# Patient Record
Sex: FEMALE | Race: BLACK OR AFRICAN AMERICAN | NOT HISPANIC OR LATINO | Employment: OTHER | URBAN - METROPOLITAN AREA
[De-identification: names, ages, dates, MRNs, and addresses within clinical notes are randomized per-mention and may not be internally consistent; named-entity substitution may affect disease eponyms.]

---

## 2017-01-03 ENCOUNTER — LAB VISIT (OUTPATIENT)
Dept: LAB | Facility: HOSPITAL | Age: 52
End: 2017-01-03
Attending: INTERNAL MEDICINE
Payer: COMMERCIAL

## 2017-01-03 ENCOUNTER — TELEPHONE (OUTPATIENT)
Dept: VASCULAR SURGERY | Facility: CLINIC | Age: 52
End: 2017-01-03

## 2017-01-03 DIAGNOSIS — N18.6 ESRD (END STAGE RENAL DISEASE): ICD-10-CM

## 2017-01-03 LAB
ALBUMIN SERPL BCP-MCNC: 3 G/DL
ANION GAP SERPL CALC-SCNC: 8 MMOL/L
ANISOCYTOSIS BLD QL SMEAR: SLIGHT
BASOPHILS # BLD AUTO: 0.04 K/UL
BASOPHILS NFR BLD: 0.8 %
BUN SERPL-MCNC: 66 MG/DL
CALCIUM SERPL-MCNC: 8.9 MG/DL
CHLORIDE SERPL-SCNC: 114 MMOL/L
CO2 SERPL-SCNC: 16 MMOL/L
CREAT SERPL-MCNC: 6.9 MG/DL
DIFFERENTIAL METHOD: ABNORMAL
EOSINOPHIL # BLD AUTO: 0.4 K/UL
EOSINOPHIL NFR BLD: 7.9 %
ERYTHROCYTE [DISTWIDTH] IN BLOOD BY AUTOMATED COUNT: 15.4 %
EST. GFR  (AFRICAN AMERICAN): 7.2 ML/MIN/1.73 M^2
EST. GFR  (NON AFRICAN AMERICAN): 6.3 ML/MIN/1.73 M^2
FERRITIN SERPL-MCNC: 1831 NG/ML
GLUCOSE SERPL-MCNC: 90 MG/DL
HCT VFR BLD AUTO: 32.6 %
HGB BLD-MCNC: 10.9 G/DL
IRON SERPL-MCNC: 141 UG/DL
LYMPHOCYTES # BLD AUTO: 2.8 K/UL
LYMPHOCYTES NFR BLD: 52 %
MCH RBC QN AUTO: 27.8 PG
MCHC RBC AUTO-ENTMCNC: 33.4 %
MCV RBC AUTO: 83 FL
MONOCYTES # BLD AUTO: 0.3 K/UL
MONOCYTES NFR BLD: 4.7 %
NEUTROPHILS # BLD AUTO: 1.8 K/UL
NEUTROPHILS NFR BLD: 34.6 %
PHOSPHATE SERPL-MCNC: 3.6 MG/DL
PLATELET # BLD AUTO: 182 K/UL
PLATELET BLD QL SMEAR: ABNORMAL
PMV BLD AUTO: 9 FL
POTASSIUM SERPL-SCNC: 5.6 MMOL/L
RBC # BLD AUTO: 3.92 M/UL
SATURATED IRON: 73 %
SODIUM SERPL-SCNC: 138 MMOL/L
TOTAL IRON BINDING CAPACITY: 192 UG/DL
TRANSFERRIN SERPL-MCNC: 130 MG/DL
WBC # BLD AUTO: 5.29 K/UL

## 2017-01-03 PROCEDURE — 80069 RENAL FUNCTION PANEL: CPT

## 2017-01-03 PROCEDURE — 85025 COMPLETE CBC W/AUTO DIFF WBC: CPT

## 2017-01-03 PROCEDURE — 83540 ASSAY OF IRON: CPT

## 2017-01-03 PROCEDURE — 36415 COLL VENOUS BLD VENIPUNCTURE: CPT | Mod: PO

## 2017-01-03 PROCEDURE — 82728 ASSAY OF FERRITIN: CPT

## 2017-01-03 NOTE — TELEPHONE ENCOUNTER
----- Message from Gonzalez Boone sent at 1/3/2017  1:42 PM CST -----  Contact: Samreen with [facility]  Caller said pt needs appt for port placement. Please call Samreen to schedule at  [phone]

## 2017-01-05 ENCOUNTER — OFFICE VISIT (OUTPATIENT)
Dept: SURGERY | Facility: CLINIC | Age: 52
End: 2017-01-05
Payer: COMMERCIAL

## 2017-01-05 VITALS
SYSTOLIC BLOOD PRESSURE: 179 MMHG | DIASTOLIC BLOOD PRESSURE: 103 MMHG | HEART RATE: 82 BPM | BODY MASS INDEX: 25.16 KG/M2 | HEIGHT: 63 IN | WEIGHT: 142 LBS

## 2017-01-05 DIAGNOSIS — Z85.528 HISTORY OF KIDNEY CANCER: ICD-10-CM

## 2017-01-05 DIAGNOSIS — N18.6 ESRD ON PERITONEAL DIALYSIS: Primary | ICD-10-CM

## 2017-01-05 DIAGNOSIS — Z99.2 ESRD ON PERITONEAL DIALYSIS: Primary | ICD-10-CM

## 2017-01-05 PROCEDURE — 1159F MED LIST DOCD IN RCRD: CPT | Mod: S$GLB,,, | Performed by: SURGERY

## 2017-01-05 PROCEDURE — 99999 PR PBB SHADOW E&M-EST. PATIENT-LVL III: CPT | Mod: PBBFAC,,, | Performed by: SURGERY

## 2017-01-05 PROCEDURE — 99213 OFFICE O/P EST LOW 20 MIN: CPT | Mod: S$GLB,,, | Performed by: SURGERY

## 2017-01-05 NOTE — LETTER
[facility] - General Surgery  [address]  [place] [address]  Phone: [phone] January 5, 2017      Williams Hart MD  [address]  [place] [address]    Patient: Eva Bloom   MR Number: [id number]   YOB: 1965   Date of Visit: 1/5/2017     Dear Dr. Hart:    Thank you for referring Eva Bloom to me for evaluation. Below are the relevant portions of my assessment and plan of care.    Patient presents ESRD with abdominal cancer.     PLAN:  Removal of PD and placement of left neck portacath.    If you have questions, please do not hesitate to call me. I look forward to following Eva along with you.    Sincerely,      Vaughn Baron MD   Section Head - General, Laparoscopic, Bariatric  Acute Care and Oncologic Surgery   - Surgical Weight Loss Program  [facility]    WSR/anamariak    CC  MD Miguel Lockett Jr., MD

## 2017-01-05 NOTE — Clinical Note
January 5, 2017      Williams Hart MD  [address]  [place] [address]           [facility] General Surgery  [address]  [place] [address]  Phone: [phone]          Patient: Eva Bloom   MR Number: [id number]   YOB: 1965   Date of Visit: 1/5/2017       Dear Dr. Williams Hart:    Thank you for referring Eva Bloom to me for evaluation. Attached you will find relevant portions of my assessment and plan of care.    If you have questions, please do not hesitate to call me. I look forward to following Eva Bloom along with you.    Sincerely,    Vaughn Baron MD    Enclosure  CC:  No Recipients    If you would like to receive this communication electronically, please contact [email or web address] or [phone] to request more information on [employer] Link access.    For providers and/or their staff who would like to refer a patient to [facility], please contact us through our one-stop-shop provider referral line, [facility], at [phone].    If you feel you have received this communication in error or would no longer like to receive these types of communications, please e-mail [email or web address]

## 2017-01-05 NOTE — PROGRESS NOTES
History & Physical    SUBJECTIVE:     History of Present Illness:  Patient is a 51 y.o. female presents with esrd on pd and abdominal cancer.  She is s/p pd catheter last year.  She has had quiinton through a right neck port in the past.  She now has recurrent abdominal cancer.       Chief Complaint   Patient presents with    Consult       Review of patient's allergies indicates:   Allergen Reactions    Allopurinol      Other reaction(s): abnormal transaminases       Current Outpatient Prescriptions   Medication Sig Dispense Refill    acetaminophen (TYLENOL) 500 MG tablet Take 500 mg by mouth every 6 (six) hours as needed for Pain.      aluminum-magnesium hydroxide-simethicone (MAALOX) 200-200-20 mg/5 mL Susp Take 30 mLs by mouth every 6 (six) hours as needed.       cabozantinib 60 mg Tab Take 60 mg by mouth once daily. 30 tablet 5    calcitRIOL (ROCALTROL) 0.25 MCG Cap TAKE ONE CAPSULE BY MOUTH ONCE DAILY 30 capsule 11    febuxostat (ULORIC) 40 mg Tab Take 1 tablet (40 mg total) by mouth once daily. 30 tablet 5    lisinopril (PRINIVIL,ZESTRIL) 40 MG tablet TAKE ONE TABLET BY MOUTH ONCE DAILY 30 tablet 11    multivitamin-Ca-iron-minerals (ONE-A-DAY WOMENS FORMULA) 27-0.4 mg Tab Take by mouth. 1 Tablet Oral Every day      nifedipine (PROCARDIA-XL) 60 MG (OSM) 24 hr tablet Take 1 tablet (60 mg total) by mouth once daily. 30 tablet 11    ondansetron (ZOFRAN-ODT) 4 MG TbDL Take 1 tablet (4 mg total) by mouth every 8 (eight) hours as needed. 12 tablet 0    polyethylene glycol (GLYCOLAX) 17 gram PwPk Take 17 g by mouth 2 (two) times daily. (Patient taking differently: Take 17 g by mouth once daily. )  0    psyllium husk-calcium (METAMUCIL PLUS CALCIUM) 1-60 gram-mg Cap Take by mouth. 2 Capsule Oral Every morning       No current facility-administered medications for this visit.        Past Medical History   Diagnosis Date    Anemia     CMV (cytomegalovirus) antibody positive     Essential hypertension  "9/23/2015    H/O herpes simplex type 2 infection     Herpes simplex type 1 antibody positive     History of kidney cancer      s/p left nephrectomy 1/2016    Hyperparathyroidism, unspecified     Hyperuricemia without signs of inflammatory arthritis and tophaceous disease     Kidney stones     LGSIL (low grade squamous intraepithelial dysplasia)     Prediabetes     Proteinuria     Thyroid nodule     Urate nephropathy      Past Surgical History   Procedure Laterality Date    Tonsillectomy      Breast surgery       LUMPECTOMY    Tubal ligation      Colonoscopy N/A 11/12/2015    Nephrectomy-laparoscopic Left 01/12/2016    Peritoneal catheter insertion      Salpingoophorectomy Right 2016     KJB---DAVINCI     Family History   Problem Relation Age of Onset    Hypertension Mother     Diabetes Father     Kidney disease Father     Diabetes Maternal Grandfather     No Known Problems Sister     No Known Problems Brother     Heart disease Sister     No Known Problems Sister     No Known Problems Brother     Breast cancer Neg Hx     Colon cancer Neg Hx     Cancer Neg Hx     Stroke Neg Hx      Social History   Substance Use Topics    Smoking status: Never Smoker    Smokeless tobacco: Never Used    Alcohol use No      Comment: . 2 children. works at [employer].        Review of Systems:  Review of Systems   Constitutional: Negative for fever.   Respiratory: Negative for chest tightness and shortness of breath.    Cardiovascular: Negative for chest pain.   Gastrointestinal: Positive for abdominal pain.   Genitourinary: Positive for difficulty urinating.   Hematological:        Has easy bruising but no easy bleeding       OBJECTIVE:     Vital Signs (Most Recent)  Pulse: 82 (01/05/17 1338)  BP: (!) 179/103 (01/05/17 1338)  5' 3" (1.6 m)  64.4 kg (142 lb)     Physical Exam:  Physical Exam   Constitutional: She appears well-developed and well-nourished.   Cardiovascular: Normal rate, regular rhythm " and normal heart sounds.    Pulmonary/Chest: Effort normal and breath sounds normal.   Abdominal: Soft. Bowel sounds are normal.       Vitals reviewed.      Laboratory  CBC: Reviewed  BMP: Reviewed    Diagnostic Results:  CT: Reviewed    ASSESSMENT/PLAN:     ESRD with abdominal cancer.    PLAN:Plan     Removal of pd and fistula by vascular.

## 2017-01-06 ENCOUNTER — HOSPITAL ENCOUNTER (OUTPATIENT)
Dept: VASCULAR SURGERY | Facility: CLINIC | Age: 52
Discharge: HOME | End: 2017-01-06
Payer: COMMERCIAL

## 2017-01-06 ENCOUNTER — INITIAL CONSULT (OUTPATIENT)
Dept: VASCULAR SURGERY | Facility: CLINIC | Age: 52
End: 2017-01-06
Payer: COMMERCIAL

## 2017-01-06 VITALS
HEIGHT: 64 IN | HEART RATE: 87 BPM | BODY MASS INDEX: 24.07 KG/M2 | SYSTOLIC BLOOD PRESSURE: 167 MMHG | TEMPERATURE: 98 F | DIASTOLIC BLOOD PRESSURE: 97 MMHG | WEIGHT: 141 LBS

## 2017-01-06 DIAGNOSIS — Z99.2 ESRD ON PERITONEAL DIALYSIS: ICD-10-CM

## 2017-01-06 DIAGNOSIS — Z99.2 ESRD ON PERITONEAL DIALYSIS: Primary | ICD-10-CM

## 2017-01-06 DIAGNOSIS — Z01.818 OTHER SPECIFIED PRE-OPERATIVE EXAMINATION: ICD-10-CM

## 2017-01-06 DIAGNOSIS — N18.6 END-STAGE RENAL DISEASE ON PERITONEAL DIALYSIS: Primary | ICD-10-CM

## 2017-01-06 DIAGNOSIS — Z99.2 END-STAGE RENAL DISEASE ON PERITONEAL DIALYSIS: Primary | ICD-10-CM

## 2017-01-06 DIAGNOSIS — N18.6 ESRD ON PERITONEAL DIALYSIS: Primary | ICD-10-CM

## 2017-01-06 DIAGNOSIS — N18.6 ESRD ON PERITONEAL DIALYSIS: ICD-10-CM

## 2017-01-06 PROCEDURE — 99204 OFFICE O/P NEW MOD 45 MIN: CPT | Mod: S$GLB,,, | Performed by: SURGERY

## 2017-01-06 PROCEDURE — 99999 PR PBB SHADOW E&M-EST. PATIENT-LVL III: CPT | Mod: PBBFAC,,, | Performed by: SURGERY

## 2017-01-06 PROCEDURE — 1159F MED LIST DOCD IN RCRD: CPT | Mod: S$GLB,,, | Performed by: SURGERY

## 2017-01-06 PROCEDURE — G0365 VESSEL MAPPING HEMO ACCESS: HCPCS | Mod: S$GLB,,, | Performed by: SURGERY

## 2017-01-06 NOTE — PROGRESS NOTES
REFERRING PHYSICIAN:  Williams Hart M.D.    HISTORY OF PRESENT ILLNESS:  A 51-year-old female with end-stage renal disease   who has been undergoing peritoneal dialysis since February, who now is not using   any peritoneal dialysis or hemodialysis, who is referred for AV access   placement.  She is right-handed.  She has no history of central venous   cannulation other than a right IJ PermCath when she initiated dialysis in early   2016.    PAST MEDICAL HISTORY:  1.  Hypertension.  2.  Hyperparathyroidism.  3.  Prediabetes.    PAST SURGICAL HISTORY:  1.  Tonsillectomy.  2.  Breast lumpectomy.  3.  Laparoscopic nephrectomy.  4.  PD placement.  5.  Oophorectomy.    FAMILY HISTORY:  Positive for diabetes and hypertension.    SOCIAL HISTORY:  Nonsmoker.    MEDICATIONS:  No anticoagulants or antiplatelet agents.  See EPIC for full list.    ALLERGIES:  Allopurinol.    REVIEW OF SYSTEMS:  CARDIOVASCULAR:  Denies postprandial pain or DVT.  All other systems including eyes, ENT, , musculoskeletal, breast, psychiatric,   lymph, allergy and immune are negative.    PHYSICAL EXAMINATION:  VITAL SIGNS:  See nursing note.  GENERAL:  She is in no acute distress.  RESPIRATORY:  Normal effort.  Clear to auscultation.  CARDIOVASCULAR:  Regular rate and rhythm, nondisplaced PMI, no murmur.  VASCULAR:  2+ radial, brachial and femoral pulses.  EXTREMITIES:  Arms do not show ballottable veins.  NEUROLOGIC:  Cranial nerves II-XII intact, 5/5 muscle strength in all   extremities.    IMAGING:  Vein mapping reveals very small cephalic and basilic veins.  The   largest of these is a left cephalic which is 2.3 at the antecubital fossa, but   only 1.8 in the proximal arm.    ASSESSMENT:  Poor candidate for AV fistula due to diminutive vein size.  It is   possible that the left cephalic vein may look better after a block is placed,   but likely she will need an AV graft.    PLAN:  1.  Left upper arm loop AV graft placement, 01/12/2017.  2.   Regional block.    We will interrogate the left cephalic vein with ultrasound immediately prior to   beginning of this procedure, and if the left cephalic vein appeared better than   this preoperative vein mapping then we would proceed with brachiocephalic   fistula instead.    The patient understands the risks and rationale of the procedure and wishes to   Proceed.    ADDENDUM: Dr Baron wishes to remove her PD catheter with same anesthetic.    BEAR  dd: 01/06/2017 12:11:18 (CST)  td: 01/06/2017 12:52:13 (CST)  Doc ID   #[id number]  Job ID #[id number]    CC:

## 2017-01-06 NOTE — MR AVS SNAPSHOT
[facility] Vascular Surgery  [address]  [place] [address]  Phone: [phone]  Fax: [phone]                  Eva Bloom   2017 11:30 AM   Initial consult    Description:  Female : 1965   Provider:  FARIDEH Muñoz III, MD   Department:  [facility] - Vascular Surgery           Reason for Visit     Consult           Diagnoses this Visit        Comments    ESRD on peritoneal dialysis    -  Primary            To Do List           Future Appointments        Provider Department Dept Phone    2017 7:45 AM 3PREP, [address] [facility] [phone]    2017 9:30 AM LAB, HEMONC CANCER BLDG [facility] [phone]    2017 10:30 AM TED [place] - Hematology Oncology [phone]    2017 3:40 PM Williams Hart MD [place]  Nephrology [phone]    2017 4:00 PM Williams Hart MD [facility] [place] [facility]  Nephrology [phone]      Your Future Surgeries/Procedures     2017   Surgery with Figueroa Dsouza MD   [facility] ([facility])    [address] [facility] [id number] [phone]              Goals (5 Years of Data)              9/8/16    10/9/15    HEMOGLOBIN A1C < 7.0   5.1  6.0    Related Problems    Prediabetes      [facility]     [facility] Nurse Care Line -  Assistance  Registered nurses in the [facility] provide clinical advisement, health education, appointment booking, and other advisory services.  Call for this free service at [phone].             Medications           Message regarding Medications     Verify the changes and/or additions to your medication regime listed below are the same as discussed with your clinician today.  If any of these changes or additions are incorrect, please notify your healthcare provider.             Verify that the below list of medications is an accurate representation of the medications  "you are currently taking.  If none reported, the list may be blank. If incorrect, please contact your healthcare provider. Carry this list with you in case of emergency.           Current Medications     acetaminophen (TYLENOL) 500 MG tablet Take 500 mg by mouth every 6 (six) hours as needed for Pain.    aluminum-magnesium hydroxide-simethicone (MAALOX) 200-200-20 mg/5 mL Susp Take 30 mLs by mouth every 6 (six) hours as needed.     cabozantinib 60 mg Tab Take 60 mg by mouth once daily.    calcitRIOL (ROCALTROL) 0.25 MCG Cap TAKE ONE CAPSULE BY MOUTH ONCE DAILY    febuxostat (ULORIC) 40 mg Tab Take 1 tablet (40 mg total) by mouth once daily.    lisinopril (PRINIVIL,ZESTRIL) 40 MG tablet TAKE ONE TABLET BY MOUTH ONCE DAILY    multivitamin-Ca-iron-minerals (ONE-A-DAY WOMENS FORMULA) 27-0.4 mg Tab Take by mouth. 1 Tablet Oral Every day    nifedipine (PROCARDIA-XL) 60 MG (OSM) 24 hr tablet Take 1 tablet (60 mg total) by mouth once daily.    ondansetron (ZOFRAN-ODT) 4 MG TbDL Take 1 tablet (4 mg total) by mouth every 8 (eight) hours as needed.    polyethylene glycol (GLYCOLAX) 17 gram PwPk Take 17 g by mouth 2 (two) times daily.    psyllium husk-calcium (METAMUCIL PLUS CALCIUM) 1-60 gram-mg Cap Take by mouth. 2 Capsule Oral Every morning           Clinical Reference Information           Vital Signs - Last Recorded  Most recent update: 1/6/2017 11:51 AM by Morelia Esteban MA    BP Pulse Temp Ht Wt BMI    (!) 167/97 (BP Location: Right arm, Patient Position: Sitting, BP Method: Automatic) 87 98 °F (36.7 °C) (Oral) 5' 4" (1.626 m) 64 kg (141 lb) 24.2 kg/m2      Blood Pressure          Most Recent Value    Right Arm BP - Sitting  167/97    Left Arm BP - Sitting  179/101    BP  (!)  167/97      Allergies as of 1/6/2017     Allopurinol      Immunizations Administered on Date of Encounter - 1/6/2017     None      Maintenance Dialysis History     Start End Type Comments Center    1/15/2016  Peritoneal  [facility] [place]         "    Current Dialysis Center Information     [facility] [address] [place] Phone #:  [phone]    Contact:  N/A LOUISA [place]ARTE  [id number] Fax #:  [phone]            Transplant Information        Txp Date Organ Coordinator Care Team     Kidney Kinsey Carballo, RN Referring Physician:  Williams Hart MD   Current Nephrologist:  Williams Hart MD

## 2017-01-09 ENCOUNTER — HOSPITAL ENCOUNTER (OUTPATIENT)
Facility: HOSPITAL | Age: 52
Discharge: HOME | End: 2017-01-09
Attending: INTERNAL MEDICINE | Admitting: INTERNAL MEDICINE
Payer: COMMERCIAL

## 2017-01-09 VITALS
WEIGHT: 139 LBS | HEIGHT: 64 IN | TEMPERATURE: 98 F | HEART RATE: 96 BPM | DIASTOLIC BLOOD PRESSURE: 78 MMHG | BODY MASS INDEX: 23.73 KG/M2 | OXYGEN SATURATION: 100 % | RESPIRATION RATE: 18 BRPM | SYSTOLIC BLOOD PRESSURE: 136 MMHG

## 2017-01-09 DIAGNOSIS — N18.5 STAGE 5 CHRONIC KIDNEY DISEASE: ICD-10-CM

## 2017-01-09 DIAGNOSIS — Z99.2 ESRD ON PERITONEAL DIALYSIS: Primary | ICD-10-CM

## 2017-01-09 DIAGNOSIS — N18.6 ESRD ON PERITONEAL DIALYSIS: Primary | ICD-10-CM

## 2017-01-09 PROCEDURE — 76937 US GUIDE VASCULAR ACCESS: CPT | Mod: 26,,, | Performed by: INTERNAL MEDICINE

## 2017-01-09 PROCEDURE — 63600175 PHARM REV CODE 636 W HCPCS

## 2017-01-09 PROCEDURE — 77001 FLUOROGUIDE FOR VEIN DEVICE: CPT | Mod: 26,,, | Performed by: INTERNAL MEDICINE

## 2017-01-09 PROCEDURE — 36558 INSERT TUNNELED CV CATH: CPT | Mod: ,,, | Performed by: INTERNAL MEDICINE

## 2017-01-09 PROCEDURE — 77001 FLUOROGUIDE FOR VEIN DEVICE: CPT

## 2017-01-09 PROCEDURE — 25000003 PHARM REV CODE 250

## 2017-01-09 NOTE — DISCHARGE SUMMARY
[facility]  Discharge Note  Short Stay    Admit Date: 1/9/2017    Discharge Date and Time: 1/9/17    Attending Physician: Figueroa Dsouza MD     Discharge Provider: Flavio Zhao    Diagnoses:  Active Hospital Problems    Diagnosis  POA    ESRD on peritoneal dialysis [N18.6, Z99.2]  Not Applicable     uric acid nephropathy  - followed by nephrology, Dr. Hart  - followed by [facility]        Resolved Hospital Problems    Diagnosis Date Resolved POA   No resolved problems to display.       Discharged Condition: stable    Hospital Course: Patient was admitted for an outpatient procedure and tolerated the procedure well with no complications.    Final Diagnoses: Same as principal problem.    Disposition: Home or Self Care    Follow up/Patient Instructions:  Instruction given. F/u with Dr. Hart.     Medications:  Reconciled Home Medications:   Current Discharge Medication List      CONTINUE these medications which have NOT CHANGED    Details   acetaminophen (TYLENOL) 500 MG tablet Take 500 mg by mouth every 6 (six) hours as needed for Pain.      aluminum-magnesium hydroxide-simethicone (MAALOX) 200-200-20 mg/5 mL Susp Take 30 mLs by mouth every 6 (six) hours as needed.       cabozantinib 60 mg Tab Take 60 mg by mouth once daily.  Qty: 30 tablet, Refills: 5    Associated Diagnoses: Renal cell carcinoma, unspecified laterality      calcitRIOL (ROCALTROL) 0.25 MCG Cap TAKE ONE CAPSULE BY MOUTH ONCE DAILY  Qty: 30 capsule, Refills: 11    Associated Diagnoses: Hyperparathyroidism due to renal insufficiency      febuxostat (ULORIC) 40 mg Tab Take 1 tablet (40 mg total) by mouth once daily.  Qty: 30 tablet, Refills: 5      lisinopril (PRINIVIL,ZESTRIL) 40 MG tablet TAKE ONE TABLET BY MOUTH ONCE DAILY  Qty: 30 tablet, Refills: 11    Associated Diagnoses: Hypertensive kidney disease with chronic kidney disease, stage 1-4 or unspecified chronic kidney disease      multivitamin-Ca-iron-minerals (ONE-A-DAY  WOMENS FORMULA) 27-0.4 mg Tab Take by mouth. 1 Tablet Oral Every day      nifedipine (PROCARDIA-XL) 60 MG (OSM) 24 hr tablet Take 1 tablet (60 mg total) by mouth once daily.  Qty: 30 tablet, Refills: 11      ondansetron (ZOFRAN-ODT) 4 MG TbDL Take 1 tablet (4 mg total) by mouth every 8 (eight) hours as needed.  Qty: 12 tablet, Refills: 0      polyethylene glycol (GLYCOLAX) 17 gram PwPk Take 17 g by mouth 2 (two) times daily.  Refills: 0      psyllium husk-calcium (METAMUCIL PLUS CALCIUM) 1-60 gram-mg Cap Take by mouth. 2 Capsule Oral Every morning           No discharge procedures on file.      Discharge Procedure Orders: Resume previous diet and activity.

## 2017-01-09 NOTE — IP AVS SNAPSHOT
[facility]  [address]  [facility] [address]  Phone: [phone]           Patient Discharge Instructions     Our goal is to set you up for success. This packet includes information on your condition, medications, and your home care. It will help you to care for yourself so you don't get sicker and need to go back to the hospital.     Please ask your nurse if you have any questions.        There are many details to remember when preparing to leave the hospital. Here is what you will need to do:    1. Take your medicine. If you are prescribed medications, review your Medication List in the following pages. You may have new medications to  at the pharmacy and others that you'll need to stop taking. Review the instructions for how and when to take your medications. Talk with your doctor or nurses if you are unsure of what to do.     2. Go to your follow-up appointments. Specific follow-up information is listed in the following pages. Your may be contacted by a transition nurse or clinical provider about future appointments. Be sure we have all of the phone numbers to reach you, if needed. Please contact your provider's office if you are unable to make an appointment.     3. Watch for warning signs. Your doctor or nurse will give you detailed warning signs to watch for and when to call for assistance. These instructions may also include educational information about your condition. If you experience any of warning signs to your health, call your doctor.               [facility]  Unless otherwise directed by your provider, please contact [facility], our nurse care line that is available for 24/7 assistance.     [phone] (toll-free)    Registered nurses in the [facility] provide clinical advisement, health education, appointment booking, and other advisory services.                    ** Verify the list of medication(s) below is accurate and up  to date. Carry this with you in case of emergency. If your medications have changed, please notify your healthcare provider.             Medication List      ASK your doctor about these medications        Additional Info                      acetaminophen 500 MG tablet   Commonly known as:  TYLENOL   Refills:  0   Dose:  500 mg    Instructions:  Take 500 mg by mouth every 6 (six) hours as needed for Pain.     Begin Date    AM    Noon    PM    Bedtime       aluminum-magnesium hydroxide-simethicone 200-200-20 mg/5 mL Susp   Commonly known as:  MAALOX   Refills:  0   Dose:  30 mL   Indications:  prn    Instructions:  Take 30 mLs by mouth every 6 (six) hours as needed.     Begin Date    AM    Noon    PM    Bedtime       cabozantinib 60 mg Tab   Quantity:  30 tablet   Refills:  5   Dose:  60 mg    Instructions:  Take 60 mg by mouth once daily.     Begin Date    AM    Noon    PM    Bedtime       calcitRIOL 0.25 MCG Cap   Commonly known as:  ROCALTROL   Quantity:  30 capsule   Refills:  11    Instructions:  TAKE ONE CAPSULE BY MOUTH ONCE DAILY     Begin Date    AM    Noon    PM    Bedtime       febuxostat 40 mg Tab   Commonly known as:  ULORIC   Quantity:  30 tablet   Refills:  5   Dose:  40 mg    Instructions:  Take 1 tablet (40 mg total) by mouth once daily.     Begin Date    AM    Noon    PM    Bedtime       lisinopril 40 MG tablet   Commonly known as:  PRINIVIL,ZESTRIL   Quantity:  30 tablet   Refills:  11    Instructions:  TAKE ONE TABLET BY MOUTH ONCE DAILY     Begin Date    AM    Noon    PM    Bedtime       METAMUCIL PLUS CALCIUM 1-60 gram-mg Cap   Refills:  0   Generic drug:  psyllium husk-calcium    Instructions:  Take by mouth. 2 Capsule Oral Every morning     Begin Date    AM    Noon    PM    Bedtime       nifedipine 60 MG (OSM) 24 hr tablet   Commonly known as:  PROCARDIA-XL   Quantity:  30 tablet   Refills:  11   Dose:  60 mg    Instructions:  Take 1 tablet (60 mg total) by mouth once daily.     Begin Date     AM    Noon    PM    Bedtime       ondansetron 4 MG Tbdl   Commonly known as:  ZOFRAN-ODT   Quantity:  12 tablet   Refills:  0   Dose:  4 mg    Instructions:  Take 1 tablet (4 mg total) by mouth every 8 (eight) hours as needed.     Begin Date    AM    Noon    PM    Bedtime       ONE-A-DAY WOMENS FORMULA 27-0.4 mg Tab   Refills:  0   Generic drug:  multivitamin-Ca-iron-minerals    Instructions:  Take by mouth. 1 Tablet Oral Every day     Begin Date    AM    Noon    PM    Bedtime       polyethylene glycol 17 gram Pwpk   Commonly known as:  GLYCOLAX   Refills:  0   Dose:  17 g    Instructions:  Take 17 g by mouth 2 (two) times daily.     Begin Date    AM    Noon    PM    Bedtime                  Please bring to all follow up appointments:    1. A copy of your discharge instructions.  2. All medicines you are currently taking in their original bottles.  3. Identification and insurance card.    Please arrive 15 minutes ahead of scheduled appointment time.    Please call 24 hours in advance if you must reschedule your appointment and/or time.        Your Scheduled Appointments     Jan 11, 2017  9:30 AM CST   Non-Fasting Lab with LAB, HEMONC CANCER BLDG [facility] ([facility])    [address]  [place] [address]   [phone]            Jan 11, 2017 10:30 AM CST   Established Patient Visit with Liset Ngo NP   [place] [facility] ([facility])    [address]  [place] [address]   [phone]            Jan 18, 2017  3:40 PM CST   Established Patient Visit with Williams Hart MD   [facility] ([place])    [address] [facility] [place] [address]   [phone]            Feb 22, 2017  4:00 PM CST   Established Patient Visit with Williams Hart MD   [facility] ([place])    [address] [facility] [place] [address]   [phone]            Mar 29, 2017  4:00 PM CDT   Established Patient Visit with Williams Hart MD  "  [facility] ([place])    [address]  [place] [address] [phone]              Your Future Surgeries/Procedures     Jan 12, 2017   Surgery with FARIDEH Muñoz III, MD   [facility] ([facility])    [address] [facility] [address] [phone]                  Admission Information     Date & Time Provider Department CSN    1/9/2017  7:16 AM Figueroa Dsouza MD [facility] [id number]      Care Providers     Provider Role Specialty Primary office phone    Figueroa Dsouza MD Attending Provider Nephrology [phone]      Your Vitals Were     BP Pulse Temp Resp Height Weight    148/80 (BP Location: Right arm, Patient Position: Sitting, BP Method: Automatic) 94 97.7 °F (36.5 °C) (Oral) 18 5' 4" (1.626 m) 63 kg (139 lb)    SpO2 BMI             100% 23.86 kg/m2         Recent Lab Values        10/9/2015 9/8/2016                       11:25 AM  8:10 AM          A1C 6.0 5.1          Comment for A1C at  8:10 AM on 9/8/2016:  According to ADA guidelines, hemoglobin A1C <7.0% represents  optimal control in non-pregnant diabetic patients.  Different  metrics may apply to specific populations.   Standards of Medical Care in Diabetes - 2016.  For the purpose of screening for the presence of diabetes:  <5.7%     Consistent with the absence of diabetes  5.7-6.4%  Consistent with increasing risk for diabetes   (prediabetes)  >or=6.5%  Consistent with diabetes  Currently no consensus exists for use of hemoglobin A1C  for diagnosis of diabetes for children.        Allergies as of 1/9/2017        Reactions    Allopurinol     Other reaction(s): abnormal transaminases      Advance Directives     An advance directive is a document which, in the event you are no longer able to make decisions for yourself, tells your healthcare team what kind of treatment you do or do not want to receive, or who you would like to make those decisions for you.  " If you do not currently have an advance directive, [facility] encourages you to create one.  For more information call:  [phone]), [phone] ([phone]),  or log on to [email or web address].        Language Assistance Services     ATTENTION: Language assistance services are available, free of charge. Please call [phone].      ATENCIÓN: Si habla español, tiene a bonilla disposición servicios gratuitos de asistencia lingüística. Llame al [phone].     CHÚ Ý: N?u b?n nói Ti?ng Vi?t, có các d?ch v? h? tr? ngôn ng? mi?n phí dành cho b?n. G?i s? [phone].        Chronic Kindey Disease Education              [facility] complies with applicable Federal civil rights laws and does not discriminate on the basis of race, color, national origin, age, disability, or sex.

## 2017-01-09 NOTE — PLAN OF CARE
Problem: Patient Care Overview  Goal: Plan of Care Review  Outcome: Outcome(s) achieved Date Met:  01/09/17  Patient discharged per MD orders. Instructions given on medications, wound care, activity, signs of infection, when to call MD, and follow up appointments. Pt verbalized understanding. Patient AAOx3, VSS, no c/o pain or discomfort at this time. Central line dressing c/d/i to right chest wall. No active bleeding. No hematoma noted. Ports are clamped. Telemetry and PIV removed. Patient and family refused transport, ambulated off unit.

## 2017-01-09 NOTE — PROGRESS NOTES
[facility] Procedure Note:  A right IJ Bard, Glidepath 14.5 Fr. x 19cm tunneled catheter was inserted by Dr. Zhao.  Lot # [id number]. The catheter was inserted and verified under flouroscopy.  OK to use the catheter per Dr. Zhao. Heparin 1000 units/ml was instilled to fill each catheter lumen.  Arterial lumen = 1.6 ml; Venous lumen = 1.6 ml; Total Heparin = 3200 untis.

## 2017-01-09 NOTE — IP AVS SNAPSHOT
[address]  [place] [address]  Phone: [phone]           I have received a copy of my After Visit Summary and discharge instructions from [facility].    INSTRUCTIONS RECEIVED AND UNDERSTOOD BY:                     Patient/Patient Representative: ________________________________________________________________     Date/Time: ________________________________________________________________                     Instructions Given By: ________________________________________________________________     Date/Time: ________________________________________________________________

## 2017-01-09 NOTE — PROCEDURES
Procedure Date: 1/9/17    (s) and Role:   Flavio Zhao MD    Pre-op Diagnosis:    ESRD  PD failure    Post-op Diagnosis;  ESRD  PD failure    Procedure: Insertion Tunneled HD Catheter with Fluoro     Anesthesia: IV Sedation + Local     Findings/Key Components:   Catheter placed in RA. Good flush and draw through each port.  Estimated Blood Loss: 5mL     Specimens     None         PROCEDURE: After obtaining consent, the patient was taken to the [facility] suite. Sedation was administered. The neck and chest were prepped and draped in usual sterile fashion. We began using ultrasound guidance to examine the right internal jugular vein. It appeared to be patent and was free of thrombus that appeared suitable for access for HD catheter. We accessed the internal jugular vein using a Seldinger technique with an micropunture needle without difficulty, then the thin wire was passed into the superior vena cava through the heart into the inferior vena cava. The wire position was confirmed with intraoperative fluoroscopy, then a 5Fr sheath was introduced over the wire. The wire was removed and the the guidewire was passed into the IVC under fluoroscopic guidance. Counterincision was made at the venotomy and on the chest wall just below the clavicle. Local anesthesia was used to anesthetize the track and a tunneler was used to pass the 19cm GlidePath catheter underneath the chest wall until the felt cuff was just underneath the counter incision. The 5fr sheath was removed and the vein was dilated using serial dilators. Then the large peel-away sheath was then inserted over the guidewire under fluoroscopic guidance. The dilator and wires were removed. The catheter was placed through the peel-away sheath and positioned appropriately at center of RA. Fluoroscopy was used to confirm that the catheter tip was in appropriate position and that there was no kinking at the apex of the catheter. A permanent recording of the  catheter position was also taken with fluoroscopy. Both lumens had excellent draw and flush. The catheter was then secured in place with 3-0 Prolene. The counterincision in the neck was closed with a 3-0 Vicryl. There were no immediate complications. She was discharged in stable condition. Tolerated well.

## 2017-01-09 NOTE — PLAN OF CARE
Problem: Patient Care Overview  Goal: Plan of Care Review  Outcome: Ongoing (interventions implemented as appropriate)  Received report from FELIPE Momin. Patient s/p permacath placement, AAOx3. VSS, no c/o pain or discomfort at this time, resp even and unlabored.  Right chest wall permacath clamped withgauze/tegaderm dressing is c/d/i.   No active bleeding. No hematoma noted. Post procedure protocol reviewed with patient. Understanding verbalized.  Nurse call bell within reach. Will continue to monitor per post procedure protocol.

## 2017-01-09 NOTE — PLAN OF CARE
Problem: Patient Care Overview  Goal: Plan of Care Review  Outcome: Ongoing (interventions implemented as appropriate)  Patient arrived to room. PIV placed. Admit assessment completed. Plan of care discussed with patient. Will monitor

## 2017-01-09 NOTE — H&P
Nephrology H&P      Consult Requested By: Figueroa Dsouza MD  Reason for Consult: permacath placement     SUBJECTIVE:     History of Present Illness:  Patient is a 51 y.o. female presents for permacath placement. Patient is ESRD on PD, now with PD failure. Patient is doing well and has no complaints.     PTA Medications   Medication Sig    acetaminophen (TYLENOL) 500 MG tablet Take 500 mg by mouth every 6 (six) hours as needed for Pain.    aluminum-magnesium hydroxide-simethicone (MAALOX) 200-200-20 mg/5 mL Susp Take 30 mLs by mouth every 6 (six) hours as needed.     cabozantinib 60 mg Tab Take 60 mg by mouth once daily.    calcitRIOL (ROCALTROL) 0.25 MCG Cap TAKE ONE CAPSULE BY MOUTH ONCE DAILY    febuxostat (ULORIC) 40 mg Tab Take 1 tablet (40 mg total) by mouth once daily.    lisinopril (PRINIVIL,ZESTRIL) 40 MG tablet TAKE ONE TABLET BY MOUTH ONCE DAILY    multivitamin-Ca-iron-minerals (ONE-A-DAY WOMENS FORMULA) 27-0.4 mg Tab Take by mouth. 1 Tablet Oral Every day    nifedipine (PROCARDIA-XL) 60 MG (OSM) 24 hr tablet Take 1 tablet (60 mg total) by mouth once daily.    ondansetron (ZOFRAN-ODT) 4 MG TbDL Take 1 tablet (4 mg total) by mouth every 8 (eight) hours as needed.    polyethylene glycol (GLYCOLAX) 17 gram PwPk Take 17 g by mouth 2 (two) times daily. (Patient taking differently: Take 17 g by mouth once daily. )    psyllium husk-calcium (METAMUCIL PLUS CALCIUM) 1-60 gram-mg Cap Take by mouth. 2 Capsule Oral Every morning       Review of patient's allergies indicates:   Allergen Reactions    Allopurinol      Other reaction(s): abnormal transaminases        Past Medical History   Diagnosis Date    Anemia     Cancer     CMV (cytomegalovirus) antibody positive     Essential hypertension 9/23/2015    H/O herpes simplex type 2 infection     Herpes simplex type 1 antibody positive     History of kidney cancer      s/p left nephrectomy 1/2016    Hyperparathyroidism, unspecified      Hyperuricemia without signs of inflammatory arthritis and tophaceous disease     Kidney stones     LGSIL (low grade squamous intraepithelial dysplasia)     Prediabetes     Proteinuria     Thyroid nodule     Urate nephropathy      Past Surgical History   Procedure Laterality Date    Tonsillectomy      Breast surgery       LUMPECTOMY    Tubal ligation      Colonoscopy N/A 11/12/2015    Nephrectomy-laparoscopic Left 01/12/2016    Peritoneal catheter insertion      Salpingoophorectomy Right 2016     KJB---DAVINCI     Family History   Problem Relation Age of Onset    Hypertension Mother     Diabetes Father     Kidney disease Father     Diabetes Maternal Grandfather     No Known Problems Sister     No Known Problems Brother     Heart disease Sister     No Known Problems Sister     No Known Problems Brother     Breast cancer Neg Hx     Colon cancer Neg Hx     Cancer Neg Hx     Stroke Neg Hx      Social History   Substance Use Topics    Smoking status: Never Smoker    Smokeless tobacco: Never Used    Alcohol use No      Comment: . 2 children. works at [employer].       Review of Systems:  Constitutional: Negative for fatigue.   Eyes: Negative for discharge.   Respiratory: Negative for cough, shortness of breath and wheezing.   Cardiovascular: Negative for chest pain and palpitations.   Gastrointestinal: Negative for nausea, vomiting, abdominal pain and diarrhea.   Genitourinary: Negative for dysuria, urgency, frequency and hematuria.   Skin: Negative for color change and rash.   Psychiatric/Behavioral: Negative for confusion.      OBJECTIVE:     Vital Signs (Most Recent)  Temp: 97.5 °F (36.4 °C) (01/09/17 0739)  Pulse: 98 (01/09/17 0739)  Resp: 18 (01/09/17 0739)  BP: (!) 169/102 (01/09/17 0740)  SpO2: 100 % (01/09/17 0739)       No intake or output data in the 24 hours ending 01/09/17 0841    Physical Exam:  Gen: AAOx3, NAD  HEENT: mmm  Neck: no bruit, no JVD  CV: RRR, no m/r  Resp: CTAx2,  normal effort  GI: soft, ND, NTTP, +BS, PD cath in place/dressing  Extr: no LE edema  Neuro: normal reflexes, no focal deficits      Laboratory:  CBC with Diff:     Recent Labs  Lab 01/03/17 0912   WBC 5.29   HGB 10.9*   HCT 32.6*      LYMPH 52.0*  2.8   MONO 4.7  0.3   EOSINOPHIL 7.9     COAG:  No results for input(s): APTT, INR, PTT in the last 168 hours.    CMP:   Recent Labs  Lab 01/03/17 0912   GLU 90   CALCIUM 8.9   ALBUMIN 3.0*      K 5.6*   CO2 16*   *   BUN 66*   CREATININE 6.9*   PHOS 3.6         Lab Results   Component Value Date    LABPROT 9.7 09/23/2015     Lab Results   Component Value Date    CALCIUM 8.9 01/03/2017    PHOS 3.6 01/03/2017     Lab Results   Component Value Date    IRON 141 01/03/2017    TIBC 192 (L) 01/03/2017    FERRITIN 1831 (H) 01/03/2017         Diagnostic Results:  Labs: Reviewed      Scheduled Meds:  Continuous Infusions:          ASSESSMENT/PLAN:     Patient Active Problem List   Diagnosis    ESRD on peritoneal dialysis    Kidney stones    Hyperparathyroidism, secondary renal    Hyperuricemia without signs of inflammatory arthritis and tophaceous disease    Proteinuria    Urate nephropathy    Essential hypertension    Pap smear abnormality of cervix with ASCUS favoring benign    CMV (cytomegalovirus) antibody positive    Herpes simplex type 1 antibody positive    H/O herpes simplex type 2 infection    Overweight (BMI 25.0-29.9)    Prediabetes    Multinodular goiter    History of kidney cancer    Patient on waiting list for kidney transplant    S/p nephrectomy left    History of abnormal cervical Pap smear    Other specified pre-operative examination       Plan:     Will place tunneled HD catheter for hemodialysis initiation.

## 2017-01-11 ENCOUNTER — LAB VISIT (OUTPATIENT)
Dept: LAB | Facility: HOSPITAL | Age: 52
End: 2017-01-11
Attending: INTERNAL MEDICINE
Payer: COMMERCIAL

## 2017-01-11 ENCOUNTER — TELEPHONE (OUTPATIENT)
Dept: VASCULAR SURGERY | Facility: CLINIC | Age: 52
End: 2017-01-11

## 2017-01-11 ENCOUNTER — TELEPHONE (OUTPATIENT)
Dept: SURGERY | Facility: CLINIC | Age: 52
End: 2017-01-11

## 2017-01-11 ENCOUNTER — OFFICE VISIT (OUTPATIENT)
Dept: HEMATOLOGY/ONCOLOGY | Facility: CLINIC | Age: 52
End: 2017-01-11
Payer: COMMERCIAL

## 2017-01-11 VITALS
TEMPERATURE: 100 F | BODY MASS INDEX: 24.41 KG/M2 | HEART RATE: 83 BPM | SYSTOLIC BLOOD PRESSURE: 176 MMHG | WEIGHT: 142.19 LBS | DIASTOLIC BLOOD PRESSURE: 98 MMHG | RESPIRATION RATE: 16 BRPM

## 2017-01-11 DIAGNOSIS — T45.1X5A CHEMOTHERAPY-INDUCED THROMBOCYTOPENIA: ICD-10-CM

## 2017-01-11 DIAGNOSIS — C64.2 MALIGNANT NEOPLASM OF LEFT KIDNEY: ICD-10-CM

## 2017-01-11 DIAGNOSIS — Z51.11 ENCOUNTER FOR CHEMOTHERAPY MANAGEMENT: ICD-10-CM

## 2017-01-11 DIAGNOSIS — T45.1X5A CHEMOTHERAPY INDUCED NAUSEA AND VOMITING: ICD-10-CM

## 2017-01-11 DIAGNOSIS — N18.6 ANEMIA IN ESRD (END-STAGE RENAL DISEASE): ICD-10-CM

## 2017-01-11 DIAGNOSIS — D69.59 CHEMOTHERAPY-INDUCED THROMBOCYTOPENIA: ICD-10-CM

## 2017-01-11 DIAGNOSIS — C78.6 MALIGNANT NEOPLASM METASTATIC TO PERITONEUM: ICD-10-CM

## 2017-01-11 DIAGNOSIS — N18.6 ESRD ON HEMODIALYSIS: ICD-10-CM

## 2017-01-11 DIAGNOSIS — E04.1 THYROID NODULE: ICD-10-CM

## 2017-01-11 DIAGNOSIS — R11.2 CHEMOTHERAPY INDUCED NAUSEA AND VOMITING: ICD-10-CM

## 2017-01-11 DIAGNOSIS — Z90.721 HISTORY OF RIGHT OOPHORECTOMY: ICD-10-CM

## 2017-01-11 DIAGNOSIS — N18.9 ACUTE ON CHRONIC KIDNEY FAILURE: ICD-10-CM

## 2017-01-11 DIAGNOSIS — N18.4 CKD (CHRONIC KIDNEY DISEASE) STAGE 4, GFR 15-29 ML/MIN: ICD-10-CM

## 2017-01-11 DIAGNOSIS — N17.9 ACUTE ON CHRONIC KIDNEY FAILURE: ICD-10-CM

## 2017-01-11 DIAGNOSIS — G89.3 CANCER ASSOCIATED PAIN: ICD-10-CM

## 2017-01-11 DIAGNOSIS — Z99.2 ESRD ON HEMODIALYSIS: ICD-10-CM

## 2017-01-11 DIAGNOSIS — Z90.5 S/P NEPHRECTOMY: Chronic | ICD-10-CM

## 2017-01-11 DIAGNOSIS — D63.1 ANEMIA IN ESRD (END-STAGE RENAL DISEASE): ICD-10-CM

## 2017-01-11 DIAGNOSIS — C64.2 METASTATIC RENAL CELL CARCINOMA, LEFT: Primary | ICD-10-CM

## 2017-01-11 LAB
ALBUMIN SERPL BCP-MCNC: 2.7 G/DL
ALP SERPL-CCNC: 123 U/L
ALT SERPL W/O P-5'-P-CCNC: 50 U/L
ANION GAP SERPL CALC-SCNC: 7 MMOL/L
AST SERPL-CCNC: 43 U/L
BASOPHILS # BLD AUTO: 0.05 K/UL
BASOPHILS NFR BLD: 0.8 %
BILIRUB SERPL-MCNC: 0.7 MG/DL
BUN SERPL-MCNC: 48 MG/DL
CALCIUM SERPL-MCNC: 8.2 MG/DL
CHLORIDE SERPL-SCNC: 103 MMOL/L
CO2 SERPL-SCNC: 29 MMOL/L
CREAT SERPL-MCNC: 7.1 MG/DL
DIFFERENTIAL METHOD: ABNORMAL
EOSINOPHIL # BLD AUTO: 0.4 K/UL
EOSINOPHIL NFR BLD: 6.9 %
ERYTHROCYTE [DISTWIDTH] IN BLOOD BY AUTOMATED COUNT: 15.7 %
EST. GFR  (AFRICAN AMERICAN): 7 ML/MIN/1.73 M^2
EST. GFR  (NON AFRICAN AMERICAN): 6.1 ML/MIN/1.73 M^2
GLUCOSE SERPL-MCNC: 91 MG/DL
HCT VFR BLD AUTO: 30 %
HGB BLD-MCNC: 10.2 G/DL
LYMPHOCYTES # BLD AUTO: 2.1 K/UL
LYMPHOCYTES NFR BLD: 33.4 %
MCH RBC QN AUTO: 27.9 PG
MCHC RBC AUTO-ENTMCNC: 34 %
MCV RBC AUTO: 82 FL
MONOCYTES # BLD AUTO: 0.3 K/UL
MONOCYTES NFR BLD: 5.2 %
NEUTROPHILS # BLD AUTO: 3.3 K/UL
NEUTROPHILS NFR BLD: 53.7 %
PLATELET # BLD AUTO: 67 K/UL
PMV BLD AUTO: 9.2 FL
POTASSIUM SERPL-SCNC: 4.4 MMOL/L
PROT SERPL-MCNC: 7.3 G/DL
RBC # BLD AUTO: 3.66 M/UL
SODIUM SERPL-SCNC: 139 MMOL/L
WBC # BLD AUTO: 6.13 K/UL

## 2017-01-11 PROCEDURE — 36415 COLL VENOUS BLD VENIPUNCTURE: CPT

## 2017-01-11 PROCEDURE — 99214 OFFICE O/P EST MOD 30 MIN: CPT | Mod: S$GLB,,, | Performed by: NURSE PRACTITIONER

## 2017-01-11 PROCEDURE — 80053 COMPREHEN METABOLIC PANEL: CPT

## 2017-01-11 PROCEDURE — 85025 COMPLETE CBC W/AUTO DIFF WBC: CPT

## 2017-01-11 PROCEDURE — 99999 PR PBB SHADOW E&M-EST. PATIENT-LVL IV: CPT | Mod: PBBFAC,,, | Performed by: NURSE PRACTITIONER

## 2017-01-11 PROCEDURE — 1159F MED LIST DOCD IN RCRD: CPT | Mod: S$GLB,,, | Performed by: NURSE PRACTITIONER

## 2017-01-11 RX ORDER — TRAMADOL HYDROCHLORIDE 50 MG/1
25 TABLET ORAL EVERY 12 HOURS PRN
Qty: 30 TABLET | Refills: 0 | Status: SHIPPED | OUTPATIENT
Start: 2017-01-11 | End: 2017-01-21

## 2017-01-11 RX ORDER — ONDANSETRON 8 MG/1
8 TABLET, ORALLY DISINTEGRATING ORAL EVERY 12 HOURS PRN
Qty: 30 TABLET | Refills: 1 | Status: SHIPPED | OUTPATIENT
Start: 2017-01-11 | End: 2017-01-27 | Stop reason: SDUPTHER

## 2017-01-11 NOTE — Clinical Note
Schedule CT chest and MRI abdomen ASAP. Then schedule pt to come back with labs (CBC,CMP) and to see Dr. Rivera to review imaging.

## 2017-01-11 NOTE — PROGRESS NOTES
"Subjective:       Patient ID: Eva Bloom is a 51 y.o. female.    Chief Complaint: renal cell carcinoma; lower abdominl pain; and Urinary Retention (has difficulty starting her stream when she goes to void)    HPI   51 year old female, pt of Dr. Shahid, to clinic for 4 week follow up and to review labs for recurrent metastatic renal cell carcinoma. At her last clinic visit, the patient was started on cabozantinib.  Pt reports compliance with the medication, which she started around December 17. Since starting medication, pt reports an increase in fatigue, intermittent nausea, decreased appetite and altered taste sensation. Additionally, pt has began was increase in lower abdominal pain and difficulty with starting urination.She is scheduled for removal of her PD catheter tomorrow and is having an AV fistula placed.    Today, she denies any mouth sores, nausea, vomiting, diarrhea, constipation,weight loss, chest pain, shortness of breath, leg swelling, headache, dizziness, or mood changes.    She is accompanied alone to clinic.    Oncologic History (From Chart and Patient):  Eva Bloom is a 51 y.o. female with ESRD on HD who was found to have two L renal masses. She underwent L lap nephrectomy on 1/12/16. 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 Margins were negative.  Shehealed from surgery well, but then developed a large ovarian mass.  This was removed by gyn along with multiple sites of metastatic disease in the pelvis.  Path was c/w recurrence of RCC.     11/20/16 Pelvic ultrasound reveals "Large heterogeneous cystic and solid mass which appears to arise from the right ovary with worrisome imaging features for malignancy.  Differential diagnosis includes malignant tumors such as serous or mucinous cystadenocarcinoma.  It is important to note that the patient is at risk for " "ovarian torsion due to the size of the mass.Multiple uterine fibroids are identified with the largest in the uterine fundus."    11/22/16 pathology reveals "FINAL PATHOLOGIC DIAGNOSIS-RIGHT OVARY AND FALLOPIAN TUBE, RIGHT SALPINGO-OOPHORECTOMY:  -Positive for malignancy, high grade carcinoma morphologically and immunohistochemically consistent with metastasis from the patient's known renal cell carcinoma"    Review of Systems   Constitutional: Positive for appetite change and fatigue. Negative for activity change, chills and fever.   HENT: Negative for congestion, hearing loss, mouth sores, sore throat, tinnitus and voice change.    Eyes: Negative for pain and visual disturbance.   Respiratory: Negative for cough, shortness of breath and wheezing.    Cardiovascular: Negative for chest pain, palpitations and leg swelling.   Gastrointestinal: Positive for abdominal pain. Negative for constipation, diarrhea, nausea and vomiting.   Endocrine: Negative for cold intolerance and heat intolerance.   Genitourinary: Positive for difficulty urinating. Negative for dyspareunia, dysuria, frequency, menstrual problem, urgency, vaginal bleeding, vaginal discharge and vaginal pain.   Musculoskeletal: Negative for arthralgias and myalgias.   Skin: Negative for color change, rash and wound.   Allergic/Immunologic: Negative for environmental allergies and food allergies.   Neurological: Negative for weakness, numbness and headaches.   Hematological: Negative for adenopathy. Does not bruise/bleed easily.   Psychiatric/Behavioral: Negative for agitation, confusion, hallucinations and sleep disturbance. The patient is not nervous/anxious.    All other systems reviewed and are negative.      Allergies:  Review of patient's allergies indicates:   Allergen Reactions    Allopurinol      Other reaction(s): abnormal transaminases       Medications:  Current Outpatient Prescriptions   Medication Sig Dispense Refill    acetaminophen (TYLENOL) " 500 MG tablet Take 500 mg by mouth every 6 (six) hours as needed for Pain.      aluminum-magnesium hydroxide-simethicone (MAALOX) 200-200-20 mg/5 mL Susp Take 30 mLs by mouth every 6 (six) hours as needed.       cabozantinib 60 mg Tab Take 60 mg by mouth once daily. 30 tablet 5    calcitRIOL (ROCALTROL) 0.25 MCG Cap TAKE ONE CAPSULE BY MOUTH ONCE DAILY 30 capsule 11    febuxostat (ULORIC) 40 mg Tab Take 1 tablet (40 mg total) by mouth once daily. 30 tablet 5    lisinopril (PRINIVIL,ZESTRIL) 40 MG tablet TAKE ONE TABLET BY MOUTH ONCE DAILY 30 tablet 11    multivitamin-Ca-iron-minerals (ONE-A-DAY WOMENS FORMULA) 27-0.4 mg Tab Take by mouth. 1 Tablet Oral Every day      nifedipine (PROCARDIA-XL) 60 MG (OSM) 24 hr tablet Take 1 tablet (60 mg total) by mouth once daily. 30 tablet 11    ondansetron (ZOFRAN-ODT) 4 MG TbDL Take 1 tablet (4 mg total) by mouth every 8 (eight) hours as needed. 12 tablet 0    polyethylene glycol (GLYCOLAX) 17 gram PwPk Take 17 g by mouth 2 (two) times daily. (Patient taking differently: Take 17 g by mouth once daily. )  0    psyllium husk-calcium (METAMUCIL PLUS CALCIUM) 1-60 gram-mg Cap Take by mouth. 2 Capsule Oral Every morning       No current facility-administered medications for this visit.        PMH:  Past Medical History   Diagnosis Date    Anemia     Cancer     CMV (cytomegalovirus) antibody positive     Essential hypertension 9/23/2015    H/O herpes simplex type 2 infection     Herpes simplex type 1 antibody positive     History of kidney cancer      s/p left nephrectomy 1/2016    Hyperparathyroidism, unspecified     Hyperuricemia without signs of inflammatory arthritis and tophaceous disease     Kidney stones     LGSIL (low grade squamous intraepithelial dysplasia)     Prediabetes     Proteinuria     Thyroid nodule     Urate nephropathy        PSH:  Past Surgical History   Procedure Laterality Date    Tonsillectomy      Breast surgery       LUMPECTOMY     Tubal ligation      Colonoscopy N/A 11/12/2015    Nephrectomy-laparoscopic Left 01/12/2016    Peritoneal catheter insertion      Salpingoophorectomy Right 2016     KJB---GENE CamachoHx:  Family History   Problem Relation Age of Onset    Hypertension Mother     Diabetes Father     Kidney disease Father     Diabetes Maternal Grandfather     No Known Problems Sister     No Known Problems Brother     Heart disease Sister     No Known Problems Sister     No Known Problems Brother     Breast cancer Neg Hx     Colon cancer Neg Hx     Cancer Neg Hx     Stroke Neg Hx        SocHx:  Social History     Social History    Marital status:      Spouse name: N/A    Number of children: 2    Years of education: N/A     Occupational History    [email or web address]     Social History Main Topics    Smoking status: Never Smoker    Smokeless tobacco: Never Used    Alcohol use No      Comment: . 2 children. works at [employer].    Drug use: No    Sexual activity: Yes     Partners: Male     Other Topics Concern    Not on file     Social History Narrative         Objective:      Physical Exam   Constitutional: She is oriented to person, place, and time. She appears well-developed and well-nourished. No distress.   HENT:   Head: Normocephalic and atraumatic.   Nose: Nose normal.   Mouth/Throat: Oropharynx is clear and moist. No oropharyngeal exudate.   Eyes: Conjunctivae and EOM are normal. Pupils are equal, round, and reactive to light. Right eye exhibits no discharge. Left eye exhibits no discharge.   Neck: Normal range of motion. Neck supple. No tracheal deviation present.   Cardiovascular: Normal rate, regular rhythm and normal heart sounds.  Exam reveals no gallop and no friction rub.    No murmur heard.  Pulmonary/Chest: Effort normal and breath sounds normal. No respiratory distress. She has no wheezes. She has no rales.   Abdominal: Soft. Bowel sounds are normal. She exhibits no  distension. There is no tenderness. There is no rebound and no guarding.   Genitourinary: No breast tenderness, discharge or bleeding.   Musculoskeletal: Normal range of motion. She exhibits no edema or tenderness.   Neurological: She is alert and oriented to person, place, and time. She has normal reflexes. No cranial nerve deficit. Coordination normal.   Skin: Skin is warm and dry. No rash noted. She is not diaphoretic. No erythema. No pallor.   Psychiatric: She has a normal mood and affect. Her behavior is normal. Thought content normal.   Nursing note and vitals reviewed.      LABS:  WBC   Date Value Ref Range Status   01/11/2017 6.13 3.90 - 12.70 K/uL Final     Hemoglobin   Date Value Ref Range Status   01/11/2017 10.2 (L) 12.0 - 16.0 g/dL Final     Hematocrit   Date Value Ref Range Status   01/11/2017 30.0 (L) 37.0 - 48.5 % Final     Platelets   Date Value Ref Range Status   01/11/2017 67 (L) 150 - 350 K/uL Final       Chemistry        Component Value Date/Time     01/11/2017 0941    K 4.4 01/11/2017 0941     01/11/2017 0941    CO2 29 01/11/2017 0941    BUN 48 (H) 01/11/2017 0941    CREATININE 7.1 (H) 01/11/2017 0941    GLU 91 01/11/2017 0941        Component Value Date/Time    CALCIUM 8.2 (L) 01/11/2017 0941    ALKPHOS 123 01/11/2017 0941    AST 43 (H) 01/11/2017 0941    ALT 50 (H) 01/11/2017 0941    BILITOT 0.7 01/11/2017 0941          Assessment:       1. Metastatic renal cell carcinoma, left    2. Malignant neoplasm metastatic to peritoneum    3. History of right oophorectomy    4. S/p nephrectomy left    5. Encounter for chemotherapy management    6. Chemotherapy-induced thrombocytopenia    7. Anemia in ESRD (end-stage renal disease)    8. ESRD on hemodialysis    9. Chemotherapy induced nausea and vomiting    10. Cancer associated pain        Plan:     1,2,3,4,5,6- Metastatic Renal Cell Carcinoma:    - It is unlikely that surgery removed all sites of metastatic disease.   Given that this is  c/w her previous papillary disease, pt started on oral cabozantinib 60mg daily (for dual VEGF and MET inhibition), especially given recent data that shows cabo is far superior to student in the first line setting.  If pt progresses on this regimen, will switch to nivolumab.     Platelet count 67,000. Pt instructed to hold cabozantinib. Due to patient's increase in abdominal pain, we will schedule imaging and then have pt to RTC to see me and Dr. Rivera.    7- Mild, will monitor.    8- Will send a message to Dr. Muñoz's staff to inform them of the patient's platelet count.    9- Zofran e-scribed.    10- Will start reduced dose Tramadol. Prescription e-scribed to pharmacy.        Case reviewed with Dr. Rivera and he agrees this is a reasonable plan of care. Patient is in agreement with the proposed treatment plan. All questions were answered to the patient's satisfaction. Pt knows to call clinic if anything is needed before the next clinic visit.    TONA Telles-RODRÍGUEZ  Hematology and Medical Oncology

## 2017-01-11 NOTE — TELEPHONE ENCOUNTER
Notified pt that Dr. Baron would be doing the pd cath removal tomorrow as well, after Dr. Todd.    Pt verbalizes understanding and will await a phone call from Dr. Todd's office in regards to her arrival time.

## 2017-01-12 ENCOUNTER — HOSPITAL ENCOUNTER (OUTPATIENT)
Facility: HOSPITAL | Age: 52
Discharge: HOME | End: 2017-01-12
Attending: SURGERY | Admitting: SURGERY
Payer: COMMERCIAL

## 2017-01-12 ENCOUNTER — ANESTHESIA (OUTPATIENT)
Dept: SURGERY | Facility: HOSPITAL | Age: 52
End: 2017-01-12
Payer: COMMERCIAL

## 2017-01-12 ENCOUNTER — ANESTHESIA EVENT (OUTPATIENT)
Dept: SURGERY | Facility: HOSPITAL | Age: 52
End: 2017-01-12
Payer: COMMERCIAL

## 2017-01-12 DIAGNOSIS — N18.6 ESRD (END STAGE RENAL DISEASE): ICD-10-CM

## 2017-01-12 PROCEDURE — D9220A PRA ANESTHESIA: Mod: ,,, | Performed by: ANESTHESIOLOGY

## 2017-01-12 PROCEDURE — 37000009 HC ANESTHESIA EA ADD 15 MINS: Performed by: SURGERY

## 2017-01-12 PROCEDURE — 88300 SURGICAL PATH GROSS: CPT | Mod: 26,,, | Performed by: PATHOLOGY

## 2017-01-12 PROCEDURE — 25000003 PHARM REV CODE 250: Performed by: NURSE ANESTHETIST, CERTIFIED REGISTERED

## 2017-01-12 PROCEDURE — 27200750 HC INSULATED NEEDLE/ STIMUPLEX: Performed by: STUDENT IN AN ORGANIZED HEALTH CARE EDUCATION/TRAINING PROGRAM

## 2017-01-12 PROCEDURE — 71000015 HC POSTOP RECOV 1ST HR: Performed by: SURGERY

## 2017-01-12 PROCEDURE — 63600175 PHARM REV CODE 636 W HCPCS: Performed by: SURGERY

## 2017-01-12 PROCEDURE — 63600175 PHARM REV CODE 636 W HCPCS: Performed by: NURSE ANESTHETIST, CERTIFIED REGISTERED

## 2017-01-12 PROCEDURE — 36000707: Performed by: SURGERY

## 2017-01-12 PROCEDURE — 25000003 PHARM REV CODE 250: Performed by: SURGERY

## 2017-01-12 PROCEDURE — 88300 SURGICAL PATH GROSS: CPT | Performed by: PATHOLOGY

## 2017-01-12 PROCEDURE — 37000008 HC ANESTHESIA 1ST 15 MINUTES: Performed by: SURGERY

## 2017-01-12 PROCEDURE — 71000016 HC POSTOP RECOV ADDL HR: Performed by: SURGERY

## 2017-01-12 PROCEDURE — C1768 GRAFT, VASCULAR: HCPCS | Performed by: SURGERY

## 2017-01-12 PROCEDURE — 27201423 OPTIME MED/SURG SUP & DEVICES STERILE SUPPLY: Performed by: SURGERY

## 2017-01-12 PROCEDURE — 36000706: Performed by: SURGERY

## 2017-01-12 PROCEDURE — 36830 ARTERY-VEIN NONAUTOGRAFT: CPT | Mod: ,,, | Performed by: SURGERY

## 2017-01-12 PROCEDURE — 63600175 PHARM REV CODE 636 W HCPCS

## 2017-01-12 PROCEDURE — 49422 REMOVE TUNNELED IP CATH: CPT | Mod: ,,, | Performed by: SURGERY

## 2017-01-12 DEVICE — GRAFT STRETCH TW RING 4-7 45C: Type: IMPLANTABLE DEVICE | Site: ARM | Status: FUNCTIONAL

## 2017-01-12 RX ORDER — OXYCODONE AND ACETAMINOPHEN 5; 325 MG/1; MG/1
1 TABLET ORAL EVERY 4 HOURS PRN
Qty: 60 TABLET | Refills: 0 | Status: SHIPPED | OUTPATIENT
Start: 2017-01-12 | End: 2017-03-01

## 2017-01-12 RX ORDER — DIPHENHYDRAMINE HYDROCHLORIDE 50 MG/ML
INJECTION INTRAMUSCULAR; INTRAVENOUS
Status: DISCONTINUED | OUTPATIENT
Start: 2017-01-12 | End: 2017-01-12

## 2017-01-12 RX ORDER — PROPOFOL 10 MG/ML
VIAL (ML) INTRAVENOUS CONTINUOUS PRN
Status: DISCONTINUED | OUTPATIENT
Start: 2017-01-12 | End: 2017-01-12

## 2017-01-12 RX ORDER — LIDOCAINE HYDROCHLORIDE 10 MG/ML
INJECTION INFILTRATION; PERINEURAL
Status: DISCONTINUED | OUTPATIENT
Start: 2017-01-12 | End: 2017-01-12 | Stop reason: HOSPADM

## 2017-01-12 RX ORDER — HEPARIN SODIUM 1000 [USP'U]/ML
INJECTION, SOLUTION INTRAVENOUS; SUBCUTANEOUS
Status: DISCONTINUED | OUTPATIENT
Start: 2017-01-12 | End: 2017-01-12

## 2017-01-12 RX ORDER — MIDAZOLAM HYDROCHLORIDE 1 MG/ML
INJECTION, SOLUTION INTRAMUSCULAR; INTRAVENOUS
Status: DISCONTINUED | OUTPATIENT
Start: 2017-01-12 | End: 2017-01-12

## 2017-01-12 RX ORDER — HEPARIN SODIUM 1000 [USP'U]/ML
INJECTION, SOLUTION INTRAVENOUS; SUBCUTANEOUS
Status: DISCONTINUED | OUTPATIENT
Start: 2017-01-12 | End: 2017-01-12 | Stop reason: HOSPADM

## 2017-01-12 RX ORDER — PROTAMINE SULFATE 10 MG/ML
INJECTION, SOLUTION INTRAVENOUS
Status: DISCONTINUED | OUTPATIENT
Start: 2017-01-12 | End: 2017-01-12

## 2017-01-12 RX ORDER — LIDOCAINE HCL/PF 100 MG/5ML
SYRINGE (ML) INTRAVENOUS
Status: DISCONTINUED | OUTPATIENT
Start: 2017-01-12 | End: 2017-01-12

## 2017-01-12 RX ORDER — DESMOPRESSIN ACETATE 4 UG/ML
20 INJECTION, SOLUTION INTRAVENOUS; SUBCUTANEOUS ONCE
Status: DISCONTINUED | OUTPATIENT
Start: 2017-01-12 | End: 2017-01-12

## 2017-01-12 RX ORDER — DEXAMETHASONE SODIUM PHOSPHATE 4 MG/ML
INJECTION, SOLUTION INTRA-ARTICULAR; INTRALESIONAL; INTRAMUSCULAR; INTRAVENOUS; SOFT TISSUE
Status: DISCONTINUED | OUTPATIENT
Start: 2017-01-12 | End: 2017-01-12

## 2017-01-12 RX ORDER — PROPOFOL 10 MG/ML
VIAL (ML) INTRAVENOUS
Status: DISCONTINUED | OUTPATIENT
Start: 2017-01-12 | End: 2017-01-12

## 2017-01-12 RX ORDER — METOPROLOL TARTRATE 1 MG/ML
INJECTION, SOLUTION INTRAVENOUS
Status: DISCONTINUED | OUTPATIENT
Start: 2017-01-12 | End: 2017-01-12

## 2017-01-12 RX ORDER — SODIUM CHLORIDE 9 MG/ML
INJECTION, SOLUTION INTRAVENOUS CONTINUOUS
Status: DISCONTINUED | OUTPATIENT
Start: 2017-01-12 | End: 2017-01-12 | Stop reason: HOSPADM

## 2017-01-12 RX ORDER — FENTANYL CITRATE 50 UG/ML
INJECTION, SOLUTION INTRAMUSCULAR; INTRAVENOUS
Status: DISCONTINUED | OUTPATIENT
Start: 2017-01-12 | End: 2017-01-12

## 2017-01-12 RX ORDER — OXYCODONE AND ACETAMINOPHEN 5; 325 MG/1; MG/1
1 TABLET ORAL EVERY 4 HOURS PRN
Status: DISCONTINUED | OUTPATIENT
Start: 2017-01-12 | End: 2017-01-12 | Stop reason: HOSPADM

## 2017-01-12 RX ORDER — LIDOCAINE HYDROCHLORIDE 10 MG/ML
1 INJECTION, SOLUTION EPIDURAL; INFILTRATION; INTRACAUDAL; PERINEURAL ONCE
Status: COMPLETED | OUTPATIENT
Start: 2017-01-12 | End: 2017-01-12

## 2017-01-12 RX ORDER — LIDOCAINE HYDROCHLORIDE 10 MG/ML
1 INJECTION, SOLUTION EPIDURAL; INFILTRATION; INTRACAUDAL; PERINEURAL ONCE
Status: DISCONTINUED | OUTPATIENT
Start: 2017-01-12 | End: 2017-01-12 | Stop reason: HOSPADM

## 2017-01-12 RX ADMIN — Medication 1 G: at 12:01

## 2017-01-12 RX ADMIN — FENTANYL CITRATE 25 MCG: 50 INJECTION, SOLUTION INTRAMUSCULAR; INTRAVENOUS at 03:01

## 2017-01-12 RX ADMIN — DESMOPRESSIN ACETATE 19.98 MCG: 4 INJECTION INTRAVENOUS at 01:01

## 2017-01-12 RX ADMIN — METOPROLOL TARTRATE 2 MG: 5 INJECTION, SOLUTION INTRAVENOUS at 01:01

## 2017-01-12 RX ADMIN — OXYCODONE HYDROCHLORIDE AND ACETAMINOPHEN 1 TABLET: 5; 325 TABLET ORAL at 04:01

## 2017-01-12 RX ADMIN — LIDOCAINE HYDROCHLORIDE 40 MG: 20 INJECTION, SOLUTION INTRAVENOUS at 12:01

## 2017-01-12 RX ADMIN — MEPIVACAINE HYDROCHLORIDE 30 ML: 15 INJECTION, SOLUTION EPIDURAL; INFILTRATION at 12:01

## 2017-01-12 RX ADMIN — SODIUM CHLORIDE: 0.9 INJECTION, SOLUTION INTRAVENOUS at 10:01

## 2017-01-12 RX ADMIN — DIPHENHYDRAMINE HYDROCHLORIDE 12.5 MG: 50 INJECTION, SOLUTION INTRAMUSCULAR; INTRAVENOUS at 01:01

## 2017-01-12 RX ADMIN — MIDAZOLAM HYDROCHLORIDE 1 MG: 1 INJECTION, SOLUTION INTRAMUSCULAR; INTRAVENOUS at 12:01

## 2017-01-12 RX ADMIN — FENTANYL CITRATE 25 MCG: 50 INJECTION, SOLUTION INTRAMUSCULAR; INTRAVENOUS at 12:01

## 2017-01-12 RX ADMIN — PROPOFOL 150 MCG/KG/MIN: 10 INJECTION, EMULSION INTRAVENOUS at 12:01

## 2017-01-12 RX ADMIN — METOPROLOL TARTRATE 1 MG: 5 INJECTION, SOLUTION INTRAVENOUS at 01:01

## 2017-01-12 RX ADMIN — LIDOCAINE HYDROCHLORIDE 0.2 MG: 10 INJECTION, SOLUTION EPIDURAL; INFILTRATION; INTRACAUDAL; PERINEURAL at 10:01

## 2017-01-12 RX ADMIN — DEXAMETHASONE SODIUM PHOSPHATE 8 MG: 4 INJECTION, SOLUTION INTRAMUSCULAR; INTRAVENOUS at 01:01

## 2017-01-12 RX ADMIN — FENTANYL CITRATE 25 MCG: 50 INJECTION, SOLUTION INTRAMUSCULAR; INTRAVENOUS at 02:01

## 2017-01-12 RX ADMIN — METOPROLOL TARTRATE 2 MG: 5 INJECTION, SOLUTION INTRAVENOUS at 12:01

## 2017-01-12 RX ADMIN — PROPOFOL 30 MG: 10 INJECTION, EMULSION INTRAVENOUS at 12:01

## 2017-01-12 RX ADMIN — HEPARIN SODIUM 3000 UNITS: 1000 INJECTION, SOLUTION INTRAVENOUS; SUBCUTANEOUS at 01:01

## 2017-01-12 RX ADMIN — PROTAMINE SULFATE 20 MG: 10 INJECTION, SOLUTION INTRAVENOUS at 02:01

## 2017-01-12 RX ADMIN — SODIUM CHLORIDE: 0.9 INJECTION, SOLUTION INTRAVENOUS at 01:01

## 2017-01-12 NOTE — IP AVS SNAPSHOT
[facility]  [address]  [facility] [address]  Phone: [phone]           Patient Discharge Instructions     Our goal is to set you up for success. This packet includes information on your condition, medications, and your home care. It will help you to care for yourself so you don't get sicker and need to go back to the hospital.     Please ask your nurse if you have any questions.        There are many details to remember when preparing to leave the hospital. Here is what you will need to do:    1. Take your medicine. If you are prescribed medications, review your Medication List in the following pages. You may have new medications to  at the pharmacy and others that you'll need to stop taking. Review the instructions for how and when to take your medications. Talk with your doctor or nurses if you are unsure of what to do.     2. Go to your follow-up appointments. Specific follow-up information is listed in the following pages. Your may be contacted by a transition nurse or clinical provider about future appointments. Be sure we have all of the phone numbers to reach you, if needed. Please contact your provider's office if you are unable to make an appointment.     3. Watch for warning signs. Your doctor or nurse will give you detailed warning signs to watch for and when to call for assistance. These instructions may also include educational information about your condition. If you experience any of warning signs to your health, call your doctor.               [facility]  Unless otherwise directed by your provider, please contact [facility], our nurse care line that is available for 24/7 assistance.     [phone] (toll-free)    Registered nurses in the [facility] provide clinical advisement, health education, appointment booking, and other advisory services.                    ** Verify the list of medication(s) below is accurate and up  to date. Carry this with you in case of emergency. If your medications have changed, please notify your healthcare provider.             Medication List      START taking these medications        Additional Info                      oxycodone-acetaminophen 5-325 mg per tablet   Commonly known as:  PERCOCET   Quantity:  60 tablet   Refills:  0   Dose:  1 tablet    Last time this was given:  1 tablet on 1/12/2017  4:28 PM   Instructions:  Take 1 tablet by mouth every 4 (four) hours as needed.     Begin Date    AM    Noon    PM    Bedtime         CHANGE how you take these medications        Additional Info                      polyethylene glycol 17 gram Pwpk   Commonly known as:  GLYCOLAX   Refills:  0   Dose:  17 g   What changed:  when to take this    Instructions:  Take 17 g by mouth 2 (two) times daily.     Begin Date    AM    Noon    PM    Bedtime         CONTINUE taking these medications        Additional Info                      acetaminophen 500 MG tablet   Commonly known as:  TYLENOL   Refills:  0   Dose:  500 mg    Instructions:  Take 500 mg by mouth every 6 (six) hours as needed for Pain.     Begin Date    AM    Noon    PM    Bedtime       aluminum-magnesium hydroxide-simethicone 200-200-20 mg/5 mL Susp   Commonly known as:  MAALOX   Refills:  0   Dose:  30 mL   Indications:  prn    Instructions:  Take 30 mLs by mouth every 6 (six) hours as needed.     Begin Date    AM    Noon    PM    Bedtime       cabozantinib 60 mg Tab   Quantity:  30 tablet   Refills:  5   Dose:  60 mg    Instructions:  Take 60 mg by mouth once daily.     Begin Date    AM    Noon    PM    Bedtime       calcitRIOL 0.25 MCG Cap   Commonly known as:  ROCALTROL   Quantity:  30 capsule   Refills:  11    Instructions:  TAKE ONE CAPSULE BY MOUTH ONCE DAILY     Begin Date    AM    Noon    PM    Bedtime       febuxostat 40 mg Tab   Commonly known as:  ULORIC   Quantity:  30 tablet   Refills:  5   Dose:  40 mg    Instructions:  Take 1 tablet (40  mg total) by mouth once daily.     Begin Date    AM    Noon    PM    Bedtime       lisinopril 40 MG tablet   Commonly known as:  PRINIVIL,ZESTRIL   Quantity:  30 tablet   Refills:  11    Instructions:  TAKE ONE TABLET BY MOUTH ONCE DAILY     Begin Date    AM    Noon    PM    Bedtime       METAMUCIL PLUS CALCIUM 1-60 gram-mg Cap   Refills:  0   Generic drug:  psyllium husk-calcium    Instructions:  Take by mouth. 2 Capsule Oral Every morning     Begin Date    AM    Noon    PM    Bedtime       nifedipine 60 MG (OSM) 24 hr tablet   Commonly known as:  PROCARDIA-XL   Quantity:  30 tablet   Refills:  11   Dose:  60 mg    Instructions:  Take 1 tablet (60 mg total) by mouth once daily.     Begin Date    AM    Noon    PM    Bedtime       ondansetron 8 MG Tbdl   Commonly known as:  ZOFRAN-ODT   Quantity:  30 tablet   Refills:  1   Dose:  8 mg    Instructions:  Take 1 tablet (8 mg total) by mouth every 12 (twelve) hours as needed.     Begin Date    AM    Noon    PM    Bedtime       ONE-A-DAY WOMENS FORMULA 27-0.4 mg Tab   Refills:  0   Generic drug:  multivitamin-Ca-iron-minerals    Instructions:  Take by mouth. 1 Tablet Oral Every day     Begin Date    AM    Noon    PM    Bedtime       tramadol 50 mg tablet   Commonly known as:  ULTRAM   Quantity:  30 tablet   Refills:  0   Dose:  25 mg    Instructions:  Take 0.5 tablets (25 mg total) by mouth every 12 (twelve) hours as needed for Pain.     Begin Date    AM    Noon    PM    Bedtime            Where to Get Your Medications      You can get these medications from any pharmacy     Bring a paper prescription for each of these medications     oxycodone-acetaminophen 5-325 mg per tablet                  Please bring to all follow up appointments:    1. A copy of your discharge instructions.  2. All medicines you are currently taking in their original bottles.  3. Identification and insurance card.    Please arrive 15 minutes ahead of scheduled appointment time.    Please call 24  hours in advance if you must reschedule your appointment and/or time.        Your Scheduled Appointments     Jan 18, 2017  3:40 PM CST   Established Patient Visit with Williams Hart MD   [facility]    [address]  [place] [address]   [phone]            Feb 22, 2017  4:00 PM CST   Established Patient Visit with Williams Hart MD   [facility]    [address]  [place] [address]   [phone]            Mar 29, 2017  4:00 PM CDT   Established Patient Visit with Williams Hart MD   [facility]    [address]  [place] [address]   [phone]            Jeff 15, 2017  9:40 AM CDT   Established Patient Visit with Alison Rivera MD   [facility] [address] [id number] [phone]              Follow-up Information     Follow up with FARIDEH Muñoz Iii, MD. Schedule an appointment as soon as possible for a visit in 2 weeks.    Specialty:  Vascular Surgery    Why:  post-op AV graft    Contact information:    [address]  [facility] [address] [phone]          Discharge Instructions     Future Orders    Activity as tolerated     Call MD for:  persistent nausea and vomiting or diarrhea     Call MD for:  redness, tenderness, or signs of infection (pain, swelling, redness, odor or green/yellow discharge around incision site)     Call MD for:  severe uncontrolled pain     Call MD for:  temperature >100.4     Diet general     Questions:    Total calories:      Fat restriction, if any:      Protein restriction, if any:      Na restriction, if any:      Fluid restriction:      Additional restrictions:      Shower on day dressing removed (No bath)     Comments:    Keep incisions clean and dry for 72 hours.  After 48 hours you may shower over the incisions; however, do not submerge incisions under water (for example pool, bath, lake, etc) for a total of 2  weeks after surgery.        Discharge Instructions         Arteriovenous (AV) Fistula for Dialysis  An AV fistula is a connection between an artery and a vein. For this procedure, an AV fistula is surgically created using an artery and a vein in your arm. (Your doctor will let you know if another site is to be used.) When the artery and vein are joined, blood flow increases from the artery into the vein. As a result, the vein enlarges over time. The enlarged vein provides easier access to the blood for dialysis (a treatment for kidney failure). This sheet explains the procedure and what to expect.     An AV fistula increases blood flow from the artery into the vein. Over time, the vein becomes stronger and enlarged.   Preparing for the Procedure  Prepare as you have been told. In addition:  · Tell your doctor about all medications you take. This includes over-the-counter drugs. It also includes herbs and other supplements. You may need to stop taking some or all of them before the procedure.  · Follow any directions youre given for not eating or drinking before the procedure.  · Do not allow anyone to draw blood from or take blood pressure on the arm that will have the fistula prior to the procedure.  The Day of the Procedure  The procedure takes about 1-2 hours. Youll likely go home the same day.  Before the procedure begins:  · An IV line is put into a vein in the arm or hand not being used for the procedure. This line supplies fluids and medications.  · To keep you free of pain during the procedure, youre given general anesthesia. This medication puts you into a state like deep sleep through the procedure. Or a nerve block may be used. This medication numbs the arm. With it, you may also be given medication that makes you relaxed and drowsy through the procedure.  During the procedure:  · The skin over your arm may be injected with numbing medication.  · One or more small incisions are then made through the  numbed skin. This depends on the size of your arm and the depth of the vein in your arm.  · The vein is attached to the selected artery.  · Any incisions made are then closed with sutures (stitches), staples, surgical glue, or strips of surgical tape.  After the procedure:  · Youll be asked to keep your arm raised (elevated) as often as possible for at least a week after the procedure.  · Youll be given medications to manage pain as needed.  · Your arm and hand will be checked to make sure blood is flowing through the fistula properly. The feeling of blood rushing through the fistula is called a thrill. It is somewhat similar to the purring of a cat. Youll be taught how to check for this feeling each day to make sure there are no problems with your fistula. Youll also be taught how to care for your fistula at home.  · When its time for you to leave the hospital, have an adult family member or friend ready to drive you home.  Recovering at Home  Once at home, follow all of the instructions youve been given. Be sure to:  · Take all medications as directed.  · Care for your incision as instructed.  · Check for signs of infection at the incision site (see below).  · Avoid heavy lifting and strenuous activities as directed.  · Monitor and care for your fistula as instructed.  · Do your hand and arm exercises as instructed. This usually involves squeezing a ball in your hand for a few minutes each hour.  Call Your Health Care Provider If You Have Any of the Following:  · Fever of 100.4°F or higher  · Signs of infection at the incision site, such as increased redness or swelling, warmth, worsening pain, bleeding, or foul-smelling drainage  · Lack of a thrill (you cant feel it)  · Pain or numbness in your fingers, hand, or arm  · Bleeding, redness, or warmth around your fistula  · Sudden bulging of the fistula (more than usual; a slight bulge is normal)   Follow-Up  Your health care provider will check your fistula  within 1 to 2 weeks after the procedure. It will likely take about 6 to 8 weeks for the fistula to enlarge enough to start dialysis. After that, make sure the fistula is checked each time you have dialysis. Your health care provider may also suggest checkups every 6 months.  Risks and Possible Complications Include:  · Failure of the fistula to work properly  · Long wait before the fistula is ready (up to 6 months)  · Coldness or numbness in the hand (due to blood flowing away from the hand and into the fistula)  · An unsightly bump under the skin (due to enlargement of the fistula)  · Prolonged bleeding from the fistula after dialysis  · Narrowing or weakening of the blood vessels used for the fistula  · Formation of blood clots in the blood vessels used for the fistula  · Risks of anesthesia or any other medications used during the procedure   Living with an AV Fistula  A problem, such as narrowing of the vein (stricture) or infection, can make the fistula unusable. If this happens, you may need other treatments to repair or make a new fistula. To protect your fistula, follow these and any other guidelines youre given:  · Check your fistula as often as your health care provider says. If you cant feel your thrill, let he or she know right away.  · Make sure your fistula is checked before each dialysis treatment.  · Dont let anyone draw blood from or take blood pressure on the arm that has the fistula.  · Wash your hands often and keep the area around your fistula clean.  · Dont sleep on the arm that has the fistula.  · Dont wear tight jewelry or a watch on the arm with your fistula.  · Protect your fistula from cuts, scrapes, or blows.  © 2480-0785 [employer] [address] [place] [address], [place] [address]. All rights reserved. This information is not intended as a substitute for professional medical care. Always follow your healthcare professional's instructions.            Primary Diagnosis      "Your primary diagnosis was:  End Stage Kidney Disease      Admission Information     Date & Time Provider Department CSN    1/12/2017 10:16 AM FARIDEH Muñoz III, MD [facility] [id number]      Care Providers     Provider Role Specialty Primary office phone    FARIDEH Muñoz III, MD Attending Provider Vascular Surgery [phone]    FARIDEH Muñoz III, MD Surgeon  Vascular Surgery [phone]    Vaughn Baron MD Surgeon  General Surgery [phone]      Your Vitals Were     BP Pulse Temp Resp Height Weight    168/96 86 98.1 °F (36.7 °C) 16 5' 4" (1.626 m) 64 kg (141 lb)    SpO2 BMI             98% 24.2 kg/m2         Recent Lab Values        10/9/2015 9/8/2016                       11:25 AM  8:10 AM          A1C 6.0 5.1          Comment for A1C at  8:10 AM on 9/8/2016:  According to ADA guidelines, hemoglobin A1C <7.0% represents  optimal control in non-pregnant diabetic patients.  Different  metrics may apply to specific populations.   Standards of Medical Care in Diabetes - 2016.  For the purpose of screening for the presence of diabetes:  <5.7%     Consistent with the absence of diabetes  5.7-6.4%  Consistent with increasing risk for diabetes   (prediabetes)  >or=6.5%  Consistent with diabetes  Currently no consensus exists for use of hemoglobin A1C  for diagnosis of diabetes for children.        Pending Labs     Order Current Status    Specimen to Pathology - Surgery Collected (01/12/17 5617)      Allergies as of 1/12/2017        Reactions    Allopurinol     Other reaction(s): abnormal transaminases      Advance Directives     An advance directive is a document which, in the event you are no longer able to make decisions for yourself, tells your healthcare team what kind of treatment you do or do not want to receive, or who you would like to make those decisions for you.  If you do not currently have an advance directive, [facility] encourages you to create one.  For more information " call:  [phone] ([phone]), [phone] ([phone]),  or log on to [email or web address].        Language Assistance Services     ATTENTION: Language assistance services are available, free of charge. Please call [phone].      ATENCIÓN: Si habla español, tiene a bonilla disposición servicios gratuitos de asistencia lingüística. Llame al [phone].     CHÚ Ý: N?u b?n nói Ti?ng Vi?t, có các d?ch v? h? tr? ngôn ng? mi?n phí dành cho b?n. G?i s? [phone].        Chronic Kindey Disease Education              [facility] complies with applicable Federal civil rights laws and does not discriminate on the basis of race, color, national origin, age, disability, or sex.

## 2017-01-12 NOTE — ANESTHESIA PROCEDURE NOTES
Supraclavicular Nerve Single Shot Block    Patient location during procedure: OR    Reason for block: primary anesthetic   Diagnosis: ESRD   Start time: 1/12/2017 12:17 PM  Timeout: 1/12/2017 12:17 PM   End time: 1/12/2017 12:25 PM  Staffing  Anesthesiologist: JASVIR VASQUEZ  Resident/CRNA: EMILE FAUSTIN  Performed by: resident/CRNA   Preanesthetic Checklist  Completed: patient identified, site marked, surgical consent, pre-op evaluation, timeout performed, IV checked, risks and benefits discussed and monitors and equipment checked  Peripheral Block  Patient position: supine  Prep: ChloraPrep  Patient monitoring: heart rate, cardiac monitor, continuous pulse ox, continuous capnometry and frequent blood pressure checks  Block type: supraclavicular  Laterality: left  Injection technique: single shot  Needle  Needle type: Stimuplex   Needle gauge: 22 G  Needle length: 2 in  Needle localization: anatomical landmarks and ultrasound guidance   -ultrasound image captured on disc.  Assessment  Injection assessment: negative aspiration, negative parasthesia and local visualized surrounding nerve  Paresthesia pain: none  Heart rate change: no  Slow fractionated injection: yes  Medications:  Bolus administered: 30 mL of 1.5 mepivacaine  Epinephrine added: 3.75 mcg/mL (1/300,000)  Additional Notes  Intercostal brachial field block performed for additional coverage.  VSS, see anesthesia record.  Patient tolerated procedure well.

## 2017-01-12 NOTE — PLAN OF CARE
Discharge instructions reviewed w/ pt and , verbalized understanding. Pt in NADN. States pain tolerable at this time. Tolerated liquids w/ no issues. Rx given. Dr. Esteves came and assessed pt prior to her leaving, he saw pts small hematoma on R arm, ok to d/c home. To be d/c'd home w/ .

## 2017-01-12 NOTE — TRANSFER OF CARE
"Anesthesia Transfer of Care Note    Patient: Eva Bloom    Procedure(s) Performed: Procedure(s) (LRB):  [id number] LEFT UPPER EXTREMITY (Left)  REMOVAL-CATHETER-PERITONEAL DIALYSIS (N/A)    Patient location: [facility]    Anesthesia Type: regional    Transport from OR: Transported from OR on room air with adequate spontaneous ventilation    Post pain: adequate analgesia    Post assessment: no apparent anesthetic complications and tolerated procedure well    Post vital signs: stable    Level of consciousness: sedated and responds to stimulation    Nausea/Vomiting: no nausea/vomiting    Complications: none    Comments: Transported to [facility] phase 2      Last vitals:   Visit Vitals    BP (!) 159/87    Pulse 86    Temp 36.6 °C (97.9 °F)    Resp 16    Ht 5' 4" (1.626 m)    Wt 64 kg (141 lb)    SpO2 97%    Breastfeeding No    BMI 24.2 kg/m2     "

## 2017-01-12 NOTE — BRIEF OP NOTE
[facility]  Brief Operative Note     SUMMARY     Surgery Date: 1/12/2017     Surgeon(s) and Role:     * Vaughn Baron MD - Primary     * Yue Anderson MD - Resident - Assisting        Pre-op Diagnosis:  End-stage renal disease on peritoneal dialysis [N18.6, Z99.2]    Post-op Diagnosis:  Post-Op Diagnosis Codes:     * End-stage renal disease on peritoneal dialysis [N18.6, Z99.2]    Procedure(s) (LRB):  [id number] LEFT UPPER EXTREMITY (Left)  REMOVAL-CATHETER-PERITONEAL DIALYSIS (N/A)    Anesthesia: Regional    Description of the findings of the procedure: peritoneal dialysis catheter removed, tip intact    Findings/Key Components: see op note    Estimated Blood Loss: 5 mL         Specimens:   Specimen (12h ago through future)    Start     Ordered    01/12/17 1551  Specimen to Pathology - Surgery  Once     Comments:  1) Peritoneal Dialysis Catheter - GROSS, in specimen cup, no preservatives, placed in specimen refrigerator.    01/12/17 1551

## 2017-01-12 NOTE — BRIEF OP NOTE
[facility]  Brief Operative Note     SUMMARY     Surgery Date: 1/12/2017     Surgeon(s) and Role:  Panel 1:     * FARIDEH Muñoz III, MD - Primary     * Augustin Esteves Jr., MD - Resident - Assisting        Pre-op Diagnosis:  End-stage renal disease on peritoneal dialysis [N18.6, Z99.2]    Post-op Diagnosis:  Post-Op Diagnosis Codes:     * End-stage renal disease on peritoneal dialysis [N18.6, Z99.2]    PROCEDURES:    1.  L upper arm 4-7 taper AVG placment    Anesthesia: Regional    Description of the findings of the procedure: 3.5 mm brachial artery; exceptionally thin ~4 mm brachial vein at Axilla    Findings/Key Components: soft thrill, good biphasic radial signal with no augmentation with graft occlusion    Estimated Blood Loss: 30cc    CODING note: -22 modifier appropriate b/c of increased time and complexity of this case due to fragility of the vein, and prolonged oozing b/c of thrombocytomenia         Specimens:   Specimen     None          Discharge Note    SUMMARY     Admit Date: 1/12/2017    Discharge Date and Time: 1/12/17    Hospital Course (synopsis of major diagnoses, care, treatment, and services provided during the course of the hospital stay): successful outpatient procedure     Final Diagnosis: Post-Op Diagnosis Codes:     * End-stage renal disease on peritoneal dialysis [N18.6, Z99.2]    Disposition: home    Follow Up/Patient Instructions: Diet: renal  Act: ad humberto  FU: 2 week with AVF duplex     Medications: pre-op + analgesic

## 2017-01-12 NOTE — OP NOTE
DATE OF PROCEDURE: 1/12/2017    PREOPERATIVE DIAGNOSIS:   1. End stage renal disease on dialysis  2. Peritoneal dialysis catheter in place    POSTOPERATIVE DIAGNOSIS:   1. End stage renal disease on dialysis  2. Peritoneal dialysis catheter in place    PROCEDURES PERFORMED:  1. Removal peritoneal dialysis catheter    ATTENDING SURGEON: Dr Vaughn Baron    RESIDENT: Dr Yue Anderson    ANESTHESIA: Local/MAC    KEY FINDINGS: peritoneal dialysis catheter removed with tip intact    ESTIMATED BLOOD LOSS: 5 ml    DRAINS: None    COMPLICATIONS: None    INDICATIONS FOR PROCEDURE: The patient is a 51 y.o. female with end stage renal disease who had previously had a peritoneal dialysis catheter placed which she was no longer using. Based on this surgery was indicated.    PROCEDURE IN DETAIL: After informed consent was obtained, the patient was brought to the Operative Theater and placed in in the supine position. After vascular surgery had completed their portion of the procedure we were called in the room. The patient was re-prepped and draped in the usual sterile fashion. A timeout procedure was performed. We placed a hemostat on the externalized catheter which had been prepped into the field at the skin level and cut the excess catheter. The skin overlying the catheter cuff was injected with local anesthesia. A 2 cm incision was then made overlying the catheter through the previous incision site. We dissected through the subcutaneous tissue until we could get around the catheter. The catheter was then cut so that the distal catheter was removed through its skin incision. The proximal and distal cuff were then dissected free and the intra-abdominal portion of the catheter was removed intact. The defect was closed with a 0 Vicryl figure of eight stitch. There was good hemostasis. The skin was closed in layers with 3-0 Vicryl and 4-0 Vicryl. The catheter skin exit site was left open. Sterile dressings were applied.  The patient tolerated the procedure well and was transported to the recovery room in stable condition.

## 2017-01-12 NOTE — H&P (VIEW-ONLY)
REFERRING PHYSICIAN:  Williams Hart M.D.    HISTORY OF PRESENT ILLNESS:  A 51-year-old female with end-stage renal disease   who has been undergoing peritoneal dialysis since February, who now is not using   any peritoneal dialysis or hemodialysis, who is referred for AV access   placement.  She is right-handed.  She has no history of central venous   cannulation other than a right IJ PermCath when she initiated dialysis in early   2016.    PAST MEDICAL HISTORY:  1.  Hypertension.  2.  Hyperparathyroidism.  3.  Prediabetes.    PAST SURGICAL HISTORY:  1.  Tonsillectomy.  2.  Breast lumpectomy.  3.  Laparoscopic nephrectomy.  4.  PD placement.  5.  Oophorectomy.    FAMILY HISTORY:  Positive for diabetes and hypertension.    SOCIAL HISTORY:  Nonsmoker.    MEDICATIONS:  No anticoagulants or antiplatelet agents.  See EPIC for full list.    ALLERGIES:  Allopurinol.    REVIEW OF SYSTEMS:  CARDIOVASCULAR:  Denies postprandial pain or DVT.  All other systems including eyes, ENT, , musculoskeletal, breast, psychiatric,   lymph, allergy and immune are negative.    PHYSICAL EXAMINATION:  VITAL SIGNS:  See nursing note.  GENERAL:  She is in no acute distress.  RESPIRATORY:  Normal effort.  Clear to auscultation.  CARDIOVASCULAR:  Regular rate and rhythm, nondisplaced PMI, no murmur.  VASCULAR:  2+ radial, brachial and femoral pulses.  EXTREMITIES:  Arms do not show ballottable veins.  NEUROLOGIC:  Cranial nerves II-XII intact, 5/5 muscle strength in all   extremities.    IMAGING:  Vein mapping reveals very small cephalic and basilic veins.  The   largest of these is a left cephalic which is 2.3 at the antecubital fossa, but   only 1.8 in the proximal arm.    ASSESSMENT:  Poor candidate for AV fistula due to diminutive vein size.  It is   possible that the left cephalic vein may look better after a block is placed,   but likely she will need an AV graft.    PLAN:  1.  Left upper arm loop AV graft placement, 01/12/2017.  2.   Regional block.    We will interrogate the left cephalic vein with ultrasound immediately prior to   beginning of this procedure, and if the left cephalic vein appeared better than   this preoperative vein mapping then we would proceed with brachiocephalic   fistula instead.    The patient understands the risks and rationale of the procedure and wishes to   Proceed.    ADDENDUM: Dr Baron wishes to remove her PD catheter with same anesthetic.    BEAR  dd: 01/06/2017 12:11:18 (CST)  td: 01/06/2017 12:52:13 (CST)  Doc ID   #[id number]  Job ID #[id number]    CC:

## 2017-01-12 NOTE — ANESTHESIA PREPROCEDURE EVALUATION
01/12/2017  vEa Bloom is a 51 y.o., female with PMH significant for ESRD on HD who was found to have two L renal masses. She underwent L lap nephrectomy on 1/12/16. 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 Margins were negative. Shehealed from surgery well, but then developed a large ovarian mass. This was removed by gyn along with multiple sites of metastatic disease in the pelvis. Path was c/w recurrence of RCC. Patient is now scheduled for the below procedure:     Pre-operative evaluation for Procedure(s) (LRB):  [id number] LEFT UPPER EXTREMITY (Left)  REMOVAL-CATHETER-PERITONEAL DIALYSIS (N/A)    Previous intubation: Present Prior to Hospital Arrival?: No; Placement Date: 11/22/16; Placement Time: 0941; Method of Intubation: Pandya; Inserted by: CRNA; Airway Device: Endotracheal Tube; Mask Ventilation: Easy; Intubated: Postinduction; Blade: Tavo #3; Airway Device Size: 7.0; Style: Cuffed; Cuff Inflation: Minimal occlusive pressure; Placement Verified By: Auscultation, Capnometry; Grade: Grade I; Complicating Factors: None; Intubation Findings: Positive EtCO2, Bilateral breath sounds, Atraumatic/Condition of teeth unchanged;  Depth of Insertion: 21; Securment: Lips; Complications: None; Breath Sounds: Equal Bilateral; Insertion Attempts: 1      Eva Bloom is a 51 y.o. female     Patient Active Problem List   Diagnosis    ESRD on peritoneal dialysis    Kidney stones    Hyperparathyroidism, secondary renal    Hyperuricemia without signs of inflammatory arthritis and tophaceous disease    Proteinuria    Urate nephropathy    Essential hypertension    Pap smear abnormality of cervix with ASCUS favoring benign    CMV (cytomegalovirus) antibody  positive    Herpes simplex type 1 antibody positive    H/O herpes simplex type 2 infection    Overweight (BMI 25.0-29.9)    Prediabetes    Multinodular goiter    History of kidney cancer    Patient on waiting list for kidney transplant    S/p nephrectomy left    History of abnormal cervical Pap smear       Review of patient's allergies indicates:   Allergen Reactions    Allopurinol      Other reaction(s): abnormal transaminases       No current facility-administered medications on file prior to encounter.      Current Outpatient Prescriptions on File Prior to Encounter   Medication Sig Dispense Refill    lisinopril (PRINIVIL,ZESTRIL) 40 MG tablet TAKE ONE TABLET BY MOUTH ONCE DAILY 30 tablet 11    nifedipine (PROCARDIA-XL) 60 MG (OSM) 24 hr tablet Take 1 tablet (60 mg total) by mouth once daily. 30 tablet 11    polyethylene glycol (GLYCOLAX) 17 gram PwPk Take 17 g by mouth 2 (two) times daily. (Patient taking differently: Take 17 g by mouth once daily. )  0    psyllium husk-calcium (METAMUCIL PLUS CALCIUM) 1-60 gram-mg Cap Take by mouth. 2 Capsule Oral Every morning      acetaminophen (TYLENOL) 500 MG tablet Take 500 mg by mouth every 6 (six) hours as needed for Pain.      aluminum-magnesium hydroxide-simethicone (MAALOX) 200-200-20 mg/5 mL Susp Take 30 mLs by mouth every 6 (six) hours as needed.       cabozantinib 60 mg Tab Take 60 mg by mouth once daily. 30 tablet 5    calcitRIOL (ROCALTROL) 0.25 MCG Cap TAKE ONE CAPSULE BY MOUTH ONCE DAILY 30 capsule 11    febuxostat (ULORIC) 40 mg Tab Take 1 tablet (40 mg total) by mouth once daily. 30 tablet 5    multivitamin-Ca-iron-minerals (ONE-A-DAY WOMENS FORMULA) 27-0.4 mg Tab Take by mouth. 1 Tablet Oral Every day         Past Surgical History   Procedure Laterality Date    Tonsillectomy      Breast surgery       LUMPECTOMY    Tubal ligation      Colonoscopy N/A 11/12/2015    Nephrectomy-laparoscopic Left 01/12/2016    Peritoneal catheter  insertion      Salpingoophorectomy Right 2016     KJB---GENE       Social History     Social History    Marital status:      Spouse name: N/A    Number of children: 2    Years of education: N/A     Occupational History    [id number]     Social History Main Topics    Smoking status: Never Smoker    Smokeless tobacco: Never Used    Alcohol use No      Comment: . 2 children. works at [employer].    Drug use: No    Sexual activity: Yes     Partners: Male     Other Topics Concern    Not on file     Social History Narrative         Vital Signs Range (Last 24H):  Temp:  [36.6 °C (97.9 °F)]   Pulse:  [86]   Resp:  [16]   BP: (159)/(94)   SpO2:  [100 %]       CBC:   Recent Labs      01/11/17   0941   WBC  6.13   RBC  3.66*   HGB  10.2*   HCT  30.0*   PLT  67*   MCV  82   MCH  27.9   MCHC  34.0       CMP:   Recent Labs      01/11/17   0941   NA  139   K  4.4   CL  103   CO2  29   BUN  48*   CREATININE  7.1*   GLU  91   CALCIUM  8.2*   ALBUMIN  2.7*   PROT  7.3   ALKPHOS  123   ALT  50*   AST  43*   BILITOT  0.7       INR  No results for input(s): INR, PROTIME, APTT in the last 72 hours.    Invalid input(s): PT        Diagnostic Studies:  EKG (6/4/2016):  Normal sinus rhythm  Normal ECG  When compared with ECG of 20-JAN-2016 14:58,  Significant changes have occurred  Confirmed by Salvatore Austin MD ([id number]) on 6/10/2016 11:50:15 PM    2D Echo (10/7/2016):  CONCLUSIONS     1 - Normal left ventricular systolic function (EF 55-60%).     2 - Normal right ventricular systolic function .     3 - Normal left ventricular diastolic function.     4 - The estimated PA systolic pressure is 22 mmHg.     [facility] Anesthesia Evaluation    I have reviewed the Patient Summary Reports.    I have reviewed the Nursing Notes.   I have reviewed the Medications.     Review of Systems  Anesthesia Hx:  No problems with previous Anesthesia  History of prior surgery of interest to airway management or planning:  Denies Family Hx of Anesthesia complications.   Denies Personal Hx of Anesthesia complications.   Hematology/Oncology:         -- Anemia: --  Cancer in past history (RCC):  surgery    Cardiovascular:   Hypertension CAD   Positive stress echo test 10/15  Repeat Cardiac PET stress 12/16 negative for ischemia   Pulmonary:  Pulmonary Normal    Renal/:   Chronic Renal Disease, ESRD, Dialysis renal calculi    Neurological:  Neurology Normal    Endocrine:  Endocrine Normal        Physical Exam  General:  Well nourished    Airway/Jaw/Neck:  Airway Findings: Mouth Opening: Normal Tongue: Normal  General Airway Assessment: Adult  Mallampati: II  TM Distance: Normal, at least 6 cm      Dental:  Dental Findings: In tact   Chest/Lungs:  Chest/Lungs Findings: Clear to auscultation, Normal Respiratory Rate     Heart/Vascular:  Heart Findings: Rate: Normal  Rhythm: Regular Rhythm  Sounds: Normal  Heart murmur: negative       Mental Status:  Mental Status Findings:  Alert and Oriented, Cooperative         Anesthesia Plan  Type of Anesthesia, risks & benefits discussed:  Anesthesia Type:  regional  Patient's Preference:   Intra-op Monitoring Plan: standard ASA monitors  Intra-op Monitoring Plan Comments:   Post Op Pain Control Plan: single-shot nerve block  Post Op Pain Control Plan Comments:   Induction:   IV  Beta Blocker:  Patient is not currently on a Beta-Blocker (No further documentation required).       Informed Consent: Patient understands risks and agrees with Anesthesia plan.  Questions answered. Anesthesia consent signed with patient.  ASA Score: 3     Day of Surgery Review of History & Physical:            Ready For Surgery From Anesthesia Perspective.

## 2017-01-12 NOTE — IP AVS SNAPSHOT
[address]  [address] [place] [address]  Phone: [phone]           I have received a copy of my After Visit Summary and discharge instructions from [facility].    INSTRUCTIONS RECEIVED AND UNDERSTOOD BY:                     Patient/Patient Representative: ________________________________________________________________     Date/Time: ________________________________________________________________                     Instructions Given By: ________________________________________________________________     Date/Time: ________________________________________________________________

## 2017-01-12 NOTE — DISCHARGE INSTRUCTIONS
Arteriovenous (AV) Fistula for Dialysis  An AV fistula is a connection between an artery and a vein. For this procedure, an AV fistula is surgically created using an artery and a vein in your arm. (Your doctor will let you know if another site is to be used.) When the artery and vein are joined, blood flow increases from the artery into the vein. As a result, the vein enlarges over time. The enlarged vein provides easier access to the blood for dialysis (a treatment for kidney failure). This sheet explains the procedure and what to expect.     An AV fistula increases blood flow from the artery into the vein. Over time, the vein becomes stronger and enlarged.   Preparing for the Procedure  Prepare as you have been told. In addition:  · Tell your doctor about all medications you take. This includes over-the-counter drugs. It also includes herbs and other supplements. You may need to stop taking some or all of them before the procedure.  · Follow any directions youre given for not eating or drinking before the procedure.  · Do not allow anyone to draw blood from or take blood pressure on the arm that will have the fistula prior to the procedure.  The Day of the Procedure  The procedure takes about 1-2 hours. Youll likely go home the same day.  Before the procedure begins:  · An IV line is put into a vein in the arm or hand not being used for the procedure. This line supplies fluids and medications.  · To keep you free of pain during the procedure, youre given general anesthesia. This medication puts you into a state like deep sleep through the procedure. Or a nerve block may be used. This medication numbs the arm. With it, you may also be given medication that makes you relaxed and drowsy through the procedure.  During the procedure:  · The skin over your arm may be injected with numbing medication.  · One or more small incisions are then made through the numbed skin. This depends on the size of your arm and the  depth of the vein in your arm.  · The vein is attached to the selected artery.  · Any incisions made are then closed with sutures (stitches), staples, surgical glue, or strips of surgical tape.  After the procedure:  · Youll be asked to keep your arm raised (elevated) as often as possible for at least a week after the procedure.  · Youll be given medications to manage pain as needed.  · Your arm and hand will be checked to make sure blood is flowing through the fistula properly. The feeling of blood rushing through the fistula is called a thrill. It is somewhat similar to the purring of a cat. Youll be taught how to check for this feeling each day to make sure there are no problems with your fistula. Youll also be taught how to care for your fistula at home.  · When its time for you to leave the hospital, have an adult family member or friend ready to drive you home.  Recovering at Home  Once at home, follow all of the instructions youve been given. Be sure to:  · Take all medications as directed.  · Care for your incision as instructed.  · Check for signs of infection at the incision site (see below).  · Avoid heavy lifting and strenuous activities as directed.  · Monitor and care for your fistula as instructed.  · Do your hand and arm exercises as instructed. This usually involves squeezing a ball in your hand for a few minutes each hour.  Call Your Health Care Provider If You Have Any of the Following:  · Fever of 100.4°F or higher  · Signs of infection at the incision site, such as increased redness or swelling, warmth, worsening pain, bleeding, or foul-smelling drainage  · Lack of a thrill (you cant feel it)  · Pain or numbness in your fingers, hand, or arm  · Bleeding, redness, or warmth around your fistula  · Sudden bulging of the fistula (more than usual; a slight bulge is normal)   Follow-Up  Your health care provider will check your fistula within 1 to 2 weeks after the procedure. It will likely  take about 6 to 8 weeks for the fistula to enlarge enough to start dialysis. After that, make sure the fistula is checked each time you have dialysis. Your health care provider may also suggest checkups every 6 months.  Risks and Possible Complications Include:  · Failure of the fistula to work properly  · Long wait before the fistula is ready (up to 6 months)  · Coldness or numbness in the hand (due to blood flowing away from the hand and into the fistula)  · An unsightly bump under the skin (due to enlargement of the fistula)  · Prolonged bleeding from the fistula after dialysis  · Narrowing or weakening of the blood vessels used for the fistula  · Formation of blood clots in the blood vessels used for the fistula  · Risks of anesthesia or any other medications used during the procedure   Living with an AV Fistula  A problem, such as narrowing of the vein (stricture) or infection, can make the fistula unusable. If this happens, you may need other treatments to repair or make a new fistula. To protect your fistula, follow these and any other guidelines youre given:  · Check your fistula as often as your health care provider says. If you cant feel your thrill, let he or she know right away.  · Make sure your fistula is checked before each dialysis treatment.  · Dont let anyone draw blood from or take blood pressure on the arm that has the fistula.  · Wash your hands often and keep the area around your fistula clean.  · Dont sleep on the arm that has the fistula.  · Dont wear tight jewelry or a watch on the arm with your fistula.  · Protect your fistula from cuts, scrapes, or blows.  © 3006-5865 [employer]. [address] [place] [address], [place] [address]. All rights reserved. This information is not intended as a substitute for professional medical care. Always follow your healthcare professional's instructions.

## 2017-01-13 VITALS
OXYGEN SATURATION: 98 % | HEIGHT: 64 IN | WEIGHT: 141 LBS | RESPIRATION RATE: 16 BRPM | BODY MASS INDEX: 24.07 KG/M2 | HEART RATE: 83 BPM | TEMPERATURE: 98 F | DIASTOLIC BLOOD PRESSURE: 94 MMHG | SYSTOLIC BLOOD PRESSURE: 173 MMHG

## 2017-01-13 NOTE — ANESTHESIA POSTPROCEDURE EVALUATION
"Anesthesia Post Evaluation    Patient: Eva Bloom    Procedure(s) Performed: Procedure(s) (LRB):  [id number] LEFT UPPER EXTREMITY (Left)  REMOVAL-CATHETER-PERITONEAL DIALYSIS (N/A)    Final Anesthesia Type: regional  Patient location during evaluation: PACU  Patient participation: Yes- Able to Participate  Level of consciousness: awake and alert and oriented  Post-procedure vital signs: reviewed and stable  Pain management: adequate  Airway patency: patent  PONV status at discharge: No PONV  Anesthetic complications: no      Cardiovascular status: blood pressure returned to baseline and hemodynamically stable  Respiratory status: unassisted and spontaneous ventilation  Hydration status: euvolemic  Follow-up not needed.        Visit Vitals    BP (!) 173/94    Pulse 83    Temp 36.7 °C (98.1 °F)    Resp 16    Ht 5' 4" (1.626 m)    Wt 64 kg (141 lb)    SpO2 98%    Breastfeeding No    BMI 24.2 kg/m2       Pain/Mimi Score: Pain Assessment Performed: Yes (1/12/2017  4:13 PM)  Presence of Pain: non-verbal indicators absent (1/12/2017  4:13 PM)  Pain Rating Prior to Med Admin: 7 (1/12/2017  4:28 PM)  Mimi Score: 9 (1/12/2017  4:13 PM)      "

## 2017-01-13 NOTE — OP NOTE
DATE OF PROCEDURE: 01/12/2017    PRIMARY SURGEON: FARIDEH Muñoz M.D.    RESIDENT ASSISTING: GUSTAVO Connor M.D. (RES)    PREOPERATIVE DIAGNOSIS: End-stage renal disease on peritoneal dialysis [N18.6, Z99.2]    POSTOPERATIVE DIAGNOSIS: End-stage renal disease on peritoneal dialysis [N18.6, Z99.2]    PROCEDURE PERFORMED: L upper arm 4-7 taper AVG placment    INDICATIONS FOR PROCEDURE: Patient is a 51 y.o. female with end stage renal disease requiring dialysis.  She previously was dialyzing through a PD catheter, but this is no longer functional.  We recommended LUE AVG placement, and she agreed to proceed.    PROCEDURE IN DETAIL:   After an informed consent was obtained the patient was taken to the operating room and placed in the supine position. The Left upper extremity was prepped and draped in standard sterile fashion. A longitudinal skin incision was made just above the AC fossa and dissection carried down to the brachial artery using electrocautery and sharp dissection. The brachial artery was isolated with vessel loops. We then made a second incision in the proximal arm in the axilla area. Dissection was carried down through the subcutaneous tissues using electrocautery and sharp dissection. The left proximal basilic vein was dissected free from the surrounding tissue and isolated with vessel loops. A counter incision was made in the mid upper left arm for tunneling. The tract was then created with a Fairview tunneler and the 4-7 tapered PTFE graft was pulled through the tunnel. Attention was turned to the brachial artery. The artery was controlled with vessel loops and a longitudinal arteriotomy was performed with an 11 blade scalpel. The artery was forward and backward bled and flushed with heparinized saline. The patient then received systemic IV heparin for anticoagulation. A  4mm end of PTFE graft was then sewn in an end-to-side fashion to the brachial artery with 6-0 Gortex. The vessel loops were released  and the graft was clamped at the site of the counter incision and then flushed with heparinized saline and the suture line was checked for appropriate hemostasis. Attention was then turned toward the venous anastomosis. The distal, 7mm, end of the graft was sewn end-to-side to the left brachial vein with 6-0 Gortex in continuous fashion. Prior to completion of the anastomosis, the vessel loops were loosened and the vein/graft were allowed to fill with blood to remove all air and debris and then tied down. The anastomosis was inspected for hemostasis and a repair stitch was required at the toe of the venous anastomosis. The arterial and venous outflow were evaluated with an intraoperative doppler. A thrill was noted in the graft and a dopplerable distal radial pulse. Hemostasis of the surgical wounds was achieved with a combination of thrombin soaked gel foam and Surgicel and the incisions were then closed in two layers, subcutaneous tissue with 3-0 Vicryl interrupted and skin with 4-0 Monocryl.  The incisions were then covered with steri strips, gauze, and tegaderm.    ESTIMATED BLOOD LOSS: 30 cc    COMPLICATIONS: None    SPECIMEN(s): none     IMPLANT(s): 4-7 taper AVG    DISPOSITION: PACU, hemodynamically stable.    CONDITION: Good.    ATTESTATION: Dr. Muñoz was present and scrubbed for the entire procedure.

## 2017-01-13 NOTE — PLAN OF CARE
Problem: Patient Care Overview  Goal: Plan of Care Review  Outcome: Outcome(s) achieved Date Met:  01/12/17  Discharge instructions given and explained to patient and family with verbalization of understanding all instructions. Prescription given and explained next time and doses of each medication. Patients v/s stable, denies n/v and tolerating po, rates pain level tolerable, IV removed, and family at bedside for patient discharge home. Anesthesia and surgical consent in pt's chart at time of discharge.

## 2017-01-13 NOTE — ANESTHESIA RELEASE NOTE
"Anesthesia Release from PACU Note    Patient: Eva Bloom    Procedure(s) Performed: Procedure(s) (LRB):  [id number] LEFT UPPER EXTREMITY (Left)  REMOVAL-CATHETER-PERITONEAL DIALYSIS (N/A)    Anesthesia type: regional    Post pain: Adequate analgesia    Post assessment: no apparent anesthetic complications and tolerated procedure well    Last Vitals:   Visit Vitals    BP (!) 173/94    Pulse 83    Temp 36.7 °C (98.1 °F)    Resp 16    Ht 5' 4" (1.626 m)    Wt 64 kg (141 lb)    SpO2 98%    Breastfeeding No    BMI 24.2 kg/m2       Post vital signs: stable    Level of consciousness: awake and alert     Nausea/Vomiting: no nausea/no vomiting    Complications: none    Airway Patency: patent    Respiratory: unassisted, spontaneous ventilation    Cardiovascular: stable and blood pressure at baseline    Hydration: euvolemic  "

## 2017-01-16 ENCOUNTER — TELEPHONE (OUTPATIENT)
Dept: PHARMACY | Facility: CLINIC | Age: 52
End: 2017-01-16

## 2017-01-16 RX ORDER — FEBUXOSTAT 40 MG/1
TABLET ORAL
Qty: 30 TABLET | Refills: 4 | Status: SHIPPED | OUTPATIENT
Start: 2017-01-16 | End: 2017-01-27 | Stop reason: SDUPTHER

## 2017-01-16 NOTE — TELEPHONE ENCOUNTER
Patient notified that [employer] will need a copy of her out of pocket medical expenses in order to move forward with her application. As of right now, patient has a $0 co-pay for the medication, but wants to move forward with the assistance. She will try and find her medical expenses, and call me back.     [employer] co-pay card info:  BIN: [id number]  PCN: [id number]  GRP: [id number]  ID: [id number]

## 2017-01-18 ENCOUNTER — TELEPHONE (OUTPATIENT)
Dept: ADMINISTRATIVE | Facility: OTHER | Age: 52
End: 2017-01-18

## 2017-01-18 NOTE — TELEPHONE ENCOUNTER
spoke with patient.  MRI and Ct scan has been scheduled on 1/23  Labs and RTC appointments scheduled on 1/27  Patient confirmed and reminder mailed.

## 2017-01-18 NOTE — TELEPHONE ENCOUNTER
----- Message from Liset Ngo NP sent at 1/11/2017 12:59 PM CST -----  Schedule CT chest and MRI abdomen ASAP. Then schedule pt to come back with labs (CBC,CMP) and to see Dr. Rivera to review imaging.

## 2017-01-20 ENCOUNTER — TELEPHONE (OUTPATIENT)
Dept: PHARMACY | Facility: CLINIC | Age: 52
End: 2017-01-20

## 2017-01-20 DIAGNOSIS — N18.6 ESRD (END STAGE RENAL DISEASE) ON DIALYSIS: Primary | ICD-10-CM

## 2017-01-20 DIAGNOSIS — Z99.2 ESRD (END STAGE RENAL DISEASE) ON DIALYSIS: Primary | ICD-10-CM

## 2017-01-23 ENCOUNTER — HOSPITAL ENCOUNTER (OUTPATIENT)
Dept: RADIOLOGY | Facility: HOSPITAL | Age: 52
Discharge: HOME | End: 2017-01-23
Attending: NURSE PRACTITIONER
Payer: COMMERCIAL

## 2017-01-23 DIAGNOSIS — T45.1X5A CHEMOTHERAPY INDUCED NAUSEA AND VOMITING: ICD-10-CM

## 2017-01-23 DIAGNOSIS — Z90.5 S/P NEPHRECTOMY: Chronic | ICD-10-CM

## 2017-01-23 DIAGNOSIS — C64.2 METASTATIC RENAL CELL CARCINOMA, LEFT: ICD-10-CM

## 2017-01-23 DIAGNOSIS — T45.1X5A CHEMOTHERAPY-INDUCED THROMBOCYTOPENIA: ICD-10-CM

## 2017-01-23 DIAGNOSIS — Z51.11 ENCOUNTER FOR CHEMOTHERAPY MANAGEMENT: ICD-10-CM

## 2017-01-23 DIAGNOSIS — N18.6 ESRD ON HEMODIALYSIS: ICD-10-CM

## 2017-01-23 DIAGNOSIS — Z90.721 HISTORY OF RIGHT OOPHORECTOMY: ICD-10-CM

## 2017-01-23 DIAGNOSIS — N18.6 ANEMIA IN ESRD (END-STAGE RENAL DISEASE): ICD-10-CM

## 2017-01-23 DIAGNOSIS — R11.2 CHEMOTHERAPY INDUCED NAUSEA AND VOMITING: ICD-10-CM

## 2017-01-23 DIAGNOSIS — C78.6 MALIGNANT NEOPLASM METASTATIC TO PERITONEUM: ICD-10-CM

## 2017-01-23 DIAGNOSIS — Z99.2 ESRD ON HEMODIALYSIS: ICD-10-CM

## 2017-01-23 DIAGNOSIS — D69.59 CHEMOTHERAPY-INDUCED THROMBOCYTOPENIA: ICD-10-CM

## 2017-01-23 DIAGNOSIS — G89.3 CANCER ASSOCIATED PAIN: ICD-10-CM

## 2017-01-23 DIAGNOSIS — D63.1 ANEMIA IN ESRD (END-STAGE RENAL DISEASE): ICD-10-CM

## 2017-01-23 PROCEDURE — 74181 MRI ABDOMEN W/O CONTRAST: CPT | Mod: 26,,, | Performed by: RADIOLOGY

## 2017-01-23 PROCEDURE — 71250 CT THORAX DX C-: CPT | Mod: TC

## 2017-01-23 PROCEDURE — 74181 MRI ABDOMEN W/O CONTRAST: CPT | Mod: TC

## 2017-01-23 PROCEDURE — 71250 CT THORAX DX C-: CPT | Mod: 26,,, | Performed by: RADIOLOGY

## 2017-01-24 DIAGNOSIS — Z76.82 ORGAN TRANSPLANT CANDIDATE: Primary | ICD-10-CM

## 2017-01-25 DIAGNOSIS — I12.9 HYPERTENSIVE KIDNEY DISEASE WITH CHRONIC KIDNEY DISEASE, STAGE 1-4 OR UNSPECIFIED CHRONIC KIDNEY DISEASE: ICD-10-CM

## 2017-01-25 RX ORDER — LISINOPRIL 40 MG/1
40 TABLET ORAL DAILY
Qty: 30 TABLET | Refills: 1 | Status: SHIPPED | OUTPATIENT
Start: 2017-01-25 | End: 2017-01-27 | Stop reason: SDUPTHER

## 2017-01-25 RX ORDER — NIFEDIPINE 60 MG/1
60 TABLET, EXTENDED RELEASE ORAL DAILY
Qty: 30 TABLET | Refills: 1 | Status: SHIPPED | OUTPATIENT
Start: 2017-01-25 | End: 2017-01-27 | Stop reason: SDUPTHER

## 2017-01-27 ENCOUNTER — LAB VISIT (OUTPATIENT)
Dept: LAB | Facility: HOSPITAL | Age: 52
End: 2017-01-27
Payer: COMMERCIAL

## 2017-01-27 ENCOUNTER — OFFICE VISIT (OUTPATIENT)
Dept: HEMATOLOGY/ONCOLOGY | Facility: CLINIC | Age: 52
End: 2017-01-27
Payer: COMMERCIAL

## 2017-01-27 ENCOUNTER — HOSPITAL ENCOUNTER (OUTPATIENT)
Dept: VASCULAR SURGERY | Facility: CLINIC | Age: 52
Discharge: HOME | End: 2017-01-27
Attending: SURGERY
Payer: COMMERCIAL

## 2017-01-27 ENCOUNTER — OFFICE VISIT (OUTPATIENT)
Dept: VASCULAR SURGERY | Facility: CLINIC | Age: 52
End: 2017-01-27
Payer: COMMERCIAL

## 2017-01-27 VITALS
HEIGHT: 64 IN | TEMPERATURE: 98 F | DIASTOLIC BLOOD PRESSURE: 81 MMHG | SYSTOLIC BLOOD PRESSURE: 149 MMHG | BODY MASS INDEX: 23.9 KG/M2 | WEIGHT: 140 LBS | HEART RATE: 100 BPM

## 2017-01-27 VITALS
HEIGHT: 64 IN | WEIGHT: 140.88 LBS | BODY MASS INDEX: 24.05 KG/M2 | HEART RATE: 104 BPM | DIASTOLIC BLOOD PRESSURE: 84 MMHG | SYSTOLIC BLOOD PRESSURE: 142 MMHG | RESPIRATION RATE: 16 BRPM | TEMPERATURE: 98 F

## 2017-01-27 DIAGNOSIS — Z90.721 HISTORY OF RIGHT OOPHORECTOMY: ICD-10-CM

## 2017-01-27 DIAGNOSIS — N18.6 ANEMIA IN ESRD (END-STAGE RENAL DISEASE): ICD-10-CM

## 2017-01-27 DIAGNOSIS — C64.2 METASTATIC RENAL CELL CARCINOMA, LEFT: Primary | ICD-10-CM

## 2017-01-27 DIAGNOSIS — C64.2 METASTATIC RENAL CELL CARCINOMA, LEFT: ICD-10-CM

## 2017-01-27 DIAGNOSIS — T45.1X5A CHEMOTHERAPY-INDUCED THROMBOCYTOPENIA: ICD-10-CM

## 2017-01-27 DIAGNOSIS — Z51.11 ENCOUNTER FOR CHEMOTHERAPY MANAGEMENT: ICD-10-CM

## 2017-01-27 DIAGNOSIS — C78.6 MALIGNANT NEOPLASM METASTATIC TO PERITONEUM: ICD-10-CM

## 2017-01-27 DIAGNOSIS — R11.2 CHEMOTHERAPY INDUCED NAUSEA AND VOMITING: ICD-10-CM

## 2017-01-27 DIAGNOSIS — Z99.2 ESRD ON HEMODIALYSIS: ICD-10-CM

## 2017-01-27 DIAGNOSIS — Z99.2 ESRD (END STAGE RENAL DISEASE) ON DIALYSIS: ICD-10-CM

## 2017-01-27 DIAGNOSIS — Z90.5 S/P NEPHRECTOMY: Chronic | ICD-10-CM

## 2017-01-27 DIAGNOSIS — Z76.82 ORGAN TRANSPLANT CANDIDATE: ICD-10-CM

## 2017-01-27 DIAGNOSIS — D69.59 CHEMOTHERAPY-INDUCED THROMBOCYTOPENIA: ICD-10-CM

## 2017-01-27 DIAGNOSIS — E04.1 THYROID NODULE: ICD-10-CM

## 2017-01-27 DIAGNOSIS — Z99.2 ESRD (END STAGE RENAL DISEASE) ON DIALYSIS: Primary | ICD-10-CM

## 2017-01-27 DIAGNOSIS — N18.6 ESRD (END STAGE RENAL DISEASE) ON DIALYSIS: ICD-10-CM

## 2017-01-27 DIAGNOSIS — N18.6 ESRD ON HEMODIALYSIS: ICD-10-CM

## 2017-01-27 DIAGNOSIS — N18.6 ESRD (END STAGE RENAL DISEASE) ON DIALYSIS: Primary | ICD-10-CM

## 2017-01-27 DIAGNOSIS — D63.1 ANEMIA IN ESRD (END-STAGE RENAL DISEASE): ICD-10-CM

## 2017-01-27 DIAGNOSIS — I12.9 HYPERTENSIVE KIDNEY DISEASE WITH CHRONIC KIDNEY DISEASE, STAGE 1-4 OR UNSPECIFIED CHRONIC KIDNEY DISEASE: ICD-10-CM

## 2017-01-27 DIAGNOSIS — T45.1X5A CHEMOTHERAPY INDUCED NAUSEA AND VOMITING: ICD-10-CM

## 2017-01-27 DIAGNOSIS — Z90.5 S/P NEPHRECTOMY: ICD-10-CM

## 2017-01-27 LAB
ALBUMIN SERPL BCP-MCNC: 2.8 G/DL
ALP SERPL-CCNC: 114 U/L
ALT SERPL W/O P-5'-P-CCNC: 14 U/L
ANION GAP SERPL CALC-SCNC: 9 MMOL/L
AST SERPL-CCNC: 20 U/L
BILIRUB SERPL-MCNC: 0.5 MG/DL
BUN SERPL-MCNC: 32 MG/DL
CALCIUM SERPL-MCNC: 9.5 MG/DL
CHLORIDE SERPL-SCNC: 102 MMOL/L
CO2 SERPL-SCNC: 29 MMOL/L
CREAT SERPL-MCNC: 7.2 MG/DL
ERYTHROCYTE [DISTWIDTH] IN BLOOD BY AUTOMATED COUNT: 18.9 %
EST. GFR  (AFRICAN AMERICAN): 6.9 ML/MIN/1.73 M^2
EST. GFR  (NON AFRICAN AMERICAN): 6 ML/MIN/1.73 M^2
GLUCOSE SERPL-MCNC: 135 MG/DL
HCT VFR BLD AUTO: 25.3 %
HGB BLD-MCNC: 7.9 G/DL
MCH RBC QN AUTO: 28.4 PG
MCHC RBC AUTO-ENTMCNC: 31.2 %
MCV RBC AUTO: 91 FL
NEUTROPHILS # BLD AUTO: 3.5 K/UL
PLATELET # BLD AUTO: 316 K/UL
PMV BLD AUTO: 9 FL
POTASSIUM SERPL-SCNC: 4.2 MMOL/L
PROT SERPL-MCNC: 7.6 G/DL
RBC # BLD AUTO: 2.78 M/UL
SODIUM SERPL-SCNC: 140 MMOL/L
WBC # BLD AUTO: 7.1 K/UL

## 2017-01-27 PROCEDURE — 86825 HLA X-MATH NON-CYTOTOXIC: CPT | Mod: 91,PO

## 2017-01-27 PROCEDURE — 99999 PR PBB SHADOW E&M-EST. PATIENT-LVL III: CPT | Mod: PBBFAC,,, | Performed by: INTERNAL MEDICINE

## 2017-01-27 PROCEDURE — 99999 PR PBB SHADOW E&M-EST. PATIENT-LVL III: CPT | Mod: PBBFAC,,, | Performed by: SURGERY

## 2017-01-27 PROCEDURE — 93990 DOPPLER FLOW TESTING: CPT | Mod: S$GLB,,, | Performed by: SURGERY

## 2017-01-27 PROCEDURE — 85027 COMPLETE CBC AUTOMATED: CPT

## 2017-01-27 PROCEDURE — 86826 HLA X-MATCH NONCYTOTOXC ADDL: CPT | Mod: 91,PO

## 2017-01-27 PROCEDURE — 99024 POSTOP FOLLOW-UP VISIT: CPT | Mod: S$GLB,,, | Performed by: SURGERY

## 2017-01-27 PROCEDURE — 80053 COMPREHEN METABOLIC PANEL: CPT

## 2017-01-27 PROCEDURE — 1159F MED LIST DOCD IN RCRD: CPT | Mod: S$GLB,,, | Performed by: INTERNAL MEDICINE

## 2017-01-27 PROCEDURE — 99214 OFFICE O/P EST MOD 30 MIN: CPT | Mod: S$GLB,,, | Performed by: INTERNAL MEDICINE

## 2017-01-27 PROCEDURE — 36415 COLL VENOUS BLD VENIPUNCTURE: CPT

## 2017-01-27 PROCEDURE — 86825 HLA X-MATH NON-CYTOTOXIC: CPT | Mod: PO

## 2017-01-27 PROCEDURE — 86826 HLA X-MATCH NONCYTOTOXC ADDL: CPT | Mod: PO

## 2017-01-27 RX ORDER — ONDANSETRON 8 MG/1
8 TABLET, ORALLY DISINTEGRATING ORAL EVERY 12 HOURS PRN
Qty: 30 TABLET | Refills: 1 | Status: SHIPPED | OUTPATIENT
Start: 2017-01-27 | End: 2017-06-14 | Stop reason: SDUPTHER

## 2017-01-27 RX ORDER — NIFEDIPINE 60 MG/1
60 TABLET, EXTENDED RELEASE ORAL DAILY
Qty: 30 TABLET | Refills: 1 | Status: SHIPPED | OUTPATIENT
Start: 2017-01-27 | End: 2017-03-16 | Stop reason: SDUPTHER

## 2017-01-27 RX ORDER — LISINOPRIL 40 MG/1
40 TABLET ORAL DAILY
Qty: 30 TABLET | Refills: 1 | Status: SHIPPED | OUTPATIENT
Start: 2017-01-27 | End: 2017-03-31 | Stop reason: ALTCHOICE

## 2017-01-27 RX ORDER — FEBUXOSTAT 40 MG/1
40 TABLET, FILM COATED ORAL DAILY
Qty: 30 TABLET | Refills: 4 | Status: SHIPPED | OUTPATIENT
Start: 2017-01-27 | End: 2021-01-27

## 2017-01-27 NOTE — PROGRESS NOTES
"Subjective:       Patient ID: Eva Bloom is a 51 y.o. female.    Chief Complaint: renal cell carcinoma and Results (labs/scans)    HPI   51 year old female to clinic for follow up and to review labs/restaging scans for recurrent metastatic renal cell carcinoma. Pt started on cabozantinib since December 17 but holding due to thrombocytopenia.  Since starting medication, pt reports an increase in fatigue, intermittent nausea, decreased appetite and altered taste sensation. Nausea resolved with stopping medication. Since her last visit, she has had her PD catheter removed and is having an AV fistula placed. Pt doing dialysis three times weekly.    Today, she denies any mouth sores, nausea, vomiting, diarrhea, constipation,weight loss, chest pain, shortness of breath, leg swelling, headache, dizziness, or mood changes.    She is accompanied alone to clinic.    Oncologic History (From Chart and Patient):  Eva Bloom is a 51 y.o. female with ESRD on HD who was found to have two L renal masses. She underwent L lap nephrectomy on 1/12/16. 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 Margins were negative.  Shehealed from surgery well, but then developed a large ovarian mass.  This was removed by gyn along with multiple sites of metastatic disease in the pelvis.  Path was c/w recurrence of RCC.     11/20/16 Pelvic ultrasound reveals "Large heterogeneous cystic and solid mass which appears to arise from the right ovary with worrisome imaging features for malignancy.  Differential diagnosis includes malignant tumors such as serous or mucinous cystadenocarcinoma.  It is important to note that the patient is at risk for ovarian torsion due to the size of the mass.Multiple uterine fibroids are identified with the largest in the uterine fundus."    11/22/16 pathology reveals "FINAL PATHOLOGIC " "DIAGNOSIS-RIGHT OVARY AND FALLOPIAN TUBE, RIGHT SALPINGO-OOPHORECTOMY:  -Positive for malignancy, high grade carcinoma morphologically and immunohistochemically consistent with metastasis from the patient's known renal cell carcinoma"    1/23/17 MRI abdomen reveals "Status post left nephrectomy, the left nephrectomy bed appears normal.There is a small liver lesion and a small splenic lesion, they cannot be fully characterized but they are unchanged from 5/27/16 suggestive of benign lesions.Multiple right kidney cysts.Cholelithiasis.Soft tissue oval density noted within the mesentery adjacent to the aorta right iliac bifurcation, nonspecific, could represent an enlarged lymph node, it is unchanged from prior studies"    1/23/17- CT Chest reveals "Stable pulmonary nodules, likely benign. No new pulmonary parenchymal abnormalities are identified. Enlarging nodular foci in partially visualized left upper abdomen. Peritoneal implants not excluded. Multiple thyroid nodules, measuring up to 1.6 cm."    Review of Systems   Constitutional: Positive for appetite change and fatigue. Negative for activity change, chills and fever.   HENT: Negative for congestion, hearing loss, mouth sores, sore throat, tinnitus and voice change.    Eyes: Negative for pain and visual disturbance.   Respiratory: Negative for cough, shortness of breath and wheezing.    Cardiovascular: Negative for chest pain, palpitations and leg swelling.   Gastrointestinal: Negative for abdominal pain, constipation, diarrhea, nausea and vomiting.   Endocrine: Negative for cold intolerance and heat intolerance.   Genitourinary: Negative for difficulty urinating, dyspareunia, dysuria, frequency, menstrual problem, urgency, vaginal bleeding, vaginal discharge and vaginal pain.   Musculoskeletal: Negative for arthralgias and myalgias.   Skin: Negative for color change, rash and wound.   Allergic/Immunologic: Negative for environmental allergies and food allergies. "   Neurological: Negative for weakness, numbness and headaches.   Hematological: Negative for adenopathy. Does not bruise/bleed easily.   Psychiatric/Behavioral: Negative for agitation, confusion, hallucinations and sleep disturbance. The patient is not nervous/anxious.    All other systems reviewed and are negative.      Allergies:  Review of patient's allergies indicates:   Allergen Reactions    Allopurinol      Other reaction(s): abnormal transaminases       Medications:  Current Outpatient Prescriptions   Medication Sig Dispense Refill    acetaminophen (TYLENOL) 500 MG tablet Take 500 mg by mouth every 6 (six) hours as needed for Pain.      aluminum-magnesium hydroxide-simethicone (MAALOX) 200-200-20 mg/5 mL Susp Take 30 mLs by mouth every 6 (six) hours as needed.       cabozantinib 60 mg Tab Take 60 mg by mouth once daily. 30 tablet 5    calcitRIOL (ROCALTROL) 0.25 MCG Cap TAKE ONE CAPSULE BY MOUTH ONCE DAILY 30 capsule 11    lisinopril (PRINIVIL,ZESTRIL) 40 MG tablet Take 1 tablet (40 mg total) by mouth once daily. 30 tablet 1    multivitamin-Ca-iron-minerals (ONE-A-DAY WOMENS FORMULA) 27-0.4 mg Tab Take by mouth. 1 Tablet Oral Every day      nifedipine (PROCARDIA-XL) 60 MG (OSM) 24 hr tablet Take 1 tablet (60 mg total) by mouth once daily. 30 tablet 1    ondansetron (ZOFRAN-ODT) 8 MG TbDL Take 1 tablet (8 mg total) by mouth every 12 (twelve) hours as needed. 30 tablet 1    oxycodone-acetaminophen (PERCOCET) 5-325 mg per tablet Take 1 tablet by mouth every 4 (four) hours as needed. 60 tablet 0    polyethylene glycol (GLYCOLAX) 17 gram PwPk Take 17 g by mouth 2 (two) times daily. (Patient taking differently: Take 17 g by mouth once daily. )  0    psyllium husk-calcium (METAMUCIL PLUS CALCIUM) 1-60 gram-mg Cap Take by mouth. 2 Capsule Oral Every morning      ULORIC 40 mg Tab TAKE ONE TABLET BY MOUTH ONCE DAILY 30 tablet 4     No current facility-administered medications for this visit.         PMH:  Past Medical History   Diagnosis Date    Anemia     Cancer     CMV (cytomegalovirus) antibody positive     Essential hypertension 9/23/2015    H/O herpes simplex type 2 infection     Herpes simplex type 1 antibody positive     History of kidney cancer      s/p left nephrectomy 1/2016    Hyperparathyroidism, unspecified     Hyperuricemia without signs of inflammatory arthritis and tophaceous disease     Kidney stones     LGSIL (low grade squamous intraepithelial dysplasia)     Prediabetes     Proteinuria     Thyroid nodule     Urate nephropathy        PSH:  Past Surgical History   Procedure Laterality Date    Tonsillectomy      Breast surgery       LUMPECTOMY    Tubal ligation      Colonoscopy N/A 11/12/2015    Nephrectomy-laparoscopic Left 01/12/2016    Peritoneal catheter insertion      Salpingoophorectomy Right 2016     KJB---REJIINCI       FamHx:  Family History   Problem Relation Age of Onset    Hypertension Mother     Diabetes Father     Kidney disease Father     Diabetes Maternal Grandfather     No Known Problems Sister     No Known Problems Brother     Heart disease Sister     No Known Problems Sister     No Known Problems Brother     Breast cancer Neg Hx     Colon cancer Neg Hx     Cancer Neg Hx     Stroke Neg Hx        SocHx:  Social History     Social History    Marital status:      Spouse name: N/A    Number of children: 2    Years of education: N/A     Occupational History    [employer]     Social History Main Topics    Smoking status: Never Smoker    Smokeless tobacco: Never Used    Alcohol use No      Comment: . 2 children. works at [employer].    Drug use: No    Sexual activity: Yes     Partners: Male     Other Topics Concern    Not on file     Social History Narrative         Objective:      Physical Exam   Constitutional: She is oriented to person, place, and time. She appears well-developed and well-nourished. No  distress.   HENT:   Head: Normocephalic and atraumatic.   Nose: Nose normal.   Mouth/Throat: Oropharynx is clear and moist. No oropharyngeal exudate.   Eyes: Conjunctivae and EOM are normal. Pupils are equal, round, and reactive to light. Right eye exhibits no discharge. Left eye exhibits no discharge.   Neck: Normal range of motion. Neck supple. No tracheal deviation present.   Cardiovascular: Normal rate, regular rhythm and normal heart sounds.  Exam reveals no gallop and no friction rub.    No murmur heard.  Pulmonary/Chest: Effort normal and breath sounds normal. No respiratory distress. She has no wheezes. She has no rales.   Abdominal: Soft. Bowel sounds are normal. She exhibits no distension. There is no tenderness. There is no rebound and no guarding.   Genitourinary: No breast tenderness, discharge or bleeding.   Musculoskeletal: Normal range of motion. She exhibits no edema or tenderness.   Neurological: She is alert and oriented to person, place, and time. She has normal reflexes. No cranial nerve deficit. Coordination normal.   Skin: Skin is warm and dry. No rash noted. She is not diaphoretic. No erythema. No pallor.   Psychiatric: She has a normal mood and affect. Her behavior is normal. Thought content normal.   Nursing note and vitals reviewed.      LABS:  WBC   Date Value Ref Range Status   01/27/2017 7.10 3.90 - 12.70 K/uL Final     Hemoglobin   Date Value Ref Range Status   01/27/2017 7.9 (L) 12.0 - 16.0 g/dL Final     Hematocrit   Date Value Ref Range Status   01/27/2017 25.3 (L) 37.0 - 48.5 % Final     Platelets   Date Value Ref Range Status   01/27/2017 316 150 - 350 K/uL Final       Chemistry        Component Value Date/Time     01/27/2017 0832    K 4.2 01/27/2017 0832     01/27/2017 0832    CO2 29 01/27/2017 0832    BUN 32 (H) 01/27/2017 0832    CREATININE 7.2 (H) 01/27/2017 0832     (H) 01/27/2017 0832        Component Value Date/Time    CALCIUM 9.5 01/27/2017 0832     "ALKPHOS 114 01/27/2017 0832    AST 20 01/27/2017 0832    ALT 14 01/27/2017 0832    BILITOT 0.5 01/27/2017 0832        1/23/17 MRI abdomen reveals "Status post left nephrectomy, the left nephrectomy bed appears normal.There is a small liver lesion and a small splenic lesion, they cannot be fully characterized but they are unchanged from 5/27/16 suggestive of benign lesions.Multiple right kidney cysts.Cholelithiasis.Soft tissue oval density noted within the mesentery adjacent to the aorta right iliac bifurcation, nonspecific, could represent an enlarged lymph node, it is unchanged from prior studies"    1/23/17- CT Chest reveals "Stable pulmonary nodules, likely benign. No new pulmonary parenchymal abnormalities are identified. Enlarging nodular foci in partially visualized left upper abdomen. Peritoneal implants not excluded. Multiple thyroid nodules, measuring up to 1.6 cm."    Assessment:       1. Metastatic renal cell carcinoma, left    2. Malignant neoplasm metastatic to peritoneum    3. History of right oophorectomy    4. S/p nephrectomy left    5. Encounter for chemotherapy management    6. Chemotherapy-induced thrombocytopenia    7. Anemia in ESRD (end-stage renal disease)    8. ESRD on hemodialysis    9. Hypertensive kidney disease with chronic kidney disease, stage 1-4 or unspecified chronic kidney disease    10. Chemotherapy induced nausea and vomiting    11. Thyroid nodule        Plan:     1,2,3,4,5,6- Metastatic Renal Cell Carcinoma:    - It is unlikely that surgery removed all sites of metastatic disease. Recent restaging scans show no reoccurrence.  Given that this is c/w her previous papillary disease, pt started on oral cabozantinib 60mg daily (for dual VEGF and MET inhibition), especially given recent data that shows cabo is far superior to student in the first line setting.  If pt progresses on this regimen, will switch to nivolumab.     Platelet count normal today. Restart cabozantinib. Will get " weekly CBC either at dialysis or at [facility] to monitor platelet count. Have pt RTC in 3 weeks with labs (CBC,CMP) and to see me.    7,8- Mild, will monitor.    9-Stable on meds.    10- Zofran refill e-scribed. If zofran not working, encouraged to call office.    11- Had thyroid US and biopsy last year, which was negative. Will monitor for now.      Patient was also seen and examined by Dr. Rivera. Patient is in agreement with the proposed treatment plan. All questions were answered to the patient's satisfaction. Pt knows to call clinic if anything is needed before the next clinic visit.    TONA Telles-RODRÍGUEZ  Hematology and Medical Oncology        I have reviewed the notes, assessments, and/or procedures performed by the nurse practitioner, as above.  I have personally interviewed the patient at the beside, and rounded with the nurse practitioner. I formulated the plan of care.  I concur with her documentation of Eva Bloom.  More than 25 mins were spent during this encounter, greater than 50% was spent in direct counseling and/or coordination of care.     Ben Rivera M.D., M.S.  Hematology/Oncology Attending  [facility]

## 2017-01-27 NOTE — Clinical Note
Needs weekly CBC either at dialysis ([facility] or at [facility]).  Can we see if dialysis will draw it and fax it to us?  RTC in 3 weeks with labs (CBC,CMP) and to see me.

## 2017-01-27 NOTE — PROGRESS NOTES
See my prior note; review of systems, family history and social history are   unchanged.    A 51-year-old female status post,  1. Left upper arm 4-7 tapered AV graft placement 01/12/2017.    Of note, she was found to have an exceptionally thin and friable 4 mm brachial   vein, which was used as the outflow.    She now returns.  She is without complaint.    PHYSICAL EXAMINATION:  VITAL SIGNS:  See nursing note.  EXTREMITIES:  Left arm shows 2+ radial pulse.  The graft has a reasonably good   thrill but with some underlying pulsatility.  All incisions are well healing.    IMAGING:  Duplex of the graft shows a significant venous outflow stenosis with   velocity of 699.  Notably, this is distal to the venous anastomosis.  Flow   volume is robust; however, at 1.7 liters.    ASSESSMENT:  Two weeks status post left upper arm loop 4-7 mm graft.  There   appears to be high-grade stenosis distal to the venous anastomosis.  This will   ultimately need intervention.  However, because of the postoperative status only   being two weeks out, and the exceptionally fragility of her brachial vein, I   would like to wait at least four more weeks.  However, I do believe the graft   can begin to be used.    RECOMMENDATIONS:  May begin to access the graft in one week's time.  Follow up   with me in four weeks with repeat duplex with plans on intervention after.      TRAVIS/SIMONE  dd: 01/27/2017 11:03:37 (CST)  td: 01/28/2017 05:56:16 (CST)  Doc ID   #[id number]  Job ID #[id number]    CC:

## 2017-01-27 NOTE — MR AVS SNAPSHOT
[facility]  [address]  [facility] [address]  Phone: [phone]  Fax: [phone]                  Eva Bloom   2017 10:15 AM   Office Visit    Description:  Female : 1965   Provider:  FARIDEH Muñoz III, MD   [facility]           Reason for Visit     Post-op Evaluation           Diagnoses this Visit        Comments    ESRD (end stage renal disease) on dialysis    -  Primary            To Do List           Future Appointments        Provider Department Dept Phone    2017 4:00 PM Williams Hart MD [facility] [phone]    3/29/2017 4:00 PM Williams Hart MD [facility] [phone]    6/15/2017 9:40 AM Alison Rivera MD [facility] [phone]      Goals (5 Years of Data)              9/8/16    10/9/15    HEMOGLOBIN A1C < 7.0   5.1  6.0    Related Problems    Prediabetes      Follow-Up and Disposition     Return in about 4 weeks (around 2017).      [facility]     [facility] On Call Nurse Care Line -  Assistance  Registered nurses in the [facility] On Call Center provide clinical advisement, health education, appointment booking, and other advisory services.  Call for this free service at [phone].             Medications           Message regarding Medications     Verify the changes and/or additions to your medication regime listed below are the same as discussed with your clinician today.  If any of these changes or additions are incorrect, please notify your healthcare provider.             Verify that the below list of medications is an accurate representation of the medications you are currently taking.  If none reported, the list may be blank. If incorrect, please contact your healthcare provider. Carry this list with you in case of emergency.           Current Medications     acetaminophen (TYLENOL) 500 MG tablet Take 500 mg by mouth every 6 (six) hours as needed  "for Pain.    aluminum-magnesium hydroxide-simethicone (MAALOX) 200-200-20 mg/5 mL Susp Take 30 mLs by mouth every 6 (six) hours as needed.     cabozantinib 60 mg Tab Take 60 mg by mouth once daily.    calcitRIOL (ROCALTROL) 0.25 MCG Cap TAKE ONE CAPSULE BY MOUTH ONCE DAILY    febuxostat (ULORIC) 40 mg Tab Take 1 tablet (40 mg total) by mouth once daily.    lisinopril (PRINIVIL,ZESTRIL) 40 MG tablet Take 1 tablet (40 mg total) by mouth once daily.    multivitamin-Ca-iron-minerals (ONE-A-DAY WOMENS FORMULA) 27-0.4 mg Tab Take by mouth. 1 Tablet Oral Every day    nifedipine (PROCARDIA-XL) 60 MG (OSM) 24 hr tablet Take 1 tablet (60 mg total) by mouth once daily.    ondansetron (ZOFRAN-ODT) 8 MG TbDL Take 1 tablet (8 mg total) by mouth every 12 (twelve) hours as needed.    oxycodone-acetaminophen (PERCOCET) 5-325 mg per tablet Take 1 tablet by mouth every 4 (four) hours as needed.    polyethylene glycol (GLYCOLAX) 17 gram PwPk Take 17 g by mouth 2 (two) times daily.    psyllium husk-calcium (METAMUCIL PLUS CALCIUM) 1-60 gram-mg Cap Take by mouth. 2 Capsule Oral Every morning           Clinical Reference Information           Vital Signs - Last Recorded  Most recent update: 1/27/2017 10:23 AM by Morelia Esteban MA    BP Pulse Temp Ht Wt BMI    (!) 149/81 (BP Location: Right arm, Patient Position: Sitting, BP Method: Automatic) 100 97.6 °F (36.4 °C) (Oral) 5' 4" (1.626 m) 63.5 kg (140 lb) 24.03 kg/m2      Blood Pressure          Most Recent Value    BP  (!)  149/81      Allergies as of 1/27/2017     Allopurinol      Immunizations Administered on Date of Encounter - 1/27/2017     None      Maintenance Dialysis History     Start End Type Comments Center    1/15/2016  [facility]            Current D[facility] Center Information     [facility] [address] Phone #:  [phone]    Contact:  N/A [place]ISA [place]ARTE  [address] Fax #:  [phone]            Transplant Information        Txp Date Organ " Coordinator Care Team     Kidney Kinsey Carballo, RN Referring Physician:  Williams Hart MD   Current Nephrologist:  Williams Hart MD

## 2017-02-01 ENCOUNTER — TELEPHONE (OUTPATIENT)
Dept: HEMATOLOGY/ONCOLOGY | Facility: CLINIC | Age: 52
End: 2017-02-01

## 2017-02-01 LAB
B CELL RESULTS - XM ALLO: NEGATIVE
B CELL RESULTS - XM ALLO: NEGATIVE
B MCS AVERAGE - XM ALLO: -37
B MCS AVERAGE - XM ALLO: 2
DONOR MRN: NORMAL
DONOR MRN: NORMAL
FXMAL TESTING DATE: NORMAL
FXMAL TESTING DATE: NORMAL
HLA FLOW CROSSMATCH (ALLO) INTERPRETATION: NORMAL
SERUM COLLECTION DT - XM ALLO: NORMAL
SERUM COLLECTION DT - XM ALLO: NORMAL
T CELL RESULTS - XM ALLO: NEGATIVE
T CELL RESULTS - XM ALLO: NEGATIVE
T MCS AVERAGE - XM ALLO: -5.5
T MCS AVERAGE - XM ALLO: 9

## 2017-02-01 NOTE — TELEPHONE ENCOUNTER
----- Message from Aakash Sorto sent at 2/1/2017 11:59 AM CST -----  Contact: self  Pt would like to know if medical forms were received from job.  Contact number [phone]

## 2017-02-01 NOTE — TELEPHONE ENCOUNTER
Called and informed pt we had not received the forms as of yet, asked her to have them resent to [phone] and to my attention.  Will call her when received.

## 2017-02-02 ENCOUNTER — TELEPHONE (OUTPATIENT)
Dept: HEMATOLOGY/ONCOLOGY | Facility: CLINIC | Age: 52
End: 2017-02-02

## 2017-02-02 NOTE — TELEPHONE ENCOUNTER
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    Contact number [phone]

## 2017-02-02 NOTE — TELEPHONE ENCOUNTER
Called patient and informed her that I received her paper work and have sent it to medical records to be completed.

## 2017-02-03 ENCOUNTER — LAB VISIT (OUTPATIENT)
Dept: LAB | Facility: HOSPITAL | Age: 52
End: 2017-02-03
Attending: INTERNAL MEDICINE
Payer: COMMERCIAL

## 2017-02-03 DIAGNOSIS — I12.9 HYPERTENSIVE KIDNEY DISEASE WITH CHRONIC KIDNEY DISEASE, STAGE 1-4 OR UNSPECIFIED CHRONIC KIDNEY DISEASE: ICD-10-CM

## 2017-02-03 DIAGNOSIS — C64.2 METASTATIC RENAL CELL CARCINOMA, LEFT: ICD-10-CM

## 2017-02-03 DIAGNOSIS — Z51.11 ENCOUNTER FOR CHEMOTHERAPY MANAGEMENT: ICD-10-CM

## 2017-02-03 DIAGNOSIS — Z99.2 ESRD (END STAGE RENAL DISEASE) ON DIALYSIS: Primary | ICD-10-CM

## 2017-02-03 DIAGNOSIS — T45.1X5A CHEMOTHERAPY-INDUCED THROMBOCYTOPENIA: ICD-10-CM

## 2017-02-03 DIAGNOSIS — Z90.721 HISTORY OF RIGHT OOPHORECTOMY: ICD-10-CM

## 2017-02-03 DIAGNOSIS — N18.6 ESRD (END STAGE RENAL DISEASE) ON DIALYSIS: Primary | ICD-10-CM

## 2017-02-03 DIAGNOSIS — T45.1X5A CHEMOTHERAPY INDUCED NAUSEA AND VOMITING: ICD-10-CM

## 2017-02-03 DIAGNOSIS — D63.1 ANEMIA IN ESRD (END-STAGE RENAL DISEASE): ICD-10-CM

## 2017-02-03 DIAGNOSIS — N18.6 ESRD ON HEMODIALYSIS: ICD-10-CM

## 2017-02-03 DIAGNOSIS — C78.6 MALIGNANT NEOPLASM METASTATIC TO PERITONEUM: ICD-10-CM

## 2017-02-03 DIAGNOSIS — N18.6 ANEMIA IN ESRD (END-STAGE RENAL DISEASE): ICD-10-CM

## 2017-02-03 DIAGNOSIS — Z90.5 S/P NEPHRECTOMY: ICD-10-CM

## 2017-02-03 DIAGNOSIS — Z99.2 ESRD ON HEMODIALYSIS: ICD-10-CM

## 2017-02-03 DIAGNOSIS — D69.59 CHEMOTHERAPY-INDUCED THROMBOCYTOPENIA: ICD-10-CM

## 2017-02-03 DIAGNOSIS — R11.2 CHEMOTHERAPY INDUCED NAUSEA AND VOMITING: ICD-10-CM

## 2017-02-03 LAB
ERYTHROCYTE [DISTWIDTH] IN BLOOD BY AUTOMATED COUNT: 16.4 %
HCT VFR BLD AUTO: 27.8 %
HGB BLD-MCNC: 8.5 G/DL
MCH RBC QN AUTO: 27.6 PG
MCHC RBC AUTO-ENTMCNC: 30.6 %
MCV RBC AUTO: 90 FL
NEUTROPHILS # BLD AUTO: 2.3 K/UL
PLATELET # BLD AUTO: 368 K/UL
PMV BLD AUTO: 8.8 FL
RBC # BLD AUTO: 3.08 M/UL
WBC # BLD AUTO: 5.51 K/UL

## 2017-02-03 PROCEDURE — 36415 COLL VENOUS BLD VENIPUNCTURE: CPT | Mod: PO

## 2017-02-03 PROCEDURE — 85027 COMPLETE CBC AUTOMATED: CPT | Mod: PO

## 2017-02-06 ENCOUNTER — TELEPHONE (OUTPATIENT)
Dept: HEMATOLOGY/ONCOLOGY | Facility: CLINIC | Age: 52
End: 2017-02-06

## 2017-02-06 NOTE — TELEPHONE ENCOUNTER
Returned call, no answer and unable to leave voicemail was as if a fax line was picking up. Will continue to follow up.

## 2017-02-06 NOTE — TELEPHONE ENCOUNTER
----- Message from Shamika Bhatti sent at 2/6/2017  9:19 AM CST -----  Patient would like the nurse to give her a call back about her paperwork. Says she have a few questions. She can be reached at [phone].

## 2017-02-08 ENCOUNTER — TELEPHONE (OUTPATIENT)
Dept: HEMATOLOGY/ONCOLOGY | Facility: CLINIC | Age: 52
End: 2017-02-08

## 2017-02-08 NOTE — TELEPHONE ENCOUNTER
Informed patient paperwork was completed am just waiting for physician to sign and complete one small portion so we can send them in.

## 2017-02-08 NOTE — TELEPHONE ENCOUNTER
----- Message from Aakash Sorto sent at 2/8/2017  8:56 AM CST -----  Contact: self  Pt is calling to check the status of short term disability papers sent back to employer.  Contact number [phone]

## 2017-02-10 ENCOUNTER — LAB VISIT (OUTPATIENT)
Dept: LAB | Facility: HOSPITAL | Age: 52
End: 2017-02-10
Attending: NURSE PRACTITIONER
Payer: COMMERCIAL

## 2017-02-10 DIAGNOSIS — C64.2 MALIGNANT NEOPLASM OF LEFT KIDNEY: ICD-10-CM

## 2017-02-10 LAB
BASOPHILS # BLD AUTO: 0.07 K/UL
BASOPHILS NFR BLD: 1.5 %
DIFFERENTIAL METHOD: ABNORMAL
EOSINOPHIL # BLD AUTO: 0.3 K/UL
EOSINOPHIL NFR BLD: 6.9 %
ERYTHROCYTE [DISTWIDTH] IN BLOOD BY AUTOMATED COUNT: 16.4 %
HCT VFR BLD AUTO: 26.4 %
HGB BLD-MCNC: 8.2 G/DL
HYPOCHROMIA BLD QL SMEAR: ABNORMAL
LYMPHOCYTES # BLD AUTO: 2.4 K/UL
LYMPHOCYTES NFR BLD: 49.1 %
MCH RBC QN AUTO: 28.2 PG
MCHC RBC AUTO-ENTMCNC: 31.1 %
MCV RBC AUTO: 91 FL
MONOCYTES # BLD AUTO: 0.3 K/UL
MONOCYTES NFR BLD: 6.4 %
NEUTROPHILS # BLD AUTO: 1.7 K/UL
NEUTROPHILS NFR BLD: 36.1 %
PLATELET # BLD AUTO: 226 K/UL
PLATELET BLD QL SMEAR: ABNORMAL
PMV BLD AUTO: 9.2 FL
RBC # BLD AUTO: 2.91 M/UL
WBC # BLD AUTO: 4.81 K/UL

## 2017-02-10 PROCEDURE — 85025 COMPLETE CBC W/AUTO DIFF WBC: CPT | Mod: PO

## 2017-02-10 PROCEDURE — 36415 COLL VENOUS BLD VENIPUNCTURE: CPT | Mod: PO

## 2017-02-17 ENCOUNTER — OFFICE VISIT (OUTPATIENT)
Dept: VASCULAR SURGERY | Facility: CLINIC | Age: 52
End: 2017-02-17
Payer: COMMERCIAL

## 2017-02-17 ENCOUNTER — LAB VISIT (OUTPATIENT)
Dept: LAB | Facility: HOSPITAL | Age: 52
End: 2017-02-17
Attending: NURSE PRACTITIONER
Payer: COMMERCIAL

## 2017-02-17 ENCOUNTER — OFFICE VISIT (OUTPATIENT)
Dept: HEMATOLOGY/ONCOLOGY | Facility: CLINIC | Age: 52
End: 2017-02-17
Payer: COMMERCIAL

## 2017-02-17 ENCOUNTER — HOSPITAL ENCOUNTER (OUTPATIENT)
Dept: VASCULAR SURGERY | Facility: CLINIC | Age: 52
Discharge: HOME | End: 2017-02-17
Attending: SURGERY
Payer: COMMERCIAL

## 2017-02-17 VITALS
TEMPERATURE: 96 F | WEIGHT: 144 LBS | DIASTOLIC BLOOD PRESSURE: 93 MMHG | SYSTOLIC BLOOD PRESSURE: 169 MMHG | HEART RATE: 109 BPM | HEIGHT: 64 IN | BODY MASS INDEX: 24.59 KG/M2

## 2017-02-17 VITALS
SYSTOLIC BLOOD PRESSURE: 181 MMHG | RESPIRATION RATE: 17 BRPM | DIASTOLIC BLOOD PRESSURE: 97 MMHG | OXYGEN SATURATION: 99 % | WEIGHT: 143.5 LBS | HEART RATE: 96 BPM | HEIGHT: 64 IN | TEMPERATURE: 98 F | BODY MASS INDEX: 24.5 KG/M2

## 2017-02-17 DIAGNOSIS — N18.6 ANEMIA IN ESRD (END-STAGE RENAL DISEASE): ICD-10-CM

## 2017-02-17 DIAGNOSIS — Z99.2 ESRD (END STAGE RENAL DISEASE) ON DIALYSIS: ICD-10-CM

## 2017-02-17 DIAGNOSIS — I12.9 HYPERTENSIVE KIDNEY DISEASE WITH CHRONIC KIDNEY DISEASE, STAGE 1-4 OR UNSPECIFIED CHRONIC KIDNEY DISEASE: ICD-10-CM

## 2017-02-17 DIAGNOSIS — T82.858D STENOSIS OF ARTERIOVENOUS DIALYSIS FISTULA, SUBSEQUENT ENCOUNTER: ICD-10-CM

## 2017-02-17 DIAGNOSIS — Z90.5 S/P NEPHRECTOMY: ICD-10-CM

## 2017-02-17 DIAGNOSIS — N18.9 ACUTE ON CHRONIC KIDNEY FAILURE: ICD-10-CM

## 2017-02-17 DIAGNOSIS — Z99.2 ESRD ON HEMODIALYSIS: ICD-10-CM

## 2017-02-17 DIAGNOSIS — R11.2 CHEMOTHERAPY INDUCED NAUSEA AND VOMITING: ICD-10-CM

## 2017-02-17 DIAGNOSIS — N18.6 ESRD ON HEMODIALYSIS: ICD-10-CM

## 2017-02-17 DIAGNOSIS — Z51.11 ENCOUNTER FOR CHEMOTHERAPY MANAGEMENT: ICD-10-CM

## 2017-02-17 DIAGNOSIS — T45.1X5A CHEMOTHERAPY-INDUCED THROMBOCYTOPENIA: ICD-10-CM

## 2017-02-17 DIAGNOSIS — D63.1 ANEMIA IN ESRD (END-STAGE RENAL DISEASE): ICD-10-CM

## 2017-02-17 DIAGNOSIS — N18.6 ESRD (END STAGE RENAL DISEASE) ON DIALYSIS: ICD-10-CM

## 2017-02-17 DIAGNOSIS — T85.858A STENOSIS DUE TO ANY DEVICE, IMPLANT OR GRAFT: ICD-10-CM

## 2017-02-17 DIAGNOSIS — E04.1 THYROID NODULE: ICD-10-CM

## 2017-02-17 DIAGNOSIS — T45.1X5A CHEMOTHERAPY INDUCED NAUSEA AND VOMITING: ICD-10-CM

## 2017-02-17 DIAGNOSIS — C78.6 MALIGNANT NEOPLASM METASTATIC TO PERITONEUM: ICD-10-CM

## 2017-02-17 DIAGNOSIS — D69.59 CHEMOTHERAPY-INDUCED THROMBOCYTOPENIA: ICD-10-CM

## 2017-02-17 DIAGNOSIS — C64.2 METASTATIC RENAL CELL CARCINOMA, LEFT: Primary | ICD-10-CM

## 2017-02-17 DIAGNOSIS — N18.4 CKD (CHRONIC KIDNEY DISEASE) STAGE 4, GFR 15-29 ML/MIN: ICD-10-CM

## 2017-02-17 DIAGNOSIS — C64.2 MALIGNANT NEOPLASM OF LEFT KIDNEY: ICD-10-CM

## 2017-02-17 DIAGNOSIS — Z90.721 HISTORY OF RIGHT OOPHORECTOMY: ICD-10-CM

## 2017-02-17 DIAGNOSIS — N17.9 ACUTE ON CHRONIC KIDNEY FAILURE: ICD-10-CM

## 2017-02-17 PROBLEM — T82.858A STENOSIS OF ARTERIOVENOUS DIALYSIS FISTULA: Status: ACTIVE | Noted: 2017-02-17

## 2017-02-17 LAB
ALBUMIN SERPL BCP-MCNC: 2.7 G/DL
ALP SERPL-CCNC: 73 U/L
ALT SERPL W/O P-5'-P-CCNC: 33 U/L
ANION GAP SERPL CALC-SCNC: 9 MMOL/L
AST SERPL-CCNC: 34 U/L
BASOPHILS # BLD AUTO: 0.04 K/UL
BASOPHILS NFR BLD: 0.6 %
BILIRUB SERPL-MCNC: 0.6 MG/DL
BUN SERPL-MCNC: 32 MG/DL
CALCIUM SERPL-MCNC: 8.8 MG/DL
CHLORIDE SERPL-SCNC: 98 MMOL/L
CO2 SERPL-SCNC: 32 MMOL/L
CREAT SERPL-MCNC: 7.9 MG/DL
DIFFERENTIAL METHOD: ABNORMAL
EOSINOPHIL # BLD AUTO: 0.5 K/UL
EOSINOPHIL NFR BLD: 8.2 %
ERYTHROCYTE [DISTWIDTH] IN BLOOD BY AUTOMATED COUNT: 17.1 %
EST. GFR  (AFRICAN AMERICAN): 6.2 ML/MIN/1.73 M^2
EST. GFR  (NON AFRICAN AMERICAN): 5.3 ML/MIN/1.73 M^2
GLUCOSE SERPL-MCNC: 100 MG/DL
HCT VFR BLD AUTO: 24.9 %
HGB BLD-MCNC: 7.7 G/DL
LYMPHOCYTES # BLD AUTO: 3.1 K/UL
LYMPHOCYTES NFR BLD: 50.2 %
MCH RBC QN AUTO: 28.1 PG
MCHC RBC AUTO-ENTMCNC: 30.9 %
MCV RBC AUTO: 91 FL
MONOCYTES # BLD AUTO: 0.2 K/UL
MONOCYTES NFR BLD: 3.2 %
NEUTROPHILS # BLD AUTO: 2.4 K/UL
NEUTROPHILS NFR BLD: 37.6 %
PLATELET # BLD AUTO: 136 K/UL
PMV BLD AUTO: 9.5 FL
POTASSIUM SERPL-SCNC: 4.4 MMOL/L
PROT SERPL-MCNC: 6.7 G/DL
RBC # BLD AUTO: 2.74 M/UL
SODIUM SERPL-SCNC: 139 MMOL/L
WBC # BLD AUTO: 6.25 K/UL

## 2017-02-17 PROCEDURE — 80053 COMPREHEN METABOLIC PANEL: CPT

## 2017-02-17 PROCEDURE — 99214 OFFICE O/P EST MOD 30 MIN: CPT | Mod: S$GLB,,, | Performed by: INTERNAL MEDICINE

## 2017-02-17 PROCEDURE — 93990 DOPPLER FLOW TESTING: CPT | Mod: S$GLB,,, | Performed by: SURGERY

## 2017-02-17 PROCEDURE — 36415 COLL VENOUS BLD VENIPUNCTURE: CPT

## 2017-02-17 PROCEDURE — 99024 POSTOP FOLLOW-UP VISIT: CPT | Mod: S$GLB,,, | Performed by: SURGERY

## 2017-02-17 PROCEDURE — 99999 PR PBB SHADOW E&M-EST. PATIENT-LVL III: CPT | Mod: PBBFAC,,, | Performed by: INTERNAL MEDICINE

## 2017-02-17 PROCEDURE — 99999 PR PBB SHADOW E&M-EST. PATIENT-LVL III: CPT | Mod: PBBFAC,,, | Performed by: SURGERY

## 2017-02-17 PROCEDURE — 85025 COMPLETE CBC W/AUTO DIFF WBC: CPT

## 2017-02-17 RX ORDER — ACETAMINOPHEN 325 MG/1
650 TABLET ORAL
Status: CANCELLED | OUTPATIENT
Start: 2017-02-17

## 2017-02-17 RX ORDER — HYDROCODONE BITARTRATE AND ACETAMINOPHEN 500; 5 MG/1; MG/1
TABLET ORAL ONCE
Status: CANCELLED | OUTPATIENT
Start: 2017-02-17 | End: 2017-02-17

## 2017-02-17 RX ORDER — DIPHENHYDRAMINE HCL 25 MG
25 CAPSULE ORAL
Status: CANCELLED | OUTPATIENT
Start: 2017-02-17

## 2017-02-17 RX ORDER — SODIUM CHLORIDE 9 MG/ML
INJECTION, SOLUTION INTRAVENOUS CONTINUOUS
Status: CANCELLED | OUTPATIENT
Start: 2017-02-17

## 2017-02-17 NOTE — PROGRESS NOTES
See my prior note; review of systems, family history and social history are   unchanged.    HISTORY OF PRESENT ILLNESS:  A 51-year-old female status post:  1. Left upper arm 4-7 tapered AV graft placement 01/12/2017.  2. Known venous anastomotic stenosis.    Of note, at the time of surgery, she was found to have exceptionally thin and   friable 4 mm brachial vein, which was used as the outflow.  She is now starting   to use the graft and used both needles for the first time yesterday.  She said   this all went well.    PAST MEDICAL HISTORY:  1. End-stage renal disease, dialyzing Tuesday, Thursday, Saturday.  2. History of herpes simplex 1.  3. Hyperparathyroidism.  4. Prediabetes.    PAST SURGICAL HISTORY:  As above.  1. Tonsillectomy.  2. Breast surgery.  3. Salpingo-oophorectomy.    FAMILY HISTORY:  Positive for diabetes.    SOCIAL HISTORY:  Nonsmoker.    MEDICATIONS:  No anticoagulants or antiplatelet agents.  See EPIC for full list.    ALLERGIES:  Allopurinol.    REVIEW OF SYSTEMS:  Denies postprandial pain or DVT.  All other systems including eyes, ENT, respiratory, , musculoskeletal, breast,   psychiatric, lymph, allergy and immune are negative.    PHYSICAL EXAMINATION:  VITAL SIGNS:  See nursing notes.  She is in no acute distress.  RESPIRATORY:  Normal effort.  Clear to auscultation.  CARDIAC:  Regular rate and rhythm, nondisplaced PMI, no murmur.  EXTREMITIES:  Left arm shows a soft thrill with really no significant   pulsatility in the AV graft.    IMAGING:  Venous outflow shows a worsening venous outflow stenosis with velocity   of 791 compared to 699 prior.  This appears to be beyond the venous   anastomosis.  Flow volume is decreased to 1.5 liters, previously 1.7.    ASSESSMENT:  Very high-grade outflow stenosis, now with falling flow rates.  I   am surprised that her graft is not more pulsatile.  This suggests to me that the   predominance of her outflow may be retrograde rather than  antegrade.    Particularly, this requires intervention given the decreased flow rates.  She is   at increased risk for vein disruption given the frailty of these veins.  We   will wait another two weeks before proceeding.    PLAN:  1. Left fistulogram and possible venous outflow intervention 03/06/2017.  2. Cath lab case.  3. May require a covered stent.    The patient understands the risks and rationale of procedure and wishes to   proceed.      TRAVIS/SIMONE  dd: 02/17/2017 12:12:19 (CST)  td: 02/18/2017 05:49:01 (CST)  Doc ID   #[id number]  Job ID #[id number]    CC:

## 2017-02-17 NOTE — MR AVS SNAPSHOT
[facility] - Vascular Surgery  [address]  [facility] [address]  Phone: [phone]  Fax: [phone]                  Eva Bloom   2017 11:15 AM   Office Visit    Description:  Female : 1965   Provider:  FARIDEH Muñoz III, MD   Department:  [facility] - Vascular Surgery           Reason for Visit     Follow-up           Diagnoses this Visit        Comments    Stenosis due to any device, implant or graft, initial encounter    -  Primary     Stenosis due to any device, implant or graft         Stenosis of arteriovenous dialysis fistula, subsequent encounter                To Do List           Future Appointments        Provider Department Dept Phone    2017 4:00 PM Williams Hart MD [facility] - Nephrology [phone]    2017 9[facility]10 [facility] [phone]    2017 10:30 AM [facility] CHEMO [facility] [phone]    3/6/2017 6:15 AM 3PREP, [address] [facility] [phone]    3/17/2017 9:00 AM LAB, HEMONC CANCER BLDG [facility] [phone]      Goals (5 Years of Data)              9/8/16    10/9/15    HEMOGLOBIN A1C < 7.0   5.1  6.0    Related Problems    Prediabetes      [facility]     [facility] Care [facility] -  Assistance  Registered nurses in the [facility] provide clinical advisement, health education, appointment booking, and other advisory services.  Call for this free service at [phone].             Medications           Message regarding Medications     Verify the changes and/or additions to your medication regime listed below are the same as discussed with your clinician today.  If any of these changes or additions are incorrect, please notify your healthcare provider.             Verify that the below list of medications is an accurate representation of the medications you are currently taking.  If none reported, the list  "may be blank. If incorrect, please contact your healthcare provider. Carry this list with you in case of emergency.           Current Medications     acetaminophen (TYLENOL) 500 MG tablet Take 500 mg by mouth every 6 (six) hours as needed for Pain.    aluminum-magnesium hydroxide-simethicone (MAALOX) 200-200-20 mg/5 mL Susp Take 30 mLs by mouth every 6 (six) hours as needed.     cabozantinib 60 mg Tab Take 60 mg by mouth once daily.    calcitRIOL (ROCALTROL) 0.25 MCG Cap TAKE ONE CAPSULE BY MOUTH ONCE DAILY    febuxostat (ULORIC) 40 mg Tab Take 1 tablet (40 mg total) by mouth once daily.    lisinopril (PRINIVIL,ZESTRIL) 40 MG tablet Take 1 tablet (40 mg total) by mouth once daily.    multivitamin-Ca-iron-minerals (ONE-A-DAY WOMENS FORMULA) 27-0.4 mg Tab Take by mouth. 1 Tablet Oral Every day    nifedipine (PROCARDIA-XL) 60 MG (OSM) 24 hr tablet Take 1 tablet (60 mg total) by mouth once daily.    ondansetron (ZOFRAN-ODT) 8 MG TbDL Take 1 tablet (8 mg total) by mouth every 12 (twelve) hours as needed.    oxycodone-acetaminophen (PERCOCET) 5-325 mg per tablet Take 1 tablet by mouth every 4 (four) hours as needed.    polyethylene glycol (GLYCOLAX) 17 gram PwPk Take 17 g by mouth 2 (two) times daily.    psyllium husk-calcium (METAMUCIL PLUS CALCIUM) 1-60 gram-mg Cap Take by mouth. 2 Capsule Oral Every morning           Clinical Reference Information           Your Vitals Were     BP Pulse Temp Height Weight BMI    169/93 (BP Location: Right arm, Patient Position: Sitting, BP Method: Automatic) 109 96.1 °F (35.6 °C) (Oral) 5' 4" (1.626 m) 65.3 kg (144 lb) 24.72 kg/m2      Blood Pressure          Most Recent Value    BP  (!)  169/93      Allergies as of 2/17/2017     Allopurinol      Immunizations Administered on Date of Encounter - 2/17/2017     None      Orders Placed During Today's Visit      Normal Orders This Visit    Case Request-Cath Lab: FISTULOGRAM possible PTA possible stent       Maintenance Dialysis History     " Start End Type Comments Center    1/15/2016  Peritoneal  [facility]            Current Dialysis Center Information     [facility] [address] Phone #:  [phone]    Contact:  N/A [place]  [address] Fax #:  [phone]            Transplant Information        Txp Date Organ Coordinator Care Team     Kidney Kinsey Carballo, RN Referring Physician:  Williams Hart MD   Current Nephrologist:  Williams Hart MD         Language Assistance Services     ATTENTION: Language assistance services are available, free of charge. Please call [phone].      ATENCIÓN: Si habla español, tiene a bonilla disposición servicios gratuitos de asistencia lingüística. Llame al [phone].     CHÚ Ý: N?u b?n nói Ti?ng Vi?t, có các d?ch v? h? tr? ngôn ng? mi?n phí dành cho b?n. G?i s? [phone].         [facility] complies with applicable Federal civil rights laws and does not discriminate on the basis of race, color, national origin, age, disability, or sex.

## 2017-02-17 NOTE — PROGRESS NOTES
"Subjective:       Patient ID: Eva Bloom is a 51 y.o. female.    Chief Complaint: Metastatic renal cell carcinoma, left    HPI   51 year old female to clinic for 4 week follow up and to review labs for recurrent metastatic renal cell carcinoma.Pt is currently taking cabozantinib daily.  Since starting medication, pt reports an increase in fatigue, SOB with exertion, intermittent nausea, decreased appetite and altered taste sensation. Nausea controlled with antiemetic. Pt doing dialysis three times weekly.    Today, she denies any mouth sores, nausea, vomiting, diarrhea, constipation,weight loss, chest pain, shortness of breath, leg swelling, headache, dizziness, or mood changes.    ECOG 2. She is accompanied alone to clinic.    Oncologic History (From Chart and Patient):  Eva Bloom is a 51 y.o. female with ESRD on HD who was found to have two L renal masses. She underwent L lap nephrectomy on 1/12/16. 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 Margins were negative.  Shehealed from surgery well, but then developed a large ovarian mass.  This was removed by gyn along with multiple sites of metastatic disease in the pelvis.  Path was c/w recurrence of RCC.     11/20/16 Pelvic ultrasound reveals "Large heterogeneous cystic and solid mass which appears to arise from the right ovary with worrisome imaging features for malignancy.  Differential diagnosis includes malignant tumors such as serous or mucinous cystadenocarcinoma.  It is important to note that the patient is at risk for ovarian torsion due to the size of the mass.Multiple uterine fibroids are identified with the largest in the uterine fundus."    11/22/16 pathology reveals "FINAL PATHOLOGIC DIAGNOSIS-RIGHT OVARY AND FALLOPIAN TUBE, RIGHT SALPINGO-OOPHORECTOMY:  -Positive for malignancy, high grade carcinoma morphologically and " "immunohistochemically consistent with metastasis from the patient's known renal cell carcinoma"    1/23/17 MRI abdomen reveals "Status post left nephrectomy, the left nephrectomy bed appears normal.There is a small liver lesion and a small splenic lesion, they cannot be fully characterized but they are unchanged from 5/27/16 suggestive of benign lesions.Multiple right kidney cysts.Cholelithiasis.Soft tissue oval density noted within the mesentery adjacent to the aorta right iliac bifurcation, nonspecific, could represent an enlarged lymph node, it is unchanged from prior studies"    1/23/17- CT Chest reveals "Stable pulmonary nodules, likely benign. No new pulmonary parenchymal abnormalities are identified. Enlarging nodular foci in partially visualized left upper abdomen. Peritoneal implants not excluded. Multiple thyroid nodules, measuring up to 1.6 cm."    Review of Systems   Constitutional: Positive for appetite change and fatigue. Negative for activity change, chills and fever.   HENT: Negative for congestion, hearing loss, mouth sores, sore throat, tinnitus and voice change.    Eyes: Negative for pain and visual disturbance.   Respiratory: Positive for shortness of breath (with exertion). Negative for cough and wheezing.    Cardiovascular: Negative for chest pain, palpitations and leg swelling.   Gastrointestinal: Positive for nausea. Negative for abdominal pain, constipation, diarrhea and vomiting.   Endocrine: Negative for cold intolerance and heat intolerance.   Genitourinary: Negative for difficulty urinating, dyspareunia, dysuria, frequency, menstrual problem, urgency, vaginal bleeding, vaginal discharge and vaginal pain.   Musculoskeletal: Negative for arthralgias and myalgias.   Skin: Negative for color change, rash and wound.   Allergic/Immunologic: Negative for environmental allergies and food allergies.   Neurological: Negative for weakness, numbness and headaches.   Hematological: Negative for " adenopathy. Does not bruise/bleed easily.   Psychiatric/Behavioral: Negative for agitation, confusion, hallucinations and sleep disturbance. The patient is not nervous/anxious.    All other systems reviewed and are negative.      Allergies:  Review of patient's allergies indicates:   Allergen Reactions    Allopurinol      Other reaction(s): abnormal transaminases       Medications:  Current Outpatient Prescriptions   Medication Sig Dispense Refill    acetaminophen (TYLENOL) 500 MG tablet Take 500 mg by mouth every 6 (six) hours as needed for Pain.      aluminum-magnesium hydroxide-simethicone (MAALOX) 200-200-20 mg/5 mL Susp Take 30 mLs by mouth every 6 (six) hours as needed.       cabozantinib 60 mg Tab Take 60 mg by mouth once daily. 30 tablet 5    calcitRIOL (ROCALTROL) 0.25 MCG Cap TAKE ONE CAPSULE BY MOUTH ONCE DAILY 30 capsule 11    febuxostat (ULORIC) 40 mg Tab Take 1 tablet (40 mg total) by mouth once daily. 30 tablet 4    lisinopril (PRINIVIL,ZESTRIL) 40 MG tablet Take 1 tablet (40 mg total) by mouth once daily. 30 tablet 1    multivitamin-Ca-iron-minerals (ONE-A-DAY WOMENS FORMULA) 27-0.4 mg Tab Take by mouth. 1 Tablet Oral Every day      nifedipine (PROCARDIA-XL) 60 MG (OSM) 24 hr tablet Take 1 tablet (60 mg total) by mouth once daily. 30 tablet 1    ondansetron (ZOFRAN-ODT) 8 MG TbDL Take 1 tablet (8 mg total) by mouth every 12 (twelve) hours as needed. 30 tablet 1    oxycodone-acetaminophen (PERCOCET) 5-325 mg per tablet Take 1 tablet by mouth every 4 (four) hours as needed. 60 tablet 0    polyethylene glycol (GLYCOLAX) 17 gram PwPk Take 17 g by mouth 2 (two) times daily. (Patient taking differently: Take 17 g by mouth once daily. )  0    psyllium husk-calcium (METAMUCIL PLUS CALCIUM) 1-60 gram-mg Cap Take by mouth. 2 Capsule Oral Every morning       No current facility-administered medications for this visit.        PMH:  Past Medical History   Diagnosis Date    Anemia     Cancer      CMV (cytomegalovirus) antibody positive     Essential hypertension 9/23/2015    H/O herpes simplex type 2 infection     Herpes simplex type 1 antibody positive     History of kidney cancer      s/p left nephrectomy 1/2016    Hyperparathyroidism, unspecified     Hyperuricemia without signs of inflammatory arthritis and tophaceous disease     Kidney stones     LGSIL (low grade squamous intraepithelial dysplasia)     Prediabetes     Proteinuria     Thyroid nodule     Urate nephropathy        PSH:  Past Surgical History   Procedure Laterality Date    Tonsillectomy      Breast surgery       LUMPECTOMY    Tubal ligation      Colonoscopy N/A 11/12/2015    Nephrectomy-laparoscopic Left 01/12/2016    Peritoneal catheter insertion      Salpingoophorectomy Right 2016     KJB---GENE       FamHx:  Family History   Problem Relation Age of Onset    Hypertension Mother     Diabetes Father     Kidney disease Father     Diabetes Maternal Grandfather     No Known Problems Sister     No Known Problems Brother     Heart disease Sister     No Known Problems Sister     No Known Problems Brother     Breast cancer Neg Hx     Colon cancer Neg Hx     Cancer Neg Hx     Stroke Neg Hx        SocHx:  Social History     Social History    Marital status:      Spouse name: N/A    Number of children: 2    Years of education: N/A     Occupational History    [employer]     Social History Main Topics    Smoking status: Never Smoker    Smokeless tobacco: Never Used    Alcohol use No      Comment: . 2 children. works at [employer].    Drug use: No    Sexual activity: Yes     Partners: Male     Other Topics Concern    Not on file     Social History Narrative         Objective:      Physical Exam   Constitutional: She is oriented to person, place, and time. She appears well-developed and well-nourished. No distress.   HENT:   Head: Normocephalic and atraumatic.   Nose: Nose normal.    Mouth/Throat: Oropharynx is clear and moist. No oropharyngeal exudate.   Eyes: Conjunctivae and EOM are normal. Pupils are equal, round, and reactive to light. Right eye exhibits no discharge. Left eye exhibits no discharge.   Neck: Normal range of motion. Neck supple. No tracheal deviation present.   Cardiovascular: Normal rate, regular rhythm and normal heart sounds.  Exam reveals no gallop and no friction rub.    No murmur heard.  Pulmonary/Chest: Effort normal and breath sounds normal. No respiratory distress. She has no wheezes. She has no rales.   Abdominal: Soft. Bowel sounds are normal. She exhibits no distension. There is no tenderness. There is no rebound and no guarding.   Genitourinary: No breast tenderness, discharge or bleeding.   Musculoskeletal: Normal range of motion. She exhibits no edema or tenderness.   Neurological: She is alert and oriented to person, place, and time. She has normal reflexes. No cranial nerve deficit. Coordination normal.   Skin: Skin is warm and dry. No rash noted. She is not diaphoretic. No erythema. No pallor.   Psychiatric: She has a normal mood and affect. Her behavior is normal. Thought content normal.   Nursing note and vitals reviewed.      LABS:  WBC   Date Value Ref Range Status   02/17/2017 6.25 3.90 - 12.70 K/uL Final     Hemoglobin   Date Value Ref Range Status   02/17/2017 7.7 (L) 12.0 - 16.0 g/dL Final     Hematocrit   Date Value Ref Range Status   02/17/2017 24.9 (L) 37.0 - 48.5 % Final     Platelets   Date Value Ref Range Status   02/17/2017 136 (L) 150 - 350 K/uL Final       Chemistry        Component Value Date/Time     02/17/2017 0810    K 4.4 02/17/2017 0810    CL 98 02/17/2017 0810    CO2 32 (H) 02/17/2017 0810    BUN 32 (H) 02/17/2017 0810    CREATININE 7.9 (H) 02/17/2017 0810     02/17/2017 0810        Component Value Date/Time    CALCIUM 8.8 02/17/2017 0810    ALKPHOS 73 02/17/2017 0810    AST 34 02/17/2017 0810    ALT 33 02/17/2017 0810  "   BILITOT 0.6 02/17/2017 0810        1/23/17 MRI abdomen reveals "Status post left nephrectomy, the left nephrectomy bed appears normal.There is a small liver lesion and a small splenic lesion, they cannot be fully characterized but they are unchanged from 5/27/16 suggestive of benign lesions.Multiple right kidney cysts.Cholelithiasis.Soft tissue oval density noted within the mesentery adjacent to the aorta right iliac bifurcation, nonspecific, could represent an enlarged lymph node, it is unchanged from prior studies"    1/23/17- CT Chest reveals "Stable pulmonary nodules, likely benign. No new pulmonary parenchymal abnormalities are identified. Enlarging nodular foci in partially visualized left upper abdomen. Peritoneal implants not excluded. Multiple thyroid nodules, measuring up to 1.6 cm."    Assessment:       1. Metastatic renal cell carcinoma, left    2. Malignant neoplasm metastatic to peritoneum    3. History of right oophorectomy    4. S/p nephrectomy left    5. Encounter for chemotherapy management    6. Chemotherapy-induced thrombocytopenia    7. Anemia in ESRD (end-stage renal disease)    8. ESRD on hemodialysis    9. Hypertensive kidney disease with chronic kidney disease, stage 1-4 or unspecified chronic kidney disease    10. Chemotherapy induced nausea and vomiting    11. Thyroid nodule        Plan:     1,2,3,4,5- Metastatic Renal Cell Carcinoma:    - It is unlikely that surgery removed all sites of metastatic disease. Recent restaging scans show no reoccurrence.  Given that this is c/w her previous papillary disease, pt started on oral cabozantinib 60mg daily (for dual VEGF and MET inhibition), especially given recent data that shows cabo is far superior to student in the first line setting.  If pt progresses on this regimen, will switch to nivolumab.     Pt tolerating cabozantinib well. RTC in 4 weeks with labs (CBC,CMP) and to see me.    7,8- Pt symptomatic. Will transfuse 1 unit " PRBCs.    9-Elevated today. Will monitor. Pt reports compliance with meds.    10- Stable with meds.    11- Had thyroid US and biopsy last year, which was negative. Will monitor for now.      Patient was also seen and examined by Dr. Rivera. Patient is in agreement with the proposed treatment plan. All questions were answered to the patient's satisfaction. Pt knows to call clinic if anything is needed before the next clinic visit.    TONA Telles-C  Hematology and Medical Oncology      I have reviewed the notes, assessments, and/or procedures performed by the nurse practitioner, as above.  I have personally interviewed the patient at the beside, and rounded with the nurse practitioner. I formulated the plan of care.  I concur with her documentation of Eva Bloom.  More than 25 mins were spent during this encounter, greater than 50% was spent in direct counseling and/or coordination of care.     Ben Rivera M.D., M.S.  Hematology/Oncology Attending  [facility]

## 2017-02-17 NOTE — Clinical Note
Schedule type and screen and transfuse 1 unit PRBCs on 2/24/17.  RTC in 4 weeks with labs (CBC,CMP) and to see me.

## 2017-03-01 ENCOUNTER — OFFICE VISIT (OUTPATIENT)
Dept: FAMILY MEDICINE | Facility: CLINIC | Age: 52
End: 2017-03-01
Payer: COMMERCIAL

## 2017-03-01 VITALS
SYSTOLIC BLOOD PRESSURE: 166 MMHG | HEIGHT: 64 IN | OXYGEN SATURATION: 95 % | WEIGHT: 146.69 LBS | TEMPERATURE: 98 F | BODY MASS INDEX: 25.04 KG/M2 | HEART RATE: 108 BPM | DIASTOLIC BLOOD PRESSURE: 90 MMHG

## 2017-03-01 DIAGNOSIS — J20.8 VIRAL BRONCHITIS: Primary | ICD-10-CM

## 2017-03-01 DIAGNOSIS — R05.9 COUGHING: ICD-10-CM

## 2017-03-01 DIAGNOSIS — R06.2 WHEEZING: ICD-10-CM

## 2017-03-01 DIAGNOSIS — I10 ESSENTIAL HYPERTENSION: ICD-10-CM

## 2017-03-01 DIAGNOSIS — N18.6 ESRD (END STAGE RENAL DISEASE) ON DIALYSIS: ICD-10-CM

## 2017-03-01 DIAGNOSIS — Z99.2 ESRD (END STAGE RENAL DISEASE) ON DIALYSIS: ICD-10-CM

## 2017-03-01 PROCEDURE — 1160F RVW MEDS BY RX/DR IN RCRD: CPT | Mod: S$GLB,,, | Performed by: NURSE PRACTITIONER

## 2017-03-01 PROCEDURE — 99999 PR PBB SHADOW E&M-EST. PATIENT-LVL IV: CPT | Mod: PBBFAC,,, | Performed by: NURSE PRACTITIONER

## 2017-03-01 PROCEDURE — 99214 OFFICE O/P EST MOD 30 MIN: CPT | Mod: S$GLB,,, | Performed by: NURSE PRACTITIONER

## 2017-03-01 RX ORDER — AMLODIPINE BESYLATE 5 MG/1
5 TABLET ORAL DAILY
Qty: 30 TABLET | Refills: 11 | Status: SHIPPED | OUTPATIENT
Start: 2017-03-01 | End: 2017-03-16 | Stop reason: ALTCHOICE

## 2017-03-01 RX ORDER — CODEINE PHOSPHATE AND GUAIFENESIN 10; 100 MG/5ML; MG/5ML
5 SOLUTION ORAL 3 TIMES DAILY PRN
Qty: 180 ML | Refills: 0 | Status: SHIPPED | OUTPATIENT
Start: 2017-03-01 | End: 2017-03-11

## 2017-03-01 RX ORDER — ALBUTEROL SULFATE 90 UG/1
2 AEROSOL, METERED RESPIRATORY (INHALATION) EVERY 6 HOURS PRN
Qty: 1 INHALER | Refills: 0 | Status: SHIPPED | OUTPATIENT
Start: 2017-03-01 | End: 2017-03-28 | Stop reason: SDUPTHER

## 2017-03-01 NOTE — MR AVS SNAPSHOT
[facility]  [address] [facility]  [place] [address]  Phone: [phone]  Fax: [phone]                  Eva Bloom   3/1/2017 11:20 AM   Office Visit    Description:  Female : 1965   Provider:  NAS Mckenzie   Department:  [facility]           Reason for Visit     Shortness of Breath     Cough     Chest Pain           Diagnoses this Visit        Comments    Viral bronchitis    -  Primary     ESRD (end stage renal disease) on dialysis         Essential hypertension         Coughing         Wheezing                To Do List           Future Appointments        Provider Department Dept Phone    3/6/2017 6:15 AM [address]PREP, [address] [facility] [phone]    3/17/2017 9:00 AM LAB, HEMONC CANCER BLDG [facility] [phone]    3/17/2017 10:00 AM MD Wes Agudeloson - Hematology Oncology [phone]    6/15/2017 9:40 AM Alison Rivera MD [facility] - Urology [phone]      Your Future Surgeries/Procedures     Mar 06, 2017   Surgery with FARIDEH Muñoz III, MD   [facility]    [address] [address]   [phone]              Goals (5 Years of Data)              9/8/16    10/9/15    HEMOGLOBIN A1C < 7.0   5.1  6.0    Related Problems    Prediabetes       These Medications        Disp Refills Start End    amlodipine (NORVASC) 5 MG tablet 30 tablet 11 3/1/2017 3/1/2018    Take 1 tablet (5 mg total) by mouth once daily. - Oral    Pharmacy: [facility] [place], [place] - [address] [facility] Ph #: [phone]       albuterol 90 mcg/actuation inhaler 1 Inhaler 0 3/1/2017     Inhale 2 puffs into the lungs every 6 (six) hours as needed. - Inhalation    Pharmacy: [facility] - [place] - [address] [facility] Ph #: [phone]       guaifenesin-codeine 100-10 mg/5 ml (TUSSI-ORGANIDIN NR)  mg/5 mL  syrup 180 mL 0 3/1/2017 3/11/2017    Take 5 mLs by mouth 3 (three) times daily as needed. - Oral    Pharmacy: [facility] [address] [place], [place] [address], [place] [address] [place] [facility] #: [phone]         [facility]     [facility] On Call Nurse Care Line - 24/7 Assistance  Registered nurses in the [facility] On Call Center provide clinical advisement, health education, appointment booking, and other advisory services.  Call for this free service at [phone].             Medications           Message regarding Medications     Verify the changes and/or additions to your medication regime listed below are the same as discussed with your clinician today.  If any of these changes or additions are incorrect, please notify your healthcare provider.        START taking these NEW medications        Refills    amlodipine (NORVASC) 5 MG tablet 11    Sig: Take 1 tablet (5 mg total) by mouth once daily.    Class: Normal    Route: Oral    albuterol 90 mcg/actuation inhaler 0    Sig: Inhale 2 puffs into the lungs every 6 (six) hours as needed.    Class: Normal    Route: Inhalation    guaifenesin-codeine 100-10 mg/5 ml (TUSSI-ORGANIDIN NR)  mg/5 mL syrup 0    Sig: Take 5 mLs by mouth 3 (three) times daily as needed.    Class: Print    Route: Oral      STOP taking these medications     calcitRIOL (ROCALTROL) 0.25 MCG Cap TAKE ONE CAPSULE BY MOUTH ONCE DAILY    oxycodone-acetaminophen (PERCOCET) 5-325 mg per tablet Take 1 tablet by mouth every 4 (four) hours as needed.    polyethylene glycol (GLYCOLAX) 17 gram PwPk Take 17 g by mouth 2 (two) times daily.           Verify that the below list of medications is an accurate representation of the medications you are currently taking.  If none reported, the list may be blank. If incorrect, please contact your healthcare provider. Carry this list with you in case of emergency.           Current Medications     acetaminophen (TYLENOL) 500 MG tablet Take 500 mg by mouth every 6  (six) hours as needed for Pain.    aluminum-magnesium hydroxide-simethicone (MAALOX) 200-200-20 mg/5 mL Susp Take 30 mLs by mouth every 6 (six) hours as needed.     cabozantinib 60 mg Tab Take 60 mg by mouth once daily.    febuxostat (ULORIC) 40 mg Tab Take 1 tablet (40 mg total) by mouth once daily.    lisinopril (PRINIVIL,ZESTRIL) 40 MG tablet Take 1 tablet (40 mg total) by mouth once daily.    multivitamin-Ca-iron-minerals (ONE-A-DAY WOMENS FORMULA) 27-0.4 mg Tab Take by mouth. 1 Tablet Oral Every day    nifedipine (PROCARDIA-XL) 60 MG (OSM) 24 hr tablet Take 1 tablet (60 mg total) by mouth once daily.    ondansetron (ZOFRAN-ODT) 8 MG TbDL Take 1 tablet (8 mg total) by mouth every 12 (twelve) hours as needed.    psyllium husk-calcium (METAMUCIL PLUS CALCIUM) 1-60 gram-mg Cap Take by mouth. 2 Capsule Oral Every morning    albuterol 90 mcg/actuation inhaler Inhale 2 puffs into the lungs every 6 (six) hours as needed.    amlodipine (NORVASC) 5 MG tablet Take 1 tablet (5 mg total) by mouth once daily.    guaifenesin-codeine 100-10 mg/5 ml (TUSSI-ORGANIDIN NR)  mg/5 mL syrup Take 5 mLs by mouth 3 (three) times daily as needed.           Clinical Reference Information           Your Vitals Were     BP                   166/90 (BP Location: Right arm, Patient Position: Sitting, BP Method: Manual)           Blood Pressure          Most Recent Value    BP  (!)  166/90      Allergies as of 3/1/2017     Allopurinol      Immunizations Administered on Date of Encounter - 3/1/2017     None      Maintenance Dialysis History     Start End Type Comments Center    1/15/2016  Peritoneal  [facility]            Current Dialysis Center Information     [facility] [address] Phone #:  [phone]    Contact:  [place]  [address] Fax #:  [phone]            Transplant Information        Txp Date Organ Coordinator Care Team     Kidney Kinsey Carballo RN Referring Physician:  Williams Hart  MD   Current Nephrologist:  Williams Hart MD         Language Assistance Services     ATTENTION: Language assistance services are available, free of charge. Please call [phone].      ATENCIÓN: Si habluc nunez, tiene a bonilla disposición servicios gratuitos de asistencia lingüística. Llame al [phone].     CHÚ Ý: N?u b?n nói Ti?ng Vi?t, có các d?ch v? h? tr? ngôn ng? mi?n phí dành cho b?n. G?i s? [phone].         [employer] complies with applicable Federal civil rights laws and does not discriminate on the basis of race, color, national origin, age, disability, or sex.

## 2017-03-01 NOTE — PROGRESS NOTES
Subjective:       Patient ID: Eva Bloom is a 51 y.o. female.    Chief Complaint: Shortness of Breath; Cough (Dry); and Chest Pain (Due to cough and when breathing deep)    HPI Comments: 51-year-old female presents to the clinic today with complaint of coughing, wheezing, shortness of breath for the past 3 days.  She denies any fever, chills, sore throat, sinus congestion, ear pain, abdominal pain, nausea, vomiting, or diarrhea.  She denies any cardiac chest pain, heart palpitations, shortness breath, or swelling to lower extremities.  She is and end-stage renal disease and receives hemodialysis 3 times a week.  She has not tried anything yet for the cough.  She says her blood pressure has been running high and dialysis about 160-170/80-90.  She is compliant with all of her current blood pressure medications.    Past Medical History:   Diagnosis Date    Anemia     Cancer     CMV (cytomegalovirus) antibody positive     Essential hypertension 9/23/2015    H/O herpes simplex type 2 infection     Herpes simplex type 1 antibody positive     History of kidney cancer     s/p left nephrectomy 1/2016    Hyperparathyroidism, unspecified     Hyperuricemia without signs of inflammatory arthritis and tophaceous disease     Kidney stones     LGSIL (low grade squamous intraepithelial dysplasia)     Prediabetes     Proteinuria     Thyroid nodule     Urate nephropathy      Past Surgical History:   Procedure Laterality Date    BREAST SURGERY      LUMPECTOMY    COLONOSCOPY N/A 11/12/2015    NEPHRECTOMY-LAPAROSCOPIC Left 01/12/2016    PERITONEAL CATHETER INSERTION      SALPINGOOPHORECTOMY Right 2016    KJB---DAVINCI    TONSILLECTOMY      TUBAL LIGATION        reports that she has never smoked. She has never used smokeless tobacco. She reports that she does not drink alcohol or use illicit drugs.  Review of Systems   Constitutional: Negative for chills and fever.   HENT: Negative for congestion, ear  discharge, ear pain, postnasal drip, rhinorrhea, sinus pressure, sneezing and sore throat.    Eyes: Negative for pain, discharge and itching.   Respiratory: Positive for cough, shortness of breath and wheezing.    Cardiovascular: Negative for chest pain, palpitations and leg swelling.   Gastrointestinal: Negative for abdominal pain, diarrhea, nausea and vomiting.   Musculoskeletal: Negative for gait problem, neck pain and neck stiffness.   Skin: Negative for rash.   Neurological: Negative for dizziness, light-headedness and headaches.       Objective:      Physical Exam   Constitutional: She is oriented to person, place, and time. She appears well-developed and well-nourished. No distress.   HENT:   Head: Normocephalic and atraumatic.   Right Ear: External ear normal.   Left Ear: External ear normal.   Nose: Nose normal.   Mouth/Throat: Oropharynx is clear and moist. No oropharyngeal exudate.   Eyes: Conjunctivae and EOM are normal. Pupils are equal, round, and reactive to light. Right eye exhibits no discharge. Left eye exhibits no discharge. No scleral icterus.   Neck: Normal range of motion. Neck supple. No JVD present.   Cardiovascular: Normal rate, regular rhythm and normal heart sounds.  Exam reveals no gallop and no friction rub.    No murmur heard.  Pulmonary/Chest: Effort normal and breath sounds normal. No respiratory distress. She has no wheezes. She has no rales.   Congested cough no wheezing noted   Abdominal: Soft. Bowel sounds are normal. There is no tenderness. There is no rebound and no guarding.   Musculoskeletal: Normal range of motion. She exhibits no edema.   AV shunt left upper arm strong thrill    Lymphadenopathy:     She has no cervical adenopathy.   Neurological: She is alert and oriented to person, place, and time.   Skin: Skin is warm and dry. She is not diaphoretic.   Psychiatric: She has a normal mood and affect.       Assessment:       1. Viral bronchitis    2. ESRD (end stage renal  disease) on dialysis    3. Essential hypertension    4. Coughing    5. Wheezing        Plan:         Viral bronchitis    ESRD (end stage renal disease) on dialysis  - dialysis three days a week     Essential hypertension  -     amlodipine (NORVASC) 5 MG tablet; Take 1 tablet (5 mg total) by mouth once daily.  Dispense: 30 tablet; Refill: 11  - Start Norvasc 5 mg po daily  - continue all other blood pressure medications   Coughing  -     guaifenesin-codeine 100-10 mg/5 ml (TUSSI-ORGANIDIN NR)  mg/5 mL syrup; Take 5 mLs by mouth 3 (three) times daily as needed.  Dispense: 180 mL; Refill: 0    Wheezing  -     albuterol 90 mcg/actuation inhaler; Inhale 2 puffs into the lungs every 6 (six) hours as needed.  Dispense: 1 Inhaler; Refill: 0

## 2017-03-03 ENCOUNTER — TELEPHONE (OUTPATIENT)
Dept: VASCULAR SURGERY | Facility: CLINIC | Age: 52
End: 2017-03-03

## 2017-03-06 ENCOUNTER — HOSPITAL ENCOUNTER (OUTPATIENT)
Facility: HOSPITAL | Age: 52
Discharge: HOME | End: 2017-03-06
Attending: SURGERY | Admitting: SURGERY
Payer: COMMERCIAL

## 2017-03-06 VITALS
DIASTOLIC BLOOD PRESSURE: 77 MMHG | HEART RATE: 120 BPM | RESPIRATION RATE: 16 BRPM | WEIGHT: 143 LBS | HEIGHT: 64 IN | TEMPERATURE: 99 F | OXYGEN SATURATION: 92 % | SYSTOLIC BLOOD PRESSURE: 139 MMHG | BODY MASS INDEX: 24.41 KG/M2

## 2017-03-06 DIAGNOSIS — T85.858A STENOSIS DUE TO ANY DEVICE, IMPLANT OR GRAFT: ICD-10-CM

## 2017-03-06 LAB
ANION GAP SERPL CALC-SCNC: 15 MMOL/L
BUN SERPL-MCNC: 40 MG/DL
CALCIUM SERPL-MCNC: 8.7 MG/DL
CHLORIDE SERPL-SCNC: 98 MMOL/L
CO2 SERPL-SCNC: 27 MMOL/L
CREAT SERPL-MCNC: 10.6 MG/DL
EST. GFR  (AFRICAN AMERICAN): 4.3 ML/MIN/1.73 M^2
EST. GFR  (NON AFRICAN AMERICAN): 3.7 ML/MIN/1.73 M^2
GLUCOSE SERPL-MCNC: 83 MG/DL
HCG INTACT+B SERPL-ACNC: 5.7 MIU/ML
PERIPHERAL STENT: YES
POTASSIUM SERPL-SCNC: 4.6 MMOL/L
SODIUM SERPL-SCNC: 140 MMOL/L

## 2017-03-06 PROCEDURE — 36903 INTRO CATH DIALYSIS CIRCUIT: CPT | Mod: 78,,, | Performed by: SURGERY

## 2017-03-06 PROCEDURE — 25000003 PHARM REV CODE 250

## 2017-03-06 PROCEDURE — 84702 CHORIONIC GONADOTROPIN TEST: CPT

## 2017-03-06 PROCEDURE — 99152 MOD SED SAME PHYS/QHP 5/>YRS: CPT | Mod: ,,, | Performed by: SURGERY

## 2017-03-06 PROCEDURE — C1769 GUIDE WIRE: HCPCS

## 2017-03-06 PROCEDURE — C1894 INTRO/SHEATH, NON-LASER: HCPCS

## 2017-03-06 PROCEDURE — 80048 BASIC METABOLIC PNL TOTAL CA: CPT

## 2017-03-06 PROCEDURE — 63600175 PHARM REV CODE 636 W HCPCS

## 2017-03-06 RX ORDER — SODIUM CHLORIDE 9 MG/ML
INJECTION, SOLUTION INTRAVENOUS CONTINUOUS
Status: DISCONTINUED | OUTPATIENT
Start: 2017-03-06 | End: 2017-03-06 | Stop reason: HOSPADM

## 2017-03-06 RX ORDER — HYDROCODONE BITARTRATE AND ACETAMINOPHEN 5; 325 MG/1; MG/1
1 TABLET ORAL EVERY 4 HOURS PRN
Status: DISCONTINUED | OUTPATIENT
Start: 2017-03-06 | End: 2017-03-06 | Stop reason: HOSPADM

## 2017-03-06 NOTE — DISCHARGE SUMMARY
[facility]  Discharge Note  Short Stay    Admit Date: 3/6/2017    Discharge Date and Time: 3/6/2017 10:40 AM     Attending Physician: MANPREET Muñoz    Discharge Provider: Tyler Zuluaga    Diagnoses:  Active Hospital Problems    Diagnosis  POA    *Stenosis due to any device, implant or graft [T85.858A]  Yes      Resolved Hospital Problems    Diagnosis Date Resolved POA   No resolved problems to display.       Discharged Condition: good    Hospital Course: Patient was admitted for an outpatient procedure and tolerated the procedure well with no complications.    Final Diagnoses: Same as principal problem.    Disposition: Home or Self Care    Follow up/Patient Instructions:    Medications:  Reconciled Home Medications:   Discharge Medication List as of 3/6/2017 10:28 AM      CONTINUE these medications which have NOT CHANGED    Details   albuterol 90 mcg/actuation inhaler Inhale 2 puffs into the lungs every 6 (six) hours as needed., Starting 3/1/2017, Until Discontinued, Normal      aluminum-magnesium hydroxide-simethicone (MAALOX) 200-200-20 mg/5 mL Susp Take 30 mLs by mouth every 6 (six) hours as needed. , Until Discontinued, Historical Med      amlodipine (NORVASC) 5 MG tablet Take 1 tablet (5 mg total) by mouth once daily., Starting 3/1/2017, Until Thu 3/1/18, Normal      guaifenesin-codeine 100-10 mg/5 ml (TUSSI-ORGANIDIN NR)  mg/5 mL syrup Take 5 mLs by mouth 3 (three) times daily as needed., Starting 3/1/2017, Until Sat 3/11/17, Print      lisinopril (PRINIVIL,ZESTRIL) 40 MG tablet Take 1 tablet (40 mg total) by mouth once daily., Starting 1/27/2017, Until Discontinued, Normal      multivitamin-Ca-iron-minerals (ONE-A-DAY WOMENS FORMULA) 27-0.4 mg Tab Take by mouth. 1 Tablet Oral Every day, Until Discontinued, Historical Med      nifedipine (PROCARDIA-XL) 60 MG (OSM) 24 hr tablet Take 1 tablet (60 mg total) by mouth once daily., Starting 1/27/2017, Until Sat 1/27/18, Normal      ondansetron  (ZOFRAN-ODT) 8 MG TbDL Take 1 tablet (8 mg total) by mouth every 12 (twelve) hours as needed., Starting 1/27/2017, Until Sat 1/27/18, Normal      psyllium husk-calcium (METAMUCIL PLUS CALCIUM) 1-60 gram-mg Cap Take by mouth. 2 Capsule Oral Every morning, Until Discontinued, Historical Med      acetaminophen (TYLENOL) 500 MG tablet Take 500 mg by mouth every 6 (six) hours as needed for Pain., Until Discontinued, Historical Med      cabozantinib 60 mg Tab Take 60 mg by mouth once daily., Starting 12/14/2016, Until Discontinued, Normal      febuxostat (ULORIC) 40 mg Tab Take 1 tablet (40 mg total) by mouth once daily., Starting 1/27/2017, Until Discontinued, Normal             Discharge Procedure Orders  VAS US Hemodialysis Access   Standing Status: Future  Standing Exp. Date: 03/06/18     Diet general     Call MD for:  persistent dizziness or light-headedness     Call MD for:  hives     Call MD for:  redness, tenderness, or signs of infection (pain, swelling, redness, odor or green/yellow discharge around incision site)     Call MD for:  difficulty breathing, headache or visual disturbances     Call MD for:  severe uncontrolled pain     Call MD for:  persistent nausea and vomiting     Call MD for:  temperature >100.4     Remove dressing in 24 hours       Follow-up Information     Follow up with FARIDEH Muñoz Iii, MD In 1 week.    Specialty:  Vascular Surgery    Contact information:    [address] [facility] [address]  [phone]            Discharge Procedure Orders (must include Diet, Follow-up, Activity):    Discharge Procedure Orders (must include Diet, Follow-up, Activity)  VAS US Hemodialysis Access   Standing Status: Future  Standing Exp. Date: 03/06/18     Diet general     Call MD for:  persistent dizziness or light-headedness     Call MD for:  hives     Call MD for:  redness, tenderness, or signs of infection (pain, swelling, redness, odor or green/yellow discharge around incision site)      Call MD for:  difficulty breathing, headache or visual disturbances     Call MD for:  severe uncontrolled pain     Call MD for:  persistent nausea and vomiting     Call MD for:  temperature >100.4     Remove dressing in 24 hours

## 2017-03-06 NOTE — NURSING
Pt is AAOx3 and in no apparent distress.  L upper arm fistula site is c/d/i.  Provided a copy of discharge instructions.  Teaching performed.  Pt verbalized understanding and denied any questions.   PIV d/c catheter tip intact.  2x2 applied and no active bleeding noted.  Pt refused wheelchair and will walk out with  for transport home.

## 2017-03-06 NOTE — IP AVS SNAPSHOT
[facility]  [address]  [facility] [address]  Phone: [phone]           Patient Discharge Instructions     Our goal is to set you up for success. This packet includes information on your condition, medications, and your home care. It will help you to care for yourself so you don't get sicker and need to go back to the hospital.     Please ask your nurse if you have any questions.        There are many details to remember when preparing to leave the hospital. Here is what you will need to do:    1. Take your medicine. If you are prescribed medications, review your Medication List in the following pages. You may have new medications to  at the pharmacy and others that you'll need to stop taking. Review the instructions for how and when to take your medications. Talk with your doctor or nurses if you are unsure of what to do.     2. Go to your follow-up appointments. Specific follow-up information is listed in the following pages. Your may be contacted by a transition nurse or clinical provider about future appointments. Be sure we have all of the phone numbers to reach you, if needed. Please contact your provider's office if you are unable to make an appointment.     3. Watch for warning signs. Your doctor or nurse will give you detailed warning signs to watch for and when to call for assistance. These instructions may also include educational information about your condition. If you experience any of warning signs to your health, call your doctor.               [facility]  Unless otherwise directed by your provider, please contact [facility], our nurse care line that is available for 24/7 assistance.     [phone] (toll-free)    Registered nurses in the [facility] provide clinical advisement, health education, appointment booking, and other advisory services.                    ** Verify the list of medication(s) below is accurate and up  to date. Carry this with you in case of emergency. If your medications have changed, please notify your healthcare provider.             Medication List      CONTINUE taking these medications        Additional Info                      acetaminophen 500 MG tablet   Commonly known as:  TYLENOL   Refills:  0   Dose:  500 mg    Instructions:  Take 500 mg by mouth every 6 (six) hours as needed for Pain.     Begin Date    AM    Noon    PM    Bedtime       albuterol 90 mcg/actuation inhaler   Quantity:  1 Inhaler   Refills:  0   Dose:  2 puff    Instructions:  Inhale 2 puffs into the lungs every 6 (six) hours as needed.     Begin Date    AM    Noon    PM    Bedtime       aluminum-magnesium hydroxide-simethicone 200-200-20 mg/5 mL Susp   Commonly known as:  MAALOX   Refills:  0   Dose:  30 mL   Indications:  prn    Instructions:  Take 30 mLs by mouth every 6 (six) hours as needed.     Begin Date    AM    Noon    PM    Bedtime       amlodipine 5 MG tablet   Commonly known as:  NORVASC   Quantity:  30 tablet   Refills:  11   Dose:  5 mg    Instructions:  Take 1 tablet (5 mg total) by mouth once daily.     Begin Date    AM    Noon    PM    Bedtime       cabozantinib 60 mg Tab   Quantity:  30 tablet   Refills:  5   Dose:  60 mg    Instructions:  Take 60 mg by mouth once daily.     Begin Date    AM    Noon    PM    Bedtime       febuxostat 40 mg Tab   Commonly known as:  ULORIC   Quantity:  30 tablet   Refills:  4   Dose:  40 mg    Instructions:  Take 1 tablet (40 mg total) by mouth once daily.     Begin Date    AM    Noon    PM    Bedtime       guaifenesin-codeine 100-10 mg/5 ml  mg/5 mL syrup   Commonly known as:  TUSSI-ORGANIDIN NR   Quantity:  180 mL   Refills:  0   Dose:  5 mL    Instructions:  Take 5 mLs by mouth 3 (three) times daily as needed.     Begin Date    AM    Noon    PM    Bedtime       lisinopril 40 MG tablet   Commonly known as:  PRINIVIL,ZESTRIL   Quantity:  30 tablet   Refills:  1   Dose:  40 mg     Instructions:  Take 1 tablet (40 mg total) by mouth once daily.     Begin Date    AM    Noon    PM    Bedtime       METAMUCIL PLUS CALCIUM 1-60 gram-mg Cap   Refills:  0   Generic drug:  psyllium husk-calcium    Instructions:  Take by mouth. 2 Capsule Oral Every morning     Begin Date    AM    Noon    PM    Bedtime       nifedipine 60 MG (OSM) 24 hr tablet   Commonly known as:  PROCARDIA-XL   Quantity:  30 tablet   Refills:  1   Dose:  60 mg    Instructions:  Take 1 tablet (60 mg total) by mouth once daily.     Begin Date    AM    Noon    PM    Bedtime       ondansetron 8 MG Tbdl   Commonly known as:  ZOFRAN-ODT   Quantity:  30 tablet   Refills:  1   Dose:  8 mg    Instructions:  Take 1 tablet (8 mg total) by mouth every 12 (twelve) hours as needed.     Begin Date    AM    Noon    PM    Bedtime       ONE-A-DAY WOMENS FORMULA 27-0.4 mg Tab   Refills:  0   Generic drug:  multivitamin-Ca-iron-minerals    Instructions:  Take by mouth. 1 Tablet Oral Every day     Begin Date    AM    Noon    PM    Bedtime                  Please bring to all follow up appointments:    1. A copy of your discharge instructions.  2. All medicines you are currently taking in their original bottles.  3. Identification and insurance card.    Please arrive 15 minutes ahead of scheduled appointment time.    Please call 24 hours in advance if you must reschedule your appointment and/or time.        Your Scheduled Appointments     Mar 17, 2017  9:00 AM CDT   Non-Fasting Lab with LAB, HEMONC CANCER BLDG [facility] ([facility])    [address]  [place] [address]   [phone]            Mar 17, 2017 10:00 AM CDT   Established Patient Visit with MD Hubert Agudelo - Hematology Oncology ([facility])    [address]  [place] [address]   [phone]            Mar 31, 2017  8:20 AM CDT   Established Patient Visit with Jackie Ruelas, BARBP-C   [facility]  "([place])    [address]  [place] [address] [phone]            Jeff 15, 2017  9:40 AM CDT   Established Patient Visit with Alison Rivera MD   [facility] - Urology ([facility])    [address] [facility] [address]  [address]  [place] [address] [phone]              Follow-up Information     Follow up with FARIDEH Muñoz Iii, MD In 1 week.    Specialty:  Vascular Surgery    Contact information:    [address]  [facility] [address] [phone]          Discharge Instructions     Future Orders    Call MD for:  difficulty breathing, headache or visual disturbances     Call MD for:  hives     Call MD for:  persistent dizziness or light-headedness     Call MD for:  persistent nausea and vomiting     Call MD for:  redness, tenderness, or signs of infection (pain, swelling, redness, odor or green/yellow discharge around incision site)     Call MD for:  severe uncontrolled pain     Call MD for:  temperature >100.4     Diet general     Questions:    Total calories:      Fat restriction, if any:      Protein restriction, if any:      Na restriction, if any:      Fluid restriction:      Additional restrictions:      Remove dressing in 24 hours     VAS US Hemodialysis Access         Primary Diagnosis     Your primary diagnosis was:  Complication Of Surgical Procedure      Admission Information     Date & Time Provider Department CSN    3/6/2017  6:05 AM FARIDEH Muñoz III, MD [facility] [id number]      Care Providers     Provider Role Specialty Primary office phone    FARIDEH Muñoz III, MD Attending Provider Vascular Surgery [phone]      Your Vitals Were     BP Pulse Temp Resp Height Weight    139/77 (BP Location: Right arm, Patient Position: Lying, BP Method: Automatic) 120 98.8 °F (37.1 °C) (Oral) 16 5' 4" (1.626 m) 64.9 kg (143 lb)    SpO2 BMI             92% 24.55 kg/m2         Recent Lab Values        10/9/2015 9/8/2016                       11:25 AM  8:10 AM       "    A1C 6.0 5.1          Comment for A1C at  8:10 AM on 9/8/2016:  According to ADA guidelines, hemoglobin A1C <7.0% represents  optimal control in non-pregnant diabetic patients.  Different  metrics may apply to specific populations.   Standards of Medical Care in Diabetes - 2016.  For the purpose of screening for the presence of diabetes:  <5.7%     Consistent with the absence of diabetes  5.7-6.4%  Consistent with increasing risk for diabetes   (prediabetes)  >or=6.5%  Consistent with diabetes  Currently no consensus exists for use of hemoglobin A1C  for diagnosis of diabetes for children.        Allergies as of 3/6/2017        Reactions    Allopurinol     Other reaction(s): abnormal transaminases      Advance Directives     An advance directive is a document which, in the event you are no longer able to make decisions for yourself, tells your healthcare team what kind of treatment you do or do not want to receive, or who you would like to make those decisions for you.  If you do not currently have an advance directive, [facility] encourages you to create one.  For more information call:  [phone]), [phone] ([phone]),  or log on to [email or web address].        Language Assistance Services     ATTENTION: Language assistance services are available, free of charge. Please call [phone].      ATENCIÓN: Si habla español, tiene a bonilla disposición servicios gratuitos de asistencia lingüística. Llame al [phone].     CHÚ Ý: N?u b?n nói Ti?ng Vi?t, có các d?ch v? h? tr? ngôn ng? mi?n phí dành cho b?n. G?i s? [phone].        Chronic Kindey Disease Education              [facility] complies with applicable Federal civil rights laws and does not discriminate on the basis of race, color, national origin, age, disability, or sex.

## 2017-03-06 NOTE — H&P (VIEW-ONLY)
See my prior note; review of systems, family history and social history are   unchanged.    HISTORY OF PRESENT ILLNESS:  A 51-year-old female status post:  1. Left upper arm 4-7 tapered AV graft placement 01/12/2017.  2. Known venous anastomotic stenosis.    Of note, at the time of surgery, she was found to have exceptionally thin and   friable 4 mm brachial vein, which was used as the outflow.  She is now starting   to use the graft and used both needles for the first time yesterday.  She said   this all went well.    PAST MEDICAL HISTORY:  1. End-stage renal disease, dialyzing Tuesday, Thursday, Saturday.  2. History of herpes simplex 1.  3. Hyperparathyroidism.  4. Prediabetes.    PAST SURGICAL HISTORY:  As above.  1. Tonsillectomy.  2. Breast surgery.  3. Salpingo-oophorectomy.    FAMILY HISTORY:  Positive for diabetes.    SOCIAL HISTORY:  Nonsmoker.    MEDICATIONS:  No anticoagulants or antiplatelet agents.  See EPIC for full list.    ALLERGIES:  Allopurinol.    REVIEW OF SYSTEMS:  Denies postprandial pain or DVT.  All other systems including eyes, ENT, respiratory, , musculoskeletal, breast,   psychiatric, lymph, allergy and immune are negative.    PHYSICAL EXAMINATION:  VITAL SIGNS:  See nursing notes.  She is in no acute distress.  RESPIRATORY:  Normal effort.  Clear to auscultation.  CARDIAC:  Regular rate and rhythm, nondisplaced PMI, no murmur.  EXTREMITIES:  Left arm shows a soft thrill with really no significant   pulsatility in the AV graft.    IMAGING:  Venous outflow shows a worsening venous outflow stenosis with velocity   of 791 compared to 699 prior.  This appears to be beyond the venous   anastomosis.  Flow volume is decreased to 1.5 liters, previously 1.7.    ASSESSMENT:  Very high-grade outflow stenosis, now with falling flow rates.  I   am surprised that her graft is not more pulsatile.  This suggests to me that the   predominance of her outflow may be retrograde rather than  antegrade.    Particularly, this requires intervention given the decreased flow rates.  She is   at increased risk for vein disruption given the frailty of these veins.  We   will wait another two weeks before proceeding.    PLAN:  1. Left fistulogram and possible venous outflow intervention 03/06/2017.  2. Cath lab case.  3. May require a covered stent.    The patient understands the risks and rationale of procedure and wishes to   proceed.      TRAVIS/SIMONE  dd: 02/17/2017 12:12:19 (CST)  td: 02/18/2017 05:49:01 (CST)  Doc ID   #[id number]  Job ID #[id number]    CC:

## 2017-03-06 NOTE — PROGRESS NOTES
Pt returned to room AAOx4 and denies pain.  VSS.  L upper arm fistula is c/d/i without redness or swelling.   called to bedside.  Post procedure protocol reviewed.  Will continue to monitor.

## 2017-03-06 NOTE — OP NOTE
DATE OF PROCEDURE:  03/06/2017.    PREOPERATIVE DIAGNOSIS:  Stenosis, left AV graft.    POSTOPERATIVE DIAGNOSIS:  Stenosis, left AV graft.    PROCEDURES PERFORMED:  1.  Covered stent placement, left AV graft outflow (7 x 100 Viabahn).  2.  Angioplasty, left AV graft (7 x 80 Prentiss).  3.  Left fistulogram.  4.  Conscious sedation monitoring.    SURGEON:  MANPREET Muñoz III, M.D.    ANESTHESIA:  Monitored sedation and IV infiltration.    INDICATIONS:  A 52-year-old female with end-stage renal disease and a left AV   graft placed prior with a known very, very fragile outflow vein, who has a very   high-grade stenosis with velocities in the 690 range.    PROCEDURE IN DETAIL:  The patient was brought in the Cath Lab and placed in   supine position.  After sterile prep and drape and infiltration with 1%   lidocaine, micropuncture set was used to access the left upper arm prosthetic   graft.  Fistulogram was then performed demonstrating approximately 80% stenosis   over the first 4 to 5 cm in the outflow brachial vein.  Also, noted were   retrograde collaterals.  There was no central venous stenosis.    An 8-Italian sheath was placed over a stiff angled Glidewire.  Because of the   very fragile nature of this vessel, we thought that predilation had a   significant risk of rupture.  Therefore, a 7 x 100 Viabahn covered stent was   brought in the field, prepped, placed over this area and deployed without   difficulty.  There was marked wasting in the area of stenosis.  This was then   angioplastied with a 7 x 80 Prentiss balloon.  There was severe wasting then   finally fully effaced at 14 atmospheres.    Final completion fistulogram showed complete resolution of the stenosis and no   retrograde filling of collateral vessels.    All catheters and guidewires were removed and hemostasis was achieved with a   Monocryl suture.  I personally monitored her cardiorespiratory functions   throughout the procedure.  See nursing notes  for dosing of fentanyl and Versed.    Total conscious sedation time was 33 minutes.      TRAVIS/SIMONE  dd: 03/06/2017 08:48:17 (CST)  td: 03/06/2017 11:21:10 (CST)  Doc ID   #[id number]  Job ID #[id number]    CC:

## 2017-03-06 NOTE — INTERVAL H&P NOTE
The patient has been examined and the H&P has been reviewed:    I concur with the findings and no changes have occurred since H&P was written.    Anesthesia/Surgery risks, benefits and alternative options discussed and understood by patient/family.          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

## 2017-03-06 NOTE — PROGRESS NOTES
Pt is AAOx4 and denies pain.  VSS.  Family at bedside.  Admit questions completed.  Will continue to monitor.

## 2017-03-06 NOTE — BRIEF OP NOTE
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 Covered stent placement, L AVG (7x100 Viabaun)  2. PTA, L AVG (7x80 Reynolds)  3. L fistulagram  3. Monitored conscious sedation    Anesthesia: RN IV Sedation    Description of the findings of the procedure: high grade long outflow stenosis ~80%; no residual after stent    Findings/Key Components: as above    Estimated Blood Loss: <3cc         Specimens:   Specimen     None          Discharge Note    SUMMARY     Admit Date: 3/6/2017    Discharge Date and Time: 3/6/17    Hospital Course (synopsis of major diagnoses, care, treatment, and services provided during the course of the hospital stay): successful outpatient procedure    Final Diagnosis: AVG stenosis    Disposition: home    Follow Up/Patient Instructions: Diet: renal  Act: ad humberto  FU: 1 week with AVF duplex     Medications: pre-op

## 2017-03-14 ENCOUNTER — OFFICE VISIT (OUTPATIENT)
Dept: FAMILY MEDICINE | Facility: CLINIC | Age: 52
End: 2017-03-14
Payer: COMMERCIAL

## 2017-03-14 VITALS
HEART RATE: 106 BPM | BODY MASS INDEX: 24.49 KG/M2 | DIASTOLIC BLOOD PRESSURE: 74 MMHG | WEIGHT: 143.44 LBS | SYSTOLIC BLOOD PRESSURE: 140 MMHG | TEMPERATURE: 98 F | HEIGHT: 64 IN | OXYGEN SATURATION: 95 %

## 2017-03-14 DIAGNOSIS — I10 ESSENTIAL HYPERTENSION: ICD-10-CM

## 2017-03-14 DIAGNOSIS — N25.81 HYPERPARATHYROIDISM, SECONDARY RENAL: ICD-10-CM

## 2017-03-14 DIAGNOSIS — Z99.2 ESRD (END STAGE RENAL DISEASE) ON DIALYSIS: ICD-10-CM

## 2017-03-14 DIAGNOSIS — N18.6 ESRD (END STAGE RENAL DISEASE) ON DIALYSIS: ICD-10-CM

## 2017-03-14 DIAGNOSIS — B96.89 ACUTE BACTERIAL BRONCHITIS: Primary | ICD-10-CM

## 2017-03-14 DIAGNOSIS — J20.8 ACUTE BACTERIAL BRONCHITIS: Primary | ICD-10-CM

## 2017-03-14 PROCEDURE — 1160F RVW MEDS BY RX/DR IN RCRD: CPT | Mod: S$GLB,,, | Performed by: NURSE PRACTITIONER

## 2017-03-14 PROCEDURE — 99999 PR PBB SHADOW E&M-EST. PATIENT-LVL IV: CPT | Mod: PBBFAC,,, | Performed by: NURSE PRACTITIONER

## 2017-03-14 PROCEDURE — 99214 OFFICE O/P EST MOD 30 MIN: CPT | Mod: S$GLB,,, | Performed by: NURSE PRACTITIONER

## 2017-03-14 RX ORDER — AZITHROMYCIN 250 MG/1
TABLET, FILM COATED ORAL
Qty: 6 TABLET | Refills: 0 | Status: SHIPPED | OUTPATIENT
Start: 2017-03-14 | End: 2017-03-28 | Stop reason: ALTCHOICE

## 2017-03-14 RX ORDER — BENZONATATE 200 MG/1
200 CAPSULE ORAL 3 TIMES DAILY PRN
Qty: 30 CAPSULE | Refills: 0 | Status: SHIPPED | OUTPATIENT
Start: 2017-03-14 | End: 2017-03-24

## 2017-03-14 RX ORDER — PREDNISONE 20 MG/1
20 TABLET ORAL 2 TIMES DAILY
Qty: 10 TABLET | Refills: 0 | Status: SHIPPED | OUTPATIENT
Start: 2017-03-14 | End: 2017-03-19

## 2017-03-14 NOTE — MR AVS SNAPSHOT
[facility]  [address]  [place] [address]  Phone: [phone]  Fax: [phone]                  Eva Bloom   3/14/2017 2:40 PM   Office Visit    Description:  Female : 1965   Provider:  NAS Mckenzie   Department:  [place] - [facility]           Reason for Visit     Bronchitis           Diagnoses this Visit        Comments    Acute bacterial bronchitis    -  Primary     ESRD (end stage renal disease) on dialysis         Essential hypertension         Hyperparathyroidism, secondary renal                To Do List           Future Appointments        Provider Department Dept Phone    3/17/2017 9:00 AM LAB, HEMONC CANCER BLDG [facility] [phone]    3/17/2017 10:00 AM Ben Rivera MD [place] - Hematology Oncology [phone]    3/31/2017 8:20 AM NAS Mckenzie [facility] [phone]    6/15/2017 9:40 AM Alison Rivera MD [facility] - Urology [phone]      Goals (5 Years of Data)              9/8/16    10/9/15    HEMOGLOBIN A1C < 7.0   5.1  6.0    Related Problems    Prediabetes       These Medications        Disp Refills Start End    azithromycin (ZITHROMAX Z-STELLA) 250 MG tablet 6 tablet 0 3/14/2017     Take 2 pills day 1, then 1 pill day 2-5.    Pharmacy: [facility] [address] [place] [address] [address] [place] [address] Ph #: [phone]       benzonatate (TESSALON) 200 MG capsule 30 capsule 0 3/14/2017 3/24/2017    Take 1 capsule (200 mg total) by mouth 3 (three) times daily as needed. - Oral    Pharmacy: [facility] [address] [place] [address] [facility] Ph #: [phone]       predniSONE (DELTASONE) 20 MG tablet 10 tablet 0 3/14/2017 3/19/2017    Take 1 tablet (20 mg total) by mouth 2 (two) times daily. - Oral    Pharmacy: [facility] [address] [place] [address] [facility] Ph #: [phone]         [facility]     [facility] Nurse Care Line  - 24/7 Assistance  Registered nurses in the [facility] provide clinical advisement, health education, appointment booking, and other advisory services.  Call for this free service at [phone].             Medications           Message regarding Medications     Verify the changes and/or additions to your medication regime listed below are the same as discussed with your clinician today.  If any of these changes or additions are incorrect, please notify your healthcare provider.        START taking these NEW medications        Refills    azithromycin (ZITHROMAX Z-STELLA) 250 MG tablet 0    Sig: Take 2 pills day 1, then 1 pill day 2-5.    Class: Normal    benzonatate (TESSALON) 200 MG capsule 0    Sig: Take 1 capsule (200 mg total) by mouth 3 (three) times daily as needed.    Class: Normal    Route: Oral    predniSONE (DELTASONE) 20 MG tablet 0    Sig: Take 1 tablet (20 mg total) by mouth 2 (two) times daily.    Class: Normal    Route: Oral           Verify that the below list of medications is an accurate representation of the medications you are currently taking.  If none reported, the list may be blank. If incorrect, please contact your healthcare provider. Carry this list with you in case of emergency.           Current Medications     acetaminophen (TYLENOL) 500 MG tablet Take 500 mg by mouth every 6 (six) hours as needed for Pain.    albuterol 90 mcg/actuation inhaler Inhale 2 puffs into the lungs every 6 (six) hours as needed.    aluminum-magnesium hydroxide-simethicone (MAALOX) 200-200-20 mg/5 mL Susp Take 30 mLs by mouth every 6 (six) hours as needed.     amlodipine (NORVASC) 5 MG tablet Take 1 tablet (5 mg total) by mouth once daily.    cabozantinib 60 mg Tab Take 60 mg by mouth once daily.    febuxostat (ULORIC) 40 mg Tab Take 1 tablet (40 mg total) by mouth once daily.    lisinopril (PRINIVIL,ZESTRIL) 40 MG tablet Take 1 tablet (40 mg total) by mouth once daily.     "multivitamin-Ca-iron-minerals (ONE-A-DAY WOMENS FORMULA) 27-0.4 mg Tab Take by mouth. 1 Tablet Oral Every day    nifedipine (PROCARDIA-XL) 60 MG (OSM) 24 hr tablet Take 1 tablet (60 mg total) by mouth once daily.    ondansetron (ZOFRAN-ODT) 8 MG TbDL Take 1 tablet (8 mg total) by mouth every 12 (twelve) hours as needed.    psyllium husk-calcium (METAMUCIL PLUS CALCIUM) 1-60 gram-mg Cap Take by mouth. 2 Capsule Oral Every morning    azithromycin (ZITHROMAX Z-STELLA) 250 MG tablet Take 2 pills day 1, then 1 pill day 2-5.    benzonatate (TESSALON) 200 MG capsule Take 1 capsule (200 mg total) by mouth 3 (three) times daily as needed.    predniSONE (DELTASONE) 20 MG tablet Take 1 tablet (20 mg total) by mouth 2 (two) times daily.           Clinical Reference Information           Your Vitals Were     BP Pulse Temp Height Weight SpO2    140/74 (BP Location: Left arm, Patient Position: Sitting, BP Method: Manual) 106 98.2 °F (36.8 °C) (Oral) 5' 4" (1.626 m) 65.1 kg (143 lb 6.6 oz) 95%    BMI                24.62 kg/m2          Blood Pressure          Most Recent Value    BP  (!)  140/74      Allergies as of 3/14/2017     Allopurinol      Immunizations Administered on Date of Encounter - 3/14/2017     None      Maintenance Dialysis History     Start End Type Comments Center    1/15/2016  Peritoneal  [facility] [place]            Current Dialysis Center Information     [facility] [place] [address] [facility] [address] Phone #:  [phone]    Contact:  [place]  [address] Fax #:  [phone]            Transplant Information        Txp Date Organ Coordinator Care Team     Kidney Kinsey Carballo RN Referring Physician:  Williams Hart MD   Current Nephrologist:  Williams Hart MD         Language Assistance Services     ATTENTION: Language assistance services are available, free of charge. Please call [phone].      ATENCIÓN: Si habla español, tiene a bonilla disposición servicios gratuitos de asistencia lingüística. " Marisela murphy [phone].     KEYONNA Ý: N?u b?n nói Ti?ng Vi?t, có các d?ch v? h? tr? ngôn ng? mi?n phí dành cho b?n. G?i s? [phone].         [facility] complies with applicable Federal civil rights laws and does not discriminate on the basis of race, color, national origin, age, disability, or sex.

## 2017-03-14 NOTE — PROGRESS NOTES
Subjective:       Patient ID: Eva Bloom is a 51 y.o. female.    Chief Complaint: Bronchitis (F/U)    HPI Comments: 51-year-old female presents to the clinic today with complaint of continued coughing which is mostly dry and a slight wheeze for the past 3 weeks.  She was seen on 3/1/2017 was treated for bronchitis with Robitussin with codeine and albuterol inhaler.  She states the inhaler does help.  She states she just cannot get completely well.  She denies any fever, chills, sore throat, sinus congestion, ear pain, abdominal pain, nausea, vomiting, or diarrhea.  She denies any headaches, dizziness, or blurred vision.  She denies any cardiac chest pain, heart palpitations, or swelling to lower extremities.    Past Medical History:   Diagnosis Date    Anemia     Bronchitis 03/01/2017    Cancer 2016    kidney cancer    CMV (cytomegalovirus) antibody positive     Essential hypertension 9/23/2015    H/O herpes simplex type 2 infection     Herpes simplex type 1 antibody positive     History of kidney cancer     s/p left nephrectomy 1/2016    Hyperparathyroidism, unspecified     Hyperuricemia without signs of inflammatory arthritis and tophaceous disease     Kidney stones     LGSIL (low grade squamous intraepithelial dysplasia)     Prediabetes     Proteinuria     Thyroid nodule     Urate nephropathy      Past Surgical History:   Procedure Laterality Date    BREAST SURGERY      LUMPECTOMY    COLONOSCOPY N/A 11/12/2015    NEPHRECTOMY-LAPAROSCOPIC Left 01/12/2016    PERITONEAL CATHETER INSERTION      Permacath insertion  01/12/2017    SALPINGOOPHORECTOMY Right 2016    [facility]---DAVINCI    TONSILLECTOMY      TUBAL LIGATION        reports that she has never smoked. She has never used smokeless tobacco. She reports that she does not drink alcohol or use illicit drugs.  Review of Systems   Constitutional: Negative for chills and fever.   HENT: Negative for congestion, ear discharge, ear pain,  postnasal drip, rhinorrhea, sinus pressure, sneezing and sore throat.    Eyes: Negative for pain, discharge and itching.   Respiratory: Positive for cough and wheezing. Negative for shortness of breath.    Cardiovascular: Negative for chest pain, palpitations and leg swelling.   Gastrointestinal: Negative for abdominal pain, diarrhea, nausea and vomiting.   Musculoskeletal: Negative for gait problem.   Neurological: Negative for dizziness, light-headedness and headaches.       Objective:      Physical Exam   Constitutional: She is oriented to person, place, and time. She appears well-developed and well-nourished. No distress.   HENT:   Head: Normocephalic and atraumatic.   Right Ear: External ear normal.   Left Ear: External ear normal.   Nose: Nose normal.   Mouth/Throat: Oropharynx is clear and moist. No oropharyngeal exudate.   Eyes: Conjunctivae and EOM are normal. Pupils are equal, round, and reactive to light. Right eye exhibits no discharge. Left eye exhibits no discharge. No scleral icterus.   Neck: Normal range of motion. Neck supple. No JVD present.   Cardiovascular: Normal rate, regular rhythm and normal heart sounds.  Exam reveals no gallop and no friction rub.    No murmur heard.  Pulmonary/Chest: Effort normal and breath sounds normal. No respiratory distress. She has no wheezes. She has no rales.   Congested cough    Abdominal: Soft. Bowel sounds are normal. There is no tenderness. There is no rebound and no guarding.   Musculoskeletal: Normal range of motion. She exhibits no edema.   Lymphadenopathy:     She has no cervical adenopathy.   Neurological: She is alert and oriented to person, place, and time.   Skin: Skin is warm and dry. She is not diaphoretic.   Psychiatric: She has a normal mood and affect.       Assessment:       1. Acute bacterial bronchitis    2. ESRD (end stage renal disease) on dialysis    3. Essential hypertension    4. Hyperparathyroidism, secondary renal        Plan:          Acute bacterial bronchitis  - continue Albuterol inhaler as needed    ESRD (end stage renal disease) on dialysis  - on dialysis    Essential hypertension  - The current medical regimen is effective;  continue present plan and medications.  - much improved since Amlodipine was added    Hyperparathyroidism, secondary renal  - stable observe followed by nephrology     Other orders  -     azithromycin (ZITHROMAX Z-STELLA) 250 MG tablet; Take 2 pills day 1, then 1 pill day 2-5.  Dispense: 6 tablet; Refill: 0  -     benzonatate (TESSALON) 200 MG capsule; Take 1 capsule (200 mg total) by mouth 3 (three) times daily as needed.  Dispense: 30 capsule; Refill: 0  -     predniSONE (DELTASONE) 20 MG tablet; Take 1 tablet (20 mg total) by mouth 2 (two) times daily.  Dispense: 10 tablet; Refill: 0

## 2017-03-16 DIAGNOSIS — I12.9 HYPERTENSIVE KIDNEY DISEASE WITH CHRONIC KIDNEY DISEASE, STAGE 1-4 OR UNSPECIFIED CHRONIC KIDNEY DISEASE: ICD-10-CM

## 2017-03-16 RX ORDER — NIFEDIPINE 90 MG/1
90 TABLET, EXTENDED RELEASE ORAL DAILY
Qty: 30 TABLET | Refills: 5 | Status: SHIPPED | OUTPATIENT
Start: 2017-03-16 | End: 2017-05-10 | Stop reason: ALTCHOICE

## 2017-03-17 ENCOUNTER — TELEPHONE (OUTPATIENT)
Dept: VASCULAR SURGERY | Facility: CLINIC | Age: 52
End: 2017-03-17

## 2017-03-17 ENCOUNTER — LAB VISIT (OUTPATIENT)
Dept: LAB | Facility: HOSPITAL | Age: 52
End: 2017-03-17
Attending: INTERNAL MEDICINE
Payer: COMMERCIAL

## 2017-03-17 ENCOUNTER — OFFICE VISIT (OUTPATIENT)
Dept: HEMATOLOGY/ONCOLOGY | Facility: CLINIC | Age: 52
End: 2017-03-17
Payer: COMMERCIAL

## 2017-03-17 VITALS
RESPIRATION RATE: 18 BRPM | BODY MASS INDEX: 24.6 KG/M2 | SYSTOLIC BLOOD PRESSURE: 167 MMHG | DIASTOLIC BLOOD PRESSURE: 94 MMHG | TEMPERATURE: 98 F | HEART RATE: 113 BPM | WEIGHT: 143.31 LBS

## 2017-03-17 DIAGNOSIS — Z51.11 ENCOUNTER FOR CHEMOTHERAPY MANAGEMENT: ICD-10-CM

## 2017-03-17 DIAGNOSIS — Z99.2 ESRD ON HEMODIALYSIS: ICD-10-CM

## 2017-03-17 DIAGNOSIS — Z90.5 S/P NEPHRECTOMY: ICD-10-CM

## 2017-03-17 DIAGNOSIS — D69.59 CHEMOTHERAPY-INDUCED THROMBOCYTOPENIA: ICD-10-CM

## 2017-03-17 DIAGNOSIS — N18.6 ESRD ON HEMODIALYSIS: ICD-10-CM

## 2017-03-17 DIAGNOSIS — Z90.721 HISTORY OF RIGHT OOPHORECTOMY: ICD-10-CM

## 2017-03-17 DIAGNOSIS — C78.6 MALIGNANT NEOPLASM METASTATIC TO PERITONEUM: ICD-10-CM

## 2017-03-17 DIAGNOSIS — N18.6 ANEMIA IN ESRD (END-STAGE RENAL DISEASE): ICD-10-CM

## 2017-03-17 DIAGNOSIS — E04.1 THYROID NODULE: ICD-10-CM

## 2017-03-17 DIAGNOSIS — T45.1X5A CHEMOTHERAPY INDUCED NAUSEA AND VOMITING: ICD-10-CM

## 2017-03-17 DIAGNOSIS — R11.2 CHEMOTHERAPY INDUCED NAUSEA AND VOMITING: ICD-10-CM

## 2017-03-17 DIAGNOSIS — T45.1X5A CHEMOTHERAPY-INDUCED THROMBOCYTOPENIA: ICD-10-CM

## 2017-03-17 DIAGNOSIS — D63.1 ANEMIA IN ESRD (END-STAGE RENAL DISEASE): ICD-10-CM

## 2017-03-17 DIAGNOSIS — I12.9 HYPERTENSIVE KIDNEY DISEASE WITH CHRONIC KIDNEY DISEASE, STAGE 1-4 OR UNSPECIFIED CHRONIC KIDNEY DISEASE: ICD-10-CM

## 2017-03-17 DIAGNOSIS — C64.2 METASTATIC RENAL CELL CARCINOMA, LEFT: ICD-10-CM

## 2017-03-17 DIAGNOSIS — C64.2 METASTATIC RENAL CELL CARCINOMA, LEFT: Primary | ICD-10-CM

## 2017-03-17 LAB
ALBUMIN SERPL BCP-MCNC: 3.1 G/DL
ALP SERPL-CCNC: 61 U/L
ALT SERPL W/O P-5'-P-CCNC: 19 U/L
ANION GAP SERPL CALC-SCNC: 14 MMOL/L
AST SERPL-CCNC: 23 U/L
BILIRUB SERPL-MCNC: 0.7 MG/DL
BUN SERPL-MCNC: 31 MG/DL
CALCIUM SERPL-MCNC: 9.9 MG/DL
CHLORIDE SERPL-SCNC: 100 MMOL/L
CO2 SERPL-SCNC: 27 MMOL/L
CREAT SERPL-MCNC: 8.3 MG/DL
ERYTHROCYTE [DISTWIDTH] IN BLOOD BY AUTOMATED COUNT: 18.5 %
EST. GFR  (AFRICAN AMERICAN): 5.8 ML/MIN/1.73 M^2
EST. GFR  (NON AFRICAN AMERICAN): 5 ML/MIN/1.73 M^2
GLUCOSE SERPL-MCNC: 115 MG/DL
HCT VFR BLD AUTO: 36.9 %
HGB BLD-MCNC: 11.2 G/DL
MCH RBC QN AUTO: 28.4 PG
MCHC RBC AUTO-ENTMCNC: 30.4 %
MCV RBC AUTO: 93 FL
NEUTROPHILS # BLD AUTO: 4.3 K/UL
PLATELET # BLD AUTO: 291 K/UL
PMV BLD AUTO: 10 FL
POTASSIUM SERPL-SCNC: 4.1 MMOL/L
PROT SERPL-MCNC: 7.2 G/DL
RBC # BLD AUTO: 3.95 M/UL
SODIUM SERPL-SCNC: 141 MMOL/L
WBC # BLD AUTO: 6.63 K/UL

## 2017-03-17 PROCEDURE — 99999 PR PBB SHADOW E&M-EST. PATIENT-LVL III: CPT | Mod: PBBFAC,,, | Performed by: INTERNAL MEDICINE

## 2017-03-17 PROCEDURE — 1160F RVW MEDS BY RX/DR IN RCRD: CPT | Mod: S$GLB,,, | Performed by: INTERNAL MEDICINE

## 2017-03-17 PROCEDURE — 99214 OFFICE O/P EST MOD 30 MIN: CPT | Mod: S$GLB,,, | Performed by: INTERNAL MEDICINE

## 2017-03-17 PROCEDURE — 36415 COLL VENOUS BLD VENIPUNCTURE: CPT

## 2017-03-17 PROCEDURE — 80053 COMPREHEN METABOLIC PANEL: CPT

## 2017-03-17 PROCEDURE — 85027 COMPLETE CBC AUTOMATED: CPT

## 2017-03-17 NOTE — TELEPHONE ENCOUNTER
----- Message from Evelia Jackson sent at 3/17/2017  4:08 PM CDT -----  [phone]//pt states that she needs to schedule an appt to have her catherer removed//please call//thank you

## 2017-03-17 NOTE — PROGRESS NOTES
"Subjective:       Patient ID: Eva Bloom is a 51 y.o. female.    Chief Complaint: Metastatic renal cell carcinoma and Results (labs)    HPI   51 year old female to clinic for 4 week follow up and to review labs for recurrent metastatic renal cell carcinoma.Pt is currently taking cabozantinib daily.  Pt reports improvement in fatigue and shortness of breath. She denies any nausea or vomiting. She continues to note some decreased appetite and altered taste sensation. Pt doing dialysis three times weekly.    Today, she denies any mouth sores, nausea, vomiting, diarrhea, constipation,weight loss, chest pain, shortness of breath, leg swelling, headache, dizziness, or mood changes.    ECOG 2. She is accompanied alone to clinic.    Oncologic History (From Chart and Patient):  Eva Bloom is a 51 y.o. female with ESRD on HD who was found to have two L renal masses. She underwent L lap nephrectomy on 1/12/16. 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 Margins were negative.  Shehealed from surgery well, but then developed a large ovarian mass.  This was removed by gyn along with multiple sites of metastatic disease in the pelvis.  Path was c/w recurrence of RCC.     11/20/16 Pelvic ultrasound reveals "Large heterogeneous cystic and solid mass which appears to arise from the right ovary with worrisome imaging features for malignancy.  Differential diagnosis includes malignant tumors such as serous or mucinous cystadenocarcinoma.  It is important to note that the patient is at risk for ovarian torsion due to the size of the mass.Multiple uterine fibroids are identified with the largest in the uterine fundus."    11/22/16 pathology reveals "FINAL PATHOLOGIC DIAGNOSIS-RIGHT OVARY AND FALLOPIAN TUBE, RIGHT SALPINGO-OOPHORECTOMY:  -Positive for malignancy, high grade carcinoma morphologically and " "immunohistochemically consistent with metastasis from the patient's known renal cell carcinoma"    1/23/17 MRI abdomen reveals "Status post left nephrectomy, the left nephrectomy bed appears normal.There is a small liver lesion and a small splenic lesion, they cannot be fully characterized but they are unchanged from 5/27/16 suggestive of benign lesions.Multiple right kidney cysts.Cholelithiasis.Soft tissue oval density noted within the mesentery adjacent to the aorta right iliac bifurcation, nonspecific, could represent an enlarged lymph node, it is unchanged from prior studies"    1/23/17- CT Chest reveals "Stable pulmonary nodules, likely benign. No new pulmonary parenchymal abnormalities are identified. Enlarging nodular foci in partially visualized left upper abdomen. Peritoneal implants not excluded. Multiple thyroid nodules, measuring up to 1.6 cm."    Review of Systems   Constitutional: Positive for appetite change and fatigue. Negative for activity change, chills and fever.   HENT: Negative for congestion, hearing loss, mouth sores, sore throat, tinnitus and voice change.    Eyes: Negative for pain and visual disturbance.   Respiratory: Positive for shortness of breath (with exertion). Negative for cough and wheezing.    Cardiovascular: Negative for chest pain, palpitations and leg swelling.   Gastrointestinal: Negative for abdominal pain, constipation, diarrhea, nausea and vomiting.   Endocrine: Negative for cold intolerance and heat intolerance.   Genitourinary: Negative for difficulty urinating, dyspareunia, dysuria, frequency, menstrual problem, urgency, vaginal bleeding, vaginal discharge and vaginal pain.   Musculoskeletal: Negative for arthralgias and myalgias.   Skin: Negative for color change, rash and wound.   Allergic/Immunologic: Negative for environmental allergies and food allergies.   Neurological: Negative for weakness, numbness and headaches.   Hematological: Negative for adenopathy. Does " not bruise/bleed easily.   Psychiatric/Behavioral: Negative for agitation, confusion, hallucinations and sleep disturbance. The patient is not nervous/anxious.    All other systems reviewed and are negative.      Allergies:  Review of patient's allergies indicates:   Allergen Reactions    Allopurinol      Other reaction(s): abnormal transaminases       Medications:  Current Outpatient Prescriptions   Medication Sig Dispense Refill    acetaminophen (TYLENOL) 500 MG tablet Take 500 mg by mouth every 6 (six) hours as needed for Pain.      albuterol 90 mcg/actuation inhaler Inhale 2 puffs into the lungs every 6 (six) hours as needed. 1 Inhaler 0    aluminum-magnesium hydroxide-simethicone (MAALOX) 200-200-20 mg/5 mL Susp Take 30 mLs by mouth every 6 (six) hours as needed.       azithromycin (ZITHROMAX Z-STELLA) 250 MG tablet Take 2 pills day 1, then 1 pill day 2-5. 6 tablet 0    benzonatate (TESSALON) 200 MG capsule Take 1 capsule (200 mg total) by mouth 3 (three) times daily as needed. 30 capsule 0    cabozantinib 60 mg Tab Take 60 mg by mouth once daily. 30 tablet 5    febuxostat (ULORIC) 40 mg Tab Take 1 tablet (40 mg total) by mouth once daily. 30 tablet 4    lisinopril (PRINIVIL,ZESTRIL) 40 MG tablet Take 1 tablet (40 mg total) by mouth once daily. 30 tablet 1    multivitamin-Ca-iron-minerals (ONE-A-DAY WOMENS FORMULA) 27-0.4 mg Tab Take by mouth. 1 Tablet Oral Every day      nifedipine (PROCARDIA-XL) 90 MG (OSM) TR24 Take 1 tablet (90 mg total) by mouth once daily. 30 tablet 5    ondansetron (ZOFRAN-ODT) 8 MG TbDL Take 1 tablet (8 mg total) by mouth every 12 (twelve) hours as needed. 30 tablet 1    predniSONE (DELTASONE) 20 MG tablet Take 1 tablet (20 mg total) by mouth 2 (two) times daily. 10 tablet 0    psyllium husk-calcium (METAMUCIL PLUS CALCIUM) 1-60 gram-mg Cap Take by mouth. 2 Capsule Oral Every morning       No current facility-administered medications for this visit.        PMH:  Past Medical  History:   Diagnosis Date    Anemia     Bronchitis 03/01/2017    Cancer 2016    kidney cancer    CMV (cytomegalovirus) antibody positive     Essential hypertension 9/23/2015    H/O herpes simplex type 2 infection     Herpes simplex type 1 antibody positive     History of kidney cancer     s/p left nephrectomy 1/2016    Hyperparathyroidism, unspecified     Hyperuricemia without signs of inflammatory arthritis and tophaceous disease     Kidney stones     LGSIL (low grade squamous intraepithelial dysplasia)     Prediabetes     Proteinuria     Thyroid nodule     Urate nephropathy        PSH:  Past Surgical History:   Procedure Laterality Date    BREAST SURGERY      LUMPECTOMY    COLONOSCOPY N/A 11/12/2015    NEPHRECTOMY-LAPAROSCOPIC Left 01/12/2016    PERITONEAL CATHETER INSERTION      Permacath insertion  01/12/2017    SALPINGOOPHORECTOMY Right 2016    [facility]---DAVINCI    TONSILLECTOMY      TUBAL LIGATION         FamHx:  Family History   Problem Relation Age of Onset    Hypertension Mother     Diabetes Father     Kidney disease Father     Diabetes Maternal Grandfather     No Known Problems Sister     No Known Problems Brother     Heart disease Sister     No Known Problems Sister     No Known Problems Brother     Breast cancer Neg Hx     Colon cancer Neg Hx     Cancer Neg Hx     Stroke Neg Hx        SocHx:  Social History     Social History    Marital status:      Spouse name: N/A    Number of children: 2    Years of education: N/A     Occupational History    Yillio #[id number]     Social History Main Topics    Smoking status: Never Smoker    Smokeless tobacco: Never Used    Alcohol use No      Comment: . 2 children. works at [employer].    Drug use: No    Sexual activity: Yes     Partners: Male     Other Topics Concern    Not on file     Social History Narrative         Objective:      Physical Exam   Constitutional: She is oriented to person, place, and  time. She appears well-developed and well-nourished. No distress.   HENT:   Head: Normocephalic and atraumatic.   Nose: Nose normal.   Mouth/Throat: Oropharynx is clear and moist. No oropharyngeal exudate.   Eyes: Conjunctivae and EOM are normal. Pupils are equal, round, and reactive to light. Right eye exhibits no discharge. Left eye exhibits no discharge.   Neck: Normal range of motion. Neck supple. No tracheal deviation present.   Cardiovascular: Normal rate, regular rhythm and normal heart sounds.  Exam reveals no gallop and no friction rub.    No murmur heard.  Pulmonary/Chest: Effort normal and breath sounds normal. No respiratory distress. She has no wheezes. She has no rales.   Abdominal: Soft. Bowel sounds are normal. She exhibits no distension. There is no tenderness. There is no rebound and no guarding.   Genitourinary: No breast tenderness, discharge or bleeding.   Musculoskeletal: Normal range of motion. She exhibits no edema or tenderness.   Neurological: She is alert and oriented to person, place, and time. She has normal reflexes. No cranial nerve deficit. Coordination normal.   Skin: Skin is warm and dry. No rash noted. She is not diaphoretic. No erythema. No pallor.   Psychiatric: She has a normal mood and affect. Her behavior is normal. Thought content normal.   Nursing note and vitals reviewed.      LABS:  WBC   Date Value Ref Range Status   03/17/2017 6.63 3.90 - 12.70 K/uL Final     Hemoglobin   Date Value Ref Range Status   03/17/2017 11.2 (L) 12.0 - 16.0 g/dL Final     Hematocrit   Date Value Ref Range Status   03/17/2017 36.9 (L) 37.0 - 48.5 % Final     Platelets   Date Value Ref Range Status   03/17/2017 291 150 - 350 K/uL Final       Chemistry        Component Value Date/Time     03/17/2017 0840    K 4.1 03/17/2017 0840     03/17/2017 0840    CO2 27 03/17/2017 0840    BUN 31 (H) 03/17/2017 0840    CREATININE 8.3 (H) 03/17/2017 0840     (H) 03/17/2017 0840       "  Component Value Date/Time    CALCIUM 9.9 03/17/2017 0840    ALKPHOS 61 03/17/2017 0840    AST 23 03/17/2017 0840    ALT 19 03/17/2017 0840    BILITOT 0.7 03/17/2017 0840        1/23/17 MRI abdomen reveals "Status post left nephrectomy, the left nephrectomy bed appears normal.There is a small liver lesion and a small splenic lesion, they cannot be fully characterized but they are unchanged from 5/27/16 suggestive of benign lesions.Multiple right kidney cysts.Cholelithiasis.Soft tissue oval density noted within the mesentery adjacent to the aorta right iliac bifurcation, nonspecific, could represent an enlarged lymph node, it is unchanged from prior studies"    1/23/17- CT Chest reveals "Stable pulmonary nodules, likely benign. No new pulmonary parenchymal abnormalities are identified. Enlarging nodular foci in partially visualized left upper abdomen. Peritoneal implants not excluded. Multiple thyroid nodules, measuring up to 1.6 cm."    Assessment:       1. Metastatic renal cell carcinoma, left    2. Malignant neoplasm metastatic to peritoneum    3. History of right oophorectomy    4. S/p nephrectomy left    5. Encounter for chemotherapy management    6. Chemotherapy-induced thrombocytopenia    7. Anemia in ESRD (end-stage renal disease)    8. ESRD on hemodialysis    9. Hypertensive kidney disease with chronic kidney disease, stage 1-4 or unspecified chronic kidney disease    10. Chemotherapy induced nausea and vomiting    11. Thyroid nodule        Plan:     1,2,3,4,5- Metastatic Renal Cell Carcinoma:    - It is unlikely that surgery removed all sites of metastatic disease. Recent restaging scans show no reoccurrence.  Given that this is c/w her previous papillary disease, pt started on oral cabozantinib 60mg daily (for dual VEGF and MET inhibition), especially given recent data that shows cabo is far superior to student in the first line setting.  If pt progresses on this regimen, will switch to nivolumab.     Pt " tolerating cabozantinib relatively well. RTC in 4 weeks with scans, labs (CBC,CMP) and to see Dr. Rivera.     7,8- Improved with Procit. Pt informed she should not receive Procrit if her h&h is greater than 10 an 30.    9-Elevated today. Will monitor. Pt reports compliance with meds.    10- Stable with meds.    11- Had thyroid US and biopsy last year, which was negative. Will monitor for now.      Patient was also seen and examined by Dr. Rivera. Patient is in agreement with the proposed treatment plan. All questions were answered to the patient's satisfaction. Pt knows to call clinic if anything is needed before the next clinic visit.    NAS [facility]  Hematology and Medical Oncology        I have reviewed the notes, assessments, and/or procedures performed by the housestaff, as above.  I have personally interviewed and examined the patient at the beside, and rounded with the housestaff. I concur with her/his assessment and plan and the documentation of Eva Bloom.  More than 25 mins were spent during this encounter, greater than 50% was spent in direct counseling and/or coordination of care.     Ben Rivera M.D., M.S., F.A.C.P.  Hematology/Oncology Attending  [facility]

## 2017-03-17 NOTE — Clinical Note
RTC in 4 weeks to see Dr. Rivera with CT chest/MRI abdomen and labs (CBC,CMP) done prior on the [facility] [place], [place].

## 2017-03-22 ENCOUNTER — TELEPHONE (OUTPATIENT)
Dept: VASCULAR SURGERY | Facility: CLINIC | Age: 52
End: 2017-03-22

## 2017-03-22 NOTE — TELEPHONE ENCOUNTER
----- Message from Evelia Jackson sent at 3/21/2017  9:39 AM CDT -----  Contact: sharon/[facility] [place]  [phone]//caller states that pt needs to schedule an appt to remove catheter//please call//thank you

## 2017-03-28 ENCOUNTER — OFFICE VISIT (OUTPATIENT)
Dept: FAMILY MEDICINE | Facility: CLINIC | Age: 52
End: 2017-03-28
Payer: COMMERCIAL

## 2017-03-28 VITALS
OXYGEN SATURATION: 93 % | TEMPERATURE: 99 F | HEIGHT: 64 IN | DIASTOLIC BLOOD PRESSURE: 80 MMHG | SYSTOLIC BLOOD PRESSURE: 150 MMHG | WEIGHT: 141.88 LBS | BODY MASS INDEX: 24.22 KG/M2 | HEART RATE: 103 BPM

## 2017-03-28 DIAGNOSIS — N18.6 ESRD (END STAGE RENAL DISEASE) ON DIALYSIS: ICD-10-CM

## 2017-03-28 DIAGNOSIS — Z99.2 ESRD (END STAGE RENAL DISEASE) ON DIALYSIS: ICD-10-CM

## 2017-03-28 DIAGNOSIS — R06.02 SOB (SHORTNESS OF BREATH): Primary | ICD-10-CM

## 2017-03-28 DIAGNOSIS — I10 ESSENTIAL HYPERTENSION: ICD-10-CM

## 2017-03-28 DIAGNOSIS — R06.2 WHEEZING: ICD-10-CM

## 2017-03-28 DIAGNOSIS — N25.81 HYPERPARATHYROIDISM, SECONDARY RENAL: ICD-10-CM

## 2017-03-28 PROCEDURE — 99214 OFFICE O/P EST MOD 30 MIN: CPT | Mod: S$GLB,,, | Performed by: NURSE PRACTITIONER

## 2017-03-28 PROCEDURE — 1160F RVW MEDS BY RX/DR IN RCRD: CPT | Mod: S$GLB,,, | Performed by: NURSE PRACTITIONER

## 2017-03-28 PROCEDURE — 99999 PR PBB SHADOW E&M-EST. PATIENT-LVL III: CPT | Mod: PBBFAC,,, | Performed by: NURSE PRACTITIONER

## 2017-03-28 RX ORDER — FLUTICASONE PROPIONATE AND SALMETEROL 100; 50 UG/1; UG/1
1 POWDER RESPIRATORY (INHALATION) 2 TIMES DAILY
Qty: 1 EACH | Refills: 0 | Status: SHIPPED | OUTPATIENT
Start: 2017-03-28 | End: 2017-04-07

## 2017-03-28 RX ORDER — ALBUTEROL SULFATE 90 UG/1
2 AEROSOL, METERED RESPIRATORY (INHALATION) EVERY 6 HOURS PRN
Qty: 1 INHALER | Refills: 0 | Status: SHIPPED | OUTPATIENT
Start: 2017-03-28 | End: 2017-04-07

## 2017-03-28 NOTE — MR AVS SNAPSHOT
[facility]  [address]  [place] [address]  Phone: [phone]  Fax: [phone]                  Eva Bloom   3/28/2017 11:00 AM   Office Visit    Description:  Female : 1965   Provider:  NAS Mckenzie   Department:  [facility]           Reason for Visit     Shortness of Breath           Diagnoses this Visit        Comments    SOB (shortness of breath)    -  Primary     Wheezing         ESRD (end stage renal disease) on dialysis         Essential hypertension         Hyperparathyroidism, secondary renal                To Do List           Future Appointments        Provider Department Dept Phone    3/31/2017 7:30 AM VASCULAR, LAB [facility] - Vascular Laboratory [phone]    3/31/2017 8:45 AM MD Tremayne Stearns III [facility] - Vascular Surgery [phone]    2017 8:30 AM [facility] MRI1 [facility] [phone]    2017 10:30 AM [facility] CT2 LIMIT 500 LBS [facility] [phone]    2017 9:00 AM LAB, HEMONC CANCER BLDG [facility] [phone]      Goals (5 Years of Data)              9/8/16    10/9/15    HEMOGLOBIN A1C < 7.0   5.1  6.0    Related Problems    Prediabetes       These Medications        Disp Refills Start End    albuterol 90 mcg/actuation inhaler 1 Inhaler 0 3/28/2017     Inhale 2 puffs into the lungs every 6 (six) hours as needed. - Inhalation    Pharmacy: [email or web address]  - [place] ([place], [place] - [address] Ph #: [phone]       fluticasone-salmeterol 100-50 mcg/dose (ADVAIR) 100-50 mcg/dose diskus inhaler 1 each 0 3/28/2017 3/28/2018    Inhale 1 puff into the lungs 2 (two) times daily. Controller - Inhalation    Pharmacy: [facility] - [place] ([place], [place] - [address] Ph #: [phone]         [facility] On Call     [facility] On Call Nurse Care Line -  Assistance  Registered nurses in the [facility] provide  clinical advisement, health education, appointment booking, and other advisory services.  Call for this free service at [phone].             Medications           Message regarding Medications     Verify the changes and/or additions to your medication regime listed below are the same as discussed with your clinician today.  If any of these changes or additions are incorrect, please notify your healthcare provider.        START taking these NEW medications        Refills    fluticasone-salmeterol 100-50 mcg/dose (ADVAIR) 100-50 mcg/dose diskus inhaler 0    Sig: Inhale 1 puff into the lungs 2 (two) times daily. Controller    Class: Normal    Route: Inhalation      STOP taking these medications     azithromycin (ZITHROMAX Z-STELLA) 250 MG tablet Take 2 pills day 1, then 1 pill day 2-5.           Verify that the below list of medications is an accurate representation of the medications you are currently taking.  If none reported, the list may be blank. If incorrect, please contact your healthcare provider. Carry this list with you in case of emergency.           Current Medications     acetaminophen (TYLENOL) 500 MG tablet Take 500 mg by mouth every 6 (six) hours as needed for Pain.    albuterol 90 mcg/actuation inhaler Inhale 2 puffs into the lungs every 6 (six) hours as needed.    aluminum-magnesium hydroxide-simethicone (MAALOX) 200-200-20 mg/5 mL Susp Take 30 mLs by mouth every 6 (six) hours as needed.     cabozantinib 60 mg Tab Take 60 mg by mouth once daily.    febuxostat (ULORIC) 40 mg Tab Take 1 tablet (40 mg total) by mouth once daily.    lisinopril (PRINIVIL,ZESTRIL) 40 MG tablet Take 1 tablet (40 mg total) by mouth once daily.    multivitamin-Ca-iron-minerals (ONE-A-DAY WOMENS FORMULA) 27-0.4 mg Tab Take by mouth. 1 Tablet Oral Every day    nifedipine (PROCARDIA-XL) 90 MG (OSM) TR24 Take 1 tablet (90 mg total) by mouth once daily.    ondansetron (ZOFRAN-ODT) 8 MG TbDL Take 1 tablet (8 mg total) by mouth  "every 12 (twelve) hours as needed.    psyllium husk-calcium (METAMUCIL PLUS CALCIUM) 1-60 gram-mg Cap Take by mouth. 2 Capsule Oral Every morning    fluticasone-salmeterol 100-50 mcg/dose (ADVAIR) 100-50 mcg/dose diskus inhaler Inhale 1 puff into the lungs 2 (two) times daily. Controller           Clinical Reference Information           Your Vitals Were     BP Pulse Temp Height Weight SpO2    150/80 (BP Location: Right arm, Patient Position: Sitting, BP Method: Manual) 103 98.6 °F (37 °C) (Oral) 5' 4" (1.626 m) 64.4 kg (141 lb 13.9 oz) 93%    BMI                24.35 kg/m2          Blood Pressure          Most Recent Value    BP  (!)  150/80      Allergies as of 3/28/2017     Allopurinol      Immunizations Administered on Date of Encounter - 3/28/2017     None      Maintenance Dialysis History     Start End Type Comments Center    1/15/2016  Peritoneal  [facility]            Current Dialysis Center Information     [facility] [place] [address] [facility] [address] Phone #:  [phone]    Contact:  N/A [place]  [address] Fax #:  [phone]            Transplant Information        Txp Date Organ Coordinator Care Team     Kidney Kinsey Carballo RN Referring Physician:  Williams Hart MD   Current Nephrologist:  Williams Hart MD         Language Assistance Services     ATTENTION: Language assistance services are available, free of charge. Please call [phone].      ATENCIÓN: Si habla español, tiene a bonilla disposición servicios gratuitos de asistencia lingüística. Llame al [phone].     CHÚ Ý: N?u b?n nói Ti?ng Vi?t, có các d?ch v? h? tr? ngôn ng? mi?n phí dành cho b?n. G?i s? [phone].         [facility] complies with applicable Federal civil rights laws and does not discriminate on the basis of race, color, national origin, age, disability, or sex.        "

## 2017-03-28 NOTE — PROGRESS NOTES
Subjective:       Patient ID: Eva Bloom is a 51 y.o. female.    Chief Complaint: Shortness of Breath    HPI Comments: 51-year-old female presents to the clinic today for shortness of breath still since she was diagnosed with bronchitis but not as bad and thinks the Albuterol inhaler helps. She says she has a slight cough.  She says her albuterol inhaler helps but she is out of it.  She was seen on 3/14/2017 and treated with albuterol, Z-Lon, Tessalon, and prednisone.  She had dialysis this morning.  She denies any fever, chills, sore throat, sinus congestion, ear pain, abdominal pain, nausea, vomiting, or diarrhea.  She denies any cardiac chest pain, heart palpitations, or swelling to lower extremities.    Past Medical History:   Diagnosis Date    Anemia     Bronchitis 03/01/2017    Cancer 2016    kidney cancer    CMV (cytomegalovirus) antibody positive     Essential hypertension 9/23/2015    H/O herpes simplex type 2 infection     Herpes simplex type 1 antibody positive     History of kidney cancer     s/p left nephrectomy 1/2016    Hyperparathyroidism, unspecified     Hyperuricemia without signs of inflammatory arthritis and tophaceous disease     Kidney stones     LGSIL (low grade squamous intraepithelial dysplasia)     Prediabetes     Proteinuria     Thyroid nodule     Urate nephropathy      Past Surgical History:   Procedure Laterality Date    BREAST SURGERY      LUMPECTOMY    COLONOSCOPY N/A 11/12/2015    NEPHRECTOMY-LAPAROSCOPIC Left 01/12/2016    PERITONEAL CATHETER INSERTION      Permacath insertion  01/12/2017    SALPINGOOPHORECTOMY Right 2016    KJB---DAVINCI    TONSILLECTOMY      TUBAL LIGATION        reports that she has never smoked. She has never used smokeless tobacco. She reports that she does not drink alcohol or use illicit drugs.  Review of Systems   Constitutional: Negative for chills and fever.   HENT: Negative for congestion, ear discharge, ear pain, postnasal  drip, rhinorrhea, sinus pressure and sore throat.    Eyes: Negative for pain, discharge and itching.   Respiratory: Positive for shortness of breath. Negative for cough and wheezing.    Cardiovascular: Negative for chest pain, palpitations and leg swelling.   Gastrointestinal: Negative for abdominal pain, diarrhea, nausea and vomiting.   Musculoskeletal: Negative for gait problem.   Neurological: Negative for dizziness, light-headedness and headaches.       Objective:      Physical Exam   Constitutional: She is oriented to person, place, and time. She appears well-developed and well-nourished. No distress.   HENT:   Head: Normocephalic and atraumatic.   Right Ear: External ear normal.   Left Ear: External ear normal.   Nose: Nose normal.   Mouth/Throat: Oropharynx is clear and moist. No oropharyngeal exudate.   Eyes: Conjunctivae and EOM are normal. Pupils are equal, round, and reactive to light. Right eye exhibits no discharge. Left eye exhibits no discharge. No scleral icterus.   Neck: Normal range of motion. Neck supple.   Cardiovascular: Normal rate, regular rhythm and normal heart sounds.  Exam reveals no gallop and no friction rub.    No murmur heard.  Pulmonary/Chest: Effort normal and breath sounds normal.   Congested cough noted   Abdominal: Soft. Bowel sounds are normal. There is no tenderness. There is no rebound and no guarding.   Musculoskeletal: Normal range of motion. She exhibits no edema.   Lymphadenopathy:     She has no cervical adenopathy.   Neurological: She is alert and oriented to person, place, and time.   Skin: Skin is warm and dry. She is not diaphoretic.   Psychiatric: She has a normal mood and affect.       Assessment:       1. SOB (shortness of breath)    2. Wheezing    3. ESRD (end stage renal disease) on dialysis    4. Essential hypertension    5. Hyperparathyroidism, secondary renal        Plan:         SOB (shortness of breath)  -     albuterol 90 mcg/actuation inhaler; Inhale 2  puffs into the lungs every 6 (six) hours as needed.  Dispense: 1 Inhaler; Refill: 0  -     fluticasone-salmeterol 100-50 mcg/dose (ADVAIR) 100-50 mcg/dose diskus inhaler; Inhale 1 puff into the lungs 2 (two) times daily. Controller  Dispense: 1 each; Refill: 0    Wheezing  -     albuterol 90 mcg/actuation inhaler; Inhale 2 puffs into the lungs every 6 (six) hours as needed.  Dispense: 1 Inhaler; Refill: 0  -     fluticasone-salmeterol 100-50 mcg/dose (ADVAIR) 100-50 mcg/dose diskus inhaler; Inhale 1 puff into the lungs 2 (two) times daily. Controller  Dispense: 1 each; Refill: 0    ESRD (end stage renal disease) on dialysis  - The current medical regimen is effective;  continue present plan and medications.    Essential hypertension  - The current medical regimen is effective;  continue present plan and medications.    Hyperparathyroidism, secondary renal  - stable observe

## 2017-03-31 ENCOUNTER — HOSPITAL ENCOUNTER (OUTPATIENT)
Dept: VASCULAR SURGERY | Facility: CLINIC | Age: 52
Discharge: HOME | End: 2017-03-31
Attending: STUDENT IN AN ORGANIZED HEALTH CARE EDUCATION/TRAINING PROGRAM
Payer: COMMERCIAL

## 2017-03-31 ENCOUNTER — OFFICE VISIT (OUTPATIENT)
Dept: VASCULAR SURGERY | Facility: CLINIC | Age: 52
End: 2017-03-31
Payer: COMMERCIAL

## 2017-03-31 ENCOUNTER — TELEPHONE (OUTPATIENT)
Dept: NEPHROLOGY | Facility: CLINIC | Age: 52
End: 2017-03-31

## 2017-03-31 VITALS
TEMPERATURE: 98 F | SYSTOLIC BLOOD PRESSURE: 169 MMHG | HEART RATE: 105 BPM | BODY MASS INDEX: 23.9 KG/M2 | WEIGHT: 140 LBS | DIASTOLIC BLOOD PRESSURE: 94 MMHG | HEIGHT: 64 IN

## 2017-03-31 DIAGNOSIS — T82.858D STENOSIS OF ARTERIOVENOUS DIALYSIS FISTULA, SUBSEQUENT ENCOUNTER: Primary | ICD-10-CM

## 2017-03-31 DIAGNOSIS — N18.6 ESRD (END STAGE RENAL DISEASE) ON DIALYSIS: Primary | ICD-10-CM

## 2017-03-31 DIAGNOSIS — Z99.2 ESRD (END STAGE RENAL DISEASE) ON DIALYSIS: Primary | ICD-10-CM

## 2017-03-31 DIAGNOSIS — T85.858A STENOSIS DUE TO ANY DEVICE, IMPLANT OR GRAFT: ICD-10-CM

## 2017-03-31 DIAGNOSIS — Z99.2 ESRD ON DIALYSIS: ICD-10-CM

## 2017-03-31 DIAGNOSIS — N18.6 ESRD ON DIALYSIS: ICD-10-CM

## 2017-03-31 PROCEDURE — 99999 PR PBB SHADOW E&M-EST. PATIENT-LVL III: CPT | Mod: PBBFAC,,, | Performed by: SURGERY

## 2017-03-31 PROCEDURE — 99024 POSTOP FOLLOW-UP VISIT: CPT | Mod: S$GLB,,, | Performed by: SURGERY

## 2017-03-31 PROCEDURE — 36589 REMOVAL TUNNELED CV CATH: CPT | Mod: S$GLB,,, | Performed by: SURGERY

## 2017-03-31 PROCEDURE — 93990 DOPPLER FLOW TESTING: CPT | Mod: S$GLB,,, | Performed by: SURGERY

## 2017-03-31 RX ORDER — NEBIVOLOL 5 MG/1
5 TABLET ORAL DAILY
Qty: 30 TABLET | Refills: 11 | Status: SHIPPED | OUTPATIENT
Start: 2017-03-31 | End: 2017-05-10 | Stop reason: ALTCHOICE

## 2017-03-31 RX ORDER — LOSARTAN POTASSIUM 100 MG/1
100 TABLET ORAL DAILY
Qty: 30 TABLET | Refills: 5 | Status: SHIPPED | OUTPATIENT
Start: 2017-03-31 | End: 2017-08-18 | Stop reason: SDUPTHER

## 2017-03-31 NOTE — PROGRESS NOTES
See my prior note; review of systems, family history and social history are   unchanged.    A 51-year-old female status post:  1.  Covered stent placement, left AV fistula outflow tract, 03/06/2017.  2.  Left upper arm 4 to 7 tapered AV graft placement, 01/01/2017.    She now returns.  Of note, at the time of surgery, she was found to have an   exceptionally thin and friable 4 mm brachial vein, which was used as the   outflow.  When she returned with a stenosis above this anastomosis, this was   treated empirically with a covered stent because of risk of rupture.    She now returns.    She says she is not having any trouble at all using the graft and is anxious to   have the PermCath removed.    PHYSICAL EXAMINATION:  VITAL SIGNS:  See nursing note.  EXTREMITIES:  Left arm shows a nice thrill with no pulsatility whatsoever in the   upper arm prosthetic graft.    IMAGING:  Duplex of the graft shows no stenosis whatsoever and a flow volume of   2.3 L compared to 1.5 before.  Venous anastomotic and outflow velocity 318   compared to 791 prior.    MEDICATIONS:  No anticoagulants or antiplatelet agents.    ASSESSMENT:  Successful percutaneous intervention of outflow stenosis.    PLAN:  1.  Discontinue PermCath today.  2.  Follow up in four months with a surveillance duplex, sooner if clinically   indicated.    PROCEDURE NOTE:  After sterile prep and drape and infiltration with 1%   lidocaine, the PermCath sheath was freed up using blunt and sharp dissection.    PermCath was removed in its entirety and pressure was used for hemostasis.    There were no complications.      BEAR  dd: 03/31/2017 08:38:29 (CDT)  td: 04/01/2017 07:48:22 (CDT)  Doc ID   #[id number]  Job ID #[id number]    CC:

## 2017-04-05 ENCOUNTER — HOSPITAL ENCOUNTER (OUTPATIENT)
Dept: RADIOLOGY | Facility: HOSPITAL | Age: 52
Discharge: HOME | End: 2017-04-05
Attending: NURSE PRACTITIONER
Payer: COMMERCIAL

## 2017-04-05 ENCOUNTER — HOSPITAL ENCOUNTER (OUTPATIENT)
Facility: HOSPITAL | Age: 52
Discharge: HOME | End: 2017-04-06
Attending: EMERGENCY MEDICINE | Admitting: INTERNAL MEDICINE
Payer: COMMERCIAL

## 2017-04-05 DIAGNOSIS — Z51.11 ENCOUNTER FOR CHEMOTHERAPY MANAGEMENT: ICD-10-CM

## 2017-04-05 DIAGNOSIS — I12.9 HYPERTENSIVE KIDNEY DISEASE WITH CHRONIC KIDNEY DISEASE, STAGE 1-4 OR UNSPECIFIED CHRONIC KIDNEY DISEASE: ICD-10-CM

## 2017-04-05 DIAGNOSIS — D63.1 ANEMIA IN ESRD (END-STAGE RENAL DISEASE): ICD-10-CM

## 2017-04-05 DIAGNOSIS — N18.6 ESRD ON HEMODIALYSIS: ICD-10-CM

## 2017-04-05 DIAGNOSIS — Z90.721 HISTORY OF RIGHT OOPHORECTOMY: ICD-10-CM

## 2017-04-05 DIAGNOSIS — T45.1X5A CHEMOTHERAPY INDUCED NAUSEA AND VOMITING: ICD-10-CM

## 2017-04-05 DIAGNOSIS — J90 PLEURAL EFFUSION, BILATERAL: Primary | ICD-10-CM

## 2017-04-05 DIAGNOSIS — Z99.2 ESRD ON HEMODIALYSIS: ICD-10-CM

## 2017-04-05 DIAGNOSIS — Z90.5 S/P NEPHRECTOMY: ICD-10-CM

## 2017-04-05 DIAGNOSIS — T45.1X5A CHEMOTHERAPY-INDUCED THROMBOCYTOPENIA: ICD-10-CM

## 2017-04-05 DIAGNOSIS — E04.1 THYROID NODULE: ICD-10-CM

## 2017-04-05 DIAGNOSIS — D69.59 CHEMOTHERAPY-INDUCED THROMBOCYTOPENIA: ICD-10-CM

## 2017-04-05 DIAGNOSIS — C64.2 METASTATIC RENAL CELL CARCINOMA, LEFT: ICD-10-CM

## 2017-04-05 DIAGNOSIS — J96.01 ACUTE HYPOXEMIC RESPIRATORY FAILURE: ICD-10-CM

## 2017-04-05 DIAGNOSIS — R09.02 HYPOXIA: ICD-10-CM

## 2017-04-05 DIAGNOSIS — C64.9 RENAL CELL CARCINOMA, UNSPECIFIED LATERALITY: ICD-10-CM

## 2017-04-05 DIAGNOSIS — C78.6 MALIGNANT NEOPLASM METASTATIC TO PERITONEUM: ICD-10-CM

## 2017-04-05 DIAGNOSIS — R11.2 CHEMOTHERAPY INDUCED NAUSEA AND VOMITING: ICD-10-CM

## 2017-04-05 DIAGNOSIS — N18.6 ANEMIA IN ESRD (END-STAGE RENAL DISEASE): ICD-10-CM

## 2017-04-05 LAB
ALBUMIN SERPL BCP-MCNC: 3.1 G/DL
ALP SERPL-CCNC: 53 U/L
ALT SERPL W/O P-5'-P-CCNC: 47 U/L
ANION GAP SERPL CALC-SCNC: 14 MMOL/L
APPEARANCE FLD: NORMAL
AST SERPL-CCNC: 39 U/L
BASOPHILS # BLD AUTO: 0.07 K/UL
BASOPHILS NFR BLD: 1.5 %
BILIRUB SERPL-MCNC: 0.8 MG/DL
BNP SERPL-MCNC: >4900 PG/ML
BODY FLD TYPE: NORMAL
BUN SERPL-MCNC: 37 MG/DL
CALCIUM SERPL-MCNC: 9.5 MG/DL
CHLORIDE SERPL-SCNC: 99 MMOL/L
CO2 SERPL-SCNC: 29 MMOL/L
COLOR FLD: YELLOW
CREAT SERPL-MCNC: 9.3 MG/DL
DIFFERENTIAL METHOD: ABNORMAL
EOSINOPHIL # BLD AUTO: 0.2 K/UL
EOSINOPHIL NFR BLD: 3.6 %
ERYTHROCYTE [DISTWIDTH] IN BLOOD BY AUTOMATED COUNT: 18.2 %
EST. GFR  (AFRICAN AMERICAN): 5 ML/MIN/1.73 M^2
EST. GFR  (NON AFRICAN AMERICAN): 4 ML/MIN/1.73 M^2
GLUCOSE SERPL-MCNC: 97 MG/DL
HCT VFR BLD AUTO: 41.9 %
HGB BLD-MCNC: 12.8 G/DL
INR PPP: 1.1
LYMPHOCYTES # BLD AUTO: 2.1 K/UL
LYMPHOCYTES NFR BLD: 43.1 %
LYMPHOCYTES NFR FLD MANUAL: 48 %
MCH RBC QN AUTO: 27.8 PG
MCHC RBC AUTO-ENTMCNC: 30.5 %
MCV RBC AUTO: 91 FL
MESOTHL CELL NFR FLD MANUAL: 2 %
MONOCYTES # BLD AUTO: 0.5 K/UL
MONOCYTES NFR BLD: 10.9 %
MONOS+MACROS NFR FLD MANUAL: 48 %
NEUTROPHILS # BLD AUTO: 2 K/UL
NEUTROPHILS NFR BLD: 40.9 %
NEUTROPHILS NFR FLD MANUAL: 2 %
PLATELET # BLD AUTO: 214 K/UL
PMV BLD AUTO: 11.2 FL
POTASSIUM SERPL-SCNC: 3.6 MMOL/L
PROT SERPL-MCNC: 6.6 G/DL
PROTHROMBIN TIME: 11.3 SEC
RBC # BLD AUTO: 4.61 M/UL
SODIUM SERPL-SCNC: 142 MMOL/L
TROPONIN I SERPL DL<=0.01 NG/ML-MCNC: 0.07 NG/ML
WBC # BLD AUTO: 4.76 K/UL
WBC # FLD: 169 /CU MM

## 2017-04-05 PROCEDURE — 93005 ELECTROCARDIOGRAM TRACING: CPT

## 2017-04-05 PROCEDURE — 99285 EMERGENCY DEPT VISIT HI MDM: CPT | Mod: NTX

## 2017-04-05 PROCEDURE — 83615 LACTATE (LD) (LDH) ENZYME: CPT | Mod: NTX

## 2017-04-05 PROCEDURE — 87205 SMEAR GRAM STAIN: CPT | Mod: NTX

## 2017-04-05 PROCEDURE — G0378 HOSPITAL OBSERVATION PER HR: HCPCS | Mod: NTX

## 2017-04-05 PROCEDURE — 80053 COMPREHEN METABOLIC PANEL: CPT | Mod: NTX

## 2017-04-05 PROCEDURE — 85610 PROTHROMBIN TIME: CPT | Mod: NTX

## 2017-04-05 PROCEDURE — 74181 MRI ABDOMEN W/O CONTRAST: CPT | Mod: TC

## 2017-04-05 PROCEDURE — 87070 CULTURE OTHR SPECIMN AEROBIC: CPT | Mod: NTX

## 2017-04-05 PROCEDURE — 99000 SPECIMEN HANDLING OFFICE-LAB: CPT | Mod: NTX

## 2017-04-05 PROCEDURE — 25000242 PHARM REV CODE 250 ALT 637 W/ HCPCS: Mod: NTX | Performed by: HOSPITALIST

## 2017-04-05 PROCEDURE — 71250 CT THORAX DX C-: CPT | Mod: TC

## 2017-04-05 PROCEDURE — 74181 MRI ABDOMEN W/O CONTRAST: CPT | Mod: 26,,, | Performed by: RADIOLOGY

## 2017-04-05 PROCEDURE — 85025 COMPLETE CBC W/AUTO DIFF WBC: CPT | Mod: NTX

## 2017-04-05 PROCEDURE — 93005 ELECTROCARDIOGRAM TRACING: CPT | Mod: NTX,59

## 2017-04-05 PROCEDURE — 82042 OTHER SOURCE ALBUMIN QUAN EA: CPT | Mod: NTX

## 2017-04-05 PROCEDURE — 83880 ASSAY OF NATRIURETIC PEPTIDE: CPT | Mod: NTX

## 2017-04-05 PROCEDURE — 82945 GLUCOSE OTHER FLUID: CPT | Mod: NTX

## 2017-04-05 PROCEDURE — 94640 AIRWAY INHALATION TREATMENT: CPT | Mod: NTX

## 2017-04-05 PROCEDURE — 84484 ASSAY OF TROPONIN QUANT: CPT | Mod: NTX

## 2017-04-05 PROCEDURE — 89051 BODY FLUID CELL COUNT: CPT | Mod: NTX

## 2017-04-05 PROCEDURE — 88108 CYTOPATH CONCENTRATE TECH: CPT | Mod: 26,NTX,, | Performed by: PATHOLOGY

## 2017-04-05 PROCEDURE — 88108 CYTOPATH CONCENTRATE TECH: CPT | Mod: NTX | Performed by: PATHOLOGY

## 2017-04-05 PROCEDURE — 84157 ASSAY OF PROTEIN OTHER: CPT | Mod: NTX

## 2017-04-05 PROCEDURE — 71250 CT THORAX DX C-: CPT | Mod: 26,,, | Performed by: RADIOLOGY

## 2017-04-05 RX ORDER — IPRATROPIUM BROMIDE AND ALBUTEROL SULFATE 2.5; .5 MG/3ML; MG/3ML
3 SOLUTION RESPIRATORY (INHALATION) EVERY 6 HOURS
Status: DISCONTINUED | OUTPATIENT
Start: 2017-04-06 | End: 2017-04-05

## 2017-04-05 RX ORDER — IPRATROPIUM BROMIDE AND ALBUTEROL SULFATE 2.5; .5 MG/3ML; MG/3ML
3 SOLUTION RESPIRATORY (INHALATION) EVERY 6 HOURS
Status: DISCONTINUED | OUTPATIENT
Start: 2017-04-05 | End: 2017-04-06 | Stop reason: HOSPADM

## 2017-04-05 RX ORDER — ACETAMINOPHEN 325 MG/1
650 TABLET ORAL EVERY 8 HOURS PRN
Status: DISCONTINUED | OUTPATIENT
Start: 2017-04-05 | End: 2017-04-06 | Stop reason: HOSPADM

## 2017-04-05 RX ORDER — NEBIVOLOL 5 MG/1
5 TABLET ORAL DAILY
Status: DISCONTINUED | OUTPATIENT
Start: 2017-04-05 | End: 2017-04-06 | Stop reason: HOSPADM

## 2017-04-05 RX ORDER — LOSARTAN POTASSIUM 25 MG/1
100 TABLET ORAL DAILY
Status: DISCONTINUED | OUTPATIENT
Start: 2017-04-05 | End: 2017-04-06 | Stop reason: HOSPADM

## 2017-04-05 RX ORDER — NIFEDIPINE 30 MG/1
90 TABLET, EXTENDED RELEASE ORAL DAILY
Status: DISCONTINUED | OUTPATIENT
Start: 2017-04-05 | End: 2017-04-06 | Stop reason: HOSPADM

## 2017-04-05 RX ORDER — FEBUXOSTAT 40 MG/1
40 TABLET, FILM COATED ORAL DAILY
Status: DISCONTINUED | OUTPATIENT
Start: 2017-04-05 | End: 2017-04-06 | Stop reason: HOSPADM

## 2017-04-05 RX ADMIN — IPRATROPIUM BROMIDE AND ALBUTEROL SULFATE 3 ML: .5; 3 SOLUTION RESPIRATORY (INHALATION) at 09:04

## 2017-04-05 NOTE — ED TRIAGE NOTES
Pt arrived to Ed due to SOB that started last week. Pt states history of Bronchitis, states use of ventolin inhaler, but states not working. Pt states being a dialysis pt with last dialysis yesterday. Pt noted to have cough, which is states to be at time productive with white sputum. Pt noted to be 90% on RA, placed on 2 liters o2 via NC

## 2017-04-05 NOTE — ED NOTES
Pt in room sitting up in bed with 2 liters o2 via NC in use side rails up x2, bed in lowest position. NAD noted will continue to monitor .

## 2017-04-05 NOTE — CONSULTS
Radiology Post-Procedure Note    Pre Op Diagnosis: Pleural effusion  Post Op Diagnosis: Same    Procedure: US guided right thoracentesis    Procedure performed by: Dr Cuong Espinoza    Written Informed Consent Obtained: Yes  Specimen Removed: YES 1.3L  Estimated Blood Loss: Minimal    Findings:   Successful US guided right thoracentesis    Patient tolerated procedure well.    Cuong Espinoza MD  Staff Radiologist  Department of Radiology

## 2017-04-05 NOTE — ED PROVIDER NOTES
Encounter Date: 4/5/2017    SCRIBE #1 NOTE: I, Salvatore Rudolph, am scribing for, and in the presence of, Leeroy Heath III, MD. Other sections scribed: HPI, ROS.       History     Chief Complaint   Patient presents with    Shortness of Breath     States she is having sob.  Says it has been going on for a monht.  Initially dx with bronchitis.      Review of patient's allergies indicates:   Allergen Reactions    Allopurinol      Other reaction(s): abnormal transaminases     HPI Comments: CC: Shortness of Breath  HPI: This 51 y.o. female with renal cell carcinoma s/p L nephrectomy, HTN, hyperparathyroidism, prediabetes, permacath in place presents to the ED c/o gradually worsening SOB and dry cough for the past few weeks. Pt states her SOB is worse when she attempts to lying down flat or exert herself. She states that her nephrologist Dr. Hart, recently increased the dose of her BP medication. She denies fever, chills, leg swelling, chest pain.      The history is provided by the patient.     Past Medical History:   Diagnosis Date    Anemia     Bronchitis 03/01/2017    Cancer 2016    kidney cancer    CMV (cytomegalovirus) antibody positive     Essential hypertension 9/23/2015    H/O herpes simplex type 2 infection     Herpes simplex type 1 antibody positive     History of kidney cancer     s/p left nephrectomy 1/2016    Hyperparathyroidism, unspecified     Hyperuricemia without signs of inflammatory arthritis and tophaceous disease     Kidney stones     LGSIL (low grade squamous intraepithelial dysplasia)     Prediabetes     Proteinuria     Renal disorder     Thyroid nodule     Urate nephropathy      Past Surgical History:   Procedure Laterality Date    BREAST SURGERY      LUMPECTOMY    COLONOSCOPY N/A 11/12/2015    NEPHRECTOMY-LAPAROSCOPIC Left 01/12/2016    PERITONEAL CATHETER INSERTION      Permacath insertion  01/12/2017    SALPINGOOPHORECTOMY Right 2016    KJB---DAVINCI    TONSILLECTOMY       TUBAL LIGATION       Family History   Problem Relation Age of Onset    Hypertension Mother     Diabetes Father     Kidney disease Father     Diabetes Maternal Grandfather     No Known Problems Sister     No Known Problems Brother     Heart disease Sister     No Known Problems Sister     No Known Problems Brother     Breast cancer Neg Hx     Colon cancer Neg Hx     Cancer Neg Hx     Stroke Neg Hx      Social History   Substance Use Topics    Smoking status: Never Smoker    Smokeless tobacco: Never Used    Alcohol use No      Comment: . 2 children. works at [employer].     Review of Systems   Constitutional: Negative for chills and fever.   HENT: Negative for rhinorrhea and sore throat.    Eyes: Negative for pain and visual disturbance.   Respiratory: Positive for cough and shortness of breath.    Cardiovascular: Negative for chest pain and leg swelling.   Gastrointestinal: Negative for abdominal pain, diarrhea, nausea and vomiting.   Genitourinary: Negative for difficulty urinating and dysuria.   Musculoskeletal: Negative for arthralgias and myalgias.   Skin: Negative for rash and wound.   Neurological: Negative for headaches.       Physical Exam   Initial Vitals   BP Pulse Resp Temp SpO2   04/05/17 0931 04/05/17 0931 04/05/17 0931 04/05/17 0931 04/05/17 0931   193/107 99 18 99 °F (37.2 °C) 88 %     Physical Exam    Nursing note and vitals reviewed.  Constitutional: She appears well-developed and well-nourished. She is not diaphoretic. No distress.   Sitting upright in bed with mild tachypnea and slightly increased work of breathing   HENT:   Head: Normocephalic and atraumatic.   Nose: Nose normal.   Mouth/Throat: Oropharynx is clear and moist. No oropharyngeal exudate.   Eyes: Conjunctivae and EOM are normal. Pupils are equal, round, and reactive to light. No scleral icterus.   Neck: Normal range of motion. Neck supple. No thyromegaly present. No tracheal deviation present.   Cardiovascular:  Normal rate, regular rhythm and normal heart sounds. Exam reveals no gallop and no friction rub.    No murmur heard.  Pulmonary/Chest: Breath sounds normal. No respiratory distress. She has no wheezes. She has no rhonchi. She has no rales.   Absent breath sounds at the right base, decreased breath sounds at the left base with rales   Abdominal: Soft. Bowel sounds are normal. She exhibits no distension and no mass. There is no tenderness. There is no rebound and no guarding.   Musculoskeletal: Normal range of motion. She exhibits no edema or tenderness.   Lymphadenopathy:     She has no cervical adenopathy.   Neurological: She is alert and oriented to person, place, and time. She has normal strength. No cranial nerve deficit or sensory deficit.   Skin: Skin is warm and dry. No rash noted. No erythema. No pallor.   Psychiatric: She has a normal mood and affect. Her behavior is normal. Thought content normal.         ED Course   Critical Care  Date/Time: 4/5/2017 7:28 PM  Performed by: DARRICK ASTUDILLO III  Authorized by: DARRICK ASTUDILLO III   Direct patient critical care time: 25 minutes  Additional history critical care time: 7 minutes  Ordering / reviewing critical care time: 8 minutes  Documentation critical care time: 9 minutes  Consulting other physicians critical care time: 10 minutes  Total critical care time (exclusive of procedural time) : 59 minutes  Critical care time was exclusive of teaching time and separately billable procedures and treating other patients.  Critical care was necessary to treat or prevent imminent or life-threatening deterioration of the following conditions: respiratory failure.        Labs Reviewed   CBC W/ AUTO DIFFERENTIAL   COMPREHENSIVE METABOLIC PANEL   TROPONIN I   PROTIME-INR   B-TYPE NATRIURETIC PEPTIDE             Medical Decision Making:   History:   Old Medical Records: I decided to obtain old medical records.  Old Records Summarized: records from clinic visits.       <>  Summary of Records: I reviewed the patient's CT from earlier today that was performed as an outpatient, and the patient has large right-sided pleural effusion, moderate left-sided pleural effusion.  Initial Assessment:   51-year-old female with a history of metastatic renal cell carcinoma and end-stage renal disease presents with progressive shortness of breath that rest, dyspnea on exertion, and orthopnea.  She states that she was unable to sleep last night and had to sit straight up in bed to try to sleep.  Exam reveals decreased breath sounds at the bases, no evidence of volume overload, no significant pallor, hypoxia on room air of 88% in triage, 90% currently.   Differential Diagnosis:   Volume overload, CHF, noncardiogenic pulmonary edema, pleural effusion, pneumothorax, anemia  Independently Interpreted Test(s):   I have ordered and independently interpreted X-rays - see summary below.       <> Summary of X-Ray Reading(s): Chest x-ray: Pulmonary vascular congestion with bilateral pleural effusions  I have ordered and independently interpreted EKG Reading(s) - see summary below       <> Summary of EKG Reading(s): Normal sinus rhythm at 87 bpm, normal axis, normal intervals, no definite acute ischemia  ED Management:  Large right-sided pleural effusion and moderate left-sided pleural effusion with no other abnormalities on workup.  Given the patient's pronounced respiratory difficulty, with inability to lie down or even sit in a reclined position, and also with hypoxia, will admit for therapeutic and diagnostic thoracentesis.    I have spoken with Dr. Cifuentes and also with interventional radiology and will place admission orders, with plan for thoracentesis today.    Have spoken with Dr. Espinoza after thoracentesis, and he reports possible very small pneumothorax on postprocedure x-ray.  He will order a second x-ray to be done in 2 hours.  I will place the patient on nonrebreather when she returns to the emergency  department.  I have informed Dr. Cifuentes.    Repeat x-ray was performed to early for unknown reason.  I have reordered patient's second x-ray.  She remains stable, with normal work of breathing.            Scribe Attestation:   Scribe #1: I performed the above scribed service and the documentation accurately describes the services I performed. I attest to the accuracy of the note.    Attending Attestation:           Physician Attestation for Scribe:  Physician Attestation Statement for Scribe #1: I, Armin Haines MD, reviewed documentation, as scribed by Salvatore Rudolph in my presence, and it is both accurate and complete.                 ED Course     Clinical Impression:   The primary encounter diagnosis was Pleural effusion, bilateral. Diagnoses of Hypoxia and Renal cell carcinoma, unspecified laterality were also pertinent to this visit.          Leeroy Heath III, MD  04/05/17 1929

## 2017-04-05 NOTE — ED NOTES
Spoke with Dr. Cifuentes states for pt to have renal diet, and if pt doesn't want to take PO medication, to give one time dose 10 mg of Hydralazine IV.

## 2017-04-05 NOTE — IP AVS SNAPSHOT
[place] [address] [place] [address]  Phone: [phone]           Patient Discharge Instructions   Our goal is to set you up for success. This packet includes information on your condition, medications, and your home care.  It will help you care for yourself to prevent having to return to the hospital.     Please ask your nurse if you have any questions.      There are many details to remember when preparing to leave the hospital. Here is what you will need to do:    1. Take your medicine. If you are prescribed medications, review your Medication List on the following pages. You may have new medications to  at the pharmacy and others that you'll need to stop taking. Review the instructions for how and when to take your medications. Talk with your doctor or nurses if you are unsure of what to do.     2. Go to your follow-up appointments. Specific follow-up information is listed in the following pages. Your may be contacted by a nurse or clinical provider about future appointments. Be sure we have all of the phone numbers to reach you. Please contact your provider's office if you are unable to make an appointment.     3. Watch for warning signs. Your doctor or nurse will give you detailed warning signs to watch for and when to call for assistance. These instructions may also include educational information about your condition. If you experience any of warning signs to your health, call your doctor.           [facility]  Unless otherwise directed by your provider, please   contact [facility], our nurse care line   that is available for 24/7 assistance.     [phone] (toll-free)     Registered nurses in the [facility]   provide: appointment scheduling, clinical advisement, health education, and other advisory services.                  ** Verify the list of medication(s) below is accurate and up to date. Carry this with you in case of emergency.  If your medications have changed, please notify your healthcare provider.             Medication List      START taking these medications        Additional Info                      promethazine-codeine 6.25-10 mg/5 ml 6.25-10 mg/5 mL syrup   Commonly known as:  PHENERGAN with CODEINE   Quantity:  118 mL   Refills:  0   Dose:  2.5 mL    Instructions:  Take 2.5 mLs by mouth every 4 (four) hours as needed for Cough.     Begin Date    AM    Noon    PM    Bedtime       sevelamer HCl 800 MG Tab   Commonly known as:  RENAGEL   Quantity:  180 tablet   Refills:  11   Dose:  1600 mg    Instructions:  Take 2 tablets (1,600 mg total) by mouth 3 (three) times daily with meals.     Begin Date    AM    Noon    PM    Bedtime         CONTINUE taking these medications        Additional Info                      acetaminophen 500 MG tablet   Commonly known as:  TYLENOL   Refills:  0   Dose:  500 mg    Instructions:  Take 500 mg by mouth every 6 (six) hours as needed for Pain.     Begin Date    AM    Noon    PM    Bedtime       albuterol 90 mcg/actuation inhaler   Quantity:  1 Inhaler   Refills:  0   Dose:  2 puff    Instructions:  Inhale 2 puffs into the lungs every 6 (six) hours as needed.     Begin Date    AM    Noon    PM    Bedtime       aluminum-magnesium hydroxide-simethicone 200-200-20 mg/5 mL Susp   Commonly known as:  MAALOX   Refills:  0   Dose:  30 mL   Indications:  prn    Instructions:  Take 30 mLs by mouth every 6 (six) hours as needed.     Begin Date    AM    Noon    PM    Bedtime       cabozantinib 60 mg Tab   Quantity:  30 tablet   Refills:  5   Dose:  60 mg    Instructions:  Take 60 mg by mouth once daily.     Begin Date    AM    Noon    PM    Bedtime       febuxostat 40 mg Tab   Commonly known as:  ULORIC   Quantity:  30 tablet   Refills:  4   Dose:  40 mg    Last time this was given:  40 mg on 4/6/2017  9:53 AM   Instructions:  Take 1 tablet (40 mg total) by mouth once daily.     Begin Date    AM    Noon    PM     Bedtime       fluticasone-salmeterol 100-50 mcg/dose 100-50 mcg/dose diskus inhaler   Commonly known as:  ADVAIR   Quantity:  1 each   Refills:  0   Dose:  1 puff    Instructions:  Inhale 1 puff into the lungs 2 (two) times daily. Controller     Begin Date    AM    Noon    PM    Bedtime       losartan 100 MG tablet   Commonly known as:  COZAAR   Quantity:  30 tablet   Refills:  5   Dose:  100 mg    Last time this was given:  100 mg on 4/6/2017  9:54 AM   Instructions:  Take 1 tablet (100 mg total) by mouth once daily.     Begin Date    AM    Noon    PM    Bedtime       METAMUCIL PLUS CALCIUM 1-60 gram-mg Cap   Refills:  0   Generic drug:  psyllium husk-calcium    Instructions:  Take by mouth. 2 Capsule Oral Every morning     Begin Date    AM    Noon    PM    Bedtime       nebivolol 5 MG Tab   Commonly known as:  BYSTOLIC   Quantity:  30 tablet   Refills:  11   Dose:  5 mg    Last time this was given:  5 mg on 4/6/2017  9:53 AM   Instructions:  Take 1 tablet (5 mg total) by mouth once daily.     Begin Date    AM    Noon    PM    Bedtime       nifedipine 90 MG (OSM) Tr24   Commonly known as:  PROCARDIA-XL   Quantity:  30 tablet   Refills:  5   Dose:  90 mg    Last time this was given:  90 mg on 4/6/2017  9:52 AM   Instructions:  Take 1 tablet (90 mg total) by mouth once daily.     Begin Date    AM    Noon    PM    Bedtime       ondansetron 8 MG Tbdl   Commonly known as:  ZOFRAN-ODT   Quantity:  30 tablet   Refills:  1   Dose:  8 mg    Instructions:  Take 1 tablet (8 mg total) by mouth every 12 (twelve) hours as needed.     Begin Date    AM    Noon    PM    Bedtime       ONE-A-DAY WOMENS FORMULA 27-0.4 mg Tab   Refills:  0   Generic drug:  multivitamin-Ca-iron-minerals    Instructions:  Take by mouth. 1 Tablet Oral Every day     Begin Date    AM    Noon    PM    Bedtime            Where to Get Your Medications      These medications were sent to [facility] [address] [place] [address] ([place], [place] - [address] [address]  [facility] ([facility] [place] [address]     Phone:  [phone]     sevelamer HCl 800 MG Tab         You can get these medications from any pharmacy     Bring a paper prescription for each of these medications     promethazine-codeine 6.25-10 mg/5 ml 6.25-10 mg/5 mL syrup                  Please bring to all follow up appointments:    1. A copy of your discharge instructions.  2. All medicines you are currently taking in their original bottles.  3. Identification and insurance card.    Please arrive 15 minutes ahead of scheduled appointment time.    Please call 24 hours in advance if you must reschedule your appointment and/or time.        Your Scheduled Appointments     Apr 07, 2017  9:00 AM CDT   Non-Fasting Lab with LAB, HEMONC CANCER BLDG [facility] ([facility])    [address]  [place] [address]   [phone]            Apr 07, 2017 10:00 AM CDT   Established Patient Visit with Ben Rivera MD   [place] - Hematology Oncology ([facility])    [facility]  [place] [address]   [phone]            Apr 07, 2017  1:00 PM CDT   Hospital Follow Up with Priyank Robledo NP   [facility] ([facility])    [address] [facility] [address]   [phone]            May 17, 2017  8:00 AM CDT   Established Patient Visit with Sowmya Mccain MD   [facility] ([facility])    [address] [facility] [address] [phone]            Jeff 15, 2017  9:40 AM CDT   Established Patient Visit with Alison Rivera MD   [facility] - Urology ([facility])    [address] [facility] [address]  [place] [id number] [phone]              Follow-up Information     Follow up with Sowmya Mccain MD On 4/7/2017.    Specialties:  Internal Medicine, Pediatrics    Why:  @1:00pm for hospital follow up with Geovanny Robledo NP, Outpatient services    Contact information:    [address] [facility]  "[place] [address]  [phone]            Primary Diagnosis     Your primary diagnosis was:  Acute Hypoxemic Respiratory Failure      Admission Information     Date & Time Provider Department CSN    4/5/2017  9:49 AM Martha Romero MD [facility] [id number]      Care Providers     Provider Role Specialty Primary office phone    Martha Romero MD Attending Provider Hospitalist [phone]    Williams Hart MD Consulting Physician  Nephrology [phone]      Your Vitals Were     BP Pulse Temp Resp Height Weight    126/79 (BP Location: Right arm, Patient Position: Lying) 76 97.6 °F (36.4 °C) 18 5' 4" (1.626 m) 64 kg (141 lb)    SpO2 BMI             96% 24.2 kg/m2         Recent Lab Values        10/9/2015 9/8/2016                       11:25 AM  8:10 AM          A1C 6.0 5.1          Comment for A1C at  8:10 AM on 9/8/2016:  According to ADA guidelines, hemoglobin A1C <7.0% represents  optimal control in non-pregnant diabetic patients.  Different  metrics may apply to specific populations.   Standards of Medical Care in Diabetes - 2016.  For the purpose of screening for the presence of diabetes:  <5.7%     Consistent with the absence of diabetes  5.7-6.4%  Consistent with increasing risk for diabetes   (prediabetes)  >or=6.5%  Consistent with diabetes  Currently no consensus exists for use of hemoglobin A1C  for diagnosis of diabetes for children.        Pending Labs     Order Current Status    Cytology Specimen-Medical Cytology (Fluid/Wash/Brush) Collected (04/05/17 0500)    Freeze and Hold,  Collected (04/05/17 0500)    Culture, Fluid with Gram Preliminary result      Allergies as of 4/6/2017        Reactions    Allopurinol     Other reaction(s): abnormal transaminases      Advance Directives     An advance directive is a document which, in the event you are no longer able to make decisions for yourself, tells your healthcare team what kind of treatment you do or do not want to " receive, or who you would like to make those decisions for you.  If you do not currently have an advance directive, [facility] encourages you to create one.  For more information call:  [phone]), [phone] ([phone]),  or log on to [email or web address].        Language Assistance Services     ATTENTION: Language assistance services are available, free of charge. Please call [phone].      ATENCIÓN: Si habla español, tiene a bonilla disposición servicios gratuitos de asistencia lingüística. Llame al [phone].     CHÚ Ý: N?u b?n nói Ti?ng Vi?t, có các d?ch v? h? tr? ngôn ng? mi?n phí dành cho b?n. G?i s? [phone].        Chronic Kindey Disease Education              [facility] complies with applicable Federal civil rights laws and does not discriminate on the basis of race, color, national origin, age, disability, or sex.

## 2017-04-05 NOTE — PROGRESS NOTES
Report given to FELIPE Angulo.  Patient had a right thoracentesis.  1350mL light yellow fluid was removed.  Fluid was sent to the lab.  Vaseline gauze and tegaderm applied to site. Chest Xray performed and another to be performed at 1915.  There is a small pneumothorax in which Dr. Espinoza notified Dr Heath.

## 2017-04-05 NOTE — ED NOTES
Pt in room side rails up x2, bed in ,lowest position, pt has 2 liters o2 via NC in place.  at bedside with pt. Pt is AAOx3 no complaints at this time. VS stable will continue to monitor

## 2017-04-06 VITALS
TEMPERATURE: 98 F | RESPIRATION RATE: 18 BRPM | SYSTOLIC BLOOD PRESSURE: 126 MMHG | OXYGEN SATURATION: 96 % | BODY MASS INDEX: 24.07 KG/M2 | DIASTOLIC BLOOD PRESSURE: 79 MMHG | WEIGHT: 141 LBS | HEIGHT: 64 IN | HEART RATE: 76 BPM

## 2017-04-06 PROBLEM — C64.9 METASTATIC RENAL CELL CARCINOMA TO INTRA-ABDOMINAL SITE: Status: ACTIVE | Noted: 2017-04-06

## 2017-04-06 PROBLEM — C79.89 METASTATIC RENAL CELL CARCINOMA TO INTRA-ABDOMINAL SITE: Status: ACTIVE | Noted: 2017-04-06

## 2017-04-06 PROBLEM — J96.01 ACUTE HYPOXEMIC RESPIRATORY FAILURE: Status: ACTIVE | Noted: 2017-04-06

## 2017-04-06 LAB
ALBUMIN FLD-MCNC: 0.7 G/DL
ANION GAP SERPL CALC-SCNC: 16 MMOL/L
BASOPHILS # BLD AUTO: 0.07 K/UL
BASOPHILS NFR BLD: 1.1 %
BODY FLUID SOURCE, LDH: NORMAL
BUN SERPL-MCNC: 47 MG/DL
CALCIUM SERPL-MCNC: 9.6 MG/DL
CHLORIDE SERPL-SCNC: 97 MMOL/L
CO2 SERPL-SCNC: 29 MMOL/L
CREAT SERPL-MCNC: 11 MG/DL
DIFFERENTIAL METHOD: ABNORMAL
EOSINOPHIL # BLD AUTO: 0.2 K/UL
EOSINOPHIL NFR BLD: 3.5 %
ERYTHROCYTE [DISTWIDTH] IN BLOOD BY AUTOMATED COUNT: 18.3 %
EST. GFR  (AFRICAN AMERICAN): 4 ML/MIN/1.73 M^2
EST. GFR  (NON AFRICAN AMERICAN): 4 ML/MIN/1.73 M^2
GLUCOSE FLD-MCNC: 102 MG/DL
GLUCOSE SERPL-MCNC: 85 MG/DL
HCT VFR BLD AUTO: 41.2 %
HGB BLD-MCNC: 12.4 G/DL
LDH FLD L TO P-CCNC: 72 U/L
LYMPHOCYTES # BLD AUTO: 1.7 K/UL
LYMPHOCYTES NFR BLD: 26 %
MAGNESIUM SERPL-MCNC: 2.3 MG/DL
MCH RBC QN AUTO: 27.1 PG
MCHC RBC AUTO-ENTMCNC: 30.1 %
MCV RBC AUTO: 90 FL
MONOCYTES # BLD AUTO: 0.5 K/UL
MONOCYTES NFR BLD: 7.7 %
NEUTROPHILS # BLD AUTO: 4 K/UL
NEUTROPHILS NFR BLD: 61.5 %
PHOSPHATE SERPL-MCNC: 7.8 MG/DL
PLATELET # BLD AUTO: 224 K/UL
PMV BLD AUTO: 10.9 FL
POTASSIUM SERPL-SCNC: 3.6 MMOL/L
PROT FLD-MCNC: 1 G/DL
RBC # BLD AUTO: 4.58 M/UL
SODIUM SERPL-SCNC: 142 MMOL/L
SPECIMEN SOURCE: NORMAL
TSH SERPL DL<=0.005 MIU/L-ACNC: 1.4 UIU/ML
WBC # BLD AUTO: 6.53 K/UL

## 2017-04-06 PROCEDURE — 94640 AIRWAY INHALATION TREATMENT: CPT | Mod: NTX

## 2017-04-06 PROCEDURE — 36415 COLL VENOUS BLD VENIPUNCTURE: CPT | Mod: NTX

## 2017-04-06 PROCEDURE — 84443 ASSAY THYROID STIM HORMONE: CPT | Mod: NTX

## 2017-04-06 PROCEDURE — 80100016 HC MAINTENANCE HEMODIALYSIS: Mod: NTX

## 2017-04-06 PROCEDURE — 84100 ASSAY OF PHOSPHORUS: CPT | Mod: NTX

## 2017-04-06 PROCEDURE — 80048 BASIC METABOLIC PNL TOTAL CA: CPT | Mod: NTX

## 2017-04-06 PROCEDURE — G0257 UNSCHED DIALYSIS ESRD PT HOS: HCPCS

## 2017-04-06 PROCEDURE — 63600175 PHARM REV CODE 636 W HCPCS: Mod: NTX | Performed by: INTERNAL MEDICINE

## 2017-04-06 PROCEDURE — 85025 COMPLETE CBC W/AUTO DIFF WBC: CPT | Mod: NTX

## 2017-04-06 PROCEDURE — 83735 ASSAY OF MAGNESIUM: CPT | Mod: NTX

## 2017-04-06 PROCEDURE — 27000221 HC OXYGEN, UP TO 24 HOURS: Mod: NTX

## 2017-04-06 PROCEDURE — 25000003 PHARM REV CODE 250: Mod: NTX | Performed by: EMERGENCY MEDICINE

## 2017-04-06 PROCEDURE — G0378 HOSPITAL OBSERVATION PER HR: HCPCS | Mod: NTX

## 2017-04-06 PROCEDURE — 25000242 PHARM REV CODE 250 ALT 637 W/ HCPCS: Mod: NTX | Performed by: HOSPITALIST

## 2017-04-06 RX ORDER — PROMETHAZINE HYDROCHLORIDE AND CODEINE PHOSPHATE 6.25; 1 MG/5ML; MG/5ML
2.5 SOLUTION ORAL EVERY 4 HOURS PRN
Qty: 118 ML | Refills: 0 | Status: SHIPPED | OUTPATIENT
Start: 2017-04-06 | End: 2017-05-17

## 2017-04-06 RX ORDER — SEVELAMER HYDROCHLORIDE 800 MG/1
1600 TABLET, FILM COATED ORAL
Qty: 180 TABLET | Refills: 11 | Status: SHIPPED | OUTPATIENT
Start: 2017-04-06 | End: 2017-04-07 | Stop reason: SDUPTHER

## 2017-04-06 RX ORDER — ALBUTEROL SULFATE 90 UG/1
2 AEROSOL, METERED RESPIRATORY (INHALATION) EVERY 6 HOURS PRN
Status: DISCONTINUED | OUTPATIENT
Start: 2017-04-06 | End: 2017-04-06

## 2017-04-06 RX ORDER — FLUTICASONE FUROATE AND VILANTEROL 100; 25 UG/1; UG/1
1 POWDER RESPIRATORY (INHALATION) DAILY
Status: DISCONTINUED | OUTPATIENT
Start: 2017-04-06 | End: 2017-04-06 | Stop reason: HOSPADM

## 2017-04-06 RX ADMIN — IPRATROPIUM BROMIDE AND ALBUTEROL SULFATE 3 ML: .5; 3 SOLUTION RESPIRATORY (INHALATION) at 01:04

## 2017-04-06 RX ADMIN — NIFEDIPINE 90 MG: 30 TABLET, FILM COATED, EXTENDED RELEASE ORAL at 09:04

## 2017-04-06 RX ADMIN — IPRATROPIUM BROMIDE AND ALBUTEROL SULFATE 3 ML: .5; 3 SOLUTION RESPIRATORY (INHALATION) at 08:04

## 2017-04-06 RX ADMIN — FEBUXOSTAT 40 MG: 40 TABLET ORAL at 09:04

## 2017-04-06 RX ADMIN — NEBIVOLOL HYDROCHLORIDE 5 MG: 5 TABLET ORAL at 09:04

## 2017-04-06 RX ADMIN — LOSARTAN POTASSIUM 100 MG: 25 TABLET, FILM COATED ORAL at 09:04

## 2017-04-06 RX ADMIN — FLUTICASONE FUROATE AND VILANTEROL TRIFENATATE 1 PUFF: 100; 25 POWDER RESPIRATORY (INHALATION) at 01:04

## 2017-04-06 NOTE — ASSESSMENT & PLAN NOTE
Progressive in nature, most likely due to developing bilateral pleural effusions (R>>L) despite routine dialysis. Is s/p diagnostic and therapeutic thoracentesis. Fluid was light-yellow in color. Labwork does not suggest acute infection and not quite exudative in nature. Will re-assess breathing and supp O2 requirements status post HD today. Consider repeat 2D Echo as outpatient (10/2016 LVEF 55%, otherwise normal Echo). EKG does meet LVH criteria.

## 2017-04-06 NOTE — PROGRESS NOTES
"Hospital Medicine  Cross-Cover Note        Diagnosis leading to hospitalization: Pleural effusion, bilateral    Length of Stay since admission: 0     HPI - Interval History:       Case discussed with Dr. Cifuentes at shift change.  Recommended follow-up on chest x-ray for report of pneumothorax status post thoracentesis for pleural effusion.    Vitals:  Vitals:    04/05/17 1817 04/05/17 1832 04/05/17 1902 04/05/17 2017   BP:  (!) 182/85 (!) 166/87 (!) 166/89   BP Location:       BP Method:       Pulse:  88 90 98   Resp:       Temp: 99.5 °F (37.5 °C)   98.1 °F (36.7 °C)   TempSrc: Oral      SpO2:  100% 98% 97%   Weight:    64 kg (141 lb)   Height:    5' 4" (1.626 m)       Current Medications:  Scheduled Meds:   albuterol-ipratropium 2.5mg-0.5mg/3mL  3 mL Nebulization Q6H    febuxostat  40 mg Oral Daily    losartan  100 mg Oral Daily    nebivolol  5 mg Oral Daily    nifedipine  90 mg Oral Daily     Continuous Infusions:   PRN Meds:.acetaminophen    Lab Results:     CBC:    Recent Labs  Lab 04/05/17  1020   WBC 4.76   HGB 12.8      MCV 91   MCH 27.8   MCHC 30.5*   RBC 4.61       CMP:    Recent Labs  Lab 04/05/17  1020      K 3.6   CL 99   CO2 29   BUN 37*   CREATININE 9.3*   CALCIUM 9.5   PROT 6.6   BILITOT 0.8   ALKPHOS 53*   ALT 47*   AST 39           Recent Labs  Lab 04/05/17  1020      K 3.6   CL 99   CO2 29   BUN 37*   CREATININE 9.3*   CALCIUM 9.5   PROT 6.6   BILITOT 0.8   ALKPHOS 53*   ALT 47*   AST 39     Coagulantion studies    Recent Labs  Lab 04/05/17  1020   INR 1.1       Imaging Results         X-Ray Chest 1 View (Final result) Result time:  04/05/17 19:32:59    Final result by Javid Sapp MD (04/05/17 19:32:59)    Impression:     As above.      Electronically signed by: JAVID SAPP MD  Date:     04/05/17  Time:    19:32     Narrative:    Chest 1 view.    Findings: There is no pneumothorax.  There has been no significant change compared to previous performed approximately 2 hours " earlier.            X-Ray Chest 1 View (Final result) Result time:  04/05/17 17:53:38    Final result by Javid Sapp MD (04/05/17 17:53:38)    Impression:     As above.      Electronically signed by: JAVID SAPP MD  Date:     04/05/17  Time:    17:53     Narrative:    Chest 1 view.    Findings: There are bilateral perihilar and basilar opacities left greater than right with interval increased aeration of the left lung compared to previous performed on same date.  There is no pneumothorax.  The remainder of the examination is unchanged.            X-Ray Chest 1 View (Final result) Result time:  04/05/17 17:38:10    Final result by To Paul MD (04/05/17 17:38:10)    Impression:        No evidence of complication status post right-sided thoracentesis. Unchanged appearance of the left lung.      Electronically signed by: TO PAUL MD  Date:     04/05/17  Time:    17:38     Narrative:    PORTABLE AP CHEST:      Comparison: None.    Findings:      No evidence of complication status post right-sided thoracentesis. Unchanged appearance of the left lung. The cardiac silhouette isunchanged in size.  There are no acute bony abnormalities.            IR Thoracentesis with Catheter (In process)         X-Ray Chest 1 View (Final result) Result time:  04/05/17 10:58:46    Final result by Javid Panchal MD (04/05/17 10:58:46)    Impression:      Development of pulmonary vascular congestion with bilateral parahilar and basilar consolidation in a pattern most suggestive of moderate pulmonary edema.  Moderate layering bilateral pleural effusions are suspected.  Vascular stent projecting over the left axilla.      Electronically signed by: JAVID PANCHAL MD  Date:     04/05/17  Time:    10:58     Narrative:    Comparison is made to prior examination dated 10/7/16.    A single AP radiograph of the chest was obtained.  The cardiac silhouette is prominent in size although this is partially related to magnification on  this AP projection.  Superior mediastinal structures are unremarkable.  There is pulmonary vascular congestion present with bilateral parahilar and basilar opacities suggestive of moderate pulmonary edema.  The costophrenic angles are poorly delineated and layering bilateral pleural effusions are suspected.  There is no evidence for pneumothorax.  There is a vascular stent projecting over the left axilla.  Bony structures are grossly intact.                Consults:  IP CONSULT TO NEPHROLOGY  IP CONSULT TO INTERVENTIONAL RADIOLOGY     Assessment and Plan:    Active Hospital Problems    Diagnosis  POA    *Pleural effusion, bilateral [J90]  Yes      Resolved Hospital Problems    Diagnosis Date Resolved POA   No resolved problems to display.       Labs were reviewed.  Imaging was reviewed.  Problem listed reviewed and updated where needed.      The 3 post procedure chest x-rays (17:19, 17:42, 19:27) do not demonstrate evidence of pneumothorax.        Additional orders placed:    1. Discontinue chest x-ray scheduled for 20:13   2. We will obtain STAT chest x-ray if her her clinical course were to worsen overnight     Melchor Bernard MD, MPH  Hospital Medicine   Pager: [phone]

## 2017-04-06 NOTE — PROGRESS NOTES
Patient is complaining of cough. MD updated. New orders received. Will reassess patient after she receives her breathing treatment.

## 2017-04-06 NOTE — SUBJECTIVE & OBJECTIVE
Past Medical History:   Diagnosis Date    Anemia     Bronchitis 03/01/2017    Cancer 2016    kidney cancer    CMV (cytomegalovirus) antibody positive     Essential hypertension 9/23/2015    H/O herpes simplex type 2 infection     Herpes simplex type 1 antibody positive     History of kidney cancer     s/p left nephrectomy 1/2016    Hyperparathyroidism, unspecified     Hyperuricemia without signs of inflammatory arthritis and tophaceous disease     Kidney stones     LGSIL (low grade squamous intraepithelial dysplasia)     Prediabetes     Proteinuria     Renal disorder     Thyroid nodule     Urate nephropathy        Past Surgical History:   Procedure Laterality Date    BREAST SURGERY      LUMPECTOMY    COLONOSCOPY N/A 11/12/2015    NEPHRECTOMY-LAPAROSCOPIC Left 01/12/2016    PERITONEAL CATHETER INSERTION      Permacath insertion  01/12/2017    SALPINGOOPHORECTOMY Right 2016    KJB---DAVINCI    TONSILLECTOMY      TUBAL LIGATION         Review of patient's allergies indicates:   Allergen Reactions    Allopurinol      Other reaction(s): abnormal transaminases       No current facility-administered medications on file prior to encounter.      Current Outpatient Prescriptions on File Prior to Encounter   Medication Sig    albuterol 90 mcg/actuation inhaler Inhale 2 puffs into the lungs every 6 (six) hours as needed.    cabozantinib 60 mg Tab Take 60 mg by mouth once daily.    febuxostat (ULORIC) 40 mg Tab Take 1 tablet (40 mg total) by mouth once daily.    losartan (COZAAR) 100 MG tablet Take 1 tablet (100 mg total) by mouth once daily.    multivitamin-Ca-iron-minerals (ONE-A-DAY WOMENS FORMULA) 27-0.4 mg Tab Take by mouth. 1 Tablet Oral Every day    nebivolol (BYSTOLIC) 5 MG Tab Take 1 tablet (5 mg total) by mouth once daily.    nifedipine (PROCARDIA-XL) 90 MG (OSM) TR24 Take 1 tablet (90 mg total) by mouth once daily.    psyllium husk-calcium (METAMUCIL PLUS CALCIUM) 1-60 gram-mg Cap  Take by mouth. 2 Capsule Oral Every morning    acetaminophen (TYLENOL) 500 MG tablet Take 500 mg by mouth every 6 (six) hours as needed for Pain.    aluminum-magnesium hydroxide-simethicone (MAALOX) 200-200-20 mg/5 mL Susp Take 30 mLs by mouth every 6 (six) hours as needed.     fluticasone-salmeterol 100-50 mcg/dose (ADVAIR) 100-50 mcg/dose diskus inhaler Inhale 1 puff into the lungs 2 (two) times daily. Controller    ondansetron (ZOFRAN-ODT) 8 MG TbDL Take 1 tablet (8 mg total) by mouth every 12 (twelve) hours as needed.     Family History     Problem Relation (Age of Onset)    Diabetes Father, Maternal Grandfather    Heart disease Sister    Hypertension Mother    Kidney disease Father    No Known Problems Sister, Brother, Sister, Brother        Social History Main Topics    Smoking status: Never Smoker    Smokeless tobacco: Never Used    Alcohol use No      Comment: . 2 children. works at [employer].    Drug use: No    Sexual activity: Yes     Partners: Male     Review of Systems   Constitutional: Positive for fatigue.   HENT: Negative.    Eyes: Negative.    Respiratory: Positive for cough, shortness of breath and wheezing.    Cardiovascular: Negative.    Gastrointestinal: Negative.    Endocrine: Negative.    Genitourinary: Negative.    Musculoskeletal: Negative.    Skin: Negative.    Allergic/Immunologic: Positive for immunocompromised state.   Neurological: Negative.    Hematological: Negative.    Psychiatric/Behavioral: Negative.      Objective:     Vital Signs (Most Recent):  Temp: 98 °F (36.7 °C) (04/06/17 0400)  Pulse: 86 (04/06/17 0812)  Resp: 18 (04/06/17 0812)  BP: (!) 164/94 (04/06/17 0400)  SpO2: 97 % (04/06/17 0812) Vital Signs (24h Range):  Temp:  [98 °F (36.7 °C)-99.5 °F (37.5 °C)] 98 °F (36.7 °C)  Pulse:  [82-99] 86  Resp:  [18-22] 18  SpO2:  [88 %-100 %] 97 %  BP: (148-193)/() 164/94     Weight: 64 kg (141 lb)  Body mass index is 24.2 kg/(m^2).    Physical Exam   Constitutional:  She is oriented to person, place, and time. She appears well-developed. No distress.   HENT:   Head: Normocephalic and atraumatic.   Mouth/Throat: Oropharynx is clear and moist.   Eyes: Conjunctivae and EOM are normal. No scleral icterus.   Neck: Normal range of motion.   Cardiovascular: Normal rate and regular rhythm.    Pulmonary/Chest: Effort normal. No respiratory distress. She has no wheezes. She has no rales. She exhibits no tenderness.   On NC  Absent breath sound on left lower lobe  Dry crackles on right lower lobe   Abdominal: Soft. Bowel sounds are normal.   Musculoskeletal: Normal range of motion. She exhibits no edema.   Neurological: She is alert and oriented to person, place, and time.   Skin: Skin is warm and dry. She is not diaphoretic.   Psychiatric: She has a normal mood and affect. Her behavior is normal. Judgment and thought content normal.   Vitals reviewed.       Significant Labs: All pertinent labs within the past 24 hours have been reviewed.    Significant Imaging: I have reviewed all pertinent imaging results/findings within the past 24 hours.  I have reviewed and interpreted all pertinent imaging results/findings within the past 24 hours.

## 2017-04-06 NOTE — DISCHARGE SUMMARY
[facility] Medicine  Discharge Summary      Patient Name: Eva Bloom  MRN: [id number]  Admission Date: 4/5/2017  Hospital Length of Stay: 0 days  Discharge Date and Time:  04/06/2017 6:48 PM  Attending Physician: Martha Romero MD   Discharging Provider: Martha Cifuentes MD  Primary Care Provider: Sowmya Mccain MD      HPI:   51 year old female with metastatic renal cell carcinoma (s/p left nephrectomy), ESRD on HD every TTS and malignant hypertension who presented with worsening of shortness of breath of > 2 weeks in evolution. Noticed that on 3/28 she was seen by her PCP for shortness of breath, wheezing and cough. Prescribed albuterol PRN and ADVAIR. Not on home supp O2. Shortness of breath progressed despite this treatment.  Is worse when she attempts to lying down flat or exert herself. She states that her nephrologist Dr. Hart, recently increased the dose of her BP medication. She denies fever, chills, leg swelling, chest pain. Had completed full session of dialysis day prior.     In the ED, a large right sided pleural effusion and moderate left sided pleural effusion noted via CT, which was actually done in the ambulatory setting prior to arrival to the ED. Was in distress at the time of evaluation. SaO2 at RA 88%, responding well to low flow supp oxygen. Hypertensive, slightly tachycardic and with a temp of 99F. No leukocytosis nor neutrophilia. An US-guided thoracentesis was done by IR, where 1350mL light yellow fluid were removed. Developed a small pneumothorax afterwards, that resolved (per serial XRays done throughout that night). Patient's shortness of breath much improved, but remained on low flow supp O2.         * No surgery found *      Indwelling Lines/Drains at time of discharge:   Lines/Drains/Airways     Central Venous Catheter Line                 Hemodialysis Catheter 01/12/17 0627 84 days          Drain                 Hemodialysis AV Fistula 01/12/17  0629 Left upper arm 84 days              Hospital Course:   Mr Bloom presented with worsening hypoxemic respiratory failure secondary to gradual development of bilateral pleural effusions (R>>L) despite routine UF via HD every TTS. Was admitted to Obs.Is s/p diagnostic and therapeutic thoracentesis (4/6/2017). Fluid was light-yellow in color. Labwork does not suggest acute infection and not quite exudative in nature. Hypoxemia and respiratory status much improved after thoracentesis and ~2.5L removed via routine HD. Able to maintain adequate SaO2 at room air. Recommend repeat 2D Echo as outpatient (10/2016 LVEF 55%, otherwise normal Echo) as this may be cardiogenic in nature. EKG does meet LVH criteria. A PE less likely at this time as she quickly improved with removal of fluid. Recommended continuing current bronchodilators and added low dose prometh-codeine for persistent/hacking cough. Also added sevelamer 1600 mg with meals for hyperphosphatemia. Nephrology on board for co-management. Is to follow up with Dr Rivera (oncologist) and PCP on 4/7, which is tomorrow. Activity as tolerated. Renal diet.      Consults:   Consults         Status Ordering Provider     Inpatient consult to Interventional Radiology  Once     Provider:  (Not yet assigned)    Completed EDNA CURTIS     Inpatient consult to Nephrology  Once     Provider:  Williams Hart MD    Completed DARRICK ASTUDILLO III            Pending Diagnostic Studies:     Procedure Component Value Units Date/Time    Cytology Specimen-Medical Cytology (Fluid/Wash/Brush) [[id number]] Collected:  04/05/17 0500    Order Status:  Sent Lab Status:  No result     Specimen:  Other specimen location (comment)     Freeze and Hold, [facility] [[id number]] Collected:  04/05/17 0500    Order Status:  Sent Lab Status:  No result     Specimen:  Body Fluid from Thoracentesis Fluid         Final Active Diagnoses:    Diagnosis Date Noted POA    PRINCIPAL PROBLEM:  Acute hypoxemic  respiratory failure [J96.01] 04/06/2017 Yes    Pleural effusion, bilateral [J90] 04/05/2017 Yes    Metastatic renal cell carcinoma to intra-abdominal site [C79.89, C64.9] 04/06/2017 Yes    ESRD on dialysis [N18.6, Z99.2] 01/12/2017 Not Applicable    S/p nephrectomy left [Z90.5] 10/07/2016 Not Applicable     Chronic    Essential hypertension [I10] 09/23/2015 Yes    Hyperparathyroidism, secondary renal [N25.81]  Yes    Hyperuricemia without signs of inflammatory arthritis and tophaceous disease [E79.0]  Yes      Problems Resolved During this Admission:    Diagnosis Date Noted Date Resolved POA      Discharged Condition: good    Disposition: Home or Self Care    Follow Up:  Follow-up Information     Follow up with Sowmya Mccain MD On 4/7/2017.    Specialties:  Internal Medicine, Pediatrics    Why:  @1:00pm for hospital follow up with Geovanny Robledo NP, Outpatient services    Contact information:    [id number] [facility]  [place] [id number] [phone]          Medications:  Reconciled Home Medications:   Current Discharge Medication List      START taking these medications    Details   promethazine-codeine 6.25-10 mg/5 ml (PHENERGAN WITH CODEINE) 6.25-10 mg/5 mL syrup Take 2.5 mLs by mouth every 4 (four) hours as needed for Cough.  Qty: 118 mL, Refills: 0      sevelamer HCl (RENAGEL) 800 MG Tab Take 2 tablets (1,600 mg total) by mouth 3 (three) times daily with meals.  Qty: 180 tablet, Refills: 11         CONTINUE these medications which have NOT CHANGED    Details   albuterol 90 mcg/actuation inhaler Inhale 2 puffs into the lungs every 6 (six) hours as needed.  Qty: 1 Inhaler, Refills: 0    Associated Diagnoses: Wheezing; SOB (shortness of breath)      cabozantinib 60 mg Tab Take 60 mg by mouth once daily.  Qty: 30 tablet, Refills: 5    Associated Diagnoses: Renal cell carcinoma, unspecified laterality      febuxostat (ULORIC) 40 mg Tab Take 1 tablet (40 mg total) by mouth once daily.  Qty: 30 tablet, Refills:  4    Associated Diagnoses: Metastatic renal cell carcinoma, left; Malignant neoplasm metastatic to peritoneum; History of right oophorectomy; S/p nephrectomy; Encounter for chemotherapy management; Chemotherapy-induced thrombocytopenia; Anemia in ESRD (end-stage renal disease); ESRD on hemodialysis; Hypertensive kidney disease with chronic kidney disease, stage 1-4 or unspecified chronic kidney disease; Chemotherapy induced nausea and vomiting      losartan (COZAAR) 100 MG tablet Take 1 tablet (100 mg total) by mouth once daily.  Qty: 30 tablet, Refills: 5      multivitamin-Ca-iron-minerals (ONE-A-DAY WOMENS FORMULA) 27-0.4 mg Tab Take by mouth. 1 Tablet Oral Every day      nebivolol (BYSTOLIC) 5 MG Tab Take 1 tablet (5 mg total) by mouth once daily.  Qty: 30 tablet, Refills: 11      nifedipine (PROCARDIA-XL) 90 MG (OSM) TR24 Take 1 tablet (90 mg total) by mouth once daily.  Qty: 30 tablet, Refills: 5    Associated Diagnoses: Hypertensive kidney disease with chronic kidney disease, stage 1-4 or unspecified chronic kidney disease      psyllium husk-calcium (METAMUCIL PLUS CALCIUM) 1-60 gram-mg Cap Take by mouth. 2 Capsule Oral Every morning      acetaminophen (TYLENOL) 500 MG tablet Take 500 mg by mouth every 6 (six) hours as needed for Pain.      aluminum-magnesium hydroxide-simethicone (MAALOX) 200-200-20 mg/5 mL Susp Take 30 mLs by mouth every 6 (six) hours as needed.       fluticasone-salmeterol 100-50 mcg/dose (ADVAIR) 100-50 mcg/dose diskus inhaler Inhale 1 puff into the lungs 2 (two) times daily. Controller  Qty: 1 each, Refills: 0    Associated Diagnoses: Wheezing; SOB (shortness of breath)      ondansetron (ZOFRAN-ODT) 8 MG TbDL Take 1 tablet (8 mg total) by mouth every 12 (twelve) hours as needed.  Qty: 30 tablet, Refills: 1    Associated Diagnoses: Chemotherapy induced nausea and vomiting           Time spent on the discharge of patient: > 45 minutes    Martha Cifuentes MD  Department of Hospital  Medicine  [facility]

## 2017-04-06 NOTE — PLAN OF CARE
04/06/17 1140   Discharge Assessment   Assessment Type Discharge Planning Assessment   Confirmed/corrected address and phone number on facesheet? Yes   Assessment information obtained from? Patient   Expected Length of Stay (days) 1   Communicated expected length of stay with patient/caregiver yes   If Healthcare Directive is received, is it scanned into Epic? no (comment)   Prior to hospitilization cognitive status: Alert/Oriented   Prior to hospitalization functional status: Independent   Current cognitive status: Alert/Oriented   Current Functional Status: Independent   Arrived From home or self-care   Lives With spouse   Able to Return to Prior Arrangements yes   Is patient able to care for self after discharge? Yes   Does the patient have family/friends to help with healtcare needs after discharge? yes   How many people do you have in your home that can help with your care after discharge? 1   Who are your caregiver(s) and their phone number(s)?    Patient's perception of discharge disposition home or selfcare   Readmission Within The Last 30 Days no previous admission in last 30 days   Patient currently being followed by outpatient case management? No   Equipment Currently Used at Home none   Do you have any problems affording any of your prescribed medications? TBD   Does the patient have transportation to healthcare appointments? Yes   Transportation Available car   Discharge Plan A Home   Patient/Family In Agreement With Plan yes   Patient unable to complete assessment as she was entering bathroom upon TN's entrance.  TN to follow up as able.

## 2017-04-06 NOTE — PROGRESS NOTES
12 hour chart check complete. Report given to oncoming nurse. Rounding complete. Oxygen level 94% on venti mask at this time. Patient is requesting breathing treatment. Will notify respiratory.  Patient appears to be in no distress at this time. Call light is within reach. Tele monitoring in progress.

## 2017-04-06 NOTE — PLAN OF CARE
Problem: Hemodialysis (Adult)  Goal: Signs and Symptoms of Listed Potential Problems Will be Absent, Minimized or Managed (Hemodialysis)  Signs and symptoms of listed potential problems will be absent, minimized or managed by discharge/transition of care (reference Hemodialysis (Adult) CPG).  Outcome: Ongoing (interventions implemented as appropriate)    04/06/17 1609   Hemodialysis   Problems Assessed (Hemodialysis) cardiovascular complications;electrolyte imbalance;fluid imbalance   Problems Present (Hemodialysis) cardiovascular complications;electrolyte imbalance;fluid imbalance   Shanice Orellana RN

## 2017-04-06 NOTE — H&P
[facility] Medicine  History & Physical    Patient Name: Eva Bloom  MRN: [id number]  Admission Date: 4/5/2017  Attending Physician: Martha Romero MD   Primary Care Provider: Sowmya Mccain MD         Patient information was obtained from patient, spouse/SO and ER records.     Subjective:     Principal Problem:Acute hypoxemic respiratory failure    Chief Complaint:   Chief Complaint   Patient presents with    Shortness of Breath     States she is having sob.  Says it has been going on for a monht.  Initially dx with bronchitis.         HPI: 51 year old female with metastatic renal cell carcinoma (s/p left nephrectomy), ESRD on HD every TTS and malignant hypertension who presented with worsening of shortness of breath of > 2 weeks in evolution. Noticed that on 3/28 she was seen by her PCP for shortness of breath, wheezing and cough. Prescribed albuterol PRN and ADVAIR. Not on home supp O2. Shortness of breath progressed despite this treatment.  Is worse when she attempts to lying down flat or exert herself. She states that her nephrologist Dr. Hart, recently increased the dose of her BP medication. She denies fever, chills, leg swelling, chest pain. Had completed full session of dialysis day prior.     In the ED, a large right sided pleural effusion and moderate left sided pleural effusion noted via CT, which was actually done in the ambulatory setting prior to arrival to the ED. Was in distress at the time of evaluation. SaO2 at RA 88%, responding well to low flow supp oxygen. Hypertensive, slightly tachycardic and with a temp of 99F. No leukocytosis nor neutrophilia. An US-guided thoracentesis was done by IR, where 1350mL light yellow fluid were removed. Developed a small pneumothorax afterwards, that resolved (per serial XRays done throughout that night). Patient's shortness of breath much improved, but remained on low flow supp O2.         Past Medical History:    Diagnosis Date    Anemia     Bronchitis 03/01/2017    Cancer 2016    kidney cancer    CMV (cytomegalovirus) antibody positive     Essential hypertension 9/23/2015    H/O herpes simplex type 2 infection     Herpes simplex type 1 antibody positive     History of kidney cancer     s/p left nephrectomy 1/2016    Hyperparathyroidism, unspecified     Hyperuricemia without signs of inflammatory arthritis and tophaceous disease     Kidney stones     LGSIL (low grade squamous intraepithelial dysplasia)     Prediabetes     Proteinuria     Renal disorder     Thyroid nodule     Urate nephropathy        Past Surgical History:   Procedure Laterality Date    BREAST SURGERY      LUMPECTOMY    COLONOSCOPY N/A 11/12/2015    NEPHRECTOMY-LAPAROSCOPIC Left 01/12/2016    PERITONEAL CATHETER INSERTION      Permacath insertion  01/12/2017    SALPINGOOPHORECTOMY Right 2016    KJB---DAVINCI    TONSILLECTOMY      TUBAL LIGATION         Review of patient's allergies indicates:   Allergen Reactions    Allopurinol      Other reaction(s): abnormal transaminases       No current facility-administered medications on file prior to encounter.      Current Outpatient Prescriptions on File Prior to Encounter   Medication Sig    albuterol 90 mcg/actuation inhaler Inhale 2 puffs into the lungs every 6 (six) hours as needed.    cabozantinib 60 mg Tab Take 60 mg by mouth once daily.    febuxostat (ULORIC) 40 mg Tab Take 1 tablet (40 mg total) by mouth once daily.    losartan (COZAAR) 100 MG tablet Take 1 tablet (100 mg total) by mouth once daily.    multivitamin-Ca-iron-minerals (ONE-A-DAY WOMENS FORMULA) 27-0.4 mg Tab Take by mouth. 1 Tablet Oral Every day    nebivolol (BYSTOLIC) 5 MG Tab Take 1 tablet (5 mg total) by mouth once daily.    nifedipine (PROCARDIA-XL) 90 MG (OSM) TR24 Take 1 tablet (90 mg total) by mouth once daily.    psyllium husk-calcium (METAMUCIL PLUS CALCIUM) 1-60 gram-mg Cap Take by mouth. 2 Capsule  Oral Every morning    acetaminophen (TYLENOL) 500 MG tablet Take 500 mg by mouth every 6 (six) hours as needed for Pain.    aluminum-magnesium hydroxide-simethicone (MAALOX) 200-200-20 mg/5 mL Susp Take 30 mLs by mouth every 6 (six) hours as needed.     fluticasone-salmeterol 100-50 mcg/dose (ADVAIR) 100-50 mcg/dose diskus inhaler Inhale 1 puff into the lungs 2 (two) times daily. Controller    ondansetron (ZOFRAN-ODT) 8 MG TbDL Take 1 tablet (8 mg total) by mouth every 12 (twelve) hours as needed.     Family History     Problem Relation (Age of Onset)    Diabetes Father, Maternal Grandfather    Heart disease Sister    Hypertension Mother    Kidney disease Father    No Known Problems Sister, Brother, Sister, Brother        Social History Main Topics    Smoking status: Never Smoker    Smokeless tobacco: Never Used    Alcohol use No      Comment: . 2 children. works at [employer].    Drug use: No    Sexual activity: Yes     Partners: Male     Review of Systems   Constitutional: Positive for fatigue.   HENT: Negative.    Eyes: Negative.    Respiratory: Positive for cough, shortness of breath and wheezing.    Cardiovascular: Negative.    Gastrointestinal: Negative.    Endocrine: Negative.    Genitourinary: Negative.    Musculoskeletal: Negative.    Skin: Negative.    Allergic/Immunologic: Positive for immunocompromised state.   Neurological: Negative.    Hematological: Negative.    Psychiatric/Behavioral: Negative.      Objective:     Vital Signs (Most Recent):  Temp: 98 °F (36.7 °C) (04/06/17 0400)  Pulse: 86 (04/06/17 0812)  Resp: 18 (04/06/17 0812)  BP: (!) 164/94 (04/06/17 0400)  SpO2: 97 % (04/06/17 0812) Vital Signs (24h Range):  Temp:  [98 °F (36.7 °C)-99.5 °F (37.5 °C)] 98 °F (36.7 °C)  Pulse:  [82-99] 86  Resp:  [18-22] 18  SpO2:  [88 %-100 %] 97 %  BP: (148-193)/() 164/94     Weight: 64 kg (141 lb)  Body mass index is 24.2 kg/(m^2).    Physical Exam   Constitutional: She is oriented to  person, place, and time. She appears well-developed. No distress.   HENT:   Head: Normocephalic and atraumatic.   Mouth/Throat: Oropharynx is clear and moist.   Eyes: Conjunctivae and EOM are normal. No scleral icterus.   Neck: Normal range of motion.   Cardiovascular: Normal rate and regular rhythm.    Pulmonary/Chest: Effort normal. No respiratory distress. She has no wheezes. She has no rales. She exhibits no tenderness.   On NC  Absent breath sound on left lower lobe  Dry crackles on right lower lobe   Abdominal: Soft. Bowel sounds are normal.   Musculoskeletal: Normal range of motion. She exhibits no edema.   Neurological: She is alert and oriented to person, place, and time.   Skin: Skin is warm and dry. She is not diaphoretic.   Psychiatric: She has a normal mood and affect. Her behavior is normal. Judgment and thought content normal.   Vitals reviewed.       Significant Labs: All pertinent labs within the past 24 hours have been reviewed.    Significant Imaging: I have reviewed all pertinent imaging results/findings within the past 24 hours.  I have reviewed and interpreted all pertinent imaging results/findings within the past 24 hours.    Assessment/Plan:     * Acute hypoxemic respiratory failure  Progressive in nature, most likely due to developing bilateral pleural effusions (R>>L) despite routine dialysis. Is s/p diagnostic and therapeutic thoracentesis. Fluid was light-yellow in color. Labwork does not suggest acute infection and not quite exudative in nature. Will re-assess breathing and supp O2 requirements status post HD today. Consider repeat 2D Echo as outpatient (10/2016 LVEF 55%, otherwise normal Echo). EKG does meet LVH criteria.      Pleural effusion, bilateral  As above      Hyperparathyroidism, secondary renal  No acute issues      Hyperuricemia without signs of inflammatory arthritis and tophaceous disease  No acute issues. Continue febuxostat      Essential hypertension  BP not well  controlled. Restart home meds and reassess.       S/p nephrectomy left        ESRD on dialysis  Routine HD due today. Nephro consulted. Appreciate further recs      Metastatic renal cell carcinoma to intra-abdominal site  Continue cabozantinib (we do not have in-house. Ok for patient to bring her own).       VTE Risk Mitigation         Ordered     Medium Risk of VTE  Once      04/05/17 2012     Place sequential compression device  Until discontinued      04/05/17 2012     Place PING hose  Until discontinued      04/05/17 2012        Martha Cifuentes MD  Department of Hospital Medicine   [facility]

## 2017-04-06 NOTE — CONSULTS
"Unfortunate 51  y AAF with hx of RCC removed Jan 2016 and since then she has been on HD. She receives HD TTS at [facility] under Dr Hart's services. Last HD 2 days ago. Pt admitted with 3 weeks hx of cough and dyspnea worst in the last few days.     Admission chest xray showed R pleural effusion. This effusion was tapped yesterday. Fluid had 169 WBC/2 segs/Glucose 102/LD 72. Gram stain failed to show any bacteria.    Renal input requested for hd Rx    Usual Rx     180, 3 hours, 15 g needles, 450/1.5 autoflow/ On epo 10 k on hold  140/35/2K/2.5 ca. Does not tolerate more than 2 liters of fluid to be removed    Last echo 10/16 ef 55 PAP 22    Pt is awake alert oriented NAD    Denies CNS ENT GI  RHEUM OR DERM SX    CO "bronchitis" for 2-3 weeks resistant to treatment    Past Medical History:   Diagnosis Date    Anemia     Bronchitis 03/01/2017    Cancer 2016    kidney cancer    CMV (cytomegalovirus) antibody positive     Essential hypertension 9/23/2015    H/O herpes simplex type 2 infection     Herpes simplex type 1 antibody positive     History of kidney cancer     s/p left nephrectomy 1/2016    Hyperparathyroidism, unspecified     Hyperuricemia without signs of inflammatory arthritis and tophaceous disease     Kidney stones     LGSIL (low grade squamous intraepithelial dysplasia)     Prediabetes     Proteinuria     Renal disorder     Thyroid nodule     Urate nephropathy      Review of patient's allergies indicates:   Allergen Reactions    Allopurinol      Other reaction(s): abnormal transaminases       Current Facility-Administered Medications   Medication    acetaminophen tablet 650 mg    albuterol-ipratropium 2.5mg-0.5mg/3mL nebulizer solution 3 mL    cabozantinib Tab 60 mg    febuxostat tablet 40 mg    fluticasone-vilanterol 100-25 mcg/dose diskus inhaler 1 puff    losartan tablet 100 mg    nebivolol tablet 5 mg    nifedipine 30 MG ORAL TR24 24 hr tablet 90 mg "     Family History   Problem Relation Age of Onset    Hypertension Mother     Diabetes Father     Kidney disease Father     Diabetes Maternal Grandfather     No Known Problems Sister     No Known Problems Brother     Heart disease Sister     No Known Problems Sister     No Known Problems Brother     Breast cancer Neg Hx     Colon cancer Neg Hx     Cancer Neg Hx     Stroke Neg Hx      Social History     Social History    Marital status:      Spouse name: N/A    Number of children: 2    Years of education: N/A     Occupational History    [employer]     Social History Main Topics    Smoking status: Never Smoker    Smokeless tobacco: Never Used    Alcohol use No      Comment: . 2 children. works at [employer].    Drug use: No    Sexual activity: Yes     Partners: Male     Other Topics Concern    Not on file     Social History Narrative       LABS    Recent Results (from the past 24 hour(s))   CBC auto differential    Collection Time: 04/06/17  5:15 AM   Result Value Ref Range    WBC 6.53 3.90 - 12.70 K/uL    RBC 4.58 4.00 - 5.40 M/uL    Hemoglobin 12.4 12.0 - 16.0 g/dL    Hematocrit 41.2 37.0 - 48.5 %    MCV 90 82 - 98 fL    MCH 27.1 27.0 - 31.0 pg    MCHC 30.1 (L) 32.0 - 36.0 %    RDW 18.3 (H) 11.5 - 14.5 %    Platelets 224 150 - 350 K/uL    MPV 10.9 9.2 - 12.9 fL    Gran # 4.0 1.8 - 7.7 K/uL    Lymph # 1.7 1.0 - 4.8 K/uL    Mono # 0.5 0.3 - 1.0 K/uL    Eos # 0.2 0.0 - 0.5 K/uL    Baso # 0.07 0.00 - 0.20 K/uL    Gran% 61.5 38.0 - 73.0 %    Lymph% 26.0 18.0 - 48.0 %    Mono% 7.7 4.0 - 15.0 %    Eosinophil% 3.5 0.0 - 8.0 %    Basophil% 1.1 0.0 - 1.9 %    Differential Method Automated    Basic metabolic panel    Collection Time: 04/06/17  5:15 AM   Result Value Ref Range    Sodium 142 136 - 145 mmol/L    Potassium 3.6 3.5 - 5.1 mmol/L    Chloride 97 95 - 110 mmol/L    CO2 29 23 - 29 mmol/L    Glucose 85 70 - 110 mg/dL    BUN, Bld 47 (H) 6 - 20 mg/dL    Creatinine 11.0 (H)  0.5 - 1.4 mg/dL    Calcium 9.6 8.7 - 10.5 mg/dL    Anion Gap 16 8 - 16 mmol/L    eGFR if African American 4 (A) >60 mL/min/1.73 m^2    eGFR if non African American 4 (A) >60 mL/min/1.73 m^2     Past Surgical History:   Procedure Laterality Date    BREAST SURGERY      LUMPECTOMY    COLONOSCOPY N/A 11/12/2015    NEPHRECTOMY-LAPAROSCOPIC Left 01/12/2016    PERITONEAL CATHETER INSERTION      Permacath insertion  01/12/2017    SALPINGOOPHORECTOMY Right 2016    KJB---DAVINCI    TONSILLECTOMY      TUBAL LIGATION                Vitals:    04/06/17 0812 04/06/17 1135 04/06/17 1328 04/06/17 1354   BP:  (!) 155/82     Pulse: 86 94 85 80   Resp: 18 18 18 20   Temp:  98.7 °F (37.1 °C)     TempSrc:  Oral     SpO2: 97% (!) 94% 96%    Weight:       Height:           No Jvd, Thyromegaly or Lymphadenopathy  Lungs: Fairly clear anteriorly and laterally post ronchi bilaterally  Cor: RRR no G or rubs  Abd: Soft benign good bowel sounds non tender  Ext: No E C C    A)  Esrd  HTN  Anemia of ESRD well controlled  2nd hyperpth  Hx of RCC  R pleural effusion  Elevated BNP  Mild wide Agap Met acidosis  Mild protein malnutrition  Mild elevation ALT      P)    HD  Renal diet  Check po4 add binders as needed  Why is her BNP so high is unclear would check an echo and TSH  Check mag level   Check Lactic acid level  Doubt PE but is always a possibility

## 2017-04-06 NOTE — PROGRESS NOTES
Consult to Nephrology group re called this morning @approx 7:30am. Nursing has not seen MD for consult yet. I have placed several calls to dialysis dept @ [phone] no answer from dialysis staff.

## 2017-04-07 ENCOUNTER — OFFICE VISIT (OUTPATIENT)
Dept: FAMILY MEDICINE | Facility: CLINIC | Age: 52
End: 2017-04-07
Payer: COMMERCIAL

## 2017-04-07 ENCOUNTER — OFFICE VISIT (OUTPATIENT)
Dept: HEMATOLOGY/ONCOLOGY | Facility: CLINIC | Age: 52
End: 2017-04-07
Payer: COMMERCIAL

## 2017-04-07 ENCOUNTER — LAB VISIT (OUTPATIENT)
Dept: LAB | Facility: HOSPITAL | Age: 52
End: 2017-04-07
Payer: COMMERCIAL

## 2017-04-07 VITALS
WEIGHT: 134.94 LBS | BODY MASS INDEX: 23.04 KG/M2 | HEART RATE: 92 BPM | SYSTOLIC BLOOD PRESSURE: 164 MMHG | DIASTOLIC BLOOD PRESSURE: 89 MMHG | TEMPERATURE: 99 F | HEIGHT: 64 IN | RESPIRATION RATE: 16 BRPM

## 2017-04-07 VITALS
BODY MASS INDEX: 23.29 KG/M2 | SYSTOLIC BLOOD PRESSURE: 142 MMHG | DIASTOLIC BLOOD PRESSURE: 84 MMHG | HEIGHT: 64 IN | TEMPERATURE: 98 F | WEIGHT: 136.44 LBS | HEART RATE: 88 BPM | OXYGEN SATURATION: 95 %

## 2017-04-07 DIAGNOSIS — Z51.11 ENCOUNTER FOR CHEMOTHERAPY MANAGEMENT: ICD-10-CM

## 2017-04-07 DIAGNOSIS — R11.2 CHEMOTHERAPY INDUCED NAUSEA AND VOMITING: ICD-10-CM

## 2017-04-07 DIAGNOSIS — Z90.721 HISTORY OF RIGHT OOPHORECTOMY: ICD-10-CM

## 2017-04-07 DIAGNOSIS — N18.6 ANEMIA IN ESRD (END-STAGE RENAL DISEASE): ICD-10-CM

## 2017-04-07 DIAGNOSIS — D63.1 ANEMIA IN ESRD (END-STAGE RENAL DISEASE): ICD-10-CM

## 2017-04-07 DIAGNOSIS — D69.59 CHEMOTHERAPY-INDUCED THROMBOCYTOPENIA: ICD-10-CM

## 2017-04-07 DIAGNOSIS — C79.89 METASTATIC RENAL CELL CARCINOMA TO INTRA-ABDOMINAL SITE: ICD-10-CM

## 2017-04-07 DIAGNOSIS — J96.01 ACUTE HYPOXEMIC RESPIRATORY FAILURE: ICD-10-CM

## 2017-04-07 DIAGNOSIS — C64.2 METASTATIC RENAL CELL CARCINOMA, LEFT: Primary | ICD-10-CM

## 2017-04-07 DIAGNOSIS — E04.1 THYROID NODULE: ICD-10-CM

## 2017-04-07 DIAGNOSIS — N18.6 ESRD ON HEMODIALYSIS: ICD-10-CM

## 2017-04-07 DIAGNOSIS — C78.6 MALIGNANT NEOPLASM METASTATIC TO PERITONEUM: ICD-10-CM

## 2017-04-07 DIAGNOSIS — Z99.2 ESRD ON HEMODIALYSIS: ICD-10-CM

## 2017-04-07 DIAGNOSIS — I12.9 HYPERTENSIVE KIDNEY DISEASE WITH CHRONIC KIDNEY DISEASE, STAGE 1-4 OR UNSPECIFIED CHRONIC KIDNEY DISEASE: ICD-10-CM

## 2017-04-07 DIAGNOSIS — T45.1X5A CHEMOTHERAPY-INDUCED THROMBOCYTOPENIA: ICD-10-CM

## 2017-04-07 DIAGNOSIS — J90 PLEURAL EFFUSION: ICD-10-CM

## 2017-04-07 DIAGNOSIS — T45.1X5A CHEMOTHERAPY INDUCED NAUSEA AND VOMITING: ICD-10-CM

## 2017-04-07 DIAGNOSIS — C64.2 METASTATIC RENAL CELL CARCINOMA, LEFT: ICD-10-CM

## 2017-04-07 DIAGNOSIS — J90 PLEURAL EFFUSION, BILATERAL: Primary | ICD-10-CM

## 2017-04-07 DIAGNOSIS — C64.9 METASTATIC RENAL CELL CARCINOMA TO INTRA-ABDOMINAL SITE: ICD-10-CM

## 2017-04-07 DIAGNOSIS — Z90.5 S/P NEPHRECTOMY: ICD-10-CM

## 2017-04-07 LAB
ALBUMIN SERPL BCP-MCNC: 3 G/DL
ALP SERPL-CCNC: 53 U/L
ALT SERPL W/O P-5'-P-CCNC: 42 U/L
ANION GAP SERPL CALC-SCNC: 12 MMOL/L
AST SERPL-CCNC: 26 U/L
BILIRUB SERPL-MCNC: 0.9 MG/DL
BUN SERPL-MCNC: 40 MG/DL
CALCIUM SERPL-MCNC: 8.7 MG/DL
CHLORIDE SERPL-SCNC: 103 MMOL/L
CO2 SERPL-SCNC: 25 MMOL/L
CREAT SERPL-MCNC: 10 MG/DL
ERYTHROCYTE [DISTWIDTH] IN BLOOD BY AUTOMATED COUNT: 18 %
EST. GFR  (AFRICAN AMERICAN): 4.6 ML/MIN/1.73 M^2
EST. GFR  (NON AFRICAN AMERICAN): 4 ML/MIN/1.73 M^2
GLUCOSE SERPL-MCNC: 93 MG/DL
HCT VFR BLD AUTO: 42 %
HGB BLD-MCNC: 12.7 G/DL
MCH RBC QN AUTO: 27.5 PG
MCHC RBC AUTO-ENTMCNC: 30.2 %
MCV RBC AUTO: 91 FL
NEUTROPHILS # BLD AUTO: 3.5 K/UL
PLATELET # BLD AUTO: 198 K/UL
PMV BLD AUTO: 10.7 FL
POTASSIUM SERPL-SCNC: 3.8 MMOL/L
PROT SERPL-MCNC: 6.8 G/DL
RBC # BLD AUTO: 4.62 M/UL
SODIUM SERPL-SCNC: 140 MMOL/L
WBC # BLD AUTO: 5.89 K/UL

## 2017-04-07 PROCEDURE — 80053 COMPREHEN METABOLIC PANEL: CPT

## 2017-04-07 PROCEDURE — 85027 COMPLETE CBC AUTOMATED: CPT

## 2017-04-07 PROCEDURE — 36415 COLL VENOUS BLD VENIPUNCTURE: CPT

## 2017-04-07 PROCEDURE — 99999 PR PBB SHADOW E&M-EST. PATIENT-LVL III: CPT | Mod: PBBFAC,,, | Performed by: INTERNAL MEDICINE

## 2017-04-07 PROCEDURE — 99213 OFFICE O/P EST LOW 20 MIN: CPT | Mod: S$GLB,,, | Performed by: NURSE PRACTITIONER

## 2017-04-07 PROCEDURE — 99214 OFFICE O/P EST MOD 30 MIN: CPT | Mod: S$GLB,,, | Performed by: INTERNAL MEDICINE

## 2017-04-07 PROCEDURE — 1160F RVW MEDS BY RX/DR IN RCRD: CPT | Mod: S$GLB,,, | Performed by: NURSE PRACTITIONER

## 2017-04-07 PROCEDURE — 99999 PR PBB SHADOW E&M-EST. PATIENT-LVL IV: CPT | Mod: PBBFAC,,, | Performed by: NURSE PRACTITIONER

## 2017-04-07 PROCEDURE — 1160F RVW MEDS BY RX/DR IN RCRD: CPT | Mod: S$GLB,,, | Performed by: INTERNAL MEDICINE

## 2017-04-07 RX ORDER — SEVELAMER HYDROCHLORIDE 800 MG/1
1600 TABLET, FILM COATED ORAL
Qty: 180 TABLET | Refills: 11 | Status: SHIPPED | OUTPATIENT
Start: 2017-04-07 | End: 2018-09-11 | Stop reason: SDUPTHER

## 2017-04-07 NOTE — PLAN OF CARE
04/07/17 0830   Final Note   Assessment Type Final Discharge Note   Discharge Disposition Home   Discharge planning education complete? Yes   What phone number can be called within the next 1-3 days to see how you are doing after discharge? ([phone])   Hospital Follow Up  Appt(s) scheduled? Yes   Discharge plans and expectations educations in teach back method with documentation complete? Yes   Offered [facility] -- Bedside Delivery? n/a   Discharge/Hospital Encounter Summary to (non-[facility]) PCP n/a   Referral to Outpatient Case Management complete? n/a   Referral to / orders for Home Health Complete? n/a   30 day supply of medicines given at discharge, if documented non-compliance / non-adherence? n/a   Any social issues identified prior to discharge? n/a   Did you assess the readiness or willingness of the family or caregiver to support self management of care? n/a   Right Care Referral Info   Post Acute Recommendation No Care

## 2017-04-07 NOTE — PROGRESS NOTES
"Subjective:       Patient ID: Eva Bloom is a 51 y.o. female.    Chief Complaint: metastatic renal cell carcinoma    HPI   51 year old female to clinic for 4 week follow up, to review labs, and imaging for recurrent metastatic renal cell carcinoma. Pt is currently taking cabozantinib daily. Pt is on ESRD on HD every TTS. Of note, she was admitted for worsening of shortness of breath of > 2 weeks on 4/5/17. Noticed that on 3/28 she was seen by her PCP for shortness of breath, wheezing and cough. Prescribed albuterol PRN and ADVAIR. Not on home supp O2. Shortness of breath progressed despite this treatment and worse when she attempted to lay flat or exert herself. Imaging revealed a large right sided pleural effusion and moderate left sided pleural effusion noted via CT. An US-guided thoracentesis was done by IR, where 1350mL light yellow fluid were removed. Developed a small pneumothorax afterwards, that resolved (per serial XRays done throughout that night). Pt reports immediate improvement in SOB after thoracentesis. She reports she feels well today.    Today, she denies any mouth sores, nausea, vomiting, diarrhea, constipation,weight loss, chest pain, leg swelling, headache, dizziness, or mood changes.    ECOG 2. She is accompanied to clinic with her .    Oncologic History (From Chart and Patient):  Eva Bloom is a 51 y.o. female with ESRD on HD who was found to have two L renal masses. She underwent L lap nephrectomy on 1/12/16. 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 Margins were negative.  Shehealed from surgery well, but then developed a large ovarian mass.  This was removed by gyn along with multiple sites of metastatic disease in the pelvis.  Path was c/w recurrence of RCC.     11/20/16 Pelvic ultrasound reveals "Large heterogeneous cystic and solid mass which appears " "to arise from the right ovary with worrisome imaging features for malignancy.  Differential diagnosis includes malignant tumors such as serous or mucinous cystadenocarcinoma.  It is important to note that the patient is at risk for ovarian torsion due to the size of the mass.Multiple uterine fibroids are identified with the largest in the uterine fundus."    11/22/16 pathology reveals "FINAL PATHOLOGIC DIAGNOSIS-RIGHT OVARY AND FALLOPIAN TUBE, RIGHT SALPINGO-OOPHORECTOMY:  -Positive for malignancy, high grade carcinoma morphologically and immunohistochemically consistent with metastasis from the patient's known renal cell carcinoma"    1/23/17 MRI abdomen reveals "Status post left nephrectomy, the left nephrectomy bed appears normal.There is a small liver lesion and a small splenic lesion, they cannot be fully characterized but they are unchanged from 5/27/16 suggestive of benign lesions.Multiple right kidney cysts.Cholelithiasis.Soft tissue oval density noted within the mesentery adjacent to the aorta right iliac bifurcation, nonspecific, could represent an enlarged lymph node, it is unchanged from prior studies"    1/23/17- CT Chest reveals "Stable pulmonary nodules, likely benign. No new pulmonary parenchymal abnormalities are identified. Enlarging nodular foci in partially visualized left upper abdomen. Peritoneal implants not excluded. Multiple thyroid nodules, measuring up to 1.6 cm."    4/5/17-MRI abdomen reveals "Again seen are the T2 hyperintense hepatic and splenic lesions that appear similar to prior exam and possibly represents hemangiomas. There are right renal cysts and the patient is status post left nephrectomy."    Review of Systems   Constitutional: Positive for appetite change and fatigue. Negative for activity change, chills and fever.   HENT: Negative for congestion, hearing loss, mouth sores, sore throat, tinnitus and voice change.    Eyes: Negative for pain and visual disturbance. "   Respiratory: Positive for cough and shortness of breath (improving). Negative for wheezing.    Cardiovascular: Negative for chest pain, palpitations and leg swelling.   Gastrointestinal: Negative for abdominal pain, constipation, diarrhea, nausea and vomiting.   Endocrine: Negative for cold intolerance and heat intolerance.   Genitourinary: Negative for difficulty urinating, dyspareunia, dysuria, frequency, menstrual problem, urgency, vaginal bleeding, vaginal discharge and vaginal pain.   Musculoskeletal: Negative for arthralgias and myalgias.   Skin: Negative for color change, rash and wound.   Allergic/Immunologic: Negative for environmental allergies and food allergies.   Neurological: Negative for weakness, numbness and headaches.   Hematological: Negative for adenopathy. Does not bruise/bleed easily.   Psychiatric/Behavioral: Negative for agitation, confusion, hallucinations and sleep disturbance. The patient is not nervous/anxious.    All other systems reviewed and are negative.      Allergies:  Review of patient's allergies indicates:   Allergen Reactions    Allopurinol      Other reaction(s): abnormal transaminases       Medications:  Current Outpatient Prescriptions   Medication Sig Dispense Refill    acetaminophen (TYLENOL) 500 MG tablet Take 500 mg by mouth every 6 (six) hours as needed for Pain.      albuterol 90 mcg/actuation inhaler Inhale 2 puffs into the lungs every 6 (six) hours as needed. 1 Inhaler 0    aluminum-magnesium hydroxide-simethicone (MAALOX) 200-200-20 mg/5 mL Susp Take 30 mLs by mouth every 6 (six) hours as needed.       cabozantinib 60 mg Tab Take 60 mg by mouth once daily. 30 tablet 5    febuxostat (ULORIC) 40 mg Tab Take 1 tablet (40 mg total) by mouth once daily. 30 tablet 4    fluticasone-salmeterol 100-50 mcg/dose (ADVAIR) 100-50 mcg/dose diskus inhaler Inhale 1 puff into the lungs 2 (two) times daily. Controller 1 each 0    losartan (COZAAR) 100 MG tablet Take 1 tablet  (100 mg total) by mouth once daily. 30 tablet 5    multivitamin-Ca-iron-minerals (ONE-A-DAY WOMENS FORMULA) 27-0.4 mg Tab Take by mouth. 1 Tablet Oral Every day      nebivolol (BYSTOLIC) 5 MG Tab Take 1 tablet (5 mg total) by mouth once daily. 30 tablet 11    nifedipine (PROCARDIA-XL) 90 MG (OSM) TR24 Take 1 tablet (90 mg total) by mouth once daily. 30 tablet 5    ondansetron (ZOFRAN-ODT) 8 MG TbDL Take 1 tablet (8 mg total) by mouth every 12 (twelve) hours as needed. 30 tablet 1    promethazine-codeine 6.25-10 mg/5 ml (PHENERGAN WITH CODEINE) 6.25-10 mg/5 mL syrup Take 2.5 mLs by mouth every 4 (four) hours as needed for Cough. 118 mL 0    psyllium husk-calcium (METAMUCIL PLUS CALCIUM) 1-60 gram-mg Cap Take by mouth. 2 Capsule Oral Every morning      sevelamer HCl (RENAGEL) 800 MG Tab Take 2 tablets (1,600 mg total) by mouth 3 (three) times daily with meals. 180 tablet 11     No current facility-administered medications for this visit.        PMH:  Past Medical History:   Diagnosis Date    Anemia     Bronchitis 03/01/2017    Cancer 2016    kidney cancer    CMV (cytomegalovirus) antibody positive     Essential hypertension 9/23/2015    H/O herpes simplex type 2 infection     Herpes simplex type 1 antibody positive     History of kidney cancer     s/p left nephrectomy 1/2016    Hyperparathyroidism, unspecified     Hyperuricemia without signs of inflammatory arthritis and tophaceous disease     Kidney stones     LGSIL (low grade squamous intraepithelial dysplasia)     Prediabetes     Proteinuria     Renal disorder     Thyroid nodule     Urate nephropathy        PSH:  Past Surgical History:   Procedure Laterality Date    BREAST SURGERY      LUMPECTOMY    COLONOSCOPY N/A 11/12/2015    NEPHRECTOMY-LAPAROSCOPIC Left 01/12/2016    PERITONEAL CATHETER INSERTION      Permacath insertion  01/12/2017    SALPINGOOPHORECTOMY Right 2016    KJB---DAVINCI    TONSILLECTOMY      TUBAL LIGATION          FamHx:  Family History   Problem Relation Age of Onset    Hypertension Mother     Diabetes Father     Kidney disease Father     Diabetes Maternal Grandfather     No Known Problems Sister     No Known Problems Brother     Heart disease Sister     No Known Problems Sister     No Known Problems Brother     Breast cancer Neg Hx     Colon cancer Neg Hx     Cancer Neg Hx     Stroke Neg Hx        SocHx:  Social History     Social History    Marital status:      Spouse name: N/A    Number of children: 2    Years of education: N/A     Occupational History    [employer] #911     Social History Main Topics    Smoking status: Never Smoker    Smokeless tobacco: Never Used    Alcohol use No      Comment: . 2 children. works at [employer].    Drug use: No    Sexual activity: Yes     Partners: Male     Other Topics Concern    Not on file     Social History Narrative         Objective:      Physical Exam   Constitutional: She is oriented to person, place, and time. She appears well-developed and well-nourished. No distress.   HENT:   Head: Normocephalic and atraumatic.   Nose: Nose normal.   Mouth/Throat: Oropharynx is clear and moist. No oropharyngeal exudate.   Eyes: Conjunctivae and EOM are normal. Pupils are equal, round, and reactive to light. Right eye exhibits no discharge. Left eye exhibits no discharge.   Neck: Normal range of motion. Neck supple. No tracheal deviation present.   Cardiovascular: Normal rate, regular rhythm and normal heart sounds.  Exam reveals no gallop and no friction rub.    No murmur heard.  Pulmonary/Chest: Effort normal and breath sounds normal. No respiratory distress. She has no wheezes. She has no rales.   Abdominal: Soft. Bowel sounds are normal. She exhibits no distension. There is no tenderness. There is no rebound and no guarding.   Genitourinary: No breast tenderness, discharge or bleeding.   Musculoskeletal: Normal range of motion. She  exhibits no edema or tenderness.   Neurological: She is alert and oriented to person, place, and time. She has normal reflexes. No cranial nerve deficit. Coordination normal.   Skin: Skin is warm and dry. No rash noted. She is not diaphoretic. No erythema. No pallor.   Psychiatric: She has a normal mood and affect. Her behavior is normal. Thought content normal.   Nursing note and vitals reviewed.      LABS:  WBC   Date Value Ref Range Status   04/07/2017 5.89 3.90 - 12.70 K/uL Final     Hemoglobin   Date Value Ref Range Status   04/07/2017 12.7 12.0 - 16.0 g/dL Final     Hematocrit   Date Value Ref Range Status   04/07/2017 42.0 37.0 - 48.5 % Final     Platelets   Date Value Ref Range Status   04/07/2017 198 150 - 350 K/uL Final       Chemistry        Component Value Date/Time     04/07/2017 0837    K 3.8 04/07/2017 0837     04/07/2017 0837    CO2 25 04/07/2017 0837    BUN 40 (H) 04/07/2017 0837    CREATININE 10.0 (H) 04/07/2017 0837    GLU 93 04/07/2017 0837        Component Value Date/Time    CALCIUM 8.7 04/07/2017 0837    ALKPHOS 53 (L) 04/07/2017 0837    AST 26 04/07/2017 0837    ALT 42 04/07/2017 0837    BILITOT 0.9 04/07/2017 0837            Assessment:       1. Metastatic renal cell carcinoma, left    2. Malignant neoplasm metastatic to peritoneum    3. History of right oophorectomy    4. S/p nephrectomy left    5. Encounter for chemotherapy management    6. Anemia in ESRD (end-stage renal disease)    7. ESRD on hemodialysis    8. Hypertensive kidney disease with chronic kidney disease, stage 1-4 or unspecified chronic kidney disease    9. Thyroid nodule    10. Pleural effusion        Plan:     1,2,3,4,5- Metastatic Renal Cell Carcinoma:    - It is unlikely that surgery removed all sites of metastatic disease. Given that this is c/w her previous papillary disease, pt started on oral cabozantinib 60mg daily (for dual VEGF and MET inhibition), especially given recent data that shows cabo is far  superior to student in the first line setting.  If pt progresses on this regimen, will switch to nivolumab.     Restaging scans show stable disease and pt tolerating cabozantinib relatively well. Continue cabozantinib for now. Will await cytology from thoracentesis to determine if cancer related. RTC in 4 weeks with labs (CBC,CMP) and to see me.     6,7- Improved with Procit. Pt informed she should not receive Procrit if her h&h is greater than 10 an 30.    7,8,-Elevated today. Will monitor. Pt reports compliance with meds.     9- Had thyroid US and biopsy last year, which was negative. Will monitor for now.    10- Drained 1350ml with thoracentesis with immediate improvement of symptoms. WIll order 2d echo to rule out cardiac involvement.       Patient was also seen and examined by Dr. Rivera. Patient is in agreement with the proposed treatment plan. All questions were answered to the patient's satisfaction. Pt knows to call clinic if anything is needed before the next clinic visit.    NAS [facility]  Hematology and Medical Oncology      I have reviewed the notes, assessments, and/or procedures performed by the nurse practitioner, as above.  I have personally interviewed the patient at the beside, and rounded with the nurse practitioner. I formulated the plan of care.  I concur with her documentation of Eva Bloom.  More than 25 mins were spent during this encounter, greater than 50% was spent in direct counseling and/or coordination of care.     Ben Rivera M.D., M.S., F.A.C.P.  Hematology/Oncology Attending  [facility]

## 2017-04-07 NOTE — Clinical Note
FYI: Hospital F/U. Pleural effusion. I can't see cytology. Is there a preliminary marker I am missing that would indicate no need for cytology?

## 2017-04-07 NOTE — PROGRESS NOTES
This dictation has been generated using Dragon Dictation some phonetic errors may occur.     Eva was seen today for hospital follow up.    Diagnoses and all orders for this visit:    Pleural effusion, bilateral    Acute hypoxemic respiratory failure    Metastatic renal cell carcinoma to intra-abdominal site      History of pleural effusion bilateral s/p thoracentesis.  S/S resolved after tap.  No SOB or WARREN currently. ESD pending as requested by hospitalist and ordered by Heme/Onc. Discussed appropriate self referral to ED for SOB, WARREN, Fever, Chills, or chest pain.     Return if symptoms worsen or fail to improve.      ________________________________________________________________  ________________________________________________________________        Chief Complaint   Patient presents with    Hospital Follow Up     History of present illness  This 51 y.o. presents today for complaint of follow-up from hospital.  Patient had shortness of breath.  Saw Lyubov in primary care.  Outpatient therapies for shortness of breath were ineffective.  Patient presented to the emergency room and on CT bilateral pleural effusion noted.  Patient had thoracentesis by interventional radiology.  No evidence of infection.  Heart failure was considered and outpatient ESD advised.    Reviewed DC summary, CT, fluid studies.   ROS  NO FC  No CP. No SOB. No WARREN.  Denies peripheral edema.     Past medical social surgical histories reviewed.     Past Medical History:   Diagnosis Date    Anemia     Bronchitis 03/01/2017    Cancer 2016    kidney cancer    CMV (cytomegalovirus) antibody positive     Essential hypertension 9/23/2015    H/O herpes simplex type 2 infection     Herpes simplex type 1 antibody positive     History of kidney cancer     s/p left nephrectomy 1/2016    Hyperparathyroidism, unspecified     Hyperuricemia without signs of inflammatory arthritis and tophaceous disease     Kidney stones     LGSIL (low grade  squamous intraepithelial dysplasia)     Prediabetes     Proteinuria     Renal disorder     Thyroid nodule     Urate nephropathy        Past Surgical History:   Procedure Laterality Date    BREAST SURGERY      LUMPECTOMY    COLONOSCOPY N/A 11/12/2015    NEPHRECTOMY-LAPAROSCOPIC Left 01/12/2016    PERITONEAL CATHETER INSERTION      Permacath insertion  01/12/2017    SALPINGOOPHORECTOMY Right 2016    [facility]---DAVINCI    TONSILLECTOMY      TUBAL LIGATION         Family History   Problem Relation Age of Onset    Hypertension Mother     Diabetes Father     Kidney disease Father     Diabetes Maternal Grandfather     No Known Problems Sister     No Known Problems Brother     Heart disease Sister     No Known Problems Sister     No Known Problems Brother     Breast cancer Neg Hx     Colon cancer Neg Hx     Cancer Neg Hx     Stroke Neg Hx        Social History     Social History    Marital status:      Spouse name: N/A    Number of children: 2    Years of education: N/A     Occupational History    [employer]     Social History Main Topics    Smoking status: Never Smoker    Smokeless tobacco: Never Used    Alcohol use No      Comment: . 2 children. works at [employer].    Drug use: No    Sexual activity: Yes     Partners: Male     Other Topics Concern    None     Social History Narrative       Current Outpatient Prescriptions   Medication Sig Dispense Refill    acetaminophen (TYLENOL) 500 MG tablet Take 500 mg by mouth every 6 (six) hours as needed for Pain.      aluminum-magnesium hydroxide-simethicone (MAALOX) 200-200-20 mg/5 mL Susp Take 30 mLs by mouth every 6 (six) hours as needed.       cabozantinib 60 mg Tab Take 60 mg by mouth once daily. 30 tablet 5    febuxostat (ULORIC) 40 mg Tab Take 1 tablet (40 mg total) by mouth once daily. 30 tablet 4    losartan (COZAAR) 100 MG tablet Take 1 tablet (100 mg total) by mouth once daily. 30 tablet 5     multivitamin-Ca-iron-minerals (ONE-A-DAY WOMENS FORMULA) 27-0.4 mg Tab Take by mouth. 1 Tablet Oral Every day      nebivolol (BYSTOLIC) 5 MG Tab Take 1 tablet (5 mg total) by mouth once daily. 30 tablet 11    nifedipine (PROCARDIA-XL) 90 MG (OSM) TR24 Take 1 tablet (90 mg total) by mouth once daily. 30 tablet 5    ondansetron (ZOFRAN-ODT) 8 MG TbDL Take 1 tablet (8 mg total) by mouth every 12 (twelve) hours as needed. 30 tablet 1    promethazine-codeine 6.25-10 mg/5 ml (PHENERGAN WITH CODEINE) 6.25-10 mg/5 mL syrup Take 2.5 mLs by mouth every 4 (four) hours as needed for Cough. 118 mL 0    psyllium husk-calcium (METAMUCIL PLUS CALCIUM) 1-60 gram-mg Cap Take by mouth. 2 Capsule Oral Every morning      sevelamer HCl (RENAGEL) 800 MG Tab Take 2 tablets (1,600 mg total) by mouth 3 (three) times daily with meals. 180 tablet 11     No current facility-administered medications for this visit.        Review of patient's allergies indicates:   Allergen Reactions    Allopurinol      Other reaction(s): abnormal transaminases       Physical examination  Vitals Reviewed  Gen. Well-dressed well-nourished no apparent distress  Skin warm dry and intact.  No rashes noted.  Chest.  Respirations are even unlabored.  Lungs are clear to auscultation.  Cardiac regular rate and rhythm.  No chest wall adenopathy noted.  Neuro. Awake alert oriented x4.  Normal judgment and cognition noted.  Extremities no clubbing cyanosis or edema noted.     Call or return to clinic prn if these symptoms worsen or fail to improve as anticipated.

## 2017-04-09 LAB
BACTERIA FLD AEROBE CULT: NO GROWTH
GRAM STN SPEC: NORMAL

## 2017-04-19 ENCOUNTER — PATIENT MESSAGE (OUTPATIENT)
Dept: FAMILY MEDICINE | Facility: CLINIC | Age: 52
End: 2017-04-19

## 2017-04-24 ENCOUNTER — PATIENT MESSAGE (OUTPATIENT)
Dept: HEMATOLOGY/ONCOLOGY | Facility: CLINIC | Age: 52
End: 2017-04-24

## 2017-05-03 ENCOUNTER — HOSPITAL ENCOUNTER (OUTPATIENT)
Dept: CARDIOLOGY | Facility: CLINIC | Age: 52
Discharge: HOME | End: 2017-05-03
Payer: COMMERCIAL

## 2017-05-03 ENCOUNTER — OFFICE VISIT (OUTPATIENT)
Dept: HEMATOLOGY/ONCOLOGY | Facility: CLINIC | Age: 52
End: 2017-05-03
Payer: COMMERCIAL

## 2017-05-03 ENCOUNTER — LAB VISIT (OUTPATIENT)
Dept: LAB | Facility: HOSPITAL | Age: 52
End: 2017-05-03
Payer: COMMERCIAL

## 2017-05-03 VITALS
TEMPERATURE: 98 F | HEART RATE: 82 BPM | WEIGHT: 138.25 LBS | DIASTOLIC BLOOD PRESSURE: 90 MMHG | OXYGEN SATURATION: 100 % | HEIGHT: 64 IN | RESPIRATION RATE: 18 BRPM | BODY MASS INDEX: 23.6 KG/M2 | SYSTOLIC BLOOD PRESSURE: 165 MMHG

## 2017-05-03 DIAGNOSIS — Z90.5 S/P NEPHRECTOMY: ICD-10-CM

## 2017-05-03 DIAGNOSIS — C64.2 METASTATIC RENAL CELL CARCINOMA, LEFT: Primary | ICD-10-CM

## 2017-05-03 DIAGNOSIS — C78.6 MALIGNANT NEOPLASM METASTATIC TO PERITONEUM: ICD-10-CM

## 2017-05-03 DIAGNOSIS — R79.89 ELEVATED LFTS: ICD-10-CM

## 2017-05-03 DIAGNOSIS — N18.6 ESRD ON HEMODIALYSIS: ICD-10-CM

## 2017-05-03 DIAGNOSIS — Z51.11 ENCOUNTER FOR CHEMOTHERAPY MANAGEMENT: ICD-10-CM

## 2017-05-03 DIAGNOSIS — C64.2 METASTATIC RENAL CELL CARCINOMA, LEFT: ICD-10-CM

## 2017-05-03 DIAGNOSIS — Z99.2 ESRD ON HEMODIALYSIS: ICD-10-CM

## 2017-05-03 DIAGNOSIS — I12.9 HYPERTENSIVE KIDNEY DISEASE WITH CHRONIC KIDNEY DISEASE, STAGE 1-4 OR UNSPECIFIED CHRONIC KIDNEY DISEASE: ICD-10-CM

## 2017-05-03 DIAGNOSIS — N18.6 ANEMIA IN ESRD (END-STAGE RENAL DISEASE): ICD-10-CM

## 2017-05-03 DIAGNOSIS — Z90.721 HISTORY OF RIGHT OOPHORECTOMY: ICD-10-CM

## 2017-05-03 DIAGNOSIS — E04.1 THYROID NODULE: ICD-10-CM

## 2017-05-03 DIAGNOSIS — D63.1 ANEMIA IN ESRD (END-STAGE RENAL DISEASE): ICD-10-CM

## 2017-05-03 DIAGNOSIS — I51.7 CARDIOMEGALY: ICD-10-CM

## 2017-05-03 DIAGNOSIS — R93.1 ABNORMAL ECHOCARDIOGRAM: ICD-10-CM

## 2017-05-03 LAB
ALBUMIN SERPL BCP-MCNC: 2.9 G/DL
ALP SERPL-CCNC: 58 U/L
ALT SERPL W/O P-5'-P-CCNC: 75 U/L
ANION GAP SERPL CALC-SCNC: 14 MMOL/L
AORTIC VALVE REGURGITATION: ABNORMAL
AST SERPL-CCNC: 48 U/L
BILIRUB SERPL-MCNC: 0.7 MG/DL
BUN SERPL-MCNC: 40 MG/DL
CALCIUM SERPL-MCNC: 9.2 MG/DL
CHLORIDE SERPL-SCNC: 98 MMOL/L
CO2 SERPL-SCNC: 30 MMOL/L
CREAT SERPL-MCNC: 8.4 MG/DL
ERYTHROCYTE [DISTWIDTH] IN BLOOD BY AUTOMATED COUNT: 18.7 %
EST. GFR  (AFRICAN AMERICAN): 5.7 ML/MIN/1.73 M^2
EST. GFR  (NON AFRICAN AMERICAN): 5 ML/MIN/1.73 M^2
ESTIMATED PA SYSTOLIC PRESSURE: 53.97
GLOBAL PERICARDIAL EFFUSION: ABNORMAL
GLUCOSE SERPL-MCNC: 104 MG/DL
HCT VFR BLD AUTO: 41.7 %
HGB BLD-MCNC: 13 G/DL
MCH RBC QN AUTO: 27.9 PG
MCHC RBC AUTO-ENTMCNC: 31.2 %
MCV RBC AUTO: 90 FL
MITRAL VALVE REGURGITATION: ABNORMAL
NEUTROPHILS # BLD AUTO: 1.4 K/UL
PLATELET # BLD AUTO: 151 K/UL
PMV BLD AUTO: 10.7 FL
POTASSIUM SERPL-SCNC: 4.1 MMOL/L
PROT SERPL-MCNC: 6.1 G/DL
RBC # BLD AUTO: 4.66 M/UL
RETIRED EF AND QEF - SEE NOTES: 35 (ref 55–65)
SODIUM SERPL-SCNC: 142 MMOL/L
TRICUSPID VALVE REGURGITATION: ABNORMAL
WBC # BLD AUTO: 4.61 K/UL

## 2017-05-03 PROCEDURE — 99999 PR PBB SHADOW E&M-EST. PATIENT-LVL IV: CPT | Mod: PBBFAC,,, | Performed by: NURSE PRACTITIONER

## 2017-05-03 PROCEDURE — 0399T 2D ECHO WITH COLOR FLOW DOPPLER: CPT | Mod: NTX,S$GLB,, | Performed by: INTERNAL MEDICINE

## 2017-05-03 PROCEDURE — 85027 COMPLETE CBC AUTOMATED: CPT | Mod: TXP

## 2017-05-03 PROCEDURE — 99214 OFFICE O/P EST MOD 30 MIN: CPT | Mod: S$GLB,,, | Performed by: NURSE PRACTITIONER

## 2017-05-03 PROCEDURE — 36415 COLL VENOUS BLD VENIPUNCTURE: CPT | Mod: NTX

## 2017-05-03 PROCEDURE — 93306 TTE W/DOPPLER COMPLETE: CPT | Mod: NTX,S$GLB,, | Performed by: INTERNAL MEDICINE

## 2017-05-03 PROCEDURE — 80053 COMPREHEN METABOLIC PANEL: CPT | Mod: TXP

## 2017-05-03 PROCEDURE — 1160F RVW MEDS BY RX/DR IN RCRD: CPT | Mod: S$GLB,,, | Performed by: NURSE PRACTITIONER

## 2017-05-03 NOTE — PROGRESS NOTES
"Subjective:       Patient ID: Eva Bloom is a 51 y.o. female.    Chief Complaint: renal cell cancer and Results (labs/echo)    HPI   51 year old female to clinic for 4 week follow up and to review labs for recurrent metastatic renal cell carcinoma. Pt is currently taking cabozantinib daily. Pt is on ESRD on HD every TTS.  She reports she feels well today and has no new issues to report.    Today, she denies any mouth sores, nausea, vomiting, diarrhea, constipation,weight loss, chest pain, leg swelling, headache, dizziness, or mood changes.    ECOG 2. She is accompanied to clinic with her .    Oncologic History (From Chart and Patient):  Eva Bloom is a 51 y.o. female with ESRD on HD who was found to have two L renal masses. She underwent L lap nephrectomy on 1/12/16. 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 Margins were negative.  Shehealed from surgery well, but then developed a large ovarian mass.  This was removed by gyn along with multiple sites of metastatic disease in the pelvis.  Path was c/w recurrence of RCC.     11/20/16 Pelvic ultrasound reveals "Large heterogeneous cystic and solid mass which appears to arise from the right ovary with worrisome imaging features for malignancy.  Differential diagnosis includes malignant tumors such as serous or mucinous cystadenocarcinoma.  It is important to note that the patient is at risk for ovarian torsion due to the size of the mass.Multiple uterine fibroids are identified with the largest in the uterine fundus."    11/22/16 pathology reveals "FINAL PATHOLOGIC DIAGNOSIS-RIGHT OVARY AND FALLOPIAN TUBE, RIGHT SALPINGO-OOPHORECTOMY:  -Positive for malignancy, high grade carcinoma morphologically and immunohistochemically consistent with metastasis from the patient's known renal cell carcinoma"    1/23/17 MRI abdomen reveals " ""Status post left nephrectomy, the left nephrectomy bed appears normal.There is a small liver lesion and a small splenic lesion, they cannot be fully characterized but they are unchanged from 5/27/16 suggestive of benign lesions.Multiple right kidney cysts.Cholelithiasis.Soft tissue oval density noted within the mesentery adjacent to the aorta right iliac bifurcation, nonspecific, could represent an enlarged lymph node, it is unchanged from prior studies"    1/23/17- CT Chest reveals "Stable pulmonary nodules, likely benign. No new pulmonary parenchymal abnormalities are identified. Enlarging nodular foci in partially visualized left upper abdomen. Peritoneal implants not excluded. Multiple thyroid nodules, measuring up to 1.6 cm."    4/5/17-MRI abdomen reveals "Again seen are the T2 hyperintense hepatic and splenic lesions that appear similar to prior exam and possibly represents hemangiomas. There are right renal cysts and the patient is status post left nephrectomy."    Review of Systems   Constitutional: Positive for appetite change and fatigue. Negative for activity change, chills and fever.   HENT: Negative for congestion, hearing loss, mouth sores, sore throat, tinnitus and voice change.    Eyes: Negative for pain and visual disturbance.   Respiratory: Positive for cough and shortness of breath (improving). Negative for wheezing.    Cardiovascular: Negative for chest pain, palpitations and leg swelling.   Gastrointestinal: Negative for abdominal pain, constipation, diarrhea, nausea and vomiting.   Endocrine: Negative for cold intolerance and heat intolerance.   Genitourinary: Negative for difficulty urinating, dyspareunia, dysuria, frequency, menstrual problem, urgency, vaginal bleeding, vaginal discharge and vaginal pain.   Musculoskeletal: Negative for arthralgias and myalgias.   Skin: Negative for color change, rash and wound.   Allergic/Immunologic: Negative for environmental allergies and food allergies. "   Neurological: Negative for weakness, numbness and headaches.   Hematological: Negative for adenopathy. Does not bruise/bleed easily.   Psychiatric/Behavioral: Negative for agitation, confusion, hallucinations and sleep disturbance. The patient is not nervous/anxious.    All other systems reviewed and are negative.      Allergies:  Review of patient's allergies indicates:   Allergen Reactions    Allopurinol      Other reaction(s): abnormal transaminases       Medications:  Current Outpatient Prescriptions   Medication Sig Dispense Refill    acetaminophen (TYLENOL) 500 MG tablet Take 500 mg by mouth every 6 (six) hours as needed for Pain.      aluminum-magnesium hydroxide-simethicone (MAALOX) 200-200-20 mg/5 mL Susp Take 30 mLs by mouth every 6 (six) hours as needed.       cabozantinib 60 mg Tab Take 60 mg by mouth once daily. 30 tablet 5    febuxostat (ULORIC) 40 mg Tab Take 1 tablet (40 mg total) by mouth once daily. 30 tablet 4    losartan (COZAAR) 100 MG tablet Take 1 tablet (100 mg total) by mouth once daily. 30 tablet 5    multivitamin-Ca-iron-minerals (ONE-A-DAY WOMENS FORMULA) 27-0.4 mg Tab Take by mouth. 1 Tablet Oral Every day      nebivolol (BYSTOLIC) 5 MG Tab Take 1 tablet (5 mg total) by mouth once daily. 30 tablet 11    nifedipine (PROCARDIA-XL) 90 MG (OSM) TR24 Take 1 tablet (90 mg total) by mouth once daily. 30 tablet 5    ondansetron (ZOFRAN-ODT) 8 MG TbDL Take 1 tablet (8 mg total) by mouth every 12 (twelve) hours as needed. 30 tablet 1    psyllium husk-calcium (METAMUCIL PLUS CALCIUM) 1-60 gram-mg Cap Take by mouth. 2 Capsule Oral Every morning      sevelamer HCl (RENAGEL) 800 MG Tab Take 2 tablets (1,600 mg total) by mouth 3 (three) times daily with meals. 180 tablet 11     No current facility-administered medications for this visit.        PMH:  Past Medical History:   Diagnosis Date    Anemia     Bronchitis 03/01/2017    Cancer 2016    kidney cancer    CMV (cytomegalovirus)  antibody positive     Essential hypertension 9/23/2015    H/O herpes simplex type 2 infection     Herpes simplex type 1 antibody positive     History of kidney cancer     s/p left nephrectomy 1/2016    Hyperparathyroidism, unspecified     Hyperuricemia without signs of inflammatory arthritis and tophaceous disease     Kidney stones     LGSIL (low grade squamous intraepithelial dysplasia)     Prediabetes     Proteinuria     Renal disorder     Thyroid nodule     Urate nephropathy        PSH:  Past Surgical History:   Procedure Laterality Date    BREAST SURGERY      LUMPECTOMY    COLONOSCOPY N/A 11/12/2015    NEPHRECTOMY-LAPAROSCOPIC Left 01/12/2016    PERITONEAL CATHETER INSERTION      Permacath insertion  01/12/2017    SALPINGOOPHORECTOMY Right 2016    [facility]---DAVINCI    TONSILLECTOMY      TUBAL LIGATION         FamHx:  Family History   Problem Relation Age of Onset    Hypertension Mother     Diabetes Father     Kidney disease Father     Diabetes Maternal Grandfather     No Known Problems Sister     No Known Problems Brother     Heart disease Sister     No Known Problems Sister     No Known Problems Brother     Breast cancer Neg Hx     Colon cancer Neg Hx     Cancer Neg Hx     Stroke Neg Hx        SocHx:  Social History     Social History    Marital status:      Spouse name: N/A    Number of children: 2    Years of education: N/A     Occupational History    [employer]     Social History Main Topics    Smoking status: Never Smoker    Smokeless tobacco: Never Used    Alcohol use No      Comment: . 2 children. works at [employer].    Drug use: No    Sexual activity: Yes     Partners: Male     Other Topics Concern    Not on file     Social History Narrative         Objective:      Physical Exam   Constitutional: She is oriented to person, place, and time. She appears well-developed and well-nourished. No distress.   HENT:   Head: Normocephalic and  atraumatic.   Nose: Nose normal.   Mouth/Throat: Oropharynx is clear and moist. No oropharyngeal exudate.   Eyes: Conjunctivae and EOM are normal. Pupils are equal, round, and reactive to light. Right eye exhibits no discharge. Left eye exhibits no discharge.   Neck: Normal range of motion. Neck supple. No tracheal deviation present.   Cardiovascular: Normal rate, regular rhythm and normal heart sounds.  Exam reveals no gallop and no friction rub.    No murmur heard.  Pulmonary/Chest: Effort normal and breath sounds normal. No respiratory distress. She has no wheezes. She has no rales.   Abdominal: Soft. Bowel sounds are normal. She exhibits no distension. There is no tenderness. There is no rebound and no guarding.   Genitourinary: No breast tenderness, discharge or bleeding.   Musculoskeletal: Normal range of motion. She exhibits no edema or tenderness.   Neurological: She is alert and oriented to person, place, and time. She has normal reflexes. No cranial nerve deficit. Coordination normal.   Skin: Skin is warm and dry. No rash noted. She is not diaphoretic. No erythema. No pallor.   Psychiatric: She has a normal mood and affect. Her behavior is normal. Thought content normal.   Nursing note and vitals reviewed.      LABS:  WBC   Date Value Ref Range Status   05/03/2017 4.61 3.90 - 12.70 K/uL Final     Hemoglobin   Date Value Ref Range Status   05/03/2017 13.0 12.0 - 16.0 g/dL Final     Hematocrit   Date Value Ref Range Status   05/03/2017 41.7 37.0 - 48.5 % Final     Platelets   Date Value Ref Range Status   05/03/2017 151 150 - 350 K/uL Final       Chemistry        Component Value Date/Time     05/03/2017 0908    K 4.1 05/03/2017 0908    CL 98 05/03/2017 0908    CO2 30 (H) 05/03/2017 0908    BUN 40 (H) 05/03/2017 0908    CREATININE 8.4 (H) 05/03/2017 0908     05/03/2017 0908        Component Value Date/Time    CALCIUM 9.2 05/03/2017 0908    ALKPHOS 58 05/03/2017 0908    AST 48 (H) 05/03/2017 0908     ALT 75 (H) 05/03/2017 0908    BILITOT 0.7 05/03/2017 0908            Assessment:       1. Metastatic renal cell carcinoma, left    2. Malignant neoplasm metastatic to peritoneum    3. History of right oophorectomy    4. S/p nephrectomy left    5. Encounter for chemotherapy management    6. Anemia in ESRD (end-stage renal disease)    7. ESRD on hemodialysis    8. Hypertensive kidney disease with chronic kidney disease, stage 1-4 or unspecified chronic kidney disease    9. Thyroid nodule    10. Abnormal echocardiogram    11. Elevated LFTs        Plan:     1,2,3,4,5- Metastatic Renal Cell Carcinoma:    - It is unlikely that surgery removed all sites of metastatic disease. Given that this is c/w her previous papillary disease, pt started on oral cabozantinib 60mg daily (for dual VEGF and MET inhibition), especially given recent data that shows cabo is far superior to student in the first line setting.  If pt progresses on this regimen, will switch to nivolumab.     Restaging scans show stable disease and pt tolerating cabozantinib relatively well. Continue cabozantinib for now. Cytology from thoracentesis did not show malignant cells. RTC in 4 weeks with labs (CBC,CMP) and to see me.     6,- Improved after procrit.    7,8,-Elevated today. Will monitor. Pt reports compliance with meds.     9- Had thyroid US and biopsy last year, which was negative. Will monitor for now.    10- Refer to cardiology.    11- Mild. Will monitor.    Patient is in agreement with the proposed treatment plan. All questions were answered to the patient's satisfaction. Pt knows to call clinic if anything is needed before the next clinic visit.    NAS Telles  Hematology and Medical Oncology

## 2017-05-10 ENCOUNTER — OFFICE VISIT (OUTPATIENT)
Dept: CARDIOLOGY | Facility: CLINIC | Age: 52
End: 2017-05-10
Payer: COMMERCIAL

## 2017-05-10 ENCOUNTER — TELEPHONE (OUTPATIENT)
Dept: TRANSPLANT | Facility: CLINIC | Age: 52
End: 2017-05-10

## 2017-05-10 VITALS
DIASTOLIC BLOOD PRESSURE: 86 MMHG | HEART RATE: 88 BPM | SYSTOLIC BLOOD PRESSURE: 166 MMHG | BODY MASS INDEX: 23.37 KG/M2 | WEIGHT: 136.88 LBS | HEIGHT: 64 IN

## 2017-05-10 DIAGNOSIS — I42.9 CARDIOMYOPATHY, UNSPECIFIED TYPE: Primary | ICD-10-CM

## 2017-05-10 PROCEDURE — 1160F RVW MEDS BY RX/DR IN RCRD: CPT | Mod: NTX,S$GLB,, | Performed by: INTERNAL MEDICINE

## 2017-05-10 PROCEDURE — 99999 PR PBB SHADOW E&M-EST. PATIENT-LVL III: CPT | Mod: PBBFAC,TXP,,

## 2017-05-10 PROCEDURE — 99214 OFFICE O/P EST MOD 30 MIN: CPT | Mod: NTX,S$GLB,, | Performed by: INTERNAL MEDICINE

## 2017-05-10 RX ORDER — CARVEDILOL 12.5 MG/1
12.5 TABLET ORAL 2 TIMES DAILY WITH MEALS
Qty: 60 TABLET | Refills: 11 | Status: SHIPPED | OUTPATIENT
Start: 2017-05-10 | End: 2017-05-17

## 2017-05-10 RX ORDER — ISOSORBIDE DINITRATE AND HYDRALAZINE HYDROCHLORIDE 37.5; 2 MG/1; MG/1
1 TABLET ORAL 3 TIMES DAILY
Qty: 90 TABLET | Refills: 11 | Status: SHIPPED | OUTPATIENT
Start: 2017-05-10 | End: 2017-05-18

## 2017-05-10 RX ORDER — FUROSEMIDE 40 MG/1
40 TABLET ORAL DAILY PRN
Qty: 30 TABLET | Refills: 11 | Status: SHIPPED | OUTPATIENT
Start: 2017-05-10 | End: 2018-09-28

## 2017-05-10 NOTE — PROGRESS NOTES
Cardiology Clinic Note  Reason for Visit: depressed EF    HPI:   Ms. Eva Bloom is a 51 y.o. woman with history of recurrent metastatic renal cell carcinoma s/p L nephrectomy and R oophorectomy, ESRD on HD qTTS, and HTN who was referred to the cardiology clinic for evaluation of a newly depressed EF. She was recently hospitalized in 4/2017 with SOB and bilateral pleural effusions, likely 2/2 ADHF. She received thoracentesis and diuresis and has since improved. Since her hospitalization, she notes that she has had less SOB, but still gets SOB with ADLs such as washing dishes or showering. Her symptoms are c/w NYHA III. She has stable ~ 1 block exertional dyspnea. Denies any PND, syncope, palpitations, dizziness/lightheadedness, or LE edema. She first noted symptoms c/w CHF a few weeks prior to her hospitalization.    Of note, she began cabozantinib for chemotherapy late last fall.     ROS:    Constitution: negative for - fever, chills, weight loss or weight gain  HENT: negative for - sore throat, rhinorrhea, or headache  Eyes: negative for - blurred or double vision  Cardiovascular: no chest pain or dyspnea on exertion  Pulmonary: no cough, shortness of breath, or wheezing  Gastrointestinal: negative for - abdominal pain, nausea, vomiting, or diarrhea  : negative for - dysuria  Neurological: negative for - focal weakness or sensory changes  PMH:     Past Medical History:   Diagnosis Date    Anemia     Bronchitis 03/01/2017    Cancer 2016    kidney cancer    CMV (cytomegalovirus) antibody positive     Essential hypertension 9/23/2015    H/O herpes simplex type 2 infection     Herpes simplex type 1 antibody positive     History of kidney cancer     s/p left nephrectomy 1/2016    Hyperparathyroidism, unspecified     Hyperuricemia without signs of inflammatory arthritis and tophaceous disease     Kidney stones     LGSIL (low grade squamous intraepithelial dysplasia)     Prediabetes      Proteinuria     Renal disorder     Thyroid nodule     Urate nephropathy      Past Surgical History:   Procedure Laterality Date    BREAST SURGERY      LUMPECTOMY    COLONOSCOPY N/A 11/12/2015    NEPHRECTOMY-LAPAROSCOPIC Left 01/12/2016    PERITONEAL CATHETER INSERTION      Permacath insertion  01/12/2017    SALPINGOOPHORECTOMY Right 2016    KJB---DAVINCI    TONSILLECTOMY      TUBAL LIGATION       Allergies:     Review of patient's allergies indicates:   Allergen Reactions    Allopurinol      Other reaction(s): abnormal transaminases     Medications:     Current Outpatient Prescriptions on File Prior to Visit   Medication Sig Dispense Refill    acetaminophen (TYLENOL) 500 MG tablet Take 500 mg by mouth every 6 (six) hours as needed for Pain.      aluminum-magnesium hydroxide-simethicone (MAALOX) 200-200-20 mg/5 mL Susp Take 30 mLs by mouth every 6 (six) hours as needed.       cabozantinib 60 mg Tab Take 60 mg by mouth once daily. 30 tablet 5    febuxostat (ULORIC) 40 mg Tab Take 1 tablet (40 mg total) by mouth once daily. 30 tablet 4    losartan (COZAAR) 100 MG tablet Take 1 tablet (100 mg total) by mouth once daily. 30 tablet 5    multivitamin-Ca-iron-minerals (ONE-A-DAY WOMENS FORMULA) 27-0.4 mg Tab Take by mouth. 1 Tablet Oral Every day      ondansetron (ZOFRAN-ODT) 8 MG TbDL Take 1 tablet (8 mg total) by mouth every 12 (twelve) hours as needed. 30 tablet 1    psyllium husk-calcium (METAMUCIL PLUS CALCIUM) 1-60 gram-mg Cap Take by mouth. 2 Capsule Oral Every morning      sevelamer HCl (RENAGEL) 800 MG Tab Take 2 tablets (1,600 mg total) by mouth 3 (three) times daily with meals. 180 tablet 11    [DISCONTINUED] nebivolol (BYSTOLIC) 5 MG Tab Take 1 tablet (5 mg total) by mouth once daily. 30 tablet 11    [DISCONTINUED] nifedipine (PROCARDIA-XL) 90 MG (OSM) TR24 Take 1 tablet (90 mg total) by mouth once daily. 30 tablet 5     No current facility-administered medications on file prior to visit.   "    Social History:     Social History   Substance Use Topics    Smoking status: Never Smoker    Smokeless tobacco: Never Used    Alcohol use No      Comment: . 2 children. works at [employer].     Family History:     Family History   Problem Relation Age of Onset    Hypertension Mother     Diabetes Father     Kidney disease Father     Diabetes Maternal Grandfather     No Known Problems Sister     No Known Problems Brother     Heart disease Sister     No Known Problems Sister     No Known Problems Brother     Breast cancer Neg Hx     Colon cancer Neg Hx     Cancer Neg Hx     Stroke Neg Hx      Physical Exam:   BP (!) 166/86 (BP Location: Right arm, Patient Position: Sitting, BP Method: Automatic)  Pulse 88  Ht 5' 4" (1.626 m)  Wt 62.1 kg (136 lb 14.5 oz)  LMP 10/24/2016  BMI 23.5 kg/m2     Constitutional: NAD, conversant  HEENT: Sclera anicteric, PERRLA, EOMI  Neck: No JVD, no carotid bruits  CV: RRR, 2/6 WILLIAN at base, normal S1/S2  Pulm: CTAB, no wheezes, rales, or ronchi  GI: Abdomen soft, NTND, +BS  Extremities: No LE edema, warm and well perfused  Skin: No ecchymosis, erythema, or ulcers  Psych: AOx3, appropriate affect  Neuro: CNII-XII intact, no focal deficits    Labs:     Lab Results   Component Value Date     05/03/2017    K 4.1 05/03/2017    CL 98 05/03/2017    CO2 30 (H) 05/03/2017    BUN 40 (H) 05/03/2017    CREATININE 8.4 (H) 05/03/2017    ANIONGAP 14 05/03/2017     Lab Results   Component Value Date    HGBA1C 5.1 09/08/2016     Lab Results   Component Value Date    BNP >4900 (H) 04/05/2017    BNP <10 06/04/2016    Lab Results   Component Value Date    WBC 4.61 05/03/2017    HGB 13.0 05/03/2017    HCT 41.7 05/03/2017     05/03/2017    GRAN 1.4 (L) 05/03/2017     Lab Results   Component Value Date    CHOL 151 09/08/2016    HDL 43 09/08/2016    LDLCALC 90.4 09/08/2016    TRIG 88 09/08/2016          Imaging:   TTE: 5/3/17  CONCLUSIONS     1 - Eccentric LVH with " Quantitatively measured LV function is 37%.     2 - Severe left atrial enlargement.     3 - Increased LAP.     4 - Moderate to severe mitral regurgitation.     5 - Normal right ventricular systolic function .     6 - Moderate tricuspid regurgitation.     7 - Pulmonary hypertension. The estimated PA systolic pressure is 54 mmHg.     8 - Small pericardial effusion.   EF   Date Value Ref Range Status   05/03/2017 35 (A) 55 - 65    10/07/2016 55 55 - 65    10/05/2015 55 55 - 65        EKG: NSR, 93 bpm, NSST, prolonged QT  Assessment:   Ms. Eva Bloom is a 51 y.o. woman with history of recurrent metastatic renal cell carcinoma s/p L nephrectomy and R oophorectomy, ESRD on HD qTTS, and HTN who was referred to the cardiology clinic for evaluation of a newly depressed EF.    Plan:   Cardiomyopathy, unspecified type  - possibly related to chemotherapy; will r/o ischemic cardiomyopathy with PET Stress  - given depressed EF, will discontinue nifedipine and nebivolol  - start coreg 12.5 mg BID and bidil 20/37.5 mg TID  - continue losartan 100 mg daily  - avoid aldactone given ESRD  - she still makes some urine; lasix 40 mg prn for fluid retention and weight gain of 3 lbs within 24 hrs or 5 lbs within 1 week; advised that should she gain weight she should also speak with her nephrologist to discuss removing more fluid during HD sessions.    Discussed with Dr. Finley. RTC 2 months with Dr JUAN DANIEL Louis..     Signed:  Easton Cole MD  Cardiology Fellow, PGY-6  Pager: [phone]  5/10/2017 1:02 PM

## 2017-05-10 NOTE — MR AVS SNAPSHOT
[facility]  [address]  [facility] [address]  Phone: [phone]                  Eva Bloom   5/10/2017 8:40 AM   Office Visit    Description:  Female : 1965   Provider:  Easton Cole MD   Department:  [facility]           Reason for Visit     Metastatic renal cell carcinoma, left     Malignant neoplasm metastatic to peritoneum           Diagnoses this Visit        Comments    Cardiomyopathy, unspecified type    -  Primary            To Do List           Future Appointments        Provider Department Dept Phone    2017 8:00 AM Sowmya Mccain MD [facility] Family Medicine [phone]    2017 9:00 AM LAB, HEMONC CANCER BLDG [facility] [phone]    2017 10:00 AM [facility] - Hematology Oncology [phone]    6/15/2017 9:40 AM Alison Rivera MD [facility] - Urology [phone]    2017 8:30 AM VASCULAR, LAB [facility] [phone]      Goals (5 Years of Data)              9/8/16    10/9/15    HEMOGLOBIN A1C < 7.0   5.1  6.0    Related Problems    Prediabetes      Follow-Up and Disposition     Return in about 2 months (around 7/10/2017), or if symptoms worsen or fail to improve, for with Dr JUAN DANIEL Louis; 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       These Medications        Disp Refills Start End    carvedilol (COREG) 12.5 MG tablet 60 tablet 11 5/10/2017 5/10/2018    Take 1 tablet (12.5 mg total) by mouth 2 (two) times daily with meals. - Oral    Pharmacy: [facility] [place] ([place], [place] - [address] Ph #: [phone]       isosorbide-hydrALAZINE 20-37.5 mg (BIDIL) 20-37.5 mg Tab 90 tablet 11 5/10/2017 5/10/2018    Take 1 tablet by mouth 3 (three) times daily. - Oral    Pharmacy: [facility] - [place] ([place], [place] - [address] Ph #: [phone]       furosemide (LASIX) 40 MG tablet 30 tablet 11 5/10/2017 5/10/2018    Take 1  tablet (40 mg total) by mouth daily as needed (weight gain of 3 lbs in 24 hrs or 5 lbs in 1 week). - Oral    Pharmacy: [facility] [address] [place], [place] [address], [place] [address] [address] [facility] #: [phone]         [facility] On Call     [facility] On Call Nurse Care Line - 24/7 Assistance  Unless otherwise directed by your provider, please contact [facility], our nurse care line that is available for 24/7 assistance.     Registered nurses in the [facility] On Call Center provide: appointment scheduling, clinical advisement, health education, and other advisory services.  Call: [phone] (toll free)               Medications           Message regarding Medications     Verify the changes and/or additions to your medication regime listed below are the same as discussed with your clinician today.  If any of these changes or additions are incorrect, please notify your healthcare provider.        START taking these NEW medications        Refills    carvedilol (COREG) 12.5 MG tablet 11    Sig: Take 1 tablet (12.5 mg total) by mouth 2 (two) times daily with meals.    Class: Normal    Route: Oral    isosorbide-hydrALAZINE 20-37.5 mg (BIDIL) 20-37.5 mg Tab 11    Sig: Take 1 tablet by mouth 3 (three) times daily.    Class: Normal    Route: Oral    furosemide (LASIX) 40 MG tablet 11    Sig: Take 1 tablet (40 mg total) by mouth daily as needed (weight gain of 3 lbs in 24 hrs or 5 lbs in 1 week).    Class: Normal    Route: Oral      STOP taking these medications     nebivolol (BYSTOLIC) 5 MG Tab Take 1 tablet (5 mg total) by mouth once daily.    nifedipine (PROCARDIA-XL) 90 MG (OSM) TR24 Take 1 tablet (90 mg total) by mouth once daily.           Verify that the below list of medications is an accurate representation of the medications you are currently taking.  If none reported, the list may be blank. If incorrect, please contact your healthcare provider. Carry this list with you in case of emergency.           Current  "Medications     acetaminophen (TYLENOL) 500 MG tablet Take 500 mg by mouth every 6 (six) hours as needed for Pain.    aluminum-magnesium hydroxide-simethicone (MAALOX) 200-200-20 mg/5 mL Susp Take 30 mLs by mouth every 6 (six) hours as needed.     cabozantinib 60 mg Tab Take 60 mg by mouth once daily.    febuxostat (ULORIC) 40 mg Tab Take 1 tablet (40 mg total) by mouth once daily.    losartan (COZAAR) 100 MG tablet Take 1 tablet (100 mg total) by mouth once daily.    multivitamin-Ca-iron-minerals (ONE-A-DAY WOMENS FORMULA) 27-0.4 mg Tab Take by mouth. 1 Tablet Oral Every day    ondansetron (ZOFRAN-ODT) 8 MG TbDL Take 1 tablet (8 mg total) by mouth every 12 (twelve) hours as needed.    psyllium husk-calcium (METAMUCIL PLUS CALCIUM) 1-60 gram-mg Cap Take by mouth. 2 Capsule Oral Every morning    sevelamer HCl (RENAGEL) 800 MG Tab Take 2 tablets (1,600 mg total) by mouth 3 (three) times daily with meals.    carvedilol (COREG) 12.5 MG tablet Take 1 tablet (12.5 mg total) by mouth 2 (two) times daily with meals.    furosemide (LASIX) 40 MG tablet Take 1 tablet (40 mg total) by mouth daily as needed (weight gain of 3 lbs in 24 hrs or 5 lbs in 1 week).    isosorbide-hydrALAZINE 20-37.5 mg (BIDIL) 20-37.5 mg Tab Take 1 tablet by mouth 3 (three) times daily.           Clinical Reference Information           Your Vitals Were     BP Pulse Height Weight Last Period BMI    166/86 (BP Location: Right arm, Patient Position: Sitting, BP Method: Automatic) 88 5' 4" (1.626 m) 62.1 kg (136 lb 14.5 oz) 10/24/2016 23.5 kg/m2      Blood Pressure          Most Recent Value    Right Arm BP - Sitting  166/86    Reason for not completing BP on both arms  AV shunt    BP  (!)  166/86      Allergies as of 5/10/2017     Allopurinol      Immunizations Administered on Date of Encounter - 5/10/2017     None      Orders Placed During Today's Visit     Future Labs/Procedures Expected by Expires    Cardiac PET Scan Stress  As directed 5/10/2018    "   Maintenance Dialysis History     Start End Type Comments Center    1/15/2016  Peritoneal  [facility] [place]            Current Dialysis Center Information     [facility] [place] [address] Phone #:  [phone]    Contact:  N/A LOUISA DUARTE  [address] Fax #:  [phone]            Transplant Information        Txp Date Organ Coordinator Care Team     Kidney Kinsey Carballo, RN Referring Physician:  Williams Hart MD   Current Nephrologist:  Williams Hart MD         Instructions    1. Stop nifedipine (procardia-XL)  2. Stop nebivolol (bystolic)  3. Start carvedilol (coreg) 12.5 mg twice daily  4. Start isosorbide/hydralazine (bidil) 20/37.5 mg three times daily  5. Weigh yourself daily; should you gain 3 lbs in 24 hrs or 5 lbs in 1 week start lasix 40 mg daily until weight decrease to dry weight and discuss with nephrologist about taking more fluid off during dialysis  6. Take your blood pressure and heart rate twice daily for 1 week and call the clinic with the results  7. Obtain PET stress test       Language Assistance Services     ATTENTION: Language assistance services are available, free of charge. Please call [phone].      ATENCIÓN: Si habla mayra, tiene a bonilla disposición servicios gratuitos de asistencia lingüística. Llame al [phone].     CHÚ Ý: N?u b?n nói Ti?ng Vi?t, có các d?ch v? h? tr? ngôn ng? mi?n phí dành cho b?n. G?i s? [phone].         [facility] complies with applicable Federal civil rights laws and does not discriminate on the basis of race, color, national origin, age, disability, or sex.

## 2017-05-10 NOTE — PROGRESS NOTES
I have personally taken the history and examined this patient and agree with the Fellow's note as stated above.    Agree with improving medical management as current goal.

## 2017-05-10 NOTE — TELEPHONE ENCOUNTER
Kinsey,    The above referenced patient has moved to [facility], [address], [place].  Please update in EPIC.    Thanks,    Nila Fink   Histocompatibility Lab  [address]  P: [phone]  F: [phone]

## 2017-05-12 ENCOUNTER — TELEPHONE (OUTPATIENT)
Dept: CARDIOLOGY | Facility: CLINIC | Age: 52
End: 2017-05-12

## 2017-05-12 NOTE — TELEPHONE ENCOUNTER
Spoke with the pt - says that she has vomited twice today, is feeling very bad, and has no appetite. Says has not been able to take isosorbide-hydralazine due to cost, and Coreg is making her sick.  Advised the pt to go to be evaluated at the ED and she verbalized understnding.

## 2017-05-12 NOTE — TELEPHONE ENCOUNTER
"----- Message from Abby Tse sent at 5/12/2017  2:00 PM CDT -----  Contact: Pt called  Pt called, states RX isosorbide-hydralize is too expensive.RX coreg is causing her to vomit, feel "very bad" and have no appetite. She states she had slight pain. Pt of Dr. Cole and LOV 5/10/17. Ph for pt is [phone]. Thank you  "

## 2017-05-17 ENCOUNTER — OFFICE VISIT (OUTPATIENT)
Dept: FAMILY MEDICINE | Facility: CLINIC | Age: 52
End: 2017-05-17
Payer: COMMERCIAL

## 2017-05-17 VITALS
BODY MASS INDEX: 23.06 KG/M2 | WEIGHT: 135.06 LBS | TEMPERATURE: 98 F | DIASTOLIC BLOOD PRESSURE: 92 MMHG | HEART RATE: 87 BPM | HEIGHT: 64 IN | OXYGEN SATURATION: 96 % | SYSTOLIC BLOOD PRESSURE: 146 MMHG

## 2017-05-17 DIAGNOSIS — I10 ESSENTIAL HYPERTENSION: Primary | ICD-10-CM

## 2017-05-17 DIAGNOSIS — Z99.2 ESRD ON HEMODIALYSIS: ICD-10-CM

## 2017-05-17 DIAGNOSIS — N25.81 HYPERPARATHYROIDISM, SECONDARY RENAL: ICD-10-CM

## 2017-05-17 DIAGNOSIS — R73.03 PREDIABETES: ICD-10-CM

## 2017-05-17 DIAGNOSIS — C64.9 METASTATIC RENAL CELL CARCINOMA TO INTRA-ABDOMINAL SITE: ICD-10-CM

## 2017-05-17 DIAGNOSIS — N18.6 ESRD ON HEMODIALYSIS: ICD-10-CM

## 2017-05-17 DIAGNOSIS — C79.89 METASTATIC RENAL CELL CARCINOMA TO INTRA-ABDOMINAL SITE: ICD-10-CM

## 2017-05-17 PROBLEM — J96.01 ACUTE HYPOXEMIC RESPIRATORY FAILURE: Status: RESOLVED | Noted: 2017-04-06 | Resolved: 2017-05-17

## 2017-05-17 PROBLEM — T85.858A STENOSIS DUE TO ANY DEVICE, IMPLANT OR GRAFT: Status: RESOLVED | Noted: 2017-03-06 | Resolved: 2017-05-17

## 2017-05-17 PROBLEM — T82.858A STENOSIS OF ARTERIOVENOUS DIALYSIS FISTULA: Status: RESOLVED | Noted: 2017-02-17 | Resolved: 2017-05-17

## 2017-05-17 PROBLEM — J90 PLEURAL EFFUSION, BILATERAL: Status: RESOLVED | Noted: 2017-04-05 | Resolved: 2017-05-17

## 2017-05-17 PROCEDURE — 99215 OFFICE O/P EST HI 40 MIN: CPT | Mod: S$GLB,,, | Performed by: INTERNAL MEDICINE

## 2017-05-17 PROCEDURE — 99999 PR PBB SHADOW E&M-EST. PATIENT-LVL III: CPT | Mod: PBBFAC,,, | Performed by: INTERNAL MEDICINE

## 2017-05-17 PROCEDURE — 1160F RVW MEDS BY RX/DR IN RCRD: CPT | Mod: S$GLB,,, | Performed by: INTERNAL MEDICINE

## 2017-05-17 NOTE — PROGRESS NOTES
HISTORY OF PRESENT ILLNESS:  Eva Bloom is a 51 y.o. female who presents to the clinic today for Hypertension and Follow-up  .  The patient presents to clinic today for follow-up of her hypertension, end-stage renal disease on hemodialysis, and prediabetes.  Since her last office visit with me she was diagnosed with metastatic renal cell carcinoma.  She was hospitalized for some shortness of breath at which time she was noted to have pleural effusion.  She is status post thoracentesis which luckily was negative for cancerous cells.  She has since been diagnosed with myopathy and has symptoms of NYHA III heart failure.  She is now seeing cardiology for further evaluation.  The patient does not check her blood pressures at home.  She recently had some medication changes made by cardiology due to her new cardiac findings.  The patient states that the Coreg gave her nausea.  She has discontinued taking it.  She is taking the Bidil.  She is unsure if she is currently taking losartan or not.  She is also not taking her Renagel because she states it is too expensive.  She is currently on hemodialysis for her kidney failure.  She denies cardiac chest pain at this time.  She continues with shortness of breath with activities of daily living.  She denies temperature intolerance or unexplained changes in her weight.      PAST MEDICAL HISTORY:  Past Medical History:   Diagnosis Date    Anemia     Bronchitis 03/01/2017    Cancer 2016    kidney cancer    CMV (cytomegalovirus) antibody positive     Essential hypertension 9/23/2015    H/O herpes simplex type 2 infection     Herpes simplex type 1 antibody positive     History of kidney cancer     s/p left nephrectomy 1/2016    Hyperparathyroidism, unspecified     Hyperuricemia without signs of inflammatory arthritis and tophaceous disease     Kidney stones     LGSIL (low grade squamous intraepithelial dysplasia)     Prediabetes     Proteinuria     Renal disorder      Thyroid nodule     Urate nephropathy        PAST SURGICAL HISTORY:  Past Surgical History:   Procedure Laterality Date    BREAST SURGERY      LUMPECTOMY    COLONOSCOPY N/A 11/12/2015    NEPHRECTOMY-LAPAROSCOPIC Left 01/12/2016    PERITONEAL CATHETER INSERTION      Permacath insertion  01/12/2017    SALPINGOOPHORECTOMY Right 2016    KJB---DAVINCI    TONSILLECTOMY      TUBAL LIGATION         SOCIAL HISTORY:  Social History     Social History    Marital status:      Spouse name: N/A    Number of children: 2    Years of education: N/A     Occupational History    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 2 children. works at [employer].    Drug use: No    Sexual activity: Yes     Partners: Male     Other Topics Concern    Not on file     Social History Narrative    No narrative on file       FAMILY HISTORY:  Family History   Problem Relation Age of Onset    Hypertension Mother     Diabetes Father     Kidney disease Father     Diabetes Maternal Grandfather     No Known Problems Sister     No Known Problems Brother     Heart disease Sister     No Known Problems Sister     No Known Problems Brother     Breast cancer Neg Hx     Colon cancer Neg Hx     Cancer Neg Hx     Stroke Neg Hx        ALLERGIES AND MEDICATIONS: updated and reviewed.  Review of patient's allergies indicates:   Allergen Reactions    Coreg [carvedilol] Other (See Comments)     Nausea/vomiting    Allopurinol      Other reaction(s): abnormal transaminases     Medication List with Changes/Refills   Current Medications    ACETAMINOPHEN (TYLENOL) 500 MG TABLET    Take 500 mg by mouth every 6 (six) hours as needed for Pain.    ALUMINUM-MAGNESIUM HYDROXIDE-SIMETHICONE (MAALOX) 200-200-20 MG/5 ML SUSP    Take 30 mLs by mouth every 6 (six) hours as needed.     CABOZANTINIB 60 MG TAB    Take 60 mg by  mouth once daily.    FEBUXOSTAT (ULORIC) 40 MG TAB    Take 1 tablet (40 mg total) by mouth once daily.    FUROSEMIDE (LASIX) 40 MG TABLET    Take 1 tablet (40 mg total) by mouth daily as needed (weight gain of 3 lbs in 24 hrs or 5 lbs in 1 week).    ISOSORBIDE-HYDRALAZINE 20-37.5 MG (BIDIL) 20-37.5 MG TAB    Take 1 tablet by mouth 3 (three) times daily.    LOSARTAN (COZAAR) 100 MG TABLET    Take 1 tablet (100 mg total) by mouth once daily.    MULTIVITAMIN-CA-IRON-MINERALS (ONE-A-DAY WOMENS FORMULA) 27-0.4 MG TAB    Take by mouth. 1 Tablet Oral Every day    ONDANSETRON (ZOFRAN-ODT) 8 MG TBDL    Take 1 tablet (8 mg total) by mouth every 12 (twelve) hours as needed.    PSYLLIUM HUSK-CALCIUM (METAMUCIL PLUS CALCIUM) 1-60 GRAM-MG CAP    Take by mouth. 2 Capsule Oral Every morning    SEVELAMER HCL (RENAGEL) 800 MG TAB    Take 2 tablets (1,600 mg total) by mouth 3 (three) times daily with meals.   Discontinued Medications    CARVEDILOL (COREG) 12.5 MG TABLET    Take 1 tablet (12.5 mg total) by mouth 2 (two) times daily with meals.    PROMETHAZINE-CODEINE 6.25-10 MG/5 ML (PHENERGAN WITH CODEINE) 6.25-10 MG/5 ML SYRUP    Take 2.5 mLs by mouth every 4 (four) hours as needed for Cough.            REVIEW OF SYSTEMS:  General ROS: negative for - chills, fever or malaise  Psychological ROS: negative for - memory difficulties, obsessive thoughts or suicidal ideation  Ophthalmic ROS: negative for - blurry vision or eye pain  ENT ROS: negative for - epistaxis, oral lesions or sore throat  Allergy and Immunology ROS: negative for - hives  Hematological and Lymphatic ROS: negative for - swollen lymph nodes  Endocrine ROS: negative for - polydipsia/polyuria or temperature intolerance  Respiratory ROS: negative for - hemoptysis or wheezing  Cardiovascular ROS: negative for - chest pain or palpitations  Gastrointestinal ROS: no abdominal pain, change in bowel habits, or black or bloody stools  Dermatological ROS: negative for mole  "changes and rash  Musculoskeletal ROS: negative for - gait disturbance, joint swelling or muscle pain  Neurological ROS: no TIA or stroke symptoms  Genito-Urinary ROS: no dysuria, trouble voiding, or hematuria      PHYSICAL EXAM:   Vitals:    05/17/17 0746   BP: (!) 146/92   Pulse: 87   Temp: 97.7 °F (36.5 °C)     Weight: 61.3 kg (135 lb 0.5 oz)   Height: 5' 4" (162.6 cm)   Body mass index is 23.18 kg/(m^2).     General appearance - alert, well appearing, and in no distress and normal appearing weight  Mental status - alert, oriented to person, place, and time, normal mood, behavior, speech, dress, motor activity, and thought processes  Eyes - pupils equal and reactive, extraocular eye movements intact, sclera anicteric  Mouth - mucous membranes moist, pharynx normal without lesions  Neck - supple, no significant adenopathy, carotids upstroke normal bilaterally, no bruits, thyroid exam: thyroid is normal in size without nodules or tenderness  Lymphatics - no palpable lymphadenopathy  Chest - clear to auscultation, no wheezes, rales or rhonchi, symmetric air entry  Heart - normal rate and regular rhythm, no gallops noted, holosystolic murmur noted  Neurological - alert, oriented, normal speech, no focal findings or movement disorder noted, cranial nerves II through XII intact  Musculoskeletal - no joint tenderness, deformity or swelling, no muscular tenderness noted  Extremities - no pedal edema noted  Skin - normal coloration and turgor, no rashes, no suspicious skin lesions noted      ASSESSMENT AND PLAN:  1. Essential hypertension  Blood pressures are not at goal.  We discussed low-salt diet and regular exercise.  She will go home and make sure that she is taking the losartan.  I will confer with nephrology and cardiology regarding blood pressure medication adjustments.    2. ESRD on hemodialysis  The current medical regimen is effective;  continue present plan and medications.  Followed by nephrology.    3. " Prediabetes  Stable. We discussed low sugar/low carbohydrate diet and regular exercise to prevent progression. No need for prescription medication at this time.     4. Metastatic renal cell carcinoma to intra-abdominal site  Stable.  Currently on chemotherapy.  Followed by oncology.    5. Hyperparathyroidism, secondary renal  Stable. Asymptomatic. Observe.           Return in about 3 months (around 8/17/2017). or sooner as needed.

## 2017-05-17 NOTE — Clinical Note
"I saw our mutual patient in the clinic today.  Blood pressures are elevated.  She states she could not take the Coreg as it is making her "sick".  She is taking the Bidil.  She could not tell me for sure if she is taking the losartan.  She was supposed to go home and check on this and send me a message to let me know.  I will need some guidance on medication adjustments to get her blood pressures controlled.  I appreciate your help!"

## 2017-05-17 NOTE — MR AVS SNAPSHOT
[facility]  [address]  [place] [address]  Phone: [phone]  Fax: [phone]                  Eva Bloom   2017 8:00 AM   Office Visit    Description:  Female : 1965   Provider:  Sowmya Mccain MD   Department:  [facility]           Reason for Visit     Hypertension     Follow-up           Diagnoses this Visit        Comments    Essential hypertension    -  Primary     ESRD on hemodialysis         Prediabetes         Metastatic renal cell carcinoma to intra-abdominal site         Hyperparathyroidism, secondary renal                To Do List           Future Appointments        Provider Department Dept Phone    2017 9:00 AM LAB, HEMONC CANCER BLDG [facility] [phone]    2017 10:00 AM [facility] [place] - Hematology Oncology [phone]    2017 8:30 AM CARDIAC, PET IMAGING [facility] - Cardiac [phone]    2017 8:00 AM Leora Louis MD [facility] - Cardiology [phone]    2017 8:30 AM VASCULAR, LAB [facility] - Vascular Laboratory [phone]      Goals (5 Years of Data)              9/8/16    10/9/15    HEMOGLOBIN A1C < 7.0   5.1  6.0    Related Problems    Prediabetes      Follow-Up and Disposition     Return in about 3 months (around 2017).      [facility]     [facility] Care [facility] -  Assistance  Unless otherwise directed by your provider, please contact [facility], our nurse care line that is available for  assistance.     Registered nurses in the [facility] provide: appointment scheduling, clinical advisement, health education, and other advisory services.  Call: [phone] (toll free)               Medications           Message regarding Medications     Verify the changes and/or additions to your medication regime listed below are the same as discussed with your clinician today.  If any of these changes or additions are incorrect,  please notify your healthcare provider.        STOP taking these medications     carvedilol (COREG) 12.5 MG tablet Take 1 tablet (12.5 mg total) by mouth 2 (two) times daily with meals.    promethazine-codeine 6.25-10 mg/5 ml (PHENERGAN WITH CODEINE) 6.25-10 mg/5 mL syrup Take 2.5 mLs by mouth every 4 (four) hours as needed for Cough.           Verify that the below list of medications is an accurate representation of the medications you are currently taking.  If none reported, the list may be blank. If incorrect, please contact your healthcare provider. Carry this list with you in case of emergency.           Current Medications     acetaminophen (TYLENOL) 500 MG tablet Take 500 mg by mouth every 6 (six) hours as needed for Pain.    aluminum-magnesium hydroxide-simethicone (MAALOX) 200-200-20 mg/5 mL Susp Take 30 mLs by mouth every 6 (six) hours as needed.     cabozantinib 60 mg Tab Take 60 mg by mouth once daily.    febuxostat (ULORIC) 40 mg Tab Take 1 tablet (40 mg total) by mouth once daily.    furosemide (LASIX) 40 MG tablet Take 1 tablet (40 mg total) by mouth daily as needed (weight gain of 3 lbs in 24 hrs or 5 lbs in 1 week).    isosorbide-hydrALAZINE 20-37.5 mg (BIDIL) 20-37.5 mg Tab Take 1 tablet by mouth 3 (three) times daily.    multivitamin-Ca-iron-minerals (ONE-A-DAY WOMENS FORMULA) 27-0.4 mg Tab Take by mouth. 1 Tablet Oral Every day    ondansetron (ZOFRAN-ODT) 8 MG TbDL Take 1 tablet (8 mg total) by mouth every 12 (twelve) hours as needed.    psyllium husk-calcium (METAMUCIL PLUS CALCIUM) 1-60 gram-mg Cap Take by mouth. 2 Capsule Oral Every morning    sevelamer HCl (RENAGEL) 800 MG Tab Take 2 tablets (1,600 mg total) by mouth 3 (three) times daily with meals.    losartan (COZAAR) 100 MG tablet Take 1 tablet (100 mg total) by mouth once daily.           Clinical Reference Information           Your Vitals Were     BP Pulse Temp Height Weight Last Period    146/92 (BP Location: Right arm, Patient  "Position: Sitting, BP Method: Manual) 87 97.7 °F (36.5 °C) (Oral) 5' 4" (1.626 m) 61.3 kg (135 lb 0.5 oz) 10/24/2016    SpO2 BMI             96% 23.18 kg/m2         Blood Pressure          Most Recent Value    BP  (!)  146/92      Allergies as of 5/17/2017     Coreg [Carvedilol]    Allopurinol      Immunizations Administered on Date of Encounter - 5/17/2017     None      Maintenance Dialysis History     Start End Type Comments Center    1/10/2017  Hemo  [facility] - [facility] [place]    1/15/2016 1/9/2017 Peritoneal  [facility] - [facility]            Current Dialysis Center Information     [facility] [address] Phone #:  [phone]    Contact:  N/A [place]  [phone] Fax #:  [phone]            Transplant Information        Txp Date Organ Coordinator Care Team     Kidney Kinsey Carballo RN Referring Physician:  Williams Hart MD   Current Nephrologist:  Williams Hart MD         Language Assistance Services     ATTENTION: Language assistance services are available, free of charge. Please call [phone].      ATENCIÓN: Si habla español, tiene a bonilla disposición servicios gratuitos de asistencia lingüística. Llame al [phone].     CHÚ Ý: N?u b?n nói Ti?ng Vi?t, có các d?ch v? h? tr? ngôn ng? mi?n phí dành cho b?n. G?i s? [phone].         [facility] complies with applicable Federal civil rights laws and does not discriminate on the basis of race, color, national origin, age, disability, or sex.        "

## 2017-05-18 RX ORDER — HYDRALAZINE HYDROCHLORIDE 25 MG/1
25 TABLET, FILM COATED ORAL EVERY 12 HOURS
Qty: 60 TABLET | Refills: 11 | Status: SHIPPED | OUTPATIENT
Start: 2017-05-18 | End: 2017-06-29 | Stop reason: SDUPTHER

## 2017-05-18 RX ORDER — ISOSORBIDE MONONITRATE 30 MG/1
30 TABLET, EXTENDED RELEASE ORAL DAILY
Qty: 30 TABLET | Refills: 11 | Status: SHIPPED | OUTPATIENT
Start: 2017-05-18 | End: 2017-12-18 | Stop reason: SDUPTHER

## 2017-05-25 ENCOUNTER — TELEPHONE (OUTPATIENT)
Dept: FAMILY MEDICINE | Facility: CLINIC | Age: 52
End: 2017-05-25

## 2017-05-25 ENCOUNTER — TELEPHONE (OUTPATIENT)
Dept: TRANSPLANT | Facility: CLINIC | Age: 52
End: 2017-05-25

## 2017-05-25 DIAGNOSIS — I10 ESSENTIAL HYPERTENSION: Primary | ICD-10-CM

## 2017-05-25 NOTE — TELEPHONE ENCOUNTER
Please call patient: I did not hear back from her - did nephrology make medication changes to better control her BP?

## 2017-05-26 NOTE — TELEPHONE ENCOUNTER
Spoke w/patient, states yes the Nephrology prescribed Isosorb 30mg daily and Hydralazine 25mg twice daily.

## 2017-05-26 NOTE — TELEPHONE ENCOUNTER
Noted. Are BPs improved?   I did speak with cardiology who feels that she needs to be on a beta blocker because of her decreased heart function and I do not see where this was added (that was the coreg she had problems with, but we can try a different medication at a low dose called metoprolol).

## 2017-05-30 RX ORDER — METOPROLOL SUCCINATE 25 MG/1
25 TABLET, EXTENDED RELEASE ORAL DAILY
Qty: 30 TABLET | Refills: 2 | Status: SHIPPED | OUTPATIENT
Start: 2017-05-30 | End: 2017-07-21 | Stop reason: DRUGHIGH

## 2017-05-30 NOTE — TELEPHONE ENCOUNTER
I called and spoke with pt and notified her that you sent a new medicine to her pharmacy to help her with her BP and made her an appointment on 06/14/17 with Jackie.Thanks

## 2017-05-30 NOTE — TELEPHONE ENCOUNTER
Recommend we add low dose metoprolol. I sent the Rx to the pharmacy.  Recommend office visit with Jackie Frances NP in 2 weeks for BP recheck.

## 2017-05-30 NOTE — TELEPHONE ENCOUNTER
I called and spoke with patient she states that her BP's are still about the same and she is not on any new medicine her heart as far as coreg and metoprolol.Thanks

## 2017-05-31 ENCOUNTER — OFFICE VISIT (OUTPATIENT)
Dept: HEMATOLOGY/ONCOLOGY | Facility: CLINIC | Age: 52
End: 2017-05-31
Payer: COMMERCIAL

## 2017-05-31 ENCOUNTER — LAB VISIT (OUTPATIENT)
Dept: LAB | Facility: HOSPITAL | Age: 52
End: 2017-05-31
Payer: COMMERCIAL

## 2017-05-31 VITALS
SYSTOLIC BLOOD PRESSURE: 184 MMHG | OXYGEN SATURATION: 94 % | HEIGHT: 64 IN | RESPIRATION RATE: 18 BRPM | BODY MASS INDEX: 22.85 KG/M2 | WEIGHT: 133.81 LBS | TEMPERATURE: 98 F | DIASTOLIC BLOOD PRESSURE: 104 MMHG | HEART RATE: 99 BPM

## 2017-05-31 DIAGNOSIS — N18.6 ESRD ON HEMODIALYSIS: ICD-10-CM

## 2017-05-31 DIAGNOSIS — I12.9 HYPERTENSIVE KIDNEY DISEASE WITH CHRONIC KIDNEY DISEASE, STAGE 1-4 OR UNSPECIFIED CHRONIC KIDNEY DISEASE: ICD-10-CM

## 2017-05-31 DIAGNOSIS — Z99.2 ESRD ON HEMODIALYSIS: ICD-10-CM

## 2017-05-31 DIAGNOSIS — R79.89 ELEVATED LFTS: ICD-10-CM

## 2017-05-31 DIAGNOSIS — Z51.11 ENCOUNTER FOR CHEMOTHERAPY MANAGEMENT: ICD-10-CM

## 2017-05-31 DIAGNOSIS — D63.1 ANEMIA IN ESRD (END-STAGE RENAL DISEASE): ICD-10-CM

## 2017-05-31 DIAGNOSIS — N18.6 ANEMIA IN ESRD (END-STAGE RENAL DISEASE): ICD-10-CM

## 2017-05-31 DIAGNOSIS — E04.1 THYROID NODULE: ICD-10-CM

## 2017-05-31 DIAGNOSIS — Z90.5 S/P NEPHRECTOMY: ICD-10-CM

## 2017-05-31 DIAGNOSIS — C78.6 MALIGNANT NEOPLASM METASTATIC TO PERITONEUM: ICD-10-CM

## 2017-05-31 DIAGNOSIS — R93.1 ABNORMAL ECHOCARDIOGRAM: ICD-10-CM

## 2017-05-31 DIAGNOSIS — C64.2 METASTATIC RENAL CELL CARCINOMA, LEFT: Primary | ICD-10-CM

## 2017-05-31 DIAGNOSIS — C64.2 METASTATIC RENAL CELL CARCINOMA, LEFT: ICD-10-CM

## 2017-05-31 DIAGNOSIS — Z90.721 HISTORY OF RIGHT OOPHORECTOMY: ICD-10-CM

## 2017-05-31 DIAGNOSIS — R06.02 SHORTNESS OF BREATH: ICD-10-CM

## 2017-05-31 LAB
ALBUMIN SERPL BCP-MCNC: 2.9 G/DL
ALP SERPL-CCNC: 60 U/L
ALT SERPL W/O P-5'-P-CCNC: 46 U/L
ANION GAP SERPL CALC-SCNC: 11 MMOL/L
AST SERPL-CCNC: 34 U/L
BILIRUB SERPL-MCNC: 1 MG/DL
BUN SERPL-MCNC: 44 MG/DL
CALCIUM SERPL-MCNC: 9.1 MG/DL
CHLORIDE SERPL-SCNC: 103 MMOL/L
CO2 SERPL-SCNC: 28 MMOL/L
CREAT SERPL-MCNC: 8.2 MG/DL
ERYTHROCYTE [DISTWIDTH] IN BLOOD BY AUTOMATED COUNT: 20.1 %
EST. GFR  (AFRICAN AMERICAN): 5.9 ML/MIN/1.73 M^2
EST. GFR  (NON AFRICAN AMERICAN): 5.1 ML/MIN/1.73 M^2
GLUCOSE SERPL-MCNC: 108 MG/DL
HCT VFR BLD AUTO: 38.3 %
HGB BLD-MCNC: 11.8 G/DL
MCH RBC QN AUTO: 28.4 PG
MCHC RBC AUTO-ENTMCNC: 30.8 %
MCV RBC AUTO: 92 FL
NEUTROPHILS # BLD AUTO: 1.7 K/UL
PLATELET # BLD AUTO: 167 K/UL
PMV BLD AUTO: 11 FL
POTASSIUM SERPL-SCNC: 4.1 MMOL/L
PROT SERPL-MCNC: 5.8 G/DL
RBC # BLD AUTO: 4.16 M/UL
SODIUM SERPL-SCNC: 142 MMOL/L
WBC # BLD AUTO: 4.69 K/UL

## 2017-05-31 PROCEDURE — 80053 COMPREHEN METABOLIC PANEL: CPT | Mod: TXP

## 2017-05-31 PROCEDURE — 99999 PR PBB SHADOW E&M-EST. PATIENT-LVL IV: CPT | Mod: PBBFAC,,, | Performed by: NURSE PRACTITIONER

## 2017-05-31 PROCEDURE — 99214 OFFICE O/P EST MOD 30 MIN: CPT | Mod: S$GLB,,, | Performed by: NURSE PRACTITIONER

## 2017-05-31 PROCEDURE — 36415 COLL VENOUS BLD VENIPUNCTURE: CPT | Mod: TXP

## 2017-05-31 PROCEDURE — 85027 COMPLETE CBC AUTOMATED: CPT | Mod: TXP

## 2017-05-31 NOTE — PROGRESS NOTES
"Subjective:       Patient ID: Eva Bloom is a 51 y.o. female.    Chief Complaint: Metastatic renal cell carcinoma, left    HPI   51 year old female, patient of Dr. Rivera, to clinic for 4 week follow up and to review labs for recurrent metastatic renal cell carcinoma. Pt is currently taking cabozantinib daily. Pt is on ESRD with HD every TTS.  She reports she feels well today but notes shortness of breath is slightly worse.    Today, she denies any mouth sores, nausea, vomiting, diarrhea, constipation,weight loss, chest pain, leg swelling, headache, dizziness, or mood changes.    ECOG 2. She is accompanied to clinic with her .    Oncologic History (From Chart and Patient):  Eva Bloom is a 51 y.o. female with ESRD on HD who was found to have two L renal masses. She underwent L lap nephrectomy on 1/12/16. 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 Margins were negative.  Shehealed from surgery well, but then developed a large ovarian mass.  This was removed by gyn along with multiple sites of metastatic disease in the pelvis.  Path was c/w recurrence of RCC.     11/20/16 Pelvic ultrasound reveals "Large heterogeneous cystic and solid mass which appears to arise from the right ovary with worrisome imaging features for malignancy.  Differential diagnosis includes malignant tumors such as serous or mucinous cystadenocarcinoma.  It is important to note that the patient is at risk for ovarian torsion due to the size of the mass.Multiple uterine fibroids are identified with the largest in the uterine fundus."    11/22/16 pathology reveals "FINAL PATHOLOGIC DIAGNOSIS-RIGHT OVARY AND FALLOPIAN TUBE, RIGHT SALPINGO-OOPHORECTOMY:  -Positive for malignancy, high grade carcinoma morphologically and immunohistochemically consistent with metastasis from the patient's known renal cell " "carcinoma"    1/23/17 MRI abdomen reveals "Status post left nephrectomy, the left nephrectomy bed appears normal.There is a small liver lesion and a small splenic lesion, they cannot be fully characterized but they are unchanged from 5/27/16 suggestive of benign lesions.Multiple right kidney cysts.Cholelithiasis.Soft tissue oval density noted within the mesentery adjacent to the aorta right iliac bifurcation, nonspecific, could represent an enlarged lymph node, it is unchanged from prior studies"    1/23/17- CT Chest reveals "Stable pulmonary nodules, likely benign. No new pulmonary parenchymal abnormalities are identified. Enlarging nodular foci in partially visualized left upper abdomen. Peritoneal implants not excluded. Multiple thyroid nodules, measuring up to 1.6 cm."    4/5/17-MRI abdomen reveals "Again seen are the T2 hyperintense hepatic and splenic lesions that appear similar to prior exam and possibly represents hemangiomas. There are right renal cysts and the patient is status post left nephrectomy."    Review of Systems   Constitutional: Positive for appetite change and fatigue. Negative for activity change, chills and fever.   HENT: Negative for congestion, hearing loss, mouth sores, sore throat, tinnitus and voice change.    Eyes: Negative for pain and visual disturbance.   Respiratory: Positive for cough and shortness of breath. Negative for wheezing.    Cardiovascular: Negative for chest pain, palpitations and leg swelling.   Gastrointestinal: Negative for abdominal pain, constipation, diarrhea, nausea and vomiting.   Endocrine: Negative for cold intolerance and heat intolerance.   Genitourinary: Negative for difficulty urinating, dyspareunia, dysuria, frequency, menstrual problem, urgency, vaginal bleeding, vaginal discharge and vaginal pain.   Musculoskeletal: Negative for arthralgias and myalgias.   Skin: Negative for color change, rash and wound.   Allergic/Immunologic: Negative for environmental " allergies and food allergies.   Neurological: Negative for weakness, numbness and headaches.   Hematological: Negative for adenopathy. Does not bruise/bleed easily.   Psychiatric/Behavioral: Negative for agitation, confusion, hallucinations and sleep disturbance. The patient is not nervous/anxious.    All other systems reviewed and are negative.      Allergies:  Review of patient's allergies indicates:   Allergen Reactions    Allopurinol      Other reaction(s): abnormal transaminases       Medications:  Current Outpatient Prescriptions   Medication Sig Dispense Refill    acetaminophen (TYLENOL) 500 MG tablet Take 500 mg by mouth every 6 (six) hours as needed for Pain.      aluminum-magnesium hydroxide-simethicone (MAALOX) 200-200-20 mg/5 mL Susp Take 30 mLs by mouth every 6 (six) hours as needed.       cabozantinib 60 mg Tab Take 60 mg by mouth once daily. 30 tablet 5    febuxostat (ULORIC) 40 mg Tab Take 1 tablet (40 mg total) by mouth once daily. 30 tablet 4    furosemide (LASIX) 40 MG tablet Take 1 tablet (40 mg total) by mouth daily as needed (weight gain of 3 lbs in 24 hrs or 5 lbs in 1 week). 30 tablet 11    hydrALAZINE (APRESOLINE) 25 MG tablet Take 1 tablet (25 mg total) by mouth every 12 (twelve) hours. 60 tablet 11    isosorbide mononitrate (IMDUR) 30 MG 24 hr tablet Take 1 tablet (30 mg total) by mouth once daily. 30 tablet 11    losartan (COZAAR) 100 MG tablet Take 1 tablet (100 mg total) by mouth once daily. 30 tablet 5    metoprolol succinate (TOPROL-XL) 25 MG 24 hr tablet Take 1 tablet (25 mg total) by mouth once daily. 30 tablet 2    multivitamin-Ca-iron-minerals (ONE-A-DAY WOMENS FORMULA) 27-0.4 mg Tab Take by mouth. 1 Tablet Oral Every day      ondansetron (ZOFRAN-ODT) 8 MG TbDL Take 1 tablet (8 mg total) by mouth every 12 (twelve) hours as needed. 30 tablet 1    psyllium husk-calcium (METAMUCIL PLUS CALCIUM) 1-60 gram-mg Cap Take by mouth. 2 Capsule Oral Every morning      sevelamer  HCl (RENAGEL) 800 MG Tab Take 2 tablets (1,600 mg total) by mouth 3 (three) times daily with meals. 180 tablet 11     No current facility-administered medications for this visit.        PMH:  Past Medical History:   Diagnosis Date    Anemia     Bronchitis 03/01/2017    Cancer 2016    kidney cancer    CMV (cytomegalovirus) antibody positive     Essential hypertension 9/23/2015    H/O herpes simplex type 2 infection     Herpes simplex type 1 antibody positive     History of kidney cancer     s/p left nephrectomy 1/2016    Hyperparathyroidism, unspecified     Hyperuricemia without signs of inflammatory arthritis and tophaceous disease     Kidney stones     LGSIL (low grade squamous intraepithelial dysplasia)     Prediabetes     Proteinuria     Renal disorder     Thyroid nodule     Urate nephropathy        PSH:  Past Surgical History:   Procedure Laterality Date    BREAST SURGERY      LUMPECTOMY    COLONOSCOPY N/A 11/12/2015    NEPHRECTOMY-LAPAROSCOPIC Left 01/12/2016    PERITONEAL CATHETER INSERTION      Permacath insertion  01/12/2017    SALPINGOOPHORECTOMY Right 2016    [facility]---DAVINCI    TONSILLECTOMY      TUBAL LIGATION         FamHx:  Family History   Problem Relation Age of Onset    Hypertension Mother     Diabetes Father     Kidney disease Father     Diabetes Maternal Grandfather     No Known Problems Sister     No Known Problems Brother     Heart disease Sister     No Known Problems Sister     No Known Problems Brother     Breast cancer Neg Hx     Colon cancer Neg Hx     Cancer Neg Hx     Stroke Neg Hx        SocHx:  Social History     Social History    Marital status:      Spouse name: N/A    Number of children: 2    Years of education: N/A     Occupational History    [employer]     Social History Main Topics    Smoking status: Never Smoker    Smokeless tobacco: Never Used    Alcohol use No      Comment: . 2 children. works at [employer].     Drug use: No    Sexual activity: Yes     Partners: Male     Other Topics Concern    Not on file     Social History Narrative    No narrative on file         Objective:      Physical Exam   Constitutional: She is oriented to person, place, and time. She appears well-developed and well-nourished. No distress.   HENT:   Head: Normocephalic and atraumatic.   Nose: Nose normal.   Mouth/Throat: Oropharynx is clear and moist. No oropharyngeal exudate.   Eyes: Conjunctivae and EOM are normal. Pupils are equal, round, and reactive to light. Right eye exhibits no discharge. Left eye exhibits no discharge.   Neck: Normal range of motion. Neck supple. No tracheal deviation present.   Cardiovascular: Normal rate and regular rhythm.  Exam reveals no gallop and no friction rub.    Murmur heard.  Pulmonary/Chest: Effort normal and breath sounds normal. No respiratory distress. She has no wheezes. She has no rales.   Abdominal: Soft. Bowel sounds are normal. She exhibits no distension. There is no tenderness. There is no rebound and no guarding.   Genitourinary: No breast tenderness, discharge or bleeding.   Musculoskeletal: Normal range of motion. She exhibits no edema or tenderness.   Neurological: She is alert and oriented to person, place, and time. She has normal reflexes. No cranial nerve deficit. Coordination normal.   Skin: Skin is warm and dry. No rash noted. She is not diaphoretic. No erythema. No pallor.   Psychiatric: She has a normal mood and affect. Her behavior is normal. Thought content normal.   Nursing note and vitals reviewed.      LABS:  WBC   Date Value Ref Range Status   05/31/2017 4.69 3.90 - 12.70 K/uL Final     Hemoglobin   Date Value Ref Range Status   05/31/2017 11.8 (L) 12.0 - 16.0 g/dL Final     Hematocrit   Date Value Ref Range Status   05/31/2017 38.3 37.0 - 48.5 % Final     Platelets   Date Value Ref Range Status   05/31/2017 167 150 - 350 K/uL Final       Chemistry        Component Value  Date/Time     05/31/2017 0856    K 4.1 05/31/2017 0856     05/31/2017 0856    CO2 28 05/31/2017 0856    BUN 44 (H) 05/31/2017 0856    CREATININE 8.2 (H) 05/31/2017 0856     05/31/2017 0856        Component Value Date/Time    CALCIUM 9.1 05/31/2017 0856    ALKPHOS 60 05/31/2017 0856    AST 34 05/31/2017 0856    ALT 46 (H) 05/31/2017 0856    BILITOT 1.0 05/31/2017 0856            Assessment:       1. Metastatic renal cell carcinoma, left    2. Malignant neoplasm metastatic to peritoneum    3. History of right oophorectomy    4. S/p nephrectomy left    5. Encounter for chemotherapy management    6. Anemia in ESRD (end-stage renal disease)    7. ESRD on hemodialysis    8. Hypertensive kidney disease with chronic kidney disease, stage 1-4 or unspecified chronic kidney disease    9. Abnormal echocardiogram    10. Thyroid nodule    11. Elevated LFTs        Plan:     1,2,3,4,5- Metastatic Renal Cell Carcinoma:    - It is unlikely that surgery removed all sites of metastatic disease. Given that this is c/w her previous papillary disease, pt started on oral cabozantinib 60mg daily (for dual VEGF and MET inhibition), especially given recent data that shows cabo is far superior to student in the first line setting.  If pt progresses on this regimen, will switch to nivolumab.     Restaging scans from 4/5/17 show stable disease and pt tolerating cabozantinib relatively well. Given recent decrease in ejection fraction, discussed with Dr. Rivera and he believes it is unlikely due to the cabozantinib. Therefore, we will continue cabozantinib for now. Cytology from thoracentesis did not show malignant cells. Will plan to get restaging scans in July 2017.     RTC in 4 weeks with labs (CBC,CMP) and to see me.     6- Mild, will monitor.    7,8,9- BP elevated today. Following with cardiology and PCP to manage. Having a PET stress to r/o ischemic cardiomyopathy. Starting metoprolol today.    10- Had thyroid US and  biopsy last year, which was negative. Will monitor for now.    11- Improving. Will monitor.    12- Slightly worse from baseline.  Lungs CTA. Due for dialysis tomorrow. If no improvement after dialysis, call and we can get chest XR.      Patient is in agreement with the proposed treatment plan. All questions were answered to the patient's satisfaction. Pt knows to call clinic if anything is needed before the next clinic visit.    TONA Telles-RODRÍGUEZ  Hematology and Medical Oncology

## 2017-06-02 ENCOUNTER — CLINICAL SUPPORT (OUTPATIENT)
Dept: CARDIOLOGY | Facility: CLINIC | Age: 52
End: 2017-06-02
Payer: COMMERCIAL

## 2017-06-02 DIAGNOSIS — I42.9 CARDIOMYOPATHY, UNSPECIFIED TYPE: ICD-10-CM

## 2017-06-02 LAB — DIASTOLIC DYSFUNCTION: NO

## 2017-06-02 PROCEDURE — A9555 RB82 RUBIDIUM: HCPCS | Mod: TXP,S$GLB,, | Performed by: INTERNAL MEDICINE

## 2017-06-02 PROCEDURE — 96372 THER/PROPH/DIAG INJ SC/IM: CPT | Mod: TXP,S$GLB,, | Performed by: INTERNAL MEDICINE

## 2017-06-02 PROCEDURE — 93015 CV STRESS TEST SUPVJ I&R: CPT | Mod: TXP,S$GLB,, | Performed by: INTERNAL MEDICINE

## 2017-06-02 PROCEDURE — 78492 MYOCRD IMG PET MLT RST&STRS: CPT | Mod: TXP,S$GLB,, | Performed by: INTERNAL MEDICINE

## 2017-06-07 ENCOUNTER — TELEPHONE (OUTPATIENT)
Dept: ELECTROPHYSIOLOGY | Facility: CLINIC | Age: 52
End: 2017-06-07

## 2017-06-07 NOTE — TELEPHONE ENCOUNTER
I called to relay the results of her recent PET stress test was unremarkable. She was given the opportunity to ask questions which were answered to her satisfaction.

## 2017-06-14 ENCOUNTER — OFFICE VISIT (OUTPATIENT)
Dept: FAMILY MEDICINE | Facility: CLINIC | Age: 52
End: 2017-06-14
Payer: COMMERCIAL

## 2017-06-14 VITALS
WEIGHT: 136.88 LBS | SYSTOLIC BLOOD PRESSURE: 138 MMHG | HEIGHT: 64 IN | TEMPERATURE: 98 F | HEART RATE: 80 BPM | DIASTOLIC BLOOD PRESSURE: 84 MMHG | OXYGEN SATURATION: 96 % | BODY MASS INDEX: 23.37 KG/M2

## 2017-06-14 DIAGNOSIS — C64.9 METASTATIC RENAL CELL CARCINOMA TO INTRA-ABDOMINAL SITE: ICD-10-CM

## 2017-06-14 DIAGNOSIS — F51.01 PRIMARY INSOMNIA: ICD-10-CM

## 2017-06-14 DIAGNOSIS — C79.89 METASTATIC RENAL CELL CARCINOMA TO INTRA-ABDOMINAL SITE: ICD-10-CM

## 2017-06-14 DIAGNOSIS — Z85.528 HISTORY OF KIDNEY CANCER: ICD-10-CM

## 2017-06-14 DIAGNOSIS — T45.1X5A CHEMOTHERAPY INDUCED NAUSEA AND VOMITING: ICD-10-CM

## 2017-06-14 DIAGNOSIS — N25.81 HYPERPARATHYROIDISM, SECONDARY RENAL: ICD-10-CM

## 2017-06-14 DIAGNOSIS — N18.6 ESRD ON DIALYSIS: ICD-10-CM

## 2017-06-14 DIAGNOSIS — R11.0 NAUSEA: ICD-10-CM

## 2017-06-14 DIAGNOSIS — I10 ESSENTIAL HYPERTENSION: Primary | ICD-10-CM

## 2017-06-14 DIAGNOSIS — Z99.2 ESRD ON DIALYSIS: ICD-10-CM

## 2017-06-14 DIAGNOSIS — R11.2 CHEMOTHERAPY INDUCED NAUSEA AND VOMITING: ICD-10-CM

## 2017-06-14 PROCEDURE — 99999 PR PBB SHADOW E&M-EST. PATIENT-LVL IV: CPT | Mod: PBBFAC,,, | Performed by: NURSE PRACTITIONER

## 2017-06-14 PROCEDURE — 99214 OFFICE O/P EST MOD 30 MIN: CPT | Mod: S$GLB,,, | Performed by: NURSE PRACTITIONER

## 2017-06-14 RX ORDER — TRAZODONE HYDROCHLORIDE 50 MG/1
50 TABLET ORAL NIGHTLY
Qty: 30 TABLET | Refills: 11 | Status: SHIPPED | OUTPATIENT
Start: 2017-06-14 | End: 2017-12-18

## 2017-06-14 RX ORDER — ONDANSETRON 8 MG/1
8 TABLET, ORALLY DISINTEGRATING ORAL EVERY 12 HOURS PRN
Qty: 30 TABLET | Refills: 1 | Status: SHIPPED | OUTPATIENT
Start: 2017-06-14 | End: 2018-01-29 | Stop reason: SDUPTHER

## 2017-06-14 NOTE — PROGRESS NOTES
Subjective:       Patient ID: Eva Bloom is a 51 y.o. female.    Chief Complaint: Hypertension (F/U)    51-year-old female presents to the clinic today for blood pressure checkup.  Her repeat blood pressure is 138/84.  She receives dialysis on Tuesday Thursday and Saturday.  She denies any headaches, dizziness, or blurred vision.  She denies any cardiac chest pain, heart palpitations, shortness breath, or swelling to lower extremities.  She complains of some nausea at times is out of her Zofran.  She is also been having trouble sleeping a lot like some medicine to help with that.      Past Medical History:   Diagnosis Date    Anemia     Bronchitis 03/01/2017    Cancer 2016    kidney cancer    CMV (cytomegalovirus) antibody positive     Essential hypertension 9/23/2015    H/O herpes simplex type 2 infection     Herpes simplex type 1 antibody positive     History of kidney cancer     s/p left nephrectomy 1/2016    Hyperparathyroidism, unspecified     Hyperuricemia without signs of inflammatory arthritis and tophaceous disease     Kidney stones     LGSIL (low grade squamous intraepithelial dysplasia)     Prediabetes     Proteinuria     Renal disorder     Thyroid nodule     Urate nephropathy      Past Surgical History:   Procedure Laterality Date    BREAST SURGERY      LUMPECTOMY    COLONOSCOPY N/A 11/12/2015    NEPHRECTOMY-LAPAROSCOPIC Left 01/12/2016    PERITONEAL CATHETER INSERTION      Permacath insertion  01/12/2017    SALPINGOOPHORECTOMY Right 2016    [facility]---DAVINCI    TONSILLECTOMY      TUBAL LIGATION        reports that she has never smoked. She has never used smokeless tobacco. She reports that she does not drink alcohol or use drugs.  Review of Systems   Respiratory: Negative for cough, shortness of breath and wheezing.    Cardiovascular: Negative for chest pain, palpitations and leg swelling.   Gastrointestinal: Positive for nausea. Negative for abdominal pain, diarrhea and  vomiting.   Musculoskeletal: Negative for gait problem.   Neurological: Negative for dizziness, light-headedness and headaches.   Psychiatric/Behavioral: Positive for sleep disturbance.       Objective:      Physical Exam   Constitutional: She is oriented to person, place, and time. She appears well-developed and well-nourished. No distress.   Eyes: Conjunctivae and EOM are normal. Pupils are equal, round, and reactive to light. Right eye exhibits no discharge. Left eye exhibits no discharge. No scleral icterus.   Neck: Normal range of motion. Neck supple. No JVD present.   Cardiovascular: Normal rate, regular rhythm and normal heart sounds.  Exam reveals no gallop and no friction rub.    No murmur heard.  Pulmonary/Chest: Effort normal and breath sounds normal. No respiratory distress. She has no wheezes. She has no rales.   Abdominal: Soft. Bowel sounds are normal. There is no tenderness.   Musculoskeletal: Normal range of motion. She exhibits no edema.   AV shunt left upper arm strong thrill noted    Neurological: She is alert and oriented to person, place, and time.   Skin: Skin is warm and dry. She is not diaphoretic.   Psychiatric: She has a normal mood and affect.       Assessment:       1. Essential hypertension    2. ESRD on dialysis    3. History of kidney cancer    4. Hyperparathyroidism, secondary renal    5. Metastatic renal cell carcinoma to intra-abdominal site    6. Nausea    7. Primary insomnia    8. Chemotherapy induced nausea and vomiting        Plan:         Essential hypertension  - The current medical regimen is effective;  continue present plan and medications.    ESRD on dialysis  - on dialysis T, Th and Sat     History of kidney cancer  - followed by oncology     Hyperparathyroidism, secondary renal  - stable observe    Metastatic renal cell carcinoma to intra-abdominal site  - followed by oncology    Nausea  - zofran as needed    Primary insomnia  -     trazodone (DESYREL) 50 MG tablet;  Take 1 tablet (50 mg total) by mouth every evening.  Dispense: 30 tablet; Refill: 11    Chemotherapy induced nausea and vomiting  -     ondansetron (ZOFRAN-ODT) 8 MG TbDL; Take 1 tablet (8 mg total) by mouth every 12 (twelve) hours as needed.  Dispense: 30 tablet; Refill: 1

## 2017-06-28 ENCOUNTER — TELEPHONE (OUTPATIENT)
Dept: HEMATOLOGY/ONCOLOGY | Facility: CLINIC | Age: 52
End: 2017-06-28

## 2017-06-28 ENCOUNTER — OFFICE VISIT (OUTPATIENT)
Dept: HEMATOLOGY/ONCOLOGY | Facility: CLINIC | Age: 52
End: 2017-06-28
Payer: COMMERCIAL

## 2017-06-28 ENCOUNTER — LAB VISIT (OUTPATIENT)
Dept: LAB | Facility: HOSPITAL | Age: 52
End: 2017-06-28
Payer: COMMERCIAL

## 2017-06-28 VITALS
HEART RATE: 98 BPM | RESPIRATION RATE: 16 BRPM | BODY MASS INDEX: 23.64 KG/M2 | HEIGHT: 64 IN | TEMPERATURE: 98 F | DIASTOLIC BLOOD PRESSURE: 97 MMHG | OXYGEN SATURATION: 95 % | WEIGHT: 138.44 LBS | SYSTOLIC BLOOD PRESSURE: 170 MMHG

## 2017-06-28 DIAGNOSIS — E04.1 THYROID NODULE: ICD-10-CM

## 2017-06-28 DIAGNOSIS — R79.89 ELEVATED LFTS: ICD-10-CM

## 2017-06-28 DIAGNOSIS — D63.1 ANEMIA IN ESRD (END-STAGE RENAL DISEASE): ICD-10-CM

## 2017-06-28 DIAGNOSIS — I12.9 HYPERTENSIVE KIDNEY DISEASE WITH CHRONIC KIDNEY DISEASE, STAGE 1-4 OR UNSPECIFIED CHRONIC KIDNEY DISEASE: ICD-10-CM

## 2017-06-28 DIAGNOSIS — R93.1 ABNORMAL ECHOCARDIOGRAM: ICD-10-CM

## 2017-06-28 DIAGNOSIS — N18.6 ANEMIA IN ESRD (END-STAGE RENAL DISEASE): ICD-10-CM

## 2017-06-28 DIAGNOSIS — R06.02 SHORTNESS OF BREATH: ICD-10-CM

## 2017-06-28 DIAGNOSIS — Z90.5 S/P NEPHRECTOMY: ICD-10-CM

## 2017-06-28 DIAGNOSIS — C78.6 MALIGNANT NEOPLASM METASTATIC TO PERITONEUM: ICD-10-CM

## 2017-06-28 DIAGNOSIS — Z99.2 ESRD ON HEMODIALYSIS: ICD-10-CM

## 2017-06-28 DIAGNOSIS — C64.2 METASTATIC RENAL CELL CARCINOMA, LEFT: ICD-10-CM

## 2017-06-28 DIAGNOSIS — N18.6 ESRD ON HEMODIALYSIS: ICD-10-CM

## 2017-06-28 DIAGNOSIS — Z51.11 ENCOUNTER FOR CHEMOTHERAPY MANAGEMENT: ICD-10-CM

## 2017-06-28 DIAGNOSIS — Z90.721 HISTORY OF RIGHT OOPHORECTOMY: ICD-10-CM

## 2017-06-28 DIAGNOSIS — C64.2 METASTATIC RENAL CELL CARCINOMA, LEFT: Primary | ICD-10-CM

## 2017-06-28 LAB
ALBUMIN SERPL BCP-MCNC: 2.7 G/DL
ALP SERPL-CCNC: 85 U/L
ALT SERPL W/O P-5'-P-CCNC: 46 U/L
ANION GAP SERPL CALC-SCNC: 10 MMOL/L
AST SERPL-CCNC: 30 U/L
BILIRUB SERPL-MCNC: 1.2 MG/DL
BUN SERPL-MCNC: 39 MG/DL
CALCIUM SERPL-MCNC: 9.4 MG/DL
CHLORIDE SERPL-SCNC: 98 MMOL/L
CO2 SERPL-SCNC: 35 MMOL/L
CREAT SERPL-MCNC: 7.7 MG/DL
ERYTHROCYTE [DISTWIDTH] IN BLOOD BY AUTOMATED COUNT: 21.4 %
EST. GFR  (AFRICAN AMERICAN): 6.3 ML/MIN/1.73 M^2
EST. GFR  (NON AFRICAN AMERICAN): 5.5 ML/MIN/1.73 M^2
GLUCOSE SERPL-MCNC: 133 MG/DL
HCT VFR BLD AUTO: 33.3 %
HGB BLD-MCNC: 10.6 G/DL
MCH RBC QN AUTO: 30.8 PG
MCHC RBC AUTO-ENTMCNC: 31.8 %
MCV RBC AUTO: 97 FL
NEUTROPHILS # BLD AUTO: 2.4 K/UL
PLATELET # BLD AUTO: 152 K/UL
PMV BLD AUTO: 10.9 FL
POTASSIUM SERPL-SCNC: 4.3 MMOL/L
PROT SERPL-MCNC: 5.6 G/DL
RBC # BLD AUTO: 3.44 M/UL
SODIUM SERPL-SCNC: 143 MMOL/L
WBC # BLD AUTO: 4.52 K/UL

## 2017-06-28 PROCEDURE — 85027 COMPLETE CBC AUTOMATED: CPT | Mod: TXP

## 2017-06-28 PROCEDURE — 36415 COLL VENOUS BLD VENIPUNCTURE: CPT | Mod: NTX

## 2017-06-28 PROCEDURE — 99999 PR PBB SHADOW E&M-EST. PATIENT-LVL IV: CPT | Mod: PBBFAC,,, | Performed by: NURSE PRACTITIONER

## 2017-06-28 PROCEDURE — 80053 COMPREHEN METABOLIC PANEL: CPT | Mod: TXP

## 2017-06-28 PROCEDURE — 99214 OFFICE O/P EST MOD 30 MIN: CPT | Mod: S$GLB,,, | Performed by: NURSE PRACTITIONER

## 2017-06-28 NOTE — TELEPHONE ENCOUNTER
spoke with  to discuss scheduling MRI and ct scan,  will have pt to  call office back to discuss times with .

## 2017-06-28 NOTE — Clinical Note
Schedule MRI abdomen and CT chest at [facility] and RTC next Friday to review with Dr. Rivera.

## 2017-06-28 NOTE — PROGRESS NOTES
"Subjective:       Patient ID: Eva Bloom is a 51 y.o. female.    Chief Complaint: Metastatic renal cell carcinoma, left    HPI   51 year old female, patient of Dr. Rivera, to clinic for 4 week follow up and to review labs for recurrent metastatic renal cell carcinoma. Pt is currently taking cabozantinib daily. Pt is on ESRD with HD every TTS.  Patient reports lower abdominal pain with coughing and RUQ pain.    Today, she denies any mouth sores, nausea, vomiting, diarrhea, constipation,weight loss, chest pain, leg swelling, headache, dizziness, or mood changes.    ECOG 2. She is accompanied to clinic with her .    Oncologic History (From Chart and Patient):  Eva Bloom is a 51 y.o. female with ESRD on HD who was found to have two L renal masses. She underwent L lap nephrectomy on 1/12/16. 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 Margins were negative.  Shehealed from surgery well, but then developed a large ovarian mass.  This was removed by gyn along with multiple sites of metastatic disease in the pelvis.  Path was c/w recurrence of RCC.     11/20/16 Pelvic ultrasound reveals "Large heterogeneous cystic and solid mass which appears to arise from the right ovary with worrisome imaging features for malignancy.  Differential diagnosis includes malignant tumors such as serous or mucinous cystadenocarcinoma.  It is important to note that the patient is at risk for ovarian torsion due to the size of the mass.Multiple uterine fibroids are identified with the largest in the uterine fundus."    11/22/16 pathology reveals "FINAL PATHOLOGIC DIAGNOSIS-RIGHT OVARY AND FALLOPIAN TUBE, RIGHT SALPINGO-OOPHORECTOMY:  -Positive for malignancy, high grade carcinoma morphologically and immunohistochemically consistent with metastasis from the patient's known renal cell carcinoma"    1/23/17 MRI " "abdomen reveals "Status post left nephrectomy, the left nephrectomy bed appears normal.There is a small liver lesion and a small splenic lesion, they cannot be fully characterized but they are unchanged from 5/27/16 suggestive of benign lesions.Multiple right kidney cysts.Cholelithiasis.Soft tissue oval density noted within the mesentery adjacent to the aorta right iliac bifurcation, nonspecific, could represent an enlarged lymph node, it is unchanged from prior studies"    1/23/17- CT Chest reveals "Stable pulmonary nodules, likely benign. No new pulmonary parenchymal abnormalities are identified. Enlarging nodular foci in partially visualized left upper abdomen. Peritoneal implants not excluded. Multiple thyroid nodules, measuring up to 1.6 cm."    4/5/17-MRI abdomen reveals "Again seen are the T2 hyperintense hepatic and splenic lesions that appear similar to prior exam and possibly represents hemangiomas. There are right renal cysts and the patient is status post left nephrectomy."    Review of Systems   Constitutional: Positive for appetite change and fatigue. Negative for activity change, chills and fever.   HENT: Negative for congestion, hearing loss, mouth sores, sore throat, tinnitus and voice change.    Eyes: Negative for pain and visual disturbance.   Respiratory: Positive for cough and shortness of breath. Negative for wheezing.    Cardiovascular: Negative for chest pain, palpitations and leg swelling.   Gastrointestinal: Positive for abdominal pain. Negative for constipation, diarrhea, nausea and vomiting.   Endocrine: Negative for cold intolerance and heat intolerance.   Genitourinary: Negative for difficulty urinating, dyspareunia, dysuria, frequency, menstrual problem, urgency, vaginal bleeding, vaginal discharge and vaginal pain.   Musculoskeletal: Negative for arthralgias and myalgias.   Skin: Negative for color change, rash and wound.   Allergic/Immunologic: Negative for environmental allergies " and food allergies.   Neurological: Negative for weakness, numbness and headaches.   Hematological: Negative for adenopathy. Does not bruise/bleed easily.   Psychiatric/Behavioral: Negative for agitation, confusion, hallucinations and sleep disturbance. The patient is not nervous/anxious.    All other systems reviewed and are negative.      Allergies:  Review of patient's allergies indicates:   Allergen Reactions    Allopurinol      Other reaction(s): abnormal transaminases       Medications:  Current Outpatient Prescriptions   Medication Sig Dispense Refill    acetaminophen (TYLENOL) 500 MG tablet Take 500 mg by mouth every 6 (six) hours as needed for Pain.      aluminum-magnesium hydroxide-simethicone (MAALOX) 200-200-20 mg/5 mL Susp Take 30 mLs by mouth every 6 (six) hours as needed.       cabozantinib 60 mg Tab Take 60 mg by mouth once daily. 30 tablet 5    febuxostat (ULORIC) 40 mg Tab Take 1 tablet (40 mg total) by mouth once daily. 30 tablet 4    furosemide (LASIX) 40 MG tablet Take 1 tablet (40 mg total) by mouth daily as needed (weight gain of 3 lbs in 24 hrs or 5 lbs in 1 week). 30 tablet 11    hydrALAZINE (APRESOLINE) 25 MG tablet Take 1 tablet (25 mg total) by mouth every 12 (twelve) hours. 60 tablet 11    isosorbide mononitrate (IMDUR) 30 MG 24 hr tablet Take 1 tablet (30 mg total) by mouth once daily. 30 tablet 11    losartan (COZAAR) 100 MG tablet Take 1 tablet (100 mg total) by mouth once daily. 30 tablet 5    metoprolol succinate (TOPROL-XL) 25 MG 24 hr tablet Take 1 tablet (25 mg total) by mouth once daily. 30 tablet 2    multivitamin-Ca-iron-minerals (ONE-A-DAY WOMENS FORMULA) 27-0.4 mg Tab Take by mouth. 1 Tablet Oral Every day      ondansetron (ZOFRAN-ODT) 8 MG TbDL Take 1 tablet (8 mg total) by mouth every 12 (twelve) hours as needed. 30 tablet 1    psyllium husk-calcium (METAMUCIL PLUS CALCIUM) 1-60 gram-mg Cap Take by mouth. 2 Capsule Oral Every morning      sevelamer HCl  (RENAGEL) 800 MG Tab Take 2 tablets (1,600 mg total) by mouth 3 (three) times daily with meals. 180 tablet 11    trazodone (DESYREL) 50 MG tablet Take 1 tablet (50 mg total) by mouth every evening. 30 tablet 11     No current facility-administered medications for this visit.        PMH:  Past Medical History:   Diagnosis Date    Anemia     Bronchitis 03/01/2017    Cancer 2016    kidney cancer    CMV (cytomegalovirus) antibody positive     Essential hypertension 9/23/2015    H/O herpes simplex type 2 infection     Herpes simplex type 1 antibody positive     History of kidney cancer     s/p left nephrectomy 1/2016    Hyperparathyroidism, unspecified     Hyperuricemia without signs of inflammatory arthritis and tophaceous disease     Kidney stones     LGSIL (low grade squamous intraepithelial dysplasia)     Prediabetes     Proteinuria     Renal disorder     Thyroid nodule     Urate nephropathy        PSH:  Past Surgical History:   Procedure Laterality Date    BREAST SURGERY      LUMPECTOMY    COLONOSCOPY N/A 11/12/2015    NEPHRECTOMY-LAPAROSCOPIC Left 01/12/2016    PERITONEAL CATHETER INSERTION      Permacath insertion  01/12/2017    SALPINGOOPHORECTOMY Right 2016    KJB---DAVINCI    TONSILLECTOMY      TUBAL LIGATION         FamHx:  Family History   Problem Relation Age of Onset    Hypertension Mother     Diabetes Father     Kidney disease Father     Diabetes Maternal Grandfather     No Known Problems Sister     No Known Problems Brother     Heart disease Sister     No Known Problems Sister     No Known Problems Brother     Breast cancer Neg Hx     Colon cancer Neg Hx     Cancer Neg Hx     Stroke Neg Hx        SocHx:  Social History     Social History    Marital status:      Spouse name: N/A    Number of children: 2    Years of education: N/A     Occupational History    [id number]     Social History Main Topics    Smoking status: Never Smoker     Smokeless tobacco: Never Used    Alcohol use No      Comment: . 2 children. works at [employer].    Drug use: No    Sexual activity: Yes     Partners: Male     Other Topics Concern    Not on file     Social History Narrative    No narrative on file         Objective:      Physical Exam   Constitutional: She is oriented to person, place, and time. She appears well-developed and well-nourished. No distress.   HENT:   Head: Normocephalic and atraumatic.   Nose: Nose normal.   Mouth/Throat: Oropharynx is clear and moist. No oropharyngeal exudate.   Eyes: Conjunctivae and EOM are normal. Pupils are equal, round, and reactive to light. Right eye exhibits no discharge. Left eye exhibits no discharge.   Neck: Normal range of motion. Neck supple. No tracheal deviation present.   Cardiovascular: Normal rate and regular rhythm.  Exam reveals no gallop and no friction rub.    Murmur heard.  Pulmonary/Chest: Effort normal and breath sounds normal. No respiratory distress. She has no wheezes. She has no rales.   Abdominal: Soft. Bowel sounds are normal. She exhibits no distension. There is tenderness (Ruq). There is no rebound and no guarding.   Genitourinary: No breast tenderness, discharge or bleeding.   Musculoskeletal: Normal range of motion. She exhibits no edema or tenderness.   Neurological: She is alert and oriented to person, place, and time. She has normal reflexes. No cranial nerve deficit. Coordination normal.   Skin: Skin is warm and dry. No rash noted. She is not diaphoretic. No erythema. No pallor.   Psychiatric: She has a normal mood and affect. Her behavior is normal. Thought content normal.   Nursing note and vitals reviewed.      LABS:  WBC   Date Value Ref Range Status   05/31/2017 4.69 3.90 - 12.70 K/uL Final     Hemoglobin   Date Value Ref Range Status   05/31/2017 11.8 (L) 12.0 - 16.0 g/dL Final     Hematocrit   Date Value Ref Range Status   05/31/2017 38.3 37.0 - 48.5 % Final     Platelets   Date  Value Ref Range Status   05/31/2017 167 150 - 350 K/uL Final       Chemistry        Component Value Date/Time     05/31/2017 0856    K 4.1 05/31/2017 0856     05/31/2017 0856    CO2 28 05/31/2017 0856    BUN 44 (H) 05/31/2017 0856    CREATININE 8.2 (H) 05/31/2017 0856     05/31/2017 0856        Component Value Date/Time    CALCIUM 9.1 05/31/2017 0856    ALKPHOS 60 05/31/2017 0856    AST 34 05/31/2017 0856    ALT 46 (H) 05/31/2017 0856    BILITOT 1.0 05/31/2017 0856            Assessment:       1. Metastatic renal cell carcinoma, left    2. Malignant neoplasm metastatic to peritoneum    3. History of right oophorectomy    4. S/p nephrectomy left    5. Encounter for chemotherapy management    6. Anemia in ESRD (end-stage renal disease)    7. ESRD on hemodialysis    8. Hypertensive kidney disease with chronic kidney disease, stage 1-4 or unspecified chronic kidney disease    9. Abnormal echocardiogram    10. Thyroid nodule    11. Elevated LFTs        Plan:     1,2,3,4,5- Metastatic Renal Cell Carcinoma:    - It is unlikely that surgery removed all sites of metastatic disease. Given that this is c/w her previous papillary disease, pt started on oral cabozantinib 60mg daily (for dual VEGF and MET inhibition), especially given recent data that shows cabo is far superior to student in the first line setting.  If pt progresses on this regimen, will switch to nivolumab.     Restaging scans from 4/5/17 show stable disease and pt tolerating cabozantinib relatively well. Given recent decrease in ejection fraction, discussed with Dr. Rivera and he believes it is unlikely due to the cabozantinib. Therefore, we will continue cabozantinib for now. Cytology from thoracentesis did not show malignant cells. Will plan to get restaging scans in July 2017.     Labs improved. Schedule MRI abdomen and CT chest on [facility] and RT to review with Dr. Rivera.    6- Mild, will monitor.    7,8,9- BP elevated today.  Following with cardiology and PCP to manage.     10- Had thyroid US and biopsy last year, which was negative. Will monitor for now.    11- Improving. Will monitor.      Patient is in agreement with the proposed treatment plan. All questions were answered to the patient's satisfaction. Pt knows to call clinic if anything is needed before the next clinic visit.    NAS [facility]elles  Hematology and Medical Oncology

## 2017-06-28 NOTE — TELEPHONE ENCOUNTER
----- Message from Keny Farris sent at 6/28/2017  4:09 PM CDT -----  Contact: Pt  Returning Aakash call,       Call: [phone]

## 2017-06-28 NOTE — TELEPHONE ENCOUNTER
----- Message from Liset Ngo NP sent at 6/28/2017  1:52 PM CDT -----  Regarding: FW:  Can you see if she can do [facility] or somewhere that has availability?   ----- Message -----  From: Flores Mccormack RN  Sent: 6/28/2017   1:45 PM  To: Liset Ngo NP  Subject: RE:                                              They do. But they don't have anything before mid august.  The after that---the schedule isn't open.    ----- Message -----  From: Liset Ngo NP  Sent: 6/28/2017   1:09 PM  To: Flores Mccomrack RN  Subject: FW:                                                Do you know if [facility] has MRI?  ----- Message -----  From: Manjula Arevalo  Sent: 6/28/2017  12:11 PM  To: Liset Ngo NP    I am not pulling up MRI on the [facility], it is saying nothing is available. The first MRI here is not until 7/12    Message   Received: Today   Message Contents   TED [place]         Schedule MRI abdomen and CT chest at [facility] ASAP and RTC next Friday to review with Dr. Rivera.

## 2017-06-29 RX ORDER — HYDRALAZINE HYDROCHLORIDE 50 MG/1
50 TABLET, FILM COATED ORAL EVERY 12 HOURS
Qty: 60 TABLET | Refills: 11 | Status: SHIPPED | OUTPATIENT
Start: 2017-06-29 | End: 2017-08-10 | Stop reason: SDUPTHER

## 2017-07-03 ENCOUNTER — HOSPITAL ENCOUNTER (OUTPATIENT)
Dept: RADIOLOGY | Facility: OTHER | Age: 52
Discharge: HOME | End: 2017-07-03
Attending: NURSE PRACTITIONER
Payer: COMMERCIAL

## 2017-07-03 DIAGNOSIS — I12.9 HYPERTENSIVE KIDNEY DISEASE WITH CHRONIC KIDNEY DISEASE, STAGE 1-4 OR UNSPECIFIED CHRONIC KIDNEY DISEASE: ICD-10-CM

## 2017-07-03 DIAGNOSIS — C78.6 MALIGNANT NEOPLASM METASTATIC TO PERITONEUM: ICD-10-CM

## 2017-07-03 DIAGNOSIS — E04.1 THYROID NODULE: ICD-10-CM

## 2017-07-03 DIAGNOSIS — N18.6 ANEMIA IN ESRD (END-STAGE RENAL DISEASE): ICD-10-CM

## 2017-07-03 DIAGNOSIS — Z90.5 S/P NEPHRECTOMY: ICD-10-CM

## 2017-07-03 DIAGNOSIS — Z90.721 HISTORY OF RIGHT OOPHORECTOMY: ICD-10-CM

## 2017-07-03 DIAGNOSIS — N18.6 ESRD ON HEMODIALYSIS: ICD-10-CM

## 2017-07-03 DIAGNOSIS — R79.89 ELEVATED LFTS: ICD-10-CM

## 2017-07-03 DIAGNOSIS — Z99.2 ESRD ON HEMODIALYSIS: ICD-10-CM

## 2017-07-03 DIAGNOSIS — R93.1 ABNORMAL ECHOCARDIOGRAM: ICD-10-CM

## 2017-07-03 DIAGNOSIS — D63.1 ANEMIA IN ESRD (END-STAGE RENAL DISEASE): ICD-10-CM

## 2017-07-03 DIAGNOSIS — C64.2 METASTATIC RENAL CELL CARCINOMA, LEFT: ICD-10-CM

## 2017-07-03 DIAGNOSIS — Z51.11 ENCOUNTER FOR CHEMOTHERAPY MANAGEMENT: ICD-10-CM

## 2017-07-03 PROCEDURE — 71250 CT THORAX DX C-: CPT | Mod: 26,NTX,, | Performed by: INTERNAL MEDICINE

## 2017-07-03 PROCEDURE — 74181 MRI ABDOMEN W/O CONTRAST: CPT | Mod: TC,TXP

## 2017-07-03 PROCEDURE — 71250 CT THORAX DX C-: CPT | Mod: TC,NTX

## 2017-07-03 PROCEDURE — 74181 MRI ABDOMEN W/O CONTRAST: CPT | Mod: 26,NTX,, | Performed by: RADIOLOGY

## 2017-07-06 NOTE — PROGRESS NOTES
"Subjective:       Patient ID: Eva Bloom is a 51 y.o. female.    Chief Complaint: renal cell carcinoma    HPI   51 year old female to clinic for 1 week follow up and to review restaging scans for recurrent metastatic renal cell carcinoma. Pt is currently taking cabozantinib daily. Pt is on ESRD with HD every TTS.     Today, she denies any mouth sores, nausea, vomiting, diarrhea, constipation,weight loss, chest pain, leg swelling, headache, dizziness, or mood changes.    ECOG 2. She is accompanied to clinic with her .    Oncologic History (From Chart and Patient):  Eva Bloom is a 51 y.o. female with ESRD on HD who was found to have two L renal masses. She underwent L lap nephrectomy on 1/12/16. 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 Margins were negative.  Shehealed from surgery well, but then developed a large ovarian mass.  This was removed by gyn along with multiple sites of metastatic disease in the pelvis.  Path was c/w recurrence of RCC.     11/20/16 Pelvic ultrasound reveals "Large heterogeneous cystic and solid mass which appears to arise from the right ovary with worrisome imaging features for malignancy.  Differential diagnosis includes malignant tumors such as serous or mucinous cystadenocarcinoma.  It is important to note that the patient is at risk for ovarian torsion due to the size of the mass.Multiple uterine fibroids are identified with the largest in the uterine fundus."    11/22/16 pathology reveals "FINAL PATHOLOGIC DIAGNOSIS-RIGHT OVARY AND FALLOPIAN TUBE, RIGHT SALPINGO-OOPHORECTOMY:  -Positive for malignancy, high grade carcinoma morphologically and immunohistochemically consistent with metastasis from the patient's known renal cell carcinoma"    1/23/17 MRI abdomen reveals "Status post left nephrectomy, the left nephrectomy bed appears normal.There is " "a small liver lesion and a small splenic lesion, they cannot be fully characterized but they are unchanged from 5/27/16 suggestive of benign lesions.Multiple right kidney cysts.Cholelithiasis.Soft tissue oval density noted within the mesentery adjacent to the aorta right iliac bifurcation, nonspecific, could represent an enlarged lymph node, it is unchanged from prior studies"    1/23/17- CT Chest reveals "Stable pulmonary nodules, likely benign. No new pulmonary parenchymal abnormalities are identified. Enlarging nodular foci in partially visualized left upper abdomen. Peritoneal implants not excluded. Multiple thyroid nodules, measuring up to 1.6 cm."    4/5/17-MRI abdomen reveals "Again seen are the T2 hyperintense hepatic and splenic lesions that appear similar to prior exam and possibly represents hemangiomas. There are right renal cysts and the patient is status post left nephrectomy."    7/3/17- MRI abdomen "Left nephrectomy.  Right renal cysts with no malignant features.Liver and splenic lesions consistent with cysts versus hemangiomas.Cholelithiasis.Right pleural effusion and right basal atelectasis.Small pericardial effusion.Body wall edema and mesenteric edema."  7/3/17- CT chest "In this patient with a history of left renal cell carcinoma, there are 2 right lung nodules which are stable compared to January 2017.  2 left lung nodules seen on that study are not well-seen today, may be related to a small amount of pleural fluid.Bilateral pleural effusions, mildly decreased compared to prior study.Small amount of pericardial fluid, new."    Review of Systems   Constitutional: Positive for appetite change and fatigue. Negative for activity change, chills and fever.   HENT: Negative for congestion, hearing loss, mouth sores, sore throat, tinnitus and voice change.    Eyes: Negative for pain and visual disturbance.   Respiratory: Positive for cough and shortness of breath. Negative for wheezing.  "   Cardiovascular: Negative for chest pain, palpitations and leg swelling.   Gastrointestinal: Positive for abdominal pain. Negative for constipation, diarrhea, nausea and vomiting.   Endocrine: Negative for cold intolerance and heat intolerance.   Genitourinary: Negative for difficulty urinating, dyspareunia, dysuria, frequency, menstrual problem, urgency, vaginal bleeding, vaginal discharge and vaginal pain.   Musculoskeletal: Negative for arthralgias and myalgias.   Skin: Negative for color change, rash and wound.   Allergic/Immunologic: Negative for environmental allergies and food allergies.   Neurological: Negative for weakness, numbness and headaches.   Hematological: Negative for adenopathy. Does not bruise/bleed easily.   Psychiatric/Behavioral: Negative for agitation, confusion, hallucinations and sleep disturbance. The patient is not nervous/anxious.    All other systems reviewed and are negative.      Allergies:  Review of patient's allergies indicates:   Allergen Reactions    Allopurinol      Other reaction(s): abnormal transaminases       Medications:  Current Outpatient Prescriptions   Medication Sig Dispense Refill    acetaminophen (TYLENOL) 500 MG tablet Take 500 mg by mouth every 6 (six) hours as needed for Pain.      aluminum-magnesium hydroxide-simethicone (MAALOX) 200-200-20 mg/5 mL Susp Take 30 mLs by mouth every 6 (six) hours as needed.       cabozantinib 60 mg Tab Take 60 mg by mouth once daily. 30 tablet 5    febuxostat (ULORIC) 40 mg Tab Take 1 tablet (40 mg total) by mouth once daily. 30 tablet 4    furosemide (LASIX) 40 MG tablet Take 1 tablet (40 mg total) by mouth daily as needed (weight gain of 3 lbs in 24 hrs or 5 lbs in 1 week). 30 tablet 11    hydrALAZINE (APRESOLINE) 50 MG tablet Take 1 tablet (50 mg total) by mouth every 12 (twelve) hours. 60 tablet 11    isosorbide mononitrate (IMDUR) 30 MG 24 hr tablet Take 1 tablet (30 mg total) by mouth once daily. 30 tablet 11     losartan (COZAAR) 100 MG tablet Take 1 tablet (100 mg total) by mouth once daily. 30 tablet 5    metoprolol succinate (TOPROL-XL) 25 MG 24 hr tablet Take 1 tablet (25 mg total) by mouth once daily. 30 tablet 2    multivitamin-Ca-iron-minerals (ONE-A-DAY WOMENS FORMULA) 27-0.4 mg Tab Take by mouth. 1 Tablet Oral Every day      ondansetron (ZOFRAN-ODT) 8 MG TbDL Take 1 tablet (8 mg total) by mouth every 12 (twelve) hours as needed. 30 tablet 1    psyllium husk-calcium (METAMUCIL PLUS CALCIUM) 1-60 gram-mg Cap Take by mouth. 2 Capsule Oral Every morning      sevelamer HCl (RENAGEL) 800 MG Tab Take 2 tablets (1,600 mg total) by mouth 3 (three) times daily with meals. 180 tablet 11    trazodone (DESYREL) 50 MG tablet Take 1 tablet (50 mg total) by mouth every evening. 30 tablet 11     No current facility-administered medications for this visit.        PMH:  Past Medical History:   Diagnosis Date    Anemia     Bronchitis 03/01/2017    Cancer 2016    kidney cancer    CMV (cytomegalovirus) antibody positive     Essential hypertension 9/23/2015    H/O herpes simplex type 2 infection     Herpes simplex type 1 antibody positive     History of kidney cancer     s/p left nephrectomy 1/2016    Hyperparathyroidism, unspecified     Hyperuricemia without signs of inflammatory arthritis and tophaceous disease     Kidney stones     LGSIL (low grade squamous intraepithelial dysplasia)     Prediabetes     Proteinuria     Renal disorder     Thyroid nodule     Urate nephropathy        PSH:  Past Surgical History:   Procedure Laterality Date    BREAST SURGERY      LUMPECTOMY    COLONOSCOPY N/A 11/12/2015    NEPHRECTOMY-LAPAROSCOPIC Left 01/12/2016    PERITONEAL CATHETER INSERTION      Permacath insertion  01/12/2017    SALPINGOOPHORECTOMY Right 2016    KJB---DAVINCI    TONSILLECTOMY      TUBAL LIGATION         FamHx:  Family History   Problem Relation Age of Onset    Hypertension Mother     Diabetes Father      Kidney disease Father     Diabetes Maternal Grandfather     No Known Problems Sister     No Known Problems Brother     Heart disease Sister     No Known Problems Sister     No Known Problems Brother     Breast cancer Neg Hx     Colon cancer Neg Hx     Cancer Neg Hx     Stroke Neg Hx        SocHx:  Social History     Social History    Marital status:      Spouse name: N/A    Number of children: 2    Years of education: N/A     Occupational History    [employer] #[id number]     Social History Main Topics    Smoking status: Never Smoker    Smokeless tobacco: Never Used    Alcohol use No      Comment: . 2 children. works at [employer].    Drug use: No    Sexual activity: Yes     Partners: Male     Other Topics Concern    Not on file     Social History Narrative    No narrative on file         Objective:      Physical Exam   Constitutional: She is oriented to person, place, and time. She appears well-developed and well-nourished. No distress.   HENT:   Head: Normocephalic and atraumatic.   Nose: Nose normal.   Mouth/Throat: Oropharynx is clear and moist. No oropharyngeal exudate.   Eyes: Conjunctivae and EOM are normal. Pupils are equal, round, and reactive to light. Right eye exhibits no discharge. Left eye exhibits no discharge.   Neck: Normal range of motion. Neck supple. No tracheal deviation present.   Cardiovascular: Normal rate and regular rhythm.  Exam reveals no gallop and no friction rub.    Murmur heard.  Pulmonary/Chest: Effort normal and breath sounds normal. No respiratory distress. She has no wheezes. She has no rales.   Abdominal: Soft. Bowel sounds are normal. She exhibits no distension. There is tenderness (Ruq). There is no rebound and no guarding.   Genitourinary: No breast tenderness, discharge or bleeding.   Musculoskeletal: Normal range of motion. She exhibits no edema or tenderness.   Neurological: She is alert and oriented to person, place, and time. She  has normal reflexes. No cranial nerve deficit. Coordination normal.   Skin: Skin is warm and dry. No rash noted. She is not diaphoretic. No erythema. No pallor.   Psychiatric: She has a normal mood and affect. Her behavior is normal. Thought content normal.   Nursing note and vitals reviewed.      LABS:  WBC   Date Value Ref Range Status   06/28/2017 4.52 3.90 - 12.70 K/uL Final     Hemoglobin   Date Value Ref Range Status   06/28/2017 10.6 (L) 12.0 - 16.0 g/dL Final     Hematocrit   Date Value Ref Range Status   06/28/2017 33.3 (L) 37.0 - 48.5 % Final     Platelets   Date Value Ref Range Status   06/28/2017 152 150 - 350 K/uL Final       Chemistry        Component Value Date/Time     06/28/2017 1015    K 4.3 06/28/2017 1015    CL 98 06/28/2017 1015    CO2 35 (H) 06/28/2017 1015    BUN 39 (H) 06/28/2017 1015    CREATININE 7.7 (H) 06/28/2017 1015     (H) 06/28/2017 1015        Component Value Date/Time    CALCIUM 9.4 06/28/2017 1015    ALKPHOS 85 06/28/2017 1015    AST 30 06/28/2017 1015    ALT 46 (H) 06/28/2017 1015    BILITOT 1.2 (H) 06/28/2017 1015            Assessment:       1. Metastatic renal cell carcinoma, left    2. Malignant neoplasm metastatic to peritoneum    3. History of right oophorectomy    4. S/p nephrectomy left    5. Encounter for chemotherapy management    6. Anemia in ESRD (end-stage renal disease)    7. ESRD on hemodialysis    8. Hypertensive kidney disease with chronic kidney disease, stage 1-4 or unspecified chronic kidney disease    9. Abnormal echocardiogram    10. Thyroid nodule    11. Elevated LFTs        Plan:     1,2,3,4,5- Metastatic Renal Cell Carcinoma:    - It is unlikely that surgery removed all sites of metastatic disease. Given that this is c/w her previous papillary disease, pt started on oral cabozantinib 60mg daily (for dual VEGF and MET inhibition), especially given recent data that shows cabo is far superior to student in the first line setting.  If pt  progresses on this regimen, will switch to nivolumab.     Restaging scans from 7/3/17 show stable disease and pt tolerating cabozantinib relatively well. We will continue cabozantinib for now. Cytology from thoracentesis did not show malignant cells. Will plan to get restaging scans in October 2017.     RTC in 6 weeks with labs (CBC,CMP) and to see me.    6- Mild, will monitor.    7,8,9- BP elevated today. Following with cardiology and PCP to manage.     10- Had thyroid US and biopsy last year, which was negative. Will monitor for now.    11- Improving. Will monitor.      Patient was also seen and examined by Dr. Rivera. Patient is in agreement with the proposed treatment plan. All questions were answered to the patient's satisfaction. Pt knows to call clinic if anything is needed before the next clinic visit.    Liset Ngo, TONA-RODRÍGUEZ  Hematology and Medical Oncology      I have reviewed the notes, assessments, and/or procedures performed by the nurse practitioner, as above.  I have personally interviewed the patient at the beside, and rounded with the nurse practitioner. I formulated the plan of care.  I concur with her documentation of Eva Bloom.  More than 25 mins were spent during this encounter, greater than 50% was spent in direct counseling and/or coordination of care.     Ben Rivera M.D., M.S., F.A.C.P.  Hematology/Oncology Attending  [facility]

## 2017-07-07 ENCOUNTER — OFFICE VISIT (OUTPATIENT)
Dept: HEMATOLOGY/ONCOLOGY | Facility: CLINIC | Age: 52
End: 2017-07-07
Payer: COMMERCIAL

## 2017-07-07 VITALS
RESPIRATION RATE: 16 BRPM | BODY MASS INDEX: 22.85 KG/M2 | SYSTOLIC BLOOD PRESSURE: 178 MMHG | HEIGHT: 64 IN | TEMPERATURE: 99 F | DIASTOLIC BLOOD PRESSURE: 89 MMHG | WEIGHT: 133.81 LBS | HEART RATE: 96 BPM | OXYGEN SATURATION: 96 %

## 2017-07-07 DIAGNOSIS — C78.6 MALIGNANT NEOPLASM METASTATIC TO PERITONEUM: ICD-10-CM

## 2017-07-07 DIAGNOSIS — N18.6 ESRD ON HEMODIALYSIS: ICD-10-CM

## 2017-07-07 DIAGNOSIS — I12.9 HYPERTENSIVE KIDNEY DISEASE WITH CHRONIC KIDNEY DISEASE, STAGE 1-4 OR UNSPECIFIED CHRONIC KIDNEY DISEASE: ICD-10-CM

## 2017-07-07 DIAGNOSIS — N18.6 ANEMIA IN ESRD (END-STAGE RENAL DISEASE): ICD-10-CM

## 2017-07-07 DIAGNOSIS — E04.1 THYROID NODULE: ICD-10-CM

## 2017-07-07 DIAGNOSIS — R93.1 ABNORMAL ECHOCARDIOGRAM: ICD-10-CM

## 2017-07-07 DIAGNOSIS — Z51.11 ENCOUNTER FOR CHEMOTHERAPY MANAGEMENT: ICD-10-CM

## 2017-07-07 DIAGNOSIS — Z90.721 HISTORY OF RIGHT OOPHORECTOMY: ICD-10-CM

## 2017-07-07 DIAGNOSIS — Z90.5 S/P NEPHRECTOMY: ICD-10-CM

## 2017-07-07 DIAGNOSIS — Z99.2 ESRD ON HEMODIALYSIS: ICD-10-CM

## 2017-07-07 DIAGNOSIS — C64.2 METASTATIC RENAL CELL CARCINOMA, LEFT: Primary | ICD-10-CM

## 2017-07-07 DIAGNOSIS — R79.89 ELEVATED LFTS: ICD-10-CM

## 2017-07-07 DIAGNOSIS — D63.1 ANEMIA IN ESRD (END-STAGE RENAL DISEASE): ICD-10-CM

## 2017-07-07 PROCEDURE — 99214 OFFICE O/P EST MOD 30 MIN: CPT | Mod: S$GLB,,, | Performed by: INTERNAL MEDICINE

## 2017-07-07 PROCEDURE — 99999 PR PBB SHADOW E&M-EST. PATIENT-LVL III: CPT | Mod: PBBFAC,,, | Performed by: INTERNAL MEDICINE

## 2017-07-21 ENCOUNTER — OFFICE VISIT (OUTPATIENT)
Dept: CARDIOLOGY | Facility: CLINIC | Age: 52
End: 2017-07-21
Payer: COMMERCIAL

## 2017-07-21 VITALS
BODY MASS INDEX: 22.89 KG/M2 | OXYGEN SATURATION: 97 % | WEIGHT: 134.06 LBS | HEART RATE: 80 BPM | DIASTOLIC BLOOD PRESSURE: 80 MMHG | HEIGHT: 64 IN | SYSTOLIC BLOOD PRESSURE: 150 MMHG

## 2017-07-21 DIAGNOSIS — Z90.5 S/P NEPHRECTOMY: Chronic | ICD-10-CM

## 2017-07-21 DIAGNOSIS — N18.6 ESRD ON HEMODIALYSIS: ICD-10-CM

## 2017-07-21 DIAGNOSIS — I27.20 PULMONARY HYPERTENSION: ICD-10-CM

## 2017-07-21 DIAGNOSIS — Z99.2 ESRD ON HEMODIALYSIS: ICD-10-CM

## 2017-07-21 DIAGNOSIS — R73.03 PREDIABETES: ICD-10-CM

## 2017-07-21 DIAGNOSIS — I10 ESSENTIAL HYPERTENSION: Chronic | ICD-10-CM

## 2017-07-21 DIAGNOSIS — I42.9 CARDIOMYOPATHY, UNSPECIFIED TYPE: ICD-10-CM

## 2017-07-21 DIAGNOSIS — R29.818 SUSPECTED SLEEP APNEA: ICD-10-CM

## 2017-07-21 DIAGNOSIS — I51.89 COMBINED SYSTOLIC AND DIASTOLIC CARDIAC DYSFUNCTION: Primary | ICD-10-CM

## 2017-07-21 PROBLEM — I25.5 ISCHEMIC CARDIOMYOPATHY: Status: ACTIVE | Noted: 2017-07-21

## 2017-07-21 PROCEDURE — 99215 OFFICE O/P EST HI 40 MIN: CPT | Mod: NTX,S$GLB,, | Performed by: NURSE PRACTITIONER

## 2017-07-21 PROCEDURE — 99999 PR PBB SHADOW E&M-EST. PATIENT-LVL III: CPT | Mod: PBBFAC,TXP,, | Performed by: NURSE PRACTITIONER

## 2017-07-21 RX ORDER — METOPROLOL SUCCINATE 50 MG/1
50 TABLET, EXTENDED RELEASE ORAL DAILY
Qty: 30 TABLET | Refills: 11 | Status: SHIPPED | OUTPATIENT
Start: 2017-07-21 | End: 2017-08-28 | Stop reason: SDUPTHER

## 2017-07-21 NOTE — PROGRESS NOTES
Ms. Bloom is a patient of Dr. Cole and was last seen in [facility] 5/10/2017.    Subjective:   Patient ID:  Eva Bloom is a 51 y.o. female who presents for follow-up of Cardiomyopathy    Problems:  HTN  Glucose intolerance  ESRD on hemodialysis  Combined systolic and diastolic dysfunction, EF 35%  Pulmonary HTN, PA pressure 54  Moderate to severe MR  Moderate TR  Recurrent renal cell carcinoma, s/p nephrectomy  Small pericardial effusion 6/2017  Non-ischemic cardiomyopathy    HPI  Ms. Bloom is in clinic today to follow up after her PET stress test.  She was started on metoprolol succinate 25mg daily, hydralazine 100mg BID, and isosorbide mononitrate 30mg daily 2 months ago by Dr. Cole.  She reports an improvement in her SOB and WARREN since having her new dialysis dry weight of 59kg and they are taking off more fluid.  She is not anuric and is taking lasix 40mg as needed for fluid retention.  She weighs herself daily.  Patient denies chest pain with exertion or at rest, palpitations, SOB, WARREN, dizziness, syncope, edema, orthopnea, PND, or claudication.  Reports routine exercise, walking 10 minutes a day.  Hypertension is treated with an ARB, beta blocker, and hydralazine.  Reports following a low salt diet. Home blood pressure readings are 150s systolic.  Reports daytime somnolence, fatigue, and napping.  She is unsure if she snores.   She is taking cabozantinib for renal carcinoma treatment and it has a known side effect of QT prolongation.     Under ACC guidelines, this patient's 10 year risk for atherosclerotic cardiovascular disease (ASCVD) is The 10-year ASCVD risk score (Leon SHELLEY Jr., et al., 2013) is: 7.5%    Values used to calculate the score:      Age: 51 years      Sex: Female      Is Non- : Yes      Diabetic: No      Tobacco smoker: No      Systolic Blood Pressure: 150 mmHg      Is BP treated: Yes      HDL Cholesterol: 43 mg/dL      Total Cholesterol: 151 mg/dL      Review of Systems   Constitution: Negative for decreased appetite, diaphoresis, weakness, malaise/fatigue, weight gain and weight loss.   HENT: Negative for headaches.    Eyes: Negative for visual disturbance.   Cardiovascular: Negative for chest pain, claudication, dyspnea on exertion, irregular heartbeat, leg swelling, near-syncope, orthopnea, palpitations, paroxysmal nocturnal dyspnea and syncope.        Denies chest pressure   Respiratory: Negative for cough, hemoptysis, shortness of breath, sleep disturbances due to breathing and snoring.    Endocrine: Negative for cold intolerance and heat intolerance.   Hematologic/Lymphatic: Negative for bleeding problem. Does not bruise/bleed easily.   Musculoskeletal: Negative for myalgias.   Gastrointestinal: Negative for bloating, abdominal pain, anorexia, change in bowel habit, constipation, diarrhea, nausea and vomiting.   Neurological: Negative for difficulty with concentration, disturbances in coordination, excessive daytime sleepiness, dizziness, light-headedness, loss of balance and numbness.   Psychiatric/Behavioral: The patient does not have insomnia.      Allergies and current medications updated and reviewed:  Review of patient's allergies indicates:   Allergen Reactions    Coreg [carvedilol] Other (See Comments)     Nausea/vomiting    Allopurinol      Other reaction(s): abnormal transaminases     Current Outpatient Prescriptions   Medication Sig    acetaminophen (TYLENOL) 500 MG tablet Take 500 mg by mouth every 6 (six) hours as needed for Pain.    aluminum-magnesium hydroxide-simethicone (MAALOX) 200-200-20 mg/5 mL Susp Take 30 mLs by mouth every 6 (six) hours as needed.     cabozantinib 60 mg Tab Take 60 mg by mouth once daily.    febuxostat (ULORIC) 40 mg Tab Take 1 tablet (40 mg total) by mouth once daily.    furosemide (LASIX) 40 MG tablet Take 1 tablet (40 mg total) by mouth daily as needed (weight gain of 3 lbs in 24 hrs or 5 lbs in 1 week).  "   hydrALAZINE (APRESOLINE) 50 MG tablet Take 1 tablet (50 mg total) by mouth every 12 (twelve) hours.    isosorbide mononitrate (IMDUR) 30 MG 24 hr tablet Take 1 tablet (30 mg total) by mouth once daily.    losartan (COZAAR) 100 MG tablet Take 1 tablet (100 mg total) by mouth once daily.    metoprolol succinate (TOPROL-XL) 25 MG 24 hr tablet Take 1 tablet (25 mg total) by mouth once daily.    multivitamin-Ca-iron-minerals (ONE-A-DAY WOMENS FORMULA) 27-0.4 mg Tab Take by mouth. 1 Tablet Oral Every day    ondansetron (ZOFRAN-ODT) 8 MG TbDL Take 1 tablet (8 mg total) by mouth every 12 (twelve) hours as needed.    psyllium husk-calcium (METAMUCIL PLUS CALCIUM) 1-60 gram-mg Cap Take by mouth. 2 Capsule Oral Every morning    sevelamer HCl (RENAGEL) 800 MG Tab Take 2 tablets (1,600 mg total) by mouth 3 (three) times daily with meals.    trazodone (DESYREL) 50 MG tablet Take 1 tablet (50 mg total) by mouth every evening.     No current facility-administered medications for this visit.      Objective:     Right Arm BP - Sittin/80 (17 0810)    BP (!) 150/80 (BP Location: Left arm, Patient Position: Sitting, BP Method: Manual)   Pulse 80   Ht 5' 4" (1.626 m)   Wt 60.8 kg (134 lb 0.6 oz)   LMP 10/24/2016   SpO2 97%   BMI 23.01 kg/m²     Physical Exam   Constitutional: She is oriented to person, place, and time. Vital signs are normal. She appears well-developed and well-nourished. She is active. No distress.   HENT:   Head: Normocephalic and atraumatic.   Eyes: Conjunctivae and lids are normal. No scleral icterus.   Neck: Neck supple. JVD present. Normal carotid pulses and no hepatojugular reflux present. Carotid bruit is present (Radiates from AV fistula into left carotid artery).   Cardiovascular: Regular rhythm, S1 normal, S2 normal and intact distal pulses.  Bradycardia present.  PMI is not displaced.  Exam reveals no gallop and no friction rub.    Murmur heard.  Pulses:       Carotid pulses are " 2+ on the right side, and 2+ on the left side.       Radial pulses are 2+ on the right side, and 2+ on the left side.        Dorsalis pedis pulses are 2+ on the right side, and 2+ on the left side.        Posterior tibial pulses are 1+ on the right side, and 1+ on the left side.   AV fistula in left bicep has audible bruit throughout chest and radiating into left carotid artery   Pulmonary/Chest: Effort normal and breath sounds normal. No respiratory distress. She has no decreased breath sounds. She has no wheezes. She has no rhonchi. She has no rales. She exhibits no tenderness.   Abdominal: Soft. Normal appearance and bowel sounds are normal. She exhibits no distension, no fluid wave, no abdominal bruit, no ascites and no pulsatile midline mass. There is no hepatosplenomegaly. There is no tenderness.   Musculoskeletal: She exhibits edema (trace ble).   Neurological: She is alert and oriented to person, place, and time. Gait normal.   Skin: Skin is warm, dry and intact. No rash noted. She is not diaphoretic. Nails show no clubbing.   Psychiatric: She has a normal mood and affect. Her speech is normal and behavior is normal. Judgment and thought content normal. Cognition and memory are normal.   Nursing note and vitals reviewed.      Chemistry        Component Value Date/Time     06/28/2017 1015    K 4.3 06/28/2017 1015    CL 98 06/28/2017 1015    CO2 35 (H) 06/28/2017 1015    BUN 39 (H) 06/28/2017 1015    CREATININE 7.7 (H) 06/28/2017 1015     (H) 06/28/2017 1015        Component Value Date/Time    CALCIUM 9.4 06/28/2017 1015    ALKPHOS 85 06/28/2017 1015    AST 30 06/28/2017 1015    ALT 46 (H) 06/28/2017 1015    BILITOT 1.2 (H) 06/28/2017 1015    ESTGFRAFRICA 6.3 (A) 06/28/2017 1015    EGFRNONAA 5.5 (A) 06/28/2017 1015        Lab Results   Component Value Date    HGBA1C 5.1 09/08/2016       Recent Labs  Lab 06/04/16  1116  09/08/16  0810  04/05/17  1020 04/06/17  0515  06/28/17  1015   WHITE BLOOD  CELL COUNT 10.38  < >  --   < > 4.76 6.53  < > 4.52   HEMOGLOBIN 14.6  < >  --   < > 12.8 12.4  < > 10.6 L   HEMATOCRIT 43.0  < >  --   < > 41.9 41.2  < > 33.3 L   MCV 83  < >  --   < > 91 90  < > 97   PLATELETS 485 H  < >  --   < > 214 224  < > 152   BNP <10  --   --   --  >4,900 H  --   --   --    TSH  --   --   --   --   --  1.401  --   --    CHOLESTEROL  --   --  151  --   --   --   --   --    HDL  --   --  43  --   --   --   --   --    LDL CHOLESTEROL  --   --  90.4  --   --   --   --   --    TRIGLYCERIDES  --   --  88  --   --   --   --   --    HDL/CHOLESTEROL RATIO  --   --  28.5  --   --   --   --   --    < > = values in this interval not displayed.      Recent Labs  Lab 09/23/15  0711 04/05/17  1020   INR 0.9 1.1        Test(s) Reviewed  I have reviewed the following in detail:  [x] Stress test   [] Angiography   [x] Echocardiogram   [x] Labs   [] Other:       Assessment:     1. Essential hypertension  Not well controlled   2. ESRD on hemodialysis     3. Non-ischemic cardiomyopathy  She is taking a beta blocker, metoprolol succinate 25mg daily.    4. Combined systolic and diastolic cardiac dysfunction  Asymptomatic. Reports significant improvement in breathing with new dialysis dry weight of 59kg. EJ distention maybe in part d/t tricuspid regurg.    5. Pulmonary hypertension     6. S/p nephrectomy left     7. Prediabetes     8 Suspected sleep apnea Multiple sx associated with sleep apnea      Be cautious with use of any other medications that can prolong the QT interval as her chemotherapeutic agent has caused a prolongation, QTc went from 457 to 490  Plan:     Combined systolic and diastolic cardiac dysfunction  Comments:  Increase dose of beta blocker with a goal titration of max tolerated or 150mg metoprolol daily  Orders:  -     metoprolol succinate (TOPROL-XL) 50 MG 24 hr tablet; Take 1 tablet (50 mg total) by mouth once daily.  Dispense: 30 tablet; Refill: 11  -     2D Echo w/ Color Flow Doppler;  Future    Essential hypertension  Comments:  2gm sodium diet.   Orders:  -     metoprolol succinate (TOPROL-XL) 50 MG 24 hr tablet; Take 1 tablet (50 mg total) by mouth once daily.  Dispense: 30 tablet; Refill: 11    ESRD on hemodialysis    Cardiomyopathy, unspecified type  Comments:  Repeat echo to re-assess after decreased volume status    Pulmonary hypertension  -     2D Echo w/ Color Flow Doppler; Future    S/p nephrectomy left    Prediabetes    Suspected sleep apnea  Comments:  Discussed sleep apnea with patient and she declined referral to sleep clinic.       Return in about 3 months (around 10/21/2017).

## 2017-07-21 NOTE — PATIENT INSTRUCTIONS
1. Restrict salt to no more than 2,000mg a day  -No more than 100mg of salt in a snack  -No more than 250mg of salt in an entree  -No more than 500mg of salt in an entire meal    2. Increase cardiovascular exercise to 30 minutes of brisk walking a day for 4-5 days a week.  You can use a stationary bike or swim for 30 minutes a day instead of walking.  Whatever exercise you choose, make sure you are working hard enough to increase your heart rate.     3. Weigh yourself daily and take your lasix (furosemide) if you gain 3 pounds in a day or 5 pounds in a week until the weight has returned to baseline.    4. Increase metoprolol succinate to 50mg a day.  You can take 2 of the 25mg tablets daily until you run out and then start taking 1 tablet of the 50mg metoprolol succinate.    5. Please schedule an echo and return to clinic in 3 months.   Coping with Heart Failure  Its normal to feel sad or down at times when youre living with heart failure. Some medicines can also affect your mood. Following your treatment plan may seem difficult at times. If you feel overwhelmed, just focus on one day at a time. Dont be afraid to ask others for help when you need it.    Ways to feel better  Try not to withdraw from family and friends, even if you are finding it hard to talk to them. They can still be a good source of support. To feel better, you can also:  · Spend time doing things you enjoy. This may include participating in a favorite hobby, meditating, praying, or spending time with people you care about. Find activities that make you happy and make those a priority.  · Share what you learn about heart failure with the people in your life. Invite family members along when you visit your healthcare provider. This will help you feel supported as well as help you discuss the care plan you've agreed upon with your doctor.  · Think about joining a support group for people with heart failure. It may be easier to talk to people who  know firsthand what youre going through. They can offer advice and share stories. You may want to ask loved ones to join you for a meeting.  Asking for help  Having heart failure doesnt mean that you have to feel bad all the time. Consider talking to your healthcare provider or a therapist if:  · You feel worthless or helpless, or are thinking about suicide. These are warning signs of depression. Treatment can help you feel better. When depression is under control, your overall health may also improve.  · You feel anxious about what will happen to your loved ones if your health gets worse. Taking care of legal arrangements, such as a living will and durable power of , can help you feel more secure about the future.  · Social support helps alleviate stress and helps your stick with your healthy lifestyle changes. Without social support, you may end up back in the hospital.  Date Last Reviewed: 3/20/2016  © 2556-9622 [employer]. [address] [place] [address]. All rights reserved. This information is not intended as a substitute for professional medical care. Always follow your healthcare professional's instructions.        Heart Failure: Making Changes to Your Diet  You have a condition called heart failure. When you have heart failure, excess fluid is more likely to build up in your body because your heart isn't working well. This makes the heart work harder to pump blood. Fluid buildup causes symptoms such as shortness of breath and swelling (edema). This is often referred to as congestive heart failure or CHF. Controlling the amount of salt (sodium) you eat may help stop fluid from building up. Your doctor may also tell you to reduce the amount of fluid you drink.  Reading food labels    Your healthcare provider will tell you how much sodium you can eat each day. Read food labels to keep track. Keep in mind that certain foods are high in salt. These include canned, frozen, and  processed foods. Check the amount of sodium in each serving. Watch out for high-sodium ingredients. These include MSG (monosodium glutamate), baking soda, and sodium phosphate.   Eating less salt  Give yourself time to get used to eating less salt. It may take a little while. Here are some tips to help:  · Take the saltshaker off the table. Replace it with salt-free herb mixes and spices.  · Eat fresh or plain frozen vegetables. These have much less salt than canned vegetables.  · Choose low-sodium snacks like sodium-free pretzels, crackers, or air-popped popcorn.  · Dont add salt to your food when youre cooking. Instead, season your foods with pepper, lemon, garlic, or onion.  · When you eat out, ask that your food be cooked without added salt.  · Avoid eating fried foods as these often have a great deal of salt.  If youre told to limit fluids  You may need to limit how much fluid you have to help prevent swelling. This includes anything that is liquid at room temperature, such as ice cream and soup. If your doctor tells you to limit fluid, try these tips:  · Measure drinks in a measuring cup before you drink them. This will help you meet daily goals.  · Chill drinks to make them more refreshing.  · Suck on frozen lemon wedges to quench thirst.  · Only drink when youre thirsty.  · Chew sugarless gum or suck on hard candy to keep your mouth moist.  · Weigh yourself daily to know if your body's fluid content is rising.  My sodium goal  Your healthcare provider may give you a sodium goal to meet each day. This includes sodium found in food as well as salt that you add. My goal is to eat no more than ___________ mg of sodium per day.     When to call your doctor  Call your doctor right away if you have any symptoms of worsening heart failure. These can include:  · Sudden weight gain  · Increased swelling of your legs or ankles  · Trouble breathing when youre resting or at night  · Increase in the number of pillows  you have to sleep on  · Chest pain, pressure, discomfort, or pain in the jaw, neck, or back   Date Last Reviewed: 3/21/2016  © 0313-9388 [employer]. [address] [place] [address], [place] [address]. All rights reserved. This information is not intended as a substitute for professional medical care. Always follow your healthcare professional's instructions.        Heart Failure: Being Active  You have a condition called heart failure. Being active doesnt mean that you have to wear yourself out. Even a little movement each day helps to strengthen your heart. If you cant get out to exercise, you can do simple stretching and strengthening exercises at home. These are good ways to keep you well-conditioned and prevent you and your heart from becoming excessively weak.    Ideas to get you started  · Add a little movement to things you do now. Walk to mail letters. Park your car at the far end of the parking lot and walk to the store. Walk up a flight of stairs instead of taking the elevator.  · Choose activities you enjoy. You might walk, swim, or ride an exercise bike. Things like gardening and washing the car count, too. Other possibilities include: washing dishes, walking the dog, walking around the mall, and doing aerobic activities with friends.  · Join a group exercise program at a [facility] or [facility], a senior center, or a community center. Or look into a hospital cardiac rehabilitation program. Ask your doctor if you qualify.  Tips to keep you going  · Get up and get dressed each day. Go to a coffee shop and read a newspaper or go somewhere that you'll be in the presence of other active people. Youll feel more like being active.  · Make a plan. Choose one or more activities that you enjoy and that you can easily do. Then plan to do at least one each day. You might write your plan on a calendar.  · Go with a friend or a group if you like company. This can help you feel supported and stay motivated, too.  · Plan  social events that you enjoy. This will keep you mentally engaged as well as physically motivated to do things you find pleasure in.  For your safety  · Talk with your healthcare provider before starting an exercise program.  · Exercise indoors when its too hot or too cold outside, or when the air quality is poor. Try walking at a shopping mall.  · Wear socks and sturdy shoes to maintain your balance and prevent falls.  · Start slowly. Do a few minutes several times a day at first. Increase your time and speed little by little.  · Stop and rest whenever you feel tired or get short of breath.  · Dont push yourself on days when you dont feel well.  Date Last Reviewed: 3/20/2016  © 4108-8256 [email or web address]. [address] [place] [address], [place] [address]. All rights reserved. This information is not intended as a substitute for professional medical care. Always follow your healthcare professional's instructions.

## 2017-07-28 ENCOUNTER — OFFICE VISIT (OUTPATIENT)
Dept: VASCULAR SURGERY | Facility: CLINIC | Age: 52
End: 2017-07-28
Payer: COMMERCIAL

## 2017-07-28 ENCOUNTER — HOSPITAL ENCOUNTER (OUTPATIENT)
Dept: VASCULAR SURGERY | Facility: CLINIC | Age: 52
Discharge: HOME | End: 2017-07-28
Attending: SURGERY
Payer: COMMERCIAL

## 2017-07-28 VITALS
HEIGHT: 64 IN | WEIGHT: 136 LBS | DIASTOLIC BLOOD PRESSURE: 95 MMHG | SYSTOLIC BLOOD PRESSURE: 166 MMHG | TEMPERATURE: 98 F | HEART RATE: 91 BPM | BODY MASS INDEX: 23.22 KG/M2

## 2017-07-28 DIAGNOSIS — Z99.2 ESRD (END STAGE RENAL DISEASE) ON DIALYSIS: Primary | ICD-10-CM

## 2017-07-28 DIAGNOSIS — Z99.2 ESRD (END STAGE RENAL DISEASE) ON DIALYSIS: ICD-10-CM

## 2017-07-28 DIAGNOSIS — N18.6 ESRD (END STAGE RENAL DISEASE) ON DIALYSIS: Primary | ICD-10-CM

## 2017-07-28 DIAGNOSIS — N18.6 ESRD (END STAGE RENAL DISEASE) ON DIALYSIS: ICD-10-CM

## 2017-07-28 PROCEDURE — 99999 PR PBB SHADOW E&M-EST. PATIENT-LVL III: CPT | Mod: PBBFAC,TXP,, | Performed by: SURGERY

## 2017-07-28 PROCEDURE — 99213 OFFICE O/P EST LOW 20 MIN: CPT | Mod: S$GLB,TXP,, | Performed by: SURGERY

## 2017-07-28 PROCEDURE — 93990 DOPPLER FLOW TESTING: CPT | Mod: S$GLB,TXP,, | Performed by: SURGERY

## 2017-07-28 NOTE — PROGRESS NOTES
Eva Bloom 51 y.o. with ESRD on HD who is status post:    1.  Covered stent placement, left AV fistula outflow tract, 03/06/2017.  2.  Left upper arm 4 to 7 tapered AV graft placement, 01/01/2017.    She now returns with no complaints. She dialyzes T,H,S at [facility] on [address]. No issues with dialysis with access, flow, or prolonged bleeding.  She holds pressure following deaccess for about 3-4 minutes.     Historical note:   Of note, at the time of surgery, she was found to have an exceptionally thin and friable 4 mm brachial vein, which was used as the outflow.  When she returned with a stenosis above this anastomosis, this was treated empirically with a covered stent because of risk of rupture.    Past Medical History:   Diagnosis Date    Anemia     Bronchitis 03/01/2017    Cancer 2016    kidney cancer    CMV (cytomegalovirus) antibody positive     Essential hypertension 9/23/2015    H/O herpes simplex type 2 infection     Herpes simplex type 1 antibody positive     History of kidney cancer     s/p left nephrectomy 1/2016    Hyperparathyroidism, unspecified     Hyperuricemia without signs of inflammatory arthritis and tophaceous disease     Kidney stones     LGSIL (low grade squamous intraepithelial dysplasia)     Prediabetes     Proteinuria     Renal disorder     Thyroid nodule     Urate nephropathy        Past Surgical History:   Procedure Laterality Date    BREAST SURGERY      LUMPECTOMY    COLONOSCOPY N/A 11/12/2015    NEPHRECTOMY-LAPAROSCOPIC Left 01/12/2016    PERITONEAL CATHETER INSERTION      Permacath insertion  01/12/2017    SALPINGOOPHORECTOMY Right 2016    KJB---DAVINCI    TONSILLECTOMY      TUBAL LIGATION         Family History   Problem Relation Age of Onset    Hypertension Mother     Diabetes Father     Kidney disease Father     Diabetes Maternal Grandfather     No Known Problems Sister     No Known Problems Brother     Heart disease Sister     No Known Problems Sister      No Known Problems Brother     Breast cancer Neg Hx     Colon cancer Neg Hx     Cancer Neg Hx     Stroke Neg Hx        Social History     Social History    Marital status:      Spouse name: N/A    Number of children: 2    Years of education: N/A     Occupational History    [employer] #911     Social History Main Topics    Smoking status: Never Smoker    Smokeless tobacco: Never Used    Alcohol use No      Comment: . 2 children. works at [employer].    Drug use: No    Sexual activity: Yes     Partners: Male     Other Topics Concern    Not on file     Social History Narrative    No narrative on file       Current Outpatient Prescriptions   Medication Sig Dispense Refill    acetaminophen (TYLENOL) 500 MG tablet Take 500 mg by mouth every 6 (six) hours as needed for Pain.      aluminum-magnesium hydroxide-simethicone (MAALOX) 200-200-20 mg/5 mL Susp Take 30 mLs by mouth every 6 (six) hours as needed.       cabozantinib 60 mg Tab Take 60 mg by mouth once daily. 30 tablet 5    febuxostat (ULORIC) 40 mg Tab Take 1 tablet (40 mg total) by mouth once daily. 30 tablet 4    furosemide (LASIX) 40 MG tablet Take 1 tablet (40 mg total) by mouth daily as needed (weight gain of 3 lbs in 24 hrs or 5 lbs in 1 week). 30 tablet 11    hydrALAZINE (APRESOLINE) 50 MG tablet Take 1 tablet (50 mg total) by mouth every 12 (twelve) hours. 60 tablet 11    isosorbide mononitrate (IMDUR) 30 MG 24 hr tablet Take 1 tablet (30 mg total) by mouth once daily. 30 tablet 11    metoprolol succinate (TOPROL-XL) 50 MG 24 hr tablet Take 1 tablet (50 mg total) by mouth once daily. 30 tablet 11    multivitamin-Ca-iron-minerals (ONE-A-DAY WOMENS FORMULA) 27-0.4 mg Tab Take by mouth. 1 Tablet Oral Every day      ondansetron (ZOFRAN-ODT) 8 MG TbDL Take 1 tablet (8 mg total) by mouth every 12 (twelve) hours as needed. 30 tablet 1    psyllium husk-calcium (METAMUCIL PLUS CALCIUM) 1-60 gram-mg Cap Take by mouth. 2  Capsule Oral Every morning      sevelamer HCl (RENAGEL) 800 MG Tab Take 2 tablets (1,600 mg total) by mouth 3 (three) times daily with meals. 180 tablet 11    trazodone (DESYREL) 50 MG tablet Take 1 tablet (50 mg total) by mouth every evening. 30 tablet 11    losartan (COZAAR) 100 MG tablet Take 1 tablet (100 mg total) by mouth once daily. 30 tablet 5     No current facility-administered medications for this visit.        PHYSICAL EXAMINATION:  VITAL SIGNS:    Vitals:    07/28/17 0901   BP: (!) 166/95   Pulse: 91   Temp: 97.7 °F (36.5 °C)     NEURO: AAO x 3  EXTREMITIES:  Left arm shows a nice thrill with no pulsatility whatsoever in the upper arm prosthetic graft.      Bilateral radial pulses 2+     IMAGING:  Duplex of the graft shows no stenosis whatsoever and a flow volume of 2.5 L (prior 2.3 L).  Venous anastomotic and outflow velocity 544  compared to 318 prior.    MEDICATIONS:  No anticoagulants or antiplatelet agents.    1. ESRD (end stage renal disease) on dialysis  [facility]         ASSESSMENT:  51 y.o. with ESRD with well working left arm AVG without indications of stenosis.      PLAN:  1.  Follow up in 3 months with a surveillance duplex, sooner if clinically indicated.      ALYCE Chisholm, APRN, FNP-BC  Nurse Practitioner  Vascular and Endovascular Surgery      VASCULAR STAFF    I have personally taken the history and examined this patient and agree with the resident's note as stated above    Great thrill, minimal pulsatility, no clinical issues.  Will observe this moderate outflow stenosis    MANPREET Muñoz III, MD, FACS  Professor and Chief, Vascular and Endovascular Surgery

## 2017-07-31 DIAGNOSIS — Z76.82 ORGAN TRANSPLANT CANDIDATE: ICD-10-CM

## 2017-08-10 DIAGNOSIS — I10 ESSENTIAL HYPERTENSION: Primary | ICD-10-CM

## 2017-08-10 RX ORDER — HYDRALAZINE HYDROCHLORIDE 100 MG/1
100 TABLET, FILM COATED ORAL EVERY 12 HOURS
Qty: 90 TABLET | Refills: 3 | Status: SHIPPED | OUTPATIENT
Start: 2017-08-10 | End: 2017-12-18 | Stop reason: SDUPTHER

## 2017-08-17 NOTE — PROGRESS NOTES
"Subjective:       Patient ID: Eva Bloom is a 51 y.o. female.    Chief Complaint: Metastatic renal cell carcinoma, left    HPI   51 year old female to clinic for 6 week follow up and to review restaging scans for recurrent metastatic renal cell carcinoma. Pt is currently taking cabozantinib daily. Pt is on ESRD with HD every TTS. Patient tolerating treat extremely well and has no complaints today.    Today, she denies any mouth sores, nausea, vomiting, diarrhea, constipation,weight loss, chest pain, leg swelling, headache, dizziness, or mood changes.    ECOG 1. She is accompanied to clinic alone.    Oncologic History (From Chart and Patient):  Eva Bloom is a 51 y.o. female with ESRD on HD who was found to have two L renal masses. She underwent L lap nephrectomy on 1/12/16. 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 Margins were negative.  Shehealed from surgery well, but then developed a large ovarian mass.  This was removed by gyn along with multiple sites of metastatic disease in the pelvis.  Path was c/w recurrence of RCC.     11/20/16 Pelvic ultrasound reveals "Large heterogeneous cystic and solid mass which appears to arise from the right ovary with worrisome imaging features for malignancy.  Differential diagnosis includes malignant tumors such as serous or mucinous cystadenocarcinoma.  It is important to note that the patient is at risk for ovarian torsion due to the size of the mass.Multiple uterine fibroids are identified with the largest in the uterine fundus."    11/22/16 pathology reveals "FINAL PATHOLOGIC DIAGNOSIS-RIGHT OVARY AND FALLOPIAN TUBE, RIGHT SALPINGO-OOPHORECTOMY:  -Positive for malignancy, high grade carcinoma morphologically and immunohistochemically consistent with metastasis from the patient's known renal cell carcinoma"    1/23/17 MRI abdomen reveals " ""Status post left nephrectomy, the left nephrectomy bed appears normal.There is a small liver lesion and a small splenic lesion, they cannot be fully characterized but they are unchanged from 5/27/16 suggestive of benign lesions.Multiple right kidney cysts.Cholelithiasis.Soft tissue oval density noted within the mesentery adjacent to the aorta right iliac bifurcation, nonspecific, could represent an enlarged lymph node, it is unchanged from prior studies"    1/23/17- CT Chest reveals "Stable pulmonary nodules, likely benign. No new pulmonary parenchymal abnormalities are identified. Enlarging nodular foci in partially visualized left upper abdomen. Peritoneal implants not excluded. Multiple thyroid nodules, measuring up to 1.6 cm."    4/5/17-MRI abdomen reveals "Again seen are the T2 hyperintense hepatic and splenic lesions that appear similar to prior exam and possibly represents hemangiomas. There are right renal cysts and the patient is status post left nephrectomy."    7/3/17- MRI abdomen "Left nephrectomy.  Right renal cysts with no malignant features.Liver and splenic lesions consistent with cysts versus hemangiomas.Cholelithiasis.Right pleural effusion and right basal atelectasis.Small pericardial effusion.Body wall edema and mesenteric edema."  7/3/17- CT chest "In this patient with a history of left renal cell carcinoma, there are 2 right lung nodules which are stable compared to January 2017.  2 left lung nodules seen on that study are not well-seen today, may be related to a small amount of pleural fluid.Bilateral pleural effusions, mildly decreased compared to prior study.Small amount of pericardial fluid, new."    Review of Systems   Constitutional: Positive for appetite change and fatigue. Negative for activity change, chills and fever.   HENT: Negative for congestion, hearing loss, mouth sores, sore throat, tinnitus and voice change.    Eyes: Negative for pain and visual disturbance.   Respiratory: " Positive for shortness of breath. Negative for cough and wheezing.    Cardiovascular: Negative for chest pain, palpitations and leg swelling.   Gastrointestinal: Negative for abdominal pain, constipation, diarrhea, nausea and vomiting.   Endocrine: Negative for cold intolerance and heat intolerance.   Genitourinary: Negative for difficulty urinating, dyspareunia, dysuria, frequency, menstrual problem, urgency, vaginal bleeding, vaginal discharge and vaginal pain.   Musculoskeletal: Negative for arthralgias and myalgias.   Skin: Negative for color change, rash and wound.   Allergic/Immunologic: Negative for environmental allergies and food allergies.   Neurological: Negative for weakness, numbness and headaches.   Hematological: Negative for adenopathy. Does not bruise/bleed easily.   Psychiatric/Behavioral: Negative for agitation, confusion, hallucinations and sleep disturbance. The patient is not nervous/anxious.    All other systems reviewed and are negative.      Allergies:  Review of patient's allergies indicates:   Allergen Reactions    Allopurinol      Other reaction(s): abnormal transaminases       Medications:  Current Outpatient Prescriptions   Medication Sig Dispense Refill    acetaminophen (TYLENOL) 500 MG tablet Take 500 mg by mouth every 6 (six) hours as needed for Pain.      aluminum-magnesium hydroxide-simethicone (MAALOX) 200-200-20 mg/5 mL Susp Take 30 mLs by mouth every 6 (six) hours as needed.       cabozantinib 60 mg Tab Take 60 mg by mouth once daily. 30 tablet 5    febuxostat (ULORIC) 40 mg Tab Take 1 tablet (40 mg total) by mouth once daily. 30 tablet 4    furosemide (LASIX) 40 MG tablet Take 1 tablet (40 mg total) by mouth daily as needed (weight gain of 3 lbs in 24 hrs or 5 lbs in 1 week). 30 tablet 11    hydrALAZINE (APRESOLINE) 100 MG tablet Take 1 tablet (100 mg total) by mouth every 12 (twelve) hours. 90 tablet 3    isosorbide mononitrate (IMDUR) 30 MG 24 hr tablet Take 1 tablet  (30 mg total) by mouth once daily. 30 tablet 11    losartan (COZAAR) 100 MG tablet Take 1 tablet (100 mg total) by mouth once daily. 30 tablet 5    metoprolol succinate (TOPROL-XL) 50 MG 24 hr tablet Take 1 tablet (50 mg total) by mouth once daily. 30 tablet 11    multivitamin-Ca-iron-minerals (ONE-A-DAY WOMENS FORMULA) 27-0.4 mg Tab Take by mouth. 1 Tablet Oral Every day      ondansetron (ZOFRAN-ODT) 8 MG TbDL Take 1 tablet (8 mg total) by mouth every 12 (twelve) hours as needed. 30 tablet 1    psyllium husk-calcium (METAMUCIL PLUS CALCIUM) 1-60 gram-mg Cap Take by mouth. 2 Capsule Oral Every morning      sevelamer HCl (RENAGEL) 800 MG Tab Take 2 tablets (1,600 mg total) by mouth 3 (three) times daily with meals. 180 tablet 11    trazodone (DESYREL) 50 MG tablet Take 1 tablet (50 mg total) by mouth every evening. 30 tablet 11     No current facility-administered medications for this visit.        PMH:  Past Medical History:   Diagnosis Date    Anemia     Bronchitis 03/01/2017    Cancer 2016    kidney cancer    CMV (cytomegalovirus) antibody positive     Essential hypertension 9/23/2015    H/O herpes simplex type 2 infection     Herpes simplex type 1 antibody positive     History of kidney cancer     s/p left nephrectomy 1/2016    Hyperparathyroidism, unspecified     Hyperuricemia without signs of inflammatory arthritis and tophaceous disease     Kidney stones     LGSIL (low grade squamous intraepithelial dysplasia)     Prediabetes     Proteinuria     Renal disorder     Thyroid nodule     Urate nephropathy        PSH:  Past Surgical History:   Procedure Laterality Date    BREAST SURGERY      LUMPECTOMY    COLONOSCOPY N/A 11/12/2015    NEPHRECTOMY-LAPAROSCOPIC Left 01/12/2016    PERITONEAL CATHETER INSERTION      Permacath insertion  01/12/2017    SALPINGOOPHORECTOMY Right 2016    [facility]---DAVINCI    TONSILLECTOMY      TUBAL LIGATION         FamHx:  Family History   Problem Relation Age  of Onset    Hypertension Mother     Diabetes Father     Kidney disease Father     Diabetes Maternal Grandfather     No Known Problems Sister     No Known Problems Brother     Heart disease Sister     No Known Problems Sister     No Known Problems Brother     Breast cancer Neg Hx     Colon cancer Neg Hx     Cancer Neg Hx     Stroke Neg Hx        SocHx:  Social History     Social History    Marital status:      Spouse name: N/A    Number of children: 2    Years of education: N/A     Occupational History    [employer]     Social History Main Topics    Smoking status: Never Smoker    Smokeless tobacco: Never Used    Alcohol use No      Comment: . 2 children. works at [employer].    Drug use: No    Sexual activity: Yes     Partners: Male     Other Topics Concern    Not on file     Social History Narrative    No narrative on file         Objective:      Physical Exam   Constitutional: She is oriented to person, place, and time. She appears well-developed and well-nourished. No distress.   HENT:   Head: Normocephalic and atraumatic.   Nose: Nose normal.   Mouth/Throat: Oropharynx is clear and moist. No oropharyngeal exudate.   Eyes: Conjunctivae and EOM are normal. Pupils are equal, round, and reactive to light. Right eye exhibits no discharge. Left eye exhibits no discharge.   Neck: Normal range of motion. Neck supple. No tracheal deviation present.   Cardiovascular: Normal rate and regular rhythm.  Exam reveals no gallop and no friction rub.    Murmur heard.  Pulmonary/Chest: Effort normal and breath sounds normal. No respiratory distress. She has no wheezes. She has no rales.   Abdominal: Soft. Bowel sounds are normal. She exhibits no distension. There is no tenderness. There is no rebound and no guarding.   Genitourinary: No breast tenderness, discharge or bleeding.   Musculoskeletal: Normal range of motion. She exhibits no edema or tenderness.   Neurological: She is  alert and oriented to person, place, and time. She has normal reflexes. No cranial nerve deficit. Coordination normal.   Skin: Skin is warm and dry. No rash noted. She is not diaphoretic. No erythema. No pallor.   Psychiatric: She has a normal mood and affect. Her behavior is normal. Thought content normal.   Nursing note and vitals reviewed.      LABS:  WBC   Date Value Ref Range Status   06/28/2017 4.52 3.90 - 12.70 K/uL Final     Hemoglobin   Date Value Ref Range Status   06/28/2017 10.6 (L) 12.0 - 16.0 g/dL Final     Hematocrit   Date Value Ref Range Status   06/28/2017 33.3 (L) 37.0 - 48.5 % Final     Platelets   Date Value Ref Range Status   06/28/2017 152 150 - 350 K/uL Final       Chemistry        Component Value Date/Time     06/28/2017 1015    K 4.3 06/28/2017 1015    CL 98 06/28/2017 1015    CO2 35 (H) 06/28/2017 1015    BUN 39 (H) 06/28/2017 1015    CREATININE 7.7 (H) 06/28/2017 1015     (H) 06/28/2017 1015        Component Value Date/Time    CALCIUM 9.4 06/28/2017 1015    ALKPHOS 85 06/28/2017 1015    AST 30 06/28/2017 1015    ALT 46 (H) 06/28/2017 1015    BILITOT 1.2 (H) 06/28/2017 1015            Assessment:       1. Metastatic renal cell carcinoma, left    2. Malignant neoplasm metastatic to peritoneum    3. History of right oophorectomy    4. S/p nephrectomy left    5. Encounter for chemotherapy management    6. Anemia in ESRD (end-stage renal disease)    7. ESRD on hemodialysis    8. Hypertensive kidney disease with chronic kidney disease, stage 1-4 or unspecified chronic kidney disease    9. Abnormal echocardiogram    10. Thyroid nodule    11. Elevated LFTs        Plan:     1,2,3,4,5- Metastatic Renal Cell Carcinoma:    - It is unlikely that surgery removed all sites of metastatic disease. Given that this is c/w her previous papillary disease, pt started on oral cabozantinib 60mg daily (for dual VEGF and MET inhibition), especially given recent data that shows cabo is far superior to  student in the first line setting.  If pt progresses on this regimen, will switch to nivolumab.     Restaging scans from 7/3/17 show stable disease and pt tolerating cabozantinib relatively well. We will continue cabozantinib for now. Cytology from thoracentesis did not show malignant cells. Will plan to get restaging scans in October 2017.     RTC in 6 weeks with Ct chest, MRI abdomen, labs (CBC,CMP) and to see me and Dr. Rivera.    6- Mild, will monitor.    7,8,9- BP elevated today. Following with cardiology and PCP to manage.     10- Had thyroid US and biopsy last year, which was negative. Will monitor for now.    11- Resolved.      Patient was also seen and examined by Dr. Rivera. Patient is in agreement with the proposed treatment plan. All questions were answered to the patient's satisfaction. Pt knows to call clinic if anything is needed before the next clinic visit.    TONA Telles-RODRÍGUEZ  Hematology and Medical Oncology          I have reviewed the notes, assessments, and/or procedures performed by the housestaff, as above.  I have personally interviewed and examined the patient at the beside, and rounded with the housestaff. I concur with her/his assessment and plan and the documentation of Eva Bloom.  More than 25 mins were spent during this encounter, greater than 50% was spent in direct counseling and/or coordination of care.     Ben Rivera M.D., M.S., F.A.C.P.  Hematology/Oncology Attending  [facility]

## 2017-08-18 ENCOUNTER — LAB VISIT (OUTPATIENT)
Dept: LAB | Facility: HOSPITAL | Age: 52
End: 2017-08-18
Attending: INTERNAL MEDICINE
Payer: COMMERCIAL

## 2017-08-18 ENCOUNTER — OFFICE VISIT (OUTPATIENT)
Dept: HEMATOLOGY/ONCOLOGY | Facility: CLINIC | Age: 52
End: 2017-08-18
Payer: COMMERCIAL

## 2017-08-18 VITALS
DIASTOLIC BLOOD PRESSURE: 95 MMHG | HEART RATE: 76 BPM | WEIGHT: 135.81 LBS | BODY MASS INDEX: 23.18 KG/M2 | TEMPERATURE: 98 F | RESPIRATION RATE: 18 BRPM | HEIGHT: 64 IN | SYSTOLIC BLOOD PRESSURE: 164 MMHG

## 2017-08-18 DIAGNOSIS — Z90.721 HISTORY OF RIGHT OOPHORECTOMY: ICD-10-CM

## 2017-08-18 DIAGNOSIS — Z51.11 ENCOUNTER FOR CHEMOTHERAPY MANAGEMENT: ICD-10-CM

## 2017-08-18 DIAGNOSIS — N18.6 ESRD ON HEMODIALYSIS: ICD-10-CM

## 2017-08-18 DIAGNOSIS — D63.1 ANEMIA IN ESRD (END-STAGE RENAL DISEASE): ICD-10-CM

## 2017-08-18 DIAGNOSIS — Z99.2 ESRD ON HEMODIALYSIS: ICD-10-CM

## 2017-08-18 DIAGNOSIS — C78.6 MALIGNANT NEOPLASM METASTATIC TO PERITONEUM: ICD-10-CM

## 2017-08-18 DIAGNOSIS — E04.1 THYROID NODULE: ICD-10-CM

## 2017-08-18 DIAGNOSIS — I12.9 HYPERTENSIVE KIDNEY DISEASE WITH CHRONIC KIDNEY DISEASE, STAGE 1-4 OR UNSPECIFIED CHRONIC KIDNEY DISEASE: ICD-10-CM

## 2017-08-18 DIAGNOSIS — C64.2 METASTATIC RENAL CELL CARCINOMA, LEFT: Primary | ICD-10-CM

## 2017-08-18 DIAGNOSIS — Z90.5 S/P NEPHRECTOMY: ICD-10-CM

## 2017-08-18 DIAGNOSIS — N18.6 ANEMIA IN ESRD (END-STAGE RENAL DISEASE): ICD-10-CM

## 2017-08-18 DIAGNOSIS — R79.89 ELEVATED LFTS: ICD-10-CM

## 2017-08-18 DIAGNOSIS — R93.1 ABNORMAL ECHOCARDIOGRAM: ICD-10-CM

## 2017-08-18 DIAGNOSIS — C64.2 METASTATIC RENAL CELL CARCINOMA, LEFT: ICD-10-CM

## 2017-08-18 LAB
ALBUMIN SERPL BCP-MCNC: 2.9 G/DL
ALP SERPL-CCNC: 88 U/L
ALT SERPL W/O P-5'-P-CCNC: 41 U/L
ANION GAP SERPL CALC-SCNC: 11 MMOL/L
AST SERPL-CCNC: 36 U/L
BILIRUB SERPL-MCNC: 0.6 MG/DL
BUN SERPL-MCNC: 34 MG/DL
CALCIUM SERPL-MCNC: 8.8 MG/DL
CHLORIDE SERPL-SCNC: 104 MMOL/L
CO2 SERPL-SCNC: 26 MMOL/L
CREAT SERPL-MCNC: 7.1 MG/DL
ERYTHROCYTE [DISTWIDTH] IN BLOOD BY AUTOMATED COUNT: 15.3 %
EST. GFR  (AFRICAN AMERICAN): 7 ML/MIN/1.73 M^2
EST. GFR  (NON AFRICAN AMERICAN): 6.1 ML/MIN/1.73 M^2
GLUCOSE SERPL-MCNC: 91 MG/DL
HCT VFR BLD AUTO: 32 %
HGB BLD-MCNC: 10.6 G/DL
MCH RBC QN AUTO: 33.9 PG
MCHC RBC AUTO-ENTMCNC: 33.1 G/DL
MCV RBC AUTO: 102 FL
NEUTROPHILS # BLD AUTO: 2.2 K/UL
PLATELET # BLD AUTO: 164 K/UL
PMV BLD AUTO: 9.7 FL
POTASSIUM SERPL-SCNC: 4.2 MMOL/L
PROT SERPL-MCNC: 5.8 G/DL
RBC # BLD AUTO: 3.13 M/UL
SODIUM SERPL-SCNC: 141 MMOL/L
WBC # BLD AUTO: 4.83 K/UL

## 2017-08-18 PROCEDURE — 85027 COMPLETE CBC AUTOMATED: CPT | Mod: NTX

## 2017-08-18 PROCEDURE — 3008F BODY MASS INDEX DOCD: CPT | Mod: S$GLB,,, | Performed by: INTERNAL MEDICINE

## 2017-08-18 PROCEDURE — 99999 PR PBB SHADOW E&M-EST. PATIENT-LVL III: CPT | Mod: PBBFAC,,, | Performed by: INTERNAL MEDICINE

## 2017-08-18 PROCEDURE — 36415 COLL VENOUS BLD VENIPUNCTURE: CPT | Mod: NTX

## 2017-08-18 PROCEDURE — 99214 OFFICE O/P EST MOD 30 MIN: CPT | Mod: S$GLB,,, | Performed by: INTERNAL MEDICINE

## 2017-08-18 PROCEDURE — 80053 COMPREHEN METABOLIC PANEL: CPT | Mod: TXP

## 2017-08-18 RX ORDER — LOSARTAN POTASSIUM 100 MG/1
100 TABLET ORAL DAILY
Qty: 30 TABLET | Refills: 5 | Status: SHIPPED | OUTPATIENT
Start: 2017-08-18 | End: 2017-12-18 | Stop reason: SDUPTHER

## 2017-08-21 ENCOUNTER — TELEPHONE (OUTPATIENT)
Dept: FAMILY MEDICINE | Facility: CLINIC | Age: 52
End: 2017-08-21

## 2017-08-21 ENCOUNTER — OFFICE VISIT (OUTPATIENT)
Dept: FAMILY MEDICINE | Facility: CLINIC | Age: 52
End: 2017-08-21
Payer: COMMERCIAL

## 2017-08-21 VITALS
TEMPERATURE: 98 F | SYSTOLIC BLOOD PRESSURE: 155 MMHG | HEART RATE: 95 BPM | OXYGEN SATURATION: 96 % | WEIGHT: 139.25 LBS | HEIGHT: 64 IN | DIASTOLIC BLOOD PRESSURE: 85 MMHG | BODY MASS INDEX: 23.77 KG/M2

## 2017-08-21 DIAGNOSIS — Z23 FLU VACCINE NEED: ICD-10-CM

## 2017-08-21 DIAGNOSIS — Z99.2 ESRD (END STAGE RENAL DISEASE) ON DIALYSIS: ICD-10-CM

## 2017-08-21 DIAGNOSIS — R73.03 PREDIABETES: ICD-10-CM

## 2017-08-21 DIAGNOSIS — Z12.31 ENCOUNTER FOR SCREENING MAMMOGRAM FOR BREAST CANCER: ICD-10-CM

## 2017-08-21 DIAGNOSIS — C79.89 METASTATIC RENAL CELL CARCINOMA TO INTRA-ABDOMINAL SITE: ICD-10-CM

## 2017-08-21 DIAGNOSIS — N18.6 ESRD (END STAGE RENAL DISEASE) ON DIALYSIS: ICD-10-CM

## 2017-08-21 DIAGNOSIS — I51.89 COMBINED SYSTOLIC AND DIASTOLIC CARDIAC DYSFUNCTION: ICD-10-CM

## 2017-08-21 DIAGNOSIS — C64.9 METASTATIC RENAL CELL CARCINOMA TO INTRA-ABDOMINAL SITE: ICD-10-CM

## 2017-08-21 DIAGNOSIS — I10 ESSENTIAL HYPERTENSION: Primary | ICD-10-CM

## 2017-08-21 PROBLEM — R11.0 NAUSEA: Status: RESOLVED | Noted: 2017-06-14 | Resolved: 2017-08-21

## 2017-08-21 PROCEDURE — 3008F BODY MASS INDEX DOCD: CPT | Mod: S$GLB,,, | Performed by: INTERNAL MEDICINE

## 2017-08-21 PROCEDURE — 99999 PR PBB SHADOW E&M-EST. PATIENT-LVL III: CPT | Mod: PBBFAC,,, | Performed by: INTERNAL MEDICINE

## 2017-08-21 PROCEDURE — 99214 OFFICE O/P EST MOD 30 MIN: CPT | Mod: S$GLB,,, | Performed by: INTERNAL MEDICINE

## 2017-08-21 RX ORDER — AMLODIPINE BESYLATE 5 MG/1
5 TABLET ORAL DAILY
Qty: 90 TABLET | Refills: 0 | Status: SHIPPED | OUTPATIENT
Start: 2017-08-21 | End: 2017-12-18 | Stop reason: SDUPTHER

## 2017-08-21 NOTE — TELEPHONE ENCOUNTER
Please advise patient: I heard back from the cardiologist. They recommended we try adding a low dose of a medication called amlodipine. I sent the Rx to the pharmacy. She needs to call if she has any problems with the medication. Recommend office visit in 4 weeks with nursing staff for a repeat BP check.

## 2017-08-21 NOTE — Clinical Note
Dr. Louis, I saw our mutual patient in the clinic today. BP is still running high. She could not tolerate the higher dose of metoprolol (caused palpitations). Not sure if you have any other recommendations at this time. She will see you back in October. Thanks! Sowmya

## 2017-08-21 NOTE — TELEPHONE ENCOUNTER
----- Message from Leora Louis MD sent at 8/21/2017  4:39 PM CDT -----  Justin Deng,    She could try amlodipine 5 mg daily.    Thanks,  Leora  ----- Message -----  From: Sowmya Mccain MD  Sent: 8/21/2017   8:27 AM  To: MD Dr. Missy Valles, I saw our mutual patient in the clinic today. BP is still running high. She could not tolerate the higher dose of metoprolol (caused palpitations). Not sure if you have any other recommendations at this time. She will see you back in October.  Thanks! Sowmya

## 2017-08-21 NOTE — PROGRESS NOTES
HISTORY OF PRESENT ILLNESS:  Eva Bloom is a 51 y.o. female who presents to the clinic today for Hypertension (3 Month Follow Up)  .  The patient presents to clinic today for follow-up of hypertension/CHF, prediabetes, and end-stage renal disease.  She has not been checking her blood pressures at home recently.  Blood pressures tend to be high with other office visits.  She continues on dialysis Tuesday, Thursday, and Saturday.  She states her blood pressures tend to fluctuate.  Her cardiologist tried to increase her metoprolol XL to 100 mg daily, but it caused palpitations.  She can only tolerate 50 mg daily.  She reports compliance with her other blood pressure medications.  She reports low-salt diet.  She stays active, but does not exercise regularly.  She is followed by oncology for her metastatic renal cell carcinoma.  She is on chemotherapy to help prevent progression.  She has follow-up in October.   The patient denies any problems with cardiac chest pain, dizziness, palpitations, orthopnea, or lower extremity edema.  She denies abdominal pain, temperature intolerance, or unexplained changes in her weight.      PAST MEDICAL HISTORY:  Past Medical History:   Diagnosis Date    Anemia     Bronchitis 03/01/2017    Cancer 2016    kidney cancer    CMV (cytomegalovirus) antibody positive     Essential hypertension 9/23/2015    H/O herpes simplex type 2 infection     Herpes simplex type 1 antibody positive     History of kidney cancer     s/p left nephrectomy 1/2016    Hyperparathyroidism, unspecified     Hyperuricemia without signs of inflammatory arthritis and tophaceous disease     Kidney stones     LGSIL (low grade squamous intraepithelial dysplasia)     Prediabetes     Proteinuria     Renal disorder     Thyroid nodule     Urate nephropathy        PAST SURGICAL HISTORY:  Past Surgical History:   Procedure Laterality Date    BREAST SURGERY      LUMPECTOMY    COLONOSCOPY N/A 11/12/2015     NEPHRECTOMY-LAPAROSCOPIC Left 01/12/2016    PERITONEAL CATHETER INSERTION      Permacath insertion  01/12/2017    SALPINGOOPHORECTOMY Right 2016    [facility]---DAVINCI    TONSILLECTOMY      TUBAL LIGATION         SOCIAL HISTORY:  Social History     Social History    Marital status:      Spouse name: N/A    Number of children: 2    Years of education: N/A     Occupational History    [employer] #911     Social History Main Topics    Smoking status: Never Smoker    Smokeless tobacco: Never Used    Alcohol use No      Comment: . 2 children. works at [employer].    Drug use: No    Sexual activity: Yes     Partners: Male     Other Topics Concern    Not on file     Social History Narrative    No narrative on file       FAMILY HISTORY:  Family History   Problem Relation Age of Onset    Hypertension Mother     Diabetes Father     Kidney disease Father     Diabetes Maternal Grandfather     No Known Problems Sister     No Known Problems Brother     Heart disease Sister     No Known Problems Sister     No Known Problems Brother     Breast cancer Neg Hx     Colon cancer Neg Hx     Cancer Neg Hx     Stroke Neg Hx        ALLERGIES AND MEDICATIONS: updated and reviewed.  Review of patient's allergies indicates:   Allergen Reactions    Coreg [carvedilol] Other (See Comments)     Nausea/vomiting    Allopurinol      Other reaction(s): abnormal transaminases     Medication List with Changes/Refills   Current Medications    ACETAMINOPHEN (TYLENOL) 500 MG TABLET    Take 500 mg by mouth every 6 (six) hours as needed for Pain.    ALUMINUM-MAGNESIUM HYDROXIDE-SIMETHICONE (MAALOX) 200-200-20 MG/5 ML SUSP    Take 30 mLs by mouth every 6 (six) hours as needed.     CABOZANTINIB 60 MG TAB    Take 60 mg by mouth once daily.    FEBUXOSTAT (ULORIC) 40 MG TAB    Take 1 tablet (40 mg total) by mouth once daily.    FUROSEMIDE (LASIX) 40 MG TABLET    Take 1 tablet (40 mg total) by mouth daily as needed  (weight gain of 3 lbs in 24 hrs or 5 lbs in 1 week).    HYDRALAZINE (APRESOLINE) 100 MG TABLET    Take 1 tablet (100 mg total) by mouth every 12 (twelve) hours.    ISOSORBIDE MONONITRATE (IMDUR) 30 MG 24 HR TABLET    Take 1 tablet (30 mg total) by mouth once daily.    LOSARTAN (COZAAR) 100 MG TABLET    Take 1 tablet (100 mg total) by mouth once daily.    METOPROLOL SUCCINATE (TOPROL-XL) 50 MG 24 HR TABLET    Take 1 tablet (50 mg total) by mouth once daily.    MULTIVITAMIN-CA-IRON-MINERALS (ONE-A-DAY WOMENS FORMULA) 27-0.4 MG TAB    Take by mouth. 1 Tablet Oral Every day    ONDANSETRON (ZOFRAN-ODT) 8 MG TBDL    Take 1 tablet (8 mg total) by mouth every 12 (twelve) hours as needed.    PSYLLIUM HUSK-CALCIUM (METAMUCIL PLUS CALCIUM) 1-60 GRAM-MG CAP    Take by mouth. 2 Capsule Oral Every morning    SEVELAMER HCL (RENAGEL) 800 MG TAB    Take 2 tablets (1,600 mg total) by mouth 3 (three) times daily with meals.    TRAZODONE (DESYREL) 50 MG TABLET    Take 1 tablet (50 mg total) by mouth every evening.            REVIEW OF SYSTEMS:  General ROS: negative for - chills, fever or malaise  Psychological ROS: negative for - memory difficulties, obsessive thoughts or suicidal ideation  Ophthalmic ROS: negative for - blurry vision or eye pain  ENT ROS: negative for - epistaxis, headaches, nasal congestion, oral lesions or sore throat  Allergy and Immunology ROS: negative for - hives  Hematological and Lymphatic ROS: negative for - swollen lymph nodes  Endocrine ROS: negative for - polydipsia/polyuria or temperature intolerance  Respiratory ROS: no cough, shortness of breath, or wheezing  Cardiovascular ROS: no chest pain or dyspnea on exertion  Gastrointestinal ROS: no abdominal pain, change in bowel habits, or black or bloody stools  Dermatological ROS: negative for mole changes and rash  Musculoskeletal ROS: negative for - gait disturbance, joint swelling, muscle pain or muscular weakness  Neurological ROS: no TIA or stroke  "symptoms  Genito-Urinary ROS: no dysuria, trouble voiding, or hematuria        PHYSICAL EXAM:   Vitals:    08/21/17 0830   BP: (!) 155/85   Pulse:    Temp:      Weight: 63.2 kg (139 lb 3.5 oz)   Height: 5' 4" (162.6 cm)   Body mass index is 23.9 kg/m².     General appearance - alert, well appearing, and in no distress and normal appearing weight  Mental status - alert, oriented to person, place, and time, normal mood, behavior, speech, dress, motor activity, and thought processes  Eyes - pupils equal and reactive, extraocular eye movements intact, sclera anicteric  Mouth - mucous membranes moist, pharynx normal without lesions  Neck - supple, no significant adenopathy, carotids upstroke normal bilaterally, no bruits, thyroid exam: thyroid is normal in size without obvious nodules or tenderness  Lymphatics - no palpable lymphadenopathy  Chest - clear to auscultation, no wheezes, rales or rhonchi, symmetric air entry  Heart - normal rate and regular rhythm, no gallops noted, systolic murmur 4/6   Neurological - alert, oriented, normal speech, no focal findings or movement disorder noted, cranial nerves II through XII intact  Musculoskeletal - no joint tenderness, deformity or swelling, no muscular tenderness noted  Extremities - peripheral pulses normal, no pedal edema, no clubbing or cyanosis  Skin - normal coloration and turgor, no rashes, no suspicious skin lesions noted      ASSESSMENT AND PLAN:  1. Essential hypertension/2. Combined systolic and diastolic cardiac dysfunction  Blood pressures are not well controlled.  I will ask her cardiologist for further input on medication changes.  We discussed low-salt diet and regular exercise.  I encouraged her to enroll in the digital hypertension program for additional monitoring/treatment.  She is due for follow-up with cardiology in October.  - Hypertension Digital Medicine (HDMP) Enrollment Order  - [facility] Assign Onboarding " Questionnaires    3. Prediabetes  Stable. We discussed low sugar/low carbohydrate diet and regular exercise to prevent progression. No need for prescription medication at this time.     4. ESRD (end stage renal disease) on dialysis  The current medical regimen is effective;  continue present plan and medications.  Followed by nephrology.    5. Metastatic renal cell carcinoma to intra-abdominal site  Continue current chemotherapy for suppression.  Followed by oncology.  Follow-up in October.    6. Encounter for screening mammogram for breast cancer    - Mammo Digital Screening Bilat with CAD; Future    7. Flu vaccine need  She will ask her oncologist if she can take the flu shot.      She will come in October/November of this she of for her Pap smear.    Return in about 6 months (around 2/21/2018), or if symptoms worsen or fail to improve, for annual exam. or sooner as needed.

## 2017-08-22 NOTE — TELEPHONE ENCOUNTER
Spoke w/patient, informed Rx sent to pharmacy with recommendations. Verbalized understanding. B/P check scheduled 9/11/17.

## 2017-08-23 DIAGNOSIS — I10 ESSENTIAL HYPERTENSION: ICD-10-CM

## 2017-08-23 NOTE — TELEPHONE ENCOUNTER
Please clarify with patient: I thought she was on metoprolol 50mg daily - I received a refill request for 25mg.

## 2017-08-24 NOTE — TELEPHONE ENCOUNTER
Called patient to see what dosage of metoprolol she is taking if it's 25mg our 50mg. No answer left message to call office.

## 2017-08-28 DIAGNOSIS — I10 ESSENTIAL HYPERTENSION: Chronic | ICD-10-CM

## 2017-08-28 DIAGNOSIS — I51.89 COMBINED SYSTOLIC AND DIASTOLIC CARDIAC DYSFUNCTION: ICD-10-CM

## 2017-08-28 RX ORDER — METOPROLOL SUCCINATE 50 MG/1
50 TABLET, EXTENDED RELEASE ORAL DAILY
Qty: 30 TABLET | Refills: 3 | Status: SHIPPED | OUTPATIENT
Start: 2017-08-28 | End: 2017-08-29 | Stop reason: SDUPTHER

## 2017-08-29 ENCOUNTER — TELEPHONE (OUTPATIENT)
Dept: FAMILY MEDICINE | Facility: CLINIC | Age: 52
End: 2017-08-29

## 2017-08-29 RX ORDER — METOPROLOL SUCCINATE 50 MG/1
50 TABLET, EXTENDED RELEASE ORAL DAILY
Qty: 90 TABLET | Refills: 0 | Status: SHIPPED | OUTPATIENT
Start: 2017-08-29 | End: 2017-12-18 | Stop reason: SDUPTHER

## 2017-08-29 NOTE — TELEPHONE ENCOUNTER
Called patient and informed patient that medication was refilled metoprolol 50 mg take one tablet by mouth daily and can be picked up at the pharmacy.

## 2017-08-29 NOTE — TELEPHONE ENCOUNTER
Called patient to see what dosage of metoprolol she is taking patient states she is taking 25mg as always been on 25mg. Informed patient looking in her chart she should be taking 50 patient states blood pressure is still high and is willing to take 50mg of metoprolol.

## 2017-09-11 ENCOUNTER — CLINICAL SUPPORT (OUTPATIENT)
Dept: FAMILY MEDICINE | Facility: CLINIC | Age: 52
End: 2017-09-11
Payer: COMMERCIAL

## 2017-09-11 VITALS — HEART RATE: 72 BPM | DIASTOLIC BLOOD PRESSURE: 74 MMHG | SYSTOLIC BLOOD PRESSURE: 140 MMHG

## 2017-09-11 DIAGNOSIS — I10 BENIGN ESSENTIAL HTN: Primary | ICD-10-CM

## 2017-09-11 PROCEDURE — 99499 UNLISTED E&M SERVICE: CPT | Mod: S$GLB,,, | Performed by: INTERNAL MEDICINE

## 2017-09-11 PROCEDURE — 99999 PR PBB SHADOW E&M-EST. PATIENT-LVL II: CPT | Mod: PBBFAC,,,

## 2017-09-11 NOTE — PROGRESS NOTES
BP improved although still not quite at goal (139/89 or less). Recommend to come back for check in 4 weeks with nursing staff.

## 2017-09-11 NOTE — PROGRESS NOTES
Eva Bloom 51 y.o. female is here today for Blood Pressure check.   History of HTN yes.    Review of patient's allergies indicates:   Allergen Reactions    Coreg [carvedilol] Other (See Comments)     Nausea/vomiting    Allopurinol      Other reaction(s): abnormal transaminases     Creatinine   Date Value Ref Range Status   08/18/2017 7.1 (H) 0.5 - 1.4 mg/dL Final     Sodium   Date Value Ref Range Status   08/18/2017 141 136 - 145 mmol/L Final     Potassium   Date Value Ref Range Status   08/18/2017 4.2 3.5 - 5.1 mmol/L Final   ]  Patient verifies taking blood pressure medications on a regular basis at the same time of the day.     Current Outpatient Prescriptions:     acetaminophen (TYLENOL) 500 MG tablet, Take 500 mg by mouth every 6 (six) hours as needed for Pain., Disp: , Rfl:     aluminum-magnesium hydroxide-simethicone (MAALOX) 200-200-20 mg/5 mL Susp, Take 30 mLs by mouth every 6 (six) hours as needed. , Disp: , Rfl:     amlodipine (NORVASC) 5 MG tablet, Take 1 tablet (5 mg total) by mouth once daily., Disp: 90 tablet, Rfl: 0    cabozantinib 60 mg Tab, Take 60 mg by mouth once daily., Disp: 30 tablet, Rfl: 5    febuxostat (ULORIC) 40 mg Tab, Take 1 tablet (40 mg total) by mouth once daily., Disp: 30 tablet, Rfl: 4    furosemide (LASIX) 40 MG tablet, Take 1 tablet (40 mg total) by mouth daily as needed (weight gain of 3 lbs in 24 hrs or 5 lbs in 1 week)., Disp: 30 tablet, Rfl: 11    hydrALAZINE (APRESOLINE) 100 MG tablet, Take 1 tablet (100 mg total) by mouth every 12 (twelve) hours., Disp: 90 tablet, Rfl: 3    isosorbide mononitrate (IMDUR) 30 MG 24 hr tablet, Take 1 tablet (30 mg total) by mouth once daily., Disp: 30 tablet, Rfl: 11    losartan (COZAAR) 100 MG tablet, Take 1 tablet (100 mg total) by mouth once daily., Disp: 30 tablet, Rfl: 5    metoprolol succinate (TOPROL-XL) 50 MG 24 hr tablet, Take 1 tablet (50 mg total) by mouth once daily., Disp: 90 tablet, Rfl: 0     multivitamin-Ca-iron-minerals (ONE-A-DAY WOMENS FORMULA) 27-0.4 mg Tab, Take by mouth. 1 Tablet Oral Every day, Disp: , Rfl:     ondansetron (ZOFRAN-ODT) 8 MG TbDL, Take 1 tablet (8 mg total) by mouth every 12 (twelve) hours as needed., Disp: 30 tablet, Rfl: 1    psyllium husk-calcium (METAMUCIL PLUS CALCIUM) 1-60 gram-mg Cap, Take by mouth. 2 Capsule Oral Every morning, Disp: , Rfl:     sevelamer HCl (RENAGEL) 800 MG Tab, Take 2 tablets (1,600 mg total) by mouth 3 (three) times daily with meals., Disp: 180 tablet, Rfl: 11    trazodone (DESYREL) 50 MG tablet, Take 1 tablet (50 mg total) by mouth every evening., Disp: 30 tablet, Rfl: 11  Does patient have record of home blood pressure readings no. .   Last dose of blood pressure medication was taken at 7:30  Patient is asymptomatic.   .    BP: (!) 140/80 , Pulse: 72 .    Blood pressure reading after 15 minutes was 140/74, Pulse 72  Dr. Mccain notified.

## 2017-09-21 NOTE — PROGRESS NOTES
Called patient advised of physician's recommendation. Patient verbalized an understanding.Appointment scheduled as per physician's recommendation

## 2017-09-22 ENCOUNTER — COMMITTEE REVIEW (OUTPATIENT)
Dept: TRANSPLANT | Facility: CLINIC | Age: 52
End: 2017-09-22

## 2017-09-22 ENCOUNTER — HOSPITAL ENCOUNTER (OUTPATIENT)
Dept: RADIOLOGY | Facility: HOSPITAL | Age: 52
Discharge: HOME | End: 2017-09-22
Attending: NURSE PRACTITIONER
Payer: COMMERCIAL

## 2017-09-22 DIAGNOSIS — N18.6 ESRD ON HEMODIALYSIS: ICD-10-CM

## 2017-09-22 DIAGNOSIS — I12.9 HYPERTENSIVE KIDNEY DISEASE WITH CHRONIC KIDNEY DISEASE, STAGE 1-4 OR UNSPECIFIED CHRONIC KIDNEY DISEASE: ICD-10-CM

## 2017-09-22 DIAGNOSIS — Z90.721 HISTORY OF RIGHT OOPHORECTOMY: ICD-10-CM

## 2017-09-22 DIAGNOSIS — Z90.5 S/P NEPHRECTOMY: ICD-10-CM

## 2017-09-22 DIAGNOSIS — D63.1 ANEMIA IN ESRD (END-STAGE RENAL DISEASE): ICD-10-CM

## 2017-09-22 DIAGNOSIS — N18.6 ANEMIA IN ESRD (END-STAGE RENAL DISEASE): ICD-10-CM

## 2017-09-22 DIAGNOSIS — Z99.2 ESRD ON HEMODIALYSIS: ICD-10-CM

## 2017-09-22 DIAGNOSIS — R79.89 ELEVATED LFTS: ICD-10-CM

## 2017-09-22 DIAGNOSIS — Z51.11 ENCOUNTER FOR CHEMOTHERAPY MANAGEMENT: ICD-10-CM

## 2017-09-22 DIAGNOSIS — E04.1 THYROID NODULE: ICD-10-CM

## 2017-09-22 DIAGNOSIS — C64.2 METASTATIC RENAL CELL CARCINOMA, LEFT: ICD-10-CM

## 2017-09-22 DIAGNOSIS — R93.1 ABNORMAL ECHOCARDIOGRAM: ICD-10-CM

## 2017-09-22 DIAGNOSIS — C78.6 MALIGNANT NEOPLASM METASTATIC TO PERITONEUM: ICD-10-CM

## 2017-09-22 PROCEDURE — 71250 CT THORAX DX C-: CPT | Mod: TC,NTX

## 2017-09-22 PROCEDURE — 71250 CT THORAX DX C-: CPT | Mod: 26,NTX,, | Performed by: RADIOLOGY

## 2017-09-22 PROCEDURE — 74181 MRI ABDOMEN W/O CONTRAST: CPT | Mod: TC,TXP

## 2017-09-22 PROCEDURE — 74181 MRI ABDOMEN W/O CONTRAST: CPT | Mod: 26,NTX,, | Performed by: RADIOLOGY

## 2017-09-22 NOTE — COMMITTEE REVIEW
Patient's candidacy for transplant discussed in selection committee. Patient is no longer a candidate for transplant and will be removed from the waiting list due to recurrence of cancer, low EF.

## 2017-09-25 ENCOUNTER — HOSPITAL ENCOUNTER (OUTPATIENT)
Dept: CARDIOLOGY | Facility: CLINIC | Age: 52
Discharge: HOME | End: 2017-09-25
Payer: COMMERCIAL

## 2017-09-25 DIAGNOSIS — I27.29 OTHER SECONDARY PULMONARY HYPERTENSION: ICD-10-CM

## 2017-09-25 DIAGNOSIS — I51.89 COMBINED SYSTOLIC AND DIASTOLIC CARDIAC DYSFUNCTION: ICD-10-CM

## 2017-09-25 DIAGNOSIS — I27.20 PULMONARY HYPERTENSION: ICD-10-CM

## 2017-09-25 PROCEDURE — 93306 TTE W/DOPPLER COMPLETE: CPT | Mod: NTX,S$GLB,, | Performed by: INTERNAL MEDICINE

## 2017-09-25 PROCEDURE — 0399T 2D ECHO WITH COLOR FLOW DOPPLER: CPT | Mod: NTX,S$GLB,, | Performed by: INTERNAL MEDICINE

## 2017-09-28 NOTE — PROGRESS NOTES
"Subjective:       Patient ID: Eva Bloom is a 51 y.o. female.    Chief Complaint: RCC    HPI     51 year old female to clinic for 6 week follow up and to review restaging scans for recurrent metastatic renal cell carcinoma. Pt is currently taking cabozantinib daily. Pt is on ESRD with HD every TTS. Patient tolerating treat extremely well and has no complaints today.    Today, she denies any mouth sores, nausea, vomiting, diarrhea, constipation,weight loss, chest pain, leg swelling, headache, dizziness, or mood changes.    ECOG 1. She is accompanied to clinic alone.    Oncologic History (From Chart and Patient):  Eva Bloom is a 51 y.o. female with ESRD on HD who was found to have two L renal masses. She underwent L lap nephrectomy on 1/12/16. 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 Margins were negative.  Shehealed from surgery well, but then developed a large ovarian mass.  This was removed by gyn along with multiple sites of metastatic disease in the pelvis.  Path was c/w recurrence of RCC.     11/20/16 Pelvic ultrasound reveals "Large heterogeneous cystic and solid mass which appears to arise from the right ovary with worrisome imaging features for malignancy.  Differential diagnosis includes malignant tumors such as serous or mucinous cystadenocarcinoma.  It is important to note that the patient is at risk for ovarian torsion due to the size of the mass.Multiple uterine fibroids are identified with the largest in the uterine fundus."    11/22/16 pathology reveals "FINAL PATHOLOGIC DIAGNOSIS-RIGHT OVARY AND FALLOPIAN TUBE, RIGHT SALPINGO-OOPHORECTOMY:  -Positive for malignancy, high grade carcinoma morphologically and immunohistochemically consistent with metastasis from the patient's known renal cell carcinoma"    1/23/17 MRI abdomen reveals "Status post left nephrectomy, the " "left nephrectomy bed appears normal.There is a small liver lesion and a small splenic lesion, they cannot be fully characterized but they are unchanged from 5/27/16 suggestive of benign lesions.Multiple right kidney cysts.Cholelithiasis.Soft tissue oval density noted within the mesentery adjacent to the aorta right iliac bifurcation, nonspecific, could represent an enlarged lymph node, it is unchanged from prior studies"    1/23/17- CT Chest reveals "Stable pulmonary nodules, likely benign. No new pulmonary parenchymal abnormalities are identified. Enlarging nodular foci in partially visualized left upper abdomen. Peritoneal implants not excluded. Multiple thyroid nodules, measuring up to 1.6 cm."    4/5/17-MRI abdomen reveals "Again seen are the T2 hyperintense hepatic and splenic lesions that appear similar to prior exam and possibly represents hemangiomas. There are right renal cysts and the patient is status post left nephrectomy."    7/3/17- MRI abdomen "Left nephrectomy.  Right renal cysts with no malignant features.Liver and splenic lesions consistent with cysts versus hemangiomas.Cholelithiasis.Right pleural effusion and right basal atelectasis.Small pericardial effusion.Body wall edema and mesenteric edema."  7/3/17- CT chest "In this patient with a history of left renal cell carcinoma, there are 2 right lung nodules which are stable compared to January 2017.  2 left lung nodules seen on that study are not well-seen today, may be related to a small amount of pleural fluid.Bilateral pleural effusions, mildly decreased compared to prior study.Small amount of pericardial fluid, new."    Review of Systems   Constitutional: Positive for appetite change and fatigue. Negative for activity change, chills and fever.   HENT: Negative for congestion, hearing loss, mouth sores, sore throat, tinnitus and voice change.    Eyes: Negative for pain and visual disturbance.   Respiratory: Positive for shortness of breath. " Negative for cough and wheezing.    Cardiovascular: Negative for chest pain, palpitations and leg swelling.   Gastrointestinal: Negative for abdominal pain, constipation, diarrhea, nausea and vomiting.   Endocrine: Negative for cold intolerance and heat intolerance.   Genitourinary: Negative for difficulty urinating, dyspareunia, dysuria, frequency, menstrual problem, urgency, vaginal bleeding, vaginal discharge and vaginal pain.   Musculoskeletal: Negative for arthralgias and myalgias.   Skin: Negative for color change, rash and wound.   Allergic/Immunologic: Negative for environmental allergies and food allergies.   Neurological: Negative for weakness, numbness and headaches.   Hematological: Negative for adenopathy. Does not bruise/bleed easily.   Psychiatric/Behavioral: Negative for agitation, confusion, hallucinations and sleep disturbance. The patient is not nervous/anxious.    All other systems reviewed and are negative.      Allergies:  Review of patient's allergies indicates:   Allergen Reactions    Allopurinol      Other reaction(s): abnormal transaminases       Medications:  Current Outpatient Prescriptions   Medication Sig Dispense Refill    acetaminophen (TYLENOL) 500 MG tablet Take 500 mg by mouth every 6 (six) hours as needed for Pain.      aluminum-magnesium hydroxide-simethicone (MAALOX) 200-200-20 mg/5 mL Susp Take 30 mLs by mouth every 6 (six) hours as needed.       amlodipine (NORVASC) 5 MG tablet Take 1 tablet (5 mg total) by mouth once daily. 90 tablet 0    cabozantinib 60 mg Tab Take 60 mg by mouth once daily. 30 tablet 5    febuxostat (ULORIC) 40 mg Tab Take 1 tablet (40 mg total) by mouth once daily. 30 tablet 4    furosemide (LASIX) 40 MG tablet Take 1 tablet (40 mg total) by mouth daily as needed (weight gain of 3 lbs in 24 hrs or 5 lbs in 1 week). 30 tablet 11    hydrALAZINE (APRESOLINE) 100 MG tablet Take 1 tablet (100 mg total) by mouth every 12 (twelve) hours. 90 tablet 3     isosorbide mononitrate (IMDUR) 30 MG 24 hr tablet Take 1 tablet (30 mg total) by mouth once daily. 30 tablet 11    losartan (COZAAR) 100 MG tablet Take 1 tablet (100 mg total) by mouth once daily. 30 tablet 5    metoprolol succinate (TOPROL-XL) 50 MG 24 hr tablet Take 1 tablet (50 mg total) by mouth once daily. 90 tablet 0    multivitamin-Ca-iron-minerals (ONE-A-DAY WOMENS FORMULA) 27-0.4 mg Tab Take by mouth. 1 Tablet Oral Every day      ondansetron (ZOFRAN-ODT) 8 MG TbDL Take 1 tablet (8 mg total) by mouth every 12 (twelve) hours as needed. 30 tablet 1    psyllium husk-calcium (METAMUCIL PLUS CALCIUM) 1-60 gram-mg Cap Take by mouth. 2 Capsule Oral Every morning      sevelamer HCl (RENAGEL) 800 MG Tab Take 2 tablets (1,600 mg total) by mouth 3 (three) times daily with meals. 180 tablet 11    trazodone (DESYREL) 50 MG tablet Take 1 tablet (50 mg total) by mouth every evening. 30 tablet 11     No current facility-administered medications for this visit.        PMH:  Past Medical History:   Diagnosis Date    Anemia     Bronchitis 03/01/2017    Cancer 2016    kidney cancer    CMV (cytomegalovirus) antibody positive     Essential hypertension 9/23/2015    H/O herpes simplex type 2 infection     Herpes simplex type 1 antibody positive     History of kidney cancer     s/p left nephrectomy 1/2016    Hyperparathyroidism, unspecified     Hyperuricemia without signs of inflammatory arthritis and tophaceous disease     Kidney stones     LGSIL (low grade squamous intraepithelial dysplasia)     Prediabetes     Proteinuria     Renal disorder     Thyroid nodule     Urate nephropathy        PSH:  Past Surgical History:   Procedure Laterality Date    BREAST SURGERY      LUMPECTOMY    COLONOSCOPY N/A 11/12/2015    NEPHRECTOMY-LAPAROSCOPIC Left 01/12/2016    PERITONEAL CATHETER INSERTION      Permacath insertion  01/12/2017    SALPINGOOPHORECTOMY Right 2016    KJB---DAVINCI    TONSILLECTOMY      TUBAL  LIGATION         FamHx:  Family History   Problem Relation Age of Onset    Hypertension Mother     Diabetes Father     Kidney disease Father     Diabetes Maternal Grandfather     No Known Problems Sister     No Known Problems Brother     Heart disease Sister     No Known Problems Sister     No Known Problems Brother     Breast cancer Neg Hx     Colon cancer Neg Hx     Cancer Neg Hx     Stroke Neg Hx        SocHx:  Social History     Social History    Marital status:      Spouse name: N/A    Number of children: 2    Years of education: N/A     Occupational History    Specialty [employer]     Social History Main Topics    Smoking status: Never Smoker    Smokeless tobacco: Never Used    Alcohol use No      Comment: . 2 children. works at [employer].    Drug use: No    Sexual activity: Yes     Partners: Male     Other Topics Concern    Not on file     Social History Narrative    No narrative on file         Objective:      Physical Exam   Constitutional: She is oriented to person, place, and time. She appears well-developed and well-nourished. No distress.   HENT:   Head: Normocephalic and atraumatic.   Nose: Nose normal.   Mouth/Throat: Oropharynx is clear and moist. No oropharyngeal exudate.   Eyes: Conjunctivae and EOM are normal. Pupils are equal, round, and reactive to light. Right eye exhibits no discharge. Left eye exhibits no discharge.   Neck: Normal range of motion. Neck supple. No tracheal deviation present.   Cardiovascular: Normal rate and regular rhythm.  Exam reveals no gallop and no friction rub.    Murmur heard.  Pulmonary/Chest: Effort normal and breath sounds normal. No respiratory distress. She has no wheezes. She has no rales.   Abdominal: Soft. Bowel sounds are normal. She exhibits no distension. There is no tenderness. There is no rebound and no guarding.   Genitourinary: No breast tenderness, discharge or bleeding.   Musculoskeletal: Normal range of  motion. She exhibits no edema or tenderness.   Neurological: She is alert and oriented to person, place, and time. She has normal reflexes. No cranial nerve deficit. Coordination normal.   Skin: Skin is warm and dry. No rash noted. She is not diaphoretic. No erythema. No pallor.   Psychiatric: She has a normal mood and affect. Her behavior is normal. Thought content normal.   Nursing note and vitals reviewed.      LABS:  WBC   Date Value Ref Range Status   08/18/2017 4.83 3.90 - 12.70 K/uL Final     Hemoglobin   Date Value Ref Range Status   08/18/2017 10.6 (L) 12.0 - 16.0 g/dL Final     Hematocrit   Date Value Ref Range Status   08/18/2017 32.0 (L) 37.0 - 48.5 % Final     Platelets   Date Value Ref Range Status   08/18/2017 164 150 - 350 K/uL Final       Chemistry        Component Value Date/Time     08/18/2017 0821    K 4.2 08/18/2017 0821     08/18/2017 0821    CO2 26 08/18/2017 0821    BUN 34 (H) 08/18/2017 0821    CREATININE 7.1 (H) 08/18/2017 0821    GLU 91 08/18/2017 0821        Component Value Date/Time    CALCIUM 8.8 08/18/2017 0821    ALKPHOS 88 08/18/2017 0821    AST 36 08/18/2017 0821    ALT 41 08/18/2017 0821    BILITOT 0.6 08/18/2017 0821            Assessment:       1. Metastatic renal cell carcinoma, left    2. Malignant neoplasm metastatic to peritoneum    3. History of right oophorectomy    4. S/p nephrectomy left    5. Encounter for chemotherapy management    6. Anemia in ESRD (end-stage renal disease)    7. ESRD on hemodialysis    8. Hypertensive kidney disease with chronic kidney disease, stage 1-4 or unspecified chronic kidney disease    9. Abnormal echocardiogram    10. Thyroid nodule    11. Elevated LFTs        Plan:     1,2,3,4,5- Metastatic Renal Cell Carcinoma:    - It is unlikely that surgery removed all sites of metastatic disease. Given that this is c/w her previous papillary disease, pt started on oral cabozantinib 60mg daily (for dual VEGF and MET inhibition), especially  given recent data that shows cabo is far superior to student in the first line setting.  If pt progresses on this regimen, will switch to nivolumab.     Restaging scans from 7/3/17 and 9/2017 show stable disease and pt tolerating cabozantinib relatively well. We will continue cabozantinib for now. Cytology from thoracentesis did not show malignant cells. Will plan to get restaging scans in 3 mos.     RTC in 6 weeks with (CBC,CMP) and to see Liset.    6- Mild, will monitor.    7,8,9- BP elevated today. Following with cardiology and PCP to manage.  Send a note to Dr. Zabala.    10- Had thyroid US and biopsy last year, which was negative. Will monitor for now.    The patient indicates understanding of these issues and agrees with the plan.    More than 25 mins were spent during this encounter, greater than 50% was spent in direct counseling and/or coordination of care.     Ben Rivera M.D., M.S., F.A.C.P.  Hematology and Oncology Attending  [facility]

## 2017-09-29 ENCOUNTER — LAB VISIT (OUTPATIENT)
Dept: LAB | Facility: HOSPITAL | Age: 52
End: 2017-09-29
Attending: INTERNAL MEDICINE
Payer: COMMERCIAL

## 2017-09-29 ENCOUNTER — OFFICE VISIT (OUTPATIENT)
Dept: HEMATOLOGY/ONCOLOGY | Facility: CLINIC | Age: 52
End: 2017-09-29
Payer: COMMERCIAL

## 2017-09-29 VITALS
HEART RATE: 80 BPM | DIASTOLIC BLOOD PRESSURE: 85 MMHG | HEIGHT: 64 IN | RESPIRATION RATE: 16 BRPM | SYSTOLIC BLOOD PRESSURE: 158 MMHG | TEMPERATURE: 98 F | WEIGHT: 136 LBS | BODY MASS INDEX: 23.22 KG/M2

## 2017-09-29 DIAGNOSIS — Z99.2 ESRD ON HEMODIALYSIS: ICD-10-CM

## 2017-09-29 DIAGNOSIS — C78.6 MALIGNANT NEOPLASM METASTATIC TO PERITONEUM: ICD-10-CM

## 2017-09-29 DIAGNOSIS — E04.1 THYROID NODULE: ICD-10-CM

## 2017-09-29 DIAGNOSIS — Z51.11 ENCOUNTER FOR CHEMOTHERAPY MANAGEMENT: ICD-10-CM

## 2017-09-29 DIAGNOSIS — N18.6 ESRD ON HEMODIALYSIS: ICD-10-CM

## 2017-09-29 DIAGNOSIS — C64.2 METASTATIC RENAL CELL CARCINOMA, LEFT: Primary | ICD-10-CM

## 2017-09-29 DIAGNOSIS — N18.6 ANEMIA IN ESRD (END-STAGE RENAL DISEASE): ICD-10-CM

## 2017-09-29 DIAGNOSIS — R93.1 ABNORMAL ECHOCARDIOGRAM: ICD-10-CM

## 2017-09-29 DIAGNOSIS — Z90.5 S/P NEPHRECTOMY: ICD-10-CM

## 2017-09-29 DIAGNOSIS — C64.2 METASTATIC RENAL CELL CARCINOMA, LEFT: ICD-10-CM

## 2017-09-29 DIAGNOSIS — I12.9 HYPERTENSIVE KIDNEY DISEASE WITH CHRONIC KIDNEY DISEASE, STAGE 1-4 OR UNSPECIFIED CHRONIC KIDNEY DISEASE: ICD-10-CM

## 2017-09-29 DIAGNOSIS — R79.89 ELEVATED LFTS: ICD-10-CM

## 2017-09-29 DIAGNOSIS — D63.1 ANEMIA IN ESRD (END-STAGE RENAL DISEASE): ICD-10-CM

## 2017-09-29 DIAGNOSIS — Z90.721 HISTORY OF RIGHT OOPHORECTOMY: ICD-10-CM

## 2017-09-29 DIAGNOSIS — R73.03 PREDIABETES: ICD-10-CM

## 2017-09-29 LAB
ALBUMIN SERPL BCP-MCNC: 2.7 G/DL
ALP SERPL-CCNC: 112 U/L
ALT SERPL W/O P-5'-P-CCNC: 19 U/L
ANION GAP SERPL CALC-SCNC: 9 MMOL/L
AST SERPL-CCNC: 23 U/L
BILIRUB SERPL-MCNC: 1 MG/DL
BUN SERPL-MCNC: 29 MG/DL
CALCIUM SERPL-MCNC: 9 MG/DL
CHLORIDE SERPL-SCNC: 99 MMOL/L
CO2 SERPL-SCNC: 32 MMOL/L
CREAT SERPL-MCNC: 6.7 MG/DL
ERYTHROCYTE [DISTWIDTH] IN BLOOD BY AUTOMATED COUNT: 15.2 %
EST. GFR  (AFRICAN AMERICAN): 7.5 ML/MIN/1.73 M^2
EST. GFR  (NON AFRICAN AMERICAN): 6.5 ML/MIN/1.73 M^2
ESTIMATED AVG GLUCOSE: 94 MG/DL
GLUCOSE SERPL-MCNC: 93 MG/DL
HBA1C MFR BLD HPLC: 4.9 %
HCT VFR BLD AUTO: 35.4 %
HGB BLD-MCNC: 11.4 G/DL
MCH RBC QN AUTO: 33.3 PG
MCHC RBC AUTO-ENTMCNC: 32.2 G/DL
MCV RBC AUTO: 104 FL
NEUTROPHILS # BLD AUTO: 3.9 K/UL
PLATELET # BLD AUTO: 206 K/UL
PMV BLD AUTO: 9.9 FL
POTASSIUM SERPL-SCNC: 3.8 MMOL/L
PROT SERPL-MCNC: 6.1 G/DL
RBC # BLD AUTO: 3.42 M/UL
SODIUM SERPL-SCNC: 140 MMOL/L
WBC # BLD AUTO: 6.51 K/UL

## 2017-09-29 PROCEDURE — 85027 COMPLETE CBC AUTOMATED: CPT

## 2017-09-29 PROCEDURE — 99999 PR PBB SHADOW E&M-EST. PATIENT-LVL III: CPT | Mod: PBBFAC,,, | Performed by: INTERNAL MEDICINE

## 2017-09-29 PROCEDURE — 36415 COLL VENOUS BLD VENIPUNCTURE: CPT

## 2017-09-29 PROCEDURE — 80053 COMPREHEN METABOLIC PANEL: CPT

## 2017-09-29 PROCEDURE — 99214 OFFICE O/P EST MOD 30 MIN: CPT | Mod: S$GLB,,, | Performed by: INTERNAL MEDICINE

## 2017-09-29 PROCEDURE — 83036 HEMOGLOBIN GLYCOSYLATED A1C: CPT

## 2017-09-29 PROCEDURE — 3008F BODY MASS INDEX DOCD: CPT | Mod: S$GLB,,, | Performed by: INTERNAL MEDICINE

## 2017-10-02 ENCOUNTER — HOSPITAL ENCOUNTER (OUTPATIENT)
Dept: RADIOLOGY | Facility: HOSPITAL | Age: 52
Discharge: HOME | End: 2017-10-02
Attending: INTERNAL MEDICINE
Payer: COMMERCIAL

## 2017-10-02 VITALS — BODY MASS INDEX: 23.22 KG/M2 | WEIGHT: 136 LBS | HEIGHT: 64 IN

## 2017-10-02 DIAGNOSIS — Z12.31 ENCOUNTER FOR SCREENING MAMMOGRAM FOR BREAST CANCER: ICD-10-CM

## 2017-10-02 PROCEDURE — 77063 BREAST TOMOSYNTHESIS BI: CPT | Mod: 26,,, | Performed by: RADIOLOGY

## 2017-10-02 PROCEDURE — 77067 SCR MAMMO BI INCL CAD: CPT | Mod: TC

## 2017-10-02 PROCEDURE — 77067 SCR MAMMO BI INCL CAD: CPT | Mod: 26,,, | Performed by: RADIOLOGY

## 2017-10-04 ENCOUNTER — PATIENT MESSAGE (OUTPATIENT)
Dept: CARDIOLOGY | Facility: CLINIC | Age: 52
End: 2017-10-04

## 2017-10-04 ENCOUNTER — TELEPHONE (OUTPATIENT)
Dept: CARDIOLOGY | Facility: CLINIC | Age: 52
End: 2017-10-04

## 2017-10-04 NOTE — TELEPHONE ENCOUNTER
Left Ms. Bloom a message letting her know that her echo is unchanged from previous and that we do not need to proceed with an AICD at this time.

## 2017-10-17 ENCOUNTER — TELEPHONE (OUTPATIENT)
Dept: NEPHROLOGY | Facility: CLINIC | Age: 52
End: 2017-10-17

## 2017-10-17 NOTE — TELEPHONE ENCOUNTER
----- Message from Cata Mccormack sent at 10/17/2017  8:27 AM CDT -----  Contact: Mae     [phone]  [employer]  Requesting medical records  Faxed   On the pt for disability claim     Please call back to confirm

## 2017-10-27 ENCOUNTER — OFFICE VISIT (OUTPATIENT)
Dept: VASCULAR SURGERY | Facility: CLINIC | Age: 52
End: 2017-10-27
Payer: COMMERCIAL

## 2017-10-27 ENCOUNTER — HOSPITAL ENCOUNTER (OUTPATIENT)
Dept: VASCULAR SURGERY | Facility: CLINIC | Age: 52
Discharge: HOME | End: 2017-10-27
Attending: NURSE PRACTITIONER
Payer: COMMERCIAL

## 2017-10-27 VITALS
WEIGHT: 133 LBS | HEIGHT: 64 IN | TEMPERATURE: 98 F | SYSTOLIC BLOOD PRESSURE: 175 MMHG | DIASTOLIC BLOOD PRESSURE: 90 MMHG | BODY MASS INDEX: 22.71 KG/M2 | HEART RATE: 72 BPM

## 2017-10-27 DIAGNOSIS — Z99.2 ESRD (END STAGE RENAL DISEASE) ON DIALYSIS: Primary | ICD-10-CM

## 2017-10-27 DIAGNOSIS — Z99.2 ESRD (END STAGE RENAL DISEASE) ON DIALYSIS: ICD-10-CM

## 2017-10-27 DIAGNOSIS — N18.6 ESRD (END STAGE RENAL DISEASE) ON DIALYSIS: Primary | ICD-10-CM

## 2017-10-27 DIAGNOSIS — N18.6 ESRD (END STAGE RENAL DISEASE) ON DIALYSIS: ICD-10-CM

## 2017-10-27 PROCEDURE — 93990 DOPPLER FLOW TESTING: CPT | Mod: S$GLB,,, | Performed by: SURGERY

## 2017-10-27 PROCEDURE — 99213 OFFICE O/P EST LOW 20 MIN: CPT | Mod: S$GLB,,, | Performed by: SURGERY

## 2017-10-27 PROCEDURE — 99999 PR PBB SHADOW E&M-EST. PATIENT-LVL III: CPT | Mod: PBBFAC,,, | Performed by: SURGERY

## 2017-10-27 NOTE — PROGRESS NOTES
See my prior note, review of systems, family history and social history are   unchanged.    HISTORY OF PRESENT ILLNESS:  A 52-year-old female with end-stage renal disease,   well known to me, status post,  1.  Covered stent placement, left AV fistula outflow tract, 03/06/2017.  2.  Left upper arm 4-7 tapered AV graft on 01/01/2017.    Of note, at the time of surgery was found to have an exceptionally thin and   friable 4 mm brachial vein, which she used outflow and which she returned with   the stenosis that was treated empirically with a Covered stent because of the   risk of rupture.    She now returns.  She says on occasion, she has increased bleeding, but this has   not been anything consistent.  No other issues.    PHYSICAL EXAMINATION:  VITAL SIGNS:  See nursing note.  EXTREMITIES:  Left arm shows a good thrill with minimal pulsatility in the   graft.  There is no skin thinning or ulceration.    IMAGING:  Duplex of the graft shows it to be patent with absolutely no stenosis,   foot volume is robust to 2.8 liters.    ASSESSMENT:  Well-functioning left upper arm AV graft with Viabahn stent graft   outflow treatment.    PLAN:  Lengthen surveillance.  Follow up in six months with an AV fistula duplex   if clinically indicated.      TRAVIS/SIMONE  dd: 10/27/2017 09:14:48 (CDT)  td: 10/27/2017 23:50:17 (CDT)  Doc ID   #[id number]  Job ID #[id number]    CC:

## 2017-10-30 ENCOUNTER — TELEPHONE (OUTPATIENT)
Dept: HEMATOLOGY/ONCOLOGY | Facility: CLINIC | Age: 52
End: 2017-10-30

## 2017-11-05 NOTE — PROGRESS NOTES
"Subjective:       Patient ID: Eva Bloom is a 52 y.o. female.    Chief Complaint: RCC    HPI     52 year old female, patient of Dr. Rivera, to clinic for 6 week follow up and to review labs for recurrent metastatic renal cell carcinoma. Pt is currently taking cabozantinib daily. Pt is on ESRD with HD every TTS. Patient tolerating treat extremely well and has no complaints today. She notes increased appetite.    Today, she denies any mouth sores, nausea, vomiting, diarrhea, constipation,weight loss, chest pain, leg swelling, headache, dizziness, or mood changes.    ECOG 1. She is accompanied to clinic alone.    Oncologic History (From Chart and Patient):  Eva Bloom is a 52 y.o. female with ESRD on HD who was found to have two L renal masses. She underwent L lap nephrectomy on 1/12/16. 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 Margins were negative.  Shehealed from surgery well, but then developed a large ovarian mass.  This was removed by gyn along with multiple sites of metastatic disease in the pelvis.  Path was c/w recurrence of RCC.     11/20/16 Pelvic ultrasound reveals "Large heterogeneous cystic and solid mass which appears to arise from the right ovary with worrisome imaging features for malignancy.  Differential diagnosis includes malignant tumors such as serous or mucinous cystadenocarcinoma.  It is important to note that the patient is at risk for ovarian torsion due to the size of the mass.Multiple uterine fibroids are identified with the largest in the uterine fundus."    11/22/16 pathology reveals "FINAL PATHOLOGIC DIAGNOSIS-RIGHT OVARY AND FALLOPIAN TUBE, RIGHT SALPINGO-OOPHORECTOMY:  -Positive for malignancy, high grade carcinoma morphologically and immunohistochemically consistent with metastasis from the patient's known renal cell carcinoma"    1/23/17 MRI abdomen " "reveals "Status post left nephrectomy, the left nephrectomy bed appears normal.There is a small liver lesion and a small splenic lesion, they cannot be fully characterized but they are unchanged from 5/27/16 suggestive of benign lesions.Multiple right kidney cysts.Cholelithiasis.Soft tissue oval density noted within the mesentery adjacent to the aorta right iliac bifurcation, nonspecific, could represent an enlarged lymph node, it is unchanged from prior studies"    1/23/17- CT Chest reveals "Stable pulmonary nodules, likely benign. No new pulmonary parenchymal abnormalities are identified. Enlarging nodular foci in partially visualized left upper abdomen. Peritoneal implants not excluded. Multiple thyroid nodules, measuring up to 1.6 cm."    4/5/17-MRI abdomen reveals "Again seen are the T2 hyperintense hepatic and splenic lesions that appear similar to prior exam and possibly represents hemangiomas. There are right renal cysts and the patient is status post left nephrectomy."    7/3/17- MRI abdomen "Left nephrectomy.  Right renal cysts with no malignant features.Liver and splenic lesions consistent with cysts versus hemangiomas.Cholelithiasis.Right pleural effusion and right basal atelectasis.Small pericardial effusion.Body wall edema and mesenteric edema."  7/3/17- CT chest "In this patient with a history of left renal cell carcinoma, there are 2 right lung nodules which are stable compared to January 2017.  2 left lung nodules seen on that study are not well-seen today, may be related to a small amount of pleural fluid.Bilateral pleural effusions, mildly decreased compared to prior study.Small amount of pericardial fluid, new."    Review of Systems   Constitutional: Positive for appetite change and fatigue. Negative for activity change, chills and fever.   HENT: Negative for congestion, hearing loss, mouth sores, sore throat, tinnitus and voice change.    Eyes: Negative for pain and visual disturbance. "   Respiratory: Positive for shortness of breath. Negative for cough and wheezing.    Cardiovascular: Negative for chest pain, palpitations and leg swelling.   Gastrointestinal: Negative for abdominal pain, constipation, diarrhea, nausea and vomiting.   Endocrine: Negative for cold intolerance and heat intolerance.   Genitourinary: Negative for difficulty urinating, dyspareunia, dysuria, frequency, menstrual problem, urgency, vaginal bleeding, vaginal discharge and vaginal pain.   Musculoskeletal: Negative for arthralgias and myalgias.   Skin: Negative for color change, rash and wound.   Allergic/Immunologic: Negative for environmental allergies and food allergies.   Neurological: Negative for weakness, numbness and headaches.   Hematological: Negative for adenopathy. Does not bruise/bleed easily.   Psychiatric/Behavioral: Negative for agitation, confusion, hallucinations and sleep disturbance. The patient is not nervous/anxious.    All other systems reviewed and are negative.      Allergies:  Review of patient's allergies indicates:   Allergen Reactions    Allopurinol      Other reaction(s): abnormal transaminases       Medications:  Current Outpatient Prescriptions   Medication Sig Dispense Refill    acetaminophen (TYLENOL) 500 MG tablet Take 500 mg by mouth every 6 (six) hours as needed for Pain.      aluminum-magnesium hydroxide-simethicone (MAALOX) 200-200-20 mg/5 mL Susp Take 30 mLs by mouth every 6 (six) hours as needed.       amlodipine (NORVASC) 5 MG tablet Take 1 tablet (5 mg total) by mouth once daily. 90 tablet 0    cabozantinib 60 mg Tab Take 60 mg by mouth once daily. 30 tablet 5    febuxostat (ULORIC) 40 mg Tab Take 1 tablet (40 mg total) by mouth once daily. 30 tablet 4    furosemide (LASIX) 40 MG tablet Take 1 tablet (40 mg total) by mouth daily as needed (weight gain of 3 lbs in 24 hrs or 5 lbs in 1 week). 30 tablet 11    hydrALAZINE (APRESOLINE) 100 MG tablet Take 1 tablet (100 mg total) by  mouth every 12 (twelve) hours. 90 tablet 3    isosorbide mononitrate (IMDUR) 30 MG 24 hr tablet Take 1 tablet (30 mg total) by mouth once daily. 30 tablet 11    losartan (COZAAR) 100 MG tablet Take 1 tablet (100 mg total) by mouth once daily. 30 tablet 5    metoprolol succinate (TOPROL-XL) 50 MG 24 hr tablet Take 1 tablet (50 mg total) by mouth once daily. 90 tablet 0    multivitamin-Ca-iron-minerals (ONE-A-DAY WOMENS FORMULA) 27-0.4 mg Tab Take by mouth. 1 Tablet Oral Every day      ondansetron (ZOFRAN-ODT) 8 MG TbDL Take 1 tablet (8 mg total) by mouth every 12 (twelve) hours as needed. 30 tablet 1    psyllium husk-calcium (METAMUCIL PLUS CALCIUM) 1-60 gram-mg Cap Take by mouth. 2 Capsule Oral Every morning      sevelamer HCl (RENAGEL) 800 MG Tab Take 2 tablets (1,600 mg total) by mouth 3 (three) times daily with meals. 180 tablet 11    trazodone (DESYREL) 50 MG tablet Take 1 tablet (50 mg total) by mouth every evening. 30 tablet 11     No current facility-administered medications for this visit.        PMH:  Past Medical History:   Diagnosis Date    Anemia     Bronchitis 03/01/2017    Cancer 2016    kidney cancer    CMV (cytomegalovirus) antibody positive     Essential hypertension 9/23/2015    H/O herpes simplex type 2 infection     Herpes simplex type 1 antibody positive     History of kidney cancer     s/p left nephrectomy 1/2016    Hyperparathyroidism, unspecified     Hyperuricemia without signs of inflammatory arthritis and tophaceous disease     Kidney stones     LGSIL (low grade squamous intraepithelial dysplasia)     Prediabetes     Proteinuria     Renal disorder     Thyroid nodule     Urate nephropathy        PSH:  Past Surgical History:   Procedure Laterality Date    BREAST CYST EXCISION      COLONOSCOPY N/A 11/12/2015    NEPHRECTOMY-LAPAROSCOPIC Left 01/12/2016    PERITONEAL CATHETER INSERTION      Permacath insertion  01/12/2017    SALPINGOOPHORECTOMY Right 2016     [facility]---DAVINCI    TONSILLECTOMY      TUBAL LIGATION         FamHx:  Family History   Problem Relation Age of Onset    Hypertension Mother     Diabetes Father     Kidney disease Father     Diabetes Maternal Grandfather     No Known Problems Sister     No Known Problems Brother     Heart disease Sister     No Known Problems Sister     No Known Problems Brother     Breast cancer Neg Hx     Colon cancer Neg Hx     Cancer Neg Hx     Stroke Neg Hx        SocHx:  Social History     Social History    Marital status:      Spouse name: N/A    Number of children: 2    Years of education: N/A     Occupational History    [employer]     Social History Main Topics    Smoking status: Never Smoker    Smokeless tobacco: Never Used    Alcohol use No      Comment: . 2 children. works at [employer].    Drug use: No    Sexual activity: Yes     Partners: Male     Other Topics Concern    Not on file     Social History Narrative    No narrative on file         Objective:      Physical Exam   Constitutional: She is oriented to person, place, and time. She appears well-developed and well-nourished. No distress.   HENT:   Head: Normocephalic and atraumatic.   Nose: Nose normal.   Mouth/Throat: Oropharynx is clear and moist. No oropharyngeal exudate.   Eyes: Conjunctivae and EOM are normal. Pupils are equal, round, and reactive to light. Right eye exhibits no discharge. Left eye exhibits no discharge.   Neck: Normal range of motion. Neck supple. No tracheal deviation present.   Cardiovascular: Normal rate and regular rhythm.  Exam reveals no gallop and no friction rub.    Murmur heard.  Pulmonary/Chest: Effort normal and breath sounds normal. No respiratory distress. She has no wheezes. She has no rales.   Abdominal: Soft. Bowel sounds are normal. She exhibits no distension. There is no tenderness. There is no rebound and no guarding.   Genitourinary: No breast tenderness, discharge or  bleeding.   Musculoskeletal: Normal range of motion. She exhibits no edema or tenderness.   Neurological: She is alert and oriented to person, place, and time. She has normal reflexes. No cranial nerve deficit. Coordination normal.   Skin: Skin is warm and dry. No rash noted. She is not diaphoretic. No erythema. No pallor.   Psychiatric: She has a normal mood and affect. Her behavior is normal. Thought content normal.   Nursing note and vitals reviewed.      LABS:  WBC   Date Value Ref Range Status   11/06/2017 4.71 3.90 - 12.70 K/uL Final     Hemoglobin   Date Value Ref Range Status   11/06/2017 11.0 (L) 12.0 - 16.0 g/dL Final     Hematocrit   Date Value Ref Range Status   11/06/2017 34.6 (L) 37.0 - 48.5 % Final     Platelets   Date Value Ref Range Status   11/06/2017 105 (L) 150 - 350 K/uL Final       Chemistry        Component Value Date/Time     11/06/2017 0859    K 4.1 11/06/2017 0859     11/06/2017 0859    CO2 25 11/06/2017 0859    BUN 49 (H) 11/06/2017 0859    CREATININE 8.6 (H) 11/06/2017 0859    GLU 97 11/06/2017 0859        Component Value Date/Time    CALCIUM 9.3 11/06/2017 0859    ALKPHOS 110 11/06/2017 0859    AST 31 11/06/2017 0859    ALT 29 11/06/2017 0859    BILITOT 0.8 11/06/2017 0859            Assessment:       1. Metastatic renal cell carcinoma, left    2. Malignant neoplasm metastatic to peritoneum    3. History of right oophorectomy    4. S/p nephrectomy left    5. Encounter for chemotherapy management    6. Anemia in ESRD (end-stage renal disease)    7. ESRD on hemodialysis    8. Hypertensive kidney disease with chronic kidney disease, stage 1-4 or unspecified chronic kidney disease    9. Abnormal echocardiogram    10. Thyroid nodule    11. Chemotherapy-induced thrombocytopenia        Plan:     1,2,3,4,5- Metastatic Renal Cell Carcinoma:    - It is unlikely that surgery removed all sites of metastatic disease. Given that this is c/w her previous papillary disease, pt started on  oral cabozantinib 60mg daily (for dual VEGF and MET inhibition), especially given recent data that shows cabo is far superior to student in the first line setting.  If pt progresses on this regimen, will switch to nivolumab.     Restaging scans from 7/3/17 and 9/2017 show stable disease and pt tolerating cabozantinib relatively well. We will continue cabozantinib for now. Cytology from thoracentesis did not show malignant cells. Will plan to get restaging scans in 3 mos.     RTC in 6 weeks with (CBC,CMP), MRI abdomen, CT chest and to see me and Dr. Rivera    6- Mild, will monitor.    7,8,9- BP elevated today. Following with cardiology and PCP to manage.      10- Had thyroid US and biopsy last year, which was negative. Will monitor for now.    11- Mild, will monitor.    Patient is in agreement with the proposed treatment plan. All questions were answered to the patient's satisfaction. Pt knows to call clinic if anything is needed before the next clinic visit.    NAS Telles  Hematology and Medical Oncology

## 2017-11-06 ENCOUNTER — LAB VISIT (OUTPATIENT)
Dept: LAB | Facility: HOSPITAL | Age: 52
End: 2017-11-06
Attending: INTERNAL MEDICINE
Payer: COMMERCIAL

## 2017-11-06 ENCOUNTER — OFFICE VISIT (OUTPATIENT)
Dept: HEMATOLOGY/ONCOLOGY | Facility: CLINIC | Age: 52
End: 2017-11-06
Payer: COMMERCIAL

## 2017-11-06 VITALS
HEART RATE: 76 BPM | DIASTOLIC BLOOD PRESSURE: 91 MMHG | WEIGHT: 136.88 LBS | BODY MASS INDEX: 23.37 KG/M2 | TEMPERATURE: 98 F | HEIGHT: 64 IN | SYSTOLIC BLOOD PRESSURE: 170 MMHG | RESPIRATION RATE: 18 BRPM

## 2017-11-06 DIAGNOSIS — C64.2 METASTATIC RENAL CELL CARCINOMA, LEFT: Primary | ICD-10-CM

## 2017-11-06 DIAGNOSIS — N18.6 ESRD ON HEMODIALYSIS: ICD-10-CM

## 2017-11-06 DIAGNOSIS — N18.6 ANEMIA IN ESRD (END-STAGE RENAL DISEASE): ICD-10-CM

## 2017-11-06 DIAGNOSIS — I12.9 HYPERTENSIVE KIDNEY DISEASE WITH CHRONIC KIDNEY DISEASE, STAGE 1-4 OR UNSPECIFIED CHRONIC KIDNEY DISEASE: ICD-10-CM

## 2017-11-06 DIAGNOSIS — R93.1 ABNORMAL ECHOCARDIOGRAM: ICD-10-CM

## 2017-11-06 DIAGNOSIS — Z51.11 ENCOUNTER FOR CHEMOTHERAPY MANAGEMENT: ICD-10-CM

## 2017-11-06 DIAGNOSIS — Z99.2 ESRD ON HEMODIALYSIS: ICD-10-CM

## 2017-11-06 DIAGNOSIS — Z90.5 S/P NEPHRECTOMY: ICD-10-CM

## 2017-11-06 DIAGNOSIS — C64.2 MALIGNANT NEOPLASM OF LEFT KIDNEY: ICD-10-CM

## 2017-11-06 DIAGNOSIS — Z90.721 HISTORY OF RIGHT OOPHORECTOMY: ICD-10-CM

## 2017-11-06 DIAGNOSIS — T45.1X5A CHEMOTHERAPY-INDUCED THROMBOCYTOPENIA: ICD-10-CM

## 2017-11-06 DIAGNOSIS — C78.6 MALIGNANT NEOPLASM METASTATIC TO PERITONEUM: ICD-10-CM

## 2017-11-06 DIAGNOSIS — D63.1 ANEMIA IN ESRD (END-STAGE RENAL DISEASE): ICD-10-CM

## 2017-11-06 DIAGNOSIS — D69.59 CHEMOTHERAPY-INDUCED THROMBOCYTOPENIA: ICD-10-CM

## 2017-11-06 DIAGNOSIS — E04.1 THYROID NODULE: ICD-10-CM

## 2017-11-06 LAB
ALBUMIN SERPL BCP-MCNC: 2.9 G/DL
ALP SERPL-CCNC: 110 U/L
ALT SERPL W/O P-5'-P-CCNC: 29 U/L
ANION GAP SERPL CALC-SCNC: 11 MMOL/L
AST SERPL-CCNC: 31 U/L
BASOPHILS # BLD AUTO: 0.06 K/UL
BASOPHILS NFR BLD: 1.3 %
BILIRUB SERPL-MCNC: 0.8 MG/DL
BUN SERPL-MCNC: 49 MG/DL
CALCIUM SERPL-MCNC: 9.3 MG/DL
CHLORIDE SERPL-SCNC: 103 MMOL/L
CO2 SERPL-SCNC: 25 MMOL/L
CREAT SERPL-MCNC: 8.6 MG/DL
DIFFERENTIAL METHOD: ABNORMAL
EOSINOPHIL # BLD AUTO: 0.2 K/UL
EOSINOPHIL NFR BLD: 4 %
ERYTHROCYTE [DISTWIDTH] IN BLOOD BY AUTOMATED COUNT: 15.6 %
EST. GFR  (AFRICAN AMERICAN): 5.5 ML/MIN/1.73 M^2
EST. GFR  (NON AFRICAN AMERICAN): 4.8 ML/MIN/1.73 M^2
GLUCOSE SERPL-MCNC: 97 MG/DL
HCT VFR BLD AUTO: 34.6 %
HGB BLD-MCNC: 11 G/DL
IMM GRANULOCYTES # BLD AUTO: 0.02 K/UL
IMM GRANULOCYTES NFR BLD AUTO: 0.4 %
LYMPHOCYTES # BLD AUTO: 1.5 K/UL
LYMPHOCYTES NFR BLD: 32.5 %
MCH RBC QN AUTO: 32 PG
MCHC RBC AUTO-ENTMCNC: 31.8 G/DL
MCV RBC AUTO: 101 FL
MONOCYTES # BLD AUTO: 0.5 K/UL
MONOCYTES NFR BLD: 9.8 %
NEUTROPHILS # BLD AUTO: 2.5 K/UL
NEUTROPHILS NFR BLD: 52 %
NRBC BLD-RTO: 0 /100 WBC
PLATELET # BLD AUTO: 105 K/UL
PMV BLD AUTO: 11.7 FL
POTASSIUM SERPL-SCNC: 4.1 MMOL/L
PROT SERPL-MCNC: 6.5 G/DL
RBC # BLD AUTO: 3.44 M/UL
SODIUM SERPL-SCNC: 139 MMOL/L
WBC # BLD AUTO: 4.71 K/UL

## 2017-11-06 PROCEDURE — 85025 COMPLETE CBC W/AUTO DIFF WBC: CPT

## 2017-11-06 PROCEDURE — 36415 COLL VENOUS BLD VENIPUNCTURE: CPT

## 2017-11-06 PROCEDURE — 99214 OFFICE O/P EST MOD 30 MIN: CPT | Mod: S$GLB,,, | Performed by: NURSE PRACTITIONER

## 2017-11-06 PROCEDURE — 80053 COMPREHEN METABOLIC PANEL: CPT

## 2017-11-06 PROCEDURE — 99999 PR PBB SHADOW E&M-EST. PATIENT-LVL IV: CPT | Mod: PBBFAC,,, | Performed by: NURSE PRACTITIONER

## 2017-11-13 ENCOUNTER — OFFICE VISIT (OUTPATIENT)
Dept: FAMILY MEDICINE | Facility: CLINIC | Age: 52
End: 2017-11-13
Payer: COMMERCIAL

## 2017-11-13 VITALS
RESPIRATION RATE: 18 BRPM | TEMPERATURE: 99 F | BODY MASS INDEX: 23.07 KG/M2 | HEIGHT: 64 IN | DIASTOLIC BLOOD PRESSURE: 82 MMHG | WEIGHT: 135.13 LBS | OXYGEN SATURATION: 97 % | HEART RATE: 76 BPM | SYSTOLIC BLOOD PRESSURE: 148 MMHG

## 2017-11-13 DIAGNOSIS — Z01.419 ENCOUNTER FOR GYNECOLOGICAL EXAMINATION WITHOUT ABNORMAL FINDING: ICD-10-CM

## 2017-11-13 DIAGNOSIS — Z12.4 SCREENING FOR CERVICAL CANCER: ICD-10-CM

## 2017-11-13 DIAGNOSIS — Z00.00 ROUTINE MEDICAL EXAM: Primary | ICD-10-CM

## 2017-11-13 DIAGNOSIS — I10 ESSENTIAL HYPERTENSION: ICD-10-CM

## 2017-11-13 PROCEDURE — 99396 PREV VISIT EST AGE 40-64: CPT | Mod: S$GLB,,, | Performed by: INTERNAL MEDICINE

## 2017-11-13 PROCEDURE — 99999 PR PBB SHADOW E&M-EST. PATIENT-LVL IV: CPT | Mod: PBBFAC,,, | Performed by: INTERNAL MEDICINE

## 2017-11-13 PROCEDURE — 88175 CYTOPATH C/V AUTO FLUID REDO: CPT

## 2017-11-13 NOTE — PROGRESS NOTES
HISTORY OF PRESENT ILLNESS:  Eva Bloom is a 52 y.o. female who presents to the clinic today for a routine physical exam. Her last physical exam was more than a year ago.    Contraception: bilateral tubal ligation      PAST MEDICAL HISTORY:  Past Medical History:   Diagnosis Date    Anemia     Bronchitis 03/01/2017    Cancer 2016    kidney cancer    CMV (cytomegalovirus) antibody positive     Essential hypertension 9/23/2015    H/O herpes simplex type 2 infection     Herpes simplex type 1 antibody positive     History of kidney cancer     s/p left nephrectomy 1/2016    Hyperparathyroidism, unspecified     Hyperuricemia without signs of inflammatory arthritis and tophaceous disease     Kidney stones     LGSIL (low grade squamous intraepithelial dysplasia)     Prediabetes     Proteinuria     Renal disorder     Thyroid nodule     Urate nephropathy        PAST SURGICAL HISTORY:  Past Surgical History:   Procedure Laterality Date    BREAST CYST EXCISION      COLONOSCOPY N/A 11/12/2015    NEPHRECTOMY-LAPAROSCOPIC Left 01/12/2016    PERITONEAL CATHETER INSERTION      Permacath insertion  01/12/2017    SALPINGOOPHORECTOMY Right 2016    [facility]---DAVINCI    TONSILLECTOMY      TUBAL LIGATION         SOCIAL HISTORY:  Social History     Social History    Marital status:      Spouse name: N/A    Number of children: 2    Years of education: N/A     Occupational History    [employer] #911     Social History Main Topics    Smoking status: Never Smoker    Smokeless tobacco: Never Used    Alcohol use No      Comment: . 2 children. works at [employer].    Drug use: No    Sexual activity: Yes     Partners: Male     Other Topics Concern    Not on file     Social History Narrative    No narrative on file       FAMILY HISTORY:  Family History   Problem Relation Age of Onset    Hypertension Mother     Diabetes Father     Kidney disease Father     Diabetes Maternal Grandfather      No Known Problems Sister     No Known Problems Brother     Heart disease Sister     No Known Problems Sister     No Known Problems Brother     Breast cancer Neg Hx     Colon cancer Neg Hx     Cancer Neg Hx     Stroke Neg Hx        ALLERGIES AND MEDICATIONS: updated and reviewed.  Review of patient's allergies indicates:   Allergen Reactions    Coreg [carvedilol] Other (See Comments)     Nausea/vomiting    Allopurinol      Other reaction(s): abnormal transaminases     Medication List with Changes/Refills   Current Medications    ACETAMINOPHEN (TYLENOL) 500 MG TABLET    Take 500 mg by mouth every 6 (six) hours as needed for Pain.    ALUMINUM-MAGNESIUM HYDROXIDE-SIMETHICONE (MAALOX) 200-200-20 MG/5 ML SUSP    Take 30 mLs by mouth every 6 (six) hours as needed.     AMLODIPINE (NORVASC) 5 MG TABLET    Take 1 tablet (5 mg total) by mouth once daily.    CABOZANTINIB 60 MG TAB    Take 60 mg by mouth once daily.    FEBUXOSTAT (ULORIC) 40 MG TAB    Take 1 tablet (40 mg total) by mouth once daily.    FUROSEMIDE (LASIX) 40 MG TABLET    Take 1 tablet (40 mg total) by mouth daily as needed (weight gain of 3 lbs in 24 hrs or 5 lbs in 1 week).    HYDRALAZINE (APRESOLINE) 100 MG TABLET    Take 1 tablet (100 mg total) by mouth every 12 (twelve) hours.    ISOSORBIDE MONONITRATE (IMDUR) 30 MG 24 HR TABLET    Take 1 tablet (30 mg total) by mouth once daily.    LOSARTAN (COZAAR) 100 MG TABLET    Take 1 tablet (100 mg total) by mouth once daily.    METOPROLOL SUCCINATE (TOPROL-XL) 50 MG 24 HR TABLET    Take 1 tablet (50 mg total) by mouth once daily.    MULTIVITAMIN-CA-IRON-MINERALS (ONE-A-DAY WOMENS FORMULA) 27-0.4 MG TAB    Take by mouth. 1 Tablet Oral Every day    ONDANSETRON (ZOFRAN-ODT) 8 MG TBDL    Take 1 tablet (8 mg total) by mouth every 12 (twelve) hours as needed.    PSYLLIUM HUSK-CALCIUM (METAMUCIL PLUS CALCIUM) 1-60 GRAM-MG CAP    Take by mouth. 2 Capsule Oral Every morning    SEVELAMER HCL (RENAGEL) 800 MG TAB    " Take 2 tablets (1,600 mg total) by mouth 3 (three) times daily with meals.    TRAZODONE (DESYREL) 50 MG TABLET    Take 1 tablet (50 mg total) by mouth every evening.         CARE TEAM:  Patient Care Team:  Sowmya Mccain MD as PCP - General (Internal Medicine)  Ben Rivera MD as Consulting Physician (Hematology and Oncology)  Leora Louis MD (Cardiology)           SCREENING HISTORY:  Health Maintenance       Date Due Completion Date    Pap Smear 10/07/2017 10/7/2016    Mammogram 10/02/2018 10/2/2017    Lipid Panel 09/08/2021 9/8/2016    Pneumococcal PPSV23 (High Risk) (2) 11/09/2021 11/9/2016    TETANUS VACCINE 09/23/2025 9/23/2015    Colonoscopy 11/12/2025 11/12/2015            REVIEW OF SYSTEMS:   The patient reports good dietary habits.  The patient does not exercise regularly.  Review of Systems   Constitutional: Negative for chills, fever and unexpected weight change.   Eyes: Negative.    Respiratory: Negative.    Cardiovascular: Negative.    Gastrointestinal: Negative for abdominal pain, blood in stool, constipation, diarrhea, nausea and vomiting.   Endocrine: Negative for diabetes, hair loss, hot flashes and hypothyroidism.   Genitourinary: Negative for dysuria, frequency and hematuria.   Musculoskeletal: Negative for back pain and joint swelling.   Skin:  Negative.   Neurological: Negative for seizures, numbness and headaches.   Psychiatric/Behavioral: Negative for depression and sleep disturbance. The patient is not nervous/anxious.    Breast: negative.          Physical Examination:   Vitals:    11/13/17 0813   BP: (!) 148/82   Pulse: 76   Resp: 18   Temp: 98.8 °F (37.1 °C)     Weight: 61.3 kg (135 lb 2.3 oz)   Height: 5' 4" (162.6 cm)   Body mass index is 23.2 kg/m².    General appearance - alert, well appearing, and in no distress and normal appearing weight  Mental status - alert, oriented to person, place, and time, normal mood, behavior, speech, dress, motor activity, and thought " processes  Eyes - pupils equal and reactive, extraocular eye movements intact, sclera anicteric  Mouth - mucous membranes moist, pharynx normal without lesions  Neck - supple, no significant adenopathy, carotids upstroke normal bilaterally, no bruits, thyroid exam: thyroid is normal in size without nodules or tenderness  Lymphatics - no palpable lymphadenopathy  Chest - clear to auscultation, no wheezes, rales or rhonchi, symmetric air entry  Heart - normal rate and regular rhythm, no gallops noted  Abdomen - soft, nontender, nondistended, no masses or organomegaly  Pelvic - normal external genitalia, vulva, vagina, cervix, uterus and adnexa  Neurological - alert, oriented, normal speech, no focal findings or movement disorder noted, cranial nerves II through XII intact  Musculoskeletal - no muscular tenderness noted  Extremities - no pedal edema noted, intact peripheral pulses  Skin - normal coloration and turgor, no rashes, no suspicious skin lesions noted      ASSESSMENT AND PLAN:  1. Routine medical exam  Counseled on age appropriate medical preventative services including age appropriate cancer screenings, age appropriate eye and dental exams, over all nutritional health, need for a consistent exercise regimen, and an over all push towards maintaining a vigorous and active lifestyle.  Counseled on age appropriate vaccines and discussed upcoming health care needs based on age/gender. Discussed good sleep hygiene and stress management.     2. Encounter for gynecological examination without abnormal finding/3. Screening for cervical cancer  I did a PAP smear today.  - Liquid-based pap smear, screening    4. Essential hypertension  Blood pressure is not controlled today. We discussed low salt diet and regular exercise. Patient reports that they have not taken any decongestant or anti-inflammatory medication recently (patient educated that these medications can increase blood pressure).  Medication changes were not  made today. Patient advised to enroll in the digital hypertension program.            Return in about 6 months (around 5/13/2018), or if symptoms worsen or fail to improve, for follow up chronic medical conditions.. or sooner as needed.

## 2017-11-29 ENCOUNTER — TELEPHONE (OUTPATIENT)
Dept: HEMATOLOGY/ONCOLOGY | Facility: CLINIC | Age: 52
End: 2017-11-29

## 2017-11-29 DIAGNOSIS — C64.9 RENAL CELL CARCINOMA, UNSPECIFIED LATERALITY: ICD-10-CM

## 2017-11-29 NOTE — TELEPHONE ENCOUNTER
Spoke to representative, since this is a new patient a new prescription needs to be faxed over. Fax number is [phone].

## 2017-11-29 NOTE — TELEPHONE ENCOUNTER
----- Message from Shauna Kumar sent at 11/29/2017  3:26 PM CST -----  Contact: Kristina [facility] [phone]  Kristina with [facility] is requesting a call back to get an intial authorization for the following:    Liliana Acevedo states this is a transfer from [facility].    Kristina may be reached at [phone].    Thank you.  LC

## 2017-11-30 ENCOUNTER — TELEPHONE (OUTPATIENT)
Dept: HEMATOLOGY/ONCOLOGY | Facility: CLINIC | Age: 52
End: 2017-11-30

## 2017-11-30 DIAGNOSIS — C64.2 METASTATIC RENAL CELL CARCINOMA, LEFT: Primary | ICD-10-CM

## 2017-11-30 NOTE — TELEPHONE ENCOUNTER
----- Message from Christiane Brown sent at 11/30/2017 10:05 AM CST -----  Contact: DAQUAN Gabriel calling regarding medication cabozantinib. He needs clarification on which medication you want the pt to have. He states it comes two different ways    Harvey call back number [phone]

## 2017-12-01 ENCOUNTER — TELEPHONE (OUTPATIENT)
Dept: HEMATOLOGY/ONCOLOGY | Facility: CLINIC | Age: 52
End: 2017-12-01

## 2017-12-01 DIAGNOSIS — C64.9 RENAL CELL CARCINOMA, UNSPECIFIED LATERALITY: ICD-10-CM

## 2017-12-01 NOTE — TELEPHONE ENCOUNTER
Spoke to Lenin, let him know we received another denial letter and we are trying for an appeal. Lenin will call back Monday to check on status of appeal

## 2017-12-01 NOTE — TELEPHONE ENCOUNTER
----- Message from Dilcia Barton sent at 12/1/2017 12:52 PM CST -----  Contact: Lenin [facility]  Mr. Phelps is calling to speak with Staff regarding a new prescription for Capbometyx, 60 Mg Tablet.  Insurance requires a prior authorization.  Mr. Phelps sent one yesterday and wants to make sure you received it.    He can be reached at [phone].    Thank you.

## 2017-12-04 ENCOUNTER — TELEPHONE (OUTPATIENT)
Dept: PHARMACY | Facility: CLINIC | Age: 52
End: 2017-12-04

## 2017-12-04 NOTE — LETTER
[facility]  [address]  [facility] [address]  Phone: [phone]  Fax: [phone]    2017   [facility]  Research and Correspondence Department       Fax: [phone]    Regarding:  Roxanne Bloom    ID:   [id number]  :  1965  PA#: [facility] [id number]     URGENT REQUEST    Greetings:  My nurse practitioner, Liset Ngo, and I have recently requested authorization to continue treatment of my patient, Roxanne Bloom, with cabozantinib (Cabometyx) for her metastatic renal cell carcinoma (mRCC).  Unfortunately, the approval of cabozantinib has been denied because plan requires prior antiangiogenic therapy and the presence of clear cell histology.    Ms. Bloom is a 52 year old female with ESRD on HD who was found to have two left renal masses. She underwent left lap nephrectomy on 16. 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 Margins were negative.  She healed from surgery well, but then developed a large ovarian mass.  This was removed by gynecology along with multiple sites of metastatic disease in the pelvis.  Pathology was consistent with recurrence of RCC.  Ms. Bloom began cabozantinib treatment in 2016 for her papillary type II disease for dual VEGF and MET inhibition.  Restaging scans from 2017 and 2017 show stable disease and she has been tolerating cabozantinib relatively well.     Data from a recent phase II trial demonstrates that cabozantinib has a significant clinical benefit in PFS and KURTZ over standard-of-care sunitinib as first-line therapy in patients with intermediate- or poor-risk mRCC.  Compared with sunitinib, cabozantinib treatment significantly increased median PFS (8.2 v  5.6 months) and was  associated with a 34% reduction in rate of progression or death (adjusted hazard ratio, 0.66; 95% CI, 0.46 to 0.95; one-sided P = .012). KURTZ was 33% (95% CI, 23% to 44%) for cabozantinib versus 12% (95% CI, 5.4% to 21%) for sunitinib. (J Clin Oncol 35:591-597).  NCCN has recently added cabozantinib as a category 2a recommended treatment option for patient with non-clear cell carcinoma.    At this time, Ms. Bloom and I ask that you reconsider and authorize the continued use of cabozantinib.  Although I appreciate the significant cost of the drug, the treatment is medically necessary to prevent further progression, prolonging her life. Ms. Bloom and I eagerly await your response.      Yours truly,      Ben Rivera M.D., M.S., F.A.C.P.  Hematology and Oncology Attending  [facility]

## 2017-12-07 NOTE — TELEPHONE ENCOUNTER
PA DENIED: Plan requires clear cell histology and previous use of VEGFR inhibitor.     Urgent appeal faxed to: [facility]  Research and Correspondence Department    Fax: [phone]

## 2017-12-08 ENCOUNTER — TELEPHONE (OUTPATIENT)
Dept: HEMATOLOGY/ONCOLOGY | Facility: CLINIC | Age: 52
End: 2017-12-08

## 2017-12-08 ENCOUNTER — HOSPITAL ENCOUNTER (OUTPATIENT)
Dept: RADIOLOGY | Facility: HOSPITAL | Age: 52
Discharge: HOME | End: 2017-12-08
Attending: NURSE PRACTITIONER
Payer: COMMERCIAL

## 2017-12-08 DIAGNOSIS — Z90.721 HISTORY OF RIGHT OOPHORECTOMY: ICD-10-CM

## 2017-12-08 DIAGNOSIS — Z51.11 ENCOUNTER FOR CHEMOTHERAPY MANAGEMENT: ICD-10-CM

## 2017-12-08 DIAGNOSIS — I12.9 HYPERTENSIVE KIDNEY DISEASE WITH CHRONIC KIDNEY DISEASE, STAGE 1-4 OR UNSPECIFIED CHRONIC KIDNEY DISEASE: ICD-10-CM

## 2017-12-08 DIAGNOSIS — C64.2 METASTATIC RENAL CELL CARCINOMA, LEFT: ICD-10-CM

## 2017-12-08 DIAGNOSIS — Z99.2 ESRD ON HEMODIALYSIS: ICD-10-CM

## 2017-12-08 DIAGNOSIS — N18.6 ANEMIA IN ESRD (END-STAGE RENAL DISEASE): ICD-10-CM

## 2017-12-08 DIAGNOSIS — E04.1 THYROID NODULE: ICD-10-CM

## 2017-12-08 DIAGNOSIS — R93.1 ABNORMAL ECHOCARDIOGRAM: ICD-10-CM

## 2017-12-08 DIAGNOSIS — C78.6 MALIGNANT NEOPLASM METASTATIC TO PERITONEUM: ICD-10-CM

## 2017-12-08 DIAGNOSIS — N18.6 ESRD ON HEMODIALYSIS: ICD-10-CM

## 2017-12-08 DIAGNOSIS — D63.1 ANEMIA IN ESRD (END-STAGE RENAL DISEASE): ICD-10-CM

## 2017-12-08 DIAGNOSIS — D69.59 CHEMOTHERAPY-INDUCED THROMBOCYTOPENIA: ICD-10-CM

## 2017-12-08 DIAGNOSIS — T45.1X5A CHEMOTHERAPY-INDUCED THROMBOCYTOPENIA: ICD-10-CM

## 2017-12-08 DIAGNOSIS — Z90.5 S/P NEPHRECTOMY: ICD-10-CM

## 2017-12-08 PROCEDURE — 71250 CT THORAX DX C-: CPT | Mod: TC

## 2017-12-08 PROCEDURE — 74181 MRI ABDOMEN W/O CONTRAST: CPT | Mod: 26,,, | Performed by: RADIOLOGY

## 2017-12-08 PROCEDURE — 74181 MRI ABDOMEN W/O CONTRAST: CPT | Mod: TC

## 2017-12-08 PROCEDURE — 71250 CT THORAX DX C-: CPT | Mod: 26,,, | Performed by: RADIOLOGY

## 2017-12-08 NOTE — TELEPHONE ENCOUNTER
----- Message from Mercedes Valadez sent at 12/8/2017 12:16 PM CST -----  Contact: Lenin from [facility]  Lenin from [facility]    Says the initial prior authorization was denied and is checking the status of the appeal    cabozantinib 60mg Tab    Says he called on the 1st and yesterday and no one has gotten back if no word today the acct will be canceled     Lenin ghotra by reached at  [phone]    Thanks

## 2017-12-11 ENCOUNTER — TELEPHONE (OUTPATIENT)
Dept: HEMATOLOGY/ONCOLOGY | Facility: CLINIC | Age: 52
End: 2017-12-11

## 2017-12-11 NOTE — TELEPHONE ENCOUNTER
Patient returned call to [facility].  She has enough medication currently to make it through tomorrow.  We are attempting to see if we can get a bridge supply of meds through  until we hear a decision on the [place] appeal that was submitted.  Will f/u with patient as we know more.

## 2017-12-11 NOTE — TELEPHONE ENCOUNTER
----- Message from Conrado Sorto sent at 12/11/2017 12:17 PM CST -----  Contact: [facility]   Status update on appeal for medication cabometyx 16 mg     Contact::[phone]

## 2017-12-12 ENCOUNTER — TELEPHONE (OUTPATIENT)
Dept: HEMATOLOGY/ONCOLOGY | Facility: CLINIC | Age: 52
End: 2017-12-12

## 2017-12-12 NOTE — TELEPHONE ENCOUNTER
Spoke to [facility], the appeal may take up to 15 days. We are working to get her medication through the company

## 2017-12-12 NOTE — TELEPHONE ENCOUNTER
----- Message from Asad Farah, PharmSHERIF sent at 12/12/2017  1:33 PM CST -----  Contact: Lenin with [facility] pharmacy  The appeal has been received by her insurance and is in processing.  They stated that even an urgent appeal can take up to 15 days.  We are also working with patient to sign up for medication from .   will not dispense bridge supply as this is a continuation of therapy, but we are waiting on financials to enroll her in patient assistance program.  ----- Message -----  From: Gerber Butler RN  Sent: 12/12/2017   1:12 PM  To: Asad Farah PharmD    You have been helping with the approval for this medication. Any word on insurance approval yet?  ----- Message -----  From: Christiane Brown  Sent: 12/12/2017   1:04 PM  To: Miguel Phelps calling regarding cabozantinib. He wants to know if an appeal is being done to get medication approved or if he needs to cancel the order. If pharmacy does not receive a response by 12/14 they will cancel order      Lenin call back number [phone]

## 2017-12-13 LAB
DIASTOLIC DYSFUNCTION: YES
ESTIMATED PA SYSTOLIC PRESSURE: 40.21
GLOBAL PERICARDIAL EFFUSION: ABNORMAL
MITRAL VALVE REGURGITATION: ABNORMAL
RETIRED EF AND QEF - SEE NOTES: 35 (ref 55–65)
TRICUSPID VALVE REGURGITATION: ABNORMAL

## 2017-12-15 ENCOUNTER — TELEPHONE (OUTPATIENT)
Dept: HEMATOLOGY/ONCOLOGY | Facility: CLINIC | Age: 52
End: 2017-12-15

## 2017-12-17 NOTE — PROGRESS NOTES
"Subjective:       Patient ID: Eva Bloom is a 52 y.o. female.    Chief Complaint: RCC    HPI     52 year old female to clinic for 6 week follow up and to review labs for recurrent metastatic renal cell carcinoma. Pt is currently taking cabozantinib daily. Pt is on ESRD with HD every TTS. Patient tolerating treat extremely well and has no complaints today.     Today, she denies any mouth sores, nausea, vomiting, diarrhea, constipation,weight loss, chest pain, leg swelling, headache, dizziness, or mood changes.    ECOG 1. She is accompanied to clinic alone.    Oncologic History (From Chart and Patient):  Eva Bloom is a 52 y.o. female with ESRD on HD who was found to have two L renal masses. She underwent L lap nephrectomy on 1/12/16. 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 Margins were negative.  Shehealed from surgery well, but then developed a large ovarian mass.  This was removed by gyn along with multiple sites of metastatic disease in the pelvis.  Path was c/w recurrence of RCC.     11/20/16 Pelvic ultrasound reveals "Large heterogeneous cystic and solid mass which appears to arise from the right ovary with worrisome imaging features for malignancy.  Differential diagnosis includes malignant tumors such as serous or mucinous cystadenocarcinoma.  It is important to note that the patient is at risk for ovarian torsion due to the size of the mass.Multiple uterine fibroids are identified with the largest in the uterine fundus."    11/22/16 pathology reveals "FINAL PATHOLOGIC DIAGNOSIS-RIGHT OVARY AND FALLOPIAN TUBE, RIGHT SALPINGO-OOPHORECTOMY:  -Positive for malignancy, high grade carcinoma morphologically and immunohistochemically consistent with metastasis from the patient's known renal cell carcinoma"    1/23/17 MRI abdomen reveals "Status post left nephrectomy, the left " "nephrectomy bed appears normal.There is a small liver lesion and a small splenic lesion, they cannot be fully characterized but they are unchanged from 5/27/16 suggestive of benign lesions.Multiple right kidney cysts.Cholelithiasis.Soft tissue oval density noted within the mesentery adjacent to the aorta right iliac bifurcation, nonspecific, could represent an enlarged lymph node, it is unchanged from prior studies"    1/23/17- CT Chest reveals "Stable pulmonary nodules, likely benign. No new pulmonary parenchymal abnormalities are identified. Enlarging nodular foci in partially visualized left upper abdomen. Peritoneal implants not excluded. Multiple thyroid nodules, measuring up to 1.6 cm."    4/5/17-MRI abdomen reveals "Again seen are the T2 hyperintense hepatic and splenic lesions that appear similar to prior exam and possibly represents hemangiomas. There are right renal cysts and the patient is status post left nephrectomy."    7/3/17- MRI abdomen "Left nephrectomy.  Right renal cysts with no malignant features.Liver and splenic lesions consistent with cysts versus hemangiomas.Cholelithiasis.Right pleural effusion and right basal atelectasis.Small pericardial effusion.Body wall edema and mesenteric edema."  7/3/17- CT chest "In this patient with a history of left renal cell carcinoma, there are 2 right lung nodules which are stable compared to January 2017.  2 left lung nodules seen on that study are not well-seen today, may be related to a small amount of pleural fluid.Bilateral pleural effusions, mildly decreased compared to prior study.Small amount of pericardial fluid, new."    Review of Systems   Constitutional: Positive for appetite change and fatigue. Negative for activity change, chills and fever.   HENT: Negative for congestion, hearing loss, mouth sores, sore throat, tinnitus and voice change.    Eyes: Negative for pain and visual disturbance.   Respiratory: Positive for shortness of breath. Negative " for cough and wheezing.    Cardiovascular: Negative for chest pain, palpitations and leg swelling.   Gastrointestinal: Negative for abdominal pain, constipation, diarrhea, nausea and vomiting.   Endocrine: Negative for cold intolerance and heat intolerance.   Genitourinary: Negative for difficulty urinating, dyspareunia, dysuria, frequency, menstrual problem, urgency, vaginal bleeding, vaginal discharge and vaginal pain.   Musculoskeletal: Negative for arthralgias and myalgias.   Skin: Negative for color change, rash and wound.   Allergic/Immunologic: Negative for environmental allergies and food allergies.   Neurological: Negative for weakness, numbness and headaches.   Hematological: Negative for adenopathy. Does not bruise/bleed easily.   Psychiatric/Behavioral: Negative for agitation, confusion, hallucinations and sleep disturbance. The patient is not nervous/anxious.    All other systems reviewed and are negative.      Allergies:  Review of patient's allergies indicates:   Allergen Reactions    Allopurinol      Other reaction(s): abnormal transaminases       Medications:  Current Outpatient Prescriptions   Medication Sig Dispense Refill    acetaminophen (TYLENOL) 500 MG tablet Take 500 mg by mouth every 6 (six) hours as needed for Pain.      aluminum-magnesium hydroxide-simethicone (MAALOX) 200-200-20 mg/5 mL Susp Take 30 mLs by mouth every 6 (six) hours as needed.       amlodipine (NORVASC) 5 MG tablet Take 1 tablet (5 mg total) by mouth once daily. 90 tablet 0    cabozantinib 60 mg Tab Take 60 mg by mouth once daily. 30 tablet 11    febuxostat (ULORIC) 40 mg Tab Take 1 tablet (40 mg total) by mouth once daily. 30 tablet 4    furosemide (LASIX) 40 MG tablet Take 1 tablet (40 mg total) by mouth daily as needed (weight gain of 3 lbs in 24 hrs or 5 lbs in 1 week). 30 tablet 11    hydrALAZINE (APRESOLINE) 100 MG tablet Take 1 tablet (100 mg total) by mouth every 12 (twelve) hours. 90 tablet 3     isosorbide mononitrate (IMDUR) 30 MG 24 hr tablet Take 1 tablet (30 mg total) by mouth once daily. 30 tablet 11    losartan (COZAAR) 100 MG tablet Take 1 tablet (100 mg total) by mouth once daily. 30 tablet 5    metoprolol succinate (TOPROL-XL) 50 MG 24 hr tablet Take 1 tablet (50 mg total) by mouth once daily. 90 tablet 0    multivitamin-Ca-iron-minerals (ONE-A-DAY WOMENS FORMULA) 27-0.4 mg Tab Take by mouth. 1 Tablet Oral Every day      ondansetron (ZOFRAN-ODT) 8 MG TbDL Take 1 tablet (8 mg total) by mouth every 12 (twelve) hours as needed. 30 tablet 1    psyllium husk-calcium (METAMUCIL PLUS CALCIUM) 1-60 gram-mg Cap Take by mouth. 2 Capsule Oral Every morning      sevelamer HCl (RENAGEL) 800 MG Tab Take 2 tablets (1,600 mg total) by mouth 3 (three) times daily with meals. 180 tablet 11    trazodone (DESYREL) 50 MG tablet Take 1 tablet (50 mg total) by mouth every evening. 30 tablet 11     No current facility-administered medications for this visit.        PMH:  Past Medical History:   Diagnosis Date    Anemia     Bronchitis 03/01/2017    Cancer 2016    kidney cancer    CMV (cytomegalovirus) antibody positive     Essential hypertension 9/23/2015    H/O herpes simplex type 2 infection     Herpes simplex type 1 antibody positive     History of kidney cancer     s/p left nephrectomy 1/2016    Hyperparathyroidism, unspecified     Hyperuricemia without signs of inflammatory arthritis and tophaceous disease     Kidney stones     LGSIL (low grade squamous intraepithelial dysplasia)     Prediabetes     Proteinuria     Renal disorder     Thyroid nodule     Urate nephropathy        PSH:  Past Surgical History:   Procedure Laterality Date    BREAST CYST EXCISION      COLONOSCOPY N/A 11/12/2015    NEPHRECTOMY-LAPAROSCOPIC Left 01/12/2016    PERITONEAL CATHETER INSERTION      Permacath insertion  01/12/2017    SALPINGOOPHORECTOMY Right 2016    KJB---DAVINCI    TONSILLECTOMY      TUBAL LIGATION          FamHx:  Family History   Problem Relation Age of Onset    Hypertension Mother     Diabetes Father     Kidney disease Father     Diabetes Maternal Grandfather     No Known Problems Sister     No Known Problems Brother     Heart disease Sister     No Known Problems Sister     No Known Problems Brother     Breast cancer Neg Hx     Colon cancer Neg Hx     Cancer Neg Hx     Stroke Neg Hx        SocHx:  Social History     Social History    Marital status:      Spouse name: N/A    Number of children: 2    Years of education: N/A     Occupational History    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 2 children. works at [employer].    Drug use: No    Sexual activity: Yes     Partners: Male     Other Topics Concern    Not on file     Social History Narrative    No narrative on file         Objective:      Physical Exam   Constitutional: She is oriented to person, place, and time. She appears well-developed and well-nourished. No distress.   HENT:   Head: Normocephalic and atraumatic.   Nose: Nose normal.   Mouth/Throat: Oropharynx is clear and moist. No oropharyngeal exudate.   Eyes: Conjunctivae and EOM are normal. Pupils are equal, round, and reactive to light. Right eye exhibits no discharge. Left eye exhibits no discharge.   Neck: Normal range of motion. Neck supple. No tracheal deviation present.   Cardiovascular: Normal rate and regular rhythm.  Exam reveals no gallop and no friction rub.    Murmur heard.  Pulmonary/Chest: Effort normal and breath sounds normal. No respiratory distress. She has no wheezes. She has no rales.   Abdominal: Soft. Bowel sounds are normal. She exhibits no distension. There is no tenderness. There is no rebound and no guarding.   Genitourinary: No breast tenderness, discharge or bleeding.   Musculoskeletal: Normal range of motion. She  exhibits no edema or tenderness.   Neurological: She is alert and oriented to person, place, and time. She has normal reflexes. No cranial nerve deficit. Coordination normal.   Skin: Skin is warm and dry. No rash noted. She is not diaphoretic. No erythema. No pallor.   Psychiatric: She has a normal mood and affect. Her behavior is normal. Thought content normal.   Nursing note and vitals reviewed.      LABS:  WBC   Date Value Ref Range Status   11/06/2017 4.71 3.90 - 12.70 K/uL Final     Hemoglobin   Date Value Ref Range Status   11/06/2017 11.0 (L) 12.0 - 16.0 g/dL Final     Hematocrit   Date Value Ref Range Status   11/06/2017 34.6 (L) 37.0 - 48.5 % Final     Platelets   Date Value Ref Range Status   11/06/2017 105 (L) 150 - 350 K/uL Final       Chemistry        Component Value Date/Time     11/06/2017 0859    K 4.1 11/06/2017 0859     11/06/2017 0859    CO2 25 11/06/2017 0859    BUN 49 (H) 11/06/2017 0859    CREATININE 8.6 (H) 11/06/2017 0859    GLU 97 11/06/2017 0859        Component Value Date/Time    CALCIUM 9.3 11/06/2017 0859    ALKPHOS 110 11/06/2017 0859    AST 31 11/06/2017 0859    ALT 29 11/06/2017 0859    BILITOT 0.8 11/06/2017 0859            Assessment:       1. Metastatic renal cell carcinoma, left    2. Malignant neoplasm metastatic to peritoneum    3. History of right oophorectomy    4. S/p nephrectomy left    5. Encounter for chemotherapy management    6. Anemia in ESRD (end-stage renal disease)    7. ESRD on hemodialysis    8. Hypertensive kidney disease with chronic kidney disease, stage 1-4 or unspecified chronic kidney disease    9. Essential hypertension    10. Abnormal echocardiogram    11. Thyroid nodule        Plan:     1,2,3,4,5- Metastatic Renal Cell Carcinoma:    - It is unlikely that surgery removed all sites of metastatic disease. Given that this is c/w her previous papillary disease, pt started on oral cabozantinib 60mg daily (for dual VEGF and MET inhibition), especially  given recent data that shows cabo is far superior to student in the first line setting.  If pt progresses on this regimen, will switch to nivolumab.     Restaging scans from 7/3/17 and 9/2017 show stable disease and pt tolerating cabozantinib relatively well. We will continue cabozantinib for now. Cytology from thoracentesis did not show malignant cells.Restaging scans show extremely stable disease. Working on getting authorization for her cabozantinib for next year. Will plan to get restaging scans in 3 mos.     RTC in 6 weeks with (CBC,CMP) and to see me.    6- Mild, will monitor.    7,8,9,10- BP elevated today. Following with cardiology and PCP to manage.      11- Had thyroid US and biopsy last year, which was negative. Will monitor for now.        Patient was also seen and examined by Dr. Rivera. Patient is in agreement with the proposed treatment plan. All questions were answered to the patient's satisfaction. Pt knows to call clinic if anything is needed before the next clinic visit.    TONA Telles-RODRÍGUEZ  Hematology and Medical Oncology      I have reviewed the notes, assessments, and/or procedures performed by the nurse practitioner, as above.  I have personally interviewed the patient at the beside, and rounded with the nurse practitioner. I formulated the plan of care.  I concur with her documentation of Eva Bloom.  I, Dr. Ben Rivera, personally spent more than 25 mins during this encounter, greater than 50% was spent in direct counseling and/or coordination of care.     Ben Rivera M.D., M.S., F.A.C.P.  Hematology/Oncology Attending  [facility]

## 2017-12-18 ENCOUNTER — LAB VISIT (OUTPATIENT)
Dept: LAB | Facility: HOSPITAL | Age: 52
End: 2017-12-18
Attending: INTERNAL MEDICINE
Payer: COMMERCIAL

## 2017-12-18 ENCOUNTER — OFFICE VISIT (OUTPATIENT)
Dept: HEMATOLOGY/ONCOLOGY | Facility: CLINIC | Age: 52
End: 2017-12-18
Payer: COMMERCIAL

## 2017-12-18 VITALS
RESPIRATION RATE: 14 BRPM | SYSTOLIC BLOOD PRESSURE: 162 MMHG | TEMPERATURE: 98 F | HEART RATE: 71 BPM | DIASTOLIC BLOOD PRESSURE: 92 MMHG | WEIGHT: 137.38 LBS | BODY MASS INDEX: 23.58 KG/M2

## 2017-12-18 DIAGNOSIS — C64.9 RENAL CELL CARCINOMA, UNSPECIFIED LATERALITY: ICD-10-CM

## 2017-12-18 DIAGNOSIS — N18.6 ESRD ON HEMODIALYSIS: ICD-10-CM

## 2017-12-18 DIAGNOSIS — N18.6 ANEMIA IN ESRD (END-STAGE RENAL DISEASE): ICD-10-CM

## 2017-12-18 DIAGNOSIS — C78.6 MALIGNANT NEOPLASM METASTATIC TO PERITONEUM: ICD-10-CM

## 2017-12-18 DIAGNOSIS — R93.1 ABNORMAL ECHOCARDIOGRAM: ICD-10-CM

## 2017-12-18 DIAGNOSIS — Z90.5 S/P NEPHRECTOMY: ICD-10-CM

## 2017-12-18 DIAGNOSIS — C64.2 METASTATIC RENAL CELL CARCINOMA, LEFT: Primary | ICD-10-CM

## 2017-12-18 DIAGNOSIS — I10 ESSENTIAL HYPERTENSION: ICD-10-CM

## 2017-12-18 DIAGNOSIS — E04.1 THYROID NODULE: ICD-10-CM

## 2017-12-18 DIAGNOSIS — Z51.11 ENCOUNTER FOR CHEMOTHERAPY MANAGEMENT: ICD-10-CM

## 2017-12-18 DIAGNOSIS — C64.2 MALIGNANT NEOPLASM OF LEFT KIDNEY: ICD-10-CM

## 2017-12-18 DIAGNOSIS — Z90.721 HISTORY OF RIGHT OOPHORECTOMY: ICD-10-CM

## 2017-12-18 DIAGNOSIS — Z99.2 ESRD ON HEMODIALYSIS: ICD-10-CM

## 2017-12-18 DIAGNOSIS — I12.9 HYPERTENSIVE KIDNEY DISEASE WITH CHRONIC KIDNEY DISEASE, STAGE 1-4 OR UNSPECIFIED CHRONIC KIDNEY DISEASE: ICD-10-CM

## 2017-12-18 DIAGNOSIS — D63.1 ANEMIA IN ESRD (END-STAGE RENAL DISEASE): ICD-10-CM

## 2017-12-18 LAB
ALBUMIN SERPL BCP-MCNC: 2.9 G/DL
ALP SERPL-CCNC: 120 U/L
ALT SERPL W/O P-5'-P-CCNC: 27 U/L
ANION GAP SERPL CALC-SCNC: 11 MMOL/L
AST SERPL-CCNC: 27 U/L
BASOPHILS # BLD AUTO: 0.05 K/UL
BASOPHILS NFR BLD: 1.2 %
BILIRUB SERPL-MCNC: 0.8 MG/DL
BUN SERPL-MCNC: 51 MG/DL
CALCIUM SERPL-MCNC: 9.3 MG/DL
CHLORIDE SERPL-SCNC: 104 MMOL/L
CO2 SERPL-SCNC: 24 MMOL/L
CREAT SERPL-MCNC: 8.1 MG/DL
DIFFERENTIAL METHOD: ABNORMAL
EOSINOPHIL # BLD AUTO: 0.1 K/UL
EOSINOPHIL NFR BLD: 2.1 %
ERYTHROCYTE [DISTWIDTH] IN BLOOD BY AUTOMATED COUNT: 16.9 %
EST. GFR  (AFRICAN AMERICAN): 5.9 ML/MIN/1.73 M^2
EST. GFR  (NON AFRICAN AMERICAN): 5.1 ML/MIN/1.73 M^2
GLUCOSE SERPL-MCNC: 108 MG/DL
HCT VFR BLD AUTO: 36.7 %
HGB BLD-MCNC: 11.4 G/DL
IMM GRANULOCYTES # BLD AUTO: 0.03 K/UL
IMM GRANULOCYTES NFR BLD AUTO: 0.7 %
LYMPHOCYTES # BLD AUTO: 1.2 K/UL
LYMPHOCYTES NFR BLD: 28.4 %
MCH RBC QN AUTO: 32.1 PG
MCHC RBC AUTO-ENTMCNC: 31.1 G/DL
MCV RBC AUTO: 103 FL
MONOCYTES # BLD AUTO: 0.3 K/UL
MONOCYTES NFR BLD: 7.4 %
NEUTROPHILS # BLD AUTO: 2.6 K/UL
NEUTROPHILS NFR BLD: 60.2 %
NRBC BLD-RTO: 0 /100 WBC
PLATELET # BLD AUTO: 124 K/UL
PMV BLD AUTO: 11 FL
POTASSIUM SERPL-SCNC: 4 MMOL/L
PROT SERPL-MCNC: 6.5 G/DL
RBC # BLD AUTO: 3.55 M/UL
SODIUM SERPL-SCNC: 139 MMOL/L
WBC # BLD AUTO: 4.33 K/UL

## 2017-12-18 PROCEDURE — 80053 COMPREHEN METABOLIC PANEL: CPT

## 2017-12-18 PROCEDURE — 99214 OFFICE O/P EST MOD 30 MIN: CPT | Mod: S$GLB,,, | Performed by: INTERNAL MEDICINE

## 2017-12-18 PROCEDURE — 36415 COLL VENOUS BLD VENIPUNCTURE: CPT

## 2017-12-18 PROCEDURE — 85025 COMPLETE CBC W/AUTO DIFF WBC: CPT

## 2017-12-18 PROCEDURE — 99999 PR PBB SHADOW E&M-EST. PATIENT-LVL III: CPT | Mod: PBBFAC,,, | Performed by: INTERNAL MEDICINE

## 2017-12-18 RX ORDER — HYDRALAZINE HYDROCHLORIDE 100 MG/1
100 TABLET, FILM COATED ORAL EVERY 12 HOURS
Qty: 90 TABLET | Refills: 3 | Status: SHIPPED | OUTPATIENT
Start: 2017-12-18 | End: 2018-06-19 | Stop reason: SDUPTHER

## 2017-12-18 RX ORDER — METOPROLOL SUCCINATE 50 MG/1
50 TABLET, EXTENDED RELEASE ORAL DAILY
Qty: 90 TABLET | Refills: 0 | Status: SHIPPED | OUTPATIENT
Start: 2017-12-18 | End: 2017-12-26 | Stop reason: SDUPTHER

## 2017-12-18 RX ORDER — ISOSORBIDE MONONITRATE 30 MG/1
30 TABLET, EXTENDED RELEASE ORAL DAILY
Qty: 30 TABLET | Refills: 11 | Status: SHIPPED | OUTPATIENT
Start: 2017-12-18 | End: 2018-05-25 | Stop reason: SDUPTHER

## 2017-12-18 RX ORDER — AMLODIPINE BESYLATE 5 MG/1
5 TABLET ORAL DAILY
Qty: 90 TABLET | Refills: 0 | Status: SHIPPED | OUTPATIENT
Start: 2017-12-18 | End: 2018-03-29 | Stop reason: SDUPTHER

## 2017-12-18 RX ORDER — LOSARTAN POTASSIUM 100 MG/1
100 TABLET ORAL DAILY
Qty: 30 TABLET | Refills: 5 | Status: SHIPPED | OUTPATIENT
Start: 2017-12-18 | End: 2018-06-19 | Stop reason: SDUPTHER

## 2017-12-20 ENCOUNTER — TELEPHONE (OUTPATIENT)
Dept: HEMATOLOGY/ONCOLOGY | Facility: CLINIC | Age: 52
End: 2017-12-20

## 2017-12-20 NOTE — TELEPHONE ENCOUNTER
Spoke to pharmacy let them know that we had sent more information to the insurance company and we should hopefully know something in the next few days.

## 2017-12-20 NOTE — TELEPHONE ENCOUNTER
----- Message from Amy Lomax sent at 12/20/2017 12:47 PM CST -----  Contact: Lenin- [facility]  Lenin from [facility] called on behalf of above pt in regards to a prior authorization that was originally sent in and denied for pt's Cabometyx 60 mg tabs rx. Lenin was calling to check on the status of the appeal of the prior authorization.    Lenin can be reached at [phone]

## 2017-12-22 ENCOUNTER — PATIENT MESSAGE (OUTPATIENT)
Dept: CARDIOLOGY | Facility: CLINIC | Age: 52
End: 2017-12-22

## 2017-12-26 DIAGNOSIS — I10 ESSENTIAL HYPERTENSION: ICD-10-CM

## 2017-12-26 RX ORDER — METOPROLOL SUCCINATE 50 MG/1
TABLET, EXTENDED RELEASE ORAL
Qty: 90 TABLET | Refills: 1 | Status: SHIPPED | OUTPATIENT
Start: 2017-12-26 | End: 2018-02-08 | Stop reason: SDUPTHER

## 2018-01-11 ENCOUNTER — TELEPHONE (OUTPATIENT)
Dept: PHARMACY | Facility: CLINIC | Age: 53
End: 2018-01-11

## 2018-01-26 NOTE — PROGRESS NOTES
"Subjective:       Patient ID: Eva Bloom is a 52 y.o. female.    Chief Complaint: RCC    HPI     52 year old female, patient of Dr. Rivera, to clinic for 6 week follow up and to review labs for recurrent metastatic renal cell carcinoma. Pt is currently taking cabozantinib daily. Pt is on ESRD with HD every TTS. Patient tolerating treat extremely well and has no complaints today.Notes intermittent mild nausea.    Today, she denies any mouth sores, nausea, vomiting, diarrhea, constipation,weight loss, chest pain, leg swelling, headache, dizziness, or mood changes.    ECOG 1. She is accompanied to clinic alone.    Oncologic History (From Chart and Patient):  Eva Bloom is a 52 y.o. female with ESRD on HD who was found to have two L renal masses. She underwent L lap nephrectomy on 1/12/16. 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 Margins were negative.  Shehealed from surgery well, but then developed a large ovarian mass.  This was removed by gyn along with multiple sites of metastatic disease in the pelvis.  Path was c/w recurrence of RCC.     11/20/16 Pelvic ultrasound reveals "Large heterogeneous cystic and solid mass which appears to arise from the right ovary with worrisome imaging features for malignancy.  Differential diagnosis includes malignant tumors such as serous or mucinous cystadenocarcinoma.  It is important to note that the patient is at risk for ovarian torsion due to the size of the mass.Multiple uterine fibroids are identified with the largest in the uterine fundus."    11/22/16 pathology reveals "FINAL PATHOLOGIC DIAGNOSIS-RIGHT OVARY AND FALLOPIAN TUBE, RIGHT SALPINGO-OOPHORECTOMY:  -Positive for malignancy, high grade carcinoma morphologically and immunohistochemically consistent with metastasis from the patient's known renal cell carcinoma"    1/23/17 MRI abdomen " "reveals "Status post left nephrectomy, the left nephrectomy bed appears normal.There is a small liver lesion and a small splenic lesion, they cannot be fully characterized but they are unchanged from 5/27/16 suggestive of benign lesions.Multiple right kidney cysts.Cholelithiasis.Soft tissue oval density noted within the mesentery adjacent to the aorta right iliac bifurcation, nonspecific, could represent an enlarged lymph node, it is unchanged from prior studies"    1/23/17- CT Chest reveals "Stable pulmonary nodules, likely benign. No new pulmonary parenchymal abnormalities are identified. Enlarging nodular foci in partially visualized left upper abdomen. Peritoneal implants not excluded. Multiple thyroid nodules, measuring up to 1.6 cm."    4/5/17-MRI abdomen reveals "Again seen are the T2 hyperintense hepatic and splenic lesions that appear similar to prior exam and possibly represents hemangiomas. There are right renal cysts and the patient is status post left nephrectomy."    7/3/17- MRI abdomen "Left nephrectomy.  Right renal cysts with no malignant features.Liver and splenic lesions consistent with cysts versus hemangiomas.Cholelithiasis.Right pleural effusion and right basal atelectasis.Small pericardial effusion.Body wall edema and mesenteric edema."  7/3/17- CT chest "In this patient with a history of left renal cell carcinoma, there are 2 right lung nodules which are stable compared to January 2017.  2 left lung nodules seen on that study are not well-seen today, may be related to a small amount of pleural fluid.Bilateral pleural effusions, mildly decreased compared to prior study.Small amount of pericardial fluid, new."    Review of Systems   Constitutional: Positive for appetite change and fatigue. Negative for activity change, chills and fever.   HENT: Negative for congestion, hearing loss, mouth sores, sore throat, tinnitus and voice change.    Eyes: Negative for pain and visual disturbance. "   Respiratory: Positive for shortness of breath. Negative for cough and wheezing.    Cardiovascular: Negative for chest pain, palpitations and leg swelling.   Gastrointestinal: Negative for abdominal pain, constipation, diarrhea, nausea and vomiting.   Endocrine: Negative for cold intolerance and heat intolerance.   Genitourinary: Negative for difficulty urinating, dyspareunia, dysuria, frequency, menstrual problem, urgency, vaginal bleeding, vaginal discharge and vaginal pain.   Musculoskeletal: Negative for arthralgias and myalgias.   Skin: Negative for color change, rash and wound.   Allergic/Immunologic: Negative for environmental allergies and food allergies.   Neurological: Negative for weakness, numbness and headaches.   Hematological: Negative for adenopathy. Does not bruise/bleed easily.   Psychiatric/Behavioral: Negative for agitation, confusion, hallucinations and sleep disturbance. The patient is not nervous/anxious.    All other systems reviewed and are negative.      Allergies:  Review of patient's allergies indicates:   Allergen Reactions    Allopurinol      Other reaction(s): abnormal transaminases       Medications:  Current Outpatient Prescriptions   Medication Sig Dispense Refill    acetaminophen (TYLENOL) 500 MG tablet Take 500 mg by mouth every 6 (six) hours as needed for Pain.      aluminum-magnesium hydroxide-simethicone (MAALOX) 200-200-20 mg/5 mL Susp Take 30 mLs by mouth every 6 (six) hours as needed.       amLODIPine (NORVASC) 5 MG tablet Take 1 tablet (5 mg total) by mouth once daily. 90 tablet 0    cabozantinib 60 mg Tab Take 60 mg by mouth once daily. 30 tablet 11    febuxostat (ULORIC) 40 mg Tab Take 1 tablet (40 mg total) by mouth once daily. 30 tablet 4    furosemide (LASIX) 40 MG tablet Take 1 tablet (40 mg total) by mouth daily as needed (weight gain of 3 lbs in 24 hrs or 5 lbs in 1 week). 30 tablet 11    hydrALAZINE (APRESOLINE) 100 MG tablet Take 1 tablet (100 mg total)  by mouth every 12 (twelve) hours. 90 tablet 3    isosorbide mononitrate (IMDUR) 30 MG 24 hr tablet Take 1 tablet (30 mg total) by mouth once daily. 30 tablet 11    losartan (COZAAR) 100 MG tablet Take 1 tablet (100 mg total) by mouth once daily. 30 tablet 5    metoprolol succinate (TOPROL-XL) 50 MG 24 hr tablet TAKE 1 TABLET BY MOUTH EVERY DAY 90 tablet 1    multivitamin-Ca-iron-minerals (ONE-A-DAY WOMENS FORMULA) 27-0.4 mg Tab Take by mouth. 1 Tablet Oral Every day      ondansetron (ZOFRAN-ODT) 8 MG TbDL Take 1 tablet (8 mg total) by mouth every 12 (twelve) hours as needed. 30 tablet 1    psyllium husk-calcium (METAMUCIL PLUS CALCIUM) 1-60 gram-mg Cap Take by mouth. 2 Capsule Oral Every morning      sevelamer HCl (RENAGEL) 800 MG Tab Take 2 tablets (1,600 mg total) by mouth 3 (three) times daily with meals. 180 tablet 11     No current facility-administered medications for this visit.        PMH:  Past Medical History:   Diagnosis Date    Anemia     Bronchitis 03/01/2017    Cancer 2016    kidney cancer    CMV (cytomegalovirus) antibody positive     Essential hypertension 9/23/2015    H/O herpes simplex type 2 infection     Herpes simplex type 1 antibody positive     History of kidney cancer     s/p left nephrectomy 1/2016    Hyperparathyroidism, unspecified     Hyperuricemia without signs of inflammatory arthritis and tophaceous disease     Kidney stones     LGSIL (low grade squamous intraepithelial dysplasia)     Prediabetes     Proteinuria     Renal disorder     Thyroid nodule     Urate nephropathy        PSH:  Past Surgical History:   Procedure Laterality Date    BREAST CYST EXCISION      COLONOSCOPY N/A 11/12/2015    NEPHRECTOMY-LAPAROSCOPIC Left 01/12/2016    PERITONEAL CATHETER INSERTION      Permacath insertion  01/12/2017    SALPINGOOPHORECTOMY Right 2016    KJB---DAVINCI    TONSILLECTOMY      TUBAL LIGATION         FamHx:  Family History   Problem Relation Age of Onset     Hypertension Mother     Diabetes Father     Kidney disease Father     Diabetes Maternal Grandfather     No Known Problems Sister     No Known Problems Brother     Heart disease Sister     No Known Problems Sister     No Known Problems Brother     Breast cancer Neg Hx     Colon cancer Neg Hx     Cancer Neg Hx     Stroke Neg Hx        SocHx:  Social History     Social History    Marital status: Single     Spouse name: N/A    Number of children: 2    Years of education: N/A     Occupational History    [employer]     Social History Main Topics    Smoking status: Never Smoker    Smokeless tobacco: Never Used    Alcohol use No      Comment: . 2 children. works at [employer].    Drug use: No    Sexual activity: Yes     Partners: Male     Other Topics Concern    Not on file     Social History Narrative    No narrative on file         Objective:      Physical Exam   Constitutional: She is oriented to person, place, and time. She appears well-developed and well-nourished. No distress.   HENT:   Head: Normocephalic and atraumatic.   Nose: Nose normal.   Mouth/Throat: Oropharynx is clear and moist. No oropharyngeal exudate.   Eyes: Conjunctivae and EOM are normal. Pupils are equal, round, and reactive to light. Right eye exhibits no discharge. Left eye exhibits no discharge.   Neck: Normal range of motion. Neck supple. No tracheal deviation present.   Cardiovascular: Normal rate and regular rhythm.  Exam reveals no gallop and no friction rub.    Murmur heard.  Pulmonary/Chest: Effort normal and breath sounds normal. No respiratory distress. She has no wheezes. She has no rales.   Abdominal: Soft. Bowel sounds are normal. She exhibits no distension. There is no tenderness. There is no rebound and no guarding.   Genitourinary: No breast tenderness, discharge or bleeding.   Musculoskeletal: Normal range of motion. She exhibits no edema or tenderness.   Neurological: She is alert and  oriented to person, place, and time. She has normal reflexes. No cranial nerve deficit. Coordination normal.   Skin: Skin is warm and dry. No rash noted. She is not diaphoretic. No erythema. No pallor.   Psychiatric: She has a normal mood and affect. Her behavior is normal. Thought content normal.   Nursing note and vitals reviewed.      LABS:  WBC   Date Value Ref Range Status   01/29/2018 5.26 3.90 - 12.70 K/uL Final     Hemoglobin   Date Value Ref Range Status   01/29/2018 12.0 12.0 - 16.0 g/dL Final     Hematocrit   Date Value Ref Range Status   01/29/2018 37.6 37.0 - 48.5 % Final     Platelets   Date Value Ref Range Status   01/29/2018 129 (L) 150 - 350 K/uL Final       Chemistry        Component Value Date/Time     01/29/2018 0812    K 4.5 01/29/2018 0812     01/29/2018 0812    CO2 26 01/29/2018 0812    BUN 63 (H) 01/29/2018 0812    CREATININE 7.6 (H) 01/29/2018 0812     (H) 01/29/2018 0812        Component Value Date/Time    CALCIUM 10.1 01/29/2018 0812    ALKPHOS 165 (H) 01/29/2018 0812    AST 24 01/29/2018 0812    ALT 18 01/29/2018 0812    BILITOT 1.4 (H) 01/29/2018 0812            Assessment:       1. Metastatic renal cell carcinoma, left    2. Malignant neoplasm metastatic to peritoneum    3. History of right oophorectomy    4. S/p nephrectomy left    5. Encounter for chemotherapy management    6. Anemia in ESRD (end-stage renal disease)    7. ESRD on hemodialysis    8. Hypertensive kidney disease with chronic kidney disease, stage 1-4 or unspecified chronic kidney disease    9. Essential hypertension    10. Abnormal echocardiogram    11. Thyroid nodule    12. Chemotherapy induced nausea and vomiting        Plan:     1,2,3,4,5- Metastatic Renal Cell Carcinoma:    - It is unlikely that surgery removed all sites of metastatic disease. Given that this is c/w her previous papillary disease, pt started on oral cabozantinib 60mg daily (for dual VEGF and MET inhibition), especially given  recent data that shows cabo is far superior to student in the first line setting.  If pt progresses on this regimen, will switch to nivolumab.     Restaging scans from 7/3/17 and 9/2017 show stable disease and pt tolerating cabozantinib relatively well. We will continue cabozantinib for now. Cytology from thoracentesis did not show malignant cells.Restaging scans show extremely stable disease. Working on getting authorization for her cabozantinib for next year. Will plan to get restaging scans in 3 mos.     RTC in 6 weeks with CT chest, MRI abdomen, (CBC,CMP) and to see Dr. Rivera.    6- Mild, will monitor.    7,8,9,10- BP elevated today. Following with cardiology and PCP to manage.      11- Had thyroid US and biopsy last year, which was negative. Will monitor for now.    12- Refill Zofran.      Patient is in agreement with the proposed treatment plan. All questions were answered to the patient's satisfaction. Pt knows to call clinic if anything is needed before the next clinic visit.    NAS Telles  Hematology and Medical Oncology

## 2018-01-29 ENCOUNTER — OFFICE VISIT (OUTPATIENT)
Dept: HEMATOLOGY/ONCOLOGY | Facility: CLINIC | Age: 53
End: 2018-01-29
Payer: COMMERCIAL

## 2018-01-29 ENCOUNTER — LAB VISIT (OUTPATIENT)
Dept: LAB | Facility: HOSPITAL | Age: 53
End: 2018-01-29
Payer: COMMERCIAL

## 2018-01-29 VITALS
WEIGHT: 130.31 LBS | TEMPERATURE: 98 F | BODY MASS INDEX: 22.25 KG/M2 | SYSTOLIC BLOOD PRESSURE: 176 MMHG | HEIGHT: 64 IN | DIASTOLIC BLOOD PRESSURE: 89 MMHG | HEART RATE: 72 BPM | RESPIRATION RATE: 16 BRPM

## 2018-01-29 DIAGNOSIS — T45.1X5A CHEMOTHERAPY INDUCED NAUSEA AND VOMITING: ICD-10-CM

## 2018-01-29 DIAGNOSIS — Z99.2 ESRD ON HEMODIALYSIS: ICD-10-CM

## 2018-01-29 DIAGNOSIS — Z90.721 HISTORY OF RIGHT OOPHORECTOMY: ICD-10-CM

## 2018-01-29 DIAGNOSIS — Z51.11 ENCOUNTER FOR CHEMOTHERAPY MANAGEMENT: ICD-10-CM

## 2018-01-29 DIAGNOSIS — I10 ESSENTIAL HYPERTENSION: ICD-10-CM

## 2018-01-29 DIAGNOSIS — Z90.5 S/P NEPHRECTOMY: ICD-10-CM

## 2018-01-29 DIAGNOSIS — R93.1 ABNORMAL ECHOCARDIOGRAM: ICD-10-CM

## 2018-01-29 DIAGNOSIS — C64.2 METASTATIC RENAL CELL CARCINOMA, LEFT: ICD-10-CM

## 2018-01-29 DIAGNOSIS — N18.6 ANEMIA IN ESRD (END-STAGE RENAL DISEASE): ICD-10-CM

## 2018-01-29 DIAGNOSIS — D63.1 ANEMIA IN ESRD (END-STAGE RENAL DISEASE): ICD-10-CM

## 2018-01-29 DIAGNOSIS — C78.6 MALIGNANT NEOPLASM METASTATIC TO PERITONEUM: ICD-10-CM

## 2018-01-29 DIAGNOSIS — E04.1 THYROID NODULE: ICD-10-CM

## 2018-01-29 DIAGNOSIS — I12.9 HYPERTENSIVE KIDNEY DISEASE WITH CHRONIC KIDNEY DISEASE, STAGE 1-4 OR UNSPECIFIED CHRONIC KIDNEY DISEASE: ICD-10-CM

## 2018-01-29 DIAGNOSIS — R11.2 CHEMOTHERAPY INDUCED NAUSEA AND VOMITING: ICD-10-CM

## 2018-01-29 DIAGNOSIS — C64.2 METASTATIC RENAL CELL CARCINOMA, LEFT: Primary | ICD-10-CM

## 2018-01-29 DIAGNOSIS — N18.6 ESRD ON HEMODIALYSIS: ICD-10-CM

## 2018-01-29 LAB
ALBUMIN SERPL BCP-MCNC: 2.8 G/DL
ALP SERPL-CCNC: 165 U/L
ALT SERPL W/O P-5'-P-CCNC: 18 U/L
ANION GAP SERPL CALC-SCNC: 12 MMOL/L
AST SERPL-CCNC: 24 U/L
BILIRUB SERPL-MCNC: 1.4 MG/DL
BUN SERPL-MCNC: 63 MG/DL
CALCIUM SERPL-MCNC: 10.1 MG/DL
CHLORIDE SERPL-SCNC: 101 MMOL/L
CO2 SERPL-SCNC: 26 MMOL/L
CREAT SERPL-MCNC: 7.6 MG/DL
ERYTHROCYTE [DISTWIDTH] IN BLOOD BY AUTOMATED COUNT: 15.9 %
EST. GFR  (AFRICAN AMERICAN): 6.4 ML/MIN/1.73 M^2
EST. GFR  (NON AFRICAN AMERICAN): 5.6 ML/MIN/1.73 M^2
GLUCOSE SERPL-MCNC: 131 MG/DL
HCT VFR BLD AUTO: 37.6 %
HGB BLD-MCNC: 12 G/DL
IMM GRANULOCYTES # BLD AUTO: 0.02 K/UL
MCH RBC QN AUTO: 31.9 PG
MCHC RBC AUTO-ENTMCNC: 31.9 G/DL
MCV RBC AUTO: 100 FL
NEUTROPHILS # BLD AUTO: 3 K/UL
PLATELET # BLD AUTO: 129 K/UL
PMV BLD AUTO: 10.8 FL
POTASSIUM SERPL-SCNC: 4.5 MMOL/L
PROT SERPL-MCNC: 6.7 G/DL
RBC # BLD AUTO: 3.76 M/UL
SODIUM SERPL-SCNC: 139 MMOL/L
WBC # BLD AUTO: 5.26 K/UL

## 2018-01-29 PROCEDURE — 80053 COMPREHEN METABOLIC PANEL: CPT

## 2018-01-29 PROCEDURE — 85027 COMPLETE CBC AUTOMATED: CPT

## 2018-01-29 PROCEDURE — 99214 OFFICE O/P EST MOD 30 MIN: CPT | Mod: S$GLB,,, | Performed by: NURSE PRACTITIONER

## 2018-01-29 PROCEDURE — 99999 PR PBB SHADOW E&M-EST. PATIENT-LVL IV: CPT | Mod: PBBFAC,,, | Performed by: NURSE PRACTITIONER

## 2018-01-29 PROCEDURE — 36415 COLL VENOUS BLD VENIPUNCTURE: CPT

## 2018-01-29 RX ORDER — ONDANSETRON 8 MG/1
8 TABLET, ORALLY DISINTEGRATING ORAL EVERY 8 HOURS PRN
Qty: 30 TABLET | Refills: 1 | Status: SHIPPED | OUTPATIENT
Start: 2018-01-29 | End: 2018-09-28 | Stop reason: SDUPTHER

## 2018-01-29 NOTE — LETTER
January 29, 2018    Eva Bloom  [address] [address]             [facility]  [address]  [place] [address]  Phone: [phone] To whom it may concern,     Please excuse Mr. Bloom from missing work today. He accompanied his wife to clinic for her appointment.    If you have any questions or concerns, please don't hesitate to call.    Sincerely,        Liset Ngo NP

## 2018-02-08 DIAGNOSIS — I10 ESSENTIAL HYPERTENSION: ICD-10-CM

## 2018-02-08 RX ORDER — METOPROLOL SUCCINATE 100 MG/1
100 TABLET, EXTENDED RELEASE ORAL DAILY
Qty: 90 TABLET | Refills: 3 | Status: SHIPPED | OUTPATIENT
Start: 2018-02-08 | End: 2018-04-19 | Stop reason: SDUPTHER

## 2018-02-21 ENCOUNTER — HOSPITAL ENCOUNTER (OUTPATIENT)
Dept: RADIOLOGY | Facility: HOSPITAL | Age: 53
Discharge: HOME | End: 2018-02-21
Attending: NURSE PRACTITIONER
Payer: COMMERCIAL

## 2018-02-21 DIAGNOSIS — C64.2 METASTATIC RENAL CELL CARCINOMA, LEFT: ICD-10-CM

## 2018-02-21 DIAGNOSIS — C78.6 MALIGNANT NEOPLASM METASTATIC TO PERITONEUM: ICD-10-CM

## 2018-02-21 PROCEDURE — 71250 CT THORAX DX C-: CPT | Mod: 26,,, | Performed by: RADIOLOGY

## 2018-02-21 PROCEDURE — 71250 CT THORAX DX C-: CPT | Mod: TC

## 2018-02-21 PROCEDURE — 74181 MRI ABDOMEN W/O CONTRAST: CPT | Mod: 26,,, | Performed by: RADIOLOGY

## 2018-02-21 PROCEDURE — 74181 MRI ABDOMEN W/O CONTRAST: CPT | Mod: TC

## 2018-03-01 ENCOUNTER — TELEPHONE (OUTPATIENT)
Dept: HEMATOLOGY/ONCOLOGY | Facility: CLINIC | Age: 53
End: 2018-03-01

## 2018-03-01 ENCOUNTER — TELEPHONE (OUTPATIENT)
Dept: PHARMACY | Facility: CLINIC | Age: 53
End: 2018-03-01

## 2018-03-01 NOTE — TELEPHONE ENCOUNTER
spoke with pt on today in regards to changing schedule times for appointments on 03/212/18, pt has confirm new times.

## 2018-03-02 ENCOUNTER — TELEPHONE (OUTPATIENT)
Dept: PHARMACY | Facility: CLINIC | Age: 53
End: 2018-03-02

## 2018-03-02 NOTE — TELEPHONE ENCOUNTER
Patient returned phone call back regarding specialty medication for Cabometyx. Patient informed she have a whole unopened bottle left. Inform her the insurance/prior authorization is on file & copay: $5.00. Let her know the the MFR assistance will be cancel & our team will reach out to patient to set up delivery with [facility]. MICHELLE @10:19am

## 2018-03-02 NOTE — TELEPHONE ENCOUNTER
LVM for initial consult/follow up.  Patient has been on medication and receiving from , but will be transitioning back to [facility] for her next refill.  She should have about a months worth of medication on hand.  Will follow up to see how she's doing on medication and to determine exact count to coordinate her next refill accordingly.  Copay should be $5.00 (004) from here on out.

## 2018-03-12 ENCOUNTER — LAB VISIT (OUTPATIENT)
Dept: LAB | Facility: HOSPITAL | Age: 53
End: 2018-03-12
Attending: INTERNAL MEDICINE
Payer: COMMERCIAL

## 2018-03-12 ENCOUNTER — OFFICE VISIT (OUTPATIENT)
Dept: HEMATOLOGY/ONCOLOGY | Facility: CLINIC | Age: 53
End: 2018-03-12
Payer: COMMERCIAL

## 2018-03-12 VITALS
SYSTOLIC BLOOD PRESSURE: 185 MMHG | DIASTOLIC BLOOD PRESSURE: 90 MMHG | WEIGHT: 127.63 LBS | BODY MASS INDEX: 21.79 KG/M2 | RESPIRATION RATE: 18 BRPM | TEMPERATURE: 98 F | HEIGHT: 64 IN | HEART RATE: 76 BPM | OXYGEN SATURATION: 98 %

## 2018-03-12 DIAGNOSIS — C64.2 METASTATIC RENAL CELL CARCINOMA, LEFT: ICD-10-CM

## 2018-03-12 DIAGNOSIS — C78.6 MALIGNANT NEOPLASM METASTATIC TO PERITONEUM: ICD-10-CM

## 2018-03-12 DIAGNOSIS — C64.2 METASTATIC RENAL CELL CARCINOMA, LEFT: Primary | ICD-10-CM

## 2018-03-12 LAB
ALBUMIN SERPL BCP-MCNC: 3.2 G/DL
ALP SERPL-CCNC: 192 U/L
ALT SERPL W/O P-5'-P-CCNC: 20 U/L
ANION GAP SERPL CALC-SCNC: 14 MMOL/L
AST SERPL-CCNC: 24 U/L
BILIRUB SERPL-MCNC: 1.3 MG/DL
BUN SERPL-MCNC: 65 MG/DL
CALCIUM SERPL-MCNC: 10.5 MG/DL
CHLORIDE SERPL-SCNC: 105 MMOL/L
CO2 SERPL-SCNC: 23 MMOL/L
CREAT SERPL-MCNC: 7.4 MG/DL
ERYTHROCYTE [DISTWIDTH] IN BLOOD BY AUTOMATED COUNT: 16 %
EST. GFR  (AFRICAN AMERICAN): 6.6 ML/MIN/1.73 M^2
EST. GFR  (NON AFRICAN AMERICAN): 5.7 ML/MIN/1.73 M^2
GLUCOSE SERPL-MCNC: 103 MG/DL
HCT VFR BLD AUTO: 39.3 %
HGB BLD-MCNC: 12.6 G/DL
IMM GRANULOCYTES # BLD AUTO: 0.05 K/UL
MCH RBC QN AUTO: 31.3 PG
MCHC RBC AUTO-ENTMCNC: 32.1 G/DL
MCV RBC AUTO: 98 FL
NEUTROPHILS # BLD AUTO: 3.5 K/UL
PLATELET # BLD AUTO: 121 K/UL
PMV BLD AUTO: 11.3 FL
POTASSIUM SERPL-SCNC: 4.5 MMOL/L
PROT SERPL-MCNC: 7 G/DL
RBC # BLD AUTO: 4.03 M/UL
SODIUM SERPL-SCNC: 142 MMOL/L
WBC # BLD AUTO: 5.83 K/UL

## 2018-03-12 PROCEDURE — 36415 COLL VENOUS BLD VENIPUNCTURE: CPT

## 2018-03-12 PROCEDURE — 99214 OFFICE O/P EST MOD 30 MIN: CPT | Mod: S$GLB,,, | Performed by: INTERNAL MEDICINE

## 2018-03-12 PROCEDURE — 99999 PR PBB SHADOW E&M-EST. PATIENT-LVL III: CPT | Mod: PBBFAC,,, | Performed by: INTERNAL MEDICINE

## 2018-03-12 PROCEDURE — 85027 COMPLETE CBC AUTOMATED: CPT

## 2018-03-12 PROCEDURE — 80053 COMPREHEN METABOLIC PANEL: CPT

## 2018-03-12 NOTE — PROGRESS NOTES
"Subjective:       Patient ID: Eva Bloom is a 52 y.o. female.    Chief Complaint: RCC    HPI     52 year old female to clinic for 6 week follow up and to review labs and scans for recurrent metastatic renal cell carcinoma. Pt is currently taking cabozantinib daily. Pt is on ESRD with HD every TTS. Patient tolerating treat extremely well and has no complaints today.     Today, she denies any mouth sores, nausea, vomiting, diarrhea, constipation,weight loss, chest pain, leg swelling, headache, dizziness, or mood changes.    ECOG 1. She is accompanied to clinic alone.      Oncologic History (From Chart and Patient):    Eva Bloom is a 52 y.o. female with ESRD on HD who was found to have two L renal masses. She underwent L lap nephrectomy on 1/12/16. 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 Margins were negative.  Shehealed from surgery well, but then developed a large ovarian mass.  This was removed by gyn along with multiple sites of metastatic disease in the pelvis.  Path was c/w recurrence of RCC.     11/20/16 Pelvic ultrasound reveals "Large heterogeneous cystic and solid mass which appears to arise from the right ovary with worrisome imaging features for malignancy.  Differential diagnosis includes malignant tumors such as serous or mucinous cystadenocarcinoma.  It is important to note that the patient is at risk for ovarian torsion due to the size of the mass.Multiple uterine fibroids are identified with the largest in the uterine fundus."    11/22/16 pathology reveals "FINAL PATHOLOGIC DIAGNOSIS-RIGHT OVARY AND FALLOPIAN TUBE, RIGHT SALPINGO-OOPHORECTOMY:  -Positive for malignancy, high grade carcinoma morphologically and immunohistochemically consistent with metastasis from the patient's known renal cell carcinoma"    1/23/17 MRI abdomen reveals "Status post left nephrectomy, " "the left nephrectomy bed appears normal.There is a small liver lesion and a small splenic lesion, they cannot be fully characterized but they are unchanged from 5/27/16 suggestive of benign lesions.Multiple right kidney cysts.Cholelithiasis.Soft tissue oval density noted within the mesentery adjacent to the aorta right iliac bifurcation, nonspecific, could represent an enlarged lymph node, it is unchanged from prior studies"    1/23/17- CT Chest reveals "Stable pulmonary nodules, likely benign. No new pulmonary parenchymal abnormalities are identified. Enlarging nodular foci in partially visualized left upper abdomen. Peritoneal implants not excluded. Multiple thyroid nodules, measuring up to 1.6 cm."    4/5/17-MRI abdomen reveals "Again seen are the T2 hyperintense hepatic and splenic lesions that appear similar to prior exam and possibly represents hemangiomas. There are right renal cysts and the patient is status post left nephrectomy."    7/3/17- MRI abdomen "Left nephrectomy.  Right renal cysts with no malignant features.Liver and splenic lesions consistent with cysts versus hemangiomas.Cholelithiasis.Right pleural effusion and right basal atelectasis.Small pericardial effusion.Body wall edema and mesenteric edema."  7/3/17- CT chest "In this patient with a history of left renal cell carcinoma, there are 2 right lung nodules which are stable compared to January 2017.  2 left lung nodules seen on that study are not well-seen today, may be related to a small amount of pleural fluid.Bilateral pleural effusions, mildly decreased compared to prior study.Small amount of pericardial fluid, new."    Review of Systems   Constitutional: Positive for appetite change and fatigue. Negative for activity change, chills and fever.   HENT: Negative for congestion, hearing loss, mouth sores, sore throat, tinnitus and voice change.    Eyes: Negative for pain and visual disturbance.   Respiratory: Positive for shortness of breath. " Negative for cough and wheezing.    Cardiovascular: Negative for chest pain, palpitations and leg swelling.   Gastrointestinal: Negative for abdominal pain, constipation, diarrhea, nausea and vomiting.   Endocrine: Negative for cold intolerance and heat intolerance.   Genitourinary: Negative for difficulty urinating, dyspareunia, dysuria, frequency, menstrual problem, urgency, vaginal bleeding, vaginal discharge and vaginal pain.   Musculoskeletal: Negative for arthralgias and myalgias.   Skin: Negative for color change, rash and wound.   Allergic/Immunologic: Negative for environmental allergies and food allergies.   Neurological: Negative for weakness, numbness and headaches.   Hematological: Negative for adenopathy. Does not bruise/bleed easily.   Psychiatric/Behavioral: Negative for agitation, confusion, hallucinations and sleep disturbance. The patient is not nervous/anxious.    All other systems reviewed and are negative.      Allergies:  Review of patient's allergies indicates:   Allergen Reactions    Allopurinol      Other reaction(s): abnormal transaminases       Medications:  Current Outpatient Prescriptions   Medication Sig Dispense Refill    acetaminophen (TYLENOL) 500 MG tablet Take 500 mg by mouth every 6 (six) hours as needed for Pain.      aluminum-magnesium hydroxide-simethicone (MAALOX) 200-200-20 mg/5 mL Susp Take 30 mLs by mouth every 6 (six) hours as needed.       amLODIPine (NORVASC) 5 MG tablet Take 1 tablet (5 mg total) by mouth once daily. 90 tablet 0    cabozantinib 60 mg Tab Take 60 mg by mouth once daily. 30 tablet 11    febuxostat (ULORIC) 40 mg Tab Take 1 tablet (40 mg total) by mouth once daily. 30 tablet 4    furosemide (LASIX) 40 MG tablet Take 1 tablet (40 mg total) by mouth daily as needed (weight gain of 3 lbs in 24 hrs or 5 lbs in 1 week). 30 tablet 11    hydrALAZINE (APRESOLINE) 100 MG tablet Take 1 tablet (100 mg total) by mouth every 12 (twelve) hours. 90 tablet 3     isosorbide mononitrate (IMDUR) 30 MG 24 hr tablet Take 1 tablet (30 mg total) by mouth once daily. 30 tablet 11    metoprolol succinate (TOPROL-XL) 100 MG 24 hr tablet Take 1 tablet (100 mg total) by mouth once daily. 90 tablet 3    multivitamin-Ca-iron-minerals (ONE-A-DAY WOMENS FORMULA) 27-0.4 mg Tab Take by mouth. 1 Tablet Oral Every day      ondansetron (ZOFRAN-ODT) 8 MG TbDL Take 1 tablet (8 mg total) by mouth every 8 (eight) hours as needed. 30 tablet 1    psyllium husk-calcium (METAMUCIL PLUS CALCIUM) 1-60 gram-mg Cap Take by mouth. 2 Capsule Oral Every morning      sevelamer HCl (RENAGEL) 800 MG Tab Take 2 tablets (1,600 mg total) by mouth 3 (three) times daily with meals. 180 tablet 11    losartan (COZAAR) 100 MG tablet Take 1 tablet (100 mg total) by mouth once daily. 30 tablet 5     No current facility-administered medications for this visit.        PMH:  Past Medical History:   Diagnosis Date    Anemia     Bronchitis 03/01/2017    Cancer 2016    kidney cancer    CMV (cytomegalovirus) antibody positive     Essential hypertension 9/23/2015    H/O herpes simplex type 2 infection     Herpes simplex type 1 antibody positive     History of kidney cancer     s/p left nephrectomy 1/2016    Hyperparathyroidism, unspecified     Hyperuricemia without signs of inflammatory arthritis and tophaceous disease     Kidney stones     LGSIL (low grade squamous intraepithelial dysplasia)     Prediabetes     Proteinuria     Renal disorder     Thyroid nodule     Urate nephropathy        PSH:  Past Surgical History:   Procedure Laterality Date    BREAST CYST EXCISION      COLONOSCOPY N/A 11/12/2015    NEPHRECTOMY-LAPAROSCOPIC Left 01/12/2016    PERITONEAL CATHETER INSERTION      Permacath insertion  01/12/2017    SALPINGOOPHORECTOMY Right 2016    [facility]---DAVINCI    TONSILLECTOMY      TUBAL LIGATION         FamHx:  Family History   Problem Relation Age of Onset    Hypertension Mother     Diabetes  Father     Kidney disease Father     Diabetes Maternal Grandfather     No Known Problems Sister     No Known Problems Brother     Heart disease Sister     No Known Problems Sister     No Known Problems Brother     Breast cancer Neg Hx     Colon cancer Neg Hx     Cancer Neg Hx     Stroke Neg Hx        SocHx:  Social History     Social History    Marital status:      Spouse name: N/A    Number of children: 2    Years of education: N/A     Occupational History    [employer]     Social History Main Topics    Smoking status: Never Smoker    Smokeless tobacco: Never Used    Alcohol use No      Comment: . 2 children. works at [employer].    Drug use: No    Sexual activity: Yes     Partners: Male     Other Topics Concern    Not on file     Social History Narrative    No narrative on file         Objective:      Physical Exam   Constitutional: She is oriented to person, place, and time. She appears well-developed and well-nourished. No distress.   HENT:   Head: Normocephalic and atraumatic.   Nose: Nose normal.   Mouth/Throat: Oropharynx is clear and moist. No oropharyngeal exudate.   Eyes: Conjunctivae and EOM are normal. Pupils are equal, round, and reactive to light. Right eye exhibits no discharge. Left eye exhibits no discharge.   Neck: Normal range of motion. Neck supple. No tracheal deviation present.   Cardiovascular: Normal rate and regular rhythm.  Exam reveals no gallop and no friction rub.    Murmur heard.  Pulmonary/Chest: Effort normal and breath sounds normal. No respiratory distress. She has no wheezes. She has no rales.   Abdominal: Soft. Bowel sounds are normal. She exhibits no distension. There is no tenderness. There is no rebound and no guarding.   Genitourinary: No breast tenderness, discharge or bleeding.   Musculoskeletal: Normal range of motion. She exhibits no edema or tenderness.   Neurological: She is alert and oriented to person, place, and time.  She has normal reflexes. No cranial nerve deficit. Coordination normal.   Skin: Skin is warm and dry. No rash noted. She is not diaphoretic. No erythema. No pallor.   Psychiatric: She has a normal mood and affect. Her behavior is normal. Thought content normal.   Nursing note and vitals reviewed.      LABS:  WBC   Date Value Ref Range Status   03/12/2018 5.83 3.90 - 12.70 K/uL Final     Hemoglobin   Date Value Ref Range Status   03/12/2018 12.6 12.0 - 16.0 g/dL Final     Hematocrit   Date Value Ref Range Status   03/12/2018 39.3 37.0 - 48.5 % Final     Platelets   Date Value Ref Range Status   03/12/2018 121 (L) 150 - 350 K/uL Final       Chemistry        Component Value Date/Time     03/12/2018 0832    K 4.5 03/12/2018 0832     03/12/2018 0832    CO2 23 03/12/2018 0832    BUN 65 (H) 03/12/2018 0832    CREATININE 7.4 (H) 03/12/2018 0832     03/12/2018 0832        Component Value Date/Time    CALCIUM 10.5 03/12/2018 0832    ALKPHOS 192 (H) 03/12/2018 0832    AST 24 03/12/2018 0832    ALT 20 03/12/2018 0832    BILITOT 1.3 (H) 03/12/2018 0832            Assessment:       1. Metastatic renal cell carcinoma, left    2. Malignant neoplasm metastatic to peritoneum          Plan:     1. Metastatic Renal Cell Carcinoma:    - It is unlikely that surgery removed all sites of metastatic disease. Given that this is c/w her previous papillary disease, pt started on oral cabozantinib 60mg daily (for dual VEGF and MET inhibition), especially given recent data that shows cabo is far superior to Sutent in the first line setting.  If pt progresses on this regimen, will switch to nivolumab.     Restaging scans now again show stable disease and pt tolerating cabozantinib relatively well. We will continue cabozantinib for now. Cytology from thoracentesis did not show malignant cells. Restaging scans show extremely stable disease. Working on getting authorization for her cabozantinib for next year.      RTC in 8 weeks  with (CBC,CMP) and to see me.  Will plan to get restaging scans in 4 mos.      Patient is in agreement with the proposed treatment plan. All questions were answered to the patient's satisfaction. Pt knows to call clinic if anything is needed before the next clinic visit.    More than 25 mins were spent during this encounter, greater than 50% was spent in direct counseling and/or coordination of care.     Ben Rivera M.D., M.S., F.A.C.P.  Hematology and Oncology Attending  [facility]

## 2018-03-13 ENCOUNTER — TELEPHONE (OUTPATIENT)
Dept: HEMATOLOGY/ONCOLOGY | Facility: CLINIC | Age: 53
End: 2018-03-13

## 2018-03-13 NOTE — TELEPHONE ENCOUNTER
Spoke to pharmacy, they have been unable to reach patient to schedule delivery, clarified phone number. If patient calls office phone number she can call is [phone] to set up delivery.

## 2018-03-15 DIAGNOSIS — Z99.2 END STAGE RENAL FAILURE ON DIALYSIS: Primary | ICD-10-CM

## 2018-03-15 DIAGNOSIS — N18.6 END STAGE RENAL FAILURE ON DIALYSIS: Primary | ICD-10-CM

## 2018-03-29 DIAGNOSIS — I10 ESSENTIAL HYPERTENSION: ICD-10-CM

## 2018-03-29 RX ORDER — AMLODIPINE BESYLATE 5 MG/1
5 TABLET ORAL DAILY
Qty: 90 TABLET | Refills: 0 | Status: SHIPPED | OUTPATIENT
Start: 2018-03-29 | End: 2018-04-19 | Stop reason: SDUPTHER

## 2018-04-19 DIAGNOSIS — I10 ESSENTIAL HYPERTENSION: ICD-10-CM

## 2018-04-19 RX ORDER — AMLODIPINE BESYLATE 5 MG/1
5 TABLET ORAL DAILY
Qty: 30 TABLET | Refills: 5 | Status: SHIPPED | OUTPATIENT
Start: 2018-04-19 | End: 2018-09-11 | Stop reason: SDUPTHER

## 2018-04-19 RX ORDER — METOPROLOL SUCCINATE 50 MG/1
50 TABLET, EXTENDED RELEASE ORAL DAILY
Qty: 30 TABLET | Refills: 5 | Status: SHIPPED | OUTPATIENT
Start: 2018-04-19 | End: 2018-09-11 | Stop reason: SDUPTHER

## 2018-04-20 ENCOUNTER — OFFICE VISIT (OUTPATIENT)
Dept: VASCULAR SURGERY | Facility: CLINIC | Age: 53
End: 2018-04-20
Payer: COMMERCIAL

## 2018-04-20 ENCOUNTER — HOSPITAL ENCOUNTER (OUTPATIENT)
Dept: VASCULAR SURGERY | Facility: CLINIC | Age: 53
Discharge: HOME | End: 2018-04-20
Attending: SURGERY
Payer: COMMERCIAL

## 2018-04-20 VITALS
TEMPERATURE: 98 F | DIASTOLIC BLOOD PRESSURE: 91 MMHG | WEIGHT: 125.69 LBS | HEIGHT: 64 IN | BODY MASS INDEX: 21.46 KG/M2 | SYSTOLIC BLOOD PRESSURE: 171 MMHG | HEART RATE: 97 BPM

## 2018-04-20 DIAGNOSIS — Z99.2 END STAGE RENAL FAILURE ON DIALYSIS: ICD-10-CM

## 2018-04-20 DIAGNOSIS — N18.6 ESRD ON HEMODIALYSIS: Primary | ICD-10-CM

## 2018-04-20 DIAGNOSIS — Z99.2 ESRD ON HEMODIALYSIS: Primary | ICD-10-CM

## 2018-04-20 DIAGNOSIS — N18.6 END STAGE RENAL FAILURE ON DIALYSIS: ICD-10-CM

## 2018-04-20 PROCEDURE — 99213 OFFICE O/P EST LOW 20 MIN: CPT | Mod: S$GLB,,, | Performed by: SURGERY

## 2018-04-20 PROCEDURE — 99999 PR PBB SHADOW E&M-EST. PATIENT-LVL III: CPT | Mod: PBBFAC,,, | Performed by: SURGERY

## 2018-04-20 PROCEDURE — 93990 DOPPLER FLOW TESTING: CPT | Mod: S$GLB,,, | Performed by: SURGERY

## 2018-04-20 NOTE — PROGRESS NOTES
See my prior note, review of systems, family history and social history are   unchanged.    HISTORY OF PRESENT ILLNESS:  A 52-year-old female with end-stage renal disease,   well known to me, status post,  1.  Covered stent placement, left AV fistula outflow tract, 03/06/2017.  2.  Left upper arm 4-7 tapered AV graft on 01/01/2017.    Of note, at the time of surgery was found to have an exceptionally thin and   friable 4 mm brachial vein, which she used outflow and which she returned with   the stenosis that was treated empirically with a Covered stent because of the   risk of rupture.    She now returns for a 6 month visit.   She says on occasion, she has increased bleeding, but this has   not been anything consistent.  No other issues. This is stable    PHYSICAL EXAMINATION:  VITAL SIGNS:  See nursing note.  EXTREMITIES:  Left arm shows a good thrill with minimal pulsatility in the   graft.  There is no skin thinning or ulceration. Unchanged    IMAGING:  Duplex of the graft shows it to be patent with minimal stenosis ( in stent distally),   foot volume is robust  2.9 L (prior 2.8 liters.    ASSESSMENT:  Well-functioning left upper arm AV graft with Viabahn stent graft   outflow treatment.    PLAN:  Lengthen surveillance.  Follow up in six months with an AV fistula duplex   if clinically indicated.    MANPREET Muñoz III, MD, FACS  Professor and Chief, Vascular and Endovascular Surgery

## 2018-05-08 NOTE — PROGRESS NOTES
"Subjective:       Patient ID: Eva Bloom is a 52 y.o. female.    Chief Complaint: RCC    HPI     52 year old female to clinic for 8 week follow up and to review labs for recurrent metastatic renal cell carcinoma. Pt is currently taking cabozantinib daily. Pt is on ESRD with HD every TTS. Patient tolerating treat extremely well and has no complaints today. She denies any issues with SE.    Today, she denies any mouth sores, nausea, vomiting, diarrhea, constipation,weight loss, chest pain, leg swelling, headache, dizziness, or mood changes.    ECOG 1. She is accompanied to clinic alone.    Oncologic History (From Chart and Patient):  Eva Bloom is a 52 y.o. female with ESRD on HD who was found to have two L renal masses. She underwent L lap nephrectomy on 1/12/16. 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 Margins were negative.  Shehealed from surgery well, but then developed a large ovarian mass.  This was removed by gyn along with multiple sites of metastatic disease in the pelvis.  Path was c/w recurrence of RCC.     11/20/16 Pelvic ultrasound reveals "Large heterogeneous cystic and solid mass which appears to arise from the right ovary with worrisome imaging features for malignancy.  Differential diagnosis includes malignant tumors such as serous or mucinous cystadenocarcinoma.  It is important to note that the patient is at risk for ovarian torsion due to the size of the mass.Multiple uterine fibroids are identified with the largest in the uterine fundus."    11/22/16 pathology reveals "FINAL PATHOLOGIC DIAGNOSIS-RIGHT OVARY AND FALLOPIAN TUBE, RIGHT SALPINGO-OOPHORECTOMY:  -Positive for malignancy, high grade carcinoma morphologically and immunohistochemically consistent with metastasis from the patient's known renal cell carcinoma"    1/23/17 MRI abdomen reveals "Status post left " "nephrectomy, the left nephrectomy bed appears normal.There is a small liver lesion and a small splenic lesion, they cannot be fully characterized but they are unchanged from 5/27/16 suggestive of benign lesions.Multiple right kidney cysts.Cholelithiasis.Soft tissue oval density noted within the mesentery adjacent to the aorta right iliac bifurcation, nonspecific, could represent an enlarged lymph node, it is unchanged from prior studies"    1/23/17- CT Chest reveals "Stable pulmonary nodules, likely benign. No new pulmonary parenchymal abnormalities are identified. Enlarging nodular foci in partially visualized left upper abdomen. Peritoneal implants not excluded. Multiple thyroid nodules, measuring up to 1.6 cm."    4/5/17-MRI abdomen reveals "Again seen are the T2 hyperintense hepatic and splenic lesions that appear similar to prior exam and possibly represents hemangiomas. There are right renal cysts and the patient is status post left nephrectomy."    7/3/17- MRI abdomen "Left nephrectomy.  Right renal cysts with no malignant features.Liver and splenic lesions consistent with cysts versus hemangiomas.Cholelithiasis.Right pleural effusion and right basal atelectasis.Small pericardial effusion.Body wall edema and mesenteric edema."  7/3/17- CT chest "In this patient with a history of left renal cell carcinoma, there are 2 right lung nodules which are stable compared to January 2017.  2 left lung nodules seen on that study are not well-seen today, may be related to a small amount of pleural fluid.Bilateral pleural effusions, mildly decreased compared to prior study.Small amount of pericardial fluid, new."    Review of Systems   Constitutional: Negative for activity change, appetite change, chills, fatigue and fever.   HENT: Negative for congestion, hearing loss, mouth sores, sore throat, tinnitus and voice change.    Eyes: Negative for pain and visual disturbance.   Respiratory: Negative for cough, shortness of " breath and wheezing.    Cardiovascular: Negative for chest pain, palpitations and leg swelling.   Gastrointestinal: Negative for abdominal pain, constipation, diarrhea, nausea and vomiting.   Endocrine: Negative for cold intolerance and heat intolerance.   Genitourinary: Negative for difficulty urinating, dyspareunia, dysuria, frequency, menstrual problem, urgency, vaginal bleeding, vaginal discharge and vaginal pain.   Musculoskeletal: Negative for arthralgias and myalgias.   Skin: Negative for color change, rash and wound.   Allergic/Immunologic: Negative for environmental allergies and food allergies.   Neurological: Negative for weakness, numbness and headaches.   Hematological: Negative for adenopathy. Does not bruise/bleed easily.   Psychiatric/Behavioral: Negative for agitation, confusion, hallucinations and sleep disturbance. The patient is not nervous/anxious.    All other systems reviewed and are negative.      Allergies:  Review of patient's allergies indicates:   Allergen Reactions    Allopurinol      Other reaction(s): abnormal transaminases       Medications:  Current Outpatient Prescriptions   Medication Sig Dispense Refill    acetaminophen (TYLENOL) 500 MG tablet Take 500 mg by mouth every 6 (six) hours as needed for Pain.      aluminum-magnesium hydroxide-simethicone (MAALOX) 200-200-20 mg/5 mL Susp Take 30 mLs by mouth every 6 (six) hours as needed.       amLODIPine (NORVASC) 5 MG tablet Take 1 tablet (5 mg total) by mouth once daily. 30 tablet 5    cabozantinib 60 mg Tab Take 60 mg by mouth once daily. 30 tablet 11    febuxostat (ULORIC) 40 mg Tab Take 1 tablet (40 mg total) by mouth once daily. 30 tablet 4    furosemide (LASIX) 40 MG tablet Take 1 tablet (40 mg total) by mouth daily as needed (weight gain of 3 lbs in 24 hrs or 5 lbs in 1 week). 30 tablet 11    hydrALAZINE (APRESOLINE) 100 MG tablet Take 1 tablet (100 mg total) by mouth every 12 (twelve) hours. 90 tablet 3    isosorbide  mononitrate (IMDUR) 30 MG 24 hr tablet Take 1 tablet (30 mg total) by mouth once daily. 30 tablet 11    losartan (COZAAR) 100 MG tablet Take 1 tablet (100 mg total) by mouth once daily. 30 tablet 5    metoprolol succinate (TOPROL-XL) 50 MG 24 hr tablet Take 1 tablet (50 mg total) by mouth once daily. 30 tablet 5    multivitamin-Ca-iron-minerals (ONE-A-DAY WOMENS FORMULA) 27-0.4 mg Tab Take by mouth. 1 Tablet Oral Every day      ondansetron (ZOFRAN-ODT) 8 MG TbDL Take 1 tablet (8 mg total) by mouth every 8 (eight) hours as needed. 30 tablet 1    psyllium husk-calcium (METAMUCIL PLUS CALCIUM) 1-60 gram-mg Cap Take by mouth. 2 Capsule Oral Every morning      sevelamer HCl (RENAGEL) 800 MG Tab Take 2 tablets (1,600 mg total) by mouth 3 (three) times daily with meals. 180 tablet 11     No current facility-administered medications for this visit.        PMH:  Past Medical History:   Diagnosis Date    Anemia     Bronchitis 03/01/2017    Cancer 2016    kidney cancer    CMV (cytomegalovirus) antibody positive     Essential hypertension 9/23/2015    H/O herpes simplex type 2 infection     Herpes simplex type 1 antibody positive     History of kidney cancer     s/p left nephrectomy 1/2016    Hyperparathyroidism, unspecified     Hyperuricemia without signs of inflammatory arthritis and tophaceous disease     Kidney stones     LGSIL (low grade squamous intraepithelial dysplasia)     Prediabetes     Proteinuria     Renal disorder     Thyroid nodule     Urate nephropathy        PSH:  Past Surgical History:   Procedure Laterality Date    BREAST CYST EXCISION      COLONOSCOPY N/A 11/12/2015    NEPHRECTOMY-LAPAROSCOPIC Left 01/12/2016    PERITONEAL CATHETER INSERTION      Permacath insertion  01/12/2017    SALPINGOOPHORECTOMY Right 2016    [facility]---DAVINCI    TONSILLECTOMY      TUBAL LIGATION         FamHx:  Family History   Problem Relation Age of Onset    Hypertension Mother     Diabetes Father      Kidney disease Father     Diabetes Maternal Grandfather     No Known Problems Sister     No Known Problems Brother     Heart disease Sister     No Known Problems Sister     No Known Problems Brother     Breast cancer Neg Hx     Colon cancer Neg Hx     Cancer Neg Hx     Stroke Neg Hx        SocHx:  Social History     Social History    Marital status:      Spouse name: N/A    Number of children: 2    Years of education: N/A     Occupational History    [employer]     Social History Main Topics    Smoking status: Never Smoker    Smokeless tobacco: Never Used    Alcohol use No      Comment: . 2 children. works at [employer].    Drug use: No    Sexual activity: Yes     Partners: Male     Other Topics Concern    Not on file     Social History Narrative    No narrative on file         Objective:      Physical Exam   Constitutional: She is oriented to person, place, and time. She appears well-developed and well-nourished. No distress.   HENT:   Head: Normocephalic and atraumatic.   Nose: Nose normal.   Mouth/Throat: Oropharynx is clear and moist. No oropharyngeal exudate.   Eyes: Conjunctivae and EOM are normal. Pupils are equal, round, and reactive to light. Right eye exhibits no discharge. Left eye exhibits no discharge.   Neck: Normal range of motion. Neck supple. No tracheal deviation present.   Cardiovascular: Normal rate and regular rhythm.  Exam reveals no gallop and no friction rub.    Murmur heard.  Pulmonary/Chest: Effort normal and breath sounds normal. No respiratory distress. She has no wheezes. She has no rales.   Abdominal: Soft. Bowel sounds are normal. She exhibits no distension. There is no tenderness. There is no rebound and no guarding.   Genitourinary: No breast tenderness, discharge or bleeding.   Musculoskeletal: Normal range of motion. She exhibits no edema or tenderness.   Neurological: She is alert and oriented to person, place, and time. She has  normal reflexes. No cranial nerve deficit. Coordination normal.   Skin: Skin is warm and dry. No rash noted. She is not diaphoretic. No erythema. No pallor.   Psychiatric: She has a normal mood and affect. Her behavior is normal. Thought content normal.   Nursing note and vitals reviewed.      LABS:  WBC   Date Value Ref Range Status   05/09/2018 5.48 3.90 - 12.70 K/uL Final     Hemoglobin   Date Value Ref Range Status   05/09/2018 10.8 (L) 12.0 - 16.0 g/dL Final     Hematocrit   Date Value Ref Range Status   05/09/2018 34.9 (L) 37.0 - 48.5 % Final     Platelets   Date Value Ref Range Status   05/09/2018 162 150 - 350 K/uL Final       Chemistry        Component Value Date/Time     05/09/2018 1219    K 4.4 05/09/2018 1219     05/09/2018 1219    CO2 22 (L) 05/09/2018 1219    BUN 59 (H) 05/09/2018 1219    CREATININE 8.5 (H) 05/09/2018 1219     05/09/2018 1219        Component Value Date/Time    CALCIUM 9.7 05/09/2018 1219    ALKPHOS 174 (H) 05/09/2018 1219    AST 26 05/09/2018 1219    ALT 16 05/09/2018 1219    BILITOT 1.0 05/09/2018 1219            Assessment:       1. Metastatic renal cell carcinoma, left    2. Malignant neoplasm metastatic to peritoneum    3. History of right oophorectomy    4. S/p nephrectomy left    5. Chemotherapy follow-up examination    6. Anemia in ESRD (end-stage renal disease)    7. ESRD on hemodialysis    8. Hypertensive kidney disease with chronic kidney disease, stage 1-4 or unspecified chronic kidney disease    9. Essential hypertension        Plan:     1,2,3,4,5- Metastatic Renal Cell Carcinoma:    - It is unlikely that surgery removed all sites of metastatic disease. Given that this is c/w her previous papillary disease, pt started on oral cabozantinib 60mg daily (for dual VEGF and MET inhibition), especially given recent data that shows cabo is far superior to student in the first line setting.  If pt progresses on this regimen, will switch to nivolumab.      Restaging scans from 7/3/17 and 9/2017 show stable disease and pt tolerating cabozantinib relatively well. We will continue cabozantinib for now. Cytology from thoracentesis did not show malignant cells.Restaging scans show extremely stable disease.     Labs WNL. Will plan to get restaging scans in 2 months.    RTC in 8 weeks with CT Chest, MRI abdomen, labs (CBC,CMP), and to see Dr. Rivera.    6- Mild, will monitor.    7,8,9, Stable. Following with cardiology and PCP to manage.      Patient was also seen and examined by Dr. Rivera. Patient is in agreement with the proposed treatment plan. All questions were answered to the patient's satisfaction. Pt knows to call clinic if anything is needed before the next clinic visit.    Liset Ngo, BARBP-C  Hematology and Medical Oncology      I have reviewed the notes, assessments, and/or procedures performed by the housestaff, as above.  I have personally interviewed and examined the patient at the beside, and rounded with the housestaff. I concur with her/his assessment and plan and the documentation of Eva Bloom.  I, Dr. Ben Rivera, personally spent more than  25 mins during this encounter, greater than 50% was spent in direct counseling and/or coordination of care.     Ben Rivera M.D., M.S., F.A.C.P.  Hematology/Oncology Attending  [facility]

## 2018-05-09 ENCOUNTER — OFFICE VISIT (OUTPATIENT)
Dept: HEMATOLOGY/ONCOLOGY | Facility: CLINIC | Age: 53
End: 2018-05-09
Payer: COMMERCIAL

## 2018-05-09 ENCOUNTER — LAB VISIT (OUTPATIENT)
Dept: LAB | Facility: HOSPITAL | Age: 53
End: 2018-05-09
Attending: INTERNAL MEDICINE
Payer: COMMERCIAL

## 2018-05-09 VITALS
TEMPERATURE: 98 F | BODY MASS INDEX: 21.68 KG/M2 | HEART RATE: 70 BPM | HEIGHT: 64 IN | RESPIRATION RATE: 16 BRPM | SYSTOLIC BLOOD PRESSURE: 157 MMHG | DIASTOLIC BLOOD PRESSURE: 82 MMHG | WEIGHT: 127 LBS | OXYGEN SATURATION: 96 %

## 2018-05-09 DIAGNOSIS — N18.6 ESRD ON HEMODIALYSIS: ICD-10-CM

## 2018-05-09 DIAGNOSIS — Z99.2 ESRD ON HEMODIALYSIS: ICD-10-CM

## 2018-05-09 DIAGNOSIS — N18.4 CKD (CHRONIC KIDNEY DISEASE) STAGE 4, GFR 15-29 ML/MIN: ICD-10-CM

## 2018-05-09 DIAGNOSIS — E04.1 THYROID NODULE: ICD-10-CM

## 2018-05-09 DIAGNOSIS — Z09 CHEMOTHERAPY FOLLOW-UP EXAMINATION: ICD-10-CM

## 2018-05-09 DIAGNOSIS — N17.9 ACUTE ON CHRONIC KIDNEY FAILURE: ICD-10-CM

## 2018-05-09 DIAGNOSIS — C64.2 METASTATIC RENAL CELL CARCINOMA, LEFT: Primary | ICD-10-CM

## 2018-05-09 DIAGNOSIS — I10 ESSENTIAL HYPERTENSION: ICD-10-CM

## 2018-05-09 DIAGNOSIS — Z90.5 S/P NEPHRECTOMY: ICD-10-CM

## 2018-05-09 DIAGNOSIS — N18.9 ACUTE ON CHRONIC KIDNEY FAILURE: ICD-10-CM

## 2018-05-09 DIAGNOSIS — Z90.721 HISTORY OF RIGHT OOPHORECTOMY: ICD-10-CM

## 2018-05-09 DIAGNOSIS — I12.9 HYPERTENSIVE KIDNEY DISEASE WITH CHRONIC KIDNEY DISEASE, STAGE 1-4 OR UNSPECIFIED CHRONIC KIDNEY DISEASE: ICD-10-CM

## 2018-05-09 DIAGNOSIS — N18.6 ANEMIA IN ESRD (END-STAGE RENAL DISEASE): ICD-10-CM

## 2018-05-09 DIAGNOSIS — D63.1 ANEMIA IN ESRD (END-STAGE RENAL DISEASE): ICD-10-CM

## 2018-05-09 DIAGNOSIS — C78.6 MALIGNANT NEOPLASM METASTATIC TO PERITONEUM: ICD-10-CM

## 2018-05-09 DIAGNOSIS — C64.2 MALIGNANT NEOPLASM OF LEFT KIDNEY: ICD-10-CM

## 2018-05-09 LAB
ALBUMIN SERPL BCP-MCNC: 3 G/DL
ALP SERPL-CCNC: 174 U/L
ALT SERPL W/O P-5'-P-CCNC: 16 U/L
ANION GAP SERPL CALC-SCNC: 13 MMOL/L
AST SERPL-CCNC: 26 U/L
BASOPHILS # BLD AUTO: 0.04 K/UL
BASOPHILS NFR BLD: 0.7 %
BILIRUB SERPL-MCNC: 1 MG/DL
BUN SERPL-MCNC: 59 MG/DL
CALCIUM SERPL-MCNC: 9.7 MG/DL
CHLORIDE SERPL-SCNC: 103 MMOL/L
CO2 SERPL-SCNC: 22 MMOL/L
CREAT SERPL-MCNC: 8.5 MG/DL
DIFFERENTIAL METHOD: ABNORMAL
EOSINOPHIL # BLD AUTO: 0.1 K/UL
EOSINOPHIL NFR BLD: 2.4 %
ERYTHROCYTE [DISTWIDTH] IN BLOOD BY AUTOMATED COUNT: 16.1 %
EST. GFR  (AFRICAN AMERICAN): 5.6 ML/MIN/1.73 M^2
EST. GFR  (NON AFRICAN AMERICAN): 4.8 ML/MIN/1.73 M^2
GLUCOSE SERPL-MCNC: 103 MG/DL
HCT VFR BLD AUTO: 34.9 %
HGB BLD-MCNC: 10.8 G/DL
IMM GRANULOCYTES # BLD AUTO: 0.01 K/UL
IMM GRANULOCYTES NFR BLD AUTO: 0.2 %
LYMPHOCYTES # BLD AUTO: 1.3 K/UL
LYMPHOCYTES NFR BLD: 24.1 %
MCH RBC QN AUTO: 32 PG
MCHC RBC AUTO-ENTMCNC: 30.9 G/DL
MCV RBC AUTO: 104 FL
MONOCYTES # BLD AUTO: 0.5 K/UL
MONOCYTES NFR BLD: 9.7 %
NEUTROPHILS # BLD AUTO: 3.5 K/UL
NEUTROPHILS NFR BLD: 62.9 %
NRBC BLD-RTO: 0 /100 WBC
PLATELET # BLD AUTO: 162 K/UL
PMV BLD AUTO: 9.8 FL
POTASSIUM SERPL-SCNC: 4.4 MMOL/L
PROT SERPL-MCNC: 6.8 G/DL
RBC # BLD AUTO: 3.37 M/UL
SODIUM SERPL-SCNC: 138 MMOL/L
WBC # BLD AUTO: 5.48 K/UL

## 2018-05-09 PROCEDURE — 36415 COLL VENOUS BLD VENIPUNCTURE: CPT

## 2018-05-09 PROCEDURE — 99999 PR PBB SHADOW E&M-EST. PATIENT-LVL IV: CPT | Mod: PBBFAC,,, | Performed by: INTERNAL MEDICINE

## 2018-05-09 PROCEDURE — 80053 COMPREHEN METABOLIC PANEL: CPT

## 2018-05-09 PROCEDURE — 85025 COMPLETE CBC W/AUTO DIFF WBC: CPT

## 2018-05-09 PROCEDURE — 3008F BODY MASS INDEX DOCD: CPT | Mod: CPTII,S$GLB,, | Performed by: INTERNAL MEDICINE

## 2018-05-09 PROCEDURE — 99214 OFFICE O/P EST MOD 30 MIN: CPT | Mod: S$GLB,,, | Performed by: INTERNAL MEDICINE

## 2018-05-25 DIAGNOSIS — I10 ESSENTIAL HYPERTENSION: ICD-10-CM

## 2018-05-25 DIAGNOSIS — Z90.5 S/P NEPHRECTOMY: ICD-10-CM

## 2018-05-25 DIAGNOSIS — E04.1 THYROID NODULE: ICD-10-CM

## 2018-05-25 DIAGNOSIS — I12.9 HYPERTENSIVE KIDNEY DISEASE WITH CHRONIC KIDNEY DISEASE, STAGE 1-4 OR UNSPECIFIED CHRONIC KIDNEY DISEASE: ICD-10-CM

## 2018-05-25 DIAGNOSIS — Z51.11 ENCOUNTER FOR CHEMOTHERAPY MANAGEMENT: ICD-10-CM

## 2018-05-25 DIAGNOSIS — Z90.721 HISTORY OF RIGHT OOPHORECTOMY: ICD-10-CM

## 2018-05-25 DIAGNOSIS — N18.6 ESRD ON HEMODIALYSIS: ICD-10-CM

## 2018-05-25 DIAGNOSIS — C64.2 METASTATIC RENAL CELL CARCINOMA, LEFT: ICD-10-CM

## 2018-05-25 DIAGNOSIS — N18.6 ANEMIA IN ESRD (END-STAGE RENAL DISEASE): ICD-10-CM

## 2018-05-25 DIAGNOSIS — Z99.2 ESRD ON HEMODIALYSIS: ICD-10-CM

## 2018-05-25 DIAGNOSIS — R93.1 ABNORMAL ECHOCARDIOGRAM: ICD-10-CM

## 2018-05-25 DIAGNOSIS — D63.1 ANEMIA IN ESRD (END-STAGE RENAL DISEASE): ICD-10-CM

## 2018-05-25 DIAGNOSIS — C78.6 MALIGNANT NEOPLASM METASTATIC TO PERITONEUM: ICD-10-CM

## 2018-05-25 RX ORDER — ISOSORBIDE MONONITRATE 30 MG/1
TABLET, EXTENDED RELEASE ORAL
Qty: 30 TABLET | Refills: 5 | Status: SHIPPED | OUTPATIENT
Start: 2018-05-25 | End: 2018-09-11 | Stop reason: SDUPTHER

## 2018-06-07 RX ORDER — CLONIDINE 0.1 MG/24H
1 PATCH, EXTENDED RELEASE TRANSDERMAL
Qty: 4 PATCH | Refills: 11 | Status: SHIPPED | OUTPATIENT
Start: 2018-06-07 | End: 2018-08-23 | Stop reason: DRUGHIGH

## 2018-06-19 DIAGNOSIS — I10 ESSENTIAL HYPERTENSION: ICD-10-CM

## 2018-06-19 DIAGNOSIS — I12.9 HYPERTENSIVE KIDNEY DISEASE WITH CHRONIC KIDNEY DISEASE, STAGE 1-4 OR UNSPECIFIED CHRONIC KIDNEY DISEASE: ICD-10-CM

## 2018-06-19 RX ORDER — LOSARTAN POTASSIUM 100 MG/1
100 TABLET ORAL DAILY
Qty: 30 TABLET | Refills: 5 | Status: SHIPPED | OUTPATIENT
Start: 2018-06-19 | End: 2018-09-11 | Stop reason: SDUPTHER

## 2018-06-19 RX ORDER — HYDRALAZINE HYDROCHLORIDE 100 MG/1
100 TABLET, FILM COATED ORAL EVERY 12 HOURS
Qty: 90 TABLET | Refills: 3 | Status: SHIPPED | OUTPATIENT
Start: 2018-06-19 | End: 2018-09-11 | Stop reason: SDUPTHER

## 2018-07-02 ENCOUNTER — HOSPITAL ENCOUNTER (OUTPATIENT)
Dept: RADIOLOGY | Facility: HOSPITAL | Age: 53
Discharge: HOME | End: 2018-07-02
Attending: NURSE PRACTITIONER
Payer: COMMERCIAL

## 2018-07-02 DIAGNOSIS — N18.6 ANEMIA IN ESRD (END-STAGE RENAL DISEASE): ICD-10-CM

## 2018-07-02 DIAGNOSIS — I10 ESSENTIAL HYPERTENSION: ICD-10-CM

## 2018-07-02 DIAGNOSIS — I12.9 HYPERTENSIVE KIDNEY DISEASE WITH CHRONIC KIDNEY DISEASE, STAGE 1-4 OR UNSPECIFIED CHRONIC KIDNEY DISEASE: ICD-10-CM

## 2018-07-02 DIAGNOSIS — Z90.5 S/P NEPHRECTOMY: ICD-10-CM

## 2018-07-02 DIAGNOSIS — C78.6 MALIGNANT NEOPLASM METASTATIC TO PERITONEUM: ICD-10-CM

## 2018-07-02 DIAGNOSIS — C64.2 METASTATIC RENAL CELL CARCINOMA, LEFT: ICD-10-CM

## 2018-07-02 DIAGNOSIS — Z99.2 ESRD ON HEMODIALYSIS: ICD-10-CM

## 2018-07-02 DIAGNOSIS — Z09 CHEMOTHERAPY FOLLOW-UP EXAMINATION: ICD-10-CM

## 2018-07-02 DIAGNOSIS — N18.6 ESRD ON HEMODIALYSIS: ICD-10-CM

## 2018-07-02 DIAGNOSIS — D63.1 ANEMIA IN ESRD (END-STAGE RENAL DISEASE): ICD-10-CM

## 2018-07-02 DIAGNOSIS — Z90.721 HISTORY OF RIGHT OOPHORECTOMY: ICD-10-CM

## 2018-07-02 PROCEDURE — 71250 CT THORAX DX C-: CPT | Mod: 26,,, | Performed by: RADIOLOGY

## 2018-07-02 PROCEDURE — 74181 MRI ABDOMEN W/O CONTRAST: CPT | Mod: 26,,, | Performed by: RADIOLOGY

## 2018-07-02 PROCEDURE — 74181 MRI ABDOMEN W/O CONTRAST: CPT | Mod: TC

## 2018-07-02 PROCEDURE — 71250 CT THORAX DX C-: CPT | Mod: TC

## 2018-07-05 NOTE — PROGRESS NOTES
"Subjective:       Patient ID: Eva Bloom is a 52 y.o. female.    Chief Complaint: RCC    HPI     52 year old female to clinic for 8 week follow up and to review scans/labs for recurrent metastatic renal cell carcinoma. Pt is currently taking cabozantinib daily. Pt is on ESRD with HD every TTS. Patient tolerating treat extremely well and has no complaints today. She denies any issues with SE.    Today, she denies any mouth sores, nausea, vomiting, diarrhea, constipation,weight loss, chest pain, leg swelling, headache, dizziness, or mood changes.    ECOG 1. She is accompanied to clinic alone.    Oncologic History (From Chart and Patient):  Eva Bloom is a 52 y.o. female with ESRD on HD who was found to have two L renal masses. She underwent L lap nephrectomy on 1/12/16. 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 Margins were negative.  Shehealed from surgery well, but then developed a large ovarian mass.  This was removed by gyn along with multiple sites of metastatic disease in the pelvis.  Path was c/w recurrence of RCC.     11/20/16 Pelvic ultrasound reveals "Large heterogeneous cystic and solid mass which appears to arise from the right ovary with worrisome imaging features for malignancy.  Differential diagnosis includes malignant tumors such as serous or mucinous cystadenocarcinoma.  It is important to note that the patient is at risk for ovarian torsion due to the size of the mass.Multiple uterine fibroids are identified with the largest in the uterine fundus."    11/22/16 pathology reveals "FINAL PATHOLOGIC DIAGNOSIS-RIGHT OVARY AND FALLOPIAN TUBE, RIGHT SALPINGO-OOPHORECTOMY:  -Positive for malignancy, high grade carcinoma morphologically and immunohistochemically consistent with metastasis from the patient's known renal cell carcinoma"    1/23/17 MRI abdomen reveals "Status " "post left nephrectomy, the left nephrectomy bed appears normal.There is a small liver lesion and a small splenic lesion, they cannot be fully characterized but they are unchanged from 5/27/16 suggestive of benign lesions.Multiple right kidney cysts.Cholelithiasis.Soft tissue oval density noted within the mesentery adjacent to the aorta right iliac bifurcation, nonspecific, could represent an enlarged lymph node, it is unchanged from prior studies"    1/23/17- CT Chest reveals "Stable pulmonary nodules, likely benign. No new pulmonary parenchymal abnormalities are identified. Enlarging nodular foci in partially visualized left upper abdomen. Peritoneal implants not excluded. Multiple thyroid nodules, measuring up to 1.6 cm."    4/5/17-MRI abdomen reveals "Again seen are the T2 hyperintense hepatic and splenic lesions that appear similar to prior exam and possibly represents hemangiomas. There are right renal cysts and the patient is status post left nephrectomy."    7/3/17- MRI abdomen "Left nephrectomy.  Right renal cysts with no malignant features.Liver and splenic lesions consistent with cysts versus hemangiomas.Cholelithiasis.Right pleural effusion and right basal atelectasis.Small pericardial effusion.Body wall edema and mesenteric edema."  7/3/17- CT chest "In this patient with a history of left renal cell carcinoma, there are 2 right lung nodules which are stable compared to January 2017.  2 left lung nodules seen on that study are not well-seen today, may be related to a small amount of pleural fluid.Bilateral pleural effusions, mildly decreased compared to prior study.Small amount of pericardial fluid, new."    Review of Systems   Constitutional: Negative for activity change, appetite change, chills, fatigue and fever.   HENT: Negative for congestion, hearing loss, mouth sores, sore throat, tinnitus and voice change.    Eyes: Negative for pain and visual disturbance.   Respiratory: Negative for cough, " shortness of breath and wheezing.    Cardiovascular: Negative for chest pain, palpitations and leg swelling.   Gastrointestinal: Negative for abdominal pain, constipation, diarrhea, nausea and vomiting.   Endocrine: Negative for cold intolerance and heat intolerance.   Genitourinary: Negative for difficulty urinating, dyspareunia, dysuria, frequency, menstrual problem, urgency, vaginal bleeding, vaginal discharge and vaginal pain.   Musculoskeletal: Negative for arthralgias and myalgias.   Skin: Negative for color change, rash and wound.   Allergic/Immunologic: Negative for environmental allergies and food allergies.   Neurological: Negative for weakness, numbness and headaches.   Hematological: Negative for adenopathy. Does not bruise/bleed easily.   Psychiatric/Behavioral: Negative for agitation, confusion, hallucinations and sleep disturbance. The patient is not nervous/anxious.    All other systems reviewed and are negative.      Allergies:  Review of patient's allergies indicates:   Allergen Reactions    Allopurinol      Other reaction(s): abnormal transaminases       Medications:  Current Outpatient Prescriptions   Medication Sig Dispense Refill    acetaminophen (TYLENOL) 500 MG tablet Take 500 mg by mouth every 6 (six) hours as needed for Pain.      aluminum-magnesium hydroxide-simethicone (MAALOX) 200-200-20 mg/5 mL Susp Take 30 mLs by mouth every 6 (six) hours as needed.       amLODIPine (NORVASC) 5 MG tablet Take 1 tablet (5 mg total) by mouth once daily. 30 tablet 5    cabozantinib 60 mg Tab Take 60 mg by mouth once daily. 30 tablet 11    cloNIDine 0.1 mg/24 hr td ptwk (CATAPRES) 0.1 mg/24 hr Place 1 patch onto the skin every 7 days. 4 patch 11    febuxostat (ULORIC) 40 mg Tab Take 1 tablet (40 mg total) by mouth once daily. 30 tablet 4    furosemide (LASIX) 40 MG tablet Take 1 tablet (40 mg total) by mouth daily as needed (weight gain of 3 lbs in 24 hrs or 5 lbs in 1 week). 30 tablet 11     hydrALAZINE (APRESOLINE) 100 MG tablet TAKE 1 TABLET (100 MG TOTAL) BY MOUTH EVERY 12 (TWELVE) HOURS. 90 tablet 3    isosorbide mononitrate (IMDUR) 30 MG 24 hr tablet TAKE 1 TABLET BY MOUTH EVERY DAY 30 tablet 5    losartan (COZAAR) 100 MG tablet TAKE 1 TABLET (100 MG TOTAL) BY MOUTH ONCE DAILY. 30 tablet 5    metoprolol succinate (TOPROL-XL) 50 MG 24 hr tablet Take 1 tablet (50 mg total) by mouth once daily. 30 tablet 5    multivitamin-Ca-iron-minerals (ONE-A-DAY WOMENS FORMULA) 27-0.4 mg Tab Take by mouth. 1 Tablet Oral Every day      ondansetron (ZOFRAN-ODT) 8 MG TbDL Take 1 tablet (8 mg total) by mouth every 8 (eight) hours as needed. 30 tablet 1    psyllium husk-calcium (METAMUCIL PLUS CALCIUM) 1-60 gram-mg Cap Take by mouth. 2 Capsule Oral Every morning      sevelamer HCl (RENAGEL) 800 MG Tab Take 2 tablets (1,600 mg total) by mouth 3 (three) times daily with meals. 180 tablet 11     No current facility-administered medications for this visit.        PMH:  Past Medical History:   Diagnosis Date    Anemia     Bronchitis 03/01/2017    Cancer 2016    kidney cancer    CMV (cytomegalovirus) antibody positive     Essential hypertension 9/23/2015    H/O herpes simplex type 2 infection     Herpes simplex type 1 antibody positive     History of kidney cancer     s/p left nephrectomy 1/2016    Hyperparathyroidism, unspecified     Hyperuricemia without signs of inflammatory arthritis and tophaceous disease     Kidney stones     LGSIL (low grade squamous intraepithelial dysplasia)     Prediabetes     Proteinuria     Renal disorder     Thyroid nodule     Urate nephropathy        PSH:  Past Surgical History:   Procedure Laterality Date    BREAST CYST EXCISION      COLONOSCOPY N/A 11/12/2015    NEPHRECTOMY-LAPAROSCOPIC Left 01/12/2016    PERITONEAL CATHETER INSERTION      Permacath insertion  01/12/2017    SALPINGOOPHORECTOMY Right 2016    K[facility]---DAVINCI    TONSILLECTOMY      TUBAL LIGATION          FamHx:  Family History   Problem Relation Age of Onset    Hypertension Mother     Diabetes Father     Kidney disease Father     Diabetes Maternal Grandfather     No Known Problems Sister     No Known Problems Brother     Heart disease Sister     No Known Problems Sister     No Known Problems Brother     Breast cancer Neg Hx     Colon cancer Neg Hx     Cancer Neg Hx     Stroke Neg Hx        SocHx:  Social History     Social History    Marital status:      Spouse name: N/A    Number of children: 2    Years of education: N/A     Occupational History    [employer] #911     Social History Main Topics    Smoking status: Never Smoker    Smokeless tobacco: Never Used    Alcohol use No      Comment: . 2 children. works at [employer].    Drug use: No    Sexual activity: Yes     Partners: Male     Other Topics Concern    Not on file     Social History Narrative    No narrative on file         Objective:      Physical Exam   Constitutional: She is oriented to person, place, and time. She appears well-developed and well-nourished. No distress.   HENT:   Head: Normocephalic and atraumatic.   Nose: Nose normal.   Mouth/Throat: Oropharynx is clear and moist. No oropharyngeal exudate.   Eyes: Conjunctivae and EOM are normal. Pupils are equal, round, and reactive to light. Right eye exhibits no discharge. Left eye exhibits no discharge.   Neck: Normal range of motion. Neck supple. No tracheal deviation present.   Cardiovascular: Normal rate and regular rhythm.  Exam reveals no gallop and no friction rub.    Murmur heard.  Pulmonary/Chest: Effort normal and breath sounds normal. No respiratory distress. She has no wheezes. She has no rales.   Abdominal: Soft. Bowel sounds are normal. She exhibits no distension. There is no tenderness. There is no rebound and no guarding.   Genitourinary: No breast tenderness, discharge or bleeding.   Musculoskeletal: Normal range of motion. She  exhibits no edema or tenderness.   Neurological: She is alert and oriented to person, place, and time. She has normal reflexes. No cranial nerve deficit. Coordination normal.   Skin: Skin is warm and dry. No rash noted. She is not diaphoretic. No erythema. No pallor.   Psychiatric: She has a normal mood and affect. Her behavior is normal. Thought content normal.   Nursing note and vitals reviewed.      LABS:  WBC   Date Value Ref Range Status   07/06/2018 5.09 3.90 - 12.70 K/uL Final     Hemoglobin   Date Value Ref Range Status   07/06/2018 11.2 (L) 12.0 - 16.0 g/dL Final     Hematocrit   Date Value Ref Range Status   07/06/2018 36.0 (L) 37.0 - 48.5 % Final     Platelets   Date Value Ref Range Status   07/06/2018 142 (L) 150 - 350 K/uL Final       Chemistry        Component Value Date/Time     07/06/2018 1259    K 4.6 07/06/2018 1259     07/06/2018 1259    CO2 26 07/06/2018 1259    BUN 58 (H) 07/06/2018 1259    CREATININE 9.3 (H) 07/06/2018 1259     (H) 07/06/2018 1259        Component Value Date/Time    CALCIUM 9.9 07/06/2018 1259    ALKPHOS 148 (H) 07/06/2018 1259    AST 32 07/06/2018 1259    ALT 26 07/06/2018 1259    BILITOT 1.0 07/06/2018 1259            Assessment:       1. Metastatic renal cell carcinoma, left    2. Malignant neoplasm metastatic to peritoneum    3. History of right oophorectomy    4. S/p nephrectomy left    5. Chemotherapy follow-up examination    6. Anemia in ESRD (end-stage renal disease)    7. ESRD on hemodialysis    8. Hypertensive kidney disease with chronic kidney disease, stage 1-4 or unspecified chronic kidney disease    9. Essential hypertension        Plan:     1,2,3,4,5- Metastatic Renal Cell Carcinoma:    - It is unlikely that surgery removed all sites of metastatic disease. Given that this is c/w her previous papillary disease, pt started on oral cabozantinib 60mg daily (for dual VEGF and MET inhibition), especially given recent data that shows cabo is far  superior to student in the first line setting.  If pt progresses on this regimen, will switch to nivolumab.     Restaging scans from 72/18 show stable disease and pt tolerating cabozantinib relatively well. We will continue cabozantinib for now.    RTC in 6 weeks with labs (CBC,CMP) and to see me.    6- Mild, will monitor.    7,8,9, Stable. Following with cardiology and PCP to manage.      Liset Ngo NP      I have reviewed the notes, assessments, and/or procedures performed by the nurse practitioner, as above.  I have personally interviewed the patient at the beside, and rounded with the nurse practitioner. I formulated the plan of care.  I concur with her documentation of Eva Bloom.  I, Dr. Ben Rivera, personally spent more than 25 mins during this encounter, greater than 50% was spent in direct counseling and/or coordination of care.     Ben Rivera M.D., M.S., F.A.C.P.  Hematology/Oncology Attending  [facility]

## 2018-07-06 ENCOUNTER — LAB VISIT (OUTPATIENT)
Dept: LAB | Facility: HOSPITAL | Age: 53
End: 2018-07-06
Attending: INTERNAL MEDICINE
Payer: COMMERCIAL

## 2018-07-06 ENCOUNTER — OFFICE VISIT (OUTPATIENT)
Dept: HEMATOLOGY/ONCOLOGY | Facility: CLINIC | Age: 53
End: 2018-07-06
Payer: COMMERCIAL

## 2018-07-06 VITALS
WEIGHT: 127.19 LBS | DIASTOLIC BLOOD PRESSURE: 73 MMHG | TEMPERATURE: 98 F | HEART RATE: 64 BPM | RESPIRATION RATE: 16 BRPM | BODY MASS INDEX: 21.71 KG/M2 | OXYGEN SATURATION: 96 % | SYSTOLIC BLOOD PRESSURE: 161 MMHG | HEIGHT: 64 IN

## 2018-07-06 DIAGNOSIS — I12.9 HYPERTENSIVE KIDNEY DISEASE WITH CHRONIC KIDNEY DISEASE, STAGE 1-4 OR UNSPECIFIED CHRONIC KIDNEY DISEASE: ICD-10-CM

## 2018-07-06 DIAGNOSIS — Z90.5 S/P NEPHRECTOMY: ICD-10-CM

## 2018-07-06 DIAGNOSIS — Z09 CHEMOTHERAPY FOLLOW-UP EXAMINATION: ICD-10-CM

## 2018-07-06 DIAGNOSIS — C78.6 MALIGNANT NEOPLASM METASTATIC TO PERITONEUM: ICD-10-CM

## 2018-07-06 DIAGNOSIS — N18.6 ANEMIA IN ESRD (END-STAGE RENAL DISEASE): ICD-10-CM

## 2018-07-06 DIAGNOSIS — I10 ESSENTIAL HYPERTENSION: ICD-10-CM

## 2018-07-06 DIAGNOSIS — Z90.721 HISTORY OF RIGHT OOPHORECTOMY: ICD-10-CM

## 2018-07-06 DIAGNOSIS — D63.1 ANEMIA IN ESRD (END-STAGE RENAL DISEASE): ICD-10-CM

## 2018-07-06 DIAGNOSIS — C64.2 METASTATIC RENAL CELL CARCINOMA, LEFT: Primary | ICD-10-CM

## 2018-07-06 DIAGNOSIS — N18.6 ESRD ON HEMODIALYSIS: ICD-10-CM

## 2018-07-06 DIAGNOSIS — C64.2 METASTATIC RENAL CELL CARCINOMA, LEFT: ICD-10-CM

## 2018-07-06 DIAGNOSIS — Z99.2 ESRD ON HEMODIALYSIS: ICD-10-CM

## 2018-07-06 LAB
ALBUMIN SERPL BCP-MCNC: 3.1 G/DL
ALP SERPL-CCNC: 148 U/L
ALT SERPL W/O P-5'-P-CCNC: 26 U/L
ANION GAP SERPL CALC-SCNC: 11 MMOL/L
AST SERPL-CCNC: 32 U/L
BILIRUB SERPL-MCNC: 1 MG/DL
BUN SERPL-MCNC: 58 MG/DL
CALCIUM SERPL-MCNC: 9.9 MG/DL
CHLORIDE SERPL-SCNC: 104 MMOL/L
CO2 SERPL-SCNC: 26 MMOL/L
CREAT SERPL-MCNC: 9.3 MG/DL
ERYTHROCYTE [DISTWIDTH] IN BLOOD BY AUTOMATED COUNT: 14.8 %
EST. GFR  (AFRICAN AMERICAN): 5 ML/MIN/1.73 M^2
EST. GFR  (NON AFRICAN AMERICAN): 4.4 ML/MIN/1.73 M^2
GLUCOSE SERPL-MCNC: 161 MG/DL
HCT VFR BLD AUTO: 36 %
HGB BLD-MCNC: 11.2 G/DL
IMM GRANULOCYTES # BLD AUTO: 0.01 K/UL
MCH RBC QN AUTO: 33.4 PG
MCHC RBC AUTO-ENTMCNC: 31.1 G/DL
MCV RBC AUTO: 108 FL
NEUTROPHILS # BLD AUTO: 2.6 K/UL
PLATELET # BLD AUTO: 142 K/UL
PMV BLD AUTO: 10.5 FL
POTASSIUM SERPL-SCNC: 4.6 MMOL/L
PROT SERPL-MCNC: 7.1 G/DL
RBC # BLD AUTO: 3.35 M/UL
SODIUM SERPL-SCNC: 141 MMOL/L
WBC # BLD AUTO: 5.09 K/UL

## 2018-07-06 PROCEDURE — 3008F BODY MASS INDEX DOCD: CPT | Mod: CPTII,S$GLB,, | Performed by: INTERNAL MEDICINE

## 2018-07-06 PROCEDURE — 99999 PR PBB SHADOW E&M-EST. PATIENT-LVL IV: CPT | Mod: PBBFAC,,, | Performed by: INTERNAL MEDICINE

## 2018-07-06 PROCEDURE — 80053 COMPREHEN METABOLIC PANEL: CPT

## 2018-07-06 PROCEDURE — 99214 OFFICE O/P EST MOD 30 MIN: CPT | Mod: S$GLB,,, | Performed by: INTERNAL MEDICINE

## 2018-07-06 PROCEDURE — 85027 COMPLETE CBC AUTOMATED: CPT

## 2018-07-06 PROCEDURE — 36415 COLL VENOUS BLD VENIPUNCTURE: CPT

## 2018-07-11 ENCOUNTER — OFFICE VISIT (OUTPATIENT)
Dept: OPTOMETRY | Facility: CLINIC | Age: 53
End: 2018-07-11
Payer: COMMERCIAL

## 2018-07-11 DIAGNOSIS — H35.033 HYPERTENSIVE RETINOPATHY OF BOTH EYES: Primary | ICD-10-CM

## 2018-07-11 DIAGNOSIS — H35.81 COTTON WOOL SPOTS: ICD-10-CM

## 2018-07-11 PROCEDURE — 92250 FUNDUS PHOTOGRAPHY W/I&R: CPT | Mod: S$GLB,,, | Performed by: OPTOMETRIST

## 2018-07-11 PROCEDURE — 99999 PR PBB SHADOW E&M-EST. PATIENT-LVL II: CPT | Mod: PBBFAC,,, | Performed by: OPTOMETRIST

## 2018-07-11 PROCEDURE — 92004 COMPRE OPH EXAM NEW PT 1/>: CPT | Mod: S$GLB,,, | Performed by: OPTOMETRIST

## 2018-07-11 NOTE — PROGRESS NOTES
Subjective:       Patient ID: Eva Bloom is a 52 y.o. female      Chief Complaint   Patient presents with    Hypertensive Eye Exam     History of Present Illness  Dls: 2 yrs     53 y/o female presents today for hypertensive eye exam.   Pt states no changes in vision. Pt wears single vision glasses for   reading.     No tearing   No itching   No burning   No  Pain   No ha's  No floaters  No flashes     Eye meds:  None        Assessment/Plan:     1. Hypertensive retinopathy of both eyes  2. Cotton wool spots  CWS and flame hemes OD. Discussed with pt effects of HTN on eyes. RTC 3 months for DFE.  - Color Fundus Photography - OU - Both Eyes; Future  - Color Fundus Photography - OU - Both Eyes    Follow-up in about 3 months (around 10/11/2018) for follow up CWS OD.

## 2018-08-16 ENCOUNTER — LAB VISIT (OUTPATIENT)
Dept: LAB | Facility: HOSPITAL | Age: 53
End: 2018-08-16
Payer: COMMERCIAL

## 2018-08-16 ENCOUNTER — OFFICE VISIT (OUTPATIENT)
Dept: HEMATOLOGY/ONCOLOGY | Facility: CLINIC | Age: 53
End: 2018-08-16
Payer: COMMERCIAL

## 2018-08-16 VITALS
BODY MASS INDEX: 21.07 KG/M2 | DIASTOLIC BLOOD PRESSURE: 79 MMHG | WEIGHT: 123.44 LBS | HEIGHT: 64 IN | OXYGEN SATURATION: 97 % | TEMPERATURE: 98 F | HEART RATE: 68 BPM | SYSTOLIC BLOOD PRESSURE: 156 MMHG | RESPIRATION RATE: 16 BRPM

## 2018-08-16 DIAGNOSIS — I10 ESSENTIAL HYPERTENSION: ICD-10-CM

## 2018-08-16 DIAGNOSIS — Z90.5 S/P NEPHRECTOMY: ICD-10-CM

## 2018-08-16 DIAGNOSIS — Z99.2 ESRD ON HEMODIALYSIS: ICD-10-CM

## 2018-08-16 DIAGNOSIS — Z90.721 HISTORY OF RIGHT OOPHORECTOMY: ICD-10-CM

## 2018-08-16 DIAGNOSIS — I12.9 HYPERTENSIVE KIDNEY DISEASE WITH CHRONIC KIDNEY DISEASE, STAGE 1-4 OR UNSPECIFIED CHRONIC KIDNEY DISEASE: ICD-10-CM

## 2018-08-16 DIAGNOSIS — C64.2 METASTATIC RENAL CELL CARCINOMA, LEFT: ICD-10-CM

## 2018-08-16 DIAGNOSIS — C64.2 METASTATIC RENAL CELL CARCINOMA, LEFT: Primary | ICD-10-CM

## 2018-08-16 DIAGNOSIS — N18.6 ANEMIA IN ESRD (END-STAGE RENAL DISEASE): ICD-10-CM

## 2018-08-16 DIAGNOSIS — D63.1 ANEMIA IN ESRD (END-STAGE RENAL DISEASE): ICD-10-CM

## 2018-08-16 DIAGNOSIS — Z09 CHEMOTHERAPY FOLLOW-UP EXAMINATION: ICD-10-CM

## 2018-08-16 DIAGNOSIS — C78.6 MALIGNANT NEOPLASM METASTATIC TO PERITONEUM: ICD-10-CM

## 2018-08-16 DIAGNOSIS — N18.6 ESRD ON HEMODIALYSIS: ICD-10-CM

## 2018-08-16 LAB
ALBUMIN SERPL BCP-MCNC: 3.2 G/DL
ALP SERPL-CCNC: 135 U/L
ALT SERPL W/O P-5'-P-CCNC: 20 U/L
ANION GAP SERPL CALC-SCNC: 12 MMOL/L
AST SERPL-CCNC: 29 U/L
BILIRUB SERPL-MCNC: 1.1 MG/DL
BUN SERPL-MCNC: 35 MG/DL
CALCIUM SERPL-MCNC: 9.3 MG/DL
CHLORIDE SERPL-SCNC: 102 MMOL/L
CO2 SERPL-SCNC: 26 MMOL/L
CREAT SERPL-MCNC: 6.6 MG/DL
ERYTHROCYTE [DISTWIDTH] IN BLOOD BY AUTOMATED COUNT: 13.6 %
EST. GFR  (AFRICAN AMERICAN): 7.6 ML/MIN/1.73 M^2
EST. GFR  (NON AFRICAN AMERICAN): 6.6 ML/MIN/1.73 M^2
GLUCOSE SERPL-MCNC: 111 MG/DL
HCT VFR BLD AUTO: 37.5 %
HGB BLD-MCNC: 12 G/DL
IMM GRANULOCYTES # BLD AUTO: 0.01 K/UL
MCH RBC QN AUTO: 33.1 PG
MCHC RBC AUTO-ENTMCNC: 32 G/DL
MCV RBC AUTO: 104 FL
NEUTROPHILS # BLD AUTO: 1.7 K/UL
PLATELET # BLD AUTO: 105 K/UL
PMV BLD AUTO: 10.3 FL
POTASSIUM SERPL-SCNC: 3.8 MMOL/L
PROT SERPL-MCNC: 7 G/DL
RBC # BLD AUTO: 3.62 M/UL
SODIUM SERPL-SCNC: 140 MMOL/L
WBC # BLD AUTO: 4.63 K/UL

## 2018-08-16 PROCEDURE — 80053 COMPREHEN METABOLIC PANEL: CPT

## 2018-08-16 PROCEDURE — 36415 COLL VENOUS BLD VENIPUNCTURE: CPT

## 2018-08-16 PROCEDURE — 99214 OFFICE O/P EST MOD 30 MIN: CPT | Mod: S$GLB,,, | Performed by: NURSE PRACTITIONER

## 2018-08-16 PROCEDURE — 99999 PR PBB SHADOW E&M-EST. PATIENT-LVL IV: CPT | Mod: PBBFAC,,, | Performed by: NURSE PRACTITIONER

## 2018-08-16 PROCEDURE — 3008F BODY MASS INDEX DOCD: CPT | Mod: CPTII,S$GLB,, | Performed by: NURSE PRACTITIONER

## 2018-08-16 PROCEDURE — 85027 COMPLETE CBC AUTOMATED: CPT

## 2018-08-16 NOTE — PROGRESS NOTES
"Subjective:       Patient ID: Eva Bloom is a 52 y.o. female.    Chief Complaint: RCC    HPI     52 year old female, patient of Dr. Rivera, to clinic for 6 week follow up and to review labs for recurrent metastatic renal cell carcinoma. Pt is currently taking cabozantinib daily. Pt is on ESRD with HD every TTS. Patient tolerating treat extremely well and has no complaints today. She denies any issues with SE.    Today, she denies any mouth sores, nausea, vomiting, diarrhea, constipation,weight loss, chest pain, leg swelling, headache, dizziness, or mood changes.    ECOG 1. She is accompanied to clinic alone.    Oncologic History (From Chart and Patient):  Eva Bloom is a 52 y.o. female with ESRD on HD who was found to have two L renal masses. She underwent L lap nephrectomy on 1/12/16. 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 Margins were negative.  Shehealed from surgery well, but then developed a large ovarian mass.  This was removed by gyn along with multiple sites of metastatic disease in the pelvis.  Path was c/w recurrence of RCC.     11/20/16 Pelvic ultrasound reveals "Large heterogeneous cystic and solid mass which appears to arise from the right ovary with worrisome imaging features for malignancy.  Differential diagnosis includes malignant tumors such as serous or mucinous cystadenocarcinoma.  It is important to note that the patient is at risk for ovarian torsion due to the size of the mass.Multiple uterine fibroids are identified with the largest in the uterine fundus."    11/22/16 pathology reveals "FINAL PATHOLOGIC DIAGNOSIS-RIGHT OVARY AND FALLOPIAN TUBE, RIGHT SALPINGO-OOPHORECTOMY:  -Positive for malignancy, high grade carcinoma morphologically and immunohistochemically consistent with metastasis from the patient's known renal cell carcinoma"    1/23/17 MRI abdomen " "reveals "Status post left nephrectomy, the left nephrectomy bed appears normal.There is a small liver lesion and a small splenic lesion, they cannot be fully characterized but they are unchanged from 5/27/16 suggestive of benign lesions.Multiple right kidney cysts.Cholelithiasis.Soft tissue oval density noted within the mesentery adjacent to the aorta right iliac bifurcation, nonspecific, could represent an enlarged lymph node, it is unchanged from prior studies"    1/23/17- CT Chest reveals "Stable pulmonary nodules, likely benign. No new pulmonary parenchymal abnormalities are identified. Enlarging nodular foci in partially visualized left upper abdomen. Peritoneal implants not excluded. Multiple thyroid nodules, measuring up to 1.6 cm."    4/5/17-MRI abdomen reveals "Again seen are the T2 hyperintense hepatic and splenic lesions that appear similar to prior exam and possibly represents hemangiomas. There are right renal cysts and the patient is status post left nephrectomy."    7/3/17- MRI abdomen "Left nephrectomy.  Right renal cysts with no malignant features.Liver and splenic lesions consistent with cysts versus hemangiomas.Cholelithiasis.Right pleural effusion and right basal atelectasis.Small pericardial effusion.Body wall edema and mesenteric edema."  7/3/17- CT chest "In this patient with a history of left renal cell carcinoma, there are 2 right lung nodules which are stable compared to January 2017.  2 left lung nodules seen on that study are not well-seen today, may be related to a small amount of pleural fluid.Bilateral pleural effusions, mildly decreased compared to prior study.Small amount of pericardial fluid, new."    Review of Systems   Constitutional: Negative for activity change, appetite change, chills, fatigue and fever.   HENT: Negative for congestion, hearing loss, mouth sores, sore throat, tinnitus and voice change.    Eyes: Negative for pain and visual disturbance.   Respiratory: Negative " for cough, shortness of breath and wheezing.    Cardiovascular: Negative for chest pain, palpitations and leg swelling.   Gastrointestinal: Negative for abdominal pain, constipation, diarrhea, nausea and vomiting.   Endocrine: Negative for cold intolerance and heat intolerance.   Genitourinary: Negative for difficulty urinating, dyspareunia, dysuria, frequency, menstrual problem, urgency, vaginal bleeding, vaginal discharge and vaginal pain.   Musculoskeletal: Negative for arthralgias and myalgias.   Skin: Negative for color change, rash and wound.   Allergic/Immunologic: Negative for environmental allergies and food allergies.   Neurological: Negative for weakness, numbness and headaches.   Hematological: Negative for adenopathy. Does not bruise/bleed easily.   Psychiatric/Behavioral: Negative for agitation, confusion, hallucinations and sleep disturbance. The patient is not nervous/anxious.    All other systems reviewed and are negative.      Allergies:  Review of patient's allergies indicates:   Allergen Reactions    Allopurinol      Other reaction(s): abnormal transaminases       Medications:  Current Outpatient Medications   Medication Sig Dispense Refill    acetaminophen (TYLENOL) 500 MG tablet Take 500 mg by mouth every 6 (six) hours as needed for Pain.      aluminum-magnesium hydroxide-simethicone (MAALOX) 200-200-20 mg/5 mL Susp Take 30 mLs by mouth every 6 (six) hours as needed.       amLODIPine (NORVASC) 5 MG tablet Take 1 tablet (5 mg total) by mouth once daily. 30 tablet 5    cabozantinib 60 mg Tab Take 60 mg by mouth once daily. 30 tablet 11    cloNIDine 0.1 mg/24 hr td ptwk (CATAPRES) 0.1 mg/24 hr Place 1 patch onto the skin every 7 days. 4 patch 11    febuxostat (ULORIC) 40 mg Tab Take 1 tablet (40 mg total) by mouth once daily. 30 tablet 4    furosemide (LASIX) 40 MG tablet Take 1 tablet (40 mg total) by mouth daily as needed (weight gain of 3 lbs in 24 hrs or 5 lbs in 1 week). 30 tablet 11     hydrALAZINE (APRESOLINE) 100 MG tablet TAKE 1 TABLET (100 MG TOTAL) BY MOUTH EVERY 12 (TWELVE) HOURS. 90 tablet 3    isosorbide mononitrate (IMDUR) 30 MG 24 hr tablet TAKE 1 TABLET BY MOUTH EVERY DAY 30 tablet 5    losartan (COZAAR) 100 MG tablet TAKE 1 TABLET (100 MG TOTAL) BY MOUTH ONCE DAILY. 30 tablet 5    metoprolol succinate (TOPROL-XL) 50 MG 24 hr tablet Take 1 tablet (50 mg total) by mouth once daily. 30 tablet 5    multivitamin-Ca-iron-minerals (ONE-A-DAY WOMENS FORMULA) 27-0.4 mg Tab Take by mouth. 1 Tablet Oral Every day      ondansetron (ZOFRAN-ODT) 8 MG TbDL Take 1 tablet (8 mg total) by mouth every 8 (eight) hours as needed. 30 tablet 1    psyllium husk-calcium (METAMUCIL PLUS CALCIUM) 1-60 gram-mg Cap Take by mouth. 2 Capsule Oral Every morning      sevelamer HCl (RENAGEL) 800 MG Tab Take 2 tablets (1,600 mg total) by mouth 3 (three) times daily with meals. 180 tablet 11     No current facility-administered medications for this visit.        PMH:  Past Medical History:   Diagnosis Date    Anemia     Bronchitis 03/01/2017    Cancer 2016    kidney cancer    CMV (cytomegalovirus) antibody positive     Essential hypertension 9/23/2015    H/O herpes simplex type 2 infection     Herpes simplex type 1 antibody positive     History of kidney cancer     s/p left nephrectomy 1/2016    Hyperparathyroidism, unspecified     Hyperuricemia without signs of inflammatory arthritis and tophaceous disease     Kidney stones     LGSIL (low grade squamous intraepithelial dysplasia)     Prediabetes     Proteinuria     Renal disorder     Thyroid nodule     Urate nephropathy        PSH:  Past Surgical History:   Procedure Laterality Date    BREAST CYST EXCISION      NEPHRECTOMY-LAPAROSCOPIC Left 01/12/2016    PERITONEAL CATHETER INSERTION      Permacath insertion  01/12/2017    SALPINGOOPHORECTOMY Right 2016    [facility]---DAVINCI    TONSILLECTOMY      TUBAL LIGATION         FamHx:  Family History    Problem Relation Age of Onset    Hypertension Mother     Diabetes Father     Kidney disease Father     No Known Problems Son     No Known Problems Son     Diabetes Maternal Grandfather     No Known Problems Sister     No Known Problems Brother     No Known Problems Maternal Grandmother     No Known Problems Paternal Grandmother     No Known Problems Paternal Grandfather     Heart disease Sister     No Known Problems Sister     No Known Problems Brother     No Known Problems Maternal Aunt     No Known Problems Maternal Uncle     No Known Problems Paternal Aunt     No Known Problems Paternal Uncle     Breast cancer Neg Hx     Colon cancer Neg Hx     Cancer Neg Hx     Stroke Neg Hx     Amblyopia Neg Hx     Blindness Neg Hx     Cataracts Neg Hx     Glaucoma Neg Hx     Macular degeneration Neg Hx     Retinal detachment Neg Hx     Strabismus Neg Hx     Thyroid disease Neg Hx        SocHx:  Social History     Socioeconomic History    Marital status:      Spouse name: Not on file    Number of children: 2    Years of education: Not on file    Highest education level: Not on file   Social Needs    Financial resource strain: Not on file    Food insecurity - worry: Not on file    Food insecurity - inability: Not on file    Transportation needs - medical: Not on file    Transportation needs - non-medical: Not on file   Occupational History    Occupation: [employer]     Employer: [id number]   Tobacco Use    Smoking status: Never Smoker    Smokeless tobacco: Never Used   Substance and Sexual Activity    Alcohol use: No     Comment: . 2 children. works at [employer].    Drug use: No    Sexual activity: Yes     Partners: Male   Other Topics Concern    Not on file   Social History Narrative    Not on file         Objective:      Physical Exam   Constitutional: She is oriented to person, place, and time. She appears well-developed and well-nourished. No distress.   HENT:    Head: Normocephalic and atraumatic.   Nose: Nose normal.   Mouth/Throat: Oropharynx is clear and moist. No oropharyngeal exudate.   Eyes: Conjunctivae and EOM are normal. Pupils are equal, round, and reactive to light. Right eye exhibits no discharge. Left eye exhibits no discharge.   Neck: Normal range of motion. Neck supple. No tracheal deviation present.   Cardiovascular: Normal rate and regular rhythm. Exam reveals no gallop and no friction rub.   Murmur heard.  Pulmonary/Chest: Effort normal and breath sounds normal. No respiratory distress. She has no wheezes. She has no rales.   Abdominal: Soft. Bowel sounds are normal. She exhibits no distension. There is no tenderness. There is no rebound and no guarding.   Genitourinary: No breast tenderness, discharge or bleeding.   Musculoskeletal: Normal range of motion. She exhibits no edema or tenderness.   Neurological: She is alert and oriented to person, place, and time. She has normal reflexes. No cranial nerve deficit. Coordination normal.   Skin: Skin is warm and dry. No rash noted. She is not diaphoretic. No erythema. No pallor.   Psychiatric: She has a normal mood and affect. Her behavior is normal. Thought content normal.   Nursing note and vitals reviewed.      LABS:  WBC   Date Value Ref Range Status   08/16/2018 4.63 3.90 - 12.70 K/uL Final     Hemoglobin   Date Value Ref Range Status   08/16/2018 12.0 12.0 - 16.0 g/dL Final     Hematocrit   Date Value Ref Range Status   08/16/2018 37.5 37.0 - 48.5 % Final     Platelets   Date Value Ref Range Status   08/16/2018 105 (L) 150 - 350 K/uL Final       Chemistry        Component Value Date/Time     08/16/2018 1330    K 3.8 08/16/2018 1330     08/16/2018 1330    CO2 26 08/16/2018 1330    BUN 35 (H) 08/16/2018 1330    CREATININE 6.6 (H) 08/16/2018 1330     (H) 08/16/2018 1330        Component Value Date/Time    CALCIUM 9.3 08/16/2018 1330    ALKPHOS 135 08/16/2018 1330    AST 29 08/16/2018  1330    ALT 20 08/16/2018 1330    BILITOT 1.1 (H) 08/16/2018 1330            Assessment:       1. Metastatic renal cell carcinoma, left    2. Malignant neoplasm metastatic to peritoneum    3. History of right oophorectomy    4. S/p nephrectomy left    5. Chemotherapy follow-up examination    6. Anemia in ESRD (end-stage renal disease)    7. ESRD on hemodialysis    8. Hypertensive kidney disease with chronic kidney disease, stage 1-4 or unspecified chronic kidney disease        Plan:     1,2,3,4,5- Metastatic Renal Cell Carcinoma:    - It is unlikely that surgery removed all sites of metastatic disease. Given that this is c/w her previous papillary disease, pt started on oral cabozantinib 60mg daily (for dual VEGF and MET inhibition), especially given recent data that shows cabo is far superior to student in the first line setting.  If pt progresses on this regimen, will switch to nivolumab.     Restaging scans from 72/18 show stable disease and pt tolerating cabozantinib relatively well. We will continue cabozantinib for now.    RTC in 6 weeks with abdominal MRI, CT chest, labs (CBC,CMP) and to see Dr. Rivera.    6- Resolved.    7,8,9, Stable. Following with cardiology and PCP to manage.      Patient is in agreement with the proposed treatment plan. All questions were answered to the patient's satisfaction. Pt knows to call clinic if anything is needed before the next clinic visit.    NAS Telles  Hematology and Medical Oncology

## 2018-08-21 ENCOUNTER — TELEPHONE (OUTPATIENT)
Dept: FAMILY MEDICINE | Facility: CLINIC | Age: 53
End: 2018-08-21

## 2018-08-21 NOTE — TELEPHONE ENCOUNTER
----- Message from Kaylin Huerta sent at 8/21/2018 10:56 AM CDT -----  Contact: Self   Patient need Mammogram orders. Please call patient at [phone].

## 2018-08-21 NOTE — TELEPHONE ENCOUNTER
Spoke with pt advised that she is isnt due for a mammogram until 10/2/18. Pt states she will call back at end of September to schedule.

## 2018-08-23 RX ORDER — CLONIDINE 0.2 MG/24H
1 PATCH, EXTENDED RELEASE TRANSDERMAL
Qty: 4 PATCH | Refills: 3 | Status: SHIPPED | OUTPATIENT
Start: 2018-08-23 | End: 2018-09-11 | Stop reason: SDUPTHER

## 2018-09-11 ENCOUNTER — TELEPHONE (OUTPATIENT)
Dept: NEPHROLOGY | Facility: CLINIC | Age: 53
End: 2018-09-11

## 2018-09-11 DIAGNOSIS — Z90.721 HISTORY OF RIGHT OOPHORECTOMY: ICD-10-CM

## 2018-09-11 DIAGNOSIS — Z51.11 ENCOUNTER FOR CHEMOTHERAPY MANAGEMENT: ICD-10-CM

## 2018-09-11 DIAGNOSIS — Z90.5 S/P NEPHRECTOMY: ICD-10-CM

## 2018-09-11 DIAGNOSIS — C78.6 MALIGNANT NEOPLASM METASTATIC TO PERITONEUM: ICD-10-CM

## 2018-09-11 DIAGNOSIS — Z99.2 ESRD ON HEMODIALYSIS: ICD-10-CM

## 2018-09-11 DIAGNOSIS — N18.6 ANEMIA IN ESRD (END-STAGE RENAL DISEASE): ICD-10-CM

## 2018-09-11 DIAGNOSIS — D63.1 ANEMIA IN ESRD (END-STAGE RENAL DISEASE): ICD-10-CM

## 2018-09-11 DIAGNOSIS — E04.1 THYROID NODULE: ICD-10-CM

## 2018-09-11 DIAGNOSIS — I12.9 HYPERTENSIVE KIDNEY DISEASE WITH CHRONIC KIDNEY DISEASE, STAGE 1-4 OR UNSPECIFIED CHRONIC KIDNEY DISEASE: ICD-10-CM

## 2018-09-11 DIAGNOSIS — C64.2 METASTATIC RENAL CELL CARCINOMA, LEFT: ICD-10-CM

## 2018-09-11 DIAGNOSIS — N18.6 ESRD ON HEMODIALYSIS: ICD-10-CM

## 2018-09-11 DIAGNOSIS — I10 ESSENTIAL HYPERTENSION: ICD-10-CM

## 2018-09-11 DIAGNOSIS — R93.1 ABNORMAL ECHOCARDIOGRAM: ICD-10-CM

## 2018-09-11 RX ORDER — SEVELAMER HYDROCHLORIDE 800 MG/1
1600 TABLET, FILM COATED ORAL
Qty: 600 TABLET | Refills: 3 | Status: SHIPPED | OUTPATIENT
Start: 2018-09-11 | End: 2020-06-01

## 2018-09-11 RX ORDER — METOPROLOL SUCCINATE 50 MG/1
50 TABLET, EXTENDED RELEASE ORAL DAILY
Qty: 90 TABLET | Refills: 3 | Status: SHIPPED | OUTPATIENT
Start: 2018-09-11 | End: 2019-12-20

## 2018-09-11 RX ORDER — LOSARTAN POTASSIUM 100 MG/1
100 TABLET ORAL DAILY
Qty: 90 TABLET | Refills: 3 | Status: SHIPPED | OUTPATIENT
Start: 2018-09-11 | End: 2018-11-19 | Stop reason: SDUPTHER

## 2018-09-11 RX ORDER — ISOSORBIDE MONONITRATE 30 MG/1
30 TABLET, EXTENDED RELEASE ORAL DAILY
Qty: 90 TABLET | Refills: 3 | Status: SHIPPED | OUTPATIENT
Start: 2018-09-11 | End: 2019-10-23 | Stop reason: SDUPTHER

## 2018-09-11 RX ORDER — AMLODIPINE BESYLATE 5 MG/1
5 TABLET ORAL DAILY
Qty: 90 TABLET | Refills: 3 | Status: SHIPPED | OUTPATIENT
Start: 2018-09-11 | End: 2019-09-03 | Stop reason: SDUPTHER

## 2018-09-11 RX ORDER — CLONIDINE 0.2 MG/24H
1 PATCH, EXTENDED RELEASE TRANSDERMAL
Qty: 12 PATCH | Refills: 3 | Status: SHIPPED | OUTPATIENT
Start: 2018-09-11 | End: 2019-07-28 | Stop reason: SDUPTHER

## 2018-09-11 RX ORDER — HYDRALAZINE HYDROCHLORIDE 100 MG/1
100 TABLET, FILM COATED ORAL EVERY 12 HOURS
Qty: 180 TABLET | Refills: 3 | Status: SHIPPED | OUTPATIENT
Start: 2018-09-11 | End: 2019-10-24 | Stop reason: SDUPTHER

## 2018-09-18 ENCOUNTER — TELEPHONE (OUTPATIENT)
Dept: VASCULAR SURGERY | Facility: CLINIC | Age: 53
End: 2018-09-18

## 2018-09-18 DIAGNOSIS — N18.6 ESRD (END STAGE RENAL DISEASE) ON DIALYSIS: Primary | ICD-10-CM

## 2018-09-18 DIAGNOSIS — Z99.2 ESRD (END STAGE RENAL DISEASE) ON DIALYSIS: Primary | ICD-10-CM

## 2018-09-18 NOTE — TELEPHONE ENCOUNTER
Patient called stating her nurse at dialysis unit believes HD access may be infected. Appt scheduled with vascular lab and with Dr. Way, pt verified.

## 2018-09-19 ENCOUNTER — HOSPITAL ENCOUNTER (OUTPATIENT)
Dept: VASCULAR SURGERY | Facility: CLINIC | Age: 53
Discharge: HOME | End: 2018-09-19
Attending: SURGERY
Payer: COMMERCIAL

## 2018-09-19 ENCOUNTER — OFFICE VISIT (OUTPATIENT)
Dept: VASCULAR SURGERY | Facility: CLINIC | Age: 53
End: 2018-09-19
Attending: SURGERY
Payer: COMMERCIAL

## 2018-09-19 VITALS
HEIGHT: 64 IN | BODY MASS INDEX: 21.79 KG/M2 | DIASTOLIC BLOOD PRESSURE: 76 MMHG | TEMPERATURE: 98 F | WEIGHT: 127.63 LBS | SYSTOLIC BLOOD PRESSURE: 159 MMHG | HEART RATE: 66 BPM

## 2018-09-19 DIAGNOSIS — Z99.2 END STAGE RENAL FAILURE ON DIALYSIS: Primary | ICD-10-CM

## 2018-09-19 DIAGNOSIS — Z99.2 ESRD (END STAGE RENAL DISEASE) ON DIALYSIS: ICD-10-CM

## 2018-09-19 DIAGNOSIS — N18.6 END STAGE RENAL FAILURE ON DIALYSIS: Primary | ICD-10-CM

## 2018-09-19 DIAGNOSIS — N18.6 ESRD (END STAGE RENAL DISEASE) ON DIALYSIS: ICD-10-CM

## 2018-09-19 DIAGNOSIS — T82.858D AV GRAFT STENOSIS, SUBSEQUENT ENCOUNTER: Primary | ICD-10-CM

## 2018-09-19 PROCEDURE — 99214 OFFICE O/P EST MOD 30 MIN: CPT | Mod: S$GLB,,, | Performed by: SURGERY

## 2018-09-19 PROCEDURE — 99999 PR PBB SHADOW E&M-EST. PATIENT-LVL III: CPT | Mod: PBBFAC,,, | Performed by: SURGERY

## 2018-09-19 PROCEDURE — 93990 DOPPLER FLOW TESTING: CPT | Mod: S$GLB,,, | Performed by: SURGERY

## 2018-09-19 PROCEDURE — 3008F BODY MASS INDEX DOCD: CPT | Mod: CPTII,S$GLB,, | Performed by: SURGERY

## 2018-09-19 NOTE — PROGRESS NOTES
See Dr. Muñoz's prior notse, review of systems, family history and social history are   unchanged.    HISTORY OF PRESENT ILLNESS:  A 52-year-old female with end-stage renal disease,   well known to Dr. Muñoz, status post,  1.  Covered stent placement, left AV fistula outflow tract, 03/06/2017.  2.  Left upper arm 4-7 tapered AV graft on 01/01/2017.    Of note, at the time of surgery was found to have an exceptionally thin and   friable 4 mm brachial vein, which she used outflow and which she returned with   the stenosis that was treated empirically with a Covered stent because of the   risk of rupture.    She now returns due to concern for graft infection per the patient's HD nurse. She states that she has not noticed any changes in her graft; however, her nurse thought she saw small areas of purulence along the graft access sites. She denies any new redness, tenderness, swelling, or drainage, as well as any fever, chills, nausea, or vomiting.     PHYSICAL EXAMINATION:  VITAL SIGNS:  See nursing note.  EXTREMITIES:  Left arm shows a good thrill with minimal pulsatility in the graft.  There is no erythema, swelling, fluctuance, purulence, or drainage of any kind. There is no skin thinning or ulceration.    IMAGING:  Duplex of the graft shows it to be patent with minimal stenosis - Slightly elevated PSV at arterial anastomosis (613 from 484 previously); volume flow 3.1 L/min from 2.9 previously.     Inflow- 231  arterial anastomosis- 613  proximal graft- 455  mid graft- 369  distal graft- 273  venous anastomosis- 297  distal outflow stent-330  venous outflow- 267          volume flow: in ml.min.  3141.     Overall Impression:  Color flow duplex exam reveals a patent left AV graft and distal outflow stent with no evidence of a hemodynamically significant stenosis. However, velocities are mildly elevated at the arterial anastomosis. Volume flow at mid graft level measures 3141   ml./min.    ASSESSMENT:   Well-functioning left upper arm AV graft with Viabahn stent graft outflow treatment and no signs of active infection.     PLAN: Follow up at next scheduled appointment with Dr. Muñoz.      STAFF ADDENDUM    I have reviewed the relevant data and the resident's assessment and agree with the findings and plan as outlined above.    Luiz Way MD  Vascular & Endovascular Surgery

## 2018-09-24 ENCOUNTER — HOSPITAL ENCOUNTER (OUTPATIENT)
Dept: RADIOLOGY | Facility: HOSPITAL | Age: 53
Discharge: HOME | End: 2018-09-24
Attending: NURSE PRACTITIONER
Payer: COMMERCIAL

## 2018-09-24 DIAGNOSIS — C78.6 MALIGNANT NEOPLASM METASTATIC TO PERITONEUM: ICD-10-CM

## 2018-09-24 DIAGNOSIS — C64.2 METASTATIC RENAL CELL CARCINOMA, LEFT: ICD-10-CM

## 2018-09-24 DIAGNOSIS — Z90.721 HISTORY OF RIGHT OOPHORECTOMY: ICD-10-CM

## 2018-09-24 DIAGNOSIS — Z90.5 S/P NEPHRECTOMY: ICD-10-CM

## 2018-09-24 PROCEDURE — 74181 MRI ABDOMEN W/O CONTRAST: CPT | Mod: 26,,, | Performed by: RADIOLOGY

## 2018-09-24 PROCEDURE — 74181 MRI ABDOMEN W/O CONTRAST: CPT | Mod: TC

## 2018-09-24 PROCEDURE — 71250 CT THORAX DX C-: CPT | Mod: 26,,, | Performed by: RADIOLOGY

## 2018-09-24 PROCEDURE — 71250 CT THORAX DX C-: CPT | Mod: TC

## 2018-09-27 NOTE — PROGRESS NOTES
"Subjective:       Patient ID: Eva Bloom is a 52 y.o. female.    Chief Complaint: RCC    HPI     52 year old female, patient of Dr. Rivera, to clinic for 6 week follow up and to review labs/scans for recurrent metastatic renal cell carcinoma. Pt is currently taking cabozantinib daily. Pt is on ESRD with HD every TTS. Patient tolerating treat extremely well and has no complaints today. She denies any issues with SE.    Today, she denies any mouth sores, nausea, vomiting, diarrhea, constipation,weight loss, chest pain, leg swelling, headache, dizziness, or mood changes.    ECOG 1. She is accompanied to clinic alone.    Oncologic History (From Chart and Patient):  Eva Bloom is a 52 y.o. female with ESRD on HD who was found to have two L renal masses. She underwent L lap nephrectomy on 1/12/16. 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 Margins were negative.  Shehealed from surgery well, but then developed a large ovarian mass.  This was removed by gyn along with multiple sites of metastatic disease in the pelvis.  Path was c/w recurrence of RCC.     11/20/16 Pelvic ultrasound reveals "Large heterogeneous cystic and solid mass which appears to arise from the right ovary with worrisome imaging features for malignancy.  Differential diagnosis includes malignant tumors such as serous or mucinous cystadenocarcinoma.  It is important to note that the patient is at risk for ovarian torsion due to the size of the mass.Multiple uterine fibroids are identified with the largest in the uterine fundus."    11/22/16 pathology reveals "FINAL PATHOLOGIC DIAGNOSIS-RIGHT OVARY AND FALLOPIAN TUBE, RIGHT SALPINGO-OOPHORECTOMY:  -Positive for malignancy, high grade carcinoma morphologically and immunohistochemically consistent with metastasis from the patient's known renal cell carcinoma"    1/23/17 MRI " "abdomen reveals "Status post left nephrectomy, the left nephrectomy bed appears normal.There is a small liver lesion and a small splenic lesion, they cannot be fully characterized but they are unchanged from 5/27/16 suggestive of benign lesions.Multiple right kidney cysts.Cholelithiasis.Soft tissue oval density noted within the mesentery adjacent to the aorta right iliac bifurcation, nonspecific, could represent an enlarged lymph node, it is unchanged from prior studies"    1/23/17- CT Chest reveals "Stable pulmonary nodules, likely benign. No new pulmonary parenchymal abnormalities are identified. Enlarging nodular foci in partially visualized left upper abdomen. Peritoneal implants not excluded. Multiple thyroid nodules, measuring up to 1.6 cm."    4/5/17-MRI abdomen reveals "Again seen are the T2 hyperintense hepatic and splenic lesions that appear similar to prior exam and possibly represents hemangiomas. There are right renal cysts and the patient is status post left nephrectomy."    7/3/17- MRI abdomen "Left nephrectomy.  Right renal cysts with no malignant features.Liver and splenic lesions consistent with cysts versus hemangiomas.Cholelithiasis.Right pleural effusion and right basal atelectasis.Small pericardial effusion.Body wall edema and mesenteric edema."  7/3/17- CT chest "In this patient with a history of left renal cell carcinoma, there are 2 right lung nodules which are stable compared to January 2017.  2 left lung nodules seen on that study are not well-seen today, may be related to a small amount of pleural fluid.Bilateral pleural effusions, mildly decreased compared to prior study.Small amount of pericardial fluid, new."    Review of Systems   Constitutional: Negative for activity change, appetite change, chills, fatigue and fever.   HENT: Negative for congestion, hearing loss, mouth sores, sore throat, tinnitus and voice change.    Eyes: Negative for pain and visual disturbance.   Respiratory: " Negative for cough, shortness of breath and wheezing.    Cardiovascular: Negative for chest pain, palpitations and leg swelling.   Gastrointestinal: Negative for abdominal pain, constipation, diarrhea, nausea and vomiting.   Endocrine: Negative for cold intolerance and heat intolerance.   Genitourinary: Negative for difficulty urinating, dyspareunia, dysuria, frequency, menstrual problem, urgency, vaginal bleeding, vaginal discharge and vaginal pain.   Musculoskeletal: Negative for arthralgias and myalgias.   Skin: Negative for color change, rash and wound.   Allergic/Immunologic: Negative for environmental allergies and food allergies.   Neurological: Negative for weakness, numbness and headaches.   Hematological: Negative for adenopathy. Does not bruise/bleed easily.   Psychiatric/Behavioral: Negative for agitation, confusion, hallucinations and sleep disturbance. The patient is not nervous/anxious.    All other systems reviewed and are negative.      Allergies:  Review of patient's allergies indicates:   Allergen Reactions    Allopurinol      Other reaction(s): abnormal transaminases       Medications:  Current Outpatient Medications   Medication Sig Dispense Refill    acetaminophen (TYLENOL) 500 MG tablet Take 500 mg by mouth every 6 (six) hours as needed for Pain.      aluminum-magnesium hydroxide-simethicone (MAALOX) 200-200-20 mg/5 mL Susp Take 30 mLs by mouth every 6 (six) hours as needed.       amLODIPine (NORVASC) 5 MG tablet Take 1 tablet (5 mg total) by mouth once daily. 90 tablet 3    cabozantinib 60 mg Tab Take 60 mg by mouth once daily. 30 tablet 11    cloNIDine 0.2 mg/24 hr td ptwk (CATAPRES) 0.2 mg/24 hr Place 1 patch onto the skin every 7 days. 12 patch 3    febuxostat (ULORIC) 40 mg Tab Take 1 tablet (40 mg total) by mouth once daily. 30 tablet 4    furosemide (LASIX) 40 MG tablet Take 1 tablet (40 mg total) by mouth daily as needed (weight gain of 3 lbs in 24 hrs or 5 lbs in 1 week). 30  tablet 11    hydrALAZINE (APRESOLINE) 100 MG tablet Take 1 tablet (100 mg total) by mouth every 12 (twelve) hours. 180 tablet 3    isosorbide mononitrate (IMDUR) 30 MG 24 hr tablet Take 1 tablet (30 mg total) by mouth once daily. 90 tablet 3    losartan (COZAAR) 100 MG tablet Take 1 tablet (100 mg total) by mouth once daily. 90 tablet 3    metoprolol succinate (TOPROL-XL) 50 MG 24 hr tablet Take 1 tablet (50 mg total) by mouth once daily. 90 tablet 3    multivitamin-Ca-iron-minerals (ONE-A-DAY WOMENS FORMULA) 27-0.4 mg Tab Take by mouth. 1 Tablet Oral Every day      ondansetron (ZOFRAN-ODT) 8 MG TbDL Take 1 tablet (8 mg total) by mouth every 8 (eight) hours as needed. 30 tablet 1    psyllium husk-calcium (METAMUCIL PLUS CALCIUM) 1-60 gram-mg Cap Take by mouth. 2 Capsule Oral Every morning      sevelamer HCl (RENAGEL) 800 MG Tab Take 2 tablets (1,600 mg total) by mouth 3 (three) times daily with meals. 600 tablet 3     No current facility-administered medications for this visit.        PMH:  Past Medical History:   Diagnosis Date    Anemia     Bronchitis 03/01/2017    Cancer 2016    kidney cancer    CMV (cytomegalovirus) antibody positive     Essential hypertension 9/23/2015    H/O herpes simplex type 2 infection     Herpes simplex type 1 antibody positive     History of kidney cancer     s/p left nephrectomy 1/2016    Hyperparathyroidism, unspecified     Hyperuricemia without signs of inflammatory arthritis and tophaceous disease     Kidney stones     LGSIL (low grade squamous intraepithelial dysplasia)     Prediabetes     Proteinuria     Renal disorder     Thyroid nodule     Urate nephropathy        PSH:  Past Surgical History:   Procedure Laterality Date    BREAST CYST EXCISION      COLONOSCOPY N/A 11/12/2015    COLONOSCOPY N/A 11/12/2015    Performed by [facility] at [facility] (4TH FLR)    INSERTION-CATHETER-HEMODIALYSIS Right 1/12/2016    Performed by Joshua Goldberg, MD at  [facility] OR 2ND FLR    [id number] LEFT UPPER EXTREMITY Left 1/12/2017    Performed by FARIDEH Muñoz III, MD at [facility] OR 2ND FLR    INSERTION-PERITONEAL DIALYSIS CATHETER-LAPAROSCOPIC N/A 2/26/2016    Performed by Vaughn Baron MD at [facility] OR 2ND FLR    NEPHRECTOMY-LAPAROSCOPIC Left 01/12/2016    NEPHRECTOMY-LAPAROSCOPIC Left 1/12/2016    Performed by Otis Anderson MD at [facility] OR 2ND FLR    PERITONEAL CATHETER INSERTION      Permacath insertion  01/12/2017    PERMCATH REMOVAL-TUNNELED CVC REMOVAL N/A 4/1/2016    Performed by Figueroa Dsouza MD at [facility] CATH LAB    REMOVAL-CATHETER-PERITONEAL DIALYSIS N/A 1/12/2017    Performed by Vaughn Baron MD at [facility] OR 2ND FLR    ROBOT ASSISTED LAPAROSCOPIC SALPINGO-OOPHERECTOMY Right 11/22/2016    Performed by [facility] S[facility]son Jr[facility] at [facility] OR    SALPINGOOPHORECTOMY Right 2016    KJB---DAVINCI    TONSILLECTOMY      TUBAL LIGATION         FamHx:  Family History   Problem Relation Age of Onset    Hypertension Mother     Diabetes Father     Kidney disease Father     No Known Problems Son     No Known Problems Son     Diabetes Maternal Grandfather     No Known Problems Sister     No Known Problems Brother     No Known Problems Maternal Grandmother     No Known Problems Paternal Grandmother     No Known Problems Paternal Grandfather     Heart disease Sister     No Known Problems Sister     No Known Problems Brother     No Known Problems Maternal Aunt     No Known Problems Maternal Uncle     No Known Problems Paternal Aunt     No Known Problems Paternal Uncle     Breast cancer Neg Hx     Colon cancer Neg Hx     Cancer Neg Hx     Stroke Neg Hx     Amblyopia Neg Hx     Blindness Neg Hx     Cataracts Neg Hx     Glaucoma Neg Hx     Macular degeneration Neg Hx     Retinal detachment Neg Hx     Strabismus Neg Hx     Thyroid disease Neg Hx        SocHx:  Social History     Socioeconomic History    Marital  status:      Spouse name: Not on file    Number of children: 2    Years of education: Not on file    Highest education level: Not on file   Social Needs    Financial resource strain: Not on file    Food insecurity - worry: Not on file    Food insecurity - inability: Not on file    Transportation needs - medical: Not on file    Transportation needs - non-medical: Not on file   Occupational History    Occupation: [employer]     Employer: [id number]   Tobacco Use    Smoking status: Never Smoker    Smokeless tobacco: Never Used   Substance and Sexual Activity    Alcohol use: No     Comment: . 2 children. works at [employer].    Drug use: No    Sexual activity: Yes     Partners: Male   Other Topics Concern    Not on file   Social History Narrative    Not on file         Objective:      Physical Exam   Constitutional: She is oriented to person, place, and time. She appears well-developed and well-nourished. No distress.   HENT:   Head: Normocephalic and atraumatic.   Nose: Nose normal.   Mouth/Throat: Oropharynx is clear and moist. No oropharyngeal exudate.   Eyes: Conjunctivae and EOM are normal. Pupils are equal, round, and reactive to light. Right eye exhibits no discharge. Left eye exhibits no discharge.   Neck: Normal range of motion. Neck supple. No tracheal deviation present.   Cardiovascular: Normal rate and regular rhythm. Exam reveals no gallop and no friction rub.   Murmur heard.  Pulmonary/Chest: Effort normal and breath sounds normal. No respiratory distress. She has no wheezes. She has no rales.   Abdominal: Soft. Bowel sounds are normal. She exhibits no distension. There is no tenderness. There is no rebound and no guarding.   Genitourinary: No breast tenderness, discharge or bleeding.   Musculoskeletal: Normal range of motion. She exhibits no edema or tenderness.   Neurological: She is alert and oriented to person, place, and time. She has normal reflexes. No cranial nerve  deficit. Coordination normal.   Skin: Skin is warm and dry. No rash noted. She is not diaphoretic. No erythema. No pallor.   Psychiatric: She has a normal mood and affect. Her behavior is normal. Thought content normal.   Nursing note and vitals reviewed.      LABS:  WBC   Date Value Ref Range Status   08/16/2018 4.63 3.90 - 12.70 K/uL Final     Hemoglobin   Date Value Ref Range Status   08/16/2018 12.0 12.0 - 16.0 g/dL Final     Hematocrit   Date Value Ref Range Status   08/16/2018 37.5 37.0 - 48.5 % Final     Platelets   Date Value Ref Range Status   08/16/2018 105 (L) 150 - 350 K/uL Final       Chemistry        Component Value Date/Time     08/16/2018 1330    K 3.8 08/16/2018 1330     08/16/2018 1330    CO2 26 08/16/2018 1330    BUN 35 (H) 08/16/2018 1330    CREATININE 6.6 (H) 08/16/2018 1330     (H) 08/16/2018 1330        Component Value Date/Time    CALCIUM 9.3 08/16/2018 1330    ALKPHOS 135 08/16/2018 1330    AST 29 08/16/2018 1330    ALT 20 08/16/2018 1330    BILITOT 1.1 (H) 08/16/2018 1330            Assessment:       1. Metastatic renal cell carcinoma, left    2. Malignant neoplasm metastatic to peritoneum    3. History of right oophorectomy    4. S/p nephrectomy left    5. Chemotherapy follow-up examination    6. Anemia in ESRD (end-stage renal disease)    7. ESRD on hemodialysis    8. Hypertensive kidney disease with chronic kidney disease, stage 1-4 or unspecified chronic kidney disease    9. Essential hypertension        Plan:       1,2,3,4,5- Metastatic Renal Cell Carcinoma:    - It is unlikely that surgery removed all sites of metastatic disease. Given that this is c/w her previous papillary disease, pt started on oral cabozantinib 60mg daily (for dual VEGF and MET inhibition), especially given recent data that shows cabo is far superior to student in the first line setting.  If pt progresses on this regimen, will switch to nivolumab.     Restaging scans show stable disease and pt  tolerating cabozantinib relatively well. We will continue cabozantinib for now.    RTC in 6 weeks with labs (CBC,CMP) and to see Dr. Rivera.    6- Resolved.    7,8,9, Stable. Following with cardiology and PCP to manage.        The patient agrees with the plan, and all questions have been answered to their satisfaction.      More than 25 mins were spent during this encounter, greater than 50% was spent in direct counseling and/or coordination of care.     Ben Rivera M.D., M.S., F.A.C.P.  Hematology and Oncology Attending  [facility]

## 2018-09-28 ENCOUNTER — LAB VISIT (OUTPATIENT)
Dept: LAB | Facility: HOSPITAL | Age: 53
End: 2018-09-28
Attending: INTERNAL MEDICINE
Payer: COMMERCIAL

## 2018-09-28 ENCOUNTER — OFFICE VISIT (OUTPATIENT)
Dept: HEMATOLOGY/ONCOLOGY | Facility: CLINIC | Age: 53
End: 2018-09-28
Payer: COMMERCIAL

## 2018-09-28 ENCOUNTER — TELEPHONE (OUTPATIENT)
Dept: FAMILY MEDICINE | Facility: CLINIC | Age: 53
End: 2018-09-28

## 2018-09-28 VITALS
DIASTOLIC BLOOD PRESSURE: 76 MMHG | BODY MASS INDEX: 21.38 KG/M2 | RESPIRATION RATE: 14 BRPM | SYSTOLIC BLOOD PRESSURE: 157 MMHG | HEIGHT: 64 IN | HEART RATE: 62 BPM | TEMPERATURE: 98 F | WEIGHT: 125.25 LBS

## 2018-09-28 DIAGNOSIS — Z12.31 ENCOUNTER FOR SCREENING MAMMOGRAM FOR BREAST CANCER: Primary | ICD-10-CM

## 2018-09-28 DIAGNOSIS — C78.6 MALIGNANT NEOPLASM METASTATIC TO PERITONEUM: ICD-10-CM

## 2018-09-28 DIAGNOSIS — Z90.721 HISTORY OF RIGHT OOPHORECTOMY: ICD-10-CM

## 2018-09-28 DIAGNOSIS — C64.2 METASTATIC RENAL CELL CARCINOMA, LEFT: ICD-10-CM

## 2018-09-28 DIAGNOSIS — R11.2 CHEMOTHERAPY INDUCED NAUSEA AND VOMITING: ICD-10-CM

## 2018-09-28 DIAGNOSIS — D63.1 ANEMIA IN ESRD (END-STAGE RENAL DISEASE): ICD-10-CM

## 2018-09-28 DIAGNOSIS — Z90.5 S/P NEPHRECTOMY: ICD-10-CM

## 2018-09-28 DIAGNOSIS — T45.1X5A CHEMOTHERAPY INDUCED NAUSEA AND VOMITING: ICD-10-CM

## 2018-09-28 DIAGNOSIS — N18.6 ESRD ON HEMODIALYSIS: ICD-10-CM

## 2018-09-28 DIAGNOSIS — Z99.2 ESRD ON HEMODIALYSIS: ICD-10-CM

## 2018-09-28 DIAGNOSIS — C64.2 METASTATIC RENAL CELL CARCINOMA, LEFT: Primary | ICD-10-CM

## 2018-09-28 DIAGNOSIS — N18.6 ANEMIA IN ESRD (END-STAGE RENAL DISEASE): ICD-10-CM

## 2018-09-28 DIAGNOSIS — Z09 CHEMOTHERAPY FOLLOW-UP EXAMINATION: ICD-10-CM

## 2018-09-28 DIAGNOSIS — I12.9 HYPERTENSIVE KIDNEY DISEASE WITH CHRONIC KIDNEY DISEASE, STAGE 1-4 OR UNSPECIFIED CHRONIC KIDNEY DISEASE: ICD-10-CM

## 2018-09-28 DIAGNOSIS — I10 ESSENTIAL HYPERTENSION: ICD-10-CM

## 2018-09-28 LAB
ALBUMIN SERPL BCP-MCNC: 3.1 G/DL
ALP SERPL-CCNC: 124 U/L
ALT SERPL W/O P-5'-P-CCNC: 24 U/L
ANION GAP SERPL CALC-SCNC: 11 MMOL/L
AST SERPL-CCNC: 35 U/L
BILIRUB SERPL-MCNC: 0.9 MG/DL
BUN SERPL-MCNC: 77 MG/DL
CALCIUM SERPL-MCNC: 10.1 MG/DL
CHLORIDE SERPL-SCNC: 102 MMOL/L
CO2 SERPL-SCNC: 26 MMOL/L
CREAT SERPL-MCNC: 10.2 MG/DL
ERYTHROCYTE [DISTWIDTH] IN BLOOD BY AUTOMATED COUNT: 15.9 %
EST. GFR  (AFRICAN AMERICAN): 4.5 ML/MIN/1.73 M^2
EST. GFR  (NON AFRICAN AMERICAN): 3.9 ML/MIN/1.73 M^2
GLUCOSE SERPL-MCNC: 91 MG/DL
HCT VFR BLD AUTO: 39.4 %
HGB BLD-MCNC: 12.3 G/DL
IMM GRANULOCYTES # BLD AUTO: 0.02 K/UL
MCH RBC QN AUTO: 33.4 PG
MCHC RBC AUTO-ENTMCNC: 31.2 G/DL
MCV RBC AUTO: 107 FL
NEUTROPHILS # BLD AUTO: 2.2 K/UL
PLATELET # BLD AUTO: 199 K/UL
PMV BLD AUTO: 9.9 FL
POTASSIUM SERPL-SCNC: 4.9 MMOL/L
PROT SERPL-MCNC: 7.1 G/DL
RBC # BLD AUTO: 3.68 M/UL
SODIUM SERPL-SCNC: 139 MMOL/L
WBC # BLD AUTO: 5.42 K/UL

## 2018-09-28 PROCEDURE — 85027 COMPLETE CBC AUTOMATED: CPT | Mod: TXP

## 2018-09-28 PROCEDURE — 3008F BODY MASS INDEX DOCD: CPT | Mod: CPTII,S$GLB,TXP, | Performed by: INTERNAL MEDICINE

## 2018-09-28 PROCEDURE — 99214 OFFICE O/P EST MOD 30 MIN: CPT | Mod: S$GLB,TXP,, | Performed by: INTERNAL MEDICINE

## 2018-09-28 PROCEDURE — 36415 COLL VENOUS BLD VENIPUNCTURE: CPT | Mod: NTX

## 2018-09-28 PROCEDURE — 99999 PR PBB SHADOW E&M-EST. PATIENT-LVL IV: CPT | Mod: PBBFAC,TXP,, | Performed by: INTERNAL MEDICINE

## 2018-09-28 PROCEDURE — 80053 COMPREHEN METABOLIC PANEL: CPT | Mod: NTX

## 2018-09-28 RX ORDER — ONDANSETRON 8 MG/1
8 TABLET, ORALLY DISINTEGRATING ORAL EVERY 8 HOURS PRN
Qty: 30 TABLET | Refills: 1 | Status: SHIPPED | OUTPATIENT
Start: 2018-09-28 | End: 2019-09-16 | Stop reason: SDUPTHER

## 2018-09-28 NOTE — TELEPHONE ENCOUNTER
----- Message from Leonardo Hodgson sent at 9/28/2018  3:29 PM CDT -----  Contact: Self/[phone]  The patient would like orders placed for a mammogram. The patient would like to be contacted once the orders are placed.      Thank you

## 2018-10-09 ENCOUNTER — OFFICE VISIT (OUTPATIENT)
Dept: FAMILY MEDICINE | Facility: CLINIC | Age: 53
End: 2018-10-09
Payer: COMMERCIAL

## 2018-10-09 VITALS
HEIGHT: 64 IN | HEART RATE: 75 BPM | SYSTOLIC BLOOD PRESSURE: 144 MMHG | OXYGEN SATURATION: 95 % | DIASTOLIC BLOOD PRESSURE: 72 MMHG | BODY MASS INDEX: 21.07 KG/M2 | WEIGHT: 123.44 LBS | TEMPERATURE: 98 F

## 2018-10-09 DIAGNOSIS — H65.91 FLUID LEVEL BEHIND TYMPANIC MEMBRANE OF RIGHT EAR: ICD-10-CM

## 2018-10-09 DIAGNOSIS — T78.40XA ALLERGIC REACTION, INITIAL ENCOUNTER: Primary | ICD-10-CM

## 2018-10-09 PROCEDURE — 99214 OFFICE O/P EST MOD 30 MIN: CPT | Mod: S$GLB,,, | Performed by: NURSE PRACTITIONER

## 2018-10-09 PROCEDURE — 99999 PR PBB SHADOW E&M-EST. PATIENT-LVL IV: CPT | Mod: PBBFAC,,, | Performed by: NURSE PRACTITIONER

## 2018-10-09 PROCEDURE — 3008F BODY MASS INDEX DOCD: CPT | Mod: CPTII,S$GLB,, | Performed by: NURSE PRACTITIONER

## 2018-10-09 RX ORDER — METHYLPREDNISOLONE 4 MG/1
TABLET ORAL
Qty: 1 PACKAGE | Refills: 0 | Status: SHIPPED | OUTPATIENT
Start: 2018-10-09 | End: 2019-02-25

## 2018-10-09 NOTE — PROGRESS NOTES
Subjective:       Patient ID: Eva Bloom is a 53 y.o. female.    Chief Complaint: Rash (pt states itchy rash all over her body.X 2 days ago ) and Headache (pt states headaches and right ear pain X this morning.)    Rash   This is a new problem. The current episode started in the past 7 days. The problem has been gradually worsening since onset. The rash is diffuse. The rash is characterized by itchiness and redness. She was exposed to a new detergent/soap (gain). Associated symptoms include a sore throat. Pertinent negatives include no fever or shortness of breath. (R ear fullness  ) Past treatments include nothing. The treatment provided no relief. Her past medical history is significant for allergies.       Past Medical History:   Diagnosis Date    Anemia     Bronchitis 03/01/2017    Cancer 2016    kidney cancer    CMV (cytomegalovirus) antibody positive     Essential hypertension 9/23/2015    H/O herpes simplex type 2 infection     Herpes simplex type 1 antibody positive     History of kidney cancer     s/p left nephrectomy 1/2016    Hyperparathyroidism, unspecified     Hyperuricemia without signs of inflammatory arthritis and tophaceous disease     Kidney stones     LGSIL (low grade squamous intraepithelial dysplasia)     Prediabetes     Proteinuria     Renal disorder     Thyroid nodule     Urate nephropathy        Social History     Socioeconomic History    Marital status:      Spouse name: Not on file    Number of children: 2    Years of education: Not on file    Highest education level: Not on file   Social Needs    Financial resource strain: Not on file    Food insecurity - worry: Not on file    Food insecurity - inability: Not on file    Transportation needs - medical: Not on file    Transportation needs - non-medical: Not on file   Occupational History    Occupation: [employer]     Employer: [id number]   Tobacco Use    Smoking status: Never Smoker    Smokeless  "tobacco: Never Used   Substance and Sexual Activity    Alcohol use: No     Comment: . 2 children. works at [employer].    Drug use: No    Sexual activity: Yes     Partners: Male   Other Topics Concern    Not on file   Social History Narrative    Not on file       Past Surgical History:   Procedure Laterality Date    BREAST CYST EXCISION      COLONOSCOPY N/A 11/12/2015    COLONOSCOPY N/A 11/12/2015    Performed by D[facility]re Skinner MD at [facility] ENDO (4TH FLR)    INSERTION-CATHETER-HEMODIALYSIS Right 1/12/2016    Performed by Joshua Goldberg, MD at [facility] OR 2ND FLR    [id number] LEFT UPPER EXTREMITY Left 1/12/2017    Performed by FARIDEH Muñoz III, MD at [facility] OR 2ND FLR    INSERTION-PERITONEAL DIALYSIS CATHETER-LAPAROSCOPIC N/A 2/26/2016    Performed by Vaughn Baron MD at [facility] OR 2ND FLR    NEPHRECTOMY-LAPAROSCOPIC Left 01/12/2016    NEPHRECTOMY-LAPAROSCOPIC Left 1/12/2016    Performed by Otis Anderson MD at [facility] OR 2ND FLR    PERITONEAL CATHETER INSERTION      Permacath insertion  01/12/2017    PERMCATH REMOVAL-TUNNELED CVC REMOVAL N/A 4/1/2016    Performed by Figueroa Dsouza MD at [facility] CATH LAB    REMOVAL-CATHETER-PERITONEAL DIALYSIS N/A 1/12/2017    Performed by Vaughn Baron MD at [facility] OR 2ND FLR    ROBOT ASSISTED LAPAROSCOPIC SALPINGO-OOPHERECTOMY Right 11/22/2016    Per[facility] by [facility] Jr[facility] at [facility] OR    SALPINGOOPHORECTOMY Right 2016    KJB---DAVINCI    TONSILLECTOMY      TUBAL LIGATION         Review of Systems   Constitutional: Negative for chills and fever.   HENT: Positive for ear pain and sore throat.    Respiratory: Negative for shortness of breath.    Skin: Positive for rash.   Allergic/Immunologic: Positive for environmental allergies.       Objective:   BP (!) 144/72 (BP Location: Left arm, Patient Position: Sitting, BP Method: Medium (Manual))   Pulse 75   Temp 98.2 °F (36.8 °C) (Oral)   Ht 5' 4" (1.626 m)   Wt 56 kg " (123 lb 7.3 oz)   LMP 10/24/2016   SpO2 95%   BMI 21.19 kg/m²      Physical Exam   Constitutional: She is oriented to person, place, and time. She appears well-developed and well-nourished.   HENT:   Head: Normocephalic and atraumatic.   Right Ear: Hearing, external ear and ear canal normal. A middle ear effusion is present.   Left Ear: Hearing, tympanic membrane, external ear and ear canal normal.   Nose: Nose normal. Right sinus exhibits no maxillary sinus tenderness and no frontal sinus tenderness. Left sinus exhibits no maxillary sinus tenderness and no frontal sinus tenderness.   Mouth/Throat: Uvula is midline, oropharynx is clear and moist and mucous membranes are normal.   Eyes: Conjunctivae are normal. Pupils are equal, round, and reactive to light.   Neck: Normal range of motion.   Cardiovascular: Normal rate and regular rhythm.   Pulmonary/Chest: Effort normal and breath sounds normal. She has no wheezes. She has no rhonchi. She has no rales.   Musculoskeletal: Normal range of motion.   Lymphadenopathy:        Head (right side): Submandibular adenopathy present.   Neurological: She is alert and oriented to person, place, and time.   Skin: Skin is warm and dry. Capillary refill takes less than 2 seconds. Purpura and rash noted.   Psychiatric: She has a normal mood and affect. Her behavior is normal.       Assessment:       1. Allergic reaction, initial encounter    2. Fluid level behind tympanic membrane of right ear        Plan:       Eva was seen today for rash and headache.    Diagnoses and all orders for this visit:    Allergic reaction, initial encounter  -     methylPREDNISolone (MEDROL DOSEPACK) 4 mg tablet; use as directed    Fluid level behind tympanic membrane of right ear    She is to take benadryl for itching. This will also help with fluid behind the ear.     Follow-up if symptoms worsen or fail to improve.

## 2018-10-09 NOTE — PATIENT INSTRUCTIONS
General Allergic Reactions  An allergic reaction is a set of symptoms caused by an allergen. An allergen is something that causes a persons immune system to react. When a person comes in contact with an allergen, it causes the body to release chemicals. These include the chemical histamine. Histamine causes swelling and itching. It may affect the entire body. This is called a general allergic reaction. Often symptoms affect only 1 part of the body. This is called a local allergic reaction.  You are having an allergic reaction. Almost anything can cause one. Different people are allergic to different things. It is usually something that you ate or swallowed, came into contact with by getting or putting it on your skin or clothes, or something you breathed in the air. This can be very annoying and sometimes scary.  Most of us think of allergic reactions when we have a rash or itchy skin. Symptoms can include:  · Itching of the eyes, nose, and roof of the mouth  · Runny or stuffy nose  · Watery eyes   · Sneezing or coughing   · A blocked feeling in the ear  · Red, itchy rash called hives  · Red and purple spots  · Rash, redness, welts, blisters  · Itching, burning, stinging, pain  · Dry, flaky, cracking, scaly skin  Severe symptoms include:  · Swelling of the face, lips, or other parts of the body  · Hoarse voice  · Trouble swallowing, feeling like your throat is closing  · Trouble breathing, wheezing  · Nausea, vomiting, diarrhea, stomach cramps  · Feeling faint or lightheaded, rapid heart rate  Sometimes the cause may be obvious. But there are so many things that can cause a reaction that you may not be able to figure out. The most important things to help find your allergen are:  · Remembering when it started  · What you were doing at the time or just before that  · Any activities you were involved in  · Any new products or contacts  Below are some common causes. But remember that almost anything can cause a  reaction. You may not even be aware that you came into contact with one of these things:  · Dust, mold, pollen  · Plants (common ones are poison ivy and poison oak, but there are many others)   · Animals  · Foods such as shrimp, shellfish, peanuts, milk products, gluten, and eggs. Also food colorings, flavorings, and additives.  · Insect bites or stings such as bees, mosquitos, fleas, ticks  · Medicines such as penicillin, sulfa medicines, amoxicillin, aspirin, and ibuprofen. But any medicine can cause a reaction.  · Jewelry such as nickel or gold. This can be new, or something youve worn for a while, including zippers and buttons.  · Latex such as in gloves, clothes, toys, balloons, or some tapes. Some people allergic to latex may also have problems with foods like bananas, avocados, kiwi, papaya, or chestnuts.  · Lotions, perfumes, cosmetics, soaps, shampoos, skincare products, nail products  · Chemicals or dyes in clothing, linen, , hair dyes, soaps, iodine  Many viruses and common colds can cause a rash that is not an allergic reaction. Sometimes it is hard to tell the difference between allergies, sensitivity, or an intolerance to something. This is especially true with food. Many things can cause diarrhea, vomiting, stomach cramps, and skin irritation.  Home care    The goal of treatment is to help relieve the symptoms and get you feeling better. The rash will usually fade over several days. But it can sometimes last a couple of weeks. Over the next couple of days, there may be times when it is gets a little worse, and then better again. Here are some things to do:  · If you know what you are allergic to, stay away from it. Future reactions could be worse than this one.  · Avoid tight clothing and anything that heats up your skin (hot showers or baths, direct sunlight). Heat will make itching worse.  · An ice pack will relieve local areas of intense itching and redness. To make an ice pack, put ice  cubes in a plastic bag that seals at the top. Wrap it in a thin, clean towel. Dont put the ice directly on the skin because it can damage the skin.  · Oral diphenhydramine is an over-the-counter antihistamine sold at pharmacy and grocery stores. Unless a prescription antihistamine was given, diphenhydramine may be used to reduce itching if large areas of the skin are involved. It may make you sleepy. So be careful using it in the daytime or when going to school, working, or driving. Note: Dont use diphenhydramine if you have glaucoma or if you are a man with trouble urinating due to an enlarged prostate. There are other antihistamines that wont make you so sleepy. These are good choices for daytime use. Ask your pharmacist for suggestions.  · Dont use diphenhydramine cream on your skin. It can cause a further reaction in some people.  · To help prevent an infection, don't scratch the affected area. Scratching may worsen the reaction and damage your skin. It can also lead to an infection. Always check the affected for signs of an infection.  · Call your healthcare provider and ask what you can use to help decrease the itching.  · To decrease allergic reactions, try the following:    · Use heat-steam to clean your home  · Use high-efficiency particulate (HEPA) vacuums and filters  · Stay away from food and pet triggers  · Kill any cockroaches  · Clean your house often  Follow-up care  Follow up with your healthcare provider, or as advised. If you had a severe reaction today, or if you have had several mild to medium allergic reactions in the past, ask your provider about allergy testing. This can help you find out what you are allergic to. If your reaction included dizziness, fainting, or trouble breathing or swallowing, ask your provider about carrying auto-injectable epinephrine.  Call 911  Call 911 if any of these occur:  · Trouble breathing or swallowing, wheezing  · Cool, moist, pale skin  · Shortness of  breath  · Hoarse voice or trouble speaking  · Confused   · Very drowsy or trouble awakening  · Fainting or loss of consciousness  · Rapid heart rate  · Feeling of dizziness or weakness or a sudden drop in blood pressure  · Feeling of doom  · Feeling lightheaded  · Severe nausea or vomiting, or diarrhea  · Seizure  · Swelling in the face, eyelids, lips, mouth, throat or tongue  · Drooling  When to seek medical advice  Call your healthcare provider right away if any of these occur:  · Spreading areas of itching, redness or swelling  · Nausea or stomach cramps or abdominal pain  · Continuing or recurring symptoms  · Spreading areas of redness, swelling, or itching  · Signs of infection at the affected site:  ¨ Spreading redness  ¨ Increased pain or swelling  ¨ Fluid or colored drainage from the site  ¨ Fever of 100.4°F (38°C) or above lasting for 24 to 48 hours, or as directed by your provider  Date Last Reviewed: 3/1/2017  © 2682-1270 [employer]. [address] [place] [address], [place] [address]. All rights reserved. This information is not intended as a substitute for professional medical care. Always follow your healthcare professional's instructions.

## 2018-10-12 ENCOUNTER — OFFICE VISIT (OUTPATIENT)
Dept: OPTOMETRY | Facility: CLINIC | Age: 53
End: 2018-10-12
Payer: COMMERCIAL

## 2018-10-12 ENCOUNTER — HOSPITAL ENCOUNTER (OUTPATIENT)
Dept: RADIOLOGY | Facility: HOSPITAL | Age: 53
Discharge: HOME | End: 2018-10-12
Attending: INTERNAL MEDICINE
Payer: COMMERCIAL

## 2018-10-12 DIAGNOSIS — Z12.31 ENCOUNTER FOR SCREENING MAMMOGRAM FOR BREAST CANCER: ICD-10-CM

## 2018-10-12 DIAGNOSIS — H35.81 COTTON WOOL SPOTS: ICD-10-CM

## 2018-10-12 DIAGNOSIS — H35.033 HYPERTENSIVE RETINOPATHY OF BOTH EYES: Primary | ICD-10-CM

## 2018-10-12 PROCEDURE — 77063 BREAST TOMOSYNTHESIS BI: CPT | Mod: 26,,, | Performed by: RADIOLOGY

## 2018-10-12 PROCEDURE — 92014 COMPRE OPH EXAM EST PT 1/>: CPT | Mod: S$GLB,,, | Performed by: OPTOMETRIST

## 2018-10-12 PROCEDURE — 99999 PR PBB SHADOW E&M-EST. PATIENT-LVL II: CPT | Mod: PBBFAC,,, | Performed by: OPTOMETRIST

## 2018-10-12 PROCEDURE — 77067 SCR MAMMO BI INCL CAD: CPT | Mod: 26,,, | Performed by: RADIOLOGY

## 2018-10-12 PROCEDURE — 77063 BREAST TOMOSYNTHESIS BI: CPT | Mod: TC,PO

## 2018-10-12 NOTE — PROGRESS NOTES
Subjective:       Patient ID: Eva Bloom is a 53 y.o. female      Chief Complaint   Patient presents with    Follow-up     History of Present Illness  Dls: 7/11/18 Dr. Larsen    52 y/o female presents today for f/u CWS.   Pt states no changes since last visit.     Eye meds  None       Assessment/Plan:     1. Hypertensive retinopathy of both eyes  2. Cotton wool spots  CWS and flame heme OD resolved. RTC annually, sooner PRN.    Follow-up in about 1 year (around 10/12/2019).

## 2018-10-19 ENCOUNTER — HOSPITAL ENCOUNTER (OUTPATIENT)
Dept: VASCULAR SURGERY | Facility: CLINIC | Age: 53
Discharge: HOME | End: 2018-10-19
Attending: SURGERY
Payer: COMMERCIAL

## 2018-10-19 ENCOUNTER — OFFICE VISIT (OUTPATIENT)
Dept: VASCULAR SURGERY | Facility: CLINIC | Age: 53
End: 2018-10-19
Payer: COMMERCIAL

## 2018-10-19 VITALS
DIASTOLIC BLOOD PRESSURE: 67 MMHG | BODY MASS INDEX: 20.2 KG/M2 | HEART RATE: 64 BPM | SYSTOLIC BLOOD PRESSURE: 120 MMHG | HEIGHT: 66 IN | WEIGHT: 125.69 LBS

## 2018-10-19 DIAGNOSIS — N18.6 END STAGE RENAL FAILURE ON DIALYSIS: ICD-10-CM

## 2018-10-19 DIAGNOSIS — Z99.2 END STAGE RENAL FAILURE ON DIALYSIS: ICD-10-CM

## 2018-10-19 DIAGNOSIS — Z99.2 ESRD (END STAGE RENAL DISEASE) ON DIALYSIS: Primary | ICD-10-CM

## 2018-10-19 DIAGNOSIS — N18.6 END STAGE RENAL FAILURE ON DIALYSIS: Primary | ICD-10-CM

## 2018-10-19 DIAGNOSIS — N18.6 ESRD (END STAGE RENAL DISEASE) ON DIALYSIS: Primary | ICD-10-CM

## 2018-10-19 DIAGNOSIS — Z99.2 END STAGE RENAL FAILURE ON DIALYSIS: Primary | ICD-10-CM

## 2018-10-19 PROCEDURE — 99999 PR PBB SHADOW E&M-EST. PATIENT-LVL III: CPT | Mod: PBBFAC,,, | Performed by: SURGERY

## 2018-10-19 PROCEDURE — 93990 DOPPLER FLOW TESTING: CPT | Mod: S$GLB,,, | Performed by: SURGERY

## 2018-10-19 PROCEDURE — 3008F BODY MASS INDEX DOCD: CPT | Mod: CPTII,S$GLB,, | Performed by: SURGERY

## 2018-10-19 PROCEDURE — 99214 OFFICE O/P EST MOD 30 MIN: CPT | Mod: S$GLB,,, | Performed by: SURGERY

## 2018-10-19 NOTE — PROGRESS NOTES
See Dr. Muñoz's prior notse, review of systems, family history and social history are   unchanged.    HISTORY OF PRESENT ILLNESS:  A 53-year-old female with end-stage renal disease,   well known to Dr. Muñoz, status post,  1.  Covered stent placement, left AV fistula outflow tract, 03/06/2017.  2.  Left upper arm 4-7 tapered AV graft on 01/12/2017.    Of note, at the time of surgery was found to have an exceptionally thin and   friable 4 mm brachial vein, which she used outflow and which she returned with   the stenosis that was treated empirically with a Covered stent because of the   risk of rupture.    This is a 6 month f/u.  No issues with the AVG.  No interventions    PHYSICAL EXAMINATION:  VITAL SIGNS:  See nursing note.  EXTREMITIES:  Left arm shows a good thrill with minimal pulsatility in the graft. stable There is no erythema, swelling, fluctuance, purulence, or drainage of any kind. There is no skin thinning or ulceration.    IMAGING:  Duplex of the graft shows it to be patent with minimal stenosis - Slightly elevated PSV at arterial anastomosis (613 from 484 previously); volume flow 2.8 L (prior3.1 L/min from 2.9 previously.     ASSESSMENT:  Well-functioning left upper arm AV graft with Viabahn stent graft outflow treatment      PLAN: Follow up 6 months with AVF duplex if clinically indicated     MANPREET Muñoz III, MD, FACS  Professor and Chief, Vascular and Endovascular Surgery

## 2018-11-09 ENCOUNTER — OFFICE VISIT (OUTPATIENT)
Dept: HEMATOLOGY/ONCOLOGY | Facility: CLINIC | Age: 53
End: 2018-11-09
Payer: COMMERCIAL

## 2018-11-09 ENCOUNTER — LAB VISIT (OUTPATIENT)
Dept: LAB | Facility: HOSPITAL | Age: 53
End: 2018-11-09
Attending: INTERNAL MEDICINE
Payer: COMMERCIAL

## 2018-11-09 VITALS
OXYGEN SATURATION: 95 % | HEART RATE: 75 BPM | DIASTOLIC BLOOD PRESSURE: 74 MMHG | WEIGHT: 125.88 LBS | TEMPERATURE: 98 F | BODY MASS INDEX: 21.49 KG/M2 | RESPIRATION RATE: 16 BRPM | SYSTOLIC BLOOD PRESSURE: 154 MMHG | HEIGHT: 64 IN

## 2018-11-09 DIAGNOSIS — C79.89 METASTATIC RENAL CELL CARCINOMA TO INTRA-ABDOMINAL SITE: Primary | ICD-10-CM

## 2018-11-09 DIAGNOSIS — Z99.2 END STAGE RENAL FAILURE ON DIALYSIS: ICD-10-CM

## 2018-11-09 DIAGNOSIS — N18.6 END STAGE RENAL FAILURE ON DIALYSIS: ICD-10-CM

## 2018-11-09 DIAGNOSIS — D63.1 ANEMIA DUE TO END STAGE RENAL DISEASE: ICD-10-CM

## 2018-11-09 DIAGNOSIS — Z90.5 S/P NEPHRECTOMY: Chronic | ICD-10-CM

## 2018-11-09 DIAGNOSIS — N18.6 ANEMIA DUE TO END STAGE RENAL DISEASE: ICD-10-CM

## 2018-11-09 DIAGNOSIS — C64.2 MALIGNANT NEOPLASM OF LEFT KIDNEY: ICD-10-CM

## 2018-11-09 DIAGNOSIS — C64.9 METASTATIC RENAL CELL CARCINOMA TO INTRA-ABDOMINAL SITE: Primary | ICD-10-CM

## 2018-11-09 DIAGNOSIS — I51.89 COMBINED SYSTOLIC AND DIASTOLIC CARDIAC DYSFUNCTION: ICD-10-CM

## 2018-11-09 DIAGNOSIS — I12.9 HYPERTENSION, RENAL DISEASE: ICD-10-CM

## 2018-11-09 LAB
ALBUMIN SERPL BCP-MCNC: 2.8 G/DL
ALP SERPL-CCNC: 122 U/L
ALT SERPL W/O P-5'-P-CCNC: 26 U/L
ANION GAP SERPL CALC-SCNC: 11 MMOL/L
AST SERPL-CCNC: 41 U/L
BASOPHILS # BLD AUTO: 0.08 K/UL
BASOPHILS NFR BLD: 1.6 %
BILIRUB SERPL-MCNC: 0.9 MG/DL
BUN SERPL-MCNC: 57 MG/DL
CALCIUM SERPL-MCNC: 9.5 MG/DL
CHLORIDE SERPL-SCNC: 104 MMOL/L
CO2 SERPL-SCNC: 25 MMOL/L
CREAT SERPL-MCNC: 9 MG/DL
DIFFERENTIAL METHOD: ABNORMAL
EOSINOPHIL # BLD AUTO: 0.9 K/UL
EOSINOPHIL NFR BLD: 18.3 %
ERYTHROCYTE [DISTWIDTH] IN BLOOD BY AUTOMATED COUNT: 15.4 %
EST. GFR  (AFRICAN AMERICAN): 5.2 ML/MIN/1.73 M^2
EST. GFR  (NON AFRICAN AMERICAN): 4.5 ML/MIN/1.73 M^2
GLUCOSE SERPL-MCNC: 121 MG/DL
HCT VFR BLD AUTO: 34 %
HGB BLD-MCNC: 10.7 G/DL
IMM GRANULOCYTES # BLD AUTO: 0.01 K/UL
IMM GRANULOCYTES NFR BLD AUTO: 0.2 %
LYMPHOCYTES # BLD AUTO: 1.6 K/UL
LYMPHOCYTES NFR BLD: 32.5 %
MCH RBC QN AUTO: 35.2 PG
MCHC RBC AUTO-ENTMCNC: 31.5 G/DL
MCV RBC AUTO: 112 FL
MONOCYTES # BLD AUTO: 0.6 K/UL
MONOCYTES NFR BLD: 12.1 %
NEUTROPHILS # BLD AUTO: 1.8 K/UL
NEUTROPHILS NFR BLD: 35.3 %
NRBC BLD-RTO: 0 /100 WBC
PLATELET # BLD AUTO: 159 K/UL
PMV BLD AUTO: 10.3 FL
POTASSIUM SERPL-SCNC: 4.4 MMOL/L
PROT SERPL-MCNC: 6.9 G/DL
RBC # BLD AUTO: 3.04 M/UL
SODIUM SERPL-SCNC: 140 MMOL/L
T4 FREE SERPL-MCNC: 0.78 NG/DL
TSH SERPL DL<=0.005 MIU/L-ACNC: 6.69 UIU/ML
WBC # BLD AUTO: 5.04 K/UL

## 2018-11-09 PROCEDURE — 36415 COLL VENOUS BLD VENIPUNCTURE: CPT

## 2018-11-09 PROCEDURE — 84439 ASSAY OF FREE THYROXINE: CPT

## 2018-11-09 PROCEDURE — 99999 PR PBB SHADOW E&M-EST. PATIENT-LVL IV: CPT | Mod: PBBFAC,,, | Performed by: NURSE PRACTITIONER

## 2018-11-09 PROCEDURE — 85025 COMPLETE CBC W/AUTO DIFF WBC: CPT

## 2018-11-09 PROCEDURE — 80053 COMPREHEN METABOLIC PANEL: CPT

## 2018-11-09 PROCEDURE — 84443 ASSAY THYROID STIM HORMONE: CPT

## 2018-11-09 PROCEDURE — 3008F BODY MASS INDEX DOCD: CPT | Mod: CPTII,S$GLB,, | Performed by: NURSE PRACTITIONER

## 2018-11-09 PROCEDURE — 99214 OFFICE O/P EST MOD 30 MIN: CPT | Mod: S$GLB,,, | Performed by: NURSE PRACTITIONER

## 2018-11-09 NOTE — Clinical Note
RTC in 6 weeks with labs (CBC,CMP), CT chest and MRI abdomen without contrast and to see Dr. Rivera.

## 2018-11-09 NOTE — PROGRESS NOTES
"Subjective:       Patient ID: Eva Bloom is a 53 y.o. female.    Chief Complaint: RCC    HPI     53 year old female, patient of Dr. Rivera, to clinic for 6 week follow up and to review labs for recurrent metastatic renal cell carcinoma. Pt is currently taking cabozantinib daily. Pt has ESRD and is on  HD every TTS.   Patient tolerating treat well and has no complaints today. She denies any issues with SE.  She denies any mouth sores, nausea, vomiting, diarrhea, constipation, abdominal pain, weight loss, chest pain, leg swelling, headache, dizziness, or mood changes.    ECOG 1. She presents alone.    Oncologic History (From Chart and Patient):  Eva Bloom is a 52 y.o. female with ESRD on HD who was found to have two L renal masses. She underwent L lap nephrectomy on 1/12/16. 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 Margins were negative.  Shehealed from surgery well, but then developed a large ovarian mass.  This was removed by gyn along with multiple sites of metastatic disease in the pelvis.  Path was c/w recurrence of RCC.     11/20/16 Pelvic ultrasound reveals "Large heterogeneous cystic and solid mass which appears to arise from the right ovary with worrisome imaging features for malignancy.  Differential diagnosis includes malignant tumors such as serous or mucinous cystadenocarcinoma.  It is important to note that the patient is at risk for ovarian torsion due to the size of the mass.Multiple uterine fibroids are identified with the largest in the uterine fundus."    11/22/16 pathology reveals "FINAL PATHOLOGIC DIAGNOSIS-RIGHT OVARY AND FALLOPIAN TUBE, RIGHT SALPINGO-OOPHORECTOMY:  -Positive for malignancy, high grade carcinoma morphologically and immunohistochemically consistent with metastasis from the patient's known renal cell carcinoma"    1/23/17 MRI abdomen reveals " ""Status post left nephrectomy, the left nephrectomy bed appears normal.There is a small liver lesion and a small splenic lesion, they cannot be fully characterized but they are unchanged from 5/27/16 suggestive of benign lesions.Multiple right kidney cysts.Cholelithiasis.Soft tissue oval density noted within the mesentery adjacent to the aorta right iliac bifurcation, nonspecific, could represent an enlarged lymph node, it is unchanged from prior studies"    1/23/17- CT Chest reveals "Stable pulmonary nodules, likely benign. No new pulmonary parenchymal abnormalities are identified. Enlarging nodular foci in partially visualized left upper abdomen. Peritoneal implants not excluded. Multiple thyroid nodules, measuring up to 1.6 cm."    4/5/17-MRI abdomen reveals "Again seen are the T2 hyperintense hepatic and splenic lesions that appear similar to prior exam and possibly represents hemangiomas. There are right renal cysts and the patient is status post left nephrectomy."    7/3/17- MRI abdomen "Left nephrectomy.  Right renal cysts with no malignant features.Liver and splenic lesions consistent with cysts versus hemangiomas.Cholelithiasis.Right pleural effusion and right basal atelectasis.Small pericardial effusion.Body wall edema and mesenteric edema."  7/3/17- CT chest "In this patient with a history of left renal cell carcinoma, there are 2 right lung nodules which are stable compared to January 2017.  2 left lung nodules seen on that study are not well-seen today, may be related to a small amount of pleural fluid.Bilateral pleural effusions, mildly decreased compared to prior study.Small amount of pericardial fluid, new."    Review of Systems   Constitutional: Negative for activity change, appetite change, chills, fatigue and fever.   HENT: Negative for congestion, hearing loss, mouth sores, sore throat, tinnitus and voice change.    Eyes: Negative for pain and visual disturbance.   Respiratory: Negative for " cough, shortness of breath and wheezing.    Cardiovascular: Negative for chest pain, palpitations and leg swelling.   Gastrointestinal: Negative for abdominal pain, constipation, diarrhea, nausea and vomiting.   Endocrine: Negative for cold intolerance and heat intolerance.   Genitourinary: Negative for difficulty urinating, dyspareunia, dysuria, frequency, menstrual problem, urgency, vaginal bleeding, vaginal discharge and vaginal pain.   Musculoskeletal: Negative for arthralgias and myalgias.   Skin: Negative for color change, rash and wound.   Allergic/Immunologic: Negative for environmental allergies and food allergies.   Neurological: Negative for weakness, numbness and headaches.   Hematological: Negative for adenopathy. Does not bruise/bleed easily.   Psychiatric/Behavioral: Negative for agitation, confusion, hallucinations and sleep disturbance. The patient is not nervous/anxious.    All other systems reviewed and are negative.      Allergies:  Review of patient's allergies indicates:   Allergen Reactions    Allopurinol      Other reaction(s): abnormal transaminases       Medications:  Current Outpatient Medications   Medication Sig Dispense Refill    acetaminophen (TYLENOL) 500 MG tablet Take 500 mg by mouth every 6 (six) hours as needed for Pain.      aluminum-magnesium hydroxide-simethicone (MAALOX) 200-200-20 mg/5 mL Susp Take 30 mLs by mouth every 6 (six) hours as needed.       amLODIPine (NORVASC) 5 MG tablet Take 1 tablet (5 mg total) by mouth once daily. 90 tablet 3    cabozantinib 60 mg Tab Take 60 mg by mouth once daily. 30 tablet 11    cloNIDine 0.2 mg/24 hr td ptwk (CATAPRES) 0.2 mg/24 hr Place 1 patch onto the skin every 7 days. 12 patch 3    febuxostat (ULORIC) 40 mg Tab Take 1 tablet (40 mg total) by mouth once daily. 30 tablet 4    hydrALAZINE (APRESOLINE) 100 MG tablet Take 1 tablet (100 mg total) by mouth every 12 (twelve) hours. 180 tablet 3    isosorbide mononitrate (IMDUR) 30 MG  24 hr tablet Take 1 tablet (30 mg total) by mouth once daily. 90 tablet 3    losartan (COZAAR) 100 MG tablet Take 1 tablet (100 mg total) by mouth once daily. 90 tablet 3    methylPREDNISolone (MEDROL DOSEPACK) 4 mg tablet use as directed 1 Package 0    metoprolol succinate (TOPROL-XL) 50 MG 24 hr tablet Take 1 tablet (50 mg total) by mouth once daily. 90 tablet 3    multivitamin-Ca-iron-minerals (ONE-A-DAY WOMENS FORMULA) 27-0.4 mg Tab Take by mouth. 1 Tablet Oral Every day      ondansetron (ZOFRAN-ODT) 8 MG TbDL Take 1 tablet (8 mg total) by mouth every 8 (eight) hours as needed. 30 tablet 1    psyllium husk-calcium (METAMUCIL PLUS CALCIUM) 1-60 gram-mg Cap Take by mouth. 2 Capsule Oral Every morning      sevelamer HCl (RENAGEL) 800 MG Tab Take 2 tablets (1,600 mg total) by mouth 3 (three) times daily with meals. 600 tablet 3     No current facility-administered medications for this visit.        PMH:  Past Medical History:   Diagnosis Date    Anemia     Bronchitis 03/01/2017    Cancer 2016    kidney cancer    CMV (cytomegalovirus) antibody positive     Essential hypertension 9/23/2015    H/O herpes simplex type 2 infection     Herpes simplex type 1 antibody positive     History of kidney cancer     s/p left nephrectomy 1/2016    Hyperparathyroidism, unspecified     Hyperuricemia without signs of inflammatory arthritis and tophaceous disease     Kidney stones     LGSIL (low grade squamous intraepithelial dysplasia)     Prediabetes     Proteinuria     Renal disorder     Thyroid nodule     Urate nephropathy        PSH:  Past Surgical History:   Procedure Laterality Date    BREAST CYST EXCISION      COLONOSCOPY N/A 11/12/2015    COLONOSCOPY N/A 11/12/2015    Performed by [facility]inner [facility] ENDO (4TH FLR)    INSERTION-CATHETER-HEMODIALYSIS Right 1/12/2016    Performed by Joshua Goldberg, MD at [facility] OR 2ND FLR    [id number]-[id number] LEFT UPPER EXTREMITY Left 1/12/2017     Performed by FARIDEH Muñoz III, MD at [facility] OR 2ND FLR    INSERTION-PERITONEAL DIALYSIS CATHETER-LAPAROSCOPIC N/A 2/26/2016    Performed by Vaughn Baron MD at [facility] OR 2ND FLR    NEPHRECTOMY-LAPAROSCOPIC Left 01/12/2016    NEPHRECTOMY-LAPAROSCOPIC Left 1/12/2016    Performed by Otis Anderson MD at [facility] OR 2ND FLR    PERITONEAL CATHETER INSERTION      Permacath insertion  01/12/2017    PERMCATH REMOVAL-TUNNELED CVC REMOVAL N/A 4/1/2016    Performed by Figeuroa Dsouza MD at [facility] CATH LAB    REMOVAL-CATHETER-PERITONEAL DIALYSIS N/A 1/12/2017    Performed by Vaughn Baron MD at [facility] OR 2ND FLR    ROBOT ASSISTED LAPAROSCOPIC SALPINGO-OOPHERECTOMY Right 11/22/2016    [facility] Miguel [facility]orenson Jr[facility] OR    SALPINGOOPHORECTOMY Right 2016    KJB---DAVINCI    TONSILLECTOMY      TUBAL LIGATION         FamHx:  Family History   Problem Relation Age of Onset    Hypertension Mother     Diabetes Father     Kidney disease Father     No Known Problems Son     No Known Problems Son     Diabetes Maternal Grandfather     No Known Problems Sister     No Known Problems Brother     No Known Problems Maternal Grandmother     No Known Problems Paternal Grandmother     No Known Problems Paternal Grandfather     Heart disease Sister     No Known Problems Sister     No Known Problems Brother     No Known Problems Maternal Aunt     No Known Problems Maternal Uncle     No Known Problems Paternal Aunt     No Known Problems Paternal Uncle     Breast cancer Neg Hx     Colon cancer Neg Hx     Cancer Neg Hx     Stroke Neg Hx     Amblyopia Neg Hx     Blindness Neg Hx     Cataracts Neg Hx     Glaucoma Neg Hx     Macular degeneration Neg Hx     Retinal detachment Neg Hx     Strabismus Neg Hx     Thyroid disease Neg Hx        SocHx:  Social History     Socioeconomic History    Marital status:      Spouse name: Not on file    Number of children: 2    Years of  education: Not on file    Highest education level: Not on file   Social Needs    Financial resource strain: Not on file    Food insecurity - worry: Not on file    Food insecurity - inability: Not on file    Transportation needs - medical: Not on file    Transportation needs - non-medical: Not on file   Occupational History    Occupation: lucero     Employer: [id number]   Tobacco Use    Smoking status: Never Smoker    Smokeless tobacco: Never Used   Substance and Sexual Activity    Alcohol use: No     Comment: . 2 children. works at [employer].    Drug use: No    Sexual activity: Yes     Partners: Male   Other Topics Concern    Not on file   Social History Narrative    Not on file         Objective:      Physical Exam   Constitutional: She is oriented to person, place, and time. She appears well-developed and well-nourished. No distress.   HENT:   Head: Normocephalic and atraumatic.   Nose: Nose normal.   Mouth/Throat: Oropharynx is clear and moist. No oropharyngeal exudate.   Eyes: Conjunctivae and EOM are normal. Pupils are equal, round, and reactive to light. Right eye exhibits no discharge. Left eye exhibits no discharge.   Neck: Normal range of motion. Neck supple. No tracheal deviation present.   Cardiovascular: Normal rate and regular rhythm. Exam reveals no gallop and no friction rub.   Murmur heard.  Pulmonary/Chest: Effort normal and breath sounds normal. No respiratory distress. She has no wheezes. She has no rales.   Abdominal: Soft. Bowel sounds are normal. She exhibits no distension. There is no tenderness. There is no rebound and no guarding.   Genitourinary: No breast tenderness, discharge or bleeding.   Musculoskeletal: Normal range of motion. She exhibits no edema or tenderness.   Neurological: She is alert and oriented to person, place, and time. She has normal reflexes. No cranial nerve deficit. Coordination normal.   Skin: Skin is warm and dry. No rash noted. She is not  diaphoretic. No erythema. No pallor.   Psychiatric: She has a normal mood and affect. Her behavior is normal. Thought content normal.   Nursing note and vitals reviewed.      LABS:  WBC   Date Value Ref Range Status   11/09/2018 5.04 3.90 - 12.70 K/uL Final     Hemoglobin   Date Value Ref Range Status   11/09/2018 10.7 (L) 12.0 - 16.0 g/dL Final     Hematocrit   Date Value Ref Range Status   11/09/2018 34.0 (L) 37.0 - 48.5 % Final     Platelets   Date Value Ref Range Status   11/09/2018 159 150 - 350 K/uL Final       Chemistry        Component Value Date/Time     11/09/2018 0719    K 4.4 11/09/2018 0719     11/09/2018 0719    CO2 25 11/09/2018 0719    BUN 57 (H) 11/09/2018 0719    CREATININE 9.0 (H) 11/09/2018 0719     (H) 11/09/2018 0719        Component Value Date/Time    CALCIUM 9.5 11/09/2018 0719    ALKPHOS 122 11/09/2018 0719    AST 41 (H) 11/09/2018 0719    ALT 26 11/09/2018 0719    BILITOT 0.9 11/09/2018 0719            Assessment:       1. Metastatic renal cell carcinoma to intra-abdominal site    2. S/p nephrectomy left    3. End stage renal failure on dialysis    4. Anemia due to end stage renal disease    5. Combined systolic and diastolic cardiac dysfunction    6. Hypertension, renal disease        Plan:       1,2- Metastatic Renal Cell Carcinoma:    - It is unlikely that surgery removed all sites of metastatic disease. Given that this is c/w her previous papillary disease, pt started on oral cabozantinib 60mg daily (for dual VEGF and MET inhibition), especially given recent data that shows cabo is far superior to student in the first line setting.  If pt progresses on this regimen, will switch to nivolumab.     Most recent Restaging scans show stable disease and pt tolerating cabozantinib relatively well. We will continue cabozantinib for now.    3- HD TTS    4- Anemia parameters with slight dip- will monitor. Asymptomatic.     5- No evidence of cardiac decompensation.      6-Controlled.     Following with cardiology and PCP to manage 5,6.      TSH pending.     RTC in 6 weeks with labs (CBC,CMP), CT chest and MRI abdomen without contrast and to see Dr. Rivera.        Patient is in agreement with the proposed treatment plan. All questions were answered to the patient's satisfaction. Pt knows to call clinic for any new or worsening symptoms and if anything is needed before the next clinic visit.      TONA Rudd-RODRÍGUEZ  Hematology & Oncology  [address] [place] [id number]  ph. [phone]  Fax. [phone]     I spent 30 minutes (face to face) with the patient, more than 50% was in counseling and coordination of care as detailed above.         Distress Screening Results: Psychosocial Distress screening score of Distress Score: 0 noted and reviewed. No intervention indicated.

## 2018-11-16 ENCOUNTER — OFFICE VISIT (OUTPATIENT)
Dept: FAMILY MEDICINE | Facility: CLINIC | Age: 53
End: 2018-11-16
Payer: COMMERCIAL

## 2018-11-16 VITALS
OXYGEN SATURATION: 96 % | BODY MASS INDEX: 21.38 KG/M2 | SYSTOLIC BLOOD PRESSURE: 120 MMHG | HEIGHT: 64 IN | TEMPERATURE: 98 F | HEART RATE: 74 BPM | DIASTOLIC BLOOD PRESSURE: 70 MMHG | WEIGHT: 125.25 LBS

## 2018-11-16 DIAGNOSIS — N18.6 END STAGE RENAL FAILURE ON DIALYSIS: ICD-10-CM

## 2018-11-16 DIAGNOSIS — C79.89 METASTATIC RENAL CELL CARCINOMA TO INTRA-ABDOMINAL SITE: ICD-10-CM

## 2018-11-16 DIAGNOSIS — I51.89 COMBINED SYSTOLIC AND DIASTOLIC CARDIAC DYSFUNCTION: ICD-10-CM

## 2018-11-16 DIAGNOSIS — I27.20 PULMONARY HYPERTENSION: ICD-10-CM

## 2018-11-16 DIAGNOSIS — N25.81 HYPERPARATHYROIDISM, SECONDARY RENAL: ICD-10-CM

## 2018-11-16 DIAGNOSIS — R73.03 PREDIABETES: ICD-10-CM

## 2018-11-16 DIAGNOSIS — Z12.4 SCREENING FOR CERVICAL CANCER: ICD-10-CM

## 2018-11-16 DIAGNOSIS — Z99.2 END STAGE RENAL FAILURE ON DIALYSIS: ICD-10-CM

## 2018-11-16 DIAGNOSIS — Z00.00 ROUTINE MEDICAL EXAM: Primary | ICD-10-CM

## 2018-11-16 DIAGNOSIS — Z71.89 ADVANCED DIRECTIVES, COUNSELING/DISCUSSION: ICD-10-CM

## 2018-11-16 DIAGNOSIS — C64.9 METASTATIC RENAL CELL CARCINOMA TO INTRA-ABDOMINAL SITE: ICD-10-CM

## 2018-11-16 DIAGNOSIS — I10 ESSENTIAL HYPERTENSION: ICD-10-CM

## 2018-11-16 DIAGNOSIS — Z01.419 ENCOUNTER FOR GYNECOLOGICAL EXAMINATION WITHOUT ABNORMAL FINDING: ICD-10-CM

## 2018-11-16 PROCEDURE — 87624 HPV HI-RISK TYP POOLED RSLT: CPT

## 2018-11-16 PROCEDURE — 88175 CYTOPATH C/V AUTO FLUID REDO: CPT

## 2018-11-16 PROCEDURE — 99999 PR PBB SHADOW E&M-EST. PATIENT-LVL IV: CPT | Mod: PBBFAC,,, | Performed by: INTERNAL MEDICINE

## 2018-11-16 PROCEDURE — 99396 PREV VISIT EST AGE 40-64: CPT | Mod: S$GLB,,, | Performed by: INTERNAL MEDICINE

## 2018-11-16 NOTE — PROGRESS NOTES
HISTORY OF PRESENT ILLNESS:  Eva Bloom is a 53 y.o. female who presents to the clinic today for a routine physical exam. Her last physical exam was approximately 1 years(s) ago.    Contraception: bilateral tubal ligation      PAST MEDICAL HISTORY:  Past Medical History:   Diagnosis Date    Anemia     Bronchitis 03/01/2017    Cancer 2016    kidney cancer    CMV (cytomegalovirus) antibody positive     Essential hypertension 9/23/2015    H/O herpes simplex type 2 infection     Herpes simplex type 1 antibody positive     History of kidney cancer     s/p left nephrectomy 1/2016    Hyperparathyroidism, unspecified     Hyperuricemia without signs of inflammatory arthritis and tophaceous disease     Kidney stones     LGSIL (low grade squamous intraepithelial dysplasia)     Prediabetes     Proteinuria     Renal disorder     Thyroid nodule     Urate nephropathy        PAST SURGICAL HISTORY:  Past Surgical History:   Procedure Laterality Date    BREAST CYST EXCISION      COLONOSCOPY N/A 11/12/2015    COLONOSCOPY N/A 11/12/2015    Performed by Dandre Skinner MD at [facility] ENDO (4TH FLR)    INSERTION-CATHETER-HEMODIALYSIS Right 1/12/2016    Performed by Joshua Goldberg, MD at [facility] OR 2ND FLR    GRBAIISLD-MSYBM-AMCLQFBWHDSQL LEFT UPPER EXTREMITY Left 1/12/2017    Performed by FARIDEH Muñoz III, MD at [facility] OR 2ND FLR    INSERTION-PERITONEAL DIALYSIS CATHETER-LAPAROSCOPIC N/A 2/26/2016    Performed by Vaughn Baron MD at [facility] OR 2ND FLR    NEPHRECTOMY-LAPAROSCOPIC Left 01/12/2016    NEPHRECTOMY-LAPAROSCOPIC Left 1/12/2016    Performed by Otis Anderson MD at [facility] OR 2ND FLR    PERITONEAL CATHETER INSERTION      Permacath insertion  01/12/2017    PERMCATH REMOVAL-TUNNELED CVC REMOVAL N/A 4/1/2016    Performed by Figueroa Dsouza MD at [facility] CATH LAB    REMOVAL-CATHETER-PERITONEAL DIALYSIS N/A 1/12/2017    Performed by Vaughn Baron MD at [facility] OR 2ND FLR    ROBOT ASSISTED  LAPAROSCOPIC SALPINGO-OOPHERECTOMY Right 11/22/2016    [facility]son Jr[facility] at [facility] OR    SALPINGOOPHORECTOMY Right 2016    [facility]---DA[facility]IN[facility]    TONSILLECTOMY      TUBAL LIGATION         SOCIAL HISTORY:  Social History     Socioeconomic History    Marital status:      Spouse name: Not on file    Number of children: 2    Years of education: Not on file    Highest education level: Not on file   Social Needs    Financial resource strain: Not on file    Food insecurity - worry: Not on file    Food insecurity - inability: Not on file    Transportation needs - medical: Not on file    Transportation needs - non-medical: Not on file   Occupational History    Occupation: COADE     Employer: [id number]   Tobacco Use    Smoking status: Never Smoker    Smokeless tobacco: Never Used   Substance and Sexual Activity    Alcohol use: No     Comment: . 2 children. works at [employer].    Drug use: No    Sexual activity: Yes     Partners: Male   Other Topics Concern    Not on file   Social History Narrative    Not on file       FAMILY HISTORY:  Family History   Problem Relation Age of Onset    Hypertension Mother     Diabetes Father     Kidney disease Father     No Known Problems Son     No Known Problems Son     Diabetes Maternal Grandfather     No Known Problems Sister     No Known Problems Brother     No Known Problems Maternal Grandmother     No Known Problems Paternal Grandmother     No Known Problems Paternal Grandfather     Heart disease Sister     No Known Problems Sister     No Known Problems Brother     No Known Problems Maternal Aunt     No Known Problems Maternal Uncle     No Known Problems Paternal Aunt     No Known Problems Paternal Uncle     Breast cancer Neg Hx     Colon cancer Neg Hx     Cancer Neg Hx     Stroke Neg Hx     Amblyopia Neg Hx     Blindness Neg Hx     Cataracts Neg Hx     Glaucoma Neg Hx     Macular degeneration Neg Hx      Retinal detachment Neg Hx     Strabismus Neg Hx     Thyroid disease Neg Hx        ALLERGIES AND MEDICATIONS: updated and reviewed.  Review of patient's allergies indicates:   Allergen Reactions    Coreg [carvedilol] Other (See Comments)     Nausea/vomiting    Allopurinol      Other reaction(s): abnormal transaminases        Medication List           Accurate as of 11/16/18  8:52 AM. If you have any questions, ask your nurse or doctor.               CONTINUE taking these medications    acetaminophen 500 MG tablet  Commonly known as:  TYLENOL     aluminum-magnesium hydroxide-simethicone 200-200-20 mg/5 mL Susp  Commonly known as:  MAALOX     amLODIPine 5 MG tablet  Commonly known as:  NORVASC  Take 1 tablet (5 mg total) by mouth once daily.     cabozantinib 60 mg Tab  Commonly known as:  CABOMETYX  Take 60 mg by mouth once daily.     cloNIDine 0.2 mg/24 hr td ptwk 0.2 mg/24 hr  Commonly known as:  CATAPRES  Place 1 patch onto the skin every 7 days.     febuxostat 40 mg Tab  Commonly known as:  ULORIC  Take 1 tablet (40 mg total) by mouth once daily.     hydrALAZINE 100 MG tablet  Commonly known as:  APRESOLINE  Take 1 tablet (100 mg total) by mouth every 12 (twelve) hours.     isosorbide mononitrate 30 MG 24 hr tablet  Commonly known as:  IMDUR  Take 1 tablet (30 mg total) by mouth once daily.     losartan 100 MG tablet  Commonly known as:  COZAAR  Take 1 tablet (100 mg total) by mouth once daily.     METAMUCIL PLUS CALCIUM 1-60 gram-mg Cap  Generic drug:  psyllium husk-calcium     methylPREDNISolone 4 mg tablet  Commonly known as:  MEDROL DOSEPACK  use as directed     metoprolol succinate 50 MG 24 hr tablet  Commonly known as:  TOPROL-XL  Take 1 tablet (50 mg total) by mouth once daily.     ondansetron 8 MG Tbdl  Commonly known as:  ZOFRAN-ODT  Take 1 tablet (8 mg total) by mouth every 8 (eight) hours as needed.     ONE-A-DAY WOMENS FORMULA 27-0.4 mg Tab  Generic drug:  multivitamin-Ca-iron-minerals     sevelamer  "HCl 800 MG Tab  Commonly known as:  RENAGEL  Take 2 tablets (1,600 mg total) by mouth 3 (three) times daily with meals.              CARE TEAM:  Patient Care Team:  Sowmya Mccain MD as PCP - General (Internal Medicine)  Ben Rivera MD as Consulting Physician (Hematology and Oncology)  Williams Hart MD as Consulting Physician (Nephrology)           SCREENING HISTORY:  Health Maintenance       Date Due Completion Date    Hemoglobin A1c 09/29/2018 9/29/2017    Pap Smear 11/13/2018 11/13/2017    Mammogram 10/12/2019 10/12/2018    Lipid Panel 09/08/2021 9/8/2016    Pneumococcal PPSV23 (High Risk) (2) 11/09/2021 11/9/2016    TETANUS VACCINE 09/23/2025 9/23/2015    Colonoscopy 11/12/2025 11/12/2015            REVIEW OF SYSTEMS:   The patient reports good dietary habits.  The patient does not exercise regularly.  Review of Systems   Constitutional: Positive for fatigue (- mild; chronic). Negative for chills, fever and unexpected weight change.   HENT: Negative for congestion and postnasal drip.    Eyes: Negative for pain and visual disturbance.   Respiratory: Negative for cough, shortness of breath and wheezing.    Cardiovascular: Negative for chest pain, palpitations and leg swelling.   Gastrointestinal: Negative for abdominal pain, constipation, diarrhea, nausea and vomiting.   Genitourinary: Negative for dysuria.   Musculoskeletal: Negative for arthralgias and back pain.   Skin: Negative for rash.   Neurological: Negative for weakness and headaches.   Psychiatric/Behavioral: Negative for dysphoric mood and sleep disturbance. The patient is not nervous/anxious.      negative for - pelvic pain  Breast ROS: negative for breast lumps        Physical Examination:   Vitals:    11/16/18 0818   BP: 120/70   Pulse: 74   Temp: 98.1 °F (36.7 °C)     Weight: 56.8 kg (125 lb 3.5 oz)   Height: 5' 4" (162.6 cm)   Body mass index is 21.49 kg/m².      Patient declined to have a chaperone present during the exam " today.  General appearance - alert, well appearing, and in no distress and normal appearing weight  Mental status - alert, oriented to person, place, and time, normal mood, behavior, speech, dress, motor activity, and thought processes  Eyes - pupils equal and reactive, extraocular eye movements intact, sclera anicteric  Mouth - mucous membranes moist, pharynx normal without lesions  Neck - supple, no significant adenopathy, carotids upstroke normal bilaterally, no bruits, thyroid exam: thyroid is normal in size without nodules or tenderness  Lymphatics - no palpable lymphadenopathy  Chest - clear to auscultation, no wheezes, rales or rhonchi, symmetric air entry  Heart - normal rate and regular rhythm, no gallops noted; 3/6 systolic murmur  Abdomen - soft, nontender, nondistended, no masses or organomegaly  Breasts - not examined  Pelvic - normal external genitalia, vulva, cervix, uterus and adnexa, VAGINA: atrophic  Back exam - full range of motion, no tenderness, palpable spasm or pain on motion  Neurological - alert, oriented, normal speech, no focal findings or movement disorder noted, cranial nerves II through XII intact  Musculoskeletal - no joint tenderness, deformity or swelling, no muscular tenderness noted  Extremities - peripheral pulses normal, no pedal edema, no clubbing or cyanosis  Skin - normal coloration and turgor, no rashes, no suspicious skin lesions noted      ASSESSMENT AND PLAN:  1. Routine medical exam  Counseled on age appropriate medical preventative services including age appropriate cancer screenings, age appropriate eye and dental exams, over all nutritional health, need for a consistent exercise regimen, and an over all push towards maintaining a vigorous and active lifestyle.  Counseled on age appropriate vaccines and discussed upcoming health care needs based on age/gender. Discussed good sleep hygiene and stress management.     2. Essential hypertension/3. Combined systolic and  diastolic cardiac dysfunction/4. Pulmonary hypertension  The current medical regimen is effective;  continue present plan and medications.     5. Prediabetes  Stable. We discussed low sugar/low carbohydrate diet and regular exercise to prevent progression. No need for prescription medication at this time.   - Hemoglobin A1c; Future    6. Metastatic renal cell carcinoma to intra-abdominal site  Stable. Continue current chemo regimen. Followed by oncology.    7. Hyperparathyroidism, secondary renal  Stable. Asymptomatic. Observe.     8. End stage renal failure on dialysis  Continue dialysis.    9. Encounter for gynecological examination without abnormal finding/10. Screening for cervical cancer    - Liquid-based pap smear, screening  - HPV High Risk Genotypes, PCR    11. Advanced directives, counseling/discussion  I had a discussion today with the patient regarding advanced directives.  Advanced directives were discussed due to patient's age and/or chronic medical conditions. Prognosis based on current condition: fair. Paperwork was given to complete living will and medical POA. [place] was not discussed. Approximately 5 minute(s) spent on counseling/discussion regarding end of life decision making.            Follow-up in about 6 months (around 5/16/2019), or if symptoms worsen or fail to improve, for follow up chronic medical conditions.. or sooner as needed.

## 2018-11-19 DIAGNOSIS — I12.9 HYPERTENSIVE KIDNEY DISEASE WITH CHRONIC KIDNEY DISEASE, STAGE 1-4 OR UNSPECIFIED CHRONIC KIDNEY DISEASE: ICD-10-CM

## 2018-11-19 RX ORDER — LOSARTAN POTASSIUM 100 MG/1
100 TABLET ORAL DAILY
Qty: 90 TABLET | Refills: 3 | Status: SHIPPED | OUTPATIENT
Start: 2018-11-19 | End: 2019-03-26

## 2018-11-21 LAB
HPV HR 12 DNA CVX QL NAA+PROBE: NEGATIVE
HPV16 AG SPEC QL: NEGATIVE
HPV18 DNA SPEC QL NAA+PROBE: NEGATIVE

## 2018-11-28 DIAGNOSIS — C64.9 RENAL CELL CARCINOMA, UNSPECIFIED LATERALITY: ICD-10-CM

## 2018-11-30 RX ORDER — LIDOCAINE AND PRILOCAINE 25; 25 MG/G; MG/G
CREAM TOPICAL
Qty: 25 G | Refills: 0 | Status: SHIPPED | OUTPATIENT
Start: 2018-11-30 | End: 2018-12-14 | Stop reason: SDUPTHER

## 2018-12-15 RX ORDER — LIDOCAINE AND PRILOCAINE 25; 25 MG/G; MG/G
CREAM TOPICAL
Qty: 30 G | Refills: 11 | Status: SHIPPED | OUTPATIENT
Start: 2018-12-15 | End: 2020-02-11 | Stop reason: SDUPTHER

## 2018-12-21 ENCOUNTER — HOSPITAL ENCOUNTER (OUTPATIENT)
Dept: RADIOLOGY | Facility: HOSPITAL | Age: 53
Discharge: HOME | End: 2018-12-21
Attending: NURSE PRACTITIONER
Payer: COMMERCIAL

## 2018-12-21 DIAGNOSIS — C79.89 METASTATIC RENAL CELL CARCINOMA TO INTRA-ABDOMINAL SITE: ICD-10-CM

## 2018-12-21 DIAGNOSIS — Z99.2 END STAGE RENAL FAILURE ON DIALYSIS: ICD-10-CM

## 2018-12-21 DIAGNOSIS — N18.6 END STAGE RENAL FAILURE ON DIALYSIS: ICD-10-CM

## 2018-12-21 DIAGNOSIS — C64.9 METASTATIC RENAL CELL CARCINOMA TO INTRA-ABDOMINAL SITE: ICD-10-CM

## 2018-12-21 PROCEDURE — 74181 MRI ABDOMEN W/O CONTRAST: CPT | Mod: TC

## 2018-12-21 PROCEDURE — 74181 MRI ABDOMEN W/O CONTRAST: CPT | Mod: 26,,, | Performed by: RADIOLOGY

## 2018-12-21 PROCEDURE — 71250 CT THORAX DX C-: CPT | Mod: 26,,, | Performed by: RADIOLOGY

## 2018-12-21 PROCEDURE — 71250 CT THORAX DX C-: CPT | Mod: TC

## 2018-12-26 ENCOUNTER — LAB VISIT (OUTPATIENT)
Dept: LAB | Facility: HOSPITAL | Age: 53
End: 2018-12-26
Payer: COMMERCIAL

## 2018-12-26 ENCOUNTER — OFFICE VISIT (OUTPATIENT)
Dept: HEMATOLOGY/ONCOLOGY | Facility: CLINIC | Age: 53
End: 2018-12-26
Payer: COMMERCIAL

## 2018-12-26 VITALS
WEIGHT: 129.63 LBS | SYSTOLIC BLOOD PRESSURE: 145 MMHG | HEART RATE: 73 BPM | DIASTOLIC BLOOD PRESSURE: 74 MMHG | TEMPERATURE: 98 F | OXYGEN SATURATION: 98 % | HEIGHT: 64 IN | BODY MASS INDEX: 22.13 KG/M2 | RESPIRATION RATE: 16 BRPM

## 2018-12-26 DIAGNOSIS — R73.03 PREDIABETES: ICD-10-CM

## 2018-12-26 DIAGNOSIS — C79.89 METASTATIC RENAL CELL CARCINOMA TO INTRA-ABDOMINAL SITE: Primary | ICD-10-CM

## 2018-12-26 DIAGNOSIS — N18.6 END STAGE RENAL FAILURE ON DIALYSIS: ICD-10-CM

## 2018-12-26 DIAGNOSIS — C64.9 METASTATIC RENAL CELL CARCINOMA TO INTRA-ABDOMINAL SITE: ICD-10-CM

## 2018-12-26 DIAGNOSIS — C64.9 METASTATIC RENAL CELL CARCINOMA TO INTRA-ABDOMINAL SITE: Primary | ICD-10-CM

## 2018-12-26 DIAGNOSIS — Z99.2 END STAGE RENAL FAILURE ON DIALYSIS: ICD-10-CM

## 2018-12-26 DIAGNOSIS — C79.89 METASTATIC RENAL CELL CARCINOMA TO INTRA-ABDOMINAL SITE: ICD-10-CM

## 2018-12-26 LAB
ALBUMIN SERPL BCP-MCNC: 2.6 G/DL
ALP SERPL-CCNC: 122 U/L
ALT SERPL W/O P-5'-P-CCNC: 24 U/L
ANION GAP SERPL CALC-SCNC: 16 MMOL/L
AST SERPL-CCNC: 34 U/L
BILIRUB SERPL-MCNC: 1 MG/DL
BUN SERPL-MCNC: 108 MG/DL
CALCIUM SERPL-MCNC: 9.2 MG/DL
CHLORIDE SERPL-SCNC: 99 MMOL/L
CO2 SERPL-SCNC: 24 MMOL/L
CREAT SERPL-MCNC: 11.9 MG/DL
ERYTHROCYTE [DISTWIDTH] IN BLOOD BY AUTOMATED COUNT: 13.7 %
EST. GFR  (AFRICAN AMERICAN): 3.7 ML/MIN/1.73 M^2
EST. GFR  (NON AFRICAN AMERICAN): 3.2 ML/MIN/1.73 M^2
ESTIMATED AVG GLUCOSE: 71 MG/DL
GLUCOSE SERPL-MCNC: 100 MG/DL
HBA1C MFR BLD HPLC: 4.1 %
HCT VFR BLD AUTO: 31.2 %
HGB BLD-MCNC: 9.8 G/DL
IMM GRANULOCYTES # BLD AUTO: 0.01 K/UL
MCH RBC QN AUTO: 35.5 PG
MCHC RBC AUTO-ENTMCNC: 31.4 G/DL
MCV RBC AUTO: 113 FL
NEUTROPHILS # BLD AUTO: 2.8 K/UL
PLATELET # BLD AUTO: 169 K/UL
PMV BLD AUTO: 10.1 FL
POTASSIUM SERPL-SCNC: 5 MMOL/L
PROT SERPL-MCNC: 6.8 G/DL
RBC # BLD AUTO: 2.76 M/UL
SODIUM SERPL-SCNC: 139 MMOL/L
WBC # BLD AUTO: 5.46 K/UL

## 2018-12-26 PROCEDURE — 36415 COLL VENOUS BLD VENIPUNCTURE: CPT

## 2018-12-26 PROCEDURE — 85027 COMPLETE CBC AUTOMATED: CPT

## 2018-12-26 PROCEDURE — 99214 OFFICE O/P EST MOD 30 MIN: CPT | Mod: S$GLB,,, | Performed by: INTERNAL MEDICINE

## 2018-12-26 PROCEDURE — 80053 COMPREHEN METABOLIC PANEL: CPT

## 2018-12-26 PROCEDURE — 99999 PR PBB SHADOW E&M-EST. PATIENT-LVL IV: CPT | Mod: PBBFAC,,, | Performed by: INTERNAL MEDICINE

## 2018-12-26 PROCEDURE — 3008F BODY MASS INDEX DOCD: CPT | Mod: CPTII,S$GLB,, | Performed by: INTERNAL MEDICINE

## 2018-12-26 PROCEDURE — 83036 HEMOGLOBIN GLYCOSYLATED A1C: CPT

## 2018-12-26 NOTE — PROGRESS NOTES
My name is Dr. Sierra and I am one of Dr. Mccain's partners that is covering the inbox while they are out.  Your A1c is normal. Please feel free to contact me with any questions or concerns.    Sincerely,  Jonathan Sierra

## 2018-12-26 NOTE — PROGRESS NOTES
"Subjective:       Patient ID: Eva Bloom is a 53 y.o. female.    Chief Complaint: RCC    HPI     53 year old female, patient of Dr. Rivera, to clinic for 6 week follow up and to review labs/scans for recurrent metastatic renal cell carcinoma. Pt is currently taking cabozantinib daily. Pt is on ESRD with HD every TTS. Patient tolerating treat extremely well and has no complaints today. She denies any issues with SE.    Today, she denies any mouth sores, nausea, vomiting, diarrhea, constipation,weight loss, chest pain, leg swelling, headache, dizziness, or mood changes.    ECOG 1. She is accompanied to clinic alone.    Oncologic History (From Chart and Patient):  Eva Bloom is a 52 y.o. female with ESRD on HD who was found to have two L renal masses. She underwent L lap nephrectomy on 1/12/16. 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 Margins were negative.  Shehealed from surgery well, but then developed a large ovarian mass.  This was removed by gyn along with multiple sites of metastatic disease in the pelvis.  Path was c/w recurrence of RCC.     11/20/16 Pelvic ultrasound reveals "Large heterogeneous cystic and solid mass which appears to arise from the right ovary with worrisome imaging features for malignancy.  Differential diagnosis includes malignant tumors such as serous or mucinous cystadenocarcinoma.  It is important to note that the patient is at risk for ovarian torsion due to the size of the mass.Multiple uterine fibroids are identified with the largest in the uterine fundus."    11/22/16 pathology reveals "FINAL PATHOLOGIC DIAGNOSIS-RIGHT OVARY AND FALLOPIAN TUBE, RIGHT SALPINGO-OOPHORECTOMY:  -Positive for malignancy, high grade carcinoma morphologically and immunohistochemically consistent with metastasis from the patient's known renal cell carcinoma"    1/23/17 MRI " "abdomen reveals "Status post left nephrectomy, the left nephrectomy bed appears normal.There is a small liver lesion and a small splenic lesion, they cannot be fully characterized but they are unchanged from 5/27/16 suggestive of benign lesions.Multiple right kidney cysts.Cholelithiasis.Soft tissue oval density noted within the mesentery adjacent to the aorta right iliac bifurcation, nonspecific, could represent an enlarged lymph node, it is unchanged from prior studies"    1/23/17- CT Chest reveals "Stable pulmonary nodules, likely benign. No new pulmonary parenchymal abnormalities are identified. Enlarging nodular foci in partially visualized left upper abdomen. Peritoneal implants not excluded. Multiple thyroid nodules, measuring up to 1.6 cm."    4/5/17-MRI abdomen reveals "Again seen are the T2 hyperintense hepatic and splenic lesions that appear similar to prior exam and possibly represents hemangiomas. There are right renal cysts and the patient is status post left nephrectomy."    7/3/17- MRI abdomen "Left nephrectomy.  Right renal cysts with no malignant features.Liver and splenic lesions consistent with cysts versus hemangiomas.Cholelithiasis.Right pleural effusion and right basal atelectasis.Small pericardial effusion.Body wall edema and mesenteric edema."  7/3/17- CT chest "In this patient with a history of left renal cell carcinoma, there are 2 right lung nodules which are stable compared to January 2017.  2 left lung nodules seen on that study are not well-seen today, may be related to a small amount of pleural fluid.Bilateral pleural effusions, mildly decreased compared to prior study.Small amount of pericardial fluid, new."    Review of Systems   Constitutional: Negative for activity change, appetite change, chills, fatigue and fever.   HENT: Negative for congestion, hearing loss, mouth sores, sore throat, tinnitus and voice change.    Eyes: Negative for pain and visual disturbance.   Respiratory: " Negative for cough, shortness of breath and wheezing.    Cardiovascular: Negative for chest pain, palpitations and leg swelling.   Gastrointestinal: Negative for abdominal pain, constipation, diarrhea, nausea and vomiting.   Endocrine: Negative for cold intolerance and heat intolerance.   Genitourinary: Negative for difficulty urinating, dyspareunia, dysuria, frequency, menstrual problem, urgency, vaginal bleeding, vaginal discharge and vaginal pain.   Musculoskeletal: Negative for arthralgias and myalgias.   Skin: Negative for color change, rash and wound.   Allergic/Immunologic: Negative for environmental allergies and food allergies.   Neurological: Negative for weakness, numbness and headaches.   Hematological: Negative for adenopathy. Does not bruise/bleed easily.   Psychiatric/Behavioral: Negative for agitation, confusion, hallucinations and sleep disturbance. The patient is not nervous/anxious.    All other systems reviewed and are negative.      Allergies:  Review of patient's allergies indicates:   Allergen Reactions    Allopurinol      Other reaction(s): abnormal transaminases       Medications:  Current Outpatient Medications   Medication Sig Dispense Refill    acetaminophen (TYLENOL) 500 MG tablet Take 500 mg by mouth every 6 (six) hours as needed for Pain.      aluminum-magnesium hydroxide-simethicone (MAALOX) 200-200-20 mg/5 mL Susp Take 30 mLs by mouth every 6 (six) hours as needed.       amLODIPine (NORVASC) 5 MG tablet Take 1 tablet (5 mg total) by mouth once daily. 90 tablet 3    cabozantinib (CABOMETYX) 60 mg Tab Take 60 mg by mouth once daily. 30 tablet 11    cloNIDine 0.2 mg/24 hr td ptwk (CATAPRES) 0.2 mg/24 hr Place 1 patch onto the skin every 7 days. 12 patch 3    hydrALAZINE (APRESOLINE) 100 MG tablet Take 1 tablet (100 mg total) by mouth every 12 (twelve) hours. 180 tablet 3    lidocaine-prilocaine (EMLA) cream APPLY ATLEAST 30 MINUTES BEFORE TREATMENT 3 TIMES A WEEK 30 g 11     metoprolol succinate (TOPROL-XL) 50 MG 24 hr tablet Take 1 tablet (50 mg total) by mouth once daily. 90 tablet 3    multivitamin-Ca-iron-minerals (ONE-A-DAY WOMENS FORMULA) 27-0.4 mg Tab Take by mouth. 1 Tablet Oral Every day      ondansetron (ZOFRAN-ODT) 8 MG TbDL Take 1 tablet (8 mg total) by mouth every 8 (eight) hours as needed. 30 tablet 1    febuxostat (ULORIC) 40 mg Tab Take 1 tablet (40 mg total) by mouth once daily. 30 tablet 4    isosorbide mononitrate (IMDUR) 30 MG 24 hr tablet Take 1 tablet (30 mg total) by mouth once daily. 90 tablet 3    losartan (COZAAR) 100 MG tablet Take 1 tablet (100 mg total) by mouth once daily. 90 tablet 3    methylPREDNISolone (MEDROL DOSEPACK) 4 mg tablet use as directed 1 Package 0    psyllium husk-calcium (METAMUCIL PLUS CALCIUM) 1-60 gram-mg Cap Take by mouth. 2 Capsule Oral Every morning      sevelamer HCl (RENAGEL) 800 MG Tab Take 2 tablets (1,600 mg total) by mouth 3 (three) times daily with meals. 600 tablet 3     No current facility-administered medications for this visit.        PMH:  Past Medical History:   Diagnosis Date    Anemia     Bronchitis 03/01/2017    Cancer 2016    kidney cancer    CMV (cytomegalovirus) antibody positive     Essential hypertension 9/23/2015    H/O herpes simplex type 2 infection     Herpes simplex type 1 antibody positive     History of kidney cancer     s/p left nephrectomy 1/2016    Hyperparathyroidism, unspecified     Hyperuricemia without signs of inflammatory arthritis and tophaceous disease     Kidney stones     LGSIL (low grade squamous intraepithelial dysplasia)     Prediabetes     Proteinuria     Renal disorder     Thyroid nodule     Urate nephropathy        PSH:  Past Surgical History:   Procedure Laterality Date    BREAST CYST EXCISION      COLONOSCOPY N/A 11/12/2015    Performed [facility] (4[facility] F[facility])    INSERTION-CATHETER-HEMODIALYSIS Right 1/12/2016    Performed by Mode  Goldberg, MD at [facility] OR 2ND FLR    EGGUFGAEW-YZNJO-HCJHQQBRRDAOR LEFT UPPER EXTREMITY Left 1/12/2017    Performed by FARIDEH Muñoz III, MD at [facility] OR 2ND FLR    INSERTION-PERITONEAL DIALYSIS CATHETER-LAPAROSCOPIC N/A 2/26/2016    Performed by Vaughn Baron MD at [facility] OR 2ND FLR    NEPHRECTOMY-LAPAROSCOPIC Left 01/12/2016    NEPHRECTOMY-LAPAROSCOPIC Left 1/12/2016    Performed by Otis Anderson MD at [facility] OR 2ND FLR    PERITONEAL CATHETER INSERTION      Permacath insertion  01/12/2017    PERMCATH REMOVAL-TUNNELED CVC REMOVAL N/A 4/1/2016    Performed by Figueroa Dsouza MD at [facility] CATH LAB    REMOVAL-CATHETER-PERITONEAL DIALYSIS N/A 1/12/2017    Performed by Vaughn Baron MD at [facility] OR 2ND FLR    ROBOT ASSISTED LAPAROSCOPIC SALPINGO-OOPHERECTOMY Right 11/22/2016    Performed by Miguel Sorenson Jr., [facility] at [facility] OR    SALPINGOOPHORECTOMY Right 2016    KJB---DAVINCI    TONSILLECTOMY      TUBAL LIGATION         FamHx:  Family History   Problem Relation Age of Onset    Hypertension Mother     Diabetes Father     Kidney disease Father     No Known Problems Son     No Known Problems Son     Diabetes Maternal Grandfather     No Known Problems Sister     No Known Problems Brother     No Known Problems Maternal Grandmother     No Known Problems Paternal Grandmother     No Known Problems Paternal Grandfather     Heart disease Sister     No Known Problems Sister     No Known Problems Brother     No Known Problems Maternal Aunt     No Known Problems Maternal Uncle     No Known Problems Paternal Aunt     No Known Problems Paternal Uncle     Breast cancer Neg Hx     Colon cancer Neg Hx     Cancer Neg Hx     Stroke Neg Hx     Amblyopia Neg Hx     Blindness Neg Hx     Cataracts Neg Hx     Glaucoma Neg Hx     Macular degeneration Neg Hx     Retinal detachment Neg Hx     Strabismus Neg Hx     Thyroid disease Neg Hx        SocHx:  Social History     Socioeconomic History     Marital status:      Spouse name: Not on file    Number of children: 2    Years of education: Not on file    Highest education level: Not on file   Social Needs    Financial resource strain: Not on file    Food insecurity - worry: Not on file    Food insecurity - inability: Not on file    Transportation needs - medical: Not on file    Transportation needs - non-medical: Not on file   Occupational History    Occupation: [employer]     Employer: [id number]   Tobacco Use    Smoking status: Never Smoker    Smokeless tobacco: Never Used   Substance and Sexual Activity    Alcohol use: No     Comment: . 2 children. works at [employer].    Drug use: No    Sexual activity: Yes     Partners: Male   Other Topics Concern    Not on file   Social History Narrative    Not on file         Objective:      Physical Exam   Constitutional: She is oriented to person, place, and time. She appears well-developed and well-nourished. No distress.   HENT:   Head: Normocephalic and atraumatic.   Nose: Nose normal.   Mouth/Throat: Oropharynx is clear and moist. No oropharyngeal exudate.   Eyes: Conjunctivae and EOM are normal. Pupils are equal, round, and reactive to light. Right eye exhibits no discharge. Left eye exhibits no discharge.   Neck: Normal range of motion. Neck supple. No tracheal deviation present.   Cardiovascular: Normal rate and regular rhythm. Exam reveals no gallop and no friction rub.   Murmur heard.  Pulmonary/Chest: Effort normal and breath sounds normal. No respiratory distress. She has no wheezes. She has no rales.   Abdominal: Soft. Bowel sounds are normal. She exhibits no distension. There is no tenderness. There is no rebound and no guarding.   Genitourinary: No breast tenderness, discharge or bleeding.   Musculoskeletal: Normal range of motion. She exhibits no edema or tenderness.   Neurological: She is alert and oriented to person, place, and time. She has normal reflexes. No  cranial nerve deficit. Coordination normal.   Skin: Skin is warm and dry. No rash noted. She is not diaphoretic. No erythema. No pallor.   Psychiatric: She has a normal mood and affect. Her behavior is normal. Thought content normal.   Nursing note and vitals reviewed.      LABS:  WBC   Date Value Ref Range Status   12/26/2018 5.46 3.90 - 12.70 K/uL Final     Hemoglobin   Date Value Ref Range Status   12/26/2018 9.8 (L) 12.0 - 16.0 g/dL Final     Hematocrit   Date Value Ref Range Status   12/26/2018 31.2 (L) 37.0 - 48.5 % Final     Platelets   Date Value Ref Range Status   12/26/2018 169 150 - 350 K/uL Final       Chemistry        Component Value Date/Time     12/26/2018 1255    K 5.0 12/26/2018 1255    CL 99 12/26/2018 1255    CO2 24 12/26/2018 1255     (H) 12/26/2018 1255    CREATININE 11.9 (H) 12/26/2018 1255     12/26/2018 1255        Component Value Date/Time    CALCIUM 9.2 12/26/2018 1255    ALKPHOS 122 12/26/2018 1255    AST 34 12/26/2018 1255    ALT 24 12/26/2018 1255    BILITOT 1.0 12/26/2018 1255            Assessment:       1. Metastatic renal cell carcinoma to intra-abdominal site    2. End stage renal failure on dialysis          Plan:       1. Metastatic Renal Cell Carcinoma:    - It is unlikely that surgery removed all sites of metastatic disease. Given that this is c/w her previous papillary disease, pt started on oral cabozantinib 60mg daily (for dual VEGF and MET inhibition), especially given recent data that shows cabo is far superior to student in the first line setting.  If pt progresses on this regimen, will switch to nivolumab.     Restaging scans show stable disease and pt tolerating cabozantinib relatively well. We will continue cabozantinib for now.    RTC in 8 weeks with labs (CBC,CMP) and to see Dr. Rivera, will plan for scans q4 mos.        The patient agrees with the plan, and all questions have been answered to their satisfaction.      More than 25 mins were spent  during this encounter, greater than 50% was spent in direct counseling and/or coordination of care.     Ben Rivera M.D., M.S., F.A.C.P.  Hematology and Oncology Attending  [facility]

## 2018-12-27 ENCOUNTER — TELEPHONE (OUTPATIENT)
Dept: HEMATOLOGY/ONCOLOGY | Facility: CLINIC | Age: 53
End: 2018-12-27

## 2018-12-27 DIAGNOSIS — C64.2 METASTATIC RENAL CELL CARCINOMA, LEFT: Primary | ICD-10-CM

## 2018-12-27 NOTE — TELEPHONE ENCOUNTER
----- Message from Ben Rivera MD sent at 12/26/2018  3:39 PM CST -----  RTC in 8 weeks with labs (CBC,CMP) and to see Dr. Rivera

## 2019-01-02 ENCOUNTER — TELEPHONE (OUTPATIENT)
Dept: PHARMACY | Facility: CLINIC | Age: 54
End: 2019-01-02

## 2019-01-02 DIAGNOSIS — C64.9 RENAL CELL CARCINOMA, UNSPECIFIED LATERALITY: ICD-10-CM

## 2019-01-02 NOTE — TELEPHONE ENCOUNTER
----- Message from Judie Brian sent at 1/2/2019 12:44 PM CST -----  Contact: Pt  Pt called to speak with nurse about medication auth   Callback#[phone]  Thank You  HAROLDO Brian

## 2019-01-02 NOTE — TELEPHONE ENCOUNTER
Spoke to patient, she states that [facility] needs a new authorization for medication.

## 2019-01-03 RX ORDER — LIDOCAINE AND PRILOCAINE 25; 25 MG/G; MG/G
CREAM TOPICAL
Qty: 30 G | Refills: 0 | Status: SHIPPED | OUTPATIENT
Start: 2019-01-03 | End: 2019-02-02

## 2019-01-04 DIAGNOSIS — C64.9 RENAL CELL CARCINOMA, UNSPECIFIED LATERALITY: ICD-10-CM

## 2019-01-04 NOTE — TELEPHONE ENCOUNTER
----- Message from Judie Brian sent at 1/4/2019  2:49 PM CST -----  Type:  RX Refill Request    Who Called: fifi Davis or New Rx: refill   RX Name and Strength:  cabozantinib (CABOMETYX) 60 mg Tab  How is the patient currently taking it? (ex. 1XDay): Take 1 tablet (60 mg total) by mouth once daily. - Oral  Is this a 30 day or 90 day RX:   Preferred Pharmacy with phone number: [facility] [phone]  Local or Mail Order:    Ordering Provider:    Best Call Back Number:  [phone]  Additional Information:   Thank You   HAROLDO Brian

## 2019-01-04 NOTE — TELEPHONE ENCOUNTER
"----- Message from Barbara Mei sent at 1/4/2019  3:50 PM CST -----  Regarding: Urgent transfer request  [employer]  prior authorization has been approved  . Patient's insurance requires the patient to fill through [facility]. Please send prescription to [facility], which has been added to the patients EPIC profile. Patient has been notified and provided with the necessary info to call and schedule a delivery.    To complete this in EPIC, the original order MUST be discontinued and re-typed as a new prescription with the updated pharmacy listed. Clicking "reorder" will continue to route the rx to [facility] even if the pharmacy is changed. Please opt the patient out of [facility] when the BPA is fired.          Patient only has doses on hand through 1-7-19     Thank you  Ruth  "

## 2019-01-04 NOTE — TELEPHONE ENCOUNTER
[employer]  prior authorization has been approved  . Patient's insurance requires the patient to fill through [facility]. Please send prescription to [facility], which has been added to the patients EPIC profile. Patient has been notified and provided with the necessary info to call and schedule a delivery.    To complete this in EPIC, the original order MUST be discontinued and re-typed as a new prescription with the updated pharmacy listed. Clicking reorder will continue to route the rx to [facility] even if the pharmacy is changed. Please opt the patient out of [facility] when the BPA is fired.      Sending a staff message to Liset Ngo regarding transfer

## 2019-01-24 ENCOUNTER — TELEPHONE (OUTPATIENT)
Dept: VASCULAR SURGERY | Facility: CLINIC | Age: 54
End: 2019-01-24

## 2019-01-24 NOTE — TELEPHONE ENCOUNTER
Contacted patient in response to message. States she needs to come in because she missed her appt in December. Notified patient she did not have an appt scheduled with Dr. Muñoz in December. Pt states she received a letter in the mail stating she needed to call about her appt. Notified patient this was likely a recall letter and she is not due to see Dr. Muñoz until April 2019. Pt verbalized understanding and states she can reschedule appt to April. Appts rescheduled, pt verified. Appt letter placed in mail. ----- Message from Mary Nuñez sent at 1/24/2019  9:55 AM CST -----  Contact: Patient   Needs Advice    Reason for call: patient needs lab schedule for appointment on 2/1        Communication Preference: [phone]    Additional Information: please contact the patient with an arrival time

## 2019-02-14 ENCOUNTER — TELEPHONE (OUTPATIENT)
Dept: HEMATOLOGY/ONCOLOGY | Facility: CLINIC | Age: 54
End: 2019-02-14

## 2019-02-14 NOTE — TELEPHONE ENCOUNTER
----- Message from Sonia Narvaez sent at 2/14/2019  1:59 PM CST -----  Contact: Pt   Pt will need to reschedule the 2/18 appt for next week   Please contact her at [phone]    Pt prefers a Mon., Wed,. Or Friday if possible

## 2019-02-15 ENCOUNTER — TELEPHONE (OUTPATIENT)
Dept: HEMATOLOGY/ONCOLOGY | Facility: CLINIC | Age: 54
End: 2019-02-15

## 2019-02-22 NOTE — PROGRESS NOTES
"Subjective:       Patient ID: Eva Bloom is a 53 y.o. female.    Chief Complaint: RCC    HPI     53 y.o.female, patient of Dr. Rivera, to clinic for 8 week follow up and to review labs for recurrent metastatic renal cell carcinoma. Pt is currently taking cabozantinib daily. Pt is on ESRD with HD every TTS. Patient tolerating treat extremely well. She reports sinus congestion, productive cough and sore throat going on the 2nd week. She denies fever/chills.     Today, she denies any mouth sores, nausea, vomiting, diarrhea, constipation,weight loss, chest pain, leg swelling, headache, dizziness, or mood changes.    ECOG 1. She is accompanied with her .    Oncologic History (From Chart and Patient):  Eva Bloom is a 52 y.o. female with ESRD on HD who was found to have two L renal masses. She underwent L lap nephrectomy on 1/12/16. 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 Margins were negative.  Shehealed from surgery well, but then developed a large ovarian mass.  This was removed by gyn along with multiple sites of metastatic disease in the pelvis.  Path was c/w recurrence of RCC.     11/20/16 Pelvic ultrasound reveals "Large heterogeneous cystic and solid mass which appears to arise from the right ovary with worrisome imaging features for malignancy.  Differential diagnosis includes malignant tumors such as serous or mucinous cystadenocarcinoma.  It is important to note that the patient is at risk for ovarian torsion due to the size of the mass.Multiple uterine fibroids are identified with the largest in the uterine fundus."    11/22/16 pathology reveals "FINAL PATHOLOGIC DIAGNOSIS-RIGHT OVARY AND FALLOPIAN TUBE, RIGHT SALPINGO-OOPHORECTOMY:  -Positive for malignancy, high grade carcinoma morphologically and immunohistochemically consistent with metastasis from the patient's " "known renal cell carcinoma"    1/23/17 MRI abdomen reveals "Status post left nephrectomy, the left nephrectomy bed appears normal.There is a small liver lesion and a small splenic lesion, they cannot be fully characterized but they are unchanged from 5/27/16 suggestive of benign lesions.Multiple right kidney cysts.Cholelithiasis.Soft tissue oval density noted within the mesentery adjacent to the aorta right iliac bifurcation, nonspecific, could represent an enlarged lymph node, it is unchanged from prior studies"    1/23/17- CT Chest reveals "Stable pulmonary nodules, likely benign. No new pulmonary parenchymal abnormalities are identified. Enlarging nodular foci in partially visualized left upper abdomen. Peritoneal implants not excluded. Multiple thyroid nodules, measuring up to 1.6 cm."    4/5/17-MRI abdomen reveals "Again seen are the T2 hyperintense hepatic and splenic lesions that appear similar to prior exam and possibly represents hemangiomas. There are right renal cysts and the patient is status post left nephrectomy."    7/3/17- MRI abdomen "Left nephrectomy.  Right renal cysts with no malignant features.Liver and splenic lesions consistent with cysts versus hemangiomas.Cholelithiasis.Right pleural effusion and right basal atelectasis.Small pericardial effusion.Body wall edema and mesenteric edema."  7/3/17- CT chest "In this patient with a history of left renal cell carcinoma, there are 2 right lung nodules which are stable compared to January 2017.  2 left lung nodules seen on that study are not well-seen today, may be related to a small amount of pleural fluid.Bilateral pleural effusions, mildly decreased compared to prior study.Small amount of pericardial fluid, new."    Review of Systems   Constitutional: Negative for activity change, appetite change, chills, fatigue and fever.   HENT: Positive for congestion, postnasal drip and sore throat. Negative for hearing loss, mouth sores, tinnitus and voice " change.    Eyes: Negative for pain and visual disturbance.   Respiratory: Positive for cough. Negative for shortness of breath and wheezing.    Cardiovascular: Negative for chest pain, palpitations and leg swelling.   Gastrointestinal: Negative for abdominal pain, constipation, diarrhea, nausea and vomiting.   Endocrine: Negative for cold intolerance and heat intolerance.   Genitourinary: Negative for difficulty urinating, dyspareunia, dysuria, frequency, menstrual problem, urgency, vaginal bleeding, vaginal discharge and vaginal pain.   Musculoskeletal: Negative for arthralgias and myalgias.   Skin: Negative for color change, rash and wound.   Allergic/Immunologic: Negative for environmental allergies and food allergies.   Neurological: Negative for weakness, numbness and headaches.   Hematological: Negative for adenopathy. Does not bruise/bleed easily.   Psychiatric/Behavioral: Negative for agitation, confusion, hallucinations and sleep disturbance. The patient is not nervous/anxious.    All other systems reviewed and are negative.      Allergies:  Review of patient's allergies indicates:   Allergen Reactions    Allopurinol      Other reaction(s): abnormal transaminases       Medications:  Current Outpatient Medications   Medication Sig Dispense Refill    acetaminophen (TYLENOL) 500 MG tablet Take 500 mg by mouth every 6 (six) hours as needed for Pain.      aluminum-magnesium hydroxide-simethicone (MAALOX) 200-200-20 mg/5 mL Susp Take 30 mLs by mouth every 6 (six) hours as needed.       amLODIPine (NORVASC) 5 MG tablet Take 1 tablet (5 mg total) by mouth once daily. 90 tablet 3    cabozantinib (CABOMETYX) 60 mg Tab Take 1 tablet (60 mg total) by mouth once daily. 30 tablet 11    cloNIDine 0.2 mg/24 hr td ptwk (CATAPRES) 0.2 mg/24 hr Place 1 patch onto the skin every 7 days. 12 patch 3    febuxostat (ULORIC) 40 mg Tab Take 1 tablet (40 mg total) by mouth once daily. 30 tablet 4    hydrALAZINE (APRESOLINE)  100 MG tablet Take 1 tablet (100 mg total) by mouth every 12 (twelve) hours. 180 tablet 3    isosorbide mononitrate (IMDUR) 30 MG 24 hr tablet Take 1 tablet (30 mg total) by mouth once daily. 90 tablet 3    lidocaine-prilocaine (EMLA) cream APPLY ATLEAST 30 MINUTES BEFORE TREATMENT 3 TIMES A WEEK 30 g 11    metoprolol succinate (TOPROL-XL) 50 MG 24 hr tablet Take 1 tablet (50 mg total) by mouth once daily. 90 tablet 3    multivitamin-Ca-iron-minerals (ONE-A-DAY WOMENS FORMULA) 27-0.4 mg Tab Take by mouth. 1 Tablet Oral Every day      ondansetron (ZOFRAN-ODT) 8 MG TbDL Take 1 tablet (8 mg total) by mouth every 8 (eight) hours as needed. 30 tablet 1    azithromycin (ZITHROMAX Z-STELLA) 250 MG tablet Take 2 tablets (500 mg) on  Day 1,  followed by 1 tablet (250 mg) once daily on Days 2 through 5. 6 tablet 0    benzonatate (TESSALON PERLES) 100 MG capsule Take 1 capsule (100 mg total) by mouth 3 (three) times daily as needed for Cough. 45 capsule 0    losartan (COZAAR) 100 MG tablet Take 1 tablet (100 mg total) by mouth once daily. 90 tablet 3    psyllium husk-calcium (METAMUCIL PLUS CALCIUM) 1-60 gram-mg Cap Take by mouth. 2 Capsule Oral Every morning      sevelamer HCl (RENAGEL) 800 MG Tab Take 2 tablets (1,600 mg total) by mouth 3 (three) times daily with meals. 600 tablet 3     No current facility-administered medications for this visit.        PMH:  Past Medical History:   Diagnosis Date    Anemia     Bronchitis 03/01/2017    Cancer 2016    kidney cancer    CMV (cytomegalovirus) antibody positive     Essential hypertension 9/23/2015    H/O herpes simplex type 2 infection     Herpes simplex type 1 antibody positive     History of kidney cancer     s/p left nephrectomy 1/2016    Hyperparathyroidism, unspecified     Hyperuricemia without signs of inflammatory arthritis and tophaceous disease     Kidney stones     LGSIL (low grade squamous intraepithelial dysplasia)     Prediabetes     Proteinuria      Renal disorder     Thyroid nodule     Urate nephropathy        PSH:  Past Surgical History:   Procedure Laterality Date    BREAST CYST EXCISION      COLONOSCOPY N/A 11/12/2015    Performed by [facility]re Skinner MD at [facility] ENDO (4TH FLR)    INSERTION-CATHETER-HEMODIALYSIS Right 1/12/2016    Performed by Joshua Goldberg, MD at [facility] OR 2ND FLR    [id number] LEFT UPPER EXTREMITY Left 1/12/2017    Performed by FARIDEH Muñoz III, MD at [facility] OR 2ND FLR    INSERTION-PERITONEAL DIALYSIS CATHETER-LAPAROSCOPIC N/A 2/26/2016    Performed by Vaughn Baron MD at [facility] OR 2ND FLR    NEPHRECTOMY-LAPAROSCOPIC Left 01/12/2016    NEPHRECTOMY-LAPAROSCOPIC Left 1/12/2016    Performed by Otis Anderson MD at [facility] OR 2ND FLR    PERITONEAL CATHETER INSERTION      Permacath insertion  01/12/2017    PERMCATH REMOVAL-TUNNELED CVC REMOVAL N/A 4/1/2016    Performed by Figueroa Dsouza MD at [facility] CATH LAB    REMOVAL-CATHETER-PERITONEAL DIALYSIS N/A 1/12/2017    Performed by Vaughn Baron MD at [facility] OR 2ND FLR    ROBOT ASSISTED LAPAROSCOPIC SALPINGO-OOPHERECTOMY Right 11/22/2016    [facility]son Jr[facility] OR    SALPINGOOPHORECTOMY Right 2016    KJB---DAVINCI    TONSILLECTOMY      TUBAL LIGATION         FamHx:  Family History   Problem Relation Age of Onset    Hypertension Mother     Diabetes Father     Kidney disease Father     No Known Problems Son     No Known Problems Son     Diabetes Maternal Grandfather     No Known Problems Sister     No Known Problems Brother     No Known Problems Maternal Grandmother     No Known Problems Paternal Grandmother     No Known Problems Paternal Grandfather     Heart disease Sister     No Known Problems Sister     No Known Problems Brother     No Known Problems Maternal Aunt     No Known Problems Maternal Uncle     No Known Problems Paternal Aunt     No Known Problems Paternal Uncle     Breast cancer Neg Hx      Colon cancer Neg Hx     Cancer Neg Hx     Stroke Neg Hx     Amblyopia Neg Hx     Blindness Neg Hx     Cataracts Neg Hx     Glaucoma Neg Hx     Macular degeneration Neg Hx     Retinal detachment Neg Hx     Strabismus Neg Hx     Thyroid disease Neg Hx        SocHx:  Social History     Socioeconomic History    Marital status:      Spouse name: Not on file    Number of children: 2    Years of education: Not on file    Highest education level: Not on file   Social Needs    Financial resource strain: Not on file    Food insecurity - worry: Not on file    Food insecurity - inability: Not on file    Transportation needs - medical: Not on file    Transportation needs - non-medical: Not on file   Occupational History    Occupation: [employer]     Employer: [id number]   Tobacco Use    Smoking status: Never Smoker    Smokeless tobacco: Never Used   Substance and Sexual Activity    Alcohol use: No     Comment: . 2 children. works at [employer].    Drug use: No    Sexual activity: Yes     Partners: Male   Other Topics Concern    Not on file   Social History Narrative    Not on file         Objective:       Vitals:    02/25/19 1200   BP: (!) 180/80   Pulse:    Resp:    Temp:        Physical Exam   Constitutional: She is oriented to person, place, and time. She appears well-developed and well-nourished. No distress.   HENT:   Head: Normocephalic and atraumatic.   Nose: Nose normal.   Mouth/Throat: Oropharynx is clear and moist. No oropharyngeal exudate.   Eyes: Conjunctivae and EOM are normal. Pupils are equal, round, and reactive to light. Right eye exhibits no discharge. Left eye exhibits no discharge.   Neck: Normal range of motion. Neck supple. No tracheal deviation present.   Cardiovascular: Normal rate and regular rhythm. Exam reveals no gallop and no friction rub.   Murmur heard.  Pulmonary/Chest: Effort normal and breath sounds normal. No respiratory distress. She has no  wheezes. She has no rales.   +wet sounding cough but lungs CTA   Abdominal: Soft. Bowel sounds are normal. She exhibits no distension. There is no tenderness. There is no rebound and no guarding.   Genitourinary: No breast tenderness, discharge or bleeding.   Musculoskeletal: Normal range of motion. She exhibits no edema or tenderness.   Neurological: She is alert and oriented to person, place, and time. She has normal reflexes. No cranial nerve deficit. Coordination normal.   Skin: Skin is warm and dry. No rash noted. She is not diaphoretic. No erythema. No pallor.   Psychiatric: She has a normal mood and affect. Her behavior is normal. Thought content normal.   Nursing note and vitals reviewed.      LABS:  WBC   Date Value Ref Range Status   02/25/2019 5.74 3.90 - 12.70 K/uL Final     Hemoglobin   Date Value Ref Range Status   02/25/2019 11.6 (L) 12.0 - 16.0 g/dL Final     Hematocrit   Date Value Ref Range Status   02/25/2019 37.1 37.0 - 48.5 % Final     Platelets   Date Value Ref Range Status   02/25/2019 227 150 - 350 K/uL Final       Chemistry        Component Value Date/Time     02/25/2019 1026    K 5.0 02/25/2019 1026     02/25/2019 1026    CO2 24 02/25/2019 1026    BUN 95 (H) 02/25/2019 1026    CREATININE 10.8 (H) 02/25/2019 1026     (H) 02/25/2019 1026        Component Value Date/Time    CALCIUM 9.4 02/25/2019 1026    ALKPHOS 147 (H) 02/25/2019 1026    AST 31 02/25/2019 1026    ALT 20 02/25/2019 1026    BILITOT 0.7 02/25/2019 1026            Assessment:       1. Metastatic renal cell carcinoma, left    2. Metastatic renal cell carcinoma to intra-abdominal site    3. End stage renal failure on dialysis    4. S/p nephrectomy left    5. Anemia due to end stage renal disease    6. Combined systolic and diastolic cardiac dysfunction    7. Hypertension, renal disease    8. Upper respiratory tract infection, unspecified type    9. Cough          Plan:       1,2,3,4- Metastatic Renal Cell  Carcinoma:    - It is unlikely that surgery removed all sites of metastatic disease. Given that this is c/w her previous papillary disease, pt started on oral cabozantinib 60mg daily (for dual VEGF and MET inhibition), especially given recent data that shows cabo is far superior to student in the first line setting.  If pt progresses on this regimen, will switch to nivolumab.     Last restaging scans show stable disease and pt tolerating cabozantinib relatively well. We will continue cabozantinib for now. Patient has minimal side effects from medication. Per Dr. Rivera, will plan for scans q4 mos (next due mid April).      RTC in 8 weeks with abdominal MRI, CT chest, labs (CBC,CMP) and to see Dr. Rivera.     5- Improved.Will monitor.     6,7- BP elevated in clinic. Manual 180/80. Following with cardiology and PCP to manage.      8- Start Z-pack.    9- Start Tessalon Perls.    Patient is in agreement with the proposed treatment plan. All questions were answered to the patient's satisfaction. Pt knows to call clinic if anything is needed before the next clinic visit.    Liset Ngo, TONA-C  Hematology and Medical Oncology

## 2019-02-25 ENCOUNTER — OFFICE VISIT (OUTPATIENT)
Dept: HEMATOLOGY/ONCOLOGY | Facility: CLINIC | Age: 54
End: 2019-02-25
Payer: COMMERCIAL

## 2019-02-25 ENCOUNTER — LAB VISIT (OUTPATIENT)
Dept: LAB | Facility: HOSPITAL | Age: 54
End: 2019-02-25
Attending: INTERNAL MEDICINE
Payer: COMMERCIAL

## 2019-02-25 VITALS
HEART RATE: 76 BPM | WEIGHT: 132.5 LBS | DIASTOLIC BLOOD PRESSURE: 80 MMHG | SYSTOLIC BLOOD PRESSURE: 180 MMHG | OXYGEN SATURATION: 98 % | BODY MASS INDEX: 22.62 KG/M2 | TEMPERATURE: 98 F | HEIGHT: 64 IN | RESPIRATION RATE: 18 BRPM

## 2019-02-25 DIAGNOSIS — J06.9 UPPER RESPIRATORY TRACT INFECTION, UNSPECIFIED TYPE: ICD-10-CM

## 2019-02-25 DIAGNOSIS — Z90.5 S/P NEPHRECTOMY: ICD-10-CM

## 2019-02-25 DIAGNOSIS — C79.89 METASTATIC RENAL CELL CARCINOMA TO INTRA-ABDOMINAL SITE: ICD-10-CM

## 2019-02-25 DIAGNOSIS — I12.9 HYPERTENSION, RENAL DISEASE: ICD-10-CM

## 2019-02-25 DIAGNOSIS — N18.6 END STAGE RENAL FAILURE ON DIALYSIS: ICD-10-CM

## 2019-02-25 DIAGNOSIS — C64.9 METASTATIC RENAL CELL CARCINOMA TO INTRA-ABDOMINAL SITE: ICD-10-CM

## 2019-02-25 DIAGNOSIS — C64.2 METASTATIC RENAL CELL CARCINOMA, LEFT: ICD-10-CM

## 2019-02-25 DIAGNOSIS — I51.89 COMBINED SYSTOLIC AND DIASTOLIC CARDIAC DYSFUNCTION: ICD-10-CM

## 2019-02-25 DIAGNOSIS — N18.6 ANEMIA DUE TO END STAGE RENAL DISEASE: ICD-10-CM

## 2019-02-25 DIAGNOSIS — Z99.2 END STAGE RENAL FAILURE ON DIALYSIS: ICD-10-CM

## 2019-02-25 DIAGNOSIS — R05.9 COUGH: ICD-10-CM

## 2019-02-25 DIAGNOSIS — D63.1 ANEMIA DUE TO END STAGE RENAL DISEASE: ICD-10-CM

## 2019-02-25 DIAGNOSIS — C64.2 METASTATIC RENAL CELL CARCINOMA, LEFT: Primary | ICD-10-CM

## 2019-02-25 LAB
ALBUMIN SERPL BCP-MCNC: 2.9 G/DL
ALP SERPL-CCNC: 147 U/L
ALT SERPL W/O P-5'-P-CCNC: 20 U/L
ANION GAP SERPL CALC-SCNC: 13 MMOL/L
AST SERPL-CCNC: 31 U/L
BILIRUB SERPL-MCNC: 0.7 MG/DL
BUN SERPL-MCNC: 95 MG/DL
CALCIUM SERPL-MCNC: 9.4 MG/DL
CHLORIDE SERPL-SCNC: 103 MMOL/L
CO2 SERPL-SCNC: 24 MMOL/L
CREAT SERPL-MCNC: 10.8 MG/DL
ERYTHROCYTE [DISTWIDTH] IN BLOOD BY AUTOMATED COUNT: 13.7 %
EST. GFR  (AFRICAN AMERICAN): 4.2 ML/MIN/1.73 M^2
EST. GFR  (NON AFRICAN AMERICAN): 3.6 ML/MIN/1.73 M^2
GLUCOSE SERPL-MCNC: 120 MG/DL
HCT VFR BLD AUTO: 37.1 %
HGB BLD-MCNC: 11.6 G/DL
IMM GRANULOCYTES # BLD AUTO: 0.02 K/UL
MCH RBC QN AUTO: 35.7 PG
MCHC RBC AUTO-ENTMCNC: 31.3 G/DL
MCV RBC AUTO: 114 FL
NEUTROPHILS # BLD AUTO: 3.1 K/UL
PLATELET # BLD AUTO: 227 K/UL
PMV BLD AUTO: 10 FL
POTASSIUM SERPL-SCNC: 5 MMOL/L
PROT SERPL-MCNC: 7 G/DL
RBC # BLD AUTO: 3.25 M/UL
SODIUM SERPL-SCNC: 140 MMOL/L
WBC # BLD AUTO: 5.74 K/UL

## 2019-02-25 PROCEDURE — 3008F PR BODY MASS INDEX (BMI) DOCUMENTED: ICD-10-PCS | Mod: CPTII,S$GLB,, | Performed by: NURSE PRACTITIONER

## 2019-02-25 PROCEDURE — 80053 COMPREHEN METABOLIC PANEL: CPT

## 2019-02-25 PROCEDURE — 99999 PR PBB SHADOW E&M-EST. PATIENT-LVL IV: CPT | Mod: PBBFAC,,, | Performed by: NURSE PRACTITIONER

## 2019-02-25 PROCEDURE — 99214 OFFICE O/P EST MOD 30 MIN: CPT | Mod: S$GLB,,, | Performed by: NURSE PRACTITIONER

## 2019-02-25 PROCEDURE — 36415 COLL VENOUS BLD VENIPUNCTURE: CPT

## 2019-02-25 PROCEDURE — 99214 PR OFFICE/OUTPT VISIT, EST, LEVL IV, 30-39 MIN: ICD-10-PCS | Mod: S$GLB,,, | Performed by: NURSE PRACTITIONER

## 2019-02-25 PROCEDURE — 99999 PR PBB SHADOW E&M-EST. PATIENT-LVL IV: ICD-10-PCS | Mod: PBBFAC,,, | Performed by: NURSE PRACTITIONER

## 2019-02-25 PROCEDURE — 3008F BODY MASS INDEX DOCD: CPT | Mod: CPTII,S$GLB,, | Performed by: NURSE PRACTITIONER

## 2019-02-25 PROCEDURE — 85027 COMPLETE CBC AUTOMATED: CPT

## 2019-02-25 RX ORDER — AZITHROMYCIN 250 MG/1
TABLET, FILM COATED ORAL
Qty: 6 TABLET | Refills: 0 | Status: SHIPPED | OUTPATIENT
Start: 2019-02-25 | End: 2019-03-02

## 2019-02-25 RX ORDER — BENZONATATE 100 MG/1
100 CAPSULE ORAL 3 TIMES DAILY PRN
Qty: 45 CAPSULE | Refills: 0 | Status: SHIPPED | OUTPATIENT
Start: 2019-02-25 | End: 2019-03-12

## 2019-03-06 ENCOUNTER — TELEPHONE (OUTPATIENT)
Dept: HEMATOLOGY/ONCOLOGY | Facility: CLINIC | Age: 54
End: 2019-03-06

## 2019-03-06 NOTE — TELEPHONE ENCOUNTER
----- Message from Ruth Stephens PharmD sent at 3/6/2019  9:36 AM CST -----  Contact: Clare sharma/ [facility]   Justin Mayes.    Ms. Bloom has to fill with [facility].  Looks like they should have a script with plenty of refills.  We have not received anything as it should come directly to you.      If you need the phone number to [facility] to give them a call and check status, you should be able to reach them at [phone].    Hope this helps!    Ruth Stephens, Pharm D.   [facility]  [phone]    ----- Message -----  From: Gerber Butler RN  Sent: 3/6/2019   8:53 AM  To: Specialty Clinical Pharmacists    I have not received anything requesting a prior auth for her cabo, have you received anything?   ----- Message -----  From: Sonia Narvaez  Sent: 3/6/2019   8:48 AM  To: Miguel Branes    Prior authorization needed for :  - cabozantinib (CABOMETYX) 60 mg Tab  Only has two days left of the medication .   Verbal authorization accepted by calling [phone]     or fax it to [phone]

## 2019-03-06 NOTE — TELEPHONE ENCOUNTER
----- Message from Sonia Narvaez sent at 3/6/2019  8:48 AM CST -----  Contact: Clare sharma/ [facility]   Prior authorization needed for :  - cabozantinib (CABOMETYX) 60 mg Tab  Only has two days left of the medication .   Verbal authorization accepted by calling [phone]     or fax it to [phone]

## 2019-03-07 ENCOUNTER — TELEPHONE (OUTPATIENT)
Dept: HEMATOLOGY/ONCOLOGY | Facility: CLINIC | Age: 54
End: 2019-03-07

## 2019-03-07 NOTE — TELEPHONE ENCOUNTER
----- Message from Jean Paul Rios sent at 3/7/2019  3:40 PM CST -----  Contact: [facility]  Pharmacy requesting a prior auth on Rx cabozantinib (CABOMETYX) 60 mg Tab    Contact::  [phone]

## 2019-03-12 ENCOUNTER — TELEPHONE (OUTPATIENT)
Dept: HEMATOLOGY/ONCOLOGY | Facility: CLINIC | Age: 54
End: 2019-03-12

## 2019-03-12 NOTE — TELEPHONE ENCOUNTER
Spoke to patient, she states she always gets [facility], let her know as of January of the year [facility]. She states she no longer has the same insurance. Unable to tell me if she has updated it with us. She will come in today and bring her current insurance card to verify everything is up to date.

## 2019-03-12 NOTE — TELEPHONE ENCOUNTER
----- Message from Judie Brian sent at 3/12/2019 10:48 AM CDT -----  Contact: pt   Pt called to speak with nurse about medication have some questions   Callback#[phone]  Thank You  HAROLDO Brian

## 2019-03-12 NOTE — TELEPHONE ENCOUNTER
----- Message from Judie Brian sent at 3/12/2019  9:55 AM CDT -----  Type:  Pharmacy Calling to Clarify an RX    Name of Caller:  Eleazar   [facility]  Prescription Name:  cabozantinib (CABOMETYX) 60 mg Tab  What do they need to clarify?: need auth   Best Call Back Number: [phone]  Additional Information:   Thank You  HAROLDO Brian

## 2019-03-13 ENCOUNTER — TELEPHONE (OUTPATIENT)
Dept: HEMATOLOGY/ONCOLOGY | Facility: CLINIC | Age: 54
End: 2019-03-13

## 2019-03-13 DIAGNOSIS — C64.9 RENAL CELL CARCINOMA, UNSPECIFIED LATERALITY: ICD-10-CM

## 2019-03-13 NOTE — TELEPHONE ENCOUNTER
----- Message from Asad Farah PharmD sent at 3/12/2019  5:14 PM CDT -----  Hey, please send us a Rx order so we can work up authorization with new insurance.  Once benefits are confirmed we will let ya know whether or not insurance requires a particular pharmacy  ----- Message -----  From: Gerber Butler RN  Sent: 3/12/2019   4:54 PM  To: Specialty Clinical Pharmacists    Patient brought in updated insurance information. Can someone check and make sure her cabo should still go to [facility]. She states it needs to go to [facility] specialty

## 2019-03-13 NOTE — TELEPHONE ENCOUNTER
----- Message from Judie Brian sent at 3/13/2019 11:15 AM CDT -----  Contact: pt   Pt called to speak with nurse have some questions   Callback#[phone]  Thank You  HAROLDO Brian

## 2019-03-13 NOTE — TELEPHONE ENCOUNTER
Spoke to patient, updated her on prescriptions status.Let her know we resent the prescription to our specialty pharmacy and they are reworking the authorization and we should know something this afternoon.

## 2019-03-14 ENCOUNTER — TELEPHONE (OUTPATIENT)
Dept: HEMATOLOGY/ONCOLOGY | Facility: CLINIC | Age: 54
End: 2019-03-14

## 2019-03-14 NOTE — TELEPHONE ENCOUNTER
----- Message from Jean Paul Rios sent at 3/14/2019  2:59 PM CDT -----  Contact: Patient  Pt needs auth called in for Rx cabozantinib (CABOMETYX) 60 mg Tab to the pharmacy.    Contact:: [phone]

## 2019-03-14 NOTE — TELEPHONE ENCOUNTER
----- Message from Domonique Orozco sent at 3/14/2019  2:46 PM CDT -----  Contact: pt  Pt would like to be called back regarding on her medication she needs a authorized     Pt can be reached at [phone]

## 2019-03-15 ENCOUNTER — TELEPHONE (OUTPATIENT)
Dept: HEMATOLOGY/ONCOLOGY | Facility: CLINIC | Age: 54
End: 2019-03-15

## 2019-03-15 ENCOUNTER — TELEPHONE (OUTPATIENT)
Dept: PHARMACY | Facility: CLINIC | Age: 54
End: 2019-03-15

## 2019-03-15 ENCOUNTER — PATIENT MESSAGE (OUTPATIENT)
Dept: ADMINISTRATIVE | Facility: OTHER | Age: 54
End: 2019-03-15

## 2019-03-15 DIAGNOSIS — C64.9 METASTATIC RENAL CELL CARCINOMA, UNSPECIFIED LATERALITY: Primary | ICD-10-CM

## 2019-03-15 NOTE — TELEPHONE ENCOUNTER
Spoke to patient let her know that medication was approved and pharmacy should be reaching out to get her scheduled for shipment. She states they already have a shipment sent up. She will call the office with any other needs.

## 2019-03-15 NOTE — TELEPHONE ENCOUNTER
DOCUMENTATION ONLY:  Prior authorization for Cabometyx approved from 03/14/2019 to 03/14/2020  Case ID: [id number]    Co-pay: $100.00    Patient Assistance IS required. Pili WALLACE      FOR DOCUMENTATION ONLY:  Financial Assistance for Cabometyx approved from 03/15/2019 to 12/31/2019  Source: Cabometyx Copay Card  BIN: [id number]  PCN: [id number]  ID: [id number]  GRP: [id number] Pili WALLACE

## 2019-03-15 NOTE — TELEPHONE ENCOUNTER
----- Message from Lucia Collado sent at 3/15/2019  8:16 AM CDT -----  Regarding: Cabometyx  Good morning,   Cabometyx prior authorization has been approved through 03/14/2020. Patient's insurance requires the patient to fill through [facility]. Please send prescription to [facility], which has been added to the patients EPIC profile. Patient has been notified and provided with the necessary info to call and schedule a delivery.    To complete this in EPIC, the original order MUST be discontinued and re-typed as a new prescription with the updated pharmacy listed. Clicking reorder will continue to route the rx to [facility] even if the pharmacy is changed. Please opt the patient out of [facility] when the BPA is fired. If you have any questions or concerns please contact me at [facility].     Thank you,   Lucia Collado CPhT  [facility]   Phone: [phone]  Fax:[phone]  [phone]

## 2019-03-19 ENCOUNTER — TELEPHONE (OUTPATIENT)
Dept: HEMATOLOGY/ONCOLOGY | Facility: CLINIC | Age: 54
End: 2019-03-19

## 2019-03-19 NOTE — TELEPHONE ENCOUNTER
----- Message from Miranda Harding sent at 3/19/2019 10:08 AM CDT -----  Contact: pt  Name of Who is Calling: pt      What is the request in detail: pt is requesting to reschedule ct and mri on Saturday for a later time. Call pt. Nothing available to schedule on a Saturday.      Can the clinic reply by [facility]: no      What Number to Call Back if not in [facility]: [phone]

## 2019-03-26 ENCOUNTER — TELEPHONE (OUTPATIENT)
Dept: HEMATOLOGY/ONCOLOGY | Facility: CLINIC | Age: 54
End: 2019-03-26

## 2019-03-26 RX ORDER — OLMESARTAN MEDOXOMIL 40 MG/1
40 TABLET ORAL DAILY
Qty: 90 TABLET | Refills: 3 | Status: SHIPPED | OUTPATIENT
Start: 2019-03-26 | End: 2020-03-02

## 2019-03-26 NOTE — TELEPHONE ENCOUNTER
spoke with pt on today in regards to rescheduling appointments due to dialysis ,pt is aware and has confirm.

## 2019-04-05 ENCOUNTER — OFFICE VISIT (OUTPATIENT)
Dept: VASCULAR SURGERY | Facility: CLINIC | Age: 54
End: 2019-04-05
Payer: COMMERCIAL

## 2019-04-05 ENCOUNTER — HOSPITAL ENCOUNTER (OUTPATIENT)
Dept: VASCULAR SURGERY | Facility: CLINIC | Age: 54
Discharge: HOME | End: 2019-04-05
Attending: SURGERY
Payer: COMMERCIAL

## 2019-04-05 VITALS
HEIGHT: 64 IN | BODY MASS INDEX: 21.83 KG/M2 | DIASTOLIC BLOOD PRESSURE: 79 MMHG | HEART RATE: 74 BPM | TEMPERATURE: 98 F | SYSTOLIC BLOOD PRESSURE: 174 MMHG | WEIGHT: 127.88 LBS

## 2019-04-05 DIAGNOSIS — Z99.2 ESRD (END STAGE RENAL DISEASE) ON DIALYSIS: ICD-10-CM

## 2019-04-05 DIAGNOSIS — N18.6 ESRD (END STAGE RENAL DISEASE) ON DIALYSIS: Primary | ICD-10-CM

## 2019-04-05 DIAGNOSIS — Z99.2 ESRD (END STAGE RENAL DISEASE) ON DIALYSIS: Primary | ICD-10-CM

## 2019-04-05 DIAGNOSIS — N18.6 ESRD (END STAGE RENAL DISEASE) ON DIALYSIS: ICD-10-CM

## 2019-04-05 PROCEDURE — 93990 PR DUPLEX HEMODIALYSIS ACCESS: ICD-10-PCS | Mod: S$GLB,,, | Performed by: SURGERY

## 2019-04-05 PROCEDURE — 3008F BODY MASS INDEX DOCD: CPT | Mod: CPTII,S$GLB,, | Performed by: SURGERY

## 2019-04-05 PROCEDURE — 99213 OFFICE O/P EST LOW 20 MIN: CPT | Mod: S$GLB,,, | Performed by: SURGERY

## 2019-04-05 PROCEDURE — 93990 DOPPLER FLOW TESTING: CPT | Mod: S$GLB,,, | Performed by: SURGERY

## 2019-04-05 PROCEDURE — 99999 PR PBB SHADOW E&M-EST. PATIENT-LVL III: CPT | Mod: PBBFAC,,, | Performed by: SURGERY

## 2019-04-05 PROCEDURE — 99213 PR OFFICE/OUTPT VISIT, EST, LEVL III, 20-29 MIN: ICD-10-PCS | Mod: S$GLB,,, | Performed by: SURGERY

## 2019-04-05 PROCEDURE — 3008F PR BODY MASS INDEX (BMI) DOCUMENTED: ICD-10-PCS | Mod: CPTII,S$GLB,, | Performed by: SURGERY

## 2019-04-05 PROCEDURE — 99999 PR PBB SHADOW E&M-EST. PATIENT-LVL III: ICD-10-PCS | Mod: PBBFAC,,, | Performed by: SURGERY

## 2019-04-05 NOTE — PROGRESS NOTES
See Dr. Muñoz's prior notes, review of systems, family history and social history are   unchanged.    HISTORY OF PRESENT ILLNESS:  A 53-year-old female with end-stage renal disease,   well known to Dr. Muñoz, status post,  1.  Covered stent placement, left AV fistula outflow tract, 03/06/2017.  2.  Left upper arm 4-7 tapered AV graft on 01/12/2017.    Of note, at the time of surgery was found to have an exceptionally thin and   friable 4 mm brachial vein, which she used outflow and which she returned with   the stenosis that was treated empirically with a Covered stent because of the   risk of rupture.    This is another 6 month f/u.  No issues with the AVG.  Good flow with dialysis, no high venous pressures, no prolonged bleeding.    PHYSICAL EXAMINATION:  VITAL SIGNS:  See nursing note.  EXTREMITIES:  Left arm shows a good thrill with minimal pulsatility in the graft. stable There is no erythema, swelling, fluctuance, purulence, or drainage of any kind. There is no skin thinning or ulceration.    IMAGING:  Duplex of the graft shows it to be patent with minimal stenosis - Slightly elevated PSV at arterial anastomosis 471 (prior 613 (484); volume flow 2.5 (prior 2.8 L (3.1 (2.9).     ASSESSMENT:  Well-functioning left upper arm AV graft with Viabahn stent graft outflow treatment      PLAN: Follow up 12 months with AVF duplex if clinically indicated      MANPREET Muñoz III, MD, FACS  Professor and Chief, Vascular and Endovascular Surgery

## 2019-04-12 ENCOUNTER — TELEPHONE (OUTPATIENT)
Dept: HEMATOLOGY/ONCOLOGY | Facility: CLINIC | Age: 54
End: 2019-04-12

## 2019-04-12 NOTE — TELEPHONE ENCOUNTER
tried reaching out to pt again, but no answer,a detail message was left on v/m to contact office for scheduling.

## 2019-04-23 ENCOUNTER — TELEPHONE (OUTPATIENT)
Dept: HEMATOLOGY/ONCOLOGY | Facility: CLINIC | Age: 54
End: 2019-04-23

## 2019-04-23 NOTE — TELEPHONE ENCOUNTER
tried reaching out to pt in regards to rescheduling missed appointments, but no answer, a detail message was left on v/m.

## 2019-04-24 ENCOUNTER — TELEPHONE (OUTPATIENT)
Dept: HEMATOLOGY/ONCOLOGY | Facility: CLINIC | Age: 54
End: 2019-04-24

## 2019-04-24 NOTE — TELEPHONE ENCOUNTER
----- Message from Judie Brian sent at 4/24/2019 12:27 PM CDT -----  Contact: pt   Pt called to speak with nurse about MRI Appt have some question   Callback#[phone]  Thank You   HAROLDO Brian

## 2019-05-03 ENCOUNTER — HOSPITAL ENCOUNTER (OUTPATIENT)
Dept: RADIOLOGY | Facility: HOSPITAL | Age: 54
Discharge: HOME | End: 2019-05-03
Attending: NURSE PRACTITIONER
Payer: COMMERCIAL

## 2019-05-03 ENCOUNTER — HOSPITAL ENCOUNTER (OUTPATIENT)
Dept: RADIOLOGY | Facility: HOSPITAL | Age: 54
Discharge: HOME | End: 2019-05-03
Attending: NURSE PRACTITIONER
Payer: MEDICARE

## 2019-05-03 DIAGNOSIS — Z99.2 END STAGE RENAL FAILURE ON DIALYSIS: ICD-10-CM

## 2019-05-03 DIAGNOSIS — C64.9 METASTATIC RENAL CELL CARCINOMA TO INTRA-ABDOMINAL SITE: ICD-10-CM

## 2019-05-03 DIAGNOSIS — N18.6 END STAGE RENAL FAILURE ON DIALYSIS: ICD-10-CM

## 2019-05-03 DIAGNOSIS — C64.2 METASTATIC RENAL CELL CARCINOMA, LEFT: ICD-10-CM

## 2019-05-03 DIAGNOSIS — C79.89 METASTATIC RENAL CELL CARCINOMA TO INTRA-ABDOMINAL SITE: ICD-10-CM

## 2019-05-03 PROCEDURE — 74181 MRI ABDOMEN WITHOUT CONTRAST: ICD-10-PCS | Mod: 26,,, | Performed by: RADIOLOGY

## 2019-05-03 PROCEDURE — 71250 CT CHEST WITHOUT CONTRAST: ICD-10-PCS | Mod: 26,,, | Performed by: RADIOLOGY

## 2019-05-03 PROCEDURE — 71250 CT THORAX DX C-: CPT | Mod: 26,,, | Performed by: RADIOLOGY

## 2019-05-03 PROCEDURE — 74181 MRI ABDOMEN W/O CONTRAST: CPT | Mod: 26,,, | Performed by: RADIOLOGY

## 2019-05-03 PROCEDURE — 71250 CT THORAX DX C-: CPT | Mod: TC

## 2019-05-03 PROCEDURE — 74181 MRI ABDOMEN W/O CONTRAST: CPT | Mod: TC

## 2019-05-09 NOTE — PROGRESS NOTES
"Subjective:       Patient ID: Eva Bloom is a 53 y.o. female.    Chief Complaint: RCC    HPI     53 y.o.female to clinic for 8 week follow up and to review labs/scans for recurrent metastatic renal cell carcinoma. Pt is currently taking cabozantinib daily. Pt is on ESRD with HD every TTS. Patient tolerating treat extremely well. She reports feeling well.     Today, she denies any mouth sores, nausea, vomiting, diarrhea, constipation,weight loss, chest pain, leg swelling, headache, dizziness, or mood changes.    ECOG 1. She is accompanied alone.    Oncologic History (From Chart and Patient):  Eva Bloom is a 52 y.o. female with ESRD on HD who was found to have two L renal masses. She underwent L lap nephrectomy on 1/12/16. 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 Margins were negative.  Shehealed from surgery well, but then developed a large ovarian mass.  This was removed by gyn along with multiple sites of metastatic disease in the pelvis.  Path was c/w recurrence of RCC.     11/20/16 Pelvic ultrasound reveals "Large heterogeneous cystic and solid mass which appears to arise from the right ovary with worrisome imaging features for malignancy.  Differential diagnosis includes malignant tumors such as serous or mucinous cystadenocarcinoma.  It is important to note that the patient is at risk for ovarian torsion due to the size of the mass.Multiple uterine fibroids are identified with the largest in the uterine fundus."    11/22/16 pathology reveals "FINAL PATHOLOGIC DIAGNOSIS-RIGHT OVARY AND FALLOPIAN TUBE, RIGHT SALPINGO-OOPHORECTOMY:  -Positive for malignancy, high grade carcinoma morphologically and immunohistochemically consistent with metastasis from the patient's known renal cell carcinoma"    1/23/17 MRI abdomen reveals "Status post left nephrectomy, the left nephrectomy bed " "appears normal.There is a small liver lesion and a small splenic lesion, they cannot be fully characterized but they are unchanged from 5/27/16 suggestive of benign lesions.Multiple right kidney cysts.Cholelithiasis.Soft tissue oval density noted within the mesentery adjacent to the aorta right iliac bifurcation, nonspecific, could represent an enlarged lymph node, it is unchanged from prior studies"    1/23/17- CT Chest reveals "Stable pulmonary nodules, likely benign. No new pulmonary parenchymal abnormalities are identified. Enlarging nodular foci in partially visualized left upper abdomen. Peritoneal implants not excluded. Multiple thyroid nodules, measuring up to 1.6 cm."    4/5/17-MRI abdomen reveals "Again seen are the T2 hyperintense hepatic and splenic lesions that appear similar to prior exam and possibly represents hemangiomas. There are right renal cysts and the patient is status post left nephrectomy."    7/3/17- MRI abdomen "Left nephrectomy.  Right renal cysts with no malignant features.Liver and splenic lesions consistent with cysts versus hemangiomas.Cholelithiasis.Right pleural effusion and right basal atelectasis.Small pericardial effusion.Body wall edema and mesenteric edema."  7/3/17- CT chest "In this patient with a history of left renal cell carcinoma, there are 2 right lung nodules which are stable compared to January 2017.  2 left lung nodules seen on that study are not well-seen today, may be related to a small amount of pleural fluid.Bilateral pleural effusions, mildly decreased compared to prior study.Small amount of pericardial fluid, new."    Review of Systems   Constitutional: Negative for activity change, appetite change, chills, fatigue and fever.   HENT: Positive for congestion, postnasal drip and sore throat. Negative for hearing loss, mouth sores, tinnitus and voice change.    Eyes: Negative for pain and visual disturbance.   Respiratory: Positive for cough. Negative for shortness " of breath and wheezing.    Cardiovascular: Negative for chest pain, palpitations and leg swelling.   Gastrointestinal: Negative for abdominal pain, constipation, diarrhea, nausea and vomiting.   Endocrine: Negative for cold intolerance and heat intolerance.   Genitourinary: Negative for difficulty urinating, dyspareunia, dysuria, frequency, menstrual problem, urgency, vaginal bleeding, vaginal discharge and vaginal pain.   Musculoskeletal: Negative for arthralgias and myalgias.   Skin: Negative for color change, rash and wound.   Allergic/Immunologic: Negative for environmental allergies and food allergies.   Neurological: Negative for weakness, numbness and headaches.   Hematological: Negative for adenopathy. Does not bruise/bleed easily.   Psychiatric/Behavioral: Negative for agitation, confusion, hallucinations and sleep disturbance. The patient is not nervous/anxious.    All other systems reviewed and are negative.      Allergies:  Review of patient's allergies indicates:   Allergen Reactions    Allopurinol      Other reaction(s): abnormal transaminases       Medications:  Current Outpatient Medications   Medication Sig Dispense Refill    acetaminophen (TYLENOL) 500 MG tablet Take 500 mg by mouth every 6 (six) hours as needed for Pain.      aluminum-magnesium hydroxide-simethicone (MAALOX) 200-200-20 mg/5 mL Susp Take 30 mLs by mouth every 6 (six) hours as needed.       amLODIPine (NORVASC) 5 MG tablet Take 1 tablet (5 mg total) by mouth once daily. 90 tablet 3    cabozantinib (CABOMETYX) 60 mg Tab Take 60 mg by mouth once daily. 30 tablet 11    cloNIDine 0.2 mg/24 hr td ptwk (CATAPRES) 0.2 mg/24 hr Place 1 patch onto the skin every 7 days. 12 patch 3    febuxostat (ULORIC) 40 mg Tab Take 1 tablet (40 mg total) by mouth once daily. 30 tablet 4    hydrALAZINE (APRESOLINE) 100 MG tablet Take 1 tablet (100 mg total) by mouth every 12 (twelve) hours. 180 tablet 3    isosorbide mononitrate (IMDUR) 30 MG 24  hr tablet Take 1 tablet (30 mg total) by mouth once daily. 90 tablet 3    lidocaine-prilocaine (EMLA) cream APPLY ATLEAST 30 MINUTES BEFORE TREATMENT 3 TIMES A WEEK 30 g 11    metoprolol succinate (TOPROL-XL) 50 MG 24 hr tablet Take 1 tablet (50 mg total) by mouth once daily. 90 tablet 3    multivitamin-Ca-iron-minerals (ONE-A-DAY WOMENS FORMULA) 27-0.4 mg Tab Take by mouth. 1 Tablet Oral Every day      olmesartan (BENICAR) 40 MG tablet Take 1 tablet (40 mg total) by mouth once daily. 90 tablet 3    ondansetron (ZOFRAN-ODT) 8 MG TbDL Take 1 tablet (8 mg total) by mouth every 8 (eight) hours as needed. 30 tablet 1    psyllium husk-calcium (METAMUCIL PLUS CALCIUM) 1-60 gram-mg Cap Take by mouth. 2 Capsule Oral Every morning      sevelamer HCl (RENAGEL) 800 MG Tab Take 2 tablets (1,600 mg total) by mouth 3 (three) times daily with meals. 600 tablet 3     No current facility-administered medications for this visit.        PMH:  Past Medical History:   Diagnosis Date    Anemia     Bronchitis 03/01/2017    Cancer 2016    kidney cancer    CMV (cytomegalovirus) antibody positive     Essential hypertension 9/23/2015    H/O herpes simplex type 2 infection     Herpes simplex type 1 antibody positive     History of kidney cancer     s/p left nephrectomy 1/2016    Hyperparathyroidism, unspecified     Hyperuricemia without signs of inflammatory arthritis and tophaceous disease     Kidney stones     LGSIL (low grade squamous intraepithelial dysplasia)     Prediabetes     Proteinuria     Renal disorder     Thyroid nodule     Urate nephropathy        PSH:  Past Surgical History:   Procedure Laterality Date    BREAST CYST EXCISION      COLONOSCOPY N/A 11/12/2015    Performed by [facility]inner [facility] ENDO (4TH FLR)    INSERTION-CATHETER-HEMODIALYSIS Right 1/12/2016    Performed by Joshua Goldberg, MD at [facility] OR 2ND FLR    RUIKJVDKN-QIQAL-SVYNEXATCGNCZ LEFT UPPER EXTREMITY Left 1/12/2017    Performed  by FARIDEH Muñoz III, MD at [facility] OR 2ND FLR    INSERTION-PERITONEAL DIALYSIS CATHETER-LAPAROSCOPIC N/A 2/26/2016    Performed by Vaughn Baron MD at [facility] OR 2ND FLR    NEPHRECTOMY-LAPAROSCOPIC Left 01/12/2016    NEPHRECTOMY-LAPAROSCOPIC Left 1/12/2016    Performed by Otis Anderson MD at [facility] OR 2ND FLR    PERITONEAL CATHETER INSERTION      Permacath insertion  01/12/2017    PERMCATH REMOVAL-TUNNELED CVC REMOVAL N/A 4/1/2016    Performed by Figueroa Dsouza MD at [facility] CATH LAB    REMOVAL-CATHETER-PERITONEAL DIALYSIS N/A 1/12/2017    Performed by Vaughn Baron MD at [facility] OR 2ND FLR    ROBOT ASSISTED LAPAROSCOPIC SALPINGO-OOPHERECTOMY Right 11/22/2016    Per[facility]son Jr[facility] at [facility] OR    SALPINGOOPHORECTOMY Right 2016    KJB---DAVINCI    TONSILLECTOMY      TUBAL LIGATION         FamHx:  Family History   Problem Relation Age of Onset    Hypertension Mother     Diabetes Father     Kidney disease Father     No Known Problems Son     No Known Problems Son     Diabetes Maternal Grandfather     No Known Problems Sister     No Known Problems Brother     No Known Problems Maternal Grandmother     No Known Problems Paternal Grandmother     No Known Problems Paternal Grandfather     Heart disease Sister     No Known Problems Sister     No Known Problems Brother     No Known Problems Maternal Aunt     No Known Problems Maternal Uncle     No Known Problems Paternal Aunt     No Known Problems Paternal Uncle     Breast cancer Neg Hx     Colon cancer Neg Hx     Cancer Neg Hx     Stroke Neg Hx     Amblyopia Neg Hx     Blindness Neg Hx     Cataracts Neg Hx     Glaucoma Neg Hx     Macular degeneration Neg Hx     Retinal detachment Neg Hx     Strabismus Neg Hx     Thyroid disease Neg Hx        SocHx:  Social History     Socioeconomic History    Marital status:      Spouse name: Not on file    Number of children: 2    Years of education: Not on  file    Highest education level: Not on file   Occupational History    Occupation: lucero     Employer: [id number]   Social Needs    Financial resource strain: Not on file    Food insecurity:     Worry: Not on file     Inability: Not on file    Transportation needs:     Medical: Not on file     Non-medical: Not on file   Tobacco Use    Smoking status: Never Smoker    Smokeless tobacco: Never Used   Substance and Sexual Activity    Alcohol use: No     Comment: . 2 children. works at [employer].    Drug use: No    Sexual activity: Yes     Partners: Male   Lifestyle    Physical activity:     Days per week: Not on file     Minutes per session: Not on file    Stress: Not on file   Relationships    Social connections:     Talks on phone: Not on file     Gets together: Not on file     Attends Scientologist service: Not on file     Active member of club or organization: Not on file     Attends meetings of clubs or organizations: Not on file     Relationship status: Not on file   Other Topics Concern    Not on file   Social History Narrative    Not on file         Objective:       There were no vitals filed for this visit.    Physical Exam   Constitutional: She is oriented to person, place, and time. She appears well-developed and well-nourished. No distress.   HENT:   Head: Normocephalic and atraumatic.   Nose: Nose normal.   Mouth/Throat: Oropharynx is clear and moist. No oropharyngeal exudate.   Eyes: Pupils are equal, round, and reactive to light. Conjunctivae and EOM are normal. Right eye exhibits no discharge. Left eye exhibits no discharge.   Neck: Normal range of motion. Neck supple. No tracheal deviation present.   Cardiovascular: Normal rate and regular rhythm. Exam reveals no gallop and no friction rub.   Murmur heard.  Pulmonary/Chest: Effort normal and breath sounds normal. No respiratory distress. She has no wheezes. She has no rales. No breast tenderness, discharge or bleeding.   +wet  sounding cough but lungs CTA   Abdominal: Soft. Bowel sounds are normal. She exhibits no distension. There is no tenderness. There is no rebound and no guarding.   Musculoskeletal: Normal range of motion. She exhibits no edema or tenderness.   Neurological: She is alert and oriented to person, place, and time. She has normal reflexes. No cranial nerve deficit. Coordination normal.   Skin: Skin is warm and dry. No rash noted. She is not diaphoretic. No erythema. No pallor.   Psychiatric: She has a normal mood and affect. Her behavior is normal. Thought content normal.   Nursing note and vitals reviewed.      LABS:  WBC   Date Value Ref Range Status   02/25/2019 5.74 3.90 - 12.70 K/uL Final     Hemoglobin   Date Value Ref Range Status   02/25/2019 11.6 (L) 12.0 - 16.0 g/dL Final     Hematocrit   Date Value Ref Range Status   02/25/2019 37.1 37.0 - 48.5 % Final     Platelets   Date Value Ref Range Status   02/25/2019 227 150 - 350 K/uL Final       Chemistry        Component Value Date/Time     02/25/2019 1026    K 5.0 02/25/2019 1026     02/25/2019 1026    CO2 24 02/25/2019 1026    BUN 95 (H) 02/25/2019 1026    CREATININE 10.8 (H) 02/25/2019 1026     (H) 02/25/2019 1026        Component Value Date/Time    CALCIUM 9.4 02/25/2019 1026    ALKPHOS 147 (H) 02/25/2019 1026    AST 31 02/25/2019 1026    ALT 20 02/25/2019 1026    BILITOT 0.7 02/25/2019 1026            Assessment:       1. Metastatic renal cell carcinoma, left    2. Metastatic renal cell carcinoma to intra-abdominal site    3. End stage renal failure on dialysis    4. S/p nephrectomy left    5. Anemia due to end stage renal disease    6. Combined systolic and diastolic cardiac dysfunction    7. Hypertension, renal disease          Plan:       1. Metastatic Renal Cell Carcinoma:    - It is unlikely that surgery removed all sites of metastatic disease. Given that this is c/w her previous papillary disease, pt started on oral cabozantinib 60mg  daily (for dual VEGF and MET inhibition), especially given recent data that shows cabo is far superior to student in the first line setting.  If pt progresses on this regimen, will switch to nivolumab.     Restaging scans show stable disease and pt tolerating cabozantinib very well. We will continue cabozantinib for now. Patient has minimal side effects from medication. We will plan for scans q4 mos (next due mid Sept).      RTC in 8 weeks with labs (CBC,CMP) and to see Liset or Dr. Rivera.     The patient indicates understanding of these issues and agrees with the plan.    More than 25 mins were spent during this encounter, greater than 50% was spent in direct counseling and/or coordination of care.     Ben Rivera M.D., M.S., F.A.C.P.  Hematology and Oncology Attending  [facility]

## 2019-05-10 ENCOUNTER — OFFICE VISIT (OUTPATIENT)
Dept: HEMATOLOGY/ONCOLOGY | Facility: CLINIC | Age: 54
End: 2019-05-10
Payer: COMMERCIAL

## 2019-05-10 ENCOUNTER — LAB VISIT (OUTPATIENT)
Dept: LAB | Facility: HOSPITAL | Age: 54
End: 2019-05-10
Attending: INTERNAL MEDICINE
Payer: MEDICARE

## 2019-05-10 VITALS
OXYGEN SATURATION: 98 % | HEIGHT: 64 IN | TEMPERATURE: 98 F | DIASTOLIC BLOOD PRESSURE: 83 MMHG | RESPIRATION RATE: 16 BRPM | SYSTOLIC BLOOD PRESSURE: 172 MMHG | HEART RATE: 78 BPM | BODY MASS INDEX: 21.86 KG/M2 | WEIGHT: 128.06 LBS

## 2019-05-10 DIAGNOSIS — C79.89 METASTATIC RENAL CELL CARCINOMA TO INTRA-ABDOMINAL SITE: ICD-10-CM

## 2019-05-10 DIAGNOSIS — C64.9 METASTATIC RENAL CELL CARCINOMA TO INTRA-ABDOMINAL SITE: ICD-10-CM

## 2019-05-10 DIAGNOSIS — C64.2 METASTATIC RENAL CELL CARCINOMA, LEFT: Primary | ICD-10-CM

## 2019-05-10 DIAGNOSIS — I51.89 COMBINED SYSTOLIC AND DIASTOLIC CARDIAC DYSFUNCTION: ICD-10-CM

## 2019-05-10 DIAGNOSIS — N18.6 ANEMIA DUE TO END STAGE RENAL DISEASE: ICD-10-CM

## 2019-05-10 DIAGNOSIS — Z99.2 END STAGE RENAL FAILURE ON DIALYSIS: ICD-10-CM

## 2019-05-10 DIAGNOSIS — Z90.5 S/P NEPHRECTOMY: ICD-10-CM

## 2019-05-10 DIAGNOSIS — N18.6 END STAGE RENAL FAILURE ON DIALYSIS: ICD-10-CM

## 2019-05-10 DIAGNOSIS — I12.9 HYPERTENSION, RENAL DISEASE: ICD-10-CM

## 2019-05-10 DIAGNOSIS — D63.1 ANEMIA DUE TO END STAGE RENAL DISEASE: ICD-10-CM

## 2019-05-10 DIAGNOSIS — C64.2 METASTATIC RENAL CELL CARCINOMA, LEFT: ICD-10-CM

## 2019-05-10 LAB
ALBUMIN SERPL BCP-MCNC: 3 G/DL (ref 3.5–5.2)
ALP SERPL-CCNC: 162 U/L (ref 55–135)
ALT SERPL W/O P-5'-P-CCNC: 24 U/L (ref 10–44)
ANION GAP SERPL CALC-SCNC: 11 MMOL/L (ref 8–16)
AST SERPL-CCNC: 36 U/L (ref 10–40)
BILIRUB SERPL-MCNC: 0.7 MG/DL (ref 0.1–1)
BUN SERPL-MCNC: 57 MG/DL (ref 6–20)
CALCIUM SERPL-MCNC: 8.6 MG/DL (ref 8.7–10.5)
CHLORIDE SERPL-SCNC: 104 MMOL/L (ref 95–110)
CO2 SERPL-SCNC: 25 MMOL/L (ref 23–29)
CREAT SERPL-MCNC: 10 MG/DL (ref 0.5–1.4)
ERYTHROCYTE [DISTWIDTH] IN BLOOD BY AUTOMATED COUNT: 14.6 % (ref 11.5–14.5)
EST. GFR  (AFRICAN AMERICAN): 4.6 ML/MIN/1.73 M^2
EST. GFR  (NON AFRICAN AMERICAN): 4 ML/MIN/1.73 M^2
GLUCOSE SERPL-MCNC: 117 MG/DL (ref 70–110)
HCT VFR BLD AUTO: 33.7 % (ref 37–48.5)
HGB BLD-MCNC: 10.5 G/DL (ref 12–16)
IMM GRANULOCYTES # BLD AUTO: 0.03 K/UL (ref 0–0.04)
MCH RBC QN AUTO: 34 PG (ref 27–31)
MCHC RBC AUTO-ENTMCNC: 31.2 G/DL (ref 32–36)
MCV RBC AUTO: 109 FL (ref 82–98)
NEUTROPHILS # BLD AUTO: 2.7 K/UL (ref 1.8–7.7)
PLATELET # BLD AUTO: 239 K/UL (ref 150–350)
PMV BLD AUTO: 9.9 FL (ref 9.2–12.9)
POTASSIUM SERPL-SCNC: 4.7 MMOL/L (ref 3.5–5.1)
PROT SERPL-MCNC: 7 G/DL (ref 6–8.4)
RBC # BLD AUTO: 3.09 M/UL (ref 4–5.4)
SODIUM SERPL-SCNC: 140 MMOL/L (ref 136–145)
WBC # BLD AUTO: 5.44 K/UL (ref 3.9–12.7)

## 2019-05-10 PROCEDURE — 99214 OFFICE O/P EST MOD 30 MIN: CPT | Mod: S$PBB,,, | Performed by: INTERNAL MEDICINE

## 2019-05-10 PROCEDURE — 85027 COMPLETE CBC AUTOMATED: CPT

## 2019-05-10 PROCEDURE — 80053 COMPREHEN METABOLIC PANEL: CPT

## 2019-05-10 PROCEDURE — 36415 COLL VENOUS BLD VENIPUNCTURE: CPT

## 2019-05-10 PROCEDURE — 99214 PR OFFICE/OUTPT VISIT, EST, LEVL IV, 30-39 MIN: ICD-10-PCS | Mod: S$PBB,,, | Performed by: INTERNAL MEDICINE

## 2019-05-10 PROCEDURE — 99999 PR PBB SHADOW E&M-EST. PATIENT-LVL IV: CPT | Mod: PBBFAC,,, | Performed by: INTERNAL MEDICINE

## 2019-05-10 PROCEDURE — 99214 OFFICE O/P EST MOD 30 MIN: CPT | Mod: PBBFAC | Performed by: INTERNAL MEDICINE

## 2019-05-10 PROCEDURE — 99999 PR PBB SHADOW E&M-EST. PATIENT-LVL IV: ICD-10-PCS | Mod: PBBFAC,,, | Performed by: INTERNAL MEDICINE

## 2019-07-01 ENCOUNTER — TELEPHONE (OUTPATIENT)
Dept: HEMATOLOGY/ONCOLOGY | Facility: CLINIC | Age: 54
End: 2019-07-01

## 2019-07-10 ENCOUNTER — TELEPHONE (OUTPATIENT)
Dept: HEMATOLOGY/ONCOLOGY | Facility: CLINIC | Age: 54
End: 2019-07-10

## 2019-07-10 NOTE — TELEPHONE ENCOUNTER
spoke with pt on today in regards to rescheduling due to weather, pt is aware of new appointments.

## 2019-07-10 NOTE — TELEPHONE ENCOUNTER
----- Message from Jessica Jerez sent at 7/10/2019  8:05 AM CDT -----  Contact: self  Pt is calling in regards to rescheduling her appt on 07/12. Pt would like a call back to do so.    She can be reached at [phone] or [phone].     Thank you

## 2019-07-18 ENCOUNTER — LAB VISIT (OUTPATIENT)
Dept: LAB | Facility: HOSPITAL | Age: 54
End: 2019-07-18
Attending: INTERNAL MEDICINE
Payer: MEDICARE

## 2019-07-18 ENCOUNTER — OFFICE VISIT (OUTPATIENT)
Dept: HEMATOLOGY/ONCOLOGY | Facility: CLINIC | Age: 54
End: 2019-07-18
Payer: COMMERCIAL

## 2019-07-18 VITALS
OXYGEN SATURATION: 98 % | HEIGHT: 64 IN | HEART RATE: 71 BPM | DIASTOLIC BLOOD PRESSURE: 75 MMHG | WEIGHT: 126.13 LBS | SYSTOLIC BLOOD PRESSURE: 152 MMHG | RESPIRATION RATE: 16 BRPM | BODY MASS INDEX: 21.53 KG/M2 | TEMPERATURE: 98 F

## 2019-07-18 DIAGNOSIS — C64.2 METASTATIC RENAL CELL CARCINOMA, LEFT: Primary | ICD-10-CM

## 2019-07-18 DIAGNOSIS — C79.89 METASTATIC RENAL CELL CARCINOMA TO INTRA-ABDOMINAL SITE: ICD-10-CM

## 2019-07-18 DIAGNOSIS — C64.9 METASTATIC RENAL CELL CARCINOMA TO INTRA-ABDOMINAL SITE: ICD-10-CM

## 2019-07-18 DIAGNOSIS — Z90.5 S/P NEPHRECTOMY: ICD-10-CM

## 2019-07-18 DIAGNOSIS — Z99.2 END STAGE RENAL FAILURE ON DIALYSIS: ICD-10-CM

## 2019-07-18 DIAGNOSIS — C64.2 METASTATIC RENAL CELL CARCINOMA, LEFT: ICD-10-CM

## 2019-07-18 DIAGNOSIS — N18.6 END STAGE RENAL FAILURE ON DIALYSIS: ICD-10-CM

## 2019-07-18 LAB
ALBUMIN SERPL BCP-MCNC: 3.1 G/DL (ref 3.5–5.2)
ALP SERPL-CCNC: 160 U/L (ref 55–135)
ALT SERPL W/O P-5'-P-CCNC: 28 U/L (ref 10–44)
ANION GAP SERPL CALC-SCNC: 9 MMOL/L (ref 8–16)
AST SERPL-CCNC: 38 U/L (ref 10–40)
BILIRUB SERPL-MCNC: 0.7 MG/DL (ref 0.1–1)
BUN SERPL-MCNC: 31 MG/DL (ref 6–20)
CALCIUM SERPL-MCNC: 9.1 MG/DL (ref 8.7–10.5)
CHLORIDE SERPL-SCNC: 101 MMOL/L (ref 95–110)
CO2 SERPL-SCNC: 30 MMOL/L (ref 23–29)
CREAT SERPL-MCNC: 6.7 MG/DL (ref 0.5–1.4)
ERYTHROCYTE [DISTWIDTH] IN BLOOD BY AUTOMATED COUNT: 14.2 % (ref 11.5–14.5)
EST. GFR  (AFRICAN AMERICAN): 7.4 ML/MIN/1.73 M^2
EST. GFR  (NON AFRICAN AMERICAN): 6.4 ML/MIN/1.73 M^2
GLUCOSE SERPL-MCNC: 110 MG/DL (ref 70–110)
HCT VFR BLD AUTO: 34 % (ref 37–48.5)
HGB BLD-MCNC: 10.7 G/DL (ref 12–16)
IMM GRANULOCYTES # BLD AUTO: 0.01 K/UL (ref 0–0.04)
MCH RBC QN AUTO: 34.4 PG (ref 27–31)
MCHC RBC AUTO-ENTMCNC: 31.5 G/DL (ref 32–36)
MCV RBC AUTO: 109 FL (ref 82–98)
NEUTROPHILS # BLD AUTO: 2.9 K/UL (ref 1.8–7.7)
PLATELET # BLD AUTO: 186 K/UL (ref 150–350)
PMV BLD AUTO: 10.5 FL (ref 9.2–12.9)
POTASSIUM SERPL-SCNC: 4 MMOL/L (ref 3.5–5.1)
PROT SERPL-MCNC: 6.8 G/DL (ref 6–8.4)
RBC # BLD AUTO: 3.11 M/UL (ref 4–5.4)
SODIUM SERPL-SCNC: 140 MMOL/L (ref 136–145)
WBC # BLD AUTO: 4.67 K/UL (ref 3.9–12.7)

## 2019-07-18 PROCEDURE — 85027 COMPLETE CBC AUTOMATED: CPT

## 2019-07-18 PROCEDURE — 99999 PR PBB SHADOW E&M-EST. PATIENT-LVL IV: CPT | Mod: PBBFAC,,, | Performed by: NURSE PRACTITIONER

## 2019-07-18 PROCEDURE — 80053 COMPREHEN METABOLIC PANEL: CPT

## 2019-07-18 PROCEDURE — 99214 PR OFFICE/OUTPT VISIT, EST, LEVL IV, 30-39 MIN: ICD-10-PCS | Mod: S$GLB,,, | Performed by: NURSE PRACTITIONER

## 2019-07-18 PROCEDURE — 99214 OFFICE O/P EST MOD 30 MIN: CPT | Mod: S$GLB,,, | Performed by: NURSE PRACTITIONER

## 2019-07-18 PROCEDURE — 36415 COLL VENOUS BLD VENIPUNCTURE: CPT

## 2019-07-18 PROCEDURE — 99999 PR PBB SHADOW E&M-EST. PATIENT-LVL IV: ICD-10-PCS | Mod: PBBFAC,,, | Performed by: NURSE PRACTITIONER

## 2019-07-18 PROCEDURE — 3008F BODY MASS INDEX DOCD: CPT | Mod: CPTII,S$GLB,, | Performed by: NURSE PRACTITIONER

## 2019-07-18 PROCEDURE — 3008F PR BODY MASS INDEX (BMI) DOCUMENTED: ICD-10-PCS | Mod: CPTII,S$GLB,, | Performed by: NURSE PRACTITIONER

## 2019-07-18 NOTE — PROGRESS NOTES
"Subjective:       Patient ID: Eva Bloom is a 53 y.o. female.    Chief Complaint: RCC    HPI     53 y.o.female to clinic for 8 week follow up and to review labs for recurrent metastatic renal cell carcinoma. Pt is currently taking cabozantinib daily since December 2016. Pt is on ESRD with HD every TTS. Patient tolerating treat extremely well with little to no side effects.    Today, she denies any mouth sores, nausea, vomiting, diarrhea, constipation,weight loss, chest pain, leg swelling, headache, dizziness, or mood changes.    ECOG 1. She is accompanied alone.    Oncologic History (From Chart and Patient):  Eva Bloom is a 53 y.o.female with ESRD on HD who was found to have two L renal masses. She underwent L lap nephrectomy on 1/12/16. 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 Margins were negative.  Shehealed from surgery well, but then developed a large ovarian mass.  This was removed by gyn along with multiple sites of metastatic disease in the pelvis.  Path was c/w recurrence of RCC.     11/20/16 Pelvic ultrasound reveals "Large heterogeneous cystic and solid mass which appears to arise from the right ovary with worrisome imaging features for malignancy.  Differential diagnosis includes malignant tumors such as serous or mucinous cystadenocarcinoma.  It is important to note that the patient is at risk for ovarian torsion due to the size of the mass.Multiple uterine fibroids are identified with the largest in the uterine fundus."    11/22/16 pathology reveals "FINAL PATHOLOGIC DIAGNOSIS-RIGHT OVARY AND FALLOPIAN TUBE, RIGHT SALPINGO-OOPHORECTOMY:  -Positive for malignancy, high grade carcinoma morphologically and immunohistochemically consistent with metastasis from the patient's known renal cell carcinoma"    1/23/17 MRI abdomen reveals "Status post left nephrectomy, the left " "nephrectomy bed appears normal.There is a small liver lesion and a small splenic lesion, they cannot be fully characterized but they are unchanged from 5/27/16 suggestive of benign lesions.Multiple right kidney cysts.Cholelithiasis.Soft tissue oval density noted within the mesentery adjacent to the aorta right iliac bifurcation, nonspecific, could represent an enlarged lymph node, it is unchanged from prior studies"    1/23/17- CT Chest reveals "Stable pulmonary nodules, likely benign. No new pulmonary parenchymal abnormalities are identified. Enlarging nodular foci in partially visualized left upper abdomen. Peritoneal implants not excluded. Multiple thyroid nodules, measuring up to 1.6 cm."    4/5/17-MRI abdomen reveals "Again seen are the T2 hyperintense hepatic and splenic lesions that appear similar to prior exam and possibly represents hemangiomas. There are right renal cysts and the patient is status post left nephrectomy."    7/3/17- MRI abdomen "Left nephrectomy.  Right renal cysts with no malignant features.Liver and splenic lesions consistent with cysts versus hemangiomas.Cholelithiasis.Right pleural effusion and right basal atelectasis.Small pericardial effusion.Body wall edema and mesenteric edema."  7/3/17- CT chest "In this patient with a history of left renal cell carcinoma, there are 2 right lung nodules which are stable compared to January 2017.  2 left lung nodules seen on that study are not well-seen today, may be related to a small amount of pleural fluid.Bilateral pleural effusions, mildly decreased compared to prior study.Small amount of pericardial fluid, new."    Review of Systems   Constitutional: Negative for activity change, appetite change, chills, fatigue and fever.   HENT: Negative for congestion, hearing loss, mouth sores, postnasal drip, sore throat, tinnitus and voice change.    Eyes: Negative for pain and visual disturbance.   Respiratory: Negative for cough, shortness of breath " and wheezing.    Cardiovascular: Negative for chest pain, palpitations and leg swelling.   Gastrointestinal: Negative for abdominal pain, constipation, diarrhea, nausea and vomiting.   Endocrine: Negative for cold intolerance and heat intolerance.   Genitourinary: Negative for difficulty urinating, dyspareunia, dysuria, frequency, menstrual problem, urgency, vaginal bleeding, vaginal discharge and vaginal pain.   Musculoskeletal: Negative for arthralgias and myalgias.   Skin: Negative for color change, rash and wound.   Allergic/Immunologic: Negative for environmental allergies and food allergies.   Neurological: Negative for weakness, numbness and headaches.   Hematological: Negative for adenopathy. Does not bruise/bleed easily.   Psychiatric/Behavioral: Negative for agitation, confusion, hallucinations and sleep disturbance. The patient is not nervous/anxious.    All other systems reviewed and are negative.      Allergies:  Review of patient's allergies indicates:   Allergen Reactions    Allopurinol      Other reaction(s): abnormal transaminases       Medications:  Current Outpatient Medications   Medication Sig Dispense Refill    acetaminophen (TYLENOL) 500 MG tablet Take 500 mg by mouth every 6 (six) hours as needed for Pain.      amLODIPine (NORVASC) 5 MG tablet Take 1 tablet (5 mg total) by mouth once daily. 90 tablet 3    cabozantinib (CABOMETYX) 60 mg Tab Take 60 mg by mouth once daily. 30 tablet 11    cloNIDine 0.2 mg/24 hr td ptwk (CATAPRES) 0.2 mg/24 hr Place 1 patch onto the skin every 7 days. 12 patch 3    hydrALAZINE (APRESOLINE) 100 MG tablet Take 1 tablet (100 mg total) by mouth every 12 (twelve) hours. 180 tablet 3    isosorbide mononitrate (IMDUR) 30 MG 24 hr tablet Take 1 tablet (30 mg total) by mouth once daily. 90 tablet 3    metoprolol succinate (TOPROL-XL) 50 MG 24 hr tablet Take 1 tablet (50 mg total) by mouth once daily. 90 tablet 3    multivitamin-Ca-iron-minerals (ONE-A-DAY WOMENS  FORMULA) 27-0.4 mg Tab Take by mouth. 1 Tablet Oral Every day      olmesartan (BENICAR) 40 MG tablet Take 1 tablet (40 mg total) by mouth once daily. 90 tablet 3    ondansetron (ZOFRAN-ODT) 8 MG TbDL Take 1 tablet (8 mg total) by mouth every 8 (eight) hours as needed. 30 tablet 1    psyllium husk-calcium (METAMUCIL PLUS CALCIUM) 1-60 gram-mg Cap Take by mouth. 2 Capsule Oral Every morning      aluminum-magnesium hydroxide-simethicone (MAALOX) 200-200-20 mg/5 mL Susp Take 30 mLs by mouth every 6 (six) hours as needed.       febuxostat (ULORIC) 40 mg Tab Take 1 tablet (40 mg total) by mouth once daily. 30 tablet 4    lidocaine-prilocaine (EMLA) cream APPLY ATLEAST 30 MINUTES BEFORE TREATMENT 3 TIMES A WEEK 30 g 11    sevelamer HCl (RENAGEL) 800 MG Tab Take 2 tablets (1,600 mg total) by mouth 3 (three) times daily with meals. 600 tablet 3     No current facility-administered medications for this visit.        PMH:  Past Medical History:   Diagnosis Date    Anemia     Bronchitis 03/01/2017    Cancer 2016    kidney cancer    CMV (cytomegalovirus) antibody positive     Essential hypertension 9/23/2015    H/O herpes simplex type 2 infection     Herpes simplex type 1 antibody positive     History of kidney cancer     s/p left nephrectomy 1/2016    Hyperparathyroidism, unspecified     Hyperuricemia without signs of inflammatory arthritis and tophaceous disease     Kidney stones     LGSIL (low grade squamous intraepithelial dysplasia)     Prediabetes     Proteinuria     Renal disorder     Thyroid nodule     Urate nephropathy        PSH:  Past Surgical History:   Procedure Laterality Date    BREAST CYST EXCISION      COLONOSCOPY N/A 11/12/2015    Performed by Dandre Skinner MD at [facility] ENDO (4TH FLR)    INSERTION-CATHETER-HEMODIALYSIS Right 1/12/2016    Performed by Joshua Goldberg, MD at [facility] OR 2ND FLR    BUTSGSCMD-NOTPO-AJHGKENDNHYXR LEFT UPPER EXTREMITY Left 1/12/2017    Performed by FARIDEH CHEN  Toni SAM MD at [facility] OR 2ND FLR    INSERTION-PERITONEAL DIALYSIS CATHETER-LAPAROSCOPIC N/A 2/26/2016    Performed by Vaughn Baron MD at [facility] OR 2ND FLR    NEPHRECTOMY-LAPAROSCOPIC Left 01/12/2016    NEPHRECTOMY-LAPAROSCOPIC Left 1/12/2016    Performed by Otis Anderson MD at [facility] OR 2ND FLR    PERITONEAL CATHETER INSERTION      Permacath insertion  01/12/2017    PERMCATH REMOVAL-TUNNELED CVC REMOVAL N/A 4/1/2016    Performed by Figueroa Dsouza MD at [facility] CATH LAB    REMOVAL-CATHETER-PERITONEAL DIALYSIS N/A 1/12/2017    Performed by Vaughn Baron MD at [facility] OR 2ND FLR    ROBOT ASSISTED LAPAROSCOPIC SALPINGO-OOPHERECTOMY Right 11/22/2016    Performed by Miguel Sorenson Jr., [facility] at [facility] OR    SALPINGOOPHORECTOMY Right 2016    KJB---DAVINCI    TONSILLECTOMY      TUBAL LIGATION         FamHx:  Family History   Problem Relation Age of Onset    Hypertension Mother     Diabetes Father     Kidney disease Father     No Known Problems Son     No Known Problems Son     Diabetes Maternal Grandfather     No Known Problems Sister     No Known Problems Brother     No Known Problems Maternal Grandmother     No Known Problems Paternal Grandmother     No Known Problems Paternal Grandfather     Heart disease Sister     No Known Problems Sister     No Known Problems Brother     No Known Problems Maternal Aunt     No Known Problems Maternal Uncle     No Known Problems Paternal Aunt     No Known Problems Paternal Uncle     Breast cancer Neg Hx     Colon cancer Neg Hx     Cancer Neg Hx     Stroke Neg Hx     Amblyopia Neg Hx     Blindness Neg Hx     Cataracts Neg Hx     Glaucoma Neg Hx     Macular degeneration Neg Hx     Retinal detachment Neg Hx     Strabismus Neg Hx     Thyroid disease Neg Hx        SocHx:  Social History     Socioeconomic History    Marital status:      Spouse name: Not on file    Number of children: 2    Years of education: Not on file     Highest education level: Not on file   Occupational History    Occupation: lucero     Employer: [id number]   Social Needs    Financial resource strain: Not on file    Food insecurity:     Worry: Not on file     Inability: Not on file    Transportation needs:     Medical: Not on file     Non-medical: Not on file   Tobacco Use    Smoking status: Never Smoker    Smokeless tobacco: Never Used   Substance and Sexual Activity    Alcohol use: No     Comment: . 2 children. works at [employer].    Drug use: No    Sexual activity: Yes     Partners: Male   Lifestyle    Physical activity:     Days per week: Not on file     Minutes per session: Not on file    Stress: Not on file   Relationships    Social connections:     Talks on phone: Not on file     Gets together: Not on file     Attends Confucianist service: Not on file     Active member of club or organization: Not on file     Attends meetings of clubs or organizations: Not on file     Relationship status: Not on file   Other Topics Concern    Not on file   Social History Narrative    Not on file         Objective:       Vitals:    07/18/19 1605   BP: (!) 152/75   Pulse: 71   Resp: 16   Temp: 98.2 °F (36.8 °C)       Physical Exam   Constitutional: She is oriented to person, place, and time. She appears well-developed and well-nourished. No distress.   HENT:   Head: Normocephalic and atraumatic.   Nose: Nose normal.   Mouth/Throat: Oropharynx is clear and moist. No oropharyngeal exudate.   Eyes: Pupils are equal, round, and reactive to light. Conjunctivae and EOM are normal. Right eye exhibits no discharge. Left eye exhibits no discharge.   Neck: Normal range of motion. Neck supple. No tracheal deviation present.   Cardiovascular: Normal rate and regular rhythm. Exam reveals no gallop and no friction rub.   Murmur heard.  Pulmonary/Chest: Effort normal and breath sounds normal. No respiratory distress. She has no wheezes. She has no rales. No breast  tenderness, discharge or bleeding.   Abdominal: Soft. Bowel sounds are normal. She exhibits no distension. There is no tenderness. There is no rebound and no guarding.   Musculoskeletal: Normal range of motion. She exhibits no edema or tenderness.   Neurological: She is alert and oriented to person, place, and time. She has normal reflexes. No cranial nerve deficit. Coordination normal.   Skin: Skin is warm and dry. No rash noted. She is not diaphoretic. No erythema. No pallor.   Psychiatric: She has a normal mood and affect. Her behavior is normal. Thought content normal.   Nursing note and vitals reviewed.      LABS:  WBC   Date Value Ref Range Status   07/18/2019 4.67 3.90 - 12.70 K/uL Final     Hemoglobin   Date Value Ref Range Status   07/18/2019 10.7 (L) 12.0 - 16.0 g/dL Final     Hematocrit   Date Value Ref Range Status   07/18/2019 34.0 (L) 37.0 - 48.5 % Final     Platelets   Date Value Ref Range Status   07/18/2019 186 150 - 350 K/uL Final       Chemistry        Component Value Date/Time     07/18/2019 1502    K 4.0 07/18/2019 1502     07/18/2019 1502    CO2 30 (H) 07/18/2019 1502    BUN 31 (H) 07/18/2019 1502    CREATININE 6.7 (H) 07/18/2019 1502     07/18/2019 1502        Component Value Date/Time    CALCIUM 9.1 07/18/2019 1502    ALKPHOS 160 (H) 07/18/2019 1502    AST 38 07/18/2019 1502    ALT 28 07/18/2019 1502    BILITOT 0.7 07/18/2019 1502            Assessment:       1. Metastatic renal cell carcinoma, left    2. Metastatic renal cell carcinoma to intra-abdominal site    3. End stage renal failure on dialysis    4. S/p nephrectomy left          Plan:       1,2,3,4 Metastatic Renal Cell Carcinoma:    - It is unlikely that surgery removed all sites of metastatic disease. Given that this is c/w her previous papillary disease, pt started on oral cabozantinib 60mg daily (for dual VEGF and MET inhibition), especially given recent data that shows cabo is far superior to student in the  first line setting.  If pt progresses on this regimen, will switch to nivolumab.     Restaging scans show stable disease and pt tolerating cabozantinib very well. We will continue cabozantinib for now. Patient has minimal side effects from medication. We will plan for scans q4 mos (next due mid Sept).      RTC in 8 weeks with MRI abdomen, CT chest, labs (CBC,CMP) and to see Dr. Rivera.     Collaborating physician is Dr. Rivera. Patient is in agreement with the proposed treatment plan. All questions were answered to the patient's satisfaction. Pt knows to call clinic if anything is needed before the next clinic visit.    TONA Telles-RODRÍGUEZ  Hematology and Medical Oncology

## 2019-07-30 RX ORDER — CLONIDINE 0.2 MG/24H
1 PATCH, EXTENDED RELEASE TRANSDERMAL
Qty: 12 PATCH | Refills: 3 | Status: SHIPPED | OUTPATIENT
Start: 2019-07-30 | End: 2020-12-18 | Stop reason: SDUPTHER

## 2019-09-03 DIAGNOSIS — I10 ESSENTIAL HYPERTENSION: ICD-10-CM

## 2019-09-04 ENCOUNTER — TELEPHONE (OUTPATIENT)
Dept: FAMILY MEDICINE | Facility: CLINIC | Age: 54
End: 2019-09-04

## 2019-09-04 DIAGNOSIS — Z12.31 ENCOUNTER FOR SCREENING MAMMOGRAM FOR MALIGNANT NEOPLASM OF BREAST: Primary | ICD-10-CM

## 2019-09-04 RX ORDER — AMLODIPINE BESYLATE 5 MG/1
TABLET ORAL
Qty: 90 TABLET | Refills: 3 | Status: SHIPPED | OUTPATIENT
Start: 2019-09-04 | End: 2021-04-09

## 2019-09-04 NOTE — TELEPHONE ENCOUNTER
----- Message from Aida Branden sent at 9/4/2019  9:27 AM CDT -----  Contact: Self [phone]  Type:  Mammogram    Caller is requesting to schedule their annual mammogram appointment.  Order is not listed in EPIC.  Please enter order and contact patient to schedule.  Name of Caller: Self    Where would they like the mammogram performed? [place]    Would the patient rather a call back or a response via [facility]? Call back    Best Call Back Number: [phone]

## 2019-09-06 ENCOUNTER — TELEPHONE (OUTPATIENT)
Dept: HEMATOLOGY/ONCOLOGY | Facility: CLINIC | Age: 54
End: 2019-09-06

## 2019-09-06 NOTE — TELEPHONE ENCOUNTER
spoke with pt on today in regards to upcoming appointments, pt is aware of changes and has confirm.

## 2019-09-12 DIAGNOSIS — I12.9 HYPERTENSIVE KIDNEY DISEASE WITH CHRONIC KIDNEY DISEASE, STAGE 1-4 OR UNSPECIFIED CHRONIC KIDNEY DISEASE: ICD-10-CM

## 2019-09-12 DIAGNOSIS — C64.2 METASTATIC RENAL CELL CARCINOMA, LEFT: ICD-10-CM

## 2019-09-12 DIAGNOSIS — N18.6 ANEMIA IN ESRD (END-STAGE RENAL DISEASE): ICD-10-CM

## 2019-09-12 DIAGNOSIS — D63.1 ANEMIA IN ESRD (END-STAGE RENAL DISEASE): ICD-10-CM

## 2019-09-12 DIAGNOSIS — Z90.5 S/P NEPHRECTOMY: ICD-10-CM

## 2019-09-12 DIAGNOSIS — Z99.2 ESRD ON HEMODIALYSIS: ICD-10-CM

## 2019-09-12 DIAGNOSIS — Z51.11 ENCOUNTER FOR CHEMOTHERAPY MANAGEMENT: ICD-10-CM

## 2019-09-12 DIAGNOSIS — Z90.721 HISTORY OF RIGHT OOPHORECTOMY: ICD-10-CM

## 2019-09-12 DIAGNOSIS — C78.6 MALIGNANT NEOPLASM METASTATIC TO PERITONEUM: ICD-10-CM

## 2019-09-12 DIAGNOSIS — N18.6 ESRD ON HEMODIALYSIS: ICD-10-CM

## 2019-09-13 ENCOUNTER — HOSPITAL ENCOUNTER (OUTPATIENT)
Dept: RADIOLOGY | Facility: HOSPITAL | Age: 54
Discharge: HOME | End: 2019-09-13
Attending: NURSE PRACTITIONER
Payer: MEDICARE

## 2019-09-13 DIAGNOSIS — C64.2 METASTATIC RENAL CELL CARCINOMA, LEFT: ICD-10-CM

## 2019-09-13 PROCEDURE — 74181 MRI ABDOMEN WITHOUT CONTRAST: ICD-10-PCS | Mod: 26,,, | Performed by: RADIOLOGY

## 2019-09-13 PROCEDURE — 71250 CT THORAX DX C-: CPT | Mod: 26,,, | Performed by: RADIOLOGY

## 2019-09-13 PROCEDURE — 74181 MRI ABDOMEN W/O CONTRAST: CPT | Mod: 26,,, | Performed by: RADIOLOGY

## 2019-09-13 PROCEDURE — 71250 CT THORAX DX C-: CPT | Mod: TC

## 2019-09-13 PROCEDURE — 71250 CT CHEST WITHOUT CONTRAST: ICD-10-PCS | Mod: 26,,, | Performed by: RADIOLOGY

## 2019-09-13 PROCEDURE — 74181 MRI ABDOMEN W/O CONTRAST: CPT | Mod: TC

## 2019-09-13 NOTE — PROGRESS NOTES
"Subjective:       Patient ID: Eva Bloom is a 53 y.o. female.    Chief Complaint: RCC    HPI     53 y.o.female to clinic for 8 week follow up and to review labs for recurrent metastatic renal cell carcinoma. Pt is currently taking cabozantinib daily since December 2016. Pt is on ESRD with HD every TTS. Patient tolerating treat extremely well with little to no side effects. Notices intermittent nausea that is relieved by Zofran.    Today, she denies any mouth sores, vomiting, diarrhea, constipation,weight loss, chest pain, leg swelling, headache, dizziness, or mood changes.    ECOG 1. She is accompanied alone.    Oncologic History (From Chart and Patient):  Eva Bloom is a 53 y.o.female with ESRD on HD who was found to have two L renal masses. She underwent L lap nephrectomy on 1/12/16. 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 Margins were negative.  Shehealed from surgery well, but then developed a large ovarian mass.  This was removed by gyn along with multiple sites of metastatic disease in the pelvis.  Path was c/w recurrence of RCC.     11/20/16 Pelvic ultrasound reveals "Large heterogeneous cystic and solid mass which appears to arise from the right ovary with worrisome imaging features for malignancy.  Differential diagnosis includes malignant tumors such as serous or mucinous cystadenocarcinoma.  It is important to note that the patient is at risk for ovarian torsion due to the size of the mass.Multiple uterine fibroids are identified with the largest in the uterine fundus."    11/22/16 pathology reveals "FINAL PATHOLOGIC DIAGNOSIS-RIGHT OVARY AND FALLOPIAN TUBE, RIGHT SALPINGO-OOPHORECTOMY:  -Positive for malignancy, high grade carcinoma morphologically and immunohistochemically consistent with metastasis from the patient's known renal cell carcinoma"    1/23/17 MRI abdomen " "reveals "Status post left nephrectomy, the left nephrectomy bed appears normal.There is a small liver lesion and a small splenic lesion, they cannot be fully characterized but they are unchanged from 5/27/16 suggestive of benign lesions.Multiple right kidney cysts.Cholelithiasis.Soft tissue oval density noted within the mesentery adjacent to the aorta right iliac bifurcation, nonspecific, could represent an enlarged lymph node, it is unchanged from prior studies"    1/23/17- CT Chest reveals "Stable pulmonary nodules, likely benign. No new pulmonary parenchymal abnormalities are identified. Enlarging nodular foci in partially visualized left upper abdomen. Peritoneal implants not excluded. Multiple thyroid nodules, measuring up to 1.6 cm."    4/5/17-MRI abdomen reveals "Again seen are the T2 hyperintense hepatic and splenic lesions that appear similar to prior exam and possibly represents hemangiomas. There are right renal cysts and the patient is status post left nephrectomy."    7/3/17- MRI abdomen "Left nephrectomy.  Right renal cysts with no malignant features.Liver and splenic lesions consistent with cysts versus hemangiomas.Cholelithiasis.Right pleural effusion and right basal atelectasis.Small pericardial effusion.Body wall edema and mesenteric edema."  7/3/17- CT chest "In this patient with a history of left renal cell carcinoma, there are 2 right lung nodules which are stable compared to January 2017.  2 left lung nodules seen on that study are not well-seen today, may be related to a small amount of pleural fluid.Bilateral pleural effusions, mildly decreased compared to prior study.Small amount of pericardial fluid, new."    Review of Systems   Constitutional: Negative for activity change, appetite change, chills, fatigue and fever.   HENT: Negative for congestion, hearing loss, mouth sores, postnasal drip, sore throat, tinnitus and voice change.    Eyes: Negative for pain and visual disturbance. "   Respiratory: Negative for cough, shortness of breath and wheezing.    Cardiovascular: Negative for chest pain, palpitations and leg swelling.   Gastrointestinal: Positive for nausea. Negative for abdominal pain, constipation, diarrhea and vomiting.   Endocrine: Negative for cold intolerance and heat intolerance.   Genitourinary: Negative for difficulty urinating, dyspareunia, dysuria, frequency, menstrual problem, urgency, vaginal bleeding, vaginal discharge and vaginal pain.   Musculoskeletal: Negative for arthralgias and myalgias.   Skin: Negative for color change, rash and wound.   Allergic/Immunologic: Negative for environmental allergies and food allergies.   Neurological: Negative for weakness, numbness and headaches.   Hematological: Negative for adenopathy. Does not bruise/bleed easily.   Psychiatric/Behavioral: Negative for agitation, confusion, hallucinations and sleep disturbance. The patient is not nervous/anxious.    All other systems reviewed and are negative.      Allergies:  Review of patient's allergies indicates:   Allergen Reactions    Allopurinol      Other reaction(s): abnormal transaminases       Medications:  Current Outpatient Medications   Medication Sig Dispense Refill    acetaminophen (TYLENOL) 500 MG tablet Take 500 mg by mouth every 6 (six) hours as needed for Pain.      aluminum-magnesium hydroxide-simethicone (MAALOX) 200-200-20 mg/5 mL Susp Take 30 mLs by mouth every 6 (six) hours as needed.       amLODIPine (NORVASC) 5 MG tablet TAKE 1 TABLET BY MOUTH EVERY DAY 90 tablet 3    cabozantinib (CABOMETYX) 60 mg Tab Take 60 mg by mouth once daily. 30 tablet 11    cloNIDine 0.2 mg/24 hr td ptwk (CATAPRES) 0.2 mg/24 hr PLACE 1 PATCH ONTO THE SKIN EVERY 7 DAYS. 12 patch 3    hydrALAZINE (APRESOLINE) 100 MG tablet Take 1 tablet (100 mg total) by mouth every 12 (twelve) hours. 180 tablet 3    lidocaine-prilocaine (EMLA) cream APPLY ATLEAST 30 MINUTES BEFORE TREATMENT 3 TIMES A WEEK 30  g 11    metoprolol succinate (TOPROL-XL) 50 MG 24 hr tablet Take 1 tablet (50 mg total) by mouth once daily. 90 tablet 3    multivitamin-Ca-iron-minerals (ONE-A-DAY WOMENS FORMULA) 27-0.4 mg Tab Take by mouth. 1 Tablet Oral Every day      olmesartan (BENICAR) 40 MG tablet Take 1 tablet (40 mg total) by mouth once daily. 90 tablet 3    ondansetron (ZOFRAN-ODT) 8 MG TbDL Take 1 tablet (8 mg total) by mouth every 8 (eight) hours as needed. 30 tablet 1    psyllium husk-calcium (METAMUCIL PLUS CALCIUM) 1-60 gram-mg Cap Take by mouth. 2 Capsule Oral Every morning      febuxostat (ULORIC) 40 mg Tab Take 1 tablet (40 mg total) by mouth once daily. 30 tablet 4    isosorbide mononitrate (IMDUR) 30 MG 24 hr tablet Take 1 tablet (30 mg total) by mouth once daily. 90 tablet 3    sevelamer HCl (RENAGEL) 800 MG Tab Take 2 tablets (1,600 mg total) by mouth 3 (three) times daily with meals. 600 tablet 3     No current facility-administered medications for this visit.        PMH:  Past Medical History:   Diagnosis Date    Anemia     Bronchitis 03/01/2017    Cancer 2016    kidney cancer    CMV (cytomegalovirus) antibody positive     Essential hypertension 9/23/2015    H/O herpes simplex type 2 infection     Herpes simplex type 1 antibody positive     History of kidney cancer     s/p left nephrectomy 1/2016    Hyperparathyroidism, unspecified     Hyperuricemia without signs of inflammatory arthritis and tophaceous disease     Kidney stones     LGSIL (low grade squamous intraepithelial dysplasia)     Prediabetes     Proteinuria     Renal disorder     Thyroid nodule     Urate nephropathy        PSH:  Past Surgical History:   Procedure Laterality Date    BREAST CYST EXCISION      COLONOSCOPY N/A 11/12/2015    Performed by [facility]re Skinner [facility] at [facility] END[facility] (4TH FLR)    INSERTION-CATHETER-HEMODIALYSIS Right 1/12/2016    Performed by Joshua Goldberg, MD at [facility] OR 2ND FLR    [id number]-[id number]-NCANGAKYYCCXO LEFT  UPPER EXTREMITY Left 1/12/2017    Performed by FARIDEH Muñoz III, MD at [facility] OR 2ND FLR    INSERTION-PERITONEAL DIALYSIS CATHETER-LAPAROSCOPIC N/A 2/26/2016    Performed by Vaughn Baron MD at [facility] OR 2ND FLR    NEPHRECTOMY-LAPAROSCOPIC Left 01/12/2016    NEPHRECTOMY-LAPAROSCOPIC Left 1/12/2016    Performed by tOis Anderson MD at [facility] OR 2ND FLR    PERITONEAL CATHETER INSERTION      Permacath insertion  01/12/2017    PERMCATH REMOVAL-TUNNELED CVC REMOVAL N/A 4/1/2016    Performed by Figueroa Dsouza MD at [facility] CATH LAB    REMOVAL-CATHETER-PERITONEAL DIALYSIS N/A 1/12/2017    Performed by Vaughn Baron MD at [facility] OR 2ND FLR    ROBOT ASSISTED LAPAROSCOPIC SALPINGO-OOPHERECTOMY Right 11/22/2016    Per[facility] Miguel [facility]orenson Jr[facility] at [facility] OR    SALPINGOOPHORECTOMY Right 2016    KJB---DAVINCI    TONSILLECTOMY      TUBAL LIGATION         FamHx:  Family History   Problem Relation Age of Onset    Hypertension Mother     Diabetes Father     Kidney disease Father     No Known Problems Son     No Known Problems Son     Diabetes Maternal Grandfather     No Known Problems Sister     No Known Problems Brother     No Known Problems Maternal Grandmother     No Known Problems Paternal Grandmother     No Known Problems Paternal Grandfather     Heart disease Sister     No Known Problems Sister     No Known Problems Brother     No Known Problems Maternal Aunt     No Known Problems Maternal Uncle     No Known Problems Paternal Aunt     No Known Problems Paternal Uncle     Breast cancer Neg Hx     Colon cancer Neg Hx     Cancer Neg Hx     Stroke Neg Hx     Amblyopia Neg Hx     Blindness Neg Hx     Cataracts Neg Hx     Glaucoma Neg Hx     Macular degeneration Neg Hx     Retinal detachment Neg Hx     Strabismus Neg Hx     Thyroid disease Neg Hx        SocHx:  Social History     Socioeconomic History    Marital status:      Spouse name: Not on file    Number of  children: 2    Years of education: Not on file    Highest education level: Not on file   Occupational History    Occupation: [employer]     Employer: [id number]   Social Needs    Financial resource strain: Not on file    Food insecurity:     Worry: Not on file     Inability: Not on file    Transportation needs:     Medical: Not on file     Non-medical: Not on file   Tobacco Use    Smoking status: Never Smoker    Smokeless tobacco: Never Used   Substance and Sexual Activity    Alcohol use: No     Comment: . 2 children. works at [employer].    Drug use: No    Sexual activity: Yes     Partners: Male   Lifestyle    Physical activity:     Days per week: Not on file     Minutes per session: Not on file    Stress: Not on file   Relationships    Social connections:     Talks on phone: Not on file     Gets together: Not on file     Attends Buddhism service: Not on file     Active member of club or organization: Not on file     Attends meetings of clubs or organizations: Not on file     Relationship status: Not on file   Other Topics Concern    Not on file   Social History Narrative    Not on file         Objective:       Vitals:    09/16/19 1544   BP: (!) 185/81   Pulse: 78   Temp: 97.8 °F (36.6 °C)       Physical Exam   Constitutional: She is oriented to person, place, and time. She appears well-developed and well-nourished. No distress.   HENT:   Head: Normocephalic and atraumatic.   Nose: Nose normal.   Mouth/Throat: Oropharynx is clear and moist. No oropharyngeal exudate.   Eyes: Pupils are equal, round, and reactive to light. Conjunctivae and EOM are normal. Right eye exhibits no discharge. Left eye exhibits no discharge.   Neck: Normal range of motion. Neck supple. No tracheal deviation present.   Cardiovascular: Normal rate and regular rhythm. Exam reveals no gallop and no friction rub.   Murmur heard.  Pulmonary/Chest: Effort normal and breath sounds normal. No respiratory distress. She has  no wheezes. She has no rales. No breast tenderness, discharge or bleeding.   Abdominal: Soft. Bowel sounds are normal. She exhibits no distension. There is no tenderness. There is no rebound and no guarding.   Musculoskeletal: Normal range of motion. She exhibits no edema or tenderness.   Neurological: She is alert and oriented to person, place, and time. She has normal reflexes. No cranial nerve deficit. Coordination normal.   Skin: Skin is warm and dry. No rash noted. She is not diaphoretic. No erythema. No pallor.   Psychiatric: She has a normal mood and affect. Her behavior is normal. Thought content normal.   Nursing note and vitals reviewed.      LABS:  WBC   Date Value Ref Range Status   09/16/2019 5.66 3.90 - 12.70 K/uL Final     Hemoglobin   Date Value Ref Range Status   09/16/2019 8.2 (L) 12.0 - 16.0 g/dL Final     Hematocrit   Date Value Ref Range Status   09/16/2019 27.1 (L) 37.0 - 48.5 % Final     Platelets   Date Value Ref Range Status   09/16/2019 175 150 - 350 K/uL Final       Chemistry        Component Value Date/Time     09/16/2019 1501    K 4.9 09/16/2019 1501    CL 99 09/16/2019 1501    CO2 28 09/16/2019 1501    BUN 99 (H) 09/16/2019 1501    CREATININE 11.8 (H) 09/16/2019 1501     09/16/2019 1501        Component Value Date/Time    CALCIUM 9.7 09/16/2019 1501    ALKPHOS 118 09/16/2019 1501    AST 39 09/16/2019 1501    ALT 32 09/16/2019 1501    BILITOT 0.7 09/16/2019 1501            Assessment:       1. Metastatic renal cell carcinoma, left    2. Metastatic renal cell carcinoma to intra-abdominal site    3. End stage renal failure on dialysis    4. S/p nephrectomy left    5. Anemia due to end stage renal disease    6. Combined systolic and diastolic cardiac dysfunction    7. Hypertension, renal disease    8. Chemotherapy induced nausea and vomiting          Plan:       1,2,3,4,5- Metastatic Renal Cell Carcinoma:    - It is unlikely that surgery removed all sites of metastatic disease.  Given that this is c/w her previous papillary disease, pt started on oral cabozantinib 60mg daily (for dual VEGF and MET inhibition), especially given recent data that shows cabo is far superior to student in the first line setting.  If pt progresses on this regimen, will switch to nivolumab.     Restaging scans show stable disease and pt tolerating cabozantinib very well. We will continue cabozantinib for now. Patient has minimal side effects from medication. We will plan for scans q4 mos (next due mid Jan).      RTC 8 weeks with labs (CBC,CMP) and to see me.    6,7- Per cards.    8- Zofran refilled.    RTC in 8 weeks with labs (CBC,CMP) and to see me.    Patient was also seen and examined by Dr. Rivera. Patient is in agreement with the proposed treatment plan. All questions were answered to the patient's satisfaction. Pt knows to call clinic if anything is needed before the next clinic visit.    Liset Ngo, TONA-RODRÍGUEZ  Hematology and Medical Oncology    I have reviewed the notes, assessments, and/or procedures performed by the nurse practitioner, as above.  I have personally interviewed the patient at the beside, and rounded with the nurse practitioner. I formulated the plan of care.  I concur with her documentation of Eva Bloom.  I, Dr. Ben Rivera, personally spent more than 25 mins during this encounter, greater than 50% was spent in direct counseling and/or coordination of care.     Ben Rivera M.D., M.S., F.A.C.P.  Hematology/Oncology Attending  [facility]

## 2019-09-16 ENCOUNTER — OFFICE VISIT (OUTPATIENT)
Dept: HEMATOLOGY/ONCOLOGY | Facility: CLINIC | Age: 54
End: 2019-09-16
Payer: MEDICARE

## 2019-09-16 ENCOUNTER — LAB VISIT (OUTPATIENT)
Dept: LAB | Facility: HOSPITAL | Age: 54
End: 2019-09-16
Payer: MEDICARE

## 2019-09-16 VITALS
WEIGHT: 132.5 LBS | DIASTOLIC BLOOD PRESSURE: 81 MMHG | SYSTOLIC BLOOD PRESSURE: 185 MMHG | HEART RATE: 78 BPM | TEMPERATURE: 98 F | BODY MASS INDEX: 22.62 KG/M2 | HEIGHT: 64 IN

## 2019-09-16 DIAGNOSIS — N18.6 END STAGE RENAL FAILURE ON DIALYSIS: ICD-10-CM

## 2019-09-16 DIAGNOSIS — N18.6 ANEMIA DUE TO END STAGE RENAL DISEASE: ICD-10-CM

## 2019-09-16 DIAGNOSIS — Z90.5 S/P NEPHRECTOMY: ICD-10-CM

## 2019-09-16 DIAGNOSIS — C79.89 METASTATIC RENAL CELL CARCINOMA TO INTRA-ABDOMINAL SITE: ICD-10-CM

## 2019-09-16 DIAGNOSIS — I12.9 HYPERTENSION, RENAL DISEASE: ICD-10-CM

## 2019-09-16 DIAGNOSIS — D63.1 ANEMIA DUE TO END STAGE RENAL DISEASE: ICD-10-CM

## 2019-09-16 DIAGNOSIS — I51.89 COMBINED SYSTOLIC AND DIASTOLIC CARDIAC DYSFUNCTION: ICD-10-CM

## 2019-09-16 DIAGNOSIS — R11.2 CHEMOTHERAPY INDUCED NAUSEA AND VOMITING: ICD-10-CM

## 2019-09-16 DIAGNOSIS — C64.9 METASTATIC RENAL CELL CARCINOMA TO INTRA-ABDOMINAL SITE: ICD-10-CM

## 2019-09-16 DIAGNOSIS — T45.1X5A CHEMOTHERAPY INDUCED NAUSEA AND VOMITING: ICD-10-CM

## 2019-09-16 DIAGNOSIS — C64.2 METASTATIC RENAL CELL CARCINOMA, LEFT: ICD-10-CM

## 2019-09-16 DIAGNOSIS — C64.2 METASTATIC RENAL CELL CARCINOMA, LEFT: Primary | ICD-10-CM

## 2019-09-16 DIAGNOSIS — Z99.2 END STAGE RENAL FAILURE ON DIALYSIS: ICD-10-CM

## 2019-09-16 LAB
ALBUMIN SERPL BCP-MCNC: 3.1 G/DL (ref 3.5–5.2)
ALP SERPL-CCNC: 118 U/L (ref 55–135)
ALT SERPL W/O P-5'-P-CCNC: 32 U/L (ref 10–44)
ANION GAP SERPL CALC-SCNC: 14 MMOL/L (ref 8–16)
AST SERPL-CCNC: 39 U/L (ref 10–40)
BILIRUB SERPL-MCNC: 0.7 MG/DL (ref 0.1–1)
BUN SERPL-MCNC: 99 MG/DL (ref 6–20)
CALCIUM SERPL-MCNC: 9.7 MG/DL (ref 8.7–10.5)
CHLORIDE SERPL-SCNC: 99 MMOL/L (ref 95–110)
CO2 SERPL-SCNC: 28 MMOL/L (ref 23–29)
CREAT SERPL-MCNC: 11.8 MG/DL (ref 0.5–1.4)
ERYTHROCYTE [DISTWIDTH] IN BLOOD BY AUTOMATED COUNT: 15.4 % (ref 11.5–14.5)
EST. GFR  (AFRICAN AMERICAN): 3.7 ML/MIN/1.73 M^2
EST. GFR  (NON AFRICAN AMERICAN): 3.2 ML/MIN/1.73 M^2
GLUCOSE SERPL-MCNC: 107 MG/DL (ref 70–110)
HCT VFR BLD AUTO: 27.1 % (ref 37–48.5)
HGB BLD-MCNC: 8.2 G/DL (ref 12–16)
IMM GRANULOCYTES # BLD AUTO: 0.03 K/UL (ref 0–0.04)
MCH RBC QN AUTO: 33.3 PG (ref 27–31)
MCHC RBC AUTO-ENTMCNC: 30.3 G/DL (ref 32–36)
MCV RBC AUTO: 110 FL (ref 82–98)
NEUTROPHILS # BLD AUTO: 2.4 K/UL (ref 1.8–7.7)
PLATELET # BLD AUTO: 175 K/UL (ref 150–350)
PMV BLD AUTO: 9.5 FL (ref 9.2–12.9)
POTASSIUM SERPL-SCNC: 4.9 MMOL/L (ref 3.5–5.1)
PROT SERPL-MCNC: 6.7 G/DL (ref 6–8.4)
RBC # BLD AUTO: 2.46 M/UL (ref 4–5.4)
SODIUM SERPL-SCNC: 141 MMOL/L (ref 136–145)
WBC # BLD AUTO: 5.66 K/UL (ref 3.9–12.7)

## 2019-09-16 PROCEDURE — 99213 OFFICE O/P EST LOW 20 MIN: CPT | Mod: PBBFAC | Performed by: INTERNAL MEDICINE

## 2019-09-16 PROCEDURE — 85027 COMPLETE CBC AUTOMATED: CPT

## 2019-09-16 PROCEDURE — 99214 OFFICE O/P EST MOD 30 MIN: CPT | Mod: S$GLB,,, | Performed by: INTERNAL MEDICINE

## 2019-09-16 PROCEDURE — 99999 PR PBB SHADOW E&M-EST. PATIENT-LVL III: ICD-10-PCS | Mod: PBBFAC,,, | Performed by: INTERNAL MEDICINE

## 2019-09-16 PROCEDURE — 80053 COMPREHEN METABOLIC PANEL: CPT

## 2019-09-16 PROCEDURE — 99999 PR PBB SHADOW E&M-EST. PATIENT-LVL III: CPT | Mod: PBBFAC,,, | Performed by: INTERNAL MEDICINE

## 2019-09-16 PROCEDURE — 99214 PR OFFICE/OUTPT VISIT, EST, LEVL IV, 30-39 MIN: ICD-10-PCS | Mod: S$GLB,,, | Performed by: INTERNAL MEDICINE

## 2019-09-16 PROCEDURE — 36415 COLL VENOUS BLD VENIPUNCTURE: CPT

## 2019-09-16 RX ORDER — ONDANSETRON 8 MG/1
8 TABLET, ORALLY DISINTEGRATING ORAL EVERY 8 HOURS PRN
Qty: 30 TABLET | Refills: 1 | Status: SHIPPED | OUTPATIENT
Start: 2019-09-16 | End: 2020-09-15

## 2019-10-10 ENCOUNTER — PATIENT OUTREACH (OUTPATIENT)
Dept: ADMINISTRATIVE | Facility: OTHER | Age: 54
End: 2019-10-10

## 2019-10-14 ENCOUNTER — OFFICE VISIT (OUTPATIENT)
Dept: OPTOMETRY | Facility: CLINIC | Age: 54
End: 2019-10-14
Payer: MEDICARE

## 2019-10-14 DIAGNOSIS — I10 ESSENTIAL HYPERTENSION: Primary | ICD-10-CM

## 2019-10-14 PROCEDURE — 99211 OFF/OP EST MAY X REQ PHY/QHP: CPT | Mod: PBBFAC,PO | Performed by: OPTOMETRIST

## 2019-10-14 PROCEDURE — 99999 PR PBB SHADOW E&M-EST. PATIENT-LVL I: CPT | Mod: PBBFAC,,, | Performed by: OPTOMETRIST

## 2019-10-14 PROCEDURE — 99999 PR PBB SHADOW E&M-EST. PATIENT-LVL I: ICD-10-PCS | Mod: PBBFAC,,, | Performed by: OPTOMETRIST

## 2019-10-14 PROCEDURE — 92014 COMPRE OPH EXAM EST PT 1/>: CPT | Mod: S$PBB,,, | Performed by: OPTOMETRIST

## 2019-10-14 PROCEDURE — 92014 PR EYE EXAM, EST PATIENT,COMPREHESV: ICD-10-PCS | Mod: S$PBB,,, | Performed by: OPTOMETRIST

## 2019-10-14 NOTE — PROGRESS NOTES
Subjective:       Patient ID: Eva Bloom is a 54 y.o. female      Chief Complaint   Patient presents with    Concerns About Ocular Health    Hypertensive Eye Exam     History of Present Illness  Dls: 10/12/18 Dr. Larsen     53 y/o female presents today for hypertensive eye exam.   Pt states no changes in vision.   Pt wears single vision glasses for reading.    No tearing  No itching  No burning  No pain  No ha's  No floaters  No flashes    Eye meds  None     Assessment/Plan:     1. Essential hypertension  No hypertensive retinopathy. Continue BP control. RTC 1 year for DFE.    Follow up in about 1 year (around 10/14/2020).

## 2019-10-18 DIAGNOSIS — Z99.2 END STAGE RENAL FAILURE ON DIALYSIS: Primary | ICD-10-CM

## 2019-10-18 DIAGNOSIS — N18.6 END STAGE RENAL FAILURE ON DIALYSIS: Primary | ICD-10-CM

## 2019-10-23 ENCOUNTER — HOSPITAL ENCOUNTER (OUTPATIENT)
Dept: RADIOLOGY | Facility: HOSPITAL | Age: 54
Discharge: HOME | End: 2019-10-23
Attending: INTERNAL MEDICINE
Payer: COMMERCIAL

## 2019-10-23 VITALS — BODY MASS INDEX: 22.53 KG/M2 | HEIGHT: 64 IN | WEIGHT: 132 LBS

## 2019-10-23 DIAGNOSIS — I10 ESSENTIAL HYPERTENSION: ICD-10-CM

## 2019-10-23 DIAGNOSIS — I12.9 HYPERTENSIVE KIDNEY DISEASE WITH CHRONIC KIDNEY DISEASE, STAGE 1-4 OR UNSPECIFIED CHRONIC KIDNEY DISEASE: ICD-10-CM

## 2019-10-23 DIAGNOSIS — Z90.5 S/P NEPHRECTOMY: ICD-10-CM

## 2019-10-23 DIAGNOSIS — Z12.31 ENCOUNTER FOR SCREENING MAMMOGRAM FOR MALIGNANT NEOPLASM OF BREAST: ICD-10-CM

## 2019-10-23 DIAGNOSIS — Z99.2 ESRD ON HEMODIALYSIS: ICD-10-CM

## 2019-10-23 DIAGNOSIS — N18.6 ANEMIA IN ESRD (END-STAGE RENAL DISEASE): ICD-10-CM

## 2019-10-23 DIAGNOSIS — C64.2 METASTATIC RENAL CELL CARCINOMA, LEFT: ICD-10-CM

## 2019-10-23 DIAGNOSIS — R93.1 ABNORMAL ECHOCARDIOGRAM: ICD-10-CM

## 2019-10-23 DIAGNOSIS — Z51.11 ENCOUNTER FOR CHEMOTHERAPY MANAGEMENT: ICD-10-CM

## 2019-10-23 DIAGNOSIS — N18.6 ESRD ON HEMODIALYSIS: ICD-10-CM

## 2019-10-23 DIAGNOSIS — C78.6 MALIGNANT NEOPLASM METASTATIC TO PERITONEUM: ICD-10-CM

## 2019-10-23 DIAGNOSIS — E04.1 THYROID NODULE: ICD-10-CM

## 2019-10-23 DIAGNOSIS — Z90.721 HISTORY OF RIGHT OOPHORECTOMY: ICD-10-CM

## 2019-10-23 DIAGNOSIS — D63.1 ANEMIA IN ESRD (END-STAGE RENAL DISEASE): ICD-10-CM

## 2019-10-23 PROCEDURE — 77067 SCR MAMMO BI INCL CAD: CPT | Mod: 26,,, | Performed by: RADIOLOGY

## 2019-10-23 PROCEDURE — 77063 MAMMO DIGITAL SCREENING BILAT WITH TOMOSYNTHESIS_CAD: ICD-10-PCS | Mod: 26,,, | Performed by: RADIOLOGY

## 2019-10-23 PROCEDURE — 77067 MAMMO DIGITAL SCREENING BILAT WITH TOMOSYNTHESIS_CAD: ICD-10-PCS | Mod: 26,,, | Performed by: RADIOLOGY

## 2019-10-23 PROCEDURE — 77067 SCR MAMMO BI INCL CAD: CPT | Mod: TC

## 2019-10-23 PROCEDURE — 77063 BREAST TOMOSYNTHESIS BI: CPT | Mod: 26,,, | Performed by: RADIOLOGY

## 2019-10-24 DIAGNOSIS — I10 ESSENTIAL HYPERTENSION: ICD-10-CM

## 2019-10-29 ENCOUNTER — OFFICE VISIT (OUTPATIENT)
Dept: VASCULAR SURGERY | Facility: CLINIC | Age: 54
End: 2019-10-29
Attending: SURGERY
Payer: COMMERCIAL

## 2019-10-29 ENCOUNTER — HOSPITAL ENCOUNTER (OUTPATIENT)
Dept: VASCULAR SURGERY | Facility: CLINIC | Age: 54
Discharge: HOME | End: 2019-10-29
Attending: SURGERY
Payer: COMMERCIAL

## 2019-10-29 VITALS
WEIGHT: 125.69 LBS | HEART RATE: 94 BPM | BODY MASS INDEX: 21.46 KG/M2 | DIASTOLIC BLOOD PRESSURE: 88 MMHG | HEIGHT: 64 IN | TEMPERATURE: 99 F | SYSTOLIC BLOOD PRESSURE: 181 MMHG

## 2019-10-29 DIAGNOSIS — N18.6 ESRD (END STAGE RENAL DISEASE) ON DIALYSIS: Primary | ICD-10-CM

## 2019-10-29 DIAGNOSIS — N18.6 END STAGE RENAL FAILURE ON DIALYSIS: ICD-10-CM

## 2019-10-29 DIAGNOSIS — Z99.2 ESRD (END STAGE RENAL DISEASE) ON DIALYSIS: Primary | ICD-10-CM

## 2019-10-29 DIAGNOSIS — Z99.2 END STAGE RENAL FAILURE ON DIALYSIS: ICD-10-CM

## 2019-10-29 PROCEDURE — 99213 OFFICE O/P EST LOW 20 MIN: CPT | Mod: S$GLB,,, | Performed by: SURGERY

## 2019-10-29 PROCEDURE — 99999 PR PBB SHADOW E&M-EST. PATIENT-LVL III: ICD-10-PCS | Mod: PBBFAC,,, | Performed by: SURGERY

## 2019-10-29 PROCEDURE — 99213 OFFICE O/P EST LOW 20 MIN: CPT | Mod: PBBFAC,25 | Performed by: SURGERY

## 2019-10-29 PROCEDURE — 99999 PR PBB SHADOW E&M-EST. PATIENT-LVL III: CPT | Mod: PBBFAC,,, | Performed by: SURGERY

## 2019-10-29 PROCEDURE — 93990 DOPPLER FLOW TESTING: CPT | Mod: PBBFAC | Performed by: SURGERY

## 2019-10-29 PROCEDURE — 99213 PR OFFICE/OUTPT VISIT, EST, LEVL III, 20-29 MIN: ICD-10-PCS | Mod: S$GLB,,, | Performed by: SURGERY

## 2019-10-29 RX ORDER — HYDRALAZINE HYDROCHLORIDE 100 MG/1
TABLET, FILM COATED ORAL
Qty: 180 TABLET | Refills: 3 | Status: SHIPPED | OUTPATIENT
Start: 2019-10-29 | End: 2022-01-10

## 2019-10-29 RX ORDER — CEPHALEXIN 500 MG/1
500 CAPSULE ORAL EVERY 12 HOURS
Qty: 20 CAPSULE | Refills: 0 | Status: SHIPPED | OUTPATIENT
Start: 2019-10-29 | End: 2019-11-08

## 2019-10-29 RX ORDER — ISOSORBIDE MONONITRATE 30 MG/1
TABLET, EXTENDED RELEASE ORAL
Qty: 90 TABLET | Refills: 3 | Status: ON HOLD | OUTPATIENT
Start: 2019-10-29 | End: 2020-03-16 | Stop reason: SDUPTHER

## 2019-10-29 NOTE — PROGRESS NOTES
See Dr. Muñoz's prior notes, review of systems, family history and social history are   unchanged.    HISTORY OF PRESENT ILLNESS:  A 53-year-old female with end-stage renal disease,   well known to Dr. Muñoz, status post,  1.  Covered stent placement, left AV fistula outflow tract, 03/06/2017.  2.  Left upper arm 4-7 tapered AV graft on 01/12/2017.    Of note, at the time of surgery was found to have an exceptionally thin and   friable 4 mm brachial vein, which she used outflow and which she returned with   the stenosis that was treated empirically with a Covered stent because of the   risk of rupture.    I have not seen her in approximately 6 months.  She presents because of question of modest erythema around her graft.  She has not had any trouble with the graft per se    PHYSICAL EXAMINATION:  VITAL SIGNS:  See nursing note.  EXTREMITIES:  Left arm shows a good thrill with minimal pulsatility in the graft.  There is very minimal erythema along the graft site. There is no fluctuance    IMAGING:  Duplex of the graft shows it to be patent no significant stenosis.  Flow volume is 3.9 L.  no perigraft fluid is seen    ASSESSMENT:  Well-functioning left upper arm AV graft with Viabahn stent graft outflow treatment  Minimal erythema      PLAN:  Keflex 500 mg p.o. b.i.d. times 10 days  Follow up with our clinic if  minimal erythema does not resolve  Otherwise keep follow-up appointment in approximately 6 months time with AV fistula duplex if clinically indicated       MANPREET Muñoz III, MD, FACS  Professor and Chief, Vascular and Endovascular Surgery

## 2019-11-08 ENCOUNTER — TELEPHONE (OUTPATIENT)
Dept: HEMATOLOGY/ONCOLOGY | Facility: CLINIC | Age: 54
End: 2019-11-08

## 2019-11-11 ENCOUNTER — TELEPHONE (OUTPATIENT)
Dept: HEMATOLOGY/ONCOLOGY | Facility: CLINIC | Age: 54
End: 2019-11-11

## 2019-11-11 ENCOUNTER — OFFICE VISIT (OUTPATIENT)
Dept: HEMATOLOGY/ONCOLOGY | Facility: CLINIC | Age: 54
End: 2019-11-11
Payer: MEDICARE

## 2019-11-11 ENCOUNTER — PATIENT OUTREACH (OUTPATIENT)
Dept: ADMINISTRATIVE | Facility: HOSPITAL | Age: 54
End: 2019-11-11

## 2019-11-11 ENCOUNTER — LAB VISIT (OUTPATIENT)
Dept: LAB | Facility: HOSPITAL | Age: 54
End: 2019-11-11
Payer: MEDICARE

## 2019-11-11 VITALS
RESPIRATION RATE: 16 BRPM | WEIGHT: 131.38 LBS | OXYGEN SATURATION: 95 % | TEMPERATURE: 99 F | SYSTOLIC BLOOD PRESSURE: 191 MMHG | BODY MASS INDEX: 22.43 KG/M2 | DIASTOLIC BLOOD PRESSURE: 88 MMHG | HEIGHT: 64 IN | HEART RATE: 81 BPM

## 2019-11-11 DIAGNOSIS — N18.6 ANEMIA DUE TO END STAGE RENAL DISEASE: ICD-10-CM

## 2019-11-11 DIAGNOSIS — E87.5 HYPERKALEMIA: ICD-10-CM

## 2019-11-11 DIAGNOSIS — C64.9 METASTATIC RENAL CELL CARCINOMA TO INTRA-ABDOMINAL SITE: ICD-10-CM

## 2019-11-11 DIAGNOSIS — I12.9 HYPERTENSION, RENAL DISEASE: ICD-10-CM

## 2019-11-11 DIAGNOSIS — C64.2 METASTATIC RENAL CELL CARCINOMA, LEFT: Primary | ICD-10-CM

## 2019-11-11 DIAGNOSIS — D63.1 ANEMIA DUE TO END STAGE RENAL DISEASE: ICD-10-CM

## 2019-11-11 DIAGNOSIS — C64.2 METASTATIC RENAL CELL CARCINOMA, LEFT: ICD-10-CM

## 2019-11-11 DIAGNOSIS — I51.89 COMBINED SYSTOLIC AND DIASTOLIC CARDIAC DYSFUNCTION: ICD-10-CM

## 2019-11-11 DIAGNOSIS — N18.6 END STAGE RENAL FAILURE ON DIALYSIS: ICD-10-CM

## 2019-11-11 DIAGNOSIS — Z90.5 S/P NEPHRECTOMY: ICD-10-CM

## 2019-11-11 DIAGNOSIS — C79.89 METASTATIC RENAL CELL CARCINOMA TO INTRA-ABDOMINAL SITE: ICD-10-CM

## 2019-11-11 DIAGNOSIS — Z99.2 END STAGE RENAL FAILURE ON DIALYSIS: ICD-10-CM

## 2019-11-11 LAB
ALBUMIN SERPL BCP-MCNC: 3.2 G/DL (ref 3.5–5.2)
ALP SERPL-CCNC: 94 U/L (ref 55–135)
ALT SERPL W/O P-5'-P-CCNC: 32 U/L (ref 10–44)
ANION GAP SERPL CALC-SCNC: 12 MMOL/L (ref 8–16)
AST SERPL-CCNC: 71 U/L (ref 10–40)
BILIRUB SERPL-MCNC: 0.6 MG/DL (ref 0.1–1)
BUN SERPL-MCNC: 69 MG/DL (ref 6–20)
CALCIUM SERPL-MCNC: 9.6 MG/DL (ref 8.7–10.5)
CHLORIDE SERPL-SCNC: 101 MMOL/L (ref 95–110)
CO2 SERPL-SCNC: 25 MMOL/L (ref 23–29)
CREAT SERPL-MCNC: 10.7 MG/DL (ref 0.5–1.4)
ERYTHROCYTE [DISTWIDTH] IN BLOOD BY AUTOMATED COUNT: 15.1 % (ref 11.5–14.5)
EST. GFR  (AFRICAN AMERICAN): 4.2 ML/MIN/1.73 M^2
EST. GFR  (NON AFRICAN AMERICAN): 3.6 ML/MIN/1.73 M^2
GLUCOSE SERPL-MCNC: 84 MG/DL (ref 70–110)
HCT VFR BLD AUTO: 30.4 % (ref 37–48.5)
HGB BLD-MCNC: 9.5 G/DL (ref 12–16)
IMM GRANULOCYTES # BLD AUTO: 0.01 K/UL (ref 0–0.04)
MCH RBC QN AUTO: 35.8 PG (ref 27–31)
MCHC RBC AUTO-ENTMCNC: 31.3 G/DL (ref 32–36)
MCV RBC AUTO: 115 FL (ref 82–98)
NEUTROPHILS # BLD AUTO: 1.9 K/UL (ref 1.8–7.7)
PLATELET # BLD AUTO: 193 K/UL (ref 150–350)
PMV BLD AUTO: 10.8 FL (ref 9.2–12.9)
POTASSIUM SERPL-SCNC: 5.8 MMOL/L (ref 3.5–5.1)
PROT SERPL-MCNC: 7 G/DL (ref 6–8.4)
RBC # BLD AUTO: 2.65 M/UL (ref 4–5.4)
SODIUM SERPL-SCNC: 138 MMOL/L (ref 136–145)
WBC # BLD AUTO: 4.65 K/UL (ref 3.9–12.7)

## 2019-11-11 PROCEDURE — 99999 PR PBB SHADOW E&M-EST. PATIENT-LVL IV: CPT | Mod: PBBFAC,,, | Performed by: NURSE PRACTITIONER

## 2019-11-11 PROCEDURE — 99999 PR PBB SHADOW E&M-EST. PATIENT-LVL IV: ICD-10-PCS | Mod: PBBFAC,,, | Performed by: NURSE PRACTITIONER

## 2019-11-11 PROCEDURE — 36415 COLL VENOUS BLD VENIPUNCTURE: CPT

## 2019-11-11 PROCEDURE — 85027 COMPLETE CBC AUTOMATED: CPT

## 2019-11-11 PROCEDURE — 80053 COMPREHEN METABOLIC PANEL: CPT

## 2019-11-11 PROCEDURE — 99214 PR OFFICE/OUTPT VISIT, EST, LEVL IV, 30-39 MIN: ICD-10-PCS | Mod: S$PBB,,, | Performed by: NURSE PRACTITIONER

## 2019-11-11 PROCEDURE — 99214 OFFICE O/P EST MOD 30 MIN: CPT | Mod: S$PBB,,, | Performed by: NURSE PRACTITIONER

## 2019-11-11 PROCEDURE — 99214 OFFICE O/P EST MOD 30 MIN: CPT | Mod: PBBFAC | Performed by: NURSE PRACTITIONER

## 2019-11-11 NOTE — PROGRESS NOTES
"Subjective:       Patient ID: Eva Bloom is a 54 y.o. female.    Chief Complaint: RCC    HPI     54 y.o.female, patient of Dr. Rivera, to clinic for 8 week follow up and to review labs for recurrent metastatic renal cell carcinoma. Pt is currently taking cabozantinib daily since December 2016. Pt is on ESRD with HD every TTS. Patient tolerating treat extremely well with little to no side effects.    Today, she denies any mouth sores, nausea, vomiting, diarrhea, constipation,weight loss, chest pain, leg swelling, headache, dizziness, or mood changes.    ECOG 1. She is accompanied alone.    Oncologic History (From Chart and Patient):  Eva Bloom is a 54 y.o.female with ESRD on HD who was found to have two L renal masses. She underwent L lap nephrectomy on 1/12/16. 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 Margins were negative.  Shehealed from surgery well, but then developed a large ovarian mass.  This was removed by gyn along with multiple sites of metastatic disease in the pelvis.  Path was c/w recurrence of RCC.     11/20/16 Pelvic ultrasound reveals "Large heterogeneous cystic and solid mass which appears to arise from the right ovary with worrisome imaging features for malignancy.  Differential diagnosis includes malignant tumors such as serous or mucinous cystadenocarcinoma.  It is important to note that the patient is at risk for ovarian torsion due to the size of the mass.Multiple uterine fibroids are identified with the largest in the uterine fundus."    11/22/16 pathology reveals "FINAL PATHOLOGIC DIAGNOSIS-RIGHT OVARY AND FALLOPIAN TUBE, RIGHT SALPINGO-OOPHORECTOMY:  -Positive for malignancy, high grade carcinoma morphologically and immunohistochemically consistent with metastasis from the patient's known renal cell carcinoma"    1/23/17 MRI abdomen reveals "Status post " "left nephrectomy, the left nephrectomy bed appears normal.There is a small liver lesion and a small splenic lesion, they cannot be fully characterized but they are unchanged from 5/27/16 suggestive of benign lesions.Multiple right kidney cysts.Cholelithiasis.Soft tissue oval density noted within the mesentery adjacent to the aorta right iliac bifurcation, nonspecific, could represent an enlarged lymph node, it is unchanged from prior studies"    1/23/17- CT Chest reveals "Stable pulmonary nodules, likely benign. No new pulmonary parenchymal abnormalities are identified. Enlarging nodular foci in partially visualized left upper abdomen. Peritoneal implants not excluded. Multiple thyroid nodules, measuring up to 1.6 cm."    4/5/17-MRI abdomen reveals "Again seen are the T2 hyperintense hepatic and splenic lesions that appear similar to prior exam and possibly represents hemangiomas. There are right renal cysts and the patient is status post left nephrectomy."    7/3/17- MRI abdomen "Left nephrectomy.  Right renal cysts with no malignant features.Liver and splenic lesions consistent with cysts versus hemangiomas.Cholelithiasis.Right pleural effusion and right basal atelectasis.Small pericardial effusion.Body wall edema and mesenteric edema."  7/3/17- CT chest "In this patient with a history of left renal cell carcinoma, there are 2 right lung nodules which are stable compared to January 2017.  2 left lung nodules seen on that study are not well-seen today, may be related to a small amount of pleural fluid.Bilateral pleural effusions, mildly decreased compared to prior study.Small amount of pericardial fluid, new."    Review of Systems   Constitutional: Negative for activity change, appetite change, chills, fatigue and fever.   HENT: Negative for congestion, hearing loss, mouth sores, postnasal drip, sore throat, tinnitus and voice change.    Eyes: Negative for pain and visual disturbance.   Respiratory: Negative for " cough, shortness of breath and wheezing.    Cardiovascular: Negative for chest pain, palpitations and leg swelling.   Gastrointestinal: Negative for abdominal pain, constipation, diarrhea, nausea and vomiting.   Endocrine: Negative for cold intolerance and heat intolerance.   Genitourinary: Negative for difficulty urinating, dyspareunia, dysuria, frequency, menstrual problem, urgency, vaginal bleeding, vaginal discharge and vaginal pain.   Musculoskeletal: Negative for arthralgias and myalgias.   Skin: Negative for color change, rash and wound.   Allergic/Immunologic: Negative for environmental allergies and food allergies.   Neurological: Negative for weakness, numbness and headaches.   Hematological: Negative for adenopathy. Does not bruise/bleed easily.   Psychiatric/Behavioral: Negative for agitation, confusion, hallucinations and sleep disturbance. The patient is not nervous/anxious.    All other systems reviewed and are negative.      Allergies:  Review of patient's allergies indicates:   Allergen Reactions    Allopurinol      Other reaction(s): abnormal transaminases       Medications:  Current Outpatient Medications   Medication Sig Dispense Refill    acetaminophen (TYLENOL) 500 MG tablet Take 500 mg by mouth every 6 (six) hours as needed for Pain.      aluminum-magnesium hydroxide-simethicone (MAALOX) 200-200-20 mg/5 mL Susp Take 30 mLs by mouth every 6 (six) hours as needed.       amLODIPine (NORVASC) 5 MG tablet TAKE 1 TABLET BY MOUTH EVERY DAY 90 tablet 3    cabozantinib (CABOMETYX) 60 mg Tab Take 60 mg by mouth once daily. 30 tablet 11    cloNIDine 0.2 mg/24 hr td ptwk (CATAPRES) 0.2 mg/24 hr PLACE 1 PATCH ONTO THE SKIN EVERY 7 DAYS. 12 patch 3    febuxostat (ULORIC) 40 mg Tab Take 1 tablet (40 mg total) by mouth once daily. 30 tablet 4    hydrALAZINE (APRESOLINE) 100 MG tablet TAKE 1 TABLET BY MOUTH EVERY 12 HOURS 180 tablet 3    isosorbide mononitrate (IMDUR) 30 MG 24 hr tablet TAKE 1 TABLET  BY MOUTH EVERY DAY 90 tablet 3    lidocaine-prilocaine (EMLA) cream APPLY ATLEAST 30 MINUTES BEFORE TREATMENT 3 TIMES A WEEK 30 g 11    metoprolol succinate (TOPROL-XL) 50 MG 24 hr tablet Take 1 tablet (50 mg total) by mouth once daily. 90 tablet 3    multivitamin-Ca-iron-minerals (ONE-A-DAY WOMENS FORMULA) 27-0.4 mg Tab Take by mouth. 1 Tablet Oral Every day      olmesartan (BENICAR) 40 MG tablet Take 1 tablet (40 mg total) by mouth once daily. 90 tablet 3    ondansetron (ZOFRAN-ODT) 8 MG TbDL Take 1 tablet (8 mg total) by mouth every 8 (eight) hours as needed. 30 tablet 1    psyllium husk-calcium (METAMUCIL PLUS CALCIUM) 1-60 gram-mg Cap Take by mouth. 2 Capsule Oral Every morning      sevelamer HCl (RENAGEL) 800 MG Tab Take 2 tablets (1,600 mg total) by mouth 3 (three) times daily with meals. 600 tablet 3     No current facility-administered medications for this visit.        PMH:  Past Medical History:   Diagnosis Date    Anemia     Bronchitis 03/01/2017    Cancer 2016    kidney cancer    CMV (cytomegalovirus) antibody positive     Essential hypertension 9/23/2015    H/O herpes simplex type 2 infection     Herpes simplex type 1 antibody positive     History of kidney cancer     s/p left nephrectomy 1/2016    Hyperparathyroidism, unspecified     Hyperuricemia without signs of inflammatory arthritis and tophaceous disease     Kidney stones     LGSIL (low grade squamous intraepithelial dysplasia)     Prediabetes     Proteinuria     Renal disorder     Thyroid nodule     Urate nephropathy        PSH:  Past Surgical History:   Procedure Laterality Date    BREAST CYST EXCISION      COLONOSCOPY N/A 11/12/2015    NEPHRECTOMY-LAPAROSCOPIC Left 01/12/2016    PERITONEAL CATHETER INSERTION      Permacath insertion  01/12/2017    SALPINGOOPHORECTOMY Right 2016    KJB---DAVINCI    TONSILLECTOMY      TUBAL LIGATION         FamHx:  Family History   Problem Relation Age of Onset    Hypertension  Mother     Diabetes Father     Kidney disease Father     No Known Problems Son     No Known Problems Son     Diabetes Maternal Grandfather     No Known Problems Sister     No Known Problems Brother     No Known Problems Maternal Grandmother     No Known Problems Paternal Grandmother     No Known Problems Paternal Grandfather     Heart disease Sister     No Known Problems Sister     No Known Problems Brother     No Known Problems Maternal Aunt     No Known Problems Maternal Uncle     No Known Problems Paternal Aunt     No Known Problems Paternal Uncle     Breast cancer Neg Hx     Colon cancer Neg Hx     Cancer Neg Hx     Stroke Neg Hx     Amblyopia Neg Hx     Blindness Neg Hx     Cataracts Neg Hx     Glaucoma Neg Hx     Macular degeneration Neg Hx     Retinal detachment Neg Hx     Strabismus Neg Hx     Thyroid disease Neg Hx        SocHx:  Social History     Socioeconomic History    Marital status:      Spouse name: Not on file    Number of children: 2    Years of education: Not on file    Highest education level: Not on file   Occupational History    Occupation: [employer]     Employer: [id number]   Social Needs    Financial resource strain: Not on file    Food insecurity:     Worry: Not on file     Inability: Not on file    Transportation needs:     Medical: Not on file     Non-medical: Not on file   Tobacco Use    Smoking status: Never Smoker    Smokeless tobacco: Never Used   Substance and Sexual Activity    Alcohol use: No     Comment: . 2 children. works at [employer].    Drug use: No    Sexual activity: Yes     Partners: Male   Lifestyle    Physical activity:     Days per week: Not on file     Minutes per session: Not on file    Stress: Not on file   Relationships    Social connections:     Talks on phone: Not on file     Gets together: Not on file     Attends Gnosticism service: Not on file     Active member of club or organization: Not on file      Attends meetings of clubs or organizations: Not on file     Relationship status: Not on file   Other Topics Concern    Not on file   Social History Narrative    Not on file         Objective:       There were no vitals filed for this visit.    Physical Exam   Constitutional: She is oriented to person, place, and time. She appears well-developed and well-nourished. No distress.   HENT:   Head: Normocephalic and atraumatic.   Nose: Nose normal.   Mouth/Throat: Oropharynx is clear and moist. No oropharyngeal exudate.   Eyes: Pupils are equal, round, and reactive to light. Conjunctivae and EOM are normal. Right eye exhibits no discharge. Left eye exhibits no discharge.   Neck: Normal range of motion. Neck supple. No tracheal deviation present.   Cardiovascular: Normal rate and regular rhythm. Exam reveals no gallop and no friction rub.   Murmur heard.  Pulmonary/Chest: Effort normal and breath sounds normal. No respiratory distress. She has no wheezes. She has no rales. No breast tenderness, discharge or bleeding.   Abdominal: Soft. Bowel sounds are normal. She exhibits no distension. There is no tenderness. There is no rebound and no guarding.   Musculoskeletal: Normal range of motion. She exhibits no edema or tenderness.   Neurological: She is alert and oriented to person, place, and time. She has normal reflexes. No cranial nerve deficit. Coordination normal.   Skin: Skin is warm and dry. No rash noted. She is not diaphoretic. No erythema. No pallor.   Psychiatric: She has a normal mood and affect. Her behavior is normal. Thought content normal.   Nursing note and vitals reviewed.      LABS:  WBC   Date Value Ref Range Status   09/16/2019 5.66 3.90 - 12.70 K/uL Final     Hemoglobin   Date Value Ref Range Status   09/16/2019 8.2 (L) 12.0 - 16.0 g/dL Final     Hematocrit   Date Value Ref Range Status   09/16/2019 27.1 (L) 37.0 - 48.5 % Final     Platelets   Date Value Ref Range Status   09/16/2019 175 150 - 350 K/uL  Final       Chemistry        Component Value Date/Time     09/16/2019 1501    K 4.9 09/16/2019 1501    CL 99 09/16/2019 1501    CO2 28 09/16/2019 1501    BUN 99 (H) 09/16/2019 1501    CREATININE 11.8 (H) 09/16/2019 1501     09/16/2019 1501        Component Value Date/Time    CALCIUM 9.7 09/16/2019 1501    ALKPHOS 118 09/16/2019 1501    AST 39 09/16/2019 1501    ALT 32 09/16/2019 1501    BILITOT 0.7 09/16/2019 1501            Assessment:       1. Metastatic renal cell carcinoma, left    2. Metastatic renal cell carcinoma to intra-abdominal site    3. End stage renal failure on dialysis    4. S/p nephrectomy left    5. Anemia due to end stage renal disease    6. Combined systolic and diastolic cardiac dysfunction    7. Hypertension, renal disease    8. Hyperkalemia          Plan:       1,2,3,4 Metastatic Renal Cell Carcinoma:    - It is unlikely that surgery removed all sites of metastatic disease. Given that this is c/w her previous papillary disease, pt started on oral cabozantinib 60mg daily (for dual VEGF and MET inhibition), especially given recent data that shows cabo is far superior to student in the first line setting.  If pt progresses on this regimen, will switch to nivolumab.     Restaging scans show stable disease and pt tolerating cabozantinib very well. We will continue cabozantinib for now. Patient has minimal side effects from medication. We will plan for scans every 4 months.    RTC in 8 weeks with MRI abdomen, CT chest, labs (CBC,CMP) and to see Dr. Rivera.      5- Mild. Monitor.    6,7- Per cards.    8- Repeat K needed.    Patient was also seen and examined by Dr. Rivera. Patient is in agreement with the proposed treatment plan. All questions were answered to the patient's satisfaction. Pt knows to call clinic if anything is needed before the next clinic visit.    TONA Telles-RODRÍGUEZ  Hematology and Medical Oncology

## 2019-11-11 NOTE — LETTER
November 11, 2019      Sowmya Mccain MD  [address]  [place] [address]           [facility]  [facility]  [place] [address]  Phone: [phone]          Patient: Eva Bloom   MR Number: [id number]   YOB: 1965   Date of Visit: 11/11/2019       Dear Dr. Sowmya Mccain:    Thank you for referring Eva Bloom to me for evaluation. Attached you will find relevant portions of my assessment and plan of care.    If you have questions, please do not hesitate to call me. I look forward to following Eva Bloom along with you.    Sincerely,    Liset Ngo, NP    Enclosure  CC:  No Recipients    If you would like to receive this communication electronically, please contact [email or web address] or [phone] to request more information on [employer] access.    For providers and/or their staff who would like to refer a patient to [facility], please contact us through our one-stop-shop provider referral line, Miley Lomas, at [phone].    If you feel you have received this communication in error or would no longer like to receive these types of communications, please e-mail [email or web address]

## 2019-11-12 ENCOUNTER — TELEPHONE (OUTPATIENT)
Dept: HEMATOLOGY/ONCOLOGY | Facility: CLINIC | Age: 54
End: 2019-11-12

## 2019-11-12 ENCOUNTER — LAB VISIT (OUTPATIENT)
Dept: LAB | Facility: HOSPITAL | Age: 54
End: 2019-11-12
Attending: NURSE PRACTITIONER
Payer: MEDICARE

## 2019-11-12 DIAGNOSIS — C64.9 METASTATIC RENAL CELL CARCINOMA TO INTRA-ABDOMINAL SITE: ICD-10-CM

## 2019-11-12 DIAGNOSIS — C64.2 METASTATIC RENAL CELL CARCINOMA, LEFT: ICD-10-CM

## 2019-11-12 DIAGNOSIS — C79.89 METASTATIC RENAL CELL CARCINOMA TO INTRA-ABDOMINAL SITE: ICD-10-CM

## 2019-11-12 LAB
ALBUMIN SERPL BCP-MCNC: 3.5 G/DL (ref 3.5–5.2)
ALP SERPL-CCNC: 107 U/L (ref 55–135)
ALT SERPL W/O P-5'-P-CCNC: 37 U/L (ref 10–44)
ANION GAP SERPL CALC-SCNC: 10 MMOL/L (ref 8–16)
AST SERPL-CCNC: 59 U/L (ref 10–40)
BILIRUB SERPL-MCNC: 0.7 MG/DL (ref 0.1–1)
BUN SERPL-MCNC: 26 MG/DL (ref 6–20)
CALCIUM SERPL-MCNC: 9.1 MG/DL (ref 8.7–10.5)
CHLORIDE SERPL-SCNC: 100 MMOL/L (ref 95–110)
CO2 SERPL-SCNC: 29 MMOL/L (ref 23–29)
CREAT SERPL-MCNC: 5.5 MG/DL (ref 0.5–1.4)
EST. GFR  (AFRICAN AMERICAN): 9.3 ML/MIN/1.73 M^2
EST. GFR  (NON AFRICAN AMERICAN): 8.1 ML/MIN/1.73 M^2
GLUCOSE SERPL-MCNC: 104 MG/DL (ref 70–110)
POTASSIUM SERPL-SCNC: 3.7 MMOL/L (ref 3.5–5.1)
PROT SERPL-MCNC: 7.5 G/DL (ref 6–8.4)
SODIUM SERPL-SCNC: 139 MMOL/L (ref 136–145)

## 2019-11-12 PROCEDURE — 36415 COLL VENOUS BLD VENIPUNCTURE: CPT | Mod: PO

## 2019-11-12 PROCEDURE — 80053 COMPREHEN METABOLIC PANEL: CPT

## 2019-11-12 NOTE — TELEPHONE ENCOUNTER
----- Message from Judie Brian sent at 11/12/2019  8:14 AM CST -----  Contact: Pt  Type:  Patient Returning Call    Who Called:Pt   Who Left Message for Patient:Nurse Gerber  Does the patient know what this is regarding?:appt   Would the patient rather a call back or a response via [facility]? callback  Best Call Back Number:[phone]  Additional Information:   Thank You  HAROLDO Brian

## 2019-11-12 NOTE — TELEPHONE ENCOUNTER
Left message letting patient know that we need a repeat potassium, since it was elevated yesterday.

## 2019-11-18 ENCOUNTER — TELEPHONE (OUTPATIENT)
Dept: HEMATOLOGY/ONCOLOGY | Facility: CLINIC | Age: 54
End: 2019-11-18

## 2019-11-18 NOTE — TELEPHONE ENCOUNTER
----- Message from Gerber Butler RN sent at 11/18/2019 10:06 AM CST -----  Did he send you this follow up last week?   ----- Message -----  From: Liset Ngo NP  Sent: 11/11/2019   5:07 PM CST  To: Gerber Butler, RN    RTC in 8 weeks with MRI abdomen, CT chest, labs (CBC,CMP) and to see Dr. Rivera.

## 2019-11-24 PROBLEM — I42.9 MYOCARDIOPATHY: Status: RESOLVED | Noted: 2017-07-21 | Resolved: 2019-11-24

## 2019-11-24 PROBLEM — I42.8 CARDIOMYOPATHY, NONISCHEMIC: Status: ACTIVE | Noted: 2017-07-21

## 2019-11-25 ENCOUNTER — OFFICE VISIT (OUTPATIENT)
Dept: FAMILY MEDICINE | Facility: CLINIC | Age: 54
End: 2019-11-25
Payer: MEDICARE

## 2019-11-25 VITALS
DIASTOLIC BLOOD PRESSURE: 60 MMHG | WEIGHT: 125 LBS | HEIGHT: 64 IN | BODY MASS INDEX: 21.34 KG/M2 | SYSTOLIC BLOOD PRESSURE: 130 MMHG | OXYGEN SATURATION: 98 % | HEART RATE: 88 BPM | TEMPERATURE: 100 F

## 2019-11-25 DIAGNOSIS — N18.6 ESRD (END STAGE RENAL DISEASE) ON DIALYSIS: ICD-10-CM

## 2019-11-25 DIAGNOSIS — J20.9 ACUTE BRONCHITIS, UNSPECIFIED ORGANISM: Primary | ICD-10-CM

## 2019-11-25 DIAGNOSIS — Z99.2 ESRD (END STAGE RENAL DISEASE) ON DIALYSIS: ICD-10-CM

## 2019-11-25 PROCEDURE — 99214 OFFICE O/P EST MOD 30 MIN: CPT | Mod: S$PBB,,, | Performed by: INTERNAL MEDICINE

## 2019-11-25 PROCEDURE — 99999 PR PBB SHADOW E&M-EST. PATIENT-LVL III: CPT | Mod: PBBFAC,,, | Performed by: INTERNAL MEDICINE

## 2019-11-25 PROCEDURE — 99213 OFFICE O/P EST LOW 20 MIN: CPT | Mod: PBBFAC,PO | Performed by: INTERNAL MEDICINE

## 2019-11-25 PROCEDURE — 94640 AIRWAY INHALATION TREATMENT: CPT | Mod: PBBFAC,PO

## 2019-11-25 PROCEDURE — 99214 PR OFFICE/OUTPT VISIT, EST, LEVL IV, 30-39 MIN: ICD-10-PCS | Mod: S$PBB,,, | Performed by: INTERNAL MEDICINE

## 2019-11-25 PROCEDURE — 99999 PR PBB SHADOW E&M-EST. PATIENT-LVL III: ICD-10-PCS | Mod: PBBFAC,,, | Performed by: INTERNAL MEDICINE

## 2019-11-25 RX ORDER — BENZONATATE 200 MG/1
200 CAPSULE ORAL 3 TIMES DAILY PRN
Qty: 30 CAPSULE | Refills: 1 | Status: SHIPPED | OUTPATIENT
Start: 2019-11-25 | End: 2019-11-26 | Stop reason: SDUPTHER

## 2019-11-25 RX ORDER — ALBUTEROL SULFATE 90 UG/1
1-2 AEROSOL, METERED RESPIRATORY (INHALATION)
Qty: 1 INHALER | Refills: 6 | Status: SHIPPED | OUTPATIENT
Start: 2019-11-25 | End: 2019-11-26

## 2019-11-25 RX ORDER — IPRATROPIUM BROMIDE AND ALBUTEROL SULFATE 2.5; .5 MG/3ML; MG/3ML
3 SOLUTION RESPIRATORY (INHALATION)
Status: COMPLETED | OUTPATIENT
Start: 2019-11-25 | End: 2019-11-25

## 2019-11-25 RX ADMIN — IPRATROPIUM BROMIDE AND ALBUTEROL SULFATE 3 ML: 2.5; .5 SOLUTION RESPIRATORY (INHALATION) at 11:11

## 2019-11-25 NOTE — PROGRESS NOTES
Assessment & Plan (all problems are new to me)  Problem List Items Addressed This Visit        Renal/    ESRD (end stage renal disease) on dialysis    Overview     - Tuesday, Thursday, and Saturday  - [place]  - Dr. Hart, nephrology         Current Assessment & Plan     Stable.  Monitor            Other Visit Diagnoses     Acute bronchitis, unspecified organism    -  Primary  -    Flu negative.  Improved after Duoneb.  I counseled the patient on fluids, rest, OTC medications that can safely be used, hand/cough hygiene, expected course of illness, and when further medical attention would be warranted.   Start tessalon to help with cough suppression and albuterol for mucolytic and cough suppression.  Counseled on contacting the office if any worsening of symptoms for consideration of CXR +/- Abx.  I taught the patient the correct technique for dry powder inhaler, if applicable, and HFA with and without a spacer.  I have asked the patient the use a spacer with all HFA actuations.       Relevant Medications    albuterol-ipratropium 2.5 mg-0.5 mg/3 mL nebulizer solution 3 mL (Completed)    albuterol (PROVENTIL/VENTOLIN HFA) 90 mcg/actuation inhaler    benzonatate (TESSALON) 200 MG capsule    inhalation spacing device    Other Relevant Orders    POCT Influenza A/B Molecular            Health Maintenance reviewed, deferred due to acute illness.    Follow-up: Follow up if symptoms worsen or fail to improve.  ______________________________________________________________________    Chief Complaint  Chief Complaint   Patient presents with    Cough    Fever    Nasal Congestion       HPI  Eva Bloom is a 54 y.o. female with multiple medical diagnoses as listed in the medical history and problem list that presents for cough fever and congestion since Sat AM.  Pt is new to me but is known to this clinic with her last appointment being 11/2018 with PCP.      + cough, sore throat.  Feeling feverish.  + night sweats.   Tmax 99.9F.  No N/V/D.  Had flu shot.  No known contacts with flu.  No body aches. Took mucinex and coricidin and tylenol none of which have been helping.  + chest tightness as well.       PAST MEDICAL HISTORY:  Past Medical History:   Diagnosis Date    Anemia     Bronchitis 03/01/2017    Cancer 2016    kidney cancer    CMV (cytomegalovirus) antibody positive     Essential hypertension 9/23/2015    H/O herpes simplex type 2 infection     Herpes simplex type 1 antibody positive     History of kidney cancer     s/p left nephrectomy 1/2016    Hyperparathyroidism, unspecified     Hyperuricemia without signs of inflammatory arthritis and tophaceous disease     Kidney stones     LGSIL (low grade squamous intraepithelial dysplasia)     Myocardiopathy 7/21/2017    Prediabetes     Proteinuria     Renal disorder     Thyroid nodule     Urate nephropathy        PAST SURGICAL HISTORY:  Past Surgical History:   Procedure Laterality Date    BREAST CYST EXCISION      COLONOSCOPY N/A 11/12/2015    NEPHRECTOMY-LAPAROSCOPIC Left 01/12/2016    PERITONEAL CATHETER INSERTION      Permacath insertion  01/12/2017    SALPINGOOPHORECTOMY Right 2016    [facility]---[facility]VINCI    TONSILLECTOMY      TUBAL LIGATION         SOCIAL HISTORY:  Social History     Socioeconomic History    Marital status:      Spouse name: Not on file    Number of children: 2    Years of education: Not on file    Highest education level: Not on file   Occupational History    Occupation: [employer]     Employer: [email or web address]   Social Needs    Financial resource strain: Not on file    Food insecurity:     Worry: Not on file     Inability: Not on file    Transportation needs:     Medical: Not on file     Non-medical: Not on file   Tobacco Use    Smoking status: Never Smoker    Smokeless tobacco: Never Used   Substance and Sexual Activity    Alcohol use: No     Comment: . 2 children. works at [employer].    Drug use: No    Sexual  activity: Yes     Partners: Male   Lifestyle    Physical activity:     Days per week: Not on file     Minutes per session: Not on file    Stress: Not on file   Relationships    Social connections:     Talks on phone: Not on file     Gets together: Not on file     Attends Christian service: Not on file     Active member of club or organization: Not on file     Attends meetings of clubs or organizations: Not on file     Relationship status: Not on file   Other Topics Concern    Not on file   Social History Narrative    Not on file       FAMILY HISTORY:  Family History   Problem Relation Age of Onset    Hypertension Mother     Diabetes Father     Kidney disease Father     No Known Problems Son     No Known Problems Son     Diabetes Maternal Grandfather     No Known Problems Sister     No Known Problems Brother     No Known Problems Maternal Grandmother     No Known Problems Paternal Grandmother     No Known Problems Paternal Grandfather     Heart disease Sister     No Known Problems Sister     No Known Problems Brother     No Known Problems Maternal Aunt     No Known Problems Maternal Uncle     No Known Problems Paternal Aunt     No Known Problems Paternal Uncle     Breast cancer Neg Hx     Colon cancer Neg Hx     Cancer Neg Hx     Stroke Neg Hx     Amblyopia Neg Hx     Blindness Neg Hx     Cataracts Neg Hx     Glaucoma Neg Hx     Macular degeneration Neg Hx     Retinal detachment Neg Hx     Strabismus Neg Hx     Thyroid disease Neg Hx        ALLERGIES AND MEDICATIONS: updated and reviewed.  Review of patient's allergies indicates:   Allergen Reactions    Coreg [carvedilol] Other (See Comments)     Nausea/vomiting    Allopurinol      Other reaction(s): abnormal transaminases     Current Outpatient Medications   Medication Sig Dispense Refill    acetaminophen (TYLENOL) 500 MG tablet Take 500 mg by mouth every 6 (six) hours as needed for Pain.      aluminum-magnesium  hydroxide-simethicone (MAALOX) 200-200-20 mg/5 mL Susp Take 30 mLs by mouth every 6 (six) hours as needed.       amLODIPine (NORVASC) 5 MG tablet TAKE 1 TABLET BY MOUTH EVERY DAY 90 tablet 3    cabozantinib (CABOMETYX) 60 mg Tab Take 60 mg by mouth once daily. 30 tablet 11    cloNIDine 0.2 mg/24 hr td ptwk (CATAPRES) 0.2 mg/24 hr PLACE 1 PATCH ONTO THE SKIN EVERY 7 DAYS. 12 patch 3    hydrALAZINE (APRESOLINE) 100 MG tablet TAKE 1 TABLET BY MOUTH EVERY 12 HOURS 180 tablet 3    isosorbide mononitrate (IMDUR) 30 MG 24 hr tablet TAKE 1 TABLET BY MOUTH EVERY DAY 90 tablet 3    lidocaine-prilocaine (EMLA) cream APPLY ATLEAST 30 MINUTES BEFORE TREATMENT 3 TIMES A WEEK 30 g 11    metoprolol succinate (TOPROL-XL) 50 MG 24 hr tablet Take 1 tablet (50 mg total) by mouth once daily. 90 tablet 3    multivitamin-Ca-iron-minerals (ONE-A-DAY WOMENS FORMULA) 27-0.4 mg Tab Take by mouth. 1 Tablet Oral Every day      olmesartan (BENICAR) 40 MG tablet Take 1 tablet (40 mg total) by mouth once daily. 90 tablet 3    ondansetron (ZOFRAN-ODT) 8 MG TbDL Take 1 tablet (8 mg total) by mouth every 8 (eight) hours as needed. 30 tablet 1    albuterol (PROVENTIL/VENTOLIN HFA) 90 mcg/actuation inhaler Inhale 1-2 puffs into the lungs every 4 to 6 hours as needed for Wheezing or Shortness of Breath (chest tightness). 1 Inhaler 6    benzonatate (TESSALON) 200 MG capsule Take 1 capsule (200 mg total) by mouth 3 (three) times daily as needed for Cough. 30 capsule 1    febuxostat (ULORIC) 40 mg Tab Take 1 tablet (40 mg total) by mouth once daily. (Patient not taking: Reported on 11/11/2019) 30 tablet 4    inhalation spacing device Use as directed for inhalation. 1 Device 0    psyllium husk-calcium (METAMUCIL PLUS CALCIUM) 1-60 gram-mg Cap Take by mouth. 2 Capsule Oral Every morning      sevelamer HCl (RENAGEL) 800 MG Tab Take 2 tablets (1,600 mg total) by mouth 3 (three) times daily with meals. (Patient not taking: Reported on  "11/11/2019) 600 tablet 3     No current facility-administered medications for this visit.          ROS  Review of Systems   Constitutional: Positive for chills (at HD only), diaphoresis and fever. Negative for unexpected weight change.   HENT: Positive for congestion and sore throat. Negative for ear pain, hearing loss, rhinorrhea and trouble swallowing.    Respiratory: Positive for cough and chest tightness. Negative for shortness of breath and wheezing.    Cardiovascular: Negative for chest pain, palpitations and leg swelling.   Gastrointestinal: Negative for abdominal pain, constipation, diarrhea, nausea and vomiting.   Musculoskeletal: Negative for arthralgias and myalgias.   Skin: Negative for color change and rash.   Neurological: Negative for dizziness, weakness, light-headedness and headaches.   Psychiatric/Behavioral: Negative for suicidal ideas.         Physical Exam  Vitals:    11/25/19 1058   BP: 130/60   Pulse: 88   Temp: 99.9 °F (37.7 °C)   SpO2: 98%   Weight: 56.7 kg (125 lb)   Height: 5' 4" (1.626 m)    Body mass index is 21.46 kg/m².  Weight: 56.7 kg (125 lb)   Height: 5' 4" (162.6 cm)   Physical Exam   Constitutional: She is oriented to person, place, and time. She appears well-developed and well-nourished. No distress.   HENT:   Head: Normocephalic and atraumatic.   Eyes: Pupils are equal, round, and reactive to light. Conjunctivae, EOM and lids are normal. No scleral icterus.   Neck: Full passive range of motion without pain. Neck supple. No JVD present. Carotid bruit is not present. No thyromegaly present.   Cardiovascular: Normal rate, regular rhythm, intact distal pulses and normal pulses. Exam reveals no S3, no S4 and no friction rub.   Murmur heard.  Pulmonary/Chest: Effort normal. No respiratory distress. She has no wheezes. She has rhonchi in the right middle field, the right lower field and the left lower field. She has no rales.   Abdominal: Soft. Bowel sounds are normal. There is no " tenderness.   Musculoskeletal: She exhibits no edema or tenderness.   Lymphadenopathy:        Head (right side): No submental and no submandibular adenopathy present.        Head (left side): No submental and no submandibular adenopathy present.     She has no cervical adenopathy.   Neurological: She is alert and oriented to person, place, and time.   Motor grossly intact.  Sensory grossly intact.  Symmetric facial movements palate elevated symmetrically tongue midline   Skin: Skin is warm and dry. No rash noted.   Psychiatric: She has a normal mood and affect. Her speech is normal and behavior is normal. Thought content normal.           Health Maintenance       Date Due Completion Date    Shingles Vaccine (1 of 2) 10/01/2014 ---    Hemoglobin A1c 12/26/2019 12/26/2018    Mammogram 10/23/2020 10/23/2019    Lipid Panel 09/08/2021 9/8/2016    Pneumococcal Vaccine (Highest Risk) (3 of 3 - PPSV23) 11/09/2021 11/9/2016    Pap Smear with HPV Cotest 11/16/2021 11/16/2018    TETANUS VACCINE 09/23/2025 9/23/2015    Colonoscopy 11/12/2025 11/12/2015

## 2019-11-26 DIAGNOSIS — J20.9 ACUTE BRONCHITIS, UNSPECIFIED ORGANISM: ICD-10-CM

## 2019-11-26 RX ORDER — ALBUTEROL SULFATE 90 UG/1
1-2 AEROSOL, METERED RESPIRATORY (INHALATION)
Qty: 1 INHALER | Refills: 6 | Status: SHIPPED | OUTPATIENT
Start: 2019-11-26 | End: 2021-03-09

## 2019-11-26 RX ORDER — BENZONATATE 200 MG/1
200 CAPSULE ORAL 3 TIMES DAILY PRN
Qty: 30 CAPSULE | Refills: 1 | Status: SHIPPED | OUTPATIENT
Start: 2019-11-26 | End: 2021-05-24

## 2019-11-26 NOTE — TELEPHONE ENCOUNTER
----- Message from Aida Otero sent at 11/26/2019  9:25 AM CST -----  Contact: Self :[phone]  Type: Patient Call Back    Who called:Self    What is the request in detail: pt is calling in regards to getting her medications that was sent to her pharmacy on yesterday sent to [facility] [place] because [facility] that [facility] but [facility] [place] [facility]    Can the clinic reply by [facility]? Call back    Would the patient rather a call back or a response via [facility]? Call back    Best call back number:[phone]

## 2019-12-01 NOTE — PROGRESS NOTES
Patient Name: Eva Bloom    : 1965  MRN: [id number]    Subjective:  Eva is a 54 y.o. female who presents today for:    Chief Complaint   Patient presents with    Cough     sx started 2 wks ago not getting better     Generalized Body Aches    Shortness of Breath    Sore Throat    Blister     on rt side of nose        HPI  Patient has multiple medical diagnoses as listed below in the history. She is ESRD on HD. Patient is new to me, but known to the clinic. She presents for evaluation as per chief complaint. She reports worsening symptoms. She was seen in clinic on  and diagnosed with bronchitis. She received prescription of albuterol inhaler PRN for wheezing and tessalon Perles. She reports low grade of 99 for last 3 days. Her cough is productive with green-yellow sputum. She reports shortness of breath that is worse after coughing and with exertion. The rescue inhaler does provide relief. She also complains of rash on nasolabial fold. The rash began as small blisters. She reports improvement of rash as it is now scabbed over. She denies itching or discharge. She does have previous positive antibody for HSV1. She denies dizziness, difficulty swallowing,  chest pain, n/v/d, or abdominal pain. No allergic or sick contacts.       Past Medical History  Past Medical History:   Diagnosis Date    Anemia     Bronchitis 2017    Cancer 2016    kidney cancer    CMV (cytomegalovirus) antibody positive     Essential hypertension 2015    H/O herpes simplex type 2 infection     Herpes simplex type 1 antibody positive     History of kidney cancer     s/p left nephrectomy 2016    Hyperparathyroidism, unspecified     Hyperuricemia without signs of inflammatory arthritis and tophaceous disease     Kidney stones     LGSIL (low grade squamous intraepithelial dysplasia)     Myocardiopathy 2017    Prediabetes     Proteinuria     Renal disorder     Thyroid nodule     Urate nephropathy         Past Surgical History  Past Surgical History:   Procedure Laterality Date    BREAST CYST EXCISION      COLONOSCOPY N/A 11/12/2015    NEPHRECTOMY-LAPAROSCOPIC Left 01/12/2016    PERITONEAL CATHETER INSERTION      Permacath insertion  01/12/2017    SALPINGOOPHORECTOMY Right 2016    [facility]---DAVINCI    TONSILLECTOMY      TUBAL LIGATION         Family History  Family History   Problem Relation Age of Onset    Hypertension Mother     Diabetes Father     Kidney disease Father     No Known Problems Son     No Known Problems Son     Diabetes Maternal Grandfather     No Known Problems Sister     No Known Problems Brother     No Known Problems Maternal Grandmother     No Known Problems Paternal Grandmother     No Known Problems Paternal Grandfather     Heart disease Sister     No Known Problems Sister     No Known Problems Brother     No Known Problems Maternal Aunt     No Known Problems Maternal Uncle     No Known Problems Paternal Aunt     No Known Problems Paternal Uncle     Breast cancer Neg Hx     Colon cancer Neg Hx     Cancer Neg Hx     Stroke Neg Hx     Amblyopia Neg Hx     Blindness Neg Hx     Cataracts Neg Hx     Glaucoma Neg Hx     Macular degeneration Neg Hx     Retinal detachment Neg Hx     Strabismus Neg Hx     Thyroid disease Neg Hx        Social History  Social History     Socioeconomic History    Marital status:      Spouse name: Not on file    Number of children: 2    Years of education: Not on file    Highest education level: Not on file   Occupational History    Occupation: [employer]     Employer: [id number]   Social Needs    Financial resource strain: Not on file    Food insecurity:     Worry: Not on file     Inability: Not on file    Transportation needs:     Medical: Not on file     Non-medical: Not on file   Tobacco Use    Smoking status: Never Smoker    Smokeless tobacco: Never Used   Substance and Sexual Activity    Alcohol use: No      Comment: . 2 children. works at [employer].    Drug use: No    Sexual activity: Yes     Partners: Male   Lifestyle    Physical activity:     Days per week: Not on file     Minutes per session: Not on file    Stress: Not on file   Relationships    Social connections:     Talks on phone: Not on file     Gets together: Not on file     Attends Episcopal service: Not on file     Active member of club or organization: Not on file     Attends meetings of clubs or organizations: Not on file     Relationship status: Not on file   Other Topics Concern    Not on file   Social History Narrative    Not on file       Current Medications  Current Outpatient Medications on File Prior to Visit   Medication Sig Dispense Refill    acetaminophen (TYLENOL) 500 MG tablet Take 500 mg by mouth every 6 (six) hours as needed for Pain.      albuterol (PROVENTIL/VENTOLIN HFA) 90 mcg/actuation inhaler Inhale 1-2 puffs into the lungs every 4 to 6 hours as needed for Wheezing or Shortness of Breath (chest tightness). 1 Inhaler 6    aluminum-magnesium hydroxide-simethicone (MAALOX) 200-200-20 mg/5 mL Susp Take 30 mLs by mouth every 6 (six) hours as needed.       amLODIPine (NORVASC) 5 MG tablet TAKE 1 TABLET BY MOUTH EVERY DAY 90 tablet 3    benzonatate (TESSALON) 200 MG capsule Take 1 capsule (200 mg total) by mouth 3 (three) times daily as needed for Cough. 30 capsule 1    cabozantinib (CABOMETYX) 60 mg Tab Take 60 mg by mouth once daily. 30 tablet 11    cloNIDine 0.2 mg/24 hr td ptwk (CATAPRES) 0.2 mg/24 hr PLACE 1 PATCH ONTO THE SKIN EVERY 7 DAYS. 12 patch 3    febuxostat (ULORIC) 40 mg Tab Take 1 tablet (40 mg total) by mouth once daily. 30 tablet 4    hydrALAZINE (APRESOLINE) 100 MG tablet TAKE 1 TABLET BY MOUTH EVERY 12 HOURS 180 tablet 3    inhalation spacing device Use as directed for inhalation. 1 Device 0    isosorbide mononitrate (IMDUR) 30 MG 24 hr tablet TAKE 1 TABLET BY MOUTH EVERY DAY 90 tablet 3     "lidocaine-prilocaine (EMLA) cream APPLY ATLEAST 30 MINUTES BEFORE TREATMENT 3 TIMES A WEEK 30 g 11    metoprolol succinate (TOPROL-XL) 50 MG 24 hr tablet Take 1 tablet (50 mg total) by mouth once daily. 90 tablet 3    multivitamin-Ca-iron-minerals (ONE-A-DAY WOMENS FORMULA) 27-0.4 mg Tab Take by mouth. 1 Tablet Oral Every day      olmesartan (BENICAR) 40 MG tablet Take 1 tablet (40 mg total) by mouth once daily. 90 tablet 3    ondansetron (ZOFRAN-ODT) 8 MG TbDL Take 1 tablet (8 mg total) by mouth every 8 (eight) hours as needed. 30 tablet 1    psyllium husk-calcium (METAMUCIL PLUS CALCIUM) 1-60 gram-mg Cap Take by mouth. 2 Capsule Oral Every morning      sevelamer HCl (RENAGEL) 800 MG Tab Take 2 tablets (1,600 mg total) by mouth 3 (three) times daily with meals. 600 tablet 3     No current facility-administered medications on file prior to visit.        Allergies   Review of patient's allergies indicates:   Allergen Reactions    Coreg [carvedilol] Other (See Comments)     Nausea/vomiting    Allopurinol      Other reaction(s): abnormal transaminases         ROS  Review of Systems   Constitutional: Positive for fatigue and fever. Negative for chills.   HENT: Positive for congestion and sore throat. Negative for ear pain, postnasal drip, rhinorrhea, sinus pressure, sinus pain and sneezing.    Eyes: Negative for pain, discharge and visual disturbance.   Respiratory: Positive for cough, shortness of breath and wheezing.    Cardiovascular: Negative for chest pain and palpitations.   Gastrointestinal: Negative for abdominal pain and nausea.   Musculoskeletal: Positive for myalgias.   Skin: Positive for rash.   Neurological: Negative for light-headedness and headaches.   Hematological: Negative for adenopathy.         Objective:    /70   Pulse 101   Temp 99.2 °F (37.3 °C)   Ht 5' 3" (1.6 m)   Wt 57 kg (125 lb 10.6 oz)   LMP 10/24/2016   SpO2 (!) 93%   BMI 22.26 kg/m²     Physical Exam   Constitutional: " Vital signs are normal.   HENT:   Head: Normocephalic.   Right Ear: Hearing, tympanic membrane, external ear and ear canal normal. Tympanic membrane is not bulging. No middle ear effusion.   Left Ear: Hearing, tympanic membrane, external ear and ear canal normal. Tympanic membrane is not bulging.  No middle ear effusion.   Nose: Nose normal. Right sinus exhibits no maxillary sinus tenderness and no frontal sinus tenderness. Left sinus exhibits no maxillary sinus tenderness and no frontal sinus tenderness.   Mouth/Throat: Uvula is midline, oropharynx is clear and moist and mucous membranes are normal. No oropharyngeal exudate, posterior oropharyngeal edema or posterior oropharyngeal erythema.   Eyes: Pupils are equal, round, and reactive to light. Conjunctivae, EOM and lids are normal.   Cardiovascular: Normal rate, regular rhythm, S1 normal and S2 normal.   Pulmonary/Chest: Effort normal. She has decreased breath sounds in the right middle field and the right lower field. She has wheezes in the right upper field and the left upper field.   Lymphadenopathy:     She has no cervical adenopathy.        Right: No supraclavicular adenopathy present.        Left: No supraclavicular adenopathy present.   Neurological: She is alert.   Skin: Skin is warm, dry and intact. Rash noted. Rash is vesicular.        Psychiatric: She has a normal mood and affect. Judgment normal.       Assessment/Plan:  Eva Bloom is a 54 y.o. female who presents today for :    Eva was seen today for cough, generalized body aches, shortness of breath, sore throat and blister.    Diagnoses and all orders for this visit:    Lower respiratory infection (e.g., bronchitis, pneumonia, pneumonitis, pulmonitis)  -     doxycycline (MONODOX) 100 MG capsule; Take 1 capsule (100 mg total) by mouth 2 (two) times daily. for 10 days  -     promethazine-dextromethorphan (PROMETHAZINE-DM) 6.25-15 mg/5 mL Syrp; Take 5 mLs by mouth every 4 to 6 hours as  needed.    Will treat patient with antibiotic based on exam findings. Patient with decreased lung sound in lower and middle right lobes with scattered wheezing in upper lobes bilaterally. Her O2 sat was 93% on intake but she is in no apparent respiratory distress. No use of accessory muscles. Her O2 sat did improve to 95% while taking the history. Advised to use promethazine DM and mucinex to help with cough and congestion. continue use of rescue inhaler PRN for SOB and wheezing. Tylenol PRN of fever and body aches.     HSV-1 (herpes simplex virus 1) infection   HSV rash on nasolabial fold with routine healing and vesicular blisters that have crusted. There are no active blister therefore antiviral is not needed at this time. Advised to keep affected area moist to avoid cracking and counseled on risks of bacterial infection.      Counseled patient on the clinical course of conditions including symptomatology, treatment and prevention.  Counseled regarding risks, benefits, and limitations of medications.  Advised patient to seek urgent/emergent care if symptoms intensify.  Sent patient with informational material about diagnosis.  Patient gave verbal understanding and agreement of plan.      Problem list issues addressed during this visit    Herpes simplex type 1 antibody positive  As above    ESRD (end stage renal disease) on dialysis  Continue with scheduled HD  Reviewed prescribed medications, no renal dosing necessary for above prescriptions.         Health maintenance reviewed and discussed, deferred at this time due to acute illness      I spent >50% of this 25 minute encounter counseling the patient on diagnoses, risk factors, and treatments.         Encouraged to call/return to clinic if symptoms fail to improve    Blanca Hidalgo PA-C  [facility]

## 2019-12-02 ENCOUNTER — OFFICE VISIT (OUTPATIENT)
Dept: FAMILY MEDICINE | Facility: CLINIC | Age: 54
End: 2019-12-02
Payer: COMMERCIAL

## 2019-12-02 VITALS
DIASTOLIC BLOOD PRESSURE: 70 MMHG | HEIGHT: 63 IN | OXYGEN SATURATION: 93 % | HEART RATE: 101 BPM | TEMPERATURE: 99 F | SYSTOLIC BLOOD PRESSURE: 122 MMHG | BODY MASS INDEX: 22.27 KG/M2 | WEIGHT: 125.69 LBS

## 2019-12-02 DIAGNOSIS — J22 LOWER RESPIRATORY INFECTION (E.G., BRONCHITIS, PNEUMONIA, PNEUMONITIS, PULMONITIS): Primary | ICD-10-CM

## 2019-12-02 DIAGNOSIS — B00.9 HSV-1 (HERPES SIMPLEX VIRUS 1) INFECTION: ICD-10-CM

## 2019-12-02 DIAGNOSIS — B00.9 HERPES SIMPLEX TYPE 1 ANTIBODY POSITIVE: ICD-10-CM

## 2019-12-02 DIAGNOSIS — N18.6 ESRD (END STAGE RENAL DISEASE) ON DIALYSIS: ICD-10-CM

## 2019-12-02 DIAGNOSIS — Z99.2 ESRD (END STAGE RENAL DISEASE) ON DIALYSIS: ICD-10-CM

## 2019-12-02 PROCEDURE — 99214 OFFICE O/P EST MOD 30 MIN: CPT | Mod: PBBFAC,PO | Performed by: PHYSICIAN ASSISTANT

## 2019-12-02 PROCEDURE — 99214 PR OFFICE/OUTPT VISIT, EST, LEVL IV, 30-39 MIN: ICD-10-PCS | Mod: S$PBB,,, | Performed by: PHYSICIAN ASSISTANT

## 2019-12-02 PROCEDURE — 99999 PR PBB SHADOW E&M-EST. PATIENT-LVL IV: CPT | Mod: PBBFAC,,, | Performed by: PHYSICIAN ASSISTANT

## 2019-12-02 PROCEDURE — 99214 OFFICE O/P EST MOD 30 MIN: CPT | Mod: S$PBB,,, | Performed by: PHYSICIAN ASSISTANT

## 2019-12-02 PROCEDURE — 99999 PR PBB SHADOW E&M-EST. PATIENT-LVL IV: ICD-10-PCS | Mod: PBBFAC,,, | Performed by: PHYSICIAN ASSISTANT

## 2019-12-02 RX ORDER — DOXYCYCLINE 100 MG/1
100 CAPSULE ORAL 2 TIMES DAILY
Qty: 20 CAPSULE | Refills: 0 | Status: SHIPPED | OUTPATIENT
Start: 2019-12-02 | End: 2019-12-12

## 2019-12-02 RX ORDER — PROMETHAZINE HYDROCHLORIDE AND DEXTROMETHORPHAN HYDROBROMIDE 6.25; 15 MG/5ML; MG/5ML
5 SYRUP ORAL
Qty: 118 ML | Refills: 0 | Status: SHIPPED | OUTPATIENT
Start: 2019-12-02 | End: 2019-12-12

## 2019-12-02 NOTE — PATIENT INSTRUCTIONS
Treating Pneumonia  Pneumonia is an infection of one or both of the lungs. Pneumonia:  · Is usually caused by either a virus or a bacteria  · Can be very serious, especially in infants, young children, and older adults. Its also serious for those with other long-term health problems or weakened immune systems.  · Is sometimes treated at home and sometimes in the hospital    Antibiotic medicines  Antibiotics may be prescribed for pneumonia caused by bacteria. They may be pills (oral medicines), or shots (injections). Or they may be given by IV (intravenously) into a vein. If you are taking oral medicines at home:  · Fill your prescription and start taking your medicine as soon as you can.  · You will likely start to feel better in a day or 2, but dont stop taking the antibiotic.  · Use a pill organizer to help you remember to take your medicine.  · Let your healthcare provider know if you have side effects.  · Take your medicine exactly as directed on the label. Talk to your provider or pharmacist if you have any questions.  Antiviral medicines  Antiviral medicine may be prescribed for pneumonia caused by a virus. For example, antiviral medicine may be prescribed for pneumonia caused by the flu virus. Antibiotics do not work against viruses. If you are taking antiviral medicine at home:  · Fill your prescription and start taking your medicine as soon as you can.  · Talk with your provider or pharmacist about possible side effects.  · Take the medicine exactly as instructed.  To relieve symptoms  There are many medicines that can help relieve symptoms of pneumonia. Some are prescription and some are over-the-counter.  Your healthcare provider may recommend:  · Acetaminophen or ibuprofen to lower your fever and to lessen headache or other pain  · Cough medicine to loosen mucus or to reduce coughing  Make sure you check with your healthcare provider or pharmacist before taking any over-the-counter  medicines.  Special treatments  If you are hospitalized for pneumonia, you may have other therapies, including:  · Inhaled medicines to help with breathing or chest congestion  · Supplemental oxygen to increase low oxygen levels  Drink fluids and eat healthy  You should eat healthy to help your body fight the infection. Drinking a lot of fluids helps to replace fluids lost from fever and to loosen mucus in your chest.  · Diet. Make healthy food choices, including fruits and vegetables, lean meats and other proteins, 100% whole grain and low- or no-fat dairy products.  · Fluids. Drink at least 6 to 8 tall glasses a day. Water and 100% fruit or vegetable juice are best.  Get plenty of rest and sleep  You may be more tired than usual for a while. It is important to get enough sleep at night. Its also important to rest during the day. Talk with your healthcare provider if coughing or other symptoms are interfering with your sleep.  Preventing the spread of germs  The best thing you can do to prevent spreading germs is to wash your hands often. You should:  · Rub your hand with soap and water for 20 to 30 seconds.  · Clean in between your fingers, the backs of your hands, and around your nails.  · Dry your hands on a separate towel or use paper towels.  You should also:  · Keep alcohol-based hand  nearby.  · Make sure you also clean surfaces that you touch. Use a product that kills all types of germs.  · Stay away from others until you are feeling better.  When to call your healthcare provider  Call your healthcare provider if you have any of the following:  · Symptoms get worse  · Fever continues  · Shortness of breath gets worse  · Increased mucus or mucus that is darker in color  · Coughing gets worse  · Lips or fingers are bluish in color  · Side effects from your medicine   Date Last Reviewed: 12/1/2016  © 0488-5423 [employer]. [address] [place] [address], [place] [address]. All rights reserved.  This information is not intended as a substitute for professional medical care. Always follow your healthcare professional's instructions.

## 2019-12-02 NOTE — ASSESSMENT & PLAN NOTE
Continue with scheduled HD  Reviewed prescribed medications, no renal dosing necessary for above prescriptions.

## 2019-12-02 NOTE — ASSESSMENT & PLAN NOTE
Nasolabial HSV rash on nasolabial fold with routine healing and vesicular blisters that have crusted. There are no active blister therefore valtrex is not needed at this time. Advised to keep affected area moist to avoid cracking and counseled on risks of bacterial infection.

## 2019-12-10 DIAGNOSIS — C64.9 METASTATIC RENAL CELL CARCINOMA, UNSPECIFIED LATERALITY: ICD-10-CM

## 2019-12-19 DIAGNOSIS — I10 ESSENTIAL HYPERTENSION: ICD-10-CM

## 2019-12-20 RX ORDER — METOPROLOL SUCCINATE 50 MG/1
TABLET, EXTENDED RELEASE ORAL
Qty: 90 TABLET | Refills: 3 | Status: SHIPPED | OUTPATIENT
Start: 2019-12-20 | End: 2020-06-01

## 2020-01-07 RX ORDER — SEVELAMER HYDROCHLORIDE 800 MG/1
1600 TABLET, FILM COATED ORAL
Qty: 540 TABLET | Refills: 4 | OUTPATIENT
Start: 2020-01-07

## 2020-01-08 ENCOUNTER — OFFICE VISIT (OUTPATIENT)
Dept: FAMILY MEDICINE | Facility: CLINIC | Age: 55
End: 2020-01-08
Payer: MEDICARE

## 2020-01-08 ENCOUNTER — LAB VISIT (OUTPATIENT)
Dept: LAB | Facility: HOSPITAL | Age: 55
End: 2020-01-08
Payer: MEDICARE

## 2020-01-08 VITALS
BODY MASS INDEX: 22.43 KG/M2 | HEART RATE: 76 BPM | SYSTOLIC BLOOD PRESSURE: 170 MMHG | TEMPERATURE: 99 F | WEIGHT: 126.56 LBS | HEIGHT: 63 IN | OXYGEN SATURATION: 96 % | DIASTOLIC BLOOD PRESSURE: 80 MMHG

## 2020-01-08 DIAGNOSIS — R73.03 PREDIABETES: ICD-10-CM

## 2020-01-08 DIAGNOSIS — I10 ESSENTIAL HYPERTENSION: ICD-10-CM

## 2020-01-08 DIAGNOSIS — R19.7 DIARRHEA, UNSPECIFIED TYPE: ICD-10-CM

## 2020-01-08 DIAGNOSIS — R11.2 NON-INTRACTABLE VOMITING WITH NAUSEA, UNSPECIFIED VOMITING TYPE: Primary | ICD-10-CM

## 2020-01-08 DIAGNOSIS — R11.2 NON-INTRACTABLE VOMITING WITH NAUSEA, UNSPECIFIED VOMITING TYPE: ICD-10-CM

## 2020-01-08 LAB
ANION GAP SERPL CALC-SCNC: 9 MMOL/L (ref 8–16)
BUN SERPL-MCNC: 55 MG/DL (ref 6–20)
CALCIUM SERPL-MCNC: 9.8 MG/DL (ref 8.7–10.5)
CHLORIDE SERPL-SCNC: 102 MMOL/L (ref 95–110)
CO2 SERPL-SCNC: 30 MMOL/L (ref 23–29)
CREAT SERPL-MCNC: 10.1 MG/DL (ref 0.5–1.4)
EST. GFR  (AFRICAN AMERICAN): 4.5 ML/MIN/1.73 M^2
EST. GFR  (NON AFRICAN AMERICAN): 3.9 ML/MIN/1.73 M^2
ESTIMATED AVG GLUCOSE: 80 MG/DL (ref 68–131)
GLUCOSE SERPL-MCNC: 97 MG/DL (ref 70–110)
HBA1C MFR BLD HPLC: 4.4 % (ref 4–5.6)
POTASSIUM SERPL-SCNC: 3.8 MMOL/L (ref 3.5–5.1)
SODIUM SERPL-SCNC: 141 MMOL/L (ref 136–145)

## 2020-01-08 PROCEDURE — 99214 PR OFFICE/OUTPT VISIT, EST, LEVL IV, 30-39 MIN: ICD-10-PCS | Mod: S$PBB,,, | Performed by: NURSE PRACTITIONER

## 2020-01-08 PROCEDURE — 80048 BASIC METABOLIC PNL TOTAL CA: CPT

## 2020-01-08 PROCEDURE — 99999 PR PBB SHADOW E&M-EST. PATIENT-LVL III: CPT | Mod: PBBFAC,,, | Performed by: NURSE PRACTITIONER

## 2020-01-08 PROCEDURE — 99214 OFFICE O/P EST MOD 30 MIN: CPT | Mod: S$PBB,,, | Performed by: NURSE PRACTITIONER

## 2020-01-08 PROCEDURE — 83036 HEMOGLOBIN GLYCOSYLATED A1C: CPT

## 2020-01-08 PROCEDURE — 99999 PR PBB SHADOW E&M-EST. PATIENT-LVL III: ICD-10-PCS | Mod: PBBFAC,,, | Performed by: NURSE PRACTITIONER

## 2020-01-08 PROCEDURE — 99213 OFFICE O/P EST LOW 20 MIN: CPT | Mod: PBBFAC,PO | Performed by: NURSE PRACTITIONER

## 2020-01-08 PROCEDURE — 36415 COLL VENOUS BLD VENIPUNCTURE: CPT | Mod: PO

## 2020-01-08 RX ORDER — DICYCLOMINE HYDROCHLORIDE 10 MG/1
10 CAPSULE ORAL
Qty: 30 CAPSULE | Refills: 0 | Status: SHIPPED | OUTPATIENT
Start: 2020-01-08 | End: 2020-01-11

## 2020-01-08 NOTE — PROGRESS NOTES
Subjective:       Patient ID: Eva Bloom is a 54 y.o. female.    Chief Complaint: Emesis (X Sunday ) and Diarrhea (X Sunday )    Nausea   This is a new problem. The current episode started in the past 7 days (started about 2 days ago). The problem has been gradually improving. Associated symptoms include nausea and vomiting. Pertinent negatives include no abdominal pain, chills, fever or sore throat. Associated symptoms comments: Yesterday had diarrhea about 5 times. Nothing aggravates the symptoms. Treatments tried: pepto.       Past Medical History:   Diagnosis Date    Anemia     Bronchitis 03/01/2017    Cancer 2016    kidney cancer    CMV (cytomegalovirus) antibody positive     Essential hypertension 9/23/2015    H/O herpes simplex type 2 infection     Herpes simplex type 1 antibody positive     History of kidney cancer     s/p left nephrectomy 1/2016    Hyperparathyroidism, unspecified     Hyperuricemia without signs of inflammatory arthritis and tophaceous disease     Kidney stones     LGSIL (low grade squamous intraepithelial dysplasia)     Myocardiopathy 7/21/2017    Prediabetes     Proteinuria     Renal disorder     Thyroid nodule     Urate nephropathy        Social History     Socioeconomic History    Marital status:      Spouse name: Not on file    Number of children: 2    Years of education: Not on file    Highest education level: Not on file   Occupational History    Occupation: [employer]     Employer: [id number]   Social Needs    Financial resource strain: Not on file    Food insecurity:     Worry: Not on file     Inability: Not on file    Transportation needs:     Medical: Not on file     Non-medical: Not on file   Tobacco Use    Smoking status: Never Smoker    Smokeless tobacco: Never Used   Substance and Sexual Activity    Alcohol use: No     Comment: . 2 children. works at [employer].    Drug use: No    Sexual activity: Yes     Partners: Male  "  Lifestyle    Physical activity:     Days per week: Not on file     Minutes per session: Not on file    Stress: Not on file   Relationships    Social connections:     Talks on phone: Not on file     Gets together: Not on file     Attends [employer] service: Not on file     Active member of club or organization: Not on file     Attends meetings of clubs or organizations: Not on file     Relationship status: Not on file   Other Topics Concern    Not on file   Social History Narrative    Not on file       Past Surgical History:   Procedure Laterality Date    BREAST CYST EXCISION      COLONOSCOPY N/A 11/12/2015    NEPHRECTOMY-LAPAROSCOPIC Left 01/12/2016    PERITONEAL CATHETER INSERTION      Permacath insertion  01/12/2017    SALPINGOOPHORECTOMY Right 2016    [facility]---DAVINCI    TONSILLECTOMY      TUBAL LIGATION         Review of Systems   Constitutional: Negative for chills and fever.   HENT: Negative for sore throat.    Gastrointestinal: Positive for diarrhea, nausea and vomiting. Negative for abdominal distention, abdominal pain and constipation.   All other systems reviewed and are negative.      Objective:   BP (!) 170/80 (BP Location: Right arm, Patient Position: Sitting, BP Method: Large (Manual))   Pulse 76   Temp 98.6 °F (37 °C) (Oral)   Ht 5' 3" (1.6 m)   Wt 57.4 kg (126 lb 8.7 oz)   LMP 10/24/2016   SpO2 96%   BMI 22.42 kg/m²      Physical Exam   Constitutional: She is oriented to person, place, and time. She appears well-developed and well-nourished.   HENT:   Head: Normocephalic and atraumatic.   Cardiovascular: Normal rate and regular rhythm.   Pulmonary/Chest: Effort normal and breath sounds normal. No respiratory distress. She has no decreased breath sounds.   Abdominal: Soft. She exhibits no distension and no mass. Bowel sounds are increased. There is no tenderness. There is no rigidity and no guarding.   Neurological: She is alert and oriented to person, place, and time.   Skin: She is " not diaphoretic. No pallor.   Psychiatric: She has a normal mood and affect. Her speech is normal and behavior is normal.       Assessment:       1. Non-intractable vomiting with nausea, unspecified vomiting type    2. Diarrhea, unspecified type    3. Prediabetes        Plan:       Eva was seen today for emesis and diarrhea.    Diagnoses and all orders for this visit:    Non-intractable vomiting with nausea, unspecified vomiting type  -     Basic metabolic panel; Future  -     Improving. Will get labs, she is on dialysis., will check electrolytes    Diarrhea, unspecified type  -     Basic metabolic panel; Future  -     Improving        -     dicyclomine (BENTYL) 10 MG capsule; Take 1 capsule (10 mg total) by mouth before meals as needed.    Prediabetes  -     Hemoglobin A1c; Future  -     Stable. Continue to monitor  -     The patient is asked to make an attempt to improve diet and exercise patterns to aid in medical management of this problem.    Problem List Items Addressed This Visit     Prediabetes    Relevant Orders    Hemoglobin A1c    Essential hypertension    Current Assessment & Plan     This problem is currently not controlled. Please follow up with your PCP as planned to discuss adjustments to your treatment plan.  The patient is asked to make an attempt to improve diet and exercise patterns to aid in medical management of this problem.  She reports normal blood pressures at home. She is on dialysis.           Other Visit Diagnoses     Non-intractable vomiting with nausea, unspecified vomiting type    -  Primary    Relevant Orders    Basic metabolic panel    Diarrhea, unspecified type        Relevant Orders    Basic metabolic panel          Likely viral, with improving symptoms. Will follow up after lab results available. She has zofran she can take for nausea.  Follow up if symptoms worsen or fail to improve.

## 2020-01-08 NOTE — PATIENT INSTRUCTIONS
Noninfectious Gastroenteritis (Ages 6 Years to Adult)    Gastroenteritis can cause nausea, vomiting, diarrhea, and abdominal cramping. This may occur as a result of food sensitivity, inflammation of your gastrointestinal tract, medicines, stress, or other causes not related to infection. Your symptoms will usually last from 1 to 3 days, but can last longer. Antibiotics are not effective, but simple home treatment will be helpful.  Home care  Medicine  · You may use acetaminophen or NSAID medicines like ibuprofen or naproxen to control fever, unless another medicine is prescribed. (Note: If you have chronic liver or kidney disease, or ever had a stomach ulcer or gastrointestinalI bleeding, talk with your healthcare provider before using these medicines.) Aspirin should never be used in anyone under 18 years of age who is ill with a fever. It may cause severe liver damage. Don't increase your NSAID medicines if you are already taking these medicines for another condition (like arthritis). Don't use NSAIDS if you are on aspirin (such as for heart disease, or after a stroke).  · If medicines for diarrhea or vomiting are prescribed, take only as directed.  General care and preventing spread of the illness  · If symptoms are severe, rest at home for the next 24 hours or until you feel better.  · Hand washing with soap and water is the best way to prevent the spread of infection. Wash your hands after touching anyone who is sick.  · Wash your hands after using the toilet and before meals. Clean the toilet after each use.  · Caffeine, tobacco, and alcohol can make your diarrhea, cramping, and pain worse.  Diet  · Water and clear liquids are important so you do not get dehydrated. Drink a small amount at a time.  · Do not force yourself to eat, especially if you have cramps, vomiting, or diarrhea. When you finally decide to start eating, do not eat large amounts at a time, even if you are hungry.  · If you eat, avoid  fatty, greasy, spicy, or fried foods.  · Do not eat dairy products if you have diarrhea; they can make the diarrhea worse.  During the first 24 hours (the first full day), follow the diet below:  · Beverages: Water, clear liquids, soft drinks without caffeine, like ginger ale; mineral water (plain or flavored); decaffeinated tea and coffee.  · Soups: Clear broth, consommé, and bouillon Sports drinks aren't a good choice because they have too much sugar and not enough electrolytes. In this case, commercially available products called oral rehydration solutions are best.  · Desserts: Plain gelatin, popsicles, and fruit juice bars.  During the next 24 hours (the second day), you may add the following to the above if you have improved. If not, continue what you did the first day:  · Hot cereal, plain toast, bread, rolls, crackers  · Plain noodles, rice, mashed potatoes, chicken noodle or rice soup  · Unsweetened canned fruit (avoid pineapple), bananas  · Limit caffeine and chocolate. No spices or seasonings except salt.  During the next 24 hours  · Gradually resume a normal diet, as you feel better and your symptoms improve.  · If at any time your symptoms start getting worse, go back to clear liquids until you feel better.  Food preparation  · If you have diarrhea, you should not prepare food for others. When you  prepare food for yourself, wash your hands before and after.  · Wash your hands after using cutting boards, countertops, and knives that have been in contact with raw food.  · Keep uncooked meats away from cooked and ready-to-eat foods.  Follow-up care  Follow up with your healthcare provider if you are not improving over the next 2 to 3 days, or as advised. If a stool (diarrhea) sample was taken, call for the results as directed.  When to seek medical care  Call your healthcare provider right away if any of these occur:   · Increasing abdominal pain or constant lower right abdominal pain  · Continued  vomiting (unable to keep liquids down)  · Frequent diarrhea (more than 5 times a day)  · Blood in vomit or stool (black or red color)  · Inability to tolerate solid food after a few days.  · Dark urine, reduced urine output  · Weakness, dizziness  · Drowsiness  · Fever of 100.4ºF (38.0ºC) or higher, or as directed by your healthcare provider  · New rash  Call 911  Call 911 if any of these occur:  · Trouble breathing  · Chest pain  · Confusion  · Severe drowsiness or trouble awakening  · Seizure  · Stiff neck  Date Last Reviewed: 11/16/2015 © 2000-2017 [employer]. [address] [place] [address], [place] [address]. All rights reserved. This information is not intended as a substitute for professional medical care. Always follow your healthcare professional's instructions.        Viral Gastroenteritis (Adult)    Gastroenteritis is commonly called the stomach flu. It is most often caused by a virus that affects the stomach and intestinal tract and usually lasts from 2 to 7 days. Common viruses causing gastroenteritis include norovirus, rotavirus, and hepatitis A. Non-viral causes of gastroenteritis include bacteria, parasites, and toxins.  The danger from repeated vomiting or diarrhea is dehydration. This is the loss of too much fluid from the body. When this occurs, body fluids must be replaced. Antibiotics do not help with this illness because it is usually viral.Simple home treatment will be helpful.  Symptoms of viral gastroenteritis may include:  · Watery, loose stools  · Stomach pain or abdominal cramps  · Fever and chills  · Nausea and vomiting  · Loss of bowel control  · Headache  Home care  Gastroenteritis is transmitted by contact with the stool or vomit of an infected person. This can occur from person to person or from contact with a contaminated surface.  Follow these guidelines when caring for yourself at home:  · If symptoms are severe, rest at home for the next 24 hours or until you are feeling  better.  · Wash your hands with soap and water or use alcohol-based  to prevent the spread of infection. Wash your hands after touching anyone who is sick.  · Wash your hands or use alcohol-based  after using the toilet and before meals. Clean the toilet after each use.  Remember these tips when preparing food:  · People with diarrhea should not prepare or serve food to others. When preparing foods, wash your hands before and after.  · Wash your hands after using cutting boards, countertops, knives, or utensils that have been in contact with raw food.  · Keep uncooked meats away from cooked and ready-to-eat foods.  Medicine  You may use acetaminophen or NSAID medicines like ibuprofen or naproxen to control fever unless another medicine was given. If you have chronic liver or kidney disease, talk with your healthcare provider before using these medicines. Also talk with your provider if you've had a stomach ulcer or gastrointestinal bleeding. Don't give aspirin to anyone under 18 years of age who is ill with a fever. It may cause severe liver damage. Don't use NSAIDS is you are already taking one for another condition (like arthritis) or are on aspirin (such as for heart disease or after a stroke).  If medicine for vomiting or diarrhea are prescribed, take these only as directed. Do not take over-the-counter medicines for vomiting or diarrhea unless instructed by your healthcare provider.  Diet  Follow these guidelines for food:  · Water and liquids are important so you don't get dehydrated. Drink a small amount at a time or suck on ice chips if you are vomiting.  · If you eat, avoid fatty, greasy, spicy, or fried foods.  · Don't eat dairy if you have diarrhea. This can make diarrhea worse.  · Avoid tobacco, alcohol, and caffeine which may worsen symptoms.  During the first 24 hours (the first full day), follow the diet below:  · Beverages. Sports drinks, soft drinks without caffeine, ginger ale,  mineral water (plain or flavored), decaffeinated tea and coffee. If you are very dehydrated, sports drinks aren't a good choice. They have too much sugar and not enough electrolytes. In this case, commercially available products called oral rehydration solutions, are best.  · Soups. Eat clear broth, consommé, and bouillon.  · Desserts. Eat gelatin, popsicles, and fruit juice bars.  During the next 24 hours (the second day), you may add the following to the above:  · Hot cereal, plain toast, bread, rolls, and crackers  · Plain noodles, rice, mashed potatoes, chicken noodle or rice soup  · Unsweetened canned fruit (avoid pineapple), bananas  · Limit fat intake to less than 15 grams per day. Do this by avoiding margarine, butter, oils, mayonnaise, sauces, gravies, fried foods, peanut butter, meat, poultry, and fish.  · Limit fiber and avoid raw or cooked vegetables, fresh fruits (except bananas), and bran cereals.  · Limit caffeine and chocolate. Don't use spices or seasonings other than salt.  · Limit dairy products.  · Avoid alcohol.  During the next 24 hours:  · Gradually resume a normal diet as you feel better and your symptoms improve.  · If at any time it starts getting worse again, go back to clear liquids until you feel better.  Follow-up care  Follow up with your healthcare provider, or as advised. Call your provider if you don't get better within 24 hours or if diarrhea lasts more than a week. Also follow up if you are unable to keep down liquids and get dehydrated. If a stool (diarrhea) sample was taken, call as directed for the results.  Call 911  Call 911 if any of these occur:  · Trouble breathing  · Chest pain  · Confused  · Severe drowsiness or trouble awakening  · Fainting or loss of consciousness  · Rapid heart rate  · Seizure  · Stiff neck  When to seek medical advice  Call your healthcare provider right away if any of these occur:  · Abdominal pain that gets worse  · Continued vomiting (unable to  keep liquids down)  · Frequent diarrhea (more than 5 times a day)  · Blood in vomit or stool (black or red color)  · Dark urine, reduced urine output, or extreme thirst  · Weakness or dizziness  · Drowsiness  · Fever of 100.4°F (38°C) oral or higher that does not get better with fever medicine  · New rash  Date Last Reviewed: 1/3/2016  © 3201-1563 [employer]. [address] [place] [address], [place] [address]. All rights reserved. This information is not intended as a substitute for professional medical care. Always follow your healthcare professional's instructions.

## 2020-01-08 NOTE — ASSESSMENT & PLAN NOTE
This problem is currently not controlled. Please follow up with your PCP as planned to discuss adjustments to your treatment plan.  The patient is asked to make an attempt to improve diet and exercise patterns to aid in medical management of this problem.  She reports normal blood pressures at home. She is on dialysis.

## 2020-01-10 NOTE — PROGRESS NOTES
"Subjective:       Patient ID: Eva Bloom is a 54 y.o. female.    Chief Complaint: RCC    HPI     54 y.o.female, patient of Dr. Rivera, to clinic for 8 week follow up and to review labs/scans for recurrent metastatic renal cell carcinoma. Pt is currently taking cabozantinib daily since December 2016. Pt is on ESRD with HD every TTS. Patient tolerating treat extremely well with little to no side effects.    Today, she denies any mouth sores, nausea, vomiting, diarrhea, constipation,weight loss, chest pain, leg swelling, headache, dizziness, or mood changes.    ECOG 1. She is accompanied alone.    Oncologic History (From Chart and Patient):  Eva Bloom is a 54 y.o.female with ESRD on HD who was found to have two L renal masses. She underwent L lap nephrectomy on 1/12/16. 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 Margins were negative.  Shehealed from surgery well, but then developed a large ovarian mass.  This was removed by gyn along with multiple sites of metastatic disease in the pelvis.  Path was c/w recurrence of RCC.     11/20/16 Pelvic ultrasound reveals "Large heterogeneous cystic and solid mass which appears to arise from the right ovary with worrisome imaging features for malignancy.  Differential diagnosis includes malignant tumors such as serous or mucinous cystadenocarcinoma.  It is important to note that the patient is at risk for ovarian torsion due to the size of the mass.Multiple uterine fibroids are identified with the largest in the uterine fundus."    11/22/16 pathology reveals "FINAL PATHOLOGIC DIAGNOSIS-RIGHT OVARY AND FALLOPIAN TUBE, RIGHT SALPINGO-OOPHORECTOMY:  -Positive for malignancy, high grade carcinoma morphologically and immunohistochemically consistent with metastasis from the patient's known renal cell carcinoma"    1/23/17 MRI abdomen reveals "Status " "post left nephrectomy, the left nephrectomy bed appears normal.There is a small liver lesion and a small splenic lesion, they cannot be fully characterized but they are unchanged from 5/27/16 suggestive of benign lesions.Multiple right kidney cysts.Cholelithiasis.Soft tissue oval density noted within the mesentery adjacent to the aorta right iliac bifurcation, nonspecific, could represent an enlarged lymph node, it is unchanged from prior studies"    1/23/17- CT Chest reveals "Stable pulmonary nodules, likely benign. No new pulmonary parenchymal abnormalities are identified. Enlarging nodular foci in partially visualized left upper abdomen. Peritoneal implants not excluded. Multiple thyroid nodules, measuring up to 1.6 cm."    4/5/17-MRI abdomen reveals "Again seen are the T2 hyperintense hepatic and splenic lesions that appear similar to prior exam and possibly represents hemangiomas. There are right renal cysts and the patient is status post left nephrectomy."    7/3/17- MRI abdomen "Left nephrectomy.  Right renal cysts with no malignant features.Liver and splenic lesions consistent with cysts versus hemangiomas.Cholelithiasis.Right pleural effusion and right basal atelectasis.Small pericardial effusion.Body wall edema and mesenteric edema."  7/3/17- CT chest "In this patient with a history of left renal cell carcinoma, there are 2 right lung nodules which are stable compared to January 2017.  2 left lung nodules seen on that study are not well-seen today, may be related to a small amount of pleural fluid.Bilateral pleural effusions, mildly decreased compared to prior study.Small amount of pericardial fluid, new."    Review of Systems   Constitutional: Negative for activity change, appetite change, chills, fatigue and fever.   HENT: Negative for congestion, hearing loss, mouth sores, postnasal drip, sore throat, tinnitus and voice change.    Eyes: Negative for pain and visual disturbance.   Respiratory: Negative " for cough, shortness of breath and wheezing.    Cardiovascular: Negative for chest pain, palpitations and leg swelling.   Gastrointestinal: Negative for abdominal pain, constipation, diarrhea, nausea and vomiting.   Endocrine: Negative for cold intolerance and heat intolerance.   Genitourinary: Negative for difficulty urinating, dyspareunia, dysuria, frequency, menstrual problem, urgency, vaginal bleeding, vaginal discharge and vaginal pain.   Musculoskeletal: Negative for arthralgias and myalgias.   Skin: Negative for color change, rash and wound.   Allergic/Immunologic: Negative for environmental allergies and food allergies.   Neurological: Negative for weakness, numbness and headaches.   Hematological: Negative for adenopathy. Does not bruise/bleed easily.   Psychiatric/Behavioral: Negative for agitation, confusion, hallucinations and sleep disturbance. The patient is not nervous/anxious.    All other systems reviewed and are negative.      Allergies:  Review of patient's allergies indicates:   Allergen Reactions    Allopurinol      Other reaction(s): abnormal transaminases       Medications:  Current Outpatient Medications   Medication Sig Dispense Refill    acetaminophen (TYLENOL) 500 MG tablet Take 500 mg by mouth every 6 (six) hours as needed for Pain.      albuterol (PROVENTIL/VENTOLIN HFA) 90 mcg/actuation inhaler Inhale 1-2 puffs into the lungs every 4 to 6 hours as needed for Wheezing or Shortness of Breath (chest tightness). 1 Inhaler 6    aluminum-magnesium hydroxide-simethicone (MAALOX) 200-200-20 mg/5 mL Susp Take 30 mLs by mouth every 6 (six) hours as needed.       amLODIPine (NORVASC) 5 MG tablet TAKE 1 TABLET BY MOUTH EVERY DAY 90 tablet 3    cabozantinib (CABOMETYX) 60 mg Tab Take 60 mg by mouth once daily. 30 tablet 11    cloNIDine 0.2 mg/24 hr td ptwk (CATAPRES) 0.2 mg/24 hr PLACE 1 PATCH ONTO THE SKIN EVERY 7 DAYS. 12 patch 3    dicyclomine (BENTYL) 10 MG capsule TAKE 1 CAPSULE (10 MG  TOTAL) BY MOUTH BEFORE MEALS AS NEEDED. 30 capsule 0    hydrALAZINE (APRESOLINE) 100 MG tablet TAKE 1 TABLET BY MOUTH EVERY 12 HOURS 180 tablet 3    inhalation spacing device Use as directed for inhalation. 1 Device 0    isosorbide mononitrate (IMDUR) 30 MG 24 hr tablet TAKE 1 TABLET BY MOUTH EVERY DAY 90 tablet 3    lidocaine-prilocaine (EMLA) cream APPLY ATLEAST 30 MINUTES BEFORE TREATMENT 3 TIMES A WEEK 30 g 11    metoprolol succinate (TOPROL-XL) 50 MG 24 hr tablet TAKE 1 TABLET BY MOUTH EVERY DAY 90 tablet 3    multivitamin-Ca-iron-minerals (ONE-A-DAY WOMENS FORMULA) 27-0.4 mg Tab Take by mouth. 1 Tablet Oral Every day      olmesartan (BENICAR) 40 MG tablet Take 1 tablet (40 mg total) by mouth once daily. 90 tablet 3    ondansetron (ZOFRAN-ODT) 8 MG TbDL Take 1 tablet (8 mg total) by mouth every 8 (eight) hours as needed. 30 tablet 1    psyllium husk-calcium (METAMUCIL PLUS CALCIUM) 1-60 gram-mg Cap Take by mouth. 2 Capsule Oral Every morning      benzonatate (TESSALON) 200 MG capsule Take 1 capsule (200 mg total) by mouth 3 (three) times daily as needed for Cough. (Patient not taking: Reported on 1/8/2020) 30 capsule 1    febuxostat (ULORIC) 40 mg Tab Take 1 tablet (40 mg total) by mouth once daily. (Patient not taking: Reported on 1/8/2020) 30 tablet 4    sevelamer HCl (RENAGEL) 800 MG Tab Take 2 tablets (1,600 mg total) by mouth 3 (three) times daily with meals. (Patient not taking: Reported on 1/8/2020) 600 tablet 3     No current facility-administered medications for this visit.        PMH:  Past Medical History:   Diagnosis Date    Anemia     Bronchitis 03/01/2017    Cancer 2016    kidney cancer    CMV (cytomegalovirus) antibody positive     Essential hypertension 9/23/2015    H/O herpes simplex type 2 infection     Herpes simplex type 1 antibody positive     History of kidney cancer     s/p left nephrectomy 1/2016    Hyperparathyroidism, unspecified     Hyperuricemia without signs of  inflammatory arthritis and tophaceous disease     Kidney stones     LGSIL (low grade squamous intraepithelial dysplasia)     Myocardiopathy 7/21/2017    Prediabetes     Proteinuria     Renal disorder     Thyroid nodule     Urate nephropathy        PSH:  Past Surgical History:   Procedure Laterality Date    BREAST CYST EXCISION      COLONOSCOPY N/A 11/12/2015    NEPHRECTOMY-LAPAROSCOPIC Left 01/12/2016    PERITONEAL CATHETER INSERTION      Permacath insertion  01/12/2017    SALPINGOOPHORECTOMY Right 2016    [facility]---DAVINCI    TONSILLECTOMY      TUBAL LIGATION         FamHx:  Family History   Problem Relation Age of Onset    Hypertension Mother     Diabetes Father     Kidney disease Father     No Known Problems Son     No Known Problems Son     Diabetes Maternal Grandfather     No Known Problems Sister     No Known Problems Brother     No Known Problems Maternal Grandmother     No Known Problems Paternal Grandmother     No Known Problems Paternal Grandfather     Heart disease Sister     No Known Problems Sister     No Known Problems Brother     No Known Problems Maternal Aunt     No Known Problems Maternal Uncle     No Known Problems Paternal Aunt     No Known Problems Paternal Uncle     Breast cancer Neg Hx     Colon cancer Neg Hx     Cancer Neg Hx     Stroke Neg Hx     Amblyopia Neg Hx     Blindness Neg Hx     Cataracts Neg Hx     Glaucoma Neg Hx     Macular degeneration Neg Hx     Retinal detachment Neg Hx     Strabismus Neg Hx     Thyroid disease Neg Hx        SocHx:  Social History     Socioeconomic History    Marital status:      Spouse name: Not on file    Number of children: 2    Years of education: Not on file    Highest education level: Not on file   Occupational History    Occupation: [employer]     Employer: [id number]   Social Needs    Financial resource strain: Not on file    Food insecurity:     Worry: Not on file     Inability: Not on file     Transportation needs:     Medical: Not on file     Non-medical: Not on file   Tobacco Use    Smoking status: Never Smoker    Smokeless tobacco: Never Used   Substance and Sexual Activity    Alcohol use: No     Comment: . 2 children. works at [employer].    Drug use: No    Sexual activity: Yes     Partners: Male   Lifestyle    Physical activity:     Days per week: Not on file     Minutes per session: Not on file    Stress: Not on file   Relationships    Social connections:     Talks on phone: Not on file     Gets together: Not on file     Attends Tenriism service: Not on file     Active member of club or organization: Not on file     Attends meetings of clubs or organizations: Not on file     Relationship status: Not on file   Other Topics Concern    Not on file   Social History Narrative    Not on file         Objective:       Vitals:    01/13/20 1530   BP: (!) 162/79   Pulse: 74   Resp: 18   Temp: 97.7 °F (36.5 °C)       Physical Exam   Constitutional: She is oriented to person, place, and time. She appears well-developed and well-nourished. No distress.   HENT:   Head: Normocephalic and atraumatic.   Nose: Nose normal.   Mouth/Throat: Oropharynx is clear and moist. No oropharyngeal exudate.   Eyes: Pupils are equal, round, and reactive to light. Conjunctivae and EOM are normal. Right eye exhibits no discharge. Left eye exhibits no discharge.   Neck: Normal range of motion. Neck supple. No tracheal deviation present.   Cardiovascular: Normal rate and regular rhythm. Exam reveals no gallop and no friction rub.   Murmur heard.  Pulmonary/Chest: Effort normal and breath sounds normal. No respiratory distress. She has no wheezes. She has no rales. No breast tenderness, discharge or bleeding.   Abdominal: Soft. Bowel sounds are normal. She exhibits no distension. There is no tenderness. There is no rebound and no guarding.   Musculoskeletal: Normal range of motion. She exhibits no edema or tenderness.    Neurological: She is alert and oriented to person, place, and time. She has normal reflexes. No cranial nerve deficit. Coordination normal.   Skin: Skin is warm and dry. No rash noted. She is not diaphoretic. No erythema. No pallor.   Psychiatric: She has a normal mood and affect. Her behavior is normal. Thought content normal.   Nursing note and vitals reviewed.      LABS:  WBC   Date Value Ref Range Status   01/13/2020 6.09 3.90 - 12.70 K/uL Final     Hemoglobin   Date Value Ref Range Status   01/13/2020 10.2 (L) 12.0 - 16.0 g/dL Final     Hematocrit   Date Value Ref Range Status   01/13/2020 33.8 (L) 37.0 - 48.5 % Final     Platelets   Date Value Ref Range Status   01/13/2020 141 (L) 150 - 350 K/uL Final       Chemistry        Component Value Date/Time     01/13/2020 1427    K 4.3 01/13/2020 1427     01/13/2020 1427    CO2 28 01/13/2020 1427    BUN 66 (H) 01/13/2020 1427    CREATININE 10.3 (H) 01/13/2020 1427    GLU 91 01/13/2020 1427        Component Value Date/Time    CALCIUM 9.3 01/13/2020 1427    ALKPHOS 96 01/13/2020 1427    AST 38 01/13/2020 1427    ALT 24 01/13/2020 1427    BILITOT 0.8 01/13/2020 1427            Assessment:       1. Metastatic renal cell carcinoma, left    2. Metastatic renal cell carcinoma to intra-abdominal site    3. End stage renal failure on dialysis    4. S/p nephrectomy left    5. Anemia due to end stage renal disease    6. Combined systolic and diastolic cardiac dysfunction    7. Hypertension, renal disease          Plan:       1,2,3,4 Metastatic Renal Cell Carcinoma:    - It is unlikely that surgery removed all sites of metastatic disease. Given that this is c/w her previous papillary disease, pt started on oral cabozantinib 60mg daily (for dual VEGF and MET inhibition), especially given recent data that shows cabo is far superior to student in the first line setting.  If pt progresses on this regimen, will switch to nivolumab.     Restaging scans show stable disease  and pt tolerating cabozantinib very well. We will continue cabozantinib for now. Patient has minimal side effects from medication. We will plan for scans every 4 months.    RTC in 8 weeks with labs (CBC,CMP) and to see me.      5- Mild. Monitor.    6,7- Per cards.    Patient was also seen and examined by Dr. Rivera. Patient is in agreement with the proposed treatment plan. All questions were answered to the patient's satisfaction. Pt knows to call clinic if anything is needed before the next clinic visit.    TNOA Telles-RODRÍGUEZ  Hematology and Medical Oncology        I have reviewed the notes, assessments, and/or procedures performed by the nurse practitioner, as above.  I have personally interviewed the patient at the beside, and rounded with the nurse practitioner. I formulated the plan of care.  I concur with her documentation of Eva Bloom.  I, Dr. Ben Rivera, personally spent more than 25 mins during this encounter, greater than 50% was spent in direct counseling and/or coordination of care.     Ben Rivera M.D., M.S., F.A.C.P.  Hematology/Oncology Attending  [facility]

## 2020-01-11 RX ORDER — DICYCLOMINE HYDROCHLORIDE 10 MG/1
10 CAPSULE ORAL
Qty: 30 CAPSULE | Refills: 0 | Status: SHIPPED | OUTPATIENT
Start: 2020-01-11 | End: 2020-01-21

## 2020-01-13 ENCOUNTER — HOSPITAL ENCOUNTER (OUTPATIENT)
Dept: RADIOLOGY | Facility: HOSPITAL | Age: 55
Discharge: HOME | End: 2020-01-13
Attending: NURSE PRACTITIONER
Payer: MEDICARE

## 2020-01-13 ENCOUNTER — OFFICE VISIT (OUTPATIENT)
Dept: HEMATOLOGY/ONCOLOGY | Facility: CLINIC | Age: 55
End: 2020-01-13
Payer: MEDICARE

## 2020-01-13 VITALS
BODY MASS INDEX: 21.68 KG/M2 | WEIGHT: 127 LBS | DIASTOLIC BLOOD PRESSURE: 79 MMHG | OXYGEN SATURATION: 96 % | HEIGHT: 64 IN | SYSTOLIC BLOOD PRESSURE: 162 MMHG | HEART RATE: 74 BPM | TEMPERATURE: 98 F | RESPIRATION RATE: 18 BRPM

## 2020-01-13 DIAGNOSIS — I51.89 COMBINED SYSTOLIC AND DIASTOLIC CARDIAC DYSFUNCTION: ICD-10-CM

## 2020-01-13 DIAGNOSIS — C64.9 METASTATIC RENAL CELL CARCINOMA TO INTRA-ABDOMINAL SITE: ICD-10-CM

## 2020-01-13 DIAGNOSIS — N18.6 END STAGE RENAL FAILURE ON DIALYSIS: ICD-10-CM

## 2020-01-13 DIAGNOSIS — C79.89 METASTATIC RENAL CELL CARCINOMA TO INTRA-ABDOMINAL SITE: ICD-10-CM

## 2020-01-13 DIAGNOSIS — Z90.5 S/P NEPHRECTOMY: ICD-10-CM

## 2020-01-13 DIAGNOSIS — D63.1 ANEMIA DUE TO END STAGE RENAL DISEASE: ICD-10-CM

## 2020-01-13 DIAGNOSIS — C64.2 METASTATIC RENAL CELL CARCINOMA, LEFT: ICD-10-CM

## 2020-01-13 DIAGNOSIS — N18.6 ANEMIA DUE TO END STAGE RENAL DISEASE: ICD-10-CM

## 2020-01-13 DIAGNOSIS — C64.2 METASTATIC RENAL CELL CARCINOMA, LEFT: Primary | ICD-10-CM

## 2020-01-13 DIAGNOSIS — I12.9 HYPERTENSION, RENAL DISEASE: ICD-10-CM

## 2020-01-13 DIAGNOSIS — Z99.2 END STAGE RENAL FAILURE ON DIALYSIS: ICD-10-CM

## 2020-01-13 PROCEDURE — 71250 CT THORAX DX C-: CPT | Mod: 26,,, | Performed by: RADIOLOGY

## 2020-01-13 PROCEDURE — 71250 CT CHEST WITHOUT CONTRAST: ICD-10-PCS | Mod: 26,,, | Performed by: RADIOLOGY

## 2020-01-13 PROCEDURE — 99214 OFFICE O/P EST MOD 30 MIN: CPT | Mod: S$PBB,,, | Performed by: INTERNAL MEDICINE

## 2020-01-13 PROCEDURE — 74181 MRI ABDOMEN W/O CONTRAST: CPT | Mod: TC

## 2020-01-13 PROCEDURE — 99999 PR PBB SHADOW E&M-EST. PATIENT-LVL IV: CPT | Mod: PBBFAC,,, | Performed by: INTERNAL MEDICINE

## 2020-01-13 PROCEDURE — 99214 PR OFFICE/OUTPT VISIT, EST, LEVL IV, 30-39 MIN: ICD-10-PCS | Mod: S$PBB,,, | Performed by: INTERNAL MEDICINE

## 2020-01-13 PROCEDURE — 71250 CT THORAX DX C-: CPT | Mod: TC

## 2020-01-13 PROCEDURE — 99214 OFFICE O/P EST MOD 30 MIN: CPT | Mod: PBBFAC,25 | Performed by: INTERNAL MEDICINE

## 2020-01-13 PROCEDURE — 74181 MRI ABDOMEN W/O CONTRAST: CPT | Mod: 26,,, | Performed by: RADIOLOGY

## 2020-01-13 PROCEDURE — 74181 MRI ABDOMEN WITHOUT CONTRAST: ICD-10-PCS | Mod: 26,,, | Performed by: RADIOLOGY

## 2020-01-13 PROCEDURE — 99999 PR PBB SHADOW E&M-EST. PATIENT-LVL IV: ICD-10-PCS | Mod: PBBFAC,,, | Performed by: INTERNAL MEDICINE

## 2020-02-06 ENCOUNTER — TELEPHONE (OUTPATIENT)
Dept: HEMATOLOGY/ONCOLOGY | Facility: CLINIC | Age: 55
End: 2020-02-06

## 2020-02-06 ENCOUNTER — TELEPHONE (OUTPATIENT)
Dept: PHARMACY | Facility: CLINIC | Age: 55
End: 2020-02-06

## 2020-02-06 DIAGNOSIS — C64.9 METASTATIC RENAL CELL CARCINOMA, UNSPECIFIED LATERALITY: ICD-10-CM

## 2020-02-06 NOTE — TELEPHONE ENCOUNTER
----- Message from Jean Paul Rios sent at 2/6/2020  8:19 AM CST -----  Contact: [facility]  Staff Message     Caller name: [facility]    Reason for call: Pharmacy needs the pt insurance info to fill the prescription on Rx cabozantinib (CABOMETYX) 60 mg Tab        Communication Preference: [phone]    Additional Information:

## 2020-02-06 NOTE — TELEPHONE ENCOUNTER
Liset JEAN's office received correspondence from [facility] requesting information regarding Cabometyx. Her insurance previously required her to fill with [facility] so we are attempted to determine what pharmacy she should be filling with.    LVM on the patient's home and mobile numbers to attempt to obtain updated insurance information.

## 2020-02-10 NOTE — TELEPHONE ENCOUNTER
Contacted patient to see if she had changed her insurance and to see how much Cabometyx she has on hand. The patient stated she has been receiving the Cabometyx from [facility] and has not had any issues with the filling with them, she stated they just call her to refill the Cabometyx and she has 2 weeks on hand at the moment. She stated she was going to call them to see if they are having issues and will call me back @ [facility] to let me know. She stated she has not changed insurance. Cabometyx has a PA on file until 03/14/2020.- [place]

## 2020-02-11 DIAGNOSIS — I12.0 HYPERTENSIVE CKD, ESRD ON DIALYSIS: Primary | ICD-10-CM

## 2020-02-11 DIAGNOSIS — N18.6 HYPERTENSIVE CKD, ESRD ON DIALYSIS: Primary | ICD-10-CM

## 2020-02-11 DIAGNOSIS — Z99.2 HYPERTENSIVE CKD, ESRD ON DIALYSIS: Primary | ICD-10-CM

## 2020-02-11 RX ORDER — LIDOCAINE AND PRILOCAINE 25; 25 MG/G; MG/G
CREAM TOPICAL
Qty: 30 G | Refills: 11 | Status: SHIPPED | OUTPATIENT
Start: 2020-02-11

## 2020-02-14 NOTE — TELEPHONE ENCOUNTER
DOCUMENTATION ONLY:  Prior authorization for Cabometyx 60 mg Tablet #30/30 approved from 02/13/2020 to 02/13/2021  Case ID: [id number]    Co-pay: $0.00 with co-pay card    Patient has been receiving medication @ [facility]. - [place]

## 2020-03-02 RX ORDER — OLMESARTAN MEDOXOMIL 40 MG/1
TABLET ORAL
Qty: 90 TABLET | Refills: 3 | Status: SHIPPED | OUTPATIENT
Start: 2020-03-02 | End: 2021-02-26 | Stop reason: ALTCHOICE

## 2020-03-10 DIAGNOSIS — I42.9 CARDIOMYOPATHY: ICD-10-CM

## 2020-03-10 DIAGNOSIS — I42.9 CARDIOMYOPATHY, UNSPECIFIED TYPE: Primary | ICD-10-CM

## 2020-03-11 ENCOUNTER — TELEPHONE (OUTPATIENT)
Dept: HEMATOLOGY/ONCOLOGY | Facility: CLINIC | Age: 55
End: 2020-03-11

## 2020-03-12 ENCOUNTER — TELEPHONE (OUTPATIENT)
Dept: HEMATOLOGY/ONCOLOGY | Facility: CLINIC | Age: 55
End: 2020-03-12

## 2020-03-12 NOTE — TELEPHONE ENCOUNTER
Spoke to patient she is feeling well, lab appointment moved to  tomorrow. Will call with results and reschedule appointment depending on labs. Most likely in 8 weeks. She knows to call if she needs to be seen sooner.

## 2020-03-13 ENCOUNTER — TELEPHONE (OUTPATIENT)
Dept: HEMATOLOGY/ONCOLOGY | Facility: CLINIC | Age: 55
End: 2020-03-13

## 2020-03-13 ENCOUNTER — LAB VISIT (OUTPATIENT)
Dept: LAB | Facility: HOSPITAL | Age: 55
End: 2020-03-13
Attending: INTERNAL MEDICINE
Payer: MEDICARE

## 2020-03-13 DIAGNOSIS — E04.1 THYROID NODULE: ICD-10-CM

## 2020-03-13 DIAGNOSIS — N18.9 ACUTE ON CHRONIC KIDNEY FAILURE: ICD-10-CM

## 2020-03-13 DIAGNOSIS — C64.2 MALIGNANT NEOPLASM OF LEFT KIDNEY: ICD-10-CM

## 2020-03-13 DIAGNOSIS — C64.9 METASTATIC RENAL CELL CARCINOMA, UNSPECIFIED LATERALITY: Primary | ICD-10-CM

## 2020-03-13 DIAGNOSIS — N18.4 CKD (CHRONIC KIDNEY DISEASE) STAGE 4, GFR 15-29 ML/MIN: ICD-10-CM

## 2020-03-13 DIAGNOSIS — N17.9 ACUTE ON CHRONIC KIDNEY FAILURE: ICD-10-CM

## 2020-03-13 LAB
ALBUMIN SERPL BCP-MCNC: 3.2 G/DL (ref 3.5–5.2)
ALP SERPL-CCNC: 124 U/L (ref 55–135)
ALT SERPL W/O P-5'-P-CCNC: 60 U/L (ref 10–44)
ANION GAP SERPL CALC-SCNC: 13 MMOL/L (ref 8–16)
AST SERPL-CCNC: 63 U/L (ref 10–40)
BASOPHILS # BLD AUTO: 0.08 K/UL (ref 0–0.2)
BASOPHILS NFR BLD: 1.6 % (ref 0–1.9)
BILIRUB SERPL-MCNC: 0.7 MG/DL (ref 0.1–1)
BUN SERPL-MCNC: 43 MG/DL (ref 6–20)
CALCIUM SERPL-MCNC: 9.8 MG/DL (ref 8.7–10.5)
CHLORIDE SERPL-SCNC: 102 MMOL/L (ref 95–110)
CO2 SERPL-SCNC: 27 MMOL/L (ref 23–29)
CREAT SERPL-MCNC: 7.7 MG/DL (ref 0.5–1.4)
DIFFERENTIAL METHOD: ABNORMAL
EOSINOPHIL # BLD AUTO: 0.3 K/UL (ref 0–0.5)
EOSINOPHIL NFR BLD: 5.6 % (ref 0–8)
ERYTHROCYTE [DISTWIDTH] IN BLOOD BY AUTOMATED COUNT: 16.1 % (ref 11.5–14.5)
EST. GFR  (AFRICAN AMERICAN): 6 ML/MIN/1.73 M^2
EST. GFR  (NON AFRICAN AMERICAN): 5 ML/MIN/1.73 M^2
GLUCOSE SERPL-MCNC: 106 MG/DL (ref 70–110)
HCT VFR BLD AUTO: 37.2 % (ref 37–48.5)
HGB BLD-MCNC: 11.1 G/DL (ref 12–16)
LYMPHOCYTES # BLD AUTO: 1.8 K/UL (ref 1–4.8)
LYMPHOCYTES NFR BLD: 33.9 % (ref 18–48)
MCH RBC QN AUTO: 32.1 PG (ref 27–31)
MCHC RBC AUTO-ENTMCNC: 29.8 G/DL (ref 32–36)
MCV RBC AUTO: 108 FL (ref 82–98)
MONOCYTES # BLD AUTO: 0.6 K/UL (ref 0.3–1)
MONOCYTES NFR BLD: 11.6 % (ref 4–15)
NEUTROPHILS # BLD AUTO: 2.4 K/UL (ref 1.8–7.7)
NEUTROPHILS NFR BLD: 47.1 % (ref 38–73)
NRBC BLD-RTO: 0 /100 WBC
PLATELET # BLD AUTO: 153 K/UL (ref 150–350)
PMV BLD AUTO: 10.7 FL (ref 9.2–12.9)
POTASSIUM SERPL-SCNC: 4.4 MMOL/L (ref 3.5–5.1)
PROT SERPL-MCNC: 6.6 G/DL (ref 6–8.4)
RBC # BLD AUTO: 3.46 M/UL (ref 4–5.4)
SODIUM SERPL-SCNC: 142 MMOL/L (ref 136–145)
WBC # BLD AUTO: 5.16 K/UL (ref 3.9–12.7)

## 2020-03-13 PROCEDURE — 85025 COMPLETE CBC W/AUTO DIFF WBC: CPT | Mod: PO

## 2020-03-13 PROCEDURE — 80053 COMPREHEN METABOLIC PANEL: CPT

## 2020-03-13 PROCEDURE — 36415 COLL VENOUS BLD VENIPUNCTURE: CPT | Mod: PO

## 2020-03-14 ENCOUNTER — HOSPITAL ENCOUNTER (OUTPATIENT)
Facility: HOSPITAL | Age: 55
Discharge: HOME | End: 2020-03-16
Attending: EMERGENCY MEDICINE | Admitting: EMERGENCY MEDICINE
Payer: MEDICARE

## 2020-03-14 DIAGNOSIS — R06.02 SHORTNESS OF BREATH: ICD-10-CM

## 2020-03-14 DIAGNOSIS — I10 ESSENTIAL HYPERTENSION: ICD-10-CM

## 2020-03-14 DIAGNOSIS — Z51.11 ENCOUNTER FOR CHEMOTHERAPY MANAGEMENT: ICD-10-CM

## 2020-03-14 DIAGNOSIS — Z90.5 S/P NEPHRECTOMY: ICD-10-CM

## 2020-03-14 DIAGNOSIS — Z20.822 SUSPECTED COVID-19 VIRUS INFECTION: Primary | ICD-10-CM

## 2020-03-14 DIAGNOSIS — Z90.721 HISTORY OF RIGHT OOPHORECTOMY: ICD-10-CM

## 2020-03-14 DIAGNOSIS — N18.6 ANEMIA IN ESRD (END-STAGE RENAL DISEASE): ICD-10-CM

## 2020-03-14 DIAGNOSIS — Z99.2 ESRD ON HEMODIALYSIS: ICD-10-CM

## 2020-03-14 DIAGNOSIS — E04.1 THYROID NODULE: ICD-10-CM

## 2020-03-14 DIAGNOSIS — C64.2 METASTATIC RENAL CELL CARCINOMA, LEFT: ICD-10-CM

## 2020-03-14 DIAGNOSIS — D63.1 ANEMIA IN ESRD (END-STAGE RENAL DISEASE): ICD-10-CM

## 2020-03-14 DIAGNOSIS — Z99.2 ESRD (END STAGE RENAL DISEASE) ON DIALYSIS: ICD-10-CM

## 2020-03-14 DIAGNOSIS — N18.6 ESRD ON HEMODIALYSIS: ICD-10-CM

## 2020-03-14 DIAGNOSIS — C78.6 MALIGNANT NEOPLASM METASTATIC TO PERITONEUM: ICD-10-CM

## 2020-03-14 DIAGNOSIS — I12.9 HYPERTENSIVE KIDNEY DISEASE WITH CHRONIC KIDNEY DISEASE, STAGE 1-4 OR UNSPECIFIED CHRONIC KIDNEY DISEASE: ICD-10-CM

## 2020-03-14 DIAGNOSIS — R06.02 SOB (SHORTNESS OF BREATH): ICD-10-CM

## 2020-03-14 DIAGNOSIS — I16.1 HYPERTENSIVE EMERGENCY: ICD-10-CM

## 2020-03-14 DIAGNOSIS — N18.6 ESRD (END STAGE RENAL DISEASE) ON DIALYSIS: ICD-10-CM

## 2020-03-14 DIAGNOSIS — I50.23 ACUTE ON CHRONIC SYSTOLIC CHF (CONGESTIVE HEART FAILURE): ICD-10-CM

## 2020-03-14 DIAGNOSIS — R93.1 ABNORMAL ECHOCARDIOGRAM: ICD-10-CM

## 2020-03-14 DIAGNOSIS — R07.9 CHEST PAIN: ICD-10-CM

## 2020-03-14 PROBLEM — R05.9 COUGH: Status: ACTIVE | Noted: 2020-03-14

## 2020-03-14 LAB
ADENOVIRUS: NOT DETECTED
ANION GAP SERPL CALC-SCNC: 13 MMOL/L (ref 8–16)
APTT BLDCRRT: 23.9 SEC (ref 21–32)
BASOPHILS # BLD AUTO: 0.06 K/UL (ref 0–0.2)
BASOPHILS NFR BLD: 1.2 % (ref 0–1.9)
BNP SERPL-MCNC: >4900 PG/ML (ref 0–99)
BORDETELLA PARAPERTUSSIS (IS1001): NOT DETECTED
BORDETELLA PERTUSSIS (PTXP): NOT DETECTED
BUN SERPL-MCNC: 63 MG/DL (ref 6–20)
CALCIUM SERPL-MCNC: 9.9 MG/DL (ref 8.7–10.5)
CHLAMYDIA PNEUMONIAE: NOT DETECTED
CHLORIDE SERPL-SCNC: 102 MMOL/L (ref 95–110)
CO2 SERPL-SCNC: 27 MMOL/L (ref 23–29)
CORONAVIRUS 229E, COMMON COLD VIRUS: NOT DETECTED
CORONAVIRUS HKU1, COMMON COLD VIRUS: NOT DETECTED
CORONAVIRUS NL63, COMMON COLD VIRUS: NOT DETECTED
CORONAVIRUS OC43, COMMON COLD VIRUS: NOT DETECTED
CREAT SERPL-MCNC: 9.2 MG/DL (ref 0.5–1.4)
CRP SERPL-MCNC: 3.5 MG/L (ref 0–8.2)
DIFFERENTIAL METHOD: ABNORMAL
EOSINOPHIL # BLD AUTO: 0.4 K/UL (ref 0–0.5)
EOSINOPHIL NFR BLD: 8.1 % (ref 0–8)
ERYTHROCYTE [DISTWIDTH] IN BLOOD BY AUTOMATED COUNT: 16.1 % (ref 11.5–14.5)
ERYTHROCYTE [SEDIMENTATION RATE] IN BLOOD BY WESTERGREN METHOD: 7 MM/HR (ref 0–36)
EST. GFR  (AFRICAN AMERICAN): 5 ML/MIN/1.73 M^2
EST. GFR  (NON AFRICAN AMERICAN): 4.3 ML/MIN/1.73 M^2
FERRITIN SERPL-MCNC: 4699 NG/ML (ref 20–300)
FLUBV RNA NPH QL NAA+NON-PROBE: NOT DETECTED
GLUCOSE SERPL-MCNC: 95 MG/DL (ref 70–110)
HCT VFR BLD AUTO: 35.4 % (ref 37–48.5)
HGB BLD-MCNC: 10.9 G/DL (ref 12–16)
HPIV1 RNA NPH QL NAA+NON-PROBE: NOT DETECTED
HPIV2 RNA NPH QL NAA+NON-PROBE: NOT DETECTED
HPIV3 RNA NPH QL NAA+NON-PROBE: NOT DETECTED
HPIV4 RNA NPH QL NAA+NON-PROBE: NOT DETECTED
HUMAN METAPNEUMOVIRUS: NOT DETECTED
IMM GRANULOCYTES # BLD AUTO: 0.01 K/UL (ref 0–0.04)
IMM GRANULOCYTES NFR BLD AUTO: 0.2 % (ref 0–0.5)
INFLUENZA A (SUBTYPES H1,H1-2009,H3): NOT DETECTED
INFLUENZA A, MOLECULAR: NEGATIVE
INFLUENZA B, MOLECULAR: NEGATIVE
INR PPP: 1.1 (ref 0.8–1.2)
LYMPHOCYTES # BLD AUTO: 1.7 K/UL (ref 1–4.8)
LYMPHOCYTES NFR BLD: 34.7 % (ref 18–48)
MCH RBC QN AUTO: 32.5 PG (ref 27–31)
MCHC RBC AUTO-ENTMCNC: 30.8 G/DL (ref 32–36)
MCV RBC AUTO: 106 FL (ref 82–98)
MONOCYTES # BLD AUTO: 0.6 K/UL (ref 0.3–1)
MONOCYTES NFR BLD: 11.9 % (ref 4–15)
MYCOPLASMA PNEUMONIAE: NOT DETECTED
NEUTROPHILS # BLD AUTO: 2.2 K/UL (ref 1.8–7.7)
NEUTROPHILS NFR BLD: 43.9 % (ref 38–73)
NRBC BLD-RTO: 0 /100 WBC
PLATELET # BLD AUTO: 168 K/UL (ref 150–350)
PMV BLD AUTO: 11.3 FL (ref 9.2–12.9)
POTASSIUM SERPL-SCNC: 4.5 MMOL/L (ref 3.5–5.1)
PROCALCITONIN SERPL IA-MCNC: 0.61 NG/ML
PROTHROMBIN TIME: 10.9 SEC (ref 9–12.5)
RBC # BLD AUTO: 3.35 M/UL (ref 4–5.4)
RESPIRATORY INFECTION PANEL SOURCE: ABNORMAL
RSV RNA NPH QL NAA+NON-PROBE: NOT DETECTED
RV+EV RNA NPH QL NAA+NON-PROBE: DETECTED
SODIUM SERPL-SCNC: 142 MMOL/L (ref 136–145)
SPECIMEN SOURCE: NORMAL
TROPONIN I SERPL DL<=0.01 NG/ML-MCNC: 0.11 NG/ML (ref 0–0.03)
TROPONIN I SERPL DL<=0.01 NG/ML-MCNC: 0.12 NG/ML (ref 0–0.03)
WBC # BLD AUTO: 4.96 K/UL (ref 3.9–12.7)

## 2020-03-14 PROCEDURE — 93005 ELECTROCARDIOGRAM TRACING: CPT

## 2020-03-14 PROCEDURE — 84145 PROCALCITONIN (PCT): CPT

## 2020-03-14 PROCEDURE — 80048 BASIC METABOLIC PNL TOTAL CA: CPT

## 2020-03-14 PROCEDURE — 99285 EMERGENCY DEPT VISIT HI MDM: CPT | Mod: 25

## 2020-03-14 PROCEDURE — 85610 PROTHROMBIN TIME: CPT

## 2020-03-14 PROCEDURE — 96361 HYDRATE IV INFUSION ADD-ON: CPT

## 2020-03-14 PROCEDURE — G0378 HOSPITAL OBSERVATION PER HR: HCPCS

## 2020-03-14 PROCEDURE — 99220 PR INITIAL OBSERVATION CARE,LEVL III: CPT | Mod: ,,, | Performed by: PHYSICIAN ASSISTANT

## 2020-03-14 PROCEDURE — 99205 OFFICE O/P NEW HI 60 MIN: CPT | Mod: ,,, | Performed by: NURSE PRACTITIONER

## 2020-03-14 PROCEDURE — 25000003 PHARM REV CODE 250: Performed by: PHYSICIAN ASSISTANT

## 2020-03-14 PROCEDURE — 85652 RBC SED RATE AUTOMATED: CPT

## 2020-03-14 PROCEDURE — G0257 UNSCHED DIALYSIS ESRD PT HOS: HCPCS

## 2020-03-14 PROCEDURE — 63600175 PHARM REV CODE 636 W HCPCS: Performed by: EMERGENCY MEDICINE

## 2020-03-14 PROCEDURE — 96375 TX/PRO/DX INJ NEW DRUG ADDON: CPT | Mod: 59

## 2020-03-14 PROCEDURE — 82728 ASSAY OF FERRITIN: CPT

## 2020-03-14 PROCEDURE — 85025 COMPLETE CBC W/AUTO DIFF WBC: CPT

## 2020-03-14 PROCEDURE — 96374 THER/PROPH/DIAG INJ IV PUSH: CPT | Mod: 59

## 2020-03-14 PROCEDURE — 83880 ASSAY OF NATRIURETIC PEPTIDE: CPT

## 2020-03-14 PROCEDURE — 99220 PR INITIAL OBSERVATION CARE,LEVL III: ICD-10-PCS | Mod: ,,, | Performed by: PHYSICIAN ASSISTANT

## 2020-03-14 PROCEDURE — 84484 ASSAY OF TROPONIN QUANT: CPT

## 2020-03-14 PROCEDURE — 87798 DETECT AGENT NOS DNA AMP: CPT

## 2020-03-14 PROCEDURE — 99205 PR OFFICE/OUTPT VISIT, NEW, LEVL V, 60-74 MIN: ICD-10-PCS | Mod: ,,, | Performed by: NURSE PRACTITIONER

## 2020-03-14 PROCEDURE — 96372 THER/PROPH/DIAG INJ SC/IM: CPT | Mod: 59

## 2020-03-14 PROCEDURE — 63600175 PHARM REV CODE 636 W HCPCS: Performed by: NURSE PRACTITIONER

## 2020-03-14 PROCEDURE — 86140 C-REACTIVE PROTEIN: CPT

## 2020-03-14 PROCEDURE — 80100014 HC HEMODIALYSIS 1:1

## 2020-03-14 PROCEDURE — 63600175 PHARM REV CODE 636 W HCPCS: Performed by: PHYSICIAN ASSISTANT

## 2020-03-14 PROCEDURE — 99284 PR EMERGENCY DEPT VISIT,LEVEL IV: ICD-10-PCS | Mod: ,,, | Performed by: EMERGENCY MEDICINE

## 2020-03-14 PROCEDURE — 36415 COLL VENOUS BLD VENIPUNCTURE: CPT

## 2020-03-14 PROCEDURE — 63600175 PHARM REV CODE 636 W HCPCS: Performed by: HOSPITALIST

## 2020-03-14 PROCEDURE — 87502 INFLUENZA DNA AMP PROBE: CPT | Mod: 59

## 2020-03-14 PROCEDURE — 84484 ASSAY OF TROPONIN QUANT: CPT | Mod: 91

## 2020-03-14 PROCEDURE — 93010 ELECTROCARDIOGRAM REPORT: CPT | Mod: ,,, | Performed by: INTERNAL MEDICINE

## 2020-03-14 PROCEDURE — 25000003 PHARM REV CODE 250: Performed by: INTERNAL MEDICINE

## 2020-03-14 PROCEDURE — 99284 EMERGENCY DEPT VISIT MOD MDM: CPT | Mod: ,,, | Performed by: EMERGENCY MEDICINE

## 2020-03-14 PROCEDURE — 93010 EKG 12-LEAD: ICD-10-PCS | Mod: ,,, | Performed by: INTERNAL MEDICINE

## 2020-03-14 PROCEDURE — 87040 BLOOD CULTURE FOR BACTERIA: CPT

## 2020-03-14 PROCEDURE — 85730 THROMBOPLASTIN TIME PARTIAL: CPT

## 2020-03-14 RX ORDER — IBUPROFEN 200 MG
24 TABLET ORAL
Status: DISCONTINUED | OUTPATIENT
Start: 2020-03-14 | End: 2020-03-16 | Stop reason: HOSPADM

## 2020-03-14 RX ORDER — GLUCAGON 1 MG
1 KIT INJECTION
Status: DISCONTINUED | OUTPATIENT
Start: 2020-03-14 | End: 2020-03-16 | Stop reason: HOSPADM

## 2020-03-14 RX ORDER — ISOSORBIDE MONONITRATE 30 MG/1
30 TABLET, EXTENDED RELEASE ORAL DAILY
Status: DISCONTINUED | OUTPATIENT
Start: 2020-03-14 | End: 2020-03-16 | Stop reason: HOSPADM

## 2020-03-14 RX ORDER — HYDRALAZINE HYDROCHLORIDE 50 MG/1
100 TABLET, FILM COATED ORAL EVERY 12 HOURS
Status: DISCONTINUED | OUTPATIENT
Start: 2020-03-14 | End: 2020-03-16 | Stop reason: HOSPADM

## 2020-03-14 RX ORDER — SODIUM CHLORIDE 9 MG/ML
INJECTION, SOLUTION INTRAVENOUS ONCE
Status: COMPLETED | OUTPATIENT
Start: 2020-03-14 | End: 2020-03-14

## 2020-03-14 RX ORDER — BENZONATATE 100 MG/1
200 CAPSULE ORAL 3 TIMES DAILY PRN
Status: DISCONTINUED | OUTPATIENT
Start: 2020-03-14 | End: 2020-03-16 | Stop reason: HOSPADM

## 2020-03-14 RX ORDER — AMLODIPINE BESYLATE 5 MG/1
5 TABLET ORAL DAILY
Status: DISCONTINUED | OUTPATIENT
Start: 2020-03-14 | End: 2020-03-14

## 2020-03-14 RX ORDER — ALBUTEROL SULFATE 90 UG/1
1 AEROSOL, METERED RESPIRATORY (INHALATION) EVERY 4 HOURS PRN
Status: DISCONTINUED | OUTPATIENT
Start: 2020-03-14 | End: 2020-03-16 | Stop reason: HOSPADM

## 2020-03-14 RX ORDER — HYDRALAZINE HYDROCHLORIDE 20 MG/ML
10 INJECTION INTRAMUSCULAR; INTRAVENOUS
Status: COMPLETED | OUTPATIENT
Start: 2020-03-14 | End: 2020-03-14

## 2020-03-14 RX ORDER — AMLODIPINE BESYLATE 10 MG/1
10 TABLET ORAL DAILY
Status: DISCONTINUED | OUTPATIENT
Start: 2020-03-15 | End: 2020-03-16 | Stop reason: HOSPADM

## 2020-03-14 RX ORDER — IPRATROPIUM BROMIDE AND ALBUTEROL SULFATE 2.5; .5 MG/3ML; MG/3ML
3 SOLUTION RESPIRATORY (INHALATION) EVERY 4 HOURS PRN
Status: DISCONTINUED | OUTPATIENT
Start: 2020-03-14 | End: 2020-03-14

## 2020-03-14 RX ORDER — HEPARIN SODIUM 5000 [USP'U]/ML
5000 INJECTION, SOLUTION INTRAVENOUS; SUBCUTANEOUS EVERY 8 HOURS
Status: DISCONTINUED | OUTPATIENT
Start: 2020-03-14 | End: 2020-03-16 | Stop reason: HOSPADM

## 2020-03-14 RX ORDER — SEVELAMER CARBONATE 800 MG/1
1600 TABLET, FILM COATED ORAL
Status: DISCONTINUED | OUTPATIENT
Start: 2020-03-14 | End: 2020-03-16 | Stop reason: HOSPADM

## 2020-03-14 RX ORDER — POLYETHYLENE GLYCOL 3350 17 G/17G
17 POWDER, FOR SOLUTION ORAL 2 TIMES DAILY PRN
Status: DISCONTINUED | OUTPATIENT
Start: 2020-03-14 | End: 2020-03-16 | Stop reason: HOSPADM

## 2020-03-14 RX ORDER — IBUPROFEN 200 MG
16 TABLET ORAL
Status: DISCONTINUED | OUTPATIENT
Start: 2020-03-14 | End: 2020-03-16 | Stop reason: HOSPADM

## 2020-03-14 RX ORDER — ONDANSETRON 8 MG/1
8 TABLET, ORALLY DISINTEGRATING ORAL EVERY 8 HOURS PRN
Status: DISCONTINUED | OUTPATIENT
Start: 2020-03-14 | End: 2020-03-14

## 2020-03-14 RX ORDER — LIDOCAINE AND PRILOCAINE 25; 25 MG/G; MG/G
CREAM TOPICAL
Status: DISCONTINUED | OUTPATIENT
Start: 2020-03-14 | End: 2020-03-16 | Stop reason: HOSPADM

## 2020-03-14 RX ORDER — METOPROLOL SUCCINATE 50 MG/1
50 TABLET, EXTENDED RELEASE ORAL DAILY
Status: DISCONTINUED | OUTPATIENT
Start: 2020-03-14 | End: 2020-03-16 | Stop reason: HOSPADM

## 2020-03-14 RX ORDER — CLONIDINE 0.2 MG/24H
1 PATCH, EXTENDED RELEASE TRANSDERMAL
Status: DISCONTINUED | OUTPATIENT
Start: 2020-03-14 | End: 2020-03-16 | Stop reason: HOSPADM

## 2020-03-14 RX ORDER — OLMESARTAN MEDOXOMIL 40 MG/1
40 TABLET ORAL DAILY
Status: DISCONTINUED | OUTPATIENT
Start: 2020-03-14 | End: 2020-03-16 | Stop reason: HOSPADM

## 2020-03-14 RX ORDER — ACETAMINOPHEN 325 MG/1
650 TABLET ORAL EVERY 4 HOURS PRN
Status: DISCONTINUED | OUTPATIENT
Start: 2020-03-14 | End: 2020-03-16 | Stop reason: HOSPADM

## 2020-03-14 RX ORDER — BISACODYL 10 MG
10 SUPPOSITORY, RECTAL RECTAL DAILY PRN
Status: DISCONTINUED | OUTPATIENT
Start: 2020-03-14 | End: 2020-03-16 | Stop reason: HOSPADM

## 2020-03-14 RX ADMIN — CEFTRIAXONE 2 G: 2 INJECTION, SOLUTION INTRAVENOUS at 09:03

## 2020-03-14 RX ADMIN — NITROGLYCERIN 1 INCH: 20 OINTMENT TOPICAL at 08:03

## 2020-03-14 RX ADMIN — HYDRALAZINE HYDROCHLORIDE 100 MG: 50 TABLET ORAL at 12:03

## 2020-03-14 RX ADMIN — AZITHROMYCIN DIHYDRATE 500 MG: 500 INJECTION, POWDER, LYOPHILIZED, FOR SOLUTION INTRAVENOUS at 08:03

## 2020-03-14 RX ADMIN — AMLODIPINE BESYLATE 5 MG: 5 TABLET ORAL at 12:03

## 2020-03-14 RX ADMIN — ISOSORBIDE MONONITRATE 30 MG: 30 TABLET, EXTENDED RELEASE ORAL at 12:03

## 2020-03-14 RX ADMIN — SODIUM CHLORIDE: 0.9 INJECTION, SOLUTION INTRAVENOUS at 09:03

## 2020-03-14 RX ADMIN — HYDRALAZINE HYDROCHLORIDE 10 MG: 20 INJECTION INTRAMUSCULAR; INTRAVENOUS at 08:03

## 2020-03-14 RX ADMIN — OLMESARTAN MEDOXOMIL 40 MG: 40 TABLET, FILM COATED ORAL at 12:03

## 2020-03-14 RX ADMIN — METOPROLOL SUCCINATE 50 MG: 50 TABLET, EXTENDED RELEASE ORAL at 12:03

## 2020-03-14 RX ADMIN — HYDRALAZINE HYDROCHLORIDE 100 MG: 50 TABLET ORAL at 09:03

## 2020-03-14 RX ADMIN — HEPARIN SODIUM 5000 UNITS: 5000 INJECTION, SOLUTION INTRAVENOUS; SUBCUTANEOUS at 09:03

## 2020-03-14 NOTE — PROGRESS NOTES
Received pt to [facility] via stretcher, alert and oriented x4.  Started dialysis via left arm graft with 15 gauge needles.  Good blood flow, positive thrill & bruit noted. Lines secured and taped.  Pt tolerated without difficulty.

## 2020-03-14 NOTE — CONSULTS
[facility]  Nephrology  Consult Note    Patient Name: Eva Bloom  MRN: [id number]  Admission Date: 3/14/2020  Hospital Length of Stay: 0 days  Attending Provider: Alison Trujillo MD   Primary Care Physician: Sowmya Mccain MD  Principal Problem:Hypertensive emergency    Inpatient consult to Nephrology  Consult performed by: Jose Elias Brock NP  Consult ordered by: MANPREET Barker MD  Reason for consult: Management of ESRD and HD         Subjective:     HPI: Ms Bloom is a 54 y.o. female  with a history of ESRD (HD TTS), who presents with c/o SOB  x1 week. She endorses increased difficulty breathing when laying flat. She is being admitted to obs for treatment of hypertensive urgency. She denies fever, CP, cough, aches/chills, N/V/D, abdominal pain, HA, dizziness, and swelling to her lower extremities. She is anuric. She was last dialyzed on Thursday. Nephrology has been consulted for management of ESRD and HD.     -HPI was obtained from patient interview and from review of the patient's EMR.      Past Medical History:   Diagnosis Date    Anemia     Bronchitis 03/01/2017    Cancer 2016    kidney cancer    CMV (cytomegalovirus) antibody positive     Essential hypertension 9/23/2015    H/O herpes simplex type 2 infection     Herpes simplex type 1 antibody positive     History of kidney cancer     s/p left nephrectomy 1/2016    Hyperparathyroidism, unspecified     Hyperuricemia without signs of inflammatory arthritis and tophaceous disease     Kidney stones     LGSIL (low grade squamous intraepithelial dysplasia)     Myocardiopathy 7/21/2017    Prediabetes     Proteinuria     Renal disorder     Thyroid nodule     Urate nephropathy        Past Surgical History:   Procedure Laterality Date    BREAST CYST EXCISION      COLONOSCOPY N/A 11/12/2015    NEPHRECTOMY-LAPAROSCOPIC Left 01/12/2016    PERITONEAL CATHETER INSERTION      Permacath insertion  01/12/2017     SALPINGOOPHORECTOMY Right 2016    KJB---DAVINCI    TONSILLECTOMY      TUBAL LIGATION         Review of patient's allergies indicates:   Allergen Reactions    Coreg [carvedilol] Other (See Comments)     Nausea/vomiting    Allopurinol      Other reaction(s): abnormal transaminases     Current Facility-Administered Medications   Medication Frequency    0.9%  NaCl infusion Once    acetaminophen tablet 650 mg Q4H PRN    albuterol-ipratropium 2.5 mg-0.5 mg/3 mL nebulizer solution 3 mL Q4H PRN    bisacodyL suppository 10 mg Daily PRN    dextrose 10% (D10W) Bolus PRN    dextrose 10% (D10W) Bolus PRN    glucagon (human recombinant) injection 1 mg PRN    glucose chewable tablet 16 g PRN    glucose chewable tablet 24 g PRN    heparin (porcine) injection 5,000 Units Q8H    polyethylene glycol packet 17 g BID PRN    promethazine (PHENERGAN) 12.5 mg in dextrose 5 % 50 mL IVPB Q6H PRN     Current Outpatient Medications   Medication    acetaminophen (TYLENOL) 500 MG tablet    albuterol (PROVENTIL/VENTOLIN HFA) 90 mcg/actuation inhaler    aluminum-magnesium hydroxide-simethicone (MAALOX) 200-200-20 mg/5 mL Susp    amLODIPine (NORVASC) 5 MG tablet    benzonatate (TESSALON) 200 MG capsule    cabozantinib (CABOMETYX) 60 mg Tab    cloNIDine 0.2 mg/24 hr td ptwk (CATAPRES) 0.2 mg/24 hr    febuxostat (ULORIC) 40 mg Tab    hydrALAZINE (APRESOLINE) 100 MG tablet    inhalation spacing device    isosorbide mononitrate (IMDUR) 30 MG 24 hr tablet    lidocaine-prilocaine (EMLA) cream    metoprolol succinate (TOPROL-XL) 50 MG 24 hr tablet    multivitamin-Ca-iron-minerals (ONE-A-DAY WOMENS FORMULA) 27-0.4 mg Tab    olmesartan (BENICAR) 40 MG tablet    ondansetron (ZOFRAN-ODT) 8 MG TbDL    psyllium husk-calcium (METAMUCIL PLUS CALCIUM) 1-60 gram-mg Cap    sevelamer HCl (RENAGEL) 800 MG Tab     Family History     Problem Relation (Age of Onset)    Diabetes Father, Maternal Grandfather    Heart disease Sister     Hypertension Mother    Kidney disease Father    No Known Problems Son, Son, Sister, Brother, Maternal Grandmother, Paternal Grandmother, Paternal Grandfather, Sister, Brother, Maternal Aunt, Maternal Uncle, Paternal Aunt, Paternal Uncle        Tobacco Use    Smoking status: Never Smoker    Smokeless tobacco: Never Used   Substance and Sexual Activity    Alcohol use: No     Comment: . 2 children. works at [employer].    Drug use: No    Sexual activity: Yes     Partners: Male     Review of Systems   Constitutional: Negative.    HENT: Negative.    Eyes: Negative.    Respiratory: Positive for shortness of breath. Negative for cough.    Cardiovascular: Negative.    Gastrointestinal: Negative.    Genitourinary: Positive for decreased urine volume. Negative for dysuria, flank pain and hematuria.   Musculoskeletal: Negative.    Skin: Negative.    Neurological: Negative.    Psychiatric/Behavioral: Negative.      Objective:     Vital Signs (Most Recent):  Temp: 98.1 °F (36.7 °C) (03/14/20 0825)  Pulse: 84 (03/14/20 0941)  Resp: 20 (03/14/20 0800)  BP: (!) 181/98 (03/14/20 0941)  SpO2: 96 % (03/14/20 0939)  O2 Device (Oxygen Therapy): room air (03/14/20 0343) Vital Signs (24h Range):  Temp:  [98.1 °F (36.7 °C)-99 °F (37.2 °C)] 98.1 °F (36.7 °C)  Pulse:  [78-99] 84  Resp:  [16-20] 20  SpO2:  [91 %-97 %] 96 %  BP: (177-203)/() 181/98        There is no height or weight on file to calculate BMI.  There is no height or weight on file to calculate BSA.    No intake/output data recorded.    Physical Exam   Constitutional: She is oriented to person, place, and time. She appears well-developed and well-nourished.   Eyes: Pupils are equal, round, and reactive to light. Conjunctivae are normal.   Neck: Normal range of motion. Neck supple.   Cardiovascular: Normal rate.   Pulmonary/Chest: Effort normal. She has rales.   Abdominal: Soft. Bowel sounds are normal.   Musculoskeletal: Normal range of motion. She exhibits no  edema.   Neurological: She is alert and oriented to person, place, and time.   Skin: Skin is warm and dry.   RUE AVG with +thrill and +bruit    Psychiatric: She has a normal mood and affect. Her behavior is normal.       Significant Labs:  CBC:   Recent Labs   Lab 03/14/20  0437   WBC 4.96   RBC 3.35*   HGB 10.9*   HCT 35.4*      *   MCH 32.5*   MCHC 30.8*     CMP:   Recent Labs   Lab 03/13/20  0927 03/14/20  0528    95   CALCIUM 9.8 9.9   ALBUMIN 3.2*  --    PROT 6.6  --     142   K 4.4 4.5   CO2 27 27    102   BUN 43* 63*   CREATININE 7.7* 9.2*   ALKPHOS 124  --    ALT 60*  --    AST 63*  --    BILITOT 0.7  --      All labs within the past 24 hours have been reviewed.        Assessment/Plan:     * Hypertensive emergency  Managed by primary team     ESRD (end stage renal disease) on dialysis  Outpatient HD Information:    -Outpatient HD unit: [facility]  -Nephrologist: MD Hailey  -HD tx days: TTS  -HD tx time: 3hrs  -Last HD tx: Thrusday  -HD access: LUE AVG  -HD modality: iHD  -Residual urine: Anuric   -EDW: Pt does not know     Inpatient HD Plan:    -Plan for HD at bedside for volume management and metabolic clearance  -Keep map >65  -Renal diet  -Strict I/O's and daily weights  -Daily renal function panels   -Maintain Hgb >7.0  -Will continue to follow while inpatient           Thank you for your consult. I will follow-up with patient. Please contact us if you have any additional questions.    Jose Elias Brock NP  Nephrology  [facility]

## 2020-03-14 NOTE — PROGRESS NOTES
Pt completed 3 hours dialysis via left upper graft.  Needles removed and pressure held for 10 minutes, hemostasis achieved.  drsg applied and taped.  Net removal 3 liters, pt has mild hand cramping at very end of treatment.  Report called to Shawn, pt returned to room by escort.

## 2020-03-14 NOTE — ED NOTES
Pt c/o SOB, chest tightness, HTN, and cough that began a few days ago. Pt denies fever, chills, malaise, abdominal pain, HA, N/V, changes in urination/BM, recent travel, contact with sick. Pt is dialysis pt and last dialysis was Thursday.

## 2020-03-14 NOTE — SUBJECTIVE & OBJECTIVE
Past Medical History:   Diagnosis Date    Anemia     Bronchitis 03/01/2017    Cancer 2016    kidney cancer    CMV (cytomegalovirus) antibody positive     Essential hypertension 9/23/2015    H/O herpes simplex type 2 infection     Herpes simplex type 1 antibody positive     History of kidney cancer     s/p left nephrectomy 1/2016    Hyperparathyroidism, unspecified     Hyperuricemia without signs of inflammatory arthritis and tophaceous disease     Kidney stones     LGSIL (low grade squamous intraepithelial dysplasia)     Myocardiopathy 7/21/2017    Prediabetes     Proteinuria     Renal disorder     Thyroid nodule     Urate nephropathy        Past Surgical History:   Procedure Laterality Date    BREAST CYST EXCISION      COLONOSCOPY N/A 11/12/2015    NEPHRECTOMY-LAPAROSCOPIC Left 01/12/2016    PERITONEAL CATHETER INSERTION      Permacath insertion  01/12/2017    SALPINGOOPHORECTOMY Right 2016    KJB---DAVINCI    TONSILLECTOMY      TUBAL LIGATION         Review of patient's allergies indicates:   Allergen Reactions    Coreg [carvedilol] Other (See Comments)     Nausea/vomiting    Allopurinol      Other reaction(s): abnormal transaminases       No current facility-administered medications on file prior to encounter.      Current Outpatient Medications on File Prior to Encounter   Medication Sig    acetaminophen (TYLENOL) 500 MG tablet Take 500 mg by mouth every 6 (six) hours as needed for Pain.    albuterol (PROVENTIL/VENTOLIN HFA) 90 mcg/actuation inhaler Inhale 1-2 puffs into the lungs every 4 to 6 hours as needed for Wheezing or Shortness of Breath (chest tightness).    aluminum-magnesium hydroxide-simethicone (MAALOX) 200-200-20 mg/5 mL Susp Take 30 mLs by mouth every 6 (six) hours as needed.     amLODIPine (NORVASC) 5 MG tablet TAKE 1 TABLET BY MOUTH EVERY DAY    benzonatate (TESSALON) 200 MG capsule Take 1 capsule (200 mg total) by mouth 3 (three) times daily as needed for Cough.  (Patient not taking: Reported on 1/8/2020)    cabozantinib (CABOMETYX) 60 mg Tab Take 60 mg by mouth once daily.    cloNIDine 0.2 mg/24 hr td ptwk (CATAPRES) 0.2 mg/24 hr PLACE 1 PATCH ONTO THE SKIN EVERY 7 DAYS.    febuxostat (ULORIC) 40 mg Tab Take 1 tablet (40 mg total) by mouth once daily. (Patient not taking: Reported on 1/8/2020)    hydrALAZINE (APRESOLINE) 100 MG tablet TAKE 1 TABLET BY MOUTH EVERY 12 HOURS    inhalation spacing device Use as directed for inhalation.    isosorbide mononitrate (IMDUR) 30 MG 24 hr tablet TAKE 1 TABLET BY MOUTH EVERY DAY    lidocaine-prilocaine (EMLA) cream APPLY ATLEAST 30 MINUTES BEFORE TREATMENT 3 TIMES A WEEK    metoprolol succinate (TOPROL-XL) 50 MG 24 hr tablet TAKE 1 TABLET BY MOUTH EVERY DAY    multivitamin-Ca-iron-minerals (ONE-A-DAY WOMENS FORMULA) 27-0.4 mg Tab Take by mouth. 1 Tablet Oral Every day    olmesartan (BENICAR) 40 MG tablet TAKE 1 TABLET BY MOUTH EVERY DAY    ondansetron (ZOFRAN-ODT) 8 MG TbDL Take 1 tablet (8 mg total) by mouth every 8 (eight) hours as needed.    psyllium husk-calcium (METAMUCIL PLUS CALCIUM) 1-60 gram-mg Cap Take by mouth. 2 Capsule Oral Every morning    sevelamer HCl (RENAGEL) 800 MG Tab Take 2 tablets (1,600 mg total) by mouth 3 (three) times daily with meals. (Patient not taking: Reported on 1/8/2020)     Family History     Problem Relation (Age of Onset)    Diabetes Father, Maternal Grandfather    Heart disease Sister    Hypertension Mother    Kidney disease Father    No Known Problems Son, Son, Sister, Brother, Maternal Grandmother, Paternal Grandmother, Paternal Grandfather, Sister, Brother, Maternal Aunt, Maternal Uncle, Paternal Aunt, Paternal Uncle        Tobacco Use    Smoking status: Never Smoker    Smokeless tobacco: Never Used   Substance and Sexual Activity    Alcohol use: No     Comment: . 2 children. works at [employer].    Drug use: No    Sexual activity: Yes     Partners: Male     Review of Systems    Constitutional: Negative for chills, diaphoresis, fatigue, fever and unexpected weight change.   HENT: Negative for congestion, rhinorrhea, sneezing, sore throat, trouble swallowing and voice change.    Eyes: Negative for photophobia and visual disturbance.   Respiratory: Positive for cough, chest tightness and shortness of breath.    Cardiovascular: Negative for chest pain, palpitations and leg swelling.   Gastrointestinal: Negative for constipation, diarrhea, nausea and vomiting.   Endocrine: Negative for polydipsia, polyphagia and polyuria.   Genitourinary: Negative for dysuria and frequency.   Musculoskeletal: Negative for arthralgias and myalgias.   Skin: Negative for rash and wound.   Neurological: Negative for tremors and weakness.   Hematological: Negative for adenopathy. Does not bruise/bleed easily.   Psychiatric/Behavioral: Negative for dysphoric mood. The patient is not nervous/anxious.      Objective:     Vital Signs (Most Recent):  Temp: 98.1 °F (36.7 °C) (03/14/20 0825)  Pulse: 81 (03/14/20 0841)  Resp: 20 (03/14/20 0800)  BP: (!) 183/96 (03/14/20 0841)  SpO2: 95 % (03/14/20 0841) Vital Signs (24h Range):  Temp:  [98.1 °F (36.7 °C)-99 °F (37.2 °C)] 98.1 °F (36.7 °C)  Pulse:  [78-99] 81  Resp:  [16-20] 20  SpO2:  [91 %-97 %] 95 %  BP: (177-203)/() 183/96        There is no height or weight on file to calculate BMI.    Physical Exam   Constitutional: She is oriented to person, place, and time. She appears well-developed and well-nourished.   HENT:   Head: Normocephalic and atraumatic.   Eyes: Pupils are equal, round, and reactive to light. EOM are normal.   Neck: Normal range of motion. Neck supple. No tracheal deviation present. No thyromegaly present.   Cardiovascular: Normal rate and regular rhythm.   No murmur heard.  Pulmonary/Chest: Effort normal and breath sounds normal. She has no wheezes.   Abdominal: Soft. Bowel sounds are normal. There is no tenderness.   Musculoskeletal: Normal range  of motion. She exhibits no edema or tenderness.   Lymphadenopathy:     She has no cervical adenopathy.   Neurological: She is alert and oriented to person, place, and time. She has normal reflexes. No cranial nerve deficit.   Skin: Skin is warm and dry.   Psychiatric: She has a normal mood and affect. Her behavior is normal.   Nursing note and vitals reviewed.        CRANIAL NERVES     CN III, IV, VI   Pupils are equal, round, and reactive to light.  Extraocular motions are normal.        Significant Labs: All pertinent labs within the past 24 hours have been reviewed.    Significant Imaging: I have reviewed all pertinent imaging results/findings within the past 24 hours.

## 2020-03-14 NOTE — HPI
Ms Bloom is a 54 y.o. female with a history of ESRD (HD TTS), who presents with c/o SOB x1 week. She endorses increased difficulty breathing when laying flat. She denies fever, CP, cough, aches/chills, N/V/D, abdominal pain, HA, dizziness, and swelling to her lower extremities. She is anuric. She was last dialyzed on Thursday. Nephrology has been consulted for management of ESRD and HD.     -HPI was obtained from patient interview and from review of the patient's EMR.

## 2020-03-14 NOTE — ED NOTES
Patient identifiers verified and correct for Eva oLpez Walker.    LOC: The patient is awake, alert and aware of environment with an appropriate affect, the patient is oriented x 4 and speaking appropriately.     APPEARANCE: Patient resting comfortably and in no acute distress, patient is clean and well groomed, patient's clothing is properly fastened. Surgical Mask on at all times while in room, isolation precautions maintained     SKIN: The skin is warm and dry, color consistent with ethnicity, patient has normal skin turgor and moist mucus membranes, AV fistula- left arm     MUSCULOSKELETAL: Patient moving all extremities spontaneously, no obvious swelling or deformities noted.    RESPIRATORY: Airway is open and patent, respirations are spontaneous, patient has a normal effort and rate, no accessory muscle use noted  CARDIAC: Patient has a normal rate and regular rhythm, no periphreal edema noted, capillary refill < 3 seconds.  ABDOMEN: Soft and non tender to palpation, no distention noted, normoactive bowel sounds present in all four quadrants.  NEUROLOGIC: PERRLA, 3 mm bilaterally, eyes open spontaneously, behavior appropriate to situation, follows commands, facial expression symmetrical, bilateral hand grasp equal and even, purposeful motor response noted, normal sensation in all extremities when touched with a finger.

## 2020-03-14 NOTE — PLAN OF CARE
Hospital Medicine Brief Note    Patient will be transferred to [facility] rule out cohort team (IM A, V, S).    Sharifa Sanchez M.D., M.P.H.  Department of [facility] Medicine  [facility]  (pager) [phone] (spect) [phone]

## 2020-03-14 NOTE — SUBJECTIVE & OBJECTIVE
Past Medical History:   Diagnosis Date    Anemia     Bronchitis 03/01/2017    Cancer 2016    kidney cancer    CMV (cytomegalovirus) antibody positive     Essential hypertension 9/23/2015    H/O herpes simplex type 2 infection     Herpes simplex type 1 antibody positive     History of kidney cancer     s/p left nephrectomy 1/2016    Hyperparathyroidism, unspecified     Hyperuricemia without signs of inflammatory arthritis and tophaceous disease     Kidney stones     LGSIL (low grade squamous intraepithelial dysplasia)     Myocardiopathy 7/21/2017    Prediabetes     Proteinuria     Renal disorder     Thyroid nodule     Urate nephropathy        Past Surgical History:   Procedure Laterality Date    BREAST CYST EXCISION      COLONOSCOPY N/A 11/12/2015    NEPHRECTOMY-LAPAROSCOPIC Left 01/12/2016    PERITONEAL CATHETER INSERTION      Permacath insertion  01/12/2017    SALPINGOOPHORECTOMY Right 2016    KJB---DAVINCI    TONSILLECTOMY      TUBAL LIGATION         Review of patient's allergies indicates:   Allergen Reactions    Coreg [carvedilol] Other (See Comments)     Nausea/vomiting    Allopurinol      Other reaction(s): abnormal transaminases     Current Facility-Administered Medications   Medication Frequency    0.9%  NaCl infusion Once    acetaminophen tablet 650 mg Q4H PRN    albuterol-ipratropium 2.5 mg-0.5 mg/3 mL nebulizer solution 3 mL Q4H PRN    bisacodyL suppository 10 mg Daily PRN    dextrose 10% (D10W) Bolus PRN    dextrose 10% (D10W) Bolus PRN    glucagon (human recombinant) injection 1 mg PRN    glucose chewable tablet 16 g PRN    glucose chewable tablet 24 g PRN    heparin (porcine) injection 5,000 Units Q8H    polyethylene glycol packet 17 g BID PRN    promethazine (PHENERGAN) 12.5 mg in dextrose 5 % 50 mL IVPB Q6H PRN     Current Outpatient Medications   Medication    acetaminophen (TYLENOL) 500 MG tablet    albuterol (PROVENTIL/VENTOLIN HFA) 90 mcg/actuation  inhaler    aluminum-magnesium hydroxide-simethicone (MAALOX) 200-200-20 mg/5 mL Susp    amLODIPine (NORVASC) 5 MG tablet    benzonatate (TESSALON) 200 MG capsule    cabozantinib (CABOMETYX) 60 mg Tab    cloNIDine 0.2 mg/24 hr td ptwk (CATAPRES) 0.2 mg/24 hr    febuxostat (ULORIC) 40 mg Tab    hydrALAZINE (APRESOLINE) 100 MG tablet    inhalation spacing device    isosorbide mononitrate (IMDUR) 30 MG 24 hr tablet    lidocaine-prilocaine (EMLA) cream    metoprolol succinate (TOPROL-XL) 50 MG 24 hr tablet    multivitamin-Ca-iron-minerals (ONE-A-DAY WOMENS FORMULA) 27-0.4 mg Tab    olmesartan (BENICAR) 40 MG tablet    ondansetron (ZOFRAN-ODT) 8 MG TbDL    psyllium husk-calcium (METAMUCIL PLUS CALCIUM) 1-60 gram-mg Cap    sevelamer HCl (RENAGEL) 800 MG Tab     Family History     Problem Relation (Age of Onset)    Diabetes Father, Maternal Grandfather    Heart disease Sister    Hypertension Mother    Kidney disease Father    No Known Problems Son, Son, Sister, Brother, Maternal Grandmother, Paternal Grandmother, Paternal Grandfather, Sister, Brother, Maternal Aunt, Maternal Uncle, Paternal Aunt, Paternal Uncle        Tobacco Use    Smoking status: Never Smoker    Smokeless tobacco: Never Used   Substance and Sexual Activity    Alcohol use: No     Comment: . 2 children. works at [employer].    Drug use: No    Sexual activity: Yes     Partners: Male     Review of Systems   Constitutional: Negative.    HENT: Negative.    Eyes: Negative.    Respiratory: Positive for shortness of breath. Negative for cough.    Cardiovascular: Negative.    Gastrointestinal: Negative.    Genitourinary: Positive for decreased urine volume. Negative for dysuria, flank pain and hematuria.   Musculoskeletal: Negative.    Skin: Negative.    Neurological: Negative.    Psychiatric/Behavioral: Negative.      Objective:     Vital Signs (Most Recent):  Temp: 98.1 °F (36.7 °C) (03/14/20 0825)  Pulse: 84 (03/14/20 0941)  Resp: 20  (03/14/20 0800)  BP: (!) 181/98 (03/14/20 0941)  SpO2: 96 % (03/14/20 0939)  O2 Device (Oxygen Therapy): room air (03/14/20 0343) Vital Signs (24h Range):  Temp:  [98.1 °F (36.7 °C)-99 °F (37.2 °C)] 98.1 °F (36.7 °C)  Pulse:  [78-99] 84  Resp:  [16-20] 20  SpO2:  [91 %-97 %] 96 %  BP: (177-203)/() 181/98        There is no height or weight on file to calculate BMI.  There is no height or weight on file to calculate BSA.    No intake/output data recorded.    Physical Exam   Constitutional: She is oriented to person, place, and time. She appears well-developed and well-nourished.   Eyes: Pupils are equal, round, and reactive to light. Conjunctivae are normal.   Neck: Normal range of motion. Neck supple.   Cardiovascular: Normal rate.   Pulmonary/Chest: Effort normal. She has rales.   Abdominal: Soft. Bowel sounds are normal.   Musculoskeletal: Normal range of motion. She exhibits no edema.   Neurological: She is alert and oriented to person, place, and time.   Skin: Skin is warm and dry.   RUE AVG with +thrill and +bruit    Psychiatric: She has a normal mood and affect. Her behavior is normal.       Significant Labs:  CBC:   Recent Labs   Lab 03/14/20  0437   WBC 4.96   RBC 3.35*   HGB 10.9*   HCT 35.4*      *   MCH 32.5*   MCHC 30.8*     CMP:   Recent Labs   Lab 03/13/20 0927 03/14/20  0528    95   CALCIUM 9.8 9.9   ALBUMIN 3.2*  --    PROT 6.6  --     142   K 4.4 4.5   CO2 27 27    102   BUN 43* 63*   CREATININE 7.7* 9.2*   ALKPHOS 124  --    ALT 60*  --    AST 63*  --    BILITOT 0.7  --      All labs within the past 24 hours have been reviewed.

## 2020-03-14 NOTE — ED PROVIDER NOTES
"Encounter Date: 3/14/2020       History     Chief Complaint   Patient presents with    Shortness of Breath     T,Th, Sat Dialysis pt presents to ED w/ c/o SOB with rest and exertion x1 week. Pt also c/o "chest tightness" upon inhalation.      HPI     Pt is a 54 y.o. female w/h/o ESRD (T-TR-Sat), who presents for SOB. This has been ongoing for one week. Associated with chest tightness that is constant, non-pleuritic, non-exertional. No fever, chills, cough, leg swelling. Reports SOB worse with laying flat. No sick contacts / travel. No change from base weight. No change in PO fluids or salt.       Review of patient's allergies indicates:   Allergen Reactions    Coreg [carvedilol] Other (See Comments)     Nausea/vomiting    Allopurinol      Other reaction(s): abnormal transaminases     Past Medical History:   Diagnosis Date    Anemia     Bronchitis 03/01/2017    Cancer 2016    kidney cancer    CMV (cytomegalovirus) antibody positive     Essential hypertension 9/23/2015    H/O herpes simplex type 2 infection     Herpes simplex type 1 antibody positive     History of kidney cancer     s/p left nephrectomy 1/2016    Hyperparathyroidism, unspecified     Hyperuricemia without signs of inflammatory arthritis and tophaceous disease     Kidney stones     LGSIL (low grade squamous intraepithelial dysplasia)     Myocardiopathy 7/21/2017    Prediabetes     Proteinuria     Renal disorder     Thyroid nodule     Urate nephropathy      Past Surgical History:   Procedure Laterality Date    BREAST CYST EXCISION      COLONOSCOPY N/A 11/12/2015    NEPHRECTOMY-LAPAROSCOPIC Left 01/12/2016    PERITONEAL CATHETER INSERTION      Permacath insertion  01/12/2017    SALPINGOOPHORECTOMY Right 2016    KJB---DAVINCI    TONSILLECTOMY      TUBAL LIGATION       Family History   Problem Relation Age of Onset    Hypertension Mother     Diabetes Father     Kidney disease Father     No Known Problems Son     No Known " Problems Son     Diabetes Maternal Grandfather     No Known Problems Sister     No Known Problems Brother     No Known Problems Maternal Grandmother     No Known Problems Paternal Grandmother     No Known Problems Paternal Grandfather     Heart disease Sister     No Known Problems Sister     No Known Problems Brother     No Known Problems Maternal Aunt     No Known Problems Maternal Uncle     No Known Problems Paternal Aunt     No Known Problems Paternal Uncle     Breast cancer Neg Hx     Colon cancer Neg Hx     Cancer Neg Hx     Stroke Neg Hx     Amblyopia Neg Hx     Blindness Neg Hx     Cataracts Neg Hx     Glaucoma Neg Hx     Macular degeneration Neg Hx     Retinal detachment Neg Hx     Strabismus Neg Hx     Thyroid disease Neg Hx      Social History     Tobacco Use    Smoking status: Never Smoker    Smokeless tobacco: Never Used   Substance Use Topics    Alcohol use: No     Comment: . 2 children. works at [employer].    Drug use: No     Review of Systems    General: No fever.  No chills.  Eyes: No visual changes.  Head: No headache.    Integument: No rashes or lesions.  Chest: +shortness of breath.  Cardiovascular: +chest tightness.  Abdomen: No abdominal pain.  No nausea or vomiting.  Urinary: No abnormal urination.  Neurologic: No focal weakness.  No numbness.  Hematologic: No easy bruising.  Endocrine: No excessive thirst or urination.      Physical Exam     Initial Vitals [03/14/20 0343]   BP Pulse Resp Temp SpO2   (!) 203/112 99 16 99 °F (37.2 °C) 96 %      MAP       --         Physical Exam     Appearance: No acute distress.  HEENT: Normocephalic. Atraumatic. No conjunctival injection. EOMI. PERRL.  Neck: No JVD. No LN. Neck supple.    Chest: Non-tender. Clear to auscultation bilaterally.  Good air movement.  No wheezes.  No rhonchi.  Cardiovascular: Regular rate and rhythm. No murmurs. No gallops. No rubs. +2 radial/DP/DT pulses bilaterally. No LE edema or ttp  Abdomen:  Soft. Not distended. Nontender. No guarding. No rebound. No Masses  Musculoskeletal: Good range of motion all joints. No deformities.    Neurologic: Alert and oriented x 3.  Equal strength in upper and lower extremities bilaterally. Normal sensation. No facial droop. Normal speech.    Psych:  Appropriate, conversant.   Integumentary: No rashes seen.  Good turgor.  No abrasions.  No ecchymoses.      ED Course   Procedures  Labs Reviewed   CBC W/ AUTO DIFFERENTIAL - Abnormal; Notable for the following components:       Result Value    RBC 3.35 (*)     Hemoglobin 10.9 (*)     Hematocrit 35.4 (*)     Mean Corpuscular Volume 106 (*)     Mean Corpuscular Hemoglobin 32.5 (*)     Mean Corpuscular Hemoglobin Conc 30.8 (*)     RDW 16.1 (*)     Eosinophil% 8.1 (*)     All other components within normal limits   B-TYPE NATRIURETIC PEPTIDE - Abnormal; Notable for the following components:    BNP >4,900 (*)     All other components within normal limits   TROPONIN I - Abnormal; Notable for the following components:    Troponin I 0.112 (*)     All other components within normal limits   BASIC METABOLIC PANEL - Abnormal; Notable for the following components:    BUN, Bld 63 (*)     Creatinine 9.2 (*)     eGFR if  5.0 (*)     eGFR if non  4.3 (*)     All other components within normal limits   APTT   PROTIME-INR   TROPONIN I          Imaging Results          X-Ray Chest AP Portable (Final result)  Result time 03/14/20 05:53:56    Final result by Rodrigue Adam MD (03/14/20 05:53:56)                 Impression:      Bilateral appearance of prominence of the pulmonary vascular with interstitial and alveolar infiltrate correlation for pulmonary vascular congestion and edema is needed.      Electronically signed by: Rodrigue Adam  Date:    03/14/2020  Time:    05:53             Narrative:    EXAMINATION:  XR CHEST AP PORTABLE    CLINICAL HISTORY:  shortness of breath;    TECHNIQUE:  Single frontal view  of the chest was performed.    COMPARISON:  April 5, 2017    FINDINGS:  Single chest view is submitted.  The cardiomediastinal silhouette appears prominent, likely exaggerated by portable technique, with baseline prominence appearing similar to the prior study.  There is mild prominence of the pulmonary vascular bilaterally with mild interstitial and alveolar infiltrate.  There is no evidence for significant pleural effusion or pneumothorax.  The osseous structures demonstrate chronic change.                                                                 Clinical Impression:       ICD-10-CM ICD-9-CM   1. SOB (shortness of breath) R06.02 786.05   2. Shortness of breath R06.02 786.05     55 yo female presents to ED for evaluation of SOB, chest tightness x 1 wk. Exam shows initial HTN, otherwise VSS, afeb. HTN improved without intervention. Lungs CTAB. No LE edema or ttp. No JVD. EKG NSR, no STEMI. Initial labs compared to prior (2017) show elevated BNP (seen on prior) and elevated trop that was also seen on prior but not to same extent. Pt unable to get into HD today. Meets criteria for HTN emergency with trop leak and BP. Ordered hydralazine, repeat trop. Discussed case with hospital medicine and nephrology.                         Alison Trujillo MD  03/14/20 [id number]

## 2020-03-14 NOTE — ASSESSMENT & PLAN NOTE
Outpatient HD Information:    -Outpatient HD unit: [facility]  -Nephrologist: MD Hailey  -HD tx days: TTS  -HD tx time: 3hrs  -Last HD tx: Thrusday  -HD access: LUE AVG  -HD modality: iHD  -Residual urine: Anuric   -EDW: Pt does not know     Inpatient HD Plan:    -Plan for HD at bedside for volume management and metabolic clearance  -Keep map >65  -Renal diet  -Strict I/O's and daily weights  -Daily renal function panels   -Maintain Hgb >7.0  -Will continue to follow while inpatient

## 2020-03-14 NOTE — H&P
"[facility] Hospital Medicine  History & Physical    Patient Name: Eva Bloom  MRN: [id number]  Admission Date: 3/14/2020  Attending Physician: Alison Trujillo MD   Primary Care Provider: Sowmya Mccain MD    [facility] Medicine Team: [facility] MED Y Johanna Martin PA-C     Patient information was obtained from patient, past medical records and ER records.     Subjective:     Principal Problem:Hypertensive emergency    Chief Complaint:   Chief Complaint   Patient presents with    Shortness of Breath     T,Th, Sat Dialysis pt presents to ED w/ c/o SOB with rest and exertion x1 week. Pt also c/o "chest tightness" upon inhalation.         HPI: 54 y.o. female with significant past medical history of ESRD on HD TuThSat, h/o renal cell carcinoma, combined CHF (echo 2017 EF 35-40%), HTN presented to the ER complaining of SOB x 1 week.  Pt has not missed any HD sessions.  She also reports constant chest tightness, no change with rest or exertion, denies increased stress.  No diaphoresis, N/V/D, radiation of pain.  Denies fever, chills, congestion, rhinorrhea, sore throat, HA, dizziness, lightheadedness.  No sick contacts, recent travel, or contact with person with recent international travel.  Flu vaccine received.  She does report having a cold 2 weeks ago with nasal congestion and now with residual cough.  She never had a fever when experiencing upper respiratory symptoms earlier this month.    In the ED, /112 trended down to 177/98 with hydralazine.    WBC WNL.  K WNL.  BNP>4900 (seen in 2017), Troponin 0.112 (0.072 in 2017).  The cardiomediastinal silhouette appears prominent, likely exaggerated by portable technique, with baseline prominence appearing similar to the prior study.  There is mild prominence of the pulmonary vascular bilaterally with mild interstitial and alveolar infiltrate.  There is no evidence for significant pleural effusion or pneumothorax.  Chest tightness improved " with improved BP control.    Past Medical History:   Diagnosis Date    Anemia     Bronchitis 03/01/2017    Cancer 2016    kidney cancer    CMV (cytomegalovirus) antibody positive     Essential hypertension 9/23/2015    H/O herpes simplex type 2 infection     Herpes simplex type 1 antibody positive     History of kidney cancer     s/p left nephrectomy 1/2016    Hyperparathyroidism, unspecified     Hyperuricemia without signs of inflammatory arthritis and tophaceous disease     Kidney stones     LGSIL (low grade squamous intraepithelial dysplasia)     Myocardiopathy 7/21/2017    Prediabetes     Proteinuria     Renal disorder     Thyroid nodule     Urate nephropathy        Past Surgical History:   Procedure Laterality Date    BREAST CYST EXCISION      COLONOSCOPY N/A 11/12/2015    NEPHRECTOMY-LAPAROSCOPIC Left 01/12/2016    PERITONEAL CATHETER INSERTION      Permacath insertion  01/12/2017    SALPINGOOPHORECTOMY Right 2016    KJB---DAVINCI    TONSILLECTOMY      TUBAL LIGATION         Review of patient's allergies indicates:   Allergen Reactions    Coreg [carvedilol] Other (See Comments)     Nausea/vomiting    Allopurinol      Other reaction(s): abnormal transaminases       No current facility-administered medications on file prior to encounter.      Current Outpatient Medications on File Prior to Encounter   Medication Sig    acetaminophen (TYLENOL) 500 MG tablet Take 500 mg by mouth every 6 (six) hours as needed for Pain.    albuterol (PROVENTIL/VENTOLIN HFA) 90 mcg/actuation inhaler Inhale 1-2 puffs into the lungs every 4 to 6 hours as needed for Wheezing or Shortness of Breath (chest tightness).    aluminum-magnesium hydroxide-simethicone (MAALOX) 200-200-20 mg/5 mL Susp Take 30 mLs by mouth every 6 (six) hours as needed.     amLODIPine (NORVASC) 5 MG tablet TAKE 1 TABLET BY MOUTH EVERY DAY    benzonatate (TESSALON) 200 MG capsule Take 1 capsule (200 mg total) by mouth 3 (three)  times daily as needed for Cough. (Patient not taking: Reported on 1/8/2020)    cabozantinib (CABOMETYX) 60 mg Tab Take 60 mg by mouth once daily.    cloNIDine 0.2 mg/24 hr td ptwk (CATAPRES) 0.2 mg/24 hr PLACE 1 PATCH ONTO THE SKIN EVERY 7 DAYS.    febuxostat (ULORIC) 40 mg Tab Take 1 tablet (40 mg total) by mouth once daily. (Patient not taking: Reported on 1/8/2020)    hydrALAZINE (APRESOLINE) 100 MG tablet TAKE 1 TABLET BY MOUTH EVERY 12 HOURS    inhalation spacing device Use as directed for inhalation.    isosorbide mononitrate (IMDUR) 30 MG 24 hr tablet TAKE 1 TABLET BY MOUTH EVERY DAY    lidocaine-prilocaine (EMLA) cream APPLY ATLEAST 30 MINUTES BEFORE TREATMENT 3 TIMES A WEEK    metoprolol succinate (TOPROL-XL) 50 MG 24 hr tablet TAKE 1 TABLET BY MOUTH EVERY DAY    multivitamin-Ca-iron-minerals (ONE-A-DAY WOMENS FORMULA) 27-0.4 mg Tab Take by mouth. 1 Tablet Oral Every day    olmesartan (BENICAR) 40 MG tablet TAKE 1 TABLET BY MOUTH EVERY DAY    ondansetron (ZOFRAN-ODT) 8 MG TbDL Take 1 tablet (8 mg total) by mouth every 8 (eight) hours as needed.    psyllium husk-calcium (METAMUCIL PLUS CALCIUM) 1-60 gram-mg Cap Take by mouth. 2 Capsule Oral Every morning    sevelamer HCl (RENAGEL) 800 MG Tab Take 2 tablets (1,600 mg total) by mouth 3 (three) times daily with meals. (Patient not taking: Reported on 1/8/2020)     Family History     Problem Relation (Age of Onset)    Diabetes Father, Maternal Grandfather    Heart disease Sister    Hypertension Mother    Kidney disease Father    No Known Problems Son, Son, Sister, Brother, Maternal Grandmother, Paternal Grandmother, Paternal Grandfather, Sister, Brother, Maternal Aunt, Maternal Uncle, Paternal Aunt, Paternal Uncle        Tobacco Use    Smoking status: Never Smoker    Smokeless tobacco: Never Used   Substance and Sexual Activity    Alcohol use: No     Comment: . 2 children. works at [employer].    Drug use: No    Sexual activity: Yes      Partners: Male     Review of Systems   Constitutional: Negative for chills, diaphoresis, fatigue, fever and unexpected weight change.   HENT: Negative for congestion, rhinorrhea, sneezing, sore throat, trouble swallowing and voice change.    Eyes: Negative for photophobia and visual disturbance.   Respiratory: Positive for cough, chest tightness and shortness of breath.    Cardiovascular: Negative for chest pain, palpitations and leg swelling.   Gastrointestinal: Negative for constipation, diarrhea, nausea and vomiting.   Endocrine: Negative for polydipsia, polyphagia and polyuria.   Genitourinary: Negative for dysuria and frequency.   Musculoskeletal: Negative for arthralgias and myalgias.   Skin: Negative for rash and wound.   Neurological: Negative for tremors and weakness.   Hematological: Negative for adenopathy. Does not bruise/bleed easily.   Psychiatric/Behavioral: Negative for dysphoric mood. The patient is not nervous/anxious.      Objective:     Vital Signs (Most Recent):  Temp: 98.1 °F (36.7 °C) (03/14/20 0825)  Pulse: 81 (03/14/20 0841)  Resp: 20 (03/14/20 0800)  BP: (!) 183/96 (03/14/20 0841)  SpO2: 95 % (03/14/20 0841) Vital Signs (24h Range):  Temp:  [98.1 °F (36.7 °C)-99 °F (37.2 °C)] 98.1 °F (36.7 °C)  Pulse:  [78-99] 81  Resp:  [16-20] 20  SpO2:  [91 %-97 %] 95 %  BP: (177-203)/() 183/96        There is no height or weight on file to calculate BMI.    Physical Exam   Constitutional: She is oriented to person, place, and time. She appears well-developed and well-nourished.   HENT:   Head: Normocephalic and atraumatic.   Eyes: Pupils are equal, round, and reactive to light. EOM are normal.   Neck: Normal range of motion. Neck supple. No tracheal deviation present. No thyromegaly present.   Cardiovascular: Normal rate and regular rhythm.   No murmur heard.  Pulmonary/Chest: Effort normal and breath sounds normal. She has no wheezes.   Abdominal: Soft. Bowel sounds are normal. There is no  tenderness.   Musculoskeletal: Normal range of motion. She exhibits no edema or tenderness.   Lymphadenopathy:     She has no cervical adenopathy.   Neurological: She is alert and oriented to person, place, and time. She has normal reflexes. No cranial nerve deficit.   Skin: Skin is warm and dry.   Psychiatric: She has a normal mood and affect. Her behavior is normal.   Nursing note and vitals reviewed.        CRANIAL NERVES     CN III, IV, VI   Pupils are equal, round, and reactive to light.  Extraocular motions are normal.        Significant Labs: All pertinent labs within the past 24 hours have been reviewed.    Significant Imaging: I have reviewed all pertinent imaging results/findings within the past 24 hours.    Assessment/Plan:     * Hypertensive emergency  Essential HTN  Pt with chest tightness improved after BP trended down with hydralazine 10 mg IV.  Troponin 0.112->repeat in process, no ischemic findings on EKG.  Echo ordered as below.  EKG prn chest pain.  Administer home meds: amlodipine 5 mg, continue clonidine patch, hydralazine 100 mg bid, isosorbide 30 mg, metoprolol succinate 50 mg, olmesartan 40 mg    ESRD (end stage renal disease) on dialysis  H/o kidney cancer  SOB  Cough  [facility] HD, last session Thursday  Nephrology consulted  Renal Diet  RFP, Mg, CBC daily  Pt with SOB, dry cough, no myalgias or weakness x 1 week.  T 99F->98.1F.  Will check ESR, CRP, Procal, RVP, influenza.  RN in ED placed surgical mask.  Flu negative, received flu shot.  Pt reports URI earlier this month with nasal congestion which may explain residual cough.    Combined systolic and diastolic cardiac dysfunction  Pulmonary HTN  Repeat echo ordered  Continue toprol XL 50 mg and olmesartan 40 mg daily        VTE Risk Mitigation (From admission, onward)         Ordered     heparin (porcine) injection 5,000 Units  Every 8 hours      03/14/20 0823     IP VTE HIGH RISK PATIENT  Once      03/14/20 0823     Place PING hose   Until discontinued      03/14/20 0823     Place sequential compression device  Until discontinued      03/14/20 0823                   Johanna Martin PA-C  Department of Hospital Medicine   [facility]

## 2020-03-14 NOTE — HPI
54 y.o. female with significant past medical history of ESRD on HD TuThSat, h/o renal cell carcinoma, combined CHF (echo 2017 EF 35-40%), HTN presented to the ER complaining of SOB x 1 week.  Pt has not missed any HD sessions.  She also reports constant chest tightness, no change with rest or exertion, denies increased stress.  No diaphoresis, N/V/D, radiation of pain.  Denies fever, chills, congestion, rhinorrhea, sore throat, HA, dizziness, lightheadedness.  No sick contacts, recent travel, or contact with person with recent international travel.  Flu vaccine received.  She does report having a cold 2 weeks ago with nasal congestion and now with residual cough.  She never had a fever when experiencing upper respiratory symptoms earlier this month.    In the ED, /112 trended down to 177/98 with hydralazine.    WBC WNL.  K WNL.  BNP>4900 (seen in 2017), Troponin 0.112 (0.072 in 2017).  The cardiomediastinal silhouette appears prominent, likely exaggerated by portable technique, with baseline prominence appearing similar to the prior study.  There is mild prominence of the pulmonary vascular bilaterally with mild interstitial and alveolar infiltrate.  There is no evidence for significant pleural effusion or pneumothorax.  Chest tightness improved with improved BP control.

## 2020-03-14 NOTE — ASSESSMENT & PLAN NOTE
H/o kidney cancer  SOB  Cough  [facility] HD, last session Thursday  Nephrology consulted  Renal Diet  RFP, Mg, CBC daily  Pt with SOB, dry cough, no myalgias or weakness x 1 week.  T 99F->98.1F.  Will check ESR, CRP, Procal, RVP, influenza.  RN in ED placed surgical mask.  Flu negative, received flu shot.  Pt reports URI earlier this month with nasal congestion which may explain residual cough.

## 2020-03-14 NOTE — ASSESSMENT & PLAN NOTE
Essential HTN  Pt with chest tightness improved after BP trended down with hydralazine 10 mg IV.  Troponin 0.112->repeat in process, no ischemic findings on EKG.  Echo ordered as below.  EKG prn chest pain.  Administer home meds: amlodipine 5 mg, continue clonidine patch, hydralazine 100 mg bid, isosorbide 30 mg, metoprolol succinate 50 mg, olmesartan 40 mg

## 2020-03-15 PROBLEM — B34.8 RHINOVIRUS INFECTION: Status: ACTIVE | Noted: 2020-03-15

## 2020-03-15 LAB
ALBUMIN SERPL BCP-MCNC: 2.6 G/DL (ref 3.5–5.2)
ANION GAP SERPL CALC-SCNC: 12 MMOL/L (ref 8–16)
ASCENDING AORTA: 3.17 CM
AV INDEX (PROSTH): 0.58
AV MEAN GRADIENT: 6 MMHG
AV PEAK GRADIENT: 10 MMHG
AV VALVE AREA: 2.02 CM2
AV VELOCITY RATIO: 0.63
BASOPHILS # BLD AUTO: 0.05 K/UL (ref 0–0.2)
BASOPHILS NFR BLD: 1.2 % (ref 0–1.9)
BSA FOR ECHO PROCEDURE: 1.64 M2
BUN SERPL-MCNC: 40 MG/DL (ref 6–20)
CALCIUM SERPL-MCNC: 9 MG/DL (ref 8.7–10.5)
CHLORIDE SERPL-SCNC: 105 MMOL/L (ref 95–110)
CO2 SERPL-SCNC: 24 MMOL/L (ref 23–29)
CREAT SERPL-MCNC: 6.8 MG/DL (ref 0.5–1.4)
CV ECHO LV RWT: 0.27 CM
DIFFERENTIAL METHOD: ABNORMAL
DOP CALC AO PEAK VEL: 1.56 M/S
DOP CALC AO VTI: 28.72 CM
DOP CALC LVOT AREA: 3.5 CM2
DOP CALC LVOT DIAMETER: 2.1 CM
DOP CALC LVOT PEAK VEL: 0.98 M/S
DOP CALC LVOT STROKE VOLUME: 58.12 CM3
DOP CALCLVOT PEAK VEL VTI: 16.79 CM
E WAVE DECELERATION TIME: 137.13 MSEC
E/A RATIO: 1.47
E/E' RATIO: 34 M/S
ECHO LV POSTERIOR WALL: 0.79 CM (ref 0.6–1.1)
EOSINOPHIL # BLD AUTO: 0.2 K/UL (ref 0–0.5)
EOSINOPHIL NFR BLD: 5.1 % (ref 0–8)
ERYTHROCYTE [DISTWIDTH] IN BLOOD BY AUTOMATED COUNT: 16 % (ref 11.5–14.5)
EST. GFR  (AFRICAN AMERICAN): 7.2 ML/MIN/1.73 M^2
EST. GFR  (NON AFRICAN AMERICAN): 6.3 ML/MIN/1.73 M^2
FRACTIONAL SHORTENING: 28 % (ref 28–44)
GLUCOSE SERPL-MCNC: 82 MG/DL (ref 70–110)
HCT VFR BLD AUTO: 32.7 % (ref 37–48.5)
HGB BLD-MCNC: 10.2 G/DL (ref 12–16)
IMM GRANULOCYTES # BLD AUTO: 0.01 K/UL (ref 0–0.04)
IMM GRANULOCYTES NFR BLD AUTO: 0.2 % (ref 0–0.5)
INTERVENTRICULAR SEPTUM: 0.97 CM (ref 0.6–1.1)
IVRT: 51.38 MSEC
LA MAJOR: 5.94 CM
LA MINOR: 5.88 CM
LA WIDTH: 4.49 CM
LEFT ATRIUM SIZE: 4.13 CM
LEFT ATRIUM VOLUME INDEX: 56.8 ML/M2
LEFT ATRIUM VOLUME: 93.15 CM3
LEFT INTERNAL DIMENSION IN SYSTOLE: 4.29 CM (ref 2.1–4)
LEFT VENTRICLE DIASTOLIC VOLUME INDEX: 106.66 ML/M2
LEFT VENTRICLE DIASTOLIC VOLUME: 175.01 ML
LEFT VENTRICLE MASS INDEX: 125 G/M2
LEFT VENTRICLE SYSTOLIC VOLUME INDEX: 50.4 ML/M2
LEFT VENTRICLE SYSTOLIC VOLUME: 82.77 ML
LEFT VENTRICULAR INTERNAL DIMENSION IN DIASTOLE: 5.93 CM (ref 3.5–6)
LEFT VENTRICULAR MASS: 205.46 G
LV LATERAL E/E' RATIO: 29.75 M/S
LV SEPTAL E/E' RATIO: 39.67 M/S
LYMPHOCYTES # BLD AUTO: 1.3 K/UL (ref 1–4.8)
LYMPHOCYTES NFR BLD: 30.2 % (ref 18–48)
MAGNESIUM SERPL-MCNC: 2.2 MG/DL (ref 1.6–2.6)
MCH RBC QN AUTO: 33.1 PG (ref 27–31)
MCHC RBC AUTO-ENTMCNC: 31.2 G/DL (ref 32–36)
MCV RBC AUTO: 106 FL (ref 82–98)
MONOCYTES # BLD AUTO: 0.5 K/UL (ref 0.3–1)
MONOCYTES NFR BLD: 11.4 % (ref 4–15)
MV PEAK A VEL: 0.81 M/S
MV PEAK E VEL: 1.19 M/S
NEUTROPHILS # BLD AUTO: 2.2 K/UL (ref 1.8–7.7)
NEUTROPHILS NFR BLD: 51.9 % (ref 38–73)
NRBC BLD-RTO: 0 /100 WBC
PHOSPHATE SERPL-MCNC: 5 MG/DL (ref 2.7–4.5)
PISA TR MAX VEL: 3.99 M/S
PLATELET # BLD AUTO: 139 K/UL (ref 150–350)
PMV BLD AUTO: 10.8 FL (ref 9.2–12.9)
POTASSIUM SERPL-SCNC: 4.5 MMOL/L (ref 3.5–5.1)
RA MAJOR: 5.04 CM
RA PRESSURE: 8 MMHG
RA WIDTH: 4.57 CM
RBC # BLD AUTO: 3.08 M/UL (ref 4–5.4)
RIGHT VENTRICULAR END-DIASTOLIC DIMENSION: 4.1 CM
RV TISSUE DOPPLER FREE WALL SYSTOLIC VELOCITY 1 (APICAL 4 CHAMBER VIEW): 11.82 CM/S
SINUS: 3.19 CM
SODIUM SERPL-SCNC: 141 MMOL/L (ref 136–145)
STJ: 3.05 CM
TDI LATERAL: 0.04 M/S
TDI SEPTAL: 0.03 M/S
TDI: 0.04 M/S
TR MAX PG: 64 MMHG
TRICUSPID ANNULAR PLANE SYSTOLIC EXCURSION: 2.37 CM
TV REST PULMONARY ARTERY PRESSURE: 72 MMHG
WBC # BLD AUTO: 4.14 K/UL (ref 3.9–12.7)

## 2020-03-15 PROCEDURE — 25000003 PHARM REV CODE 250: Performed by: PHYSICIAN ASSISTANT

## 2020-03-15 PROCEDURE — 99225 PR SUBSEQUENT OBSERVATION CARE,LEVEL II: CPT | Mod: ,,, | Performed by: HOSPITALIST

## 2020-03-15 PROCEDURE — 63600175 PHARM REV CODE 636 W HCPCS: Performed by: HOSPITALIST

## 2020-03-15 PROCEDURE — 25000003 PHARM REV CODE 250: Performed by: HOSPITALIST

## 2020-03-15 PROCEDURE — 63600175 PHARM REV CODE 636 W HCPCS: Performed by: PHYSICIAN ASSISTANT

## 2020-03-15 PROCEDURE — 80069 RENAL FUNCTION PANEL: CPT

## 2020-03-15 PROCEDURE — 85025 COMPLETE CBC W/AUTO DIFF WBC: CPT

## 2020-03-15 PROCEDURE — G0378 HOSPITAL OBSERVATION PER HR: HCPCS

## 2020-03-15 PROCEDURE — U0002 COVID-19 LAB TEST NON-CDC: HCPCS

## 2020-03-15 PROCEDURE — 83735 ASSAY OF MAGNESIUM: CPT

## 2020-03-15 PROCEDURE — 99225 PR SUBSEQUENT OBSERVATION CARE,LEVEL II: ICD-10-PCS | Mod: ,,, | Performed by: HOSPITALIST

## 2020-03-15 PROCEDURE — 96376 TX/PRO/DX INJ SAME DRUG ADON: CPT

## 2020-03-15 PROCEDURE — 36415 COLL VENOUS BLD VENIPUNCTURE: CPT

## 2020-03-15 PROCEDURE — 96372 THER/PROPH/DIAG INJ SC/IM: CPT | Mod: 59

## 2020-03-15 RX ORDER — CALCIUM CARBONATE 200(500)MG
500 TABLET,CHEWABLE ORAL 3 TIMES DAILY PRN
Status: DISCONTINUED | OUTPATIENT
Start: 2020-03-15 | End: 2020-03-16 | Stop reason: HOSPADM

## 2020-03-15 RX ORDER — CEFTRIAXONE 1 G/1
1 INJECTION, POWDER, FOR SOLUTION INTRAMUSCULAR; INTRAVENOUS
Status: DISCONTINUED | OUTPATIENT
Start: 2020-03-15 | End: 2020-03-16 | Stop reason: HOSPADM

## 2020-03-15 RX ADMIN — HYDRALAZINE HYDROCHLORIDE 100 MG: 50 TABLET ORAL at 08:03

## 2020-03-15 RX ADMIN — CEFTRIAXONE SODIUM 1 G: 1 INJECTION, POWDER, FOR SOLUTION INTRAMUSCULAR; INTRAVENOUS at 09:03

## 2020-03-15 RX ADMIN — HEPARIN SODIUM 5000 UNITS: 5000 INJECTION, SOLUTION INTRAVENOUS; SUBCUTANEOUS at 09:03

## 2020-03-15 RX ADMIN — HYDRALAZINE HYDROCHLORIDE 100 MG: 50 TABLET ORAL at 09:03

## 2020-03-15 RX ADMIN — SEVELAMER CARBONATE 1600 MG: 800 TABLET, FILM COATED ORAL at 05:03

## 2020-03-15 RX ADMIN — HEPARIN SODIUM 5000 UNITS: 5000 INJECTION, SOLUTION INTRAVENOUS; SUBCUTANEOUS at 05:03

## 2020-03-15 RX ADMIN — METOPROLOL SUCCINATE 50 MG: 50 TABLET, EXTENDED RELEASE ORAL at 08:03

## 2020-03-15 RX ADMIN — SEVELAMER CARBONATE 1600 MG: 800 TABLET, FILM COATED ORAL at 08:03

## 2020-03-15 RX ADMIN — HEPARIN SODIUM 5000 UNITS: 5000 INJECTION, SOLUTION INTRAVENOUS; SUBCUTANEOUS at 01:03

## 2020-03-15 RX ADMIN — OLMESARTAN MEDOXOMIL 40 MG: 40 TABLET, FILM COATED ORAL at 08:03

## 2020-03-15 RX ADMIN — AMLODIPINE BESYLATE 10 MG: 10 TABLET ORAL at 08:03

## 2020-03-15 RX ADMIN — AZITHROMYCIN DIHYDRATE 500 MG: 500 INJECTION, POWDER, LYOPHILIZED, FOR SOLUTION INTRAVENOUS at 09:03

## 2020-03-15 RX ADMIN — ISOSORBIDE MONONITRATE 30 MG: 30 TABLET, EXTENDED RELEASE ORAL at 08:03

## 2020-03-15 RX ADMIN — SEVELAMER CARBONATE 1600 MG: 800 TABLET, FILM COATED ORAL at 01:03

## 2020-03-15 RX ADMIN — CALCIUM CARBONATE (ANTACID) CHEW TAB 500 MG 500 MG: 500 CHEW TAB at 11:03

## 2020-03-15 NOTE — PLAN OF CARE
Problem: Fall Injury Risk  Goal: Absence of Fall and Fall-Related Injury  Outcome: Ongoing, Not Progressing     Problem: Adult Inpatient Plan of Care  Goal: Plan of Care Review  Outcome: Ongoing, Not Progressing     Problem: Adult Inpatient Plan of Care  Goal: Optimal Comfort and Wellbeing  Outcome: Ongoing, Not Progressing     Problem: Infection  Goal: Infection Symptom Resolution  Outcome: Ongoing, Not Progressing       AAOx4. Continues on isolation precautions. No signs ans symptoms of respiratory distress. Improved endurance. Denies discomfort. Vital signs monitored; wnl at present time. Remains afebrile.

## 2020-03-15 NOTE — PROGRESS NOTES
Hospital Medicine  Progress Note  [facility]      Patient Name: Eva Bloom  MRN:  [id number]  Hospital Medicine Team: [facility]OSP MED S Sowmya Watson MD  Date of Admission:  3/14/2020     Length of Stay:  LOS: 0 days       Principal Problem:  Hypertensive emergency      Hospital Course:  Patient admitted for evaluation of possible COVID-19.    Interval History:  Patient feeling significantly better      Review of Systems:  Respiratory: no issues  Cardiovascular: no issues    Inpatient Medications:    Current Facility-Administered Medications:     acetaminophen tablet 650 mg, 650 mg, Oral, Q4H PRN, Johanna Martin PA-C    albuterol inhaler 1 puff, 1 puff, Inhalation, Q4H PRN, Johanna Martin PA-C    amLODIPine tablet 10 mg, 10 mg, Oral, Daily, Sowmya Watson MD, 10 mg at 03/15/20 0847    azithromycin 500 mg in dextrose 5 % 250 mL IVPB (ready to mix system), 500 mg, Intravenous, Q24H, Sowmya Watson MD, Last Rate: 250 mL/hr at 03/14/20 2015, 500 mg at 03/14/20 2015    benzonatate capsule 200 mg, 200 mg, Oral, TID PRN, Johanna Martin PA-C    bisacodyL suppository 10 mg, 10 mg, Rectal, Daily PRN, Johanna Martin PA-C    cloNIDine 0.2 mg/24 hr td ptwk 1 patch, 1 patch, Transdermal, Q7 Days, Johanna Martin PA-C    dextrose 10% (D10W) Bolus, 12.5 g, Intravenous, PRN, WAI Albrecht-RODRÍGUEZ    dextrose 10% (D10W) Bolus, 25 g, Intravenous, PRN, WAI Albrecht-RODRÍGUEZ    glucagon (human recombinant) injection 1 mg, 1 mg, Intramuscular, PRN, Johanna Martin PA-C    glucose chewable tablet 16 g, 16 g, Oral, PRN, WAI Albrecht-RODRÍGUEZ    glucose chewable tablet 24 g, 24 g, Oral, PRN, WAI Albrecht-C    heparin (porcine) injection 5,000 Units, 5,000 Units, Subcutaneous, Q8H, Johanna Martin PA-C, 5,000 Units at 03/15/20 1317    hydrALAZINE tablet 100 mg, 100 mg, Oral, Q12H, Johanna Martin PA-C, 100 mg at 03/15/20 0847    isosorbide mononitrate 24 hr tablet 30 mg, 30 mg, Oral,  "Daily, Johanna Martin PA-C, 30 mg at 03/15/20 0846    lidocaine-prilocaine cream, , Topical (Top), Once per day on Mon Wed Fri, Johanna Martin PA-C, Stopped at 03/14/20 1145    metoprolol succinate (TOPROL-XL) 24 hr tablet 50 mg, 50 mg, Oral, Daily, Johanna Martin PA-C, 50 mg at 03/15/20 0847    NON FORMULARY MEDICATION 1 tablet, 1 tablet, Oral, Daily, Johanna Martin PA-C    olmesartan tablet 40 mg, 40 mg, Oral, Daily, Joahnna Martin PA-C, 40 mg at 03/15/20 0847    polyethylene glycol packet 17 g, 17 g, Oral, BID PRN, Johanna Martin PA-C    promethazine (PHENERGAN) 12.5 mg in dextrose 5 % 50 mL IVPB, 12.5 mg, Intravenous, Q6H PRN, Johanna Martin PA-C    sevelamer carbonate tablet 1,600 mg, 1,600 mg, Oral, TID WM, Johanna Martin PA-C, 1,600 mg at 03/15/20 1317      Physical Exam:      Intake/Output Summary (Last 24 hours) at 3/15/2020 1442  Last data filed at 3/15/2020 1300  Gross per 24 hour   Intake 2040 ml   Output 3608 ml   Net -1568 ml     Wt Readings from Last 3 Encounters:   03/15/20 60 kg (132 lb 4.4 oz)   01/13/20 57.6 kg (126 lb 15.8 oz)   01/08/20 57.4 kg (126 lb 8.7 oz)       BP (!) 152/75 (BP Location: Right arm, Patient Position: Sitting)   Pulse 71   Temp 97.1 °F (36.2 °C) (Oral)   Resp 17   Ht 5' 4" (1.626 m)   Wt 60 kg (132 lb 4.4 oz)   LMP 10/24/2016   SpO2 95%   Breastfeeding? No   BMI 22.71 kg/m²     GEN: NAD, conversant  Resp: CTAB, no wheezes or rales, normal work of breathing   CV: RRR, no m/r/g, no edema  GI: soft, NTND    Laboratory:    Recent Labs   Lab 03/13/20  0927 03/14/20  0437 03/15/20  0550   WBC 5.16 4.96 4.14   HGB 11.1* 10.9* 10.2*   HCT 37.2 35.4* 32.7*    168 139*     Recent Labs   Lab 03/13/20  0927 03/14/20  0528 03/15/20  0549    142 141   K 4.4 4.5 4.5    102 105   CO2 27 27 24   BUN 43* 63* 40*   CREATININE 7.7* 9.2* 6.8*    95 82   CALCIUM 9.8 9.9 9.0   MG  --   --  2.2   PHOS  --   --  5.0*     Recent Labs   Lab " 03/13/20  0927 03/14/20  0437 03/15/20  0549   ALKPHOS 124  --   --    ALT 60*  --   --    AST 63*  --   --    ALBUMIN 3.2*  --  2.6*   PROT 6.6  --   --    BILITOT 0.7  --   --    INR  --  1.1  --       No results for input(s): POCTGLUCOSE in the last 168 hours.  Recent Labs     03/14/20 0437 03/14/20  0818   TROPONINI 0.112* 0.121*       No results for input(s): LACTATE in the last 72 hours.     No results for input(s): POCTGLUCOSE in the last 168 hours.  Lab Results   Component Value Date    HGBA1C 4.4 01/08/2020         Microbiology:  Microbiology Results (last 7 days)     Procedure Component Value Units Date/Time    Blood culture [[id number]] Collected:  03/14/20 1944    Order Status:  Completed Specimen:  Blood Updated:  03/15/20 0315     Blood Culture, Routine No Growth to date    Culture, Respiratory with Gram Stain [[id number]]     Order Status:  No result Specimen:  Respiratory     Culture, Respiratory with Gram Stain [[id number]]     Order Status:  Canceled Specimen:  Respiratory     Respiratory Infection Panel (PCR), Nasopharyngeal [[id number]]  (Abnormal) Collected:  03/14/20 0856    Order Status:  Completed Specimen:  Nasopharyngeal Swab Updated:  03/14/20 1438     Respiratory Infection Panel Source NP Swab     Adenovirus Not Detected     Coronavirus 229E, Common Cold Virus Not Detected     Coronavirus HKU1, Common Cold Virus Not Detected     Coronavirus NL63, Common Cold Virus Not Detected     Coronavirus OC43, Common Cold Virus Not Detected     Comment: The Coronavirus strains detected in this test cause the common cold.  These strains are not the COVID-19 (novel Coronavirus)strain   associated with the respiratory disease outbreak.          Human Metapneumovirus Not Detected     Human Rhinovirus/Enterovirus Detected     Influenza A (subtypes H1, H1-2009,H3) Not Detected     Influenza B Not Detected     Parainfluenza Virus 1 Not Detected     Parainfluenza Virus 2 Not Detected     Parainfluenza Virus 3  Not Detected     Parainfluenza Virus 4 Not Detected     Respiratory Syncytial Virus Not Detected     Bordetella Parapertussis (DN9319) Not Detected     Bordetella pertussis (ptxP) Not Detected     Chlamydia pneumoniae Not Detected     Mycoplasma pneumoniae Not Detected     Comment: Respiratory Infection Panel testing performed by Multiplex PCR.       Narrative:       Nasal Swab Only - for all other respiratory sources, order  [id number] - Respiratory Viral Panel by PCR (RSPFA)    Influenza A & B by Molecular [[id number]] Collected:  03/14/20 0856    Order Status:  Completed Specimen:  Nasopharyngeal Swab Updated:  03/14/20 0927     Influenza A, Molecular Negative     Influenza B, Molecular Negative     Flu A & B Source Nasal swab            Imaging  ECG Results          EKG 12-lead (Final result)  Result time 03/14/20 19:49:14    Final result by Interface, Lab In [facility] (03/14/20 19:49:14)                 Narrative:    Test Reason : R06.02,    Vent. Rate : 094 BPM     Atrial Rate : 094 BPM     P-R Int : 150 ms          QRS Dur : 086 ms      QT Int : 388 ms       P-R-T Axes : 079 057 098 degrees     QTc Int : 485 ms    Normal sinus rhythm  Nonspecific ST and T wave abnormality  Prolonged QT  Abnormal ECG  When compared with ECG of 02-JUN-2017 09:12,  Previous ECG has undetermined rhythm, needs review  T wave inversion no longer evident in Anterior leads  Confirmed by PERCY RICHARD MD ([id number]) on 3/14/2020 7:49:02 PM    Referred By: [email or web address]   SELF           Confirmed By:PERCY RICHARD MD                              No results found for this or any previous visit.    X-Ray Chest AP Portable  Narrative: EXAMINATION:  XR CHEST AP PORTABLE    CLINICAL HISTORY:  shortness of breath;    TECHNIQUE:  Single frontal view of the chest was performed.    COMPARISON:  April 5, 2017    FINDINGS:  Single chest view is submitted.  The cardiomediastinal silhouette appears prominent, likely exaggerated by portable technique, with baseline  prominence appearing similar to the prior study.  There is mild prominence of the pulmonary vascular bilaterally with mild interstitial and alveolar infiltrate.  There is no evidence for significant pleural effusion or pneumothorax.  The osseous structures demonstrate chronic change.  Impression: Bilateral appearance of prominence of the pulmonary vascular with interstitial and alveolar infiltrate correlation for pulmonary vascular congestion and edema is needed.    Electronically signed by: Rodrigue Adam  Date:    03/14/2020  Time:    05:53        Assessment and Plan:    Active Hospital Problems    Diagnosis  POA    *Hypertensive emergency [I16.1]  Yes    Rhinovirus infection [B34.8]  Yes    SOB (shortness of breath) [R06.02]  Yes    Cough [R05]  Yes    Suspected Covid-19 Virus Infection [R68.89]  Yes    ESRD (end stage renal disease) on dialysis [N18.6, Z99.2]  Not Applicable     - Tuesday, Thursday, and Saturday  - Chelsie Hart, nephrology      Combined systolic and diastolic cardiac dysfunction [I51.89]  Yes    Pulmonary hypertension [I27.20]  Yes    History of kidney cancer [Z85.528]  Not Applicable    Essential hypertension [I10]  Yes    Hyperparathyroidism, secondary renal [N25.81]  Yes      Resolved Hospital Problems   No resolved problems to display.       Viral Pneumonia  Person under investigation for COVID-19  - COVID-19 testing sent.  - Infection Control notified  - Isolation:   - Airborne and Droplet Precautions  - N95 masks must be fit tested, wear eye protection  - 20 second hand hygiene   - Limit visitors per hospital policy  - Diagnostics (leukopenia, hyponatremia, hyperferritinemia, elevated troponin, elevated d-dimer, age, and comorbidities are significant predictors of poor clinical outcome)   - CBC, CMP, stable   - ECG without acute changes   - rapid Flu negative    - RIP  + rhinovirus   - Legionella antigen not indicated   - Blood culture NGTD   - Portable CXR 3/14:  Bilateral appearance of prominence of the pulmonary vascular with interstitial and alveolar infiltrate    - UA and culture- no urine produced.   - Management:   - Supplemental O2 to maintain SpO2 >92%   - Telemetry & Continuous Pulse Ox   - albuterol INHALER PRN (avoid nebulization of secretions)   - No BiPAP to avoid aerosolization (including home BiPAP)   - No steroids unless septic shock due to increased viral replication   - fluid sparing resuscitation   - Empiric antibiotics per likely source & patient allergies    - CAP: azithromycin & ceftriaxone      Patient's chronic/stable medical conditions noted in the progress note above will be managed with the patient's home medications as tolerated.     HTN urgency: BP currently at GOAL  Discussed HD needs with nephrology service.     Sowmya Watson   Department of Hospital Medicine  [facility]

## 2020-03-15 NOTE — PLAN OF CARE
Plan of care:     54 y.o. female with significant past medical history of ESRD on HD TuThSat, h/o renal cell carcinoma, combined CHF (echo 2017 EF 35-40%), HTN presented to the ER complaining of SOB x 1 week. On presentation found with low grade fever, cough, bilateral pulmonary infiltrates on Xray in conjunction with hypertensive urgency. Restarted on home BP medications and completed 3 hours session of dialysis with better BP control. Concern for infectious etiology.     Lab review:   Elevated BNP  Hg at baseline.  CRP 3.5  BNP > 4500  Procalcitonin 0.61  RIP: + rhinovirus.   Flu negative    Plan:   - transition to [facility] to complete COVID testing. Precautions and testing ordered  - CAP treatment pending results  - supportive management for renal disease and hypertension maximization  - volume removal with dialysis  - will hold off on cardiac echo at this interval unless clinical decompensation.     Sowmya Watson MD

## 2020-03-15 NOTE — PLAN OF CARE
Problem: Fall Injury Risk  Goal: Absence of Fall and Fall-Related Injury  Outcome: Ongoing, Not Progressing     Problem: Adult Inpatient Plan of Care  Goal: Plan of Care Review  Outcome: Ongoing, Not Progressing     Problem: Hemodynamic Instability (Hemodialysis)  Goal: Vital Signs Remain in Desired Range  Outcome: Ongoing, Not Progressing     Problem: Infection  Goal: Infection Symptom Resolution  Outcome: Ongoing, Not Progressing    AAOx4 and stable. Droplet and contact precautions initiated per MD for further eval. Remains afebrile. Continues with nonproductive cough.

## 2020-03-15 NOTE — PLAN OF CARE
Problem: Adult Inpatient Plan of Care  Goal: Plan of Care Review  Outcome: Ongoing, Progressing   No acute events throughout shift.  Vitals and assessment completed as ordered. Pt calm and cooperative. No complains of pain. Pt free from injury or falls

## 2020-03-16 VITALS
BODY MASS INDEX: 22.58 KG/M2 | WEIGHT: 132.25 LBS | DIASTOLIC BLOOD PRESSURE: 89 MMHG | HEART RATE: 79 BPM | SYSTOLIC BLOOD PRESSURE: 164 MMHG | OXYGEN SATURATION: 98 % | RESPIRATION RATE: 18 BRPM | TEMPERATURE: 98 F | HEIGHT: 64 IN

## 2020-03-16 LAB
ALBUMIN SERPL BCP-MCNC: 2.8 G/DL (ref 3.5–5.2)
ANION GAP SERPL CALC-SCNC: 16 MMOL/L (ref 8–16)
BASOPHILS # BLD AUTO: 0.06 K/UL (ref 0–0.2)
BASOPHILS NFR BLD: 1.2 % (ref 0–1.9)
BUN SERPL-MCNC: 60 MG/DL (ref 6–20)
CALCIUM SERPL-MCNC: 9.6 MG/DL (ref 8.7–10.5)
CHLORIDE SERPL-SCNC: 104 MMOL/L (ref 95–110)
CO2 SERPL-SCNC: 22 MMOL/L (ref 23–29)
CREAT SERPL-MCNC: 9.8 MG/DL (ref 0.5–1.4)
DIFFERENTIAL METHOD: ABNORMAL
EOSINOPHIL # BLD AUTO: 0.2 K/UL (ref 0–0.5)
EOSINOPHIL NFR BLD: 4.5 % (ref 0–8)
ERYTHROCYTE [DISTWIDTH] IN BLOOD BY AUTOMATED COUNT: 16.2 % (ref 11.5–14.5)
EST. GFR  (AFRICAN AMERICAN): 4.6 ML/MIN/1.73 M^2
EST. GFR  (NON AFRICAN AMERICAN): 4 ML/MIN/1.73 M^2
GLUCOSE SERPL-MCNC: 102 MG/DL (ref 70–110)
HCT VFR BLD AUTO: 34.2 % (ref 37–48.5)
HGB BLD-MCNC: 10.6 G/DL (ref 12–16)
IMM GRANULOCYTES # BLD AUTO: 0.01 K/UL (ref 0–0.04)
IMM GRANULOCYTES NFR BLD AUTO: 0.2 % (ref 0–0.5)
LYMPHOCYTES # BLD AUTO: 1.5 K/UL (ref 1–4.8)
LYMPHOCYTES NFR BLD: 30.6 % (ref 18–48)
MAGNESIUM SERPL-MCNC: 2.4 MG/DL (ref 1.6–2.6)
MCH RBC QN AUTO: 32.9 PG (ref 27–31)
MCHC RBC AUTO-ENTMCNC: 31 G/DL (ref 32–36)
MCV RBC AUTO: 106 FL (ref 82–98)
MONOCYTES # BLD AUTO: 0.5 K/UL (ref 0.3–1)
MONOCYTES NFR BLD: 9.3 % (ref 4–15)
NEUTROPHILS # BLD AUTO: 2.6 K/UL (ref 1.8–7.7)
NEUTROPHILS NFR BLD: 54.2 % (ref 38–73)
NRBC BLD-RTO: 0 /100 WBC
PHOSPHATE SERPL-MCNC: 6.1 MG/DL (ref 2.7–4.5)
PLATELET # BLD AUTO: 183 K/UL (ref 150–350)
PMV BLD AUTO: 11.6 FL (ref 9.2–12.9)
POTASSIUM SERPL-SCNC: 4.8 MMOL/L (ref 3.5–5.1)
RBC # BLD AUTO: 3.22 M/UL (ref 4–5.4)
SODIUM SERPL-SCNC: 142 MMOL/L (ref 136–145)
WBC # BLD AUTO: 4.84 K/UL (ref 3.9–12.7)

## 2020-03-16 PROCEDURE — 99217 PR OBSERVATION CARE DISCHARGE: ICD-10-PCS | Mod: ,,, | Performed by: PHYSICIAN ASSISTANT

## 2020-03-16 PROCEDURE — 99217 PR OBSERVATION CARE DISCHARGE: CPT | Mod: ,,, | Performed by: PHYSICIAN ASSISTANT

## 2020-03-16 PROCEDURE — 25000003 PHARM REV CODE 250: Performed by: HOSPITALIST

## 2020-03-16 PROCEDURE — 25000003 PHARM REV CODE 250: Performed by: PHYSICIAN ASSISTANT

## 2020-03-16 PROCEDURE — 36415 COLL VENOUS BLD VENIPUNCTURE: CPT

## 2020-03-16 PROCEDURE — 96372 THER/PROPH/DIAG INJ SC/IM: CPT

## 2020-03-16 PROCEDURE — 80069 RENAL FUNCTION PANEL: CPT

## 2020-03-16 PROCEDURE — 85025 COMPLETE CBC W/AUTO DIFF WBC: CPT

## 2020-03-16 PROCEDURE — 83735 ASSAY OF MAGNESIUM: CPT

## 2020-03-16 PROCEDURE — 63600175 PHARM REV CODE 636 W HCPCS: Performed by: PHYSICIAN ASSISTANT

## 2020-03-16 PROCEDURE — G0378 HOSPITAL OBSERVATION PER HR: HCPCS

## 2020-03-16 RX ORDER — AZITHROMYCIN 500 MG/1
500 TABLET, FILM COATED ORAL NIGHTLY
Qty: 3 TABLET | Refills: 0 | Status: SHIPPED | OUTPATIENT
Start: 2020-03-16 | End: 2020-03-19

## 2020-03-16 RX ORDER — ISOSORBIDE MONONITRATE 30 MG/1
60 TABLET, EXTENDED RELEASE ORAL DAILY
Qty: 90 TABLET | Refills: 3 | Status: SHIPPED | OUTPATIENT
Start: 2020-03-16 | End: 2020-06-01 | Stop reason: ALTCHOICE

## 2020-03-16 RX ORDER — AZITHROMYCIN 250 MG/1
500 TABLET, FILM COATED ORAL NIGHTLY
Status: DISCONTINUED | OUTPATIENT
Start: 2020-03-16 | End: 2020-03-16 | Stop reason: HOSPADM

## 2020-03-16 RX ADMIN — AMLODIPINE BESYLATE 10 MG: 10 TABLET ORAL at 09:03

## 2020-03-16 RX ADMIN — HEPARIN SODIUM 5000 UNITS: 5000 INJECTION, SOLUTION INTRAVENOUS; SUBCUTANEOUS at 05:03

## 2020-03-16 RX ADMIN — ISOSORBIDE MONONITRATE 30 MG: 30 TABLET, EXTENDED RELEASE ORAL at 09:03

## 2020-03-16 RX ADMIN — METOPROLOL SUCCINATE 50 MG: 50 TABLET, EXTENDED RELEASE ORAL at 09:03

## 2020-03-16 RX ADMIN — OLMESARTAN MEDOXOMIL 40 MG: 40 TABLET, FILM COATED ORAL at 09:03

## 2020-03-16 RX ADMIN — HYDRALAZINE HYDROCHLORIDE 100 MG: 50 TABLET ORAL at 09:03

## 2020-03-16 NOTE — NURSING
Pt. DC to home/self care.   AVS/Covid-19 instructions reviewed.   IV removed.   Pt. refused WC and left unit ambulating.   Pt. will go to pharmacy and  the med, and spouse will provide a ride home.

## 2020-03-16 NOTE — PLAN OF CARE
Patient remained free of falls and injuries during shift.  Patient took medications without incident.  Airborne, droplet, and contact precautions maintained.  No other concerns noted.

## 2020-03-16 NOTE — PLAN OF CARE
CM called patient for discharge planning.  Patient states she lives with her spouse in a one story house with no steps to enter.  Patient is independent with ADL's.  Plan is to discharge home with spouse.    Pharmacy:    [id number] - [place] - [address]  [address] [address]  Phone: [phone] Fax: [phone]    PCP:  Sowmya Mccain MD    Payor: [facility] SHIELD / Plan: BCBS ALL OUT OF STATE / Product Type: PPO /      03/16/20 1349   Discharge Assessment   Assessment Type Discharge Planning Assessment   Confirmed/corrected address and phone number on facesheet? Yes   Assessment information obtained from? Patient   Expected Length of Stay (days) 3   Communicated expected length of stay with patient/caregiver yes   Prior to hospitilization cognitive status: Alert/Oriented   Prior to hospitalization functional status: Independent   Current cognitive status: Alert/Oriented   Current Functional Status: Independent   Facility Arrived From: Emergency room   Lives With spouse   Able to Return to Prior Arrangements yes   Is patient able to care for self after discharge? Unable to determine at this time (comments)   Patient's perception of discharge disposition home or selfcare   Readmission Within the Last 30 Days no previous admission in last 30 days   Patient currently being followed by outpatient case management? No   Patient currently receives any other outside agency services? No   Equipment Currently Used at Home none   Do you have any problems affording any of your prescribed medications? No   Is the patient taking medications as prescribed? yes   Does the patient have transportation home? Yes   Transportation Anticipated family or friend will provide   Dialysis Name and Scheduled days [facility] in [place] on T, Th, Sa at 5:30 AM   Does the patient receive services at the [facility]mad[facility] Clinic? No   Discharge Plan A Home with family   Discharge Plan B Home Health   DME  Needed Upon Discharge  none   Patient/Family in Agreement with Plan yes

## 2020-03-16 NOTE — DISCHARGE INSTRUCTIONS
[place] [facility]  Preventing the Spread of Coronavirus Disease 2019 in Homes and Residential Communities    Prevention steps for people with confirmed or suspected COVID-19 (including persons under  investigation) who do not need to be hospitalized and people with confirmed COVID-19 who were  hospitalized and determined to be medically stable to go home.    Your healthcare provider and public health staff will evaluate whether you can be cared for at home.   Stay home except to get medical care.   Separate yourself from other people and animals in your home   Call ahead before visiting your doctor.   Wear a facemask.   Cover your coughs and sneezes.   Clean your hands often.   Avoid sharing personal household items.   Clean all high-touch surfaces every day.   Monitor your symptoms. Seek prompt medical attention if your illness is worsening (e.g., difficulty breathing). Before seeking care, call your healthcare provider.   If you have a medical emergency and need to call 911, notify the dispatch personnel that you   have, or are being evaluated for COVID-19. If possible, put on a facemask before emergency   medical services arrive.   Discontinuing home isolation. Call your provider about guidance to discontinue home isolation.    Recommended precautions for household members, intimate partners, and caregivers in a nonhealthcare setting of a patient with symptomatic laboratory-confirmed COVID-19 or a patient under investigation.  Household members, intimate partners, and caregivers in a nonhealthcare setting may have close  contact with a person with symptomatic, laboratory-confirmed COVID-19 or a person under  investigation. Close contacts should monitor their health; they should call their healthcare provider right  away if they develop symptoms suggestive of COVID-19 (e.g., fever, cough, shortness of breath).    Close contacts should also follow these  recommendations:   Make sure that you understand and can help the patient follow their healthcare provider's instructions for medication(s) and care. You should help the patient with basic needs in the home and provide support for getting groceries, prescriptions, and other personal needs.   Monitor the patient's symptoms. If the patient is getting sicker, call his or her healthcare provider and tell them that the patient has laboratory-confirmed COVID-19. This will help the healthcare provider's office take steps to keep other people in the office or waiting room from getting infected. Ask the healthcare provider to call the local or [facility] health department for additional guidance. If the patient has a medical emergency and you need to call 911, notify the dispatch personnel that the patient has, or is being evaluated for COVID-19.   Household members should stay in another room or be  from the patient as much as possible. Household members should use a separate bedroom and bathroom, if available.   Prohibit visitors who do not have an essential need to be in the home.   Household members should care for any pets in the home. Do not handle pets or other animals while sick.   Make sure that shared spaces in the home have good air flow, such as by an air conditioner or an opened window, weather permitting.   Perform hand hygiene frequently. Wash your hands often with soap and water for at least 20 seconds or use an alcohol-based hand  that contains 60 to 95% alcohol, covering all surfaces of your hands and rubbing them together until they feel dry. Soap and water should be used preferentially if hands are visibly dirty.   Avoid touching your eyes, nose, and mouth with unwashed hands.   The patient should wear a facemask when you are around other people. If the patient is not able to wear a facemask (for example, because it causes trouble breathing), you, as the caregiver should wear a mask  when you are in the same room as the patient.   Wear a disposable facemask and gloves when you touch or have contact with the patient's blood, stool, or body fluids, such as saliva, sputum, nasal mucus, vomit, urine.  o Throw out disposable facemasks and gloves after using them. Do not reuse.  o When removing personal protective equipment, first remove and dispose of gloves. Then, immediately clean your hands with soap and water or alcohol-based hand . Next, remove and dispose of facemask, and immediately clean your hands again with soap and water or alcohol-based hand .   Avoid sharing household items with the patient. You should not share dishes, drinking glasses, cups, eating utensils, towels, bedding, or other items. After the patient uses these items, you should wash them thoroughly (see below Wash laundry thoroughly).   Clean all high-touch surfaces, such as counters, tabletops, doorknobs, bathroom fixtures, toilets, phones, keyboards, tablets, and bedside tables, every day. Also, clean any surfaces that may have blood, stool, or body fluids on them.   Use a household cleaning spray or wipe, according to the label instructions. Labels contain instructions for safe and effective use of the cleaning product including precautions you should take when applying the product, such as wearing gloves and making sure you have good ventilation during use of the product.   Wash laundry thoroughly.  o Immediately remove and wash clothes or bedding that have blood, stool, or body fluids on them.  o Wear disposable gloves while handling soiled items and keep soiled items away from your body. Clean your hands (with soap and water or an alcohol-based hand ) immediately after removing your gloves.  o Read and follow directions on labels of laundry or clothing items and detergent. In general, using a normal laundry detergent according to washing machine instructions and dry thoroughly using  the warmest temperatures recommended on the clothing label.   Place all used disposable gloves, facemasks, and other contaminated items in a lined container before disposing of them with other household waste. Clean your hands (with soap and water or an alcohol-based hand ) immediately after handling these items. Soap and water should be used preferentially if hands are visibly dirty.   Discuss any additional questions with your state or local health department or healthcare provider. Check available hours when contacting your local health department.

## 2020-03-17 NOTE — PLAN OF CARE
Patient discharged to home.    Future Appointments   Date Time Provider Department Center   4/7/2020  1:30 PM FARIDEH Muñoz III, MD [facility] VASCSUR [facility]   4/20/2020  9:20 AM Sowmya Mccain MD [facility] FAM MED [place]   4/24/2020  9:00 AM EKG, APPT [facility] EKG [facility]   4/24/2020  9:20 AM Sowmya Stock NP [facility] CARDIO [facility]   5/8/2020 10:00 AM [facility]-MRI4 [facility]   5/8/2020 10:30 AM [facility]-CT2 500 LB LIMIT [facility]   5/11/2020 12:00 PM LAB, HEMONC CANCER BLDG [facility] LAB HO [facility]   5/11/2020  1:00 PM Ben Rivera MD [facility] HEM ONC [facility]        03/17/20 0738   Final Note   Assessment Type Final Discharge Note   Anticipated Discharge Disposition Home   Right Care Referral Info   Post Acute Recommendation No Care

## 2020-03-19 LAB — BACTERIA BLD CULT: NORMAL

## 2020-03-23 NOTE — DISCHARGE SUMMARY
Discharge Summary  Hospital Medicine    Attending Provider on Discharge: Emily Berger PA-C  [facility] Medicine Team: [facility]  Date of Admission:  3/14/2020     Date of Discharge:  3/16/2020    Active Hospital Problems    Diagnosis  POA    *Suspected Covid-19 Virus Infection [R68.89]  Yes    Rhinovirus infection [B34.8]  Yes    SOB (shortness of breath) [R06.02]  Yes    Hypertensive emergency [I16.1]  Yes    Cough [R05]  Yes    ESRD (end stage renal disease) on dialysis [N18.6, Z99.2]  Not Applicable     - Tuesday, Thursday, and Saturday  - Chelsie Hart, nephrology      Combined systolic and diastolic cardiac dysfunction [I51.89]  Yes    Pulmonary hypertension [I27.20]  Yes    History of kidney cancer [Z85.528]  Not Applicable    Essential hypertension [I10]  Yes    Hyperparathyroidism, secondary renal [N25.81]  Yes      Resolved Hospital Problems   No resolved problems to display.       History of the Present Illness:          Hospital Course of Principle Problem Addressed:       Viral Pneumonia  Person under investigation for COVID-19  - COVID-19 testing sent.  - Infection Control notified  - Isolation:   - Airborne and Droplet Precautions  - N95 masks must be fit tested, wear eye protection  - 20 second hand hygiene              - Limit visitors per hospital policy  - Diagnostics (leukopenia, hyponatremia, hyperferritinemia, elevated troponin, elevated d-dimer, age, and comorbidities are significant predictors of poor clinical outcome)              - CBC, CMP, stable              - ECG without acute changes              - rapid Flu negative               - RIP  + rhinovirus              - Legionella antigen not indicated              - Blood culture NGTD              - Portable CXR 3/14: Bilateral appearance of prominence of the pulmonary vascular with interstitial and alveolar infiltrate               - UA and culture- no urine produced.   - Management:              -  Supplemental O2 to maintain SpO2 >92%              - Telemetry & Continuous Pulse Ox              - albuterol INHALER PRN (avoid nebulization of secretions)              - No BiPAP to avoid aerosolization (including home BiPAP)              - No steroids unless septic shock due to increased viral replication              - fluid sparing resuscitation              - Empiric antibiotics per likely source & patient allergies                          - CAP: azithromycin & ceftriaxone    - patient discharged with her Covid test pending    Other Medical Problems Addressed in the Hospital:     HTN Urgency, resolved with BP meds and HD    Significant diagnostic tests     No results for input(s): WBC, HGB, HCT, PLT in the last 168 hours.  No results for input(s): NA, K, CL, CO2, BUN, CREATININE, CALCIUM, MG, PHOS, PROT, BILITOT, ALKPHOS, ALT, AST, GLUCOSE in the last 168 hours.    Invalid input(s): LABALBU    Other diagnostic tests     Special Treatments/ Procedures:      none    Discharge Medications:        Discharge Medication List as of 3/16/2020  2:34 PM      START taking these medications    Details   azithromycin (ZITHROMAX) 500 MG tablet Take 1 tablet (500 mg total) by mouth every evening. for 3 days, Starting Mon 3/16/2020, Until Thu 3/19/2020, Normal         CONTINUE these medications which have CHANGED    Details   isosorbide mononitrate (IMDUR) 30 MG 24 hr tablet Take 2 tablets (60 mg total) by mouth once daily., Starting Mon 3/16/2020, Normal         CONTINUE these medications which have NOT CHANGED    Details   acetaminophen (TYLENOL) 500 MG tablet Take 500 mg by mouth every 6 (six) hours as needed for Pain., Until Discontinued, Historical Med      albuterol (PROVENTIL/VENTOLIN HFA) 90 mcg/actuation inhaler Inhale 1-2 puffs into the lungs every 4 to 6 hours as needed for Wheezing or Shortness of Breath (chest tightness)., Starting Tue 11/26/2019, Normal      aluminum-magnesium hydroxide-simethicone (MAALOX)  200-200-20 mg/5 mL Susp Take 30 mLs by mouth every 6 (six) hours as needed. , Until Discontinued, Historical Med      amLODIPine (NORVASC) 5 MG tablet TAKE 1 TABLET BY MOUTH EVERY DAY, Normal      benzonatate (TESSALON) 200 MG capsule Take 1 capsule (200 mg total) by mouth 3 (three) times daily as needed for Cough., Starting Tue 11/26/2019, Normal      cabozantinib (CABOMETYX) 60 mg Tab Take 60 mg by mouth once daily., Starting Thu 2/6/2020, Normal      cloNIDine 0.2 mg/24 hr td ptwk (CATAPRES) 0.2 mg/24 hr PLACE 1 PATCH ONTO THE SKIN EVERY 7 DAYS., Starting Tue 7/30/2019, Normal      febuxostat (ULORIC) 40 mg Tab Take 1 tablet (40 mg total) by mouth once daily., Starting 1/27/2017, Until Discontinued, Normal      hydrALAZINE (APRESOLINE) 100 MG tablet TAKE 1 TABLET BY MOUTH EVERY 12 HOURS, Normal      inhalation spacing device Use as directed for inhalation., Normal      lidocaine-prilocaine (EMLA) cream APPLY ATLEAST 30 MINUTES BEFORE TREATMENT 3 TIMES A WEEK, Normal      metoprolol succinate (TOPROL-XL) 50 MG 24 hr tablet TAKE 1 TABLET BY MOUTH EVERY DAY, Normal      multivitamin-Ca-iron-minerals (ONE-A-DAY WOMENS FORMULA) 27-0.4 mg Tab Take by mouth. 1 Tablet Oral Every day, Until Discontinued, Historical Med      olmesartan (BENICAR) 40 MG tablet TAKE 1 TABLET BY MOUTH EVERY DAY, Normal      ondansetron (ZOFRAN-ODT) 8 MG TbDL Take 1 tablet (8 mg total) by mouth every 8 (eight) hours as needed., Starting Mon 9/16/2019, Until Tue 9/15/2020, Normal      psyllium husk-calcium (METAMUCIL PLUS CALCIUM) 1-60 gram-mg Cap Take by mouth. 2 Capsule Oral Every morning, Until Discontinued, Historical Med      sevelamer HCl (RENAGEL) 800 MG Tab Take 2 tablets (1,600 mg total) by mouth 3 (three) times daily with meals., Starting Tue 9/11/2018, Normal              Discharge Diet:cardiac diet and renal diet with Normal Fluid intake of 1500 - 2000 mL per day    Activity: activity as tolerated    Discharge Condition:  Good    Disposition: Home or Self Care    Tests pending at the time of discharge: none      Time spent  on the discharge of the patient including review of hospital course with the patient. reviewing discharge medications and arranging follow-up care 32 minutes    Discharge examination Patient was seen and examined on the date of discharge and determined to be suitable for discharge.    Future Appointments   Date Time Provider Department Center   4/7/2020  1:30 PM FARIDEH Muñoz III, MD [facility] VASCSUR [facility]   4/20/2020  9:20 AM Sowmya Mccain MD [facility] FAM MED [place]   4/24/2020  9:00 AM EKG, APPT [facility] EKG [facility]   4/24/2020  9:20 AM Sowmya Stock NP [facility] CARDIO [facility]   5/8/2020 10:00 AM [facility]-MRI4 [facility] MRI [facility]   5/8/2020 10:30 AM [facility]-CT2 500 LB LIMIT [facility] CTSC [facility]   5/11/2020 12:00 PM LAB, HEMONC CANCER BLDG [facility] LAB HO Hubert Lucio   5/11/2020  1:00 PM Ben Rivera MD [facility] HEM ONC Hubert Berger PA-C   Department of Hospital Medicine

## 2020-03-26 LAB — SARS-COV-2 RNA RESP QL NAA+PROBE: NOT DETECTED

## 2020-04-02 ENCOUNTER — TELEPHONE (OUTPATIENT)
Dept: VASCULAR SURGERY | Facility: CLINIC | Age: 55
End: 2020-04-02

## 2020-04-02 DIAGNOSIS — Z99.2 ESRD (END STAGE RENAL DISEASE) ON DIALYSIS: Primary | ICD-10-CM

## 2020-04-02 DIAGNOSIS — N18.6 ESRD (END STAGE RENAL DISEASE) ON DIALYSIS: Primary | ICD-10-CM

## 2020-04-30 ENCOUNTER — PATIENT MESSAGE (OUTPATIENT)
Dept: VASCULAR SURGERY | Facility: CLINIC | Age: 55
End: 2020-04-30

## 2020-05-08 ENCOUNTER — HOSPITAL ENCOUNTER (OUTPATIENT)
Dept: RADIOLOGY | Facility: HOSPITAL | Age: 55
Discharge: HOME | End: 2020-05-08
Attending: NURSE PRACTITIONER
Payer: MEDICARE

## 2020-05-08 ENCOUNTER — TELEPHONE (OUTPATIENT)
Dept: HEMATOLOGY/ONCOLOGY | Facility: CLINIC | Age: 55
End: 2020-05-08

## 2020-05-08 DIAGNOSIS — C64.9 METASTATIC RENAL CELL CARCINOMA, UNSPECIFIED LATERALITY: ICD-10-CM

## 2020-05-08 PROCEDURE — 71250 CT THORAX DX C-: CPT | Mod: 26,,, | Performed by: RADIOLOGY

## 2020-05-08 PROCEDURE — 71250 CT CHEST WITHOUT CONTRAST: ICD-10-PCS | Mod: 26,,, | Performed by: RADIOLOGY

## 2020-05-08 PROCEDURE — 74181 MRI ABDOMEN W/O CONTRAST: CPT | Mod: 26,,, | Performed by: RADIOLOGY

## 2020-05-08 PROCEDURE — 74181 MRI ABDOMEN W/O CONTRAST: CPT | Mod: TC

## 2020-05-08 PROCEDURE — 71250 CT THORAX DX C-: CPT | Mod: TC

## 2020-05-08 PROCEDURE — 74181 MRI ABDOMEN WITHOUT CONTRAST: ICD-10-PCS | Mod: 26,,, | Performed by: RADIOLOGY

## 2020-05-08 NOTE — TELEPHONE ENCOUNTER
Spoke to pt about visit with Dr. Rivera on Monday at 1pm, switched it over to audio visit, pt confirmed.

## 2020-05-08 NOTE — TELEPHONE ENCOUNTER
"----- Message from Sonia Narvaez sent at 5/8/2020  3:19 PM CDT -----  Name of caller: Eva   Provider name: Miguel Contreras MD  Contact Preference:  [phone]  Is this regarding current patient or new patient?: current   What is the nature of the call?    - pt returning a missed call, not clear what it was   concerning or who tried to reach her   Additional Notes:   "Thank you for all that you do for our patients'"     "

## 2020-05-11 ENCOUNTER — OFFICE VISIT (OUTPATIENT)
Dept: HEMATOLOGY/ONCOLOGY | Facility: CLINIC | Age: 55
End: 2020-05-11
Payer: COMMERCIAL

## 2020-05-11 DIAGNOSIS — N18.6 END STAGE RENAL FAILURE ON DIALYSIS: ICD-10-CM

## 2020-05-11 DIAGNOSIS — Z99.2 END STAGE RENAL FAILURE ON DIALYSIS: ICD-10-CM

## 2020-05-11 DIAGNOSIS — C79.89 METASTATIC RENAL CELL CARCINOMA TO INTRA-ABDOMINAL SITE: ICD-10-CM

## 2020-05-11 DIAGNOSIS — C64.2 METASTATIC RENAL CELL CARCINOMA, LEFT: ICD-10-CM

## 2020-05-11 DIAGNOSIS — C64.9 METASTATIC RENAL CELL CARCINOMA, UNSPECIFIED LATERALITY: Primary | ICD-10-CM

## 2020-05-11 DIAGNOSIS — C64.9 METASTATIC RENAL CELL CARCINOMA TO INTRA-ABDOMINAL SITE: ICD-10-CM

## 2020-05-11 PROCEDURE — 99214 PR OFFICE/OUTPT VISIT, EST, LEVL IV, 30-39 MIN: ICD-10-PCS | Mod: 95,,, | Performed by: INTERNAL MEDICINE

## 2020-05-11 PROCEDURE — 99214 OFFICE O/P EST MOD 30 MIN: CPT | Mod: 95,,, | Performed by: INTERNAL MEDICINE

## 2020-05-11 NOTE — PROGRESS NOTES
"Subjective:       Patient ID: Eva Bloom is a 54 y.o. female.    Chief Complaint: RCC    HPI     54 y.o.female, patient of Dr. Rivera, to clinic for 8 week follow up and to review labs/scans for recurrent metastatic renal cell carcinoma. Pt is currently taking cabozantinib daily since December 2016. Pt is on ESRD with HD every TTS. Patient tolerating treat extremely well with little to no side effects.    Today, she denies any mouth sores, nausea, vomiting, diarrhea, constipation,weight loss, chest pain, leg swelling, headache, dizziness, or mood changes.    ECOG 1. She is accompanied alone.    Oncologic History (From Chart and Patient):  Eva Bloom is a 54 y.o.female with ESRD on HD who was found to have two L renal masses. She underwent L lap nephrectomy on 1/12/16. 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 Margins were negative.  Shehealed from surgery well, but then developed a large ovarian mass.  This was removed by gyn along with multiple sites of metastatic disease in the pelvis.  Path was c/w recurrence of RCC.     11/20/16 Pelvic ultrasound reveals "Large heterogeneous cystic and solid mass which appears to arise from the right ovary with worrisome imaging features for malignancy.  Differential diagnosis includes malignant tumors such as serous or mucinous cystadenocarcinoma.  It is important to note that the patient is at risk for ovarian torsion due to the size of the mass.Multiple uterine fibroids are identified with the largest in the uterine fundus."    11/22/16 pathology reveals "FINAL PATHOLOGIC DIAGNOSIS-RIGHT OVARY AND FALLOPIAN TUBE, RIGHT SALPINGO-OOPHORECTOMY:  -Positive for malignancy, high grade carcinoma morphologically and immunohistochemically consistent with metastasis from the patient's known renal cell carcinoma"    1/23/17 MRI abdomen reveals "Status " "post left nephrectomy, the left nephrectomy bed appears normal.There is a small liver lesion and a small splenic lesion, they cannot be fully characterized but they are unchanged from 5/27/16 suggestive of benign lesions.Multiple right kidney cysts.Cholelithiasis.Soft tissue oval density noted within the mesentery adjacent to the aorta right iliac bifurcation, nonspecific, could represent an enlarged lymph node, it is unchanged from prior studies"    1/23/17- CT Chest reveals "Stable pulmonary nodules, likely benign. No new pulmonary parenchymal abnormalities are identified. Enlarging nodular foci in partially visualized left upper abdomen. Peritoneal implants not excluded. Multiple thyroid nodules, measuring up to 1.6 cm."    4/5/17-MRI abdomen reveals "Again seen are the T2 hyperintense hepatic and splenic lesions that appear similar to prior exam and possibly represents hemangiomas. There are right renal cysts and the patient is status post left nephrectomy."    7/3/17- MRI abdomen "Left nephrectomy.  Right renal cysts with no malignant features.Liver and splenic lesions consistent with cysts versus hemangiomas.Cholelithiasis.Right pleural effusion and right basal atelectasis.Small pericardial effusion.Body wall edema and mesenteric edema."  7/3/17- CT chest "In this patient with a history of left renal cell carcinoma, there are 2 right lung nodules which are stable compared to January 2017.  2 left lung nodules seen on that study are not well-seen today, may be related to a small amount of pleural fluid.Bilateral pleural effusions, mildly decreased compared to prior study.Small amount of pericardial fluid, new."    Review of Systems   Constitutional: Negative for activity change, appetite change, chills, fatigue and fever.   HENT: Negative for congestion, hearing loss, mouth sores, postnasal drip, sore throat, tinnitus and voice change.    Eyes: Negative for pain and visual disturbance.   Respiratory: Negative " for cough, shortness of breath and wheezing.    Cardiovascular: Negative for chest pain, palpitations and leg swelling.   Gastrointestinal: Negative for abdominal pain, constipation, diarrhea, nausea and vomiting.   Endocrine: Negative for cold intolerance and heat intolerance.   Genitourinary: Negative for difficulty urinating, dyspareunia, dysuria, frequency, menstrual problem, urgency, vaginal bleeding, vaginal discharge and vaginal pain.   Musculoskeletal: Negative for arthralgias and myalgias.   Skin: Negative for color change, rash and wound.   Allergic/Immunologic: Negative for environmental allergies and food allergies.   Neurological: Negative for weakness, numbness and headaches.   Hematological: Negative for adenopathy. Does not bruise/bleed easily.   Psychiatric/Behavioral: Negative for agitation, confusion, hallucinations and sleep disturbance. The patient is not nervous/anxious.    All other systems reviewed and are negative.      Allergies:  Review of patient's allergies indicates:   Allergen Reactions    Allopurinol      Other reaction(s): abnormal transaminases       Medications:  Current Outpatient Medications   Medication Sig Dispense Refill    acetaminophen (TYLENOL) 500 MG tablet Take 500 mg by mouth every 6 (six) hours as needed for Pain.      albuterol (PROVENTIL/VENTOLIN HFA) 90 mcg/actuation inhaler Inhale 1-2 puffs into the lungs every 4 to 6 hours as needed for Wheezing or Shortness of Breath (chest tightness). 1 Inhaler 6    aluminum-magnesium hydroxide-simethicone (MAALOX) 200-200-20 mg/5 mL Susp Take 30 mLs by mouth every 6 (six) hours as needed.       amLODIPine (NORVASC) 5 MG tablet TAKE 1 TABLET BY MOUTH EVERY DAY 90 tablet 3    benzonatate (TESSALON) 200 MG capsule Take 1 capsule (200 mg total) by mouth 3 (three) times daily as needed for Cough. (Patient not taking: Reported on 1/8/2020) 30 capsule 1    cabozantinib (CABOMETYX) 60 mg Tab Take 60 mg by mouth once daily. 30  tablet 11    cloNIDine 0.2 mg/24 hr td ptwk (CATAPRES) 0.2 mg/24 hr PLACE 1 PATCH ONTO THE SKIN EVERY 7 DAYS. 12 patch 3    febuxostat (ULORIC) 40 mg Tab Take 1 tablet (40 mg total) by mouth once daily. (Patient not taking: Reported on 1/8/2020) 30 tablet 4    hydrALAZINE (APRESOLINE) 100 MG tablet TAKE 1 TABLET BY MOUTH EVERY 12 HOURS 180 tablet 3    inhalation spacing device Use as directed for inhalation. 1 Device 0    isosorbide mononitrate (IMDUR) 30 MG 24 hr tablet Take 2 tablets (60 mg total) by mouth once daily. 90 tablet 3    lidocaine-prilocaine (EMLA) cream APPLY ATLEAST 30 MINUTES BEFORE TREATMENT 3 TIMES A WEEK 30 g 11    metoprolol succinate (TOPROL-XL) 50 MG 24 hr tablet TAKE 1 TABLET BY MOUTH EVERY DAY 90 tablet 3    multivitamin-Ca-iron-minerals (ONE-A-DAY WOMENS FORMULA) 27-0.4 mg Tab Take by mouth. 1 Tablet Oral Every day      olmesartan (BENICAR) 40 MG tablet TAKE 1 TABLET BY MOUTH EVERY DAY 90 tablet 3    ondansetron (ZOFRAN-ODT) 8 MG TbDL Take 1 tablet (8 mg total) by mouth every 8 (eight) hours as needed. 30 tablet 1    psyllium husk-calcium (METAMUCIL PLUS CALCIUM) 1-60 gram-mg Cap Take by mouth. 2 Capsule Oral Every morning      sevelamer HCl (RENAGEL) 800 MG Tab Take 2 tablets (1,600 mg total) by mouth 3 (three) times daily with meals. 600 tablet 3     No current facility-administered medications for this visit.        PMH:  Past Medical History:   Diagnosis Date    Anemia     Bronchitis 03/01/2017    Cancer 2016    kidney cancer    CMV (cytomegalovirus) antibody positive     Essential hypertension 9/23/2015    H/O herpes simplex type 2 infection     Herpes simplex type 1 antibody positive     History of kidney cancer     s/p left nephrectomy 1/2016    Hyperparathyroidism, unspecified     Hyperuricemia without signs of inflammatory arthritis and tophaceous disease     Kidney stones     LGSIL (low grade squamous intraepithelial dysplasia)     Myocardiopathy 7/21/2017     Prediabetes     Proteinuria     Renal disorder     Thyroid nodule     Urate nephropathy        PSH:  Past Surgical History:   Procedure Laterality Date    BREAST CYST EXCISION      COLONOSCOPY N/A 11/12/2015    NEPHRECTOMY-LAPAROSCOPIC Left 01/12/2016    PERITONEAL CATHETER INSERTION      Permacath insertion  01/12/2017    SALPINGOOPHORECTOMY Right 2016    KJB---DAVINCI    TONSILLECTOMY      TUBAL LIGATION         FamHx:  Family History   Problem Relation Age of Onset    Hypertension Mother     Diabetes Father     Kidney disease Father     No Known Problems Son     No Known Problems Son     Diabetes Maternal Grandfather     No Known Problems Sister     No Known Problems Brother     No Known Problems Maternal Grandmother     No Known Problems Paternal Grandmother     No Known Problems Paternal Grandfather     Heart disease Sister     No Known Problems Sister     No Known Problems Brother     No Known Problems Maternal Aunt     No Known Problems Maternal Uncle     No Known Problems Paternal Aunt     No Known Problems Paternal Uncle     Breast cancer Neg Hx     Colon cancer Neg Hx     Cancer Neg Hx     Stroke Neg Hx     Amblyopia Neg Hx     Blindness Neg Hx     Cataracts Neg Hx     Glaucoma Neg Hx     Macular degeneration Neg Hx     Retinal detachment Neg Hx     Strabismus Neg Hx     Thyroid disease Neg Hx        SocHx:  Social History     Socioeconomic History    Marital status:      Spouse name: Not on file    Number of children: 2    Years of education: Not on file    Highest education level: Not on file   Occupational History    Occupation: [employer]     Employer: [id number]   Social Needs    Financial resource strain: Not on file    Food insecurity:     Worry: Not on file     Inability: Not on file    Transportation needs:     Medical: Not on file     Non-medical: Not on file   Tobacco Use    Smoking status: Never Smoker    Smokeless tobacco:  Never Used   Substance and Sexual Activity    Alcohol use: No     Comment: . 2 children. works at [employer].    Drug use: No    Sexual activity: Yes     Partners: Male   Lifestyle    Physical activity:     Days per week: Not on file     Minutes per session: Not on file    Stress: Not on file   Relationships    Social connections:     Talks on phone: Not on file     Gets together: Not on file     Attends Restoration service: Not on file     Active member of club or organization: Not on file     Attends meetings of clubs or organizations: Not on file     Relationship status: Not on file   Other Topics Concern    Not on file   Social History Narrative    Not on file         Objective:     LABS:  WBC   Date Value Ref Range Status   03/16/2020 4.84 3.90 - 12.70 K/uL Final     Hemoglobin   Date Value Ref Range Status   03/16/2020 10.6 (L) 12.0 - 16.0 g/dL Final     Hematocrit   Date Value Ref Range Status   03/16/2020 34.2 (L) 37.0 - 48.5 % Final     Platelets   Date Value Ref Range Status   03/16/2020 183 150 - 350 K/uL Final       Chemistry        Component Value Date/Time     03/16/2020 0812    K 4.8 03/16/2020 0812     03/16/2020 0812    CO2 22 (L) 03/16/2020 0812    BUN 60 (H) 03/16/2020 0812    CREATININE 9.8 (H) 03/16/2020 0812     03/16/2020 0812        Component Value Date/Time    CALCIUM 9.6 03/16/2020 0812    ALKPHOS 124 03/13/2020 0927    AST 63 (H) 03/13/2020 0927    ALT 60 (H) 03/13/2020 0927    BILITOT 0.7 03/13/2020 0927            Assessment:       1. Metastatic renal cell carcinoma, unspecified laterality    2. Metastatic renal cell carcinoma, left    3. Metastatic renal cell carcinoma to intra-abdominal site    4. End stage renal failure on dialysis          Plan:       1.  Metastatic Renal Cell Carcinoma:    - It is unlikely that surgery removed all sites of metastatic disease. Given that this is c/w her previous papillary disease, pt started on oral cabozantinib 60mg  daily (for dual VEGF and MET inhibition), especially given recent data that shows cabo is far superior to student in the first line setting.  If pt progresses on this regimen, will switch to nivolumab.     Restaging scans show stable disease and pt tolerating cabozantinib very well. We will continue cabozantinib for now. Patient has minimal side effects from medication. We will plan for scans every 4 months.    RTC in 8 weeks with labs (CBC,CMP) and to see me.    The patient location is: home due to COVID-19 global pandemic  The chief complaint leading to consultation is: cancer   Visit type: Virtual visit with synchronous audio and video  Total time spent with patient: 25 mins  Each patient to whom he or she provides medical services by telemedicine is:  (1) informed of the relationship between the physician and patient and the respective role of any other health care provider with respect to management of the patient; and (2) notified that he or she may decline to receive medical services by telemedicine and may withdraw from such care at any time.        Patient was also seen and examined by Dr. Rivera. Patient is in agreement with the proposed treatment plan. All questions were answered to the patient's satisfaction. Pt knows to call clinic if anything is needed before the next clinic visit.     I, Dr. Ben Rivera, personally spent more than 25 mins during this encounter, greater than 50% was spent in direct counseling and/or coordination of care.     Ben Rivera M.D., M.S., F.A.C.P.  Hematology/Oncology Attending  [facility]

## 2020-06-01 ENCOUNTER — HOSPITAL ENCOUNTER (OUTPATIENT)
Dept: CARDIOLOGY | Facility: CLINIC | Age: 55
Discharge: HOME | End: 2020-06-01
Payer: MEDICARE

## 2020-06-01 ENCOUNTER — OFFICE VISIT (OUTPATIENT)
Dept: CARDIOLOGY | Facility: CLINIC | Age: 55
End: 2020-06-01
Payer: COMMERCIAL

## 2020-06-01 VITALS
HEIGHT: 64 IN | WEIGHT: 128.5 LBS | SYSTOLIC BLOOD PRESSURE: 176 MMHG | OXYGEN SATURATION: 96 % | DIASTOLIC BLOOD PRESSURE: 94 MMHG | BODY MASS INDEX: 21.94 KG/M2 | HEART RATE: 83 BPM

## 2020-06-01 DIAGNOSIS — Z90.5 S/P NEPHRECTOMY: ICD-10-CM

## 2020-06-01 DIAGNOSIS — I34.0 NONRHEUMATIC MITRAL VALVE REGURGITATION: ICD-10-CM

## 2020-06-01 DIAGNOSIS — I42.9 CARDIOMYOPATHY: ICD-10-CM

## 2020-06-01 DIAGNOSIS — E04.1 THYROID NODULE: ICD-10-CM

## 2020-06-01 DIAGNOSIS — I42.9 CARDIOMYOPATHY, UNSPECIFIED TYPE: ICD-10-CM

## 2020-06-01 DIAGNOSIS — D63.1 ANEMIA IN ESRD (END-STAGE RENAL DISEASE): ICD-10-CM

## 2020-06-01 DIAGNOSIS — I42.8 CARDIOMYOPATHY, NONISCHEMIC: Primary | ICD-10-CM

## 2020-06-01 DIAGNOSIS — I10 ESSENTIAL HYPERTENSION: ICD-10-CM

## 2020-06-01 DIAGNOSIS — N18.6 ANEMIA IN ESRD (END-STAGE RENAL DISEASE): ICD-10-CM

## 2020-06-01 DIAGNOSIS — Z99.2 ESRD (END STAGE RENAL DISEASE) ON DIALYSIS: ICD-10-CM

## 2020-06-01 DIAGNOSIS — R79.89 ABNORMAL TSH: ICD-10-CM

## 2020-06-01 DIAGNOSIS — N18.6 ESRD (END STAGE RENAL DISEASE) ON DIALYSIS: ICD-10-CM

## 2020-06-01 DIAGNOSIS — I27.20 PULMONARY HYPERTENSION: ICD-10-CM

## 2020-06-01 DIAGNOSIS — R73.03 PREDIABETES: ICD-10-CM

## 2020-06-01 PROBLEM — Z20.822 SUSPECTED COVID-19 VIRUS INFECTION: Status: RESOLVED | Noted: 2020-03-14 | Resolved: 2020-06-01

## 2020-06-01 PROCEDURE — 99214 PR OFFICE/OUTPT VISIT, EST, LEVL IV, 30-39 MIN: ICD-10-PCS | Mod: 25,S$GLB,, | Performed by: NURSE PRACTITIONER

## 2020-06-01 PROCEDURE — 99214 OFFICE O/P EST MOD 30 MIN: CPT | Mod: 25,S$GLB,, | Performed by: NURSE PRACTITIONER

## 2020-06-01 PROCEDURE — 99999 PR PBB SHADOW E&M-EST. PATIENT-LVL III: ICD-10-PCS | Mod: PBBFAC,,, | Performed by: NURSE PRACTITIONER

## 2020-06-01 PROCEDURE — 93005 ELECTROCARDIOGRAM TRACING: CPT | Mod: PBBFAC | Performed by: INTERNAL MEDICINE

## 2020-06-01 PROCEDURE — 93010 ELECTROCARDIOGRAM REPORT: CPT | Mod: S$PBB,,, | Performed by: INTERNAL MEDICINE

## 2020-06-01 PROCEDURE — 99999 PR PBB SHADOW E&M-EST. PATIENT-LVL III: CPT | Mod: PBBFAC,,, | Performed by: NURSE PRACTITIONER

## 2020-06-01 PROCEDURE — 93010 EKG 12-LEAD: ICD-10-PCS | Mod: S$PBB,,, | Performed by: INTERNAL MEDICINE

## 2020-06-01 PROCEDURE — 99213 OFFICE O/P EST LOW 20 MIN: CPT | Mod: PBBFAC,25 | Performed by: NURSE PRACTITIONER

## 2020-06-01 RX ORDER — METOPROLOL SUCCINATE 100 MG/1
100 TABLET, EXTENDED RELEASE ORAL DAILY
Qty: 90 TABLET | Refills: 3 | Status: SHIPPED | OUTPATIENT
Start: 2020-06-01 | End: 2022-07-05 | Stop reason: SDUPTHER

## 2020-06-01 RX ORDER — ISOSORBIDE DINITRATE 30 MG/1
30 TABLET ORAL 2 TIMES DAILY
Qty: 60 TABLET | Refills: 11 | Status: SHIPPED | OUTPATIENT
Start: 2020-06-01 | End: 2021-03-05

## 2020-06-01 NOTE — PROGRESS NOTES
Ms. Bloom is a patient of Dr. Witt and was last seen in [facility] Cardiology Visit 7/21/17      Subjective:   Patient ID:  Eva Bloom is a 54 y.o. female who presents for follow-up of No chief complaint on file.    Problems:  HTN  Glucose intolerance  ESRD on hemodialysis  Combined systolic and diastolic dysfunction, EF 38%  Pulmonary HTN, PA pressure 54  Moderate to severe MR  Moderate TR  Recurrent renal cell carcinoma, s/p nephrectomy  Small pericardial effusion 6/2017  Non-ischemic cardiomyopathy    HPI  Ms. Bloom is in clinic today for routine follow up.  Patient denies chest pain with exertion or at rest, palpitations, SOB, WARREN, dizziness, syncope, edema, orthopnea, PND, or claudication.   She is taking olmesartan 40 mg (ARB), metoprolol succinate 50 mg (BB), hydralazine 100 mg BID, and imdur 30 mg daily for her HF.  BP is not well controlled, running 160-170s, despite the previously mentioned antihypertensives and amlodipine 5 mg, clonidine patch 0.2 mg.    Review of Systems   Constitution: Negative for decreased appetite, diaphoresis, malaise/fatigue, weight gain and weight loss.   Eyes: Negative for visual disturbance.   Cardiovascular: Negative for chest pain, claudication, dyspnea on exertion, irregular heartbeat, leg swelling, near-syncope, orthopnea, palpitations, paroxysmal nocturnal dyspnea and syncope.        Denies chest pressure   Respiratory: Negative for cough, hemoptysis, shortness of breath, sleep disturbances due to breathing and snoring.    Endocrine: Negative for cold intolerance and heat intolerance.   Hematologic/Lymphatic: Negative for bleeding problem. Does not bruise/bleed easily.   Musculoskeletal: Negative for myalgias.   Gastrointestinal: Negative for bloating, abdominal pain, anorexia, change in bowel habit, constipation, diarrhea, nausea and vomiting.   Neurological: Negative for difficulty with concentration, disturbances in coordination, excessive daytime sleepiness,  dizziness, headaches, light-headedness, loss of balance, numbness and weakness.   Psychiatric/Behavioral: The patient does not have insomnia.        Allergies and current medications updated and reviewed:  Review of patient's allergies indicates:   Allergen Reactions    Coreg [carvedilol] Other (See Comments)     Nausea/vomiting    Allopurinol      Other reaction(s): abnormal transaminases     Current Outpatient Medications   Medication Sig    acetaminophen (TYLENOL) 500 MG tablet Take 500 mg by mouth every 6 (six) hours as needed for Pain.    albuterol (PROVENTIL/VENTOLIN HFA) 90 mcg/actuation inhaler Inhale 1-2 puffs into the lungs every 4 to 6 hours as needed for Wheezing or Shortness of Breath (chest tightness).    aluminum-magnesium hydroxide-simethicone (MAALOX) 200-200-20 mg/5 mL Susp Take 30 mLs by mouth every 6 (six) hours as needed.     amLODIPine (NORVASC) 5 MG tablet TAKE 1 TABLET BY MOUTH EVERY DAY    cabozantinib (CABOMETYX) 60 mg Tab Take 60 mg by mouth once daily.    cloNIDine 0.2 mg/24 hr td ptwk (CATAPRES) 0.2 mg/24 hr PLACE 1 PATCH ONTO THE SKIN EVERY 7 DAYS.    febuxostat (ULORIC) 40 mg Tab Take 1 tablet (40 mg total) by mouth once daily.    hydrALAZINE (APRESOLINE) 100 MG tablet TAKE 1 TABLET BY MOUTH EVERY 12 HOURS    inhalation spacing device Use as directed for inhalation.    isosorbide mononitrate (IMDUR) 30 MG 24 hr tablet Take 2 tablets (60 mg total) by mouth once daily.    lidocaine-prilocaine (EMLA) cream APPLY ATLEAST 30 MINUTES BEFORE TREATMENT 3 TIMES A WEEK    metoprolol succinate (TOPROL-XL) 50 MG 24 hr tablet TAKE 1 TABLET BY MOUTH EVERY DAY    multivitamin-Ca-iron-minerals (ONE-A-DAY WOMENS FORMULA) 27-0.4 mg Tab Take by mouth. 1 Tablet Oral Every day    olmesartan (BENICAR) 40 MG tablet TAKE 1 TABLET BY MOUTH EVERY DAY    ondansetron (ZOFRAN-ODT) 8 MG TbDL Take 1 tablet (8 mg total) by mouth every 8 (eight) hours as needed.    benzonatate (TESSALON) 200 MG  "capsule Take 1 capsule (200 mg total) by mouth 3 (three) times daily as needed for Cough. (Patient not taking: Reported on 1/8/2020)    psyllium husk-calcium (METAMUCIL PLUS CALCIUM) 1-60 gram-mg Cap Take by mouth. 2 Capsule Oral Every morning    sevelamer HCl (RENAGEL) 800 MG Tab Take 2 tablets (1,600 mg total) by mouth 3 (three) times daily with meals. (Patient not taking: Reported on 6/1/2020)     No current facility-administered medications for this visit.        Objective:          BP (!) 176/94 (BP Location: Left arm, Patient Position: Sitting, BP Method: Large (Automatic))   Pulse 83   Ht 5' 4" (1.626 m)   Wt 58.3 kg (128 lb 8.5 oz)   LMP 10/24/2016   SpO2 96%   BMI 22.06 kg/m²       Physical Exam   Constitutional: She is oriented to person, place, and time. Vital signs are normal. She appears well-developed and well-nourished. She is active. No distress.   HENT:   Head: Normocephalic and atraumatic.   Eyes: Conjunctivae and lids are normal. No scleral icterus.   Neck: Neck supple. JVD present. Normal carotid pulses and no hepatojugular reflux present. Carotid bruit is not present.   Cardiovascular: Normal rate, regular rhythm, S1 normal, S2 normal and intact distal pulses. PMI is not displaced. Exam reveals no gallop and no friction rub.   Murmur heard.  High-pitched blowing holosystolic murmur is present with a grade of 2/6 at the apex radiating to the axilla. AV fistula  Pulses:       Carotid pulses are 2+ on the right side, and 2+ on the left side.       Radial pulses are 2+ on the right side, and 2+ on the left side.        Dorsalis pedis pulses are 1+ on the right side, and 1+ on the left side.        Posterior tibial pulses are 1+ on the right side, and 1+ on the left side.   Pulmonary/Chest: Effort normal and breath sounds normal. No respiratory distress. She has no decreased breath sounds. She has no wheezes. She has no rhonchi. She has no rales. She exhibits no tenderness.   Abdominal: Soft. " Normal appearance and bowel sounds are normal. She exhibits no distension, no fluid wave, no abdominal bruit, no ascites and no pulsatile midline mass. There is no hepatosplenomegaly. There is no tenderness.   Musculoskeletal: She exhibits edema (BLE +1).   Neurological: She is alert and oriented to person, place, and time. Gait normal.   Skin: Skin is warm, dry and intact. No rash noted. She is not diaphoretic. Nails show no clubbing.   Psychiatric: She has a normal mood and affect. Her speech is normal and behavior is normal. Judgment and thought content normal. Cognition and memory are normal.   Nursing note and vitals reviewed.      Chemistry        Component Value Date/Time     03/16/2020 0812    K 4.8 03/16/2020 0812     03/16/2020 0812    CO2 22 (L) 03/16/2020 0812    BUN 60 (H) 03/16/2020 0812    CREATININE 9.8 (H) 03/16/2020 0812     03/16/2020 0812        Component Value Date/Time    CALCIUM 9.6 03/16/2020 0812    ALKPHOS 124 03/13/2020 0927    AST 63 (H) 03/13/2020 0927    ALT 60 (H) 03/13/2020 0927    BILITOT 0.7 03/13/2020 0927    ESTGFRAFRICA 4.6 (A) 03/16/2020 0812    EGFRNONAA 4.0 (A) 03/16/2020 0812        Lab Results   Component Value Date    HGBA1C 4.4 01/08/2020     Recent Labs   Lab 11/09/18  0719  03/14/20  0437  03/16/20  0812   WBC 5.04   < > 4.96   < > 4.84   Hemoglobin 10.7 L   < > 10.9 L   < > 10.6 L   Hematocrit 34.0 L   < > 35.4 L   < > 34.2 L   Mean Corpuscular Volume 112 H   < > 106 H   < > 106 H   Platelets 159   < > 168   < > 183   BNP  --   --  >4,900 H  --   --    TSH 6.693 H  --   --   --   --     < > = values in this interval not displayed.       Recent Labs   Lab 03/14/20  0437   INR 1.1        Test(s) Reviewed  I have reviewed the following in detail:  [] Stress test   [] Angiography   [x] Echocardiogram   [x] Labs   [] Other:         Assessment/Plan:   1. Cardiomyopathy, nonischemic  Increase metoprolol to 100 mg daily.  Change imdur to isordil 30 mg BID to  match components of bydil.  Continue ARB and loop diuretic at current doses.  Plan to up titrate metoprolol succinate and repeat TTE in 3 months.     - isosorbide dinitrate (ISORDIL) 30 MG Tab; Take 1 tablet (30 mg total) by mouth 2 (two) times daily.  Dispense: 60 tablet; Refill: 11  - Echo Color Flow Doppler? Yes; Future    2. Pulmonary hypertension  Pulmonary HTN worse than previous.  Will control BP and repeat TTE.     - Echo Color Flow Doppler? Yes; Future    3. Essential hypertension  BP not at goal <130/80. Increase metoprolol to 100 mg daily.  Increase amlodipine to 10 mg.  Change imdur 30 mg to isordil 30 mg BID.  Can not change metoprolol to carvedilol d/t nausea/vomiting for a week when tried previously.  Requested 2 week BP log.     - metoprolol succinate (TOPROL-XL) 100 MG 24 hr tablet; Take 1 tablet (100 mg total) by mouth once daily.  Dispense: 90 tablet; Refill: 3  - isosorbide dinitrate (ISORDIL) 30 MG Tab; Take 1 tablet (30 mg total) by mouth 2 (two) times daily.  Dispense: 60 tablet; Refill: 11    4. ESRD (end stage renal disease) on dialysis      5. Prediabetes  Resolved. Last A1C 4.4    6. S/p nephrectomy left      7. Anemia in ESRD (end-stage renal disease)      8. Thyroid nodule  Last TSH elevated in 2018. No recent TSH.  Add to already scheduled labs.     - TSH; Future    9. Abnormal TSH    - TSH; Future    10. Mitral Valve Regurgitation  2/6 systolic blowing murmur heard best closer to the left axilla.  Reviewed TTE films with Dr. Witt.  She has enlarged atria and ventricles causing functional TR and MR.  Her uncontrolled HTN is contributing to her MR and TR.  Re-evaluate after HTN is better controlled.       Patient was discussed with but not examined by Dr. Witt    Follow up in about 3 months (around 9/1/2020).

## 2020-06-01 NOTE — PATIENT INSTRUCTIONS
"Increase the metoprolol to 100 mg by mouth daily.  You can take 2 of the 50 mg tablets a day until you run out and then start the new prescription.     Increase the amlodipine to 10 mg by mouth daily in the morning.  You can take 2 of the 5 mg tablets by mouth daily until you run out and then start the new prescription.     Stop the isosorbide mononitrate (imdur) 30 mg and start the isosorbide dinitrate 30 mg by mouth twice a day.     Move the olmesartan 40 mg to the evening starting tomorrow.    Please check your blood pressure daily and email or call me in 2 weeks with your blood pressure readings. [phone]       Living with an Implantable Cardioverter Defibrillator (ICD)  Your ICD is a device that monitors the electrical signals in your heart. Most ICDs are well protected from other electrical device interference. Microwave ovens and most common household and yard appliances will not cause problems. Signals from some large electric or magnetic fields can make interference "noise" on your ICD. This can cause problems. Possible sources of interference include certain heavy equipment, strong magnets, running motors, and large tools like commercial arc welders. You shouldn't work on your car with the motor running, but it's safe to drive. Check with your doctor about any large, unusual power tools you use.        Cell phones dont usually cause problems, but don't carry your phone in your shirt pocket right over the ICD.   Signals that cause problems  To protect your ICD, take special precautions around:  · Cell phones. Always carry a cell phone on the side opposite your ICD and at least 6 inches away from it. While using a cell phone, wear a headset or hold the phone to the ear opposite your ICD.  · Electromagnetic anti-theft systems. These are often near entrances or exits in stores. Walking through one is OK, but avoid standing near or leaning against one.  · Strong electrical fields. These can be caused by " radio transmitting towers and heavy-duty electrical equipment (such as arc welders). A running engine also produces an electrical field. Its OK to ride in a car, but avoid leaning over the open morocho of a running car.  · Very strong magnets. Talk with your heart doctor if another doctor tells you to have an MRI (a medical test that uses magnets). Some ICD devices are considered safe for having an MRI (called MR-conditional devices), but safety precautions must still be used. Magnets in big speakers (such as on a stereo or at a concert) and in hand-held security wands (such as those used at airports) can cause problems if they come too close to the ICD.  If a signal interferes  If its near one of the signals described above, the ICD could turn off or its settings could reset. You could even get a shock. If you think you were exposed to a signal like this, call your doctor and explain what happened.  Carry an ID card  Youll be given a temporary ID card when you get your ICD. The permanent card will be mailed to you in about 6 weeks. Show this card to any doctor, dentist, or other medical professional you visit. Also show it to guards at the airport. This way, they know to follow special procedures that prevent the security wand from interfering with your ICD.  Driving safety with an ICD  ICD devices are implanted to shock the heart out of a life threatening heart rhythm. These heart rhythms can cause loss of consciousness (syncope). Your healthcare provider will give you directions on if and when it's safe to drive after you have had once of these devices implanted. Generally, you should not drive for 6 months after you have had this device implanted, or after the device has delivered a shock. You should never drive for commercial purposes after you have an ICD implanted. This is often restricted by state laws because of the high risk of passing out and the dangers that poses while driving.  Follow up  Plan on  having periodic checkups with your healthcare provider to evaluate the battery life of your ICD. Depending on your device and how much your body uses the pacing functions of the ICD, you will need a new device generator implanted at some point, usually about every 5 to 7 years. On average, this monitoring should happen every 6 months, or as advised by your healthcare provider.  For some devices, the monitoring of the device function and battery life can be done with a remote monitor that can be set up in your home. Remote monitoring systems use the internet or telephone to communicate the information from your device to your healthcare provider.  Date Last Reviewed: 5/1/2016  © 3607-8463 [employer]. [address] [place] [address], [place] [address]. All rights reserved. This information is not intended as a substitute for professional medical care. Always follow your healthcare professional's instructions.

## 2020-06-05 ENCOUNTER — HOSPITAL ENCOUNTER (OUTPATIENT)
Dept: VASCULAR SURGERY | Facility: CLINIC | Age: 55
Discharge: HOME | End: 2020-06-05
Attending: SURGERY
Payer: COMMERCIAL

## 2020-06-05 ENCOUNTER — OFFICE VISIT (OUTPATIENT)
Dept: VASCULAR SURGERY | Facility: CLINIC | Age: 55
End: 2020-06-05
Attending: SURGERY
Payer: MEDICARE

## 2020-06-05 VITALS
SYSTOLIC BLOOD PRESSURE: 162 MMHG | TEMPERATURE: 98 F | HEIGHT: 64 IN | WEIGHT: 126.56 LBS | HEART RATE: 71 BPM | BODY MASS INDEX: 21.61 KG/M2 | DIASTOLIC BLOOD PRESSURE: 83 MMHG

## 2020-06-05 DIAGNOSIS — Z99.2 ESRD (END STAGE RENAL DISEASE) ON DIALYSIS: Primary | ICD-10-CM

## 2020-06-05 DIAGNOSIS — Z99.2 ESRD (END STAGE RENAL DISEASE) ON DIALYSIS: ICD-10-CM

## 2020-06-05 DIAGNOSIS — N18.6 ESRD (END STAGE RENAL DISEASE) ON DIALYSIS: ICD-10-CM

## 2020-06-05 DIAGNOSIS — N18.6 ESRD (END STAGE RENAL DISEASE) ON DIALYSIS: Primary | ICD-10-CM

## 2020-06-05 PROCEDURE — 99213 OFFICE O/P EST LOW 20 MIN: CPT | Mod: PBBFAC,25 | Performed by: SURGERY

## 2020-06-05 PROCEDURE — 93990 DOPPLER FLOW TESTING: CPT | Mod: S$GLB,,, | Performed by: SURGERY

## 2020-06-05 PROCEDURE — 99999 PR PBB SHADOW E&M-EST. PATIENT-LVL III: ICD-10-PCS | Mod: PBBFAC,,, | Performed by: SURGERY

## 2020-06-05 PROCEDURE — 93990 PR DUPLEX HEMODIALYSIS ACCESS: ICD-10-PCS | Mod: S$GLB,,, | Performed by: SURGERY

## 2020-06-05 PROCEDURE — 99213 OFFICE O/P EST LOW 20 MIN: CPT | Mod: S$GLB,,, | Performed by: SURGERY

## 2020-06-05 PROCEDURE — 99999 PR PBB SHADOW E&M-EST. PATIENT-LVL III: CPT | Mod: PBBFAC,,, | Performed by: SURGERY

## 2020-06-05 PROCEDURE — 99213 PR OFFICE/OUTPT VISIT, EST, LEVL III, 20-29 MIN: ICD-10-PCS | Mod: S$GLB,,, | Performed by: SURGERY

## 2020-06-05 PROCEDURE — 93990 DOPPLER FLOW TESTING: CPT | Mod: PBBFAC | Performed by: SURGERY

## 2020-06-05 NOTE — PROGRESS NOTES
See Dr. Muñoz's prior notes, review of systems, family history and social history are   unchanged.    HISTORY OF PRESENT ILLNESS:  A 54 y.o. female with end-stage renal disease,   well known to Dr. Muñoz, status post,  1.  Covered stent placement, left AV fistula outflow tract, 03/06/2017.  2.  Left upper arm 4-7 tapered AV graft on 01/12/2017.    Of note, at the time of surgery was found to have an exceptionally thin and   friable 4 mm brachial vein, which she used outflow and which she returned with   the stenosis that was treated empirically with a Covered stent because of the   risk of rupture.    10/29/19:  She presents because of question of modest erythema around her graft.  She has not had any trouble with the graft per se    6/5/20: Returns for surveillance evaluation. Doing well. No trouble at HD. Redness about graft resolved.     PHYSICAL EXAMINATION:  VITAL SIGNS:  See nursing note.  EXTREMITIES:  Left arm shows a good thrill with minimal pulsatility in the graft.  2+ radial pulse. There is no fluctuance    IMAGING:  Duplex of the graft shows it to be patent no significant stenosis. Stent patent with no stenosis.  Flow volume is 2.0 L.  no perigraft fluid is seen    ASSESSMENT:  Well-functioning left upper arm AV graft with Viabahn stent graft outflow treatment    PLAN:    6 month follow up for graft surveillance.  Sooner should issues arise.     Easton Bird MD PGY6  Vascular and Endovascular Surgery Fellow  06/05/2020    VASCULAR STAFF    I have personally taken the history and examined this patient and agree with the resident's note as stated above      MANPREET Muñoz III, MD, FACS  Professor and Chief, Vascular and Endovascular Surgery

## 2020-06-15 ENCOUNTER — TELEPHONE (OUTPATIENT)
Dept: HEMATOLOGY/ONCOLOGY | Facility: CLINIC | Age: 55
End: 2020-06-15

## 2020-06-15 NOTE — TELEPHONE ENCOUNTER
----- Message from Laura Abdi sent at 6/15/2020 10:48 AM CDT -----  Regarding: Disability Paperwork  Contact: PT  PT would like Liset to call her regarding some disability paperwork she needs to drop off to get filled out. Please call back       Callback:[phone]

## 2020-06-18 ENCOUNTER — TELEPHONE (OUTPATIENT)
Dept: HEMATOLOGY/ONCOLOGY | Facility: CLINIC | Age: 55
End: 2020-06-18

## 2020-06-18 PROBLEM — R06.02 SOB (SHORTNESS OF BREATH): Status: RESOLVED | Noted: 2020-03-14 | Resolved: 2020-06-18

## 2020-06-18 PROBLEM — I16.1 HYPERTENSIVE EMERGENCY: Status: RESOLVED | Noted: 2020-03-14 | Resolved: 2020-06-18

## 2020-06-18 PROBLEM — B34.8 RHINOVIRUS INFECTION: Status: RESOLVED | Noted: 2020-03-15 | Resolved: 2020-06-18

## 2020-06-18 NOTE — TELEPHONE ENCOUNTER
----- Message from Phoebe Carrasco sent at 6/18/2020  9:13 AM CDT -----  Contact: Pt  Pt called to speak with Liset regarding disability paperwork. Pt asked for a call back.      Pt contact # [phone].      Thanks

## 2020-06-19 ENCOUNTER — OFFICE VISIT (OUTPATIENT)
Dept: FAMILY MEDICINE | Facility: CLINIC | Age: 55
End: 2020-06-19
Payer: MEDICARE

## 2020-06-19 VITALS
SYSTOLIC BLOOD PRESSURE: 150 MMHG | DIASTOLIC BLOOD PRESSURE: 70 MMHG | BODY MASS INDEX: 21.43 KG/M2 | HEIGHT: 64 IN | HEART RATE: 83 BPM | WEIGHT: 125.56 LBS | TEMPERATURE: 98 F | OXYGEN SATURATION: 96 %

## 2020-06-19 DIAGNOSIS — Z00.00 ROUTINE MEDICAL EXAM: Primary | ICD-10-CM

## 2020-06-19 DIAGNOSIS — E04.2 MULTINODULAR GOITER: ICD-10-CM

## 2020-06-19 DIAGNOSIS — C64.9 METASTATIC RENAL CELL CARCINOMA TO INTRA-ABDOMINAL SITE: ICD-10-CM

## 2020-06-19 DIAGNOSIS — Z23 NEED FOR SHINGLES VACCINE: ICD-10-CM

## 2020-06-19 DIAGNOSIS — C79.89 METASTATIC RENAL CELL CARCINOMA TO INTRA-ABDOMINAL SITE: ICD-10-CM

## 2020-06-19 DIAGNOSIS — I10 ESSENTIAL HYPERTENSION: ICD-10-CM

## 2020-06-19 DIAGNOSIS — I51.89 COMBINED SYSTOLIC AND DIASTOLIC CARDIAC DYSFUNCTION: ICD-10-CM

## 2020-06-19 DIAGNOSIS — I27.20 PULMONARY HYPERTENSION: ICD-10-CM

## 2020-06-19 DIAGNOSIS — N25.81 HYPERPARATHYROIDISM, SECONDARY RENAL: ICD-10-CM

## 2020-06-19 DIAGNOSIS — Z99.2 ESRD (END STAGE RENAL DISEASE) ON DIALYSIS: ICD-10-CM

## 2020-06-19 DIAGNOSIS — N18.6 ESRD (END STAGE RENAL DISEASE) ON DIALYSIS: ICD-10-CM

## 2020-06-19 DIAGNOSIS — I42.8 CARDIOMYOPATHY, NONISCHEMIC: ICD-10-CM

## 2020-06-19 PROCEDURE — 99215 OFFICE O/P EST HI 40 MIN: CPT | Mod: PBBFAC,PO | Performed by: INTERNAL MEDICINE

## 2020-06-19 PROCEDURE — 99999 PR PBB SHADOW E&M-EST. PATIENT-LVL V: CPT | Mod: PBBFAC,,, | Performed by: INTERNAL MEDICINE

## 2020-06-19 PROCEDURE — 99396 PR PREVENTIVE VISIT,EST,40-64: ICD-10-PCS | Mod: S$PBB,GZ,, | Performed by: INTERNAL MEDICINE

## 2020-06-19 PROCEDURE — 99999 PR PBB SHADOW E&M-EST. PATIENT-LVL V: ICD-10-PCS | Mod: PBBFAC,,, | Performed by: INTERNAL MEDICINE

## 2020-06-19 PROCEDURE — 99396 PREV VISIT EST AGE 40-64: CPT | Mod: S$PBB,GZ,, | Performed by: INTERNAL MEDICINE

## 2020-06-19 NOTE — PROGRESS NOTES
HISTORY OF PRESENT ILLNESS:  Eva Bloom is a 54 y.o. female who presents to the clinic today for a routine physical exam. Her last physical exam was approximately 1 years(s) ago.        PAST MEDICAL HISTORY:  Past Medical History:   Diagnosis Date    Anemia     Bronchitis 03/01/2017    Cancer 2016    kidney cancer    CMV (cytomegalovirus) antibody positive     Essential hypertension 9/23/2015    H/O herpes simplex type 2 infection     Herpes simplex type 1 antibody positive     History of kidney cancer     s/p left nephrectomy 1/2016    Hyperparathyroidism, unspecified     Hyperuricemia without signs of inflammatory arthritis and tophaceous disease     Kidney stones     LGSIL (low grade squamous intraepithelial dysplasia)     Myocardiopathy 7/21/2017    Prediabetes     Proteinuria     Renal disorder     Thyroid nodule     Urate nephropathy        PAST SURGICAL HISTORY:  Past Surgical History:   Procedure Laterality Date    BREAST CYST EXCISION      COLONOSCOPY N/A 11/12/2015    NEPHRECTOMY-LAPAROSCOPIC Left 01/12/2016    PERITONEAL CATHETER INSERTION      Permacath insertion  01/12/2017    SALPINGOOPHORECTOMY Right 2016    [facility]---DAVINCI    TONSILLECTOMY      TUBAL LIGATION         SOCIAL HISTORY:  Social History     Socioeconomic History    Marital status:      Spouse name: Not on file    Number of children: 2    Years of education: Not on file    Highest education level: Not on file   Occupational History    Occupation: [employer]     Employer: [id number]   Social Needs    Financial resource strain: Not on file    Food insecurity     Worry: Not on file     Inability: Not on file    Transportation needs     Medical: Not on file     Non-medical: Not on file   Tobacco Use    Smoking status: Never Smoker    Smokeless tobacco: Never Used   Substance and Sexual Activity    Alcohol use: No     Comment: . 2 children. works at [employer].    Drug use: No     Sexual activity: Yes     Partners: Male   Lifestyle    Physical activity     Days per week: Not on file     Minutes per session: Not on file    Stress: Not on file   Relationships    Social connections     Talks on phone: Not on file     Gets together: Not on file     Attends Taoism service: Not on file     Active member of club or organization: Not on file     Attends meetings of clubs or organizations: Not on file     Relationship status: Not on file   Other Topics Concern    Not on file   Social History Narrative    Not on file       FAMILY HISTORY:  Family History   Problem Relation Age of Onset    Hypertension Mother     Diabetes Father     Kidney disease Father     No Known Problems Son     No Known Problems Son     Diabetes Maternal Grandfather     No Known Problems Sister     No Known Problems Brother     No Known Problems Maternal Grandmother     No Known Problems Paternal Grandmother     No Known Problems Paternal Grandfather     Heart disease Sister     No Known Problems Sister     No Known Problems Brother     No Known Problems Maternal Aunt     No Known Problems Maternal Uncle     No Known Problems Paternal Aunt     No Known Problems Paternal Uncle     Breast cancer Neg Hx     Colon cancer Neg Hx     Cancer Neg Hx     Stroke Neg Hx     Amblyopia Neg Hx     Blindness Neg Hx     Cataracts Neg Hx     Glaucoma Neg Hx     Macular degeneration Neg Hx     Retinal detachment Neg Hx     Strabismus Neg Hx     Thyroid disease Neg Hx        ALLERGIES AND MEDICATIONS: updated and reviewed.  Review of patient's allergies indicates:   Allergen Reactions    Coreg [carvedilol] Other (See Comments)     Nausea/vomiting    Allopurinol      Other reaction(s): abnormal transaminases     Medication List with Changes/Refills   Current Medications    ACETAMINOPHEN (TYLENOL) 500 MG TABLET    Take 500 mg by mouth every 6 (six) hours as needed for Pain.    ALBUTEROL (PROVENTIL/VENTOLIN HFA)  90 MCG/ACTUATION INHALER    Inhale 1-2 puffs into the lungs every 4 to 6 hours as needed for Wheezing or Shortness of Breath (chest tightness).    ALUMINUM-MAGNESIUM HYDROXIDE-SIMETHICONE (MAALOX) 200-200-20 MG/5 ML SUSP    Take 30 mLs by mouth every 6 (six) hours as needed.     AMLODIPINE (NORVASC) 5 MG TABLET    TAKE 1 TABLET BY MOUTH EVERY DAY    BENZONATATE (TESSALON) 200 MG CAPSULE    Take 1 capsule (200 mg total) by mouth 3 (three) times daily as needed for Cough.    CABOZANTINIB (CABOMETYX) 60 MG TAB    Take 60 mg by mouth once daily.    CLONIDINE 0.2 MG/24 HR TD PTWK (CATAPRES) 0.2 MG/24 HR    PLACE 1 PATCH ONTO THE SKIN EVERY 7 DAYS.    FEBUXOSTAT (ULORIC) 40 MG TAB    Take 1 tablet (40 mg total) by mouth once daily.    HYDRALAZINE (APRESOLINE) 100 MG TABLET    TAKE 1 TABLET BY MOUTH EVERY 12 HOURS    INHALATION SPACING DEVICE    Use as directed for inhalation.    ISOSORBIDE DINITRATE (ISORDIL) 30 MG TAB    Take 1 tablet (30 mg total) by mouth 2 (two) times daily.    LIDOCAINE-PRILOCAINE (EMLA) CREAM    APPLY ATLEAST 30 MINUTES BEFORE TREATMENT 3 TIMES A WEEK    METOPROLOL SUCCINATE (TOPROL-XL) 100 MG 24 HR TABLET    Take 1 tablet (100 mg total) by mouth once daily.    MULTIVITAMIN-CA-IRON-MINERALS (ONE-A-DAY WOMENS FORMULA) 27-0.4 MG TAB    Take by mouth. 1 Tablet Oral Every day    OLMESARTAN (BENICAR) 40 MG TABLET    TAKE 1 TABLET BY MOUTH EVERY DAY    ONDANSETRON (ZOFRAN-ODT) 8 MG TBDL    Take 1 tablet (8 mg total) by mouth every 8 (eight) hours as needed.          CARE TEAM:  Patient Care Team:  Sowmya Mccain MD as PCP - General (Internal Medicine)  Ben Rivera MD as Consulting Physician (Hematology and Oncology)  Leora Louis MD (Cardiology)  Williams Hart MD as Consulting Physician (Nephrology)  Lulu Le LPN as Licensed Practical Nurse           SCREENING HISTORY:  Health Maintenance       Date Due Completion Date    Shingles Vaccine (1 of 2) 10/01/2014 ---    Mammogram  "10/23/2020 10/23/2019    Colorectal Cancer Screening 11/12/2020 11/12/2015    Hemoglobin A1c 01/08/2021 1/8/2020    Lipid Panel 09/08/2021 9/8/2016    Pneumococcal Vaccine (Highest Risk) (3 of 3 - PPSV23) 11/09/2021 11/9/2016    Cervical Cancer Screening 11/16/2021 11/16/2018    TETANUS VACCINE 09/23/2025 9/23/2015            REVIEW OF SYSTEMS:   The patient reports: good dietary habits.  The patient reports : that they do not exercise, but stay active.  Review of Systems   Constitutional: Negative for chills, fatigue, fever and unexpected weight change.   HENT: Negative for congestion and postnasal drip.    Eyes: Negative for pain and visual disturbance.   Respiratory: Negative for cough, shortness of breath and wheezing.    Cardiovascular: Positive for leg swelling (- has been having some swelling in her feet). Negative for chest pain and palpitations.        She is seeing cardiology to try and get her blood pressure under control.   Gastrointestinal: Negative for abdominal pain, constipation, diarrhea, nausea and vomiting.   Genitourinary: Negative for dysuria.   Musculoskeletal: Negative for arthralgias and back pain.   Skin: Negative for rash.   Neurological: Negative for weakness and headaches.   Psychiatric/Behavioral: Negative for dysphoric mood and sleep disturbance. The patient is not nervous/anxious.       ROS (Optional)-: no pelvic pain  Breast ROS (Optional)-: negative for breast lumps/discharge            Physical Examination:   Vitals:    06/19/20 0843   BP: (!) 150/70   Pulse: 83   Temp: 97.5 °F (36.4 °C)     Weight: 56.9 kg (125 lb 8.8 oz)   Height: 5' 4" (162.6 cm)   Body mass index is 21.55 kg/m².      Patient did not require to have a chaperone present during the exam today.    General appearance - alert, well appearing, and in no distress, normal appearing weight  Psychiatric - alert, oriented to person, place, and time, normal mood, behavior, speech, dress, motor activity, and thought " processes  Eyes - pupils equal and reactive, extraocular eye movements intact, sclera anicteric  Mouth - not examined; patient wearing mask due to Covid 19 pandemic  Neck - supple, no significant adenopathy, carotids upstroke normal bilaterally, no bruits, thyroid exam: thyroid is normal in size without nodules or tenderness  Lymphatics - no palpable cervical lymphadenopathy  Chest - clear to auscultation, no wheezes, rales or rhonchi, symmetric air entry  Heart - normal rate and regular rhythm, no gallops noted, systolic murmur 4/6   Abdomen - soft, nontender, nondistended, no masses or organomegaly  Breasts - not examined  Back exam - full range of motion, no tenderness, palpable spasm or pain on motion, in depth exam deferred  Neurological - alert, normal speech, no focal findings or movement disorder noted, cranial nerves II through XII intact  Musculoskeletal - patient noted to have Mild osteoarthritic changes to both knee joints. No joint effusions noted., no muscular tenderness noted  Extremities - pedal edema trace-1+ (mostly in her feet), she had a AV graft in place in her left upper extremity with a strong thrill  Skin - normal coloration and turgor, no rashes, no suspicious skin lesions noted      Labs:  No labs needed at this time      ASSESSMENT AND PLAN:  1. Routine medical exam  Counseled on age appropriate medical preventative services including age appropriate cancer screenings, age appropriate eye and dental exams, over all nutritional health, need for a consistent exercise regimen, and an over all push towards maintaining a vigorous and active lifestyle.  Counseled on age appropriate vaccines and discussed upcoming health care needs based on age/gender. Discussed good sleep hygiene and stress management.    2. Essential hypertension  Blood pressure is not controlled today. We discussed low salt diet and regular exercise. Patient reports that they have not taken any decongestant or  anti-inflammatory medication recently (patient educated that these medications can increase blood pressure).  Medication changes made today: None.  Patient is seeing Cardiology to get her blood pressure under control.  Medication changes were made about 2 weeks ago.  She will send new readings to Cardiology for follow-up.. Patient is not eligible to enroll at this time in the digital hypertension program at this time.   - MyChart Patient Entered Blood Pressure    3. Cardiomyopathy, nonischemic/4. Combined systolic and diastolic cardiac dysfunction/5. Pulmonary hypertension  The current medical regimen is effective;  continue present plan and medications.   Followed by: Cardiology.     6. ESRD (end stage renal disease) on dialysis  Continue dialysis Tuesday, Thursday, Saturday.  She is followed by the Nephrology.    7. Metastatic renal cell carcinoma to intra-abdominal site  Followed by oncology.    8. Hyperparathyroidism, secondary renal  Stable. Asymptomatic. Observe.    9. Multinodular goiter  Stable. Asymptomatic. Observe.    10. Need for shingles vaccine  Patient was advised to get immunization at the pharmacy.    Advance Care Planning   I initiated the process and explained the importance of advance care planning today with the patient.  Advanced directives were discussed due to patient's age and/or chronic medical conditions. Prognosis based on current condition: fair.   Paperwork was given to complete living will (at this point in time, the patient does have full decision-making capacity.  We discussed different extreme health states that she could experience, and reviewed what kind of medical care she would want in those situations) and medical POA (The patient received detailed information about the importance of designating a Health Care Power of . She was also instructed to communicate with this person about their wishes for future healthcare, should she become sick and lose decision-making  capacity).   [place] was not discussed.   Approximately 3 minute(s) were spent on counseling/discussion regarding end of life decision making.             Follow up in about 6 months (around 12/19/2020), or if symptoms worsen or fail to improve, for follow up chronic medical conditions.. or sooner as needed.

## 2020-06-22 ENCOUNTER — TELEPHONE (OUTPATIENT)
Dept: HEMATOLOGY/ONCOLOGY | Facility: CLINIC | Age: 55
End: 2020-06-22

## 2020-06-22 NOTE — TELEPHONE ENCOUNTER
----- Message from Magnolia Banuelos sent at 6/22/2020  4:01 PM CDT -----  Regarding: Disability Forms  Contact: PT  PT called in requesting her paperwork back that she left in clinic on last week - regarding disability forms.     Callback: [phone]

## 2020-07-10 ENCOUNTER — LAB VISIT (OUTPATIENT)
Dept: LAB | Facility: HOSPITAL | Age: 55
End: 2020-07-10
Attending: INTERNAL MEDICINE
Payer: COMMERCIAL

## 2020-07-10 DIAGNOSIS — C64.2 MALIGNANT NEOPLASM OF LEFT KIDNEY: ICD-10-CM

## 2020-07-10 DIAGNOSIS — E04.1 THYROID NODULE: ICD-10-CM

## 2020-07-10 DIAGNOSIS — R79.89 ABNORMAL TSH: ICD-10-CM

## 2020-07-10 LAB
ALBUMIN SERPL BCP-MCNC: 3.1 G/DL (ref 3.5–5.2)
ALP SERPL-CCNC: 180 U/L (ref 55–135)
ALT SERPL W/O P-5'-P-CCNC: 38 U/L (ref 10–44)
ANION GAP SERPL CALC-SCNC: 10 MMOL/L (ref 8–16)
AST SERPL-CCNC: 49 U/L (ref 10–40)
BASOPHILS # BLD AUTO: 0.04 K/UL (ref 0–0.2)
BASOPHILS NFR BLD: 0.9 % (ref 0–1.9)
BILIRUB SERPL-MCNC: 0.7 MG/DL (ref 0.1–1)
BUN SERPL-MCNC: 53 MG/DL (ref 6–20)
CALCIUM SERPL-MCNC: 9.6 MG/DL (ref 8.7–10.5)
CHLORIDE SERPL-SCNC: 104 MMOL/L (ref 95–110)
CO2 SERPL-SCNC: 27 MMOL/L (ref 23–29)
CREAT SERPL-MCNC: 7.6 MG/DL (ref 0.5–1.4)
DIFFERENTIAL METHOD: ABNORMAL
EOSINOPHIL # BLD AUTO: 0.2 K/UL (ref 0–0.5)
EOSINOPHIL NFR BLD: 4 % (ref 0–8)
ERYTHROCYTE [DISTWIDTH] IN BLOOD BY AUTOMATED COUNT: 15.8 % (ref 11.5–14.5)
EST. GFR  (AFRICAN AMERICAN): 6 ML/MIN/1.73 M^2
EST. GFR  (NON AFRICAN AMERICAN): 5 ML/MIN/1.73 M^2
GLUCOSE SERPL-MCNC: 126 MG/DL (ref 70–110)
HCT VFR BLD AUTO: 37.1 % (ref 37–48.5)
HGB BLD-MCNC: 11.4 G/DL (ref 12–16)
IMM GRANULOCYTES # BLD AUTO: 0.01 K/UL (ref 0–0.04)
IMM GRANULOCYTES NFR BLD AUTO: 0.2 % (ref 0–0.5)
LYMPHOCYTES # BLD AUTO: 1.3 K/UL (ref 1–4.8)
LYMPHOCYTES NFR BLD: 28.5 % (ref 18–48)
MCH RBC QN AUTO: 33.4 PG (ref 27–31)
MCHC RBC AUTO-ENTMCNC: 30.7 G/DL (ref 32–36)
MCV RBC AUTO: 109 FL (ref 82–98)
MONOCYTES # BLD AUTO: 0.4 K/UL (ref 0.3–1)
MONOCYTES NFR BLD: 9.7 % (ref 4–15)
NEUTROPHILS # BLD AUTO: 2.6 K/UL (ref 1.8–7.7)
NEUTROPHILS NFR BLD: 56.7 % (ref 38–73)
NRBC BLD-RTO: 0 /100 WBC
PLATELET # BLD AUTO: 150 K/UL (ref 150–350)
PMV BLD AUTO: 11.1 FL (ref 9.2–12.9)
POTASSIUM SERPL-SCNC: 4.3 MMOL/L (ref 3.5–5.1)
PROT SERPL-MCNC: 6.3 G/DL (ref 6–8.4)
RBC # BLD AUTO: 3.41 M/UL (ref 4–5.4)
SODIUM SERPL-SCNC: 141 MMOL/L (ref 136–145)
TSH SERPL DL<=0.005 MIU/L-ACNC: 3.72 UIU/ML (ref 0.4–4)
WBC # BLD AUTO: 4.53 K/UL (ref 3.9–12.7)

## 2020-07-10 PROCEDURE — 36415 COLL VENOUS BLD VENIPUNCTURE: CPT | Mod: PO

## 2020-07-10 PROCEDURE — 80053 COMPREHEN METABOLIC PANEL: CPT

## 2020-07-10 PROCEDURE — 85025 COMPLETE CBC W/AUTO DIFF WBC: CPT

## 2020-07-10 PROCEDURE — 84443 ASSAY THYROID STIM HORMONE: CPT

## 2020-07-13 ENCOUNTER — OFFICE VISIT (OUTPATIENT)
Dept: HEMATOLOGY/ONCOLOGY | Facility: CLINIC | Age: 55
End: 2020-07-13
Payer: COMMERCIAL

## 2020-07-13 VITALS
HEART RATE: 70 BPM | HEIGHT: 64 IN | DIASTOLIC BLOOD PRESSURE: 82 MMHG | RESPIRATION RATE: 18 BRPM | SYSTOLIC BLOOD PRESSURE: 165 MMHG | WEIGHT: 130.94 LBS | OXYGEN SATURATION: 97 % | BODY MASS INDEX: 22.35 KG/M2

## 2020-07-13 DIAGNOSIS — C64.2 METASTATIC RENAL CELL CARCINOMA, LEFT: ICD-10-CM

## 2020-07-13 DIAGNOSIS — C79.89 METASTATIC RENAL CELL CARCINOMA TO INTRA-ABDOMINAL SITE: ICD-10-CM

## 2020-07-13 DIAGNOSIS — C64.9 METASTATIC RENAL CELL CARCINOMA TO INTRA-ABDOMINAL SITE: ICD-10-CM

## 2020-07-13 DIAGNOSIS — C64.9 METASTATIC RENAL CELL CARCINOMA, UNSPECIFIED LATERALITY: Primary | ICD-10-CM

## 2020-07-13 DIAGNOSIS — Z99.2 END STAGE RENAL FAILURE ON DIALYSIS: ICD-10-CM

## 2020-07-13 DIAGNOSIS — N18.6 END STAGE RENAL FAILURE ON DIALYSIS: ICD-10-CM

## 2020-07-13 PROCEDURE — 99214 OFFICE O/P EST MOD 30 MIN: CPT | Mod: PBBFAC | Performed by: INTERNAL MEDICINE

## 2020-07-13 PROCEDURE — 99214 OFFICE O/P EST MOD 30 MIN: CPT | Mod: S$GLB,,, | Performed by: INTERNAL MEDICINE

## 2020-07-13 PROCEDURE — 99999 PR PBB SHADOW E&M-EST. PATIENT-LVL IV: CPT | Mod: PBBFAC,,, | Performed by: INTERNAL MEDICINE

## 2020-07-13 PROCEDURE — 99999 PR PBB SHADOW E&M-EST. PATIENT-LVL IV: ICD-10-PCS | Mod: PBBFAC,,, | Performed by: INTERNAL MEDICINE

## 2020-07-13 PROCEDURE — 99214 PR OFFICE/OUTPT VISIT, EST, LEVL IV, 30-39 MIN: ICD-10-PCS | Mod: S$GLB,,, | Performed by: INTERNAL MEDICINE

## 2020-07-13 NOTE — PROGRESS NOTES
"Subjective:       Patient ID: Eva Bloom is a 54 y.o. female.    Chief Complaint: RCC    HPI     54 y.o.female, patient of Dr. Rivera, to clinic for 8 week follow up and to review labs for recurrent metastatic renal cell carcinoma. Pt is currently taking cabozantinib daily since December 2016. Pt is on ESRD with HD every TTS. Patient tolerating treat extremely well with little to no side effects.    Today, she denies any mouth sores, nausea, vomiting, diarrhea, constipation,weight loss, chest pain, leg swelling, headache, dizziness, or mood changes.    ECOG 1. She is accompanied alone.    Oncologic History (From Chart and Patient):  Eva Bloom is a 54 y.o.female with ESRD on HD who was found to have two L renal masses. She underwent L lap nephrectomy on 1/12/16. 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 Margins were negative.  Shehealed from surgery well, but then developed a large ovarian mass.  This was removed by gyn along with multiple sites of metastatic disease in the pelvis.  Path was c/w recurrence of RCC.     11/20/16 Pelvic ultrasound reveals "Large heterogeneous cystic and solid mass which appears to arise from the right ovary with worrisome imaging features for malignancy.  Differential diagnosis includes malignant tumors such as serous or mucinous cystadenocarcinoma.  It is important to note that the patient is at risk for ovarian torsion due to the size of the mass.Multiple uterine fibroids are identified with the largest in the uterine fundus."    11/22/16 pathology reveals "FINAL PATHOLOGIC DIAGNOSIS-RIGHT OVARY AND FALLOPIAN TUBE, RIGHT SALPINGO-OOPHORECTOMY:  -Positive for malignancy, high grade carcinoma morphologically and immunohistochemically consistent with metastasis from the patient's known renal cell carcinoma"    1/23/17 MRI abdomen reveals "Status post " "left nephrectomy, the left nephrectomy bed appears normal.There is a small liver lesion and a small splenic lesion, they cannot be fully characterized but they are unchanged from 5/27/16 suggestive of benign lesions.Multiple right kidney cysts.Cholelithiasis.Soft tissue oval density noted within the mesentery adjacent to the aorta right iliac bifurcation, nonspecific, could represent an enlarged lymph node, it is unchanged from prior studies"    1/23/17- CT Chest reveals "Stable pulmonary nodules, likely benign. No new pulmonary parenchymal abnormalities are identified. Enlarging nodular foci in partially visualized left upper abdomen. Peritoneal implants not excluded. Multiple thyroid nodules, measuring up to 1.6 cm."    4/5/17-MRI abdomen reveals "Again seen are the T2 hyperintense hepatic and splenic lesions that appear similar to prior exam and possibly represents hemangiomas. There are right renal cysts and the patient is status post left nephrectomy."    7/3/17- MRI abdomen "Left nephrectomy.  Right renal cysts with no malignant features.Liver and splenic lesions consistent with cysts versus hemangiomas.Cholelithiasis.Right pleural effusion and right basal atelectasis.Small pericardial effusion.Body wall edema and mesenteric edema."  7/3/17- CT chest "In this patient with a history of left renal cell carcinoma, there are 2 right lung nodules which are stable compared to January 2017.  2 left lung nodules seen on that study are not well-seen today, may be related to a small amount of pleural fluid.Bilateral pleural effusions, mildly decreased compared to prior study.Small amount of pericardial fluid, new."    Review of Systems   Constitutional: Negative for activity change, appetite change, chills, fatigue and fever.   HENT: Negative for congestion, hearing loss, mouth sores, postnasal drip, sore throat, tinnitus and voice change.    Eyes: Negative for pain and visual disturbance.   Respiratory: Negative for " cough, shortness of breath and wheezing.    Cardiovascular: Negative for chest pain, palpitations and leg swelling.   Gastrointestinal: Negative for abdominal pain, constipation, diarrhea, nausea and vomiting.   Endocrine: Negative for cold intolerance and heat intolerance.   Genitourinary: Negative for difficulty urinating, dyspareunia, dysuria, frequency, menstrual problem, urgency, vaginal bleeding, vaginal discharge and vaginal pain.   Musculoskeletal: Negative for arthralgias and myalgias.   Skin: Negative for color change, rash and wound.   Allergic/Immunologic: Negative for environmental allergies and food allergies.   Neurological: Negative for weakness, numbness and headaches.   Hematological: Negative for adenopathy. Does not bruise/bleed easily.   Psychiatric/Behavioral: Negative for agitation, confusion, hallucinations and sleep disturbance. The patient is not nervous/anxious.    All other systems reviewed and are negative.      Allergies:  Review of patient's allergies indicates:   Allergen Reactions    Allopurinol      Other reaction(s): abnormal transaminases       Medications:  Current Outpatient Medications   Medication Sig Dispense Refill    acetaminophen (TYLENOL) 500 MG tablet Take 500 mg by mouth every 6 (six) hours as needed for Pain.      albuterol (PROVENTIL/VENTOLIN HFA) 90 mcg/actuation inhaler Inhale 1-2 puffs into the lungs every 4 to 6 hours as needed for Wheezing or Shortness of Breath (chest tightness). 1 Inhaler 6    aluminum-magnesium hydroxide-simethicone (MAALOX) 200-200-20 mg/5 mL Susp Take 30 mLs by mouth every 6 (six) hours as needed.       amLODIPine (NORVASC) 5 MG tablet TAKE 1 TABLET BY MOUTH EVERY DAY 90 tablet 3    benzonatate (TESSALON) 200 MG capsule Take 1 capsule (200 mg total) by mouth 3 (three) times daily as needed for Cough. 30 capsule 1    cabozantinib (CABOMETYX) 60 mg Tab Take 60 mg by mouth once daily. 30 tablet 11    cloNIDine 0.2 mg/24 hr td ptwk  (CATAPRES) 0.2 mg/24 hr PLACE 1 PATCH ONTO THE SKIN EVERY 7 DAYS. 12 patch 3    febuxostat (ULORIC) 40 mg Tab Take 1 tablet (40 mg total) by mouth once daily. (Patient not taking: Reported on 6/19/2020) 30 tablet 4    hydrALAZINE (APRESOLINE) 100 MG tablet TAKE 1 TABLET BY MOUTH EVERY 12 HOURS 180 tablet 3    inhalation spacing device Use as directed for inhalation. 1 Device 0    isosorbide dinitrate (ISORDIL) 30 MG Tab Take 1 tablet (30 mg total) by mouth 2 (two) times daily. 60 tablet 11    lidocaine-prilocaine (EMLA) cream APPLY ATLEAST 30 MINUTES BEFORE TREATMENT 3 TIMES A WEEK 30 g 11    metoprolol succinate (TOPROL-XL) 100 MG 24 hr tablet Take 1 tablet (100 mg total) by mouth once daily. 90 tablet 3    multivitamin-Ca-iron-minerals (ONE-A-DAY WOMENS FORMULA) 27-0.4 mg Tab Take by mouth. 1 Tablet Oral Every day      olmesartan (BENICAR) 40 MG tablet TAKE 1 TABLET BY MOUTH EVERY DAY 90 tablet 3    ondansetron (ZOFRAN-ODT) 8 MG TbDL Take 1 tablet (8 mg total) by mouth every 8 (eight) hours as needed. 30 tablet 1     No current facility-administered medications for this visit.        PMH:  Past Medical History:   Diagnosis Date    Anemia     Bronchitis 03/01/2017    Cancer 2016    kidney cancer    CMV (cytomegalovirus) antibody positive     Essential hypertension 9/23/2015    H/O herpes simplex type 2 infection     Herpes simplex type 1 antibody positive     History of kidney cancer     s/p left nephrectomy 1/2016    Hyperparathyroidism, unspecified     Hyperuricemia without signs of inflammatory arthritis and tophaceous disease     Kidney stones     LGSIL (low grade squamous intraepithelial dysplasia)     Myocardiopathy 7/21/2017    Prediabetes     Proteinuria     Renal disorder     Thyroid nodule     Urate nephropathy        PSH:  Past Surgical History:   Procedure Laterality Date    BREAST CYST EXCISION      COLONOSCOPY N/A 11/12/2015    NEPHRECTOMY-LAPAROSCOPIC Left 01/12/2016     PERITONEAL CATHETER INSERTION      Permacath insertion  01/12/2017    SALPINGOOPHORECTOMY Right 2016    [facility]---DAVINCI    TONSILLECTOMY      TUBAL LIGATION         FamHx:  Family History   Problem Relation Age of Onset    Hypertension Mother     Diabetes Father     Kidney disease Father     No Known Problems Son     No Known Problems Son     Diabetes Maternal Grandfather     No Known Problems Sister     No Known Problems Brother     No Known Problems Maternal Grandmother     No Known Problems Paternal Grandmother     No Known Problems Paternal Grandfather     Heart disease Sister     No Known Problems Sister     No Known Problems Brother     No Known Problems Maternal Aunt     No Known Problems Maternal Uncle     No Known Problems Paternal Aunt     No Known Problems Paternal Uncle     Breast cancer Neg Hx     Colon cancer Neg Hx     Cancer Neg Hx     Stroke Neg Hx     Amblyopia Neg Hx     Blindness Neg Hx     Cataracts Neg Hx     Glaucoma Neg Hx     Macular degeneration Neg Hx     Retinal detachment Neg Hx     Strabismus Neg Hx     Thyroid disease Neg Hx        SocHx:  Social History     Socioeconomic History    Marital status:      Spouse name: Not on file    Number of children: 2    Years of education: Not on file    Highest education level: Not on file   Occupational History    Occupation: [employer]     Employer: [id number]   Social Needs    Financial resource strain: Not on file    Food insecurity     Worry: Not on file     Inability: Not on file    Transportation needs     Medical: Not on file     Non-medical: Not on file   Tobacco Use    Smoking status: Never Smoker    Smokeless tobacco: Never Used   Substance and Sexual Activity    Alcohol use: No     Comment: . 2 children. works at [employer].    Drug use: No    Sexual activity: Yes     Partners: Male   Lifestyle    Physical activity     Days per week: Not on file     Minutes per session: Not on  file    Stress: Not on file   Relationships    Social connections     Talks on phone: Not on file     Gets together: Not on file     Attends [employer] service: Not on file     Active member of club or organization: Not on file     Attends meetings of clubs or organizations: Not on file     Relationship status: Not on file   Other Topics Concern    Not on file   Social History Narrative    Not on file         Objective:     LABS:  WBC   Date Value Ref Range Status   07/10/2020 4.53 3.90 - 12.70 K/uL Final     Hemoglobin   Date Value Ref Range Status   07/10/2020 11.4 (L) 12.0 - 16.0 g/dL Final     Hematocrit   Date Value Ref Range Status   07/10/2020 37.1 37.0 - 48.5 % Final     Platelets   Date Value Ref Range Status   07/10/2020 150 150 - 350 K/uL Final       Chemistry        Component Value Date/Time     07/10/2020 1005    K 4.3 07/10/2020 1005     07/10/2020 1005    CO2 27 07/10/2020 1005    BUN 53 (H) 07/10/2020 1005    CREATININE 7.6 (H) 07/10/2020 1005     (H) 07/10/2020 1005        Component Value Date/Time    CALCIUM 9.6 07/10/2020 1005    ALKPHOS 180 (H) 07/10/2020 1005    AST 49 (H) 07/10/2020 1005    ALT 38 07/10/2020 1005    BILITOT 0.7 07/10/2020 1005            Assessment:       No diagnosis found.      Plan:       1.  Metastatic Renal Cell Carcinoma:    - It is unlikely that surgery removed all sites of metastatic disease. Given that this is c/w her previous papillary disease, pt started on oral cabozantinib 60mg daily (for dual VEGF and MET inhibition), especially given recent data that shows cabo is far superior to student in the first line setting.  If pt progresses on this regimen, will switch to nivolumab.     Restaging scans show stable disease and pt tolerating cabozantinib very well. We will continue cabozantinib for now. Patient has minimal side effects from medication. We will plan for scans every 4 months.    RTC in 8 weeks with MRI AP and non-contrast CT Chest, labs  (CBC,CMP) and to see me.     Patient is in agreement with the proposed treatment plan. All questions were answered to the patient's satisfaction. Pt knows to call clinic if anything is needed before the next clinic visit.     I, Dr. Ben Rivera, personally spent more than 25 mins during this encounter, greater than 50% was spent in direct counseling and/or coordination of care.     Ben Rivera M.D., M.S., F.A.C.P.  Hematology/Oncology Attending  [facility]

## 2020-09-14 ENCOUNTER — HOSPITAL ENCOUNTER (OUTPATIENT)
Dept: RADIOLOGY | Facility: HOSPITAL | Age: 55
Discharge: HOME | End: 2020-09-14
Attending: INTERNAL MEDICINE
Payer: MEDICARE

## 2020-09-14 DIAGNOSIS — C64.9 METASTATIC RENAL CELL CARCINOMA, UNSPECIFIED LATERALITY: ICD-10-CM

## 2020-09-14 DIAGNOSIS — Z99.2 END STAGE RENAL FAILURE ON DIALYSIS: ICD-10-CM

## 2020-09-14 DIAGNOSIS — N18.6 END STAGE RENAL FAILURE ON DIALYSIS: ICD-10-CM

## 2020-09-14 DIAGNOSIS — C64.9 METASTATIC RENAL CELL CARCINOMA TO INTRA-ABDOMINAL SITE: ICD-10-CM

## 2020-09-14 DIAGNOSIS — C79.89 METASTATIC RENAL CELL CARCINOMA TO INTRA-ABDOMINAL SITE: ICD-10-CM

## 2020-09-14 DIAGNOSIS — C64.2 METASTATIC RENAL CELL CARCINOMA, LEFT: ICD-10-CM

## 2020-09-14 PROCEDURE — 71250 CT THORAX DX C-: CPT | Mod: TC

## 2020-09-14 PROCEDURE — 74181 MRI ABDOMEN W/O CONTRAST: CPT | Mod: TC

## 2020-09-14 PROCEDURE — 74181 MRI ABDOMEN W/O CONTRAST: CPT | Mod: 26,,, | Performed by: RADIOLOGY

## 2020-09-14 PROCEDURE — 71250 CT CHEST WITHOUT CONTRAST: ICD-10-PCS | Mod: 26,,, | Performed by: RADIOLOGY

## 2020-09-14 PROCEDURE — 71250 CT THORAX DX C-: CPT | Mod: 26,,, | Performed by: RADIOLOGY

## 2020-09-14 PROCEDURE — 74181 MRI ABDOMEN WITHOUT CONTRAST: ICD-10-PCS | Mod: 26,,, | Performed by: RADIOLOGY

## 2020-09-22 NOTE — PROGRESS NOTES
"Subjective:       Patient ID: Eva Bloom is a 54 y.o. female.    Chief Complaint: RCC    HPI     54 y.o.female, patient  to clinic for 8 week follow up and to review labs and scans for recurrent metastatic renal cell carcinoma. Pt is currently taking cabozantinib daily since December 2016. Pt is on ESRD with HD every TTS. Patient tolerating treat extremely well with little to no side effects.    Today, she denies any mouth sores, nausea, vomiting, diarrhea, constipation,weight loss, chest pain, leg swelling, headache, dizziness, or mood changes.    ECOG 1. She is accompanied alone.    Oncologic History (From Chart and Patient):  Eva Bloom is a 54 y.o.female with ESRD on HD who was found to have two L renal masses. She underwent L lap nephrectomy on 1/12/16. 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 Margins were negative.  Shehealed from surgery well, but then developed a large ovarian mass.  This was removed by gyn along with multiple sites of metastatic disease in the pelvis.  Path was c/w recurrence of RCC.     11/20/16 Pelvic ultrasound reveals "Large heterogeneous cystic and solid mass which appears to arise from the right ovary with worrisome imaging features for malignancy.  Differential diagnosis includes malignant tumors such as serous or mucinous cystadenocarcinoma.  It is important to note that the patient is at risk for ovarian torsion due to the size of the mass.Multiple uterine fibroids are identified with the largest in the uterine fundus."    11/22/16 pathology reveals "FINAL PATHOLOGIC DIAGNOSIS-RIGHT OVARY AND FALLOPIAN TUBE, RIGHT SALPINGO-OOPHORECTOMY:  -Positive for malignancy, high grade carcinoma morphologically and immunohistochemically consistent with metastasis from the patient's known renal cell carcinoma"    1/23/17 MRI abdomen reveals "Status post left " "nephrectomy, the left nephrectomy bed appears normal.There is a small liver lesion and a small splenic lesion, they cannot be fully characterized but they are unchanged from 5/27/16 suggestive of benign lesions.Multiple right kidney cysts.Cholelithiasis.Soft tissue oval density noted within the mesentery adjacent to the aorta right iliac bifurcation, nonspecific, could represent an enlarged lymph node, it is unchanged from prior studies"    1/23/17- CT Chest reveals "Stable pulmonary nodules, likely benign. No new pulmonary parenchymal abnormalities are identified. Enlarging nodular foci in partially visualized left upper abdomen. Peritoneal implants not excluded. Multiple thyroid nodules, measuring up to 1.6 cm."    4/5/17-MRI abdomen reveals "Again seen are the T2 hyperintense hepatic and splenic lesions that appear similar to prior exam and possibly represents hemangiomas. There are right renal cysts and the patient is status post left nephrectomy."    7/3/17- MRI abdomen "Left nephrectomy.  Right renal cysts with no malignant features.Liver and splenic lesions consistent with cysts versus hemangiomas.Cholelithiasis.Right pleural effusion and right basal atelectasis.Small pericardial effusion.Body wall edema and mesenteric edema."  7/3/17- CT chest "In this patient with a history of left renal cell carcinoma, there are 2 right lung nodules which are stable compared to January 2017.  2 left lung nodules seen on that study are not well-seen today, may be related to a small amount of pleural fluid.Bilateral pleural effusions, mildly decreased compared to prior study.Small amount of pericardial fluid, new."    Review of Systems   Constitutional: Negative for activity change, appetite change, chills, fatigue and fever.   HENT: Negative for congestion, hearing loss, mouth sores, postnasal drip, sore throat, tinnitus and voice change.    Eyes: Negative for pain and visual disturbance.   Respiratory: Negative for cough, " shortness of breath and wheezing.    Cardiovascular: Negative for chest pain, palpitations and leg swelling.   Gastrointestinal: Negative for abdominal pain, constipation, diarrhea, nausea and vomiting.   Endocrine: Negative for cold intolerance and heat intolerance.   Genitourinary: Negative for difficulty urinating, dyspareunia, dysuria, frequency, menstrual problem, urgency, vaginal bleeding, vaginal discharge and vaginal pain.   Musculoskeletal: Negative for arthralgias and myalgias.   Skin: Negative for color change, rash and wound.   Allergic/Immunologic: Negative for environmental allergies and food allergies.   Neurological: Negative for weakness, numbness and headaches.   Hematological: Negative for adenopathy. Does not bruise/bleed easily.   Psychiatric/Behavioral: Negative for agitation, confusion, hallucinations and sleep disturbance. The patient is not nervous/anxious.    All other systems reviewed and are negative.      Allergies:  Review of patient's allergies indicates:   Allergen Reactions    Allopurinol      Other reaction(s): abnormal transaminases       Medications:  Current Outpatient Medications   Medication Sig Dispense Refill    acetaminophen (TYLENOL) 500 MG tablet Take 500 mg by mouth every 6 (six) hours as needed for Pain.      albuterol (PROVENTIL/VENTOLIN HFA) 90 mcg/actuation inhaler Inhale 1-2 puffs into the lungs every 4 to 6 hours as needed for Wheezing or Shortness of Breath (chest tightness). 1 Inhaler 6    aluminum-magnesium hydroxide-simethicone (MAALOX) 200-200-20 mg/5 mL Susp Take 30 mLs by mouth every 6 (six) hours as needed.       amLODIPine (NORVASC) 5 MG tablet TAKE 1 TABLET BY MOUTH EVERY DAY 90 tablet 3    benzonatate (TESSALON) 200 MG capsule Take 1 capsule (200 mg total) by mouth 3 (three) times daily as needed for Cough. 30 capsule 1    cabozantinib (CABOMETYX) 60 mg Tab Take 60 mg by mouth once daily. 30 tablet 11    cloNIDine 0.2 mg/24 hr td ptwk (CATAPRES)  0.2 mg/24 hr PLACE 1 PATCH ONTO THE SKIN EVERY 7 DAYS. 12 patch 3    febuxostat (ULORIC) 40 mg Tab Take 1 tablet (40 mg total) by mouth once daily. (Patient not taking: Reported on 6/19/2020) 30 tablet 4    hydrALAZINE (APRESOLINE) 100 MG tablet TAKE 1 TABLET BY MOUTH EVERY 12 HOURS 180 tablet 3    inhalation spacing device Use as directed for inhalation. 1 Device 0    isosorbide dinitrate (ISORDIL) 30 MG Tab Take 1 tablet (30 mg total) by mouth 2 (two) times daily. 60 tablet 11    lidocaine-prilocaine (EMLA) cream APPLY ATLEAST 30 MINUTES BEFORE TREATMENT 3 TIMES A WEEK 30 g 11    metoprolol succinate (TOPROL-XL) 100 MG 24 hr tablet Take 1 tablet (100 mg total) by mouth once daily. 90 tablet 3    multivitamin-Ca-iron-minerals (ONE-A-DAY WOMENS FORMULA) 27-0.4 mg Tab Take by mouth. 1 Tablet Oral Every day      olmesartan (BENICAR) 40 MG tablet TAKE 1 TABLET BY MOUTH EVERY DAY 90 tablet 3     No current facility-administered medications for this visit.        PMH:  Past Medical History:   Diagnosis Date    Anemia     Bronchitis 03/01/2017    Cancer 2016    kidney cancer    CMV (cytomegalovirus) antibody positive     Essential hypertension 9/23/2015    H/O herpes simplex type 2 infection     Herpes simplex type 1 antibody positive     History of kidney cancer     s/p left nephrectomy 1/2016    Hyperparathyroidism, unspecified     Hyperuricemia without signs of inflammatory arthritis and tophaceous disease     Kidney stones     LGSIL (low grade squamous intraepithelial dysplasia)     Myocardiopathy 7/21/2017    Prediabetes     Proteinuria     Renal disorder     Thyroid nodule     Urate nephropathy        PSH:  Past Surgical History:   Procedure Laterality Date    BREAST CYST EXCISION      COLONOSCOPY N/A 11/12/2015    NEPHRECTOMY-LAPAROSCOPIC Left 01/12/2016    PERITONEAL CATHETER INSERTION      Permacath insertion  01/12/2017    SALPINGOOPHORECTOMY Right 2016    GALLITO---GENE     TONSILLECTOMY      TUBAL LIGATION         FamHx:  Family History   Problem Relation Age of Onset    Hypertension Mother     Diabetes Father     Kidney disease Father     No Known Problems Son     No Known Problems Son     Diabetes Maternal Grandfather     No Known Problems Sister     No Known Problems Brother     No Known Problems Maternal Grandmother     No Known Problems Paternal Grandmother     No Known Problems Paternal Grandfather     Heart disease Sister     No Known Problems Sister     No Known Problems Brother     No Known Problems Maternal Aunt     No Known Problems Maternal Uncle     No Known Problems Paternal Aunt     No Known Problems Paternal Uncle     Breast cancer Neg Hx     Colon cancer Neg Hx     Cancer Neg Hx     Stroke Neg Hx     Amblyopia Neg Hx     Blindness Neg Hx     Cataracts Neg Hx     Glaucoma Neg Hx     Macular degeneration Neg Hx     Retinal detachment Neg Hx     Strabismus Neg Hx     Thyroid disease Neg Hx        SocHx:  Social History     Socioeconomic History    Marital status:      Spouse name: Not on file    Number of children: 2    Years of education: Not on file    Highest education level: Not on file   Occupational History    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 2 children. works at [employer].    Drug use: No    Sexual activity: Yes     Partners: Male   Lifestyle    Physical activity     Days per week: Not on file     Minutes per session: Not on file    Stress: Not on file   Relationships    Social connections     Talks on phone: Not on file     Gets together: Not  on file     Attends Rastafarian service: Not on file     Active member of club or organization: Not on file     Attends meetings of clubs or organizations: Not on file     Relationship status: Not on file   Other Topics Concern    Not on file   Social History Narrative    Not on file         Objective:     LABS:  WBC   Date Value Ref Range Status   07/10/2020 4.53 3.90 - 12.70 K/uL Final     Hemoglobin   Date Value Ref Range Status   07/10/2020 11.4 (L) 12.0 - 16.0 g/dL Final     Hematocrit   Date Value Ref Range Status   07/10/2020 37.1 37.0 - 48.5 % Final     Platelets   Date Value Ref Range Status   07/10/2020 150 150 - 350 K/uL Final       Chemistry        Component Value Date/Time     07/10/2020 1005    K 4.3 07/10/2020 1005     07/10/2020 1005    CO2 27 07/10/2020 1005    BUN 53 (H) 07/10/2020 1005    CREATININE 7.6 (H) 07/10/2020 1005     (H) 07/10/2020 1005        Component Value Date/Time    CALCIUM 9.6 07/10/2020 1005    ALKPHOS 180 (H) 07/10/2020 1005    AST 49 (H) 07/10/2020 1005    ALT 38 07/10/2020 1005    BILITOT 0.7 07/10/2020 1005            Assessment:       1. Metastatic renal cell carcinoma, unspecified laterality    2. Metastatic renal cell carcinoma, left    3. Metastatic renal cell carcinoma to intra-abdominal site    4. End stage renal failure on dialysis          Plan:       1.  Metastatic Renal Cell Carcinoma:    - It is unlikely that surgery removed all sites of metastatic disease. Given that this is c/w her previous papillary disease, pt started on oral cabozantinib 60mg daily (for dual VEGF and MET inhibition), especially given recent data that shows cabo is far superior to student in the first line setting.  If pt progresses on this regimen, will switch to nivolumab.     Restaging scans show stable disease and pt tolerating cabozantinib very well. We will continue cabozantinib for now. Patient has minimal side effects from medication. We will plan for scans every 4  months.    RTC in 8 weeks labs (CBC,CMP) and to see Liset.     Patient is in agreement with the proposed treatment plan. All questions were answered to the patient's satisfaction. Pt knows to call clinic if anything is needed before the next clinic visit.     I, Dr. Ben Rivera, personally spent more than 25 mins during this encounter, greater than 50% was spent in direct counseling and/or coordination of care.     Ben Rivera M.D., M.S., F.A.C.P.  Hematology/Oncology Attending  [facility]

## 2020-09-23 ENCOUNTER — OFFICE VISIT (OUTPATIENT)
Dept: HEMATOLOGY/ONCOLOGY | Facility: CLINIC | Age: 55
End: 2020-09-23
Payer: COMMERCIAL

## 2020-09-23 ENCOUNTER — LAB VISIT (OUTPATIENT)
Dept: LAB | Facility: HOSPITAL | Age: 55
End: 2020-09-23
Payer: COMMERCIAL

## 2020-09-23 VITALS
OXYGEN SATURATION: 95 % | DIASTOLIC BLOOD PRESSURE: 72 MMHG | BODY MASS INDEX: 21.76 KG/M2 | WEIGHT: 127.44 LBS | HEIGHT: 64 IN | RESPIRATION RATE: 16 BRPM | TEMPERATURE: 98 F | SYSTOLIC BLOOD PRESSURE: 146 MMHG | HEART RATE: 68 BPM

## 2020-09-23 DIAGNOSIS — C79.89 METASTATIC RENAL CELL CARCINOMA TO INTRA-ABDOMINAL SITE: ICD-10-CM

## 2020-09-23 DIAGNOSIS — C64.9 METASTATIC RENAL CELL CARCINOMA, UNSPECIFIED LATERALITY: Primary | ICD-10-CM

## 2020-09-23 DIAGNOSIS — C64.9 METASTATIC RENAL CELL CARCINOMA TO INTRA-ABDOMINAL SITE: ICD-10-CM

## 2020-09-23 DIAGNOSIS — C64.2 METASTATIC RENAL CELL CARCINOMA, LEFT: ICD-10-CM

## 2020-09-23 DIAGNOSIS — Z99.2 END STAGE RENAL FAILURE ON DIALYSIS: ICD-10-CM

## 2020-09-23 DIAGNOSIS — N18.6 END STAGE RENAL FAILURE ON DIALYSIS: ICD-10-CM

## 2020-09-23 DIAGNOSIS — C64.9 METASTATIC RENAL CELL CARCINOMA, UNSPECIFIED LATERALITY: ICD-10-CM

## 2020-09-23 LAB
ALBUMIN SERPL BCP-MCNC: 2.6 G/DL (ref 3.5–5.2)
ALP SERPL-CCNC: 178 U/L (ref 55–135)
ALT SERPL W/O P-5'-P-CCNC: 26 U/L (ref 10–44)
ANION GAP SERPL CALC-SCNC: 13 MMOL/L (ref 8–16)
AST SERPL-CCNC: 34 U/L (ref 10–40)
BILIRUB SERPL-MCNC: 0.7 MG/DL (ref 0.1–1)
BUN SERPL-MCNC: 54 MG/DL (ref 6–20)
CALCIUM SERPL-MCNC: 8.8 MG/DL (ref 8.7–10.5)
CHLORIDE SERPL-SCNC: 103 MMOL/L (ref 95–110)
CO2 SERPL-SCNC: 24 MMOL/L (ref 23–29)
CREAT SERPL-MCNC: 8 MG/DL (ref 0.5–1.4)
ERYTHROCYTE [DISTWIDTH] IN BLOOD BY AUTOMATED COUNT: 15.7 % (ref 11.5–14.5)
EST. GFR  (AFRICAN AMERICAN): 5.9 ML/MIN/1.73 M^2
EST. GFR  (NON AFRICAN AMERICAN): 5.1 ML/MIN/1.73 M^2
GLUCOSE SERPL-MCNC: 110 MG/DL (ref 70–110)
HCT VFR BLD AUTO: 30.4 % (ref 37–48.5)
HGB BLD-MCNC: 9.4 G/DL (ref 12–16)
IMM GRANULOCYTES # BLD AUTO: 0.01 K/UL (ref 0–0.04)
MCH RBC QN AUTO: 33.8 PG (ref 27–31)
MCHC RBC AUTO-ENTMCNC: 30.9 G/DL (ref 32–36)
MCV RBC AUTO: 109 FL (ref 82–98)
NEUTROPHILS # BLD AUTO: 3.8 K/UL (ref 1.8–7.7)
PLATELET # BLD AUTO: 162 K/UL (ref 150–350)
PMV BLD AUTO: 10.5 FL (ref 9.2–12.9)
POTASSIUM SERPL-SCNC: 3.9 MMOL/L (ref 3.5–5.1)
PROT SERPL-MCNC: 5.6 G/DL (ref 6–8.4)
RBC # BLD AUTO: 2.78 M/UL (ref 4–5.4)
SODIUM SERPL-SCNC: 140 MMOL/L (ref 136–145)
WBC # BLD AUTO: 5.45 K/UL (ref 3.9–12.7)

## 2020-09-23 PROCEDURE — 80053 COMPREHEN METABOLIC PANEL: CPT

## 2020-09-23 PROCEDURE — 99999 PR PBB SHADOW E&M-EST. PATIENT-LVL IV: CPT | Mod: PBBFAC,,, | Performed by: INTERNAL MEDICINE

## 2020-09-23 PROCEDURE — 99214 OFFICE O/P EST MOD 30 MIN: CPT | Mod: PBBFAC | Performed by: INTERNAL MEDICINE

## 2020-09-23 PROCEDURE — 36415 COLL VENOUS BLD VENIPUNCTURE: CPT

## 2020-09-23 PROCEDURE — 99999 PR PBB SHADOW E&M-EST. PATIENT-LVL IV: ICD-10-PCS | Mod: PBBFAC,,, | Performed by: INTERNAL MEDICINE

## 2020-09-23 PROCEDURE — 99214 PR OFFICE/OUTPT VISIT, EST, LEVL IV, 30-39 MIN: ICD-10-PCS | Mod: S$GLB,,, | Performed by: INTERNAL MEDICINE

## 2020-09-23 PROCEDURE — 85027 COMPLETE CBC AUTOMATED: CPT

## 2020-09-23 PROCEDURE — 99214 OFFICE O/P EST MOD 30 MIN: CPT | Mod: S$GLB,,, | Performed by: INTERNAL MEDICINE

## 2020-10-01 ENCOUNTER — TELEPHONE (OUTPATIENT)
Dept: VASCULAR SURGERY | Facility: CLINIC | Age: 55
End: 2020-10-01

## 2020-10-01 DIAGNOSIS — N18.6 ESRD (END STAGE RENAL DISEASE) ON DIALYSIS: Primary | ICD-10-CM

## 2020-10-01 DIAGNOSIS — Z99.2 ESRD (END STAGE RENAL DISEASE) ON DIALYSIS: Primary | ICD-10-CM

## 2020-10-01 NOTE — TELEPHONE ENCOUNTER
Contacted patient in response to message. States she has been experiencing prolonged bleeding after HD. Next day appointment scheduled, pt verified.   ----- Message from Annetta Olmos sent at 10/1/2020  2:02 PM CDT -----  Regarding: Pt  Reason: Pt calling to schedule F/u to eval. Please call     Communication: [phone]

## 2020-10-02 ENCOUNTER — HOSPITAL ENCOUNTER (OUTPATIENT)
Dept: VASCULAR SURGERY | Facility: CLINIC | Age: 55
Discharge: HOME | End: 2020-10-02
Attending: SURGERY
Payer: COMMERCIAL

## 2020-10-02 ENCOUNTER — OFFICE VISIT (OUTPATIENT)
Dept: VASCULAR SURGERY | Facility: CLINIC | Age: 55
End: 2020-10-02
Payer: COMMERCIAL

## 2020-10-02 VITALS
WEIGHT: 125 LBS | HEIGHT: 64 IN | HEART RATE: 82 BPM | BODY MASS INDEX: 21.34 KG/M2 | RESPIRATION RATE: 18 BRPM | TEMPERATURE: 99 F | DIASTOLIC BLOOD PRESSURE: 78 MMHG | SYSTOLIC BLOOD PRESSURE: 153 MMHG

## 2020-10-02 DIAGNOSIS — Z99.2 ESRD (END STAGE RENAL DISEASE) ON DIALYSIS: ICD-10-CM

## 2020-10-02 DIAGNOSIS — N18.6 ESRD (END STAGE RENAL DISEASE) ON DIALYSIS: Primary | ICD-10-CM

## 2020-10-02 DIAGNOSIS — Z99.2 ESRD (END STAGE RENAL DISEASE) ON DIALYSIS: Primary | ICD-10-CM

## 2020-10-02 DIAGNOSIS — N18.6 ESRD (END STAGE RENAL DISEASE) ON DIALYSIS: ICD-10-CM

## 2020-10-02 PROCEDURE — 93990 DOPPLER FLOW TESTING: CPT | Mod: S$GLB,,, | Performed by: SURGERY

## 2020-10-02 PROCEDURE — 99214 OFFICE O/P EST MOD 30 MIN: CPT | Mod: PBBFAC,25 | Performed by: SURGERY

## 2020-10-02 PROCEDURE — 99999 PR PBB SHADOW E&M-EST. PATIENT-LVL IV: ICD-10-PCS | Mod: PBBFAC,,, | Performed by: SURGERY

## 2020-10-02 PROCEDURE — 93990 PR DUPLEX HEMODIALYSIS ACCESS: ICD-10-PCS | Mod: S$GLB,,, | Performed by: SURGERY

## 2020-10-02 PROCEDURE — 99213 OFFICE O/P EST LOW 20 MIN: CPT | Mod: S$GLB,,, | Performed by: SURGERY

## 2020-10-02 PROCEDURE — 99999 PR PBB SHADOW E&M-EST. PATIENT-LVL IV: CPT | Mod: PBBFAC,,, | Performed by: SURGERY

## 2020-10-02 PROCEDURE — 99213 PR OFFICE/OUTPT VISIT, EST, LEVL III, 20-29 MIN: ICD-10-PCS | Mod: S$GLB,,, | Performed by: SURGERY

## 2020-10-02 PROCEDURE — 93990 DOPPLER FLOW TESTING: CPT | Mod: PBBFAC | Performed by: SURGERY

## 2020-10-02 NOTE — PROGRESS NOTES
See Dr. Muñoz's prior notes, review of systems, family history and social history are   unchanged.    HISTORY OF PRESENT ILLNESS:  A 55 y.o. female with end-stage renal disease,   well known to Dr. Muñoz, status post,  1.  Covered stent placement, left AV fistula outflow tract, 03/06/2017.  2.  Left upper arm 4-7 tapered AV graft on 01/12/2017.    Of note, at the time of surgery was found to have an exceptionally thin and   friable 4 mm brachial vein, which she used outflow and which she returned with   the stenosis that was treated empirically with a Covered stent because of the   risk of rupture.    10/2/20: returns after having issues with bleeding during dialysis on Saturday.  She had pain bruising after this session after she was stuck higher than normal on her graft.  On Tuesday she had prolonged bleeding, taking 30 minutes of pressure to cease.  She dialyzed without issue on Thursday. She complains of mild pain and swelling in her inner arm.  She has not required any pain medication for this.  Graft duplex suggests no stenosis and >4L flow!    10/29/19:  She presents because of question of modest erythema around her graft.  She has not had any trouble with the graft per se    6/5/20: Returns for surveillance evaluation. Doing well. No trouble at HD. Redness about graft resolved.     PHYSICAL EXAMINATION:  VITAL SIGNS:  See nursing note.  EXTREMITIES:  Left arm shows a good thrill with minimal pulsatility in the graft.  2+ radial pulse. There is no fluctuance.  Normal motor sensory function of hand    IMAGING:  Duplex of the graft shows it to be patent no significant stenosis. Stent patent with no stenosis.  Flow volume is >4.0 L.  No mention of pseudoaneurysm or large hematoma    ASSESSMENT:  Well-functioning left upper arm AV graft with Viabahn stent graft outflow treatment.  She likely had an extravasation event with hematoma which has not recurred.    PLAN:    -6 month follow up for graft  surveillance.  Continue to dialyze as normal  -RTC PRN should pain worsen or swelling not decrease in a few weeks    Praneeth Childers MD  Vascular Fellow PGY6    VASCULAR STAFF    I have personally taken the history and examined this patient and agree with the resident's note as stated above    MANPREET Muñoz III, MD, FACS  Professor and Chief, Vascular and Endovascular Surgery

## 2020-10-12 ENCOUNTER — TELEPHONE (OUTPATIENT)
Dept: FAMILY MEDICINE | Facility: CLINIC | Age: 55
End: 2020-10-12

## 2020-10-12 DIAGNOSIS — Z12.31 ENCOUNTER FOR SCREENING MAMMOGRAM FOR MALIGNANT NEOPLASM OF BREAST: Primary | ICD-10-CM

## 2020-10-12 NOTE — TELEPHONE ENCOUNTER
----- Message from Valerie Anderson sent at 10/12/2020  8:53 AM CDT -----  Contact: MANNIE POWERS [[id number]]  Name of Who is Calling: MANNIE POWERS [[id number]]      What is the request in detail: Patient would like orders for mammogram. Please call       Can the clinic reply by [facility]: no      [facility]: [phone] (home)

## 2020-10-19 ENCOUNTER — TELEPHONE (OUTPATIENT)
Dept: FAMILY MEDICINE | Facility: CLINIC | Age: 55
End: 2020-10-19

## 2020-10-19 DIAGNOSIS — Z12.11 ENCOUNTER FOR SCREENING COLONOSCOPY: Primary | ICD-10-CM

## 2020-10-19 NOTE — TELEPHONE ENCOUNTER
----- Message from Valentine Acosta sent at 10/19/2020  8:41 AM CDT -----  Contact: Patient [phone]  Type: Patient Call Back    Who called: Patient    What is the request in detail: Would like to speak to nurse in regards to getting a Colonoscopy scheduled. Please call.    Would the patient rather a call back or a response via [facility]? Call back    Best call back number: [phone]

## 2020-10-23 ENCOUNTER — TELEPHONE (OUTPATIENT)
Dept: NEPHROLOGY | Facility: CLINIC | Age: 55
End: 2020-10-23

## 2020-10-23 ENCOUNTER — TELEPHONE (OUTPATIENT)
Dept: SURGERY | Facility: CLINIC | Age: 55
End: 2020-10-23

## 2020-10-23 DIAGNOSIS — N25.81 HYPERPARATHYROIDISM, SECONDARY RENAL: ICD-10-CM

## 2020-10-23 DIAGNOSIS — N18.6 ESRD (END STAGE RENAL DISEASE): Primary | ICD-10-CM

## 2020-10-23 NOTE — TELEPHONE ENCOUNTER
Left message on patient's voicemail for her to call for an appointment with Dr. Wallace as referred by Dr. Hart for possible parathyroidectomy.  Direct phone number given for her to call back.

## 2020-10-26 ENCOUNTER — HOSPITAL ENCOUNTER (OUTPATIENT)
Dept: RADIOLOGY | Facility: HOSPITAL | Age: 55
Discharge: HOME | End: 2020-10-26
Attending: INTERNAL MEDICINE
Payer: MEDICARE

## 2020-10-26 VITALS — HEIGHT: 64 IN | BODY MASS INDEX: 21.34 KG/M2 | WEIGHT: 125 LBS

## 2020-10-26 DIAGNOSIS — Z12.31 ENCOUNTER FOR SCREENING MAMMOGRAM FOR MALIGNANT NEOPLASM OF BREAST: ICD-10-CM

## 2020-10-26 PROCEDURE — 77063 MAMMO DIGITAL SCREENING BILAT WITH TOMO: ICD-10-PCS | Mod: 26,,, | Performed by: RADIOLOGY

## 2020-10-26 PROCEDURE — 77067 MAMMO DIGITAL SCREENING BILAT WITH TOMO: ICD-10-PCS | Mod: 26,,, | Performed by: RADIOLOGY

## 2020-10-26 PROCEDURE — 77067 SCR MAMMO BI INCL CAD: CPT | Mod: TC,PO

## 2020-10-26 PROCEDURE — 77067 SCR MAMMO BI INCL CAD: CPT | Mod: 26,,, | Performed by: RADIOLOGY

## 2020-10-26 PROCEDURE — 77063 BREAST TOMOSYNTHESIS BI: CPT | Mod: 26,,, | Performed by: RADIOLOGY

## 2020-10-30 ENCOUNTER — TELEPHONE (OUTPATIENT)
Dept: SURGERY | Facility: CLINIC | Age: 55
End: 2020-10-30

## 2020-10-30 NOTE — TELEPHONE ENCOUNTER
Left message on patient's voicemail for her to call for an appointment with Dr. Holt regarding her parathyroid.  Direct phone number given.

## 2020-10-30 NOTE — PROGRESS NOTES
"Subjective:       Patient ID: Eva Bloom is a 55 y.o. female.    Chief Complaint: RCC    HPI     55 y.o.female, patient of Dr. Shahid, to clinic for 8 week follow up and to review labs for recurrent metastatic renal cell carcinoma. Pt is currently taking cabozantinib daily since December 2016. Pt is on ESRD with HD every TTS. Patient tolerating treat extremely well with little to no side effects.    Today, she denies any mouth sores, nausea, vomiting, diarrhea, constipation,weight loss, chest pain, leg swelling, headache, dizziness, or mood changes.    ECOG 1. She is accompanied alone.    Oncologic History (From Chart and Patient):  Eva Bloom is a 55 y.o.female with ESRD on HD who was found to have two L renal masses. She underwent L lap nephrectomy on 1/12/16. 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 Margins were negative.  Shehealed from surgery well, but then developed a large ovarian mass.  This was removed by gyn along with multiple sites of metastatic disease in the pelvis.  Path was c/w recurrence of RCC.     11/20/16 Pelvic ultrasound reveals "Large heterogeneous cystic and solid mass which appears to arise from the right ovary with worrisome imaging features for malignancy.  Differential diagnosis includes malignant tumors such as serous or mucinous cystadenocarcinoma.  It is important to note that the patient is at risk for ovarian torsion due to the size of the mass.Multiple uterine fibroids are identified with the largest in the uterine fundus."    11/22/16 pathology reveals "FINAL PATHOLOGIC DIAGNOSIS-RIGHT OVARY AND FALLOPIAN TUBE, RIGHT SALPINGO-OOPHORECTOMY:  -Positive for malignancy, high grade carcinoma morphologically and immunohistochemically consistent with metastasis from the patient's known renal cell carcinoma"    1/23/17 MRI abdomen reveals "Status post " "left nephrectomy, the left nephrectomy bed appears normal.There is a small liver lesion and a small splenic lesion, they cannot be fully characterized but they are unchanged from 5/27/16 suggestive of benign lesions.Multiple right kidney cysts.Cholelithiasis.Soft tissue oval density noted within the mesentery adjacent to the aorta right iliac bifurcation, nonspecific, could represent an enlarged lymph node, it is unchanged from prior studies"    1/23/17- CT Chest reveals "Stable pulmonary nodules, likely benign. No new pulmonary parenchymal abnormalities are identified. Enlarging nodular foci in partially visualized left upper abdomen. Peritoneal implants not excluded. Multiple thyroid nodules, measuring up to 1.6 cm."    4/5/17-MRI abdomen reveals "Again seen are the T2 hyperintense hepatic and splenic lesions that appear similar to prior exam and possibly represents hemangiomas. There are right renal cysts and the patient is status post left nephrectomy."    7/3/17- MRI abdomen "Left nephrectomy.  Right renal cysts with no malignant features.Liver and splenic lesions consistent with cysts versus hemangiomas.Cholelithiasis.Right pleural effusion and right basal atelectasis.Small pericardial effusion.Body wall edema and mesenteric edema."  7/3/17- CT chest "In this patient with a history of left renal cell carcinoma, there are 2 right lung nodules which are stable compared to January 2017.  2 left lung nodules seen on that study are not well-seen today, may be related to a small amount of pleural fluid.Bilateral pleural effusions, mildly decreased compared to prior study.Small amount of pericardial fluid, new."    Review of Systems   Constitutional: Negative for activity change, appetite change, chills, fatigue and fever.   HENT: Negative for congestion, hearing loss, mouth sores, postnasal drip, sore throat, tinnitus and voice change.    Eyes: Negative for pain and visual disturbance.   Respiratory: Negative for " cough, shortness of breath and wheezing.    Cardiovascular: Negative for chest pain, palpitations and leg swelling.   Gastrointestinal: Negative for abdominal pain, constipation, diarrhea, nausea and vomiting.   Endocrine: Negative for cold intolerance and heat intolerance.   Genitourinary: Negative for difficulty urinating, dyspareunia, dysuria, frequency, menstrual problem, urgency, vaginal bleeding, vaginal discharge and vaginal pain.   Musculoskeletal: Negative for arthralgias and myalgias.   Skin: Negative for color change, rash and wound.   Allergic/Immunologic: Negative for environmental allergies and food allergies.   Neurological: Negative for weakness, numbness and headaches.   Hematological: Negative for adenopathy. Does not bruise/bleed easily.   Psychiatric/Behavioral: Negative for agitation, confusion, hallucinations and sleep disturbance. The patient is not nervous/anxious.    All other systems reviewed and are negative.      Allergies:  Review of patient's allergies indicates:   Allergen Reactions    Allopurinol      Other reaction(s): abnormal transaminases       Medications:  Current Outpatient Medications   Medication Sig Dispense Refill    acetaminophen (TYLENOL) 500 MG tablet Take 500 mg by mouth every 6 (six) hours as needed for Pain.      albuterol (PROVENTIL/VENTOLIN HFA) 90 mcg/actuation inhaler Inhale 1-2 puffs into the lungs every 4 to 6 hours as needed for Wheezing or Shortness of Breath (chest tightness). (Patient not taking: Reported on 10/2/2020) 1 Inhaler 6    aluminum-magnesium hydroxide-simethicone (MAALOX) 200-200-20 mg/5 mL Susp Take 30 mLs by mouth every 6 (six) hours as needed.       amLODIPine (NORVASC) 5 MG tablet TAKE 1 TABLET BY MOUTH EVERY DAY 90 tablet 3    benzonatate (TESSALON) 200 MG capsule Take 1 capsule (200 mg total) by mouth 3 (three) times daily as needed for Cough. (Patient not taking: Reported on 10/2/2020) 30 capsule 1    cabozantinib (CABOMETYX) 60 mg  Tab Take 60 mg by mouth once daily. 30 tablet 11    cloNIDine 0.2 mg/24 hr td ptwk (CATAPRES) 0.2 mg/24 hr PLACE 1 PATCH ONTO THE SKIN EVERY 7 DAYS. 12 patch 3    febuxostat (ULORIC) 40 mg Tab Take 1 tablet (40 mg total) by mouth once daily. (Patient not taking: Reported on 6/19/2020) 30 tablet 4    hydrALAZINE (APRESOLINE) 100 MG tablet TAKE 1 TABLET BY MOUTH EVERY 12 HOURS 180 tablet 3    inhalation spacing device Use as directed for inhalation. (Patient not taking: Reported on 10/2/2020) 1 Device 0    isosorbide dinitrate (ISORDIL) 30 MG Tab Take 1 tablet (30 mg total) by mouth 2 (two) times daily. 60 tablet 11    lidocaine-prilocaine (EMLA) cream APPLY ATLEAST 30 MINUTES BEFORE TREATMENT 3 TIMES A WEEK 30 g 11    metoprolol succinate (TOPROL-XL) 100 MG 24 hr tablet Take 1 tablet (100 mg total) by mouth once daily. 90 tablet 3    multivitamin-Ca-iron-minerals (ONE-A-DAY WOMENS FORMULA) 27-0.4 mg Tab Take by mouth. 1 Tablet Oral Every day      olmesartan (BENICAR) 40 MG tablet TAKE 1 TABLET BY MOUTH EVERY DAY 90 tablet 3     No current facility-administered medications for this visit.        PMH:  Past Medical History:   Diagnosis Date    Anemia     Bronchitis 03/01/2017    Cancer 2016    kidney cancer    CMV (cytomegalovirus) antibody positive     Essential hypertension 9/23/2015    H/O herpes simplex type 2 infection     Herpes simplex type 1 antibody positive     History of kidney cancer     s/p left nephrectomy 1/2016    Hyperparathyroidism, unspecified     Hyperuricemia without signs of inflammatory arthritis and tophaceous disease     Kidney stones     LGSIL (low grade squamous intraepithelial dysplasia)     Myocardiopathy 7/21/2017    Prediabetes     Proteinuria     Renal disorder     Thyroid nodule     Urate nephropathy        PSH:  Past Surgical History:   Procedure Laterality Date    BREAST CYST EXCISION      COLONOSCOPY N/A 11/12/2015    NEPHRECTOMY-LAPAROSCOPIC Left  01/12/2016    PERITONEAL CATHETER INSERTION      Permacath insertion  01/12/2017    SALPINGOOPHORECTOMY Right 2016    KJB---DAVINCI    TONSILLECTOMY      TUBAL LIGATION         FamHx:  Family History   Problem Relation Age of Onset    Hypertension Mother     Diabetes Father     Kidney disease Father     No Known Problems Son     No Known Problems Son     Diabetes Maternal Grandfather     No Known Problems Sister     No Known Problems Brother     No Known Problems Maternal Grandmother     No Known Problems Paternal Grandmother     No Known Problems Paternal Grandfather     Heart disease Sister     No Known Problems Sister     No Known Problems Brother     No Known Problems Maternal Aunt     No Known Problems Maternal Uncle     No Known Problems Paternal Aunt     No Known Problems Paternal Uncle     Breast cancer Neg Hx     Colon cancer Neg Hx     Cancer Neg Hx     Stroke Neg Hx     Amblyopia Neg Hx     Blindness Neg Hx     Cataracts Neg Hx     Glaucoma Neg Hx     Macular degeneration Neg Hx     Retinal detachment Neg Hx     Strabismus Neg Hx     Thyroid disease Neg Hx        SocHx:  Social History     Socioeconomic History    Marital status:      Spouse name: Not on file    Number of children: 2    Years of education: Not on file    Highest education level: Not on file   Occupational History    Occupation: [employer]     Employer: [id number]   Social Needs    Financial resource strain: Not on file    Food insecurity     Worry: Not on file     Inability: Not on file    Transportation needs     Medical: Not on file     Non-medical: Not on file   Tobacco Use    Smoking status: Never Smoker    Smokeless tobacco: Never Used   Substance and Sexual Activity    Alcohol use: No     Comment: . 2 children. works at [employer].    Drug use: No    Sexual activity: Yes     Partners: Male   Lifestyle    Physical activity     Days per week: Not on file     Minutes per  session: Not on file    Stress: Not on file   Relationships    Social connections     Talks on phone: Not on file     Gets together: Not on file     Attends [employer] service: Not on file     Active member of club or organization: Not on file     Attends meetings of clubs or organizations: Not on file     Relationship status: Not on file   Other Topics Concern    Not on file   Social History Narrative    Not on file         Objective:     LABS:  WBC   Date Value Ref Range Status   09/23/2020 5.45 3.90 - 12.70 K/uL Final     Hemoglobin   Date Value Ref Range Status   09/23/2020 9.4 (L) 12.0 - 16.0 g/dL Final     Hematocrit   Date Value Ref Range Status   09/23/2020 30.4 (L) 37.0 - 48.5 % Final     Platelets   Date Value Ref Range Status   09/23/2020 162 150 - 350 K/uL Final       Chemistry        Component Value Date/Time     09/23/2020 0757    K 3.9 09/23/2020 0757     09/23/2020 0757    CO2 24 09/23/2020 0757    BUN 54 (H) 09/23/2020 0757    CREATININE 8.0 (H) 09/23/2020 0757     09/23/2020 0757        Component Value Date/Time    CALCIUM 8.8 09/23/2020 0757    ALKPHOS 178 (H) 09/23/2020 0757    AST 34 09/23/2020 0757    ALT 26 09/23/2020 0757    BILITOT 0.7 09/23/2020 0757            Assessment:       1. Metastatic renal cell carcinoma, left    2. Metastatic renal cell carcinoma to intra-abdominal site    3. End stage renal failure on dialysis    4. S/p nephrectomy left    5. Anemia due to end stage renal disease    6. Combined systolic and diastolic cardiac dysfunction    7. Hypertension, renal disease          Plan:       1,2-  Metastatic Renal Cell Carcinoma:    - It is unlikely that surgery removed all sites of metastatic disease. Given that this is c/w her previous papillary disease, pt started on oral cabozantinib 60mg daily (for dual VEGF and MET inhibition), especially given recent data that shows cabo is far superior to student in the first line setting.  If pt progresses on this  regimen, will switch to nivolumab.     Restaging scans show stable disease and pt tolerating cabozantinib very well. We will continue cabozantinib for now. Patient has minimal side effects from medication. We will plan for scans every 4 months.    RTC in 8 weeks with CT CAP, labs (CBC,CMP) and to see     3,4- per nephrology team.    5- Stable, Monitor.    6,7- Per cardiology.    Patient is in agreement with the proposed treatment plan. All questions were answered to the patient's satisfaction. Pt knows to call clinic if anything is needed before the next clinic visit.    Liset Ngo, MSN, APRN, FNP-C  Hematology and Medical Oncology  Nurse Practitioner to Dr. Ben Rivera  Nurse Practitioner, [facility]

## 2020-11-02 ENCOUNTER — OFFICE VISIT (OUTPATIENT)
Dept: HEMATOLOGY/ONCOLOGY | Facility: CLINIC | Age: 55
End: 2020-11-02
Payer: COMMERCIAL

## 2020-11-02 ENCOUNTER — LAB VISIT (OUTPATIENT)
Dept: LAB | Facility: HOSPITAL | Age: 55
End: 2020-11-02
Attending: INTERNAL MEDICINE
Payer: MEDICARE

## 2020-11-02 VITALS
HEIGHT: 64 IN | HEART RATE: 85 BPM | WEIGHT: 127 LBS | DIASTOLIC BLOOD PRESSURE: 93 MMHG | BODY MASS INDEX: 21.68 KG/M2 | SYSTOLIC BLOOD PRESSURE: 177 MMHG | TEMPERATURE: 98 F

## 2020-11-02 DIAGNOSIS — C64.2 METASTATIC RENAL CELL CARCINOMA, LEFT: Primary | ICD-10-CM

## 2020-11-02 DIAGNOSIS — E04.1 THYROID NODULE: ICD-10-CM

## 2020-11-02 DIAGNOSIS — I51.89 COMBINED SYSTOLIC AND DIASTOLIC CARDIAC DYSFUNCTION: ICD-10-CM

## 2020-11-02 DIAGNOSIS — N18.4 CKD (CHRONIC KIDNEY DISEASE) STAGE 4, GFR 15-29 ML/MIN: ICD-10-CM

## 2020-11-02 DIAGNOSIS — I12.9 HYPERTENSION, RENAL DISEASE: ICD-10-CM

## 2020-11-02 DIAGNOSIS — C79.89 METASTATIC RENAL CELL CARCINOMA TO INTRA-ABDOMINAL SITE: ICD-10-CM

## 2020-11-02 DIAGNOSIS — N18.6 END STAGE RENAL FAILURE ON DIALYSIS: ICD-10-CM

## 2020-11-02 DIAGNOSIS — N18.6 ANEMIA DUE TO END STAGE RENAL DISEASE: ICD-10-CM

## 2020-11-02 DIAGNOSIS — D63.1 ANEMIA DUE TO END STAGE RENAL DISEASE: ICD-10-CM

## 2020-11-02 DIAGNOSIS — N17.9 ACUTE ON CHRONIC KIDNEY FAILURE: ICD-10-CM

## 2020-11-02 DIAGNOSIS — N18.9 ACUTE ON CHRONIC KIDNEY FAILURE: ICD-10-CM

## 2020-11-02 DIAGNOSIS — C64.2 MALIGNANT NEOPLASM OF LEFT KIDNEY: ICD-10-CM

## 2020-11-02 DIAGNOSIS — Z90.5 S/P NEPHRECTOMY: ICD-10-CM

## 2020-11-02 DIAGNOSIS — C64.9 METASTATIC RENAL CELL CARCINOMA TO INTRA-ABDOMINAL SITE: ICD-10-CM

## 2020-11-02 DIAGNOSIS — Z99.2 END STAGE RENAL FAILURE ON DIALYSIS: ICD-10-CM

## 2020-11-02 LAB
ALBUMIN SERPL BCP-MCNC: 3.1 G/DL (ref 3.5–5.2)
ALP SERPL-CCNC: 214 U/L (ref 55–135)
ALT SERPL W/O P-5'-P-CCNC: 27 U/L (ref 10–44)
ANION GAP SERPL CALC-SCNC: 15 MMOL/L (ref 8–16)
AST SERPL-CCNC: 43 U/L (ref 10–40)
BASOPHILS # BLD AUTO: 0.05 K/UL (ref 0–0.2)
BASOPHILS NFR BLD: 0.9 % (ref 0–1.9)
BILIRUB SERPL-MCNC: 0.9 MG/DL (ref 0.1–1)
BUN SERPL-MCNC: 62 MG/DL (ref 6–20)
CALCIUM SERPL-MCNC: 9 MG/DL (ref 8.7–10.5)
CHLORIDE SERPL-SCNC: 103 MMOL/L (ref 95–110)
CO2 SERPL-SCNC: 25 MMOL/L (ref 23–29)
CREAT SERPL-MCNC: 8.7 MG/DL (ref 0.5–1.4)
DIFFERENTIAL METHOD: ABNORMAL
EOSINOPHIL # BLD AUTO: 0.2 K/UL (ref 0–0.5)
EOSINOPHIL NFR BLD: 4.2 % (ref 0–8)
ERYTHROCYTE [DISTWIDTH] IN BLOOD BY AUTOMATED COUNT: 15.1 % (ref 11.5–14.5)
EST. GFR  (AFRICAN AMERICAN): 5.3 ML/MIN/1.73 M^2
EST. GFR  (NON AFRICAN AMERICAN): 4.6 ML/MIN/1.73 M^2
GLUCOSE SERPL-MCNC: 118 MG/DL (ref 70–110)
HCT VFR BLD AUTO: 33.3 % (ref 37–48.5)
HGB BLD-MCNC: 10.3 G/DL (ref 12–16)
IMM GRANULOCYTES # BLD AUTO: 0.02 K/UL (ref 0–0.04)
IMM GRANULOCYTES NFR BLD AUTO: 0.4 % (ref 0–0.5)
LYMPHOCYTES # BLD AUTO: 0.9 K/UL (ref 1–4.8)
LYMPHOCYTES NFR BLD: 16.7 % (ref 18–48)
MCH RBC QN AUTO: 34.2 PG (ref 27–31)
MCHC RBC AUTO-ENTMCNC: 30.9 G/DL (ref 32–36)
MCV RBC AUTO: 111 FL (ref 82–98)
MONOCYTES # BLD AUTO: 0.5 K/UL (ref 0.3–1)
MONOCYTES NFR BLD: 8.8 % (ref 4–15)
NEUTROPHILS # BLD AUTO: 3.8 K/UL (ref 1.8–7.7)
NEUTROPHILS NFR BLD: 69 % (ref 38–73)
NRBC BLD-RTO: 0 /100 WBC
PLATELET # BLD AUTO: 143 K/UL (ref 150–350)
PMV BLD AUTO: 10.3 FL (ref 9.2–12.9)
POTASSIUM SERPL-SCNC: 3.7 MMOL/L (ref 3.5–5.1)
PROT SERPL-MCNC: 6.3 G/DL (ref 6–8.4)
RBC # BLD AUTO: 3.01 M/UL (ref 4–5.4)
SODIUM SERPL-SCNC: 143 MMOL/L (ref 136–145)
WBC # BLD AUTO: 5.45 K/UL (ref 3.9–12.7)

## 2020-11-02 PROCEDURE — 85025 COMPLETE CBC W/AUTO DIFF WBC: CPT

## 2020-11-02 PROCEDURE — 99214 PR OFFICE/OUTPT VISIT, EST, LEVL IV, 30-39 MIN: ICD-10-PCS | Mod: S$GLB,,, | Performed by: NURSE PRACTITIONER

## 2020-11-02 PROCEDURE — 80053 COMPREHEN METABOLIC PANEL: CPT

## 2020-11-02 PROCEDURE — 99999 PR PBB SHADOW E&M-EST. PATIENT-LVL V: CPT | Mod: PBBFAC,,, | Performed by: NURSE PRACTITIONER

## 2020-11-02 PROCEDURE — 36415 COLL VENOUS BLD VENIPUNCTURE: CPT

## 2020-11-02 PROCEDURE — 99214 OFFICE O/P EST MOD 30 MIN: CPT | Mod: S$GLB,,, | Performed by: NURSE PRACTITIONER

## 2020-11-02 PROCEDURE — 99999 PR PBB SHADOW E&M-EST. PATIENT-LVL V: ICD-10-PCS | Mod: PBBFAC,,, | Performed by: NURSE PRACTITIONER

## 2020-11-02 PROCEDURE — 99215 OFFICE O/P EST HI 40 MIN: CPT | Mod: PBBFAC | Performed by: NURSE PRACTITIONER

## 2020-11-06 ENCOUNTER — OFFICE VISIT (OUTPATIENT)
Dept: OPTOMETRY | Facility: CLINIC | Age: 55
End: 2020-11-06
Payer: MEDICARE

## 2020-11-06 DIAGNOSIS — H35.032 HYPERTENSIVE RETINOPATHY OF LEFT EYE: Primary | ICD-10-CM

## 2020-11-06 PROCEDURE — 92250 COLOR FUNDUS PHOTOGRAPHY - OU - BOTH EYES: ICD-10-PCS | Mod: 26,S$PBB,, | Performed by: OPTOMETRIST

## 2020-11-06 PROCEDURE — 99999 PR PBB SHADOW E&M-EST. PATIENT-LVL III: ICD-10-PCS | Mod: PBBFAC,,, | Performed by: OPTOMETRIST

## 2020-11-06 PROCEDURE — 92014 PR EYE EXAM, EST PATIENT,COMPREHESV: ICD-10-PCS | Mod: S$PBB,,, | Performed by: OPTOMETRIST

## 2020-11-06 PROCEDURE — 92014 COMPRE OPH EXAM EST PT 1/>: CPT | Mod: S$PBB,,, | Performed by: OPTOMETRIST

## 2020-11-06 PROCEDURE — 92250 FUNDUS PHOTOGRAPHY W/I&R: CPT | Mod: PBBFAC,PO | Performed by: OPTOMETRIST

## 2020-11-06 PROCEDURE — 99999 PR PBB SHADOW E&M-EST. PATIENT-LVL III: CPT | Mod: PBBFAC,,, | Performed by: OPTOMETRIST

## 2020-11-06 PROCEDURE — 99213 OFFICE O/P EST LOW 20 MIN: CPT | Mod: PBBFAC,PO,25 | Performed by: OPTOMETRIST

## 2020-11-06 NOTE — PROGRESS NOTES
Subjective:       Patient ID: Eva Bloom is a 55 y.o. female      Chief Complaint   Patient presents with    Concerns About Ocular Health    Hypertensive Eye Exam     History of Present Illness  Dls: 10/14/19 Dr. Larsen     54 y/o female presents today for hypertensive eye exam.   Pt states no changes in vision. Pt wear single vision glasses for reading.       No tearing  No itching  No burning  No pain  No ha's  No floaters  No flashes    Eye meds  None       Assessment/Plan:     1. Hypertensive retinopathy of left eye  Pt states BP well controlled per pt. CWS and heme OS. Discussed with pt. Return for repeat DFE in 3 months, sooner PRN.       - Color Fundus Photography - OU - Both Eyes    Follow up in about 3 months (around 2/6/2021), or if symptoms worsen or fail to improve, for DFE.

## 2020-11-19 ENCOUNTER — TELEPHONE (OUTPATIENT)
Dept: FAMILY MEDICINE | Facility: CLINIC | Age: 55
End: 2020-11-19

## 2020-11-19 DIAGNOSIS — Z12.11 ENCOUNTER FOR SCREENING COLONOSCOPY: Primary | ICD-10-CM

## 2020-11-19 NOTE — TELEPHONE ENCOUNTER
----- Message from Sujit Kramer sent at 11/19/2020 10:57 AM CST -----  Regarding: colonoscopy  Type: Patient Call Back    Who called:Eva     What is the request in detail: the patient is requesting a call back from the staff in regards to an order for a colonoscopy    Can the clinic reply by [facility]?no    Would the patient rather a call back or a response via [facility]? Call back     Best call back number:[phone]

## 2020-12-09 ENCOUNTER — SPECIALTY PHARMACY (OUTPATIENT)
Dept: PHARMACY | Facility: CLINIC | Age: 55
End: 2020-12-09

## 2020-12-11 ENCOUNTER — TELEPHONE (OUTPATIENT)
Dept: HEMATOLOGY/ONCOLOGY | Facility: CLINIC | Age: 55
End: 2020-12-11

## 2020-12-11 DIAGNOSIS — C64.9 METASTATIC RENAL CELL CARCINOMA, UNSPECIFIED LATERALITY: ICD-10-CM

## 2020-12-11 NOTE — TELEPHONE ENCOUNTER
----- Message from Barbara Russell PharmD sent at 12/11/2020  8:57 AM CST -----  Regarding: RE: Received Cabozantinib Prescription  Good morning all,     Any updates on this one?  ----- Message -----  From: Flores Mccormack RN  Sent: 12/9/2020   4:48 PM CST  To: Ryan Butler RN, Barbara Russell, PharmD  Subject: FW: Received Cabozantinib Prescription           Let me speak with ryan---it may have just been mistakenly sent to the incorrect pharmacy.  ~Flores  ----- Message -----  From: Barbara Russell PharmD  Sent: 12/9/2020   4:29 PM CST  To: Huber KEN Staff, Miguel Contreras Staff  Subject: Received Cabozantinib Prescription               Good afternoon Dr. Rivera and Staff,    [facility] has received a prescription for cabozantinib. It seems Mrs. Bloom has been receiving cabozantinib at [facility]. Did she have a change in insurance that allows her to fill with us instead now or was this prescription sent to [facility] so we may check on her prior authorization and benefits?        Regards,  Barbara Russell, Pharm.D., BCPS, BCOP  Clinical Pharmacist   [facility]

## 2020-12-13 NOTE — PATIENT INSTRUCTIONS
Prior to using your spray inhaler, make sure that you are either standing or sitting with your feet flat on the ground.  Take a deep breath in and out to get ready to take your inhalation.  You can place 1 or 2 sprays into your spacer at a time.  Take a slow deep breath in to make sure that the medication is making it into your lungs instead of on the back of your throat.  For spacers with the whistle, if you make the spacer honk, then you are breathing too fast and need to slow down.  If you are running out of air or need to cough before finishing the inhalation, then you may take your mouth off of it, hold your breath as long as you can, then exhale or cough.  Afterwards, you may finish the treatment in the spacer.     Asthma  not on any nebulizer.  GERD (gastroesophageal reflux disease)    Hypertension    Hypothyroidism    Lung cancer  left upper lobe - received  chemo and radiation  Other nonspecific abnormal finding of lung field    Paralysis of vocal cords

## 2020-12-18 DIAGNOSIS — N18.6 ESRD (END STAGE RENAL DISEASE): Primary | ICD-10-CM

## 2020-12-20 RX ORDER — CLONIDINE 0.2 MG/24H
1 PATCH, EXTENDED RELEASE TRANSDERMAL
Qty: 12 PATCH | Refills: 3 | Status: SHIPPED | OUTPATIENT
Start: 2020-12-20 | End: 2021-06-23 | Stop reason: DRUGHIGH

## 2020-12-28 ENCOUNTER — HOSPITAL ENCOUNTER (OUTPATIENT)
Dept: RADIOLOGY | Facility: HOSPITAL | Age: 55
Discharge: HOME | End: 2020-12-28
Attending: NURSE PRACTITIONER
Payer: COMMERCIAL

## 2020-12-28 ENCOUNTER — HOSPITAL ENCOUNTER (OUTPATIENT)
Dept: RADIOLOGY | Facility: HOSPITAL | Age: 55
Discharge: HOME | End: 2020-12-28
Attending: NURSE PRACTITIONER
Payer: MEDICARE

## 2020-12-28 DIAGNOSIS — C64.9 METASTATIC RENAL CELL CARCINOMA TO INTRA-ABDOMINAL SITE: ICD-10-CM

## 2020-12-28 DIAGNOSIS — C64.2 METASTATIC RENAL CELL CARCINOMA, LEFT: ICD-10-CM

## 2020-12-28 DIAGNOSIS — C79.89 METASTATIC RENAL CELL CARCINOMA TO INTRA-ABDOMINAL SITE: ICD-10-CM

## 2020-12-28 PROCEDURE — 71250 CT THORAX DX C-: CPT | Mod: 26,,, | Performed by: RADIOLOGY

## 2020-12-28 PROCEDURE — 74181 MRI ABDOMEN W/O CONTRAST: CPT | Mod: TC

## 2020-12-28 PROCEDURE — 71250 CT THORAX DX C-: CPT | Mod: TC

## 2020-12-28 PROCEDURE — 71250 CT CHEST WITHOUT CONTRAST: ICD-10-PCS | Mod: 26,,, | Performed by: RADIOLOGY

## 2020-12-28 PROCEDURE — 74181 MRI ABDOMEN WITHOUT CONTRAST: ICD-10-PCS | Mod: 26,,, | Performed by: RADIOLOGY

## 2020-12-28 PROCEDURE — 74181 MRI ABDOMEN W/O CONTRAST: CPT | Mod: 26,,, | Performed by: RADIOLOGY

## 2020-12-29 NOTE — PROGRESS NOTES
"Subjective:       Patient ID: Eva Bloom is a 55 y.o. female.    Chief Complaint: RCC    HPI     55 y.o.female, patient  to clinic for 8 week follow up and to review labs and scans for recurrent metastatic renal cell carcinoma. Pt is currently taking cabozantinib daily since December 2016. Pt is on ESRD with HD every TTS. Patient tolerating treat extremely well with little to no side effects.    Today, she denies any mouth sores, nausea, vomiting, diarrhea, constipation,weight loss, chest pain, leg swelling, headache, dizziness, or mood changes.    ECOG 1. She is accompanied alone.    Oncologic History (From Chart and Patient):  Eva Bloom is a 55 y.o.female with ESRD on HD who was found to have two L renal masses. She underwent L lap nephrectomy on 1/12/16. 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 Margins were negative.  Shehealed from surgery well, but then developed a large ovarian mass.  This was removed by gyn along with multiple sites of metastatic disease in the pelvis.  Path was c/w recurrence of RCC.     11/20/16 Pelvic ultrasound reveals "Large heterogeneous cystic and solid mass which appears to arise from the right ovary with worrisome imaging features for malignancy.  Differential diagnosis includes malignant tumors such as serous or mucinous cystadenocarcinoma.  It is important to note that the patient is at risk for ovarian torsion due to the size of the mass.Multiple uterine fibroids are identified with the largest in the uterine fundus."    11/22/16 pathology reveals "FINAL PATHOLOGIC DIAGNOSIS-RIGHT OVARY AND FALLOPIAN TUBE, RIGHT SALPINGO-OOPHORECTOMY:  -Positive for malignancy, high grade carcinoma morphologically and immunohistochemically consistent with metastasis from the patient's known renal cell carcinoma"    1/23/17 MRI abdomen reveals "Status post left " "nephrectomy, the left nephrectomy bed appears normal.There is a small liver lesion and a small splenic lesion, they cannot be fully characterized but they are unchanged from 5/27/16 suggestive of benign lesions.Multiple right kidney cysts.Cholelithiasis.Soft tissue oval density noted within the mesentery adjacent to the aorta right iliac bifurcation, nonspecific, could represent an enlarged lymph node, it is unchanged from prior studies"    1/23/17- CT Chest reveals "Stable pulmonary nodules, likely benign. No new pulmonary parenchymal abnormalities are identified. Enlarging nodular foci in partially visualized left upper abdomen. Peritoneal implants not excluded. Multiple thyroid nodules, measuring up to 1.6 cm."    4/5/17-MRI abdomen reveals "Again seen are the T2 hyperintense hepatic and splenic lesions that appear similar to prior exam and possibly represents hemangiomas. There are right renal cysts and the patient is status post left nephrectomy."    7/3/17- MRI abdomen "Left nephrectomy.  Right renal cysts with no malignant features.Liver and splenic lesions consistent with cysts versus hemangiomas.Cholelithiasis.Right pleural effusion and right basal atelectasis.Small pericardial effusion.Body wall edema and mesenteric edema."  7/3/17- CT chest "In this patient with a history of left renal cell carcinoma, there are 2 right lung nodules which are stable compared to January 2017.  2 left lung nodules seen on that study are not well-seen today, may be related to a small amount of pleural fluid.Bilateral pleural effusions, mildly decreased compared to prior study.Small amount of pericardial fluid, new."    Review of Systems   Constitutional: Negative for activity change, appetite change, chills, fatigue and fever.   HENT: Negative for congestion, hearing loss, mouth sores, postnasal drip, sore throat, tinnitus and voice change.    Eyes: Negative for pain and visual disturbance.   Respiratory: Negative for cough, " shortness of breath and wheezing.    Cardiovascular: Negative for chest pain, palpitations and leg swelling.   Gastrointestinal: Negative for abdominal pain, constipation, diarrhea, nausea and vomiting.   Endocrine: Negative for cold intolerance and heat intolerance.   Genitourinary: Negative for difficulty urinating, dyspareunia, dysuria, frequency, menstrual problem, urgency, vaginal bleeding, vaginal discharge and vaginal pain.   Musculoskeletal: Negative for arthralgias and myalgias.   Skin: Negative for color change, rash and wound.   Allergic/Immunologic: Negative for environmental allergies and food allergies.   Neurological: Negative for weakness, numbness and headaches.   Hematological: Negative for adenopathy. Does not bruise/bleed easily.   Psychiatric/Behavioral: Negative for agitation, confusion, hallucinations and sleep disturbance. The patient is not nervous/anxious.    All other systems reviewed and are negative.      Allergies:  Review of patient's allergies indicates:   Allergen Reactions    Allopurinol      Other reaction(s): abnormal transaminases       Medications:  Current Outpatient Medications   Medication Sig Dispense Refill    acetaminophen (TYLENOL) 500 MG tablet Take 500 mg by mouth every 6 (six) hours as needed for Pain.      albuterol (PROVENTIL/VENTOLIN HFA) 90 mcg/actuation inhaler Inhale 1-2 puffs into the lungs every 4 to 6 hours as needed for Wheezing or Shortness of Breath (chest tightness). 1 Inhaler 6    aluminum-magnesium hydroxide-simethicone (MAALOX) 200-200-20 mg/5 mL Susp Take 30 mLs by mouth every 6 (six) hours as needed.       amLODIPine (NORVASC) 5 MG tablet TAKE 1 TABLET BY MOUTH EVERY DAY 90 tablet 3    benzonatate (TESSALON) 200 MG capsule Take 1 capsule (200 mg total) by mouth 3 (three) times daily as needed for Cough. 30 capsule 1    cabozantinib (CABOMETYX) 60 mg Tab Take 60 mg by mouth once daily. 30 tablet 12    cloNIDine 0.2 mg/24 hr td ptwk (CATAPRES)  0.2 mg/24 hr Place 1 patch onto the skin every 7 days. 12 patch 3    febuxostat (ULORIC) 40 mg Tab Take 1 tablet (40 mg total) by mouth once daily. 30 tablet 4    hydrALAZINE (APRESOLINE) 100 MG tablet TAKE 1 TABLET BY MOUTH EVERY 12 HOURS 180 tablet 3    inhalation spacing device Use as directed for inhalation. 1 Device 0    isosorbide dinitrate (ISORDIL) 30 MG Tab Take 1 tablet (30 mg total) by mouth 2 (two) times daily. 60 tablet 11    lidocaine-prilocaine (EMLA) cream APPLY ATLEAST 30 MINUTES BEFORE TREATMENT 3 TIMES A WEEK 30 g 11    metoprolol succinate (TOPROL-XL) 100 MG 24 hr tablet Take 1 tablet (100 mg total) by mouth once daily. 90 tablet 3    multivitamin-Ca-iron-minerals (ONE-A-DAY WOMENS FORMULA) 27-0.4 mg Tab Take by mouth. 1 Tablet Oral Every day      olmesartan (BENICAR) 40 MG tablet TAKE 1 TABLET BY MOUTH EVERY DAY 90 tablet 3     No current facility-administered medications for this visit.        PMH:  Past Medical History:   Diagnosis Date    Anemia     Bronchitis 03/01/2017    Cancer 2016    kidney cancer    CMV (cytomegalovirus) antibody positive     Essential hypertension 9/23/2015    H/O herpes simplex type 2 infection     Herpes simplex type 1 antibody positive     History of kidney cancer     s/p left nephrectomy 1/2016    Hyperparathyroidism, unspecified     Hyperuricemia without signs of inflammatory arthritis and tophaceous disease     Kidney stones     LGSIL (low grade squamous intraepithelial dysplasia)     Myocardiopathy 7/21/2017    Prediabetes     Proteinuria     Renal disorder     Thyroid nodule     Urate nephropathy        PSH:  Past Surgical History:   Procedure Laterality Date    BREAST CYST EXCISION      COLONOSCOPY N/A 11/12/2015    NEPHRECTOMY-LAPAROSCOPIC Left 01/12/2016    PERITONEAL CATHETER INSERTION      Permacath insertion  01/12/2017    SALPINGOOPHORECTOMY Right 2016    K[facility]---DAVINCI    TONSILLECTOMY      TUBAL LIGATION          FamHx:  Family History   Problem Relation Age of Onset    Hypertension Mother     Diabetes Father     Kidney disease Father     No Known Problems Son     No Known Problems Son     Diabetes Maternal Grandfather     No Known Problems Sister     No Known Problems Brother     No Known Problems Maternal Grandmother     No Known Problems Paternal Grandmother     No Known Problems Paternal Grandfather     Heart disease Sister     No Known Problems Sister     No Known Problems Brother     No Known Problems Maternal Aunt     No Known Problems Maternal Uncle     No Known Problems Paternal Aunt     No Known Problems Paternal Uncle     Breast cancer Neg Hx     Colon cancer Neg Hx     Cancer Neg Hx     Stroke Neg Hx     Amblyopia Neg Hx     Blindness Neg Hx     Cataracts Neg Hx     Glaucoma Neg Hx     Macular degeneration Neg Hx     Retinal detachment Neg Hx     Strabismus Neg Hx     Thyroid disease Neg Hx        SocHx:  Social History     Socioeconomic History    Marital status:      Spouse name: Not on file    Number of children: 2    Years of education: Not on file    Highest education level: Not on file   Occupational History    Occupation: [employer]     Employer: [id number]   Social Needs    Financial resource strain: Not on file    Food insecurity     Worry: Not on file     Inability: Not on file    Transportation needs     Medical: Not on file     Non-medical: Not on file   Tobacco Use    Smoking status: Never Smoker    Smokeless tobacco: Never Used   Substance and Sexual Activity    Alcohol use: No     Comment: . 2 children. works at [employer].    Drug use: No    Sexual activity: Yes     Partners: Male   Lifestyle    Physical activity     Days per week: Not on file     Minutes per session: Not on file    Stress: Not on file   Relationships    Social connections     Talks on phone: Not on file     Gets together: Not on file     Attends Anabaptist  service: Not on file     Active member of club or organization: Not on file     Attends meetings of clubs or organizations: Not on file     Relationship status: Not on file   Other Topics Concern    Not on file   Social History Narrative    Not on file         Objective:     LABS:  WBC   Date Value Ref Range Status   11/02/2020 5.45 3.90 - 12.70 K/uL Final     Hemoglobin   Date Value Ref Range Status   11/02/2020 10.3 (L) 12.0 - 16.0 g/dL Final     Hematocrit   Date Value Ref Range Status   11/02/2020 33.3 (L) 37.0 - 48.5 % Final     Platelets   Date Value Ref Range Status   11/02/2020 143 (L) 150 - 350 K/uL Final       Chemistry        Component Value Date/Time     11/02/2020 0800    K 3.7 11/02/2020 0800     11/02/2020 0800    CO2 25 11/02/2020 0800    BUN 62 (H) 11/02/2020 0800    CREATININE 8.7 (H) 11/02/2020 0800     (H) 11/02/2020 0800        Component Value Date/Time    CALCIUM 9.0 11/02/2020 0800    ALKPHOS 214 (H) 11/02/2020 0800    AST 43 (H) 11/02/2020 0800    ALT 27 11/02/2020 0800    BILITOT 0.9 11/02/2020 0800            Assessment:       1. Metastatic renal cell carcinoma, unspecified laterality    2. Metastatic renal cell carcinoma, left    3. Metastatic renal cell carcinoma to intra-abdominal site          Plan:       1.  Metastatic Renal Cell Carcinoma:    - It is unlikely that surgery removed all sites of metastatic disease. Given that this is c/w her previous papillary disease, pt started on oral cabozantinib 60mg daily (for dual VEGF and MET inhibition), especially given recent data that shows cabo is far superior to student in the first line setting.  If pt progresses on this regimen, will switch to nivolumab.     Restaging scans show stable disease and pt tolerating cabozantinib very well. We will continue cabozantinib for now. Patient has minimal side effects from medication. We will plan for scans every 4 months.    Scans stable.      RTC in approx 8 weeks with labs  (CBC,CMP) and to see Liset.     Patient is in agreement with the proposed treatment plan. All questions were answered to the patient's satisfaction. Pt knows to call clinic if anything is needed before the next clinic visit.     I, Dr. Ben Rivera, personally spent more than 25 mins during this encounter, greater than 50% was spent in direct counseling and/or coordination of care.     Ben Rivera M.D., M.S., F.A.C.P.  Hematology/Oncology Attending  [facility]

## 2020-12-30 ENCOUNTER — OFFICE VISIT (OUTPATIENT)
Dept: HEMATOLOGY/ONCOLOGY | Facility: CLINIC | Age: 55
End: 2020-12-30
Payer: COMMERCIAL

## 2020-12-30 VITALS
HEART RATE: 71 BPM | HEIGHT: 64 IN | SYSTOLIC BLOOD PRESSURE: 142 MMHG | BODY MASS INDEX: 21.79 KG/M2 | RESPIRATION RATE: 18 BRPM | TEMPERATURE: 98 F | DIASTOLIC BLOOD PRESSURE: 77 MMHG | OXYGEN SATURATION: 95 % | WEIGHT: 127.63 LBS

## 2020-12-30 DIAGNOSIS — C64.2 METASTATIC RENAL CELL CARCINOMA, LEFT: ICD-10-CM

## 2020-12-30 DIAGNOSIS — C64.9 METASTATIC RENAL CELL CARCINOMA TO INTRA-ABDOMINAL SITE: ICD-10-CM

## 2020-12-30 DIAGNOSIS — C64.9 METASTATIC RENAL CELL CARCINOMA, UNSPECIFIED LATERALITY: Primary | ICD-10-CM

## 2020-12-30 DIAGNOSIS — C79.89 METASTATIC RENAL CELL CARCINOMA TO INTRA-ABDOMINAL SITE: ICD-10-CM

## 2020-12-30 PROCEDURE — 99214 OFFICE O/P EST MOD 30 MIN: CPT | Mod: S$GLB,,, | Performed by: INTERNAL MEDICINE

## 2020-12-30 PROCEDURE — 99214 OFFICE O/P EST MOD 30 MIN: CPT | Mod: PBBFAC | Performed by: INTERNAL MEDICINE

## 2020-12-30 PROCEDURE — 99999 PR PBB SHADOW E&M-EST. PATIENT-LVL IV: CPT | Mod: PBBFAC,,, | Performed by: INTERNAL MEDICINE

## 2020-12-30 PROCEDURE — 99214 PR OFFICE/OUTPT VISIT, EST, LEVL IV, 30-39 MIN: ICD-10-PCS | Mod: S$GLB,,, | Performed by: INTERNAL MEDICINE

## 2020-12-30 PROCEDURE — 99999 PR PBB SHADOW E&M-EST. PATIENT-LVL IV: ICD-10-PCS | Mod: PBBFAC,,, | Performed by: INTERNAL MEDICINE

## 2021-01-05 DIAGNOSIS — C64.9 METASTATIC RENAL CELL CARCINOMA, UNSPECIFIED LATERALITY: ICD-10-CM

## 2021-01-11 ENCOUNTER — TELEPHONE (OUTPATIENT)
Dept: FAMILY MEDICINE | Facility: CLINIC | Age: 56
End: 2021-01-11

## 2021-01-11 DIAGNOSIS — Z12.11 COLON CANCER SCREENING: Primary | ICD-10-CM

## 2021-01-22 ENCOUNTER — TELEPHONE (OUTPATIENT)
Dept: ENDOSCOPY | Facility: HOSPITAL | Age: 56
End: 2021-01-22

## 2021-01-26 ENCOUNTER — PATIENT OUTREACH (OUTPATIENT)
Dept: ADMINISTRATIVE | Facility: OTHER | Age: 56
End: 2021-01-26

## 2021-01-27 ENCOUNTER — TELEPHONE (OUTPATIENT)
Dept: CARDIOLOGY | Facility: CLINIC | Age: 56
End: 2021-01-27

## 2021-01-27 ENCOUNTER — OFFICE VISIT (OUTPATIENT)
Dept: CARDIOLOGY | Facility: CLINIC | Age: 56
End: 2021-01-27
Payer: COMMERCIAL

## 2021-01-27 VITALS
DIASTOLIC BLOOD PRESSURE: 60 MMHG | HEART RATE: 75 BPM | SYSTOLIC BLOOD PRESSURE: 144 MMHG | OXYGEN SATURATION: 94 % | BODY MASS INDEX: 21.22 KG/M2 | HEIGHT: 64 IN | WEIGHT: 124.31 LBS

## 2021-01-27 DIAGNOSIS — Z01.810 PREOP CARDIOVASCULAR EXAM: ICD-10-CM

## 2021-01-27 DIAGNOSIS — I42.8 CARDIOMYOPATHY, NONISCHEMIC: Primary | ICD-10-CM

## 2021-01-27 DIAGNOSIS — I27.20 PULMONARY HYPERTENSION: ICD-10-CM

## 2021-01-27 DIAGNOSIS — Z99.2 ESRD (END STAGE RENAL DISEASE) ON DIALYSIS: ICD-10-CM

## 2021-01-27 DIAGNOSIS — N18.6 ESRD (END STAGE RENAL DISEASE) ON DIALYSIS: ICD-10-CM

## 2021-01-27 DIAGNOSIS — R73.9 HYPERGLYCEMIA: ICD-10-CM

## 2021-01-27 DIAGNOSIS — I34.0 NONRHEUMATIC MITRAL VALVE REGURGITATION: ICD-10-CM

## 2021-01-27 DIAGNOSIS — I51.89 COMBINED SYSTOLIC AND DIASTOLIC CARDIAC DYSFUNCTION: ICD-10-CM

## 2021-01-27 DIAGNOSIS — I36.1 NONRHEUMATIC TRICUSPID VALVE REGURGITATION: ICD-10-CM

## 2021-01-27 DIAGNOSIS — I10 ESSENTIAL HYPERTENSION: ICD-10-CM

## 2021-01-27 PROCEDURE — 99215 OFFICE O/P EST HI 40 MIN: CPT | Mod: PBBFAC | Performed by: NURSE PRACTITIONER

## 2021-01-27 PROCEDURE — 99214 OFFICE O/P EST MOD 30 MIN: CPT | Mod: S$GLB,,, | Performed by: NURSE PRACTITIONER

## 2021-01-27 PROCEDURE — 99999 PR PBB SHADOW E&M-EST. PATIENT-LVL V: ICD-10-PCS | Mod: PBBFAC,,, | Performed by: NURSE PRACTITIONER

## 2021-01-27 PROCEDURE — 99214 PR OFFICE/OUTPT VISIT, EST, LEVL IV, 30-39 MIN: ICD-10-PCS | Mod: S$GLB,,, | Performed by: NURSE PRACTITIONER

## 2021-01-27 PROCEDURE — 99999 PR PBB SHADOW E&M-EST. PATIENT-LVL V: CPT | Mod: PBBFAC,,, | Performed by: NURSE PRACTITIONER

## 2021-02-05 ENCOUNTER — HOSPITAL ENCOUNTER (OUTPATIENT)
Dept: CARDIOLOGY | Facility: HOSPITAL | Age: 56
Discharge: HOME | End: 2021-02-05
Attending: NURSE PRACTITIONER
Payer: MEDICARE

## 2021-02-05 DIAGNOSIS — I27.20 PULMONARY HYPERTENSION: ICD-10-CM

## 2021-02-05 DIAGNOSIS — I42.8 CARDIOMYOPATHY, NONISCHEMIC: ICD-10-CM

## 2021-02-05 PROBLEM — I36.1 NONRHEUMATIC TRICUSPID VALVE REGURGITATION: Status: ACTIVE | Noted: 2021-02-05

## 2021-02-05 LAB
ASCENDING AORTA: 3.39 CM
AV INDEX (PROSTH): 0.67
AV MEAN GRADIENT: 6 MMHG
AV PEAK GRADIENT: 10 MMHG
AV VALVE AREA: 1.89 CM2
AV VELOCITY RATIO: 0.68
CV ECHO LV RWT: 0.54 CM
DOP CALC AO PEAK VEL: 1.62 M/S
DOP CALC AO VTI: 31.66 CM
DOP CALC LVOT AREA: 2.8 CM2
DOP CALC LVOT DIAMETER: 1.89 CM
DOP CALC LVOT PEAK VEL: 1.1 M/S
DOP CALC LVOT STROKE VOLUME: 59.78 CM3
DOP CALCLVOT PEAK VEL VTI: 21.32 CM
E WAVE DECELERATION TIME: 197.16 MSEC
E/A RATIO: 1.2
E/E' RATIO: 21.45 M/S
ECHO LV POSTERIOR WALL: 1.38 CM (ref 0.6–1.1)
FRACTIONAL SHORTENING: 14 % (ref 28–44)
INTERVENTRICULAR SEPTUM: 1.35 CM (ref 0.6–1.1)
IVRT: 95.15 MSEC
LA MAJOR: 6.14 CM
LA MINOR: 5.32 CM
LA WIDTH: 4.77 CM
LEFT ATRIUM SIZE: 4.55 CM
LEFT ATRIUM VOLUME: 105.17 CM3
LEFT INTERNAL DIMENSION IN SYSTOLE: 4.38 CM (ref 2.1–4)
LEFT VENTRICLE DIASTOLIC VOLUME: 124.85 ML
LEFT VENTRICLE SYSTOLIC VOLUME: 86.91 ML
LEFT VENTRICULAR INTERNAL DIMENSION IN DIASTOLE: 5.12 CM (ref 3.5–6)
LEFT VENTRICULAR MASS: 291.4 G
LV LATERAL E/E' RATIO: 19.67 M/S
LV SEPTAL E/E' RATIO: 23.6 M/S
MV PEAK A VEL: 0.98 M/S
MV PEAK E VEL: 1.18 M/S
PISA TR MAX VEL: 4.01 M/S
PULM VEIN S/D RATIO: 0.74
PV PEAK D VEL: 0.47 M/S
PV PEAK S VEL: 0.35 M/S
PV PEAK VELOCITY: 1.09 CM/S
RA MAJOR: 5.99 CM
RA PRESSURE: 8 MMHG
RA WIDTH: 3.92 CM
RIGHT VENTRICULAR END-DIASTOLIC DIMENSION: 4.07 CM
RV TISSUE DOPPLER FREE WALL SYSTOLIC VELOCITY 1 (APICAL 4 CHAMBER VIEW): 13.22 CM/S
STJ: 2.77 CM
TDI LATERAL: 0.06 M/S
TDI SEPTAL: 0.05 M/S
TDI: 0.06 M/S
TR MAX PG: 64 MMHG
TRICUSPID ANNULAR PLANE SYSTOLIC EXCURSION: 2.28 CM
TV REST PULMONARY ARTERY PRESSURE: 72 MMHG

## 2021-02-05 PROCEDURE — 93306 TTE W/DOPPLER COMPLETE: CPT | Mod: 26,,, | Performed by: INTERNAL MEDICINE

## 2021-02-05 PROCEDURE — 93306 ECHO (CUPID ONLY): ICD-10-PCS | Mod: 26,,, | Performed by: INTERNAL MEDICINE

## 2021-02-05 PROCEDURE — 93306 TTE W/DOPPLER COMPLETE: CPT

## 2021-02-08 ENCOUNTER — OFFICE VISIT (OUTPATIENT)
Dept: OPTOMETRY | Facility: CLINIC | Age: 56
End: 2021-02-08
Payer: COMMERCIAL

## 2021-02-08 DIAGNOSIS — H35.032 HYPERTENSIVE RETINOPATHY OF LEFT EYE: Primary | ICD-10-CM

## 2021-02-08 PROCEDURE — 99999 PR PBB SHADOW E&M-EST. PATIENT-LVL II: CPT | Mod: PBBFAC,,, | Performed by: OPTOMETRIST

## 2021-02-08 PROCEDURE — 99212 OFFICE O/P EST SF 10 MIN: CPT | Mod: PBBFAC,PO | Performed by: OPTOMETRIST

## 2021-02-08 PROCEDURE — 92014 PR EYE EXAM, EST PATIENT,COMPREHESV: ICD-10-PCS | Mod: S$GLB,,, | Performed by: OPTOMETRIST

## 2021-02-08 PROCEDURE — 99999 PR PBB SHADOW E&M-EST. PATIENT-LVL II: ICD-10-PCS | Mod: PBBFAC,,, | Performed by: OPTOMETRIST

## 2021-02-08 PROCEDURE — 92014 COMPRE OPH EXAM EST PT 1/>: CPT | Mod: S$GLB,,, | Performed by: OPTOMETRIST

## 2021-02-12 ENCOUNTER — TELEPHONE (OUTPATIENT)
Dept: VASCULAR SURGERY | Facility: CLINIC | Age: 56
End: 2021-02-12

## 2021-02-12 DIAGNOSIS — Z99.2 DIALYSIS PATIENT: Primary | ICD-10-CM

## 2021-02-22 ENCOUNTER — TELEPHONE (OUTPATIENT)
Dept: HEMATOLOGY/ONCOLOGY | Facility: CLINIC | Age: 56
End: 2021-02-22

## 2021-02-22 DIAGNOSIS — N25.81 HYPERPARATHYROIDISM, SECONDARY RENAL: ICD-10-CM

## 2021-02-22 DIAGNOSIS — C64.9 METASTATIC RENAL CELL CARCINOMA TO INTRA-ABDOMINAL SITE: Primary | ICD-10-CM

## 2021-02-22 DIAGNOSIS — R79.9 ABNORMAL FINDING OF BLOOD CHEMISTRY, UNSPECIFIED: ICD-10-CM

## 2021-02-22 DIAGNOSIS — N18.6 ESRD (END STAGE RENAL DISEASE) ON DIALYSIS: ICD-10-CM

## 2021-02-22 DIAGNOSIS — C79.89 METASTATIC RENAL CELL CARCINOMA TO INTRA-ABDOMINAL SITE: Primary | ICD-10-CM

## 2021-02-22 DIAGNOSIS — Z99.2 ESRD (END STAGE RENAL DISEASE) ON DIALYSIS: ICD-10-CM

## 2021-02-26 ENCOUNTER — LAB VISIT (OUTPATIENT)
Dept: LAB | Facility: HOSPITAL | Age: 56
End: 2021-02-26
Payer: MEDICARE

## 2021-02-26 ENCOUNTER — TELEPHONE (OUTPATIENT)
Dept: CARDIOLOGY | Facility: CLINIC | Age: 56
End: 2021-02-26

## 2021-02-26 ENCOUNTER — OFFICE VISIT (OUTPATIENT)
Dept: HEMATOLOGY/ONCOLOGY | Facility: CLINIC | Age: 56
End: 2021-02-26
Payer: MEDICARE

## 2021-02-26 VITALS
HEIGHT: 64 IN | DIASTOLIC BLOOD PRESSURE: 74 MMHG | WEIGHT: 123.88 LBS | HEART RATE: 71 BPM | OXYGEN SATURATION: 92 % | RESPIRATION RATE: 14 BRPM | SYSTOLIC BLOOD PRESSURE: 154 MMHG | BODY MASS INDEX: 21.15 KG/M2 | TEMPERATURE: 98 F

## 2021-02-26 DIAGNOSIS — N25.81 HYPERPARATHYROIDISM, SECONDARY RENAL: ICD-10-CM

## 2021-02-26 DIAGNOSIS — Z99.2 ESRD (END STAGE RENAL DISEASE) ON DIALYSIS: ICD-10-CM

## 2021-02-26 DIAGNOSIS — C79.89 METASTATIC RENAL CELL CARCINOMA TO INTRA-ABDOMINAL SITE: ICD-10-CM

## 2021-02-26 DIAGNOSIS — I51.89 COMBINED SYSTOLIC AND DIASTOLIC CARDIAC DYSFUNCTION: ICD-10-CM

## 2021-02-26 DIAGNOSIS — D69.59 CHEMOTHERAPY-INDUCED THROMBOCYTOPENIA: ICD-10-CM

## 2021-02-26 DIAGNOSIS — Z90.5 S/P NEPHRECTOMY: ICD-10-CM

## 2021-02-26 DIAGNOSIS — N18.6 ANEMIA DUE TO END STAGE RENAL DISEASE: ICD-10-CM

## 2021-02-26 DIAGNOSIS — N18.6 ESRD (END STAGE RENAL DISEASE) ON DIALYSIS: ICD-10-CM

## 2021-02-26 DIAGNOSIS — C64.2 METASTATIC RENAL CELL CARCINOMA, LEFT: Primary | ICD-10-CM

## 2021-02-26 DIAGNOSIS — D63.1 ANEMIA DUE TO END STAGE RENAL DISEASE: ICD-10-CM

## 2021-02-26 DIAGNOSIS — C64.9 METASTATIC RENAL CELL CARCINOMA TO INTRA-ABDOMINAL SITE: ICD-10-CM

## 2021-02-26 DIAGNOSIS — I12.9 HYPERTENSION, RENAL DISEASE: ICD-10-CM

## 2021-02-26 DIAGNOSIS — I42.8 CARDIOMYOPATHY, NONISCHEMIC: Primary | ICD-10-CM

## 2021-02-26 DIAGNOSIS — R79.9 ABNORMAL FINDING OF BLOOD CHEMISTRY, UNSPECIFIED: ICD-10-CM

## 2021-02-26 DIAGNOSIS — I42.8 CARDIOMYOPATHY, NONISCHEMIC: ICD-10-CM

## 2021-02-26 DIAGNOSIS — C64.2 METASTATIC RENAL CELL CARCINOMA, LEFT: ICD-10-CM

## 2021-02-26 DIAGNOSIS — T45.1X5A CHEMOTHERAPY-INDUCED THROMBOCYTOPENIA: ICD-10-CM

## 2021-02-26 LAB
ALBUMIN SERPL BCP-MCNC: 2.8 G/DL (ref 3.5–5.2)
ALP SERPL-CCNC: 209 U/L (ref 55–135)
ALT SERPL W/O P-5'-P-CCNC: 30 U/L (ref 10–44)
ANION GAP SERPL CALC-SCNC: 13 MMOL/L (ref 8–16)
AST SERPL-CCNC: 43 U/L (ref 10–40)
BILIRUB SERPL-MCNC: 0.8 MG/DL (ref 0.1–1)
BUN SERPL-MCNC: 45 MG/DL (ref 6–20)
CALCIUM SERPL-MCNC: 7.3 MG/DL (ref 8.7–10.5)
CHLORIDE SERPL-SCNC: 104 MMOL/L (ref 95–110)
CHOLEST SERPL-MCNC: 99 MG/DL (ref 120–199)
CHOLEST/HDLC SERPL: 2.9 {RATIO} (ref 2–5)
CO2 SERPL-SCNC: 24 MMOL/L (ref 23–29)
CREAT SERPL-MCNC: 7 MG/DL (ref 0.5–1.4)
ERYTHROCYTE [DISTWIDTH] IN BLOOD BY AUTOMATED COUNT: 16.2 % (ref 11.5–14.5)
EST. GFR  (AFRICAN AMERICAN): 6.9 ML/MIN/1.73 M^2
EST. GFR  (NON AFRICAN AMERICAN): 6 ML/MIN/1.73 M^2
ESTIMATED AVG GLUCOSE: 103 MG/DL (ref 68–131)
GLUCOSE SERPL-MCNC: 86 MG/DL (ref 70–110)
HBA1C MFR BLD: 5.2 % (ref 4–5.6)
HCT VFR BLD AUTO: 31.9 % (ref 37–48.5)
HDLC SERPL-MCNC: 34 MG/DL (ref 40–75)
HDLC SERPL: 34.3 % (ref 20–50)
HGB BLD-MCNC: 10.4 G/DL (ref 12–16)
IMM GRANULOCYTES # BLD AUTO: 0.02 K/UL (ref 0–0.04)
LDLC SERPL CALC-MCNC: 54.4 MG/DL (ref 63–159)
MCH RBC QN AUTO: 34.8 PG (ref 27–31)
MCHC RBC AUTO-ENTMCNC: 32.6 G/DL (ref 32–36)
MCV RBC AUTO: 107 FL (ref 82–98)
NEUTROPHILS # BLD AUTO: 2.4 K/UL (ref 1.8–7.7)
NONHDLC SERPL-MCNC: 65 MG/DL
PLATELET # BLD AUTO: 125 K/UL (ref 150–350)
PMV BLD AUTO: 10.3 FL (ref 9.2–12.9)
POTASSIUM SERPL-SCNC: 3.9 MMOL/L (ref 3.5–5.1)
PROT SERPL-MCNC: 6 G/DL (ref 6–8.4)
RBC # BLD AUTO: 2.99 M/UL (ref 4–5.4)
SODIUM SERPL-SCNC: 141 MMOL/L (ref 136–145)
TRIGL SERPL-MCNC: 53 MG/DL (ref 30–150)
WBC # BLD AUTO: 4.39 K/UL (ref 3.9–12.7)

## 2021-02-26 PROCEDURE — 99999 PR PBB SHADOW E&M-EST. PATIENT-LVL IV: ICD-10-PCS | Mod: PBBFAC,,, | Performed by: NURSE PRACTITIONER

## 2021-02-26 PROCEDURE — 99214 OFFICE O/P EST MOD 30 MIN: CPT | Mod: PBBFAC | Performed by: NURSE PRACTITIONER

## 2021-02-26 PROCEDURE — 99215 PR OFFICE/OUTPT VISIT, EST, LEVL V, 40-54 MIN: ICD-10-PCS | Mod: S$PBB,,, | Performed by: NURSE PRACTITIONER

## 2021-02-26 PROCEDURE — 80061 LIPID PANEL: CPT

## 2021-02-26 PROCEDURE — 80053 COMPREHEN METABOLIC PANEL: CPT

## 2021-02-26 PROCEDURE — 99215 OFFICE O/P EST HI 40 MIN: CPT | Mod: S$PBB,,, | Performed by: NURSE PRACTITIONER

## 2021-02-26 PROCEDURE — 83036 HEMOGLOBIN GLYCOSYLATED A1C: CPT

## 2021-02-26 PROCEDURE — 85027 COMPLETE CBC AUTOMATED: CPT

## 2021-02-26 PROCEDURE — 99999 PR PBB SHADOW E&M-EST. PATIENT-LVL IV: CPT | Mod: PBBFAC,,, | Performed by: NURSE PRACTITIONER

## 2021-02-26 RX ORDER — SACUBITRIL AND VALSARTAN 49; 51 MG/1; MG/1
1 TABLET, FILM COATED ORAL 2 TIMES DAILY
Qty: 60 TABLET | Refills: 11 | Status: SHIPPED | OUTPATIENT
Start: 2021-02-26 | End: 2021-11-02

## 2021-03-01 DIAGNOSIS — I42.8 CARDIOMYOPATHY, NONISCHEMIC: Primary | ICD-10-CM

## 2021-03-03 ENCOUNTER — PATIENT OUTREACH (OUTPATIENT)
Dept: ADMINISTRATIVE | Facility: OTHER | Age: 56
End: 2021-03-03

## 2021-03-03 ENCOUNTER — TELEPHONE (OUTPATIENT)
Dept: ELECTROPHYSIOLOGY | Facility: CLINIC | Age: 56
End: 2021-03-03

## 2021-03-04 ENCOUNTER — TELEPHONE (OUTPATIENT)
Dept: ELECTROPHYSIOLOGY | Facility: CLINIC | Age: 56
End: 2021-03-04

## 2021-03-05 ENCOUNTER — HOSPITAL ENCOUNTER (OUTPATIENT)
Dept: CARDIOLOGY | Facility: CLINIC | Age: 56
Discharge: HOME | End: 2021-03-05
Payer: COMMERCIAL

## 2021-03-05 ENCOUNTER — TELEPHONE (OUTPATIENT)
Dept: ENDOSCOPY | Facility: HOSPITAL | Age: 56
End: 2021-03-05

## 2021-03-05 ENCOUNTER — OFFICE VISIT (OUTPATIENT)
Dept: ELECTROPHYSIOLOGY | Facility: CLINIC | Age: 56
End: 2021-03-05
Payer: MEDICARE

## 2021-03-05 VITALS
SYSTOLIC BLOOD PRESSURE: 144 MMHG | DIASTOLIC BLOOD PRESSURE: 84 MMHG | HEIGHT: 64 IN | BODY MASS INDEX: 21.53 KG/M2 | HEART RATE: 78 BPM | WEIGHT: 126.13 LBS

## 2021-03-05 DIAGNOSIS — I27.20 PULMONARY HYPERTENSION: Primary | ICD-10-CM

## 2021-03-05 DIAGNOSIS — I42.8 CARDIOMYOPATHY, NONISCHEMIC: ICD-10-CM

## 2021-03-05 DIAGNOSIS — I51.89 COMBINED SYSTOLIC AND DIASTOLIC CARDIAC DYSFUNCTION: ICD-10-CM

## 2021-03-05 DIAGNOSIS — E04.2 MULTINODULAR GOITER: ICD-10-CM

## 2021-03-05 DIAGNOSIS — Z99.2 ESRD (END STAGE RENAL DISEASE) ON DIALYSIS: ICD-10-CM

## 2021-03-05 DIAGNOSIS — N18.6 ESRD (END STAGE RENAL DISEASE) ON DIALYSIS: ICD-10-CM

## 2021-03-05 DIAGNOSIS — Z12.11 SPECIAL SCREENING FOR MALIGNANT NEOPLASMS, COLON: Primary | ICD-10-CM

## 2021-03-05 DIAGNOSIS — I10 ESSENTIAL HYPERTENSION: ICD-10-CM

## 2021-03-05 PROCEDURE — 99999 PR PBB SHADOW E&M-EST. PATIENT-LVL III: ICD-10-PCS | Mod: PBBFAC,,, | Performed by: INTERNAL MEDICINE

## 2021-03-05 PROCEDURE — 93005 ELECTROCARDIOGRAM TRACING: CPT | Mod: PBBFAC | Performed by: INTERNAL MEDICINE

## 2021-03-05 PROCEDURE — 99204 OFFICE O/P NEW MOD 45 MIN: CPT | Mod: S$PBB,,, | Performed by: INTERNAL MEDICINE

## 2021-03-05 PROCEDURE — 99213 OFFICE O/P EST LOW 20 MIN: CPT | Mod: PBBFAC,25 | Performed by: INTERNAL MEDICINE

## 2021-03-05 PROCEDURE — 99204 PR OFFICE/OUTPT VISIT, NEW, LEVL IV, 45-59 MIN: ICD-10-PCS | Mod: S$PBB,,, | Performed by: INTERNAL MEDICINE

## 2021-03-05 PROCEDURE — 99999 PR PBB SHADOW E&M-EST. PATIENT-LVL III: CPT | Mod: PBBFAC,,, | Performed by: INTERNAL MEDICINE

## 2021-03-05 PROCEDURE — 93010 RHYTHM STRIP: ICD-10-PCS | Mod: S$PBB,,, | Performed by: INTERNAL MEDICINE

## 2021-03-05 PROCEDURE — 93010 ELECTROCARDIOGRAM REPORT: CPT | Mod: S$PBB,,, | Performed by: INTERNAL MEDICINE

## 2021-03-05 RX ORDER — SODIUM, POTASSIUM,MAG SULFATES 17.5-3.13G
1 SOLUTION, RECONSTITUTED, ORAL ORAL DAILY
Qty: 1 KIT | Refills: 0 | Status: SHIPPED | OUTPATIENT
Start: 2021-03-05 | End: 2021-05-17

## 2021-03-08 ENCOUNTER — TELEPHONE (OUTPATIENT)
Dept: ENDOSCOPY | Facility: HOSPITAL | Age: 56
End: 2021-03-08

## 2021-03-08 DIAGNOSIS — Z12.11 SPECIAL SCREENING FOR MALIGNANT NEOPLASMS, COLON: Primary | ICD-10-CM

## 2021-03-08 RX ORDER — POLYETHYLENE GLYCOL 3350, SODIUM SULFATE ANHYDROUS, SODIUM BICARBONATE, SODIUM CHLORIDE, POTASSIUM CHLORIDE 236; 22.74; 6.74; 5.86; 2.97 G/4L; G/4L; G/4L; G/4L; G/4L
4 POWDER, FOR SOLUTION ORAL ONCE
Qty: 4000 ML | Refills: 0 | Status: SHIPPED | OUTPATIENT
Start: 2021-03-08 | End: 2021-03-08

## 2021-03-09 ENCOUNTER — LAB VISIT (OUTPATIENT)
Dept: FAMILY MEDICINE | Facility: CLINIC | Age: 56
End: 2021-03-09
Payer: MEDICARE

## 2021-03-09 DIAGNOSIS — J20.9 ACUTE BRONCHITIS, UNSPECIFIED ORGANISM: ICD-10-CM

## 2021-03-09 DIAGNOSIS — Z01.818 PRE-OP TESTING: ICD-10-CM

## 2021-03-09 PROCEDURE — U0003 INFECTIOUS AGENT DETECTION BY NUCLEIC ACID (DNA OR RNA); SEVERE ACUTE RESPIRATORY SYNDROME CORONAVIRUS 2 (SARS-COV-2) (CORONAVIRUS DISEASE [COVID-19]), AMPLIFIED PROBE TECHNIQUE, MAKING USE OF HIGH THROUGHPUT TECHNOLOGIES AS DESCRIBED BY CMS-2020-01-R: HCPCS | Performed by: STUDENT IN AN ORGANIZED HEALTH CARE EDUCATION/TRAINING PROGRAM

## 2021-03-09 PROCEDURE — U0005 INFEC AGEN DETEC AMPLI PROBE: HCPCS | Performed by: STUDENT IN AN ORGANIZED HEALTH CARE EDUCATION/TRAINING PROGRAM

## 2021-03-09 RX ORDER — ALBUTEROL SULFATE 90 UG/1
AEROSOL, METERED RESPIRATORY (INHALATION)
Qty: 18 G | Refills: 5 | Status: SHIPPED | OUTPATIENT
Start: 2021-03-09 | End: 2021-05-24

## 2021-03-10 LAB — SARS-COV-2 RNA RESP QL NAA+PROBE: NOT DETECTED

## 2021-03-12 ENCOUNTER — ANESTHESIA EVENT (OUTPATIENT)
Dept: ENDOSCOPY | Facility: HOSPITAL | Age: 56
End: 2021-03-12
Payer: MEDICARE

## 2021-03-12 ENCOUNTER — HOSPITAL ENCOUNTER (OUTPATIENT)
Facility: HOSPITAL | Age: 56
Discharge: HOME | End: 2021-03-12
Attending: STUDENT IN AN ORGANIZED HEALTH CARE EDUCATION/TRAINING PROGRAM | Admitting: STUDENT IN AN ORGANIZED HEALTH CARE EDUCATION/TRAINING PROGRAM
Payer: MEDICARE

## 2021-03-12 ENCOUNTER — ANESTHESIA (OUTPATIENT)
Dept: ENDOSCOPY | Facility: HOSPITAL | Age: 56
End: 2021-03-12
Payer: MEDICARE

## 2021-03-12 VITALS
HEART RATE: 66 BPM | RESPIRATION RATE: 18 BRPM | TEMPERATURE: 98 F | DIASTOLIC BLOOD PRESSURE: 67 MMHG | SYSTOLIC BLOOD PRESSURE: 140 MMHG | BODY MASS INDEX: 21.34 KG/M2 | HEIGHT: 64 IN | OXYGEN SATURATION: 98 % | WEIGHT: 125 LBS

## 2021-03-12 DIAGNOSIS — Z12.11 COLON CANCER SCREENING: ICD-10-CM

## 2021-03-12 PROCEDURE — 88305 TISSUE EXAM BY PATHOLOGIST: ICD-10-PCS | Mod: 26,,, | Performed by: PATHOLOGY

## 2021-03-12 PROCEDURE — 63600175 PHARM REV CODE 636 W HCPCS: Performed by: NURSE ANESTHETIST, CERTIFIED REGISTERED

## 2021-03-12 PROCEDURE — 45380 COLONOSCOPY AND BIOPSY: CPT | Performed by: STUDENT IN AN ORGANIZED HEALTH CARE EDUCATION/TRAINING PROGRAM

## 2021-03-12 PROCEDURE — 45380 COLONOSCOPY AND BIOPSY: CPT | Mod: PT,,, | Performed by: STUDENT IN AN ORGANIZED HEALTH CARE EDUCATION/TRAINING PROGRAM

## 2021-03-12 PROCEDURE — 37000008 HC ANESTHESIA 1ST 15 MINUTES: Performed by: STUDENT IN AN ORGANIZED HEALTH CARE EDUCATION/TRAINING PROGRAM

## 2021-03-12 PROCEDURE — 37000009 HC ANESTHESIA EA ADD 15 MINS: Performed by: STUDENT IN AN ORGANIZED HEALTH CARE EDUCATION/TRAINING PROGRAM

## 2021-03-12 PROCEDURE — D9220A PRA ANESTHESIA: ICD-10-PCS | Mod: PT,CRNA,, | Performed by: NURSE ANESTHETIST, CERTIFIED REGISTERED

## 2021-03-12 PROCEDURE — 45380 PR COLONOSCOPY,BIOPSY: ICD-10-PCS | Mod: PT,,, | Performed by: STUDENT IN AN ORGANIZED HEALTH CARE EDUCATION/TRAINING PROGRAM

## 2021-03-12 PROCEDURE — 25000003 PHARM REV CODE 250: Performed by: NURSE ANESTHETIST, CERTIFIED REGISTERED

## 2021-03-12 PROCEDURE — 88305 TISSUE EXAM BY PATHOLOGIST: CPT | Mod: 26,,, | Performed by: PATHOLOGY

## 2021-03-12 PROCEDURE — 25000003 PHARM REV CODE 250: Performed by: ANESTHESIOLOGY

## 2021-03-12 PROCEDURE — D9220A PRA ANESTHESIA: Mod: PT,CRNA,, | Performed by: NURSE ANESTHETIST, CERTIFIED REGISTERED

## 2021-03-12 PROCEDURE — D9220A PRA ANESTHESIA: ICD-10-PCS | Mod: PT,ANES,, | Performed by: ANESTHESIOLOGY

## 2021-03-12 PROCEDURE — 88305 TISSUE EXAM BY PATHOLOGIST: CPT | Performed by: PATHOLOGY

## 2021-03-12 PROCEDURE — D9220A PRA ANESTHESIA: Mod: PT,ANES,, | Performed by: ANESTHESIOLOGY

## 2021-03-12 PROCEDURE — 27201012 HC FORCEPS, HOT/COLD, DISP: Performed by: STUDENT IN AN ORGANIZED HEALTH CARE EDUCATION/TRAINING PROGRAM

## 2021-03-12 RX ORDER — LIDOCAINE HYDROCHLORIDE 10 MG/ML
1 INJECTION, SOLUTION EPIDURAL; INFILTRATION; INTRACAUDAL; PERINEURAL ONCE
Status: DISCONTINUED | OUTPATIENT
Start: 2021-03-12 | End: 2021-03-12 | Stop reason: HOSPADM

## 2021-03-12 RX ORDER — PROPOFOL 10 MG/ML
VIAL (ML) INTRAVENOUS
Status: DISCONTINUED | OUTPATIENT
Start: 2021-03-12 | End: 2021-03-12

## 2021-03-12 RX ORDER — ACETAMINOPHEN 500 MG
1000 TABLET ORAL ONCE
Status: DISCONTINUED | OUTPATIENT
Start: 2021-03-12 | End: 2021-03-12 | Stop reason: HOSPADM

## 2021-03-12 RX ORDER — LIDOCAINE HYDROCHLORIDE 20 MG/ML
INJECTION INTRAVENOUS
Status: DISCONTINUED | OUTPATIENT
Start: 2021-03-12 | End: 2021-03-12

## 2021-03-12 RX ORDER — SODIUM CHLORIDE 9 MG/ML
INJECTION, SOLUTION INTRAVENOUS CONTINUOUS
Status: DISCONTINUED | OUTPATIENT
Start: 2021-03-12 | End: 2021-03-12 | Stop reason: HOSPADM

## 2021-03-12 RX ADMIN — PROPOFOL 20 MG: 10 INJECTION, EMULSION INTRAVENOUS at 10:03

## 2021-03-12 RX ADMIN — PROPOFOL 30 MG: 10 INJECTION, EMULSION INTRAVENOUS at 10:03

## 2021-03-12 RX ADMIN — SODIUM CHLORIDE: 0.9 INJECTION, SOLUTION INTRAVENOUS at 09:03

## 2021-03-12 RX ADMIN — Medication 50 MG: at 10:03

## 2021-03-15 LAB
FINAL PATHOLOGIC DIAGNOSIS: NORMAL
GROSS: NORMAL
Lab: NORMAL

## 2021-03-17 ENCOUNTER — TELEPHONE (OUTPATIENT)
Dept: HEMATOLOGY/ONCOLOGY | Facility: CLINIC | Age: 56
End: 2021-03-17

## 2021-03-23 DIAGNOSIS — I42.8 NICM (NONISCHEMIC CARDIOMYOPATHY): ICD-10-CM

## 2021-03-23 DIAGNOSIS — Z01.812 ENCOUNTER FOR PRE-OPERATIVE LABORATORY TESTING: Primary | ICD-10-CM

## 2021-03-23 DIAGNOSIS — I49.9 CARDIAC ARRHYTHMIA, UNSPECIFIED CARDIAC ARRHYTHMIA TYPE: ICD-10-CM

## 2021-03-26 ENCOUNTER — HOSPITAL ENCOUNTER (OUTPATIENT)
Dept: VASCULAR SURGERY | Facility: CLINIC | Age: 56
Discharge: HOME | End: 2021-03-26
Attending: SURGERY
Payer: MEDICARE

## 2021-03-26 ENCOUNTER — TELEPHONE (OUTPATIENT)
Dept: VASCULAR SURGERY | Facility: CLINIC | Age: 56
End: 2021-03-26

## 2021-03-26 ENCOUNTER — OFFICE VISIT (OUTPATIENT)
Dept: VASCULAR SURGERY | Facility: CLINIC | Age: 56
End: 2021-03-26
Payer: MEDICARE

## 2021-03-26 VITALS
WEIGHT: 121.25 LBS | DIASTOLIC BLOOD PRESSURE: 79 MMHG | HEIGHT: 64 IN | HEART RATE: 74 BPM | TEMPERATURE: 99 F | BODY MASS INDEX: 20.7 KG/M2 | SYSTOLIC BLOOD PRESSURE: 157 MMHG

## 2021-03-26 DIAGNOSIS — N18.6 ESRD (END STAGE RENAL DISEASE) ON DIALYSIS: Primary | ICD-10-CM

## 2021-03-26 DIAGNOSIS — Z99.2 ESRD (END STAGE RENAL DISEASE) ON DIALYSIS: ICD-10-CM

## 2021-03-26 DIAGNOSIS — N18.6 ESRD (END STAGE RENAL DISEASE) ON DIALYSIS: ICD-10-CM

## 2021-03-26 DIAGNOSIS — Z99.2 ESRD (END STAGE RENAL DISEASE) ON DIALYSIS: Primary | ICD-10-CM

## 2021-03-26 PROCEDURE — 93990 DOPPLER FLOW TESTING: CPT | Mod: 26,S$PBB,, | Performed by: SURGERY

## 2021-03-26 PROCEDURE — 99214 OFFICE O/P EST MOD 30 MIN: CPT | Mod: PBBFAC,25 | Performed by: SURGERY

## 2021-03-26 PROCEDURE — 99213 PR OFFICE/OUTPT VISIT, EST, LEVL III, 20-29 MIN: ICD-10-PCS | Mod: S$PBB,,, | Performed by: SURGERY

## 2021-03-26 PROCEDURE — 93990 PR DUPLEX HEMODIALYSIS ACCESS: ICD-10-PCS | Mod: 26,S$PBB,, | Performed by: SURGERY

## 2021-03-26 PROCEDURE — 93990 DOPPLER FLOW TESTING: CPT | Mod: PBBFAC | Performed by: SURGERY

## 2021-03-26 PROCEDURE — 99213 OFFICE O/P EST LOW 20 MIN: CPT | Mod: S$PBB,,, | Performed by: SURGERY

## 2021-03-26 PROCEDURE — 99999 PR PBB SHADOW E&M-EST. PATIENT-LVL IV: CPT | Mod: PBBFAC,,, | Performed by: SURGERY

## 2021-03-26 PROCEDURE — 99999 PR PBB SHADOW E&M-EST. PATIENT-LVL IV: ICD-10-PCS | Mod: PBBFAC,,, | Performed by: SURGERY

## 2021-04-05 DIAGNOSIS — I42.8 CARDIOMYOPATHY, NONISCHEMIC: Primary | ICD-10-CM

## 2021-04-05 DIAGNOSIS — I49.9 CARDIAC ARRHYTHMIA, UNSPECIFIED CARDIAC ARRHYTHMIA TYPE: ICD-10-CM

## 2021-04-09 ENCOUNTER — HOSPITAL ENCOUNTER (EMERGENCY)
Facility: HOSPITAL | Age: 56
Discharge: HOME | End: 2021-04-09
Attending: EMERGENCY MEDICINE
Payer: MEDICARE

## 2021-04-09 VITALS
BODY MASS INDEX: 21.46 KG/M2 | HEART RATE: 68 BPM | OXYGEN SATURATION: 98 % | WEIGHT: 125 LBS | TEMPERATURE: 98 F | RESPIRATION RATE: 16 BRPM | SYSTOLIC BLOOD PRESSURE: 146 MMHG | DIASTOLIC BLOOD PRESSURE: 81 MMHG

## 2021-04-09 DIAGNOSIS — K62.5 RECTAL BLEEDING: Primary | ICD-10-CM

## 2021-04-09 DIAGNOSIS — K64.4 EXTERNAL HEMORRHOID: ICD-10-CM

## 2021-04-09 LAB
ALBUMIN SERPL BCP-MCNC: 2.8 G/DL (ref 3.5–5.2)
ALP SERPL-CCNC: 186 U/L (ref 55–135)
ALT SERPL W/O P-5'-P-CCNC: 30 U/L (ref 10–44)
ANION GAP SERPL CALC-SCNC: 15 MMOL/L (ref 8–16)
AST SERPL-CCNC: 44 U/L (ref 10–40)
BASOPHILS # BLD AUTO: 0.04 K/UL (ref 0–0.2)
BASOPHILS NFR BLD: 1.1 % (ref 0–1.9)
BILIRUB SERPL-MCNC: 0.6 MG/DL (ref 0.1–1)
BUN SERPL-MCNC: 40 MG/DL (ref 6–20)
CALCIUM SERPL-MCNC: 7.1 MG/DL (ref 8.7–10.5)
CHLORIDE SERPL-SCNC: 104 MMOL/L (ref 95–110)
CO2 SERPL-SCNC: 21 MMOL/L (ref 23–29)
CREAT SERPL-MCNC: 5.6 MG/DL (ref 0.5–1.4)
DIFFERENTIAL METHOD: ABNORMAL
EOSINOPHIL # BLD AUTO: 0.2 K/UL (ref 0–0.5)
EOSINOPHIL NFR BLD: 5.7 % (ref 0–8)
ERYTHROCYTE [DISTWIDTH] IN BLOOD BY AUTOMATED COUNT: 15.2 % (ref 11.5–14.5)
EST. GFR  (AFRICAN AMERICAN): 9 ML/MIN/1.73 M^2
EST. GFR  (NON AFRICAN AMERICAN): 8 ML/MIN/1.73 M^2
GLUCOSE SERPL-MCNC: 95 MG/DL (ref 70–110)
HCT VFR BLD AUTO: 32.1 % (ref 37–48.5)
HGB BLD-MCNC: 10.8 G/DL (ref 12–16)
IMM GRANULOCYTES # BLD AUTO: 0.02 K/UL (ref 0–0.04)
IMM GRANULOCYTES NFR BLD AUTO: 0.6 % (ref 0–0.5)
INR PPP: 1.2 (ref 0.8–1.2)
LYMPHOCYTES # BLD AUTO: 0.9 K/UL (ref 1–4.8)
LYMPHOCYTES NFR BLD: 26.3 % (ref 18–48)
MCH RBC QN AUTO: 35.5 PG (ref 27–31)
MCHC RBC AUTO-ENTMCNC: 33.6 G/DL (ref 32–36)
MCV RBC AUTO: 106 FL (ref 82–98)
MONOCYTES # BLD AUTO: 0.5 K/UL (ref 0.3–1)
MONOCYTES NFR BLD: 12.9 % (ref 4–15)
NEUTROPHILS # BLD AUTO: 1.9 K/UL (ref 1.8–7.7)
NEUTROPHILS NFR BLD: 53.4 % (ref 38–73)
NRBC BLD-RTO: 0 /100 WBC
PLATELET # BLD AUTO: 162 K/UL (ref 150–450)
PMV BLD AUTO: 10.6 FL (ref 9.2–12.9)
POTASSIUM SERPL-SCNC: 4.1 MMOL/L (ref 3.5–5.1)
PROT SERPL-MCNC: 6.2 G/DL (ref 6–8.4)
PROTHROMBIN TIME: 12.2 SEC (ref 9–12.5)
RBC # BLD AUTO: 3.04 M/UL (ref 4–5.4)
SODIUM SERPL-SCNC: 140 MMOL/L (ref 136–145)
WBC # BLD AUTO: 3.5 K/UL (ref 3.9–12.7)

## 2021-04-09 PROCEDURE — 99284 EMERGENCY DEPT VISIT MOD MDM: CPT

## 2021-04-09 PROCEDURE — 85610 PROTHROMBIN TIME: CPT | Performed by: EMERGENCY MEDICINE

## 2021-04-09 PROCEDURE — 80053 COMPREHEN METABOLIC PANEL: CPT | Performed by: PHYSICIAN ASSISTANT

## 2021-04-09 PROCEDURE — 85025 COMPLETE CBC W/AUTO DIFF WBC: CPT | Performed by: PHYSICIAN ASSISTANT

## 2021-04-09 RX ORDER — HYDROCORTISONE 25 MG/G
CREAM TOPICAL 2 TIMES DAILY
Qty: 28 G | Refills: 0 | Status: SHIPPED | OUTPATIENT
Start: 2021-04-09 | End: 2022-06-28

## 2021-04-09 RX ORDER — HYDROCORTISONE ACETATE 25 MG/1
25 SUPPOSITORY RECTAL
Status: DISCONTINUED | OUTPATIENT
Start: 2021-04-09 | End: 2021-04-09 | Stop reason: HOSPADM

## 2021-04-09 RX ORDER — HYDROCORTISONE 25 MG/G
CREAM TOPICAL
Status: DISCONTINUED | OUTPATIENT
Start: 2021-04-09 | End: 2021-04-09 | Stop reason: HOSPADM

## 2021-04-09 RX ORDER — HYDROCORTISONE ACETATE 25 MG/1
25 SUPPOSITORY RECTAL 2 TIMES DAILY
Qty: 20 SUPPOSITORY | Refills: 0 | Status: SHIPPED | OUTPATIENT
Start: 2021-04-09 | End: 2021-04-19

## 2021-04-16 ENCOUNTER — LAB VISIT (OUTPATIENT)
Dept: LAB | Facility: HOSPITAL | Age: 56
End: 2021-04-16
Attending: INTERNAL MEDICINE
Payer: MEDICARE

## 2021-04-16 ENCOUNTER — TELEPHONE (OUTPATIENT)
Dept: ELECTROPHYSIOLOGY | Facility: CLINIC | Age: 56
End: 2021-04-16

## 2021-04-16 ENCOUNTER — LAB VISIT (OUTPATIENT)
Dept: FAMILY MEDICINE | Facility: CLINIC | Age: 56
End: 2021-04-16
Payer: MEDICARE

## 2021-04-16 DIAGNOSIS — I49.9 CARDIAC ARRHYTHMIA, UNSPECIFIED CARDIAC ARRHYTHMIA TYPE: ICD-10-CM

## 2021-04-16 DIAGNOSIS — I42.8 CARDIOMYOPATHY, NONISCHEMIC: ICD-10-CM

## 2021-04-16 DIAGNOSIS — Z01.818 PRE-OP TESTING: ICD-10-CM

## 2021-04-16 LAB
ANION GAP SERPL CALC-SCNC: 14 MMOL/L (ref 8–16)
APTT BLDCRRT: 26 SEC (ref 21–32)
BASOPHILS # BLD AUTO: 0.05 K/UL (ref 0–0.2)
BASOPHILS NFR BLD: 1 % (ref 0–1.9)
BUN SERPL-MCNC: 46 MG/DL (ref 6–20)
CALCIUM SERPL-MCNC: 7.8 MG/DL (ref 8.7–10.5)
CHLORIDE SERPL-SCNC: 106 MMOL/L (ref 95–110)
CO2 SERPL-SCNC: 23 MMOL/L (ref 23–29)
CREAT SERPL-MCNC: 6 MG/DL (ref 0.5–1.4)
DIFFERENTIAL METHOD: ABNORMAL
EOSINOPHIL # BLD AUTO: 0.3 K/UL (ref 0–0.5)
EOSINOPHIL NFR BLD: 6 % (ref 0–8)
ERYTHROCYTE [DISTWIDTH] IN BLOOD BY AUTOMATED COUNT: 15.1 % (ref 11.5–14.5)
EST. GFR  (AFRICAN AMERICAN): 8.3 ML/MIN/1.73 M^2
EST. GFR  (NON AFRICAN AMERICAN): 7.2 ML/MIN/1.73 M^2
GLUCOSE SERPL-MCNC: 92 MG/DL (ref 70–110)
HCT VFR BLD AUTO: 34.8 % (ref 37–48.5)
HGB BLD-MCNC: 11 G/DL (ref 12–16)
IMM GRANULOCYTES # BLD AUTO: 0.01 K/UL (ref 0–0.04)
IMM GRANULOCYTES NFR BLD AUTO: 0.2 % (ref 0–0.5)
INR PPP: 1 (ref 0.8–1.2)
LYMPHOCYTES # BLD AUTO: 0.9 K/UL (ref 1–4.8)
LYMPHOCYTES NFR BLD: 17 % (ref 18–48)
MCH RBC QN AUTO: 34.6 PG (ref 27–31)
MCHC RBC AUTO-ENTMCNC: 31.6 G/DL (ref 32–36)
MCV RBC AUTO: 109 FL (ref 82–98)
MONOCYTES # BLD AUTO: 0.5 K/UL (ref 0.3–1)
MONOCYTES NFR BLD: 9.8 % (ref 4–15)
NEUTROPHILS # BLD AUTO: 3.4 K/UL (ref 1.8–7.7)
NEUTROPHILS NFR BLD: 66 % (ref 38–73)
NRBC BLD-RTO: 0 /100 WBC
PLATELET # BLD AUTO: 150 K/UL (ref 150–450)
PMV BLD AUTO: 11.2 FL (ref 9.2–12.9)
POTASSIUM SERPL-SCNC: 4.5 MMOL/L (ref 3.5–5.1)
PROTHROMBIN TIME: 11.2 SEC (ref 9–12.5)
RBC # BLD AUTO: 3.18 M/UL (ref 4–5.4)
SODIUM SERPL-SCNC: 143 MMOL/L (ref 136–145)
WBC # BLD AUTO: 5.18 K/UL (ref 3.9–12.7)

## 2021-04-16 PROCEDURE — 36415 COLL VENOUS BLD VENIPUNCTURE: CPT | Mod: PO | Performed by: INTERNAL MEDICINE

## 2021-04-16 PROCEDURE — U0005 INFEC AGEN DETEC AMPLI PROBE: HCPCS | Performed by: INTERNAL MEDICINE

## 2021-04-16 PROCEDURE — 85025 COMPLETE CBC W/AUTO DIFF WBC: CPT | Performed by: INTERNAL MEDICINE

## 2021-04-16 PROCEDURE — 85730 THROMBOPLASTIN TIME PARTIAL: CPT | Performed by: INTERNAL MEDICINE

## 2021-04-16 PROCEDURE — U0003 INFECTIOUS AGENT DETECTION BY NUCLEIC ACID (DNA OR RNA); SEVERE ACUTE RESPIRATORY SYNDROME CORONAVIRUS 2 (SARS-COV-2) (CORONAVIRUS DISEASE [COVID-19]), AMPLIFIED PROBE TECHNIQUE, MAKING USE OF HIGH THROUGHPUT TECHNOLOGIES AS DESCRIBED BY CMS-2020-01-R: HCPCS | Performed by: INTERNAL MEDICINE

## 2021-04-16 PROCEDURE — 80048 BASIC METABOLIC PNL TOTAL CA: CPT | Performed by: INTERNAL MEDICINE

## 2021-04-16 PROCEDURE — 85610 PROTHROMBIN TIME: CPT | Performed by: INTERNAL MEDICINE

## 2021-04-17 LAB — SARS-COV-2 RNA RESP QL NAA+PROBE: NOT DETECTED

## 2021-04-18 ENCOUNTER — ANESTHESIA EVENT (OUTPATIENT)
Dept: MEDSURG UNIT | Facility: HOSPITAL | Age: 56
End: 2021-04-18
Payer: MEDICARE

## 2021-04-18 DIAGNOSIS — Z95.810 AUTOMATIC IMPLANTABLE CARDIAC DEFIBRILLATOR IN SITU: ICD-10-CM

## 2021-04-18 DIAGNOSIS — I42.8 CARDIOMYOPATHY, NONISCHEMIC: Primary | ICD-10-CM

## 2021-04-19 ENCOUNTER — ANESTHESIA (OUTPATIENT)
Dept: MEDSURG UNIT | Facility: HOSPITAL | Age: 56
End: 2021-04-19
Payer: MEDICARE

## 2021-04-19 ENCOUNTER — HOSPITAL ENCOUNTER (OUTPATIENT)
Facility: HOSPITAL | Age: 56
Discharge: HOME | End: 2021-04-20
Attending: INTERNAL MEDICINE | Admitting: INTERNAL MEDICINE
Payer: MEDICARE

## 2021-04-19 DIAGNOSIS — M89.9 CHRONIC KIDNEY DISEASE-MINERAL AND BONE DISORDER: ICD-10-CM

## 2021-04-19 DIAGNOSIS — Z95.9 CARDIAC DEVICE IN SITU: ICD-10-CM

## 2021-04-19 DIAGNOSIS — E87.5 HYPERKALEMIA: ICD-10-CM

## 2021-04-19 DIAGNOSIS — E83.39 HYPERPHOSPHATEMIA: ICD-10-CM

## 2021-04-19 DIAGNOSIS — I42.8 NICM (NONISCHEMIC CARDIOMYOPATHY): ICD-10-CM

## 2021-04-19 DIAGNOSIS — N18.6 ANEMIA IN ESRD (END-STAGE RENAL DISEASE): ICD-10-CM

## 2021-04-19 DIAGNOSIS — Z99.2 ESRD (END STAGE RENAL DISEASE) ON DIALYSIS: ICD-10-CM

## 2021-04-19 DIAGNOSIS — I10 ESSENTIAL HYPERTENSION: ICD-10-CM

## 2021-04-19 DIAGNOSIS — N18.6 ESRD (END STAGE RENAL DISEASE) ON DIALYSIS: ICD-10-CM

## 2021-04-19 DIAGNOSIS — D63.1 ANEMIA IN ESRD (END-STAGE RENAL DISEASE): ICD-10-CM

## 2021-04-19 DIAGNOSIS — C64.9 METASTATIC RENAL CELL CARCINOMA, UNSPECIFIED LATERALITY: ICD-10-CM

## 2021-04-19 DIAGNOSIS — I49.9 ARRHYTHMIA: ICD-10-CM

## 2021-04-19 DIAGNOSIS — I50.22 CHF (CONGESTIVE HEART FAILURE), NYHA CLASS II, CHRONIC, SYSTOLIC: ICD-10-CM

## 2021-04-19 DIAGNOSIS — N18.9 CHRONIC KIDNEY DISEASE-MINERAL AND BONE DISORDER: ICD-10-CM

## 2021-04-19 DIAGNOSIS — E83.9 CHRONIC KIDNEY DISEASE-MINERAL AND BONE DISORDER: ICD-10-CM

## 2021-04-19 LAB
ALBUMIN SERPL BCP-MCNC: 2.8 G/DL (ref 3.5–5.2)
ANION GAP SERPL CALC-SCNC: 17 MMOL/L (ref 8–16)
ANION GAP SERPL CALC-SCNC: 17 MMOL/L (ref 8–16)
BUN SERPL-MCNC: 62 MG/DL (ref 6–20)
BUN SERPL-MCNC: 65 MG/DL (ref 6–20)
CALCIUM SERPL-MCNC: 7.4 MG/DL (ref 8.7–10.5)
CALCIUM SERPL-MCNC: 7.4 MG/DL (ref 8.7–10.5)
CHLORIDE SERPL-SCNC: 106 MMOL/L (ref 95–110)
CHLORIDE SERPL-SCNC: 108 MMOL/L (ref 95–110)
CO2 SERPL-SCNC: 17 MMOL/L (ref 23–29)
CO2 SERPL-SCNC: 17 MMOL/L (ref 23–29)
CREAT SERPL-MCNC: 7.6 MG/DL (ref 0.5–1.4)
CREAT SERPL-MCNC: 7.8 MG/DL (ref 0.5–1.4)
EST. GFR  (AFRICAN AMERICAN): 6.1 ML/MIN/1.73 M^2
EST. GFR  (AFRICAN AMERICAN): 6.3 ML/MIN/1.73 M^2
EST. GFR  (NON AFRICAN AMERICAN): 5.3 ML/MIN/1.73 M^2
EST. GFR  (NON AFRICAN AMERICAN): 5.4 ML/MIN/1.73 M^2
GLUCOSE SERPL-MCNC: 133 MG/DL (ref 70–110)
GLUCOSE SERPL-MCNC: 136 MG/DL (ref 70–110)
PHOSPHATE SERPL-MCNC: 8.7 MG/DL (ref 2.7–4.5)
POTASSIUM SERPL-SCNC: 6.2 MMOL/L (ref 3.5–5.1)
POTASSIUM SERPL-SCNC: 6.3 MMOL/L (ref 3.5–5.1)
SODIUM SERPL-SCNC: 140 MMOL/L (ref 136–145)
SODIUM SERPL-SCNC: 142 MMOL/L (ref 136–145)

## 2021-04-19 PROCEDURE — 36415 COLL VENOUS BLD VENIPUNCTURE: CPT | Performed by: INTERNAL MEDICINE

## 2021-04-19 PROCEDURE — C1722 AICD, SINGLE CHAMBER: HCPCS | Performed by: INTERNAL MEDICINE

## 2021-04-19 PROCEDURE — 25000003 PHARM REV CODE 250: Performed by: NURSE ANESTHETIST, CERTIFIED REGISTERED

## 2021-04-19 PROCEDURE — 80069 RENAL FUNCTION PANEL: CPT | Performed by: NURSE PRACTITIONER

## 2021-04-19 PROCEDURE — 93005 ELECTROCARDIOGRAM TRACING: CPT

## 2021-04-19 PROCEDURE — 63600175 PHARM REV CODE 636 W HCPCS: Performed by: INTERNAL MEDICINE

## 2021-04-19 PROCEDURE — C1896 LEAD, AICD, NON SING/DUAL: HCPCS | Performed by: INTERNAL MEDICINE

## 2021-04-19 PROCEDURE — 37000008 HC ANESTHESIA 1ST 15 MINUTES: Performed by: INTERNAL MEDICINE

## 2021-04-19 PROCEDURE — D9220A PRA ANESTHESIA: ICD-10-PCS | Mod: ANES,,, | Performed by: ANESTHESIOLOGY

## 2021-04-19 PROCEDURE — 63600175 PHARM REV CODE 636 W HCPCS: Performed by: NURSE ANESTHETIST, CERTIFIED REGISTERED

## 2021-04-19 PROCEDURE — A4216 STERILE WATER/SALINE, 10 ML: HCPCS | Performed by: INTERNAL MEDICINE

## 2021-04-19 PROCEDURE — 63600175 PHARM REV CODE 636 W HCPCS: Performed by: ANESTHESIOLOGY

## 2021-04-19 PROCEDURE — D9220A PRA ANESTHESIA: ICD-10-PCS | Mod: CRNA,,, | Performed by: NURSE ANESTHETIST, CERTIFIED REGISTERED

## 2021-04-19 PROCEDURE — D9220A PRA ANESTHESIA: Mod: CRNA,,, | Performed by: NURSE ANESTHETIST, CERTIFIED REGISTERED

## 2021-04-19 PROCEDURE — 25000003 PHARM REV CODE 250: Performed by: INTERNAL MEDICINE

## 2021-04-19 PROCEDURE — 33270 INS/REP SUBQ DEFIBRILLATOR: CPT | Mod: ,,, | Performed by: INTERNAL MEDICINE

## 2021-04-19 PROCEDURE — D9220A PRA ANESTHESIA: Mod: ANES,,, | Performed by: ANESTHESIOLOGY

## 2021-04-19 PROCEDURE — 36415 COLL VENOUS BLD VENIPUNCTURE: CPT | Performed by: NURSE PRACTITIONER

## 2021-04-19 PROCEDURE — 37000009 HC ANESTHESIA EA ADD 15 MINS: Performed by: INTERNAL MEDICINE

## 2021-04-19 PROCEDURE — 33270 INS/REP SUBQ DEFIBRILLATOR: CPT | Performed by: INTERNAL MEDICINE

## 2021-04-19 PROCEDURE — 80048 BASIC METABOLIC PNL TOTAL CA: CPT | Performed by: INTERNAL MEDICINE

## 2021-04-19 PROCEDURE — 99214 PR OFFICE/OUTPT VISIT, EST, LEVL IV, 30-39 MIN: ICD-10-PCS | Mod: 57,,, | Performed by: INTERNAL MEDICINE

## 2021-04-19 PROCEDURE — 99214 OFFICE O/P EST MOD 30 MIN: CPT | Mod: 57,,, | Performed by: INTERNAL MEDICINE

## 2021-04-19 PROCEDURE — 33270 PR INS/REPL PERM SUBQ DEFIB SYSTEM: ICD-10-PCS | Mod: ,,, | Performed by: INTERNAL MEDICINE

## 2021-04-19 PROCEDURE — 93010 ELECTROCARDIOGRAM REPORT: CPT | Mod: ,,, | Performed by: INTERNAL MEDICINE

## 2021-04-19 PROCEDURE — 93010 EKG 12-LEAD: ICD-10-PCS | Mod: ,,, | Performed by: INTERNAL MEDICINE

## 2021-04-19 PROCEDURE — 93010 ELECTROCARDIOGRAM REPORT: CPT | Mod: 76,,, | Performed by: INTERNAL MEDICINE

## 2021-04-19 DEVICE — SUBCUTANEOUS ELECTRODE
Type: IMPLANTABLE DEVICE | Site: CHEST  WALL | Status: FUNCTIONAL
Brand: EMBLEM™ S-ICD

## 2021-04-19 DEVICE — SUBCUTANEOUS IMPLANTABLE CARDIOVERTER DEFIBRILLATOR
Type: IMPLANTABLE DEVICE | Site: CHEST  WALL | Status: FUNCTIONAL
Brand: EMBLEM™ MRI S-ICD

## 2021-04-19 RX ORDER — CEFAZOLIN SODIUM 1 G/3ML
2 INJECTION, POWDER, FOR SOLUTION INTRAMUSCULAR; INTRAVENOUS
Status: DISCONTINUED | OUTPATIENT
Start: 2021-04-19 | End: 2022-04-18

## 2021-04-19 RX ORDER — OXYCODONE AND ACETAMINOPHEN 5; 325 MG/1; MG/1
2 TABLET ORAL EVERY 6 HOURS PRN
Status: DISCONTINUED | OUTPATIENT
Start: 2021-04-19 | End: 2021-04-20 | Stop reason: HOSPADM

## 2021-04-19 RX ORDER — SODIUM CHLORIDE 0.9 % (FLUSH) 0.9 %
3 SYRINGE (ML) INJECTION
Status: DISCONTINUED | OUTPATIENT
Start: 2021-04-19 | End: 2021-04-19 | Stop reason: HOSPADM

## 2021-04-19 RX ORDER — DOXYCYCLINE 100 MG/1
100 CAPSULE ORAL EVERY 12 HOURS
Qty: 10 CAPSULE | Refills: 0 | Status: SHIPPED | OUTPATIENT
Start: 2021-04-20 | End: 2021-04-25

## 2021-04-19 RX ORDER — PHENYLEPHRINE HCL IN 0.9% NACL 1 MG/10 ML
SYRINGE (ML) INTRAVENOUS
Status: DISCONTINUED | OUTPATIENT
Start: 2021-04-19 | End: 2021-04-19

## 2021-04-19 RX ORDER — ONDANSETRON 2 MG/ML
4 INJECTION INTRAMUSCULAR; INTRAVENOUS ONCE AS NEEDED
Status: DISCONTINUED | OUTPATIENT
Start: 2021-04-19 | End: 2021-04-19 | Stop reason: HOSPADM

## 2021-04-19 RX ORDER — AMLODIPINE BESYLATE 5 MG/1
5 TABLET ORAL DAILY
Status: DISCONTINUED | OUTPATIENT
Start: 2021-04-20 | End: 2021-04-20 | Stop reason: HOSPADM

## 2021-04-19 RX ORDER — HYDROMORPHONE HYDROCHLORIDE 1 MG/ML
0.2 INJECTION, SOLUTION INTRAMUSCULAR; INTRAVENOUS; SUBCUTANEOUS EVERY 5 MIN PRN
Status: DISCONTINUED | OUTPATIENT
Start: 2021-04-19 | End: 2021-04-19 | Stop reason: HOSPADM

## 2021-04-19 RX ORDER — ACETAMINOPHEN 325 MG/1
650 TABLET ORAL EVERY 4 HOURS PRN
Status: DISCONTINUED | OUTPATIENT
Start: 2021-04-19 | End: 2021-04-20 | Stop reason: HOSPADM

## 2021-04-19 RX ORDER — OXYCODONE HYDROCHLORIDE 5 MG/1
5 TABLET ORAL EVERY 6 HOURS PRN
Status: DISCONTINUED | OUTPATIENT
Start: 2021-04-19 | End: 2021-04-19

## 2021-04-19 RX ORDER — VANCOMYCIN HYDROCHLORIDE 1 G/20ML
INJECTION, POWDER, LYOPHILIZED, FOR SOLUTION INTRAVENOUS
Status: DISCONTINUED | OUTPATIENT
Start: 2021-04-19 | End: 2021-04-19 | Stop reason: HOSPADM

## 2021-04-19 RX ORDER — OXYCODONE AND ACETAMINOPHEN 5; 325 MG/1; MG/1
1 TABLET ORAL ONCE AS NEEDED
Status: COMPLETED | OUTPATIENT
Start: 2021-04-19 | End: 2021-04-19

## 2021-04-19 RX ORDER — DIPHENHYDRAMINE HYDROCHLORIDE 50 MG/ML
25 INJECTION INTRAMUSCULAR; INTRAVENOUS EVERY 6 HOURS PRN
Status: DISCONTINUED | OUTPATIENT
Start: 2021-04-19 | End: 2021-04-19 | Stop reason: HOSPADM

## 2021-04-19 RX ORDER — METOPROLOL SUCCINATE 100 MG/1
100 TABLET, EXTENDED RELEASE ORAL DAILY
Status: DISCONTINUED | OUTPATIENT
Start: 2021-04-20 | End: 2021-04-20 | Stop reason: HOSPADM

## 2021-04-19 RX ORDER — NOREPINEPHRINE BITARTRATE 1 MG/ML
INJECTION, SOLUTION INTRAVENOUS
Status: DISCONTINUED | OUTPATIENT
Start: 2021-04-19 | End: 2021-04-19

## 2021-04-19 RX ORDER — BENZONATATE 100 MG/1
200 CAPSULE ORAL 3 TIMES DAILY PRN
Status: DISCONTINUED | OUTPATIENT
Start: 2021-04-19 | End: 2021-04-20 | Stop reason: HOSPADM

## 2021-04-19 RX ORDER — SODIUM CHLORIDE 9 MG/ML
INJECTION, SOLUTION INTRAVENOUS ONCE
Status: COMPLETED | OUTPATIENT
Start: 2021-04-20 | End: 2021-04-20

## 2021-04-19 RX ORDER — FENTANYL CITRATE 50 UG/ML
25 INJECTION, SOLUTION INTRAMUSCULAR; INTRAVENOUS EVERY 5 MIN PRN
Status: DISCONTINUED | OUTPATIENT
Start: 2021-04-19 | End: 2021-04-19 | Stop reason: HOSPADM

## 2021-04-19 RX ORDER — EPINEPHRINE 1 MG/ML
INJECTION, SOLUTION INTRACARDIAC; INTRAMUSCULAR; INTRAVENOUS; SUBCUTANEOUS
Status: DISCONTINUED | OUTPATIENT
Start: 2021-04-19 | End: 2021-04-19

## 2021-04-19 RX ORDER — LIDOCAINE HYDROCHLORIDE 20 MG/ML
INJECTION, SOLUTION INFILTRATION; PERINEURAL
Status: DISCONTINUED | OUTPATIENT
Start: 2021-04-19 | End: 2021-04-19 | Stop reason: HOSPADM

## 2021-04-19 RX ORDER — SODIUM CHLORIDE 9 MG/ML
INJECTION, SOLUTION INTRAMUSCULAR; INTRAVENOUS; SUBCUTANEOUS
Status: DISCONTINUED | OUTPATIENT
Start: 2021-04-19 | End: 2021-04-19 | Stop reason: HOSPADM

## 2021-04-19 RX ORDER — SUCCINYLCHOLINE CHLORIDE 20 MG/ML
INJECTION INTRAMUSCULAR; INTRAVENOUS
Status: DISCONTINUED | OUTPATIENT
Start: 2021-04-19 | End: 2021-04-19

## 2021-04-19 RX ORDER — PROPOFOL 10 MG/ML
VIAL (ML) INTRAVENOUS
Status: DISCONTINUED | OUTPATIENT
Start: 2021-04-19 | End: 2021-04-19

## 2021-04-19 RX ORDER — DOXYCYCLINE HYCLATE 100 MG
100 TABLET ORAL EVERY 12 HOURS
Status: DISCONTINUED | OUTPATIENT
Start: 2021-04-20 | End: 2021-04-20 | Stop reason: HOSPADM

## 2021-04-19 RX ORDER — FENTANYL CITRATE 50 UG/ML
INJECTION, SOLUTION INTRAMUSCULAR; INTRAVENOUS
Status: DISCONTINUED | OUTPATIENT
Start: 2021-04-19 | End: 2021-04-19

## 2021-04-19 RX ORDER — BUPIVACAINE HYDROCHLORIDE 2.5 MG/ML
INJECTION, SOLUTION EPIDURAL; INFILTRATION; INTRACAUDAL
Status: DISCONTINUED | OUTPATIENT
Start: 2021-04-19 | End: 2021-04-19 | Stop reason: HOSPADM

## 2021-04-19 RX ORDER — CEFAZOLIN SODIUM 1 G/3ML
INJECTION, POWDER, FOR SOLUTION INTRAMUSCULAR; INTRAVENOUS
Status: DISCONTINUED | OUTPATIENT
Start: 2021-04-19 | End: 2021-04-19

## 2021-04-19 RX ORDER — HYDRALAZINE HYDROCHLORIDE 50 MG/1
100 TABLET, FILM COATED ORAL EVERY 12 HOURS
Status: DISCONTINUED | OUTPATIENT
Start: 2021-04-20 | End: 2021-04-20 | Stop reason: HOSPADM

## 2021-04-19 RX ORDER — CEFAZOLIN SODIUM 1 G/3ML
2 INJECTION, POWDER, FOR SOLUTION INTRAMUSCULAR; INTRAVENOUS ONCE
Status: COMPLETED | OUTPATIENT
Start: 2021-04-19 | End: 2021-04-19

## 2021-04-19 RX ORDER — MIDAZOLAM HYDROCHLORIDE 1 MG/ML
INJECTION, SOLUTION INTRAMUSCULAR; INTRAVENOUS
Status: DISCONTINUED | OUTPATIENT
Start: 2021-04-19 | End: 2021-04-19

## 2021-04-19 RX ADMIN — CEFAZOLIN 2 G: 330 INJECTION, POWDER, FOR SOLUTION INTRAMUSCULAR; INTRAVENOUS at 08:04

## 2021-04-19 RX ADMIN — NOREPINEPHRINE BITARTRATE 32 MCG: 1 INJECTION INTRAVENOUS at 08:04

## 2021-04-19 RX ADMIN — ISOSORBIDE DINITRATE 30 MG: 10 TABLET ORAL at 07:04

## 2021-04-19 RX ADMIN — NOREPINEPHRINE BITARTRATE 16 MCG: 1 INJECTION INTRAVENOUS at 08:04

## 2021-04-19 RX ADMIN — NOREPINEPHRINE BITARTRATE 0.04 MCG/KG/MIN: 1 INJECTION, SOLUTION, CONCENTRATE INTRAVENOUS at 09:04

## 2021-04-19 RX ADMIN — NOREPINEPHRINE BITARTRATE 48 MCG: 1 INJECTION INTRAVENOUS at 08:04

## 2021-04-19 RX ADMIN — SUCCINYLCHOLINE CHLORIDE 100 MG: 20 INJECTION, SOLUTION INTRAMUSCULAR; INTRAVENOUS; PARENTERAL at 07:04

## 2021-04-19 RX ADMIN — Medication 200 MCG: at 08:04

## 2021-04-19 RX ADMIN — SODIUM CHLORIDE: 0.9 INJECTION, SOLUTION INTRAVENOUS at 07:04

## 2021-04-19 RX ADMIN — OXYCODONE AND ACETAMINOPHEN 2 TABLET: 5; 325 TABLET ORAL at 09:04

## 2021-04-19 RX ADMIN — HYDROMORPHONE HYDROCHLORIDE 0.2 MG: 1 INJECTION, SOLUTION INTRAMUSCULAR; INTRAVENOUS; SUBCUTANEOUS at 11:04

## 2021-04-19 RX ADMIN — PROPOFOL 100 MG: 10 INJECTION, EMULSION INTRAVENOUS at 07:04

## 2021-04-19 RX ADMIN — EPINEPHRINE 10 MCG: 1 INJECTION, SOLUTION, CONCENTRATE INTRAVENOUS at 09:04

## 2021-04-19 RX ADMIN — FENTANYL CITRATE 100 MCG: 50 INJECTION INTRAMUSCULAR; INTRAVENOUS at 07:04

## 2021-04-19 RX ADMIN — OXYCODONE AND ACETAMINOPHEN 1 TABLET: 5; 325 TABLET ORAL at 02:04

## 2021-04-19 RX ADMIN — NOREPINEPHRINE BITARTRATE 32 MCG: 1 INJECTION INTRAVENOUS at 09:04

## 2021-04-19 RX ADMIN — MIDAZOLAM 1 MG: 1 INJECTION INTRAMUSCULAR; INTRAVENOUS at 07:04

## 2021-04-19 RX ADMIN — OXYCODONE HYDROCHLORIDE AND ACETAMINOPHEN 1 TABLET: 5; 325 TABLET ORAL at 11:04

## 2021-04-20 ENCOUNTER — SPECIALTY PHARMACY (OUTPATIENT)
Dept: PHARMACY | Facility: CLINIC | Age: 56
End: 2021-04-20

## 2021-04-20 VITALS
HEIGHT: 64 IN | HEART RATE: 83 BPM | BODY MASS INDEX: 21.78 KG/M2 | DIASTOLIC BLOOD PRESSURE: 85 MMHG | RESPIRATION RATE: 20 BRPM | SYSTOLIC BLOOD PRESSURE: 186 MMHG | WEIGHT: 127.56 LBS | OXYGEN SATURATION: 90 % | TEMPERATURE: 98 F

## 2021-04-20 LAB
ALBUMIN SERPL BCP-MCNC: 2.8 G/DL (ref 3.5–5.2)
ANION GAP SERPL CALC-SCNC: 16 MMOL/L (ref 8–16)
BUN SERPL-MCNC: 72 MG/DL (ref 6–20)
CALCIUM SERPL-MCNC: 7.5 MG/DL (ref 8.7–10.5)
CHLORIDE SERPL-SCNC: 107 MMOL/L (ref 95–110)
CO2 SERPL-SCNC: 18 MMOL/L (ref 23–29)
CREAT SERPL-MCNC: 8.4 MG/DL (ref 0.5–1.4)
EST. GFR  (AFRICAN AMERICAN): 5.6 ML/MIN/1.73 M^2
EST. GFR  (NON AFRICAN AMERICAN): 4.8 ML/MIN/1.73 M^2
GLUCOSE SERPL-MCNC: 92 MG/DL (ref 70–110)
PHOSPHATE SERPL-MCNC: 9.6 MG/DL (ref 2.7–4.5)
POTASSIUM SERPL-SCNC: 6 MMOL/L (ref 3.5–5.1)
SODIUM SERPL-SCNC: 141 MMOL/L (ref 136–145)

## 2021-04-20 PROCEDURE — 99215 OFFICE O/P EST HI 40 MIN: CPT | Mod: ,,, | Performed by: INTERNAL MEDICINE

## 2021-04-20 PROCEDURE — 36415 COLL VENOUS BLD VENIPUNCTURE: CPT | Performed by: NURSE PRACTITIONER

## 2021-04-20 PROCEDURE — 25000003 PHARM REV CODE 250: Performed by: NURSE PRACTITIONER

## 2021-04-20 PROCEDURE — 93010 EKG 12-LEAD: ICD-10-PCS | Mod: ,,, | Performed by: INTERNAL MEDICINE

## 2021-04-20 PROCEDURE — 25000003 PHARM REV CODE 250: Performed by: INTERNAL MEDICINE

## 2021-04-20 PROCEDURE — 93010 ELECTROCARDIOGRAM REPORT: CPT | Mod: ,,, | Performed by: INTERNAL MEDICINE

## 2021-04-20 PROCEDURE — 80100016 HC MAINTENANCE HEMODIALYSIS

## 2021-04-20 PROCEDURE — G0257 UNSCHED DIALYSIS ESRD PT HOS: HCPCS

## 2021-04-20 PROCEDURE — 93005 ELECTROCARDIOGRAM TRACING: CPT

## 2021-04-20 PROCEDURE — 80069 RENAL FUNCTION PANEL: CPT | Performed by: NURSE PRACTITIONER

## 2021-04-20 PROCEDURE — 99215 PR OFFICE/OUTPT VISIT, EST, LEVL V, 40-54 MIN: ICD-10-PCS | Mod: ,,, | Performed by: INTERNAL MEDICINE

## 2021-04-20 RX ORDER — OXYCODONE AND ACETAMINOPHEN 5; 325 MG/1; MG/1
2 TABLET ORAL EVERY 6 HOURS PRN
Qty: 12 TABLET | Refills: 0 | Status: SHIPPED | OUTPATIENT
Start: 2021-04-20 | End: 2021-04-20

## 2021-04-20 RX ORDER — OXYCODONE AND ACETAMINOPHEN 5; 325 MG/1; MG/1
2 TABLET ORAL EVERY 6 HOURS PRN
Qty: 12 TABLET | Refills: 0 | Status: SHIPPED | OUTPATIENT
Start: 2021-04-20 | End: 2021-05-24

## 2021-04-20 RX ORDER — DOXYCYCLINE HYCLATE 100 MG
100 TABLET ORAL EVERY 12 HOURS
Qty: 10 TABLET | Refills: 0 | Status: SHIPPED | OUTPATIENT
Start: 2021-04-20 | End: 2021-04-25

## 2021-04-20 RX ORDER — LIDOCAINE AND PRILOCAINE 25; 25 MG/G; MG/G
CREAM TOPICAL
Status: DISCONTINUED | OUTPATIENT
Start: 2021-04-20 | End: 2021-04-20 | Stop reason: HOSPADM

## 2021-04-20 RX ORDER — HYDRALAZINE HYDROCHLORIDE 25 MG/1
25 TABLET, FILM COATED ORAL ONCE
Status: COMPLETED | OUTPATIENT
Start: 2021-04-20 | End: 2021-04-20

## 2021-04-20 RX ORDER — OXYCODONE AND ACETAMINOPHEN 10; 325 MG/1; MG/1
1 TABLET ORAL ONCE
Status: DISCONTINUED | OUTPATIENT
Start: 2021-04-20 | End: 2021-04-20 | Stop reason: HOSPADM

## 2021-04-20 RX ADMIN — HYDRALAZINE HYDROCHLORIDE 100 MG: 50 TABLET, FILM COATED ORAL at 10:04

## 2021-04-20 RX ADMIN — OXYCODONE AND ACETAMINOPHEN 2 TABLET: 5; 325 TABLET ORAL at 01:04

## 2021-04-20 RX ADMIN — AMLODIPINE BESYLATE 5 MG: 5 TABLET ORAL at 10:04

## 2021-04-20 RX ADMIN — ACETAMINOPHEN 650 MG: 325 TABLET ORAL at 11:04

## 2021-04-20 RX ADMIN — HYDRALAZINE HYDROCHLORIDE 25 MG: 25 TABLET, FILM COATED ORAL at 04:04

## 2021-04-20 RX ADMIN — SODIUM CHLORIDE: 0.9 INJECTION, SOLUTION INTRAVENOUS at 08:04

## 2021-04-20 RX ADMIN — METOPROLOL SUCCINATE 100 MG: 100 TABLET, EXTENDED RELEASE ORAL at 04:04

## 2021-04-20 RX ADMIN — OXYCODONE AND ACETAMINOPHEN 2 TABLET: 5; 325 TABLET ORAL at 07:04

## 2021-04-20 RX ADMIN — LIDOCAINE AND PRILOCAINE: 25; 25 CREAM TOPICAL at 07:04

## 2021-04-26 ENCOUNTER — HOSPITAL ENCOUNTER (OUTPATIENT)
Dept: RADIOLOGY | Facility: HOSPITAL | Age: 56
Discharge: HOME | End: 2021-04-26
Attending: NURSE PRACTITIONER
Payer: MEDICARE

## 2021-04-26 ENCOUNTER — CLINICAL SUPPORT (OUTPATIENT)
Dept: CARDIOLOGY | Facility: HOSPITAL | Age: 56
End: 2021-04-26
Attending: INTERNAL MEDICINE
Payer: MEDICARE

## 2021-04-26 DIAGNOSIS — C64.2 METASTATIC RENAL CELL CARCINOMA, LEFT: ICD-10-CM

## 2021-04-26 DIAGNOSIS — C79.89 METASTATIC RENAL CELL CARCINOMA TO INTRA-ABDOMINAL SITE: ICD-10-CM

## 2021-04-26 DIAGNOSIS — C64.9 METASTATIC RENAL CELL CARCINOMA TO INTRA-ABDOMINAL SITE: ICD-10-CM

## 2021-04-26 DIAGNOSIS — Z01.812 ENCOUNTER FOR PRE-OPERATIVE LABORATORY TESTING: ICD-10-CM

## 2021-04-26 DIAGNOSIS — I42.8 NICM (NONISCHEMIC CARDIOMYOPATHY): ICD-10-CM

## 2021-04-26 PROCEDURE — 71250 CT CHEST WITHOUT CONTRAST: ICD-10-PCS | Mod: 26,,, | Performed by: RADIOLOGY

## 2021-04-26 PROCEDURE — 93260 PRGRMG DEV EVAL IMPLTBL SYS: CPT

## 2021-04-26 PROCEDURE — 93260 PRGRMG DEV EVAL IMPLTBL SYS: CPT | Mod: 26,,, | Performed by: INTERNAL MEDICINE

## 2021-04-26 PROCEDURE — 71250 CT THORAX DX C-: CPT | Mod: TC

## 2021-04-26 PROCEDURE — 93260 CARDIAC DEVICE CHECK - IN CLINIC & HOSPITAL: ICD-10-PCS | Mod: 26,,, | Performed by: INTERNAL MEDICINE

## 2021-04-26 PROCEDURE — 71250 CT THORAX DX C-: CPT | Mod: 26,,, | Performed by: RADIOLOGY

## 2021-05-03 ENCOUNTER — LAB VISIT (OUTPATIENT)
Dept: LAB | Facility: HOSPITAL | Age: 56
End: 2021-05-03
Attending: NURSE PRACTITIONER
Payer: MEDICARE

## 2021-05-03 DIAGNOSIS — C64.2 METASTATIC RENAL CELL CARCINOMA, LEFT: ICD-10-CM

## 2021-05-03 DIAGNOSIS — C64.9 METASTATIC RENAL CELL CARCINOMA TO INTRA-ABDOMINAL SITE: ICD-10-CM

## 2021-05-03 DIAGNOSIS — C79.89 METASTATIC RENAL CELL CARCINOMA TO INTRA-ABDOMINAL SITE: ICD-10-CM

## 2021-05-03 LAB
ERYTHROCYTE [DISTWIDTH] IN BLOOD BY AUTOMATED COUNT: 16.9 % (ref 11.5–14.5)
HCT VFR BLD AUTO: 28.8 % (ref 37–48.5)
HGB BLD-MCNC: 9.3 G/DL (ref 12–16)
IMM GRANULOCYTES # BLD AUTO: 0.05 K/UL (ref 0–0.04)
MCH RBC QN AUTO: 35.4 PG (ref 27–31)
MCHC RBC AUTO-ENTMCNC: 32.3 G/DL (ref 32–36)
MCV RBC AUTO: 110 FL (ref 82–98)
NEUTROPHILS # BLD AUTO: 3.5 K/UL (ref 1.8–7.7)
PLATELET # BLD AUTO: 219 K/UL (ref 150–450)
PMV BLD AUTO: 11.2 FL (ref 9.2–12.9)
RBC # BLD AUTO: 2.63 M/UL (ref 4–5.4)
WBC # BLD AUTO: 6.19 K/UL (ref 3.9–12.7)

## 2021-05-03 PROCEDURE — 36415 COLL VENOUS BLD VENIPUNCTURE: CPT | Mod: PO | Performed by: NURSE PRACTITIONER

## 2021-05-03 PROCEDURE — 80053 COMPREHEN METABOLIC PANEL: CPT | Performed by: NURSE PRACTITIONER

## 2021-05-03 PROCEDURE — 85027 COMPLETE CBC AUTOMATED: CPT | Performed by: NURSE PRACTITIONER

## 2021-05-04 LAB
ALBUMIN SERPL BCP-MCNC: 3 G/DL (ref 3.5–5.2)
ALP SERPL-CCNC: 333 U/L (ref 55–135)
ALT SERPL W/O P-5'-P-CCNC: 41 U/L (ref 10–44)
ANION GAP SERPL CALC-SCNC: 18 MMOL/L (ref 8–16)
AST SERPL-CCNC: 95 U/L (ref 10–40)
BILIRUB SERPL-MCNC: 0.8 MG/DL (ref 0.1–1)
BUN SERPL-MCNC: 67 MG/DL (ref 6–20)
CALCIUM SERPL-MCNC: 7.6 MG/DL (ref 8.7–10.5)
CHLORIDE SERPL-SCNC: 100 MMOL/L (ref 95–110)
CO2 SERPL-SCNC: 24 MMOL/L (ref 23–29)
CREAT SERPL-MCNC: 7.7 MG/DL (ref 0.5–1.4)
EST. GFR  (AFRICAN AMERICAN): 6.2 ML/MIN/1.73 M^2
EST. GFR  (NON AFRICAN AMERICAN): 5.4 ML/MIN/1.73 M^2
GLUCOSE SERPL-MCNC: 99 MG/DL (ref 70–110)
POTASSIUM SERPL-SCNC: 4.6 MMOL/L (ref 3.5–5.1)
PROT SERPL-MCNC: 6.6 G/DL (ref 6–8.4)
SODIUM SERPL-SCNC: 142 MMOL/L (ref 136–145)

## 2021-05-05 ENCOUNTER — TELEPHONE (OUTPATIENT)
Dept: HEMATOLOGY/ONCOLOGY | Facility: CLINIC | Age: 56
End: 2021-05-05

## 2021-05-05 ENCOUNTER — OFFICE VISIT (OUTPATIENT)
Dept: HEMATOLOGY/ONCOLOGY | Facility: CLINIC | Age: 56
End: 2021-05-05
Payer: MEDICARE

## 2021-05-05 VITALS
BODY MASS INDEX: 21.91 KG/M2 | WEIGHT: 128.31 LBS | DIASTOLIC BLOOD PRESSURE: 70 MMHG | TEMPERATURE: 97 F | HEIGHT: 64 IN | SYSTOLIC BLOOD PRESSURE: 147 MMHG | HEART RATE: 59 BPM | RESPIRATION RATE: 16 BRPM | OXYGEN SATURATION: 96 %

## 2021-05-05 DIAGNOSIS — C64.2 METASTATIC RENAL CELL CARCINOMA, LEFT: Primary | ICD-10-CM

## 2021-05-05 DIAGNOSIS — C79.89 METASTATIC RENAL CELL CARCINOMA TO INTRA-ABDOMINAL SITE: ICD-10-CM

## 2021-05-05 DIAGNOSIS — C64.9 METASTATIC RENAL CELL CARCINOMA TO INTRA-ABDOMINAL SITE: ICD-10-CM

## 2021-05-05 PROCEDURE — 99214 PR OFFICE/OUTPT VISIT, EST, LEVL IV, 30-39 MIN: ICD-10-PCS | Mod: S$PBB,,, | Performed by: INTERNAL MEDICINE

## 2021-05-05 PROCEDURE — 99213 OFFICE O/P EST LOW 20 MIN: CPT | Mod: PBBFAC | Performed by: INTERNAL MEDICINE

## 2021-05-05 PROCEDURE — 99214 OFFICE O/P EST MOD 30 MIN: CPT | Mod: S$PBB,,, | Performed by: INTERNAL MEDICINE

## 2021-05-05 PROCEDURE — 99999 PR PBB SHADOW E&M-EST. PATIENT-LVL III: CPT | Mod: PBBFAC,,, | Performed by: INTERNAL MEDICINE

## 2021-05-05 PROCEDURE — 99999 PR PBB SHADOW E&M-EST. PATIENT-LVL III: ICD-10-PCS | Mod: PBBFAC,,, | Performed by: INTERNAL MEDICINE

## 2021-05-05 RX ORDER — ONDANSETRON HYDROCHLORIDE 8 MG/1
8 TABLET, FILM COATED ORAL EVERY 8 HOURS PRN
Qty: 30 TABLET | Refills: 2 | Status: SHIPPED | OUTPATIENT
Start: 2021-05-05 | End: 2022-05-05

## 2021-05-06 DIAGNOSIS — C79.89 METASTATIC RENAL CELL CARCINOMA TO INTRA-ABDOMINAL SITE: Primary | ICD-10-CM

## 2021-05-06 DIAGNOSIS — C64.9 METASTATIC RENAL CELL CARCINOMA TO INTRA-ABDOMINAL SITE: Primary | ICD-10-CM

## 2021-05-11 PROBLEM — Z12.11 COLON CANCER SCREENING: Status: RESOLVED | Noted: 2021-03-12 | Resolved: 2021-05-11

## 2021-05-11 PROBLEM — I36.1 NONRHEUMATIC TRICUSPID VALVE REGURGITATION: Status: RESOLVED | Noted: 2021-02-05 | Resolved: 2021-05-11

## 2021-05-11 PROBLEM — R05.9 COUGH: Status: RESOLVED | Noted: 2020-03-14 | Resolved: 2021-05-11

## 2021-05-11 PROBLEM — I42.8 NICM (NONISCHEMIC CARDIOMYOPATHY): Status: RESOLVED | Noted: 2021-04-19 | Resolved: 2021-05-11

## 2021-05-17 ENCOUNTER — OFFICE VISIT (OUTPATIENT)
Dept: FAMILY MEDICINE | Facility: CLINIC | Age: 56
End: 2021-05-17
Payer: MEDICARE

## 2021-05-17 VITALS
TEMPERATURE: 98 F | OXYGEN SATURATION: 96 % | DIASTOLIC BLOOD PRESSURE: 62 MMHG | SYSTOLIC BLOOD PRESSURE: 162 MMHG | HEIGHT: 64 IN | WEIGHT: 128.31 LBS | BODY MASS INDEX: 21.91 KG/M2 | HEART RATE: 71 BPM

## 2021-05-17 DIAGNOSIS — N25.81 HYPERPARATHYROIDISM, SECONDARY RENAL: ICD-10-CM

## 2021-05-17 DIAGNOSIS — Z99.2 ESRD (END STAGE RENAL DISEASE) ON DIALYSIS: ICD-10-CM

## 2021-05-17 DIAGNOSIS — I51.89 COMBINED SYSTOLIC AND DIASTOLIC CARDIAC DYSFUNCTION: ICD-10-CM

## 2021-05-17 DIAGNOSIS — I10 ESSENTIAL HYPERTENSION: ICD-10-CM

## 2021-05-17 DIAGNOSIS — I42.8 CARDIOMYOPATHY, NONISCHEMIC: ICD-10-CM

## 2021-05-17 DIAGNOSIS — Z23 NEED FOR SHINGLES VACCINE: ICD-10-CM

## 2021-05-17 DIAGNOSIS — N18.6 ANEMIA IN ESRD (END-STAGE RENAL DISEASE): ICD-10-CM

## 2021-05-17 DIAGNOSIS — N18.6 ESRD (END STAGE RENAL DISEASE) ON DIALYSIS: ICD-10-CM

## 2021-05-17 DIAGNOSIS — Z71.89 ADVANCED DIRECTIVES, COUNSELING/DISCUSSION: ICD-10-CM

## 2021-05-17 DIAGNOSIS — I27.20 PULMONARY HYPERTENSION: ICD-10-CM

## 2021-05-17 DIAGNOSIS — Z00.00 ROUTINE MEDICAL EXAM: Primary | ICD-10-CM

## 2021-05-17 DIAGNOSIS — C79.89 METASTATIC RENAL CELL CARCINOMA TO INTRA-ABDOMINAL SITE: ICD-10-CM

## 2021-05-17 DIAGNOSIS — C64.9 METASTATIC RENAL CELL CARCINOMA TO INTRA-ABDOMINAL SITE: ICD-10-CM

## 2021-05-17 DIAGNOSIS — D63.1 ANEMIA IN ESRD (END-STAGE RENAL DISEASE): ICD-10-CM

## 2021-05-17 DIAGNOSIS — E04.2 MULTINODULAR GOITER: ICD-10-CM

## 2021-05-17 DIAGNOSIS — K43.9 VENTRAL HERNIA WITHOUT OBSTRUCTION OR GANGRENE: ICD-10-CM

## 2021-05-17 DIAGNOSIS — I50.22 CHF (CONGESTIVE HEART FAILURE), NYHA CLASS II, CHRONIC, SYSTOLIC: ICD-10-CM

## 2021-05-17 PROCEDURE — 99999 PR PBB SHADOW E&M-EST. PATIENT-LVL V: ICD-10-PCS | Mod: PBBFAC,,, | Performed by: INTERNAL MEDICINE

## 2021-05-17 PROCEDURE — 90471 IMMUNIZATION ADMIN: CPT | Mod: PBBFAC,PO

## 2021-05-17 PROCEDURE — 99999 PR PBB SHADOW E&M-EST. PATIENT-LVL V: CPT | Mod: PBBFAC,,, | Performed by: INTERNAL MEDICINE

## 2021-05-17 PROCEDURE — 90750 HZV VACC RECOMBINANT IM: CPT | Mod: PBBFAC,PO

## 2021-05-17 PROCEDURE — 99215 OFFICE O/P EST HI 40 MIN: CPT | Mod: PBBFAC,PO | Performed by: INTERNAL MEDICINE

## 2021-05-17 PROCEDURE — 99396 PREV VISIT EST AGE 40-64: CPT | Mod: S$PBB,,, | Performed by: INTERNAL MEDICINE

## 2021-05-17 PROCEDURE — 99396 PR PREVENTIVE VISIT,EST,40-64: ICD-10-PCS | Mod: S$PBB,,, | Performed by: INTERNAL MEDICINE

## 2021-05-24 ENCOUNTER — OFFICE VISIT (OUTPATIENT)
Dept: SURGERY | Facility: CLINIC | Age: 56
End: 2021-05-24
Payer: MEDICARE

## 2021-05-24 VITALS
BODY MASS INDEX: 21.75 KG/M2 | HEART RATE: 86 BPM | HEIGHT: 64 IN | WEIGHT: 127.38 LBS | SYSTOLIC BLOOD PRESSURE: 156 MMHG | DIASTOLIC BLOOD PRESSURE: 82 MMHG

## 2021-05-24 DIAGNOSIS — K64.4 EXTERNAL HEMORRHOIDS: ICD-10-CM

## 2021-05-24 DIAGNOSIS — K64.8 HEMORRHOIDS, INTERNAL, WITH BLEEDING: Primary | ICD-10-CM

## 2021-05-24 PROCEDURE — 46600 DIAGNOSTIC ANOSCOPY SPX: CPT | Mod: S$PBB,,, | Performed by: COLON & RECTAL SURGERY

## 2021-05-24 PROCEDURE — 46600 PR DIAG2STIC A2SCOPY: ICD-10-PCS | Mod: S$PBB,,, | Performed by: COLON & RECTAL SURGERY

## 2021-05-24 PROCEDURE — 99214 OFFICE O/P EST MOD 30 MIN: CPT | Mod: PBBFAC,25 | Performed by: COLON & RECTAL SURGERY

## 2021-05-24 PROCEDURE — 46600 DIAGNOSTIC ANOSCOPY SPX: CPT | Mod: PBBFAC | Performed by: COLON & RECTAL SURGERY

## 2021-05-24 PROCEDURE — 99999 PR PBB SHADOW E&M-EST. PATIENT-LVL IV: ICD-10-PCS | Mod: PBBFAC,,, | Performed by: COLON & RECTAL SURGERY

## 2021-05-24 PROCEDURE — 99203 PR OFFICE/OUTPT VISIT, NEW, LEVL III, 30-44 MIN: ICD-10-PCS | Mod: 25,S$PBB,, | Performed by: COLON & RECTAL SURGERY

## 2021-05-24 PROCEDURE — 99999 PR PBB SHADOW E&M-EST. PATIENT-LVL IV: CPT | Mod: PBBFAC,,, | Performed by: COLON & RECTAL SURGERY

## 2021-05-24 PROCEDURE — 99203 OFFICE O/P NEW LOW 30 MIN: CPT | Mod: 25,S$PBB,, | Performed by: COLON & RECTAL SURGERY

## 2021-05-24 RX ORDER — HYDROCORTISONE ACETATE PRAMOXINE HCL 2.5; 1 G/100G; G/100G
CREAM TOPICAL 2 TIMES DAILY
Qty: 30 G | Refills: 5 | Status: SHIPPED | OUTPATIENT
Start: 2021-05-24 | End: 2021-06-03

## 2021-06-02 ENCOUNTER — TELEPHONE (OUTPATIENT)
Dept: CARDIOLOGY | Facility: HOSPITAL | Age: 56
End: 2021-06-02

## 2021-06-23 DIAGNOSIS — I12.0 HYPERTENSIVE CKD, ESRD ON DIALYSIS: Primary | ICD-10-CM

## 2021-06-23 DIAGNOSIS — N18.6 HYPERTENSIVE CKD, ESRD ON DIALYSIS: Primary | ICD-10-CM

## 2021-06-23 DIAGNOSIS — N18.6 ESRD (END STAGE RENAL DISEASE): ICD-10-CM

## 2021-06-23 DIAGNOSIS — Z99.2 HYPERTENSIVE CKD, ESRD ON DIALYSIS: Primary | ICD-10-CM

## 2021-06-23 RX ORDER — CLONIDINE 0.3 MG/24H
1 PATCH, EXTENDED RELEASE TRANSDERMAL
Qty: 4 PATCH | Refills: 11 | Status: SHIPPED | OUTPATIENT
Start: 2021-06-23 | End: 2021-10-14

## 2021-06-24 ENCOUNTER — TELEPHONE (OUTPATIENT)
Dept: FAMILY MEDICINE | Facility: CLINIC | Age: 56
End: 2021-06-24

## 2021-06-25 ENCOUNTER — HOSPITAL ENCOUNTER (OUTPATIENT)
Dept: VASCULAR SURGERY | Facility: CLINIC | Age: 56
Discharge: HOME | End: 2021-06-25
Attending: SURGERY
Payer: MEDICARE

## 2021-06-25 ENCOUNTER — OFFICE VISIT (OUTPATIENT)
Dept: VASCULAR SURGERY | Facility: CLINIC | Age: 56
End: 2021-06-25
Payer: MEDICARE

## 2021-06-25 VITALS
TEMPERATURE: 99 F | HEIGHT: 64 IN | SYSTOLIC BLOOD PRESSURE: 157 MMHG | BODY MASS INDEX: 21.07 KG/M2 | DIASTOLIC BLOOD PRESSURE: 88 MMHG | HEART RATE: 87 BPM | WEIGHT: 123.44 LBS

## 2021-06-25 DIAGNOSIS — Z99.2 ESRD (END STAGE RENAL DISEASE) ON DIALYSIS: Primary | ICD-10-CM

## 2021-06-25 DIAGNOSIS — Z99.2 ESRD (END STAGE RENAL DISEASE) ON DIALYSIS: ICD-10-CM

## 2021-06-25 DIAGNOSIS — N18.6 ESRD (END STAGE RENAL DISEASE) ON DIALYSIS: Primary | ICD-10-CM

## 2021-06-25 DIAGNOSIS — N18.6 ESRD (END STAGE RENAL DISEASE) ON DIALYSIS: ICD-10-CM

## 2021-06-25 PROCEDURE — 99213 PR OFFICE/OUTPT VISIT, EST, LEVL III, 20-29 MIN: ICD-10-PCS | Mod: S$PBB,,, | Performed by: SURGERY

## 2021-06-25 PROCEDURE — 99999 PR PBB SHADOW E&M-EST. PATIENT-LVL III: CPT | Mod: PBBFAC,,, | Performed by: SURGERY

## 2021-06-25 PROCEDURE — 99999 PR PBB SHADOW E&M-EST. PATIENT-LVL III: ICD-10-PCS | Mod: PBBFAC,,, | Performed by: SURGERY

## 2021-06-25 PROCEDURE — 99213 OFFICE O/P EST LOW 20 MIN: CPT | Mod: S$PBB,,, | Performed by: SURGERY

## 2021-06-25 PROCEDURE — 93990 PR DUPLEX HEMODIALYSIS ACCESS: ICD-10-PCS | Mod: 26,S$PBB,, | Performed by: SURGERY

## 2021-06-25 PROCEDURE — 93990 DOPPLER FLOW TESTING: CPT | Mod: 26,S$PBB,, | Performed by: SURGERY

## 2021-06-25 PROCEDURE — 99213 OFFICE O/P EST LOW 20 MIN: CPT | Mod: PBBFAC,25 | Performed by: SURGERY

## 2021-06-25 PROCEDURE — 93990 DOPPLER FLOW TESTING: CPT | Mod: PBBFAC | Performed by: SURGERY

## 2021-06-29 ENCOUNTER — TELEPHONE (OUTPATIENT)
Dept: FAMILY MEDICINE | Facility: CLINIC | Age: 56
End: 2021-06-29

## 2021-06-30 ENCOUNTER — LAB VISIT (OUTPATIENT)
Dept: LAB | Facility: HOSPITAL | Age: 56
End: 2021-06-30
Attending: NURSE PRACTITIONER
Payer: MEDICARE

## 2021-06-30 DIAGNOSIS — C79.89 METASTATIC RENAL CELL CARCINOMA TO INTRA-ABDOMINAL SITE: ICD-10-CM

## 2021-06-30 DIAGNOSIS — C64.9 METASTATIC RENAL CELL CARCINOMA TO INTRA-ABDOMINAL SITE: ICD-10-CM

## 2021-06-30 LAB
ALBUMIN SERPL BCP-MCNC: 3.1 G/DL (ref 3.5–5.2)
ALP SERPL-CCNC: 233 U/L (ref 55–135)
ALT SERPL W/O P-5'-P-CCNC: 45 U/L (ref 10–44)
ANION GAP SERPL CALC-SCNC: 13 MMOL/L (ref 8–16)
AST SERPL-CCNC: 67 U/L (ref 10–40)
BILIRUB SERPL-MCNC: 0.9 MG/DL (ref 0.1–1)
BUN SERPL-MCNC: 44 MG/DL (ref 6–20)
CALCIUM SERPL-MCNC: 7.6 MG/DL (ref 8.7–10.5)
CHLORIDE SERPL-SCNC: 104 MMOL/L (ref 95–110)
CO2 SERPL-SCNC: 26 MMOL/L (ref 23–29)
CREAT SERPL-MCNC: 7.6 MG/DL (ref 0.5–1.4)
ERYTHROCYTE [DISTWIDTH] IN BLOOD BY AUTOMATED COUNT: 16.8 % (ref 11.5–14.5)
EST. GFR  (AFRICAN AMERICAN): 6 ML/MIN/1.73 M^2
EST. GFR  (NON AFRICAN AMERICAN): 5 ML/MIN/1.73 M^2
GLUCOSE SERPL-MCNC: 104 MG/DL (ref 70–110)
HCT VFR BLD AUTO: 36.7 % (ref 37–48.5)
HGB BLD-MCNC: 11.9 G/DL (ref 12–16)
IMM GRANULOCYTES # BLD AUTO: 0.01 K/UL (ref 0–0.04)
MCH RBC QN AUTO: 34.7 PG (ref 27–31)
MCHC RBC AUTO-ENTMCNC: 32.4 G/DL (ref 32–36)
MCV RBC AUTO: 107 FL (ref 82–98)
NEUTROPHILS # BLD AUTO: 1.7 K/UL (ref 1.8–7.7)
PLATELET # BLD AUTO: 164 K/UL (ref 150–450)
PMV BLD AUTO: 10.7 FL (ref 9.2–12.9)
POTASSIUM SERPL-SCNC: 4.2 MMOL/L (ref 3.5–5.1)
PROT SERPL-MCNC: 6.8 G/DL (ref 6–8.4)
RBC # BLD AUTO: 3.43 M/UL (ref 4–5.4)
SODIUM SERPL-SCNC: 143 MMOL/L (ref 136–145)
WBC # BLD AUTO: 3.58 K/UL (ref 3.9–12.7)

## 2021-06-30 PROCEDURE — 36415 COLL VENOUS BLD VENIPUNCTURE: CPT | Mod: PO | Performed by: NURSE PRACTITIONER

## 2021-06-30 PROCEDURE — 80053 COMPREHEN METABOLIC PANEL: CPT | Performed by: NURSE PRACTITIONER

## 2021-06-30 PROCEDURE — 85027 COMPLETE CBC AUTOMATED: CPT | Performed by: NURSE PRACTITIONER

## 2021-07-02 ENCOUNTER — TELEPHONE (OUTPATIENT)
Dept: HEMATOLOGY/ONCOLOGY | Facility: CLINIC | Age: 56
End: 2021-07-02

## 2021-07-02 ENCOUNTER — OFFICE VISIT (OUTPATIENT)
Dept: HEMATOLOGY/ONCOLOGY | Facility: CLINIC | Age: 56
End: 2021-07-02
Payer: MEDICARE

## 2021-07-02 VITALS
SYSTOLIC BLOOD PRESSURE: 122 MMHG | HEIGHT: 64 IN | HEART RATE: 62 BPM | BODY MASS INDEX: 21.76 KG/M2 | OXYGEN SATURATION: 95 % | WEIGHT: 127.44 LBS | TEMPERATURE: 98 F | RESPIRATION RATE: 14 BRPM | DIASTOLIC BLOOD PRESSURE: 71 MMHG

## 2021-07-02 DIAGNOSIS — I12.9 HYPERTENSION, RENAL DISEASE: ICD-10-CM

## 2021-07-02 DIAGNOSIS — C64.9 METASTATIC RENAL CELL CARCINOMA TO INTRA-ABDOMINAL SITE: Primary | ICD-10-CM

## 2021-07-02 DIAGNOSIS — Z90.5 S/P NEPHRECTOMY: ICD-10-CM

## 2021-07-02 DIAGNOSIS — C79.89 METASTATIC RENAL CELL CARCINOMA TO INTRA-ABDOMINAL SITE: Primary | ICD-10-CM

## 2021-07-02 DIAGNOSIS — N18.6 ANEMIA DUE TO END STAGE RENAL DISEASE: ICD-10-CM

## 2021-07-02 DIAGNOSIS — N18.6 ESRD (END STAGE RENAL DISEASE) ON DIALYSIS: ICD-10-CM

## 2021-07-02 DIAGNOSIS — C64.2 METASTATIC RENAL CELL CARCINOMA, LEFT: ICD-10-CM

## 2021-07-02 DIAGNOSIS — D63.1 ANEMIA DUE TO END STAGE RENAL DISEASE: ICD-10-CM

## 2021-07-02 DIAGNOSIS — R74.01 TRANSAMINITIS: ICD-10-CM

## 2021-07-02 DIAGNOSIS — I51.89 COMBINED SYSTOLIC AND DIASTOLIC CARDIAC DYSFUNCTION: ICD-10-CM

## 2021-07-02 DIAGNOSIS — Z99.2 ESRD (END STAGE RENAL DISEASE) ON DIALYSIS: ICD-10-CM

## 2021-07-02 PROCEDURE — 99214 OFFICE O/P EST MOD 30 MIN: CPT | Mod: S$PBB,,, | Performed by: NURSE PRACTITIONER

## 2021-07-02 PROCEDURE — 99999 PR PBB SHADOW E&M-EST. PATIENT-LVL IV: CPT | Mod: PBBFAC,,, | Performed by: NURSE PRACTITIONER

## 2021-07-02 PROCEDURE — 99214 OFFICE O/P EST MOD 30 MIN: CPT | Mod: PBBFAC | Performed by: NURSE PRACTITIONER

## 2021-07-02 PROCEDURE — 99214 PR OFFICE/OUTPT VISIT, EST, LEVL IV, 30-39 MIN: ICD-10-PCS | Mod: S$PBB,,, | Performed by: NURSE PRACTITIONER

## 2021-07-02 PROCEDURE — 99999 PR PBB SHADOW E&M-EST. PATIENT-LVL IV: ICD-10-PCS | Mod: PBBFAC,,, | Performed by: NURSE PRACTITIONER

## 2021-07-15 PROBLEM — Z95.810 ICD (IMPLANTABLE CARDIOVERTER-DEFIBRILLATOR) IN PLACE: Status: ACTIVE | Noted: 2021-07-15

## 2021-07-19 ENCOUNTER — CLINICAL SUPPORT (OUTPATIENT)
Dept: FAMILY MEDICINE | Facility: CLINIC | Age: 56
End: 2021-07-19
Payer: MEDICARE

## 2021-07-19 ENCOUNTER — PATIENT OUTREACH (OUTPATIENT)
Dept: ADMINISTRATIVE | Facility: OTHER | Age: 56
End: 2021-07-19

## 2021-07-19 DIAGNOSIS — Z23 NEED FOR SHINGLES VACCINE: Primary | ICD-10-CM

## 2021-07-19 PROCEDURE — 90750 HZV VACC RECOMBINANT IM: CPT | Mod: PBBFAC,PO

## 2021-07-19 PROCEDURE — 99499 UNLISTED E&M SERVICE: CPT | Mod: S$PBB,,, | Performed by: INTERNAL MEDICINE

## 2021-07-19 PROCEDURE — 99499 NO LOS: ICD-10-PCS | Mod: S$PBB,,, | Performed by: INTERNAL MEDICINE

## 2021-07-19 PROCEDURE — 90471 IMMUNIZATION ADMIN: CPT | Mod: PBBFAC,PO

## 2021-07-20 ENCOUNTER — CLINICAL SUPPORT (OUTPATIENT)
Dept: CARDIOLOGY | Facility: HOSPITAL | Age: 56
End: 2021-07-20
Attending: INTERNAL MEDICINE
Payer: MEDICARE

## 2021-07-20 ENCOUNTER — OFFICE VISIT (OUTPATIENT)
Dept: ELECTROPHYSIOLOGY | Facility: CLINIC | Age: 56
End: 2021-07-20
Payer: MEDICARE

## 2021-07-20 ENCOUNTER — HOSPITAL ENCOUNTER (OUTPATIENT)
Dept: CARDIOLOGY | Facility: CLINIC | Age: 56
Discharge: HOME | End: 2021-07-20
Payer: MEDICARE

## 2021-07-20 VITALS
BODY MASS INDEX: 21.49 KG/M2 | DIASTOLIC BLOOD PRESSURE: 73 MMHG | HEIGHT: 64 IN | WEIGHT: 125.88 LBS | HEART RATE: 63 BPM | SYSTOLIC BLOOD PRESSURE: 156 MMHG

## 2021-07-20 DIAGNOSIS — I10 ESSENTIAL HYPERTENSION: ICD-10-CM

## 2021-07-20 DIAGNOSIS — I50.22 CHF (CONGESTIVE HEART FAILURE), NYHA CLASS II, CHRONIC, SYSTOLIC: ICD-10-CM

## 2021-07-20 DIAGNOSIS — Z95.810 ICD (IMPLANTABLE CARDIOVERTER-DEFIBRILLATOR) IN PLACE: Primary | ICD-10-CM

## 2021-07-20 DIAGNOSIS — I42.8 CARDIOMYOPATHY, NONISCHEMIC: ICD-10-CM

## 2021-07-20 DIAGNOSIS — Z95.810 AUTOMATIC IMPLANTABLE CARDIAC DEFIBRILLATOR IN SITU: ICD-10-CM

## 2021-07-20 PROCEDURE — 93010 ELECTROCARDIOGRAM REPORT: CPT | Mod: S$PBB,,, | Performed by: INTERNAL MEDICINE

## 2021-07-20 PROCEDURE — 93010 RHYTHM STRIP: ICD-10-PCS | Mod: S$PBB,,, | Performed by: INTERNAL MEDICINE

## 2021-07-20 PROCEDURE — 99214 OFFICE O/P EST MOD 30 MIN: CPT | Mod: S$PBB,,, | Performed by: NURSE PRACTITIONER

## 2021-07-20 PROCEDURE — 93260 PRGRMG DEV EVAL IMPLTBL SYS: CPT | Mod: 26,,, | Performed by: INTERNAL MEDICINE

## 2021-07-20 PROCEDURE — 93005 ELECTROCARDIOGRAM TRACING: CPT | Mod: PBBFAC | Performed by: INTERNAL MEDICINE

## 2021-07-20 PROCEDURE — 99214 PR OFFICE/OUTPT VISIT, EST, LEVL IV, 30-39 MIN: ICD-10-PCS | Mod: S$PBB,,, | Performed by: NURSE PRACTITIONER

## 2021-07-20 PROCEDURE — 99999 PR PBB SHADOW E&M-EST. PATIENT-LVL IV: ICD-10-PCS | Mod: PBBFAC,,, | Performed by: NURSE PRACTITIONER

## 2021-07-20 PROCEDURE — 93260 CARDIAC DEVICE CHECK - IN CLINIC & HOSPITAL: ICD-10-PCS | Mod: 26,,, | Performed by: INTERNAL MEDICINE

## 2021-07-20 PROCEDURE — 99214 OFFICE O/P EST MOD 30 MIN: CPT | Mod: PBBFAC | Performed by: NURSE PRACTITIONER

## 2021-07-20 PROCEDURE — 99999 PR PBB SHADOW E&M-EST. PATIENT-LVL IV: CPT | Mod: PBBFAC,,, | Performed by: NURSE PRACTITIONER

## 2021-07-20 PROCEDURE — 93260 PRGRMG DEV EVAL IMPLTBL SYS: CPT

## 2021-07-28 ENCOUNTER — TELEPHONE (OUTPATIENT)
Dept: ELECTROPHYSIOLOGY | Facility: CLINIC | Age: 56
End: 2021-07-28

## 2021-07-29 ENCOUNTER — TELEPHONE (OUTPATIENT)
Dept: ELECTROPHYSIOLOGY | Facility: CLINIC | Age: 56
End: 2021-07-29

## 2021-08-27 ENCOUNTER — TELEPHONE (OUTPATIENT)
Dept: HEMATOLOGY/ONCOLOGY | Facility: CLINIC | Age: 56
End: 2021-08-27

## 2021-09-02 ENCOUNTER — TELEPHONE (OUTPATIENT)
Dept: HEMATOLOGY/ONCOLOGY | Facility: CLINIC | Age: 56
End: 2021-09-02

## 2021-09-08 ENCOUNTER — PATIENT MESSAGE (OUTPATIENT)
Dept: HEMATOLOGY/ONCOLOGY | Facility: CLINIC | Age: 56
End: 2021-09-08

## 2021-09-08 ENCOUNTER — TELEPHONE (OUTPATIENT)
Dept: HEMATOLOGY/ONCOLOGY | Facility: CLINIC | Age: 56
End: 2021-09-08

## 2021-09-13 ENCOUNTER — HOSPITAL ENCOUNTER (OUTPATIENT)
Dept: RADIOLOGY | Facility: HOSPITAL | Age: 56
Discharge: HOME | End: 2021-09-13
Attending: NURSE PRACTITIONER
Payer: MEDICARE

## 2021-09-13 DIAGNOSIS — C64.9 METASTATIC RENAL CELL CARCINOMA TO INTRA-ABDOMINAL SITE: ICD-10-CM

## 2021-09-13 DIAGNOSIS — C79.89 METASTATIC RENAL CELL CARCINOMA TO INTRA-ABDOMINAL SITE: ICD-10-CM

## 2021-09-13 PROCEDURE — 71250 CT THORAX DX C-: CPT | Mod: TC

## 2021-09-13 PROCEDURE — 71250 CT CHEST ABDOMEN PELVIS WITHOUT CONTRAST(XPD): ICD-10-PCS | Mod: 26,,, | Performed by: RADIOLOGY

## 2021-09-13 PROCEDURE — 74176 CT ABD & PELVIS W/O CONTRAST: CPT | Mod: TC

## 2021-09-13 PROCEDURE — 74176 CT CHEST ABDOMEN PELVIS WITHOUT CONTRAST(XPD): ICD-10-PCS | Mod: 26,,, | Performed by: RADIOLOGY

## 2021-09-13 PROCEDURE — 71250 CT THORAX DX C-: CPT | Mod: 26,,, | Performed by: RADIOLOGY

## 2021-09-13 PROCEDURE — 74176 CT ABD & PELVIS W/O CONTRAST: CPT | Mod: 26,,, | Performed by: RADIOLOGY

## 2021-09-20 ENCOUNTER — OFFICE VISIT (OUTPATIENT)
Dept: HEMATOLOGY/ONCOLOGY | Facility: CLINIC | Age: 56
End: 2021-09-20
Payer: COMMERCIAL

## 2021-09-20 VITALS
HEIGHT: 64 IN | HEART RATE: 78 BPM | DIASTOLIC BLOOD PRESSURE: 93 MMHG | WEIGHT: 126.88 LBS | TEMPERATURE: 98 F | BODY MASS INDEX: 21.66 KG/M2 | RESPIRATION RATE: 15 BRPM | SYSTOLIC BLOOD PRESSURE: 185 MMHG

## 2021-09-20 DIAGNOSIS — I12.9 HYPERTENSION, RENAL DISEASE: ICD-10-CM

## 2021-09-20 DIAGNOSIS — C64.9 METASTATIC RENAL CELL CARCINOMA TO INTRA-ABDOMINAL SITE: Primary | ICD-10-CM

## 2021-09-20 DIAGNOSIS — N18.6 ESRD (END STAGE RENAL DISEASE) ON DIALYSIS: ICD-10-CM

## 2021-09-20 DIAGNOSIS — N18.6 ANEMIA DUE TO END STAGE RENAL DISEASE: ICD-10-CM

## 2021-09-20 DIAGNOSIS — T45.1X5A CHEMOTHERAPY-INDUCED THROMBOCYTOPENIA: ICD-10-CM

## 2021-09-20 DIAGNOSIS — D69.59 CHEMOTHERAPY-INDUCED THROMBOCYTOPENIA: ICD-10-CM

## 2021-09-20 DIAGNOSIS — I51.89 COMBINED SYSTOLIC AND DIASTOLIC CARDIAC DYSFUNCTION: ICD-10-CM

## 2021-09-20 DIAGNOSIS — D63.1 ANEMIA DUE TO END STAGE RENAL DISEASE: ICD-10-CM

## 2021-09-20 DIAGNOSIS — Z90.5 S/P NEPHRECTOMY: ICD-10-CM

## 2021-09-20 DIAGNOSIS — E03.9 HYPOTHYROIDISM, UNSPECIFIED TYPE: ICD-10-CM

## 2021-09-20 DIAGNOSIS — C79.89 METASTATIC RENAL CELL CARCINOMA TO INTRA-ABDOMINAL SITE: Primary | ICD-10-CM

## 2021-09-20 DIAGNOSIS — C64.2 METASTATIC RENAL CELL CARCINOMA, LEFT: ICD-10-CM

## 2021-09-20 DIAGNOSIS — Z99.2 ESRD (END STAGE RENAL DISEASE) ON DIALYSIS: ICD-10-CM

## 2021-09-20 PROCEDURE — 3080F PR MOST RECENT DIASTOLIC BLOOD PRESSURE >= 90 MM HG: ICD-10-PCS | Mod: CPTII,S$GLB,, | Performed by: INTERNAL MEDICINE

## 2021-09-20 PROCEDURE — 1159F MED LIST DOCD IN RCRD: CPT | Mod: CPTII,S$GLB,, | Performed by: INTERNAL MEDICINE

## 2021-09-20 PROCEDURE — 3008F PR BODY MASS INDEX (BMI) DOCUMENTED: ICD-10-PCS | Mod: CPTII,S$GLB,, | Performed by: INTERNAL MEDICINE

## 2021-09-20 PROCEDURE — 99999 PR PBB SHADOW E&M-EST. PATIENT-LVL IV: CPT | Mod: PBBFAC,,, | Performed by: INTERNAL MEDICINE

## 2021-09-20 PROCEDURE — 99999 PR PBB SHADOW E&M-EST. PATIENT-LVL IV: ICD-10-PCS | Mod: PBBFAC,,, | Performed by: INTERNAL MEDICINE

## 2021-09-20 PROCEDURE — 1159F PR MEDICATION LIST DOCUMENTED IN MEDICAL RECORD: ICD-10-PCS | Mod: CPTII,S$GLB,, | Performed by: INTERNAL MEDICINE

## 2021-09-20 PROCEDURE — 3077F SYST BP >= 140 MM HG: CPT | Mod: CPTII,S$GLB,, | Performed by: INTERNAL MEDICINE

## 2021-09-20 PROCEDURE — 3044F PR MOST RECENT HEMOGLOBIN A1C LEVEL <7.0%: ICD-10-PCS | Mod: CPTII,S$GLB,, | Performed by: INTERNAL MEDICINE

## 2021-09-20 PROCEDURE — 99214 PR OFFICE/OUTPT VISIT, EST, LEVL IV, 30-39 MIN: ICD-10-PCS | Mod: S$GLB,,, | Performed by: INTERNAL MEDICINE

## 2021-09-20 PROCEDURE — 4010F PR ACE/ARB THEARPY RXD/TAKEN: ICD-10-PCS | Mod: CPTII,S$GLB,, | Performed by: INTERNAL MEDICINE

## 2021-09-20 PROCEDURE — 3080F DIAST BP >= 90 MM HG: CPT | Mod: CPTII,S$GLB,, | Performed by: INTERNAL MEDICINE

## 2021-09-20 PROCEDURE — 4010F ACE/ARB THERAPY RXD/TAKEN: CPT | Mod: CPTII,S$GLB,, | Performed by: INTERNAL MEDICINE

## 2021-09-20 PROCEDURE — 3008F BODY MASS INDEX DOCD: CPT | Mod: CPTII,S$GLB,, | Performed by: INTERNAL MEDICINE

## 2021-09-20 PROCEDURE — 3044F HG A1C LEVEL LT 7.0%: CPT | Mod: CPTII,S$GLB,, | Performed by: INTERNAL MEDICINE

## 2021-09-20 PROCEDURE — 3077F PR MOST RECENT SYSTOLIC BLOOD PRESSURE >= 140 MM HG: ICD-10-PCS | Mod: CPTII,S$GLB,, | Performed by: INTERNAL MEDICINE

## 2021-09-20 PROCEDURE — 99214 OFFICE O/P EST MOD 30 MIN: CPT | Mod: S$GLB,,, | Performed by: INTERNAL MEDICINE

## 2021-10-13 ENCOUNTER — TELEPHONE (OUTPATIENT)
Dept: FAMILY MEDICINE | Facility: CLINIC | Age: 56
End: 2021-10-13

## 2021-10-13 DIAGNOSIS — Z12.31 ENCOUNTER FOR SCREENING MAMMOGRAM FOR BREAST CANCER: Primary | ICD-10-CM

## 2021-10-14 DIAGNOSIS — I10 ESSENTIAL HYPERTENSION: ICD-10-CM

## 2021-10-14 DIAGNOSIS — N18.6 ESRD (END STAGE RENAL DISEASE): ICD-10-CM

## 2021-10-14 RX ORDER — CLONIDINE 0.3 MG/24H
1 PATCH, EXTENDED RELEASE TRANSDERMAL
Qty: 4 PATCH | Refills: 11 | Status: SHIPPED | OUTPATIENT
Start: 2021-10-14 | End: 2022-10-26 | Stop reason: SDUPTHER

## 2021-10-14 RX ORDER — AMLODIPINE BESYLATE 5 MG/1
TABLET ORAL
Qty: 90 TABLET | Refills: 3 | Status: SHIPPED | OUTPATIENT
Start: 2021-10-14 | End: 2022-06-29

## 2021-10-22 ENCOUNTER — OFFICE VISIT (OUTPATIENT)
Dept: OTOLARYNGOLOGY | Facility: CLINIC | Age: 56
End: 2021-10-22
Payer: MEDICARE

## 2021-10-22 ENCOUNTER — TELEPHONE (OUTPATIENT)
Dept: FAMILY MEDICINE | Facility: CLINIC | Age: 56
End: 2021-10-22

## 2021-10-22 ENCOUNTER — HOSPITAL ENCOUNTER (EMERGENCY)
Facility: HOSPITAL | Age: 56
Discharge: HOME | End: 2021-10-22
Attending: INTERNAL MEDICINE
Payer: MEDICARE

## 2021-10-22 ENCOUNTER — OFFICE VISIT (OUTPATIENT)
Dept: FAMILY MEDICINE | Facility: CLINIC | Age: 56
End: 2021-10-22
Payer: MEDICARE

## 2021-10-22 VITALS
TEMPERATURE: 98 F | BODY MASS INDEX: 21.24 KG/M2 | WEIGHT: 124.44 LBS | HEIGHT: 64 IN | HEART RATE: 80 BPM | SYSTOLIC BLOOD PRESSURE: 172 MMHG | OXYGEN SATURATION: 94 % | DIASTOLIC BLOOD PRESSURE: 86 MMHG

## 2021-10-22 VITALS
WEIGHT: 124.31 LBS | DIASTOLIC BLOOD PRESSURE: 90 MMHG | SYSTOLIC BLOOD PRESSURE: 160 MMHG | TEMPERATURE: 98 F | RESPIRATION RATE: 16 BRPM | BODY MASS INDEX: 21.34 KG/M2

## 2021-10-22 VITALS
HEART RATE: 83 BPM | RESPIRATION RATE: 20 BRPM | DIASTOLIC BLOOD PRESSURE: 96 MMHG | BODY MASS INDEX: 21.46 KG/M2 | WEIGHT: 125 LBS | SYSTOLIC BLOOD PRESSURE: 175 MMHG | OXYGEN SATURATION: 95 %

## 2021-10-22 DIAGNOSIS — F43.0 ACUTE STRESS REACTION: Primary | ICD-10-CM

## 2021-10-22 DIAGNOSIS — I10 HYPERTENSION, UNSPECIFIED TYPE: ICD-10-CM

## 2021-10-22 DIAGNOSIS — R04.0 RIGHT-SIDED EPISTAXIS: ICD-10-CM

## 2021-10-22 DIAGNOSIS — R04.0 LEFT-SIDED EPISTAXIS: ICD-10-CM

## 2021-10-22 DIAGNOSIS — R04.0 EPISTAXIS: Primary | ICD-10-CM

## 2021-10-22 DIAGNOSIS — R04.0 RIGHT-SIDED EPISTAXIS: Primary | ICD-10-CM

## 2021-10-22 LAB
ALBUMIN SERPL-MCNC: 2.9 G/DL (ref 3.3–5.5)
ALP SERPL-CCNC: 217 U/L (ref 42–141)
BILIRUB SERPL-MCNC: 1.1 MG/DL (ref 0.2–1.6)
BUN SERPL-MCNC: 23 MG/DL (ref 7–22)
CALCIUM SERPL-MCNC: 9.4 MG/DL (ref 8–10.3)
CHLORIDE SERPL-SCNC: 103 MMOL/L (ref 98–108)
CREAT SERPL-MCNC: 5.9 MG/DL (ref 0.6–1.2)
GLUCOSE SERPL-MCNC: 114 MG/DL (ref 73–118)
POC ALT (SGPT): 25 U/L (ref 10–47)
POC AST (SGOT): 44 U/L (ref 11–38)
POC PTINR: 1.1 (ref 0.9–1.2)
POC PTWBT: 12.6 SEC (ref 9.7–14.3)
POC TCO2: 30 MMOL/L (ref 18–33)
POTASSIUM BLD-SCNC: 3.4 MMOL/L (ref 3.6–5.1)
PROTEIN, POC: 6.9 G/DL (ref 6.4–8.1)
SAMPLE: NORMAL
SODIUM BLD-SCNC: 139 MMOL/L (ref 128–145)

## 2021-10-22 PROCEDURE — 3077F PR MOST RECENT SYSTOLIC BLOOD PRESSURE >= 140 MM HG: ICD-10-PCS | Mod: CPTII,S$GLB,, | Performed by: NURSE PRACTITIONER

## 2021-10-22 PROCEDURE — 99283 EMERGENCY DEPT VISIT LOW MDM: CPT | Mod: 25,ER

## 2021-10-22 PROCEDURE — 99213 PR OFFICE/OUTPT VISIT, EST, LEVL III, 20-29 MIN: ICD-10-PCS | Mod: S$PBB,,, | Performed by: STUDENT IN AN ORGANIZED HEALTH CARE EDUCATION/TRAINING PROGRAM

## 2021-10-22 PROCEDURE — 99214 OFFICE O/P EST MOD 30 MIN: CPT | Mod: PBBFAC,25,27,PO | Performed by: STUDENT IN AN ORGANIZED HEALTH CARE EDUCATION/TRAINING PROGRAM

## 2021-10-22 PROCEDURE — 99213 OFFICE O/P EST LOW 20 MIN: CPT | Mod: S$PBB,,, | Performed by: STUDENT IN AN ORGANIZED HEALTH CARE EDUCATION/TRAINING PROGRAM

## 2021-10-22 PROCEDURE — 3044F HG A1C LEVEL LT 7.0%: CPT | Mod: CPTII,S$GLB,, | Performed by: NURSE PRACTITIONER

## 2021-10-22 PROCEDURE — 99214 PR OFFICE/OUTPT VISIT, EST, LEVL IV, 30-39 MIN: ICD-10-PCS | Mod: S$GLB,ICN,, | Performed by: NURSE PRACTITIONER

## 2021-10-22 PROCEDURE — 25000003 PHARM REV CODE 250: Mod: ER | Performed by: INTERNAL MEDICINE

## 2021-10-22 PROCEDURE — 1160F PR REVIEW ALL MEDS BY PRESCRIBER/CLIN PHARMACIST DOCUMENTED: ICD-10-PCS | Mod: CPTII,S$GLB,, | Performed by: NURSE PRACTITIONER

## 2021-10-22 PROCEDURE — 3008F BODY MASS INDEX DOCD: CPT | Mod: CPTII,S$GLB,, | Performed by: NURSE PRACTITIONER

## 2021-10-22 PROCEDURE — 3080F PR MOST RECENT DIASTOLIC BLOOD PRESSURE >= 90 MM HG: ICD-10-PCS | Mod: CPTII,S$GLB,, | Performed by: NURSE PRACTITIONER

## 2021-10-22 PROCEDURE — 3080F DIAST BP >= 90 MM HG: CPT | Mod: CPTII,S$GLB,, | Performed by: NURSE PRACTITIONER

## 2021-10-22 PROCEDURE — 4010F ACE/ARB THERAPY RXD/TAKEN: CPT | Mod: CPTII,S$GLB,, | Performed by: NURSE PRACTITIONER

## 2021-10-22 PROCEDURE — 99214 OFFICE O/P EST MOD 30 MIN: CPT | Mod: S$GLB,ICN,, | Performed by: NURSE PRACTITIONER

## 2021-10-22 PROCEDURE — 3044F PR MOST RECENT HEMOGLOBIN A1C LEVEL <7.0%: ICD-10-PCS | Mod: CPTII,S$GLB,, | Performed by: NURSE PRACTITIONER

## 2021-10-22 PROCEDURE — 30901 CONTROL OF NOSEBLEED: CPT | Mod: ER,RT

## 2021-10-22 PROCEDURE — 1160F RVW MEDS BY RX/DR IN RCRD: CPT | Mod: CPTII,S$GLB,, | Performed by: NURSE PRACTITIONER

## 2021-10-22 PROCEDURE — 99999 PR PBB SHADOW E&M-EST. PATIENT-LVL IV: CPT | Mod: PBBFAC,,, | Performed by: STUDENT IN AN ORGANIZED HEALTH CARE EDUCATION/TRAINING PROGRAM

## 2021-10-22 PROCEDURE — 1159F MED LIST DOCD IN RCRD: CPT | Mod: CPTII,S$GLB,, | Performed by: NURSE PRACTITIONER

## 2021-10-22 PROCEDURE — 80053 COMPREHEN METABOLIC PANEL: CPT | Mod: ER

## 2021-10-22 PROCEDURE — 4010F PR ACE/ARB THEARPY RXD/TAKEN: ICD-10-PCS | Mod: CPTII,S$GLB,, | Performed by: NURSE PRACTITIONER

## 2021-10-22 PROCEDURE — 1159F PR MEDICATION LIST DOCUMENTED IN MEDICAL RECORD: ICD-10-PCS | Mod: CPTII,S$GLB,, | Performed by: NURSE PRACTITIONER

## 2021-10-22 PROCEDURE — 99999 PR PBB SHADOW E&M-EST. PATIENT-LVL IV: ICD-10-PCS | Mod: PBBFAC,,, | Performed by: STUDENT IN AN ORGANIZED HEALTH CARE EDUCATION/TRAINING PROGRAM

## 2021-10-22 PROCEDURE — 3077F SYST BP >= 140 MM HG: CPT | Mod: CPTII,S$GLB,, | Performed by: NURSE PRACTITIONER

## 2021-10-22 PROCEDURE — 30905 CONTROL OF NOSEBLEED: CPT | Mod: ER

## 2021-10-22 PROCEDURE — 85025 COMPLETE CBC W/AUTO DIFF WBC: CPT | Mod: ER

## 2021-10-22 PROCEDURE — 85610 PROTHROMBIN TIME: CPT | Mod: ER

## 2021-10-22 PROCEDURE — 3008F PR BODY MASS INDEX (BMI) DOCUMENTED: ICD-10-PCS | Mod: CPTII,S$GLB,, | Performed by: NURSE PRACTITIONER

## 2021-10-22 RX ORDER — CLONIDINE HYDROCHLORIDE 0.1 MG/1
0.2 TABLET ORAL
Status: COMPLETED | OUTPATIENT
Start: 2021-10-22 | End: 2021-10-22

## 2021-10-22 RX ORDER — LORAZEPAM 0.5 MG/1
0.5 TABLET ORAL EVERY 6 HOURS PRN
Qty: 10 TABLET | Refills: 0 | Status: SHIPPED | OUTPATIENT
Start: 2021-10-22 | End: 2022-06-28

## 2021-10-22 RX ORDER — CEPHALEXIN 250 MG/1
250 CAPSULE ORAL EVERY 12 HOURS
Qty: 10 CAPSULE | Refills: 0 | Status: SHIPPED | OUTPATIENT
Start: 2021-10-22 | End: 2021-10-27

## 2021-10-22 RX ADMIN — CLONIDINE HYDROCHLORIDE 0.2 MG: 0.1 TABLET ORAL at 01:10

## 2021-10-26 ENCOUNTER — DOCUMENTATION ONLY (OUTPATIENT)
Dept: OTOLARYNGOLOGY | Facility: CLINIC | Age: 56
End: 2021-10-26

## 2021-10-26 ENCOUNTER — OFFICE VISIT (OUTPATIENT)
Dept: OTOLARYNGOLOGY | Facility: CLINIC | Age: 56
End: 2021-10-26
Payer: MEDICARE

## 2021-10-26 ENCOUNTER — TELEPHONE (OUTPATIENT)
Dept: OTOLARYNGOLOGY | Facility: CLINIC | Age: 56
End: 2021-10-26

## 2021-10-26 VITALS
BODY MASS INDEX: 21.17 KG/M2 | SYSTOLIC BLOOD PRESSURE: 168 MMHG | HEIGHT: 64 IN | DIASTOLIC BLOOD PRESSURE: 92 MMHG | WEIGHT: 124 LBS

## 2021-10-26 DIAGNOSIS — R04.0 EPISTAXIS: Primary | ICD-10-CM

## 2021-10-26 PROCEDURE — 3077F SYST BP >= 140 MM HG: CPT | Mod: CPTII,S$GLB,, | Performed by: NURSE PRACTITIONER

## 2021-10-26 PROCEDURE — 1160F RVW MEDS BY RX/DR IN RCRD: CPT | Mod: CPTII,S$GLB,, | Performed by: NURSE PRACTITIONER

## 2021-10-26 PROCEDURE — 3044F HG A1C LEVEL LT 7.0%: CPT | Mod: CPTII,S$GLB,, | Performed by: NURSE PRACTITIONER

## 2021-10-26 PROCEDURE — 1159F PR MEDICATION LIST DOCUMENTED IN MEDICAL RECORD: ICD-10-PCS | Mod: CPTII,S$GLB,, | Performed by: NURSE PRACTITIONER

## 2021-10-26 PROCEDURE — 3077F PR MOST RECENT SYSTOLIC BLOOD PRESSURE >= 140 MM HG: ICD-10-PCS | Mod: CPTII,S$GLB,, | Performed by: NURSE PRACTITIONER

## 2021-10-26 PROCEDURE — 3080F PR MOST RECENT DIASTOLIC BLOOD PRESSURE >= 90 MM HG: ICD-10-PCS | Mod: CPTII,S$GLB,, | Performed by: NURSE PRACTITIONER

## 2021-10-26 PROCEDURE — 99499 UNLISTED E&M SERVICE: CPT | Mod: S$GLB,,, | Performed by: NURSE PRACTITIONER

## 2021-10-26 PROCEDURE — 1159F MED LIST DOCD IN RCRD: CPT | Mod: CPTII,S$GLB,, | Performed by: NURSE PRACTITIONER

## 2021-10-26 PROCEDURE — 3080F DIAST BP >= 90 MM HG: CPT | Mod: CPTII,S$GLB,, | Performed by: NURSE PRACTITIONER

## 2021-10-26 PROCEDURE — 3008F PR BODY MASS INDEX (BMI) DOCUMENTED: ICD-10-PCS | Mod: CPTII,S$GLB,, | Performed by: NURSE PRACTITIONER

## 2021-10-26 PROCEDURE — 4010F ACE/ARB THERAPY RXD/TAKEN: CPT | Mod: CPTII,S$GLB,, | Performed by: NURSE PRACTITIONER

## 2021-10-26 PROCEDURE — 3044F PR MOST RECENT HEMOGLOBIN A1C LEVEL <7.0%: ICD-10-PCS | Mod: CPTII,S$GLB,, | Performed by: NURSE PRACTITIONER

## 2021-10-26 PROCEDURE — 99499 NO LOS: ICD-10-PCS | Mod: S$GLB,,, | Performed by: NURSE PRACTITIONER

## 2021-10-26 PROCEDURE — 30300 REMOVE NASAL FOREIGN BODY: CPT | Mod: S$GLB,,, | Performed by: NURSE PRACTITIONER

## 2021-10-26 PROCEDURE — 30300 PR REMOVE, FOREIGN BODY, NASAL: ICD-10-PCS | Mod: S$GLB,,, | Performed by: NURSE PRACTITIONER

## 2021-10-26 PROCEDURE — 3008F BODY MASS INDEX DOCD: CPT | Mod: CPTII,S$GLB,, | Performed by: NURSE PRACTITIONER

## 2021-10-26 PROCEDURE — 1160F PR REVIEW ALL MEDS BY PRESCRIBER/CLIN PHARMACIST DOCUMENTED: ICD-10-PCS | Mod: CPTII,S$GLB,, | Performed by: NURSE PRACTITIONER

## 2021-10-26 PROCEDURE — 4010F PR ACE/ARB THEARPY RXD/TAKEN: ICD-10-PCS | Mod: CPTII,S$GLB,, | Performed by: NURSE PRACTITIONER

## 2021-11-01 ENCOUNTER — TELEPHONE (OUTPATIENT)
Dept: CARDIOLOGY | Facility: HOSPITAL | Age: 56
End: 2021-11-01
Payer: COMMERCIAL

## 2021-11-01 ENCOUNTER — CLINICAL SUPPORT (OUTPATIENT)
Dept: CARDIOLOGY | Facility: HOSPITAL | Age: 56
End: 2021-11-01
Attending: INTERNAL MEDICINE
Payer: MEDICARE

## 2021-11-01 ENCOUNTER — TELEPHONE (OUTPATIENT)
Dept: ELECTROPHYSIOLOGY | Facility: CLINIC | Age: 56
End: 2021-11-01

## 2021-11-01 ENCOUNTER — TELEPHONE (OUTPATIENT)
Dept: CARDIOLOGY | Facility: CLINIC | Age: 56
End: 2021-11-01
Payer: COMMERCIAL

## 2021-11-01 DIAGNOSIS — Z95.810 PRESENCE OF AUTOMATIC (IMPLANTABLE) CARDIAC DEFIBRILLATOR: Primary | ICD-10-CM

## 2021-11-01 DIAGNOSIS — Z12.31 ENCOUNTER FOR SCREENING MAMMOGRAM FOR MALIGNANT NEOPLASM OF BREAST: Primary | ICD-10-CM

## 2021-11-01 DIAGNOSIS — I50.22 CHF (CONGESTIVE HEART FAILURE), NYHA CLASS II, CHRONIC, SYSTOLIC: ICD-10-CM

## 2021-11-01 DIAGNOSIS — Z95.810 PRESENCE OF AUTOMATIC (IMPLANTABLE) CARDIAC DEFIBRILLATOR: ICD-10-CM

## 2021-11-01 DIAGNOSIS — I49.9 CARDIAC ARRHYTHMIA, UNSPECIFIED CARDIAC ARRHYTHMIA TYPE: ICD-10-CM

## 2021-11-01 DIAGNOSIS — R07.9 CHEST PAIN, UNSPECIFIED TYPE: ICD-10-CM

## 2021-11-01 PROCEDURE — 93295 CARDIAC DEVICE CHECK - REMOTE: ICD-10-PCS | Mod: ,,, | Performed by: INTERNAL MEDICINE

## 2021-11-01 PROCEDURE — 93295 DEV INTERROG REMOTE 1/2/MLT: CPT | Mod: ,,, | Performed by: INTERNAL MEDICINE

## 2021-11-02 ENCOUNTER — HOSPITAL ENCOUNTER (OUTPATIENT)
Dept: CARDIOLOGY | Facility: CLINIC | Age: 56
Discharge: HOME | End: 2021-11-02
Payer: MEDICARE

## 2021-11-02 ENCOUNTER — OFFICE VISIT (OUTPATIENT)
Dept: CARDIOLOGY | Facility: CLINIC | Age: 56
End: 2021-11-02
Payer: MEDICARE

## 2021-11-02 VITALS
WEIGHT: 124.13 LBS | HEART RATE: 70 BPM | OXYGEN SATURATION: 94 % | HEIGHT: 64 IN | SYSTOLIC BLOOD PRESSURE: 151 MMHG | BODY MASS INDEX: 21.19 KG/M2 | DIASTOLIC BLOOD PRESSURE: 75 MMHG

## 2021-11-02 DIAGNOSIS — Z99.2 ESRD (END STAGE RENAL DISEASE) ON DIALYSIS: ICD-10-CM

## 2021-11-02 DIAGNOSIS — I50.22 CHF (CONGESTIVE HEART FAILURE), NYHA CLASS II, CHRONIC, SYSTOLIC: ICD-10-CM

## 2021-11-02 DIAGNOSIS — R07.9 CHEST PAIN, UNSPECIFIED TYPE: ICD-10-CM

## 2021-11-02 DIAGNOSIS — I49.9 CARDIAC ARRHYTHMIA, UNSPECIFIED CARDIAC ARRHYTHMIA TYPE: ICD-10-CM

## 2021-11-02 DIAGNOSIS — I10 PRIMARY HYPERTENSION: ICD-10-CM

## 2021-11-02 DIAGNOSIS — I34.0 NONRHEUMATIC MITRAL VALVE REGURGITATION: ICD-10-CM

## 2021-11-02 DIAGNOSIS — M79.89 LEFT ARM SWELLING: ICD-10-CM

## 2021-11-02 DIAGNOSIS — I42.8 CARDIOMYOPATHY, NONISCHEMIC: ICD-10-CM

## 2021-11-02 DIAGNOSIS — I51.89 COMBINED SYSTOLIC AND DIASTOLIC CARDIAC DYSFUNCTION: ICD-10-CM

## 2021-11-02 DIAGNOSIS — Z95.810 PRESENCE OF AUTOMATIC (IMPLANTABLE) CARDIAC DEFIBRILLATOR: ICD-10-CM

## 2021-11-02 DIAGNOSIS — R07.9 CHEST PAIN, UNSPECIFIED TYPE: Primary | ICD-10-CM

## 2021-11-02 DIAGNOSIS — Z95.810 ICD (IMPLANTABLE CARDIOVERTER-DEFIBRILLATOR) IN PLACE: ICD-10-CM

## 2021-11-02 DIAGNOSIS — N18.6 ESRD (END STAGE RENAL DISEASE) ON DIALYSIS: ICD-10-CM

## 2021-11-02 PROCEDURE — 99213 OFFICE O/P EST LOW 20 MIN: CPT | Mod: PBBFAC | Performed by: PHYSICIAN ASSISTANT

## 2021-11-02 PROCEDURE — 99999 PR PBB SHADOW E&M-EST. PATIENT-LVL III: ICD-10-PCS | Mod: PBBFAC,,, | Performed by: PHYSICIAN ASSISTANT

## 2021-11-02 PROCEDURE — 99214 OFFICE O/P EST MOD 30 MIN: CPT | Mod: 25,S$PBB,, | Performed by: PHYSICIAN ASSISTANT

## 2021-11-02 PROCEDURE — 93005 ELECTROCARDIOGRAM TRACING: CPT | Mod: PBBFAC | Performed by: INTERNAL MEDICINE

## 2021-11-02 PROCEDURE — 93010 ELECTROCARDIOGRAM REPORT: CPT | Mod: S$PBB,,, | Performed by: INTERNAL MEDICINE

## 2021-11-02 PROCEDURE — 99999 PR PBB SHADOW E&M-EST. PATIENT-LVL III: CPT | Mod: PBBFAC,,, | Performed by: PHYSICIAN ASSISTANT

## 2021-11-02 PROCEDURE — 93010 EKG 12-LEAD: ICD-10-PCS | Mod: S$PBB,,, | Performed by: INTERNAL MEDICINE

## 2021-11-02 PROCEDURE — 99214 PR OFFICE/OUTPT VISIT, EST, LEVL IV, 30-39 MIN: ICD-10-PCS | Mod: 25,S$PBB,, | Performed by: PHYSICIAN ASSISTANT

## 2021-11-02 RX ORDER — OLMESARTAN MEDOXOMIL 40 MG/1
40 TABLET ORAL DAILY
Qty: 90 TABLET | Refills: 3 | Status: SHIPPED | OUTPATIENT
Start: 2021-11-02 | End: 2022-07-07

## 2021-11-04 ENCOUNTER — TELEPHONE (OUTPATIENT)
Dept: VASCULAR SURGERY | Facility: CLINIC | Age: 56
End: 2021-11-04
Payer: COMMERCIAL

## 2021-11-12 ENCOUNTER — TELEPHONE (OUTPATIENT)
Dept: VASCULAR SURGERY | Facility: CLINIC | Age: 56
End: 2021-11-12
Payer: COMMERCIAL

## 2021-11-12 ENCOUNTER — HOSPITAL ENCOUNTER (OUTPATIENT)
Dept: RADIOLOGY | Facility: HOSPITAL | Age: 56
Discharge: HOME | End: 2021-11-12
Attending: INTERNAL MEDICINE
Payer: MEDICARE

## 2021-11-12 DIAGNOSIS — Z12.31 ENCOUNTER FOR SCREENING MAMMOGRAM FOR MALIGNANT NEOPLASM OF BREAST: ICD-10-CM

## 2021-11-12 PROCEDURE — 77067 MAMMO DIGITAL SCREENING BILAT WITH TOMO: ICD-10-PCS | Mod: 26,,, | Performed by: RADIOLOGY

## 2021-11-12 PROCEDURE — 77067 SCR MAMMO BI INCL CAD: CPT | Mod: 26,,, | Performed by: RADIOLOGY

## 2021-11-12 PROCEDURE — 77063 MAMMO DIGITAL SCREENING BILAT WITH TOMO: ICD-10-PCS | Mod: 26,,, | Performed by: RADIOLOGY

## 2021-11-12 PROCEDURE — 77067 SCR MAMMO BI INCL CAD: CPT | Mod: TC,PO

## 2021-11-12 PROCEDURE — 77063 BREAST TOMOSYNTHESIS BI: CPT | Mod: 26,,, | Performed by: RADIOLOGY

## 2021-11-17 DIAGNOSIS — C64.9 METASTATIC RENAL CELL CARCINOMA, UNSPECIFIED LATERALITY: ICD-10-CM

## 2021-12-03 ENCOUNTER — OFFICE VISIT (OUTPATIENT)
Dept: VASCULAR SURGERY | Facility: CLINIC | Age: 56
End: 2021-12-03
Payer: MEDICARE

## 2021-12-03 ENCOUNTER — HOSPITAL ENCOUNTER (OUTPATIENT)
Dept: VASCULAR SURGERY | Facility: CLINIC | Age: 56
Discharge: HOME | End: 2021-12-03
Attending: SURGERY
Payer: MEDICARE

## 2021-12-03 VITALS
TEMPERATURE: 98 F | BODY MASS INDEX: 20.13 KG/M2 | HEIGHT: 66 IN | HEART RATE: 74 BPM | WEIGHT: 125.25 LBS | SYSTOLIC BLOOD PRESSURE: 152 MMHG | DIASTOLIC BLOOD PRESSURE: 82 MMHG

## 2021-12-03 DIAGNOSIS — N18.6 ESRD (END STAGE RENAL DISEASE) ON DIALYSIS: Primary | ICD-10-CM

## 2021-12-03 DIAGNOSIS — N18.6 ESRD (END STAGE RENAL DISEASE) ON DIALYSIS: ICD-10-CM

## 2021-12-03 DIAGNOSIS — Z99.2 ESRD (END STAGE RENAL DISEASE) ON DIALYSIS: Primary | ICD-10-CM

## 2021-12-03 DIAGNOSIS — Z99.2 ESRD (END STAGE RENAL DISEASE) ON DIALYSIS: ICD-10-CM

## 2021-12-03 PROCEDURE — 99999 PR PBB SHADOW E&M-EST. PATIENT-LVL III: CPT | Mod: PBBFAC,,, | Performed by: SURGERY

## 2021-12-03 PROCEDURE — 93990 DOPPLER FLOW TESTING: CPT | Mod: 26,S$PBB,, | Performed by: SURGERY

## 2021-12-03 PROCEDURE — 93990 DOPPLER FLOW TESTING: CPT | Mod: PBBFAC | Performed by: SURGERY

## 2021-12-03 PROCEDURE — 93990 PR DUPLEX HEMODIALYSIS ACCESS: ICD-10-PCS | Mod: 26,S$PBB,, | Performed by: SURGERY

## 2021-12-03 PROCEDURE — 99213 OFFICE O/P EST LOW 20 MIN: CPT | Mod: S$PBB,,, | Performed by: SURGERY

## 2021-12-03 PROCEDURE — 99999 PR PBB SHADOW E&M-EST. PATIENT-LVL III: ICD-10-PCS | Mod: PBBFAC,,, | Performed by: SURGERY

## 2021-12-03 PROCEDURE — 99213 OFFICE O/P EST LOW 20 MIN: CPT | Mod: PBBFAC | Performed by: SURGERY

## 2021-12-03 PROCEDURE — 99213 PR OFFICE/OUTPT VISIT, EST, LEVL III, 20-29 MIN: ICD-10-PCS | Mod: S$PBB,,, | Performed by: SURGERY

## 2022-02-08 DIAGNOSIS — C64.9 METASTATIC RENAL CELL CARCINOMA, UNSPECIFIED LATERALITY: ICD-10-CM

## 2022-02-08 NOTE — TELEPHONE ENCOUNTER
----- Message from Gene Huerta sent at 2/8/2022  8:08 AM CST -----  Alex sharma /[facility] called requesting a prior authorization for cabozantinib (CABOMETYX) 60 mg Tab    Alex can be reached at [phone].    Thanks

## 2022-02-09 DIAGNOSIS — C64.9 METASTATIC RENAL CELL CARCINOMA, UNSPECIFIED LATERALITY: ICD-10-CM

## 2022-02-18 ENCOUNTER — SPECIALTY PHARMACY (OUTPATIENT)
Dept: PHARMACY | Facility: CLINIC | Age: 57
End: 2022-02-18
Payer: COMMERCIAL

## 2022-02-18 NOTE — TELEPHONE ENCOUNTER
Confirmed that [facility] can fill; however, pt has been filling with [facility]. Called to see where she would like to continue to fill. Pt requested to continue to fill with [facility] at this time. [facility] and provided her with [facility] phone number in case she would like us to fill her medication in the future.

## 2022-02-25 ENCOUNTER — OFFICE VISIT (OUTPATIENT)
Dept: OPTOMETRY | Facility: CLINIC | Age: 57
End: 2022-02-25
Payer: COMMERCIAL

## 2022-02-25 DIAGNOSIS — I10 PRIMARY HYPERTENSION: Primary | ICD-10-CM

## 2022-02-25 PROCEDURE — 99999 PR PBB SHADOW E&M-EST. PATIENT-LVL III: ICD-10-PCS | Mod: PBBFAC,,, | Performed by: OPTOMETRIST

## 2022-02-25 PROCEDURE — 92014 COMPRE OPH EXAM EST PT 1/>: CPT | Mod: S$PBB,,, | Performed by: OPTOMETRIST

## 2022-02-25 PROCEDURE — 99999 PR PBB SHADOW E&M-EST. PATIENT-LVL III: CPT | Mod: PBBFAC,,, | Performed by: OPTOMETRIST

## 2022-02-25 PROCEDURE — 92014 PR EYE EXAM, EST PATIENT,COMPREHESV: ICD-10-PCS | Mod: S$PBB,,, | Performed by: OPTOMETRIST

## 2022-02-25 PROCEDURE — 99213 OFFICE O/P EST LOW 20 MIN: CPT | Mod: PBBFAC,PO | Performed by: OPTOMETRIST

## 2022-02-25 NOTE — PROGRESS NOTES
Subjective:       Patient ID: Eva Bloom is a 56 y.o. female      Chief Complaint   Patient presents with    Concerns About Ocular Health    Hypertensive Eye Exam     History of Present Illness  Dls: 2/8/21 Dr. Larsen     55 y/o female presents today for ocular health check.  Pt wears single vision glasses for reading.     Not earing  No itching  No burning  No pain  No ha's  No floaters    Eye meds  None     Assessment/Plan:     1. Primary hypertension  No hypertensive retinopathy. Continue BP control. RTC 1 year for DFE.      Follow up in about 1 year (around 2/25/2023).

## 2022-03-21 ENCOUNTER — PATIENT MESSAGE (OUTPATIENT)
Dept: CARDIOLOGY | Facility: CLINIC | Age: 57
End: 2022-03-21
Payer: COMMERCIAL

## 2022-03-30 ENCOUNTER — OFFICE VISIT (OUTPATIENT)
Dept: CARDIOLOGY | Facility: CLINIC | Age: 57
End: 2022-03-30
Payer: MEDICARE

## 2022-03-30 VITALS
DIASTOLIC BLOOD PRESSURE: 84 MMHG | SYSTOLIC BLOOD PRESSURE: 170 MMHG | HEIGHT: 64 IN | BODY MASS INDEX: 20.82 KG/M2 | HEART RATE: 85 BPM | WEIGHT: 121.94 LBS

## 2022-03-30 DIAGNOSIS — I51.89 COMBINED SYSTOLIC AND DIASTOLIC CARDIAC DYSFUNCTION: ICD-10-CM

## 2022-03-30 DIAGNOSIS — I50.22 CHF (CONGESTIVE HEART FAILURE), NYHA CLASS II, CHRONIC, SYSTOLIC: ICD-10-CM

## 2022-03-30 DIAGNOSIS — Z99.2 ESRD (END STAGE RENAL DISEASE) ON DIALYSIS: ICD-10-CM

## 2022-03-30 DIAGNOSIS — I10 PRIMARY HYPERTENSION: ICD-10-CM

## 2022-03-30 DIAGNOSIS — Z90.5 S/P NEPHRECTOMY: Chronic | ICD-10-CM

## 2022-03-30 DIAGNOSIS — N18.6 ANEMIA IN ESRD (END-STAGE RENAL DISEASE): ICD-10-CM

## 2022-03-30 DIAGNOSIS — N18.6 ESRD (END STAGE RENAL DISEASE) ON DIALYSIS: ICD-10-CM

## 2022-03-30 DIAGNOSIS — I27.20 PULMONARY HYPERTENSION: ICD-10-CM

## 2022-03-30 DIAGNOSIS — I42.8 CARDIOMYOPATHY, NONISCHEMIC: Primary | ICD-10-CM

## 2022-03-30 DIAGNOSIS — D63.1 ANEMIA IN ESRD (END-STAGE RENAL DISEASE): ICD-10-CM

## 2022-03-30 DIAGNOSIS — Z95.810 ICD (IMPLANTABLE CARDIOVERTER-DEFIBRILLATOR) IN PLACE: ICD-10-CM

## 2022-03-30 DIAGNOSIS — I34.0 NONRHEUMATIC MITRAL VALVE REGURGITATION: ICD-10-CM

## 2022-03-30 DIAGNOSIS — E87.5 HYPERKALEMIA: ICD-10-CM

## 2022-03-30 PROCEDURE — 99999 PR PBB SHADOW E&M-EST. PATIENT-LVL III: CPT | Mod: PBBFAC,,, | Performed by: INTERNAL MEDICINE

## 2022-03-30 PROCEDURE — 99214 OFFICE O/P EST MOD 30 MIN: CPT | Mod: S$PBB,,, | Performed by: INTERNAL MEDICINE

## 2022-03-30 PROCEDURE — 99999 PR PBB SHADOW E&M-EST. PATIENT-LVL III: ICD-10-PCS | Mod: PBBFAC,,, | Performed by: INTERNAL MEDICINE

## 2022-03-30 PROCEDURE — 99213 OFFICE O/P EST LOW 20 MIN: CPT | Mod: PBBFAC | Performed by: INTERNAL MEDICINE

## 2022-03-30 PROCEDURE — 99214 PR OFFICE/OUTPT VISIT, EST, LEVL IV, 30-39 MIN: ICD-10-PCS | Mod: S$PBB,,, | Performed by: INTERNAL MEDICINE

## 2022-03-30 NOTE — PROGRESS NOTES
Subjective:   Patient ID:  Eva Bloom is a 56 y.o. female who presents for follow-up of Fatigue and Cardiomyopathy (4 month f/u )    Nonischemic cardiomyopathy   HFrEF s/p SC-ICD  Chronic pericardial effusion   Hypertension  Pulmonary hypertension  Renal cell carcinoma  ESRD on HD     Interval History  Presents with CP x 3-4 days. States it is a light pain and that comes and goes randomly and lasts a few seconds at a time. Nonexertional. She also reports LUE swelling. She has AVF in LUE. Also mild LE edema which is chronic. She denies SOB, syncope, near syncope, sustained palpitations. She had a nosebleed recently. Packing was removed about a week ago, no further bleeding. BP is typically 130s/70s, sometimes higher, at home. Of note, she was switched from Olmesartan to Entresto following her last visit. The Entresto caused diarrhea so she stopped it.      1/2021 HPI (Sowmya Stock)  Ms. Bloom is in clinic today for routine follow up.  At her last visit her, her metoprolol was increased to 100 mg daily and imdur was changed to isordil 30 mg BID to match components of bydil.  Denies hypotension during HD.  Her BP is running 130-140s systolic.  Patient denies chest pain with exertion or at rest, palpitations, SOB, WARREN, dizziness, syncope, edema, orthopnea, PND, or claudication.  Reports no routine exercise due to fatigue.      Echo 2/21  · Mild concentric hypertrophy and moderately decreased systolic function. The estimated ejection fraction is 30%  · Grade II left ventricular diastolic dysfunction.  · Moderate mitral regurgitation.  · Mild right ventricular enlargement with normal right ventricular systolic function.  · Moderate to severe tricuspid regurgitation.  · Mild aortic regurgitation.  · Severe left atrial enlargement.  · Moderate right atrial enlargement.  · Intermediate central venous pressure (8 mmHg).  · The estimated PA systolic pressure is 72 mmHg.  · There is pulmonary hypertension.  · Moderate  "posterior pericardial effusion.       HPI:   patent feels weak with limited activity  No chest pain, Orthopnea, PND of heart failure symptoms.   Denies palpitations or fluttering in the chest  BP  Has been consistently elevated in the clinic but her pressures have been better at home and in the dialysis clinic per her.     Patient Active Problem List   Diagnosis    Kidney stones    Hyperparathyroidism, secondary renal    Hyperuricemia without signs of inflammatory arthritis and tophaceous disease    Proteinuria    Urate nephropathy    Hypertension    Pap smear abnormality of cervix with ASCUS favoring benign    CMV (cytomegalovirus) antibody positive    Herpes simplex type 1 antibody positive    H/O herpes simplex type 2 infection    Multinodular goiter    History of kidney cancer    S/p nephrectomy left    Metastatic renal cell carcinoma to intra-abdominal site    Primary insomnia    Cardiomyopathy, nonischemic    Combined systolic and diastolic cardiac dysfunction    Pulmonary hypertension    ESRD (end stage renal disease) on dialysis    Nonrheumatic mitral valve regurgitation    CHF (congestive heart failure), NYHA class II, chronic, systolic    Hyperkalemia    Anemia in ESRD (end-stage renal disease)    Chronic kidney disease-mineral and bone disorder    Hyperphosphatemia    ICD (implantable cardioverter-defibrillator) in place - SubQ    Right-sided epistaxis    Left-sided epistaxis     BP (!) 170/84 (BP Location: Left arm, Patient Position: Sitting, BP Method: Medium (Automatic))   Pulse 85   Ht 5' 4" (1.626 m)   Wt 55.3 kg (121 lb 14.6 oz)   LMP 10/24/2016   BMI 20.93 kg/m²   Body mass index is 20.93 kg/m².  CrCl cannot be calculated (Patient's most recent lab result is older than the maximum 7 days allowed.).    Lab Results   Component Value Date     09/13/2021    K 4.5 09/13/2021     09/13/2021    CO2 20 (L) 09/13/2021    BUN 58 (H) 09/13/2021    CREATININE 7.8 " (H) 09/13/2021     (H) 09/13/2021    HGBA1C 5.2 02/26/2021    MG 2.4 03/16/2020    AST 45 (H) 09/13/2021    ALT 39 09/13/2021    ALBUMIN 3.1 (L) 09/13/2021    PROT 7.0 09/13/2021    BILITOT 1.6 (H) 09/13/2021    WBC 4.39 09/13/2021    HGB 11.6 (L) 09/13/2021    HCT 35.4 (L) 09/13/2021     (H) 09/13/2021     (L) 09/13/2021    INR 1.0 04/16/2021    TSH 7.350 (H) 09/13/2021    CHOL 99 (L) 02/26/2021    HDL 34 (L) 02/26/2021    LDLCALC 54.4 (L) 02/26/2021    TRIG 53 02/26/2021       Current Outpatient Medications   Medication Sig    acetaminophen (TYLENOL) 500 MG tablet Take 500 mg by mouth every 6 (six) hours as needed for Pain.    aluminum-magnesium hydroxide-simethicone (MAALOX) 200-200-20 mg/5 mL Susp Take 30 mLs by mouth every 6 (six) hours as needed.     amLODIPine (NORVASC) 5 MG tablet TAKE 1 TABLET BY MOUTH EVERY DAY    cabozantinib (CABOMETYX) 60 mg Tab Take 60 mg by mouth once daily.    cloNIDine 0.3 mg/24 hr td ptwk (CATAPRES) 0.3 mg/24 hr Place 1 patch onto the skin every 7 days.    hydrALAZINE (APRESOLINE) 100 MG tablet TAKE 1 TABLET BY MOUTH EVERY 12 HOURS    isosorbide dinitrate (ISORDIL) 30 MG Tab TAKE 1 TABLET (30 MG TOTAL) BY MOUTH 2 (TWO) TIMES DAILY.    lidocaine-prilocaine (EMLA) cream APPLY ATLEAST 30 MINUTES BEFORE TREATMENT 3 TIMES A WEEK    LORazepam (ATIVAN) 0.5 MG tablet Take 1 tablet (0.5 mg total) by mouth every 6 (six) hours as needed for Anxiety.    metoprolol succinate (TOPROL-XL) 100 MG 24 hr tablet Take 1 tablet (100 mg total) by mouth once daily.    multivitamin-Ca-iron-minerals 27-0.4 mg Tab Take by mouth. 1 Tablet Oral Every day    olmesartan (BENICAR) 40 MG tablet Take 1 tablet (40 mg total) by mouth once daily.    ondansetron (ZOFRAN) 8 MG tablet Take 1 tablet (8 mg total) by mouth every 8 (eight) hours as needed for Nausea.    hydrocortisone 2.5 % cream Apply topically 2 (two) times daily. for 14 days     No current facility-administered  medications for this visit.     Facility-Administered Medications Ordered in Other Visits   Medication    ceFAZolin injection 2 g    sodium chloride 0.9% bolus 1,000 mL       Review of Systems   Constitutional: Positive for malaise/fatigue. Negative for chills, decreased appetite, night sweats, weight gain and weight loss.   Eyes: Negative for blurred vision, double vision, visual disturbance and visual halos.   Cardiovascular: Negative for chest pain, claudication, cyanosis, dyspnea on exertion, irregular heartbeat, leg swelling, near-syncope, orthopnea, palpitations, paroxysmal nocturnal dyspnea and syncope.   Respiratory: Negative for cough, hemoptysis, snoring, sputum production and wheezing.    Endocrine: Negative for cold intolerance, heat intolerance, polydipsia and polyphagia.   Hematologic/Lymphatic: Negative for adenopathy and bleeding problem. Does not bruise/bleed easily.   Skin: Negative for flushing, itching, poor wound healing and rash.   Musculoskeletal: Positive for arthritis, back pain, joint pain, myalgias and stiffness. Negative for falls, gout, joint swelling, muscle cramps, muscle weakness and neck pain.   Gastrointestinal: Negative for bloating, abdominal pain, anorexia, diarrhea, dysphagia, excessive appetite, flatus, hematemesis, jaundice, melena and nausea.   Genitourinary: Negative for hesitancy and incomplete emptying.   Neurological: Negative for aphonia, brief paralysis, difficulty with concentration, disturbances in coordination, excessive daytime sleepiness, dizziness, focal weakness, light-headedness, loss of balance and weakness.   Psychiatric/Behavioral: Negative for altered mental status, depression, hallucinations, hypervigilance, memory loss, substance abuse and suicidal ideas. The patient does not have insomnia and is not nervous/anxious.        Objective:   Physical Exam  Constitutional:       General: She is not in acute distress.     Appearance: She is well-developed. She  is not diaphoretic.   HENT:      Head: Normocephalic and atraumatic.      Nose: Nose normal.      Mouth/Throat:      Pharynx: No oropharyngeal exudate.   Eyes:      General: No scleral icterus.        Right eye: No discharge.         Left eye: No discharge.      Conjunctiva/sclera: Conjunctivae normal.      Pupils: Pupils are equal, round, and reactive to light.   Neck:      Thyroid: No thyromegaly.      Vascular: No JVD.      Trachea: No tracheal deviation.   Cardiovascular:      Rate and Rhythm: Normal rate and regular rhythm.      Pulses: Intact distal pulses.      Heart sounds: Murmur (systolic) heard.     No friction rub. No gallop.   Pulmonary:      Effort: Pulmonary effort is normal. No respiratory distress.      Breath sounds: Normal breath sounds. No stridor. No wheezing or rales.   Chest:      Chest wall: No tenderness.   Abdominal:      General: Bowel sounds are normal. There is no distension.      Palpations: Abdomen is soft. There is no mass.      Tenderness: There is no abdominal tenderness. There is no guarding or rebound.   Musculoskeletal:         General: No tenderness. Normal range of motion.      Cervical back: Normal range of motion and neck supple.   Lymphadenopathy:      Cervical: No cervical adenopathy.   Skin:     General: Skin is warm.      Coloration: Skin is not pale.      Findings: No erythema or rash.   Neurological:      Mental Status: She is alert and oriented to person, place, and time.      Cranial Nerves: No cranial nerve deficit.      Motor: No abnormal muscle tone.      Coordination: Coordination normal.      Deep Tendon Reflexes: Reflexes are normal and symmetric.   Psychiatric:         Behavior: Behavior normal.         Thought Content: Thought content normal.         Judgment: Judgment normal.         Assessment:     1. Cardiomyopathy, nonischemic    2. CHF (congestive heart failure), NYHA class II, chronic, systolic    3. Combined systolic and diastolic cardiac dysfunction     4. Primary hypertension    5. ICD (implantable cardioverter-defibrillator) in place - SubQ    6. Nonrheumatic mitral valve regurgitation    7. Pulmonary hypertension    8. ESRD (end stage renal disease) on dialysis    9. Hyperkalemia    10. S/p nephrectomy left    11. Anemia in ESRD (end-stage renal disease)        Plan:   Patient euvolemic. I have asked her to discuss her high systolic pressure with her nephrolgist. No change in cardiac meds.   Limit sodium intake to less then 2 gram sodium and 1500cc fluid restriction.  Graded exercise program as tolerated.  Call if  more than 3 lbs in 1 day or 5 lbs in 1 week.    RTC 6 months.

## 2022-04-02 DIAGNOSIS — Z99.2 HYPERTENSIVE CKD, ESRD ON DIALYSIS: Primary | ICD-10-CM

## 2022-04-02 DIAGNOSIS — I12.0 HYPERTENSIVE CKD, ESRD ON DIALYSIS: Primary | ICD-10-CM

## 2022-04-02 DIAGNOSIS — N18.6 HYPERTENSIVE CKD, ESRD ON DIALYSIS: Primary | ICD-10-CM

## 2022-04-02 DIAGNOSIS — E83.39 HYPERPHOSPHATEMIA: ICD-10-CM

## 2022-04-02 RX ORDER — ISOSORBIDE MONONITRATE 30 MG/1
30 TABLET, EXTENDED RELEASE ORAL DAILY
Qty: 30 TABLET | Refills: 11 | Status: SHIPPED | OUTPATIENT
Start: 2022-04-02 | End: 2022-07-07

## 2022-04-02 RX ORDER — SEVELAMER CARBONATE 800 MG/1
800 TABLET, FILM COATED ORAL
Qty: 90 TABLET | Refills: 11 | Status: SHIPPED | OUTPATIENT
Start: 2022-04-02 | End: 2022-04-12

## 2022-04-06 ENCOUNTER — TELEPHONE (OUTPATIENT)
Dept: CARDIOLOGY | Facility: HOSPITAL | Age: 57
End: 2022-04-06
Payer: MEDICARE

## 2022-04-06 NOTE — TELEPHONE ENCOUNTER
Called patient to send [place] remote home transmission.  No answer, I left a voice message for him to send his transmission and to give me a call back.  I left my direct number.

## 2022-04-12 DIAGNOSIS — E04.2 MULTINODULAR GOITER: ICD-10-CM

## 2022-04-12 DIAGNOSIS — I10 PRIMARY HYPERTENSION: Primary | ICD-10-CM

## 2022-04-12 DIAGNOSIS — E83.39 HYPERPHOSPHATEMIA: ICD-10-CM

## 2022-04-12 PROBLEM — C64.9 METASTATIC RENAL CELL CARCINOMA TO INTRA-ABDOMINAL SITE: Status: RESOLVED | Noted: 2017-04-06 | Resolved: 2022-04-12

## 2022-04-12 PROBLEM — C79.89 METASTATIC RENAL CELL CARCINOMA TO INTRA-ABDOMINAL SITE: Status: RESOLVED | Noted: 2017-04-06 | Resolved: 2022-04-12

## 2022-04-12 RX ORDER — SEVELAMER CARBONATE 800 MG/1
1600 TABLET, FILM COATED ORAL
Qty: 540 TABLET | Refills: 0 | Status: SHIPPED | OUTPATIENT
Start: 2022-04-12 | End: 2022-08-08

## 2022-04-18 ENCOUNTER — OFFICE VISIT (OUTPATIENT)
Dept: FAMILY MEDICINE | Facility: CLINIC | Age: 57
End: 2022-04-18
Payer: MEDICARE

## 2022-04-18 VITALS
HEIGHT: 65 IN | OXYGEN SATURATION: 95 % | SYSTOLIC BLOOD PRESSURE: 124 MMHG | DIASTOLIC BLOOD PRESSURE: 66 MMHG | WEIGHT: 124.13 LBS | TEMPERATURE: 98 F | HEART RATE: 86 BPM | BODY MASS INDEX: 20.68 KG/M2

## 2022-04-18 DIAGNOSIS — Z99.2 ESRD (END STAGE RENAL DISEASE) ON DIALYSIS: ICD-10-CM

## 2022-04-18 DIAGNOSIS — Z00.00 ROUTINE MEDICAL EXAM: Primary | ICD-10-CM

## 2022-04-18 DIAGNOSIS — Z85.528 HISTORY OF KIDNEY CANCER: ICD-10-CM

## 2022-04-18 DIAGNOSIS — E74.39 GLUCOSE INTOLERANCE: ICD-10-CM

## 2022-04-18 DIAGNOSIS — N25.81 HYPERPARATHYROIDISM, SECONDARY RENAL: ICD-10-CM

## 2022-04-18 DIAGNOSIS — D63.1 ANEMIA IN ESRD (END-STAGE RENAL DISEASE): ICD-10-CM

## 2022-04-18 DIAGNOSIS — I27.20 PULMONARY HYPERTENSION: ICD-10-CM

## 2022-04-18 DIAGNOSIS — I10 PRIMARY HYPERTENSION: ICD-10-CM

## 2022-04-18 DIAGNOSIS — N18.6 ESRD (END STAGE RENAL DISEASE) ON DIALYSIS: ICD-10-CM

## 2022-04-18 DIAGNOSIS — I42.8 CARDIOMYOPATHY, NONISCHEMIC: ICD-10-CM

## 2022-04-18 DIAGNOSIS — N18.6 ANEMIA IN ESRD (END-STAGE RENAL DISEASE): ICD-10-CM

## 2022-04-18 DIAGNOSIS — I51.89 COMBINED SYSTOLIC AND DIASTOLIC CARDIAC DYSFUNCTION: ICD-10-CM

## 2022-04-18 PROBLEM — E83.52 HYPERCALCEMIA: Status: ACTIVE | Noted: 2022-04-04

## 2022-04-18 PROCEDURE — 99396 PREV VISIT EST AGE 40-64: CPT | Mod: S$PBB,,, | Performed by: INTERNAL MEDICINE

## 2022-04-18 PROCEDURE — 99396 PR PREVENTIVE VISIT,EST,40-64: ICD-10-PCS | Mod: S$PBB,,, | Performed by: INTERNAL MEDICINE

## 2022-04-18 PROCEDURE — 99214 OFFICE O/P EST MOD 30 MIN: CPT | Mod: PBBFAC,PO | Performed by: INTERNAL MEDICINE

## 2022-04-18 PROCEDURE — 99999 PR PBB SHADOW E&M-EST. PATIENT-LVL IV: CPT | Mod: PBBFAC,,, | Performed by: INTERNAL MEDICINE

## 2022-04-18 PROCEDURE — 99999 PR PBB SHADOW E&M-EST. PATIENT-LVL IV: ICD-10-PCS | Mod: PBBFAC,,, | Performed by: INTERNAL MEDICINE

## 2022-04-18 RX ORDER — CHLORPHENIR/PHENYLEPH/ASPIRIN 2-7.8-325
1 TABLET, EFFERVESCENT ORAL DAILY
COMMUNITY
Start: 2022-03-10

## 2022-04-18 NOTE — PROGRESS NOTES
HISTORY OF PRESENT ILLNESS:  Eva Bloom is a 56 y.o. female who presents to the clinic today for a routine physical exam. Her last physical exam was approximately 1 years(s) ago.        PAST MEDICAL HISTORY:  Past Medical History:   Diagnosis Date    Anemia     Bronchitis 03/01/2017    Cancer 2016    kidney cancer    CHF (congestive heart failure), NYHA class II, chronic, systolic     CMV (cytomegalovirus) antibody positive     Essential hypertension 9/23/2015    H/O herpes simplex type 2 infection     Herpes simplex type 1 antibody positive     History of kidney cancer     s/p left nephrectomy 1/2016    Hyperparathyroidism, unspecified     Hyperuricemia without signs of inflammatory arthritis and tophaceous disease     Kidney stones     LGSIL (low grade squamous intraepithelial dysplasia)     Myocardiopathy 7/21/2017    Prediabetes     Proteinuria     Renal disorder     Thyroid nodule     Urate nephropathy        PAST SURGICAL HISTORY:  Past Surgical History:   Procedure Laterality Date    BREAST CYST EXCISION      COLONOSCOPY N/A 11/12/2015    COLONOSCOPY N/A 3/12/2021    Procedure: COLONOSCOPY;  Surgeon: Brendon Lanier MD;  Location: [facility];  Service: Endoscopy;  Laterality: N/A;  covid test 3/9, labs prior, prep instr mailed -ml    INSERTION OF BIVENTRICULAR IMPLANTABLE CARDIOVERTER-DEFIBRILLATOR (ICD)  04/2021    NEPHRECTOMY-LAPAROSCOPIC Left 01/12/2016    PERITONEAL CATHETER INSERTION      Permacath insertion  01/12/2017    SALPINGOOPHORECTOMY Right 2016    KJB---DAVINCI    TONSILLECTOMY      TUBAL LIGATION         SOCIAL HISTORY:  Social History     Socioeconomic History    Marital status:     Number of children: 2   Occupational History    Occupation: [employer]     Employer: [id number]   Tobacco Use    Smoking status: Never Smoker    Smokeless tobacco: Never Used   Substance and Sexual Activity    Alcohol use: No     Comment: . 2  children. works at [employer].    Drug use: No    Sexual activity: Yes     Partners: Male       FAMILY HISTORY:  Family History   Problem Relation Age of Onset    Hypertension Mother     Diabetes Father     Kidney disease Father     No Known Problems Son     No Known Problems Son     Diabetes Maternal Grandfather     No Known Problems Sister     No Known Problems Brother     No Known Problems Maternal Grandmother     No Known Problems Paternal Grandmother     No Known Problems Paternal Grandfather     Heart disease Sister     No Known Problems Sister     No Known Problems Brother     No Known Problems Maternal Aunt     No Known Problems Maternal Uncle     No Known Problems Paternal Aunt     No Known Problems Paternal Uncle     Breast cancer Neg Hx     Colon cancer Neg Hx     Cancer Neg Hx     Stroke Neg Hx     Amblyopia Neg Hx     Blindness Neg Hx     Cataracts Neg Hx     Glaucoma Neg Hx     Macular degeneration Neg Hx     Retinal detachment Neg Hx     Strabismus Neg Hx     Thyroid disease Neg Hx        ALLERGIES AND MEDICATIONS: updated and reviewed.  Review of patient's allergies indicates:   Allergen Reactions    Coreg [carvedilol] Other (See Comments)     Nausea/vomiting    Allopurinol      Other reaction(s): abnormal transaminases     Medication List with Changes/Refills   Current Medications    ACETAMINOPHEN (TYLENOL) 500 MG TABLET    Take 500 mg by mouth every 6 (six) hours as needed for Pain.    ALUMINUM-MAGNESIUM HYDROXIDE-SIMETHICONE (MAALOX) 200-200-20 MG/5 ML SUSP    Take 30 mLs by mouth every 6 (six) hours as needed.     AMLODIPINE (NORVASC) 5 MG TABLET    TAKE 1 TABLET BY MOUTH EVERY DAY    CABOZANTINIB (CABOMETYX) 60 MG TAB    Take 60 mg by mouth once daily.    CLONIDINE 0.3 MG/24 HR TD PTWK (CATAPRES) 0.3 MG/24 HR    Place 1 patch onto the skin every 7 days.    EPOETIN DAVID-EPBX (RETACRIT INJ)    Epoetin david - epbx (Retacrit)    EPOETIN DAVID-EPBX (RETACRIT INJ)     Epoetin david - epbx (Retacrit)    HYDRALAZINE (APRESOLINE) 100 MG TABLET    TAKE 1 TABLET BY MOUTH EVERY 12 HOURS    HYDROCORTISONE 2.5 % CREAM    Apply topically 2 (two) times daily. for 14 days    ISOSORBIDE MONONITRATE (IMDUR) 30 MG 24 HR TABLET    Take 1 tablet (30 mg total) by mouth once daily.    LIDOCAINE-PRILOCAINE (EMLA) CREAM    APPLY ATLEAST 30 MINUTES BEFORE TREATMENT 3 TIMES A WEEK    LORAZEPAM (ATIVAN) 0.5 MG TABLET    Take 1 tablet (0.5 mg total) by mouth every 6 (six) hours as needed for Anxiety.    METOPROLOL SUCCINATE (TOPROL-XL) 100 MG 24 HR TABLET    Take 1 tablet (100 mg total) by mouth once daily.    MULTIVITAMIN-CA-IRON-MINERALS 27-0.4 MG TAB    Take by mouth. 1 Tablet Oral Every day    MV,CA,MIN-FOLIC ACID-VIT K1 (ONE-A-DAY WOMEN'S 50 PLUS) 400-20 MCG TAB    Take 1 tablet by mouth.    OLMESARTAN (BENICAR) 40 MG TABLET    Take 1 tablet (40 mg total) by mouth once daily.    ONDANSETRON (ZOFRAN) 8 MG TABLET    Take 1 tablet (8 mg total) by mouth every 8 (eight) hours as needed for Nausea.    SEVELAMER CARBONATE (RENVELA) 800 MG TAB    Take 2 tablets (1,600 mg total) by mouth 3 (three) times daily with meals.          CARE TEAM:  Patient Care Team:  Sowmya Mccain MD as PCP - General (Internal Medicine)  Ben Rivera MD as Consulting Physician (Hematology and Oncology)  Leora Louis MD (Cardiology)  Williams Hart MD as Consulting Physician (Nephrology)  Lulu Le LPN as Licensed Practical Nurse           SCREENING HISTORY:  Health Maintenance       Date Due Completion Date    Hemoglobin A1c 08/26/2021 2/26/2021    Lipid Panel 02/26/2022 2/26/2021    Mammogram 11/12/2022 11/12/2021    Cervical Cancer Screening 11/16/2023 11/16/2018    TETANUS VACCINE 09/23/2025 9/23/2015    Colorectal Cancer Screening 03/12/2028 3/12/2021    Pneumococcal Vaccines (Age 0-64) (4 - PPSV23 or PCV20) 10/01/2029 11/9/2016            REVIEW OF SYSTEMS:   The patient reports: good dietary  "habits.  The patient reports : that they do not exercise regularly  Review of Systems   Constitutional: Negative for chills, fatigue, fever and unexpected weight change.   HENT: Negative for congestion and postnasal drip.    Eyes: Negative for pain and visual disturbance.   Respiratory: Negative for cough, shortness of breath and wheezing.    Cardiovascular: Negative for chest pain, palpitations and leg swelling.   Gastrointestinal: Negative for abdominal pain, constipation, diarrhea, nausea and vomiting.   Genitourinary: Negative for dysuria.   Musculoskeletal: Negative for arthralgias and back pain.   Skin: Negative for rash.   Neurological: Negative for weakness and headaches.   Psychiatric/Behavioral: Negative for dysphoric mood and sleep disturbance. The patient is not nervous/anxious.       ROS (Optional)-: no pelvic pain  Breast ROS (Optional)-: negative for breast lumps/discharge            Physical Examination:   Vitals:    04/18/22 1358   BP: 124/66   Pulse:    Temp:      Weight: 56.3 kg (124 lb 1.9 oz)   Height: 5' 4.96" (165 cm)   Body mass index is 20.68 kg/m².      Patient did not require to have a chaperone present during the exam today.    General appearance - alert, well appearing, and in no distress, normal appearing weight  Psychiatric - alert, oriented to person, place, and time, normal behavior, speech, dress, motor activity and thought process  Eyes - pupils equal and reactive, extraocular eye movements intact, sclera anicteric  Mouth - not examined; patient wearing mask due to Covid 19 pandemic  Chest - clear to auscultation, no wheezes, rales or rhonchi, symmetric air entry  Heart - normal rate and regular rhythm, no gallops noted  Neurological - alert, normal speech, no focal findings or movement disorder noted, cranial nerves II through XII intact  Musculoskeletal - no joint tenderness, deformity or swelling, no muscular tenderness noted  Extremities - peripheral pulses normal, no pedal " edema, no clubbing or cyanosis  Skin - normal coloration and turgor, no rashes, no suspicious skin lesions noted      Labs:  Ordered/Scheduled      ASSESSMENT AND PLAN:  1. Routine medical exam  Counseled on age appropriate medical preventative services including age appropriate cancer screenings, age appropriate eye and dental exams, over all nutritional health, need for a consistent exercise regimen, and an over all push towards maintaining a vigorous and active lifestyle.  Counseled on age appropriate vaccines and discussed upcoming health care needs based on age/gender. Discussed good sleep hygiene and stress management.    2. Primary hypertension/3. Cardiomyopathy, nonischemic/4. Combined systolic and diastolic cardiac dysfunction/5. Pulmonary hypertension  The current medical regimen is effective;  continue present plan and medications.   Followed by: Cardiology.     6. ESRD (end stage renal disease) on dialysis/7. Anemia in ESRD (end-stage renal disease)  The current medical regimen is effective;  continue present plan and medications.   Followed by: Nephrology.     8. Hyperparathyroidism, secondary renal  Stable. Asymptomatic. Observe.    9. History of kidney cancer  Stable. Observe.    10. Glucose intolerance  Stable. We discussed low sugar/low carbohydrate diet and regular exercise to prevent progression. No need for prescription medication at this time.  - Hemoglobin A1C; Future          Orders Placed This Encounter   Procedures    Hemoglobin A1C      Follow up in about 1 year (around 4/18/2023), or if symptoms worsen or fail to improve, for annual exam. or sooner as needed.

## 2022-04-25 ENCOUNTER — TELEPHONE (OUTPATIENT)
Dept: VASCULAR SURGERY | Facility: CLINIC | Age: 57
End: 2022-04-25
Payer: MEDICARE

## 2022-04-25 ENCOUNTER — OFFICE VISIT (OUTPATIENT)
Dept: VASCULAR SURGERY | Facility: CLINIC | Age: 57
End: 2022-04-25
Payer: MEDICARE

## 2022-04-25 ENCOUNTER — HOSPITAL ENCOUNTER (OUTPATIENT)
Dept: VASCULAR SURGERY | Facility: CLINIC | Age: 57
Discharge: HOME | End: 2022-04-25
Attending: SURGERY
Payer: MEDICARE

## 2022-04-25 VITALS
BODY MASS INDEX: 21.07 KG/M2 | TEMPERATURE: 98 F | SYSTOLIC BLOOD PRESSURE: 153 MMHG | WEIGHT: 123.44 LBS | DIASTOLIC BLOOD PRESSURE: 82 MMHG | HEIGHT: 64 IN | HEART RATE: 76 BPM

## 2022-04-25 DIAGNOSIS — N18.6 ESRD (END STAGE RENAL DISEASE) ON DIALYSIS: Primary | ICD-10-CM

## 2022-04-25 DIAGNOSIS — Z99.2 ESRD (END STAGE RENAL DISEASE) ON DIALYSIS: ICD-10-CM

## 2022-04-25 DIAGNOSIS — N18.6 ESRD (END STAGE RENAL DISEASE) ON DIALYSIS: ICD-10-CM

## 2022-04-25 DIAGNOSIS — Z99.2 ESRD (END STAGE RENAL DISEASE) ON DIALYSIS: Primary | ICD-10-CM

## 2022-04-25 PROCEDURE — 93990 PR DUPLEX HEMODIALYSIS ACCESS: ICD-10-PCS | Mod: 26,S$PBB,, | Performed by: SURGERY

## 2022-04-25 PROCEDURE — 99212 PR OFFICE/OUTPT VISIT, EST, LEVL II, 10-19 MIN: ICD-10-PCS | Mod: S$PBB,,, | Performed by: SURGERY

## 2022-04-25 PROCEDURE — 93990 DOPPLER FLOW TESTING: CPT | Mod: PBBFAC | Performed by: SURGERY

## 2022-04-25 PROCEDURE — 99213 OFFICE O/P EST LOW 20 MIN: CPT | Mod: PBBFAC | Performed by: SURGERY

## 2022-04-25 PROCEDURE — 99999 PR PBB SHADOW E&M-EST. PATIENT-LVL III: ICD-10-PCS | Mod: PBBFAC,,, | Performed by: SURGERY

## 2022-04-25 PROCEDURE — 99999 PR PBB SHADOW E&M-EST. PATIENT-LVL III: CPT | Mod: PBBFAC,,, | Performed by: SURGERY

## 2022-04-25 PROCEDURE — 99212 OFFICE O/P EST SF 10 MIN: CPT | Mod: S$PBB,,, | Performed by: SURGERY

## 2022-04-25 PROCEDURE — 93990 DOPPLER FLOW TESTING: CPT | Mod: 26,S$PBB,, | Performed by: SURGERY

## 2022-04-25 NOTE — PROGRESS NOTES
HISTORY OF PRESENT ILLNESS:  A 56 y.o. female with end-stage renal disease,   well known to Dr. Muñoz, status post,  1.  Covered stent placement, left AV fistula outflow tract, 03/06/2017.  2.  Left upper arm 4-7 tapered AV graft on 01/12/2017.    Of note, at the time of surgery was found to have an exceptionally thin and   friable 4 mm brachial vein, which she used outflow and which she returned with   the stenosis that was treated empirically with a Covered stent because of the   risk of rupture.    12/3/2021:  This 6 month follow-up.  She continues to have no trouble using the graft.    4/25/22: INTERVAL HISTORY  56-year-old female here for routine follow-up dialysis access evaluation with ultrasound.  Patient has been able to complete dialysis without issue, however has intermittent increased bleeding after decannulation at the end of some dialysis sessions.  She she has no left upper extremity symptoms.  She has no other complaints today.  Ultrasound from today that demonstrates visual narrowing at the mid graft with corresponding elevated velocities.    PHYSICAL EXAMINATION:  VITAL SIGNS:  See nursing note.  EXTREMITIES:  Left arm demonstrates a upper arm graft with an excellent thrill no significant pulsatility.  Mid section is moderately aneurysmal.  While there is some alopecia the skin is not thinned.  There is no ulceration    IMAGING:  Elevated velocities are noted at the arterial anastomosis a PSV increase of 195 centimeters/second to 494 centimeters/seconds noted in the mid segment of the graft along with the visual narrowing these findings are suggestive of a focal hemodynamically significant stenosis.  The volume flow at the mid graft measures 2179 mL/min.  Elevated velocities noted the outflow vein      ASSESSMENT:  Ms. Bloom is our 57yo Female with left upper arm AV graft with Viabahn stent graft outflow treatment, near 5 years of cumulative patency with only a single intervention. She  presented today with some back bleeding post-HD treatments. Flows are excellent today.    PLAN:    -No need for fistulagram at this time  - RTC in 6 months with duplex ultrasound        Kaitlynn Delcid MD  [facility]

## 2022-04-25 NOTE — TELEPHONE ENCOUNTER
Contacted Markel in response to message. States patient has been experiencing prolonged bleeding and thinning of skin over access and feels she needs to be seen soon. Contacted patient to schedule. Same day appt scheduled with Dr. Daniel.----- Message from Laurel Zuniga sent at 4/25/2022 10:53 AM CDT -----  Contact: Becky([facility])[phone] ext [phone]  Caller requesting a call back regarding [place].  Caller states the patient needs to be seen today or Any Monday, Wednesday, or Friday for prolonged Bleeding after Treatment.  I attempted to schedule the appt but was given 05/10/22 call states she needs to be seen sooner.

## 2022-05-02 ENCOUNTER — TELEPHONE (OUTPATIENT)
Dept: ELECTROPHYSIOLOGY | Facility: CLINIC | Age: 57
End: 2022-05-02
Payer: MEDICARE

## 2022-05-16 ENCOUNTER — PES CALL (OUTPATIENT)
Dept: ADMINISTRATIVE | Facility: CLINIC | Age: 57
End: 2022-05-16
Payer: MEDICARE

## 2022-05-20 ENCOUNTER — TELEPHONE (OUTPATIENT)
Dept: ADMINISTRATIVE | Facility: CLINIC | Age: 57
End: 2022-05-20
Payer: MEDICARE

## 2022-05-23 ENCOUNTER — OFFICE VISIT (OUTPATIENT)
Dept: FAMILY MEDICINE | Facility: CLINIC | Age: 57
End: 2022-05-23
Payer: MEDICARE

## 2022-05-23 VITALS
WEIGHT: 124.69 LBS | TEMPERATURE: 98 F | BODY MASS INDEX: 20.78 KG/M2 | HEIGHT: 65 IN | DIASTOLIC BLOOD PRESSURE: 66 MMHG | HEART RATE: 82 BPM | OXYGEN SATURATION: 95 % | SYSTOLIC BLOOD PRESSURE: 132 MMHG

## 2022-05-23 DIAGNOSIS — D63.1 ANEMIA IN ESRD (END-STAGE RENAL DISEASE): ICD-10-CM

## 2022-05-23 DIAGNOSIS — N25.81 HYPERPARATHYROIDISM, SECONDARY RENAL: ICD-10-CM

## 2022-05-23 DIAGNOSIS — Z00.00 ENCOUNTER FOR PREVENTIVE HEALTH EXAMINATION: Primary | ICD-10-CM

## 2022-05-23 DIAGNOSIS — D69.6 THROMBOCYTOPENIA: ICD-10-CM

## 2022-05-23 DIAGNOSIS — I51.89 COMBINED SYSTOLIC AND DIASTOLIC CARDIAC DYSFUNCTION: ICD-10-CM

## 2022-05-23 DIAGNOSIS — E04.2 MULTINODULAR GOITER: ICD-10-CM

## 2022-05-23 DIAGNOSIS — Z99.2 ESRD (END STAGE RENAL DISEASE) ON DIALYSIS: ICD-10-CM

## 2022-05-23 DIAGNOSIS — I27.20 PULMONARY HYPERTENSION: ICD-10-CM

## 2022-05-23 DIAGNOSIS — Z95.810 ICD (IMPLANTABLE CARDIOVERTER-DEFIBRILLATOR) IN PLACE: ICD-10-CM

## 2022-05-23 DIAGNOSIS — N18.6 ANEMIA IN ESRD (END-STAGE RENAL DISEASE): ICD-10-CM

## 2022-05-23 DIAGNOSIS — Z85.528 HISTORY OF KIDNEY CANCER: ICD-10-CM

## 2022-05-23 DIAGNOSIS — I42.8 CARDIOMYOPATHY, NONISCHEMIC: ICD-10-CM

## 2022-05-23 DIAGNOSIS — N18.6 ESRD (END STAGE RENAL DISEASE) ON DIALYSIS: ICD-10-CM

## 2022-05-23 DIAGNOSIS — Z90.5 S/P NEPHRECTOMY: Chronic | ICD-10-CM

## 2022-05-23 DIAGNOSIS — I50.22 CHF (CONGESTIVE HEART FAILURE), NYHA CLASS II, CHRONIC, SYSTOLIC: ICD-10-CM

## 2022-05-23 DIAGNOSIS — I10 ESSENTIAL HYPERTENSION: ICD-10-CM

## 2022-05-23 DIAGNOSIS — F51.01 PRIMARY INSOMNIA: ICD-10-CM

## 2022-05-23 DIAGNOSIS — I34.0 NONRHEUMATIC MITRAL VALVE REGURGITATION: ICD-10-CM

## 2022-05-23 PROCEDURE — 99215 OFFICE O/P EST HI 40 MIN: CPT | Mod: PBBFAC,PO | Performed by: NURSE PRACTITIONER

## 2022-05-23 PROCEDURE — G0439 PR MEDICARE ANNUAL WELLNESS SUBSEQUENT VISIT: ICD-10-PCS | Mod: ,,, | Performed by: NURSE PRACTITIONER

## 2022-05-23 PROCEDURE — G0439 PPPS, SUBSEQ VISIT: HCPCS | Mod: ,,, | Performed by: NURSE PRACTITIONER

## 2022-05-23 PROCEDURE — 99999 PR PBB SHADOW E&M-EST. PATIENT-LVL V: ICD-10-PCS | Mod: PBBFAC,,, | Performed by: NURSE PRACTITIONER

## 2022-05-23 PROCEDURE — 99999 PR PBB SHADOW E&M-EST. PATIENT-LVL V: CPT | Mod: PBBFAC,,, | Performed by: NURSE PRACTITIONER

## 2022-05-23 NOTE — PROGRESS NOTES
"  Eva Bloom presented for a  Medicare AWV and comprehensive Health Risk Assessment today. The following components were reviewed and updated:    · Medical history  · Family History  · Social history  · Allergies and Current Medications  · Health Risk Assessment  · Health Maintenance  · Care Team       ** See Completed Assessments for Annual Wellness Visit within the encounter summary.**       The following assessments were completed:  · Living Situation  · CAGE  · Depression Screening  · Timed Get Up and Go  · Whisper Test  · Cognitive Function Screening  · Nutrition Screening  · ADL Screening  · PAQ Screening            Vitals:    05/23/22 0820   BP: 132/66   BP Location: Right arm   Patient Position: Sitting   BP Method: Medium (Manual)   Pulse: 82   Temp: 97.8 °F (36.6 °C)   TempSrc: Oral   SpO2: 95%   Weight: 56.5 kg (124 lb 10.7 oz)   Height: 5' 5.35" (1.66 m)     Body mass index is 20.52 kg/m².  Physical Exam  Vitals and nursing note reviewed.   Constitutional:       Appearance: Normal appearance.   Cardiovascular:      Rate and Rhythm: Normal rate.      Pulses: Normal pulses.      Heart sounds: Normal heart sounds.   Pulmonary:      Effort: Pulmonary effort is normal.      Breath sounds: Normal breath sounds.   Abdominal:      General: Bowel sounds are normal.      Palpations: Abdomen is soft.   Musculoskeletal:         General: Normal range of motion.   Skin:         Neurological:      Mental Status: She is alert and oriented to person, place, and time.   Psychiatric:         Mood and Affect: Mood normal.         Behavior: Behavior normal.             Diagnoses and health risks identified today and associated recommendations/orders:    1. Encounter for preventive health examination  Pt, accompanied by her , was seen today for an Annual Wellness visit. Healthcare maintenance and screening recommendations were discussed and updated as indicated. Return in one year for AWV.    Review current opioid " prescriptions: n/a  Screen for potential Substance Use Disorders: n/a    2. Cardiomyopathy, nonischemic  The current medical regimen is effective;  continue present plan and medications.    3. CHF (congestive heart failure), NYHA class II, chronic, systolic  Stable. The current medical regimen is effective;  continue present plan and medications.    4. Pulmonary hypertension  The current medical regimen is effective;  continue present plan and medications.    5. ESRD (end stage renal disease) on dialysis  Stated dialysis days are Tues, Thurs, Sats. The current medical regimen is effective;  continue present plan and medications.    6. Anemia in ESRD (end-stage renal disease)  The current medical regimen is effective;  continue present plan and medications.    7. Hyperparathyroidism, secondary renal  The current medical regimen is effective;  continue present plan and medications.    8. Thrombocytopenia  The current medical regimen is effective;  continue present plan and medications.    9. Essential hypertension  The current medical regimen is effective;  continue present plan and medications.    10. Combined systolic and diastolic cardiac dysfunction  The current medical regimen is effective;  continue present plan and medications.    11. ICD (implantable cardioverter-defibrillator) in place - SubQ  The current medical regimen is effective;  continue present plan and medications.    12. Nonrheumatic mitral valve regurgitation  The current medical regimen is effective;  continue present plan and medications.    13. Multinodular goiter  The current medical regimen is effective;  continue present plan and medications.    14. Primary insomnia  The current medical regimen is effective;  continue present plan and medications.    15. History of kidney cancer  The current medical regimen is effective;  continue present plan and medications.    16. S/p nephrectomy left  The current medical regimen is effective;  continue  present plan and medications.        Provided Barry with a 5-10 year written screening schedule and personal prevention plan. Recommendations were developed using the USPSTF age appropriate recommendations. Education, counseling, and referrals were provided as needed. After Visit Summary printed and given to patient which includes a list of additional screenings\tests needed.    Follow up in about 11 months (around 4/18/2023) with provider.    TONA Nagy

## 2022-05-25 NOTE — PATIENT INSTRUCTIONS
Counseling and Referral of Other Preventative  (Italic type indicates deductible and co-insurance are waived)    Patient Name: Eva Bloom  Today's Date: 5/25/2022    Health Maintenance       Date Due Completion Date    Hemoglobin A1c 08/26/2021 2/26/2021    Lipid Panel 02/26/2022 2/26/2021    COVID-19 Vaccine (4 - Booster for Moderna series) 03/11/2022 11/11/2021    Mammogram 11/12/2022 11/12/2021    Cervical Cancer Screening 11/16/2023 11/16/2018    TETANUS VACCINE 09/23/2025 9/23/2015    Colorectal Cancer Screening 03/12/2028 3/12/2021    Pneumococcal Vaccines (Age 0-64) (4 - PPSV23 or PCV20) 10/01/2029 11/9/2016        No orders of the defined types were placed in this encounter.    The following information is provided to all patients.  This information is to help you find resources for any of the problems found today that may be affecting your health:                Living healthy guide: www.Counts include 234 beds at the [facility].[place]      Understanding Diabetes: www.diabetes.org      Eating healthy: www.cdc.gov/healthyweight      [facility] home safety checklist: www.cdc.gov/steadi/patient.html      Agency on Aging: www.goea.[place].gov      Alcoholics anonymous (AA): www.aa.org      Physical Activity: www.dacia.nih.gov/ek7rcjs      Tobacco use: www.quitwithusla.org     Counseling and Referral of Other Preventative  (Italic type indicates deductible and co-insurance are waived)    Patient Name: Eva Bloom  Today's Date: 6/7/2022    Health Maintenance       Date Due Completion Date    Hemoglobin A1c 08/26/2021 2/26/2021    Lipid Panel 02/26/2022 2/26/2021    COVID-19 Vaccine (4 - Booster for Moderna series) 03/11/2022 11/11/2021    Mammogram 11/12/2022 11/12/2021    Cervical Cancer Screening 11/16/2023 11/16/2018    TETANUS VACCINE 09/23/2025 9/23/2015    Colorectal Cancer Screening 03/12/2028 3/12/2021    Pneumococcal Vaccines (Age 0-64) (4 - PPSV23 or PCV20) 10/01/2029 11/9/2016        No orders of the defined types were placed in this  encounter.    The following information is provided to all patients.  This information is to help you find resources for any of the problems found today that may be affecting your health:                Living healthy guide: www.[facility]      Understanding Diabetes: www.diabetes.org      Eating healthy: www.cdc.gov/healthyweight      CDC home safety checklist: www.cdc.gov/steadi/patient.html      Agency on Aging: www.goea.[place].[facility]      Alcoholics anonymous (AA): www.aa.org      Physical Activity: www.dacia.nih.gov/aa2yrru      Tobacco use: www.quitwithusla.org

## 2022-06-06 ENCOUNTER — HOSPITAL ENCOUNTER (EMERGENCY)
Facility: HOSPITAL | Age: 57
Discharge: HOME | End: 2022-06-06
Attending: EMERGENCY MEDICINE
Payer: COMMERCIAL

## 2022-06-06 VITALS
WEIGHT: 124 LBS | BODY MASS INDEX: 21.17 KG/M2 | HEART RATE: 69 BPM | HEIGHT: 64 IN | OXYGEN SATURATION: 98 % | TEMPERATURE: 98 F | RESPIRATION RATE: 18 BRPM | SYSTOLIC BLOOD PRESSURE: 132 MMHG | DIASTOLIC BLOOD PRESSURE: 64 MMHG

## 2022-06-06 DIAGNOSIS — R04.0 ACUTE ANTERIOR EPISTAXIS: Primary | ICD-10-CM

## 2022-06-06 PROCEDURE — 99282 EMERGENCY DEPT VISIT SF MDM: CPT

## 2022-06-06 PROCEDURE — 25000003 PHARM REV CODE 250: Performed by: PHYSICIAN ASSISTANT

## 2022-06-06 RX ORDER — OXYMETAZOLINE HCL 0.05 %
2 SPRAY, NON-AEROSOL (ML) NASAL
Status: COMPLETED | OUTPATIENT
Start: 2022-06-06 | End: 2022-06-06

## 2022-06-06 RX ORDER — OXYMETAZOLINE HCL 0.05 %
1 SPRAY, NON-AEROSOL (ML) NASAL 2 TIMES DAILY
Qty: 15 ML | Refills: 0 | Status: SHIPPED | OUTPATIENT
Start: 2022-06-06 | End: 2022-06-09

## 2022-06-06 RX ADMIN — Medication 2 SPRAY: at 12:06

## 2022-06-06 NOTE — ED TRIAGE NOTES
Pt c/o epistaxis since last night, (blood clots), mainly from the right nostril.History of epistaxis. Denies blood thinners. History of HTN, CKD, cancer, and dialysis

## 2022-06-06 NOTE — ED PROVIDER NOTES
Encounter Date: 6/6/2022    SCRIBE #1 NOTE: IAnastacio, abdelrahman scribing for, and in the presence of,  Augustin Cullen PA-C. I have scribed the following portions of the note - Other sections scribed: HPI, ROS.       History     Chief Complaint   Patient presents with    Epistaxis     Pt c/o epistaxis since last night, (blood clots), mainly from the right nostril.History of epistaxis. Denies blood thinners. History of HTN, CKD, cancer, and dialysis.     CC: Nosebleed    HPI: This is a 56 y.o. F who presents to the ED for emergent evaluation of acute nosebleed that began last night. Pt states that the nosebleed spontaneously resolved last night, but reoccurred this morning. Pt states that the recurrent nosebleed spontaneously resolved PTA. She reports that the current episodes of nosebleeds consisted of blood clots and not a gushing of blood similar to the previous nosebleed last year. The pt reports requiring a rhino rocket for the previous nosebleed last year. No OTC treatment attempted for the current nosebleed. Pt takes blood pressure medication. She is not on blood thinners. Pt denies nasal trauma.    The history is provided by the patient. No  was used.     Review of patient's allergies indicates:   Allergen Reactions    Coreg [carvedilol] Other (See Comments)     Nausea/vomiting    Allopurinol      Other reaction(s): abnormal transaminases     Past Medical History:   Diagnosis Date    Anemia     Bronchitis 03/01/2017    Cancer 2016    kidney cancer    CHF (congestive heart failure), NYHA class II, chronic, systolic     CMV (cytomegalovirus) antibody positive     Essential hypertension 9/23/2015    H/O herpes simplex type 2 infection     Herpes simplex type 1 antibody positive     History of kidney cancer     s/p left nephrectomy 1/2016    Hyperparathyroidism, unspecified     Hyperuricemia without signs of inflammatory arthritis and tophaceous disease     Kidney stones      LGSIL (low grade squamous intraepithelial dysplasia)     Myocardiopathy 7/21/2017    Prediabetes     Proteinuria     Renal disorder     Thyroid nodule     Urate nephropathy      Past Surgical History:   Procedure Laterality Date    BREAST CYST EXCISION      COLONOSCOPY N/A 11/12/2015    COLONOSCOPY N/A 3/12/2021    Procedure: COLONOSCOPY;  Surgeon: Brendon Lanier MD;  Location: [facility] ENDO;  Service: Endoscopy;  Laterality: N/A;  covid test 3/9, labs prior, prep instr mailed -ml    INSERTION OF BIVENTRICULAR IMPLANTABLE CARDIOVERTER-DEFIBRILLATOR (ICD)  04/2021    NEPHRECTOMY-LAPAROSCOPIC Left 01/12/2016    PERITONEAL CATHETER INSERTION      Permacath insertion  01/12/2017    SALPINGOOPHORECTOMY Right 2016    KJB---DAVINCI    TONSILLECTOMY      TUBAL LIGATION       Family History   Problem Relation Age of Onset    Hypertension Mother     Diabetes Father     Kidney disease Father     No Known Problems Son     No Known Problems Son     Diabetes Maternal Grandfather     No Known Problems Sister     No Known Problems Brother     No Known Problems Maternal Grandmother     No Known Problems Paternal Grandmother     No Known Problems Paternal Grandfather     Heart disease Sister     No Known Problems Sister     No Known Problems Brother     No Known Problems Maternal Aunt     No Known Problems Maternal Uncle     No Known Problems Paternal Aunt     No Known Problems Paternal Uncle     Breast cancer Neg Hx     Colon cancer Neg Hx     Cancer Neg Hx     Stroke Neg Hx     Amblyopia Neg Hx     Blindness Neg Hx     Cataracts Neg Hx     Glaucoma Neg Hx     Macular degeneration Neg Hx     Retinal detachment Neg Hx     Strabismus Neg Hx     Thyroid disease Neg Hx      Social History     Tobacco Use    Smoking status: Never Smoker    Smokeless tobacco: Never Used   Substance Use Topics    Alcohol use: No     Comment: . 2 children. works at [employer].    Drug use: No     Review  of Systems   Constitutional: Negative for chills and fever.   HENT: Positive for nosebleeds (resolved). Negative for sore throat.    Eyes: Negative for visual disturbance.   Respiratory: Negative for cough and shortness of breath.    Cardiovascular: Negative for chest pain.   Gastrointestinal: Negative for abdominal pain, diarrhea, nausea and vomiting.   Genitourinary: Negative for dysuria.   Musculoskeletal: Negative for back pain and myalgias.   Skin: Negative for rash.   Neurological: Negative for dizziness, weakness, light-headedness and headaches.   Hematological: Does not bruise/bleed easily.       Physical Exam     Initial Vitals [06/06/22 1149]   BP Pulse Resp Temp SpO2   (!) 145/70 73 16 98.3 °F (36.8 °C) 96 %      MAP       --         Physical Exam    Nursing note and vitals reviewed.  Constitutional: She appears well-developed and well-nourished. She is not diaphoretic. No distress.   HENT:   Head: Atraumatic.   Right Ear: External ear normal.   Left Ear: External ear normal.   Dry blood to the right naris.  No active bleeding or septal hematoma.   Eyes: Conjunctivae and EOM are normal.   Neck: No tracheal deviation present.   Normal range of motion.  Cardiovascular: Normal rate and regular rhythm.   Pulmonary/Chest: No accessory muscle usage or stridor. No tachypnea. No respiratory distress.   Musculoskeletal:         General: No edema. Normal range of motion.      Cervical back: Normal range of motion.     Neurological: She is alert and oriented to person, place, and time. She displays no tremor. She displays no seizure activity. Coordination and gait normal.   Skin: Skin is intact. Capillary refill takes less than 2 seconds. No rash noted. No erythema.         ED Course   Procedures  Labs Reviewed - No data to display       Imaging Results    None          Medications   oxymetazoline 0.05 % nasal spray 2 spray (2 sprays Each Nostril Given 6/6/22 1218)     Medical Decision Making:   History:   Old  Medical Records: I decided to obtain old medical records.  ED Management:  Epistaxis prior to arrival that resolved spontaneously.  No active bleeding in the ED.  no history of trauma.  No anticoagulant use.  Low suspicion for bleeding dyscrasia.  patient reassured.  We discussed supportive measures.  Advising follow-up with PCP. Strict return precautions discussed.  Agreeable to plan.          Scribe Attestation:   Scribe #1: I performed the above scribed service and the documentation accurately describes the services I performed. I attest to the accuracy of the note.               Scribe Attestation:     I,Augustin Cullen PA-C,personally performed the services described in this documentation. All medical record entries made by the scribe were at my direction and in my presence.  I have reviewed the chart and agree that the record reflects my personal performance and is accurate and complete.    Clinical Impression:   Final diagnoses:  [R04.0] Acute anterior epistaxis (Primary)          ED Disposition Condition    Discharge Stable        ED Prescriptions     Medication Sig Dispense Start Date End Date Auth. Provider    oxymetazoline (AFRIN) 0.05 % nasal spray 1 spray by Nasal route 2 (two) times daily. DO NOT EXCEED USE FOR MORE THAN 3 DAYS IN A ROW. for 3 days 15 mL 6/6/2022 6/9/2022 Augustin Cullen PA-C        Follow-up Information     Follow up With Specialties Details Why Contact Info    Sowmya Mccain MD Internal Medicine, Pediatrics Schedule an appointment as soon as possible for a visit in 1 day For re-evaluation [address] [facility] [place] [address]  [phone]      [facility] Otolaryngology Schedule an appointment as soon as possible for a visit in 1 day For further evaluation, For more definitive management of your symptoms [address] [place] [id number] [phone]    [facility] Surgical Oncology, Orthopedic Surgery, Genetics, Physical Medicine and  Rehabilitation, Occupational Therapy, Radiology Go in 1 day as an alternative to specialist evaluation [address] [facility] [address]  [phone]      [facility] Emergency Dept Emergency Medicine Go to  If symptoms worsen [address]  [facility] [address] [phone]           Augustin Cullen PA-C  06/06/22 1522

## 2022-06-09 ENCOUNTER — HOSPITAL ENCOUNTER (EMERGENCY)
Facility: HOSPITAL | Age: 57
Discharge: HOME | End: 2022-06-09
Attending: EMERGENCY MEDICINE
Payer: MEDICARE

## 2022-06-09 VITALS
HEIGHT: 64 IN | SYSTOLIC BLOOD PRESSURE: 148 MMHG | DIASTOLIC BLOOD PRESSURE: 77 MMHG | RESPIRATION RATE: 17 BRPM | WEIGHT: 125 LBS | TEMPERATURE: 98 F | BODY MASS INDEX: 21.34 KG/M2 | OXYGEN SATURATION: 98 % | HEART RATE: 79 BPM

## 2022-06-09 DIAGNOSIS — N18.6 END STAGE RENAL DISEASE ON DIALYSIS: ICD-10-CM

## 2022-06-09 DIAGNOSIS — I10 UNCONTROLLED HYPERTENSION: ICD-10-CM

## 2022-06-09 DIAGNOSIS — R04.0 RECURRENT EPISTAXIS: Primary | ICD-10-CM

## 2022-06-09 DIAGNOSIS — Z99.2 END STAGE RENAL DISEASE ON DIALYSIS: ICD-10-CM

## 2022-06-09 LAB
BASOPHILS # BLD AUTO: 0.05 K/UL (ref 0–0.2)
BASOPHILS NFR BLD: 1.2 % (ref 0–1.9)
DIFFERENTIAL METHOD: ABNORMAL
EOSINOPHIL # BLD AUTO: 0.1 K/UL (ref 0–0.5)
EOSINOPHIL NFR BLD: 3.4 % (ref 0–8)
ERYTHROCYTE [DISTWIDTH] IN BLOOD BY AUTOMATED COUNT: 17.8 % (ref 11.5–14.5)
HCT VFR BLD AUTO: 30 % (ref 37–48.5)
HGB BLD-MCNC: 9.7 G/DL (ref 12–16)
IMM GRANULOCYTES # BLD AUTO: 0.01 K/UL (ref 0–0.04)
IMM GRANULOCYTES NFR BLD AUTO: 0.2 % (ref 0–0.5)
INR PPP: 1.1 (ref 0.8–1.2)
LYMPHOCYTES # BLD AUTO: 0.9 K/UL (ref 1–4.8)
LYMPHOCYTES NFR BLD: 21.1 % (ref 18–48)
MCH RBC QN AUTO: 34.2 PG (ref 27–31)
MCHC RBC AUTO-ENTMCNC: 32.3 G/DL (ref 32–36)
MCV RBC AUTO: 106 FL (ref 82–98)
MONOCYTES # BLD AUTO: 0.4 K/UL (ref 0.3–1)
MONOCYTES NFR BLD: 10.1 % (ref 4–15)
NEUTROPHILS # BLD AUTO: 2.7 K/UL (ref 1.8–7.7)
NEUTROPHILS NFR BLD: 64 % (ref 38–73)
NRBC BLD-RTO: 0 /100 WBC
PLATELET # BLD AUTO: 161 K/UL (ref 150–450)
PMV BLD AUTO: 10.6 FL (ref 9.2–12.9)
PROTHROMBIN TIME: 11.4 SEC (ref 9–12.5)
RBC # BLD AUTO: 2.84 M/UL (ref 4–5.4)
WBC # BLD AUTO: 4.17 K/UL (ref 3.9–12.7)

## 2022-06-09 PROCEDURE — 99283 EMERGENCY DEPT VISIT LOW MDM: CPT

## 2022-06-09 PROCEDURE — 25000003 PHARM REV CODE 250: Performed by: EMERGENCY MEDICINE

## 2022-06-09 PROCEDURE — 85610 PROTHROMBIN TIME: CPT | Performed by: EMERGENCY MEDICINE

## 2022-06-09 PROCEDURE — 85025 COMPLETE CBC W/AUTO DIFF WBC: CPT | Performed by: EMERGENCY MEDICINE

## 2022-06-09 RX ORDER — AMLODIPINE BESYLATE 5 MG/1
5 TABLET ORAL
Status: COMPLETED | OUTPATIENT
Start: 2022-06-09 | End: 2022-06-09

## 2022-06-09 RX ORDER — HYDRALAZINE HYDROCHLORIDE 25 MG/1
100 TABLET, FILM COATED ORAL
Status: COMPLETED | OUTPATIENT
Start: 2022-06-09 | End: 2022-06-09

## 2022-06-09 RX ORDER — ISOSORBIDE MONONITRATE 30 MG/1
30 TABLET, EXTENDED RELEASE ORAL DAILY
Status: DISCONTINUED | OUTPATIENT
Start: 2022-06-09 | End: 2022-06-09

## 2022-06-09 RX ORDER — LOSARTAN POTASSIUM 25 MG/1
100 TABLET ORAL
Status: COMPLETED | OUTPATIENT
Start: 2022-06-09 | End: 2022-06-09

## 2022-06-09 RX ORDER — METOPROLOL SUCCINATE 50 MG/1
100 TABLET, EXTENDED RELEASE ORAL
Status: COMPLETED | OUTPATIENT
Start: 2022-06-09 | End: 2022-06-09

## 2022-06-09 RX ADMIN — HYDRALAZINE HYDROCHLORIDE 100 MG: 25 TABLET, FILM COATED ORAL at 11:06

## 2022-06-09 RX ADMIN — METOPROLOL SUCCINATE 100 MG: 50 TABLET, EXTENDED RELEASE ORAL at 11:06

## 2022-06-09 RX ADMIN — LOSARTAN POTASSIUM 100 MG: 25 TABLET, FILM COATED ORAL at 11:06

## 2022-06-09 RX ADMIN — AMLODIPINE BESYLATE 5 MG: 5 TABLET ORAL at 11:06

## 2022-06-09 NOTE — ED PROVIDER NOTES
Encounter Date: 6/9/2022    SCRIBE #1 NOTE: I, Alo Carroll, am scribing for, and in the presence of, Leeroy Nix MD.       History     Chief Complaint   Patient presents with    Epistaxis     Pt was finishing up her dialysis treatment when her right camacho began bleeding. Pt has a history of epistaxis in October 2021, again earlier this week and once more today. Pt denies using blood thinners, headache, blurred vision, or other associated symptoms.      Eva Bloom is a 56 y.o. female with a PMHx of HTN, anemia, CHF, ESRD who presents to the Emergency Department for evaluation of a 15 minute episode of epistaxis from the right nares that began after dialysis this morning and has since resolved. Patient is accompanied by her  who reports that patient began having frequent nosebleeds starting in October of last year. Patient states her last nosebleed prior to today occurred 3 days ago. She explains that she receives heparin during dialysis, but is not on any other blood thinners. Patinet states that she has not yet taken her daily medications. She denies cough, shortness of breath, chest pain, fever, chills, abdominal pain, nausea, vomiting, diarrhea, dysuria, headaches, congestion, sore throat, arm or leg trouble, eye pain, ear pain, rash, or any other associated symptoms.      The history is provided by the patient and the spouse.     Review of patient's allergies indicates:   Allergen Reactions    Coreg [carvedilol] Other (See Comments)     Nausea/vomiting    Allopurinol      Other reaction(s): abnormal transaminases     Past Medical History:   Diagnosis Date    Anemia     Bronchitis 03/01/2017    Cancer 2016    kidney cancer    CHF (congestive heart failure), NYHA class II, chronic, systolic     CMV (cytomegalovirus) antibody positive     Essential hypertension 9/23/2015    H/O herpes simplex type 2 infection     Herpes simplex type 1 antibody positive     History of kidney cancer      s/p left nephrectomy 1/2016    Hyperparathyroidism, unspecified     Hyperuricemia without signs of inflammatory arthritis and tophaceous disease     Kidney stones     LGSIL (low grade squamous intraepithelial dysplasia)     Myocardiopathy 7/21/2017    Prediabetes     Proteinuria     Renal disorder     Thyroid nodule     Urate nephropathy      Past Surgical History:   Procedure Laterality Date    BREAST CYST EXCISION      COLONOSCOPY N/A 11/12/2015    COLONOSCOPY N/A 3/12/2021    Procedure: COLONOSCOPY;  Surgeon: Brendon Lanier MD;  Location: [facility];  Service: Endoscopy;  Laterality: N/A;  covid test 3/9, labs prior, prep instr mailed -ml    INSERTION OF BIVENTRICULAR IMPLANTABLE CARDIOVERTER-DEFIBRILLATOR (ICD)  04/2021    NEPHRECTOMY-LAPAROSCOPIC Left 01/12/2016    PERITONEAL CATHETER INSERTION      Permacath insertion  01/12/2017    SALPINGOOPHORECTOMY Right 2016    KJB---DAVINCI    TONSILLECTOMY      TUBAL LIGATION       Family History   Problem Relation Age of Onset    Hypertension Mother     Diabetes Father     Kidney disease Father     No Known Problems Son     No Known Problems Son     Diabetes Maternal Grandfather     No Known Problems Sister     No Known Problems Brother     No Known Problems Maternal Grandmother     No Known Problems Paternal Grandmother     No Known Problems Paternal Grandfather     Heart disease Sister     No Known Problems Sister     No Known Problems Brother     No Known Problems Maternal Aunt     No Known Problems Maternal Uncle     No Known Problems Paternal Aunt     No Known Problems Paternal Uncle     Breast cancer Neg Hx     Colon cancer Neg Hx     Cancer Neg Hx     Stroke Neg Hx     Amblyopia Neg Hx     Blindness Neg Hx     Cataracts Neg Hx     Glaucoma Neg Hx     Macular degeneration Neg Hx     Retinal detachment Neg Hx     Strabismus Neg Hx     Thyroid disease Neg Hx      Social History     Tobacco Use    Smoking  status: Never Smoker    Smokeless tobacco: Never Used   Substance Use Topics    Alcohol use: No     Comment: . 2 children. works at [employer].    Drug use: No     Review of Systems   Constitutional: Negative for chills and fever.   HENT: Positive for nosebleeds. Negative for congestion, ear pain and sore throat.    Eyes: Negative for pain.   Respiratory: Negative for cough and shortness of breath.    Cardiovascular: Negative for chest pain.   Gastrointestinal: Negative for abdominal pain, diarrhea, nausea and vomiting.   Genitourinary: Negative for dysuria.   Musculoskeletal: Negative for myalgias.   Skin: Negative for rash.   Neurological: Negative for headaches.       Physical Exam     Initial Vitals [06/09/22 0945]   BP Pulse Resp Temp SpO2   (!) 176/84 74 16 97.7 °F (36.5 °C) 96 %      MAP       --         Physical Exam  The patient was examined specifically for the following:   General:No significant distress, Good color, Warm and dry. Head and neck:Scalp atraumatic, Neck supple. Neurological:Appropriate conversation, Gross motor deficits. Eyes:Conjugate gaze, Clear corneas. ENT: No epistaxis. Cardiac: Regular rate and rhythm, Grossly normal heart tones. Pulmonary: Wheezing, Rales. Gastrointestinal: Abdominal tenderness, Abdominal distention. Musculoskeletal: Extremity deformity, Apparent pain with range of motion of the joints. Skin: Rash.   The findings on examination were normal except for the following:  The patient has fresh blood on her shirt.  There is no active epistaxis at this time.  Patient's blood pressure is 176/84.  Lungs are clear.  The heart tones are normal.  The abdomen is soft.  The extremities are nontender there is no apparent pain with range of motion any joints.  The patient has no rash.   ED Course   Procedures  Labs Reviewed   CBC W/ AUTO DIFFERENTIAL - Abnormal; Notable for the following components:       Result Value    RBC 2.84 (*)     Hemoglobin 9.7 (*)     Hematocrit 30.0  (*)      (*)     MCH 34.2 (*)     RDW 17.8 (*)     Lymph # 0.9 (*)     All other components within normal limits   PROTIME-INR          Imaging Results    None          Medications   losartan tablet 100 mg (100 mg Oral Given 6/9/22 1105)   metoprolol succinate (TOPROL-XL) 24 hr tablet 100 mg (100 mg Oral Given 6/9/22 1106)   hydrALAZINE tablet 100 mg (100 mg Oral Given 6/9/22 1106)   amLODIPine tablet 5 mg (5 mg Oral Given 6/9/22 1106)     Medical Decision Making:   History:   Old Medical Records: I decided to obtain old medical records.  Clinical Tests:   Lab Tests: Reviewed and Ordered  Given the above this patient presents to the emergency with epistaxis immediately after dialysis.  The patient does have an elevated blood pressure 178/82.  The patient was given her morning blood pressure medicines which she had not taken.  There was no active bleeding in the emergency room.  This is recurrent phenomenon.  I will refer to ENT.  The patient will discuss heparinization before dialysis with her dialysis physician.  I will have her return if she gets worse or if new problems develop.  The patient has a platelet count of a 123,000.  PT INR normal.  The lungs are clear.  The heart tones are normal.  The abdomen is soft.  Patient is in no distress at discharge        Scribe Attestation:   Scribe #1: I performed the above scribed service and the documentation accurately describes the services I performed. I attest to the accuracy of the note.                 Clinical Impression:   Final diagnoses:  [R04.0] Recurrent epistaxis (Primary)  [N18.6, Z99.2] End stage renal disease on dialysis  [I10] Uncontrolled hypertension          ED Disposition Condition    Discharge Stable        ED Prescriptions     None        Follow-up Information     Follow up With Specialties Details Why Contact Info    Knadis Cole MD Otolaryngology In 1 week  [address] [place] [id number] [phone]      Albina Bermudez MD  Nephrology In 1 week  [address] [place] [address] [address]  [place] [address]  [phone]           I personally performed the services described in this documentation.  All medical record  entries made by the scribe are at my direction and in my presence.  Signed, Dr. Dinah Nix MD  06/09/22 7436

## 2022-06-09 NOTE — ED NOTES
Pt ambulatory to discharge, no distress noted. Pt verbalized understanding of symptoms to monitor for that would warrant return to the ED, she also verbalized understanding to f/u with nephrology. All personal belongings with the patient at the time of discharge.

## 2022-06-09 NOTE — DISCHARGE INSTRUCTIONS
Please take her blood pressure medicines exactly as prescribed.  You may use Afrin if you develop nose bleed along with pinching her nose.  Please follow-up with ENT doctor above.  Please discuss heparinization before dialysis with your nephrologist.  Return immediately if you get worse or if new problems develop.  You may follow up with the nephrologist above.

## 2022-06-10 ENCOUNTER — TELEPHONE (OUTPATIENT)
Dept: OTOLARYNGOLOGY | Facility: CLINIC | Age: 57
End: 2022-06-10
Payer: MEDICARE

## 2022-06-10 ENCOUNTER — HOSPITAL ENCOUNTER (EMERGENCY)
Facility: HOSPITAL | Age: 57
Discharge: HOME | End: 2022-06-10
Attending: EMERGENCY MEDICINE
Payer: MEDICARE

## 2022-06-10 VITALS
TEMPERATURE: 98 F | OXYGEN SATURATION: 98 % | DIASTOLIC BLOOD PRESSURE: 68 MMHG | BODY MASS INDEX: 21.34 KG/M2 | SYSTOLIC BLOOD PRESSURE: 140 MMHG | HEIGHT: 64 IN | WEIGHT: 125 LBS | RESPIRATION RATE: 17 BRPM | HEART RATE: 66 BPM

## 2022-06-10 DIAGNOSIS — R04.0 RIGHT-SIDED EPISTAXIS: Primary | ICD-10-CM

## 2022-06-10 PROCEDURE — 99284 EMERGENCY DEPT VISIT MOD MDM: CPT

## 2022-06-10 PROCEDURE — 25000003 PHARM REV CODE 250: Performed by: EMERGENCY MEDICINE

## 2022-06-10 RX ORDER — ADHESIVE TAPE 1.5"X360"
1 TAPE, NON-MEDICATED TOPICAL 2 TIMES DAILY
Qty: 14.1 G | Refills: 0 | Status: ON HOLD | OUTPATIENT
Start: 2022-06-10 | End: 2022-06-29 | Stop reason: HOSPADM

## 2022-06-10 RX ORDER — SILVER NITRATE 38.21; 12.74 MG/1; MG/1
1 STICK TOPICAL
Status: COMPLETED | OUTPATIENT
Start: 2022-06-10 | End: 2022-06-10

## 2022-06-10 RX ORDER — LIDOCAINE HYDROCHLORIDE 20 MG/ML
JELLY TOPICAL
Status: COMPLETED | OUTPATIENT
Start: 2022-06-10 | End: 2022-06-10

## 2022-06-10 RX ORDER — OXYMETAZOLINE HCL 0.05 %
1 SPRAY, NON-AEROSOL (ML) NASAL
Status: CANCELLED | OUTPATIENT
Start: 2022-06-10 | End: 2022-06-10

## 2022-06-10 RX ORDER — LIDOCAINE HYDROCHLORIDE AND EPINEPHRINE 10; 10 MG/ML; UG/ML
1 INJECTION, SOLUTION INFILTRATION; PERINEURAL
Status: COMPLETED | OUTPATIENT
Start: 2022-06-10 | End: 2022-06-10

## 2022-06-10 RX ADMIN — LIDOCAINE HYDROCHLORIDE 10 ML: 20 JELLY TOPICAL at 04:06

## 2022-06-10 RX ADMIN — BACITRACIN ZINC, NEOMYCIN, POLYMYXIN B 1 EACH: 400; 3.5; 5 OINTMENT TOPICAL at 04:06

## 2022-06-10 RX ADMIN — LIDOCAINE HYDROCHLORIDE,EPINEPHRINE BITARTRATE 1 ML: 10; .01 INJECTION, SOLUTION INFILTRATION; PERINEURAL at 04:06

## 2022-06-10 RX ADMIN — SILVER NITRATE APPLICATORS 1 APPLICATOR: 25; 75 STICK TOPICAL at 04:06

## 2022-06-10 NOTE — DISCHARGE INSTRUCTIONS
Use nasal saline gel twice daily to keep nasal passages moist.  Use nasal saline spray as needed for dryness.  Use Afrin and apply pressure for recurrent nasal bleeding.  Return to the emergency department for nasal bleeding not improved by Afrin and pressure.  Follow-up with ENT.  Return to the emergency department for any new, worsening or significantly concerning symptoms.    Thank you for coming to our Emergency Department today. It is important to remember that some problems are difficult to diagnose and may not be found during your first visit. Be sure to follow up with your primary care doctor and review any labs/imaging that was performed with them. If you do not have a primary care doctor, you may contact the one listed on your discharge paperwork or you may also call the [facility] Appointment Desk at [phone] to schedule an appointment with one.     All medications may potentially have side effects and it is impossible to predict which medications may give you side effects. If you feel that you are having a negative effect of any medication you should immediately stop taking them and seek medical attention.    Return to the ER with any questions/concerns, new/concerning symptoms, worsening or failure to improve. Do not drive or make any important decisions for 24 hours if you have received any pain medications, sedatives or mood altering drugs during your ER visit.

## 2022-06-10 NOTE — TELEPHONE ENCOUNTER
----- Message from Sarah Tellez RN sent at 6/10/2022  1:22 PM CDT -----  Regarding: FW: Self/ [phone]/ spouse [phone]    ----- Message -----  From: Aurea Bernabe  Sent: 6/10/2022   1:17 PM CDT  To: [facility] Staff  Subject: Self/ [phone]/ spouse [phone]          Type: Patient Call Back    Who called:  Patient and spouse    What is the request in detail:  Patient would like a call from staff regarding a sooner appt please.  Her  is taking her back to the ED now, but if someone can give them a call rebeccamarciey would really appreciate that.  Call spouse phone.  Thank you    Would the patient rather a call back or a response via [facility]?  Call back    Best call back number:  [phone]/ spouse [phone]          Thank you

## 2022-06-10 NOTE — ED PROVIDER NOTES
Encounter Date: 6/10/2022    SCRIBE #1 NOTE: IAlo, am scribing for, and in the presence of, Reynold Ray MD.       History     Chief Complaint   Patient presents with    Epistaxis     Patient c/o nosebleed x 5 days, seen here in ED Monday and Thursday for nosebleed. Bleeding continuous since 1000. Dialysis pt, Hx HTN   Denies lightheadness/dizziness, fatigue, n/v/d,CHENG      Eva Bloom is a 56 y.o. female with a PMHx of HTN, anemia, CHF, ESRD who presents to the Emergency Department for evaluation of constant epistaxis from the right nares that began at 1000 this morning. Patient denies any attempted treatments. Patient was seen in this facility yesterday with similar complaints. She denies any other associated symptoms at this time.    The history is provided by the patient.     Review of patient's allergies indicates:   Allergen Reactions    Coreg [carvedilol] Other (See Comments)     Nausea/vomiting    Allopurinol      Other reaction(s): abnormal transaminases     Past Medical History:   Diagnosis Date    Anemia     Bronchitis 03/01/2017    Cancer 2016    kidney cancer    CHF (congestive heart failure), NYHA class II, chronic, systolic     CMV (cytomegalovirus) antibody positive     Essential hypertension 9/23/2015    H/O herpes simplex type 2 infection     Herpes simplex type 1 antibody positive     History of kidney cancer     s/p left nephrectomy 1/2016    Hyperparathyroidism, unspecified     Hyperuricemia without signs of inflammatory arthritis and tophaceous disease     Kidney stones     LGSIL (low grade squamous intraepithelial dysplasia)     Myocardiopathy 7/21/2017    Prediabetes     Proteinuria     Renal disorder     Thyroid nodule     Urate nephropathy      Past Surgical History:   Procedure Laterality Date    BREAST CYST EXCISION      COLONOSCOPY N/A 11/12/2015    COLONOSCOPY N/A 3/12/2021    Procedure: COLONOSCOPY;  Surgeon: Brendon Lanier MD;   Location: [facility];  Service: Endoscopy;  Laterality: N/A;  covid test 3/9, labs prior, prep instr mailed -ml    INSERTION OF BIVENTRICULAR IMPLANTABLE CARDIOVERTER-DEFIBRILLATOR (ICD)  04/2021    NEPHRECTOMY-LAPAROSCOPIC Left 01/12/2016    PERITONEAL CATHETER INSERTION      Permacath insertion  01/12/2017    SALPINGOOPHORECTOMY Right 2016    KJB---DAVINCI    TONSILLECTOMY      TUBAL LIGATION       Family History   Problem Relation Age of Onset    Hypertension Mother     Diabetes Father     Kidney disease Father     No Known Problems Son     No Known Problems Son     Diabetes Maternal Grandfather     No Known Problems Sister     No Known Problems Brother     No Known Problems Maternal Grandmother     No Known Problems Paternal Grandmother     No Known Problems Paternal Grandfather     Heart disease Sister     No Known Problems Sister     No Known Problems Brother     No Known Problems Maternal Aunt     No Known Problems Maternal Uncle     No Known Problems Paternal Aunt     No Known Problems Paternal Uncle     Breast cancer Neg Hx     Colon cancer Neg Hx     Cancer Neg Hx     Stroke Neg Hx     Amblyopia Neg Hx     Blindness Neg Hx     Cataracts Neg Hx     Glaucoma Neg Hx     Macular degeneration Neg Hx     Retinal detachment Neg Hx     Strabismus Neg Hx     Thyroid disease Neg Hx      Social History     Tobacco Use    Smoking status: Never Smoker    Smokeless tobacco: Never Used   Substance Use Topics    Alcohol use: No     Comment: . 2 children. works at [employer].    Drug use: No     Review of Systems   Constitutional: Negative for fever.   HENT: Positive for nosebleeds. Negative for sore throat.    Eyes: Negative for pain.   Respiratory: Negative for shortness of breath.    Cardiovascular: Negative for chest pain.   Gastrointestinal: Negative for abdominal pain and nausea.   Genitourinary: Negative for dysuria.   Musculoskeletal: Negative for back pain.   Skin:  Negative for rash.   Neurological: Negative for weakness.       Physical Exam     Initial Vitals [06/10/22 1408]   BP Pulse Resp Temp SpO2   (!) 145/69 70 17 97.6 °F (36.4 °C) 95 %      MAP       --         Physical Exam    Nursing note and vitals reviewed.  Constitutional: She is not diaphoretic. No distress.   HENT:   Head: Normocephalic and atraumatic.   Mouth/Throat: Oropharynx is clear and moist.   Irritation and multiple areas of bleeding in the right anterior nares. No posterior oropharyngeal bleeding.   Eyes: Conjunctivae and EOM are normal. No scleral icterus.   Neck: Neck supple. No tracheal deviation present.   Normal range of motion.  Cardiovascular: Normal rate, regular rhythm and intact distal pulses.   Pulmonary/Chest: Breath sounds normal. No stridor. No respiratory distress.   Abdominal: Abdomen is soft. She exhibits no distension. There is no abdominal tenderness.   Musculoskeletal:         General: No tenderness or edema. Normal range of motion.      Cervical back: Normal range of motion and neck supple.      Comments: AV fistula to the left upper extremity.     Neurological: She is alert. She has normal strength. No cranial nerve deficit or sensory deficit. GCS score is 15. GCS eye subscore is 4. GCS verbal subscore is 5. GCS motor subscore is 6.   Skin: Skin is warm and dry.   Psychiatric: She has a normal mood and affect.         ED Course   Procedures  Labs Reviewed - No data to display       Imaging Results    None          Medications   LIDOcaine HCl 2% urojet (10 mLs Mucous Membrane Given by Provider 6/10/22 5189)   LIDOcaine-EPINEPHrine 1%-1:100,000 injection 1 mL (1 mL Other Given by Provider 6/10/22 4056)   neomycin-bacitracnZn-polymyxnB packet 1 each (1 each Topical (Top) Given by Provider 6/10/22 0407)   silver nitrate applicators applicator 1 applicator (1 applicator Topical (Top) Given by Provider 6/10/22 7707)     Medical Decision Making:   History:   Old Medical Records: I decided  to obtain old medical records.  Differential Diagnosis:   Differential diagnosis includes, but is not limited to: anterior epistaxis, posterior epistaxis, coagulopathy.  ED Management:  Patient is afebrile and in no acute distress at time history and physical.  Vitals within acceptable ranges.  Labs from yesterday reviewed and within acceptable ranges.  Patient has right anterior epistaxis.  Treatment attempted with Afrin and nasal pressure with persistent symptoms.  Nose packed with Surgicel with persistent bleeding.  Nose packed with quick clot and on reassessment patient has significant improvement in bleeding.  There is some persistent bleeding from a small spot on the right nasal septum.  Nose packed with lidocaine with epinephrine and on reassessment patient has resolution of bleeding.  Silver nitrate used to cauterize area of nasal septum that persisted bleeding.  On reassessment there is resolution of epistaxis.  Patient is clinically stable and fit for discharge to follow up with ENT.  Prescribed nasal saline gel, nasal saline spray. counseled on supportive care, appropriate medication usage, concerning symptoms for which to return to ER and the importance of follow up. Understanding and agreement with treatment plan was expressed.   This chart was completed using dictation software, as a result there may be some transcription errors.           Scribe Attestation:   Scribe #1: I performed the above scribed service and the documentation accurately describes the services I performed. I attest to the accuracy of the note.                 Clinical Impression:   Final diagnoses:  [R04.0] Right-sided epistaxis (Primary)          ED Disposition Condition    Discharge Stable        ED Prescriptions     Medication Sig Dispense Start Date End Date Auth. Provider    sodium chloride (SALINE NASAL) 0.65 % nasal spray 1 spray by Nasal route as needed (Nasal dryness). 15 mL 6/10/2022  Reynold Ray MD    sodium  chloride-aloe vera (SALINE NASAL, ALOE VERA,) Gel 1 application by Nasal route 2 (two) times a day. 14.1 g 6/10/2022  Reynold Ray MD        Follow-up Information    None        I, Reynold Ray , personally performed the services described in this documentation. All medical record entries made by the scribe were at my direction and in my presence. I have reviewed the chart and agree that the record reflects my personal performance and is accurate and complete.     Reynold Ray MD  06/10/22 6487

## 2022-06-10 NOTE — ED TRIAGE NOTES
Pt arrived to the ED via personal transport due to epistaxis. Pt reports visiting ER x3 this week for same symptoms. Pt denies headache, vision changes, dizziness. Pt reports nose bleeds started on Sunday, subsided that night, then began again on Monday. Nose bleeds has been constant since. Pt has been using Afrin at home with no relief. Vitals stable. Oriented x4. No distress noted.

## 2022-06-16 RX ORDER — SUCROFERRIC OXYHYDROXIDE 500 MG/1
500 TABLET, CHEWABLE ORAL 3 TIMES DAILY
Qty: 270 TABLET | Refills: 0 | Status: SHIPPED | OUTPATIENT
Start: 2022-06-16 | End: 2022-08-01

## 2022-06-22 ENCOUNTER — TELEPHONE (OUTPATIENT)
Dept: FAMILY MEDICINE | Facility: CLINIC | Age: 57
End: 2022-06-22
Payer: MEDICARE

## 2022-06-22 ENCOUNTER — OFFICE VISIT (OUTPATIENT)
Dept: OTOLARYNGOLOGY | Facility: CLINIC | Age: 57
End: 2022-06-22
Payer: MEDICARE

## 2022-06-22 VITALS — HEIGHT: 64 IN | WEIGHT: 120.94 LBS | BODY MASS INDEX: 20.65 KG/M2

## 2022-06-22 DIAGNOSIS — M89.9 CHRONIC KIDNEY DISEASE-MINERAL AND BONE DISORDER: ICD-10-CM

## 2022-06-22 DIAGNOSIS — R04.0 EPISTAXIS: Primary | ICD-10-CM

## 2022-06-22 DIAGNOSIS — R04.0 RIGHT-SIDED EPISTAXIS: ICD-10-CM

## 2022-06-22 DIAGNOSIS — N18.9 CHRONIC KIDNEY DISEASE-MINERAL AND BONE DISORDER: ICD-10-CM

## 2022-06-22 DIAGNOSIS — N18.6 ESRD (END STAGE RENAL DISEASE) ON DIALYSIS: ICD-10-CM

## 2022-06-22 DIAGNOSIS — Z99.2 ESRD (END STAGE RENAL DISEASE) ON DIALYSIS: ICD-10-CM

## 2022-06-22 DIAGNOSIS — E83.9 CHRONIC KIDNEY DISEASE-MINERAL AND BONE DISORDER: ICD-10-CM

## 2022-06-22 DIAGNOSIS — D63.1 ANEMIA IN ESRD (END-STAGE RENAL DISEASE): Primary | ICD-10-CM

## 2022-06-22 DIAGNOSIS — D64.9 ANEMIA, UNSPECIFIED TYPE: ICD-10-CM

## 2022-06-22 DIAGNOSIS — R04.0 RECURRENT EPISTAXIS: ICD-10-CM

## 2022-06-22 DIAGNOSIS — N18.6 ANEMIA IN ESRD (END-STAGE RENAL DISEASE): Primary | ICD-10-CM

## 2022-06-22 PROCEDURE — 1159F PR MEDICATION LIST DOCUMENTED IN MEDICAL RECORD: ICD-10-PCS | Mod: CPTII,S$GLB,, | Performed by: STUDENT IN AN ORGANIZED HEALTH CARE EDUCATION/TRAINING PROGRAM

## 2022-06-22 PROCEDURE — 99213 PR OFFICE/OUTPT VISIT, EST, LEVL III, 20-29 MIN: ICD-10-PCS | Mod: 25,S$GLB,, | Performed by: STUDENT IN AN ORGANIZED HEALTH CARE EDUCATION/TRAINING PROGRAM

## 2022-06-22 PROCEDURE — 3008F BODY MASS INDEX DOCD: CPT | Mod: CPTII,S$GLB,, | Performed by: STUDENT IN AN ORGANIZED HEALTH CARE EDUCATION/TRAINING PROGRAM

## 2022-06-22 PROCEDURE — 30901 CONTROL OF NOSEBLEED: CPT | Mod: RT,S$GLB,, | Performed by: STUDENT IN AN ORGANIZED HEALTH CARE EDUCATION/TRAINING PROGRAM

## 2022-06-22 PROCEDURE — 3008F PR BODY MASS INDEX (BMI) DOCUMENTED: ICD-10-PCS | Mod: CPTII,S$GLB,, | Performed by: STUDENT IN AN ORGANIZED HEALTH CARE EDUCATION/TRAINING PROGRAM

## 2022-06-22 PROCEDURE — 99213 OFFICE O/P EST LOW 20 MIN: CPT | Mod: 25,S$GLB,, | Performed by: STUDENT IN AN ORGANIZED HEALTH CARE EDUCATION/TRAINING PROGRAM

## 2022-06-22 PROCEDURE — 1160F RVW MEDS BY RX/DR IN RCRD: CPT | Mod: CPTII,S$GLB,, | Performed by: STUDENT IN AN ORGANIZED HEALTH CARE EDUCATION/TRAINING PROGRAM

## 2022-06-22 PROCEDURE — 1160F PR REVIEW ALL MEDS BY PRESCRIBER/CLIN PHARMACIST DOCUMENTED: ICD-10-PCS | Mod: CPTII,S$GLB,, | Performed by: STUDENT IN AN ORGANIZED HEALTH CARE EDUCATION/TRAINING PROGRAM

## 2022-06-22 PROCEDURE — 1159F MED LIST DOCD IN RCRD: CPT | Mod: CPTII,S$GLB,, | Performed by: STUDENT IN AN ORGANIZED HEALTH CARE EDUCATION/TRAINING PROGRAM

## 2022-06-22 PROCEDURE — 30901 PR CTRL 2SEBLEED,ANTER,SIMPLE: ICD-10-PCS | Mod: RT,S$GLB,, | Performed by: STUDENT IN AN ORGANIZED HEALTH CARE EDUCATION/TRAINING PROGRAM

## 2022-06-22 NOTE — TELEPHONE ENCOUNTER
----- Message from Lavern Bhatti sent at 6/22/2022  3:15 PM CDT -----  Type: Patient Call Back    Who called: self    What is the request in detail: patient would like to have orders to for lab work to check her blood count. Please call    Can the clinic reply by [facility]? no    Would the patient rather a call back or a response via [facility]? call    Best call back number: [phone]

## 2022-06-22 NOTE — PROGRESS NOTES
Subjective:     Eva Bloom is a 56 y.o. female who was referred to me by Dr. Leeroy Nix in consultation for nosebleeds.     She has previously had nasal cauterization.  The bleeding has required packing for control.  The last episode was 12 days ago, and is not currently active.  There is not a prior history of nasal surgery.  There is not a prior history of nasal trauma.  There is not a history of environmental allergies.  She does currently use a nasal spray Afrin.  There is not a family history to suggest a clotting disorder.  She does not currently take a blood-thinning agent.    Hx of ESRD on dialysis. Patient states that nosebleeds have been more frequent since being on dialysis. Was seen in the ED 3 times in 1 week for right sided epistaxis.       SNOT-22 score: : (P) 18  NOSE score:: (P) 10%  ETDQ-7 score:: (P) 1.9    Past Medical History  She has a past medical history of Anemia, Bronchitis, Cancer, CHF (congestive heart failure), NYHA class II, chronic, systolic, CMV (cytomegalovirus) antibody positive, Essential hypertension, H/O herpes simplex type 2 infection, Herpes simplex type 1 antibody positive, History of kidney cancer, Hyperparathyroidism, unspecified, Hyperuricemia without signs of inflammatory arthritis and tophaceous disease, Kidney stones, LGSIL (low grade squamous intraepithelial dysplasia), Myocardiopathy, Prediabetes, Proteinuria, Renal disorder, Thyroid nodule, and Urate nephropathy.    Past Surgical History  She has a past surgical history that includes Tonsillectomy; Tubal ligation; Colonoscopy (N/A, 11/12/2015); Nephrectomy-Laparoscopic-Donor (Left, 01/12/2016); Peritoneal catheter insertion; Salpingoophorectomy (Right, 2016); Permacath insertion (01/12/2017); Breast cyst excision; Colonoscopy (N/A, 3/12/2021); and Insertion of biventricular implantable cardioverter-defibrillator (ICD) (04/2021).    Family History  Her family history includes Diabetes in her father and  maternal grandfather; Heart disease in her sister; Hypertension in her mother; Kidney disease in her father; No Known Problems in her brother, brother, maternal aunt, maternal grandmother, maternal uncle, paternal aunt, paternal grandfather, paternal grandmother, paternal uncle, sister, sister, son, and son.    Social History  She reports that she has never smoked. She has never used smokeless tobacco. She reports that she does not drink alcohol and does not use drugs.    Allergies  She is allergic to coreg [carvedilol] and allopurinol.    Medications  She has a current medication list which includes the following prescription(s): acetaminophen, aluminum-magnesium hydroxide-simethicone, amlodipine, cabozantinib, clonidine 0.3 mg/24 hr td ptwk, epoetin david-epbx, epoetin david-epbx, hydralazine, isosorbide mononitrate, lidocaine-prilocaine, lorazepam, metoprolol succinate, multivitamin-ca-iron-minerals, one-a-day women's 50 plus, olmesartan, sevelamer carbonate, velphoro, hydrocortisone, sodium chloride, and saline nasal (aloe vera).    Review of Systems     Constitutional: Negative for appetite change, chills, fatigue, fever and unexpected weight loss.      HENT: Positive for nosebleeds and sinus infection.      Eyes:  Positive for photophobia.     Respiratory:  Negative for cough, shortness of breath, sleep apnea, snoring and wheezing.      Cardiovascular:  Positive for foot swelling.     Gastrointestinal:  Negative for abdominal pain, acid reflux, constipation, diarrhea, heartburn and vomiting.     Genitourinary: Negative for difficulty urinating, sexual problems and frequent urination.     Musc: Positive for aching muscles.     Skin: Negative for rash.     Allergy: Negative for food allergies and seasonal allergies.     Endocrine: Positive for cold intolerance and heat intolerance.     Neurological: Negative for dizziness, headaches, light-headedness, seizures and tremors.      Hematologic: Positive for  "bruises/bleeds easily.     Psychiatric: Negative for decreased concentration, depression, nervous/anxious and sleep disturbance.               Objective:     Ht 5' 4" (1.626 m)   Wt 54.9 kg (120 lb 14.8 oz)   LMP 10/24/2016   BMI 20.76 kg/m²        Constitutional:   Vital signs are normal. She appears well-developed. Normal speech.      Head:  Normocephalic and atraumatic.     Ears:    Right Ear: No drainage or tenderness. No middle ear effusion.   Left Ear: No drainage or tenderness.  No middle ear effusion.     Nose:  Mucosal edema present. Epistaxis is observed.     Mouth/Throat  Oropharynx clear and moist without lesions or asymmetry and normal uvula midline. No trismus. No oropharyngeal exudate. Mirror exam not performed due to patient tolerance.  Mirror exam not performed due to patient tolerance.      Neck:  Neck normal without thyromegaly masses, asymmetry, normal tracheal structure, crepitus, and tenderness, thyroid normal and trachea normal.     Pulmonary/Chest:   Effort normal.     Psychiatric:   She has a normal mood and affect. Her speech is normal.     Skin:   No abrasions, lacerations, lesions, or rashes.       Procedure    Nasal cauterization performed.  See procedure note.    Nasal Endoscopy:  6/22/2022    The use of diagnostic nasal endoscopy was considered medically necessary for the evaluation and visualization of the nasal anatomy for symptoms suggestive of nasal or sinus origin. Physical examination (including a nasal speculum evaluation) did not provide sufficient clinical information to establish a diagnosis, or symptoms did not improve or worsened following treatment.     The nasal cavity was decongested with topical 1% phenylephrine and anesthetized with 4% lidocaine.  A rigid 0-degree endoscope was introduced into the nasal cavity.    The patient was seated in the examination chair. After discussion of risks and benefits, a nasal endoscope was inserted into the nose the endoscope was " passed along the left nasal floor to the nasopharynx. It was then passed between the middle and superior meatus, nasal turbinates, nasal septum, nasopharynx and sphenoethmoid region. The nasal endoscope was withdrawn and there was no complications. An identical procedure was performed on the right side. I was present for the entire procedure.The patient tolerated the above procedure well. The findings of this procedure can be found in the dictated note from 6/22/2022 visit.                        Nasal Endoscopy with Control of Epistaxis    After written consent was obtained the nose was anesthetized with topical pontocaine and phenylephrine pledgets. silver nitrate was used to cauterize the prominent vascularity on the right anterior septum.  The patient tolerated the procedure well and there were no complications.  Hemostasis was obtained.  Bacitracin ointment was placed after cauterization.      Data Reviewed    Hemoglobin (g/dL)   Date Value   06/09/2022 9.7 (L)     Hematocrit (%)   Date Value   06/09/2022 30.0 (L)     Platelets (K/uL)   Date Value   06/09/2022 161     Prothrombin Time (sec)   Date Value   06/09/2022 11.4     aPTT (sec)   Date Value   04/16/2021 26.0     INR (no units)   Date Value   06/09/2022 1.1        Assessment:     1. Epistaxis    2. Recurrent epistaxis    3. Chronic kidney disease-mineral and bone disorder    4. ESRD (end stage renal disease) on dialysis    5. Anemia, unspecified type         Plan:     I had a long discussion with the patient and her huband regarding her condition and the further workup and management options.      Do not blow nose for 1 week.  Sneeze with mouth open.  Do not take ibuprofen or aspirin for 1 week.  Place saline nasal gel in nostrils at bedtime.  May use saline nose drops during the day to prevent dryness.  If bleeding recurs, use oxymetazoline (Afrin) spray in the nostril and call for follow-up appointment.    Malik Burgos MD

## 2022-06-22 NOTE — TELEPHONE ENCOUNTER
Spoke to patient and she sts that her blood count was low. She had a bad nose bleed and she lost a lot of blood. Patient dialysis nurse tangela was asking for the patient to get her cbc check again. They would like to see if the numbers is different from the labs on 6/6.

## 2022-06-23 ENCOUNTER — TELEPHONE (OUTPATIENT)
Dept: FAMILY MEDICINE | Facility: CLINIC | Age: 57
End: 2022-06-23
Payer: MEDICARE

## 2022-06-23 ENCOUNTER — LAB VISIT (OUTPATIENT)
Dept: LAB | Facility: HOSPITAL | Age: 57
End: 2022-06-23
Attending: INTERNAL MEDICINE
Payer: MEDICARE

## 2022-06-23 DIAGNOSIS — D64.9 ANEMIA, UNSPECIFIED TYPE: Primary | ICD-10-CM

## 2022-06-23 DIAGNOSIS — D63.1 ANEMIA IN ESRD (END-STAGE RENAL DISEASE): ICD-10-CM

## 2022-06-23 DIAGNOSIS — N18.6 ANEMIA IN ESRD (END-STAGE RENAL DISEASE): ICD-10-CM

## 2022-06-23 LAB
BASOPHILS # BLD AUTO: 0.03 K/UL (ref 0–0.2)
BASOPHILS NFR BLD: 0.5 % (ref 0–1.9)
DIFFERENTIAL METHOD: ABNORMAL
EOSINOPHIL # BLD AUTO: 0.2 K/UL (ref 0–0.5)
EOSINOPHIL NFR BLD: 2.5 % (ref 0–8)
ERYTHROCYTE [DISTWIDTH] IN BLOOD BY AUTOMATED COUNT: 18.7 % (ref 11.5–14.5)
HCT VFR BLD AUTO: 25.5 % (ref 37–48.5)
HGB BLD-MCNC: 8 G/DL (ref 12–16)
IMM GRANULOCYTES # BLD AUTO: 0.02 K/UL (ref 0–0.04)
IMM GRANULOCYTES NFR BLD AUTO: 0.3 % (ref 0–0.5)
LYMPHOCYTES # BLD AUTO: 1.3 K/UL (ref 1–4.8)
LYMPHOCYTES NFR BLD: 21.9 % (ref 18–48)
MCH RBC QN AUTO: 34.5 PG (ref 27–31)
MCHC RBC AUTO-ENTMCNC: 31.4 G/DL (ref 32–36)
MCV RBC AUTO: 110 FL (ref 82–98)
MONOCYTES # BLD AUTO: 0.7 K/UL (ref 0.3–1)
MONOCYTES NFR BLD: 11.2 % (ref 4–15)
NEUTROPHILS # BLD AUTO: 3.7 K/UL (ref 1.8–7.7)
NEUTROPHILS NFR BLD: 63.6 % (ref 38–73)
NRBC BLD-RTO: 0 /100 WBC
PLATELET # BLD AUTO: 195 K/UL (ref 150–450)
PMV BLD AUTO: 10.2 FL (ref 9.2–12.9)
RBC # BLD AUTO: 2.32 M/UL (ref 4–5.4)
WBC # BLD AUTO: 5.89 K/UL (ref 3.9–12.7)

## 2022-06-23 PROCEDURE — 85025 COMPLETE CBC W/AUTO DIFF WBC: CPT | Performed by: INTERNAL MEDICINE

## 2022-06-23 PROCEDURE — 36415 COLL VENOUS BLD VENIPUNCTURE: CPT | Mod: PO | Performed by: INTERNAL MEDICINE

## 2022-06-23 NOTE — TELEPHONE ENCOUNTER
Spoke with patient about CBC result. It is lower than previous but not critical. She is feeling overall ok and not symptomatic (over baseline).  Advised that I would alert her nephrologist and place a consult order to hematology as well.  She will seek immediate care for any changes in her medical condition.

## 2022-06-28 ENCOUNTER — HOSPITAL ENCOUNTER (INPATIENT)
Facility: HOSPITAL | Age: 57
LOS: 1 days | Discharge: HOME | DRG: 308 | End: 2022-06-29
Attending: EMERGENCY MEDICINE | Admitting: STUDENT IN AN ORGANIZED HEALTH CARE EDUCATION/TRAINING PROGRAM
Payer: MEDICARE

## 2022-06-28 ENCOUNTER — TELEPHONE (OUTPATIENT)
Dept: FAMILY MEDICINE | Facility: CLINIC | Age: 57
End: 2022-06-28
Payer: MEDICARE

## 2022-06-28 DIAGNOSIS — M79.601 RIGHT ARM PAIN: ICD-10-CM

## 2022-06-28 DIAGNOSIS — I48.92 ATRIAL FLUTTER WITH RAPID VENTRICULAR RESPONSE: ICD-10-CM

## 2022-06-28 DIAGNOSIS — R07.9 CHEST PAIN: ICD-10-CM

## 2022-06-28 DIAGNOSIS — I48.91 ATRIAL FIBRILLATION WITH RVR: ICD-10-CM

## 2022-06-28 LAB
ABO + RH BLD: NORMAL
ALBUMIN SERPL BCP-MCNC: 3.3 G/DL (ref 3.5–5.2)
ALP SERPL-CCNC: 306 U/L (ref 55–135)
ALT SERPL W/O P-5'-P-CCNC: 35 U/L (ref 10–44)
ANION GAP SERPL CALC-SCNC: 20 MMOL/L (ref 8–16)
AST SERPL-CCNC: 65 U/L (ref 10–40)
BASOPHILS # BLD AUTO: 0.08 K/UL (ref 0–0.2)
BASOPHILS NFR BLD: 1.3 % (ref 0–1.9)
BILIRUB SERPL-MCNC: 1.4 MG/DL (ref 0.1–1)
BLD GP AB SCN CELLS X3 SERPL QL: NORMAL
BLD PROD TYP BPU: NORMAL
BLOOD UNIT EXPIRATION DATE: NORMAL
BLOOD UNIT TYPE CODE: 6200
BLOOD UNIT TYPE: NORMAL
BNP SERPL-MCNC: >4900 PG/ML (ref 0–99)
BUN SERPL-MCNC: 28 MG/DL (ref 6–20)
CALCIUM SERPL-MCNC: 8.8 MG/DL (ref 8.7–10.5)
CHLORIDE SERPL-SCNC: 98 MMOL/L (ref 95–110)
CO2 SERPL-SCNC: 23 MMOL/L (ref 23–29)
CODING SYSTEM: NORMAL
CREAT SERPL-MCNC: 4.1 MG/DL (ref 0.5–1.4)
CTP QC/QA: YES
DIFFERENTIAL METHOD: ABNORMAL
DISPENSE STATUS: NORMAL
EOSINOPHIL # BLD AUTO: 0.1 K/UL (ref 0–0.5)
EOSINOPHIL NFR BLD: 1.3 % (ref 0–8)
ERYTHROCYTE [DISTWIDTH] IN BLOOD BY AUTOMATED COUNT: 19.1 % (ref 11.5–14.5)
EST. GFR  (AFRICAN AMERICAN): 13 ML/MIN/1.73 M^2
EST. GFR  (NON AFRICAN AMERICAN): 11 ML/MIN/1.73 M^2
GLUCOSE SERPL-MCNC: 83 MG/DL (ref 70–110)
HCT VFR BLD AUTO: 25 % (ref 37–48.5)
HGB BLD-MCNC: 7.7 G/DL (ref 12–16)
IMM GRANULOCYTES # BLD AUTO: 0.03 K/UL (ref 0–0.04)
IMM GRANULOCYTES NFR BLD AUTO: 0.5 % (ref 0–0.5)
LYMPHOCYTES # BLD AUTO: 1.1 K/UL (ref 1–4.8)
LYMPHOCYTES NFR BLD: 18.7 % (ref 18–48)
MCH RBC QN AUTO: 33.6 PG (ref 27–31)
MCHC RBC AUTO-ENTMCNC: 30.8 G/DL (ref 32–36)
MCV RBC AUTO: 109 FL (ref 82–98)
MONOCYTES # BLD AUTO: 0.5 K/UL (ref 0.3–1)
MONOCYTES NFR BLD: 8.3 % (ref 4–15)
NEUTROPHILS # BLD AUTO: 4.2 K/UL (ref 1.8–7.7)
NEUTROPHILS NFR BLD: 69.9 % (ref 38–73)
NRBC BLD-RTO: 1 /100 WBC
PLATELET # BLD AUTO: 204 K/UL (ref 150–450)
PMV BLD AUTO: 9.9 FL (ref 9.2–12.9)
POTASSIUM SERPL-SCNC: 3.9 MMOL/L (ref 3.5–5.1)
PROT SERPL-MCNC: 7.6 G/DL (ref 6–8.4)
RBC # BLD AUTO: 2.29 M/UL (ref 4–5.4)
SARS-COV-2 RDRP RESP QL NAA+PROBE: NEGATIVE
SODIUM SERPL-SCNC: 141 MMOL/L (ref 136–145)
TRANS ERYTHROCYTES VOL PATIENT: NORMAL ML
TROPONIN I SERPL DL<=0.01 NG/ML-MCNC: 0.48 NG/ML (ref 0–0.03)
WBC # BLD AUTO: 5.99 K/UL (ref 3.9–12.7)

## 2022-06-28 PROCEDURE — 96376 TX/PRO/DX INJ SAME DRUG ADON: CPT

## 2022-06-28 PROCEDURE — 99285 EMERGENCY DEPT VISIT HI MDM: CPT | Mod: 25

## 2022-06-28 PROCEDURE — 36430 TRANSFUSION BLD/BLD COMPNT: CPT

## 2022-06-28 PROCEDURE — P9021 RED BLOOD CELLS UNIT: HCPCS | Performed by: STUDENT IN AN ORGANIZED HEALTH CARE EDUCATION/TRAINING PROGRAM

## 2022-06-28 PROCEDURE — 86850 RBC ANTIBODY SCREEN: CPT | Performed by: EMERGENCY MEDICINE

## 2022-06-28 PROCEDURE — 93005 ELECTROCARDIOGRAM TRACING: CPT

## 2022-06-28 PROCEDURE — 93010 ELECTROCARDIOGRAM REPORT: CPT | Mod: ,,, | Performed by: INTERNAL MEDICINE

## 2022-06-28 PROCEDURE — 80053 COMPREHEN METABOLIC PANEL: CPT | Performed by: EMERGENCY MEDICINE

## 2022-06-28 PROCEDURE — 96374 THER/PROPH/DIAG INJ IV PUSH: CPT

## 2022-06-28 PROCEDURE — 11000001 HC ACUTE MED/SURG PRIVATE ROOM

## 2022-06-28 PROCEDURE — 25000003 PHARM REV CODE 250: Performed by: STUDENT IN AN ORGANIZED HEALTH CARE EDUCATION/TRAINING PROGRAM

## 2022-06-28 PROCEDURE — 86920 COMPATIBILITY TEST SPIN: CPT | Performed by: STUDENT IN AN ORGANIZED HEALTH CARE EDUCATION/TRAINING PROGRAM

## 2022-06-28 PROCEDURE — U0002 COVID-19 LAB TEST NON-CDC: HCPCS | Performed by: EMERGENCY MEDICINE

## 2022-06-28 PROCEDURE — 93010 ELECTROCARDIOGRAM REPORT: CPT | Mod: 76,,, | Performed by: INTERNAL MEDICINE

## 2022-06-28 PROCEDURE — 85025 COMPLETE CBC W/AUTO DIFF WBC: CPT | Performed by: EMERGENCY MEDICINE

## 2022-06-28 PROCEDURE — 83880 ASSAY OF NATRIURETIC PEPTIDE: CPT | Performed by: EMERGENCY MEDICINE

## 2022-06-28 PROCEDURE — 27201040 HC RC 50 FILTER: Performed by: EMERGENCY MEDICINE

## 2022-06-28 PROCEDURE — 25000003 PHARM REV CODE 250: Performed by: EMERGENCY MEDICINE

## 2022-06-28 PROCEDURE — 84484 ASSAY OF TROPONIN QUANT: CPT | Performed by: EMERGENCY MEDICINE

## 2022-06-28 PROCEDURE — 93010 EKG 12-LEAD: ICD-10-PCS | Mod: 76,,, | Performed by: INTERNAL MEDICINE

## 2022-06-28 RX ORDER — SODIUM CHLORIDE 0.9 % (FLUSH) 0.9 %
10 SYRINGE (ML) INJECTION EVERY 12 HOURS PRN
Status: DISCONTINUED | OUTPATIENT
Start: 2022-06-28 | End: 2022-06-29 | Stop reason: HOSPADM

## 2022-06-28 RX ORDER — SEVELAMER CARBONATE 800 MG/1
1600 TABLET, FILM COATED ORAL
Status: DISCONTINUED | OUTPATIENT
Start: 2022-06-28 | End: 2022-06-29 | Stop reason: HOSPADM

## 2022-06-28 RX ORDER — ACETAMINOPHEN 325 MG/1
650 TABLET ORAL EVERY 4 HOURS PRN
Status: DISCONTINUED | OUTPATIENT
Start: 2022-06-28 | End: 2022-06-29 | Stop reason: HOSPADM

## 2022-06-28 RX ORDER — HYDROCODONE BITARTRATE AND ACETAMINOPHEN 500; 5 MG/1; MG/1
TABLET ORAL
Status: DISCONTINUED | OUTPATIENT
Start: 2022-06-28 | End: 2022-06-29 | Stop reason: HOSPADM

## 2022-06-28 RX ORDER — METOPROLOL TARTRATE 1 MG/ML
5 INJECTION, SOLUTION INTRAVENOUS
Status: COMPLETED | OUTPATIENT
Start: 2022-06-28 | End: 2022-06-28

## 2022-06-28 RX ORDER — IBUPROFEN 200 MG
16 TABLET ORAL
Status: DISCONTINUED | OUTPATIENT
Start: 2022-06-28 | End: 2022-06-29 | Stop reason: HOSPADM

## 2022-06-28 RX ORDER — METOPROLOL SUCCINATE 50 MG/1
100 TABLET, EXTENDED RELEASE ORAL DAILY
Status: DISCONTINUED | OUTPATIENT
Start: 2022-06-29 | End: 2022-06-29 | Stop reason: HOSPADM

## 2022-06-28 RX ORDER — IBUPROFEN 200 MG
24 TABLET ORAL
Status: DISCONTINUED | OUTPATIENT
Start: 2022-06-28 | End: 2022-06-29 | Stop reason: HOSPADM

## 2022-06-28 RX ORDER — METOPROLOL SUCCINATE 50 MG/1
100 TABLET, EXTENDED RELEASE ORAL ONCE
Status: COMPLETED | OUTPATIENT
Start: 2022-06-28 | End: 2022-06-28

## 2022-06-28 RX ORDER — GLUCAGON 1 MG
1 KIT INJECTION
Status: DISCONTINUED | OUTPATIENT
Start: 2022-06-28 | End: 2022-06-29 | Stop reason: HOSPADM

## 2022-06-28 RX ORDER — NALOXONE HCL 0.4 MG/ML
0.02 VIAL (ML) INJECTION
Status: DISCONTINUED | OUTPATIENT
Start: 2022-06-28 | End: 2022-06-29 | Stop reason: HOSPADM

## 2022-06-28 RX ADMIN — METOPROLOL SUCCINATE 100 MG: 50 TABLET, EXTENDED RELEASE ORAL at 12:06

## 2022-06-28 RX ADMIN — APIXABAN 5 MG: 5 TABLET, FILM COATED ORAL at 02:06

## 2022-06-28 RX ADMIN — APIXABAN 5 MG: 5 TABLET, FILM COATED ORAL at 08:06

## 2022-06-28 RX ADMIN — METOROPROLOL TARTRATE 5 MG: 5 INJECTION, SOLUTION INTRAVENOUS at 12:06

## 2022-06-28 RX ADMIN — METOROPROLOL TARTRATE 5 MG: 5 INJECTION, SOLUTION INTRAVENOUS at 11:06

## 2022-06-28 RX ADMIN — METOROPROLOL TARTRATE 5 MG: 5 INJECTION, SOLUTION INTRAVENOUS at 10:06

## 2022-06-28 NOTE — ED TRIAGE NOTES
Pt. Complains of weakness that started this weekend. Pt. Is concerned because she had a recent nose bleed and she thinks that she lost a lot of blood. Pt. Goes to dialysis on Tuesday, Thursday, and Saturday. Pt. States that she did got to dialysis this morning, but she did not complete her full time. Pt. Complains of chest pain and describes it as a pressure pain located on center/left side of her chest that is non radiating and all the time. Pt. Rates her pain at an 8/10 on the pain scale.

## 2022-06-28 NOTE — HPI
Mrs. Bloom is a 57 yo F with extensive PMHx influding HFrEF with AICD, ESRD on HD, renal cancer, and HTN who presents to the ED for evaluation of weakness and palpitations. Her  explains the patient has been having ongoing nosebleeds and has lot a lot of blood from this over the last few weeks. Recently seen by ENT last week and has had no further bleeding. Since this past weekend, patient has been more weak and fatigued. This morning, she went to HD and had palpitations and felt like her heart was racing. She was told her heart rate was fast at that time. She came to the ED afterwards to be evaluated.  In the ED, she was found to be in atrial fibrillation with RVR. This is a new diagnosis for her. She was given IV lopressor and her home oral metoprolol. She eventually converted to normal sinus rhythm. Atrial fibrillation is a new diagnosis for her. She was admitted to hospital medicine for further management.

## 2022-06-28 NOTE — PHARMACY MED REC
"Admission Medication History     The home medication history was taken by Mariia Melendez CPhT.    You may go to "Admission" then "Reconcile Home Medications" tabs to review and/or act upon these items.      The home medication list has been updated by the Pharmacy department.    Please read ALL comments highlighted in yellow.    Please address this information as you see fit.     Feel free to contact us if you have any questions or require assistance.      The medications listed below were removed from the home medication list. Please reorder if appropriate:  Patient reports no longer taking the following medication(s):    (Retacrit inj (duplicate)   Hydrocortisone cream 2.5 %   Ativan 0.5 mg tablet   MV   Saline nasal spray (duplicate)    Medications listed below were obtained from: Patient/family and Analytic software- [employer]  (Not in a hospital admission)              Mariia Melendez CPhT.  [phone]                    .          "

## 2022-06-28 NOTE — H&P
[facility] Medicine  History & Physical    Patient Name: Eva Bloom  MRN: [id number]  Patient Class: IP- Inpatient  Admission Date: 6/28/2022  Attending Physician: Leonardo Robledo MD   Primary Care Provider: Sowmya Mccain MD         Patient information was obtained from patient, spouse/SO, past medical records and ER records.     Subjective:     Principal Problem:Atrial fibrillation with RVR    Chief Complaint:   Chief Complaint   Patient presents with    Fatigue    Palpitations     Pt reports nausea, fatigue, and heart palpitations x 1 week. Pt states her symptoms progressed after dialysis this morning. Pt denies sob, chest pain, vomiting, or diarrhea. Pt goes to dialysis Tuesday,Thursday,Saturday.        HPI: Mrs. Bloom is a 55 yo F with extensive PMHx influding HFrEF with AICD, ESRD on HD, renal cancer, and HTN who presents to the ED for evaluation of weakness and palpitations. Her  explains the patient has been having ongoing nosebleeds and has lot a lot of blood from this over the last few weeks. Recently seen by ENT last week and has had no further bleeding. Since this past weekend, patient has been more weak and fatigued. This morning, she went to HD and had palpitations and felt like her heart was racing. She was told her heart rate was fast at that time. She came to the ED afterwards to be evaluated.  In the ED, she was found to be in atrial fibrillation with RVR. This is a new diagnosis for her. She was given IV lopressor and her home oral metoprolol. She eventually converted to normal sinus rhythm. Atrial fibrillation is a new diagnosis for her. She was admitted to hospital medicine for further management.      Past Medical History:   Diagnosis Date    Anemia     Bronchitis 03/01/2017    Cancer 2016    kidney cancer    CHF (congestive heart failure), NYHA class II, chronic, systolic     CMV (cytomegalovirus) antibody positive     Essential hypertension 9/23/2015     H/O herpes simplex type 2 infection     Herpes simplex type 1 antibody positive     History of kidney cancer     s/p left nephrectomy 1/2016    Hyperparathyroidism, unspecified     Hyperuricemia without signs of inflammatory arthritis and tophaceous disease     Kidney stones     LGSIL (low grade squamous intraepithelial dysplasia)     Myocardiopathy 7/21/2017    Prediabetes     Proteinuria     Renal disorder     Thyroid nodule     Urate nephropathy        Past Surgical History:   Procedure Laterality Date    BREAST CYST EXCISION      COLONOSCOPY N/A 11/12/2015    COLONOSCOPY N/A 3/12/2021    Procedure: COLONOSCOPY;  Surgeon: Brendon Lanier MD;  Location: [facility];  Service: Endoscopy;  Laterality: N/A;  covid test 3/9, labs prior, prep instr mailed -ml    INSERTION OF BIVENTRICULAR IMPLANTABLE CARDIOVERTER-DEFIBRILLATOR (ICD)  04/2021    NEPHRECTOMY-LAPAROSCOPIC Left 01/12/2016    PERITONEAL CATHETER INSERTION      Permacath insertion  01/12/2017    SALPINGOOPHORECTOMY Right 2016    KJB---DAVINCI    TONSILLECTOMY      TUBAL LIGATION         Review of patient's allergies indicates:   Allergen Reactions    Coreg [carvedilol] Other (See Comments)     Nausea/vomiting    Allopurinol      Other reaction(s): abnormal transaminases       No current facility-administered medications on file prior to encounter.     Current Outpatient Medications on File Prior to Encounter   Medication Sig    acetaminophen (TYLENOL) 500 MG tablet Take 500 mg by mouth every 6 (six) hours as needed for Pain.    aluminum-magnesium hydroxide-simethicone (MAALOX) 200-200-20 mg/5 mL Susp Take 30 mLs by mouth every 6 (six) hours as needed.     cabozantinib (CABOMETYX) 60 mg Tab Take 60 mg by mouth once daily.    cloNIDine 0.3 mg/24 hr td ptwk (CATAPRES) 0.3 mg/24 hr Place 1 patch onto the skin every 7 days.    epoetin david-epbx (RETACRIT INJ) Epoetin david - epbx (Retacrit)    isosorbide mononitrate (IMDUR) 30  MG 24 hr tablet Take 1 tablet (30 mg total) by mouth once daily.    lidocaine-prilocaine (EMLA) cream APPLY ATLEAST 30 MINUTES BEFORE TREATMENT 3 TIMES A WEEK    mv,Ca,min-folic acid-vit K1 (ONE-A-DAY WOMEN'S 50 PLUS) 400-20 mcg Tab Take 1 tablet by mouth.    olmesartan (BENICAR) 40 MG tablet Take 1 tablet (40 mg total) by mouth once daily.    sevelamer carbonate (RENVELA) 800 mg Tab Take 2 tablets (1,600 mg total) by mouth 3 (three) times daily with meals.    [DISCONTINUED] hydrALAZINE (APRESOLINE) 100 MG tablet TAKE 1 TABLET BY MOUTH EVERY 12 HOURS    amLODIPine (NORVASC) 5 MG tablet TAKE 1 TABLET BY MOUTH EVERY DAY    metoprolol succinate (TOPROL-XL) 100 MG 24 hr tablet Take 1 tablet (100 mg total) by mouth once daily.    sodium chloride (SALINE NASAL) 0.65 % nasal spray 1 spray by Nasal route as needed (Nasal dryness). (Patient not taking: Reported on 6/22/2022)    sodium chloride-aloe vera (SALINE NASAL, ALOE VERA,) Gel 1 application by Nasal route 2 (two) times a day. (Patient not taking: Reported on 6/22/2022)    sucroferric oxyhydroxide (VELPHORO) 500 mg Chew Take 1 tablet (500 mg total) by mouth 3 (three) times daily.    [DISCONTINUED] epoetin david-epbx (RETACRIT INJ) Epoetin david - epbx (Retacrit)    [DISCONTINUED] hydrocortisone 2.5 % cream Apply topically 2 (two) times daily. for 14 days    [DISCONTINUED] LORazepam (ATIVAN) 0.5 MG tablet Take 1 tablet (0.5 mg total) by mouth every 6 (six) hours as needed for Anxiety.    [DISCONTINUED] multivitamin-Ca-iron-minerals 27-0.4 mg Tab Take by mouth. 1 Tablet Oral Every day     Family History       Problem Relation (Age of Onset)    Diabetes Father, Maternal Grandfather    Heart disease Sister    Hypertension Mother    Kidney disease Father    No Known Problems Son, Son, Sister, Brother, Maternal Grandmother, Paternal Grandmother, Paternal Grandfather, Sister, Brother, Maternal Aunt, Maternal Uncle, Paternal Aunt, Paternal Uncle          Tobacco  Use    Smoking status: Never Smoker    Smokeless tobacco: Never Used   Substance and Sexual Activity    Alcohol use: No     Comment: . 2 children. works at [employer].    Drug use: No    Sexual activity: Yes     Partners: Male     Review of Systems   Constitutional:  Positive for fatigue. Negative for chills and fever.   HENT:  Positive for nosebleeds. Negative for congestion, rhinorrhea and sinus pain.    Eyes:  Negative for visual disturbance.   Respiratory:  Negative for cough and shortness of breath.    Cardiovascular:  Positive for palpitations. Negative for chest pain and leg swelling.   Gastrointestinal:  Negative for abdominal pain, diarrhea and nausea.   Endocrine: Negative for polyuria.   Genitourinary:  Negative for dysuria and hematuria.   Musculoskeletal:  Negative for arthralgias and back pain.   Skin:  Negative for rash.   Neurological:  Negative for dizziness and numbness.   Psychiatric/Behavioral:  Negative for agitation and confusion. The patient is not hyperactive.    Objective:     Vital Signs (Most Recent):  Temp: 98.7 °F (37.1 °C) (06/28/22 0936)  Pulse: 79 (06/28/22 1301)  Resp: (!) 27 (06/28/22 1301)  BP: 128/73 (06/28/22 1301)  SpO2: 99 % (06/28/22 1301)   Vital Signs (24h Range):  Temp:  [98.7 °F (37.1 °C)] 98.7 °F (37.1 °C)  Pulse:  [] 79  Resp:  [16-31] 27  SpO2:  [94 %-100 %] 99 %  BP: (110-129)/(73-90) 128/73     Weight: 55.8 kg (123 lb)  Body mass index is 21.11 kg/m².    Physical Exam  Vitals and nursing note reviewed.   Constitutional:       General: She is not in acute distress.     Appearance: Normal appearance. She is ill-appearing.   HENT:      Head: Normocephalic.   Eyes:      General: No scleral icterus.     Conjunctiva/sclera: Conjunctivae normal.   Cardiovascular:      Rate and Rhythm: Normal rate and regular rhythm.      Pulses: Normal pulses.      Comments: In NSR on my exam  Fistula to left upper arm  Pulmonary:      Effort: Pulmonary effort is normal.       Breath sounds: Normal breath sounds.   Abdominal:      General: Bowel sounds are normal. There is no distension.      Tenderness: There is no abdominal tenderness.   Musculoskeletal:         General: No swelling. Normal range of motion.   Skin:     General: Skin is warm and dry.      Coloration: Skin is pale.   Neurological:      General: No focal deficit present.      Mental Status: She is alert and oriented to person, place, and time.   Psychiatric:         Mood and Affect: Mood normal.         Thought Content: Thought content normal.         Judgment: Judgment normal.           Significant Labs: All pertinent labs within the past 24 hours have been reviewed.    Significant Imaging: I have reviewed all pertinent imaging results/findings within the past 24 hours.    Assessment/Plan:     * Atrial fibrillation with RVR  This is a new diagnosis. ECG showed atrial fibrillation with RVR on admission. Converted to sinus with metoprolol in ED.  Discussed with patient and her  about new diagnosis and treatment with eliquis. FATUMAVASC 2.   - has hx of HFrEF with AICD  - recheck echo (last one noted almost 1 year ago) to see if any changes  - suspect dehydration/blood loss exacerbated  - continue home metoprolol      Anemia in ESRD (end-stage renal disease)  - Hb has decreased over the last few weeks due to acute blood loss via multiple nose bleeds  - Hb now 7.6 and patient is symptomatic with weakness  - BP is much lower than usual as well  - transfuse 1 unit pRBC and monitor      ESRD (end stage renal disease) on dialysis  - had HD this morning  - Nephrology consulted      Cardiomyopathy, nonischemic  - noted      Essential hypertension  - she is normotensive  - hold all blood pressure medications for now except metoprolol        VTE Risk Mitigation (From admission, onward)         Ordered     apixaban tablet 5 mg  2 times daily         06/28/22 1246     IP VTE HIGH RISK PATIENT  Once         06/28/22 1246     Place  sequential compression device  Until discontinued         06/28/22 1246                   Leonardo Robledo MD  Department of Hospital Medicine   [facility]

## 2022-06-28 NOTE — ED PROVIDER NOTES
Encounter Date: 6/28/2022    SCRIBE #1 NOTE: I, Lexus Carolina, am scribing for, and in the presence of,  Anam Bullock MD. I have scribed the following portions of the note - Other sections scribed: HPI, ROS.       History     Chief Complaint   Patient presents with    Fatigue    Palpitations     Pt reports nausea, fatigue, and heart palpitations x 1 week. Pt states her symptoms progressed after dialysis this morning. Pt denies sob, chest pain, vomiting, or diarrhea. Pt goes to dialysis Tuesday,Thursday,Saturday.     56 year old female, with pertinent medical history of HTN, CHF, ESRD on dialysis, and myocardiopathy, presents to the ED to evaluate her fatigue and shortness of breath upon exertion that began this morning. Patient follows a Tuesday, Thursday, Saturday schedule for her dialysis. She had labs performed this morning for dialysis and her session was stopped senior living because of low blood levels and complaints of chest pain and shortness of breath. Patient has been seen multiple times for abnormal nose bleeds but it is controlled now. She does not take any blood thinners. Patient has an abnormal heart rate/palpitations currently. She has a defibrillator in place but patient states she is unaware of why she has one. Patient denies any new bleeding, dark stool, or other associated symptoms.    The history is provided by the patient. No  was used.     Review of patient's allergies indicates:   Allergen Reactions    Coreg [carvedilol] Other (See Comments)     Nausea/vomiting    Allopurinol      Other reaction(s): abnormal transaminases     Past Medical History:   Diagnosis Date    Anemia     Bronchitis 03/01/2017    Cancer 2016    kidney cancer    CHF (congestive heart failure), NYHA class II, chronic, systolic     CMV (cytomegalovirus) antibody positive     Essential hypertension 9/23/2015    H/O herpes simplex type 2 infection     Herpes simplex type 1 antibody positive      History of kidney cancer     s/p left nephrectomy 1/2016    Hyperparathyroidism, unspecified     Hyperuricemia without signs of inflammatory arthritis and tophaceous disease     Kidney stones     LGSIL (low grade squamous intraepithelial dysplasia)     Myocardiopathy 7/21/2017    Prediabetes     Proteinuria     Renal disorder     Thyroid nodule     Urate nephropathy      Past Surgical History:   Procedure Laterality Date    BREAST CYST EXCISION      COLONOSCOPY N/A 11/12/2015    COLONOSCOPY N/A 3/12/2021    Procedure: COLONOSCOPY;  Surgeon: Brendon Lanier MD;  Location[facility];  Service: Endoscopy;  Laterality: N/A;  covid test 3/9, labs prior, prep instr mailed -ml    INSERTION OF BIVENTRICULAR IMPLANTABLE CARDIOVERTER-DEFIBRILLATOR (ICD)  04/2021    NEPHRECTOMY-LAPAROSCOPIC Left 01/12/2016    PERITONEAL CATHETER INSERTION      Permacath insertion  01/12/2017    SALPINGOOPHORECTOMY Right 2016    KJB---DAVINCI    TONSILLECTOMY      TUBAL LIGATION       Family History   Problem Relation Age of Onset    Hypertension Mother     Diabetes Father     Kidney disease Father     No Known Problems Son     No Known Problems Son     Diabetes Maternal Grandfather     No Known Problems Sister     No Known Problems Brother     No Known Problems Maternal Grandmother     No Known Problems Paternal Grandmother     No Known Problems Paternal Grandfather     Heart disease Sister     No Known Problems Sister     No Known Problems Brother     No Known Problems Maternal Aunt     No Known Problems Maternal Uncle     No Known Problems Paternal Aunt     No Known Problems Paternal Uncle     Breast cancer Neg Hx     Colon cancer Neg Hx     Cancer Neg Hx     Stroke Neg Hx     Amblyopia Neg Hx     Blindness Neg Hx     Cataracts Neg Hx     Glaucoma Neg Hx     Macular degeneration Neg Hx     Retinal detachment Neg Hx     Strabismus Neg Hx     Thyroid disease Neg Hx      Social History      Tobacco Use    Smoking status: Never Smoker    Smokeless tobacco: Never Used   Substance Use Topics    Alcohol use: No     Comment: . 2 children. works at [employer].    Drug use: No     Review of Systems   Constitutional: Positive for fatigue.   HENT: Negative.    Eyes: Negative.    Respiratory: Positive for shortness of breath.    Cardiovascular: Positive for chest pain and palpitations.   Gastrointestinal: Negative.    Genitourinary: Negative.    Musculoskeletal: Negative.    Skin: Negative.    Neurological: Negative.        Physical Exam     Initial Vitals [06/28/22 0936]   BP Pulse Resp Temp SpO2   124/86 (!) 140 16 98.7 °F (37.1 °C) (!) 94 %      MAP       --         Physical Exam    Nursing note and vitals reviewed.  Constitutional: She is not diaphoretic. She appears distressed (Mildly distressed, ill-appearing female).   HENT:   Head: Normocephalic and atraumatic.   Nose: Nose normal.   Eyes: EOM are normal. Pupils are equal, round, and reactive to light.   Neck: Neck supple. No JVD present.   Normal range of motion.  Cardiovascular: Normal heart sounds and intact distal pulses.   Tachycardic, irregularly irregular rate and rhythm.   Pulmonary/Chest: Breath sounds normal. No stridor. No respiratory distress. She has no wheezes. She has no rales.   Abdominal: Abdomen is soft. Bowel sounds are normal. She exhibits no distension. There is no abdominal tenderness.   Musculoskeletal:         General: No tenderness or edema. Normal range of motion.      Cervical back: Normal range of motion and neck supple.      Comments: AV fistula present in left upper extremity, palpable thrill.     Neurological: She is alert and oriented to person, place, and time. She has normal strength.   Skin: Skin is warm and dry. Capillary refill takes less than 2 seconds. No rash noted. No erythema. There is pallor.         ED Course   Critical Care    Date/Time: 6/28/2022 10:10 AM  Performed by: Anam Bullock  MD  Authorized by: Anam Bullock MD   Direct patient critical care time: 15 minutes  Additional history critical care time: 5 minutes  Ordering / reviewing critical care time: 5 minutes  Documentation critical care time: 5 minutes  Consulting other physicians critical care time: 5 minutes  Total critical care time (exclusive of procedural time) : 35 minutes  Critical care time was exclusive of separately billable procedures and treating other patients and teaching time.  Critical care was necessary to treat or prevent imminent or life-threatening deterioration of the following conditions: cardiac failure, circulatory failure and shock.  Critical care was time spent personally by me on the following activities: development of treatment plan with patient or surrogate, discussions with consultants, evaluation of patient's response to treatment, examination of patient, obtaining history from patient or surrogate, ordering and performing treatments and interventions, ordering and review of laboratory studies, ordering and review of radiographic studies, re-evaluation of patient's condition and review of old charts.        Labs Reviewed   COMPREHENSIVE METABOLIC PANEL - Abnormal; Notable for the following components:       Result Value    BUN 28 (*)     Creatinine 4.1 (*)     Albumin 3.3 (*)     Total Bilirubin 1.4 (*)     Alkaline Phosphatase 306 (*)     AST 65 (*)     Anion Gap 20 (*)     eGFR if  13 (*)     eGFR if non  11 (*)     All other components within normal limits   TROPONIN I - Abnormal; Notable for the following components:    Troponin I 0.479 (*)     All other components within normal limits   B-TYPE NATRIURETIC PEPTIDE - Abnormal; Notable for the following components:    BNP >4,900 (*)     All other components within normal limits   CBC W/ AUTO DIFFERENTIAL - Abnormal; Notable for the following components:    RBC 2.29 (*)     Hemoglobin 7.7 (*)     Hematocrit 25.0 (*)       (*)     MCH 33.6 (*)     MCHC 30.8 (*)     RDW 19.1 (*)     nRBC 1 (*)     All other components within normal limits   SARS-COV-2 RDRP GENE   TYPE & SCREEN        ECG Results          EKG 12-LEAD (Final result)  Result time 06/29/22 11:07:52    Final result by Unknown User (06/29/22 11:07:52)                                Imaging Results          X-Ray Chest AP Portable (Final result)  Result time 06/28/22 10:09:32    Final result by Leeroy Vaca MD (06/28/22 10:09:32)                 Narrative:    EXAMINATION:  XR CHEST AP PORTABLE    CLINICAL HISTORY:  Chest Pain;    TECHNIQUE:  Single frontal view of the chest was performed.    COMPARISON:  None 04/19/2021    FINDINGS:  ICD identified as before.  Vascular stent in the left axilla.  Cardiomegaly and the diffuse accentuation interstitial markings.  Small patchy opacities noted at the right lung base.  There is suggestion of a small left-sided pleural effusion.  The upper lung fields are clear.  See above      Electronically signed by: Leeroy Vaca MD  Date:    06/28/2022  Time:    10:09                               Medications   cabozantinib Tab 60 mg (60 mg Oral Given 6/29/22 0823)   metoprolol succinate (TOPROL-XL) 24 hr tablet 100 mg (100 mg Oral Given 6/29/22 0822)   sevelamer carbonate tablet 1,600 mg (1,600 mg Oral Not Given 6/29/22 1200)   sodium chloride 0.9% flush 10 mL (has no administration in time range)   naloxone 0.4 mg/mL injection 0.02 mg (has no administration in time range)   glucose chewable tablet 16 g (has no administration in time range)   glucose chewable tablet 24 g (has no administration in time range)   glucagon (human recombinant) injection 1 mg (has no administration in time range)   dextrose 10% bolus 125 mL (has no administration in time range)   dextrose 10% bolus 250 mL (has no administration in time range)   acetaminophen tablet 650 mg (has no administration in time range)   apixaban tablet 5 mg (5 mg Oral Given  6/29/22 0822)   0.9%  NaCl infusion (for blood administration) (has no administration in time range)   mupirocin 2 % ointment (has no administration in time range)   metoprolol injection 5 mg (5 mg Intravenous Given 6/28/22 1056)   metoprolol injection 5 mg (5 mg Intravenous Given 6/28/22 1140)   metoprolol injection 5 mg (5 mg Intravenous Given 6/28/22 1211)   metoprolol succinate (TOPROL-XL) 24 hr tablet 100 mg (100 mg Oral Given 6/28/22 1234)     Medical Decision Making:   History:   Old Medical Records: I decided to obtain old medical records.  Independently Interpreted Test(s):   I have ordered and independently interpreted EKG Reading(s) - see prior notes  Clinical Tests:   Lab Tests: Ordered and Reviewed  Radiological Study: Ordered and Reviewed  Medical Tests: Ordered and Reviewed    MDM:    56-year-old female with past medical history as noted above presenting with shortness of breath, fatigue, palpitations.  Patient found to be in AFib with RVR, also noted to be pale with recent blood work suggesting symptomatic anemia given her recent issues with ongoing epistaxis.  Patient is not currently anticoagulated.  Patient with new onset AFib.  Given multiple rounds of IV Lopressor with rate control, given her oral metoprolol with improvement at this point.  Given her presentation of in AFib with RVR and mildly decreased hemoglobin transition care to hospitalist team for further evaluation and management, troponin elevated although likely secondary to demand from her rapid ventricular response.  Discussed findings today, need for continued management overnight with patient and  at bedside, all questions answered, patient stable for transfer to the floor at this time.          Scribe Attestation:   Scribe #1: I performed the above scribed service and the documentation accurately describes the services I performed. I attest to the accuracy of the note.                 Clinical Impression:   Final  diagnoses:  [R07.9] Chest pain  [M79.601] Right arm pain  [I48.92] Atrial flutter with rapid ventricular response          ED Disposition Condition    Admit           I, Anam Bullock M.D., personally performed the services described in this documentation. All medical record entries made by the scribe were at my direction and in my presence. I have reviewed the chart and agree that the record reflects my personal performance and is accurate and complete.     Anam Bullock MD  06/29/22 [id number]

## 2022-06-28 NOTE — NURSING
Patient arrived to floor via stretcher via transporter from ED. Patient transferred to bed via x1 person assist. AAOx4. Patient was oriented to room, information on whiteboard, and medication regimen. Bed low, adequate lighting provided, side rails x2 up, call bell within reach. Admission assessment completed. VSS. Patient denied having any acute distress at this time. None observed. Will continue to monitor and follow treatment plan. Family at bedside.

## 2022-06-28 NOTE — NURSING
[facility] [place] [facility] [place]      6/28/2022       Skin assessed on: 06/28/2022        [] No Pressure Injuries Present    []Prevention Measures Documented    [x] Yes LDA  for Pressure Injury Previously documented   Darken area to bottom nonblanchable, discussed with Lilly   [] Yes New Pressure Injury Discovered   [] LDA for New Pressure Injury Added      Attending RN:  Teresita Haro RN     Second PCT :  Lara Banda

## 2022-06-28 NOTE — SUBJECTIVE & OBJECTIVE
Past Medical History:   Diagnosis Date    Anemia     Bronchitis 03/01/2017    Cancer 2016    kidney cancer    CHF (congestive heart failure), NYHA class II, chronic, systolic     CMV (cytomegalovirus) antibody positive     Essential hypertension 9/23/2015    H/O herpes simplex type 2 infection     Herpes simplex type 1 antibody positive     History of kidney cancer     s/p left nephrectomy 1/2016    Hyperparathyroidism, unspecified     Hyperuricemia without signs of inflammatory arthritis and tophaceous disease     Kidney stones     LGSIL (low grade squamous intraepithelial dysplasia)     Myocardiopathy 7/21/2017    Prediabetes     Proteinuria     Renal disorder     Thyroid nodule     Urate nephropathy        Past Surgical History:   Procedure Laterality Date    BREAST CYST EXCISION      COLONOSCOPY N/A 11/12/2015    COLONOSCOPY N/A 3/12/2021    Procedure: COLONOSCOPY;  Surgeon: Brendon Lanier MD;  Location: [facility];  Service: Endoscopy;  Laterality: N/A;  covid test 3/9, labs prior, prep instr mailed -ml    INSERTION OF BIVENTRICULAR IMPLANTABLE CARDIOVERTER-DEFIBRILLATOR (ICD)  04/2021    NEPHRECTOMY-LAPAROSCOPIC Left 01/12/2016    PERITONEAL CATHETER INSERTION      Permacath insertion  01/12/2017    SALPINGOOPHORECTOMY Right 2016    KJB---DAVINCI    TONSILLECTOMY      TUBAL LIGATION         Review of patient's allergies indicates:   Allergen Reactions    Coreg [carvedilol] Other (See Comments)     Nausea/vomiting    Allopurinol      Other reaction(s): abnormal transaminases       No current facility-administered medications on file prior to encounter.     Current Outpatient Medications on File Prior to Encounter   Medication Sig    acetaminophen (TYLENOL) 500 MG tablet Take 500 mg by mouth every 6 (six) hours as needed for Pain.    aluminum-magnesium hydroxide-simethicone (MAALOX) 200-200-20 mg/5 mL Susp Take 30 mLs by mouth every 6 (six) hours as needed.     cabozantinib  (CABOMETYX) 60 mg Tab Take 60 mg by mouth once daily.    cloNIDine 0.3 mg/24 hr td ptwk (CATAPRES) 0.3 mg/24 hr Place 1 patch onto the skin every 7 days.    epoetin david-epbx (RETACRIT INJ) Epoetin david - epbx (Retacrit)    isosorbide mononitrate (IMDUR) 30 MG 24 hr tablet Take 1 tablet (30 mg total) by mouth once daily.    lidocaine-prilocaine (EMLA) cream APPLY ATLEAST 30 MINUTES BEFORE TREATMENT 3 TIMES A WEEK    mv,Ca,min-folic acid-vit K1 (ONE-A-DAY WOMEN'S 50 PLUS) 400-20 mcg Tab Take 1 tablet by mouth.    olmesartan (BENICAR) 40 MG tablet Take 1 tablet (40 mg total) by mouth once daily.    sevelamer carbonate (RENVELA) 800 mg Tab Take 2 tablets (1,600 mg total) by mouth 3 (three) times daily with meals.    [DISCONTINUED] hydrALAZINE (APRESOLINE) 100 MG tablet TAKE 1 TABLET BY MOUTH EVERY 12 HOURS    amLODIPine (NORVASC) 5 MG tablet TAKE 1 TABLET BY MOUTH EVERY DAY    metoprolol succinate (TOPROL-XL) 100 MG 24 hr tablet Take 1 tablet (100 mg total) by mouth once daily.    sodium chloride (SALINE NASAL) 0.65 % nasal spray 1 spray by Nasal route as needed (Nasal dryness). (Patient not taking: Reported on 6/22/2022)    sodium chloride-aloe vera (SALINE NASAL, ALOE VERA,) Gel 1 application by Nasal route 2 (two) times a day. (Patient not taking: Reported on 6/22/2022)    sucroferric oxyhydroxide (VELPHORO) 500 mg Chew Take 1 tablet (500 mg total) by mouth 3 (three) times daily.    [DISCONTINUED] epoetin david-epbx (RETACRIT INJ) Epoetin david - epbx (Retacrit)    [DISCONTINUED] hydrocortisone 2.5 % cream Apply topically 2 (two) times daily. for 14 days    [DISCONTINUED] LORazepam (ATIVAN) 0.5 MG tablet Take 1 tablet (0.5 mg total) by mouth every 6 (six) hours as needed for Anxiety.    [DISCONTINUED] multivitamin-Ca-iron-minerals 27-0.4 mg Tab Take by mouth. 1 Tablet Oral Every day     Family History       Problem Relation (Age of Onset)    Diabetes Father, Maternal Grandfather    Heart disease  Sister    Hypertension Mother    Kidney disease Father    No Known Problems Son, Son, Sister, Brother, Maternal Grandmother, Paternal Grandmother, Paternal Grandfather, Sister, Brother, Maternal Aunt, Maternal Uncle, Paternal Aunt, Paternal Uncle          Tobacco Use    Smoking status: Never Smoker    Smokeless tobacco: Never Used   Substance and Sexual Activity    Alcohol use: No     Comment: . 2 children. works at [employer].    Drug use: No    Sexual activity: Yes     Partners: Male     Review of Systems   Constitutional:  Positive for fatigue. Negative for chills and fever.   HENT:  Positive for nosebleeds. Negative for congestion, rhinorrhea and sinus pain.    Eyes:  Negative for visual disturbance.   Respiratory:  Negative for cough and shortness of breath.    Cardiovascular:  Positive for palpitations. Negative for chest pain and leg swelling.   Gastrointestinal:  Negative for abdominal pain, diarrhea and nausea.   Endocrine: Negative for polyuria.   Genitourinary:  Negative for dysuria and hematuria.   Musculoskeletal:  Negative for arthralgias and back pain.   Skin:  Negative for rash.   Neurological:  Negative for dizziness and numbness.   Psychiatric/Behavioral:  Negative for agitation and confusion. The patient is not hyperactive.    Objective:     Vital Signs (Most Recent):  Temp: 98.7 °F (37.1 °C) (06/28/22 0936)  Pulse: 79 (06/28/22 1301)  Resp: (!) 27 (06/28/22 1301)  BP: 128/73 (06/28/22 1301)  SpO2: 99 % (06/28/22 1301)   Vital Signs (24h Range):  Temp:  [98.7 °F (37.1 °C)] 98.7 °F (37.1 °C)  Pulse:  [] 79  Resp:  [16-31] 27  SpO2:  [94 %-100 %] 99 %  BP: (110-129)/(73-90) 128/73     Weight: 55.8 kg (123 lb)  Body mass index is 21.11 kg/m².    Physical Exam  Vitals and nursing note reviewed.   Constitutional:       General: She is not in acute distress.     Appearance: Normal appearance. She is ill-appearing.   HENT:      Head: Normocephalic.   Eyes:      General: No scleral  icterus.     Conjunctiva/sclera: Conjunctivae normal.   Cardiovascular:      Rate and Rhythm: Normal rate and regular rhythm.      Pulses: Normal pulses.      Comments: In NSR on my exam  Fistula to left upper arm  Pulmonary:      Effort: Pulmonary effort is normal.      Breath sounds: Normal breath sounds.   Abdominal:      General: Bowel sounds are normal. There is no distension.      Tenderness: There is no abdominal tenderness.   Musculoskeletal:         General: No swelling. Normal range of motion.   Skin:     General: Skin is warm and dry.      Coloration: Skin is pale.   Neurological:      General: No focal deficit present.      Mental Status: She is alert and oriented to person, place, and time.   Psychiatric:         Mood and Affect: Mood normal.         Thought Content: Thought content normal.         Judgment: Judgment normal.           Significant Labs: All pertinent labs within the past 24 hours have been reviewed.    Significant Imaging: I have reviewed all pertinent imaging results/findings within the past 24 hours.

## 2022-06-28 NOTE — ASSESSMENT & PLAN NOTE
- Hb has decreased over the last few weeks due to acute blood loss via multiple nose bleeds  - Hb now 7.6 and patient is symptomatic with weakness  - BP is much lower than usual as well  - transfuse 1 unit pRBC and monitor

## 2022-06-28 NOTE — ED NOTES
Pt. Is now complaining of right shoulder pain that radiates to her back. Pt. Rates her pain at a 9/10 on the pain scale.  was informed and new 12 lead was done.

## 2022-06-28 NOTE — ASSESSMENT & PLAN NOTE
This is a new diagnosis. ECG showed atrial fibrillation with RVR on admission. Converted to sinus with metoprolol in ED.  Discussed with patient and her  about new diagnosis and treatment with eliquis. SAYRAC 2.   - has hx of HFrEF with AICD  - recheck echo (last one noted almost 1 year ago) to see if any changes  - suspect dehydration/blood loss exacerbated  - continue home metoprolol

## 2022-06-29 ENCOUNTER — TELEPHONE (OUTPATIENT)
Dept: HEMATOLOGY/ONCOLOGY | Facility: CLINIC | Age: 57
End: 2022-06-29
Payer: MEDICARE

## 2022-06-29 VITALS
DIASTOLIC BLOOD PRESSURE: 65 MMHG | RESPIRATION RATE: 18 BRPM | HEIGHT: 64 IN | WEIGHT: 122.56 LBS | BODY MASS INDEX: 20.92 KG/M2 | HEART RATE: 64 BPM | TEMPERATURE: 97 F | OXYGEN SATURATION: 97 % | SYSTOLIC BLOOD PRESSURE: 139 MMHG

## 2022-06-29 LAB
ASCENDING AORTA: 3.1 CM
AV INDEX (PROSTH): 0.63
AV MEAN GRADIENT: 8 MMHG
AV PEAK GRADIENT: 16 MMHG
AV VALVE AREA: 1.95 CM2
AV VELOCITY RATIO: 0.56
BASOPHILS # BLD AUTO: 0.08 K/UL (ref 0–0.2)
BASOPHILS NFR BLD: 1.1 % (ref 0–1.9)
BSA FOR ECHO PROCEDURE: 1.58 M2
CV ECHO LV RWT: 0.43 CM
DIFFERENTIAL METHOD: ABNORMAL
DOP CALC AO PEAK VEL: 2 M/S
DOP CALC AO VTI: 35.44 CM
DOP CALC LVOT AREA: 3.1 CM2
DOP CALC LVOT DIAMETER: 1.99 CM
DOP CALC LVOT PEAK VEL: 1.12 M/S
DOP CALC LVOT STROKE VOLUME: 69.23 CM3
DOP CALCLVOT PEAK VEL VTI: 22.27 CM
E WAVE DECELERATION TIME: 190.48 MSEC
E/A RATIO: 0.97
E/E' RATIO: 24.2 M/S
ECHO LV POSTERIOR WALL: 1.19 CM (ref 0.6–1.1)
EJECTION FRACTION: 35 %
EOSINOPHIL # BLD AUTO: 0.1 K/UL (ref 0–0.5)
EOSINOPHIL NFR BLD: 1.3 % (ref 0–8)
ERYTHROCYTE [DISTWIDTH] IN BLOOD BY AUTOMATED COUNT: 19.7 % (ref 11.5–14.5)
FRACTIONAL SHORTENING: 17 % (ref 28–44)
HCT VFR BLD AUTO: 27.9 % (ref 37–48.5)
HGB BLD-MCNC: 8.9 G/DL (ref 12–16)
IMM GRANULOCYTES # BLD AUTO: 0.02 K/UL (ref 0–0.04)
IMM GRANULOCYTES NFR BLD AUTO: 0.3 % (ref 0–0.5)
INTERVENTRICULAR SEPTUM: 1.35 CM (ref 0.6–1.1)
IVRT: 80.74 MSEC
LA MAJOR: 6.61 CM
LA MINOR: 6.35 CM
LA WIDTH: 4.65 CM
LEFT ATRIUM SIZE: 4.03 CM
LEFT ATRIUM VOLUME INDEX: 64.9 ML/M2
LEFT ATRIUM VOLUME: 103.18 CM3
LEFT INTERNAL DIMENSION IN SYSTOLE: 4.61 CM (ref 2.1–4)
LEFT VENTRICLE DIASTOLIC VOLUME INDEX: 94.06 ML/M2
LEFT VENTRICLE DIASTOLIC VOLUME: 149.56 ML
LEFT VENTRICLE MASS INDEX: 187 G/M2
LEFT VENTRICLE SYSTOLIC VOLUME INDEX: 61.4 ML/M2
LEFT VENTRICLE SYSTOLIC VOLUME: 97.65 ML
LEFT VENTRICULAR INTERNAL DIMENSION IN DIASTOLE: 5.53 CM (ref 3.5–6)
LEFT VENTRICULAR MASS: 297.16 G
LV LATERAL E/E' RATIO: 20.17 M/S
LV SEPTAL E/E' RATIO: 30.25 M/S
LYMPHOCYTES # BLD AUTO: 1.3 K/UL (ref 1–4.8)
LYMPHOCYTES NFR BLD: 17.9 % (ref 18–48)
MCH RBC QN AUTO: 33.5 PG (ref 27–31)
MCHC RBC AUTO-ENTMCNC: 31.9 G/DL (ref 32–36)
MCV RBC AUTO: 105 FL (ref 82–98)
MONOCYTES # BLD AUTO: 0.6 K/UL (ref 0.3–1)
MONOCYTES NFR BLD: 7.8 % (ref 4–15)
MV PEAK A VEL: 1.25 M/S
MV PEAK E VEL: 1.21 M/S
NEUTROPHILS # BLD AUTO: 5.3 K/UL (ref 1.8–7.7)
NEUTROPHILS NFR BLD: 71.6 % (ref 38–73)
NRBC BLD-RTO: 1 /100 WBC
PISA MRMAX VEL: 0.05 M/S
PISA TR MAX VEL: 3.31 M/S
PLATELET # BLD AUTO: 186 K/UL (ref 150–450)
PMV BLD AUTO: 9.9 FL (ref 9.2–12.9)
PULM VEIN S/D RATIO: 1.15
PV PEAK D VEL: 0.4 M/S
PV PEAK S VEL: 0.46 M/S
PV PEAK VELOCITY: 1.28 CM/S
RA MAJOR: 5.68 CM
RA PRESSURE: 3 MMHG
RA WIDTH: 4.86 CM
RBC # BLD AUTO: 2.66 M/UL (ref 4–5.4)
RIGHT VENTRICULAR END-DIASTOLIC DIMENSION: 4.99 CM
RV TISSUE DOPPLER FREE WALL SYSTOLIC VELOCITY 1 (APICAL 4 CHAMBER VIEW): 14.05 CM/S
SINUS: 3.47 CM
STJ: 2.47 CM
TDI LATERAL: 0.06 M/S
TDI SEPTAL: 0.04 M/S
TDI: 0.05 M/S
TR MAX PG: 44 MMHG
TRICUSPID ANNULAR PLANE SYSTOLIC EXCURSION: 2.34 CM
TV REST PULMONARY ARTERY PRESSURE: 47 MMHG
WBC # BLD AUTO: 7.43 K/UL (ref 3.9–12.7)

## 2022-06-29 PROCEDURE — 94760 N-INVAS EAR/PLS OXIMETRY 1: CPT

## 2022-06-29 PROCEDURE — 36415 COLL VENOUS BLD VENIPUNCTURE: CPT | Performed by: STUDENT IN AN ORGANIZED HEALTH CARE EDUCATION/TRAINING PROGRAM

## 2022-06-29 PROCEDURE — 85025 COMPLETE CBC W/AUTO DIFF WBC: CPT | Performed by: STUDENT IN AN ORGANIZED HEALTH CARE EDUCATION/TRAINING PROGRAM

## 2022-06-29 PROCEDURE — 25000003 PHARM REV CODE 250: Performed by: STUDENT IN AN ORGANIZED HEALTH CARE EDUCATION/TRAINING PROGRAM

## 2022-06-29 RX ORDER — MUPIROCIN 20 MG/G
OINTMENT TOPICAL 2 TIMES DAILY
Status: DISCONTINUED | OUTPATIENT
Start: 2022-06-29 | End: 2022-06-29 | Stop reason: HOSPADM

## 2022-06-29 RX ADMIN — METOPROLOL SUCCINATE 100 MG: 50 TABLET, EXTENDED RELEASE ORAL at 08:06

## 2022-06-29 RX ADMIN — APIXABAN 5 MG: 5 TABLET, FILM COATED ORAL at 08:06

## 2022-06-29 NOTE — NURSING
Pt discharged per MD order. IV removed. Catheter tip intact. No distress noted. Discharge instructions explained. AVS given to patient and placed in Blue Folder. Pt verbalized understanding. VSS. Afebrile. No complaints of pain, N/V, diarrhea, or SOB. Rx brought to patient room from [facility]. Patient has all belongings packed and waiting for transport, family at bedside.

## 2022-06-29 NOTE — PLAN OF CARE
[facility] - Med Surg  Initial Discharge Assessment  Independent, lives with spouse Otis.  Spouse drives her to appts.     Otis Bloom (Spouse)   [phone] (Mobile)  Primary Care Provider: Sowmya Mccain MD    Admission Diagnosis: Right arm pain [M79.601]  Atrial flutter with rapid ventricular response [I48.92]  Chest pain [R07.9]  Atrial fibrillation with RVR [I48.91]    Admission Date: 6/28/2022  Expected Discharge Date: 6/29/2022    Discharge Barriers Identified: None    Payor: BLUE CROSS BLUE SHIELD / Plan: BCBS ALL OUT OF STATE / Product Type: PPO /     Extended Emergency Contact Information  Primary Emergency Contact: Oits Bloom  Address: [address] [place] [address] [facility]  Home Phone: [phone]  Mobile Phone: [phone]  Relation: Spouse    Discharge Plan A: Home with family  Discharge Plan B: Home with family      [id number] [place] - [address] [facility]  [address] [facility] [place] [address]  Phone: [phone] Fax: [phone]    [facility]  [place] - [address]  [address]  [place] [address]  Phone: [phone] Fax: [phone]    [facility]  [address] [facility] [address]  Phone: [phone] Fax: [phone]      Initial Assessment (most recent)     Adult Discharge Assessment - 06/29/22 1040        Discharge Assessment    Assessment Type Discharge Planning Assessment     Confirmed/corrected address, phone number and insurance Yes     Confirmed Demographics Correct on Facesheet     Source of Information patient     Does patient/caregiver understand observation status Yes     Communicated MAVIS with patient/caregiver Yes     Reason For Admission AFib, Chest pain     Lives With spouse     Do you expect to return to your current living situation? Yes     Do you have help at home or someone to help you manage your care at home? Yes     Who are your caregiver(s) and  their phone number(s)? WolfOtis (Spouse)   [phone] (Mobile)     Prior to hospitilization cognitive status: Alert/Oriented     Current cognitive status: Alert/Oriented     Walking or Climbing Stairs Difficulty none     Dressing/Bathing Difficulty none     Home Accessibility wheelchair accessible     Home Layout Able to live on 1st floor     Equipment Currently Used at Home none     Readmission within 30 days? No     Patient currently being followed by outpatient case management? Unable to determine (comments)     Do you currently have service(s) that help you manage your care at home? No     Do you take prescription medications? Yes     Do you have prescription coverage? Yes     Coverage BLUE CROSS BLUE SHIELD - BCBS ALL OUT OF STATE     Do you have any problems affording any of your prescribed medications? No     Who is going to help you get home at discharge? WolfOtis (Spouse)   [phone] (Mobile)     Are you on dialysis? No     Do you take coumadin? No     Discharge Plan A Home with family     Discharge Plan B Home with family     DME Needed Upon Discharge  none     Discharge Plan discussed with: Patient     Discharge Barriers Identified None        Relationship/Environment    Name(s) of Who Lives With Patient Otis Bloom (Spouse)   [phone] (Mobile)

## 2022-06-29 NOTE — NURSING
Pt aaox4. Able to make needs known. Pt  Ambulatory with standby assist. No c/o pain/distress/sob at this time.  at bedside. Hourly rounds made. Will continue to monitor.  telebox # [phone] in place

## 2022-06-29 NOTE — DISCHARGE SUMMARY
[facility] Medicine  Discharge Summary      Patient Name: Eva Bloom  MRN: [id number]  Patient Class: IP- Inpatient  Admission Date: 6/28/2022  Hospital Length of Stay: 1 days  Discharge Date and Time: No discharge date for patient encounter.  Attending Physician: Leonardo Robledo MD   Discharging Provider: Leonardo Robledo MD  Primary Care Provider: Sowmya Mccain MD      HPI:   Mrs. Bloom is a 55 yo F with extensive PMHx influding HFrEF with AICD, ESRD on HD, renal cancer, and HTN who presents to the ED for evaluation of weakness and palpitations. Her  explains the patient has been having ongoing nosebleeds and has lot a lot of blood from this over the last few weeks. Recently seen by ENT last week and has had no further bleeding. Since this past weekend, patient has been more weak and fatigued. This morning, she went to HD and had palpitations and felt like her heart was racing. She was told her heart rate was fast at that time. She came to the ED afterwards to be evaluated.  In the ED, she was found to be in atrial fibrillation with RVR. This is a new diagnosis for her. She was given IV lopressor and her home oral metoprolol. She eventually converted to normal sinus rhythm. Atrial fibrillation is a new diagnosis for her. She was admitted to hospital medicine for further management.      * No surgery found *      Hospital Course:   Admitted with atrial fibrillation with RVR and weakness. Given Metoprolol and converted to NSR. After further history, patient has hx of recent blood loss via nose bleeding. Hb 7.6 --> given 1 unit pRBC and improvement to 8.9. Patient feeling much better and with more energy. Discussed start Eliquis for stroke prevention with Afib. Echo repeated, abnormal but still similar to previous echo in Feb 2021 with reduced EF, posterior pericardial effusion and pulm HTN - which seems improved. Stable for d/c home to follow up with her Cardiologist.       Goals of  Care Treatment Preferences:  Code Status: Full Code      Consults:   Consults (From admission, onward)        Status Ordering Provider     Inpatient consult to Nephrology  Once        Provider:  Floyd Motley MD    Acknowledged AVERY HAMILTON          No new Assessment & Plan notes have been filed under this hospital service since the last note was generated.  Service: Hospital Medicine    Final Active Diagnoses:    Diagnosis Date Noted POA    PRINCIPAL PROBLEM:  Atrial fibrillation with RVR [I48.91] 06/28/2022 Yes    Anemia in ESRD (end-stage renal disease) [N18.6, D63.1]  Yes    ESRD (end stage renal disease) on dialysis [N18.6, Z99.2]  Not Applicable    Cardiomyopathy, nonischemic [I42.8] 07/21/2017 Yes    Essential hypertension [I10] 09/23/2015 Yes      Problems Resolved During this Admission:       Discharged Condition: stable    Disposition: Home or Self Care    Follow Up:   Follow-up Information     Sowmya Mccain MD Follow up.    Specialties: Internal Medicine, Pediatrics  Contact information:  [facility] [facility]  [place] [address] [phone]                       Patient Instructions:      Diet renal     Notify your health care provider if you experience any of the following:  temperature >100.4     Notify your health care provider if you experience any of the following:  persistent nausea and vomiting or diarrhea     Notify your health care provider if you experience any of the following:  severe uncontrolled pain     Notify your health care provider if you experience any of the following:  difficulty breathing or increased cough     Notify your health care provider if you experience any of the following:  severe persistent headache     Notify your health care provider if you experience any of the following:  worsening rash     Notify your health care provider if you experience any of the following:  persistent dizziness, light-headedness, or visual disturbances     Notify your health  care provider if you experience any of the following:  increased confusion or weakness     Activity as tolerated       Significant Diagnostic Studies: Labs: All labs within the past 24 hours have been reviewed    Pending Diagnostic Studies:     None         Medications:  Reconciled Home Medications:      Medication List      START taking these medications    ELIQUIS 2.5 mg Tab  Generic drug: apixaban  Take 1 tablet (2.5 mg total) by mouth 2 (two) times daily.        CONTINUE taking these medications    acetaminophen 500 MG tablet  Commonly known as: TYLENOL  Take 500 mg by mouth every 6 (six) hours as needed for Pain.     aluminum-magnesium hydroxide-simethicone 200-200-20 mg/5 mL Susp  Commonly known as: MAALOX  Take 30 mLs by mouth every 6 (six) hours as needed.     cabozantinib 60 mg Tab  Commonly known as: CABOMETYX  Take 60 mg by mouth once daily.     cloNIDine 0.3 mg/24 hr td ptwk 0.3 mg/24 hr  Commonly known as: CATAPRES  Place 1 patch onto the skin every 7 days.     isosorbide mononitrate 30 MG 24 hr tablet  Commonly known as: IMDUR  Take 1 tablet (30 mg total) by mouth once daily.     LIDOcaine-prilocaine cream  Commonly known as: EMLA  APPLY ATLEAST 30 MINUTES BEFORE TREATMENT 3 TIMES A WEEK     metoprolol succinate 100 MG 24 hr tablet  Commonly known as: TOPROL-XL  Take 1 tablet (100 mg total) by mouth once daily.     olmesartan 40 MG tablet  Commonly known as: BENICAR  Take 1 tablet (40 mg total) by mouth once daily.     ONE-A-DAY WOMEN'S 50 PLUS 400-20 mcg Tab  Generic drug: mv,Ca,min-folic acid-vit K1  Take 1 tablet by mouth.     RETACRIT INJ  Epoetin david - epbx (Retacrit)     sevelamer carbonate 800 mg Tab  Commonly known as: RENVELA  Take 2 tablets (1,600 mg total) by mouth 3 (three) times daily with meals.     VELPHORO 500 mg Chew  Generic drug: sucroferric oxyhydroxide  Take 1 tablet (500 mg total) by mouth 3 (three) times daily.        STOP taking these medications    amLODIPine 5 MG  tablet  Commonly known as: NORVASC     SALINE NASAL (ALOE VERA) Gel  Generic drug: sodium chloride-aloe vera     sodium chloride 0.65 % nasal spray  Commonly known as: Saline NasaL            Indwelling Lines/Drains at time of discharge:   Lines/Drains/Airways     Central Venous Catheter Line  Duration                Hemodialysis AV Graft Left upper arm -- days         Hemodialysis Catheter 01/12/17 0627 1994 days          Drain  Duration                Hemodialysis AV Fistula Left upper arm -- days         Hemodialysis AV Fistula 01/12/17 0629 Left upper arm 1994 days                Time spent on the discharge of patient: >35 minutes         Leonardo Robledo MD  Department of Hospital Medicine  [facility] Surg

## 2022-06-29 NOTE — HOSPITAL COURSE
Admitted with atrial fibrillation with RVR and weakness. Given Metoprolol and converted to NSR. After further history, patient has hx of recent blood loss via nose bleeding. Hb 7.6 --> given 1 unit pRBC and improvement to 8.9. Patient feeling much better and with more energy. Discussed start Eliquis for stroke prevention with Afib. Echo repeated, abnormal but still similar to previous echo in Feb 2021 with reduced EF, posterior pericardial effusion and pulm HTN - which seems improved. Stable for d/c home to follow up with her Cardiologist.

## 2022-06-29 NOTE — PLAN OF CARE
[facility] - Med Surg  Discharge Final Note  TN sent secure chat to med surg nurse  Teresita that the patient is cleared for discharge from case management's viewpoint.  TN reviewed appts with patient.  Primary Care Provider: Sowmya Mccain MD    Expected Discharge Date: 6/29/2022    Final Discharge Note (most recent)     Final Note - 06/29/22 1053        Final Note    Assessment Type Final Discharge Note     Anticipated Discharge Disposition Home or Self Care     What phone number can be called within the next 1-3 days to see how you are doing after discharge? --   see chart    Hospital Resources/Appts/Education Provided Provided patient/caregiver with written discharge plan information;Appointments scheduled and added to AVS        Post-Acute Status    Coverage [facility] BLUE SHIELD - BCBS ALL OUT OF STATE     Discharge Delays None known at this time                 Important Message from Medicare             Contact Info     Sowmya Mccain MD   Specialty: Internal Medicine, Pediatrics   Relationship: PCP - General    [facility] [place] [address]   Phone: [phone]       Next Steps: Follow up

## 2022-06-29 NOTE — CONSULTS
"                                         Renal Consult    Date of Admission:  6/28/2022  9:39 AM        Chief Complaint:   Chief Complaint   Patient presents with    Fatigue    Palpitations     Pt reports nausea, fatigue, and heart palpitations x 1 week. Pt states her symptoms progressed after dialysis this morning. Pt denies sob, chest pain, vomiting, or diarrhea. Pt goes to dialysis Tuesday,Thursday,Saturday.       HPI: 57 y/o F with extensive PMHx. Including:  HFrEF with AICD, ESRD on HD (followed by DR. Hart [facility] [place]) renal cancer s/p L-Nephrectomy and HTN who presents to the ED for evaluation of weakness and palpitations.  As per H&P: "Her  explains the patient has been having ongoing nosebleeds and has lot a lot of blood from this over the last few weeks. Recently seen by ENT last week and has had no further bleeding. Since this past weekend, patient has been more weak and fatigued. This morning, she went to HD and had palpitations and felt like her heart was racing. She was told her heart rate was fast at that time. She came to the ED afterwards to be evaluated.  In the ED, she was found to be in atrial fibrillation with RVR. This is a new diagnosis for her. She was given IV lopressor and her home oral metoprolol. She eventually converted to normal sinus rhythm. Atrial fibrillation is a new diagnosis for her. She was admitted to hospital medicine for further management".    PMH:  Past Medical History:   Diagnosis Date    Anemia     Bronchitis 03/01/2017    Cancer 2016    kidney cancer    CHF (congestive heart failure), NYHA class II, chronic, systolic     CMV (cytomegalovirus) antibody positive     Essential hypertension 9/23/2015    H/O herpes simplex type 2 infection     Herpes simplex type 1 antibody positive     History of kidney cancer     s/p left nephrectomy 1/2016    Hyperparathyroidism, unspecified     Hyperuricemia without signs of inflammatory arthritis and tophaceous disease     " Kidney stones     LGSIL (low grade squamous intraepithelial dysplasia)     Myocardiopathy 7/21/2017    Prediabetes     Proteinuria     Renal disorder     Thyroid nodule     Urate nephropathy        PSH:  Past Surgical History:   Procedure Laterality Date    BREAST CYST EXCISION      COLONOSCOPY N/A 11/12/2015    COLONOSCOPY N/A 3/12/2021    Procedure: COLONOSCOPY;  Surgeon: Brendon Lanier MD;  Location[facility];  Service: Endoscopy;  Laterality: N/A;  covid test 3/9, labs prior, prep instr mailed -ml    INSERTION OF BIVENTRICULAR IMPLANTABLE CARDIOVERTER-DEFIBRILLATOR (ICD)  04/2021    NEPHRECTOMY-LAPAROSCOPIC Left 01/12/2016    PERITONEAL CATHETER INSERTION      Permacath insertion  01/12/2017    SALPINGOOPHORECTOMY Right 2016    KJB---DAVINCI    TONSILLECTOMY      TUBAL LIGATION         Allergies:  Review of patient's allergies indicates:   Allergen Reactions    Coreg [carvedilol] Other (See Comments)     Nausea/vomiting    Allopurinol      Other reaction(s): abnormal transaminases       No current facility-administered medications on file prior to encounter.     Current Outpatient Medications on File Prior to Encounter   Medication Sig Dispense Refill    acetaminophen (TYLENOL) 500 MG tablet Take 500 mg by mouth every 6 (six) hours as needed for Pain.      aluminum-magnesium hydroxide-simethicone (MAALOX) 200-200-20 mg/5 mL Susp Take 30 mLs by mouth every 6 (six) hours as needed.       cabozantinib (CABOMETYX) 60 mg Tab Take 60 mg by mouth once daily. 30 tablet 5    cloNIDine 0.3 mg/24 hr td ptwk (CATAPRES) 0.3 mg/24 hr Place 1 patch onto the skin every 7 days. 4 patch 11    epoetin david-epbx (RETACRIT INJ) Epoetin david - epbx (Retacrit)      isosorbide mononitrate (IMDUR) 30 MG 24 hr tablet Take 1 tablet (30 mg total) by mouth once daily. 30 tablet 11    lidocaine-prilocaine (EMLA) cream APPLY ATLEAST 30 MINUTES BEFORE TREATMENT 3 TIMES A WEEK 30 g 11    mv,Ca,min-folic acid-vit  K1 (ONE-A-DAY WOMEN'S 50 PLUS) 400-20 mcg Tab Take 1 tablet by mouth.      olmesartan (BENICAR) 40 MG tablet Take 1 tablet (40 mg total) by mouth once daily. 90 tablet 3    sevelamer carbonate (RENVELA) 800 mg Tab Take 2 tablets (1,600 mg total) by mouth 3 (three) times daily with meals. 540 tablet 0    amLODIPine (NORVASC) 5 MG tablet TAKE 1 TABLET BY MOUTH EVERY DAY 90 tablet 3    metoprolol succinate (TOPROL-XL) 100 MG 24 hr tablet Take 1 tablet (100 mg total) by mouth once daily. 90 tablet 3    sodium chloride (SALINE NASAL) 0.65 % nasal spray 1 spray by Nasal route as needed (Nasal dryness). (Patient not taking: Reported on 6/22/2022) 15 mL 0    sodium chloride-aloe vera (SALINE NASAL, ALOE VERA,) Gel 1 application by Nasal route 2 (two) times a day. (Patient not taking: Reported on 6/22/2022) 14.1 g 0    sucroferric oxyhydroxide (VELPHORO) 500 mg Chew Take 1 tablet (500 mg total) by mouth 3 (three) times daily. 270 tablet 0       Medications:  Current Facility-Administered Medications   Medication Dose Route Frequency Provider Last Rate Last Admin    0.9%  NaCl infusion (for blood administration)   Intravenous Q24H PRN Leonardo Robledo MD        acetaminophen tablet 650 mg  650 mg Oral Q4H PRN Leonardo Robledo MD        apixaban tablet 5 mg  5 mg Oral BID Leonardo Robledo MD   5 mg at 06/29/22 0822    cabozantinib Tab 60 mg  60 mg Oral Daily Leonardo Robledo MD   60 mg at 06/29/22 0823    dextrose 10% bolus 125 mL  12.5 g Intravenous PRN Leonardo Robledo MD        dextrose 10% bolus 250 mL  25 g Intravenous PRN Leonardo Robledo MD        glucagon (human recombinant) injection 1 mg  1 mg Intramuscular PRN Leonardo Robledo MD        glucose chewable tablet 16 g  16 g Oral PRN Leonardo Robledo MD        glucose chewable tablet 24 g  24 g Oral PRN Leonardo Robledo MD        metoprolol succinate (TOPROL-XL) 24 hr tablet 100 mg  100 mg Oral Daily Leonardo Robledo MD   100 mg at 06/29/22 0822    naloxone 0.4  mg/mL injection 0.02 mg  0.02 mg Intravenous PRN Leonardo Robledo MD        sevelamer carbonate tablet 1,600 mg  1,600 mg Oral TID WM Leonardo Robledo MD        sodium chloride 0.9% flush 10 mL  10 mL Intravenous Q12H PRN Leonardo Robledo MD           FamHx:  Family History   Problem Relation Age of Onset    Hypertension Mother     Diabetes Father     Kidney disease Father     No Known Problems Son     No Known Problems Son     Diabetes Maternal Grandfather     No Known Problems Sister     No Known Problems Brother     No Known Problems Maternal Grandmother     No Known Problems Paternal Grandmother     No Known Problems Paternal Grandfather     Heart disease Sister     No Known Problems Sister     No Known Problems Brother     No Known Problems Maternal Aunt     No Known Problems Maternal Uncle     No Known Problems Paternal Aunt     No Known Problems Paternal Uncle     Breast cancer Neg Hx     Colon cancer Neg Hx     Cancer Neg Hx     Stroke Neg Hx     Amblyopia Neg Hx     Blindness Neg Hx     Cataracts Neg Hx     Glaucoma Neg Hx     Macular degeneration Neg Hx     Retinal detachment Neg Hx     Strabismus Neg Hx     Thyroid disease Neg Hx        SocHx:  Social History     Socioeconomic History    Marital status:     Number of children: 2   Occupational History    Occupation: lucero     Employer: [id number]   Tobacco Use    Smoking status: Never Smoker    Smokeless tobacco: Never Used   Substance and Sexual Activity    Alcohol use: No     Comment: . 2 children. works at [employer].    Drug use: No    Sexual activity: Yes     Partners: Male     Social Determinants of Health     Financial Resource Strain: Low Risk     Difficulty of Paying Living Expenses: Not hard at all   Food Insecurity: No Food Insecurity    Worried About Running Out of Food in the Last Year: Never true    Ran Out of Food in the Last Year: Never true   Transportation Needs: No  Transportation Needs    Lack of Transportation (Medical): No    Lack of Transportation (Non-Medical): No   Physical Activity: Insufficiently Active    Days of Exercise per Week: 3 days    Minutes of Exercise per Session: 30 min   Stress: No Stress Concern Present    Feeling of Stress : Not at all   Social Connections: Socially Integrated    Frequency of Communication with Friends and Family: More than three times a week    Frequency of Social Gatherings with Friends and Family: Once a week    Attends Religion Services: 1 to 4 times per year    Active Member of Clubs or Organizations: Yes    Attends Club or Organization Meetings: 1 to 4 times per year    Marital Status:    Housing Stability: Low Risk     Unable to Pay for Housing in the Last Year: No    Number of Places Lived in the Last Year: 1    Unstable Housing in the Last Year: No           Review of Systems: see H&P       Physical Exam:  Vitals:   Vitals:    06/29/22 0730   BP: 127/67   Pulse: 67   Resp: 18   Temp: 97.2 °F (36.2 °C)       I/O last 3 completed shifts:  In: 401.7 [P.O.:120; Blood:281.7]  Out: -   I/O this shift:  In: 240 [P.O.:240]  Out: -     General: No apparent distress.   Neck: supple   Lungs: Unlabored breathing  Heart: RRR  Abdomen: n/a  Ext: n/a  Neurologic: Awake and alert, oriented x 3       Laboratories:    Recent Labs   Lab 06/29/22  0427   WBC 7.43   RBC 2.66*   HGB 8.9*   HCT 27.9*      *   MCH 33.5*   MCHC 31.9*       Recent Labs   Lab 06/28/22  0959   CALCIUM 8.8   PROT 7.6      K 3.9   CO2 23   CL 98   BUN 28*   CREATININE 4.1*   ALKPHOS 306*   ALT 35   AST 65*   BILITOT 1.4*       No results for input(s): COLORU, CLARITYU, SPECGRAV, PHUR, PROTEINUA, GLUCOSEU, BLOODU, WBCU, RBCU, BACTERIA, MUCUS in the last 24 hours.    Invalid input(s):  BILIRUBINCON    Microbiology Results (last 7 days)     ** No results found for the last 168 hours. **            Diagnostic Tests:    CXR:  FINDINGS:    ICD identified as before.  Vascular stent in the left axilla.  Cardiomegaly and the diffuse accentuation interstitial markings.  Small patchy opacities noted at the right lung base.  There is suggestion of a small left-sided pleural effusion.  The upper lung fields are clear.  See above       Electronically signed by: Leeroy Vaca MD   Date: 06/28/2022         Assessment:    57 y/o female with ESRD on HD admitted with:    - Atrial fibrillation with RVR  - Anemia multifactorial: CKD and blood loss (epistaxis)  - Non ischemic CMP  - HTN        Plan:    - Dialysis support Q TTS  - f/u H&H  - Transfuse as needed  - Epogen  - Renal diet  - Check PO4- and adjust binders  - Discharge planning and other problems per admitting

## 2022-06-29 NOTE — TELEPHONE ENCOUNTER
Spoke with patient to scheduled referral. Patient is only available mon, wed, and fri due to dialysis. Patient wants to wait for [facility] appointment. Patient scheduled for 9/2022 with dr. gardner and added to wait list. Patient and  verbalized understanding.

## 2022-07-01 ENCOUNTER — PATIENT OUTREACH (OUTPATIENT)
Dept: ADMINISTRATIVE | Facility: CLINIC | Age: 57
End: 2022-07-01
Payer: MEDICARE

## 2022-07-01 NOTE — PROGRESS NOTES
C3 nurse spoke with Eva Bloom   for a TCC post hospital discharge follow up call. The patient has a scheduled HOSFU appointment with Monisha Waller NP on 07/05/2022 @ 1000.

## 2022-07-05 ENCOUNTER — OFFICE VISIT (OUTPATIENT)
Dept: FAMILY MEDICINE | Facility: CLINIC | Age: 57
End: 2022-07-05
Payer: MEDICARE

## 2022-07-05 VITALS
OXYGEN SATURATION: 97 % | WEIGHT: 122.25 LBS | HEIGHT: 64 IN | HEART RATE: 80 BPM | TEMPERATURE: 98 F | DIASTOLIC BLOOD PRESSURE: 86 MMHG | BODY MASS INDEX: 20.87 KG/M2 | SYSTOLIC BLOOD PRESSURE: 140 MMHG

## 2022-07-05 DIAGNOSIS — I48.91 ATRIAL FIBRILLATION WITH RVR: ICD-10-CM

## 2022-07-05 DIAGNOSIS — D63.1 ANEMIA IN ESRD (END-STAGE RENAL DISEASE): ICD-10-CM

## 2022-07-05 DIAGNOSIS — Z99.2 ESRD (END STAGE RENAL DISEASE) ON DIALYSIS: ICD-10-CM

## 2022-07-05 DIAGNOSIS — N18.6 HYPERTENSIVE CKD, ESRD ON DIALYSIS: Primary | ICD-10-CM

## 2022-07-05 DIAGNOSIS — I10 ESSENTIAL HYPERTENSION: ICD-10-CM

## 2022-07-05 DIAGNOSIS — Z09 HOSPITAL DISCHARGE FOLLOW-UP: Primary | ICD-10-CM

## 2022-07-05 DIAGNOSIS — I12.0 HYPERTENSIVE CKD, ESRD ON DIALYSIS: Primary | ICD-10-CM

## 2022-07-05 DIAGNOSIS — Z99.2 HYPERTENSIVE CKD, ESRD ON DIALYSIS: Primary | ICD-10-CM

## 2022-07-05 DIAGNOSIS — N18.6 ESRD (END STAGE RENAL DISEASE) ON DIALYSIS: ICD-10-CM

## 2022-07-05 DIAGNOSIS — I42.8 CARDIOMYOPATHY, NONISCHEMIC: ICD-10-CM

## 2022-07-05 DIAGNOSIS — N18.6 ANEMIA IN ESRD (END-STAGE RENAL DISEASE): ICD-10-CM

## 2022-07-05 PROCEDURE — 99215 OFFICE O/P EST HI 40 MIN: CPT | Mod: PBBFAC,PO | Performed by: NURSE PRACTITIONER

## 2022-07-05 PROCEDURE — 99495 TCM SERVICES (MODERATE COMPLEXITY): ICD-10-PCS | Mod: S$PBB,,, | Performed by: NURSE PRACTITIONER

## 2022-07-05 PROCEDURE — 99495 TRANSJ CARE MGMT MOD F2F 14D: CPT | Mod: S$PBB,,, | Performed by: NURSE PRACTITIONER

## 2022-07-05 PROCEDURE — 99999 PR PBB SHADOW E&M-EST. PATIENT-LVL V: CPT | Mod: PBBFAC,,, | Performed by: NURSE PRACTITIONER

## 2022-07-05 PROCEDURE — 99999 PR PBB SHADOW E&M-EST. PATIENT-LVL V: ICD-10-PCS | Mod: PBBFAC,,, | Performed by: NURSE PRACTITIONER

## 2022-07-05 RX ORDER — METOPROLOL SUCCINATE 100 MG/1
100 TABLET, EXTENDED RELEASE ORAL DAILY
Qty: 90 TABLET | Refills: 0 | Status: SHIPPED | OUTPATIENT
Start: 2022-07-05 | End: 2022-11-18 | Stop reason: SDUPTHER

## 2022-07-05 NOTE — PROGRESS NOTES
Chief Complaint  Chief Complaint   Patient presents with    Hospital Follow Up       HPI    HPI   Ms. Eva Bloom is a 56 y.o. female with medical problems as listed below. The patient presents to clinic for hospitial follow up.     Hospital course as follows:    HPI:   Mrs. Bloom is a 57 yo F with extensive PMHx influding HFrEF with AICD, ESRD on HD, renal cancer, and HTN who presents to the ED for evaluation of weakness and palpitations. Her  explains the patient has been having ongoing nosebleeds and has lot a lot of blood from this over the last few weeks. Recently seen by ENT last week and has had no further bleeding. Since this past weekend, patient has been more weak and fatigued. This morning, she went to HD and had palpitations and felt like her heart was racing. She was told her heart rate was fast at that time. She came to the ED afterwards to be evaluated.  In the ED, she was found to be in atrial fibrillation with RVR. This is a new diagnosis for her. She was given IV lopressor and her home oral metoprolol. She eventually converted to normal sinus rhythm. Atrial fibrillation is a new diagnosis for her. She was admitted to hospital medicine for further management.        * No surgery found *       Hospital Course:   Admitted with atrial fibrillation with RVR and weakness. Given Metoprolol and converted to NSR. After further history, patient has hx of recent blood loss via nose bleeding. Hb 7.6 --> given 1 unit pRBC and improvement to 8.9. Patient feeling much better and with more energy. Discussed start Eliquis for stroke prevention with Afib. Echo repeated, abnormal but still similar to previous echo in Feb 2021 with reduced EF, posterior pericardial effusion and pulm HTN - which seems improved. Stable for d/c home to follow up with her Cardiologist.    Since discharge:  The patient has been ok since discharge. Denies any palpitations or rapid heart rate.     Was started on eliquis and has  been taking her medication as prescribed. States that she has been taking her metoprolol as well.     Has not taken BP medication this morning. Came straight from dialysis; will take when she gets home. Her BP is usually within normal range at home.     Had to get PRBC in the hospital.  Last hemoglobin was 8.9. denies any more weakness.        PAST MEDICAL HISTORY:  Past Medical History:   Diagnosis Date    Anemia     Bronchitis 03/01/2017    Cancer 2016    kidney cancer    CHF (congestive heart failure), NYHA class II, chronic, systolic     CMV (cytomegalovirus) antibody positive     Essential hypertension 9/23/2015    H/O herpes simplex type 2 infection     Herpes simplex type 1 antibody positive     History of kidney cancer     s/p left nephrectomy 1/2016    Hyperparathyroidism, unspecified     Hyperuricemia without signs of inflammatory arthritis and tophaceous disease     Kidney stones     LGSIL (low grade squamous intraepithelial dysplasia)     Myocardiopathy 7/21/2017    Prediabetes     Proteinuria     Renal disorder     Thyroid nodule     Urate nephropathy        PAST SURGICAL HISTORY:  Past Surgical History:   Procedure Laterality Date    BREAST CYST EXCISION      COLONOSCOPY N/A 11/12/2015    COLONOSCOPY N/A 3/12/2021    Procedure: COLONOSCOPY;  Surgeon: Brendon Lanier MD;  Location: [facility];  Service: Endoscopy;  Laterality: N/A;  covid test 3/9, labs prior, prep instr mailed -ml    INSERTION OF BIVENTRICULAR IMPLANTABLE CARDIOVERTER-DEFIBRILLATOR (ICD)  04/2021    NEPHRECTOMY-LAPAROSCOPIC Left 01/12/2016    PERITONEAL CATHETER INSERTION      Permacath insertion  01/12/2017    SALPINGOOPHORECTOMY Right 2016    KJB---DAVINCI    TONSILLECTOMY      TUBAL LIGATION         SOCIAL HISTORY:  Social History     Socioeconomic History    Marital status:     Number of children: 2   Occupational History    Occupation: [employer]     Employer: [id number]   Tobacco Use     Smoking status: Never Smoker    Smokeless tobacco: Never Used   Substance and Sexual Activity    Alcohol use: No     Comment: . 2 children. works at [employer].    Drug use: No    Sexual activity: Yes     Partners: Male     Social Determinants of Health     Financial Resource Strain: Low Risk     Difficulty of Paying Living Expenses: Not hard at all   Food Insecurity: No Food Insecurity    Worried About Running Out of Food in the Last Year: Never true    Ran Out of Food in the Last Year: Never true   Transportation Needs: No Transportation Needs    Lack of Transportation (Medical): No    Lack of Transportation (Non-Medical): No   Physical Activity: Insufficiently Active    Days of Exercise per Week: 3 days    Minutes of Exercise per Session: 30 min   Stress: No Stress Concern Present    Feeling of Stress : Not at all   Social Connections: Socially Integrated    Frequency of Communication with Friends and Family: More than three times a week    Frequency of Social Gatherings with Friends and Family: Once a week    Attends Gnosticism Services: 1 to 4 times per year    Active Member of Clubs or Organizations: Yes    Attends Club or Organization Meetings: 1 to 4 times per year    Marital Status:    Housing Stability: Low Risk     Unable to Pay for Housing in the Last Year: No    Number of Places Lived in the Last Year: 1    Unstable Housing in the Last Year: No       FAMILY HISTORY:  Family History   Problem Relation Age of Onset    Hypertension Mother     Diabetes Father     Kidney disease Father     No Known Problems Son     No Known Problems Son     Diabetes Maternal Grandfather     No Known Problems Sister     No Known Problems Brother     No Known Problems Maternal Grandmother     No Known Problems Paternal Grandmother     No Known Problems Paternal Grandfather     Heart disease Sister     No Known Problems Sister     No Known Problems Brother     No Known Problems  Maternal Aunt     No Known Problems Maternal Uncle     No Known Problems Paternal Aunt     No Known Problems Paternal Uncle     Breast cancer Neg Hx     Colon cancer Neg Hx     Cancer Neg Hx     Stroke Neg Hx     Amblyopia Neg Hx     Blindness Neg Hx     Cataracts Neg Hx     Glaucoma Neg Hx     Macular degeneration Neg Hx     Retinal detachment Neg Hx     Strabismus Neg Hx     Thyroid disease Neg Hx        ALLERGIES AND MEDICATIONS: updated and reviewed.  Review of patient's allergies indicates:   Allergen Reactions    Coreg [carvedilol] Other (See Comments)     Nausea/vomiting    Allopurinol      Other reaction(s): abnormal transaminases     Current Outpatient Medications   Medication Sig Dispense Refill    acetaminophen (TYLENOL) 500 MG tablet Take 500 mg by mouth every 6 (six) hours as needed for Pain.      apixaban (ELIQUIS) 2.5 mg Tab Take 1 tablet (2.5 mg total) by mouth 2 (two) times daily. 60 tablet 4    cabozantinib (CABOMETYX) 60 mg Tab Take 60 mg by mouth once daily. 30 tablet 5    cloNIDine 0.3 mg/24 hr td ptwk (CATAPRES) 0.3 mg/24 hr Place 1 patch onto the skin every 7 days. 4 patch 11    isosorbide mononitrate (IMDUR) 30 MG 24 hr tablet Take 1 tablet (30 mg total) by mouth once daily. 30 tablet 11    lidocaine-prilocaine (EMLA) cream APPLY ATLEAST 30 MINUTES BEFORE TREATMENT 3 TIMES A WEEK 30 g 11    metoprolol succinate (TOPROL-XL) 100 MG 24 hr tablet Take 1 tablet (100 mg total) by mouth once daily. 90 tablet 0    mv,Ca,min-folic acid-vit K1 (ONE-A-DAY WOMEN'S 50 PLUS) 400-20 mcg Tab Take 1 tablet by mouth.      olmesartan (BENICAR) 40 MG tablet Take 1 tablet (40 mg total) by mouth once daily. 90 tablet 3    sevelamer carbonate (RENVELA) 800 mg Tab Take 2 tablets (1,600 mg total) by mouth 3 (three) times daily with meals. 540 tablet 0    epoetin david-epbx (RETACRIT INJ) Epoetin david - epbx (Retacrit)      sodium chloride 0.9% SolP 100 mL with iron sucrose 100 mg iron/5 mL  Soln 100 mg 50 mg.      sucroferric oxyhydroxide (VELPHORO) 500 mg Chew Take 1 tablet (500 mg total) by mouth 3 (three) times daily. (Patient not taking: No sig reported) 270 tablet 0     No current facility-administered medications for this visit.       Patient Care Team:  Sowmya Mccain MD as PCP - General (Internal Medicine)  Ben Rivera MD as Consulting Physician (Hematology and Oncology)  Leora Louis MD (Cardiology)  Williams Hart MD as Consulting Physician (Nephrology)  Lulu Le LPN as Licensed Practical Nurse    ROS  Review of Systems   Constitutional: Negative for chills, fatigue, fever and unexpected weight change.   HENT: Negative for congestion, ear discharge, ear pain and postnasal drip.    Eyes: Negative for photophobia, pain and visual disturbance.   Respiratory: Negative for cough, shortness of breath and wheezing.    Cardiovascular: Negative for chest pain, palpitations and leg swelling.   Gastrointestinal: Negative for abdominal pain, constipation, diarrhea, nausea and vomiting.   Genitourinary: Negative for dysuria, frequency, urgency and vaginal discharge.   Musculoskeletal: Negative for back pain, joint swelling and neck stiffness.   Skin: Negative for rash.   Neurological: Negative for weakness and headaches.   Psychiatric/Behavioral: Negative for dysphoric mood and sleep disturbance. The patient is not nervous/anxious.        Transitional Care Note    Family and/or Caretaker present at visit?  Yes.  Diagnostic tests reviewed/disposition: No diagnosic tests pending after this hospitalization.  Disease/illness education: AFIB with RVR  Home health/community services discussion/referrals: Patient does not have home health established from hospital visit.  They do not need home health.  If needed, we will set up home health for the patient.   Establishment or re-establishment of referral orders for community resources: No other necessary community resources.  "  Discussion with other health care providers: No discussion with other health care providers necessary.           Physical Exam  Vitals:    07/05/22 1001 07/05/22 1034   BP: (!) 146/76 (!) 140/86   BP Location:  Right arm   Patient Position:  Sitting   BP Method:  Medium (Manual)   Pulse: 80    Temp: 98.2 °F (36.8 °C)    TempSrc: Oral    SpO2: 97%    Weight: 55.4 kg (122 lb 3.9 oz)    Height: 5' 4" (1.626 m)     Body mass index is 20.98 kg/m².  Weight: 55.4 kg (122 lb 3.9 oz)   Height: 5' 4" (162.6 cm)     Physical Exam  Constitutional:       Appearance: She is well-developed.   HENT:      Head: Normocephalic and atraumatic.      Right Ear: External ear normal.      Left Ear: External ear normal.      Nose: Nose normal.   Eyes:      Conjunctiva/sclera: Conjunctivae normal.      Pupils: Pupils are equal, round, and reactive to light.   Neck:      Thyroid: No thyromegaly.   Cardiovascular:      Rate and Rhythm: Normal rate and regular rhythm.   Pulmonary:      Effort: Pulmonary effort is normal.      Breath sounds: Normal breath sounds. No wheezing.   Abdominal:      General: Bowel sounds are normal.      Palpations: Abdomen is soft. There is no hepatomegaly or splenomegaly.      Tenderness: There is no abdominal tenderness.   Genitourinary:     Vagina: Normal. No vaginal discharge.   Musculoskeletal:         General: Normal range of motion.      Cervical back: Normal range of motion and neck supple.   Lymphadenopathy:      Cervical: No cervical adenopathy.   Skin:     General: Skin is warm and dry.      Findings: No rash.   Neurological:      Mental Status: She is alert and oriented to person, place, and time.      Cranial Nerves: No cranial nerve deficit.   Psychiatric:         Behavior: Behavior normal.         Thought Content: Thought content normal.         Judgment: Judgment normal.             Health Maintenance       Date Due Completion Date    Hemoglobin A1c 08/26/2021 2/26/2021    Lipid Panel 02/26/2022 " 2/26/2021    COVID-19 Vaccine (4 - Booster for Moderna series) 03/11/2022 11/11/2021    Influenza Vaccine (1) 09/01/2022 9/30/2021    Mammogram 11/12/2022 11/12/2021    Cervical Cancer Screening 11/16/2023 11/16/2018    TETANUS VACCINE 09/23/2025 9/23/2015    Colorectal Cancer Screening 03/12/2028 3/12/2021    Pneumococcal Vaccines (Age 0-64) (4 - PPSV23 or PCV20) 10/01/2029 11/9/2016      Health maintenance reviewed at this time.     Assessment & Plan  Hospital discharge follow-up  The patient has been doing well since discharge. Denies any SOB, CP or palpitations. Has been taking her medication as prescribed eliquis and metoprolol.    Atrial fibrillation with RVR/Cardiomyopathy, nonischemic  New diagnosis. Will have the patient f/u with cardiology.  Will also f/u with the arrhythmia clinic.    ESRD (end stage renal disease) on dialysis/Anemia in ESRD (end-stage renal disease)  Anemia stable at this time  On dialysis. T, TH, S.    Essential hypertension  Elevated in clinic. The patient had dialysis prior to arrival and had not taken her medication. Patient has cardiology appointment in 2 days. Will have the patients BP rechecked then. BP is controlled at home.          Follow-up: Follow up in about 2 days (around 7/7/2022).

## 2022-07-07 ENCOUNTER — OFFICE VISIT (OUTPATIENT)
Dept: CARDIOLOGY | Facility: CLINIC | Age: 57
End: 2022-07-07
Payer: MEDICARE

## 2022-07-07 VITALS
BODY MASS INDEX: 20.85 KG/M2 | WEIGHT: 122.13 LBS | HEART RATE: 83 BPM | HEIGHT: 64 IN | DIASTOLIC BLOOD PRESSURE: 88 MMHG | SYSTOLIC BLOOD PRESSURE: 175 MMHG

## 2022-07-07 DIAGNOSIS — I12.0 HYPERTENSIVE CKD, ESRD ON DIALYSIS: ICD-10-CM

## 2022-07-07 DIAGNOSIS — Z90.5 S/P NEPHRECTOMY: Chronic | ICD-10-CM

## 2022-07-07 DIAGNOSIS — N18.6 HYPERTENSIVE CKD, ESRD ON DIALYSIS: ICD-10-CM

## 2022-07-07 DIAGNOSIS — Z99.2 ESRD (END STAGE RENAL DISEASE) ON DIALYSIS: ICD-10-CM

## 2022-07-07 DIAGNOSIS — Z99.2 HYPERTENSIVE CKD, ESRD ON DIALYSIS: ICD-10-CM

## 2022-07-07 DIAGNOSIS — N18.6 ESRD (END STAGE RENAL DISEASE) ON DIALYSIS: ICD-10-CM

## 2022-07-07 DIAGNOSIS — I27.20 PULMONARY HYPERTENSION: ICD-10-CM

## 2022-07-07 DIAGNOSIS — I34.0 NONRHEUMATIC MITRAL VALVE REGURGITATION: ICD-10-CM

## 2022-07-07 DIAGNOSIS — Z95.810 ICD (IMPLANTABLE CARDIOVERTER-DEFIBRILLATOR) IN PLACE: ICD-10-CM

## 2022-07-07 DIAGNOSIS — D63.1 ANEMIA IN ESRD (END-STAGE RENAL DISEASE): ICD-10-CM

## 2022-07-07 DIAGNOSIS — I51.89 COMBINED SYSTOLIC AND DIASTOLIC CARDIAC DYSFUNCTION: Primary | ICD-10-CM

## 2022-07-07 DIAGNOSIS — N18.6 ESRD ON DIALYSIS: ICD-10-CM

## 2022-07-07 DIAGNOSIS — I10 ESSENTIAL HYPERTENSION: ICD-10-CM

## 2022-07-07 DIAGNOSIS — I42.8 CARDIOMYOPATHY, NONISCHEMIC: ICD-10-CM

## 2022-07-07 DIAGNOSIS — I50.22 CHF (CONGESTIVE HEART FAILURE), NYHA CLASS II, CHRONIC, SYSTOLIC: ICD-10-CM

## 2022-07-07 DIAGNOSIS — Z99.2 ESRD ON DIALYSIS: ICD-10-CM

## 2022-07-07 DIAGNOSIS — N18.6 ANEMIA IN ESRD (END-STAGE RENAL DISEASE): ICD-10-CM

## 2022-07-07 PROCEDURE — 99999 PR PBB SHADOW E&M-EST. PATIENT-LVL III: ICD-10-PCS | Mod: PBBFAC,,, | Performed by: INTERNAL MEDICINE

## 2022-07-07 PROCEDURE — 99214 PR OFFICE/OUTPT VISIT, EST, LEVL IV, 30-39 MIN: ICD-10-PCS | Mod: S$PBB,,, | Performed by: INTERNAL MEDICINE

## 2022-07-07 PROCEDURE — 99999 PR PBB SHADOW E&M-EST. PATIENT-LVL III: CPT | Mod: PBBFAC,,, | Performed by: INTERNAL MEDICINE

## 2022-07-07 PROCEDURE — 99213 OFFICE O/P EST LOW 20 MIN: CPT | Mod: PBBFAC | Performed by: INTERNAL MEDICINE

## 2022-07-07 PROCEDURE — 99214 OFFICE O/P EST MOD 30 MIN: CPT | Mod: S$PBB,,, | Performed by: INTERNAL MEDICINE

## 2022-07-07 RX ORDER — LISINOPRIL 20 MG/1
20 TABLET ORAL DAILY
Qty: 90 TABLET | Refills: 3 | Status: SHIPPED | OUTPATIENT
Start: 2022-07-07 | End: 2022-09-14 | Stop reason: ALTCHOICE

## 2022-07-07 NOTE — PROGRESS NOTES
Subjective:   Patient ID:  Eva Bloom is a 56 y.o. female who presents for follow-up of Atrial fibrillation with RVR (Hospital f/u 6/28/22)  Eva Bloom is a 56 y.o. female who presents for follow-up of Fatigue and Cardiomyopathy (4 month f/u )     Nonischemic cardiomyopathy   HFrEF s/p SC-ICD  Chronic pericardial effusion   Hypertension  Pulmonary hypertension  Renal cell carcinoma  ESRD on HD     Interval History  Presents with CP x 3-4 days. States it is a light pain and that comes and goes randomly and lasts a few seconds at a time. Nonexertional. She also reports LUE swelling. She has AVF in LUE. Also mild LE edema which is chronic. She denies SOB, syncope, near syncope, sustained palpitations. She had a nosebleed recently. Packing was removed about a week ago, no further bleeding. BP is typically 130s/70s, sometimes higher, at home. Of note, she was switched from Olmesartan to Entresto following her last visit. The Entresto caused diarrhea so she stopped it.      1/2021 HPI (Sowmya Stock)  Ms. Bloom is in clinic today for routine follow up.  At her last visit her, her metoprolol was increased to 100 mg daily and imdur was changed to isordil 30 mg BID to match components of bydil.  Denies hypotension during HD.  Her BP is running 130-140s systolic.  Patient denies chest pain with exertion or at rest, palpitations, SOB, WARREN, dizziness, syncope, edema, orthopnea, PND, or claudication.  Reports no routine exercise due to fatigue.        Echo 2/21  · Mild concentric hypertrophy and moderately decreased systolic function. The estimated ejection fraction is 30%  · Grade II left ventricular diastolic dysfunction.  · Moderate mitral regurgitation.  · Mild right ventricular enlargement with normal right ventricular systolic function.  · Moderate to severe tricuspid regurgitation.  · Mild aortic regurgitation.  · Severe left atrial enlargement.  · Moderate right atrial enlargement.  · Intermediate central  venous pressure (8 mmHg).  · The estimated PA systolic pressure is 72 mmHg.  · There is pulmonary hypertension.  · Moderate posterior pericardial effusion.     PET 2017  CONCLUSIONS: NORMAL MYOCARDIAL PERFUSION PET STRESS TEST   1. The perfusion scan is free of evidence for myocardial ischemia or injury.   2. There is abnormal wall motion at rest showing mild global hypokinesis of the left ventricle. There is abnormal wall motion at stress showing mild global hypokinesis of the left ventricle.   3. There is resting LV dysfunction with a reduced ejection fraction of 41 %. There is stress induced LV dysfunction with a reduced ejection fraction of 48 %.  (normal is >= 51%)   4. The ventricular volumes are normal at rest and stress.   5. The extracardiac distribution of radioactivity is normal.   6. There was no previous study available to compare.      HPI:   patent feels weak with limited activity  No chest pain, Orthopnea, PND of heart failure symptoms.   Denies palpitations or fluttering in the chest  BP  Has been consistently elevated in the clinic but her pressures have been better at home and in the dialysis clinic per her.      HPI:   patent feels weak with limited activity  No chest pain, Orthopnea, PND of heart failure symptoms.   Denies palpitations or fluttering in the chest  BP  Has been consistently elevated in the clinic but her pressures have been better at home and in the dialysis clinic per her.   Patient is sedentary and walks some.         Patient Active Problem List   Diagnosis    Kidney stones    Hyperparathyroidism, secondary renal    Hyperuricemia without signs of inflammatory arthritis and tophaceous disease    Proteinuria    Urate nephropathy    Essential hypertension    Pap smear abnormality of cervix with ASCUS favoring benign    CMV (cytomegalovirus) antibody positive    Herpes simplex type 1 antibody positive    H/O herpes simplex type 2 infection    Multinodular goiter     "History of kidney cancer    S/p nephrectomy left    Primary insomnia    Cardiomyopathy, nonischemic    Combined systolic and diastolic cardiac dysfunction    Pulmonary hypertension    ESRD (end stage renal disease) on dialysis    Nonrheumatic mitral valve regurgitation    CHF (congestive heart failure), NYHA class II, chronic, systolic    Hyperkalemia    Anemia in ESRD (end-stage renal disease)    Chronic kidney disease-mineral and bone disorder    Hyperphosphatemia    ICD (implantable cardioverter-defibrillator) in place - SubQ    Right-sided epistaxis    Left-sided epistaxis    Hypercalcemia    Glucose intolerance    Thrombocytopenia    Atrial fibrillation with RVR     BP (!) 175/88 (BP Location: Right arm, Patient Position: Sitting, BP Method: Medium (Automatic))   Pulse 83   Ht 5' 4" (1.626 m)   Wt 55.4 kg (122 lb 2.2 oz)   LMP 10/24/2016   BMI 20.96 kg/m²   Body mass index is 20.96 kg/m².  CrCl cannot be calculated (Patient's most recent lab result is older than the maximum 7 days allowed.).    Lab Results   Component Value Date     06/28/2022    K 3.9 06/28/2022    CL 98 06/28/2022    CO2 23 06/28/2022    BUN 28 (H) 06/28/2022    CREATININE 4.1 (H) 06/28/2022    GLU 83 06/28/2022    HGBA1C 5.2 02/26/2021    MG 2.4 03/16/2020    AST 65 (H) 06/28/2022    ALT 35 06/28/2022    ALBUMIN 3.3 (L) 06/28/2022    PROT 7.6 06/28/2022    BILITOT 1.4 (H) 06/28/2022    WBC 7.43 06/29/2022    HGB 8.9 (L) 06/29/2022    HCT 27.9 (L) 06/29/2022     (H) 06/29/2022     06/29/2022    INR 1.1 06/09/2022    TSH 7.350 (H) 09/13/2021    CHOL 99 (L) 02/26/2021    HDL 34 (L) 02/26/2021    LDLCALC 54.4 (L) 02/26/2021    TRIG 53 02/26/2021       Current Outpatient Medications   Medication Sig    acetaminophen (TYLENOL) 500 MG tablet Take 500 mg by mouth every 6 (six) hours as needed for Pain.    apixaban (ELIQUIS) 2.5 mg Tab Take 1 tablet (2.5 mg total) by mouth 2 (two) times daily.    " cabozantinib (CABOMETYX) 60 mg Tab Take 60 mg by mouth once daily.    cloNIDine 0.3 mg/24 hr td ptwk (CATAPRES) 0.3 mg/24 hr Place 1 patch onto the skin every 7 days.    epoetin david-epbx (RETACRIT INJ) Epoetin david - epbx (Retacrit)    lidocaine-prilocaine (EMLA) cream APPLY ATLEAST 30 MINUTES BEFORE TREATMENT 3 TIMES A WEEK    metoprolol succinate (TOPROL-XL) 100 MG 24 hr tablet Take 1 tablet (100 mg total) by mouth once daily.    mv,Ca,min-folic acid-vit K1 (ONE-A-DAY WOMEN'S 50 PLUS) 400-20 mcg Tab Take 1 tablet by mouth.    sevelamer carbonate (RENVELA) 800 mg Tab Take 2 tablets (1,600 mg total) by mouth 3 (three) times daily with meals.    sodium chloride 0.9% SolP 100 mL with iron sucrose 100 mg iron/5 mL Soln 100 mg 50 mg.    sucroferric oxyhydroxide (VELPHORO) 500 mg Chew Take 1 tablet (500 mg total) by mouth 3 (three) times daily.    lisinopriL (PRINIVIL,ZESTRIL) 20 MG tablet Take 1 tablet (20 mg total) by mouth once daily.     No current facility-administered medications for this visit.       Review of Systems   Constitutional: Positive for malaise/fatigue. Negative for chills, decreased appetite, night sweats, weight gain and weight loss.   Eyes: Negative for blurred vision, double vision, visual disturbance and visual halos.   Cardiovascular: Positive for dyspnea on exertion. Negative for chest pain, claudication, cyanosis, irregular heartbeat, leg swelling, near-syncope, orthopnea, palpitations, paroxysmal nocturnal dyspnea and syncope.   Respiratory: Negative for cough, hemoptysis, snoring, sputum production and wheezing.    Endocrine: Negative for cold intolerance, heat intolerance, polydipsia and polyphagia.   Hematologic/Lymphatic: Negative for adenopathy and bleeding problem. Does not bruise/bleed easily.   Skin: Negative for flushing, itching, poor wound healing and rash.   Musculoskeletal: Negative for arthritis, back pain, falls, gout, joint pain, joint swelling, muscle cramps, muscle  weakness, myalgias, neck pain and stiffness.   Gastrointestinal: Negative for bloating, abdominal pain, anorexia, diarrhea, dysphagia, excessive appetite, flatus, hematemesis, jaundice, melena and nausea.   Genitourinary: Negative for hesitancy and incomplete emptying.   Neurological: Negative for aphonia, brief paralysis, difficulty with concentration, disturbances in coordination, excessive daytime sleepiness, dizziness, focal weakness, light-headedness, loss of balance and weakness.   Psychiatric/Behavioral: Negative for altered mental status, depression, hallucinations, hypervigilance, memory loss, substance abuse and suicidal ideas. The patient does not have insomnia and is not nervous/anxious.        Objective:   Physical Exam  Vitals reviewed: patient appears weak.   Constitutional:       General: She is not in acute distress.     Appearance: She is well-developed. She is not diaphoretic.   HENT:      Head: Normocephalic and atraumatic.      Nose: Nose normal.      Mouth/Throat:      Pharynx: No oropharyngeal exudate.   Eyes:      General: No scleral icterus.        Right eye: No discharge.         Left eye: No discharge.      Conjunctiva/sclera: Conjunctivae normal.      Pupils: Pupils are equal, round, and reactive to light.   Neck:      Thyroid: No thyromegaly.      Vascular: No JVD.      Trachea: No tracheal deviation.   Cardiovascular:      Rate and Rhythm: Normal rate and regular rhythm.      Pulses: Intact distal pulses.      Heart sounds: Normal heart sounds. No murmur heard.    No friction rub. No gallop.   Pulmonary:      Effort: Pulmonary effort is normal. No respiratory distress.      Breath sounds: Normal breath sounds. No stridor. No wheezing or rales.   Chest:      Chest wall: No tenderness.   Abdominal:      General: Bowel sounds are normal. There is no distension.      Palpations: Abdomen is soft. There is no mass.      Tenderness: There is no abdominal tenderness. There is no guarding or  rebound.   Musculoskeletal:         General: No tenderness. Normal range of motion.      Cervical back: Normal range of motion and neck supple.   Lymphadenopathy:      Cervical: No cervical adenopathy.   Skin:     General: Skin is warm.      Coloration: Skin is not pale.      Findings: No erythema or rash.   Neurological:      Mental Status: She is alert and oriented to person, place, and time.      Cranial Nerves: No cranial nerve deficit.      Motor: No abnormal muscle tone.      Coordination: Coordination normal.      Deep Tendon Reflexes: Reflexes are normal and symmetric.   Psychiatric:         Behavior: Behavior normal.         Thought Content: Thought content normal.         Judgment: Judgment normal.         Assessment:     1. Combined systolic and diastolic cardiac dysfunction    2. Hypertensive CKD, ESRD on dialysis    3. ESRD on dialysis    4. CHF (congestive heart failure), NYHA class II, chronic, systolic    5. Cardiomyopathy, nonischemic    6. Pulmonary hypertension    7. Nonrheumatic mitral valve regurgitation    8. ICD (implantable cardioverter-defibrillator) in place - SubQ    9. Essential hypertension    10. ESRD (end stage renal disease) on dialysis    11. S/p nephrectomy left    12. Anemia in ESRD (end-stage renal disease)        Plan:     Eva was seen today for atrial fibrillation with rvr.    Diagnoses and all orders for this visit:    Combined systolic and diastolic cardiac dysfunction    Hypertensive CKD, ESRD on dialysis    ESRD on dialysis    CHF (congestive heart failure), NYHA class II, chronic, systolic    Cardiomyopathy, nonischemic    Pulmonary hypertension    Nonrheumatic mitral valve regurgitation    ICD (implantable cardioverter-defibrillator) in place - SubQ    Essential hypertension    ESRD (end stage renal disease) on dialysis    S/p nephrectomy left    Anemia in ESRD (end-stage renal disease)    Other orders  -     lisinopriL (PRINIVIL,ZESTRIL) 20 MG tablet; Take 1 tablet (20 mg  total) by mouth once daily.      Stopped Imdur and started lisinopril for LV dysfunction .Goal is gradual up titration of losartan. Data on Entresto unclear in ESRD patients.   Limit sodium intake to less then 2 gram sodium and 1500cc fluid restriction.  Graded exercise program as tolerated.  Call if  more than 3 lbs in 1 day or 5 lbs in 1 week.    RTC 10 months,.

## 2022-07-07 NOTE — PHYSICIAN QUERY
PT Name: Eva Bloom  MR #: [id number]    DOCUMENTATION CLARIFICATION     CDS/: Barbara Damon RN, CCDS              Contact information: [email or web address]     This form is a permanent document in the medical record.     Query Date: July 7, 2022    By submitting this query, we are merely seeking further clarification of documentation. Please utilize your independent clinical judgment when addressing the question(s) below.    The Medical Record contains the following:   Indicators Supporting Clinical Findings Location in Medical Record   X Atrial Fibrillation   Patient found to be in AFib with RVR    Atrial fibrillation with RVR  Atrial fibrillation is a new diagnosis for her  Converted to sinus with metoprolol in ED.   6/28 ed md note    6/28 h/p     X EKG Results Atrial fibrillation with rapid ventricular response    6/28  ekg   X Medication  Given multiple rounds of IV Lopressor with rate control, given her oral metoprolol 6/28 ed md note    Treatment      Other       The clinical guidelines noted are only a system guideline. It does not replace the provider's clinical judgment.    Provider, please specify the type of Atrial Fibrillation (AF):    [ x ] Paroxysmal - defined as AF that terminates spontaneously or with intervention within < 7 days of onset, episodes may recur with variable frequency   [  ] Unspecified Atrial Fibrillation   [  ] Other cardiac diagnosis (please specify): ____________   [  ] Clinically Undetermined         Please document in your progress notes daily for the duration of treatment until resolved, and include in your discharge summary.    Reference:  MARINA Bolton MD. (2020, September 14). Overview of atrial fibrillation (SAPPHIRE Doty MD & DARRYL Pena MD, Eds.). Retrieved October 22, 2020, from [email or web address]=search_[email or web address]=1~150&usage_type=default&display_rank=1    Form No.  [id number]

## 2022-07-08 ENCOUNTER — TELEPHONE (OUTPATIENT)
Dept: ADMINISTRATIVE | Facility: OTHER | Age: 57
End: 2022-07-08
Payer: MEDICARE

## 2022-07-08 ENCOUNTER — PATIENT OUTREACH (OUTPATIENT)
Dept: ADMINISTRATIVE | Facility: OTHER | Age: 57
End: 2022-07-08
Payer: MEDICARE

## 2022-07-08 NOTE — PROGRESS NOTES
CHW - Unable to Contact    Community Health Worker to close episode at this time due to three missed attempts for Eva Bloom contact.

## 2022-07-22 ENCOUNTER — TELEPHONE (OUTPATIENT)
Dept: ELECTROPHYSIOLOGY | Facility: CLINIC | Age: 57
End: 2022-07-22
Payer: MEDICARE

## 2022-07-25 ENCOUNTER — OFFICE VISIT (OUTPATIENT)
Dept: ELECTROPHYSIOLOGY | Facility: CLINIC | Age: 57
End: 2022-07-25
Payer: MEDICARE

## 2022-07-25 ENCOUNTER — CLINICAL SUPPORT (OUTPATIENT)
Dept: CARDIOLOGY | Facility: HOSPITAL | Age: 57
End: 2022-07-25
Attending: INTERNAL MEDICINE
Payer: MEDICARE

## 2022-07-25 ENCOUNTER — PATIENT MESSAGE (OUTPATIENT)
Dept: CARDIOLOGY | Facility: CLINIC | Age: 57
End: 2022-07-25
Payer: MEDICARE

## 2022-07-25 ENCOUNTER — ANTI-COAG VISIT (OUTPATIENT)
Dept: CARDIOLOGY | Facility: CLINIC | Age: 57
End: 2022-07-25
Payer: MEDICARE

## 2022-07-25 VITALS
HEIGHT: 64 IN | DIASTOLIC BLOOD PRESSURE: 75 MMHG | BODY MASS INDEX: 20.78 KG/M2 | HEART RATE: 81 BPM | SYSTOLIC BLOOD PRESSURE: 158 MMHG | WEIGHT: 121.69 LBS

## 2022-07-25 DIAGNOSIS — Z99.2 ESRD (END STAGE RENAL DISEASE) ON DIALYSIS: ICD-10-CM

## 2022-07-25 DIAGNOSIS — I48.0 PAF (PAROXYSMAL ATRIAL FIBRILLATION): ICD-10-CM

## 2022-07-25 DIAGNOSIS — I42.8 CARDIOMYOPATHY, NONISCHEMIC: ICD-10-CM

## 2022-07-25 DIAGNOSIS — I10 ESSENTIAL HYPERTENSION: ICD-10-CM

## 2022-07-25 DIAGNOSIS — Z79.01 WARFARIN ANTICOAGULATION: Primary | ICD-10-CM

## 2022-07-25 DIAGNOSIS — Z95.810 AUTOMATIC IMPLANTABLE CARDIAC DEFIBRILLATOR IN SITU: ICD-10-CM

## 2022-07-25 DIAGNOSIS — N18.6 ESRD (END STAGE RENAL DISEASE) ON DIALYSIS: ICD-10-CM

## 2022-07-25 DIAGNOSIS — Z95.810 ICD (IMPLANTABLE CARDIOVERTER-DEFIBRILLATOR) IN PLACE: Primary | ICD-10-CM

## 2022-07-25 PROBLEM — I48.91 ATRIAL FIBRILLATION WITH RVR: Status: RESOLVED | Noted: 2022-06-28 | Resolved: 2022-07-25

## 2022-07-25 PROCEDURE — 99999 PR PBB SHADOW E&M-EST. PATIENT-LVL IV: CPT | Mod: PBBFAC,,, | Performed by: NURSE PRACTITIONER

## 2022-07-25 PROCEDURE — 99214 PR OFFICE/OUTPT VISIT, EST, LEVL IV, 30-39 MIN: ICD-10-PCS | Mod: S$PBB,,, | Performed by: NURSE PRACTITIONER

## 2022-07-25 PROCEDURE — 99214 OFFICE O/P EST MOD 30 MIN: CPT | Mod: S$PBB,,, | Performed by: NURSE PRACTITIONER

## 2022-07-25 PROCEDURE — 93010 RHYTHM STRIP: ICD-10-PCS | Mod: S$PBB,,, | Performed by: INTERNAL MEDICINE

## 2022-07-25 PROCEDURE — 93260 PRGRMG DEV EVAL IMPLTBL SYS: CPT

## 2022-07-25 PROCEDURE — 99214 OFFICE O/P EST MOD 30 MIN: CPT | Mod: PBBFAC | Performed by: NURSE PRACTITIONER

## 2022-07-25 PROCEDURE — 93005 ELECTROCARDIOGRAM TRACING: CPT | Mod: PBBFAC | Performed by: INTERNAL MEDICINE

## 2022-07-25 PROCEDURE — 93260 PRGRMG DEV EVAL IMPLTBL SYS: CPT | Mod: 26,,, | Performed by: INTERNAL MEDICINE

## 2022-07-25 PROCEDURE — 93010 ELECTROCARDIOGRAM REPORT: CPT | Mod: S$PBB,,, | Performed by: INTERNAL MEDICINE

## 2022-07-25 PROCEDURE — 93260 CARDIAC DEVICE CHECK - IN CLINIC & HOSPITAL: ICD-10-PCS | Mod: 26,,, | Performed by: INTERNAL MEDICINE

## 2022-07-25 PROCEDURE — 99999 PR PBB SHADOW E&M-EST. PATIENT-LVL IV: ICD-10-PCS | Mod: PBBFAC,,, | Performed by: NURSE PRACTITIONER

## 2022-07-25 RX ORDER — WARFARIN SODIUM 5 MG/1
5 TABLET ORAL DAILY
Qty: 30 TABLET | Refills: 11 | Status: SHIPPED | OUTPATIENT
Start: 2022-07-25 | End: 2022-09-14 | Stop reason: ALTCHOICE

## 2022-07-25 NOTE — PROGRESS NOTES
Spoke to patient regarding enrollment into the Coumadin Clinic. The patient could not verify tablet strength at this time due to the prescription not being ready for .  The patient was advised on a medication regimen of 5mg of warfarin daily. Patient expressed understanding.  INR scheduled for 7/28/22, at [facility], confirmed with patient.    Patient educated on diet and when to call. All questions and concerns addressed, and patient expressed understanding.  Education sheets sent via patient portal to reinforce teaching.    Patient will avoid greens and ETOH, and drink Boost 2x/week.  The importance of a consistent diet discussed with patient.

## 2022-07-25 NOTE — PROGRESS NOTES
56 year old female being started on OAC for stroke prevention in the setting of new onset Afib (CHADS-VASc of 3). Appears she was previously prescribed Eliquis - but unclear if she was taking, will clarify. PMHx includes NICM, CHF, HTN, PH, renal cell CA with ESRD on HD, ICD implant, thrombocytopenia, epistaxis requiring ablation.    Patient states she was not taking Eliquis.

## 2022-07-25 NOTE — Clinical Note
Hi Dr. Witt, I saw this pt today and wanted to let you know she is not taking her lisinopril secondary to dizziness. Will defer any med changes to you. Thanks, Leila

## 2022-07-25 NOTE — PROGRESS NOTES
Ms. Bloom is a patient of Dr. Chisholm and was last seen in clinic 7/20/2021.      Subjective:   Patient ID:  Eva Bloom is a 56 y.o. female who presents for follow-up of S-ICD and Atrial Fibrillation  .     HPI:    Ms. Bloom is a 56 y.o. female with NICM, HTN, PH, renal cell CA with ESRD on HD, S-ICD (4/2021) here for annual follow up.    Background:    NICM  HTN on meds  PH  renal cell CA, with subsequent ESRD after nephrectomy. Gets HD via LUE T/R/Sat  WARREN with stairs. No CP, no syncope.  echo 2/21 30%. mod PC effusion (chronic), 2+ MR  PET 2017 neg  NICM  Chronic PC effusion, likely related to ESRD  Will pursue S-ICD, which is a better option for her given young age, ESRD, and lack of pacing indication.  Will likely not perform DFT test. Discussed with pt, who understands and agrees.    4/19/2021: Successful implantation of ICD Sub Q placed for primary intervention.  7/20/2021: She is 3 mo s/p S-ICD implantation. Stable from a device perspective with stable lead and device function. No arrhythmia noted. Home monitor connected per pt.    Update (07/25/2022):    6/29/2022: Mrs. Bloom is a 57 yo F with extensive PMHx influding HFrEF with AICD, ESRD on HD, renal cancer, and HTN who presents to the ED for evaluation of weakness and palpitations. Her  explains the patient has been having ongoing nosebleeds and has lot a lot of blood from this over the last few weeks. Recently seen by ENT last week and has had no further bleeding. Since this past weekend, patient has been more weak and fatigued. This morning, she went to HD and had palpitations and felt like her heart was racing. She was told her heart rate was fast at that time. She came to the ED afterwards to be evaluated.   In the ED, she was found to be in atrial fibrillation with RVR. This is a new diagnosis for her. She was given IV lopressor and her home oral metoprolol. She eventually converted to normal sinus rhythm.  Admitted with atrial  fibrillation with RVR and weakness. Given Metoprolol and converted to NSR. After further history, patient has hx of recent blood loss via nose bleeding. Hb 7.6 --> given 1 unit pRBC and improvement to 8.9. Patient feeling much better and with more energy. Discussed start Eliquis for stroke prevention with Afib. Echo repeated, abnormal but still similar to previous echo in Feb 2021 with reduced EF, posterior pericardial effusion and pulm HTN - which seems improved.     Today she says that she stopped her eliquis due to severe diarrhea that lasted the 2 weeks she took the medicine and resolved after stopping. She has not had nosebleeds since ENT cauterization procedure (6/22/2022). No recent palpitations, CP, WARREN, syncope. Had LH on lisinopril and is not taking this either.    Device Interrogation (7/25/2022) reveals an intrinsic SR with stable lead and device function. AFx4 identified No ventricular arrhythmias or treated episodes were noted.  Estimated battery longevity 88%.     I have personally reviewed the patient's EKG today, which shows sinus rhythm at 81bpm. NJ interval is 172. QRS is 96. QTc is 494.    Relevant Cardiac Test Results:    2D Echo (6/29/2022):  · The left ventricle is normal in size with mild concentric hypertrophy and moderately decreased systolic function.  · Moderate posterior pericardial effusion. - STABLE  · Moderate right atrial enlargement.  · Mild tricuspid regurgitation.  · Severe left atrial enlargement.  · Mild pulmonic regurgitation.  · The estimated ejection fraction is 35%.  · Grade II left ventricular diastolic dysfunction.  · Moderate right ventricular enlargement with mildly reduced right ventricular systolic function.  · Mild mitral regurgitation.  · Normal central venous pressure (3 mmHg).  · The estimated PA systolic pressure is 47 mmHg.  · There is pulmonary hypertension.  · Mild aortic regurgitation.    Current Outpatient Medications   Medication Sig    acetaminophen  "(TYLENOL) 500 MG tablet Take 500 mg by mouth every 6 (six) hours as needed for Pain.    apixaban (ELIQUIS) 2.5 mg Tab Take 1 tablet (2.5 mg total) by mouth 2 (two) times daily.    cabozantinib (CABOMETYX) 60 mg Tab Take 60 mg by mouth once daily.    cloNIDine 0.3 mg/24 hr td ptwk (CATAPRES) 0.3 mg/24 hr Place 1 patch onto the skin every 7 days.    epoetin david-epbx (RETACRIT INJ) Epoetin david - epbx (Retacrit)    lidocaine-prilocaine (EMLA) cream APPLY ATLEAST 30 MINUTES BEFORE TREATMENT 3 TIMES A WEEK    lisinopriL (PRINIVIL,ZESTRIL) 20 MG tablet Take 1 tablet (20 mg total) by mouth once daily.    metoprolol succinate (TOPROL-XL) 100 MG 24 hr tablet Take 1 tablet (100 mg total) by mouth once daily.    mv,Ca,min-folic acid-vit K1 (ONE-A-DAY WOMEN'S 50 PLUS) 400-20 mcg Tab Take 1 tablet by mouth.    sevelamer carbonate (RENVELA) 800 mg Tab Take 2 tablets (1,600 mg total) by mouth 3 (three) times daily with meals.    sodium chloride 0.9% SolP 100 mL with iron sucrose 100 mg iron/5 mL Soln 100 mg 50 mg.    sucroferric oxyhydroxide (VELPHORO) 500 mg Chew Take 1 tablet (500 mg total) by mouth 3 (three) times daily.     No current facility-administered medications for this visit.       Review of Systems   Constitutional: Negative for malaise/fatigue.   Cardiovascular: Negative for chest pain, dyspnea on exertion, irregular heartbeat, leg swelling and palpitations.   Respiratory: Negative for shortness of breath.    Hematologic/Lymphatic: Negative for bleeding problem.   Skin: Negative for rash.   Musculoskeletal: Negative for myalgias.   Gastrointestinal: Negative for hematemesis, hematochezia and nausea.   Genitourinary: Negative for hematuria.   Neurological: Negative for light-headedness.   Psychiatric/Behavioral: Negative for altered mental status.   Allergic/Immunologic: Negative for persistent infections.       Objective:          BP (!) 158/75   Pulse 81   Ht 5' 4" (1.626 m)   Wt 55.2 kg (121 lb 11.1 " oz)   LMP 10/24/2016   BMI 20.89 kg/m²     Physical Exam  Vitals and nursing note reviewed.   Constitutional:       Appearance: Normal appearance. She is well-developed.   HENT:      Head: Normocephalic.      Nose: Nose normal.   Eyes:      Pupils: Pupils are equal, round, and reactive to light.   Cardiovascular:      Rate and Rhythm: Normal rate and regular rhythm.   Pulmonary:      Effort: No respiratory distress.      Breath sounds: Normal breath sounds.   Musculoskeletal:         General: Normal range of motion.   Skin:     General: Skin is warm and dry.      Findings: No erythema.   Neurological:      Mental Status: She is alert and oriented to person, place, and time.   Psychiatric:         Speech: Speech normal.         Behavior: Behavior normal.           Lab Results   Component Value Date     06/28/2022    K 3.9 06/28/2022    MG 2.4 03/16/2020    BUN 28 (H) 06/28/2022    CREATININE 4.1 (H) 06/28/2022    ALT 35 06/28/2022    AST 65 (H) 06/28/2022    HGB 8.9 (L) 06/29/2022    HCT 27.9 (L) 06/29/2022    TSH 7.350 (H) 09/13/2021    LDLCALC 54.4 (L) 02/26/2021       Recent Labs   Lab 03/14/20  0437 04/09/21  0720 04/16/21  0800 06/09/22  1104   INR 1.1 1.2 1.0 1.1         Assessment:     1. ICD (implantable cardioverter-defibrillator) in place - SubQ    2. ESRD (end stage renal disease) on dialysis    3. Essential hypertension    4. Cardiomyopathy, nonischemic    5. PAF (paroxysmal atrial fibrillation)      Plan:     In summary, Ms. Bloom is a 56 y.o. female with NICM, HTN, PH, renal cell CA with ESRD on HD, S-ICD (4/2021) here for annual follow up.  Device function stable. She is 1 mo s/p new onset symptomatic AF which converted back to SR after metoprolol. She was also found to be anemic after epistaxis which has resolved s/p cauterization. Did received 1 PRBC in hospital. She was started on eliquis but was unable to tolerate it due to side effects (severe diarrhea which resolved after cessation of  medication). I discussed atrial fibrillation and its basic pathophysiology, including the health implications and treatment options with Eva Bloom. Specifically, I addressed the need for CVA prophylaxis as well as the goal to reduce symptomatic arrhythmic episodes by pharmacologic and/or procedural methods. We discussed options for anticoagulation, including coumadin vs dabigatran/rivaroxaban/apixaban. Discussed risks and benefits of each, including dosing, side effects, risk, and cost. Answered all questions. Patient verbalized understanding.  CHADSVASc 5 and OAC is recommended. Will start warfarin given ESRD. Pt also given information to troubleshoot home monitoring. For now will have patient RTC in 3 mo to determine whether any rhythm control efforts are required.      Coumadin clinic referral  Reestablish home monitoring  RTC 3 mo, sooner if needed    *A copy of this note has been sent to Dr. Chisholm*    Follow up in about 3 months (around 10/25/2022).    ------------------------------------------------------------------    ROBERT Aguilar, NP-C  Cardiac Electrophysiology

## 2022-07-28 ENCOUNTER — ANTI-COAG VISIT (OUTPATIENT)
Dept: CARDIOLOGY | Facility: CLINIC | Age: 57
End: 2022-07-28
Payer: MEDICARE

## 2022-07-28 ENCOUNTER — LAB VISIT (OUTPATIENT)
Dept: LAB | Facility: HOSPITAL | Age: 57
End: 2022-07-28
Attending: INTERNAL MEDICINE
Payer: MEDICARE

## 2022-07-28 DIAGNOSIS — I48.0 PAF (PAROXYSMAL ATRIAL FIBRILLATION): ICD-10-CM

## 2022-07-28 DIAGNOSIS — I48.0 PAF (PAROXYSMAL ATRIAL FIBRILLATION): Primary | ICD-10-CM

## 2022-07-28 DIAGNOSIS — Z79.01 WARFARIN ANTICOAGULATION: ICD-10-CM

## 2022-07-28 LAB
INR PPP: 1.3 (ref 0.8–1.2)
PROTHROMBIN TIME: 13.9 SEC (ref 9–12.5)

## 2022-07-28 PROCEDURE — 93793 PR ANTICOAGULANT MGMT FOR PT TAKING WARFARIN: ICD-10-PCS | Mod: ,,,

## 2022-07-28 PROCEDURE — 85610 PROTHROMBIN TIME: CPT | Performed by: INTERNAL MEDICINE

## 2022-07-28 PROCEDURE — 36415 COLL VENOUS BLD VENIPUNCTURE: CPT | Mod: PO | Performed by: INTERNAL MEDICINE

## 2022-07-28 PROCEDURE — 93793 ANTICOAG MGMT PT WARFARIN: CPT | Mod: ,,,

## 2022-07-28 NOTE — PROGRESS NOTES
INR not at goal. Medications, chart, and patient findings reviewed. See calendar for adjustments to dose and follow up plan.  See findings.  [facility] states rx is ready for .  Ms. Bloom is to contact [facility] in the future if she has any issues/delays w/ obtaining medication.   Will dose per calendar & re-assess Monday.

## 2022-08-01 ENCOUNTER — LAB VISIT (OUTPATIENT)
Dept: LAB | Facility: HOSPITAL | Age: 57
End: 2022-08-01
Attending: NURSE PRACTITIONER
Payer: MEDICARE

## 2022-08-01 ENCOUNTER — OFFICE VISIT (OUTPATIENT)
Dept: FAMILY MEDICINE | Facility: CLINIC | Age: 57
End: 2022-08-01
Payer: MEDICARE

## 2022-08-01 ENCOUNTER — ANTI-COAG VISIT (OUTPATIENT)
Dept: CARDIOLOGY | Facility: CLINIC | Age: 57
End: 2022-08-01
Payer: MEDICARE

## 2022-08-01 VITALS
DIASTOLIC BLOOD PRESSURE: 72 MMHG | BODY MASS INDEX: 20.82 KG/M2 | HEART RATE: 83 BPM | OXYGEN SATURATION: 97 % | TEMPERATURE: 98 F | WEIGHT: 121.94 LBS | HEIGHT: 64 IN | SYSTOLIC BLOOD PRESSURE: 130 MMHG

## 2022-08-01 DIAGNOSIS — I48.0 PAF (PAROXYSMAL ATRIAL FIBRILLATION): ICD-10-CM

## 2022-08-01 DIAGNOSIS — N18.6 ESRD (END STAGE RENAL DISEASE) ON DIALYSIS: ICD-10-CM

## 2022-08-01 DIAGNOSIS — R19.7 DIARRHEA, UNSPECIFIED TYPE: Primary | ICD-10-CM

## 2022-08-01 DIAGNOSIS — I48.0 PAF (PAROXYSMAL ATRIAL FIBRILLATION): Primary | ICD-10-CM

## 2022-08-01 DIAGNOSIS — Z79.01 WARFARIN ANTICOAGULATION: ICD-10-CM

## 2022-08-01 DIAGNOSIS — Z99.2 ESRD (END STAGE RENAL DISEASE) ON DIALYSIS: ICD-10-CM

## 2022-08-01 DIAGNOSIS — I10 ESSENTIAL HYPERTENSION: ICD-10-CM

## 2022-08-01 LAB
INR PPP: 3 (ref 0.8–1.2)
PROTHROMBIN TIME: 30.1 SEC (ref 9–12.5)

## 2022-08-01 PROCEDURE — 85610 PROTHROMBIN TIME: CPT | Performed by: INTERNAL MEDICINE

## 2022-08-01 PROCEDURE — 93793 ANTICOAG MGMT PT WARFARIN: CPT | Mod: ,,,

## 2022-08-01 PROCEDURE — 99214 PR OFFICE/OUTPT VISIT, EST, LEVL IV, 30-39 MIN: ICD-10-PCS | Mod: S$PBB,,, | Performed by: INTERNAL MEDICINE

## 2022-08-01 PROCEDURE — 99214 OFFICE O/P EST MOD 30 MIN: CPT | Mod: S$PBB,,, | Performed by: INTERNAL MEDICINE

## 2022-08-01 PROCEDURE — 93793 PR ANTICOAGULANT MGMT FOR PT TAKING WARFARIN: ICD-10-PCS | Mod: ,,,

## 2022-08-01 PROCEDURE — 99999 PR PBB SHADOW E&M-EST. PATIENT-LVL V: CPT | Mod: PBBFAC,,, | Performed by: INTERNAL MEDICINE

## 2022-08-01 PROCEDURE — 99999 PR PBB SHADOW E&M-EST. PATIENT-LVL V: ICD-10-PCS | Mod: PBBFAC,,, | Performed by: INTERNAL MEDICINE

## 2022-08-01 PROCEDURE — 36415 COLL VENOUS BLD VENIPUNCTURE: CPT | Mod: PO | Performed by: INTERNAL MEDICINE

## 2022-08-01 PROCEDURE — 99215 OFFICE O/P EST HI 40 MIN: CPT | Mod: PBBFAC,PO | Performed by: INTERNAL MEDICINE

## 2022-08-01 NOTE — PROGRESS NOTES
INR has rapidly increased to 3.0 after only 4 doses of warfarin. See calendar for adjustments to dose. Recheck 8/3.

## 2022-08-01 NOTE — PROGRESS NOTES
Assessment & Plan  Problem List Items Addressed This Visit        Cardiac/Vascular    Essential hypertension    Current Assessment & Plan     The current medical regimen is effective;  continue present plan and medications.                Renal/    ESRD (end stage renal disease) on dialysis    Overview     - Tuesday, Thursday, and Saturday  - [place]  - Dr. Hart, nephrology           Current Assessment & Plan     Counseled on restarting home meds.               Other Visit Diagnoses     Diarrhea, unspecified type    -  Primary  -    Unclear etiology but present in HD and recent hospitalization.  Check stool for WBC, C diff and culture. Discussed stopping pepto bismol due to salicylate & using imodium instead; the need to wait to take this until confirmation that she does not have C diff.      Relevant Orders    WBC, Stool    Clostridium difficile EIA    Stool culture            Health Maintenance reviewed, deferred due to the above discussion.      Follow-up: Follow up if symptoms worsen or fail to improve.  ______________________________________________________________________    Chief Complaint  Chief Complaint   Patient presents with    Diarrhea       HPI  Eva Bloom is a 56 y.o. female with medical diagnoses as listed in the medical history and problem list that presents for diarrhea x 3 weeks.  Pt is known to me with their last appointment 7/5/2022.  I last saw her for an acute visit in 2019.      Reports that she is having many watery bowel movements a day.  Increased urgency for BM as well.  No BRBPR, melena, greasy stools.  No recent antibiotics.  No N/V, F/C/NS.  Tried some pepto bismol which helped slow this down.  Pharmacy has been out of imodium.        PAST MEDICAL HISTORY:  Past Medical History:   Diagnosis Date    Anemia     Bronchitis 03/01/2017    Cancer 2016    kidney cancer    CHF (congestive heart failure), NYHA class II, chronic, systolic     CMV (cytomegalovirus) antibody  positive     Essential hypertension 9/23/2015    H/O herpes simplex type 2 infection     Herpes simplex type 1 antibody positive     History of kidney cancer     s/p left nephrectomy 1/2016    Hyperparathyroidism, unspecified     Hyperuricemia without signs of inflammatory arthritis and tophaceous disease     Kidney stones     LGSIL (low grade squamous intraepithelial dysplasia)     Myocardiopathy 7/21/2017    Prediabetes     Proteinuria     Renal disorder     Thyroid nodule     Urate nephropathy        PAST SURGICAL HISTORY:  Past Surgical History:   Procedure Laterality Date    BREAST CYST EXCISION      COLONOSCOPY N/A 11/12/2015    COLONOSCOPY N/A 3/12/2021    Procedure: COLONOSCOPY;  Surgeon: Brendon Lanier MD;  Location: [facility];  Service: Endoscopy;  Laterality: N/A;  covid test 3/9, labs prior, prep instr mailed -ml    INSERTION OF BIVENTRICULAR IMPLANTABLE CARDIOVERTER-DEFIBRILLATOR (ICD)  04/2021    NEPHRECTOMY-LAPAROSCOPIC Left 01/12/2016    PERITONEAL CATHETER INSERTION      Permacath insertion  01/12/2017    SALPINGOOPHORECTOMY Right 2016    KJB---DAVINCI    TONSILLECTOMY      TUBAL LIGATION         SOCIAL HISTORY:  Social History     Socioeconomic History    Marital status:     Number of children: 2   Occupational History    Occupation: [employer]     Employer: [id number]   Tobacco Use    Smoking status: Never Smoker    Smokeless tobacco: Never Used   Substance and Sexual Activity    Alcohol use: No     Comment: . 2 children. works at [employer].    Drug use: No    Sexual activity: Yes     Partners: Male     Social Determinants of Health     Financial Resource Strain: Low Risk     Difficulty of Paying Living Expenses: Not hard at all   Food Insecurity: No Food Insecurity    Worried About Running Out of Food in the Last Year: Never true    Ran Out of Food in the Last Year: Never true   Transportation Needs: No Transportation Needs    Lack of  Transportation (Medical): No    Lack of Transportation (Non-Medical): No   Physical Activity: Insufficiently Active    Days of Exercise per Week: 3 days    Minutes of Exercise per Session: 30 min   Stress: No Stress Concern Present    Feeling of Stress : Not at all   Social Connections: Socially Integrated    Frequency of Communication with Friends and Family: More than three times a week    Frequency of Social Gatherings with Friends and Family: Once a week    Attends [employer] Services: 1 to 4 times per year    Active Member of Clubs or Organizations: Yes    Attends Club or Organization Meetings: 1 to 4 times per year    Marital Status:    Housing Stability: Low Risk     Unable to Pay for Housing in the Last Year: No    Number of Places Lived in the Last Year: 1    Unstable Housing in the Last Year: No       FAMILY HISTORY:  Family History   Problem Relation Age of Onset    Hypertension Mother     Diabetes Father     Kidney disease Father     No Known Problems Son     No Known Problems Son     Diabetes Maternal Grandfather     No Known Problems Sister     No Known Problems Brother     No Known Problems Maternal Grandmother     No Known Problems Paternal Grandmother     No Known Problems Paternal Grandfather     Heart disease Sister     No Known Problems Sister     No Known Problems Brother     No Known Problems Maternal Aunt     No Known Problems Maternal Uncle     No Known Problems Paternal Aunt     No Known Problems Paternal Uncle     Breast cancer Neg Hx     Colon cancer Neg Hx     Cancer Neg Hx     Stroke Neg Hx     Amblyopia Neg Hx     Blindness Neg Hx     Cataracts Neg Hx     Glaucoma Neg Hx     Macular degeneration Neg Hx     Retinal detachment Neg Hx     Strabismus Neg Hx     Thyroid disease Neg Hx        ALLERGIES AND MEDICATIONS: updated and reviewed.  Review of patient's allergies indicates:   Allergen Reactions    Coreg [carvedilol] Other (See  Comments)     Nausea/vomiting    Allopurinol      Other reaction(s): abnormal transaminases     Current Outpatient Medications   Medication Sig Dispense Refill    acetaminophen (TYLENOL) 500 MG tablet Take 500 mg by mouth every 6 (six) hours as needed for Pain.      CABOMETYX 60 mg Tab TAKE ONE TABLET BY MOUTH ONCE DAILY AT THE SAME TIME ON AN EMPTY STOMACH AT LEAST 1 HOUR BEFORE OR 2 HOURS AFTER EATING. AVOID GRAPEFRUIT PRODUCTS 30 tablet 4    cloNIDine 0.3 mg/24 hr td ptwk (CATAPRES) 0.3 mg/24 hr Place 1 patch onto the skin every 7 days. 4 patch 11    epoetin david-epbx (RETACRIT INJ) Epoetin david - epbx (Retacrit)      lidocaine-prilocaine (EMLA) cream APPLY ATLEAST 30 MINUTES BEFORE TREATMENT 3 TIMES A WEEK 30 g 11    lisinopriL (PRINIVIL,ZESTRIL) 20 MG tablet Take 1 tablet (20 mg total) by mouth once daily. 90 tablet 3    metoprolol succinate (TOPROL-XL) 100 MG 24 hr tablet Take 1 tablet (100 mg total) by mouth once daily. 90 tablet 0    mv,Ca,min-folic acid-vit K1 (ONE-A-DAY WOMEN'S 50 PLUS) 400-20 mcg Tab Take 1 tablet by mouth.      sodium chloride 0.9% SolP 100 mL with iron sucrose 100 mg iron/5 mL Soln 100 mg 50 mg.      warfarin (COUMADIN) 5 MG tablet Take 1 tablet (5 mg total) by mouth Daily. OR AS DIRECTED BY COUMADIN CLINIC 30 tablet 11    sevelamer carbonate (RENVELA) 800 mg Tab Take 2 tablets (1,600 mg total) by mouth 3 (three) times daily with meals. 540 tablet 0    sucroferric oxyhydroxide (VELPHORO) 500 mg Chew Take 1 tablet (500 mg total) by mouth 3 (three) times daily. 270 tablet 0     No current facility-administered medications for this visit.         ROS  Review of Systems   Constitutional: Negative for chills, fever and unexpected weight change.   Respiratory: Negative for cough, chest tightness, shortness of breath and wheezing.    Cardiovascular: Negative for chest pain, palpitations and leg swelling.   Gastrointestinal: Positive for diarrhea. Negative for abdominal distention,  "abdominal pain, blood in stool, constipation, nausea, rectal pain and vomiting.   Musculoskeletal: Negative for arthralgias, joint swelling and myalgias.   Skin: Negative for color change and rash.   Neurological: Negative for dizziness, weakness, light-headedness and headaches.   Psychiatric/Behavioral: Negative for decreased concentration, dysphoric mood, sleep disturbance and suicidal ideas.           Physical Exam  Vitals:    08/01/22 1004   BP: 130/72   BP Location: Right arm   Patient Position: Sitting   BP Method: Medium (Manual)   Pulse: 83   Temp: 97.9 °F (36.6 °C)   TempSrc: Oral   SpO2: 97%   Weight: 55.3 kg (121 lb 14.6 oz)   Height: 5' 4" (1.626 m)    Body mass index is 20.93 kg/m².  Weight: 55.3 kg (121 lb 14.6 oz)   Height: 5' 4" (162.6 cm)   Physical Exam  Constitutional:       General: She is not in acute distress.     Appearance: She is well-developed.   HENT:      Head: Normocephalic and atraumatic.   Eyes:      General: Lids are normal. No scleral icterus.     Conjunctiva/sclera: Conjunctivae normal.      Pupils: Pupils are equal, round, and reactive to light.   Neck:      Thyroid: No thyromegaly.      Vascular: No carotid bruit or JVD.   Cardiovascular:      Rate and Rhythm: Normal rate and regular rhythm.      Pulses: Normal pulses.      Heart sounds: Murmur heard.     No friction rub. No S3 or S4 sounds.   Pulmonary:      Effort: Pulmonary effort is normal.      Breath sounds: Normal breath sounds. No wheezing, rhonchi or rales.   Abdominal:      General: Bowel sounds are normal. There is no distension.      Palpations: Abdomen is soft.      Tenderness: There is no abdominal tenderness. There is no guarding or rebound.   Musculoskeletal:         General: No tenderness.      Cervical back: Full passive range of motion without pain and neck supple.      Right lower leg: No edema.      Left lower leg: No edema.   Skin:     General: Skin is warm and dry.      Findings: No rash.   Neurological:     "  Mental Status: She is alert and oriented to person, place, and time.   Psychiatric:         Speech: Speech normal.         Behavior: Behavior normal.         Thought Content: Thought content normal.             Health Maintenance       Date Due Completion Date    Hemoglobin A1c 08/26/2021 2/26/2021    Lipid Panel 02/26/2022 2/26/2021    COVID-19 Vaccine (4 - Booster for Moderna series) 03/11/2022 11/11/2021    Influenza Vaccine (1) 09/01/2022 9/30/2021    Mammogram 11/12/2022 11/12/2021    Cervical Cancer Screening 11/16/2023 11/16/2018    TETANUS VACCINE 09/23/2025 9/23/2015    Colorectal Cancer Screening 03/12/2028 3/12/2021    Pneumococcal Vaccines (Age 0-64) (4 - PPSV23 or PCV20) 10/01/2029 11/9/2016

## 2022-08-03 ENCOUNTER — ANTI-COAG VISIT (OUTPATIENT)
Dept: CARDIOLOGY | Facility: CLINIC | Age: 57
End: 2022-08-03
Payer: MEDICARE

## 2022-08-03 ENCOUNTER — LAB VISIT (OUTPATIENT)
Dept: LAB | Facility: HOSPITAL | Age: 57
End: 2022-08-03
Attending: INTERNAL MEDICINE
Payer: COMMERCIAL

## 2022-08-03 DIAGNOSIS — R19.7 DIARRHEA, UNSPECIFIED TYPE: ICD-10-CM

## 2022-08-03 DIAGNOSIS — I48.0 PAF (PAROXYSMAL ATRIAL FIBRILLATION): Primary | ICD-10-CM

## 2022-08-03 DIAGNOSIS — Z79.01 WARFARIN ANTICOAGULATION: ICD-10-CM

## 2022-08-03 DIAGNOSIS — I48.0 PAF (PAROXYSMAL ATRIAL FIBRILLATION): ICD-10-CM

## 2022-08-03 LAB
INR PPP: 3 (ref 0.8–1.2)
PROTHROMBIN TIME: 30.3 SEC (ref 9–12.5)
WBC #/AREA STL HPF: NORMAL /[HPF]

## 2022-08-03 PROCEDURE — 87427 SHIGA-LIKE TOXIN AG IA: CPT | Performed by: INTERNAL MEDICINE

## 2022-08-03 PROCEDURE — 93793 PR ANTICOAGULANT MGMT FOR PT TAKING WARFARIN: ICD-10-PCS | Mod: ,,,

## 2022-08-03 PROCEDURE — 87046 STOOL CULTR AEROBIC BACT EA: CPT | Performed by: INTERNAL MEDICINE

## 2022-08-03 PROCEDURE — 85610 PROTHROMBIN TIME: CPT | Performed by: INTERNAL MEDICINE

## 2022-08-03 PROCEDURE — 87449 NOS EACH ORGANISM AG IA: CPT | Performed by: INTERNAL MEDICINE

## 2022-08-03 PROCEDURE — 36415 COLL VENOUS BLD VENIPUNCTURE: CPT | Mod: PO | Performed by: INTERNAL MEDICINE

## 2022-08-03 PROCEDURE — 93793 ANTICOAG MGMT PT WARFARIN: CPT | Mod: ,,,

## 2022-08-03 PROCEDURE — 89055 LEUKOCYTE ASSESSMENT FECAL: CPT | Performed by: INTERNAL MEDICINE

## 2022-08-03 PROCEDURE — 87045 FECES CULTURE AEROBIC BACT: CPT | Performed by: INTERNAL MEDICINE

## 2022-08-03 NOTE — PROGRESS NOTES
INR remains at 3.0 after adjustments made. Will start new maintenance dose & recheck INR on Monday.

## 2022-08-04 LAB
C DIFF GDH STL QL: NEGATIVE
C DIFF TOX A+B STL QL IA: NEGATIVE
E COLI SXT1 STL QL IA: NEGATIVE
E COLI SXT2 STL QL IA: NEGATIVE

## 2022-08-06 LAB — BACTERIA STL CULT: NORMAL

## 2022-08-08 ENCOUNTER — ANTI-COAG VISIT (OUTPATIENT)
Dept: CARDIOLOGY | Facility: CLINIC | Age: 57
End: 2022-08-08
Payer: MEDICARE

## 2022-08-08 ENCOUNTER — PATIENT MESSAGE (OUTPATIENT)
Dept: FAMILY MEDICINE | Facility: CLINIC | Age: 57
End: 2022-08-08
Payer: MEDICARE

## 2022-08-08 ENCOUNTER — LAB VISIT (OUTPATIENT)
Dept: LAB | Facility: HOSPITAL | Age: 57
End: 2022-08-08
Attending: INTERNAL MEDICINE
Payer: MEDICARE

## 2022-08-08 DIAGNOSIS — I48.0 PAF (PAROXYSMAL ATRIAL FIBRILLATION): ICD-10-CM

## 2022-08-08 DIAGNOSIS — I48.0 PAF (PAROXYSMAL ATRIAL FIBRILLATION): Primary | ICD-10-CM

## 2022-08-08 DIAGNOSIS — Z79.01 WARFARIN ANTICOAGULATION: ICD-10-CM

## 2022-08-08 LAB
INR PPP: 2.6 (ref 0.8–1.2)
PROTHROMBIN TIME: 26.9 SEC (ref 9–12.5)

## 2022-08-08 PROCEDURE — 85610 PROTHROMBIN TIME: CPT | Performed by: INTERNAL MEDICINE

## 2022-08-08 PROCEDURE — 93793 PR ANTICOAGULANT MGMT FOR PT TAKING WARFARIN: ICD-10-PCS | Mod: ,,,

## 2022-08-08 PROCEDURE — 36415 COLL VENOUS BLD VENIPUNCTURE: CPT | Mod: PO | Performed by: INTERNAL MEDICINE

## 2022-08-08 PROCEDURE — 93793 ANTICOAG MGMT PT WARFARIN: CPT | Mod: ,,,

## 2022-08-09 DIAGNOSIS — I10 ESSENTIAL HYPERTENSION: ICD-10-CM

## 2022-08-09 RX ORDER — HYDRALAZINE HYDROCHLORIDE 100 MG/1
100 TABLET, FILM COATED ORAL EVERY 12 HOURS
Qty: 180 TABLET | Refills: 11 | OUTPATIENT
Start: 2022-08-09

## 2022-08-09 NOTE — TELEPHONE ENCOUNTER
----- Message from Kaylin Huerta sent at 8/9/2022  9:28 AM CDT -----  Type: RX Refill Request    Who Called:  Self     Have you contacted your pharmacy:  yes     Refill or New Rx: Refill     RX Name and Strength:hydrALAZINE tablet 100 mg         Preferred Pharmacy with phone number:[facility]/pharmacy #[id number] - [place] - [address] [facility]   Phone:  [phone]  Fax:  [phone]          Local or Mail Order:Local     Ordering Provider: Dr. Hart     Would the patient rather a call back or a response via [employer]? Call     Best Call Back Number:.[phone]         Patient says her meds were prescribed by Dr. Hart  and were cancelled out when he left.

## 2022-08-11 DIAGNOSIS — N18.6 ESRD (END STAGE RENAL DISEASE): ICD-10-CM

## 2022-08-11 DIAGNOSIS — N25.81 HYPERPARATHYROIDISM, SECONDARY RENAL: Primary | ICD-10-CM

## 2022-08-11 RX ORDER — HYDRALAZINE HYDROCHLORIDE 100 MG/1
100 TABLET, FILM COATED ORAL EVERY 12 HOURS
Qty: 60 TABLET | Refills: 2 | Status: SHIPPED | OUTPATIENT
Start: 2022-08-11 | End: 2022-10-24

## 2022-08-22 ENCOUNTER — LAB VISIT (OUTPATIENT)
Dept: LAB | Facility: HOSPITAL | Age: 57
End: 2022-08-22
Attending: INTERNAL MEDICINE
Payer: MEDICARE

## 2022-08-22 ENCOUNTER — ANTI-COAG VISIT (OUTPATIENT)
Dept: CARDIOLOGY | Facility: CLINIC | Age: 57
End: 2022-08-22
Payer: MEDICARE

## 2022-08-22 DIAGNOSIS — Z79.01 WARFARIN ANTICOAGULATION: ICD-10-CM

## 2022-08-22 DIAGNOSIS — I48.0 PAF (PAROXYSMAL ATRIAL FIBRILLATION): Primary | ICD-10-CM

## 2022-08-22 DIAGNOSIS — I48.0 PAF (PAROXYSMAL ATRIAL FIBRILLATION): ICD-10-CM

## 2022-08-22 LAB
INR PPP: 1.4 (ref 0.8–1.2)
PROTHROMBIN TIME: 14.6 SEC (ref 9–12.5)

## 2022-08-22 PROCEDURE — 36415 COLL VENOUS BLD VENIPUNCTURE: CPT | Mod: PO | Performed by: INTERNAL MEDICINE

## 2022-08-22 PROCEDURE — 93793 ANTICOAG MGMT PT WARFARIN: CPT | Mod: ,,,

## 2022-08-22 PROCEDURE — 93793 PR ANTICOAGULANT MGMT FOR PT TAKING WARFARIN: ICD-10-PCS | Mod: ,,,

## 2022-08-22 PROCEDURE — 85610 PROTHROMBIN TIME: CPT | Performed by: INTERNAL MEDICINE

## 2022-08-22 NOTE — PROGRESS NOTES
INR not at goal. Medications, chart, and patient findings reviewed. See calendar for adjustments to dose and follow up plan. Will advise to contact PCP regarding blood in stool & report to ED if bleeding resumes, has any dark stools or feels weak, lightheaded or dizzy.

## 2022-08-29 ENCOUNTER — ANTI-COAG VISIT (OUTPATIENT)
Dept: CARDIOLOGY | Facility: CLINIC | Age: 57
End: 2022-08-29
Payer: MEDICARE

## 2022-08-29 ENCOUNTER — LAB VISIT (OUTPATIENT)
Dept: LAB | Facility: HOSPITAL | Age: 57
End: 2022-08-29
Attending: INTERNAL MEDICINE
Payer: COMMERCIAL

## 2022-08-29 DIAGNOSIS — Z79.01 WARFARIN ANTICOAGULATION: ICD-10-CM

## 2022-08-29 DIAGNOSIS — I48.0 PAF (PAROXYSMAL ATRIAL FIBRILLATION): ICD-10-CM

## 2022-08-29 DIAGNOSIS — I48.0 PAF (PAROXYSMAL ATRIAL FIBRILLATION): Primary | ICD-10-CM

## 2022-08-29 LAB
INR PPP: 1.3 (ref 0.8–1.2)
PROTHROMBIN TIME: 14.1 SEC (ref 9–12.5)

## 2022-08-29 PROCEDURE — 93793 ANTICOAG MGMT PT WARFARIN: CPT | Mod: S$GLB,,,

## 2022-08-29 PROCEDURE — 36415 COLL VENOUS BLD VENIPUNCTURE: CPT | Mod: PO | Performed by: INTERNAL MEDICINE

## 2022-08-29 PROCEDURE — 93793 PR ANTICOAGULANT MGMT FOR PT TAKING WARFARIN: ICD-10-PCS | Mod: S$GLB,,,

## 2022-08-29 PROCEDURE — 85610 PROTHROMBIN TIME: CPT | Performed by: INTERNAL MEDICINE

## 2022-08-29 NOTE — PROGRESS NOTES
INR not at goal. Medications, chart, and patient findings reviewed. 5mg 8/29, then increased maintenance per calendar. See calendar for adjustments to dose and follow up plan.

## 2022-09-01 ENCOUNTER — TELEPHONE (OUTPATIENT)
Dept: VASCULAR SURGERY | Facility: CLINIC | Age: 57
End: 2022-09-01
Payer: MEDICARE

## 2022-09-01 DIAGNOSIS — N25.81 HYPERPARATHYROIDISM, SECONDARY RENAL: Primary | ICD-10-CM

## 2022-09-01 DIAGNOSIS — N18.6 ESRD (END STAGE RENAL DISEASE): Primary | ICD-10-CM

## 2022-09-01 DIAGNOSIS — N18.6 ESRD (END STAGE RENAL DISEASE): ICD-10-CM

## 2022-09-01 DIAGNOSIS — Z99.2 ESRD (END STAGE RENAL DISEASE) ON DIALYSIS: ICD-10-CM

## 2022-09-01 DIAGNOSIS — N18.6 ESRD (END STAGE RENAL DISEASE) ON DIALYSIS: ICD-10-CM

## 2022-09-01 NOTE — TELEPHONE ENCOUNTER
Contacted patient in response to message. Appointment scheduled, pt verified.     ----- Message from Corby Hines sent at 9/1/2022 11:45 AM CDT -----  Regarding: reminder letter rcvd      The Pt states that she rcvd her reminder letter to sched her f/u appt    Please contact the Pt to sched    Ph # [phone]

## 2022-09-14 ENCOUNTER — OFFICE VISIT (OUTPATIENT)
Dept: CARDIOLOGY | Facility: CLINIC | Age: 57
End: 2022-09-14
Payer: MEDICARE

## 2022-09-14 VITALS
DIASTOLIC BLOOD PRESSURE: 75 MMHG | HEIGHT: 64 IN | WEIGHT: 120.13 LBS | BODY MASS INDEX: 20.51 KG/M2 | HEART RATE: 75 BPM | OXYGEN SATURATION: 97 % | SYSTOLIC BLOOD PRESSURE: 158 MMHG

## 2022-09-14 DIAGNOSIS — I34.0 NONRHEUMATIC MITRAL VALVE REGURGITATION: ICD-10-CM

## 2022-09-14 DIAGNOSIS — D69.6 THROMBOCYTOPENIA: ICD-10-CM

## 2022-09-14 DIAGNOSIS — I49.8 OTHER SPECIFIED CARDIAC ARRHYTHMIAS: Primary | ICD-10-CM

## 2022-09-14 DIAGNOSIS — N18.6 ANEMIA IN ESRD (END-STAGE RENAL DISEASE): ICD-10-CM

## 2022-09-14 DIAGNOSIS — Z79.01 CHRONIC ANTICOAGULATION: ICD-10-CM

## 2022-09-14 DIAGNOSIS — N18.6 ESRD (END STAGE RENAL DISEASE) ON DIALYSIS: ICD-10-CM

## 2022-09-14 DIAGNOSIS — I42.8 CARDIOMYOPATHY, NONISCHEMIC: ICD-10-CM

## 2022-09-14 DIAGNOSIS — D63.1 ANEMIA IN ESRD (END-STAGE RENAL DISEASE): ICD-10-CM

## 2022-09-14 DIAGNOSIS — I51.89 COMBINED SYSTOLIC AND DIASTOLIC CARDIAC DYSFUNCTION: ICD-10-CM

## 2022-09-14 DIAGNOSIS — I27.20 PULMONARY HYPERTENSION: ICD-10-CM

## 2022-09-14 DIAGNOSIS — Z95.810 ICD (IMPLANTABLE CARDIOVERTER-DEFIBRILLATOR) IN PLACE: ICD-10-CM

## 2022-09-14 DIAGNOSIS — I48.0 PAF (PAROXYSMAL ATRIAL FIBRILLATION): Primary | ICD-10-CM

## 2022-09-14 DIAGNOSIS — Z99.2 ESRD (END STAGE RENAL DISEASE) ON DIALYSIS: ICD-10-CM

## 2022-09-14 DIAGNOSIS — N25.81 HYPERPARATHYROIDISM, SECONDARY RENAL: ICD-10-CM

## 2022-09-14 DIAGNOSIS — I10 ESSENTIAL HYPERTENSION: ICD-10-CM

## 2022-09-14 PROCEDURE — 99214 OFFICE O/P EST MOD 30 MIN: CPT | Mod: S$PBB,,, | Performed by: INTERNAL MEDICINE

## 2022-09-14 PROCEDURE — 99215 OFFICE O/P EST HI 40 MIN: CPT | Mod: PBBFAC | Performed by: NURSE PRACTITIONER

## 2022-09-14 PROCEDURE — 99214 PR OFFICE/OUTPT VISIT, EST, LEVL IV, 30-39 MIN: ICD-10-PCS | Mod: S$PBB,,, | Performed by: INTERNAL MEDICINE

## 2022-09-14 PROCEDURE — 99999 PR PBB SHADOW E&M-EST. PATIENT-LVL V: ICD-10-PCS | Mod: PBBFAC,,, | Performed by: NURSE PRACTITIONER

## 2022-09-14 PROCEDURE — 99999 PR PBB SHADOW E&M-EST. PATIENT-LVL V: CPT | Mod: PBBFAC,,, | Performed by: NURSE PRACTITIONER

## 2022-09-14 RX ORDER — SACUBITRIL AND VALSARTAN 49; 51 MG/1; MG/1
1 TABLET, FILM COATED ORAL 2 TIMES DAILY
Qty: 60 TABLET | Refills: 11 | Status: SHIPPED | OUTPATIENT
Start: 2022-09-14 | End: 2022-10-26 | Stop reason: SDUPTHER

## 2022-09-14 NOTE — PROGRESS NOTES
Ms. Bloom is a patient of Dr. Witt and was last seen in [facility] 7/7/2022.      Subjective:   Patient ID:  Eva Bloom is a 56 y.o. female who presents for follow-up of Combined systolic and diastolic cardiac dysfunction , AICD Problem, and Shortness of Breath    Problems:  HTN  Glucose intolerance  ESRD on hemodialysis  Combined systolic and diastolic dysfunction, EF 38%  ICD placed in 4/2021 (Dr. Chisholm)  Pulmonary HTN  Mild MR  Mild TR  Recurrent renal cell carcinoma, s/p nephrectomy  Small pericardial effusion 6/2017  Non-ischemic cardiomyopathy    HPI  Ms. Bloom is in clinic today for routine follow up.  Reports a 1 week h/o pain at the ICD site that is interrupting her sleep.  She is having some SOB that started around the same time.  She has pain on the left chest wall when she takes a deep breath in.  Denies any fever/chills/recent viral illness.  Requested labs drawn by [facility] on Tuesday.  No change in the HOB or PND.  She has been off the warfarin for the last week because it made her feel poorly.      Review of Systems   Constitutional: Positive for malaise/fatigue. Negative for decreased appetite, diaphoresis, weight gain and weight loss.   Eyes:  Negative for visual disturbance.   Cardiovascular:  Positive for chest pain. Negative for claudication, dyspnea on exertion, irregular heartbeat, leg swelling, near-syncope, orthopnea, palpitations, paroxysmal nocturnal dyspnea and syncope.        Denies chest pressure   Respiratory:  Positive for shortness of breath. Negative for cough, hemoptysis, sleep disturbances due to breathing and snoring.    Endocrine: Negative for cold intolerance and heat intolerance.   Hematologic/Lymphatic: Negative for bleeding problem. Does not bruise/bleed easily.   Musculoskeletal:  Negative for myalgias.   Gastrointestinal:  Negative for bloating, abdominal pain, anorexia, change in bowel habit, constipation, diarrhea, nausea and vomiting.   Neurological:   Negative for difficulty with concentration, disturbances in coordination, excessive daytime sleepiness, dizziness, headaches, light-headedness, loss of balance, numbness and weakness.   Psychiatric/Behavioral:  The patient does not have insomnia.      Allergies and current medications updated and reviewed:  Review of patient's allergies indicates:   Allergen Reactions    Coreg [carvedilol] Other (See Comments)     Nausea/vomiting    Allopurinol      Other reaction(s): abnormal transaminases     Current Outpatient Medications   Medication Sig    acetaminophen (TYLENOL) 500 MG tablet Take 500 mg by mouth every 6 (six) hours as needed for Pain.    CABOMETYX 60 mg Tab TAKE ONE TABLET BY MOUTH ONCE DAILY AT THE SAME TIME ON AN EMPTY STOMACH AT LEAST 1 HOUR BEFORE OR 2 HOURS AFTER EATING. AVOID GRAPEFRUIT PRODUCTS    cloNIDine 0.3 mg/24 hr td ptwk (CATAPRES) 0.3 mg/24 hr Place 1 patch onto the skin every 7 days.    epoetin david-epbx (RETACRIT INJ) Epoetin david - epbx (Retacrit)    hydrALAZINE (APRESOLINE) 100 MG tablet Take 1 tablet (100 mg total) by mouth every 12 (twelve) hours.    lidocaine-prilocaine (EMLA) cream APPLY ATLEAST 30 MINUTES BEFORE TREATMENT 3 TIMES A WEEK    lisinopriL (PRINIVIL,ZESTRIL) 20 MG tablet Take 1 tablet (20 mg total) by mouth once daily.    metoprolol succinate (TOPROL-XL) 100 MG 24 hr tablet Take 1 tablet (100 mg total) by mouth once daily.    mv,Ca,min-folic acid-vit K1 (ONE-A-DAY WOMEN'S 50 PLUS) 400-20 mcg Tab Take 1 tablet by mouth.    sodium chloride 0.9% SolP 100 mL with iron sucrose 100 mg iron/5 mL Soln 100 mg 50 mg.    sevelamer carbonate (RENVELA) 800 mg Tab TAKE 2 TABLETS (1,600 MG TOTAL) BY MOUTH 3 (THREE) TIMES DAILY WITH MEALS. (Patient not taking: Reported on 9/14/2022)    warfarin (COUMADIN) 5 MG tablet Take 1 tablet (5 mg total) by mouth Daily. OR AS DIRECTED BY COUMADIN CLINIC (Patient not taking: Reported on 9/14/2022)     No current facility-administered medications for  "this visit.       Objective:     Right Arm BP - Sittin/75 (22 1441)    BP (!) 158/75 (BP Location: Right arm, Patient Position: Sitting, BP Method: Medium (Automatic))   Pulse 75   Ht 5' 4" (1.626 m)   Wt 54.5 kg (120 lb 2.4 oz)   LMP 10/24/2016   SpO2 97%   BMI 20.62 kg/m²       Physical Exam  Vitals and nursing note reviewed.   Constitutional:       General: She is not in acute distress.     Appearance: Normal appearance. She is well-developed. She is not diaphoretic.   HENT:      Head: Normocephalic and atraumatic.   Eyes:      General: Lids are normal. No scleral icterus.     Conjunctiva/sclera: Conjunctivae normal.   Neck:      Vascular: Normal carotid pulses. No carotid bruit, hepatojugular reflux or JVD.   Cardiovascular:      Rate and Rhythm: Normal rate and regular rhythm.      Chest Wall: PMI is not displaced.      Pulses: Intact distal pulses.           Carotid pulses are 2+ on the right side and 2+ on the left side.       Radial pulses are 2+ on the right side and 2+ on the left side.        Dorsalis pedis pulses are 2+ on the right side and 2+ on the left side.        Posterior tibial pulses are 1+ on the right side and 1+ on the left side.      Heart sounds: S1 normal and S2 normal. No murmur heard.    No friction rub. No gallop.   Pulmonary:      Effort: Pulmonary effort is normal. No respiratory distress.      Breath sounds: Normal breath sounds. No decreased breath sounds, wheezing, rhonchi or rales.   Chest:      Chest wall: No tenderness.   Abdominal:      General: Bowel sounds are normal. There is no distension or abdominal bruit.      Palpations: Abdomen is soft. There is no fluid wave or pulsatile mass.      Tenderness: There is no abdominal tenderness.   Musculoskeletal:      Cervical back: Neck supple.   Skin:     General: Skin is warm and dry.      Findings: No rash.      Nails: There is no clubbing.   Neurological:      Mental Status: She is alert and oriented to person, " place, and time.      Gait: Gait normal.   Psychiatric:         Speech: Speech normal.         Behavior: Behavior normal.         Thought Content: Thought content normal.         Judgment: Judgment normal.       Chemistry        Component Value Date/Time     06/28/2022 0959    K 3.9 06/28/2022 0959    CL 98 06/28/2022 0959    CO2 23 06/28/2022 0959    BUN 28 (H) 06/28/2022 0959    CREATININE 4.1 (H) 06/28/2022 0959    GLU 83 06/28/2022 0959        Component Value Date/Time    CALCIUM 8.8 06/28/2022 0959    ALKPHOS 306 (H) 06/28/2022 0959    AST 65 (H) 06/28/2022 0959    ALT 35 06/28/2022 0959    BILITOT 1.4 (H) 06/28/2022 0959    ESTGFRAFRICA 13 (A) 06/28/2022 0959    EGFRNONAA 11 (A) 06/28/2022 0959        Lab Results   Component Value Date    HGBA1C 5.2 02/26/2021     Recent Labs   Lab 03/14/20  0437 03/15/20  0550 02/26/21  0729 04/09/21  0628 09/13/21  0824 06/09/22  1104 06/28/22  1125 06/29/22  0427 09/21/22  1016   WBC 4.96   < > 4.39   < > 4.39   < > 5.99   < > 5.21   Hemoglobin 10.9 L   < > 10.4 L   < > 11.6 L   < > 7.7 L   < > 8.6 L   Hematocrit 35.4 L   < > 31.9 L   < > 35.4 L   < > 25.0 L   < > 25.8 L    H   < > 107 H   < > 105 H   < > 109 H   < > 103 H   Platelets 168   < > 125 L   < > 135 L   < > 204   < > 185   BNP >4,900 H  --   --   --   --   --  >4,900 H  --   --    TSH  --    < >  --   --  7.350 H  --   --   --   --    Cholesterol  --   --  99 L  --   --   --   --   --   --    HDL  --   --  34 L  --   --   --   --   --   --    LDL Cholesterol  --   --  54.4 L  --   --   --   --   --   --    Triglycerides  --   --  53  --   --   --   --   --   --    HDL/Cholesterol Ratio  --   --  34.3  --   --   --   --   --   --     < > = values in this interval not displayed.       Recent Labs   Lab 08/03/22  1035 08/08/22  0730 08/22/22  0815 08/29/22  0809   INR 3.0 H 2.6 H 1.4 H 1.3 H        Test(s) Reviewed  I have reviewed the following in detail:  [] Stress test   [] Angiography   [x]  Echocardiogram   [x] Labs   [] Other:         Assessment/Plan:   1. PAF (paroxysmal atrial fibrillation)  Prefers oac  therapy. INR already <2.  Stop warfarin and start apixaban 2.5 mg BID.  Refer to pharmacy assistance.     - apixaban (ELIQUIS) 2.5 mg Tab; Take 1 tablet (2.5 mg total) by mouth 2 (two) times daily.  Dispense: 60 tablet; Refill: 11  - Ambulatory referral/consult to Pharmacy Assistance; Future    2. Chronic anticoagulation  Having nose bleeding on warfarin.  Switch to oac therapy.  Discussed when to seek immediate medical attention.       3. Cardiomyopathy, nonischemic  Well compensated. NYHA class II.  Not on transplant list d/t low EF.  Change ACEI to ARNI.  Instructed to stop lisinopril.  Start entresto 49-51 mg on Friday evening BID.  Continue metoprolol succinate 100 mg daily. At next visit, consider increasing her metoprolol to 150 mg or increasing her entresto to  mg BID pending BP and HD tolerance. Eventually would like her on bidil or the components (hydralazine/isordil).  Plan to decrease clonidine patch for bp room for GDMT for her CM/HF.      - sacubitriL-valsartan (ENTRESTO) 49-51 mg per tablet; Take 1 tablet by mouth 2 (two) times daily.  Dispense: 60 tablet; Refill: 11    4. Combined systolic and diastolic cardiac dysfunction  See #3     - sacubitriL-valsartan (ENTRESTO) 49-51 mg per tablet; Take 1 tablet by mouth 2 (two) times daily.  Dispense: 60 tablet; Refill: 11  - Ambulatory referral/consult to Pharmacy Assistance; Future    4. ICD (implantable cardioverter-defibrillator) in place - SubQ  Denies discharge from device.  Has some irritation of the device d/t location under her arm/side of her left breast.  Her bra rubs on the device and she has very little subq fat.  Discussed going braless or wearing stick on estrella so prevent having the device restricted by her bra. Site is not warm or red.  No obvious infection.  Had Dr. Campbell  look at site.  Will have her f/u with   Kathrine.     7. Nonrheumatic mitral valve regurgitation      8. Essential hypertension  BP not at goal <130/80. See CM problem for changes.       9. Pulmonary hypertension      10. ESRD (end stage renal disease) on dialysis      11. Anemia in ESRD (end-stage renal disease)  On procrit. Followed by nephrology    12. Hyperparathyroidism, secondary renal  Defer to nephrology    13. Thrombocytopenia  PLT normalized.  Resolved.      The patient was discussed and examined by Dr. Campbell    A copy of this note will be forwarded to Dr. Witt, Dr. Murdock and Dr. Mccain.     Follow up in about 3 months (around 12/14/2022).

## 2022-09-14 NOTE — Clinical Note
Justin Murdock,  I would like to see her on max HF GDMT if possible.  If her bp at HD becomes too low, please lmk or consider lowering clonidine patch dose as I'd like her on max entresto and higher dose of either metoprolol or carvedilol.  Ideally, we will work to get her onto the hydralazine/isordil combo as well to help out her systolic function.   Thank you, Sowmya

## 2022-09-14 NOTE — PATIENT INSTRUCTIONS
Stay off the warfarin.  Re-start the eliquis 2.5 mg twice daily    Stop the lisinopril.     Start the entresto 49-51 mg Friday evening.  It will be a twice daily medication.

## 2022-09-19 ENCOUNTER — TELEPHONE (OUTPATIENT)
Dept: HEMATOLOGY/ONCOLOGY | Facility: CLINIC | Age: 57
End: 2022-09-19
Payer: MEDICARE

## 2022-09-21 ENCOUNTER — LAB VISIT (OUTPATIENT)
Dept: LAB | Facility: HOSPITAL | Age: 57
End: 2022-09-21
Attending: INTERNAL MEDICINE
Payer: MEDICARE

## 2022-09-21 ENCOUNTER — OFFICE VISIT (OUTPATIENT)
Dept: HEMATOLOGY/ONCOLOGY | Facility: CLINIC | Age: 57
End: 2022-09-21
Payer: MEDICARE

## 2022-09-21 VITALS
DIASTOLIC BLOOD PRESSURE: 60 MMHG | HEIGHT: 64 IN | HEART RATE: 69 BPM | TEMPERATURE: 98 F | WEIGHT: 119.94 LBS | BODY MASS INDEX: 20.47 KG/M2 | OXYGEN SATURATION: 98 % | SYSTOLIC BLOOD PRESSURE: 126 MMHG

## 2022-09-21 DIAGNOSIS — N18.6 ANEMIA DUE TO END STAGE RENAL DISEASE: Primary | ICD-10-CM

## 2022-09-21 DIAGNOSIS — D64.9 ANEMIA, UNSPECIFIED TYPE: ICD-10-CM

## 2022-09-21 DIAGNOSIS — D63.1 ANEMIA DUE TO END STAGE RENAL DISEASE: Primary | ICD-10-CM

## 2022-09-21 DIAGNOSIS — C64.9 METASTATIC RENAL CELL CARCINOMA, UNSPECIFIED LATERALITY: ICD-10-CM

## 2022-09-21 LAB
BASOPHILS # BLD AUTO: 0.05 K/UL (ref 0–0.2)
BASOPHILS NFR BLD: 1 % (ref 0–1.9)
DIFFERENTIAL METHOD: ABNORMAL
EOSINOPHIL # BLD AUTO: 0.2 K/UL (ref 0–0.5)
EOSINOPHIL NFR BLD: 2.9 % (ref 0–8)
ERYTHROCYTE [DISTWIDTH] IN BLOOD BY AUTOMATED COUNT: 19.3 % (ref 11.5–14.5)
HCT VFR BLD AUTO: 25.8 % (ref 37–48.5)
HGB BLD-MCNC: 8.6 G/DL (ref 12–16)
IMM GRANULOCYTES # BLD AUTO: 0.02 K/UL (ref 0–0.04)
IMM GRANULOCYTES NFR BLD AUTO: 0.4 % (ref 0–0.5)
LYMPHOCYTES # BLD AUTO: 1 K/UL (ref 1–4.8)
LYMPHOCYTES NFR BLD: 19.2 % (ref 18–48)
MCH RBC QN AUTO: 34.3 PG (ref 27–31)
MCHC RBC AUTO-ENTMCNC: 33.3 G/DL (ref 32–36)
MCV RBC AUTO: 103 FL (ref 82–98)
MONOCYTES # BLD AUTO: 0.6 K/UL (ref 0.3–1)
MONOCYTES NFR BLD: 12.3 % (ref 4–15)
NEUTROPHILS # BLD AUTO: 3.4 K/UL (ref 1.8–7.7)
NEUTROPHILS NFR BLD: 64.2 % (ref 38–73)
NRBC BLD-RTO: 1 /100 WBC
PATH REV BLD -IMP: NORMAL
PATH REV BLD -IMP: NORMAL
PLATELET # BLD AUTO: 185 K/UL (ref 150–450)
PMV BLD AUTO: 10.4 FL (ref 9.2–12.9)
RBC # BLD AUTO: 2.51 M/UL (ref 4–5.4)
RETICS/RBC NFR AUTO: 3.6 % (ref 0.5–2.5)
WBC # BLD AUTO: 5.21 K/UL (ref 3.9–12.7)

## 2022-09-21 PROCEDURE — 99214 PR OFFICE/OUTPT VISIT, EST, LEVL IV, 30-39 MIN: ICD-10-PCS | Mod: S$PBB,,, | Performed by: INTERNAL MEDICINE

## 2022-09-21 PROCEDURE — 99999 PR PBB SHADOW E&M-EST. PATIENT-LVL V: CPT | Mod: PBBFAC,,, | Performed by: INTERNAL MEDICINE

## 2022-09-21 PROCEDURE — 84165 PROTEIN E-PHORESIS SERUM: CPT | Mod: 26,,, | Performed by: PATHOLOGY

## 2022-09-21 PROCEDURE — 99214 OFFICE O/P EST MOD 30 MIN: CPT | Mod: S$PBB,,, | Performed by: INTERNAL MEDICINE

## 2022-09-21 PROCEDURE — 99999 PR PBB SHADOW E&M-EST. PATIENT-LVL V: ICD-10-PCS | Mod: PBBFAC,,, | Performed by: INTERNAL MEDICINE

## 2022-09-21 PROCEDURE — 99215 OFFICE O/P EST HI 40 MIN: CPT | Mod: PBBFAC | Performed by: INTERNAL MEDICINE

## 2022-09-21 PROCEDURE — 86334 IMMUNOFIX E-PHORESIS SERUM: CPT | Performed by: INTERNAL MEDICINE

## 2022-09-21 PROCEDURE — 85045 AUTOMATED RETICULOCYTE COUNT: CPT | Performed by: INTERNAL MEDICINE

## 2022-09-21 PROCEDURE — 85060 PATHOLOGIST REVIEW: ICD-10-PCS | Mod: ,,, | Performed by: PATHOLOGY

## 2022-09-21 PROCEDURE — 86334 PATHOLOGIST INTERPRETATION IFE: ICD-10-PCS | Mod: 26,,, | Performed by: PATHOLOGY

## 2022-09-21 PROCEDURE — 85060 BLOOD SMEAR INTERPRETATION: CPT | Mod: ,,, | Performed by: PATHOLOGY

## 2022-09-21 PROCEDURE — 84165 PROTEIN E-PHORESIS SERUM: CPT | Performed by: INTERNAL MEDICINE

## 2022-09-21 PROCEDURE — 84165 PATHOLOGIST INTERPRETATION SPE: ICD-10-PCS | Mod: 26,,, | Performed by: PATHOLOGY

## 2022-09-21 PROCEDURE — 85025 COMPLETE CBC W/AUTO DIFF WBC: CPT | Performed by: INTERNAL MEDICINE

## 2022-09-21 PROCEDURE — 36415 COLL VENOUS BLD VENIPUNCTURE: CPT | Performed by: INTERNAL MEDICINE

## 2022-09-21 PROCEDURE — 86334 IMMUNOFIX E-PHORESIS SERUM: CPT | Mod: 26,,, | Performed by: PATHOLOGY

## 2022-09-21 NOTE — PROGRESS NOTES
Subjective:       Patient ID: Eva Bloom is a 56 y.o. female.    Chief Complaint: Anemia  Reason For Consultation: Anemia  HPI    56 y/o female with extensive PMHx influding HFrEF with AICD, ESRD on HD, recurrent metastatic renal cancer, and HTN in consultation for anemia. She is lost to follow-up with Dr Rivera for recurrent metastatic RCC. Pt is currently taking cabozantinib daily since December 2016. Pt is on ESRD with HD every TTS. Patient tolerating treat extremely well with little to no side effects. She is accompanied by her . She is a poor historian. She remains on cabozantinib. She recently presented to ED with bleeding from her AV fistula. recurrent metastatic renal cell carcinoma. Pt is currently taking cabozantinib daily since December 2016. Pt is on ESRD with HD every TTS. Patient tolerating treat extremely well with little to no side effects. She presented to ED 6/2022 with ongoing nosebleeds and has lot a lot of blood from this over the last few weeks. She was seen by ENT last week and has had no further bleeding. In the ED, she was found to be in atrial fibrillation with RVR.  Admitted with atrial fibrillation with RVR and weakness. After further history, patient has hx of recent blood loss via nose bleeding. Hb 7.6 --> given 1 unit pRBC and improvement to 8.9. Patient feeling much better and with more energy. Discussed start Eliquis for stroke prevention with Afib. Echo repeated, abnormal but still similar to previous echo in Feb 2021 with reduced EF, posterior pericardial effusion and pulm HTN  She has previously had nasal cauterization.  The bleeding required packing for control.   There is not a prior history of nasal surgery.  There is not a prior history of nasal trauma.  There is not a history of environmental allergies.  She does currently use a nasal spray Afrin.  There is not a family history to suggest a clotting disorder. Hx of ESRD on dialysis. Patient states that  nosebleeds have been more frequent since being on dialysis. Was seen in the ED 3 times in 1 week for right sided epistaxis. She was switched to warfarin. She reports nosebleeds less . She was taken off   warfarin and Re-started the eliquis 2.5 mg twice daily. She is here for further evaluation.     Past Medical History:   Diagnosis Date    Anemia     Bronchitis 03/01/2017    Cancer 2016    kidney cancer    CHF (congestive heart failure), NYHA class II, chronic, systolic     CMV (cytomegalovirus) antibody positive     Essential hypertension 9/23/2015    H/O herpes simplex type 2 infection     Herpes simplex type 1 antibody positive     History of kidney cancer     s/p left nephrectomy 1/2016    Hyperparathyroidism, unspecified     Hyperuricemia without signs of inflammatory arthritis and tophaceous disease     Kidney stones     LGSIL (low grade squamous intraepithelial dysplasia)     Myocardiopathy 7/21/2017    Prediabetes     Proteinuria     Renal disorder     Thyroid nodule     Urate nephropathy        Past Surgical History:   Procedure Laterality Date    BREAST CYST EXCISION      COLONOSCOPY N/A 11/12/2015    COLONOSCOPY N/A 3/12/2021    Procedure: COLONOSCOPY;  Surgeon: Brendon Lanier MD;  Location: [facility];  Service: Endoscopy;  Laterality: N/A;  covid test 3/9, labs prior, prep instr mailed -ml    INSERTION OF BIVENTRICULAR IMPLANTABLE CARDIOVERTER-DEFIBRILLATOR (ICD)  04/2021    NEPHRECTOMY-LAPAROSCOPIC Left 01/12/2016    PERITONEAL CATHETER INSERTION      Permacath insertion  01/12/2017    SALPINGOOPHORECTOMY Right 2016    KJB---DAVINCI    TONSILLECTOMY      TUBAL LIGATION          Review of Systems   Constitutional:  Positive for fatigue. Negative for appetite change, fever and unexpected weight change.   HENT:  Negative for mouth sores.    Eyes:  Negative for visual disturbance.   Respiratory:  Negative for cough and shortness of breath.    Cardiovascular:  Negative for chest pain.  "  Gastrointestinal:  Negative for abdominal pain and diarrhea.   Genitourinary:  Negative for frequency.   Musculoskeletal:  Negative for back pain.   Integumentary:  Negative for rash.   Neurological:  Negative for headaches.   Hematological:  Negative for adenopathy.   Psychiatric/Behavioral:  The patient is not nervous/anxious.        Objective:       Vitals:    09/21/22 0902   BP: 126/60   BP Location: Right arm   Patient Position: Sitting   BP Method: Large (Automatic)   Pulse: 69   Temp: 98.2 °F (36.8 °C)   TempSrc: Oral   SpO2: 98%   Weight: 54.4 kg (119 lb 14.9 oz)   Height: 5' 4" (1.626 m)       Physical Exam  Constitutional:       Appearance: She is well-developed.   HENT:      Head: Normocephalic.      Mouth/Throat:      Pharynx: No oropharyngeal exudate.   Eyes:      General: No scleral icterus.        Right eye: No discharge.         Left eye: No discharge.      Conjunctiva/sclera: Conjunctivae normal.   Cardiovascular:      Rate and Rhythm: Normal rate and regular rhythm.      Heart sounds: Murmur heard.   Pulmonary:      Effort: Pulmonary effort is normal.      Breath sounds: Normal breath sounds. No wheezing or rales.   Abdominal:      General: Bowel sounds are normal.      Palpations: Abdomen is soft.      Tenderness: There is no abdominal tenderness. There is no guarding or rebound.   Musculoskeletal:         General: Normal range of motion.      Cervical back: Normal range of motion and neck supple.      Right lower leg: No edema.      Left lower leg: No edema.   Skin:     Coloration: Skin is not jaundiced.      Findings: No rash.   Neurological:      General: No focal deficit present.      Mental Status: She is alert and oriented to person, place, and time.   Psychiatric:         Mood and Affect: Mood normal.         Behavior: Behavior normal.           Lab Results   Component Value Date    WBC 7.43 06/29/2022    HGB 8.9 (L) 06/29/2022    HCT 27.9 (L) 06/29/2022     (H) 06/29/2022    PLT " 186 06/29/2022        CT c/a/p w/out contrast  9/13/21  Impression:     There is no interval detrimental change compared to the MRI abdomen 12/28/2020 and chest CT 04/26/2020.     Status post the left none nephrectomy.     Atrophic right kidney with hypoattenuating lesions of the right kidney similar to the prior exam favoring cysts.     Cardiomegaly with pericardial effusion, unchanged.     Stable pulmonary nodules.     Stable splenic lesion, unclear etiology possibly small hemangioma.     Previously seen hepatic lesion and pancreatic lesions are not seen on this noncontrast CT study.     Ascites.     Cholelithiasis.     There are no measurable lesions per RECIST criteria.         Assessment:       Problem List Items Addressed This Visit    None  Visit Diagnoses       Anemia due to end stage renal disease    -  Primary    Anemia, unspecified type        Metastatic renal cell carcinoma, unspecified laterality                  Problem List Items Addressed This Visit    None  Visit Diagnoses       Anemia due to end stage renal disease    -  Primary    Anemia, unspecified type        Relevant Orders    Reticulocytes (Completed)    Protein Electrophoresis, Serum (Completed)    Immunofixation Electrophoresis (Completed)    Pathologist Interpretation Differential (Completed)    CBC Auto Differential (Completed)    Metastatic renal cell carcinoma, unspecified laterality      pt started on oral cabozantinib 60mg daily (for dual VEGF and MET inhibition) since Dec 2016  LOST TO FOLLOW-UP with Dr. Miguel Rivera made aware and plans close follow-up             Plan:             Patient lost to follow-up with Dr. Miguel Rivera's staff in process of contacting pt to schedule follow-up    Cbc, Fe studies, SPEP/ RUTHANN retic  today     Follow-up in 6 weeks with cbc only

## 2022-09-21 NOTE — Clinical Note
Patient lost to follow-up with Dr. Miguel Rivera's staff in process of contacting pt to schedule follow-up  Cbc, Fe studies, SPEP/ RUTHANN retic  today   Follow-up in 6 weeks with cbc only

## 2022-09-22 ENCOUNTER — TELEPHONE (OUTPATIENT)
Dept: FAMILY MEDICINE | Facility: CLINIC | Age: 57
End: 2022-09-22
Payer: MEDICARE

## 2022-09-22 ENCOUNTER — TELEPHONE (OUTPATIENT)
Dept: HEMATOLOGY/ONCOLOGY | Facility: CLINIC | Age: 57
End: 2022-09-22
Payer: MEDICARE

## 2022-09-22 ENCOUNTER — PATIENT MESSAGE (OUTPATIENT)
Dept: CARDIOLOGY | Facility: HOSPITAL | Age: 57
End: 2022-09-22
Payer: MEDICARE

## 2022-09-22 DIAGNOSIS — Z12.31 SCREENING MAMMOGRAM FOR BREAST CANCER: Primary | ICD-10-CM

## 2022-09-22 LAB
ALBUMIN SERPL ELPH-MCNC: 2.82 G/DL (ref 3.35–5.55)
ALPHA1 GLOB SERPL ELPH-MCNC: 0.31 G/DL (ref 0.17–0.41)
ALPHA2 GLOB SERPL ELPH-MCNC: 0.5 G/DL (ref 0.43–0.99)
B-GLOBULIN SERPL ELPH-MCNC: 0.54 G/DL (ref 0.5–1.1)
GAMMA GLOB SERPL ELPH-MCNC: 1.23 G/DL (ref 0.67–1.58)
INTERPRETATION SERPL IFE-IMP: NORMAL
PATHOLOGIST INTERPRETATION IFE: NORMAL
PROT SERPL-MCNC: 5.4 G/DL (ref 6–8.4)

## 2022-09-22 NOTE — TELEPHONE ENCOUNTER
----- Message from Jamila Weldon sent at 9/22/2022 10:48 AM CDT -----  .Type:  Mammogram    Caller is requesting to schedule their annual mammogram appointment.  Order is not listed in EPIC.  Please enter order and contact patient to schedule.  Name of Caller: self    Where would they like the mammogram performed? [facility]    Would the patient rather a call back or a response via [facility]? call    Best Call Back Number: .[phone] (home)

## 2022-09-22 NOTE — TELEPHONE ENCOUNTER
"----- Message from Allegra Carrasco sent at 9/22/2022 10:42 AM CDT -----  Scheduling Request                   Patient Status:  active    Scheduling Appt : EP    Time/Date Preference: first available    Violett Active User?: yes    Relationship to Patient?: self    Contact Preference?: [phone]]    Treating Provider:  Dr. Rivera    Do you feel you need to be seen today? no       Additional Notes:  "Thank you for all that you do for our patients'     "

## 2022-09-23 PROBLEM — D69.6 THROMBOCYTOPENIA: Status: RESOLVED | Noted: 2022-05-23 | Resolved: 2022-09-23

## 2022-09-23 LAB — PATHOLOGIST INTERPRETATION SPE: NORMAL

## 2022-09-24 ENCOUNTER — HOSPITAL ENCOUNTER (EMERGENCY)
Facility: HOSPITAL | Age: 57
Discharge: HOME | End: 2022-09-24
Attending: EMERGENCY MEDICINE
Payer: MEDICARE

## 2022-09-24 VITALS
WEIGHT: 119 LBS | SYSTOLIC BLOOD PRESSURE: 158 MMHG | RESPIRATION RATE: 23 BRPM | DIASTOLIC BLOOD PRESSURE: 73 MMHG | BODY MASS INDEX: 20.32 KG/M2 | OXYGEN SATURATION: 99 % | HEART RATE: 74 BPM | TEMPERATURE: 98 F | HEIGHT: 64 IN

## 2022-09-24 DIAGNOSIS — T82.838A BLEEDING FROM DIALYSIS SHUNT, INITIAL ENCOUNTER: ICD-10-CM

## 2022-09-24 DIAGNOSIS — Z78.9 PROBLEM WITH VASCULAR ACCESS: Primary | ICD-10-CM

## 2022-09-24 LAB
ABO + RH BLD: NORMAL
ALBUMIN SERPL BCP-MCNC: 3 G/DL (ref 3.5–5.2)
ALP SERPL-CCNC: 273 U/L (ref 55–135)
ALT SERPL W/O P-5'-P-CCNC: 24 U/L (ref 10–44)
ANION GAP SERPL CALC-SCNC: 13 MMOL/L (ref 8–16)
APTT BLDCRRT: 28.3 SEC (ref 21–32)
AST SERPL-CCNC: 43 U/L (ref 10–40)
BASOPHILS # BLD AUTO: 0.05 K/UL (ref 0–0.2)
BASOPHILS NFR BLD: 0.9 % (ref 0–1.9)
BILIRUB SERPL-MCNC: 1 MG/DL (ref 0.1–1)
BLD GP AB SCN CELLS X3 SERPL QL: NORMAL
BUN SERPL-MCNC: 27 MG/DL (ref 6–20)
CALCIUM SERPL-MCNC: 8.6 MG/DL (ref 8.7–10.5)
CHLORIDE SERPL-SCNC: 100 MMOL/L (ref 95–110)
CO2 SERPL-SCNC: 27 MMOL/L (ref 23–29)
CREAT SERPL-MCNC: 3.2 MG/DL (ref 0.5–1.4)
DIFFERENTIAL METHOD: ABNORMAL
EOSINOPHIL # BLD AUTO: 0.1 K/UL (ref 0–0.5)
EOSINOPHIL NFR BLD: 2.3 % (ref 0–8)
ERYTHROCYTE [DISTWIDTH] IN BLOOD BY AUTOMATED COUNT: 20.3 % (ref 11.5–14.5)
EST. GFR  (NO RACE VARIABLE): 16 ML/MIN/1.73 M^2
GLUCOSE SERPL-MCNC: 82 MG/DL (ref 70–110)
HCT VFR BLD AUTO: 24 % (ref 37–48.5)
HGB BLD-MCNC: 7.9 G/DL (ref 12–16)
IMM GRANULOCYTES # BLD AUTO: 0.01 K/UL (ref 0–0.04)
IMM GRANULOCYTES NFR BLD AUTO: 0.2 % (ref 0–0.5)
INR PPP: 1.1 (ref 0.8–1.2)
LYMPHOCYTES # BLD AUTO: 1.6 K/UL (ref 1–4.8)
LYMPHOCYTES NFR BLD: 29.2 % (ref 18–48)
MCH RBC QN AUTO: 33.9 PG (ref 27–31)
MCHC RBC AUTO-ENTMCNC: 32.9 G/DL (ref 32–36)
MCV RBC AUTO: 103 FL (ref 82–98)
MONOCYTES # BLD AUTO: 0.6 K/UL (ref 0.3–1)
MONOCYTES NFR BLD: 11.2 % (ref 4–15)
NEUTROPHILS # BLD AUTO: 3.2 K/UL (ref 1.8–7.7)
NEUTROPHILS NFR BLD: 56.2 % (ref 38–73)
NRBC BLD-RTO: 0 /100 WBC
PLATELET # BLD AUTO: 182 K/UL (ref 150–450)
PMV BLD AUTO: 10.1 FL (ref 9.2–12.9)
POTASSIUM SERPL-SCNC: 3.3 MMOL/L (ref 3.5–5.1)
PROT SERPL-MCNC: 6.7 G/DL (ref 6–8.4)
PROTHROMBIN TIME: 12.2 SEC (ref 9–12.5)
RBC # BLD AUTO: 2.33 M/UL (ref 4–5.4)
SODIUM SERPL-SCNC: 140 MMOL/L (ref 136–145)
WBC # BLD AUTO: 5.62 K/UL (ref 3.9–12.7)

## 2022-09-24 PROCEDURE — 85025 COMPLETE CBC W/AUTO DIFF WBC: CPT | Performed by: EMERGENCY MEDICINE

## 2022-09-24 PROCEDURE — 80053 COMPREHEN METABOLIC PANEL: CPT | Performed by: EMERGENCY MEDICINE

## 2022-09-24 PROCEDURE — 85610 PROTHROMBIN TIME: CPT | Performed by: EMERGENCY MEDICINE

## 2022-09-24 PROCEDURE — 12001 RPR S/N/AX/GEN/TRNK 2.5CM/<: CPT | Mod: LT

## 2022-09-24 PROCEDURE — 85730 THROMBOPLASTIN TIME PARTIAL: CPT | Performed by: EMERGENCY MEDICINE

## 2022-09-24 PROCEDURE — 86901 BLOOD TYPING SEROLOGIC RH(D): CPT | Performed by: EMERGENCY MEDICINE

## 2022-09-24 PROCEDURE — 99283 EMERGENCY DEPT VISIT LOW MDM: CPT | Mod: 25

## 2022-09-24 NOTE — DISCHARGE INSTRUCTIONS
Please contact your vascular surgeon and nephrologist regarding re-evaluation of your dialysis access.    Thank you for coming to our Emergency Department today. It is important to remember that some problems are difficult to diagnose and may not be found during your first visit. Be sure to follow up with your primary care doctor and review any labs/imaging that was performed with them. If you do not have a primary care doctor, you may contact the one listed on your discharge paperwork or you may also call the [facility] Appointment Desk at [phone] to schedule an appointment with one.     All medications may potentially have side effects and it is impossible to predict which medications may give you side effects. If you feel that you are having a negative effect of any medication you should immediately stop taking them and seek medical attention.    Return to the ER with any questions/concerns, new/concerning symptoms, worsening or failure to improve. Do not drive or make any important decisions for 24 hours if you have received any pain medications, sedatives or mood altering drugs during your ER visit.

## 2022-09-24 NOTE — ED PROVIDER NOTES
Encounter Date: 9/24/2022    SCRIBE #1 NOTE: I, Lexus Carolina, am scribing for, and in the presence of,  Michael Torres MD.     History     Chief Complaint   Patient presents with    Vascular Access Problem     EMS called to 57yo female that was at dialysis and received her full treatment but then they could not get her port to stop bleeding for about an hour. Clamp was placed PTA of EMS and has been controlled since.      56 year old female, with pertinent medical history of anemia, kidney cancer, CHF, HTN, and renal disorder, presents to the ED via EMS to evaluate her AV fistula bleeding that began 1 hour PTA. Patient states she was at dialysis and received her full session but her port would not stop bleeding. Patient arrived to the ED with 2 bandages in place, one each for her upper and lower fistula. The lower fistula was clamped in place to control the bleeding by EMS but continues to bleed if without intervention. Patient reports the upper fistula is no longer bleeding. The upper fistula had 3 puncture wounds present and it is unknown which wound had the bleeding. Patient endorses blood thinner Eliquis use. Last taken yesterday. No other exacerbating or alleviating factors. No other associated symptoms.      The history is provided by the patient. No  was used.   Review of patient's allergies indicates:   Allergen Reactions    Coreg [carvedilol] Other (See Comments)     Nausea/vomiting    Allopurinol      Other reaction(s): abnormal transaminases     Past Medical History:   Diagnosis Date    Anemia     Bronchitis 03/01/2017    Cancer 2016    kidney cancer    CHF (congestive heart failure), NYHA class II, chronic, systolic     CMV (cytomegalovirus) antibody positive     Essential hypertension 9/23/2015    H/O herpes simplex type 2 infection     Herpes simplex type 1 antibody positive     History of kidney cancer     s/p left nephrectomy 1/2016    Hyperparathyroidism, unspecified      Hyperuricemia without signs of inflammatory arthritis and tophaceous disease     Kidney stones     LGSIL (low grade squamous intraepithelial dysplasia)     Myocardiopathy 7/21/2017    Prediabetes     Proteinuria     Renal disorder     Thyroid nodule     Urate nephropathy      Past Surgical History:   Procedure Laterality Date    BREAST CYST EXCISION      COLONOSCOPY N/A 11/12/2015    COLONOSCOPY N/A 3/12/2021    Procedure: COLONOSCOPY;  Surgeon: Brendon Lanier MD;  Location: [facility];  Service: Endoscopy;  Laterality: N/A;  covid test 3/9, labs prior, prep instr mailed -ml    INSERTION OF BIVENTRICULAR IMPLANTABLE CARDIOVERTER-DEFIBRILLATOR (ICD)  04/2021    NEPHRECTOMY-LAPAROSCOPIC Left 01/12/2016    PERITONEAL CATHETER INSERTION      Permacath insertion  01/12/2017    SALPINGOOPHORECTOMY Right 2016    KJB---DAVINCI    TONSILLECTOMY      TUBAL LIGATION       Family History   Problem Relation Age of Onset    Hypertension Mother     Diabetes Father     Kidney disease Father     No Known Problems Son     No Known Problems Son     Diabetes Maternal Grandfather     No Known Problems Sister     No Known Problems Brother     No Known Problems Maternal Grandmother     No Known Problems Paternal Grandmother     No Known Problems Paternal Grandfather     Heart disease Sister     No Known Problems Sister     No Known Problems Brother     No Known Problems Maternal Aunt     No Known Problems Maternal Uncle     No Known Problems Paternal Aunt     No Known Problems Paternal Uncle     Breast cancer Neg Hx     Colon cancer Neg Hx     Cancer Neg Hx     Stroke Neg Hx     Amblyopia Neg Hx     Blindness Neg Hx     Cataracts Neg Hx     Glaucoma Neg Hx     Macular degeneration Neg Hx     Retinal detachment Neg Hx     Strabismus Neg Hx     Thyroid disease Neg Hx      Social History     Tobacco Use    Smoking status: Never    Smokeless tobacco: Never   Substance Use Topics    Alcohol use: No     Comment: . 2 children. works  at [employer].    Drug use: No     Review of Systems   Constitutional:  Negative for chills and fatigue.   HENT:  Negative for congestion and sore throat.    Eyes:  Negative for pain and visual disturbance.   Respiratory:  Negative for chest tightness and shortness of breath.    Cardiovascular:  Negative for chest pain.   Gastrointestinal:  Negative for nausea.   Endocrine: Negative for polydipsia and polyuria.   Genitourinary:  Negative for dysuria and flank pain.   Musculoskeletal:  Negative for back pain, neck pain and neck stiffness.   Skin:  Negative for rash.        (+) AV fistula active bleeding   Allergic/Immunologic: Negative for immunocompromised state.   Neurological:  Negative for dizziness and weakness.   Hematological:  Does not bruise/bleed easily.   Psychiatric/Behavioral:  Negative for agitation and behavioral problems.    All other systems reviewed and are negative.    Physical Exam     Initial Vitals [09/24/22 1051]   BP Pulse Resp Temp SpO2   (!) 158/73 77 16 97.9 °F (36.6 °C) 97 %      MAP       --         Physical Exam    Nursing note and vitals reviewed.  Constitutional: She appears well-developed and well-nourished.   HENT:   Head: Normocephalic.   Right Ear: External ear normal.   Left Ear: External ear normal.   Mouth/Throat: Oropharynx is clear and moist.   Eyes: EOM are normal. Pupils are equal, round, and reactive to light.   Neck:   Normal range of motion.  Cardiovascular:  Normal rate and regular rhythm.           Pulmonary/Chest: Breath sounds normal. No respiratory distress. She has no wheezes.   Abdominal: Abdomen is soft. Bowel sounds are normal. She exhibits no distension. There is no abdominal tenderness.   Musculoskeletal:         General: No tenderness or edema. Normal range of motion.      Cervical back: Normal range of motion.     Neurological: She is alert and oriented to person, place, and time. She has normal strength. GCS score is 15. GCS eye subscore is 4. GCS verbal  subscore is 5. GCS motor subscore is 6.   Skin: Skin is warm and dry. Capillary refill takes less than 2 seconds.   Small pulsatile bleeding from her fistula in the left upper extremity   Psychiatric: She has a normal mood and affect. Thought content normal.       ED Course   Lac Repair    Date/Time: 9/24/2022 4:13 PM  Performed by: Michael Torres MD  Authorized by: Michael Torres MD     Consent:     Consent obtained:  Verbal    Consent given by:  Patient    Risks discussed:  Infection and vascular damage    Alternatives discussed:  No treatment  Universal protocol:     Patient identity confirmed:  Verbally with patient  Anesthesia:     Anesthesia method:  None  Laceration details:     Location:  Shoulder/arm    Shoulder/arm location:  L upper arm    Length (cm):  0.5  Exploration:     Limited defect created (wound extended): no      Hemostasis achieved with:  Direct pressure    Contaminated: no    Skin repair:     Repair method:  Sutures    Suture size:  3-0    Suture material:  Nylon (Nonabsorbable)    Suture technique:  Figure eight    Number of sutures:  1  Repair type:     Repair type:  Intermediate  Post-procedure details:     Dressing:  Non-adherent dressing    Procedure completion:  Tolerated  Labs Reviewed   COMPREHENSIVE METABOLIC PANEL - Abnormal; Notable for the following components:       Result Value    Potassium 3.3 (*)     BUN 27 (*)     Creatinine 3.2 (*)     Calcium 8.6 (*)     Albumin 3.0 (*)     Alkaline Phosphatase 273 (*)     AST 43 (*)     eGFR 16 (*)     All other components within normal limits   CBC W/ AUTO DIFFERENTIAL - Abnormal; Notable for the following components:    RBC 2.33 (*)     Hemoglobin 7.9 (*)     Hematocrit 24.0 (*)      (*)     MCH 33.9 (*)     RDW 20.3 (*)     All other components within normal limits   APTT   PROTIME-INR   TYPE & SCREEN          Imaging Results    None          Medications - No data to display  Medical Decision Making:   History:   Old Medical  Records: I decided to obtain old medical records.  Old Records Summarized: other records.       <> Summary of Records: Patient has no ED visits in the past year with similar symptoms. Patient has recurring epistaxis symptoms, treated to prevent persistent bleeding, and discharged stably with nasal sprays and follow-ups with PCP.  Initial Assessment:   56-year-old female presenting secondary to pulsatile bleeding from AV fistula.  I cannot get bleeding to stop with direct pressure and other interventions.  Placed a figure-eight.  Get bleeding to stop.  Still having thrill and bruit.  Secure chest Message sent to vascular surgery.  Patient to call their vascular surgeon and also nephrologist to discuss further dialysis.  Labs reassuring. I discussed with the patient/family the diagnosis, treatment plan, indications for return to the emergency department, and for expected follow-up. The patient/family verbalized an understanding. The patient/family is asked if there are any questions or concerns. We discuss the case, until all issues are addressed to the patient/family's satisfaction. Patient/family understands and is agreeable to the plan.   Michael Torres          Please put in 35 minutes of critical care due to patient having a high risk of hematological failure.   Separate from teaching and exclusive of procedure and ekg time  Includes:  Time at bedside  Time reviewing test results  Time discussing case with staff  Time documenting the medical record  Time spent with family members  Time spent with consults  Management    Clinical Tests:   Lab Tests: Ordered and Reviewed        Scribe Attestation:   Scribe #1: I performed the above scribed service and the documentation accurately describes the services I performed. I attest to the accuracy of the note.                   Clinical Impression:   Final diagnoses:  [Z78.9] Problem with vascular access (Primary)  [T82.232L] Bleeding from dialysis shunt, initial encounter       ED Disposition Condition    Discharge Stable          ED Prescriptions    None       Follow-up Information       Follow up With Specialties Details Why Contact Info    Sowmya Mccain MD Internal Medicine, Pediatrics Schedule an appointment as soon as possible for a visit in 2 days  [address] [facility] [place] [id number] [phone]          I, michael torres, personally performed the services described in this documentation. All medical record entries made by the scribe were at my direction and in my presence. I have reviewed the chart and agree that the record reflects my personal performance and is accurate and complete.      Michael Torres MD  09/24/22 [id number]

## 2022-09-26 ENCOUNTER — TELEPHONE (OUTPATIENT)
Dept: FAMILY MEDICINE | Facility: CLINIC | Age: 57
End: 2022-09-26
Payer: MEDICARE

## 2022-09-26 ENCOUNTER — TELEPHONE (OUTPATIENT)
Dept: VASCULAR SURGERY | Facility: CLINIC | Age: 57
End: 2022-09-26
Payer: MEDICARE

## 2022-09-26 DIAGNOSIS — N18.6 ESRD (END STAGE RENAL DISEASE) ON DIALYSIS: Primary | ICD-10-CM

## 2022-09-26 DIAGNOSIS — Z99.2 ESRD (END STAGE RENAL DISEASE) ON DIALYSIS: Primary | ICD-10-CM

## 2022-09-26 NOTE — TELEPHONE ENCOUNTER
I spoke with patient to let her know that Dr Mccain did see her ed visit report and that she should see her vascular ceci on tomorrow. She verbally understood the message.

## 2022-09-26 NOTE — TELEPHONE ENCOUNTER
----- Message from Kaylin Huerta sent at 9/26/2022  7:10 AM CDT -----  Type: Patient Call Back    Who called: Self     What is the request in detail: patient says she was told by Dr. Mccain to see her by 9/26    Can the clinic reply by [facility]? No     Would the patient rather a call back or a response via [facility]? Call     Best call back number:[phone]

## 2022-09-26 NOTE — TELEPHONE ENCOUNTER
Spoke with patient she states that she went to the ER and had some vascular issues and shunt issues. She states that she wanted to see you, visit scheduled with NP on 09/28, patient is asking can you please read her ER notes.

## 2022-09-26 NOTE — TELEPHONE ENCOUNTER
Contacted patient in response to message. Pt explained that she was experiencing prolonged bleeding and Saturday 9/24/22 and reported to ED at [facility]. States sutures were placed in AVF and she was instructed to report to vascular surgery clinic for FU before she can resume HD on Tuesday 9/27/22. Appointment scheduled, pt verified. ----- Message from Gerber Blankenship sent at 9/26/2022  8:08 AM CDT -----  Contact: [phone]  Pt is calling to get scheduled for a appt. Needs to marbin appt for today --- needs to be seen in order to go to next dialysis appt   Pt access tried but no appts available.  Pt would like a call back. [phone]

## 2022-09-26 NOTE — TELEPHONE ENCOUNTER
----- Message from Jenna Arellano sent at 9/26/2022 10:07 AM CDT -----  Regarding: Return Call  Who Called: MANNIE POWERS [[id number]]         Who Left Message for Patient: Donna         Does the patient know what this is regarding? Yes         Best Call Back Number:[phone]         Additional Information:

## 2022-09-27 ENCOUNTER — HOSPITAL ENCOUNTER (OUTPATIENT)
Dept: VASCULAR SURGERY | Facility: CLINIC | Age: 57
Discharge: HOME | End: 2022-09-27
Attending: SURGERY
Payer: MEDICARE

## 2022-09-27 ENCOUNTER — TELEPHONE (OUTPATIENT)
Dept: SURGERY | Facility: CLINIC | Age: 57
End: 2022-09-27
Payer: MEDICARE

## 2022-09-27 ENCOUNTER — OFFICE VISIT (OUTPATIENT)
Dept: VASCULAR SURGERY | Facility: CLINIC | Age: 57
End: 2022-09-27
Payer: MEDICARE

## 2022-09-27 VITALS
SYSTOLIC BLOOD PRESSURE: 143 MMHG | BODY MASS INDEX: 19.96 KG/M2 | HEIGHT: 64 IN | HEART RATE: 65 BPM | TEMPERATURE: 98 F | DIASTOLIC BLOOD PRESSURE: 63 MMHG | WEIGHT: 116.88 LBS

## 2022-09-27 DIAGNOSIS — Z99.2 ESRD (END STAGE RENAL DISEASE) ON DIALYSIS: ICD-10-CM

## 2022-09-27 DIAGNOSIS — N18.6 ESRD (END STAGE RENAL DISEASE) ON DIALYSIS: ICD-10-CM

## 2022-09-27 DIAGNOSIS — N18.6 ESRD (END STAGE RENAL DISEASE) ON DIALYSIS: Primary | ICD-10-CM

## 2022-09-27 DIAGNOSIS — Z99.2 ESRD (END STAGE RENAL DISEASE) ON DIALYSIS: Primary | ICD-10-CM

## 2022-09-27 PROCEDURE — 93990 DOPPLER FLOW TESTING: CPT | Mod: PBBFAC | Performed by: SURGERY

## 2022-09-27 PROCEDURE — 99999 PR PBB SHADOW E&M-EST. PATIENT-LVL IV: CPT | Mod: PBBFAC,,, | Performed by: SURGERY

## 2022-09-27 PROCEDURE — 99213 PR OFFICE/OUTPT VISIT, EST, LEVL III, 20-29 MIN: ICD-10-PCS | Mod: S$PBB,,, | Performed by: SURGERY

## 2022-09-27 PROCEDURE — 93990 DOPPLER FLOW TESTING: CPT | Mod: 26,S$PBB,, | Performed by: SURGERY

## 2022-09-27 PROCEDURE — 99999 PR PBB SHADOW E&M-EST. PATIENT-LVL IV: ICD-10-PCS | Mod: PBBFAC,,, | Performed by: SURGERY

## 2022-09-27 PROCEDURE — 93990 PR DUPLEX HEMODIALYSIS ACCESS: ICD-10-PCS | Mod: 26,S$PBB,, | Performed by: SURGERY

## 2022-09-27 PROCEDURE — 99214 OFFICE O/P EST MOD 30 MIN: CPT | Mod: PBBFAC | Performed by: SURGERY

## 2022-09-27 PROCEDURE — 99213 OFFICE O/P EST LOW 20 MIN: CPT | Mod: S$PBB,,, | Performed by: SURGERY

## 2022-09-27 NOTE — PROGRESS NOTES
HISTORY OF PRESENT ILLNESS:  A 56 y.o. female with end-stage renal disease,   well known to Dr. Muñoz, status post,  1.  Covered stent placement, left AV fistula outflow tract, 03/06/2017.  2.  Left upper arm 4-7 tapered AV graft on 01/12/2017.    Of note, at the time of surgery was found to have an exceptionally thin and   friable 4 mm brachial vein, which she used outflow and which she returned with   the stenosis that was treated empirically with a Covered stent because of the   risk of rupture.    12/3/2021:  This 6 month follow-up.  She continues to have no trouble using the graft.    9/27/2022:  3 days ago she had bleeding from a cannulation site the required a figure-of-eight stitch.  Otherwise she has had no issues whatsoever since I last saw her 9 months ago.    PHYSICAL EXAMINATION:  VITAL SIGNS:  See nursing note.  EXTREMITIES:  Left arm demonstrates a upper arm graft with an excellent thrill no significant pulsatility.  Mid section is moderately aneurysmal.  See picture While there is some alopecia the skin is not thinned.  There is no ulceration          IMAGING:  Duplex of the AV fistula show no significant outflow stenosis and a robust flow volume of 3.5 (prior 3.8 L      ASSESSMENT:  Well-functioning left upper arm AV graft with Viabahn stent graft outflow treatment, near 5 years of cumulative patency with only a single intervention    There is mild aneurysmal degeneration, but no marked skin thinning, no ulceration.  Told the patient and family that at some point this will need a body revision, but I do not feel that is required at this time    PLAN:    -6 month follow up for graft surveillance.  Continue to dialyze as normal. Instructed to follow up should she develop s/sx of       MANPREET Muñoz III, MD, FACS  Professor and Chief, Vascular and Endovascular Surgery

## 2022-09-30 ENCOUNTER — PATIENT MESSAGE (OUTPATIENT)
Dept: FAMILY MEDICINE | Facility: CLINIC | Age: 57
End: 2022-09-30
Payer: MEDICARE

## 2022-10-03 DIAGNOSIS — Z71.89 COMPLEX CARE COORDINATION: ICD-10-CM

## 2022-10-05 NOTE — PROGRESS NOTES
"Subjective:       Patient ID: Eva Bloom is a 57 y.o. female.    Chief Complaint: RCC    HPI     57 y.o.female, with metastatic RCC, loss to follow up for a year, but remarkablly outside speciality pharmacy continued sending her cabo.  Pt is currently taking cabozantinib daily since December 2016. Pt is on ESRD with HD every TTS. Patient tolerating treat extremely well with little to no side effects.     Today, she denies any mouth sores, nausea, vomiting, diarrhea, constipation,weight loss, chest pain, leg swelling, headache, dizziness, or mood changes.    ECOG 1. She is accompanied with her .    Oncologic History (From Chart and Patient):  Eva Bloom is a 57 y.o.female with ESRD on HD who was found to have two L renal masses. She underwent L lap nephrectomy on 1/12/16. 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 Margins were negative.  Shehealed from surgery well, but then developed a large ovarian mass.  This was removed by gyn along with multiple sites of metastatic disease in the pelvis.  Path was c/w recurrence of RCC.     11/20/16 Pelvic ultrasound reveals "Large heterogeneous cystic and solid mass which appears to arise from the right ovary with worrisome imaging features for malignancy.  Differential diagnosis includes malignant tumors such as serous or mucinous cystadenocarcinoma.  It is important to note that the patient is at risk for ovarian torsion due to the size of the mass.Multiple uterine fibroids are identified with the largest in the uterine fundus."    11/22/16 pathology reveals "FINAL PATHOLOGIC DIAGNOSIS-RIGHT OVARY AND FALLOPIAN TUBE, RIGHT SALPINGO-OOPHORECTOMY:  -Positive for malignancy, high grade carcinoma morphologically and immunohistochemically consistent with metastasis from the patient's known renal cell carcinoma"    Review of Systems "   Constitutional:  Positive for fatigue. Negative for activity change, appetite change, chills and fever.   HENT:  Negative for congestion, hearing loss, mouth sores, postnasal drip, sore throat, tinnitus and voice change.    Eyes:  Negative for pain and visual disturbance.   Respiratory:  Negative for cough, shortness of breath and wheezing.    Cardiovascular:  Negative for chest pain, palpitations and leg swelling.   Gastrointestinal:  Negative for abdominal pain, constipation, diarrhea, nausea and vomiting.   Endocrine: Negative for cold intolerance and heat intolerance.   Genitourinary:  Negative for difficulty urinating, dyspareunia, dysuria, frequency, menstrual problem, urgency, vaginal bleeding, vaginal discharge and vaginal pain.   Musculoskeletal:  Negative for arthralgias and myalgias.   Skin:  Negative for color change, rash and wound.   Allergic/Immunologic: Negative for environmental allergies and food allergies.   Neurological:  Negative for weakness, numbness and headaches.   Hematological:  Negative for adenopathy. Does not bruise/bleed easily.   Psychiatric/Behavioral:  Negative for agitation, confusion, hallucinations and sleep disturbance. The patient is not nervous/anxious.    All other systems reviewed and are negative.    Allergies:  Review of patient's allergies indicates:   Allergen Reactions    Allopurinol      Other reaction(s): abnormal transaminases       Medications:  Current Outpatient Medications   Medication Sig Dispense Refill    acetaminophen (TYLENOL) 500 MG tablet Take 500 mg by mouth every 6 (six) hours as needed for Pain.      apixaban (ELIQUIS) 2.5 mg Tab Take 1 tablet (2.5 mg total) by mouth 2 (two) times daily. 60 tablet 11    CABOMETYX 60 mg Tab TAKE ONE TABLET BY MOUTH ONCE DAILY AT THE SAME TIME ON AN EMPTY STOMACH AT LEAST 1 HOUR BEFORE OR 2 HOURS AFTER EATING. AVOID GRAPEFRUIT PRODUCTS 30 tablet 4    cloNIDine 0.3 mg/24 hr td ptwk (CATAPRES) 0.3 mg/24 hr Place 1 patch  onto the skin every 7 days. 4 patch 11    epoetin david-epbx (RETACRIT INJ) Epoetin david - epbx (Retacrit)      hydrALAZINE (APRESOLINE) 100 MG tablet Take 1 tablet (100 mg total) by mouth every 12 (twelve) hours. 60 tablet 2    lidocaine-prilocaine (EMLA) cream APPLY ATLEAST 30 MINUTES BEFORE TREATMENT 3 TIMES A WEEK 30 g 11    metoprolol succinate (TOPROL-XL) 100 MG 24 hr tablet Take 1 tablet (100 mg total) by mouth once daily. 90 tablet 0    mv,Ca,min-folic acid-vit K1 (ONE-A-DAY WOMEN'S 50 PLUS) 400-20 mcg Tab Take 1 tablet by mouth.      sacubitriL-valsartan (ENTRESTO) 49-51 mg per tablet Take 1 tablet by mouth 2 (two) times daily. 60 tablet 11    sevelamer carbonate (RENVELA) 800 mg Tab TAKE 2 TABLETS (1,600 MG TOTAL) BY MOUTH 3 (THREE) TIMES DAILY WITH MEALS. 540 tablet 0    sodium chloride 0.9% SolP 100 mL with iron sucrose 100 mg iron/5 mL Soln 100 mg 50 mg.       No current facility-administered medications for this visit.       PMH:  Past Medical History:   Diagnosis Date    Anemia     Bronchitis 03/01/2017    Cancer 2016    kidney cancer    CHF (congestive heart failure), NYHA class II, chronic, systolic     CMV (cytomegalovirus) antibody positive     Essential hypertension 9/23/2015    H/O herpes simplex type 2 infection     Herpes simplex type 1 antibody positive     History of kidney cancer     s/p left nephrectomy 1/2016    Hyperparathyroidism, unspecified     Hyperuricemia without signs of inflammatory arthritis and tophaceous disease     Kidney stones     LGSIL (low grade squamous intraepithelial dysplasia)     Myocardiopathy 7/21/2017    Prediabetes     Proteinuria     Renal disorder     Thyroid nodule     Urate nephropathy        PSH:  Past Surgical History:   Procedure Laterality Date    BREAST CYST EXCISION      COLONOSCOPY N/A 11/12/2015    COLONOSCOPY N/A 3/12/2021    Procedure: COLONOSCOPY;  Surgeon: Brendon Lanier MD;  Location: [facility];  Service: Endoscopy;  Laterality: N/A;  covid  test 3/9, labs prior, prep instr mailed -ml    INSERTION OF BIVENTRICULAR IMPLANTABLE CARDIOVERTER-DEFIBRILLATOR (ICD)  04/2021    NEPHRECTOMY-LAPAROSCOPIC Left 01/12/2016    PERITONEAL CATHETER INSERTION      Permacath insertion  01/12/2017    SALPINGOOPHORECTOMY Right 2016    KJB---DAVINCI    TONSILLECTOMY      TUBAL LIGATION         FamHx:  Family History   Problem Relation Age of Onset    Hypertension Mother     Diabetes Father     Kidney disease Father     No Known Problems Son     No Known Problems Son     Diabetes Maternal Grandfather     No Known Problems Sister     No Known Problems Brother     No Known Problems Maternal Grandmother     No Known Problems Paternal Grandmother     No Known Problems Paternal Grandfather     Heart disease Sister     No Known Problems Sister     No Known Problems Brother     No Known Problems Maternal Aunt     No Known Problems Maternal Uncle     No Known Problems Paternal Aunt     No Known Problems Paternal Uncle     Breast cancer Neg Hx     Colon cancer Neg Hx     Cancer Neg Hx     Stroke Neg Hx     Amblyopia Neg Hx     Blindness Neg Hx     Cataracts Neg Hx     Glaucoma Neg Hx     Macular degeneration Neg Hx     Retinal detachment Neg Hx     Strabismus Neg Hx     Thyroid disease Neg Hx        SocHx:  Social History     Socioeconomic History    Marital status:     Number of children: 2   Occupational History    Occupation: [employer]     Employer: [id number]   Tobacco Use    Smoking status: Never    Smokeless tobacco: Never   Substance and Sexual Activity    Alcohol use: No     Comment: . 2 children. works at [employer].    Drug use: No    Sexual activity: Yes     Partners: Male     Social Determinants of Health     Financial Resource Strain: Low Risk     Difficulty of Paying Living Expenses: Not hard at all   Food Insecurity: No Food Insecurity    Worried About Running Out of Food in the Last Year: Never true    Ran Out of Food in the Last Year: Never true    Transportation Needs: No Transportation Needs    Lack of Transportation (Medical): No    Lack of Transportation (Non-Medical): No   Physical Activity: Insufficiently Active    Days of Exercise per Week: 3 days    Minutes of Exercise per Session: 30 min   Stress: No Stress Concern Present    Feeling of Stress : Not at all   Social Connections: Socially Integrated    Frequency of Communication with Friends and Family: More than three times a week    Frequency of Social Gatherings with Friends and Family: Once a week    Attends Caoda[employer] Services: 1 to 4 times per year    Active Member of Clubs or Organizations: Yes    Attends Club or Organization Meetings: 1 to 4 times per year    Marital Status:    Housing Stability: Low Risk     Unable to Pay for Housing in the Last Year: No    Number of Places Lived in the Last Year: 1    Unstable Housing in the Last Year: No         Objective:     Vitals:    10/06/22 1258   BP: (!) 176/81   Pulse: 87   Resp: 18   Temp: 98.2 °F (36.8 °C)     Physical Exam  Vitals and nursing note reviewed.   Constitutional:       General: She is not in acute distress.     Appearance: She is well-developed.      Comments: Thin appearance   HENT:      Head: Normocephalic and atraumatic.      Nose: Nose normal.      Mouth/Throat:      Pharynx: No oropharyngeal exudate.   Eyes:      General:         Right eye: No discharge.         Left eye: No discharge.      Conjunctiva/sclera: Conjunctivae normal.      Pupils: Pupils are equal, round, and reactive to light.   Neck:      Trachea: No tracheal deviation.   Cardiovascular:      Rate and Rhythm: Normal rate and regular rhythm.      Pulses: Normal pulses.      Heart sounds: Murmur heard.      Comments: No swelling to bilateral lower extremities.   Pulmonary:      Effort: Pulmonary effort is normal.      Breath sounds: Normal breath sounds. No wheezing or rales.   Abdominal:      General: Bowel sounds are normal. There is no distension.       Palpations: Abdomen is soft.      Tenderness: There is no abdominal tenderness. There is no guarding.   Musculoskeletal:         General: No tenderness. Normal range of motion.      Cervical back: Normal range of motion and neck supple.      Comments:      Lymphadenopathy:      Cervical: No cervical adenopathy.   Skin:     General: Skin is warm and dry.      Coloration: Skin is not pale.      Findings: No erythema or rash.   Neurological:      Mental Status: She is alert and oriented to person, place, and time.   Psychiatric:         Behavior: Behavior normal.         Thought Content: Thought content normal.      LABS:  WBC   Date Value Ref Range Status   09/24/2022 5.62 3.90 - 12.70 K/uL Final     Hemoglobin   Date Value Ref Range Status   09/24/2022 7.9 (L) 12.0 - 16.0 g/dL Final     Hematocrit   Date Value Ref Range Status   09/24/2022 24.0 (L) 37.0 - 48.5 % Final     Platelets   Date Value Ref Range Status   09/24/2022 182 150 - 450 K/uL Final       Chemistry        Component Value Date/Time     09/24/2022 1151    K 3.3 (L) 09/24/2022 1151     09/24/2022 1151    CO2 27 09/24/2022 1151    BUN 27 (H) 09/24/2022 1151    CREATININE 3.2 (H) 09/24/2022 1151    GLU 82 09/24/2022 1151        Component Value Date/Time    CALCIUM 8.6 (L) 09/24/2022 1151    ALKPHOS 273 (H) 09/24/2022 1151    AST 43 (H) 09/24/2022 1151    ALT 24 09/24/2022 1151    BILITOT 1.0 09/24/2022 1151        VAS US Hemodialysis Access  Indication  ========    End Stage Renal Disease on Dialysis    History  ======    General History  Other:    Dialysis Access  =============    Dialysis access location:  Left Arm  AV graft:  yes  Lt inflow A PSV    160 cm/s  Lt at anastomosis PSV  580 cm/s  Lt graft inflow A PSV  667 cm/s  Lt graft mid PSV   254 cm/s  Lt graft mid flow volume   3,498.0 ml/min  Lt graft outflow V  cm/s  Lt V anastomosis PSV   228 cm/s  Lt outflow V prox PSV  225 cm/s  Lt outflow V mid PSV   251 cm/s  Lt outflow V  distal PSV    196 cm/s    Impression  =========    Color flow duplex exam reveals a patent and heavily calcified left upper extremity AV graft. There are elevated PSV's in the proximal  AVF measuring 667 cm/s which is suggestive of a focal hemodynamically significant stenosis. Volume flow at mid graft level  measures 3498 ml/min.    DATE OF SERVICE: 09/27/2022                                                      Sonographer: LEIF GANNON RVS  Electronically Signed by: FARIDEH Muñoz M.D. at 09/27/2022-16:40    Assessment:       1. Metastatic renal cell carcinoma, unspecified laterality    2. Metastatic renal cell carcinoma to intra-abdominal site    3. ESRD (end stage renal disease) on dialysis    4. Metastatic renal cell carcinoma, left          Plan:       Metastatic Renal Cell Carcinoma:    It is unlikely that surgery removed all sites of metastatic disease. Given that this is c/w her previous papillary disease, pt started on oral cabozantinib 60mg daily (for dual VEGF and MET inhibition), especially given recent data that shows cabo is far superior to student in the first line setting.  If pt progresses on this regimen, will switch to nivolumab.     Restaging scans last year showed stable disease and pt tolerating cabozantinib very well.   Overdue for restaging.      RTC in 4 weeks with CT CAP, and labs (CBC,CMP, TSH) and to see me on Monday, Wednesday ONLY (pt has dialysis on Tuesday and Thursday).      Patient is in agreement with the proposed treatment plan. All questions were answered to the patient's satisfaction. Pt knows to call clinic if anything is needed before the next clinic visit.     More than 40 mins total time were spent during this encounter.      Ben Rivera M.D., M.S., F.A.C.P.  Hematology/Oncology Attending  [facility]           Route Chart for Scheduling    Med Onc Chart Routing      Follow up with physician 4 weeks. RTC in 4 weeks with CT CAP, and labs (CBC,CMP, TSH) and to see  me on Monday, Wednesday ONLY (pt has dialysis on Tuesday and Thursday).   Follow up with [facility]    Infusion scheduling note    Injection scheduling note    Labs CBC, CMP and TSH   Lab interval:  4 wks   Imaging CT chest abdomen pelvis   4 wks   Pharmacy appointment    Other referrals

## 2022-10-06 ENCOUNTER — HOSPITAL ENCOUNTER (OUTPATIENT)
Facility: HOSPITAL | Age: 57
Discharge: HOME | End: 2022-10-07
Attending: EMERGENCY MEDICINE | Admitting: HOSPITALIST
Payer: MEDICARE

## 2022-10-06 ENCOUNTER — OFFICE VISIT (OUTPATIENT)
Dept: HEMATOLOGY/ONCOLOGY | Facility: CLINIC | Age: 57
End: 2022-10-06
Payer: MEDICARE

## 2022-10-06 VITALS
OXYGEN SATURATION: 97 % | SYSTOLIC BLOOD PRESSURE: 176 MMHG | DIASTOLIC BLOOD PRESSURE: 81 MMHG | RESPIRATION RATE: 18 BRPM | HEART RATE: 87 BPM | BODY MASS INDEX: 20.14 KG/M2 | HEIGHT: 64 IN | TEMPERATURE: 98 F | WEIGHT: 117.94 LBS

## 2022-10-06 DIAGNOSIS — D64.9 SYMPTOMATIC ANEMIA: ICD-10-CM

## 2022-10-06 DIAGNOSIS — Z99.2 ESRD (END STAGE RENAL DISEASE) ON DIALYSIS: ICD-10-CM

## 2022-10-06 DIAGNOSIS — N18.6 ESRD (END STAGE RENAL DISEASE) ON DIALYSIS: ICD-10-CM

## 2022-10-06 DIAGNOSIS — C64.9 METASTATIC RENAL CELL CARCINOMA, UNSPECIFIED LATERALITY: Primary | ICD-10-CM

## 2022-10-06 DIAGNOSIS — R07.9 CHEST PAIN: ICD-10-CM

## 2022-10-06 DIAGNOSIS — C64.2 METASTATIC RENAL CELL CARCINOMA, LEFT: ICD-10-CM

## 2022-10-06 DIAGNOSIS — C64.9 METASTATIC RENAL CELL CARCINOMA TO INTRA-ABDOMINAL SITE: ICD-10-CM

## 2022-10-06 DIAGNOSIS — Z99.2 DIALYSIS PATIENT: ICD-10-CM

## 2022-10-06 DIAGNOSIS — C79.89 METASTATIC RENAL CELL CARCINOMA TO INTRA-ABDOMINAL SITE: ICD-10-CM

## 2022-10-06 LAB
ABO + RH BLD: NORMAL
ALBUMIN SERPL BCP-MCNC: 2.4 G/DL (ref 3.5–5.2)
ALP SERPL-CCNC: 241 U/L (ref 55–135)
ALT SERPL W/O P-5'-P-CCNC: 22 U/L (ref 10–44)
ANION GAP SERPL CALC-SCNC: 10 MMOL/L (ref 8–16)
ANISOCYTOSIS BLD QL SMEAR: ABNORMAL
AST SERPL-CCNC: 39 U/L (ref 10–40)
BASOPHILS # BLD AUTO: 0.04 K/UL (ref 0–0.2)
BASOPHILS NFR BLD: 0.6 % (ref 0–1.9)
BILIRUB SERPL-MCNC: 0.6 MG/DL (ref 0.1–1)
BLD GP AB SCN CELLS X3 SERPL QL: NORMAL
BUN SERPL-MCNC: 30 MG/DL (ref 6–20)
CALCIUM SERPL-MCNC: 8.6 MG/DL (ref 8.7–10.5)
CHLORIDE SERPL-SCNC: 105 MMOL/L (ref 95–110)
CO2 SERPL-SCNC: 28 MMOL/L (ref 23–29)
CREAT SERPL-MCNC: 3.7 MG/DL (ref 0.5–1.4)
DIFFERENTIAL METHOD: ABNORMAL
EOSINOPHIL # BLD AUTO: 0.2 K/UL (ref 0–0.5)
EOSINOPHIL NFR BLD: 3 % (ref 0–8)
ERYTHROCYTE [DISTWIDTH] IN BLOOD BY AUTOMATED COUNT: 19.6 % (ref 11.5–14.5)
EST. GFR  (NO RACE VARIABLE): 14 ML/MIN/1.73 M^2
GLUCOSE SERPL-MCNC: 86 MG/DL (ref 70–110)
HCT VFR BLD AUTO: 17.3 % (ref 37–48.5)
HGB BLD-MCNC: 5.7 G/DL (ref 12–16)
HYPOCHROMIA BLD QL SMEAR: ABNORMAL
IMM GRANULOCYTES # BLD AUTO: 0.02 K/UL (ref 0–0.04)
IMM GRANULOCYTES NFR BLD AUTO: 0.3 % (ref 0–0.5)
LYMPHOCYTES # BLD AUTO: 1.4 K/UL (ref 1–4.8)
LYMPHOCYTES NFR BLD: 19.7 % (ref 18–48)
MAGNESIUM SERPL-MCNC: 2.2 MG/DL (ref 1.6–2.6)
MCH RBC QN AUTO: 34.3 PG (ref 27–31)
MCHC RBC AUTO-ENTMCNC: 32.9 G/DL (ref 32–36)
MCV RBC AUTO: 104 FL (ref 82–98)
MONOCYTES # BLD AUTO: 0.9 K/UL (ref 0.3–1)
MONOCYTES NFR BLD: 12.1 % (ref 4–15)
NEUTROPHILS # BLD AUTO: 4.6 K/UL (ref 1.8–7.7)
NEUTROPHILS NFR BLD: 64.3 % (ref 38–73)
NRBC BLD-RTO: 0 /100 WBC
OVALOCYTES BLD QL SMEAR: ABNORMAL
PHOSPHATE SERPL-MCNC: 3.3 MG/DL (ref 2.7–4.5)
PLATELET # BLD AUTO: 214 K/UL (ref 150–450)
PMV BLD AUTO: 9.7 FL (ref 9.2–12.9)
POIKILOCYTOSIS BLD QL SMEAR: SLIGHT
POLYCHROMASIA BLD QL SMEAR: ABNORMAL
POTASSIUM SERPL-SCNC: 3.2 MMOL/L (ref 3.5–5.1)
PROT SERPL-MCNC: 5.8 G/DL (ref 6–8.4)
RBC # BLD AUTO: 1.66 M/UL (ref 4–5.4)
SCHISTOCYTES BLD QL SMEAR: PRESENT
SODIUM SERPL-SCNC: 143 MMOL/L (ref 136–145)
TARGETS BLD QL SMEAR: ABNORMAL
WBC # BLD AUTO: 7.09 K/UL (ref 3.9–12.7)

## 2022-10-06 PROCEDURE — 99999 PR PBB SHADOW E&M-EST. PATIENT-LVL III: CPT | Mod: PBBFAC,,, | Performed by: INTERNAL MEDICINE

## 2022-10-06 PROCEDURE — 99215 OFFICE O/P EST HI 40 MIN: CPT | Mod: S$PBB,,, | Performed by: INTERNAL MEDICINE

## 2022-10-06 PROCEDURE — 83735 ASSAY OF MAGNESIUM: CPT | Performed by: EMERGENCY MEDICINE

## 2022-10-06 PROCEDURE — G0378 HOSPITAL OBSERVATION PER HR: HCPCS

## 2022-10-06 PROCEDURE — 93010 EKG 12-LEAD: ICD-10-PCS | Mod: ,,, | Performed by: INTERNAL MEDICINE

## 2022-10-06 PROCEDURE — 84100 ASSAY OF PHOSPHORUS: CPT | Performed by: EMERGENCY MEDICINE

## 2022-10-06 PROCEDURE — 93010 ELECTROCARDIOGRAM REPORT: CPT | Mod: ,,, | Performed by: INTERNAL MEDICINE

## 2022-10-06 PROCEDURE — 93005 ELECTROCARDIOGRAM TRACING: CPT

## 2022-10-06 PROCEDURE — 86900 BLOOD TYPING SEROLOGIC ABO: CPT | Performed by: EMERGENCY MEDICINE

## 2022-10-06 PROCEDURE — 99285 EMERGENCY DEPT VISIT HI MDM: CPT | Mod: 25

## 2022-10-06 PROCEDURE — 25000003 PHARM REV CODE 250: Performed by: PHYSICIAN ASSISTANT

## 2022-10-06 PROCEDURE — 86920 COMPATIBILITY TEST SPIN: CPT | Performed by: EMERGENCY MEDICINE

## 2022-10-06 PROCEDURE — 85025 COMPLETE CBC W/AUTO DIFF WBC: CPT | Performed by: EMERGENCY MEDICINE

## 2022-10-06 PROCEDURE — 99999 PR PBB SHADOW E&M-EST. PATIENT-LVL III: ICD-10-PCS | Mod: PBBFAC,,, | Performed by: INTERNAL MEDICINE

## 2022-10-06 PROCEDURE — 99215 PR OFFICE/OUTPT VISIT, EST, LEVL V, 40-54 MIN: ICD-10-PCS | Mod: S$PBB,,, | Performed by: INTERNAL MEDICINE

## 2022-10-06 PROCEDURE — 99213 OFFICE O/P EST LOW 20 MIN: CPT | Mod: PBBFAC,27 | Performed by: INTERNAL MEDICINE

## 2022-10-06 PROCEDURE — P9016 RBC LEUKOCYTES REDUCED: HCPCS | Performed by: EMERGENCY MEDICINE

## 2022-10-06 PROCEDURE — 36430 TRANSFUSION BLD/BLD COMPNT: CPT

## 2022-10-06 PROCEDURE — 80053 COMPREHEN METABOLIC PANEL: CPT | Performed by: EMERGENCY MEDICINE

## 2022-10-06 RX ORDER — NALOXONE HCL 0.4 MG/ML
0.02 VIAL (ML) INJECTION
Status: DISCONTINUED | OUTPATIENT
Start: 2022-10-06 | End: 2022-10-07 | Stop reason: HOSPADM

## 2022-10-06 RX ORDER — LANOLIN ALCOHOL/MO/W.PET/CERES
800 CREAM (GRAM) TOPICAL
Status: DISCONTINUED | OUTPATIENT
Start: 2022-10-06 | End: 2022-10-07 | Stop reason: HOSPADM

## 2022-10-06 RX ORDER — HYDROCODONE BITARTRATE AND ACETAMINOPHEN 500; 5 MG/1; MG/1
TABLET ORAL
Status: DISCONTINUED | OUTPATIENT
Start: 2022-10-06 | End: 2022-10-07 | Stop reason: HOSPADM

## 2022-10-06 RX ORDER — IBUPROFEN 200 MG
16 TABLET ORAL
Status: DISCONTINUED | OUTPATIENT
Start: 2022-10-06 | End: 2022-10-07 | Stop reason: HOSPADM

## 2022-10-06 RX ORDER — GLUCAGON 1 MG
1 KIT INJECTION
Status: DISCONTINUED | OUTPATIENT
Start: 2022-10-06 | End: 2022-10-07 | Stop reason: HOSPADM

## 2022-10-06 RX ORDER — TALC
6 POWDER (GRAM) TOPICAL NIGHTLY PRN
Status: DISCONTINUED | OUTPATIENT
Start: 2022-10-06 | End: 2022-10-07 | Stop reason: HOSPADM

## 2022-10-06 RX ORDER — AMOXICILLIN 250 MG
1 CAPSULE ORAL DAILY PRN
Status: DISCONTINUED | OUTPATIENT
Start: 2022-10-06 | End: 2022-10-07 | Stop reason: HOSPADM

## 2022-10-06 RX ORDER — OXYCODONE AND ACETAMINOPHEN 5; 325 MG/1; MG/1
1 TABLET ORAL EVERY 6 HOURS PRN
Qty: 30 EACH | Refills: 0 | Status: SHIPPED | OUTPATIENT
Start: 2022-10-06 | End: 2022-12-20

## 2022-10-06 RX ORDER — SODIUM CHLORIDE 0.9 % (FLUSH) 0.9 %
10 SYRINGE (ML) INJECTION EVERY 8 HOURS
Status: DISCONTINUED | OUTPATIENT
Start: 2022-10-06 | End: 2022-10-06

## 2022-10-06 RX ORDER — IBUPROFEN 200 MG
24 TABLET ORAL
Status: DISCONTINUED | OUTPATIENT
Start: 2022-10-06 | End: 2022-10-07 | Stop reason: HOSPADM

## 2022-10-06 RX ORDER — ONDANSETRON 8 MG/1
8 TABLET, ORALLY DISINTEGRATING ORAL EVERY 12 HOURS PRN
Qty: 30 TABLET | Refills: 1 | Status: SHIPPED | OUTPATIENT
Start: 2022-10-06 | End: 2022-12-20

## 2022-10-06 RX ORDER — HYDRALAZINE HYDROCHLORIDE 25 MG/1
100 TABLET, FILM COATED ORAL EVERY 12 HOURS
Status: DISCONTINUED | OUTPATIENT
Start: 2022-10-06 | End: 2022-10-07 | Stop reason: HOSPADM

## 2022-10-06 RX ORDER — SODIUM CHLORIDE 0.9 % (FLUSH) 0.9 %
10 SYRINGE (ML) INJECTION
Status: DISCONTINUED | OUTPATIENT
Start: 2022-10-06 | End: 2022-10-07 | Stop reason: HOSPADM

## 2022-10-06 RX ORDER — ACETAMINOPHEN 325 MG/1
650 TABLET ORAL EVERY 4 HOURS PRN
Status: DISCONTINUED | OUTPATIENT
Start: 2022-10-06 | End: 2022-10-07 | Stop reason: HOSPADM

## 2022-10-06 RX ORDER — METOPROLOL SUCCINATE 50 MG/1
100 TABLET, EXTENDED RELEASE ORAL DAILY
Status: DISCONTINUED | OUTPATIENT
Start: 2022-10-07 | End: 2022-10-07 | Stop reason: HOSPADM

## 2022-10-06 RX ADMIN — APIXABAN 2.5 MG: 2.5 TABLET, FILM COATED ORAL at 09:10

## 2022-10-06 RX ADMIN — HYDRALAZINE HYDROCHLORIDE 100 MG: 25 TABLET, FILM COATED ORAL at 09:10

## 2022-10-06 RX ADMIN — SACUBITRIL AND VALSARTAN 2 TABLET: 24; 26 TABLET, FILM COATED ORAL at 09:10

## 2022-10-06 NOTE — Clinical Note
Diagnosis: Symptomatic anemia [[id number]]   Future Attending Provider: FIDEL CONROY [[id number]]   Admitting Provider:: FIDEL CONROY [[id number]]

## 2022-10-06 NOTE — ED PROVIDER NOTES
"Encounter Date: 10/6/2022    SCRIBE #1 NOTE: I, Anastacio Carter, am scribing for, and in the presence of,  Cassia Price MD. Other sections scribed: HPI, ROS, PE.     History     Chief Complaint   Patient presents with    abnormal labs     Abnormal Lab (Patient arrives w documentation from lab draw on 10/4 of HGB 6.3. She is weak and dizzy w ambulation.She has had 1 prior blood transfusion approx 3 months ago. Patient a dialysis patient T/T/Sat. Went to Dialysis today and had her full tx. )     CC: Abnormal lab    HPI: This is a 57 y.o. F who has a PMHx of Anemia, Bronchitis, CHF, Essential hypertension, History of kidney cancer s/p left nephrectomy 1/2016, Hyperparathyroidism,Prediabetes, ESRD who presents to the ED for emergent evaluation of abnormal labs. Pt recently had labs done and was received a call to report to the ED today due to 6.3 H&H. Pt reports losing a copious amount of blood for about an 1 hour 30 minutes due to inability to control the bleeding from fistula 3 weeks ago. Pt was seen at the ED at that time, in which she received sutured. She followed up with Dr. Muñoz and had sutures removed. She also had an US of the fistula at that time, which was normal. She has a follow-up appointment with Dr. Muñoz in 6 months. Pt states that she recently started receiving the epoetin david-epbx injection 2 days ago. She reports difficulty controlling the bleeding from fistula today for about 30-40 minutes because "the lady did not know what she was doing.". She is on Eliquis. She has had a blood transfusion in the past, which she last received a few months ago after epistaxis. Pt endorses that she has been fatigue, and generalized weakness for the last 2 days. She also endorses mild SOB. Pt reports a Fhx of kidney problems. She has a Hx of left nephrectomy due to masses on the kidney. Pt goes to dialysis and no longer makes urine. Pt also states that she has a defibrillator. Pt denies blood in stool, " black stool, CP, lightheadedness, alcohol use, or tobacco use.        The history is provided by the patient and the spouse. No  was used.   Review of patient's allergies indicates:   Allergen Reactions    Coreg [carvedilol] Other (See Comments)     Nausea/vomiting    Allopurinol      Other reaction(s): abnormal transaminases     Past Medical History:   Diagnosis Date    Anemia     Bronchitis 03/01/2017    Cancer 2016    kidney cancer    CHF (congestive heart failure), NYHA class II, chronic, systolic     CMV (cytomegalovirus) antibody positive     Essential hypertension 9/23/2015    H/O herpes simplex type 2 infection     Herpes simplex type 1 antibody positive     History of kidney cancer     s/p left nephrectomy 1/2016    Hyperparathyroidism, unspecified     Hyperuricemia without signs of inflammatory arthritis and tophaceous disease     Kidney stones     LGSIL (low grade squamous intraepithelial dysplasia)     Myocardiopathy 7/21/2017    Prediabetes     Proteinuria     Renal disorder     Thyroid nodule     Urate nephropathy      Past Surgical History:   Procedure Laterality Date    BREAST CYST EXCISION      COLONOSCOPY N/A 11/12/2015    COLONOSCOPY N/A 3/12/2021    Procedure: COLONOSCOPY;  Surgeon: Brendon Lanier MD;  Location[facility];  Service: Endoscopy;  Laterality: N/A;  covid test 3/9, labs prior, prep instr mailed -ml    INSERTION OF BIVENTRICULAR IMPLANTABLE CARDIOVERTER-DEFIBRILLATOR (ICD)  04/2021    NEPHRECTOMY-LAPAROSCOPIC Left 01/12/2016    PERITONEAL CATHETER INSERTION      Permacath insertion  01/12/2017    SALPINGOOPHORECTOMY Right 2016    KJB---DAVINCI    TONSILLECTOMY      TUBAL LIGATION       Family History   Problem Relation Age of Onset    Hypertension Mother     Diabetes Father     Kidney disease Father     No Known Problems Son     No Known Problems Son     Diabetes Maternal Grandfather     No Known Problems Sister     No Known Problems Brother     No Known  Problems Maternal Grandmother     No Known Problems Paternal Grandmother     No Known Problems Paternal Grandfather     Heart disease Sister     No Known Problems Sister     No Known Problems Brother     No Known Problems Maternal Aunt     No Known Problems Maternal Uncle     No Known Problems Paternal Aunt     No Known Problems Paternal Uncle     Breast cancer Neg Hx     Colon cancer Neg Hx     Cancer Neg Hx     Stroke Neg Hx     Amblyopia Neg Hx     Blindness Neg Hx     Cataracts Neg Hx     Glaucoma Neg Hx     Macular degeneration Neg Hx     Retinal detachment Neg Hx     Strabismus Neg Hx     Thyroid disease Neg Hx      Social History     Tobacco Use    Smoking status: Never    Smokeless tobacco: Never   Substance Use Topics    Alcohol use: No     Comment: . 2 children. works at [employer].    Drug use: No     Review of Systems   Constitutional:  Positive for fatigue. Negative for chills, diaphoresis and fever.   Eyes:  Negative for photophobia and visual disturbance.   Respiratory:  Positive for shortness of breath. Negative for cough.    Cardiovascular:  Negative for chest pain and leg swelling.   Gastrointestinal:  Negative for abdominal pain, blood in stool, constipation, diarrhea, nausea and vomiting.        (-) Melena   Genitourinary:  Negative for dysuria, flank pain, frequency, hematuria and urgency.   Musculoskeletal:  Positive for myalgias. Negative for neck pain and neck stiffness.   Skin:  Negative for rash and wound.   Neurological:  Positive for weakness (generalized). Negative for light-headedness, numbness and headaches.   Psychiatric/Behavioral:  Negative for confusion and suicidal ideas.      Physical Exam     Initial Vitals [10/06/22 1500]   BP Pulse Resp Temp SpO2   (!) 163/72 84 20 98.1 °F (36.7 °C) 98 %      MAP       --         Physical Exam    Nursing note and vitals reviewed.  Constitutional: She appears well-developed and well-nourished. She is not diaphoretic. No distress.   HENT:    Head: Normocephalic and atraumatic.   Mouth/Throat: Oropharynx is clear and moist. No oropharyngeal exudate.   Eyes: Conjunctivae and EOM are normal. Pupils are equal, round, and reactive to light. Right eye exhibits no discharge. Left eye exhibits no discharge.   Subconjunctivae pallor   Neck: Neck supple. No JVD present.   Normal range of motion.  Cardiovascular:  Normal rate, regular rhythm and intact distal pulses.     Exam reveals no gallop and no friction rub.       Murmur heard.  Pulmonary/Chest: Breath sounds normal. No respiratory distress. She has no wheezes. She has no rhonchi. She has no rales.   Abdominal: Abdomen is soft. Bowel sounds are normal. She exhibits no distension. There is no abdominal tenderness. There is no rebound and no guarding.   Genitourinary:    Genitourinary Comments: Scant stool in the vault and FOBT negative. Hemorrhoids present externally.     Musculoskeletal:         General: No tenderness or edema.      Cervical back: Normal range of motion and neck supple.      Comments: 1+ pitting edema to mid shins. Left AV fistula with good thrill. There is a bandage in place.     Lymphadenopathy:     She has no cervical adenopathy.   Neurological: She is alert and oriented to person, place, and time. No cranial nerve deficit.   Skin: Skin is warm and dry.   Psychiatric: She has a normal mood and affect. Thought content normal.       ED Course   Procedures  Labs Reviewed   CBC W/ AUTO DIFFERENTIAL - Abnormal; Notable for the following components:       Result Value    RBC 1.66 (*)     Hemoglobin 5.7 (*)     Hematocrit 17.3 (*)      (*)     MCH 34.3 (*)     RDW 19.6 (*)     All other components within normal limits    Narrative:        Hemoglobin/hematocrit critical result(s) called and verbal   readback obtained from Valerie Dhillon RN. by [facility] 10/06/2022 16:47   COMPREHENSIVE METABOLIC PANEL - Abnormal; Notable for the following components:    Potassium 3.2 (*)     BUN 30 (*)      Creatinine 3.7 (*)     Calcium 8.6 (*)     Total Protein 5.8 (*)     Albumin 2.4 (*)     Alkaline Phosphatase 241 (*)     eGFR 14 (*)     All other components within normal limits    Narrative:     Collection has been rescheduled by [email or web address] at 10/06/2022 16:56 Reason:   Unable to collect hard stick nurse maria isabel vega   MAGNESIUM    Narrative:     Collection has been rescheduled by [email or web address] at 10/06/2022 16:56 Reason:   Unable to collect hard stick nurse maria isabel aware   PHOSPHORUS    Narrative:     Collection has been rescheduled by MLR1 at 10/06/2022 16:56 Reason:   Unable to collect hard stick nurse maria isabel vega   TYPE & SCREEN   PREPARE RBC SOFT          Imaging Results    None          Medications   0.9%  NaCl infusion (for blood administration) (has no administration in time range)   melatonin tablet 6 mg (has no administration in time range)   senna-docusate 8.6-50 mg per tablet 1 tablet (has no administration in time range)   acetaminophen tablet 650 mg (has no administration in time range)   naloxone 0.4 mg/mL injection 0.02 mg (has no administration in time range)   magnesium oxide tablet 800 mg (has no administration in time range)   magnesium oxide tablet 800 mg (has no administration in time range)   glucose chewable tablet 16 g (has no administration in time range)   glucose chewable tablet 24 g (has no administration in time range)   glucagon (human recombinant) injection 1 mg (has no administration in time range)   sodium chloride 0.9% flush 10 mL (has no administration in time range)   apixaban tablet 2.5 mg (2.5 mg Oral Given 10/6/22 2150)   hydrALAZINE tablet 100 mg (100 mg Oral Given 10/6/22 2150)   metoprolol succinate (TOPROL-XL) 24 hr tablet 100 mg (has no administration in time range)   sacubitriL-valsartan 24-26 mg per tablet 2 tablet (2 tablets Oral Given 10/6/22 2150)   MDM  58 yo female presents for symptomatic anemia. Symptoms of fatigue, SOB, generalized weakness. No CP. Cause likely 2/2 to  recent bleeding fistula in the setting of anemia of chronic disease, ESRD, and on epo injections. Rectal exam with FOBT negative and patient denies melena, BRBPR. Consented for blood at bedside and patient is understanding of the risks and benefits. Labs consistent with ESRD, no need for emergent dialysis at this time. Hgb 5.7, macrocytic. Case discussed with Jeyson who agrees to place the patient is observation for further management and care.            Scribe Attestation:   Scribe #1: I performed the above scribed service and the documentation accurately describes the services I performed. I attest to the accuracy of the note.      ED Course as of 10/06/22 2158   Thu Oct 06, 2022   1600 EKG 12-lead  Normal sinus rhythm 84.  No ST elevation or depression.  T-wave inversions in V1.  Right axis deviation.  Left atrial enlargement..  QTC is 503. [JT]      ED Course User Index  [JT] Cassia Price MD               I,Cassia Price, personally performed the services described in this documentation. All medical record entries made by the scribe were at my direction and in my presence. I have reviewed the chart and agree that the record reflects my personal performance and is accurate and complete.  Clinical Impression:   Final diagnoses:  [Z99.2] Dialysis patient  [D64.9] Symptomatic anemia  [R07.9] Chest pain      ED Disposition Condition    Observation Stable                Cassia Price MD  10/06/22 2200

## 2022-10-07 VITALS
RESPIRATION RATE: 20 BRPM | OXYGEN SATURATION: 96 % | SYSTOLIC BLOOD PRESSURE: 166 MMHG | HEIGHT: 64 IN | WEIGHT: 125.25 LBS | HEART RATE: 86 BPM | TEMPERATURE: 98 F | DIASTOLIC BLOOD PRESSURE: 82 MMHG | BODY MASS INDEX: 21.38 KG/M2

## 2022-10-07 DIAGNOSIS — E03.9 HYPOTHYROIDISM, UNSPECIFIED TYPE: ICD-10-CM

## 2022-10-07 DIAGNOSIS — N25.81 HYPERPARATHYROIDISM, SECONDARY RENAL: ICD-10-CM

## 2022-10-07 DIAGNOSIS — C64.9 METASTATIC RENAL CELL CARCINOMA, UNSPECIFIED LATERALITY: Primary | ICD-10-CM

## 2022-10-07 LAB
ALBUMIN SERPL BCP-MCNC: 2.5 G/DL (ref 3.5–5.2)
ALP SERPL-CCNC: 258 U/L (ref 55–135)
ALT SERPL W/O P-5'-P-CCNC: 20 U/L (ref 10–44)
ANION GAP SERPL CALC-SCNC: 12 MMOL/L (ref 8–16)
AST SERPL-CCNC: 38 U/L (ref 10–40)
BASOPHILS # BLD AUTO: 0.05 K/UL (ref 0–0.2)
BASOPHILS NFR BLD: 0.7 % (ref 0–1.9)
BILIRUB SERPL-MCNC: 1.1 MG/DL (ref 0.1–1)
BLD PROD TYP BPU: NORMAL
BLD PROD TYP BPU: NORMAL
BLOOD UNIT EXPIRATION DATE: NORMAL
BLOOD UNIT EXPIRATION DATE: NORMAL
BLOOD UNIT TYPE CODE: 6200
BLOOD UNIT TYPE CODE: 6200
BLOOD UNIT TYPE: NORMAL
BLOOD UNIT TYPE: NORMAL
BUN SERPL-MCNC: 47 MG/DL (ref 6–20)
CALCIUM SERPL-MCNC: 9.2 MG/DL (ref 8.7–10.5)
CHLORIDE SERPL-SCNC: 105 MMOL/L (ref 95–110)
CO2 SERPL-SCNC: 25 MMOL/L (ref 23–29)
CODING SYSTEM: NORMAL
CODING SYSTEM: NORMAL
CREAT SERPL-MCNC: 4.8 MG/DL (ref 0.5–1.4)
DIFFERENTIAL METHOD: ABNORMAL
DISPENSE STATUS: NORMAL
DISPENSE STATUS: NORMAL
EOSINOPHIL # BLD AUTO: 0.2 K/UL (ref 0–0.5)
EOSINOPHIL NFR BLD: 3.1 % (ref 0–8)
ERYTHROCYTE [DISTWIDTH] IN BLOOD BY AUTOMATED COUNT: 18.9 % (ref 11.5–14.5)
EST. GFR  (NO RACE VARIABLE): 10 ML/MIN/1.73 M^2
GLUCOSE SERPL-MCNC: 99 MG/DL (ref 70–110)
HCT VFR BLD AUTO: 24.2 % (ref 37–48.5)
HGB BLD-MCNC: 8.3 G/DL (ref 12–16)
IMM GRANULOCYTES # BLD AUTO: 0.03 K/UL (ref 0–0.04)
IMM GRANULOCYTES NFR BLD AUTO: 0.4 % (ref 0–0.5)
LYMPHOCYTES # BLD AUTO: 1.3 K/UL (ref 1–4.8)
LYMPHOCYTES NFR BLD: 18.6 % (ref 18–48)
MCH RBC QN AUTO: 32.9 PG (ref 27–31)
MCHC RBC AUTO-ENTMCNC: 34.3 G/DL (ref 32–36)
MCV RBC AUTO: 96 FL (ref 82–98)
MONOCYTES # BLD AUTO: 0.9 K/UL (ref 0.3–1)
MONOCYTES NFR BLD: 12.6 % (ref 4–15)
NEUTROPHILS # BLD AUTO: 4.6 K/UL (ref 1.8–7.7)
NEUTROPHILS NFR BLD: 64.6 % (ref 38–73)
NRBC BLD-RTO: 1 /100 WBC
NUM UNITS TRANS PACKED RBC: NORMAL
NUM UNITS TRANS PACKED RBC: NORMAL
PHOSPHATE SERPL-MCNC: 4.5 MG/DL (ref 2.7–4.5)
PLATELET # BLD AUTO: 229 K/UL (ref 150–450)
PMV BLD AUTO: 10 FL (ref 9.2–12.9)
POTASSIUM SERPL-SCNC: 3.6 MMOL/L (ref 3.5–5.1)
PROT SERPL-MCNC: 6.1 G/DL (ref 6–8.4)
RBC # BLD AUTO: 2.52 M/UL (ref 4–5.4)
SODIUM SERPL-SCNC: 142 MMOL/L (ref 136–145)
WBC # BLD AUTO: 7.16 K/UL (ref 3.9–12.7)

## 2022-10-07 PROCEDURE — 80053 COMPREHEN METABOLIC PANEL: CPT | Performed by: NURSE PRACTITIONER

## 2022-10-07 PROCEDURE — 25000003 PHARM REV CODE 250: Performed by: PHYSICIAN ASSISTANT

## 2022-10-07 PROCEDURE — G0378 HOSPITAL OBSERVATION PER HR: HCPCS

## 2022-10-07 PROCEDURE — 84100 ASSAY OF PHOSPHORUS: CPT | Performed by: PHYSICIAN ASSISTANT

## 2022-10-07 PROCEDURE — P9016 RBC LEUKOCYTES REDUCED: HCPCS | Performed by: EMERGENCY MEDICINE

## 2022-10-07 PROCEDURE — 85025 COMPLETE CBC W/AUTO DIFF WBC: CPT | Performed by: NURSE PRACTITIONER

## 2022-10-07 PROCEDURE — 36430 TRANSFUSION BLD/BLD COMPNT: CPT

## 2022-10-07 PROCEDURE — 63600175 PHARM REV CODE 636 W HCPCS: Performed by: PHYSICIAN ASSISTANT

## 2022-10-07 PROCEDURE — 96374 THER/PROPH/DIAG INJ IV PUSH: CPT

## 2022-10-07 RX ORDER — HYDRALAZINE HYDROCHLORIDE 20 MG/ML
10 INJECTION INTRAMUSCULAR; INTRAVENOUS ONCE
Status: COMPLETED | OUTPATIENT
Start: 2022-10-07 | End: 2022-10-07

## 2022-10-07 RX ADMIN — HYDRALAZINE HYDROCHLORIDE 10 MG: 20 INJECTION INTRAMUSCULAR; INTRAVENOUS at 03:10

## 2022-10-07 RX ADMIN — SACUBITRIL AND VALSARTAN 2 TABLET: 24; 26 TABLET, FILM COATED ORAL at 09:10

## 2022-10-07 RX ADMIN — METOPROLOL SUCCINATE 100 MG: 50 TABLET, EXTENDED RELEASE ORAL at 09:10

## 2022-10-07 RX ADMIN — APIXABAN 2.5 MG: 2.5 TABLET, FILM COATED ORAL at 09:10

## 2022-10-07 RX ADMIN — HYDRALAZINE HYDROCHLORIDE 100 MG: 25 TABLET, FILM COATED ORAL at 09:10

## 2022-10-07 NOTE — ASSESSMENT & PLAN NOTE
Echo 6/2022 with EF of 35% and grade 2DD. Appears euvolemic on exam  Continue home metoprolol/entresto  No longer makes urine, nephrology consulted for HD and volume removal  Daily weights/strict intake and output

## 2022-10-07 NOTE — SUBJECTIVE & OBJECTIVE
Past Medical History:   Diagnosis Date    Anemia     Bronchitis 03/01/2017    Cancer 2016    kidney cancer    CHF (congestive heart failure), NYHA class II, chronic, systolic     CMV (cytomegalovirus) antibody positive     Essential hypertension 9/23/2015    H/O herpes simplex type 2 infection     Herpes simplex type 1 antibody positive     History of kidney cancer     s/p left nephrectomy 1/2016    Hyperparathyroidism, unspecified     Hyperuricemia without signs of inflammatory arthritis and tophaceous disease     Kidney stones     LGSIL (low grade squamous intraepithelial dysplasia)     Myocardiopathy 7/21/2017    Prediabetes     Proteinuria     Renal disorder     Thyroid nodule     Urate nephropathy        Past Surgical History:   Procedure Laterality Date    BREAST CYST EXCISION      COLONOSCOPY N/A 11/12/2015    COLONOSCOPY N/A 3/12/2021    Procedure: COLONOSCOPY;  Surgeon: Brendon Lanier MD;  Location: [facility];  Service: Endoscopy;  Laterality: N/A;  covid test 3/9, labs prior, prep instr mailed -ml    INSERTION OF BIVENTRICULAR IMPLANTABLE CARDIOVERTER-DEFIBRILLATOR (ICD)  04/2021    NEPHRECTOMY-LAPAROSCOPIC Left 01/12/2016    PERITONEAL CATHETER INSERTION      Permacath insertion  01/12/2017    SALPINGOOPHORECTOMY Right 2016    KJB---DAVINCI    TONSILLECTOMY      TUBAL LIGATION         Review of patient's allergies indicates:   Allergen Reactions    Coreg [carvedilol] Other (See Comments)     Nausea/vomiting    Allopurinol      Other reaction(s): abnormal transaminases       No current facility-administered medications on file prior to encounter.     Current Outpatient Medications on File Prior to Encounter   Medication Sig    acetaminophen (TYLENOL) 500 MG tablet Take 500 mg by mouth every 6 (six) hours as needed for Pain.    apixaban (ELIQUIS) 2.5 mg Tab Take 1 tablet (2.5 mg total) by mouth 2 (two) times daily.    CABOMETYX 60 mg Tab TAKE ONE TABLET BY MOUTH ONCE DAILY AT THE SAME TIME ON AN EMPTY  STOMACH AT LEAST 1 HOUR BEFORE OR 2 HOURS AFTER EATING. AVOID GRAPEFRUIT PRODUCTS    cloNIDine 0.3 mg/24 hr td ptwk (CATAPRES) 0.3 mg/24 hr Place 1 patch onto the skin every 7 days.    epoetin david-epbx (RETACRIT INJ) Epoetin david - epbx (Retacrit)    hydrALAZINE (APRESOLINE) 100 MG tablet Take 1 tablet (100 mg total) by mouth every 12 (twelve) hours.    lidocaine-prilocaine (EMLA) cream APPLY ATLEAST 30 MINUTES BEFORE TREATMENT 3 TIMES A WEEK    metoprolol succinate (TOPROL-XL) 100 MG 24 hr tablet Take 1 tablet (100 mg total) by mouth once daily.    mv,Ca,min-folic acid-vit K1 (ONE-A-DAY WOMEN'S 50 PLUS) 400-20 mcg Tab Take 1 tablet by mouth.    ondansetron (ZOFRAN-ODT) 8 MG TbDL Take 1 tablet (8 mg total) by mouth every 12 (twelve) hours as needed.    oxyCODONE-acetaminophen (PERCOCET) 5-325 mg per tablet Take 1 tablet by mouth every 6 (six) hours as needed for Pain.    sacubitriL-valsartan (ENTRESTO) 49-51 mg per tablet Take 1 tablet by mouth 2 (two) times daily.    sevelamer carbonate (RENVELA) 800 mg Tab TAKE 2 TABLETS (1,600 MG TOTAL) BY MOUTH 3 (THREE) TIMES DAILY WITH MEALS.    sodium chloride 0.9% SolP 100 mL with iron sucrose 100 mg iron/5 mL Soln 100 mg 50 mg.    [DISCONTINUED] amLODIPine (NORVASC) 5 MG tablet TAKE 1 TABLET BY MOUTH EVERY DAY     Family History       Problem Relation (Age of Onset)    Diabetes Father, Maternal Grandfather    Heart disease Sister    Hypertension Mother    Kidney disease Father    No Known Problems Son, Son, Sister, Brother, Maternal Grandmother, Paternal Grandmother, Paternal Grandfather, Sister, Brother, Maternal Aunt, Maternal Uncle, Paternal Aunt, Paternal Uncle          Tobacco Use    Smoking status: Never    Smokeless tobacco: Never   Substance and Sexual Activity    Alcohol use: No     Comment: . 2 children. works at [employer].    Drug use: No    Sexual activity: Yes     Partners: Male     Review of Systems   Constitutional:  Positive for fatigue. Negative for  chills and fever.   HENT:  Negative for nosebleeds and tinnitus.    Eyes:  Negative for photophobia and visual disturbance.   Respiratory:  Positive for shortness of breath. Negative for wheezing.    Cardiovascular:  Negative for chest pain, palpitations and leg swelling.   Gastrointestinal:  Negative for abdominal distention, nausea and vomiting.   Genitourinary:  Negative for dysuria, flank pain and hematuria.   Musculoskeletal:  Negative for gait problem and joint swelling.   Skin:  Negative for rash and wound.   Neurological:  Negative for seizures and syncope.   Objective:     Vital Signs (Most Recent):  Temp: 98.1 °F (36.7 °C) (10/06/22 1500)  Pulse: 86 (10/06/22 1752)  Resp: 20 (10/06/22 1500)  BP: (!) 153/75 (10/06/22 1602)  SpO2: 100 % (10/06/22 1752)   Vital Signs (24h Range):  Temp:  [98.1 °F (36.7 °C)-98.2 °F (36.8 °C)] 98.1 °F (36.7 °C)  Pulse:  [84-87] 86  Resp:  [18-20] 20  SpO2:  [97 %-100 %] 100 %  BP: (153-176)/(72-81) 153/75     Weight: 53.1 kg (117 lb 1 oz)  Body mass index is 20.09 kg/m².    Physical Exam  Vitals and nursing note reviewed.   Constitutional:       General: She is not in acute distress.     Appearance: She is well-developed. She is not diaphoretic.   HENT:      Head: Normocephalic and atraumatic.      Right Ear: External ear normal.      Left Ear: External ear normal.   Eyes:      General:         Right eye: No discharge.         Left eye: No discharge.      Conjunctiva/sclera: Conjunctivae normal.   Neck:      Thyroid: No thyromegaly.   Cardiovascular:      Rate and Rhythm: Normal rate and regular rhythm.      Heart sounds: No murmur heard.  Pulmonary:      Effort: Pulmonary effort is normal. No respiratory distress.      Breath sounds: Normal breath sounds.   Abdominal:      General: Bowel sounds are normal. There is no distension.      Palpations: Abdomen is soft. There is no mass.      Tenderness: There is no abdominal tenderness.   Musculoskeletal:         General: No  deformity.      Cervical back: Normal range of motion and neck supple.      Right lower leg: No edema.      Left lower leg: No edema.      Comments: Graft to left upper extremity with palpable thrill without bleeding   Skin:     General: Skin is warm and dry.   Neurological:      Mental Status: She is alert and oriented to person, place, and time.      Sensory: No sensory deficit.   Psychiatric:         Mood and Affect: Mood normal.         Behavior: Behavior normal.           Significant Labs: CBC:   Recent Labs   Lab 10/06/22  1627   WBC 7.09   HGB 5.7*   HCT 17.3*        CMP:   Recent Labs   Lab 10/06/22  1709      K 3.2*      CO2 28   GLU 86   BUN 30*   CREATININE 3.7*   CALCIUM 8.6*   PROT 5.8*   ALBUMIN 2.4*   BILITOT 0.6   ALKPHOS 241*   AST 39   ALT 22   ANIONGAP 10       Significant Imaging:   Imaging Results    None

## 2022-10-07 NOTE — H&P
[facility] Medicine  History & Physical    Patient Name: Eva Bloom  MRN: [id number]  Patient Class: OP- Observation  Admission Date: 10/6/2022  Attending Physician: Rolf Recinos MD   Primary Care Provider: Sowmya Mccain MD         Patient information was obtained from patient, past medical records and ER records.     Subjective:     Principal Problem:Anemia in ESRD (end-stage renal disease)    Chief Complaint:   Chief Complaint   Patient presents with    abnormal labs     Abnormal Lab (Patient arrives w documentation from lab draw on 10/4 of HGB 6.3. She is weak and dizzy w ambulation.She has had 1 prior blood transfusion approx 3 months ago. Patient a dialysis patient T/T/Sat. Went to Dialysis today and had her full tx. )        HPI: Eva Bloom 57 y.o. female with ESRD on HD T/TH/S, HTN, RCC, CHF, presents to the hospital with a chief complaint of abnormal lab. She reports she was called today by her outpatient dialysis clinic for low hemoglobin.  She reports she was seen in the emergency room 3 weeks ago for bleeding from her fistula which was controlled with suture placement.  She has since followed up with her vascular surgeon has been undergoing dialysis was regular.  She has noted she has been increasingly fatigued and short of breath with exertion which improved with rest.  She no longer makes urine.  She denies fever chest pain nausea vomiting abdominal pain syncope dizziness melena hematuria hematemesis. She received a blood transfusion 6/2022 for which blood loss was attributed to epistaxis.     In the ED, hemoglobin 5.7, Cr 3.7, potassium 3.2, GUAIAC negative, magnesium 2.2, phos 3.3, A pos type and screen.      Past Medical History:   Diagnosis Date    Anemia     Bronchitis 03/01/2017    Cancer 2016    kidney cancer    CHF (congestive heart failure), NYHA class II, chronic, systolic     CMV (cytomegalovirus) antibody positive     Essential hypertension  9/23/2015    H/O herpes simplex type 2 infection     Herpes simplex type 1 antibody positive     History of kidney cancer     s/p left nephrectomy 1/2016    Hyperparathyroidism, unspecified     Hyperuricemia without signs of inflammatory arthritis and tophaceous disease     Kidney stones     LGSIL (low grade squamous intraepithelial dysplasia)     Myocardiopathy 7/21/2017    Prediabetes     Proteinuria     Renal disorder     Thyroid nodule     Urate nephropathy        Past Surgical History:   Procedure Laterality Date    BREAST CYST EXCISION      COLONOSCOPY N/A 11/12/2015    COLONOSCOPY N/A 3/12/2021    Procedure: COLONOSCOPY;  Surgeon: Brendon Lanier MD;  Location: [facility];  Service: Endoscopy;  Laterality: N/A;  covid test 3/9, labs prior, prep instr mailed -ml    INSERTION OF BIVENTRICULAR IMPLANTABLE CARDIOVERTER-DEFIBRILLATOR (ICD)  04/2021    NEPHRECTOMY-LAPAROSCOPIC Left 01/12/2016    PERITONEAL CATHETER INSERTION      Permacath insertion  01/12/2017    SALPINGOOPHORECTOMY Right 2016    KJB---DAVINCI    TONSILLECTOMY      TUBAL LIGATION         Review of patient's allergies indicates:   Allergen Reactions    Coreg [carvedilol] Other (See Comments)     Nausea/vomiting    Allopurinol      Other reaction(s): abnormal transaminases       No current facility-administered medications on file prior to encounter.     Current Outpatient Medications on File Prior to Encounter   Medication Sig    acetaminophen (TYLENOL) 500 MG tablet Take 500 mg by mouth every 6 (six) hours as needed for Pain.    apixaban (ELIQUIS) 2.5 mg Tab Take 1 tablet (2.5 mg total) by mouth 2 (two) times daily.    CABOMETYX 60 mg Tab TAKE ONE TABLET BY MOUTH ONCE DAILY AT THE SAME TIME ON AN EMPTY STOMACH AT LEAST 1 HOUR BEFORE OR 2 HOURS AFTER EATING. AVOID GRAPEFRUIT PRODUCTS    cloNIDine 0.3 mg/24 hr td ptwk (CATAPRES) 0.3 mg/24 hr Place 1 patch onto the skin every 7 days.    epoetin david-epbx (RETACRIT  INJ) Epoetin david - epbx (Retacrit)    hydrALAZINE (APRESOLINE) 100 MG tablet Take 1 tablet (100 mg total) by mouth every 12 (twelve) hours.    lidocaine-prilocaine (EMLA) cream APPLY ATLEAST 30 MINUTES BEFORE TREATMENT 3 TIMES A WEEK    metoprolol succinate (TOPROL-XL) 100 MG 24 hr tablet Take 1 tablet (100 mg total) by mouth once daily.    mv,Ca,min-folic acid-vit K1 (ONE-A-DAY WOMEN'S 50 PLUS) 400-20 mcg Tab Take 1 tablet by mouth.    ondansetron (ZOFRAN-ODT) 8 MG TbDL Take 1 tablet (8 mg total) by mouth every 12 (twelve) hours as needed.    oxyCODONE-acetaminophen (PERCOCET) 5-325 mg per tablet Take 1 tablet by mouth every 6 (six) hours as needed for Pain.    sacubitriL-valsartan (ENTRESTO) 49-51 mg per tablet Take 1 tablet by mouth 2 (two) times daily.    sevelamer carbonate (RENVELA) 800 mg Tab TAKE 2 TABLETS (1,600 MG TOTAL) BY MOUTH 3 (THREE) TIMES DAILY WITH MEALS.    sodium chloride 0.9% SolP 100 mL with iron sucrose 100 mg iron/5 mL Soln 100 mg 50 mg.    [DISCONTINUED] amLODIPine (NORVASC) 5 MG tablet TAKE 1 TABLET BY MOUTH EVERY DAY     Family History       Problem Relation (Age of Onset)    Diabetes Father, Maternal Grandfather    Heart disease Sister    Hypertension Mother    Kidney disease Father    No Known Problems Son, Son, Sister, Brother, Maternal Grandmother, Paternal Grandmother, Paternal Grandfather, Sister, Brother, Maternal Aunt, Maternal Uncle, Paternal Aunt, Paternal Uncle          Tobacco Use    Smoking status: Never    Smokeless tobacco: Never   Substance and Sexual Activity    Alcohol use: No     Comment: . 2 children. works at [employer].    Drug use: No    Sexual activity: Yes     Partners: Male     Review of Systems   Constitutional:  Positive for fatigue. Negative for chills and fever.   HENT:  Negative for nosebleeds and tinnitus.    Eyes:  Negative for photophobia and visual disturbance.   Respiratory:  Positive for shortness of breath. Negative for wheezing.     Cardiovascular:  Negative for chest pain, palpitations and leg swelling.   Gastrointestinal:  Negative for abdominal distention, nausea and vomiting.   Genitourinary:  Negative for dysuria, flank pain and hematuria.   Musculoskeletal:  Negative for gait problem and joint swelling.   Skin:  Negative for rash and wound.   Neurological:  Negative for seizures and syncope.   Objective:     Vital Signs (Most Recent):  Temp: 98.1 °F (36.7 °C) (10/06/22 1500)  Pulse: 86 (10/06/22 1752)  Resp: 20 (10/06/22 1500)  BP: (!) 153/75 (10/06/22 1602)  SpO2: 100 % (10/06/22 1752)   Vital Signs (24h Range):  Temp:  [98.1 °F (36.7 °C)-98.2 °F (36.8 °C)] 98.1 °F (36.7 °C)  Pulse:  [84-87] 86  Resp:  [18-20] 20  SpO2:  [97 %-100 %] 100 %  BP: (153-176)/(72-81) 153/75     Weight: 53.1 kg (117 lb 1 oz)  Body mass index is 20.09 kg/m².    Physical Exam  Vitals and nursing note reviewed.   Constitutional:       General: She is not in acute distress.     Appearance: She is well-developed. She is not diaphoretic.   HENT:      Head: Normocephalic and atraumatic.      Right Ear: External ear normal.      Left Ear: External ear normal.   Eyes:      General:         Right eye: No discharge.         Left eye: No discharge.      Conjunctiva/sclera: Conjunctivae normal.   Neck:      Thyroid: No thyromegaly.   Cardiovascular:      Rate and Rhythm: Normal rate and regular rhythm.      Heart sounds: No murmur heard.  Pulmonary:      Effort: Pulmonary effort is normal. No respiratory distress.      Breath sounds: Normal breath sounds.   Abdominal:      General: Bowel sounds are normal. There is no distension.      Palpations: Abdomen is soft. There is no mass.      Tenderness: There is no abdominal tenderness.   Musculoskeletal:         General: No deformity.      Cervical back: Normal range of motion and neck supple.      Right lower leg: No edema.      Left lower leg: No edema.      Comments: Graft to left upper extremity with palpable thrill  without bleeding   Skin:     General: Skin is warm and dry.   Neurological:      Mental Status: She is alert and oriented to person, place, and time.      Sensory: No sensory deficit.   Psychiatric:         Mood and Affect: Mood normal.         Behavior: Behavior normal.           Significant Labs: CBC:   Recent Labs   Lab 10/06/22  1627   WBC 7.09   HGB 5.7*   HCT 17.3*        CMP:   Recent Labs   Lab 10/06/22  1709      K 3.2*      CO2 28   GLU 86   BUN 30*   CREATININE 3.7*   CALCIUM 8.6*   PROT 5.8*   ALBUMIN 2.4*   BILITOT 0.6   ALKPHOS 241*   AST 39   ALT 22   ANIONGAP 10       Significant Imaging:   Imaging Results    None           Assessment/Plan:     * Anemia in ESRD (end-stage renal disease)  Presents with hemoglobin of 5.7 decreased from 7.9 2 weeks ago. GUAIAC negative in ED, seen in ED 9/24 for pulsatile bleeding from graft. Suspect blood loss caused by previous bleed which has now stopped. Per patient has been dialyzed successfully via fistula since that time  Will type and screen  transfuse 2 units    PAF (paroxysmal atrial fibrillation)  Continue home eliquis/metorpolol  Monitor on telemetry    ICD (implantable cardioverter-defibrillator) in place - SubQ  Stable without complaints of discharge    CHF (congestive heart failure), NYHA class II, chronic, systolic  Echo 6/2022 with EF of 35% and grade 2DD. Appears euvolemic on exam  Continue home metoprolol/entresto  No longer makes urine, nephrology consulted for HD and volume removal  Daily weights/strict intake and output    ESRD (end stage renal disease) on dialysis  Nephrology consulted for HD    S/p nephrectomy left  History of RCC followed by oncology outpatient. Home casodex non-formulary. Will recommend continued outpatient follow up.     Essential hypertension  Fair control. Continue home hydralazine, metoprolol, entresto. Currently wearing home clonidine patch    Hyperparathyroidism, secondary renal  Per patient no longer  on renvela. Will check Phos      VTE Risk Mitigation (From admission, onward)         Ordered     apixaban tablet 2.5 mg  2 times daily         10/06/22 1923     IP VTE HIGH RISK PATIENT  Once         10/06/22 1921     Place sequential compression device  Until discontinued         10/06/22 1921     Place PING hose  Until discontinued         10/06/22 1921              VTE: eliquis  Code: full  Diet: renal  Dispo: pending transfusion  As clarification, on 10/6/2022, patient should be admitted for hospital observation services under my care in collaboration with Rolf Recinos MD. Jeyson Andrade PA-C  Department of Hospital Medicine   [facility]

## 2022-10-07 NOTE — NURSING
Discharge instructions complete. Educated pt on upcoming appointments, medications and when to seek help. Pt displayed understanding. Family at bedside. Pt discharged to home.

## 2022-10-07 NOTE — NURSING
Bedside handoff report received from off going nurse. Pt lying in bed, NAD noted. Fall/safety precautions maintained. Call light and personal belongings within reach. Communication board updated. Will continue to monitor.

## 2022-10-07 NOTE — ASSESSMENT & PLAN NOTE
Presents with hemoglobin of 5.7 decreased from 7.9 2 weeks ago. GUAIAC negative in ED, seen in ED 9/24 for pulsatile bleeding from graft. Suspect blood loss caused by previous bleed which has now stopped. Per patient has been dialyzed successfully via fistula since that time  Will type and screen  transfuse 2 units

## 2022-10-07 NOTE — HPI
Eva Bloom 57 y.o. female with ESRD on HD T/TH/S, HTN, RCC, CHF, presents to the hospital with a chief complaint of abnormal lab. She reports she was called today by her outpatient dialysis clinic for low hemoglobin.  She reports she was seen in the emergency room 3 weeks ago for bleeding from her fistula which was controlled with suture placement.  She has since followed up with her vascular surgeon has been undergoing dialysis was regular.  She has noted she has been increasingly fatigued and short of breath with exertion which improved with rest.  She no longer makes urine.  She denies fever chest pain nausea vomiting abdominal pain syncope dizziness melena hematuria hematemesis. She received a blood transfusion 6/2022 for which blood loss was attributed to epistaxis.     In the ED, hemoglobin 5.7, Cr 3.7, potassium 3.2, GUAIAC negative, magnesium 2.2, phos 3.3, A pos type and screen.

## 2022-10-07 NOTE — ASSESSMENT & PLAN NOTE
History of RCC followed by oncology outpatient. Home casodex non-formulary. Will recommend continued outpatient follow up.

## 2022-10-07 NOTE — PLAN OF CARE
[facility] - Telemetry  Discharge Assessment    Primary Care Provider: Sowmya Mccain MD     Discharge Assessment (most recent)       BRIEF DISCHARGE ASSESSMENT - 10/07/22 0948          Discharge Planning    Assessment Type Discharge Planning Brief Assessment     Resource/Environmental Concerns none     Support Systems Spouse/significant other     Equipment Currently Used at Home none     Current Living Arrangements home/apartment/condo     Patient/Family Anticipates Transition to home with family     Patient/Family Anticipated Services at Transition none     DME Needed Upon Discharge  none     Discharge Plan A Home     Discharge Plan B Home                     [id number] [place] - [address]  [address] [place] [address]  Phone: [phone] Fax: [phone]    [facility] - [address]  [address] [place] [address]  Phone: [phone] Fax: [phone]    [facility]  [address] [facility] [address]  Phone: [phone] Fax: [phone]    Extended Emergency Contact Information  Primary Emergency Contact: Otis Bloom  Address: [address] [place] [address] [facility]  Home Phone: [phone]  Mobile Phone: [phone]  Relation: Spouse

## 2022-10-07 NOTE — ASSESSMENT & PLAN NOTE
Fair control. Continue home hydralazine, metoprolol, entresto. Currently wearing home clonidine patch

## 2022-10-09 NOTE — DISCHARGE SUMMARY
[facility] Medicine  Discharge Summary      Patient Name: Eva Bloom  MRN: [id number]  Patient Class: OP- Observation  Admission Date: 10/6/2022  Hospital Length of Stay: 0 days  Discharge Date and Time:  10/7/2022  Attending Physician: Rolf Recinos MD  Discharging Provider: Debbi Nuñez NP  Primary Care Provider: Sowmya Mccain MD      HPI:   Eva Bloom 57 y.o. female with ESRD on HD T/TH/S, HTN, RCC, CHF, presents to the hospital with a chief complaint of abnormal lab. She reports she was called today by her outpatient dialysis clinic for low hemoglobin.  She reports she was seen in the emergency room 3 weeks ago for bleeding from her fistula which was controlled with suture placement.  She has since followed up with her vascular surgeon has been undergoing dialysis was regular.  She has noted she has been increasingly fatigued and short of breath with exertion which improved with rest.  She no longer makes urine.  She denies fever chest pain nausea vomiting abdominal pain syncope dizziness melena hematuria hematemesis. She received a blood transfusion 6/2022 for which blood loss was attributed to epistaxis.     In the ED, hemoglobin 5.7, Cr 3.7, potassium 3.2, GUAIAC negative, magnesium 2.2, phos 3.3, A pos type and screen.      * No surgery found *      Hospital Course:   Admission to observation for symptomatic anemia. Suspect anemia from blood lost from dialysis access few weeks ago. Outpatient HD unit was planning to increase LUMA. Received 2 units of PRBC overnight hgb  5.7>8.3. Denies shortness of breath and ready for discharge. Patient HDS for discharge home and follow up outpatient HD.        Goals of Care Treatment Preferences:  Code Status: Full Code      Consults:     No new Assessment & Plan notes have been filed under this hospital service since the last note was generated.  Service: Hospital Medicine    Final Active Diagnoses:    Diagnosis Date Noted POA     PRINCIPAL PROBLEM:  Anemia in ESRD (end-stage renal disease) [N18.6, D63.1]  Yes    PAF (paroxysmal atrial fibrillation) [I48.0] 07/25/2022 Yes    ICD (implantable cardioverter-defibrillator) in place - SubQ [Z95.810] 07/15/2021 Yes    CHF (congestive heart failure), NYHA class II, chronic, systolic [I50.22]  Yes    ESRD (end stage renal disease) on dialysis [N18.6, Z99.2]  Not Applicable    S/p nephrectomy left [Z90.5] 10/07/2016 Not Applicable     Chronic    Essential hypertension [I10] 09/23/2015 Yes    Hyperparathyroidism, secondary renal [N25.81]  Yes      Problems Resolved During this Admission:       Discharged Condition: good    Disposition: Home or Self Care    Follow Up:    Patient Instructions:      Diet Cardiac     Diet renal     Notify your health care provider if you experience any of the following:  temperature >100.4     Notify your health care provider if you experience any of the following:  difficulty breathing or increased cough     Notify your health care provider if you experience any of the following:  increased confusion or weakness     Activity as tolerated       Significant Diagnostic Studies: Labs: All labs within the past 24 hours have been reviewed    Pending Diagnostic Studies:     None         Medications:  Reconciled Home Medications:      Medication List      CONTINUE taking these medications    acetaminophen 500 MG tablet  Commonly known as: TYLENOL  Take 500 mg by mouth every 6 (six) hours as needed for Pain.     CABOMETYX 60 mg Tab  Generic drug: cabozantinib  TAKE ONE TABLET BY MOUTH ONCE DAILY AT THE SAME TIME ON AN EMPTY STOMACH AT LEAST 1 HOUR BEFORE OR 2 HOURS AFTER EATING. AVOID GRAPEFRUIT PRODUCTS     cloNIDine 0.3 mg/24 hr td ptwk 0.3 mg/24 hr  Commonly known as: CATAPRES  Place 1 patch onto the skin every 7 days.     ELIQUIS 2.5 mg Tab  Generic drug: apixaban  Take 1 tablet (2.5 mg total) by mouth 2 (two) times daily.     ENTRESTO 49-51 mg per tablet  Generic drug:  sacubitriL-valsartan  Take 1 tablet by mouth 2 (two) times daily.     hydrALAZINE 100 MG tablet  Commonly known as: APRESOLINE  Take 1 tablet (100 mg total) by mouth every 12 (twelve) hours.     LIDOcaine-prilocaine cream  Commonly known as: EMLA  APPLY ATLEAST 30 MINUTES BEFORE TREATMENT 3 TIMES A WEEK     metoprolol succinate 100 MG 24 hr tablet  Commonly known as: TOPROL-XL  Take 1 tablet (100 mg total) by mouth once daily.     ondansetron 8 MG Tbdl  Commonly known as: ZOFRAN-ODT  Take 1 tablet (8 mg total) by mouth every 12 (twelve) hours as needed.     ONE-A-DAY WOMEN'S 50 PLUS 400-20 mcg Tab  Generic drug: mv,Ca,min-folic acid-vit K1  Take 1 tablet by mouth.     oxyCODONE-acetaminophen 5-325 mg per tablet  Commonly known as: PERCOCET  Take 1 tablet by mouth every 6 (six) hours as needed for Pain.     RETACRIT INJ  Epoetin david - epbx (Retacrit)     sevelamer carbonate 800 mg Tab  Commonly known as: RENVELA  TAKE 2 TABLETS (1,600 MG TOTAL) BY MOUTH 3 (THREE) TIMES DAILY WITH MEALS.     sodium chloride 0.9% SolP 100 mL with iron sucrose 100 mg iron/5 mL Soln 100 mg  50 mg.            Indwelling Lines/Drains at time of discharge:   Lines/Drains/Airways     Central Venous Catheter Line  Duration                Hemodialysis AV Graft Left upper arm -- days         Hemodialysis Catheter 01/12/17 0627 2096 days          Drain  Duration                Hemodialysis AV Fistula Left upper arm -- days         Hemodialysis AV Fistula 01/12/17 0629 Left upper arm 2096 days                Time spent on the discharge of patient: 30 minutes       Debbi Nuñez NP  Department of Hospital Medicine  [facility] [place] - Telemetry

## 2022-10-09 NOTE — HOSPITAL COURSE
Admission to observation for symptomatic anemia. Suspect anemia from blood lost from dialysis access few weeks ago. Outpatient HD unit was planning to increase LUMA. Received 2 units of PRBC overnight hgb  5.7>8.3. Denies shortness of breath and ready for discharge. Patient HDS for discharge home and follow up outpatient HD.

## 2022-10-18 ENCOUNTER — PATIENT MESSAGE (OUTPATIENT)
Dept: FAMILY MEDICINE | Facility: CLINIC | Age: 57
End: 2022-10-18
Payer: MEDICARE

## 2022-10-21 ENCOUNTER — TELEPHONE (OUTPATIENT)
Dept: FAMILY MEDICINE | Facility: CLINIC | Age: 57
End: 2022-10-21
Payer: MEDICARE

## 2022-10-22 ENCOUNTER — LAB VISIT (OUTPATIENT)
Dept: LAB | Facility: HOSPITAL | Age: 57
End: 2022-10-22
Attending: INTERNAL MEDICINE
Payer: COMMERCIAL

## 2022-10-22 DIAGNOSIS — I10 PRIMARY HYPERTENSION: ICD-10-CM

## 2022-10-22 DIAGNOSIS — E74.39 GLUCOSE INTOLERANCE: ICD-10-CM

## 2022-10-22 DIAGNOSIS — E04.2 MULTINODULAR GOITER: ICD-10-CM

## 2022-10-22 LAB
CHOLEST SERPL-MCNC: 101 MG/DL (ref 120–199)
CHOLEST/HDLC SERPL: 2.9 {RATIO} (ref 2–5)
ESTIMATED AVG GLUCOSE: 91 MG/DL (ref 68–131)
HBA1C MFR BLD: 4.8 % (ref 4–5.6)
HDLC SERPL-MCNC: 35 MG/DL (ref 40–75)
HDLC SERPL: 34.7 % (ref 20–50)
LDLC SERPL CALC-MCNC: 46.6 MG/DL (ref 63–159)
NONHDLC SERPL-MCNC: 66 MG/DL
T4 FREE SERPL-MCNC: 0.99 NG/DL (ref 0.71–1.51)
TRIGL SERPL-MCNC: 97 MG/DL (ref 30–150)
TSH SERPL DL<=0.005 MIU/L-ACNC: 8.71 UIU/ML (ref 0.4–4)

## 2022-10-22 PROCEDURE — 84443 ASSAY THYROID STIM HORMONE: CPT | Performed by: INTERNAL MEDICINE

## 2022-10-22 PROCEDURE — 80061 LIPID PANEL: CPT | Performed by: INTERNAL MEDICINE

## 2022-10-22 PROCEDURE — 83036 HEMOGLOBIN GLYCOSYLATED A1C: CPT | Performed by: INTERNAL MEDICINE

## 2022-10-22 PROCEDURE — 84439 ASSAY OF FREE THYROXINE: CPT | Performed by: INTERNAL MEDICINE

## 2022-10-22 PROCEDURE — 36415 COLL VENOUS BLD VENIPUNCTURE: CPT | Mod: PO | Performed by: INTERNAL MEDICINE

## 2022-10-25 ENCOUNTER — HOSPITAL ENCOUNTER (EMERGENCY)
Facility: HOSPITAL | Age: 57
Discharge: HOME | End: 2022-10-25
Attending: EMERGENCY MEDICINE
Payer: MEDICARE

## 2022-10-25 VITALS
DIASTOLIC BLOOD PRESSURE: 96 MMHG | OXYGEN SATURATION: 99 % | BODY MASS INDEX: 19.97 KG/M2 | HEART RATE: 101 BPM | TEMPERATURE: 99 F | WEIGHT: 117 LBS | HEIGHT: 64 IN | RESPIRATION RATE: 18 BRPM | SYSTOLIC BLOOD PRESSURE: 177 MMHG

## 2022-10-25 DIAGNOSIS — R07.9 CHEST PAIN: ICD-10-CM

## 2022-10-25 DIAGNOSIS — J06.9 VIRAL URI WITH COUGH: ICD-10-CM

## 2022-10-25 DIAGNOSIS — R79.89 ELEVATED TROPONIN: ICD-10-CM

## 2022-10-25 DIAGNOSIS — I42.9 CARDIOMYOPATHY, UNSPECIFIED TYPE: ICD-10-CM

## 2022-10-25 DIAGNOSIS — J02.9 ACUTE PHARYNGITIS, UNSPECIFIED ETIOLOGY: ICD-10-CM

## 2022-10-25 DIAGNOSIS — R07.89 CHEST PAIN, NON-CARDIAC: Primary | ICD-10-CM

## 2022-10-25 LAB
ALBUMIN SERPL BCP-MCNC: 3.1 G/DL (ref 3.5–5.2)
ALP SERPL-CCNC: 257 U/L (ref 55–135)
ALT SERPL W/O P-5'-P-CCNC: 28 U/L (ref 10–44)
ANION GAP SERPL CALC-SCNC: 16 MMOL/L (ref 8–16)
AST SERPL-CCNC: 53 U/L (ref 10–40)
BASOPHILS # BLD AUTO: 0.1 K/UL (ref 0–0.2)
BASOPHILS NFR BLD: 1.3 % (ref 0–1.9)
BILIRUB SERPL-MCNC: 1.5 MG/DL (ref 0.1–1)
BNP SERPL-MCNC: >4900 PG/ML (ref 0–99)
BUN SERPL-MCNC: 22 MG/DL (ref 6–20)
CALCIUM SERPL-MCNC: 9.1 MG/DL (ref 8.7–10.5)
CHLORIDE SERPL-SCNC: 101 MMOL/L (ref 95–110)
CO2 SERPL-SCNC: 25 MMOL/L (ref 23–29)
CREAT SERPL-MCNC: 3.8 MG/DL (ref 0.5–1.4)
CTP QC/QA: YES
DIFFERENTIAL METHOD: ABNORMAL
EOSINOPHIL # BLD AUTO: 0.1 K/UL (ref 0–0.5)
EOSINOPHIL NFR BLD: 0.9 % (ref 0–8)
ERYTHROCYTE [DISTWIDTH] IN BLOOD BY AUTOMATED COUNT: 19.9 % (ref 11.5–14.5)
EST. GFR  (NO RACE VARIABLE): 13 ML/MIN/1.73 M^2
GLUCOSE SERPL-MCNC: 113 MG/DL (ref 70–110)
HCT VFR BLD AUTO: 30.1 % (ref 37–48.5)
HGB BLD-MCNC: 9.6 G/DL (ref 12–16)
IMM GRANULOCYTES # BLD AUTO: 0.03 K/UL (ref 0–0.04)
IMM GRANULOCYTES NFR BLD AUTO: 0.4 % (ref 0–0.5)
LYMPHOCYTES # BLD AUTO: 1 K/UL (ref 1–4.8)
LYMPHOCYTES NFR BLD: 13.7 % (ref 18–48)
MCH RBC QN AUTO: 32.5 PG (ref 27–31)
MCHC RBC AUTO-ENTMCNC: 31.9 G/DL (ref 32–36)
MCV RBC AUTO: 102 FL (ref 82–98)
MOLECULAR STREP A: NEGATIVE
MONOCYTES # BLD AUTO: 0.6 K/UL (ref 0.3–1)
MONOCYTES NFR BLD: 8.3 % (ref 4–15)
NEUTROPHILS # BLD AUTO: 5.6 K/UL (ref 1.8–7.7)
NEUTROPHILS NFR BLD: 75.4 % (ref 38–73)
NRBC BLD-RTO: 0 /100 WBC
PLATELET # BLD AUTO: 340 K/UL (ref 150–450)
PMV BLD AUTO: 9.3 FL (ref 9.2–12.9)
POC MOLECULAR INFLUENZA A AGN: NEGATIVE
POC MOLECULAR INFLUENZA B AGN: NEGATIVE
POTASSIUM SERPL-SCNC: 3.3 MMOL/L (ref 3.5–5.1)
PROT SERPL-MCNC: 7.4 G/DL (ref 6–8.4)
RBC # BLD AUTO: 2.95 M/UL (ref 4–5.4)
SARS-COV-2 RDRP RESP QL NAA+PROBE: NEGATIVE
SODIUM SERPL-SCNC: 142 MMOL/L (ref 136–145)
TROPONIN I SERPL DL<=0.01 NG/ML-MCNC: 0.33 NG/ML (ref 0–0.03)
WBC # BLD AUTO: 7.43 K/UL (ref 3.9–12.7)

## 2022-10-25 PROCEDURE — 93005 ELECTROCARDIOGRAM TRACING: CPT

## 2022-10-25 PROCEDURE — 99285 EMERGENCY DEPT VISIT HI MDM: CPT | Mod: 25

## 2022-10-25 PROCEDURE — 83880 ASSAY OF NATRIURETIC PEPTIDE: CPT | Performed by: EMERGENCY MEDICINE

## 2022-10-25 PROCEDURE — 96374 THER/PROPH/DIAG INJ IV PUSH: CPT

## 2022-10-25 PROCEDURE — 80053 COMPREHEN METABOLIC PANEL: CPT | Performed by: EMERGENCY MEDICINE

## 2022-10-25 PROCEDURE — 25000003 PHARM REV CODE 250: Performed by: EMERGENCY MEDICINE

## 2022-10-25 PROCEDURE — 84484 ASSAY OF TROPONIN QUANT: CPT | Performed by: EMERGENCY MEDICINE

## 2022-10-25 PROCEDURE — 93010 ELECTROCARDIOGRAM REPORT: CPT | Mod: ,,, | Performed by: INTERNAL MEDICINE

## 2022-10-25 PROCEDURE — 87635 SARS-COV-2 COVID-19 AMP PRB: CPT | Performed by: EMERGENCY MEDICINE

## 2022-10-25 PROCEDURE — 87502 INFLUENZA DNA AMP PROBE: CPT

## 2022-10-25 PROCEDURE — 93010 EKG 12-LEAD: ICD-10-PCS | Mod: ,,, | Performed by: INTERNAL MEDICINE

## 2022-10-25 PROCEDURE — 85025 COMPLETE CBC W/AUTO DIFF WBC: CPT | Performed by: EMERGENCY MEDICINE

## 2022-10-25 PROCEDURE — 63600175 PHARM REV CODE 636 W HCPCS: Performed by: EMERGENCY MEDICINE

## 2022-10-25 RX ORDER — OXYCODONE AND ACETAMINOPHEN 5; 325 MG/1; MG/1
1 TABLET ORAL EVERY 4 HOURS PRN
Qty: 18 TABLET | Refills: 0 | Status: ON HOLD | OUTPATIENT
Start: 2022-10-25 | End: 2022-11-06 | Stop reason: HOSPADM

## 2022-10-25 RX ORDER — PROMETHAZINE HYDROCHLORIDE 25 MG/1
25 TABLET ORAL EVERY 6 HOURS PRN
Qty: 15 TABLET | Refills: 0 | Status: ON HOLD | OUTPATIENT
Start: 2022-10-25 | End: 2023-07-29 | Stop reason: HOSPADM

## 2022-10-25 RX ORDER — PREDNISONE 20 MG/1
40 TABLET ORAL DAILY
Qty: 10 TABLET | Refills: 0 | Status: ON HOLD | OUTPATIENT
Start: 2022-10-25 | End: 2022-11-06 | Stop reason: HOSPADM

## 2022-10-25 RX ORDER — PROMETHAZINE HYDROCHLORIDE 25 MG/1
25 TABLET ORAL
Status: COMPLETED | OUTPATIENT
Start: 2022-10-25 | End: 2022-10-25

## 2022-10-25 RX ORDER — DEXAMETHASONE SODIUM PHOSPHATE 4 MG/ML
8 INJECTION, SOLUTION INTRA-ARTICULAR; INTRALESIONAL; INTRAMUSCULAR; INTRAVENOUS; SOFT TISSUE
Status: COMPLETED | OUTPATIENT
Start: 2022-10-25 | End: 2022-10-25

## 2022-10-25 RX ADMIN — DEXAMETHASONE SODIUM PHOSPHATE 8 MG: 4 INJECTION INTRA-ARTICULAR; INTRALESIONAL; INTRAMUSCULAR; INTRAVENOUS; SOFT TISSUE at 06:10

## 2022-10-25 RX ADMIN — PROMETHAZINE HYDROCHLORIDE 25 MG: 25 TABLET ORAL at 06:10

## 2022-10-25 NOTE — ED PROVIDER NOTES
Encounter Date: 10/25/2022    SCRIBE #1 NOTE: I, Katheryn Nunez, am scribing for, and in the presence of,  Leeroy Nix MD. I have scribed the following portions of the note - Other sections scribed: HPI, ROS.     History     Chief Complaint   Patient presents with    Chest Pain     Pt c/o left sided intermittent, non radiating CP, vomiting x 1 and sore throat starting this morning after dialysis. Normally dialysis T,TH, S.      Eva Bloom is a 57 y.o. female, with a PMHx of HTN, Anemia, Renal disorder, CHF, Kidney cancer, and Hyperuricemia, who presents to the ED with intermittent non-radiating left-sided chest pain for the past week. Patient reports she has a cough and the cough plus inhalations exacerbate the chest pain. Patient is also complaining of sore throat all morning, BLE pain, and vomiting x 3. Patient is on dialysis and her last treatment was this morning. No other exacerbating or alleviating factors. Patient denies shortness of breath, fever, abdominal pain, diarrhea, dysuria, headaches, congestion, eye pain, ear pain, rash, leg swelling, DVT, coughing blood, blood clots in the lungs, or other associated symptoms.        The history is provided by the patient. No  was used.   Review of patient's allergies indicates:   Allergen Reactions    Coreg [carvedilol] Other (See Comments)     Nausea/vomiting    Allopurinol      Other reaction(s): abnormal transaminases     Past Medical History:   Diagnosis Date    Anemia     Bronchitis 03/01/2017    Cancer 2016    kidney cancer    CHF (congestive heart failure), NYHA class II, chronic, systolic     CMV (cytomegalovirus) antibody positive     Essential hypertension 9/23/2015    H/O herpes simplex type 2 infection     Herpes simplex type 1 antibody positive     History of kidney cancer     s/p left nephrectomy 1/2016    Hyperparathyroidism, unspecified     Hyperuricemia without signs of inflammatory arthritis and tophaceous disease      Kidney stones     LGSIL (low grade squamous intraepithelial dysplasia)     Myocardiopathy 7/21/2017    Prediabetes     Proteinuria     Renal disorder     Thyroid nodule     Urate nephropathy      Past Surgical History:   Procedure Laterality Date    BREAST CYST EXCISION      COLONOSCOPY N/A 11/12/2015    COLONOSCOPY N/A 3/12/2021    Procedure: COLONOSCOPY;  Surgeon: Brendon Lanier MD;  Location: [facility];  Service: Endoscopy;  Laterality: N/A;  covid test 3/9, labs prior, prep instr mailed -ml    INSERTION OF BIVENTRICULAR IMPLANTABLE CARDIOVERTER-DEFIBRILLATOR (ICD)  04/2021    NEPHRECTOMY-LAPAROSCOPIC Left 01/12/2016    PERITONEAL CATHETER INSERTION      Permacath insertion  01/12/2017    SALPINGOOPHORECTOMY Right 2016    KJB---DAVINCI    TONSILLECTOMY      TUBAL LIGATION       Family History   Problem Relation Age of Onset    Hypertension Mother     Diabetes Father     Kidney disease Father     No Known Problems Son     No Known Problems Son     Diabetes Maternal Grandfather     No Known Problems Sister     No Known Problems Brother     No Known Problems Maternal Grandmother     No Known Problems Paternal Grandmother     No Known Problems Paternal Grandfather     Heart disease Sister     No Known Problems Sister     No Known Problems Brother     No Known Problems Maternal Aunt     No Known Problems Maternal Uncle     No Known Problems Paternal Aunt     No Known Problems Paternal Uncle     Breast cancer Neg Hx     Colon cancer Neg Hx     Cancer Neg Hx     Stroke Neg Hx     Amblyopia Neg Hx     Blindness Neg Hx     Cataracts Neg Hx     Glaucoma Neg Hx     Macular degeneration Neg Hx     Retinal detachment Neg Hx     Strabismus Neg Hx     Thyroid disease Neg Hx      Social History     Tobacco Use    Smoking status: Never    Smokeless tobacco: Never   Substance Use Topics    Alcohol use: No     Comment: . 2 children. works at [employer].    Drug use: No     Review of Systems   Constitutional:  Positive  for chills. Negative for diaphoresis and fever.   HENT:  Positive for sore throat. Negative for congestion and ear pain.    Eyes:  Negative for pain and visual disturbance.   Respiratory:  Positive for cough. Negative for shortness of breath.    Cardiovascular:  Positive for chest pain (left-sided). Negative for leg swelling.   Gastrointestinal:  Positive for nausea and vomiting. Negative for abdominal pain and diarrhea.   Genitourinary:  Negative for dysuria.   Musculoskeletal:  Positive for myalgias (BLE).   Skin:  Negative for rash.   Neurological:  Negative for headaches.     Physical Exam     Initial Vitals [10/25/22 1036]   BP Pulse Resp Temp SpO2   (!) 177/98 107 18 98.8 °F (37.1 °C) 96 %      MAP       --         Physical Exam  The patient was examined specifically for the following:   General:No significant distress, Good color, Warm and dry. Head and neck:Scalp atraumatic, Neck supple. Neurological:Appropriate conversation, Gross motor deficits. Eyes:Conjugate gaze, Clear corneas. ENT: No epistaxis. Cardiac: Regular rate and rhythm, Grossly normal heart tones. Pulmonary: Wheezing, Rales. Gastrointestinal: Abdominal tenderness, Abdominal distention. Musculoskeletal: Extremity deformity, Apparent pain with range of motion of the joints. Skin: Rash.   The findings on examination were normal except for the following:  The pharynx is only slightly red.  Lungs are clear and free of wheezing rales rubs or rhonchi.  Heart tones are normal.  The patient has regular rate and rhythm.  The abdomen is nontender.  There is no guarding rebound mass or distention.  The left pectoral chest is exquisitely tender run the pacemaker.  There is no erythema warmth or evidence of infection.  Patient has mild pedal edema bilaterally.  Patient's heart rate is 107.  There is no clinical evidence of respiratory distress.  ED Course   Procedures  Labs Reviewed   CBC W/ AUTO DIFFERENTIAL - Abnormal; Notable for the following  "components:       Result Value    RBC 2.95 (*)     Hemoglobin 9.6 (*)     Hematocrit 30.1 (*)      (*)     MCH 32.5 (*)     MCHC 31.9 (*)     RDW 19.9 (*)     Gran % 75.4 (*)     Lymph % 13.7 (*)     All other components within normal limits   COMPREHENSIVE METABOLIC PANEL - Abnormal; Notable for the following components:    Potassium 3.3 (*)     Glucose 113 (*)     BUN 22 (*)     Creatinine 3.8 (*)     Albumin 3.1 (*)     Total Bilirubin 1.5 (*)     Alkaline Phosphatase 257 (*)     AST 53 (*)     eGFR 13 (*)     All other components within normal limits   TROPONIN I - Abnormal; Notable for the following components:    Troponin I 0.327 (*)     All other components within normal limits   B-TYPE NATRIURETIC PEPTIDE - Abnormal; Notable for the following components:    BNP >4,900 (*)     All other components within normal limits   SARS-COV-2 RDRP GENE   POCT STREP A MOLECULAR   POCT INFLUENZA A/B MOLECULAR     EKG Readings: (Independently Interpreted)   This patient is in a sinus tachycardia with a heart rate of 104.  There are nonspecific T-wave changes.  There are no ST segment changes.  The patient has right axis deviation.  This patient may have left ventricular hypertrophy.     Imaging Results              X-Ray Chest PA And Lateral (Final result)  Result time 10/25/22 11:51:38      Final result by Edmond Queen MD (10/25/22 11:51:38)                   Impression:      Cardiomegaly and possible mild interstitial edema.  No detrimental change when compared with 06/28/2022.      Electronically signed by: Edmond Queen MD  Date:    10/25/2022  Time:    11:51               Narrative:    EXAMINATION:  XR CHEST PA AND LATERAL    CLINICAL HISTORY:  Provided history is "Chest Pain;  ".    TECHNIQUE:  Frontal and lateral views of the chest were performed.    COMPARISON:  06/28/2022.    FINDINGS:  Cardiomediastinal silhouette is enlarged and similar to the prior study.  Left chest wall AICD is present with " similar positioning of percutaneous lead.  Atherosclerotic calcifications overlie the aortic arch.  Central vascular congestion with coarsened perihilar and lower lung zone interstitial lung markings.  Possible trace left pleural effusion.  No sizable pleural effusion.  No pneumothorax.  No detrimental change when compared with the prior study.  Left axillary vascular stent again noted.                                       Medications   dexAMETHasone injection 8 mg (has no administration in time range)   promethazine tablet 25 mg (has no administration in time range)     Medical Decision Making:   History:   Old Medical Records: I decided to obtain old medical records.  Clinical Tests:   Lab Tests: Ordered and Reviewed  Radiological Study: Ordered and Reviewed     Medical decision making:  Given the above, this patient presents to the emergency room with complaints of a cough sore throat nausea and vomiting.  Patient has end-stage renal disease.  She is on he of dialysis.  She has metastatic renal cell carcinoma.  She is mostly bothered by pain in her throat.  There is no stridor.  There is no difficulty swallowing or breathing.  There is no lymphadenopathy or palpable mass.  Patient is currently on Percocet.  I will refill a prescription.  I will try her on a short course of steroids.  I find no fever.  Chest x-ray is stable.  Patient has an elevated troponin.  I reviewed this with Dr. Anne.  The patient has a nonischemic cardiomyopathy in renal failure, thought to be the cause of the elevated troponins The patient has had elevated troponins like this in the past.  Chest pain it is left-sided.  Left-sided chest is tender.  We will discharge to outpatient evaluation and treatment.  Patient is coughing and has nausea and vomiting.  I will try treating with a short course of steroids.  I will treat for pain.  I will treat nausea and vomiting.       Scribe Attestation:   Scribe #1: I performed the above scribed  service and the documentation accurately describes the services I performed. I attest to the accuracy of the note.                   Clinical Impression:   Final diagnoses:  [R07.9] Chest pain  [R07.89] Chest pain, non-cardiac (Primary)  [R77.8] Elevated troponin  [I42.9] Cardiomyopathy, unspecified type - Nonischemic  [J02.9] Acute pharyngitis, unspecified etiology  [J06.9] Viral URI with cough        ED Disposition Condition    Discharge Stable        I personally performed the services described in this documentation.  All medical record  entries made by the scribe are at my direction and in my presence.  Signed, Dr. Nix I personally performed the services described in this documentation.  All medical record  entries made by the scribe are at my direction and in my presence.  Signed, Dr. Nix  ED Prescriptions       Medication Sig Dispense Start Date End Date Auth. Provider    predniSONE (DELTASONE) 20 MG tablet Take 2 tablets (40 mg total) by mouth once daily. for 4 days 10 tablet 10/25/2022 10/29/2022 Leeroy Nix MD    oxyCODONE-acetaminophen (PERCOCET) 5-325 mg per tablet Take 1 tablet by mouth every 4 (four) hours as needed. 18 tablet 10/25/2022 -- Leeroy Nix MD    promethazine (PHENERGAN) 25 MG tablet Take 1 tablet (25 mg total) by mouth every 6 (six) hours as needed for Nausea. 15 tablet 10/25/2022 -- Leeroy Nix MD          Follow-up Information       Follow up With Specialties Details Why Contact Info    Sowmya Mccain MD Internal Medicine, Pediatrics In 3 days  [address] [place] [address]  [phone]               Leeroy Nix MD  10/25/22 1819       Leeroy Nix MD  10/25/22 182

## 2022-10-25 NOTE — ED NOTES
Patient c/o sore throat, cough,fever, chills, starting this ТАТЬНЯА,and left sided chest pain starting approximately one week ago.

## 2022-10-25 NOTE — DISCHARGE INSTRUCTIONS
Medicines as directed.  Please return immediately if you get worse or if new problems develop.  Please follow-up with your primary care doctor this week.  Rest.  Zofran Phenergan for nausea.  Percocet for pain.  Return immediately if you get worse or if new problems develop.

## 2022-10-25 NOTE — ED NOTES
Patient requested soemthing to eat. EMD stated she could have something to eat. Patient given pudding and applesauce.

## 2022-10-26 ENCOUNTER — OFFICE VISIT (OUTPATIENT)
Dept: FAMILY MEDICINE | Facility: CLINIC | Age: 57
DRG: 640 | End: 2022-10-26
Payer: MEDICARE

## 2022-10-26 VITALS
SYSTOLIC BLOOD PRESSURE: 152 MMHG | HEIGHT: 64 IN | TEMPERATURE: 99 F | DIASTOLIC BLOOD PRESSURE: 80 MMHG | HEART RATE: 115 BPM | BODY MASS INDEX: 19.72 KG/M2 | OXYGEN SATURATION: 95 % | WEIGHT: 115.5 LBS

## 2022-10-26 DIAGNOSIS — I42.8 CARDIOMYOPATHY, NONISCHEMIC: ICD-10-CM

## 2022-10-26 DIAGNOSIS — Z09 HOSPITAL DISCHARGE FOLLOW-UP: Primary | ICD-10-CM

## 2022-10-26 DIAGNOSIS — Z99.2 ESRD (END STAGE RENAL DISEASE) ON DIALYSIS: ICD-10-CM

## 2022-10-26 DIAGNOSIS — D62 ACUTE BLOOD LOSS ANEMIA: ICD-10-CM

## 2022-10-26 DIAGNOSIS — N18.6 ESRD (END STAGE RENAL DISEASE) ON DIALYSIS: ICD-10-CM

## 2022-10-26 DIAGNOSIS — I48.0 PAF (PAROXYSMAL ATRIAL FIBRILLATION): ICD-10-CM

## 2022-10-26 DIAGNOSIS — I51.89 COMBINED SYSTOLIC AND DIASTOLIC CARDIAC DYSFUNCTION: ICD-10-CM

## 2022-10-26 DIAGNOSIS — I10 ESSENTIAL HYPERTENSION: ICD-10-CM

## 2022-10-26 DIAGNOSIS — I50.22 CHF (CONGESTIVE HEART FAILURE), NYHA CLASS II, CHRONIC, SYSTOLIC: ICD-10-CM

## 2022-10-26 DIAGNOSIS — D63.1 ANEMIA IN ESRD (END-STAGE RENAL DISEASE): ICD-10-CM

## 2022-10-26 DIAGNOSIS — Z23 FLU VACCINE NEED: ICD-10-CM

## 2022-10-26 DIAGNOSIS — N18.6 ANEMIA IN ESRD (END-STAGE RENAL DISEASE): ICD-10-CM

## 2022-10-26 PROCEDURE — 99215 OFFICE O/P EST HI 40 MIN: CPT | Mod: S$PBB,,, | Performed by: INTERNAL MEDICINE

## 2022-10-26 PROCEDURE — 99215 OFFICE O/P EST HI 40 MIN: CPT | Mod: PBBFAC,PO | Performed by: INTERNAL MEDICINE

## 2022-10-26 PROCEDURE — 99999 PR PBB SHADOW E&M-EST. PATIENT-LVL V: ICD-10-PCS | Mod: PBBFAC,,, | Performed by: INTERNAL MEDICINE

## 2022-10-26 PROCEDURE — 99999 PR PBB SHADOW E&M-EST. PATIENT-LVL V: CPT | Mod: PBBFAC,,, | Performed by: INTERNAL MEDICINE

## 2022-10-26 PROCEDURE — 99215 PR OFFICE/OUTPT VISIT, EST, LEVL V, 40-54 MIN: ICD-10-PCS | Mod: S$PBB,,, | Performed by: INTERNAL MEDICINE

## 2022-10-26 RX ORDER — CLONIDINE 0.3 MG/24H
1 PATCH, EXTENDED RELEASE TRANSDERMAL
Qty: 4 PATCH | Refills: 11 | Status: ON HOLD | OUTPATIENT
Start: 2022-10-26 | End: 2024-01-05 | Stop reason: HOSPADM

## 2022-10-26 RX ORDER — SACUBITRIL AND VALSARTAN 49; 51 MG/1; MG/1
1 TABLET, FILM COATED ORAL 2 TIMES DAILY
Qty: 60 TABLET | Refills: 11 | Status: CANCELLED | OUTPATIENT
Start: 2022-10-26

## 2022-10-26 NOTE — PROGRESS NOTES
274}  HISTORY OF PRESENT ILLNESS:  Eva Bloom is a 57 y.o. female who presents to the clinic today for Hospital Follow Up  .     The patient is accompanied today by her .  The patient was seen in the clinic today for follow-up of a recent hospitalization for severe anemia.  She had had some routine blood work done which showed severe anemia which prompted an overnight hospital stay for transfusion.  She continues on dialysis on Tuesday/Thursday/Saturday.   Her  reports that yesterday she was not feeling well after dialysis.  Apparently she was having severe throat pain and left-sided chest pain.  They went to the emergency room where she had an episode of vomiting.  She was given Phenergan in the emergency room which helped.  It has made her quite sleepy.  She reports that she continues with some throat pain which is resulting in decreased appetite.  In the emergency room she tested negative for COVID, flu, and strep throat.  She has been experiencing some mild nausea and vomiting off and on for a while.  Of note she has had some weight loss in the past year.      PAST MEDICAL HISTORY:  Past Medical History:   Diagnosis Date    Anemia     Bronchitis 03/01/2017    Cancer 2016    kidney cancer    CHF (congestive heart failure), NYHA class II, chronic, systolic     CMV (cytomegalovirus) antibody positive     Essential hypertension 9/23/2015    H/O herpes simplex type 2 infection     Herpes simplex type 1 antibody positive     History of kidney cancer     s/p left nephrectomy 1/2016    Hyperparathyroidism, unspecified     Hyperuricemia without signs of inflammatory arthritis and tophaceous disease     Kidney stones     LGSIL (low grade squamous intraepithelial dysplasia)     Myocardiopathy 7/21/2017    Prediabetes     Proteinuria     Renal disorder     Thyroid nodule     Urate nephropathy        PAST SURGICAL HISTORY:  Past Surgical History:   Procedure Laterality Date    BREAST CYST EXCISION       COLONOSCOPY N/A 11/12/2015    COLONOSCOPY N/A 3/12/2021    Procedure: COLONOSCOPY;  Surgeon: Brendon Lanier MD;  Location: [facility];  Service: Endoscopy;  Laterality: N/A;  covid test 3/9, labs prior, prep instr mailed -ml    INSERTION OF BIVENTRICULAR IMPLANTABLE CARDIOVERTER-DEFIBRILLATOR (ICD)  04/2021    NEPHRECTOMY-LAPAROSCOPIC Left 01/12/2016    PERITONEAL CATHETER INSERTION      Permacath insertion  01/12/2017    SALPINGOOPHORECTOMY Right 2016    KJB---DAVINCI    TONSILLECTOMY      TUBAL LIGATION         SOCIAL HISTORY:  Social History     Socioeconomic History    Marital status:     Number of children: 2   Occupational History    Occupation: [employer]     Employer: [id number]   Tobacco Use    Smoking status: Never    Smokeless tobacco: Never   Substance and Sexual Activity    Alcohol use: No     Comment: . 2 children. works at [employer].    Drug use: No    Sexual activity: Yes     Partners: Male     Social Determinants of Health     Financial Resource Strain: Low Risk     Difficulty of Paying Living Expenses: Not hard at all   Food Insecurity: No Food Insecurity    Worried About Running Out of Food in the Last Year: Never true    Ran Out of Food in the Last Year: Never true   Transportation Needs: No Transportation Needs    Lack of Transportation (Medical): No    Lack of Transportation (Non-Medical): No   Physical Activity: Insufficiently Active    Days of Exercise per Week: 3 days    Minutes of Exercise per Session: 30 min   Stress: No Stress Concern Present    Feeling of Stress : Not at all   Social Connections: Socially Integrated    Frequency of Communication with Friends and Family: More than three times a week    Frequency of Social Gatherings with Friends and Family: Once a week    Attends Confucianism Services: 1 to 4 times per year    Active Member of Clubs or Organizations: Yes    Attends Club or Organization Meetings: 1 to 4 times per year    Marital Status:    Housing  Stability: Low Risk     Unable to Pay for Housing in the Last Year: No    Number of Places Lived in the Last Year: 1    Unstable Housing in the Last Year: No       FAMILY HISTORY:  Family History   Problem Relation Age of Onset    Hypertension Mother     Diabetes Father     Kidney disease Father     No Known Problems Son     No Known Problems Son     Diabetes Maternal Grandfather     No Known Problems Sister     No Known Problems Brother     No Known Problems Maternal Grandmother     No Known Problems Paternal Grandmother     No Known Problems Paternal Grandfather     Heart disease Sister     No Known Problems Sister     No Known Problems Brother     No Known Problems Maternal Aunt     No Known Problems Maternal Uncle     No Known Problems Paternal Aunt     No Known Problems Paternal Uncle     Breast cancer Neg Hx     Colon cancer Neg Hx     Cancer Neg Hx     Stroke Neg Hx     Amblyopia Neg Hx     Blindness Neg Hx     Cataracts Neg Hx     Glaucoma Neg Hx     Macular degeneration Neg Hx     Retinal detachment Neg Hx     Strabismus Neg Hx     Thyroid disease Neg Hx        ALLERGIES AND MEDICATIONS: updated and reviewed.  Review of patient's allergies indicates:   Allergen Reactions    Coreg [carvedilol] Other (See Comments)     Nausea/vomiting    Allopurinol      Other reaction(s): abnormal transaminases     Medication List with Changes/Refills   Current Medications    ACETAMINOPHEN (TYLENOL) 500 MG TABLET    Take 500 mg by mouth every 6 (six) hours as needed for Pain.    APIXABAN (ELIQUIS) 2.5 MG TAB    Take 1 tablet (2.5 mg total) by mouth 2 (two) times daily.    CABOMETYX 60 MG TAB    TAKE ONE TABLET BY MOUTH ONCE DAILY AT THE SAME TIME ON AN EMPTY STOMACH AT LEAST 1 HOUR BEFORE OR 2 HOURS AFTER EATING. AVOID GRAPEFRUIT PRODUCTS    EPOETIN DAVID-EPBX (RETACRIT INJ)    Epoetin david - epbx (Retacrit)    HYDRALAZINE (APRESOLINE) 100 MG TABLET    Take 1 tablet (100 mg total) by mouth every 12 (twelve) hours.     LIDOCAINE-PRILOCAINE (EMLA) CREAM    APPLY ATLEAST 30 MINUTES BEFORE TREATMENT 3 TIMES A WEEK    METOPROLOL SUCCINATE (TOPROL-XL) 100 MG 24 HR TABLET    Take 1 tablet (100 mg total) by mouth once daily.    MV,CA,MIN-FOLIC ACID-VIT K1 (ONE-A-DAY WOMEN'S 50 PLUS) 400-20 MCG TAB    Take 1 tablet by mouth.    ONDANSETRON (ZOFRAN-ODT) 8 MG TBDL    Take 1 tablet (8 mg total) by mouth every 12 (twelve) hours as needed.    OXYCODONE-ACETAMINOPHEN (PERCOCET) 5-325 MG PER TABLET    Take 1 tablet by mouth every 6 (six) hours as needed for Pain.    OXYCODONE-ACETAMINOPHEN (PERCOCET) 5-325 MG PER TABLET    Take 1 tablet by mouth every 4 (four) hours as needed.    PREDNISONE (DELTASONE) 20 MG TABLET    Take 2 tablets (40 mg total) by mouth once daily. for 4 days    PROMETHAZINE (PHENERGAN) 25 MG TABLET    Take 1 tablet (25 mg total) by mouth every 6 (six) hours as needed for Nausea.    SACUBITRIL-VALSARTAN (ENTRESTO) 49-51 MG PER TABLET    Take 1 tablet by mouth 2 (two) times daily.    SEVELAMER CARBONATE (RENVELA) 800 MG TAB    TAKE 2 TABLETS (1,600 MG TOTAL) BY MOUTH 3 (THREE) TIMES DAILY WITH MEALS.    SODIUM CHLORIDE 0.9% SOLP 100 ML WITH IRON SUCROSE 100 MG IRON/5 ML SOLN 100 MG    50 mg.   Changed and/or Refilled Medications    Modified Medication Previous Medication    CLONIDINE 0.3 MG/24 HR TD PTWK (CATAPRES) 0.3 MG/24 HR cloNIDine 0.3 mg/24 hr td ptwk (CATAPRES) 0.3 mg/24 hr       Place 1 patch onto the skin every 7 days.    Place 1 patch onto the skin every 7 days.         CARE TEAM:  Patient Care Team:  Sowmya Mccain MD as PCP - General (Internal Medicine)  Ben Rivera MD as Consulting Physician (Hematology and Oncology)  Leora Louis MD (Cardiology)  Williams Hart MD (Inactive) as Consulting Physician (Nephrology)  Lulu Le LPN as Licensed Practical Nurse         REVIEW OF SYSTEMS:  Review of Systems   Constitutional:  Positive for fatigue. Negative for chills and fever.   HENT:  Positive  "for sore throat. Negative for congestion.    Respiratory:  Negative for shortness of breath.    Cardiovascular:  Positive for chest pain. Negative for leg swelling.   Gastrointestinal:  Positive for nausea and vomiting. Negative for abdominal pain.       PHYSICAL EXAM: 274}  Vitals:    10/26/22 1453   BP: (!) 152/80   Pulse: (!) 115   Temp: 98.6 °F (37 °C)     Weight: 52.4 kg (115 lb 8.3 oz)   Height: 5' 4" (162.6 cm)   Body mass index is 19.83 kg/m².      General appearance - slightly sleepy but easily aroused; oriented to person, place and time; chronically ill appearing  Psychiatric - normal behavior, speech, dress, motor activity  Eyes - pupils equal and reactive, extraocular eye movements intact, sclera anicteric  Mouth - pharynx normal without lesions noted; slightly tacky mucus noted  Neck - supple, no significant adenopathy, carotids upstroke normal bilaterally, no bruits  Lymphatics - no palpable cervical lymphadenopathy  Chest - clear to auscultation, no wheezes, rales or rhonchi, symmetric air entry  Heart - tachycardia with regular rhythm, no murmurs noted  Musculoskeletal - no joint tenderness, deformity or swelling, no muscular tenderness noted, poor muscle mass  Extremities - no pedal edema noted  Skin - normal coloration, no suspicious skin lesions      Labs:  No labs needed at this time      ASSESSMENT AND PLAN:  274}  1. Hospital discharge follow-up/2. Acute blood loss anemia  She was recently hospitalized for acute blood loss anemia with transfusion of 2 pt of blood.  Her blood count has remained stable since then.  Observe.    3. Anemia in ESRD (end-stage renal disease)  The current medical regimen is effective;  continue present plan and medications.   Followed by: Nephrology.       4. CHF (congestive heart failure), NYHA class II, chronic, systolic/5. Cardiomyopathy, nonischemic  The current medical regimen is effective;  continue present plan and medications.   Followed by: Cardiology. "     Overview:  - s/p ICD      6. Essential hypertension  Blood pressure was slightly elevated in the office today.  Medication changes not made as she is dealing with an acute illness.  Continue current medication regimen for now.    7. PAF (paroxysmal atrial fibrillation)  The current medical regimen is effective;  continue present plan and medications.   Followed by: Cardiology.       8. ESRD (end stage renal disease) on dialysis  The current medical regimen is effective;  continue present plan and medications.   Followed by: Nephrology.     Overview:  - Tuesday, Thursday, and Saturday  - Chelsie Hart, nephrology      9. Flu vaccine need  Previously completed. Chart updated.    I discussed with the patient and her  that she may be having a viral illness.  The steroid should help with appetite and inflammation and sore throat.  Phenergan should help with nausea, although it may cause some sleepiness.  She was also given pain medication.  I did encourage a little bit more fluids.  She can try some protein supplements as this will give her some nutrition along with hydration.  She will continue with her usual dialysis routine.  She will return to the emergency room for any acute changes in her medical condition.        No orders of the defined types were placed in this encounter.     Follow up in about 3 months (around 1/26/2023), or if symptoms worsen or fail to improve, for follow up chronic medical conditions.. or sooner as needed.    I spent a total of 40 minutes on the day of the visit.  This includes face to face time and non-face to face time preparing to see the patient (eg, review of tests), obtaining and/or reviewing separately obtained history, documenting clinical information in the electronic or other health record, independently interpreting results and communicating results to the patient/family/caregiver, or care coordinator.

## 2022-10-27 RX ORDER — SACUBITRIL AND VALSARTAN 49; 51 MG/1; MG/1
1 TABLET, FILM COATED ORAL 2 TIMES DAILY
Qty: 60 TABLET | Refills: 11 | Status: SHIPPED | OUTPATIENT
Start: 2022-10-27 | End: 2023-07-19 | Stop reason: CLARIF

## 2022-10-28 ENCOUNTER — TELEPHONE (OUTPATIENT)
Dept: FAMILY MEDICINE | Facility: CLINIC | Age: 57
End: 2022-10-28
Payer: MEDICARE

## 2022-10-29 ENCOUNTER — ANESTHESIA EVENT (OUTPATIENT)
Dept: INTENSIVE CARE | Facility: HOSPITAL | Age: 57
DRG: 640 | End: 2022-10-29
Payer: MEDICARE

## 2022-10-29 ENCOUNTER — ANESTHESIA (OUTPATIENT)
Dept: INTENSIVE CARE | Facility: HOSPITAL | Age: 57
DRG: 640 | End: 2022-10-29
Payer: MEDICARE

## 2022-10-29 ENCOUNTER — HOSPITAL ENCOUNTER (INPATIENT)
Facility: HOSPITAL | Age: 57
LOS: 8 days | Discharge: HOME HEALTH | DRG: 640 | End: 2022-11-06
Attending: EMERGENCY MEDICINE | Admitting: HOSPITALIST
Payer: MEDICARE

## 2022-10-29 DIAGNOSIS — R53.81 DEBILITY: ICD-10-CM

## 2022-10-29 DIAGNOSIS — R74.01 TRANSAMINITIS: Primary | ICD-10-CM

## 2022-10-29 DIAGNOSIS — R53.1 WEAKNESS: ICD-10-CM

## 2022-10-29 DIAGNOSIS — I50.41 ACUTE COMBINED SYSTOLIC AND DIASTOLIC HEART FAILURE: ICD-10-CM

## 2022-10-29 DIAGNOSIS — E83.39 HYPERPHOSPHATEMIA: ICD-10-CM

## 2022-10-29 DIAGNOSIS — R79.89 ELEVATED TROPONIN: ICD-10-CM

## 2022-10-29 DIAGNOSIS — A41.9 SEPSIS: ICD-10-CM

## 2022-10-29 PROBLEM — E87.20 LACTIC ACIDOSIS: Status: ACTIVE | Noted: 2022-10-29

## 2022-10-29 LAB
ABO + RH BLD: NORMAL
ALBUMIN SERPL BCP-MCNC: 2.8 G/DL (ref 3.5–5.2)
ALBUMIN SERPL BCP-MCNC: 3 G/DL (ref 3.5–5.2)
ALBUMIN SERPL BCP-MCNC: 4 G/DL (ref 3.5–5.2)
ALLENS TEST: ABNORMAL
ALP SERPL-CCNC: 227 U/L (ref 55–135)
ALP SERPL-CCNC: 249 U/L (ref 55–135)
ALP SERPL-CCNC: 341 U/L (ref 55–135)
ALT SERPL W/O P-5'-P-CCNC: 114 U/L (ref 10–44)
ALT SERPL W/O P-5'-P-CCNC: 173 U/L (ref 10–44)
ALT SERPL W/O P-5'-P-CCNC: 205 U/L (ref 10–44)
AMMONIA PLAS-SCNC: 56 UMOL/L (ref 10–50)
AMMONIA PLAS-SCNC: 71 UMOL/L (ref 10–50)
ANION GAP SERPL CALC-SCNC: 18 MMOL/L (ref 8–16)
ANION GAP SERPL CALC-SCNC: 22 MMOL/L (ref 8–16)
ANION GAP SERPL CALC-SCNC: 34 MMOL/L (ref 8–16)
APAP SERPL-MCNC: <3 UG/ML (ref 10–20)
AST SERPL-CCNC: 295 U/L (ref 10–40)
AST SERPL-CCNC: 475 U/L (ref 10–40)
AST SERPL-CCNC: 542 U/L (ref 10–40)
BASOPHILS # BLD AUTO: 0.01 K/UL (ref 0–0.2)
BASOPHILS # BLD AUTO: 0.01 K/UL (ref 0–0.2)
BASOPHILS NFR BLD: 0.1 % (ref 0–1.9)
BASOPHILS NFR BLD: 0.1 % (ref 0–1.9)
BILIRUB SERPL-MCNC: 2.8 MG/DL (ref 0.1–1)
BILIRUB SERPL-MCNC: 3.2 MG/DL (ref 0.1–1)
BILIRUB SERPL-MCNC: 3.7 MG/DL (ref 0.1–1)
BLD GP AB SCN CELLS X3 SERPL QL: NORMAL
BNP SERPL-MCNC: >4900 PG/ML (ref 0–99)
BUN SERPL-MCNC: 29 MG/DL (ref 6–20)
BUN SERPL-MCNC: 51 MG/DL (ref 6–20)
BUN SERPL-MCNC: 78 MG/DL (ref 6–20)
CALCIUM SERPL-MCNC: 10.6 MG/DL (ref 8.7–10.5)
CALCIUM SERPL-MCNC: 8.7 MG/DL (ref 8.7–10.5)
CALCIUM SERPL-MCNC: 9.9 MG/DL (ref 8.7–10.5)
CHLORIDE SERPL-SCNC: 101 MMOL/L (ref 95–110)
CHLORIDE SERPL-SCNC: 95 MMOL/L (ref 95–110)
CHLORIDE SERPL-SCNC: 95 MMOL/L (ref 95–110)
CO2 SERPL-SCNC: 21 MMOL/L (ref 23–29)
CO2 SERPL-SCNC: 26 MMOL/L (ref 23–29)
CO2 SERPL-SCNC: 8 MMOL/L (ref 23–29)
CREAT SERPL-MCNC: 2.6 MG/DL (ref 0.5–1.4)
CREAT SERPL-MCNC: 4.5 MG/DL (ref 0.5–1.4)
CREAT SERPL-MCNC: 6.1 MG/DL (ref 0.5–1.4)
CTP QC/QA: YES
CTP QC/QA: YES
DELSYS: ABNORMAL
DIFFERENTIAL METHOD: ABNORMAL
DIFFERENTIAL METHOD: ABNORMAL
EOSINOPHIL # BLD AUTO: 0 K/UL (ref 0–0.5)
EOSINOPHIL # BLD AUTO: 0 K/UL (ref 0–0.5)
EOSINOPHIL NFR BLD: 0 % (ref 0–8)
EOSINOPHIL NFR BLD: 0 % (ref 0–8)
ERYTHROCYTE [DISTWIDTH] IN BLOOD BY AUTOMATED COUNT: 20.1 % (ref 11.5–14.5)
ERYTHROCYTE [DISTWIDTH] IN BLOOD BY AUTOMATED COUNT: 21.6 % (ref 11.5–14.5)
EST. GFR  (NO RACE VARIABLE): 11 ML/MIN/1.73 M^2
EST. GFR  (NO RACE VARIABLE): 21 ML/MIN/1.73 M^2
EST. GFR  (NO RACE VARIABLE): 7 ML/MIN/1.73 M^2
EXTRA BLUE TOP RAINBOW: NORMAL
FIO2: 21
GLUCOSE SERPL-MCNC: 176 MG/DL (ref 70–110)
GLUCOSE SERPL-MCNC: 71 MG/DL (ref 70–110)
GLUCOSE SERPL-MCNC: 97 MG/DL (ref 70–110)
HCO3 UR-SCNC: 31.1 MMOL/L (ref 24–28)
HCT VFR BLD AUTO: 26.7 % (ref 37–48.5)
HCT VFR BLD AUTO: 29.8 % (ref 37–48.5)
HGB BLD-MCNC: 8.9 G/DL (ref 12–16)
HGB BLD-MCNC: 9.1 G/DL (ref 12–16)
IMM GRANULOCYTES # BLD AUTO: 0.04 K/UL (ref 0–0.04)
IMM GRANULOCYTES # BLD AUTO: 0.07 K/UL (ref 0–0.04)
IMM GRANULOCYTES NFR BLD AUTO: 0.3 % (ref 0–0.5)
IMM GRANULOCYTES NFR BLD AUTO: 0.5 % (ref 0–0.5)
LACTATE SERPL-SCNC: 5.3 MMOL/L (ref 0.5–2.2)
LACTATE SERPL-SCNC: >12 MMOL/L (ref 0.5–2.2)
LACTATE SERPL-SCNC: >12 MMOL/L (ref 0.5–2.2)
LIPASE SERPL-CCNC: 633 U/L (ref 4–60)
LYMPHOCYTES # BLD AUTO: 0.3 K/UL (ref 1–4.8)
LYMPHOCYTES # BLD AUTO: 0.4 K/UL (ref 1–4.8)
LYMPHOCYTES NFR BLD: 2.1 % (ref 18–48)
LYMPHOCYTES NFR BLD: 2.8 % (ref 18–48)
MCH RBC QN AUTO: 33 PG (ref 27–31)
MCH RBC QN AUTO: 33.8 PG (ref 27–31)
MCHC RBC AUTO-ENTMCNC: 30.5 G/DL (ref 32–36)
MCHC RBC AUTO-ENTMCNC: 33.3 G/DL (ref 32–36)
MCV RBC AUTO: 111 FL (ref 82–98)
MCV RBC AUTO: 99 FL (ref 82–98)
MODE: ABNORMAL
MONOCYTES # BLD AUTO: 0.5 K/UL (ref 0.3–1)
MONOCYTES # BLD AUTO: 0.7 K/UL (ref 0.3–1)
MONOCYTES NFR BLD: 4.2 % (ref 4–15)
MONOCYTES NFR BLD: 4.8 % (ref 4–15)
NEUTROPHILS # BLD AUTO: 11.2 K/UL (ref 1.8–7.7)
NEUTROPHILS # BLD AUTO: 13.4 K/UL (ref 1.8–7.7)
NEUTROPHILS NFR BLD: 91.8 % (ref 38–73)
NEUTROPHILS NFR BLD: 93.3 % (ref 38–73)
NRBC BLD-RTO: 4 /100 WBC
NRBC BLD-RTO: 7 /100 WBC
PCO2 BLDA: 34.8 MMHG (ref 35–45)
PH SMN: 7.56 [PH] (ref 7.35–7.45)
PHOSPHATE SERPL-MCNC: 3.5 MG/DL (ref 2.7–4.5)
PLATELET # BLD AUTO: 232 K/UL (ref 150–450)
PLATELET # BLD AUTO: 271 K/UL (ref 150–450)
PMV BLD AUTO: 10.3 FL (ref 9.2–12.9)
PMV BLD AUTO: 9.6 FL (ref 9.2–12.9)
PO2 BLDA: 53 MMHG (ref 40–60)
POC BE: 8 MMOL/L
POC MOLECULAR INFLUENZA A AGN: NEGATIVE
POC MOLECULAR INFLUENZA B AGN: NEGATIVE
POC SATURATED O2: 91 % (ref 95–100)
POC TCO2: 32 MMOL/L (ref 24–29)
POCT GLUCOSE: 113 MG/DL (ref 70–110)
POCT GLUCOSE: 69 MG/DL (ref 70–110)
POCT GLUCOSE: 86 MG/DL (ref 70–110)
POCT GLUCOSE: 99 MG/DL (ref 70–110)
POTASSIUM SERPL-SCNC: 3.9 MMOL/L (ref 3.5–5.1)
POTASSIUM SERPL-SCNC: 4 MMOL/L (ref 3.5–5.1)
POTASSIUM SERPL-SCNC: 6 MMOL/L (ref 3.5–5.1)
PROCALCITONIN SERPL IA-MCNC: 1.7 NG/ML
PROT SERPL-MCNC: 5.8 G/DL (ref 6–8.4)
PROT SERPL-MCNC: 6.5 G/DL (ref 6–8.4)
PROT SERPL-MCNC: 8.6 G/DL (ref 6–8.4)
RBC # BLD AUTO: 2.69 M/UL (ref 4–5.4)
RBC # BLD AUTO: 2.7 M/UL (ref 4–5.4)
SAMPLE: ABNORMAL
SARS-COV-2 RDRP RESP QL NAA+PROBE: NEGATIVE
SITE: ABNORMAL
SODIUM SERPL-SCNC: 138 MMOL/L (ref 136–145)
SODIUM SERPL-SCNC: 139 MMOL/L (ref 136–145)
SODIUM SERPL-SCNC: 143 MMOL/L (ref 136–145)
T4 FREE SERPL-MCNC: 0.81 NG/DL (ref 0.71–1.51)
TROPONIN I SERPL DL<=0.01 NG/ML-MCNC: 2.9 NG/ML (ref 0–0.03)
TROPONIN I SERPL DL<=0.01 NG/ML-MCNC: 3.42 NG/ML (ref 0–0.03)
TSH SERPL DL<=0.005 MIU/L-ACNC: 5.29 UIU/ML (ref 0.4–4)
WBC # BLD AUTO: 11.97 K/UL (ref 3.9–12.7)
WBC # BLD AUTO: 14.59 K/UL (ref 3.9–12.7)

## 2022-10-29 PROCEDURE — 84439 ASSAY OF FREE THYROXINE: CPT | Performed by: STUDENT IN AN ORGANIZED HEALTH CARE EDUCATION/TRAINING PROGRAM

## 2022-10-29 PROCEDURE — 76937 CENTRAL LINE: ICD-10-PCS | Mod: 26,,, | Performed by: ANESTHESIOLOGY

## 2022-10-29 PROCEDURE — 84443 ASSAY THYROID STIM HORMONE: CPT | Performed by: STUDENT IN AN ORGANIZED HEALTH CARE EDUCATION/TRAINING PROGRAM

## 2022-10-29 PROCEDURE — 20000000 HC ICU ROOM

## 2022-10-29 PROCEDURE — 63600175 PHARM REV CODE 636 W HCPCS: Performed by: HOSPITALIST

## 2022-10-29 PROCEDURE — 25000003 PHARM REV CODE 250: Performed by: HOSPITALIST

## 2022-10-29 PROCEDURE — 86901 BLOOD TYPING SEROLOGIC RH(D): CPT | Performed by: EMERGENCY MEDICINE

## 2022-10-29 PROCEDURE — 80053 COMPREHEN METABOLIC PANEL: CPT | Mod: 91 | Performed by: EMERGENCY MEDICINE

## 2022-10-29 PROCEDURE — 82746 ASSAY OF FOLIC ACID SERUM: CPT | Performed by: STUDENT IN AN ORGANIZED HEALTH CARE EDUCATION/TRAINING PROGRAM

## 2022-10-29 PROCEDURE — 96365 THER/PROPH/DIAG IV INF INIT: CPT

## 2022-10-29 PROCEDURE — 82140 ASSAY OF AMMONIA: CPT | Performed by: HOSPITALIST

## 2022-10-29 PROCEDURE — 80100014 HC HEMODIALYSIS 1:1

## 2022-10-29 PROCEDURE — 80053 COMPREHEN METABOLIC PANEL: CPT | Mod: 91 | Performed by: STUDENT IN AN ORGANIZED HEALTH CARE EDUCATION/TRAINING PROGRAM

## 2022-10-29 PROCEDURE — 36556 INSERT NON-TUNNEL CV CATH: CPT | Mod: 59,,, | Performed by: ANESTHESIOLOGY

## 2022-10-29 PROCEDURE — 87502 INFLUENZA DNA AMP PROBE: CPT | Mod: 59

## 2022-10-29 PROCEDURE — 80053 COMPREHEN METABOLIC PANEL: CPT | Performed by: HOSPITALIST

## 2022-10-29 PROCEDURE — 82962 GLUCOSE BLOOD TEST: CPT

## 2022-10-29 PROCEDURE — 99285 EMERGENCY DEPT VISIT HI MDM: CPT | Mod: 25

## 2022-10-29 PROCEDURE — 87040 BLOOD CULTURE FOR BACTERIA: CPT | Mod: 59 | Performed by: EMERGENCY MEDICINE

## 2022-10-29 PROCEDURE — 83605 ASSAY OF LACTIC ACID: CPT | Mod: 91 | Performed by: EMERGENCY MEDICINE

## 2022-10-29 PROCEDURE — 82140 ASSAY OF AMMONIA: CPT | Mod: 91 | Performed by: STUDENT IN AN ORGANIZED HEALTH CARE EDUCATION/TRAINING PROGRAM

## 2022-10-29 PROCEDURE — 25000003 PHARM REV CODE 250: Performed by: INTERNAL MEDICINE

## 2022-10-29 PROCEDURE — 80074 ACUTE HEPATITIS PANEL: CPT | Performed by: INTERNAL MEDICINE

## 2022-10-29 PROCEDURE — 80143 DRUG ASSAY ACETAMINOPHEN: CPT | Performed by: INTERNAL MEDICINE

## 2022-10-29 PROCEDURE — 86592 SYPHILIS TEST NON-TREP QUAL: CPT | Performed by: STUDENT IN AN ORGANIZED HEALTH CARE EDUCATION/TRAINING PROGRAM

## 2022-10-29 PROCEDURE — 99900035 HC TECH TIME PER 15 MIN (STAT)

## 2022-10-29 PROCEDURE — 84100 ASSAY OF PHOSPHORUS: CPT | Performed by: HOSPITALIST

## 2022-10-29 PROCEDURE — 76937 US GUIDE VASCULAR ACCESS: CPT | Performed by: ANESTHESIOLOGY

## 2022-10-29 PROCEDURE — 94761 N-INVAS EAR/PLS OXIMETRY MLT: CPT

## 2022-10-29 PROCEDURE — 82803 BLOOD GASES ANY COMBINATION: CPT

## 2022-10-29 PROCEDURE — 87635 SARS-COV-2 COVID-19 AMP PRB: CPT | Performed by: EMERGENCY MEDICINE

## 2022-10-29 PROCEDURE — 82607 VITAMIN B-12: CPT | Performed by: STUDENT IN AN ORGANIZED HEALTH CARE EDUCATION/TRAINING PROGRAM

## 2022-10-29 PROCEDURE — 80100016 HC MAINTENANCE HEMODIALYSIS

## 2022-10-29 PROCEDURE — 87340 HEPATITIS B SURFACE AG IA: CPT | Performed by: HOSPITALIST

## 2022-10-29 PROCEDURE — 83690 ASSAY OF LIPASE: CPT | Performed by: INTERNAL MEDICINE

## 2022-10-29 PROCEDURE — 83605 ASSAY OF LACTIC ACID: CPT | Performed by: STUDENT IN AN ORGANIZED HEALTH CARE EDUCATION/TRAINING PROGRAM

## 2022-10-29 PROCEDURE — 84484 ASSAY OF TROPONIN QUANT: CPT | Mod: 91 | Performed by: HOSPITALIST

## 2022-10-29 PROCEDURE — 99291 PR CRITICAL CARE, E/M 30-74 MINUTES: ICD-10-PCS | Mod: ,,, | Performed by: INTERNAL MEDICINE

## 2022-10-29 PROCEDURE — 63600175 PHARM REV CODE 636 W HCPCS: Performed by: INTERNAL MEDICINE

## 2022-10-29 PROCEDURE — 87389 HIV-1 AG W/HIV-1&-2 AB AG IA: CPT | Performed by: INTERNAL MEDICINE

## 2022-10-29 PROCEDURE — 84145 PROCALCITONIN (PCT): CPT | Performed by: INTERNAL MEDICINE

## 2022-10-29 PROCEDURE — 83880 ASSAY OF NATRIURETIC PEPTIDE: CPT | Performed by: EMERGENCY MEDICINE

## 2022-10-29 PROCEDURE — 84484 ASSAY OF TROPONIN QUANT: CPT | Performed by: EMERGENCY MEDICINE

## 2022-10-29 PROCEDURE — 99291 CRITICAL CARE FIRST HOUR: CPT | Mod: ,,, | Performed by: INTERNAL MEDICINE

## 2022-10-29 PROCEDURE — 36556 CENTRAL LINE: ICD-10-PCS | Mod: 59,,, | Performed by: ANESTHESIOLOGY

## 2022-10-29 PROCEDURE — 36415 COLL VENOUS BLD VENIPUNCTURE: CPT | Performed by: HOSPITALIST

## 2022-10-29 PROCEDURE — 86706 HEP B SURFACE ANTIBODY: CPT | Performed by: HOSPITALIST

## 2022-10-29 PROCEDURE — 63600175 PHARM REV CODE 636 W HCPCS: Performed by: EMERGENCY MEDICINE

## 2022-10-29 PROCEDURE — 85025 COMPLETE CBC W/AUTO DIFF WBC: CPT | Mod: 91 | Performed by: EMERGENCY MEDICINE

## 2022-10-29 PROCEDURE — 25000003 PHARM REV CODE 250: Performed by: EMERGENCY MEDICINE

## 2022-10-29 PROCEDURE — 85025 COMPLETE CBC W/AUTO DIFF WBC: CPT | Performed by: HOSPITALIST

## 2022-10-29 RX ORDER — AMLODIPINE BESYLATE 5 MG/1
10 TABLET ORAL DAILY
Status: DISCONTINUED | OUTPATIENT
Start: 2022-10-29 | End: 2022-11-06 | Stop reason: HOSPADM

## 2022-10-29 RX ORDER — HYDRALAZINE HYDROCHLORIDE 20 MG/ML
10 INJECTION INTRAMUSCULAR; INTRAVENOUS EVERY 4 HOURS PRN
Status: DISCONTINUED | OUTPATIENT
Start: 2022-10-29 | End: 2022-10-29

## 2022-10-29 RX ORDER — DEXTROSE 50 % IN WATER (D50W) INTRAVENOUS SYRINGE
25
Status: DISCONTINUED | OUTPATIENT
Start: 2022-10-29 | End: 2022-10-29

## 2022-10-29 RX ORDER — MUPIROCIN 20 MG/G
OINTMENT TOPICAL 2 TIMES DAILY
Status: DISPENSED | OUTPATIENT
Start: 2022-10-29 | End: 2022-11-03

## 2022-10-29 RX ORDER — HYDRALAZINE HYDROCHLORIDE 25 MG/1
100 TABLET, FILM COATED ORAL EVERY 8 HOURS
Status: DISCONTINUED | OUTPATIENT
Start: 2022-10-29 | End: 2022-11-06 | Stop reason: HOSPADM

## 2022-10-29 RX ORDER — VANCOMYCIN HCL IN 5 % DEXTROSE 1G/250ML
20 PLASTIC BAG, INJECTION (ML) INTRAVENOUS ONCE
Status: COMPLETED | OUTPATIENT
Start: 2022-10-29 | End: 2022-10-29

## 2022-10-29 RX ORDER — ACETAMINOPHEN 325 MG/1
650 TABLET ORAL EVERY 6 HOURS PRN
Status: DISCONTINUED | OUTPATIENT
Start: 2022-10-29 | End: 2022-11-03

## 2022-10-29 RX ORDER — SODIUM CHLORIDE 9 MG/ML
INJECTION, SOLUTION INTRAVENOUS
Status: DISCONTINUED | OUTPATIENT
Start: 2022-10-29 | End: 2022-11-06 | Stop reason: HOSPADM

## 2022-10-29 RX ORDER — HYDRALAZINE HYDROCHLORIDE 20 MG/ML
20 INJECTION INTRAMUSCULAR; INTRAVENOUS EVERY 4 HOURS PRN
Status: DISCONTINUED | OUTPATIENT
Start: 2022-10-29 | End: 2022-11-06 | Stop reason: HOSPADM

## 2022-10-29 RX ORDER — METOPROLOL SUCCINATE 50 MG/1
100 TABLET, EXTENDED RELEASE ORAL DAILY
Status: DISCONTINUED | OUTPATIENT
Start: 2022-10-29 | End: 2022-11-06 | Stop reason: HOSPADM

## 2022-10-29 RX ORDER — SODIUM CHLORIDE 9 MG/ML
INJECTION, SOLUTION INTRAVENOUS ONCE
Status: DISCONTINUED | OUTPATIENT
Start: 2022-10-29 | End: 2022-11-06 | Stop reason: HOSPADM

## 2022-10-29 RX ORDER — LACTULOSE 10 G/15ML
10 SOLUTION ORAL 3 TIMES DAILY
Status: DISCONTINUED | OUTPATIENT
Start: 2022-10-29 | End: 2022-11-02

## 2022-10-29 RX ADMIN — PIPERACILLIN AND TAZOBACTAM 4.5 G: 4; .5 INJECTION, POWDER, LYOPHILIZED, FOR SOLUTION INTRAVENOUS; PARENTERAL at 10:10

## 2022-10-29 RX ADMIN — MUPIROCIN: 20 OINTMENT TOPICAL at 12:10

## 2022-10-29 RX ADMIN — HYDRALAZINE HYDROCHLORIDE 10 MG: 20 INJECTION INTRAMUSCULAR; INTRAVENOUS at 06:10

## 2022-10-29 RX ADMIN — SODIUM ZIRCONIUM CYCLOSILICATE 10 G: 10 POWDER, FOR SUSPENSION ORAL at 12:10

## 2022-10-29 RX ADMIN — VANCOMYCIN HYDROCHLORIDE 1000 MG: 1 INJECTION, POWDER, LYOPHILIZED, FOR SOLUTION INTRAVENOUS at 10:10

## 2022-10-29 RX ADMIN — PIPERACILLIN AND TAZOBACTAM 4.5 G: 4; .5 INJECTION, POWDER, LYOPHILIZED, FOR SOLUTION INTRAVENOUS; PARENTERAL at 09:10

## 2022-10-29 RX ADMIN — HYDRALAZINE HYDROCHLORIDE 100 MG: 25 TABLET, FILM COATED ORAL at 10:10

## 2022-10-29 RX ADMIN — APIXABAN 2.5 MG: 2.5 TABLET, FILM COATED ORAL at 08:10

## 2022-10-29 RX ADMIN — SACUBITRIL AND VALSARTAN 2 TABLET: 24; 26 TABLET, FILM COATED ORAL at 08:10

## 2022-10-29 RX ADMIN — APIXABAN 2.5 MG: 2.5 TABLET, FILM COATED ORAL at 12:10

## 2022-10-29 RX ADMIN — SODIUM BICARBONATE: 84 INJECTION, SOLUTION INTRAVENOUS at 12:10

## 2022-10-29 RX ADMIN — HYDRALAZINE HYDROCHLORIDE 20 MG: 20 INJECTION INTRAMUSCULAR; INTRAVENOUS at 07:10

## 2022-10-29 RX ADMIN — LACTULOSE 10 G: 10 SOLUTION ORAL at 08:10

## 2022-10-29 RX ADMIN — RIFAXIMIN 550 MG: 550 TABLET ORAL at 08:10

## 2022-10-29 RX ADMIN — MUPIROCIN: 20 OINTMENT TOPICAL at 08:10

## 2022-10-29 NOTE — PROGRESS NOTES
Notified Dr. Cristina of new assessment findings and was told to call Dr. Jimenez. Dr. Jimenez arrived to bedside. VSS, labs ordered and sent, ABG pending.

## 2022-10-29 NOTE — ED TRIAGE NOTES
Patient reporting generalized weakness, and bilateral leg pain. Pt. Hands are swollen bilaterally and cool to touch. Patient is lethargic but arousable by voice. Dialysis fistula on upper left arm. Pt. Denies any SOB, n/v/d. Patient does not make urine. NAD noted.  at bedside.

## 2022-10-29 NOTE — PROGRESS NOTES
Pharmacist Renal Dose Adjustment Note    Eva Bloom is a 57 y.o. female being treated with the medication zosyn    Patient Data:    Vital Signs (Most Recent):  Temp: (!) 93.6 °F (34.2 °C) (10/29/22 1105)  Pulse: 90 (10/29/22 1105)  Resp: (!) 25 (10/29/22 1105)  BP: (!) 196/86 (10/29/22 1105)  SpO2: 99 % (10/29/22 1105)   Vital Signs (72h Range):  Temp:  [93.6 °F (34.2 °C)-93.7 °F (34.3 °C)]   Pulse:  [86-90]   Resp:  [16-25]   BP: (180-196)/(81-91)   SpO2:  [99 %-100 %]      Recent Labs   Lab 10/25/22  1141 10/29/22  0828   CREATININE 3.8* 6.1*     Serum creatinine: 6.1 mg/dL (H) 10/29/22 0828  Estimated creatinine clearance: 8.3 mL/min (A)    Medication:Zosyn 4.5gm ivpb q8hr frequency will be changed to medication:Zosyn 4.5gm ivpb q12hr frequency x 3doses total as previously ordered.    Pharmacist's Name: Trice Matthew  Pharmacist's Extension: [phone]

## 2022-10-29 NOTE — Clinical Note
Diagnosis: Sepsis [931873]   Admitting Provider:: JALEESA ROLDAN [[id number]]   Future Attending Provider: JALEESA ROLDAN [[id number]]   Reason for IP Medical Treatment  (Clinical interventions that can only be accomplished in the IP setting? ) :: Dialysis   Estimated Length of Stay:: 2 midnights   I certify that Inpatient services for greater than or equal to 2 midnights are medically necessary:: Yes   Plans for Post-Acute care--if anticipated (pick the single best option):: A. No post acute care anticipated at this time

## 2022-10-29 NOTE — ASSESSMENT & PLAN NOTE
This patient does have evidence of infective focus  My overall impression is sepsis. Vital signs were reviewed and noted in progress note.  Antibiotics given-   Antibiotics (From admission, onward)    Start     Stop Route Frequency Ordered    10/29/22 0900  piperacillin-tazobactam 4.5 g in dextrose 5 % 100 mL IVPB (ready to mix system)  (ED Adult Sepsis Treatment)         10/30 0859 IV Every 8 hours (non-standard times) 10/29/22 0846    10/29/22 0900  vancomycin in dextrose 5 % 1 gram/250 mL IVPB 1,000 mg  (ED Adult Sepsis Treatment)         -- IV Once 10/29/22 0846        Cultures were taken-   Microbiology Results (last 7 days)     Procedure Component Value Units Date/Time    Blood Culture #1 **CANNOT BE ORDERED STAT** [[id number]] Collected: 10/29/22 0941    Order Status: Sent Specimen: Blood from Peripheral, Hand, Right Updated: 10/29/22 0957    Blood Culture #2 **CANNOT BE ORDERED STAT** [[id number]] Collected: 10/29/22 0915    Order Status: Sent Specimen: Blood from Peripheral, Hand, Right Updated: 10/29/22 0922        Latest lactate reviewed, they are-  Recent Labs   Lab 10/29/22  0828 10/29/22  1029   LACTATE >12.0* >12.0*       Organ dysfunction indicated by Acute liver injury  Source- possible UTI,bactremia.    Source control Achieved by- IV Abx.

## 2022-10-29 NOTE — H&P
[facility] Medicine  History & Physical    Patient Name: Eva Bloom  MRN: [id number]  Patient Class: IP- Inpatient  Admission Date: 10/29/2022  Attending Physician: José Miguel Cristina MD   Primary Care Provider: Sowmya Mccain MD         Patient information was obtained from patient and ER records.     Subjective:     Principal Problem:Sepsis    Chief Complaint:   Chief Complaint   Patient presents with    Weakness     Patient comes in with weakness bilateral leg pain, states was seen on Tuesday for same complaints, states too weak to go to dialysis this AM is lethargic in triage unable to get temp.         HPI: 57 year old female, with pertinent medical history of anemia, kidney cancer, CHF with EF of 35%,, HTN, Pafib on OAC,prediabetes, and myocardiopathy, presents to the ED to evaluate her worsening bilateral leg pain and weakness for 5 days. Patient's  states she was seen for the same symptoms 5 days ago as well as sore throat, chest pain, and nausea. She had labwork done and her results were all negative. Sore throat, chest pain, and nausea have resolved. Patient woke up this morning very weak and fatigued. She was late for dialysis and when she arrived, she was advised to come to the ED. Patient received her dialysis 2 days ago as normal. Patient was able to ambulate alone yesterday with pain, but needs support today.per ER record,  reports she has decreased appetite and is no longer producing urine. No pain medications taken today.  notes that a week ago, patient attended an event and possibly had sick contacts. Patient denies congestion, cough, trauma to legs,or other associated symptoms,patient is hypothermic in ER 93%,has elevated lactic acid,more than 12,has leucocytosis 14,patient is septic,can not gave IVF duo to  ESRD,has been  started on broad spectrum IV Abx.he has severe acidosis C O 2 of 8 and elevated Troponin 2.8,she denies chest apian dn  abdominal pain,only complain is leg pain.she has also Hyperkalemia 6.0.      Past Medical History:   Diagnosis Date    Anemia     Bronchitis 03/01/2017    Cancer 2016    kidney cancer    CHF (congestive heart failure), NYHA class II, chronic, systolic     CMV (cytomegalovirus) antibody positive     Essential hypertension 9/23/2015    H/O herpes simplex type 2 infection     Herpes simplex type 1 antibody positive     History of kidney cancer     s/p left nephrectomy 1/2016    Hyperparathyroidism, unspecified     Hyperuricemia without signs of inflammatory arthritis and tophaceous disease     Kidney stones     LGSIL (low grade squamous intraepithelial dysplasia)     Myocardiopathy 7/21/2017    Prediabetes     Proteinuria     Renal disorder     Thyroid nodule     Urate nephropathy        Past Surgical History:   Procedure Laterality Date    BREAST CYST EXCISION      COLONOSCOPY N/A 11/12/2015    COLONOSCOPY N/A 3/12/2021    Procedure: COLONOSCOPY;  Surgeon: Brendon Lanier MD;  Location: [facility];  Service: Endoscopy;  Laterality: N/A;  covid test 3/9, labs prior, prep instr mailed -ml    INSERTION OF BIVENTRICULAR IMPLANTABLE CARDIOVERTER-DEFIBRILLATOR (ICD)  04/2021    NEPHRECTOMY-LAPAROSCOPIC Left 01/12/2016    PERITONEAL CATHETER INSERTION      Permacath insertion  01/12/2017    SALPINGOOPHORECTOMY Right 2016    KJB---DAVINCI    TONSILLECTOMY      TUBAL LIGATION         Review of patient's allergies indicates:   Allergen Reactions    Coreg [carvedilol] Other (See Comments)     Nausea/vomiting    Allopurinol      Other reaction(s): abnormal transaminases       No current facility-administered medications on file prior to encounter.     Current Outpatient Medications on File Prior to Encounter   Medication Sig    acetaminophen (TYLENOL) 500 MG tablet Take 500 mg by mouth every 6 (six) hours as needed for Pain.    apixaban (ELIQUIS) 2.5 mg Tab Take 1 tablet (2.5 mg total) by mouth  2 (two) times daily.    CABOMETYX 60 mg Tab TAKE ONE TABLET BY MOUTH ONCE DAILY AT THE SAME TIME ON AN EMPTY STOMACH AT LEAST 1 HOUR BEFORE OR 2 HOURS AFTER EATING. AVOID GRAPEFRUIT PRODUCTS    cloNIDine 0.3 mg/24 hr td ptwk (CATAPRES) 0.3 mg/24 hr Place 1 patch onto the skin every 7 days.    epoetin david-epbx (RETACRIT INJ) Epoetin david - epbx (Retacrit)    hydrALAZINE (APRESOLINE) 100 MG tablet Take 1 tablet (100 mg total) by mouth every 12 (twelve) hours.    lidocaine-prilocaine (EMLA) cream APPLY ATLEAST 30 MINUTES BEFORE TREATMENT 3 TIMES A WEEK    metoprolol succinate (TOPROL-XL) 100 MG 24 hr tablet Take 1 tablet (100 mg total) by mouth once daily.    mv,Ca,min-folic acid-vit K1 (ONE-A-DAY WOMEN'S 50 PLUS) 400-20 mcg Tab Take 1 tablet by mouth.    ondansetron (ZOFRAN-ODT) 8 MG TbDL Take 1 tablet (8 mg total) by mouth every 12 (twelve) hours as needed.    oxyCODONE-acetaminophen (PERCOCET) 5-325 mg per tablet Take 1 tablet by mouth every 6 (six) hours as needed for Pain.    oxyCODONE-acetaminophen (PERCOCET) 5-325 mg per tablet Take 1 tablet by mouth every 4 (four) hours as needed.    predniSONE (DELTASONE) 20 MG tablet Take 2 tablets (40 mg total) by mouth once daily. for 4 days    promethazine (PHENERGAN) 25 MG tablet Take 1 tablet (25 mg total) by mouth every 6 (six) hours as needed for Nausea.    sacubitriL-valsartan (ENTRESTO) 49-51 mg per tablet Take 1 tablet by mouth 2 (two) times daily.    sevelamer carbonate (RENVELA) 800 mg Tab TAKE 2 TABLETS (1,600 MG TOTAL) BY MOUTH 3 (THREE) TIMES DAILY WITH MEALS. (Patient not taking: Reported on 10/26/2022)    sodium chloride 0.9% SolP 100 mL with iron sucrose 100 mg iron/5 mL Soln 100 mg 50 mg.    [DISCONTINUED] amLODIPine (NORVASC) 5 MG tablet TAKE 1 TABLET BY MOUTH EVERY DAY     Family History       Problem Relation (Age of Onset)    Diabetes Father, Maternal Grandfather    Heart disease Sister    Hypertension Mother    Kidney disease Father     No Known Problems Son, Son, Sister, Brother, Maternal Grandmother, Paternal Grandmother, Paternal Grandfather, Sister, Brother, Maternal Aunt, Maternal Uncle, Paternal Aunt, Paternal Uncle          Tobacco Use    Smoking status: Never    Smokeless tobacco: Never   Substance and Sexual Activity    Alcohol use: No     Comment: . 2 children. works at [employer].    Drug use: No    Sexual activity: Yes     Partners: Male     Review of Systems   Constitutional:  Positive for activity change and appetite change.   HENT:  Negative for congestion and dental problem.    Eyes:  Negative for pain and discharge.   Respiratory:  Negative for apnea and chest tightness.    Cardiovascular:  Negative for chest pain and leg swelling.   Gastrointestinal:  Negative for abdominal distention and abdominal pain.   Endocrine: Negative for cold intolerance and heat intolerance.   Genitourinary:  Negative for difficulty urinating and dyspareunia.   Musculoskeletal:  Positive for arthralgias and myalgias.   Skin:  Negative for color change and pallor.   Allergic/Immunologic: Negative for environmental allergies and food allergies.   Neurological:  Negative for dizziness and facial asymmetry.   Hematological:  Negative for adenopathy. Does not bruise/bleed easily.   Psychiatric/Behavioral:  Negative for agitation.    Objective:     Vital Signs (Most Recent):  Temp: (!) 93.6 °F (34.2 °C) (10/29/22 1105)  Pulse: 90 (10/29/22 1105)  Resp: (!) 25 (10/29/22 1105)  BP: (!) 196/86 (10/29/22 1105)  SpO2: 99 % (10/29/22 1105)   Vital Signs (24h Range):  Temp:  [93.6 °F (34.2 °C)-93.7 °F (34.3 °C)] 93.6 °F (34.2 °C)  Pulse:  [86-90] 90  Resp:  [16-25] 25  SpO2:  [99 %-100 %] 99 %  BP: (180-196)/(81-91) 196/86     Weight: 52.2 kg (115 lb)  Body mass index is 19.74 kg/m².    Physical Exam  HENT:      Head: Normocephalic.      Nose: Nose normal.      Mouth/Throat:      Mouth: Mucous membranes are dry.   Eyes:      Extraocular Movements:  Extraocular movements intact.      Pupils: Pupils are equal, round, and reactive to light.   Cardiovascular:      Rate and Rhythm: Normal rate and regular rhythm.      Heart sounds: No murmur heard.  Pulmonary:      Effort: No respiratory distress.      Breath sounds: No wheezing.   Abdominal:      General: Abdomen is flat. There is no distension.      Palpations: Abdomen is soft.      Tenderness: There is no abdominal tenderness.   Musculoskeletal:         General: Swelling and tenderness present.      Cervical back: Normal range of motion and neck supple.   Skin:     Coloration: Skin is not jaundiced.      Findings: No bruising.   Neurological:      General: No focal deficit present.      Mental Status: She is alert and oriented to person, place, and time.      Cranial Nerves: No cranial nerve deficit.      Motor: No weakness.   Psychiatric:         Mood and Affect: Mood normal.         Behavior: Behavior normal.         CRANIAL NERVES     CN III, IV, VI   Pupils are equal, round, and reactive to light.     Significant Labs: All pertinent labs within the past 24 hours have been reviewed.  BMP:   Recent Labs   Lab 10/29/22  0828   GLU 71      K 6.0*      CO2 8*   BUN 78*   CREATININE 6.1*   CALCIUM 10.6*     CBC:   Recent Labs   Lab 10/29/22  0828   WBC 14.59*   HGB 9.1*   HCT 29.8*        CMP:   Recent Labs   Lab 10/29/22  0828      K 6.0*      CO2 8*   GLU 71   BUN 78*   CREATININE 6.1*   CALCIUM 10.6*   PROT 6.5   ALBUMIN 3.0*   BILITOT 2.8*   ALKPHOS 249*   *   *   ANIONGAP 34*     Lactic Acid:   Recent Labs   Lab 10/29/22  0828 10/29/22  1029   LACTATE >12.0* >12.0*       Significant Imaging: I have reviewed all pertinent imaging results/findings within the past 24 hours.    Assessment/Plan:     * Sepsis  This patient does have evidence of infective focus  My overall impression is sepsis. Vital signs were reviewed and noted in progress note.  Antibiotics given-    Antibiotics (From admission, onward)    Start     Stop Route Frequency Ordered    10/29/22 0900  piperacillin-tazobactam 4.5 g in dextrose 5 % 100 mL IVPB (ready to mix system)  (ED Adult Sepsis Treatment)         10/30 0859 IV Every 8 hours (non-standard times) 10/29/22 0846    10/29/22 0900  vancomycin in dextrose 5 % 1 gram/250 mL IVPB 1,000 mg  (ED Adult Sepsis Treatment)         -- IV Once 10/29/22 0846        Cultures were taken-   Microbiology Results (last 7 days)     Procedure Component Value Units Date/Time    Blood Culture #1 **CANNOT BE ORDERED STAT** [[id number]] Collected: 10/29/22 0941    Order Status: Sent Specimen: Blood from Peripheral, Hand, Right Updated: 10/29/22 0957    Blood Culture #2 **CANNOT BE ORDERED STAT** [[id number]] Collected: 10/29/22 0915    Order Status: Sent Specimen: Blood from Peripheral, Hand, Right Updated: 10/29/22 0922        Latest lactate reviewed, they are-  Recent Labs   Lab 10/29/22  0828 10/29/22  1029   LACTATE >12.0* >12.0*       Organ dysfunction indicated by Acute liver injury  Source- possible UTI,bactremia.    Source control Achieved by- IV Abx.      Lactic acidosis  Denies abdominal  Pain,duo to sepsis.will monitor.      Transaminitis  Suspect to liver congestion,will monitor,check ammonia level.      Elevated troponin  Denies chest pain,will monitor,check Echo,consulted cardiology.      PAF (paroxysmal atrial fibrillation)  Patient with Persistent (7 days or more) atrial fibrillation which is controlled currently with Beta Blocker. Patient is currently in sinus rhythm.[id number] Score: 1. HASBLED Score: . Anticoagulation indicated. Anticoagulation done with eliquis.        ICD (implantable cardioverter-defibrillator) in place - SubQ  Will monitor.      Chronic kidney disease-mineral and bone disorder  On HD.      Anemia in ESRD (end-stage renal disease)  Will monitor.      Hyperkalemia  Started on lokelma.      CHF (congestive heart failure), NYHA class II,  chronic, systolic  With EF of 35%,fludi removal with HD.      Nonrheumatic mitral valve regurgitation  Per record,      ESRD (end stage renal disease) on dialysis  On  HD,nephrioolgy is consulted.      Pulmonary hypertension  per record.      Cardiomyopathy, nonischemic  Check echo.      S/p nephrectomy left  Per record.      History of kidney cancer  per record.      Essential hypertension  Resumed home medications..        VTE Risk Mitigation (From admission, onward)         Ordered     apixaban tablet 2.5 mg  2 times daily         10/29/22 1001              Critical care time spent on the evaluation and treatment of severe organ dysfunction, review of pertinent labs and imaging studies, discussions with consulting providers and discussions with patient/family:  Over 45  minutes.     José Miguel Cristina MD  Department of Hospital Medicine   [facility] - Intensive Care

## 2022-10-29 NOTE — HPI
HPI: 57 year old female, with pertinent medical history of anemia, kidney cancer, CHF with EF of 35%,, HTN, Pafib on OAC,prediabetes, and myocardiopathy, presents to the ED to evaluate her worsening bilateral leg pain and weakness for 5 days. Patient's  states she was seen for the same symptoms 5 days ago as well as sore throat, chest pain, and nausea. She had labwork done and her results were all negative. Sore throat, chest pain, and nausea have resolved. Patient woke up this morning very weak and fatigued. She was late for dialysis and when she arrived, she was advised to come to the ED. Patient received her dialysis 2 days ago as normal. Patient was able to ambulate alone yesterday with pain, but needs support today.per ER record,  reports she has decreased appetite and is no longer producing urine. No pain medications taken today.  notes that a week ago, patient attended an event and possibly had sick contacts. Patient denies congestion, cough, trauma to legs,or other associated symptoms,patient is hypothermic in ER 93%,has elevated lactic acid,more than 12,has leucocytosis 14,patient is septic,can not gave IVF duo to  ESRD,has been  started on broad spectrum IV Abx.he has severe acidosis C O 2 of 8 and elevated Troponin 2.8,she denies chest apian dn abdominal pain,only complain is leg pain.she has also Hyperkalemia 6.0.     Poor historian. Feels weak. Hurts all over  EKG Sinus tachycardia LVH no acute STT changes  Troponin 2.9  Lactate > 12  CO2 8  K 6  BNP > 4900    Echo 6/29/22  The left ventricle is normal in size with mild concentric hypertrophy and moderately decreased systolic function.  Moderate posterior pericardial effusion.  Moderate right atrial enlargement.  Mild tricuspid regurgitation.  Severe left atrial enlargement.  Mild pulmonic regurgitation.  The estimated ejection fraction is 35%.  Grade II left ventricular diastolic dysfunction.  Moderate right ventricular enlargement  with mildly reduced right ventricular systolic function.  Mild mitral regurgitation.  Normal central venous pressure (3 mmHg).  The estimated PA systolic pressure is 47 mmHg.  There is pulmonary hypertension.  Mild aortic regurgitation.    [place] [facility]    1. PAF (paroxysmal atrial fibrillation)  Prefers oac  therapy. INR already <2.  Stop warfarin and start apixaban 2.5 mg BID.  Refer to pharmacy assistance.      - apixaban (ELIQUIS) 2.5 mg Tab; Take 1 tablet (2.5 mg total) by mouth 2 (two) times daily.  Dispense: 60 tablet; Refill: 11  - Ambulatory referral/consult to Pharmacy Assistance; Future     2. Chronic anticoagulation  Having nose bleeding on warfarin.  Switch to oac therapy.  Discussed when to seek immediate medical attention.         3. Cardiomyopathy, nonischemic  Well compensated. NYHA class II.  Not on transplant list d/t low EF.  Change ACEI to ARNI.  Instructed to stop lisinopril.  Start entresto 49-51 mg on Friday evening BID.  Continue metoprolol succinate 100 mg daily. At next visit, consider increasing her metoprolol to 150 mg or increasing her entresto to  mg BID pending BP and HD tolerance. Eventually would like her on bidil or the components (hydralazine/isordil).  Plan to decrease clonidine patch for bp room for GDMT for her CM/HF.       - sacubitriL-valsartan (ENTRESTO) 49-51 mg per tablet; Take 1 tablet by mouth 2 (two) times daily.  Dispense: 60 tablet; Refill: 11     4. Combined systolic and diastolic cardiac dysfunction  See #3      - sacubitriL-valsartan (ENTRESTO) 49-51 mg per tablet; Take 1 tablet by mouth 2 (two) times daily.  Dispense: 60 tablet; Refill: 11  - Ambulatory referral/consult to Pharmacy Assistance; Future     4. ICD (implantable cardioverter-defibrillator) in place - SubQ  Denies discharge from device.  Has some irritation of the device d/t location under her arm/side of her left breast.  Her bra rubs on the device and she has very little subq fat.  Discussed  going braless or wearing stick on estrella so prevent having the device restricted by her bra. Site is not warm or red.  No obvious infection.  Had Dr. Campbell  look at site.  Will have her f/u with Dr. Chisholm.      7. Nonrheumatic mitral valve regurgitation        8. Essential hypertension  BP not at goal <130/80. See CM problem for changes.         9. Pulmonary hypertension        10. ESRD (end stage renal disease) on dialysis        11. Anemia in ESRD (end-stage renal disease)  On procrit. Followed by nephrology     12. Hyperparathyroidism, secondary renal  Defer to nephrology     13. Thrombocytopenia  PLT normalized.  Resolved.

## 2022-10-29 NOTE — CONSULTS
[facility] - Intensive Care  Cardiology  Consult Note    Patient Name: Eva Bloom  MRN: [id number]  Admission Date: 10/29/2022  Hospital Length of Stay: 0 days  Code Status: Prior   Attending Provider: José Miguel Cristina MD   Consulting Provider: Shayne Walker MD  Primary Care Physician: Sowmya Mccain MD  Principal Problem:Sepsis    Patient information was obtained from patient and ER records.     Consults  Subjective:     Chief Complaint:  Elevated troponin     HPI: 57 year old female, with pertinent medical history of anemia, kidney cancer, CHF with EF of 35%,, HTN, Pafib on OAC,prediabetes, and myocardiopathy, presents to the ED to evaluate her worsening bilateral leg pain and weakness for 5 days. Patient's  states she was seen for the same symptoms 5 days ago as well as sore throat, chest pain, and nausea. She had labwork done and her results were all negative. Sore throat, chest pain, and nausea have resolved. Patient woke up this morning very weak and fatigued. She was late for dialysis and when she arrived, she was advised to come to the ED. Patient received her dialysis 2 days ago as normal. Patient was able to ambulate alone yesterday with pain, but needs support today.per ER record,  reports she has decreased appetite and is no longer producing urine. No pain medications taken today.  notes that a week ago, patient attended an event and possibly had sick contacts. Patient denies congestion, cough, trauma to legs,or other associated symptoms,patient is hypothermic in ER 93%,has elevated lactic acid,more than 12,has leucocytosis 14,patient is septic,can not gave IVF duo to  ESRD,has been  started on broad spectrum IV Abx.he has severe acidosis C O 2 of 8 and elevated Troponin 2.8,she denies chest apian dn abdominal pain,only complain is leg pain.she has also Hyperkalemia 6.0.     Poor historian. Feels weak. Hurts all over  EKG Sinus tachycardia LVH no acute STT  changes  Troponin 2.9  Lactate > 12  CO2 8  K 6  BNP > 4900    Echo 6/29/22   The left ventricle is normal in size with mild concentric hypertrophy and moderately decreased systolic function.   Moderate posterior pericardial effusion.   Moderate right atrial enlargement.   Mild tricuspid regurgitation.   Severe left atrial enlargement.   Mild pulmonic regurgitation.   The estimated ejection fraction is 35%.   Grade II left ventricular diastolic dysfunction.   Moderate right ventricular enlargement with mildly reduced right ventricular systolic function.   Mild mitral regurgitation.   Normal central venous pressure (3 mmHg).   The estimated PA systolic pressure is 47 mmHg.   There is pulmonary hypertension.   Mild aortic regurgitation.    Followed at [facility]    1. PAF (paroxysmal atrial fibrillation)  Prefers oac  therapy. INR already <2.  Stop warfarin and start apixaban 2.5 mg BID.  Refer to pharmacy assistance.      - apixaban (ELIQUIS) 2.5 mg Tab; Take 1 tablet (2.5 mg total) by mouth 2 (two) times daily.  Dispense: 60 tablet; Refill: 11  - Ambulatory referral/consult to Pharmacy Assistance; Future     2. Chronic anticoagulation  Having nose bleeding on warfarin.  Switch to oac therapy.  Discussed when to seek immediate medical attention.         3. Cardiomyopathy, nonischemic  Well compensated. NYHA class II.  Not on transplant list d/t low EF.  Change ACEI to ARNI.  Instructed to stop lisinopril.  Start entresto 49-51 mg on Friday evening BID.  Continue metoprolol succinate 100 mg daily. At next visit, consider increasing her metoprolol to 150 mg or increasing her entresto to  mg BID pending BP and HD tolerance. Eventually would like her on bidil or the components (hydralazine/isordil).  Plan to decrease clonidine patch for bp room for GDMT for her CM/HF.       - sacubitriL-valsartan (ENTRESTO) 49-51 mg per tablet; Take 1 tablet by mouth 2 (two) times daily.  Dispense: 60 tablet; Refill: 11      4. Combined systolic and diastolic cardiac dysfunction  See #3      - sacubitriL-valsartan (ENTRESTO) 49-51 mg per tablet; Take 1 tablet by mouth 2 (two) times daily.  Dispense: 60 tablet; Refill: 11  - Ambulatory referral/consult to Pharmacy Assistance; Future     4. ICD (implantable cardioverter-defibrillator) in place - SubQ  Denies discharge from device.  Has some irritation of the device d/t location under her arm/side of her left breast.  Her bra rubs on the device and she has very little subq fat.  Discussed going braless or wearing stick on estrella so prevent having the device restricted by her bra. Site is not warm or red.  No obvious infection.  Had Dr. Campbell  look at site.  Will have her f/u with Dr. Chisholm.      7. Nonrheumatic mitral valve regurgitation        8. Essential hypertension  BP not at goal <130/80. See CM problem for changes.         9. Pulmonary hypertension        10. ESRD (end stage renal disease) on dialysis        11. Anemia in ESRD (end-stage renal disease)  On procrit. Followed by nephrology     12. Hyperparathyroidism, secondary renal  Defer to nephrology     13. Thrombocytopenia  PLT normalized.  Resolved.            Past Medical History:   Diagnosis Date    Anemia     Bronchitis 03/01/2017    Cancer 2016    kidney cancer    CHF (congestive heart failure), NYHA class II, chronic, systolic     CMV (cytomegalovirus) antibody positive     Essential hypertension 9/23/2015    H/O herpes simplex type 2 infection     Herpes simplex type 1 antibody positive     History of kidney cancer     s/p left nephrectomy 1/2016    Hyperparathyroidism, unspecified     Hyperuricemia without signs of inflammatory arthritis and tophaceous disease     Kidney stones     LGSIL (low grade squamous intraepithelial dysplasia)     Myocardiopathy 7/21/2017    Prediabetes     Proteinuria     Renal disorder     Thyroid nodule     Urate nephropathy        Past Surgical History:   Procedure  Laterality Date    BREAST CYST EXCISION      COLONOSCOPY N/A 11/12/2015    COLONOSCOPY N/A 3/12/2021    Procedure: COLONOSCOPY;  Surgeon: Brendon Lanier MD;  Location: [facility];  Service: Endoscopy;  Laterality: N/A;  covid test 3/9, labs prior, prep instr mailed -ml    INSERTION OF BIVENTRICULAR IMPLANTABLE CARDIOVERTER-DEFIBRILLATOR (ICD)  04/2021    NEPHRECTOMY-LAPAROSCOPIC Left 01/12/2016    PERITONEAL CATHETER INSERTION      Permacath insertion  01/12/2017    SALPINGOOPHORECTOMY Right 2016    KJB---DAVINCI    TONSILLECTOMY      TUBAL LIGATION         Review of patient's allergies indicates:   Allergen Reactions    Coreg [carvedilol] Other (See Comments)     Nausea/vomiting    Allopurinol      Other reaction(s): abnormal transaminases       No current facility-administered medications on file prior to encounter.     Current Outpatient Medications on File Prior to Encounter   Medication Sig    acetaminophen (TYLENOL) 500 MG tablet Take 500 mg by mouth every 6 (six) hours as needed for Pain.    apixaban (ELIQUIS) 2.5 mg Tab Take 1 tablet (2.5 mg total) by mouth 2 (two) times daily.    CABOMETYX 60 mg Tab TAKE ONE TABLET BY MOUTH ONCE DAILY AT THE SAME TIME ON AN EMPTY STOMACH AT LEAST 1 HOUR BEFORE OR 2 HOURS AFTER EATING. AVOID GRAPEFRUIT PRODUCTS    cloNIDine 0.3 mg/24 hr td ptwk (CATAPRES) 0.3 mg/24 hr Place 1 patch onto the skin every 7 days.    epoetin david-epbx (RETACRIT INJ) Epoetin david - epbx (Retacrit)    hydrALAZINE (APRESOLINE) 100 MG tablet Take 1 tablet (100 mg total) by mouth every 12 (twelve) hours.    lidocaine-prilocaine (EMLA) cream APPLY ATLEAST 30 MINUTES BEFORE TREATMENT 3 TIMES A WEEK    metoprolol succinate (TOPROL-XL) 100 MG 24 hr tablet Take 1 tablet (100 mg total) by mouth once daily.    mv,Ca,min-folic acid-vit K1 (ONE-A-DAY WOMEN'S 50 PLUS) 400-20 mcg Tab Take 1 tablet by mouth.    ondansetron (ZOFRAN-ODT) 8 MG TbDL Take 1 tablet (8 mg total) by mouth every  12 (twelve) hours as needed.    oxyCODONE-acetaminophen (PERCOCET) 5-325 mg per tablet Take 1 tablet by mouth every 6 (six) hours as needed for Pain.    oxyCODONE-acetaminophen (PERCOCET) 5-325 mg per tablet Take 1 tablet by mouth every 4 (four) hours as needed.    predniSONE (DELTASONE) 20 MG tablet Take 2 tablets (40 mg total) by mouth once daily. for 4 days    promethazine (PHENERGAN) 25 MG tablet Take 1 tablet (25 mg total) by mouth every 6 (six) hours as needed for Nausea.    sacubitriL-valsartan (ENTRESTO) 49-51 mg per tablet Take 1 tablet by mouth 2 (two) times daily.    sevelamer carbonate (RENVELA) 800 mg Tab TAKE 2 TABLETS (1,600 MG TOTAL) BY MOUTH 3 (THREE) TIMES DAILY WITH MEALS. (Patient not taking: Reported on 10/26/2022)    sodium chloride 0.9% SolP 100 mL with iron sucrose 100 mg iron/5 mL Soln 100 mg 50 mg.    [DISCONTINUED] amLODIPine (NORVASC) 5 MG tablet TAKE 1 TABLET BY MOUTH EVERY DAY     Family History       Problem Relation (Age of Onset)    Diabetes Father, Maternal Grandfather    Heart disease Sister    Hypertension Mother    Kidney disease Father    No Known Problems Son, Son, Sister, Brother, Maternal Grandmother, Paternal Grandmother, Paternal Grandfather, Sister, Brother, Maternal Aunt, Maternal Uncle, Paternal Aunt, Paternal Uncle          Tobacco Use    Smoking status: Never    Smokeless tobacco: Never   Substance and Sexual Activity    Alcohol use: No     Comment: . 2 children. works at [employer].    Drug use: No    Sexual activity: Yes     Partners: Male     Review of Systems   Unable to perform ROS: Acuity of condition   Objective:     Vital Signs (Most Recent):  Temp: (!) 93.6 °F (34.2 °C) (10/29/22 1105)  Pulse: 90 (10/29/22 1105)  Resp: (!) 25 (10/29/22 1105)  BP: (!) 196/86 (10/29/22 1105)  SpO2: 99 % (10/29/22 1105)   Vital Signs (24h Range):  Temp:  [93.6 °F (34.2 °C)-93.7 °F (34.3 °C)] 93.6 °F (34.2 °C)  Pulse:  [86-90] 90  Resp:  [16-25] 25  SpO2:  [99  %-100 %] 99 %  BP: (180-196)/(81-91) 196/86     Weight: 52.2 kg (115 lb)  Body mass index is 19.74 kg/m².    SpO2: 99 %  O2 Device (Oxygen Therapy): room air      Intake/Output Summary (Last 24 hours) at 10/29/2022 1137  Last data filed at 10/29/2022 1105  Gross per 24 hour   Intake 225 ml   Output --   Net 225 ml       Lines/Drains/Airways       Central Venous Catheter Line  Duration                  Hemodialysis AV Graft Left upper arm -- days         Hemodialysis Catheter 01/12/17 0627 2116 days              Drain  Duration                  Hemodialysis AV Fistula Left upper arm -- days         Hemodialysis AV Fistula 01/12/17 0629 Left upper arm 2116 days              Peripheral Intravenous Line  Duration                  Peripheral IV - Single Lumen 10/29/22 0824 22 G Anterior;Right Hand <1 day         Peripheral IV - Single Lumen 10/29/22 0836 20 G Anterior;Distal;Right Upper Arm <1 day                    Physical Exam  Constitutional:       Appearance: She is well-developed.   HENT:      Head: Normocephalic and atraumatic.   Eyes:      Conjunctiva/sclera: Conjunctivae normal.      Pupils: Pupils are equal, round, and reactive to light.   Cardiovascular:      Rate and Rhythm: Normal rate.      Pulses: Intact distal pulses.      Heart sounds: Normal heart sounds.   Pulmonary:      Effort: Pulmonary effort is normal.      Breath sounds: Normal breath sounds.   Abdominal:      General: Bowel sounds are normal.      Palpations: Abdomen is soft.   Musculoskeletal:         General: Normal range of motion.      Cervical back: Normal range of motion and neck supple.   Skin:     General: Skin is warm and dry.   Neurological:      Mental Status: She is alert and oriented to person, place, and time.       Significant Labs: All pertinent lab results from the last 24 hours have been reviewed.    Significant Imaging: Echocardiogram: Transthoracic echo (TTE) complete (Cupid Only):   Results for orders placed or performed  during the hospital encounter of 06/28/22   Echo   Result Value Ref Range    BSA 1.58 m2    TDI SEPTAL 0.04 m/s    LV LATERAL E/E' RATIO 20.17 m/s    LV SEPTAL E/E' RATIO 30.25 m/s    LA WIDTH 4.65 cm    TDI LATERAL 0.06 m/s    PV PEAK VELOCITY 1.28 cm/s    LVIDd 5.53 3.5 - 6.0 cm    IVS 1.35 (A) 0.6 - 1.1 cm    Posterior Wall 1.19 (A) 0.6 - 1.1 cm    LVIDs 4.61 (A) 2.1 - 4.0 cm    FS 17 28 - 44 %    LA volume 103.18 cm3    Sinus 3.47 cm    STJ 2.47 cm    Ascending aorta 3.10 cm    LV mass 297.16 g    LA size 4.03 cm    RVDD 4.99 cm    TAPSE 2.34 cm    RV S' 14.05 cm/s    Left Ventricle Relative Wall Thickness 0.43 cm    AV mean gradient 8 mmHg    AV valve area 1.95 cm2    AV Velocity Ratio 0.56     AV index (prosthetic) 0.63     E/A ratio 0.97     Mean e' 0.05 m/s    E wave deceleration time 190.48 msec    IVRT 80.74 msec    Pulm vein S/D ratio 1.15     LVOT diameter 1.99 cm    LVOT area 3.1 cm2    LVOT peak subhash 1.12 m/s    LVOT peak VTI 22.27 cm    Ao peak subhash 2.00 m/s    Ao VTI 35.44 cm    Mr max subhash 0.05 m/s    LVOT stroke volume 69.23 cm3    AV peak gradient 16 mmHg    E/E' ratio 24.20 m/s    MV Peak E Subhash 1.21 m/s    TR Max Subhash 3.31 m/s    MV Peak A Subhash 1.25 m/s    PV Peak S Subhash 0.46 m/s    PV Peak D Subhash 0.40 m/s    LV Systolic Volume 97.65 mL    LV Systolic Volume Index 61.4 mL/m2    LV Diastolic Volume 149.56 mL    LV Diastolic Volume Index 94.06 mL/m2    LA Volume Index 64.9 mL/m2    LV Mass Index 187 g/m2    RA Major Axis 5.68 cm    Left Atrium Minor Axis 6.35 cm    Left Atrium Major Axis 6.61 cm    Triscuspid Valve Regurgitation Peak Gradient 44 mmHg    RA Width 4.86 cm    Right Atrial Pressure (from IVC) 3 mmHg    EF 35 %    TV rest pulmonary artery pressure 47 mmHg    Narrative    · The left ventricle is normal in size with mild concentric hypertrophy   and moderately decreased systolic function.  · Moderate posterior pericardial effusion.  · Moderate right atrial enlargement.  · Mild tricuspid  regurgitation.  · Severe left atrial enlargement.  · Mild pulmonic regurgitation.  · The estimated ejection fraction is 35%.  · Grade II left ventricular diastolic dysfunction.  · Moderate right ventricular enlargement with mildly reduced right   ventricular systolic function.  · Mild mitral regurgitation.  · Normal central venous pressure (3 mmHg).  · The estimated PA systolic pressure is 47 mmHg.  · There is pulmonary hypertension.  · Mild aortic regurgitation.        Assessment and Plan:     * Sepsis  Per primary      Elevated troponin  Troponin 2.9. No acute EKG changes. Previous NICM - baseline EF 35% Suspect this is demand ischemia from sepsis, acidosis, HTN. Repeat echo - if stable will f/u prn    Lactic acidosis  From sepsis. Lactate > 12    PAF (paroxysmal atrial fibrillation)  Currently NSR. On eliquis    ICD (implantable cardioverter-defibrillator) in place - SubQ  [place] [facility]. No report of issues    Cardiomyopathy, nonischemic  Followed at [facility]. Repeat echo    Essential hypertension  Poorly controlled. Consider cardene drip if BP does not improve        VTE Risk Mitigation (From admission, onward)         Ordered     apixaban tablet 2.5 mg  2 times daily         10/29/22 1001              35 minutes spent with patient in ICU    Thank you for your consult. I will follow-up with patient. Please contact us if you have any additional questions.    Shayne Walker MD  Cardiology   [facility] - Intensive Care

## 2022-10-29 NOTE — ASSESSMENT & PLAN NOTE
Patient with Persistent (7 days or more) atrial fibrillation which is controlled currently with Beta Blocker. Patient is currently in sinus rhythm.[id number] Score: 1. HASBLED Score: . Anticoagulation indicated. Anticoagulation done with eliquis.

## 2022-10-29 NOTE — SUBJECTIVE & OBJECTIVE
Past Medical History:   Diagnosis Date    Anemia     Bronchitis 03/01/2017    Cancer 2016    kidney cancer    CHF (congestive heart failure), NYHA class II, chronic, systolic     CMV (cytomegalovirus) antibody positive     Essential hypertension 9/23/2015    H/O herpes simplex type 2 infection     Herpes simplex type 1 antibody positive     History of kidney cancer     s/p left nephrectomy 1/2016    Hyperparathyroidism, unspecified     Hyperuricemia without signs of inflammatory arthritis and tophaceous disease     Kidney stones     LGSIL (low grade squamous intraepithelial dysplasia)     Myocardiopathy 7/21/2017    Prediabetes     Proteinuria     Renal disorder     Thyroid nodule     Urate nephropathy        Past Surgical History:   Procedure Laterality Date    BREAST CYST EXCISION      COLONOSCOPY N/A 11/12/2015    COLONOSCOPY N/A 3/12/2021    Procedure: COLONOSCOPY;  Surgeon: Brendon Lanier MD;  Location: [facility];  Service: Endoscopy;  Laterality: N/A;  covid test 3/9, labs prior, prep instr mailed -ml    INSERTION OF BIVENTRICULAR IMPLANTABLE CARDIOVERTER-DEFIBRILLATOR (ICD)  04/2021    NEPHRECTOMY-LAPAROSCOPIC Left 01/12/2016    PERITONEAL CATHETER INSERTION      Permacath insertion  01/12/2017    SALPINGOOPHORECTOMY Right 2016    KJB---DAVINCI    TONSILLECTOMY      TUBAL LIGATION         Review of patient's allergies indicates:   Allergen Reactions    Coreg [carvedilol] Other (See Comments)     Nausea/vomiting    Allopurinol      Other reaction(s): abnormal transaminases       No current facility-administered medications on file prior to encounter.     Current Outpatient Medications on File Prior to Encounter   Medication Sig    acetaminophen (TYLENOL) 500 MG tablet Take 500 mg by mouth every 6 (six) hours as needed for Pain.    apixaban (ELIQUIS) 2.5 mg Tab Take 1 tablet (2.5 mg total) by mouth 2 (two) times daily.    CABOMETYX 60 mg Tab TAKE ONE TABLET BY MOUTH ONCE DAILY AT THE SAME TIME ON AN EMPTY  STOMACH AT LEAST 1 HOUR BEFORE OR 2 HOURS AFTER EATING. AVOID GRAPEFRUIT PRODUCTS    cloNIDine 0.3 mg/24 hr td ptwk (CATAPRES) 0.3 mg/24 hr Place 1 patch onto the skin every 7 days.    epoetin david-epbx (RETACRIT INJ) Epoetin david - epbx (Retacrit)    hydrALAZINE (APRESOLINE) 100 MG tablet Take 1 tablet (100 mg total) by mouth every 12 (twelve) hours.    lidocaine-prilocaine (EMLA) cream APPLY ATLEAST 30 MINUTES BEFORE TREATMENT 3 TIMES A WEEK    metoprolol succinate (TOPROL-XL) 100 MG 24 hr tablet Take 1 tablet (100 mg total) by mouth once daily.    mv,Ca,min-folic acid-vit K1 (ONE-A-DAY WOMEN'S 50 PLUS) 400-20 mcg Tab Take 1 tablet by mouth.    ondansetron (ZOFRAN-ODT) 8 MG TbDL Take 1 tablet (8 mg total) by mouth every 12 (twelve) hours as needed.    oxyCODONE-acetaminophen (PERCOCET) 5-325 mg per tablet Take 1 tablet by mouth every 6 (six) hours as needed for Pain.    oxyCODONE-acetaminophen (PERCOCET) 5-325 mg per tablet Take 1 tablet by mouth every 4 (four) hours as needed.    predniSONE (DELTASONE) 20 MG tablet Take 2 tablets (40 mg total) by mouth once daily. for 4 days    promethazine (PHENERGAN) 25 MG tablet Take 1 tablet (25 mg total) by mouth every 6 (six) hours as needed for Nausea.    sacubitriL-valsartan (ENTRESTO) 49-51 mg per tablet Take 1 tablet by mouth 2 (two) times daily.    sevelamer carbonate (RENVELA) 800 mg Tab TAKE 2 TABLETS (1,600 MG TOTAL) BY MOUTH 3 (THREE) TIMES DAILY WITH MEALS. (Patient not taking: Reported on 10/26/2022)    sodium chloride 0.9% SolP 100 mL with iron sucrose 100 mg iron/5 mL Soln 100 mg 50 mg.    [DISCONTINUED] amLODIPine (NORVASC) 5 MG tablet TAKE 1 TABLET BY MOUTH EVERY DAY     Family History       Problem Relation (Age of Onset)    Diabetes Father, Maternal Grandfather    Heart disease Sister    Hypertension Mother    Kidney disease Father    No Known Problems Son, Son, Sister, Brother, Maternal Grandmother, Paternal Grandmother, Paternal Grandfather, Sister,  Brother, Maternal Aunt, Maternal Uncle, Paternal Aunt, Paternal Uncle          Tobacco Use    Smoking status: Never    Smokeless tobacco: Never   Substance and Sexual Activity    Alcohol use: No     Comment: . 2 children. works at [employer].    Drug use: No    Sexual activity: Yes     Partners: Male     Review of Systems   Constitutional:  Positive for activity change and appetite change.   HENT:  Negative for congestion and dental problem.    Eyes:  Negative for pain and discharge.   Respiratory:  Negative for apnea and chest tightness.    Cardiovascular:  Negative for chest pain and leg swelling.   Gastrointestinal:  Negative for abdominal distention and abdominal pain.   Endocrine: Negative for cold intolerance and heat intolerance.   Genitourinary:  Negative for difficulty urinating and dyspareunia.   Musculoskeletal:  Positive for arthralgias and myalgias.   Skin:  Negative for color change and pallor.   Allergic/Immunologic: Negative for environmental allergies and food allergies.   Neurological:  Negative for dizziness and facial asymmetry.   Hematological:  Negative for adenopathy. Does not bruise/bleed easily.   Psychiatric/Behavioral:  Negative for agitation.    Objective:     Vital Signs (Most Recent):  Temp: (!) 93.6 °F (34.2 °C) (10/29/22 1105)  Pulse: 90 (10/29/22 1105)  Resp: (!) 25 (10/29/22 1105)  BP: (!) 196/86 (10/29/22 1105)  SpO2: 99 % (10/29/22 1105)   Vital Signs (24h Range):  Temp:  [93.6 °F (34.2 °C)-93.7 °F (34.3 °C)] 93.6 °F (34.2 °C)  Pulse:  [86-90] 90  Resp:  [16-25] 25  SpO2:  [99 %-100 %] 99 %  BP: (180-196)/(81-91) 196/86     Weight: 52.2 kg (115 lb)  Body mass index is 19.74 kg/m².    Physical Exam  HENT:      Head: Normocephalic.      Nose: Nose normal.      Mouth/Throat:      Mouth: Mucous membranes are dry.   Eyes:      Extraocular Movements: Extraocular movements intact.      Pupils: Pupils are equal, round, and reactive to light.   Cardiovascular:      Rate and Rhythm:  Normal rate and regular rhythm.      Heart sounds: No murmur heard.  Pulmonary:      Effort: No respiratory distress.      Breath sounds: No wheezing.   Abdominal:      General: Abdomen is flat. There is no distension.      Palpations: Abdomen is soft.      Tenderness: There is no abdominal tenderness.   Musculoskeletal:         General: Swelling and tenderness present.      Cervical back: Normal range of motion and neck supple.   Skin:     Coloration: Skin is not jaundiced.      Findings: No bruising.   Neurological:      General: No focal deficit present.      Mental Status: She is alert and oriented to person, place, and time.      Cranial Nerves: No cranial nerve deficit.      Motor: No weakness.   Psychiatric:         Mood and Affect: Mood normal.         Behavior: Behavior normal.         CRANIAL NERVES     CN III, IV, VI   Pupils are equal, round, and reactive to light.     Significant Labs: All pertinent labs within the past 24 hours have been reviewed.  BMP:   Recent Labs   Lab 10/29/22  0828   GLU 71      K 6.0*      CO2 8*   BUN 78*   CREATININE 6.1*   CALCIUM 10.6*     CBC:   Recent Labs   Lab 10/29/22  0828   WBC 14.59*   HGB 9.1*   HCT 29.8*        CMP:   Recent Labs   Lab 10/29/22  0828      K 6.0*      CO2 8*   GLU 71   BUN 78*   CREATININE 6.1*   CALCIUM 10.6*   PROT 6.5   ALBUMIN 3.0*   BILITOT 2.8*   ALKPHOS 249*   *   *   ANIONGAP 34*     Lactic Acid:   Recent Labs   Lab 10/29/22  0828 10/29/22  1029   LACTATE >12.0* >12.0*       Significant Imaging: I have reviewed all pertinent imaging results/findings within the past 24 hours.

## 2022-10-29 NOTE — CLINICAL REVIEW
IP Sepsis Screen (most recent)       Sepsis Screen (IP) - 10/29/22 1203       Is the patient's history or complaint suggestive of a possible infection? Yes  -DD    Are there at least two of the following signs and symptoms present? Yes  -DD    Sepsis signs/symptoms - Hyper or Hypothermia Hyperthermia >100.4 or Hypothermia < 96.8  -DD    Sepsis signs/symptoms - WBC WBC < 4,000 or WBC > 12,000  -DD    Sepsis signs/symptoms - Altered Mental Status Altered Mental Status  -DD    Are any of the following organ dysfunction criteria present and not considered to be due to a chronic condition? Yes  -DD    Organ Dysfunction Criteria Creatinine > 2.0  -DD    Organ Dysfunction Criteria Total Bilirubin > 2.0 Platelet count < 100,000  -DD    Organ Dysfunction Criteria Lactate > 2.0  -DD    Initiate Sepsis Protocol No  -DD    Reason sepsis not considered Pt. receiving appropriate management  -DD              User Key  (r) = Recorded By, (t) = Taken By, (c) = Cosigned By      Initials Name    PÉREZ Davey RN

## 2022-10-29 NOTE — HPI
57 year old female, with pertinent medical history of anemia, kidney cancer, CHF with EF of 35%,, HTN, Pafib on OAC,prediabetes, and myocardiopathy, presents to the ED to evaluate her worsening bilateral leg pain and weakness for 5 days. Patient's  states she was seen for the same symptoms 5 days ago as well as sore throat, chest pain, and nausea. She had labwork done and her results were all negative. Sore throat, chest pain, and nausea have resolved. Patient woke up this morning very weak and fatigued. She was late for dialysis and when she arrived, she was advised to come to the ED. Patient received her dialysis 2 days ago as normal. Patient was able to ambulate alone yesterday with pain, but needs support today.per ER record,  reports she has decreased appetite and is no longer producing urine. No pain medications taken today.  notes that a week ago, patient attended an event and possibly had sick contacts. Patient denies congestion, cough, trauma to legs,or other associated symptoms,patient is hypothermic in ER 93%,has elevated lactic acid,more than 12,has leucocytosis 14,patient is septic,can not gave IVF duo to  ESRD,has been  started on broad spectrum IV Abx.he has severe acidosis C O 2 of 8 and elevated Troponin 2.8,she denies chest apian dn abdominal pain,only complain is leg pain.she has also Hyperkalemia 6.0.

## 2022-10-29 NOTE — CONSULTS
Nephrology Consult Note    Date of Admit: 10/29/2022    Chief Complaint/Reason for Consult     Weakness (Patient comes in with weakness bilateral leg pain, states was seen on Tuesday for same complaints, states too weak to go to dialysis this AM is lethargic in triage unable to get temp. )    Nephrology team consulted for ESRD/ HD   Subjective:      History of Present Illness:  Eva Bloom is a 57 y.o.  who  has a past medical history of Anemia, Bronchitis (03/01/2017), Cancer (2016), CHF (congestive heart failure), NYHA class II, chronic, systolic, CMV (cytomegalovirus) antibody positive, Essential hypertension (9/23/2015), H/O herpes simplex type 2 infection, Herpes simplex type 1 antibody positive, History of kidney cancer, Hyperparathyroidism, unspecified, Hyperuricemia without signs of inflammatory arthritis and tophaceous disease, Kidney stones, LGSIL (low grade squamous intraepithelial dysplasia), Myocardiopathy (7/21/2017), Prediabetes, Proteinuria, Renal disorder, Thyroid nodule, and Urate nephropathy.. The patient presented to [facility] on 10/29/2022 with a primary complaint of Weakness (Patient comes in with weakness bilateral leg pain, states was seen on Tuesday for same complaints, states too weak to go to dialysis this AM is lethargic in triage unable to get temp. )      Past Medical History:  Past Medical History:   Diagnosis Date    Anemia     Bronchitis 03/01/2017    Cancer 2016    kidney cancer    CHF (congestive heart failure), NYHA class II, chronic, systolic     CMV (cytomegalovirus) antibody positive     Essential hypertension 9/23/2015    H/O herpes simplex type 2 infection     Herpes simplex type 1 antibody positive     History of kidney cancer     s/p left nephrectomy 1/2016    Hyperparathyroidism, unspecified     Hyperuricemia without signs of inflammatory arthritis and tophaceous disease     Kidney stones     LGSIL (low grade squamous intraepithelial dysplasia)      Myocardiopathy 7/21/2017    Prediabetes     Proteinuria     Renal disorder     Thyroid nodule     Urate nephropathy        Past Surgical History:  Past Surgical History:   Procedure Laterality Date    BREAST CYST EXCISION      COLONOSCOPY N/A 11/12/2015    COLONOSCOPY N/A 3/12/2021    Procedure: COLONOSCOPY;  Surgeon: Brendon Lanier MD;  Location: [facility];  Service: Endoscopy;  Laterality: N/A;  covid test 3/9, labs prior, prep instr mailed -ml    INSERTION OF BIVENTRICULAR IMPLANTABLE CARDIOVERTER-DEFIBRILLATOR (ICD)  04/2021    NEPHRECTOMY-LAPAROSCOPIC Left 01/12/2016    PERITONEAL CATHETER INSERTION      Permacath insertion  01/12/2017    SALPINGOOPHORECTOMY Right 2016    KJB---DAVINCI    TONSILLECTOMY      TUBAL LIGATION         Allergies:  Review of patient's allergies indicates:   Allergen Reactions    Coreg [carvedilol] Other (See Comments)     Nausea/vomiting    Allopurinol      Other reaction(s): abnormal transaminases       Home Medications:  Prior to Admission medications    Medication Sig Start Date End Date Taking? Authorizing Provider   acetaminophen (TYLENOL) 500 MG tablet Take 500 mg by mouth every 6 (six) hours as needed for Pain.    Historical Provider   apixaban (ELIQUIS) 2.5 mg Tab Take 1 tablet (2.5 mg total) by mouth 2 (two) times daily. 9/14/22   Sowmya Stock NP   CABOMETYX 60 mg Tab TAKE ONE TABLET BY MOUTH ONCE DAILY AT THE SAME TIME ON AN EMPTY STOMACH AT LEAST 1 HOUR BEFORE OR 2 HOURS AFTER EATING. AVOID GRAPEFRUIT PRODUCTS 7/28/22   Liset Ngo NP   cloNIDine 0.3 mg/24 hr td ptwk (CATAPRES) 0.3 mg/24 hr Place 1 patch onto the skin every 7 days. 10/26/22 10/26/23  Sowmya Mccain MD   epoetin david-epbx (RETACRIT INJ) Epoetin david - epbx (Retacrit) 3/8/22 3/7/23  Historical Provider   hydrALAZINE (APRESOLINE) 100 MG tablet Take 1 tablet (100 mg total) by mouth every 12 (twelve) hours. 10/24/22 1/22/23  Edson Murdock MD   lidocaine-prilocaine (EMLA) cream APPLY ATLEAST 30 MINUTES  BEFORE TREATMENT 3 TIMES A WEEK 2/11/20   TONA Millard   metoprolol succinate (TOPROL-XL) 100 MG 24 hr tablet Take 1 tablet (100 mg total) by mouth once daily. 7/5/22   TONA Millard   mv,Ca,min-folic acid-vit K1 (ONE-A-DAY WOMEN'S 50 PLUS) 400-20 mcg Tab Take 1 tablet by mouth. 3/10/22   Historical Provider   ondansetron (ZOFRAN-ODT) 8 MG TbDL Take 1 tablet (8 mg total) by mouth every 12 (twelve) hours as needed. 10/6/22 10/6/23  Ben Rivera MD   oxyCODONE-acetaminophen (PERCOCET) 5-325 mg per tablet Take 1 tablet by mouth every 6 (six) hours as needed for Pain. 10/6/22   Ben Rivera MD   oxyCODONE-acetaminophen (PERCOCET) 5-325 mg per tablet Take 1 tablet by mouth every 4 (four) hours as needed. 10/25/22   Leeroy Nix MD   predniSONE (DELTASONE) 20 MG tablet Take 2 tablets (40 mg total) by mouth once daily. for 4 days 10/25/22 10/29/22  Leeroy Nix MD   promethazine (PHENERGAN) 25 MG tablet Take 1 tablet (25 mg total) by mouth every 6 (six) hours as needed for Nausea. 10/25/22   Leeroy Nix MD   sacubitriL-valsartan (ENTRESTO) 49-51 mg per tablet Take 1 tablet by mouth 2 (two) times daily. 10/27/22   Leora Louis MD   sevelamer carbonate (RENVELA) 800 mg Tab TAKE 2 TABLETS (1,600 MG TOTAL) BY MOUTH 3 (THREE) TIMES DAILY WITH MEALS.  Patient not taking: Reported on 10/26/2022 8/8/22 11/6/22  Edson Murdock MD   sodium chloride 0.9% SolP 100 mL with iron sucrose 100 mg iron/5 mL Soln 100 mg 50 mg. 4/19/22 4/11/23  Historical Provider   amLODIPine (NORVASC) 5 MG tablet TAKE 1 TABLET BY MOUTH EVERY DAY 10/14/21 6/29/22  Williams Hart MD       Family History:  Family History   Problem Relation Age of Onset    Hypertension Mother     Diabetes Father     Kidney disease Father     No Known Problems Son     No Known Problems Son     Diabetes Maternal Grandfather     No Known Problems Sister     No Known Problems Brother     No Known Problems Maternal Grandmother     No  "Known Problems Paternal Grandmother     No Known Problems Paternal Grandfather     Heart disease Sister     No Known Problems Sister     No Known Problems Brother     No Known Problems Maternal Aunt     No Known Problems Maternal Uncle     No Known Problems Paternal Aunt     No Known Problems Paternal Uncle     Breast cancer Neg Hx     Colon cancer Neg Hx     Cancer Neg Hx     Stroke Neg Hx     Amblyopia Neg Hx     Blindness Neg Hx     Cataracts Neg Hx     Glaucoma Neg Hx     Macular degeneration Neg Hx     Retinal detachment Neg Hx     Strabismus Neg Hx     Thyroid disease Neg Hx        Social History:  Social History     Tobacco Use    Smoking status: Never    Smokeless tobacco: Never   Substance Use Topics    Alcohol use: No     Comment: . 2 children. works at [employer].    Drug use: No       Review of Systems:  Pertinent positives and negatives are listed in HPI.  All other systems are reviewed and are negative.     Objective:   Last 24 Hour Vital Signs:  BP  Min: 161/77  Max: 196/86  Temp  Av.7 °F (34.3 °C)  Min: 93.6 °F (34.2 °C)  Max: 93.9 °F (34.4 °C)  Pulse  Av.5  Min: 86  Max: 90  Resp  Av.5  Min: 16  Max: 26  SpO2  Av %  Min: 98 %  Max: 100 %  Height  Av' 4" (162.6 cm)  Min: 5' 4" (162.6 cm)  Max: 5' 4" (162.6 cm)  Weight  Av.2 kg (115 lb)  Min: 52.2 kg (115 lb)  Max: 52.2 kg (115 lb)  Body mass index is 19.74 kg/m².  No intake/output data recorded.    Physical Examination:  General: mild acute distress,   Cardiovascular:  s1/s2  Chest/Pulmonary:  CTABL, normal work of breathing without accessory muscle use,   Abdomen:  soft, nontender, nondistended, bowel sounds heard in all four quadrants and normo-active   Neurologic: AAOx3,     Laboratory:  Most Recent Data:  CBC:   Lab Results   Component Value Date    WBC 14.59 (H) 10/29/2022    HGB 9.1 (L) 10/29/2022    HCT 29.8 (L) 10/29/2022     10/29/2022     (H) 10/29/2022    RDW 21.6 (H) 10/29/2022     BMP:   Lab " Results   Component Value Date     10/29/2022    K 6.0 (H) 10/29/2022     10/29/2022    CO2 8 (LL) 10/29/2022    BUN 78 (H) 10/29/2022    CREATININE 6.1 (H) 10/29/2022    GLU 71 10/29/2022    CALCIUM 10.6 (H) 10/29/2022    MG 2.2 10/06/2022    PHOS 4.5 10/07/2022     LFTs:   Lab Results   Component Value Date    PROT 6.5 10/29/2022    ALBUMIN 3.0 (L) 10/29/2022    BILITOT 2.8 (H) 10/29/2022     (H) 10/29/2022    ALKPHOS 249 (H) 10/29/2022     (H) 10/29/2022     (H) 10/12/2006     Coags:   Lab Results   Component Value Date    INR 1.1 09/24/2022     Urinalysis:   Lab Results   Component Value Date    LABURIN No significant growth 06/04/2016    COLORU straw 12/16/2016    SPECGRAV 1,005 12/16/2016    NITRITE neg 12/16/2016    KETONESU neg 12/16/2016    UROBILINOGEN neg 12/16/2016    WBCUA 0 11/20/2016       Trended Lab Data:  Recent Labs   Lab 10/25/22  1141 10/29/22  0828   WBC 7.43 14.59*   HGB 9.6* 9.1*   HCT 30.1* 29.8*    271   * 111*   RDW 19.9* 21.6*    143   K 3.3* 6.0*    101   CO2 25 8*   BUN 22* 78*   CREATININE 3.8* 6.1*   * 71   PROT 7.4 6.5   ALBUMIN 3.1* 3.0*   BILITOT 1.5* 2.8*   AST 53* 295*   ALKPHOS 257* 249*   ALT 28 114*       Trended Cardiac Data:  Recent Labs   Lab 10/25/22  1141 10/29/22  0828   TROPONINI 0.327* 2.896*   BNP >4,900* >4,900*       Radiology:  Imaging Results              CT Head Without Contrast (Final result)  Result time 10/29/22 08:09:09      Final result by Edmond Queen MD (10/29/22 08:09:09)                   Impression:      No CT evidence of acute intracranial abnormality.    Paranasal sinus disease.    Small right and trace left mastoid effusions.      Electronically signed by: Edmond Queen MD  Date:    10/29/2022  Time:    08:09               Narrative:    EXAMINATION:  CT HEAD WITHOUT CONTRAST    CLINICAL HISTORY:  Mental status change, unknown cause;    TECHNIQUE:  Low dose axial images were  "obtained through the head.  Coronal and sagittal reformations were also performed. Contrast was not administered.    COMPARISON:  MRI brain, 01/19/2016.    FINDINGS:  No evidence of acute territorial infarct, hemorrhage, mass effect, or midline shift.    Ventricles are normal in size and configuration.    No displaced calvarial fracture.    Partially visualized complete opacification of the left maxillary sinus.  Near complete opacification of the anterior left ethmoid air cells.  No significant mucosal thickening in the left frontal sinus.  Significant mucosal thickening in the right maxillary sinus and mild mucosal thickening in the sphenoid sinus.  No air-fluid levels.  Small right and trace left mastoid effusions.                                       X-Ray Chest 1 View (Final result)  Result time 10/29/22 07:50:25      Final result by Edmond Queen MD (10/29/22 07:50:25)                   Impression:      Cardiomegaly and probable mild interstitial edema.  No detrimental change when compared with 10/25/2022 allowing for differences in lung volumes and technique.      Electronically signed by: Edmond Queen MD  Date:    10/29/2022  Time:    07:50               Narrative:    EXAMINATION:  XR CHEST 1 VIEW    CLINICAL HISTORY:  Provided history is "  Weakness".    TECHNIQUE:  One view of the chest.    COMPARISON:  10/25/2022.    FINDINGS:  Cardiac wires overlie the chest.  Lung volumes are relatively low, accentuating basilar markings.  Cardiomediastinal silhouette is enlarged and similar to the prior study.  Atherosclerotic calcifications overlie the aortic arch.  There is central vascular congestion with prominent perihilar interstitial lung markings suggestive of interstitial edema.  No new area of consolidation.  No large pleural effusion.  No pneumothorax.  Left chest wall AICD and left axillary vascular stent again noted.  Surgical clips overlie the left upper extremity.                                "          Assessment and Plan:      Eva Bloom is a 57 y.o.female with       # sepsis s/p 1 time dose  vanco and she is on zosyn    # severe metabolic acidosis on IV bicarb , agree ,continue to monitor     # hyperkalemia on lokelma     #) ESRD on HD  TTS   - last HD was on Thursday ,will consider HD for today   - dose meds for HD   - nephro caps  - strict I/O, daily weights  - please avoid gadolinium, fleets, phos-based laxatives, NSAIDs      #) Hypertension   Hold antihypertensive medications prior to dialysis session and reevaluate after dialysis   Goal BP <130/80    #)Anemia of chronic disease    -H/H      Lab Results   Component Value Date    HGB 9.1 (L) 10/29/2022    HCT 29.8 (L) 10/29/2022     -  Lab Results   Component Value Date    IRON 141 01/03/2017    TIBC 192 (L) 01/03/2017    FERRITIN 4,699 (H) 03/14/2020       #)Mineral bone disease  Lab Results   Component Value Date    CALCIUM 10.6 (H) 10/29/2022    MG 2.2 10/06/2022    PHOS 4.5 10/07/2022    .0 (H) 10/05/2015       PAF/ICD/cardiomyopathy nonischemic ,cardiology is following         More then 35 minutes spent with patient in ICU    Thank you for your consult. I will follow-up with patient. Please contact us if you have any additional questions.

## 2022-10-29 NOTE — SUBJECTIVE & OBJECTIVE
Past Medical History:   Diagnosis Date    Anemia     Bronchitis 03/01/2017    Cancer 2016    kidney cancer    CHF (congestive heart failure), NYHA class II, chronic, systolic     CMV (cytomegalovirus) antibody positive     Essential hypertension 9/23/2015    H/O herpes simplex type 2 infection     Herpes simplex type 1 antibody positive     History of kidney cancer     s/p left nephrectomy 1/2016    Hyperparathyroidism, unspecified     Hyperuricemia without signs of inflammatory arthritis and tophaceous disease     Kidney stones     LGSIL (low grade squamous intraepithelial dysplasia)     Myocardiopathy 7/21/2017    Prediabetes     Proteinuria     Renal disorder     Thyroid nodule     Urate nephropathy        Past Surgical History:   Procedure Laterality Date    BREAST CYST EXCISION      COLONOSCOPY N/A 11/12/2015    COLONOSCOPY N/A 3/12/2021    Procedure: COLONOSCOPY;  Surgeon: Brendon Lanier MD;  Location: [facility];  Service: Endoscopy;  Laterality: N/A;  covid test 3/9, labs prior, prep instr mailed -ml    INSERTION OF BIVENTRICULAR IMPLANTABLE CARDIOVERTER-DEFIBRILLATOR (ICD)  04/2021    NEPHRECTOMY-LAPAROSCOPIC Left 01/12/2016    PERITONEAL CATHETER INSERTION      Permacath insertion  01/12/2017    SALPINGOOPHORECTOMY Right 2016    KJB---DAVINCI    TONSILLECTOMY      TUBAL LIGATION         Review of patient's allergies indicates:   Allergen Reactions    Coreg [carvedilol] Other (See Comments)     Nausea/vomiting    Allopurinol      Other reaction(s): abnormal transaminases       No current facility-administered medications on file prior to encounter.     Current Outpatient Medications on File Prior to Encounter   Medication Sig    acetaminophen (TYLENOL) 500 MG tablet Take 500 mg by mouth every 6 (six) hours as needed for Pain.    apixaban (ELIQUIS) 2.5 mg Tab Take 1 tablet (2.5 mg total) by mouth 2 (two) times daily.    CABOMETYX 60 mg Tab TAKE ONE TABLET BY MOUTH ONCE DAILY AT THE SAME TIME ON AN EMPTY  STOMACH AT LEAST 1 HOUR BEFORE OR 2 HOURS AFTER EATING. AVOID GRAPEFRUIT PRODUCTS    cloNIDine 0.3 mg/24 hr td ptwk (CATAPRES) 0.3 mg/24 hr Place 1 patch onto the skin every 7 days.    epoetin david-epbx (RETACRIT INJ) Epoetin david - epbx (Retacrit)    hydrALAZINE (APRESOLINE) 100 MG tablet Take 1 tablet (100 mg total) by mouth every 12 (twelve) hours.    lidocaine-prilocaine (EMLA) cream APPLY ATLEAST 30 MINUTES BEFORE TREATMENT 3 TIMES A WEEK    metoprolol succinate (TOPROL-XL) 100 MG 24 hr tablet Take 1 tablet (100 mg total) by mouth once daily.    mv,Ca,min-folic acid-vit K1 (ONE-A-DAY WOMEN'S 50 PLUS) 400-20 mcg Tab Take 1 tablet by mouth.    ondansetron (ZOFRAN-ODT) 8 MG TbDL Take 1 tablet (8 mg total) by mouth every 12 (twelve) hours as needed.    oxyCODONE-acetaminophen (PERCOCET) 5-325 mg per tablet Take 1 tablet by mouth every 6 (six) hours as needed for Pain.    oxyCODONE-acetaminophen (PERCOCET) 5-325 mg per tablet Take 1 tablet by mouth every 4 (four) hours as needed.    predniSONE (DELTASONE) 20 MG tablet Take 2 tablets (40 mg total) by mouth once daily. for 4 days    promethazine (PHENERGAN) 25 MG tablet Take 1 tablet (25 mg total) by mouth every 6 (six) hours as needed for Nausea.    sacubitriL-valsartan (ENTRESTO) 49-51 mg per tablet Take 1 tablet by mouth 2 (two) times daily.    sevelamer carbonate (RENVELA) 800 mg Tab TAKE 2 TABLETS (1,600 MG TOTAL) BY MOUTH 3 (THREE) TIMES DAILY WITH MEALS. (Patient not taking: Reported on 10/26/2022)    sodium chloride 0.9% SolP 100 mL with iron sucrose 100 mg iron/5 mL Soln 100 mg 50 mg.    [DISCONTINUED] amLODIPine (NORVASC) 5 MG tablet TAKE 1 TABLET BY MOUTH EVERY DAY     Family History       Problem Relation (Age of Onset)    Diabetes Father, Maternal Grandfather    Heart disease Sister    Hypertension Mother    Kidney disease Father    No Known Problems Son, Son, Sister, Brother, Maternal Grandmother, Paternal Grandmother, Paternal Grandfather, Sister,  Brother, Maternal Aunt, Maternal Uncle, Paternal Aunt, Paternal Uncle          Tobacco Use    Smoking status: Never    Smokeless tobacco: Never   Substance and Sexual Activity    Alcohol use: No     Comment: . 2 children. works at [employer].    Drug use: No    Sexual activity: Yes     Partners: Male     Review of Systems   Unable to perform ROS: Acuity of condition   Objective:     Vital Signs (Most Recent):  Temp: (!) 93.6 °F (34.2 °C) (10/29/22 1105)  Pulse: 90 (10/29/22 1105)  Resp: (!) 25 (10/29/22 1105)  BP: (!) 196/86 (10/29/22 1105)  SpO2: 99 % (10/29/22 1105)   Vital Signs (24h Range):  Temp:  [93.6 °F (34.2 °C)-93.7 °F (34.3 °C)] 93.6 °F (34.2 °C)  Pulse:  [86-90] 90  Resp:  [16-25] 25  SpO2:  [99 %-100 %] 99 %  BP: (180-196)/(81-91) 196/86     Weight: 52.2 kg (115 lb)  Body mass index is 19.74 kg/m².    SpO2: 99 %  O2 Device (Oxygen Therapy): room air      Intake/Output Summary (Last 24 hours) at 10/29/2022 1137  Last data filed at 10/29/2022 1105  Gross per 24 hour   Intake 225 ml   Output --   Net 225 ml       Lines/Drains/Airways       Central Venous Catheter Line  Duration                  Hemodialysis AV Graft Left upper arm -- days         Hemodialysis Catheter 01/12/17 0627 2116 days              Drain  Duration                  Hemodialysis AV Fistula Left upper arm -- days         Hemodialysis AV Fistula 01/12/17 0629 Left upper arm 2116 days              Peripheral Intravenous Line  Duration                  Peripheral IV - Single Lumen 10/29/22 0824 22 G Anterior;Right Hand <1 day         Peripheral IV - Single Lumen 10/29/22 0836 20 G Anterior;Distal;Right Upper Arm <1 day                    Physical Exam  Constitutional:       Appearance: She is well-developed.   HENT:      Head: Normocephalic and atraumatic.   Eyes:      Conjunctiva/sclera: Conjunctivae normal.      Pupils: Pupils are equal, round, and reactive to light.   Cardiovascular:      Rate and Rhythm: Normal rate.      Pulses:  Intact distal pulses.      Heart sounds: Normal heart sounds.   Pulmonary:      Effort: Pulmonary effort is normal.      Breath sounds: Normal breath sounds.   Abdominal:      General: Bowel sounds are normal.      Palpations: Abdomen is soft.   Musculoskeletal:         General: Normal range of motion.      Cervical back: Normal range of motion and neck supple.   Skin:     General: Skin is warm and dry.   Neurological:      Mental Status: She is alert and oriented to person, place, and time.       Significant Labs: All pertinent lab results from the last 24 hours have been reviewed.    Significant Imaging: Echocardiogram: Transthoracic echo (TTE) complete (Cupid Only):   Results for orders placed or performed during the hospital encounter of 06/28/22   Echo   Result Value Ref Range    BSA 1.58 m2    TDI SEPTAL 0.04 m/s    LV LATERAL E/E' RATIO 20.17 m/s    LV SEPTAL E/E' RATIO 30.25 m/s    LA WIDTH 4.65 cm    TDI LATERAL 0.06 m/s    PV PEAK VELOCITY 1.28 cm/s    LVIDd 5.53 3.5 - 6.0 cm    IVS 1.35 (A) 0.6 - 1.1 cm    Posterior Wall 1.19 (A) 0.6 - 1.1 cm    LVIDs 4.61 (A) 2.1 - 4.0 cm    FS 17 28 - 44 %    LA volume 103.18 cm3    Sinus 3.47 cm    STJ 2.47 cm    Ascending aorta 3.10 cm    LV mass 297.16 g    LA size 4.03 cm    RVDD 4.99 cm    TAPSE 2.34 cm    RV S' 14.05 cm/s    Left Ventricle Relative Wall Thickness 0.43 cm    AV mean gradient 8 mmHg    AV valve area 1.95 cm2    AV Velocity Ratio 0.56     AV index (prosthetic) 0.63     E/A ratio 0.97     Mean e' 0.05 m/s    E wave deceleration time 190.48 msec    IVRT 80.74 msec    Pulm vein S/D ratio 1.15     LVOT diameter 1.99 cm    LVOT area 3.1 cm2    LVOT peak subhash 1.12 m/s    LVOT peak VTI 22.27 cm    Ao peak subhash 2.00 m/s    Ao VTI 35.44 cm    Mr max subhash 0.05 m/s    LVOT stroke volume 69.23 cm3    AV peak gradient 16 mmHg    E/E' ratio 24.20 m/s    MV Peak E Subhash 1.21 m/s    TR Max Subhash 3.31 m/s    MV Peak A Subhash 1.25 m/s    PV Peak S Subhash 0.46 m/s    PV Peak D Subhash  0.40 m/s    LV Systolic Volume 97.65 mL    LV Systolic Volume Index 61.4 mL/m2    LV Diastolic Volume 149.56 mL    LV Diastolic Volume Index 94.06 mL/m2    LA Volume Index 64.9 mL/m2    LV Mass Index 187 g/m2    RA Major Axis 5.68 cm    Left Atrium Minor Axis 6.35 cm    Left Atrium Major Axis 6.61 cm    Triscuspid Valve Regurgitation Peak Gradient 44 mmHg    RA Width 4.86 cm    Right Atrial Pressure (from IVC) 3 mmHg    EF 35 %    TV rest pulmonary artery pressure 47 mmHg    Narrative    · The left ventricle is normal in size with mild concentric hypertrophy   and moderately decreased systolic function.  · Moderate posterior pericardial effusion.  · Moderate right atrial enlargement.  · Mild tricuspid regurgitation.  · Severe left atrial enlargement.  · Mild pulmonic regurgitation.  · The estimated ejection fraction is 35%.  · Grade II left ventricular diastolic dysfunction.  · Moderate right ventricular enlargement with mildly reduced right   ventricular systolic function.  · Mild mitral regurgitation.  · Normal central venous pressure (3 mmHg).  · The estimated PA systolic pressure is 47 mmHg.  · There is pulmonary hypertension.  · Mild aortic regurgitation.

## 2022-10-29 NOTE — ASSESSMENT & PLAN NOTE
Troponin 2.9. No acute EKG changes. Previous NICM - baseline EF 35% Suspect this is demand ischemia from sepsis, acidosis, HTN. Repeat echo - if stable will f/u prn

## 2022-10-30 LAB
ALBUMIN SERPL BCP-MCNC: 2.4 G/DL (ref 3.5–5.2)
ALP SERPL-CCNC: 219 U/L (ref 55–135)
ALT SERPL W/O P-5'-P-CCNC: 181 U/L (ref 10–44)
ANION GAP SERPL CALC-SCNC: 17 MMOL/L (ref 8–16)
AORTIC ROOT ANNULUS: 3.35 CM
AORTIC VALVE CUSP SEPERATION: 1.97 CM
ASCENDING AORTA: 3.2 CM
AST SERPL-CCNC: 482 U/L (ref 10–40)
AV INDEX (PROSTH): 0.68
AV MEAN GRADIENT: 10 MMHG
AV PEAK GRADIENT: 18 MMHG
AV REGURGITATION PRESSURE HALF TIME: 558.17 MS
AV VALVE AREA: 2.11 CM2
AV VELOCITY RATIO: 0.63
BASOPHILS # BLD AUTO: 0 K/UL (ref 0–0.2)
BASOPHILS NFR BLD: 0 % (ref 0–1.9)
BILIRUB SERPL-MCNC: 3 MG/DL (ref 0.1–1)
BSA FOR ECHO PROCEDURE: 1.53 M2
BUN SERPL-MCNC: 59 MG/DL (ref 6–20)
CALCIUM SERPL-MCNC: 9.1 MG/DL (ref 8.7–10.5)
CHLORIDE SERPL-SCNC: 96 MMOL/L (ref 95–110)
CO2 SERPL-SCNC: 28 MMOL/L (ref 23–29)
CREAT SERPL-MCNC: 4.8 MG/DL (ref 0.5–1.4)
CV ECHO LV RWT: 0.62 CM
DIFFERENTIAL METHOD: ABNORMAL
DOP CALC AO PEAK VEL: 2.11 M/S
DOP CALC AO VTI: 29.3 CM
DOP CALC LVOT AREA: 3.1 CM2
DOP CALC LVOT DIAMETER: 1.99 CM
DOP CALC LVOT PEAK VEL: 1.33 M/S
DOP CALC LVOT STROKE VOLUME: 61.86 CM3
DOP CALCLVOT PEAK VEL VTI: 19.9 CM
E WAVE DECELERATION TIME: 190.77 MSEC
E/A RATIO: 1.09
E/E' RATIO: 26.44 M/S
ECHO LV POSTERIOR WALL: 1.67 CM (ref 0.6–1.1)
EJECTION FRACTION: 25 %
EOSINOPHIL # BLD AUTO: 0 K/UL (ref 0–0.5)
EOSINOPHIL NFR BLD: 0 % (ref 0–8)
ERYTHROCYTE [DISTWIDTH] IN BLOOD BY AUTOMATED COUNT: 20.7 % (ref 11.5–14.5)
EST. GFR  (NO RACE VARIABLE): 10 ML/MIN/1.73 M^2
FERRITIN SERPL-MCNC: 2411 NG/ML (ref 20–300)
FOLATE SERPL-MCNC: 16.8 NG/ML (ref 4–24)
FRACTIONAL SHORTENING: 17 % (ref 28–44)
GLUCOSE SERPL-MCNC: 153 MG/DL (ref 70–110)
HAV IGM SERPL QL IA: NORMAL
HBV CORE IGM SERPL QL IA: NORMAL
HBV SURFACE AG SERPL QL IA: NORMAL
HBV SURFACE AG SERPL QL IA: NORMAL
HCT VFR BLD AUTO: 27.1 % (ref 37–48.5)
HCV AB SERPL QL IA: NORMAL
HGB BLD-MCNC: 8.8 G/DL (ref 12–16)
HIV 1+2 AB+HIV1 P24 AG SERPL QL IA: NORMAL
IMM GRANULOCYTES # BLD AUTO: 0.04 K/UL (ref 0–0.04)
IMM GRANULOCYTES NFR BLD AUTO: 0.4 % (ref 0–0.5)
INTERVENTRICULAR SEPTUM: 1.53 CM (ref 0.6–1.1)
IRON SERPL-MCNC: 73 UG/DL (ref 30–160)
IVC DIAMETER: 1.96 CM
LA MAJOR: 5.91 CM
LA MINOR: 6.51 CM
LA WIDTH: 4.8 CM
LACTATE SERPL-SCNC: 1.5 MMOL/L (ref 0.5–2.2)
LACTATE SERPL-SCNC: 2.2 MMOL/L (ref 0.5–2.2)
LEFT ATRIUM SIZE: 3.57 CM
LEFT ATRIUM VOLUME INDEX: 59.4 ML/M2
LEFT ATRIUM VOLUME: 90.24 CM3
LEFT INTERNAL DIMENSION IN SYSTOLE: 4.5 CM (ref 2.1–4)
LEFT VENTRICLE DIASTOLIC VOLUME INDEX: 92.49 ML/M2
LEFT VENTRICLE DIASTOLIC VOLUME: 140.59 ML
LEFT VENTRICLE MASS INDEX: 262 G/M2
LEFT VENTRICLE SYSTOLIC VOLUME INDEX: 61 ML/M2
LEFT VENTRICLE SYSTOLIC VOLUME: 92.68 ML
LEFT VENTRICULAR INTERNAL DIMENSION IN DIASTOLE: 5.39 CM (ref 3.5–6)
LEFT VENTRICULAR MASS: 397.67 G
LV LATERAL E/E' RATIO: 23.8 M/S
LV SEPTAL E/E' RATIO: 29.75 M/S
LVOT MG: 3.09 MMHG
LVOT MV: 0.81 CM/S
LYMPHOCYTES # BLD AUTO: 0 K/UL (ref 1–4.8)
LYMPHOCYTES NFR BLD: 0 % (ref 18–48)
MCH RBC QN AUTO: 32.4 PG (ref 27–31)
MCHC RBC AUTO-ENTMCNC: 32.5 G/DL (ref 32–36)
MCV RBC AUTO: 100 FL (ref 82–98)
MONOCYTES # BLD AUTO: 0.4 K/UL (ref 0.3–1)
MONOCYTES NFR BLD: 3.6 % (ref 4–15)
MV PEAK A VEL: 1.09 M/S
MV PEAK E VEL: 1.19 M/S
MV STENOSIS PRESSURE HALF TIME: 55.44 MS
MV VALVE AREA P 1/2 METHOD: 3.97 CM2
NEUTROPHILS # BLD AUTO: 10.8 K/UL (ref 1.8–7.7)
NEUTROPHILS NFR BLD: 96 % (ref 38–73)
NRBC BLD-RTO: 10 /100 WBC
PISA AR MAX VEL: 2.8 M/S
PISA MRMAX VEL: 5.63 M/S
PISA TR MAX VEL: 3.36 M/S
PLATELET # BLD AUTO: 206 K/UL (ref 150–450)
PMV BLD AUTO: 10 FL (ref 9.2–12.9)
POTASSIUM SERPL-SCNC: 4 MMOL/L (ref 3.5–5.1)
PROCALCITONIN SERPL IA-MCNC: 1.57 NG/ML
PROT SERPL-MCNC: 5.2 G/DL (ref 6–8.4)
PV PEAK VELOCITY: 1 CM/S
RA MAJOR: 6.11 CM
RA PRESSURE: 15 MMHG
RA WIDTH: 4.4 CM
RBC # BLD AUTO: 2.72 M/UL (ref 4–5.4)
RIGHT VENTRICULAR END-DIASTOLIC DIMENSION: 3.91 CM
SATURATED IRON: 32 % (ref 20–50)
SINUS: 3.17 CM
SODIUM SERPL-SCNC: 141 MMOL/L (ref 136–145)
STJ: 2.62 CM
TDI LATERAL: 0.05 M/S
TDI SEPTAL: 0.04 M/S
TDI: 0.05 M/S
TOTAL IRON BINDING CAPACITY: 228 UG/DL (ref 250–450)
TR MAX PG: 45 MMHG
TRANSFERRIN SERPL-MCNC: 154 MG/DL (ref 200–375)
TRICUSPID ANNULAR PLANE SYSTOLIC EXCURSION: 2.21 CM
TV REST PULMONARY ARTERY PRESSURE: 60 MMHG
VIT B12 SERPL-MCNC: >2000 PG/ML (ref 210–950)
WBC # BLD AUTO: 11.26 K/UL (ref 3.9–12.7)

## 2022-10-30 PROCEDURE — 25000003 PHARM REV CODE 250: Performed by: HOSPITALIST

## 2022-10-30 PROCEDURE — 82728 ASSAY OF FERRITIN: CPT | Performed by: HOSPITALIST

## 2022-10-30 PROCEDURE — 85025 COMPLETE CBC W/AUTO DIFF WBC: CPT | Performed by: HOSPITALIST

## 2022-10-30 PROCEDURE — 84466 ASSAY OF TRANSFERRIN: CPT | Performed by: HOSPITALIST

## 2022-10-30 PROCEDURE — 63600175 PHARM REV CODE 636 W HCPCS: Performed by: INTERNAL MEDICINE

## 2022-10-30 PROCEDURE — 99233 SBSQ HOSP IP/OBS HIGH 50: CPT | Mod: ,,, | Performed by: SURGERY

## 2022-10-30 PROCEDURE — 63600175 PHARM REV CODE 636 W HCPCS: Performed by: HOSPITALIST

## 2022-10-30 PROCEDURE — 83605 ASSAY OF LACTIC ACID: CPT | Performed by: INTERNAL MEDICINE

## 2022-10-30 PROCEDURE — 63600175 PHARM REV CODE 636 W HCPCS: Performed by: CLINIC/CENTER

## 2022-10-30 PROCEDURE — 36415 COLL VENOUS BLD VENIPUNCTURE: CPT | Performed by: HOSPITALIST

## 2022-10-30 PROCEDURE — 25000003 PHARM REV CODE 250: Performed by: INTERNAL MEDICINE

## 2022-10-30 PROCEDURE — 94761 N-INVAS EAR/PLS OXIMETRY MLT: CPT

## 2022-10-30 PROCEDURE — 20000000 HC ICU ROOM

## 2022-10-30 PROCEDURE — 83605 ASSAY OF LACTIC ACID: CPT | Mod: 91 | Performed by: HOSPITALIST

## 2022-10-30 PROCEDURE — 80053 COMPREHEN METABOLIC PANEL: CPT | Performed by: HOSPITALIST

## 2022-10-30 PROCEDURE — 84145 PROCALCITONIN (PCT): CPT | Performed by: INTERNAL MEDICINE

## 2022-10-30 PROCEDURE — 99233 PR SUBSEQUENT HOSPITAL CARE,LEVL III: ICD-10-PCS | Mod: ,,, | Performed by: SURGERY

## 2022-10-30 RX ORDER — LABETALOL HYDROCHLORIDE 5 MG/ML
10 INJECTION, SOLUTION INTRAVENOUS EVERY 6 HOURS PRN
Status: DISCONTINUED | OUTPATIENT
Start: 2022-10-30 | End: 2022-11-06 | Stop reason: HOSPADM

## 2022-10-30 RX ORDER — ONDANSETRON 2 MG/ML
4 INJECTION INTRAMUSCULAR; INTRAVENOUS ONCE
Status: COMPLETED | OUTPATIENT
Start: 2022-10-30 | End: 2022-10-30

## 2022-10-30 RX ADMIN — LACTULOSE 10 G: 10 SOLUTION ORAL at 02:10

## 2022-10-30 RX ADMIN — SACUBITRIL AND VALSARTAN 2 TABLET: 24; 26 TABLET, FILM COATED ORAL at 09:10

## 2022-10-30 RX ADMIN — LACTULOSE 10 G: 10 SOLUTION ORAL at 09:10

## 2022-10-30 RX ADMIN — ONDANSETRON 4 MG: 2 INJECTION INTRAMUSCULAR; INTRAVENOUS at 01:10

## 2022-10-30 RX ADMIN — PIPERACILLIN AND TAZOBACTAM 4.5 G: 4; .5 INJECTION, POWDER, LYOPHILIZED, FOR SOLUTION INTRAVENOUS; PARENTERAL at 09:10

## 2022-10-30 RX ADMIN — HYDRALAZINE HYDROCHLORIDE 100 MG: 25 TABLET, FILM COATED ORAL at 05:10

## 2022-10-30 RX ADMIN — RIFAXIMIN 550 MG: 550 TABLET ORAL at 09:10

## 2022-10-30 RX ADMIN — HYDRALAZINE HYDROCHLORIDE 20 MG: 20 INJECTION INTRAMUSCULAR; INTRAVENOUS at 12:10

## 2022-10-30 RX ADMIN — SODIUM ZIRCONIUM CYCLOSILICATE 10 G: 10 POWDER, FOR SUSPENSION ORAL at 09:10

## 2022-10-30 RX ADMIN — METOPROLOL SUCCINATE 100 MG: 50 TABLET, EXTENDED RELEASE ORAL at 09:10

## 2022-10-30 RX ADMIN — SODIUM BICARBONATE: 84 INJECTION, SOLUTION INTRAVENOUS at 02:10

## 2022-10-30 RX ADMIN — MUPIROCIN: 20 OINTMENT TOPICAL at 09:10

## 2022-10-30 RX ADMIN — AMLODIPINE BESYLATE 10 MG: 5 TABLET ORAL at 09:10

## 2022-10-30 RX ADMIN — LABETALOL HYDROCHLORIDE 10 MG: 5 INJECTION INTRAVENOUS at 01:10

## 2022-10-30 RX ADMIN — HYDRALAZINE HYDROCHLORIDE 100 MG: 25 TABLET, FILM COATED ORAL at 09:10

## 2022-10-30 NOTE — ANESTHESIA PROCEDURE NOTES
Central Line    Diagnosis: inadequate intravascular access  Patient location during procedure: ICU  Timeout: 10/29/2022 8:55 PM  Procedure end time: 10/29/2022 9:30 PM    Staffing  Authorizing Provider: Osmar Marr MD  Performing Provider: Osmar Marr MD    Staffing  Performed: anesthesiologist   Anesthesiologist: Osmar Marr MD  Anesthesiologist was present at the time of the procedure.  Preanesthetic Checklist  Completed: patient identified, IV checked, risks and benefits discussed, monitors and equipment checked, timeout performed and anesthesia consent given  Indication   Indication: vascular access, med administration     Anesthesia   local infiltration    Central Line   Skin Prep: skin prepped with ChloraPrep, skin prep agent completely dried prior to procedure  Sterile Barriers Followed: Yes    All five maximal barriers used- gloves, gown, cap, mask, and large sterile sheet    hand hygiene performed prior to central venous catheter insertion  Location: left internal jugular.   Catheter type: triple lumen  Catheter Size: 7 Fr  Inserted Catheter Length: 16 cm  Ultrasound: vascular probe with ultrasound  Vessel Caliber: large, patent  Vascular Doppler:  not done, compressibility normal  Needle advanced into vessel with real time Ultrasound guidance.  Guidewire confirmed in vessel.  Image recorded and saved.  sterile gel and probe cover used in ultrasound-guided central venous catheter insertion  Manometry: none  Insertion Attempts: 1   Securement:line sutured, chlorhexidine patch, sterile dressing applied and blood return through all ports    Post-Procedure   Adverse Events:none      Guidewire Guidewire removed intact, verified with nurse.

## 2022-10-30 NOTE — SUBJECTIVE & OBJECTIVE
Past Medical History:   Diagnosis Date    Anemia     Bronchitis 03/01/2017    Cancer 2016    kidney cancer    CHF (congestive heart failure), NYHA class II, chronic, systolic     CMV (cytomegalovirus) antibody positive     Essential hypertension 9/23/2015    H/O herpes simplex type 2 infection     Herpes simplex type 1 antibody positive     History of kidney cancer     s/p left nephrectomy 1/2016    Hyperparathyroidism, unspecified     Hyperuricemia without signs of inflammatory arthritis and tophaceous disease     Kidney stones     LGSIL (low grade squamous intraepithelial dysplasia)     Myocardiopathy 7/21/2017    Prediabetes     Proteinuria     Renal disorder     Thyroid nodule     Urate nephropathy        Past Surgical History:   Procedure Laterality Date    BREAST CYST EXCISION      COLONOSCOPY N/A 11/12/2015    COLONOSCOPY N/A 3/12/2021    Procedure: COLONOSCOPY;  Surgeon: Brendon Lanier MD;  Location: [facility];  Service: Endoscopy;  Laterality: N/A;  covid test 3/9, labs prior, prep instr mailed -ml    INSERTION OF BIVENTRICULAR IMPLANTABLE CARDIOVERTER-DEFIBRILLATOR (ICD)  04/2021    NEPHRECTOMY-LAPAROSCOPIC Left 01/12/2016    PERITONEAL CATHETER INSERTION      Permacath insertion  01/12/2017    SALPINGOOPHORECTOMY Right 2016    KJB---DAVINCI    TONSILLECTOMY      TUBAL LIGATION         Review of patient's allergies indicates:   Allergen Reactions    Coreg [carvedilol] Other (See Comments)     Nausea/vomiting    Allopurinol      Other reaction(s): abnormal transaminases       No current facility-administered medications on file prior to encounter.     Current Outpatient Medications on File Prior to Encounter   Medication Sig    acetaminophen (TYLENOL) 500 MG tablet Take 500 mg by mouth every 6 (six) hours as needed for Pain.    apixaban (ELIQUIS) 2.5 mg Tab Take 1 tablet (2.5 mg total) by mouth 2 (two) times daily.    CABOMETYX 60 mg Tab TAKE ONE TABLET BY MOUTH ONCE DAILY AT THE SAME TIME ON AN EMPTY  STOMACH AT LEAST 1 HOUR BEFORE OR 2 HOURS AFTER EATING. AVOID GRAPEFRUIT PRODUCTS    cloNIDine 0.3 mg/24 hr td ptwk (CATAPRES) 0.3 mg/24 hr Place 1 patch onto the skin every 7 days.    epoetin daivd-epbx (RETACRIT INJ) Epoetin david - epbx (Retacrit)    hydrALAZINE (APRESOLINE) 100 MG tablet Take 1 tablet (100 mg total) by mouth every 12 (twelve) hours.    lidocaine-prilocaine (EMLA) cream APPLY ATLEAST 30 MINUTES BEFORE TREATMENT 3 TIMES A WEEK    metoprolol succinate (TOPROL-XL) 100 MG 24 hr tablet Take 1 tablet (100 mg total) by mouth once daily.    mv,Ca,min-folic acid-vit K1 (ONE-A-DAY WOMEN'S 50 PLUS) 400-20 mcg Tab Take 1 tablet by mouth.    ondansetron (ZOFRAN-ODT) 8 MG TbDL Take 1 tablet (8 mg total) by mouth every 12 (twelve) hours as needed.    oxyCODONE-acetaminophen (PERCOCET) 5-325 mg per tablet Take 1 tablet by mouth every 6 (six) hours as needed for Pain.    oxyCODONE-acetaminophen (PERCOCET) 5-325 mg per tablet Take 1 tablet by mouth every 4 (four) hours as needed.    [] predniSONE (DELTASONE) 20 MG tablet Take 2 tablets (40 mg total) by mouth once daily. for 4 days    promethazine (PHENERGAN) 25 MG tablet Take 1 tablet (25 mg total) by mouth every 6 (six) hours as needed for Nausea.    sacubitriL-valsartan (ENTRESTO) 49-51 mg per tablet Take 1 tablet by mouth 2 (two) times daily.    sevelamer carbonate (RENVELA) 800 mg Tab TAKE 2 TABLETS (1,600 MG TOTAL) BY MOUTH 3 (THREE) TIMES DAILY WITH MEALS. (Patient not taking: Reported on 10/26/2022)    sodium chloride 0.9% SolP 100 mL with iron sucrose 100 mg iron/5 mL Soln 100 mg 50 mg.    [DISCONTINUED] amLODIPine (NORVASC) 5 MG tablet TAKE 1 TABLET BY MOUTH EVERY DAY     Family History       Problem Relation (Age of Onset)    Diabetes Father, Maternal Grandfather    Heart disease Sister    Hypertension Mother    Kidney disease Father    No Known Problems Son, Son, Sister, Brother, Maternal Grandmother, Paternal Grandmother, Paternal Grandfather,  Sister, Brother, Maternal Aunt, Maternal Uncle, Paternal Aunt, Paternal Uncle          Tobacco Use    Smoking status: Never    Smokeless tobacco: Never   Substance and Sexual Activity    Alcohol use: No     Comment: . 2 children. works at [employer].    Drug use: No    Sexual activity: Yes     Partners: Male     Review of Systems   Constitutional:  Positive for activity change and appetite change.   HENT:  Negative for congestion and dental problem.    Eyes:  Negative for pain and discharge.   Respiratory:  Negative for apnea and chest tightness.    Cardiovascular:  Negative for chest pain and leg swelling.   Gastrointestinal:  Negative for abdominal distention and abdominal pain.   Endocrine: Negative for cold intolerance and heat intolerance.   Genitourinary:  Negative for difficulty urinating and dyspareunia.   Musculoskeletal:  Positive for arthralgias and myalgias.   Skin:  Negative for color change and pallor.   Allergic/Immunologic: Negative for environmental allergies and food allergies.   Neurological:  Negative for dizziness and facial asymmetry.   Hematological:  Negative for adenopathy. Does not bruise/bleed easily.   Psychiatric/Behavioral:  Negative for agitation.    Objective:     Vital Signs (Most Recent):  Temp: 97.5 °F (36.4 °C) (10/30/22 0800)  Pulse: 79 (10/30/22 1000)  Resp: 20 (10/30/22 1000)  BP: (!) 151/67 (10/30/22 1000)  SpO2: 96 % (10/30/22 1000)   Vital Signs (24h Range):  Temp:  [93.6 °F (34.2 °C)-98.2 °F (36.8 °C)] 97.5 °F (36.4 °C)  Pulse:  [] 79  Resp:  [18-37] 20  SpO2:  [93 %-100 %] 96 %  BP: (100-231)/() 151/67     Weight: 50 kg (110 lb 3.7 oz)  Body mass index is 18.92 kg/m².    Physical Exam  HENT:      Head: Normocephalic.      Nose: Nose normal.      Mouth/Throat:      Mouth: Mucous membranes are dry.   Eyes:      Extraocular Movements: Extraocular movements intact.      Pupils: Pupils are equal, round, and reactive to light.   Cardiovascular:      Rate and  Rhythm: Normal rate and regular rhythm.      Heart sounds: No murmur heard.  Pulmonary:      Effort: No respiratory distress.      Breath sounds: No wheezing.   Abdominal:      General: Abdomen is flat. There is no distension.      Palpations: Abdomen is soft.      Tenderness: There is no abdominal tenderness.   Musculoskeletal:         General: Swelling and tenderness present.      Cervical back: Normal range of motion and neck supple.   Skin:     Coloration: Skin is not jaundiced.      Findings: No bruising.   Neurological:      General: No focal deficit present.      Mental Status: She is alert and oriented to person, place, and time.      Cranial Nerves: No cranial nerve deficit.      Motor: No weakness.   Psychiatric:         Mood and Affect: Mood normal.         Behavior: Behavior normal.         CRANIAL NERVES     CN III, IV, VI   Pupils are equal, round, and reactive to light.     Significant Labs: All pertinent labs within the past 24 hours have been reviewed.  BMP:   Recent Labs   Lab 10/30/22  0744   *      K 4.0   CL 96   CO2 28   BUN 59*   CREATININE 4.8*   CALCIUM 9.1       CBC:   Recent Labs   Lab 10/29/22  0828 10/29/22  2237 10/30/22  0744   WBC 14.59* 11.97 11.26   HGB 9.1* 8.9* 8.8*   HCT 29.8* 26.7* 27.1*    232 206       CMP:   Recent Labs   Lab 10/29/22  1813 10/29/22  2237 10/30/22  0744    139 141   K 3.9 4.0 4.0   CL 95 95 96   CO2 21* 26 28   GLU 97 176* 153*   BUN 29* 51* 59*   CREATININE 2.6* 4.5* 4.8*   CALCIUM 9.9 8.7 9.1   PROT 8.6* 5.8* 5.2*   ALBUMIN 4.0 2.8* 2.4*   BILITOT 3.7* 3.2* 3.0*   ALKPHOS 341* 227* 219*   * 475* 482*   * 173* 181*   ANIONGAP 22* 18* 17*       Lactic Acid:   Recent Labs   Lab 10/29/22  1813 10/30/22  0146 10/30/22  0744   LACTATE 5.3* 2.2 1.5         Significant Imaging: I have reviewed all pertinent imaging results/findings within the past 24 hours.

## 2022-10-30 NOTE — PROGRESS NOTES
[facility] Medicine  Progress Note    Patient Name: Eva Bloom  MRN: [id number]  Patient Class: IP- Inpatient   Admission Date: 10/29/2022  Length of Stay: 1 days  Attending Physician: José Miguel Cristina MD  Primary Care Provider: Sowmya Mccain MD        Subjective:     Principal Problem:Sepsis        HPI:  57 year old female, with pertinent medical history of anemia, kidney cancer, CHF with EF of 35%,, HTN, Pafib on OAC,prediabetes, and myocardiopathy, presents to the ED to evaluate her worsening bilateral leg pain and weakness for 5 days. Patient's  states she was seen for the same symptoms 5 days ago as well as sore throat, chest pain, and nausea. She had labwork done and her results were all negative. Sore throat, chest pain, and nausea have resolved. Patient woke up this morning very weak and fatigued. She was late for dialysis and when she arrived, she was advised to come to the ED. Patient received her dialysis 2 days ago as normal. Patient was able to ambulate alone yesterday with pain, but needs support today.per ER record,  reports she has decreased appetite and is no longer producing urine. No pain medications taken today.  notes that a week ago, patient attended an event and possibly had sick contacts. Patient denies congestion, cough, trauma to legs,or other associated symptoms,patient is hypothermic in ER 93%,has elevated lactic acid,more than 12,has leucocytosis 14,patient is septic,can not gave IVF duo to  ESRD,has been  started on broad spectrum IV Abx.he has severe acidosis C O 2 of 8 and elevated Troponin 2.8,she denies chest apian dn abdominal pain,only complain is leg pain.she has also Hyperkalemia 6.0.      Overview/Hospital Course:  57 year old female, with pertinent medical history of anemia, kidney cancer, CHF with EF of 35%,, HTN, Pafib on OAC,prediabetes, and myocardiopathy, presents to the ED to evaluate her worsening bilateral leg  pain and weakness for 5 days. Patient's  states she was seen for the same symptoms 5 days ago as well as sore throat, chest pain, and nausea. She had labwork done and her results were all negative. Sore throat, chest pain, and nausea have resolved. patient was  hypothermic in ER 93%,improved with bear hug,has elevated lactic acid,more than 12,improved with bicarb drip and HD,has leucocytosis 14,patient is septic,can not giving IVF duo to  ESRD,has been  started on broad spectrum IV Abx.he has severe acidosis C O 2 of 8 ,improved with HD and bicarb drip,and elevated Troponin 2.8,cardiology is consulted,echo. Show EF of 25%,she denies chest pain,  US abdomen show possible acute cholecystitis,on IV Abx ,surgery is consulted.denies abdominal pain.  Has transaminitis with elevated ammonia,hepatitis panel is pending,on lactulose.  Hyperkalemia 6.0.with HD is improved.      Past Medical History:   Diagnosis Date    Anemia     Bronchitis 03/01/2017    Cancer 2016    kidney cancer    CHF (congestive heart failure), NYHA class II, chronic, systolic     CMV (cytomegalovirus) antibody positive     Essential hypertension 9/23/2015    H/O herpes simplex type 2 infection     Herpes simplex type 1 antibody positive     History of kidney cancer     s/p left nephrectomy 1/2016    Hyperparathyroidism, unspecified     Hyperuricemia without signs of inflammatory arthritis and tophaceous disease     Kidney stones     LGSIL (low grade squamous intraepithelial dysplasia)     Myocardiopathy 7/21/2017    Prediabetes     Proteinuria     Renal disorder     Thyroid nodule     Urate nephropathy        Past Surgical History:   Procedure Laterality Date    BREAST CYST EXCISION      COLONOSCOPY N/A 11/12/2015    COLONOSCOPY N/A 3/12/2021    Procedure: COLONOSCOPY;  Surgeon: Brendon Lanier MD;  Location: [facility];  Service: Endoscopy;  Laterality: N/A;  covid test 3/9, labs prior, prep instr mailed -ml     INSERTION OF BIVENTRICULAR IMPLANTABLE CARDIOVERTER-DEFIBRILLATOR (ICD)  2021    NEPHRECTOMY-LAPAROSCOPIC Left 2016    PERITONEAL CATHETER INSERTION      Permacath insertion  2017    SALPINGOOPHORECTOMY Right     KJB---DAVINCI    TONSILLECTOMY      TUBAL LIGATION         Review of patient's allergies indicates:   Allergen Reactions    Coreg [carvedilol] Other (See Comments)     Nausea/vomiting    Allopurinol      Other reaction(s): abnormal transaminases       No current facility-administered medications on file prior to encounter.     Current Outpatient Medications on File Prior to Encounter   Medication Sig    acetaminophen (TYLENOL) 500 MG tablet Take 500 mg by mouth every 6 (six) hours as needed for Pain.    apixaban (ELIQUIS) 2.5 mg Tab Take 1 tablet (2.5 mg total) by mouth 2 (two) times daily.    CABOMETYX 60 mg Tab TAKE ONE TABLET BY MOUTH ONCE DAILY AT THE SAME TIME ON AN EMPTY STOMACH AT LEAST 1 HOUR BEFORE OR 2 HOURS AFTER EATING. AVOID GRAPEFRUIT PRODUCTS    cloNIDine 0.3 mg/24 hr td ptwk (CATAPRES) 0.3 mg/24 hr Place 1 patch onto the skin every 7 days.    epoetin david-epbx (RETACRIT INJ) Epoetin david - epbx (Retacrit)    hydrALAZINE (APRESOLINE) 100 MG tablet Take 1 tablet (100 mg total) by mouth every 12 (twelve) hours.    lidocaine-prilocaine (EMLA) cream APPLY ATLEAST 30 MINUTES BEFORE TREATMENT 3 TIMES A WEEK    metoprolol succinate (TOPROL-XL) 100 MG 24 hr tablet Take 1 tablet (100 mg total) by mouth once daily.    mv,Ca,min-folic acid-vit K1 (ONE-A-DAY WOMEN'S 50 PLUS) 400-20 mcg Tab Take 1 tablet by mouth.    ondansetron (ZOFRAN-ODT) 8 MG TbDL Take 1 tablet (8 mg total) by mouth every 12 (twelve) hours as needed.    oxyCODONE-acetaminophen (PERCOCET) 5-325 mg per tablet Take 1 tablet by mouth every 6 (six) hours as needed for Pain.    oxyCODONE-acetaminophen (PERCOCET) 5-325 mg per tablet Take 1 tablet by mouth every 4 (four) hours as needed.    []  predniSONE (DELTASONE) 20 MG tablet Take 2 tablets (40 mg total) by mouth once daily. for 4 days    promethazine (PHENERGAN) 25 MG tablet Take 1 tablet (25 mg total) by mouth every 6 (six) hours as needed for Nausea.    sacubitriL-valsartan (ENTRESTO) 49-51 mg per tablet Take 1 tablet by mouth 2 (two) times daily.    sevelamer carbonate (RENVELA) 800 mg Tab TAKE 2 TABLETS (1,600 MG TOTAL) BY MOUTH 3 (THREE) TIMES DAILY WITH MEALS. (Patient not taking: Reported on 10/26/2022)    sodium chloride 0.9% SolP 100 mL with iron sucrose 100 mg iron/5 mL Soln 100 mg 50 mg.    [DISCONTINUED] amLODIPine (NORVASC) 5 MG tablet TAKE 1 TABLET BY MOUTH EVERY DAY     Family History       Problem Relation (Age of Onset)    Diabetes Father, Maternal Grandfather    Heart disease Sister    Hypertension Mother    Kidney disease Father    No Known Problems Son, Son, Sister, Brother, Maternal Grandmother, Paternal Grandmother, Paternal Grandfather, Sister, Brother, Maternal Aunt, Maternal Uncle, Paternal Aunt, Paternal Uncle          Tobacco Use    Smoking status: Never    Smokeless tobacco: Never   Substance and Sexual Activity    Alcohol use: No     Comment: . 2 children. works at [employer].    Drug use: No    Sexual activity: Yes     Partners: Male     Review of Systems   Constitutional:  Positive for activity change and appetite change.   HENT:  Negative for congestion and dental problem.    Eyes:  Negative for pain and discharge.   Respiratory:  Negative for apnea and chest tightness.    Cardiovascular:  Negative for chest pain and leg swelling.   Gastrointestinal:  Negative for abdominal distention and abdominal pain.   Endocrine: Negative for cold intolerance and heat intolerance.   Genitourinary:  Negative for difficulty urinating and dyspareunia.   Musculoskeletal:  Positive for arthralgias and myalgias.   Skin:  Negative for color change and pallor.   Allergic/Immunologic: Negative for environmental allergies and  food allergies.   Neurological:  Negative for dizziness and facial asymmetry.   Hematological:  Negative for adenopathy. Does not bruise/bleed easily.   Psychiatric/Behavioral:  Negative for agitation.    Objective:     Vital Signs (Most Recent):  Temp: 97.5 °F (36.4 °C) (10/30/22 0800)  Pulse: 79 (10/30/22 1000)  Resp: 20 (10/30/22 1000)  BP: (!) 151/67 (10/30/22 1000)  SpO2: 96 % (10/30/22 1000)   Vital Signs (24h Range):  Temp:  [93.6 °F (34.2 °C)-98.2 °F (36.8 °C)] 97.5 °F (36.4 °C)  Pulse:  [] 79  Resp:  [18-37] 20  SpO2:  [93 %-100 %] 96 %  BP: (100-231)/() 151/67     Weight: 50 kg (110 lb 3.7 oz)  Body mass index is 18.92 kg/m².    Physical Exam  HENT:      Head: Normocephalic.      Nose: Nose normal.      Mouth/Throat:      Mouth: Mucous membranes are dry.   Eyes:      Extraocular Movements: Extraocular movements intact.      Pupils: Pupils are equal, round, and reactive to light.   Cardiovascular:      Rate and Rhythm: Normal rate and regular rhythm.      Heart sounds: No murmur heard.  Pulmonary:      Effort: No respiratory distress.      Breath sounds: No wheezing.   Abdominal:      General: Abdomen is flat. There is no distension.      Palpations: Abdomen is soft.      Tenderness: There is no abdominal tenderness.   Musculoskeletal:         General: Swelling and tenderness present.      Cervical back: Normal range of motion and neck supple.   Skin:     Coloration: Skin is not jaundiced.      Findings: No bruising.   Neurological:      General: No focal deficit present.      Mental Status: She is alert and oriented to person, place, and time.      Cranial Nerves: No cranial nerve deficit.      Motor: No weakness.   Psychiatric:         Mood and Affect: Mood normal.         Behavior: Behavior normal.         CRANIAL NERVES     CN III, IV, VI   Pupils are equal, round, and reactive to light.     Significant Labs: All pertinent labs within the past 24 hours have been reviewed.  BMP:   Recent Labs    Lab 10/30/22  0744   *      K 4.0   CL 96   CO2 28   BUN 59*   CREATININE 4.8*   CALCIUM 9.1       CBC:   Recent Labs   Lab 10/29/22  0828 10/29/22  2237 10/30/22  0744   WBC 14.59* 11.97 11.26   HGB 9.1* 8.9* 8.8*   HCT 29.8* 26.7* 27.1*    232 206       CMP:   Recent Labs   Lab 10/29/22  1813 10/29/22  2237 10/30/22  0744    139 141   K 3.9 4.0 4.0   CL 95 95 96   CO2 21* 26 28   GLU 97 176* 153*   BUN 29* 51* 59*   CREATININE 2.6* 4.5* 4.8*   CALCIUM 9.9 8.7 9.1   PROT 8.6* 5.8* 5.2*   ALBUMIN 4.0 2.8* 2.4*   BILITOT 3.7* 3.2* 3.0*   ALKPHOS 341* 227* 219*   * 475* 482*   * 173* 181*   ANIONGAP 22* 18* 17*       Lactic Acid:   Recent Labs   Lab 10/29/22  1813 10/30/22  0146 10/30/22  0744   LACTATE 5.3* 2.2 1.5         Significant Imaging: I have reviewed all pertinent imaging results/findings within the past 24 hours.      Assessment/Plan:      * Sepsis  This patient does have evidence of infective focus  My overall impression is sepsis. Vital signs were reviewed and noted in progress note.  Antibiotics given-   Antibiotics (From admission, onward)    Start     Stop Route Frequency Ordered    10/30/22 2100  piperacillin-tazobactam 4.5 g in dextrose 5 % 100 mL IVPB (ready to mix system)         -- IV Every 12 hours (non-standard times) 10/30/22 1012    10/29/22 2100  rifAXIMin tablet 550 mg         -- Oral 2 times daily 10/29/22 1942    10/29/22 1245  mupirocin 2 % ointment         11/03 0859 Nasl 2 times daily 10/29/22 1140        Cultures were taken-   Microbiology Results (last 7 days)     Procedure Component Value Units Date/Time    Blood Culture #1 **CANNOT BE ORDERED STAT** [[id number]] Collected: 10/29/22 0941    Order Status: Completed Specimen: Blood from Peripheral, Hand, Right Updated: 10/29/22 2112     Blood Culture, Routine No Growth to date    Blood Culture #2 **CANNOT BE ORDERED STAT** [[id number]] Collected: 10/29/22 0915    Order Status: Completed Specimen:  Blood from Peripheral, Hand, Right Updated: 10/29/22 2112     Blood Culture, Routine No Growth to date        Latest lactate reviewed, they are-  Recent Labs   Lab 10/29/22  1813 10/30/22  0146 10/30/22  0744   LACTATE 5.3* 2.2 1.5       Organ dysfunction indicated by Acute liver injury  Source- possible UTI,bactremia.    Source control Achieved by- IV Abx.    US abdomen show possible acute cholecystitis,on IV Abx ,surgery is consulted.denies abdominal pain.          Lactic acidosis  Denies abdominal  Pain,duo to sepsis.will monitor.  Improved with HD and bicarb drip.      Transaminitis  Suspect to liver congestion,will monitor,hepatitis pane l is pending,on lactulose for elevated ammonia.      Elevated troponin  Denies chest pain,will monitor,checked Echo,has EF of 25%,cardiology is on case,.she denies chest pain.      PAF (paroxysmal atrial fibrillation)  Patient with Persistent (7 days or more) atrial fibrillation which is controlled currently with Beta Blocker. Patient is currently in sinus rhythm.[id number] Score: 1. HASBLED Score: . Anticoagulation indicated. Anticoagulation done with eliquis.        ICD (implantable cardioverter-defibrillator) in place - SubQ  Will monitor.      Chronic kidney disease-mineral and bone disorder  On HD.      Anemia in ESRD (end-stage renal disease)  Will monitor.      Hyperkalemia  Started on lokelma.improved.      Chronic combined systolic and diastolic congestive heart failure  With EF of 35%,fludi removal with HD.new Echo. Show EF of 25%.      Nonrheumatic mitral valve regurgitation  Per record,      ESRD (end stage renal disease) on dialysis  On  HD,nephrioolgy is consulted.      Pulmonary hypertension  per record.      Cardiomyopathy, nonischemic  Check echo.show EF of 25%,      S/p nephrectomy left  Per record.      History of kidney cancer  per record.      Essential hypertension  Resumed home medications..        VTE Risk Mitigation (From admission, onward)          Ordered     apixaban tablet 2.5 mg  2 times daily         10/29/22 1001                Discharge Planning   MAVIS:      Code Status: Prior   Is the patient medically ready for discharge?:     Reason for patient still in hospital (select all that apply): Patient trending condition               Critical care time spent on the evaluation and treatment of severe organ dysfunction, review of pertinent labs and imaging studies, discussions with consulting providers and discussions with patient/family:  Over 45  minutes.      José Miguel Cristina MD  Department of Hospital Medicine   [facility] [place]

## 2022-10-30 NOTE — ASSESSMENT & PLAN NOTE
Possibly due to gallstones or possibly intrinsic liver disease.    Will get HIDA scan to evaluate.    Patient unable MRCP due to having an AICD in place.

## 2022-10-30 NOTE — NURSING
Patient resting in bed - connected to cardiac monitoring - family at the bedside - patient appears weak and frail - asking to eat - pending surgery consult - MD notified - labs drawn and resulted - patient on room air - pupils are equal and reactive -bicarb infusing via central line - peripheral iv noted - left arm with fistula with thrill and bruit - bilateral arms with noted edema- right more prominent than the left - elevated on a pillow- lungs are diminished - abdomen is soft and does not appear tender- anuric - sacral foam pulled back to assess skin - non blanchable - dark colored area - foam replaced and turned on her side with assistance of pillow- bilateral lower legs swollen peripheral pulses present -nephrology at the bedside - will continue to monitor

## 2022-10-30 NOTE — EICU
"USG with acute cholecystitis- Dr Barker contacting surgery  HTN- ? Cause- give low dose labetalol- has aida on metoprolol at as listed on home meds  BP (!) 217/89   Pulse 101   Temp 97.8 °F (36.6 °C) (Oral)   Resp (!) 33   Ht 5' 4" (1.626 m)   Wt 52.2 kg (115 lb)   LMP 10/24/2016   SpO2 99%   Breastfeeding No   BMI 19.74 kg/m²   Check LA, PCT  Updated RN  "

## 2022-10-30 NOTE — HOSPITAL COURSE
57 year old female with ESRD presents to the ED to evaluate her worsening bilateral leg pain and weakness for 5 days.  Patient was hypothermic in ER with lactic acidosis.  Started on Bicarb drip and underwent HD.  Started on broad spectrum ABx's.  Elevated Troponin and Cardiology consulted.  Echo shows EF of 25%.  Denies any chest pain and no plans for intervention at this time.  US abdomen shows possible acute cholecystitis.  Surgery consulted.  HIDA unable to eval gallbladder due to poor hepatic clearance. No indication for cholecystostomy drain as pain has improved per Surgery.  PT/OT consulted.  Recommending Rehab.  SW consulted.  All cultures negative with no obvious source of infection and ABx's stopped.  Patient and  do not want Rehab and want to go home with HH.  She has remained afebrile and hemodynamically stable.  Elevated LFT's on admit have gradually improved.  Patient will be discharged home with HH and DME.  She will follow up with PCP.

## 2022-10-30 NOTE — PLAN OF CARE
Pt free from fall/injury, remains on bedrest. Lethargic. Lactic down to 2.3. Abdominal/BLE u/s completed.

## 2022-10-30 NOTE — PLAN OF CARE
Patient asleep when assessment attempted.  CM called Otis at [phone].  No answer detailed message left requesting call back.  Assessment completed from previous record.    [facility]  Initial Discharge Assessment       Primary Care Provider: Sowmya Mccain MD    Admission Diagnosis: Weakness [R53.1]  Sepsis [A41.9]    Admission Date: 10/29/2022  Expected Discharge Date:     Discharge Barriers Identified: None    Payor: BLUE [employer] BLUE SHIELD / Plan: BCBS ALL OUT OF STATE / Product Type: PPO /     Extended Emergency Contact Information  Primary Emergency Contact: Otis lBoom  Address: [address] [place] [address] [place]  Home Phone: [phone]  Mobile Phone: [phone]  Relation: Spouse    Discharge Plan A: Home Health  Discharge Plan B: Home with family      [id number] - [place] - [address]  [address]  [facility] [address]  Phone: [phone] Fax: [phone]    [facility] [place]  [place] - [address]  [address]  [place] [address]  Phone: [phone] Fax: [phone]    [facility]  [address] [facility] [address]  Phone: [phone] Fax: [phone]      Initial Assessment (most recent)       Adult Discharge Assessment - 10/30/22 1819          Discharge Assessment    Assessment Type Discharge Planning Assessment     Confirmed/corrected address, phone number and insurance Yes     Confirmed Demographics Correct on Facesheet     Source of Information health record     If unable to respond/provide information was family/caregiver contacted? Yes     Contact Name/Number   [phone]     When was your last doctors appointment? 08/01/22     Communicated MAVIS with patient/caregiver --     Reason For Admission Lethargy     Lives With spouse     Do you expect to return to your current living situation? Yes     Do you have help at home or  someone to help you manage your care at home? Yes     Prior to hospitilization cognitive status: Alert/Oriented     Current cognitive status: Alert/Oriented     Walking or Climbing Stairs Difficulty none     Dressing/Bathing Difficulty none     Home Accessibility wheelchair accessible     Home Layout Able to live on 1st floor     Equipment Currently Used at Home none     Readmission within 30 days? Yes     Patient currently being followed by outpatient case management? No     Do you currently have service(s) that help you manage your care at home? No     Do you take prescription medications? Yes     Do you have prescription coverage? Yes     Coverage BCBS     Do you have any problems affording any of your prescribed medications? No     Is the patient taking medications as prescribed? yes     Who is going to help you get home at discharge?      How do you get to doctors appointments? family or friend will provide     Are you on dialysis? Yes     Do you take coumadin? No     Discharge Plan A Home Health     Discharge Plan B Home with family     DME Needed Upon Discharge  --   tbd    Discharge Barriers Identified None        Relationship/Environment    Name(s) of Who Lives With Patient Otis Bloom

## 2022-10-30 NOTE — EICU
"HTN post HD  BP (!) 208/93   Pulse 90   Temp 97.8 °F (36.6 °C) (Oral)   Resp (!) 30   Ht 5' 4" (1.626 m)   Wt 52.2 kg (115 lb)   LMP 10/24/2016   SpO2 98%   Breastfeeding No   BMI 19.74 kg/m²   Intake/Output - Last 3 Shifts         10/28 0700  10/29 0659 10/29 0700  10/30 0659    I.V. (mL/kg)  407.2 (7.8)    Other  500    IV Piggyback  341.3    Total Intake(mL/kg)  1248.5 (23.9)    Other  1500    Total Output  1500    Net  -251.5                Drowsy on video  Unable to get ABG per RN due to access issues  On hydralazine but BP still high      Off clonidine- was on per home med list  Has not started planned Entresto yet     Plan:    - Start Entresto-adjust dose per formulary availability- d/w pharmacist  -Hold off clonidine due to drowsiness and planned wean  -Increase hydralazine to 20 mg q4h prn   -Labs with high ammonia and high AST, ALT, Bili- get stat US RUQ to assess for GB disease - unsure if present  -LA high but lower- follow  -High Ammonia- give lactulose and start rifaximin  D/w RN ; d/w Dr aBrker who will also followup     "

## 2022-10-30 NOTE — ASSESSMENT & PLAN NOTE
This patient does have evidence of infective focus  My overall impression is sepsis. Vital signs were reviewed and noted in progress note.  Antibiotics given-   Antibiotics (From admission, onward)    Start     Stop Route Frequency Ordered    10/30/22 2100  piperacillin-tazobactam 4.5 g in dextrose 5 % 100 mL IVPB (ready to mix system)         -- IV Every 12 hours (non-standard times) 10/30/22 1012    10/29/22 2100  rifAXIMin tablet 550 mg         -- Oral 2 times daily 10/29/22 1942    10/29/22 1245  mupirocin 2 % ointment         11/03 0859 Nasl 2 times daily 10/29/22 1140        Cultures were taken-   Microbiology Results (last 7 days)     Procedure Component Value Units Date/Time    Blood Culture #1 **CANNOT BE ORDERED STAT** [[id number]] Collected: 10/29/22 0941    Order Status: Completed Specimen: Blood from Peripheral, Hand, Right Updated: 10/29/22 2112     Blood Culture, Routine No Growth to date    Blood Culture #2 **CANNOT BE ORDERED STAT** [[id number]] Collected: 10/29/22 0915    Order Status: Completed Specimen: Blood from Peripheral, Hand, Right Updated: 10/29/22 2112     Blood Culture, Routine No Growth to date        Latest lactate reviewed, they are-  Recent Labs   Lab 10/29/22  1813 10/30/22  0146 10/30/22  0744   LACTATE 5.3* 2.2 1.5       Organ dysfunction indicated by Acute liver injury  Source- possible UTI,bactremia.    Source control Achieved by- IV Abx.    US abdomen show possible acute cholecystitis,on IV Abx ,surgery is consulted.denies abdominal pain.

## 2022-10-30 NOTE — SUBJECTIVE & OBJECTIVE
No current facility-administered medications on file prior to encounter.     Current Outpatient Medications on File Prior to Encounter   Medication Sig    acetaminophen (TYLENOL) 500 MG tablet Take 500 mg by mouth every 6 (six) hours as needed for Pain.    apixaban (ELIQUIS) 2.5 mg Tab Take 1 tablet (2.5 mg total) by mouth 2 (two) times daily.    CABOMETYX 60 mg Tab TAKE ONE TABLET BY MOUTH ONCE DAILY AT THE SAME TIME ON AN EMPTY STOMACH AT LEAST 1 HOUR BEFORE OR 2 HOURS AFTER EATING. AVOID GRAPEFRUIT PRODUCTS    cloNIDine 0.3 mg/24 hr td ptwk (CATAPRES) 0.3 mg/24 hr Place 1 patch onto the skin every 7 days.    epoetin david-epbx (RETACRIT INJ) Epoetin david - epbx (Retacrit)    hydrALAZINE (APRESOLINE) 100 MG tablet Take 1 tablet (100 mg total) by mouth every 12 (twelve) hours.    lidocaine-prilocaine (EMLA) cream APPLY ATLEAST 30 MINUTES BEFORE TREATMENT 3 TIMES A WEEK    metoprolol succinate (TOPROL-XL) 100 MG 24 hr tablet Take 1 tablet (100 mg total) by mouth once daily.    mv,Ca,min-folic acid-vit K1 (ONE-A-DAY WOMEN'S 50 PLUS) 400-20 mcg Tab Take 1 tablet by mouth.    ondansetron (ZOFRAN-ODT) 8 MG TbDL Take 1 tablet (8 mg total) by mouth every 12 (twelve) hours as needed.    oxyCODONE-acetaminophen (PERCOCET) 5-325 mg per tablet Take 1 tablet by mouth every 6 (six) hours as needed for Pain.    oxyCODONE-acetaminophen (PERCOCET) 5-325 mg per tablet Take 1 tablet by mouth every 4 (four) hours as needed.    [] predniSONE (DELTASONE) 20 MG tablet Take 2 tablets (40 mg total) by mouth once daily. for 4 days    promethazine (PHENERGAN) 25 MG tablet Take 1 tablet (25 mg total) by mouth every 6 (six) hours as needed for Nausea.    sacubitriL-valsartan (ENTRESTO) 49-51 mg per tablet Take 1 tablet by mouth 2 (two) times daily.    sevelamer carbonate (RENVELA) 800 mg Tab TAKE 2 TABLETS (1,600 MG TOTAL) BY MOUTH 3 (THREE) TIMES DAILY WITH MEALS. (Patient not taking: Reported on 10/26/2022)    sodium chloride 0.9%  SolP 100 mL with iron sucrose 100 mg iron/5 mL Soln 100 mg 50 mg.    [DISCONTINUED] amLODIPine (NORVASC) 5 MG tablet TAKE 1 TABLET BY MOUTH EVERY DAY       Review of patient's allergies indicates:   Allergen Reactions    Coreg [carvedilol] Other (See Comments)     Nausea/vomiting    Allopurinol      Other reaction(s): abnormal transaminases       Past Medical History:   Diagnosis Date    Anemia     Bronchitis 03/01/2017    Cancer 2016    kidney cancer    CHF (congestive heart failure), NYHA class II, chronic, systolic     CMV (cytomegalovirus) antibody positive     Essential hypertension 9/23/2015    H/O herpes simplex type 2 infection     Herpes simplex type 1 antibody positive     History of kidney cancer     s/p left nephrectomy 1/2016    Hyperparathyroidism, unspecified     Hyperuricemia without signs of inflammatory arthritis and tophaceous disease     Kidney stones     LGSIL (low grade squamous intraepithelial dysplasia)     Myocardiopathy 7/21/2017    Prediabetes     Proteinuria     Renal disorder     Thyroid nodule     Urate nephropathy      Past Surgical History:   Procedure Laterality Date    BREAST CYST EXCISION      COLONOSCOPY N/A 11/12/2015    COLONOSCOPY N/A 3/12/2021    Procedure: COLONOSCOPY;  Surgeon: Brendon Lanier MD;  Location: [facility];  Service: Endoscopy;  Laterality: N/A;  covid test 3/9, labs prior, prep instr mailed -ml    INSERTION OF BIVENTRICULAR IMPLANTABLE CARDIOVERTER-DEFIBRILLATOR (ICD)  04/2021    NEPHRECTOMY-LAPAROSCOPIC Left 01/12/2016    PERITONEAL CATHETER INSERTION      Permacath insertion  01/12/2017    SALPINGOOPHORECTOMY Right 2016    KJB---DAVINCI    TONSILLECTOMY      TUBAL LIGATION       Family History       Problem Relation (Age of Onset)    Diabetes Father, Maternal Grandfather    Heart disease Sister    Hypertension Mother    Kidney disease Father    No Known Problems Son, Son, Sister, Brother, Maternal Grandmother, Paternal Grandmother, Paternal Grandfather,  Sister, Brother, Maternal Aunt, Maternal Uncle, Paternal Aunt, Paternal Uncle          Tobacco Use    Smoking status: Never    Smokeless tobacco: Never   Substance and Sexual Activity    Alcohol use: No     Comment: . 2 children. works at [employer].    Drug use: No    Sexual activity: Yes     Partners: Male     Review of Systems   Constitutional:  Negative for appetite change, fatigue, fever and unexpected weight change.   HENT:  Negative for sore throat and trouble swallowing.    Eyes: Negative.    Respiratory:  Negative for cough, shortness of breath and wheezing.    Cardiovascular:  Negative for chest pain and leg swelling.   Gastrointestinal:  Positive for abdominal pain (Mild to moderate in epigastric area). Negative for abdominal distention, blood in stool, constipation, diarrhea, nausea and vomiting.   Endocrine: Negative.    Genitourinary: Negative.    Musculoskeletal:  Negative for back pain.   Skin: Negative.  Negative for rash.   Allergic/Immunologic: Negative.    Neurological: Negative.    Hematological: Negative.    Psychiatric/Behavioral:  Negative for confusion.    Objective:     Vital Signs (Most Recent):  Temp: 97.5 °F (36.4 °C) (10/30/22 1100)  Pulse: 70 (10/30/22 1230)  Resp: 20 (10/30/22 1230)  BP: 136/67 (10/30/22 1230)  SpO2: 96 % (10/30/22 1230) Vital Signs (24h Range):  Temp:  [97.5 °F (36.4 °C)-98.2 °F (36.8 °C)] 97.5 °F (36.4 °C)  Pulse:  [] 70  Resp:  [18-37] 20  SpO2:  [93 %-100 %] 96 %  BP: (100-231)/() 136/67     Weight: 50 kg (110 lb 3.7 oz)  Body mass index is 18.92 kg/m².    Physical Exam  Vitals and nursing note reviewed.   Constitutional:       Appearance: She is well-developed.   HENT:      Head: Normocephalic and atraumatic.   Cardiovascular:      Rate and Rhythm: Normal rate.      Heart sounds: Normal heart sounds.   Pulmonary:      Effort: Pulmonary effort is normal.   Abdominal:      General: Bowel sounds are normal. There is no distension.      Palpations:  Abdomen is soft.      Tenderness: There is abdominal tenderness (mild in epigastric area).   Musculoskeletal:         General: Normal range of motion.      Cervical back: Normal range of motion.   Skin:     General: Skin is warm and dry.      Capillary Refill: Capillary refill takes less than 2 seconds.   Neurological:      Mental Status: She is alert and oriented to person, place, and time.   Psychiatric:         Behavior: Behavior normal.       Significant Labs:  I have reviewed all pertinent lab results within the past 24 hours.  CBC:   Recent Labs   Lab 10/30/22  0744   WBC 11.26   RBC 2.72*   HGB 8.8*   HCT 27.1*      *   MCH 32.4*   MCHC 32.5     CMP:   Recent Labs   Lab 10/30/22  0744   *   CALCIUM 9.1   ALBUMIN 2.4*   PROT 5.2*      K 4.0   CO2 28   CL 96   BUN 59*   CREATININE 4.8*   ALKPHOS 219*   *   *   BILITOT 3.0*       Significant Diagnostics:  Ultrasound    Gallstones.  Thick-walled gallbladder.  Positive sonographic Sloan sign.  Abdominal ascites.  The possibility of acute cholecystitis should be considered.     1.3 x 0.8 x 1.3 cm left hepatic lesion with minimal peripheral blood flow.  Recommend correlation with MRI of the abdomen from 12/28/2020

## 2022-10-30 NOTE — NURSING
Patient resting in bed - cardiac monitoring - b/p within parameters - more alert and able to take po meds and clear liquids- still sleepy -  at the bedside

## 2022-10-30 NOTE — ASSESSMENT & PLAN NOTE
Denies chest pain,will monitor,checked Echo,has EF of 25%,cardiology is on case,.she denies chest pain.

## 2022-10-30 NOTE — NURSING
Pt BP still elevated with SBP > 200, requesting further treatment, pt also requesting something for sleep. Awaiting orders

## 2022-10-30 NOTE — PROGRESS NOTES
[facility]  Nephrology  Progress Note     Patient Name: Eva Bloom  MRN: [id number]  Admission Date: 10/29/2022  Hospital Length of Stay: 1 days  Attending Provider: José Miguel Cristina MD   Primary Care Physician: Sowmya Mccain MD  Principal Problem: Sepsis    Subjective:     HPI: No notes on file    Interval History: Seen at the bedside no event overnight       Review of patient's allergies indicates:   Allergen Reactions    Coreg [carvedilol] Other (See Comments)     Nausea/vomiting    Allopurinol      Other reaction(s): abnormal transaminases      Current Facility-Administered Medications   Medication Frequency    0.9%  NaCl infusion PRN    0.9%  NaCl infusion Once    acetaminophen tablet 650 mg Q6H PRN    amLODIPine tablet 10 mg Daily    dextrose 10% bolus 250 mL Once    hydrALAZINE injection 20 mg Q4H PRN    hydrALAZINE tablet 100 mg Q8H    labetaloL injection 10 mg Q6H PRN    lactulose 20 gram/30 mL solution Soln 10 g TID    metoprolol succinate (TOPROL-XL) 24 hr tablet 100 mg Daily    mupirocin 2 % ointment BID    piperacillin-tazobactam 4.5 g in dextrose 5 % 100 mL IVPB (ready to mix system) Q12H    rifAXIMin tablet 550 mg BID    sacubitriL-valsartan 24-26 mg per tablet 2 tablet BID    sodium bicarbonate 150 mEq in dextrose 5 % 1,000 mL infusion Continuous    sodium chloride 0.9% bolus 250 mL PRN    sodium zirconium cyclosilicate packet 10 g Daily       Objective:     Vital Signs (Most Recent):  Temp: 97.5 °F (36.4 °C) (10/30/22 0800)  Pulse: 75 (10/30/22 1100)  Resp: (!) 22 (10/30/22 1100)  BP: 134/67 (10/30/22 1100)  SpO2: 96 % (10/30/22 1100)  O2 Device (Oxygen Therapy): room air (10/30/22 1100)   Vital Signs (24h Range):  Temp:  [97.5 °F (36.4 °C)-98.2 °F (36.8 °C)] 97.5 °F (36.4 °C)  Pulse:  [] 75  Resp:  [18-37] 22  SpO2:  [93 %-100 %] 96 %  BP: (100-231)/() 134/67   Weight: 50 kg (110 lb 3.7 oz) (10/30/22 0530)  Body mass index is 18.92 kg/m².  Body surface  area is 1.5 meters squared.      Intake/Output Summary (Last 24 hours) at 10/30/2022 1201  Last data filed at 10/30/2022 0705  Gross per 24 hour   Intake 2165.02 ml   Output 1500 ml   Net 665.02 ml     I/O last 3 completed shifts:  In: 2133.8 [P.O.:85; I.V.:1115.3; Other:500; IV Piggyback:433.5]  Out: 1500 [Other:1500]  Net IO Since Admission: 890.02 mL [10/30/22 1201]     Physical Exam  Constitutional:       Appearance: She is ill-appearing.   Cardiovascular:      Rate and Rhythm: Normal rate.      Pulses: Normal pulses.   Pulmonary:      Effort: Pulmonary effort is normal.   Abdominal:      General: Abdomen is flat.   Skin:     General: Skin is warm.   Neurological:      Mental Status: She is alert and oriented to person, place, and time.   Psychiatric:         Mood and Affect: Mood normal.       Recent Labs   Lab 10/29/22  0828 10/29/22  2237 10/30/22  0744   WBC 14.59* 11.97 11.26   HGB 9.1* 8.9* 8.8*   HCT 29.8* 26.7* 27.1*    232 206   MONO 4.8  0.7 4.2  0.5 3.6*  0.4      Recent Labs   Lab 10/29/22  1813 10/29/22  2237 10/30/22  0744    139 141   K 3.9 4.0 4.0   CL 95 95 96   CO2 21* 26 28   BUN 29* 51* 59*   CREATININE 2.6* 4.5* 4.8*   CALCIUM 9.9 8.7 9.1   PROT 8.6* 5.8* 5.2*   BILITOT 3.7* 3.2* 3.0*   ALKPHOS 341* 227* 219*   * 173* 181*   * 475* 482*      Recent Labs     10/29/22  2347   PH 7.559*   PCO2 34.8*   PO2 53   HCO3 31.1*   POCSATURATED 91*   BE 8       Assessment/Plan:       Sepsis    Essential hypertension    History of kidney cancer    S/p nephrectomy left    Cardiomyopathy, nonischemic    Pulmonary hypertension    ESRD (end stage renal disease) on dialysis    Chronic combined systolic and diastolic congestive heart failure    Anemia in ESRD (end-stage renal disease)    Chronic kidney disease-mineral and bone disorder    ICD (implantable cardioverter-defibrillator) in place - SubQ    PAF (paroxysmal atrial fibrillation)    Lactic acidosis        - last HD was on  Thursday , will give the patient 1 session of intermittent hemodialysis yesterday 2 hours for metabolic clearance and volume management  Labs reviewed no urgent need for intermittent hemodialysis for today  Will continue monitor patient on the daily basis and assess the need for intermittent hemodialysis  - dose meds for HD   - nephro caps  - strict I/O, daily weights  - please avoid gadolinium, fleets, phos-based laxatives, NSAIDs      Anemia of Chronic Kidney Disease   - Hb > 7 gm/dL  - Will resume LUMA as outpatient   - Will request iron studies to assess further needs of supplementation     Mineral Bone Disease in CKD   - Renal Function Panel Daily for electrolytes monitoring  - Already on binders as outpatient Please continue       HTN   - will continue to monitor. Goal for BP is <140 mmHg SBP and BDP <90 mmHg, maintain MAP > 65.    Nutrition   - Renal Diet          Thank you for your consult. I will follow-up with patient. Please contact us if you have any additional questions.    Joy Schneider MD  Nephrology

## 2022-10-30 NOTE — CONSULTS
[facility] Intensive Care  General Surgery  Progress Note     Subjective:      History of Present Illness:  57 year old female, with pertinent medical history of anemia, kidney cancer, CHF with EF of 35%,, HTN, Pafib on OAC,prediabetes, and myocardiopathy, presents to the ED to evaluate her worsening bilateral leg pain and weakness for 5 days. Patient's  states she was seen for the same symptoms 5 days ago as well as sore throat, chest pain, and nausea. She had labwork done and her results were all negative. Sore throat, chest pain, and nausea have resolved. Patient woke up this morning very weak and fatigued. She was late for dialysis and when she arrived, she was advised to come to the ED. Patient received her dialysis 2 days ago as normal. Patient was able to ambulate alone yesterday with pain, but needs support today.per ER record,  reports she has decreased appetite and is no longer producing urine. No pain medications taken today.  notes that a week ago, patient attended an event and possibly had sick contacts.         Post-Op Info:  * No surgery found *         No current facility-administered medications on file prior to encounter.           Current Outpatient Medications on File Prior to Encounter   Medication Sig    acetaminophen (TYLENOL) 500 MG tablet Take 500 mg by mouth every 6 (six) hours as needed for Pain.    apixaban (ELIQUIS) 2.5 mg Tab Take 1 tablet (2.5 mg total) by mouth 2 (two) times daily.    CABOMETYX 60 mg Tab TAKE ONE TABLET BY MOUTH ONCE DAILY AT THE SAME TIME ON AN EMPTY STOMACH AT LEAST 1 HOUR BEFORE OR 2 HOURS AFTER EATING. AVOID GRAPEFRUIT PRODUCTS    cloNIDine 0.3 mg/24 hr td ptwk (CATAPRES) 0.3 mg/24 hr Place 1 patch onto the skin every 7 days.    epoetin david-epbx (RETACRIT INJ) Epoetin david - epbx (Retacrit)    hydrALAZINE (APRESOLINE) 100 MG tablet Take 1 tablet (100 mg total) by mouth every 12 (twelve) hours.    lidocaine-prilocaine (EMLA) cream APPLY  ATLEAST 30 MINUTES BEFORE TREATMENT 3 TIMES A WEEK    metoprolol succinate (TOPROL-XL) 100 MG 24 hr tablet Take 1 tablet (100 mg total) by mouth once daily.    mv,Ca,min-folic acid-vit K1 (ONE-A-DAY WOMEN'S 50 PLUS) 400-20 mcg Tab Take 1 tablet by mouth.    ondansetron (ZOFRAN-ODT) 8 MG TbDL Take 1 tablet (8 mg total) by mouth every 12 (twelve) hours as needed.    oxyCODONE-acetaminophen (PERCOCET) 5-325 mg per tablet Take 1 tablet by mouth every 6 (six) hours as needed for Pain.    oxyCODONE-acetaminophen (PERCOCET) 5-325 mg per tablet Take 1 tablet by mouth every 4 (four) hours as needed.    [] predniSONE (DELTASONE) 20 MG tablet Take 2 tablets (40 mg total) by mouth once daily. for 4 days    promethazine (PHENERGAN) 25 MG tablet Take 1 tablet (25 mg total) by mouth every 6 (six) hours as needed for Nausea.    sacubitriL-valsartan (ENTRESTO) 49-51 mg per tablet Take 1 tablet by mouth 2 (two) times daily.    sevelamer carbonate (RENVELA) 800 mg Tab TAKE 2 TABLETS (1,600 MG TOTAL) BY MOUTH 3 (THREE) TIMES DAILY WITH MEALS. (Patient not taking: Reported on 10/26/2022)    sodium chloride 0.9% SolP 100 mL with iron sucrose 100 mg iron/5 mL Soln 100 mg 50 mg.    [DISCONTINUED] amLODIPine (NORVASC) 5 MG tablet TAKE 1 TABLET BY MOUTH EVERY DAY               Review of patient's allergies indicates:   Allergen Reactions    Coreg [carvedilol] Other (See Comments)       Nausea/vomiting    Allopurinol         Other reaction(s): abnormal transaminases              Past Medical History:   Diagnosis Date    Anemia      Bronchitis 2017    Cancer 2016     kidney cancer    CHF (congestive heart failure), NYHA class II, chronic, systolic      CMV (cytomegalovirus) antibody positive      Essential hypertension 2015    H/O herpes simplex type 2 infection      Herpes simplex type 1 antibody positive      History of kidney cancer       s/p left nephrectomy 2016    Hyperparathyroidism, unspecified      Hyperuricemia  without signs of inflammatory arthritis and tophaceous disease      Kidney stones      LGSIL (low grade squamous intraepithelial dysplasia)      Myocardiopathy 7/21/2017    Prediabetes      Proteinuria      Renal disorder      Thyroid nodule      Urate nephropathy              Past Surgical History:   Procedure Laterality Date    BREAST CYST EXCISION        COLONOSCOPY N/A 11/12/2015    COLONOSCOPY N/A 3/12/2021     Procedure: COLONOSCOPY;  Surgeon: Brendon Lanier MD;  Location[facility];  Service: Endoscopy;  Laterality: N/A;  covid test 3/9, labs prior, prep instr mailed -ml    INSERTION OF BIVENTRICULAR IMPLANTABLE CARDIOVERTER-DEFIBRILLATOR (ICD)   04/2021    NEPHRECTOMY-LAPAROSCOPIC Left 01/12/2016    PERITONEAL CATHETER INSERTION        Permacath insertion   01/12/2017    SALPINGOOPHORECTOMY Right 2016     KJB---DAVINCI    TONSILLECTOMY        TUBAL LIGATION          Family History         Problem Relation (Age of Onset)     Diabetes Father, Maternal Grandfather     Heart disease Sister     Hypertension Mother     Kidney disease Father     No Known Problems Son, Son, Sister, Brother, Maternal Grandmother, Paternal Grandmother, Paternal Grandfather, Sister, Brother, Maternal Aunt, Maternal Uncle, Paternal Aunt, Paternal Uncle                   Tobacco Use    Smoking status: Never    Smokeless tobacco: Never   Substance and Sexual Activity    Alcohol use: No       Comment: . 2 children. works at [employer].    Drug use: No    Sexual activity: Yes       Partners: Male      Review of Systems   Constitutional:  Negative for appetite change, fatigue, fever and unexpected weight change.   HENT:  Negative for sore throat and trouble swallowing.    Eyes: Negative.    Respiratory:  Negative for cough, shortness of breath and wheezing.    Cardiovascular:  Negative for chest pain and leg swelling.   Gastrointestinal:  Positive for abdominal pain (Mild to moderate in epigastric area). Negative for abdominal  distention, blood in stool, constipation, diarrhea, nausea and vomiting.   Endocrine: Negative.    Genitourinary: Negative.    Musculoskeletal:  Negative for back pain.   Skin: Negative.  Negative for rash.   Allergic/Immunologic: Negative.    Neurological: Negative.    Hematological: Negative.    Psychiatric/Behavioral:  Negative for confusion.    Objective:      Vital Signs (Most Recent):  Temp: 97.5 °F (36.4 °C) (10/30/22 1100)  Pulse: 70 (10/30/22 1230)  Resp: 20 (10/30/22 1230)  BP: 136/67 (10/30/22 1230)  SpO2: 96 % (10/30/22 1230) Vital Signs (24h Range):  Temp:  [97.5 °F (36.4 °C)-98.2 °F (36.8 °C)] 97.5 °F (36.4 °C)  Pulse:  [] 70  Resp:  [18-37] 20  SpO2:  [93 %-100 %] 96 %  BP: (100-231)/() 136/67      Weight: 50 kg (110 lb 3.7 oz)  Body mass index is 18.92 kg/m².     Physical Exam  Vitals and nursing note reviewed.   Constitutional:       Appearance: She is well-developed.   HENT:      Head: Normocephalic and atraumatic.   Cardiovascular:      Rate and Rhythm: Normal rate.      Heart sounds: Normal heart sounds.   Pulmonary:      Effort: Pulmonary effort is normal.   Abdominal:      General: Bowel sounds are normal. There is no distension.      Palpations: Abdomen is soft.      Tenderness: There is abdominal tenderness (mild in epigastric area).   Musculoskeletal:         General: Normal range of motion.      Cervical back: Normal range of motion.   Skin:     General: Skin is warm and dry.      Capillary Refill: Capillary refill takes less than 2 seconds.   Neurological:      Mental Status: She is alert and oriented to person, place, and time.   Psychiatric:         Behavior: Behavior normal.         Significant Labs:  I have reviewed all pertinent lab results within the past 24 hours.  CBC:       Recent Labs   Lab 10/30/22  0744   WBC 11.26   RBC 2.72*   HGB 8.8*   HCT 27.1*      *   MCH 32.4*   MCHC 32.5      CMP:       Recent Labs   Lab 10/30/22  0744   *   CALCIUM 9.1    ALBUMIN 2.4*   PROT 5.2*      K 4.0   CO2 28   CL 96   BUN 59*   CREATININE 4.8*   ALKPHOS 219*   *   *   BILITOT 3.0*         Significant Diagnostics:  Ultrasound     Gallstones.  Thick-walled gallbladder.  Positive sonographic Sloan sign.  Abdominal ascites.  The possibility of acute cholecystitis should be considered.     1.3 x 0.8 x 1.3 cm left hepatic lesion with minimal peripheral blood flow.  Recommend correlation with MRI of the abdomen from 12/28/2020        Assessment/Plan:      Transaminitis  Possibly due to gallstones or possibly intrinsic liver disease.    Will get HIDA scan to evaluate.    Patient unable MRCP due to having an AICD in place.                Easton Thomason MD  General Surgery

## 2022-10-30 NOTE — CARE UPDATE
"I was called by the [facility] doctor, Dr. Vidales, to assist in reviewing Ms. Bloom's case due to worsening BP and labs.  I went to the patient's bedside and reviewed her chart and the day's events.    She has just completed HD from earlier in the day.  On my interview, she is very listless, lethargic and confused.  She denies N/V or abdominal pain.  She denies fever or chhills.  She states, "just my leg hurt."  Her BP and LFT's are progressively worsening.  She has one tiny iv in her thumb, so she needs better IV access.  Will consult Anaesthesia for TLC    She has been started on the following medications for BP:    sacubitriL-valsartan 24-26 mg per tablet 2 tablet, 2 tablet, Oral, BID (new)    metoprolol succinate (TOPROL-XL) 24 hr tablet 100 mg, 100 mg, Oral, Daily    hydrALAZINE injection 20 mg, 20 mg, Intravenous, Q4H PRN (new)    hydrALAZINE tablet 100 mg, 100 mg, Oral, Q8H (new increased dose)    amLODIPine tablet 10 mg, 10 mg, Oral, Daily    Worsening elevated liver enzymes and total bilirubin  Increasing NH3 levels:  -Stat RUQ US  -?Cholecystitis, choledocholithiasis, cholangitis  -?Hepatic congestion from CHF  -?Medications  -Stat acute hepatitis panel, Lipase, Tylenol level    lactulose 20 gram/30 mL solution Soln 10 g, 10 g, Oral, TID    rifAXIMin tablet 550 mg, 550 mg, Oral, BID    Lower leg swelling and pain:  -Stat US legs  -She is already on anticoagulation    apixaban tablet 2.5 mg, 2.5 mg, Oral, BID    Sepsis:  -Unknown source   -?Biliary    piperacillin-tazobactam 4.5 g in dextrose 5 % 100 mL IVPB (ready to mix system), 4.5 g, Intravenous, Q12H    ESRD:  -HD    sodium zirconium cyclosilicate packet 10 g, 10 g, Oral, Daily    sodium bicarbonate 150 mEq in dextrose 5 % 1,000 mL infusion, , Intravenous, Continuous  -Last Rate: 75 mL/hr      Acute on chronic combined systolic and diastolic heart failure with pulmonary hypertension:  6/29/22 Echo  The left ventricle is normal in size with mild concentric " "hypertrophy and moderately decreased systolic function.  Moderate posterior pericardial effusion.  Moderate right atrial enlargement.  Mild tricuspid regurgitation.  Severe left atrial enlargement.  Mild pulmonic regurgitation.  The estimated ejection fraction is 35%.  Grade II left ventricular diastolic dysfunction.  Moderate right ventricular enlargement with mildly reduced right ventricular systolic function.  Mild mitral regurgitation.  Normal central venous pressure (3 mmHg).  The estimated PA systolic pressure is 47 mmHg.  There is pulmonary hypertension.  Mild aortic regurgitation.        BP (!) 228/100   Pulse 101   Temp 97.8 °F (36.6 °C) (Oral)   Resp (!) 31   Ht 5' 4" (1.626 m)   Wt 52.2 kg (115 lb)   LMP 10/24/2016   SpO2 97%   Breastfeeding No   BMI 19.74 kg/m²     Lethargic, but NAD  Flat affect  Confused - oriented to person only, not able to name city or year  She has mild abdominal pain, but it's vague and not localized to RUQ  Her abdomen is soft with slightly decreased BS  She has 2+ edema to legs and hands  No asterixis on exam  Left brachial AVF         Latest Reference Range & Units 10/25/22 11:41 10/29/22 08:28 10/29/22 10:29 10/29/22 18:00 10/29/22 18:13   AST 10 - 40 U/L 53 (H) 295 (H)   542 (H)   ALT 10 - 44 U/L 28 114 (H)   205 (H)   Ammonia 10 - 50 umol/L   56 (H) 71 (H)    (H): Data is abnormally high  Recent Results (from the past 8 hour(s))   Troponin I    Collection Time: 10/29/22  1:35 PM   Result Value Ref Range    Troponin I 3.418 (H) 0.000 - 0.026 ng/mL   Ammonia    Collection Time: 10/29/22  6:00 PM   Result Value Ref Range    Ammonia 71 (H) 10 - 50 umol/L   TSH    Collection Time: 10/29/22  6:00 PM   Result Value Ref Range    TSH 5.286 (H) 0.400 - 4.000 uIU/mL   Blue-top Tube    Collection Time: 10/29/22  6:00 PM   Result Value Ref Range    Extra Blue Top [place] Extra    T4, Free    Collection Time: 10/29/22  6:00 PM   Result Value Ref Range    Free T4 0.81 0.71 - 1.51 " ng/dL   Lactic acid, plasma    Collection Time: 10/29/22  6:13 PM   Result Value Ref Range    Lactate (Lactic Acid) 5.3 (HH) 0.5 - 2.2 mmol/L   Comprehensive metabolic panel    Collection Time: 10/29/22  6:13 PM   Result Value Ref Range    Sodium 138 136 - 145 mmol/L    Potassium 3.9 3.5 - 5.1 mmol/L    Chloride 95 95 - 110 mmol/L    CO2 21 (L) 23 - 29 mmol/L    Glucose 97 70 - 110 mg/dL    BUN 29 (H) 6 - 20 mg/dL    Creatinine 2.6 (H) 0.5 - 1.4 mg/dL    Calcium 9.9 8.7 - 10.5 mg/dL    Total Protein 8.6 (H) 6.0 - 8.4 g/dL    Albumin 4.0 3.5 - 5.2 g/dL    Total Bilirubin 3.7 (H) 0.1 - 1.0 mg/dL    Alkaline Phosphatase 341 (H) 55 - 135 U/L     (H) 10 - 40 U/L     (H) 10 - 44 U/L    Anion Gap 22 (H) 8 - 16 mmol/L    eGFR 21 (A) >60 mL/min/1.73 m^2   Phosphorus    Collection Time: 10/29/22  6:13 PM   Result Value Ref Range    Phosphorus 3.5 2.7 - 4.5 mg/dL

## 2022-10-30 NOTE — HOSPITAL COURSE
Patient seen and examined in the ICU.    Discussed due to her significant abnormal liver panel as well as gallstones will recommend getting a HIDA scan since she has a AICD we can not get an MRcp.  Patient found to have 25% ejection fraction on most recent echo so would be very high risk for surgical intervention.    Based off of HIDA scan will determine if patient would be a surgical candidate for cholecystectomy or if a cholecystostomy tube would be the best option for her.

## 2022-10-30 NOTE — ASSESSMENT & PLAN NOTE
Suspect to liver congestion,will monitor,hepatitis pane l is pending,on lactulose for elevated ammonia.

## 2022-10-30 NOTE — HPI
57 year old female, with pertinent medical history of anemia, kidney cancer, CHF with EF of 35%,, HTN, Pafib on OAC,prediabetes, and myocardiopathy, presents to the ED to evaluate her worsening bilateral leg pain and weakness for 5 days. Patient's  states she was seen for the same symptoms 5 days ago as well as sore throat, chest pain, and nausea. She had labwork done and her results were all negative. Sore throat, chest pain, and nausea have resolved. Patient woke up this morning very weak and fatigued. She was late for dialysis and when she arrived, she was advised to come to the ED. Patient received her dialysis 2 days ago as normal. Patient was able to ambulate alone yesterday with pain, but needs support today.per ER record,  reports she has decreased appetite and is no longer producing urine. No pain medications taken today.  notes that a week ago, patient attended an event and possibly had sick contacts.

## 2022-10-30 NOTE — NURSING
Spoke to EICU , regarding /91, had hydralazine 10 mg IVP at 1819 with no resolution. Informed of inability to obtain ABG, pt is extremely edematous and has an IV placed over radial artery access site. Multiple attempts to access other arteries unsuccessful. MD will enter orders

## 2022-10-31 LAB
ALBUMIN SERPL BCP-MCNC: 1.9 G/DL (ref 3.5–5.2)
ALBUMIN SERPL BCP-MCNC: 2.1 G/DL (ref 3.5–5.2)
ALP SERPL-CCNC: 241 U/L (ref 55–135)
ALP SERPL-CCNC: 255 U/L (ref 55–135)
ALT SERPL W/O P-5'-P-CCNC: 183 U/L (ref 10–44)
ALT SERPL W/O P-5'-P-CCNC: 197 U/L (ref 10–44)
AMMONIA PLAS-SCNC: 33 UMOL/L (ref 10–50)
ANION GAP SERPL CALC-SCNC: 15 MMOL/L (ref 8–16)
ANION GAP SERPL CALC-SCNC: 15 MMOL/L (ref 8–16)
AST SERPL-CCNC: 445 U/L (ref 10–40)
AST SERPL-CCNC: 498 U/L (ref 10–40)
BASOPHILS # BLD AUTO: 0.01 K/UL (ref 0–0.2)
BASOPHILS # BLD AUTO: 0.01 K/UL (ref 0–0.2)
BASOPHILS NFR BLD: 0.1 % (ref 0–1.9)
BASOPHILS NFR BLD: 0.1 % (ref 0–1.9)
BILIRUB SERPL-MCNC: 2.9 MG/DL (ref 0.1–1)
BILIRUB SERPL-MCNC: 3.2 MG/DL (ref 0.1–1)
BUN SERPL-MCNC: 64 MG/DL (ref 6–20)
BUN SERPL-MCNC: 66 MG/DL (ref 6–20)
CALCIUM SERPL-MCNC: 8.2 MG/DL (ref 8.7–10.5)
CALCIUM SERPL-MCNC: 8.7 MG/DL (ref 8.7–10.5)
CHLORIDE SERPL-SCNC: 92 MMOL/L (ref 95–110)
CHLORIDE SERPL-SCNC: 93 MMOL/L (ref 95–110)
CO2 SERPL-SCNC: 31 MMOL/L (ref 23–29)
CO2 SERPL-SCNC: 34 MMOL/L (ref 23–29)
CREAT SERPL-MCNC: 5.4 MG/DL (ref 0.5–1.4)
CREAT SERPL-MCNC: 5.5 MG/DL (ref 0.5–1.4)
DIFFERENTIAL METHOD: ABNORMAL
DIFFERENTIAL METHOD: ABNORMAL
EOSINOPHIL # BLD AUTO: 0 K/UL (ref 0–0.5)
EOSINOPHIL # BLD AUTO: 0 K/UL (ref 0–0.5)
EOSINOPHIL NFR BLD: 0.1 % (ref 0–8)
EOSINOPHIL NFR BLD: 0.1 % (ref 0–8)
ERYTHROCYTE [DISTWIDTH] IN BLOOD BY AUTOMATED COUNT: 21.2 % (ref 11.5–14.5)
ERYTHROCYTE [DISTWIDTH] IN BLOOD BY AUTOMATED COUNT: 21.3 % (ref 11.5–14.5)
EST. GFR  (NO RACE VARIABLE): 8 ML/MIN/1.73 M^2
EST. GFR  (NO RACE VARIABLE): 9 ML/MIN/1.73 M^2
GLUCOSE SERPL-MCNC: 106 MG/DL (ref 70–110)
GLUCOSE SERPL-MCNC: 135 MG/DL (ref 70–110)
HCT VFR BLD AUTO: 27.4 % (ref 37–48.5)
HCT VFR BLD AUTO: 28.2 % (ref 37–48.5)
HGB BLD-MCNC: 9.4 G/DL (ref 12–16)
HGB BLD-MCNC: 9.5 G/DL (ref 12–16)
IMM GRANULOCYTES # BLD AUTO: 0.05 K/UL (ref 0–0.04)
IMM GRANULOCYTES # BLD AUTO: 0.07 K/UL (ref 0–0.04)
IMM GRANULOCYTES NFR BLD AUTO: 0.5 % (ref 0–0.5)
IMM GRANULOCYTES NFR BLD AUTO: 0.7 % (ref 0–0.5)
IRON SERPL-MCNC: 48 UG/DL (ref 30–160)
LYMPHOCYTES # BLD AUTO: 0 K/UL (ref 1–4.8)
LYMPHOCYTES # BLD AUTO: 0 K/UL (ref 1–4.8)
LYMPHOCYTES NFR BLD: 0 % (ref 18–48)
LYMPHOCYTES NFR BLD: 0 % (ref 18–48)
MCH RBC QN AUTO: 33.5 PG (ref 27–31)
MCH RBC QN AUTO: 33.8 PG (ref 27–31)
MCHC RBC AUTO-ENTMCNC: 33.7 G/DL (ref 32–36)
MCHC RBC AUTO-ENTMCNC: 34.3 G/DL (ref 32–36)
MCV RBC AUTO: 99 FL (ref 82–98)
MCV RBC AUTO: 99 FL (ref 82–98)
MONOCYTES # BLD AUTO: 0.3 K/UL (ref 0.3–1)
MONOCYTES # BLD AUTO: 0.3 K/UL (ref 0.3–1)
MONOCYTES NFR BLD: 2.6 % (ref 4–15)
MONOCYTES NFR BLD: 3.2 % (ref 4–15)
NEUTROPHILS # BLD AUTO: 10.2 K/UL (ref 1.8–7.7)
NEUTROPHILS # BLD AUTO: 9.6 K/UL (ref 1.8–7.7)
NEUTROPHILS NFR BLD: 96.1 % (ref 38–73)
NEUTROPHILS NFR BLD: 96.5 % (ref 38–73)
NRBC BLD-RTO: 11 /100 WBC
NRBC BLD-RTO: 11 /100 WBC
PLATELET # BLD AUTO: 144 K/UL (ref 150–450)
PLATELET # BLD AUTO: 159 K/UL (ref 150–450)
PMV BLD AUTO: 10.5 FL (ref 9.2–12.9)
PMV BLD AUTO: 9.7 FL (ref 9.2–12.9)
POTASSIUM SERPL-SCNC: 3.6 MMOL/L (ref 3.5–5.1)
POTASSIUM SERPL-SCNC: 3.8 MMOL/L (ref 3.5–5.1)
PROT SERPL-MCNC: 4.5 G/DL (ref 6–8.4)
PROT SERPL-MCNC: 4.8 G/DL (ref 6–8.4)
RBC # BLD AUTO: 2.78 M/UL (ref 4–5.4)
RBC # BLD AUTO: 2.84 M/UL (ref 4–5.4)
RPR SER QL: NORMAL
SATURATED IRON: 24 % (ref 20–50)
SODIUM SERPL-SCNC: 139 MMOL/L (ref 136–145)
SODIUM SERPL-SCNC: 141 MMOL/L (ref 136–145)
TOTAL IRON BINDING CAPACITY: 197 UG/DL (ref 250–450)
TRANSFERRIN SERPL-MCNC: 133 MG/DL (ref 200–375)
WBC # BLD AUTO: 10.57 K/UL (ref 3.9–12.7)
WBC # BLD AUTO: 9.93 K/UL (ref 3.9–12.7)

## 2022-10-31 PROCEDURE — 84466 ASSAY OF TRANSFERRIN: CPT | Performed by: INTERNAL MEDICINE

## 2022-10-31 PROCEDURE — 82140 ASSAY OF AMMONIA: CPT | Performed by: HOSPITALIST

## 2022-10-31 PROCEDURE — 99223 1ST HOSP IP/OBS HIGH 75: CPT | Mod: ,,, | Performed by: NURSE PRACTITIONER

## 2022-10-31 PROCEDURE — 80053 COMPREHEN METABOLIC PANEL: CPT | Performed by: HOSPITALIST

## 2022-10-31 PROCEDURE — 99223 PR INITIAL HOSPITAL CARE,LEVL III: ICD-10-PCS | Mod: ,,, | Performed by: NURSE PRACTITIONER

## 2022-10-31 PROCEDURE — 99232 PR SUBSEQUENT HOSPITAL CARE,LEVL II: ICD-10-PCS | Mod: ,,, | Performed by: SURGERY

## 2022-10-31 PROCEDURE — 99232 SBSQ HOSP IP/OBS MODERATE 35: CPT | Mod: ,,, | Performed by: SURGERY

## 2022-10-31 PROCEDURE — 80100014 HC HEMODIALYSIS 1:1

## 2022-10-31 PROCEDURE — 20000000 HC ICU ROOM

## 2022-10-31 PROCEDURE — 25000003 PHARM REV CODE 250: Performed by: INTERNAL MEDICINE

## 2022-10-31 PROCEDURE — 25000003 PHARM REV CODE 250: Performed by: HOSPITALIST

## 2022-10-31 PROCEDURE — 99223 PR INITIAL HOSPITAL CARE,LEVL III: ICD-10-PCS | Mod: ,,, | Performed by: REGISTERED NURSE

## 2022-10-31 PROCEDURE — 63600175 PHARM REV CODE 636 W HCPCS: Performed by: HOSPITALIST

## 2022-10-31 PROCEDURE — 63600175 PHARM REV CODE 636 W HCPCS: Mod: JG | Performed by: HOSPITALIST

## 2022-10-31 PROCEDURE — 85025 COMPLETE CBC W/AUTO DIFF WBC: CPT | Performed by: HOSPITALIST

## 2022-10-31 PROCEDURE — 99223 1ST HOSP IP/OBS HIGH 75: CPT | Mod: ,,, | Performed by: REGISTERED NURSE

## 2022-10-31 RX ADMIN — RIFAXIMIN 550 MG: 550 TABLET ORAL at 10:10

## 2022-10-31 RX ADMIN — MUPIROCIN: 20 OINTMENT TOPICAL at 10:10

## 2022-10-31 RX ADMIN — PIPERACILLIN AND TAZOBACTAM 4.5 G: 4; .5 INJECTION, POWDER, LYOPHILIZED, FOR SOLUTION INTRAVENOUS; PARENTERAL at 10:10

## 2022-10-31 RX ADMIN — HYDRALAZINE HYDROCHLORIDE 100 MG: 25 TABLET, FILM COATED ORAL at 05:10

## 2022-10-31 RX ADMIN — SODIUM BICARBONATE: 84 INJECTION, SOLUTION INTRAVENOUS at 04:10

## 2022-10-31 RX ADMIN — EPOETIN ALFA-EPBX 2520 UNITS: 4000 INJECTION, SOLUTION INTRAVENOUS; SUBCUTANEOUS at 10:10

## 2022-10-31 RX ADMIN — SODIUM ZIRCONIUM CYCLOSILICATE 10 G: 10 POWDER, FOR SUSPENSION ORAL at 10:10

## 2022-10-31 NOTE — ASSESSMENT & PLAN NOTE
Denies abdominal  Pain,duo to sepsis.will monitor.  Improved with HD and bicarb drip.  Resolved.

## 2022-10-31 NOTE — PROGRESS NOTES
Awake    NAD    Hungry     Some soreness in her abd not very localized    ROS    Denies cns ent cp gu rheum sx       Past Medical History:   Diagnosis Date    Anemia     Bronchitis 03/01/2017    Cancer 2016    kidney cancer    CHF (congestive heart failure), NYHA class II, chronic, systolic     CMV (cytomegalovirus) antibody positive     Essential hypertension 9/23/2015    H/O herpes simplex type 2 infection     Herpes simplex type 1 antibody positive     History of kidney cancer     s/p left nephrectomy 1/2016    Hyperparathyroidism, unspecified     Hyperuricemia without signs of inflammatory arthritis and tophaceous disease     Kidney stones     LGSIL (low grade squamous intraepithelial dysplasia)     Myocardiopathy 7/21/2017    Prediabetes     Proteinuria     Renal disorder     Thyroid nodule     Urate nephropathy      Past Surgical History:   Procedure Laterality Date    BREAST CYST EXCISION      COLONOSCOPY N/A 11/12/2015    COLONOSCOPY N/A 3/12/2021    Procedure: COLONOSCOPY;  Surgeon: Brendon Lanier MD;  Location: [facility];  Service: Endoscopy;  Laterality: N/A;  covid test 3/9, labs prior, prep instr mailed -ml    INSERTION OF BIVENTRICULAR IMPLANTABLE CARDIOVERTER-DEFIBRILLATOR (ICD)  04/2021    NEPHRECTOMY-LAPAROSCOPIC Left 01/12/2016    PERITONEAL CATHETER INSERTION      Permacath insertion  01/12/2017    SALPINGOOPHORECTOMY Right 2016    KJB---DAVINCI    TONSILLECTOMY      TUBAL LIGATION       Review of patient's allergies indicates:   Allergen Reactions    Coreg [carvedilol] Other (See Comments)     Nausea/vomiting    Allopurinol      Other reaction(s): abnormal transaminases       Current Facility-Administered Medications   Medication    0.9%  NaCl infusion    0.9%  NaCl infusion    acetaminophen tablet 650 mg    amLODIPine tablet 10 mg    dextrose 10% bolus 250 mL    hydrALAZINE injection 20 mg    hydrALAZINE tablet 100 mg    labetaloL injection 10 mg    lactulose 20 gram/30 mL solution Soln 10  g    metoprolol succinate (TOPROL-XL) 24 hr tablet 100 mg    mupirocin 2 % ointment    piperacillin-tazobactam 4.5 g in dextrose 5 % 100 mL IVPB (ready to mix system)    rifAXIMin tablet 550 mg    sacubitriL-valsartan 24-26 mg per tablet 2 tablet    sodium chloride 0.9% bolus 250 mL    sodium zirconium cyclosilicate packet 10 g       LABS    Recent Results (from the past 24 hour(s))   CBC auto differential    Collection Time: 10/30/22 11:51 PM   Result Value Ref Range    WBC 10.57 3.90 - 12.70 K/uL    RBC 2.84 (L) 4.00 - 5.40 M/uL    Hemoglobin 9.5 (L) 12.0 - 16.0 g/dL    Hematocrit 28.2 (L) 37.0 - 48.5 %    MCV 99 (H) 82 - 98 fL    MCH 33.5 (H) 27.0 - 31.0 pg    MCHC 33.7 32.0 - 36.0 g/dL    RDW 21.2 (H) 11.5 - 14.5 %    Platelets 159 150 - 450 K/uL    MPV 9.7 9.2 - 12.9 fL    Immature Granulocytes 0.5 0.0 - 0.5 %    Gran # (ANC) 10.2 (H) 1.8 - 7.7 K/uL    Immature Grans (Abs) 0.05 (H) 0.00 - 0.04 K/uL    Lymph # 0.0 (L) 1.0 - 4.8 K/uL    Mono # 0.3 0.3 - 1.0 K/uL    Eos # 0.0 0.0 - 0.5 K/uL    Baso # 0.01 0.00 - 0.20 K/uL    nRBC 11 (A) 0 /100 WBC    Gran % 96.1 (H) 38.0 - 73.0 %    Lymph % 0.0 (L) 18.0 - 48.0 %    Mono % 3.2 (L) 4.0 - 15.0 %    Eosinophil % 0.1 0.0 - 8.0 %    Basophil % 0.1 0.0 - 1.9 %    Differential Method Automated    Comprehensive metabolic panel    Collection Time: 10/30/22 11:51 PM   Result Value Ref Range    Sodium 139 136 - 145 mmol/L    Potassium 3.8 3.5 - 5.1 mmol/L    Chloride 93 (L) 95 - 110 mmol/L    CO2 31 (H) 23 - 29 mmol/L    Glucose 135 (H) 70 - 110 mg/dL    BUN 64 (H) 6 - 20 mg/dL    Creatinine 5.4 (H) 0.5 - 1.4 mg/dL    Calcium 8.7 8.7 - 10.5 mg/dL    Total Protein 4.8 (L) 6.0 - 8.4 g/dL    Albumin 2.1 (L) 3.5 - 5.2 g/dL    Total Bilirubin 2.9 (H) 0.1 - 1.0 mg/dL    Alkaline Phosphatase 241 (H) 55 - 135 U/L     (H) 10 - 40 U/L     (H) 10 - 44 U/L    Anion Gap 15 8 - 16 mmol/L    eGFR 9 (A) >60 mL/min/1.73 m^2   Ammonia    Collection Time: 10/31/22  2:38 AM    Result Value Ref Range    Ammonia 33 10 - 50 umol/L   Iron and TIBC    Collection Time: 10/31/22  8:40 AM   Result Value Ref Range    Iron 48 30 - 160 ug/dL    Transferrin 133 (L) 200 - 375 mg/dL    TIBC 197 (L) 250 - 450 ug/dL    Saturated Iron 24 20 - 50 %   CBC auto differential    Collection Time: 10/31/22  8:40 AM   Result Value Ref Range    WBC 9.93 3.90 - 12.70 K/uL    RBC 2.78 (L) 4.00 - 5.40 M/uL    Hemoglobin 9.4 (L) 12.0 - 16.0 g/dL    Hematocrit 27.4 (L) 37.0 - 48.5 %    MCV 99 (H) 82 - 98 fL    MCH 33.8 (H) 27.0 - 31.0 pg    MCHC 34.3 32.0 - 36.0 g/dL    RDW 21.3 (H) 11.5 - 14.5 %    Platelets 144 (L) 150 - 450 K/uL    MPV 10.5 9.2 - 12.9 fL    Immature Granulocytes 0.7 (H) 0.0 - 0.5 %    Gran # (ANC) 9.6 (H) 1.8 - 7.7 K/uL    Immature Grans (Abs) 0.07 (H) 0.00 - 0.04 K/uL    Lymph # 0.0 (L) 1.0 - 4.8 K/uL    Mono # 0.3 0.3 - 1.0 K/uL    Eos # 0.0 0.0 - 0.5 K/uL    Baso # 0.01 0.00 - 0.20 K/uL    nRBC 11 (A) 0 /100 WBC    Gran % 96.5 (H) 38.0 - 73.0 %    Lymph % 0.0 (L) 18.0 - 48.0 %    Mono % 2.6 (L) 4.0 - 15.0 %    Eosinophil % 0.1 0.0 - 8.0 %    Basophil % 0.1 0.0 - 1.9 %    Differential Method Automated    Comprehensive metabolic panel    Collection Time: 10/31/22  8:40 AM   Result Value Ref Range    Sodium 141 136 - 145 mmol/L    Potassium 3.6 3.5 - 5.1 mmol/L    Chloride 92 (L) 95 - 110 mmol/L    CO2 34 (H) 23 - 29 mmol/L    Glucose 106 70 - 110 mg/dL    BUN 66 (H) 6 - 20 mg/dL    Creatinine 5.5 (H) 0.5 - 1.4 mg/dL    Calcium 8.2 (L) 8.7 - 10.5 mg/dL    Total Protein 4.5 (L) 6.0 - 8.4 g/dL    Albumin 1.9 (L) 3.5 - 5.2 g/dL    Total Bilirubin 3.2 (H) 0.1 - 1.0 mg/dL    Alkaline Phosphatase 255 (H) 55 - 135 U/L     (H) 10 - 40 U/L     (H) 10 - 44 U/L    Anion Gap 15 8 - 16 mmol/L    eGFR 8 (A) >60 mL/min/1.73 m^2   ]    I/O last 3 completed shifts:  In: 3208 [P.O.:345; I.V.:2671.1; IV Piggyback:191.9]  Out: -     Vitals:    10/31/22 0600 10/31/22 0700 10/31/22 0800 10/31/22 0900    BP: 127/68 124/70 (!) 81/56 119/60   Pulse: 65 64 67 68   Resp: 18 16 (!) 23 13   Temp:  97.1 °F (36.2 °C)     TempSrc:  Oral     SpO2: 97% 98% 96% 95%   Weight:       Height:           No Jvd, Thyromegaly or Lymphadenopathy  Lungs: Fairly clear anteriorly and laterally but few diffuse faint lat rhales   Cor: RRR no G or rubs  Abd: Soft benign good bowel sounds non tender  Ext: No E C C    A)    ESRD  Sepsis, ? GB infection  CHF/CMP  Pulm htn   Anemia  2nd hyperpth  PAF  Lactic acidosis  HX of RCC sp nephrectomy     P)    HD TIW  Epo prn  Nutritional support  Binders when able   Antibiotx as needed adjusted to renal fx   ? Surgery     Reconsider entresto     Px guarded

## 2022-10-31 NOTE — PLAN OF CARE
Pt AAO, drowsy at times. Temp 95.6, ella hugger applied, other vitals stable. HIDA scan done today. Waiting for dialysis.

## 2022-10-31 NOTE — PROGRESS NOTES
[facility] Medicine  Progress Note    Patient Name: Eva Bloom  MRN: [id number]  Patient Class: IP- Inpatient   Admission Date: 10/29/2022  Length of Stay: 2 days  Attending Physician: José Miguel Cristina MD  Primary Care Provider: Sowmya Mccain MD        Subjective:     Principal Problem:Sepsis        HPI:  57 year old female, with pertinent medical history of anemia, kidney cancer, CHF with EF of 35%,, HTN, Pafib on OAC,prediabetes, and myocardiopathy, presents to the ED to evaluate her worsening bilateral leg pain and weakness for 5 days. Patient's  states she was seen for the same symptoms 5 days ago as well as sore throat, chest pain, and nausea. She had labwork done and her results were all negative. Sore throat, chest pain, and nausea have resolved. Patient woke up this morning very weak and fatigued. She was late for dialysis and when she arrived, she was advised to come to the ED. Patient received her dialysis 2 days ago as normal. Patient was able to ambulate alone yesterday with pain, but needs support today.per ER record,  reports she has decreased appetite and is no longer producing urine. No pain medications taken today.  notes that a week ago, patient attended an event and possibly had sick contacts. Patient denies congestion, cough, trauma to legs,or other associated symptoms,patient is hypothermic in ER 93%,has elevated lactic acid,more than 12,has leucocytosis 14,patient is septic,can not gave IVF duo to  ESRD,has been  started on broad spectrum IV Abx.he has severe acidosis C O 2 of 8 and elevated Troponin 2.8,she denies chest apian dn abdominal pain,only complain is leg pain.she has also Hyperkalemia 6.0.      Overview/Hospital Course:  57 year old female, with pertinent medical history of anemia, kidney cancer, CHF with EF of 35%,, HTN, Pafib on OAC,prediabetes, and myocardiopathy, presents to the ED to evaluate her worsening bilateral leg  pain and weakness for 5 days. Patient's  states she was seen for the same symptoms 5 days ago as well as sore throat, chest pain, and nausea. She had labwork done and her results were all negative. Sore throat, chest pain, and nausea have resolved. patient was  hypothermic in ER 93%,improved with bear hug,has elevated lactic acid,more than 12,improved with bicarb drip and HD,has leucocytosis 14,patient is septic,can not giving IVF duo to  ESRD,has been  started on broad spectrum IV Abx.he has severe acidosis C O 2 of 8 ,improved with HD and bicarb drip,and elevated Troponin 2.8,cardiology is consulted,echo. Show EF of 25%,she denies chest pain,no plan for intervention at this time.  US abdomen show possible acute cholecystitis,on IV Abx ,surgery is consulted.denies abdominal pain.plan for HIDA scan and if is positive need IR consult for cholecystomy tube placement,not good surgical candidate,OAC is on hold.  Has transaminitis with elevated ammonia,hepatitis panel is negative,on lactulose.ammonal level is improved,consulted GI.  Hyperkalemia 6.0.with HD is improved.  Consulted PT,OT.  CC is weakness.      Past Medical History:   Diagnosis Date    Anemia     Bronchitis 03/01/2017    Cancer 2016    kidney cancer    CHF (congestive heart failure), NYHA class II, chronic, systolic     CMV (cytomegalovirus) antibody positive     Essential hypertension 9/23/2015    H/O herpes simplex type 2 infection     Herpes simplex type 1 antibody positive     History of kidney cancer     s/p left nephrectomy 1/2016    Hyperparathyroidism, unspecified     Hyperuricemia without signs of inflammatory arthritis and tophaceous disease     Kidney stones     LGSIL (low grade squamous intraepithelial dysplasia)     Myocardiopathy 7/21/2017    Prediabetes     Proteinuria     Renal disorder     Thyroid nodule     Urate nephropathy        Past Surgical History:   Procedure Laterality Date    BREAST CYST EXCISION       COLONOSCOPY N/A 11/12/2015    COLONOSCOPY N/A 3/12/2021    Procedure: COLONOSCOPY;  Surgeon: Brendon Lanier MD;  Location: [facility];  Service: Endoscopy;  Laterality: N/A;  covid test 3/9, labs prior, prep instr mailed -ml    INSERTION OF BIVENTRICULAR IMPLANTABLE CARDIOVERTER-DEFIBRILLATOR (ICD)  04/2021    NEPHRECTOMY-LAPAROSCOPIC Left 01/12/2016    PERITONEAL CATHETER INSERTION      Permacath insertion  01/12/2017    SALPINGOOPHORECTOMY Right 2016    KJB---DAVINCI    TONSILLECTOMY      TUBAL LIGATION         Review of patient's allergies indicates:   Allergen Reactions    Coreg [carvedilol] Other (See Comments)     Nausea/vomiting    Allopurinol      Other reaction(s): abnormal transaminases       No current facility-administered medications on file prior to encounter.     Current Outpatient Medications on File Prior to Encounter   Medication Sig    acetaminophen (TYLENOL) 500 MG tablet Take 500 mg by mouth every 6 (six) hours as needed for Pain.    apixaban (ELIQUIS) 2.5 mg Tab Take 1 tablet (2.5 mg total) by mouth 2 (two) times daily.    CABOMETYX 60 mg Tab TAKE ONE TABLET BY MOUTH ONCE DAILY AT THE SAME TIME ON AN EMPTY STOMACH AT LEAST 1 HOUR BEFORE OR 2 HOURS AFTER EATING. AVOID GRAPEFRUIT PRODUCTS    cloNIDine 0.3 mg/24 hr td ptwk (CATAPRES) 0.3 mg/24 hr Place 1 patch onto the skin every 7 days.    epoetin david-epbx (RETACRIT INJ) Epoetin david - epbx (Retacrit)    hydrALAZINE (APRESOLINE) 100 MG tablet Take 1 tablet (100 mg total) by mouth every 12 (twelve) hours.    lidocaine-prilocaine (EMLA) cream APPLY ATLEAST 30 MINUTES BEFORE TREATMENT 3 TIMES A WEEK    metoprolol succinate (TOPROL-XL) 100 MG 24 hr tablet Take 1 tablet (100 mg total) by mouth once daily.    mv,Ca,min-folic acid-vit K1 (ONE-A-DAY WOMEN'S 50 PLUS) 400-20 mcg Tab Take 1 tablet by mouth.    ondansetron (ZOFRAN-ODT) 8 MG TbDL Take 1 tablet (8 mg total) by mouth every 12 (twelve) hours as needed.     oxyCODONE-acetaminophen (PERCOCET) 5-325 mg per tablet Take 1 tablet by mouth every 6 (six) hours as needed for Pain.    oxyCODONE-acetaminophen (PERCOCET) 5-325 mg per tablet Take 1 tablet by mouth every 4 (four) hours as needed.    promethazine (PHENERGAN) 25 MG tablet Take 1 tablet (25 mg total) by mouth every 6 (six) hours as needed for Nausea.    sacubitriL-valsartan (ENTRESTO) 49-51 mg per tablet Take 1 tablet by mouth 2 (two) times daily.    sevelamer carbonate (RENVELA) 800 mg Tab TAKE 2 TABLETS (1,600 MG TOTAL) BY MOUTH 3 (THREE) TIMES DAILY WITH MEALS. (Patient not taking: Reported on 10/26/2022)    sodium chloride 0.9% SolP 100 mL with iron sucrose 100 mg iron/5 mL Soln 100 mg 50 mg.    [DISCONTINUED] amLODIPine (NORVASC) 5 MG tablet TAKE 1 TABLET BY MOUTH EVERY DAY     Family History       Problem Relation (Age of Onset)    Diabetes Father, Maternal Grandfather    Heart disease Sister    Hypertension Mother    Kidney disease Father    No Known Problems Son, Son, Sister, Brother, Maternal Grandmother, Paternal Grandmother, Paternal Grandfather, Sister, Brother, Maternal Aunt, Maternal Uncle, Paternal Aunt, Paternal Uncle          Tobacco Use    Smoking status: Never    Smokeless tobacco: Never   Substance and Sexual Activity    Alcohol use: No     Comment: . 2 children. works at [employer].    Drug use: No    Sexual activity: Yes     Partners: Male     Review of Systems   Constitutional:  Positive for activity change and appetite change.   HENT:  Negative for congestion and dental problem.    Eyes:  Negative for pain and discharge.   Respiratory:  Negative for apnea and chest tightness.    Cardiovascular:  Negative for chest pain and leg swelling.   Gastrointestinal:  Negative for abdominal distention and abdominal pain.   Endocrine: Negative for cold intolerance and heat intolerance.   Genitourinary:  Negative for difficulty urinating and dyspareunia.   Musculoskeletal:  Positive for  arthralgias and myalgias.   Skin:  Negative for color change and pallor.   Allergic/Immunologic: Negative for environmental allergies and food allergies.   Neurological:  Negative for dizziness and facial asymmetry.   Hematological:  Negative for adenopathy. Does not bruise/bleed easily.   Psychiatric/Behavioral:  Negative for agitation.    Objective:     Vital Signs (Most Recent):  Temp: 97.1 °F (36.2 °C) (10/31/22 0700)  Pulse: 68 (10/31/22 0900)  Resp: 13 (10/31/22 0900)  BP: 119/60 (10/31/22 0900)  SpO2: 95 % (10/31/22 0900)   Vital Signs (24h Range):  Temp:  [97.1 °F (36.2 °C)-97.8 °F (36.6 °C)] 97.1 °F (36.2 °C)  Pulse:  [64-79] 68  Resp:  [13-26] 13  SpO2:  [93 %-98 %] 95 %  BP: ()/(39-97) 119/60     Weight: 50 kg (110 lb 3.7 oz)  Body mass index is 18.92 kg/m².    Physical Exam  HENT:      Head: Normocephalic.      Nose: Nose normal.      Mouth/Throat:      Mouth: Mucous membranes are dry.   Eyes:      Extraocular Movements: Extraocular movements intact.      Pupils: Pupils are equal, round, and reactive to light.   Cardiovascular:      Rate and Rhythm: Normal rate and regular rhythm.      Heart sounds: No murmur heard.  Pulmonary:      Effort: No respiratory distress.      Breath sounds: No wheezing.   Abdominal:      General: Abdomen is flat. There is no distension.      Palpations: Abdomen is soft.      Tenderness: There is no abdominal tenderness.   Musculoskeletal:         General: Swelling and tenderness present.      Cervical back: Normal range of motion and neck supple.   Skin:     Coloration: Skin is not jaundiced.      Findings: No bruising.   Neurological:      General: No focal deficit present.      Mental Status: She is alert and oriented to person, place, and time.      Cranial Nerves: No cranial nerve deficit.      Motor: No weakness.   Psychiatric:         Mood and Affect: Mood normal.         Behavior: Behavior normal.         CRANIAL NERVES     CN III, IV, VI   Pupils are equal,  round, and reactive to light.     Significant Labs: All pertinent labs within the past 24 hours have been reviewed.  BMP:   Recent Labs   Lab 10/31/22  0840         K 3.6   CL 92*   CO2 34*   BUN 66*   CREATININE 5.5*   CALCIUM 8.2*       CBC:   Recent Labs   Lab 10/30/22  0744 10/30/22  2351 10/31/22  0840   WBC 11.26 10.57 9.93   HGB 8.8* 9.5* 9.4*   HCT 27.1* 28.2* 27.4*    159 144*       CMP:   Recent Labs   Lab 10/30/22  0744 10/30/22  2351 10/31/22  0840    139 141   K 4.0 3.8 3.6   CL 96 93* 92*   CO2 28 31* 34*   * 135* 106   BUN 59* 64* 66*   CREATININE 4.8* 5.4* 5.5*   CALCIUM 9.1 8.7 8.2*   PROT 5.2* 4.8* 4.5*   ALBUMIN 2.4* 2.1* 1.9*   BILITOT 3.0* 2.9* 3.2*   ALKPHOS 219* 241* 255*   * 498* 445*   * 197* 183*   ANIONGAP 17* 15 15       Lactic Acid:   Recent Labs   Lab 10/29/22  1813 10/30/22  0146 10/30/22  0744   LACTATE 5.3* 2.2 1.5         Significant Imaging: I have reviewed all pertinent imaging results/findings within the past 24 hours.    CC is weakness.  Assessment/Plan:      * Sepsis  This patient does have evidence of infective focus  My overall impression is sepsis. Vital signs were reviewed and noted in progress note.  Antibiotics given-   Antibiotics (From admission, onward)    Start     Stop Route Frequency Ordered    10/30/22 2100  piperacillin-tazobactam 4.5 g in dextrose 5 % 100 mL IVPB (ready to mix system)         -- IV Every 12 hours (non-standard times) 10/30/22 1012    10/29/22 2100  rifAXIMin tablet 550 mg         -- Oral 2 times daily 10/29/22 1942    10/29/22 1245  mupirocin 2 % ointment         11/03 0859 Nasl 2 times daily 10/29/22 1140        Cultures were taken-   Microbiology Results (last 7 days)     Procedure Component Value Units Date/Time    Blood Culture #1 **CANNOT BE ORDERED STAT** [[id number]] Collected: 10/29/22 0941    Order Status: Completed Specimen: Blood from Peripheral, Hand, Right Updated: 10/30/22 1503     Blood  Culture, Routine No Growth to date      No Growth to date    Blood Culture #2 **CANNOT BE ORDERED STAT** [[id number]] Collected: 10/29/22 0915    Order Status: Completed Specimen: Blood from Peripheral, Hand, Right Updated: 10/30/22 1503     Blood Culture, Routine No Growth to date      No Growth to date        Latest lactate reviewed, they are-  Recent Labs   Lab 10/29/22  1813 10/30/22  0146 10/30/22  0744   LACTATE 5.3* 2.2 1.5       Organ dysfunction indicated by Acute liver injury  Source- possible cholecyctitis     Source control Achieved by- IV Abx.    US abdomen show possible acute cholecystitis,on IV Abx ,surgery is consulted.denies abdominal pain..plan for HIDA scan,can not  have MRCP duo to AICD, and if is positive need IR consult for cholecystomy tube placement,not good surgical candidate,OAC is on hold.            Lactic acidosis  Denies abdominal  Pain,duo to sepsis.will monitor.  Improved with HD and bicarb drip.  Resolved.      Transaminitis  Suspect to liver congestion,will monitor,hepatitis panel  is negative,,on lactulose for elevated ammonia.will have HIDA scan,consulted GI also for evaluations.      Elevated troponin  Denies chest pain,will monitor,checked Echo,has EF of 25%,cardiology is on case,.she denies chest pain.no pan for intervention at this time.      PAF (paroxysmal atrial fibrillation)  Patient with Persistent (7 days or more) atrial fibrillation which is controlled currently with Beta Blocker. Patient is currently in sinus rhythm.[id number] Score: 1. HASBLED Score: . Anticoagulation indicated. Anticoagulation done with eliquis.  OAC on hold at this time duo to possible procedure.        ICD (implantable cardioverter-defibrillator) in place - SubQ  Will monitor.      Chronic kidney disease-mineral and bone disorder  On HD.      Anemia in ESRD (end-stage renal disease)  Will monitor.      Hyperkalemia  Started on lokelma.improved.      Chronic combined systolic and diastolic congestive  heart failure  With EF of 35%,fludi removal with HD.new Echo. Show EF of 25%.      Nonrheumatic mitral valve regurgitation  Per record,      ESRD (end stage renal disease) on dialysis  On  HD,nephrioolgy is consulted.      Pulmonary hypertension  per record.      Cardiomyopathy, nonischemic  Check echo.show EF of 25%,      S/p nephrectomy left  Per record.      History of kidney cancer  per record.      Essential hypertension  Resumed home medications..        VTE Risk Mitigation (From admission, onward)    None          Discharge Planning   MAVIS:      Code Status: Prior   Is the patient medically ready for discharge?:     Reason for patient still in hospital (select all that apply): Patient trending condition  Discharge Plan A: Home Health            Critical care time spent on the evaluation and treatment of severe organ dysfunction, review of pertinent labs and imaging studies, discussions with consulting providers and discussions with patient/family:  Over 45  minutes.      José Miguel Cristina MD  Department of Hospital Medicine   [facility] [place] [facility] [facility]

## 2022-10-31 NOTE — ASSESSMENT & PLAN NOTE
Suspect to liver congestion,will monitor,hepatitis panel  is negative,,on lactulose for elevated ammonia.will have HIDA scan,consulted GI also for evaluations.

## 2022-10-31 NOTE — SUBJECTIVE & OBJECTIVE
Past Medical History:   Diagnosis Date    Anemia     Bronchitis 03/01/2017    Cancer 2016    kidney cancer    CHF (congestive heart failure), NYHA class II, chronic, systolic     CMV (cytomegalovirus) antibody positive     Essential hypertension 9/23/2015    H/O herpes simplex type 2 infection     Herpes simplex type 1 antibody positive     History of kidney cancer     s/p left nephrectomy 1/2016    Hyperparathyroidism, unspecified     Hyperuricemia without signs of inflammatory arthritis and tophaceous disease     Kidney stones     LGSIL (low grade squamous intraepithelial dysplasia)     Myocardiopathy 7/21/2017    Prediabetes     Proteinuria     Renal disorder     Thyroid nodule     Urate nephropathy        Past Surgical History:   Procedure Laterality Date    BREAST CYST EXCISION      COLONOSCOPY N/A 11/12/2015    COLONOSCOPY N/A 3/12/2021    Procedure: COLONOSCOPY;  Surgeon: Brendon Lanier MD;  Location: [facility];  Service: Endoscopy;  Laterality: N/A;  covid test 3/9, labs prior, prep instr mailed -ml    INSERTION OF BIVENTRICULAR IMPLANTABLE CARDIOVERTER-DEFIBRILLATOR (ICD)  04/2021    NEPHRECTOMY-LAPAROSCOPIC Left 01/12/2016    PERITONEAL CATHETER INSERTION      Permacath insertion  01/12/2017    SALPINGOOPHORECTOMY Right 2016    KJB---DAVINCI    TONSILLECTOMY      TUBAL LIGATION         Review of patient's allergies indicates:   Allergen Reactions    Coreg [carvedilol] Other (See Comments)     Nausea/vomiting    Allopurinol      Other reaction(s): abnormal transaminases       No current facility-administered medications on file prior to encounter.     Current Outpatient Medications on File Prior to Encounter   Medication Sig    acetaminophen (TYLENOL) 500 MG tablet Take 500 mg by mouth every 6 (six) hours as needed for Pain.    apixaban (ELIQUIS) 2.5 mg Tab Take 1 tablet (2.5 mg total) by mouth 2 (two) times daily.    CABOMETYX 60 mg Tab TAKE ONE TABLET BY MOUTH ONCE DAILY AT THE SAME TIME ON AN EMPTY  STOMACH AT LEAST 1 HOUR BEFORE OR 2 HOURS AFTER EATING. AVOID GRAPEFRUIT PRODUCTS    cloNIDine 0.3 mg/24 hr td ptwk (CATAPRES) 0.3 mg/24 hr Place 1 patch onto the skin every 7 days.    epoetin david-epbx (RETACRIT INJ) Epoetin david - epbx (Retacrit)    hydrALAZINE (APRESOLINE) 100 MG tablet Take 1 tablet (100 mg total) by mouth every 12 (twelve) hours.    lidocaine-prilocaine (EMLA) cream APPLY ATLEAST 30 MINUTES BEFORE TREATMENT 3 TIMES A WEEK    metoprolol succinate (TOPROL-XL) 100 MG 24 hr tablet Take 1 tablet (100 mg total) by mouth once daily.    mv,Ca,min-folic acid-vit K1 (ONE-A-DAY WOMEN'S 50 PLUS) 400-20 mcg Tab Take 1 tablet by mouth.    ondansetron (ZOFRAN-ODT) 8 MG TbDL Take 1 tablet (8 mg total) by mouth every 12 (twelve) hours as needed.    oxyCODONE-acetaminophen (PERCOCET) 5-325 mg per tablet Take 1 tablet by mouth every 6 (six) hours as needed for Pain.    oxyCODONE-acetaminophen (PERCOCET) 5-325 mg per tablet Take 1 tablet by mouth every 4 (four) hours as needed.    promethazine (PHENERGAN) 25 MG tablet Take 1 tablet (25 mg total) by mouth every 6 (six) hours as needed for Nausea.    sacubitriL-valsartan (ENTRESTO) 49-51 mg per tablet Take 1 tablet by mouth 2 (two) times daily.    sevelamer carbonate (RENVELA) 800 mg Tab TAKE 2 TABLETS (1,600 MG TOTAL) BY MOUTH 3 (THREE) TIMES DAILY WITH MEALS. (Patient not taking: Reported on 10/26/2022)    sodium chloride 0.9% SolP 100 mL with iron sucrose 100 mg iron/5 mL Soln 100 mg 50 mg.    [DISCONTINUED] amLODIPine (NORVASC) 5 MG tablet TAKE 1 TABLET BY MOUTH EVERY DAY     Family History       Problem Relation (Age of Onset)    Diabetes Father, Maternal Grandfather    Heart disease Sister    Hypertension Mother    Kidney disease Father    No Known Problems Son, Son, Sister, Brother, Maternal Grandmother, Paternal Grandmother, Paternal Grandfather, Sister, Brother, Maternal Aunt, Maternal Uncle, Paternal Aunt, Paternal Uncle          Tobacco Use    Smoking  status: Never    Smokeless tobacco: Never   Substance and Sexual Activity    Alcohol use: No     Comment: . 2 children. works at [employer].    Drug use: No    Sexual activity: Yes     Partners: Male     Review of Systems   Constitutional:  Positive for activity change and appetite change.   HENT:  Negative for congestion and dental problem.    Eyes:  Negative for pain and discharge.   Respiratory:  Negative for apnea and chest tightness.    Cardiovascular:  Negative for chest pain and leg swelling.   Gastrointestinal:  Negative for abdominal distention and abdominal pain.   Endocrine: Negative for cold intolerance and heat intolerance.   Genitourinary:  Negative for difficulty urinating and dyspareunia.   Musculoskeletal:  Positive for arthralgias and myalgias.   Skin:  Negative for color change and pallor.   Allergic/Immunologic: Negative for environmental allergies and food allergies.   Neurological:  Negative for dizziness and facial asymmetry.   Hematological:  Negative for adenopathy. Does not bruise/bleed easily.   Psychiatric/Behavioral:  Negative for agitation.    Objective:     Vital Signs (Most Recent):  Temp: 97.1 °F (36.2 °C) (10/31/22 0700)  Pulse: 68 (10/31/22 0900)  Resp: 13 (10/31/22 0900)  BP: 119/60 (10/31/22 0900)  SpO2: 95 % (10/31/22 0900)   Vital Signs (24h Range):  Temp:  [97.1 °F (36.2 °C)-97.8 °F (36.6 °C)] 97.1 °F (36.2 °C)  Pulse:  [64-79] 68  Resp:  [13-26] 13  SpO2:  [93 %-98 %] 95 %  BP: ()/(39-97) 119/60     Weight: 50 kg (110 lb 3.7 oz)  Body mass index is 18.92 kg/m².    Physical Exam  HENT:      Head: Normocephalic.      Nose: Nose normal.      Mouth/Throat:      Mouth: Mucous membranes are dry.   Eyes:      Extraocular Movements: Extraocular movements intact.      Pupils: Pupils are equal, round, and reactive to light.   Cardiovascular:      Rate and Rhythm: Normal rate and regular rhythm.      Heart sounds: No murmur heard.  Pulmonary:      Effort: No respiratory  distress.      Breath sounds: No wheezing.   Abdominal:      General: Abdomen is flat. There is no distension.      Palpations: Abdomen is soft.      Tenderness: There is no abdominal tenderness.   Musculoskeletal:         General: Swelling and tenderness present.      Cervical back: Normal range of motion and neck supple.   Skin:     Coloration: Skin is not jaundiced.      Findings: No bruising.   Neurological:      General: No focal deficit present.      Mental Status: She is alert and oriented to person, place, and time.      Cranial Nerves: No cranial nerve deficit.      Motor: No weakness.   Psychiatric:         Mood and Affect: Mood normal.         Behavior: Behavior normal.         CRANIAL NERVES     CN III, IV, VI   Pupils are equal, round, and reactive to light.     Significant Labs: All pertinent labs within the past 24 hours have been reviewed.  BMP:   Recent Labs   Lab 10/31/22  0840         K 3.6   CL 92*   CO2 34*   BUN 66*   CREATININE 5.5*   CALCIUM 8.2*       CBC:   Recent Labs   Lab 10/30/22  0744 10/30/22  2351 10/31/22  0840   WBC 11.26 10.57 9.93   HGB 8.8* 9.5* 9.4*   HCT 27.1* 28.2* 27.4*    159 144*       CMP:   Recent Labs   Lab 10/30/22  0744 10/30/22  2351 10/31/22  0840    139 141   K 4.0 3.8 3.6   CL 96 93* 92*   CO2 28 31* 34*   * 135* 106   BUN 59* 64* 66*   CREATININE 4.8* 5.4* 5.5*   CALCIUM 9.1 8.7 8.2*   PROT 5.2* 4.8* 4.5*   ALBUMIN 2.4* 2.1* 1.9*   BILITOT 3.0* 2.9* 3.2*   ALKPHOS 219* 241* 255*   * 498* 445*   * 197* 183*   ANIONGAP 17* 15 15       Lactic Acid:   Recent Labs   Lab 10/29/22  1813 10/30/22  0146 10/30/22  0744   LACTATE 5.3* 2.2 1.5         Significant Imaging: I have reviewed all pertinent imaging results/findings within the past 24 hours.

## 2022-10-31 NOTE — ASSESSMENT & PLAN NOTE
This patient does have evidence of infective focus  My overall impression is sepsis. Vital signs were reviewed and noted in progress note.  Antibiotics given-   Antibiotics (From admission, onward)    Start     Stop Route Frequency Ordered    10/30/22 2100  piperacillin-tazobactam 4.5 g in dextrose 5 % 100 mL IVPB (ready to mix system)         -- IV Every 12 hours (non-standard times) 10/30/22 1012    10/29/22 2100  rifAXIMin tablet 550 mg         -- Oral 2 times daily 10/29/22 1942    10/29/22 1245  mupirocin 2 % ointment         11/03 0859 Nasl 2 times daily 10/29/22 1140        Cultures were taken-   Microbiology Results (last 7 days)     Procedure Component Value Units Date/Time    Blood Culture #1 **CANNOT BE ORDERED STAT** [[id number]] Collected: 10/29/22 0941    Order Status: Completed Specimen: Blood from Peripheral, Hand, Right Updated: 10/30/22 1503     Blood Culture, Routine No Growth to date      No Growth to date    Blood Culture #2 **CANNOT BE ORDERED STAT** [[id number]] Collected: 10/29/22 0915    Order Status: Completed Specimen: Blood from Peripheral, Hand, Right Updated: 10/30/22 1503     Blood Culture, Routine No Growth to date      No Growth to date        Latest lactate reviewed, they are-  Recent Labs   Lab 10/29/22  1813 10/30/22  0146 10/30/22  0744   LACTATE 5.3* 2.2 1.5       Organ dysfunction indicated by Acute liver injury  Source- possible cholecyctitis     Source control Achieved by- IV Abx.    US abdomen show possible acute cholecystitis,on IV Abx ,surgery is consulted.denies abdominal pain..plan for HIDA scan,can not  have MRCP duo to AICD, and if is positive need IR consult for cholecystomy tube placement,not good surgical candidate,OAC is on hold.

## 2022-10-31 NOTE — SUBJECTIVE & OBJECTIVE
Interval History: No issues overnight. ECHO yesterday with severe cardiac dysfunction, as well as severe pulmonary HTN. No abdominal pain today. No nausea/vomiting.     Medications:  Continuous Infusions:   sodium bicarbonate drip 75 mL/hr at 10/31/22 0556     Scheduled Meds:   sodium chloride 0.9%   Intravenous Once    amLODIPine  10 mg Oral Daily    dextrose 10%  250 mL Intravenous Once    hydrALAZINE  100 mg Oral Q8H    lactulose  10 g Oral TID    metoprolol succinate  100 mg Oral Daily    mupirocin   Nasal BID    piperacillin-tazobactam (ZOSYN) IVPB  4.5 g Intravenous Q12H    rifAXImin  550 mg Oral BID    sacubitriL-valsartan  2 tablet Oral BID    sodium zirconium cyclosilicate  10 g Oral Daily     PRN Meds:sodium chloride 0.9%, acetaminophen, hydrALAZINE, labetalol, sodium chloride 0.9%     Review of patient's allergies indicates:   Allergen Reactions    Coreg [carvedilol] Other (See Comments)     Nausea/vomiting    Allopurinol      Other reaction(s): abnormal transaminases     Objective:     Vital Signs (Most Recent):  Temp: 97.1 °F (36.2 °C) (10/31/22 0700)  Pulse: 67 (10/31/22 0800)  Resp: (!) 23 (10/31/22 0800)  BP: (!) 81/56 (10/31/22 0800)  SpO2: 96 % (10/31/22 0800)   Vital Signs (24h Range):  Temp:  [97.1 °F (36.2 °C)-97.8 °F (36.6 °C)] 97.1 °F (36.2 °C)  Pulse:  [64-79] 67  Resp:  [16-26] 23  SpO2:  [93 %-98 %] 96 %  BP: ()/(39-97) 81/56     Weight: 50 kg (110 lb 3.7 oz)  Body mass index is 18.92 kg/m².    Intake/Output - Last 3 Shifts         10/29 0700  10/30 0659 10/30 0700  10/31 0659 10/31 0700  11/01 0659    P.O. 85 260     I.V. (mL/kg) 1115.3 (22.3) 1963 (39.3)     Other 500      IV Piggyback 433.5 99.7     Total Intake(mL/kg) 2133.8 (42.7) 2322.7 (46.5)     Other 1500      Stool   1    Total Output 1500  1    Net +633.8 +2322.7 -1           Stool Occurrence  10 x             Physical Exam  Vitals and nursing note reviewed.   Constitutional:       General: She is not in acute  distress.  Cardiovascular:      Rate and Rhythm: Normal rate.      Pulses: Normal pulses.   Pulmonary:      Effort: Pulmonary effort is normal. No respiratory distress.   Abdominal:      General: There is no distension.      Palpations: Abdomen is soft.      Tenderness: There is no abdominal tenderness.   Neurological:      Mental Status: She is alert.       Significant Labs:  I have reviewed all pertinent lab results within the past 24 hours.  CBC:   Recent Labs   Lab 10/31/22  0840   WBC 9.93   RBC 2.78*   HGB 9.4*   HCT 27.4*   *   MCV 99*   MCH 33.8*   MCHC 34.3     BMP:   Recent Labs   Lab 10/30/22  2351   *      K 3.8   CL 93*   CO2 31*   BUN 64*   CREATININE 5.4*   CALCIUM 8.7     CMP:   Recent Labs   Lab 10/30/22  2351   *   CALCIUM 8.7   ALBUMIN 2.1*   PROT 4.8*      K 3.8   CO2 31*   CL 93*   BUN 64*   CREATININE 5.4*   ALKPHOS 241*   *   *   BILITOT 2.9*       Significant Diagnostics:  I have reviewed all pertinent imaging results/findings within the past 24 hours.

## 2022-10-31 NOTE — ASSESSMENT & PLAN NOTE
Patient with Persistent (7 days or more) atrial fibrillation which is controlled currently with Beta Blocker. Patient is currently in sinus rhythm.[id number] Score: 1. HASBLED Score: . Anticoagulation indicated. Anticoagulation done with eliquis.  OAC on hold at this time duo to possible procedure.

## 2022-10-31 NOTE — ASSESSMENT & PLAN NOTE
57 YOF with complex medical co-morbidities, admitted for abdominal pain. General surgery consulted for transaminitis due to gallstones or possibly intrinsic liver disease.  Now with clinically resolved abdominal pain, no nausea/vomiting. Afebrile with normal WBC. Very low suspicion for acute cholecystitis. Particularly prohibitive operative risk, would not recommend cholecystectomy in this asymptomatic patient.     Will follow-up HIDA.  Patient unable MRCP due to having an AICD in place.    Would advance diet as tolerated  Serial abdominal exams  Rest of care per primary   No need from antibiotics from a general surgery perspective

## 2022-10-31 NOTE — CONSULTS
[facility] - [place] [facility]ensive [facility]  General Surgery  Progress Note    Subjective:     History of Present Illness:  57 year old female, with pertinent medical history of anemia, kidney cancer, CHF with EF of 35%,, HTN, Pafib on OAC,prediabetes, and myocardiopathy, presents to the ED to evaluate her worsening bilateral leg pain and weakness for 5 days. Patient's  states she was seen for the same symptoms 5 days ago as well as sore throat, chest pain, and nausea. She had labwork done and her results were all negative. Sore throat, chest pain, and nausea have resolved. Patient woke up this morning very weak and fatigued. She was late for dialysis and when she arrived, she was advised to come to the ED. Patient received her dialysis 2 days ago as normal. Patient was able to ambulate alone yesterday with pain, but needs support today.per ER record,  reports she has decreased appetite and is no longer producing urine. No pain medications taken today.  notes that a week ago, patient attended an event and possibly had sick contacts.       Post-Op Info:  * No surgery found *         Interval History: No issues overnight. ECHO yesterday with severe cardiac dysfunction, as well as severe pulmonary HTN. No abdominal pain today. No nausea/vomiting. WBC 9(10)    Medications:  Continuous Infusions:   sodium bicarbonate drip 75 mL/hr at 10/31/22 0556     Scheduled Meds:   sodium chloride 0.9%   Intravenous Once    amLODIPine  10 mg Oral Daily    dextrose 10%  250 mL Intravenous Once    hydrALAZINE  100 mg Oral Q8H    lactulose  10 g Oral TID    metoprolol succinate  100 mg Oral Daily    mupirocin   Nasal BID    piperacillin-tazobactam (ZOSYN) IVPB  4.5 g Intravenous Q12H    rifAXImin  550 mg Oral BID    sacubitriL-valsartan  2 tablet Oral BID    sodium zirconium cyclosilicate  10 g Oral Daily     PRN Meds:sodium chloride 0.9%, acetaminophen, hydrALAZINE, labetalol, sodium chloride 0.9%     Review of patient's allergies  indicates:   Allergen Reactions    Coreg [carvedilol] Other (See Comments)     Nausea/vomiting    Allopurinol      Other reaction(s): abnormal transaminases     Objective:     Vital Signs (Most Recent):  Temp: 97.1 °F (36.2 °C) (10/31/22 0700)  Pulse: 67 (10/31/22 0800)  Resp: (!) 23 (10/31/22 0800)  BP: (!) 81/56 (10/31/22 0800)  SpO2: 96 % (10/31/22 0800)   Vital Signs (24h Range):  Temp:  [97.1 °F (36.2 °C)-97.8 °F (36.6 °C)] 97.1 °F (36.2 °C)  Pulse:  [64-79] 67  Resp:  [16-26] 23  SpO2:  [93 %-98 %] 96 %  BP: ()/(39-97) 81/56     Weight: 50 kg (110 lb 3.7 oz)  Body mass index is 18.92 kg/m².    Intake/Output - Last 3 Shifts         10/29 0700  10/30 0659 10/30 0700  10/31 0659 10/31 0700 11/01 0659    P.O. 85 260     I.V. (mL/kg) 1115.3 (22.3) 1963 (39.3)     Other 500      IV Piggyback 433.5 99.7     Total Intake(mL/kg) 2133.8 (42.7) 2322.7 (46.5)     Other 1500      Stool   1    Total Output 1500  1    Net +633.8 +2322.7 -1           Stool Occurrence  10 x             Physical Exam  Vitals and nursing note reviewed.   Constitutional:       General: She is not in acute distress.  Cardiovascular:      Rate and Rhythm: Normal rate.      Pulses: Normal pulses.   Pulmonary:      Effort: Pulmonary effort is normal. No respiratory distress.   Abdominal:      General: There is no distension.      Palpations: Abdomen is soft.      Tenderness: There is no abdominal tenderness.   Neurological:      Mental Status: She is alert.       Significant Labs:  I have reviewed all pertinent lab results within the past 24 hours.  CBC:   Recent Labs   Lab 10/31/22  0840   WBC 9.93   RBC 2.78*   HGB 9.4*   HCT 27.4*   *   MCV 99*   MCH 33.8*   MCHC 34.3     BMP:   Recent Labs   Lab 10/30/22  2351   *      K 3.8   CL 93*   CO2 31*   BUN 64*   CREATININE 5.4*   CALCIUM 8.7     CMP:   Recent Labs   Lab 10/30/22  2351   *   CALCIUM 8.7   ALBUMIN 2.1*   PROT 4.8*      K 3.8   CO2 31*   CL 93*   BUN  64*   CREATININE 5.4*   ALKPHOS 241*   *   *   BILITOT 2.9*       Significant Diagnostics:  I have reviewed all pertinent imaging results/findings within the past 24 hours.    Assessment/Plan:     Transaminitis  57 YOF with CHF(EF 25%, pulm HTN 60), kidney cancer, HTN, Afib(on Eliquis), ESRD (on dialysis) admitted for abdominal pain. General surgery consulted for transaminitis due to gallstones or possibly intrinsic liver disease.  Now with clinically resolved abdominal pain, no nausea/vomiting. Afebrile with normal WBC. Very low suspicion for acute cholecystitis. Particularly prohibitive operative risk, poor candidate for cholecystectomy.     -Will follow-up HIDA. If acute cholecystitis, recommend IR for cholecystostomy tube placement  -Patient unable MRCP due to having an AICD in place.    -Would advance diet as tolerated  -Serial abdominal exams  -Rest of care per primary            Greta Mckeon MD  General Surgery  [facility] - [place], [place] - Intensive Care

## 2022-10-31 NOTE — NURSING
[facility] [place] [facility]ensive [facility]  ICU Shift Summary  Date: 10/31/2022      Prehospitalization: Home  Admit Date / LOS : 10/29/2022/ 2 days    Diagnosis: Sepsis    Consults:        Active: Cardio, GI, Gen Surg, Nephro, OT, Palliative, PT, and Wound Care       Needed: N/A     Code Status: Full Code   Advanced Directive: <no information>    LDA:  Lines/Drains/Airways       Central Venous Catheter Line  Duration             Percutaneous Central Line Insertion/Assessment - Triple Lumen  10/29/22 2139 left internal jugular 1 day              Drain  Duration                  Hemodialysis AV Fistula Left upper arm -- days              Peripheral Intravenous Line  Duration                  Peripheral IV - Single Lumen 10/29/22 1430 20 G Anterior;Distal;Right Forearm 2 days                  Central Lines/Site/Justification:Unable to Obtain/Maintain PIV  Urinary Cath/Order/Justification:Patient Does Not Have Urinary Catheter    Vasopressors/Infusions:        GOALS: Volume/ Hemodynamic: N/A                     RASS: -1  awakes to voice (eye opening/contact) > 10 seconds    Pain Management: none       Pain Controlled: yes     Rhythm: NSR    Respiratory Device: Room Air                  Most Recent SBT/ SAT: N/A       MOVE Screen: PT Consult  ICU Liberation: no    VTE Prophylaxis: Mechanical  Mobility: Bedrest  Stress Ulcer Prophylaxis: No    Isolation: No active isolations    Dietary:   Current Diet Order   Procedures    Diet renal      Tolerance: not applicable  Advancement: no    I & O (24h):    Intake/Output Summary (Last 24 hours) at 10/31/2022 1813  Last data filed at 10/31/2022 1600  Gross per 24 hour   Intake 2082.53 ml   Output 1 ml   Net 2081.53 ml        Restraints: No    Significant Dates:  Post Op Date: N/A  Rescue Date: N/A  Imaging/ Diagnostics:  see imaging    Noteworthy Labs:  AST, ALT    COVID Test: (--)  CBC/Anemia Labs: Coags:    Recent Labs   Lab 10/29/22  1800 10/29/22  2237 10/30/22  0744 10/30/22  2351  10/31/22  0840   WBC  --    < > 11.26 10.57 9.93   HGB  --    < > 8.8* 9.5* 9.4*   HCT  --    < > 27.1* 28.2* 27.4*   PLT  --    < > 206 159 144*   MCV  --    < > 100* 99* 99*   RDW  --    < > 20.7* 21.2* 21.3*   IRON  --   --  73  --  48   FERRITIN  --   --  2,411*  --   --    FOLATE 16.8  --   --   --   --    [id number] >2000*  --   --   --   --     < > = values in this interval not displayed.    No results for input(s): PT, INR, APTT in the last 168 hours.     Chemistries:   Recent Labs   Lab 10/29/22  1813 10/29/22  2237 10/30/22  2351 10/31/22  0840      < > 139 141   K 3.9   < > 3.8 3.6   CL 95   < > 93* 92*   CO2 21*   < > 31* 34*   BUN 29*   < > 64* 66*   CREATININE 2.6*   < > 5.4* 5.5*   CALCIUM 9.9   < > 8.7 8.2*   PROT 8.6*   < > 4.8* 4.5*   BILITOT 3.7*   < > 2.9* 3.2*   ALKPHOS 341*   < > 241* 255*   *   < > 197* 183*   *   < > 498* 445*   PHOS 3.5  --   --   --     < > = values in this interval not displayed.        Cardiac Enzymes: Ejection Fractions:    Recent Labs     10/29/22  0828 10/29/22  1335   TROPONINI 2.896* 3.418*    EF   Date Value Ref Range Status   10/30/2022 25 % Final        POCT Glucose: HbA1c:    Recent Labs   Lab 10/29/22  0900 10/29/22  1106 10/29/22  1706   POCTGLUCOSE 86 99 113*    Hemoglobin A1C   Date Value Ref Range Status   10/22/2022 4.8 4.0 - 5.6 % Final     Comment:     ADA Screening Guidelines:  5.7-6.4%  Consistent with prediabetes  >or=6.5%  Consistent with diabetes    High levels of fetal hemoglobin interfere with the HbA1C  assay. Heterozygous hemoglobin variants (HbS, HgC, etc)do  not significantly interfere with this assay.   However, presence of multiple variants may affect accuracy.             ICU LOS 2d 7h  Level of Care: Critical Care    Chart Check: 24 HR Done  Shift Summary/Plan for the shift: SEE CARE PLAN NOTE

## 2022-10-31 NOTE — CONSULTS
"[facility] - Intensive Care  Palliative Medicine  Consult Note    Patient Name: Eva Bloom  MRN: [id number]  Admission Date: 10/29/2022  Hospital Length of Stay: 2 days  Code Status: Full Code   Attending Provider: José Miguel Cristina MD  Consulting Provider: Leila Munoz NP  Primary Care Physician: Sowmya Mccain MD  Principal Problem:Sepsis    Patient information was obtained from spouse/SO, past medical records, ER records and primary team.      Inpatient consult to Palliative Care  Consult performed by: Liela Munoz NP  Consult ordered by: José Miguel Cristina MD  Reason for consult: goals of care and advanced care planning         Assessment/Plan:   Advance Care Planning   Palliative Consult  Date: 10/31/2022  - consult received   - interval chart reviewed in detail   - discussed pt during ICU MDT rounds  - met with patient and , Otis, at ICU bedside; introduction to palliative medicine team and role in current care and admission   - pt very lethargic today post HIDA scan and did not actively engage/participate in today's assessment and discussion, allowed to rest with plan for continued GOC/ACP discission with pt when more awake  - today's GOC/ACP discussion took place with pt's ; he shared more about pt outside of hospital; recognizes pt's increased need for hospital care recently and AMS associated with "being ill" but reports when "well" is active and more alert/oriented   -  seems to have good insight into pt's multiple life-limiting conditions including ESRD and CHF;  sites pt's age as main reason for desire for continued aggressive treatment of all reversible symptoms/conditions and desire for full code and resuscitation; associated risks and potential outcomes of aggressive resuscitation measures discussed;  advocates that "she is too young we at least have to try" but if no potential for recovery would not wish for prolonged life support for pt "   - pt's lunch tray arrived after NPO status, and pt requiring full assistance with meals currently per Otis, allowed time for this with plan for continued GOC/ACP discussion when pt able to participate more   - emotional support provided   - Allowed time for questions/concerns; all addressed; expressed availability of myself/palliative team for additional questions/concerns     GOC  I engaged the family,  Otis, in a conversation about advance care planning and we specifically addressed what the goals of care would be moving forward, in light of the patient's change in clinical status, specifically ESRD and CHF.  Pt very lethargic today, will planned for continued GOC discussion with pt once able.  We did specifically address the patient's likely prognosis, which is poor.  We explored the patient's values and preferences for future care.  The family endorses that what is most important right now is to focus on symptom/pain control, extending life as long as possible, even it it means sacrificing quality and curative/life-prolongation (regardless of treatment burdens)    Accordingly, we have decided that the best plan to meet the patient's goals includes continuing with treatment    I did not explain the role for hospice care at this stage of the patient's illness, including its ability to help the patient live with the best quality of life possible, as it does not currently align with 's GOC, but will plan to discuss further as future option in care.  We will not be making a hospice referral.    Sepsis  Cholecystitis   - possibly related to cholecystitis; HIDA scan performed today   - IV abx initiated per primary   - if cholecystitis confirmed  advocated for GOC of surgery to treat if offered   - general surgery consulted/following   - noted; management per primary and general surgery     Chronic combined systolic and diastolic congestive heart failure  - new echo since admit showing worsening  "EF, now 35% from previous 35% 6/2022  - cardiology consulted/following   - will plan for further symptom burden and trajectory of disease discussion with pt and  when pt more awake   - noted; management per primary and cardiology     Chronic kidney disease-mineral and bone disorder  Anemia in ESRD (end-stage renal disease)  S/p nephrectomy left  - on HD; wishes to continue as long as possible per    - family condition; son is in 30s and also on HD; pt previously evaluated for transplant per , but pt decided against due to chronic conditions   - nephrology consulted/following   - noted; management per primary and nephrology     Pulmonary hypertension  Cardiomyopathy, nonischemic  PAF (paroxysmal atrial fibrillation)  ICD (implantable cardioverter-defibrillator) in place - SubQ  History of kidney cancer  - Chronic histories noted; management per primary     Thank you for your consult. I will follow-up with patient. Please contact us if you have any additional questions.    Subjective:     HPI:   From H&P: "57 year old female, with pertinent medical history of anemia, kidney cancer, CHF with EF of 35%,, HTN, Pafib on OAC,prediabetes, and myocardiopathy, presents to the ED to evaluate her worsening bilateral leg pain and weakness for 5 days. Patient's  states she was seen for the same symptoms 5 days ago as well as sore throat, chest pain, and nausea. She had labwork done and her results were all negative. Sore throat, chest pain, and nausea have resolved. Patient woke up this morning very weak and fatigued. She was late for dialysis and when she arrived, she was advised to come to the ED. Patient received her dialysis 2 days ago as normal. Patient was able to ambulate alone yesterday with pain, but needs support today.per ER record,  reports she has decreased appetite and is no longer producing urine. No pain medications taken today.  notes that a week ago, patient attended an " "event and possibly had sick contacts. Patient denies congestion, cough, trauma to legs,or other associated symptoms,patient is hypothermic in ER 93%,has elevated lactic acid,more than 12,has leucocytosis 14,patient is septic,can not gave IVF duo to  ESRD,has been  started on broad spectrum IV Abx.he has severe acidosis C O 2 of 8 and elevated Troponin 2.8,she denies chest apian dn abdominal pain,only complain is leg pain.she has also Hyperkalemia 6.0"    Palliative medicine consulted for advance care planning and continuity of care; Met with pt at bedside; for details of visit, see advance care planning section of plan.         Hospital Course:  No notes on file    Past Medical History:   Diagnosis Date    Anemia     Bronchitis 03/01/2017    Cancer 2016    kidney cancer    CHF (congestive heart failure), NYHA class II, chronic, systolic     CMV (cytomegalovirus) antibody positive     Essential hypertension 9/23/2015    H/O herpes simplex type 2 infection     Herpes simplex type 1 antibody positive     History of kidney cancer     s/p left nephrectomy 1/2016    Hyperparathyroidism, unspecified     Hyperuricemia without signs of inflammatory arthritis and tophaceous disease     Kidney stones     LGSIL (low grade squamous intraepithelial dysplasia)     Myocardiopathy 7/21/2017    Prediabetes     Proteinuria     Renal disorder     Thyroid nodule     Urate nephropathy        Past Surgical History:   Procedure Laterality Date    BREAST CYST EXCISION      COLONOSCOPY N/A 11/12/2015    COLONOSCOPY N/A 3/12/2021    Procedure: COLONOSCOPY;  Surgeon: Brendon Lanier MD;  Location: [facility];  Service: Endoscopy;  Laterality: N/A;  covid test 3/9, labs prior, prep instr mailed -ml    INSERTION OF BIVENTRICULAR IMPLANTABLE CARDIOVERTER-DEFIBRILLATOR (ICD)  04/2021    NEPHRECTOMY-LAPAROSCOPIC Left 01/12/2016    PERITONEAL CATHETER INSERTION      Permacath insertion  01/12/2017    SALPINGOOPHORECTOMY " Right 2016    KJB---GENE    TONSILLECTOMY      TUBAL LIGATION         Review of patient's allergies indicates:   Allergen Reactions    Coreg [carvedilol] Other (See Comments)     Nausea/vomiting    Allopurinol      Other reaction(s): abnormal transaminases       Medications:  Continuous Infusions:  Scheduled Meds:   sodium chloride 0.9%   Intravenous Once    amLODIPine  10 mg Oral Daily    dextrose 10%  250 mL Intravenous Once    epoetin david-epbx  50 Units/kg Subcutaneous Every Mon, Wed, Fri    hydrALAZINE  100 mg Oral Q8H    lactulose  10 g Oral TID    metoprolol succinate  100 mg Oral Daily    mupirocin   Nasal BID    piperacillin-tazobactam (ZOSYN) IVPB  4.5 g Intravenous Q12H    rifAXImin  550 mg Oral BID    sacubitriL-valsartan  2 tablet Oral BID     PRN Meds:sodium chloride 0.9%, acetaminophen, hydrALAZINE, labetalol, sodium chloride 0.9%    Family History       Problem Relation (Age of Onset)    Diabetes Father, Maternal Grandfather    Heart disease Sister    Hypertension Mother    Kidney disease Father    No Known Problems Son, Son, Sister, Brother, Maternal Grandmother, Paternal Grandmother, Paternal Grandfather, Sister, Brother, Maternal Aunt, Maternal Uncle, Paternal Aunt, Paternal Uncle          Tobacco Use    Smoking status: Never    Smokeless tobacco: Never   Substance and Sexual Activity    Alcohol use: No     Comment: . 2 children. works at [employer].    Drug use: No    Sexual activity: Yes     Partners: Male       Review of Systems   Unable to perform ROS: Other (lethargic/not talkative; sleeping (met with pt's ))   Objective:     Vital Signs (Most Recent):  Temp: 97.5 °F (36.4 °C) (10/31/22 1100)  Pulse: 69 (10/31/22 1400)  Resp: (!) 21 (10/31/22 1400)  BP: (!) 143/67 (10/31/22 1400)  SpO2: (!) 93 % (10/31/22 1400)   Vital Signs (24h Range):  Temp:  [97.1 °F (36.2 °C)-97.8 °F (36.6 °C)] 97.5 °F (36.4 °C)  Pulse:  [64-74] 69  Resp:  [13-27] 21  SpO2:  [93 %-98 %]  93 %  BP: ()/(39-97) 143/67     Weight: 50 kg (110 lb 3.7 oz)  Body mass index is 18.92 kg/m².    Physical Exam  Constitutional:       Comments: Lethargic/resting (post HIDA scan)   HENT:      Head: Normocephalic and atraumatic.      Nose: Nose normal.   Pulmonary:      Effort: Pulmonary effort is normal. No respiratory distress.   Musculoskeletal:         General: Swelling present.      Right lower leg: No edema.      Left lower leg: No edema.   Neurological:      Comments: Unable to asses; pt would not awaken for visit       Significant Labs: All pertinent labs within the past 24 hours have been reviewed.    CBC:   Recent Labs   Lab 10/31/22  0840   WBC 9.93   HGB 9.4*   HCT 27.4*   MCV 99*   *     BMP:  Recent Labs   Lab 10/31/22  0840         K 3.6   CL 92*   CO2 34*   BUN 66*   CREATININE 5.5*   CALCIUM 8.2*     LFT:  Lab Results   Component Value Date     (H) 10/31/2022     (H) 10/12/2006    ALKPHOS 255 (H) 10/31/2022    BILITOT 3.2 (H) 10/31/2022     Albumin:   Albumin   Date Value Ref Range Status   10/31/2022 1.9 (L) 3.5 - 5.2 g/dL Final     Protein:   Total Protein   Date Value Ref Range Status   10/31/2022 4.5 (L) 6.0 - 8.4 g/dL Final     Lactic acid:   Lab Results   Component Value Date    LACTATE 1.5 10/30/2022    LACTATE 2.2 10/30/2022       Significant Imaging: I have reviewed all pertinent imaging results/findings within the past 24 hours.        Total visit time: 70 minutes    > 50% of  70  min visit spent in chart review, face to face discussion of symptom assessment, coordination of care with other specialists, and discharge planning.    ACP time spent included in initial visit time: goals of care, emotional support, formulating and communicating prognosis, exploring burden/ benefit of various approaches of treatment.     Leila Munoz NP  Palliative Medicine  [facility] - Intensive Care

## 2022-10-31 NOTE — NURSING
[facility] - [place]  Nurses Note -- 4 Eyes      10/31/2022       Skin assessed on: 1100      [] No Pressure Injuries Present    []Prevention Measures Documented    [x] Yes LDA  for Pressure Injury Previously documented     [] Yes New Pressure Injury Discovered   [x] LDA for New Pressure Injury Added      Attending RN:  Kristina Blankenship RN     Second RN: Aleah Pizarro RN

## 2022-10-31 NOTE — CONSULTS
[facility] [place] [facility] Consultation [facility]    Reason for consult: transaminitis    PCP:   Sowmya Mccain       LOS: 2        Initial History of Present Illness (HPI):  This is a 57 y.o. female consulted to GI service for transaminitis. Patient lethargic and unable to hold conversation for history interview. Some information from , most from chart.  Patient with acute onset of generalized weakness 3 days ago.  reports she has decreased appetite and is no longer producing urine.  notes that a week ago, patient attended an event and possibly had sick contacts.   Lab results with elevated lfts, bili, alk phos. US of abdomen shows cholelithiasis with cholecystitis, hepatic lesion with minimal flow.  Today lethargic, yellowed sclera, tolerating regular diet. S/p HIDA scan, awaiting results.       Review of Systems   Constitutional:  Positive for activity change, appetite change and fatigue. Negative for chills, diaphoresis and fever.   HENT:  Negative for congestion, drooling, rhinorrhea, sore throat and trouble swallowing.    Eyes:  Negative for discharge.   Respiratory:  Negative for cough, shortness of breath and wheezing.    Cardiovascular:  Negative for chest pain, palpitations and leg swelling.   Gastrointestinal:  Negative for abdominal distention, abdominal pain, anal bleeding, blood in stool, constipation, diarrhea, nausea, rectal pain and vomiting.   Endocrine: Negative for cold intolerance and heat intolerance.   Genitourinary:  Negative for frequency, hematuria and urgency.   Musculoskeletal:  Negative for arthralgias.   Skin:  Negative for color change, pallor and rash.   Neurological:  Positive for weakness. Negative for syncope, facial asymmetry and numbness.   Psychiatric/Behavioral:  Negative for agitation and confusion. The patient is not nervous/anxious.          Medical History:  has a past medical history of Anemia, Bronchitis (03/01/2017), Cancer (2016), CHF (congestive  heart failure), NYHA class II, chronic, systolic, CMV (cytomegalovirus) antibody positive, Essential hypertension (9/23/2015), H/O herpes simplex type 2 infection, Herpes simplex type 1 antibody positive, History of kidney cancer, Hyperparathyroidism, unspecified, Hyperuricemia without signs of inflammatory arthritis and tophaceous disease, Kidney stones, LGSIL (low grade squamous intraepithelial dysplasia), Myocardiopathy (7/21/2017), Prediabetes, Proteinuria, Renal disorder, Thyroid nodule, and Urate nephropathy.    Surgical History:  has a past surgical history that includes Tonsillectomy; Tubal ligation; Colonoscopy (N/A, 11/12/2015); Nephrectomy-Laparoscopic-Donor (Left, 01/12/2016); Peritoneal catheter insertion; Salpingoophorectomy (Right, 2016); Permacath insertion (01/12/2017); Breast cyst excision; Colonoscopy (N/A, 3/12/2021); and Insertion of biventricular implantable cardioverter-defibrillator (ICD) (04/2021).    Family History: family history includes Diabetes in her father and maternal grandfather; Heart disease in her sister; Hypertension in her mother; Kidney disease in her father; No Known Problems in her brother, brother, maternal aunt, maternal grandmother, maternal uncle, paternal aunt, paternal grandfather, paternal grandmother, paternal uncle, sister, sister, son, and son..     Social History:  reports that she has never smoked. She has never used smokeless tobacco. She reports that she does not drink alcohol and does not use drugs.    Review of patient's allergies indicates:   Allergen Reactions    Coreg [carvedilol] Other (See Comments)     Nausea/vomiting    Allopurinol      Other reaction(s): abnormal transaminases       No current facility-administered medications on file prior to encounter.     Current Outpatient Medications on File Prior to Encounter   Medication Sig Dispense Refill    acetaminophen (TYLENOL) 500 MG tablet Take 500 mg by mouth every 6 (six) hours as needed for Pain.       apixaban (ELIQUIS) 2.5 mg Tab Take 1 tablet (2.5 mg total) by mouth 2 (two) times daily. 60 tablet 11    CABOMETYX 60 mg Tab TAKE ONE TABLET BY MOUTH ONCE DAILY AT THE SAME TIME ON AN EMPTY STOMACH AT LEAST 1 HOUR BEFORE OR 2 HOURS AFTER EATING. AVOID GRAPEFRUIT PRODUCTS 30 tablet 4    cloNIDine 0.3 mg/24 hr td ptwk (CATAPRES) 0.3 mg/24 hr Place 1 patch onto the skin every 7 days. 4 patch 11    epoetin david-epbx (RETACRIT INJ) Epoetin david - epbx (Retacrit)      hydrALAZINE (APRESOLINE) 100 MG tablet Take 1 tablet (100 mg total) by mouth every 12 (twelve) hours. 180 tablet 2    lidocaine-prilocaine (EMLA) cream APPLY ATLEAST 30 MINUTES BEFORE TREATMENT 3 TIMES A WEEK 30 g 11    metoprolol succinate (TOPROL-XL) 100 MG 24 hr tablet Take 1 tablet (100 mg total) by mouth once daily. 90 tablet 0    mv,Ca,min-folic acid-vit K1 (ONE-A-DAY WOMEN'S 50 PLUS) 400-20 mcg Tab Take 1 tablet by mouth.      ondansetron (ZOFRAN-ODT) 8 MG TbDL Take 1 tablet (8 mg total) by mouth every 12 (twelve) hours as needed. 30 tablet 1    oxyCODONE-acetaminophen (PERCOCET) 5-325 mg per tablet Take 1 tablet by mouth every 6 (six) hours as needed for Pain. 30 each 0    oxyCODONE-acetaminophen (PERCOCET) 5-325 mg per tablet Take 1 tablet by mouth every 4 (four) hours as needed. 18 tablet 0    promethazine (PHENERGAN) 25 MG tablet Take 1 tablet (25 mg total) by mouth every 6 (six) hours as needed for Nausea. 15 tablet 0    sacubitriL-valsartan (ENTRESTO) 49-51 mg per tablet Take 1 tablet by mouth 2 (two) times daily. 60 tablet 11    sevelamer carbonate (RENVELA) 800 mg Tab TAKE 2 TABLETS (1,600 MG TOTAL) BY MOUTH 3 (THREE) TIMES DAILY WITH MEALS. (Patient not taking: Reported on 10/26/2022) 540 tablet 0    sodium chloride 0.9% SolP 100 mL with iron sucrose 100 mg iron/5 mL Soln 100 mg 50 mg.      [DISCONTINUED] amLODIPine (NORVASC) 5 MG tablet TAKE 1 TABLET BY MOUTH EVERY DAY 90 tablet 3        Objective Findings:    Vital Signs:  Temp:  [95.6  °F (35.3 °C)-97.8 °F (36.6 °C)]   Pulse:  [64-73]   Resp:  [13-27]   BP: ()/(39-97)   SpO2:  [93 %-98 %]   Body mass index is 18.92 kg/m².      Physical Exam  Vitals and nursing note reviewed.   Constitutional:       General: She is sleeping.      Appearance: She is ill-appearing and toxic-appearing.   HENT:      Head: Normocephalic.      Nose: Nose normal.      Mouth/Throat:      Mouth: Mucous membranes are moist.   Eyes:      General: Scleral icterus present.      Pupils: Pupils are equal, round, and reactive to light.   Cardiovascular:      Heart sounds: Normal heart sounds.   Pulmonary:      Effort: Pulmonary effort is normal.      Breath sounds: Normal breath sounds.   Abdominal:      General: Bowel sounds are normal. There is no distension.      Palpations: Abdomen is soft.      Tenderness: There is no abdominal tenderness.   Musculoskeletal:         General: Normal range of motion.      Cervical back: Normal range of motion.   Skin:     Capillary Refill: Capillary refill takes less than 2 seconds.   Neurological:      General: No focal deficit present.      Mental Status: She is lethargic.   Psychiatric:         Attention and Perception: She is inattentive.         Behavior: Behavior normal.         Thought Content: Thought content normal.             Labs:  Lab Results   Component Value Date    WBC 9.93 10/31/2022    HGB 9.4 (L) 10/31/2022    HCT 27.4 (L) 10/31/2022     (L) 10/31/2022    CHOL 101 (L) 10/22/2022    TRIG 97 10/22/2022    HDL 35 (L) 10/22/2022     (H) 10/31/2022     (H) 10/31/2022     10/31/2022    K 3.6 10/31/2022    CL 92 (L) 10/31/2022    CREATININE 5.5 (H) 10/31/2022    BUN 66 (H) 10/31/2022    CO2 34 (H) 10/31/2022    TSH 5.286 (H) 10/29/2022    INR 1.1 09/24/2022    HGBA1C 4.8 10/22/2022             Imaging: 10/29 US abdomen-Gallstones.  Thick-walled gallbladder.  Positive sonographic Sloan sign.  Abdominal ascites.  The possibility of acute cholecystitis  should be considered.  1.3 x 0.8 x 1.3 cm left hepatic lesion with minimal peripheral blood flow.  Recommend correlation with MRI of the abdomen from 12/28/2020  Multiple right renal cyst or polycystic kidney.      Endoscopy: 3/2021 Colonoscopy- - One 3 mm polyp in the sigmoid colon, removed with  a jumbo cold forceps. Resected and retrieved.                        - Non-bleeding external and internal hemorrhoids.                        - The examination was otherwise normal on direct and                        retroflexion views.     I have independently reviewed and interpreted the imaging above    Assessment:  Patient is a .57 y.o. y/o .female with  has a past medical history of Anemia, Bronchitis (03/01/2017), Cancer (2016), CHF (congestive heart failure), NYHA class II, chronic, systolic, CMV (cytomegalovirus) antibody positive, Essential hypertension (9/23/2015), H/O herpes simplex type 2 infection, Herpes simplex type 1 antibody positive, History of kidney cancer, Hyperparathyroidism, unspecified, Hyperuricemia without signs of inflammatory arthritis and tophaceous disease, Kidney stones, LGSIL (low grade squamous intraepithelial dysplasia), Myocardiopathy (7/21/2017), Prediabetes, Proteinuria, Renal disorder, Thyroid nodule, and Urate nephropathy. Consulted to the GI service for transaminitis.               Recommendations:  1. Transaminitis. Elevated alk phos. Elevated bili. Cholelithiasis with cystitis. Liver lesion.Polycystic kidney disease. Metastatic renal cell carcinoma, s/p nephrectomy.EF 25%.    Bili, alk phos, lft elevation could be in relation to gallbladder presentation. HIDA scan in process. Will follow result. Surgery following, plan for drain pending result. Agree with plan. Follow levels.     Liver lesion on current US, previous CT from 2021 showing liver lesions or cysts, mets from renal cancer? Await HIDA resuls to further f/u.      Thank you so much for allowing us to participate in the  care of Eva Bloom . Please contact us if you have any additional questions.    Diana Christian NP  Gastroenterology  [facility]

## 2022-10-31 NOTE — NURSING
Patient back from Hida Scan and complaints of wanting to eat - informed to remain NPO till Hida Scan completed - still needs another image -

## 2022-10-31 NOTE — SUBJECTIVE & OBJECTIVE
Past Medical History:   Diagnosis Date    Anemia     Bronchitis 03/01/2017    Cancer 2016    kidney cancer    CHF (congestive heart failure), NYHA class II, chronic, systolic     CMV (cytomegalovirus) antibody positive     Essential hypertension 9/23/2015    H/O herpes simplex type 2 infection     Herpes simplex type 1 antibody positive     History of kidney cancer     s/p left nephrectomy 1/2016    Hyperparathyroidism, unspecified     Hyperuricemia without signs of inflammatory arthritis and tophaceous disease     Kidney stones     LGSIL (low grade squamous intraepithelial dysplasia)     Myocardiopathy 7/21/2017    Prediabetes     Proteinuria     Renal disorder     Thyroid nodule     Urate nephropathy        Past Surgical History:   Procedure Laterality Date    BREAST CYST EXCISION      COLONOSCOPY N/A 11/12/2015    COLONOSCOPY N/A 3/12/2021    Procedure: COLONOSCOPY;  Surgeon: Brendon Lanier MD;  Location: [facility];  Service: Endoscopy;  Laterality: N/A;  covid test 3/9, labs prior, prep instr mailed -ml    INSERTION OF BIVENTRICULAR IMPLANTABLE CARDIOVERTER-DEFIBRILLATOR (ICD)  04/2021    NEPHRECTOMY-LAPAROSCOPIC Left 01/12/2016    PERITONEAL CATHETER INSERTION      Permacath insertion  01/12/2017    SALPINGOOPHORECTOMY Right 2016    KJB---DAVINCI    TONSILLECTOMY      TUBAL LIGATION         Review of patient's allergies indicates:   Allergen Reactions    Coreg [carvedilol] Other (See Comments)     Nausea/vomiting    Allopurinol      Other reaction(s): abnormal transaminases       Medications:  Continuous Infusions:  Scheduled Meds:   sodium chloride 0.9%   Intravenous Once    amLODIPine  10 mg Oral Daily    dextrose 10%  250 mL Intravenous Once    epoetin david-epbx  50 Units/kg Subcutaneous Every Mon, Wed, Fri    hydrALAZINE  100 mg Oral Q8H    lactulose  10 g Oral TID    metoprolol succinate  100 mg Oral Daily    mupirocin   Nasal BID    piperacillin-tazobactam (ZOSYN) IVPB  4.5 g Intravenous Q12H     rifAXImin  550 mg Oral BID    sacubitriL-valsartan  2 tablet Oral BID     PRN Meds:sodium chloride 0.9%, acetaminophen, hydrALAZINE, labetalol, sodium chloride 0.9%    Family History       Problem Relation (Age of Onset)    Diabetes Father, Maternal Grandfather    Heart disease Sister    Hypertension Mother    Kidney disease Father    No Known Problems Son, Son, Sister, Brother, Maternal Grandmother, Paternal Grandmother, Paternal Grandfather, Sister, Brother, Maternal Aunt, Maternal Uncle, Paternal Aunt, Paternal Uncle          Tobacco Use    Smoking status: Never    Smokeless tobacco: Never   Substance and Sexual Activity    Alcohol use: No     Comment: . 2 children. works at [employer].    Drug use: No    Sexual activity: Yes     Partners: Male       Review of Systems   Unable to perform ROS: Other (lethargic/not talkative; sleeping (met with pt's ))   Objective:     Vital Signs (Most Recent):  Temp: 97.5 °F (36.4 °C) (10/31/22 1100)  Pulse: 69 (10/31/22 1400)  Resp: (!) 21 (10/31/22 1400)  BP: (!) 143/67 (10/31/22 1400)  SpO2: (!) 93 % (10/31/22 1400)   Vital Signs (24h Range):  Temp:  [97.1 °F (36.2 °C)-97.8 °F (36.6 °C)] 97.5 °F (36.4 °C)  Pulse:  [64-74] 69  Resp:  [13-27] 21  SpO2:  [93 %-98 %] 93 %  BP: ()/(39-97) 143/67     Weight: 50 kg (110 lb 3.7 oz)  Body mass index is 18.92 kg/m².    Physical Exam  Constitutional:       Comments: Lethargic/resting (post HIDA scan)   HENT:      Head: Normocephalic and atraumatic.      Nose: Nose normal.   Pulmonary:      Effort: Pulmonary effort is normal. No respiratory distress.   Musculoskeletal:         General: Swelling present.      Right lower leg: No edema.      Left lower leg: No edema.   Neurological:      Comments: Unable to asses; pt would not awaken for visit       Significant Labs: All pertinent labs within the past 24 hours have been reviewed.    CBC:   Recent Labs   Lab 10/31/22  0840   WBC 9.93   HGB 9.4*   HCT 27.4*   MCV 99*   PLT  144*     BMP:  Recent Labs   Lab 10/31/22  0840         K 3.6   CL 92*   CO2 34*   BUN 66*   CREATININE 5.5*   CALCIUM 8.2*     LFT:  Lab Results   Component Value Date     (H) 10/31/2022     (H) 10/12/2006    ALKPHOS 255 (H) 10/31/2022    BILITOT 3.2 (H) 10/31/2022     Albumin:   Albumin   Date Value Ref Range Status   10/31/2022 1.9 (L) 3.5 - 5.2 g/dL Final     Protein:   Total Protein   Date Value Ref Range Status   10/31/2022 4.5 (L) 6.0 - 8.4 g/dL Final     Lactic acid:   Lab Results   Component Value Date    LACTATE 1.5 10/30/2022    LACTATE 2.2 10/30/2022       Significant Imaging: I have reviewed all pertinent imaging results/findings within the past 24 hours.

## 2022-10-31 NOTE — PROVIDER TRANSFER
57 year old female, with pertinent medical history of anemia, kidney cancer, CHF with EF of 35%,, HTN, Pafib on OAC,prediabetes, and myocardiopathy, presents to the ED to evaluate her worsening bilateral leg pain and weakness for 5 days. Patient's  states she was seen for the same symptoms 5 days ago as well as sore throat, chest pain, and nausea. She had labwork done and her results were all negative. Sore throat, chest pain, and nausea have resolved. patient was  hypothermic in ER 93%,improved with bear hug,has elevated lactic acid,more than 12,improved with bicarb drip and HD,has leucocytosis 14,patient is septic,can not giving IVF duo to  ESRD,has been  started on broad spectrum IV Abx.he has severe acidosis C O 2 of 8 ,improved with HD and bicarb drip,and elevated Troponin 2.8,cardiology is consulted,echo. Show EF of 25%,she denies chest pain,no plan for intervention at this time.  US abdomen show possible acute cholecystitis,on IV Abx ,surgery is consulted.denies abdominal pain.plan for HIDA scan and if is positive need IR consult for cholecystomy tube placement,not good surgical candidate,OAC is on hold.  Has transaminitis with elevated ammonia,hepatitis panel is negative,on lactulose.ammonal level is improved,consulted GI.  Hyperkalemia 6.0.with HD is improved.  Consulted PT,OT.  Not really sure,where sepsis and elevated lactic acid  came.all cultures negative,only possible source is cholecystitis,on empiric zosyn.consulted palliative as well.

## 2022-10-31 NOTE — PLAN OF CARE
Wound care consulted - skin with darken area to the coccyx area - frequent bowel movements - barrier cream applied - wound care consult placed

## 2022-10-31 NOTE — PLAN OF CARE
[facility] - Intensive Care  Initial Discharge Assessment       Primary Care Provider: Sowmya Mccain MD    Admission Diagnosis: Weakness [R53.1]  Sepsis [A41.9]    Admission Date: 10/29/2022  Expected Discharge Date: pending    Discharge Barriers Identified: None    Payor: BLUE CROSS [facility] / Plan: BCBS ALL OUT OF STATE / Product Type: PPO /     Extended Emergency Contact Information  Primary Emergency Contact: Ashley Bloom  Address: [address] [place] [address] [facility]  Home Phone: [phone]  Mobile Phone: [phone]  Relation: Spouse    Discharge Plan A: Home Health (Not sure if she wants home health at this time.  Normally able to perform ADLs per spouse.)  Discharge Plan B: Home      [id number] [place] - [address] [facility]  [address]  [place] [address]  Phone: [phone] Fax: [phone]    [facility] [place] - [address]  [address] [facility] [place] [address]  Phone: [phone] Fax: [phone]    [facility]  [address] [facility] [address]  Phone: [phone] Fax: [phone]      Initial Assessment (most recent)       Adult Discharge Assessment - 10/31/22 1813          Discharge Assessment    Assessment Type Discharge Planning Assessment     Confirmed/corrected address, phone number and insurance Yes     Confirmed Demographics Correct on Facesheet     Source of Information family;health record   Spouse:  Ashley Bloom @ bedside    If unable to respond/provide information was family/caregiver contacted? Yes     Contact Name/Number Ashley Bloom;  spouse;  [phone]     When was your last doctors appointment? 10/26/22     Does patient/caregiver understand observation status --   n/a    Communicated MAVIS with patient/caregiver No     Reason For Admission Weakness     Lives With spouse     Facility Arrived From: home     Do you expect to return to your current living  situation? Yes     Do you have help at home or someone to help you manage your care at home? Yes     Who are your caregiver(s) and their phone number(s)? Otis Bloom;  [phone]     Prior to hospitilization cognitive status: Alert/Oriented     Current cognitive status: Alert/Oriented   Was lethargic during this contact.    Walking or Climbing Stairs Difficulty none     Dressing/Bathing Difficulty none     Equipment Currently Used at Home none     Readmission within 30 days? --   Obs 1 month ago    Patient currently being followed by outpatient case management? No     Do you currently have service(s) that help you manage your care at home? No     Do you take prescription medications? Yes     Do you have prescription coverage? Yes     Coverage BCBC/medicasre     Do you have any problems affording any of your prescribed medications? No     Is the patient taking medications as prescribed? yes     Who is going to help you get home at discharge? spouse     How do you get to doctors appointments? family or friend will provide       Are you on dialysis? No   [facility]; [facility]; [facility]    Dialysis Name and Scheduled days [facility]; [facility]; [facility]     Do you take coumadin? No   Eliquis    Discharge Plan A Home Health   Not sure if she wants home health at this time.  Normally able to perform ADLs per spouse.    Discharge Plan B Home     DME Needed Upon Discharge  none     Discharge Plan discussed with: Spouse/sig other     Name(s) and Number(s) Otis Bloom     Discharge Barriers Identified None        Relationship/Environment    Name(s) of Who Lives With Patient Otis Bloom

## 2022-10-31 NOTE — HPI
"From H&P: "57 year old female, with pertinent medical history of anemia, kidney cancer, CHF with EF of 35%,, HTN, Pafib on OAC,prediabetes, and myocardiopathy, presents to the ED to evaluate her worsening bilateral leg pain and weakness for 5 days. Patient's  states she was seen for the same symptoms 5 days ago as well as sore throat, chest pain, and nausea. She had labwork done and her results were all negative. Sore throat, chest pain, and nausea have resolved. Patient woke up this morning very weak and fatigued. She was late for dialysis and when she arrived, she was advised to come to the ED. Patient received her dialysis 2 days ago as normal. Patient was able to ambulate alone yesterday with pain, but needs support today.per ER record,  reports she has decreased appetite and is no longer producing urine. No pain medications taken today.  notes that a week ago, patient attended an event and possibly had sick contacts. Patient denies congestion, cough, trauma to legs,or other associated symptoms,patient is hypothermic in ER 93%,has elevated lactic acid,more than 12,has leucocytosis 14,patient is septic,can not gave IVF duo to  ESRD,has been  started on broad spectrum IV Abx.he has severe acidosis C O 2 of 8 and elevated Troponin 2.8,she denies chest apian dn abdominal pain,only complain is leg pain.she has also Hyperkalemia 6.0"    Palliative medicine consulted for advance care planning and continuity of care; Met with pt at bedside; for details of visit, see advance care planning section of plan.     "

## 2022-10-31 NOTE — PLAN OF CARE
Pt free from fall/injury. Multiple loose stools r/t lactulose, barrier cream/mepilex applied to sacrum. VSS this shift.

## 2022-10-31 NOTE — ASSESSMENT & PLAN NOTE
Denies chest pain,will monitor,checked Echo,has EF of 25%,cardiology is on case,.she denies chest pain.no pan for intervention at this time.

## 2022-11-01 PROBLEM — E87.20 LACTIC ACIDOSIS: Status: RESOLVED | Noted: 2022-10-29 | Resolved: 2022-11-01

## 2022-11-01 LAB
ALBUMIN SERPL BCP-MCNC: 2 G/DL (ref 3.5–5.2)
ALP SERPL-CCNC: 440 U/L (ref 55–135)
ALT SERPL W/O P-5'-P-CCNC: 197 U/L (ref 10–44)
AMMONIA PLAS-SCNC: 30 UMOL/L (ref 10–50)
ANION GAP SERPL CALC-SCNC: 10 MMOL/L (ref 8–16)
ANISOCYTOSIS BLD QL SMEAR: SLIGHT
AST SERPL-CCNC: 494 U/L (ref 10–40)
BASOPHILS # BLD AUTO: 0.01 K/UL (ref 0–0.2)
BASOPHILS NFR BLD: 0.1 % (ref 0–1.9)
BILIRUB SERPL-MCNC: 4.9 MG/DL (ref 0.1–1)
BUN SERPL-MCNC: 30 MG/DL (ref 6–20)
CALCIUM SERPL-MCNC: 8.7 MG/DL (ref 8.7–10.5)
CHLORIDE SERPL-SCNC: 101 MMOL/L (ref 95–110)
CO2 SERPL-SCNC: 28 MMOL/L (ref 23–29)
CREAT SERPL-MCNC: 3.1 MG/DL (ref 0.5–1.4)
DIFFERENTIAL METHOD: ABNORMAL
EOSINOPHIL # BLD AUTO: 0.1 K/UL (ref 0–0.5)
EOSINOPHIL NFR BLD: 0.5 % (ref 0–8)
ERYTHROCYTE [DISTWIDTH] IN BLOOD BY AUTOMATED COUNT: 22.1 % (ref 11.5–14.5)
EST. GFR  (NO RACE VARIABLE): 17 ML/MIN/1.73 M^2
GLUCOSE SERPL-MCNC: 70 MG/DL (ref 70–110)
HCT VFR BLD AUTO: 29.8 % (ref 37–48.5)
HGB BLD-MCNC: 10 G/DL (ref 12–16)
HYPOCHROMIA BLD QL SMEAR: ABNORMAL
IMM GRANULOCYTES # BLD AUTO: 0.07 K/UL (ref 0–0.04)
IMM GRANULOCYTES NFR BLD AUTO: 0.7 % (ref 0–0.5)
LYMPHOCYTES # BLD AUTO: 0 K/UL (ref 1–4.8)
LYMPHOCYTES NFR BLD: 0 % (ref 18–48)
MCH RBC QN AUTO: 33.3 PG (ref 27–31)
MCHC RBC AUTO-ENTMCNC: 33.6 G/DL (ref 32–36)
MCV RBC AUTO: 99 FL (ref 82–98)
MONOCYTES # BLD AUTO: 0.3 K/UL (ref 0.3–1)
MONOCYTES NFR BLD: 3 % (ref 4–15)
NEUTROPHILS # BLD AUTO: 9.6 K/UL (ref 1.8–7.7)
NEUTROPHILS NFR BLD: 95.7 % (ref 38–73)
NRBC BLD-RTO: 17 /100 WBC
PLATELET # BLD AUTO: 117 K/UL (ref 150–450)
PMV BLD AUTO: 10.2 FL (ref 9.2–12.9)
POIKILOCYTOSIS BLD QL SMEAR: SLIGHT
POLYCHROMASIA BLD QL SMEAR: ABNORMAL
POTASSIUM SERPL-SCNC: 3.5 MMOL/L (ref 3.5–5.1)
PROT SERPL-MCNC: 4.9 G/DL (ref 6–8.4)
RBC # BLD AUTO: 3 M/UL (ref 4–5.4)
SODIUM SERPL-SCNC: 139 MMOL/L (ref 136–145)
TARGETS BLD QL SMEAR: ABNORMAL
WBC # BLD AUTO: 10.04 K/UL (ref 3.9–12.7)

## 2022-11-01 PROCEDURE — 63600175 PHARM REV CODE 636 W HCPCS: Performed by: HOSPITALIST

## 2022-11-01 PROCEDURE — 82140 ASSAY OF AMMONIA: CPT | Performed by: HOSPITALIST

## 2022-11-01 PROCEDURE — 99233 SBSQ HOSP IP/OBS HIGH 50: CPT | Mod: ,,, | Performed by: NURSE PRACTITIONER

## 2022-11-01 PROCEDURE — 25000003 PHARM REV CODE 250: Performed by: HOSPITALIST

## 2022-11-01 PROCEDURE — 99233 PR SUBSEQUENT HOSPITAL CARE,LEVL III: ICD-10-PCS | Mod: ,,, | Performed by: NURSE PRACTITIONER

## 2022-11-01 PROCEDURE — 85025 COMPLETE CBC W/AUTO DIFF WBC: CPT | Performed by: HOSPITALIST

## 2022-11-01 PROCEDURE — 11000001 HC ACUTE MED/SURG PRIVATE ROOM

## 2022-11-01 PROCEDURE — 80053 COMPREHEN METABOLIC PANEL: CPT | Performed by: HOSPITALIST

## 2022-11-01 PROCEDURE — 99233 SBSQ HOSP IP/OBS HIGH 50: CPT | Mod: ,,, | Performed by: REGISTERED NURSE

## 2022-11-01 PROCEDURE — 99233 PR SUBSEQUENT HOSPITAL CARE,LEVL III: ICD-10-PCS | Mod: ,,, | Performed by: REGISTERED NURSE

## 2022-11-01 RX ADMIN — LACTULOSE 10 G: 10 SOLUTION ORAL at 08:11

## 2022-11-01 RX ADMIN — HYDRALAZINE HYDROCHLORIDE 100 MG: 25 TABLET, FILM COATED ORAL at 04:11

## 2022-11-01 RX ADMIN — LACTULOSE 10 G: 10 SOLUTION ORAL at 09:11

## 2022-11-01 RX ADMIN — LACTULOSE 10 G: 10 SOLUTION ORAL at 04:11

## 2022-11-01 RX ADMIN — RIFAXIMIN 550 MG: 550 TABLET ORAL at 08:11

## 2022-11-01 RX ADMIN — PIPERACILLIN AND TAZOBACTAM 4.5 G: 4; .5 INJECTION, POWDER, LYOPHILIZED, FOR SOLUTION INTRAVENOUS; PARENTERAL at 09:11

## 2022-11-01 RX ADMIN — SACUBITRIL AND VALSARTAN 2 TABLET: 24; 26 TABLET, FILM COATED ORAL at 09:11

## 2022-11-01 RX ADMIN — HYDRALAZINE HYDROCHLORIDE 100 MG: 25 TABLET, FILM COATED ORAL at 09:11

## 2022-11-01 RX ADMIN — AMLODIPINE BESYLATE 10 MG: 5 TABLET ORAL at 08:11

## 2022-11-01 RX ADMIN — HYDRALAZINE HYDROCHLORIDE 100 MG: 25 TABLET, FILM COATED ORAL at 05:11

## 2022-11-01 RX ADMIN — MUPIROCIN: 20 OINTMENT TOPICAL at 08:11

## 2022-11-01 RX ADMIN — PIPERACILLIN AND TAZOBACTAM 4.5 G: 4; .5 INJECTION, POWDER, LYOPHILIZED, FOR SOLUTION INTRAVENOUS; PARENTERAL at 08:11

## 2022-11-01 RX ADMIN — METOPROLOL SUCCINATE 100 MG: 50 TABLET, EXTENDED RELEASE ORAL at 08:11

## 2022-11-01 RX ADMIN — SACUBITRIL AND VALSARTAN 2 TABLET: 24; 26 TABLET, FILM COATED ORAL at 08:11

## 2022-11-01 RX ADMIN — RIFAXIMIN 550 MG: 550 TABLET ORAL at 09:11

## 2022-11-01 NOTE — CONSULTS
Patient presented as fatigued but was with , Otis who lamented his wife's long illness and said their edie is strong.  provied active listening and prayer as requested and he seemed better spiritually connected.

## 2022-11-01 NOTE — PROGRESS NOTES
[facility] Intensive Care  General Surgery  Progress Note    Subjective:     History of Present Illness:  57 year old female, with pertinent medical history of anemia, kidney cancer, CHF with EF of 35%,, HTN, Pafib on OAC,prediabetes, and myocardiopathy, presents to the ED to evaluate her worsening bilateral leg pain and weakness for 5 days. Patient's  states she was seen for the same symptoms 5 days ago as well as sore throat, chest pain, and nausea. She had labwork done and her results were all negative. Sore throat, chest pain, and nausea have resolved. Patient woke up this morning very weak and fatigued. She was late for dialysis and when she arrived, she was advised to come to the ED. Patient received her dialysis 2 days ago as normal. Patient was able to ambulate alone yesterday with pain, but needs support today.per ER record,  reports she has decreased appetite and is no longer producing urine. No pain medications taken today.  notes that a week ago, patient attended an event and possibly had sick contacts.       Post-Op Info:  * No surgery found *         Interval History: No issues overnight. ECHO yesterday with severe cardiac dysfunction, as well as severe pulmonary HTN. No abdominal pain today. No nausea/vomiting. WBC 9(10)    Medications:  Continuous Infusions:   sodium bicarbonate drip 75 mL/hr at 10/31/22 0556     Scheduled Meds:   sodium chloride 0.9%   Intravenous Once    amLODIPine  10 mg Oral Daily    dextrose 10%  250 mL Intravenous Once    hydrALAZINE  100 mg Oral Q8H    lactulose  10 g Oral TID    metoprolol succinate  100 mg Oral Daily    mupirocin   Nasal BID    piperacillin-tazobactam (ZOSYN) IVPB  4.5 g Intravenous Q12H    rifAXImin  550 mg Oral BID    sacubitriL-valsartan  2 tablet Oral BID    sodium zirconium cyclosilicate  10 g Oral Daily     PRN Meds:sodium chloride 0.9%, acetaminophen, hydrALAZINE, labetalol, sodium chloride 0.9%     Review of patient's allergies  indicates:   Allergen Reactions    Coreg [carvedilol] Other (See Comments)     Nausea/vomiting    Allopurinol      Other reaction(s): abnormal transaminases     Objective:     Vital Signs (Most Recent):  Temp: 97.1 °F (36.2 °C) (10/31/22 0700)  Pulse: 67 (10/31/22 0800)  Resp: (!) 23 (10/31/22 0800)  BP: (!) 81/56 (10/31/22 0800)  SpO2: 96 % (10/31/22 0800)   Vital Signs (24h Range):  Temp:  [97.1 °F (36.2 °C)-97.8 °F (36.6 °C)] 97.1 °F (36.2 °C)  Pulse:  [64-79] 67  Resp:  [16-26] 23  SpO2:  [93 %-98 %] 96 %  BP: ()/(39-97) 81/56     Weight: 50 kg (110 lb 3.7 oz)  Body mass index is 18.92 kg/m².    Intake/Output - Last 3 Shifts         10/29 0700  10/30 0659 10/30 0700  10/31 0659 10/31 0700 11/01 0659    P.O. 85 260     I.V. (mL/kg) 1115.3 (22.3) 1963 (39.3)     Other 500      IV Piggyback 433.5 99.7     Total Intake(mL/kg) 2133.8 (42.7) 2322.7 (46.5)     Other 1500      Stool   1    Total Output 1500  1    Net +633.8 +2322.7 -1           Stool Occurrence  10 x             Physical Exam  Vitals and nursing note reviewed.   Constitutional:       General: She is not in acute distress.  Cardiovascular:      Rate and Rhythm: Normal rate.      Pulses: Normal pulses.   Pulmonary:      Effort: Pulmonary effort is normal. No respiratory distress.   Abdominal:      General: There is no distension.      Palpations: Abdomen is soft.      Tenderness: There is no abdominal tenderness.   Neurological:      Mental Status: She is alert.       Significant Labs:  I have reviewed all pertinent lab results within the past 24 hours.  CBC:   Recent Labs   Lab 10/31/22  0840   WBC 9.93   RBC 2.78*   HGB 9.4*   HCT 27.4*   *   MCV 99*   MCH 33.8*   MCHC 34.3     BMP:   Recent Labs   Lab 10/30/22  2351   *      K 3.8   CL 93*   CO2 31*   BUN 64*   CREATININE 5.4*   CALCIUM 8.7     CMP:   Recent Labs   Lab 10/30/22  2351   *   CALCIUM 8.7   ALBUMIN 2.1*   PROT 4.8*      K 3.8   CO2 31*   CL 93*   BUN  64*   CREATININE 5.4*   ALKPHOS 241*   *   *   BILITOT 2.9*       Significant Diagnostics:  I have reviewed all pertinent imaging results/findings within the past 24 hours.    Assessment/Plan:     Transaminitis  57 YOF with CHF(EF 25%, pulm HTN 60), kidney cancer, HTN, Afib(on Eliquis), ESRD (on dialysis) admitted for abdominal pain. General surgery consulted for transaminitis due to gallstones or possibly intrinsic liver disease.  Now with clinically resolved abdominal pain, no nausea/vomiting. Afebrile with normal WBC. Very low suspicion for acute cholecystitis. Particularly prohibitive operative risk, poor candidate for cholecystectomy.     -Will follow-up HIDA. If acute cholecystitis, recommend IR for cholecystostomy tube placement  -Patient unable MRCP due to having an AICD in place.    -Would advance diet as tolerated  -Serial abdominal exams  -Rest of care per primary     ---    F/u:  HIDA unable to eval gallbladder due to poor hepatic clearance. No indication for cholecystostomy drain as pain has improved.           Greta Mckeon MD  General Surgery  [facility] - Intensive Care

## 2022-11-01 NOTE — ASSESSMENT & PLAN NOTE
Suspect to liver congestion,will monitor,hepatitis panel  is negative,,on lactulose for elevated ammonia.  GI following

## 2022-11-01 NOTE — PROGRESS NOTES
[facility]    Reason for consult: transaminitis    PCP:   Sowmya Mccain       LOS: 3        Initial History of Present Illness (HPI):  This is a 57 y.o. female consulted to GI service for transaminitis. Patient lethargic and unable to hold conversation for history interview. Some information from , most from chart.  Patient with acute onset of generalized weakness 3 days ago.  reports she has decreased appetite and is no longer producing urine.  notes that a week ago, patient attended an event and possibly had sick contacts.   Lab results with elevated lfts, bili, alk phos. US of abdomen shows cholelithiasis with cholecystitis, hepatic lesion with minimal flow.  Today lethargic, yellowed sclera, tolerating regular diet. S/p HIDA scan, awaiting results.     Interval Hx  Alertness much improved today. Transferred from med to chair with moderate assistance.       Review of Systems   Constitutional:  Positive for activity change, appetite change and fatigue. Negative for chills, diaphoresis and fever.   HENT:  Negative for congestion, drooling, rhinorrhea, sore throat and trouble swallowing.    Eyes:  Negative for discharge.   Respiratory:  Negative for cough, shortness of breath and wheezing.    Cardiovascular:  Negative for chest pain, palpitations and leg swelling.   Gastrointestinal:  Negative for abdominal distention, abdominal pain, anal bleeding, blood in stool, constipation, diarrhea, nausea, rectal pain and vomiting.   Endocrine: Negative for cold intolerance and heat intolerance.   Genitourinary:  Negative for frequency, hematuria and urgency.   Musculoskeletal:  Negative for arthralgias.   Skin:  Negative for color change, pallor and rash.   Neurological:  Positive for weakness. Negative for syncope, facial asymmetry and numbness.   Psychiatric/Behavioral:  Negative for agitation and confusion. The patient is not nervous/anxious.          Medical History:   has a past medical history of Anemia, Bronchitis (03/01/2017), Cancer (2016), CHF (congestive heart failure), NYHA class II, chronic, systolic, CMV (cytomegalovirus) antibody positive, Essential hypertension (9/23/2015), H/O herpes simplex type 2 infection, Herpes simplex type 1 antibody positive, History of kidney cancer, Hyperparathyroidism, unspecified, Hyperuricemia without signs of inflammatory arthritis and tophaceous disease, Kidney stones, LGSIL (low grade squamous intraepithelial dysplasia), Myocardiopathy (7/21/2017), Prediabetes, Proteinuria, Renal disorder, Thyroid nodule, and Urate nephropathy.    Surgical History:  has a past surgical history that includes Tonsillectomy; Tubal ligation; Colonoscopy (N/A, 11/12/2015); Nephrectomy-Laparoscopic-Donor (Left, 01/12/2016); Peritoneal catheter insertion; Salpingoophorectomy (Right, 2016); Permacath insertion (01/12/2017); Breast cyst excision; Colonoscopy (N/A, 3/12/2021); and Insertion of biventricular implantable cardioverter-defibrillator (ICD) (04/2021).    Family History: family history includes Diabetes in her father and maternal grandfather; Heart disease in her sister; Hypertension in her mother; Kidney disease in her father; No Known Problems in her brother, brother, maternal aunt, maternal grandmother, maternal uncle, paternal aunt, paternal grandfather, paternal grandmother, paternal uncle, sister, sister, son, and son..     Social History:  reports that she has never smoked. She has never used smokeless tobacco. She reports that she does not drink alcohol and does not use drugs.    Review of patient's allergies indicates:   Allergen Reactions    Coreg [carvedilol] Other (See Comments)     Nausea/vomiting    Allopurinol      Other reaction(s): abnormal transaminases       No current facility-administered medications on file prior to encounter.     Current Outpatient Medications on File Prior to Encounter   Medication Sig Dispense Refill     acetaminophen (TYLENOL) 500 MG tablet Take 500 mg by mouth every 6 (six) hours as needed for Pain.      apixaban (ELIQUIS) 2.5 mg Tab Take 1 tablet (2.5 mg total) by mouth 2 (two) times daily. 60 tablet 11    CABOMETYX 60 mg Tab TAKE ONE TABLET BY MOUTH ONCE DAILY AT THE SAME TIME ON AN EMPTY STOMACH AT LEAST 1 HOUR BEFORE OR 2 HOURS AFTER EATING. AVOID GRAPEFRUIT PRODUCTS 30 tablet 4    cloNIDine 0.3 mg/24 hr td ptwk (CATAPRES) 0.3 mg/24 hr Place 1 patch onto the skin every 7 days. 4 patch 11    epoetin david-epbx (RETACRIT INJ) Epoetin david - epbx (Retacrit)      hydrALAZINE (APRESOLINE) 100 MG tablet Take 1 tablet (100 mg total) by mouth every 12 (twelve) hours. 180 tablet 2    lidocaine-prilocaine (EMLA) cream APPLY ATLEAST 30 MINUTES BEFORE TREATMENT 3 TIMES A WEEK 30 g 11    metoprolol succinate (TOPROL-XL) 100 MG 24 hr tablet Take 1 tablet (100 mg total) by mouth once daily. 90 tablet 0    mv,Ca,min-folic acid-vit K1 (ONE-A-DAY WOMEN'S 50 PLUS) 400-20 mcg Tab Take 1 tablet by mouth.      ondansetron (ZOFRAN-ODT) 8 MG TbDL Take 1 tablet (8 mg total) by mouth every 12 (twelve) hours as needed. 30 tablet 1    oxyCODONE-acetaminophen (PERCOCET) 5-325 mg per tablet Take 1 tablet by mouth every 6 (six) hours as needed for Pain. 30 each 0    oxyCODONE-acetaminophen (PERCOCET) 5-325 mg per tablet Take 1 tablet by mouth every 4 (four) hours as needed. 18 tablet 0    promethazine (PHENERGAN) 25 MG tablet Take 1 tablet (25 mg total) by mouth every 6 (six) hours as needed for Nausea. 15 tablet 0    sacubitriL-valsartan (ENTRESTO) 49-51 mg per tablet Take 1 tablet by mouth 2 (two) times daily. 60 tablet 11    sevelamer carbonate (RENVELA) 800 mg Tab TAKE 2 TABLETS (1,600 MG TOTAL) BY MOUTH 3 (THREE) TIMES DAILY WITH MEALS. (Patient not taking: Reported on 10/26/2022) 540 tablet 0    sodium chloride 0.9% SolP 100 mL with iron sucrose 100 mg iron/5 mL Soln 100 mg 50 mg.      [DISCONTINUED] amLODIPine (NORVASC) 5 MG tablet  TAKE 1 TABLET BY MOUTH EVERY DAY 90 tablet 3        Objective Findings:    Vital Signs:  Temp:  [95.6 °F (35.3 °C)-98 °F (36.7 °C)]   Pulse:  [67-82]   Resp:  [12-32]   BP: (111-154)/()   SpO2:  [92 %-100 %]   Body mass index is 18.92 kg/m².      Physical Exam  Vitals and nursing note reviewed.   Constitutional:       Appearance: She is ill-appearing and toxic-appearing.   HENT:      Head: Normocephalic.      Nose: Nose normal.      Mouth/Throat:      Mouth: Mucous membranes are moist.   Eyes:      General: Scleral icterus present.      Pupils: Pupils are equal, round, and reactive to light.   Cardiovascular:      Heart sounds: Normal heart sounds.   Pulmonary:      Effort: Pulmonary effort is normal.      Breath sounds: Normal breath sounds.   Abdominal:      General: Bowel sounds are normal. There is no distension.      Palpations: Abdomen is soft.      Tenderness: There is no abdominal tenderness.   Musculoskeletal:         General: Normal range of motion.      Cervical back: Normal range of motion.   Skin:     Capillary Refill: Capillary refill takes less than 2 seconds.   Neurological:      General: No focal deficit present.      Mental Status: She is alert.   Psychiatric:         Attention and Perception: Attention normal.         Mood and Affect: Mood normal.         Behavior: Behavior normal.         Thought Content: Thought content normal.         Judgment: Judgment normal.             Labs:  Lab Results   Component Value Date    WBC 10.04 11/01/2022    HGB 10.0 (L) 11/01/2022    HCT 29.8 (L) 11/01/2022     (L) 11/01/2022    CHOL 101 (L) 10/22/2022    TRIG 97 10/22/2022    HDL 35 (L) 10/22/2022     (H) 11/01/2022     (H) 11/01/2022     11/01/2022    K 3.5 11/01/2022     11/01/2022    CREATININE 3.1 (H) 11/01/2022    BUN 30 (H) 11/01/2022    CO2 28 11/01/2022    TSH 5.286 (H) 10/29/2022    INR 1.1 09/24/2022    HGBA1C 4.8 10/22/2022             Imaging: 10/29 US  abdomen-Gallstones.  Thick-walled gallbladder.  Positive sonographic Sloan sign.  Abdominal ascites.  The possibility of acute cholecystitis should be considered.  1.3 x 0.8 x 1.3 cm left hepatic lesion with minimal peripheral blood flow.  Recommend correlation with MRI of the abdomen from 12/28/2020  Multiple right renal cyst or polycystic kidney.      Endoscopy: 3/2021 Colonoscopy- - One 3 mm polyp in the sigmoid colon, removed with  a jumbo cold forceps. Resected and retrieved.                        - Non-bleeding external and internal hemorrhoids.                        - The examination was otherwise normal on direct and                        retroflexion views.     I have independently reviewed and interpreted the imaging above    Assessment:  Patient is a .57 y.o. y/o .female with  has a past medical history of Anemia, Bronchitis (03/01/2017), Cancer (2016), CHF (congestive heart failure), NYHA class II, chronic, systolic, CMV (cytomegalovirus) antibody positive, Essential hypertension (9/23/2015), H/O herpes simplex type 2 infection, Herpes simplex type 1 antibody positive, History of kidney cancer, Hyperparathyroidism, unspecified, Hyperuricemia without signs of inflammatory arthritis and tophaceous disease, Kidney stones, LGSIL (low grade squamous intraepithelial dysplasia), Myocardiopathy (7/21/2017), Prediabetes, Proteinuria, Renal disorder, Thyroid nodule, and Urate nephropathy. Consulted to the GI service for transaminitis.               Recommendations:  1. Transaminitis. Elevated alk phos. Elevated bili. Cholelithiasis with cystitis. Liver lesion.Polycystic kidney disease. Metastatic renal cell carcinoma, s/p nephrectomy.EF 25%.    Bili, alk phos, lft elevation could be in relation to gallbladder presentation. HIDA scan limited d/t poor liver clearance. Alk phos almost doubled, bili increased and Lfts with slight uptick. Follow levels.     Liver lesion on current US, previous CT from 2021 showing  liver lesions or cysts. HIDA showing poor liver clearance. Not likely to tolerate any invasive diagnostic at this time. AFP ordered.    Thank you so much for allowing us to participate in the care of Eva Bloom . Please contact us if you have any additional questions.    Diana Christian NP  Gastroenterology  [facility] - Med Surg

## 2022-11-01 NOTE — PROGRESS NOTES
[facility] Medicine  Progress Note    Patient Name: Eva Bloom  MRN: [id number]  Patient Class: IP- Inpatient   Admission Date: 10/29/2022  Length of Stay: 3 days  Attending Physician: Rolf Recinos MD  Primary Care Provider: Sowmya Mccain MD        Subjective:     Principal Problem:Sepsis        HPI:  57 year old female, with pertinent medical history of anemia, kidney cancer, CHF with EF of 35%,, HTN, Pafib on OAC,prediabetes, and myocardiopathy, presents to the ED to evaluate her worsening bilateral leg pain and weakness for 5 days. Patient's  states she was seen for the same symptoms 5 days ago as well as sore throat, chest pain, and nausea. She had labwork done and her results were all negative. Sore throat, chest pain, and nausea have resolved. Patient woke up this morning very weak and fatigued. She was late for dialysis and when she arrived, she was advised to come to the ED. Patient received her dialysis 2 days ago as normal. Patient was able to ambulate alone yesterday with pain, but needs support today.per ER record,  reports she has decreased appetite and is no longer producing urine. No pain medications taken today.  notes that a week ago, patient attended an event and possibly had sick contacts. Patient denies congestion, cough, trauma to legs,or other associated symptoms,patient is hypothermic in ER 93%,has elevated lactic acid,more than 12,has leucocytosis 14,patient is septic,can not gave IVF duo to  ESRD,has been  started on broad spectrum IV Abx.he has severe acidosis C O 2 of 8 and elevated Troponin 2.8,she denies chest apian dn abdominal pain,only complain is leg pain.she has also Hyperkalemia 6.0.      Overview/Hospital Course:  57 year old female presents to the ED to evaluate her worsening bilateral leg pain and weakness for 5 days.  Patient was hypothermic in ER with lactic acidosis.  Started on Bicarb drip and underwent HD.  Started on  broad spectrum ABx's.  Elevated Troponin and Cardiology consulted.  Echo shows EF of 25%.  Denies any chest pain and no plans for intervention at this time.  US abdomen shows possible acute cholecystitis.  Surgery consulted.  HIDA unable to eval gallbladder due to poor hepatic clearance. No indication for cholecystostomy drain as pain has improved.  PT/OT consulted.      Interval History: No new complaints. Weak and lethargic    Review of Systems   HENT:  Negative for ear discharge and ear pain.    Eyes:  Negative for discharge and itching.   Endocrine: Negative for cold intolerance and heat intolerance.   Neurological:  Negative for seizures and syncope.   Objective:     Vital Signs (Most Recent):  Temp: 97.5 °F (36.4 °C) (11/01/22 1137)  Pulse: 80 (11/01/22 1137)  Resp: 17 (11/01/22 1137)  BP: 119/65 (11/01/22 1137)  SpO2: (!) 94 % (11/01/22 1137)   Vital Signs (24h Range):  Temp:  [95.6 °F (35.3 °C)-98 °F (36.7 °C)] 97.5 °F (36.4 °C)  Pulse:  [68-82] 80  Resp:  [12-32] 17  SpO2:  [92 %-100 %] 94 %  BP: (111-154)/() 119/65     Weight: 50 kg (110 lb 3.7 oz)  Body mass index is 18.92 kg/m².    Intake/Output Summary (Last 24 hours) at 11/1/2022 1413  Last data filed at 11/1/2022 0030  Gross per 24 hour   Intake 535.47 ml   Output 1500 ml   Net -964.53 ml      Physical Exam  Constitutional:       Appearance: She is ill-appearing. She is not diaphoretic.   HENT:      Head: Normocephalic.      Nose: Nose normal.      Mouth/Throat:      Mouth: Mucous membranes are dry.   Eyes:      Extraocular Movements: Extraocular movements intact.      Pupils: Pupils are equal, round, and reactive to light.   Cardiovascular:      Rate and Rhythm: Normal rate and regular rhythm.      Heart sounds: No murmur heard.  Pulmonary:      Effort: No respiratory distress.      Breath sounds: No wheezing.   Abdominal:      General: Abdomen is flat. There is no distension.      Palpations: Abdomen is soft.      Tenderness: There is no  abdominal tenderness.   Musculoskeletal:         General: Swelling and tenderness present.      Cervical back: Normal range of motion and neck supple.   Skin:     Coloration: Skin is not jaundiced.      Findings: No bruising.   Neurological:      General: No focal deficit present.      Mental Status: She is lethargic.      Cranial Nerves: No cranial nerve deficit.      Motor: No weakness.   Psychiatric:         Speech: Speech is delayed.         Behavior: Behavior is slowed.       Significant Labs: All pertinent labs within the past 24 hours have been reviewed.  BMP:   Recent Labs   Lab 11/01/22  0110   GLU 70      K 3.5      CO2 28   BUN 30*   CREATININE 3.1*   CALCIUM 8.7     CBC:   Recent Labs   Lab 10/30/22  2351 10/31/22  0840 11/01/22  0110   WBC 10.57 9.93 10.04   HGB 9.5* 9.4* 10.0*   HCT 28.2* 27.4* 29.8*    144* 117*       Significant Imaging: I have reviewed all pertinent imaging results/findings within the past 24 hours.      Assessment/Plan:      * Sepsis  This patient does have evidence of infective focus  My overall impression is sepsis. Vital signs were reviewed and noted in progress note.  Antibiotics given-   Antibiotics (From admission, onward)    Start     Stop Route Frequency Ordered    10/30/22 2100  piperacillin-tazobactam 4.5 g in dextrose 5 % 100 mL IVPB (ready to mix system)         -- IV Every 12 hours (non-standard times) 10/30/22 1012    10/29/22 2100  rifAXIMin tablet 550 mg         -- Oral 2 times daily 10/29/22 1942    10/29/22 1245  mupirocin 2 % ointment         11/03 0859 Nasl 2 times daily 10/29/22 1140        Cultures were taken-   Microbiology Results (last 7 days)     Procedure Component Value Units Date/Time    Blood Culture #1 **CANNOT BE ORDERED STAT** [[id number]] Collected: 10/29/22 0941    Order Status: Completed Specimen: Blood from Peripheral, Hand, Right Updated: 10/31/22 1503     Blood Culture, Routine No Growth to date      No Growth to date      No  Growth to date    Blood Culture #2 **CANNOT BE ORDERED STAT** [[id number]] Collected: 10/29/22 0915    Order Status: Completed Specimen: Blood from Peripheral, Hand, Right Updated: 10/31/22 1503     Blood Culture, Routine No Growth to date      No Growth to date      No Growth to date        Latest lactate reviewed, they are-  Recent Labs   Lab 10/29/22  1813 10/30/22  0146 10/30/22  0744   LACTATE 5.3* 2.2 1.5       Organ dysfunction indicated by Acute liver injury  Source- initially thought to be acute cholecystitis.  HIDA unable to eval gallbladder due to poor hepatic clearance. No indication for cholecystostomy drain as pain has improved per Surgery.    Source control Achieved by- IV Abx.    US abdomen show possible acute cholecystitis,on IV Abx ,surgery is consulted.denies abdominal pain..plan for HIDA scan,can not  have MRCP duo to AICD, and if is positive need IR consult for cholecystomy tube placement,not good surgical candidate,OAC is on hold.            Transaminitis  Suspect to liver congestion,will monitor,hepatitis panel  is negative,,on lactulose for elevated ammonia.  GI following      Elevated troponin  Denies chest pain,will monitor,checked Echo,has EF of 25%,cardiology is on case,.she denies chest pain.no pan for intervention at this time.      PAF (paroxysmal atrial fibrillation)  Patient with Persistent (7 days or more) atrial fibrillation which is controlled currently with Beta Blocker. Patient is currently in sinus rhythm.[id number] Score: 1. HASBLED Score: . Anticoagulation indicated. Anticoagulation done with eliquis.  Resume Eliquis if no procedure planned.        ICD (implantable cardioverter-defibrillator) in place - SubQ  Will monitor.      Chronic kidney disease-mineral and bone disorder  On HD.      Anemia in ESRD (end-stage renal disease)  Will monitor.      Chronic combined systolic and diastolic congestive heart failure        Nonrheumatic mitral valve regurgitation  Per  record,      ESRD (end stage renal disease) on dialysis  Nephrology consulted for HD      Pulmonary hypertension  per record.      Cardiomyopathy, nonischemic  Echo shows EF of 25%      S/p nephrectomy left  Per record.      History of kidney cancer  per record.      Essential hypertension  Resumed home medications..        VTE Risk Mitigation (From admission, onward)         Ordered     Place sequential compression device  Until discontinued         11/01/22 7133                Discharge Planning   MAVIS:      Code Status: Full Code   Is the patient medically ready for discharge?:     Reason for patient still in hospital (select all that apply): Patient unstable  Discharge Plan A: Home Health (Not sure if she wants home health at this time.  Normally able to perform ADLs per spouse.)                  Rolf Recinos MD  Department of Hospital Medicine   [facility] Surg

## 2022-11-01 NOTE — PROGRESS NOTES
"[facility] Surg  Palliative Medicine  Progress Note    Patient Name: Eva Bloom  MRN: [id number]  Admission Date: 10/29/2022  Hospital Length of Stay: 3 days  Code Status: Full Code   Attending Provider: Rolf Recinos MD  Consulting Provider: Leila Munoz NP  Primary Care Physician: Sowmya Mccain MD  Principal Problem:Sepsis    Patient information was obtained from patient, past medical records, ER records and primary team.      Assessment/Plan:   Advance Care Planning    Palliative Encounter   Date: 11/1/2022  - interval chart reviewed in detail; discussed pt during ICU MDT rounds; plan to transfer to floor today   - attempted to meet with pt to assess if more awake/able to participate in GOC today, but was still very lethargic; attempted to engage multiple times, would awaken to name and stated "feeling a little better" but otherwise quickly resumed sleeping with questions/attempts to arose; no family at bedside at that time   - revisited later with same lethargy and no family at bedside  - will plan for continued GOC discussion directly with pt; and continued GOC with family if pt status if AMS does not improve during admit   - updated primary, Dr. Recinos     Palliative Consult  Date: 10/31/2022  - consult received   - interval chart reviewed in detail   - discussed pt during ICU MDT rounds  - met with patient and , Otis, at ICU bedside; introduction to palliative medicine team and role in current care and admission   - pt very lethargic today post HIDA scan and did not actively engage/participate in today's assessment and discussion, allowed to rest with plan for continued GOC/ACP discission with pt when more awake  - today's GOC/ACP discussion took place with pt's ; he shared more about pt outside of hospital; recognizes pt's increased need for hospital care recently and AMS associated with "being ill" but reports when "well" is active and more alert/oriented   -  seems " "to have good insight into pt's multiple life-limiting conditions including ESRD and CHF;  sites pt's age as main reason for desire for continued aggressive treatment of all reversible symptoms/conditions and desire for full code and resuscitation; associated risks and potential outcomes of aggressive resuscitation measures discussed;  advocates that "she is too young we at least have to try" but if no potential for recovery would not wish for prolonged life support for pt   - pt's lunch tray arrived after NPO status, and pt requiring full assistance with meals currently per Otis, allowed time for this with plan for continued GOC/ACP discussion when pt able to participate more   - emotional support provided   - Allowed time for questions/concerns; all addressed; expressed availability of myself/palliative team for additional questions/concerns     GOC  I engaged the family, john paul Berg, in a conversation about advance care planning and we specifically addressed what the goals of care would be moving forward, in light of the patient's change in clinical status, specifically ESRD and CHF.  Pt very lethargic today, will planned for continued GOC discussion with pt once able.  We did specifically address the patient's likely prognosis, which is poor.  We explored the patient's values and preferences for future care.  The family endorses that what is most important right now is to focus on symptom/pain control, extending life as long as possible, even it it means sacrificing quality and curative/life-prolongation (regardless of treatment burdens)    Accordingly, we have decided that the best plan to meet the patient's goals includes continuing with treatment    I did not explain the role for hospice care at this stage of the patient's illness, including its ability to help the patient live with the best quality of life possible, as it does not currently align with 's GOC, but will plan to discuss " "further as future option in care.  We will not be making a hospice referral.    Sepsis  Cholecystitis   - possibly related to cholecystitis; HIDA scan performed today   - IV abx initiated per primary   - if cholecystitis confirmed  advocated for GOC of surgery to treat if offered   - general surgery consulted/following   - noted; management per primary and general surgery     Chronic combined systolic and diastolic congestive heart failure  - new echo since admit showing worsening EF, now 35% from previous 35% 6/2022  - cardiology consulted/following   - will plan for further symptom burden and trajectory of disease discussion with pt and  when pt more awake   - noted; management per primary and cardiology     Chronic kidney disease-mineral and bone disorder  Anemia in ESRD (end-stage renal disease)  S/p nephrectomy left  - on HD; wishes to continue as long as possible per    - family condition; son is in 30s and also on HD; pt previously evaluated for transplant per , but pt decided against due to chronic conditions   - nephrology consulted/following   - noted; management per primary and nephrology     Pulmonary hypertension  Cardiomyopathy, nonischemic  PAF (paroxysmal atrial fibrillation)  ICD (implantable cardioverter-defibrillator) in place - SubQ  History of kidney cancer  - Chronic histories noted; management per primary     Thank you for your consult. I will follow-up with patient. Please contact us if you have any additional questions.    Subjective:     HPI:   From H&P: "57 year old female, with pertinent medical history of anemia, kidney cancer, CHF with EF of 35%,, HTN, Pafib on OAC,prediabetes, and myocardiopathy, presents to the ED to evaluate her worsening bilateral leg pain and weakness for 5 days. Patient's  states she was seen for the same symptoms 5 days ago as well as sore throat, chest pain, and nausea. She had labwork done and her results were all negative. " "Sore throat, chest pain, and nausea have resolved. Patient woke up this morning very weak and fatigued. She was late for dialysis and when she arrived, she was advised to come to the ED. Patient received her dialysis 2 days ago as normal. Patient was able to ambulate alone yesterday with pain, but needs support today.per ER record,  reports she has decreased appetite and is no longer producing urine. No pain medications taken today.  notes that a week ago, patient attended an event and possibly had sick contacts. Patient denies congestion, cough, trauma to legs,or other associated symptoms,patient is hypothermic in ER 93%,has elevated lactic acid,more than 12,has leucocytosis 14,patient is septic,can not gave IVF duo to  ESRD,has been  started on broad spectrum IV Abx.he has severe acidosis C O 2 of 8 and elevated Troponin 2.8,she denies chest apian dn abdominal pain,only complain is leg pain.she has also Hyperkalemia 6.0"    Palliative medicine consulted for advance care planning and continuity of care; Met with pt at bedside; for details of visit, see advance care planning section of plan.         Hospital Course:  No notes on file    Past Medical History:   Diagnosis Date    Anemia     Bronchitis 03/01/2017    Cancer 2016    kidney cancer    CHF (congestive heart failure), NYHA class II, chronic, systolic     CMV (cytomegalovirus) antibody positive     Essential hypertension 9/23/2015    H/O herpes simplex type 2 infection     Herpes simplex type 1 antibody positive     History of kidney cancer     s/p left nephrectomy 1/2016    Hyperparathyroidism, unspecified     Hyperuricemia without signs of inflammatory arthritis and tophaceous disease     Kidney stones     LGSIL (low grade squamous intraepithelial dysplasia)     Myocardiopathy 7/21/2017    Prediabetes     Proteinuria     Renal disorder     Thyroid nodule     Urate nephropathy        Past Surgical History:   Procedure " Laterality Date    BREAST CYST EXCISION      COLONOSCOPY N/A 11/12/2015    COLONOSCOPY N/A 3/12/2021    Procedure: COLONOSCOPY;  Surgeon: Brendon Lanier MD;  Location: [facility];  Service: Endoscopy;  Laterality: N/A;  covid test 3/9, labs prior, prep instr mailed -ml    INSERTION OF BIVENTRICULAR IMPLANTABLE CARDIOVERTER-DEFIBRILLATOR (ICD)  04/2021    NEPHRECTOMY-LAPAROSCOPIC Left 01/12/2016    PERITONEAL CATHETER INSERTION      Permacath insertion  01/12/2017    SALPINGOOPHORECTOMY Right 2016    KJB---DAVINCI    TONSILLECTOMY      TUBAL LIGATION         Review of patient's allergies indicates:   Allergen Reactions    Coreg [carvedilol] Other (See Comments)     Nausea/vomiting    Allopurinol      Other reaction(s): abnormal transaminases       Medications:  Continuous Infusions:  Scheduled Meds:   sodium chloride 0.9%   Intravenous Once    amLODIPine  10 mg Oral Daily    dextrose 10%  250 mL Intravenous Once    epoetin david-epbx  50 Units/kg Subcutaneous Every Mon, Wed, Fri    hydrALAZINE  100 mg Oral Q8H    lactulose  10 g Oral TID    metoprolol succinate  100 mg Oral Daily    mupirocin   Nasal BID    piperacillin-tazobactam (ZOSYN) IVPB  4.5 g Intravenous Q12H    rifAXImin  550 mg Oral BID    sacubitriL-valsartan  2 tablet Oral BID     PRN Meds:sodium chloride 0.9%, acetaminophen, hydrALAZINE, labetalol, sodium chloride 0.9%    Family History       Problem Relation (Age of Onset)    Diabetes Father, Maternal Grandfather    Heart disease Sister    Hypertension Mother    Kidney disease Father    No Known Problems Son, Son, Sister, Brother, Maternal Grandmother, Paternal Grandmother, Paternal Grandfather, Sister, Brother, Maternal Aunt, Maternal Uncle, Paternal Aunt, Paternal Uncle          Tobacco Use    Smoking status: Never    Smokeless tobacco: Never   Substance and Sexual Activity    Alcohol use: No     Comment: . 2 children. works at [employer].    Drug use: No    Sexual  activity: Yes     Partners: Male       Review of Systems   Unable to perform ROS: Other (lethargic/not talkative)   Objective:     Vital Signs (Most Recent):  Temp: 97.5 °F (36.4 °C) (11/01/22 1137)  Pulse: 80 (11/01/22 1137)  Resp: 17 (11/01/22 1137)  BP: 119/65 (11/01/22 1137)  SpO2: (!) 94 % (11/01/22 1137)   Vital Signs (24h Range):  Temp:  [95.6 °F (35.3 °C)-98 °F (36.7 °C)] 97.5 °F (36.4 °C)  Pulse:  [68-82] 80  Resp:  [12-32] 17  SpO2:  [92 %-100 %] 94 %  BP: (111-154)/() 119/65     Weight: 50 kg (110 lb 3.7 oz)  Body mass index is 18.92 kg/m².    Physical Exam  Constitutional:       Comments: Lethargic/resting (post HIDA scan)   HENT:      Head: Normocephalic and atraumatic.      Nose: Nose normal.   Pulmonary:      Effort: Pulmonary effort is normal. No respiratory distress.   Musculoskeletal:         General: Swelling present.      Right lower leg: No edema.      Left lower leg: No edema.   Neurological:      Comments: Unable to asses; pt would not awaken for visit       Significant Labs: All pertinent labs within the past 24 hours have been reviewed.    CBC:   Recent Labs   Lab 11/01/22 0110   WBC 10.04   HGB 10.0*   HCT 29.8*   MCV 99*   *       BMP:  Recent Labs   Lab 11/01/22 0110   GLU 70      K 3.5      CO2 28   BUN 30*   CREATININE 3.1*   CALCIUM 8.7       LFT:  Lab Results   Component Value Date     (H) 11/01/2022     (H) 10/12/2006    ALKPHOS 440 (H) 11/01/2022    BILITOT 4.9 (H) 11/01/2022     Albumin:   Albumin   Date Value Ref Range Status   11/01/2022 2.0 (L) 3.5 - 5.2 g/dL Final     Protein:   Total Protein   Date Value Ref Range Status   11/01/2022 4.9 (L) 6.0 - 8.4 g/dL Final     Lactic acid:   Lab Results   Component Value Date    LACTATE 1.5 10/30/2022    LACTATE 2.2 10/30/2022       Significant Imaging: I have reviewed all pertinent imaging results/findings within the past 24 hours.        Total visit time: 70 minutes    > 50% of  70  min visit  spent in chart review, face to face discussion of symptom assessment, coordination of care with other specialists, and discharge planning.    ACP time spent included in initial visit time: goals of care, emotional support, formulating and communicating prognosis, exploring burden/ benefit of various approaches of treatment.     Leila Munoz NP  Palliative Medicine  [facility] - [place], [place] - Intensive Care

## 2022-11-01 NOTE — ASSESSMENT & PLAN NOTE
Patient with Persistent (7 days or more) atrial fibrillation which is controlled currently with Beta Blocker. Patient is currently in sinus rhythm.[id number] Score: 1. HASBLED Score: . Anticoagulation indicated. Anticoagulation done with eliquis.  Resume Eliquis if no procedure planned.

## 2022-11-01 NOTE — SUBJECTIVE & OBJECTIVE
Past Medical History:   Diagnosis Date    Anemia     Bronchitis 03/01/2017    Cancer 2016    kidney cancer    CHF (congestive heart failure), NYHA class II, chronic, systolic     CMV (cytomegalovirus) antibody positive     Essential hypertension 9/23/2015    H/O herpes simplex type 2 infection     Herpes simplex type 1 antibody positive     History of kidney cancer     s/p left nephrectomy 1/2016    Hyperparathyroidism, unspecified     Hyperuricemia without signs of inflammatory arthritis and tophaceous disease     Kidney stones     LGSIL (low grade squamous intraepithelial dysplasia)     Myocardiopathy 7/21/2017    Prediabetes     Proteinuria     Renal disorder     Thyroid nodule     Urate nephropathy        Past Surgical History:   Procedure Laterality Date    BREAST CYST EXCISION      COLONOSCOPY N/A 11/12/2015    COLONOSCOPY N/A 3/12/2021    Procedure: COLONOSCOPY;  Surgeon: Brendon Lanier MD;  Location: [facility];  Service: Endoscopy;  Laterality: N/A;  covid test 3/9, labs prior, prep instr mailed -ml    INSERTION OF BIVENTRICULAR IMPLANTABLE CARDIOVERTER-DEFIBRILLATOR (ICD)  04/2021    NEPHRECTOMY-LAPAROSCOPIC Left 01/12/2016    PERITONEAL CATHETER INSERTION      Permacath insertion  01/12/2017    SALPINGOOPHORECTOMY Right 2016    KJB---DAVINCI    TONSILLECTOMY      TUBAL LIGATION         Review of patient's allergies indicates:   Allergen Reactions    Coreg [carvedilol] Other (See Comments)     Nausea/vomiting    Allopurinol      Other reaction(s): abnormal transaminases       Medications:  Continuous Infusions:  Scheduled Meds:   sodium chloride 0.9%   Intravenous Once    amLODIPine  10 mg Oral Daily    dextrose 10%  250 mL Intravenous Once    epoetin david-epbx  50 Units/kg Subcutaneous Every Mon, Wed, Fri    hydrALAZINE  100 mg Oral Q8H    lactulose  10 g Oral TID    metoprolol succinate  100 mg Oral Daily    mupirocin   Nasal BID    piperacillin-tazobactam (ZOSYN) IVPB  4.5 g Intravenous Q12H     rifAXImin  550 mg Oral BID    sacubitriL-valsartan  2 tablet Oral BID     PRN Meds:sodium chloride 0.9%, acetaminophen, hydrALAZINE, labetalol, sodium chloride 0.9%    Family History       Problem Relation (Age of Onset)    Diabetes Father, Maternal Grandfather    Heart disease Sister    Hypertension Mother    Kidney disease Father    No Known Problems Son, Son, Sister, Brother, Maternal Grandmother, Paternal Grandmother, Paternal Grandfather, Sister, Brother, Maternal Aunt, Maternal Uncle, Paternal Aunt, Paternal Uncle          Tobacco Use    Smoking status: Never    Smokeless tobacco: Never   Substance and Sexual Activity    Alcohol use: No     Comment: . 2 children. works at [employer].    Drug use: No    Sexual activity: Yes     Partners: Male       Review of Systems   Unable to perform ROS: Other (lethargic/not talkative)   Objective:     Vital Signs (Most Recent):  Temp: 97.5 °F (36.4 °C) (11/01/22 1137)  Pulse: 80 (11/01/22 1137)  Resp: 17 (11/01/22 1137)  BP: 119/65 (11/01/22 1137)  SpO2: (!) 94 % (11/01/22 1137)   Vital Signs (24h Range):  Temp:  [95.6 °F (35.3 °C)-98 °F (36.7 °C)] 97.5 °F (36.4 °C)  Pulse:  [68-82] 80  Resp:  [12-32] 17  SpO2:  [92 %-100 %] 94 %  BP: (111-154)/() 119/65     Weight: 50 kg (110 lb 3.7 oz)  Body mass index is 18.92 kg/m².    Physical Exam  Constitutional:       Comments: Lethargic/resting (post HIDA scan)   HENT:      Head: Normocephalic and atraumatic.      Nose: Nose normal.   Pulmonary:      Effort: Pulmonary effort is normal. No respiratory distress.   Musculoskeletal:         General: Swelling present.      Right lower leg: No edema.      Left lower leg: No edema.   Neurological:      Comments: Unable to asses; pt would not awaken for visit       Significant Labs: All pertinent labs within the past 24 hours have been reviewed.    CBC:   Recent Labs   Lab 11/01/22  0110   WBC 10.04   HGB 10.0*   HCT 29.8*   MCV 99*   *       BMP:  Recent Labs   Lab  11/01/22  0110   GLU 70      K 3.5      CO2 28   BUN 30*   CREATININE 3.1*   CALCIUM 8.7       LFT:  Lab Results   Component Value Date     (H) 11/01/2022     (H) 10/12/2006    ALKPHOS 440 (H) 11/01/2022    BILITOT 4.9 (H) 11/01/2022     Albumin:   Albumin   Date Value Ref Range Status   11/01/2022 2.0 (L) 3.5 - 5.2 g/dL Final     Protein:   Total Protein   Date Value Ref Range Status   11/01/2022 4.9 (L) 6.0 - 8.4 g/dL Final     Lactic acid:   Lab Results   Component Value Date    LACTATE 1.5 10/30/2022    LACTATE 2.2 10/30/2022       Significant Imaging: I have reviewed all pertinent imaging results/findings within the past 24 hours.

## 2022-11-01 NOTE — PROGRESS NOTES
Awake    NAD    Hungry     Some soreness in her abd not very localized    ROS    Denies cns ent cp gu rheum sx       Past Medical History:   Diagnosis Date    Anemia     Bronchitis 03/01/2017    Cancer 2016    kidney cancer    CHF (congestive heart failure), NYHA class II, chronic, systolic     CMV (cytomegalovirus) antibody positive     Essential hypertension 9/23/2015    H/O herpes simplex type 2 infection     Herpes simplex type 1 antibody positive     History of kidney cancer     s/p left nephrectomy 1/2016    Hyperparathyroidism, unspecified     Hyperuricemia without signs of inflammatory arthritis and tophaceous disease     Kidney stones     LGSIL (low grade squamous intraepithelial dysplasia)     Myocardiopathy 7/21/2017    Prediabetes     Proteinuria     Renal disorder     Thyroid nodule     Urate nephropathy      Past Surgical History:   Procedure Laterality Date    BREAST CYST EXCISION      COLONOSCOPY N/A 11/12/2015    COLONOSCOPY N/A 3/12/2021    Procedure: COLONOSCOPY;  Surgeon: Brendon Lanier MD;  Location: [facility];  Service: Endoscopy;  Laterality: N/A;  covid test 3/9, labs prior, prep instr mailed -ml    INSERTION OF BIVENTRICULAR IMPLANTABLE CARDIOVERTER-DEFIBRILLATOR (ICD)  04/2021    NEPHRECTOMY-LAPAROSCOPIC Left 01/12/2016    PERITONEAL CATHETER INSERTION      Permacath insertion  01/12/2017    SALPINGOOPHORECTOMY Right 2016    KJB---DAVINCI    TONSILLECTOMY      TUBAL LIGATION       Review of patient's allergies indicates:   Allergen Reactions    Coreg [carvedilol] Other (See Comments)     Nausea/vomiting    Allopurinol      Other reaction(s): abnormal transaminases       Current Facility-Administered Medications   Medication    0.9%  NaCl infusion    0.9%  NaCl infusion    acetaminophen tablet 650 mg    amLODIPine tablet 10 mg    dextrose 10% bolus 250 mL    epoetin david-epbx injection 2,520 Units    hydrALAZINE injection 20 mg    hydrALAZINE tablet 100 mg    labetaloL injection 10 mg     lactulose 20 gram/30 mL solution Soln 10 g    metoprolol succinate (TOPROL-XL) 24 hr tablet 100 mg    mupirocin 2 % ointment    piperacillin-tazobactam 4.5 g in dextrose 5 % 100 mL IVPB (ready to mix system)    rifAXIMin tablet 550 mg    sacubitriL-valsartan 24-26 mg per tablet 2 tablet    sodium chloride 0.9% bolus 250 mL       LABS    Recent Results (from the past 24 hour(s))   CBC auto differential    Collection Time: 11/01/22  1:10 AM   Result Value Ref Range    WBC 10.04 3.90 - 12.70 K/uL    RBC 3.00 (L) 4.00 - 5.40 M/uL    Hemoglobin 10.0 (L) 12.0 - 16.0 g/dL    Hematocrit 29.8 (L) 37.0 - 48.5 %    MCV 99 (H) 82 - 98 fL    MCH 33.3 (H) 27.0 - 31.0 pg    MCHC 33.6 32.0 - 36.0 g/dL    RDW 22.1 (H) 11.5 - 14.5 %    Platelets 117 (L) 150 - 450 K/uL    MPV 10.2 9.2 - 12.9 fL    Immature Granulocytes 0.7 (H) 0.0 - 0.5 %    Gran # (ANC) 9.6 (H) 1.8 - 7.7 K/uL    Immature Grans (Abs) 0.07 (H) 0.00 - 0.04 K/uL    Lymph # 0.0 (L) 1.0 - 4.8 K/uL    Mono # 0.3 0.3 - 1.0 K/uL    Eos # 0.1 0.0 - 0.5 K/uL    Baso # 0.01 0.00 - 0.20 K/uL    nRBC 17 (A) 0 /100 WBC    Gran % 95.7 (H) 38.0 - 73.0 %    Lymph % 0.0 (L) 18.0 - 48.0 %    Mono % 3.0 (L) 4.0 - 15.0 %    Eosinophil % 0.5 0.0 - 8.0 %    Basophil % 0.1 0.0 - 1.9 %    Aniso Slight     Poik Slight     Poly Occasional     Hypo Occasional     Target Cells Occasional     Differential Method Automated    Comprehensive metabolic panel    Collection Time: 11/01/22  1:10 AM   Result Value Ref Range    Sodium 139 136 - 145 mmol/L    Potassium 3.5 3.5 - 5.1 mmol/L    Chloride 101 95 - 110 mmol/L    CO2 28 23 - 29 mmol/L    Glucose 70 70 - 110 mg/dL    BUN 30 (H) 6 - 20 mg/dL    Creatinine 3.1 (H) 0.5 - 1.4 mg/dL    Calcium 8.7 8.7 - 10.5 mg/dL    Total Protein 4.9 (L) 6.0 - 8.4 g/dL    Albumin 2.0 (L) 3.5 - 5.2 g/dL    Total Bilirubin 4.9 (H) 0.1 - 1.0 mg/dL    Alkaline Phosphatase 440 (H) 55 - 135 U/L     (H) 10 - 40 U/L     (H) 10 - 44 U/L    Anion Gap 10 8 - 16  mmol/L    eGFR 17 (A) >60 mL/min/1.73 m^2   Ammonia    Collection Time: 11/01/22  5:05 AM   Result Value Ref Range    Ammonia 30 10 - 50 umol/L   ]    I/O last 3 completed shifts:  In: 1748.2 [P.O.:20; I.V.:1027.3; Other:500; IV Piggyback:200.9]  Out: 1501 [Other:1500; Stool:1]    Vitals:    11/01/22 0800 11/01/22 0827 11/01/22 0900 11/01/22 1000   BP: 137/64 137/64 131/63 128/69   Pulse: 76 77 76 76   Resp: 12  13 14   Temp: 98 °F (36.7 °C)      TempSrc: Axillary      SpO2: 98%  99% 98%   Weight:       Height:           No Jvd, Thyromegaly or Lymphadenopathy  Lungs: Fairly clear anteriorly and laterally but few diffuse faint lat rhales   Cor: RRR no G or rubs  Abd: Soft benign good bowel sounds non tender  Ext: No E C C    A)    ESRD  Sepsis, ? GB infection pain better no surgical intervention planned   CHF/CMP  Pulm htn   Anemia  2nd hyperpth  PAF  Lactic acidosis  HX of RCC sp nephrectomy     P)    HD TIW MWF   Epo prn  Nutritional support  Binders when able   Antibiotx as needed adjusted to renal fx       Reconsider entresto     Px guarded

## 2022-11-01 NOTE — PT/OT/SLP PROGRESS
Occupational Therapy      Patient Name:  Eva Bloom   MRN:  [id number]    Patient not seen today secondary to pt lethargic and unable to maintain arousal despite stimulation . Will follow-up as able.    11/1/2022

## 2022-11-01 NOTE — PT/OT/SLP PROGRESS
Physical Therapy      Patient Name:  Eva Bloom   MRN:  [id number]    Patient not seen today secondary to Patient fatigue (unable to maintain arousal). Will follow-up tomorrow.

## 2022-11-01 NOTE — SUBJECTIVE & OBJECTIVE
Interval History: No new complaints. Weak and lethargic    Review of Systems   HENT:  Negative for ear discharge and ear pain.    Eyes:  Negative for discharge and itching.   Endocrine: Negative for cold intolerance and heat intolerance.   Neurological:  Negative for seizures and syncope.   Objective:     Vital Signs (Most Recent):  Temp: 97.5 °F (36.4 °C) (11/01/22 1137)  Pulse: 80 (11/01/22 1137)  Resp: 17 (11/01/22 1137)  BP: 119/65 (11/01/22 1137)  SpO2: (!) 94 % (11/01/22 1137)   Vital Signs (24h Range):  Temp:  [95.6 °F (35.3 °C)-98 °F (36.7 °C)] 97.5 °F (36.4 °C)  Pulse:  [68-82] 80  Resp:  [12-32] 17  SpO2:  [92 %-100 %] 94 %  BP: (111-154)/() 119/65     Weight: 50 kg (110 lb 3.7 oz)  Body mass index is 18.92 kg/m².    Intake/Output Summary (Last 24 hours) at 11/1/2022 1413  Last data filed at 11/1/2022 0030  Gross per 24 hour   Intake 535.47 ml   Output 1500 ml   Net -964.53 ml      Physical Exam  Constitutional:       Appearance: She is ill-appearing. She is not diaphoretic.   HENT:      Head: Normocephalic.      Nose: Nose normal.      Mouth/Throat:      Mouth: Mucous membranes are dry.   Eyes:      Extraocular Movements: Extraocular movements intact.      Pupils: Pupils are equal, round, and reactive to light.   Cardiovascular:      Rate and Rhythm: Normal rate and regular rhythm.      Heart sounds: No murmur heard.  Pulmonary:      Effort: No respiratory distress.      Breath sounds: No wheezing.   Abdominal:      General: Abdomen is flat. There is no distension.      Palpations: Abdomen is soft.      Tenderness: There is no abdominal tenderness.   Musculoskeletal:         General: Swelling and tenderness present.      Cervical back: Normal range of motion and neck supple.   Skin:     Coloration: Skin is not jaundiced.      Findings: No bruising.   Neurological:      General: No focal deficit present.      Mental Status: She is lethargic.      Cranial Nerves: No cranial nerve deficit.      Motor:  No weakness.   Psychiatric:         Speech: Speech is delayed.         Behavior: Behavior is slowed.       Significant Labs: All pertinent labs within the past 24 hours have been reviewed.  BMP:   Recent Labs   Lab 11/01/22  0110   GLU 70      K 3.5      CO2 28   BUN 30*   CREATININE 3.1*   CALCIUM 8.7     CBC:   Recent Labs   Lab 10/30/22  2351 10/31/22  0840 11/01/22  0110   WBC 10.57 9.93 10.04   HGB 9.5* 9.4* 10.0*   HCT 28.2* 27.4* 29.8*    144* 117*       Significant Imaging: I have reviewed all pertinent imaging results/findings within the past 24 hours.

## 2022-11-01 NOTE — ASSESSMENT & PLAN NOTE
This patient does have evidence of infective focus  My overall impression is sepsis. Vital signs were reviewed and noted in progress note.  Antibiotics given-   Antibiotics (From admission, onward)    Start     Stop Route Frequency Ordered    10/30/22 2100  piperacillin-tazobactam 4.5 g in dextrose 5 % 100 mL IVPB (ready to mix system)         -- IV Every 12 hours (non-standard times) 10/30/22 1012    10/29/22 2100  rifAXIMin tablet 550 mg         -- Oral 2 times daily 10/29/22 1942    10/29/22 1245  mupirocin 2 % ointment         11/03 0859 Nasl 2 times daily 10/29/22 1140        Cultures were taken-   Microbiology Results (last 7 days)     Procedure Component Value Units Date/Time    Blood Culture #1 **CANNOT BE ORDERED STAT** [[id number]] Collected: 10/29/22 0941    Order Status: Completed Specimen: Blood from Peripheral, Hand, Right Updated: 10/31/22 1503     Blood Culture, Routine No Growth to date      No Growth to date      No Growth to date    Blood Culture #2 **CANNOT BE ORDERED STAT** [[id number]] Collected: 10/29/22 0915    Order Status: Completed Specimen: Blood from Peripheral, Hand, Right Updated: 10/31/22 1503     Blood Culture, Routine No Growth to date      No Growth to date      No Growth to date        Latest lactate reviewed, they are-  Recent Labs   Lab 10/29/22  1813 10/30/22  0146 10/30/22  0744   LACTATE 5.3* 2.2 1.5       Organ dysfunction indicated by Acute liver injury  Source- initially thought to be acute cholecystitis.  HIDA unable to eval gallbladder due to poor hepatic clearance. No indication for cholecystostomy drain as pain has improved per Surgery.    Source control Achieved by- IV Abx.    US abdomen show possible acute cholecystitis,on IV Abx ,surgery is consulted.denies abdominal pain..plan for HIDA scan,can not  have MRCP duo to AICD, and if is positive need IR consult for cholecystomy tube placement,not good surgical candidate,OAC is on hold.

## 2022-11-01 NOTE — PLAN OF CARE
Problem: Adult Inpatient Plan of Care  Goal: Plan of Care Review  Outcome: Ongoing, Progressing  Goal: Patient-Specific Goal (Individualized)  Outcome: Ongoing, Progressing  Goal: Absence of Hospital-Acquired Illness or Injury  Outcome: Ongoing, Progressing  Goal: Optimal Comfort and Wellbeing  Outcome: Ongoing, Progressing   Patient came from ICU, takes by mouth meds, receives diaysis

## 2022-11-02 LAB
AMMONIA PLAS-SCNC: 20 UMOL/L (ref 10–50)
BACTERIA BLD CULT: NORMAL
BACTERIA BLD CULT: NORMAL
HBV SURFACE AB SER QL IA: POSITIVE
HBV SURFACE AB SERPL IA-ACNC: 34 MIU/ML
POCT GLUCOSE: 94 MG/DL (ref 70–110)

## 2022-11-02 PROCEDURE — 63600175 PHARM REV CODE 636 W HCPCS: Performed by: HOSPITALIST

## 2022-11-02 PROCEDURE — 25000003 PHARM REV CODE 250: Performed by: HOSPITALIST

## 2022-11-02 PROCEDURE — 82140 ASSAY OF AMMONIA: CPT | Performed by: HOSPITALIST

## 2022-11-02 PROCEDURE — 97161 PT EVAL LOW COMPLEX 20 MIN: CPT

## 2022-11-02 PROCEDURE — 11000001 HC ACUTE MED/SURG PRIVATE ROOM

## 2022-11-02 PROCEDURE — 80100016 HC MAINTENANCE HEMODIALYSIS

## 2022-11-02 RX ORDER — FLUCONAZOLE 150 MG/1
150 TABLET ORAL ONCE
Status: COMPLETED | OUTPATIENT
Start: 2022-11-02 | End: 2022-11-02

## 2022-11-02 RX ADMIN — PIPERACILLIN AND TAZOBACTAM 4.5 G: 4; .5 INJECTION, POWDER, LYOPHILIZED, FOR SOLUTION INTRAVENOUS; PARENTERAL at 08:11

## 2022-11-02 RX ADMIN — MUPIROCIN: 20 OINTMENT TOPICAL at 09:11

## 2022-11-02 RX ADMIN — RIFAXIMIN 550 MG: 550 TABLET ORAL at 08:11

## 2022-11-02 RX ADMIN — HYDRALAZINE HYDROCHLORIDE 100 MG: 25 TABLET, FILM COATED ORAL at 04:11

## 2022-11-02 RX ADMIN — RIFAXIMIN 550 MG: 550 TABLET ORAL at 09:11

## 2022-11-02 RX ADMIN — PIPERACILLIN AND TAZOBACTAM 4.5 G: 4; .5 INJECTION, POWDER, LYOPHILIZED, FOR SOLUTION INTRAVENOUS; PARENTERAL at 09:11

## 2022-11-02 RX ADMIN — FLUCONAZOLE 150 MG: 150 TABLET ORAL at 03:11

## 2022-11-02 RX ADMIN — APIXABAN 2.5 MG: 2.5 TABLET, FILM COATED ORAL at 09:11

## 2022-11-02 RX ADMIN — EPOETIN ALFA-EPBX 2520 UNITS: 4000 INJECTION, SOLUTION INTRAVENOUS; SUBCUTANEOUS at 04:11

## 2022-11-02 RX ADMIN — MUPIROCIN: 20 OINTMENT TOPICAL at 08:11

## 2022-11-02 RX ADMIN — LACTULOSE 10 G: 10 SOLUTION ORAL at 08:11

## 2022-11-02 NOTE — PT/OT/SLP EVAL
Physical Therapy Evaluation    Patient Name:  Eva Bloom   MRN:  [id number]    Recommendations:     Discharge Recommendations:  Home with home health vs SNF   Discharge Equipment Recommendations: walker, rolling, bedside commode     Assessment:     Eva Bloom is a 57 y.o. female admitted with a medical diagnosis of Sepsis.  She presents with the following impairments/functional limitations:  weakness, impaired endurance, impaired functional mobility, gait instability, decreased lower extremity function, decreased upper extremity function .    Rehab Prognosis: Good; patient would benefit from acute skilled PT services to address these deficits and reach maximum level of function.    Recent Surgery: * No surgery found *      Plan:     During this hospitalization, patient to be seen 5 x/week to address the identified rehab impairments via gait training, therapeutic activities, therapeutic exercises and progress toward the following goals:    Plan of Care Expires:  11/09/22    Subjective     Chief Complaint: None  Patient/Family Comments/goals: Pt agreeable to PT evaluation.  Pain/Comfort:  Pain Rating 1: 0/10    Patients cultural, spiritual, L[employer] day conflicts given the current situation: no    Living Environment:  PTA pt lived with her  in a 1 story house with no steps to enter.  Prior to admission, patients level of function was independent.  Equipment used at home: none.  DME owned (not currently used): none.  Upon discharge, patient will have assistance from .    Objective:     Communicated with nurseArin prior to session.  Patient found supine with bed alarm, peripheral IV, telemetry  upon PT entry to room.    General Precautions: Standard,     Orthopedic Precautions:N/A   Braces: N/A  Respiratory Status: Room air    Exams:  Cognitive Exam:  Patient is oriented to Person and somewhat lethargic  RLE ROM: WFL except ankle; pt reports pain with DF  RLE Strength: grossly 2/5  LLE ROM: WFL  except ankle; pain reports pain with DF  LLE Strength: grossly 2/5    Functional Mobility:  Bed Mobility:     Supine to Sit: moderate assistance  Sit to Supine: maximal assistance  Transfers:     Sit to Stand:  maximal assistance and total assistance with no AD; patient assisted to scoot alongside bed to HOB with max A.      AM-PAC 6 CLICK MOBILITY  Total Score:10       Treatment & Education:  Pt instructed on/performed 1x10 reps BLE exs seated EOB including APs, LAQ, and marching.  Pt requested to use toilet but once BSC in place reports she thinks it's too late.  Transport present for patient to go to dialysis, nurse to get patient cleaned.    Patient left supine with all lines intact, call button in reach, and nurse present.    GOALS:   Multidisciplinary Problems       Physical Therapy Goals          Problem: Physical Therapy    Goal Priority Disciplines Outcome Goal Variances Interventions   Physical Therapy Goal     PT, PT/OT Ongoing, Progressing     Description: Goals to be met by: 22     Patient will increase functional independence with mobility by performin. Pt to be supervision with bed mobility.  2. Pt to transfer with supervision.  3. Pt to ambulate 50' w/RW SBA.  4. Pt to be (I) with written HEP.                         History:     Past Medical History:   Diagnosis Date    Anemia     Bronchitis 2017    Cancer 2016    kidney cancer    CHF (congestive heart failure), NYHA class II, chronic, systolic     CMV (cytomegalovirus) antibody positive     Essential hypertension 2015    H/O herpes simplex type 2 infection     Herpes simplex type 1 antibody positive     History of kidney cancer     s/p left nephrectomy 2016    Hyperparathyroidism, unspecified     Hyperuricemia without signs of inflammatory arthritis and tophaceous disease     Kidney stones     LGSIL (low grade squamous intraepithelial dysplasia)     Myocardiopathy 2017    Prediabetes     Proteinuria     Renal disorder      Thyroid nodule     Urate nephropathy        Past Surgical History:   Procedure Laterality Date    BREAST CYST EXCISION      COLONOSCOPY N/A 11/12/2015    COLONOSCOPY N/A 3/12/2021    Procedure: COLONOSCOPY;  Surgeon: Brendon Lanier MD;  Location: [facility];  Service: Endoscopy;  Laterality: N/A;  covid test 3/9, labs prior, prep instr mailed -ml    INSERTION OF BIVENTRICULAR IMPLANTABLE CARDIOVERTER-DEFIBRILLATOR (ICD)  04/2021    NEPHRECTOMY-LAPAROSCOPIC Left 01/12/2016    PERITONEAL CATHETER INSERTION      Permacath insertion  01/12/2017    SALPINGOOPHORECTOMY Right 2016    KJB---DAVINCI    TONSILLECTOMY      TUBAL LIGATION         Time Tracking:     PT Received On: 11/02/22  PT Start Time: 0907     PT Stop Time: 0921  PT Total Time (min): 14 min     Billable Minutes: Evaluation 14      11/02/2022

## 2022-11-02 NOTE — PLAN OF CARE
Problem: Adult Inpatient Plan of Care  Goal: Plan of Care Review  Outcome: Ongoing, Progressing  Goal: Optimal Comfort and Wellbeing  Outcome: Ongoing, Progressing  Goal: Readiness for Transition of Care  Outcome: Ongoing, Progressing     Problem: Infection  Goal: Absence of Infection Signs and Symptoms  Outcome: Ongoing, Progressing     Problem: Impaired Wound Healing  Goal: Optimal Wound Healing  Outcome: Ongoing, Progressing     Problem: Adjustment to Illness (Sepsis/Septic Shock)  Goal: Optimal Coping  Outcome: Ongoing, Progressing     Problem: Infection Progression (Sepsis/Septic Shock)  Goal: Absence of Infection Signs and Symptoms  Outcome: Ongoing, Progressing     Problem: Nutrition Impaired (Sepsis/Septic Shock)  Goal: Optimal Nutrition Intake  Outcome: Ongoing, Progressing     Problem: Skin Injury Risk Increased  Goal: Skin Health and Integrity  Outcome: Ongoing, Progressing

## 2022-11-02 NOTE — PROGRESS NOTES
Awake seen while on HD     NAD    Some soreness in her abd not very localized seems better today     ROS Denies cns ent cp gu rheum sx     Past Medical History:   Diagnosis Date    Anemia     Bronchitis 03/01/2017    Cancer 2016    kidney cancer    CHF (congestive heart failure), NYHA class II, chronic, systolic     CMV (cytomegalovirus) antibody positive     Essential hypertension 9/23/2015    H/O herpes simplex type 2 infection     Herpes simplex type 1 antibody positive     History of kidney cancer     s/p left nephrectomy 1/2016    Hyperparathyroidism, unspecified     Hyperuricemia without signs of inflammatory arthritis and tophaceous disease     Kidney stones     LGSIL (low grade squamous intraepithelial dysplasia)     Myocardiopathy 7/21/2017    Prediabetes     Proteinuria     Renal disorder     Thyroid nodule     Urate nephropathy      Past Surgical History:   Procedure Laterality Date    BREAST CYST EXCISION      COLONOSCOPY N/A 11/12/2015    COLONOSCOPY N/A 3/12/2021    Procedure: COLONOSCOPY;  Surgeon: Brendon Lanier MD;  Location: [facility];  Service: Endoscopy;  Laterality: N/A;  covid test 3/9, labs prior, prep instr mailed -ml    INSERTION OF BIVENTRICULAR IMPLANTABLE CARDIOVERTER-DEFIBRILLATOR (ICD)  04/2021    NEPHRECTOMY-LAPAROSCOPIC Left 01/12/2016    PERITONEAL CATHETER INSERTION      Permacath insertion  01/12/2017    SALPINGOOPHORECTOMY Right 2016    KJB---DAVINCI    TONSILLECTOMY      TUBAL LIGATION       Review of patient's allergies indicates:   Allergen Reactions    Coreg [carvedilol] Other (See Comments)     Nausea/vomiting    Allopurinol      Other reaction(s): abnormal transaminases       Current Facility-Administered Medications   Medication    0.9%  NaCl infusion    0.9%  NaCl infusion    acetaminophen tablet 650 mg    amLODIPine tablet 10 mg    dextrose 10% bolus 250 mL    epoetin david-epbx injection 2,520 Units    hydrALAZINE injection 20 mg    hydrALAZINE tablet 100 mg     labetaloL injection 10 mg    lactulose 20 gram/30 mL solution Soln 10 g    metoprolol succinate (TOPROL-XL) 24 hr tablet 100 mg    mupirocin 2 % ointment    piperacillin-tazobactam 4.5 g in dextrose 5 % 100 mL IVPB (ready to mix system)    rifAXIMin tablet 550 mg    sacubitriL-valsartan 24-26 mg per tablet 2 tablet    sodium chloride 0.9% bolus 250 mL       LABS    Recent Results (from the past 24 hour(s))   Ammonia    Collection Time: 11/02/22  4:55 AM   Result Value Ref Range    Ammonia 20 10 - 50 umol/L   ]    I/O last 3 completed shifts:  In: 740 [P.O.:240; Other:500]  Out: 1500 [Other:1500]    Vitals:    11/01/22 2117 11/01/22 2311 11/02/22 0410 11/02/22 0713   BP: (!) 114/57 (!) 95/59 111/63 (!) 117/56   Pulse:  79 80 78   Resp:  16 16 17   Temp:  97.2 °F (36.2 °C) 97.6 °F (36.4 °C) 97.6 °F (36.4 °C)   TempSrc:  Oral Oral Oral   SpO2:  95% 95% (!) 94%   Weight:       Height:           No Jvd, Thyromegaly or Lymphadenopathy  Lungs: Fairly clear anteriorly and laterally but few diffuse faint lat rhales   Cor: RRR no G or rubs  Abd: Soft benign good bowel sounds non tender  Ext: No E C C    A)    ESRD on HD MWF   Sepsis, ? GB infection pain better no surgical intervention planned   CHF/CMP  Pulm htn   Anemia  2nd hyperpth  PAF  Lactic acidosis  HX of RCC sp nephrectomy     P)    HD TIW MWF   Epo prn  Nutritional support  Binders when able   Antibiotx as needed adjusted to renal fx       Reconsider entresto     Px guarded

## 2022-11-02 NOTE — ASSESSMENT & PLAN NOTE
This patient does have evidence of infective focus  My overall impression is sepsis. Vital signs were reviewed and noted in progress note.  Antibiotics given-   Antibiotics (From admission, onward)    Start     Stop Route Frequency Ordered    10/30/22 2100  piperacillin-tazobactam 4.5 g in dextrose 5 % 100 mL IVPB (ready to mix system)         -- IV Every 12 hours (non-standard times) 10/30/22 1012    10/29/22 2100  rifAXIMin tablet 550 mg         -- Oral 2 times daily 10/29/22 1942    10/29/22 1245  mupirocin 2 % ointment         11/03 0859 Nasl 2 times daily 10/29/22 1140        Cultures were taken-   Microbiology Results (last 7 days)     Procedure Component Value Units Date/Time    Blood Culture #1 **CANNOT BE ORDERED STAT** [[id number]] Collected: 10/29/22 0941    Order Status: Completed Specimen: Blood from Peripheral, Hand, Right Updated: 11/02/22 1503     Blood Culture, Routine No Growth after 4 days.    Blood Culture #2 **CANNOT BE ORDERED STAT** [[id number]] Collected: 10/29/22 0915    Order Status: Completed Specimen: Blood from Peripheral, Hand, Right Updated: 11/02/22 1503     Blood Culture, Routine No Growth after 4 days.        Latest lactate reviewed, they are-  No results for input(s): LACTATE in the last 72 hours.    Organ dysfunction indicated by Acute liver injury  Source- initially thought to be acute cholecystitis.  HIDA unable to eval gallbladder due to poor hepatic clearance. No indication for cholecystostomy drain as pain has improved per Surgery.  Unsure about actual source.    May stop ABx's soon if no source.    Source control Achieved by- IV Abx.

## 2022-11-02 NOTE — PLAN OF CARE
Problem: Physical Therapy  Goal: Physical Therapy Goal  Description: Goals to be met by: 22     Patient will increase functional independence with mobility by performin. Pt to be supervision with bed mobility.  2. Pt to transfer with supervision.  3. Pt to ambulate 50' w/RW SBA.  4. Pt to be (I) with written HEP.    Outcome: Ongoing, Progressing   Initial eval completed.  See in chart for details.

## 2022-11-02 NOTE — PROGRESS NOTES
[facility] Medicine  Progress Note    Patient Name: Eva Bloom  MRN: [id number]  Patient Class: IP- Inpatient   Admission Date: 10/29/2022  Length of Stay: 4 days  Attending Physician: Rolf Recinos MD  Primary Care Provider: Sowmya Mccain MD        Subjective:     Principal Problem:Sepsis        HPI:  57 year old female, with pertinent medical history of anemia, kidney cancer, CHF with EF of 35%,, HTN, Pafib on OAC,prediabetes, and myocardiopathy, presents to the ED to evaluate her worsening bilateral leg pain and weakness for 5 days. Patient's  states she was seen for the same symptoms 5 days ago as well as sore throat, chest pain, and nausea. She had labwork done and her results were all negative. Sore throat, chest pain, and nausea have resolved. Patient woke up this morning very weak and fatigued. She was late for dialysis and when she arrived, she was advised to come to the ED. Patient received her dialysis 2 days ago as normal. Patient was able to ambulate alone yesterday with pain, but needs support today.per ER record,  reports she has decreased appetite and is no longer producing urine. No pain medications taken today.  notes that a week ago, patient attended an event and possibly had sick contacts. Patient denies congestion, cough, trauma to legs,or other associated symptoms,patient is hypothermic in ER 93%,has elevated lactic acid,more than 12,has leucocytosis 14,patient is septic,can not gave IVF duo to  ESRD,has been  started on broad spectrum IV Abx.he has severe acidosis C O 2 of 8 and elevated Troponin 2.8,she denies chest apian dn abdominal pain,only complain is leg pain.she has also Hyperkalemia 6.0.      Overview/Hospital Course:  57 year old female presents to the ED to evaluate her worsening bilateral leg pain and weakness for 5 days.  Patient was hypothermic in ER with lactic acidosis.  Started on Bicarb drip and underwent HD.  Started on  broad spectrum ABx's.  Elevated Troponin and Cardiology consulted.  Echo shows EF of 25%.  Denies any chest pain and no plans for intervention at this time.  US abdomen shows possible acute cholecystitis.  Surgery consulted.  HIDA unable to eval gallbladder due to poor hepatic clearance. No indication for cholecystostomy drain as pain has improved.  PT/OT consulted.      Interval History: more awake today.  Having diarrhea secondary to lactulose.    Review of Systems   HENT:  Negative for ear discharge and ear pain.    Eyes:  Negative for discharge and itching.   Endocrine: Negative for cold intolerance and heat intolerance.   Neurological:  Negative for seizures and syncope.   Objective:     Vital Signs (Most Recent):  Temp: 97.6 °F (36.4 °C) (11/02/22 1000)  Pulse: 80 (11/02/22 1400)  Resp: 18 (11/02/22 1400)  BP: (!) 105/56 (11/02/22 1400)  SpO2: (!) 94 % (11/02/22 0713)   Vital Signs (24h Range):  Temp:  [97.2 °F (36.2 °C)-97.6 °F (36.4 °C)] 97.6 °F (36.4 °C)  Pulse:  [73-80] 80  Resp:  [16-21] 18  SpO2:  [92 %-95 %] 94 %  BP: ()/(50-63) 105/56     Weight: 50 kg (110 lb 3.7 oz)  Body mass index is 18.92 kg/m².    Intake/Output Summary (Last 24 hours) at 11/2/2022 1516  Last data filed at 11/2/2022 1400  Gross per 24 hour   Intake 360 ml   Output 1100 ml   Net -740 ml        Physical Exam  Constitutional:       Appearance: She is ill-appearing. She is not diaphoretic.   HENT:      Head: Normocephalic.      Nose: Nose normal.      Mouth/Throat:      Mouth: Mucous membranes are dry.   Eyes:      Extraocular Movements: Extraocular movements intact.      Pupils: Pupils are equal, round, and reactive to light.   Cardiovascular:      Rate and Rhythm: Normal rate and regular rhythm.      Heart sounds: No murmur heard.  Pulmonary:      Effort: No respiratory distress.      Breath sounds: No wheezing.   Abdominal:      General: Abdomen is flat. There is no distension.      Palpations: Abdomen is soft.       Tenderness: There is no abdominal tenderness.   Musculoskeletal:         General: Swelling and tenderness present.      Cervical back: Normal range of motion and neck supple.   Skin:     Coloration: Skin is not jaundiced.      Findings: No bruising.   Neurological:      General: No focal deficit present.      Cranial Nerves: No cranial nerve deficit.      Motor: No weakness.   Psychiatric:         Speech: Speech is delayed.         Behavior: Behavior is slowed.       Significant Labs: All pertinent labs within the past 24 hours have been reviewed.  BMP:   Recent Labs   Lab 11/01/22  0110   GLU 70      K 3.5      CO2 28   BUN 30*   CREATININE 3.1*   CALCIUM 8.7       CBC:   Recent Labs   Lab 11/01/22  0110   WBC 10.04   HGB 10.0*   HCT 29.8*   *         Significant Imaging: I have reviewed all pertinent imaging results/findings within the past 24 hours.      Assessment/Plan:      * Sepsis  This patient does have evidence of infective focus  My overall impression is sepsis. Vital signs were reviewed and noted in progress note.  Antibiotics given-   Antibiotics (From admission, onward)    Start     Stop Route Frequency Ordered    10/30/22 2100  piperacillin-tazobactam 4.5 g in dextrose 5 % 100 mL IVPB (ready to mix system)         -- IV Every 12 hours (non-standard times) 10/30/22 1012    10/29/22 2100  rifAXIMin tablet 550 mg         -- Oral 2 times daily 10/29/22 1942    10/29/22 1245  mupirocin 2 % ointment         11/03 0859 Nasl 2 times daily 10/29/22 1140        Cultures were taken-   Microbiology Results (last 7 days)     Procedure Component Value Units Date/Time    Blood Culture #1 **CANNOT BE ORDERED STAT** [[id number]] Collected: 10/29/22 0941    Order Status: Completed Specimen: Blood from Peripheral, Hand, Right Updated: 11/02/22 1503     Blood Culture, Routine No Growth after 4 days.    Blood Culture #2 **CANNOT BE ORDERED STAT** [[id number]] Collected: 10/29/22 0915    Order Status:  Completed Specimen: Blood from Peripheral, Hand, Right Updated: 11/02/22 1503     Blood Culture, Routine No Growth after 4 days.        Latest lactate reviewed, they are-  No results for input(s): LACTATE in the last 72 hours.    Organ dysfunction indicated by Acute liver injury  Source- initially thought to be acute cholecystitis.  HIDA unable to eval gallbladder due to poor hepatic clearance. No indication for cholecystostomy drain as pain has improved per Surgery.  Unsure about actual source.    May stop ABx's soon if no source.    Source control Achieved by- IV Abx.          Transaminitis  Suspect to liver congestion,will monitor,hepatitis panel  is negative,,on lactulose for elevated ammonia.  GI following      Elevated troponin  Denies chest pain,will monitor,checked Echo,has EF of 25%,cardiology is on case,.she denies chest pain.no pan for intervention at this time.      PAF (paroxysmal atrial fibrillation)  Patient with Persistent (7 days or more) atrial fibrillation which is controlled currently with Beta Blocker. Patient is currently in sinus rhythm.[id number] Score: 1. HASBLED Score: . Anticoagulation indicated. Anticoagulation done with eliquis.  Resume Eliquis        ICD (implantable cardioverter-defibrillator) in place - SubQ  Will monitor.      Chronic kidney disease-mineral and bone disorder  On HD.      Anemia in ESRD (end-stage renal disease)  Will monitor.      Chronic combined systolic and diastolic congestive heart failure        Nonrheumatic mitral valve regurgitation  Per record,      ESRD (end stage renal disease) on dialysis  Nephrology consulted for HD      Pulmonary hypertension  per record.      Cardiomyopathy, nonischemic  Echo shows EF of 25%      S/p nephrectomy left  Per record.      History of kidney cancer  per record.      Essential hypertension  Resumed home medications..        VTE Risk Mitigation (From admission, onward)         Ordered     apixaban tablet 2.5 mg  2 times daily          11/02/22 1520     Place sequential compression device  Until discontinued         11/01/22 0945                Discharge Planning   MAVIS: 11/5/2022     Code Status: Full Code   Is the patient medically ready for discharge?:     Reason for patient still in hospital (select all that apply): Treatment  Discharge Plan A: Home Health (Not sure if she wants home health at this time.  Normally able to perform ADLs per spouse.)                  Rolf Recinos MD  Department of Hospital Medicine   [facility]

## 2022-11-02 NOTE — PLAN OF CARE
Problem: Adult Inpatient Plan of Care  Goal: Plan of Care Review  11/2/2022 1811 by Julita Shafer RN  Outcome: Ongoing, Progressing  11/2/2022 1750 by Julita Shafer RN  Outcome: Ongoing, Progressing  Goal: Patient-Specific Goal (Individualized)  11/2/2022 1811 by Julita Shafer RN  Outcome: Ongoing, Progressing  11/2/2022 1750 by Julita Shafer RN  Outcome: Ongoing, Progressing  Goal: Absence of Hospital-Acquired Illness or Injury  11/2/2022 1811 by Julita Shafer RN  Outcome: Ongoing, Progressing  11/2/2022 1750 by Julita Shafer RN  Outcome: Ongoing, Progressing  Goal: Optimal Comfort and Wellbeing  11/2/2022 1811 by Julita Shafer RN  Outcome: Ongoing, Progressing  11/2/2022 1750 by Julita Shafer RN  Outcome: Ongoing, Progressing  Goal: Readiness for Transition of Care  11/2/2022 1811 by Julita Shafer RN  Outcome: Ongoing, Progressing  11/2/2022 1750 by Julita Shafer RN  Outcome: Ongoing, Progressing     Problem: Infection  Goal: Absence of Infection Signs and Symptoms  11/2/2022 1811 by Julita Shafer RN  Outcome: Ongoing, Progressing  11/2/2022 1750 by Julita Shafer RN  Outcome: Ongoing, Progressing     Problem: Impaired Wound Healing  Goal: Optimal Wound Healing  11/2/2022 1811 by Julita Shafer RN  Outcome: Ongoing, Progressing  11/2/2022 1750 by Julita Shafer RN  Outcome: Ongoing, Progressing     Problem: Adjustment to Illness (Sepsis/Septic Shock)  Goal: Optimal Coping  11/2/2022 1811 by Julita Shafer RN  Outcome: Ongoing, Progressing  11/2/2022 1750 by Julita Shafer RN  Outcome: Ongoing, Progressing     Problem: Bleeding (Sepsis/Septic Shock)  Goal: Absence of Bleeding  Outcome: Ongoing, Progressing     Problem: Glycemic Control Impaired (Sepsis/Septic Shock)  Goal: Blood Glucose Level Within Desired Range  Outcome: Ongoing, Progressing     Problem: Infection Progression (Sepsis/Septic Shock)  Goal: Absence of  Infection Signs and Symptoms  Outcome: Ongoing, Progressing     Problem: Nutrition Impaired (Sepsis/Septic Shock)  Goal: Optimal Nutrition Intake  Outcome: Ongoing, Progressing     Problem: Skin Injury Risk Increased  Goal: Skin Health and Integrity  Outcome: Ongoing, Progressing     Problem: Device-Related Complication Risk (Hemodialysis)  Goal: Safe, Effective Therapy Delivery  Outcome: Ongoing, Progressing     Problem: Hemodynamic Instability (Hemodialysis)  Goal: Effective Tissue Perfusion  Outcome: Ongoing, Progressing     Problem: Infection (Hemodialysis)  Goal: Absence of Infection Signs and Symptoms  Outcome: Ongoing, Progressing     Problem: Coping Ineffective  Goal: Effective Coping  Outcome: Ongoing, Progressing   Patient received dialysis, 600 ml removed, spouse at bedside

## 2022-11-02 NOTE — SUBJECTIVE & OBJECTIVE
Interval History: more awake today.  Having diarrhea secondary to lactulose.    Review of Systems   HENT:  Negative for ear discharge and ear pain.    Eyes:  Negative for discharge and itching.   Endocrine: Negative for cold intolerance and heat intolerance.   Neurological:  Negative for seizures and syncope.   Objective:     Vital Signs (Most Recent):  Temp: 97.6 °F (36.4 °C) (11/02/22 1000)  Pulse: 80 (11/02/22 1400)  Resp: 18 (11/02/22 1400)  BP: (!) 105/56 (11/02/22 1400)  SpO2: (!) 94 % (11/02/22 0713)   Vital Signs (24h Range):  Temp:  [97.2 °F (36.2 °C)-97.6 °F (36.4 °C)] 97.6 °F (36.4 °C)  Pulse:  [73-80] 80  Resp:  [16-21] 18  SpO2:  [92 %-95 %] 94 %  BP: ()/(50-63) 105/56     Weight: 50 kg (110 lb 3.7 oz)  Body mass index is 18.92 kg/m².    Intake/Output Summary (Last 24 hours) at 11/2/2022 1516  Last data filed at 11/2/2022 1400  Gross per 24 hour   Intake 360 ml   Output 1100 ml   Net -740 ml        Physical Exam  Constitutional:       Appearance: She is ill-appearing. She is not diaphoretic.   HENT:      Head: Normocephalic.      Nose: Nose normal.      Mouth/Throat:      Mouth: Mucous membranes are dry.   Eyes:      Extraocular Movements: Extraocular movements intact.      Pupils: Pupils are equal, round, and reactive to light.   Cardiovascular:      Rate and Rhythm: Normal rate and regular rhythm.      Heart sounds: No murmur heard.  Pulmonary:      Effort: No respiratory distress.      Breath sounds: No wheezing.   Abdominal:      General: Abdomen is flat. There is no distension.      Palpations: Abdomen is soft.      Tenderness: There is no abdominal tenderness.   Musculoskeletal:         General: Swelling and tenderness present.      Cervical back: Normal range of motion and neck supple.   Skin:     Coloration: Skin is not jaundiced.      Findings: No bruising.   Neurological:      General: No focal deficit present.      Cranial Nerves: No cranial nerve deficit.      Motor: No weakness.    Psychiatric:         Speech: Speech is delayed.         Behavior: Behavior is slowed.       Significant Labs: All pertinent labs within the past 24 hours have been reviewed.  BMP:   Recent Labs   Lab 11/01/22 0110   GLU 70      K 3.5      CO2 28   BUN 30*   CREATININE 3.1*   CALCIUM 8.7       CBC:   Recent Labs   Lab 11/01/22 0110   WBC 10.04   HGB 10.0*   HCT 29.8*   *         Significant Imaging: I have reviewed all pertinent imaging results/findings within the past 24 hours.

## 2022-11-02 NOTE — ASSESSMENT & PLAN NOTE
Patient with Persistent (7 days or more) atrial fibrillation which is controlled currently with Beta Blocker. Patient is currently in sinus rhythm.[id number] Score: 1. HASBLED Score: . Anticoagulation indicated. Anticoagulation done with eliquis.  Resume Eliquis

## 2022-11-02 NOTE — PT/OT/SLP PROGRESS
Occupational Therapy      Patient Name:  Eva Bloom   MRN:  [id number]    9:51 AM: Patient not seen today secondary to dialysis.  Will follow-up tomorrow or as appropriate.      11/2/2022

## 2022-11-03 LAB
AFP SERPL-MCNC: 7.1 NG/ML (ref 0–8.4)
ALBUMIN SERPL BCP-MCNC: 1.8 G/DL (ref 3.5–5.2)
ALP SERPL-CCNC: 449 U/L (ref 55–135)
ALT SERPL W/O P-5'-P-CCNC: 145 U/L (ref 10–44)
ANION GAP SERPL CALC-SCNC: 11 MMOL/L (ref 8–16)
AST SERPL-CCNC: 268 U/L (ref 10–40)
BASOPHILS # BLD AUTO: 0.01 K/UL (ref 0–0.2)
BASOPHILS NFR BLD: 0.1 % (ref 0–1.9)
BILIRUB SERPL-MCNC: 3.5 MG/DL (ref 0.1–1)
BUN SERPL-MCNC: 30 MG/DL (ref 6–20)
CALCIUM SERPL-MCNC: 8.5 MG/DL (ref 8.7–10.5)
CHLORIDE SERPL-SCNC: 104 MMOL/L (ref 95–110)
CO2 SERPL-SCNC: 26 MMOL/L (ref 23–29)
CREAT SERPL-MCNC: 3.6 MG/DL (ref 0.5–1.4)
DIFFERENTIAL METHOD: ABNORMAL
EOSINOPHIL # BLD AUTO: 0.2 K/UL (ref 0–0.5)
EOSINOPHIL NFR BLD: 2.5 % (ref 0–8)
ERYTHROCYTE [DISTWIDTH] IN BLOOD BY AUTOMATED COUNT: 23.7 % (ref 11.5–14.5)
EST. GFR  (NO RACE VARIABLE): 14 ML/MIN/1.73 M^2
GLUCOSE SERPL-MCNC: 69 MG/DL (ref 70–110)
HCT VFR BLD AUTO: 25.5 % (ref 37–48.5)
HGB BLD-MCNC: 8.3 G/DL (ref 12–16)
IMM GRANULOCYTES # BLD AUTO: 0.05 K/UL (ref 0–0.04)
IMM GRANULOCYTES NFR BLD AUTO: 0.5 % (ref 0–0.5)
LYMPHOCYTES # BLD AUTO: 0.3 K/UL (ref 1–4.8)
LYMPHOCYTES NFR BLD: 3.1 % (ref 18–48)
MCH RBC QN AUTO: 33.3 PG (ref 27–31)
MCHC RBC AUTO-ENTMCNC: 32.5 G/DL (ref 32–36)
MCV RBC AUTO: 102 FL (ref 82–98)
MONOCYTES # BLD AUTO: 0.6 K/UL (ref 0.3–1)
MONOCYTES NFR BLD: 6.3 % (ref 4–15)
NEUTROPHILS # BLD AUTO: 8.2 K/UL (ref 1.8–7.7)
NEUTROPHILS NFR BLD: 87.5 % (ref 38–73)
NRBC BLD-RTO: 2 /100 WBC
PLATELET # BLD AUTO: 67 K/UL (ref 150–450)
PMV BLD AUTO: 10.7 FL (ref 9.2–12.9)
POTASSIUM SERPL-SCNC: 3.3 MMOL/L (ref 3.5–5.1)
PROT SERPL-MCNC: 5.1 G/DL (ref 6–8.4)
RBC # BLD AUTO: 2.49 M/UL (ref 4–5.4)
SODIUM SERPL-SCNC: 141 MMOL/L (ref 136–145)
WBC # BLD AUTO: 9.38 K/UL (ref 3.9–12.7)

## 2022-11-03 PROCEDURE — 97535 SELF CARE MNGMENT TRAINING: CPT

## 2022-11-03 PROCEDURE — 63600175 PHARM REV CODE 636 W HCPCS: Performed by: HOSPITALIST

## 2022-11-03 PROCEDURE — 25000003 PHARM REV CODE 250: Performed by: HOSPITALIST

## 2022-11-03 PROCEDURE — 97165 OT EVAL LOW COMPLEX 30 MIN: CPT

## 2022-11-03 PROCEDURE — 99233 PR SUBSEQUENT HOSPITAL CARE,LEVL III: ICD-10-PCS | Mod: ,,, | Performed by: NURSE PRACTITIONER

## 2022-11-03 PROCEDURE — 85025 COMPLETE CBC W/AUTO DIFF WBC: CPT | Performed by: HOSPITALIST

## 2022-11-03 PROCEDURE — 80053 COMPREHEN METABOLIC PANEL: CPT | Performed by: HOSPITALIST

## 2022-11-03 PROCEDURE — 99233 SBSQ HOSP IP/OBS HIGH 50: CPT | Mod: ,,, | Performed by: NURSE PRACTITIONER

## 2022-11-03 PROCEDURE — 97530 THERAPEUTIC ACTIVITIES: CPT

## 2022-11-03 PROCEDURE — 82105 ALPHA-FETOPROTEIN SERUM: CPT | Performed by: NURSE PRACTITIONER

## 2022-11-03 PROCEDURE — 11000001 HC ACUTE MED/SURG PRIVATE ROOM

## 2022-11-03 RX ORDER — ONDANSETRON 2 MG/ML
8 INJECTION INTRAMUSCULAR; INTRAVENOUS EVERY 6 HOURS PRN
Status: DISCONTINUED | OUTPATIENT
Start: 2022-11-03 | End: 2022-11-06 | Stop reason: HOSPADM

## 2022-11-03 RX ORDER — ACETAMINOPHEN 325 MG/1
650 TABLET ORAL EVERY 4 HOURS PRN
Status: DISCONTINUED | OUTPATIENT
Start: 2022-11-03 | End: 2022-11-06 | Stop reason: HOSPADM

## 2022-11-03 RX ORDER — POLYETHYLENE GLYCOL 3350 17 G/17G
17 POWDER, FOR SOLUTION ORAL 2 TIMES DAILY PRN
Status: DISCONTINUED | OUTPATIENT
Start: 2022-11-03 | End: 2022-11-06 | Stop reason: HOSPADM

## 2022-11-03 RX ADMIN — RIFAXIMIN 550 MG: 550 TABLET ORAL at 09:11

## 2022-11-03 RX ADMIN — APIXABAN 2.5 MG: 2.5 TABLET, FILM COATED ORAL at 09:11

## 2022-11-03 RX ADMIN — PIPERACILLIN AND TAZOBACTAM 4.5 G: 4; .5 INJECTION, POWDER, LYOPHILIZED, FOR SOLUTION INTRAVENOUS; PARENTERAL at 09:11

## 2022-11-03 RX ADMIN — HYDRALAZINE HYDROCHLORIDE 100 MG: 25 TABLET, FILM COATED ORAL at 09:11

## 2022-11-03 RX ADMIN — SACUBITRIL AND VALSARTAN 2 TABLET: 24; 26 TABLET, FILM COATED ORAL at 09:11

## 2022-11-03 NOTE — PLAN OF CARE
Problem: Occupational Therapy  Goal: Occupational Therapy Goal  Description: Goals to be met by: 11/17/22     Patient will increase functional independence with ADLs by performing:    UE Dressing with Modified [place].  LE Dressing with Stand-by Assistance.  Grooming while seated with Modified [place].  Toileting from bedside commode with Stand-by Assistance and Assistive Devices as needed for hygiene and clothing management.   Rolling to Bilateral with Modified [place].   Supine to sit with Modified [place].  Step transfer with Contact Guard Assistance  Toilet transfer to bedside commode with Contact Guard Assistance.  Upper extremity exercise program x15 reps per handout, with independence.    Outcome: Ongoing, Progressing   The patient presents with generalized weakness and requires assist to stand and transfer to a chair.The patient may benefit from In Patient Rehab.  If D/C to home, the patient will benefit from HH OT, a W/C and a BSC.

## 2022-11-03 NOTE — PT/OT/SLP PROGRESS
Physical Therapy      Patient Name:  Eva Bloom   MRN:  [id number]    Patient not seen today secondary to Other (Comment) (asleep); nurse assisted pt back to bed /p lunch and cleaned her up per  who is present in room.   Discussed POC and discharge.  expressed his preference of discharge to home with home health and requesting hospital bed.  Pt's  provided with written HEP as he reports he tries to exercise with pt in the evening; reviewed booklet with him.  PT to attempt again tomorrow.

## 2022-11-03 NOTE — PROGRESS NOTES
[facility]    Reason for consult: transaminitis    PCP:   Sowmya Mccain       LOS: 5        Initial History of Present Illness (HPI):  This is a 57 y.o. female consulted to GI service for transaminitis. Patient lethargic and unable to hold conversation for history interview. Some information from , most from chart.  Patient with acute onset of generalized weakness 3 days ago.  reports she has decreased appetite and is no longer producing urine.  notes that a week ago, patient attended an event and possibly had sick contacts.   Lab results with elevated lfts, bili, alk phos. US of abdomen shows cholelithiasis with cholecystitis, hepatic lesion with minimal flow.  Today lethargic, yellowed sclera, tolerating regular diet. S/p HIDA scan, awaiting results.     Interval Hx  Awake and alert, diet intake increased. LFTs trending down.      Review of Systems   Constitutional:  Positive for activity change, appetite change and fatigue. Negative for chills, diaphoresis and fever.   HENT:  Negative for congestion, drooling, rhinorrhea, sore throat and trouble swallowing.    Eyes:  Negative for discharge.   Respiratory:  Negative for cough, shortness of breath and wheezing.    Cardiovascular:  Negative for chest pain, palpitations and leg swelling.   Gastrointestinal:  Negative for abdominal distention, abdominal pain, anal bleeding, blood in stool, constipation, diarrhea, nausea, rectal pain and vomiting.   Endocrine: Negative for cold intolerance and heat intolerance.   Genitourinary:  Negative for frequency, hematuria and urgency.   Musculoskeletal:  Negative for arthralgias.   Skin:  Negative for color change, pallor and rash.   Neurological:  Positive for weakness. Negative for syncope, facial asymmetry and numbness.   Psychiatric/Behavioral:  Negative for agitation and confusion. The patient is not nervous/anxious.          Medical History:  has a past medical history  of Anemia, Bronchitis (03/01/2017), Cancer (2016), CHF (congestive heart failure), NYHA class II, chronic, systolic, CMV (cytomegalovirus) antibody positive, Essential hypertension (9/23/2015), H/O herpes simplex type 2 infection, Herpes simplex type 1 antibody positive, History of kidney cancer, Hyperparathyroidism, unspecified, Hyperuricemia without signs of inflammatory arthritis and tophaceous disease, Kidney stones, LGSIL (low grade squamous intraepithelial dysplasia), Myocardiopathy (7/21/2017), Prediabetes, Proteinuria, Renal disorder, Thyroid nodule, and Urate nephropathy.    Surgical History:  has a past surgical history that includes Tonsillectomy; Tubal ligation; Colonoscopy (N/A, 11/12/2015); Nephrectomy-Laparoscopic-Donor (Left, 01/12/2016); Peritoneal catheter insertion; Salpingoophorectomy (Right, 2016); Permacath insertion (01/12/2017); Breast cyst excision; Colonoscopy (N/A, 3/12/2021); and Insertion of biventricular implantable cardioverter-defibrillator (ICD) (04/2021).    Family History: family history includes Diabetes in her father and maternal grandfather; Heart disease in her sister; Hypertension in her mother; Kidney disease in her father; No Known Problems in her brother, brother, maternal aunt, maternal grandmother, maternal uncle, paternal aunt, paternal grandfather, paternal grandmother, paternal uncle, sister, sister, son, and son..     Social History:  reports that she has never smoked. She has never used smokeless tobacco. She reports that she does not drink alcohol and does not use drugs.    Review of patient's allergies indicates:   Allergen Reactions    Coreg [carvedilol] Other (See Comments)     Nausea/vomiting    Allopurinol      Other reaction(s): abnormal transaminases       No current facility-administered medications on file prior to encounter.     Current Outpatient Medications on File Prior to Encounter   Medication Sig Dispense Refill    acetaminophen (TYLENOL) 500 MG  tablet Take 500 mg by mouth every 6 (six) hours as needed for Pain.      apixaban (ELIQUIS) 2.5 mg Tab Take 1 tablet (2.5 mg total) by mouth 2 (two) times daily. 60 tablet 11    CABOMETYX 60 mg Tab TAKE ONE TABLET BY MOUTH ONCE DAILY AT THE SAME TIME ON AN EMPTY STOMACH AT LEAST 1 HOUR BEFORE OR 2 HOURS AFTER EATING. AVOID GRAPEFRUIT PRODUCTS 30 tablet 4    cloNIDine 0.3 mg/24 hr td ptwk (CATAPRES) 0.3 mg/24 hr Place 1 patch onto the skin every 7 days. 4 patch 11    epoetin david-epbx (RETACRIT INJ) Epoetin david - epbx (Retacrit)      hydrALAZINE (APRESOLINE) 100 MG tablet Take 1 tablet (100 mg total) by mouth every 12 (twelve) hours. 180 tablet 2    lidocaine-prilocaine (EMLA) cream APPLY ATLEAST 30 MINUTES BEFORE TREATMENT 3 TIMES A WEEK 30 g 11    metoprolol succinate (TOPROL-XL) 100 MG 24 hr tablet Take 1 tablet (100 mg total) by mouth once daily. 90 tablet 0    mv,Ca,min-folic acid-vit K1 (ONE-A-DAY WOMEN'S 50 PLUS) 400-20 mcg Tab Take 1 tablet by mouth.      ondansetron (ZOFRAN-ODT) 8 MG TbDL Take 1 tablet (8 mg total) by mouth every 12 (twelve) hours as needed. 30 tablet 1    oxyCODONE-acetaminophen (PERCOCET) 5-325 mg per tablet Take 1 tablet by mouth every 6 (six) hours as needed for Pain. 30 each 0    oxyCODONE-acetaminophen (PERCOCET) 5-325 mg per tablet Take 1 tablet by mouth every 4 (four) hours as needed. 18 tablet 0    promethazine (PHENERGAN) 25 MG tablet Take 1 tablet (25 mg total) by mouth every 6 (six) hours as needed for Nausea. 15 tablet 0    sacubitriL-valsartan (ENTRESTO) 49-51 mg per tablet Take 1 tablet by mouth 2 (two) times daily. 60 tablet 11    sevelamer carbonate (RENVELA) 800 mg Tab TAKE 2 TABLETS (1,600 MG TOTAL) BY MOUTH 3 (THREE) TIMES DAILY WITH MEALS. (Patient not taking: Reported on 10/26/2022) 540 tablet 0    sodium chloride 0.9% SolP 100 mL with iron sucrose 100 mg iron/5 mL Soln 100 mg 50 mg.      [DISCONTINUED] amLODIPine (NORVASC) 5 MG tablet TAKE 1 TABLET BY MOUTH EVERY DAY  90 tablet 3        Objective Findings:    Vital Signs:  Temp:  [97.5 °F (36.4 °C)-98.2 °F (36.8 °C)]   Pulse:  [77-87]   Resp:  [16-18]   BP: ()/(50-73)   SpO2:  [93 %-97 %]   Body mass index is 18.92 kg/m².      Physical Exam  Vitals and nursing note reviewed.   Constitutional:       Appearance: She is ill-appearing and toxic-appearing.   HENT:      Head: Normocephalic.      Nose: Nose normal.      Mouth/Throat:      Mouth: Mucous membranes are moist.   Eyes:      General: Scleral icterus present.      Pupils: Pupils are equal, round, and reactive to light.   Cardiovascular:      Heart sounds: Normal heart sounds.   Pulmonary:      Effort: Pulmonary effort is normal.      Breath sounds: Normal breath sounds.   Abdominal:      General: Bowel sounds are normal. There is no distension.      Palpations: Abdomen is soft.      Tenderness: There is no abdominal tenderness.   Musculoskeletal:         General: Normal range of motion.      Cervical back: Normal range of motion.   Skin:     Capillary Refill: Capillary refill takes less than 2 seconds.   Neurological:      General: No focal deficit present.      Mental Status: She is alert.   Psychiatric:         Attention and Perception: Attention normal.         Mood and Affect: Mood normal.         Behavior: Behavior normal.         Thought Content: Thought content normal.         Judgment: Judgment normal.             Labs:  Lab Results   Component Value Date    WBC 9.38 11/03/2022    HGB 8.3 (L) 11/03/2022    HCT 25.5 (L) 11/03/2022    PLT 67 (L) 11/03/2022    CHOL 101 (L) 10/22/2022    TRIG 97 10/22/2022    HDL 35 (L) 10/22/2022     (H) 11/03/2022     (H) 11/03/2022     11/03/2022    K 3.3 (L) 11/03/2022     11/03/2022    CREATININE 3.6 (H) 11/03/2022    BUN 30 (H) 11/03/2022    CO2 26 11/03/2022    TSH 5.286 (H) 10/29/2022    INR 1.1 09/24/2022    HGBA1C 4.8 10/22/2022             Imaging: 10/29 US abdomen-Gallstones.  Thick-walled  gallbladder.  Positive sonographic Sloan sign.  Abdominal ascites.  The possibility of acute cholecystitis should be considered.  1.3 x 0.8 x 1.3 cm left hepatic lesion with minimal peripheral blood flow.  Recommend correlation with MRI of the abdomen from 12/28/2020  Multiple right renal cyst or polycystic kidney.      Endoscopy: 3/2021 Colonoscopy- - One 3 mm polyp in the sigmoid colon, removed with  a jumbo cold forceps. Resected and retrieved.                        - Non-bleeding external and internal hemorrhoids.                        - The examination was otherwise normal on direct and                        retroflexion views.     I have independently reviewed and interpreted the imaging above    Assessment:  Patient is a .57 y.o. y/o .female with  has a past medical history of Anemia, Bronchitis (03/01/2017), Cancer (2016), CHF (congestive heart failure), NYHA class II, chronic, systolic, CMV (cytomegalovirus) antibody positive, Essential hypertension (9/23/2015), H/O herpes simplex type 2 infection, Herpes simplex type 1 antibody positive, History of kidney cancer, Hyperparathyroidism, unspecified, Hyperuricemia without signs of inflammatory arthritis and tophaceous disease, Kidney stones, LGSIL (low grade squamous intraepithelial dysplasia), Myocardiopathy (7/21/2017), Prediabetes, Proteinuria, Renal disorder, Thyroid nodule, and Urate nephropathy. Consulted to the GI service for transaminitis.               Recommendations:  1. Transaminitis. Elevated alk phos. Elevated bili. Cholelithiasis with cystitis. Liver lesion.Polycystic kidney disease. Metastatic renal cell carcinoma, s/p nephrectomy.EF 25%.    Bili, alk phos, lft elevation could be in relation to gallbladder presentation. HIDA scan limited d/t poor liver clearance. Alk phos almost doubled, bili increased and Lfts with slight uptick. Follow levels.     Liver lesion on current US, previous CT from 2021 showing liver lesions or cysts. HIDA  showing poor liver clearance. Not likely to tolerate any invasive diagnostic at this time.   Bili and lfts showing improvement. Abnormalities  likely due to hepatobiliary congestion related to HF.    Thank you so much for allowing us to participate in the care of [place] . Please contact us if you have any additional questions.    Diana Christian NP  Gastroenterology  [facility] [place] [facility] Surg         left

## 2022-11-03 NOTE — SUBJECTIVE & OBJECTIVE
Interval History: feeling better.    Review of Systems   HENT:  Negative for ear discharge and ear pain.    Eyes:  Negative for discharge and itching.   Endocrine: Negative for cold intolerance and heat intolerance.   Neurological:  Negative for seizures and syncope.   Objective:     Vital Signs (Most Recent):  Temp: 97.9 °F (36.6 °C) (11/03/22 1056)  Pulse: 86 (11/03/22 1056)  Resp: 16 (11/03/22 1056)  BP: (!) 118/56 (11/03/22 1056)  SpO2: 97 % (11/03/22 1056)   Vital Signs (24h Range):  Temp:  [97.5 °F (36.4 °C)-98.2 °F (36.8 °C)] 97.9 °F (36.6 °C)  Pulse:  [79-87] 86  Resp:  [16-18] 16  SpO2:  [93 %-97 %] 97 %  BP: (103-131)/(56-73) 118/56     Weight: 50 kg (110 lb 3.7 oz)  Body mass index is 18.92 kg/m².    Intake/Output Summary (Last 24 hours) at 11/3/2022 1458  Last data filed at 11/3/2022 1353  Gross per 24 hour   Intake 1096.57 ml   Output --   Net 1096.57 ml        Physical Exam  Constitutional:       Appearance: She is ill-appearing. She is not diaphoretic.   HENT:      Head: Normocephalic.      Nose: Nose normal.      Mouth/Throat:      Mouth: Mucous membranes are dry.   Eyes:      Extraocular Movements: Extraocular movements intact.      Pupils: Pupils are equal, round, and reactive to light.   Cardiovascular:      Rate and Rhythm: Normal rate and regular rhythm.      Heart sounds: No murmur heard.  Pulmonary:      Effort: No respiratory distress.      Breath sounds: No wheezing.   Abdominal:      General: Abdomen is flat. There is no distension.      Palpations: Abdomen is soft.      Tenderness: There is no abdominal tenderness.   Musculoskeletal:         General: Swelling and tenderness present.      Cervical back: Normal range of motion and neck supple.   Skin:     Coloration: Skin is not jaundiced.      Findings: No bruising.   Neurological:      General: No focal deficit present.      Cranial Nerves: No cranial nerve deficit.      Motor: No weakness.   Psychiatric:         Speech: Speech is delayed.          Behavior: Behavior is slowed.       Significant Labs: All pertinent labs within the past 24 hours have been reviewed.  BMP:   Recent Labs   Lab 11/03/22  0738   GLU 69*      K 3.3*      CO2 26   BUN 30*   CREATININE 3.6*   CALCIUM 8.5*       CBC:   Recent Labs   Lab 11/03/22  0738   WBC 9.38   HGB 8.3*   HCT 25.5*   PLT 67*         Significant Imaging: I have reviewed all pertinent imaging results/findings within the past 24 hours.

## 2022-11-03 NOTE — ASSESSMENT & PLAN NOTE
Suspect to liver congestion.  GI following  Improving LFT's.  Noted worsening thrombocytopenia.  Repeat labs in Am.  Check INR.

## 2022-11-03 NOTE — PLAN OF CARE
Problem: Adult Inpatient Plan of Care  Goal: Plan of Care Review  Outcome: Ongoing, Progressing  Goal: Patient-Specific Goal (Individualized)  Outcome: Ongoing, Progressing  Goal: Absence of Hospital-Acquired Illness or Injury  Outcome: Ongoing, Progressing  Goal: Optimal Comfort and Wellbeing  Outcome: Ongoing, Progressing  Goal: Readiness for Transition of Care  Outcome: Ongoing, Progressing     Problem: Infection  Goal: Absence of Infection Signs and Symptoms  Outcome: Ongoing, Progressing     Problem: Impaired Wound Healing  Goal: Optimal Wound Healing  Outcome: Ongoing, Progressing     Problem: Adjustment to Illness (Sepsis/Septic Shock)  Goal: Optimal Coping  Outcome: Ongoing, Progressing     Problem: Bleeding (Sepsis/Septic Shock)  Goal: Absence of Bleeding  Outcome: Ongoing, Progressing     Problem: Glycemic Control Impaired (Sepsis/Septic Shock)  Goal: Blood Glucose Level Within Desired Range  Outcome: Ongoing, Progressing     Problem: Infection Progression (Sepsis/Septic Shock)  Goal: Absence of Infection Signs and Symptoms  Outcome: Ongoing, Progressing     Problem: Skin Injury Risk Increased  Goal: Skin Health and Integrity  Outcome: Ongoing, Progressing     Problem: Coping Ineffective  Goal: Effective Coping  Outcome: Ongoing, Progressing     Problem: Infection (Hemodialysis)  Goal: Absence of Infection Signs and Symptoms  Outcome: Ongoing, Progressing

## 2022-11-03 NOTE — ASSESSMENT & PLAN NOTE
This patient does have evidence of infective focus  My overall impression is sepsis. Vital signs were reviewed and noted in progress note.  Antibiotics given-   Antibiotics (From admission, onward)    Start     Stop Route Frequency Ordered    10/29/22 2100  rifAXIMin tablet 550 mg         -- Oral 2 times daily 10/29/22 1942    10/29/22 1245  mupirocin 2 % ointment         11/03 0859 Nasl 2 times daily 10/29/22 1140        Cultures were taken-   Microbiology Results (last 7 days)     Procedure Component Value Units Date/Time    Blood Culture #1 **CANNOT BE ORDERED STAT** [[id number]] Collected: 10/29/22 0941    Order Status: Completed Specimen: Blood from Peripheral, Hand, Right Updated: 11/02/22 1503     Blood Culture, Routine No Growth after 4 days.    Blood Culture #2 **CANNOT BE ORDERED STAT** [[id number]] Collected: 10/29/22 0915    Order Status: Completed Specimen: Blood from Peripheral, Hand, Right Updated: 11/02/22 1503     Blood Culture, Routine No Growth after 4 days.        Latest lactate reviewed, they are-  No results for input(s): LACTATE in the last 72 hours.    Organ dysfunction indicated by Acute liver injury  Source- initially thought to be acute cholecystitis.  HIDA unable to eval gallbladder due to poor hepatic clearance. No indication for cholecystostomy drain as pain has improved per Surgery.  Unsure about actual source.    No obvious source of infection.  Stop ABx's.

## 2022-11-03 NOTE — PT/OT/SLP EVAL
Occupational Therapy   Evaluation    Name: Eva Bloom  MRN: [id number]  Admitting Diagnosis:  Sepsis  Recent Surgery: * No surgery found *      Recommendations:     Discharge Recommendations: rehabilitation facility (If D/C to home, the patient will benefit from HH OT)  Discharge Equipment Recommendations:  walker, rolling, bedside commode, wheelchair  Barriers to discharge:   (generalized weakness, required assist for self care and mobility)    Assessment:     Eva Bloom is a 57 y.o. female with a medical diagnosis of Sepsis. Performance deficits affecting function: weakness, impaired endurance, impaired self care skills, impaired functional mobility, gait instability, impaired balance, decreased coordination, decreased upper extremity function, decreased lower extremity function.    The patient was cooperative with OT. The patient presents with generalized weakness with mod assist need to step to the chair with HHA. The patient was (I) with self care and amb at home, without AD prior to hospital admit. The patient may benefit from IPR to maximize ability to return to PLOF.      Rehab Prognosis: Good; patient would benefit from acute skilled OT services to address these deficits and reach maximum level of function.       Plan:     Patient to be seen 5 x/week to address the above listed problems via self-care/home management, therapeutic activities, therapeutic exercises  Plan of Care Expires: 11/17/22  Plan of Care Reviewed with: patient    Subjective     Chief Complaint: legs feel weak  Patient/Family Comments/goals: agreeable to sit in the chair    Occupational Profile:  Living Environment: lives with her spouse in a house  Previous level of function: (I) with self care and amb. The patient bathed while seated on a shower chair.  Roles and Routines: The patient states she is alone at home. She goes outside sometimes. The patient was unable to state hobbies.  Equipment Used at Home:  none  Assistance upon  Discharge: spouse (works)    Pain/Comfort:  Pain Rating 1: 0/10    Patients cultural, spiritual, Jain conflicts given the current situation: no    Objective:     Communicated with: Sammi may prior to session.  Patient found HOB elevated with peripheral IV, telemetry upon OT entry to room.    General Precautions: Standard, fall   Orthopedic Precautions:N/A   Braces: N/A  Respiratory Status: Room air    Occupational Performance:    Bed Mobility:    Patient completed Scooting/Bridging with stand by assistance, contact guard assistance, and increased time  Patient completed Supine to Sit with stand by assistance, with side rail, and verbal cues with HOB elevated    Functional Mobility/Transfers:  Patient completed Sit <> Stand Transfer with moderate assistance  with  rolling walker   Functional Mobility: The patient stood x2 trial with mod assist with RW and then HHA. The patient stepped to the chair with NHHA a,d min assist.    Activities of Daily Living:  Upper Body Dressing: moderate assistance    Lower Body Dressing: dependence      Cognitive/Visual Perceptual:  Cognitive/Psychosocial Skills:     -       Oriented to: Person, Place, and Situation   -       Follows Commands/attention:Follows two-step commands  -       Communication: clear/fluent and quiet voice  -       Memory: appears intact but minimal conversation  -       Safety awareness/insight to disability: impaired   -       Mood/Affect/Coping skills/emotional control: Appropriate to situation  Visual/Perceptual:      -Intact      Physical Exam:  Balance: -       fair+/fair  Postural examination/scapula alignment:    -       Rounded shoulders  -       Forward head  Skin integrity: Visible skin intact  Edema:  None noted  Sensation:    -       Intact  Upper Extremity Range of Motion:     -       Right Upper Extremity: WFL  -       Left Upper Extremity: WFL  Upper Extremity Strength:  generalized weakness  -       Right Upper Extremity: WFL   -        Left Upper Extremity: WFL    [facility] 6 Click ADL:  AMPA Total Score: 15    Treatment & Education:  Participated in OT eval  The patient participated in self care and functional mobility as noted above  Educated re: OT role and POC  Provided the patient with a soft air cushion for pressure relief in the chair  Educated the patient re: need to request nursing assist  to return to bed with use of a chair alarm    Patient left up in chair with all lines intact, call button in reach, and nurse notified    GOALS:   Multidisciplinary Problems       Occupational Therapy Goals          Problem: Occupational Therapy    Goal Priority Disciplines Outcome Interventions   Occupational Therapy Goal     OT, PT/OT Ongoing, Progressing    Description: Goals to be met by: 11/17/22     Patient will increase functional independence with ADLs by performing:    UE Dressing with Modified [place].  LE Dressing with Stand-by Assistance.  Grooming while seated with Modified [place].  Toileting from bedside commode with Stand-by Assistance and Assistive Devices as needed for hygiene and clothing management.   Rolling to Bilateral with Modified [place].   Supine to sit with Modified [place].  Step transfer with Contact Guard Assistance  Toilet transfer to bedside commode with Contact Guard Assistance.  Upper extremity exercise program x15 reps per handout, with independence.                         History:     Past Medical History:   Diagnosis Date    Anemia     Bronchitis 03/01/2017    Cancer 2016    kidney cancer    CHF (congestive heart failure), NYHA class II, chronic, systolic     CMV (cytomegalovirus) antibody positive     Essential hypertension 9/23/2015    H/O herpes simplex type 2 infection     Herpes simplex type 1 antibody positive     History of kidney cancer     s/p left nephrectomy 1/2016    Hyperparathyroidism, unspecified     Hyperuricemia without signs of inflammatory arthritis and tophaceous disease      Kidney stones     LGSIL (low grade squamous intraepithelial dysplasia)     Myocardiopathy 7/21/2017    Prediabetes     Proteinuria     Renal disorder     Thyroid nodule     Urate nephropathy          Past Surgical History:   Procedure Laterality Date    BREAST CYST EXCISION      COLONOSCOPY N/A 11/12/2015    COLONOSCOPY N/A 3/12/2021    Procedure: COLONOSCOPY;  Surgeon: Brendon Lanier MD;  Location: [facility];  Service: Endoscopy;  Laterality: N/A;  covid test 3/9, labs prior, prep instr mailed -ml    INSERTION OF BIVENTRICULAR IMPLANTABLE CARDIOVERTER-DEFIBRILLATOR (ICD)  04/2021    NEPHRECTOMY-LAPAROSCOPIC Left 01/12/2016    PERITONEAL CATHETER INSERTION      Permacath insertion  01/12/2017    SALPINGOOPHORECTOMY Right 2016    KJB---DAVINCI    TONSILLECTOMY      TUBAL LIGATION         Time Tracking:     OT Date of Treatment: 11/03/22  OT Start Time: 1026  OT Stop Time: 1056  OT Total Time (min): 30 min    Billable Minutes:Evaluation 15  Therapeutic Activity 15  Total Time 30    11/3/2022

## 2022-11-03 NOTE — PROGRESS NOTES
Awake alert oriented NAD    Denies CNS ENT CP GI  RHEUM OR DERM SX  Past Medical History:   Diagnosis Date    Anemia     Bronchitis 03/01/2017    Cancer 2016    kidney cancer    CHF (congestive heart failure), NYHA class II, chronic, systolic     CMV (cytomegalovirus) antibody positive     Essential hypertension 9/23/2015    H/O herpes simplex type 2 infection     Herpes simplex type 1 antibody positive     History of kidney cancer     s/p left nephrectomy 1/2016    Hyperparathyroidism, unspecified     Hyperuricemia without signs of inflammatory arthritis and tophaceous disease     Kidney stones     LGSIL (low grade squamous intraepithelial dysplasia)     Myocardiopathy 7/21/2017    Prediabetes     Proteinuria     Renal disorder     Thyroid nodule     Urate nephropathy      Past Surgical History:   Procedure Laterality Date    BREAST CYST EXCISION      COLONOSCOPY N/A 11/12/2015    COLONOSCOPY N/A 3/12/2021    Procedure: COLONOSCOPY;  Surgeon: Brendon Lanier MD;  Location: [facility];  Service: Endoscopy;  Laterality: N/A;  covid test 3/9, labs prior, prep instr mailed -ml    INSERTION OF BIVENTRICULAR IMPLANTABLE CARDIOVERTER-DEFIBRILLATOR (ICD)  04/2021    NEPHRECTOMY-LAPAROSCOPIC Left 01/12/2016    PERITONEAL CATHETER INSERTION      Permacath insertion  01/12/2017    SALPINGOOPHORECTOMY Right 2016    KJB---DAVINCI    TONSILLECTOMY      TUBAL LIGATION       Review of patient's allergies indicates:   Allergen Reactions    Coreg [carvedilol] Other (See Comments)     Nausea/vomiting    Allopurinol      Other reaction(s): abnormal transaminases       Current Facility-Administered Medications   Medication    0.9%  NaCl infusion    0.9%  NaCl infusion    acetaminophen tablet 650 mg    amLODIPine tablet 10 mg    apixaban tablet 2.5 mg    dextrose 10% bolus 250 mL    epoetin david-epbx injection 2,520 Units    hydrALAZINE injection 20 mg    hydrALAZINE tablet 100 mg    labetaloL injection 10 mg    metoprolol  succinate (TOPROL-XL) 24 hr tablet 100 mg    piperacillin-tazobactam 4.5 g in dextrose 5 % 100 mL IVPB (ready to mix system)    rifAXIMin tablet 550 mg    sacubitriL-valsartan 24-26 mg per tablet 2 tablet    sodium chloride 0.9% bolus 250 mL       LABS    Recent Results (from the past 24 hour(s))   POCT glucose    Collection Time: 11/02/22  4:55 PM   Result Value Ref Range    POCT Glucose 94 70 - 110 mg/dL   CBC auto differential    Collection Time: 11/03/22  7:38 AM   Result Value Ref Range    WBC 9.38 3.90 - 12.70 K/uL    RBC 2.49 (L) 4.00 - 5.40 M/uL    Hemoglobin 8.3 (L) 12.0 - 16.0 g/dL    Hematocrit 25.5 (L) 37.0 - 48.5 %     (H) 82 - 98 fL    MCH 33.3 (H) 27.0 - 31.0 pg    MCHC 32.5 32.0 - 36.0 g/dL    RDW 23.7 (H) 11.5 - 14.5 %    Platelets 67 (L) 150 - 450 K/uL    MPV 10.7 9.2 - 12.9 fL    Immature Granulocytes 0.5 0.0 - 0.5 %    Gran # (ANC) 8.2 (H) 1.8 - 7.7 K/uL    Immature Grans (Abs) 0.05 (H) 0.00 - 0.04 K/uL    Lymph # 0.3 (L) 1.0 - 4.8 K/uL    Mono # 0.6 0.3 - 1.0 K/uL    Eos # 0.2 0.0 - 0.5 K/uL    Baso # 0.01 0.00 - 0.20 K/uL    nRBC 2 (A) 0 /100 WBC    Gran % 87.5 (H) 38.0 - 73.0 %    Lymph % 3.1 (L) 18.0 - 48.0 %    Mono % 6.3 4.0 - 15.0 %    Eosinophil % 2.5 0.0 - 8.0 %    Basophil % 0.1 0.0 - 1.9 %    Differential Method Automated    Comprehensive metabolic panel    Collection Time: 11/03/22  7:38 AM   Result Value Ref Range    Sodium 141 136 - 145 mmol/L    Potassium 3.3 (L) 3.5 - 5.1 mmol/L    Chloride 104 95 - 110 mmol/L    CO2 26 23 - 29 mmol/L    Glucose 69 (L) 70 - 110 mg/dL    BUN 30 (H) 6 - 20 mg/dL    Creatinine 3.6 (H) 0.5 - 1.4 mg/dL    Calcium 8.5 (L) 8.7 - 10.5 mg/dL    Total Protein 5.1 (L) 6.0 - 8.4 g/dL    Albumin 1.8 (L) 3.5 - 5.2 g/dL    Total Bilirubin 3.5 (H) 0.1 - 1.0 mg/dL    Alkaline Phosphatase 449 (H) 55 - 135 U/L     (H) 10 - 40 U/L     (H) 10 - 44 U/L    Anion Gap 11 8 - 16 mmol/L    eGFR 14 (A) >60 mL/min/1.73 m^2   ]    I/O last 3 completed  shifts:  In: 480 [P.O.:480]  Out: 1100 [Other:1100]    Vitals:    11/02/22 2041 11/02/22 2327 11/03/22 0315 11/03/22 0706   BP:  131/73 121/60 119/60   Pulse: 83 87 83 87   Resp:  16 16 18   Temp:  98 °F (36.7 °C) 98 °F (36.7 °C) 98.2 °F (36.8 °C)   TempSrc:  Oral Oral Oral   SpO2:  (!) 94% 95% (!) 93%   Weight:       Height:           No Jvd, Thyromegaly or Lymphadenopathy  Lungs: Fairly clear anteriorly and laterally  Cor: RRR no G or rubs  Abd: Soft benign good bowel sounds non tender  Ext: No E C C    A)    ESRD on HD MWF   Sepsis, ? GB infection pain better no surgical intervention planned   CHF/CMP  Pulm htn   Anemia  2nd hyperpth  PAF  Lactic acidosis  HX of RCC sp nephrectomy      P)     HD TIW MWF   Epo prn  Nutritional support  Binders when able   Antibiotx as needed adjusted to renal fx         Reconsider entresto      Px guarded

## 2022-11-03 NOTE — PROGRESS NOTES
[facility] Medicine  Progress Note    Patient Name: Eva Bloom  MRN: [id number]  Patient Class: IP- Inpatient   Admission Date: 10/29/2022  Length of Stay: 5 days  Attending Physician: Rolf Recinos MD  Primary Care Provider: Sowmya Mccain MD        Subjective:     Principal Problem:Sepsis        HPI:  57 year old female, with pertinent medical history of anemia, kidney cancer, CHF with EF of 35%,, HTN, Pafib on OAC,prediabetes, and myocardiopathy, presents to the ED to evaluate her worsening bilateral leg pain and weakness for 5 days. Patient's  states she was seen for the same symptoms 5 days ago as well as sore throat, chest pain, and nausea. She had labwork done and her results were all negative. Sore throat, chest pain, and nausea have resolved. Patient woke up this morning very weak and fatigued. She was late for dialysis and when she arrived, she was advised to come to the ED. Patient received her dialysis 2 days ago as normal. Patient was able to ambulate alone yesterday with pain, but needs support today.per ER record,  reports she has decreased appetite and is no longer producing urine. No pain medications taken today.  notes that a week ago, patient attended an event and possibly had sick contacts. Patient denies congestion, cough, trauma to legs,or other associated symptoms,patient is hypothermic in ER 93%,has elevated lactic acid,more than 12,has leucocytosis 14,patient is septic,can not gave IVF duo to  ESRD,has been  started on broad spectrum IV Abx.he has severe acidosis C O 2 of 8 and elevated Troponin 2.8,she denies chest apian dn abdominal pain,only complain is leg pain.she has also Hyperkalemia 6.0.      Overview/Hospital Course:  57 year old female presents to the ED to evaluate her worsening bilateral leg pain and weakness for 5 days.  Patient was hypothermic in ER with lactic acidosis.  Started on Bicarb drip and underwent HD.  Started on  broad spectrum ABx's.  Elevated Troponin and Cardiology consulted.  Echo shows EF of 25%.  Denies any chest pain and no plans for intervention at this time.  US abdomen shows possible acute cholecystitis.  Surgery consulted.  HIDA unable to eval gallbladder due to poor hepatic clearance. No indication for cholecystostomy drain as pain has improved.  PT/OT consulted.      Interval History: feeling better.    Review of Systems   HENT:  Negative for ear discharge and ear pain.    Eyes:  Negative for discharge and itching.   Endocrine: Negative for cold intolerance and heat intolerance.   Neurological:  Negative for seizures and syncope.   Objective:     Vital Signs (Most Recent):  Temp: 97.9 °F (36.6 °C) (11/03/22 1056)  Pulse: 86 (11/03/22 1056)  Resp: 16 (11/03/22 1056)  BP: (!) 118/56 (11/03/22 1056)  SpO2: 97 % (11/03/22 1056)   Vital Signs (24h Range):  Temp:  [97.5 °F (36.4 °C)-98.2 °F (36.8 °C)] 97.9 °F (36.6 °C)  Pulse:  [79-87] 86  Resp:  [16-18] 16  SpO2:  [93 %-97 %] 97 %  BP: (103-131)/(56-73) 118/56     Weight: 50 kg (110 lb 3.7 oz)  Body mass index is 18.92 kg/m².    Intake/Output Summary (Last 24 hours) at 11/3/2022 1458  Last data filed at 11/3/2022 1353  Gross per 24 hour   Intake 1096.57 ml   Output --   Net 1096.57 ml        Physical Exam  Constitutional:       Appearance: She is ill-appearing. She is not diaphoretic.   HENT:      Head: Normocephalic.      Nose: Nose normal.      Mouth/Throat:      Mouth: Mucous membranes are dry.   Eyes:      Extraocular Movements: Extraocular movements intact.      Pupils: Pupils are equal, round, and reactive to light.   Cardiovascular:      Rate and Rhythm: Normal rate and regular rhythm.      Heart sounds: No murmur heard.  Pulmonary:      Effort: No respiratory distress.      Breath sounds: No wheezing.   Abdominal:      General: Abdomen is flat. There is no distension.      Palpations: Abdomen is soft.      Tenderness: There is no abdominal tenderness.    Musculoskeletal:         General: Swelling and tenderness present.      Cervical back: Normal range of motion and neck supple.   Skin:     Coloration: Skin is not jaundiced.      Findings: No bruising.   Neurological:      General: No focal deficit present.      Cranial Nerves: No cranial nerve deficit.      Motor: No weakness.   Psychiatric:         Speech: Speech is delayed.         Behavior: Behavior is slowed.       Significant Labs: All pertinent labs within the past 24 hours have been reviewed.  BMP:   Recent Labs   Lab 11/03/22  0738   GLU 69*      K 3.3*      CO2 26   BUN 30*   CREATININE 3.6*   CALCIUM 8.5*       CBC:   Recent Labs   Lab 11/03/22  0738   WBC 9.38   HGB 8.3*   HCT 25.5*   PLT 67*         Significant Imaging: I have reviewed all pertinent imaging results/findings within the past 24 hours.      Assessment/Plan:      * Sepsis  This patient does have evidence of infective focus  My overall impression is sepsis. Vital signs were reviewed and noted in progress note.  Antibiotics given-   Antibiotics (From admission, onward)    Start     Stop Route Frequency Ordered    10/29/22 2100  rifAXIMin tablet 550 mg         -- Oral 2 times daily 10/29/22 1942    10/29/22 1245  mupirocin 2 % ointment         11/03 0859 Nasl 2 times daily 10/29/22 1140        Cultures were taken-   Microbiology Results (last 7 days)     Procedure Component Value Units Date/Time    Blood Culture #1 **CANNOT BE ORDERED STAT** [[id number]] Collected: 10/29/22 0941    Order Status: Completed Specimen: Blood from Peripheral, Hand, Right Updated: 11/02/22 1503     Blood Culture, Routine No Growth after 4 days.    Blood Culture #2 **CANNOT BE ORDERED STAT** [[id number]] Collected: 10/29/22 0915    Order Status: Completed Specimen: Blood from Peripheral, Hand, Right Updated: 11/02/22 1503     Blood Culture, Routine No Growth after 4 days.        Latest lactate reviewed, they are-  No results for input(s): LACTATE in the  last 72 hours.    Organ dysfunction indicated by Acute liver injury  Source- initially thought to be acute cholecystitis.  HIDA unable to eval gallbladder due to poor hepatic clearance. No indication for cholecystostomy drain as pain has improved per Surgery.  Unsure about actual source.    No obvious source of infection.  Stop ABx's.          Transaminitis  Suspect to liver congestion.  GI following  Improving LFT's.  Noted worsening thrombocytopenia.  Repeat labs in Am.  Check INR.      Elevated troponin  Denies chest pain,will monitor,checked Echo,has EF of 25%,cardiology is on case,.she denies chest pain.no pan for intervention at this time.      PAF (paroxysmal atrial fibrillation)  Patient with Persistent (7 days or more) atrial fibrillation which is controlled currently with Beta Blocker. Patient is currently in sinus rhythm.[id number] Score: 1. HASBLED Score: . Anticoagulation indicated. Anticoagulation done with eliquis.  Resume Eliquis        ICD (implantable cardioverter-defibrillator) in place - SubQ  Will monitor.      Chronic kidney disease-mineral and bone disorder  On HD.      Anemia in ESRD (end-stage renal disease)  Will monitor.      Chronic combined systolic and diastolic congestive heart failure        Nonrheumatic mitral valve regurgitation  Per record,      ESRD (end stage renal disease) on dialysis  Nephrology consulted for HD      Pulmonary hypertension  per record.      Cardiomyopathy, nonischemic  Echo shows EF of 25%      S/p nephrectomy left  Per record.      History of kidney cancer  per record.      Essential hypertension  Resumed home medications..        VTE Risk Mitigation (From admission, onward)         Ordered     apixaban tablet 2.5 mg  2 times daily         11/02/22 1520     Place sequential compression device  Until discontinued         11/01/22 0945                Discharge Planning   MAVIS: 11/5/2022     Code Status: Full Code   Is the patient medically ready for discharge?:      Reason for patient still in hospital (select all that apply): Patient trending condition  Discharge Plan A: Home Health (Not sure if she wants home health at this time.  Normally able to perform ADLs per spouse.)                  Rolf Recinos MD  Department of Hospital Medicine   [facility] Surg

## 2022-11-03 NOTE — PLAN OF CARE
Problem: Adult Inpatient Plan of Care  Goal: Plan of Care Review  Outcome: Ongoing, Progressing  Goal: Optimal Comfort and Wellbeing  Outcome: Ongoing, Progressing  Goal: Readiness for Transition of Care  Outcome: Ongoing, Progressing     Problem: Infection  Goal: Absence of Infection Signs and Symptoms  Outcome: Ongoing, Progressing     Problem: Adjustment to Illness (Sepsis/Septic Shock)  Goal: Optimal Coping  Outcome: Ongoing, Progressing     Problem: Device-Related Complication Risk (Hemodialysis)  Goal: Safe, Effective Therapy Delivery  Outcome: Ongoing, Progressing

## 2022-11-03 NOTE — PROGRESS NOTES
Sitting up in chair at bedside on air cushion. Eating lunch. Appears stronger than when evaluated 11/1.   Currently not wearing Mepilex sacral protective dressing. Discussed with nursing. To reapply dressing when patient returns to bed for PIP. Still prefers using pillow in bed for comfort/positioning.   Nursing to continue PIP.

## 2022-11-03 NOTE — ASSESSMENT & PLAN NOTE
Patient with Persistent (7 days or more) atrial fibrillation which is controlled currently with Beta Blocker. Patient is currently in sinus rhythm.[id number] Score: 1. HASBLED Score: . Anticoagulation indicated. Anticoagulation done with eliquis.  Resume Eliquis

## 2022-11-04 PROBLEM — R53.81 DEBILITY: Status: ACTIVE | Noted: 2022-11-04

## 2022-11-04 LAB
ALBUMIN SERPL BCP-MCNC: 1.8 G/DL (ref 3.5–5.2)
ALP SERPL-CCNC: 429 U/L (ref 55–135)
ALT SERPL W/O P-5'-P-CCNC: 137 U/L (ref 10–44)
ANION GAP SERPL CALC-SCNC: 12 MMOL/L (ref 8–16)
AST SERPL-CCNC: 209 U/L (ref 10–40)
BASOPHILS # BLD AUTO: 0.01 K/UL (ref 0–0.2)
BASOPHILS NFR BLD: 0.1 % (ref 0–1.9)
BILIRUB SERPL-MCNC: 2.9 MG/DL (ref 0.1–1)
BUN SERPL-MCNC: 39 MG/DL (ref 6–20)
CALCIUM SERPL-MCNC: 8.6 MG/DL (ref 8.7–10.5)
CHLORIDE SERPL-SCNC: 104 MMOL/L (ref 95–110)
CO2 SERPL-SCNC: 25 MMOL/L (ref 23–29)
CREAT SERPL-MCNC: 4.6 MG/DL (ref 0.5–1.4)
DIFFERENTIAL METHOD: ABNORMAL
EOSINOPHIL # BLD AUTO: 0.2 K/UL (ref 0–0.5)
EOSINOPHIL NFR BLD: 2.3 % (ref 0–8)
ERYTHROCYTE [DISTWIDTH] IN BLOOD BY AUTOMATED COUNT: 23.5 % (ref 11.5–14.5)
EST. GFR  (NO RACE VARIABLE): 10 ML/MIN/1.73 M^2
GLUCOSE SERPL-MCNC: 76 MG/DL (ref 70–110)
HCT VFR BLD AUTO: 24 % (ref 37–48.5)
HGB BLD-MCNC: 8 G/DL (ref 12–16)
IMM GRANULOCYTES # BLD AUTO: 0.08 K/UL (ref 0–0.04)
IMM GRANULOCYTES NFR BLD AUTO: 0.9 % (ref 0–0.5)
INR PPP: 1.3 (ref 0.8–1.2)
LYMPHOCYTES # BLD AUTO: 0.5 K/UL (ref 1–4.8)
LYMPHOCYTES NFR BLD: 5.9 % (ref 18–48)
MCH RBC QN AUTO: 33.3 PG (ref 27–31)
MCHC RBC AUTO-ENTMCNC: 33.3 G/DL (ref 32–36)
MCV RBC AUTO: 100 FL (ref 82–98)
MONOCYTES # BLD AUTO: 0.9 K/UL (ref 0.3–1)
MONOCYTES NFR BLD: 9.5 % (ref 4–15)
NEUTROPHILS # BLD AUTO: 7.5 K/UL (ref 1.8–7.7)
NEUTROPHILS NFR BLD: 81.3 % (ref 38–73)
NRBC BLD-RTO: 1 /100 WBC
PLATELET # BLD AUTO: 69 K/UL (ref 150–450)
PMV BLD AUTO: 11.4 FL (ref 9.2–12.9)
POCT GLUCOSE: 121 MG/DL (ref 70–110)
POCT GLUCOSE: 192 MG/DL (ref 70–110)
POTASSIUM SERPL-SCNC: 3.3 MMOL/L (ref 3.5–5.1)
PROT SERPL-MCNC: 5.1 G/DL (ref 6–8.4)
PROTHROMBIN TIME: 13.4 SEC (ref 9–12.5)
RBC # BLD AUTO: 2.4 M/UL (ref 4–5.4)
SODIUM SERPL-SCNC: 141 MMOL/L (ref 136–145)
WBC # BLD AUTO: 9.16 K/UL (ref 3.9–12.7)

## 2022-11-04 PROCEDURE — 25000003 PHARM REV CODE 250: Performed by: HOSPITALIST

## 2022-11-04 PROCEDURE — 85610 PROTHROMBIN TIME: CPT | Performed by: HOSPITALIST

## 2022-11-04 PROCEDURE — 80053 COMPREHEN METABOLIC PANEL: CPT | Performed by: HOSPITALIST

## 2022-11-04 PROCEDURE — 11000001 HC ACUTE MED/SURG PRIVATE ROOM

## 2022-11-04 PROCEDURE — 27000207 HC ISOLATION

## 2022-11-04 PROCEDURE — 63600175 PHARM REV CODE 636 W HCPCS: Mod: JG | Performed by: HOSPITALIST

## 2022-11-04 PROCEDURE — 80100016 HC MAINTENANCE HEMODIALYSIS

## 2022-11-04 PROCEDURE — 85025 COMPLETE CBC W/AUTO DIFF WBC: CPT | Performed by: HOSPITALIST

## 2022-11-04 RX ADMIN — AMLODIPINE BESYLATE 10 MG: 5 TABLET ORAL at 03:11

## 2022-11-04 RX ADMIN — RIFAXIMIN 550 MG: 550 TABLET ORAL at 09:11

## 2022-11-04 RX ADMIN — APIXABAN 2.5 MG: 2.5 TABLET, FILM COATED ORAL at 08:11

## 2022-11-04 RX ADMIN — EPOETIN ALFA-EPBX 2520 UNITS: 4000 INJECTION, SOLUTION INTRAVENOUS; SUBCUTANEOUS at 09:11

## 2022-11-04 RX ADMIN — SACUBITRIL AND VALSARTAN 2 TABLET: 24; 26 TABLET, FILM COATED ORAL at 08:11

## 2022-11-04 RX ADMIN — METOPROLOL SUCCINATE 100 MG: 50 TABLET, EXTENDED RELEASE ORAL at 03:11

## 2022-11-04 RX ADMIN — APIXABAN 2.5 MG: 2.5 TABLET, FILM COATED ORAL at 09:11

## 2022-11-04 RX ADMIN — HYDRALAZINE HYDROCHLORIDE 100 MG: 25 TABLET, FILM COATED ORAL at 05:11

## 2022-11-04 RX ADMIN — HYDRALAZINE HYDROCHLORIDE 100 MG: 25 TABLET, FILM COATED ORAL at 09:11

## 2022-11-04 RX ADMIN — RIFAXIMIN 550 MG: 550 TABLET ORAL at 08:11

## 2022-11-04 NOTE — PLAN OF CARE
[facility] - Med Surg  Discharge Reassessment    Primary Care Provider: Sowmya Mccain MD    Expected Discharge Date: 11/5/2022    CM spoke to pt and pt's , Otis about dc needs. Pt's  stated pt has dialysis on TThS with [facility] in [place]. CM inquired if they were interested in going to inpatient rehab. Pt and pt's  stated they wanted HH and they didn't have a preference. CM sent referrals to [facility] and [facility].CM will follow up.    Reassessment (most recent)       Discharge Reassessment - 11/04/22 1606          Discharge Reassessment    Assessment Type Discharge Planning Reassessment     Did the patient's condition or plan change since previous assessment? Yes     Discharge Plan discussed with: Spouse/sig other;Patient     Name(s) and Number(s) Otis Bloom (Spouse)   [phone]     Discharge Plan A Home Health     Discharge Plan B Home with family     DME Needed Upon Discharge  other (see comments)   tbd    Discharge Barriers Identified None (P)      Why the patient remains in the hospital Requires continued medical care (P)         Post-Acute Status    Post-Acute Authorization Home Health (P)      Home Health Status Referrals Sent (P)      Coverage [employer] (P)      Patient choice form signed by patient/caregiver List from System Post-Acute Care (P)      Discharge Delays Post-Acute Set-up (P)

## 2022-11-04 NOTE — ASSESSMENT & PLAN NOTE
This patient does have evidence of infective focus  My overall impression is sepsis. Vital signs were reviewed and noted in progress note.  Antibiotics given-   Antibiotics (From admission, onward)    Start     Stop Route Frequency Ordered    10/29/22 2100  rifAXIMin tablet 550 mg         -- Oral 2 times daily 10/29/22 1942    10/29/22 1245  mupirocin 2 % ointment         11/03 0859 Nasl 2 times daily 10/29/22 1140        Cultures were taken-   Microbiology Results (last 7 days)     Procedure Component Value Units Date/Time    Blood Culture #1 **CANNOT BE ORDERED STAT** [[id number]] Collected: 10/29/22 0941    Order Status: Completed Specimen: Blood from Peripheral, Hand, Right Updated: 11/02/22 1503     Blood Culture, Routine No Growth after 4 days.    Blood Culture #2 **CANNOT BE ORDERED STAT** [[id number]] Collected: 10/29/22 0915    Order Status: Completed Specimen: Blood from Peripheral, Hand, Right Updated: 11/02/22 1503     Blood Culture, Routine No Growth after 4 days.        Latest lactate reviewed, they are-  No results for input(s): LACTATE in the last 72 hours.    Organ dysfunction indicated by Acute liver injury  Source- initially thought to be acute cholecystitis.  HIDA unable to eval gallbladder due to poor hepatic clearance. No indication for cholecystostomy drain as pain has improved per Surgery.  Unsure about actual source.    No obvious source of infection.  Stopped ABx's.

## 2022-11-04 NOTE — ASSESSMENT & PLAN NOTE
Suspect to liver congestion.  GI following  Improving LFT's.  Noted worsening thrombocytopenia.  Continue to monitor.

## 2022-11-04 NOTE — PLAN OF CARE
Problem: Adult Inpatient Plan of Care  Goal: Plan of Care Review  Outcome: Ongoing, Progressing  Goal: Patient-Specific Goal (Individualized)  Outcome: Ongoing, Progressing  Goal: Absence of Hospital-Acquired Illness or Injury  Outcome: Ongoing, Progressing  Goal: Optimal Comfort and Wellbeing  Outcome: Ongoing, Progressing  Goal: Readiness for Transition of Care  Outcome: Ongoing, Progressing     Problem: Infection  Goal: Absence of Infection Signs and Symptoms  Outcome: Ongoing, Progressing     Problem: Impaired Wound Healing  Goal: Optimal Wound Healing  Outcome: Ongoing, Progressing     Problem: Adjustment to Illness (Sepsis/Septic Shock)  Goal: Optimal Coping  Outcome: Ongoing, Progressing     Problem: Skin Injury Risk Increased  Goal: Skin Health and Integrity  Outcome: Ongoing, Progressing     Problem: Infection (Hemodialysis)  Goal: Absence of Infection Signs and Symptoms  Outcome: Ongoing, Progressing     Problem: Coping Ineffective  Goal: Effective Coping  Outcome: Ongoing, Progressing

## 2022-11-04 NOTE — PROGRESS NOTES
[facility]  Progress Note    Patient Name: Eva Bloom  MRN: [id number]  Patient Class: IP- Inpatient   Admission Date: 10/29/2022  Length of Stay: 6 days  Attending Physician: Rolf Recinos MD  Primary Care Provider: Sowmya Mccain MD        Subjective:     Principal Problem:Sepsis        HPI:  57 year old female, with pertinent medical history of anemia, kidney cancer, CHF with EF of 35%,, HTN, Pafib on OAC,prediabetes, and myocardiopathy, presents to the ED to evaluate her worsening bilateral leg pain and weakness for 5 days. Patient's  states she was seen for the same symptoms 5 days ago as well as sore throat, chest pain, and nausea. She had labwork done and her results were all negative. Sore throat, chest pain, and nausea have resolved. Patient woke up this morning very weak and fatigued. She was late for dialysis and when she arrived, she was advised to come to the ED. Patient received her dialysis 2 days ago as normal. Patient was able to ambulate alone yesterday with pain, but needs support today.per ER record,  reports she has decreased appetite and is no longer producing urine. No pain medications taken today.  notes that a week ago, patient attended an event and possibly had sick contacts. Patient denies congestion, cough, trauma to legs,or other associated symptoms,patient is hypothermic in ER 93%,has elevated lactic acid,more than 12,has leucocytosis 14,patient is septic,can not gave IVF duo to  ESRD,has been  started on broad spectrum IV Abx.he has severe acidosis C O 2 of 8 and elevated Troponin 2.8,she denies chest apian dn abdominal pain,only complain is leg pain.she has also Hyperkalemia 6.0.      Overview/Hospital Course:  57 year old female presents to the ED to evaluate her worsening bilateral leg pain and weakness for 5 days.  Patient was hypothermic in ER with lactic acidosis.  Started on Bicarb drip and underwent HD.  Started on  broad spectrum ABx's.  Elevated Troponin and Cardiology consulted.  Echo shows EF of 25%.  Denies any chest pain and no plans for intervention at this time.  US abdomen shows possible acute cholecystitis.  Surgery consulted.  HIDA unable to eval gallbladder due to poor hepatic clearance. No indication for cholecystostomy drain as pain has improved.  PT/OT consulted.  Recommending Rehab.  SW consulted.  All cultures negative with no obvious source of infection and ABx's stopped.      Interval History: no new complaints.    Review of Systems   HENT:  Negative for ear discharge and ear pain.    Eyes:  Negative for discharge and itching.   Endocrine: Negative for cold intolerance and heat intolerance.   Neurological:  Negative for seizures and syncope.   Objective:     Vital Signs (Most Recent):  Temp: 98.3 °F (36.8 °C) (11/04/22 1525)  Pulse: 97 (11/04/22 1525)  Resp: 18 (11/04/22 1525)  BP: (!) 147/67 (11/04/22 1525)  SpO2: (!) 94 % (11/04/22 1525)   Vital Signs (24h Range):  Temp:  [97.6 °F (36.4 °C)-98.6 °F (37 °C)] 98.3 °F (36.8 °C)  Pulse:  [79-99] 97  Resp:  [16-18] 18  SpO2:  [94 %-98 %] 94 %  BP: (113-147)/(49-75) 147/67     Weight: 50 kg (110 lb 3.7 oz)  Body mass index is 18.92 kg/m².    Intake/Output Summary (Last 24 hours) at 11/4/2022 1529  Last data filed at 11/4/2022 1430  Gross per 24 hour   Intake 1100 ml   Output 1500 ml   Net -400 ml        Physical Exam  Constitutional:       Appearance: She is ill-appearing. She is not diaphoretic.   HENT:      Head: Normocephalic.      Nose: Nose normal.      Mouth/Throat:      Mouth: Mucous membranes are dry.   Eyes:      Extraocular Movements: Extraocular movements intact.      Pupils: Pupils are equal, round, and reactive to light.   Cardiovascular:      Rate and Rhythm: Normal rate and regular rhythm.      Heart sounds: No murmur heard.  Pulmonary:      Effort: No respiratory distress.      Breath sounds: No wheezing.   Abdominal:      General: Abdomen is  flat. There is no distension.      Palpations: Abdomen is soft.      Tenderness: There is no abdominal tenderness.   Musculoskeletal:         General: Swelling and tenderness present.      Cervical back: Normal range of motion and neck supple.   Skin:     Coloration: Skin is not jaundiced.      Findings: No bruising.   Neurological:      General: No focal deficit present.      Cranial Nerves: No cranial nerve deficit.      Motor: No weakness.   Psychiatric:         Speech: Speech is delayed.         Behavior: Behavior is slowed.       Significant Labs: All pertinent labs within the past 24 hours have been reviewed.  BMP:   Recent Labs   Lab 11/04/22  0532   GLU 76      K 3.3*      CO2 25   BUN 39*   CREATININE 4.6*   CALCIUM 8.6*       CBC:   Recent Labs   Lab 11/03/22  0738 11/04/22  0532   WBC 9.38 9.16   HGB 8.3* 8.0*   HCT 25.5* 24.0*   PLT 67* 69*         Significant Imaging: I have reviewed all pertinent imaging results/findings within the past 24 hours.      Assessment/Plan:      * Sepsis  This patient does have evidence of infective focus  My overall impression is sepsis. Vital signs were reviewed and noted in progress note.  Antibiotics given-   Antibiotics (From admission, onward)    Start     Stop Route Frequency Ordered    10/29/22 2100  rifAXIMin tablet 550 mg         -- Oral 2 times daily 10/29/22 1942    10/29/22 1245  mupirocin 2 % ointment         11/03 0859 Nasl 2 times daily 10/29/22 1140        Cultures were taken-   Microbiology Results (last 7 days)     Procedure Component Value Units Date/Time    Blood Culture #1 **CANNOT BE ORDERED STAT** [[id number]] Collected: 10/29/22 0941    Order Status: Completed Specimen: Blood from Peripheral, Hand, Right Updated: 11/02/22 1503     Blood Culture, Routine No Growth after 4 days.    Blood Culture #2 **CANNOT BE ORDERED STAT** [[id number]] Collected: 10/29/22 0915    Order Status: Completed Specimen: Blood from Peripheral, Hand, Right Updated:  11/02/22 1503     Blood Culture, Routine No Growth after 4 days.        Latest lactate reviewed, they are-  No results for input(s): LACTATE in the last 72 hours.    Organ dysfunction indicated by Acute liver injury  Source- initially thought to be acute cholecystitis.  HIDA unable to eval gallbladder due to poor hepatic clearance. No indication for cholecystostomy drain as pain has improved per Surgery.  Unsure about actual source.    No obvious source of infection.  Stopped ABx's.          Debility  PT/OT consulted.  Recommending Rehab.  SW/CM consulted.      Transaminitis  Suspect to liver congestion.  GI following  Improving LFT's.  Noted worsening thrombocytopenia.  Continue to monitor.      Elevated troponin  Denies chest pain,will monitor,checked Echo,has EF of 25%,cardiology is on case,.she denies chest pain.no pan for intervention at this time.      PAF (paroxysmal atrial fibrillation)  Patient with Persistent (7 days or more) atrial fibrillation which is controlled currently with Beta Blocker. Patient is currently in sinus rhythm.[id number] Score: 1. HASBLED Score: . Anticoagulation indicated. Anticoagulation done with eliquis.  On Eliquis        ICD (implantable cardioverter-defibrillator) in place - SubQ  Will monitor.      Chronic kidney disease-mineral and bone disorder  On HD.      Anemia in ESRD (end-stage renal disease)  Will monitor.      Chronic combined systolic and diastolic congestive heart failure        Nonrheumatic mitral valve regurgitation  Per record,      ESRD (end stage renal disease) on dialysis  Nephrology consulted for HD      Pulmonary hypertension  per record.      Cardiomyopathy, nonischemic  Echo shows EF of 25%      S/p nephrectomy left  Per record.      History of kidney cancer  per record.      Essential hypertension  Resumed home medications..        VTE Risk Mitigation (From admission, onward)         Ordered     apixaban tablet 2.5 mg  2 times daily         11/02/22 2664      Place sequential compression device  Until discontinued         11/01/22 0945                Discharge Planning   MAVIS: 11/5/2022     Code Status: Full Code   Is the patient medically ready for discharge?:     Reason for patient still in hospital (select all that apply): Pending disposition  Discharge Plan A: Home Health (Not sure if she wants home health at this time.  Normally able to perform ADLs per spouse.)                  Rolf Recinos MD  Department of Hospital Medicine   [facility]

## 2022-11-04 NOTE — PT/OT/SLP PROGRESS
Occupational Therapy      Patient Name:  Eva Bloom   MRN:  [id number]    Patient not seen today secondary to Dialysis. Will follow-up as able.    11/4/2022

## 2022-11-04 NOTE — PROGRESS NOTES
Awake seen while on HD     NAD    Some soreness in her abd not very localized seems better today     ROS Denies cns ent cp gu rheum sx     Past Medical History:   Diagnosis Date    Anemia     Bronchitis 03/01/2017    Cancer 2016    kidney cancer    CHF (congestive heart failure), NYHA class II, chronic, systolic     CMV (cytomegalovirus) antibody positive     Essential hypertension 9/23/2015    H/O herpes simplex type 2 infection     Herpes simplex type 1 antibody positive     History of kidney cancer     s/p left nephrectomy 1/2016    Hyperparathyroidism, unspecified     Hyperuricemia without signs of inflammatory arthritis and tophaceous disease     Kidney stones     LGSIL (low grade squamous intraepithelial dysplasia)     Myocardiopathy 7/21/2017    Prediabetes     Proteinuria     Renal disorder     Thyroid nodule     Urate nephropathy      Past Surgical History:   Procedure Laterality Date    BREAST CYST EXCISION      COLONOSCOPY N/A 11/12/2015    COLONOSCOPY N/A 3/12/2021    Procedure: COLONOSCOPY;  Surgeon: Brendon Lanier MD;  Location: [facility];  Service: Endoscopy;  Laterality: N/A;  covid test 3/9, labs prior, prep instr mailed -ml    INSERTION OF BIVENTRICULAR IMPLANTABLE CARDIOVERTER-DEFIBRILLATOR (ICD)  04/2021    NEPHRECTOMY-LAPAROSCOPIC Left 01/12/2016    PERITONEAL CATHETER INSERTION      Permacath insertion  01/12/2017    SALPINGOOPHORECTOMY Right 2016    KJB---DAVINCI    TONSILLECTOMY      TUBAL LIGATION       Review of patient's allergies indicates:   Allergen Reactions    Coreg [carvedilol] Other (See Comments)     Nausea/vomiting    Allopurinol      Other reaction(s): abnormal transaminases       Current Facility-Administered Medications   Medication    0.9%  NaCl infusion    0.9%  NaCl infusion    acetaminophen tablet 650 mg    amLODIPine tablet 10 mg    apixaban tablet 2.5 mg    dextrose 10% bolus 250 mL    epoetin david-epbx injection 2,520 Units    hydrALAZINE injection 20 mg     hydrALAZINE tablet 100 mg    labetaloL injection 10 mg    metoprolol succinate (TOPROL-XL) 24 hr tablet 100 mg    ondansetron injection 8 mg    polyethylene glycol packet 17 g    rifAXIMin tablet 550 mg    sacubitriL-valsartan 24-26 mg per tablet 2 tablet    sodium chloride 0.9% bolus 250 mL       LABS    Recent Results (from the past 24 hour(s))   AFP tumor marker    Collection Time: 11/03/22 11:45 AM   Result Value Ref Range    AFP 7.1 0.0 - 8.4 ng/mL   CBC auto differential    Collection Time: 11/04/22  5:32 AM   Result Value Ref Range    WBC 9.16 3.90 - 12.70 K/uL    RBC 2.40 (L) 4.00 - 5.40 M/uL    Hemoglobin 8.0 (L) 12.0 - 16.0 g/dL    Hematocrit 24.0 (L) 37.0 - 48.5 %     (H) 82 - 98 fL    MCH 33.3 (H) 27.0 - 31.0 pg    MCHC 33.3 32.0 - 36.0 g/dL    RDW 23.5 (H) 11.5 - 14.5 %    Platelets 69 (L) 150 - 450 K/uL    MPV 11.4 9.2 - 12.9 fL    Immature Granulocytes 0.9 (H) 0.0 - 0.5 %    Gran # (ANC) 7.5 1.8 - 7.7 K/uL    Immature Grans (Abs) 0.08 (H) 0.00 - 0.04 K/uL    Lymph # 0.5 (L) 1.0 - 4.8 K/uL    Mono # 0.9 0.3 - 1.0 K/uL    Eos # 0.2 0.0 - 0.5 K/uL    Baso # 0.01 0.00 - 0.20 K/uL    nRBC 1 (A) 0 /100 WBC    Gran % 81.3 (H) 38.0 - 73.0 %    Lymph % 5.9 (L) 18.0 - 48.0 %    Mono % 9.5 4.0 - 15.0 %    Eosinophil % 2.3 0.0 - 8.0 %    Basophil % 0.1 0.0 - 1.9 %    Differential Method Automated    Comprehensive metabolic panel    Collection Time: 11/04/22  5:32 AM   Result Value Ref Range    Sodium 141 136 - 145 mmol/L    Potassium 3.3 (L) 3.5 - 5.1 mmol/L    Chloride 104 95 - 110 mmol/L    CO2 25 23 - 29 mmol/L    Glucose 76 70 - 110 mg/dL    BUN 39 (H) 6 - 20 mg/dL    Creatinine 4.6 (H) 0.5 - 1.4 mg/dL    Calcium 8.6 (L) 8.7 - 10.5 mg/dL    Total Protein 5.1 (L) 6.0 - 8.4 g/dL    Albumin 1.8 (L) 3.5 - 5.2 g/dL    Total Bilirubin 2.9 (H) 0.1 - 1.0 mg/dL    Alkaline Phosphatase 429 (H) 55 - 135 U/L     (H) 10 - 40 U/L     (H) 10 - 44 U/L    Anion Gap 12 8 - 16 mmol/L    eGFR 10 (A) >60  mL/min/1.73 m^2   Protime-INR    Collection Time: 11/04/22  5:32 AM   Result Value Ref Range    Prothrombin Time 13.4 (H) 9.0 - 12.5 sec    INR 1.3 (H) 0.8 - 1.2   ]    I/O last 3 completed shifts:  In: 1456.6 [P.O.:960; IV Piggyback:496.6]  Out: -     Vitals:    11/03/22 2119 11/03/22 2336 11/04/22 0326 11/04/22 0701   BP: 133/68 (!) 113/58 (!) 141/66 (!) 126/58   Pulse: 84 92 97 91   Resp: 17 16 16 16   Temp:  98.1 °F (36.7 °C) 98.3 °F (36.8 °C) 98.1 °F (36.7 °C)   TempSrc:  Oral Oral Oral   SpO2: 96% 98% (!) 94% (!) 94%   Weight:       Height:           No Jvd, Thyromegaly or Lymphadenopathy  Lungs: Fairly clear anteriorly and laterally but few diffuse faint lat rhales   Cor: RRR no G or rubs  Abd: Soft benign good bowel sounds non tender  Ext: No E C C    A)    ESRD on HD MWF   Sepsis, ? GB infection pain better no surgical intervention planned   CHF/CMP  Pulm htn   Anemia  2nd hyperpth  PAF  Lactic acidosis  HX of RCC sp nephrectomy     P)    HD TIW MWF   Epo prn  Nutritional support  Binders when able   Antibiotx as needed adjusted to renal fx       Reconsider entresto     Px guarded

## 2022-11-04 NOTE — PT/OT/SLP PROGRESS
Physical Therapy      Patient Name:  Eva Bloom   MRN:  [id number]    Patient not seen today secondary to Dialysis (2nd attempt). Will follow-up Sunday.

## 2022-11-04 NOTE — PT/OT/SLP PROGRESS
Physical Therapy      Patient Name:  Eva Bloom   MRN:  [id number]    Patient not seen today secondary to Dialysis. Will follow-up later as able.

## 2022-11-04 NOTE — SUBJECTIVE & OBJECTIVE
Interval History: no new complaints.    Review of Systems   HENT:  Negative for ear discharge and ear pain.    Eyes:  Negative for discharge and itching.   Endocrine: Negative for cold intolerance and heat intolerance.   Neurological:  Negative for seizures and syncope.   Objective:     Vital Signs (Most Recent):  Temp: 98.3 °F (36.8 °C) (11/04/22 1525)  Pulse: 97 (11/04/22 1525)  Resp: 18 (11/04/22 1525)  BP: (!) 147/67 (11/04/22 1525)  SpO2: (!) 94 % (11/04/22 1525)   Vital Signs (24h Range):  Temp:  [97.6 °F (36.4 °C)-98.6 °F (37 °C)] 98.3 °F (36.8 °C)  Pulse:  [79-99] 97  Resp:  [16-18] 18  SpO2:  [94 %-98 %] 94 %  BP: (113-147)/(49-75) 147/67     Weight: 50 kg (110 lb 3.7 oz)  Body mass index is 18.92 kg/m².    Intake/Output Summary (Last 24 hours) at 11/4/2022 1529  Last data filed at 11/4/2022 1430  Gross per 24 hour   Intake 1100 ml   Output 1500 ml   Net -400 ml        Physical Exam  Constitutional:       Appearance: She is ill-appearing. She is not diaphoretic.   HENT:      Head: Normocephalic.      Nose: Nose normal.      Mouth/Throat:      Mouth: Mucous membranes are dry.   Eyes:      Extraocular Movements: Extraocular movements intact.      Pupils: Pupils are equal, round, and reactive to light.   Cardiovascular:      Rate and Rhythm: Normal rate and regular rhythm.      Heart sounds: No murmur heard.  Pulmonary:      Effort: No respiratory distress.      Breath sounds: No wheezing.   Abdominal:      General: Abdomen is flat. There is no distension.      Palpations: Abdomen is soft.      Tenderness: There is no abdominal tenderness.   Musculoskeletal:         General: Swelling and tenderness present.      Cervical back: Normal range of motion and neck supple.   Skin:     Coloration: Skin is not jaundiced.      Findings: No bruising.   Neurological:      General: No focal deficit present.      Cranial Nerves: No cranial nerve deficit.      Motor: No weakness.   Psychiatric:         Speech: Speech is  delayed.         Behavior: Behavior is slowed.       Significant Labs: All pertinent labs within the past 24 hours have been reviewed.  BMP:   Recent Labs   Lab 11/04/22  0532   GLU 76      K 3.3*      CO2 25   BUN 39*   CREATININE 4.6*   CALCIUM 8.6*       CBC:   Recent Labs   Lab 11/03/22  0738 11/04/22  0532   WBC 9.38 9.16   HGB 8.3* 8.0*   HCT 25.5* 24.0*   PLT 67* 69*         Significant Imaging: I have reviewed all pertinent imaging results/findings within the past 24 hours.

## 2022-11-04 NOTE — ASSESSMENT & PLAN NOTE
Patient with Persistent (7 days or more) atrial fibrillation which is controlled currently with Beta Blocker. Patient is currently in sinus rhythm.[id number] Score: 1. HASBLED Score: . Anticoagulation indicated. Anticoagulation done with eliquis.  On Eliquis

## 2022-11-05 PROCEDURE — 25000003 PHARM REV CODE 250: Performed by: HOSPITALIST

## 2022-11-05 PROCEDURE — 11000001 HC ACUTE MED/SURG PRIVATE ROOM

## 2022-11-05 PROCEDURE — 97530 THERAPEUTIC ACTIVITIES: CPT

## 2022-11-05 RX ORDER — LOPERAMIDE HYDROCHLORIDE 2 MG/1
2 CAPSULE ORAL 4 TIMES DAILY PRN
Status: DISCONTINUED | OUTPATIENT
Start: 2022-11-05 | End: 2022-11-06 | Stop reason: HOSPADM

## 2022-11-05 RX ADMIN — RIFAXIMIN 550 MG: 550 TABLET ORAL at 08:11

## 2022-11-05 RX ADMIN — AMLODIPINE BESYLATE 10 MG: 5 TABLET ORAL at 08:11

## 2022-11-05 RX ADMIN — SACUBITRIL AND VALSARTAN 2 TABLET: 24; 26 TABLET, FILM COATED ORAL at 08:11

## 2022-11-05 RX ADMIN — APIXABAN 2.5 MG: 2.5 TABLET, FILM COATED ORAL at 08:11

## 2022-11-05 RX ADMIN — METOPROLOL SUCCINATE 100 MG: 50 TABLET, EXTENDED RELEASE ORAL at 08:11

## 2022-11-05 RX ADMIN — HYDRALAZINE HYDROCHLORIDE 100 MG: 25 TABLET, FILM COATED ORAL at 03:11

## 2022-11-05 NOTE — SUBJECTIVE & OBJECTIVE
Interval History: several episodes of diarrhea.    Review of Systems   HENT:  Negative for ear discharge and ear pain.    Eyes:  Negative for discharge and itching.   Endocrine: Negative for cold intolerance and heat intolerance.   Neurological:  Negative for seizures and syncope.   Objective:     Vital Signs (Most Recent):  Temp: 98.2 °F (36.8 °C) (11/05/22 0727)  Pulse: 78 (11/05/22 0727)  Resp: 17 (11/05/22 0727)  BP: 125/67 (11/05/22 0727)  SpO2: 95 % (11/05/22 0727)   Vital Signs (24h Range):  Temp:  [97.8 °F (36.6 °C)-98.6 °F (37 °C)] 98.2 °F (36.8 °C)  Pulse:  [77-99] 78  Resp:  [16-18] 17  SpO2:  [93 %-96 %] 95 %  BP: (117-147)/(49-75) 125/67     Weight: 50 kg (110 lb 3.7 oz)  Body mass index is 18.92 kg/m².    Intake/Output Summary (Last 24 hours) at 11/5/2022 1001  Last data filed at 11/5/2022 0815  Gross per 24 hour   Intake 980 ml   Output 1500 ml   Net -520 ml        Physical Exam  Constitutional:       Appearance: She is ill-appearing. She is not diaphoretic.   HENT:      Head: Normocephalic.      Nose: Nose normal.      Mouth/Throat:      Mouth: Mucous membranes are dry.   Eyes:      Extraocular Movements: Extraocular movements intact.      Pupils: Pupils are equal, round, and reactive to light.   Cardiovascular:      Rate and Rhythm: Normal rate and regular rhythm.      Heart sounds: No murmur heard.  Pulmonary:      Effort: No respiratory distress.      Breath sounds: No wheezing.   Abdominal:      General: Abdomen is flat. There is no distension.      Palpations: Abdomen is soft.      Tenderness: There is no abdominal tenderness.   Musculoskeletal:         General: No deformity or signs of injury.      Cervical back: Normal range of motion and neck supple.   Skin:     Coloration: Skin is not jaundiced.      Findings: No bruising.   Neurological:      General: No focal deficit present.      Cranial Nerves: No cranial nerve deficit.      Motor: No weakness.       Significant Labs: All pertinent labs  within the past 24 hours have been reviewed.  BMP:   Recent Labs   Lab 11/04/22  0532   GLU 76      K 3.3*      CO2 25   BUN 39*   CREATININE 4.6*   CALCIUM 8.6*       CBC:   Recent Labs   Lab 11/04/22  0532   WBC 9.16   HGB 8.0*   HCT 24.0*   PLT 69*         Significant Imaging: I have reviewed all pertinent imaging results/findings within the past 24 hours.

## 2022-11-05 NOTE — NURSING
Patient with three bowel movements. Notified Genna Harmon Np at this time to get something ordered to help stop bowel movements. Np placed order for c-diff collection. Notifed [facility] Np that patient does not qualify for c-diff testing.

## 2022-11-05 NOTE — NURSING
Notified by [facility] of abnormal heart rhythm. Patient had a 9.25sec of psvt. Notified Genna Harmon Np.

## 2022-11-05 NOTE — PROGRESS NOTES
[facility] Medicine  Progress Note    Patient Name: Eva Bloom  MRN: [id number]  Patient Class: IP- Inpatient   Admission Date: 10/29/2022  Length of Stay: 7 days  Attending Physician: Rolf Recinos MD  Primary Care Provider: Sowmya Mccain MD        Subjective:     Principal Problem:Sepsis        HPI:  57 year old female, with pertinent medical history of anemia, kidney cancer, CHF with EF of 35%,, HTN, Pafib on OAC,prediabetes, and myocardiopathy, presents to the ED to evaluate her worsening bilateral leg pain and weakness for 5 days. Patient's  states she was seen for the same symptoms 5 days ago as well as sore throat, chest pain, and nausea. She had labwork done and her results were all negative. Sore throat, chest pain, and nausea have resolved. Patient woke up this morning very weak and fatigued. She was late for dialysis and when she arrived, she was advised to come to the ED. Patient received her dialysis 2 days ago as normal. Patient was able to ambulate alone yesterday with pain, but needs support today.per ER record,  reports she has decreased appetite and is no longer producing urine. No pain medications taken today.  notes that a week ago, patient attended an event and possibly had sick contacts. Patient denies congestion, cough, trauma to legs,or other associated symptoms,patient is hypothermic in ER 93%,has elevated lactic acid,more than 12,has leucocytosis 14,patient is septic,can not gave IVF duo to  ESRD,has been  started on broad spectrum IV Abx.he has severe acidosis C O 2 of 8 and elevated Troponin 2.8,she denies chest apian dn abdominal pain,only complain is leg pain.she has also Hyperkalemia 6.0.      Overview/Hospital Course:  57 year old female presents to the ED to evaluate her worsening bilateral leg pain and weakness for 5 days.  Patient was hypothermic in ER with lactic acidosis.  Started on Bicarb drip and underwent HD.  Started on  broad spectrum ABx's.  Elevated Troponin and Cardiology consulted.  Echo shows EF of 25%.  Denies any chest pain and no plans for intervention at this time.  US abdomen shows possible acute cholecystitis.  Surgery consulted.  HIDA unable to eval gallbladder due to poor hepatic clearance. No indication for cholecystostomy drain as pain has improved.  PT/OT consulted.  Recommending Rehab.  SW consulted.  All cultures negative with no obvious source of infection and ABx's stopped.      Interval History: several episodes of diarrhea.    Review of Systems   HENT:  Negative for ear discharge and ear pain.    Eyes:  Negative for discharge and itching.   Endocrine: Negative for cold intolerance and heat intolerance.   Neurological:  Negative for seizures and syncope.   Objective:     Vital Signs (Most Recent):  Temp: 98.2 °F (36.8 °C) (11/05/22 0727)  Pulse: 78 (11/05/22 0727)  Resp: 17 (11/05/22 0727)  BP: 125/67 (11/05/22 0727)  SpO2: 95 % (11/05/22 0727)   Vital Signs (24h Range):  Temp:  [97.8 °F (36.6 °C)-98.6 °F (37 °C)] 98.2 °F (36.8 °C)  Pulse:  [77-99] 78  Resp:  [16-18] 17  SpO2:  [93 %-96 %] 95 %  BP: (117-147)/(49-75) 125/67     Weight: 50 kg (110 lb 3.7 oz)  Body mass index is 18.92 kg/m².    Intake/Output Summary (Last 24 hours) at 11/5/2022 1001  Last data filed at 11/5/2022 0815  Gross per 24 hour   Intake 980 ml   Output 1500 ml   Net -520 ml        Physical Exam  Constitutional:       Appearance: She is ill-appearing. She is not diaphoretic.   HENT:      Head: Normocephalic.      Nose: Nose normal.      Mouth/Throat:      Mouth: Mucous membranes are dry.   Eyes:      Extraocular Movements: Extraocular movements intact.      Pupils: Pupils are equal, round, and reactive to light.   Cardiovascular:      Rate and Rhythm: Normal rate and regular rhythm.      Heart sounds: No murmur heard.  Pulmonary:      Effort: No respiratory distress.      Breath sounds: No wheezing.   Abdominal:      General: Abdomen is  flat. There is no distension.      Palpations: Abdomen is soft.      Tenderness: There is no abdominal tenderness.   Musculoskeletal:         General: No deformity or signs of injury.      Cervical back: Normal range of motion and neck supple.   Skin:     Coloration: Skin is not jaundiced.      Findings: No bruising.   Neurological:      General: No focal deficit present.      Cranial Nerves: No cranial nerve deficit.      Motor: No weakness.       Significant Labs: All pertinent labs within the past 24 hours have been reviewed.  BMP:   Recent Labs   Lab 11/04/22  0532   GLU 76      K 3.3*      CO2 25   BUN 39*   CREATININE 4.6*   CALCIUM 8.6*       CBC:   Recent Labs   Lab 11/04/22  0532   WBC 9.16   HGB 8.0*   HCT 24.0*   PLT 69*         Significant Imaging: I have reviewed all pertinent imaging results/findings within the past 24 hours.      Assessment/Plan:      * Sepsis  This patient does have evidence of infective focus  My overall impression is sepsis. Vital signs were reviewed and noted in progress note.  Antibiotics given-   Antibiotics (From admission, onward)    Start     Stop Route Frequency Ordered    10/29/22 2100  rifAXIMin tablet 550 mg         -- Oral 2 times daily 10/29/22 1942    10/29/22 1245  mupirocin 2 % ointment         11/03 0859 Nasl 2 times daily 10/29/22 1140        Cultures were taken-   Microbiology Results (last 7 days)     Procedure Component Value Units Date/Time    Clostridium difficile EIA [[id number]]     Order Status: Canceled Specimen: Stool     Blood Culture #1 **CANNOT BE ORDERED STAT** [[id number]] Collected: 10/29/22 0941    Order Status: Completed Specimen: Blood from Peripheral, Hand, Right Updated: 11/02/22 1503     Blood Culture, Routine No Growth after 4 days.    Blood Culture #2 **CANNOT BE ORDERED STAT** [[id number]] Collected: 10/29/22 0915    Order Status: Completed Specimen: Blood from Peripheral, Hand, Right Updated: 11/02/22 1503     Blood Culture,  Routine No Growth after 4 days.        Latest lactate reviewed, they are-  No results for input(s): LACTATE in the last 72 hours.    Organ dysfunction indicated by Acute liver injury  Source- initially thought to be acute cholecystitis.  HIDA unable to eval gallbladder due to poor hepatic clearance. No indication for cholecystostomy drain as pain has improved per Surgery.  Unsure about actual source.    No obvious source of infection.  Stopped ABx's.  Possibly home tomorrow.          Debility  PT/OT consulted.  Recommending Rehab.  SW/CM consulted.  Patient and  refusing Rehab and want home with HH.  Home tomorrow if stable.    Transaminitis  Suspect to liver congestion.  GI following  Improving LFT's.  Repeat labs in Am.      Elevated troponin  No plan for intervention at this time.      PAF (paroxysmal atrial fibrillation)  Patient with Persistent (7 days or more) atrial fibrillation which is controlled currently with Beta Blocker. Patient is currently in sinus rhythm.[id number] Score: 1. HASBLED Score: . Anticoagulation indicated. Anticoagulation done with eliquis.  On Eliquis        ICD (implantable cardioverter-defibrillator) in place - SubQ  Will monitor.      Chronic kidney disease-mineral and bone disorder  On HD.      Anemia in ESRD (end-stage renal disease)  Will monitor.      Chronic combined systolic and diastolic congestive heart failure        Nonrheumatic mitral valve regurgitation  Per record,      ESRD (end stage renal disease) on dialysis  Nephrology consulted for HD      Pulmonary hypertension  per record.      Cardiomyopathy, nonischemic  Echo shows EF of 25%      S/p nephrectomy left  Per record.      History of kidney cancer  per record.      Essential hypertension  Resumed home medications..        VTE Risk Mitigation (From admission, onward)         Ordered     apixaban tablet 2.5 mg  2 times daily         11/02/22 1520     Place sequential compression device  Until discontinued          11/01/22 0945                Discharge Planning   MAVIS: 11/5/2022     Code Status: Full Code   Is the patient medically ready for discharge?:     Reason for patient still in hospital (select all that apply): Patient trending condition  Discharge Plan A: Home Health   Discharge Delays: (!) Post-Acute Set-up              Rolf Recinos MD  Department of Hospital Medicine   [facility] Surg

## 2022-11-05 NOTE — ASSESSMENT & PLAN NOTE
PT/OT consulted.  Recommending Rehab.  SW/CM consulted.  Patient and  refusing Rehab and want home with HH.  Home tomorrow if stable.

## 2022-11-05 NOTE — ASSESSMENT & PLAN NOTE
This patient does have evidence of infective focus  My overall impression is sepsis. Vital signs were reviewed and noted in progress note.  Antibiotics given-   Antibiotics (From admission, onward)    Start     Stop Route Frequency Ordered    10/29/22 2100  rifAXIMin tablet 550 mg         -- Oral 2 times daily 10/29/22 1942    10/29/22 1245  mupirocin 2 % ointment         11/03 0859 Nasl 2 times daily 10/29/22 1140        Cultures were taken-   Microbiology Results (last 7 days)     Procedure Component Value Units Date/Time    Clostridium difficile EIA [[id number]]     Order Status: Canceled Specimen: Stool     Blood Culture #1 **CANNOT BE ORDERED STAT** [[id number]] Collected: 10/29/22 0941    Order Status: Completed Specimen: Blood from Peripheral, Hand, Right Updated: 11/02/22 1503     Blood Culture, Routine No Growth after 4 days.    Blood Culture #2 **CANNOT BE ORDERED STAT** [[id number]] Collected: 10/29/22 0915    Order Status: Completed Specimen: Blood from Peripheral, Hand, Right Updated: 11/02/22 1503     Blood Culture, Routine No Growth after 4 days.        Latest lactate reviewed, they are-  No results for input(s): LACTATE in the last 72 hours.    Organ dysfunction indicated by Acute liver injury  Source- initially thought to be acute cholecystitis.  HIDA unable to eval gallbladder due to poor hepatic clearance. No indication for cholecystostomy drain as pain has improved per Surgery.  Unsure about actual source.    No obvious source of infection.  Stopped ABx's.  Possibly home tomorrow.

## 2022-11-05 NOTE — PT/OT/SLP PROGRESS
Occupational Therapy   Treatment    Name: Eva Bloom  MRN: [id number]  Admitting Diagnosis:  Sepsis       Recommendations:     Discharge Recommendations: rehabilitation facility however patient and  want home health   Discharge Equipment Recommendations:  hospital bed, bedside commode, walker, rolling  Barriers to discharge:  Other (Comment) (requires increased assistance for ADL's and functional mobility)    Assessment:     Eva Bloom is a 57 y.o. female with a medical diagnosis of Sepsis.  She presents with decreased strength and endurance for ADL performance and functional mobility. Performance deficits affecting function are weakness, impaired endurance, impaired self care skills, impaired functional mobility, gait instability, impaired balance, decreased upper extremity function, decreased lower extremity function. She would continue to benefit from OT in order to decrease assistance required to perform ADL's and get back to being independent.     Rehab Prognosis:  Good; patient would benefit from acute skilled OT services to address these deficits and reach maximum level of function.       Plan:     Patient to be seen 5 x/week to address the above listed problems via self-care/home management, therapeutic activities, therapeutic exercises  Plan of Care Expires: 11/17/22  Plan of Care Reviewed with: patient, spouse    Subjective     Pain/Comfort:  Pain Rating 1: 0/10    Objective:     Communicated with: RN prior to session.  Patient found HOB elevated with  upon OT entry to room.  came to the room in the middle of session.     General Precautions: Standard, fall   Orthopedic Precautions:N/A   Braces: N/A  Respiratory Status: Room air     Occupational Performance:     Bed Mobility:    Patient completed Scooting/Bridging with stand by assistance  Patient completed Supine to Sit with stand by assistance     Functional Mobility/Transfers:  Patient completed Sit <> Stand Transfer with minimum  assistance with  rolling walker x2 trials from elevated bed. Patient initially tried to stand from bed in the lowest position and she was unable to complete task.   Functional Mobility: Patient took steps from bed to bedside chair with RW and minimal assistance. She was unable to ambulate to sink/bathroom due to weakness.    Activities of Daily Living:  Upper Body Dressing: minimum assistance to don extra gown  Grooming:  said he would help her perform later today       [facility] 6 Click ADL: 16    Treatment & Education:  OT role and POC.     Patient left up in chair with all lines intact, call button in reach, RN notified, and  present    GOALS:   Multidisciplinary Problems       Occupational Therapy Goals          Problem: Occupational Therapy    Goal Priority Disciplines Outcome Interventions   Occupational Therapy Goal     OT, PT/OT Ongoing, Progressing    Description: Goals to be met by: 11/17/22     Patient will increase functional independence with ADLs by performing:    UE Dressing with Modified [place].  LE Dressing with Stand-by Assistance.  Grooming while seated with Modified [place].  Toileting from bedside commode with Stand-by Assistance and Assistive Devices as needed for hygiene and clothing management.   Rolling to Bilateral with Modified [place].   Supine to sit with Modified [place].  Step transfer with Contact Guard Assistance  Toilet transfer to bedside commode with Contact Guard Assistance.  Upper extremity exercise program x15 reps per handout, with independence.                         Time Tracking:     OT Date of Treatment: 11/05/22  OT Start Time: 1214  OT Stop Time: 1237  OT Total Time (min): 23 min    Billable Minutes:Therapeutic Activity 23    OT/CORNELL: OT     CORNELL Visit Number: 0    11/5/2022

## 2022-11-05 NOTE — ASSESSMENT & PLAN NOTE
Patient with Persistent (7 days or more) atrial fibrillation which is controlled currently with Beta Blocker. Patient is currently in sinus rhythm.[id number] Score: 1. HASBLED Score: . Anticoagulation indicated. Anticoagulation done with eliquis.  On Eliquis

## 2022-11-06 VITALS
SYSTOLIC BLOOD PRESSURE: 140 MMHG | HEART RATE: 76 BPM | TEMPERATURE: 98 F | HEIGHT: 64 IN | BODY MASS INDEX: 18.82 KG/M2 | DIASTOLIC BLOOD PRESSURE: 68 MMHG | WEIGHT: 110.25 LBS | OXYGEN SATURATION: 96 % | RESPIRATION RATE: 18 BRPM

## 2022-11-06 PROBLEM — A41.9 SEPSIS: Status: RESOLVED | Noted: 2022-10-29 | Resolved: 2022-11-06

## 2022-11-06 PROBLEM — R79.89 ELEVATED TROPONIN: Status: RESOLVED | Noted: 2022-10-29 | Resolved: 2022-11-06

## 2022-11-06 LAB
ALBUMIN SERPL BCP-MCNC: 1.7 G/DL (ref 3.5–5.2)
ALP SERPL-CCNC: 430 U/L (ref 55–135)
ALT SERPL W/O P-5'-P-CCNC: 113 U/L (ref 10–44)
ANION GAP SERPL CALC-SCNC: 11 MMOL/L (ref 8–16)
AST SERPL-CCNC: 117 U/L (ref 10–40)
BASOPHILS # BLD AUTO: 0.02 K/UL (ref 0–0.2)
BASOPHILS NFR BLD: 0.2 % (ref 0–1.9)
BILIRUB SERPL-MCNC: 2.3 MG/DL (ref 0.1–1)
BUN SERPL-MCNC: 34 MG/DL (ref 6–20)
CALCIUM SERPL-MCNC: 9.1 MG/DL (ref 8.7–10.5)
CHLORIDE SERPL-SCNC: 109 MMOL/L (ref 95–110)
CO2 SERPL-SCNC: 21 MMOL/L (ref 23–29)
CREAT SERPL-MCNC: 4.2 MG/DL (ref 0.5–1.4)
DIFFERENTIAL METHOD: ABNORMAL
EOSINOPHIL # BLD AUTO: 0.2 K/UL (ref 0–0.5)
EOSINOPHIL NFR BLD: 2.1 % (ref 0–8)
ERYTHROCYTE [DISTWIDTH] IN BLOOD BY AUTOMATED COUNT: 23.3 % (ref 11.5–14.5)
EST. GFR  (NO RACE VARIABLE): 12 ML/MIN/1.73 M^2
GLUCOSE SERPL-MCNC: 89 MG/DL (ref 70–110)
HCT VFR BLD AUTO: 23.6 % (ref 37–48.5)
HGB BLD-MCNC: 7.7 G/DL (ref 12–16)
IMM GRANULOCYTES # BLD AUTO: 0.07 K/UL (ref 0–0.04)
IMM GRANULOCYTES NFR BLD AUTO: 0.7 % (ref 0–0.5)
LYMPHOCYTES # BLD AUTO: 0.7 K/UL (ref 1–4.8)
LYMPHOCYTES NFR BLD: 7.4 % (ref 18–48)
MCH RBC QN AUTO: 33.2 PG (ref 27–31)
MCHC RBC AUTO-ENTMCNC: 32.6 G/DL (ref 32–36)
MCV RBC AUTO: 102 FL (ref 82–98)
MONOCYTES # BLD AUTO: 0.9 K/UL (ref 0.3–1)
MONOCYTES NFR BLD: 9.2 % (ref 4–15)
NEUTROPHILS # BLD AUTO: 8 K/UL (ref 1.8–7.7)
NEUTROPHILS NFR BLD: 80.4 % (ref 38–73)
NRBC BLD-RTO: 0 /100 WBC
PLATELET # BLD AUTO: 108 K/UL (ref 150–450)
PMV BLD AUTO: 11.9 FL (ref 9.2–12.9)
POCT GLUCOSE: 142 MG/DL (ref 70–110)
POTASSIUM SERPL-SCNC: 3.4 MMOL/L (ref 3.5–5.1)
PROT SERPL-MCNC: 5.5 G/DL (ref 6–8.4)
RBC # BLD AUTO: 2.32 M/UL (ref 4–5.4)
SODIUM SERPL-SCNC: 141 MMOL/L (ref 136–145)
WBC # BLD AUTO: 9.93 K/UL (ref 3.9–12.7)

## 2022-11-06 PROCEDURE — 25000003 PHARM REV CODE 250: Performed by: HOSPITALIST

## 2022-11-06 PROCEDURE — 80053 COMPREHEN METABOLIC PANEL: CPT | Performed by: HOSPITALIST

## 2022-11-06 PROCEDURE — 85025 COMPLETE CBC W/AUTO DIFF WBC: CPT | Performed by: HOSPITALIST

## 2022-11-06 RX ORDER — SEVELAMER CARBONATE 800 MG/1
1600 TABLET, FILM COATED ORAL
Qty: 180 TABLET | Refills: 11 | Status: SHIPPED | OUTPATIENT
Start: 2022-11-06 | End: 2022-11-16

## 2022-11-06 RX ADMIN — RIFAXIMIN 550 MG: 550 TABLET ORAL at 08:11

## 2022-11-06 RX ADMIN — APIXABAN 2.5 MG: 2.5 TABLET, FILM COATED ORAL at 08:11

## 2022-11-06 RX ADMIN — SACUBITRIL AND VALSARTAN 2 TABLET: 24; 26 TABLET, FILM COATED ORAL at 08:11

## 2022-11-06 RX ADMIN — HYDRALAZINE HYDROCHLORIDE 100 MG: 25 TABLET, FILM COATED ORAL at 05:11

## 2022-11-06 RX ADMIN — AMLODIPINE BESYLATE 10 MG: 5 TABLET ORAL at 08:11

## 2022-11-06 RX ADMIN — METOPROLOL SUCCINATE 100 MG: 50 TABLET, EXTENDED RELEASE ORAL at 08:11

## 2022-11-06 NOTE — PLAN OF CARE
[place] - Med Surg  Discharge Final Note    Primary Care Provider: Sowmya Mccain MD    Expected Discharge Date: 11/6/2022    All CM needs met. Pt will resume dialysis with [facility] on [facility]TH[facility]. CM sent HH orders to [facility] and spoke to Genna that pt was discharging today. Bedside commode and walker was delivered to bedside. DME will contact pt's , Otis to schedule delivery for hospital bed.CM notified nurseTalia that pt is ready for discharge from CM standpoint.    Final Discharge Note (most recent)       Final Note - 11/06/22 0951          Final Note    Assessment Type Final Discharge Note (P)      Anticipated Discharge Disposition Home-Health Care Svc (P)      What phone number can be called within the next 1-3 days to see how you are doing after discharge? [phone] (P)      Hospital Resources/Appts/Education Provided Appointments scheduled and added to AVS;Appointments scheduled by Navigator/Coordinator (P)         Post-Acute Status    Post-Acute Authorization Home Health (P)      Home Health Status Set-up Complete/Auth obtained (P)      Coverage [employer]BS (P)      Patient choice form signed by patient/caregiver List from System Post-Acute Care (P)      Discharge Delays None known at this time (P)                      Important Message from Medicare             Contact Info       [facility] [place]   Specialty: Home Health Services    [address]  [address]  [place] [address]   Phone: [phone]       Next Steps: Follow up    Instructions: Office will call patient

## 2022-11-06 NOTE — PLAN OF CARE
[facility] [place] - [facility] Surg    HOME HEALTH ORDERS  FACE TO FACE ENCOUNTER    Patient Name: Eva Bloom  YOB: 1965    PCP: Sowmya Mccain MD   PCP Address: [facility] / [place] [address]  PCP Phone Number: [phone]  PCP Fax: [phone]       Encounter Date: 11/06/2022    Admit to Home Health    Diagnoses:  Active Hospital Problems    Diagnosis  POA    Debility [R53.81]  Yes     Priority: 2     Transaminitis [R74.01]  Yes    PAF (paroxysmal atrial fibrillation) [I48.0]  Yes    ICD (implantable cardioverter-defibrillator) in place - SubQ [Z95.810]  Yes    Anemia in ESRD (end-stage renal disease) [N18.6, D63.1]  Yes    Chronic kidney disease-mineral and bone disorder [N18.9, E83.9, M89.9]  Yes    Chronic combined systolic and diastolic congestive heart failure [I50.42]  Yes     - s/p ICD      Nonrheumatic mitral valve regurgitation [I34.0]  Yes    ESRD (end stage renal disease) on dialysis [N18.6, Z99.2]  Not Applicable     - Tuesday, Thursday, and Saturday  - Chelsie Hart, nephrology      Cardiomyopathy, nonischemic [I42.8]  Yes    Pulmonary hypertension [I27.20]  Yes    S/p nephrectomy left [Z90.5]  Not Applicable     Chronic    History of kidney cancer [Z85.528]  Not Applicable    Essential hypertension [I10]  Yes      Resolved Hospital Problems    Diagnosis Date Resolved POA    *Sepsis [A41.9] 11/06/2022 Yes     Priority: 1 - High    Lactic acidosis [E87.20] 11/01/2022 Yes    Elevated troponin [R77.8] 11/06/2022 Yes    Hyperkalemia [E87.5] 11/01/2022 Yes       Future Appointments   Date Time Provider Department Center   11/7/2022 10:00 AM [facility] CT2 LIMIT 500 LBS [facility] CT SCAN [facility] [place]   11/8/2022 10:20 AM Yee Diaz MD [facility] HEM ONC [facility] [place]   11/9/2022 10:30 AM LAB, HEMONC CANCER BLDG NOM LAB HO [facility]   11/9/2022 11:30 AM Ben Rivera MD [facility] HEMONC3 [facility]   11/16/2022  8:00 AM [facility]   12/8/2022 11:00 AM Fanny LEONARDO  TED [place] [facility] ENDODIA [facility]   12/19/2022  7:30 AM Leila Scott NP [facility] ARRHYTH [facility]   12/19/2022  8:00 AM COORDINATED DEVICE CHECK [facility] ARRHPRO [facility]   12/20/2022  3:00 PM Rita Witt MD [facility] CARDIO [facility]   1/5/2023  1:00 PM Kandis Brooks MD [facility] ENDSUR [facility]      Follow-up Information       Sowmya Mccain MD Follow up in 1 week(s).    Specialties: Internal Medicine, Pediatrics  Contact information:  [address]  [place] [id number] [phone]                               I have seen and examined this patient face to face today. My clinical findings that support the need for the home health skilled services and home bound status are the following:  Weakness/numbness causing balance and gait disturbance due to Weakness/Debility making it taxing to leave home.    Allergies:  Review of patient's allergies indicates:   Allergen Reactions    Coreg [carvedilol] Other (See Comments)     Nausea/vomiting    Allopurinol      Other reaction(s): abnormal transaminases       Diet: renal diet    Activities: activity as tolerated    Nursing:   SN to complete comprehensive assessment including routine vital signs. Instruct on disease process and s/s of complications to report to MD. Review/verify medication list sent home with the patient at time of discharge  and instruct patient/caregiver as needed. Frequency may be adjusted depending on start of care date.    Notify MD if SBP > 160 or < 90; DBP > 90 or < 50; HR > 120 or < 50; Temp > 101      CONSULTS:    Physical Therapy to evaluate and treat. Evaluate for home safety and equipment needs; Establish/upgrade home exercise program. Perform / instruct on therapeutic exercises, gait training, transfer training, and Range of Motion.  Occupational Therapy to evaluate and treat. Evaluate home environment for safety and equipment needs. Perform/Instruct on transfers, ADL training, ROM, and therapeutic exercises.  Aide to provide assistance  with personal care, ADLs, and vital signs.      Medications: Review discharge medications with patient and family and provide education.      Current Discharge Medication List        CONTINUE these medications which have CHANGED    Details   sevelamer carbonate (RENVELA) 800 mg Tab Take 2 tablets (1,600 mg total) by mouth 3 (three) times daily with meals.  Qty: 180 tablet, Refills: 11    Associated Diagnoses: Hyperphosphatemia           CONTINUE these medications which have NOT CHANGED    Details   acetaminophen (TYLENOL) 500 MG tablet Take 500 mg by mouth every 6 (six) hours as needed for Pain.      apixaban (ELIQUIS) 2.5 mg Tab Take 1 tablet (2.5 mg total) by mouth 2 (two) times daily.  Qty: 60 tablet, Refills: 11    Associated Diagnoses: PAF (paroxysmal atrial fibrillation)      CABOMETYX 60 mg Tab TAKE ONE TABLET BY MOUTH ONCE DAILY AT THE SAME TIME ON AN EMPTY STOMACH AT LEAST 1 HOUR BEFORE OR 2 HOURS AFTER EATING. AVOID GRAPEFRUIT PRODUCTS  Qty: 30 tablet, Refills: 4    Associated Diagnoses: Metastatic renal cell carcinoma, unspecified laterality      cloNIDine 0.3 mg/24 hr td ptwk (CATAPRES) 0.3 mg/24 hr Place 1 patch onto the skin every 7 days.  Qty: 4 patch, Refills: 11    Comments: .  Associated Diagnoses: Essential hypertension      epoetin david-epbx (RETACRIT INJ) Epoetin david - epbx (Retacrit)      hydrALAZINE (APRESOLINE) 100 MG tablet Take 1 tablet (100 mg total) by mouth every 12 (twelve) hours.  Qty: 180 tablet, Refills: 2      lidocaine-prilocaine (EMLA) cream APPLY ATLEAST 30 MINUTES BEFORE TREATMENT 3 TIMES A WEEK  Qty: 30 g, Refills: 11    Associated Diagnoses: Hypertensive CKD, ESRD on dialysis      metoprolol succinate (TOPROL-XL) 100 MG 24 hr tablet Take 1 tablet (100 mg total) by mouth once daily.  Qty: 90 tablet, Refills: 0    Comments: .  Associated Diagnoses: Hypertensive CKD, ESRD on dialysis      mv,Ca,min-folic acid-vit K1 (ONE-A-DAY WOMEN'S 50 PLUS) 400-20 mcg Tab Take 1 tablet by  mouth.      ondansetron (ZOFRAN-ODT) 8 MG TbDL Take 1 tablet (8 mg total) by mouth every 12 (twelve) hours as needed.  Qty: 30 tablet, Refills: 1    Associated Diagnoses: Metastatic renal cell carcinoma, unspecified laterality; Metastatic renal cell carcinoma to intra-abdominal site; ESRD (end stage renal disease) on dialysis; Metastatic renal cell carcinoma, left      oxyCODONE-acetaminophen (PERCOCET) 5-325 mg per tablet Take 1 tablet by mouth every 6 (six) hours as needed for Pain.  Qty: 30 each, Refills: 0    Comments: Quantity prescribed more than 7 day supply? Yes, quantity medically necessary  Associated Diagnoses: Metastatic renal cell carcinoma, unspecified laterality; Metastatic renal cell carcinoma to intra-abdominal site; ESRD (end stage renal disease) on dialysis; Metastatic renal cell carcinoma, left      promethazine (PHENERGAN) 25 MG tablet Take 1 tablet (25 mg total) by mouth every 6 (six) hours as needed for Nausea.  Qty: 15 tablet, Refills: 0      sacubitriL-valsartan (ENTRESTO) 49-51 mg per tablet Take 1 tablet by mouth 2 (two) times daily.  Qty: 60 tablet, Refills: 11    Associated Diagnoses: Cardiomyopathy, nonischemic; Combined systolic and diastolic cardiac dysfunction      sodium chloride 0.9% SolP 100 mL with iron sucrose 100 mg iron/5 mL Soln 100 mg 50 mg.           STOP taking these medications       predniSONE (DELTASONE) 20 MG tablet Comments:   Reason for Stopping:               I certify that this patient is confined to her home and needs intermittent skilled nursing care, physical therapy, and occupational therapy.

## 2022-11-06 NOTE — PLAN OF CARE
[place] [facility] Surg    HOME HEALTH ORDERS  FACE TO FACE ENCOUNTER    Patient Name: Eva Bloom  YOB: 1965    PCP: Sowmya Mccain MD   PCP Address: [facility] / [address] [address]  PCP Phone Number: [phone]  PCP Fax: [phone]       Encounter Date: 11/06/2022    Admit to Home Health    Diagnoses:  Active Hospital Problems    Diagnosis  POA    Debility [R53.81]  Yes     Priority: 2     Transaminitis [R74.01]  Yes    PAF (paroxysmal atrial fibrillation) [I48.0]  Yes    ICD (implantable cardioverter-defibrillator) in place - SubQ [Z95.810]  Yes    Anemia in ESRD (end-stage renal disease) [N18.6, D63.1]  Yes    Chronic kidney disease-mineral and bone disorder [N18.9, E83.9, M89.9]  Yes    Chronic combined systolic and diastolic congestive heart failure [I50.42]  Yes     - s/p ICD      Nonrheumatic mitral valve regurgitation [I34.0]  Yes    ESRD (end stage renal disease) on dialysis [N18.6, Z99.2]  Not Applicable     - Tuesday, Thursday, and Saturday  - Chelsie Hart, nephrology      Cardiomyopathy, nonischemic [I42.8]  Yes    Pulmonary hypertension [I27.20]  Yes    S/p nephrectomy left [Z90.5]  Not Applicable     Chronic    History of kidney cancer [Z85.528]  Not Applicable    Essential hypertension [I10]  Yes      Resolved Hospital Problems    Diagnosis Date Resolved POA    *Sepsis [A41.9] 11/06/2022 Yes     Priority: 1 - High    Lactic acidosis [E87.20] 11/01/2022 Yes    Elevated troponin [R77.8] 11/06/2022 Yes    Hyperkalemia [E87.5] 11/01/2022 Yes       Future Appointments   Date Time Provider Department Center   11/7/2022 10:00 AM [facility] CT2 LIMIT 500 LBS [facility] CT SCAN [facility]   11/8/2022 10:20 AM Yee Diaz MD [facility] HEM ONC [facility]   11/9/2022 10:30 AM LAB, HEMONC CANCER BLDG NOM LAB HO Hubert Lucio   11/9/2022 11:30 AM Ben Rivera MD [facility] HEMONC3 Hubert Cheekyeni   11/16/2022  8:00 AM JANKI Dixon   12/8/2022 11:00 AM Fanny LEONARDO  TED [place] [facility] ENDODIA [facility]   12/19/2022  7:30 AM Leila Scott NP [facility] ARRHYTH [facility]   12/19/2022  8:00 AM COORDINATED DEVICE CHECK [facility] ARRHPRO [facility]   12/20/2022  3:00 PM Rita Witt MD [facility] CARDIO [facility]   1/5/2023  1:00 PM Kandis Brooks MD [facility] ENDSUR [facility]      Follow-up Information       Sowmya Mccain MD Follow up in 1 week(s).    Specialties: Internal Medicine, Pediatrics  Contact information:  [address]  [place] [id number] [phone]                               I have seen and examined this patient face to face today. My clinical findings that support the need for the home health skilled services and home bound status are the following:  Weakness/numbness causing balance and gait disturbance due to Weakness/Debility making it taxing to leave home.    Allergies:  Review of patient's allergies indicates:   Allergen Reactions    Coreg [carvedilol] Other (See Comments)     Nausea/vomiting    Allopurinol      Other reaction(s): abnormal transaminases       Diet: renal diet    Activities: activity as tolerated    Nursing:   SN to complete comprehensive assessment including routine vital signs. Instruct on disease process and s/s of complications to report to MD. Review/verify medication list sent home with the patient at time of discharge  and instruct patient/caregiver as needed. Frequency may be adjusted depending on start of care date.    Notify MD if SBP > 160 or < 90; DBP > 90 or < 50; HR > 120 or < 50; Temp > 101      CONSULTS:    Physical Therapy to evaluate and treat. Evaluate for home safety and equipment needs; Establish/upgrade home exercise program. Perform / instruct on therapeutic exercises, gait training, transfer training, and Range of Motion.  Occupational Therapy to evaluate and treat. Evaluate home environment for safety and equipment needs. Perform/Instruct on transfers, ADL training, ROM, and therapeutic exercises.  Aide to provide assistance  with personal care, ADLs, and vital signs.      Medications: Review discharge medications with patient and family and provide education.      Current Discharge Medication List        CONTINUE these medications which have CHANGED    Details   sevelamer carbonate (RENVELA) 800 mg Tab Take 2 tablets (1,600 mg total) by mouth 3 (three) times daily with meals.  Qty: 180 tablet, Refills: 11    Associated Diagnoses: Hyperphosphatemia           CONTINUE these medications which have NOT CHANGED    Details   acetaminophen (TYLENOL) 500 MG tablet Take 500 mg by mouth every 6 (six) hours as needed for Pain.      apixaban (ELIQUIS) 2.5 mg Tab Take 1 tablet (2.5 mg total) by mouth 2 (two) times daily.  Qty: 60 tablet, Refills: 11    Associated Diagnoses: PAF (paroxysmal atrial fibrillation)      CABOMETYX 60 mg Tab TAKE ONE TABLET BY MOUTH ONCE DAILY AT THE SAME TIME ON AN EMPTY STOMACH AT LEAST 1 HOUR BEFORE OR 2 HOURS AFTER EATING. AVOID GRAPEFRUIT PRODUCTS  Qty: 30 tablet, Refills: 4    Associated Diagnoses: Metastatic renal cell carcinoma, unspecified laterality      cloNIDine 0.3 mg/24 hr td ptwk (CATAPRES) 0.3 mg/24 hr Place 1 patch onto the skin every 7 days.  Qty: 4 patch, Refills: 11    Comments: .  Associated Diagnoses: Essential hypertension      epoetin david-epbx (RETACRIT INJ) Epoetin david - epbx (Retacrit)      hydrALAZINE (APRESOLINE) 100 MG tablet Take 1 tablet (100 mg total) by mouth every 12 (twelve) hours.  Qty: 180 tablet, Refills: 2      lidocaine-prilocaine (EMLA) cream APPLY ATLEAST 30 MINUTES BEFORE TREATMENT 3 TIMES A WEEK  Qty: 30 g, Refills: 11    Associated Diagnoses: Hypertensive CKD, ESRD on dialysis      metoprolol succinate (TOPROL-XL) 100 MG 24 hr tablet Take 1 tablet (100 mg total) by mouth once daily.  Qty: 90 tablet, Refills: 0    Comments: .  Associated Diagnoses: Hypertensive CKD, ESRD on dialysis      mv,Ca,min-folic acid-vit K1 (ONE-A-DAY WOMEN'S 50 PLUS) 400-20 mcg Tab Take 1 tablet by  mouth.      ondansetron (ZOFRAN-ODT) 8 MG TbDL Take 1 tablet (8 mg total) by mouth every 12 (twelve) hours as needed.  Qty: 30 tablet, Refills: 1    Associated Diagnoses: Metastatic renal cell carcinoma, unspecified laterality; Metastatic renal cell carcinoma to intra-abdominal site; ESRD (end stage renal disease) on dialysis; Metastatic renal cell carcinoma, left      oxyCODONE-acetaminophen (PERCOCET) 5-325 mg per tablet Take 1 tablet by mouth every 6 (six) hours as needed for Pain.  Qty: 30 each, Refills: 0    Comments: Quantity prescribed more than 7 day supply? Yes, quantity medically necessary  Associated Diagnoses: Metastatic renal cell carcinoma, unspecified laterality; Metastatic renal cell carcinoma to intra-abdominal site; ESRD (end stage renal disease) on dialysis; Metastatic renal cell carcinoma, left      promethazine (PHENERGAN) 25 MG tablet Take 1 tablet (25 mg total) by mouth every 6 (six) hours as needed for Nausea.  Qty: 15 tablet, Refills: 0      sacubitriL-valsartan (ENTRESTO) 49-51 mg per tablet Take 1 tablet by mouth 2 (two) times daily.  Qty: 60 tablet, Refills: 11    Associated Diagnoses: Cardiomyopathy, nonischemic; Combined systolic and diastolic cardiac dysfunction      sodium chloride 0.9% SolP 100 mL with iron sucrose 100 mg iron/5 mL Soln 100 mg 50 mg.           STOP taking these medications       predniSONE (DELTASONE) 20 MG tablet Comments:   Reason for Stopping:               I certify that this patient is confined to her home and needs intermittent skilled nursing care.

## 2022-11-06 NOTE — PT/OT/SLP PROGRESS
Physical Therapy      Patient Name:  Eva Bloom   MRN:  [id number]    Patient not seen today secondary to Patient unwilling to participate. Will follow-up tomorrow.

## 2022-11-06 NOTE — DISCHARGE SUMMARY
[facility] Medicine  Discharge Summary      Patient Name: Eva Bloom  MRN: [id number]  [facility]: [id number]  Patient Class: IP- Inpatient  Admission Date: 10/29/2022  Hospital Length of Stay: 8 days  Discharge Date and Time:  11/06/2022 9:20 AM  Attending Physician: Rolf Recinos MD   Discharging Provider: Rolf Recinos MD  Primary Care Provider: Sowmya Mccain MD    Primary Care Team: Networked reference to record PCT     HPI:   57 year old female, with pertinent medical history of anemia, kidney cancer, CHF with EF of 35%,, HTN, Pafib on OAC,prediabetes, and myocardiopathy, presents to the ED to evaluate her worsening bilateral leg pain and weakness for 5 days. Patient's  states she was seen for the same symptoms 5 days ago as well as sore throat, chest pain, and nausea. She had labwork done and her results were all negative. Sore throat, chest pain, and nausea have resolved. Patient woke up this morning very weak and fatigued. She was late for dialysis and when she arrived, she was advised to come to the ED. Patient received her dialysis 2 days ago as normal. Patient was able to ambulate alone yesterday with pain, but needs support today.per ER record,  reports she has decreased appetite and is no longer producing urine. No pain medications taken today.  notes that a week ago, patient attended an event and possibly had sick contacts. Patient denies congestion, cough, trauma to legs,or other associated symptoms,patient is hypothermic in ER 93%,has elevated lactic acid,more than 12,has leucocytosis 14,patient is septic,can not gave IVF duo to  ESRD,has been  started on broad spectrum IV Abx.he has severe acidosis C O 2 of 8 and elevated Troponin 2.8,she denies chest apian dn abdominal pain,only complain is leg pain.she has also Hyperkalemia 6.0.      * No surgery found *      Hospital Course:   57 year old female with ESRD presents to the ED to evaluate her worsening  bilateral leg pain and weakness for 5 days.  Patient was hypothermic in ER with lactic acidosis.  Started on Bicarb drip and underwent HD.  Started on broad spectrum ABx's.  Elevated Troponin and Cardiology consulted.  Echo shows EF of 25%.  Denies any chest pain and no plans for intervention at this time.  US abdomen shows possible acute cholecystitis.  Surgery consulted.  HIDA unable to eval gallbladder due to poor hepatic clearance. No indication for cholecystostomy drain as pain has improved per Surgery.  PT/OT consulted.  Recommending Rehab.  SW consulted.  All cultures negative with no obvious source of infection and ABx's stopped.  Patient and  do not want Rehab and want to go home with HH.  She has remained afebrile and hemodynamically stable.  Elevated LFT's on admit have gradually improved.  Patient will be discharged home with HH and DME.  She will follow up with PCP.       Goals of Care Treatment Preferences:  Code Status: Full Code          What is most important right now is to focus on symptom/pain control, extending life as long as possible, even it it means sacrificing quality, curative/life-prolongation (regardless of treatment burdens).  Accordingly, we have decided that the best plan to meet the patient's goals includes continuing with treatment.      Consults:   Consults (From admission, onward)        Status Ordering Provider     Inpatient consult to Social Work  Once        Provider:  (Not yet assigned)    Completed FIDEL CONROY     Inpatient consult to Gastroenterology  Once        Provider:  Gopal Huerta MD    Completed JALEESA ROLDAN     Inpatient consult to Palliative Care  Once        Provider:  Leila Munoz NP    Completed JALEESA ROLDAN     Inpatient consult to Spiritual Care  Once        Provider:  (Not yet assigned)    Completed JALEESA ROLDAN     Inpatient consult to General Surgery  Once        Provider:  Easton Thomason MD     Completed MANPREET BARAJAS     Inpatient consult to Cardiology  Once        Provider:  Shayne Walker MD    Completed JALEESA ROLDAN     Inpatient consult to Nephrology  Once        Provider:  Joy Schneider MD    Completed JALEESA ROLDAN          No new Assessment & Plan notes have been filed under this hospital service since the last note was generated.  Service: Hospital Medicine    Final Active Diagnoses:    Diagnosis Date Noted POA    Debility [R53.81] 11/04/2022 Yes    Transaminitis [R74.01] 10/29/2022 Yes    PAF (paroxysmal atrial fibrillation) [I48.0] 07/25/2022 Yes    ICD (implantable cardioverter-defibrillator) in place - SubQ [Z95.810] 07/15/2021 Yes    Anemia in ESRD (end-stage renal disease) [N18.6, D63.1]  Yes    Chronic kidney disease-mineral and bone disorder [N18.9, E83.9, M89.9]  Yes    Chronic combined systolic and diastolic congestive heart failure [I50.42]  Yes    Nonrheumatic mitral valve regurgitation [I34.0] 06/01/2020 Yes    ESRD (end stage renal disease) on dialysis [N18.6, Z99.2]  Not Applicable    Cardiomyopathy, nonischemic [I42.8] 07/21/2017 Yes    Pulmonary hypertension [I27.20] 07/21/2017 Yes    S/p nephrectomy left [Z90.5] 10/07/2016 Not Applicable     Chronic    History of kidney cancer [Z85.528] 09/02/2016 Not Applicable    Essential hypertension [I10] 09/23/2015 Yes      Problems Resolved During this Admission:    Diagnosis Date Noted Date Resolved POA    PRINCIPAL PROBLEM:  Sepsis [A41.9] 10/29/2022 11/06/2022 Yes    Lactic acidosis [E87.20] 10/29/2022 11/01/2022 Yes    Elevated troponin [R77.8] 10/29/2022 11/06/2022 Yes    Hyperkalemia [E87.5]  11/01/2022 Yes       Discharged Condition: stable    Disposition: Home-Health Care Svc    Follow Up:   Follow-up Information     Sowmya Mccain MD Follow up in 1 week(s).    Specialties: Internal Medicine, Pediatrics  Contact information:  [address]  [phone]              "          Patient Instructions:      HOSPITAL BED FOR HOME USE     Order Specific Question Answer Comments   Type: Semi-electric    Length of need (1-99 months): 99    Does patient have medical equipment at home? none    Height: 5' 4" (1.626 m)    Weight: 50 kg (110 lb 3.7 oz)    Please check all that apply: Patient requires positioning of the body in ways not feasible in an ordinary bed due to a medical condition which is expected to last at least one month.      COMMODE FOR HOME USE     Order Specific Question Answer Comments   Type: Standard    Height: 5' 4" (1.626 m)    Weight: 50 kg (110 lb 3.7 oz)    Does patient have medical equipment at home? none    Length of need (1-99 months): 99      WALKER FOR HOME USE     Order Specific Question Answer Comments   Type of Walker: Adult (5'4"-6'6")    With wheels? Yes    Height: 5' 4" (1.626 m)    Weight: 50 kg (110 lb 3.7 oz)    Length of need (1-99 months): 99    Does patient have medical equipment at home? none    Please check all that apply: Patient is unable to safely ambulate without equipment.      Ambulatory referral/consult to [facility]   Standing Status: Future   Referral Priority: Routine Referral Type: Consultation   Referral Reason: Specialty Services Required   Number of Visits Requested: 1     Diet renal     Notify your health care provider if you experience any of the following:  temperature >100.4     Notify your health care provider if you experience any of the following:  persistent nausea and vomiting or diarrhea     Notify your health care provider if you experience any of the following:  severe uncontrolled pain     Notify your health care provider if you experience any of the following:  difficulty breathing or increased cough     Notify your health care provider if you experience any of the following:  persistent dizziness, light-headedness, or visual disturbances     Notify your health care provider if you experience any of the " following:  increased confusion or weakness     Activity as tolerated         Pending Diagnostic Studies:     None         Medications:  Reconciled Home Medications:      Medication List      CHANGE how you take these medications    oxyCODONE-acetaminophen 5-325 mg per tablet  Commonly known as: PERCOCET  Take 1 tablet by mouth every 6 (six) hours as needed for Pain.  What changed: Another medication with the same name was removed. Continue taking this medication, and follow the directions you see here.        CONTINUE taking these medications    acetaminophen 500 MG tablet  Commonly known as: TYLENOL  Take 500 mg by mouth every 6 (six) hours as needed for Pain.     CABOMETYX 60 mg Tab  Generic drug: cabozantinib  TAKE ONE TABLET BY MOUTH ONCE DAILY AT THE SAME TIME ON AN EMPTY STOMACH AT LEAST 1 HOUR BEFORE OR 2 HOURS AFTER EATING. AVOID GRAPEFRUIT PRODUCTS     cloNIDine 0.3 mg/24 hr td ptwk 0.3 mg/24 hr  Commonly known as: CATAPRES  Place 1 patch onto the skin every 7 days.     ELIQUIS 2.5 mg Tab  Generic drug: apixaban  Take 1 tablet (2.5 mg total) by mouth 2 (two) times daily.     ENTRESTO 49-51 mg per tablet  Generic drug: sacubitriL-valsartan  Take 1 tablet by mouth 2 (two) times daily.     hydrALAZINE 100 MG tablet  Commonly known as: APRESOLINE  Take 1 tablet (100 mg total) by mouth every 12 (twelve) hours.     LIDOcaine-prilocaine cream  Commonly known as: EMLA  APPLY ATLEAST 30 MINUTES BEFORE TREATMENT 3 TIMES A WEEK     metoprolol succinate 100 MG 24 hr tablet  Commonly known as: TOPROL-XL  Take 1 tablet (100 mg total) by mouth once daily.     ondansetron 8 MG Tbdl  Commonly known as: ZOFRAN-ODT  Take 1 tablet (8 mg total) by mouth every 12 (twelve) hours as needed.     ONE-A-DAY WOMEN'S 50 PLUS 400-20 mcg Tab  Generic drug: mv,Ca,min-folic acid-vit K1  Take 1 tablet by mouth.     promethazine 25 MG tablet  Commonly known as: PHENERGAN  Take 1 tablet (25 mg total) by mouth every 6 (six) hours as needed  for Nausea.     RETACRIT INJ  Epoetin david - epbx (Retacrit)     sevelamer carbonate 800 mg Tab  Commonly known as: RENVELA  Take 2 tablets (1,600 mg total) by mouth 3 (three) times daily with meals.     sodium chloride 0.9% SolP 100 mL with iron sucrose 100 mg iron/5 mL Soln 100 mg  50 mg.        STOP taking these medications    predniSONE 20 MG tablet  Commonly known as: DELTASONE            Indwelling Lines/Drains at time of discharge:   Lines/Drains/Airways     Central Venous Catheter Line  Duration                      Drain  Duration                Hemodialysis AV Fistula Left upper arm -- days                Time spent on the discharge of patient: >30 minutes         Rolf Recinos MD  Department of Hospital Medicine  [facility] Surg

## 2022-11-06 NOTE — NURSING
Pt given written and verbal discharge instructions. Pt verbalized understanding and follow up appts were fully explained. Pt IV access removed, and TLC removed by RN. Nad or pain noted, dressing applied. Pt and family currently waiting for transport to arrive.

## 2022-11-06 NOTE — PROGRESS NOTES
Awake alert oriented NAD     at bedside, case discussed with him. All questions answered to satisfaction     Some soreness in her abd still not very localized     ROS Denies cns ent cp gu rheum sx     Past Medical History:   Diagnosis Date    Anemia     Bronchitis 03/01/2017    Cancer 2016    kidney cancer    CHF (congestive heart failure), NYHA class II, chronic, systolic     CMV (cytomegalovirus) antibody positive     Essential hypertension 9/23/2015    H/O herpes simplex type 2 infection     Herpes simplex type 1 antibody positive     History of kidney cancer     s/p left nephrectomy 1/2016    Hyperparathyroidism, unspecified     Hyperuricemia without signs of inflammatory arthritis and tophaceous disease     Kidney stones     LGSIL (low grade squamous intraepithelial dysplasia)     Myocardiopathy 7/21/2017    Prediabetes     Proteinuria     Renal disorder     Thyroid nodule     Urate nephropathy      Past Surgical History:   Procedure Laterality Date    BREAST CYST EXCISION      COLONOSCOPY N/A 11/12/2015    COLONOSCOPY N/A 3/12/2021    Procedure: COLONOSCOPY;  Surgeon: Brendon Lanier MD;  Location: [facility];  Service: Endoscopy;  Laterality: N/A;  covid test 3/9, labs prior, prep instr mailed -ml    INSERTION OF BIVENTRICULAR IMPLANTABLE CARDIOVERTER-DEFIBRILLATOR (ICD)  04/2021    NEPHRECTOMY-LAPAROSCOPIC Left 01/12/2016    PERITONEAL CATHETER INSERTION      Permacath insertion  01/12/2017    SALPINGOOPHORECTOMY Right 2016    KJB---DAVINCI    TONSILLECTOMY      TUBAL LIGATION       Review of patient's allergies indicates:   Allergen Reactions    Coreg [carvedilol] Other (See Comments)     Nausea/vomiting    Allopurinol      Other reaction(s): abnormal transaminases       Current Facility-Administered Medications   Medication    0.9%  NaCl infusion    0.9%  NaCl infusion    acetaminophen tablet 650 mg    amLODIPine tablet 10 mg    apixaban tablet 2.5 mg    dextrose 10% bolus 250 mL    epoetin  david-epbx injection 2,520 Units    hydrALAZINE injection 20 mg    hydrALAZINE tablet 100 mg    labetaloL injection 10 mg    loperamide capsule 2 mg    metoprolol succinate (TOPROL-XL) 24 hr tablet 100 mg    ondansetron injection 8 mg    polyethylene glycol packet 17 g    rifAXIMin tablet 550 mg    sacubitriL-valsartan 24-26 mg per tablet 2 tablet    sodium chloride 0.9% bolus 250 mL       LABS    Recent Results (from the past 24 hour(s))   CBC auto differential    Collection Time: 11/06/22  5:43 AM   Result Value Ref Range    WBC 9.93 3.90 - 12.70 K/uL    RBC 2.32 (L) 4.00 - 5.40 M/uL    Hemoglobin 7.7 (L) 12.0 - 16.0 g/dL    Hematocrit 23.6 (L) 37.0 - 48.5 %     (H) 82 - 98 fL    MCH 33.2 (H) 27.0 - 31.0 pg    MCHC 32.6 32.0 - 36.0 g/dL    RDW 23.3 (H) 11.5 - 14.5 %    Platelets 108 (L) 150 - 450 K/uL    MPV 11.9 9.2 - 12.9 fL    Immature Granulocytes 0.7 (H) 0.0 - 0.5 %    Gran # (ANC) 8.0 (H) 1.8 - 7.7 K/uL    Immature Grans (Abs) 0.07 (H) 0.00 - 0.04 K/uL    Lymph # 0.7 (L) 1.0 - 4.8 K/uL    Mono # 0.9 0.3 - 1.0 K/uL    Eos # 0.2 0.0 - 0.5 K/uL    Baso # 0.02 0.00 - 0.20 K/uL    nRBC 0 0 /100 WBC    Gran % 80.4 (H) 38.0 - 73.0 %    Lymph % 7.4 (L) 18.0 - 48.0 %    Mono % 9.2 4.0 - 15.0 %    Eosinophil % 2.1 0.0 - 8.0 %    Basophil % 0.2 0.0 - 1.9 %    Differential Method Automated    Comprehensive metabolic panel    Collection Time: 11/06/22  5:43 AM   Result Value Ref Range    Sodium 141 136 - 145 mmol/L    Potassium 3.4 (L) 3.5 - 5.1 mmol/L    Chloride 109 95 - 110 mmol/L    CO2 21 (L) 23 - 29 mmol/L    Glucose 89 70 - 110 mg/dL    BUN 34 (H) 6 - 20 mg/dL    Creatinine 4.2 (H) 0.5 - 1.4 mg/dL    Calcium 9.1 8.7 - 10.5 mg/dL    Total Protein 5.5 (L) 6.0 - 8.4 g/dL    Albumin 1.7 (L) 3.5 - 5.2 g/dL    Total Bilirubin 2.3 (H) 0.1 - 1.0 mg/dL    Alkaline Phosphatase 430 (H) 55 - 135 U/L     (H) 10 - 40 U/L     (H) 10 - 44 U/L    Anion Gap 11 8 - 16 mmol/L    eGFR 12 (A) >60 mL/min/1.73 m^2    POCT glucose    Collection Time: 11/06/22  8:35 AM   Result Value Ref Range    POCT Glucose 142 (H) 70 - 110 mg/dL   ]    I/O last 3 completed shifts:  In: 720 [P.O.:720]  Out: -     Vitals:    11/06/22 0426 11/06/22 0544 11/06/22 0736 11/06/22 1117   BP: 127/72 137/67 (!) 152/70 (!) 140/68   Pulse: 76 72 79 76   Resp: 18  16 18   Temp: 99.1 °F (37.3 °C)  97.5 °F (36.4 °C) 97.7 °F (36.5 °C)   TempSrc: Oral Oral Oral Oral   SpO2: 97% 100% 97% 96%   Weight:       Height:           No Jvd, Thyromegaly or Lymphadenopathy  Lungs: Fairly clear anteriorly and laterally but few diffuse faint lat rhales   Cor: RRR no G or rubs  Abd: Soft benign good bowel sounds non tender  Ext: No E C C    A)    ESRD on HD TTS   Sepsis, ? GB infection pain better no surgical intervention planned   CHF/CMP  Pulm htn   Anemia  2nd hyperpth  PAF  Lactic acidosis  HX of RCC sp nephrectomy     P)    HD TIW TTS   Epo prn  Nutritional support  Binders when able   Antibiotx as needed adjusted to renal fx       Reconsider entresto     Px guarded

## 2022-11-07 ENCOUNTER — PATIENT MESSAGE (OUTPATIENT)
Dept: ADMINISTRATIVE | Facility: CLINIC | Age: 57
End: 2022-11-07
Payer: MEDICARE

## 2022-11-07 ENCOUNTER — PATIENT OUTREACH (OUTPATIENT)
Dept: ADMINISTRATIVE | Facility: CLINIC | Age: 57
End: 2022-11-07
Payer: MEDICARE

## 2022-11-07 DIAGNOSIS — A41.9 SEPSIS, DUE TO UNSPECIFIED ORGANISM, UNSPECIFIED WHETHER ACUTE ORGAN DYSFUNCTION PRESENT: ICD-10-CM

## 2022-11-07 PROCEDURE — G0180 MD CERTIFICATION HHA PATIENT: HCPCS | Mod: ,,, | Performed by: HOSPITALIST

## 2022-11-07 PROCEDURE — G0180 PR HOME HEALTH MD CERTIFICATION: ICD-10-PCS | Mod: ,,, | Performed by: HOSPITALIST

## 2022-11-07 NOTE — PROGRESS NOTES
C3 nurse attempted to contact Eva Bloom for a TCC post hospital discharge follow up call. No answer. Left voicemail with callback information. The patient has a scheduled HOSFU appointment with Kyrie Lepe MD on 11/18/22 @ 9am.     WIll request an earlier appt within 7 days of discharge per routing

## 2022-11-07 NOTE — PROGRESS NOTES
C3 nurse attempted to contact Eva Bloom for a TCC post hospital discharge follow up call. The patient is unable to conduct the call @ this time. The patient requested a callback.    The patient has a scheduled HOSFU appointment with Kyrie Lepe on 11/18/22 @ 9am. Message sent to Physician staff.

## 2022-11-08 NOTE — PROGRESS NOTES
C3 nurse spoke with Eva Bloom for a TCC post hospital discharge follow up call. The patient has a scheduled HOSFU appointment with NP Referral placed for home care for earlier appt from discharge on 11/6/22

## 2022-11-16 ENCOUNTER — HOSPITAL ENCOUNTER (EMERGENCY)
Facility: HOSPITAL | Age: 57
Discharge: HOME | End: 2022-11-16
Attending: EMERGENCY MEDICINE
Payer: MEDICARE

## 2022-11-16 ENCOUNTER — HOSPITAL ENCOUNTER (OUTPATIENT)
Dept: RADIOLOGY | Facility: HOSPITAL | Age: 57
Discharge: HOME | End: 2022-11-16
Attending: INTERNAL MEDICINE
Payer: MEDICARE

## 2022-11-16 VITALS
HEIGHT: 64 IN | SYSTOLIC BLOOD PRESSURE: 164 MMHG | BODY MASS INDEX: 19.63 KG/M2 | HEART RATE: 71 BPM | WEIGHT: 115 LBS | DIASTOLIC BLOOD PRESSURE: 81 MMHG | OXYGEN SATURATION: 98 % | RESPIRATION RATE: 20 BRPM | TEMPERATURE: 98 F

## 2022-11-16 DIAGNOSIS — R53.1 WEAKNESS: ICD-10-CM

## 2022-11-16 DIAGNOSIS — K92.2 LOWER GI BLEED: ICD-10-CM

## 2022-11-16 DIAGNOSIS — Z12.31 SCREENING MAMMOGRAM FOR BREAST CANCER: ICD-10-CM

## 2022-11-16 DIAGNOSIS — D64.9 ANEMIA, UNSPECIFIED TYPE: Primary | ICD-10-CM

## 2022-11-16 LAB
ABO + RH BLD: NORMAL
ALBUMIN SERPL BCP-MCNC: 1.8 G/DL (ref 3.5–5.2)
ALP SERPL-CCNC: 609 U/L (ref 55–135)
ALT SERPL W/O P-5'-P-CCNC: 65 U/L (ref 10–44)
ANION GAP SERPL CALC-SCNC: 18 MMOL/L (ref 8–16)
AST SERPL-CCNC: 70 U/L (ref 10–40)
BASOPHILS # BLD AUTO: 0.07 K/UL (ref 0–0.2)
BASOPHILS NFR BLD: 0.7 % (ref 0–1.9)
BILIRUB SERPL-MCNC: 1.2 MG/DL (ref 0.1–1)
BLD GP AB SCN CELLS X3 SERPL QL: NORMAL
BNP SERPL-MCNC: >4900 PG/ML (ref 0–99)
BUN SERPL-MCNC: 74 MG/DL (ref 6–20)
CALCIUM SERPL-MCNC: 9.4 MG/DL (ref 8.7–10.5)
CHLORIDE SERPL-SCNC: 104 MMOL/L (ref 95–110)
CO2 SERPL-SCNC: 21 MMOL/L (ref 23–29)
CREAT SERPL-MCNC: 5.3 MG/DL (ref 0.5–1.4)
DIFFERENTIAL METHOD: ABNORMAL
EOSINOPHIL # BLD AUTO: 0.4 K/UL (ref 0–0.5)
EOSINOPHIL NFR BLD: 4.6 % (ref 0–8)
ERYTHROCYTE [DISTWIDTH] IN BLOOD BY AUTOMATED COUNT: 20.8 % (ref 11.5–14.5)
EST. GFR  (NO RACE VARIABLE): 9 ML/MIN/1.73 M^2
GLUCOSE SERPL-MCNC: 118 MG/DL (ref 70–110)
HCT VFR BLD AUTO: 25.6 % (ref 37–48.5)
HGB BLD-MCNC: 7.6 G/DL (ref 12–16)
IMM GRANULOCYTES # BLD AUTO: 0.07 K/UL (ref 0–0.04)
IMM GRANULOCYTES NFR BLD AUTO: 0.7 % (ref 0–0.5)
INR PPP: 1.1 (ref 0.8–1.2)
IRON SERPL-MCNC: 58 UG/DL (ref 30–160)
LYMPHOCYTES # BLD AUTO: 2 K/UL (ref 1–4.8)
LYMPHOCYTES NFR BLD: 20.9 % (ref 18–48)
MAGNESIUM SERPL-MCNC: 2.9 MG/DL (ref 1.6–2.6)
MCH RBC QN AUTO: 32.1 PG (ref 27–31)
MCHC RBC AUTO-ENTMCNC: 29.7 G/DL (ref 32–36)
MCV RBC AUTO: 108 FL (ref 82–98)
MONOCYTES # BLD AUTO: 1 K/UL (ref 0.3–1)
MONOCYTES NFR BLD: 10.1 % (ref 4–15)
NEUTROPHILS # BLD AUTO: 6 K/UL (ref 1.8–7.7)
NEUTROPHILS NFR BLD: 63 % (ref 38–73)
NRBC BLD-RTO: 1 /100 WBC
PHOSPHATE SERPL-MCNC: 4.5 MG/DL (ref 2.7–4.5)
PLATELET # BLD AUTO: 293 K/UL (ref 150–450)
PMV BLD AUTO: 9.9 FL (ref 9.2–12.9)
POTASSIUM SERPL-SCNC: 4.6 MMOL/L (ref 3.5–5.1)
PROT SERPL-MCNC: 6.3 G/DL (ref 6–8.4)
PROTHROMBIN TIME: 11.5 SEC (ref 9–12.5)
RBC # BLD AUTO: 2.37 M/UL (ref 4–5.4)
SATURATED IRON: 21 % (ref 20–50)
SODIUM SERPL-SCNC: 143 MMOL/L (ref 136–145)
TOTAL IRON BINDING CAPACITY: 272 UG/DL (ref 250–450)
TRANSFERRIN SERPL-MCNC: 184 MG/DL (ref 200–375)
TROPONIN I SERPL DL<=0.01 NG/ML-MCNC: 0.36 NG/ML (ref 0–0.03)
WBC # BLD AUTO: 9.52 K/UL (ref 3.9–12.7)

## 2022-11-16 PROCEDURE — 82746 ASSAY OF FOLIC ACID SERUM: CPT | Performed by: EMERGENCY MEDICINE

## 2022-11-16 PROCEDURE — 83880 ASSAY OF NATRIURETIC PEPTIDE: CPT | Performed by: EMERGENCY MEDICINE

## 2022-11-16 PROCEDURE — 93010 EKG 12-LEAD: ICD-10-PCS | Mod: ,,, | Performed by: INTERNAL MEDICINE

## 2022-11-16 PROCEDURE — 77067 MAMMO DIGITAL SCREENING BILAT WITH TOMO: ICD-10-PCS | Mod: 26,,, | Performed by: RADIOLOGY

## 2022-11-16 PROCEDURE — 86850 RBC ANTIBODY SCREEN: CPT | Performed by: EMERGENCY MEDICINE

## 2022-11-16 PROCEDURE — 84484 ASSAY OF TROPONIN QUANT: CPT | Performed by: EMERGENCY MEDICINE

## 2022-11-16 PROCEDURE — 80053 COMPREHEN METABOLIC PANEL: CPT | Performed by: EMERGENCY MEDICINE

## 2022-11-16 PROCEDURE — 85610 PROTHROMBIN TIME: CPT | Performed by: EMERGENCY MEDICINE

## 2022-11-16 PROCEDURE — 93010 ELECTROCARDIOGRAM REPORT: CPT | Mod: ,,, | Performed by: INTERNAL MEDICINE

## 2022-11-16 PROCEDURE — 83735 ASSAY OF MAGNESIUM: CPT | Performed by: EMERGENCY MEDICINE

## 2022-11-16 PROCEDURE — 77063 BREAST TOMOSYNTHESIS BI: CPT | Mod: 26,,, | Performed by: RADIOLOGY

## 2022-11-16 PROCEDURE — 77063 BREAST TOMOSYNTHESIS BI: CPT | Mod: TC,PO

## 2022-11-16 PROCEDURE — 93005 ELECTROCARDIOGRAM TRACING: CPT

## 2022-11-16 PROCEDURE — 77063 MAMMO DIGITAL SCREENING BILAT WITH TOMO: ICD-10-PCS | Mod: 26,,, | Performed by: RADIOLOGY

## 2022-11-16 PROCEDURE — 77067 SCR MAMMO BI INCL CAD: CPT | Mod: 26,,, | Performed by: RADIOLOGY

## 2022-11-16 PROCEDURE — 82607 VITAMIN B-12: CPT | Performed by: EMERGENCY MEDICINE

## 2022-11-16 PROCEDURE — 99284 EMERGENCY DEPT VISIT MOD MDM: CPT | Mod: 25

## 2022-11-16 PROCEDURE — 84466 ASSAY OF TRANSFERRIN: CPT | Performed by: EMERGENCY MEDICINE

## 2022-11-16 PROCEDURE — 84100 ASSAY OF PHOSPHORUS: CPT | Performed by: EMERGENCY MEDICINE

## 2022-11-16 PROCEDURE — 77067 SCR MAMMO BI INCL CAD: CPT | Mod: TC,PO

## 2022-11-16 PROCEDURE — 85025 COMPLETE CBC W/AUTO DIFF WBC: CPT | Performed by: EMERGENCY MEDICINE

## 2022-11-17 LAB
FOLATE SERPL-MCNC: 26.9 NG/ML (ref 4–24)
VIT B12 SERPL-MCNC: 1681 PG/ML (ref 210–950)

## 2022-11-17 NOTE — ED NOTES
Pt to ED with spouse sent from dialysis for low hemoglobin drawn yesterday. Pt reports fatigue, but not worse than normal. Pt also reports small amount of blood noted to brief 2 days ago and none since.    LOC: Pt is awake alert and aware of environment, oriented X3 and speaking appropriately  Appearance: Pt is in no acute distress, Pt is well groomed and clean  Skin: skin is warm and dry with normal turgor, mucus membranes are moist and pink, skin is intact with no bruising or breakdown  Muskuloskeletal: Pt moves all extremities well, there is no obvious swelling or deformities noted, pulses are intact.  Respiratory: Airway is open and patent, respirations are spontaneous and even.  Cardiac: normal rate and rhythm, 1+ pedal edema which is reported better than normal. and cap refill is <3sec  Abdomen: soft, non-tender and non-distended  Neuro: Pt follows commands easily and has no obvious deficits

## 2022-11-17 NOTE — ED NOTES
Pt is on continuous cardiac monitoring, pulse ox and BP is cycling every 30 min. Call light is within reach

## 2022-11-17 NOTE — ED PROVIDER NOTES
Encounter Date: 11/16/2022       History     Chief Complaint   Patient presents with    Abnormal Lab     Had lab drawn at dialysis yesterday and was called to for HMG 6.7. Dialysis Tu, Th, SA. Reports weakness, but no more than usual.      58 yo female with multiple medical problems including ESRD since 2016. States she was called tonight by the dialysis center stating to go to the ER due to Hb 6.3. Lab was drawn yesterday. Completed dialysis yesterday. Pt has felt generally weak for one week. Pt's  states 2 and three days ago pt has a small amount of blood in her stool that was bright red.     Review of patient's allergies indicates:   Allergen Reactions    Coreg [carvedilol] Other (See Comments)     Nausea/vomiting    Allopurinol      Other reaction(s): abnormal transaminases     Past Medical History:   Diagnosis Date    Anemia     Bronchitis 03/01/2017    Cancer 2016    kidney cancer    CHF (congestive heart failure), NYHA class II, chronic, systolic     CMV (cytomegalovirus) antibody positive     Essential hypertension 9/23/2015    H/O herpes simplex type 2 infection     Herpes simplex type 1 antibody positive     History of kidney cancer     s/p left nephrectomy 1/2016    Hyperparathyroidism, unspecified     Hyperuricemia without signs of inflammatory arthritis and tophaceous disease     Kidney stones     LGSIL (low grade squamous intraepithelial dysplasia)     Myocardiopathy 7/21/2017    Prediabetes     Proteinuria     Renal disorder     Thyroid nodule     Urate nephropathy      Past Surgical History:   Procedure Laterality Date    BREAST CYST EXCISION      COLONOSCOPY N/A 11/12/2015    COLONOSCOPY N/A 3/12/2021    Procedure: COLONOSCOPY;  Surgeon: Brendon Lanier MD;  Location: [facility];  Service: Endoscopy;  Laterality: N/A;  covid test 3/9, labs prior, prep instr mailed -ml    INSERTION OF BIVENTRICULAR IMPLANTABLE CARDIOVERTER-DEFIBRILLATOR (ICD)  04/2021    NEPHRECTOMY-LAPAROSCOPIC Left  01/12/2016    PERITONEAL CATHETER INSERTION      Permacath insertion  01/12/2017    SALPINGOOPHORECTOMY Right 2016    KJB---DAVINCI    TONSILLECTOMY      TUBAL LIGATION       Family History   Problem Relation Age of Onset    Hypertension Mother     Diabetes Father     Kidney disease Father     No Known Problems Son     No Known Problems Son     Diabetes Maternal Grandfather     No Known Problems Sister     No Known Problems Brother     No Known Problems Maternal Grandmother     No Known Problems Paternal Grandmother     No Known Problems Paternal Grandfather     Heart disease Sister     No Known Problems Sister     No Known Problems Brother     No Known Problems Maternal Aunt     No Known Problems Maternal Uncle     No Known Problems Paternal Aunt     No Known Problems Paternal Uncle     Breast cancer Neg Hx     Colon cancer Neg Hx     Cancer Neg Hx     Stroke Neg Hx     Amblyopia Neg Hx     Blindness Neg Hx     Cataracts Neg Hx     Glaucoma Neg Hx     Macular degeneration Neg Hx     Retinal detachment Neg Hx     Strabismus Neg Hx     Thyroid disease Neg Hx      Social History     Tobacco Use    Smoking status: Never    Smokeless tobacco: Never   Substance Use Topics    Alcohol use: No     Comment: . 2 children. works at [employer].    Drug use: No     Review of Systems   Constitutional:  Positive for fatigue. Negative for fever.   HENT:  Negative for sore throat.    Eyes:  Negative for visual disturbance.   Respiratory:  Negative for shortness of breath.    Cardiovascular:  Negative for chest pain.   Gastrointestinal:  Negative for abdominal pain.   Genitourinary:  Negative for difficulty urinating.   Musculoskeletal:  Negative for back pain.   Skin:  Negative for rash.   Neurological:  Positive for weakness. Negative for headaches.     Physical Exam     Initial Vitals [11/16/22 1932]   BP Pulse Resp Temp SpO2   (!) 147/70 79 16 98.6 °F (37 °C) 99 %      MAP       --         Physical Exam    Nursing note and  vitals reviewed.  Constitutional: She appears well-developed and well-nourished.   Eyes: EOM are normal. Pupils are equal, round, and reactive to light.   Neck: Neck supple. No thyromegaly present. No JVD present.   Normal range of motion.  Cardiovascular:  Normal rate, regular rhythm and intact distal pulses.     Exam reveals no gallop and no friction rub.       Murmur heard.  Pulmonary/Chest: Breath sounds normal. No respiratory distress. She has no wheezes. She has no rales.   Abdominal: Abdomen is soft. Bowel sounds are normal. There is no abdominal tenderness.   Genitourinary: Rectum:      Guaiac result positive.   Guaiac positive stool. : Acceptable.  Musculoskeletal:         General: Edema present. No tenderness. Normal range of motion.      Cervical back: Normal range of motion and neck supple.     Neurological: She is alert and oriented to person, place, and time. She has normal strength.   Skin: Skin is warm and dry. There is pallor.       ED Course   Procedures  Labs Reviewed   CBC W/ AUTO DIFFERENTIAL - Abnormal; Notable for the following components:       Result Value    RBC 2.37 (*)     Hemoglobin 7.6 (*)     Hematocrit 25.6 (*)      (*)     MCH 32.1 (*)     MCHC 29.7 (*)     RDW 20.8 (*)     Immature Granulocytes 0.7 (*)     Immature Grans (Abs) 0.07 (*)     nRBC 1 (*)     All other components within normal limits   COMPREHENSIVE METABOLIC PANEL - Abnormal; Notable for the following components:    CO2 21 (*)     Glucose 118 (*)     BUN 74 (*)     Creatinine 5.3 (*)     Albumin 1.8 (*)     Total Bilirubin 1.2 (*)     Alkaline Phosphatase 609 (*)     AST 70 (*)     ALT 65 (*)     Anion Gap 18 (*)     eGFR 9 (*)     All other components within normal limits   B-TYPE NATRIURETIC PEPTIDE - Abnormal; Notable for the following components:    BNP >4,900 (*)     All other components within normal limits   TROPONIN I - Abnormal; Notable for the following components:    Troponin I 0.360  (*)     All other components within normal limits   MAGNESIUM - Abnormal; Notable for the following components:    Magnesium 2.9 (*)     All other components within normal limits   PROTIME-INR   PHOSPHORUS   FOLATE   VITAMIN B12   IRON AND TIBC   TYPE & SCREEN          Imaging Results    None          Medications - No data to display      HB 7.6. 10 days ago it was 7.7.  BRBPR per  not seen in 1-2 days. Trace heme positve on smear. Pt and  prefer not to stay in hospital. I am not recommending transfusion at this time.  Will refer to GI and follow up with nephrologist.                      Clinical Impression:   Final diagnoses:  [R53.1] Weakness  [D64.9] Anemia, unspecified type (Primary)  [K92.2] Lower GI bleed        ED Disposition Condition    Discharge Stable          ED Prescriptions    None       Follow-up Information       Follow up With Specialties Details Why Contact Info    Roberto Husain MD Gastroenterology Schedule an appointment as soon as possible for a visit  for lower gi bleeding. [address] [facility] [id number]  [phone]          call you nephrologist for anemia recheck.             Artis Boyd MD  11/16/22 [id number]

## 2022-11-17 NOTE — PROGRESS NOTES
This note was created by combination of typed  and M-Modal dictation.  Transcription errors may be present.  If there are any questions, please contact me.    Assessment and Plan:   Transaminitis  -hospitalization for sepsis, was started on antibiotics, ultimately cultures were all negative.  Antibiotics were stopped.  Remains afebrile.  Hospital course notable for gallstones but unable to prove this was the causative agent, HIDA scan was inconclusive felt to be due to hepatic congestion from heart failure.    She had transaminitis which is trending down, most recent labs 2 days ago were significantly improved compared to hospitalization, was seen by Gastroenterology while hospitalized felt to be congestive hepatopathy from heart failure    ESRD (end stage renal disease) on dialysis  Anemia in ESRD (end-stage renal disease)  -dialyzes Tuesday, Thursday, Saturday  Gets Procrit with dialysis  Colonoscopy was done March 2021 showing sigmoid hyperplastic polyp and hemorrhoids.  Hemoglobin done on Wednesday was stable compared to hemoglobin at discharge.  -     WHEELCHAIR FOR HOME USE    Cardiomyopathy, nonischemic  Essential hypertension  PAF (paroxysmal atrial fibrillation)  Hypertensive CKD, ESRD on dialysis  -followed by Cardiology.  Nonischemic cardiomyopathy and atrial fibrillation.  On Entresto, metoprolol, on DOAC.  Needs a refill on the metoprolol.  Refilled to pharmacy.  -     WHEELCHAIR FOR HOME USE  -     metoprolol succinate (TOPROL-XL) 100 MG 24 hr tablet; Take 1 tablet (100 mg total) by mouth once daily.  Dispense: 90 tablet; Refill: 3    Glucose intolerance  -stable    Debility  -debility after hospitalization.  Reports that prior she was able to walk independently.  Comes in today with a walker, was wheeled in on wheelchair,  requesting transport likely wheelchair and handicap placard   I have signed for temporary handicap placard and light weight transport wheelchair.  Remains to be  seen how much recovery she will have as it is early on in her recovery process with recent hospitalization.  -     WHEELCHAIR FOR HOME USE    Medications Discontinued During This Encounter   Medication Reason    metoprolol succinate (TOPROL-XL) 100 MG 24 hr tablet Reorder       meds sent this encounter:  Medications Ordered This Encounter   Medications    metoprolol succinate (TOPROL-XL) 100 MG 24 hr tablet     Sig: Take 1 tablet (100 mg total) by mouth once daily.     Dispense:  90 tablet     Refill:  3     .       Follow Up: No follow-ups on file.    Subjective:     Chief Complaint   Patient presents with    Follow-up     ED        HPI  Eva is a 57 y.o. female, last appointment with this clinic was 10/26/2022.  Social History     Tobacco Use    Smoking status: Never    Smokeless tobacco: Never   Substance Use Topics    Alcohol use: No     Comment: . 2 children. works at [employer].      Social History     Occupational History    Occupation: [employer]     Employer: [id number]      Social History     Social History Narrative    Not on file       Patient's last menstrual period was 10/24/2016.    New to me  ESRD/HD   Resultant anemia on Procrit followed by Nephrology.  History of RCC status post nephrectomy  Combined heart failure, nonischemic, status post AICD.  9/22 changed ACE-inhibitor to ARNI  Atrial fibrillation on anticoagulation.  9/22 change from warfarin to DOAC  Glucose intolerance    Hospitalization 10/29 through 11/6  Hospital Course:   57 year old female with ESRD presents to the ED to evaluate her worsening bilateral leg pain and weakness for 5 days.  Patient was hypothermic in ER with lactic acidosis.  Started on Bicarb drip and underwent HD.  Started on broad spectrum ABx's.  Elevated Troponin and Cardiology consulted.  Echo shows EF of 25%.  Denies any chest pain and no plans for intervention at this time.  US abdomen shows possible acute cholecystitis.  Surgery consulted.  HIDA unable to  "eval gallbladder due to poor hepatic clearance. No indication for cholecystostomy drain as pain has improved per Surgery.  PT/OT consulted.  Recommending Rehab.  SW consulted.  All cultures negative with no obvious source of infection and ABx's stopped.  Patient and  do not want Rehab and want to go home with .  She has remained afebrile and hemodynamically stable.  Elevated LFT's on admit have gradually improved.  Patient will be discharged home with HH and DME.  She will follow up with PCP.  GI consult during hospitalization:  Liver lesion on current US, previous CT from 2021 showing liver lesions or cysts. HIDA showing poor liver clearance. Not likely to tolerate any invasive diagnostic at this time.   Bili and lfts showing improvement. Abnormalities likely due to hepatobiliary congestion related to HF.  Seen by palliative while inpt:   seems to have good insight into pt's multiple life-limiting conditions including ESRD and CHF;  sites pt's age as main reason for desire for continued aggressive treatment of all reversible symptoms/conditions and desire for full code and resuscitation; associated risks and potential outcomes of aggressive resuscitation measures discussed;  advocates that "she is too young we at least have to try" but if no potential for recovery would not wish for prolonged life support for pt     Sent to the ED 11/16 with hemoglobin 6.3 at dialysis  In the ED hemoglobin 7.6.  Stable compared to 10 days ago.  Trace Hemoccult positive on smear.  Was not admitted.  Plan is follow-up with nephrology and Gastroenterology.    03/2021 colonoscopy 3 mm sigmoid HP.  Hemorrhoids.    Labs 11/16 - LFTs trending down. Hb 7.6    She is here accompanied by her .  History from the patient and the .    Notes that prior to her hospitalization she was able to ambulate without assistance but since hospitalization she is been quite debilitated.  She comes in today with a " walker but she is wheeled in in a wheelchair.     is requesting a transport wheelchair and a handicap placard.    I will order a light-weight wheelchair but also has temporary handicap placard.  It is unclear if she is going to be fully mobile again but only recently got discharged from the hospital.    Reviewed the discharge summary.  It is unclear the initial causative event/insult.  She came in with septic appearance, elevated lactic acidosis, ultimately cultures were negative though she was treated with antibiotics.    Going hypothesis is that she had heart failure causing SIRS, congestive hepatitis.    She denies any pain.  Notes that she is eating.  Tolerating food okay without pain.    No fevers or chills since discharge.    Recent ED this past Wednesday, hemoglobin was stable compared to hemoglobin at time of discharge.    LFTs are trending down.   notes that she is weak but improved compared to discharge.    Patient Care Team:  Sowmya Mccain MD as PCP - General (Internal Medicine)  Ben Rivera MD as Consulting Physician (Hematology and Oncology)  Leora Louis MD (Cardiology)  Williams Hart MD (Inactive) as Consulting Physician (Nephrology)  Lulu Le LPN as Licensed Practical Nurse  [facility] as Post Acute Care Provider (Dialysis Center)    Patient Active Problem List    Diagnosis Date Noted    Debility 11/04/2022    Transaminitis 10/29/2022    PAF (paroxysmal atrial fibrillation) 07/25/2022    Glucose intolerance 04/18/2022    Hypercalcemia 04/04/2022    Right-sided epistaxis 10/22/2021    Left-sided epistaxis 10/22/2021    ICD (implantable cardioverter-defibrillator) in place - SubQ 07/15/2021    Hyperphosphatemia     Anemia in ESRD (end-stage renal disease)     Chronic kidney disease-mineral and bone disorder     Chronic combined systolic and diastolic congestive heart failure      - s/p ICD      Nonrheumatic mitral valve regurgitation 06/01/2020    ESRD (end  "stage renal disease) on dialysis      - Tuesday, Thursday, and Saturday  - [place]  - Dr. Hart, nephrology      Cardiomyopathy, nonischemic 07/21/2017    Pulmonary hypertension 07/21/2017    Primary insomnia 06/14/2017    S/p nephrectomy left 10/07/2016    History of kidney cancer 09/02/2016    Multinodular goiter 11/25/2015     - benign FNA 12/2015      CMV (cytomegalovirus) antibody positive     Herpes simplex type 1 antibody positive     H/O herpes simplex type 2 infection      - positive antibody test  - reports no hx of outbreaks      Essential hypertension 09/23/2015    Pap smear abnormality of cervix with ASCUS favoring benign 09/23/2015     She is having the PAP "once a year and comes back ok she says"      Urate nephropathy     Kidney stones     Hyperparathyroidism, secondary renal     Hyperuricemia without signs of inflammatory arthritis and tophaceous disease     Proteinuria        PAST MEDICAL PROBLEMS, PAST SURGICAL HISTORY: please see relevant portions of the electronic medical record    ALLERGIES AND MEDICATIONS: updated and reviewed.  Review of patient's allergies indicates:   Allergen Reactions    Coreg [carvedilol] Other (See Comments)     Nausea/vomiting    Allopurinol      Other reaction(s): abnormal transaminases       Medication List with Changes/Refills   Current Medications    ACETAMINOPHEN (TYLENOL) 500 MG TABLET    Take 500 mg by mouth every 6 (six) hours as needed for Pain.    APIXABAN (ELIQUIS) 2.5 MG TAB    Take 1 tablet (2.5 mg total) by mouth 2 (two) times daily.    CABOMETYX 60 MG TAB    TAKE ONE TABLET BY MOUTH ONCE DAILY AT THE SAME TIME ON AN EMPTY STOMACH AT LEAST 1 HOUR BEFORE OR 2 HOURS AFTER EATING. AVOID GRAPEFRUIT PRODUCTS    CLONIDINE 0.3 MG/24 HR TD PTWK (CATAPRES) 0.3 MG/24 HR    Place 1 patch onto the skin every 7 days.    EPOETIN DAVID-EPBX (RETACRIT INJ)    Epoetin david - epbx (Retacrit)    HYDRALAZINE (APRESOLINE) 100 MG TABLET    Take 1 tablet (100 mg total) by " "mouth every 12 (twelve) hours.    LIDOCAINE-PRILOCAINE (EMLA) CREAM    APPLY ATLEAST 30 MINUTES BEFORE TREATMENT 3 TIMES A WEEK    METOPROLOL SUCCINATE (TOPROL-XL) 100 MG 24 HR TABLET    Take 1 tablet (100 mg total) by mouth once daily.    MV,CA,MIN-FOLIC ACID-VIT K1 (ONE-A-DAY WOMEN'S 50 PLUS) 400-20 MCG TAB    Take 1 tablet by mouth.    ONDANSETRON (ZOFRAN-ODT) 8 MG TBDL    Take 1 tablet (8 mg total) by mouth every 12 (twelve) hours as needed.    OXYCODONE-ACETAMINOPHEN (PERCOCET) 5-325 MG PER TABLET    Take 1 tablet by mouth every 6 (six) hours as needed for Pain.    PROMETHAZINE (PHENERGAN) 25 MG TABLET    Take 1 tablet (25 mg total) by mouth every 6 (six) hours as needed for Nausea.    SACUBITRIL-VALSARTAN (ENTRESTO) 49-51 MG PER TABLET    Take 1 tablet by mouth 2 (two) times daily.    SODIUM CHLORIDE 0.9% SOLP 100 ML WITH IRON SUCROSE 100 MG IRON/5 ML SOLN 100 MG    50 mg.        Objective:   Physical Exam   Vitals:    11/18/22 0904   BP: 132/62   BP Location: Left arm   Patient Position: Sitting   BP Method: Medium (Manual)   Pulse: 76   Temp: 98.4 °F (36.9 °C)   TempSrc: Oral   SpO2: 96%   Weight: 52.2 kg (115 lb)   Height: 5' 4" (1.626 m)    Body mass index is 19.74 kg/m².  Weight: 52.2 kg (115 lb)   Height: 5' 4" (162.6 cm)     Physical Exam  Constitutional:       General: She is not in acute distress.     Appearance: She is well-developed.   Eyes:      Extraocular Movements: Extraocular movements intact.   Cardiovascular:      Rate and Rhythm: Normal rate and regular rhythm.      Heart sounds: Normal heart sounds. No murmur heard.  Pulmonary:      Effort: Pulmonary effort is normal.      Breath sounds: Normal breath sounds.   Musculoskeletal:         General: Normal range of motion.      Right lower leg: Edema present.      Left lower leg: Edema present.   Skin:     General: Skin is warm and dry.   Neurological:      Mental Status: She is alert and oriented to person, place, and time.      Coordination: " Coordination normal.   Psychiatric:         Behavior: Behavior normal.         Thought Content: Thought content normal.

## 2022-11-17 NOTE — PHARMACY MED REC
"Admission Medication History     The home medication history was taken by Olivia Grey.    You may go to "Admission" then "Reconcile Home Medications" tabs to review and/or act upon these items.     The home medication list has been updated by the Pharmacy department.   Please read ALL comments highlighted in yellow.   Please address this information as you see fit.    Feel free to contact us if you have any questions or require assistance.      The medications listed below were removed from the home medication list. Please reorder if appropriate:  Patient reports no longer taking the following medication(s):  Renvela      Medications listed below were obtained from: Patient/family and Analytic software- [employer]  (Not in a hospital admission)      Potential issues to be addressed PRIOR TO DISCHARGE  Patient requested refills for the following medications: (metoprolol)    Olivia Grey  [phone]                 .        "

## 2022-11-18 ENCOUNTER — OFFICE VISIT (OUTPATIENT)
Dept: FAMILY MEDICINE | Facility: CLINIC | Age: 57
End: 2022-11-18
Payer: MEDICARE

## 2022-11-18 ENCOUNTER — TELEPHONE (OUTPATIENT)
Dept: FAMILY MEDICINE | Facility: CLINIC | Age: 57
End: 2022-11-18

## 2022-11-18 VITALS
WEIGHT: 115 LBS | SYSTOLIC BLOOD PRESSURE: 132 MMHG | OXYGEN SATURATION: 96 % | TEMPERATURE: 98 F | DIASTOLIC BLOOD PRESSURE: 62 MMHG | BODY MASS INDEX: 19.63 KG/M2 | HEART RATE: 76 BPM | HEIGHT: 64 IN

## 2022-11-18 DIAGNOSIS — I42.8 CARDIOMYOPATHY, NONISCHEMIC: ICD-10-CM

## 2022-11-18 DIAGNOSIS — R74.01 TRANSAMINITIS: Primary | ICD-10-CM

## 2022-11-18 DIAGNOSIS — I48.0 PAF (PAROXYSMAL ATRIAL FIBRILLATION): ICD-10-CM

## 2022-11-18 DIAGNOSIS — N18.6 ANEMIA IN ESRD (END-STAGE RENAL DISEASE): ICD-10-CM

## 2022-11-18 DIAGNOSIS — Z99.2 HYPERTENSIVE CKD, ESRD ON DIALYSIS: ICD-10-CM

## 2022-11-18 DIAGNOSIS — D63.1 ANEMIA IN ESRD (END-STAGE RENAL DISEASE): ICD-10-CM

## 2022-11-18 DIAGNOSIS — E74.39 GLUCOSE INTOLERANCE: ICD-10-CM

## 2022-11-18 DIAGNOSIS — I12.0 HYPERTENSIVE CKD, ESRD ON DIALYSIS: ICD-10-CM

## 2022-11-18 DIAGNOSIS — Z99.2 ESRD (END STAGE RENAL DISEASE) ON DIALYSIS: ICD-10-CM

## 2022-11-18 DIAGNOSIS — N18.6 HYPERTENSIVE CKD, ESRD ON DIALYSIS: ICD-10-CM

## 2022-11-18 DIAGNOSIS — I10 ESSENTIAL HYPERTENSION: ICD-10-CM

## 2022-11-18 DIAGNOSIS — N18.6 ESRD (END STAGE RENAL DISEASE) ON DIALYSIS: ICD-10-CM

## 2022-11-18 DIAGNOSIS — R53.81 DEBILITY: ICD-10-CM

## 2022-11-18 PROCEDURE — 99999 PR PBB SHADOW E&M-EST. PATIENT-LVL IV: CPT | Mod: PBBFAC,,, | Performed by: INTERNAL MEDICINE

## 2022-11-18 PROCEDURE — 99214 PR OFFICE/OUTPT VISIT, EST, LEVL IV, 30-39 MIN: ICD-10-PCS | Mod: S$PBB,,, | Performed by: INTERNAL MEDICINE

## 2022-11-18 PROCEDURE — 99999 PR PBB SHADOW E&M-EST. PATIENT-LVL IV: ICD-10-PCS | Mod: PBBFAC,,, | Performed by: INTERNAL MEDICINE

## 2022-11-18 PROCEDURE — 99214 OFFICE O/P EST MOD 30 MIN: CPT | Mod: S$PBB,,, | Performed by: INTERNAL MEDICINE

## 2022-11-18 PROCEDURE — 99214 OFFICE O/P EST MOD 30 MIN: CPT | Mod: PBBFAC,PO | Performed by: INTERNAL MEDICINE

## 2022-11-18 RX ORDER — METOPROLOL SUCCINATE 100 MG/1
100 TABLET, EXTENDED RELEASE ORAL DAILY
Qty: 90 TABLET | Refills: 3 | Status: SHIPPED | OUTPATIENT
Start: 2022-11-18 | End: 2022-12-19

## 2022-11-22 ENCOUNTER — EXTERNAL HOME HEALTH (OUTPATIENT)
Dept: HOME HEALTH SERVICES | Facility: HOSPITAL | Age: 57
End: 2022-11-22
Payer: MEDICARE

## 2022-11-23 ENCOUNTER — HOSPITAL ENCOUNTER (OUTPATIENT)
Dept: RADIOLOGY | Facility: HOSPITAL | Age: 57
Discharge: HOME | End: 2022-11-23
Attending: INTERNAL MEDICINE
Payer: MEDICARE

## 2022-11-23 DIAGNOSIS — C64.9 METASTATIC RENAL CELL CARCINOMA TO INTRA-ABDOMINAL SITE: ICD-10-CM

## 2022-11-23 DIAGNOSIS — C79.89 METASTATIC RENAL CELL CARCINOMA TO INTRA-ABDOMINAL SITE: ICD-10-CM

## 2022-11-23 DIAGNOSIS — C64.2 METASTATIC RENAL CELL CARCINOMA, LEFT: ICD-10-CM

## 2022-11-23 DIAGNOSIS — C64.9 METASTATIC RENAL CELL CARCINOMA, UNSPECIFIED LATERALITY: ICD-10-CM

## 2022-11-23 DIAGNOSIS — N18.6 ESRD (END STAGE RENAL DISEASE) ON DIALYSIS: ICD-10-CM

## 2022-11-23 DIAGNOSIS — Z99.2 ESRD (END STAGE RENAL DISEASE) ON DIALYSIS: ICD-10-CM

## 2022-11-23 PROCEDURE — 74176 CT CHEST ABDOMEN PELVIS WITHOUT CONTRAST(XPD): ICD-10-PCS | Mod: 26,,, | Performed by: RADIOLOGY

## 2022-11-23 PROCEDURE — 74176 CT ABD & PELVIS W/O CONTRAST: CPT | Mod: TC

## 2022-11-23 PROCEDURE — 25500020 PHARM REV CODE 255: Performed by: INTERNAL MEDICINE

## 2022-11-23 PROCEDURE — 74176 CT ABD & PELVIS W/O CONTRAST: CPT | Mod: 26,,, | Performed by: RADIOLOGY

## 2022-11-23 PROCEDURE — 71250 CT THORAX DX C-: CPT | Mod: 26,,, | Performed by: RADIOLOGY

## 2022-11-23 PROCEDURE — 71250 CT CHEST ABDOMEN PELVIS WITHOUT CONTRAST(XPD): ICD-10-PCS | Mod: 26,,, | Performed by: RADIOLOGY

## 2022-11-23 PROCEDURE — A9698 NON-RAD CONTRAST MATERIALNOC: HCPCS | Performed by: INTERNAL MEDICINE

## 2022-11-23 RX ADMIN — BARIUM SULFATE 450 ML: 20 SUSPENSION ORAL at 10:11

## 2022-11-30 ENCOUNTER — LAB VISIT (OUTPATIENT)
Dept: LAB | Facility: HOSPITAL | Age: 57
End: 2022-11-30
Attending: INTERNAL MEDICINE
Payer: MEDICARE

## 2022-11-30 ENCOUNTER — OFFICE VISIT (OUTPATIENT)
Dept: HEMATOLOGY/ONCOLOGY | Facility: CLINIC | Age: 57
End: 2022-11-30
Payer: MEDICARE

## 2022-11-30 VITALS
SYSTOLIC BLOOD PRESSURE: 109 MMHG | OXYGEN SATURATION: 97 % | HEART RATE: 76 BPM | HEIGHT: 64 IN | TEMPERATURE: 98 F | DIASTOLIC BLOOD PRESSURE: 66 MMHG | BODY MASS INDEX: 19.74 KG/M2

## 2022-11-30 DIAGNOSIS — C64.9 METASTATIC RENAL CELL CARCINOMA, UNSPECIFIED LATERALITY: ICD-10-CM

## 2022-11-30 DIAGNOSIS — R93.89 ABNORMAL FINDINGS ON DIAGNOSTIC IMAGING OF OTHER SPECIFIED BODY STRUCTURES: ICD-10-CM

## 2022-11-30 DIAGNOSIS — E03.9 HYPOTHYROIDISM, UNSPECIFIED TYPE: ICD-10-CM

## 2022-11-30 DIAGNOSIS — C64.9 METASTATIC RENAL CELL CARCINOMA, UNSPECIFIED LATERALITY: Primary | ICD-10-CM

## 2022-11-30 LAB
ALBUMIN SERPL BCP-MCNC: 1.9 G/DL (ref 3.5–5.2)
ALP SERPL-CCNC: 303 U/L (ref 55–135)
ALT SERPL W/O P-5'-P-CCNC: 26 U/L (ref 10–44)
ANION GAP SERPL CALC-SCNC: 13 MMOL/L (ref 8–16)
AST SERPL-CCNC: 40 U/L (ref 10–40)
BASOPHILS # BLD AUTO: 0.14 K/UL (ref 0–0.2)
BASOPHILS NFR BLD: 1.1 % (ref 0–1.9)
BILIRUB SERPL-MCNC: 1.3 MG/DL (ref 0.1–1)
BUN SERPL-MCNC: 34 MG/DL (ref 6–20)
CALCIUM SERPL-MCNC: 9 MG/DL (ref 8.7–10.5)
CHLORIDE SERPL-SCNC: 99 MMOL/L (ref 95–110)
CO2 SERPL-SCNC: 28 MMOL/L (ref 23–29)
CREAT SERPL-MCNC: 5.1 MG/DL (ref 0.5–1.4)
DIFFERENTIAL METHOD: ABNORMAL
EOSINOPHIL # BLD AUTO: 0.3 K/UL (ref 0–0.5)
EOSINOPHIL NFR BLD: 2.1 % (ref 0–8)
ERYTHROCYTE [DISTWIDTH] IN BLOOD BY AUTOMATED COUNT: 17.8 % (ref 11.5–14.5)
EST. GFR  (NO RACE VARIABLE): 9.2 ML/MIN/1.73 M^2
GLUCOSE SERPL-MCNC: 92 MG/DL (ref 70–110)
HCT VFR BLD AUTO: 34.8 % (ref 37–48.5)
HGB BLD-MCNC: 10.2 G/DL (ref 12–16)
IMM GRANULOCYTES # BLD AUTO: 0.16 K/UL (ref 0–0.04)
IMM GRANULOCYTES NFR BLD AUTO: 1.2 % (ref 0–0.5)
LYMPHOCYTES # BLD AUTO: 2 K/UL (ref 1–4.8)
LYMPHOCYTES NFR BLD: 14.9 % (ref 18–48)
MCH RBC QN AUTO: 29.9 PG (ref 27–31)
MCHC RBC AUTO-ENTMCNC: 29.3 G/DL (ref 32–36)
MCV RBC AUTO: 102 FL (ref 82–98)
MONOCYTES # BLD AUTO: 1.1 K/UL (ref 0.3–1)
MONOCYTES NFR BLD: 8.3 % (ref 4–15)
NEUTROPHILS # BLD AUTO: 9.6 K/UL (ref 1.8–7.7)
NEUTROPHILS NFR BLD: 72.4 % (ref 38–73)
NRBC BLD-RTO: 0 /100 WBC
PLATELET # BLD AUTO: 383 K/UL (ref 150–450)
PMV BLD AUTO: 9.7 FL (ref 9.2–12.9)
POTASSIUM SERPL-SCNC: 3.4 MMOL/L (ref 3.5–5.1)
PROT SERPL-MCNC: 6.1 G/DL (ref 6–8.4)
RBC # BLD AUTO: 3.41 M/UL (ref 4–5.4)
SODIUM SERPL-SCNC: 140 MMOL/L (ref 136–145)
T4 FREE SERPL-MCNC: 0.92 NG/DL (ref 0.71–1.51)
TSH SERPL DL<=0.005 MIU/L-ACNC: 8.61 UIU/ML (ref 0.4–4)
WBC # BLD AUTO: 13.21 K/UL (ref 3.9–12.7)

## 2022-11-30 PROCEDURE — 99215 PR OFFICE/OUTPT VISIT, EST, LEVL V, 40-54 MIN: ICD-10-PCS | Mod: S$PBB,,, | Performed by: INTERNAL MEDICINE

## 2022-11-30 PROCEDURE — 80053 COMPREHEN METABOLIC PANEL: CPT | Performed by: INTERNAL MEDICINE

## 2022-11-30 PROCEDURE — 99999 PR PBB SHADOW E&M-EST. PATIENT-LVL II: ICD-10-PCS | Mod: PBBFAC,,, | Performed by: INTERNAL MEDICINE

## 2022-11-30 PROCEDURE — 84439 ASSAY OF FREE THYROXINE: CPT | Performed by: INTERNAL MEDICINE

## 2022-11-30 PROCEDURE — 99215 OFFICE O/P EST HI 40 MIN: CPT | Mod: S$PBB,,, | Performed by: INTERNAL MEDICINE

## 2022-11-30 PROCEDURE — 84443 ASSAY THYROID STIM HORMONE: CPT | Performed by: INTERNAL MEDICINE

## 2022-11-30 PROCEDURE — 85025 COMPLETE CBC W/AUTO DIFF WBC: CPT | Performed by: INTERNAL MEDICINE

## 2022-11-30 PROCEDURE — 99212 OFFICE O/P EST SF 10 MIN: CPT | Mod: PBBFAC | Performed by: INTERNAL MEDICINE

## 2022-11-30 PROCEDURE — 36415 COLL VENOUS BLD VENIPUNCTURE: CPT | Performed by: INTERNAL MEDICINE

## 2022-11-30 PROCEDURE — 99999 PR PBB SHADOW E&M-EST. PATIENT-LVL II: CPT | Mod: PBBFAC,,, | Performed by: INTERNAL MEDICINE

## 2022-11-30 NOTE — PROGRESS NOTES
"Subjective:       Patient ID: Eva Bloom is a 57 y.o. female.    Chief Complaint: RCC    HPI     57 y.o.female, with metastatic RCC, here for f/u.  Recent hospitalization with sepsis-like picture, low EF, vol overload (missed HD).      Pt is currently taking cabozantinib daily since December 2016. Pt is on ESRD with HD every TTS. Patient tolerating treat extremely well with little to no side effects.     Today, she denies any mouth sores, nausea, vomiting, diarrhea, constipation,weight loss, chest pain, leg swelling, headache, dizziness, or mood changes.    ECOG 1. She is accompanied with her .    Oncologic History (From Chart and Patient):  Eva Bloom is a 57 y.o.female with ESRD on HD who was found to have two L renal masses. She underwent L lap nephrectomy on 1/12/16. 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 Margins were negative.  Shehealed from surgery well, but then developed a large ovarian mass.  This was removed by gyn along with multiple sites of metastatic disease in the pelvis.  Path was c/w recurrence of RCC.     11/20/16 Pelvic ultrasound reveals "Large heterogeneous cystic and solid mass which appears to arise from the right ovary with worrisome imaging features for malignancy.  Differential diagnosis includes malignant tumors such as serous or mucinous cystadenocarcinoma.  It is important to note that the patient is at risk for ovarian torsion due to the size of the mass.Multiple uterine fibroids are identified with the largest in the uterine fundus."    11/22/16 pathology reveals "FINAL PATHOLOGIC DIAGNOSIS-RIGHT OVARY AND FALLOPIAN TUBE, RIGHT SALPINGO-OOPHORECTOMY:  -Positive for malignancy, high grade carcinoma morphologically and immunohistochemically consistent with metastasis from the patient's known renal cell carcinoma"    Review of Systems "   Constitutional:  Positive for fatigue. Negative for activity change, appetite change, chills and fever.   HENT:  Negative for congestion, hearing loss, mouth sores, postnasal drip, sore throat, tinnitus and voice change.    Eyes:  Negative for pain and visual disturbance.   Respiratory:  Negative for cough, shortness of breath and wheezing.    Cardiovascular:  Negative for chest pain, palpitations and leg swelling.   Gastrointestinal:  Negative for abdominal pain, constipation, diarrhea, nausea and vomiting.   Endocrine: Negative for cold intolerance and heat intolerance.   Genitourinary:  Negative for difficulty urinating, dyspareunia, dysuria, frequency, menstrual problem, urgency, vaginal bleeding, vaginal discharge and vaginal pain.   Musculoskeletal:  Negative for arthralgias and myalgias.   Skin:  Negative for color change, rash and wound.   Allergic/Immunologic: Negative for environmental allergies and food allergies.   Neurological:  Negative for weakness, numbness and headaches.   Hematological:  Negative for adenopathy. Does not bruise/bleed easily.   Psychiatric/Behavioral:  Negative for agitation, confusion, hallucinations and sleep disturbance. The patient is not nervous/anxious.    All other systems reviewed and are negative.    Allergies:  Review of patient's allergies indicates:   Allergen Reactions    Allopurinol      Other reaction(s): abnormal transaminases       Medications:  Current Outpatient Medications   Medication Sig Dispense Refill    acetaminophen (TYLENOL) 500 MG tablet Take 500 mg by mouth every 6 (six) hours as needed for Pain.      apixaban (ELIQUIS) 2.5 mg Tab Take 1 tablet (2.5 mg total) by mouth 2 (two) times daily. 60 tablet 11    CABOMETYX 60 mg Tab TAKE ONE TABLET BY MOUTH ONCE DAILY AT THE SAME TIME ON AN EMPTY STOMACH AT LEAST 1 HOUR BEFORE OR 2 HOURS AFTER EATING. AVOID GRAPEFRUIT PRODUCTS 30 tablet 4    cloNIDine 0.3 mg/24 hr td ptwk (CATAPRES) 0.3 mg/24 hr Place 1 patch  onto the skin every 7 days. 4 patch 11    epoetin david-epbx (RETACRIT INJ) Epoetin david - epbx (Retacrit)      hydrALAZINE (APRESOLINE) 100 MG tablet Take 1 tablet (100 mg total) by mouth every 12 (twelve) hours. 180 tablet 2    lidocaine-prilocaine (EMLA) cream APPLY ATLEAST 30 MINUTES BEFORE TREATMENT 3 TIMES A WEEK 30 g 11    metoprolol succinate (TOPROL-XL) 100 MG 24 hr tablet Take 1 tablet (100 mg total) by mouth once daily. 90 tablet 3    mv,Ca,min-folic acid-vit K1 (ONE-A-DAY WOMEN'S 50 PLUS) 400-20 mcg Tab Take 1 tablet by mouth.      ondansetron (ZOFRAN-ODT) 8 MG TbDL Take 1 tablet (8 mg total) by mouth every 12 (twelve) hours as needed. 30 tablet 1    oxyCODONE-acetaminophen (PERCOCET) 5-325 mg per tablet Take 1 tablet by mouth every 6 (six) hours as needed for Pain. 30 each 0    promethazine (PHENERGAN) 25 MG tablet Take 1 tablet (25 mg total) by mouth every 6 (six) hours as needed for Nausea. 15 tablet 0    sacubitriL-valsartan (ENTRESTO) 49-51 mg per tablet Take 1 tablet by mouth 2 (two) times daily. 60 tablet 11    sodium chloride 0.9% SolP 100 mL with iron sucrose 100 mg iron/5 mL Soln 100 mg 50 mg.       No current facility-administered medications for this visit.       PMH:  Past Medical History:   Diagnosis Date    Anemia     Bronchitis 03/01/2017    Cancer 2016    kidney cancer    CHF (congestive heart failure), NYHA class II, chronic, systolic     CMV (cytomegalovirus) antibody positive     Essential hypertension 9/23/2015    H/O herpes simplex type 2 infection     Herpes simplex type 1 antibody positive     History of kidney cancer     s/p left nephrectomy 1/2016    Hyperparathyroidism, unspecified     Hyperuricemia without signs of inflammatory arthritis and tophaceous disease     Kidney stones     LGSIL (low grade squamous intraepithelial dysplasia)     Myocardiopathy 7/21/2017    Prediabetes     Proteinuria     Renal disorder     Thyroid nodule     Urate nephropathy        PSH:  Past Surgical  History:   Procedure Laterality Date    BREAST CYST EXCISION      COLONOSCOPY N/A 11/12/2015    COLONOSCOPY N/A 3/12/2021    Procedure: COLONOSCOPY;  Surgeon: Brendon Lanier MD;  Location: [facility];  Service: Endoscopy;  Laterality: N/A;  covid test 3/9, labs prior, prep instr mailed -ml    INSERTION OF BIVENTRICULAR IMPLANTABLE CARDIOVERTER-DEFIBRILLATOR (ICD)  04/2021    NEPHRECTOMY-LAPAROSCOPIC Left 01/12/2016    PERITONEAL CATHETER INSERTION      Permacath insertion  01/12/2017    SALPINGOOPHORECTOMY Right 2016    KJB---DAVINCI    TONSILLECTOMY      TUBAL LIGATION         FamHx:  Family History   Problem Relation Age of Onset    Hypertension Mother     Diabetes Father     Kidney disease Father     No Known Problems Son     No Known Problems Son     Diabetes Maternal Grandfather     No Known Problems Sister     No Known Problems Brother     No Known Problems Maternal Grandmother     No Known Problems Paternal Grandmother     No Known Problems Paternal Grandfather     Heart disease Sister     No Known Problems Sister     No Known Problems Brother     No Known Problems Maternal Aunt     No Known Problems Maternal Uncle     No Known Problems Paternal Aunt     No Known Problems Paternal Uncle     Breast cancer Neg Hx     Colon cancer Neg Hx     Cancer Neg Hx     Stroke Neg Hx     Amblyopia Neg Hx     Blindness Neg Hx     Cataracts Neg Hx     Glaucoma Neg Hx     Macular degeneration Neg Hx     Retinal detachment Neg Hx     Strabismus Neg Hx     Thyroid disease Neg Hx        SocHx:  Social History     Socioeconomic History    Marital status:     Number of children: 2   Occupational History    Occupation: lucero     Employer: [id number]   Tobacco Use    Smoking status: Never    Smokeless tobacco: Never   Substance and Sexual Activity    Alcohol use: No     Comment: . 2 children. works at [employer].    Drug use: No    Sexual activity: Yes     Partners: Male     Social Determinants of Health      Financial Resource Strain: Low Risk     Difficulty of Paying Living Expenses: Not hard at all   Food Insecurity: No Food Insecurity    Worried About Running Out of Food in the Last Year: Never true    Ran Out of Food in the Last Year: Never true   Transportation Needs: No Transportation Needs    Lack of Transportation (Medical): No    Lack of Transportation (Non-Medical): No   Physical Activity: Insufficiently Active    Days of Exercise per Week: 3 days    Minutes of Exercise per Session: 30 min   Stress: No Stress Concern Present    Feeling of Stress : Not at all   Social Connections: Socially Integrated    Frequency of Communication with Friends and Family: More than three times a week    Frequency of Social Gatherings with Friends and Family: Once a week    Attends Oriental orthodox Services: 1 to 4 times per year    Active Member of Clubs or Organizations: Yes    Attends Club or Organization Meetings: 1 to 4 times per year    Marital Status:    Housing Stability: Low Risk     Unable to Pay for Housing in the Last Year: No    Number of Places Lived in the Last Year: 1    Unstable Housing in the Last Year: No         Objective:     Vitals:    11/30/22 1014   BP: 109/66   Pulse: 76   Temp: 97.9 °F (36.6 °C)     Physical Exam  Vitals and nursing note reviewed.   Constitutional:       General: She is not in acute distress.     Appearance: She is well-developed.      Comments: Thin appearance   HENT:      Head: Normocephalic and atraumatic.      Nose: Nose normal.      Mouth/Throat:      Pharynx: No oropharyngeal exudate.   Eyes:      General:         Right eye: No discharge.         Left eye: No discharge.      Conjunctiva/sclera: Conjunctivae normal.      Pupils: Pupils are equal, round, and reactive to light.   Neck:      Trachea: No tracheal deviation.   Cardiovascular:      Rate and Rhythm: Normal rate and regular rhythm.      Pulses: Normal pulses.      Heart sounds: Murmur heard.      Comments: No swelling  to bilateral lower extremities.   Pulmonary:      Effort: Pulmonary effort is normal.      Breath sounds: Normal breath sounds. No wheezing or rales.   Abdominal:      General: Bowel sounds are normal. There is no distension.      Palpations: Abdomen is soft.      Tenderness: There is no abdominal tenderness. There is no guarding.   Musculoskeletal:         General: No tenderness. Normal range of motion.      Cervical back: Normal range of motion and neck supple.      Comments:      Lymphadenopathy:      Cervical: No cervical adenopathy.   Skin:     General: Skin is warm and dry.      Coloration: Skin is not pale.      Findings: No erythema or rash.   Neurological:      Mental Status: She is alert and oriented to person, place, and time.   Psychiatric:         Behavior: Behavior normal.         Thought Content: Thought content normal.      LABS:  WBC   Date Value Ref Range Status   11/30/2022 13.21 (H) 3.90 - 12.70 K/uL Final     Hemoglobin   Date Value Ref Range Status   11/30/2022 10.2 (L) 12.0 - 16.0 g/dL Final     Hematocrit   Date Value Ref Range Status   11/30/2022 34.8 (L) 37.0 - 48.5 % Final     Platelets   Date Value Ref Range Status   11/30/2022 383 150 - 450 K/uL Final       Chemistry        Component Value Date/Time     11/30/2022 0913    K 3.4 (L) 11/30/2022 0913    CL 99 11/30/2022 0913    CO2 28 11/30/2022 0913    BUN 34 (H) 11/30/2022 0913    CREATININE 5.1 (H) 11/30/2022 0913    GLU 92 11/30/2022 0913        Component Value Date/Time    CALCIUM 9.0 11/30/2022 0913    ALKPHOS 303 (H) 11/30/2022 0913    AST 40 11/30/2022 0913    ALT 26 11/30/2022 0913    BILITOT 1.3 (H) 11/30/2022 0913        CT Chest Abdomen Pelvis Without Contrast (XPD)  Narrative: EXAMINATION:  CT CHEST ABDOMEN PELVIS WITHOUT CONTRAST(XPD)    CLINICAL HISTORY:  restaging;Malignant neoplasm of unspecified kidney, except renal pelvis    TECHNIQUE:  Low dose axial images, sagittal and coronal reformations were obtained from the  thoracic inlet to the pubic symphysis without IV contrast.  The patient received enteric contrast.    COMPARISON:  09/13/2021    FINDINGS:  Thoracic soft tissues: Left chest wall pacemaker battery pack produces extensive streak artifact throughout the lower left chest.    Aorta: Diffusely tortuous with prominent atherosclerosis.    Heart: Markedly enlarged.  Aortic valve and mitral valve calcifications suspected.  CT signs of anemia are present.  Small pericardial effusion suspected.    D[place]/Mediastinum: No significant lymphadenopathy    Lungs: Left basilar compressive atelectasis and dependent subsegmental atelectasis.  Clustered tree-in-bud nodular opacities in the anterior right upper lobe suggesting mucous plugging.  No suspicious pulmonary nodules.  Calcified granuloma in the right lung base.    Images through the abdomen are degraded by patient motion and lack of visceral fat.    Liver: Normal in size and attenuation, with no focal hepatic lesions.    Gallbladder: Multiple gallstones.    Bile Ducts: No evidence of dilated ducts.    Pancreas: Not well delineated, grossly unremarkable.    Spleen: Unremarkable.    Adrenals: Unremarkable.    Kidneys/ Ureters: Left kidney surgically absent.  Right kidney demonstrates diffuse renal parenchymal atrophy and multiple cortical cysts.  No hydronephrosis or nephrolithiasis. No ureteral dilatation.    Bladder: Decompressed    Reproductive organs: Unremarkable.    GI Tract/Mesentery: No evidence of bowel obstruction or inflammation.    Peritoneal Space: Small amount of upper abdominal ascites.  Generalized subcutaneous edema/anasarca.    Retroperitoneum: No significant adenopathy. Limitations of lack of IV contrast and visceral fat noted.    Abdominal wall: Anasarca    Vasculature: Extensive atherosclerosis of the abdominal aorta and all branches.    Bones: No focal suspicious bone lesion.  Impression: Study limited due to lack of visceral fat and IV  contrast.    Postoperative changes of left nephrectomy and findings of chronic medical renal disease.    Marked cardiomegaly, atherosclerosis, and cholelithiasis    Electronically signed by: Gustavo Galaviz Jr  Date:    11/23/2022  Time:    13:54    Assessment:       1. Metastatic renal cell carcinoma, unspecified laterality            Plan:       Metastatic Renal Cell Carcinoma:    It is unlikely that surgery removed all sites of metastatic disease. Given that this is c/w her previous papillary disease, pt started on oral cabozantinib 60mg daily (for dual VEGF and MET inhibition), especially given recent data that shows cabo is far superior to student in the first line setting.  If pt progresses on this regimen, will switch to nivolumab.     Restaging scans show stable disease and pt tolerating cabozantinib very well.  Will closely watch EF given she is on a VEGFi.      TSH mildly elevated, will monitor.     RTC in 2 mos labs at [facility] (CBC,CMP, TSH) and to see Liset on a Monday, Wednesday ONLY (pt has dialysis on Tuesday and Thursday).  Will restage in 4 mos.     Patient is in agreement with the proposed treatment plan. All questions were answered to the patient's satisfaction. Pt knows to call clinic if anything is needed before the next clinic visit.     More than 40 mins total time were spent during this encounter.      Ben Rivera M.D., M.S., F.A.C.P.  Hematology/Oncology Attending  [facility]           Route Chart for Scheduling    Med Onc Chart Routing      Follow up with physician    Follow up with MARTA 2 months. RTC in 2 mos labs at [facility] (CBC,CMP, TSH) and to see Liset on a Monday, Wednesday ONLY (pt has dialysis on Tuesday and Thursday).   Infusion scheduling note    Injection scheduling note    Labs CBC, CMP and TSH   Lab interval:     Imaging    Pharmacy appointment    Other referrals

## 2022-12-06 ENCOUNTER — OFFICE VISIT (OUTPATIENT)
Dept: FAMILY MEDICINE | Facility: CLINIC | Age: 57
End: 2022-12-06
Payer: MEDICARE

## 2022-12-06 VITALS
BODY MASS INDEX: 19.64 KG/M2 | HEIGHT: 64 IN | TEMPERATURE: 98 F | DIASTOLIC BLOOD PRESSURE: 48 MMHG | OXYGEN SATURATION: 99 % | HEART RATE: 77 BPM | WEIGHT: 115.06 LBS | SYSTOLIC BLOOD PRESSURE: 92 MMHG

## 2022-12-06 DIAGNOSIS — Z99.2 ESRD (END STAGE RENAL DISEASE) ON DIALYSIS: ICD-10-CM

## 2022-12-06 DIAGNOSIS — N18.6 ESRD (END STAGE RENAL DISEASE) ON DIALYSIS: ICD-10-CM

## 2022-12-06 DIAGNOSIS — Z95.810 ICD (IMPLANTABLE CARDIOVERTER-DEFIBRILLATOR) IN PLACE: ICD-10-CM

## 2022-12-06 DIAGNOSIS — I48.0 PAF (PAROXYSMAL ATRIAL FIBRILLATION): ICD-10-CM

## 2022-12-06 DIAGNOSIS — R20.8 BURNING SENSATION OF MOUTH: Primary | ICD-10-CM

## 2022-12-06 DIAGNOSIS — R53.81 DEBILITY: ICD-10-CM

## 2022-12-06 DIAGNOSIS — I10 ESSENTIAL HYPERTENSION: ICD-10-CM

## 2022-12-06 PROCEDURE — 99214 OFFICE O/P EST MOD 30 MIN: CPT | Mod: S$PBB,,, | Performed by: NURSE PRACTITIONER

## 2022-12-06 PROCEDURE — 99999 PR PBB SHADOW E&M-EST. PATIENT-LVL V: ICD-10-PCS | Mod: PBBFAC,,, | Performed by: NURSE PRACTITIONER

## 2022-12-06 PROCEDURE — 99215 OFFICE O/P EST HI 40 MIN: CPT | Mod: PBBFAC,PO | Performed by: NURSE PRACTITIONER

## 2022-12-06 PROCEDURE — 99214 PR OFFICE/OUTPT VISIT, EST, LEVL IV, 30-39 MIN: ICD-10-PCS | Mod: S$PBB,,, | Performed by: NURSE PRACTITIONER

## 2022-12-06 PROCEDURE — 99999 PR PBB SHADOW E&M-EST. PATIENT-LVL V: CPT | Mod: PBBFAC,,, | Performed by: NURSE PRACTITIONER

## 2022-12-06 RX ORDER — GABAPENTIN 100 MG/1
100 CAPSULE ORAL DAILY
Qty: 30 CAPSULE | Refills: 11 | Status: SHIPPED | OUTPATIENT
Start: 2022-12-06 | End: 2023-08-25

## 2022-12-06 NOTE — PROGRESS NOTES
Chief Complaint  Chief Complaint   Patient presents with    burning sensation     In mouth        HPI    HPI   Ms. Eva Bloom is a 57 y.o. female with medical problems as listed below. The patient presents to clinic with c/o burning sensation in the mouth (poor historian). She states that it has been going on for a couple weeks. She states the only medication that was different was her metoprolol. She states that everything seems spicy when she eats it. States mouth is a little dry as well. The sensation in her mouth stops her from wanting to eat.     Has not tried any OTC medication.    Labs reviewed. No vitamin deficiency noted.     No other complaints at this time.     PAST MEDICAL HISTORY:  Past Medical History:   Diagnosis Date    Anemia     Bronchitis 03/01/2017    Cancer 2016    kidney cancer    CHF (congestive heart failure), NYHA class II, chronic, systolic     CMV (cytomegalovirus) antibody positive     Essential hypertension 9/23/2015    H/O herpes simplex type 2 infection     Herpes simplex type 1 antibody positive     History of kidney cancer     s/p left nephrectomy 1/2016    Hyperparathyroidism, unspecified     Hyperuricemia without signs of inflammatory arthritis and tophaceous disease     Kidney stones     LGSIL (low grade squamous intraepithelial dysplasia)     Myocardiopathy 7/21/2017    Prediabetes     Proteinuria     Renal disorder     Thyroid nodule     Urate nephropathy        PAST SURGICAL HISTORY:  Past Surgical History:   Procedure Laterality Date    BREAST CYST EXCISION      COLONOSCOPY N/A 11/12/2015    COLONOSCOPY N/A 3/12/2021    Procedure: COLONOSCOPY;  Surgeon: Brendon Lanier MD;  Location: [facility];  Service: Endoscopy;  Laterality: N/A;  covid test 3/9, labs prior, prep instr mailed -ml    INSERTION OF BIVENTRICULAR IMPLANTABLE CARDIOVERTER-DEFIBRILLATOR (ICD)  04/2021    NEPHRECTOMY-LAPAROSCOPIC Left 01/12/2016    PERITONEAL CATHETER INSERTION      Permacath insertion   01/12/2017    SALPINGOOPHORECTOMY Right 2016    KJB---DAVINCI    TONSILLECTOMY      TUBAL LIGATION         SOCIAL HISTORY:  Social History     Socioeconomic History    Marital status:     Number of children: 2   Occupational History    Occupation: lucero     Employer: [id number]   Tobacco Use    Smoking status: Never    Smokeless tobacco: Never   Substance and Sexual Activity    Alcohol use: No     Comment: . 2 children. works at [employer].    Drug use: No    Sexual activity: Yes     Partners: Male     Social Determinants of Health     Financial Resource Strain: Low Risk     Difficulty of Paying Living Expenses: Not hard at all   Food Insecurity: No Food Insecurity    Worried About Running Out of Food in the Last Year: Never true    Ran Out of Food in the Last Year: Never true   Transportation Needs: No Transportation Needs    Lack of Transportation (Medical): No    Lack of Transportation (Non-Medical): No   Physical Activity: Insufficiently Active    Days of Exercise per Week: 3 days    Minutes of Exercise per Session: 30 min   Stress: No Stress Concern Present    Feeling of Stress : Not at all   Social Connections: Socially Integrated    Frequency of Communication with Friends and Family: More than three times a week    Frequency of Social Gatherings with Friends and Family: Once a week    Attends [employer] Services: 1 to 4 times per year    Active Member of Clubs or Organizations: Yes    Attends Club or Organization Meetings: 1 to 4 times per year    Marital Status:    Housing Stability: Low Risk     Unable to Pay for Housing in the Last Year: No    Number of Places Lived in the Last Year: 1    Unstable Housing in the Last Year: No       FAMILY HISTORY:  Family History   Problem Relation Age of Onset    Hypertension Mother     Diabetes Father     Kidney disease Father     No Known Problems Son     No Known Problems Son     Diabetes Maternal Grandfather     No Known Problems Sister      No Known Problems Brother     No Known Problems Maternal Grandmother     No Known Problems Paternal Grandmother     No Known Problems Paternal Grandfather     Heart disease Sister     No Known Problems Sister     No Known Problems Brother     No Known Problems Maternal Aunt     No Known Problems Maternal Uncle     No Known Problems Paternal Aunt     No Known Problems Paternal Uncle     Breast cancer Neg Hx     Colon cancer Neg Hx     Cancer Neg Hx     Stroke Neg Hx     Amblyopia Neg Hx     Blindness Neg Hx     Cataracts Neg Hx     Glaucoma Neg Hx     Macular degeneration Neg Hx     Retinal detachment Neg Hx     Strabismus Neg Hx     Thyroid disease Neg Hx        ALLERGIES AND MEDICATIONS: updated and reviewed.  Review of patient's allergies indicates:   Allergen Reactions    Coreg [carvedilol] Other (See Comments)     Nausea/vomiting    Allopurinol      Other reaction(s): abnormal transaminases     Current Outpatient Medications   Medication Sig Dispense Refill    acetaminophen (TYLENOL) 500 MG tablet Take 500 mg by mouth every 6 (six) hours as needed for Pain.      apixaban (ELIQUIS) 2.5 mg Tab Take 1 tablet (2.5 mg total) by mouth 2 (two) times daily. 60 tablet 11    CABOMETYX 60 mg Tab TAKE ONE TABLET BY MOUTH ONCE DAILY AT THE SAME TIME ON AN EMPTY STOMACH AT LEAST 1 HOUR BEFORE OR 2 HOURS AFTER EATING. AVOID GRAPEFRUIT PRODUCTS 30 tablet 4    cloNIDine 0.3 mg/24 hr td ptwk (CATAPRES) 0.3 mg/24 hr Place 1 patch onto the skin every 7 days. 4 patch 11    epoetin david-epbx (RETACRIT INJ) Epoetin david - epbx (Retacrit)      hydrALAZINE (APRESOLINE) 100 MG tablet Take 1 tablet (100 mg total) by mouth every 12 (twelve) hours. 180 tablet 2    lidocaine-prilocaine (EMLA) cream APPLY ATLEAST 30 MINUTES BEFORE TREATMENT 3 TIMES A WEEK 30 g 11    metoprolol succinate (TOPROL-XL) 100 MG 24 hr tablet Take 1 tablet (100 mg total) by mouth once daily. 90 tablet 3    mv,Ca,min-folic acid-vit K1 (ONE-A-DAY WOMEN'S 50 PLUS)  400-20 mcg Tab Take 1 tablet by mouth.      ondansetron (ZOFRAN-ODT) 8 MG TbDL Take 1 tablet (8 mg total) by mouth every 12 (twelve) hours as needed. 30 tablet 1    oxyCODONE-acetaminophen (PERCOCET) 5-325 mg per tablet Take 1 tablet by mouth every 6 (six) hours as needed for Pain. 30 each 0    promethazine (PHENERGAN) 25 MG tablet Take 1 tablet (25 mg total) by mouth every 6 (six) hours as needed for Nausea. 15 tablet 0    sacubitriL-valsartan (ENTRESTO) 49-51 mg per tablet Take 1 tablet by mouth 2 (two) times daily. 60 tablet 11    sodium chloride 0.9% SolP 100 mL with iron sucrose 100 mg iron/5 mL Soln 100 mg 50 mg.      gabapentin (NEURONTIN) 100 MG capsule Take 1 capsule (100 mg total) by mouth once daily. 30 capsule 11     No current facility-administered medications for this visit.       Patient Care Team:  Sowmya Mccain MD as PCP - General (Internal Medicine)  Ben Rivera MD as Consulting Physician (Hematology and Oncology)  Leora Louis MD (Cardiology)  Williams Hart MD (Inactive) as Consulting Physician (Nephrology)  Lulu Le LPN as Licensed Practical Nurse  Alexandra as Post Acute Care Provider (Dialysis Center)    ROS  Review of Systems   Constitutional:  Negative for chills, fatigue, fever and unexpected weight change.   HENT:  Negative for congestion, ear discharge, ear pain and postnasal drip.         Dry and burning mouth   Eyes:  Negative for photophobia, pain and visual disturbance.   Respiratory:  Negative for cough, shortness of breath and wheezing.    Cardiovascular:  Negative for chest pain, palpitations and leg swelling.   Gastrointestinal:  Negative for abdominal pain, constipation, diarrhea, nausea and vomiting.   Genitourinary:  Negative for dysuria, frequency, urgency and vaginal discharge.   Musculoskeletal:  Negative for back pain, joint swelling and neck stiffness.   Skin:  Negative for rash.   Neurological:  Negative for weakness and headaches.  "  Psychiatric/Behavioral:  Negative for dysphoric mood and sleep disturbance. The patient is not nervous/anxious.          Physical Exam  Vitals:    12/06/22 1126   BP: (!) 92/48   BP Location: Left arm   Patient Position: Sitting   BP Method: Medium (Manual)   Pulse: 77   Temp: 97.6 °F (36.4 °C)   TempSrc: Oral   SpO2: 99%   Weight: 52.2 kg (115 lb 1.3 oz)   Height: 5' 4" (1.626 m)    Body mass index is 19.75 kg/m².  Weight: 52.2 kg (115 lb 1.3 oz)   Height: 5' 4" (162.6 cm)     Physical Exam  Constitutional:       Appearance: She is well-developed.   HENT:      Head:      Salivary Glands: Right salivary gland is not diffusely enlarged or tender. Left salivary gland is not diffusely enlarged or tender.      Right Ear: External ear normal.      Mouth/Throat:      Comments: No lesions or any visual reasons for symptoms   Eyes:      Extraocular Movements: Extraocular movements intact.   Neck:      Thyroid: No thyromegaly.   Cardiovascular:      Rate and Rhythm: Normal rate.   Pulmonary:      Effort: Pulmonary effort is normal.   Abdominal:      Palpations: There is no hepatomegaly or splenomegaly.   Genitourinary:     Vagina: Normal.   Musculoskeletal:         General: Normal range of motion.      Cervical back: Normal range of motion.   Skin:     General: Skin is warm.   Neurological:      Mental Status: She is alert and oriented to person, place, and time.           Health Maintenance         Date Due Completion Date    COVID-19 Vaccine (4 - Booster for Moderna series) 01/06/2022 11/11/2021    Lipid Panel 10/22/2023 10/22/2022    Hemoglobin A1c 10/22/2023 10/22/2022    Cervical Cancer Screening 11/16/2023 11/16/2018    Mammogram 11/16/2023 11/16/2022    TETANUS VACCINE 09/23/2025 9/23/2015    Colorectal Cancer Screening 03/12/2028 3/12/2021    Pneumococcal Vaccines (Age 0-64) (4 - PPSV23 if available, else PCV20) 10/01/2029 11/9/2016          Health maintenance reviewed at this time.   Assessment & Plan  Burning " sensation of mouth  -     gabapentin (NEURONTIN) 100 MG capsule; Take 1 capsule (100 mg total) by mouth once daily.  Dispense: 30 capsule; Refill: 11    OTC bioteen for dry mouth and burning sensation  If no improvement may need to follow up with ENT.     Essential hypertension/PAF (paroxysmal atrial fibrillation)/ICD (implantable cardioverter-defibrillator) in place - SubQ  Monitor BP. BP is low at this time. Patient and family to hold BP medication if BP <100/60.  Patient follow up with cardiology for further evaluation    Debility  Fall precautions discussed.    ESRD (end stage renal disease) on dialysis  Stable. Patient goes to dialysis T/TH/Saturday       Follow-up: Follow up if symptoms worsen or fail to improve.

## 2022-12-07 ENCOUNTER — TELEPHONE (OUTPATIENT)
Dept: VASCULAR SURGERY | Facility: CLINIC | Age: 57
End: 2022-12-07
Payer: COMMERCIAL

## 2022-12-07 NOTE — TELEPHONE ENCOUNTER
Contacted patient and  in response to message. Both state pt is not experiencing pain to left arm but was experiencing prolonged bleeding after HD treatment last week. Appt rescheduled to non-dialysis day,  confirmed. Appt letter placed in mail. ----- Message from Kavita Sorto sent at 12/7/2022  8:42 AM CST -----  Regarding: appt on 12/13/22 @ 4pm  Contact: PT @ [phone]  Richa Martinez with [facility] the [facility] called to schedule pt for an appt. Caller states that pt's is experiencing, fistula for dialysis; having pain; excessive bleeding after each treatment. I did schedule pt for next available appt and added to the wait list. Caller is asking to please call the pt for a sooner appt. Thanks.

## 2022-12-12 DIAGNOSIS — I42.8 CARDIOMYOPATHY, NONISCHEMIC: ICD-10-CM

## 2022-12-12 DIAGNOSIS — I51.89 COMBINED SYSTOLIC AND DIASTOLIC CARDIAC DYSFUNCTION: ICD-10-CM

## 2022-12-12 RX ORDER — SACUBITRIL AND VALSARTAN 49; 51 MG/1; MG/1
1 TABLET, FILM COATED ORAL 2 TIMES DAILY
Qty: 60 TABLET | Refills: 11 | Status: CANCELLED | OUTPATIENT
Start: 2022-12-12

## 2022-12-12 NOTE — TELEPHONE ENCOUNTER
----- Message from Adwoa Berumen sent at 12/12/2022 10:17 AM CST -----  Regarding: Patient Call Back/ Med  .Type: Patient Call Back    Who called:  -     What is the request in detail:  states the rx sacubitriL-valsartan (ENTRESTO) 49-51 mg per tablet needs to be sent as 90 day supply please     Can the clinic reply by [facility]? No     Would the patient rather a call back or a response via [facility]? Call     Best call back number: .[phone]    Additional Information:    [facility]/pharmacy #[id number] - [place] - [address]  [address] [place] [address]  Phone: [phone] Fax: [phone]

## 2022-12-12 NOTE — TELEPHONE ENCOUNTER
Spoke to patient  and he sts that pt insurance isn't going to cover her Sacubitril- Valsartan. The patient med has to be for 90 days in order for it to be covered. Please advise!

## 2022-12-14 NOTE — PROGRESS NOTES
Ms. Bloom is a patient of Dr. Chisholm and was last seen in clinic 7/25/2022.      Subjective:   Patient ID:  Eva Bloom is a 57 y.o. female who presents for follow-up of SubQ ICD and Atrial Fibrillation  .     HPI:    Ms. Bloom is a 57 y.o. female with NICM, HTN, PH, renal cell CA with ESRD on HD, S-ICD (4/2021) here for follow up.    Background:    NICM  HTN on meds  PH  renal cell CA, with subsequent ESRD after nephrectomy. Gets HD via LUE T/R/Sat  WARREN with stairs. No CP, no syncope.  echo 2/21 30%. mod PC effusion (chronic), 2+ MR  PET 2017 neg  NICM  Chronic PC effusion, likely related to ESRD  Will pursue S-ICD, which is a better option for her given young age, ESRD, and lack of pacing indication.  Will likely not perform DFT test. Discussed with pt, who understands and agrees.    4/19/2021: Successful implantation of ICD Sub Q placed for primary intervention.  7/20/2021: She is 3 mo s/p S-ICD implantation. Stable from a device perspective with stable lead and device function. No arrhythmia noted. Home monitor connected per pt.    7/25/2022: Device function stable. She is 1 mo s/p new onset symptomatic AF which converted back to SR after metoprolol. She was also found to be anemic after epistaxis which has resolved s/p cauterization. Did received 1 PRBC in hospital. She was started on eliquis but was unable to tolerate it due to side effects (severe diarrhea which resolved after cessation of medication). I discussed atrial fibrillation and its basic pathophysiology, including the health implications and treatment options with Eva Bloom. Specifically, I addressed the need for CVA prophylaxis as well as the goal to reduce symptomatic arrhythmic episodes by pharmacologic and/or procedural methods. We discussed options for anticoagulation, including coumadin vs dabigatran/rivaroxaban/apixaban. Discussed risks and benefits of each, including dosing, side effects, risk, and cost. Answered all questions.  Patient verbalized understanding.  CHADSVASc 5 and OAC is recommended. Will start warfarin given ESRD. Pt also given information to troubleshoot home monitoring. For now will have patient RTC in 3 mo to determine whether any rhythm control efforts are required.      Update (12/19/2022):    9/14/2022: Patient reported stopping warfarin because it made her feel poorly. She was restarted on eliquis 2.5mg BID.    10/6/2022: Admission to observation for symptomatic anemia. Suspect anemia from blood lost from dialysis access few weeks ago. Outpatient HD unit was planning to increase LUMA. Received 2 units of PRBC overnight hgb  5.7>8.3.    10/29/2022: 57 year old female with ESRD presents to the ED to evaluate her worsening bilateral leg pain and weakness for 5 days.  Patient was hypothermic in ER with lactic acidosis.  Started on Bicarb drip and underwent HD.  Started on broad spectrum ABx's.  Elevated Troponin and Cardiology consulted.  Echo shows EF of 25%.  Denies any chest pain and no plans for intervention at this time.  US abdomen shows possible acute cholecystitis.  Surgery consulted.  HIDA unable to eval gallbladder due to poor hepatic clearance. No indication for cholecystostomy drain as pain has improved per Surgery.  PT/OT consulted.  Recommending Rehab.  SW consulted.  All cultures negative with no obvious source of infection and ABx's stopped.  Patient and  do not want Rehab and want to go home with HH.  She has remained afebrile and hemodynamically stable.  Elevated LFT's on admit have gradually improved.  Patient will be discharged home with HH and DME.  She will follow up with PCP.    Recent weeks she is had increased bleeding after dialysis sticks  Plan: Revision, left AV graft, 12/28/2022  Regional block  Hold Eliquis 2 days prior    Was in hospital yesterday 12/18/2022 for hypotension. Fluid resuscitated.     Today she just was discharged from ED this morning. She was better after fluid  resuscitated but BP is down again in our clinic today. Feels fatigued and lethargic. Patient not very communicative - spoke with  re: history at home. No CP, palps, worsening WARREN. Pt has some positional LH. Continues to have chronic diarrhea.    She is on eliquis 2.5mg BID. (Weight and ESRD). On toprol 100mg daily.    Device check today shows function WNL. Most recent AF episode 7/28/2022. 82% battery longevity.     I have personally reviewed the patient's EKG today, which shows sinus rhythm at 73bpm. GA interval is 180. QRS is 100. QTc is 504.    Relevant Cardiac Test Results:    2D Echo (10/30/2022):  The left ventricle is mildly enlarged with moderate concentric hypertrophy and severely decreased systolic function.  The estimated ejection fraction is 25%.  Grade II left ventricular diastolic dysfunction.  Normal right ventricular size with normal right ventricular systolic function.  Moderate left atrial enlargement.  Moderate right atrial enlargement.  There is mild aortic valve stenosis.  Aortic valve area is 2.11 cm2; peak velocity is 2.11 m/s; mean gradient is 10 mmHg.  Moderate mitral regurgitation.  Moderate tricuspid regurgitation.  Elevated central venous pressure (15 mmHg).  The estimated PA systolic pressure is 60 mmHg.  There is pulmonary hypertension.  Small to moderate circumferential pericardial effusion. No tamponade    Current Outpatient Medications   Medication Sig    acetaminophen (TYLENOL) 500 MG tablet Take 500 mg by mouth every 6 (six) hours as needed for Pain.    apixaban (ELIQUIS) 2.5 mg Tab Take 1 tablet (2.5 mg total) by mouth 2 (two) times daily.    CABOMETYX 60 mg Tab TAKE ONE TABLET BY MOUTH ONCE DAILY AT THE SAME TIME ON AN EMPTY STOMACH AT LEAST 1 HOUR BEFORE OR 2 HOURS AFTER EATING. AVOID GRAPEFRUIT PRODUCTS    cloNIDine 0.3 mg/24 hr td ptwk (CATAPRES) 0.3 mg/24 hr Place 1 patch onto the skin every 7 days.    epoetin david-epbx (RETACRIT INJ) Epoetin david - epbx (Retacrit)     "gabapentin (NEURONTIN) 100 MG capsule Take 1 capsule (100 mg total) by mouth once daily.    hydrALAZINE (APRESOLINE) 100 MG tablet Take 1 tablet (100 mg total) by mouth every 12 (twelve) hours.    lidocaine-prilocaine (EMLA) cream APPLY ATLEAST 30 MINUTES BEFORE TREATMENT 3 TIMES A WEEK    metoprolol succinate (TOPROL-XL) 100 MG 24 hr tablet Take 1 tablet (100 mg total) by mouth once daily.    mv,Ca,min-folic acid-vit K1 (ONE-A-DAY WOMEN'S 50 PLUS) 400-20 mcg Tab Take 1 tablet by mouth.    ondansetron (ZOFRAN-ODT) 8 MG TbDL Take 1 tablet (8 mg total) by mouth every 12 (twelve) hours as needed.    oxyCODONE-acetaminophen (PERCOCET) 5-325 mg per tablet Take 1 tablet by mouth every 6 (six) hours as needed for Pain.    promethazine (PHENERGAN) 25 MG tablet Take 1 tablet (25 mg total) by mouth every 6 (six) hours as needed for Nausea.    sacubitriL-valsartan (ENTRESTO) 49-51 mg per tablet Take 1 tablet by mouth 2 (two) times daily.    sodium chloride 0.9% SolP 100 mL with iron sucrose 100 mg iron/5 mL Soln 100 mg 50 mg.     No current facility-administered medications for this visit.       Review of Systems   Constitutional: Positive for malaise/fatigue.   Cardiovascular:  Negative for chest pain, dyspnea on exertion, irregular heartbeat, leg swelling and palpitations.   Respiratory:  Negative for shortness of breath.    Hematologic/Lymphatic: Negative for bleeding problem.   Skin:  Negative for rash.   Musculoskeletal:  Negative for myalgias.   Gastrointestinal:  Positive for diarrhea. Negative for hematemesis, hematochezia and nausea.   Genitourinary:  Negative for hematuria.   Neurological:  Positive for light-headedness.   Psychiatric/Behavioral:  Negative for altered mental status.    Allergic/Immunologic: Negative for persistent infections.     Objective:          BP 90/70   Pulse 73   Ht 5' 4" (1.626 m)   Wt 48.1 kg (106 lb 0.7 oz)   LMP 10/24/2016   BMI 18.20 kg/m²     Physical Exam  Vitals and nursing note " reviewed.   Constitutional:       Appearance: Normal appearance. She is ill-appearing.   HENT:      Head: Normocephalic.      Nose: Nose normal.   Eyes:      Pupils: Pupils are equal, round, and reactive to light.   Cardiovascular:      Rate and Rhythm: Normal rate and regular rhythm.   Pulmonary:      Effort: No respiratory distress.      Breath sounds: Normal breath sounds.   Musculoskeletal:         General: Normal range of motion.   Skin:     General: Skin is warm and dry.      Findings: No erythema.   Neurological:      Mental Status: She is oriented to person, place, and time. She is lethargic.   Psychiatric:         Speech: Speech normal.         Behavior: Behavior normal.         Lab Results   Component Value Date     12/18/2022    K 3.4 (L) 12/18/2022    MG 2.1 12/18/2022    BUN 45 (H) 12/18/2022    CREATININE 5.0 (H) 12/18/2022    ALT 20 12/18/2022    AST 38 12/18/2022    HGB 8.9 (L) 12/18/2022    HCT 29.7 (L) 12/18/2022    TSH 8.611 (H) 11/30/2022    LDLCALC 46.6 (L) 10/22/2022       Recent Labs   Lab 08/29/22  0809 09/24/22  1151 11/04/22  0532 11/16/22  2136   INR 1.3 H 1.1 1.3 H 1.1         Assessment:     1. PAF (paroxysmal atrial fibrillation)    2. Cardiomyopathy, nonischemic    3. Essential hypertension    4. ESRD (end stage renal disease) on dialysis    5. ICD (implantable cardioverter-defibrillator) in place - SubQ    6. Diarrhea, unspecified type    7. Hypertensive CKD, ESRD on dialysis      Plan:     In summary, Ms. Bloom is a 57 y.o. female with NICM, HTN, PH, renal cell CA with ESRD on HD, S-ICD (4/2021) here for follow up.  Patient was discharged this morning after going to the ED yesterday for hypotension. Was given fluid for dehydration thought secondary to diarrhea. Will refer to GI for diarrhea management. Will also temporarily reduce metoprolol. AV graft revision scheduled for 12/28/2022. On eliquis 2.5mg BID for CVA prophylaxis. ESRD on HD. Will follow up after procedure to  assess for improvement of bleeding. Low AF burden with most recent AF episode 7/2022.    Reduce toprol to 50mg daily for now  GI eval for diarrhea management  AV graft revision scheduled for next week  Continue device reports  RTC 2-3 mo to evaluate bleeding, sooner if needed    *A copy of this note has been sent to Dr. Chisholm*    Follow up in about 2 months (around 2/19/2023).    ------------------------------------------------------------------    ROBERT Aguilar, NP-C  Cardiac Electrophysiology     no

## 2022-12-16 ENCOUNTER — OFFICE VISIT (OUTPATIENT)
Dept: VASCULAR SURGERY | Facility: CLINIC | Age: 57
End: 2022-12-16
Payer: MEDICARE

## 2022-12-16 ENCOUNTER — HOSPITAL ENCOUNTER (OUTPATIENT)
Dept: VASCULAR SURGERY | Facility: CLINIC | Age: 57
Discharge: HOME | End: 2022-12-16
Attending: SURGERY
Payer: MEDICARE

## 2022-12-16 VITALS
BODY MASS INDEX: 17.69 KG/M2 | SYSTOLIC BLOOD PRESSURE: 112 MMHG | HEART RATE: 80 BPM | WEIGHT: 103.63 LBS | DIASTOLIC BLOOD PRESSURE: 58 MMHG | HEIGHT: 64 IN | TEMPERATURE: 98 F

## 2022-12-16 DIAGNOSIS — T82.590D MALFUNCTION OF ARTERIOVENOUS DIALYSIS FISTULA, SUBSEQUENT ENCOUNTER: ICD-10-CM

## 2022-12-16 DIAGNOSIS — N18.6 ESRD (END STAGE RENAL DISEASE) ON DIALYSIS: ICD-10-CM

## 2022-12-16 DIAGNOSIS — N18.6 ESRD (END STAGE RENAL DISEASE) ON DIALYSIS: Primary | ICD-10-CM

## 2022-12-16 DIAGNOSIS — Z99.2 ESRD (END STAGE RENAL DISEASE) ON DIALYSIS: Primary | ICD-10-CM

## 2022-12-16 DIAGNOSIS — Z99.2 ESRD (END STAGE RENAL DISEASE) ON DIALYSIS: ICD-10-CM

## 2022-12-16 PROBLEM — T82.590A MALFUNCTION OF ARTERIOVENOUS DIALYSIS FISTULA: Status: ACTIVE | Noted: 2022-12-16

## 2022-12-16 PROCEDURE — 99215 OFFICE O/P EST HI 40 MIN: CPT | Mod: S$PBB,,, | Performed by: SURGERY

## 2022-12-16 PROCEDURE — 93990 DOPPLER FLOW TESTING: CPT | Mod: 26,S$PBB,, | Performed by: SURGERY

## 2022-12-16 PROCEDURE — 99999 PR PBB SHADOW E&M-EST. PATIENT-LVL IV: CPT | Mod: PBBFAC,,, | Performed by: SURGERY

## 2022-12-16 PROCEDURE — 99999 PR PBB SHADOW E&M-EST. PATIENT-LVL IV: ICD-10-PCS | Mod: PBBFAC,,, | Performed by: SURGERY

## 2022-12-16 PROCEDURE — 93990 PR DUPLEX HEMODIALYSIS ACCESS: ICD-10-PCS | Mod: 26,S$PBB,, | Performed by: SURGERY

## 2022-12-16 PROCEDURE — 99214 OFFICE O/P EST MOD 30 MIN: CPT | Mod: PBBFAC | Performed by: SURGERY

## 2022-12-16 PROCEDURE — 93990 DOPPLER FLOW TESTING: CPT | Mod: PBBFAC | Performed by: SURGERY

## 2022-12-16 PROCEDURE — 99215 PR OFFICE/OUTPT VISIT, EST, LEVL V, 40-54 MIN: ICD-10-PCS | Mod: S$PBB,,, | Performed by: SURGERY

## 2022-12-16 RX ORDER — SODIUM CHLORIDE 9 MG/ML
INJECTION, SOLUTION INTRAVENOUS CONTINUOUS
Status: CANCELLED | OUTPATIENT
Start: 2022-12-16

## 2022-12-16 NOTE — PROGRESS NOTES
VASCULAR SURGERY SERVICE    CHIEF COMPLAINT:  Functional left AV graft dysfunctional left AV graft    HISTORY OF PRESENT ILLNESS: Eva Bloom is a 57 y.o. female end-stage renal disease, with multiple serious medical comorbidities including admission for sepsis 2 months ago, EF 20%, AFib on Eliquis, who is having increasing bleeding after her dialysis runs.  I placed a left upper arm graft January 2017 followed by a covered stent placement of the outflow tract in March 2017.  She has had uninterrupted use of this graft ever since.    Recent weeks she is had increased bleeding after dialysis sticks    Past Medical History:   Diagnosis Date    Anemia     Bronchitis 03/01/2017    Cancer 2016    kidney cancer    CHF (congestive heart failure), NYHA class II, chronic, systolic     CMV (cytomegalovirus) antibody positive     Essential hypertension 9/23/2015    H/O herpes simplex type 2 infection     Herpes simplex type 1 antibody positive     History of kidney cancer     s/p left nephrectomy 1/2016    Hyperparathyroidism, unspecified     Hyperuricemia without signs of inflammatory arthritis and tophaceous disease     Kidney stones     LGSIL (low grade squamous intraepithelial dysplasia)     Myocardiopathy 7/21/2017    Prediabetes     Proteinuria     Renal disorder     Thyroid nodule     Urate nephropathy        Past Surgical History:   Procedure Laterality Date    BREAST CYST EXCISION      COLONOSCOPY N/A 11/12/2015    COLONOSCOPY N/A 3/12/2021    Procedure: COLONOSCOPY;  Surgeon: Brendon Lanier MD;  Location: [facility];  Service: Endoscopy;  Laterality: N/A;  covid test 3/9, labs prior, prep instr mailed -ml    INSERTION OF BIVENTRICULAR IMPLANTABLE CARDIOVERTER-DEFIBRILLATOR (ICD)  04/2021    NEPHRECTOMY-LAPAROSCOPIC Left 01/12/2016    PERITONEAL CATHETER INSERTION      Permacath insertion  01/12/2017    SALPINGOOPHORECTOMY Right 2016    GALLITO---DAVINCI    TONSILLECTOMY      TUBAL LIGATION           Current  Outpatient Medications:     acetaminophen (TYLENOL) 500 MG tablet, Take 500 mg by mouth every 6 (six) hours as needed for Pain., Disp: , Rfl:     apixaban (ELIQUIS) 2.5 mg Tab, Take 1 tablet (2.5 mg total) by mouth 2 (two) times daily., Disp: 60 tablet, Rfl: 11    CABOMETYX 60 mg Tab, TAKE ONE TABLET BY MOUTH ONCE DAILY AT THE SAME TIME ON AN EMPTY STOMACH AT LEAST 1 HOUR BEFORE OR 2 HOURS AFTER EATING. AVOID GRAPEFRUIT PRODUCTS, Disp: 30 tablet, Rfl: 4    cloNIDine 0.3 mg/24 hr td ptwk (CATAPRES) 0.3 mg/24 hr, Place 1 patch onto the skin every 7 days., Disp: 4 patch, Rfl: 11    epoetin david-epbx (RETACRIT INJ), Epoetin david - epbx (Retacrit), Disp: , Rfl:     gabapentin (NEURONTIN) 100 MG capsule, Take 1 capsule (100 mg total) by mouth once daily., Disp: 30 capsule, Rfl: 11    hydrALAZINE (APRESOLINE) 100 MG tablet, Take 1 tablet (100 mg total) by mouth every 12 (twelve) hours., Disp: 180 tablet, Rfl: 2    lidocaine-prilocaine (EMLA) cream, APPLY ATLEAST 30 MINUTES BEFORE TREATMENT 3 TIMES A WEEK, Disp: 30 g, Rfl: 11    metoprolol succinate (TOPROL-XL) 100 MG 24 hr tablet, Take 1 tablet (100 mg total) by mouth once daily., Disp: 90 tablet, Rfl: 3    mv,Ca,min-folic acid-vit K1 (ONE-A-DAY WOMEN'S 50 PLUS) 400-20 mcg Tab, Take 1 tablet by mouth., Disp: , Rfl:     ondansetron (ZOFRAN-ODT) 8 MG TbDL, Take 1 tablet (8 mg total) by mouth every 12 (twelve) hours as needed., Disp: 30 tablet, Rfl: 1    oxyCODONE-acetaminophen (PERCOCET) 5-325 mg per tablet, Take 1 tablet by mouth every 6 (six) hours as needed for Pain., Disp: 30 each, Rfl: 0    promethazine (PHENERGAN) 25 MG tablet, Take 1 tablet (25 mg total) by mouth every 6 (six) hours as needed for Nausea., Disp: 15 tablet, Rfl: 0    sacubitriL-valsartan (ENTRESTO) 49-51 mg per tablet, Take 1 tablet by mouth 2 (two) times daily., Disp: 60 tablet, Rfl: 11    sodium chloride 0.9% SolP 100 mL with iron sucrose 100 mg iron/5 mL Soln 100 mg, 50 mg., Disp: , Rfl:     Review  of patient's allergies indicates:   Allergen Reactions    Coreg [carvedilol] Other (See Comments)     Nausea/vomiting    Allopurinol      Other reaction(s): abnormal transaminases       Family History   Problem Relation Age of Onset    Hypertension Mother     Diabetes Father     Kidney disease Father     No Known Problems Son     No Known Problems Son     Diabetes Maternal Grandfather     No Known Problems Sister     No Known Problems Brother     No Known Problems Maternal Grandmother     No Known Problems Paternal Grandmother     No Known Problems Paternal Grandfather     Heart disease Sister     No Known Problems Sister     No Known Problems Brother     No Known Problems Maternal Aunt     No Known Problems Maternal Uncle     No Known Problems Paternal Aunt     No Known Problems Paternal Uncle     Breast cancer Neg Hx     Colon cancer Neg Hx     Cancer Neg Hx     Stroke Neg Hx     Amblyopia Neg Hx     Blindness Neg Hx     Cataracts Neg Hx     Glaucoma Neg Hx     Macular degeneration Neg Hx     Retinal detachment Neg Hx     Strabismus Neg Hx     Thyroid disease Neg Hx        Social History     Tobacco Use    Smoking status: Never    Smokeless tobacco: Never   Substance Use Topics    Alcohol use: No     Comment: . 2 children. works at [employer].    Drug use: No       REVIEW OF SYSTEMS:  General: No chills, fever, malaise, changes in weight  HEENT: No visual changes, difficulty hearing  Cardiovascular: No chest pain, palpitations, claudication  Pulmonary: No dyspnea, cough, wheezing  Gastrointestinal: No nausea, vomiting, diarrhea, constipation  Genitourinary: No dysuria, low urine output, hematuria  Endocrine: No polydipsia, polyphagia  Hematologic: No fatigue with exertion, pica, pallor  Musculoskeletal: No extremity or joint pain, no back pain, no difficulty with ambulation  Neurologic: no seizures, no headaches, no weakness  Psychiatric: no mood disturbance      PHYSICAL EXAM:   BP (!) 112/58 (BP Location:  "Right arm, Patient Position: Sitting, BP Method: Large (Automatic))   Pulse 80   Temp 98.3 °F (36.8 °C) (Oral)   Ht 5' 4" (1.626 m)   Wt 47 kg (103 lb 9.9 oz)   LMP 10/24/2016   BMI 17.79 kg/m²   Constitutional:  Alert,   Well-appearing  In no distress.   Neurological: Normal speech  no focal findings  CN II - XII grossly intact.    Psychiatric: Mood and affect appropriate and symmetric.   HEENT: Normocephalic / atraumatic  PERRLA  Midline trachea  No scars across the neck   Cardiac: Regular rate and rhythm.   Pulmonary: Normal pulmonary effort.   Abdomen: Soft, not distended.     Skin: Warm and well perfused.    Vascular:  Radial pulses are nonpalpable bilaterally.   Extremities/  Musculoskeletal: No edema.   Left arm demonstrates an upper arm prosthetic graft with moderate aneurysmal degeneration in 2 places.  There has been progressive skin thinning since I saw her 3 months ago     IMAGING:  Duplex exam revealed a mid graft stenosis with a velocity of 496.  Flow volume is robust at 2 L    IMPRESSION:  Aneurysmal degeneration now with skin thinning, left upper arm AV graft, associated mid graft stenosis.  Best option for her is a Accu Seal body revision    PLAN:  Revision, left AV graft, 12/28/2022  Regional block  Hold Eliquis 2 days prior    I have explained the risks, benefits and alternatives for this procedure in detail.  The patient voices understanding and all questions have be answered, and agrees to proceed with the procedure.     MANPREET Muñoz III, MD, FACS  Professor and Chief, Vascular and Endovascular Surgery      "

## 2022-12-16 NOTE — H&P (VIEW-ONLY)
VASCULAR SURGERY SERVICE    CHIEF COMPLAINT:  Functional left AV graft dysfunctional left AV graft    HISTORY OF PRESENT ILLNESS: Eva Bloom is a 57 y.o. female end-stage renal disease, with multiple serious medical comorbidities including admission for sepsis 2 months ago, EF 20%, AFib on Eliquis, who is having increasing bleeding after her dialysis runs.  I placed a left upper arm graft January 2017 followed by a covered stent placement of the outflow tract in March 2017.  She has had uninterrupted use of this graft ever since.    Recent weeks she is had increased bleeding after dialysis sticks    Past Medical History:   Diagnosis Date    Anemia     Bronchitis 03/01/2017    Cancer 2016    kidney cancer    CHF (congestive heart failure), NYHA class II, chronic, systolic     CMV (cytomegalovirus) antibody positive     Essential hypertension 9/23/2015    H/O herpes simplex type 2 infection     Herpes simplex type 1 antibody positive     History of kidney cancer     s/p left nephrectomy 1/2016    Hyperparathyroidism, unspecified     Hyperuricemia without signs of inflammatory arthritis and tophaceous disease     Kidney stones     LGSIL (low grade squamous intraepithelial dysplasia)     Myocardiopathy 7/21/2017    Prediabetes     Proteinuria     Renal disorder     Thyroid nodule     Urate nephropathy        Past Surgical History:   Procedure Laterality Date    BREAST CYST EXCISION      COLONOSCOPY N/A 11/12/2015    COLONOSCOPY N/A 3/12/2021    Procedure: COLONOSCOPY;  Surgeon: Brendon Lanier MD;  Location: [facility];  Service: Endoscopy;  Laterality: N/A;  covid test 3/9, labs prior, prep instr mailed -ml    INSERTION OF BIVENTRICULAR IMPLANTABLE CARDIOVERTER-DEFIBRILLATOR (ICD)  04/2021    NEPHRECTOMY-LAPAROSCOPIC Left 01/12/2016    PERITONEAL CATHETER INSERTION      Permacath insertion  01/12/2017    SALPINGOOPHORECTOMY Right 2016    GALLITO---DAVINCI    TONSILLECTOMY      TUBAL LIGATION           Current  Outpatient Medications:     acetaminophen (TYLENOL) 500 MG tablet, Take 500 mg by mouth every 6 (six) hours as needed for Pain., Disp: , Rfl:     apixaban (ELIQUIS) 2.5 mg Tab, Take 1 tablet (2.5 mg total) by mouth 2 (two) times daily., Disp: 60 tablet, Rfl: 11    CABOMETYX 60 mg Tab, TAKE ONE TABLET BY MOUTH ONCE DAILY AT THE SAME TIME ON AN EMPTY STOMACH AT LEAST 1 HOUR BEFORE OR 2 HOURS AFTER EATING. AVOID GRAPEFRUIT PRODUCTS, Disp: 30 tablet, Rfl: 4    cloNIDine 0.3 mg/24 hr td ptwk (CATAPRES) 0.3 mg/24 hr, Place 1 patch onto the skin every 7 days., Disp: 4 patch, Rfl: 11    epoetin david-epbx (RETACRIT INJ), Epoetin david - epbx (Retacrit), Disp: , Rfl:     gabapentin (NEURONTIN) 100 MG capsule, Take 1 capsule (100 mg total) by mouth once daily., Disp: 30 capsule, Rfl: 11    hydrALAZINE (APRESOLINE) 100 MG tablet, Take 1 tablet (100 mg total) by mouth every 12 (twelve) hours., Disp: 180 tablet, Rfl: 2    lidocaine-prilocaine (EMLA) cream, APPLY ATLEAST 30 MINUTES BEFORE TREATMENT 3 TIMES A WEEK, Disp: 30 g, Rfl: 11    metoprolol succinate (TOPROL-XL) 100 MG 24 hr tablet, Take 1 tablet (100 mg total) by mouth once daily., Disp: 90 tablet, Rfl: 3    mv,Ca,min-folic acid-vit K1 (ONE-A-DAY WOMEN'S 50 PLUS) 400-20 mcg Tab, Take 1 tablet by mouth., Disp: , Rfl:     ondansetron (ZOFRAN-ODT) 8 MG TbDL, Take 1 tablet (8 mg total) by mouth every 12 (twelve) hours as needed., Disp: 30 tablet, Rfl: 1    oxyCODONE-acetaminophen (PERCOCET) 5-325 mg per tablet, Take 1 tablet by mouth every 6 (six) hours as needed for Pain., Disp: 30 each, Rfl: 0    promethazine (PHENERGAN) 25 MG tablet, Take 1 tablet (25 mg total) by mouth every 6 (six) hours as needed for Nausea., Disp: 15 tablet, Rfl: 0    sacubitriL-valsartan (ENTRESTO) 49-51 mg per tablet, Take 1 tablet by mouth 2 (two) times daily., Disp: 60 tablet, Rfl: 11    sodium chloride 0.9% SolP 100 mL with iron sucrose 100 mg iron/5 mL Soln 100 mg, 50 mg., Disp: , Rfl:     Review  of patient's allergies indicates:   Allergen Reactions    Coreg [carvedilol] Other (See Comments)     Nausea/vomiting    Allopurinol      Other reaction(s): abnormal transaminases       Family History   Problem Relation Age of Onset    Hypertension Mother     Diabetes Father     Kidney disease Father     No Known Problems Son     No Known Problems Son     Diabetes Maternal Grandfather     No Known Problems Sister     No Known Problems Brother     No Known Problems Maternal Grandmother     No Known Problems Paternal Grandmother     No Known Problems Paternal Grandfather     Heart disease Sister     No Known Problems Sister     No Known Problems Brother     No Known Problems Maternal Aunt     No Known Problems Maternal Uncle     No Known Problems Paternal Aunt     No Known Problems Paternal Uncle     Breast cancer Neg Hx     Colon cancer Neg Hx     Cancer Neg Hx     Stroke Neg Hx     Amblyopia Neg Hx     Blindness Neg Hx     Cataracts Neg Hx     Glaucoma Neg Hx     Macular degeneration Neg Hx     Retinal detachment Neg Hx     Strabismus Neg Hx     Thyroid disease Neg Hx        Social History     Tobacco Use    Smoking status: Never    Smokeless tobacco: Never   Substance Use Topics    Alcohol use: No     Comment: . 2 children. works at [employer].    Drug use: No       REVIEW OF SYSTEMS:  General: No chills, fever, malaise, changes in weight  HEENT: No visual changes, difficulty hearing  Cardiovascular: No chest pain, palpitations, claudication  Pulmonary: No dyspnea, cough, wheezing  Gastrointestinal: No nausea, vomiting, diarrhea, constipation  Genitourinary: No dysuria, low urine output, hematuria  Endocrine: No polydipsia, polyphagia  Hematologic: No fatigue with exertion, pica, pallor  Musculoskeletal: No extremity or joint pain, no back pain, no difficulty with ambulation  Neurologic: no seizures, no headaches, no weakness  Psychiatric: no mood disturbance      PHYSICAL EXAM:   BP (!) 112/58 (BP Location:  "Right arm, Patient Position: Sitting, BP Method: Large (Automatic))   Pulse 80   Temp 98.3 °F (36.8 °C) (Oral)   Ht 5' 4" (1.626 m)   Wt 47 kg (103 lb 9.9 oz)   LMP 10/24/2016   BMI 17.79 kg/m²   Constitutional:  Alert,   Well-appearing  In no distress.   Neurological: Normal speech  no focal findings  CN II - XII grossly intact.    Psychiatric: Mood and affect appropriate and symmetric.   HEENT: Normocephalic / atraumatic  PERRLA  Midline trachea  No scars across the neck   Cardiac: Regular rate and rhythm.   Pulmonary: Normal pulmonary effort.   Abdomen: Soft, not distended.     Skin: Warm and well perfused.    Vascular:  Radial pulses are nonpalpable bilaterally.   Extremities/  Musculoskeletal: No edema.   Left arm demonstrates an upper arm prosthetic graft with moderate aneurysmal degeneration in 2 places.  There has been progressive skin thinning since I saw her 3 months ago     IMAGING:  Duplex exam revealed a mid graft stenosis with a velocity of 496.  Flow volume is robust at 2 L    IMPRESSION:  Aneurysmal degeneration now with skin thinning, left upper arm AV graft, associated mid graft stenosis.  Best option for her is a Accu Seal body revision    PLAN:  Revision, left AV graft, 12/28/2022  Regional block  Hold Eliquis 2 days prior    I have explained the risks, benefits and alternatives for this procedure in detail.  The patient voices understanding and all questions have be answered, and agrees to proceed with the procedure.     MANPREET Muñoz III, MD, FACS  Professor and Chief, Vascular and Endovascular Surgery      "

## 2022-12-19 ENCOUNTER — HOSPITAL ENCOUNTER (OUTPATIENT)
Facility: HOSPITAL | Age: 57
Discharge: HOME | End: 2022-12-20
Attending: STUDENT IN AN ORGANIZED HEALTH CARE EDUCATION/TRAINING PROGRAM | Admitting: INTERNAL MEDICINE
Payer: MEDICARE

## 2022-12-19 ENCOUNTER — OFFICE VISIT (OUTPATIENT)
Dept: ELECTROPHYSIOLOGY | Facility: CLINIC | Age: 57
End: 2022-12-19
Payer: MEDICARE

## 2022-12-19 ENCOUNTER — TELEPHONE (OUTPATIENT)
Dept: GASTROENTEROLOGY | Facility: CLINIC | Age: 57
End: 2022-12-19
Payer: COMMERCIAL

## 2022-12-19 ENCOUNTER — CLINICAL SUPPORT (OUTPATIENT)
Dept: CARDIOLOGY | Facility: HOSPITAL | Age: 57
End: 2022-12-19
Attending: INTERNAL MEDICINE
Payer: MEDICARE

## 2022-12-19 VITALS
HEART RATE: 73 BPM | DIASTOLIC BLOOD PRESSURE: 70 MMHG | SYSTOLIC BLOOD PRESSURE: 90 MMHG | WEIGHT: 106.06 LBS | HEIGHT: 64 IN | BODY MASS INDEX: 18.11 KG/M2

## 2022-12-19 DIAGNOSIS — Z99.2 HYPERTENSIVE CKD, ESRD ON DIALYSIS: ICD-10-CM

## 2022-12-19 DIAGNOSIS — I10 ESSENTIAL HYPERTENSION: ICD-10-CM

## 2022-12-19 DIAGNOSIS — I48.0 PAF (PAROXYSMAL ATRIAL FIBRILLATION): ICD-10-CM

## 2022-12-19 DIAGNOSIS — I49.8 OTHER SPECIFIED CARDIAC ARRHYTHMIAS: ICD-10-CM

## 2022-12-19 DIAGNOSIS — Z99.2 ESRD (END STAGE RENAL DISEASE) ON DIALYSIS: ICD-10-CM

## 2022-12-19 DIAGNOSIS — I42.8 CARDIOMYOPATHY, NONISCHEMIC: ICD-10-CM

## 2022-12-19 DIAGNOSIS — I48.0 PAF (PAROXYSMAL ATRIAL FIBRILLATION): Primary | ICD-10-CM

## 2022-12-19 DIAGNOSIS — I12.0 HYPERTENSIVE CKD, ESRD ON DIALYSIS: ICD-10-CM

## 2022-12-19 DIAGNOSIS — R53.81 DEBILITY: ICD-10-CM

## 2022-12-19 DIAGNOSIS — R07.9 CHEST PAIN: ICD-10-CM

## 2022-12-19 DIAGNOSIS — R19.7 DIARRHEA, UNSPECIFIED TYPE: ICD-10-CM

## 2022-12-19 DIAGNOSIS — Z95.810 ICD (IMPLANTABLE CARDIOVERTER-DEFIBRILLATOR) IN PLACE: ICD-10-CM

## 2022-12-19 DIAGNOSIS — N18.6 ESRD (END STAGE RENAL DISEASE) ON DIALYSIS: ICD-10-CM

## 2022-12-19 DIAGNOSIS — N18.6 HYPERTENSIVE CKD, ESRD ON DIALYSIS: ICD-10-CM

## 2022-12-19 DIAGNOSIS — A04.72 CLOSTRIDIOIDES DIFFICILE DIARRHEA: ICD-10-CM

## 2022-12-19 DIAGNOSIS — I95.9 HYPOTENSION: ICD-10-CM

## 2022-12-19 DIAGNOSIS — I95.1 ORTHOSTATIC HYPOTENSION: ICD-10-CM

## 2022-12-19 DIAGNOSIS — A49.8 CLOSTRIDIUM DIFFICILE INFECTION: Primary | ICD-10-CM

## 2022-12-19 LAB
ALBUMIN SERPL BCP-MCNC: 1.6 G/DL (ref 3.5–5.2)
ALP SERPL-CCNC: 163 U/L (ref 55–135)
ALT SERPL W/O P-5'-P-CCNC: 27 U/L (ref 10–44)
ANION GAP SERPL CALC-SCNC: 15 MMOL/L (ref 8–16)
AST SERPL-CCNC: 88 U/L (ref 10–40)
BASOPHILS # BLD AUTO: 0.07 K/UL (ref 0–0.2)
BASOPHILS NFR BLD: 0.9 % (ref 0–1.9)
BILIRUB SERPL-MCNC: 0.7 MG/DL (ref 0.1–1)
BUN SERPL-MCNC: 54 MG/DL (ref 6–20)
CALCIUM SERPL-MCNC: 8.3 MG/DL (ref 8.7–10.5)
CHLORIDE SERPL-SCNC: 106 MMOL/L (ref 95–110)
CO2 SERPL-SCNC: 24 MMOL/L (ref 23–29)
CREAT SERPL-MCNC: 6.1 MG/DL (ref 0.5–1.4)
DIFFERENTIAL METHOD: ABNORMAL
EOSINOPHIL # BLD AUTO: 0.3 K/UL (ref 0–0.5)
EOSINOPHIL NFR BLD: 3.2 % (ref 0–8)
ERYTHROCYTE [DISTWIDTH] IN BLOOD BY AUTOMATED COUNT: 21.1 % (ref 11.5–14.5)
EST. GFR  (NO RACE VARIABLE): 7 ML/MIN/1.73 M^2
GLUCOSE SERPL-MCNC: 109 MG/DL (ref 70–110)
HCT VFR BLD AUTO: 31.9 % (ref 37–48.5)
HGB BLD-MCNC: 9.7 G/DL (ref 12–16)
IMM GRANULOCYTES # BLD AUTO: 0.06 K/UL (ref 0–0.04)
IMM GRANULOCYTES NFR BLD AUTO: 0.8 % (ref 0–0.5)
LACTATE SERPL-SCNC: 2.5 MMOL/L (ref 0.5–2.2)
LYMPHOCYTES # BLD AUTO: 1.6 K/UL (ref 1–4.8)
LYMPHOCYTES NFR BLD: 21 % (ref 18–48)
MAGNESIUM SERPL-MCNC: 2.3 MG/DL (ref 1.6–2.6)
MCH RBC QN AUTO: 29.2 PG (ref 27–31)
MCHC RBC AUTO-ENTMCNC: 30.4 G/DL (ref 32–36)
MCV RBC AUTO: 96 FL (ref 82–98)
MONOCYTES # BLD AUTO: 0.6 K/UL (ref 0.3–1)
MONOCYTES NFR BLD: 7.9 % (ref 4–15)
NEUTROPHILS # BLD AUTO: 5.1 K/UL (ref 1.8–7.7)
NEUTROPHILS NFR BLD: 66.2 % (ref 38–73)
NRBC BLD-RTO: 0 /100 WBC
PLATELET # BLD AUTO: 124 K/UL (ref 150–450)
PMV BLD AUTO: 11 FL (ref 9.2–12.9)
POTASSIUM SERPL-SCNC: 4.4 MMOL/L (ref 3.5–5.1)
PROT SERPL-MCNC: 5.6 G/DL (ref 6–8.4)
RBC # BLD AUTO: 3.32 M/UL (ref 4–5.4)
SODIUM SERPL-SCNC: 145 MMOL/L (ref 136–145)
TROPONIN I SERPL DL<=0.01 NG/ML-MCNC: 0.14 NG/ML (ref 0–0.03)
WBC # BLD AUTO: 7.73 K/UL (ref 3.9–12.7)

## 2022-12-19 PROCEDURE — 99999 PR PBB SHADOW E&M-EST. PATIENT-LVL V: ICD-10-PCS | Mod: PBBFAC,,, | Performed by: NURSE PRACTITIONER

## 2022-12-19 PROCEDURE — 93010 RHYTHM STRIP: ICD-10-PCS | Mod: S$PBB,,, | Performed by: INTERNAL MEDICINE

## 2022-12-19 PROCEDURE — 83735 ASSAY OF MAGNESIUM: CPT | Performed by: STUDENT IN AN ORGANIZED HEALTH CARE EDUCATION/TRAINING PROGRAM

## 2022-12-19 PROCEDURE — 99285 EMERGENCY DEPT VISIT HI MDM: CPT

## 2022-12-19 PROCEDURE — 99999 PR PBB SHADOW E&M-EST. PATIENT-LVL V: CPT | Mod: PBBFAC,,, | Performed by: NURSE PRACTITIONER

## 2022-12-19 PROCEDURE — 85025 COMPLETE CBC W/AUTO DIFF WBC: CPT | Performed by: STUDENT IN AN ORGANIZED HEALTH CARE EDUCATION/TRAINING PROGRAM

## 2022-12-19 PROCEDURE — 93282 PRGRMG EVAL IMPLANTABLE DFB: CPT | Mod: 26,,, | Performed by: INTERNAL MEDICINE

## 2022-12-19 PROCEDURE — 80053 COMPREHEN METABOLIC PANEL: CPT | Performed by: STUDENT IN AN ORGANIZED HEALTH CARE EDUCATION/TRAINING PROGRAM

## 2022-12-19 PROCEDURE — 93282 PRGRMG EVAL IMPLANTABLE DFB: CPT

## 2022-12-19 PROCEDURE — 93005 ELECTROCARDIOGRAM TRACING: CPT

## 2022-12-19 PROCEDURE — 93010 ELECTROCARDIOGRAM REPORT: CPT | Mod: S$PBB,,, | Performed by: INTERNAL MEDICINE

## 2022-12-19 PROCEDURE — 99215 OFFICE O/P EST HI 40 MIN: CPT | Mod: PBBFAC,27 | Performed by: NURSE PRACTITIONER

## 2022-12-19 PROCEDURE — 93010 ELECTROCARDIOGRAM REPORT: CPT | Mod: ,,, | Performed by: INTERNAL MEDICINE

## 2022-12-19 PROCEDURE — 99214 PR OFFICE/OUTPT VISIT, EST, LEVL IV, 30-39 MIN: ICD-10-PCS | Mod: S$PBB,,, | Performed by: NURSE PRACTITIONER

## 2022-12-19 PROCEDURE — 99214 OFFICE O/P EST MOD 30 MIN: CPT | Mod: S$PBB,,, | Performed by: NURSE PRACTITIONER

## 2022-12-19 PROCEDURE — 93010 EKG 12-LEAD: ICD-10-PCS | Mod: ,,, | Performed by: INTERNAL MEDICINE

## 2022-12-19 PROCEDURE — 93005 ELECTROCARDIOGRAM TRACING: CPT | Mod: PBBFAC,59 | Performed by: INTERNAL MEDICINE

## 2022-12-19 PROCEDURE — 84484 ASSAY OF TROPONIN QUANT: CPT | Mod: 91 | Performed by: STUDENT IN AN ORGANIZED HEALTH CARE EDUCATION/TRAINING PROGRAM

## 2022-12-19 PROCEDURE — 83605 ASSAY OF LACTIC ACID: CPT | Mod: 91 | Performed by: STUDENT IN AN ORGANIZED HEALTH CARE EDUCATION/TRAINING PROGRAM

## 2022-12-19 PROCEDURE — 93282 CARDIAC DEVICE CHECK - IN CLINIC & HOSPITAL: ICD-10-PCS | Mod: 26,,, | Performed by: INTERNAL MEDICINE

## 2022-12-19 RX ORDER — METOPROLOL SUCCINATE 100 MG/1
TABLET, EXTENDED RELEASE ORAL
Qty: 90 TABLET | Refills: 3 | Status: ON HOLD
Start: 2022-12-19 | End: 2023-11-02 | Stop reason: SDUPTHER

## 2022-12-19 NOTE — TELEPHONE ENCOUNTER
----- Message from Chichi Murphy sent at 12/19/2022  8:22 AM CST -----  MANNIE Berg calling regarding Appointment Access  for Diarrhea or Constipation, call back [phone] or [phone]

## 2022-12-19 NOTE — TELEPHONE ENCOUNTER
Spoke with patients  Otis.   Scheduled to see Dr.James Huerta on 2/7/2023 for 3:30.   Confirmation mailed.   Corina

## 2022-12-20 ENCOUNTER — TELEPHONE (OUTPATIENT)
Dept: SURGERY | Facility: CLINIC | Age: 57
End: 2022-12-20
Payer: COMMERCIAL

## 2022-12-20 VITALS
OXYGEN SATURATION: 100 % | HEIGHT: 64 IN | RESPIRATION RATE: 17 BRPM | HEART RATE: 92 BPM | DIASTOLIC BLOOD PRESSURE: 62 MMHG | TEMPERATURE: 97 F | SYSTOLIC BLOOD PRESSURE: 91 MMHG | BODY MASS INDEX: 18.1 KG/M2 | WEIGHT: 106 LBS

## 2022-12-20 PROBLEM — A49.8 CLOSTRIDIUM DIFFICILE INFECTION: Status: ACTIVE | Noted: 2022-12-20

## 2022-12-20 PROCEDURE — G0378 HOSPITAL OBSERVATION PER HR: HCPCS

## 2022-12-20 PROCEDURE — 25000003 PHARM REV CODE 250

## 2022-12-20 PROCEDURE — 99203 OFFICE O/P NEW LOW 30 MIN: CPT | Mod: ,,, | Performed by: STUDENT IN AN ORGANIZED HEALTH CARE EDUCATION/TRAINING PROGRAM

## 2022-12-20 PROCEDURE — 99203 PR OFFICE/OUTPT VISIT, NEW, LEVL III, 30-44 MIN: ICD-10-PCS | Mod: ,,, | Performed by: STUDENT IN AN ORGANIZED HEALTH CARE EDUCATION/TRAINING PROGRAM

## 2022-12-20 PROCEDURE — 80100016 HC MAINTENANCE HEMODIALYSIS

## 2022-12-20 PROCEDURE — G0257 UNSCHED DIALYSIS ESRD PT HOS: HCPCS

## 2022-12-20 PROCEDURE — 25000003 PHARM REV CODE 250: Performed by: STUDENT IN AN ORGANIZED HEALTH CARE EDUCATION/TRAINING PROGRAM

## 2022-12-20 RX ORDER — SODIUM CHLORIDE 9 MG/ML
INJECTION, SOLUTION INTRAVENOUS ONCE
Status: DISCONTINUED | OUTPATIENT
Start: 2022-12-20 | End: 2022-12-20 | Stop reason: HOSPADM

## 2022-12-20 RX ORDER — TALC
6 POWDER (GRAM) TOPICAL NIGHTLY PRN
Status: DISCONTINUED | OUTPATIENT
Start: 2022-12-20 | End: 2022-12-20 | Stop reason: HOSPADM

## 2022-12-20 RX ORDER — MUPIROCIN 20 MG/G
OINTMENT TOPICAL 2 TIMES DAILY
Status: DISCONTINUED | OUTPATIENT
Start: 2022-12-20 | End: 2022-12-20 | Stop reason: HOSPADM

## 2022-12-20 RX ORDER — IPRATROPIUM BROMIDE AND ALBUTEROL SULFATE 2.5; .5 MG/3ML; MG/3ML
3 SOLUTION RESPIRATORY (INHALATION) EVERY 4 HOURS PRN
Status: DISCONTINUED | OUTPATIENT
Start: 2022-12-20 | End: 2022-12-20 | Stop reason: HOSPADM

## 2022-12-20 RX ORDER — MAG HYDROX/ALUMINUM HYD/SIMETH 200-200-20
30 SUSPENSION, ORAL (FINAL DOSE FORM) ORAL 4 TIMES DAILY PRN
Status: DISCONTINUED | OUTPATIENT
Start: 2022-12-20 | End: 2022-12-20 | Stop reason: HOSPADM

## 2022-12-20 RX ORDER — PROCHLORPERAZINE EDISYLATE 5 MG/ML
5 INJECTION INTRAMUSCULAR; INTRAVENOUS EVERY 6 HOURS PRN
Status: DISCONTINUED | OUTPATIENT
Start: 2022-12-20 | End: 2022-12-20 | Stop reason: HOSPADM

## 2022-12-20 RX ORDER — GABAPENTIN 100 MG/1
100 CAPSULE ORAL DAILY
Status: DISCONTINUED | OUTPATIENT
Start: 2022-12-20 | End: 2022-12-20 | Stop reason: HOSPADM

## 2022-12-20 RX ORDER — NALOXONE HCL 0.4 MG/ML
0.02 VIAL (ML) INJECTION
Status: DISCONTINUED | OUTPATIENT
Start: 2022-12-20 | End: 2022-12-20 | Stop reason: HOSPADM

## 2022-12-20 RX ORDER — ONDANSETRON 8 MG/1
8 TABLET, ORALLY DISINTEGRATING ORAL EVERY 8 HOURS PRN
Status: DISCONTINUED | OUTPATIENT
Start: 2022-12-20 | End: 2022-12-20 | Stop reason: HOSPADM

## 2022-12-20 RX ORDER — ACETAMINOPHEN 325 MG/1
650 TABLET ORAL EVERY 8 HOURS PRN
Status: DISCONTINUED | OUTPATIENT
Start: 2022-12-20 | End: 2022-12-20 | Stop reason: HOSPADM

## 2022-12-20 RX ORDER — VANCOMYCIN HYDROCHLORIDE 125 MG/1
125 CAPSULE ORAL EVERY 6 HOURS
Qty: 40 CAPSULE | Refills: 0 | Status: SHIPPED | OUTPATIENT
Start: 2022-12-21 | End: 2022-12-31

## 2022-12-20 RX ADMIN — Medication 125 MG: at 06:12

## 2022-12-20 RX ADMIN — APIXABAN 2.5 MG: 2.5 TABLET, FILM COATED ORAL at 08:12

## 2022-12-20 RX ADMIN — Medication 125 MG: at 12:12

## 2022-12-20 RX ADMIN — GABAPENTIN 100 MG: 100 CAPSULE ORAL at 08:12

## 2022-12-20 NOTE — SUBJECTIVE & OBJECTIVE
Past Medical History:   Diagnosis Date    Anemia     Bronchitis 03/01/2017    Cancer 2016    kidney cancer    CHF (congestive heart failure), NYHA class II, chronic, systolic     CMV (cytomegalovirus) antibody positive     Essential hypertension 9/23/2015    H/O herpes simplex type 2 infection     Herpes simplex type 1 antibody positive     History of kidney cancer     s/p left nephrectomy 1/2016    Hyperparathyroidism, unspecified     Hyperuricemia without signs of inflammatory arthritis and tophaceous disease     Kidney stones     LGSIL (low grade squamous intraepithelial dysplasia)     Myocardiopathy 7/21/2017    Prediabetes     Proteinuria     Renal disorder     Thyroid nodule     Urate nephropathy        Past Surgical History:   Procedure Laterality Date    BREAST CYST EXCISION      COLONOSCOPY N/A 11/12/2015    COLONOSCOPY N/A 3/12/2021    Procedure: COLONOSCOPY;  Surgeon: Brendon Lanier MD;  Location: [facility];  Service: Endoscopy;  Laterality: N/A;  covid test 3/9, labs prior, prep instr mailed -ml    INSERTION OF BIVENTRICULAR IMPLANTABLE CARDIOVERTER-DEFIBRILLATOR (ICD)  04/2021    NEPHRECTOMY-LAPAROSCOPIC Left 01/12/2016    PERITONEAL CATHETER INSERTION      Permacath insertion  01/12/2017    SALPINGOOPHORECTOMY Right 2016    KJB---DAVINCI    TONSILLECTOMY      TUBAL LIGATION         Review of patient's allergies indicates:   Allergen Reactions    Coreg [carvedilol] Other (See Comments)     Nausea/vomiting    Allopurinol      Other reaction(s): abnormal transaminases       Medications:  (Not in a hospital admission)    Antibiotics (From admission, onward)      Start     Stop Route Frequency Ordered    12/19/22 2315  vancomycin 125 mg/5 mL oral solution 125 mg  (C. difficile Infection (CDI) Treatment Order Panel)         12/29 2359 Oral Every 6 hours 12/19/22 2310          Antifungals (From admission, onward)      None          Antivirals (From admission, onward)      None             Immunization  History   Administered Date(s) Administered    COVID-19, MRNA, LN-S, PF (MODERNA FULL 0.5 ML DOSE) 01/15/2021, 02/11/2021, 11/11/2021    Hepatitis A, Adult 09/23/2015    Hepatitis B, Adult 09/23/2015, 03/12/2016, 07/22/2016, 09/23/2016, 02/14/2019    Influenza 09/06/2011, 10/09/2012, 09/14/2013, 10/09/2015, 09/19/2020    Influenza - Quadrivalent 09/13/2018, 09/24/2019, 09/19/2020    Influenza - Quadrivalent - PF *Preferred* (6 months and older) 09/14/2013, 10/04/2014, 10/09/2015, 10/01/2017, 09/19/2020, 09/30/2021, 10/15/2022    Influenza Split 09/06/2011, 10/09/2012, 10/09/2015    Influenza Whole 10/09/2015    PPD Test 01/14/2016    Pneumococcal Conjugate - 13 Valent 09/23/2015    Pneumococcal Polysaccharide - 23 Valent 03/28/2016, 11/09/2016    Tdap 09/23/2015    Zoster Recombinant 05/17/2021, 07/19/2021       Family History       Problem Relation (Age of Onset)    Diabetes Father, Maternal Grandfather    Heart disease Sister    Hypertension Mother    Kidney disease Father    No Known Problems Son, Son, Sister, Brother, Maternal Grandmother, Paternal Grandmother, Paternal Grandfather, Sister, Brother, Maternal Aunt, Maternal Uncle, Paternal Aunt, Paternal Uncle          Social History     Socioeconomic History    Marital status:     Number of children: 2   Occupational History    Occupation: [employer]     Employer: [id number]   Tobacco Use    Smoking status: Never    Smokeless tobacco: Never   Substance and Sexual Activity    Alcohol use: No     Comment: . 2 children. works at [employer].    Drug use: No    Sexual activity: Yes     Partners: Male     Social Determinants of Health     Financial Resource Strain: Low Risk     Difficulty of Paying Living Expenses: Not hard at all   Food Insecurity: No Food Insecurity    Worried About Running Out of Food in the Last Year: Never true    Ran Out of Food in the Last Year: Never true   Transportation Needs: No Transportation Needs    Lack of Transportation  (Medical): No    Lack of Transportation (Non-Medical): No   Physical Activity: Insufficiently Active    Days of Exercise per Week: 3 days    Minutes of Exercise per Session: 30 min   Stress: No Stress Concern Present    Feeling of Stress : Not at all   Social Connections: Socially Integrated    Frequency of Communication with Friends and Family: More than three times a week    Frequency of Social Gatherings with Friends and Family: Once a week    Attends Sikh Services: 1 to 4 times per year    Active Member of Clubs or Organizations: Yes    Attends Club or Organization Meetings: 1 to 4 times per year    Marital Status:    Housing Stability: Low Risk     Unable to Pay for Housing in the Last Year: No    Number of Places Lived in the Last Year: 1    Unstable Housing in the Last Year: No     Review of Systems   Constitutional:  Negative for chills and fever.   Gastrointestinal:  Positive for diarrhea. Negative for abdominal distention, abdominal pain and constipation.   All other systems reviewed and are negative.  Objective:     Vital Signs (Most Recent):  Temp: 97.5 °F (36.4 °C) (12/19/22 2257)  Pulse: 80 (12/20/22 0723)  Resp: 18 (12/20/22 0723)  BP: 124/70 (12/20/22 0701)  SpO2: 100 % (12/20/22 0723) Vital Signs (24h Range):  Temp:  [97.5 °F (36.4 °C)] 97.5 °F (36.4 °C)  Pulse:  [75-80] 80  Resp:  [12-20] 18  SpO2:  [99 %-100 %] 100 %  BP: ()/(52-80) 124/70     Weight: 48.1 kg (106 lb)  Body mass index is 18.19 kg/m².    Estimated Creatinine Clearance: 7.6 mL/min (A) (based on SCr of 6.1 mg/dL (H)).    Physical Exam  Constitutional:       General: She is not in acute distress.     Appearance: She is not ill-appearing or toxic-appearing.   HENT:      Head: Normocephalic and atraumatic.      Right Ear: External ear normal.      Left Ear: External ear normal.      Nose: Nose normal.   Eyes:      General: No scleral icterus.        Right eye: No discharge.         Left eye: No discharge.    Cardiovascular:      Rate and Rhythm: Normal rate and regular rhythm.   Pulmonary:      Effort: Pulmonary effort is normal. No respiratory distress.      Breath sounds: No stridor. No wheezing or rhonchi.   Abdominal:      General: Abdomen is flat. Bowel sounds are normal. There is no distension.      Palpations: Abdomen is soft.      Tenderness: There is no abdominal tenderness. There is no guarding.   Musculoskeletal:      Right lower leg: No edema.      Left lower leg: No edema.   Skin:     General: Skin is warm and dry.      Coloration: Skin is not jaundiced or pale.      Findings: No bruising.      Comments: LUE/AVF - + thrill    Neurological:      Mental Status: She is alert and oriented to person, place, and time. Mental status is at baseline.      Motor: No weakness.   Psychiatric:         Mood and Affect: Mood normal.         Behavior: Behavior normal.       Significant Labs:   Microbiology Results (last 7 days)       ** No results found for the last 168 hours. **            Significant Imaging: I have reviewed all pertinent imaging results/findings within the past 24 hours.

## 2022-12-20 NOTE — ASSESSMENT & PLAN NOTE
Lab- C diff antigen positive, toxin A and B negative, occult blood positive.  Vancomycin 125mg po qid 10-14days  Consult infectious disease

## 2022-12-20 NOTE — ASSESSMENT & PLAN NOTE
BP controlled in ED with sporadic hypotension, orthostatic positive in ED, hold antihypertensives for now

## 2022-12-20 NOTE — PLAN OF CARE
12/20/22 1023   Discharge Planning   Assessment Type Discharge Planning Brief Assessment   Resource/Environmental Concerns none   Support Systems Spouse/significant other   Equipment Currently Used at Home none   Current Living Arrangements home   Patient/Family Anticipates Transition to home with family   Patient/Family Anticipated Services at Transition none   DME Needed Upon Discharge  none   Discharge Plan A Home with family  (with instructions to keep all scheduled appointments as prior to coming to the hospital)       [id number] - [place] - [address]  [address] [place] [address]  Phone: [phone] Fax: [phone]    [facility] - [place] - [address]  [address] [address]  Phone: [phone] Fax: [phone]    [facility]  [address]  [facility] [address]  Phone: [phone] Fax: [phone]

## 2022-12-20 NOTE — HPI
58 yo female with ESRD on HD and metastatic RCC admitted for Cdiff. ID consulted for abx recs. Pt reported she was seen in ED on 12/18 for hypotension - at that time she noted to have several week history of diarrhea. Cdiff checked at that time which returned positive (antigen/PCR) and pt was notified to come for admission. Of note, pt received abx (zosyn) a couple of months ago. Denied fevers or chills or prior episode of Cdiff. Reported last episode of diarrhea yesterday. Admission course notable for normal WBC and blcx  ngtd. Pt reports tolerating PO intake without issues.Normotensive on evaluation. Pt is currently on PO vancomycin.

## 2022-12-20 NOTE — ASSESSMENT & PLAN NOTE
Patient with Long standing persistent (>12 months) atrial fibrillation which is controlled currently with Beta Blocker. Patient is currently in sinus rhythm.[id number] Score: 1. HASBLED Score: . Anticoagulation indicated. Anticoagulation done with Eliquis.

## 2022-12-20 NOTE — ED NOTES
Pt ambulating around room independently. Updated pt on poc. Pt denies any needs. Will d/c home once  arrives

## 2022-12-20 NOTE — ASSESSMENT & PLAN NOTE
Nephrology consulted for HD, per patient she is a patient of Dr. Childers and receives HD Tuesday, Thursday, Saturday

## 2022-12-20 NOTE — SUBJECTIVE & OBJECTIVE
Past Medical History:   Diagnosis Date    Anemia     Bronchitis 03/01/2017    Cancer 2016    kidney cancer    CHF (congestive heart failure), NYHA class II, chronic, systolic     CMV (cytomegalovirus) antibody positive     Essential hypertension 9/23/2015    H/O herpes simplex type 2 infection     Herpes simplex type 1 antibody positive     History of kidney cancer     s/p left nephrectomy 1/2016    Hyperparathyroidism, unspecified     Hyperuricemia without signs of inflammatory arthritis and tophaceous disease     Kidney stones     LGSIL (low grade squamous intraepithelial dysplasia)     Myocardiopathy 7/21/2017    Prediabetes     Proteinuria     Renal disorder     Thyroid nodule     Urate nephropathy        Past Surgical History:   Procedure Laterality Date    BREAST CYST EXCISION      COLONOSCOPY N/A 11/12/2015    COLONOSCOPY N/A 3/12/2021    Procedure: COLONOSCOPY;  Surgeon: Brendon Lanier MD;  Location: [facility];  Service: Endoscopy;  Laterality: N/A;  covid test 3/9, labs prior, prep instr mailed -ml    INSERTION OF BIVENTRICULAR IMPLANTABLE CARDIOVERTER-DEFIBRILLATOR (ICD)  04/2021    NEPHRECTOMY-LAPAROSCOPIC Left 01/12/2016    PERITONEAL CATHETER INSERTION      Permacath insertion  01/12/2017    SALPINGOOPHORECTOMY Right 2016    KJB---DAVINCI    TONSILLECTOMY      TUBAL LIGATION         Review of patient's allergies indicates:   Allergen Reactions    Coreg [carvedilol] Other (See Comments)     Nausea/vomiting    Allopurinol      Other reaction(s): abnormal transaminases       Current Facility-Administered Medications on File Prior to Encounter   Medication    [COMPLETED] sodium chloride 0.9% bolus 250 mL 250 mL     Current Outpatient Medications on File Prior to Encounter   Medication Sig    acetaminophen (TYLENOL) 500 MG tablet Take 500 mg by mouth every 6 (six) hours as needed for Pain.    apixaban (ELIQUIS) 2.5 mg Tab Take 1 tablet (2.5 mg total) by mouth 2 (two) times daily.    CABOMETYX 60 mg Tab  TAKE ONE TABLET BY MOUTH ONCE DAILY AT THE SAME TIME ON AN EMPTY STOMACH AT LEAST 1 HOUR BEFORE OR 2 HOURS AFTER EATING. AVOID GRAPEFRUIT PRODUCTS    cloNIDine 0.3 mg/24 hr td ptwk (CATAPRES) 0.3 mg/24 hr Place 1 patch onto the skin every 7 days.    epoetin david-epbx (RETACRIT INJ) Epoetin david - epbx (Retacrit)    gabapentin (NEURONTIN) 100 MG capsule Take 1 capsule (100 mg total) by mouth once daily.    hydrALAZINE (APRESOLINE) 100 MG tablet Take 1 tablet (100 mg total) by mouth every 12 (twelve) hours.    lidocaine-prilocaine (EMLA) cream APPLY ATLEAST 30 MINUTES BEFORE TREATMENT 3 TIMES A WEEK    metoprolol succinate (TOPROL-XL) 100 MG 24 hr tablet Take 1/2 tablet (50mg) once daily    mv,Ca,min-folic acid-vit K1 (ONE-A-DAY WOMEN'S 50 PLUS) 400-20 mcg Tab Take 1 tablet by mouth.    ondansetron (ZOFRAN-ODT) 8 MG TbDL Take 1 tablet (8 mg total) by mouth every 12 (twelve) hours as needed.    oxyCODONE-acetaminophen (PERCOCET) 5-325 mg per tablet Take 1 tablet by mouth every 6 (six) hours as needed for Pain.    promethazine (PHENERGAN) 25 MG tablet Take 1 tablet (25 mg total) by mouth every 6 (six) hours as needed for Nausea.    sacubitriL-valsartan (ENTRESTO) 49-51 mg per tablet Take 1 tablet by mouth 2 (two) times daily.    sodium chloride 0.9% SolP 100 mL with iron sucrose 100 mg iron/5 mL Soln 100 mg 50 mg.    [DISCONTINUED] amLODIPine (NORVASC) 5 MG tablet TAKE 1 TABLET BY MOUTH EVERY DAY     Family History       Problem Relation (Age of Onset)    Diabetes Father, Maternal Grandfather    Heart disease Sister    Hypertension Mother    Kidney disease Father    No Known Problems Son, Son, Sister, Brother, Maternal Grandmother, Paternal Grandmother, Paternal Grandfather, Sister, Brother, Maternal Aunt, Maternal Uncle, Paternal Aunt, Paternal Uncle          Tobacco Use    Smoking status: Never    Smokeless tobacco: Never   Substance and Sexual Activity    Alcohol use: No     Comment: . 2 children. works at  [employer].    Drug use: No    Sexual activity: Yes     Partners: Male     Review of Systems   Constitutional:  Positive for fatigue. Negative for chills and fever.   HENT:  Negative for congestion and rhinorrhea.    Eyes:  Negative for photophobia and visual disturbance.   Respiratory:  Negative for cough and shortness of breath.    Cardiovascular:  Negative for chest pain, palpitations and leg swelling.   Gastrointestinal:  Positive for diarrhea. Negative for abdominal pain, nausea and vomiting.   Genitourinary:  Negative for dysuria, frequency and urgency.   Skin:  Negative for pallor, rash and wound.   Neurological:  Negative for light-headedness and headaches.   Psychiatric/Behavioral:  Negative for confusion and decreased concentration.    Objective:     Vital Signs (Most Recent):  Temp: 97.5 °F (36.4 °C) (12/19/22 2257)  Pulse: 78 (12/20/22 0032)  Resp: 15 (12/20/22 0032)  BP: 137/72 (12/20/22 0032)  SpO2: 100 % (12/20/22 0032) Vital Signs (24h Range):  Temp:  [97.5 °F (36.4 °C)] 97.5 °F (36.4 °C)  Pulse:  [73-80] 78  Resp:  [12-20] 15  SpO2:  [99 %-100 %] 100 %  BP: ()/(52-75) 137/72     Weight: 48.1 kg (106 lb)  Body mass index is 18.19 kg/m².    Physical Exam  Vitals and nursing note reviewed.   Constitutional:       General: She is not in acute distress.     Appearance: She is well-developed. She is not ill-appearing.   HENT:      Head: Normocephalic and atraumatic.      Right Ear: External ear normal.      Left Ear: External ear normal.      Nose: Nose normal.      Mouth/Throat:      Mouth: Mucous membranes are dry.   Eyes:      Conjunctiva/sclera: Conjunctivae normal.      Pupils: Pupils are equal, round, and reactive to light.   Cardiovascular:      Rate and Rhythm: Normal rate and regular rhythm.      Heart sounds: No murmur heard.  Pulmonary:      Effort: Pulmonary effort is normal. No respiratory distress.      Breath sounds: Normal breath sounds. No wheezing.   Abdominal:      General: Bowel  sounds are normal. There is no distension.      Palpations: Abdomen is soft.      Tenderness: There is no abdominal tenderness.      Comments: No palpable hepatomegaly or splenomegaly    Musculoskeletal:         General: No tenderness. Normal range of motion.      Cervical back: Normal range of motion and neck supple.   Skin:     General: Skin is warm and dry.   Neurological:      Mental Status: She is alert and oriented to person, place, and time.   Psychiatric:         Thought Content: Thought content normal.         CRANIAL NERVES     CN III, IV, VI   Pupils are equal, round, and reactive to light.     Significant Labs: All pertinent labs within the past 24 hours have been reviewed.  Recent Lab Results  (Last 5 results in the past 24 hours)        12/19/22  2325   12/19/22  0343   12/19/22  0334   12/19/22  0333   12/19/22  0219        Albumin 1.6               Alkaline Phosphatase 163               ALT 27               ANION GAP 15               AST 88               Baso # 0.07               Basophil % 0.9               BILIRUBIN TOTAL 0.7  Comment: For infants and newborns, interpretation of results should be based  on gestational age, weight and in agreement with clinical  observations.    Premature Infant recommended reference ranges:  Up to 24 hours.............<8.0 mg/dL  Up to 48 hours............<12.0 mg/dL  3-5 days..................<15.0 mg/dL  6-29 days.................<15.0 mg/dL                 BUN 54               C difficile Toxins A+B, EIA       Negative  Comment: Testing not recommended for children <24 months old.         C. diff Antigen       Positive         C. diff PCR       Positive         Calcium 8.3               Chloride 106               CO2 24               Creatinine 6.1               Differential Method Automated               eGFR 7               Eos # 0.3               Eosinophil % 3.2               Glucose 109               Gran # (ANC) 5.1               Gran % 66.2                HEMATOCRIT 31.9               HEMOGLOBIN 9.7               Immature Grans (Abs) 0.06  Comment: Mild elevation in immature granulocytes is non specific and   can be seen in a variety of conditions including stress response,   acute inflammation, trauma and pregnancy. Correlation with other   laboratory and clinical findings is essential.                 Immature Granulocytes 0.8               Lactate, Eros 2.5  Comment: Falsely low lactic acid results can be found in samples   containing >=13.0 mg/dL total bilirubin and/or >=3.5 mg/dL   direct bilirubin.           2.3  Comment: Falsely low lactic acid results can be found in samples   containing >=13.0 mg/dL total bilirubin and/or >=3.5 mg/dL   direct bilirubin.         Lymph # 1.6               Lymph % 21.0               Magnesium 2.3               MCH 29.2               MCHC 30.4               MCV 96               Mono # 0.6               Mono % 7.9               MPV 11.0               nRBC 0               Occult Blood     Positive           Platelets 124               Potassium 4.4  Comment: Specimen markedly hemolyzed               PROTEIN TOTAL 5.6               RBC 3.32               RDW 21.1               Rotavirus     Negative           Sodium 145               Troponin I 0.135  Comment: The reference interval for Troponin I represents the 99th percentile   cutoff   for our facility and is consistent with 3rd generation assay   performance.     0.119  Comment: The reference interval for Troponin I represents the 99th percentile   cutoff   for our facility and is consistent with 3rd generation assay   performance.               Stool WBC     Occ neutrophils seen           WBC 7.73                                      Significant Imaging: I have reviewed all pertinent imaging results/findings within the past 24 hours.  Imaging Results    None

## 2022-12-20 NOTE — ASSESSMENT & PLAN NOTE
Hemoglobin today 9.7 with baseline about 8 to 10.  No signs of active bleeding continue to monitor.

## 2022-12-20 NOTE — CONSULTS
[facility] Emergency Dept  Infectious Disease  Consult Note    Patient Name: Eva Bloom  MRN: [id number]  Admission Date: 12/19/2022  Hospital Length of Stay: 0 days  Attending Physician: To Perez MD  Primary Care Provider: Sowmya Mccain MD     Isolation Status: Special Contact    Patient information was obtained from patient, past medical records, ER records and primary team.      Inpatient consult to Infectious Diseases  Consult performed by: Martha Barnes MD  Consult ordered by: Cyril Sanchez NP        Assessment/Plan:     * Clostridium difficile infection  Initial episode of Cdiff - reported several week history of diarrhea, now stopped. Cdiff antigen/PCR positive. No leukocytosis present and normotensive on my exam without evidence of abdominal pain/distention. Blcx ngtd.     Recommendations:  -continue PO vancomycin 125 mg q6hr x 10d  -discussed treatment/mgmt of Cdiff with patient  -continue isolation precaution per hospital protocol          Thank you for your consult. I will sign off. Please contact us if you have any additional questions. Above d/w primary team.       Martha Barnes MD  Infectious Disease  [facility] Emergency Dept    Subjective:     Principal Problem: Clostridium difficile infection    HPI: 56 yo female with ESRD on HD and metastatic RCC admitted for Cdiff. ID consulted for abx recs. Pt reported she was seen in ED on 12/18 for hypotension - at that time she noted to have several week history of diarrhea. Cdiff checked at that time which returned positive (antigen/PCR) and pt was notified to come for admission. Of note, pt received abx (zosyn) a couple of months ago. Denied fevers or chills or prior episode of Cdiff. Reported last episode of diarrhea yesterday. Admission course notable for normal WBC and blcx  ngtd. Pt reports tolerating PO intake without issues.Normotensive on evaluation. Pt is currently on PO vancomycin.             Past Medical History:    Diagnosis Date    Anemia     Bronchitis 03/01/2017    Cancer 2016    kidney cancer    CHF (congestive heart failure), NYHA class II, chronic, systolic     CMV (cytomegalovirus) antibody positive     Essential hypertension 9/23/2015    H/O herpes simplex type 2 infection     Herpes simplex type 1 antibody positive     History of kidney cancer     s/p left nephrectomy 1/2016    Hyperparathyroidism, unspecified     Hyperuricemia without signs of inflammatory arthritis and tophaceous disease     Kidney stones     LGSIL (low grade squamous intraepithelial dysplasia)     Myocardiopathy 7/21/2017    Prediabetes     Proteinuria     Renal disorder     Thyroid nodule     Urate nephropathy        Past Surgical History:   Procedure Laterality Date    BREAST CYST EXCISION      COLONOSCOPY N/A 11/12/2015    COLONOSCOPY N/A 3/12/2021    Procedure: COLONOSCOPY;  Surgeon: Brendon Lanier MD;  Location: [facility];  Service: Endoscopy;  Laterality: N/A;  covid test 3/9, labs prior, prep instr mailed -ml    INSERTION OF BIVENTRICULAR IMPLANTABLE CARDIOVERTER-DEFIBRILLATOR (ICD)  04/2021    NEPHRECTOMY-LAPAROSCOPIC Left 01/12/2016    PERITONEAL CATHETER INSERTION      Permacath insertion  01/12/2017    SALPINGOOPHORECTOMY Right 2016    KJB---DAVINCI    TONSILLECTOMY      TUBAL LIGATION         Review of patient's allergies indicates:   Allergen Reactions    Coreg [carvedilol] Other (See Comments)     Nausea/vomiting    Allopurinol      Other reaction(s): abnormal transaminases       Medications:  (Not in a hospital admission)    Antibiotics (From admission, onward)      Start     Stop Route Frequency Ordered    12/19/22 2315  vancomycin 125 mg/5 mL oral solution 125 mg  (C. difficile Infection (CDI) Treatment Order Panel)         12/29 2359 Oral Every 6 hours 12/19/22 2310          Antifungals (From admission, onward)      None          Antivirals (From admission, onward)      None              Immunization History   Administered Date(s) Administered    COVID-19, MRNA, LN-S, PF (MODERNA FULL 0.5 ML DOSE) 01/15/2021, 02/11/2021, 11/11/2021    Hepatitis A, Adult 09/23/2015    Hepatitis B, Adult 09/23/2015, 03/12/2016, 07/22/2016, 09/23/2016, 02/14/2019    Influenza 09/06/2011, 10/09/2012, 09/14/2013, 10/09/2015, 09/19/2020    Influenza - Quadrivalent 09/13/2018, 09/24/2019, 09/19/2020    Influenza - Quadrivalent - PF *Preferred* (6 months and older) 09/14/2013, 10/04/2014, 10/09/2015, 10/01/2017, 09/19/2020, 09/30/2021, 10/15/2022    Influenza Split 09/06/2011, 10/09/2012, 10/09/2015    Influenza Whole 10/09/2015    PPD Test 01/14/2016    Pneumococcal Conjugate - 13 Valent 09/23/2015    Pneumococcal Polysaccharide - 23 Valent 03/28/2016, 11/09/2016    Tdap 09/23/2015    Zoster Recombinant 05/17/2021, 07/19/2021       Family History       Problem Relation (Age of Onset)    Diabetes Father, Maternal Grandfather    Heart disease Sister    Hypertension Mother    Kidney disease Father    No Known Problems Son, Son, Sister, Brother, Maternal Grandmother, Paternal Grandmother, Paternal Grandfather, Sister, Brother, Maternal Aunt, Maternal Uncle, Paternal Aunt, Paternal Uncle          Social History     Socioeconomic History    Marital status:     Number of children: 2   Occupational History    Occupation: [employer]     Employer: [id number]   Tobacco Use    Smoking status: Never    Smokeless tobacco: Never   Substance and Sexual Activity    Alcohol use: No     Comment: . 2 children. works at [employer].    Drug use: No    Sexual activity: Yes     Partners: Male     Social Determinants of Health     Financial Resource Strain: Low Risk     Difficulty of Paying Living Expenses: Not hard at all   Food Insecurity: No Food Insecurity    Worried About Running Out of Food in the Last Year: Never true    Ran Out of Food in the Last Year: Never true   Transportation Needs: No  Transportation Needs    Lack of Transportation (Medical): No    Lack of Transportation (Non-Medical): No   Physical Activity: Insufficiently Active    Days of Exercise per Week: 3 days    Minutes of Exercise per Session: 30 min   Stress: No Stress Concern Present    Feeling of Stress : Not at all   Social Connections: Socially Integrated    Frequency of Communication with Friends and Family: More than three times a week    Frequency of Social Gatherings with Friends and Family: Once a week    Attends Shinto Services: 1 to 4 times per year    Active Member of Clubs or Organizations: Yes    Attends Club or Organization Meetings: 1 to 4 times per year    Marital Status:    Housing Stability: Low Risk     Unable to Pay for Housing in the Last Year: No    Number of Places Lived in the Last Year: 1    Unstable Housing in the Last Year: No     Review of Systems   Constitutional:  Negative for chills and fever.   Gastrointestinal:  Positive for diarrhea. Negative for abdominal distention, abdominal pain and constipation.   All other systems reviewed and are negative.  Objective:     Vital Signs (Most Recent):  Temp: 97.5 °F (36.4 °C) (12/19/22 2257)  Pulse: 80 (12/20/22 0723)  Resp: 18 (12/20/22 0723)  BP: 124/70 (12/20/22 0701)  SpO2: 100 % (12/20/22 0723) Vital Signs (24h Range):  Temp:  [97.5 °F (36.4 °C)] 97.5 °F (36.4 °C)  Pulse:  [75-80] 80  Resp:  [12-20] 18  SpO2:  [99 %-100 %] 100 %  BP: ()/(52-80) 124/70     Weight: 48.1 kg (106 lb)  Body mass index is 18.19 kg/m².    Estimated Creatinine Clearance: 7.6 mL/min (A) (based on SCr of 6.1 mg/dL (H)).    Physical Exam  Constitutional:       General: She is not in acute distress.     Appearance: She is not ill-appearing or toxic-appearing.   HENT:      Head: Normocephalic and atraumatic.      Right Ear: External ear normal.      Left Ear: External ear normal.      Nose: Nose normal.   Eyes:      General: No scleral icterus.        Right  eye: No discharge.         Left eye: No discharge.   Cardiovascular:      Rate and Rhythm: Normal rate and regular rhythm.   Pulmonary:      Effort: Pulmonary effort is normal. No respiratory distress.      Breath sounds: No stridor. No wheezing or rhonchi.   Abdominal:      General: Abdomen is flat. Bowel sounds are normal. There is no distension.      Palpations: Abdomen is soft.      Tenderness: There is no abdominal tenderness. There is no guarding.   Musculoskeletal:      Right lower leg: No edema.      Left lower leg: No edema.   Skin:     General: Skin is warm and dry.      Coloration: Skin is not jaundiced or pale.      Findings: No bruising.      Comments: LUE/AVF - + thrill    Neurological:      Mental Status: She is alert and oriented to person, place, and time. Mental status is at baseline.      Motor: No weakness.   Psychiatric:         Mood and Affect: Mood normal.         Behavior: Behavior normal.       Significant Labs:   Microbiology Results (last 7 days)       ** No results found for the last 168 hours. **            Significant Imaging: I have reviewed all pertinent imaging results/findings within the past 24 hours.

## 2022-12-20 NOTE — PROGRESS NOTES
Eva Bloom is a 57 y.o. female patient.    Follow for ESRD, dialysis    Patient seen while on dialysis  No new c/o, comfortable    Scheduled Meds:   sodium chloride 0.9%   Intravenous Once    apixaban  2.5 mg Oral BID    gabapentin  100 mg Oral Daily    mupirocin   Nasal BID    vancomycin  125 mg Oral Q6H       Review of patient's allergies indicates:   Allergen Reactions    Coreg [carvedilol] Other (See Comments)     Nausea/vomiting    Allopurinol      Other reaction(s): abnormal transaminases         Vital Signs Range (Last 24H):  Temp:  [97.5 °F (36.4 °C)]   Pulse:  [75-82]   Resp:  [12-20]   BP: ()/(52-80)   SpO2:  [99 %-100 %]     I & O (Last 24H):No intake or output data in the 24 hours ending 12/20/22 1232        Physical Exam:  General appearance: well developed, well nourished, no distress  Lungs:  clear to auscultation bilaterally and normal respiratory effort  Heart: regular rate and rhythm  Abdomen:  soft, normal bowel sounds  Extremities: edema trace    Laboratory:  I have reviewed all pertinent lab results within the past 24 hours.  CBC:   Recent Labs   Lab 12/19/22  2325   WBC 7.73   RBC 3.32*   HGB 9.7*   HCT 31.9*   *   MCV 96   MCH 29.2   MCHC 30.4*     CMP:   Recent Labs   Lab 12/19/22  2325      CALCIUM 8.3*   ALBUMIN 1.6*   PROT 5.6*      K 4.4   CO2 24      BUN 54*   CREATININE 6.1*   ALKPHOS 163*   ALT 27   AST 88*   BILITOT 0.7         Imp/Plan    ESRD  C. diff  HTN  Chronic combined CHF  Anemia of CKD    Continue present Rx  HD q TTS  We'll follow for dialysis        Trac T Deloris  12/20/2022

## 2022-12-20 NOTE — ED NOTES
Pt sitting up  in bed, respirations even/unlabored. NAD noted. Updated pt and family on poc. Pt denies any needs. Side rails up x2, call bell within reach. Will continue to monitor

## 2022-12-20 NOTE — ASSESSMENT & PLAN NOTE
Most recent echo 10/29/22 with EF of 25%, no complaints of chest pain, shortness breast, no advantageous breath sounds on exam.  Not a candidate for CHF pathway  Daily weights strict I's and O's  Tele monitoring

## 2022-12-20 NOTE — ED NOTES
Pt sitting up in bed, respirations even/unlabored. NAD noted. Updated pt on poc. Pt denies any needs. Side rails upx2, call bell within reach.will continue to monitor,.

## 2022-12-20 NOTE — ASSESSMENT & PLAN NOTE
Initial episode of Cdiff - reported several week history of diarrhea, now stopped. Cdiff antigen/PCR positive. No leukocytosis present and normotensive on my exam without evidence of abdominal pain/distention. Blcx ngtd.     Recommendations:  -continue PO vancomycin 125 mg q6hr x 10d  -discussed treatment/mgmt of Cdiff with patient  -continue isolation precaution per hospital protocol

## 2022-12-20 NOTE — ED NOTES
Report received. Pt lying down, respirations even/unlabored. NAD noted. Updated pt on poc. Pt denies any needs. Side rails up x2, call bell within reach. Will continue to monitor.

## 2022-12-20 NOTE — H&P
[facility]  [facility] Medicine  History & Physical    Patient Name: Eva Bloom  MRN: [id number]  Patient Class: OP- Observation  Admission Date: 12/19/2022  Attending Physician: To Perez MD   Primary Care Provider: Sowmya Mccain MD         Patient information was obtained from patient, relative(s) and ER records.     Subjective:     Principal Problem:Clostridium difficile infection    Chief Complaint:   Chief Complaint   Patient presents with    Recheck     Pt here last night and told to come back in to be admitted, not sure why just states she has some type of infection        HPI: Eva Bloom 57 y.o. female with HTN, CHF, ESRD on dialysis, metastatic renal cell carcinoma presents to the hospital with a chief complaint of diarrhea with associated fatigue and hypotension. Patient explains she was evaluated in this facility last night for her symptoms, improved after treatments in the ED, and was discharged home. She states that she was called tonight and instructed to return to the ED with plans to be admitted secondary to a positive C. diff antigen result. Patient reports that her diarrhea has improved. She denies any other complaints at this time. She recalls that she was admitted to the hospital and treated with antibiotics for sepsis from 10/29 to 11/6. Patient denies any history of previous C. diff infections.  No alleviating or exacerbating factors. Denies fever, chills, facial droop, stuttering, drooling, cough, dysuria, hematuria, abdominal pain, nausea, vomiting, CP, SOB, melena or other associated symptoms.     In the ED patient is afebrile without leukocytosis mild normocytic anemia noted, BUN 54, creatinine 6.1, EGFR 7, calcium 8.3, alk-phos 163, albumin 1.6, AST 88, troponin 0.135, lactate 2.5, C diff PCR positive, C diff antigen positive, occult blood positive, stool with occasional neutrophils, CXR negative, blood, stool, and enterohemorrhagic E coli cultures  pending, interrogation of ICD pending    Results for orders placed during the hospital encounter of 10/29/22  Echo  Interpretation Summary  · The left ventricle is mildly enlarged with moderate concentric hypertrophy and severely decreased systolic function.  · The estimated ejection fraction is 25%.  · Grade II left ventricular diastolic dysfunction.  · Normal right ventricular size with normal right ventricular systolic function.  · Moderate left atrial enlargement.  · Moderate right atrial enlargement.  · There is mild aortic valve stenosis.  · Aortic valve area is 2.11 cm2; peak velocity is 2.11 m/s; mean gradient is 10 mmHg.  · Moderate mitral regurgitation.  · Moderate tricuspid regurgitation.  · Elevated central venous pressure (15 mmHg).  · The estimated PA systolic pressure is 60 mmHg.  · There is pulmonary hypertension.  · Small to moderate circumferential pericardial effusion. No tamponade        Past Medical History:   Diagnosis Date    Anemia     Bronchitis 03/01/2017    Cancer 2016    kidney cancer    CHF (congestive heart failure), NYHA class II, chronic, systolic     CMV (cytomegalovirus) antibody positive     Essential hypertension 9/23/2015    H/O herpes simplex type 2 infection     Herpes simplex type 1 antibody positive     History of kidney cancer     s/p left nephrectomy 1/2016    Hyperparathyroidism, unspecified     Hyperuricemia without signs of inflammatory arthritis and tophaceous disease     Kidney stones     LGSIL (low grade squamous intraepithelial dysplasia)     Myocardiopathy 7/21/2017    Prediabetes     Proteinuria     Renal disorder     Thyroid nodule     Urate nephropathy        Past Surgical History:   Procedure Laterality Date    BREAST CYST EXCISION      COLONOSCOPY N/A 11/12/2015    COLONOSCOPY N/A 3/12/2021    Procedure: COLONOSCOPY;  Surgeon: Brendon Lanier MD;  Location: [facility];  Service: Endoscopy;  Laterality: N/A;  covid test 3/9, labs prior,  prep instr mailed -ml    INSERTION OF BIVENTRICULAR IMPLANTABLE CARDIOVERTER-DEFIBRILLATOR (ICD)  04/2021    NEPHRECTOMY-LAPAROSCOPIC Left 01/12/2016    PERITONEAL CATHETER INSERTION      Permacath insertion  01/12/2017    SALPINGOOPHORECTOMY Right 2016    KJB---DAVINCI    TONSILLECTOMY      TUBAL LIGATION         Review of patient's allergies indicates:   Allergen Reactions    Coreg [carvedilol] Other (See Comments)     Nausea/vomiting    Allopurinol      Other reaction(s): abnormal transaminases       Current Facility-Administered Medications on File Prior to Encounter   Medication    [COMPLETED] sodium chloride 0.9% bolus 250 mL 250 mL     Current Outpatient Medications on File Prior to Encounter   Medication Sig    acetaminophen (TYLENOL) 500 MG tablet Take 500 mg by mouth every 6 (six) hours as needed for Pain.    apixaban (ELIQUIS) 2.5 mg Tab Take 1 tablet (2.5 mg total) by mouth 2 (two) times daily.    CABOMETYX 60 mg Tab TAKE ONE TABLET BY MOUTH ONCE DAILY AT THE SAME TIME ON AN EMPTY STOMACH AT LEAST 1 HOUR BEFORE OR 2 HOURS AFTER EATING. AVOID GRAPEFRUIT PRODUCTS    cloNIDine 0.3 mg/24 hr td ptwk (CATAPRES) 0.3 mg/24 hr Place 1 patch onto the skin every 7 days.    epoetin david-epbx (RETACRIT INJ) Epoetin david - epbx (Retacrit)    gabapentin (NEURONTIN) 100 MG capsule Take 1 capsule (100 mg total) by mouth once daily.    hydrALAZINE (APRESOLINE) 100 MG tablet Take 1 tablet (100 mg total) by mouth every 12 (twelve) hours.    lidocaine-prilocaine (EMLA) cream APPLY ATLEAST 30 MINUTES BEFORE TREATMENT 3 TIMES A WEEK    metoprolol succinate (TOPROL-XL) 100 MG 24 hr tablet Take 1/2 tablet (50mg) once daily    mv,Ca,min-folic acid-vit K1 (ONE-A-DAY WOMEN'S 50 PLUS) 400-20 mcg Tab Take 1 tablet by mouth.    ondansetron (ZOFRAN-ODT) 8 MG TbDL Take 1 tablet (8 mg total) by mouth every 12 (twelve) hours as needed.    oxyCODONE-acetaminophen (PERCOCET) 5-325 mg per tablet Take 1 tablet by mouth  every 6 (six) hours as needed for Pain.    promethazine (PHENERGAN) 25 MG tablet Take 1 tablet (25 mg total) by mouth every 6 (six) hours as needed for Nausea.    sacubitriL-valsartan (ENTRESTO) 49-51 mg per tablet Take 1 tablet by mouth 2 (two) times daily.    sodium chloride 0.9% SolP 100 mL with iron sucrose 100 mg iron/5 mL Soln 100 mg 50 mg.    [DISCONTINUED] amLODIPine (NORVASC) 5 MG tablet TAKE 1 TABLET BY MOUTH EVERY DAY     Family History       Problem Relation (Age of Onset)    Diabetes Father, Maternal Grandfather    Heart disease Sister    Hypertension Mother    Kidney disease Father    No Known Problems Son, Son, Sister, Brother, Maternal Grandmother, Paternal Grandmother, Paternal Grandfather, Sister, Brother, Maternal Aunt, Maternal Uncle, Paternal Aunt, Paternal Uncle          Tobacco Use    Smoking status: Never    Smokeless tobacco: Never   Substance and Sexual Activity    Alcohol use: No     Comment: . 2 children. works at [employer].    Drug use: No    Sexual activity: Yes     Partners: Male     Review of Systems   Constitutional:  Positive for fatigue. Negative for chills and fever.   HENT:  Negative for congestion and rhinorrhea.    Eyes:  Negative for photophobia and visual disturbance.   Respiratory:  Negative for cough and shortness of breath.    Cardiovascular:  Negative for chest pain, palpitations and leg swelling.   Gastrointestinal:  Positive for diarrhea. Negative for abdominal pain, nausea and vomiting.   Genitourinary:  Negative for dysuria, frequency and urgency.   Skin:  Negative for pallor, rash and wound.   Neurological:  Negative for light-headedness and headaches.   Psychiatric/Behavioral:  Negative for confusion and decreased concentration.    Objective:     Vital Signs (Most Recent):  Temp: 97.5 °F (36.4 °C) (12/19/22 2257)  Pulse: 78 (12/20/22 0032)  Resp: 15 (12/20/22 0032)  BP: 137/72 (12/20/22 0032)  SpO2: 100 % (12/20/22 0032) Vital Signs (24h Range):  Temp:   [97.5 °F (36.4 °C)] 97.5 °F (36.4 °C)  Pulse:  [73-80] 78  Resp:  [12-20] 15  SpO2:  [99 %-100 %] 100 %  BP: ()/(52-75) 137/72     Weight: 48.1 kg (106 lb)  Body mass index is 18.19 kg/m².    Physical Exam  Vitals and nursing note reviewed.   Constitutional:       General: She is not in acute distress.     Appearance: She is well-developed. She is not ill-appearing.   HENT:      Head: Normocephalic and atraumatic.      Right Ear: External ear normal.      Left Ear: External ear normal.      Nose: Nose normal.      Mouth/Throat:      Mouth: Mucous membranes are dry.   Eyes:      Conjunctiva/sclera: Conjunctivae normal.      Pupils: Pupils are equal, round, and reactive to light.   Cardiovascular:      Rate and Rhythm: Normal rate and regular rhythm.      Heart sounds: No murmur heard.  Pulmonary:      Effort: Pulmonary effort is normal. No respiratory distress.      Breath sounds: Normal breath sounds. No wheezing.   Abdominal:      General: Bowel sounds are normal. There is no distension.      Palpations: Abdomen is soft.      Tenderness: There is no abdominal tenderness.      Comments: No palpable hepatomegaly or splenomegaly    Musculoskeletal:         General: No tenderness. Normal range of motion.      Cervical back: Normal range of motion and neck supple.   Skin:     General: Skin is warm and dry.   Neurological:      Mental Status: She is alert and oriented to person, place, and time.   Psychiatric:         Thought Content: Thought content normal.         CRANIAL NERVES     CN III, IV, VI   Pupils are equal, round, and reactive to light.     Significant Labs: All pertinent labs within the past 24 hours have been reviewed.  Recent Lab Results  (Last 5 results in the past 24 hours)        12/19/22  2325   12/19/22  0343   12/19/22  0334   12/19/22  0333   12/19/22  0219        Albumin 1.6               Alkaline Phosphatase 163               ALT 27               ANION GAP 15               AST 88                Baso # 0.07               Basophil % 0.9               BILIRUBIN TOTAL 0.7  Comment: For infants and newborns, interpretation of results should be based  on gestational age, weight and in agreement with clinical  observations.    Premature Infant recommended reference ranges:  Up to 24 hours.............<8.0 mg/dL  Up to 48 hours............<12.0 mg/dL  3-5 days..................<15.0 mg/dL  6-29 days.................<15.0 mg/dL                 BUN 54               C difficile Toxins A+B, EIA       Negative  Comment: Testing not recommended for children <24 months old.         C. diff Antigen       Positive         C. diff PCR       Positive         Calcium 8.3               Chloride 106               CO2 24               Creatinine 6.1               Differential Method Automated               eGFR 7               Eos # 0.3               Eosinophil % 3.2               Glucose 109               Gran # (ANC) 5.1               Gran % 66.2               HEMATOCRIT 31.9               HEMOGLOBIN 9.7               Immature Grans (Abs) 0.06  Comment: Mild elevation in immature granulocytes is non specific and   can be seen in a variety of conditions including stress response,   acute inflammation, trauma and pregnancy. Correlation with other   laboratory and clinical findings is essential.                 Immature Granulocytes 0.8               Lactate, Eros 2.5  Comment: Falsely low lactic acid results can be found in samples   containing >=13.0 mg/dL total bilirubin and/or >=3.5 mg/dL   direct bilirubin.           2.3  Comment: Falsely low lactic acid results can be found in samples   containing >=13.0 mg/dL total bilirubin and/or >=3.5 mg/dL   direct bilirubin.         Lymph # 1.6               Lymph % 21.0               Magnesium 2.3               MCH 29.2               MCHC 30.4               MCV 96               Mono # 0.6               Mono % 7.9               MPV 11.0               nRBC 0               Occult  Blood     Positive           Platelets 124               Potassium 4.4  Comment: Specimen markedly hemolyzed               PROTEIN TOTAL 5.6               RBC 3.32               RDW 21.1               Rotavirus     Negative           Sodium 145               Troponin I 0.135  Comment: The reference interval for Troponin I represents the 99th percentile   cutoff   for our facility and is consistent with 3rd generation assay   performance.     0.119  Comment: The reference interval for Troponin I represents the 99th percentile   cutoff   for our facility and is consistent with 3rd generation assay   performance.               Stool WBC     Occ neutrophils seen           WBC 7.73                                      Significant Imaging: I have reviewed all pertinent imaging results/findings within the past 24 hours.  Imaging Results    None           Assessment/Plan:     * Clostridium difficile infection  Lab- C diff antigen positive, toxin A and B negative, occult blood positive.  Vancomycin 125mg po qid 10-14days  Consult infectious disease    PAF (paroxysmal atrial fibrillation)  Patient with Long standing persistent (>12 months) atrial fibrillation which is controlled currently with Beta Blocker. Patient is currently in sinus rhythm.[id number] Score: 1. HASBLED Score: . Anticoagulation indicated. Anticoagulation done with Eliquis.    ICD (implantable cardioverter-defibrillator) in place - SubQ  Stable patient denies any recent firing of pacemaker    Anemia in ESRD (end-stage renal disease)  Hemoglobin today 9.7 with baseline about 8 to 10.  No signs of active bleeding continue to monitor.    Chronic combined systolic and diastolic congestive heart failure  Most recent echo 10/29/22 with EF of 25%, no complaints of chest pain, shortness breast, no advantageous breath sounds on exam.  Not a candidate for CHF pathway  Daily weights strict I's and O's  Tele monitoring      ESRD (end stage renal disease) on  dialysis  Nephrology consulted for HD, per patient she is a patient of Dr. Childers and receives HD Tuesday, Thursday, Saturday    S/p nephrectomy left  Per record review    History of kidney cancer  Per chart review    Essential hypertension  BP controlled in ED with sporadic hypotension, orthostatic positive in ED, hold antihypertensives for now      VTE Risk Mitigation (From admission, onward)         Ordered     apixaban tablet 2.5 mg  2 times daily         12/20/22 0033               As clarification, on 12/20/2022, patient should be admitted for hospital observation services under my care in collaboration with To Perez MD. TED Loredo NP  Department of Hospital Medicine   [facility] [place] - Emergency Dept

## 2022-12-20 NOTE — HPI
Eva Bloom 57 y.o. female with HTN, CHF, ESRD on dialysis, metastatic renal cell carcinoma presents to the hospital with a chief complaint of diarrhea with associated fatigue and hypotension. Patient explains she was evaluated in this facility last night for her symptoms, improved after treatments in the ED, and was discharged home. She states that she was called tonight and instructed to return to the ED with plans to be admitted secondary to a positive C. diff antigen result. Patient reports that her diarrhea has improved. She denies any other complaints at this time. She recalls that she was admitted to the hospital and treated with antibiotics for sepsis from 10/29 to 11/6. Patient denies any history of previous C. diff infections.  No alleviating or exacerbating factors. Denies fever, chills, facial droop, stuttering, drooling, cough, dysuria, hematuria, abdominal pain, nausea, vomiting, CP, SOB, melena or other associated symptoms.     In the ED patient is afebrile without leukocytosis mild normocytic anemia noted, BUN 54, creatinine 6.1, EGFR 7, calcium 8.3, alk-phos 163, albumin 1.6, AST 88, troponin 0.135, lactate 2.5, C diff PCR positive, C diff antigen positive, occult blood positive, stool with occasional neutrophils, CXR negative, blood, stool, and enterohemorrhagic E coli cultures pending, interrogation of ICD pending    Results for orders placed during the hospital encounter of 10/29/22  Echo  Interpretation Summary  · The left ventricle is mildly enlarged with moderate concentric hypertrophy and severely decreased systolic function.  · The estimated ejection fraction is 25%.  · Grade II left ventricular diastolic dysfunction.  · Normal right ventricular size with normal right ventricular systolic function.  · Moderate left atrial enlargement.  · Moderate right atrial enlargement.  · There is mild aortic valve stenosis.  · Aortic valve area is 2.11 cm2; peak velocity is 2.11 m/s; mean gradient  is 10 mmHg.  · Moderate mitral regurgitation.  · Moderate tricuspid regurgitation.  · Elevated central venous pressure (15 mmHg).  · The estimated PA systolic pressure is 60 mmHg.  · There is pulmonary hypertension.  · Small to moderate circumferential pericardial effusion. No tamponade

## 2022-12-20 NOTE — HOSPITAL COURSE
Eva Lopez Walker was placed under observation, after being told to return to the ED for a positive C. Diff infection.    Infectious Disease was consulted who recommended that the patient gets discharged home, and is sent home with Vancomycin 125mg q6h x 10 days. Patient is normally on Tuesday/Thursday/Saturday dialysis, and was due for dialysis today. Nephrology was consulted and ordered dialysis which she received while under observation. Patient tolerated dialysis session well and did not have any complaints or issues.     All findings and plan were explained to the patient. Patient has been instructed and educated to the prescribed oral Vancomycin to ensure the C. Diff infection is treated completely. All questions and concerns were answered. Patient verbalized understanding. Patient is in stable condition to d/c home and has been informed to follow up with her PCP within the next 7-10 days to discuss her observation stay and C. Diff infection.Patient has been educated to return to the ED if she experiences any chest pain, shortness of breath, intractable nausea/vomiting, lightheadness, weakness, or discomfort.

## 2022-12-20 NOTE — PHARMACY MED REC
"Admission Medication History     The home medication history was taken by Mariia Melendez CPhT.      You may go to "Admission" then "Reconcile Home Medications" tabs to review and/or act upon these items.     The home medication list has been updated by the Pharmacy department.   Please read ALL comments highlighted in yellow.   Please address this information as you see fit.    Feel free to contact us if you have any questions or require assistance.      The medications listed below were removed from the home medication list. Please reorder if appropriate:  Patient reports no longer taking the following medication(s):  Zofran-ODT 8 mg tab      Medications listed below were obtained from: Patient/family and Analytic software- [employer]  (Not in a hospital admission)      Potential issues to be addressed PRIOR TO DISCHARGE  Patient reported not taking the following medications: (oxycodone-acetaminophen (Percocet) 5-325 mg tab). These medications remain on the home medication list. Please address accordingly.     Mariia Melendez CPhT.  [phone]                  .        "

## 2022-12-20 NOTE — ED PROVIDER NOTES
Encounter Date: 12/19/2022    SCRIBE #1 NOTE: I, Alo Carroll, am scribing for, and in the presence of,  Calvin Chang MD.     History     Chief Complaint   Patient presents with    Recheck     Pt here last night and told to come back in to be admitted, not sure why just states she has some type of infection     Eva Bloom is a 57 y.o. female with a PMHx of HTN, CHF, ESRD on dialysis, metastatic renal cell carcinoma who presents to the Emergency Department for evaluation of a several week history of diarrhea with associated fatigue and hypotension. Patient explains she was evaluated in this facility last night for her symptoms, improved after treatments in the ED, and was discharged home. She states that she was called tonight and instructed to return to the ED with plans to be admitted secondary to a positive C. diff antigen result. Patient reports that her diarrhea has improved. She denies any other complaints at this time. She recalls that she was admitted to the hospital and treated with antibiotics for sepsis from 10/29 to 11/6. Patient denies any history of previous C. diff infections.       The history is provided by the patient.   Review of patient's allergies indicates:   Allergen Reactions    Coreg [carvedilol] Other (See Comments)     Nausea/vomiting    Allopurinol      Other reaction(s): abnormal transaminases     Past Medical History:   Diagnosis Date    Anemia     Bronchitis 03/01/2017    Cancer 2016    kidney cancer    CHF (congestive heart failure), NYHA class II, chronic, systolic     CMV (cytomegalovirus) antibody positive     Essential hypertension 9/23/2015    H/O herpes simplex type 2 infection     Herpes simplex type 1 antibody positive     History of kidney cancer     s/p left nephrectomy 1/2016    Hyperparathyroidism, unspecified     Hyperuricemia without signs of inflammatory arthritis and tophaceous disease     Kidney stones     LGSIL (low grade squamous intraepithelial  dysplasia)     Myocardiopathy 7/21/2017    Prediabetes     Proteinuria     Renal disorder     Thyroid nodule     Urate nephropathy      Past Surgical History:   Procedure Laterality Date    BREAST CYST EXCISION      COLONOSCOPY N/A 11/12/2015    COLONOSCOPY N/A 3/12/2021    Procedure: COLONOSCOPY;  Surgeon: Brendon Lanier MD;  Location: [facility];  Service: Endoscopy;  Laterality: N/A;  covid test 3/9, labs prior, prep instr mailed -ml    INSERTION OF BIVENTRICULAR IMPLANTABLE CARDIOVERTER-DEFIBRILLATOR (ICD)  04/2021    NEPHRECTOMY-LAPAROSCOPIC Left 01/12/2016    PERITONEAL CATHETER INSERTION      Permacath insertion  01/12/2017    SALPINGOOPHORECTOMY Right 2016    KJB---DAVINCI    TONSILLECTOMY      TUBAL LIGATION       Family History   Problem Relation Age of Onset    Hypertension Mother     Diabetes Father     Kidney disease Father     No Known Problems Son     No Known Problems Son     Diabetes Maternal Grandfather     No Known Problems Sister     No Known Problems Brother     No Known Problems Maternal Grandmother     No Known Problems Paternal Grandmother     No Known Problems Paternal Grandfather     Heart disease Sister     No Known Problems Sister     No Known Problems Brother     No Known Problems Maternal Aunt     No Known Problems Maternal Uncle     No Known Problems Paternal Aunt     No Known Problems Paternal Uncle     Breast cancer Neg Hx     Colon cancer Neg Hx     Cancer Neg Hx     Stroke Neg Hx     Amblyopia Neg Hx     Blindness Neg Hx     Cataracts Neg Hx     Glaucoma Neg Hx     Macular degeneration Neg Hx     Retinal detachment Neg Hx     Strabismus Neg Hx     Thyroid disease Neg Hx      Social History     Tobacco Use    Smoking status: Never    Smokeless tobacco: Never   Substance Use Topics    Alcohol use: No     Comment: . 2 children. works at [employer].    Drug use: No     Review of Systems   Constitutional:  Positive for fatigue. Negative for chills and fever.   HENT:  Negative  for facial swelling and sore throat.    Eyes:  Negative for visual disturbance.   Respiratory:  Negative for cough and shortness of breath.    Cardiovascular:  Negative for chest pain and palpitations.   Gastrointestinal:  Positive for diarrhea. Negative for abdominal pain, nausea and vomiting.   Genitourinary:  Negative for dysuria and hematuria.   Musculoskeletal:  Negative for back pain.   Skin:  Negative for rash.   Neurological:  Negative for weakness and headaches.   Hematological:  Does not bruise/bleed easily.   Psychiatric/Behavioral: Negative.       Physical Exam     Initial Vitals [12/19/22 2257]   BP Pulse Resp Temp SpO2   (!) 90/55 80 14 97.5 °F (36.4 °C) 99 %      MAP       --         Physical Exam    Nursing note and vitals reviewed.  Constitutional: She appears well-developed and well-nourished. She is not diaphoretic. No distress.   HENT:   Head: Normocephalic and atraumatic.   Right Ear: External ear normal.   Left Ear: External ear normal.   Nose: Nose normal.   Eyes: Conjunctivae are normal. No scleral icterus.   Neck: Neck supple. No tracheal deviation present.   Normal range of motion.  Cardiovascular:  Normal rate, regular rhythm, normal heart sounds and intact distal pulses.           Pulmonary/Chest: Breath sounds normal. No respiratory distress.   Abdominal: Abdomen is soft. Bowel sounds are normal. There is no abdominal tenderness.   Musculoskeletal:      Cervical back: Normal range of motion and neck supple.     Neurological: She is alert and oriented to person, place, and time.   Skin: Skin is warm and dry.   Psychiatric: She has a normal mood and affect. Thought content normal.       ED Course   Procedures  Labs Reviewed   CBC W/ AUTO DIFFERENTIAL - Abnormal; Notable for the following components:       Result Value    RBC 3.32 (*)     Hemoglobin 9.7 (*)     Hematocrit 31.9 (*)     MCHC 30.4 (*)     RDW 21.1 (*)     Platelets 124 (*)     Immature Granulocytes 0.8 (*)     Immature Grans  (Abs) 0.06 (*)     All other components within normal limits   COMPREHENSIVE METABOLIC PANEL - Abnormal; Notable for the following components:    BUN 54 (*)     Creatinine 6.1 (*)     Calcium 8.3 (*)     Total Protein 5.6 (*)     Albumin 1.6 (*)     Alkaline Phosphatase 163 (*)     AST 88 (*)     eGFR 7 (*)     All other components within normal limits   LACTIC ACID, PLASMA - Abnormal; Notable for the following components:    Lactate (Lactic Acid) 2.5 (*)     All other components within normal limits   TROPONIN I - Abnormal; Notable for the following components:    Troponin I 0.135 (*)     All other components within normal limits   MAGNESIUM          Imaging Results    None          Medications   vancomycin 125 mg/5 mL oral solution 125 mg (125 mg Oral Given 12/20/22 0009)   sodium chloride 0.9% bolus 250 mL 250 mL (250 mLs Intravenous Not Given 12/19/22 0522)   apixaban tablet 2.5 mg (has no administration in time range)   gabapentin capsule 100 mg (has no administration in time range)     Medical Decision Making:   History:   Old Medical Records: I decided to obtain old medical records.  Independently Interpreted Test(s):   I have ordered and independently interpreted EKG Reading(s) - see prior notes  Clinical Tests:   Lab Tests: Ordered and Reviewed  Medical Tests: Reviewed and Ordered        Scribe Attestation:   Scribe #1: I performed the above scribed service and the documentation accurately describes the services I performed. I attest to the accuracy of the note.      ED Course as of 12/20/22 0034   Mon Dec 19, 2022   2333 EKG:  Rate 79, regular rhythm, sinus rhythm, intervals within normal limits, no ST elevations or depressions noted. [CC]   2335 Patient orthostatic.  88/52 upon standing.  She had lightheadedness associated with standing. [CC]   2336 Patient presenting to the emergency department for evaluation of continued hypotensive episodes at home.  Patient arrives hypotensive with evidence of  orthostatic hypotension on exam.  Patient with a lactic of 2.5.  She has a C diff antigen noted on her previous stool sample with stool leukocytes also noted.  C diff toxins were negative.  I am concerned for volume depletion likely related to patient's bouts of diarrhea notes concern for C diff infection.  Have ordered oral vancomycin.  Albumin 1.6.  Calcium decreased other electrolytes within normal limits.  Troponin likely patient's baseline.  No chest pain.  EKG nonischemic.  Doubt ACS.  No leukocytosis, patient afebrile.  Doubt fulminant pseudomembranous colitis Clostridium difficile infection.  I believe patient would benefit from further fluid resuscitation observation and infectious disease consultation in the morning.  I have spoken with the hospitalist team, Cyril, for placement in observation. [CC]      ED Course User Index  [CC] Calvin Chang MD                   Clinical Impression:   Final diagnoses:  [I95.9] Hypotension  [A04.72] Clostridioides difficile diarrhea (Primary)  [I95.1] Orthostatic hypotension        ED Disposition Condition    Observation              I, Calvin Chang MD, personally performed the services described in this documentation. All medical record entries made by the scribe were at my direction and in my presence. I have reviewed the chart and agree that the record reflects my personal performance and is accurate and complete.       Calvin Chang MD  12/20/22 0034

## 2022-12-20 NOTE — DISCHARGE SUMMARY
[facility]  [place]  Discharge Summary      Patient Name: Eva Bloom  MRN: [id number]  TRENT: [id number]  Patient Class: OP- Observation  Admission Date: 12/19/2022  Hospital Length of Stay: 0 days  Discharge Date and Time:  12/20/2022 4:28 PM  Attending Physician: To Perez MD   Discharging Provider: Aayush Garcia PA-C  Primary Care Provider: Sowmya Mccain MD    Primary Care Team: Networked reference to record PCT     HPI:   Eva Bloom 57 y.o. female with HTN, CHF, ESRD on dialysis, metastatic renal cell carcinoma presents to the hospital with a chief complaint of diarrhea with associated fatigue and hypotension. Patient explains she was evaluated in this facility last night for her symptoms, improved after treatments in the ED, and was discharged home. She states that she was called tonight and instructed to return to the ED with plans to be admitted secondary to a positive C. diff antigen result. Patient reports that her diarrhea has improved. She denies any other complaints at this time. She recalls that she was admitted to the hospital and treated with antibiotics for sepsis from 10/29 to 11/6. Patient denies any history of previous C. diff infections.  No alleviating or exacerbating factors. Denies fever, chills, facial droop, stuttering, drooling, cough, dysuria, hematuria, abdominal pain, nausea, vomiting, CP, SOB, melena or other associated symptoms.     In the ED patient is afebrile without leukocytosis mild normocytic anemia noted, BUN 54, creatinine 6.1, EGFR 7, calcium 8.3, alk-phos 163, albumin 1.6, AST 88, troponin 0.135, lactate 2.5, C diff PCR positive, C diff antigen positive, occult blood positive, stool with occasional neutrophils, CXR negative, blood, stool, and enterohemorrhagic E coli cultures pending, interrogation of ICD pending    Results for orders placed during the hospital encounter of 10/29/22  Echo  Interpretation Summary  · The left  ventricle is mildly enlarged with moderate concentric hypertrophy and severely decreased systolic function.  · The estimated ejection fraction is 25%.  · Grade II left ventricular diastolic dysfunction.  · Normal right ventricular size with normal right ventricular systolic function.  · Moderate left atrial enlargement.  · Moderate right atrial enlargement.  · There is mild aortic valve stenosis.  · Aortic valve area is 2.11 cm2; peak velocity is 2.11 m/s; mean gradient is 10 mmHg.  · Moderate mitral regurgitation.  · Moderate tricuspid regurgitation.  · Elevated central venous pressure (15 mmHg).  · The estimated PA systolic pressure is 60 mmHg.  · There is pulmonary hypertension.  · Small to moderate circumferential pericardial effusion. No tamponade        * No surgery found *      Hospital Course:   Eva Bloom was placed under observation, after being told to return to the ED for a positive C. Diff infection.    Infectious Disease was consulted who recommended that the patient gets discharged home, and is sent home with Vancomycin 125mg q6h x 10 days. Patient is normally on Tuesday/Thursday/Saturday dialysis, and was due for dialysis today. Nephrology was consulted and ordered dialysis which she received while under observation. Patient tolerated dialysis session well and did not have any complaints or issues.     All findings and plan were explained to the patient. Patient has been instructed and educated to the prescribed oral Vancomycin to ensure the C. Diff infection is treated completely. All questions and concerns were answered. Patient verbalized understanding. Patient is in stable condition to d/c home and has been informed to follow up with her PCP within the next 7-10 days to discuss her observation stay and C. Diff infection.Patient has been educated to return to the ED if she experiences any chest pain, shortness of breath, intractable nausea/vomiting, lightheadness, weakness, or discomfort.        Goals of Care Treatment Preferences:  Code Status: Full Code          What is most important right now is to focus on symptom/pain control, extending life as long as possible, even it it means sacrificing quality, curative/life-prolongation (regardless of treatment burdens).  Accordingly, we have decided that the best plan to meet the patient's goals includes continuing with treatment.      Consults:   Consults (From admission, onward)        Status Ordering Provider     Inpatient consult to Nephrology  Once        Provider:  Floyd Motley MD    Completed JOHN HALEY     Inpatient consult to Infectious Diseases  Once        Provider:  Martha Barnes MD    Completed JOHN HALEY          No new Assessment & Plan notes have been filed under this hospital service since the last note was generated.  Service: Hospital Medicine    Final Active Diagnoses:    Diagnosis Date Noted POA    PRINCIPAL PROBLEM:  Clostridium difficile infection [A49.8] 12/20/2022 Yes    PAF (paroxysmal atrial fibrillation) [I48.0] 07/25/2022 Yes    ICD (implantable cardioverter-defibrillator) in place - SubQ [Z95.810] 07/15/2021 Yes    Anemia in ESRD (end-stage renal disease) [N18.6, D63.1]  Yes    Chronic combined systolic and diastolic congestive heart failure [I50.42]  Yes    ESRD (end stage renal disease) on dialysis [N18.6, Z99.2]  Not Applicable    S/p nephrectomy left [Z90.5] 10/07/2016 Not Applicable     Chronic    History of kidney cancer [Z85.528] 09/02/2016 Not Applicable    Essential hypertension [I10] 09/23/2015 Yes      Problems Resolved During this Admission:       Discharged Condition: stable    Disposition: Home or Self Care    Follow Up:    Patient Instructions:      Ambulatory referral/consult to Outpatient Case Management   Referral Priority: Routine Referral Type: Consultation   Referral Reason: Specialty Services Required   Number of Visits Requested: 1     Diet renal     Diet Cardiac      Notify your health care provider if you experience any of the following:  persistent nausea and vomiting or diarrhea     Notify your health care provider if you experience any of the following:  severe uncontrolled pain     Notify your health care provider if you experience any of the following:  persistent dizziness, light-headedness, or visual disturbances     Notify your health care provider if you experience any of the following:  increased confusion or weakness     Activity as tolerated       Significant Diagnostic Studies: Labs:   BMP:   Recent Labs   Lab 12/18/22 2343 12/19/22  2325   * 109    145   K 3.4* 4.4    106   CO2 25 24   BUN 45* 54*   CREATININE 5.0* 6.1*   CALCIUM 8.2* 8.3*   MG 2.1 2.3     CMP   Recent Labs   Lab 12/18/22 2343 12/19/22  2325    145   K 3.4* 4.4    106   CO2 25 24   * 109   BUN 45* 54*   CREATININE 5.0* 6.1*   CALCIUM 8.2* 8.3*   PROT 4.6* 5.6*   ALBUMIN 1.4* 1.6*   BILITOT 0.6 0.7   ALKPHOS 164* 163*   AST 38 88*   ALT 20 27   ANIONGAP 11 15     CBC   Recent Labs   Lab 12/18/22 2343 12/19/22 2325   WBC 7.75 7.73   HGB 8.9* 9.7*   HCT 29.7* 31.9*   * 124*     INR   Lab Results   Component Value Date    INR 1.1 11/16/2022    INR 1.3 (H) 11/04/2022    INR 1.1 09/24/2022     Lipid Panel   Lab Results   Component Value Date    CHOL 101 (L) 10/22/2022    HDL 35 (L) 10/22/2022    LDLCALC 46.6 (L) 10/22/2022    TRIG 97 10/22/2022    CHOLHDL 34.7 10/22/2022     A1C:   Recent Labs   Lab 10/22/22  0953   HGBA1C 4.8     Microbiology:   Blood Culture   Lab Results   Component Value Date    LABBLOO No Growth to date 12/19/2022    LABBLOO No Growth to date 12/19/2022       Pending Diagnostic Studies:     None         Medications:  Reconciled Home Medications:      Medication List      START taking these medications    vancomycin 125 MG capsule  Commonly known as: VANCOCIN  Take 1 capsule (125 mg total) by mouth every 6 (six) hours. for 10  days  Start taking on: December 21, 2022        CONTINUE taking these medications    acetaminophen 500 MG tablet  Commonly known as: TYLENOL  Take 500 mg by mouth every 6 (six) hours as needed for Pain.     apixaban 2.5 mg Tab  Commonly known as: ELIQUIS  Take 1 tablet (2.5 mg total) by mouth 2 (two) times daily.     CABOMETYX 60 mg Tab  Generic drug: cabozantinib  TAKE ONE TABLET BY MOUTH ONCE DAILY AT THE SAME TIME ON AN EMPTY STOMACH AT LEAST 1 HOUR BEFORE OR 2 HOURS AFTER EATING. AVOID GRAPEFRUIT PRODUCTS     cloNIDine 0.3 mg/24 hr td ptwk 0.3 mg/24 hr  Commonly known as: CATAPRES  Place 1 patch onto the skin every 7 days.     ENTRESTO 49-51 mg per tablet  Generic drug: sacubitriL-valsartan  Take 1 tablet by mouth 2 (two) times daily.     gabapentin 100 MG capsule  Commonly known as: NEURONTIN  Take 1 capsule (100 mg total) by mouth once daily.     hydrALAZINE 100 MG tablet  Commonly known as: APRESOLINE  Take 1 tablet (100 mg total) by mouth every 12 (twelve) hours.     LIDOcaine-prilocaine cream  Commonly known as: EMLA  APPLY ATLEAST 30 MINUTES BEFORE TREATMENT 3 TIMES A WEEK     metoprolol succinate 100 MG 24 hr tablet  Commonly known as: TOPROL-XL  Take 1/2 tablet (50mg) once daily     ONE-A-DAY WOMEN'S 50 PLUS 400-20 mcg Tab  Generic drug: mv,Ca,min-folic acid-vit K1  Take 1 tablet by mouth once daily.     promethazine 25 MG tablet  Commonly known as: PHENERGAN  Take 1 tablet (25 mg total) by mouth every 6 (six) hours as needed for Nausea.     RETACRIT INJ  Epoetin david - epbx (Retacrit)     sodium chloride 0.9% SolP 100 mL with iron sucrose 100 mg iron/5 mL Soln 100 mg  50 mg every Tues, Thurs, Sat.        STOP taking these medications    oxyCODONE-acetaminophen 5-325 mg per tablet  Commonly known as: PERCOCET            Indwelling Lines/Drains at time of discharge:   Lines/Drains/Airways     Central Venous Catheter Line  Duration           Percutaneous Central Line Insertion/Assessment - Triple Lumen   10/29/22 2139 left internal jugular 51 days          Drain  Duration                Hemodialysis AV Fistula Left upper arm -- days                Time spent on the discharge of patient: 46 minutes    Aayush Garcia PA-C  Department of Hospital Medicine  [facility]

## 2022-12-21 ENCOUNTER — PATIENT MESSAGE (OUTPATIENT)
Dept: INTERNAL MEDICINE | Facility: CLINIC | Age: 57
End: 2022-12-21
Payer: COMMERCIAL

## 2022-12-22 ENCOUNTER — OUTPATIENT CASE MANAGEMENT (OUTPATIENT)
Dept: ADMINISTRATIVE | Facility: OTHER | Age: 57
End: 2022-12-22
Payer: COMMERCIAL

## 2022-12-27 ENCOUNTER — TELEPHONE (OUTPATIENT)
Dept: VASCULAR SURGERY | Facility: CLINIC | Age: 57
End: 2022-12-27
Payer: COMMERCIAL

## 2022-12-27 ENCOUNTER — DOCUMENTATION ONLY (OUTPATIENT)
Dept: ELECTROPHYSIOLOGY | Facility: CLINIC | Age: 57
End: 2022-12-27
Payer: COMMERCIAL

## 2022-12-27 ENCOUNTER — ANESTHESIA EVENT (OUTPATIENT)
Dept: SURGERY | Facility: HOSPITAL | Age: 57
End: 2022-12-27
Payer: MEDICARE

## 2022-12-27 NOTE — ANESTHESIA PREPROCEDURE EVALUATION
[facility]  Anesthesia Pre-Operative Evaluation         Patient Name: Eva Bloom  YOB: 1965  MRN: [id number]    SUBJECTIVE:     Pre-operative evaluation for Procedure(s) (LRB):  REVISION, PROCEDURE INVOLVING ARTERIOVENOUS GRAFT (Left)     12/27/2022    Eva Bloom is a 57 y.o. female w/ a significant PMHx of HTN, HFrEF (EF 25%) with AICD in place, Afib on Eliquis, pHTN, metastatic renal cell carcinoma s/p L nephrectomy, ESRD on dialysis (TThS) via LUE AV graft, recent ED visit for diarrhea; found to be C.Diff positive, and increased bleeding after dialysis sticks associated with mid graft stenosis and aneurysmal degeneration with skin thinning.     Patient now presents for the above procedure(s).    TTE Summary 10/30/22:  · The left ventricle is mildly enlarged with moderate concentric hypertrophy and severely decreased systolic function.  · The estimated ejection fraction is 25%.  · Grade II left ventricular diastolic dysfunction.  · Normal right ventricular size with normal right ventricular systolic function.  · Moderate left atrial enlargement.  · Moderate right atrial enlargement.  · There is mild aortic valve stenosis.  · Aortic valve area is 2.11 cm2; peak velocity is 2.11 m/s; mean gradient is 10 mmHg.  · Moderate mitral regurgitation.  · Moderate tricuspid regurgitation.  · Elevated central venous pressure (15 mmHg).  · The estimated PA systolic pressure is 60 mmHg.  · There is pulmonary hypertension.  · Small to moderate circumferential pericardial effusion. No tamponade       Prev airway:   Intubation     Date/Time: 4/19/2021 7:33 AM  Performed by: Gopal Patton Jr. CRNA  Authorized by: Graham Bacon MD      Intubation:     Induction:  Intravenous    Intubated:  Postinduction    Mask Ventilation:  Easy mask    Attempts:  1    Attempted By:  CRNA    Method of Intubation:  Direct    Blade:  Tavo 3    Laryngeal View Grade: Grade I - full view of chords       Difficult Airway Encountered?: No      Complications:  None    Airway Device:  Oral endotracheal tube    Airway Device Size:  7.5    Style/Cuff Inflation:  Cuffed (inflated to minimal occlusive pressure)    Tube secured:  21    Secured at:  The lips    Placement Verified By:  Capnometry    Complicating Factors:  None    Findings Post-Intubation:  BS equal bilateral and atraumatic/condition of teeth unchanged    LDA:   Percutaneous Central Line Insertion/Assessment - Triple Lumen  10/29/22 2139 left internal jugular (Active)   Number of days: 58            Hemodialysis AV Fistula Left upper arm (Active)   Number of days:        Drips: None documented.      Patient Active Problem List   Diagnosis    Kidney stones    Hyperparathyroidism, secondary renal    Hyperuricemia without signs of inflammatory arthritis and tophaceous disease    Proteinuria    Urate nephropathy    Essential hypertension    Pap smear abnormality of cervix with ASCUS favoring benign    CMV (cytomegalovirus) antibody positive    Herpes simplex type 1 antibody positive    H/O herpes simplex type 2 infection    Multinodular goiter    History of kidney cancer    S/p nephrectomy left    Primary insomnia    Cardiomyopathy, nonischemic    Pulmonary hypertension    ESRD (end stage renal disease) on dialysis    Nonrheumatic mitral valve regurgitation    Chronic combined systolic and diastolic congestive heart failure    Anemia in ESRD (end-stage renal disease)    Chronic kidney disease-mineral and bone disorder    Hyperphosphatemia    ICD (implantable cardioverter-defibrillator) in place - SubQ    Right-sided epistaxis    Left-sided epistaxis    Hypercalcemia    Glucose intolerance    PAF (paroxysmal atrial fibrillation)    Transaminitis    Debility    Malfunction of arteriovenous dialysis fistula    Clostridium difficile infection       Review of patient's allergies indicates:   Allergen Reactions    Coreg [carvedilol]  Other (See Comments)     Nausea/vomiting    Allopurinol      Other reaction(s): abnormal transaminases       Current Inpatient Medications:      No current facility-administered medications on file prior to encounter.     Current Outpatient Medications on File Prior to Encounter   Medication Sig Dispense Refill    apixaban (ELIQUIS) 2.5 mg Tab Take 1 tablet (2.5 mg total) by mouth 2 (two) times daily. 60 tablet 11    CABOMETYX 60 mg Tab TAKE ONE TABLET BY MOUTH ONCE DAILY AT THE SAME TIME ON AN EMPTY STOMACH AT LEAST 1 HOUR BEFORE OR 2 HOURS AFTER EATING. AVOID GRAPEFRUIT PRODUCTS 30 tablet 4    gabapentin (NEURONTIN) 100 MG capsule Take 1 capsule (100 mg total) by mouth once daily. 30 capsule 11    hydrALAZINE (APRESOLINE) 100 MG tablet Take 1 tablet (100 mg total) by mouth every 12 (twelve) hours. 180 tablet 2    sacubitriL-valsartan (ENTRESTO) 49-51 mg per tablet Take 1 tablet by mouth 2 (two) times daily. 60 tablet 11    acetaminophen (TYLENOL) 500 MG tablet Take 500 mg by mouth every 6 (six) hours as needed for Pain.      cloNIDine 0.3 mg/24 hr td ptwk (CATAPRES) 0.3 mg/24 hr Place 1 patch onto the skin every 7 days. 4 patch 11    epoetin david-epbx (RETACRIT INJ) Epoetin david - epbx (Retacrit)      lidocaine-prilocaine (EMLA) cream APPLY ATLEAST 30 MINUTES BEFORE TREATMENT 3 TIMES A WEEK 30 g 11    mv,Ca,min-folic acid-vit K1 (ONE-A-DAY WOMEN'S 50 PLUS) 400-20 mcg Tab Take 1 tablet by mouth once daily.      promethazine (PHENERGAN) 25 MG tablet Take 1 tablet (25 mg total) by mouth every 6 (six) hours as needed for Nausea. 15 tablet 0    sodium chloride 0.9% SolP 100 mL with iron sucrose 100 mg iron/5 mL Soln 100 mg 50 mg every Tues, Thurs, Sat.      [DISCONTINUED] amLODIPine (NORVASC) 5 MG tablet TAKE 1 TABLET BY MOUTH EVERY DAY 90 tablet 3       Past Surgical History:   Procedure Laterality Date    BREAST CYST EXCISION      COLONOSCOPY N/A 11/12/2015    COLONOSCOPY N/A 3/12/2021    Procedure:  COLONOSCOPY;  Surgeon: Brendon Lanier MD;  Location: [facility];  Service: Endoscopy;  Laterality: N/A;  covid test 3/9, labs prior, prep instr mailed -ml    INSERTION OF BIVENTRICULAR IMPLANTABLE CARDIOVERTER-DEFIBRILLATOR (ICD)  04/2021    NEPHRECTOMY-LAPAROSCOPIC Left 01/12/2016    PERITONEAL CATHETER INSERTION      Permacath insertion  01/12/2017    SALPINGOOPHORECTOMY Right 2016    KJB---DAVINCI    TONSILLECTOMY      TUBAL LIGATION         Social History:  Tobacco Use: Low Risk     Smoking Tobacco Use: Never    Smokeless Tobacco Use: Never    Passive Exposure: Not on file      Alcohol Use: Not At Risk    Frequency of Alcohol Consumption: Never    Average Number of Drinks: Patient does not drink    Frequency of Binge Drinking: Never        OBJECTIVE:     Vital Signs Range (Last 24H):         Significant Labs:  Lab Results   Component Value Date    WBC 7.73 12/19/2022    HGB 9.7 (L) 12/19/2022    HCT 31.9 (L) 12/19/2022     (L) 12/19/2022    CHOL 101 (L) 10/22/2022    TRIG 97 10/22/2022    HDL 35 (L) 10/22/2022    ALT 27 12/19/2022    AST 88 (H) 12/19/2022     12/19/2022    K 4.4 12/19/2022     12/19/2022    CREATININE 6.1 (H) 12/19/2022    BUN 54 (H) 12/19/2022    CO2 24 12/19/2022    TSH 8.611 (H) 11/30/2022    INR 1.1 11/16/2022    HGBA1C 4.8 10/22/2022       Diagnostic Studies: No relevant studies.    EKG:   Results for orders placed or performed during the hospital encounter of 12/19/22   EKG 12-lead    Collection Time: 12/19/22 11:18 PM    Narrative    Test Reason : I95.9,    Vent. Rate : 079 BPM     Atrial Rate : 079 BPM     P-R Int : 170 ms          QRS Dur : 098 ms      QT Int : 416 ms       P-R-T Axes : 061 006 096 degrees     QTc Int : 477 ms    Normal sinus rhythm  Nonspecific T wave abnormality  Prolonged QT  Abnormal ECG  When compared with ECG of 19-DEC-2022 09:11,  No significant change was found  Confirmed by Shayne Walker MD ([id number]) on 12/20/2022 6:21:03  PM    Referred By: [email or web address]   SELF           Confirmed By:Shayne Walker MD       2D ECHO:  TTE:  Results for orders placed or performed during the hospital encounter of 10/29/22   Echo   Result Value Ref Range    BSA 1.53 m2    TDI SEPTAL 0.04 m/s    LV LATERAL E/E' RATIO 23.80 m/s    LV SEPTAL E/E' RATIO 29.75 m/s    LA WIDTH 4.80 cm    IVC diameter 1.96 cm    Left Ventricular Outflow Tract Mean Velocity 0.81 cm/s    Left Ventricular Outflow Tract Mean Gradient 3.09 mmHg    AORTIC VALVE CUSP SEPERATION 1.97 cm    TDI LATERAL 0.05 m/s    PV PEAK VELOCITY 1.00 cm/s    LVIDd 5.39 3.5 - 6.0 cm    IVS 1.53 (A) 0.6 - 1.1 cm    Posterior Wall 1.67 (A) 0.6 - 1.1 cm    Ao root annulus 3.35 cm    LVIDs 4.50 (A) 2.1 - 4.0 cm    FS 17 28 - 44 %    LA volume 90.24 cm3    Sinus 3.17 cm    STJ 2.62 cm    Ascending aorta 3.20 cm    LV mass 397.67 g    LA size 3.57 cm    RVDD 3.91 cm    TAPSE 2.21 cm    Left Ventricle Relative Wall Thickness 0.62 cm    AV regurgitation pressure 1/2 time [id number] ms    AV mean gradient 10 mmHg    AV valve area 2.11 cm2    AV Velocity Ratio 0.63     AV index (prosthetic) 0.68     MV valve area p 1/2 method 3.97 cm2    E/A ratio 1.09     Mean e' 0.05 m/s    E wave deceleration time 190.77 msec    LVOT diameter 1.99 cm    LVOT area 3.1 cm2    LVOT peak subhash 1.33 m/s    LVOT peak VTI 19.90 cm    Ao peak subhash 2.11 m/s    Ao VTI 29.3 cm    Mr max subhash 5.63 m/s    LVOT stroke volume 61.86 cm3    AV peak gradient 18 mmHg    E/E' ratio 26.44 m/s    MV Peak E Subhash 1.19 m/s    AR Max Subhash 2.80 m/s    TR Max Subhash 3.36 m/s    MV stenosis pressure 1/2 time 55.44 ms    MV Peak A Subhash 1.09 m/s    LV Systolic Volume 92.68 mL    LV Systolic Volume Index 61.0 mL/m2    LV Diastolic Volume 140.59 mL    LV Diastolic Volume Index 92.49 mL/m2    LA Volume Index 59.4 mL/m2    LV Mass Index 262 g/m2    RA Major Axis 6.11 cm    Left Atrium Minor Axis 6.51 cm    Left Atrium Major Axis 5.91 cm    Triscuspid Valve  Regurgitation Peak Gradient 45 mmHg    RA Width 4.40 cm    Right Atrial Pressure (from IVC) 15 mmHg    EF 25 %    TV rest pulmonary artery pressure 60 mmHg    Narrative    · The left ventricle is mildly enlarged with moderate concentric   hypertrophy and severely decreased systolic function.  · The estimated ejection fraction is 25%.  · Grade II left ventricular diastolic dysfunction.  · Normal right ventricular size with normal right ventricular systolic   function.  · Moderate left atrial enlargement.  · Moderate right atrial enlargement.  · There is mild aortic valve stenosis.  · Aortic valve area is 2.11 cm2; peak velocity is 2.11 m/s; mean gradient   is 10 mmHg.  · Moderate mitral regurgitation.  · Moderate tricuspid regurgitation.  · Elevated central venous pressure (15 mmHg).  · The estimated PA systolic pressure is 60 mmHg.  · There is pulmonary hypertension.  · Small to moderate circumferential pericardial effusion. No tamponade          MUSA:  No results found. However, due to the size of the patient record, not all encounters were searched. Please check Results Review for a complete set of results.    ASSESSMENT/PLAN:           Pre-op Assessment    I have reviewed the Patient Summary Reports.     I have reviewed the Nursing Notes. I have reviewed the NPO Status.   I have reviewed the Medications.     Review of Systems  Anesthesia Hx:  No problems with previous Anesthesia  History of prior surgery of interest to airway management or planning: Denies Family Hx of Anesthesia complications.   Denies Personal Hx of Anesthesia complications.   Hematology/Oncology:  Hematology Normal       -- Cancer in past history:    EENT/Dental:EENT/Dental Normal   Cardiovascular:   Pacemaker Hypertension Dysrhythmias atrial fibrillation CHF    Pulmonary:  Pulmonary Normal    Renal/:   Chronic Renal Disease, ESRD, Dialysis    Hepatic/GI:  Hepatic/GI Normal    Musculoskeletal:  Musculoskeletal Normal     Neurological:  Neurology Normal    Endocrine:  Endocrine Normal    Dermatological:  Skin Normal    Psych:  Psychiatric Normal           Physical Exam  General: Well nourished    Airway:  Mallampati: II   Mouth Opening: Normal  TM Distance: Normal  Tongue: Normal  Neck ROM: Normal ROM    Dental:  Intact    Chest/Lungs:  Clear to auscultation, Normal Respiratory Rate    Heart:  Rate: Normal  Rhythm: Regular Rhythm  Sounds: Normal    Abdomen:  Normal, Nontender        Anesthesia Plan  Type of Anesthesia, risks & benefits discussed:    Anesthesia Type: Gen Natural Airway, Regional  Intra-op Monitoring Plan: Standard ASA Monitors  Post Op Pain Control Plan: multimodal analgesia and peripheral nerve block  Induction:  IV  Airway Plan: Direct, Post-Induction  Informed Consent: Informed consent signed with the Patient and all parties understand the risks and agree with anesthesia plan.  All questions answered.   ASA Score: 3  Day of Surgery Review of History & Physical: H&P Update referred to the surgeon/provider.    Ready For Surgery From Anesthesia Perspective.     .

## 2022-12-27 NOTE — TELEPHONE ENCOUNTER
Pt's  states she took Eliquis yesterday rather than holding it because he got is confused with her HTN medication. After discussing with Dr. Muñoz nurse instructed pt to hold Eliquis starting now and pt may proceed with surgery as scheduled.  repeated instructions and verbalized understanding.

## 2022-12-27 NOTE — PROGRESS NOTES
Patient has been identified as having an implanted cardiac rhythm device (CRD); the implanted device is a [employer] defibrillator.      Planned procedure: Revision of AV graft.    No noted pacer dependency.       DEFIBRILLATORS/NON-DEPENDENT ANY LOCATION    Per protocol,a magnet should be applied directly over the implanted device during entire surgical procedure when electrocautery will be used.    If no electrocautery is planned, magnet application is not needed.    For additional questions, please contact the Arrhythmia Department at Ext [phone].

## 2022-12-27 NOTE — PRE-PROCEDURE INSTRUCTIONS
PRE-OP INSTRUCTIONS:  Instructed to have nothing by mouth past midnight.  It is ok to take AM medications with a few sips of water.  Instructed to shower the night before surgery as well as the morning of surgery with an antibacterial soap like dial or hibiclens from the neck down.  Encouraged to wear loose fitting, comfortable clothing .  Medication instructions for pm prior to and am of surgery reviewed.  Instructed to avoid taking vitamins,supplements or ibuprofen the am of surgery.    Patient denies any side effects or issues with anesthesia or sedation.     PATIENT HAS A [employer] AICD.MARKELL MOJICA IN THE [facility] NOTIFIED AT 1045 12/27/2022 AND REPLIED A MAGNET MAY BE PLACED DIRECTLY OVER THE DEVICE FOR THE DURATION OF THE PROCEDURE IF CAUTERY WILL BE USED

## 2022-12-28 ENCOUNTER — HOSPITAL ENCOUNTER (OUTPATIENT)
Facility: HOSPITAL | Age: 57
Discharge: HOME | End: 2022-12-28
Attending: SURGERY | Admitting: SURGERY
Payer: MEDICARE

## 2022-12-28 ENCOUNTER — ANESTHESIA (OUTPATIENT)
Dept: SURGERY | Facility: HOSPITAL | Age: 57
End: 2022-12-28
Payer: MEDICARE

## 2022-12-28 VITALS
RESPIRATION RATE: 16 BRPM | SYSTOLIC BLOOD PRESSURE: 135 MMHG | OXYGEN SATURATION: 98 % | TEMPERATURE: 98 F | HEART RATE: 80 BPM | DIASTOLIC BLOOD PRESSURE: 80 MMHG

## 2022-12-28 DIAGNOSIS — N18.6 ESRD (END STAGE RENAL DISEASE) ON DIALYSIS: ICD-10-CM

## 2022-12-28 DIAGNOSIS — Z99.2 ESRD (END STAGE RENAL DISEASE) ON DIALYSIS: ICD-10-CM

## 2022-12-28 PROCEDURE — 37000009 HC ANESTHESIA EA ADD 15 MINS: Performed by: SURGERY

## 2022-12-28 PROCEDURE — 63600175 PHARM REV CODE 636 W HCPCS: Performed by: STUDENT IN AN ORGANIZED HEALTH CARE EDUCATION/TRAINING PROGRAM

## 2022-12-28 PROCEDURE — 25000003 PHARM REV CODE 250: Performed by: STUDENT IN AN ORGANIZED HEALTH CARE EDUCATION/TRAINING PROGRAM

## 2022-12-28 PROCEDURE — 25000003 PHARM REV CODE 250: Performed by: ANESTHESIOLOGY

## 2022-12-28 PROCEDURE — C1768 GRAFT, VASCULAR: HCPCS | Performed by: SURGERY

## 2022-12-28 PROCEDURE — 71000015 HC POSTOP RECOV 1ST HR: Performed by: SURGERY

## 2022-12-28 PROCEDURE — 25000003 PHARM REV CODE 250: Performed by: SURGERY

## 2022-12-28 PROCEDURE — 71000044 HC DOSC ROUTINE RECOVERY FIRST HOUR: Performed by: SURGERY

## 2022-12-28 PROCEDURE — 37000008 HC ANESTHESIA 1ST 15 MINUTES: Performed by: SURGERY

## 2022-12-28 PROCEDURE — 36832 AV FISTULA REVISION OPEN: CPT | Mod: ,,, | Performed by: SURGERY

## 2022-12-28 PROCEDURE — 36832 PR AV FISTULA REVISION, OPEN, W/O THROMBECTOMY: ICD-10-PCS | Mod: ,,, | Performed by: SURGERY

## 2022-12-28 PROCEDURE — D9220A PRA ANESTHESIA: ICD-10-PCS | Mod: ,,, | Performed by: ANESTHESIOLOGY

## 2022-12-28 PROCEDURE — 36000706: Performed by: SURGERY

## 2022-12-28 PROCEDURE — D9220A PRA ANESTHESIA: Mod: ,,, | Performed by: ANESTHESIOLOGY

## 2022-12-28 PROCEDURE — 63600175 PHARM REV CODE 636 W HCPCS: Performed by: SURGERY

## 2022-12-28 PROCEDURE — 36000707: Performed by: SURGERY

## 2022-12-28 DEVICE — GRAFT ACUSEAL CARDIO 45CM: Type: IMPLANTABLE DEVICE | Site: ARM | Status: FUNCTIONAL

## 2022-12-28 RX ORDER — HYDROCODONE BITARTRATE AND ACETAMINOPHEN 5; 325 MG/1; MG/1
1 TABLET ORAL ONCE
Status: COMPLETED | OUTPATIENT
Start: 2022-12-28 | End: 2022-12-28

## 2022-12-28 RX ORDER — CEFAZOLIN SODIUM 1 G/3ML
INJECTION, POWDER, FOR SOLUTION INTRAMUSCULAR; INTRAVENOUS
Status: DISCONTINUED | OUTPATIENT
Start: 2022-12-28 | End: 2022-12-28

## 2022-12-28 RX ORDER — PROTAMINE SULFATE 10 MG/ML
INJECTION, SOLUTION INTRAVENOUS
Status: DISCONTINUED | OUTPATIENT
Start: 2022-12-28 | End: 2022-12-28

## 2022-12-28 RX ORDER — KETAMINE HCL IN 0.9 % NACL 50 MG/5 ML
SYRINGE (ML) INTRAVENOUS
Status: DISCONTINUED | OUTPATIENT
Start: 2022-12-28 | End: 2022-12-28

## 2022-12-28 RX ORDER — SODIUM CHLORIDE 0.9 % (FLUSH) 0.9 %
10 SYRINGE (ML) INJECTION
Status: DISCONTINUED | OUTPATIENT
Start: 2022-12-28 | End: 2022-12-28 | Stop reason: HOSPADM

## 2022-12-28 RX ORDER — LIDOCAINE HYDROCHLORIDE 20 MG/ML
INJECTION, SOLUTION EPIDURAL; INFILTRATION; INTRACAUDAL; PERINEURAL
Status: COMPLETED | OUTPATIENT
Start: 2022-12-28 | End: 2022-12-28

## 2022-12-28 RX ORDER — MIDAZOLAM HYDROCHLORIDE 1 MG/ML
.5-4 INJECTION INTRAMUSCULAR; INTRAVENOUS
Status: DISCONTINUED | OUTPATIENT
Start: 2022-12-28 | End: 2022-12-28 | Stop reason: HOSPADM

## 2022-12-28 RX ORDER — HEPARIN SODIUM 1000 [USP'U]/ML
INJECTION, SOLUTION INTRAVENOUS; SUBCUTANEOUS
Status: DISCONTINUED | OUTPATIENT
Start: 2022-12-28 | End: 2022-12-28 | Stop reason: HOSPADM

## 2022-12-28 RX ORDER — HEPARIN SODIUM 1000 [USP'U]/ML
INJECTION, SOLUTION INTRAVENOUS; SUBCUTANEOUS
Status: DISCONTINUED | OUTPATIENT
Start: 2022-12-28 | End: 2022-12-28

## 2022-12-28 RX ORDER — HYDROCODONE BITARTRATE AND ACETAMINOPHEN 5; 325 MG/1; MG/1
1 TABLET ORAL EVERY 6 HOURS PRN
Qty: 15 TABLET | Refills: 0 | Status: SHIPPED | OUTPATIENT
Start: 2022-12-28 | End: 2023-01-01 | Stop reason: SDUPTHER

## 2022-12-28 RX ORDER — MIDAZOLAM HYDROCHLORIDE 1 MG/ML
INJECTION, SOLUTION INTRAMUSCULAR; INTRAVENOUS
Status: DISCONTINUED | OUTPATIENT
Start: 2022-12-28 | End: 2022-12-28

## 2022-12-28 RX ORDER — DEXAMETHASONE SODIUM PHOSPHATE 4 MG/ML
INJECTION, SOLUTION INTRA-ARTICULAR; INTRALESIONAL; INTRAMUSCULAR; INTRAVENOUS; SOFT TISSUE
Status: DISCONTINUED | OUTPATIENT
Start: 2022-12-28 | End: 2022-12-28

## 2022-12-28 RX ORDER — DEXMEDETOMIDINE HYDROCHLORIDE 100 UG/ML
INJECTION, SOLUTION INTRAVENOUS
Status: DISCONTINUED | OUTPATIENT
Start: 2022-12-28 | End: 2022-12-28

## 2022-12-28 RX ORDER — SODIUM CHLORIDE 9 MG/ML
INJECTION, SOLUTION INTRAVENOUS CONTINUOUS
Status: DISCONTINUED | OUTPATIENT
Start: 2022-12-28 | End: 2022-12-28 | Stop reason: HOSPADM

## 2022-12-28 RX ORDER — FENTANYL CITRATE 50 UG/ML
25-200 INJECTION, SOLUTION INTRAMUSCULAR; INTRAVENOUS
Status: DISCONTINUED | OUTPATIENT
Start: 2022-12-28 | End: 2022-12-28 | Stop reason: HOSPADM

## 2022-12-28 RX ORDER — ONDANSETRON 2 MG/ML
INJECTION INTRAMUSCULAR; INTRAVENOUS
Status: DISCONTINUED | OUTPATIENT
Start: 2022-12-28 | End: 2022-12-28

## 2022-12-28 RX ADMIN — DEXMEDETOMIDINE HYDROCHLORIDE 4 MCG: 100 INJECTION, SOLUTION INTRAVENOUS at 08:12

## 2022-12-28 RX ADMIN — PROTAMINE SULFATE 5 MG: 10 INJECTION, SOLUTION INTRAVENOUS at 09:12

## 2022-12-28 RX ADMIN — MIDAZOLAM 2 MG: 1 INJECTION INTRAMUSCULAR; INTRAVENOUS at 08:12

## 2022-12-28 RX ADMIN — CEFAZOLIN 2 G: 2 INJECTION, POWDER, FOR SOLUTION INTRAMUSCULAR; INTRAVENOUS at 08:12

## 2022-12-28 RX ADMIN — DEXMEDETOMIDINE HYDROCHLORIDE 4 MCG: 100 INJECTION, SOLUTION INTRAVENOUS at 09:12

## 2022-12-28 RX ADMIN — SODIUM CHLORIDE: 0.9 INJECTION, SOLUTION INTRAVENOUS at 08:12

## 2022-12-28 RX ADMIN — LIDOCAINE HYDROCHLORIDE 40 ML: 20 INJECTION, SOLUTION INTRAVENOUS at 08:12

## 2022-12-28 RX ADMIN — HYDROCODONE BITARTRATE AND ACETAMINOPHEN 1 TABLET: 5; 325 TABLET ORAL at 10:12

## 2022-12-28 RX ADMIN — Medication 15 MG: at 08:12

## 2022-12-28 RX ADMIN — PROTAMINE SULFATE 35 MG: 10 INJECTION, SOLUTION INTRAVENOUS at 09:12

## 2022-12-28 RX ADMIN — Medication 10 MG: at 08:12

## 2022-12-28 RX ADMIN — HEPARIN SODIUM 5000 UNITS: 1000 INJECTION, SOLUTION INTRAVENOUS; SUBCUTANEOUS at 08:12

## 2022-12-28 RX ADMIN — DEXAMETHASONE SODIUM PHOSPHATE 4 MG: 4 INJECTION INTRA-ARTICULAR; INTRALESIONAL; INTRAMUSCULAR; INTRAVENOUS; SOFT TISSUE at 09:12

## 2022-12-28 RX ADMIN — ONDANSETRON 4 MG: 2 INJECTION INTRAMUSCULAR; INTRAVENOUS at 09:12

## 2022-12-28 NOTE — OP NOTE
[facility] - [facility] (2nd Fl)  Operative Note     Surgery Date: 12/28/2022      Surgeon(s) and Role:     * FARIDEH Muñoz III, MD - Primary     * Richard Bunn MD - Fellow     Assisting Surgeon: None     Pre-op Diagnosis:  ESRD (end stage renal disease) on dialysis [N18.6, Z99.2]     Post-op Diagnosis:  Post-Op Diagnosis Codes:     * ESRD (end stage renal disease) on dialysis [N18.6, Z99.2]     Procedure(s) (LRB):  REVISION, PROCEDURE INVOLVING ARTERIOVENOUS GRAFT (Left) (ACCUSEAL)     Anesthesia: Local MAC/Regional     Operative Findings: Body revision, ACCUSEAL     Estimated Blood Loss: 25cc         Specimens:   Specimen (24h ago, onward)        None       Indications:  57-year-old woman with past medical history significant for end-stage renal disease, dialyzing through left AV graft.  The graft has had erosion/thinning of the skin overlying.  The patient was consented for an AV graft revision.    Procedure in detail:  Following it obtaining consent, the patient was taken to the operating room and placed in the supine position.  A left upper extremity regional block was performed by the anesthesia team.  Left arm was prepped and draped in the usual sterile fashion.  A transverse incision was created over the distal segment of the existing AV graft, the incision was carried down to isolate the AV graft using combination electrocautery, blunt and sharp dissection.  A segment approximately 2 cm in length of the graft was exposed circumferentially.  A 2nd incision over the proximal segment of the graft was performed in similar fashion.  A 3rd incision was created at the arc of our proposed AV graft tunneling pattern.  A tunneling instrument was used to bring the 4-7 mm Accu Seal PTFE graft into the proximal and distal incisions.  The  patient was systemically anticoagulated with 5000 units IV heparin.  Proximal and distal segment of the AV graft was clamped with angled DeBakey and the graft was transected  these points.  The graft was trimmed and an end to end anastomosis was performed using 6 0 Kearsarge-Ihsan suture.  This before the final stitches were thrown in the proximal anastomosis, each end of the graft were momentarily unclamped to allow for backbleeding and forward bleeding.  Small needle hole bleeding was made hemostatic with Surgicel.  Once were irrigated and hemostasis obtained.  The skip incision was closed with 4-0 Monocryl interrupted sutures.  The anastomosis incisions were closed with 3-0 Vicryl interrupted sutures and 4-0 Monocryl running subcuticular suture.  There was a palpable thrill through the graft and 2+ palpable radial pulse.    Instrument and sponge count were correct X 2.      Patient tolerated the procedure well and was transported to the postanesthesia care unit for recovery.    Dr. Muñoz was present for the entire case.    Richard Bunn  Vascular Surgery Fellow, PGY-6  [phone]

## 2022-12-28 NOTE — ANESTHESIA POSTPROCEDURE EVALUATION
Anesthesia Post Evaluation    Patient: Eva Bloom    Procedure(s) Performed: Procedure(s) (LRB):  REVISION, PROCEDURE INVOLVING ARTERIOVENOUS GRAFT (Left)    Final Anesthesia Type: regional      Patient location during evaluation: PACU  Patient participation: Yes- Able to Participate  Level of consciousness: awake and alert and awake  Post-procedure vital signs: reviewed and stable  Pain management: adequate  Airway patency: patent    PONV status at discharge: No PONV  Anesthetic complications: no      Cardiovascular status: blood pressure returned to baseline  Respiratory status: unassisted and spontaneous ventilation  Hydration status: euvolemic  Follow-up not needed.          Vitals Value Taken Time   /80 12/28/22 1131   Temp 36.6 °C (97.8 °F) 12/28/22 1007   Pulse 80 12/28/22 1136   Resp 16 12/28/22 1038   SpO2 99 % 12/28/22 1136   Vitals shown include unvalidated device data.      No case tracking events are documented in the log.      Pain/Mimi Score: Pain Rating Prior to Med Admin: 10 (12/28/2022 10:38 AM)  Mimi Score: 9 (12/28/2022 10:45 AM)

## 2022-12-28 NOTE — TRANSFER OF CARE
Anesthesia Transfer of Care Note    Patient: Eva Bloom    Procedure(s) Performed: Procedure(s) (LRB):  REVISION, PROCEDURE INVOLVING ARTERIOVENOUS GRAFT (Left)    Patient location: [facility]    Anesthesia Type: MAC    Transport from OR: Transported from OR on room air with adequate spontaneous ventilation    Post pain: adequate analgesia    Post assessment: no apparent anesthetic complications    Post vital signs: stable    Level of consciousness: awake, alert and oriented    Nausea/Vomiting: no nausea/vomiting    Complications: none    Transfer of care protocol was followed      Last vitals:   Visit Vitals  BP (!) 164/72   Pulse 82   Temp 36.8 °C (98.2 °F) (Oral)   Resp 18   LMP 10/24/2016   SpO2 97%   Breastfeeding No

## 2022-12-28 NOTE — BRIEF OP NOTE
[facility] (2nd Fl)  Brief Operative Note    Surgery Date: 12/28/2022     Surgeon(s) and Role:     * FARIDEH Muñoz III, MD - Primary     * Richard Bunn MD - Fellow    Assisting Surgeon: None    Pre-op Diagnosis:  ESRD (end stage renal disease) on dialysis [N18.6, Z99.2]    Post-op Diagnosis:  Post-Op Diagnosis Codes:     * ESRD (end stage renal disease) on dialysis [N18.6, Z99.2]    Procedure(s) (LRB):  REVISION, PROCEDURE INVOLVING ARTERIOVENOUS GRAFT (Left) (ACCUSEAL)    Anesthesia: Local MAC/Regional    Operative Findings: Body revision, ACCUSEAL    Estimated Blood Loss: 25cc         Specimens:   Specimen (24h ago, onward)      None              Discharge Note    OUTCOME: successful outpatient procedure     DISPOSITION: home    FINAL DIAGNOSIS:  ESRD    FOLLOWUP: 2 weeks with AVG duplex    DISCHARGE INSTRUCTIONS:  see below, may access AVG 12/29/2022

## 2022-12-28 NOTE — PLAN OF CARE
Patient states ready for discharge, VSS, pain and nausea manageable per patient. Dressing is clean, dry and intact. Discharge instructions reviewed.

## 2022-12-28 NOTE — PATIENT INSTRUCTIONS
VASCULAR SURGERY DISCHARGE INSTRUCTIONS    Eliquis: restart in 2 days (Friday)    Woundcare:  - Take your incision dressing off 2 days after your surgery and gently rinse your incision with soap and water daily. Pad the incision dry afterward  - If you have a dialysis catheter in place, keep your catheter dressing clean and dry  - If you do not have a catheter, take a full shower daily beginning 2 days after the surgery. Allow soap and water to run over your incision. Pad the incision dry afterward  - When resting or sleeping, try to keep your arm elevated to shoulder level on pillows to reduce swelling  - If you notice clear drainage from your incision, you can apply dry gauze daily and secure in place with tape or gentle elastic wrap    Activity:  - Avoid prolonged exertion of the affected arm  - Avoid keeping your arm down below your chest for prolonged periods of time (this could lead to increased swelling)  - No heavy lifting with the affected arm  - Sleep with your arm elevated on pillows at night to reduce swelling  -- No swimming in pools, lakes, vitalclipi etc. for 6 weeks after your surgery    Diet:  -Resume your pre-operative home diet    Follow up:  -Refer to follow up instructions     Call Vascular Surgery Office if you experience:  -Increased redness, warmth, tenderness, or draining pus at your incision(s)  -Worsening fevers, chills, nausea/vomiting  -Pain, weakness, coldness, or numbness in your hand  -Uncontrolled pain  -Your call will be returned within 24 hours and further instructions will be provided    Go to ER/Urgent Care if you experience:  -Worsening shortness of breath or chest pain

## 2022-12-28 NOTE — ANESTHESIA PROCEDURE NOTES
L Supraclavicular SS + ICB    Patient location during procedure: OR    Reason for block: primary anesthetic    Diagnosis: L AVF   Start time: 12/28/2022 8:16 AM  Timeout: 12/28/2022 8:09 AM     Staffing  Authorizing Provider: Claude Thompson MD  Performing Provider: Claude Pisano MD    Preanesthetic Checklist  Completed: patient identified, IV checked, site marked, risks and benefits discussed, surgical consent, monitors and equipment checked, pre-op evaluation and timeout performed  Peripheral Block  Patient position: supine  Prep: ChloraPrep  Patient monitoring: heart rate, cardiac monitor, continuous pulse ox, continuous capnometry and frequent blood pressure checks  Block type: supraclavicular  Laterality: left  Injection technique: single shot  Needle  Needle type: Stimuplex   Needle gauge: 22 G  Needle length: 2 in  Needle localization: anatomical landmarks and ultrasound guidance   -ultrasound image captured on disc.  Assessment  Injection assessment: negative aspiration, negative parasthesia and local visualized surrounding nerve  Paresthesia pain: none  Heart rate change: no  Slow fractionated injection: yes    Medications:    Medications: lidocaine (PF) 20 mg/mL (2%) injection - Other   40 mL - 12/28/2022 8:16:00 AM    Additional Notes  Intercostal brachial field block performed with lidocaine 2% 10ml for additional coverage.    VSS.  See anesthesia record.  Patient tolerated procedure well.

## 2023-01-01 ENCOUNTER — HOSPITAL ENCOUNTER (EMERGENCY)
Facility: HOSPITAL | Age: 58
Discharge: HOME | End: 2023-01-01
Attending: EMERGENCY MEDICINE
Payer: MEDICARE

## 2023-01-01 VITALS
TEMPERATURE: 98 F | HEART RATE: 99 BPM | SYSTOLIC BLOOD PRESSURE: 162 MMHG | WEIGHT: 103 LBS | OXYGEN SATURATION: 99 % | DIASTOLIC BLOOD PRESSURE: 83 MMHG | RESPIRATION RATE: 18 BRPM | BODY MASS INDEX: 17.58 KG/M2 | HEIGHT: 64 IN

## 2023-01-01 DIAGNOSIS — R04.0 RIGHT-SIDED EPISTAXIS: Primary | ICD-10-CM

## 2023-01-01 PROCEDURE — 99283 EMERGENCY DEPT VISIT LOW MDM: CPT | Mod: 25

## 2023-01-01 PROCEDURE — 30903 CONTROL OF NOSEBLEED: CPT

## 2023-01-01 PROCEDURE — 30901 CONTROL OF NOSEBLEED: CPT | Mod: RT

## 2023-01-01 PROCEDURE — 25000003 PHARM REV CODE 250: Performed by: EMERGENCY MEDICINE

## 2023-01-01 RX ORDER — HYDROCODONE BITARTRATE AND ACETAMINOPHEN 5; 325 MG/1; MG/1
1 TABLET ORAL EVERY 4 HOURS PRN
Qty: 10 TABLET | Refills: 0 | Status: SHIPPED | OUTPATIENT
Start: 2023-01-01 | End: 2023-05-24

## 2023-01-01 RX ORDER — SILVER NITRATE 38.21; 12.74 MG/1; MG/1
2 STICK TOPICAL
Status: COMPLETED | OUTPATIENT
Start: 2023-01-01 | End: 2023-01-01

## 2023-01-01 RX ORDER — HYDROCODONE BITARTRATE AND ACETAMINOPHEN 5; 325 MG/1; MG/1
1 TABLET ORAL
Status: COMPLETED | OUTPATIENT
Start: 2023-01-01 | End: 2023-01-01

## 2023-01-01 RX ADMIN — SILVER NITRATE APPLICATORS 2 APPLICATOR: 25; 75 STICK TOPICAL at 02:01

## 2023-01-01 RX ADMIN — HYDROCODONE BITARTRATE AND ACETAMINOPHEN 1 TABLET: 5; 325 TABLET ORAL at 04:01

## 2023-01-01 NOTE — ED PROVIDER NOTES
Encounter Date: 1/1/2023       History     Chief Complaint   Patient presents with    Epistaxis     Pt AAOX3, non-diaphoretic, active nosebleeding, denies dizziness, CP, SOB or any other complaints, Tu/Th/Sa dialysis pt     57 y.o. female with ESRD on HD TuThSat, paroxysmal afib on Eliquis, presents via son with R sided nosebleed, acute onset shortly PTA.  No pain.  No lightheadedness.      Patient has had nosebleeds previously and has seen ENT Dr. Malik Burgos.     Seen in ER for similar 10/22/21, 6/6/22, 6/9/22, 6/10/22.  Per son patient has required both silver nitrate and packing.      Review of patient's allergies indicates:   Allergen Reactions    Coreg [carvedilol] Other (See Comments)     Nausea/vomiting    Allopurinol      Other reaction(s): abnormal transaminases     Past Medical History:   Diagnosis Date    Anemia     Bronchitis 03/01/2017    Cancer 2016    kidney cancer    CHF (congestive heart failure), NYHA class II, chronic, systolic     CMV (cytomegalovirus) antibody positive     Essential hypertension 9/23/2015    H/O herpes simplex type 2 infection     Herpes simplex type 1 antibody positive     History of kidney cancer     s/p left nephrectomy 1/2016    Hyperparathyroidism, unspecified     Hyperuricemia without signs of inflammatory arthritis and tophaceous disease     Kidney stones     LGSIL (low grade squamous intraepithelial dysplasia)     Myocardiopathy 7/21/2017    Prediabetes     Proteinuria     Renal disorder     Thyroid nodule     Urate nephropathy      Past Surgical History:   Procedure Laterality Date    BREAST CYST EXCISION      COLONOSCOPY N/A 11/12/2015    COLONOSCOPY N/A 3/12/2021    Procedure: COLONOSCOPY;  Surgeon: Brendon Lanier MD;  Location: [facility];  Service: Endoscopy;  Laterality: N/A;  covid test 3/9, labs prior, prep instr mailed -ml    INSERTION OF BIVENTRICULAR IMPLANTABLE CARDIOVERTER-DEFIBRILLATOR (ICD)  04/2021    NEPHRECTOMY-LAPAROSCOPIC Left 01/12/2016     PERITONEAL CATHETER INSERTION      Permacath insertion  01/12/2017    PLACEMENT OF ARTERIOVENOUS GRAFT  12/28/2022    Procedure: INSERTION, GRAFT, ARTERIOVENOUS;  Surgeon: FARIDEH Muñoz III, MD;  Location: [facility] OR [address] [place] [address];  Service: Peripheral Vascular;;    REVISION OF PROCEDURE INVOLVING ARTERIOVENOUS GRAFT Left 12/28/2022    Procedure: REVISION, PROCEDURE INVOLVING ARTERIOVENOUS GRAFT;  Surgeon: FARIDEH Muñoz III, MD;  [facility];  Service: Peripheral Vascular;  Laterality: Left;    SALPINGOOPHORECTOMY Right 2016    KJB---DAVINCI    TONSILLECTOMY      TUBAL LIGATION       Family History   Problem Relation Age of Onset    Hypertension Mother     Diabetes Father     Kidney disease Father     No Known Problems Son     No Known Problems Son     Diabetes Maternal Grandfather     No Known Problems Sister     No Known Problems Brother     No Known Problems Maternal Grandmother     No Known Problems Paternal Grandmother     No Known Problems Paternal Grandfather     Heart disease Sister     No Known Problems Sister     No Known Problems Brother     No Known Problems Maternal Aunt     No Known Problems Maternal Uncle     No Known Problems Paternal Aunt     No Known Problems Paternal Uncle     Breast cancer Neg Hx     Colon cancer Neg Hx     Cancer Neg Hx     Stroke Neg Hx     Amblyopia Neg Hx     Blindness Neg Hx     Cataracts Neg Hx     Glaucoma Neg Hx     Macular degeneration Neg Hx     Retinal detachment Neg Hx     Strabismus Neg Hx     Thyroid disease Neg Hx      Social History     Tobacco Use    Smoking status: Never    Smokeless tobacco: Never   Substance Use Topics    Alcohol use: No     Comment: . 2 children. works at [employer].    Drug use: No     Review of Systems   Constitutional:  Negative for fever.   HENT:  Positive for nosebleeds.    Eyes:  Negative for photophobia.   Respiratory:  Negative for shortness of breath.    Cardiovascular:  Negative for chest pain.   Gastrointestinal:   Negative for abdominal pain.   Genitourinary:  Positive for difficulty urinating (ESRD on HD).   Musculoskeletal:  Negative for gait problem.   Skin:  Negative for color change.   Neurological:  Negative for syncope.   Hematological:  Bruises/bleeds easily (on Eliquis).     Physical Exam     Initial Vitals [01/01/23 0125]   BP Pulse Resp Temp SpO2   (!) 166/84 106 18 98.4 °F (36.9 °C) 96 %      MAP       --         Physical Exam    Nursing note and vitals reviewed.  Constitutional: She appears well-developed and well-nourished. She is not diaphoretic.   Awake, alert, middle-aged female.   HENT:   Head: Normocephalic and atraumatic.   +active bleeding from R nare, some quarter-sized clots on tissue. Area of epistaxis noted to Kiesselbach plexus but also large clot in back of nose. Blood noted in posterior oropharynx.   Eyes: EOM are normal.   Neck: Neck supple.   Normal range of motion.  Cardiovascular:  Normal rate.           Pulmonary/Chest: No respiratory distress.   Musculoskeletal:         General: Normal range of motion.      Cervical back: Normal range of motion and neck supple.     Neurological: She is alert.   Moving all extremities.   Skin: Skin is warm and dry. No erythema. No pallor.   Psychiatric: She has a normal mood and affect.       ED Course   Epistaxis Mgmt    Date/Time: 1/1/2023 6:43 PM  Performed by: Dora Beebe MD  Authorized by: Dora Beebe MD   Treatment site: right Kiesselbach's area  Repair method: silver nitrate and nasal tampon  Post-procedure assessment: bleeding stopped  Patient tolerance: Patient tolerated the procedure well with no immediate complications  Comments: I attempted cautery with silver nitrate but bleeding continued so I placed nasal tampon with cessation of bleeding.       Labs Reviewed - No data to display       Imaging Results    None          Medications   silver nitrate applicators applicator 2 applicator (2 applicators Topical (Top) Given 1/1/23 0210)    HYDROcodone-acetaminophen 5-325 mg per tablet 1 tablet (1 tablet Oral Given 1/1/23 0410)     Medical Decision Making:   History:   Old Medical Records: I decided to obtain old medical records.  Old Records Summarized: records from previous admission(s).  Initial Assessment:   57 y.o. female on Eliquis with epistaxis from R nare.  Differential Diagnosis:   Ddx includes anterior vs posterior epistaxis, hemorrhage requiring transfusion, other.  ED Management:  Bleeding continued despite silver nitrate so I then placed anterior nasal tampon (5.5cm) with improvement in bleeding.    Patient is currently on oral vancomycin for C diff.  Per recent literature, the evidence for antibiotic prophylaxis of nasal packing is weak, and in this patient who has C diff, I feel the risk of additional antibiotics outweighs benefit.  I discussed this with patient and son.    F/u to ENT this week or return to ER for packing removal.                        Clinical Impression:   Final diagnoses:  [R04.0] Right-sided epistaxis (Primary)        ED Disposition Condition    Discharge Stable          ED Prescriptions       Medication Sig Dispense Start Date End Date Auth. Provider    HYDROcodone-acetaminophen (NORCO) 5-325 mg per tablet Take 1 tablet by mouth every 4 (four) hours as needed for Pain. Causes drowsiness. Do not drive or operate machinery with this medication. Do not drink alcohol with this medication. Causes constipation. Take with stool softener. 10 tablet 1/1/2023 -- Dora Beebe MD          Follow-up Information       Follow up With Specialties Details Why Contact Info    Malik Burgos MD Otolaryngology Schedule an appointment as soon as possible for a visit   [address] [facility] [address]  [phone]               Dora Beebe MD  01/01/23 8919

## 2023-01-01 NOTE — DISCHARGE INSTRUCTIONS
Keep nasal packing in place until you follow up to ENT.     Return to the ER for increasing pain, recurrent bleeding, or if you cannot get an ENT appointment this week.

## 2023-01-01 NOTE — ED TRIAGE NOTES
Eva Bloom is a 57 y.o female with PMHx of HTN, CHF, ESRD, anticoagulant use and anemia to ED c/o epistaxis since 2345.  Denies any trauma but currently bleeding from right nare.

## 2023-01-04 ENCOUNTER — OFFICE VISIT (OUTPATIENT)
Dept: OTOLARYNGOLOGY | Facility: CLINIC | Age: 58
End: 2023-01-04
Payer: MEDICARE

## 2023-01-04 VITALS — BODY MASS INDEX: 17.57 KG/M2 | HEIGHT: 64 IN | WEIGHT: 102.94 LBS

## 2023-01-04 DIAGNOSIS — N18.6 ESRD (END STAGE RENAL DISEASE) ON DIALYSIS: ICD-10-CM

## 2023-01-04 DIAGNOSIS — D64.9 ANEMIA, UNSPECIFIED TYPE: ICD-10-CM

## 2023-01-04 DIAGNOSIS — Z99.2 ESRD (END STAGE RENAL DISEASE) ON DIALYSIS: ICD-10-CM

## 2023-01-04 DIAGNOSIS — R04.0 RECURRENT EPISTAXIS: Primary | ICD-10-CM

## 2023-01-04 PROCEDURE — 99214 PR OFFICE/OUTPT VISIT, EST, LEVL IV, 30-39 MIN: ICD-10-PCS | Mod: 25,S$GLB,, | Performed by: STUDENT IN AN ORGANIZED HEALTH CARE EDUCATION/TRAINING PROGRAM

## 2023-01-04 PROCEDURE — 1160F PR REVIEW ALL MEDS BY PRESCRIBER/CLIN PHARMACIST DOCUMENTED: ICD-10-PCS | Mod: CPTII,S$GLB,, | Performed by: STUDENT IN AN ORGANIZED HEALTH CARE EDUCATION/TRAINING PROGRAM

## 2023-01-04 PROCEDURE — 3008F BODY MASS INDEX DOCD: CPT | Mod: CPTII,S$GLB,, | Performed by: STUDENT IN AN ORGANIZED HEALTH CARE EDUCATION/TRAINING PROGRAM

## 2023-01-04 PROCEDURE — 1160F RVW MEDS BY RX/DR IN RCRD: CPT | Mod: CPTII,S$GLB,, | Performed by: STUDENT IN AN ORGANIZED HEALTH CARE EDUCATION/TRAINING PROGRAM

## 2023-01-04 PROCEDURE — 1159F PR MEDICATION LIST DOCUMENTED IN MEDICAL RECORD: ICD-10-PCS | Mod: CPTII,S$GLB,, | Performed by: STUDENT IN AN ORGANIZED HEALTH CARE EDUCATION/TRAINING PROGRAM

## 2023-01-04 PROCEDURE — 31238 PR NASAL/SINUS ENDOSCOPY,W/CONTROL NASAL HEM: ICD-10-PCS | Mod: S$GLB,,, | Performed by: STUDENT IN AN ORGANIZED HEALTH CARE EDUCATION/TRAINING PROGRAM

## 2023-01-04 PROCEDURE — 3008F PR BODY MASS INDEX (BMI) DOCUMENTED: ICD-10-PCS | Mod: CPTII,S$GLB,, | Performed by: STUDENT IN AN ORGANIZED HEALTH CARE EDUCATION/TRAINING PROGRAM

## 2023-01-04 PROCEDURE — 99214 OFFICE O/P EST MOD 30 MIN: CPT | Mod: 25,S$GLB,, | Performed by: STUDENT IN AN ORGANIZED HEALTH CARE EDUCATION/TRAINING PROGRAM

## 2023-01-04 PROCEDURE — 1159F MED LIST DOCD IN RCRD: CPT | Mod: CPTII,S$GLB,, | Performed by: STUDENT IN AN ORGANIZED HEALTH CARE EDUCATION/TRAINING PROGRAM

## 2023-01-04 PROCEDURE — 31238 NSL/SINS NDSC SRG NSL HEMRRG: CPT | Mod: S$GLB,,, | Performed by: STUDENT IN AN ORGANIZED HEALTH CARE EDUCATION/TRAINING PROGRAM

## 2023-01-04 NOTE — PROGRESS NOTES
"    Subjective:      Eva is a 57 y.o. female who comes for follow-up of  nosebleeds .  Her last visit with me was on 6/22/2022.  Started with slow nosebleed on MAMADOU. Was seeen in the ED where a right RR was placed. Has been hemostatic since. She is still on/needing Eliquis.    Past Medical History:   Diagnosis Date    Anemia     Bronchitis 03/01/2017    Cancer 2016    kidney cancer    CHF (congestive heart failure), NYHA class II, chronic, systolic     CMV (cytomegalovirus) antibody positive     Essential hypertension 9/23/2015    H/O herpes simplex type 2 infection     Herpes simplex type 1 antibody positive     History of kidney cancer     s/p left nephrectomy 1/2016    Hyperparathyroidism, unspecified     Hyperuricemia without signs of inflammatory arthritis and tophaceous disease     Kidney stones     LGSIL (low grade squamous intraepithelial dysplasia)     Myocardiopathy 7/21/2017    Prediabetes     Proteinuria     Renal disorder     Thyroid nodule     Urate nephropathy       Her current sinus regime consists of: Saline.     SNOT-22 score: : (P) 4  NOSE score:: (P) 5%  ETDQ-7 score:: (P) 1    The patient's medications, allergies, past medical, surgical, social and family histories were reviewed and updated as appropriate.    Review of Systems   Eyes: Negative.    Respiratory: Negative.     Gastrointestinal:  Positive for diarrhea.   Genitourinary: Negative.    Musculoskeletal: Negative.    Skin: Negative.    Neurological:  Positive for dizziness.   Psychiatric/Behavioral: Negative.        Answers submitted by the patient for this visit:  Review of Symptoms Questionnaire  (Submitted on 1/4/2023)  Unexpected weight loss?: Yes  Irregular heartbeat?: Yes  None of these: Yes  Cold all of the time? : Yes  None of these: Yes    A detailed review of systems was obtained with pertinent positives as per the above HPI, and otherwise negative.        Objective:     Ht 5' 4" (1.626 m)   Wt 46.7 kg (102 lb 15.3 oz)   LMP " 10/24/2016   BMI 17.67 kg/m²        Constitutional:   Vital signs are normal. She appears well-developed. Normal speech.      Head:  Normocephalic and atraumatic.     Ears:    Right Ear: No drainage or tenderness. No middle ear effusion.   Left Ear: No drainage or tenderness.  No middle ear effusion.     Mouth/Throat  Oropharynx clear and moist without lesions or asymmetry and normal uvula midline. No trismus. No oropharyngeal exudate. Mirror exam not performed due to patient tolerance.  Mirror exam not performed due to patient tolerance.      Neck:  Neck normal without thyromegaly masses, asymmetry, normal tracheal structure, crepitus, and tenderness, thyroid normal and trachea normal.     Pulmonary/Chest:   Effort normal.     Psychiatric:   She has a normal mood and affect. Her speech is normal.     Skin:   No abrasions, lacerations, lesions, or rashes.     Procedure    Nasal endoscopy & control of epistaxis performed.  See procedure note.    Nasal Endoscopy:  1/4/2023    The use of diagnostic nasal endoscopy was considered medically necessary for the evaluation and visualization of the nasal anatomy for symptoms suggestive of nasal or sinus origin. Physical examination (including a nasal speculum evaluation) did not provide sufficient clinical information to establish a diagnosis, or symptoms did not improve or worsened following treatment.     The nasal cavity was decongested with topical 1% phenylephrine and anesthetized with 4% lidocaine.  A rigid 0-degree endoscope was introduced into the nasal cavity.    The patient was seated in the examination chair. After discussion of risks and benefits, a nasal endoscope was inserted into the nose the endoscope was passed along the left nasal floor to the nasopharynx. It was then passed between the middle and superior meatus, nasal turbinates, nasal septum, nasopharynx and sphenoethmoid region. The nasal endoscope was withdrawn and there was no complications. An  identical procedure was performed on the right side. I was present for the entire procedure.The patient tolerated the above procedure well. The findings of this procedure can be found in the dictated note from 1/4/2023 visit.                            Nasal Endoscopy with Control of Epistaxis    After written consent was obtained the nose was anesthetized with topical pontocaine and phenylephrine pledgets. silver nitrate was used to cauterize the prominent vascularity on the right anterior turbinate.  The patient tolerated the procedure well and there were no complications.  Hemostasis was obtained.  Bacitracin ointment was placed after cauterization.      Data Reviewed    WBC (K/uL)   Date Value   12/19/2022 7.73     Eosinophil % (%)   Date Value   12/19/2022 3.2     Eos # (K/uL)   Date Value   12/19/2022 0.3     Platelets (K/uL)   Date Value   12/19/2022 124 (L)     Glucose (mg/dL)   Date Value   12/19/2022 109     No results found for: IGE    H/H stable from ED visit.     I independently reviewed the images of the CT head dated 10/29/22. Pertinent findings include opacification of the left maxillary and ethmoid sinuses. Partial opacification of the right maxillary sinus with mucosal thickening. Left frontal sinus with mucosal thickening. Bilateral sphenoid sinuses well pneumatized.       Assessment:     1. Recurrent epistaxis    2. ESRD (end stage renal disease) on dialysis    3. Anemia, unspecified type         Plan:     - Cautery on the right inferior turbinate performed in clinic  - Do not blow nose for 1 week.  Sneeze with mouth open.  - Do not take ibuprofen or aspirin for 1 week.  - Place saline nasal gel in nostrils at bedtime.  - May use saline nose drops during the day to prevent dryness.  - If bleeding recurs, use oxymetazoline (Afrin) spray in the nostril and call for follow-up appointment.  - She will follow up with me in 3 months time. High volume sinus irrigations were instructed to start to assist  with her sinus complaints.     Malik Burgos MD

## 2023-01-05 DIAGNOSIS — I49.8 OTHER CARDIAC ARRHYTHMIA: Primary | ICD-10-CM

## 2023-01-06 PROCEDURE — G0179 PR HOME HEALTH MD RECERTIFICATION: ICD-10-PCS | Mod: ,,, | Performed by: INTERNAL MEDICINE

## 2023-01-06 PROCEDURE — G0179 MD RECERTIFICATION HHA PT: HCPCS | Mod: ,,, | Performed by: INTERNAL MEDICINE

## 2023-01-13 ENCOUNTER — OFFICE VISIT (OUTPATIENT)
Dept: VASCULAR SURGERY | Facility: CLINIC | Age: 58
End: 2023-01-13
Payer: COMMERCIAL

## 2023-01-13 ENCOUNTER — HOSPITAL ENCOUNTER (OUTPATIENT)
Dept: VASCULAR SURGERY | Facility: CLINIC | Age: 58
Discharge: HOME | End: 2023-01-13
Attending: SURGERY
Payer: MEDICARE

## 2023-01-13 VITALS
WEIGHT: 97 LBS | HEART RATE: 101 BPM | TEMPERATURE: 98 F | BODY MASS INDEX: 16.56 KG/M2 | DIASTOLIC BLOOD PRESSURE: 72 MMHG | SYSTOLIC BLOOD PRESSURE: 126 MMHG | HEIGHT: 64 IN

## 2023-01-13 DIAGNOSIS — Z99.2 ESRD (END STAGE RENAL DISEASE) ON DIALYSIS: Primary | ICD-10-CM

## 2023-01-13 DIAGNOSIS — N18.6 ESRD (END STAGE RENAL DISEASE) ON DIALYSIS: Primary | ICD-10-CM

## 2023-01-13 DIAGNOSIS — Z99.2 ESRD (END STAGE RENAL DISEASE) ON DIALYSIS: ICD-10-CM

## 2023-01-13 DIAGNOSIS — N18.6 ESRD (END STAGE RENAL DISEASE) ON DIALYSIS: ICD-10-CM

## 2023-01-13 PROCEDURE — 1159F MED LIST DOCD IN RCRD: CPT | Mod: CPTII,S$GLB,, | Performed by: SURGERY

## 2023-01-13 PROCEDURE — 99024 POSTOP FOLLOW-UP VISIT: CPT | Mod: S$GLB,,, | Performed by: SURGERY

## 2023-01-13 PROCEDURE — 1159F PR MEDICATION LIST DOCUMENTED IN MEDICAL RECORD: ICD-10-PCS | Mod: CPTII,S$GLB,, | Performed by: SURGERY

## 2023-01-13 PROCEDURE — 99999 PR PBB SHADOW E&M-EST. PATIENT-LVL IV: ICD-10-PCS | Mod: PBBFAC,,, | Performed by: SURGERY

## 2023-01-13 PROCEDURE — 1160F PR REVIEW ALL MEDS BY PRESCRIBER/CLIN PHARMACIST DOCUMENTED: ICD-10-PCS | Mod: CPTII,S$GLB,, | Performed by: SURGERY

## 2023-01-13 PROCEDURE — 1160F RVW MEDS BY RX/DR IN RCRD: CPT | Mod: CPTII,S$GLB,, | Performed by: SURGERY

## 2023-01-13 PROCEDURE — 93990 PR DUPLEX HEMODIALYSIS ACCESS: ICD-10-PCS | Mod: 26,S$PBB,, | Performed by: SURGERY

## 2023-01-13 PROCEDURE — 3078F DIAST BP <80 MM HG: CPT | Mod: CPTII,S$GLB,, | Performed by: SURGERY

## 2023-01-13 PROCEDURE — 3074F SYST BP LT 130 MM HG: CPT | Mod: CPTII,S$GLB,, | Performed by: SURGERY

## 2023-01-13 PROCEDURE — 99999 PR PBB SHADOW E&M-EST. PATIENT-LVL IV: CPT | Mod: PBBFAC,,, | Performed by: SURGERY

## 2023-01-13 PROCEDURE — 93990 DOPPLER FLOW TESTING: CPT | Mod: 26,S$PBB,, | Performed by: SURGERY

## 2023-01-13 PROCEDURE — 3008F PR BODY MASS INDEX (BMI) DOCUMENTED: ICD-10-PCS | Mod: CPTII,S$GLB,, | Performed by: SURGERY

## 2023-01-13 PROCEDURE — 3008F BODY MASS INDEX DOCD: CPT | Mod: CPTII,S$GLB,, | Performed by: SURGERY

## 2023-01-13 PROCEDURE — 93990 DOPPLER FLOW TESTING: CPT | Mod: PBBFAC | Performed by: SURGERY

## 2023-01-13 PROCEDURE — 99024 PR POST-OP FOLLOW-UP VISIT: ICD-10-PCS | Mod: S$GLB,,, | Performed by: SURGERY

## 2023-01-13 PROCEDURE — 3078F PR MOST RECENT DIASTOLIC BLOOD PRESSURE < 80 MM HG: ICD-10-PCS | Mod: CPTII,S$GLB,, | Performed by: SURGERY

## 2023-01-13 PROCEDURE — 3074F PR MOST RECENT SYSTOLIC BLOOD PRESSURE < 130 MM HG: ICD-10-PCS | Mod: CPTII,S$GLB,, | Performed by: SURGERY

## 2023-01-13 NOTE — PROGRESS NOTES
VASCULAR SURGERY SERVICE    CHIEF COMPLAINT:  Functional left AV graft dysfunctional left AV graft    HISTORY OF PRESENT ILLNESS: Eva Bloom is a 57 y.o. female end-stage renal disease, with multiple serious medical comorbidities including admission for sepsis 2 months ago, EF 20%, AFib on Eliquis, who is having increasing bleeding after her dialysis runs.  I placed a left upper arm graft January 2017 followed by a covered stent placement of the outflow tract in March 2017.  She has had uninterrupted use of this graft ever since.    Recent weeks she is had increased bleeding after dialysis sticks    1/13/2023:  This is a 1st postoperative visit after revision, left AV graft with long interposition Accu Seal 12/28/2022.  She is using the revised graft without any difficulty    Past Medical History:   Diagnosis Date    Anemia     Bronchitis 03/01/2017    Cancer 2016    kidney cancer    CHF (congestive heart failure), NYHA class II, chronic, systolic     CMV (cytomegalovirus) antibody positive     Essential hypertension 9/23/2015    H/O herpes simplex type 2 infection     Herpes simplex type 1 antibody positive     History of kidney cancer     s/p left nephrectomy 1/2016    Hyperparathyroidism, unspecified     Hyperuricemia without signs of inflammatory arthritis and tophaceous disease     Kidney stones     LGSIL (low grade squamous intraepithelial dysplasia)     Myocardiopathy 7/21/2017    Prediabetes     Proteinuria     Renal disorder     Thyroid nodule     Urate nephropathy        Past Surgical History:   Procedure Laterality Date    BREAST CYST EXCISION      COLONOSCOPY N/A 11/12/2015    COLONOSCOPY N/A 3/12/2021    Procedure: COLONOSCOPY;  Surgeon: Brendon Lanier MD;  Location: [facility];  Service: Endoscopy;  Laterality: N/A;  covid test 3/9, labs prior, prep instr mailed -ml    INSERTION OF BIVENTRICULAR IMPLANTABLE CARDIOVERTER-DEFIBRILLATOR (ICD)  04/2021    NEPHRECTOMY-LAPAROSCOPIC Left 01/12/2016     PERITONEAL CATHETER INSERTION      Permacath insertion  01/12/2017    PLACEMENT OF ARTERIOVENOUS GRAFT  12/28/2022    Procedure: INSERTION, GRAFT, ARTERIOVENOUS;  Surgeon: FARIDEH Muñoz III, MD;  Location: [facility] OR 2ND FLR;  Service: Peripheral Vascular;;    REVISION OF PROCEDURE INVOLVING ARTERIOVENOUS GRAFT Left 12/28/2022    Procedure: REVISION, PROCEDURE INVOLVING ARTERIOVENOUS GRAFT;  Surgeon: FARIDEH Muñoz III, MD;  Location: [facility] OR 2ND FLR;  Service: Peripheral Vascular;  Laterality: Left;    SALPINGOOPHORECTOMY Right 2016    KJB---DAVINCI    TONSILLECTOMY      TUBAL LIGATION           Current Outpatient Medications:     acetaminophen (TYLENOL) 500 MG tablet, Take 500 mg by mouth every 6 (six) hours as needed for Pain., Disp: , Rfl:     apixaban (ELIQUIS) 2.5 mg Tab, Take 1 tablet (2.5 mg total) by mouth 2 (two) times daily., Disp: 60 tablet, Rfl: 11    CABOMETYX 60 mg Tab, TAKE ONE TABLET BY MOUTH ONCE DAILY AT THE SAME TIME ON AN EMPTY STOMACH AT LEAST 1 HOUR BEFORE OR 2 HOURS AFTER EATING. AVOID GRAPEFRUIT PRODUCTS, Disp: 30 tablet, Rfl: 4    cloNIDine 0.3 mg/24 hr td ptwk (CATAPRES) 0.3 mg/24 hr, Place 1 patch onto the skin every 7 days., Disp: 4 patch, Rfl: 11    epoetin david-epbx (RETACRIT INJ), Epoetin david - epbx (Retacrit), Disp: , Rfl:     gabapentin (NEURONTIN) 100 MG capsule, Take 1 capsule (100 mg total) by mouth once daily., Disp: 30 capsule, Rfl: 11    hydrALAZINE (APRESOLINE) 100 MG tablet, Take 1 tablet (100 mg total) by mouth every 12 (twelve) hours., Disp: 180 tablet, Rfl: 2    HYDROcodone-acetaminophen (NORCO) 5-325 mg per tablet, Take 1 tablet by mouth every 4 (four) hours as needed for Pain. Causes drowsiness. Do not drive or operate machinery with this medication. Do not drink alcohol with this medication. Causes constipation. Take with stool softener., Disp: 10 tablet, Rfl: 0    lidocaine-prilocaine (EMLA) cream, APPLY ATLEAST 30 MINUTES BEFORE TREATMENT 3 TIMES A WEEK, Disp: 30  g, Rfl: 11    metoprolol succinate (TOPROL-XL) 100 MG 24 hr tablet, Take 1/2 tablet (50mg) once daily, Disp: 90 tablet, Rfl: 3    mv,Ca,min-folic acid-vit K1 (ONE-A-DAY WOMEN'S 50 PLUS) 400-20 mcg Tab, Take 1 tablet by mouth once daily., Disp: , Rfl:     promethazine (PHENERGAN) 25 MG tablet, Take 1 tablet (25 mg total) by mouth every 6 (six) hours as needed for Nausea., Disp: 15 tablet, Rfl: 0    sacubitriL-valsartan (ENTRESTO) 49-51 mg per tablet, Take 1 tablet by mouth 2 (two) times daily., Disp: 60 tablet, Rfl: 11    sodium chloride 0.9% SolP 100 mL with iron sucrose 100 mg iron/5 mL Soln 100 mg, 50 mg every Tues, Thurs, Sat., Disp: , Rfl:     Review of patient's allergies indicates:   Allergen Reactions    Coreg [carvedilol] Other (See Comments)     Nausea/vomiting    Allopurinol      Other reaction(s): abnormal transaminases       Family History   Problem Relation Age of Onset    Hypertension Mother     Diabetes Father     Kidney disease Father     No Known Problems Son     No Known Problems Son     Diabetes Maternal Grandfather     No Known Problems Sister     No Known Problems Brother     No Known Problems Maternal Grandmother     No Known Problems Paternal Grandmother     No Known Problems Paternal Grandfather     Heart disease Sister     No Known Problems Sister     No Known Problems Brother     No Known Problems Maternal Aunt     No Known Problems Maternal Uncle     No Known Problems Paternal Aunt     No Known Problems Paternal Uncle     Breast cancer Neg Hx     Colon cancer Neg Hx     Cancer Neg Hx     Stroke Neg Hx     Amblyopia Neg Hx     Blindness Neg Hx     Cataracts Neg Hx     Glaucoma Neg Hx     Macular degeneration Neg Hx     Retinal detachment Neg Hx     Strabismus Neg Hx     Thyroid disease Neg Hx        Social History     Tobacco Use    Smoking status: Never    Smokeless tobacco: Never   Substance Use Topics    Alcohol use: No     Comment: . 2 children. works at [employer].    Drug use: No  "      REVIEW OF SYSTEMS:  General: No chills, fever, malaise, changes in weight  HEENT: No visual changes, difficulty hearing  Cardiovascular: No chest pain, palpitations, claudication  Pulmonary: No dyspnea, cough, wheezing  Gastrointestinal: No nausea, vomiting, diarrhea, constipation  Genitourinary: No dysuria, low urine output, hematuria  Endocrine: No polydipsia, polyphagia  Hematologic: No fatigue with exertion, pica, pallor  Musculoskeletal: No extremity or joint pain, no back pain, no difficulty with ambulation  Neurologic: no seizures, no headaches, no weakness  Psychiatric: no mood disturbance      PHYSICAL EXAM:   /72 (BP Location: Right arm, Patient Position: Sitting, BP Method: Large (Automatic))   Pulse 101   Temp 98 °F (36.7 °C) (Oral)   Ht 5' 4" (1.626 m)   Wt 44 kg (97 lb)   LMP 10/24/2016   BMI 16.65 kg/m²   Constitutional:  Alert,   Well-appearing  In no distress.   Neurological: Normal speech  no focal findings  CN II - XII grossly intact.    Psychiatric: Mood and affect appropriate and symmetric.   HEENT: Normocephalic / atraumatic  PERRLA  Midline trachea  No scars across the neck   Cardiac: Regular rate and rhythm.   Pulmonary: Normal pulmonary effort.   Abdomen: Soft, not distended.     Skin: Warm and well perfused.    Vascular:  Radial pulses are nonpalpable bilaterally.   Extremities/  Musculoskeletal: No edema.   Left arm demonstrates well-healed upper arm incisions, with a nice thrill in the laterally tunneled Accu Seal graft     IMAGING:  Duplex of the left AV graft showed to be patent without stenosis with a flow volume of 1.78 L      IMPRESSION:  2 weeks status post revision, left AV graft.  Doing well    PLAN:  Follow-up in 4 months with AV graft duplex, sooner if clinically indicated    MANPREET Muñoz III, MD, FACS  Professor and Chief, Vascular and Endovascular Surgery        "

## 2023-01-18 ENCOUNTER — PATIENT MESSAGE (OUTPATIENT)
Dept: SURGERY | Facility: CLINIC | Age: 58
End: 2023-01-18
Payer: MEDICARE

## 2023-01-24 ENCOUNTER — ANESTHESIA EVENT (OUTPATIENT)
Dept: SURGERY | Facility: HOSPITAL | Age: 58
DRG: 252 | End: 2023-01-24
Payer: MEDICARE

## 2023-01-24 ENCOUNTER — OFFICE VISIT (OUTPATIENT)
Dept: VASCULAR SURGERY | Facility: CLINIC | Age: 58
DRG: 252 | End: 2023-01-24
Payer: MEDICARE

## 2023-01-24 VITALS
HEART RATE: 65 BPM | BODY MASS INDEX: 16.56 KG/M2 | WEIGHT: 97 LBS | SYSTOLIC BLOOD PRESSURE: 107 MMHG | DIASTOLIC BLOOD PRESSURE: 59 MMHG | TEMPERATURE: 99 F | HEIGHT: 64 IN

## 2023-01-24 DIAGNOSIS — T82.7XXA ARTERIOVENOUS GRAFT INFECTION, INITIAL ENCOUNTER: ICD-10-CM

## 2023-01-24 DIAGNOSIS — Z01.818 PRE-OP EVALUATION: ICD-10-CM

## 2023-01-24 DIAGNOSIS — T82.7XXA INFECTION OF AV GRAFT FOR DIALYSIS: Primary | ICD-10-CM

## 2023-01-24 PROCEDURE — 99024 POSTOP FOLLOW-UP VISIT: CPT | Mod: POP,,, | Performed by: SURGERY

## 2023-01-24 PROCEDURE — 99999 PR PBB SHADOW E&M-EST. PATIENT-LVL III: CPT | Mod: PBBFAC,,, | Performed by: SURGERY

## 2023-01-24 PROCEDURE — 99999 PR PBB SHADOW E&M-EST. PATIENT-LVL III: ICD-10-PCS | Mod: PBBFAC,,, | Performed by: SURGERY

## 2023-01-24 PROCEDURE — 99213 OFFICE O/P EST LOW 20 MIN: CPT | Mod: PBBFAC | Performed by: SURGERY

## 2023-01-24 PROCEDURE — 99024 PR POST-OP FOLLOW-UP VISIT: ICD-10-PCS | Mod: POP,,, | Performed by: SURGERY

## 2023-01-24 RX ORDER — CEPHALEXIN 500 MG/1
500 CAPSULE ORAL EVERY 6 HOURS
Qty: 4 CAPSULE | Refills: 0 | Status: ON HOLD | OUTPATIENT
Start: 2023-01-24 | End: 2023-01-30 | Stop reason: HOSPADM

## 2023-01-24 NOTE — ANESTHESIA PREPROCEDURE EVALUATION
[facility]  Anesthesia Pre-Operative Evaluation         Patient Name: Eva Bloom  YOB: 1965  MRN: [id number]    SUBJECTIVE:     Pre-operative evaluation for Procedure(s) (LRB):  REMOVAL, GRAFT, ARTERIOVENOUS, UPPER EXTREMITY (Left)  INSERTION, DUAL LUMEN CATHETER WITH PORT, WITH IMAGING GUIDANCE (N/A)     01/24/2023    Eva Bloom is a 57 y.o. female w/ a significant PMHx of HTN, HLD, ESRD on HD (LUE AVF), CHF (EF 25%) s/p AICD, Afib (on Eliquis, last EKG 12/19/22 NSR), RCC (s/p L nephrectomy in 2016), Anemia. Admitted for sepsis 2 months ago, now with increased bleeding from AVF during dialysis and pain and erythema around AVF.    Patient now presents for the above procedure(s).    TTE 10/30/22:   The left ventricle is mildly enlarged with moderate concentric hypertrophy and severely decreased systolic function.   The estimated ejection fraction is 25%.   Grade II left ventricular diastolic dysfunction.   Normal right ventricular size with normal right ventricular systolic function.   Moderate left atrial enlargement.   Moderate right atrial enlargement.   There is mild aortic valve stenosis.   Aortic valve area is 2.11 cm2; peak velocity is 2.11 m/s; mean gradient is 10 mmHg.   Moderate mitral regurgitation.   Moderate tricuspid regurgitation.   Elevated central venous pressure (15 mmHg).   The estimated PA systolic pressure is 60 mmHg.   There is pulmonary hypertension.   Small to moderate circumferential pericardial effusion. No tamponade         LDA:   Percutaneous Central Line Insertion/Assessment - Triple Lumen  10/29/22 2139 left internal jugular (Active)   Number of days: 86            Hemodialysis AV Fistula Left upper arm (Active)   Dressing Intervention Integrity maintained 12/28/22 1050   Dressing Status Clean;Dry;Intact 12/28/22 1050   Number of days:        Prev airway:  Intubation     Date/Time: 4/19/2021 7:33 AM  Performed by: Gopal Patton  Jr., CRNA  Authorized by: Graham Bacon MD      Intubation:     Induction:  Intravenous    Intubated:  Postinduction    Mask Ventilation:  Easy mask    Attempts:  1    Attempted By:  CRNA    Method of Intubation:  Direct    Blade:  Tavo 3    Laryngeal View Grade: Grade I - full view of chords      Difficult Airway Encountered?: No      Complications:  None    Airway Device:  Oral endotracheal tube    Airway Device Size:  7.5    Style/Cuff Inflation:  Cuffed (inflated to minimal occlusive pressure)    Tube secured:  21    Secured at:  The lips    Placement Verified By:  Capnometry    Complicating Factors:  None    Findings Post-Intubation:  BS equal bilateral and atraumatic/condition of teeth unchanged          Drips: None documented.      Patient Active Problem List   Diagnosis    Kidney stones    Hyperparathyroidism, secondary renal    Hyperuricemia without signs of inflammatory arthritis and tophaceous disease    Proteinuria    Urate nephropathy    Essential hypertension    Pap smear abnormality of cervix with ASCUS favoring benign    CMV (cytomegalovirus) antibody positive    Herpes simplex type 1 antibody positive    H/O herpes simplex type 2 infection    Multinodular goiter    History of kidney cancer    S/p nephrectomy left    Primary insomnia    Cardiomyopathy, nonischemic    Pulmonary hypertension    ESRD (end stage renal disease) on dialysis    Nonrheumatic mitral valve regurgitation    Chronic combined systolic and diastolic congestive heart failure    Anemia in ESRD (end-stage renal disease)    Chronic kidney disease-mineral and bone disorder    Hyperphosphatemia    ICD (implantable cardioverter-defibrillator) in place - SubQ    Right-sided epistaxis    Left-sided epistaxis    Hypercalcemia    Glucose intolerance    PAF (paroxysmal atrial fibrillation)    Transaminitis    Debility    Malfunction of arteriovenous dialysis fistula    Clostridium difficile infection     Arteriovenous graft infection       Review of patient's allergies indicates:   Allergen Reactions    Coreg [carvedilol] Other (See Comments)     Nausea/vomiting    Allopurinol      Other reaction(s): abnormal transaminases       Current Inpatient Medications:      No current facility-administered medications on file prior to encounter.     Current Outpatient Medications on File Prior to Encounter   Medication Sig Dispense Refill    acetaminophen (TYLENOL) 500 MG tablet Take 500 mg by mouth every 6 (six) hours as needed for Pain.      apixaban (ELIQUIS) 2.5 mg Tab Take 1 tablet (2.5 mg total) by mouth 2 (two) times daily. 60 tablet 11    CABOMETYX 60 mg Tab TAKE ONE TABLET BY MOUTH ONCE DAILY AT THE SAME TIME ON AN EMPTY STOMACH AT LEAST 1 HOUR BEFORE OR 2 HOURS AFTER EATING. AVOID GRAPEFRUIT PRODUCTS 30 tablet 4    cephALEXin (KEFLEX) 500 MG capsule Take 1 capsule (500 mg total) by mouth every 6 (six) hours. for 1 day 4 capsule 0    cloNIDine 0.3 mg/24 hr td ptwk (CATAPRES) 0.3 mg/24 hr Place 1 patch onto the skin every 7 days. 4 patch 11    epoetin david-epbx (RETACRIT INJ) Epoetin david - epbx (Retacrit)      gabapentin (NEURONTIN) 100 MG capsule Take 1 capsule (100 mg total) by mouth once daily. 30 capsule 11    hydrALAZINE (APRESOLINE) 100 MG tablet Take 1 tablet (100 mg total) by mouth every 12 (twelve) hours. 180 tablet 2    HYDROcodone-acetaminophen (NORCO) 5-325 mg per tablet Take 1 tablet by mouth every 4 (four) hours as needed for Pain. Causes drowsiness. Do not drive or operate machinery with this medication. Do not drink alcohol with this medication. Causes constipation. Take with stool softener. 10 tablet 0    lidocaine-prilocaine (EMLA) cream APPLY ATLEAST 30 MINUTES BEFORE TREATMENT 3 TIMES A WEEK 30 g 11    metoprolol succinate (TOPROL-XL) 100 MG 24 hr tablet Take 1/2 tablet (50mg) once daily 90 tablet 3    mv,Ca,min-folic acid-vit K1 (ONE-A-DAY WOMEN'S 50 PLUS) 400-20 mcg Tab Take 1  tablet by mouth once daily.      promethazine (PHENERGAN) 25 MG tablet Take 1 tablet (25 mg total) by mouth every 6 (six) hours as needed for Nausea. 15 tablet 0    sacubitriL-valsartan (ENTRESTO) 49-51 mg per tablet Take 1 tablet by mouth 2 (two) times daily. 60 tablet 11    sodium chloride 0.9% SolP 100 mL with iron sucrose 100 mg iron/5 mL Soln 100 mg 50 mg every Tues, Thurs, Sat.      [DISCONTINUED] amLODIPine (NORVASC) 5 MG tablet TAKE 1 TABLET BY MOUTH EVERY DAY 90 tablet 3       Past Surgical History:   Procedure Laterality Date    BREAST CYST EXCISION      COLONOSCOPY N/A 11/12/2015    COLONOSCOPY N/A 3/12/2021    Procedure: COLONOSCOPY;  Surgeon: Brendon Lanier MD;  Location: [facility];  Service: Endoscopy;  Laterality: N/A;  covid test 3/9, labs prior, prep instr mailed -ml    INSERTION OF BIVENTRICULAR IMPLANTABLE CARDIOVERTER-DEFIBRILLATOR (ICD)  04/2021    NEPHRECTOMY-LAPAROSCOPIC Left 01/12/2016    PERITONEAL CATHETER INSERTION      Permacath insertion  01/12/2017    PLACEMENT OF ARTERIOVENOUS GRAFT  12/28/2022    Procedure: INSERTION, GRAFT, ARTERIOVENOUS;  Surgeon: FARIDEH Muñoz III, MD;  Location: [facility] OR [address] [place] [address];  Service: Peripheral Vascular;;    REVISION OF PROCEDURE INVOLVING ARTERIOVENOUS GRAFT Left 12/28/2022    Procedure: REVISION, PROCEDURE INVOLVING ARTERIOVENOUS GRAFT;  Surgeon: FARIDEH Muñoz III, MD;  Location: [facility] OR [facility] FLR;  Service: Peripheral Vascular;  Laterality: Left;    SALPINGOOPHORECTOMY Right 2016    KJB---DAVINCI    TONSILLECTOMY      TUBAL LIGATION         Social History     Socioeconomic History    Marital status:     Number of children: 2   Occupational History    Occupation: [employer]     Employer: [id number]   Tobacco Use    Smoking status: Never    Smokeless tobacco: Never   Substance and Sexual Activity    Alcohol use: No     Comment: . 2 children. works at [employer].    Drug use: No    Sexual activity: Yes      Partners: Male     Social Determinants of Health     Financial Resource Strain: Low Risk     Difficulty of Paying Living Expenses: Not hard at all   Food Insecurity: No Food Insecurity    Worried About Running Out of Food in the Last Year: Never true    Ran Out of Food in the Last Year: Never true   Transportation Needs: No Transportation Needs    Lack of Transportation (Medical): No    Lack of Transportation (Non-Medical): No   Physical Activity: Insufficiently Active    Days of Exercise per Week: 3 days    Minutes of Exercise per Session: 30 min   Stress: No Stress Concern Present    Feeling of Stress : Not at all   Social Connections: Socially Integrated    Frequency of Communication with Friends and Family: More than three times a week    Frequency of Social Gatherings with Friends and Family: Once a week    Attends Rastafari Services: 1 to 4 times per year    Active Member of Clubs or Organizations: Yes    Attends Club or Organization Meetings: 1 to 4 times per year    Marital Status:    Housing Stability: Low Risk     Unable to Pay for Housing in the Last Year: No    Number of Places Lived in the Last Year: 1    Unstable Housing in the Last Year: No       OBJECTIVE:     Vital Signs Range (Last 24H):  Temp:  [36.9 °C (98.5 °F)]   Pulse:  [65]   BP: (107)/(59)       Significant Labs:  Lab Results   Component Value Date    WBC 7.73 12/19/2022    HGB 9.7 (L) 12/19/2022    HCT 31.9 (L) 12/19/2022     (L) 12/19/2022    CHOL 101 (L) 10/22/2022    TRIG 97 10/22/2022    HDL 35 (L) 10/22/2022    ALT 27 12/19/2022    AST 88 (H) 12/19/2022     12/19/2022    K 4.4 12/19/2022     12/19/2022    CREATININE 6.1 (H) 12/19/2022    BUN 54 (H) 12/19/2022    CO2 24 12/19/2022    TSH 8.611 (H) 11/30/2022    INR 1.1 11/16/2022    HGBA1C 4.8 10/22/2022       Diagnostic Studies: No relevant studies.    EKG:   Results for orders placed or performed during the hospital encounter of 12/19/22    EKG 12-lead    Collection Time: 12/19/22 11:18 PM    Narrative    Test Reason : I95.9,    Vent. Rate : 079 BPM     Atrial Rate : 079 BPM     P-R Int : 170 ms          QRS Dur : 098 ms      QT Int : 416 ms       P-R-T Axes : 061 006 096 degrees     QTc Int : 477 ms    Normal sinus rhythm  Nonspecific T wave abnormality  Prolonged QT  Abnormal ECG  When compared with ECG of 19-DEC-2022 09:11,  No significant change was found  Confirmed by Shayne Walker MD ([id number]) on 12/20/2022 6:21:03 PM    Referred By: [email or web address]   SELF           Confirmed By:Shayne Walker MD       2D ECHO:  TTE:  Results for orders placed or performed during the hospital encounter of 10/29/22   Echo   Result Value Ref Range    BSA 1.53 m2    TDI SEPTAL 0.04 m/s    LV LATERAL E/E' RATIO 23.80 m/s    LV SEPTAL E/E' RATIO 29.75 m/s    LA WIDTH 4.80 cm    IVC diameter 1.96 cm    Left Ventricular Outflow Tract Mean Velocity 0.81 cm/s    Left Ventricular Outflow Tract Mean Gradient 3.09 mmHg    AORTIC VALVE CUSP SEPERATION 1.97 cm    TDI LATERAL 0.05 m/s    PV PEAK VELOCITY 1.00 cm/s    LVIDd 5.39 3.5 - 6.0 cm    IVS 1.53 (A) 0.6 - 1.1 cm    Posterior Wall 1.67 (A) 0.6 - 1.1 cm    Ao root annulus 3.35 cm    LVIDs 4.50 (A) 2.1 - 4.0 cm    FS 17 28 - 44 %    LA volume 90.24 cm3    Sinus 3.17 cm    STJ 2.62 cm    Ascending aorta 3.20 cm    LV mass 397.67 g    LA size 3.57 cm    RVDD 3.91 cm    TAPSE 2.21 cm    Left Ventricle Relative Wall Thickness 0.62 cm    AV regurgitation pressure 1/2 time [id number] ms    AV mean gradient 10 mmHg    AV valve area 2.11 cm2    AV Velocity Ratio 0.63     AV index (prosthetic) 0.68     MV valve area p 1/2 method 3.97 cm2    E/A ratio 1.09     Mean e' 0.05 m/s    E wave deceleration time 190.77 msec    LVOT diameter 1.99 cm    LVOT area 3.1 cm2    LVOT peak timothy 1.33 m/s    LVOT peak VTI 19.90 cm    Ao peak timothy 2.11 m/s    Ao VTI 29.3 cm    Mr max timothy 5.63 m/s    LVOT stroke volume 61.86 cm3    AV peak gradient 18  mmHg    E/E' ratio 26.44 m/s    MV Peak E Subhash 1.19 m/s    AR Max Subhash 2.80 m/s    TR Max Subhash 3.36 m/s    MV stenosis pressure 1/2 time 55.44 ms    MV Peak A Subhash 1.09 m/s    LV Systolic Volume 92.68 mL    LV Systolic Volume Index 61.0 mL/m2    LV Diastolic Volume 140.59 mL    LV Diastolic Volume Index 92.49 mL/m2    LA Volume Index 59.4 mL/m2    LV Mass Index 262 g/m2    RA Major Axis 6.11 cm    Left Atrium Minor Axis 6.51 cm    Left Atrium Major Axis 5.91 cm    Triscuspid Valve Regurgitation Peak Gradient 45 mmHg    RA Width 4.40 cm    Right Atrial Pressure (from IVC) 15 mmHg    EF 25 %    TV rest pulmonary artery pressure 60 mmHg    Narrative    · The left ventricle is mildly enlarged with moderate concentric   hypertrophy and severely decreased systolic function.  · The estimated ejection fraction is 25%.  · Grade II left ventricular diastolic dysfunction.  · Normal right ventricular size with normal right ventricular systolic   function.  · Moderate left atrial enlargement.  · Moderate right atrial enlargement.  · There is mild aortic valve stenosis.  · Aortic valve area is 2.11 cm2; peak velocity is 2.11 m/s; mean gradient   is 10 mmHg.  · Moderate mitral regurgitation.  · Moderate tricuspid regurgitation.  · Elevated central venous pressure (15 mmHg).  · The estimated PA systolic pressure is 60 mmHg.  · There is pulmonary hypertension.  · Small to moderate circumferential pericardial effusion. No tamponade          MUSA:  No results found. However, due to the size of the patient record, not all encounters were searched. Please check Results Review for a complete set of results.    ASSESSMENT/PLAN:                                                                                                                  01/24/2023  Eva Bloom is a 57 y.o., female.      Pre-op Assessment    I have reviewed the Patient Summary Reports.     I have reviewed the Nursing Notes. I have reviewed the NPO Status.   I have  reviewed the Medications.     Review of Systems  Anesthesia Hx:  No previous Anesthesia  History of prior surgery of interest to airway management or planning: Denies Family Hx of Anesthesia complications.   Denies Personal Hx of Anesthesia complications.   Hematology/Oncology:  Hematology Normal       -- Cancer in past history:    EENT/Dental:EENT/Dental Normal   Cardiovascular:   Exercise tolerance: good Pacemaker Hypertension Valvular problems/Murmurs, MR Denies CAD.    Denies CABG/stent. Dysrhythmias atrial fibrillation CHF no hyperlipidemia AICD   Pulmonary:   Denies COPD.  Denies Asthma.  Denies Sleep Apnea.    Renal/:   Chronic Renal Disease, ESRD renal calculi    Hepatic/GI:   Denies GERD.    Musculoskeletal:  Musculoskeletal Normal    Neurological:   Denies CVA.  Denies Headaches. Denies Seizures.    Endocrine:   Denies Diabetes.  Denies Obesity / BMI > 30  Dermatological:  Skin Normal    Psych:   Denies Psychiatric History.          Physical Exam  General: Well nourished, Cooperative, Alert, Oriented and Cachexia    Airway:  Mallampati: II / II  Mouth Opening: Normal  TM Distance: Normal  Tongue: Normal  Neck ROM: Normal ROM    Dental:  Intact        Anesthesia Plan  Type of Anesthesia, risks & benefits discussed:    Anesthesia Type: Regional, Gen Supraglottic Airway, Gen Natural Airway, MAC, Gen ETT  Intra-op Monitoring Plan: Standard ASA Monitors  Post Op Pain Control Plan: multimodal analgesia and IV/PO Opioids PRN  Induction:  IV  Airway Plan: Direct and Video, Post-Induction  Informed Consent: Informed consent signed with the Patient and all parties understand the risks and agree with anesthesia plan.  All questions answered. Patient consented to blood products? Yes  ASA Score: 4  Day of Surgery Review of History & Physical: H&P Update referred to the surgeon/provider.    Ready For Surgery From Anesthesia Perspective.     .

## 2023-01-24 NOTE — PROGRESS NOTES
VASCULAR SURGERY SERVICE    CHIEF COMPLAINT:  Functional left AV graft dysfunctional left AV graft    HISTORY OF PRESENT ILLNESS: Eva Bloom is a 57 y.o. female end-stage renal disease, with multiple serious medical comorbidities including admission for sepsis 2 months ago, EF 20%, AFib on Eliquis, who is having increasing bleeding after her dialysis runs.  I placed a left upper arm graft January 2017 followed by a covered stent placement of the outflow tract in March 2017.  She has had uninterrupted use of this graft ever since.    Recent weeks she is had increased bleeding after dialysis sticks    1/13/2023:  This is a 1st postoperative visit after revision, left AV graft with long interposition Accu Seal 12/28/2022.  She is using the revised graft without any difficulty    1/24/2023:  She now returns with pain and erythema at her old excluded left AV graft.  She continues to use the new graft without difficulty which was tunneled laterally.  She last dialyzed today.  She also has a cold    Past Medical History:   Diagnosis Date    Anemia     Bronchitis 03/01/2017    Cancer 2016    kidney cancer    CHF (congestive heart failure), NYHA class II, chronic, systolic     CMV (cytomegalovirus) antibody positive     Essential hypertension 9/23/2015    H/O herpes simplex type 2 infection     Herpes simplex type 1 antibody positive     History of kidney cancer     s/p left nephrectomy 1/2016    Hyperparathyroidism, unspecified     Hyperuricemia without signs of inflammatory arthritis and tophaceous disease     Kidney stones     LGSIL (low grade squamous intraepithelial dysplasia)     Myocardiopathy 7/21/2017    Prediabetes     Proteinuria     Renal disorder     Thyroid nodule     Urate nephropathy        Past Surgical History:   Procedure Laterality Date    BREAST CYST EXCISION      COLONOSCOPY N/A 11/12/2015    COLONOSCOPY N/A 3/12/2021    Procedure: COLONOSCOPY;  Surgeon: Brendon Lanier MD;  Location: [facility]  ENDO;  Service: Endoscopy;  Laterality: N/A;  covid test 3/9, labs prior, prep instr mailed -ml    INSERTION OF BIVENTRICULAR IMPLANTABLE CARDIOVERTER-DEFIBRILLATOR (ICD)  04/2021    NEPHRECTOMY-LAPAROSCOPIC Left 01/12/2016    PERITONEAL CATHETER INSERTION      Permacath insertion  01/12/2017    PLACEMENT OF ARTERIOVENOUS GRAFT  12/28/2022    Procedure: INSERTION, GRAFT, ARTERIOVENOUS;  Surgeon: FARIDEH Muñoz III, MD;  Location: [facility] OR 2ND FLR;  Service: Peripheral Vascular;;    REVISION OF PROCEDURE INVOLVING ARTERIOVENOUS GRAFT Left 12/28/2022    Procedure: REVISION, PROCEDURE INVOLVING ARTERIOVENOUS GRAFT;  Surgeon: FARIDEH Muñoz III, MD;  Location: [facility] OR 2ND FLR;  Service: Peripheral Vascular;  Laterality: Left;    SALPINGOOPHORECTOMY Right 2016    KJB---DAVINCI    TONSILLECTOMY      TUBAL LIGATION           Current Outpatient Medications:     acetaminophen (TYLENOL) 500 MG tablet, Take 500 mg by mouth every 6 (six) hours as needed for Pain., Disp: , Rfl:     apixaban (ELIQUIS) 2.5 mg Tab, Take 1 tablet (2.5 mg total) by mouth 2 (two) times daily., Disp: 60 tablet, Rfl: 11    CABOMETYX 60 mg Tab, TAKE ONE TABLET BY MOUTH ONCE DAILY AT THE SAME TIME ON AN EMPTY STOMACH AT LEAST 1 HOUR BEFORE OR 2 HOURS AFTER EATING. AVOID GRAPEFRUIT PRODUCTS, Disp: 30 tablet, Rfl: 4    cloNIDine 0.3 mg/24 hr td ptwk (CATAPRES) 0.3 mg/24 hr, Place 1 patch onto the skin every 7 days., Disp: 4 patch, Rfl: 11    epoetin david-epbx (RETACRIT INJ), Epoetin david - epbx (Retacrit), Disp: , Rfl:     gabapentin (NEURONTIN) 100 MG capsule, Take 1 capsule (100 mg total) by mouth once daily., Disp: 30 capsule, Rfl: 11    HYDROcodone-acetaminophen (NORCO) 5-325 mg per tablet, Take 1 tablet by mouth every 4 (four) hours as needed for Pain. Causes drowsiness. Do not drive or operate machinery with this medication. Do not drink alcohol with this medication. Causes constipation. Take with stool softener., Disp: 10 tablet, Rfl: 0     lidocaine-prilocaine (EMLA) cream, APPLY ATLEAST 30 MINUTES BEFORE TREATMENT 3 TIMES A WEEK, Disp: 30 g, Rfl: 11    metoprolol succinate (TOPROL-XL) 100 MG 24 hr tablet, Take 1/2 tablet (50mg) once daily, Disp: 90 tablet, Rfl: 3    mv,Ca,min-folic acid-vit K1 (ONE-A-DAY WOMEN'S 50 PLUS) 400-20 mcg Tab, Take 1 tablet by mouth once daily., Disp: , Rfl:     promethazine (PHENERGAN) 25 MG tablet, Take 1 tablet (25 mg total) by mouth every 6 (six) hours as needed for Nausea., Disp: 15 tablet, Rfl: 0    sacubitriL-valsartan (ENTRESTO) 49-51 mg per tablet, Take 1 tablet by mouth 2 (two) times daily., Disp: 60 tablet, Rfl: 11    sodium chloride 0.9% SolP 100 mL with iron sucrose 100 mg iron/5 mL Soln 100 mg, 50 mg every Tues, Thurs, Sat., Disp: , Rfl:     hydrALAZINE (APRESOLINE) 100 MG tablet, Take 1 tablet (100 mg total) by mouth every 12 (twelve) hours., Disp: 180 tablet, Rfl: 2    Review of patient's allergies indicates:   Allergen Reactions    Coreg [carvedilol] Other (See Comments)     Nausea/vomiting    Allopurinol      Other reaction(s): abnormal transaminases       Family History   Problem Relation Age of Onset    Hypertension Mother     Diabetes Father     Kidney disease Father     No Known Problems Son     No Known Problems Son     Diabetes Maternal Grandfather     No Known Problems Sister     No Known Problems Brother     No Known Problems Maternal Grandmother     No Known Problems Paternal Grandmother     No Known Problems Paternal Grandfather     Heart disease Sister     No Known Problems Sister     No Known Problems Brother     No Known Problems Maternal Aunt     No Known Problems Maternal Uncle     No Known Problems Paternal Aunt     No Known Problems Paternal Uncle     Breast cancer Neg Hx     Colon cancer Neg Hx     Cancer Neg Hx     Stroke Neg Hx     Amblyopia Neg Hx     Blindness Neg Hx     Cataracts Neg Hx     Glaucoma Neg Hx     Macular degeneration Neg Hx     Retinal detachment Neg Hx     Strabismus  "Neg Hx     Thyroid disease Neg Hx        Social History     Tobacco Use    Smoking status: Never    Smokeless tobacco: Never   Substance Use Topics    Alcohol use: No     Comment: . 2 children. works at [employer].    Drug use: No       PHYSICAL EXAM:   BP (!) 107/59 (BP Location: Right arm, Patient Position: Sitting, BP Method: Large (Automatic))   Pulse 65   Temp 98.5 °F (36.9 °C) (Oral)   Ht 5' 4" (1.626 m)   Wt 44 kg (97 lb)   LMP 10/24/2016   BMI 16.65 kg/m²   Constitutional:  Alert,   Well-appearing  In no distress.   Neurological: Normal speech  no focal findings  CN II - XII grossly intact.    Psychiatric: Mood and affect appropriate and symmetric.   HEENT: Normocephalic / atraumatic  PERRLA  Midline trachea  No scars across the neck   Cardiac: Regular rate and rhythm.   Pulmonary: Normal pulmonary effort.   Abdomen: Soft, not distended.     Skin: Warm and well perfused.    Vascular:  Radial pulses are nonpalpable bilaterally.   Extremities/  Musculoskeletal: No edema.   Left arm demonstrates moderate erythema over the old excluded AV graft, the laterally tunneled new graft has no erythema and a good thrill     IMAGING:  Prior Duplex of the left AV graft showed to be patent without stenosis with a flow volume of 1.78 L      IMPRESSION:  Infection, excluded left AV graft    PLAN   Keflex 500 mg p.o. this afternoon and evening  Excision, left excluded AV graft, possible PermCath, tomorrow 01/25/2023  Regional block    I have explained the risks, benefits and alternatives for this procedure in detail.  The patient voices understanding and all questions have be answered, and agrees to proceed with the procedure.     MANPREET Muñoz III, MD, FACS  Professor and Chief, Vascular and Endovascular Surgery          "

## 2023-01-24 NOTE — H&P (VIEW-ONLY)
VASCULAR SURGERY SERVICE    CHIEF COMPLAINT:  Functional left AV graft dysfunctional left AV graft    HISTORY OF PRESENT ILLNESS: Eva Bloom is a 57 y.o. female end-stage renal disease, with multiple serious medical comorbidities including admission for sepsis 2 months ago, EF 20%, AFib on Eliquis, who is having increasing bleeding after her dialysis runs.  I placed a left upper arm graft January 2017 followed by a covered stent placement of the outflow tract in March 2017.  She has had uninterrupted use of this graft ever since.    Recent weeks she is had increased bleeding after dialysis sticks    1/13/2023:  This is a 1st postoperative visit after revision, left AV graft with long interposition Accu Seal 12/28/2022.  She is using the revised graft without any difficulty    1/24/2023:  She now returns with pain and erythema at her old excluded left AV graft.  She continues to use the new graft without difficulty which was tunneled laterally.  She last dialyzed today.  She also has a cold    Past Medical History:   Diagnosis Date    Anemia     Bronchitis 03/01/2017    Cancer 2016    kidney cancer    CHF (congestive heart failure), NYHA class II, chronic, systolic     CMV (cytomegalovirus) antibody positive     Essential hypertension 9/23/2015    H/O herpes simplex type 2 infection     Herpes simplex type 1 antibody positive     History of kidney cancer     s/p left nephrectomy 1/2016    Hyperparathyroidism, unspecified     Hyperuricemia without signs of inflammatory arthritis and tophaceous disease     Kidney stones     LGSIL (low grade squamous intraepithelial dysplasia)     Myocardiopathy 7/21/2017    Prediabetes     Proteinuria     Renal disorder     Thyroid nodule     Urate nephropathy        Past Surgical History:   Procedure Laterality Date    BREAST CYST EXCISION      COLONOSCOPY N/A 11/12/2015    COLONOSCOPY N/A 3/12/2021    Procedure: COLONOSCOPY;  Surgeon: Brendon Lanier MD;  Location: [facility]  ENDO;  Service: Endoscopy;  Laterality: N/A;  covid test 3/9, labs prior, prep instr mailed -ml    INSERTION OF BIVENTRICULAR IMPLANTABLE CARDIOVERTER-DEFIBRILLATOR (ICD)  04/2021    NEPHRECTOMY-LAPAROSCOPIC Left 01/12/2016    PERITONEAL CATHETER INSERTION      Permacath insertion  01/12/2017    PLACEMENT OF ARTERIOVENOUS GRAFT  12/28/2022    Procedure: INSERTION, GRAFT, ARTERIOVENOUS;  Surgeon: FARIDEH Muñoz III, MD;  Location: [facility] OR 2ND FLR;  Service: Peripheral Vascular;;    REVISION OF PROCEDURE INVOLVING ARTERIOVENOUS GRAFT Left 12/28/2022    Procedure: REVISION, PROCEDURE INVOLVING ARTERIOVENOUS GRAFT;  Surgeon: FARIDEH Muñoz III, MD;  Location: [facility] OR 2ND FLR;  Service: Peripheral Vascular;  Laterality: Left;    SALPINGOOPHORECTOMY Right 2016    KJB---DAVINCI    TONSILLECTOMY      TUBAL LIGATION           Current Outpatient Medications:     acetaminophen (TYLENOL) 500 MG tablet, Take 500 mg by mouth every 6 (six) hours as needed for Pain., Disp: , Rfl:     apixaban (ELIQUIS) 2.5 mg Tab, Take 1 tablet (2.5 mg total) by mouth 2 (two) times daily., Disp: 60 tablet, Rfl: 11    CABOMETYX 60 mg Tab, TAKE ONE TABLET BY MOUTH ONCE DAILY AT THE SAME TIME ON AN EMPTY STOMACH AT LEAST 1 HOUR BEFORE OR 2 HOURS AFTER EATING. AVOID GRAPEFRUIT PRODUCTS, Disp: 30 tablet, Rfl: 4    cloNIDine 0.3 mg/24 hr td ptwk (CATAPRES) 0.3 mg/24 hr, Place 1 patch onto the skin every 7 days., Disp: 4 patch, Rfl: 11    epoetin david-epbx (RETACRIT INJ), Epoetin david - epbx (Retacrit), Disp: , Rfl:     gabapentin (NEURONTIN) 100 MG capsule, Take 1 capsule (100 mg total) by mouth once daily., Disp: 30 capsule, Rfl: 11    HYDROcodone-acetaminophen (NORCO) 5-325 mg per tablet, Take 1 tablet by mouth every 4 (four) hours as needed for Pain. Causes drowsiness. Do not drive or operate machinery with this medication. Do not drink alcohol with this medication. Causes constipation. Take with stool softener., Disp: 10 tablet, Rfl: 0     lidocaine-prilocaine (EMLA) cream, APPLY ATLEAST 30 MINUTES BEFORE TREATMENT 3 TIMES A WEEK, Disp: 30 g, Rfl: 11    metoprolol succinate (TOPROL-XL) 100 MG 24 hr tablet, Take 1/2 tablet (50mg) once daily, Disp: 90 tablet, Rfl: 3    mv,Ca,min-folic acid-vit K1 (ONE-A-DAY WOMEN'S 50 PLUS) 400-20 mcg Tab, Take 1 tablet by mouth once daily., Disp: , Rfl:     promethazine (PHENERGAN) 25 MG tablet, Take 1 tablet (25 mg total) by mouth every 6 (six) hours as needed for Nausea., Disp: 15 tablet, Rfl: 0    sacubitriL-valsartan (ENTRESTO) 49-51 mg per tablet, Take 1 tablet by mouth 2 (two) times daily., Disp: 60 tablet, Rfl: 11    sodium chloride 0.9% SolP 100 mL with iron sucrose 100 mg iron/5 mL Soln 100 mg, 50 mg every Tues, Thurs, Sat., Disp: , Rfl:     hydrALAZINE (APRESOLINE) 100 MG tablet, Take 1 tablet (100 mg total) by mouth every 12 (twelve) hours., Disp: 180 tablet, Rfl: 2    Review of patient's allergies indicates:   Allergen Reactions    Coreg [carvedilol] Other (See Comments)     Nausea/vomiting    Allopurinol      Other reaction(s): abnormal transaminases       Family History   Problem Relation Age of Onset    Hypertension Mother     Diabetes Father     Kidney disease Father     No Known Problems Son     No Known Problems Son     Diabetes Maternal Grandfather     No Known Problems Sister     No Known Problems Brother     No Known Problems Maternal Grandmother     No Known Problems Paternal Grandmother     No Known Problems Paternal Grandfather     Heart disease Sister     No Known Problems Sister     No Known Problems Brother     No Known Problems Maternal Aunt     No Known Problems Maternal Uncle     No Known Problems Paternal Aunt     No Known Problems Paternal Uncle     Breast cancer Neg Hx     Colon cancer Neg Hx     Cancer Neg Hx     Stroke Neg Hx     Amblyopia Neg Hx     Blindness Neg Hx     Cataracts Neg Hx     Glaucoma Neg Hx     Macular degeneration Neg Hx     Retinal detachment Neg Hx     Strabismus  "Neg Hx     Thyroid disease Neg Hx        Social History     Tobacco Use    Smoking status: Never    Smokeless tobacco: Never   Substance Use Topics    Alcohol use: No     Comment: . 2 children. works at [employer].    Drug use: No       PHYSICAL EXAM:   BP (!) 107/59 (BP Location: Right arm, Patient Position: Sitting, BP Method: Large (Automatic))   Pulse 65   Temp 98.5 °F (36.9 °C) (Oral)   Ht 5' 4" (1.626 m)   Wt 44 kg (97 lb)   LMP 10/24/2016   BMI 16.65 kg/m²   Constitutional:  Alert,   Well-appearing  In no distress.   Neurological: Normal speech  no focal findings  CN II - XII grossly intact.    Psychiatric: Mood and affect appropriate and symmetric.   HEENT: Normocephalic / atraumatic  PERRLA  Midline trachea  No scars across the neck   Cardiac: Regular rate and rhythm.   Pulmonary: Normal pulmonary effort.   Abdomen: Soft, not distended.     Skin: Warm and well perfused.    Vascular:  Radial pulses are nonpalpable bilaterally.   Extremities/  Musculoskeletal: No edema.   Left arm demonstrates moderate erythema over the old excluded AV graft, the laterally tunneled new graft has no erythema and a good thrill     IMAGING:  Prior Duplex of the left AV graft showed to be patent without stenosis with a flow volume of 1.78 L      IMPRESSION:  Infection, excluded left AV graft    PLAN   Keflex 500 mg p.o. this afternoon and evening  Excision, left excluded AV graft, possible PermCath, tomorrow 01/25/2023  Regional block    I have explained the risks, benefits and alternatives for this procedure in detail.  The patient voices understanding and all questions have be answered, and agrees to proceed with the procedure.     MANPREET Muñoz III, MD, FACS  Professor and Chief, Vascular and Endovascular Surgery          "

## 2023-01-25 ENCOUNTER — DOCUMENTATION ONLY (OUTPATIENT)
Dept: ELECTROPHYSIOLOGY | Facility: CLINIC | Age: 58
End: 2023-01-25
Payer: COMMERCIAL

## 2023-01-25 ENCOUNTER — ANESTHESIA (OUTPATIENT)
Dept: SURGERY | Facility: HOSPITAL | Age: 58
DRG: 252 | End: 2023-01-25
Payer: MEDICARE

## 2023-01-25 ENCOUNTER — HOSPITAL ENCOUNTER (INPATIENT)
Facility: HOSPITAL | Age: 58
LOS: 5 days | Discharge: HOME HEALTH | DRG: 252 | End: 2023-01-30
Attending: SURGERY | Admitting: SURGERY
Payer: MEDICARE

## 2023-01-25 DIAGNOSIS — T82.7XXA INFECTED PROSTHETIC VASCULAR GRAFT: ICD-10-CM

## 2023-01-25 DIAGNOSIS — T82.7XXA ARTERIOVENOUS GRAFT INFECTION: Primary | ICD-10-CM

## 2023-01-25 DIAGNOSIS — N18.6 ESRD (END STAGE RENAL DISEASE): ICD-10-CM

## 2023-01-25 LAB
ANION GAP SERPL CALC-SCNC: 11 MMOL/L (ref 8–16)
BASOPHILS # BLD AUTO: 0.03 K/UL (ref 0–0.2)
BASOPHILS NFR BLD: 0.4 % (ref 0–1.9)
BUN SERPL-MCNC: 37 MG/DL (ref 6–20)
CALCIUM SERPL-MCNC: 8.7 MG/DL (ref 8.7–10.5)
CHLORIDE SERPL-SCNC: 103 MMOL/L (ref 95–110)
CO2 SERPL-SCNC: 26 MMOL/L (ref 23–29)
CREAT SERPL-MCNC: 4.9 MG/DL (ref 0.5–1.4)
CREAT SERPL-MCNC: 4.9 MG/DL (ref 0.5–1.4)
CTP QC/QA: YES
DIFFERENTIAL METHOD: ABNORMAL
EOSINOPHIL # BLD AUTO: 0.2 K/UL (ref 0–0.5)
EOSINOPHIL NFR BLD: 2.3 % (ref 0–8)
ERYTHROCYTE [DISTWIDTH] IN BLOOD BY AUTOMATED COUNT: 21 % (ref 11.5–14.5)
EST. GFR  (NO RACE VARIABLE): 9.7 ML/MIN/1.73 M^2
EST. GFR  (NO RACE VARIABLE): 9.7 ML/MIN/1.73 M^2
GLUCOSE SERPL-MCNC: 105 MG/DL (ref 70–110)
GRAM STN SPEC: NORMAL
GRAM STN SPEC: NORMAL
HCT VFR BLD AUTO: 31.1 % (ref 37–48.5)
HGB BLD-MCNC: 9.2 G/DL (ref 12–16)
IMM GRANULOCYTES # BLD AUTO: 0.06 K/UL (ref 0–0.04)
IMM GRANULOCYTES NFR BLD AUTO: 0.9 % (ref 0–0.5)
LYMPHOCYTES # BLD AUTO: 0.6 K/UL (ref 1–4.8)
LYMPHOCYTES NFR BLD: 8.7 % (ref 18–48)
MCH RBC QN AUTO: 29.8 PG (ref 27–31)
MCHC RBC AUTO-ENTMCNC: 29.6 G/DL (ref 32–36)
MCV RBC AUTO: 101 FL (ref 82–98)
MONOCYTES # BLD AUTO: 0.2 K/UL (ref 0.3–1)
MONOCYTES NFR BLD: 2.5 % (ref 4–15)
NEUTROPHILS # BLD AUTO: 5.9 K/UL (ref 1.8–7.7)
NEUTROPHILS NFR BLD: 85.2 % (ref 38–73)
NRBC BLD-RTO: 1 /100 WBC
PLATELET # BLD AUTO: 136 K/UL (ref 150–450)
PMV BLD AUTO: 11.1 FL (ref 9.2–12.9)
POCT GLUCOSE: 109 MG/DL (ref 70–110)
POTASSIUM SERPL-SCNC: 3.7 MMOL/L (ref 3.5–5.1)
RBC # BLD AUTO: 3.09 M/UL (ref 4–5.4)
SARS-COV-2 AG RESP QL IA.RAPID: NEGATIVE
SODIUM SERPL-SCNC: 140 MMOL/L (ref 136–145)
WBC # BLD AUTO: 6.89 K/UL (ref 3.9–12.7)

## 2023-01-25 PROCEDURE — 37000008 HC ANESTHESIA 1ST 15 MINUTES: Performed by: SURGERY

## 2023-01-25 PROCEDURE — 25000003 PHARM REV CODE 250: Performed by: ANESTHESIOLOGY

## 2023-01-25 PROCEDURE — 37000009 HC ANESTHESIA EA ADD 15 MINS: Performed by: SURGERY

## 2023-01-25 PROCEDURE — 25000003 PHARM REV CODE 250: Performed by: SURGERY

## 2023-01-25 PROCEDURE — D9220A PRA ANESTHESIA: Mod: ,,, | Performed by: ANESTHESIOLOGY

## 2023-01-25 PROCEDURE — 87075 CULTR BACTERIA EXCEPT BLOOD: CPT | Performed by: SURGERY

## 2023-01-25 PROCEDURE — 35903 EXCISION GRAFT EXTREMITY: CPT | Mod: 78,GC,, | Performed by: SURGERY

## 2023-01-25 PROCEDURE — 82962 GLUCOSE BLOOD TEST: CPT | Performed by: SURGERY

## 2023-01-25 PROCEDURE — 99233 PR SUBSEQUENT HOSPITAL CARE,LEVL III: ICD-10-PCS | Mod: ,,, | Performed by: NURSE PRACTITIONER

## 2023-01-25 PROCEDURE — 25000003 PHARM REV CODE 250: Performed by: STUDENT IN AN ORGANIZED HEALTH CARE EDUCATION/TRAINING PROGRAM

## 2023-01-25 PROCEDURE — 27201423 OPTIME MED/SURG SUP & DEVICES STERILE SUPPLY: Performed by: SURGERY

## 2023-01-25 PROCEDURE — 25000003 PHARM REV CODE 250

## 2023-01-25 PROCEDURE — 87205 SMEAR GRAM STAIN: CPT | Performed by: SURGERY

## 2023-01-25 PROCEDURE — 87186 SC STD MICRODIL/AGAR DIL: CPT | Performed by: SURGERY

## 2023-01-25 PROCEDURE — 63600175 PHARM REV CODE 636 W HCPCS

## 2023-01-25 PROCEDURE — 71000016 HC POSTOP RECOV ADDL HR: Performed by: SURGERY

## 2023-01-25 PROCEDURE — 36000706: Performed by: SURGERY

## 2023-01-25 PROCEDURE — 71000015 HC POSTOP RECOV 1ST HR: Performed by: SURGERY

## 2023-01-25 PROCEDURE — 87070 CULTURE OTHR SPECIMN AEROBIC: CPT | Performed by: SURGERY

## 2023-01-25 PROCEDURE — C1750 CATH, HEMODIALYSIS,LONG-TERM: HCPCS | Performed by: SURGERY

## 2023-01-25 PROCEDURE — 36000707: Performed by: SURGERY

## 2023-01-25 PROCEDURE — 71000039 HC RECOVERY, EACH ADD'L HOUR: Performed by: SURGERY

## 2023-01-25 PROCEDURE — 36558 INSERT TUNNELED CV CATH: CPT | Mod: 78,51,LT,GC | Performed by: SURGERY

## 2023-01-25 PROCEDURE — 80048 BASIC METABOLIC PNL TOTAL CA: CPT | Performed by: STUDENT IN AN ORGANIZED HEALTH CARE EDUCATION/TRAINING PROGRAM

## 2023-01-25 PROCEDURE — 99233 SBSQ HOSP IP/OBS HIGH 50: CPT | Mod: ,,, | Performed by: NURSE PRACTITIONER

## 2023-01-25 PROCEDURE — 77001 FLUOROGUIDE FOR VEIN DEVICE: CPT | Mod: 26,GC,, | Performed by: SURGERY

## 2023-01-25 PROCEDURE — 77001 CHG FLUOROGUIDE CNTRL VEN ACCESS,PLACE,REPLACE,REMOVE: ICD-10-PCS | Mod: 26,GC,, | Performed by: SURGERY

## 2023-01-25 PROCEDURE — D9220A PRA ANESTHESIA: ICD-10-PCS | Mod: ,,, | Performed by: ANESTHESIOLOGY

## 2023-01-25 PROCEDURE — 35903 PR EXCISION, INFEC GRAFT, EXTREMITY: ICD-10-PCS | Mod: 78,GC,, | Performed by: SURGERY

## 2023-01-25 PROCEDURE — 36558 PR INSERT TUNNELED CV CATH W/O PORT OR PUMP: ICD-10-PCS | Mod: 78,51,LT,GC | Performed by: SURGERY

## 2023-01-25 PROCEDURE — 63600175 PHARM REV CODE 636 W HCPCS: Performed by: STUDENT IN AN ORGANIZED HEALTH CARE EDUCATION/TRAINING PROGRAM

## 2023-01-25 PROCEDURE — 87077 CULTURE AEROBIC IDENTIFY: CPT | Performed by: SURGERY

## 2023-01-25 PROCEDURE — C1769 GUIDE WIRE: HCPCS | Performed by: SURGERY

## 2023-01-25 PROCEDURE — 63600175 PHARM REV CODE 636 W HCPCS: Performed by: SURGERY

## 2023-01-25 PROCEDURE — 71000033 HC RECOVERY, INTIAL HOUR: Performed by: SURGERY

## 2023-01-25 PROCEDURE — 85025 COMPLETE CBC W/AUTO DIFF WBC: CPT | Performed by: STUDENT IN AN ORGANIZED HEALTH CARE EDUCATION/TRAINING PROGRAM

## 2023-01-25 PROCEDURE — C1894 INTRO/SHEATH, NON-LASER: HCPCS | Performed by: SURGERY

## 2023-01-25 PROCEDURE — 94761 N-INVAS EAR/PLS OXIMETRY MLT: CPT

## 2023-01-25 PROCEDURE — 20600001 HC STEP DOWN PRIVATE ROOM

## 2023-01-25 DEVICE — CATH GLIDEPATH ACRV 14.5 24X29: Type: IMPLANTABLE DEVICE | Site: SUBCLAVIAN | Status: FUNCTIONAL

## 2023-01-25 RX ORDER — ASPIRIN 81 MG/1
81 TABLET ORAL DAILY
Status: DISCONTINUED | OUTPATIENT
Start: 2023-01-25 | End: 2023-01-30 | Stop reason: HOSPADM

## 2023-01-25 RX ORDER — ATORVASTATIN CALCIUM 40 MG/1
40 TABLET, FILM COATED ORAL DAILY
Status: DISCONTINUED | OUTPATIENT
Start: 2023-01-25 | End: 2023-01-30 | Stop reason: HOSPADM

## 2023-01-25 RX ORDER — FENTANYL CITRATE 50 UG/ML
INJECTION, SOLUTION INTRAMUSCULAR; INTRAVENOUS
Status: DISCONTINUED | OUTPATIENT
Start: 2023-01-25 | End: 2023-01-25

## 2023-01-25 RX ORDER — SODIUM CHLORIDE 0.9 % (FLUSH) 0.9 %
10 SYRINGE (ML) INJECTION
Status: DISCONTINUED | OUTPATIENT
Start: 2023-01-25 | End: 2023-01-25 | Stop reason: HOSPADM

## 2023-01-25 RX ORDER — SODIUM CHLORIDE 9 MG/ML
INJECTION, SOLUTION INTRAVENOUS CONTINUOUS
Status: DISCONTINUED | OUTPATIENT
Start: 2023-01-25 | End: 2023-01-29

## 2023-01-25 RX ORDER — LIDOCAINE HYDROCHLORIDE 10 MG/ML
INJECTION INFILTRATION; PERINEURAL
Status: DISCONTINUED | OUTPATIENT
Start: 2023-01-25 | End: 2023-01-25 | Stop reason: HOSPADM

## 2023-01-25 RX ORDER — ACETAMINOPHEN 500 MG
1000 TABLET ORAL ONCE
Status: COMPLETED | OUTPATIENT
Start: 2023-01-25 | End: 2023-01-25

## 2023-01-25 RX ORDER — LIDOCAINE HYDROCHLORIDE 20 MG/ML
INJECTION, SOLUTION EPIDURAL; INFILTRATION; INTRACAUDAL; PERINEURAL
Status: COMPLETED | OUTPATIENT
Start: 2023-01-25 | End: 2023-01-25

## 2023-01-25 RX ORDER — KETAMINE HCL IN 0.9 % NACL 50 MG/5 ML
SYRINGE (ML) INTRAVENOUS
Status: DISCONTINUED | OUTPATIENT
Start: 2023-01-25 | End: 2023-01-25

## 2023-01-25 RX ORDER — AMLODIPINE BESYLATE 5 MG/1
5 TABLET ORAL DAILY
Status: DISCONTINUED | OUTPATIENT
Start: 2023-01-26 | End: 2023-01-30 | Stop reason: HOSPADM

## 2023-01-25 RX ORDER — HYDRALAZINE HYDROCHLORIDE 50 MG/1
100 TABLET, FILM COATED ORAL EVERY 12 HOURS
Status: DISCONTINUED | OUTPATIENT
Start: 2023-01-25 | End: 2023-01-30 | Stop reason: HOSPADM

## 2023-01-25 RX ORDER — VANCOMYCIN HYDROCHLORIDE 1 G/20ML
INJECTION, POWDER, LYOPHILIZED, FOR SOLUTION INTRAVENOUS
Status: DISCONTINUED | OUTPATIENT
Start: 2023-01-25 | End: 2023-01-25

## 2023-01-25 RX ORDER — OXYCODONE HYDROCHLORIDE 10 MG/1
10 TABLET ORAL EVERY 6 HOURS PRN
Status: DISCONTINUED | OUTPATIENT
Start: 2023-01-25 | End: 2023-01-30 | Stop reason: HOSPADM

## 2023-01-25 RX ORDER — MIDAZOLAM HYDROCHLORIDE 1 MG/ML
INJECTION, SOLUTION INTRAMUSCULAR; INTRAVENOUS
Status: DISCONTINUED | OUTPATIENT
Start: 2023-01-25 | End: 2023-01-25

## 2023-01-25 RX ORDER — CEFAZOLIN SODIUM 1 G/3ML
INJECTION, POWDER, FOR SOLUTION INTRAMUSCULAR; INTRAVENOUS
Status: DISCONTINUED | OUTPATIENT
Start: 2023-01-25 | End: 2023-01-25

## 2023-01-25 RX ORDER — HEPARIN SODIUM 1000 [USP'U]/ML
INJECTION, SOLUTION INTRAVENOUS; SUBCUTANEOUS
Status: DISCONTINUED | OUTPATIENT
Start: 2023-01-25 | End: 2023-01-25 | Stop reason: HOSPADM

## 2023-01-25 RX ORDER — SODIUM CHLORIDE 9 MG/ML
INJECTION, SOLUTION INTRAVENOUS ONCE
Status: COMPLETED | OUTPATIENT
Start: 2023-01-26 | End: 2023-01-26

## 2023-01-25 RX ORDER — HYDROCODONE BITARTRATE AND ACETAMINOPHEN 5; 325 MG/1; MG/1
1 TABLET ORAL EVERY 4 HOURS PRN
Status: DISCONTINUED | OUTPATIENT
Start: 2023-01-25 | End: 2023-01-30 | Stop reason: HOSPADM

## 2023-01-25 RX ORDER — DEXMEDETOMIDINE HYDROCHLORIDE 100 UG/ML
INJECTION, SOLUTION INTRAVENOUS
Status: DISCONTINUED | OUTPATIENT
Start: 2023-01-25 | End: 2023-01-25

## 2023-01-25 RX ORDER — HYDROMORPHONE HYDROCHLORIDE 1 MG/ML
0.2 INJECTION, SOLUTION INTRAMUSCULAR; INTRAVENOUS; SUBCUTANEOUS EVERY 5 MIN PRN
Status: DISCONTINUED | OUTPATIENT
Start: 2023-01-25 | End: 2023-01-25 | Stop reason: HOSPADM

## 2023-01-25 RX ORDER — PROCHLORPERAZINE EDISYLATE 5 MG/ML
5 INJECTION INTRAMUSCULAR; INTRAVENOUS EVERY 30 MIN PRN
Status: DISCONTINUED | OUTPATIENT
Start: 2023-01-25 | End: 2023-01-25 | Stop reason: HOSPADM

## 2023-01-25 RX ORDER — METOPROLOL TARTRATE 50 MG/1
50 TABLET ORAL 2 TIMES DAILY
Status: DISCONTINUED | OUTPATIENT
Start: 2023-01-25 | End: 2023-01-30 | Stop reason: HOSPADM

## 2023-01-25 RX ADMIN — DEXMEDETOMIDINE HYDROCHLORIDE 8 MCG: 100 INJECTION, SOLUTION INTRAVENOUS at 08:01

## 2023-01-25 RX ADMIN — SODIUM CHLORIDE: 9 INJECTION, SOLUTION INTRAVENOUS at 07:01

## 2023-01-25 RX ADMIN — METOPROLOL TARTRATE 50 MG: 50 TABLET, FILM COATED ORAL at 09:01

## 2023-01-25 RX ADMIN — DEXMEDETOMIDINE HYDROCHLORIDE 4 MCG: 100 INJECTION, SOLUTION INTRAVENOUS at 09:01

## 2023-01-25 RX ADMIN — LIDOCAINE HYDROCHLORIDE 30 ML: 20 INJECTION, SOLUTION INTRAVENOUS at 08:01

## 2023-01-25 RX ADMIN — VANCOMYCIN HYDROCHLORIDE 1 G: 1 INJECTION, POWDER, LYOPHILIZED, FOR SOLUTION INTRAVENOUS at 08:01

## 2023-01-25 RX ADMIN — FENTANYL CITRATE 25 MCG: 50 INJECTION, SOLUTION INTRAMUSCULAR; INTRAVENOUS at 09:01

## 2023-01-25 RX ADMIN — HYDROCODONE BITARTRATE AND ACETAMINOPHEN 1 TABLET: 5; 325 TABLET ORAL at 12:01

## 2023-01-25 RX ADMIN — MIDAZOLAM 1 MG: 1 INJECTION INTRAMUSCULAR; INTRAVENOUS at 07:01

## 2023-01-25 RX ADMIN — ACETAMINOPHEN 1000 MG: 500 TABLET ORAL at 07:01

## 2023-01-25 RX ADMIN — SODIUM CHLORIDE: 0.9 INJECTION, SOLUTION INTRAVENOUS at 07:01

## 2023-01-25 RX ADMIN — CEFAZOLIN 2 G: 2 INJECTION, POWDER, FOR SOLUTION INTRAMUSCULAR; INTRAVENOUS at 08:01

## 2023-01-25 RX ADMIN — ATORVASTATIN CALCIUM 40 MG: 40 TABLET, FILM COATED ORAL at 12:01

## 2023-01-25 RX ADMIN — PIPERACILLIN SODIUM AND TAZOBACTAM SODIUM 4.5 G: 4; .5 INJECTION, POWDER, LYOPHILIZED, FOR SOLUTION INTRAVENOUS at 01:01

## 2023-01-25 RX ADMIN — Medication 20 MG: at 08:01

## 2023-01-25 RX ADMIN — ASPIRIN 81 MG: 81 TABLET, COATED ORAL at 12:01

## 2023-01-25 RX ADMIN — METOPROLOL TARTRATE 50 MG: 50 TABLET, FILM COATED ORAL at 12:01

## 2023-01-25 RX ADMIN — HYDRALAZINE HYDROCHLORIDE 100 MG: 50 TABLET ORAL at 09:01

## 2023-01-25 RX ADMIN — FENTANYL CITRATE 25 MCG: 50 INJECTION, SOLUTION INTRAMUSCULAR; INTRAVENOUS at 08:01

## 2023-01-25 NOTE — PROGRESS NOTES
Patient has been identified as having an implanted cardiac rhythm device (CRD); the implanted device is a [employer] Sub Q ICD.      No noted pacer dependency.      Planned A/V fistula      DEFIBRILLATORS/NON-DEPENDENT ANY LOCATION    Per protocol,a magnet should be applied directly over the implanted device during entire surgical procedure when electrocautery will be used.    If no electrocautery is planned, magnet application is not needed.          For additional questions, please contact the Arrhythmia Department at Ext [phone].

## 2023-01-25 NOTE — BRIEF OP NOTE
[facility] - Surgery (2nd Fl)  Brief Operative Note    SUMMARY     Surgery Date: 1/25/2023     Surgeon(s) and Role:     * FARIDHE Muñoz III, MD - Primary     * Richard Bunn MD - Fellow    Assisting Surgeon: None    Pre-op Diagnosis:  Infection of AV graft for dialysis [T82.7XXA]    Post-op Diagnosis:  Post-Op Diagnosis Codes:     * Infection of AV graft for dialysis [T82.7XXA]    PROCEDURES:    Excision, old L upper arm AVG   LEFT IJ 14 fr tunneled 24 cm catheter placement     Anesthesia: Regional    Operative Findings: Revised lateral L AVG left in -situ; did not appear appear in continuity with infected old graft  Right IJ occluded by US    Estimated Blood Loss: 30cc             Specimens:   Specimen (24h ago, onward)      None            [id number]

## 2023-01-25 NOTE — PROGRESS NOTES
Pharmacokinetic Initial Assessment: IV Vancomycin    Assessment/Plan:    Patient with ESRD on HD (T/Th/Sat). Next session planned for tomorrow.  Vancomycin 1000 mg IV x 1 given in OR this am @0830.  Desired empiric serum trough concentration is 10 to 20 mcg/mL  Draw vancomycin random level on 1/26/23 with am lab and re-dose when random level < 20.  Pharmacy will continue to follow and monitor vancomycin.      Please contact pharmacy at extension [phone] with any questions regarding this assessment.     Thank you for the consult,   Stacy Parker       Patient brief summary:  Eva Bloom is a 57 y.o. female initiated on antimicrobial therapy with IV Vancomycin for treatment of suspected skin & soft tissue infection    Drug Allergies:   Review of patient's allergies indicates:   Allergen Reactions    Coreg [carvedilol] Other (See Comments)     Nausea/vomiting    Allopurinol      Other reaction(s): abnormal transaminases       Actual Body Weight:   44.9 kg    Renal Function:   Estimated Creatinine Clearance: 8.9 mL/min (A) (based on SCr of 4.9 mg/dL (H)).,     Dialysis Method (if applicable):  intermittent HD     CBC (last 72 hours):  Recent Labs   Lab Result Units 01/25/23  1204   WBC K/uL 6.89   Hemoglobin g/dL 9.2*   Hematocrit % 31.1*   Platelets K/uL 136*   Gran % % 85.2*   Lymph % % 8.7*   Mono % % 2.5*   Eosinophil % % 2.3   Basophil % % 0.4   Differential Method  Automated       Metabolic Panel (last 72 hours):  Recent Labs   Lab Result Units 01/25/23  1204   Sodium mmol/L 140   Potassium mmol/L 3.7   Chloride mmol/L 103   CO2 mmol/L 26   Glucose mg/dL 105   BUN mg/dL 37*   Creatinine mg/dL 4.9*  4.9*       Drug levels (last 3 results):  No results for input(s): VANCOMYCINRA, VANCORANDOM, VANCOMYCINPE, VANCOPEAK, VANCOMYCINTR, VANCOTROUGH in the last 72 hours.    Microbiologic Results:  Microbiology Results (last 7 days)       Procedure Component Value Units Date/Time    Aerobic culture [[id number]] Collected:  01/25/23 0830    Order Status: Sent Specimen: Incision site from Arm, Left Updated: 01/25/23 0840    Gram stain [[id number]] Collected: 01/25/23 0830    Order Status: Sent Specimen: Incision site from Arm, Left Updated: 01/25/23 0839    Culture, Anaerobe [[id number]] Collected: 01/25/23 0830    Order Status: Sent Specimen: Incision site from Arm, Left Updated: 01/25/23 0838

## 2023-01-25 NOTE — PLAN OF CARE
Chart reviewed. Pre-op nursing care per orders. Safe surgery checklist complete.  at bedside very attentive to patient. Belongings sent home with . Pacemaker clinic notified and orders received magnet over AICD device if using cautery.

## 2023-01-25 NOTE — SUBJECTIVE & OBJECTIVE
Past Medical History:   Diagnosis Date    Anemia     Bronchitis 03/01/2017    Cancer 2016    kidney cancer    CHF (congestive heart failure), NYHA class II, chronic, systolic     CMV (cytomegalovirus) antibody positive     Essential hypertension 9/23/2015    H/O herpes simplex type 2 infection     Herpes simplex type 1 antibody positive     History of kidney cancer     s/p left nephrectomy 1/2016    Hyperparathyroidism, unspecified     Hyperuricemia without signs of inflammatory arthritis and tophaceous disease     Kidney stones     LGSIL (low grade squamous intraepithelial dysplasia)     Myocardiopathy 7/21/2017    Prediabetes     Proteinuria     Renal disorder     Thyroid nodule     Urate nephropathy        Past Surgical History:   Procedure Laterality Date    BREAST CYST EXCISION      COLONOSCOPY N/A 11/12/2015    COLONOSCOPY N/A 3/12/2021    Procedure: COLONOSCOPY;  Surgeon: Brendon Lanier MD;  Location: [facility];  Service: Endoscopy;  Laterality: N/A;  covid test 3/9, labs prior, prep instr mailed -ml    INSERTION OF BIVENTRICULAR IMPLANTABLE CARDIOVERTER-DEFIBRILLATOR (ICD)  04/2021    NEPHRECTOMY-LAPAROSCOPIC Left 01/12/2016    PERITONEAL CATHETER INSERTION      Permacath insertion  01/12/2017    PLACEMENT OF ARTERIOVENOUS GRAFT  12/28/2022    Procedure: INSERTION, GRAFT, ARTERIOVENOUS;  Surgeon: FARIDEH Muñoz III, MD;  Location: [facility] OR [address] [place] [address];  Service: Peripheral Vascular;;    REVISION OF PROCEDURE INVOLVING ARTERIOVENOUS GRAFT Left 12/28/2022    Procedure: REVISION, PROCEDURE INVOLVING ARTERIOVENOUS GRAFT;  Surgeon: FARIDEH Muñoz III, MD;  Location: [facility] OR [address] [place] [address];  Service: Peripheral Vascular;  Laterality: Left;    SALPINGOOPHORECTOMY Right 2016    KJB---DAVINCI    TONSILLECTOMY      TUBAL LIGATION         Review of patient's allergies indicates:   Allergen Reactions    Coreg [carvedilol] Other (See Comments)     Nausea/vomiting    Allopurinol      Other reaction(s): abnormal transaminases      Current Facility-Administered Medications   Medication Frequency    0.9%  NaCl infusion Continuous    [START ON 1/26/2023] 0.9%  NaCl infusion Once    [START ON 1/26/2023] amLODIPine tablet 5 mg Daily    aspirin EC tablet 81 mg Daily    atorvastatin tablet 40 mg Daily    ceFAZolin 2 g in dextrose 5 % in water (D5W) 5 % 50 mL IVPB (MB+) On Call Procedure    hydrALAZINE tablet 100 mg Q12H    HYDROcodone-acetaminophen 5-325 mg per tablet 1 tablet Q4H PRN    HYDROmorphone injection 0.2 mg Q5 Min PRN    metoprolol tartrate (LOPRESSOR) tablet 50 mg BID    piperacillin-tazobactam (ZOSYN) 4.5 g in dextrose 5 % in water (D5W) 5 % 100 mL IVPB (MB+) Q8H    prochlorperazine injection Soln 5 mg Q30 Min PRN    sodium chloride 0.9% flush 10 mL PRN    vancomycin - pharmacy to dose pharmacy to manage frequency     Family History       Problem Relation (Age of Onset)    Diabetes Father, Maternal Grandfather    Heart disease Sister    Hypertension Mother    Kidney disease Father    No Known Problems Son, Son, Sister, Brother, Maternal Grandmother, Paternal Grandmother, Paternal Grandfather, Sister, Brother, Maternal Aunt, Maternal Uncle, Paternal Aunt, Paternal Uncle          Tobacco Use    Smoking status: Never    Smokeless tobacco: Never   Substance and Sexual Activity    Alcohol use: No     Comment: . 2 children. works at [employer].    Drug use: No    Sexual activity: Yes     Partners: Male     Review of Systems   Unable to perform ROS: Other (Sedation s/p OR not yet worn off)   Objective:     Vital Signs (Most Recent):  Temp: 97.5 °F (36.4 °C) (01/25/23 1020)  Pulse: 63 (01/25/23 1130)  Resp: 12 (01/25/23 1130)  BP: 105/61 (01/25/23 1130)  SpO2: 96 % (01/25/23 1130) Vital Signs (24h Range):  Temp:  [97.5 °F (36.4 °C)-98.5 °F (36.9 °C)] 97.5 °F (36.4 °C)  Pulse:  [63-82] 63  Resp:  [12-18] 12  SpO2:  [94 %-100 %] 96 %  BP: (105-131)/(59-70) 105/61     Weight: 44.9 kg (99 lb) (01/25/23 0646)  Body mass index is 16.99  kg/m².  Body surface area is 1.42 meters squared.    No intake/output data recorded.    Physical Exam  Constitutional:       General: She is not in acute distress.     Appearance: Normal appearance. She is not ill-appearing.   HENT:      Head: Normocephalic and atraumatic.      Nose: Nose normal.      Mouth/Throat:      Mouth: Mucous membranes are moist.      Pharynx: Oropharynx is clear.   Eyes:      Conjunctiva/sclera: Conjunctivae normal.   Cardiovascular:      Rate and Rhythm: Normal rate.      Comments: L IJ TDC   Pulmonary:      Effort: Pulmonary effort is normal.   Abdominal:      General: Abdomen is flat.   Musculoskeletal:      Cervical back: Neck supple.      Right lower leg: No edema.      Left lower leg: No edema.      Comments: Surgical dressing to ALISA   Skin:     General: Skin is warm and dry.   Neurological:      Comments: Groggy from sedation, but arouses to voice        Significant Labs:  CBC: No results for input(s): WBC, RBC, HGB, HCT, PLT, MCV, MCH, MCHC in the last 168 hours.  CMP: No results for input(s): GLU, CALCIUM, ALBUMIN, PROT, NA, K, CO2, CL, BUN, CREATININE, ALKPHOS, ALT, AST, BILITOT in the last 168 hours.  All labs within the past 24 hours have been reviewed.

## 2023-01-25 NOTE — ASSESSMENT & PLAN NOTE
Outpatient HD Information:    -Outpatient HD unit: [facility]   -Nephrologist: MD Tolu  -HD tx days: T/Th/Sat  -HD tx time: 180mins  -Last HD tx prior to presentation: 1/24/22  -HD access: L IJ TDC   -HD modality: iHD   -Residual urine:  -EDW: 45kg     Plan/Recommendations:    -HD tomorrow for metabolic clearance and volume management   -Renal diet, if not NPO   -Strict I/O's and daily weights  -Daily renal function panels   -Renally dose meds

## 2023-01-25 NOTE — INTERVAL H&P NOTE
The patient has been examined and the H&P has been reviewed:    I concur with the findings and no changes have occurred since H&P was written.    Surgery risks, benefits and alternative options discussed and understood by patient/family.          There are no hospital problems to display for this patient.  Brenden Celeste MD PGY7  Vascular Surgery Fellow  [phone]

## 2023-01-25 NOTE — PROGRESS NOTES
Pharmacist Renal Dose Adjustment Note    Eva Bloom is a 57 y.o. female being treated with the medication Zosyn.    Patient Data:    Vital Signs (Most Recent):  Temp: 97.5 °F (36.4 °C) (01/25/23 1020)  Pulse: 77 (01/25/23 1259)  Resp: 16 (01/25/23 1221)  BP: 113/62 (01/25/23 1215)  SpO2: 96 % (01/25/23 1215) Vital Signs (72h Range):  Temp:  [97.5 °F (36.4 °C)-98.5 °F (36.9 °C)]   Pulse:  [62-82]   Resp:  [12-18]   BP: (105-131)/(59-70)   SpO2:  [94 %-100 %]      Recent Labs   Lab 01/25/23  1204   CREATININE 4.9*  4.9*     Serum creatinine: 4.9 mg/dL (H) 01/25/23 1204  Estimated creatinine clearance: 8.9 mL/min (A), on dialysis Tu/Th/Sat    Medication:Zosyn 4.5 gm IV q8hrs will be changed to 4.5 gm IV q12hrs.    Pharmacist's Name: Stacy Parker  Pharmacist's Extension: [phone]

## 2023-01-25 NOTE — OP NOTE
[facility] ([facility])  Brief Operative Note     SUMMARY      Surgery Date: 1/25/2023      Surgeon(s) and Role:     * FARIDEH Muñoz III, MD - Primary     * Richard Bunn MD - Fellow     Assisting Surgeon: None     Pre-op Diagnosis:  Infection of AV graft for dialysis [T82.7XXA]     Post-op Diagnosis:  Post-Op Diagnosis Codes:     * Infection of AV graft for dialysis [T82.7XXA]     PROCEDURES:     Excision, old L upper arm AVG   LEFT IJ 14 fr tunneled 24 cm catheter placement      Indications:  57 year old woman with graft ligation and additional graft placement approximately 1 month ago presenting with cellulitis of the old graft. She was consented for graft excision, tunneled dialysis catheter placement.     Anesthesia: Regional     Operative Findings: Revised lateral L AVG left in -situ; did not appear appear in continuity with infected old graft  Right IJ occluded by US     Estimated Blood Loss: 30cc               Specimens:   Specimen (24h ago, onward)        None     Procedure Details:  After obtaining signed consent, the patient was brought to the operating theater and placed in supine position.  The left arm was anesthetized with a regional block by anesthesia, followed by prepping and draping in the usual sterile fashion.     A surgical time-out was performed confirming patient identity, laterality, procedure.  Preoperative antibiotics were initiated. The old graft was no longer erythematous however there was some purulence spontaneously expressing from the proximal ligation incision. This was cultured prior to any manipulation. We started by making a elliptical incision around the old graft access sites.  This was carried down through the subcutaneous tissues to the graft with electrocautery and sharp dissection.  The graft was concentrically dissected and removed in toto.  The excision bed was noted to be well adherent to the graft.  The pseudoaneurysm overlying the graft was inadvertently  entered which is the only area during our dissection that had purulence with the exception of the incision as previously described.  Of note, the old graft appeared to be separate from the new graft and there was no fluctuance or erythema involving the new graft.  This point, we decided to attempt to treat this with antibiotics and local wound care when an effort to leave the recently placed graft in place.  The wound bed was pulse lavaged with 1 L of normal saline.  Hemostasis was achieved, the wound was packed with saline soaked Kerlix and the wound dressed with sterile dressings.    Drapes were taken down and the neck was evaluated under ultrasound.  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  The left neck was prepped and draped in the usual sterile fashion.  Under ultrasound guidance, the internal jugular vein was accessed with a micropuncture needle followed by 0.018 inch Mandril wire and micropuncture sheath.  Through this the 0.035 inch glidewire was advanced under fluoroscopic guidance ensuring that it maneuvered into the inferior vena cava.  A skin incision was made at the access site and the tract was dilated serially with dilators.  The peel-away sheath was advanced under fluoroscopic guidance appeared to maneuver the transition from the innominate vein to superior vena cava without any resistance. The dilator and wire were removed. An incision was made over the left chest and a 14 Swedish 24 cm angled dialysis catheter was tunneled to our neck incision.  The catheter was advanced through the peel-away sheath and noted to be proximally in the cavoatrial junction or just distal to it into the inferior vena cava.  The catheter appeared to be kinked at the skin insertion so the 0.035 glidewire was advanced once again through the catheter into the IVC and the catheter was 1st advanced and then retracted  back and the kink straightened.  Both ports were aspirated and irrigated aggressively without any resistance.  Both ports were instilled with heparin and caps applied.  The skin incision was closed with a Monocryl 4-0 simple sutures.  The catheter was sutured into place and dressed sterilely.    Sponge and instrument count was correct x2.     The patient tolerated the procedure and was transported to the PACU in good condition.    The catheter is okay to use.    Dr. Muñoz was present for critical portions of the procedure.     Richard Troy Hilaire  Vascular Surgery Fellow, PGY-6  [phone]

## 2023-01-25 NOTE — HPI
Eva Bloom is a 57 y.o. female with medical history of ESRD on HD (T/Th/Sat), combined systolic and diastolic congestive heart failure (EF 20%), and AFib on Eliquis who presents for infection of L upper arm AVG. S/P excision on L upper arm AVG, followed by placement of L IJ permacath. Nephrology was consulted for management of ESRD/HD while inpatient.

## 2023-01-25 NOTE — NURSING TRANSFER
Nursing Transfer Note      1/25/2023     Reason patient is being transferred: to assigned room post recovery    Transfer To: 1024    Transfer via stretcher    Transfer with room air    Transported by escort    Medicines sent: zosyn infusing    Any special needs or follow-up needed: renal diet, monitor dsg    Chart send with patient: Yes    Notified: spouse    Patient reassessed at: to be done by receiving RN (date, time)

## 2023-01-25 NOTE — TRANSFER OF CARE
"Anesthesia Transfer of Care Note    Patient: Eva Bloom    Procedure(s) Performed: Procedure(s) (LRB):  REMOVAL, GRAFT, ARTERIOVENOUS, UPPER EXTREMITY (Left)  INSERTION, DUAL LUMEN CATHETER WITH PORT, WITH IMAGING GUIDANCE (N/A)    Patient location: PACU    Anesthesia Type: MAC    Transport from OR: Transported from OR on 2-3 L/min O2 by NC with adequate spontaneous ventilation    Post pain: adequate analgesia    Post assessment: no apparent anesthetic complications    Post vital signs: stable    Level of consciousness: awake and alert    Nausea/Vomiting: no nausea/vomiting    Complications: none    Transfer of care protocol was followed      Last vitals:   Visit Vitals  /63   Pulse 65   Temp 36.4 °C (97.5 °F) (Temporal)   Resp 14   Ht 5' 4" (1.626 m)   Wt 44.9 kg (99 lb)   LMP 10/24/2016   SpO2 100%   Breastfeeding No   BMI 16.99 kg/m²     "

## 2023-01-25 NOTE — ANESTHESIA PROCEDURE NOTES
Left Supraclavicular Single Injection + ICB    Patient location during procedure: OR    Reason for block: primary anesthetic    Diagnosis: Left AVF   Start time: 1/25/2023 8:03 AM  Timeout: 1/25/2023 8:02 AM   End time: 1/25/2023 8:08 AM    Staffing  Authorizing Provider: To Connolly MD  Performing Provider: Dorothea Manning MD    Preanesthetic Checklist  Completed: patient identified, IV checked, site marked, risks and benefits discussed, surgical consent, monitors and equipment checked, pre-op evaluation and timeout performed  Peripheral Block  Patient position: supine  Prep: ChloraPrep  Patient monitoring: heart rate, cardiac monitor, continuous pulse ox, continuous capnometry and frequent blood pressure checks  Block type: supraclavicular  Laterality: left  Injection technique: single shot  Needle  Needle type: Echogenic   Needle gauge: 22 G  Needle length: 2 in  Needle localization: anatomical landmarks and ultrasound guidance   -ultrasound image captured on disc.  Assessment  Injection assessment: negative aspiration, negative parasthesia and local visualized surrounding nerve  Paresthesia pain: none  Heart rate change: no  Slow fractionated injection: yes  Pain Tolerance: comfortable throughout block and no complaints  Medications:    Medications: lidocaine (PF) 20 mg/mL (2%) injection - Other   30 mL - 1/25/2023 8:08:00 AM    Additional Notes  Intercostal brachial field block performed with mepivacaine 1.5% 10ml for additional coverage.    VSS.  See anesthesia record.  Patient tolerated procedure well.

## 2023-01-25 NOTE — CONSULTS
[facility] ([facility])  Nephrology  Consult Note    Patient Name: Eva Bloom  MRN: [id number]  Admission Date: 1/25/2023  Hospital Length of Stay: 0 days  Attending Provider: FARIDEH Muñoz III, MD   Primary Care Physician: Sowmya Mccain MD  Principal Problem:Arteriovenous graft infection    Inpatient consult to Nephrology  Consult performed by: Jose Elias Brock NP  Consult ordered by: Richard Bunn MD  Reason for consult: ESRD/HD         Subjective:     HPI: Eva Bloom is a 57 y.o. female with medical history of ESRD on HD (T/Th/Sat), combined systolic and diastolic congestive heart failure (EF 20%), and AFib on Eliquis who presents for infection of L upper arm AVG. S/P excision on L upper arm AVG, followed by placement of L IJ permacath. Nephrology was consulted for management of ESRD/HD while inpatient.       Past Medical History:   Diagnosis Date    Anemia     Bronchitis 03/01/2017    Cancer 2016    kidney cancer    CHF (congestive heart failure), NYHA class II, chronic, systolic     CMV (cytomegalovirus) antibody positive     Essential hypertension 9/23/2015    H/O herpes simplex type 2 infection     Herpes simplex type 1 antibody positive     History of kidney cancer     s/p left nephrectomy 1/2016    Hyperparathyroidism, unspecified     Hyperuricemia without signs of inflammatory arthritis and tophaceous disease     Kidney stones     LGSIL (low grade squamous intraepithelial dysplasia)     Myocardiopathy 7/21/2017    Prediabetes     Proteinuria     Renal disorder     Thyroid nodule     Urate nephropathy        Past Surgical History:   Procedure Laterality Date    BREAST CYST EXCISION      COLONOSCOPY N/A 11/12/2015    COLONOSCOPY N/A 3/12/2021    Procedure: COLONOSCOPY;  Surgeon: Brendon Lanier MD;  [facility];  Service: Endoscopy;  Laterality: N/A;  covid test 3/9, labs prior, prep instr mailed -ml    INSERTION OF BIVENTRICULAR IMPLANTABLE  CARDIOVERTER-DEFIBRILLATOR (ICD)  04/2021    NEPHRECTOMY-LAPAROSCOPIC Left 01/12/2016    PERITONEAL CATHETER INSERTION      Permacath insertion  01/12/2017    PLACEMENT OF ARTERIOVENOUS GRAFT  12/28/2022    Procedure: INSERTION, GRAFT, ARTERIOVENOUS;  Surgeon: FARIDEH Muñoz III, MD;  Location: [facility] OR [address] [place] [address];  Service: Peripheral Vascular;;    REVISION OF PROCEDURE INVOLVING ARTERIOVENOUS GRAFT Left 12/28/2022    Procedure: REVISION, PROCEDURE INVOLVING ARTERIOVENOUS GRAFT;  Surgeon: FARIDEH Muñoz III, MD;  Location: [facility] OR [address] [place] [address];  Service: Peripheral Vascular;  Laterality: Left;    SALPINGOOPHORECTOMY Right 2016    KJB---DAVINCI    TONSILLECTOMY      TUBAL LIGATION         Review of patient's allergies indicates:   Allergen Reactions    Coreg [carvedilol] Other (See Comments)     Nausea/vomiting    Allopurinol      Other reaction(s): abnormal transaminases     Current Facility-Administered Medications   Medication Frequency    0.9%  NaCl infusion Continuous    [START ON 1/26/2023] 0.9%  NaCl infusion Once    [START ON 1/26/2023] amLODIPine tablet 5 mg Daily    aspirin EC tablet 81 mg Daily    atorvastatin tablet 40 mg Daily    ceFAZolin 2 g in dextrose 5 % in water (D5W) 5 % 50 mL IVPB (MB+) On Call Procedure    hydrALAZINE tablet 100 mg Q12H    HYDROcodone-acetaminophen 5-325 mg per tablet 1 tablet Q4H PRN    HYDROmorphone injection 0.2 mg Q5 Min PRN    metoprolol tartrate (LOPRESSOR) tablet 50 mg BID    piperacillin-tazobactam (ZOSYN) 4.5 g in dextrose 5 % in water (D5W) 5 % 100 mL IVPB (MB+) Q8H    prochlorperazine injection Soln 5 mg Q30 Min PRN    sodium chloride 0.9% flush 10 mL PRN    vancomycin - pharmacy to dose pharmacy to manage frequency     Family History       Problem Relation (Age of Onset)    Diabetes Father, Maternal Grandfather    Heart disease Sister    Hypertension Mother    Kidney disease Father    No Known Problems Son, Son, Sister, Brother, Maternal  Grandmother, Paternal Grandmother, Paternal Grandfather, Sister, Brother, Maternal Aunt, Maternal Uncle, Paternal Aunt, Paternal Uncle          Tobacco Use    Smoking status: Never    Smokeless tobacco: Never   Substance and Sexual Activity    Alcohol use: No     Comment: . 2 children. works at [employer].    Drug use: No    Sexual activity: Yes     Partners: Male     Review of Systems   Unable to perform ROS: Other (Sedation s/p OR not yet worn off)   Objective:     Vital Signs (Most Recent):  Temp: 97.5 °F (36.4 °C) (01/25/23 1020)  Pulse: 63 (01/25/23 1130)  Resp: 12 (01/25/23 1130)  BP: 105/61 (01/25/23 1130)  SpO2: 96 % (01/25/23 1130) Vital Signs (24h Range):  Temp:  [97.5 °F (36.4 °C)-98.5 °F (36.9 °C)] 97.5 °F (36.4 °C)  Pulse:  [63-82] 63  Resp:  [12-18] 12  SpO2:  [94 %-100 %] 96 %  BP: (105-131)/(59-70) 105/61     Weight: 44.9 kg (99 lb) (01/25/23 0646)  Body mass index is 16.99 kg/m².  Body surface area is 1.42 meters squared.    No intake/output data recorded.    Physical Exam  Constitutional:       General: She is not in acute distress.     Appearance: Normal appearance. She is not ill-appearing.   HENT:      Head: Normocephalic and atraumatic.      Nose: Nose normal.      Mouth/Throat:      Mouth: Mucous membranes are moist.      Pharynx: Oropharynx is clear.   Eyes:      Conjunctiva/sclera: Conjunctivae normal.   Cardiovascular:      Rate and Rhythm: Normal rate.      Comments: L IJ TDC   Pulmonary:      Effort: Pulmonary effort is normal.   Abdominal:      General: Abdomen is flat.   Musculoskeletal:      Cervical back: Neck supple.      Right lower leg: No edema.      Left lower leg: No edema.      Comments: Surgical dressing to ALISA   Skin:     General: Skin is warm and dry.   Neurological:      Comments: Groggy from sedation, but arouses to voice        Significant Labs:  CBC: No results for input(s): WBC, RBC, HGB, HCT, PLT, MCV, MCH, MCHC in the last 168 hours.  CMP: No results for  input(s): GLU, CALCIUM, ALBUMIN, PROT, NA, K, CO2, CL, BUN, CREATININE, ALKPHOS, ALT, AST, BILITOT in the last 168 hours.  All labs within the past 24 hours have been reviewed.        Assessment/Plan:     * Arteriovenous graft infection  -Plan per vascular surgery     Chronic kidney disease-mineral and bone disorder  -Obtain phos level and evaluate need for binders, once patient is no longer NPO     Anemia in ESRD (end-stage renal disease)  -Transfuse for Hg <7.0  -Target Hg of 10-12    ESRD (end stage renal disease) on dialysis  Outpatient HD Information:    -Outpatient HD unit: [facility]   -Nephrologist: MD Tolu  -HD tx days: T/Th/Sat  -HD tx time: 180mins  -Last HD tx prior to presentation: 1/24/22  -HD access: L IJ TDC   -HD modality: iHD   -Residual urine:  -EDW: 45kg     Plan/Recommendations:    -HD tomorrow for metabolic clearance and volume management   -Renal diet, if not NPO   -Strict I/O's and daily weights  -Daily renal function panels   -Renally dose meds            Thank you for your consult. I will follow-up with patient. Please contact us if you have any additional questions.    Jose Elias Brock, NP  Nephrology  [facility] - Surgery ([facility])

## 2023-01-25 NOTE — ANESTHESIA POSTPROCEDURE EVALUATION
Anesthesia Post Evaluation    Patient: Eva Bloom    Procedure(s) Performed: Procedure(s) (LRB):  REMOVAL, GRAFT, ARTERIOVENOUS, UPPER EXTREMITY (Left)  INSERTION, DUAL LUMEN CATHETER WITH PORT, WITH IMAGING GUIDANCE (N/A)    Final Anesthesia Type: regional      Patient location during evaluation: PACU  Patient participation: Yes- Able to Participate  Level of consciousness: awake  Post-procedure vital signs: reviewed and stable  Pain management: adequate  Airway patency: patent    PONV status at discharge: No PONV  Anesthetic complications: no      Cardiovascular status: blood pressure returned to baseline and stable  Respiratory status: spontaneous ventilation  Hydration status: euvolemic  Follow-up not needed.          Vitals Value Taken Time   /77 01/25/23 1515   Temp 36.4 °C (97.5 °F) 01/25/23 1020   Pulse 78 01/25/23 1530   Resp 17 01/25/23 1530   SpO2 100 % 01/25/23 1530   Vitals shown include unvalidated device data.      Event Time   Out of Recovery 11:30:00         Pain/Mimi Score: Pain Rating Prior to Med Admin: 5 (1/25/2023 12:21 PM)  Mimi Score: 9 (1/25/2023 11:30 AM)

## 2023-01-26 LAB
ALBUMIN SERPL BCP-MCNC: 1.7 G/DL (ref 3.5–5.2)
ANION GAP SERPL CALC-SCNC: 15 MMOL/L (ref 8–16)
BUN SERPL-MCNC: 51 MG/DL (ref 6–20)
CALCIUM SERPL-MCNC: 8.2 MG/DL (ref 8.7–10.5)
CHLORIDE SERPL-SCNC: 100 MMOL/L (ref 95–110)
CO2 SERPL-SCNC: 24 MMOL/L (ref 23–29)
CREAT SERPL-MCNC: 5.9 MG/DL (ref 0.5–1.4)
EST. GFR  (NO RACE VARIABLE): 7.8 ML/MIN/1.73 M^2
GLUCOSE SERPL-MCNC: 119 MG/DL (ref 70–110)
HBV SURFACE AG SERPL QL IA: NORMAL
PHOSPHATE SERPL-MCNC: 5.2 MG/DL (ref 2.7–4.5)
POTASSIUM SERPL-SCNC: 3.9 MMOL/L (ref 3.5–5.1)
SODIUM SERPL-SCNC: 139 MMOL/L (ref 136–145)
VANCOMYCIN SERPL-MCNC: 13.9 UG/ML

## 2023-01-26 PROCEDURE — 63600175 PHARM REV CODE 636 W HCPCS: Performed by: INTERNAL MEDICINE

## 2023-01-26 PROCEDURE — 80202 ASSAY OF VANCOMYCIN: CPT | Performed by: SURGERY

## 2023-01-26 PROCEDURE — 99223 PR INITIAL HOSPITAL CARE,LEVL III: ICD-10-PCS | Mod: GC,,, | Performed by: INTERNAL MEDICINE

## 2023-01-26 PROCEDURE — 36410 VNPNXR 3YR/> PHY/QHP DX/THER: CPT

## 2023-01-26 PROCEDURE — 20600001 HC STEP DOWN PRIVATE ROOM

## 2023-01-26 PROCEDURE — 25000003 PHARM REV CODE 250

## 2023-01-26 PROCEDURE — 87340 HEPATITIS B SURFACE AG IA: CPT | Performed by: SURGERY

## 2023-01-26 PROCEDURE — 76937 US GUIDE VASCULAR ACCESS: CPT

## 2023-01-26 PROCEDURE — 99223 1ST HOSP IP/OBS HIGH 75: CPT | Mod: GC,,, | Performed by: INTERNAL MEDICINE

## 2023-01-26 PROCEDURE — 25000003 PHARM REV CODE 250: Performed by: STUDENT IN AN ORGANIZED HEALTH CARE EDUCATION/TRAINING PROGRAM

## 2023-01-26 PROCEDURE — 63600175 PHARM REV CODE 636 W HCPCS: Performed by: SURGERY

## 2023-01-26 PROCEDURE — 25000003 PHARM REV CODE 250: Performed by: SURGERY

## 2023-01-26 PROCEDURE — 63600175 PHARM REV CODE 636 W HCPCS: Performed by: STUDENT IN AN ORGANIZED HEALTH CARE EDUCATION/TRAINING PROGRAM

## 2023-01-26 PROCEDURE — 80069 RENAL FUNCTION PANEL: CPT | Performed by: SURGERY

## 2023-01-26 PROCEDURE — C1751 CATH, INF, PER/CENT/MIDLINE: HCPCS

## 2023-01-26 PROCEDURE — 80100016 HC MAINTENANCE HEMODIALYSIS

## 2023-01-26 PROCEDURE — 99233 SBSQ HOSP IP/OBS HIGH 50: CPT | Mod: ,,, | Performed by: INTERNAL MEDICINE

## 2023-01-26 PROCEDURE — 25000003 PHARM REV CODE 250: Performed by: NURSE PRACTITIONER

## 2023-01-26 PROCEDURE — 36415 COLL VENOUS BLD VENIPUNCTURE: CPT | Performed by: SURGERY

## 2023-01-26 PROCEDURE — 99233 PR SUBSEQUENT HOSPITAL CARE,LEVL III: ICD-10-PCS | Mod: ,,, | Performed by: INTERNAL MEDICINE

## 2023-01-26 RX ORDER — GUAIFENESIN/DEXTROMETHORPHAN 100-10MG/5
5 SYRUP ORAL EVERY 6 HOURS PRN
Status: DISCONTINUED | OUTPATIENT
Start: 2023-01-26 | End: 2023-01-30 | Stop reason: HOSPADM

## 2023-01-26 RX ORDER — MUPIROCIN 20 MG/G
OINTMENT TOPICAL 2 TIMES DAILY
Status: DISCONTINUED | OUTPATIENT
Start: 2023-01-26 | End: 2023-01-30 | Stop reason: HOSPADM

## 2023-01-26 RX ORDER — HEPARIN SODIUM 1000 [USP'U]/ML
1000 INJECTION, SOLUTION INTRAVENOUS; SUBCUTANEOUS
Status: DISCONTINUED | OUTPATIENT
Start: 2023-01-26 | End: 2023-01-30 | Stop reason: HOSPADM

## 2023-01-26 RX ADMIN — PIPERACILLIN SODIUM AND TAZOBACTAM SODIUM 4.5 G: 4; .5 INJECTION, POWDER, LYOPHILIZED, FOR SOLUTION INTRAVENOUS at 02:01

## 2023-01-26 RX ADMIN — AMLODIPINE BESYLATE 5 MG: 5 TABLET ORAL at 11:01

## 2023-01-26 RX ADMIN — OXYCODONE HYDROCHLORIDE 10 MG: 10 TABLET ORAL at 11:01

## 2023-01-26 RX ADMIN — ASPIRIN 81 MG: 81 TABLET, COATED ORAL at 11:01

## 2023-01-26 RX ADMIN — VANCOMYCIN HYDROCHLORIDE 500 MG: 500 INJECTION, POWDER, LYOPHILIZED, FOR SOLUTION INTRAVENOUS at 01:01

## 2023-01-26 RX ADMIN — ATORVASTATIN CALCIUM 40 MG: 40 TABLET, FILM COATED ORAL at 11:01

## 2023-01-26 RX ADMIN — HEPARIN SODIUM 1000 UNITS: 1000 INJECTION, SOLUTION INTRAVENOUS; SUBCUTANEOUS at 11:01

## 2023-01-26 RX ADMIN — SODIUM CHLORIDE: 9 INJECTION, SOLUTION INTRAVENOUS at 08:01

## 2023-01-26 RX ADMIN — HYDRALAZINE HYDROCHLORIDE 100 MG: 50 TABLET ORAL at 09:01

## 2023-01-26 RX ADMIN — METOPROLOL TARTRATE 50 MG: 50 TABLET, FILM COATED ORAL at 11:01

## 2023-01-26 RX ADMIN — HYDROCODONE BITARTRATE AND ACETAMINOPHEN 1 TABLET: 5; 325 TABLET ORAL at 07:01

## 2023-01-26 RX ADMIN — GUAIFENESIN AND DEXTROMETHORPHAN 5 ML: 100; 10 SYRUP ORAL at 05:01

## 2023-01-26 NOTE — PROGRESS NOTES
Pharmacokinetic Assessment Follow Up: IV Vancomycin    Vancomycin serum concentration assessment(s):    -Vancomycin random level was drawn pre-HD and can be used to guide therapy.  -Level is within goal 10-15 mcg/mL for SSTI.  -ESRD on iHD, HD this morning.    Vancomycin Regimen Plan:    -Give vancomycin 500 mg IV x 1 after HD today.  -Random level on 1/28 prior to HD.  -Re-dose for level < 15 and based off HD plans.    Drug levels (last 3 results):  Recent Labs   Lab Result Units 01/26/23  0340   Vancomycin, Random ug/mL 13.9       Pharmacy will continue to follow and monitor vancomycin.    Please contact pharmacy at extension [phone] for questions regarding this assessment.    Thank you for the consult,   Daniel Nuñez       Patient brief summary:  Eva Bloom is a 57 y.o. female initiated on antimicrobial therapy with IV Vancomycin for treatment of skin & soft tissue infection    Drug Allergies:   Review of patient's allergies indicates:   Allergen Reactions    Coreg [carvedilol] Other (See Comments)     Nausea/vomiting    Allopurinol      Other reaction(s): abnormal transaminases       Actual Body Weight:   44.9 kg    Renal Function:   Estimated Creatinine Clearance: 8.9 mL/min (A) (based on SCr of 4.9 mg/dL (H)).,     Dialysis Method (if applicable):  intermittent HD    CBC (last 72 hours):  Recent Labs   Lab Result Units 01/25/23  1204   WBC K/uL 6.89   Hemoglobin g/dL 9.2*   Hematocrit % 31.1*   Platelets K/uL 136*   Gran % % 85.2*   Lymph % % 8.7*   Mono % % 2.5*   Eosinophil % % 2.3   Basophil % % 0.4   Differential Method  Automated       Metabolic Panel (last 72 hours):  Recent Labs   Lab Result Units 01/25/23  1204   Sodium mmol/L 140   Potassium mmol/L 3.7   Chloride mmol/L 103   CO2 mmol/L 26   Glucose mg/dL 105   BUN mg/dL 37*   Creatinine mg/dL 4.9*  4.9*       Vancomycin Administrations:  vancomycin given in the last 96 hours                     vancomycin (VANCOCIN) injection 1 g (g) 1 g Given  01/25/23 0832                    Microbiologic Results:  Microbiology Results (last 7 days)       Procedure Component Value Units Date/Time    Culture, Anaerobe [[id number]] Collected: 01/25/23 0830    Order Status: Completed Specimen: Incision site from Arm, Left Updated: 01/26/23 0910     Anaerobic Culture Culture in progress    Narrative:      1. Left Arm AV Graft    Gram stain [[id number]] Collected: 01/25/23 0830    Order Status: Completed Specimen: Incision site from Arm, Left Updated: 01/25/23 1404     Gram Stain Result Rare WBC's      No organisms seen    Narrative:      1. Left arm AV graft    Aerobic culture [[id number]] Collected: 01/25/23 0830    Order Status: Sent Specimen: Incision site from Arm, Left Updated: 01/25/23 0860

## 2023-01-26 NOTE — ASSESSMENT & PLAN NOTE
57yM w/ PMH HTN, HFrEF, RCC s/p nephrectomy, and ESRD on HD with infected LUE AVG now s/p excision of AVG 1/25.    - ASA, statin  - Amlodipine, hydralazine, and metoprolol  - PRN pain meds  - Continue zosyn  - ID consult for abx guidance  - Nephrology following for dialysis  - Will place wound vac today  - Appreciate SW assistance for HH for IV abx and wound vac

## 2023-01-26 NOTE — NURSING
NEURO: Assist x1, ao x4 limited movement to ALISA.     CV: ICD hx, pulse rate 70s    RESP: wdl    GI: Loose but not liquid bms, frequent    : anureic    SKIN/WOUND/DRESSING: wound vac -125 ALISA    EQUIPMENT: Wound vac    LINES/GTTS: PHYLLIS ML- KVO @ 5cc/hr.  Vasculopath hx

## 2023-01-26 NOTE — ASSESSMENT & PLAN NOTE
Outpatient HD Information:    -Outpatient HD unit: [facility]   -Nephrologist: MD Tolu  -HD tx days: T/Th/Sat  -HD tx time: 180mins  -Last HD tx prior to presentation: 1/24/22  -HD access: L IJ TDC   -HD modality: iHD   -Residual urine:  -EDW: 45kg     Plan/Recommendations:    -HD today for metabolic clearance and volume management; tolerating well    -Renal diet, if not NPO   -Strict I/O's and daily weights  -Daily renal function panels   -Renally dose meds

## 2023-01-26 NOTE — HPI
Ms. Bloom is a 57 y.o. F PMHx of ESRD on HD (LUE AVF), RCC (s/p L nephrectomy in 2016), HFrEF (EF 25%) s/p AICD, Afib on Eliquis presenting to Vascular clinic with redness and swelling at her old LUE AV graft that was replaced with a newer LUE graft in mid-December 2022. With concerns for a graft infection patient was prescribed Keflex, admitted, older graft was excised on 01/25, OR cultures collected, and newer LUE graft was left intact as it did not apper infected and didn't communicate with old graft. In addition, a L IJ tunneled cath was placed. ID consulted for duration of antibiotics course in patient with AVG infection.

## 2023-01-26 NOTE — PLAN OF CARE
[facility]  Initial Discharge Assessment       Primary Care Provider: Sowmya Mccain MD    Admission Diagnosis: Infection of AV graft for dialysis [T82.7XXA]  Infected prosthetic vascular graft [T82.7XXA]    Admission Date: 1/25/2023  Expected Discharge Date: 1/30/2023    Discharge Barriers Identified: None    Payor: MEDICARE / Plan: MEDICARE PART A & B / Product Type: Government /     Extended Emergency Contact Information  Primary Emergency Contact: Otis Bloom  Address: [address] [place] [address] [place] [address] [facility]  Home Phone: [phone]  Mobile Phone: [phone]  Relation: Spouse    Discharge Plan A: Home Health         [id number] [place] - [address] [facility]  [address] [place] [address]  Phone: [phone] Fax: [phone]    [facility] [place] - [place] - [address] [place]  [address] [facility] [place] [address]  Phone: [phone] Fax: [phone]    [facility]  [address]  [facility] [address]  Phone: [phone] Fax: [phone]      Initial Assessment (most recent)       Adult Discharge Assessment - 01/26/23 1405          Discharge Assessment    Assessment Type Discharge Planning Brief Assessment     Confirmed/corrected address, phone number and insurance Yes     Confirmed Demographics Correct on Facesheet     Source of Information patient     When was your last doctors appointment? 01/24/23     Does patient/caregiver understand observation status Yes     Communicated MAVIS with patient/caregiver Yes     Reason For Admission Infection of AV graft     People in Home spouse     Facility Arrived From: home     Do you expect to return to your current living situation? Yes     Do you have help at home or someone to help you manage your care at home? Yes     Who are your caregiver(s) and their phone number(s)? Otis-[phone]     Prior to hospitilization cognitive status:  Alert/Oriented     Current cognitive status: Alert/Oriented     Home Layout Able to live on 1st floor     Equipment Currently Used at Home none     Readmission within 30 days? No     Patient currently being followed by outpatient case management? No     Do you currently have service(s) that help you manage your care at home? No     Do you take prescription medications? Yes     Do you have prescription coverage? Yes     Coverage Medicare     Do you have any problems affording any of your prescribed medications? No     Is the patient taking medications as prescribed? yes     Who is going to help you get home at discharge? Chavez-[phone]     How do you get to doctors appointments? car, drives self     Are you on dialysis? Yes     Dialysis Name and Scheduled days [address] [place] [address]- Tuesday,Thursday, Saturday     Do you take coumadin? No     Discharge Plan A Home Health     DME Needed Upon Discharge  none     Discharge Plan discussed with: Patient     Discharge Barriers Identified None                   Spoke to pt. Pt lives at home with Otis--[phone]. Post hospital  stay  will be pt support person and pt. has transportation at d/c with . There have been no hospitalizations within the last 30 days per pt. Verified pt PCP and preferred pharmacy. Pt stated not on Coumadin and is receiving dialysis at [facility] [address] [facility] [place] [address] T,Th,Sa. All questions answered regarding case management/ discharge planning , pt verbalized understanding. Discharge booklet with SW contact information given to pt.       Ruth Cunningham LCSW  Case Management/[facility]  [phone]

## 2023-01-26 NOTE — NURSING
Dialysis tx started to the left IJ dialysis cath per orders placed by TED Brock:    Order Questions    Question Answer   Antibiotics on HD? No   Duration of Treatment 3.5 hours   Dialyzer F160   Dialysate Temperature (C) 36.5   BFR-As tolerated to a maximum of: 400 mL/min    mL/min   K+ Potassium per Protocol   Ca++ Calcium per Protocol   Na+ Sodium per Protocol   Bicarb Bicarbonate per Protocol   Access AVF   Fluid Removal (L): 2L as tolerated, keep MAP >65   Dialysate Bath Solution Protocol (DO NOT MODIFY ANSWER) [email or web address] Dialysate Bath Solution Algorithm (formatted with date).pdf

## 2023-01-26 NOTE — PLAN OF CARE
Problem: Hemodynamic Instability (Hemodialysis)  Goal: Effective Tissue Perfusion  Outcome: Ongoing, Progressing    Dialysis tx ended to the left IJ dialysis cath, heparin locked and capped.      Net fluid removed: 249 ml due to c/o nausea during tx     Report given to nurse Ortiz pt transported by wheelchair from [facility] to room 1024.

## 2023-01-26 NOTE — CONSULTS
[facility]  Infectious Disease  Consult Note    Patient Name: Eva Bloom  MRN: [id number]  Admission Date: 1/25/2023  Hospital Length of Stay: 1 days  Attending Physician: FARIDEH Muñoz III, MD  Primary Care Provider: Sowmya Mccain MD     Isolation Status: No active isolations    Patient information was obtained from patient, past medical records and primary team.      Inpatient consult to Infectious Diseases  Consult performed by: Cordell Bach MD  Consult ordered by: Corby Hernandez MD      Assessment/Plan:     * Arteriovenous graft infection  Patient with AVG infection from old graft with source control achieved via graft removal. Had localized symptoms and no vitals/labs on admission indicative of sepsis, however blood cultures will need to be drawn as this is a factor in therapy duration. Anticipate a 4-6 week course of antibiotics to cover for infection. At this time:    - Draw blood cultures to help determine treatment course and hematogenous spread  - Continue broad spectrum coverage with Vancomycin  - DC Zosyn  - Test for C Diff (hx of C Diff and now has multiple days of watery stools prior to current hospitalization)  - Consider cough alleviation with Tesslon-Perles        Thank you for your consult. I will follow-up with patient. Please contact us if you have any additional questions.    Cordell Bach MD  Infectious Disease  [facility]    Subjective:     Principal Problem: Arteriovenous graft infection    HPI: Ms. Bloom is a 57 y.o. F PMHx of ESRD on HD (LUE AVF), RCC (s/p L nephrectomy in 2016), HFrEF (EF 25%) s/p AICD, Afib on Eliquis presenting to Vascular clinic with redness and swelling at her old LUE AV graft that was replaced with a newer LUE graft in mid-December 2022. With concerns for a graft infection patient was prescribed Keflex, admitted, older graft was excised on 01/25, OR cultures collected, and newer LUE graft was left intact as it did not apper infected and didn't  communicate with old graft. In addition, a L IJ tunneled cath was placed. ID consulted for duration of antibiotics course in patient with AVG infection.       Past Medical History:   Diagnosis Date    Anemia     Bronchitis 03/01/2017    Cancer 2016    kidney cancer    CHF (congestive heart failure), NYHA class II, chronic, systolic     CMV (cytomegalovirus) antibody positive     Essential hypertension 9/23/2015    H/O herpes simplex type 2 infection     Herpes simplex type 1 antibody positive     History of kidney cancer     s/p left nephrectomy 1/2016    Hyperparathyroidism, unspecified     Hyperuricemia without signs of inflammatory arthritis and tophaceous disease     Kidney stones     LGSIL (low grade squamous intraepithelial dysplasia)     Myocardiopathy 7/21/2017    Prediabetes     Proteinuria     Renal disorder     Thyroid nodule     Urate nephropathy        Past Surgical History:   Procedure Laterality Date    BREAST CYST EXCISION      COLONOSCOPY N/A 11/12/2015    COLONOSCOPY N/A 3/12/2021    Procedure: COLONOSCOPY;  Surgeon: Brendon Lanier MD;  Location: [facility];  Service: Endoscopy;  Laterality: N/A;  covid test 3/9, labs prior, prep instr mailed -ml    INSERTION OF BIVENTRICULAR IMPLANTABLE CARDIOVERTER-DEFIBRILLATOR (ICD)  04/2021    INSERTION OF TUNNELED CENTRAL VENOUS CATHETER (CVC) WITH SUBCUTANEOUS PORT N/A 1/25/2023    Procedure: INSERTION, DUAL LUMEN CATHETER WITH PORT, WITH IMAGING GUIDANCE;  Surgeon: FARIDEH Muñoz III, MD;  Location: [facility] OR [address] [place] [address];  Service: Peripheral Vascular;  Laterality: N/A;  possinble permcath placment    NEPHRECTOMY-LAPAROSCOPIC Left 01/12/2016    PERITONEAL CATHETER INSERTION      Permacath insertion  01/12/2017    PLACEMENT OF ARTERIOVENOUS GRAFT  12/28/2022    Procedure: INSERTION, GRAFT, ARTERIOVENOUS;  Surgeon: FARIDEH Muñoz III, MD;  Location: [facility] OR [address] [place] [address];  Service: Peripheral Vascular;;    REMOVAL, GRAFT, ARTERIOVENOUS, UPPER EXTREMITY Left  2023    Procedure: REMOVAL, GRAFT, ARTERIOVENOUS, UPPER EXTREMITY;  Surgeon: FARIDEH Muñoz III, MD;  Location: [facility] OR 2ND FLR;  Service: Peripheral Vascular;  Laterality: Left;    REVISION OF PROCEDURE INVOLVING ARTERIOVENOUS GRAFT Left 2022    Procedure: REVISION, PROCEDURE INVOLVING ARTERIOVENOUS GRAFT;  Surgeon: FARIDEH Muñoz III, MD;  Location: [facility] OR 2ND FLR;  Service: Peripheral Vascular;  Laterality: Left;    SALPINGOOPHORECTOMY Right     KJB---DAVINCI    TONSILLECTOMY      TUBAL LIGATION         Review of patient's allergies indicates:   Allergen Reactions    Coreg [carvedilol] Other (See Comments)     Nausea/vomiting    Allopurinol      Other reaction(s): abnormal transaminases       Medications:  Medications Prior to Admission   Medication Sig    apixaban (ELIQUIS) 2.5 mg Tab Take 1 tablet (2.5 mg total) by mouth 2 (two) times daily.    CABOMETYX 60 mg Tab TAKE ONE TABLET BY MOUTH ONCE DAILY AT THE SAME TIME ON AN EMPTY STOMACH AT LEAST 1 HOUR BEFORE OR 2 HOURS AFTER EATING. AVOID GRAPEFRUIT PRODUCTS    [] cephALEXin (KEFLEX) 500 MG capsule Take 1 capsule (500 mg total) by mouth every 6 (six) hours. for 1 day    cloNIDine 0.3 mg/24 hr td ptwk (CATAPRES) 0.3 mg/24 hr Place 1 patch onto the skin every 7 days.    hydrALAZINE (APRESOLINE) 100 MG tablet Take 1 tablet (100 mg total) by mouth every 12 (twelve) hours.    metoprolol succinate (TOPROL-XL) 100 MG 24 hr tablet Take 1/2 tablet (50mg) once daily    mv,Ca,min-folic acid-vit K1 (ONE-A-DAY WOMEN'S 50 PLUS) 400-20 mcg Tab Take 1 tablet by mouth once daily.    sacubitriL-valsartan (ENTRESTO) 49-51 mg per tablet Take 1 tablet by mouth 2 (two) times daily.    acetaminophen (TYLENOL) 500 MG tablet Take 500 mg by mouth every 6 (six) hours as needed for Pain.    epoetin david-epbx (RETACRIT INJ) Epoetin david - epbx (Retacrit)    gabapentin (NEURONTIN) 100 MG capsule Take 1 capsule (100 mg total) by mouth once daily.     HYDROcodone-acetaminophen (NORCO) 5-325 mg per tablet Take 1 tablet by mouth every 4 (four) hours as needed for Pain. Causes drowsiness. Do not drive or operate machinery with this medication. Do not drink alcohol with this medication. Causes constipation. Take with stool softener.    lidocaine-prilocaine (EMLA) cream APPLY ATLEAST 30 MINUTES BEFORE TREATMENT 3 TIMES A WEEK    promethazine (PHENERGAN) 25 MG tablet Take 1 tablet (25 mg total) by mouth every 6 (six) hours as needed for Nausea.    sodium chloride 0.9% SolP 100 mL with iron sucrose 100 mg iron/5 mL Soln 100 mg 50 mg every Tues, Thurs, Sat.     Antibiotics (From admission, onward)      Start     Stop Route Frequency Ordered    01/26/23 1300  vancomycin (VANCOCIN) 500 mg in dextrose 5 % in water (D5W) 5 % 100 mL IVPB (MB+)         -- IV Once 01/26/23 0914    01/25/23 1400  piperacillin-tazobactam (ZOSYN) 4.5 g in dextrose 5 % in water (D5W) 5 % 100 mL IVPB (MB+)         -- IV Every 12 hours (non-standard times) 01/25/23 1354    01/25/23 1143  vancomycin - pharmacy to dose  (vancomycin IVPB)        See Hyperspace for full Linked Orders Report.    -- IV pharmacy to manage frequency 01/25/23 1048          Antifungals (From admission, onward)      None          Antivirals (From admission, onward)      None             Immunization History   Administered Date(s) Administered    COVID-19, MRNA, LN-S, PF (MODERNA FULL 0.5 ML DOSE) 01/15/2021, 02/11/2021, 11/11/2021    Hepatitis A, Adult 09/23/2015    Hepatitis B, Adult 09/23/2015, 03/12/2016, 07/22/2016, 09/23/2016, 02/14/2019    Influenza 09/06/2011, 10/09/2012, 09/14/2013, 10/09/2015, 09/19/2020    Influenza - Quadrivalent 09/13/2018, 09/24/2019, 09/19/2020    Influenza - Quadrivalent - PF *Preferred* (6 months and older) 09/14/2013, 10/04/2014, 10/09/2015, 10/01/2017, 09/19/2020, 09/30/2021, 10/15/2022    Influenza Split 09/06/2011, 10/09/2012, 10/09/2015    Influenza Whole 10/09/2015    PPD Test 01/14/2016     Pneumococcal Conjugate - 13 Valent 09/23/2015    Pneumococcal Polysaccharide - 23 Valent 03/28/2016, 11/09/2016    Tdap 09/23/2015    Zoster Recombinant 05/17/2021, 07/19/2021       Family History       Problem Relation (Age of Onset)    Diabetes Father, Maternal Grandfather    Heart disease Sister    Hypertension Mother    Kidney disease Father    No Known Problems Son, Son, Sister, Brother, Maternal Grandmother, Paternal Grandmother, Paternal Grandfather, Sister, Brother, Maternal Aunt, Maternal Uncle, Paternal Aunt, Paternal Uncle          Social History     Socioeconomic History    Marital status:     Number of children: 2   Occupational History    Occupation: [employer]     Employer: [id number]   Tobacco Use    Smoking status: Never    Smokeless tobacco: Never   Substance and Sexual Activity    Alcohol use: No     Comment: . 2 children. works at [employer].    Drug use: No    Sexual activity: Yes     Partners: Male     Social Determinants of Health     Financial Resource Strain: Low Risk     Difficulty of Paying Living Expenses: Not hard at all   Food Insecurity: No Food Insecurity    Worried About Running Out of Food in the Last Year: Never true    Ran Out of Food in the Last Year: Never true   Transportation Needs: No Transportation Needs    Lack of Transportation (Medical): No    Lack of Transportation (Non-Medical): No   Physical Activity: Insufficiently Active    Days of Exercise per Week: 3 days    Minutes of Exercise per Session: 30 min   Stress: No Stress Concern Present    Feeling of Stress : Not at all   Social Connections: Socially Integrated    Frequency of Communication with Friends and Family: More than three times a week    Frequency of Social Gatherings with Friends and Family: Once a week    Attends Restorationism Services: 1 to 4 times per year    Active Member of Clubs or Organizations: Yes    Attends Club or Organization Meetings: 1 to 4 times per year    Marital Status:     Housing Stability: Low Risk     Unable to Pay for Housing in the Last Year: No    Number of Places Lived in the Last Year: 1    Unstable Housing in the Last Year: No     Review of Systems   Constitutional:  Negative for activity change, appetite change and fever.   Respiratory:  Positive for cough. Negative for shortness of breath and wheezing.    Cardiovascular:  Negative for chest pain and leg swelling.   Gastrointestinal:  Negative for abdominal pain, constipation, diarrhea, nausea and vomiting.   Musculoskeletal:  Positive for myalgias (At LUE fistula site).   Skin:  Negative for rash.   Neurological:  Negative for headaches.   Objective:     Vital Signs (Most Recent):  Temp: 96.9 °F (36.1 °C) (01/26/23 0746)  Pulse: 60 (01/26/23 1116)  Resp: 15 (01/26/23 1116)  BP: 119/69 (01/26/23 1116)  SpO2: 95 % (01/26/23 0558) Vital Signs (24h Range):  Temp:  [96.7 °F (35.9 °C)-98.3 °F (36.8 °C)] 96.9 °F (36.1 °C)  Pulse:  [56-81] 60  Resp:  [11-18] 15  SpO2:  [90 %-100 %] 95 %  BP: ()/(53-78) 119/69     Weight: 44.9 kg (99 lb)  Body mass index is 16.99 kg/m².    Estimated Creatinine Clearance: 7.4 mL/min (A) (based on SCr of 5.9 mg/dL (H)).    Physical Exam  Vitals and nursing note reviewed.   Constitutional:       Appearance: Normal appearance.   HENT:      Head: Normocephalic and atraumatic.   Eyes:      Extraocular Movements: Extraocular movements intact.      Conjunctiva/sclera: Conjunctivae normal.   Cardiovascular:      Rate and Rhythm: Normal rate and regular rhythm.      Pulses: Normal pulses.      Comments: L tunneled cath in place, C/D/I, attached to iHD and functioning well  Pulmonary:      Effort: Pulmonary effort is normal. No respiratory distress.      Breath sounds: Normal breath sounds. No wheezing or rales.   Abdominal:      Palpations: Abdomen is soft.      Tenderness: There is no abdominal tenderness. There is no guarding.   Musculoskeletal:         General: Tenderness (LUE fistula site,  unable to visualize wrapped in clean wrap) present.      Cervical back: Normal range of motion.      Right lower leg: No edema.      Left lower leg: No edema.   Neurological:      General: No focal deficit present.      Mental Status: She is alert and oriented to person, place, and time.   Psychiatric:         Mood and Affect: Mood normal.     Significant Labs: All pertinent labs within the past 24 hours have been reviewed.    Significant Imaging: I have reviewed all pertinent imaging results/findings within the past 24 hours.

## 2023-01-26 NOTE — ASSESSMENT & PLAN NOTE
Patient with AVG infection from old graft with source control achieved via graft removal. Had localized symptoms and no vitals/labs on admission indicative of sepsis, however blood cultures will need to be drawn as this is a factor in therapy duration. Anticipate a 4-6 week course of antibiotics to cover for infection. At this time:    - Draw blood cultures to help determine treatment course and hematogenous spread  - Continue broad spectrum coverage with Vancomycin  - DC Zosyn  - Test for C Diff (hx of C Diff and now has multiple days of watery stools prior to current hospitalization)  - Consider cough alleviation with Tesslon-Perles

## 2023-01-26 NOTE — PLAN OF CARE
Wound vac form and clinicals sent to [facility].  to monitor.    Ruth Cunningham LCSW  Case Management/[facility]  [phone]

## 2023-01-26 NOTE — SUBJECTIVE & OBJECTIVE
Medications:  Continuous Infusions:   sodium chloride 0.9% 70 mL/hr at 01/25/23 0725     Scheduled Meds:   sodium chloride 0.9%   Intravenous Once    amLODIPine  5 mg Oral Daily    aspirin  81 mg Oral Daily    atorvastatin  40 mg Oral Daily    hydrALAZINE  100 mg Oral Q12H    metoprolol tartrate  50 mg Oral BID    piperacillin-tazobactam (ZOSYN) IVPB  4.5 g Intravenous Q12H     PRN Meds:HYDROcodone-acetaminophen, oxyCODONE, Pharmacy to dose Vancomycin consult **AND** vancomycin - pharmacy to dose     Objective:     Vital Signs (Most Recent):  Temp: 97.2 °F (36.2 °C) (01/26/23 0427)  Pulse: 80 (01/26/23 0427)  Resp: 18 (01/25/23 2000)  BP: 130/68 (01/26/23 0427)  SpO2: 95 % (01/26/23 0558) Vital Signs (24h Range):  Temp:  [96.7 °F (35.9 °C)-98.3 °F (36.8 °C)] 97.2 °F (36.2 °C)  Pulse:  [62-81] 80  Resp:  [11-18] 18  SpO2:  [90 %-100 %] 95 %  BP: (105-141)/(60-77) 130/68         Physical Exam  Constitutional:       General: She is not in acute distress.     Appearance: Normal appearance.   HENT:      Head: Normocephalic and atraumatic.      Mouth/Throat:      Mouth: Mucous membranes are moist.   Eyes:      Extraocular Movements: Extraocular movements intact.      Conjunctiva/sclera: Conjunctivae normal.   Cardiovascular:      Rate and Rhythm: Normal rate and regular rhythm.   Pulmonary:      Effort: Pulmonary effort is normal. No respiratory distress.   Abdominal:      General: There is no distension.      Palpations: Abdomen is soft.   Musculoskeletal:      Comments: LUE graft excision site with WTD dressing in place, no purulent drainage   Skin:     General: Skin is warm and dry.   Neurological:      General: No focal deficit present.      Mental Status: She is alert and oriented to person, place, and time. Mental status is at baseline.       Significant Labs:  All pertinent labs from the last 24 hours have been reviewed.    Significant Diagnostics:  I have reviewed all pertinent imaging results/findings within  the past 24 hours.

## 2023-01-26 NOTE — PROGRESS NOTES
[facility]  Vascular Surgery  Progress Note    Patient Name: Eva Bloom  MRN: [id number]  Admission Date: 1/25/2023  Primary Care Provider: Sowmya Mccain MD    Subjective:     Interval History: Patient admitted after excision of old LUE AVG yesterday. Resting comfortably in bed this morning. Pain well controlled.    Post-Op Info:  Procedure(s) (LRB):  REMOVAL, GRAFT, ARTERIOVENOUS, UPPER EXTREMITY (Left)  INSERTION, DUAL LUMEN CATHETER WITH PORT, WITH IMAGING GUIDANCE (N/A)   1 Day Post-Op       Medications:  Continuous Infusions:   sodium chloride 0.9% 70 mL/hr at 01/25/23 0725     Scheduled Meds:   sodium chloride 0.9%   Intravenous Once    amLODIPine  5 mg Oral Daily    aspirin  81 mg Oral Daily    atorvastatin  40 mg Oral Daily    hydrALAZINE  100 mg Oral Q12H    metoprolol tartrate  50 mg Oral BID    piperacillin-tazobactam (ZOSYN) IVPB  4.5 g Intravenous Q12H     PRN Meds:HYDROcodone-acetaminophen, oxyCODONE, Pharmacy to dose Vancomycin consult **AND** vancomycin - pharmacy to dose     Objective:     Vital Signs (Most Recent):  Temp: 97.2 °F (36.2 °C) (01/26/23 0427)  Pulse: 80 (01/26/23 0427)  Resp: 18 (01/25/23 2000)  BP: 130/68 (01/26/23 0427)  SpO2: 95 % (01/26/23 0558) Vital Signs (24h Range):  Temp:  [96.7 °F (35.9 °C)-98.3 °F (36.8 °C)] 97.2 °F (36.2 °C)  Pulse:  [62-81] 80  Resp:  [11-18] 18  SpO2:  [90 %-100 %] 95 %  BP: (105-141)/(60-77) 130/68         Physical Exam  Constitutional:       General: She is not in acute distress.     Appearance: Normal appearance.   HENT:      Head: Normocephalic and atraumatic.      Mouth/Throat:      Mouth: Mucous membranes are moist.   Eyes:      Extraocular Movements: Extraocular movements intact.      Conjunctiva/sclera: Conjunctivae normal.   Cardiovascular:      Rate and Rhythm: Normal rate and regular rhythm.   Pulmonary:      Effort: Pulmonary effort is normal. No respiratory distress.   Abdominal:      General: There is no distension.       Palpations: Abdomen is soft.   Musculoskeletal:      Comments: LUE graft excision site with WTD dressing in place, no purulent drainage   Skin:     General: Skin is warm and dry.   Neurological:      General: No focal deficit present.      Mental Status: She is alert and oriented to person, place, and time. Mental status is at baseline.       Significant Labs:  All pertinent labs from the last 24 hours have been reviewed.    Significant Diagnostics:  I have reviewed all pertinent imaging results/findings within the past 24 hours.    Assessment/Plan:     * Arteriovenous graft infection  57yM w/ PMH HTN, HFrEF, RCC s/p nephrectomy, and ESRD on HD with infected LUE AVG now s/p excision of AVG 1/25.    - ASA, statin  - Amlodipine, hydralazine, and metoprolol  - PRN pain meds  - Continue zosyn  - ID consult for abx guidance  - Nephrology following for dialysis  - Will place wound vac today  - Appreciate SW assistance for  for IV abx and wound vac        Telma Patino MD  Vascular Surgery  [facility]

## 2023-01-26 NOTE — SUBJECTIVE & OBJECTIVE
Interval History: Seen and evaluated by bedside. No acute events. Feeling well this AM. Tolerating hemodialysis well.     Review of patient's allergies indicates:   Allergen Reactions    Coreg [carvedilol] Other (See Comments)     Nausea/vomiting    Allopurinol      Other reaction(s): abnormal transaminases     Current Facility-Administered Medications   Medication Frequency    0.9%  NaCl infusion Continuous    0.9%  NaCl infusion Once    amLODIPine tablet 5 mg Daily    aspirin EC tablet 81 mg Daily    atorvastatin tablet 40 mg Daily    hydrALAZINE tablet 100 mg Q12H    HYDROcodone-acetaminophen 5-325 mg per tablet 1 tablet Q4H PRN    metoprolol tartrate (LOPRESSOR) tablet 50 mg BID    oxyCODONE immediate release tablet Tab 10 mg Q6H PRN    piperacillin-tazobactam (ZOSYN) 4.5 g in dextrose 5 % in water (D5W) 5 % 100 mL IVPB (MB+) Q12H    vancomycin - pharmacy to dose pharmacy to manage frequency       Objective:     Vital Signs (Most Recent):  Temp: 97.2 °F (36.2 °C) (01/26/23 0427)  Pulse: 73 (01/26/23 0815)  Resp: 18 (01/25/23 2000)  BP: 120/70 (01/26/23 0815)  SpO2: 95 % (01/26/23 0558) Vital Signs (24h Range):  Temp:  [96.7 °F (35.9 °C)-98.3 °F (36.8 °C)] 97.2 °F (36.2 °C)  Pulse:  [62-81] 73  Resp:  [11-18] 18  SpO2:  [90 %-100 %] 95 %  BP: (105-141)/(60-78) 120/70     Weight: 44.9 kg (99 lb) (01/25/23 0646)  Body mass index is 16.99 kg/m².  Body surface area is 1.42 meters squared.    I/O last 3 completed shifts:  In: 909.7 [P.O.:600; IV Piggyback:309.7]  Out: -     Physical Exam  Constitutional:       General: She is not in acute distress.     Appearance: Normal appearance. She is not ill-appearing.   HENT:      Head: Normocephalic and atraumatic.      Nose: Nose normal.      Mouth/Throat:      Mouth: Mucous membranes are moist.      Pharynx: Oropharynx is clear.   Eyes:      Conjunctiva/sclera: Conjunctivae normal.   Cardiovascular:      Rate and Rhythm: Normal rate.      Comments: L IJ TDC   Pulmonary:       Effort: Pulmonary effort is normal.   Abdominal:      General: Abdomen is flat.   Musculoskeletal:      Cervical back: Neck supple.      Right lower leg: No edema.      Left lower leg: No edema.      Comments: Surgical dressing to ALISA   Skin:     General: Skin is warm and dry.   Neurological:      Mental Status: She is oriented to person, place, and time.   Psychiatric:         Mood and Affect: Mood normal.         Behavior: Behavior normal.       Significant Labs:  CBC:   Recent Labs   Lab 01/25/23  1204   WBC 6.89   RBC 3.09*   HGB 9.2*   HCT 31.1*   *   *   MCH 29.8   MCHC 29.6*     CMP:   Recent Labs   Lab 01/25/23  1204      CALCIUM 8.7      K 3.7   CO2 26      BUN 37*   CREATININE 4.9*  4.9*

## 2023-01-26 NOTE — CONSULTS
LANDY placed 20 g x 8 cm midline in Right brachial vein for pva using realtime ultrasound guidance.      Lot#[id number].  Max dwell date 2/24/23.  Imagery recorded and saved.

## 2023-01-26 NOTE — SUBJECTIVE & OBJECTIVE
Past Medical History:   Diagnosis Date    Anemia     Bronchitis 03/01/2017    Cancer 2016    kidney cancer    CHF (congestive heart failure), NYHA class II, chronic, systolic     CMV (cytomegalovirus) antibody positive     Essential hypertension 9/23/2015    H/O herpes simplex type 2 infection     Herpes simplex type 1 antibody positive     History of kidney cancer     s/p left nephrectomy 1/2016    Hyperparathyroidism, unspecified     Hyperuricemia without signs of inflammatory arthritis and tophaceous disease     Kidney stones     LGSIL (low grade squamous intraepithelial dysplasia)     Myocardiopathy 7/21/2017    Prediabetes     Proteinuria     Renal disorder     Thyroid nodule     Urate nephropathy        Past Surgical History:   Procedure Laterality Date    BREAST CYST EXCISION      COLONOSCOPY N/A 11/12/2015    COLONOSCOPY N/A 3/12/2021    Procedure: COLONOSCOPY;  Surgeon: Brendon Lanier MD;  Location: [facility];  Service: Endoscopy;  Laterality: N/A;  covid test 3/9, labs prior, prep instr mailed -ml    INSERTION OF BIVENTRICULAR IMPLANTABLE CARDIOVERTER-DEFIBRILLATOR (ICD)  04/2021    INSERTION OF TUNNELED CENTRAL VENOUS CATHETER (CVC) WITH SUBCUTANEOUS PORT N/A 1/25/2023    Procedure: INSERTION, DUAL LUMEN CATHETER WITH PORT, WITH IMAGING GUIDANCE;  Surgeon: FARIDEH Muñoz III, MD;  Location: [facility] OR [address] [place] [address];  Service: Peripheral Vascular;  Laterality: N/A;  possinble permcath placment    NEPHRECTOMY-LAPAROSCOPIC Left 01/12/2016    PERITONEAL CATHETER INSERTION      Permacath insertion  01/12/2017    PLACEMENT OF ARTERIOVENOUS GRAFT  12/28/2022    Procedure: INSERTION, GRAFT, ARTERIOVENOUS;  Surgeon: FARIDEH Muñoz III, MD;  [facility];  Service: Peripheral Vascular;;    REMOVAL, GRAFT, ARTERIOVENOUS, UPPER EXTREMITY Left 1/25/2023    Procedure: REMOVAL, GRAFT, ARTERIOVENOUS, UPPER EXTREMITY;  Surgeon: FARIDEH Muñoz III, MD;  [facility];  Service: Peripheral Vascular;   Laterality: Left;    REVISION OF PROCEDURE INVOLVING ARTERIOVENOUS GRAFT Left 2022    Procedure: REVISION, PROCEDURE INVOLVING ARTERIOVENOUS GRAFT;  Surgeon: FARIDEH Muñoz III, MD;  Location: [facility] OR [address] [place] [address];  Service: Peripheral Vascular;  Laterality: Left;    SALPINGOOPHORECTOMY Right     KJB---DAVINCI    TONSILLECTOMY      TUBAL LIGATION         Review of patient's allergies indicates:   Allergen Reactions    Coreg [carvedilol] Other (See Comments)     Nausea/vomiting    Allopurinol      Other reaction(s): abnormal transaminases       Medications:  Medications Prior to Admission   Medication Sig    apixaban (ELIQUIS) 2.5 mg Tab Take 1 tablet (2.5 mg total) by mouth 2 (two) times daily.    CABOMETYX 60 mg Tab TAKE ONE TABLET BY MOUTH ONCE DAILY AT THE SAME TIME ON AN EMPTY STOMACH AT LEAST 1 HOUR BEFORE OR 2 HOURS AFTER EATING. AVOID GRAPEFRUIT PRODUCTS    [] cephALEXin (KEFLEX) 500 MG capsule Take 1 capsule (500 mg total) by mouth every 6 (six) hours. for 1 day    cloNIDine 0.3 mg/24 hr td ptwk (CATAPRES) 0.3 mg/24 hr Place 1 patch onto the skin every 7 days.    hydrALAZINE (APRESOLINE) 100 MG tablet Take 1 tablet (100 mg total) by mouth every 12 (twelve) hours.    metoprolol succinate (TOPROL-XL) 100 MG 24 hr tablet Take 1/2 tablet (50mg) once daily    mv,Ca,min-folic acid-vit K1 (ONE-A-DAY WOMEN'S 50 PLUS) 400-20 mcg Tab Take 1 tablet by mouth once daily.    sacubitriL-valsartan (ENTRESTO) 49-51 mg per tablet Take 1 tablet by mouth 2 (two) times daily.    acetaminophen (TYLENOL) 500 MG tablet Take 500 mg by mouth every 6 (six) hours as needed for Pain.    epoetin david-epbx (RETACRIT INJ) Epoetin david - epbx (Retacrit)    gabapentin (NEURONTIN) 100 MG capsule Take 1 capsule (100 mg total) by mouth once daily.    HYDROcodone-acetaminophen (NORCO) 5-325 mg per tablet Take 1 tablet by mouth every 4 (four) hours as needed for Pain. Causes drowsiness. Do not drive or operate machinery with this  medication. Do not drink alcohol with this medication. Causes constipation. Take with stool softener.    lidocaine-prilocaine (EMLA) cream APPLY ATLEAST 30 MINUTES BEFORE TREATMENT 3 TIMES A WEEK    promethazine (PHENERGAN) 25 MG tablet Take 1 tablet (25 mg total) by mouth every 6 (six) hours as needed for Nausea.    sodium chloride 0.9% SolP 100 mL with iron sucrose 100 mg iron/5 mL Soln 100 mg 50 mg every Tues, Thurs, Sat.     Antibiotics (From admission, onward)      Start     Stop Route Frequency Ordered    01/26/23 1300  vancomycin (VANCOCIN) 500 mg in dextrose 5 % in water (D5W) 5 % 100 mL IVPB (MB+)         -- IV Once 01/26/23 0914    01/25/23 1400  piperacillin-tazobactam (ZOSYN) 4.5 g in dextrose 5 % in water (D5W) 5 % 100 mL IVPB (MB+)         -- IV Every 12 hours (non-standard times) 01/25/23 1354    01/25/23 1143  vancomycin - pharmacy to dose  (vancomycin IVPB)        See Hyperspace for full Linked Orders Report.    -- IV pharmacy to manage frequency 01/25/23 1048          Antifungals (From admission, onward)      None          Antivirals (From admission, onward)      None             Immunization History   Administered Date(s) Administered    COVID-19, MRNA, LN-S, PF (MODERNA FULL 0.5 ML DOSE) 01/15/2021, 02/11/2021, 11/11/2021    Hepatitis A, Adult 09/23/2015    Hepatitis B, Adult 09/23/2015, 03/12/2016, 07/22/2016, 09/23/2016, 02/14/2019    Influenza 09/06/2011, 10/09/2012, 09/14/2013, 10/09/2015, 09/19/2020    Influenza - Quadrivalent 09/13/2018, 09/24/2019, 09/19/2020    Influenza - Quadrivalent - PF *Preferred* (6 months and older) 09/14/2013, 10/04/2014, 10/09/2015, 10/01/2017, 09/19/2020, 09/30/2021, 10/15/2022    Influenza Split 09/06/2011, 10/09/2012, 10/09/2015    Influenza Whole 10/09/2015    PPD Test 01/14/2016    Pneumococcal Conjugate - 13 Valent 09/23/2015    Pneumococcal Polysaccharide - 23 Valent 03/28/2016, 11/09/2016    Tdap 09/23/2015    Zoster Recombinant 05/17/2021, 07/19/2021        Family History       Problem Relation (Age of Onset)    Diabetes Father, Maternal Grandfather    Heart disease Sister    Hypertension Mother    Kidney disease Father    No Known Problems Son, Son, Sister, Brother, Maternal Grandmother, Paternal Grandmother, Paternal Grandfather, Sister, Brother, Maternal Aunt, Maternal Uncle, Paternal Aunt, Paternal Uncle          Social History     Socioeconomic History    Marital status:     Number of children: 2   Occupational History    Occupation: [employer]     Employer: [id number]   Tobacco Use    Smoking status: Never    Smokeless tobacco: Never   Substance and Sexual Activity    Alcohol use: No     Comment: . 2 children. works at [employer].    Drug use: No    Sexual activity: Yes     Partners: Male     Social Determinants of Health     Financial Resource Strain: Low Risk     Difficulty of Paying Living Expenses: Not hard at all   Food Insecurity: No Food Insecurity    Worried About Running Out of Food in the Last Year: Never true    Ran Out of Food in the Last Year: Never true   Transportation Needs: No Transportation Needs    Lack of Transportation (Medical): No    Lack of Transportation (Non-Medical): No   Physical Activity: Insufficiently Active    Days of Exercise per Week: 3 days    Minutes of Exercise per Session: 30 min   Stress: No Stress Concern Present    Feeling of Stress : Not at all   Social Connections: Socially Integrated    Frequency of Communication with Friends and Family: More than three times a week    Frequency of Social Gatherings with Friends and Family: Once a week    Attends Orthodox Services: 1 to 4 times per year    Active Member of Clubs or Organizations: Yes    Attends Club or Organization Meetings: 1 to 4 times per year    Marital Status:    Housing Stability: Low Risk     Unable to Pay for Housing in the Last Year: No    Number of Places Lived in the Last Year: 1    Unstable Housing in the Last Year: No      Review of Systems   Constitutional:  Negative for activity change, appetite change and fever.   Respiratory:  Positive for cough. Negative for shortness of breath and wheezing.    Cardiovascular:  Negative for chest pain and leg swelling.   Gastrointestinal:  Negative for abdominal pain, constipation, diarrhea, nausea and vomiting.   Musculoskeletal:  Positive for myalgias (At LUE fistula site).   Skin:  Negative for rash.   Neurological:  Negative for headaches.   Objective:     Vital Signs (Most Recent):  Temp: 96.9 °F (36.1 °C) (01/26/23 0746)  Pulse: 60 (01/26/23 1116)  Resp: 15 (01/26/23 1116)  BP: 119/69 (01/26/23 1116)  SpO2: 95 % (01/26/23 0558) Vital Signs (24h Range):  Temp:  [96.7 °F (35.9 °C)-98.3 °F (36.8 °C)] 96.9 °F (36.1 °C)  Pulse:  [56-81] 60  Resp:  [11-18] 15  SpO2:  [90 %-100 %] 95 %  BP: ()/(53-78) 119/69     Weight: 44.9 kg (99 lb)  Body mass index is 16.99 kg/m².    Estimated Creatinine Clearance: 7.4 mL/min (A) (based on SCr of 5.9 mg/dL (H)).    Physical Exam  Vitals and nursing note reviewed.   Constitutional:       Appearance: Normal appearance.   HENT:      Head: Normocephalic and atraumatic.   Eyes:      Extraocular Movements: Extraocular movements intact.      Conjunctiva/sclera: Conjunctivae normal.   Cardiovascular:      Rate and Rhythm: Normal rate and regular rhythm.      Pulses: Normal pulses.      Comments: L tunneled cath in place, C/D/I, attached to iHD and functioning well  Pulmonary:      Effort: Pulmonary effort is normal. No respiratory distress.      Breath sounds: Normal breath sounds. No wheezing or rales.   Abdominal:      Palpations: Abdomen is soft.      Tenderness: There is no abdominal tenderness. There is no guarding.   Musculoskeletal:         General: Tenderness (LUE fistula site, unable to visualize wrapped in clean wrap) present.      Cervical back: Normal range of motion.      Right lower leg: No edema.      Left lower leg: No edema.   Neurological:       General: No focal deficit present.      Mental Status: She is alert and oriented to person, place, and time.   Psychiatric:         Mood and Affect: Mood normal.     Significant Labs: All pertinent labs within the past 24 hours have been reviewed.    Significant Imaging: I have reviewed all pertinent imaging results/findings within the past 24 hours.

## 2023-01-26 NOTE — PROGRESS NOTES
[facility]  Nephrology  Progress Note    Patient Name: Eva Bloom  MRN: [id number]  Admission Date: 1/25/2023  Hospital Length of Stay: 1 days  Attending Provider: FARIDEH Muñoz III, MD   Primary Care Physician: Sowmya Mccain MD  Principal Problem:Arteriovenous graft infection    Subjective:     HPI: Eva Bloom is a 57 y.o. female with medical history of ESRD on HD (T/Th/Sat), combined systolic and diastolic congestive heart failure (EF 20%), and AFib on Eliquis who presents for infection of L upper arm AVG. S/P excision on L upper arm AVG, followed by placement of L IJ permacath. Nephrology was consulted for management of ESRD/HD while inpatient.       Interval History: Seen and evaluated by bedside. No acute events. Feeling well this AM. Tolerating hemodialysis well.     Review of patient's allergies indicates:   Allergen Reactions    Coreg [carvedilol] Other (See Comments)     Nausea/vomiting    Allopurinol      Other reaction(s): abnormal transaminases     Current Facility-Administered Medications   Medication Frequency    0.9%  NaCl infusion Continuous    0.9%  NaCl infusion Once    amLODIPine tablet 5 mg Daily    aspirin EC tablet 81 mg Daily    atorvastatin tablet 40 mg Daily    hydrALAZINE tablet 100 mg Q12H    HYDROcodone-acetaminophen 5-325 mg per tablet 1 tablet Q4H PRN    metoprolol tartrate (LOPRESSOR) tablet 50 mg BID    oxyCODONE immediate release tablet Tab 10 mg Q6H PRN    piperacillin-tazobactam (ZOSYN) 4.5 g in dextrose 5 % in water (D5W) 5 % 100 mL IVPB (MB+) Q12H    vancomycin - pharmacy to dose pharmacy to manage frequency       Objective:     Vital Signs (Most Recent):  Temp: 97.2 °F (36.2 °C) (01/26/23 0427)  Pulse: 73 (01/26/23 0815)  Resp: 18 (01/25/23 2000)  BP: 120/70 (01/26/23 0815)  SpO2: 95 % (01/26/23 0558) Vital Signs (24h Range):  Temp:  [96.7 °F (35.9 °C)-98.3 °F (36.8 °C)] 97.2 °F (36.2 °C)  Pulse:  [62-81] 73  Resp:  [11-18] 18  SpO2:  [90 %-100  %] 95 %  BP: (105-141)/(60-78) 120/70     Weight: 44.9 kg (99 lb) (01/25/23 0646)  Body mass index is 16.99 kg/m².  Body surface area is 1.42 meters squared.    I/O last 3 completed shifts:  In: 909.7 [P.O.:600; IV Piggyback:309.7]  Out: -     Physical Exam  Constitutional:       General: She is not in acute distress.     Appearance: Normal appearance. She is not ill-appearing.   HENT:      Head: Normocephalic and atraumatic.      Nose: Nose normal.      Mouth/Throat:      Mouth: Mucous membranes are moist.      Pharynx: Oropharynx is clear.   Eyes:      Conjunctiva/sclera: Conjunctivae normal.   Cardiovascular:      Rate and Rhythm: Normal rate.      Comments: L IJ TDC   Pulmonary:      Effort: Pulmonary effort is normal.   Abdominal:      General: Abdomen is flat.   Musculoskeletal:      Cervical back: Neck supple.      Right lower leg: No edema.      Left lower leg: No edema.      Comments: Surgical dressing to ALISA   Skin:     General: Skin is warm and dry.   Neurological:      Mental Status: She is oriented to person, place, and time.   Psychiatric:         Mood and Affect: Mood normal.         Behavior: Behavior normal.       Significant Labs:  CBC:   Recent Labs   Lab 01/25/23  1204   WBC 6.89   RBC 3.09*   HGB 9.2*   HCT 31.1*   *   *   MCH 29.8   MCHC 29.6*     CMP:   Recent Labs   Lab 01/25/23  1204      CALCIUM 8.7      K 3.7   CO2 26      BUN 37*   CREATININE 4.9*  4.9*     Assessment/Plan:     * Arteriovenous graft infection  S/p LUE AVG excision (1/25/23)  Management per Vascular Surgery     Chronic kidney disease-mineral and bone disorder  -Obtain phos levels    Anemia in ESRD (end-stage renal disease)  -Transfuse for Hg <7.0  -Target Hg of 10-12    ESRD (end stage renal disease) on dialysis  Outpatient HD Information:    -Outpatient HD unit: [facility]   -Nephrologist: MD Tolu  -HD tx days: T/Th/Sat  -HD tx time: 180mins  -Last HD tx prior to presentation:  1/24/22  -HD access: L IJ TDC   -HD modality: iHD   -Residual urine:  -EDW: 45kg     Plan/Recommendations:    -HD today for metabolic clearance and volume management; tolerating well    -Renal diet, if not NPO   -Strict I/O's and daily weights  -Daily renal function panels   -Renally dose meds              Ulysses Barron MD  Nephrology  [facility]

## 2023-01-26 NOTE — PLAN OF CARE
Patient afebrile throughout shift, no complaints or acute changes. Teaching and learning completed for incentive spirometer with returned demonstration, will continue with plan of care.     Problem: Infection  Goal: Absence of Infection Signs and Symptoms  Outcome: Ongoing, Progressing     Problem: Adult Inpatient Plan of Care  Goal: Patient-Specific Goal (Individualized)  Outcome: Ongoing, Progressing     Problem: Adult Inpatient Plan of Care  Goal: Optimal Comfort and Wellbeing  Outcome: Ongoing, Progressing

## 2023-01-27 LAB
ANION GAP SERPL CALC-SCNC: 10 MMOL/L (ref 8–16)
BASOPHILS # BLD AUTO: 0.03 K/UL (ref 0–0.2)
BASOPHILS NFR BLD: 0.5 % (ref 0–1.9)
BUN SERPL-MCNC: 34 MG/DL (ref 6–20)
CALCIUM SERPL-MCNC: 8.3 MG/DL (ref 8.7–10.5)
CHLORIDE SERPL-SCNC: 105 MMOL/L (ref 95–110)
CO2 SERPL-SCNC: 22 MMOL/L (ref 23–29)
CREAT SERPL-MCNC: 4.1 MG/DL (ref 0.5–1.4)
DIFFERENTIAL METHOD: ABNORMAL
EOSINOPHIL # BLD AUTO: 0.4 K/UL (ref 0–0.5)
EOSINOPHIL NFR BLD: 7.1 % (ref 0–8)
ERYTHROCYTE [DISTWIDTH] IN BLOOD BY AUTOMATED COUNT: 21.2 % (ref 11.5–14.5)
EST. GFR  (NO RACE VARIABLE): 12 ML/MIN/1.73 M^2
GLUCOSE SERPL-MCNC: 86 MG/DL (ref 70–110)
HCT VFR BLD AUTO: 31.9 % (ref 37–48.5)
HGB BLD-MCNC: 9.4 G/DL (ref 12–16)
IMM GRANULOCYTES # BLD AUTO: 0.02 K/UL (ref 0–0.04)
IMM GRANULOCYTES NFR BLD AUTO: 0.3 % (ref 0–0.5)
LYMPHOCYTES # BLD AUTO: 0.9 K/UL (ref 1–4.8)
LYMPHOCYTES NFR BLD: 14.7 % (ref 18–48)
MAGNESIUM SERPL-MCNC: 2.1 MG/DL (ref 1.6–2.6)
MCH RBC QN AUTO: 30 PG (ref 27–31)
MCHC RBC AUTO-ENTMCNC: 29.5 G/DL (ref 32–36)
MCV RBC AUTO: 102 FL (ref 82–98)
MONOCYTES # BLD AUTO: 0.3 K/UL (ref 0.3–1)
MONOCYTES NFR BLD: 4.6 % (ref 4–15)
NEUTROPHILS # BLD AUTO: 4.3 K/UL (ref 1.8–7.7)
NEUTROPHILS NFR BLD: 72.8 % (ref 38–73)
NRBC BLD-RTO: 1 /100 WBC
PHOSPHATE SERPL-MCNC: 3.7 MG/DL (ref 2.7–4.5)
PLATELET # BLD AUTO: 167 K/UL (ref 150–450)
PMV BLD AUTO: 10.4 FL (ref 9.2–12.9)
POTASSIUM SERPL-SCNC: 3.5 MMOL/L (ref 3.5–5.1)
RBC # BLD AUTO: 3.13 M/UL (ref 4–5.4)
SODIUM SERPL-SCNC: 137 MMOL/L (ref 136–145)
VANCOMYCIN SERPL-MCNC: 17.7 UG/ML
WBC # BLD AUTO: 5.9 K/UL (ref 3.9–12.7)

## 2023-01-27 PROCEDURE — 85025 COMPLETE CBC W/AUTO DIFF WBC: CPT

## 2023-01-27 PROCEDURE — 25000003 PHARM REV CODE 250: Performed by: STUDENT IN AN ORGANIZED HEALTH CARE EDUCATION/TRAINING PROGRAM

## 2023-01-27 PROCEDURE — 83735 ASSAY OF MAGNESIUM: CPT

## 2023-01-27 PROCEDURE — 80202 ASSAY OF VANCOMYCIN: CPT

## 2023-01-27 PROCEDURE — 84100 ASSAY OF PHOSPHORUS: CPT

## 2023-01-27 PROCEDURE — 99233 SBSQ HOSP IP/OBS HIGH 50: CPT | Mod: GC,,, | Performed by: INTERNAL MEDICINE

## 2023-01-27 PROCEDURE — 27000207 HC ISOLATION

## 2023-01-27 PROCEDURE — 25000003 PHARM REV CODE 250: Performed by: SURGERY

## 2023-01-27 PROCEDURE — 87493 C DIFF AMPLIFIED PROBE: CPT | Performed by: STUDENT IN AN ORGANIZED HEALTH CARE EDUCATION/TRAINING PROGRAM

## 2023-01-27 PROCEDURE — 87449 NOS EACH ORGANISM AG IA: CPT | Performed by: STUDENT IN AN ORGANIZED HEALTH CARE EDUCATION/TRAINING PROGRAM

## 2023-01-27 PROCEDURE — 80048 BASIC METABOLIC PNL TOTAL CA: CPT

## 2023-01-27 PROCEDURE — 99233 PR SUBSEQUENT HOSPITAL CARE,LEVL III: ICD-10-PCS | Mod: GC,,, | Performed by: INTERNAL MEDICINE

## 2023-01-27 PROCEDURE — 63600175 PHARM REV CODE 636 W HCPCS: Performed by: STUDENT IN AN ORGANIZED HEALTH CARE EDUCATION/TRAINING PROGRAM

## 2023-01-27 PROCEDURE — 20600001 HC STEP DOWN PRIVATE ROOM

## 2023-01-27 RX ADMIN — ASPIRIN 81 MG: 81 TABLET, COATED ORAL at 09:01

## 2023-01-27 RX ADMIN — AMLODIPINE BESYLATE 5 MG: 5 TABLET ORAL at 09:01

## 2023-01-27 RX ADMIN — HYDRALAZINE HYDROCHLORIDE 100 MG: 50 TABLET ORAL at 08:01

## 2023-01-27 RX ADMIN — METOPROLOL TARTRATE 50 MG: 50 TABLET, FILM COATED ORAL at 09:01

## 2023-01-27 RX ADMIN — METOPROLOL TARTRATE 50 MG: 50 TABLET, FILM COATED ORAL at 04:01

## 2023-01-27 RX ADMIN — METOPROLOL TARTRATE 50 MG: 50 TABLET, FILM COATED ORAL at 08:01

## 2023-01-27 RX ADMIN — HYDRALAZINE HYDROCHLORIDE 100 MG: 50 TABLET ORAL at 09:01

## 2023-01-27 RX ADMIN — MUPIROCIN: 20 OINTMENT TOPICAL at 09:01

## 2023-01-27 RX ADMIN — PIPERACILLIN SODIUM AND TAZOBACTAM SODIUM 4.5 G: 4; .5 INJECTION, POWDER, LYOPHILIZED, FOR SOLUTION INTRAVENOUS at 03:01

## 2023-01-27 RX ADMIN — ATORVASTATIN CALCIUM 40 MG: 40 TABLET, FILM COATED ORAL at 09:01

## 2023-01-27 RX ADMIN — MUPIROCIN: 20 OINTMENT TOPICAL at 08:01

## 2023-01-27 RX ADMIN — GUAIFENESIN AND DEXTROMETHORPHAN 5 ML: 100; 10 SYRUP ORAL at 12:01

## 2023-01-27 NOTE — ASSESSMENT & PLAN NOTE
57yM w/ PMH HTN, HFrEF, RCC s/p nephrectomy, and ESRD on HD with infected LUE AVG now s/p excision of AVG 1/25.    - ASA, statin  - Amlodipine, hydralazine, and metoprolol  - PRN pain meds  - Nephrology following for dialysis  - Wound vac placed yesterday per vascular  Infectious disease recs as following:  - Draw blood cultures to help determine treatment course and hematogenous spread  - Continue broad spectrum coverage with Vancomycin  - DC Zosyn  - Test for C Diff (hx of C Diff and now has multiple days of watery stools prior to current hospitalization)  - Consider cough alleviation with Tesslon-Perles    Dispo: Appreciate SW assistance for home wound vac, coordinate antibiotics with dialysis center

## 2023-01-27 NOTE — PROGRESS NOTES
[facility]  Infectious Disease  Progress Note    Patient Name: Eva Bloom  MRN: [id number]  Admission Date: 1/25/2023  Length of Stay: 2 days  Attending Physician: FARIDEH Muñoz III, MD  Primary Care Provider: Sowmya Mccain MD    Isolation Status: Special Contact  Assessment/Plan:      * Arteriovenous graft infection  Patient with AVG infection from old graft with source control achieved via graft removal. Had localized symptoms and no vitals/labs on admission indicative of sepsis, however blood cultures will need to be drawn as this is a factor in therapy duration. Anticipate a 4-6 week course of antibiotics to cover for infection. At this time:    - Draw blood cultures to help determine treatment course and hematogenous spread  - Continue broad spectrum coverage with Vancomycin pending susceptibilities   - f/u C Diff collected (hx of C Diff and now has multiple days of watery stools prior to current hospitalization)  - f/u CBC to determine WBC trend        Thank you for your consult. I will follow-up with patient. Please contact us if you have any additional questions.    Cordell Bach MD  Infectious Disease  [facility]    Subjective:     Principal Problem:Arteriovenous graft infection    HPI: Ms. Bloom is a 57 y.o. F PMHx of ESRD on HD (LUE AVF), RCC (s/p L nephrectomy in 2016), HFrEF (EF 25%) s/p AICD, Afib on Eliquis presenting to Vascular clinic with redness and swelling at her old LUE AV graft that was replaced with a newer LUE graft in mid-December 2022. With concerns for a graft infection patient was prescribed Keflex, admitted, older graft was excised on 01/25, OR cultures collected, and newer LUE graft was left intact as it did not apper infected and didn't communicate with old graft. In addition, a L IJ tunneled cath was placed. ID consulted for duration of antibiotics course in patient with AVG infection.     Interval History: Patient reports no RUE pain today.     Review of  Systems   Constitutional:  Negative for activity change, appetite change and fever.   Respiratory:  Positive for cough. Negative for shortness of breath and wheezing.    Cardiovascular:  Negative for chest pain and leg swelling.   Gastrointestinal:  Negative for abdominal pain, constipation, diarrhea, nausea and vomiting.   Musculoskeletal:  Negative for myalgias.   Skin:  Negative for rash.   Neurological:  Negative for headaches.   Objective:     Vital Signs (Most Recent):  Temp: 96.5 °F (35.8 °C) (01/27/23 0908)  Pulse: 76 (01/27/23 1200)  Resp: 18 (01/27/23 1200)  BP: 139/65 (01/27/23 1200)  SpO2: 98 % (01/27/23 1200) Vital Signs (24h Range):  Temp:  [96.5 °F (35.8 °C)-98.7 °F (37.1 °C)] 96.5 °F (35.8 °C)  Pulse:  [62-76] 76  Resp:  [18] 18  SpO2:  [94 %-98 %] 98 %  BP: (117-151)/(46-67) 139/65     Weight: 44.9 kg (99 lb)  Body mass index is 16.99 kg/m².    Estimated Creatinine Clearance: 10.6 mL/min (A) (based on SCr of 4.1 mg/dL (H)).    Physical Exam  Vitals and nursing note reviewed.   Constitutional:       Appearance: Normal appearance.   HENT:      Head: Normocephalic and atraumatic.   Eyes:      Extraocular Movements: Extraocular movements intact.      Conjunctiva/sclera: Conjunctivae normal.   Cardiovascular:      Rate and Rhythm: Normal rate and regular rhythm.      Pulses: Normal pulses.      Comments: L chest tunneled cath in place  Pulmonary:      Effort: Pulmonary effort is normal. No respiratory distress.      Breath sounds: Normal breath sounds. No wheezing or rales.   Abdominal:      Palpations: Abdomen is soft.      Tenderness: There is no abdominal tenderness. There is no guarding.   Musculoskeletal:         General: Tenderness (LUE fistula site with well secured wound vac) present.      Cervical back: Normal range of motion.      Right lower leg: No edema.      Left lower leg: No edema.   Neurological:      General: No focal deficit present.      Mental Status: She is alert and oriented to  person, place, and time.   Psychiatric:         Mood and Affect: Mood normal.       Significant Labs: All pertinent labs within the past 24 hours have been reviewed.    Significant Imaging: I have reviewed all pertinent imaging results/findings within the past 24 hours.

## 2023-01-27 NOTE — SUBJECTIVE & OBJECTIVE
Medications:  Continuous Infusions:   sodium chloride 0.9% 70 mL/hr at 01/25/23 0725     Scheduled Meds:   amLODIPine  5 mg Oral Daily    aspirin  81 mg Oral Daily    atorvastatin  40 mg Oral Daily    hydrALAZINE  100 mg Oral Q12H    metoprolol tartrate  50 mg Oral BID    mupirocin   Nasal BID    piperacillin-tazobactam (ZOSYN) IVPB  4.5 g Intravenous Q12H     PRN Meds:dextromethorphan-guaiFENesin  mg/5 ml, heparin (porcine), HYDROcodone-acetaminophen, oxyCODONE, Pharmacy to dose Vancomycin consult **AND** vancomycin - pharmacy to dose     Objective:     Vital Signs (Most Recent):  Temp: 97.4 °F (36.3 °C) (01/27/23 0451)  Pulse: 66 (01/27/23 0456)  Resp: 18 (01/26/23 2259)  BP: (!) 151/57 (01/27/23 0456)  SpO2: 96 % (01/27/23 0451)   Vital Signs (24h Range):  Temp:  [97.4 °F (36.3 °C)-98.7 °F (37.1 °C)] 97.4 °F (36.3 °C)  Pulse:  [56-75] 66  Resp:  [15-18] 18  SpO2:  [94 %-96 %] 96 %  BP: ()/(46-71) 151/57         Physical Exam  Constitutional:       General: She is not in acute distress.     Appearance: Normal appearance.   HENT:      Head: Normocephalic and atraumatic.      Mouth/Throat:      Mouth: Mucous membranes are moist.   Eyes:      Extraocular Movements: Extraocular movements intact.      Conjunctiva/sclera: Conjunctivae normal.   Cardiovascular:      Rate and Rhythm: Normal rate and regular rhythm.   Pulmonary:      Effort: Pulmonary effort is normal. No respiratory distress.   Abdominal:      General: There is no distension.      Palpations: Abdomen is soft.   Musculoskeletal:         General: Normal range of motion.      Comments: LUE graft excision site with WTD dressing in place, no purulent drainage   Skin:     General: Skin is warm and dry.      Capillary Refill: Capillary refill takes less than 2 seconds.      Comments: LUE graft excision site with wound vac in place, minimal drainage   Neurological:      General: No focal deficit present.      Mental Status: She is alert and oriented  to person, place, and time. Mental status is at baseline.   Psychiatric:         Mood and Affect: Mood normal.       Significant Labs:  CBC:   Recent Labs   Lab 01/25/23  1204   WBC 6.89   RBC 3.09*   HGB 9.2*   HCT 31.1*   *   *   MCH 29.8   MCHC 29.6*     CMP:   Recent Labs   Lab 01/26/23  0810   *   CALCIUM 8.2*   ALBUMIN 1.7*      K 3.9   CO2 24      BUN 51*   CREATININE 5.9*       Significant Diagnostics:  I have reviewed all pertinent imaging results/findings within the past 24 hours.

## 2023-01-27 NOTE — PLAN OF CARE
[facility]  Discharge Reassessment    Primary Care Provider: Sowmya Mccain MD    Expected Discharge Date: 1/30/2023    Reassessment (most recent)       Discharge Reassessment - 01/27/23 1117          Discharge Reassessment    Assessment Type Discharge Planning Reassessment     Did the patient's condition or plan change since previous assessment? No     Discharge Plan discussed with: Patient     Communicated MAVIS with patient/caregiver Yes     Discharge Plan A Home Health     DME Needed Upon Discharge  negative pressure wound therapy device     Discharge Barriers Identified None     Why the patient remains in the hospital Requires continued medical care        Post-Acute Status    Post-Acute Authorization Home Health     Home Health Status Referrals Sent     Coverage Medicare     Hospital Resources/Appts/Education Provided Provided patient/caregiver with written discharge plan information     Patient choice form signed by patient/caregiver List with quality metrics by geographic area provided     Discharge Delays None known at this time                   Pt not ready for discharge due to: Not medically ready  SW will remain available for families in [facility].  Currently pt has d/c plans in progress at this time.    [employer] currently processing Wound vac order.       Ruth Cunningham LCSW  Case Management/[facility]  [phone]

## 2023-01-27 NOTE — PROGRESS NOTES
[facility]  Vascular Surgery  Progress Note    Patient Name: Eva Bloom  MRN: [id number]  Admission Date: 1/25/2023  Primary Care Provider: Sowmya Mccain MD    Subjective:     Interval History: LUZMARIA ABDI.  Patient feeling well this am, no complaints.  Discharge pending wound vac delivery.    Post-Op Info:  Procedure(s) (LRB):  REMOVAL, GRAFT, ARTERIOVENOUS, UPPER EXTREMITY (Left)  INSERTION, DUAL LUMEN CATHETER WITH PORT, WITH IMAGING GUIDANCE (N/A)   2 Days Post-Op       Medications:  Continuous Infusions:   sodium chloride 0.9% 70 mL/hr at 01/25/23 0725     Scheduled Meds:   amLODIPine  5 mg Oral Daily    aspirin  81 mg Oral Daily    atorvastatin  40 mg Oral Daily    hydrALAZINE  100 mg Oral Q12H    metoprolol tartrate  50 mg Oral BID    mupirocin   Nasal BID    piperacillin-tazobactam (ZOSYN) IVPB  4.5 g Intravenous Q12H     PRN Meds:dextromethorphan-guaiFENesin  mg/5 ml, heparin (porcine), HYDROcodone-acetaminophen, oxyCODONE, Pharmacy to dose Vancomycin consult **AND** vancomycin - pharmacy to dose     Objective:     Vital Signs (Most Recent):  Temp: 97.4 °F (36.3 °C) (01/27/23 0451)  Pulse: 66 (01/27/23 0456)  Resp: 18 (01/26/23 2259)  BP: (!) 151/57 (01/27/23 0456)  SpO2: 96 % (01/27/23 0451)   Vital Signs (24h Range):  Temp:  [97.4 °F (36.3 °C)-98.7 °F (37.1 °C)] 97.4 °F (36.3 °C)  Pulse:  [56-75] 66  Resp:  [15-18] 18  SpO2:  [94 %-96 %] 96 %  BP: ()/(46-71) 151/57         Physical Exam  Constitutional:       General: She is not in acute distress.     Appearance: Normal appearance.   HENT:      Head: Normocephalic and atraumatic.      Mouth/Throat:      Mouth: Mucous membranes are moist.   Eyes:      Extraocular Movements: Extraocular movements intact.      Conjunctiva/sclera: Conjunctivae normal.   Cardiovascular:      Rate and Rhythm: Normal rate and regular rhythm.   Pulmonary:      Effort: Pulmonary effort is normal. No respiratory distress.   Abdominal:      General:  There is no distension.      Palpations: Abdomen is soft.   Musculoskeletal:         General: Normal range of motion.      Comments: LUE graft excision site with WTD dressing in place, no purulent drainage   Skin:     General: Skin is warm and dry.      Capillary Refill: Capillary refill takes less than 2 seconds.      Comments: LUE graft excision site with wound vac in place, minimal drainage   Neurological:      General: No focal deficit present.      Mental Status: She is alert and oriented to person, place, and time. Mental status is at baseline.   Psychiatric:         Mood and Affect: Mood normal.       Significant Labs:  CBC:   Recent Labs   Lab 01/25/23  1204   WBC 6.89   RBC 3.09*   HGB 9.2*   HCT 31.1*   *   *   MCH 29.8   MCHC 29.6*     CMP:   Recent Labs   Lab 01/26/23  0810   *   CALCIUM 8.2*   ALBUMIN 1.7*      K 3.9   CO2 24      BUN 51*   CREATININE 5.9*       Significant Diagnostics:  I have reviewed all pertinent imaging results/findings within the past 24 hours.    Assessment/Plan:     * Arteriovenous graft infection  57yM w/ PMH HTN, HFrEF, RCC s/p nephrectomy, and ESRD on HD with infected LUE AVG now s/p excision of AVG 1/25.    - ASA, statin  - Amlodipine, hydralazine, and metoprolol  - PRN pain meds  - Nephrology following for dialysis  - Wound vac placed yesterday per vascular  Infectious disease recs as following:  - Draw blood cultures to help determine treatment course and hematogenous spread  - Continue broad spectrum coverage with Vancomycin  - DC Zosyn  - Test for C Diff (hx of C Diff and now has multiple days of watery stools prior to current hospitalization)  - Consider cough alleviation with Tesslon-Perles    Dispo: Appreciate SW assistance for home wound vac, coordinate antibiotics with dialysis center        Violette Robledo NP  Vascular Surgery  [facility]

## 2023-01-27 NOTE — PLAN OF CARE
Problem: Infection  Goal: Absence of Infection Signs and Symptoms  Outcome: Ongoing, Progressing     Problem: Adult Inpatient Plan of Care  Goal: Plan of Care Review  Outcome: Ongoing, Progressing  Goal: Patient-Specific Goal (Individualized)  Outcome: Ongoing, Progressing  Goal: Absence of Hospital-Acquired Illness or Injury  Outcome: Ongoing, Progressing  Goal: Optimal Comfort and Wellbeing  Outcome: Ongoing, Progressing  Goal: Readiness for Transition of Care  Outcome: Ongoing, Progressing     Problem: Impaired Wound Healing  Goal: Optimal Wound Healing  Outcome: Ongoing, Progressing     Problem: Device-Related Complication Risk (Hemodialysis)  Goal: Safe, Effective Therapy Delivery  Outcome: Ongoing, Progressing     Problem: Hemodynamic Instability (Hemodialysis)  Goal: Effective Tissue Perfusion  Outcome: Ongoing, Progressing     Problem: Infection (Hemodialysis)  Goal: Absence of Infection Signs and Symptoms  Outcome: Ongoing, Progressing     Problem: Fall Injury Risk  Goal: Absence of Fall and Fall-Related Injury  Outcome: Ongoing, Progressing

## 2023-01-27 NOTE — CONSULTS
NIAS received call of patient's current midline not flushing.  Went to see patient.  After assessing patient, midline was removed due to catheter being kinked.  Midline replaced to Right brachial vein, 18 g x 8 cm, lot #[id number].  Max dwell date 2/24/23.  Patient tolerated well.  Nurse aware.

## 2023-01-27 NOTE — ASSESSMENT & PLAN NOTE
Patient with AVG infection from old graft with source control achieved via graft removal. Had localized symptoms and no vitals/labs on admission indicative of sepsis, however blood cultures will need to be drawn as this is a factor in therapy duration. Anticipate a 4-6 week course of antibiotics to cover for infection. At this time:    - Draw blood cultures to help determine treatment course and hematogenous spread  - Continue broad spectrum coverage with Vancomycin pending susceptibilities   - f/u C Diff collected (hx of C Diff and now has multiple days of watery stools prior to current hospitalization)  - f/u CBC to determine WBC trend

## 2023-01-27 NOTE — PROGRESS NOTES
Pharmacokinetic Assessment Follow Up: IV Vancomycin    Vancomycin serum concentration assessment(s):    -Vancomycin random level was added on to AM lab draws.  -Level of 17.7 mcg/mL within goal 15-20 mcg/mL AV graft infection, Staph spp.  -ESRD on iHD, HD plans in for 1/28.     Vancomycin Regimen Plan:    -Ok to hold dose today, no HD.  -VR tomorrow morning, pre HD.  -Re-dose for level < 20 mcg/mL (post HD), and based off HD plans.     Drug levels (last 3 results):  Recent Labs   Lab Result Units 01/26/23  0340 01/27/23  1145   Vancomycin, Random ug/mL 13.9 17.7       Pharmacy will continue to follow and monitor vancomycin.    Please contact pharmacy at extension [phone] for questions regarding this assessment.    Thank you for the consult,   Daniel Nuñez       Patient brief summary:  Eva Bloom is a 57 y.o. female initiated on antimicrobial therapy with IV Vancomycin for treatment of skin & soft tissue infection    Drug Allergies:   Review of patient's allergies indicates:   Allergen Reactions    Coreg [carvedilol] Other (See Comments)     Nausea/vomiting    Allopurinol      Other reaction(s): abnormal transaminases       Actual Body Weight:   44.9 kg    Renal Function:   Estimated Creatinine Clearance: 10.6 mL/min (A) (based on SCr of 4.1 mg/dL (H)).,     Dialysis Method (if applicable):  N/A    CBC (last 72 hours):  Recent Labs   Lab Result Units 01/25/23  1204 01/27/23  1145   WBC K/uL 6.89 5.90   Hemoglobin g/dL 9.2* 9.4*   Hematocrit % 31.1* 31.9*   Platelets K/uL 136* 167   Gran % % 85.2* 72.8   Lymph % % 8.7* 14.7*   Mono % % 2.5* 4.6   Eosinophil % % 2.3 7.1   Basophil % % 0.4 0.5   Differential Method  Automated Automated       Metabolic Panel (last 72 hours):  Recent Labs   Lab Result Units 01/25/23  1204 01/26/23  0810 01/27/23  1145   Sodium mmol/L 140 139 137   Potassium mmol/L 3.7 3.9 3.5   Chloride mmol/L 103 100 105   CO2 mmol/L 26 24 22*   Glucose mg/dL 105 119* 86   BUN mg/dL 37* 51* 34*    Creatinine mg/dL 4.9*  4.9* 5.9* 4.1*   Albumin g/dL  --  1.7*  --    Magnesium mg/dL  --   --  2.1   Phosphorus mg/dL  --  5.2* 3.7       Vancomycin Administrations:  vancomycin given in the last 96 hours                     vancomycin (VANCOCIN) 500 mg in dextrose 5 % in water (D5W) 5 % 100 mL IVPB (MB+) (mg) 500 mg New Bag 01/26/23 1300    vancomycin (VANCOCIN) injection 1 g (g) 1 g Given 01/25/23 0832                    Microbiologic Results:  Microbiology Results (last 7 days)       Procedure Component Value Units Date/Time    Clostridium difficile EIA [[id number]] Collected: 01/27/23 1000    Order Status: Sent Specimen: Stool Updated: 01/27/23 1353    Culture, Anaerobe [[id number]] Collected: 01/25/23 0830    Order Status: Completed Specimen: Incision site from Arm, Left Updated: 01/27/23 1324     Anaerobic Culture Culture in progress    Narrative:      1. Left Arm AV Graft    Aerobic culture [[id number]]  (Abnormal) Collected: 01/25/23 0830    Order Status: Completed Specimen: Incision site from Arm, Left Updated: 01/27/23 1147     Aerobic Bacterial Culture STAPHYLOCOCCUS SPECIES  Moderate  Identification and susceptibility pending      Narrative:      1. Left arm AV Graft    Blood culture [[id number]]     Order Status: No result Specimen: Blood     Blood culture [[id number]]     Order Status: No result Specimen: Blood     Gram stain [[id number]] Collected: 01/25/23 0830    Order Status: Completed Specimen: Incision site from Arm, Left Updated: 01/25/23 1404     Gram Stain Result Rare WBC's      No organisms seen    Narrative:      1. Left arm AV graft

## 2023-01-27 NOTE — SUBJECTIVE & OBJECTIVE
Interval History: Patient reports no RUE pain today.     Review of Systems   Constitutional:  Negative for activity change, appetite change and fever.   Respiratory:  Positive for cough. Negative for shortness of breath and wheezing.    Cardiovascular:  Negative for chest pain and leg swelling.   Gastrointestinal:  Negative for abdominal pain, constipation, diarrhea, nausea and vomiting.   Musculoskeletal:  Negative for myalgias.   Skin:  Negative for rash.   Neurological:  Negative for headaches.   Objective:     Vital Signs (Most Recent):  Temp: 96.5 °F (35.8 °C) (01/27/23 0908)  Pulse: 76 (01/27/23 1200)  Resp: 18 (01/27/23 1200)  BP: 139/65 (01/27/23 1200)  SpO2: 98 % (01/27/23 1200) Vital Signs (24h Range):  Temp:  [96.5 °F (35.8 °C)-98.7 °F (37.1 °C)] 96.5 °F (35.8 °C)  Pulse:  [62-76] 76  Resp:  [18] 18  SpO2:  [94 %-98 %] 98 %  BP: (117-151)/(46-67) 139/65     Weight: 44.9 kg (99 lb)  Body mass index is 16.99 kg/m².    Estimated Creatinine Clearance: 10.6 mL/min (A) (based on SCr of 4.1 mg/dL (H)).    Physical Exam  Vitals and nursing note reviewed.   Constitutional:       Appearance: Normal appearance.   HENT:      Head: Normocephalic and atraumatic.   Eyes:      Extraocular Movements: Extraocular movements intact.      Conjunctiva/sclera: Conjunctivae normal.   Cardiovascular:      Rate and Rhythm: Normal rate and regular rhythm.      Pulses: Normal pulses.      Comments: L chest tunneled cath in place  Pulmonary:      Effort: Pulmonary effort is normal. No respiratory distress.      Breath sounds: Normal breath sounds. No wheezing or rales.   Abdominal:      Palpations: Abdomen is soft.      Tenderness: There is no abdominal tenderness. There is no guarding.   Musculoskeletal:         General: Tenderness (LUE fistula site with well secured wound vac) present.      Cervical back: Normal range of motion.      Right lower leg: No edema.      Left lower leg: No edema.   Neurological:      General: No focal  deficit present.      Mental Status: She is alert and oriented to person, place, and time.   Psychiatric:         Mood and Affect: Mood normal.       Significant Labs: All pertinent labs within the past 24 hours have been reviewed.    Significant Imaging: I have reviewed all pertinent imaging results/findings within the past 24 hours.

## 2023-01-27 NOTE — NURSING
Pt with zosyn infusing, pump continues to alarm and repost occlusion at side of pt. Disconected abx, attempted to flush. Extreme resistance noted and only able to flush 3 cc NSL, despite repositioning.

## 2023-01-28 LAB
ALBUMIN SERPL BCP-MCNC: 1.7 G/DL (ref 3.5–5.2)
ANION GAP SERPL CALC-SCNC: 13 MMOL/L (ref 8–16)
BACTERIA SPEC AEROBE CULT: ABNORMAL
BUN SERPL-MCNC: 36 MG/DL (ref 6–20)
C DIFF GDH STL QL: POSITIVE
C DIFF TOX A+B STL QL IA: NEGATIVE
C DIFF TOX GENS STL QL NAA+PROBE: POSITIVE
CALCIUM SERPL-MCNC: 8.1 MG/DL (ref 8.7–10.5)
CHLORIDE SERPL-SCNC: 106 MMOL/L (ref 95–110)
CO2 SERPL-SCNC: 18 MMOL/L (ref 23–29)
CREAT SERPL-MCNC: 4.8 MG/DL (ref 0.5–1.4)
EST. GFR  (NO RACE VARIABLE): 9.9 ML/MIN/1.73 M^2
GLUCOSE SERPL-MCNC: 114 MG/DL (ref 70–110)
PHOSPHATE SERPL-MCNC: 4.5 MG/DL (ref 2.7–4.5)
POTASSIUM SERPL-SCNC: 3.3 MMOL/L (ref 3.5–5.1)
SODIUM SERPL-SCNC: 137 MMOL/L (ref 136–145)
VANCOMYCIN SERPL-MCNC: 12.2 UG/ML

## 2023-01-28 PROCEDURE — 25000003 PHARM REV CODE 250: Performed by: STUDENT IN AN ORGANIZED HEALTH CARE EDUCATION/TRAINING PROGRAM

## 2023-01-28 PROCEDURE — 36415 COLL VENOUS BLD VENIPUNCTURE: CPT | Performed by: SURGERY

## 2023-01-28 PROCEDURE — 63600175 PHARM REV CODE 636 W HCPCS: Performed by: INTERNAL MEDICINE

## 2023-01-28 PROCEDURE — 25000003 PHARM REV CODE 250: Performed by: SURGERY

## 2023-01-28 PROCEDURE — 27000207 HC ISOLATION

## 2023-01-28 PROCEDURE — 90935 HEMODIALYSIS ONE EVALUATION: CPT | Mod: ,,, | Performed by: NURSE PRACTITIONER

## 2023-01-28 PROCEDURE — 99233 SBSQ HOSP IP/OBS HIGH 50: CPT | Mod: GC,,, | Performed by: INTERNAL MEDICINE

## 2023-01-28 PROCEDURE — 80069 RENAL FUNCTION PANEL: CPT | Performed by: SURGERY

## 2023-01-28 PROCEDURE — 99233 PR SUBSEQUENT HOSPITAL CARE,LEVL III: ICD-10-PCS | Mod: GC,,, | Performed by: INTERNAL MEDICINE

## 2023-01-28 PROCEDURE — 20600001 HC STEP DOWN PRIVATE ROOM

## 2023-01-28 PROCEDURE — 90935 PR HEMODIALYSIS, ONE EVALUATION: ICD-10-PCS | Mod: ,,, | Performed by: NURSE PRACTITIONER

## 2023-01-28 PROCEDURE — 63600175 PHARM REV CODE 636 W HCPCS: Performed by: STUDENT IN AN ORGANIZED HEALTH CARE EDUCATION/TRAINING PROGRAM

## 2023-01-28 PROCEDURE — 80100014 HC HEMODIALYSIS 1:1

## 2023-01-28 PROCEDURE — 80202 ASSAY OF VANCOMYCIN: CPT | Performed by: SURGERY

## 2023-01-28 RX ADMIN — MUPIROCIN: 20 OINTMENT TOPICAL at 08:01

## 2023-01-28 RX ADMIN — DEXTROSE MONOHYDRATE 3 G: 50 INJECTION, SOLUTION INTRAVENOUS at 05:01

## 2023-01-28 RX ADMIN — METOPROLOL TARTRATE 50 MG: 50 TABLET, FILM COATED ORAL at 08:01

## 2023-01-28 RX ADMIN — VANCOMYCIN HYDROCHLORIDE 125 MG: KIT at 05:01

## 2023-01-28 RX ADMIN — VANCOMYCIN HYDROCHLORIDE 125 MG: KIT at 12:01

## 2023-01-28 RX ADMIN — HYDRALAZINE HYDROCHLORIDE 100 MG: 50 TABLET ORAL at 08:01

## 2023-01-28 RX ADMIN — HEPARIN SODIUM 1000 UNITS: 1000 INJECTION, SOLUTION INTRAVENOUS; SUBCUTANEOUS at 10:01

## 2023-01-28 RX ADMIN — ASPIRIN 81 MG: 81 TABLET, COATED ORAL at 11:01

## 2023-01-28 RX ADMIN — METOPROLOL TARTRATE 50 MG: 50 TABLET, FILM COATED ORAL at 11:01

## 2023-01-28 RX ADMIN — ATORVASTATIN CALCIUM 40 MG: 40 TABLET, FILM COATED ORAL at 11:01

## 2023-01-28 RX ADMIN — MUPIROCIN: 20 OINTMENT TOPICAL at 09:01

## 2023-01-28 NOTE — PROGRESS NOTES
[facility]  Vascular Surgery  Progress Note    Patient Name: Eva Bloom  MRN: [id number]  Admission Date: 1/25/2023  Primary Care Provider: Sowmya Mccain MD    Subjective:     Interval History: LUZMARIA ABDI.  Patient feeling well this am, no complaints.  Discharge pending wound vac delivery.    Post-Op Info:  Procedure(s) (LRB):  REMOVAL, GRAFT, ARTERIOVENOUS, UPPER EXTREMITY (Left)  INSERTION, DUAL LUMEN CATHETER WITH PORT, WITH IMAGING GUIDANCE (N/A)   3 Days Post-Op     No new subjective & objective note has been filed under this hospital service since the last note was generated.    Assessment/Plan:     * Arteriovenous graft infection  57yM w/ PMH HTN, HFrEF, RCC s/p nephrectomy, and ESRD on HD with infected LUE AVG now s/p excision of AVG 1/25.    - ASA, statin  - Amlodipine, hydralazine, and metoprolol  - PRN pain meds  - Nephrology following for dialysis  - Wound vac placed yesterday per vascular  Infectious disease recs as following:  - Draw blood cultures to help determine treatment course and hematogenous spread  - Continue broad spectrum coverage with Vancomycin will change to PO VANC  - Test for C Diff is + (hx of C Diff and now has multiple days of watery stools prior to current hospitalization)  - Consider cough alleviation with Tesstrenan-Perles    Dispo: Appreciate SW assistance for home wound vac, coordinate antibiotics with Infectious disease        Brenden Celeste MD  Vascular Surgery  [facility]

## 2023-01-28 NOTE — ASSESSMENT & PLAN NOTE
Patient with AVG infection from old graft with source control achieved via graft removal. Had localized symptoms and no vitals/labs on admission indicative of sepsis, however blood cultures will need to be drawn as this is a factor in therapy duration. Anticipate a 4-6 week course of antibiotics to cover for infection. Staph species identified as Staph epidermidis susceptible to penicillin and oxacillin.     Recommendations:   --Switch to IV cefazolin dosed with HD   --Anticipated stop date 3/8/23 to complete 6 week endovascular course   --See OPAT below      Outpatient Antibiotic Therapy Plan:    Please send referral to [facility].    1) Infection: AV graft infected with Staph epidermidis     2) Discharge Antibiotics:    Intravenous antibiotics:   IV cefazolin 2 g, 2 g, 3 g (corresponding to HD days)      3) Therapy Duration:  6 weeks     Estimated end date of IV antibiotics: 3/8/23    4) Outpatient Weekly Labs:    Order the following labs to be drawn on Mondays:    CBC   CMP     5) Fax Lab Results to Infectious Diseases Provider: Dr Gallego     [facility] Fax Number: [phone]    6) Outpatient Infectious Diseases Follow-up     Follow-up appointment will be arranged by the ID clinic and will be found in the patient's appointments tab.     Prior to discharge, please ensure the patient's follow-up has been scheduled.     If there is still no follow-up scheduled prior to discharge, please send an EPIC message to Matilda Aguillon in Infectious Diseases.

## 2023-01-28 NOTE — PROGRESS NOTES
HD tx complete, 1 L removed over 3 hours, tolerated well. Blood returned via [facility] CVC. Both lumens flushed with NS, hep locked, capped, and wrapped. Report given to primary nurse Justyn. Transferred from unit via w/c. VSS, nad noted

## 2023-01-28 NOTE — ASSESSMENT & PLAN NOTE
First episode in December 2022 treated with 10 days of PO vancomycin. Now with positive C diff PCR again and loose stools. Agree with PO vancomycin tapering regimen ordered by primary team. Patient educated that there is chance of relapsing diarrhea following completion of course at [facility].

## 2023-01-28 NOTE — SUBJECTIVE & OBJECTIVE
Interval History: Patient afebrile with no leukocytosis. AV graft growing Staph epidermidis sensitive to penicillins. Will plan for 6 week course with HD.     Review of Systems   Constitutional:  Negative for activity change, appetite change and fever.   Respiratory:  Positive for cough. Negative for shortness of breath and wheezing.    Cardiovascular:  Negative for chest pain and leg swelling.   Gastrointestinal:  Positive for diarrhea. Negative for abdominal pain, constipation, nausea and vomiting.   Musculoskeletal:  Negative for myalgias.   Skin:  Negative for rash.   Neurological:  Negative for headaches.   Objective:     Vital Signs (Most Recent):  Temp: 97.7 °F (36.5 °C) (01/28/23 0725)  Pulse: 69 (01/28/23 1000)  Resp: 16 (01/28/23 0725)  BP: 127/65 (01/28/23 1000)  SpO2: (!) 93 % (01/28/23 0426)   Vital Signs (24h Range):  Temp:  [96.4 °F (35.8 °C)-98 °F (36.7 °C)] 97.7 °F (36.5 °C)  Pulse:  [63-86] 69  Resp:  [16-20] 16  SpO2:  [93 %-98 %] 93 %  BP: ()/(56-78) 127/65     Weight: 44.9 kg (99 lb)  Body mass index is 16.99 kg/m².    Estimated Creatinine Clearance: 9.1 mL/min (A) (based on SCr of 4.8 mg/dL (H)).    Physical Exam  Constitutional:       Appearance: Normal appearance.   Cardiovascular:      Rate and Rhythm: Normal rate and regular rhythm.      Pulses: Normal pulses.      Comments: L chest tunneled cath in place  Pulmonary:      Effort: Pulmonary effort is normal. No respiratory distress.      Breath sounds: Normal breath sounds. No wheezing or rales.   Abdominal:      Palpations: Abdomen is soft.      Tenderness: There is no abdominal tenderness. There is no guarding.   Musculoskeletal:         General: Tenderness (LUE fistula site with well secured wound vac) present.      Cervical back: Normal range of motion.      Right lower leg: No edema.      Left lower leg: No edema.   Neurological:      Mental Status: She is alert.       Significant Labs: All pertinent labs within the past 24 hours  have been reviewed.    Significant Imaging: I have reviewed all pertinent imaging results/findings within the past 24 hours.

## 2023-01-28 NOTE — PROGRESS NOTES
[facility]  Infectious Disease  Progress Note    Patient Name: Eva Bloom  MRN: [id number]  Admission Date: 1/25/2023  Length of Stay: 3 days  Attending Physician: FARIDEH Muñoz III, MD  Primary Care Provider: Sowmya Mccain MD    Isolation Status: Special Contact  Assessment/Plan:      * Arteriovenous graft infection  Patient with AVG infection from old graft with source control achieved via graft removal. Had localized symptoms and no vitals/labs on admission indicative of sepsis, however blood cultures will need to be drawn as this is a factor in therapy duration. Anticipate a 4-6 week course of antibiotics to cover for infection. Staph species identified as Staph epidermidis susceptible to penicillin and oxacillin.     Recommendations:   --Switch to IV cefazolin dosed with HD   --Anticipated stop date 3/8/23 to complete 6 week endovascular course   --See OPAT below      Outpatient Antibiotic Therapy Plan:    Please send referral to [facility].    1) Infection: AV graft infected with Staph epidermidis     2) Discharge Antibiotics:    Intravenous antibiotics:   IV cefazolin 2 g, 2 g, 3 g (corresponding to HD days)      3) Therapy Duration:  6 weeks     Estimated end date of IV antibiotics: 3/8/23    4) Outpatient Weekly Labs:    Order the following labs to be drawn on Mondays:    CBC   CMP     5) Fax Lab Results to Infectious Diseases Provider: Dr Gallego     [facility] Fax Number: [phone]    6) Outpatient Infectious Diseases Follow-up     Follow-up appointment will be arranged by the ID clinic and will be found in the patient's appointments tab.     Prior to discharge, please ensure the patient's follow-up has been scheduled.     If there is still no follow-up scheduled prior to discharge, please send an EPIC message to Matilda Aguillon in Infectious Diseases.    Clostridium difficile infection  First episode in December 2022 treated with 10 days of PO  vancomycin. Now with positive C diff PCR again and loose stools. Agree with PO vancomycin tapering regimen ordered by primary team. Patient educated that there is chance of relapsing diarrhea following completion of course at [facility].       Thank you for your consult. I will sign off. Please contact us if you have any additional questions.    Jeyson Gallego, DO  Infectious Disease  [facility]    Subjective:     Principal Problem:Arteriovenous graft infection    HPI: Ms. Bloom is a 57 y.o. F PMHx of ESRD on HD (LUE AVF), RCC (s/p L nephrectomy in 2016), HFrEF (EF 25%) s/p AICD, Afib on Eliquis presenting to Vascular clinic with redness and swelling at her old LUE AV graft that was replaced with a newer LUE graft in mid-December 2022. With concerns for a graft infection patient was prescribed Keflex, admitted, older graft was excised on 01/25, OR cultures collected, and newer LUE graft was left intact as it did not apper infected and didn't communicate with old graft. In addition, a L IJ tunneled cath was placed. ID consulted for duration of antibiotics course in patient with AVG infection.     Interval History: Patient afebrile with no leukocytosis. AV graft growing Staph epidermidis sensitive to penicillins. Will plan for 6 week course with HD.     Review of Systems   Constitutional:  Negative for activity change, appetite change and fever.   Respiratory:  Positive for cough. Negative for shortness of breath and wheezing.    Cardiovascular:  Negative for chest pain and leg swelling.   Gastrointestinal:  Positive for diarrhea. Negative for abdominal pain, constipation, nausea and vomiting.   Musculoskeletal:  Negative for myalgias.   Skin:  Negative for rash.   Neurological:  Negative for headaches.   Objective:     Vital Signs (Most Recent):  Temp: 97.7 °F (36.5 °C) (01/28/23 0725)  Pulse: 69 (01/28/23 1000)  Resp: 16 (01/28/23 0725)  BP: 127/65 (01/28/23 1000)  SpO2: (!) 93 % (01/28/23 0426)    Vital Signs (24h Range):  Temp:  [96.4 °F (35.8 °C)-98 °F (36.7 °C)] 97.7 °F (36.5 °C)  Pulse:  [63-86] 69  Resp:  [16-20] 16  SpO2:  [93 %-98 %] 93 %  BP: ()/(56-78) 127/65     Weight: 44.9 kg (99 lb)  Body mass index is 16.99 kg/m².    Estimated Creatinine Clearance: 9.1 mL/min (A) (based on SCr of 4.8 mg/dL (H)).    Physical Exam  Constitutional:       Appearance: Normal appearance.   Cardiovascular:      Rate and Rhythm: Normal rate and regular rhythm.      Pulses: Normal pulses.      Comments: L chest tunneled cath in place  Pulmonary:      Effort: Pulmonary effort is normal. No respiratory distress.      Breath sounds: Normal breath sounds. No wheezing or rales.   Abdominal:      Palpations: Abdomen is soft.      Tenderness: There is no abdominal tenderness. There is no guarding.   Musculoskeletal:         General: Tenderness (LUE fistula site with well secured wound vac) present.      Cervical back: Normal range of motion.      Right lower leg: No edema.      Left lower leg: No edema.   Neurological:      Mental Status: She is alert.       Significant Labs: All pertinent labs within the past 24 hours have been reviewed.    Significant Imaging: I have reviewed all pertinent imaging results/findings within the past 24 hours.

## 2023-01-28 NOTE — PROCEDURES
[facility] NOTE     Patient currently on hemodialysis for removal of uremic toxins and volume.     Patient seen and evaluated on hemodialysis, tolerating treatment, see HD flowsheet for vitals and assessments.      Ultrafiltration goal is 1L     Labs have been reviewed and the dialysate bath has been adjusted.     Assessment/Plan:  Seen on HD this morning, tolerating well.  Waiting on Woundvac for discharge  Phos WNL, not on phos binders  Will dose epo with next HD session if she remains in-house    ROBERT Sheridan, FNP-BC  Nephrology  Pager:  [phone]

## 2023-01-28 NOTE — PROGRESS NOTES
Pharmacokinetic Assessment Follow Up: IV Vancomycin    Vancomycin serum concentration assessment(s):    -Vancomycin random level 12.2 drawn about 2 hours after dialysis  -Goal 15-20 mcg/mL AV graft infection, Staph spp.  -ESRD on iHD, completed HD this AM around 1100    Vancomycin Regimen Plan:    - Redose vancomycin 500 mg  -VR tuesday, after HD.    Drug levels (last 3 results):  Recent Labs   Lab Result Units 01/26/23  0340 01/27/23  1145 01/28/23  1341   Vancomycin, Random ug/mL 13.9 17.7 12.2         Pharmacy will continue to follow and monitor vancomycin.    Please contact pharmacy at extension [phone] for questions regarding this assessment.    Thank you for the consult,   Janett Cummins       Patient brief summary:  Eva Bloom is a 57 y.o. female initiated on antimicrobial therapy with IV Vancomycin for treatment of skin & soft tissue infection    Drug Allergies:   Review of patient's allergies indicates:   Allergen Reactions    Coreg [carvedilol] Other (See Comments)     Nausea/vomiting    Allopurinol      Other reaction(s): abnormal transaminases       Actual Body Weight:   44.9 kg    Renal Function:   Estimated Creatinine Clearance: 9.1 mL/min (A) (based on SCr of 4.8 mg/dL (H)).,     Dialysis Method (if applicable):  N/A    CBC (last 72 hours):  Recent Labs   Lab Result Units 01/27/23  1145   WBC K/uL 5.90   Hemoglobin g/dL 9.4*   Hematocrit % 31.9*   Platelets K/uL 167   Gran % % 72.8   Lymph % % 14.7*   Mono % % 4.6   Eosinophil % % 7.1   Basophil % % 0.5   Differential Method  Automated         Metabolic Panel (last 72 hours):  Recent Labs   Lab Result Units 01/26/23  0810 01/27/23  1145 01/28/23  0746   Sodium mmol/L 139 137 137   Potassium mmol/L 3.9 3.5 3.3*   Chloride mmol/L 100 105 106   CO2 mmol/L 24 22* 18*   Glucose mg/dL 119* 86 114*   BUN mg/dL 51* 34* 36*   Creatinine mg/dL 5.9* 4.1* 4.8*   Albumin g/dL 1.7*  --  1.7*   Magnesium mg/dL  --  2.1  --    Phosphorus mg/dL 5.2* 3.7 4.5          Vancomycin Administrations:  vancomycin given in the last 96 hours                     vancomycin (VANCOCIN) 500 mg in dextrose 5 % in water (D5W) 5 % 100 mL IVPB (MB+) (mg) 500 mg New Bag 01/26/23 1300    vancomycin (VANCOCIN) injection 1 g (g) 1 g Given 01/25/23 0832                    Microbiologic Results:  Microbiology Results (last 7 days)       Procedure Component Value Units Date/Time    Aerobic culture [[id number]]  (Abnormal)  (Susceptibility) Collected: 01/25/23 0830    Order Status: Completed Specimen: Incision site from Arm, Left Updated: 01/28/23 1041     Aerobic Bacterial Culture STAPHYLOCOCCUS EPIDERMIDIS  Moderate      Narrative:      1. Left arm AV Graft    C Diff Toxin by PCR [[id number]]  (Abnormal) Collected: 01/27/23 1000    Order Status: Completed Updated: 01/28/23 0421     C. diff PCR Positive    Clostridium difficile EIA [[id number]]  (Abnormal) Collected: 01/27/23 1000    Order Status: Completed Specimen: Stool Updated: 01/28/23 0126     C. diff Antigen Positive     C difficile Toxins A+B, EIA Negative     Comment: Testing not recommended for children <24 months old.       Culture, Anaerobe [[id number]] Collected: 01/25/23 0830    Order Status: Completed Specimen: Incision site from Arm, Left Updated: 01/27/23 1324     Anaerobic Culture Culture in progress    Narrative:      1. Left Arm AV Graft    Blood culture [[id number]]     Order Status: No result Specimen: Blood     Blood culture [[id number]]     Order Status: No result Specimen: Blood     Gram stain [[id number]] Collected: 01/25/23 0830    Order Status: Completed Specimen: Incision site from Arm, Left Updated: 01/25/23 1404     Gram Stain Result Rare WBC's      No organisms seen    Narrative:      1. Left arm AV graft

## 2023-01-28 NOTE — PROGRESS NOTES
Pt arrived to unit AAOx4 via w/c. HD initiated via [facility] CVC, tolerated well, flows good. Blood obtained for RFP ,  called for . UF goal 1 L as tolerated

## 2023-01-29 LAB
ANION GAP SERPL CALC-SCNC: 12 MMOL/L (ref 8–16)
BASOPHILS # BLD AUTO: 0.06 K/UL (ref 0–0.2)
BASOPHILS NFR BLD: 1.2 % (ref 0–1.9)
BUN SERPL-MCNC: 30 MG/DL (ref 6–20)
CALCIUM SERPL-MCNC: 8.1 MG/DL (ref 8.7–10.5)
CHLORIDE SERPL-SCNC: 107 MMOL/L (ref 95–110)
CO2 SERPL-SCNC: 20 MMOL/L (ref 23–29)
CREAT SERPL-MCNC: 4 MG/DL (ref 0.5–1.4)
DIFFERENTIAL METHOD: ABNORMAL
EOSINOPHIL # BLD AUTO: 0.3 K/UL (ref 0–0.5)
EOSINOPHIL NFR BLD: 6.2 % (ref 0–8)
ERYTHROCYTE [DISTWIDTH] IN BLOOD BY AUTOMATED COUNT: 22.3 % (ref 11.5–14.5)
EST. GFR  (NO RACE VARIABLE): 12.4 ML/MIN/1.73 M^2
GLUCOSE SERPL-MCNC: 88 MG/DL (ref 70–110)
HCT VFR BLD AUTO: 33.3 % (ref 37–48.5)
HGB BLD-MCNC: 9.7 G/DL (ref 12–16)
IMM GRANULOCYTES # BLD AUTO: 0.03 K/UL (ref 0–0.04)
IMM GRANULOCYTES NFR BLD AUTO: 0.6 % (ref 0–0.5)
LYMPHOCYTES # BLD AUTO: 1.1 K/UL (ref 1–4.8)
LYMPHOCYTES NFR BLD: 21.9 % (ref 18–48)
MAGNESIUM SERPL-MCNC: 2.1 MG/DL (ref 1.6–2.6)
MCH RBC QN AUTO: 29.8 PG (ref 27–31)
MCHC RBC AUTO-ENTMCNC: 29.1 G/DL (ref 32–36)
MCV RBC AUTO: 102 FL (ref 82–98)
MONOCYTES # BLD AUTO: 0.3 K/UL (ref 0.3–1)
MONOCYTES NFR BLD: 6.2 % (ref 4–15)
NEUTROPHILS # BLD AUTO: 3.3 K/UL (ref 1.8–7.7)
NEUTROPHILS NFR BLD: 63.9 % (ref 38–73)
NRBC BLD-RTO: 1 /100 WBC
PHOSPHATE SERPL-MCNC: 3.8 MG/DL (ref 2.7–4.5)
PLATELET # BLD AUTO: 157 K/UL (ref 150–450)
PMV BLD AUTO: 11.6 FL (ref 9.2–12.9)
POTASSIUM SERPL-SCNC: 3.4 MMOL/L (ref 3.5–5.1)
RBC # BLD AUTO: 3.26 M/UL (ref 4–5.4)
SODIUM SERPL-SCNC: 139 MMOL/L (ref 136–145)
WBC # BLD AUTO: 5.16 K/UL (ref 3.9–12.7)

## 2023-01-29 PROCEDURE — 20600001 HC STEP DOWN PRIVATE ROOM

## 2023-01-29 PROCEDURE — 25000003 PHARM REV CODE 250: Performed by: STUDENT IN AN ORGANIZED HEALTH CARE EDUCATION/TRAINING PROGRAM

## 2023-01-29 PROCEDURE — 83735 ASSAY OF MAGNESIUM: CPT | Performed by: STUDENT IN AN ORGANIZED HEALTH CARE EDUCATION/TRAINING PROGRAM

## 2023-01-29 PROCEDURE — 27000207 HC ISOLATION

## 2023-01-29 PROCEDURE — 80048 BASIC METABOLIC PNL TOTAL CA: CPT | Performed by: STUDENT IN AN ORGANIZED HEALTH CARE EDUCATION/TRAINING PROGRAM

## 2023-01-29 PROCEDURE — 85025 COMPLETE CBC W/AUTO DIFF WBC: CPT | Performed by: STUDENT IN AN ORGANIZED HEALTH CARE EDUCATION/TRAINING PROGRAM

## 2023-01-29 PROCEDURE — 25000003 PHARM REV CODE 250: Performed by: SURGERY

## 2023-01-29 PROCEDURE — 36415 COLL VENOUS BLD VENIPUNCTURE: CPT | Performed by: STUDENT IN AN ORGANIZED HEALTH CARE EDUCATION/TRAINING PROGRAM

## 2023-01-29 PROCEDURE — 84100 ASSAY OF PHOSPHORUS: CPT | Performed by: STUDENT IN AN ORGANIZED HEALTH CARE EDUCATION/TRAINING PROGRAM

## 2023-01-29 RX ADMIN — HYDRALAZINE HYDROCHLORIDE 100 MG: 50 TABLET ORAL at 08:01

## 2023-01-29 RX ADMIN — MUPIROCIN: 20 OINTMENT TOPICAL at 08:01

## 2023-01-29 RX ADMIN — VANCOMYCIN HYDROCHLORIDE 125 MG: KIT at 05:01

## 2023-01-29 RX ADMIN — AMLODIPINE BESYLATE 5 MG: 5 TABLET ORAL at 08:01

## 2023-01-29 RX ADMIN — ASPIRIN 81 MG: 81 TABLET, COATED ORAL at 08:01

## 2023-01-29 RX ADMIN — VANCOMYCIN HYDROCHLORIDE 125 MG: KIT at 12:01

## 2023-01-29 RX ADMIN — VANCOMYCIN HYDROCHLORIDE 125 MG: KIT at 11:01

## 2023-01-29 RX ADMIN — METOPROLOL TARTRATE 50 MG: 50 TABLET, FILM COATED ORAL at 08:01

## 2023-01-29 RX ADMIN — ATORVASTATIN CALCIUM 40 MG: 40 TABLET, FILM COATED ORAL at 08:01

## 2023-01-29 NOTE — ASSESSMENT & PLAN NOTE
57yM w/ PMH HTN, HFrEF, RCC s/p nephrectomy, and ESRD on HD with infected LUE AVG now s/p excision of AVG 1/25.    - ASA, statin  - Amlodipine, hydralazine, and metoprolol  - PRN pain meds  - Nephrology following for dialysis  - Wound vac placed Friday  Infectious disease recs as following:  - Blood cultures  - Continue broad spectrum coverage with Vancomycin  - DC Zosyn    Dispo: Appreciate SW assistance for home wound vac, coordinate antibiotics with dialysis center

## 2023-01-29 NOTE — PLAN OF CARE
END OF SHIFT NOTE  Pt rested well throughout shift, no distress at this time, no complaints, VSS, bed in lowest position,  remain at bedside,  side rails up x2.  personal items within reach. Call light within reach, pt voiced concern about ordered IV fluids bc shes on HD,       RICKEY Agee RN         Problem: Infection  Goal: Absence of Infection Signs and Symptoms  Outcome: Ongoing, Progressing     Problem: Adult Inpatient Plan of Care  Goal: Plan of Care Review  Outcome: Ongoing, Progressing  Goal: Patient-Specific Goal (Individualized)  Outcome: Ongoing, Progressing  Goal: Absence of Hospital-Acquired Illness or Injury  Outcome: Ongoing, Progressing  Goal: Optimal Comfort and Wellbeing  Outcome: Ongoing, Progressing  Goal: Readiness for Transition of Care  Outcome: Ongoing, Progressing     Problem: Impaired Wound Healing  Goal: Optimal Wound Healing  Outcome: Ongoing, Progressing     Problem: Device-Related Complication Risk (Hemodialysis)  Goal: Safe, Effective Therapy Delivery  Outcome: Ongoing, Progressing     Problem: Hemodynamic Instability (Hemodialysis)  Goal: Effective Tissue Perfusion  Outcome: Ongoing, Progressing     Problem: Infection (Hemodialysis)  Goal: Absence of Infection Signs and Symptoms  Outcome: Ongoing, Progressing     Problem: Fall Injury Risk  Goal: Absence of Fall and Fall-Related Injury  Outcome: Ongoing, Progressing     Problem: Electrolyte Imbalance (Chronic Kidney Disease)  Goal: Electrolyte Balance  Outcome: Ongoing, Progressing      Spine appears normal, range of motion is not limited, no muscle or joint tenderness

## 2023-01-29 NOTE — PLAN OF CARE
Problem: Infection  Goal: Absence of Infection Signs and Symptoms  Outcome: Ongoing, Progressing     Problem: Adult Inpatient Plan of Care  Goal: Plan of Care Review  Outcome: Ongoing, Progressing  Goal: Patient-Specific Goal (Individualized)  Outcome: Ongoing, Progressing  Goal: Absence of Hospital-Acquired Illness or Injury  Outcome: Ongoing, Progressing  Goal: Optimal Comfort and Wellbeing  Outcome: Ongoing, Progressing  Goal: Readiness for Transition of Care  Outcome: Ongoing, Progressing     Problem: Impaired Wound Healing  Goal: Optimal Wound Healing  Outcome: Ongoing, Progressing     Problem: Device-Related Complication Risk (Hemodialysis)  Goal: Safe, Effective Therapy Delivery  Outcome: Ongoing, Progressing     Problem: Fall Injury Risk  Goal: Absence of Fall and Fall-Related Injury  Outcome: Ongoing, Progressing     Problem: Electrolyte Imbalance (Chronic Kidney Disease)  Goal: Electrolyte Balance  Outcome: Ongoing, Progressing

## 2023-01-29 NOTE — PROGRESS NOTES
[facility]  Vascular Surgery  Progress Note    Patient Name: Eva Bloom  MRN: [id number]  Admission Date: 1/25/2023  Primary Care Provider: Sowmya Mccain MD    Subjective:     Interval History:   NAEON   AFVSS  Wound vac in place to suction    Post-Op Info:  Procedure(s) (LRB):  REMOVAL, GRAFT, ARTERIOVENOUS, UPPER EXTREMITY (Left)  INSERTION, DUAL LUMEN CATHETER WITH PORT, WITH IMAGING GUIDANCE (N/A)   4 Days Post-Op       Medications:  Continuous Infusions:      Scheduled Meds:   amLODIPine  5 mg Oral Daily    aspirin  81 mg Oral Daily    atorvastatin  40 mg Oral Daily    ceFAZolin (ANCEF) IVPB  3 g Intravenous Q7 Days    And    [START ON 1/31/2023] ceFAZolin (ANCEF) IVPB  2 g Intravenous Every Tues, Thurs    hydrALAZINE  100 mg Oral Q12H    metoprolol tartrate  50 mg Oral BID    mupirocin   Nasal BID    vancomycin  125 mg Oral Q6H    Followed by    [START ON 2/7/2023] vancomycin  125 mg Oral Q12H    Followed by    [START ON 2/14/2023] vancomycin  125 mg Oral Daily    Followed by    [START ON 2/21/2023] vancomycin  125 mg Oral Q3 Days     PRN Meds:dextromethorphan-guaiFENesin  mg/5 ml, heparin (porcine), HYDROcodone-acetaminophen, oxyCODONE     Objective:     Vital Signs (Most Recent):  Temp: 97.7 °F (36.5 °C) (01/29/23 0730)  Pulse: 79 (01/29/23 0730)  Resp: 18 (01/29/23 0730)  BP: 137/65 (01/29/23 0730)  SpO2: 99 % (01/29/23 0730)   Vital Signs (24h Range):  Temp:  [97.4 °F (36.3 °C)-97.9 °F (36.6 °C)] 97.7 °F (36.5 °C)  Pulse:  [66-81] 79  Resp:  [17-18] 18  SpO2:  [95 %-99 %] 99 %  BP: ()/(43-85) 137/65         Physical Exam  Constitutional:       General: She is not in acute distress.     Appearance: Normal appearance.   HENT:      Head: Normocephalic and atraumatic.      Mouth/Throat:      Mouth: Mucous membranes are moist.   Eyes:      Extraocular Movements: Extraocular movements intact.      Conjunctiva/sclera: Conjunctivae normal.   Cardiovascular:      Rate and Rhythm:  Normal rate and regular rhythm.   Pulmonary:      Effort: Pulmonary effort is normal. No respiratory distress.   Abdominal:      General: There is no distension.      Palpations: Abdomen is soft.   Musculoskeletal:         General: Normal range of motion.      Comments: LUE graft excision site with WTD dressing in place, no purulent drainage   Skin:     General: Skin is warm and dry.      Capillary Refill: Capillary refill takes less than 2 seconds.      Comments: LUE graft excision site with wound vac in place, minimal drainage   Neurological:      General: No focal deficit present.      Mental Status: She is alert and oriented to person, place, and time. Mental status is at baseline.   Psychiatric:         Mood and Affect: Mood normal.       Significant Labs:  CBC:   Recent Labs   Lab 01/27/23  1145   WBC 5.90   RBC 3.13*   HGB 9.4*   HCT 31.9*      *   MCH 30.0   MCHC 29.5*       CMP:   Recent Labs   Lab 01/28/23  0746   *   CALCIUM 8.1*   ALBUMIN 1.7*      K 3.3*   CO2 18*      BUN 36*   CREATININE 4.8*         Significant Diagnostics:  I have reviewed all pertinent imaging results/findings within the past 24 hours.    Assessment/Plan:     * Arteriovenous graft infection  57yM w/ PMH HTN, HFrEF, RCC s/p nephrectomy, and ESRD on HD with infected LUE AVG now s/p excision of AVG 1/25.    - ASA, statin  - Amlodipine, hydralazine, and metoprolol  - PRN pain meds  - Nephrology following for dialysis  - Wound vac placed Friday  Infectious disease recs as following:  - Blood cultures  - Continue broad spectrum coverage with Vancomycin  - DC Zosyn    Dispo: Appreciate  assistance for home wound vac, coordinate antibiotics with dialysis center        Alison David MD  Vascular Surgery  [facility]

## 2023-01-29 NOTE — SUBJECTIVE & OBJECTIVE
Medications:  Continuous Infusions:      Scheduled Meds:   amLODIPine  5 mg Oral Daily    aspirin  81 mg Oral Daily    atorvastatin  40 mg Oral Daily    ceFAZolin (ANCEF) IVPB  3 g Intravenous Q7 Days    And    [START ON 1/31/2023] ceFAZolin (ANCEF) IVPB  2 g Intravenous Every Tues, Thurs    hydrALAZINE  100 mg Oral Q12H    metoprolol tartrate  50 mg Oral BID    mupirocin   Nasal BID    vancomycin  125 mg Oral Q6H    Followed by    [START ON 2/7/2023] vancomycin  125 mg Oral Q12H    Followed by    [START ON 2/14/2023] vancomycin  125 mg Oral Daily    Followed by    [START ON 2/21/2023] vancomycin  125 mg Oral Q3 Days     PRN Meds:dextromethorphan-guaiFENesin  mg/5 ml, heparin (porcine), HYDROcodone-acetaminophen, oxyCODONE     Objective:     Vital Signs (Most Recent):  Temp: 97.7 °F (36.5 °C) (01/29/23 0730)  Pulse: 79 (01/29/23 0730)  Resp: 18 (01/29/23 0730)  BP: 137/65 (01/29/23 0730)  SpO2: 99 % (01/29/23 0730)   Vital Signs (24h Range):  Temp:  [97.4 °F (36.3 °C)-97.9 °F (36.6 °C)] 97.7 °F (36.5 °C)  Pulse:  [66-81] 79  Resp:  [17-18] 18  SpO2:  [95 %-99 %] 99 %  BP: ()/(43-85) 137/65         Physical Exam  Constitutional:       General: She is not in acute distress.     Appearance: Normal appearance.   HENT:      Head: Normocephalic and atraumatic.      Mouth/Throat:      Mouth: Mucous membranes are moist.   Eyes:      Extraocular Movements: Extraocular movements intact.      Conjunctiva/sclera: Conjunctivae normal.   Cardiovascular:      Rate and Rhythm: Normal rate and regular rhythm.   Pulmonary:      Effort: Pulmonary effort is normal. No respiratory distress.   Abdominal:      General: There is no distension.      Palpations: Abdomen is soft.   Musculoskeletal:         General: Normal range of motion.      Comments: LUE graft excision site with WTD dressing in place, no purulent drainage   Skin:     General: Skin is warm and dry.      Capillary Refill: Capillary refill takes less than 2  seconds.      Comments: LUE graft excision site with wound vac in place, minimal drainage   Neurological:      General: No focal deficit present.      Mental Status: She is alert and oriented to person, place, and time. Mental status is at baseline.   Psychiatric:         Mood and Affect: Mood normal.       Significant Labs:  CBC:   Recent Labs   Lab 01/27/23  1145   WBC 5.90   RBC 3.13*   HGB 9.4*   HCT 31.9*      *   MCH 30.0   MCHC 29.5*       CMP:   Recent Labs   Lab 01/28/23  0746   *   CALCIUM 8.1*   ALBUMIN 1.7*      K 3.3*   CO2 18*      BUN 36*   CREATININE 4.8*         Significant Diagnostics:  I have reviewed all pertinent imaging results/findings within the past 24 hours.

## 2023-01-30 VITALS
BODY MASS INDEX: 16.9 KG/M2 | SYSTOLIC BLOOD PRESSURE: 136 MMHG | DIASTOLIC BLOOD PRESSURE: 64 MMHG | OXYGEN SATURATION: 96 % | RESPIRATION RATE: 18 BRPM | WEIGHT: 99 LBS | HEART RATE: 72 BPM | HEIGHT: 64 IN | TEMPERATURE: 97 F

## 2023-01-30 LAB
ANION GAP SERPL CALC-SCNC: 12 MMOL/L (ref 8–16)
BACTERIA SPEC ANAEROBE CULT: NORMAL
BASOPHILS # BLD AUTO: 0.04 K/UL (ref 0–0.2)
BASOPHILS NFR BLD: 0.7 % (ref 0–1.9)
BUN SERPL-MCNC: 45 MG/DL (ref 6–20)
CALCIUM SERPL-MCNC: 8.2 MG/DL (ref 8.7–10.5)
CHLORIDE SERPL-SCNC: 108 MMOL/L (ref 95–110)
CO2 SERPL-SCNC: 21 MMOL/L (ref 23–29)
CREAT SERPL-MCNC: 5.1 MG/DL (ref 0.5–1.4)
DIFFERENTIAL METHOD: ABNORMAL
EOSINOPHIL # BLD AUTO: 0.4 K/UL (ref 0–0.5)
EOSINOPHIL NFR BLD: 6.6 % (ref 0–8)
ERYTHROCYTE [DISTWIDTH] IN BLOOD BY AUTOMATED COUNT: 22.4 % (ref 11.5–14.5)
EST. GFR  (NO RACE VARIABLE): 9.2 ML/MIN/1.73 M^2
GLUCOSE SERPL-MCNC: 105 MG/DL (ref 70–110)
HCT VFR BLD AUTO: 31.8 % (ref 37–48.5)
HGB BLD-MCNC: 9.5 G/DL (ref 12–16)
IMM GRANULOCYTES # BLD AUTO: 0.03 K/UL (ref 0–0.04)
IMM GRANULOCYTES NFR BLD AUTO: 0.5 % (ref 0–0.5)
LYMPHOCYTES # BLD AUTO: 1 K/UL (ref 1–4.8)
LYMPHOCYTES NFR BLD: 16.9 % (ref 18–48)
MAGNESIUM SERPL-MCNC: 2.1 MG/DL (ref 1.6–2.6)
MCH RBC QN AUTO: 30.4 PG (ref 27–31)
MCHC RBC AUTO-ENTMCNC: 29.9 G/DL (ref 32–36)
MCV RBC AUTO: 102 FL (ref 82–98)
MONOCYTES # BLD AUTO: 0.5 K/UL (ref 0.3–1)
MONOCYTES NFR BLD: 8 % (ref 4–15)
NEUTROPHILS # BLD AUTO: 4.1 K/UL (ref 1.8–7.7)
NEUTROPHILS NFR BLD: 67.3 % (ref 38–73)
NRBC BLD-RTO: 0 /100 WBC
PHOSPHATE SERPL-MCNC: 4.4 MG/DL (ref 2.7–4.5)
PLATELET # BLD AUTO: 134 K/UL (ref 150–450)
PMV BLD AUTO: 12.4 FL (ref 9.2–12.9)
POTASSIUM SERPL-SCNC: 3.5 MMOL/L (ref 3.5–5.1)
RBC # BLD AUTO: 3.12 M/UL (ref 4–5.4)
SODIUM SERPL-SCNC: 141 MMOL/L (ref 136–145)
WBC # BLD AUTO: 6.02 K/UL (ref 3.9–12.7)

## 2023-01-30 PROCEDURE — 80048 BASIC METABOLIC PNL TOTAL CA: CPT | Performed by: STUDENT IN AN ORGANIZED HEALTH CARE EDUCATION/TRAINING PROGRAM

## 2023-01-30 PROCEDURE — 25000003 PHARM REV CODE 250: Performed by: STUDENT IN AN ORGANIZED HEALTH CARE EDUCATION/TRAINING PROGRAM

## 2023-01-30 PROCEDURE — 97162 PT EVAL MOD COMPLEX 30 MIN: CPT

## 2023-01-30 PROCEDURE — 97530 THERAPEUTIC ACTIVITIES: CPT

## 2023-01-30 PROCEDURE — 84100 ASSAY OF PHOSPHORUS: CPT | Performed by: STUDENT IN AN ORGANIZED HEALTH CARE EDUCATION/TRAINING PROGRAM

## 2023-01-30 PROCEDURE — 83735 ASSAY OF MAGNESIUM: CPT | Performed by: STUDENT IN AN ORGANIZED HEALTH CARE EDUCATION/TRAINING PROGRAM

## 2023-01-30 PROCEDURE — 85025 COMPLETE CBC W/AUTO DIFF WBC: CPT | Performed by: STUDENT IN AN ORGANIZED HEALTH CARE EDUCATION/TRAINING PROGRAM

## 2023-01-30 PROCEDURE — 36415 COLL VENOUS BLD VENIPUNCTURE: CPT | Performed by: STUDENT IN AN ORGANIZED HEALTH CARE EDUCATION/TRAINING PROGRAM

## 2023-01-30 RX ORDER — SODIUM CHLORIDE 9 MG/ML
INJECTION, SOLUTION INTRAVENOUS ONCE
Status: DISCONTINUED | OUTPATIENT
Start: 2023-01-31 | End: 2023-01-30 | Stop reason: HOSPADM

## 2023-01-30 RX ORDER — OXYCODONE HYDROCHLORIDE 5 MG/1
5 TABLET ORAL EVERY 4 HOURS PRN
Qty: 8 TABLET | Refills: 0 | OUTPATIENT
Start: 2023-01-30 | End: 2023-02-14

## 2023-01-30 RX ADMIN — ASPIRIN 81 MG: 81 TABLET, COATED ORAL at 08:01

## 2023-01-30 RX ADMIN — VANCOMYCIN HYDROCHLORIDE 125 MG: KIT at 06:01

## 2023-01-30 RX ADMIN — AMLODIPINE BESYLATE 5 MG: 5 TABLET ORAL at 08:01

## 2023-01-30 RX ADMIN — VANCOMYCIN HYDROCHLORIDE 125 MG: KIT at 12:01

## 2023-01-30 RX ADMIN — VANCOMYCIN HYDROCHLORIDE 125 MG: KIT at 11:01

## 2023-01-30 RX ADMIN — VANCOMYCIN HYDROCHLORIDE 125 MG: KIT at 05:01

## 2023-01-30 RX ADMIN — METOPROLOL TARTRATE 50 MG: 50 TABLET, FILM COATED ORAL at 08:01

## 2023-01-30 RX ADMIN — MUPIROCIN: 20 OINTMENT TOPICAL at 09:01

## 2023-01-30 RX ADMIN — HYDRALAZINE HYDROCHLORIDE 100 MG: 50 TABLET ORAL at 08:01

## 2023-01-30 RX ADMIN — ATORVASTATIN CALCIUM 40 MG: 40 TABLET, FILM COATED ORAL at 08:01

## 2023-01-30 NOTE — PLAN OF CARE
[facility] - [facility]      HOME HEALTH ORDERS  FACE TO FACE ENCOUNTER    Patient Name: Eva Bloom  YOB: 1965    PCP: Sowmya Mccain MD   PCP Address: [address] / [place] [address]  PCP Phone Number: [phone]  PCP Fax: [phone]    Encounter Date: 1/24/23    Admit to Home Health    Diagnoses:  Active Hospital Problems    Diagnosis  POA    *Arteriovenous graft infection [T82.7XXA]  Yes    Clostridium difficile infection [A49.8]  Yes    Anemia in ESRD (end-stage renal disease) [N18.6, D63.1]  Yes    Chronic kidney disease-mineral and bone disorder [N18.9, E83.9, M89.9]  Yes    ESRD (end stage renal disease) [N18.6]  Unknown     - Tuesday, Thursday, and Saturday  - Chelsie  - Dr. Hart, nephrology        Resolved Hospital Problems   No resolved problems to display.       Follow Up Appointments:  Future Appointments   Date Time Provider Department Center   2/7/2023  3:30 PM Gopal Huerta MD [facility] GASTRO [facility]   2/8/2023 10:00 AM Carlos Parker MD [facility] ENDOCRN [facility] [place] [facility]   2/16/2023  1:00 PM Yonathan Paz MD [facility] ID [facility]   2/20/2023 11:00 AM Kandis Brooks MD [facility] ENSRG [facility]   3/9/2023  9:00 AM Jeyson Gallego DO [facility] ID [facility]   3/15/2023  8:20 AM COORDINATED DEVICE CHECK [facility] ARRHPRO [facility]   3/15/2023  9:00 AM EKG, APPT [facility] EKG [facility]   3/15/2023 10:00 AM Leila Scott NP [facility] ARRHYTH [facility]   4/21/2023  8:30 AM Malik Burgos MD [facility] HNSO [facility]   5/5/2023  2:00 PM Mirna Larsen OD [facility] OPTOMTY [place]   5/12/2023  8:00 AM VASCULAR, LAB [facility] VASCLAB [facility]   5/12/2023  8:45 AM FARIDEH Muñoz III, MD [facility]       Allergies:  Review of patient's allergies indicates:   Allergen Reactions    Coreg [carvedilol] Other (See Comments)     Nausea/vomiting    Allopurinol      Other reaction(s): abnormal transaminases       Medications: Review discharge medications with patient and family and provide education.    Current  Facility-Administered Medications   Medication Dose Route Frequency Provider Last Rate Last Admin    [START ON 1/31/2023] 0.9%  NaCl infusion   Intravenous Once Jose Elias Brock NP        amLODIPine tablet 5 mg  5 mg Oral Daily Richard Bunn MD   5 mg at 01/30/23 0845    aspirin EC tablet 81 mg  81 mg Oral Daily Richard Bunn MD   81 mg at 01/30/23 0845    atorvastatin tablet 40 mg  40 mg Oral Daily Richard Bunn MD   40 mg at 01/30/23 0845    ceFAZolin (ANCEF) 3 g in dextrose 5 % (D5W) 100 mL IVPB  3 g Intravenous Q7 Days Jeyson Gallego DO   Stopped at 01/28/23 1748    And    [START ON 1/31/2023] ceFAZolin 2 g in dextrose 5 % in water (D5W) 5 % 50 mL IVPB (MB+)  2 g Intravenous Every Tues, Thurs Jeyson Gallego DO        dextromethorphan-guaiFENesin  mg/5 ml liquid 5 mL  5 mL Oral Q6H PRN Telma Patino MD   5 mL at 01/27/23 0035    heparin (porcine) injection 1,000 Units  1,000 Units Intra-Catheter PRN Edson Murdock MD   1,000 Units at 01/28/23 1041    hydrALAZINE tablet 100 mg  100 mg Oral Q12H Richard Bunn MD   100 mg at 01/30/23 0845    HYDROcodone-acetaminophen 5-325 mg per tablet 1 tablet  1 tablet Oral Q4H PRN Richard Bunn MD   1 tablet at 01/26/23 1935    metoprolol tartrate (LOPRESSOR) tablet 50 mg  50 mg Oral BID Richard Bunn MD   50 mg at 01/30/23 0846    mupirocin 2 % ointment   Nasal BID W GUSTAVO Muñoz III, MD   Given at 01/30/23 0900    oxyCODONE immediate release tablet Tab 10 mg  10 mg Oral Q6H PRN Corby Hernandez MD   10 mg at 01/26/23 1159    vancomycin 125 mg/5 mL oral solution 125 mg  125 mg Oral Q6H Brenden Celeste MD   125 mg at 01/30/23 0606    Followed by    [START ON 2/7/2023] vancomycin 125 mg/5 mL oral solution 125 mg  125 mg Oral Q12H Brenden Celeste MD        Followed by    [START ON 2/14/2023] vancomycin 125 mg/5 mL oral solution 125 mg  125 mg Oral Daily Brenden Celeste MD        Followed by    [START ON 2/21/2023] vancomycin  125 mg/5 mL oral solution 125 mg  125 mg Oral Q3 Days Brenden Celeste MD         Current Discharge Medication List        CONTINUE these medications which have NOT CHANGED    Details   apixaban (ELIQUIS) 2.5 mg Tab Take 1 tablet (2.5 mg total) by mouth 2 (two) times daily.  Qty: 60 tablet, Refills: 11    Associated Diagnoses: PAF (paroxysmal atrial fibrillation)      CABOMETYX 60 mg Tab TAKE ONE TABLET BY MOUTH ONCE DAILY AT THE SAME TIME ON AN EMPTY STOMACH AT LEAST 1 HOUR BEFORE OR 2 HOURS AFTER EATING. AVOID GRAPEFRUIT PRODUCTS  Qty: 30 tablet, Refills: 4    Associated Diagnoses: Metastatic renal cell carcinoma, unspecified laterality      cloNIDine 0.3 mg/24 hr td ptwk (CATAPRES) 0.3 mg/24 hr Place 1 patch onto the skin every 7 days.  Qty: 4 patch, Refills: 11    Comments: .  Associated Diagnoses: Essential hypertension      hydrALAZINE (APRESOLINE) 100 MG tablet Take 1 tablet (100 mg total) by mouth every 12 (twelve) hours.  Qty: 180 tablet, Refills: 2      metoprolol succinate (TOPROL-XL) 100 MG 24 hr tablet Take 1/2 tablet (50mg) once daily  Qty: 90 tablet, Refills: 3    Comments: .  Associated Diagnoses: Hypertensive CKD, ESRD on dialysis      mv,Ca,min-folic acid-vit K1 (ONE-A-DAY WOMEN'S 50 PLUS) 400-20 mcg Tab Take 1 tablet by mouth once daily.      sacubitriL-valsartan (ENTRESTO) 49-51 mg per tablet Take 1 tablet by mouth 2 (two) times daily.  Qty: 60 tablet, Refills: 11    Associated Diagnoses: Cardiomyopathy, nonischemic; Combined systolic and diastolic cardiac dysfunction      acetaminophen (TYLENOL) 500 MG tablet Take 500 mg by mouth every 6 (six) hours as needed for Pain.      epoetin david-epbx (RETACRIT INJ) Epoetin david - epbx (Retacrit)      gabapentin (NEURONTIN) 100 MG capsule Take 1 capsule (100 mg total) by mouth once daily.  Qty: 30 capsule, Refills: 11    Associated Diagnoses: Burning sensation of mouth      HYDROcodone-acetaminophen (NORCO) 5-325 mg per tablet Take 1 tablet by mouth every 4  (four) hours as needed for Pain. Causes drowsiness. Do not drive or operate machinery with this medication. Do not drink alcohol with this medication. Causes constipation. Take with stool softener.  Qty: 10 tablet, Refills: 0      lidocaine-prilocaine (EMLA) cream APPLY ATLEAST 30 MINUTES BEFORE TREATMENT 3 TIMES A WEEK  Qty: 30 g, Refills: 11    Associated Diagnoses: Hypertensive CKD, ESRD on dialysis      promethazine (PHENERGAN) 25 MG tablet Take 1 tablet (25 mg total) by mouth every 6 (six) hours as needed for Nausea.  Qty: 15 tablet, Refills: 0      sodium chloride 0.9% SolP 100 mL with iron sucrose 100 mg iron/5 mL Soln 100 mg 50 mg every Tues, Thurs, Sat.           STOP taking these medications       cephALEXin (KEFLEX) 500 MG capsule Comments:   Reason for Stopping:                 I have seen and examined this patient within the last 30 days. My clinical findings that support the need for the home health skilled services and home bound status are the following:no   Weakness/numbness causing balance and gait disturbance due to Surgery making it taxing to leave home.     Diet:   renal diet    Labs:  SN to perform labs:  CBC: Weekly; 6week(s) and BMP: weekly; 6 week(s)    Referrals/ Consults      Activities:   activity as tolerated    Nursing:   Agency to admit patient within 24 hours of hospital discharge unless specified on physician order or at patient request    SN to complete comprehensive assessment including routine vital signs. Instruct on disease process and s/s of complications to report to MD. Review/verify medication list sent home with the patient at time of discharge  and instruct patient/caregiver as needed. Frequency may be adjusted depending on start of care date.     Skilled nurse to perform up to 3 visits PRN for symptoms related to diagnosis    Notify MD if SBP > 160 or < 90; DBP > 90 or < 50; HR > 120 or < 50; Temp > 101; O2 < 88%; Other:       Ok to schedule additional visits based on  staff availability and patient request on consecutive days within the home health episode.    When multiple disciplines ordered:    Start of Care occurs on Sunday - Wednesday schedule remaining discipline evaluations as ordered on separate consecutive days following the start of care.    Thursday SOC -schedule subsequent evaluations Friday and Monday the following week.     Friday - Saturday SOC - schedule subsequent discipline evaluations on consecutive days starting Monday of the following week.    For all post-discharge communication and subsequent orders please contact patient's primary care physician. If unable to reach primary care physician or do not receive response within 30 minutes, please contact [phone] for clinical staff order clarification    Miscellaneous         Outpatient Antibiotic Therapy Plan:     Please send referral to [facility].     1) Infection: AV graft infected with Staph epidermidis      2) Discharge Antibiotics:     Intravenous antibiotics:  IV cefazolin 2 g, 2 g, 3 g (corresponding to HD days)       3) Therapy Duration:  6 weeks      Estimated end date of IV antibiotics: 3/8/23     4) Outpatient Weekly Labs:     Order the following labs to be drawn on Mondays:   CBC  CMP      5) Fax Lab Results to Infectious Diseases Provider: Dr Gallego      [facility] Fax Number: [phone]     6) Outpatient Infectious Diseases Follow-up     Follow-up appointment will be arranged by the ID clinic and will be found in the patient's appointments tab.     Prior to discharge, please ensure the patient's follow-up has been scheduled.    If there is still no follow-up scheduled prior to discharge, please send an EPIC message to Matilda Aguillon in Infectious Diseases.           Home Health Aide:  Three times weekly         Wound Care Orders  yes:  Wound Vac:   Location:  LUE         Dressing changes every Monday, Wednesday and Friday.        ET Consult      I  certify that this patient is confined to her home and needs intermittent skilled nursing care.

## 2023-01-30 NOTE — PROGRESS NOTES
[facility] Arias - [facility]  Vascular Surgery  Progress Note    Patient Name: Eva Bloom  MRN: [id number]  Admission Date: 1/25/2023  Primary Care Provider: Sowmya Mccain MD    Subjective:     Interval History: LUZMARIA ABDI.  Patient sitting up during rounds, discussed plan of care, will change wound vac prior to DC.  Pending home vac delivery.     Post-Op Info:  Procedure(s) (LRB):  REMOVAL, GRAFT, ARTERIOVENOUS, UPPER EXTREMITY (Left)  INSERTION, DUAL LUMEN CATHETER WITH PORT, WITH IMAGING GUIDANCE (N/A)   5 Days Post-Op       Medications:  Continuous Infusions:  Scheduled Meds:   amLODIPine  5 mg Oral Daily    aspirin  81 mg Oral Daily    atorvastatin  40 mg Oral Daily    ceFAZolin (ANCEF) IVPB  3 g Intravenous Q7 Days    And    [START ON 1/31/2023] ceFAZolin (ANCEF) IVPB  2 g Intravenous Every Tues, Thurs    hydrALAZINE  100 mg Oral Q12H    metoprolol tartrate  50 mg Oral BID    mupirocin   Nasal BID    vancomycin  125 mg Oral Q6H    Followed by    [START ON 2/7/2023] vancomycin  125 mg Oral Q12H    Followed by    [START ON 2/14/2023] vancomycin  125 mg Oral Daily    Followed by    [START ON 2/21/2023] vancomycin  125 mg Oral Q3 Days     PRN Meds:dextromethorphan-guaiFENesin  mg/5 ml, heparin (porcine), HYDROcodone-acetaminophen, oxyCODONE     Objective:     Vital Signs (Most Recent):  Temp: 97.9 °F (36.6 °C) (01/30/23 0702)  Pulse: 80 (01/30/23 0702)  Resp: 18 (01/30/23 0702)  BP: (!) 145/70 (01/30/23 0702)  SpO2: 99 % (01/30/23 0702)   Vital Signs (24h Range):  Temp:  [96.2 °F (35.7 °C)-98.9 °F (37.2 °C)] 97.9 °F (36.6 °C)  Pulse:  [70-86] 80  Resp:  [16-18] 18  SpO2:  [92 %-99 %] 99 %  BP: (118-153)/(58-96) 145/70         Physical Exam  Constitutional:       General: She is not in acute distress.     Appearance: Normal appearance.   HENT:      Head: Normocephalic and atraumatic.      Mouth/Throat:      Mouth: Mucous membranes are moist.   Eyes:      Extraocular Movements: Extraocular movements  intact.      Conjunctiva/sclera: Conjunctivae normal.   Cardiovascular:      Rate and Rhythm: Normal rate and regular rhythm.   Pulmonary:      Effort: Pulmonary effort is normal. No respiratory distress.   Abdominal:      General: There is no distension.      Palpations: Abdomen is soft.   Musculoskeletal:         General: Normal range of motion.      Comments: LUE graft excision site with WTD dressing in place   Skin:     General: Skin is warm and dry.      Capillary Refill: Capillary refill takes less than 2 seconds.      Comments: LUE graft excision site with wound vac in place, minimal drainage   Neurological:      General: No focal deficit present.      Mental Status: She is alert and oriented to person, place, and time. Mental status is at baseline.   Psychiatric:         Mood and Affect: Mood normal.       Significant Labs:  CBC:   Recent Labs   Lab 01/30/23 0319   WBC 6.02   RBC 3.12*   HGB 9.5*   HCT 31.8*   *   *   MCH 30.4   MCHC 29.9*     CMP:   Recent Labs   Lab 01/28/23  0746 01/29/23  0748 01/30/23 0319   *   < > 105   CALCIUM 8.1*   < > 8.2*   ALBUMIN 1.7*  --   --       < > 141   K 3.3*   < > 3.5   CO2 18*   < > 21*      < > 108   BUN 36*   < > 45*   CREATININE 4.8*   < > 5.1*    < > = values in this interval not displayed.       Significant Diagnostics:  I have reviewed all pertinent imaging results/findings within the past 24 hours.    Assessment/Plan:     * Arteriovenous graft infection  57yM w/ PMH HTN, HFrEF, RCC s/p nephrectomy, and ESRD on HD with infected LUE AVG now s/p excision of AVG 1/25.    - ASA, statin  - Amlodipine, hydralazine, and metoprolol  - PRN pain meds  - Nephrology following for dialysis  - Wound vac placed Friday        Infectious disease recs as following:  Outpatient Antibiotic Therapy Plan:     Please send referral to [facility].     1) Infection: AV graft infected with Staph epidermidis      2)  Discharge Antibiotics:     Intravenous antibiotics:   IV cefazolin 2 g, 2 g, 3 g (corresponding to HD days)       3) Therapy Duration:  6 weeks      Estimated end date of IV antibiotics: 3/8/23     4) Outpatient Weekly Labs:     Order the following labs to be drawn on Mondays:    CBC   CMP      5) Fax Lab Results to Infectious Diseases Provider: Dr Gallego      [facility] Fax Number: [phone]     6) Outpatient Infectious Diseases Follow-up      Follow-up appointment will be arranged by the ID clinic and will be found in the patient's appointments tab.      Prior to discharge, please ensure the patient's follow-up has been scheduled.     If there is still no follow-up scheduled prior to discharge, please send an EPIC message to Matilda Aguillon in Infectious Diseases.           Dispo: Appreciate  assistance for home wound vac, coordinate antibiotics with dialysis center        Violette Robledo NP  Vascular Surgery  [facility]

## 2023-01-30 NOTE — SUBJECTIVE & OBJECTIVE
Medications:  Continuous Infusions:  Scheduled Meds:   amLODIPine  5 mg Oral Daily    aspirin  81 mg Oral Daily    atorvastatin  40 mg Oral Daily    ceFAZolin (ANCEF) IVPB  3 g Intravenous Q7 Days    And    [START ON 1/31/2023] ceFAZolin (ANCEF) IVPB  2 g Intravenous Every Tues, Thurs    hydrALAZINE  100 mg Oral Q12H    metoprolol tartrate  50 mg Oral BID    mupirocin   Nasal BID    vancomycin  125 mg Oral Q6H    Followed by    [START ON 2/7/2023] vancomycin  125 mg Oral Q12H    Followed by    [START ON 2/14/2023] vancomycin  125 mg Oral Daily    Followed by    [START ON 2/21/2023] vancomycin  125 mg Oral Q3 Days     PRN Meds:dextromethorphan-guaiFENesin  mg/5 ml, heparin (porcine), HYDROcodone-acetaminophen, oxyCODONE     Objective:     Vital Signs (Most Recent):  Temp: 97.9 °F (36.6 °C) (01/30/23 0702)  Pulse: 80 (01/30/23 0702)  Resp: 18 (01/30/23 0702)  BP: (!) 145/70 (01/30/23 0702)  SpO2: 99 % (01/30/23 0702)   Vital Signs (24h Range):  Temp:  [96.2 °F (35.7 °C)-98.9 °F (37.2 °C)] 97.9 °F (36.6 °C)  Pulse:  [70-86] 80  Resp:  [16-18] 18  SpO2:  [92 %-99 %] 99 %  BP: (118-153)/(58-96) 145/70         Physical Exam  Constitutional:       General: She is not in acute distress.     Appearance: Normal appearance.   HENT:      Head: Normocephalic and atraumatic.      Mouth/Throat:      Mouth: Mucous membranes are moist.   Eyes:      Extraocular Movements: Extraocular movements intact.      Conjunctiva/sclera: Conjunctivae normal.   Cardiovascular:      Rate and Rhythm: Normal rate and regular rhythm.   Pulmonary:      Effort: Pulmonary effort is normal. No respiratory distress.   Abdominal:      General: There is no distension.      Palpations: Abdomen is soft.   Musculoskeletal:         General: Normal range of motion.      Comments: LUE graft excision site with WTD dressing in place   Skin:     General: Skin is warm and dry.      Capillary Refill: Capillary refill takes less than 2 seconds.      Comments:  LUE graft excision site with wound vac in place, minimal drainage   Neurological:      General: No focal deficit present.      Mental Status: She is alert and oriented to person, place, and time. Mental status is at baseline.   Psychiatric:         Mood and Affect: Mood normal.       Significant Labs:  CBC:   Recent Labs   Lab 01/30/23  0319   WBC 6.02   RBC 3.12*   HGB 9.5*   HCT 31.8*   *   *   MCH 30.4   MCHC 29.9*     CMP:   Recent Labs   Lab 01/28/23  0746 01/29/23  0748 01/30/23  0319   *   < > 105   CALCIUM 8.1*   < > 8.2*   ALBUMIN 1.7*  --   --       < > 141   K 3.3*   < > 3.5   CO2 18*   < > 21*      < > 108   BUN 36*   < > 45*   CREATININE 4.8*   < > 5.1*    < > = values in this interval not displayed.       Significant Diagnostics:  I have reviewed all pertinent imaging results/findings within the past 24 hours.

## 2023-01-30 NOTE — ASSESSMENT & PLAN NOTE
57yM w/ PMH HTN, HFrEF, RCC s/p nephrectomy, and ESRD on HD with infected LUE AVG now s/p excision of AVG 1/25.    - ASA, statin  - Amlodipine, hydralazine, and metoprolol  - PRN pain meds  - Nephrology following for dialysis  - Wound vac placed Friday        Infectious disease recs as following:  Outpatient Antibiotic Therapy Plan:     Please send referral to [facility].     1) Infection: AV graft infected with Staph epidermidis      2) Discharge Antibiotics:     Intravenous antibiotics:   IV cefazolin 2 g, 2 g, 3 g (corresponding to HD days)       3) Therapy Duration:  6 weeks      Estimated end date of IV antibiotics: 3/8/23     4) Outpatient Weekly Labs:     Order the following labs to be drawn on Mondays:    CBC   CMP      5) Fax Lab Results to Infectious Diseases Provider: Dr Gallego      [facility] Fax Number: [phone]     6) Outpatient Infectious Diseases Follow-up      Follow-up appointment will be arranged by the ID clinic and will be found in the patient's appointments tab.      Prior to discharge, please ensure the patient's follow-up has been scheduled.     If there is still no follow-up scheduled prior to discharge, please send an EPIC message to Matilda Aguillon in Infectious Diseases.           Dispo: Appreciate SW assistance for home wound vac, coordinate antibiotics with dialysis center

## 2023-01-30 NOTE — PLAN OF CARE
Problem: Infection  Goal: Absence of Infection Signs and Symptoms  Outcome: Ongoing, Progressing     Problem: Adult Inpatient Plan of Care  Goal: Plan of Care Review  Outcome: Ongoing, Progressing  Goal: Patient-Specific Goal (Individualized)  Outcome: Ongoing, Progressing  Goal: Absence of Hospital-Acquired Illness or Injury  Outcome: Ongoing, Progressing  Goal: Optimal Comfort and Wellbeing  Outcome: Ongoing, Progressing  Goal: Readiness for Transition of Care  Outcome: Ongoing, Progressing     Problem: Impaired Wound Healing  Goal: Optimal Wound Healing  Outcome: Ongoing, Progressing     Problem: Device-Related Complication Risk (Hemodialysis)  Goal: Safe, Effective Therapy Delivery  Outcome: Ongoing, Progressing     Problem: Infection (Hemodialysis)  Goal: Absence of Infection Signs and Symptoms  Outcome: Ongoing, Progressing     Problem: Fall Injury Risk  Goal: Absence of Fall and Fall-Related Injury  Outcome: Ongoing, Progressing

## 2023-01-30 NOTE — PLAN OF CARE
END OF SHIFT NOTE  Pt rested well throughout shift, no distress at this time, no complaints,  remain at bedside, BMx1, pt reported that it is firm. VSS, wound vac remain in placed, intact and sealed, bed in lowest position, side rails up x2. Call light within reach, personal items within reach.       RICKEY Agee RN         Problem: Infection  Goal: Absence of Infection Signs and Symptoms  Outcome: Ongoing, Progressing     Problem: Adult Inpatient Plan of Care  Goal: Plan of Care Review  Outcome: Ongoing, Progressing  Goal: Patient-Specific Goal (Individualized)  Outcome: Ongoing, Progressing  Goal: Absence of Hospital-Acquired Illness or Injury  Outcome: Ongoing, Progressing  Goal: Optimal Comfort and Wellbeing  Outcome: Ongoing, Progressing  Goal: Readiness for Transition of Care  Outcome: Ongoing, Progressing     Problem: Impaired Wound Healing  Goal: Optimal Wound Healing  Outcome: Ongoing, Progressing     Problem: Device-Related Complication Risk (Hemodialysis)  Goal: Safe, Effective Therapy Delivery  Outcome: Ongoing, Progressing     Problem: Hemodynamic Instability (Hemodialysis)  Goal: Effective Tissue Perfusion  Outcome: Ongoing, Progressing     Problem: Infection (Hemodialysis)  Goal: Absence of Infection Signs and Symptoms  Outcome: Ongoing, Progressing     Problem: Fall Injury Risk  Goal: Absence of Fall and Fall-Related Injury  Outcome: Ongoing, Progressing     Problem: Electrolyte Imbalance (Chronic Kidney Disease)  Goal: Electrolyte Balance  Outcome: Ongoing, Progressing

## 2023-01-31 ENCOUNTER — PATIENT OUTREACH (OUTPATIENT)
Dept: ADMINISTRATIVE | Facility: CLINIC | Age: 58
End: 2023-01-31
Payer: COMMERCIAL

## 2023-01-31 DIAGNOSIS — N18.30 STAGE 3 CHRONIC KIDNEY DISEASE, UNSPECIFIED WHETHER STAGE 3A OR 3B CKD: Primary | ICD-10-CM

## 2023-01-31 DIAGNOSIS — T82.7XXA ARTERIOVENOUS GRAFT INFECTION, INITIAL ENCOUNTER: ICD-10-CM

## 2023-01-31 NOTE — PLAN OF CARE
Problem: Physical Therapy  Goal: Physical Therapy Goal  Description: Goals to be met by: 2022     Patient will increase functional independence with mobility by performin. Supine to sit with Gregg  2. Sit to supine with Gregg  3. Sit to stand transfer with Gregg  4. Bed to chair transfer with Gregg using No Assistive Device  5. Gait  x 200 feet with Gregg using No Assistive Device.     Outcome: Ongoing, Progressing

## 2023-01-31 NOTE — PT/OT/SLP EVAL
Physical Therapy Evaluation    Patient Name:  Eva Bloom   MRN:  [id number]    Recommendations:     Discharge Recommendations: home health PT   Discharge Equipment Recommendations: none   Barriers to discharge: None    Assessment:     Eva Bloom is a 57 y.o. female admitted with a medical diagnosis of Arteriovenous graft infection.  She presents with the following impairments/functional limitations: weakness, impaired endurance, impaired self care skills, impaired functional mobility, gait instability, impaired balance, decreased lower extremity function. Pt would benefit from HHPT for: Dynamic/static standing/sitting balance through skilled balance training, strengthening with the use of skilled therapeutic exercises interventions, and mobility through adaptive equipment training. Pt continues to benefit from a collaborative PT program to improve quality of life and focus on recovery of impairments.      Rehab Prognosis: Good; patient would benefit from acute skilled PT services to address these deficits and reach maximum level of function.    Recent Surgery: Procedure(s) (LRB):  REMOVAL, GRAFT, ARTERIOVENOUS, UPPER EXTREMITY (Left)  INSERTION, DUAL LUMEN CATHETER WITH PORT, WITH IMAGING GUIDANCE (N/A) 6 Days Post-Op    Plan:     During this hospitalization, patient to be seen 3 x/week to address the identified rehab impairments via gait training, therapeutic activities, therapeutic exercises, neuromuscular re-education and progress toward the following goals:    Plan of Care Expires:  03/01/23    Subjective     Chief Complaint: fatigue  Patient/Family Comments/goals: pt denies any concerns with Dc'ing home  Pain/Comfort:    No c/o pain     Patients cultural, spiritual, Evangelical conflicts given the current situation: no    Living Environment:  Pt lives with her  in a [facility] with 0E.  Prior to admission, patients level of function was independent for all functional mobility and ADLs.  Equipment  used at home: none.  DME owned (not currently used): none.  Upon discharge, patient will have assistance from spouse (he works nights).    Objective:     Communicated with RN prior to session.  Patient found HOB elevated with wound vac, central line, peripheral IV  upon PT entry to room.    General Precautions: Standard, fall  Orthopedic Precautions:N/A   Braces: N/A  Respiratory Status: Room air    Exams:  Cognitive Exam:  Patient is oriented to Person, Place, Time, and Situation  Gross Motor Coordination:  WFL  Postural Exam:  Patient presented with the following abnormalities:    -       Rounded shoulders  Sensation:  denies numbness/tingling, endorses normal light touch  RLE ROM: WFL  RLE Strength: Deficits: hip flexion 3/5, knee extension 4/5, knee flexion 3/5, DF 3/5  LLE ROM: WFL  LLE Strength: Deficits: hip flexion 3/5, knee extension 4/5, knee flexion 3/5, DF 3/5    Functional Mobility:  Bed Mobility:     Scooting: supervision  Supine to Sit: supervision  Sit to Supine: supervision  Transfers:     Sit to Stand:  stand by assistance with no AD  Gait: 24 ft, SBA, no AD; decreased gait speed and step length, mild unsteadiness present, pt with occasional touch down support on wall  Balance:   Static Sitting: Supervision  Dynamic Sitting: Supervision  Static Standing: SBA  Dynamic Standing: SBA      AM-PAC 6 CLICK MOBILITY  Total Score:  19      Treatment & Education:  Patient educated on role of therapy, goals of session, and benefits of mobilizing.   Discussed PT plan of care during hospitalization.   Patient educated on calling for assistance.   Patient educated on how their diagnosis impacts their mobility within PT scope of practice.   All questions answered within PT scope of practice.    Patient left HOB elevated with all lines intact and call button in reach.    GOALS:   Multidisciplinary Problems       Physical Therapy Goals          Problem: Physical Therapy    Goal Priority Disciplines Outcome Goal  Variances Interventions   Physical Therapy Goal     PT, PT/OT Ongoing, Progressing     Description: Goals to be met by: 2022     Patient will increase functional independence with mobility by performin. Supine to sit with [place]  2. Sit to supine with [place]  3. Sit to stand transfer with [place]  4. Bed to chair transfer with [place] using No Assistive Device  5. Gait  x 200 feet with [place] using No Assistive Device.                          History:     Past Medical History:   Diagnosis Date    Anemia     Bronchitis 2017    Cancer 2016    kidney cancer    CHF (congestive heart failure), NYHA class II, chronic, systolic     CMV (cytomegalovirus) antibody positive     Essential hypertension 2015    H/O herpes simplex type 2 infection     Herpes simplex type 1 antibody positive     History of kidney cancer     s/p left nephrectomy 2016    Hyperparathyroidism, unspecified     Hyperuricemia without signs of inflammatory arthritis and tophaceous disease     Kidney stones     LGSIL (low grade squamous intraepithelial dysplasia)     Myocardiopathy 2017    Prediabetes     Proteinuria     Renal disorder     Thyroid nodule     Urate nephropathy        Past Surgical History:   Procedure Laterality Date    BREAST CYST EXCISION      COLONOSCOPY N/A 2015    COLONOSCOPY N/A 3/12/2021    Procedure: COLONOSCOPY;  Surgeon: Brendon Lanier MD;  Location: [facility];  Service: Endoscopy;  Laterality: N/A;  covid test 3/9, labs prior, prep instr mailed -ml    INSERTION OF BIVENTRICULAR IMPLANTABLE CARDIOVERTER-DEFIBRILLATOR (ICD)  2021    INSERTION OF TUNNELED CENTRAL VENOUS CATHETER (CVC) WITH SUBCUTANEOUS PORT N/A 2023    Procedure: INSERTION, DUAL LUMEN CATHETER WITH PORT, WITH IMAGING GUIDANCE;  Surgeon: FARIDEH Muñoz III, MD;  Location: [facility] OR [address] [place] [address];  Service: Peripheral Vascular;  Laterality: N/A;  possinble permcath placment     NEPHRECTOMY-LAPAROSCOPIC Left 01/12/2016    PERITONEAL CATHETER INSERTION      Permacath insertion  01/12/2017    PLACEMENT OF ARTERIOVENOUS GRAFT  12/28/2022    Procedure: INSERTION, GRAFT, ARTERIOVENOUS;  Surgeon: FARIDEH Muñoz III, MD;  [facility];  Service: Peripheral Vascular;;    REMOVAL, GRAFT, ARTERIOVENOUS, UPPER EXTREMITY Left 1/25/2023    Procedure: REMOVAL, GRAFT, ARTERIOVENOUS, UPPER EXTREMITY;  Surgeon: FARIDEH Muñoz III, MD;  [facility];  Service: Peripheral Vascular;  Laterality: Left;    REVISION OF PROCEDURE INVOLVING ARTERIOVENOUS GRAFT Left 12/28/2022    Procedure: REVISION, PROCEDURE INVOLVING ARTERIOVENOUS GRAFT;  Surgeon: FARIDEH Muñoz III, MD;  [facility];  Service: Peripheral Vascular;  Laterality: Left;    SALPINGOOPHORECTOMY Right 2016    KJB---DAVINCI    TONSILLECTOMY      TUBAL LIGATION         Time Tracking:     PT Received On: 01/30/23  PT Start Time: 1144     PT Stop Time: 1200  PT Total Time (min): 16 min     Billable Minutes: Evaluation 8 and Therapeutic Activity 8      01/31/2023

## 2023-01-31 NOTE — DISCHARGE INSTRUCTIONS
VASCULAR SURGERY DISCHARGE INSTRUCTIONS    Woundcare:  - If you have a dialysis catheter in place, keep your catheter dressing clean and dry  - When resting or sleeping, try to keep your arm elevated to shoulder level on pillows to reduce swelling    Activity:  - Avoid prolonged exertion of the affected arm  - Sleep with your arm elevated on pillows at night to reduce swelling  -- No swimming in pools, mLED, [email or web address] etc. for 6 weeks after your surgery    Diet:  -Resume your pre-operative home diet    Follow up:  -Refer to follow up instructions     Call Vascular Surgery Office at [phone] if you experience:  -Increased redness, warmth, tenderness, or draining pus at your incision  -Worsening fevers, chills, nausea/vomiting  -Pain, weakness, coldness, or numbness in your hand  -Uncontrolled pain  -Your call will be returned within 24 hours and further instructions will be provided    Go to ER/Urgent Care if you experience:  -Worsening shortness of breath or chest pain

## 2023-01-31 NOTE — PROGRESS NOTES
C3 nurse spoke with Eva Bloom  for a TCC post hospital discharge follow up call. The patient does not have a scheduled HOSFU appointment with Sowmya Mccain MD  within 5-7 days post hospital discharge date 1/30/2023. C3 nurse was unable to schedule HOSFU appointment in [facility].    Message sent to PCP staff requesting they contact patient and schedule follow up appointment.

## 2023-01-31 NOTE — PROGRESS NOTES
C3 nurse attempted to contact Eva Bloom  for a TCC post hospital discharge follow up call. No answer. Left voicemail with callback information. The patient does not have a scheduled HOSFU appointment. Message sent to PCP staff for assistance with scheduling visit with patient.

## 2023-02-01 ENCOUNTER — TELEPHONE (OUTPATIENT)
Dept: INFECTIOUS DISEASES | Facility: CLINIC | Age: 58
End: 2023-02-01
Payer: COMMERCIAL

## 2023-02-01 NOTE — TELEPHONE ENCOUNTER
----- Message from Jeyson Gallego DO sent at 2/1/2023  4:07 PM CST -----  Contact: [facility] @ [phone]  Yes they can get labs done at dialysis. Thank you!   ----- Message -----  From: Zo Camargo LPN  Sent: 2/1/2023   2:21 PM CST  To: Jeyson Gallego DO      ----- Message -----  From: Karen Ralph  Sent: 2/1/2023   2:08 PM CST  To: Juli[facility] Ben[facility]tez Staff    [facility] calling regarding Patient Advice (message) for #calling to see if its ok to get lab done while at dialysis, asking for call back

## 2023-02-01 NOTE — TELEPHONE ENCOUNTER
Called  back and left a message for [facility] stating that it is okay for pt to get labs done while at dialysis per Dr. Gallego.

## 2023-02-02 ENCOUNTER — PES CALL (OUTPATIENT)
Dept: ADMINISTRATIVE | Facility: CLINIC | Age: 58
End: 2023-02-02
Payer: COMMERCIAL

## 2023-02-03 ENCOUNTER — TELEPHONE (OUTPATIENT)
Dept: VASCULAR SURGERY | Facility: CLINIC | Age: 58
End: 2023-02-03
Payer: COMMERCIAL

## 2023-02-03 NOTE — TELEPHONE ENCOUNTER
Attempted to contact patient in response to message. Voice message left for patient requesting return call.   ----- Message from Gerber Blankenship sent at 2/3/2023 10:11 AM CST -----  Contact: [phone]  Pt is calling to get scheduled for a appt.  Pt needs 2 week surgery follow up   Pt would like a call back. [phone]

## 2023-02-06 ENCOUNTER — OFFICE VISIT (OUTPATIENT)
Dept: HOME HEALTH SERVICES | Facility: CLINIC | Age: 58
End: 2023-02-06
Payer: MEDICARE

## 2023-02-06 ENCOUNTER — TELEPHONE (OUTPATIENT)
Dept: VASCULAR SURGERY | Facility: CLINIC | Age: 58
End: 2023-02-06
Payer: COMMERCIAL

## 2023-02-06 DIAGNOSIS — T82.7XXA ARTERIOVENOUS GRAFT INFECTION, INITIAL ENCOUNTER: ICD-10-CM

## 2023-02-06 DIAGNOSIS — R53.81 DEBILITY: Primary | ICD-10-CM

## 2023-02-06 DIAGNOSIS — N18.30 STAGE 3 CHRONIC KIDNEY DISEASE, UNSPECIFIED WHETHER STAGE 3A OR 3B CKD: ICD-10-CM

## 2023-02-06 PROCEDURE — 99496 TRANSJ CARE MGMT HIGH F2F 7D: CPT | Mod: S$GLB,,, | Performed by: NURSE PRACTITIONER

## 2023-02-06 PROCEDURE — 99496 TRANSITIONAL CARE MANAGE SERVICE 7 DAY DISCHARGE: ICD-10-PCS | Mod: S$GLB,,, | Performed by: NURSE PRACTITIONER

## 2023-02-06 NOTE — TELEPHONE ENCOUNTER
Contacted pt and  to schedule FU appt.   ----- Message from Addis Del Rosario CMA sent at 2/6/2023  8:48 AM CST -----  Regarding: Please call  Contact: Otis Berg returning your call from 2/3/23. Please call Otis or [place].  [phone] or [phone]  Thank you

## 2023-02-08 ENCOUNTER — TELEPHONE (OUTPATIENT)
Dept: ADMINISTRATIVE | Facility: CLINIC | Age: 58
End: 2023-02-08
Payer: COMMERCIAL

## 2023-02-08 ENCOUNTER — OFFICE VISIT (OUTPATIENT)
Dept: ENDOCRINOLOGY | Facility: CLINIC | Age: 58
End: 2023-02-08
Payer: MEDICARE

## 2023-02-08 VITALS
RESPIRATION RATE: 15 BRPM | BODY MASS INDEX: 17.37 KG/M2 | DIASTOLIC BLOOD PRESSURE: 73 MMHG | TEMPERATURE: 98 F | OXYGEN SATURATION: 99 % | HEART RATE: 77 BPM | SYSTOLIC BLOOD PRESSURE: 138 MMHG | WEIGHT: 101.19 LBS | HEART RATE: 84 BPM | TEMPERATURE: 97 F | SYSTOLIC BLOOD PRESSURE: 147 MMHG | DIASTOLIC BLOOD PRESSURE: 72 MMHG

## 2023-02-08 DIAGNOSIS — N18.6 ESRD (END STAGE RENAL DISEASE): ICD-10-CM

## 2023-02-08 DIAGNOSIS — N25.81 HYPERPARATHYROIDISM, SECONDARY RENAL: ICD-10-CM

## 2023-02-08 PROCEDURE — 99999 PR PBB SHADOW E&M-EST. PATIENT-LVL V: ICD-10-PCS | Mod: PBBFAC,,, | Performed by: HOSPITALIST

## 2023-02-08 PROCEDURE — 99204 PR OFFICE/OUTPT VISIT, NEW, LEVL IV, 45-59 MIN: ICD-10-PCS | Mod: S$PBB,,, | Performed by: HOSPITALIST

## 2023-02-08 PROCEDURE — 99999 PR PBB SHADOW E&M-EST. PATIENT-LVL V: CPT | Mod: PBBFAC,,, | Performed by: HOSPITALIST

## 2023-02-08 PROCEDURE — 99215 OFFICE O/P EST HI 40 MIN: CPT | Mod: PBBFAC | Performed by: HOSPITALIST

## 2023-02-08 PROCEDURE — 99204 OFFICE O/P NEW MOD 45 MIN: CPT | Mod: S$PBB,,, | Performed by: HOSPITALIST

## 2023-02-08 NOTE — ASSESSMENT & PLAN NOTE
--[facility]; WC ordered, patient debilitated and needs WC to transfer and travel to outside appts and HD sessions

## 2023-02-08 NOTE — PROGRESS NOTES
[facility] [place]  Transition of Care Home Visit    Visit Date: 2023  Encounter Provider: Jeramie Smith   PCP:  Sowmya Mccain MD    PRESENTING HISTORY      Patient ID: Eva Bloom is a 57 y.o. female.    Consult Requested By:  Jeramie Smith  Reason for Consult:  Hospital Follow Up.    Eva is being seen at home due to being seen at home due to physical debility that presents a taxing effort to leave the home, to mitigate high risk of hospital readmission and/or due to the limited availability of reliable or safe options for transportation to the point of access to the provider. Prior to treatment on this visit the chart was reviewed and patient verbal consent was obtained.      Chief Complaint: Transitional Care        History of Present Illness: Ms. Eva Bloom is a 57 y.o. female who was recently admitted to the hospital.    Significant Diagnostics:  I have reviewed all pertinent imaging results/findings within the past 24 hours.     Assessment/Plan:      * Arteriovenous graft infection  57yM w/ PMH HTN, HFrEF, RCC s/p nephrectomy, and ESRD on HD with infected LUE AVG now s/p excision of AVG .     - ASA, statin  - Amlodipine, hydralazine, and metoprolol  - PRN pain meds  - Nephrology following for dialysis  - Wound vac placed Friday  Infectious disease recs as following:  - Blood cultures  - Continue broad spectrum coverage with Vancomycin  - DC Zosyn     Dispo: Appreciate SW assistance for home wound vac, coordinate antibiotics with dialysis center           Alison David MD  Vascular Surgery  [facility]  ___________________________________________________________________    Today: With this visit today patient is found sitting up on her couch with home health nurse at bedside doing wound care. Patient is AAOx3, able to verify her name and .  The patient currently has a wound VAC in place and is receiving wound care from [facility] home [facility].  Photo of wound below:      The  patient verbalizes that she continues to take vancomycin p.o..  Continues to attend HD sessions on Tuesday, Thursday, Saturday.  Has adequate transportation to all outside appointments; her  is her main source of transportation and is present for the visit.  I have called [facility] home [facility] at physical therapy and occupational therapy as the patient remains debilitated.  I have also ordered a wheelchair as the patient needs a wheelchair to maneuver outside of the home. She has had a significant decline since her hospitalization that requires her having a wheelchair; she is unable to ambulate. She is unable to effectively walk this time.  Patient denies falls.  Denies smoking, drinking alcohol, illicit drug use. Patient endorses ability to perform ADLs. Endorses eating x 3 meals per day, daily BMs, and adequate sleep pattern. No additional needs at this this time. All of my information was given to the patient and family if any questions or concerns arise.    VSS. Denies fever, chest pain, shortness of breath, nausea, vomiting, diarrhea. Risks of environmental exposure to coronavirus discussed including: social distancing, hand hygiene, and limiting departures from the home for necessities only.  Reports understanding and willingness to comply.  All hospital discharge orders reviewed and being followed, all medications reconciled and reviewed, patient and family verbalized understanding. No other needs identified at this time.      Attestation: Screening criteria to assess the level of the patient's risk for infection with COVID-19 as recommended by the CDC at the time of the above documented home visit concluded appropriateness to proceed. Universal precautions were maintained at all times, including provider use of 60% alcohol gel hand  immediately prior to entry and upon departing the patient's home.       Review of Systems   Constitutional:  Negative for activity change and appetite change.    HENT:  Negative for congestion and dental problem.    Eyes:  Negative for discharge and itching.   Respiratory:  Negative for choking and chest tightness.    Cardiovascular:  Negative for chest pain and palpitations.   Gastrointestinal:  Negative for rectal pain and vomiting.   Endocrine: Negative for cold intolerance and heat intolerance.   Genitourinary:  Negative for enuresis and flank pain.   Musculoskeletal:  Positive for gait problem. Negative for myalgias and neck pain.   Skin:  Negative for color change and wound.   Allergic/Immunologic: Negative for environmental allergies and food allergies.   Neurological:  Positive for weakness. Negative for tremors and syncope.   Hematological:  Does not bruise/bleed easily.   Psychiatric/Behavioral:  Negative for decreased concentration and dysphoric mood.      PAST HISTORY:     Past Medical History:   Diagnosis Date    Anemia     Bronchitis 03/01/2017    Cancer 2016    kidney cancer    CHF (congestive heart failure), NYHA class II, chronic, systolic     CMV (cytomegalovirus) antibody positive     Essential hypertension 9/23/2015    H/O herpes simplex type 2 infection     Herpes simplex type 1 antibody positive     History of kidney cancer     s/p left nephrectomy 1/2016    Hyperparathyroidism, unspecified     Hyperuricemia without signs of inflammatory arthritis and tophaceous disease     Kidney stones     LGSIL (low grade squamous intraepithelial dysplasia)     Myocardiopathy 7/21/2017    Prediabetes     Proteinuria     Renal disorder     Thyroid nodule     Urate nephropathy        Past Surgical History:   Procedure Laterality Date    BREAST CYST EXCISION      COLONOSCOPY N/A 11/12/2015    COLONOSCOPY N/A 3/12/2021    Procedure: COLONOSCOPY;  Surgeon: Brendon Lanier MD;  Location: [facility];  Service: Endoscopy;  Laterality: N/A;  covid test 3/9, labs prior, prep instr mailed -ml    INSERTION OF BIVENTRICULAR IMPLANTABLE CARDIOVERTER-DEFIBRILLATOR (ICD)   04/2021    INSERTION OF TUNNELED CENTRAL VENOUS CATHETER (CVC) WITH SUBCUTANEOUS PORT N/A 1/25/2023    Procedure: INSERTION, DUAL LUMEN CATHETER WITH PORT, WITH IMAGING GUIDANCE;  Surgeon: FARIDEH Muñoz III, MD;  [facility];  Service: Peripheral Vascular;  Laterality: N/A;  possinble permcath placment    NEPHRECTOMY-LAPAROSCOPIC Left 01/12/2016    PERITONEAL CATHETER INSERTION      Permacath insertion  01/12/2017    PLACEMENT OF ARTERIOVENOUS GRAFT  12/28/2022    Procedure: INSERTION, GRAFT, ARTERIOVENOUS;  Surgeon: FARIDEH Muñoz III, MD;  [facility];  Service: Peripheral Vascular;;    REMOVAL, GRAFT, ARTERIOVENOUS, UPPER EXTREMITY Left 1/25/2023    Procedure: REMOVAL, GRAFT, ARTERIOVENOUS, UPPER EXTREMITY;  Surgeon: FARIDEH Muñoz III, MD;  [facility];  Service: Peripheral Vascular;  Laterality: Left;    REVISION OF PROCEDURE INVOLVING ARTERIOVENOUS GRAFT Left 12/28/2022    Procedure: REVISION, PROCEDURE INVOLVING ARTERIOVENOUS GRAFT;  Surgeon: FARIDEH Muñoz III, MD;  Location: [facility] OR [address] [place] [address];  Service: Peripheral Vascular;  Laterality: Left;    SALPINGOOPHORECTOMY Right 2016    KJB---DAVINCI    TONSILLECTOMY      TUBAL LIGATION         Family History   Problem Relation Age of Onset    Hypertension Mother     Diabetes Father     Kidney disease Father     No Known Problems Son     No Known Problems Son     Diabetes Maternal Grandfather     No Known Problems Sister     No Known Problems Brother     No Known Problems Maternal Grandmother     No Known Problems Paternal Grandmother     No Known Problems Paternal Grandfather     Heart disease Sister     No Known Problems Sister     No Known Problems Brother     No Known Problems Maternal Aunt     No Known Problems Maternal Uncle     No Known Problems Paternal Aunt     No Known Problems Paternal Uncle     Breast cancer Neg Hx     Colon cancer Neg Hx     Cancer Neg Hx     Stroke Neg Hx     Amblyopia Neg Hx     Blindness  Neg Hx     Cataracts Neg Hx     Glaucoma Neg Hx     Macular degeneration Neg Hx     Retinal detachment Neg Hx     Strabismus Neg Hx     Thyroid disease Neg Hx        Social History     Socioeconomic History    Marital status:     Number of children: 2   Occupational History    Occupation: lucero     Employer: [id number]   Tobacco Use    Smoking status: Never    Smokeless tobacco: Never   Substance and Sexual Activity    Alcohol use: No     Comment: . 2 children. works at [employer].    Drug use: No    Sexual activity: Yes     Partners: Male     Social Determinants of Health     Financial Resource Strain: Low Risk     Difficulty of Paying Living Expenses: Not hard at all   Food Insecurity: No Food Insecurity    Worried About Running Out of Food in the Last Year: Never true    Ran Out of Food in the Last Year: Never true   Transportation Needs: No Transportation Needs    Lack of Transportation (Medical): No    Lack of Transportation (Non-Medical): No   Physical Activity: Insufficiently Active    Days of Exercise per Week: 3 days    Minutes of Exercise per Session: 30 min   Stress: No Stress Concern Present    Feeling of Stress : Not at all   Social Connections: Socially Integrated    Frequency of Communication with Friends and Family: More than three times a week    Frequency of Social Gatherings with Friends and Family: Once a week    Attends Congregational Services: 1 to 4 times per year    Active Member of Clubs or Organizations: Yes    Attends Club or Organization Meetings: 1 to 4 times per year    Marital Status:    Housing Stability: Low Risk     Unable to Pay for Housing in the Last Year: No    Number of Places Lived in the Last Year: 1    Unstable Housing in the Last Year: No       MEDICATIONS & ALLERGIES:     Current Outpatient Medications on File Prior to Visit   Medication Sig Dispense Refill    vancomycin 125 mg/5 mL Soln Take 5 mL by mouth every 6 hours for 10 days, THEN 5 mL twice  daily for 7 days, THEN 5 mL once daily for 7 days, THEN 5 mL every 3 days for 14 days. 450 mL 0    vancomycin 25 mg/mL solution Take 5 mL by mouth every 6 hours for 10 days, THEN 5 mL twice daily for 7 days, THEN 5 mL once daily for 7 days, THEN 5 mL every 3 days for 14 days. DISCARD REMAINDER. 360 mL 0    acetaminophen (TYLENOL) 500 MG tablet Take 500 mg by mouth every 6 (six) hours as needed for Pain.      apixaban (ELIQUIS) 2.5 mg Tab Take 1 tablet (2.5 mg total) by mouth 2 (two) times daily. 60 tablet 11    CABOMETYX 60 mg Tab TAKE ONE TABLET BY MOUTH ONCE DAILY AT THE SAME TIME ON AN EMPTY STOMACH AT LEAST 1 HOUR BEFORE OR 2 HOURS AFTER EATING. AVOID GRAPEFRUIT PRODUCTS 30 tablet 4    cloNIDine 0.3 mg/24 hr td ptwk (CATAPRES) 0.3 mg/24 hr Place 1 patch onto the skin every 7 days. 4 patch 11    epoetin david-epbx (RETACRIT INJ) Epoetin david - epbx (Retacrit)      gabapentin (NEURONTIN) 100 MG capsule Take 1 capsule (100 mg total) by mouth once daily. (Patient not taking: Reported on 1/31/2023) 30 capsule 11    hydrALAZINE (APRESOLINE) 100 MG tablet Take 1 tablet (100 mg total) by mouth every 12 (twelve) hours. 180 tablet 2    HYDROcodone-acetaminophen (NORCO) 5-325 mg per tablet Take 1 tablet by mouth every 4 (four) hours as needed for Pain. Causes drowsiness. Do not drive or operate machinery with this medication. Do not drink alcohol with this medication. Causes constipation. Take with stool softener. (Patient not taking: Reported on 1/31/2023) 10 tablet 0    lidocaine-prilocaine (EMLA) cream APPLY ATLEAST 30 MINUTES BEFORE TREATMENT 3 TIMES A WEEK 30 g 11    metoprolol succinate (TOPROL-XL) 100 MG 24 hr tablet Take 1/2 tablet (50mg) once daily 90 tablet 3    mv,Ca,min-folic acid-vit K1 (ONE-A-DAY WOMEN'S 50 PLUS) 400-20 mcg Tab Take 1 tablet by mouth once daily.      oxyCODONE (ROXICODONE) 5 MG immediate release tablet Take 1 tablet (5 mg total) by mouth every 4 (four) hours as needed for Pain. (Patient not  taking: Reported on 1/31/2023) 8 tablet 0    promethazine (PHENERGAN) 25 MG tablet Take 1 tablet (25 mg total) by mouth every 6 (six) hours as needed for Nausea. (Patient not taking: Reported on 2/8/2023) 15 tablet 0    sacubitriL-valsartan (ENTRESTO) 49-51 mg per tablet Take 1 tablet by mouth 2 (two) times daily. 60 tablet 11    sodium chloride 0.9% SolP 100 mL with iron sucrose 100 mg iron/5 mL Soln 100 mg 50 mg every Tues, Thurs, Sat.      [DISCONTINUED] amLODIPine (NORVASC) 5 MG tablet TAKE 1 TABLET BY MOUTH EVERY DAY 90 tablet 3     No current facility-administered medications on file prior to visit.        Review of patient's allergies indicates:   Allergen Reactions    Coreg [carvedilol] Other (See Comments)     Nausea/vomiting    Allopurinol      Other reaction(s): abnormal transaminases       OBJECTIVE:     Vital Signs:  Vitals:    02/08/23 1058   BP: 138/72   Pulse: 84   Resp: 15   Temp: 97 °F (36.1 °C)     There is no height or weight on file to calculate BMI.     Physical Exam:  Physical Exam  Vitals reviewed.   Constitutional:       Appearance: She is well-developed and underweight.   HENT:      Head: Normocephalic and atraumatic.   Eyes:      Pupils: Pupils are equal, round, and reactive to light.   Cardiovascular:      Rate and Rhythm: Normal rate.   Pulmonary:      Effort: Pulmonary effort is normal.      Breath sounds: Normal breath sounds.   Chest:      Comments: Vasc cath  Abdominal:      General: Bowel sounds are normal.      Palpations: Abdomen is soft.   Musculoskeletal:         General: Normal range of motion.      Cervical back: Normal range of motion and neck supple.   Skin:     General: Skin is warm and dry.      Comments: LUE wound; see photo in note above   Neurological:      Mental Status: She is alert and oriented to person, place, and time. Mental status is at baseline.      GCS: GCS eye subscore is 4. GCS verbal subscore is 5. GCS motor subscore is 6.   Psychiatric:         Attention  and Perception: Attention normal.         Mood and Affect: Mood normal.         Speech: Speech normal.     Laboratory  Lab Results   Component Value Date    WBC 6.02 01/30/2023    HGB 9.5 (L) 01/30/2023    HCT 31.8 (L) 01/30/2023     (H) 01/30/2023     (L) 01/30/2023     Lab Results   Component Value Date    INR 1.1 11/16/2022    INR 1.3 (H) 11/04/2022    INR 1.1 09/24/2022     Lab Results   Component Value Date    HGBA1C 4.8 10/22/2022     No results for input(s): POCTGLUCOSE in the last 72 hours.    TRANSITION OF CARE:     [facility] Contact Note: 1/31/23    Family and/or Caretaker present at visit?  Yes.  Diagnostic tests reviewed/disposition: No diagnosic tests pending after this hospitalization.  Disease/illness education: Importance of compliance with all prescribed medication and treatments, COVID precautions/Social Distancing/Mask Use  Home health/community services discussion/referrals: Patient has home health established at [facility] .   Establishment or re-establishment of referral orders for community resources: No other necessary community resources.   Discussion with other health care providers: No discussion with other health care providers necessary.     Transition of Care Visit:  I have reviewed and updated the history and problem list.  I have reconciled the medication list.  I have discussed the hospitalization and current medical issues, prognosis and plans with the patient/family.  I  spent more than 50% of time discussing the care with the patient/family.  Total Face-to-Face Encounter: 60 minutes.    Medications Reconciliation:   I have reconciled the patient's home medications and discharge medications with the patient/family. I have updated all changes.  Refer to After-Visit Medication List.    I have discussed discharge plans, follow-up instructions, future appointments, provider contact information, indicators to seek medical emergency treatment, and advisement to call with  additional questions or concerns. Patient and caregiver verbalize understanding.    ASSESSMENT & PLAN:     1. Debility  Assessment & Plan:  --[facility]; WC ordered, patient debilitated and needs WC to transfer and travel to outside appts and HD sessions      2. Stage 3 chronic kidney disease, unspecified whether stage 3a or 3b CKD  -     Ambulatory referral/consult to [facility]  -     WHEELCHAIR FOR HOME USE    3. Arteriovenous graft infection, initial encounter  Assessment & Plan:  --compliant with vanc, completing course  --no fever  --using vasc cath for HD sessions    Orders:  -     Ambulatory referral/consult to [facility]  -     WHEELCHAIR FOR HOME USE         Were controlled substances prescribed?  No    Instructions for the patient:    Scheduled Follow-up :  Future Appointments   Date Time Provider Department Center   2/10/2023  9:00 AM FARIDEH Muñoz III, MD [facility] HELENA [facility]   2/16/2023  1:00 PM Yonathan Paz MD [facility] ID [facility]   2/16/2023  3:30 PM Ben Rivera MD [facility] HEMONC3 [facility]   2/17/2023  7:45 AM LAB, [facility] [place]   2/20/2023 11:00 AM Kandis Brooks MD [facility] ENSRG [facility]   3/9/2023  9:00 AM Jeyson Gallego DO [facility] ID [facility]   3/15/2023  8:20 AM COORDINATED DEVICE CHECK [facility] ARRHPSARAH [facility]   3/15/2023  9:00 AM EKG, APPT [facility] EKG [facility]   3/15/2023 10:00 AM Leila Scott NP [facility] ARRHYTH [facility]   4/21/2023  8:30 AM Malik Burgos MD [facility] HNSO [facility]   5/5/2023  2:00 PM Mirna Larsen OD [facility] OPTOMTY [place]   5/12/2023  8:00 AM VASCULAR, LAB [facility] VASCLAB [facility]   5/12/2023  8:45 AM FARIDEH Muñoz III, MD [facility] JAYNACURTIS [facility]       After Visit Medication List :     Medication List            Accurate as of February 6, 2023 11:59 PM. If you have any questions, ask your nurse or doctor.                CONTINUE taking these medications      acetaminophen 500 MG tablet  Commonly known as: TYLENOL     apixaban 2.5 mg  Tab  Commonly known as: ELIQUIS  Take 1 tablet (2.5 mg total) by mouth 2 (two) times daily.     CABOMETYX 60 mg Tab  Generic drug: cabozantinib  TAKE ONE TABLET BY MOUTH ONCE DAILY AT THE SAME TIME ON AN EMPTY STOMACH AT LEAST 1 HOUR BEFORE OR 2 HOURS AFTER EATING. AVOID GRAPEFRUIT PRODUCTS     cloNIDine 0.3 mg/24 hr td ptwk 0.3 mg/24 hr  Commonly known as: CATAPRES  Place 1 patch onto the skin every 7 days.     ENTRESTO 49-51 mg per tablet  Generic drug: sacubitriL-valsartan  Take 1 tablet by mouth 2 (two) times daily.     gabapentin 100 MG capsule  Commonly known as: NEURONTIN  Take 1 capsule (100 mg total) by mouth once daily.     hydrALAZINE 100 MG tablet  Commonly known as: APRESOLINE  Take 1 tablet (100 mg total) by mouth every 12 (twelve) hours.     HYDROcodone-acetaminophen 5-325 mg per tablet  Commonly known as: NORCO  Take 1 tablet by mouth every 4 (four) hours as needed for Pain. Causes drowsiness. Do not drive or operate machinery with this medication. Do not drink alcohol with this medication. Causes constipation. Take with stool softener.     LIDOcaine-prilocaine cream  Commonly known as: EMLA  APPLY ATLEAST 30 MINUTES BEFORE TREATMENT 3 TIMES A WEEK     metoprolol succinate 100 MG 24 hr tablet  Commonly known as: TOPROL-XL  Take 1/2 tablet (50mg) once daily     ONE-A-DAY WOMEN'S 50 PLUS 400-20 mcg Tab  Generic drug: mv,Ca,min-folic acid-vit K1     oxyCODONE 5 MG immediate release tablet  Commonly known as: ROXICODONE  Take 1 tablet (5 mg total) by mouth every 4 (four) hours as needed for Pain.     promethazine 25 MG tablet  Commonly known as: PHENERGAN  Take 1 tablet (25 mg total) by mouth every 6 (six) hours as needed for Nausea.     RETACRIT INJ     sodium chloride 0.9% SolP 100 mL with iron sucrose 100 mg iron/5 mL Soln 100 mg     vancomycin 125 mg/5 mL Soln  Take 5 mL by mouth every 6 hours for 10 days, THEN 5 mL twice daily for 7 days, THEN 5 mL once daily for 7 days, THEN 5 mL every 3 days for  14 days.  Start taking on: January 31, 2023     vancomycin 25 mg/mL solution  Take 5 mL by mouth every 6 hours for 10 days, THEN 5 mL twice daily for 7 days, THEN 5 mL once daily for 7 days, THEN 5 mL every 3 days for 14 days. DISCARD REMAINDER.              Signature: Jeramie Smith NP

## 2023-02-08 NOTE — PROGRESS NOTES
Subjective:      Patient ID: Eva Bloom is a 57 y.o. female presented to [facility] on 2/8/2023.  Chief Complaint:  Hyperparathyroidism      History of Present Illness: Eva Bloom is a 57 y.o. female here for secondary hyperparathyroidism in setting of ESRD  Other significant past medical history:  end-stage renal disease, recent admission for sepsis 2 months ago, CHF EF 20%, AFib on Eliquis    With regards to the hyperparathyroidism, secondary to ESRD  - On ESRD since 2016, TTS, dialysis at [facility]  - She report not being on Phosphate Binder: Denies on sevelamer, Renvela  - She report only taking 1 tablet three times a day, she has taken until recently  - Patient reports getting medication at dialysis, unclear what type  - She takes MVN   - With elevated PTH level at: 4541  - no hypercalcemia: Hypocalcemia noted on blood work, calcium 8.2, elevated phos on outside record  - patient has appointment with Endocrine surgery: Dr. Brooks on 2/20/2023    Past medical history significant for:  - ESRD:yes  - Vitamin-D deficiency:  Unknown  - Osteoporosis: yes suspect CKDMD  - Renal stones: no  - Gastric bypass/weight loss surgery/Crohn's/ulcerative colitis:  no  - History of cancer/with metastasis: no  - Family history of renal stones/hypercalcemia: no  - Tobacco use, including use in the past: no     Lab Results   Component Value Date    .0 (H) 10/05/2015    .0 (H) 05/25/2015    .0 (H) 01/08/2015    PTH 99 (H) 08/11/2014    CALCIUM 8.2 (L) 01/30/2023    CALCIUM 8.1 (L) 01/29/2023    CALCIUM 8.1 (L) 01/28/2023    CALCIUM 8.3 (L) 01/27/2023    CALCIUM 8.2 (L) 01/26/2023     Lab Results   Component Value Date    JKIQSLKU29ZN 26 (L) 05/25/2015    GSONZTXC40BU 31 08/11/2014    PTSAFWYN29SN 31 07/18/2013    ALKPHOS 163 (H) 12/19/2022    ALKPHOS 164 (H) 12/18/2022    PHOS 4.4 01/30/2023    TSH 8.611 (H) 11/30/2022     24Hour Urine  Lab Results   Component Value Date     TLAOECT64CDV 0.5 (L) 06/28/2007    CAURMGSPEC 12.8 06/28/2007    CRTURNMGSPEC 910.0 09/24/2009     Outside record          Reviewed past surgical, medical, family, social history and updated as appropriate.  Review of Systems: see HPI above    Objective:   BP (!) 147/73 (BP Location: Right arm)   Pulse 77   Temp 98 °F (36.7 °C) (Oral)   Wt 45.9 kg (101 lb 3.2 oz)   LMP 10/24/2016   BMI 17.37 kg/m²   Body mass index is 17.37 kg/m².  Vital signs reviewed    Physical Exam  Vitals and nursing note reviewed.   Constitutional:       Appearance: Normal appearance. She is well-developed. She is not ill-appearing.      Comments: Frailed elderly appearing female in wheelchair   Neck:      Thyroid: No thyromegaly.   Pulmonary:      Effort: Pulmonary effort is normal. No respiratory distress.   Musculoskeletal:         General: Normal range of motion.      Cervical back: Normal range of motion.   Neurological:      General: No focal deficit present.      Mental Status: She is alert. Mental status is at baseline.   Psychiatric:         Mood and Affect: Mood normal.         Behavior: Behavior normal.     Lab Reviewed:  See results in subjective  Lab Results   Component Value Date    CHOL 101 (L) 10/22/2022    HDL 35 (L) 10/22/2022    LDLCALC 46.6 (L) 10/22/2022    TRIG 97 10/22/2022    CHOLHDL 34.7 10/22/2022     Lab Results   Component Value Date     01/30/2023    K 3.5 01/30/2023     01/30/2023    CO2 21 (L) 01/30/2023     01/30/2023    BUN 45 (H) 01/30/2023    CREATININE 5.1 (H) 01/30/2023    CALCIUM 8.2 (L) 01/30/2023    PHOS 4.4 01/30/2023    PROT 5.6 (L) 12/19/2022    ALBUMIN 1.7 (L) 01/28/2023    BILITOT 0.7 12/19/2022    ALKPHOS 163 (H) 12/19/2022    AST 88 (H) 12/19/2022    ALT 27 12/19/2022    ANIONGAP 12 01/30/2023    ESTGFRAFRICA 13 (A) 06/28/2022    EGFRNONAA 11 (A) 06/28/2022    TSH 8.611 (H) 11/30/2022    .0 (H) 10/05/2015    GAAXNFHI34FS 26 (L) 05/25/2015     Assessment     1.  Hyperparathyroidism, secondary renal  Ambulatory referral/consult to Endocrinology      2. ESRD (end stage renal disease)  Ambulatory referral/consult to Endocrinology         Plan     No problem-specific Assessment & Plan notes found for this encounter.    Hyperparathyroidism, secondary to ESRD  - Hyperparathyroidism due to CKD/ESRD on dialysis  - patient with elevated PTH level, now at 4541, with subsequent hypocalcemia, hyperphosphatemia  - Mechanism of hyperparathyroidism was explained to pt, all questions were answered  - Long standing hx of ESRD on chronic dialysis therapy, leading to increase bone turnover   - Treatment hyperparathyroidism are limited:  Agree for surgical evaluation with endocrine surgery  - Treatment: need to lower phosphate level with phosphate binders (advised patient to discuss with her nephrologist about phosphate binder) and limiting dietary phosphate intake via diet  - Increase serum calcium and vit D supplementation  - check routine lab work: Calcium, phos, CMP, PTH, vitamin-D  - advised patient to continue follow-up with nephrologist for evaluation and monitoring of calcium, PTH  - if parathyroidectomy is plan, high risk of hungry bone syndrome.  Will need aggressive repletion of calcium postoperatively      ESRD (end stage renal disease)  - see plan above      Advised patient to follow up with PCP for routine health maintenance care.   RTC as needed pending evaluation for possible parathyroidectomy     Carlos Parker M.D.  Endocrinology  [facility]  2/8/2023      Disclaimer: This note has been generated in part with the use of voice-recognition software. There may be typographical errors that have been missed during proof-reading.

## 2023-02-08 NOTE — TELEPHONE ENCOUNTER
Faxed orders for WC and demographics [facility] per NP request.  Fax confirmation received.

## 2023-02-09 ENCOUNTER — EXTERNAL HOME HEALTH (OUTPATIENT)
Dept: HOME HEALTH SERVICES | Facility: HOSPITAL | Age: 58
End: 2023-02-09
Payer: MEDICARE

## 2023-02-10 ENCOUNTER — OFFICE VISIT (OUTPATIENT)
Dept: VASCULAR SURGERY | Facility: CLINIC | Age: 58
End: 2023-02-10
Payer: MEDICARE

## 2023-02-10 VITALS
DIASTOLIC BLOOD PRESSURE: 67 MMHG | TEMPERATURE: 98 F | HEIGHT: 64 IN | HEART RATE: 70 BPM | SYSTOLIC BLOOD PRESSURE: 145 MMHG | BODY MASS INDEX: 16.93 KG/M2 | WEIGHT: 99.19 LBS

## 2023-02-10 DIAGNOSIS — N18.6 ESRD (END STAGE RENAL DISEASE): Primary | ICD-10-CM

## 2023-02-10 PROCEDURE — 99024 PR POST-OP FOLLOW-UP VISIT: ICD-10-PCS | Mod: POP,,, | Performed by: SURGERY

## 2023-02-10 PROCEDURE — 99024 POSTOP FOLLOW-UP VISIT: CPT | Mod: POP,,, | Performed by: SURGERY

## 2023-02-10 PROCEDURE — 99214 OFFICE O/P EST MOD 30 MIN: CPT | Mod: PBBFAC | Performed by: SURGERY

## 2023-02-10 PROCEDURE — 99999 PR PBB SHADOW E&M-EST. PATIENT-LVL IV: CPT | Mod: PBBFAC,,, | Performed by: SURGERY

## 2023-02-10 PROCEDURE — 99999 PR PBB SHADOW E&M-EST. PATIENT-LVL IV: ICD-10-PCS | Mod: PBBFAC,,, | Performed by: SURGERY

## 2023-02-10 NOTE — PROGRESS NOTES
VASCULAR SURGERY SERVICE    CHIEF COMPLAINT:  Functional left AV graft dysfunctional left AV graft    HISTORY OF PRESENT ILLNESS: Eva Bloom is a 57 y.o. female end-stage renal disease, with multiple serious medical comorbidities including admission for sepsis 2 months ago, EF 20%, AFib on Eliquis, who is having increasing bleeding after her dialysis runs.  I placed a revision, left AV graft with long interposition Accu Seal 12/28/2022.  She has had uninterrupted use of this graft ever since.    Recent weeks she is had increased bleeding after dialysis sticks    S/p:  Excision, excluded L AVG and permacath 1/25/2023  revision, left AV graft with long interposition Accu Seal 12/28/2022  Original left AV graft 2017 with subsequent covered stent placement in venous outflow    1/13/2023:  This is a 1st postoperative visit after revision, left AV graft with long interposition Accu Seal 12/28/2022.  She is using the revised graft without any difficulty    1/24/2023:  She now returns with pain and erythema at her old excluded left AV graft.  She continues to use the new graft without difficulty which was tunneled laterally.  She last dialyzed today.  She also has a cold    2/10/2023:  This is her initial follow-up after excision of the excluded left AV graft 2 weeks ago.  She is without complaint     Past Medical History:   Diagnosis Date    Anemia     Bronchitis 03/01/2017    Cancer 2016    kidney cancer    CHF (congestive heart failure), NYHA class II, chronic, systolic     CMV (cytomegalovirus) antibody positive     Essential hypertension 9/23/2015    H/O herpes simplex type 2 infection     Herpes simplex type 1 antibody positive     History of kidney cancer     s/p left nephrectomy 1/2016    Hyperparathyroidism, unspecified     Hyperuricemia without signs of inflammatory arthritis and tophaceous disease     Kidney stones     LGSIL (low grade squamous intraepithelial dysplasia)     Myocardiopathy 7/21/2017     Prediabetes     Proteinuria     Renal disorder     Thyroid nodule     Urate nephropathy        Past Surgical History:   Procedure Laterality Date    BREAST CYST EXCISION      COLONOSCOPY N/A 11/12/2015    COLONOSCOPY N/A 3/12/2021    Procedure: COLONOSCOPY;  Surgeon: Brendon Lanier MD;  Location: [facility];  Service: Endoscopy;  Laterality: N/A;  covid test 3/9, labs prior, prep instr mailed -ml    INSERTION OF BIVENTRICULAR IMPLANTABLE CARDIOVERTER-DEFIBRILLATOR (ICD)  04/2021    INSERTION OF TUNNELED CENTRAL VENOUS CATHETER (CVC) WITH SUBCUTANEOUS PORT N/A 1/25/2023    Procedure: INSERTION, DUAL LUMEN CATHETER WITH PORT, WITH IMAGING GUIDANCE;  Surgeon: FARIDEH Muñoz III, MD;  Location: [facility] OR 2ND FLR;  Service: Peripheral Vascular;  Laterality: N/A;  possinble permcath placment    NEPHRECTOMY-LAPAROSCOPIC Left 01/12/2016    PERITONEAL CATHETER INSERTION      Permacath insertion  01/12/2017    PLACEMENT OF ARTERIOVENOUS GRAFT  12/28/2022    Procedure: INSERTION, GRAFT, ARTERIOVENOUS;  Surgeon: FARIDEH Muñoz III, MD;  Location: [facility] OR [facility] FLR;  Service: Peripheral Vascular;;    REMOVAL, GRAFT, ARTERIOVENOUS, UPPER EXTREMITY Left 1/25/2023    Procedure: REMOVAL, GRAFT, ARTERIOVENOUS, UPPER EXTREMITY;  Surgeon: FARIDEH Muñoz III, MD;  Location: [facility] OR 2ND FLR;  Service: Peripheral Vascular;  Laterality: Left;    REVISION OF PROCEDURE INVOLVING ARTERIOVENOUS GRAFT Left 12/28/2022    Procedure: REVISION, PROCEDURE INVOLVING ARTERIOVENOUS GRAFT;  Surgeon: FARIDEH Muñoz III, MD;  Location: [facility] OR 2ND FLR;  Service: Peripheral Vascular;  Laterality: Left;    SALPINGOOPHORECTOMY Right 2016    KJB---DAVINCI    TONSILLECTOMY      TUBAL LIGATION           Current Outpatient Medications:     acetaminophen (TYLENOL) 500 MG tablet, Take 500 mg by mouth every 6 (six) hours as needed for Pain., Disp: , Rfl:     apixaban (ELIQUIS) 2.5 mg Tab, Take 1 tablet (2.5 mg total) by mouth 2 (two) times daily., Disp: 60  tablet, Rfl: 11    CABOMETYX 60 mg Tab, TAKE ONE TABLET BY MOUTH ONCE DAILY AT THE SAME TIME ON AN EMPTY STOMACH AT LEAST 1 HOUR BEFORE OR 2 HOURS AFTER EATING. AVOID GRAPEFRUIT PRODUCTS, Disp: 30 tablet, Rfl: 4    cloNIDine 0.3 mg/24 hr td ptwk (CATAPRES) 0.3 mg/24 hr, Place 1 patch onto the skin every 7 days., Disp: 4 patch, Rfl: 11    epoetin david-epbx (RETACRIT INJ), Epoetin david - epbx (Retacrit), Disp: , Rfl:     gabapentin (NEURONTIN) 100 MG capsule, Take 1 capsule (100 mg total) by mouth once daily., Disp: 30 capsule, Rfl: 11    HYDROcodone-acetaminophen (NORCO) 5-325 mg per tablet, Take 1 tablet by mouth every 4 (four) hours as needed for Pain. Causes drowsiness. Do not drive or operate machinery with this medication. Do not drink alcohol with this medication. Causes constipation. Take with stool softener., Disp: 10 tablet, Rfl: 0    lidocaine-prilocaine (EMLA) cream, APPLY ATLEAST 30 MINUTES BEFORE TREATMENT 3 TIMES A WEEK, Disp: 30 g, Rfl: 11    metoprolol succinate (TOPROL-XL) 100 MG 24 hr tablet, Take 1/2 tablet (50mg) once daily, Disp: 90 tablet, Rfl: 3    mv,Ca,min-folic acid-vit K1 (ONE-A-DAY WOMEN'S 50 PLUS) 400-20 mcg Tab, Take 1 tablet by mouth once daily., Disp: , Rfl:     oxyCODONE (ROXICODONE) 5 MG immediate release tablet, Take 1 tablet (5 mg total) by mouth every 4 (four) hours as needed for Pain., Disp: 8 tablet, Rfl: 0    promethazine (PHENERGAN) 25 MG tablet, Take 1 tablet (25 mg total) by mouth every 6 (six) hours as needed for Nausea., Disp: 15 tablet, Rfl: 0    sacubitriL-valsartan (ENTRESTO) 49-51 mg per tablet, Take 1 tablet by mouth 2 (two) times daily., Disp: 60 tablet, Rfl: 11    sodium chloride 0.9% SolP 100 mL with iron sucrose 100 mg iron/5 mL Soln 100 mg, 50 mg every Tues, Thurs, Sat., Disp: , Rfl:     vancomycin 125 mg/5 mL Soln, Take 5 mL by mouth every 6 hours for 10 days, THEN 5 mL twice daily for 7 days, THEN 5 mL once daily for 7 days, THEN 5 mL every 3 days for 14 days.,  "Disp: 450 mL, Rfl: 0    vancomycin 25 mg/mL solution, Take 5 mL by mouth every 6 hours for 10 days, THEN 5 mL twice daily for 7 days, THEN 5 mL once daily for 7 days, THEN 5 mL every 3 days for 14 days. DISCARD REMAINDER., Disp: 360 mL, Rfl: 0    hydrALAZINE (APRESOLINE) 100 MG tablet, Take 1 tablet (100 mg total) by mouth every 12 (twelve) hours., Disp: 180 tablet, Rfl: 2    Review of patient's allergies indicates:   Allergen Reactions    Coreg [carvedilol] Other (See Comments)     Nausea/vomiting    Allopurinol      Other reaction(s): abnormal transaminases       Family History   Problem Relation Age of Onset    Hypertension Mother     Diabetes Father     Kidney disease Father     No Known Problems Son     No Known Problems Son     Diabetes Maternal Grandfather     No Known Problems Sister     No Known Problems Brother     No Known Problems Maternal Grandmother     No Known Problems Paternal Grandmother     No Known Problems Paternal Grandfather     Heart disease Sister     No Known Problems Sister     No Known Problems Brother     No Known Problems Maternal Aunt     No Known Problems Maternal Uncle     No Known Problems Paternal Aunt     No Known Problems Paternal Uncle     Breast cancer Neg Hx     Colon cancer Neg Hx     Cancer Neg Hx     Stroke Neg Hx     Amblyopia Neg Hx     Blindness Neg Hx     Cataracts Neg Hx     Glaucoma Neg Hx     Macular degeneration Neg Hx     Retinal detachment Neg Hx     Strabismus Neg Hx     Thyroid disease Neg Hx        Social History     Tobacco Use    Smoking status: Never    Smokeless tobacco: Never   Substance Use Topics    Alcohol use: No     Comment: . 2 children. works at [employer].    Drug use: No       PHYSICAL EXAM:   BP (!) 145/67 (BP Location: Right arm, Patient Position: Sitting, BP Method: Large (Automatic))   Pulse 70   Temp 97.9 °F (36.6 °C) (Oral)   Ht 5' 4" (1.626 m)   Wt 45 kg (99 lb 3.3 oz)   LMP 10/24/2016   BMI 17.03 kg/m²   Constitutional:  Alert, "   Well-appearing  In no distress.   Neurological: Normal speech  no focal findings  CN II - XII grossly intact.    Psychiatric: Mood and affect appropriate and symmetric.   HEENT: Normocephalic / atraumatic  PERRLA  Midline trachea  No scars across the neck   Cardiac: Regular rate and rhythm.   Pulmonary: Normal pulmonary effort.   Abdomen: Soft, not distended.     Skin: Warm and well perfused.    Vascular:  Radial pulses are nonpalpable bilaterally.   Extremities/  Musculoskeletal: No edema.   Left arm demonstrates a thrill in the lateral revised graft.  The inner area that was excised is clean, with only minimal beginning granulation tissue.  See picture   2/10/2023      IMAGING:  Prior Duplex of the left AV graft showed to be patent without stenosis with a flow volume of 1.78 L      IMPRESSION:  2 weeks status post excision, infected excluded AV graft.  There areas cleaned but with minimal granulation tissue.  As per the picture demonstrated appears that the sponges being placed on large areas of ajoining skin    PLAN   Will discuss with home health to place the sponge only on the open wound  Follow-up in 2 weeks with me and our wound care team    MANPREET Muñoz III, MD, FACS  Professor and Chief, Vascular and Endovascular Surgery

## 2023-02-11 NOTE — DISCHARGE SUMMARY
HOSPITAL DISCHARGE SUMMARY      I.   IDENTIFYING DATA         A.  Name:Eva Bloom              Sex:female              MRN:[id number]              :1965              Date of admission:2023  5:52 AM              Date of discharge: 2023  7:44 PM       II. SUCCINCT SUMMARY OF ADMISSION STATUS AND HOSPITAL COURSE    For details of hospital stay, please refer to daily progress notes. Briefly, this is a 57 y.o. female presented on  admitted for infected old AV graft. Patient was taken to OR and underwent excision of of L upper arm AVG. Post-operatively patient was admitted to the floor and had wound vac placement. She had an uncomplicated hospital recovery.     On the day of discharge, the patient was ambulating without difficulty, voiding spontaneously, was tolerating a diet without nausea or vomiting, and pain was well controlled on PO pain medications. Discharge instructions were explained to the patient and appropriate follow-up was arranged.      III. PATIENT'S MEDICAL CONDITION AT DISCHARGE       good    IV. SUMMARY OF PROCEDURE(S) PERFORMED          Procedure(s) (LRB):  REMOVAL, GRAFT, ARTERIOVENOUS, UPPER EXTREMITY (Left)  INSERTION, DUAL LUMEN CATHETER WITH PORT, WITH IMAGING GUIDANCE (N/A)      V.  DISCHARGE DIAGNOSES        Arteriovenous graft infection    ESRD (end stage renal disease)    Anemia in ESRD (end-stage renal disease)    Chronic kidney disease-mineral and bone disorder    Clostridium difficile infection       VI. RESULTS PENDING AT TIME OF DISCHARGE         None    VII. MEDICATIONS TAKEN PRIOR TO ADMISSION    Current Outpatient Medications   Medication Instructions    acetaminophen (TYLENOL) 500 mg, Oral, Every 6 hours PRN    apixaban (ELIQUIS) 2.5 mg, Oral, 2 times daily    CABOMETYX 60 mg Tab TAKE ONE TABLET BY MOUTH ONCE DAILY AT THE SAME TIME ON AN EMPTY STOMACH AT LEAST 1 HOUR BEFORE OR 2 HOURS AFTER EATING. AVOID GRAPEFRUIT PRODUCTS    cloNIDine 0.3 mg/24 hr td ptwk  (CATAPRES) 0.3 mg/24 hr 1 patch, Transdermal, Every 7 days    epoetin david-epbx (RETACRIT INJ) Epoetin david - epbx (Retacrit)    gabapentin (NEURONTIN) 100 mg, Oral, Daily    hydrALAZINE (APRESOLINE) 100 mg, Oral, Every 12 hours    HYDROcodone-acetaminophen (NORCO) 5-325 mg per tablet 1 tablet, Oral, Every 4 hours PRN, Causes drowsiness. Do not drive or operate machinery with this medication. Do not drink alcohol with this medication. Causes constipation. Take with stool softener.    lidocaine-prilocaine (EMLA) cream APPLY ATLEAST 30 MINUTES BEFORE TREATMENT 3 TIMES A WEEK    metoprolol succinate (TOPROL-XL) 100 MG 24 hr tablet Take 1/2 tablet (50mg) once daily    mv,Ca,min-folic acid-vit K1 (ONE-A-DAY WOMEN'S 50 PLUS) 400-20 mcg Tab 1 tablet, Oral, Daily    oxyCODONE (ROXICODONE) 5 mg, Oral, Every 4 hours PRN    promethazine (PHENERGAN) 25 mg, Oral, Every 6 hours PRN    sacubitriL-valsartan (ENTRESTO) 49-51 mg per tablet 1 tablet, Oral, 2 times daily    sodium chloride 0.9% SolP 100 mL with iron sucrose 100 mg iron/5 mL Soln 100 mg 50 mg, Every Tues, Thurs, Sat    vancomycin 125 mg/5 mL Soln Take 5 mL by mouth every 6 hours for 10 days, THEN 5 mL twice daily for 7 days, THEN 5 mL once daily for 7 days, THEN 5 mL every 3 days for 14 days.    vancomycin 25 mg/mL solution Take 5 mL by mouth every 6 hours for 10 days, THEN 5 mL twice daily for 7 days, THEN 5 mL once daily for 7 days, THEN 5 mL every 3 days for 14 days. DISCARD REMAINDER.        VIII. MEDICATIONS TO BE TAKEN FOLLOWING DISCHARGE       Medication List        START taking these medications      oxyCODONE 5 MG immediate release tablet  Commonly known as: ROXICODONE  Take 1 tablet (5 mg total) by mouth every 4 (four) hours as needed for Pain.     vancomycin 125 mg/5 mL Soln  Take 5 mL by mouth every 6 hours for 10 days, THEN 5 mL twice daily for 7 days, THEN 5 mL once daily for 7 days, THEN 5 mL every 3 days for 14 days.  Start taking on: January 31, 2023             CONTINUE taking these medications      acetaminophen 500 MG tablet  Commonly known as: TYLENOL     apixaban 2.5 mg Tab  Commonly known as: ELIQUIS  Take 1 tablet (2.5 mg total) by mouth 2 (two) times daily.     CABOMETYX 60 mg Tab  Generic drug: cabozantinib  TAKE ONE TABLET BY MOUTH ONCE DAILY AT THE SAME TIME ON AN EMPTY STOMACH AT LEAST 1 HOUR BEFORE OR 2 HOURS AFTER EATING. AVOID GRAPEFRUIT PRODUCTS     cloNIDine 0.3 mg/24 hr td ptwk 0.3 mg/24 hr  Commonly known as: CATAPRES  Place 1 patch onto the skin every 7 days.     ENTRESTO 49-51 mg per tablet  Generic drug: sacubitriL-valsartan  Take 1 tablet by mouth 2 (two) times daily.     gabapentin 100 MG capsule  Commonly known as: NEURONTIN  Take 1 capsule (100 mg total) by mouth once daily.     hydrALAZINE 100 MG tablet  Commonly known as: APRESOLINE  Take 1 tablet (100 mg total) by mouth every 12 (twelve) hours.     HYDROcodone-acetaminophen 5-325 mg per tablet  Commonly known as: NORCO  Take 1 tablet by mouth every 4 (four) hours as needed for Pain. Causes drowsiness. Do not drive or operate machinery with this medication. Do not drink alcohol with this medication. Causes constipation. Take with stool softener.     LIDOcaine-prilocaine cream  Commonly known as: EMLA  APPLY ATLEAST 30 MINUTES BEFORE TREATMENT 3 TIMES A WEEK     metoprolol succinate 100 MG 24 hr tablet  Commonly known as: TOPROL-XL  Take 1/2 tablet (50mg) once daily     ONE-A-DAY WOMEN'S 50 PLUS 400-20 mcg Tab  Generic drug: mv,Ca,min-folic acid-vit K1     promethazine 25 MG tablet  Commonly known as: PHENERGAN  Take 1 tablet (25 mg total) by mouth every 6 (six) hours as needed for Nausea.     RETACRIT INJ     sodium chloride 0.9% SolP 100 mL with iron sucrose 100 mg iron/5 mL Soln 100 mg            STOP taking these medications      cephALEXin 500 MG capsule  Commonly known as: KEFLEX            ASK your doctor about these medications      vancomycin 25 mg/mL solution  Take 5 mL by mouth  every 6 hours for 10 days, THEN 5 mL twice daily for 7 days, THEN 5 mL once daily for 7 days, THEN 5 mL every 3 days for 14 days. DISCARD REMAINDER.               Where to Get Your Medications        These medications were sent to [facility]  [address] [facility] [address]      Hours: Mon-Fri 7a-7p, Sat-Sun 10a-4p Phone: [phone]   oxyCODONE 5 MG immediate release tablet  vancomycin 125 mg/5 mL Soln  vancomycin 25 mg/mL solution          IX.  DISCHARGE ORDERS AND INSTRUCTIONS    Discharge Procedure Orders   Diet Adult Regular     Notify your health care provider if you experience any of the following:  temperature >100.4     Notify your health care provider if you experience any of the following:  persistent nausea and vomiting or diarrhea     Notify your health care provider if you experience any of the following:  severe uncontrolled pain     Notify your health care provider if you experience any of the following:  redness, tenderness, or signs of infection (pain, swelling, redness, odor or green/yellow discharge around incision site)     Notify your health care provider if you experience any of the following:  difficulty breathing or increased cough       X. DISCHARGE DISPOSITION          Home or Self Care      Telma Patino M.D.  General Surgery, PGY-1

## 2023-02-14 ENCOUNTER — HOSPITAL ENCOUNTER (EMERGENCY)
Facility: HOSPITAL | Age: 58
Discharge: HOME | End: 2023-02-14
Attending: EMERGENCY MEDICINE
Payer: MEDICARE

## 2023-02-14 VITALS
RESPIRATION RATE: 19 BRPM | DIASTOLIC BLOOD PRESSURE: 66 MMHG | HEIGHT: 64 IN | WEIGHT: 105 LBS | HEART RATE: 83 BPM | SYSTOLIC BLOOD PRESSURE: 128 MMHG | BODY MASS INDEX: 17.93 KG/M2 | TEMPERATURE: 98 F | OXYGEN SATURATION: 100 %

## 2023-02-14 DIAGNOSIS — M25.569 KNEE PAIN: ICD-10-CM

## 2023-02-14 DIAGNOSIS — S93.609A SPRAIN OF FOOT, UNSPECIFIED LATERALITY, INITIAL ENCOUNTER: ICD-10-CM

## 2023-02-14 DIAGNOSIS — S99.919A ANKLE INJURY: ICD-10-CM

## 2023-02-14 DIAGNOSIS — S83.91XA KNEE SPRAIN, BILATERAL: Primary | ICD-10-CM

## 2023-02-14 DIAGNOSIS — S83.92XA KNEE SPRAIN, BILATERAL: Primary | ICD-10-CM

## 2023-02-14 DIAGNOSIS — S93.409A SPRAIN OF ANKLE, UNSPECIFIED LATERALITY, UNSPECIFIED LIGAMENT, INITIAL ENCOUNTER: ICD-10-CM

## 2023-02-14 DIAGNOSIS — S99.929A FOOT INJURY: ICD-10-CM

## 2023-02-14 PROCEDURE — 63600175 PHARM REV CODE 636 W HCPCS: Performed by: EMERGENCY MEDICINE

## 2023-02-14 PROCEDURE — 99285 EMERGENCY DEPT VISIT HI MDM: CPT | Mod: 25

## 2023-02-14 PROCEDURE — 96372 THER/PROPH/DIAG INJ SC/IM: CPT | Performed by: EMERGENCY MEDICINE

## 2023-02-14 RX ORDER — POLYETHYLENE GLYCOL 3350 17 G/17G
17 POWDER, FOR SOLUTION ORAL DAILY
Qty: 235 G | Refills: 0 | Status: SHIPPED | OUTPATIENT
Start: 2023-02-14 | End: 2023-08-25

## 2023-02-14 RX ORDER — OXYCODONE HYDROCHLORIDE 5 MG/1
5 CAPSULE ORAL EVERY 6 HOURS PRN
Qty: 12 CAPSULE | Refills: 0 | Status: SHIPPED | OUTPATIENT
Start: 2023-02-14 | End: 2023-05-24

## 2023-02-14 RX ORDER — NALOXONE HYDROCHLORIDE 1 MG/ML
INJECTION INTRAMUSCULAR; INTRAVENOUS; SUBCUTANEOUS
Qty: 2 ML | Refills: 11 | Status: SHIPPED | OUTPATIENT
Start: 2023-02-14

## 2023-02-14 RX ORDER — HYDROMORPHONE HYDROCHLORIDE 2 MG/ML
1 INJECTION, SOLUTION INTRAMUSCULAR; INTRAVENOUS; SUBCUTANEOUS
Status: COMPLETED | OUTPATIENT
Start: 2023-02-14 | End: 2023-02-14

## 2023-02-14 RX ADMIN — HYDROMORPHONE HYDROCHLORIDE 1 MG: 2 INJECTION, SOLUTION INTRAMUSCULAR; INTRAVENOUS; SUBCUTANEOUS at 10:02

## 2023-02-14 NOTE — ED TRIAGE NOTES
"Pt presents to the ED today with CC of a fall. Pt stated her left leg "gave out" and she fell. Pt was on the ground for approximately 15 minutes  before family arrived to help her up. Denies hitting her head and denies dizziness. Per pts , pt has been having "issues" with both legs for some time now. Pt does endorse pain to both legs from the knees down through the feet and is unable to ambulate because of this. Pt denies any prior falls before yesterday.  "

## 2023-02-14 NOTE — ED NOTES
Adult Physical Assessment  LOC: Eva Bloom, 57 y.o. female verified via two identifiers.  The patient is awake, alert, oriented and speaking appropriately at this time.  APPEARANCE: Patient resting comfortably but does appear to be in pain  SKIN: Wound vac noted to left upper arm, port noted on chest. The rest of the skin is dry, intact, and appropriate skin color for ethnicity.  MUSCULOSKELETAL: Pt unable to ambulate due to tenderness and weakness in lower extremities. Pt moves upper extremities normally.  RESPIRATORY: Airway is open and patent, respirations are spontaneous, patient has a normal effort and rate, no accessory muscle use noted.  CARDIAC: Patient has a normal rate and rhythm. Pt is hypertensive.  ABDOMEN: Soft and non tender to palpation, no abdominal distention noted. Bowel sounds present in all four quadrants.  NEUROLOGIC: Eyes open spontaneously, behavior appropriate to situation, follows commands, facial expression symmetrical, bilateral hand grasp equal and even, purposeful motor response noted, normal sensation in all extremities when touched with a finger.

## 2023-02-14 NOTE — DISCHARGE INSTRUCTIONS
Please follow-up with orthopedics or podiatry in the next 7-10 days for recheck of symptoms.  You may ice the affected area.  You may bear weight on your legs and feet as tolerated.  Use postop shoe and crutches for comfort.  Return if you get worse or if new symptoms develop such as severe worsening of pain.

## 2023-02-14 NOTE — ED PROVIDER NOTES
"Encounter Date: 2/14/2023    SCRIBE #1 NOTE: I, Pam Hale, am scribing for, and in the presence of,  Rasta Garsia Jr., MD. I have scribed the following portions of the note - Other sections scribed: HPI, ROS.     History     Chief Complaint   Patient presents with    Fall     Pt reports to the ED with C/O fall x 1 yesterday. Pt reports " I was going up a step to get into my house and my left leg lost its balance and I fell." Pt reports bi lateral foot pain from the fall. Pt is a hemodialysis pt and received treatment this AM. Pt has not missed any dialysis recently. Pt also has a wound vac to the AV fistula on the left upper arm. Pt denies hitting head or any LOC. Pt place in [facility] bed for eval.      CC: bilateral knee pain    HPI: This is a 57 y.o.female patient, with a PMHx of Anemia, Kidney Cancer, CHF, HTN, Prediabetes, Hemodialysis, presenting to the ED for further evaluation of bilateral knee, ankle and feet pain beginning s/p fall that occurred yesterday. Patient reports trip and fall and states, " I was going up a step to get into my house and my left leg lost its balance". Patient denies any associated numbness or tingling. Patient denies any loss of consciousness or head trauma. Patient states she took Tylenol at 4:00 AM today to help alleviate the pain. Patient reports she is unable to ambulate due to the excruciating pain. Patient denies any fever, chills, shortness of breath, chest pain, neck pain, back pain, hip pain, abdominal pain, rash, nausea, vomiting, diarrhea, dysuria, or any other associated symptoms. No alleviating factors.     The history is provided by the patient. No  was used.   Review of patient's allergies indicates:   Allergen Reactions    Coreg [carvedilol] Other (See Comments)     Nausea/vomiting    Allopurinol      Other reaction(s): abnormal transaminases     Past Medical History:   Diagnosis Date    Anemia     Bronchitis 03/01/2017    Cancer 2016    kidney " cancer    CHF (congestive heart failure), NYHA class II, chronic, systolic     CMV (cytomegalovirus) antibody positive     Essential hypertension 9/23/2015    H/O herpes simplex type 2 infection     Herpes simplex type 1 antibody positive     History of kidney cancer     s/p left nephrectomy 1/2016    Hyperparathyroidism, unspecified     Hyperuricemia without signs of inflammatory arthritis and tophaceous disease     Kidney stones     LGSIL (low grade squamous intraepithelial dysplasia)     Myocardiopathy 7/21/2017    Prediabetes     Proteinuria     Renal disorder     Thyroid nodule     Urate nephropathy      Past Surgical History:   Procedure Laterality Date    BREAST CYST EXCISION      COLONOSCOPY N/A 11/12/2015    COLONOSCOPY N/A 3/12/2021    Procedure: COLONOSCOPY;  Surgeon: Brendon Lanier MD;  Location: [facility] ENDO;  Service: Endoscopy;  Laterality: N/A;  covid test 3/9, labs prior, prep instr mailed -ml    INSERTION OF BIVENTRICULAR IMPLANTABLE CARDIOVERTER-DEFIBRILLATOR (ICD)  04/2021    INSERTION OF TUNNELED CENTRAL VENOUS CATHETER (CVC) WITH SUBCUTANEOUS PORT N/A 1/25/2023    Procedure: INSERTION, DUAL LUMEN CATHETER WITH PORT, WITH IMAGING GUIDANCE;  Surgeon: FARIDEH Muñoz III, MD;  Location: [facility] OR [address] [place] [address];  Service: Peripheral Vascular;  Laterality: N/A;  possinble permcath placment    NEPHRECTOMY-LAPAROSCOPIC Left 01/12/2016    PERITONEAL CATHETER INSERTION      Permacath insertion  01/12/2017    PLACEMENT OF ARTERIOVENOUS GRAFT  12/28/2022    Procedure: INSERTION, GRAFT, ARTERIOVENOUS;  Surgeon: FARIDEH Muñoz III, MD;  [facility];  Service: Peripheral Vascular;;    REMOVAL, GRAFT, ARTERIOVENOUS, UPPER EXTREMITY Left 1/25/2023    Procedure: REMOVAL, GRAFT, ARTERIOVENOUS, UPPER EXTREMITY;  Surgeon: FARIDEH Muñoz III, MD;  Location: [facility] OR [address] [place] [address];  Service: Peripheral Vascular;  Laterality: Left;    REVISION OF PROCEDURE INVOLVING ARTERIOVENOUS GRAFT Left 12/28/2022     Procedure: REVISION, PROCEDURE INVOLVING ARTERIOVENOUS GRAFT;  Surgeon: FARIDEH Muñoz III, MD;  Location: [facility] OR [address] [place] [address];  Service: Peripheral Vascular;  Laterality: Left;    SALPINGOOPHORECTOMY Right 2016    KJB---DAVINCI    TONSILLECTOMY      TUBAL LIGATION       Family History   Problem Relation Age of Onset    Hypertension Mother     Diabetes Father     Kidney disease Father     No Known Problems Son     No Known Problems Son     Diabetes Maternal Grandfather     No Known Problems Sister     No Known Problems Brother     No Known Problems Maternal Grandmother     No Known Problems Paternal Grandmother     No Known Problems Paternal Grandfather     Heart disease Sister     No Known Problems Sister     No Known Problems Brother     No Known Problems Maternal Aunt     No Known Problems Maternal Uncle     No Known Problems Paternal Aunt     No Known Problems Paternal Uncle     Breast cancer Neg Hx     Colon cancer Neg Hx     Cancer Neg Hx     Stroke Neg Hx     Amblyopia Neg Hx     Blindness Neg Hx     Cataracts Neg Hx     Glaucoma Neg Hx     Macular degeneration Neg Hx     Retinal detachment Neg Hx     Strabismus Neg Hx     Thyroid disease Neg Hx      Social History     Tobacco Use    Smoking status: Never    Smokeless tobacco: Never   Substance Use Topics    Alcohol use: No     Comment: . 2 children. works at [employer].    Drug use: No     Review of Systems   Constitutional:  Negative for chills and fever.   HENT:  Negative for congestion, ear discharge, ear pain, rhinorrhea, sore throat and trouble swallowing.    Eyes:  Negative for visual disturbance.   Respiratory:  Negative for cough and shortness of breath.    Cardiovascular:  Negative for chest pain and leg swelling.   Gastrointestinal:  Negative for abdominal pain, diarrhea, nausea and vomiting.   Genitourinary:  Negative for dysuria.   Musculoskeletal:  Positive for arthralgias (bilateral knee, ankle and feet pain). Negative for back pain and neck  pain.   Skin:  Negative for rash.   Neurological:  Negative for weakness and headaches.   Psychiatric/Behavioral:  Negative for confusion.    All other systems reviewed and are negative.    Physical Exam     Initial Vitals [02/14/23 0959]   BP Pulse Resp Temp SpO2   (!) 156/71 88 18 97.6 °F (36.4 °C) 99 %      MAP       --         Physical Exam    Nursing note and vitals reviewed.  Constitutional: She appears well-developed and well-nourished. She is not diaphoretic. No distress.   HENT:   Head: Normocephalic and atraumatic.   Right Ear: External ear normal.   Left Ear: External ear normal.   Nose: Nose normal.   Mouth/Throat: Oropharynx is clear and moist.   Eyes: Conjunctivae and EOM are normal. Pupils are equal, round, and reactive to light. Right eye exhibits no discharge. Left eye exhibits no discharge. No scleral icterus.   Neck: Neck supple.   Normal range of motion.  Cardiovascular:  Normal rate, regular rhythm, normal heart sounds and intact distal pulses.           No murmur heard.  Pulmonary/Chest: Breath sounds normal. No respiratory distress. She has no wheezes. She has no rhonchi. She has no rales.   Abdominal: She exhibits no distension.   Musculoskeletal:         General: Tenderness and edema present.      Cervical back: Normal range of motion and neck supple.      Comments: Patient has bilateral swelling of the peripatellar area with discomfort.  No varus or valgus laxity.  Lachman's test negative.  There is also tenderness with medial and lateral malleolus palpation of bilateral distal lower extremities.  The lower extremities are well perfused with 2+ distal pulses.  No skin changes noted.  No open wounds.  There is also tenderness with palpation of the entire dorsum and plantar surface of the feet.      Neurological: She is alert and oriented to person, place, and time. She has normal strength. No cranial nerve deficit or sensory deficit.   Skin: Skin is warm and dry. No rash noted. No erythema.  No pallor.   Psychiatric: She has a normal mood and affect. Her behavior is normal. Judgment and thought content normal.       ED Course   Procedures  Labs Reviewed - No data to display       Imaging Results              CT Foot Without Contrast Right (Final result)  Result time 02/14/23 13:30:01      Final result by Cameron Pierce MD (02/14/23 13:30:01)                   Impression:      1. Allowing for motion artifact, no convincing acute displaced fracture or dislocation of the foot noting nonspecific edema and additional findings as described above.      Electronically signed by: Cameron Pierce MD  Date:    02/14/2023  Time:    13:30               Narrative:    EXAMINATION:  CT FOOT WITHOUT CONTRAST RIGHT    CLINICAL HISTORY:  Foot pain, stress fracture suspected, neg xray;    TECHNIQUE:  Axial images of the right foot were obtained at 0.625 mm intervals without administration of IV contrast.  Coronal and sagittal reformatted images were reviewed.    COMPARISON:  Radiograph 02/14/2023    FINDINGS:  There is motion artifact.    Allowing for motion artifact, no convincing acute displaced fracture or dislocation of the foot.  There is vascular calcification.  There is edema about the foot.  There are degenerative changes involving the dorsal aspect of the foot as well as of the calcaneus.                                       CT Foot Without Contrast Left (Final result)  Result time 02/14/23 13:24:09      Final result by Cameron Pierce MD (02/14/23 13:24:09)                   Impression:      1. No convincing acute displaced fracture or dislocation of the foot noting motion artifact.  There are degenerative changes of the foot as well as nonspecific edema.  Correlation is advised.      Electronically signed by: Cameron Pierce MD  Date:    02/14/2023  Time:    13:24               Narrative:    EXAMINATION:  CT FOOT WITHOUT CONTRAST LEFT    CLINICAL HISTORY:  Foot pain, stress fracture suspected, neg  xray;    TECHNIQUE:  Axial images of the left foot were obtained at 0.625 mm intervals without administration of IV contrast.  Coronal and sagittal reformatted images were reviewed.    COMPARISON:  Radiograph 02/14/2023    FINDINGS:  There is motion artifact.    There are a few prominent vascular channels involving the mid aspect of the talus, no convincing fracture.  No convincing acute displaced fracture or dislocation of the osseous structures of the foot.  There are degenerative changes along the dorsal aspect of the foot.  There is vascular calcification.  There is edema about the foot, particularly along the dorsal aspect.  No radiopaque foreign body.                                       X-Ray Knee 3 View Bilateral (Final result)  Result time 02/14/23 11:41:01      Final result by Edmond Queen MD (02/14/23 11:41:01)                   Impression:      No acute displaced fracture.      Electronically signed by: Edmond Queen MD  Date:    02/14/2023  Time:    11:41               Narrative:    EXAMINATION:  XR KNEE 3 VIEW BILATERAL    CLINICAL HISTORY:  Pain in unspecified knee.    TECHNIQUE:  AP, lateral, and Merchant views of both knees were performed.    COMPARISON:  None.    FINDINGS:  Bones are demineralized.  No acute displaced fracture.  No gross dislocation.  Small suprapatellar effusion, left side greater than right.  Age advanced atherosclerotic vascular calcifications.                                       X-Ray Foot Complete Bilateral (Final result)  Result time 02/14/23 11:44:06      Final result by Edmond Queen MD (02/14/23 11:44:06)                   Impression:      No acute displaced fracture.  Further evaluation/follow-up as clinically warranted if there is strong clinical concern for occult fracture.      Electronically signed by: Edmond Quene MD  Date:    02/14/2023  Time:    11:44               Narrative:    EXAMINATION:  XR FOOT COMPLETE 3 VIEW BILATERAL    CLINICAL  HISTORY:  Unspecified injury of unspecified foot, initial encounter    TECHNIQUE:  AP, lateral, and oblique views of both feet were performed.    COMPARISON:  None.    FINDINGS:  Patient's area of pain is not labeled on the study.    Left foot: Bones are demineralized.  No acute displaced fracture.  No gross dislocation.  Soft tissues are symmetric.  Age advanced atherosclerotic vascular calcifications are present.    Right foot: Bones are demineralized.  No acute displaced fracture.  No gross dislocation.  Soft tissues are symmetric.  Age advanced atherosclerotic vascular calcifications are present.                                       X-Ray Ankle Complete Bilateral (Final result)  Result time 02/14/23 11:45:34      Final result by Edmond Queen MD (02/14/23 11:45:34)                   Impression:      No acute displaced fracture.      Electronically signed by: Edmond Queen MD  Date:    02/14/2023  Time:    11:45               Narrative:    EXAMINATION:  XR ANKLE COMPLETE 3 VIEW BILATERAL    CLINICAL HISTORY:  Unspecified injury of unspecified ankle, initial encounter    TECHNIQUE:  AP, lateral and oblique views of both ankles were performed.    COMPARISON:  None    FINDINGS:  Left ankle: Bones are demineralized.  No acute displaced fracture.  No dislocation.  Soft tissues are symmetric.  Age advanced atherosclerotic vascular calcifications are present.    Right ankle: Bones are demineralized.  No acute displaced fracture.  No dislocation.  Soft tissues are symmetric.  Age advanced atherosclerotic vascular calcifications are present.                                    X-Rays:   Independently Interpreted Readings:   Other Readings:  X-rays of the patient's bilateral feet, bilateral knees, bilateral ankles all reveal no evidence of acute bony abnormality.  Medications   HYDROmorphone (PF) injection 1 mg (1 mg Intramuscular Given 2/14/23 1045)     Medical Decision Making:   History:   Old Medical Records: I  decided to obtain old medical records.  Independently Interpreted Test(s):   I have ordered and independently interpreted X-rays - see prior notes.  Clinical Tests:   The following lab test(s) were unremarkable: CBC, CMP, Lactate and Urinalysis  Radiological Study: Ordered and Reviewed  ED Management:  This is the emergent evaluation of a 57-year-old female who presents emergency department for evaluation of pain to her bilateral feet and bilateral knees after a fall from standing after missing a step at home last night.  No other injuries reported.  Differential diagnosis at the time of initial evaluation included, but was not limited to:  Sprain, strain, contusion, fracture.       There was no ligamentous laxity noted on evaluation of bilateral knees.  There was small effusion noted on left knee x-ray.  Otherwise, no acute bony abnormalities.  No acute bony abnormalities noted on the ankle or foot x-rays as well.  Given that the patient was having such significant symptoms and stated she could not bear weight on lower extremities, I did get a CT scan of her feet bilaterally.  These did not reveal occult fracture.  I think this patient is stable for discharge.  I discussed possible walking boots or postop shoes.  She prefers postop shoes with crutches.  I will have her follow up with Orthopedics or Podiatry depending on whom she can get into earlier for an outpatient appointment in the next 7-10 days.  Advised return for any new or worsening symptoms, such as severe worsening uncontrolled pain despite medication use.  Short course of opioid pain medications prescribed.  Patient unable to take NSAIDs due to renal disease.        Scribe Attestation:   Scribe #1: I performed the above scribed service and the documentation accurately describes the services I performed. I attest to the accuracy of the note.                   Clinical Impression:   Final diagnoses:  [M25.569] Knee pain  [S99.929A] Foot injury  [S99.919A]  Ankle injury  [S83.91XA, S83.92XA] Knee sprain, bilateral (Primary)  [S93.609A] Sprain of foot, unspecified laterality, initial encounter  [S93.409A] Sprain of ankle, unspecified laterality, unspecified ligament, initial encounter        ED Disposition Condition    Discharge Stable          ED Prescriptions       Medication Sig Dispense Start Date End Date Auth. Provider    oxyCODONE (OXY-IR) 5 mg Cap Take 1 capsule (5 mg total) by mouth every 6 (six) hours as needed for Pain. 12 capsule 2/14/2023 -- Rasta Garsia Jr., MD    naloxone (NARCAN) 1 mg/mL injection 2 mg (1 mg per nostril) by Nasal route as needed for opioid overdose; may repeat in 3 to 5 minutes if not effective. Call 911 2 mL 2/14/2023 -- Rasta Garsia Jr., MD    polyethylene glycol (GLYCOLAX) 17 gram/dose powder Take 17 g by mouth once daily. 235 g 2/14/2023 -- Rasta Garsia Jr., MD          Follow-up Information       Follow up With Specialties Details Why Contact Info    Padmini Gutierrez III, MD Orthopedic Surgery Schedule an appointment as soon as possible for a visit in 10 days  [address] [facility] [address]  [phone]      [facility] PODIATRY Podiatry Schedule an appointment as soon as possible for a visit in 10 days  [address] [facility] [address]  [phone]              I, Rasta Garsia MD, personally performed the services described in this documentation. All medical record entries made by the scribe were at my direction and in my presence. I have reviewed the chart and agree that the record reflects my personal performance and is accurate and complete.       Rasta Garsia Jr., MD  02/14/23 2928

## 2023-02-15 ENCOUNTER — TELEPHONE (OUTPATIENT)
Dept: FAMILY MEDICINE | Facility: CLINIC | Age: 58
End: 2023-02-15
Payer: COMMERCIAL

## 2023-02-15 NOTE — TELEPHONE ENCOUNTER
Called company and advised them they need to get orders from vascular surgery who is handling her wound.

## 2023-02-15 NOTE — PROGRESS NOTES
"Subjective:       Patient ID: Eva Bloom is a 57 y.o. female.    Chief Complaint: RCC    HPI     57 y.o.female, with metastatic RCC, here for f/u.  Recent hospitalization for infected HD graft, has wound vac in place.      Pt is currently taking cabozantinib daily since December 2016. Pt is on ESRD with HD every TTS. Patient tolerating treatment extremely well with little to no side effects.     Today, she denies any mouth sores, nausea, vomiting, diarrhea, constipation,weight loss, chest pain, leg swelling, headache, dizziness, or mood changes.    ECOG 1. She is accompanied with her .    Oncologic History (From Chart and Patient):  Eva Bloom is a 57 y.o.female with ESRD on HD who was found to have two L renal masses. She underwent L lap nephrectomy on 1/12/16. 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 Margins were negative.  Shehealed from surgery well, but then developed a large ovarian mass.  This was removed by gyn along with multiple sites of metastatic disease in the pelvis.  Path was c/w recurrence of RCC.     11/20/16 Pelvic ultrasound reveals "Large heterogeneous cystic and solid mass which appears to arise from the right ovary with worrisome imaging features for malignancy.  Differential diagnosis includes malignant tumors such as serous or mucinous cystadenocarcinoma.  It is important to note that the patient is at risk for ovarian torsion due to the size of the mass.Multiple uterine fibroids are identified with the largest in the uterine fundus."    11/22/16 pathology reveals "FINAL PATHOLOGIC DIAGNOSIS-RIGHT OVARY AND FALLOPIAN TUBE, RIGHT SALPINGO-OOPHORECTOMY:  -Positive for malignancy, high grade carcinoma morphologically and immunohistochemically consistent with metastasis from the patient's known renal cell carcinoma"    Review of Systems   Constitutional:  " Positive for fatigue. Negative for activity change, appetite change, chills and fever.   HENT:  Negative for congestion, hearing loss, mouth sores, postnasal drip, sore throat, tinnitus and voice change.    Eyes:  Negative for pain and visual disturbance.   Respiratory:  Negative for cough, shortness of breath and wheezing.    Cardiovascular:  Negative for chest pain, palpitations and leg swelling.   Gastrointestinal:  Negative for abdominal pain, constipation, diarrhea, nausea and vomiting.   Endocrine: Negative for cold intolerance and heat intolerance.   Genitourinary:  Negative for difficulty urinating, dyspareunia, dysuria, frequency, menstrual problem, urgency, vaginal bleeding, vaginal discharge and vaginal pain.   Musculoskeletal:  Negative for arthralgias and myalgias.   Skin:  Negative for color change, rash and wound.   Allergic/Immunologic: Negative for environmental allergies and food allergies.   Neurological:  Negative for weakness, numbness and headaches.   Hematological:  Negative for adenopathy. Does not bruise/bleed easily.   Psychiatric/Behavioral:  Negative for agitation, confusion, hallucinations and sleep disturbance. The patient is not nervous/anxious.    All other systems reviewed and are negative.    Allergies:  Review of patient's allergies indicates:   Allergen Reactions    Allopurinol      Other reaction(s): abnormal transaminases       Medications:  Current Outpatient Medications   Medication Sig Dispense Refill    acetaminophen (TYLENOL) 500 MG tablet Take 500 mg by mouth every 6 (six) hours as needed for Pain.      apixaban (ELIQUIS) 2.5 mg Tab Take 1 tablet (2.5 mg total) by mouth 2 (two) times daily. 60 tablet 11    CABOMETYX 60 mg Tab TAKE ONE TABLET BY MOUTH ONCE DAILY AT THE SAME TIME ON AN EMPTY STOMACH AT LEAST 1 HOUR BEFORE OR 2 HOURS AFTER EATING. AVOID GRAPEFRUIT PRODUCTS 30 tablet 4    cloNIDine 0.3 mg/24 hr td ptwk (CATAPRES) 0.3 mg/24 hr Place 1 patch onto the skin every  7 days. 4 patch 11    epoetin david-epbx (RETACRIT INJ) Epoetin david - epbx (Retacrit)      gabapentin (NEURONTIN) 100 MG capsule Take 1 capsule (100 mg total) by mouth once daily. 30 capsule 11    hydrALAZINE (APRESOLINE) 100 MG tablet Take 1 tablet (100 mg total) by mouth every 12 (twelve) hours. 180 tablet 2    HYDROcodone-acetaminophen (NORCO) 5-325 mg per tablet Take 1 tablet by mouth every 4 (four) hours as needed for Pain. Causes drowsiness. Do not drive or operate machinery with this medication. Do not drink alcohol with this medication. Causes constipation. Take with stool softener. 10 tablet 0    lidocaine-prilocaine (EMLA) cream APPLY ATLEAST 30 MINUTES BEFORE TREATMENT 3 TIMES A WEEK 30 g 11    metoprolol succinate (TOPROL-XL) 100 MG 24 hr tablet Take 1/2 tablet (50mg) once daily 90 tablet 3    mv,Ca,min-folic acid-vit K1 (ONE-A-DAY WOMEN'S 50 PLUS) 400-20 mcg Tab Take 1 tablet by mouth once daily.      naloxone (NARCAN) 1 mg/mL injection 2 mg (1 mg per nostril) by Nasal route as needed for opioid overdose; may repeat in 3 to 5 minutes if not effective. Call 911 2 mL 11    oxyCODONE (OXY-IR) 5 mg Cap Take 1 capsule (5 mg total) by mouth every 6 (six) hours as needed for Pain. 12 capsule 0    polyethylene glycol (GLYCOLAX) 17 gram/dose powder Take 17 g by mouth once daily. 235 g 0    promethazine (PHENERGAN) 25 MG tablet Take 1 tablet (25 mg total) by mouth every 6 (six) hours as needed for Nausea. 15 tablet 0    sacubitriL-valsartan (ENTRESTO) 49-51 mg per tablet Take 1 tablet by mouth 2 (two) times daily. 60 tablet 11    sodium chloride 0.9% SolP 100 mL with iron sucrose 100 mg iron/5 mL Soln 100 mg 50 mg every Tues, Thurs, Sat.      vancomycin 125 mg/5 mL Soln Take 5 mL by mouth every 6 hours for 10 days, THEN 5 mL twice daily for 7 days, THEN 5 mL once daily for 7 days, THEN 5 mL every 3 days for 14 days. 450 mL 0    vancomycin 25 mg/mL solution Take 5 mL by mouth every 6 hours for 10 days, THEN 5 mL  twice daily for 7 days, THEN 5 mL once daily for 7 days, THEN 5 mL every 3 days for 14 days. DISCARD REMAINDER. 360 mL 0     No current facility-administered medications for this visit.       PMH:  Past Medical History:   Diagnosis Date    Anemia     Bronchitis 03/01/2017    Cancer 2016    kidney cancer    CHF (congestive heart failure), NYHA class II, chronic, systolic     CMV (cytomegalovirus) antibody positive     Essential hypertension 9/23/2015    H/O herpes simplex type 2 infection     Herpes simplex type 1 antibody positive     History of kidney cancer     s/p left nephrectomy 1/2016    Hyperparathyroidism, unspecified     Hyperuricemia without signs of inflammatory arthritis and tophaceous disease     Kidney stones     LGSIL (low grade squamous intraepithelial dysplasia)     Myocardiopathy 7/21/2017    Prediabetes     Proteinuria     Renal disorder     Thyroid nodule     Urate nephropathy        PSH:  Past Surgical History:   Procedure Laterality Date    BREAST CYST EXCISION      COLONOSCOPY N/A 11/12/2015    COLONOSCOPY N/A 3/12/2021    Procedure: COLONOSCOPY;  Surgeon: Brendon Lanier MD;  Location: [facility];  Service: Endoscopy;  Laterality: N/A;  covid test 3/9, labs prior, prep instr mailed -ml    INSERTION OF BIVENTRICULAR IMPLANTABLE CARDIOVERTER-DEFIBRILLATOR (ICD)  04/2021    INSERTION OF TUNNELED CENTRAL VENOUS CATHETER (CVC) WITH SUBCUTANEOUS PORT N/A 1/25/2023    Procedure: INSERTION, DUAL LUMEN CATHETER WITH PORT, WITH IMAGING GUIDANCE;  Surgeon: FARIDEH Muñoz III, MD;  Location: [facility] OR [address] [place] [address];  Service: Peripheral Vascular;  Laterality: N/A;  possinble permcath placment    NEPHRECTOMY-LAPAROSCOPIC Left 01/12/2016    PERITONEAL CATHETER INSERTION      Permacath insertion  01/12/2017    PLACEMENT OF ARTERIOVENOUS GRAFT  12/28/2022    Procedure: INSERTION, GRAFT, ARTERIOVENOUS;  Surgeon: FARIDEH Muñoz III, MD;  Location: [facility] OR [address] [place] [address];  Service: Peripheral Vascular;;    REMOVAL,  GRAFT, ARTERIOVENOUS, UPPER EXTREMITY Left 1/25/2023    Procedure: REMOVAL, GRAFT, ARTERIOVENOUS, UPPER EXTREMITY;  Surgeon: FARIDEH Muñoz III, MD;  Location: [facility] OR [address] [place] [address];  Service: Peripheral Vascular;  Laterality: Left;    REVISION OF PROCEDURE INVOLVING ARTERIOVENOUS GRAFT Left 12/28/2022    Procedure: REVISION, PROCEDURE INVOLVING ARTERIOVENOUS GRAFT;  Surgeon: FARIDEH Muñoz III, MD;  [facility];  Service: Peripheral Vascular;  Laterality: Left;    SALPINGOOPHORECTOMY Right 2016    KJB---DAVINCI    TONSILLECTOMY      TUBAL LIGATION         FamHx:  Family History   Problem Relation Age of Onset    Hypertension Mother     Diabetes Father     Kidney disease Father     No Known Problems Son     No Known Problems Son     Diabetes Maternal Grandfather     No Known Problems Sister     No Known Problems Brother     No Known Problems Maternal Grandmother     No Known Problems Paternal Grandmother     No Known Problems Paternal Grandfather     Heart disease Sister     No Known Problems Sister     No Known Problems Brother     No Known Problems Maternal Aunt     No Known Problems Maternal Uncle     No Known Problems Paternal Aunt     No Known Problems Paternal Uncle     Breast cancer Neg Hx     Colon cancer Neg Hx     Cancer Neg Hx     Stroke Neg Hx     Amblyopia Neg Hx     Blindness Neg Hx     Cataracts Neg Hx     Glaucoma Neg Hx     Macular degeneration Neg Hx     Retinal detachment Neg Hx     Strabismus Neg Hx     Thyroid disease Neg Hx        SocHx:  Social History     Socioeconomic History    Marital status:     Number of children: 2   Occupational History    Occupation: lucero     Employer: [id number]   Tobacco Use    Smoking status: Never    Smokeless tobacco: Never   Substance and Sexual Activity    Alcohol use: No     Comment: . 2 children. works at [employer].    Drug use: No    Sexual activity: Yes     Partners: Male     Social Determinants of Health     Financial  Resource Strain: Low Risk     Difficulty of Paying Living Expenses: Not hard at all   Food Insecurity: No Food Insecurity    Worried About Running Out of Food in the Last Year: Never true    Ran Out of Food in the Last Year: Never true   Transportation Needs: No Transportation Needs    Lack of Transportation (Medical): No    Lack of Transportation (Non-Medical): No   Physical Activity: Insufficiently Active    Days of Exercise per Week: 3 days    Minutes of Exercise per Session: 30 min   Stress: No Stress Concern Present    Feeling of Stress : Not at all   Social Connections: Socially Integrated    Frequency of Communication with Friends and Family: More than three times a week    Frequency of Social Gatherings with Friends and Family: Once a week    Attends Pentec[employer] Services: 1 to 4 times per year    Active Member of Clubs or Organizations: Yes    Attends Club or Organization Meetings: 1 to 4 times per year    Marital Status:    Housing Stability: Low Risk     Unable to Pay for Housing in the Last Year: No    Number of Places Lived in the Last Year: 1    Unstable Housing in the Last Year: No         Objective:     Vitals:    02/16/23 1522   BP: 128/72   Pulse: 77   Resp: 18   Temp: 98.3 °F (36.8 °C)       Physical Exam  Vitals and nursing note reviewed.   Constitutional:       General: She is not in acute distress.     Appearance: She is well-developed.      Comments: Thin appearance   HENT:      Head: Normocephalic and atraumatic.      Nose: Nose normal.      Mouth/Throat:      Pharynx: No oropharyngeal exudate.   Eyes:      General:         Right eye: No discharge.         Left eye: No discharge.      Conjunctiva/sclera: Conjunctivae normal.      Pupils: Pupils are equal, round, and reactive to light.   Neck:      Trachea: No tracheal deviation.   Cardiovascular:      Comments: No swelling to bilateral lower extremities.   Musculoskeletal:         General: No tenderness. Normal range of motion.       Comments:      Skin:     General: Skin is warm and dry.      Coloration: Skin is not pale.      Findings: No erythema or rash.   Neurological:      Mental Status: She is alert and oriented to person, place, and time.   Psychiatric:         Behavior: Behavior normal.         Thought Content: Thought content normal.      LABS:  WBC   Date Value Ref Range Status   01/30/2023 6.02 3.90 - 12.70 K/uL Final     Hemoglobin   Date Value Ref Range Status   01/30/2023 9.5 (L) 12.0 - 16.0 g/dL Final     Hematocrit   Date Value Ref Range Status   01/30/2023 31.8 (L) 37.0 - 48.5 % Final     Platelets   Date Value Ref Range Status   01/30/2023 134 (L) 150 - 450 K/uL Final       Chemistry        Component Value Date/Time     01/30/2023 0319    K 3.5 01/30/2023 0319     01/30/2023 0319    CO2 21 (L) 01/30/2023 0319    BUN 45 (H) 01/30/2023 0319    CREATININE 5.1 (H) 01/30/2023 0319     01/30/2023 0319        Component Value Date/Time    CALCIUM 8.2 (L) 01/30/2023 0319    ALKPHOS 163 (H) 12/19/2022 2325    AST 88 (H) 12/19/2022 2325    ALT 27 12/19/2022 2325    BILITOT 0.7 12/19/2022 2325        CT Foot Without Contrast Right  Narrative: EXAMINATION:  CT FOOT WITHOUT CONTRAST RIGHT    CLINICAL HISTORY:  Foot pain, stress fracture suspected, neg xray;    TECHNIQUE:  Axial images of the right foot were obtained at 0.625 mm intervals without administration of IV contrast.  Coronal and sagittal reformatted images were reviewed.    COMPARISON:  Radiograph 02/14/2023    FINDINGS:  There is motion artifact.    Allowing for motion artifact, no convincing acute displaced fracture or dislocation of the foot.  There is vascular calcification.  There is edema about the foot.  There are degenerative changes involving the dorsal aspect of the foot as well as of the calcaneus.  Impression: 1. Allowing for motion artifact, no convincing acute displaced fracture or dislocation of the foot noting nonspecific edema and additional  findings as described above.    Electronically signed by: Cameron Pierce MD  Date:    02/14/2023  Time:    13:30  CT Foot Without Contrast Left  Narrative: EXAMINATION:  CT FOOT WITHOUT CONTRAST LEFT    CLINICAL HISTORY:  Foot pain, stress fracture suspected, neg xray;    TECHNIQUE:  Axial images of the left foot were obtained at 0.625 mm intervals without administration of IV contrast.  Coronal and sagittal reformatted images were reviewed.    COMPARISON:  Radiograph 02/14/2023    FINDINGS:  There is motion artifact.    There are a few prominent vascular channels involving the mid aspect of the talus, no convincing fracture.  No convincing acute displaced fracture or dislocation of the osseous structures of the foot.  There are degenerative changes along the dorsal aspect of the foot.  There is vascular calcification.  There is edema about the foot, particularly along the dorsal aspect.  No radiopaque foreign body.  Impression: 1. No convincing acute displaced fracture or dislocation of the foot noting motion artifact.  There are degenerative changes of the foot as well as nonspecific edema.  Correlation is advised.    Electronically signed by: Cameron Pierce MD  Date:    02/14/2023  Time:    13:24  X-Ray Ankle Complete Bilateral  Narrative: EXAMINATION:  XR ANKLE COMPLETE 3 VIEW BILATERAL    CLINICAL HISTORY:  Unspecified injury of unspecified ankle, initial encounter    TECHNIQUE:  AP, lateral and oblique views of both ankles were performed.    COMPARISON:  None    FINDINGS:  Left ankle: Bones are demineralized.  No acute displaced fracture.  No dislocation.  Soft tissues are symmetric.  Age advanced atherosclerotic vascular calcifications are present.    Right ankle: Bones are demineralized.  No acute displaced fracture.  No dislocation.  Soft tissues are symmetric.  Age advanced atherosclerotic vascular calcifications are present.  Impression: No acute displaced fracture.    Electronically signed by: Edmond  MD Bret  Date:    02/14/2023  Time:    11:45  X-Ray Foot Complete Bilateral  Narrative: EXAMINATION:  XR FOOT COMPLETE 3 VIEW BILATERAL    CLINICAL HISTORY:  Unspecified injury of unspecified foot, initial encounter    TECHNIQUE:  AP, lateral, and oblique views of both feet were performed.    COMPARISON:  None.    FINDINGS:  Patient's area of pain is not labeled on the study.    Left foot: Bones are demineralized.  No acute displaced fracture.  No gross dislocation.  Soft tissues are symmetric.  Age advanced atherosclerotic vascular calcifications are present.    Right foot: Bones are demineralized.  No acute displaced fracture.  No gross dislocation.  Soft tissues are symmetric.  Age advanced atherosclerotic vascular calcifications are present.  Impression: No acute displaced fracture.  Further evaluation/follow-up as clinically warranted if there is strong clinical concern for occult fracture.    Electronically signed by: Edmond Queen MD  Date:    02/14/2023  Time:    11:44  X-Ray Knee 3 View Bilateral  Narrative: EXAMINATION:  XR KNEE 3 VIEW BILATERAL    CLINICAL HISTORY:  Pain in unspecified knee.    TECHNIQUE:  AP, lateral, and Merchant views of both knees were performed.    COMPARISON:  None.    FINDINGS:  Bones are demineralized.  No acute displaced fracture.  No gross dislocation.  Small suprapatellar effusion, left side greater than right.  Age advanced atherosclerotic vascular calcifications.  Impression: No acute displaced fracture.    Electronically signed by: Edmond Queen MD  Date:    02/14/2023  Time:    11:41      Assessment:       1. Metastatic renal cell carcinoma, unspecified laterality              Plan:       Metastatic Renal Cell Carcinoma:    It is unlikely that surgery removed all sites of metastatic disease. Given that this is c/w her previous papillary disease, pt started on oral cabozantinib 60mg daily (for dual VEGF and MET inhibition), especially given recent data that shows  cabo is far superior to student in the first line setting.  If pt progresses on this regimen, will switch to nivolumab.     RTC in 6 wks labs at [facility] (CBC,CMP, TSH) and CT CAP on [facility] as well and to see me.  ALL appts MUST be on a Monday, Wednesday, or Friday ONLY (pt has dialysis on Tuesday and Thursday).  Pt prefers early morning appts.       Patient is in agreement with the proposed treatment plan. All questions were answered to the patient's satisfaction. Pt knows to call clinic if anything is needed before the next clinic visit.     More than 40 mins total time were spent during this encounter.      Ben Rivera M.D., M.S., F.A.C.P.  Hematology/Oncology Attending  [facility]           Route Chart for Scheduling    Med Onc Chart Routing      Follow up with physician . RTC in 6 wks labs at [facility] (CBC,CMP, TSH) and CT CAP on [facility] as well and to see me.  ALL appts MUST be on a Monday, Wednesday, or Friday ONLY (pt has dialysis on Tuesday and Thursday).  Pt prefers early morning appts.   Follow up with MARTA    Infusion scheduling note    Injection scheduling note    Labs CBC, CMP and TSH   Lab interval:     Imaging CT chest abdomen pelvis      Pharmacy appointment    Other referrals

## 2023-02-15 NOTE — TELEPHONE ENCOUNTER
----- Message from Kaushik Tapia sent at 2/15/2023 11:09 AM CST -----  Regarding: Valentina FERREIRA- EXT: [phone]  Type: Patient Call Back     Who called:Valentina FERREIRA    What is the request in detail: rg orders wound vac therapy    Can the clinic reply by ALESSANDRASLOUIE? no    Would the patient rather a call back or a response via [facility]? Call back    Best call back number:  EXT: [phone]    Additional Information:if staff received orders, please sign and fax to ..[phone]

## 2023-02-16 ENCOUNTER — OFFICE VISIT (OUTPATIENT)
Dept: INFECTIOUS DISEASES | Facility: CLINIC | Age: 58
End: 2023-02-16
Payer: MEDICARE

## 2023-02-16 ENCOUNTER — OFFICE VISIT (OUTPATIENT)
Dept: HEMATOLOGY/ONCOLOGY | Facility: CLINIC | Age: 58
End: 2023-02-16
Payer: MEDICARE

## 2023-02-16 VITALS
DIASTOLIC BLOOD PRESSURE: 72 MMHG | WEIGHT: 99.44 LBS | SYSTOLIC BLOOD PRESSURE: 128 MMHG | BODY MASS INDEX: 16.98 KG/M2 | HEART RATE: 77 BPM | TEMPERATURE: 98 F | HEIGHT: 64 IN

## 2023-02-16 VITALS
WEIGHT: 99.44 LBS | SYSTOLIC BLOOD PRESSURE: 128 MMHG | RESPIRATION RATE: 18 BRPM | BODY MASS INDEX: 16.98 KG/M2 | OXYGEN SATURATION: 99 % | DIASTOLIC BLOOD PRESSURE: 72 MMHG | TEMPERATURE: 98 F | HEIGHT: 64 IN | HEART RATE: 77 BPM

## 2023-02-16 DIAGNOSIS — A04.72 C. DIFFICILE DIARRHEA: ICD-10-CM

## 2023-02-16 DIAGNOSIS — T82.7XXD VASCULAR GRAFT INFECTION, SUBSEQUENT ENCOUNTER: Primary | ICD-10-CM

## 2023-02-16 DIAGNOSIS — C64.9 METASTATIC RENAL CELL CARCINOMA, UNSPECIFIED LATERALITY: Primary | ICD-10-CM

## 2023-02-16 PROCEDURE — 99999 PR PBB SHADOW E&M-EST. PATIENT-LVL IV: CPT | Mod: PBBFAC,,, | Performed by: INTERNAL MEDICINE

## 2023-02-16 PROCEDURE — 99214 OFFICE O/P EST MOD 30 MIN: CPT | Mod: PBBFAC | Performed by: INTERNAL MEDICINE

## 2023-02-16 PROCEDURE — 99215 PR OFFICE/OUTPT VISIT, EST, LEVL V, 40-54 MIN: ICD-10-PCS | Mod: S$PBB,,, | Performed by: INTERNAL MEDICINE

## 2023-02-16 PROCEDURE — 99214 PR OFFICE/OUTPT VISIT, EST, LEVL IV, 30-39 MIN: ICD-10-PCS | Mod: S$PBB,,, | Performed by: INTERNAL MEDICINE

## 2023-02-16 PROCEDURE — 99999 PR PBB SHADOW E&M-EST. PATIENT-LVL IV: ICD-10-PCS | Mod: PBBFAC,,, | Performed by: INTERNAL MEDICINE

## 2023-02-16 PROCEDURE — 99215 OFFICE O/P EST HI 40 MIN: CPT | Mod: S$PBB,,, | Performed by: INTERNAL MEDICINE

## 2023-02-16 PROCEDURE — 99214 OFFICE O/P EST MOD 30 MIN: CPT | Mod: PBBFAC,27 | Performed by: INTERNAL MEDICINE

## 2023-02-16 PROCEDURE — 99214 OFFICE O/P EST MOD 30 MIN: CPT | Mod: S$PBB,,, | Performed by: INTERNAL MEDICINE

## 2023-02-16 RX ORDER — VANCOMYCIN HYDROCHLORIDE 25 MG/ML
KIT ORAL
Qty: 420 ML | Refills: 0 | Status: SHIPPED | OUTPATIENT
Start: 2023-02-16 | End: 2023-02-16

## 2023-02-16 NOTE — PROGRESS NOTES
Subjective:      Patient ID: Eva Bloom is a 57 y.o. female.    Chief Complaint:Follow-up      History of Present Illness          Ms. Bloom is a 57 y.o. F PMHx of ESRD on HD (LUE AVF), RCC (s/p L nephrectomy in 2016), HFrEF (EF 25%) s/p AICD, Afib on Eliquis and placement of a new LUE graft in mid-December of 2022 (with retention of the old graft in her LUE).  She was admitted to the hospital and the old graft was excised on 1/25/23.  Cultures were positive for staph epidermidis.  A wound vac was placed.  She was discharged to complete a 6 week course of IV cefazolin to be dosed with dialysis.  Her hospital course was complicated by development of C diff.  She was started on oral vancomycin and her symptoms are a bit better.  She is here today for follow up.  She has dialysis tue/thur/sat.  Home health sees her on mon/wed/fri.      Review of Systems   Constitutional: Negative for chills, decreased appetite, fever, malaise/fatigue, night sweats, weight gain and weight loss.   HENT:  Negative for congestion, ear pain, hearing loss, hoarse voice, sore throat and tinnitus.    Eyes:  Negative for blurred vision, redness and visual disturbance.   Cardiovascular:  Negative for chest pain, leg swelling and palpitations.   Respiratory:  Negative for cough, hemoptysis, shortness of breath, sputum production and wheezing.    Hematologic/Lymphatic: Negative for adenopathy. Does not bruise/bleed easily.   Skin:  Negative for dry skin, itching, rash and suspicious lesions.   Musculoskeletal:  Negative for back pain, joint pain, myalgias and neck pain.   Gastrointestinal:  Negative for abdominal pain, constipation, diarrhea, heartburn, nausea and vomiting.   Genitourinary:  Negative for dysuria, flank pain, frequency, hematuria, hesitancy and urgency.   Neurological:  Negative for dizziness, headaches, numbness, paresthesias and weakness.   Psychiatric/Behavioral:  Negative for depression and memory loss. The patient does  not have insomnia and is not nervous/anxious.    Objective:   Physical Exam  Vitals and nursing note reviewed.   Constitutional:       General: She is not in acute distress.     Appearance: She is well-developed. She is not diaphoretic.   HENT:      Head: Normocephalic and atraumatic.      Right Ear: External ear normal.      Left Ear: External ear normal.      Nose: Nose normal.      Mouth/Throat:      Pharynx: No oropharyngeal exudate.   Eyes:      General: No scleral icterus.        Right eye: No discharge.         Left eye: No discharge.      Conjunctiva/sclera: Conjunctivae normal.      Pupils: Pupils are equal, round, and reactive to light.   Neck:      Thyroid: No thyromegaly.      Vascular: No JVD.      Trachea: No tracheal deviation.   Cardiovascular:      Rate and Rhythm: Normal rate and regular rhythm.      Heart sounds: No murmur heard.    No friction rub. No gallop.   Pulmonary:      Effort: Pulmonary effort is normal. No respiratory distress.      Breath sounds: Normal breath sounds. No stridor. No wheezing or rales.   Chest:      Chest wall: No tenderness.   Abdominal:      General: Bowel sounds are normal. There is no distension.      Palpations: Abdomen is soft. There is no mass.      Tenderness: There is no abdominal tenderness. There is no guarding or rebound.   Musculoskeletal:         General: No tenderness. Normal range of motion.      Cervical back: Normal range of motion and neck supple.      Comments: Wound vac covering left upper arm wound   Lymphadenopathy:      Cervical: No cervical adenopathy.   Skin:     General: Skin is warm.      Coloration: Skin is not pale.      Findings: No erythema or rash.   Neurological:      Mental Status: She is alert and oriented to person, place, and time.      Cranial Nerves: No cranial nerve deficit.      Motor: No abnormal muscle tone.      Coordination: Coordination normal.      Deep Tendon Reflexes: Reflexes normal.     Assessment:       1. Vascular  graft infection, subsequent encounter :  Available labs reviewed.  Patient is to continue on IV cefazolin for the planned 6 weeks.   2. C. difficile diarrhea :  Will re-start treatment dose oral vancomycin as she is having diarrhea again.         Plan:       Vascular graft infection, subsequent encounter    C. difficile diarrhea  -     Discontinue: vancomycin (FIRVANQ) 25 mg/mL SolR; Take 5 mLs (125 mg total) by mouth every 6 (six) hours for 14 days, THEN 5 mLs (125 mg total) every 12 (twelve) hours for 14 days.  Dispense: 420 mL; Refill: 0  -     vancomycin 125 mg/5 mL Soln; Take 5 mLs (125 mg total) by mouth every 6 (six) hours for 14 days, THEN 5 mLs (125 mg total) every 12 (twelve) hours for 14 days.  Dispense: 420 mL; Refill: 0

## 2023-02-16 NOTE — PROGRESS NOTES
Endocrine Surgery History & Physical     REFERRING PROVIDER: Eliza Allan*  (Previously referred by Edson Murdock MD, she is no longer the patient's current nephrologist)    REASON FOR VISIT: longstanding secondary hyperparathyroidism    NEPHROLOGIST: Claude Childers MD; Dialysis schedule: [facility] at [facility] dialysis center.  ENDOCRINOLOGIST: Carlos Parker MD  CARDIOLOGIST: To Campbell MD    HPI: Eva Bloom is a 57 y.o. female patient with a history notable for ESRD, CHF (EF 20%), a. Fib on eliquis, metastatic renal cell carcinoma history who presents in Endocrine Surgery consultation for consideration of parathyroidectomy for treatment of longstanding secondary hyperparathyroidism.    Recent laboratory studies:  Parathyroid hormone levels: 4541  Calcium: 8.3, corrects to 9.4 for an albumin of 2.6  Phosphorus: 6.2  Vitamin D: 65.4 in Jan 2023    Medications:  Calcimimetics: no cinacalcet   Phosphorus binders: none  Vitamin D analogues: Receives at her dialysis sessions does not remember name of it  Vitamin D supplementation: none  The patient is not on lithium, HCTZ.  Gets calcium supplements at dialysis.    Transplant status:  Not a candidate secondary to cancer recurrence    Symptoms:   The patient has not experienced the following symptoms: bone pain, joint pain, fractures, proximal muscle weakness, extraskeletal calcification, calciphylaxis, pruritis, uncontrolled hypercalcemia, uncontrolled hyperphosphatemia  The patient has experienced the following neurocognitive complaints: weakness and forgetfulness.   The patient has dysphagia, globus sensation, no anterior neck pain.  The patient has hoarseness.  No voice changes or increased need to clear the throat.  Bone mineral density: not done    Surgical risk factors:  Risk of concurrent thyroid disease has 4 thyroid lesions two of which meet criteria for biopsy  Last ultrasound of the thyroid 2/17/23   History of neck radiation: none  Prior neck  surgery: none, has had multiple prior central lines placed in the bilateral cervical regions  Cardiovascular risk: echo (EF 25% Oct 2022), PA pressure 60mmHg with pulmonary HTN  Antiplatelet therapy and anticoagulation: eliquis    Family history:  No family history of endocrinopathies or endocrine cancers including pituitary tumors, pancreatic neuroendocrine tumors, medullary thyroid cancer or pheochromocytoma/paraganglioma.     Localization studies done prior to this visit:  Ultrasound: none  Nuclear Medicine Parathyroid Scan: none  4D-CT Parathyroid scan: none    LABORATORY STUDIES:  I personally and independently reviewed relevant lab test results, including the following:    Hypocalcemia to normocalcemia on labs  Significantly elevated PTH  Hyperphosphatemia    PAST MEDICAL HISTORY:  Patient Active Problem List   Diagnosis    Kidney stones    Hyperparathyroidism, secondary renal    Hyperuricemia without signs of inflammatory arthritis and tophaceous disease    Proteinuria    Urate nephropathy    Essential hypertension    Pap smear abnormality of cervix with ASCUS favoring benign    CMV (cytomegalovirus) antibody positive    Herpes simplex type 1 antibody positive    H/O herpes simplex type 2 infection    Multinodular goiter    History of kidney cancer    S/p nephrectomy left    Primary insomnia    Cardiomyopathy, nonischemic    Pulmonary hypertension    ESRD (end stage renal disease)    Nonrheumatic mitral valve regurgitation    Chronic combined systolic and diastolic congestive heart failure    Anemia in ESRD (end-stage renal disease)    Chronic kidney disease-mineral and bone disorder    Hyperphosphatemia    ICD (implantable cardioverter-defibrillator) in place - SubQ    Right-sided epistaxis    Left-sided epistaxis    Hypercalcemia    Glucose intolerance    PAF (paroxysmal atrial fibrillation)    Transaminitis    Debility    Malfunction of arteriovenous dialysis fistula    Clostridium difficile infection     Arteriovenous graft infection         PAST SURGICAL HISTORY:  Past Surgical History:   Procedure Laterality Date    BREAST CYST EXCISION      COLONOSCOPY N/A 11/12/2015    COLONOSCOPY N/A 3/12/2021    Procedure: COLONOSCOPY;  Surgeon: Brendon Lanier MD;  Location: [facility] ENDO;  Service: Endoscopy;  Laterality: N/A;  covid test 3/9, labs prior, prep instr mailed -ml    INSERTION OF BIVENTRICULAR IMPLANTABLE CARDIOVERTER-DEFIBRILLATOR (ICD)  04/2021    INSERTION OF TUNNELED CENTRAL VENOUS CATHETER (CVC) WITH SUBCUTANEOUS PORT N/A 1/25/2023    Procedure: INSERTION, DUAL LUMEN CATHETER WITH PORT, WITH IMAGING GUIDANCE;  Surgeon: FARIDEH Muñoz III, MD;  Location: [facility] OR 2ND FLR;  Service: Peripheral Vascular;  Laterality: N/A;  possinble permcath placment    NEPHRECTOMY-LAPAROSCOPIC Left 01/12/2016    PERITONEAL CATHETER INSERTION      Permacath insertion  01/12/2017    PLACEMENT OF ARTERIOVENOUS GRAFT  12/28/2022    Procedure: INSERTION, GRAFT, ARTERIOVENOUS;  Surgeon: FARIDEH Muñoz III, MD;  Location: [facility] OR 2ND FLR;  Service: Peripheral Vascular;;    REMOVAL, GRAFT, ARTERIOVENOUS, UPPER EXTREMITY Left 1/25/2023    Procedure: REMOVAL, GRAFT, ARTERIOVENOUS, UPPER EXTREMITY;  Surgeon: FARIDEH Muñoz III, MD;  Location: [facility] OR 2ND FLR;  Service: Peripheral Vascular;  Laterality: Left;    REVISION OF PROCEDURE INVOLVING ARTERIOVENOUS GRAFT Left 12/28/2022    Procedure: REVISION, PROCEDURE INVOLVING ARTERIOVENOUS GRAFT;  Surgeon: FARIDEH Muñoz III, MD;  Location: [facility] OR 2ND FLR;  Service: Peripheral Vascular;  Laterality: Left;    SALPINGOOPHORECTOMY Right 2016    KJB---DAVINCI    TONSILLECTOMY      TUBAL LIGATION          MEDICATIONS:  Current Outpatient Medications   Medication Sig Dispense Refill    acetaminophen (TYLENOL) 500 MG tablet Take 500 mg by mouth every 6 (six) hours as needed for Pain.      apixaban (ELIQUIS) 2.5 mg Tab Take 1 tablet (2.5 mg total) by mouth 2 (two) times daily. 60 tablet 11     cloNIDine 0.3 mg/24 hr td ptwk (CATAPRES) 0.3 mg/24 hr Place 1 patch onto the skin every 7 days. 4 patch 11    epoetin david-epbx (RETACRIT INJ) Epoetin david - epbx (Retacrit)      gabapentin (NEURONTIN) 100 MG capsule Take 1 capsule (100 mg total) by mouth once daily. 30 capsule 11    HYDROcodone-acetaminophen (NORCO) 5-325 mg per tablet Take 1 tablet by mouth every 4 (four) hours as needed for Pain. Causes drowsiness. Do not drive or operate machinery with this medication. Do not drink alcohol with this medication. Causes constipation. Take with stool softener. 10 tablet 0    lidocaine-prilocaine (EMLA) cream APPLY ATLEAST 30 MINUTES BEFORE TREATMENT 3 TIMES A WEEK 30 g 11    metoprolol succinate (TOPROL-XL) 100 MG 24 hr tablet Take 1/2 tablet (50mg) once daily 90 tablet 3    mv,Ca,min-folic acid-vit K1 (ONE-A-DAY WOMEN'S 50 PLUS) 400-20 mcg Tab Take 1 tablet by mouth once daily.      naloxone (NARCAN) 1 mg/mL injection 2 mg (1 mg per nostril) by Nasal route as needed for opioid overdose; may repeat in 3 to 5 minutes if not effective. Call 911 2 mL 11    oxyCODONE (OXY-IR) 5 mg Cap Take 1 capsule (5 mg total) by mouth every 6 (six) hours as needed for Pain. 12 capsule 0    polyethylene glycol (GLYCOLAX) 17 gram/dose powder Take 17 g by mouth once daily. 235 g 0    promethazine (PHENERGAN) 25 MG tablet Take 1 tablet (25 mg total) by mouth every 6 (six) hours as needed for Nausea. 15 tablet 0    sacubitriL-valsartan (ENTRESTO) 49-51 mg per tablet Take 1 tablet by mouth 2 (two) times daily. 60 tablet 11    sodium chloride 0.9% SolP 100 mL with iron sucrose 100 mg iron/5 mL Soln 100 mg 50 mg every Tues, Thurs, Sat.      vancomycin 125 mg/5 mL Soln Take 5 mLs (125 mg total) by mouth every 6 (six) hours for 14 days, THEN 5 mLs (125 mg total) every 12 (twelve) hours for 14 days. 420 mL 0    cabozantinib (CABOMETYX) 60 mg Tab TAKE ONE TABLET BY MOUTH ONCE DAILY AT THE SAME TIME ON AN EMPTY STOMACH AT LEAST 1 HOUR BEFORE OR  2 HOURS AFTER EATING. AVOID GRAPEFRUIT PRODUCTS 30 tablet 4    hydrALAZINE (APRESOLINE) 100 MG tablet Take 1 tablet (100 mg total) by mouth every 12 (twelve) hours. 180 tablet 2     No current facility-administered medications for this visit.       ALLERGIES:  Review of patient's allergies indicates:   Allergen Reactions    Coreg [carvedilol] Other (See Comments)     Nausea/vomiting    Allopurinol      Other reaction(s): abnormal transaminases       SOCIAL HISTORY:  Social History     Socioeconomic History    Marital status:     Number of children: 2   Occupational History    Occupation: [employer]     Employer: [id number]   Tobacco Use    Smoking status: Never    Smokeless tobacco: Never   Substance and Sexual Activity    Alcohol use: No     Comment: . 2 children. works at [employer].    Drug use: No    Sexual activity: Yes     Partners: Male     Social Determinants of Health     Financial Resource Strain: Low Risk     Difficulty of Paying Living Expenses: Not hard at all   Food Insecurity: No Food Insecurity    Worried About Running Out of Food in the Last Year: Never true    Ran Out of Food in the Last Year: Never true   Transportation Needs: No Transportation Needs    Lack of Transportation (Medical): No    Lack of Transportation (Non-Medical): No   Physical Activity: Insufficiently Active    Days of Exercise per Week: 3 days    Minutes of Exercise per Session: 30 min   Stress: No Stress Concern Present    Feeling of Stress : Not at all   Social Connections: Socially Integrated    Frequency of Communication with Friends and Family: More than three times a week    Frequency of Social Gatherings with Friends and Family: Once a week    Attends Jehovah's witness Services: 1 to 4 times per year    Active Member of Clubs or Organizations: Yes    Attends Club or Organization Meetings: 1 to 4 times per year    Marital Status:    Housing Stability: Low Risk     Unable to Pay for Housing in the Last Year:  "No    Number of Places Lived in the Last Year: 1    Unstable Housing in the Last Year: No         FAMILY HISTORY:  Family History   Problem Relation Age of Onset    Hypertension Mother     Diabetes Father     Kidney disease Father     No Known Problems Son     No Known Problems Son     Diabetes Maternal Grandfather     No Known Problems Sister     No Known Problems Brother     No Known Problems Maternal Grandmother     No Known Problems Paternal Grandmother     No Known Problems Paternal Grandfather     Heart disease Sister     No Known Problems Sister     No Known Problems Brother     No Known Problems Maternal Aunt     No Known Problems Maternal Uncle     No Known Problems Paternal Aunt     No Known Problems Paternal Uncle     Breast cancer Neg Hx     Colon cancer Neg Hx     Cancer Neg Hx     Stroke Neg Hx     Amblyopia Neg Hx     Blindness Neg Hx     Cataracts Neg Hx     Glaucoma Neg Hx     Macular degeneration Neg Hx     Retinal detachment Neg Hx     Strabismus Neg Hx     Thyroid disease Neg Hx         REVIEW OF SYSTEMS:  A detailed review of systems has been reviewed with the patient, pertinent positives and negatives are presented in the note and is otherwise negative.    PHYSICAL EXAMINATION:  Vital Signs: BP (!) 100/58   Pulse 70   Ht 5' 4" (1.626 m)   Wt 44.9 kg (99 lb)   LMP 10/24/2016   SpO2 99%   BMI 16.99 kg/m²     Constitutional: frail and somnolent, cachectic  HENT: no lid lag, no exophthalmos, no scleral icterus, no carotid bruit, moist mucous membranes, normal dentition, well healed scars from prior bilateral central catheters  Neck: supple, trachea in midline, thyroid is midline and moves well with swallowing,  palpable and unremarkable, normal neck extension.  Left sided tunneled central line in place with dressing  Heme/Lymph: no cervical or supraclavicular lymphadenopathy  Respiratory: normal respiratory effort, no wheezes or stridor  Cardiovascular: regular rate and rhythm  Extremities: " no edema, wound vac over fistula excision site   Skin: warm and dry, no rashes  Neurologic: no resting tremor of outstretched hands, voice weak  Vascular: radial pulses palpable bilaterally  Psychiatric: affect normal    IMAGING STUDIES:  I personally and independently reviewed, visualized and interpreted the images of the below listed radiology studies (including US neck) and my findings are notable for multiple thyroid nodules two of which meet criteria for FNA and 4 parathyroid glands identified.  Reports below for reference.      US Soft Tissue Head Neck Thyroid 02/17/2023  The thyroid is normal in size.  Right lobe of the thyroid measures 5.4 x 1.8 x 1.8 cm.  Left lobe of the thyroid measures 4.0 x 1.5 x 1.7 cm.  The isthmus thickness measures 0.3 cm.  Heterogeneous background thyroid parenchyma.     Multiple thyroid nodules.   -0.9 x 0.8 x 0.8 cm nodule with shadowing peripheral calcification at the mid right lobe (TR 4).   -1.2 x 1.3 x 1.3 cm nodule with shadowing peripheral calcification at the lower right lobe (TR 4).   -1.0 x 1.0 x 1.2 cm nodule with shadowing macrocalcification at the right lower lobe (TR 4).   -0.8 x 0.5 x 0.7 cm isoechoic nodule with punctate echogenic foci at the mid left lobe (TR 4)     Two smaller (up to 0.6 cm) left thyroid nodules with macrocalcification which do not meet criteria for follow-up or FNA.     4 hypoechoic extra-thyroidal nodules at the expected location of the parathyroids:   -1.3 x 0.6 x 0.8 cm right superior nodule   -0.9 x 0.7 x 0.9 cm right lower nodule   -2.2 x 1.6 x 1.1 cm left superior nodule   -1.1 x 0.4 x 1.1 cm left nodule     Cervical lymph nodes demonstrate normal morphology and size.     Impression:   Multinodular thyroid with multiple calcified TR 4 nodules.  Follow-up thyroid ultrasound recommended in 1 year.   Bilateral enlarged parathyroid nodules which may represent parathyroid hyperplasia.    Transthoracic echo (TTE) complete  10/30/2022    Interpretation Summary  · The left ventricle is mildly enlarged with moderate concentric hypertrophy and severely decreased systolic function.  · The estimated ejection fraction is 25%.  · Grade II left ventricular diastolic dysfunction.  · Normal right ventricular size with normal right ventricular systolic function.  · Moderate left atrial enlargement.  · Moderate right atrial enlargement.  · There is mild aortic valve stenosis.  · Aortic valve area is 2.11 cm2; peak velocity is 2.11 m/s; mean gradient is 10 mmHg.  · Moderate mitral regurgitation.  · Moderate tricuspid regurgitation.  · Elevated central venous pressure (15 mmHg).  · The estimated PA systolic pressure is 60 mmHg.  · There is pulmonary hypertension.  · Small to moderate circumferential pericardial effusion. No tamponade      IMPRESSION:  I had the pleasure of seeing Ms. Bloom in Endocrine Surgical consultation regarding the diagnosis of longstanding secondary hyperparathyroidism with significantly elevated PTH levels and hypocalcemia has yet to max out medical therapy.  Would not consider her medically refractory at this point in time.    We discussed the diagnosis of longstanding secondary hyperparathyroidism.  I discussed the implications of medical management versus surgical treatment for hyperparathyroidism and recommendations in accordance with the most recent AAES guidelines.  We discussed that hyperparathyroidism can compromise bone and cardiovascular health.  Specifically any future renal transplant could be at risk for loss.  In addition to classic symptoms such as kidney stones, fracture and bone loss, hyperparathyroidism can be associated with multiple symptoms including fatigue, depression, memory loss or mental status changes, concentration difficulties, pruritis, polyuria, nocturia, constipation, polydipsia, pancreatitis, and musculoskeletal aches and pains, calciphylaxis, soft tissue and vascular  calcifications.    Secondary Hyperparathyroidism resulting from underlying kidney pathology is characterized by diminished serum calcium, elevated serum phosphate, and low 1,25-dihydroxyvitamin D levels in the setting of an elevated PTH.  Parathyroidectomy has the potential to reduce cardiovascular and all-cause mortality in patients with secondary hyperparathyroidism. Patients who fail medical therapy, have complications due to hyperparathyroidism, or prefer surgery can be considered for parathyroidectomy.  Potential indications for parathyroidectomy in patients refractory to medical therapy include uncontrolled hypercalcemia or hyperphosphatemia, calciphylaxis or symptomatic severe extraskeletal calcification, cortical bone fractures, proximal muscle weakness, bone or joint pain affecting quality of life, uncontrolled pruritis resulting in skin lesions or affecting quality of life, and patients with imminent kidney transplant who are at risk for severe hypercalcemia post-transplant.    Problem List Items Addressed This Visit          Pulmonary    Pulmonary hypertension     Last echo with PA systolic pressure of 60 mmHg, increases perioperative risk.              Cardiac/Vascular    Chronic combined systolic and diastolic congestive heart failure     Significantly impaired ejection fraction, last EF 20%    - If surgical intervention were planned, would need perioperative risk assessment and medical optimization with Cardiology         ICD (implantable cardioverter-defibrillator) in place - SubQ     Noted.  Would need perioperative planning if surgical intervention were to be pursued.             Renal/    ESRD (end stage renal disease)    Hyperphosphatemia     Not on any phosphorus binders, will request evaluation by her current nephrologist, Dr. Childers for consideration of initiating phos binders.         Hypercalcemia     Not currently accurate, hypocalcemic currently.            Endocrine     Hyperparathyroidism, secondary renal - Primary     Ms. Bloom is our 56yo female with secondary hyperparathyroidism with low to normal calcemia levels and hyperphosphatemia.  PTH levels are well above the goal range of 2-9x the upper limit of normal, however she has not developed hypercalcemia and is thus at risk of hungry bone syndrome and severe hypocalcemia.  The hyperphosphatemia has has not been addressed medically.  Therefore, has not yet demonstrated failure of medical management.  At this time she is a poor surgical candidate with poor functional status and has not met criteria for parathyroidectomy in secondary hyperparathyroidism.      - If surgical intervention is pursued down the road, I will request perioperative cardiac risk assessment and recommendations for optimization from her cardiologist, Dr. Campbell prior to surgical intervention  - Will update her nephrologist, Dr. Childers with the plan for further medical optimization with phos binders.  - Request placed for FNA of thyroid nodules as below.  - Will follow up results of FNA then plan to recheck labs and monitor response to medical treatment for secondary hyperparathyroidism  - Established with Dr. Parker, Endocrinology         Multinodular goiter     Two thyroid nodules meeting criteria for FNA.    - Plan for FNA of nodules in right thyroid  - Recommend follow up ultrasound in 1 year from last ultrasound            Other    Debility     Frail and cachectic.  Required the use of a wheelchair today for transportation.      - Increased perioperative risk secondary to frailty.          Other Visit Diagnoses       Multiple thyroid nodules        Relevant Orders    US FNA Biopsy w/ Imaging 1st Lesion    US FNA Biopsy w/ Addl Lesion        Patient was seen and evaluated with surgery resident Mikey Buenrostro MD.    Kandis Brooks MD  [facility]  Endocrine Surgery  2/20/23

## 2023-02-17 ENCOUNTER — HOSPITAL ENCOUNTER (OUTPATIENT)
Dept: RADIOLOGY | Facility: HOSPITAL | Age: 58
Discharge: HOME | End: 2023-02-17
Attending: STUDENT IN AN ORGANIZED HEALTH CARE EDUCATION/TRAINING PROGRAM
Payer: MEDICARE

## 2023-02-17 ENCOUNTER — TELEPHONE (OUTPATIENT)
Dept: SURGERY | Facility: CLINIC | Age: 58
End: 2023-02-17
Payer: COMMERCIAL

## 2023-02-17 ENCOUNTER — PATIENT MESSAGE (OUTPATIENT)
Dept: SURGERY | Facility: CLINIC | Age: 58
End: 2023-02-17
Payer: COMMERCIAL

## 2023-02-17 DIAGNOSIS — N18.6 HYPERPARATHYROIDISM DUE TO ESRD ON DIALYSIS: ICD-10-CM

## 2023-02-17 DIAGNOSIS — Z99.2 HYPERPARATHYROIDISM DUE TO ESRD ON DIALYSIS: ICD-10-CM

## 2023-02-17 DIAGNOSIS — E04.2 MULTINODULAR GOITER: ICD-10-CM

## 2023-02-17 DIAGNOSIS — N25.81 HYPERPARATHYROIDISM DUE TO ESRD ON DIALYSIS: ICD-10-CM

## 2023-02-17 DIAGNOSIS — E04.2 MULTINODULAR GOITER: Primary | ICD-10-CM

## 2023-02-17 PROCEDURE — 76536 US EXAM OF HEAD AND NECK: CPT | Mod: TC

## 2023-02-17 PROCEDURE — 76536 US SOFT TISSUE HEAD NECK THYROID: ICD-10-PCS | Mod: 26,,, | Performed by: STUDENT IN AN ORGANIZED HEALTH CARE EDUCATION/TRAINING PROGRAM

## 2023-02-17 PROCEDURE — 76536 US EXAM OF HEAD AND NECK: CPT | Mod: 26,,, | Performed by: STUDENT IN AN ORGANIZED HEALTH CARE EDUCATION/TRAINING PROGRAM

## 2023-02-20 ENCOUNTER — OFFICE VISIT (OUTPATIENT)
Dept: SURGERY | Facility: CLINIC | Age: 58
End: 2023-02-20
Payer: MEDICARE

## 2023-02-20 ENCOUNTER — TELEPHONE (OUTPATIENT)
Dept: VASCULAR SURGERY | Facility: CLINIC | Age: 58
End: 2023-02-20
Payer: COMMERCIAL

## 2023-02-20 VITALS
WEIGHT: 99 LBS | SYSTOLIC BLOOD PRESSURE: 100 MMHG | HEART RATE: 70 BPM | HEIGHT: 64 IN | DIASTOLIC BLOOD PRESSURE: 58 MMHG | BODY MASS INDEX: 16.9 KG/M2 | OXYGEN SATURATION: 99 %

## 2023-02-20 DIAGNOSIS — I50.42 CHRONIC COMBINED SYSTOLIC AND DIASTOLIC CONGESTIVE HEART FAILURE: ICD-10-CM

## 2023-02-20 DIAGNOSIS — R53.81 DEBILITY: ICD-10-CM

## 2023-02-20 DIAGNOSIS — E83.39 HYPERPHOSPHATEMIA: ICD-10-CM

## 2023-02-20 DIAGNOSIS — E83.52 HYPERCALCEMIA: ICD-10-CM

## 2023-02-20 DIAGNOSIS — N25.81 HYPERPARATHYROIDISM, SECONDARY RENAL: Primary | ICD-10-CM

## 2023-02-20 DIAGNOSIS — E04.2 MULTIPLE THYROID NODULES: ICD-10-CM

## 2023-02-20 DIAGNOSIS — C64.9 METASTATIC RENAL CELL CARCINOMA, UNSPECIFIED LATERALITY: ICD-10-CM

## 2023-02-20 DIAGNOSIS — E04.2 MULTINODULAR GOITER: ICD-10-CM

## 2023-02-20 DIAGNOSIS — N18.6 ESRD (END STAGE RENAL DISEASE): ICD-10-CM

## 2023-02-20 DIAGNOSIS — Z95.810 ICD (IMPLANTABLE CARDIOVERTER-DEFIBRILLATOR) IN PLACE: ICD-10-CM

## 2023-02-20 DIAGNOSIS — I27.20 PULMONARY HYPERTENSION: ICD-10-CM

## 2023-02-20 PROCEDURE — 99999 PR PBB SHADOW E&M-EST. PATIENT-LVL IV: CPT | Mod: PBBFAC,,, | Performed by: STUDENT IN AN ORGANIZED HEALTH CARE EDUCATION/TRAINING PROGRAM

## 2023-02-20 PROCEDURE — 99214 OFFICE O/P EST MOD 30 MIN: CPT | Mod: PBBFAC | Performed by: STUDENT IN AN ORGANIZED HEALTH CARE EDUCATION/TRAINING PROGRAM

## 2023-02-20 PROCEDURE — 99999 PR PBB SHADOW E&M-EST. PATIENT-LVL IV: ICD-10-PCS | Mod: PBBFAC,,, | Performed by: STUDENT IN AN ORGANIZED HEALTH CARE EDUCATION/TRAINING PROGRAM

## 2023-02-20 PROCEDURE — 99214 PR OFFICE/OUTPT VISIT, EST, LEVL IV, 30-39 MIN: ICD-10-PCS | Mod: S$PBB,,, | Performed by: STUDENT IN AN ORGANIZED HEALTH CARE EDUCATION/TRAINING PROGRAM

## 2023-02-20 PROCEDURE — 99214 OFFICE O/P EST MOD 30 MIN: CPT | Mod: S$PBB,,, | Performed by: STUDENT IN AN ORGANIZED HEALTH CARE EDUCATION/TRAINING PROGRAM

## 2023-02-20 RX ORDER — CABOZANTINIB 60 MG/1
TABLET ORAL
Qty: 30 TABLET | Refills: 4 | Status: SHIPPED | OUTPATIENT
Start: 2023-02-20 | End: 2023-03-29

## 2023-02-20 NOTE — TELEPHONE ENCOUNTER
Returned call from the wound vac rep and informed her that the nurse was out and will return call on Wed.

## 2023-02-20 NOTE — Clinical Note
Justino Arce,  I had the pleasure of meeting Ms. Bloom today in clinic for evaluation of secondary hyperparathyroidism, she is a previous patient of Edson Murdock who made the original referral.  Unfortunately she does not yet meet criteria for surgery despite marked elevation of the PTH levels and she is a poor surgical candidate.  She has hyperphosphatemia which would be an indication for surgery if it is medically refractory.  Would you consider evaluating her to start her on phos binders and continue medical management of the hyperparathyroidism?  Sincerely, [facility] Endocrine Surgery

## 2023-02-20 NOTE — ASSESSMENT & PLAN NOTE
Two thyroid nodules meeting criteria for FNA.    - Plan for FNA of nodules in right thyroid  - Recommend follow up ultrasound in 1 year from last ultrasound

## 2023-02-20 NOTE — ASSESSMENT & PLAN NOTE
Not on any phosphorus binders, will request evaluation by her current nephrologist, Dr. Childers for consideration of initiating phos binders.

## 2023-02-20 NOTE — TELEPHONE ENCOUNTER
----- Message from Nichelle Arias sent at 2/20/2023  8:58 AM CST -----  Regarding: Patient advice  Contact: [phone] Ext [phone] Valentina Dorado called to check on the status of a referral for wound vac therapy on patient behalf Please call to discuss further

## 2023-02-20 NOTE — ASSESSMENT & PLAN NOTE
Ms. Bloom is our 56yo female with secondary hyperparathyroidism with low to normal calcemia levels and hyperphosphatemia.  PTH levels are well above the goal range of 2-9x the upper limit of normal, however she has not developed hypercalcemia and is thus at risk of hungry bone syndrome and severe hypocalcemia.  The hyperphosphatemia has has not been addressed medically.  Therefore, has not yet demonstrated failure of medical management.  At this time she is a poor surgical candidate with poor functional status and has not met criteria for parathyroidectomy in secondary hyperparathyroidism.      - If surgical intervention is pursued down the road, I will request perioperative cardiac risk assessment and recommendations for optimization from her cardiologist, Dr. Campbell prior to surgical intervention  - Will update her nephrologist, Dr. Childers with the plan for further medical optimization with phos binders.  - Request placed for FNA of thyroid nodules as below.  - Will follow up results of FNA then plan to recheck labs and monitor response to medical treatment for secondary hyperparathyroidism  - Established with Dr. Parker, Endocrinology

## 2023-02-20 NOTE — ASSESSMENT & PLAN NOTE
Significantly impaired ejection fraction, last EF 20%    - If surgical intervention were planned, would need perioperative risk assessment and medical optimization with Cardiology

## 2023-02-23 ENCOUNTER — TELEPHONE (OUTPATIENT)
Dept: VASCULAR SURGERY | Facility: CLINIC | Age: 58
End: 2023-02-23
Payer: COMMERCIAL

## 2023-02-23 NOTE — TELEPHONE ENCOUNTER
Contacted Valentina in response to message. Explained that nurse has been out of clinic and has not seen Dr. Muñoz this week, however nurse will have order signed by Dr. Muñoz and will fax back to Valentina after clinic with surgeon tomorrow. Valentina verbalized understanding.----- Message from John Perez sent at 2/23/2023  9:16 AM CST -----  Regarding: adv  Contact: @ [phone] ext [phone]  I Rep called to check on the status of a referral for wound vac therapy on patient behalf Please call to discuss further @ [phone] ext [phone]

## 2023-02-24 ENCOUNTER — OFFICE VISIT (OUTPATIENT)
Dept: VASCULAR SURGERY | Facility: CLINIC | Age: 58
End: 2023-02-24
Payer: MEDICARE

## 2023-02-24 ENCOUNTER — TELEPHONE (OUTPATIENT)
Dept: VASCULAR SURGERY | Facility: CLINIC | Age: 58
End: 2023-02-24

## 2023-02-24 VITALS
TEMPERATURE: 97 F | HEIGHT: 64 IN | HEART RATE: 66 BPM | SYSTOLIC BLOOD PRESSURE: 170 MMHG | DIASTOLIC BLOOD PRESSURE: 79 MMHG | WEIGHT: 99.19 LBS | BODY MASS INDEX: 16.93 KG/M2

## 2023-02-24 DIAGNOSIS — N18.6 ESRD (END STAGE RENAL DISEASE): Primary | ICD-10-CM

## 2023-02-24 PROCEDURE — 99024 PR POST-OP FOLLOW-UP VISIT: ICD-10-PCS | Mod: POP,,, | Performed by: SURGERY

## 2023-02-24 PROCEDURE — 99214 OFFICE O/P EST MOD 30 MIN: CPT | Mod: PBBFAC | Performed by: SURGERY

## 2023-02-24 PROCEDURE — 99999 PR PBB SHADOW E&M-EST. PATIENT-LVL IV: ICD-10-PCS | Mod: PBBFAC,,, | Performed by: SURGERY

## 2023-02-24 PROCEDURE — 99999 PR PBB SHADOW E&M-EST. PATIENT-LVL IV: CPT | Mod: PBBFAC,,, | Performed by: SURGERY

## 2023-02-24 PROCEDURE — 99024 POSTOP FOLLOW-UP VISIT: CPT | Mod: POP,,, | Performed by: SURGERY

## 2023-02-24 NOTE — TELEPHONE ENCOUNTER
Contacted Nallely in response to message. Explained Dr. Muñoz's directions to apply hydrogel to bed of new wound at skin surface over AVG, apply calmoseptine or other barrier cream to periwound area, cover with 2x2 gauze, and then to apply clear drape for adjacent wound being treated with wound vac over gauze as to allow for appropriate seal of vac. Also explained that this new small wound must be monitored closely because if it becomes bigger or deeper AVG may need to be removed. Nallely verbalized understanding. ----- Message from Karen Ralph sent at 2/24/2023 11:18 AM CST -----  Contact: Nallely @ [phone]  Nallely from [facility] calling regarding Patient Advice (message) for #calling to speak with alok to get clearification on wound care orders that was written for pt, asking for call back

## 2023-02-24 NOTE — TELEPHONE ENCOUNTER
Contacted [facility] in response to message. Explained that wound was not measured during clinic visit today however she may contact Hope with [facility] who will obtain new wound measurements during visit with pt. ----- Message from Meredith Palma sent at 2/24/2023 11:48 AM CST -----  Regarding: Patient Call Back  .Type: Patient Call Back    Who called: HANNA IZABEL -- Shea    What is the request in detail: Calling for wound measurements and wound anay status. Reference #: [id number]    Can the clinic reply by [facility]? No    Would the patient rather a call back or a response via [facility]? Call back    Best call back number: [phone] Ext. [phone]    Additional Information:

## 2023-02-24 NOTE — PROGRESS NOTES
VASCULAR SURGERY SERVICE    CHIEF COMPLAINT:  Functional left AV graft dysfunctional left AV graft    HISTORY OF PRESENT ILLNESS: Eva Bloom is a 57 y.o. female end-stage renal disease, with multiple serious medical comorbidities including admission for sepsis 2 months ago, EF 20%, AFib on Eliquis, who is having increasing bleeding after her dialysis runs.  I placed a revision, left AV graft with long interposition Accu Seal 12/28/2022.  She has had uninterrupted use of this graft ever since.    Recent weeks she is had increased bleeding after dialysis sticks    S/p:  Excision, excluded L AVG and permacath 1/25/2023  revision, left AV graft with long interposition Accu Seal 12/28/2022  Original left AV graft 2017 with subsequent covered stent placement in venous outflow    1/13/2023:  This is a 1st postoperative visit after revision, left AV graft with long interposition Accu Seal 12/28/2022.  She is using the revised graft without any difficulty    1/24/2023:  She now returns with pain and erythema at her old excluded left AV graft.  She continues to use the new graft without difficulty which was tunneled laterally.  She last dialyzed today.  She also has a cold    2/10/2023:  This is her initial follow-up after excision of the excluded left AV graft 2 weeks ago.  She is without complaint     2/24/2023:  2 week follow-up.  She is without complaint    Past Medical History:   Diagnosis Date    Anemia     Bronchitis 03/01/2017    Cancer 2016    kidney cancer    CHF (congestive heart failure), NYHA class II, chronic, systolic     CMV (cytomegalovirus) antibody positive     Essential hypertension 9/23/2015    H/O herpes simplex type 2 infection     Herpes simplex type 1 antibody positive     History of kidney cancer     s/p left nephrectomy 1/2016    Hyperparathyroidism, unspecified     Hyperuricemia without signs of inflammatory arthritis and tophaceous disease     Kidney stones     LGSIL (low grade squamous  intraepithelial dysplasia)     Myocardiopathy 7/21/2017    Prediabetes     Proteinuria     Renal disorder     Thyroid nodule     Urate nephropathy        Past Surgical History:   Procedure Laterality Date    BREAST CYST EXCISION      COLONOSCOPY N/A 11/12/2015    COLONOSCOPY N/A 3/12/2021    Procedure: COLONOSCOPY;  Surgeon: Brendon Lanier MD;  Location: [facility];  Service: Endoscopy;  Laterality: N/A;  covid test 3/9, labs prior, prep instr mailed -ml    INSERTION OF BIVENTRICULAR IMPLANTABLE CARDIOVERTER-DEFIBRILLATOR (ICD)  04/2021    INSERTION OF TUNNELED CENTRAL VENOUS CATHETER (CVC) WITH SUBCUTANEOUS PORT N/A 1/25/2023    Procedure: INSERTION, DUAL LUMEN CATHETER WITH PORT, WITH IMAGING GUIDANCE;  Surgeon: FARIDEH Muñoz III, MD;  [facility];  Service: Peripheral Vascular;  Laterality: N/A;  possinble permcath placment    NEPHRECTOMY-LAPAROSCOPIC Left 01/12/2016    PERITONEAL CATHETER INSERTION      Permacath insertion  01/12/2017    PLACEMENT OF ARTERIOVENOUS GRAFT  12/28/2022    Procedure: INSERTION, GRAFT, ARTERIOVENOUS;  Surgeon: FARIDEH Muñoz III, MD;  [facility];  Service: Peripheral Vascular;;    REMOVAL, GRAFT, ARTERIOVENOUS, UPPER EXTREMITY Left 1/25/2023    Procedure: REMOVAL, GRAFT, ARTERIOVENOUS, UPPER EXTREMITY;  Surgeon: FARIDEH Muñoz III, MD;  [facility];  Service: Peripheral Vascular;  Laterality: Left;    REVISION OF PROCEDURE INVOLVING ARTERIOVENOUS GRAFT Left 12/28/2022    Procedure: REVISION, PROCEDURE INVOLVING ARTERIOVENOUS GRAFT;  Surgeon: FARIDEH Muñoz III, MD;  Location: [facility];  Service: Peripheral Vascular;  Laterality: Left;    SALPINGOOPHORECTOMY Right 2016    KJB---DAVINCI    TONSILLECTOMY      TUBAL LIGATION           Current Outpatient Medications:     acetaminophen (TYLENOL) 500 MG tablet, Take 500 mg by mouth every 6 (six) hours as needed for Pain., Disp: , Rfl:     apixaban (ELIQUIS) 2.5 mg Tab, Take 1 tablet  (2.5 mg total) by mouth 2 (two) times daily., Disp: 60 tablet, Rfl: 11    cabozantinib (CABOMETYX) 60 mg Tab, TAKE ONE TABLET BY MOUTH ONCE DAILY AT THE SAME TIME ON AN EMPTY STOMACH AT LEAST 1 HOUR BEFORE OR 2 HOURS AFTER EATING. AVOID GRAPEFRUIT PRODUCTS, Disp: 30 tablet, Rfl: 4    cloNIDine 0.3 mg/24 hr td ptwk (CATAPRES) 0.3 mg/24 hr, Place 1 patch onto the skin every 7 days., Disp: 4 patch, Rfl: 11    epoetin david-epbx (RETACRIT INJ), Epoetin david - epbx (Retacrit), Disp: , Rfl:     gabapentin (NEURONTIN) 100 MG capsule, Take 1 capsule (100 mg total) by mouth once daily., Disp: 30 capsule, Rfl: 11    hydrALAZINE (APRESOLINE) 100 MG tablet, Take 1 tablet (100 mg total) by mouth every 12 (twelve) hours., Disp: 180 tablet, Rfl: 2    metoprolol succinate (TOPROL-XL) 100 MG 24 hr tablet, Take 1/2 tablet (50mg) once daily, Disp: 90 tablet, Rfl: 3    mv,Ca,min-folic acid-vit K1 (ONE-A-DAY WOMEN'S 50 PLUS) 400-20 mcg Tab, Take 1 tablet by mouth once daily., Disp: , Rfl:     sacubitriL-valsartan (ENTRESTO) 49-51 mg per tablet, Take 1 tablet by mouth 2 (two) times daily., Disp: 60 tablet, Rfl: 11    sodium chloride 0.9% SolP 100 mL with iron sucrose 100 mg iron/5 mL Soln 100 mg, 50 mg every Tues, Thurs, Sat., Disp: , Rfl:     vancomycin 125 mg/5 mL Soln, Take 5 mLs (125 mg total) by mouth every 6 (six) hours for 14 days, THEN 5 mLs (125 mg total) every 12 (twelve) hours for 14 days., Disp: 420 mL, Rfl: 0    HYDROcodone-acetaminophen (NORCO) 5-325 mg per tablet, Take 1 tablet by mouth every 4 (four) hours as needed for Pain. Causes drowsiness. Do not drive or operate machinery with this medication. Do not drink alcohol with this medication. Causes constipation. Take with stool softener. (Patient not taking: Reported on 2/24/2023), Disp: 10 tablet, Rfl: 0    lidocaine-prilocaine (EMLA) cream, APPLY ATLEAST 30 MINUTES BEFORE TREATMENT 3 TIMES A WEEK (Patient not taking: Reported on 2/24/2023), Disp: 30 g, Rfl: 11     naloxone (NARCAN) 1 mg/mL injection, 2 mg (1 mg per nostril) by Nasal route as needed for opioid overdose; may repeat in 3 to 5 minutes if not effective. Call 911 (Patient not taking: Reported on 2/24/2023), Disp: 2 mL, Rfl: 11    oxyCODONE (OXY-IR) 5 mg Cap, Take 1 capsule (5 mg total) by mouth every 6 (six) hours as needed for Pain. (Patient not taking: Reported on 2/24/2023), Disp: 12 capsule, Rfl: 0    polyethylene glycol (GLYCOLAX) 17 gram/dose powder, Take 17 g by mouth once daily. (Patient not taking: Reported on 2/24/2023), Disp: 235 g, Rfl: 0    promethazine (PHENERGAN) 25 MG tablet, Take 1 tablet (25 mg total) by mouth every 6 (six) hours as needed for Nausea. (Patient not taking: Reported on 2/24/2023), Disp: 15 tablet, Rfl: 0    Review of patient's allergies indicates:   Allergen Reactions    Coreg [carvedilol] Other (See Comments)     Nausea/vomiting    Allopurinol      Other reaction(s): abnormal transaminases       Family History   Problem Relation Age of Onset    Hypertension Mother     Diabetes Father     Kidney disease Father     No Known Problems Son     No Known Problems Son     Diabetes Maternal Grandfather     No Known Problems Sister     No Known Problems Brother     No Known Problems Maternal Grandmother     No Known Problems Paternal Grandmother     No Known Problems Paternal Grandfather     Heart disease Sister     No Known Problems Sister     No Known Problems Brother     No Known Problems Maternal Aunt     No Known Problems Maternal Uncle     No Known Problems Paternal Aunt     No Known Problems Paternal Uncle     Breast cancer Neg Hx     Colon cancer Neg Hx     Cancer Neg Hx     Stroke Neg Hx     Amblyopia Neg Hx     Blindness Neg Hx     Cataracts Neg Hx     Glaucoma Neg Hx     Macular degeneration Neg Hx     Retinal detachment Neg Hx     Strabismus Neg Hx     Thyroid disease Neg Hx        Social History     Tobacco Use    Smoking status: Never    Smokeless tobacco: Never   Substance Use  "Topics    Alcohol use: No     Comment: . 2 children. works at [employer].    Drug use: No       PHYSICAL EXAM:   Temp 97.4 °F (36.3 °C) (Oral)   Ht 5' 4" (1.626 m)   Wt 45 kg (99 lb 3.3 oz)   LMP 10/24/2016   BMI 17.03 kg/m²   Constitutional:  Alert,   Well-appearing  In no distress.   Neurological: Normal speech  no focal findings  CN II - XII grossly intact.    Psychiatric: Mood and affect appropriate and symmetric.   HEENT: Normocephalic / atraumatic  PERRLA  Midline trachea  No scars across the neck   Cardiac: Regular rate and rhythm.   Pulmonary: Normal pulmonary effort.   Abdomen: Soft, not distended.     Skin: Warm and well perfused.    Vascular:  Radial pulses are nonpalpable bilaterally.   Extremities/  Musculoskeletal: No edema.   Left arm demonstrates a thrill in the lateral revised graft.  The lateral counter incision now has some small amount of exudate which was not present prior.  See picture.  The inner area that was excised is clean, with improved granulation tissue  See picture   2/10/2023      2/24/2023        IMAGING:  Prior Duplex of the left AV graft showed to be patent without stenosis with a flow volume of 1.78 L      IMPRESSION:  4 weeks status post excision, infected excluded AV graft.  Improved granulation tissue and excised bed.  Unfortunately there is now a superficial breakdown of the lateral incision over the revised graft.  No no graft exposed    PLAN   DC medial sutures today  Continue wound VAC to prior area  Medihoney to lateral site  Follow-up in 2 weeks    I have advised the patient and family that if the lateral graft becomes exposed, it will have to be removed    MANPREET Muñoz III, MD, FACS  Professor and Chief, Vascular and Endovascular Surgery              "

## 2023-03-03 ENCOUNTER — TELEPHONE (OUTPATIENT)
Dept: HOME HEALTH SERVICES | Facility: CLINIC | Age: 58
End: 2023-03-03
Payer: COMMERCIAL

## 2023-03-03 NOTE — TELEPHONE ENCOUNTER
[facility] to accompany orders that were faxed on 2/8 for wheelchair.  Fax confirmation received.

## 2023-03-06 ENCOUNTER — TELEPHONE (OUTPATIENT)
Dept: HOME HEALTH SERVICES | Facility: CLINIC | Age: 58
End: 2023-03-06
Payer: COMMERCIAL

## 2023-03-06 DIAGNOSIS — T82.7XXA ARTERIOVENOUS GRAFT INFECTION, INITIAL ENCOUNTER: ICD-10-CM

## 2023-03-06 DIAGNOSIS — N18.30 STAGE 3 CHRONIC KIDNEY DISEASE, UNSPECIFIED WHETHER STAGE 3A OR 3B CKD: Primary | ICD-10-CM

## 2023-03-07 PROCEDURE — G0179 MD RECERTIFICATION HHA PT: HCPCS | Mod: ,,, | Performed by: SURGERY

## 2023-03-07 PROCEDURE — G0179 PR HOME HEALTH MD RECERTIFICATION: ICD-10-PCS | Mod: ,,, | Performed by: SURGERY

## 2023-03-08 ENCOUNTER — OFFICE VISIT (OUTPATIENT)
Dept: INFECTIOUS DISEASES | Facility: CLINIC | Age: 58
End: 2023-03-08
Payer: MEDICARE

## 2023-03-08 VITALS — TEMPERATURE: 98 F

## 2023-03-08 DIAGNOSIS — A04.72 C. DIFFICILE DIARRHEA: ICD-10-CM

## 2023-03-08 DIAGNOSIS — T82.7XXD VASCULAR GRAFT INFECTION, SUBSEQUENT ENCOUNTER: Primary | ICD-10-CM

## 2023-03-08 PROCEDURE — 99999 PR PBB SHADOW E&M-EST. PATIENT-LVL III: CPT | Mod: PBBFAC,,, | Performed by: INTERNAL MEDICINE

## 2023-03-08 PROCEDURE — 99999 PR PBB SHADOW E&M-EST. PATIENT-LVL III: ICD-10-PCS | Mod: PBBFAC,,, | Performed by: INTERNAL MEDICINE

## 2023-03-08 PROCEDURE — 99214 PR OFFICE/OUTPT VISIT, EST, LEVL IV, 30-39 MIN: ICD-10-PCS | Mod: S$PBB,,, | Performed by: INTERNAL MEDICINE

## 2023-03-08 PROCEDURE — 99213 OFFICE O/P EST LOW 20 MIN: CPT | Mod: PBBFAC | Performed by: INTERNAL MEDICINE

## 2023-03-08 PROCEDURE — 99214 OFFICE O/P EST MOD 30 MIN: CPT | Mod: S$PBB,,, | Performed by: INTERNAL MEDICINE

## 2023-03-08 NOTE — PROGRESS NOTES
Subjective:      Patient ID: Eva Bloom is a 57 y.o. female.    Chief Complaint:Follow-up      History of Present Illness    Ms. Bloom is a 57 y.o. F PMHx of ESRD on HD (LUE AVF), RCC (s/p L nephrectomy in 2016), HFrEF (EF 25%) s/p AICD, Afib on Eliquis and placement of a new LUE graft in mid-December of 2022 (with retention of the old graft in her LUE).  She was admitted to the hospital and the old graft was excised on 1/25/23.  Cultures were positive for staph epidermidis.  A wound vac was placed.  She was discharged to complete a 6 week course of IV cefazolin to be dosed with dialysis.  Her hospital course was complicated by development of C diff.  She was started on oral vancomycin and her symptoms are a bit better.  She is here today for follow up.  She has dialysis tue/thur/sat.  Home health sees her on mon/wed/fri.      Review of Systems   Constitutional: Negative for chills, decreased appetite, fever, malaise/fatigue, night sweats, weight gain and weight loss.   HENT:  Negative for congestion, ear pain, hearing loss, hoarse voice, sore throat and tinnitus.    Eyes:  Negative for blurred vision, redness and visual disturbance.   Cardiovascular:  Negative for chest pain, leg swelling and palpitations.   Respiratory:  Negative for cough, hemoptysis, shortness of breath, sputum production and wheezing.    Hematologic/Lymphatic: Negative for adenopathy. Does not bruise/bleed easily.   Skin:  Negative for dry skin, itching, rash and suspicious lesions.   Musculoskeletal:  Negative for back pain, joint pain, myalgias and neck pain.   Gastrointestinal:  Negative for abdominal pain, constipation, diarrhea, heartburn, nausea and vomiting.   Genitourinary:  Negative for dysuria, flank pain, frequency, hematuria, hesitancy and urgency.   Neurological:  Negative for dizziness, headaches, numbness, paresthesias and weakness.   Psychiatric/Behavioral:  Negative for depression and memory loss. The patient does not  have insomnia and is not nervous/anxious.    Objective:   Physical Exam  Vitals and nursing note reviewed.   Constitutional:       General: She is not in acute distress.     Appearance: She is well-developed. She is not diaphoretic.   HENT:      Head: Normocephalic and atraumatic.      Right Ear: External ear normal.      Left Ear: External ear normal.      Nose: Nose normal.      Mouth/Throat:      Pharynx: No oropharyngeal exudate.   Eyes:      General: No scleral icterus.        Right eye: No discharge.         Left eye: No discharge.      Conjunctiva/sclera: Conjunctivae normal.      Pupils: Pupils are equal, round, and reactive to light.   Neck:      Thyroid: No thyromegaly.      Vascular: No JVD.      Trachea: No tracheal deviation.   Cardiovascular:      Rate and Rhythm: Normal rate and regular rhythm.      Heart sounds: No murmur heard.    No friction rub. No gallop.   Pulmonary:      Effort: Pulmonary effort is normal. No respiratory distress.      Breath sounds: Normal breath sounds. No stridor. No wheezing or rales.   Chest:      Chest wall: No tenderness.   Abdominal:      General: Bowel sounds are normal. There is no distension.      Palpations: Abdomen is soft. There is no mass.      Tenderness: There is no abdominal tenderness. There is no guarding or rebound.   Musculoskeletal:         General: No tenderness. Normal range of motion.      Cervical back: Normal range of motion and neck supple.      Comments: Wound vac covering left upper arm wound   Lymphadenopathy:      Cervical: No cervical adenopathy.   Skin:     General: Skin is warm.      Coloration: Skin is not pale.      Findings: No erythema or rash.   Neurological:      Mental Status: She is alert and oriented to person, place, and time.      Cranial Nerves: No cranial nerve deficit.      Motor: No abnormal muscle tone.      Coordination: Coordination normal.      Deep Tendon Reflexes: Reflexes normal.     Assessment:       1. Vascular graft  infection, subsequent encounter :  Patient has completed 6 weeks of IV antibiotics.  Labs are okay.  Picture of the surgical wound reviewed.  No further IV antibiotics.  Continue with wound care.   2. C. difficile diarrhea :  diarrhea has resolved now.         Plan:       Vascular graft infection, subsequent encounter   - no further antibiotics at this time.    C. difficile diarrhea   - resolved now.

## 2023-03-10 ENCOUNTER — OFFICE VISIT (OUTPATIENT)
Dept: VASCULAR SURGERY | Facility: CLINIC | Age: 58
End: 2023-03-10
Payer: MEDICARE

## 2023-03-10 VITALS
TEMPERATURE: 98 F | WEIGHT: 98 LBS | SYSTOLIC BLOOD PRESSURE: 139 MMHG | HEART RATE: 81 BPM | HEIGHT: 64 IN | DIASTOLIC BLOOD PRESSURE: 65 MMHG | BODY MASS INDEX: 16.73 KG/M2

## 2023-03-10 DIAGNOSIS — N18.6 ESRD (END STAGE RENAL DISEASE): Primary | ICD-10-CM

## 2023-03-10 PROCEDURE — 99024 PR POST-OP FOLLOW-UP VISIT: ICD-10-PCS | Mod: POP,,, | Performed by: SURGERY

## 2023-03-10 PROCEDURE — 99214 OFFICE O/P EST MOD 30 MIN: CPT | Mod: PBBFAC | Performed by: SURGERY

## 2023-03-10 PROCEDURE — 99999 PR PBB SHADOW E&M-EST. PATIENT-LVL IV: ICD-10-PCS | Mod: PBBFAC,,, | Performed by: SURGERY

## 2023-03-10 PROCEDURE — 99024 POSTOP FOLLOW-UP VISIT: CPT | Mod: POP,,, | Performed by: SURGERY

## 2023-03-10 PROCEDURE — 99999 PR PBB SHADOW E&M-EST. PATIENT-LVL IV: CPT | Mod: PBBFAC,,, | Performed by: SURGERY

## 2023-03-10 NOTE — PROGRESS NOTES
VASCULAR SURGERY SERVICE    CHIEF COMPLAINT:  Functional left AV graft dysfunctional left AV graft    HISTORY OF PRESENT ILLNESS: Eva Bloom is a 57 y.o. female end-stage renal disease, with multiple serious medical comorbidities including admission for sepsis 2 months ago, EF 20%, AFib on Eliquis, who is having increasing bleeding after her dialysis runs.  I placed a revision, left AV graft with long interposition Accu Seal 12/28/2022.  She has had uninterrupted use of this graft ever since.    Recent weeks she is had increased bleeding after dialysis sticks    S/p:  Excision, excluded L AVG and permacath 1/25/2023  revision, left AV graft with long interposition Accu Seal 12/28/2022  Original left AV graft 2017 with subsequent covered stent placement in venous outflow    1/13/2023:  This is a 1st postoperative visit after revision, left AV graft with long interposition Accu Seal 12/28/2022.  She is using the revised graft without any difficulty    1/24/2023:  She now returns with pain and erythema at her old excluded left AV graft.  She continues to use the new graft without difficulty which was tunneled laterally.  She last dialyzed today.  She also has a cold    2/10/2023:  This is her initial follow-up after excision of the excluded left AV graft 2 weeks ago.  She is without complaint     2/24/2023:  2 week follow-up.  She is without complaint    03/10/2023:  She continues use the wound VAC on the medial excised site, with Medihoney to the small down the lateral counter incision of the new graft    Past Medical History:   Diagnosis Date    Anemia     Bronchitis 03/01/2017    Cancer 2016    kidney cancer    CHF (congestive heart failure), NYHA class II, chronic, systolic     CMV (cytomegalovirus) antibody positive     Essential hypertension 9/23/2015    H/O herpes simplex type 2 infection     Herpes simplex type 1 antibody positive     History of kidney cancer     s/p left nephrectomy 1/2016     Hyperparathyroidism, unspecified     Hyperuricemia without signs of inflammatory arthritis and tophaceous disease     Kidney stones     LGSIL (low grade squamous intraepithelial dysplasia)     Myocardiopathy 7/21/2017    Prediabetes     Proteinuria     Renal disorder     Thyroid nodule     Urate nephropathy        Past Surgical History:   Procedure Laterality Date    BREAST CYST EXCISION      COLONOSCOPY N/A 11/12/2015    COLONOSCOPY N/A 3/12/2021    Procedure: COLONOSCOPY;  Surgeon: Brendon Lanier MD;  Location: [facility];  Service: Endoscopy;  Laterality: N/A;  covid test 3/9, labs prior, prep instr mailed -ml    INSERTION OF BIVENTRICULAR IMPLANTABLE CARDIOVERTER-DEFIBRILLATOR (ICD)  04/2021    INSERTION OF TUNNELED CENTRAL VENOUS CATHETER (CVC) WITH SUBCUTANEOUS PORT N/A 1/25/2023    Procedure: INSERTION, DUAL LUMEN CATHETER WITH PORT, WITH IMAGING GUIDANCE;  Surgeon: FARIDEH Muñoz III, MD;  [facility];  Service: Peripheral Vascular;  Laterality: N/A;  possinble permcath placment    NEPHRECTOMY-LAPAROSCOPIC Left 01/12/2016    PERITONEAL CATHETER INSERTION      Permacath insertion  01/12/2017    PLACEMENT OF ARTERIOVENOUS GRAFT  12/28/2022    Procedure: INSERTION, GRAFT, ARTERIOVENOUS;  Surgeon: FARIDEH Muñoz III, MD;  [facility];  Service: Peripheral Vascular;;    REMOVAL, GRAFT, ARTERIOVENOUS, UPPER EXTREMITY Left 1/25/2023    Procedure: REMOVAL, GRAFT, ARTERIOVENOUS, UPPER EXTREMITY;  Surgeon: FARIDEH Muñoz III, MD;  Location: [facility] OR [facility];  Service: Peripheral Vascular;  Laterality: Left;    REVISION OF PROCEDURE INVOLVING ARTERIOVENOUS GRAFT Left 12/28/2022    Procedure: REVISION, PROCEDURE INVOLVING ARTERIOVENOUS GRAFT;  Surgeon: FARIDEH Muñoz III, MD;  Location: [facility] OR [facility];  Service: Peripheral Vascular;  Laterality: Left;    SALPINGOOPHORECTOMY Right 2016    KJB---DAVINCI    TONSILLECTOMY      TUBAL LIGATION           Current Outpatient Medications:      acetaminophen (TYLENOL) 500 MG tablet, Take 500 mg by mouth every 6 (six) hours as needed for Pain., Disp: , Rfl:     apixaban (ELIQUIS) 2.5 mg Tab, Take 1 tablet (2.5 mg total) by mouth 2 (two) times daily., Disp: 60 tablet, Rfl: 11    cabozantinib (CABOMETYX) 60 mg Tab, TAKE ONE TABLET BY MOUTH ONCE DAILY AT THE SAME TIME ON AN EMPTY STOMACH AT LEAST 1 HOUR BEFORE OR 2 HOURS AFTER EATING. AVOID GRAPEFRUIT PRODUCTS, Disp: 30 tablet, Rfl: 4    cloNIDine 0.3 mg/24 hr td ptwk (CATAPRES) 0.3 mg/24 hr, Place 1 patch onto the skin every 7 days., Disp: 4 patch, Rfl: 11    gabapentin (NEURONTIN) 100 MG capsule, Take 1 capsule (100 mg total) by mouth once daily., Disp: 30 capsule, Rfl: 11    HYDROcodone-acetaminophen (NORCO) 5-325 mg per tablet, Take 1 tablet by mouth every 4 (four) hours as needed for Pain. Causes drowsiness. Do not drive or operate machinery with this medication. Do not drink alcohol with this medication. Causes constipation. Take with stool softener., Disp: 10 tablet, Rfl: 0    lidocaine-prilocaine (EMLA) cream, APPLY ATLEAST 30 MINUTES BEFORE TREATMENT 3 TIMES A WEEK, Disp: 30 g, Rfl: 11    metoprolol succinate (TOPROL-XL) 100 MG 24 hr tablet, Take 1/2 tablet (50mg) once daily, Disp: 90 tablet, Rfl: 3    mv,Ca,min-folic acid-vit K1 (ONE-A-DAY WOMEN'S 50 PLUS) 400-20 mcg Tab, Take 1 tablet by mouth once daily., Disp: , Rfl:     naloxone (NARCAN) 1 mg/mL injection, 2 mg (1 mg per nostril) by Nasal route as needed for opioid overdose; may repeat in 3 to 5 minutes if not effective. Call 911, Disp: 2 mL, Rfl: 11    oxyCODONE (OXY-IR) 5 mg Cap, Take 1 capsule (5 mg total) by mouth every 6 (six) hours as needed for Pain., Disp: 12 capsule, Rfl: 0    polyethylene glycol (GLYCOLAX) 17 gram/dose powder, Take 17 g by mouth once daily., Disp: 235 g, Rfl: 0    promethazine (PHENERGAN) 25 MG tablet, Take 1 tablet (25 mg total) by mouth every 6 (six) hours as needed for Nausea., Disp: 15 tablet, Rfl: 0     sacubitriL-valsartan (ENTRESTO) 49-51 mg per tablet, Take 1 tablet by mouth 2 (two) times daily., Disp: 60 tablet, Rfl: 11    sodium chloride 0.9% SolP 100 mL with iron sucrose 100 mg iron/5 mL Soln 100 mg, 50 mg every Tues, Thurs, Sat., Disp: , Rfl:     vancomycin 125 mg/5 mL Soln, Take 5 mLs (125 mg total) by mouth every 6 (six) hours for 14 days, THEN 5 mLs (125 mg total) every 12 (twelve) hours for 14 days., Disp: 420 mL, Rfl: 0    hydrALAZINE (APRESOLINE) 100 MG tablet, Take 1 tablet (100 mg total) by mouth every 12 (twelve) hours., Disp: 180 tablet, Rfl: 2    Review of patient's allergies indicates:   Allergen Reactions    Coreg [carvedilol] Other (See Comments)     Nausea/vomiting    Allopurinol      Other reaction(s): abnormal transaminases       Family History   Problem Relation Age of Onset    Hypertension Mother     Diabetes Father     Kidney disease Father     No Known Problems Son     No Known Problems Son     Diabetes Maternal Grandfather     No Known Problems Sister     No Known Problems Brother     No Known Problems Maternal Grandmother     No Known Problems Paternal Grandmother     No Known Problems Paternal Grandfather     Heart disease Sister     No Known Problems Sister     No Known Problems Brother     No Known Problems Maternal Aunt     No Known Problems Maternal Uncle     No Known Problems Paternal Aunt     No Known Problems Paternal Uncle     Breast cancer Neg Hx     Colon cancer Neg Hx     Cancer Neg Hx     Stroke Neg Hx     Amblyopia Neg Hx     Blindness Neg Hx     Cataracts Neg Hx     Glaucoma Neg Hx     Macular degeneration Neg Hx     Retinal detachment Neg Hx     Strabismus Neg Hx     Thyroid disease Neg Hx        Social History     Tobacco Use    Smoking status: Never    Smokeless tobacco: Never   Substance Use Topics    Alcohol use: No     Comment: . 2 children. works at [employer].    Drug use: No       PHYSICAL EXAM:   /65 (BP Location: Right arm, Patient Position: Sitting,  "BP Method: Large (Automatic))   Pulse 81   Temp 98.2 °F (36.8 °C) (Oral)   Ht 5' 4" (1.626 m)   Wt 44.5 kg (98 lb)   LMP 10/24/2016   BMI 16.82 kg/m²   Constitutional:  Alert,   Well-appearing  In no distress.   Neurological: Normal speech  no focal findings  CN II - XII grossly intact.    Psychiatric: Mood and affect appropriate and symmetric.   HEENT: Normocephalic / atraumatic  PERRLA  Midline trachea  No scars across the neck   Cardiac: Regular rate and rhythm.   Pulmonary: Normal pulmonary effort.   Abdomen: Soft, not distended.     Skin: Warm and well perfused.    Vascular:  Radial pulses are nonpalpable bilaterally.   Extremities/  Musculoskeletal: No edema.   Left arm demonstrates a thrill in the lateral revised graft.  No new pictures today.  The medial area has improving granulation tissue but still not ready to stop the wound VAC.  The lateral counter incision site is almost completely healed   2/10/2023      2/24/2023        IMAGING:  Prior Duplex of the left AV graft showed to be patent without stenosis with a flow volume of 1.78 L      IMPRESSION:  6 weeks status post excision, infected excluded AV graft.  Improved granulation tissue and excised bed.    Almost healed lateral counter incision site    PLAN  Continue wound VAC to prior area  Medihoney to lateral site  Follow-up in 3 weeks    I have advised the patient and family that if the lateral graft becomes exposed, it will have to be removed    MANPREET Muñoz III, MD, FACS  Professor and Chief, Vascular and Endovascular Surgery                "

## 2023-03-13 ENCOUNTER — DOCUMENT SCAN (OUTPATIENT)
Dept: HOME HEALTH SERVICES | Facility: HOSPITAL | Age: 58
End: 2023-03-13
Payer: COMMERCIAL

## 2023-03-14 ENCOUNTER — TELEPHONE (OUTPATIENT)
Dept: VASCULAR SURGERY | Facility: CLINIC | Age: 58
End: 2023-03-14
Payer: COMMERCIAL

## 2023-03-14 NOTE — TELEPHONE ENCOUNTER
Contacted Zulema in response to message. States Shea is not in at this time. Explained to Zulema that nurse spoke with Shea on 2/24/23 in reference to request for wound measurements and explained that she should contact Hope with [facility]  for wound measurements as this was not measured in clinic. Zulema states she will notify Shea of this.   ----- Message from Jamila Weldon sent at 3/14/2023 10:48 AM CDT -----  Type: Patient Call Back    Who called:HANNA    What is the request in detail:requesting wound measurements. They will be starting the process of discontinuation of wound vac therapy since they have not had a response.     Can the clinic reply by [facility]?    Would the patient rather a call back or a response via [facility]? call    Best call back number:1  ext [phone] ref [phone]

## 2023-03-17 ENCOUNTER — TELEPHONE (OUTPATIENT)
Dept: HEMATOLOGY/ONCOLOGY | Facility: CLINIC | Age: 58
End: 2023-03-17
Payer: COMMERCIAL

## 2023-03-17 NOTE — TELEPHONE ENCOUNTER
----- Message from Zia Das sent at 3/17/2023  2:01 PM CDT -----  Name of Pharmacy: [facility]    Name of caller: Nichelle    Reason for Call: Is calling to see if the prior auth was received for [employer]    Callback number: [phone]

## 2023-03-23 ENCOUNTER — TELEPHONE (OUTPATIENT)
Dept: FAMILY MEDICINE | Facility: CLINIC | Age: 58
End: 2023-03-23
Payer: COMMERCIAL

## 2023-03-23 NOTE — TELEPHONE ENCOUNTER
"----- Message from Sandy Berumen sent at 3/23/2023 11:03 AM CDT -----  Regarding: Rebeca "spouse"  .Type: Patient Call Back    Who called: Otis" spouse"    What is the request in detail: Would like for  Dr Mccain  to call the NP at the dialysis center to verify if  pt can start of by taking appetite stimulate  "megace"to help pt start eating    Ph# [phone] ext [phone]    Can the clinic reply by [facility]? Call back     Would the patient rather a call back or a response via [facility]?  Call back     Best call back number: [phone]            "

## 2023-03-23 NOTE — TELEPHONE ENCOUNTER
Will defer to hematology/oncology if this is appropriate for her to take. If ok with them then it is ok with me.

## 2023-03-27 ENCOUNTER — HOSPITAL ENCOUNTER (OUTPATIENT)
Dept: RADIOLOGY | Facility: HOSPITAL | Age: 58
Discharge: HOME | End: 2023-03-27
Attending: INTERNAL MEDICINE
Payer: MEDICARE

## 2023-03-27 DIAGNOSIS — C64.9 METASTATIC RENAL CELL CARCINOMA, UNSPECIFIED LATERALITY: ICD-10-CM

## 2023-03-27 PROCEDURE — 74176 CT CHEST ABDOMEN PELVIS WITHOUT CONTRAST(XPD): ICD-10-PCS | Mod: 26,,, | Performed by: STUDENT IN AN ORGANIZED HEALTH CARE EDUCATION/TRAINING PROGRAM

## 2023-03-27 PROCEDURE — 74176 CT ABD & PELVIS W/O CONTRAST: CPT | Mod: 26,,, | Performed by: STUDENT IN AN ORGANIZED HEALTH CARE EDUCATION/TRAINING PROGRAM

## 2023-03-27 PROCEDURE — 71250 CT CHEST ABDOMEN PELVIS WITHOUT CONTRAST(XPD): ICD-10-PCS | Mod: 26,,, | Performed by: STUDENT IN AN ORGANIZED HEALTH CARE EDUCATION/TRAINING PROGRAM

## 2023-03-27 PROCEDURE — 71250 CT THORAX DX C-: CPT | Mod: 26,,, | Performed by: STUDENT IN AN ORGANIZED HEALTH CARE EDUCATION/TRAINING PROGRAM

## 2023-03-27 PROCEDURE — 71250 CT THORAX DX C-: CPT | Mod: TC

## 2023-03-28 NOTE — PROGRESS NOTES
"  Subjective:       Patient ID: Eva Bloom is a 57 y.o. female.    Chief Complaint: RCC    HPI     57 y.o.female, with metastatic RCC, here for f/u.  Recent hospitalization for infected HD graft, has wound vac still in place.  Feeling okay, looking forward to getting wound vac removed.      Pt is currently taking cabozantinib daily since December 2016. Pt is on ESRD with HD every TTS. Patient tolerating treatment extremely well with little to no side effects.     Today, she denies any mouth sores, nausea, vomiting, diarrhea, constipation,weight loss, chest pain, leg swelling, headache, dizziness, or mood changes.    ECOG 1. She is accompanied with her .    Oncologic History (From Chart and Patient):  Eva Bloom is a 57 y.o.female with ESRD on HD who was found to have two L renal masses. She underwent L lap nephrectomy on 1/12/16. 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 Margins were negative.  Shehealed from surgery well, but then developed a large ovarian mass.  This was removed by gyn along with multiple sites of metastatic disease in the pelvis.  Path was c/w recurrence of RCC.     11/20/16 Pelvic ultrasound reveals "Large heterogeneous cystic and solid mass which appears to arise from the right ovary with worrisome imaging features for malignancy.  Differential diagnosis includes malignant tumors such as serous or mucinous cystadenocarcinoma.  It is important to note that the patient is at risk for ovarian torsion due to the size of the mass.Multiple uterine fibroids are identified with the largest in the uterine fundus."    11/22/16 pathology reveals "FINAL PATHOLOGIC DIAGNOSIS-RIGHT OVARY AND FALLOPIAN TUBE, RIGHT SALPINGO-OOPHORECTOMY:  -Positive for malignancy, high grade carcinoma morphologically and immunohistochemically consistent with metastasis from the patient's " "known renal cell carcinoma"    Review of Systems   Constitutional:  Positive for fatigue. Negative for activity change, appetite change, chills and fever.   HENT:  Negative for congestion, hearing loss, mouth sores, postnasal drip, sore throat, tinnitus and voice change.    Eyes:  Negative for pain and visual disturbance.   Respiratory:  Negative for cough, shortness of breath and wheezing.    Cardiovascular:  Negative for chest pain, palpitations and leg swelling.   Gastrointestinal:  Negative for abdominal pain, constipation, diarrhea, nausea and vomiting.   Endocrine: Negative for cold intolerance and heat intolerance.   Genitourinary:  Negative for difficulty urinating, dyspareunia, dysuria, frequency, menstrual problem, urgency, vaginal bleeding, vaginal discharge and vaginal pain.   Musculoskeletal:  Negative for arthralgias and myalgias.   Skin:  Negative for color change, rash and wound.   Allergic/Immunologic: Negative for environmental allergies and food allergies.   Neurological:  Negative for weakness, numbness and headaches.   Hematological:  Negative for adenopathy. Does not bruise/bleed easily.   Psychiatric/Behavioral:  Negative for agitation, confusion, hallucinations and sleep disturbance. The patient is not nervous/anxious.    All other systems reviewed and are negative.    Allergies:  Review of patient's allergies indicates:   Allergen Reactions    Allopurinol      Other reaction(s): abnormal transaminases       Medications:  Current Outpatient Medications   Medication Sig Dispense Refill    acetaminophen (TYLENOL) 500 MG tablet Take 500 mg by mouth every 6 (six) hours as needed for Pain.      apixaban (ELIQUIS) 2.5 mg Tab Take 1 tablet (2.5 mg total) by mouth 2 (two) times daily. 60 tablet 11    cabozantinib (CABOMETYX) 60 mg Tab TAKE ONE TABLET BY MOUTH ONCE DAILY AT THE SAME TIME ON AN EMPTY STOMACH AT LEAST 1 HOUR BEFORE OR 2 HOURS AFTER EATING. AVOID GRAPEFRUIT PRODUCTS 30 tablet 4    " cloNIDine 0.3 mg/24 hr td ptwk (CATAPRES) 0.3 mg/24 hr Place 1 patch onto the skin every 7 days. 4 patch 11    gabapentin (NEURONTIN) 100 MG capsule Take 1 capsule (100 mg total) by mouth once daily. 30 capsule 11    hydrALAZINE (APRESOLINE) 100 MG tablet Take 1 tablet (100 mg total) by mouth every 12 (twelve) hours. 180 tablet 2    HYDROcodone-acetaminophen (NORCO) 5-325 mg per tablet Take 1 tablet by mouth every 4 (four) hours as needed for Pain. Causes drowsiness. Do not drive or operate machinery with this medication. Do not drink alcohol with this medication. Causes constipation. Take with stool softener. 10 tablet 0    lidocaine-prilocaine (EMLA) cream APPLY ATLEAST 30 MINUTES BEFORE TREATMENT 3 TIMES A WEEK 30 g 11    metoprolol succinate (TOPROL-XL) 100 MG 24 hr tablet Take 1/2 tablet (50mg) once daily 90 tablet 3    mv,Ca,min-folic acid-vit K1 (ONE-A-DAY WOMEN'S 50 PLUS) 400-20 mcg Tab Take 1 tablet by mouth once daily.      naloxone (NARCAN) 1 mg/mL injection 2 mg (1 mg per nostril) by Nasal route as needed for opioid overdose; may repeat in 3 to 5 minutes if not effective. Call 911 2 mL 11    oxyCODONE (OXY-IR) 5 mg Cap Take 1 capsule (5 mg total) by mouth every 6 (six) hours as needed for Pain. 12 capsule 0    polyethylene glycol (GLYCOLAX) 17 gram/dose powder Take 17 g by mouth once daily. 235 g 0    promethazine (PHENERGAN) 25 MG tablet Take 1 tablet (25 mg total) by mouth every 6 (six) hours as needed for Nausea. 15 tablet 0    sacubitriL-valsartan (ENTRESTO) 49-51 mg per tablet Take 1 tablet by mouth 2 (two) times daily. 60 tablet 11    sodium chloride 0.9% SolP 100 mL with iron sucrose 100 mg iron/5 mL Soln 100 mg 50 mg every Tues, Thurs, Sat.       No current facility-administered medications for this visit.       PMH:  Past Medical History:   Diagnosis Date    Anemia     Bronchitis 03/01/2017    Cancer 2016    kidney cancer    CHF (congestive heart failure), NYHA class II, chronic, systolic      CMV (cytomegalovirus) antibody positive     Essential hypertension 9/23/2015    H/O herpes simplex type 2 infection     Herpes simplex type 1 antibody positive     History of kidney cancer     s/p left nephrectomy 1/2016    Hyperparathyroidism, unspecified     Hyperuricemia without signs of inflammatory arthritis and tophaceous disease     Kidney stones     LGSIL (low grade squamous intraepithelial dysplasia)     Myocardiopathy 7/21/2017    Prediabetes     Proteinuria     Renal disorder     Thyroid nodule     Urate nephropathy        PSH:  Past Surgical History:   Procedure Laterality Date    BREAST CYST EXCISION      COLONOSCOPY N/A 11/12/2015    COLONOSCOPY N/A 3/12/2021    Procedure: COLONOSCOPY;  Surgeon: Brendon Lanier MD;  Location: [facility];  Service: Endoscopy;  Laterality: N/A;  covid test 3/9, labs prior, prep instr mailed -ml    INSERTION OF BIVENTRICULAR IMPLANTABLE CARDIOVERTER-DEFIBRILLATOR (ICD)  04/2021    INSERTION OF TUNNELED CENTRAL VENOUS CATHETER (CVC) WITH SUBCUTANEOUS PORT N/A 1/25/2023    Procedure: INSERTION, DUAL LUMEN CATHETER WITH PORT, WITH IMAGING GUIDANCE;  Surgeon: FARIDEH Muñoz III, MD;  Location: [facility] OR [address] [place] [address];  Service: Peripheral Vascular;  Laterality: N/A;  possinble permcath placment    NEPHRECTOMY-LAPAROSCOPIC Left 01/12/2016    PERITONEAL CATHETER INSERTION      Permacath insertion  01/12/2017    PLACEMENT OF ARTERIOVENOUS GRAFT  12/28/2022    Procedure: INSERTION, GRAFT, ARTERIOVENOUS;  Surgeon: FARIDEH Muñoz III, MD;  Location: [facility] OR [address] [place] [address];  Service: Peripheral Vascular;;    REMOVAL, GRAFT, ARTERIOVENOUS, UPPER EXTREMITY Left 1/25/2023    Procedure: REMOVAL, GRAFT, ARTERIOVENOUS, UPPER EXTREMITY;  Surgeon: FARIDEH Muñoz III, MD;  [facility];  Service: Peripheral Vascular;  Laterality: Left;    REVISION OF PROCEDURE INVOLVING ARTERIOVENOUS GRAFT Left 12/28/2022    Procedure: REVISION, PROCEDURE INVOLVING ARTERIOVENOUS GRAFT;  Surgeon: FARIDEH CHEN  Toni SAM MD;  Location: [facility] OR [address] [place] [address];  Service: Peripheral Vascular;  Laterality: Left;    SALPINGOOPHORECTOMY Right 2016    KJB---DAVINCI    TONSILLECTOMY      TUBAL LIGATION         FamHx:  Family History   Problem Relation Age of Onset    Hypertension Mother     Diabetes Father     Kidney disease Father     No Known Problems Son     No Known Problems Son     Diabetes Maternal Grandfather     No Known Problems Sister     No Known Problems Brother     No Known Problems Maternal Grandmother     No Known Problems Paternal Grandmother     No Known Problems Paternal Grandfather     Heart disease Sister     No Known Problems Sister     No Known Problems Brother     No Known Problems Maternal Aunt     No Known Problems Maternal Uncle     No Known Problems Paternal Aunt     No Known Problems Paternal Uncle     Breast cancer Neg Hx     Colon cancer Neg Hx     Cancer Neg Hx     Stroke Neg Hx     Amblyopia Neg Hx     Blindness Neg Hx     Cataracts Neg Hx     Glaucoma Neg Hx     Macular degeneration Neg Hx     Retinal detachment Neg Hx     Strabismus Neg Hx     Thyroid disease Neg Hx        SocHx:  Social History     Socioeconomic History    Marital status:     Number of children: 2   Occupational History    Occupation: [employer]     Employer: [id number]   Tobacco Use    Smoking status: Never    Smokeless tobacco: Never   Substance and Sexual Activity    Alcohol use: No     Comment: . 2 children. works at [employer].    Drug use: No    Sexual activity: Yes     Partners: Male     Social Determinants of Health     Financial Resource Strain: Low Risk     Difficulty of Paying Living Expenses: Not hard at all   Food Insecurity: No Food Insecurity    Worried About Running Out of Food in the Last Year: Never true    Ran Out of Food in the Last Year: Never true   Transportation Needs: No Transportation Needs    Lack of Transportation (Medical): No    Lack of Transportation (Non-Medical): No   Physical  Activity: Insufficiently Active    Days of Exercise per Week: 3 days    Minutes of Exercise per Session: 30 min   Stress: No Stress Concern Present    Feeling of Stress : Not at all   Social Connections: Socially Integrated    Frequency of Communication with Friends and Family: More than three times a week    Frequency of Social Gatherings with Friends and Family: Once a week    Attends Advent Services: 1 to 4 times per year    Active Member of Clubs or Organizations: Yes    Attends Club or Organization Meetings: 1 to 4 times per year    Marital Status:    Housing Stability: Low Risk     Unable to Pay for Housing in the Last Year: No    Number of Places Lived in the Last Year: 1    Unstable Housing in the Last Year: No         Objective:     Vitals:    03/29/23 0919   BP: 133/76   Pulse: 71   Resp: 16   Temp: 98.4 °F (36.9 °C)         Physical Exam  Vitals and nursing note reviewed.   Constitutional:       General: She is not in acute distress.     Appearance: She is well-developed.      Comments: Thin appearance   HENT:      Head: Normocephalic and atraumatic.      Nose: Nose normal.      Mouth/Throat:      Pharynx: No oropharyngeal exudate.   Eyes:      General:         Right eye: No discharge.         Left eye: No discharge.      Conjunctiva/sclera: Conjunctivae normal.      Pupils: Pupils are equal, round, and reactive to light.   Neck:      Trachea: No tracheal deviation.   Cardiovascular:      Comments: No swelling to bilateral lower extremities.   Musculoskeletal:         General: No tenderness. Normal range of motion.      Comments:      Skin:     General: Skin is warm and dry.      Coloration: Skin is not pale.      Findings: No erythema or rash.   Neurological:      Mental Status: She is alert and oriented to person, place, and time.   Psychiatric:         Behavior: Behavior normal.         Thought Content: Thought content normal.      LABS:  WBC   Date Value Ref Range Status   03/27/2023 5.69  3.90 - 12.70 K/uL Final     Hemoglobin   Date Value Ref Range Status   03/27/2023 10.9 (L) 12.0 - 16.0 g/dL Final     Hematocrit   Date Value Ref Range Status   03/27/2023 37.2 37.0 - 48.5 % Final     Platelets   Date Value Ref Range Status   03/27/2023 155 150 - 450 K/uL Final       Chemistry        Component Value Date/Time     03/27/2023 0824    K 3.6 03/27/2023 0824     03/27/2023 0824    CO2 24 03/27/2023 0824    BUN 46 (H) 03/27/2023 0824    CREATININE 5.7 (H) 03/27/2023 0824    GLU 93 03/27/2023 0824        Component Value Date/Time    CALCIUM 9.3 03/27/2023 0824    ALKPHOS 248 (H) 03/27/2023 0824    AST 31 03/27/2023 0824    ALT 11 03/27/2023 0824    BILITOT 0.5 03/27/2023 0824        CT Chest Abdomen Pelvis Without Contrast (XPD)  Narrative: EXAMINATION:  CT CHEST ABDOMEN PELVIS WITHOUT CONTRAST(XPD)    CLINICAL HISTORY:  Kidney cancer, monitor;Malignant neoplasm of unspecified kidney, except renal pelvis    TECHNIQUE:  Low dose axial images, sagittal and coronal reformations were obtained from the thoracic inlet to the pubic synthesis .  Oral contrast was not administered.    COMPARISON:  CT chest/abdomen/pelvis from 11/23/2022.  Multiple additional prior exams.    FINDINGS:  Assessment limited due to lack of visceral fat and IV contrast.    Left-sided central venous catheter with tip in the right atrium.  Stable left lower lateral chest wall pacemaker device.  Heterogeneous thyroid with scattered calcifications.  Prominent vasculature in the left axilla with partially visualized graft/stent in place.    Cardiomegaly.  Moderate volume pericardial fluid, similar previous exam.  Calcific coronary atherosclerosis.    Advanced calcific atherosclerosis of thoracic aorta and major branch vessels.  No aneurysm.    No new suspicious mediastinal or hilar lymphadenopathy.    Central airways are patent.    Stable lung volumes noting compressive atelectasis in the lingula and left lung base related to  cardiomegaly.  Persistent clustered micro nodules with tree-in-bud configuration in the anterior right upper lobe suggestive of small airways disease.  Calcified granuloma in the right middle lobe.  Scattered interlobular wall thickening and mild patchy ground-glass, likely represents edema.  Persistent 6 mm nodule at the right lung base (series 4, image 235), previously measured 5 mm.  No new suspicious nodule or mass.    Liver is normal in size.  1.6 cm round hypodensity within the hepatic dome, nonspecific although unchanged as compared to previous MRI abdomen and favored to reflect hemangioma or cyst.  No definite new suspicious lesion noting noncontrast assessment.    Persistent cholelithiasis.  No wall thickening or surrounding inflammatory changes to suggest acute cholecystitis.  No abnormal biliary duct dilatation.    Pancreas and spleen are unremarkable.  Stable appearance of bilateral adrenal glands.    Atrophic right kidney with multiple fluid attenuating hypodensities compatible with cysts.  No hydronephrosis.    Left kidney is surgically absent.  Soft tissue fullness near resection site, similar to multiple previous exams (series 2, image 26).  May represent volume averaging related to prominent vasculature and pancreatic tail in this region although difficult to exclude local recurrence noting suboptimal noncontrast evaluation.    Bladder is predominantly nondistended.  Stable appearance of uterus and adnexa noting prominent calcified vasculature.    Normal caliber small bowel.  Moderate to large volume colonic stool burden suggestive of constipation.  Questionable mild circumferential wall thickening versus incomplete distension of distal sigmoid colon and rectum, similar to previous exam.  No evidence of bowel obstruction.    No free intraperitoneal air.  Possible trace ascites.    No suspicious lymphadenopathy.    Advanced calcific aortic atherosclerosis.  No aneurysm.    Diffuse body wall  edema.    Mild degenerative change.  No acute fracture.  No aggressive osseous lesion.  Impression: Stable exam.  Postoperative changes status post left nephrectomy noting area of soft tissue fullness near resection site as above, recommend close attention on follow-up.  Assessment limited without IV contrast.    Stable hypodensity within the hepatic dome, better characterized on previous MRI abdomen.    Stable subcentimeter nodule in the right lower lobe.  Attention on follow-up.    Questionable mild circumferential wall thickening versus incomplete distension of sigmoid colon and rectum, similar previous exam.  Proctocolitis not excluded.    Mild pulmonary edema, pericardial effusion, and diffuse body wall edema.    Cholelithiasis.    Additional findings as above.    Electronically signed by: Easton Tian  Date:    03/27/2023  Time:    09:07      Assessment:       1. Hypothyroidism, unspecified type    2. Metastatic renal cell carcinoma, unspecified laterality    3. ESRD (end stage renal disease) on dialysis            Plan:       Metastatic Renal Cell Carcinoma:    It is unlikely that surgery removed all sites of metastatic disease. Given that this is c/w her previous papillary disease, pt started on oral cabozantinib 60mg daily (for dual VEGF and MET inhibition), especially given recent data that shows cabo is far superior to Sutent in the first line setting.  If pt progresses on this regimen, will switch to nivolumab.     Scans still with SD.  Pt tolerating well.  TSH up today, will start Synthroid and recheck TSH in a few weeks.     RTC in 6 wks labs at [facility] (CBC,CMP, TSH) on [facility] as well and to see me.  ALL appts MUST be on a Monday, Wednesday, or Friday ONLY (pt has dialysis on Tuesday and Thursday).  Pt prefers early morning appts.       Patient is in agreement with the proposed treatment plan. All questions were answered to the patient's satisfaction. Pt knows to call clinic if anything is  needed before the next clinic visit.     More than 40 mins total time were spent during this encounter.      Ben Rivera M.D., M.S., F.A.C.P.  Hematology/Oncology Attending  [facility]           Route Chart for Scheduling    Med Onc Chart Routing      Follow up with physician 6 weeks. RTC in 6 wks labs at [facility] (CBC,CMP, TSH) on [facility] as well and to see me.  ALL appts MUST be on a Monday, Wednesday, or Friday ONLY (pt has dialysis on Tuesday and Thursday).  Pt prefers early morning appts.   Follow up with [facility]    Infusion scheduling note    Injection scheduling note    Labs CBC, CMP and TSH   Scheduling:  Preferred lab:  Lab interval:     Imaging    Pharmacy appointment    Other referrals

## 2023-03-29 ENCOUNTER — OFFICE VISIT (OUTPATIENT)
Dept: HEMATOLOGY/ONCOLOGY | Facility: CLINIC | Age: 58
End: 2023-03-29
Payer: MEDICARE

## 2023-03-29 VITALS
TEMPERATURE: 98 F | HEART RATE: 71 BPM | RESPIRATION RATE: 16 BRPM | HEIGHT: 64 IN | OXYGEN SATURATION: 99 % | DIASTOLIC BLOOD PRESSURE: 76 MMHG | SYSTOLIC BLOOD PRESSURE: 133 MMHG | BODY MASS INDEX: 16.56 KG/M2 | WEIGHT: 97 LBS

## 2023-03-29 DIAGNOSIS — N18.6 ESRD (END STAGE RENAL DISEASE) ON DIALYSIS: ICD-10-CM

## 2023-03-29 DIAGNOSIS — E03.9 HYPOTHYROIDISM, UNSPECIFIED TYPE: Primary | ICD-10-CM

## 2023-03-29 DIAGNOSIS — C64.9 METASTATIC RENAL CELL CARCINOMA, UNSPECIFIED LATERALITY: ICD-10-CM

## 2023-03-29 DIAGNOSIS — Z99.2 ESRD (END STAGE RENAL DISEASE) ON DIALYSIS: ICD-10-CM

## 2023-03-29 PROCEDURE — 99215 OFFICE O/P EST HI 40 MIN: CPT | Mod: S$PBB,,, | Performed by: INTERNAL MEDICINE

## 2023-03-29 PROCEDURE — 99214 OFFICE O/P EST MOD 30 MIN: CPT | Mod: PBBFAC | Performed by: INTERNAL MEDICINE

## 2023-03-29 PROCEDURE — 99999 PR PBB SHADOW E&M-EST. PATIENT-LVL IV: CPT | Mod: PBBFAC,,, | Performed by: INTERNAL MEDICINE

## 2023-03-29 PROCEDURE — 99999 PR PBB SHADOW E&M-EST. PATIENT-LVL IV: ICD-10-PCS | Mod: PBBFAC,,, | Performed by: INTERNAL MEDICINE

## 2023-03-29 PROCEDURE — 99215 PR OFFICE/OUTPT VISIT, EST, LEVL V, 40-54 MIN: ICD-10-PCS | Mod: S$PBB,,, | Performed by: INTERNAL MEDICINE

## 2023-03-29 RX ORDER — LEVOTHYROXINE SODIUM 75 UG/1
75 TABLET ORAL DAILY
Qty: 30 TABLET | Refills: 11 | Status: SHIPPED | OUTPATIENT
Start: 2023-03-29 | End: 2024-02-01

## 2023-03-30 ENCOUNTER — PATIENT OUTREACH (OUTPATIENT)
Dept: HOME HEALTH SERVICES | Facility: HOSPITAL | Age: 58
End: 2023-03-30
Payer: COMMERCIAL

## 2023-03-30 ENCOUNTER — EXTERNAL HOME HEALTH (OUTPATIENT)
Dept: HOME HEALTH SERVICES | Facility: HOSPITAL | Age: 58
End: 2023-03-30
Payer: COMMERCIAL

## 2023-03-31 ENCOUNTER — OFFICE VISIT (OUTPATIENT)
Dept: VASCULAR SURGERY | Facility: CLINIC | Age: 58
End: 2023-03-31
Payer: MEDICARE

## 2023-03-31 VITALS
DIASTOLIC BLOOD PRESSURE: 68 MMHG | WEIGHT: 97 LBS | BODY MASS INDEX: 16.56 KG/M2 | HEART RATE: 73 BPM | SYSTOLIC BLOOD PRESSURE: 140 MMHG | HEIGHT: 64 IN | TEMPERATURE: 98 F

## 2023-03-31 DIAGNOSIS — N18.6 ESRD (END STAGE RENAL DISEASE): Primary | ICD-10-CM

## 2023-03-31 PROCEDURE — 99999 PR PBB SHADOW E&M-EST. PATIENT-LVL IV: CPT | Mod: PBBFAC,,, | Performed by: SURGERY

## 2023-03-31 PROCEDURE — 99024 POSTOP FOLLOW-UP VISIT: CPT | Mod: POP,,, | Performed by: SURGERY

## 2023-03-31 PROCEDURE — 99999 PR PBB SHADOW E&M-EST. PATIENT-LVL IV: ICD-10-PCS | Mod: PBBFAC,,, | Performed by: SURGERY

## 2023-03-31 PROCEDURE — 99214 OFFICE O/P EST MOD 30 MIN: CPT | Mod: PBBFAC | Performed by: SURGERY

## 2023-03-31 PROCEDURE — 99024 PR POST-OP FOLLOW-UP VISIT: ICD-10-PCS | Mod: POP,,, | Performed by: SURGERY

## 2023-03-31 NOTE — PROGRESS NOTES
VASCULAR SURGERY SERVICE    CHIEF COMPLAINT:  Functional left AV graft dysfunctional left AV graft    HISTORY OF PRESENT ILLNESS: Eva Bloom is a 57 y.o. female end-stage renal disease, with multiple serious medical comorbidities including admission for sepsis 2 months ago, EF 20%, AFib on Eliquis, who is having increasing bleeding after her dialysis runs.  I placed a revision, left AV graft with long interposition Accu Seal 12/28/2022.  She has had uninterrupted use of this graft ever since.    Recent weeks she is had increased bleeding after dialysis sticks    S/p:  Excision, excluded L AVG and permacath 1/25/2023  revision, left AV graft with long interposition Accu Seal 12/28/2022  Original left AV graft 2017 with subsequent covered stent placement in venous outflow    03/10/2023:  She continues use the wound VAC on the medial excised site, with Medihoney to the small down the lateral counter incision of the new graft    3/31/2023:  2 week follow-up.  She is without complaint    Past Medical History:   Diagnosis Date    Anemia     Bronchitis 03/01/2017    Cancer 2016    kidney cancer    CHF (congestive heart failure), NYHA class II, chronic, systolic     CMV (cytomegalovirus) antibody positive     Essential hypertension 9/23/2015    H/O herpes simplex type 2 infection     Herpes simplex type 1 antibody positive     History of kidney cancer     s/p left nephrectomy 1/2016    Hyperparathyroidism, unspecified     Hyperuricemia without signs of inflammatory arthritis and tophaceous disease     Kidney stones     LGSIL (low grade squamous intraepithelial dysplasia)     Myocardiopathy 7/21/2017    Prediabetes     Proteinuria     Renal disorder     Thyroid nodule     Urate nephropathy        Past Surgical History:   Procedure Laterality Date    BREAST CYST EXCISION      COLONOSCOPY N/A 11/12/2015    COLONOSCOPY N/A 3/12/2021    Procedure: COLONOSCOPY;  Surgeon: Brendon Lanier MD;  Location: [facility];   Service: Endoscopy;  Laterality: N/A;  covid test 3/9, labs prior, prep instr mailed -ml    INSERTION OF BIVENTRICULAR IMPLANTABLE CARDIOVERTER-DEFIBRILLATOR (ICD)  04/2021    INSERTION OF TUNNELED CENTRAL VENOUS CATHETER (CVC) WITH SUBCUTANEOUS PORT N/A 1/25/2023    Procedure: INSERTION, DUAL LUMEN CATHETER WITH PORT, WITH IMAGING GUIDANCE;  Surgeon: FARIDEH Muñoz III, MD;  Location: [facility] OR 2ND FLR;  Service: Peripheral Vascular;  Laterality: N/A;  possinble permcath placment    NEPHRECTOMY-LAPAROSCOPIC Left 01/12/2016    PERITONEAL CATHETER INSERTION      Permacath insertion  01/12/2017    PLACEMENT OF ARTERIOVENOUS GRAFT  12/28/2022    Procedure: INSERTION, GRAFT, ARTERIOVENOUS;  Surgeon: FARIDEH Muñoz III, MD;  Location: [facility] OR 2ND FLR;  Service: Peripheral Vascular;;    REMOVAL, GRAFT, ARTERIOVENOUS, UPPER EXTREMITY Left 1/25/2023    Procedure: REMOVAL, GRAFT, ARTERIOVENOUS, UPPER EXTREMITY;  Surgeon: FARIDEH Muñoz III, MD;  Location: [facility] OR 2ND FLR;  Service: Peripheral Vascular;  Laterality: Left;    REVISION OF PROCEDURE INVOLVING ARTERIOVENOUS GRAFT Left 12/28/2022    Procedure: REVISION, PROCEDURE INVOLVING ARTERIOVENOUS GRAFT;  Surgeon: FARIDEH Muñoz III, MD;  Location: [facility] OR 2ND FLR;  Service: Peripheral Vascular;  Laterality: Left;    SALPINGOOPHORECTOMY Right 2016    KJB---DAVINCI    TONSILLECTOMY      TUBAL LIGATION           Current Outpatient Medications:     acetaminophen (TYLENOL) 500 MG tablet, Take 500 mg by mouth every 6 (six) hours as needed for Pain., Disp: , Rfl:     apixaban (ELIQUIS) 2.5 mg Tab, Take 1 tablet (2.5 mg total) by mouth 2 (two) times daily., Disp: 60 tablet, Rfl: 11    cabozantinib (CABOMETYX) 60 mg Tab, TAKE ONE TABLET BY MOUTH ONCE DAILY AT THE SAME TIME ON AN EMPTY STOMACH AT LEAST 1 HOUR BEFORE OR 2 HOURS AFTER EATING. AVOID GRAPEFRUIT PRODUCTS, Disp: 30 tablet, Rfl: 4    cloNIDine 0.3 mg/24 hr td ptwk (CATAPRES) 0.3 mg/24 hr, Place 1 patch onto the skin every  7 days., Disp: 4 patch, Rfl: 11    gabapentin (NEURONTIN) 100 MG capsule, Take 1 capsule (100 mg total) by mouth once daily., Disp: 30 capsule, Rfl: 11    HYDROcodone-acetaminophen (NORCO) 5-325 mg per tablet, Take 1 tablet by mouth every 4 (four) hours as needed for Pain. Causes drowsiness. Do not drive or operate machinery with this medication. Do not drink alcohol with this medication. Causes constipation. Take with stool softener., Disp: 10 tablet, Rfl: 0    levothyroxine (SYNTHROID) 75 MCG tablet, Take 1 tablet (75 mcg total) by mouth once daily., Disp: 30 tablet, Rfl: 11    lidocaine-prilocaine (EMLA) cream, APPLY ATLEAST 30 MINUTES BEFORE TREATMENT 3 TIMES A WEEK, Disp: 30 g, Rfl: 11    metoprolol succinate (TOPROL-XL) 100 MG 24 hr tablet, Take 1/2 tablet (50mg) once daily, Disp: 90 tablet, Rfl: 3    mv,Ca,min-folic acid-vit K1 (ONE-A-DAY WOMEN'S 50 PLUS) 400-20 mcg Tab, Take 1 tablet by mouth once daily., Disp: , Rfl:     naloxone (NARCAN) 1 mg/mL injection, 2 mg (1 mg per nostril) by Nasal route as needed for opioid overdose; may repeat in 3 to 5 minutes if not effective. Call 911, Disp: 2 mL, Rfl: 11    oxyCODONE (OXY-IR) 5 mg Cap, Take 1 capsule (5 mg total) by mouth every 6 (six) hours as needed for Pain., Disp: 12 capsule, Rfl: 0    polyethylene glycol (GLYCOLAX) 17 gram/dose powder, Take 17 g by mouth once daily., Disp: 235 g, Rfl: 0    promethazine (PHENERGAN) 25 MG tablet, Take 1 tablet (25 mg total) by mouth every 6 (six) hours as needed for Nausea., Disp: 15 tablet, Rfl: 0    sacubitriL-valsartan (ENTRESTO) 49-51 mg per tablet, Take 1 tablet by mouth 2 (two) times daily., Disp: 60 tablet, Rfl: 11    sodium chloride 0.9% SolP 100 mL with iron sucrose 100 mg iron/5 mL Soln 100 mg, 50 mg every Tues, Thurs, Sat., Disp: , Rfl:     hydrALAZINE (APRESOLINE) 100 MG tablet, Take 1 tablet (100 mg total) by mouth every 12 (twelve) hours., Disp: 180 tablet, Rfl: 2    Review of patient's allergies indicates:  "  Allergen Reactions    Coreg [carvedilol] Other (See Comments)     Nausea/vomiting    Allopurinol      Other reaction(s): abnormal transaminases       Family History   Problem Relation Age of Onset    Hypertension Mother     Diabetes Father     Kidney disease Father     No Known Problems Son     No Known Problems Son     Diabetes Maternal Grandfather     No Known Problems Sister     No Known Problems Brother     No Known Problems Maternal Grandmother     No Known Problems Paternal Grandmother     No Known Problems Paternal Grandfather     Heart disease Sister     No Known Problems Sister     No Known Problems Brother     No Known Problems Maternal Aunt     No Known Problems Maternal Uncle     No Known Problems Paternal Aunt     No Known Problems Paternal Uncle     Breast cancer Neg Hx     Colon cancer Neg Hx     Cancer Neg Hx     Stroke Neg Hx     Amblyopia Neg Hx     Blindness Neg Hx     Cataracts Neg Hx     Glaucoma Neg Hx     Macular degeneration Neg Hx     Retinal detachment Neg Hx     Strabismus Neg Hx     Thyroid disease Neg Hx        Social History     Tobacco Use    Smoking status: Never    Smokeless tobacco: Never   Substance Use Topics    Alcohol use: No     Comment: . 2 children. works at [employer].    Drug use: No       PHYSICAL EXAM:   BP (!) 140/68 (BP Location: Right arm, Patient Position: Sitting, BP Method: Large (Automatic))   Pulse 73   Temp 98.4 °F (36.9 °C) (Oral)   Ht 5' 4" (1.626 m)   Wt 44 kg (97 lb)   LMP 10/24/2016   BMI 16.65 kg/m²   Constitutional:  Alert,   Well-appearing  In no distress.   Neurological: Normal speech  no focal findings  CN II - XII grossly intact.    Psychiatric: Mood and affect appropriate and symmetric.   HEENT: Normocephalic / atraumatic  PERRLA  Midline trachea  No scars across the neck   Cardiac: Regular rate and rhythm.   Pulmonary: Normal pulmonary effort.   Abdomen: Soft, not distended.     Skin: Warm and well perfused.    Vascular:  Radial pulses " are nonpalpable bilaterally.   Extremities/  Musculoskeletal: No edema.   Left arm demonstrates thrill but now with some mild pulsatility in the laterally tunneled graft.  The excised area is showing some fibrinous exudate the upper areas.  The lateral counter incision now appears completely healed.  See picture   3/31/2023          IMAGING:  Prior Duplex of the left AV graft showed to be patent without stenosis with a flow volume of 1.78 L      IMPRESSION:  8 weeks status post excision, infected excluded AV graft.  Persistent fibrinous area in the upper part of the excised area.  Counter incision now completely healed    PLAN  .  Wound VAC, begin Medihoney to excised area to be changed daily.  Lateral counter incision dry dressing only  Follow-up in 2 weeks with AV graft duplex, also follow up with wound care    MANPREET Muñoz III, MD, FACS  Professor and Chief, Vascular and Endovascular Surgery

## 2023-04-05 NOTE — PATIENT INSTRUCTIONS
1. Stop nifedipine (procardia-XL)  2. Stop nebivolol (bystolic)  3. Start carvedilol (coreg) 12.5 mg twice daily  4. Start isosorbide/hydralazine (bidil) 20/37.5 mg three times daily  5. Weigh yourself daily; should you gain 3 lbs in 24 hrs or 5 lbs in 1 week start lasix 40 mg daily until weight decrease to dry weight and discuss with nephrologist about taking more fluid off during dialysis  6. Take your blood pressure and heart rate twice daily for 1 week and call the clinic with the results  7. Obtain PET stress test   Discharged

## 2023-04-06 ENCOUNTER — TELEPHONE (OUTPATIENT)
Dept: VASCULAR SURGERY | Facility: CLINIC | Age: 58
End: 2023-04-06
Payer: COMMERCIAL

## 2023-04-06 NOTE — TELEPHONE ENCOUNTER
Contacted Leonardo with [facility]  in response to message. States she needs new wound care orders as pt's wound vac was removed during clinic visit on Friday 3/31/23. Reviewed Dr. Muñoz's instructions to apply Medihoney to left arm excised area and changing dressing daily and to apply dry gauze dressing to counter incision and changing this daily. Leonardo repeated instructions and verbalized understanding.

## 2023-04-14 ENCOUNTER — OFFICE VISIT (OUTPATIENT)
Dept: VASCULAR SURGERY | Facility: CLINIC | Age: 58
End: 2023-04-14
Payer: MEDICARE

## 2023-04-14 ENCOUNTER — TELEPHONE (OUTPATIENT)
Dept: VASCULAR SURGERY | Facility: CLINIC | Age: 58
End: 2023-04-14

## 2023-04-14 ENCOUNTER — HOSPITAL ENCOUNTER (OUTPATIENT)
Dept: VASCULAR SURGERY | Facility: CLINIC | Age: 58
Discharge: HOME | End: 2023-04-14
Attending: SURGERY
Payer: MEDICARE

## 2023-04-14 VITALS
HEART RATE: 80 BPM | DIASTOLIC BLOOD PRESSURE: 79 MMHG | SYSTOLIC BLOOD PRESSURE: 150 MMHG | BODY MASS INDEX: 16.56 KG/M2 | WEIGHT: 97 LBS | HEIGHT: 64 IN | TEMPERATURE: 98 F

## 2023-04-14 DIAGNOSIS — N18.6 ESRD (END STAGE RENAL DISEASE): ICD-10-CM

## 2023-04-14 DIAGNOSIS — Z99.2 ESRD (END STAGE RENAL DISEASE) ON DIALYSIS: Primary | ICD-10-CM

## 2023-04-14 DIAGNOSIS — N18.6 ESRD (END STAGE RENAL DISEASE) ON DIALYSIS: Primary | ICD-10-CM

## 2023-04-14 DIAGNOSIS — T82.858D ARTERIOVENOUS FISTULA STENOSIS, SUBSEQUENT ENCOUNTER: Primary | ICD-10-CM

## 2023-04-14 DIAGNOSIS — T82.858D STENOSIS OF ARTERIOVENOUS DIALYSIS FISTULA, SUBSEQUENT ENCOUNTER: Primary | ICD-10-CM

## 2023-04-14 PROCEDURE — 99024 POSTOP FOLLOW-UP VISIT: CPT | Mod: POP,,, | Performed by: SURGERY

## 2023-04-14 PROCEDURE — 99999 PR PBB SHADOW E&M-EST. PATIENT-LVL IV: ICD-10-PCS | Mod: PBBFAC,,, | Performed by: SURGERY

## 2023-04-14 PROCEDURE — 99999 PR PBB SHADOW E&M-EST. PATIENT-LVL IV: CPT | Mod: PBBFAC,,, | Performed by: SURGERY

## 2023-04-14 PROCEDURE — 93990 PR DUPLEX HEMODIALYSIS ACCESS: ICD-10-PCS | Mod: 26,S$PBB,, | Performed by: SURGERY

## 2023-04-14 PROCEDURE — 99214 OFFICE O/P EST MOD 30 MIN: CPT | Mod: PBBFAC | Performed by: SURGERY

## 2023-04-14 PROCEDURE — 93990 DOPPLER FLOW TESTING: CPT | Mod: 26,S$PBB,, | Performed by: SURGERY

## 2023-04-14 PROCEDURE — 93990 DOPPLER FLOW TESTING: CPT | Mod: PBBFAC | Performed by: SURGERY

## 2023-04-14 PROCEDURE — 99024 PR POST-OP FOLLOW-UP VISIT: ICD-10-PCS | Mod: POP,,, | Performed by: SURGERY

## 2023-04-14 RX ORDER — CEFAZOLIN SODIUM 2 G/50ML
2 SOLUTION INTRAVENOUS
Status: CANCELLED | OUTPATIENT
Start: 2023-04-14

## 2023-04-14 RX ORDER — SODIUM CHLORIDE 9 MG/ML
INJECTION, SOLUTION INTRAVENOUS CONTINUOUS
Status: CANCELLED | OUTPATIENT
Start: 2023-04-14

## 2023-04-14 NOTE — PROGRESS NOTES
VASCULAR SURGERY SERVICE    CHIEF COMPLAINT:  Functional left AV graft dysfunctional left AV graft    HISTORY OF PRESENT ILLNESS: Eva Bloom is a 57 y.o. female end-stage renal disease, with multiple serious medical comorbidities including admission for sepsis 2 months ago, EF 20%, AFib on Eliquis, who is having increasing bleeding after her dialysis runs.  I placed a revision, left AV graft with long interposition Accu Seal 12/28/2022.  She has had uninterrupted use of this graft ever since.    Recent weeks she is had increased bleeding after dialysis sticks    S/p:  Excision, excluded L AVG and permacath 1/25/2023  revision, left AV graft with long interposition Accu Seal 12/28/2022  Original left AV graft 2017 with subsequent covered stent placement in venous outflow    03/10/2023:  She continues use the wound VAC on the medial excised site, with Medihoney to the small down the lateral counter incision of the new graft    3/31/2023:  2 week follow-up.  She is without complaint    04/14/2023:  She is without complaint.  Continues Medihoney on the wound    Past Medical History:   Diagnosis Date    Anemia     Bronchitis 03/01/2017    Cancer 2016    kidney cancer    CHF (congestive heart failure), NYHA class II, chronic, systolic     CMV (cytomegalovirus) antibody positive     Essential hypertension 9/23/2015    H/O herpes simplex type 2 infection     Herpes simplex type 1 antibody positive     History of kidney cancer     s/p left nephrectomy 1/2016    Hyperparathyroidism, unspecified     Hyperuricemia without signs of inflammatory arthritis and tophaceous disease     Kidney stones     LGSIL (low grade squamous intraepithelial dysplasia)     Myocardiopathy 7/21/2017    Prediabetes     Proteinuria     Renal disorder     Thyroid nodule     Urate nephropathy        Past Surgical History:   Procedure Laterality Date    BREAST CYST EXCISION      COLONOSCOPY N/A 11/12/2015    COLONOSCOPY N/A 3/12/2021    Procedure:  COLONOSCOPY;  Surgeon: Brendon Lanier MD;  [facility];  Service: Endoscopy;  Laterality: N/A;  covid test 3/9, labs prior, prep instr mailed -ml    INSERTION OF BIVENTRICULAR IMPLANTABLE CARDIOVERTER-DEFIBRILLATOR (ICD)  04/2021    INSERTION OF TUNNELED CENTRAL VENOUS CATHETER (CVC) WITH SUBCUTANEOUS PORT N/A 1/25/2023    Procedure: INSERTION, DUAL LUMEN CATHETER WITH PORT, WITH IMAGING GUIDANCE;  Surgeon: FARIDEH Muñoz III, MD;  Location: [facility] OR [facility] FLR;  Service: Peripheral Vascular;  Laterality: N/A;  possinble permcath placment    NEPHRECTOMY-LAPAROSCOPIC Left 01/12/2016    PERITONEAL CATHETER INSERTION      Permacath insertion  01/12/2017    PLACEMENT OF ARTERIOVENOUS GRAFT  12/28/2022    Procedure: INSERTION, GRAFT, ARTERIOVENOUS;  Surgeon: FARIDEH Muñoz III, MD;  Location: [facility] OR [facility] FLR;  Service: Peripheral Vascular;;    REMOVAL, GRAFT, ARTERIOVENOUS, UPPER EXTREMITY Left 1/25/2023    Procedure: REMOVAL, GRAFT, ARTERIOVENOUS, UPPER EXTREMITY;  Surgeon: FARIDEH Muñoz III, MD;  Location: [facility] OR 2ND FLR;  Service: Peripheral Vascular;  Laterality: Left;    REVISION OF PROCEDURE INVOLVING ARTERIOVENOUS GRAFT Left 12/28/2022    Procedure: REVISION, PROCEDURE INVOLVING ARTERIOVENOUS GRAFT;  Surgeon: FARIDEH Muñoz III, MD;  Location: [facility] OR [facility] FLR;  Service: Peripheral Vascular;  Laterality: Left;    SALPINGOOPHORECTOMY Right 2016    KJB---DAVINCI    TONSILLECTOMY      TUBAL LIGATION           Current Outpatient Medications:     acetaminophen (TYLENOL) 500 MG tablet, Take 500 mg by mouth every 6 (six) hours as needed for Pain., Disp: , Rfl:     apixaban (ELIQUIS) 2.5 mg Tab, Take 1 tablet (2.5 mg total) by mouth 2 (two) times daily., Disp: 60 tablet, Rfl: 11    cabozantinib (CABOMETYX) 60 mg Tab, TAKE ONE TABLET BY MOUTH ONCE DAILY AT THE SAME TIME ON AN EMPTY STOMACH AT LEAST 1 HOUR BEFORE OR 2 HOURS AFTER EATING. AVOID GRAPEFRUIT PRODUCTS, Disp: 30 tablet, Rfl: 4    cloNIDine 0.3 mg/24  hr td ptwk (CATAPRES) 0.3 mg/24 hr, Place 1 patch onto the skin every 7 days., Disp: 4 patch, Rfl: 11    gabapentin (NEURONTIN) 100 MG capsule, Take 1 capsule (100 mg total) by mouth once daily., Disp: 30 capsule, Rfl: 11    HYDROcodone-acetaminophen (NORCO) 5-325 mg per tablet, Take 1 tablet by mouth every 4 (four) hours as needed for Pain. Causes drowsiness. Do not drive or operate machinery with this medication. Do not drink alcohol with this medication. Causes constipation. Take with stool softener., Disp: 10 tablet, Rfl: 0    levothyroxine (SYNTHROID) 75 MCG tablet, Take 1 tablet (75 mcg total) by mouth once daily., Disp: 30 tablet, Rfl: 11    lidocaine-prilocaine (EMLA) cream, APPLY ATLEAST 30 MINUTES BEFORE TREATMENT 3 TIMES A WEEK, Disp: 30 g, Rfl: 11    metoprolol succinate (TOPROL-XL) 100 MG 24 hr tablet, Take 1/2 tablet (50mg) once daily, Disp: 90 tablet, Rfl: 3    mv,Ca,min-folic acid-vit K1 (ONE-A-DAY WOMEN'S 50 PLUS) 400-20 mcg Tab, Take 1 tablet by mouth once daily., Disp: , Rfl:     naloxone (NARCAN) 1 mg/mL injection, 2 mg (1 mg per nostril) by Nasal route as needed for opioid overdose; may repeat in 3 to 5 minutes if not effective. Call 911, Disp: 2 mL, Rfl: 11    oxyCODONE (OXY-IR) 5 mg Cap, Take 1 capsule (5 mg total) by mouth every 6 (six) hours as needed for Pain., Disp: 12 capsule, Rfl: 0    polyethylene glycol (GLYCOLAX) 17 gram/dose powder, Take 17 g by mouth once daily., Disp: 235 g, Rfl: 0    promethazine (PHENERGAN) 25 MG tablet, Take 1 tablet (25 mg total) by mouth every 6 (six) hours as needed for Nausea., Disp: 15 tablet, Rfl: 0    sacubitriL-valsartan (ENTRESTO) 49-51 mg per tablet, Take 1 tablet by mouth 2 (two) times daily., Disp: 60 tablet, Rfl: 11    hydrALAZINE (APRESOLINE) 100 MG tablet, Take 1 tablet (100 mg total) by mouth every 12 (twelve) hours., Disp: 180 tablet, Rfl: 2    Review of patient's allergies indicates:   Allergen Reactions    Coreg [carvedilol] Other (See  "Comments)     Nausea/vomiting    Allopurinol      Other reaction(s): abnormal transaminases       Family History   Problem Relation Age of Onset    Hypertension Mother     Diabetes Father     Kidney disease Father     No Known Problems Son     No Known Problems Son     Diabetes Maternal Grandfather     No Known Problems Sister     No Known Problems Brother     No Known Problems Maternal Grandmother     No Known Problems Paternal Grandmother     No Known Problems Paternal Grandfather     Heart disease Sister     No Known Problems Sister     No Known Problems Brother     No Known Problems Maternal Aunt     No Known Problems Maternal Uncle     No Known Problems Paternal Aunt     No Known Problems Paternal Uncle     Breast cancer Neg Hx     Colon cancer Neg Hx     Cancer Neg Hx     Stroke Neg Hx     Amblyopia Neg Hx     Blindness Neg Hx     Cataracts Neg Hx     Glaucoma Neg Hx     Macular degeneration Neg Hx     Retinal detachment Neg Hx     Strabismus Neg Hx     Thyroid disease Neg Hx        Social History     Tobacco Use    Smoking status: Never    Smokeless tobacco: Never   Substance Use Topics    Alcohol use: No     Comment: . 2 children. works at [employer].    Drug use: No       PHYSICAL EXAM:   BP (!) 150/79 (BP Location: Right arm, Patient Position: Sitting, BP Method: Large (Automatic))   Pulse 80   Temp 98.1 °F (36.7 °C) (Oral)   Ht 5' 4" (1.626 m)   Wt 44 kg (97 lb)   LMP 10/24/2016   BMI 16.65 kg/m²   Constitutional:  Alert,   Well-appearing  In no distress.   Neurological: Normal speech  no focal findings  CN II - XII grossly intact.    Psychiatric: Mood and affect appropriate and symmetric.   HEENT: Normocephalic / atraumatic  PERRLA  Midline trachea  No scars across the neck   Cardiac: Regular rate and rhythm.   Pulmonary: Normal pulmonary effort.   Abdomen: Soft, not distended.     Skin: Warm and well perfused.    Vascular:  Radial pulses are nonpalpable bilaterally. "   Extremities/  Musculoskeletal: No edema.   Left arm demonstrates thrill but now with some significant pulsatility in the laterally tunneled graft.  The excised area is showing improved granulation tissue.  The lateral counter incision now appears completely healed.  See picture   3/31/2023          IMAGING:  Duplex of the graft shows a new very high-grade venous outflow stenosis with a velocity of 681, flow volume 1.5 L down from 1.9      IMPRESSION:    Worsening outflow stenosis, left AV graft putting this risk for thrombosis.  Intervention is warranted   2.  10 weeks status post excision, infected excluded AV graft.  Persistent fibrinous area in the upper part of the excised area.  Counter incision now completely healed    PLAN  Left fistulogram and intervention 04/19/2023, cath lab 3, noon  Cath lab case  3.   Continue Medihoney wound care    I have explained the risks, benefits and alternatives for this procedure in detail.  The patient voices understanding and all questions have be answered, and agrees to proceed with the procedure.     MANPREET Muñoz III, MD, FACS  Professor and Chief, Vascular and Endovascular Surgery

## 2023-04-14 NOTE — H&P (VIEW-ONLY)
VASCULAR SURGERY SERVICE    CHIEF COMPLAINT:  Functional left AV graft dysfunctional left AV graft    HISTORY OF PRESENT ILLNESS: Eva Bloom is a 57 y.o. female end-stage renal disease, with multiple serious medical comorbidities including admission for sepsis 2 months ago, EF 20%, AFib on Eliquis, who is having increasing bleeding after her dialysis runs.  I placed a revision, left AV graft with long interposition Accu Seal 12/28/2022.  She has had uninterrupted use of this graft ever since.    Recent weeks she is had increased bleeding after dialysis sticks    S/p:  Excision, excluded L AVG and permacath 1/25/2023  revision, left AV graft with long interposition Accu Seal 12/28/2022  Original left AV graft 2017 with subsequent covered stent placement in venous outflow    03/10/2023:  She continues use the wound VAC on the medial excised site, with Medihoney to the small down the lateral counter incision of the new graft    3/31/2023:  2 week follow-up.  She is without complaint    04/14/2023:  She is without complaint.  Continues Medihoney on the wound    Past Medical History:   Diagnosis Date    Anemia     Bronchitis 03/01/2017    Cancer 2016    kidney cancer    CHF (congestive heart failure), NYHA class II, chronic, systolic     CMV (cytomegalovirus) antibody positive     Essential hypertension 9/23/2015    H/O herpes simplex type 2 infection     Herpes simplex type 1 antibody positive     History of kidney cancer     s/p left nephrectomy 1/2016    Hyperparathyroidism, unspecified     Hyperuricemia without signs of inflammatory arthritis and tophaceous disease     Kidney stones     LGSIL (low grade squamous intraepithelial dysplasia)     Myocardiopathy 7/21/2017    Prediabetes     Proteinuria     Renal disorder     Thyroid nodule     Urate nephropathy        Past Surgical History:   Procedure Laterality Date    BREAST CYST EXCISION      COLONOSCOPY N/A 11/12/2015    COLONOSCOPY N/A 3/12/2021    Procedure:  COLONOSCOPY;  Surgeon: Brendon Lanier MD;  Location: [facility] ENDO;  Service: Endoscopy;  Laterality: N/A;  covid test 3/9, labs prior, prep instr mailed -ml    INSERTION OF BIVENTRICULAR IMPLANTABLE CARDIOVERTER-DEFIBRILLATOR (ICD)  04/2021    INSERTION OF TUNNELED CENTRAL VENOUS CATHETER (CVC) WITH SUBCUTANEOUS PORT N/A 1/25/2023    Procedure: INSERTION, DUAL LUMEN CATHETER WITH PORT, WITH IMAGING GUIDANCE;  Surgeon: FARIDEH Muñoz III, MD;  Location: [facility] OR [facility] FLR;  Service: Peripheral Vascular;  Laterality: N/A;  possinble permcath placment    NEPHRECTOMY-LAPAROSCOPIC Left 01/12/2016    PERITONEAL CATHETER INSERTION      Permacath insertion  01/12/2017    PLACEMENT OF ARTERIOVENOUS GRAFT  12/28/2022    Procedure: INSERTION, GRAFT, ARTERIOVENOUS;  Surgeon: FARIDEH Muñoz III, MD;  Location: [facility] OR [facility] FLR;  Service: Peripheral Vascular;;    REMOVAL, GRAFT, ARTERIOVENOUS, UPPER EXTREMITY Left 1/25/2023    Procedure: REMOVAL, GRAFT, ARTERIOVENOUS, UPPER EXTREMITY;  Surgeon: FARIDEH Muñoz III, MD;  Location: [facility] OR 2ND FLR;  Service: Peripheral Vascular;  Laterality: Left;    REVISION OF PROCEDURE INVOLVING ARTERIOVENOUS GRAFT Left 12/28/2022    Procedure: REVISION, PROCEDURE INVOLVING ARTERIOVENOUS GRAFT;  Surgeon: FARIDEH Muñoz III, MD;  Location: [facility] OR [facility] FLR;  Service: Peripheral Vascular;  Laterality: Left;    SALPINGOOPHORECTOMY Right 2016    KJB---DAVINCI    TONSILLECTOMY      TUBAL LIGATION           Current Outpatient Medications:     acetaminophen (TYLENOL) 500 MG tablet, Take 500 mg by mouth every 6 (six) hours as needed for Pain., Disp: , Rfl:     apixaban (ELIQUIS) 2.5 mg Tab, Take 1 tablet (2.5 mg total) by mouth 2 (two) times daily., Disp: 60 tablet, Rfl: 11    cabozantinib (CABOMETYX) 60 mg Tab, TAKE ONE TABLET BY MOUTH ONCE DAILY AT THE SAME TIME ON AN EMPTY STOMACH AT LEAST 1 HOUR BEFORE OR 2 HOURS AFTER EATING. AVOID GRAPEFRUIT PRODUCTS, Disp: 30 tablet, Rfl: 4    cloNIDine 0.3 mg/24  hr td ptwk (CATAPRES) 0.3 mg/24 hr, Place 1 patch onto the skin every 7 days., Disp: 4 patch, Rfl: 11    gabapentin (NEURONTIN) 100 MG capsule, Take 1 capsule (100 mg total) by mouth once daily., Disp: 30 capsule, Rfl: 11    HYDROcodone-acetaminophen (NORCO) 5-325 mg per tablet, Take 1 tablet by mouth every 4 (four) hours as needed for Pain. Causes drowsiness. Do not drive or operate machinery with this medication. Do not drink alcohol with this medication. Causes constipation. Take with stool softener., Disp: 10 tablet, Rfl: 0    levothyroxine (SYNTHROID) 75 MCG tablet, Take 1 tablet (75 mcg total) by mouth once daily., Disp: 30 tablet, Rfl: 11    lidocaine-prilocaine (EMLA) cream, APPLY ATLEAST 30 MINUTES BEFORE TREATMENT 3 TIMES A WEEK, Disp: 30 g, Rfl: 11    metoprolol succinate (TOPROL-XL) 100 MG 24 hr tablet, Take 1/2 tablet (50mg) once daily, Disp: 90 tablet, Rfl: 3    mv,Ca,min-folic acid-vit K1 (ONE-A-DAY WOMEN'S 50 PLUS) 400-20 mcg Tab, Take 1 tablet by mouth once daily., Disp: , Rfl:     naloxone (NARCAN) 1 mg/mL injection, 2 mg (1 mg per nostril) by Nasal route as needed for opioid overdose; may repeat in 3 to 5 minutes if not effective. Call 911, Disp: 2 mL, Rfl: 11    oxyCODONE (OXY-IR) 5 mg Cap, Take 1 capsule (5 mg total) by mouth every 6 (six) hours as needed for Pain., Disp: 12 capsule, Rfl: 0    polyethylene glycol (GLYCOLAX) 17 gram/dose powder, Take 17 g by mouth once daily., Disp: 235 g, Rfl: 0    promethazine (PHENERGAN) 25 MG tablet, Take 1 tablet (25 mg total) by mouth every 6 (six) hours as needed for Nausea., Disp: 15 tablet, Rfl: 0    sacubitriL-valsartan (ENTRESTO) 49-51 mg per tablet, Take 1 tablet by mouth 2 (two) times daily., Disp: 60 tablet, Rfl: 11    hydrALAZINE (APRESOLINE) 100 MG tablet, Take 1 tablet (100 mg total) by mouth every 12 (twelve) hours., Disp: 180 tablet, Rfl: 2    Review of patient's allergies indicates:   Allergen Reactions    Coreg [carvedilol] Other (See  "Comments)     Nausea/vomiting    Allopurinol      Other reaction(s): abnormal transaminases       Family History   Problem Relation Age of Onset    Hypertension Mother     Diabetes Father     Kidney disease Father     No Known Problems Son     No Known Problems Son     Diabetes Maternal Grandfather     No Known Problems Sister     No Known Problems Brother     No Known Problems Maternal Grandmother     No Known Problems Paternal Grandmother     No Known Problems Paternal Grandfather     Heart disease Sister     No Known Problems Sister     No Known Problems Brother     No Known Problems Maternal Aunt     No Known Problems Maternal Uncle     No Known Problems Paternal Aunt     No Known Problems Paternal Uncle     Breast cancer Neg Hx     Colon cancer Neg Hx     Cancer Neg Hx     Stroke Neg Hx     Amblyopia Neg Hx     Blindness Neg Hx     Cataracts Neg Hx     Glaucoma Neg Hx     Macular degeneration Neg Hx     Retinal detachment Neg Hx     Strabismus Neg Hx     Thyroid disease Neg Hx        Social History     Tobacco Use    Smoking status: Never    Smokeless tobacco: Never   Substance Use Topics    Alcohol use: No     Comment: . 2 children. works at [employer].    Drug use: No       PHYSICAL EXAM:   BP (!) 150/79 (BP Location: Right arm, Patient Position: Sitting, BP Method: Large (Automatic))   Pulse 80   Temp 98.1 °F (36.7 °C) (Oral)   Ht 5' 4" (1.626 m)   Wt 44 kg (97 lb)   LMP 10/24/2016   BMI 16.65 kg/m²   Constitutional:  Alert,   Well-appearing  In no distress.   Neurological: Normal speech  no focal findings  CN II - XII grossly intact.    Psychiatric: Mood and affect appropriate and symmetric.   HEENT: Normocephalic / atraumatic  PERRLA  Midline trachea  No scars across the neck   Cardiac: Regular rate and rhythm.   Pulmonary: Normal pulmonary effort.   Abdomen: Soft, not distended.     Skin: Warm and well perfused.    Vascular:  Radial pulses are nonpalpable bilaterally. "   Extremities/  Musculoskeletal: No edema.   Left arm demonstrates thrill but now with some significant pulsatility in the laterally tunneled graft.  The excised area is showing improved granulation tissue.  The lateral counter incision now appears completely healed.  See picture   3/31/2023          IMAGING:  Duplex of the graft shows a new very high-grade venous outflow stenosis with a velocity of 681, flow volume 1.5 L down from 1.9      IMPRESSION:    Worsening outflow stenosis, left AV graft putting this risk for thrombosis.  Intervention is warranted   2.  10 weeks status post excision, infected excluded AV graft.  Persistent fibrinous area in the upper part of the excised area.  Counter incision now completely healed    PLAN  Left fistulogram and intervention 04/19/2023, cath lab 3, noon  Cath lab case  3.   Continue Medihoney wound care    I have explained the risks, benefits and alternatives for this procedure in detail.  The patient voices understanding and all questions have be answered, and agrees to proceed with the procedure.     MANPREET Muñoz III, MD, FACS  Professor and Chief, Vascular and Endovascular Surgery

## 2023-04-14 NOTE — TELEPHONE ENCOUNTER
Attempted to contact HH nurse in response to message. Voice message left with Dr. Muñoz's response.----- Message from FARIDEH Muñoz III, MD sent at 4/14/2023  3:22 PM CDT -----  Regarding: RE: suzy  Contact: @[phone]  yes  ----- Message -----  From: Adriana De La Rosa RN  Sent: 4/14/2023   2:28 PM CDT  To: FARIDEH Muñoz III, MD  Subject: FW: adv                                           is asking if visits can be decreased form 3x/wk to 2x/wk as she no longer has the wound vac and her  is doing wound care in between HH visits.  Adriana  ----- Message -----  From: John Perez  Sent: 4/14/2023   1:03 PM CDT  To: Toni ARREGUIN III Staff  Subject: adv                                              Home health nurse calling to see if they can change pts visits from 3 times a week too 2 times a week pls call and [phone]

## 2023-04-14 NOTE — TELEPHONE ENCOUNTER
Contacted HH nurse in response to message. States she would like to know if HH visits can be decreased from 3x/week to 2x/week as pt no longer has wound vac and because pt's  has been performing wound care on days when HH does not visit. Message sent to Dr. Muñoz to discuss and will contact nurse with response.----- Message from John Perez sent at 4/14/2023  1:02 PM CDT -----  Regarding: adv  Contact: @[phone]  Home health nurse calling to see if they can change pts visits from 3 times a week too 2 times a week pls call and [phone]

## 2023-04-16 ENCOUNTER — DOCUMENT SCAN (OUTPATIENT)
Dept: HOME HEALTH SERVICES | Facility: HOSPITAL | Age: 58
End: 2023-04-16
Payer: COMMERCIAL

## 2023-04-19 ENCOUNTER — HOSPITAL ENCOUNTER (OUTPATIENT)
Facility: HOSPITAL | Age: 58
Discharge: HOME | End: 2023-04-19
Attending: SURGERY | Admitting: SURGERY
Payer: MEDICARE

## 2023-04-19 VITALS
HEIGHT: 64 IN | DIASTOLIC BLOOD PRESSURE: 73 MMHG | OXYGEN SATURATION: 97 % | BODY MASS INDEX: 16.56 KG/M2 | RESPIRATION RATE: 18 BRPM | SYSTOLIC BLOOD PRESSURE: 124 MMHG | TEMPERATURE: 98 F | HEART RATE: 79 BPM | WEIGHT: 97 LBS

## 2023-04-19 DIAGNOSIS — T82.858D STENOSIS OF ARTERIOVENOUS DIALYSIS FISTULA, SUBSEQUENT ENCOUNTER: Primary | ICD-10-CM

## 2023-04-19 DIAGNOSIS — T82.858D ARTERIOVENOUS FISTULA STENOSIS, SUBSEQUENT ENCOUNTER: ICD-10-CM

## 2023-04-19 PROCEDURE — C1725 CATH, TRANSLUMIN NON-LASER: HCPCS | Performed by: SURGERY

## 2023-04-19 PROCEDURE — 63600175 PHARM REV CODE 636 W HCPCS: Performed by: SURGERY

## 2023-04-19 PROCEDURE — 99152 MOD SED SAME PHYS/QHP 5/>YRS: CPT | Mod: ,,, | Performed by: SURGERY

## 2023-04-19 PROCEDURE — 36902 PR INTRO CATH, DIALYSIS CIRCUIT W/TRANSLML BALLOON ANGIO: ICD-10-PCS | Mod: 78,,, | Performed by: SURGERY

## 2023-04-19 PROCEDURE — 27201423 OPTIME MED/SURG SUP & DEVICES STERILE SUPPLY: Performed by: SURGERY

## 2023-04-19 PROCEDURE — C1894 INTRO/SHEATH, NON-LASER: HCPCS | Performed by: SURGERY

## 2023-04-19 PROCEDURE — C1769 GUIDE WIRE: HCPCS | Performed by: SURGERY

## 2023-04-19 PROCEDURE — 36902 INTRO CATH DIALYSIS CIRCUIT: CPT | Performed by: SURGERY

## 2023-04-19 PROCEDURE — 99152 MOD SED SAME PHYS/QHP 5/>YRS: CPT | Performed by: SURGERY

## 2023-04-19 PROCEDURE — 25000003 PHARM REV CODE 250: Performed by: SURGERY

## 2023-04-19 PROCEDURE — 25500020 PHARM REV CODE 255: Performed by: SURGERY

## 2023-04-19 PROCEDURE — 99152 PR MOD CONSCIOUS SEDATION, SAME PHYS, 5+ YRS, FIRST 15 MIN: ICD-10-PCS | Mod: ,,, | Performed by: SURGERY

## 2023-04-19 PROCEDURE — 36902 INTRO CATH DIALYSIS CIRCUIT: CPT | Mod: 78,,, | Performed by: SURGERY

## 2023-04-19 RX ORDER — MIDAZOLAM HYDROCHLORIDE 1 MG/ML
INJECTION, SOLUTION INTRAMUSCULAR; INTRAVENOUS
Status: DISCONTINUED | OUTPATIENT
Start: 2023-04-19 | End: 2023-04-19 | Stop reason: HOSPADM

## 2023-04-19 RX ORDER — LIDOCAINE HYDROCHLORIDE 20 MG/ML
INJECTION, SOLUTION INFILTRATION; PERINEURAL
Status: DISCONTINUED | OUTPATIENT
Start: 2023-04-19 | End: 2023-04-19 | Stop reason: HOSPADM

## 2023-04-19 RX ORDER — HEPARIN SOD,PORCINE/0.9 % NACL 1000/500ML
INTRAVENOUS SOLUTION INTRAVENOUS
Status: DISCONTINUED | OUTPATIENT
Start: 2023-04-19 | End: 2023-04-19 | Stop reason: HOSPADM

## 2023-04-19 RX ORDER — FENTANYL CITRATE 50 UG/ML
INJECTION, SOLUTION INTRAMUSCULAR; INTRAVENOUS
Status: DISCONTINUED | OUTPATIENT
Start: 2023-04-19 | End: 2023-04-19 | Stop reason: HOSPADM

## 2023-04-19 RX ORDER — SODIUM CHLORIDE 9 MG/ML
INJECTION, SOLUTION INTRAVENOUS CONTINUOUS
Status: DISCONTINUED | OUTPATIENT
Start: 2023-04-19 | End: 2023-04-19 | Stop reason: HOSPADM

## 2023-04-19 RX ORDER — CEFAZOLIN SODIUM 1 G/3ML
INJECTION, POWDER, FOR SOLUTION INTRAMUSCULAR; INTRAVENOUS
Status: DISCONTINUED | OUTPATIENT
Start: 2023-04-19 | End: 2023-04-19 | Stop reason: HOSPADM

## 2023-04-19 NOTE — BRIEF OP NOTE
[facility] - Cath Lab  Brief Operative Note    Surgery Date: 4/19/2023     Surgeon(s) and Role:     * FARIDEH Muñoz III, MD - Primary    Assisting Surgeon: Flores Wise MD    Pre-op Diagnosis:  Arteriovenous fistula stenosis, subsequent encounter [T82.858D]    Post-op Diagnosis:  Post-Op Diagnosis Codes:     * Arteriovenous fistula stenosis, subsequent encounter [T82.858D]    PROCEDURES:    PTA, L AVG (8x40 [place])  Conscious Sedation    Anesthesia: RN IV Sedation    Operative Findings: Long existing viabaun, 50% stenosis immediate past distal end;  improvement with PTA    Estimated Blood Loss: <4cc         Specimens:   Specimen (24h ago, onward)      None              Discharge Note    OUTCOME: successful outpatient procedure     DISPOSITION: home    FINAL DIAGNOSIS:  stenosis AVG    FOLLOWUP: 7-10 days with AVG duplex    DISCHARGE INSTRUCTIONS:  see below

## 2023-04-19 NOTE — Clinical Note
24 ml of contrast were injected throughout the case. 26 mL of contrast was the total wasted during the case. 50 mL was the total amount used during the case.

## 2023-04-19 NOTE — Clinical Note
The site was marked. The site was prepped with chlorhexidine. The patient was draped. The patient was positioned supine. The patient was secured to an armboard. Left upper arm AV graft

## 2023-04-19 NOTE — NURSING
Report received from FELIPE Newberry. Patient s/p LUE fistulagram. Dressing cdi. + T/+B. Vss. No complaints from patient. Call light in reach. Family at bedside.

## 2023-04-19 NOTE — NURSING
Patient discharged per MD orders. Instructions given on medications, wound care, activity, signs of infection, when to call MD, and follow up appointments. Pt verbalized understanding. PIV removed. Patient and family leaving unit with personal wheelchair.

## 2023-04-19 NOTE — OP NOTE
[facility] - Cath Lab  Brief Operative Note     Surgery Date: 4/19/2023      Surgeon(s) and Role:     * FARIDEH Muñoz III, MD - Primary     Assisting Surgeon: Flores Wise MD     Pre-op Diagnosis:  Arteriovenous fistula stenosis, subsequent encounter [T82.858D]     Post-op Diagnosis:  Post-Op Diagnosis Codes:     * Arteriovenous fistula stenosis, subsequent encounter [T82.858D]     PROCEDURES:     PTA, L AVG (8x40 [place])  Conscious Sedation     Anesthesia: RN IV Sedation     Operative Findings: Long existing viabaun, 50% stenosis immediate past distal end;  improvement with PTA     Estimated Blood Loss: <4cc    Indications:  57-year-old female with very pulsatile AV graft and significant outflow stenosis by duplex.    Procedure patient brought cath lab placed in supine position.  After sterile prep drape infiltration of 1% lidocaine the left upper arm prosthetic AV graft was accessed with a micropuncture set and fistulogram was performed.  This revealed widely patent graft with a long via [place] in the outflow tract with no stenosis.  Immediately distal this there was proximally 50% stenosis before large almost aneurysmal axillary/subclavian vein.  There were significant collateralization noted there.  There was no central venous stenosis that was appreciated although there is an indwelling 14.5 [place] PermCath in the outflow vessels.      Stiff angled Glidewire was placed in a short 6 sheath placed.  The area in question was dilated with a 8 x 40 [place] balloon.  There was some modest wasting of the fully effaced 12 atmospheres this was held for 2 minutes.  Completion fistulogram revealed less than 20% residual stenosis.      All catheters and guidewires removed hemostasis was achieved with Monocryl suture.  Sterile dressing was applied the patient was then recovery area in stable condition.    MODERATE SEDATION IN CATH LAB   I was present for moderate sedation, and monitored the patients cardio respiratory  functions during the moderate sedation   See nurses notes for Intra-service start and end times and for the log of the name of the RN who administered the medicines for the moderate sedation, including their doseage and route.     Time of sedation:  20 minutes     MANPREET Muñoz III, MD, FACS  Professor and Chief, Vascular and Endovascular Surgery

## 2023-04-20 ENCOUNTER — PATIENT MESSAGE (OUTPATIENT)
Dept: INTERNAL MEDICINE | Facility: CLINIC | Age: 58
End: 2023-04-20
Payer: COMMERCIAL

## 2023-04-25 NOTE — PROGRESS NOTES
Ms. Bloom is a patient of Dr. Chisholm and was last seen in clinic 12/19/2022.      Subjective:   Patient ID:  Eva Bloom is a 57 y.o. female who presents for follow up of ICD  .     HPI:    Ms. Bloom is a 57 y.o. female with NICM, HTN, PH, renal cell CA with ESRD on HD, S-ICD (4/2021), pAF here for follow up.    Background:    NICM  HTN on meds  PH  renal cell CA, with subsequent ESRD after nephrectomy. Gets HD via LUE T/R/Sat  [place] with stairs. No CP, no syncope.  echo 2/21 30%. mod PC effusion (chronic), 2+ MR  PET 2017 neg  NICM  Chronic PC effusion, likely related to ESRD  Will pursue S-ICD, which is a better option for her given young age, ESRD, and lack of pacing indication.  Will likely not perform DFT test. Discussed with pt, who understands and agrees.    4/19/2021: Successful implantation of ICD Sub Q placed for primary intervention.  7/20/2021: She is 3 mo s/p S-ICD implantation. Stable from a device perspective with stable lead and device function. No arrhythmia noted. Home monitor connected per pt.    7/25/2022: Device function stable. She is 1 mo s/p new onset symptomatic AF which converted back to SR after metoprolol. She was also found to be anemic after epistaxis which has resolved s/p cauterization. Did received 1 PRBC in hospital. She was started on eliquis but was unable to tolerate it due to side effects (severe diarrhea which resolved after cessation of medication). I discussed atrial fibrillation and its basic pathophysiology, including the health implications and treatment options with Eva Bloom. Specifically, I addressed the need for CVA prophylaxis as well as the goal to reduce symptomatic arrhythmic episodes by pharmacologic and/or procedural methods. We discussed options for anticoagulation, including coumadin vs dabigatran/rivaroxaban/apixaban. Discussed risks and benefits of each, including dosing, side effects, risk, and cost. Answered all questions. Patient verbalized  understanding.  CHADSVASc 5 and OAC is recommended. Will start warfarin given ESRD. Pt also given information to troubleshoot home monitoring. For now will have patient RTC in 3 mo to determine whether any rhythm control efforts are required.      9/14/2022: Patient reported stopping warfarin because it made her feel poorly. She was restarted on eliquis 2.5mg BID.    10/6/2022: Admission to observation for symptomatic anemia. Suspect anemia from blood lost from dialysis access few weeks ago. Outpatient HD unit was planning to increase LUMA. Received 2 units of PRBC overnight hgb  5.7>8.3.    10/29/2022: 57 year old female with ESRD presents to the ED to evaluate her worsening bilateral leg pain and weakness for 5 days.  Patient was hypothermic in ER with lactic acidosis.  Started on Bicarb drip and underwent HD.  Started on broad spectrum ABx's.  Elevated Troponin and Cardiology consulted.  Echo shows EF of 25%.  Denies any chest pain and no plans for intervention at this time.  US abdomen shows possible acute cholecystitis.  Surgery consulted.  HIDA unable to eval gallbladder due to poor hepatic clearance. No indication for cholecystostomy drain as pain has improved per Surgery.  PT/OT consulted.  Recommending Rehab.  SW consulted.  All cultures negative with no obvious source of infection and ABx's stopped.  Patient and  do not want Rehab and want to go home with HH.  She has remained afebrile and hemodynamically stable.  Elevated LFT's on admit have gradually improved.  Patient will be discharged home with HH and DME.  She will follow up with PCP.    12/19/2022: Patient was discharged this morning after going to the ED yesterday for hypotension. Was given fluid for dehydration thought secondary to diarrhea. Will refer to GI for diarrhea management. Will also temporarily reduce metoprolol. AV graft revision scheduled for 12/28/2022. On eliquis 2.5mg BID for CVA prophylaxis. ESRD on HD. Will follow up after  "procedure to assess for improvement of bleeding. Low AF burden with most recent AF episode 7/2022.      Update (04/26/2023):    1/4/2023 Underwent cauterization to stop epistaxis    2/8/2023: Saw endocrinology for hyperparathyroidism. Saw surgery as well: "At this time she is a poor surgical candidate with poor functional status and has not met criteria for parathyroidectomy in secondary hyperparathyroidism."    2/16/2023: Graft infection - treated with abx    2/16/2023: Saw hem onc for management of metastatic RCC    4/19/2023: Left fistulogram and intervention     Today she says she is feeling much better recently. No bleeding since last fistula intervention last week.     Remains on eliquis 2.5mg BID (renal function and wt). ESRD on HD.    Device Interrogation (4/26/2023) reveals an intrinsic SR with stable lead and device function. No arrhythmias or treated episodes were noted.  Estimated battery longevity 78%.     I have personally reviewed the patient's EKG today, which shows sinus rhythm at 78bpm. MA interval is 162. QRS is 96. QTc is 501.    Relevant Cardiac Test Results:    2D Echo (10/30/2022):  The left ventricle is mildly enlarged with moderate concentric hypertrophy and severely decreased systolic function.  The estimated ejection fraction is 25%.  Grade II left ventricular diastolic dysfunction.  Normal right ventricular size with normal right ventricular systolic function.  Moderate left atrial enlargement.  Moderate right atrial enlargement.  There is mild aortic valve stenosis.  Aortic valve area is 2.11 cm2; peak velocity is 2.11 m/s; mean gradient is 10 mmHg.  Moderate mitral regurgitation.  Moderate tricuspid regurgitation.  Elevated central venous pressure (15 mmHg).  The estimated PA systolic pressure is 60 mmHg.  There is pulmonary hypertension.  Small to moderate circumferential pericardial effusion. No tamponade    Current Outpatient Medications   Medication Sig    acetaminophen (TYLENOL) " 500 MG tablet Take 500 mg by mouth every 6 (six) hours as needed for Pain.    apixaban (ELIQUIS) 2.5 mg Tab Take 1 tablet (2.5 mg total) by mouth 2 (two) times daily.    cabozantinib (CABOMETYX) 60 mg Tab TAKE ONE TABLET BY MOUTH ONCE DAILY AT THE SAME TIME ON AN EMPTY STOMACH AT LEAST 1 HOUR BEFORE OR 2 HOURS AFTER EATING. AVOID GRAPEFRUIT PRODUCTS    cloNIDine 0.3 mg/24 hr td ptwk (CATAPRES) 0.3 mg/24 hr Place 1 patch onto the skin every 7 days.    gabapentin (NEURONTIN) 100 MG capsule Take 1 capsule (100 mg total) by mouth once daily.    hydrALAZINE (APRESOLINE) 100 MG tablet Take 1 tablet (100 mg total) by mouth every 12 (twelve) hours.    HYDROcodone-acetaminophen (NORCO) 5-325 mg per tablet Take 1 tablet by mouth every 4 (four) hours as needed for Pain. Causes drowsiness. Do not drive or operate machinery with this medication. Do not drink alcohol with this medication. Causes constipation. Take with stool softener.    levothyroxine (SYNTHROID) 75 MCG tablet Take 1 tablet (75 mcg total) by mouth once daily.    lidocaine-prilocaine (EMLA) cream APPLY ATLEAST 30 MINUTES BEFORE TREATMENT 3 TIMES A WEEK    metoprolol succinate (TOPROL-XL) 100 MG 24 hr tablet Take 1/2 tablet (50mg) once daily    mv,Ca,min-folic acid-vit K1 (ONE-A-DAY WOMEN'S 50 PLUS) 400-20 mcg Tab Take 1 tablet by mouth once daily.    naloxone (NARCAN) 1 mg/mL injection 2 mg (1 mg per nostril) by Nasal route as needed for opioid overdose; may repeat in 3 to 5 minutes if not effective. Call 911    oxyCODONE (OXY-IR) 5 mg Cap Take 1 capsule (5 mg total) by mouth every 6 (six) hours as needed for Pain.    polyethylene glycol (GLYCOLAX) 17 gram/dose powder Take 17 g by mouth once daily.    promethazine (PHENERGAN) 25 MG tablet Take 1 tablet (25 mg total) by mouth every 6 (six) hours as needed for Nausea.    sacubitriL-valsartan (ENTRESTO) 49-51 mg per tablet Take 1 tablet by mouth 2 (two) times daily.     No current facility-administered medications  "for this visit.       Review of Systems   Constitutional: Negative for malaise/fatigue.   Cardiovascular:  Negative for chest pain, dyspnea on exertion, irregular heartbeat, leg swelling and palpitations.   Respiratory:  Negative for shortness of breath.    Hematologic/Lymphatic: Positive for bleeding problem.   Skin:  Negative for rash.   Musculoskeletal:  Negative for myalgias.   Gastrointestinal:  Negative for hematemesis, hematochezia and nausea.   Genitourinary:  Negative for hematuria.   Neurological:  Negative for light-headedness.   Psychiatric/Behavioral:  Negative for altered mental status.    Allergic/Immunologic: Negative for persistent infections.     Objective:          /65   Pulse 72   Ht 5' 4" (1.626 m)   Wt 44.9 kg (98 lb 15.8 oz)   LMP 10/24/2016   BMI 16.99 kg/m²     Physical Exam  Vitals and nursing note reviewed.   Constitutional:       Appearance: Normal appearance. She is well-developed.   HENT:      Head: Normocephalic.      Nose: Nose normal.   Eyes:      Pupils: Pupils are equal, round, and reactive to light.   Cardiovascular:      Rate and Rhythm: Normal rate and regular rhythm.   Pulmonary:      Effort: No respiratory distress.      Breath sounds: Normal breath sounds.   Abdominal:      Comments: ICD to flank   Musculoskeletal:         General: Normal range of motion.   Skin:     General: Skin is warm and dry.      Findings: No erythema.      Comments: Left fistula   Neurological:      Mental Status: She is alert and oriented to person, place, and time.   Psychiatric:         Speech: Speech normal.         Behavior: Behavior normal.         Lab Results   Component Value Date     03/27/2023    K 3.6 03/27/2023    MG 2.1 01/30/2023    BUN 46 (H) 03/27/2023    CREATININE 5.7 (H) 03/27/2023    ALT 11 03/27/2023    AST 31 03/27/2023    HGB 10.9 (L) 03/27/2023    HCT 37.2 03/27/2023    TSH 19.710 (H) 03/27/2023    LDLCALC 46.6 (L) 10/22/2022       Recent Labs   Lab " 08/29/22  0809 09/24/22  1151 11/04/22  0532 11/16/22  2136   INR 1.3 H 1.1 1.3 H 1.1       Assessment:     1. ICD (implantable cardioverter-defibrillator) in place - SubQ    2. Cardiomyopathy, nonischemic    3. Essential hypertension    4. PAF (paroxysmal atrial fibrillation)    5. ESRD (end stage renal disease)    6. Chronic systolic congestive heart failure        Plan:     In summary, Ms. Bloom is a 57 y.o. female with NICM, HTN, PH, renal cell CA with ESRD on HD, S-ICD (4/2021), pAF here for follow up.  Stable from a device standpoint. No treated episodes. No recent AF. On low dose eliquis. Chronic bleeding from fistula and underwent intervention last week - she says bleeding has resolved.  Remote monitor not connected. Return to device clinic in July. If remote monitor can be connected at that time, RTC 1 yr to EP clinic. She is overdue to see general cardiology.    General cardiology appt.  Continue current medication regimen and device settings.   Follow up in device clinic as scheduled in 3 mo.   Follow up in EP clinic in 1 year, sooner as needed.     *A copy of this note has been sent to Dr. Chisholm*    Follow up in about 1 year (around 4/26/2024).    ------------------------------------------------------------------    ROBERT Aguilar, NP-C  Cardiac Electrophysiology

## 2023-04-26 ENCOUNTER — OFFICE VISIT (OUTPATIENT)
Dept: ELECTROPHYSIOLOGY | Facility: CLINIC | Age: 58
End: 2023-04-26
Payer: COMMERCIAL

## 2023-04-26 ENCOUNTER — CLINICAL SUPPORT (OUTPATIENT)
Dept: CARDIOLOGY | Facility: HOSPITAL | Age: 58
End: 2023-04-26
Attending: INTERNAL MEDICINE
Payer: MEDICARE

## 2023-04-26 ENCOUNTER — HOSPITAL ENCOUNTER (OUTPATIENT)
Dept: CARDIOLOGY | Facility: CLINIC | Age: 58
Discharge: HOME | End: 2023-04-26
Payer: MEDICARE

## 2023-04-26 VITALS
SYSTOLIC BLOOD PRESSURE: 100 MMHG | HEART RATE: 72 BPM | WEIGHT: 99 LBS | DIASTOLIC BLOOD PRESSURE: 65 MMHG | HEIGHT: 64 IN | BODY MASS INDEX: 16.9 KG/M2

## 2023-04-26 DIAGNOSIS — I49.8 OTHER CARDIAC ARRHYTHMIA: ICD-10-CM

## 2023-04-26 DIAGNOSIS — I50.22 CHRONIC SYSTOLIC CONGESTIVE HEART FAILURE: ICD-10-CM

## 2023-04-26 DIAGNOSIS — N18.6 ESRD (END STAGE RENAL DISEASE): ICD-10-CM

## 2023-04-26 DIAGNOSIS — Z95.810 ICD (IMPLANTABLE CARDIOVERTER-DEFIBRILLATOR) IN PLACE: Primary | ICD-10-CM

## 2023-04-26 DIAGNOSIS — I10 ESSENTIAL HYPERTENSION: ICD-10-CM

## 2023-04-26 DIAGNOSIS — I48.0 PAF (PAROXYSMAL ATRIAL FIBRILLATION): ICD-10-CM

## 2023-04-26 DIAGNOSIS — I42.8 CARDIOMYOPATHY, NONISCHEMIC: ICD-10-CM

## 2023-04-26 PROCEDURE — 93010 RHYTHM STRIP: ICD-10-PCS | Mod: S$PBB,,, | Performed by: INTERNAL MEDICINE

## 2023-04-26 PROCEDURE — 93260 PRGRMG DEV EVAL IMPLTBL SYS: CPT | Mod: 26,,, | Performed by: INTERNAL MEDICINE

## 2023-04-26 PROCEDURE — 99999 PR PBB SHADOW E&M-EST. PATIENT-LVL V: CPT | Mod: PBBFAC,,, | Performed by: NURSE PRACTITIONER

## 2023-04-26 PROCEDURE — 93260 CARDIAC DEVICE CHECK - IN CLINIC & HOSPITAL: ICD-10-PCS | Mod: 26,,, | Performed by: INTERNAL MEDICINE

## 2023-04-26 PROCEDURE — 99215 OFFICE O/P EST HI 40 MIN: CPT | Mod: PBBFAC | Performed by: NURSE PRACTITIONER

## 2023-04-26 PROCEDURE — 93010 ELECTROCARDIOGRAM REPORT: CPT | Mod: S$PBB,,, | Performed by: INTERNAL MEDICINE

## 2023-04-26 PROCEDURE — 93282 PRGRMG EVAL IMPLANTABLE DFB: CPT

## 2023-04-26 PROCEDURE — 93005 ELECTROCARDIOGRAM TRACING: CPT | Mod: PBBFAC,59 | Performed by: INTERNAL MEDICINE

## 2023-04-26 PROCEDURE — 99999 PR PBB SHADOW E&M-EST. PATIENT-LVL V: ICD-10-PCS | Mod: PBBFAC,,, | Performed by: NURSE PRACTITIONER

## 2023-04-28 ENCOUNTER — HOSPITAL ENCOUNTER (OUTPATIENT)
Dept: VASCULAR SURGERY | Facility: CLINIC | Age: 58
Discharge: HOME | End: 2023-04-28
Attending: SURGERY
Payer: MEDICARE

## 2023-04-28 ENCOUNTER — OFFICE VISIT (OUTPATIENT)
Dept: VASCULAR SURGERY | Facility: CLINIC | Age: 58
End: 2023-04-28
Payer: MEDICARE

## 2023-04-28 VITALS
HEIGHT: 64 IN | WEIGHT: 101.44 LBS | BODY MASS INDEX: 17.32 KG/M2 | SYSTOLIC BLOOD PRESSURE: 123 MMHG | DIASTOLIC BLOOD PRESSURE: 71 MMHG | HEART RATE: 82 BPM | TEMPERATURE: 98 F

## 2023-04-28 DIAGNOSIS — Z99.2 ESRD (END STAGE RENAL DISEASE) ON DIALYSIS: ICD-10-CM

## 2023-04-28 DIAGNOSIS — Z99.2 ESRD (END STAGE RENAL DISEASE) ON DIALYSIS: Primary | ICD-10-CM

## 2023-04-28 DIAGNOSIS — N18.6 ESRD (END STAGE RENAL DISEASE) ON DIALYSIS: ICD-10-CM

## 2023-04-28 DIAGNOSIS — N18.6 ESRD (END STAGE RENAL DISEASE) ON DIALYSIS: Primary | ICD-10-CM

## 2023-04-28 PROCEDURE — 99024 POSTOP FOLLOW-UP VISIT: CPT | Mod: POP,,, | Performed by: SURGERY

## 2023-04-28 PROCEDURE — 99024 PR POST-OP FOLLOW-UP VISIT: ICD-10-PCS | Mod: POP,,, | Performed by: SURGERY

## 2023-04-28 PROCEDURE — 93990 DOPPLER FLOW TESTING: CPT | Mod: PBBFAC | Performed by: SURGERY

## 2023-04-28 PROCEDURE — 93990 PR DUPLEX HEMODIALYSIS ACCESS: ICD-10-PCS | Mod: 26,S$PBB,, | Performed by: SURGERY

## 2023-04-28 PROCEDURE — 99999 PR PBB SHADOW E&M-EST. PATIENT-LVL IV: ICD-10-PCS | Mod: PBBFAC,,, | Performed by: SURGERY

## 2023-04-28 PROCEDURE — 93990 DOPPLER FLOW TESTING: CPT | Mod: 26,S$PBB,, | Performed by: SURGERY

## 2023-04-28 PROCEDURE — 99999 PR PBB SHADOW E&M-EST. PATIENT-LVL IV: CPT | Mod: PBBFAC,,, | Performed by: SURGERY

## 2023-04-28 PROCEDURE — 99214 OFFICE O/P EST MOD 30 MIN: CPT | Mod: PBBFAC | Performed by: SURGERY

## 2023-04-28 NOTE — H&P (VIEW-ONLY)
VASCULAR SURGERY SERVICE    CHIEF COMPLAINT:  Functional left AV graft dysfunctional left AV graft    HISTORY OF PRESENT ILLNESS: Eva Bloom is a 57 y.o. female end-stage renal disease, with multiple serious medical comorbidities including admission for sepsis 2 months ago, EF 20%, AFib on Eliquis, who is having increasing bleeding after her dialysis runs.  I placed a revision, left AV graft with long interposition Accu Seal 12/28/2022.  She has had uninterrupted use of this graft ever since.    Recent weeks she is had increased bleeding after dialysis sticks    S/p:    1a. PTA, L AVG 4/19/2023  Excision, excluded L AVG and permacath 1/25/2023  revision, left AV graft with long interposition Accu Seal 12/28/2022  Original left AV graft 2017 with subsequent covered stent placement in venous outflow    03/10/2023:  She continues use the wound VAC on the medial excised site, with Medihoney to the small down the lateral counter incision of the new graft    3/31/2023:  2 week follow-up.  She is without complaint    04/14/2023:  She is without complaint.  Continues Medihoney on the wound    04/28/2023:  She now returns after recent fistulogram.    Past Medical History:   Diagnosis Date    Anemia     Bronchitis 03/01/2017    Cancer 2016    kidney cancer    CHF (congestive heart failure), NYHA class II, chronic, systolic     CMV (cytomegalovirus) antibody positive     ESRD (end stage renal disease)     Essential hypertension 09/23/2015    H/O herpes simplex type 2 infection     Herpes simplex type 1 antibody positive     History of kidney cancer     s/p left nephrectomy 1/2016    Hyperparathyroidism, unspecified     Hyperuricemia without signs of inflammatory arthritis and tophaceous disease     Kidney stones     LGSIL (low grade squamous intraepithelial dysplasia)     Myocardiopathy 07/21/2017    Prediabetes     Proteinuria     Renal disorder     Thyroid nodule     Urate nephropathy        Past Surgical History:    Procedure Laterality Date    BREAST CYST EXCISION      COLONOSCOPY N/A 11/12/2015    COLONOSCOPY N/A 3/12/2021    Procedure: COLONOSCOPY;  Surgeon: Brendon Lanier MD;  Location: [facility] ENDO;  Service: Endoscopy;  Laterality: N/A;  covid test 3/9, labs prior, prep instr mailed -ml    INSERTION OF BIVENTRICULAR IMPLANTABLE CARDIOVERTER-DEFIBRILLATOR (ICD)  04/2021    INSERTION OF TUNNELED CENTRAL VENOUS CATHETER (CVC) WITH SUBCUTANEOUS PORT N/A 1/25/2023    Procedure: INSERTION, DUAL LUMEN CATHETER WITH PORT, WITH IMAGING GUIDANCE;  Surgeon: FARIDEH Muñoz III, MD;  [facility];  Service: Peripheral Vascular;  Laterality: N/A;  possinble permcath placment    NEPHRECTOMY-LAPAROSCOPIC Left 01/12/2016    PERCUTANEOUS TRANSLUMINAL ANGIOPLASTY OF ARTERIOVENOUS FISTULA Left 4/19/2023    Procedure: PTA, AV FISTULA;  Surgeon: FARIDEH Muñoz III, MD;  Location: [facility] CATH LAB;  Service: Peripheral Vascular;  Laterality: Left;    PERITONEAL CATHETER INSERTION      Permacath insertion  01/12/2017    PLACEMENT OF ARTERIOVENOUS GRAFT  12/28/2022    Procedure: INSERTION, GRAFT, ARTERIOVENOUS;  Surgeon: FARIDEH Muñoz III, MD;  Location: [facility] OR [facility]LR;  Service: Peripheral Vascular;;    REMOVAL, GRAFT, ARTERIOVENOUS, UPPER EXTREMITY Left 1/25/2023    Procedure: REMOVAL, GRAFT, ARTERIOVENOUS, UPPER EXTREMITY;  Surgeon: FARIDEH Muñoz III, MD;  Location: [facility] OR [facility] FLR;  Service: Peripheral Vascular;  Laterality: Left;    REVISION OF PROCEDURE INVOLVING ARTERIOVENOUS GRAFT Left 12/28/2022    Procedure: REVISION, PROCEDURE INVOLVING ARTERIOVENOUS GRAFT;  Surgeon: FARIDEH Muñoz III, MD;  Location: [facility] OR [facility] FLR;  Service: Peripheral Vascular;  Laterality: Left;    SALPINGOOPHORECTOMY Right 2016    KJB---DAVINCI    TONSILLECTOMY      TUBAL LIGATION           Current Outpatient Medications:     acetaminophen (TYLENOL) 500 MG tablet, Take 500 mg by mouth every 6 (six) hours as needed for Pain., Disp: , Rfl:      apixaban (ELIQUIS) 2.5 mg Tab, Take 1 tablet (2.5 mg total) by mouth 2 (two) times daily., Disp: 60 tablet, Rfl: 11    cabozantinib (CABOMETYX) 60 mg Tab, TAKE ONE TABLET BY MOUTH ONCE DAILY AT THE SAME TIME ON AN EMPTY STOMACH AT LEAST 1 HOUR BEFORE OR 2 HOURS AFTER EATING. AVOID GRAPEFRUIT PRODUCTS, Disp: 30 tablet, Rfl: 4    cloNIDine 0.3 mg/24 hr td ptwk (CATAPRES) 0.3 mg/24 hr, Place 1 patch onto the skin every 7 days., Disp: 4 patch, Rfl: 11    gabapentin (NEURONTIN) 100 MG capsule, Take 1 capsule (100 mg total) by mouth once daily., Disp: 30 capsule, Rfl: 11    HYDROcodone-acetaminophen (NORCO) 5-325 mg per tablet, Take 1 tablet by mouth every 4 (four) hours as needed for Pain. Causes drowsiness. Do not drive or operate machinery with this medication. Do not drink alcohol with this medication. Causes constipation. Take with stool softener., Disp: 10 tablet, Rfl: 0    levothyroxine (SYNTHROID) 75 MCG tablet, Take 1 tablet (75 mcg total) by mouth once daily., Disp: 30 tablet, Rfl: 11    lidocaine-prilocaine (EMLA) cream, APPLY ATLEAST 30 MINUTES BEFORE TREATMENT 3 TIMES A WEEK, Disp: 30 g, Rfl: 11    metoprolol succinate (TOPROL-XL) 100 MG 24 hr tablet, Take 1/2 tablet (50mg) once daily, Disp: 90 tablet, Rfl: 3    mv,Ca,min-folic acid-vit K1 (ONE-A-DAY WOMEN'S 50 PLUS) 400-20 mcg Tab, Take 1 tablet by mouth once daily., Disp: , Rfl:     naloxone (NARCAN) 1 mg/mL injection, 2 mg (1 mg per nostril) by Nasal route as needed for opioid overdose; may repeat in 3 to 5 minutes if not effective. Call 911, Disp: 2 mL, Rfl: 11    oxyCODONE (OXY-IR) 5 mg Cap, Take 1 capsule (5 mg total) by mouth every 6 (six) hours as needed for Pain., Disp: 12 capsule, Rfl: 0    polyethylene glycol (GLYCOLAX) 17 gram/dose powder, Take 17 g by mouth once daily., Disp: 235 g, Rfl: 0    promethazine (PHENERGAN) 25 MG tablet, Take 1 tablet (25 mg total) by mouth every 6 (six) hours as needed for Nausea., Disp: 15 tablet, Rfl: 0     "sacubitriL-valsartan (ENTRESTO) 49-51 mg per tablet, Take 1 tablet by mouth 2 (two) times daily., Disp: 60 tablet, Rfl: 11    hydrALAZINE (APRESOLINE) 100 MG tablet, Take 1 tablet (100 mg total) by mouth every 12 (twelve) hours., Disp: 180 tablet, Rfl: 2    Review of patient's allergies indicates:   Allergen Reactions    Coreg [carvedilol] Other (See Comments)     Nausea/vomiting    Allopurinol      Other reaction(s): abnormal transaminases       Family History   Problem Relation Age of Onset    Hypertension Mother     Diabetes Father     Kidney disease Father     No Known Problems Son     No Known Problems Son     Diabetes Maternal Grandfather     No Known Problems Sister     No Known Problems Brother     No Known Problems Maternal Grandmother     No Known Problems Paternal Grandmother     No Known Problems Paternal Grandfather     Heart disease Sister     No Known Problems Sister     No Known Problems Brother     No Known Problems Maternal Aunt     No Known Problems Maternal Uncle     No Known Problems Paternal Aunt     No Known Problems Paternal Uncle     Breast cancer Neg Hx     Colon cancer Neg Hx     Cancer Neg Hx     Stroke Neg Hx     Amblyopia Neg Hx     Blindness Neg Hx     Cataracts Neg Hx     Glaucoma Neg Hx     Macular degeneration Neg Hx     Retinal detachment Neg Hx     Strabismus Neg Hx     Thyroid disease Neg Hx        Social History     Tobacco Use    Smoking status: Never    Smokeless tobacco: Never   Substance Use Topics    Alcohol use: No     Comment: . 2 children. works at [employer].    Drug use: No       PHYSICAL EXAM:   /71 (BP Location: Right arm, Patient Position: Sitting, BP Method: Large (Automatic))   Pulse 82   Temp 97.6 °F (36.4 °C) (Oral)   Ht 5' 4" (1.626 m)   Wt 46 kg (101 lb 6.6 oz)   LMP 10/24/2016   BMI 17.41 kg/m²   Constitutional:  Alert,   Well-appearing  In no distress.   Neurological: Normal speech  no focal findings  CN II - XII grossly intact.  "   Psychiatric: Mood and affect appropriate and symmetric.   HEENT: Normocephalic / atraumatic  PERRLA  Midline trachea  No scars across the neck   Cardiac: Regular rate and rhythm.   Pulmonary: Normal pulmonary effort.   Abdomen: Soft, not distended.     Skin: Warm and well perfused.    Vascular:  Radial pulses are nonpalpable bilaterally.   Extremities/  Musculoskeletal: No edema.   Left arm demonstrates a good thrill, minimal pulsatility.  Prior open wound is essentially completely healed.  The lateral counter incision has a persistent nonhealing area, no erythema or drainage however   3/31/2023          IMAGING:  Prior Duplex of the graft shows a new very high-grade venous outflow stenosis with a velocity of 681, flow volume 1.5 L down from 1.9      IMPRESSION:     nonhealing counter incision, left AV graft.  Believe this needs to be fresh and then reclosed, otherwise this puts this graft at risk for subsequent infection   2.  10 weeks status post excision, infected excluded AV graft.  Persistent fibrinous area in the upper part of the excised area.  Counter incision now completely healed    PLAN   Revision, left AV graft Monday 05/01/2023.  This will only involve small skin incision and we closing of the counter incision, no graft revision, operative time less than 30 minutes Hold Eliquis for 2 days prior  Regional block  Hold Eliquis 2 days prior    I have explained the risks, benefits and alternatives for this procedure in detail.  The patient voices understanding and all questions have be answered, and agrees to proceed with the procedure.     MANPREET Muñoz III, MD, FACS  Professor and Chief, Vascular and Endovascular Surgery

## 2023-04-28 NOTE — PROGRESS NOTES
VASCULAR SURGERY SERVICE    CHIEF COMPLAINT:  Functional left AV graft dysfunctional left AV graft    HISTORY OF PRESENT ILLNESS: Eva Bloom is a 57 y.o. female end-stage renal disease, with multiple serious medical comorbidities including admission for sepsis 2 months ago, EF 20%, AFib on Eliquis, who is having increasing bleeding after her dialysis runs.  I placed a revision, left AV graft with long interposition Accu Seal 12/28/2022.  She has had uninterrupted use of this graft ever since.    Recent weeks she is had increased bleeding after dialysis sticks    S/p:    1a. PTA, L AVG 4/19/2023  Excision, excluded L AVG and permacath 1/25/2023  revision, left AV graft with long interposition Accu Seal 12/28/2022  Original left AV graft 2017 with subsequent covered stent placement in venous outflow    03/10/2023:  She continues use the wound VAC on the medial excised site, with Medihoney to the small down the lateral counter incision of the new graft    3/31/2023:  2 week follow-up.  She is without complaint    04/14/2023:  She is without complaint.  Continues Medihoney on the wound    04/28/2023:  She now returns after recent fistulogram.    Past Medical History:   Diagnosis Date    Anemia     Bronchitis 03/01/2017    Cancer 2016    kidney cancer    CHF (congestive heart failure), NYHA class II, chronic, systolic     CMV (cytomegalovirus) antibody positive     ESRD (end stage renal disease)     Essential hypertension 09/23/2015    H/O herpes simplex type 2 infection     Herpes simplex type 1 antibody positive     History of kidney cancer     s/p left nephrectomy 1/2016    Hyperparathyroidism, unspecified     Hyperuricemia without signs of inflammatory arthritis and tophaceous disease     Kidney stones     LGSIL (low grade squamous intraepithelial dysplasia)     Myocardiopathy 07/21/2017    Prediabetes     Proteinuria     Renal disorder     Thyroid nodule     Urate nephropathy        Past Surgical History:    Procedure Laterality Date    BREAST CYST EXCISION      COLONOSCOPY N/A 11/12/2015    COLONOSCOPY N/A 3/12/2021    Procedure: COLONOSCOPY;  Surgeon: Brendon Lanier MD;  Location: [facility] ENDO;  Service: Endoscopy;  Laterality: N/A;  covid test 3/9, labs prior, prep instr mailed -ml    INSERTION OF BIVENTRICULAR IMPLANTABLE CARDIOVERTER-DEFIBRILLATOR (ICD)  04/2021    INSERTION OF TUNNELED CENTRAL VENOUS CATHETER (CVC) WITH SUBCUTANEOUS PORT N/A 1/25/2023    Procedure: INSERTION, DUAL LUMEN CATHETER WITH PORT, WITH IMAGING GUIDANCE;  Surgeon: FARIDEH Muñoz III, MD;  [facility];  Service: Peripheral Vascular;  Laterality: N/A;  possinble permcath placment    NEPHRECTOMY-LAPAROSCOPIC Left 01/12/2016    PERCUTANEOUS TRANSLUMINAL ANGIOPLASTY OF ARTERIOVENOUS FISTULA Left 4/19/2023    Procedure: PTA, AV FISTULA;  Surgeon: FARIDEH Muñoz III, MD;  Location: [facility] CATH LAB;  Service: Peripheral Vascular;  Laterality: Left;    PERITONEAL CATHETER INSERTION      Permacath insertion  01/12/2017    PLACEMENT OF ARTERIOVENOUS GRAFT  12/28/2022    Procedure: INSERTION, GRAFT, ARTERIOVENOUS;  Surgeon: FARIDEH Muñoz III, MD;  Location: [facility] OR [facility]LR;  Service: Peripheral Vascular;;    REMOVAL, GRAFT, ARTERIOVENOUS, UPPER EXTREMITY Left 1/25/2023    Procedure: REMOVAL, GRAFT, ARTERIOVENOUS, UPPER EXTREMITY;  Surgeon: FARIDEH Muñoz III, MD;  Location: [facility] OR [facility];  Service: Peripheral Vascular;  Laterality: Left;    REVISION OF PROCEDURE INVOLVING ARTERIOVENOUS GRAFT Left 12/28/2022    Procedure: REVISION, PROCEDURE INVOLVING ARTERIOVENOUS GRAFT;  Surgeon: FARIDEH Muñoz III, MD;  Location: [facility] OR [facility];  Service: Peripheral Vascular;  Laterality: Left;    SALPINGOOPHORECTOMY Right 2016    KJB---DAVINCI    TONSILLECTOMY      TUBAL LIGATION           Current Outpatient Medications:     acetaminophen (TYLENOL) 500 MG tablet, Take 500 mg by mouth every 6 (six) hours as needed for Pain., Disp: , Rfl:      apixaban (ELIQUIS) 2.5 mg Tab, Take 1 tablet (2.5 mg total) by mouth 2 (two) times daily., Disp: 60 tablet, Rfl: 11    cabozantinib (CABOMETYX) 60 mg Tab, TAKE ONE TABLET BY MOUTH ONCE DAILY AT THE SAME TIME ON AN EMPTY STOMACH AT LEAST 1 HOUR BEFORE OR 2 HOURS AFTER EATING. AVOID GRAPEFRUIT PRODUCTS, Disp: 30 tablet, Rfl: 4    cloNIDine 0.3 mg/24 hr td ptwk (CATAPRES) 0.3 mg/24 hr, Place 1 patch onto the skin every 7 days., Disp: 4 patch, Rfl: 11    gabapentin (NEURONTIN) 100 MG capsule, Take 1 capsule (100 mg total) by mouth once daily., Disp: 30 capsule, Rfl: 11    HYDROcodone-acetaminophen (NORCO) 5-325 mg per tablet, Take 1 tablet by mouth every 4 (four) hours as needed for Pain. Causes drowsiness. Do not drive or operate machinery with this medication. Do not drink alcohol with this medication. Causes constipation. Take with stool softener., Disp: 10 tablet, Rfl: 0    levothyroxine (SYNTHROID) 75 MCG tablet, Take 1 tablet (75 mcg total) by mouth once daily., Disp: 30 tablet, Rfl: 11    lidocaine-prilocaine (EMLA) cream, APPLY ATLEAST 30 MINUTES BEFORE TREATMENT 3 TIMES A WEEK, Disp: 30 g, Rfl: 11    metoprolol succinate (TOPROL-XL) 100 MG 24 hr tablet, Take 1/2 tablet (50mg) once daily, Disp: 90 tablet, Rfl: 3    mv,Ca,min-folic acid-vit K1 (ONE-A-DAY WOMEN'S 50 PLUS) 400-20 mcg Tab, Take 1 tablet by mouth once daily., Disp: , Rfl:     naloxone (NARCAN) 1 mg/mL injection, 2 mg (1 mg per nostril) by Nasal route as needed for opioid overdose; may repeat in 3 to 5 minutes if not effective. Call 911, Disp: 2 mL, Rfl: 11    oxyCODONE (OXY-IR) 5 mg Cap, Take 1 capsule (5 mg total) by mouth every 6 (six) hours as needed for Pain., Disp: 12 capsule, Rfl: 0    polyethylene glycol (GLYCOLAX) 17 gram/dose powder, Take 17 g by mouth once daily., Disp: 235 g, Rfl: 0    promethazine (PHENERGAN) 25 MG tablet, Take 1 tablet (25 mg total) by mouth every 6 (six) hours as needed for Nausea., Disp: 15 tablet, Rfl: 0     "sacubitriL-valsartan (ENTRESTO) 49-51 mg per tablet, Take 1 tablet by mouth 2 (two) times daily., Disp: 60 tablet, Rfl: 11    hydrALAZINE (APRESOLINE) 100 MG tablet, Take 1 tablet (100 mg total) by mouth every 12 (twelve) hours., Disp: 180 tablet, Rfl: 2    Review of patient's allergies indicates:   Allergen Reactions    Coreg [carvedilol] Other (See Comments)     Nausea/vomiting    Allopurinol      Other reaction(s): abnormal transaminases       Family History   Problem Relation Age of Onset    Hypertension Mother     Diabetes Father     Kidney disease Father     No Known Problems Son     No Known Problems Son     Diabetes Maternal Grandfather     No Known Problems Sister     No Known Problems Brother     No Known Problems Maternal Grandmother     No Known Problems Paternal Grandmother     No Known Problems Paternal Grandfather     Heart disease Sister     No Known Problems Sister     No Known Problems Brother     No Known Problems Maternal Aunt     No Known Problems Maternal Uncle     No Known Problems Paternal Aunt     No Known Problems Paternal Uncle     Breast cancer Neg Hx     Colon cancer Neg Hx     Cancer Neg Hx     Stroke Neg Hx     Amblyopia Neg Hx     Blindness Neg Hx     Cataracts Neg Hx     Glaucoma Neg Hx     Macular degeneration Neg Hx     Retinal detachment Neg Hx     Strabismus Neg Hx     Thyroid disease Neg Hx        Social History     Tobacco Use    Smoking status: Never    Smokeless tobacco: Never   Substance Use Topics    Alcohol use: No     Comment: . 2 children. works at [employer].    Drug use: No       PHYSICAL EXAM:   /71 (BP Location: Right arm, Patient Position: Sitting, BP Method: Large (Automatic))   Pulse 82   Temp 97.6 °F (36.4 °C) (Oral)   Ht 5' 4" (1.626 m)   Wt 46 kg (101 lb 6.6 oz)   LMP 10/24/2016   BMI 17.41 kg/m²   Constitutional:  Alert,   Well-appearing  In no distress.   Neurological: Normal speech  no focal findings  CN II - XII grossly intact.  "   Psychiatric: Mood and affect appropriate and symmetric.   HEENT: Normocephalic / atraumatic  PERRLA  Midline trachea  No scars across the neck   Cardiac: Regular rate and rhythm.   Pulmonary: Normal pulmonary effort.   Abdomen: Soft, not distended.     Skin: Warm and well perfused.    Vascular:  Radial pulses are nonpalpable bilaterally.   Extremities/  Musculoskeletal: No edema.   Left arm demonstrates a good thrill, minimal pulsatility.  Prior open wound is essentially completely healed.  The lateral counter incision has a persistent nonhealing area, no erythema or drainage however   3/31/2023          IMAGING:  Prior Duplex of the graft shows a new very high-grade venous outflow stenosis with a velocity of 681, flow volume 1.5 L down from 1.9      IMPRESSION:     nonhealing counter incision, left AV graft.  Believe this needs to be fresh and then reclosed, otherwise this puts this graft at risk for subsequent infection   2.  10 weeks status post excision, infected excluded AV graft.  Persistent fibrinous area in the upper part of the excised area.  Counter incision now completely healed    PLAN   Revision, left AV graft Monday 05/01/2023.  This will only involve small skin incision and we closing of the counter incision, no graft revision, operative time less than 30 minutes Hold Eliquis for 2 days prior  Regional block  Hold Eliquis 2 days prior    I have explained the risks, benefits and alternatives for this procedure in detail.  The patient voices understanding and all questions have be answered, and agrees to proceed with the procedure.     MANPERET Muñoz III, MD, FACS  Professor and Chief, Vascular and Endovascular Surgery

## 2023-05-01 ENCOUNTER — HOSPITAL ENCOUNTER (OUTPATIENT)
Facility: HOSPITAL | Age: 58
Discharge: HOME | End: 2023-05-01
Attending: SURGERY | Admitting: SURGERY
Payer: MEDICARE

## 2023-05-01 ENCOUNTER — ANESTHESIA EVENT (OUTPATIENT)
Dept: SURGERY | Facility: HOSPITAL | Age: 58
End: 2023-05-01
Payer: MEDICARE

## 2023-05-01 ENCOUNTER — ANESTHESIA (OUTPATIENT)
Dept: SURGERY | Facility: HOSPITAL | Age: 58
End: 2023-05-01
Payer: MEDICARE

## 2023-05-01 ENCOUNTER — DOCUMENTATION ONLY (OUTPATIENT)
Dept: ELECTROPHYSIOLOGY | Facility: CLINIC | Age: 58
End: 2023-05-01
Payer: COMMERCIAL

## 2023-05-01 VITALS
WEIGHT: 101.44 LBS | RESPIRATION RATE: 20 BRPM | HEART RATE: 88 BPM | OXYGEN SATURATION: 96 % | HEIGHT: 64 IN | TEMPERATURE: 98 F | SYSTOLIC BLOOD PRESSURE: 128 MMHG | DIASTOLIC BLOOD PRESSURE: 72 MMHG | BODY MASS INDEX: 17.32 KG/M2

## 2023-05-01 DIAGNOSIS — Z01.818 PRE-OP EVALUATION: ICD-10-CM

## 2023-05-01 DIAGNOSIS — Z99.2 ESRD (END STAGE RENAL DISEASE) ON DIALYSIS: Primary | ICD-10-CM

## 2023-05-01 DIAGNOSIS — N18.6 ESRD (END STAGE RENAL DISEASE) ON DIALYSIS: Primary | ICD-10-CM

## 2023-05-01 PROCEDURE — 63600175 PHARM REV CODE 636 W HCPCS: Performed by: SURGERY

## 2023-05-01 PROCEDURE — D9220A PRA ANESTHESIA: Mod: CRNA,,, | Performed by: NURSE ANESTHETIST, CERTIFIED REGISTERED

## 2023-05-01 PROCEDURE — 25000003 PHARM REV CODE 250: Performed by: NURSE ANESTHETIST, CERTIFIED REGISTERED

## 2023-05-01 PROCEDURE — 37000008 HC ANESTHESIA 1ST 15 MINUTES: Performed by: SURGERY

## 2023-05-01 PROCEDURE — 36832 PR AV FISTULA REVISION, OPEN, W/O THROMBECTOMY: ICD-10-PCS | Mod: ,,, | Performed by: SURGERY

## 2023-05-01 PROCEDURE — 71000015 HC POSTOP RECOV 1ST HR: Performed by: SURGERY

## 2023-05-01 PROCEDURE — 37000009 HC ANESTHESIA EA ADD 15 MINS: Performed by: SURGERY

## 2023-05-01 PROCEDURE — D9220A PRA ANESTHESIA: ICD-10-PCS | Mod: ANES,,, | Performed by: ANESTHESIOLOGY

## 2023-05-01 PROCEDURE — 36000707: Performed by: SURGERY

## 2023-05-01 PROCEDURE — D9220A PRA ANESTHESIA: ICD-10-PCS | Mod: CRNA,,, | Performed by: NURSE ANESTHETIST, CERTIFIED REGISTERED

## 2023-05-01 PROCEDURE — 36000706: Performed by: SURGERY

## 2023-05-01 PROCEDURE — 63600175 PHARM REV CODE 636 W HCPCS

## 2023-05-01 PROCEDURE — 36832 AV FISTULA REVISION OPEN: CPT | Mod: ,,, | Performed by: SURGERY

## 2023-05-01 PROCEDURE — D9220A PRA ANESTHESIA: Mod: ANES,,, | Performed by: ANESTHESIOLOGY

## 2023-05-01 PROCEDURE — 71000044 HC DOSC ROUTINE RECOVERY FIRST HOUR: Performed by: SURGERY

## 2023-05-01 PROCEDURE — 63600175 PHARM REV CODE 636 W HCPCS: Performed by: NURSE ANESTHETIST, CERTIFIED REGISTERED

## 2023-05-01 PROCEDURE — 25000003 PHARM REV CODE 250

## 2023-05-01 RX ORDER — HYDROCODONE BITARTRATE AND ACETAMINOPHEN 5; 325 MG/1; MG/1
1 TABLET ORAL EVERY 6 HOURS PRN
Qty: 15 TABLET | Refills: 0 | Status: ON HOLD | OUTPATIENT
Start: 2023-05-01 | End: 2023-07-29 | Stop reason: HOSPADM

## 2023-05-01 RX ORDER — DEXMEDETOMIDINE HYDROCHLORIDE 100 UG/ML
INJECTION, SOLUTION INTRAVENOUS
Status: DISCONTINUED | OUTPATIENT
Start: 2023-05-01 | End: 2023-05-01

## 2023-05-01 RX ORDER — SODIUM CHLORIDE 9 MG/ML
INJECTION, SOLUTION INTRAVENOUS CONTINUOUS
Status: ACTIVE | OUTPATIENT
Start: 2023-05-01

## 2023-05-01 RX ORDER — HYDROCODONE BITARTRATE AND ACETAMINOPHEN 5; 325 MG/1; MG/1
1 TABLET ORAL EVERY 4 HOURS PRN
Status: DISCONTINUED | OUTPATIENT
Start: 2023-05-01 | End: 2023-05-01 | Stop reason: HOSPADM

## 2023-05-01 RX ORDER — KETAMINE HCL IN 0.9 % NACL 50 MG/5 ML
SYRINGE (ML) INTRAVENOUS
Status: DISCONTINUED | OUTPATIENT
Start: 2023-05-01 | End: 2023-05-01

## 2023-05-01 RX ORDER — HEPARIN SODIUM 1000 [USP'U]/ML
INJECTION, SOLUTION INTRAVENOUS; SUBCUTANEOUS
Status: DISCONTINUED | OUTPATIENT
Start: 2023-05-01 | End: 2023-05-01 | Stop reason: HOSPADM

## 2023-05-01 RX ORDER — FENTANYL CITRATE 50 UG/ML
INJECTION, SOLUTION INTRAMUSCULAR; INTRAVENOUS
Status: DISCONTINUED | OUTPATIENT
Start: 2023-05-01 | End: 2023-05-01

## 2023-05-01 RX ORDER — MIDAZOLAM HYDROCHLORIDE 1 MG/ML
INJECTION, SOLUTION INTRAMUSCULAR; INTRAVENOUS
Status: DISCONTINUED | OUTPATIENT
Start: 2023-05-01 | End: 2023-05-01

## 2023-05-01 RX ADMIN — Medication 10 MG: at 07:05

## 2023-05-01 RX ADMIN — MIDAZOLAM HYDROCHLORIDE 1 MG: 1 INJECTION, SOLUTION INTRAMUSCULAR; INTRAVENOUS at 07:05

## 2023-05-01 RX ADMIN — MEPIVACAINE HYDROCHLORIDE 35 ML: 15 INJECTION, SOLUTION EPIDURAL; INFILTRATION at 07:05

## 2023-05-01 RX ADMIN — FENTANYL CITRATE 25 MCG: 50 INJECTION, SOLUTION INTRAMUSCULAR; INTRAVENOUS at 07:05

## 2023-05-01 RX ADMIN — DEXMEDETOMIDINE HYDROCHLORIDE 10 MCG: 100 INJECTION, SOLUTION INTRAVENOUS at 07:05

## 2023-05-01 RX ADMIN — CEFAZOLIN 2 G: 2 INJECTION, POWDER, FOR SOLUTION INTRAMUSCULAR; INTRAVENOUS at 07:05

## 2023-05-01 RX ADMIN — SODIUM CHLORIDE: 9 INJECTION, SOLUTION INTRAVENOUS at 07:05

## 2023-05-01 NOTE — TRANSFER OF CARE
"Anesthesia Transfer of Care Note    Patient: Eva Bloom    Procedure(s) Performed: Procedure(s) (LRB):  REVISION, AV FISTULA (Left)    Patient location: [facility]    Anesthesia Type: MAC    Transport from OR: Transported from OR on room air with adequate spontaneous ventilation    Post pain: adequate analgesia    Post assessment: no apparent anesthetic complications    Post vital signs: stable    Level of consciousness: awake    Nausea/Vomiting: no nausea/vomiting    Complications: none    Transfer of care protocol was followed      Last vitals:   Visit Vitals  /74 (BP Location: Right leg, Patient Position: Lying)   Pulse 95   Temp 36.8 °C (98.2 °F) (Temporal)   Resp 14   Ht 5' 4" (1.626 m)   Wt 46 kg (101 lb 6.6 oz)   LMP 10/24/2016   SpO2 (!) 92%   Breastfeeding No   BMI 17.41 kg/m²     "

## 2023-05-01 NOTE — BRIEF OP NOTE
[facility] - Surgery ([facility])  Brief Operative Note    Surgery Date: 5/1/2023     Surgeon(s) and Role:     * FARIDEH Muñoz III, MD - Primary     * Brenden Celeste MD - Fellow    Assisting Surgeon: None    Pre-op Diagnosis:  ESRD (end stage renal disease) on dialysis [N18.6, Z99.2]    Post-op Diagnosis:  Post-Op Diagnosis Codes:     * ESRD (end stage renal disease) on dialysis [N18.6, Z99.2]    Procedure(s) (LRB):  REVISION, AV FISTULA (Left)    Anesthesia: Regional    Operative Findings: Skin over counterincision excised and primarily closed    Estimated Blood Loss: <10cc         Specimens:   Specimen (24h ago, onward)      None              Discharge Note    OUTCOME: successful outpatient procedure     DISPOSITION: home    FINAL DIAGNOSIS:  malfunction AVG    FOLLOWUP: 3 weeks, no studies    DISCHARGE INSTRUCTIONS:  see below

## 2023-05-01 NOTE — PATIENT INSTRUCTIONS
VASCULAR SURGERY DISCHARGE INSTRUCTIONS    Woundcare:  - Take your incision dressing off 2 days after your surgery and gently rinse your incision with soap and water daily. Pad the incision dry afterward  - If you have a dialysis catheter in place, keep your catheter dressing clean and dry  - If you do not have a catheter, take a full shower daily beginning 2 days after the surgery. Allow soap and water to run over your incision. Pad the incision dry afterward  - When resting or sleeping, try to keep your arm elevated to shoulder level on pillows to reduce swelling  - If you notice clear drainage from your incision, you can apply dry gauze daily and secure in place with tape or gentle elastic wrap    Activity:  - Avoid prolonged exertion of the affected arm  - Avoid keeping your arm down below your chest for prolonged periods of time (this could lead to increased swelling)  - No heavy lifting with the affected arm  - Sleep with your arm elevated on pillows at night to reduce swelling  -- No swimming in pools, lakes, [place] etc. for 6 weeks after your surgery    Diet:  -Resume your pre-operative home diet    Follow up:  -Refer to follow up instructions     Call Vascular Surgery Office at [phone] if you experience:  -Increased redness, warmth, tenderness, or draining pus at your incision(s)  -Worsening fevers, chills, nausea/vomiting  -Pain, weakness, coldness, or numbness in your hand  -Uncontrolled pain  -Your call will be returned within 24 hours and further instructions will be provided    Go to ER/Urgent Care if you experience:  -Worsening shortness of breath or chest pain

## 2023-05-01 NOTE — ANESTHESIA POSTPROCEDURE EVALUATION
Anesthesia Post Evaluation    Patient: Eva Bloom    Procedure(s) Performed: Procedure(s) (LRB):  REVISION, AV FISTULA (Left)    Final Anesthesia Type: general      Patient location during evaluation: PACU  Patient participation: Yes- Able to Participate  Level of consciousness: awake and alert  Post-procedure vital signs: reviewed and stable  Pain management: adequate  Airway patency: patent    PONV status at discharge: No PONV  Anesthetic complications: no      Cardiovascular status: blood pressure returned to baseline  Respiratory status: unassisted  Hydration status: euvolemic  Follow-up not needed.          Vitals Value Taken Time   /72 05/01/23 0915   Temp 36.6 °C (97.9 °F) 05/01/23 0915   Pulse 88 05/01/23 0915   Resp 20 05/01/23 0915   SpO2 96 % 05/01/23 0915         No case tracking events are documented in the log.      Pain/Mimi Score: Mimi Score: 9 (5/1/2023  8:15 AM)

## 2023-05-01 NOTE — PLAN OF CARE
Discharge instructions given, to family member, verbalized understanding. Consents verified. Vitals back to baseline. Patient resting at this time

## 2023-05-01 NOTE — PROGRESS NOTES
Patient has been identified as having an implanted cardiac rhythm device (CRD); the implanted device is a [employer] S-ICD.      No noted pacer dependency.        Per protocol,a magnet should be applied directly over the implanted device during entire surgical procedure when electrocautery will be used.    If no electrocautery is planned, magnet application is not needed.      For additional questions, please contact the Arrhythmia Department at Ext [phone].

## 2023-05-01 NOTE — ANESTHESIA PREPROCEDURE EVALUATION
[facility]  Anesthesia Pre-Operative Evaluation         Patient Name: Eva Bloom  YOB: 1965  MRN: [id number]    SUBJECTIVE:     Pre-operative evaluation for Procedure(s) (LRB):  REVISION, AV FISTULA (Left: Arm)  INSERTION, DUAL LUMEN CATHETER WITH PORT, WITH IMAGING GUIDANCE (N/A)     05/01/2023    Eva Bloom is a 57 y.o. female w/ a significant PMHx of HTN, HLD, ESRD on HD (LUE AVF), CHF (EF 25%) s/p AICD, Afib (on Eliquis, last EKG 12/19/22 NSR), RCC (s/p L nephrectomy in 2016), Anemia. Admitted for sepsis 2 months ago, now with increased bleeding from AVF during dialysis and pain and erythema around AVF.    Patient now presents for the above procedure(s).    TTE 10/30/22:   The left ventricle is mildly enlarged with moderate concentric hypertrophy and severely decreased systolic function.   The estimated ejection fraction is 25%.   Grade II left ventricular diastolic dysfunction.   Normal right ventricular size with normal right ventricular systolic function.   Moderate left atrial enlargement.   Moderate right atrial enlargement.   There is mild aortic valve stenosis.   Aortic valve area is 2.11 cm2; peak velocity is 2.11 m/s; mean gradient is 10 mmHg.   Moderate mitral regurgitation.   Moderate tricuspid regurgitation.   Elevated central venous pressure (15 mmHg).   The estimated PA systolic pressure is 60 mmHg.   There is pulmonary hypertension.   Small to moderate circumferential pericardial effusion. No tamponade         LDA:   Percutaneous Central Line Insertion/Assessment - Triple Lumen  10/29/22 2139 left internal jugular (Active)   Number of days: 86            Hemodialysis AV Fistula Left upper arm (Active)   Dressing Intervention Integrity maintained 12/28/22 1050   Dressing Status Clean;Dry;Intact 12/28/22 1050   Number of days:        Prev airway:  Intubation     Date/Time: 4/19/2021 7:33 AM  Performed by: Gopal Patton Jr., CRNA  Authorized  by: Graham Bacon MD      Intubation:     Induction:  Intravenous    Intubated:  Postinduction    Mask Ventilation:  Easy mask    Attempts:  1    Attempted By:  CRNA    Method of Intubation:  Direct    Blade:  Tavo 3    Laryngeal View Grade: Grade I - full view of chords      Difficult Airway Encountered?: No      Complications:  None    Airway Device:  Oral endotracheal tube    Airway Device Size:  7.5    Style/Cuff Inflation:  Cuffed (inflated to minimal occlusive pressure)    Tube secured:  21    Secured at:  The lips    Placement Verified By:  Capnometry    Complicating Factors:  None    Findings Post-Intubation:  BS equal bilateral and atraumatic/condition of teeth unchanged          Drips: None documented.      Patient Active Problem List   Diagnosis    Kidney stones    Hyperparathyroidism, secondary renal    Hyperuricemia without signs of inflammatory arthritis and tophaceous disease    Proteinuria    Urate nephropathy    Essential hypertension    Pap smear abnormality of cervix with ASCUS favoring benign    CMV (cytomegalovirus) antibody positive    Herpes simplex type 1 antibody positive    H/O herpes simplex type 2 infection    Multinodular goiter    History of kidney cancer    S/p nephrectomy left    Stenosis of arteriovenous dialysis fistula    Primary insomnia    Cardiomyopathy, nonischemic    Pulmonary hypertension    ESRD (end stage renal disease) on dialysis    Nonrheumatic mitral valve regurgitation    Chronic combined systolic and diastolic congestive heart failure    Anemia in ESRD (end-stage renal disease)    Chronic kidney disease-mineral and bone disorder    Hyperphosphatemia    ICD (implantable cardioverter-defibrillator) in place - SubQ    Right-sided epistaxis    Left-sided epistaxis    Hypercalcemia    Glucose intolerance    PAF (paroxysmal atrial fibrillation)    Transaminitis    Debility    Malfunction of arteriovenous dialysis fistula     Clostridium difficile infection    Arteriovenous graft infection       Review of patient's allergies indicates:   Allergen Reactions    Coreg [carvedilol] Other (See Comments)     Nausea/vomiting    Allopurinol      Other reaction(s): abnormal transaminases       Current Inpatient Medications:      Current Facility-Administered Medications on File Prior to Visit   Medication Dose Route Frequency Provider Last Rate Last Admin    0.9%  NaCl infusion   Intravenous Continuous Soni Bhatti MD        HYDROcodone-acetaminophen 5-325 mg per tablet 1 tablet  1 tablet Oral Q4H PRN Brenden Celeste MD         Current Outpatient Medications on File Prior to Visit   Medication Sig Dispense Refill    acetaminophen (TYLENOL) 500 MG tablet Take 500 mg by mouth every 6 (six) hours as needed for Pain.      apixaban (ELIQUIS) 2.5 mg Tab Take 1 tablet (2.5 mg total) by mouth 2 (two) times daily. 60 tablet 11    cabozantinib (CABOMETYX) 60 mg Tab TAKE ONE TABLET BY MOUTH ONCE DAILY AT THE SAME TIME ON AN EMPTY STOMACH AT LEAST 1 HOUR BEFORE OR 2 HOURS AFTER EATING. AVOID GRAPEFRUIT PRODUCTS 30 tablet 4    cloNIDine 0.3 mg/24 hr td ptwk (CATAPRES) 0.3 mg/24 hr Place 1 patch onto the skin every 7 days. 4 patch 11    gabapentin (NEURONTIN) 100 MG capsule Take 1 capsule (100 mg total) by mouth once daily. 30 capsule 11    hydrALAZINE (APRESOLINE) 100 MG tablet Take 1 tablet (100 mg total) by mouth every 12 (twelve) hours. 180 tablet 2    HYDROcodone-acetaminophen (NORCO) 5-325 mg per tablet Take 1 tablet by mouth every 4 (four) hours as needed for Pain. Causes drowsiness. Do not drive or operate machinery with this medication. Do not drink alcohol with this medication. Causes constipation. Take with stool softener. 10 tablet 0    HYDROcodone-acetaminophen (NORCO) 5-325 mg per tablet Take 1 tablet by mouth every 6 (six) hours as needed for Pain. 15 tablet 0    levothyroxine (SYNTHROID) 75 MCG tablet Take 1 tablet (75 mcg total)  by mouth once daily. 30 tablet 11    lidocaine-prilocaine (EMLA) cream APPLY ATLEAST 30 MINUTES BEFORE TREATMENT 3 TIMES A WEEK 30 g 11    metoprolol succinate (TOPROL-XL) 100 MG 24 hr tablet Take 1/2 tablet (50mg) once daily 90 tablet 3    mv,Ca,min-folic acid-vit K1 (ONE-A-DAY WOMEN'S 50 PLUS) 400-20 mcg Tab Take 1 tablet by mouth once daily.      naloxone (NARCAN) 1 mg/mL injection 2 mg (1 mg per nostril) by Nasal route as needed for opioid overdose; may repeat in 3 to 5 minutes if not effective. Call 911 2 mL 11    oxyCODONE (OXY-IR) 5 mg Cap Take 1 capsule (5 mg total) by mouth every 6 (six) hours as needed for Pain. 12 capsule 0    polyethylene glycol (GLYCOLAX) 17 gram/dose powder Take 17 g by mouth once daily. 235 g 0    promethazine (PHENERGAN) 25 MG tablet Take 1 tablet (25 mg total) by mouth every 6 (six) hours as needed for Nausea. 15 tablet 0    sacubitriL-valsartan (ENTRESTO) 49-51 mg per tablet Take 1 tablet by mouth 2 (two) times daily. 60 tablet 11    [DISCONTINUED] amLODIPine (NORVASC) 5 MG tablet TAKE 1 TABLET BY MOUTH EVERY DAY 90 tablet 3       Past Surgical History:   Procedure Laterality Date    BREAST CYST EXCISION      COLONOSCOPY N/A 11/12/2015    COLONOSCOPY N/A 3/12/2021    Procedure: COLONOSCOPY;  Surgeon: Brendon Lanier MD;  Location: [facility];  Service: Endoscopy;  Laterality: N/A;  covid test 3/9, labs prior, prep instr mailed -ml    INSERTION OF BIVENTRICULAR IMPLANTABLE CARDIOVERTER-DEFIBRILLATOR (ICD)  04/2021    INSERTION OF TUNNELED CENTRAL VENOUS CATHETER (CVC) WITH SUBCUTANEOUS PORT N/A 1/25/2023    Procedure: INSERTION, DUAL LUMEN CATHETER WITH PORT, WITH IMAGING GUIDANCE;  Surgeon: FARIDEH Muñoz III, MD;  Location: [facility] OR [address] [place] [address];  Service: Peripheral Vascular;  Laterality: N/A;  possinble permcath placment    NEPHRECTOMY-LAPAROSCOPIC Left 01/12/2016    PERCUTANEOUS TRANSLUMINAL ANGIOPLASTY OF ARTERIOVENOUS FISTULA Left 4/19/2023    Procedure: PTA, AV  FISTULA;  Surgeon: FARIDEH Muñoz III, MD;  Location: [facility] CATH LAB;  Service: Peripheral Vascular;  Laterality: Left;    PERITONEAL CATHETER INSERTION      Permacath insertion  01/12/2017    PLACEMENT OF ARTERIOVENOUS GRAFT  12/28/2022    Procedure: INSERTION, GRAFT, ARTERIOVENOUS;  Surgeon: FARIDEH Muñoz III, MD;  [facility];  Service: Peripheral Vascular;;    REMOVAL, GRAFT, ARTERIOVENOUS, UPPER EXTREMITY Left 1/25/2023    Procedure: REMOVAL, GRAFT, ARTERIOVENOUS, UPPER EXTREMITY;  Surgeon: FARIDEH Muñoz III, MD;  [facility];  Service: Peripheral Vascular;  Laterality: Left;    REVISION OF PROCEDURE INVOLVING ARTERIOVENOUS GRAFT Left 12/28/2022    Procedure: REVISION, PROCEDURE INVOLVING ARTERIOVENOUS GRAFT;  Surgeon: FARIDEH Muñoz III, MD;  Location: [facility] OR [address] [place] [address];  Service: Peripheral Vascular;  Laterality: Left;    SALPINGOOPHORECTOMY Right 2016    KJB---DAVINCI    TONSILLECTOMY      TUBAL LIGATION         Social History     Socioeconomic History    Marital status:     Number of children: 2   Occupational History    Occupation: [employer]     Employer: [id number]   Tobacco Use    Smoking status: Never    Smokeless tobacco: Never   Substance and Sexual Activity    Alcohol use: No     Comment: . 2 children. works at [employer].    Drug use: No    Sexual activity: Yes     Partners: Male     Social Determinants of Health     Financial Resource Strain: Low Risk     Difficulty of Paying Living Expenses: Not hard at all   Food Insecurity: No Food Insecurity    Worried About Running Out of Food in the Last Year: Never true    Ran Out of Food in the Last Year: Never true   Transportation Needs: No Transportation Needs    Lack of Transportation (Medical): No    Lack of Transportation (Non-Medical): No   Physical Activity: Insufficiently Active    Days of Exercise per Week: 3 days    Minutes of Exercise per Session: 30 min   Stress: No Stress  Concern Present    Feeling of Stress : Not at all   Social Connections: Socially Integrated    Frequency of Communication with Friends and Family: More than three times a week    Frequency of Social Gatherings with Friends and Family: Once a week    Attends Cheondoism Services: 1 to 4 times per year    Active Member of Clubs or Organizations: Yes    Attends Club or Organization Meetings: 1 to 4 times per year    Marital Status:    Housing Stability: Low Risk     Unable to Pay for Housing in the Last Year: No    Number of Places Lived in the Last Year: 1    Unstable Housing in the Last Year: No       OBJECTIVE:     Vital Signs Range (Last 24H):  Temp:  [36.7 °C (98 °F)-36.8 °C (98.2 °F)]   Pulse:  [93-95]   Resp:  [14-18]   BP: (126-131)/(67-74)   SpO2:  [92 %-99 %]       Significant Labs:  Lab Results   Component Value Date    WBC 5.69 03/27/2023    HGB 10.9 (L) 03/27/2023    HCT 37.2 03/27/2023     03/27/2023    CHOL 101 (L) 10/22/2022    TRIG 97 10/22/2022    HDL 35 (L) 10/22/2022    ALT 11 03/27/2023    AST 31 03/27/2023     03/27/2023    K 3.6 03/27/2023     03/27/2023    CREATININE 5.7 (H) 03/27/2023    BUN 46 (H) 03/27/2023    CO2 24 03/27/2023    TSH 19.710 (H) 03/27/2023    INR 1.1 11/16/2022    HGBA1C 4.8 10/22/2022       Diagnostic Studies: No relevant studies.    EKG:   Results for orders placed or performed during the hospital encounter of 12/19/22   EKG 12-lead    Collection Time: 12/19/22 11:18 PM    Narrative    Test Reason : I95.9,    Vent. Rate : 079 BPM     Atrial Rate : 079 BPM     P-R Int : 170 ms          QRS Dur : 098 ms      QT Int : 416 ms       P-R-T Axes : 061 006 096 degrees     QTc Int : 477 ms    Normal sinus rhythm  Nonspecific T wave abnormality  Prolonged QT  Abnormal ECG  When compared with ECG of 19-DEC-2022 09:11,  No significant change was found  Confirmed by Shayne Walker MD ([id number]) on 12/20/2022 6:21:03 PM    Referred By: [email or web address]   SELF            Confirmed By:Shayne Walker MD       2D ECHO:  TTE:  Results for orders placed or performed during the hospital encounter of 10/29/22   Echo   Result Value Ref Range    BSA 1.53 m2    TDI SEPTAL 0.04 m/s    LV LATERAL E/E' RATIO 23.80 m/s    LV SEPTAL E/E' RATIO 29.75 m/s    LA WIDTH 4.80 cm    IVC diameter 1.96 cm    Left Ventricular Outflow Tract Mean Velocity 0.81 cm/s    Left Ventricular Outflow Tract Mean Gradient 3.09 mmHg    AORTIC VALVE CUSP SEPERATION 1.97 cm    TDI LATERAL 0.05 m/s    PV PEAK VELOCITY 1.00 cm/s    LVIDd 5.39 3.5 - 6.0 cm    IVS 1.53 (A) 0.6 - 1.1 cm    Posterior Wall 1.67 (A) 0.6 - 1.1 cm    Ao root annulus 3.35 cm    LVIDs 4.50 (A) 2.1 - 4.0 cm    FS 17 28 - 44 %    LA volume 90.24 cm3    Sinus 3.17 cm    STJ 2.62 cm    Ascending aorta 3.20 cm    LV mass 397.67 g    LA size 3.57 cm    RVDD 3.91 cm    TAPSE 2.21 cm    Left Ventricle Relative Wall Thickness 0.62 cm    AV regurgitation pressure 1/2 time [id number] ms    AV mean gradient 10 mmHg    AV valve area 2.11 cm2    AV Velocity Ratio 0.63     AV index (prosthetic) 0.68     MV valve area p 1/2 method 3.97 cm2    E/A ratio 1.09     Mean e' 0.05 m/s    E wave deceleration time 190.77 msec    LVOT diameter 1.99 cm    LVOT area 3.1 cm2    LVOT peak subhash 1.33 m/s    LVOT peak VTI 19.90 cm    Ao peak subhash 2.11 m/s    Ao VTI 29.3 cm    Mr max subhash 5.63 m/s    LVOT stroke volume 61.86 cm3    AV peak gradient 18 mmHg    E/E' ratio 26.44 m/s    MV Peak E Subhash 1.19 m/s    AR Max Subhash 2.80 m/s    TR Max Subhash 3.36 m/s    MV stenosis pressure 1/2 time 55.44 ms    MV Peak A Subhash 1.09 m/s    LV Systolic Volume 92.68 mL    LV Systolic Volume Index 61.0 mL/m2    LV Diastolic Volume 140.59 mL    LV Diastolic Volume Index 92.49 mL/m2    LA Volume Index 59.4 mL/m2    LV Mass Index 262 g/m2    RA Major Axis 6.11 cm    Left Atrium Minor Axis 6.51 cm    Left Atrium Major Axis 5.91 cm    Triscuspid Valve Regurgitation Peak Gradient 45 mmHg    RA Width 4.40  cm    Right Atrial Pressure (from IVC) 15 mmHg    EF 25 %    TV rest pulmonary artery pressure 60 mmHg    Narrative    · The left ventricle is mildly enlarged with moderate concentric   hypertrophy and severely decreased systolic function.  · The estimated ejection fraction is 25%.  · Grade II left ventricular diastolic dysfunction.  · Normal right ventricular size with normal right ventricular systolic   function.  · Moderate left atrial enlargement.  · Moderate right atrial enlargement.  · There is mild aortic valve stenosis.  · Aortic valve area is 2.11 cm2; peak velocity is 2.11 m/s; mean gradient   is 10 mmHg.  · Moderate mitral regurgitation.  · Moderate tricuspid regurgitation.  · Elevated central venous pressure (15 mmHg).  · The estimated PA systolic pressure is 60 mmHg.  · There is pulmonary hypertension.  · Small to moderate circumferential pericardial effusion. No tamponade          MUSA:  No results found. However, due to the size of the patient record, not all encounters were searched. Please check Results Review for a complete set of results.    ASSESSMENT/PLAN:                                                                                                                  05/01/2023  Eva Bloom is a 57 y.o., female.      Pre-op Assessment    I have reviewed the Patient Summary Reports.     I have reviewed the Nursing Notes. I have reviewed the NPO Status.   I have reviewed the Medications.     Review of Systems  Anesthesia Hx:  No previous Anesthesia  History of prior surgery of interest to airway management or planning: Denies Family Hx of Anesthesia complications.   Denies Personal Hx of Anesthesia complications.   Hematology/Oncology:  Hematology Normal       -- Cancer in past history:    EENT/Dental:EENT/Dental Normal   Cardiovascular:   Exercise tolerance: good Pacemaker Hypertension Valvular problems/Murmurs, MR Denies CAD.    Denies CABG/stent. Dysrhythmias atrial fibrillation CHF no  hyperlipidemia AICD   Pulmonary:   Denies COPD.  Denies Asthma.  Denies Sleep Apnea.    Renal/:   Chronic Renal Disease, ESRD renal calculi    Hepatic/GI:   Denies GERD.    Musculoskeletal:  Musculoskeletal Normal    Neurological:   Denies CVA.  Denies Headaches. Denies Seizures.    Endocrine:   Denies Diabetes.  Denies Obesity / BMI > 30  Dermatological:  Skin Normal    Psych:   Denies Psychiatric History.          Physical Exam  General: Well nourished, Cooperative, Alert, Oriented and Cachexia    Airway:  Mallampati: II / II  Mouth Opening: Normal  TM Distance: Normal  Tongue: Normal  Neck ROM: Normal ROM    Dental:  Intact        Anesthesia Plan  Type of Anesthesia, risks & benefits discussed:    Anesthesia Type: Regional, Gen Supraglottic Airway, Gen Natural Airway, MAC, Gen ETT  Intra-op Monitoring Plan: Standard ASA Monitors  Post Op Pain Control Plan: multimodal analgesia and IV/PO Opioids PRN  Induction:  IV  Airway Plan: Direct and Video, Post-Induction  Informed Consent: Informed consent signed with the Patient and all parties understand the risks and agree with anesthesia plan.  All questions answered. Patient consented to blood products? Yes  ASA Score: 4  Day of Surgery Review of History & Physical: H&P Update referred to the surgeon/provider.    Ready For Surgery From Anesthesia Perspective.     .

## 2023-05-01 NOTE — ANESTHESIA PROCEDURE NOTES
Left Supraclavicular Block    Patient location during procedure: OR    Reason for block: primary anesthetic    Diagnosis: ESRD   Start time: 5/1/2023 7:07 AM  Timeout: 5/1/2023 7:06 AM   End time: 5/1/2023 7:15 AM    Staffing  Authorizing Provider: Melchor Kee MD  Performing Provider: Jeyson Kent MD    Preanesthetic Checklist  Completed: patient identified, IV checked, site marked, risks and benefits discussed, surgical consent, monitors and equipment checked, pre-op evaluation and timeout performed  Peripheral Block  Patient position: supine  Prep: ChloraPrep  Patient monitoring: heart rate, cardiac monitor, continuous pulse ox, continuous capnometry and frequent blood pressure checks  Block type: supraclavicular  Laterality: left  Injection technique: single shot  Needle  Needle type: Stimuplex   Needle gauge: 22 G  Needle length: 2 in  Needle localization: anatomical landmarks and ultrasound guidance   -ultrasound image captured on disc.  Assessment  Injection assessment: negative aspiration, negative parasthesia and local visualized surrounding nerve  Paresthesia pain: none  Heart rate change: no  Slow fractionated injection: yes  Pain Tolerance: comfortable throughout block  Medications:    Medications: mepivacaine (CARBOCAINE) injection 15 mg/mL (1.5%) - Perineural   35 mL - 5/1/2023 7:14:00 AM    Additional Notes  Intercostal brachial field block performed with mepivacaine 1.5% 10ml for additional coverage.    VSS.  See anesthesia record.  Patient tolerated procedure well.

## 2023-05-01 NOTE — OP NOTE
Surgery Date: 5/1/2023      Surgeon(s) and Role:     * FARIDEH Muñoz III, MD - Primary     * Brenden Celeste MD - Fellow     Assisting Surgeon: None     Pre-op Diagnosis:  ESRD (end stage renal disease) on dialysis [N18.6, Z99.2]     Post-op Diagnosis:  Post-Op Diagnosis Codes:     * ESRD (end stage renal disease) on dialysis [N18.6, Z99.2]     Procedure(s) (LRB):  REVISION, AV FISTULA (Left)     Anesthesia: Regional     Operative Findings: Skin over counterincision excised and primarily closed     Estimated Blood Loss: <10cc         Specimens:   Specimen (24h ago, onward)        None      Indications: 56 yo F with functioning left AVG. She has been unable to heal a small area at the counter incision.      Procedure in Detail:   Patient was brought to the OR in supine position. Regional anesthesia was administered with block to the left arm. Left arm was prepped and draped in sterile fashion. Time out was performed. Pre-op antibiotics were administered.     An incision was made over where the counter incision is. A small ellipse of skin was excised with cautery and sharp dissection.The field was thoroughly irrigated with saline. Areas of bleeding were treated with electrocautery and the field was again irrigated and there was excellent hemostasis. The deep layer was closed with 3 - 0 vicryl sutures in an interrupted fashion. The skin was closed with interrupted 3-0 nylon sutures in a vertical mattress fashion.  The incision was dressed with 4x4 gauze and a tegaderm. The patient tolerated the procedure well and was transported to the PACU for recovery.     Estimated Blood loss: 5 ml     Complications: none     Brenden Celeste MD PGY7  Vascular Surgery Fellow  [phone]

## 2023-05-03 ENCOUNTER — TELEPHONE (OUTPATIENT)
Dept: VASCULAR SURGERY | Facility: CLINIC | Age: 58
End: 2023-05-03
Payer: COMMERCIAL

## 2023-05-03 DIAGNOSIS — Z71.89 COMPLEX CARE COORDINATION: ICD-10-CM

## 2023-05-03 NOTE — TELEPHONE ENCOUNTER
----- Message from FARIDEH Muñoz III, MD sent at 5/3/2023  3:11 PM CDT -----  Yes, can be used  ----- Message -----  From: Adriana De La Rosa RN  Sent: 5/3/2023  10:47 AM CDT  To: FARIDEH Muñoz III, MD    Her  says he thinks you mentioned to him that she can use the graft for HD tomorrow but he can't remember for sure. Can you confirm?  ----- Message -----  From: FARIDEH Muñoz III, MD  Sent: 5/1/2023   7:48 AM CDT  To: Adriana De La Rosa RN    F/u 3 weeks for suture removal, no studies

## 2023-05-03 NOTE — TELEPHONE ENCOUNTER
Contacted pt and   Otis with Dr. Muñoz's response. Both verbalized understanding and pt states she would like nurse to notify nurse at HD center so that area with sutures can be avoided. Nurse contacted nurse who states she will note this in pt's communication log for treatment tomorrow. ----- Message from FARIDEH Muñoz III, MD sent at 5/3/2023  3:11 PM CDT -----  Yes, can be used  ----- Message -----  From: Adriana De La Rosa RN  Sent: 5/3/2023  10:47 AM CDT  To: FARIDEH Muñoz III, MD    Her  says he thinks you mentioned to him that she can use the graft for HD tomorrow but he can't remember for sure. Can you confirm?  ----- Message -----  From: FARIDEH Muñoz III, MD  Sent: 5/1/2023   7:48 AM CDT  To: Adriana De La Rosa RN    F/u 3 weeks for suture removal, no studies

## 2023-05-03 NOTE — TELEPHONE ENCOUNTER
Contacted pt's  to confirm appt with Dr. Muñoz on Friday 5/12/23.----- Message from FARIDEH Muñoz III, MD sent at 5/1/2023  7:48 AM CDT -----  F/u 3 weeks for suture removal, no studies

## 2023-05-05 ENCOUNTER — OFFICE VISIT (OUTPATIENT)
Dept: CARDIOLOGY | Facility: CLINIC | Age: 58
End: 2023-05-05
Payer: MEDICARE

## 2023-05-05 ENCOUNTER — OFFICE VISIT (OUTPATIENT)
Dept: OPTOMETRY | Facility: CLINIC | Age: 58
End: 2023-05-05
Payer: MEDICARE

## 2023-05-05 VITALS
WEIGHT: 104.06 LBS | HEIGHT: 64 IN | SYSTOLIC BLOOD PRESSURE: 126 MMHG | DIASTOLIC BLOOD PRESSURE: 68 MMHG | BODY MASS INDEX: 17.77 KG/M2 | HEART RATE: 70 BPM | OXYGEN SATURATION: 99 %

## 2023-05-05 DIAGNOSIS — N18.6 ESRD (END STAGE RENAL DISEASE) ON DIALYSIS: ICD-10-CM

## 2023-05-05 DIAGNOSIS — I10 ESSENTIAL HYPERTENSION: ICD-10-CM

## 2023-05-05 DIAGNOSIS — N18.9 CHRONIC KIDNEY DISEASE-MINERAL AND BONE DISORDER: ICD-10-CM

## 2023-05-05 DIAGNOSIS — I50.22 CHRONIC SYSTOLIC CONGESTIVE HEART FAILURE: ICD-10-CM

## 2023-05-05 DIAGNOSIS — M89.9 CHRONIC KIDNEY DISEASE-MINERAL AND BONE DISORDER: ICD-10-CM

## 2023-05-05 DIAGNOSIS — I27.20 PULMONARY HYPERTENSION: ICD-10-CM

## 2023-05-05 DIAGNOSIS — D63.1 ANEMIA IN ESRD (END-STAGE RENAL DISEASE): ICD-10-CM

## 2023-05-05 DIAGNOSIS — H25.13 NUCLEAR SCLEROSIS OF BOTH EYES: ICD-10-CM

## 2023-05-05 DIAGNOSIS — Z99.2 ESRD (END STAGE RENAL DISEASE) ON DIALYSIS: ICD-10-CM

## 2023-05-05 DIAGNOSIS — I34.0 NONRHEUMATIC MITRAL VALVE REGURGITATION: ICD-10-CM

## 2023-05-05 DIAGNOSIS — H52.4 PRESBYOPIA: ICD-10-CM

## 2023-05-05 DIAGNOSIS — N18.6 ANEMIA IN ESRD (END-STAGE RENAL DISEASE): ICD-10-CM

## 2023-05-05 DIAGNOSIS — Z95.810 ICD (IMPLANTABLE CARDIOVERTER-DEFIBRILLATOR) IN PLACE: ICD-10-CM

## 2023-05-05 DIAGNOSIS — I48.0 PAF (PAROXYSMAL ATRIAL FIBRILLATION): ICD-10-CM

## 2023-05-05 DIAGNOSIS — Z90.5 S/P NEPHRECTOMY: Chronic | ICD-10-CM

## 2023-05-05 DIAGNOSIS — I50.22 CHRONIC SYSTOLIC CONGESTIVE HEART FAILURE, NYHA CLASS 3: Primary | ICD-10-CM

## 2023-05-05 DIAGNOSIS — I50.42 CHRONIC COMBINED SYSTOLIC AND DIASTOLIC CONGESTIVE HEART FAILURE: ICD-10-CM

## 2023-05-05 DIAGNOSIS — I42.8 CARDIOMYOPATHY, NONISCHEMIC: ICD-10-CM

## 2023-05-05 DIAGNOSIS — I10 PRIMARY HYPERTENSION: Primary | ICD-10-CM

## 2023-05-05 DIAGNOSIS — E83.9 CHRONIC KIDNEY DISEASE-MINERAL AND BONE DISORDER: ICD-10-CM

## 2023-05-05 PROCEDURE — 99212 PR OFFICE/OUTPT VISIT, EST, LEVL II, 10-19 MIN: ICD-10-PCS | Mod: S$PBB,,, | Performed by: INTERNAL MEDICINE

## 2023-05-05 PROCEDURE — 99213 OFFICE O/P EST LOW 20 MIN: CPT | Mod: PBBFAC,27,PO | Performed by: OPTOMETRIST

## 2023-05-05 PROCEDURE — 92014 COMPRE OPH EXAM EST PT 1/>: CPT | Mod: S$PBB,,, | Performed by: OPTOMETRIST

## 2023-05-05 PROCEDURE — 99999 PR PBB SHADOW E&M-EST. PATIENT-LVL V: ICD-10-PCS | Mod: PBBFAC,,, | Performed by: INTERNAL MEDICINE

## 2023-05-05 PROCEDURE — 99999 PR PBB SHADOW E&M-EST. PATIENT-LVL III: ICD-10-PCS | Mod: PBBFAC,,, | Performed by: OPTOMETRIST

## 2023-05-05 PROCEDURE — 92014 PR EYE EXAM, EST PATIENT,COMPREHESV: ICD-10-PCS | Mod: S$PBB,,, | Performed by: OPTOMETRIST

## 2023-05-05 PROCEDURE — 99215 OFFICE O/P EST HI 40 MIN: CPT | Mod: PBBFAC | Performed by: INTERNAL MEDICINE

## 2023-05-05 PROCEDURE — 99212 OFFICE O/P EST SF 10 MIN: CPT | Mod: S$PBB,,, | Performed by: INTERNAL MEDICINE

## 2023-05-05 PROCEDURE — 99999 PR PBB SHADOW E&M-EST. PATIENT-LVL III: CPT | Mod: PBBFAC,,, | Performed by: OPTOMETRIST

## 2023-05-05 PROCEDURE — 99999 PR PBB SHADOW E&M-EST. PATIENT-LVL V: CPT | Mod: PBBFAC,,, | Performed by: INTERNAL MEDICINE

## 2023-05-05 NOTE — PROGRESS NOTES
Subjective:   Patient ID:  Eva Bloom is a 57 y.o. female who presents for follow-up of Atrial Fibrillation  Eva Bloom is a 56 y.o. female who presents for follow-up of Atrial fibrillation with RVR (Hospital f/u 6/28/22)  Eva Bloom is a 56 y.o. female who presents for follow-up of Fatigue and Cardiomyopathy (4 month f/u )     Nonischemic cardiomyopathy   HFrEF s/p SC-ICD  Chronic pericardial effusion   Hypertension  Pulmonary hypertension  Renal cell carcinoma  ESRD on HD     Interval History  Presents with CP x 3-4 days. States it is a light pain and that comes and goes randomly and lasts a few seconds at a time. Nonexertional. She also reports LUE swelling. She has AVF in LUE. Also mild LE edema which is chronic. She denies SOB, syncope, near syncope, sustained palpitations. She had a nosebleed recently. Packing was removed about a week ago, no further bleeding. BP is typically 130s/70s, sometimes higher, at home. Of note, she was switched from Olmesartan to Entresto following her last visit. The Entresto caused diarrhea so she stopped it.      1/2021 HPI (Sowmya Stock)  Ms. Bloom is in clinic today for routine follow up.  At her last visit her, her metoprolol was increased to 100 mg daily and imdur was changed to isordil 30 mg BID to match components of bydil.  Denies hypotension during HD.  Her BP is running 130-140s systolic.  Patient denies chest pain with exertion or at rest, palpitations, SOB, WARREN, dizziness, syncope, edema, orthopnea, PND, or claudication.  Reports no routine exercise due to fatigue.        Echo 2/21  Mild concentric hypertrophy and moderately decreased systolic function. The estimated ejection fraction is 30%  Grade II left ventricular diastolic dysfunction.  Moderate mitral regurgitation.  Mild right ventricular enlargement with normal right ventricular systolic function.  Moderate to severe tricuspid regurgitation.  Mild aortic regurgitation.  Severe left atrial  enlargement.  Moderate right atrial enlargement.  Intermediate central venous pressure (8 mmHg).  The estimated PA systolic pressure is 72 mmHg.  There is pulmonary hypertension.  Moderate posterior pericardial effusion.     PET 2017  CONCLUSIONS: NORMAL MYOCARDIAL PERFUSION PET STRESS TEST   1. The perfusion scan is free of evidence for myocardial ischemia or injury.   2. There is abnormal wall motion at rest showing mild global hypokinesis of the left ventricle. There is abnormal wall motion at stress showing mild global hypokinesis of the left ventricle.   3. There is resting LV dysfunction with a reduced ejection fraction of 41 %. There is stress induced LV dysfunction with a reduced ejection fraction of 48 %.  (normal is >= 51%)   4. The ventricular volumes are normal at rest and stress.   5. The extracardiac distribution of radioactivity is normal.   6. There was no previous study available to compare.       HPI:   patent feels weak with limited activity  No chest pain, Orthopnea, PND of heart failure symptoms.   Denies palpitations or fluttering in the chest  BP  Has been consistently elevated in the clinic but her pressures have been better at home and in the dialysis clinic per her.      HPI:   patent feels weak with limited activity  No chest pain, Orthopnea, PND of heart failure symptoms.   Denies palpitations or fluttering in the chest  BP  Has been consistently elevated in the clinic but her pressures have been better at home and in the dialysis clinic per her.   Patient is sedentary and walks some.       Echo 2022  The left ventricle is mildly enlarged with moderate concentric hypertrophy and severely decreased systolic function.  The estimated ejection fraction is 25%.  Grade II left ventricular diastolic dysfunction.  Normal right ventricular size with normal right ventricular systolic function.  Moderate left atrial enlargement.  Moderate right atrial enlargement.  There is mild aortic valve  "stenosis.  Aortic valve area is 2.11 cm2; peak velocity is 2.11 m/s; mean gradient is 10 mmHg.  Moderate mitral regurgitation.  Moderate tricuspid regurgitation.  Elevated central venous pressure (15 mmHg).  The estimated PA systolic pressure is 60 mmHg.  There is pulmonary hypertension.  Small to moderate circumferential pericardial effusion. No tamponade       HPI:   WARREN on moderate actvity  NYHA IV  No chest pain, Orthopnea, PND of heart failure symptoms.   Denies palpitations or fluttering in the chest  Minimal weight gain      Patient Active Problem List   Diagnosis    Kidney stones    Hyperparathyroidism, secondary renal    Hyperuricemia without signs of inflammatory arthritis and tophaceous disease    Proteinuria    Urate nephropathy    Essential hypertension    Pap smear abnormality of cervix with ASCUS favoring benign    CMV (cytomegalovirus) antibody positive    Herpes simplex type 1 antibody positive    H/O herpes simplex type 2 infection    Multinodular goiter    History of kidney cancer    S/p nephrectomy left    Stenosis of arteriovenous dialysis fistula    Primary insomnia    Cardiomyopathy, nonischemic    Pulmonary hypertension    ESRD (end stage renal disease) on dialysis    Nonrheumatic mitral valve regurgitation    Chronic combined systolic and diastolic congestive heart failure    Anemia in ESRD (end-stage renal disease)    Chronic kidney disease-mineral and bone disorder    Hyperphosphatemia    ICD (implantable cardioverter-defibrillator) in place - SubQ    Right-sided epistaxis    Left-sided epistaxis    Hypercalcemia    Glucose intolerance    PAF (paroxysmal atrial fibrillation)    Transaminitis    Debility    Malfunction of arteriovenous dialysis fistula    Clostridium difficile infection    Arteriovenous graft infection     /68   Pulse 70   Ht 5' 4" (1.626 m)   Wt 47.2 kg (104 lb 0.9 oz)   LMP 10/24/2016   SpO2 99%   BMI 17.86 kg/m²   Body mass index is 17.86 kg/m².  CrCl cannot " be calculated (Patient's most recent lab result is older than the maximum 7 days allowed.).    Lab Results   Component Value Date     03/27/2023    K 3.6 03/27/2023     03/27/2023    CO2 24 03/27/2023    BUN 46 (H) 03/27/2023    CREATININE 5.7 (H) 03/27/2023    GLU 93 03/27/2023    HGBA1C 4.8 10/22/2022    MG 2.1 01/30/2023    AST 31 03/27/2023    ALT 11 03/27/2023    ALBUMIN 2.4 (L) 03/27/2023    PROT 6.6 03/27/2023    BILITOT 0.5 03/27/2023    WBC 5.69 03/27/2023    HGB 10.9 (L) 03/27/2023    HCT 37.2 03/27/2023     (H) 03/27/2023     03/27/2023    INR 1.1 11/16/2022    INR 1.1 10/22/2021    TSH 19.710 (H) 03/27/2023    CHOL 101 (L) 10/22/2022    HDL 35 (L) 10/22/2022    LDLCALC 46.6 (L) 10/22/2022    TRIG 97 10/22/2022       Current Outpatient Medications   Medication Sig    acetaminophen (TYLENOL) 500 MG tablet Take 500 mg by mouth every 6 (six) hours as needed for Pain.    apixaban (ELIQUIS) 2.5 mg Tab Take 1 tablet (2.5 mg total) by mouth 2 (two) times daily.    cabozantinib (CABOMETYX) 60 mg Tab TAKE ONE TABLET BY MOUTH ONCE DAILY AT THE SAME TIME ON AN EMPTY STOMACH AT LEAST 1 HOUR BEFORE OR 2 HOURS AFTER EATING. AVOID GRAPEFRUIT PRODUCTS    cloNIDine 0.3 mg/24 hr td ptwk (CATAPRES) 0.3 mg/24 hr Place 1 patch onto the skin every 7 days.    gabapentin (NEURONTIN) 100 MG capsule Take 1 capsule (100 mg total) by mouth once daily.    HYDROcodone-acetaminophen (NORCO) 5-325 mg per tablet Take 1 tablet by mouth every 4 (four) hours as needed for Pain. Causes drowsiness. Do not drive or operate machinery with this medication. Do not drink alcohol with this medication. Causes constipation. Take with stool softener.    HYDROcodone-acetaminophen (NORCO) 5-325 mg per tablet Take 1 tablet by mouth every 6 (six) hours as needed for Pain.    levothyroxine (SYNTHROID) 75 MCG tablet Take 1 tablet (75 mcg total) by mouth once daily.    lidocaine-prilocaine (EMLA) cream APPLY ATLEAST 30 MINUTES  BEFORE TREATMENT 3 TIMES A WEEK    metoprolol succinate (TOPROL-XL) 100 MG 24 hr tablet Take 1/2 tablet (50mg) once daily    mv,Ca,min-folic acid-vit K1 (ONE-A-DAY WOMEN'S 50 PLUS) 400-20 mcg Tab Take 1 tablet by mouth once daily.    naloxone (NARCAN) 1 mg/mL injection 2 mg (1 mg per nostril) by Nasal route as needed for opioid overdose; may repeat in 3 to 5 minutes if not effective. Call 911    oxyCODONE (OXY-IR) 5 mg Cap Take 1 capsule (5 mg total) by mouth every 6 (six) hours as needed for Pain.    polyethylene glycol (GLYCOLAX) 17 gram/dose powder Take 17 g by mouth once daily.    promethazine (PHENERGAN) 25 MG tablet Take 1 tablet (25 mg total) by mouth every 6 (six) hours as needed for Nausea.    sacubitriL-valsartan (ENTRESTO) 49-51 mg per tablet Take 1 tablet by mouth 2 (two) times daily.    hydrALAZINE (APRESOLINE) 100 MG tablet Take 1 tablet (100 mg total) by mouth every 12 (twelve) hours.     No current facility-administered medications for this visit.     Facility-Administered Medications Ordered in Other Visits   Medication    0.9%  NaCl infusion       Review of Systems   Constitutional: Negative for chills, decreased appetite, malaise/fatigue, night sweats, weight gain and weight loss.   Eyes:  Negative for blurred vision, double vision, visual disturbance and visual halos.   Cardiovascular:  Negative for chest pain, claudication, cyanosis, dyspnea on exertion, irregular heartbeat, leg swelling, near-syncope, orthopnea, palpitations, paroxysmal nocturnal dyspnea and syncope.   Respiratory:  Negative for cough, hemoptysis, snoring, sputum production and wheezing.    Endocrine: Negative for cold intolerance, heat intolerance, polydipsia and polyphagia.   Hematologic/Lymphatic: Negative for adenopathy and bleeding problem. Does not bruise/bleed easily.   Skin:  Negative for flushing, itching, poor wound healing and rash.   Musculoskeletal:  Negative for arthritis, back pain, falls, gout, joint pain, joint  swelling, muscle cramps, muscle weakness, myalgias, neck pain and stiffness.   Gastrointestinal:  Negative for bloating, abdominal pain, anorexia, diarrhea, dysphagia, excessive appetite, flatus, hematemesis, jaundice, melena and nausea.   Genitourinary:  Negative for hesitancy and incomplete emptying.   Neurological:  Negative for aphonia, brief paralysis, difficulty with concentration, disturbances in coordination, excessive daytime sleepiness, dizziness, focal weakness, light-headedness, loss of balance and weakness.   Psychiatric/Behavioral:  Negative for altered mental status, depression, hallucinations, hypervigilance, memory loss, substance abuse and suicidal ideas. The patient does not have insomnia and is not nervous/anxious.      Objective:   Physical Exam  Vitals reviewed: patient is in a wheel chair.   Constitutional:       General: She is not in acute distress.     Appearance: She is well-developed. She is not diaphoretic.   HENT:      Head: Normocephalic and atraumatic.      Nose: Nose normal.      Mouth/Throat:      Pharynx: No oropharyngeal exudate.   Eyes:      General: No scleral icterus.        Right eye: No discharge.         Left eye: No discharge.      Conjunctiva/sclera: Conjunctivae normal.      Pupils: Pupils are equal, round, and reactive to light.   Neck:      Thyroid: No thyromegaly.      Vascular: No JVD.      Trachea: No tracheal deviation.   Cardiovascular:      Rate and Rhythm: Normal rate and regular rhythm.      Pulses: Intact distal pulses.      Heart sounds: Normal heart sounds. No murmur heard.    No friction rub. No gallop.   Pulmonary:      Effort: Pulmonary effort is normal. No respiratory distress.      Breath sounds: Normal breath sounds. No stridor. No wheezing or rales.   Chest:      Chest wall: No tenderness.   Abdominal:      General: Bowel sounds are normal. There is no distension.      Palpations: Abdomen is soft. There is no mass.      Tenderness: There is no  abdominal tenderness. There is no guarding or rebound.   Musculoskeletal:         General: No tenderness. Normal range of motion.      Cervical back: Normal range of motion and neck supple.   Lymphadenopathy:      Cervical: No cervical adenopathy.   Skin:     General: Skin is warm.      Coloration: Skin is not pale.      Findings: No erythema or rash.   Neurological:      Mental Status: She is alert and oriented to person, place, and time.      Cranial Nerves: No cranial nerve deficit.      Motor: No abnormal muscle tone.      Coordination: Coordination normal.      Deep Tendon Reflexes: Reflexes are normal and symmetric. Reflexes normal.   Psychiatric:         Behavior: Behavior normal.         Thought Content: Thought content normal.         Judgment: Judgment normal.       Assessment:     1. Chronic systolic congestive heart failure, NYHA class 3    2. Chronic systolic congestive heart failure    3. Pulmonary hypertension    4. Cardiomyopathy, nonischemic    5. Chronic combined systolic and diastolic congestive heart failure    6. Essential hypertension    7. ICD (implantable cardioverter-defibrillator) in place - SubQ    8. PAF (paroxysmal atrial fibrillation)    9. Nonrheumatic mitral valve regurgitation    10. ESRD (end stage renal disease) on dialysis    11. S/p nephrectomy left    12. Chronic kidney disease-mineral and bone disorder    13. Anemia in ESRD (end-stage renal disease)        Plan:   Eva was seen today for atrial fibrillation.    Diagnoses and all orders for this visit:    Chronic systolic congestive heart failure, NYHA class 3    Chronic systolic congestive heart failure  -     Ambulatory referral/consult to Cardiology    Pulmonary hypertension    Cardiomyopathy, nonischemic    Chronic combined systolic and diastolic congestive heart failure    Essential hypertension    ICD (implantable cardioverter-defibrillator) in place - SubQ    PAF (paroxysmal atrial fibrillation)    Nonrheumatic mitral  valve regurgitation    ESRD (end stage renal disease) on dialysis    S/p nephrectomy left    Chronic kidney disease-mineral and bone disorder    Anemia in ESRD (end-stage renal disease)      RTC 3 months, low dose for increasing entersto  Low salt diet and watch weight call if there is 3-5 pounds weight gain in 1 wk.   Limit sodium intake to less then 2 gram sodium and 1500cc fluid restriction.  Graded exercise program as tolerated.  Call if  more than 3 lbs in 1 day or 5 lbs in 1 week.

## 2023-05-05 NOTE — PROGRESS NOTES
Subjective:       Patient ID: Eva Bloom is a 57 y.o. female      Chief Complaint   Patient presents with    Concerns About Ocular Health    Hypertensive Eye Exam     History of Present Illness  Dls: 2/25/22 Dr. Larsen     58 y/o female presents today for hypertensive eye exam.   Pt c/o blurry vision at distance and near ou.   Pt wears single vision glasses for reading.     No tearing  + ou itching  No burning  No pain  No ha's  No floaters  No flashes    Eye meds  None      Assessment/Plan:     1. Primary hypertension  No hypertensive retinopathy. Continue BP control. RTC 1 year for DFE.    2. Nuclear sclerosis of both eyes  NVS. Monitor.    3. Presbyopia  Pt doing well with current Rx. Declines update today.     Follow up in about 1 year (around 5/5/2024).

## 2023-05-06 PROCEDURE — G0179 MD RECERTIFICATION HHA PT: HCPCS | Mod: ,,, | Performed by: SURGERY

## 2023-05-06 PROCEDURE — G0179 PR HOME HEALTH MD RECERTIFICATION: ICD-10-PCS | Mod: ,,, | Performed by: SURGERY

## 2023-05-08 ENCOUNTER — HOSPITAL ENCOUNTER (OUTPATIENT)
Dept: CARDIOLOGY | Facility: HOSPITAL | Age: 58
Discharge: HOME | End: 2023-05-08
Attending: INTERNAL MEDICINE
Payer: MEDICARE

## 2023-05-08 VITALS
HEIGHT: 64 IN | HEART RATE: 68 BPM | SYSTOLIC BLOOD PRESSURE: 150 MMHG | DIASTOLIC BLOOD PRESSURE: 80 MMHG | BODY MASS INDEX: 17.75 KG/M2 | WEIGHT: 104 LBS

## 2023-05-08 DIAGNOSIS — I50.22 CHRONIC SYSTOLIC CONGESTIVE HEART FAILURE: ICD-10-CM

## 2023-05-08 DIAGNOSIS — E03.9 HYPOTHYROIDISM, UNSPECIFIED TYPE: Primary | ICD-10-CM

## 2023-05-08 DIAGNOSIS — C64.9 METASTATIC RENAL CELL CARCINOMA, UNSPECIFIED LATERALITY: ICD-10-CM

## 2023-05-08 LAB
ASCENDING AORTA: 2.86 CM
AV INDEX (PROSTH): 0.42
AV MEAN GRADIENT: 8 MMHG
AV PEAK GRADIENT: 16 MMHG
AV VALVE AREA: 1.58 CM2
AV VELOCITY RATIO: 0.38
BSA FOR ECHO PROCEDURE: 1.46 M2
CV ECHO LV RWT: 0.34 CM
DOP CALC AO PEAK VEL: 2.01 M/S
DOP CALC AO VTI: 32.09 CM
DOP CALC LVOT AREA: 3.8 CM2
DOP CALC LVOT DIAMETER: 2.2 CM
DOP CALC LVOT PEAK VEL: 0.76 M/S
DOP CALC LVOT STROKE VOLUME: 50.76 CM3
DOP CALCLVOT PEAK VEL VTI: 13.36 CM
E WAVE DECELERATION TIME: 174.27 MSEC
E/A RATIO: 1.18
E/E' RATIO: 17.86 M/S
ECHO LV POSTERIOR WALL: 0.93 CM (ref 0.6–1.1)
EJECTION FRACTION: 23 %
FRACTIONAL SHORTENING: 12 % (ref 28–44)
INTERVENTRICULAR SEPTUM: 0.82 CM (ref 0.6–1.1)
LA MAJOR: 5.35 CM
LA MINOR: 4.46 CM
LA WIDTH: 4.15 CM
LEFT ATRIUM SIZE: 4 CM
LEFT ATRIUM VOLUME INDEX MOD: 39.8 ML/M2
LEFT ATRIUM VOLUME INDEX: 46.4 ML/M2
LEFT ATRIUM VOLUME MOD: 58.9 CM3
LEFT ATRIUM VOLUME: 68.64 CM3
LEFT INTERNAL DIMENSION IN SYSTOLE: 4.88 CM (ref 2.1–4)
LEFT VENTRICLE DIASTOLIC VOLUME INDEX: 101.18 ML/M2
LEFT VENTRICLE DIASTOLIC VOLUME: 149.75 ML
LEFT VENTRICLE MASS INDEX: 123 G/M2
LEFT VENTRICLE SYSTOLIC VOLUME INDEX: 75.7 ML/M2
LEFT VENTRICLE SYSTOLIC VOLUME: 111.99 ML
LEFT VENTRICULAR INTERNAL DIMENSION IN DIASTOLE: 5.54 CM (ref 3.5–6)
LEFT VENTRICULAR MASS: 181.47 G
LV LATERAL E/E' RATIO: 13.89 M/S
LV SEPTAL E/E' RATIO: 25 M/S
MV PEAK A VEL: 1.06 M/S
MV PEAK E VEL: 1.25 M/S
PISA MRMAX VEL: 0.05 M/S
PISA TR MAX VEL: 2.81 M/S
RA MAJOR: 5.5 CM
RA PRESSURE: 15 MMHG
RA WIDTH: 4.59 CM
RIGHT VENTRICULAR END-DIASTOLIC DIMENSION: 5.55 CM
SINUS: 2.76 CM
STJ: 2.41 CM
TDI LATERAL: 0.09 M/S
TDI SEPTAL: 0.05 M/S
TDI: 0.07 M/S
TR MAX PG: 32 MMHG
TRICUSPID ANNULAR PLANE SYSTOLIC EXCURSION: 2.22 CM
TV REST PULMONARY ARTERY PRESSURE: 47 MMHG

## 2023-05-08 PROCEDURE — 93306 TTE W/DOPPLER COMPLETE: CPT | Mod: 26,,, | Performed by: INTERNAL MEDICINE

## 2023-05-08 PROCEDURE — 93306 TTE W/DOPPLER COMPLETE: CPT

## 2023-05-08 PROCEDURE — 93306 ECHO (CUPID ONLY): ICD-10-PCS | Mod: 26,,, | Performed by: INTERNAL MEDICINE

## 2023-05-10 ENCOUNTER — OFFICE VISIT (OUTPATIENT)
Dept: HEMATOLOGY/ONCOLOGY | Facility: CLINIC | Age: 58
End: 2023-05-10
Payer: MEDICARE

## 2023-05-10 VITALS
RESPIRATION RATE: 16 BRPM | WEIGHT: 96.13 LBS | OXYGEN SATURATION: 97 % | HEIGHT: 64 IN | TEMPERATURE: 99 F | HEART RATE: 100 BPM | BODY MASS INDEX: 16.41 KG/M2 | DIASTOLIC BLOOD PRESSURE: 85 MMHG | SYSTOLIC BLOOD PRESSURE: 125 MMHG

## 2023-05-10 DIAGNOSIS — R93.89 ABNORMAL FINDINGS ON DIAGNOSTIC IMAGING OF OTHER SPECIFIED BODY STRUCTURES: ICD-10-CM

## 2023-05-10 DIAGNOSIS — C64.9 METASTATIC RENAL CELL CARCINOMA, UNSPECIFIED LATERALITY: Primary | ICD-10-CM

## 2023-05-10 PROCEDURE — 99999 PR PBB SHADOW E&M-EST. PATIENT-LVL IV: CPT | Mod: PBBFAC,,, | Performed by: INTERNAL MEDICINE

## 2023-05-10 PROCEDURE — 99214 OFFICE O/P EST MOD 30 MIN: CPT | Mod: PBBFAC | Performed by: INTERNAL MEDICINE

## 2023-05-10 PROCEDURE — 99215 PR OFFICE/OUTPT VISIT, EST, LEVL V, 40-54 MIN: ICD-10-PCS | Mod: S$PBB,,, | Performed by: INTERNAL MEDICINE

## 2023-05-10 PROCEDURE — 99215 OFFICE O/P EST HI 40 MIN: CPT | Mod: S$PBB,,, | Performed by: INTERNAL MEDICINE

## 2023-05-10 PROCEDURE — 99999 PR PBB SHADOW E&M-EST. PATIENT-LVL IV: ICD-10-PCS | Mod: PBBFAC,,, | Performed by: INTERNAL MEDICINE

## 2023-05-10 NOTE — PROGRESS NOTES
"  Subjective:       Patient ID: Eva Bloom is a 57 y.o. female.    Chief Complaint: RCC    HPI     57 y.o.female, with metastatic RCC, here for f/u.  Recent HD graft revision.       Pt is currently taking cabozantinib daily since December 2016. Pt is on ESRD with HD every TTS. Patient tolerating treatment extremely well with little to no side effects.     Has had some weight loss.     Today, she denies any mouth sores, nausea, vomiting, diarrhea, constipation, chest pain, leg swelling, headache, dizziness, or mood changes.    ECOG 1. She is accompanied with her .    Oncologic History (From Chart and Patient):  Eva Bloom is a 57 y.o.female with ESRD on HD who was found to have two L renal masses. She underwent L lap nephrectomy on 1/12/16. 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 Margins were negative.  Shehealed from surgery well, but then developed a large ovarian mass.  This was removed by gyn along with multiple sites of metastatic disease in the pelvis.  Path was c/w recurrence of RCC.     11/20/16 Pelvic ultrasound reveals "Large heterogeneous cystic and solid mass which appears to arise from the right ovary with worrisome imaging features for malignancy.  Differential diagnosis includes malignant tumors such as serous or mucinous cystadenocarcinoma.  It is important to note that the patient is at risk for ovarian torsion due to the size of the mass.Multiple uterine fibroids are identified with the largest in the uterine fundus."    11/22/16 pathology reveals "FINAL PATHOLOGIC DIAGNOSIS-RIGHT OVARY AND FALLOPIAN TUBE, RIGHT SALPINGO-OOPHORECTOMY:  -Positive for malignancy, high grade carcinoma morphologically and immunohistochemically consistent with metastasis from the patient's known renal cell carcinoma"    Review of Systems   Constitutional:  Positive for fatigue. " Negative for activity change, appetite change, chills and fever.   HENT:  Negative for congestion, hearing loss, mouth sores, postnasal drip, sore throat, tinnitus and voice change.    Eyes:  Negative for pain and visual disturbance.   Respiratory:  Negative for cough, shortness of breath and wheezing.    Cardiovascular:  Negative for chest pain, palpitations and leg swelling.   Gastrointestinal:  Negative for abdominal pain, constipation, diarrhea, nausea and vomiting.   Endocrine: Negative for cold intolerance and heat intolerance.   Genitourinary:  Negative for difficulty urinating, dyspareunia, dysuria, frequency, menstrual problem, urgency, vaginal bleeding, vaginal discharge and vaginal pain.   Musculoskeletal:  Negative for arthralgias and myalgias.   Skin:  Negative for color change, rash and wound.   Allergic/Immunologic: Negative for environmental allergies and food allergies.   Neurological:  Negative for weakness, numbness and headaches.   Hematological:  Negative for adenopathy. Does not bruise/bleed easily.   Psychiatric/Behavioral:  Negative for agitation, confusion, hallucinations and sleep disturbance. The patient is not nervous/anxious.    All other systems reviewed and are negative.    Allergies:  Review of patient's allergies indicates:   Allergen Reactions    Allopurinol      Other reaction(s): abnormal transaminases       Medications:  Current Outpatient Medications   Medication Sig Dispense Refill    acetaminophen (TYLENOL) 500 MG tablet Take 500 mg by mouth every 6 (six) hours as needed for Pain.      apixaban (ELIQUIS) 2.5 mg Tab Take 1 tablet (2.5 mg total) by mouth 2 (two) times daily. 60 tablet 11    cabozantinib (CABOMETYX) 60 mg Tab TAKE ONE TABLET BY MOUTH ONCE DAILY AT THE SAME TIME ON AN EMPTY STOMACH AT LEAST 1 HOUR BEFORE OR 2 HOURS AFTER EATING. AVOID GRAPEFRUIT PRODUCTS 30 tablet 4    cloNIDine 0.3 mg/24 hr td ptwk (CATAPRES) 0.3 mg/24 hr Place 1 patch onto the skin every 7 days.  4 patch 11    gabapentin (NEURONTIN) 100 MG capsule Take 1 capsule (100 mg total) by mouth once daily. 30 capsule 11    hydrALAZINE (APRESOLINE) 100 MG tablet Take 1 tablet (100 mg total) by mouth every 12 (twelve) hours. 180 tablet 2    HYDROcodone-acetaminophen (NORCO) 5-325 mg per tablet Take 1 tablet by mouth every 4 (four) hours as needed for Pain. Causes drowsiness. Do not drive or operate machinery with this medication. Do not drink alcohol with this medication. Causes constipation. Take with stool softener. 10 tablet 0    HYDROcodone-acetaminophen (NORCO) 5-325 mg per tablet Take 1 tablet by mouth every 6 (six) hours as needed for Pain. 15 tablet 0    levothyroxine (SYNTHROID) 75 MCG tablet Take 1 tablet (75 mcg total) by mouth once daily. 30 tablet 11    lidocaine-prilocaine (EMLA) cream APPLY ATLEAST 30 MINUTES BEFORE TREATMENT 3 TIMES A WEEK 30 g 11    metoprolol succinate (TOPROL-XL) 100 MG 24 hr tablet Take 1/2 tablet (50mg) once daily 90 tablet 3    mv,Ca,min-folic acid-vit K1 (ONE-A-DAY WOMEN'S 50 PLUS) 400-20 mcg Tab Take 1 tablet by mouth once daily.      naloxone (NARCAN) 1 mg/mL injection 2 mg (1 mg per nostril) by Nasal route as needed for opioid overdose; may repeat in 3 to 5 minutes if not effective. Call 911 2 mL 11    oxyCODONE (OXY-IR) 5 mg Cap Take 1 capsule (5 mg total) by mouth every 6 (six) hours as needed for Pain. 12 capsule 0    polyethylene glycol (GLYCOLAX) 17 gram/dose powder Take 17 g by mouth once daily. 235 g 0    promethazine (PHENERGAN) 25 MG tablet Take 1 tablet (25 mg total) by mouth every 6 (six) hours as needed for Nausea. 15 tablet 0    sacubitriL-valsartan (ENTRESTO) 49-51 mg per tablet Take 1 tablet by mouth 2 (two) times daily. 60 tablet 11     No current facility-administered medications for this visit.     Facility-Administered Medications Ordered in Other Visits   Medication Dose Route Frequency Provider Last Rate Last Admin    0.9%  NaCl infusion   Intravenous  Continuous Soni Bhatti MD           PMH:  Past Medical History:   Diagnosis Date    Anemia     Bronchitis 03/01/2017    Cancer 2016    kidney cancer    CHF (congestive heart failure), NYHA class II, chronic, systolic     CMV (cytomegalovirus) antibody positive     ESRD (end stage renal disease)     Essential hypertension 09/23/2015    H/O herpes simplex type 2 infection     Herpes simplex type 1 antibody positive     History of kidney cancer     s/p left nephrectomy 1/2016    Hyperparathyroidism, unspecified     Hyperuricemia without signs of inflammatory arthritis and tophaceous disease     Kidney stones     LGSIL (low grade squamous intraepithelial dysplasia)     Myocardiopathy 07/21/2017    Prediabetes     Proteinuria     Renal disorder     Thyroid nodule     Urate nephropathy        PSH:  Past Surgical History:   Procedure Laterality Date    BREAST CYST EXCISION      COLONOSCOPY N/A 11/12/2015    COLONOSCOPY N/A 3/12/2021    Procedure: COLONOSCOPY;  Surgeon: Brendon Lanier MD;  Location: [facility] ENDO;  Service: Endoscopy;  Laterality: N/A;  covid test 3/9, labs prior, prep instr mailed -ml    INSERTION OF BIVENTRICULAR IMPLANTABLE CARDIOVERTER-DEFIBRILLATOR (ICD)  04/2021    INSERTION OF TUNNELED CENTRAL VENOUS CATHETER (CVC) WITH SUBCUTANEOUS PORT N/A 1/25/2023    Procedure: INSERTION, DUAL LUMEN CATHETER WITH PORT, WITH IMAGING GUIDANCE;  Surgeon: FARIDEH Muñoz III, MD;  Location: [facility] OR [address] [place] [address];  Service: Peripheral Vascular;  Laterality: N/A;  possinble permcath placment    NEPHRECTOMY-LAPAROSCOPIC Left 01/12/2016    PERCUTANEOUS TRANSLUMINAL ANGIOPLASTY OF ARTERIOVENOUS FISTULA Left 4/19/2023    Procedure: PTA, AV FISTULA;  Surgeon: FARIDEH Muñoz III, MD;  Location: [facility] CATH LAB;  Service: Peripheral Vascular;  Laterality: Left;    PERITONEAL CATHETER INSERTION      Permacath insertion  01/12/2017    PLACEMENT OF ARTERIOVENOUS GRAFT  12/28/2022    Procedure: INSERTION, GRAFT, ARTERIOVENOUS;   Surgeon: FARIDEH Muñoz III, MD;  Location: [facility] OR [facility] FLR;  Service: Peripheral Vascular;;    REMOVAL, GRAFT, ARTERIOVENOUS, UPPER EXTREMITY Left 1/25/2023    Procedure: REMOVAL, GRAFT, ARTERIOVENOUS, UPPER EXTREMITY;  Surgeon: FARIDEH Muñoz III, MD;  Location: [facility] OR 2ND FLR;  Service: Peripheral Vascular;  Laterality: Left;    REVISION OF ARTERIOVENOUS FISTULA Left 5/1/2023    Procedure: REVISION, AV FISTULA;  Surgeon: FARIDEH Muñoz III, MD;  Location: [facility] OR [facility] FLR;  Service: Peripheral Vascular;  Laterality: Left;  LUE AVG revision    REVISION OF PROCEDURE INVOLVING ARTERIOVENOUS GRAFT Left 12/28/2022    Procedure: REVISION, PROCEDURE INVOLVING ARTERIOVENOUS GRAFT;  Surgeon: FARIDEH Muñoz III, MD;  [facility];  Service: Peripheral Vascular;  Laterality: Left;    SALPINGOOPHORECTOMY Right 2016    KJB---DAVINCI    TONSILLECTOMY      TUBAL LIGATION         FamHx:  Family History   Problem Relation Age of Onset    Hypertension Mother     Diabetes Father     Kidney disease Father     No Known Problems Sister     Heart disease Sister     No Known Problems Sister     No Known Problems Brother     No Known Problems Brother     Cataracts Maternal Aunt     No Known Problems Maternal Uncle     No Known Problems Paternal Aunt     No Known Problems Paternal Uncle     No Known Problems Maternal Grandmother     Diabetes Maternal Grandfather     No Known Problems Paternal Grandmother     No Known Problems Paternal Grandfather     No Known Problems Son     No Known Problems Son     No Known Problems Other     Breast cancer Neg Hx     Colon cancer Neg Hx     Cancer Neg Hx     Stroke Neg Hx     Amblyopia Neg Hx     Blindness Neg Hx     Glaucoma Neg Hx     Macular degeneration Neg Hx     Retinal detachment Neg Hx     Strabismus Neg Hx     Thyroid disease Neg Hx        SocHx:  Social History     Socioeconomic History    Marital status:     Number of children: 2   Occupational History    Occupation:  lucero     Employer: [id number]   Tobacco Use    Smoking status: Never    Smokeless tobacco: Never   Substance and Sexual Activity    Alcohol use: No     Comment: . 2 children. works at [employer].    Drug use: No    Sexual activity: Yes     Partners: Male     Social Determinants of Health     Financial Resource Strain: Low Risk     Difficulty of Paying Living Expenses: Not hard at all   Food Insecurity: No Food Insecurity    Worried About Running Out of Food in the Last Year: Never true    Ran Out of Food in the Last Year: Never true   Transportation Needs: No Transportation Needs    Lack of Transportation (Medical): No    Lack of Transportation (Non-Medical): No   Physical Activity: Insufficiently Active    Days of Exercise per Week: 3 days    Minutes of Exercise per Session: 30 min   Stress: No Stress Concern Present    Feeling of Stress : Not at all   Social Connections: Socially Integrated    Frequency of Communication with Friends and Family: More than three times a week    Frequency of Social Gatherings with Friends and Family: Once a week    Attends Spiritism Services: 1 to 4 times per year    Active Member of Clubs or Organizations: Yes    Attends Club or Organization Meetings: 1 to 4 times per year    Marital Status:    Housing Stability: Low Risk     Unable to Pay for Housing in the Last Year: No    Number of Places Lived in the Last Year: 1    Unstable Housing in the Last Year: No         Objective:     Vitals:    05/10/23 0852   BP: 125/85   Pulse: 100   Resp: 16   Temp: 99 °F (37.2 °C)         Physical Exam  Vitals and nursing note reviewed.   Constitutional:       General: She is not in acute distress.     Appearance: She is well-developed.      Comments: Thin appearance   HENT:      Head: Normocephalic and atraumatic.      Nose: Nose normal.      Mouth/Throat:      Pharynx: No oropharyngeal exudate.   Eyes:      General:         Right eye: No discharge.         Left eye: No  discharge.      Conjunctiva/sclera: Conjunctivae normal.      Pupils: Pupils are equal, round, and reactive to light.   Neck:      Trachea: No tracheal deviation.   Cardiovascular:      Comments: No swelling to bilateral lower extremities.   Musculoskeletal:         General: No tenderness. Normal range of motion.      Comments:      Skin:     General: Skin is warm and dry.      Coloration: Skin is not pale.      Findings: No erythema or rash.   Neurological:      Mental Status: She is alert and oriented to person, place, and time.   Psychiatric:         Behavior: Behavior normal.         Thought Content: Thought content normal.      LABS:  WBC   Date Value Ref Range Status   03/27/2023 5.69 3.90 - 12.70 K/uL Final     Hemoglobin   Date Value Ref Range Status   03/27/2023 10.9 (L) 12.0 - 16.0 g/dL Final     Hematocrit   Date Value Ref Range Status   03/27/2023 37.2 37.0 - 48.5 % Final     Platelets   Date Value Ref Range Status   03/27/2023 155 150 - 450 K/uL Final       Chemistry        Component Value Date/Time     03/27/2023 0824    K 3.6 03/27/2023 0824     03/27/2023 0824    CO2 24 03/27/2023 0824    BUN 46 (H) 03/27/2023 0824    CREATININE 5.7 (H) 03/27/2023 0824    GLU 93 03/27/2023 0824        Component Value Date/Time    CALCIUM 9.3 03/27/2023 0824    ALKPHOS 248 (H) 03/27/2023 0824    AST 31 03/27/2023 0824    ALT 11 03/27/2023 0824    BILITOT 0.5 03/27/2023 0824        Echo Saline Bubble? No  · The left ventricle is normal in size with mild eccentric hypertrophy and   severely decreased systolic function.  · The estimated ejection fraction is 23%.  · There is severe left ventricular global hypokinesis.  · Grade II left ventricular diastolic dysfunction.  · Moderate left atrial enlargement.  · Normal right ventricular size with normal right ventricular systolic   function.  · Moderate right atrial enlargement.  · There is mild aortic valve stenosis.  · Aortic valve area is 1.58 cm2; peak  velocity is 2.01 m/s; mean gradient   is 8 mmHg.  · Moderate mitral regurgitation.  · Moderate to severe tricuspid regurgitation.  · Mild pulmonic regurgitation.  · Elevated central venous pressure (15 mmHg).  · The estimated PA systolic pressure is 47 mmHg.  · There is mild-moderate pulmonary hypertension.  · Moderate posterolateral pericardial effusion. Trivial under the RA         Assessment:       1. Metastatic renal cell carcinoma, unspecified laterality            Plan:       Metastatic Renal Cell Carcinoma:    It is unlikely that surgery removed all sites of metastatic disease. Given that this is c/w her previous papillary disease, pt started on oral cabozantinib 60mg daily (for dual VEGF and MET inhibition), especially given recent data that shows cabo is far superior to Sutent in the first line setting.  If pt progresses on this regimen, will switch to nivolumab.     Outside labs stable.      RTC in 8 wks labs (CBC,CMP, TSH), CT CAP, and to see me.  ALL appts MUST be on a Monday, Wednesday, or Friday ONLY (pt has dialysis on Tuesday and Thursday).  Pt prefers early morning appts.       Patient is in agreement with the proposed treatment plan. All questions were answered to the patient's satisfaction. Pt knows to call clinic if anything is needed before the next clinic visit.     More than 40 mins total time were spent during this encounter.      Ben Rivera M.D., M.S., F.A.C.P.  Hematology/Oncology Attending  [facility]           Route Chart for Scheduling    Med Onc Chart Routing      Follow up with physician 2 months. RTC in 8 wks labs (CBC,CMP, TSH), CT CAP, and to see me.  ALL appts MUST be on a Monday, Wednesday, or Friday ONLY (pt has dialysis on Tuesday and Thursday).  Pt prefers early morning appts.   Follow up with [facility]    Infusion scheduling note    Injection scheduling note    Labs CBC, CMP and TSH   Scheduling:  Preferred lab:  Lab interval:     Imaging CT chest abdomen pelvis       Pharmacy appointment    Other referrals

## 2023-05-10 NOTE — PROGRESS NOTES
As Dr. Witt requested me to do is to inform Mrs. Bloom about her test results. I called Mrs. Bloom but her  answered and I then informed him that her  echo (ultrasound of the heart) is abnormal and heart function is unchanged at 25%. There is mild thickening of one of the heart valves. Continue medications as prescribed. He verbalized and understood.

## 2023-05-10 NOTE — PROGRESS NOTES
plz let pt. Know that echo (ultrasound of the heart) is abnormal and heart function is unchanged at 25%. There is mild thickening of one of the heart valves. Continue medications as prescribed

## 2023-05-12 ENCOUNTER — OFFICE VISIT (OUTPATIENT)
Dept: VASCULAR SURGERY | Facility: CLINIC | Age: 58
End: 2023-05-12
Payer: MEDICARE

## 2023-05-12 VITALS
HEART RATE: 80 BPM | DIASTOLIC BLOOD PRESSURE: 52 MMHG | SYSTOLIC BLOOD PRESSURE: 88 MMHG | BODY MASS INDEX: 16.93 KG/M2 | HEIGHT: 64 IN | TEMPERATURE: 98 F | WEIGHT: 99.19 LBS

## 2023-05-12 DIAGNOSIS — Z99.2 ESRD (END STAGE RENAL DISEASE) ON DIALYSIS: Primary | ICD-10-CM

## 2023-05-12 DIAGNOSIS — N18.6 ESRD (END STAGE RENAL DISEASE) ON DIALYSIS: Primary | ICD-10-CM

## 2023-05-12 PROCEDURE — 99024 PR POST-OP FOLLOW-UP VISIT: ICD-10-PCS | Mod: POP,,, | Performed by: SURGERY

## 2023-05-12 PROCEDURE — 99214 OFFICE O/P EST MOD 30 MIN: CPT | Mod: PBBFAC | Performed by: SURGERY

## 2023-05-12 PROCEDURE — 99999 PR PBB SHADOW E&M-EST. PATIENT-LVL IV: CPT | Mod: PBBFAC,,, | Performed by: SURGERY

## 2023-05-12 PROCEDURE — 99999 PR PBB SHADOW E&M-EST. PATIENT-LVL IV: ICD-10-PCS | Mod: PBBFAC,,, | Performed by: SURGERY

## 2023-05-12 PROCEDURE — 99024 POSTOP FOLLOW-UP VISIT: CPT | Mod: POP,,, | Performed by: SURGERY

## 2023-05-12 NOTE — PROGRESS NOTES
VASCULAR SURGERY SERVICE    CHIEF COMPLAINT:  Functional left AV graft dysfunctional left AV graft    HISTORY OF PRESENT ILLNESS: Eva Bloom is a 57 y.o. female end-stage renal disease, with multiple serious medical comorbidities including admission for sepsis 2 months ago, EF 20%, AFib on Eliquis, who is having increasing bleeding after her dialysis runs.  I placed a revision, left AV graft with long interposition Accu Seal 12/28/2022.  She has had uninterrupted use of this graft ever since.    Recent weeks she is had increased bleeding after dialysis sticks    S/p:    1aa. Reclosure, L AVG conter-incision 5/1/2023  1a. PTA, L AVG 4/19/2023  Excision, excluded L AVG and permacath 1/25/2023  revision, left AV graft with long interposition Accu Seal 12/28/2022  Original left AV graft 2017 with subsequent covered stent placement in venous outflow    03/10/2023:  She continues use the wound VAC on the medial excised site, with Medihoney to the small down the lateral counter incision of the new graft    5/12/1023:  She is been using the graft exclusively for 1 week.    Past Medical History:   Diagnosis Date    Anemia     Bronchitis 03/01/2017    Cancer 2016    kidney cancer    CHF (congestive heart failure), NYHA class II, chronic, systolic     CMV (cytomegalovirus) antibody positive     ESRD (end stage renal disease)     Essential hypertension 09/23/2015    H/O herpes simplex type 2 infection     Herpes simplex type 1 antibody positive     History of kidney cancer     s/p left nephrectomy 1/2016    Hyperparathyroidism, unspecified     Hyperuricemia without signs of inflammatory arthritis and tophaceous disease     Kidney stones     LGSIL (low grade squamous intraepithelial dysplasia)     Myocardiopathy 07/21/2017    Prediabetes     Proteinuria     Renal disorder     Thyroid nodule     Urate nephropathy        Past Surgical History:   Procedure Laterality Date    BREAST CYST EXCISION      COLONOSCOPY N/A  11/12/2015    COLONOSCOPY N/A 3/12/2021    Procedure: COLONOSCOPY;  Surgeon: Brendon Lanier MD;  Location: [facility] ENDO;  Service: Endoscopy;  Laterality: N/A;  covid test 3/9, labs prior, prep instr mailed -ml    INSERTION OF BIVENTRICULAR IMPLANTABLE CARDIOVERTER-DEFIBRILLATOR (ICD)  04/2021    INSERTION OF TUNNELED CENTRAL VENOUS CATHETER (CVC) WITH SUBCUTANEOUS PORT N/A 1/25/2023    Procedure: INSERTION, DUAL LUMEN CATHETER WITH PORT, WITH IMAGING GUIDANCE;  Surgeon: FARIDEH Muñoz III, MD;  [facility];  Service: Peripheral Vascular;  Laterality: N/A;  possinble permcath placment    NEPHRECTOMY-LAPAROSCOPIC Left 01/12/2016    PERCUTANEOUS TRANSLUMINAL ANGIOPLASTY OF ARTERIOVENOUS FISTULA Left 4/19/2023    Procedure: PTA, AV FISTULA;  Surgeon: FARIDEH Muñoz III, MD;  Location: [facility] CATH LAB;  Service: Peripheral Vascular;  Laterality: Left;    PERITONEAL CATHETER INSERTION      Permacath insertion  01/12/2017    PLACEMENT OF ARTERIOVENOUS GRAFT  12/28/2022    Procedure: INSERTION, GRAFT, ARTERIOVENOUS;  Surgeon: FARIDEH Muñoz III, MD;  [facility];  Service: Peripheral Vascular;;    REMOVAL, GRAFT, ARTERIOVENOUS, UPPER EXTREMITY Left 1/25/2023    Procedure: REMOVAL, GRAFT, ARTERIOVENOUS, UPPER EXTREMITY;  Surgeon: FARIDEH Muñoz III, MD;  Location: [facility] OR 2ND FLR;  Service: Peripheral Vascular;  Laterality: Left;    REVISION OF ARTERIOVENOUS FISTULA Left 5/1/2023    Procedure: REVISION, AV FISTULA;  Surgeon: FARIDEH Muñoz III, MD;  Location: [facility] OR [facility] FLR;  Service: Peripheral Vascular;  Laterality: Left;  LUE AVG revision    REVISION OF PROCEDURE INVOLVING ARTERIOVENOUS GRAFT Left 12/28/2022    Procedure: REVISION, PROCEDURE INVOLVING ARTERIOVENOUS GRAFT;  Surgeon: FARIDEH Muñoz III, MD;  Location: [facility] OR [facility] FLR;  Service: Peripheral Vascular;  Laterality: Left;    SALPINGOOPHORECTOMY Right 2016    KJB---DAVINCI    TONSILLECTOMY      TUBAL LIGATION           Current  Outpatient Medications:     acetaminophen (TYLENOL) 500 MG tablet, Take 500 mg by mouth every 6 (six) hours as needed for Pain., Disp: , Rfl:     apixaban (ELIQUIS) 2.5 mg Tab, Take 1 tablet (2.5 mg total) by mouth 2 (two) times daily., Disp: 60 tablet, Rfl: 11    cabozantinib (CABOMETYX) 60 mg Tab, TAKE ONE TABLET BY MOUTH ONCE DAILY AT THE SAME TIME ON AN EMPTY STOMACH AT LEAST 1 HOUR BEFORE OR 2 HOURS AFTER EATING. AVOID GRAPEFRUIT PRODUCTS, Disp: 30 tablet, Rfl: 4    cloNIDine 0.3 mg/24 hr td ptwk (CATAPRES) 0.3 mg/24 hr, Place 1 patch onto the skin every 7 days., Disp: 4 patch, Rfl: 11    gabapentin (NEURONTIN) 100 MG capsule, Take 1 capsule (100 mg total) by mouth once daily., Disp: 30 capsule, Rfl: 11    HYDROcodone-acetaminophen (NORCO) 5-325 mg per tablet, Take 1 tablet by mouth every 4 (four) hours as needed for Pain. Causes drowsiness. Do not drive or operate machinery with this medication. Do not drink alcohol with this medication. Causes constipation. Take with stool softener., Disp: 10 tablet, Rfl: 0    HYDROcodone-acetaminophen (NORCO) 5-325 mg per tablet, Take 1 tablet by mouth every 6 (six) hours as needed for Pain., Disp: 15 tablet, Rfl: 0    levothyroxine (SYNTHROID) 75 MCG tablet, Take 1 tablet (75 mcg total) by mouth once daily., Disp: 30 tablet, Rfl: 11    lidocaine-prilocaine (EMLA) cream, APPLY ATLEAST 30 MINUTES BEFORE TREATMENT 3 TIMES A WEEK, Disp: 30 g, Rfl: 11    metoprolol succinate (TOPROL-XL) 100 MG 24 hr tablet, Take 1/2 tablet (50mg) once daily, Disp: 90 tablet, Rfl: 3    mv,Ca,min-folic acid-vit K1 (ONE-A-DAY WOMEN'S 50 PLUS) 400-20 mcg Tab, Take 1 tablet by mouth once daily., Disp: , Rfl:     naloxone (NARCAN) 1 mg/mL injection, 2 mg (1 mg per nostril) by Nasal route as needed for opioid overdose; may repeat in 3 to 5 minutes if not effective. Call 911, Disp: 2 mL, Rfl: 11    oxyCODONE (OXY-IR) 5 mg Cap, Take 1 capsule (5 mg total) by mouth every 6 (six) hours as needed for Pain.,  Disp: 12 capsule, Rfl: 0    polyethylene glycol (GLYCOLAX) 17 gram/dose powder, Take 17 g by mouth once daily., Disp: 235 g, Rfl: 0    promethazine (PHENERGAN) 25 MG tablet, Take 1 tablet (25 mg total) by mouth every 6 (six) hours as needed for Nausea., Disp: 15 tablet, Rfl: 0    sacubitriL-valsartan (ENTRESTO) 49-51 mg per tablet, Take 1 tablet by mouth 2 (two) times daily., Disp: 60 tablet, Rfl: 11    hydrALAZINE (APRESOLINE) 100 MG tablet, Take 1 tablet (100 mg total) by mouth every 12 (twelve) hours., Disp: 180 tablet, Rfl: 2  No current facility-administered medications for this visit.    Facility-Administered Medications Ordered in Other Visits:     0.9%  NaCl infusion, , Intravenous, Continuous, Soni Bhatti MD    Review of patient's allergies indicates:   Allergen Reactions    Coreg [carvedilol] Other (See Comments)     Nausea/vomiting    Allopurinol      Other reaction(s): abnormal transaminases       Family History   Problem Relation Age of Onset    Hypertension Mother     Diabetes Father     Kidney disease Father     No Known Problems Sister     Heart disease Sister     No Known Problems Sister     No Known Problems Brother     No Known Problems Brother     Cataracts Maternal Aunt     No Known Problems Maternal Uncle     No Known Problems Paternal Aunt     No Known Problems Paternal Uncle     No Known Problems Maternal Grandmother     Diabetes Maternal Grandfather     No Known Problems Paternal Grandmother     No Known Problems Paternal Grandfather     No Known Problems Son     No Known Problems Son     No Known Problems Other     Breast cancer Neg Hx     Colon cancer Neg Hx     Cancer Neg Hx     Stroke Neg Hx     Amblyopia Neg Hx     Blindness Neg Hx     Glaucoma Neg Hx     Macular degeneration Neg Hx     Retinal detachment Neg Hx     Strabismus Neg Hx     Thyroid disease Neg Hx        Social History     Tobacco Use    Smoking status: Never    Smokeless tobacco: Never   Substance Use Topics    Alcohol  "use: No     Comment: . 2 children. works at [employer].    Drug use: No       PHYSICAL EXAM:   BP (!) 88/52 (BP Location: Right arm, Patient Position: Sitting, BP Method: Large (Automatic))   Pulse 80   Temp 98.2 °F (36.8 °C) (Oral)   Ht 5' 4" (1.626 m)   Wt 45 kg (99 lb 3.3 oz)   LMP 10/24/2016   BMI 17.03 kg/m²   Constitutional:  Alert,   Well-appearing  In no distress.   Neurological: Normal speech  no focal findings  CN II - XII grossly intact.    Psychiatric: Mood and affect appropriate and symmetric.   HEENT: Normocephalic / atraumatic  PERRLA  Midline trachea  No scars across the neck   Cardiac: Regular rate and rhythm.   Pulmonary: Normal pulmonary effort.   Abdomen: Soft, not distended.     Skin: Warm and well perfused.    Vascular:  Radial pulses are nonpalpable bilaterally.   Extremities/  Musculoskeletal: No edema.   Left arm demonstrates a soft thrill.  The counter incision nylon sutures are in place dry and intact.    Prior excision sites now completely healed       IMAGING:  No new imaging      IMPRESSION:    Today status post re-closure counter incision.  Healing well but not yet ready to take the sutures out   2.  12 weeks status post excision, infected excluded AV graft.  Persistent fibrinous area in the upper part of the excised area.  Counter incision now completely healed    PLAN   Follow-up in 1 week for suture removal and PermCath removal  2. Hold Eliquis for 2 days prior    MANPREET Muñoz III, MD, FACS  Professor and Chief, Vascular and Endovascular Surgery                        "

## 2023-05-15 ENCOUNTER — EXTERNAL HOME HEALTH (OUTPATIENT)
Dept: HOME HEALTH SERVICES | Facility: HOSPITAL | Age: 58
End: 2023-05-15
Payer: MEDICARE

## 2023-05-16 ENCOUNTER — DOCUMENT SCAN (OUTPATIENT)
Dept: HOME HEALTH SERVICES | Facility: HOSPITAL | Age: 58
End: 2023-05-16
Payer: MEDICARE

## 2023-05-19 ENCOUNTER — OFFICE VISIT (OUTPATIENT)
Dept: VASCULAR SURGERY | Facility: CLINIC | Age: 58
End: 2023-05-19
Payer: MEDICARE

## 2023-05-19 ENCOUNTER — TELEPHONE (OUTPATIENT)
Dept: TRANSPLANT | Facility: CLINIC | Age: 58
End: 2023-05-19
Payer: MEDICARE

## 2023-05-19 VITALS
WEIGHT: 99.19 LBS | DIASTOLIC BLOOD PRESSURE: 77 MMHG | TEMPERATURE: 98 F | HEIGHT: 64 IN | SYSTOLIC BLOOD PRESSURE: 139 MMHG | HEART RATE: 75 BPM | BODY MASS INDEX: 16.93 KG/M2

## 2023-05-19 DIAGNOSIS — Z99.2 ESRD (END STAGE RENAL DISEASE) ON DIALYSIS: Primary | ICD-10-CM

## 2023-05-19 DIAGNOSIS — I50.9 CONGESTIVE HEART FAILURE, UNSPECIFIED HF CHRONICITY, UNSPECIFIED HEART FAILURE TYPE: Primary | ICD-10-CM

## 2023-05-19 DIAGNOSIS — N18.6 ESRD (END STAGE RENAL DISEASE) ON DIALYSIS: Primary | ICD-10-CM

## 2023-05-19 PROCEDURE — 99999 PR PBB SHADOW E&M-EST. PATIENT-LVL IV: ICD-10-PCS | Mod: PBBFAC,,, | Performed by: SURGERY

## 2023-05-19 PROCEDURE — 99024 POSTOP FOLLOW-UP VISIT: CPT | Mod: POP,,, | Performed by: SURGERY

## 2023-05-19 PROCEDURE — 99214 OFFICE O/P EST MOD 30 MIN: CPT | Mod: PBBFAC | Performed by: SURGERY

## 2023-05-19 PROCEDURE — 99999 PR PBB SHADOW E&M-EST. PATIENT-LVL IV: CPT | Mod: PBBFAC,,, | Performed by: SURGERY

## 2023-05-19 PROCEDURE — 99024 PR POST-OP FOLLOW-UP VISIT: ICD-10-PCS | Mod: POP,,, | Performed by: SURGERY

## 2023-05-19 PROCEDURE — 36589 REMOVAL TUNNELED CV CATH: CPT | Mod: 58,S$PBB,, | Performed by: SURGERY

## 2023-05-19 PROCEDURE — 36589 PR REMOVAL TUNNELED CV CATH W/O SUBQ PORT OR PUMP: ICD-10-PCS | Mod: 58,S$PBB,, | Performed by: SURGERY

## 2023-05-19 PROCEDURE — 36589 REMOVAL TUNNELED CV CATH: CPT | Mod: PBBFAC | Performed by: SURGERY

## 2023-05-19 NOTE — PROGRESS NOTES
VASCULAR SURGERY SERVICE    CHIEF COMPLAINT:  Functional left AV graft dysfunctional left AV graft    HISTORY OF PRESENT ILLNESS: Eva Bloom is a 57 y.o. female end-stage renal disease, with multiple serious medical comorbidities including admission for sepsis 2 months ago, EF 20%, AFib on Eliquis, who is having increasing bleeding after her dialysis runs.  I placed a revision, left AV graft with long interposition Accu Seal 12/28/2022.  She has had uninterrupted use of this graft ever since.    Recent weeks she is had increased bleeding after dialysis sticks    S/p:    1aa. Reclosure, L AVG conter-incision 5/1/2023  1a. PTA, L AVG 4/19/2023  Excision, excluded L AVG and permacath 1/25/2023  revision, left AV graft with long interposition Accu Seal 12/28/2022  Original left AV graft 2017 with subsequent covered stent placement in venous outflow    03/10/2023:  She continues use the wound VAC on the medial excised site, with Medihoney to the small down the lateral counter incision of the new graft    5/12/1023:  She is been using the graft exclusively for 1 week.    05/19/2023:  She continue to use her left graft without difficulty.    Past Medical History:   Diagnosis Date    Anemia     Bronchitis 03/01/2017    Cancer 2016    kidney cancer    CHF (congestive heart failure), NYHA class II, chronic, systolic     CMV (cytomegalovirus) antibody positive     ESRD (end stage renal disease)     Essential hypertension 09/23/2015    H/O herpes simplex type 2 infection     Herpes simplex type 1 antibody positive     History of kidney cancer     s/p left nephrectomy 1/2016    Hyperparathyroidism, unspecified     Hyperuricemia without signs of inflammatory arthritis and tophaceous disease     Kidney stones     LGSIL (low grade squamous intraepithelial dysplasia)     Myocardiopathy 07/21/2017    Prediabetes     Proteinuria     Renal disorder     Thyroid nodule     Urate nephropathy        Past Surgical History:   Procedure  Laterality Date    BREAST CYST EXCISION      COLONOSCOPY N/A 11/12/2015    COLONOSCOPY N/A 3/12/2021    Procedure: COLONOSCOPY;  Surgeon: Brendon Lanier MD;  Location: [facility] ENDO;  Service: Endoscopy;  Laterality: N/A;  covid test 3/9, labs prior, prep instr mailed -ml    INSERTION OF BIVENTRICULAR IMPLANTABLE CARDIOVERTER-DEFIBRILLATOR (ICD)  04/2021    INSERTION OF TUNNELED CENTRAL VENOUS CATHETER (CVC) WITH SUBCUTANEOUS PORT N/A 1/25/2023    Procedure: INSERTION, DUAL LUMEN CATHETER WITH PORT, WITH IMAGING GUIDANCE;  Surgeon: FARIDEH Muñoz III, MD;  [facility];  Service: Peripheral Vascular;  Laterality: N/A;  possinble permcath placment    NEPHRECTOMY-LAPAROSCOPIC Left 01/12/2016    PERCUTANEOUS TRANSLUMINAL ANGIOPLASTY OF ARTERIOVENOUS FISTULA Left 4/19/2023    Procedure: PTA, AV FISTULA;  Surgeon: FARIDEH Muñoz III, MD;  Location: [facility] CATH LAB;  Service: Peripheral Vascular;  Laterality: Left;    PERITONEAL CATHETER INSERTION      Permacath insertion  01/12/2017    PLACEMENT OF ARTERIOVENOUS GRAFT  12/28/2022    Procedure: INSERTION, GRAFT, ARTERIOVENOUS;  Surgeon: FARIDEH Muñoz III, MD;  Location: [facility] OR [facility] FLR;  Service: Peripheral Vascular;;    REMOVAL, GRAFT, ARTERIOVENOUS, UPPER EXTREMITY Left 1/25/2023    Procedure: REMOVAL, GRAFT, ARTERIOVENOUS, UPPER EXTREMITY;  Surgeon: FARIDEH Muñoz III, MD;  Location: [facility] OR [facility] FLR;  Service: Peripheral Vascular;  Laterality: Left;    REVISION OF ARTERIOVENOUS FISTULA Left 5/1/2023    Procedure: REVISION, AV FISTULA;  Surgeon: FARIDEH Muñoz III, MD;  Location: [facility] OR [facility] FLR;  Service: Peripheral Vascular;  Laterality: Left;  LUE AVG revision    REVISION OF PROCEDURE INVOLVING ARTERIOVENOUS GRAFT Left 12/28/2022    Procedure: REVISION, PROCEDURE INVOLVING ARTERIOVENOUS GRAFT;  Surgeon: FARIDEH Muñoz III, MD;  Location: [facility] OR [facility] FLR;  Service: Peripheral Vascular;  Laterality: Left;    SALPINGOOPHORECTOMY Right 2016     GALLITO---GENE    TONSILLECTOMY      TUBAL LIGATION           Current Outpatient Medications:     acetaminophen (TYLENOL) 500 MG tablet, Take 500 mg by mouth every 6 (six) hours as needed for Pain., Disp: , Rfl:     apixaban (ELIQUIS) 2.5 mg Tab, Take 1 tablet (2.5 mg total) by mouth 2 (two) times daily., Disp: 60 tablet, Rfl: 11    cabozantinib (CABOMETYX) 60 mg Tab, TAKE ONE TABLET BY MOUTH ONCE DAILY AT THE SAME TIME ON AN EMPTY STOMACH AT LEAST 1 HOUR BEFORE OR 2 HOURS AFTER EATING. AVOID GRAPEFRUIT PRODUCTS, Disp: 30 tablet, Rfl: 4    cloNIDine 0.3 mg/24 hr td ptwk (CATAPRES) 0.3 mg/24 hr, Place 1 patch onto the skin every 7 days., Disp: 4 patch, Rfl: 11    gabapentin (NEURONTIN) 100 MG capsule, Take 1 capsule (100 mg total) by mouth once daily., Disp: 30 capsule, Rfl: 11    HYDROcodone-acetaminophen (NORCO) 5-325 mg per tablet, Take 1 tablet by mouth every 4 (four) hours as needed for Pain. Causes drowsiness. Do not drive or operate machinery with this medication. Do not drink alcohol with this medication. Causes constipation. Take with stool softener., Disp: 10 tablet, Rfl: 0    HYDROcodone-acetaminophen (NORCO) 5-325 mg per tablet, Take 1 tablet by mouth every 6 (six) hours as needed for Pain., Disp: 15 tablet, Rfl: 0    levothyroxine (SYNTHROID) 75 MCG tablet, Take 1 tablet (75 mcg total) by mouth once daily., Disp: 30 tablet, Rfl: 11    lidocaine-prilocaine (EMLA) cream, APPLY ATLEAST 30 MINUTES BEFORE TREATMENT 3 TIMES A WEEK, Disp: 30 g, Rfl: 11    metoprolol succinate (TOPROL-XL) 100 MG 24 hr tablet, Take 1/2 tablet (50mg) once daily, Disp: 90 tablet, Rfl: 3    mv,Ca,min-folic acid-vit K1 (ONE-A-DAY WOMEN'S 50 PLUS) 400-20 mcg Tab, Take 1 tablet by mouth once daily., Disp: , Rfl:     naloxone (NARCAN) 1 mg/mL injection, 2 mg (1 mg per nostril) by Nasal route as needed for opioid overdose; may repeat in 3 to 5 minutes if not effective. Call 911, Disp: 2 mL, Rfl: 11    oxyCODONE (OXY-IR) 5 mg Cap, Take 1  capsule (5 mg total) by mouth every 6 (six) hours as needed for Pain., Disp: 12 capsule, Rfl: 0    polyethylene glycol (GLYCOLAX) 17 gram/dose powder, Take 17 g by mouth once daily., Disp: 235 g, Rfl: 0    promethazine (PHENERGAN) 25 MG tablet, Take 1 tablet (25 mg total) by mouth every 6 (six) hours as needed for Nausea., Disp: 15 tablet, Rfl: 0    sacubitriL-valsartan (ENTRESTO) 49-51 mg per tablet, Take 1 tablet by mouth 2 (two) times daily., Disp: 60 tablet, Rfl: 11    hydrALAZINE (APRESOLINE) 100 MG tablet, Take 1 tablet (100 mg total) by mouth every 12 (twelve) hours., Disp: 180 tablet, Rfl: 2  No current facility-administered medications for this visit.    Facility-Administered Medications Ordered in Other Visits:     0.9%  NaCl infusion, , Intravenous, Continuous, Soni Bhatti MD    Review of patient's allergies indicates:   Allergen Reactions    Coreg [carvedilol] Other (See Comments)     Nausea/vomiting    Allopurinol      Other reaction(s): abnormal transaminases       Family History   Problem Relation Age of Onset    Hypertension Mother     Diabetes Father     Kidney disease Father     No Known Problems Sister     Heart disease Sister     No Known Problems Sister     No Known Problems Brother     No Known Problems Brother     Cataracts Maternal Aunt     No Known Problems Maternal Uncle     No Known Problems Paternal Aunt     No Known Problems Paternal Uncle     No Known Problems Maternal Grandmother     Diabetes Maternal Grandfather     No Known Problems Paternal Grandmother     No Known Problems Paternal Grandfather     No Known Problems Son     No Known Problems Son     No Known Problems Other     Breast cancer Neg Hx     Colon cancer Neg Hx     Cancer Neg Hx     Stroke Neg Hx     Amblyopia Neg Hx     Blindness Neg Hx     Glaucoma Neg Hx     Macular degeneration Neg Hx     Retinal detachment Neg Hx     Strabismus Neg Hx     Thyroid disease Neg Hx        Social History     Tobacco Use    Smoking  "status: Never    Smokeless tobacco: Never   Substance Use Topics    Alcohol use: No     Comment: . 2 children. works at [employer].    Drug use: No       PHYSICAL EXAM:   /77 (BP Location: Right arm, Patient Position: Sitting, BP Method: Large (Automatic))   Pulse 75   Temp 98.2 °F (36.8 °C) (Oral)   Ht 5' 4" (1.626 m)   Wt 45 kg (99 lb 3.3 oz)   LMP 10/24/2016   BMI 17.03 kg/m²   Constitutional:  Alert,   Well-appearing  In no distress.   Neurological: Normal speech  no focal findings  CN II - XII grossly intact.    Psychiatric: Mood and affect appropriate and symmetric.   HEENT: Normocephalic / atraumatic  PERRLA  Midline trachea  No scars across the neck   Cardiac: Regular rate and rhythm.   Pulmonary: Normal pulmonary effort.   Abdomen: Soft, not distended.     Skin: Warm and well perfused.    Vascular:  Radial pulses are nonpalpable bilaterally.   Extremities/  Musculoskeletal: No edema.   Left arm demonstrates a soft thrill.  The counter incision nylon sutures are in place dry and intact.    Prior excision sites now completely healed       IMAGING:  No new imaging      IMPRESSION:    Functioning graft.  Counter incision re-closure is well healed    PLAN  DC counter incision sutures today  Remove PermCath  Follow-up in 3 months with AV graft duplex, sooner if clinically indicated  PROCEDURE NOTE:   After sterile prep and drape and infiltration with lidocaine, the permacath cuff was freed up using blunt and sharp dissection, the permacath removed, and manual compression used for hemostasis.   No complications     MANPREET Muñoz III, MD, FACS  Professor and Chief, Vascular and Endovascular Surgery                          "

## 2023-05-24 ENCOUNTER — OFFICE VISIT (OUTPATIENT)
Dept: TRANSPLANT | Facility: CLINIC | Age: 58
End: 2023-05-24
Payer: MEDICARE

## 2023-05-24 ENCOUNTER — LAB VISIT (OUTPATIENT)
Dept: LAB | Facility: HOSPITAL | Age: 58
End: 2023-05-24
Attending: INTERNAL MEDICINE
Payer: MEDICARE

## 2023-05-24 VITALS
HEART RATE: 76 BPM | WEIGHT: 104.25 LBS | BODY MASS INDEX: 17.8 KG/M2 | DIASTOLIC BLOOD PRESSURE: 58 MMHG | HEIGHT: 64 IN | SYSTOLIC BLOOD PRESSURE: 127 MMHG

## 2023-05-24 DIAGNOSIS — N18.6 ESRD (END STAGE RENAL DISEASE) ON DIALYSIS: ICD-10-CM

## 2023-05-24 DIAGNOSIS — I10 ESSENTIAL HYPERTENSION: Primary | ICD-10-CM

## 2023-05-24 DIAGNOSIS — I34.0 NONRHEUMATIC MITRAL VALVE REGURGITATION: ICD-10-CM

## 2023-05-24 DIAGNOSIS — Z95.810 ICD (IMPLANTABLE CARDIOVERTER-DEFIBRILLATOR) IN PLACE: ICD-10-CM

## 2023-05-24 DIAGNOSIS — M89.9 CHRONIC KIDNEY DISEASE-MINERAL AND BONE DISORDER: ICD-10-CM

## 2023-05-24 DIAGNOSIS — N18.9 CHRONIC KIDNEY DISEASE-MINERAL AND BONE DISORDER: ICD-10-CM

## 2023-05-24 DIAGNOSIS — I50.9 CONGESTIVE HEART FAILURE, UNSPECIFIED HF CHRONICITY, UNSPECIFIED HEART FAILURE TYPE: ICD-10-CM

## 2023-05-24 DIAGNOSIS — I42.8 CARDIOMYOPATHY, NONISCHEMIC: ICD-10-CM

## 2023-05-24 DIAGNOSIS — Z90.5 S/P NEPHRECTOMY: Chronic | ICD-10-CM

## 2023-05-24 DIAGNOSIS — I50.42 CHRONIC COMBINED SYSTOLIC AND DIASTOLIC CONGESTIVE HEART FAILURE: ICD-10-CM

## 2023-05-24 DIAGNOSIS — E83.9 CHRONIC KIDNEY DISEASE-MINERAL AND BONE DISORDER: ICD-10-CM

## 2023-05-24 DIAGNOSIS — Z99.2 ESRD (END STAGE RENAL DISEASE) ON DIALYSIS: ICD-10-CM

## 2023-05-24 LAB
ALBUMIN SERPL BCP-MCNC: 2.2 G/DL (ref 3.5–5.2)
ALP SERPL-CCNC: 284 U/L (ref 55–135)
ALT SERPL W/O P-5'-P-CCNC: 12 U/L (ref 10–44)
ANION GAP SERPL CALC-SCNC: 17 MMOL/L (ref 8–16)
AST SERPL-CCNC: 23 U/L (ref 10–40)
BILIRUB SERPL-MCNC: 0.7 MG/DL (ref 0.1–1)
BNP SERPL-MCNC: >4900 PG/ML (ref 0–99)
BUN SERPL-MCNC: 54 MG/DL (ref 6–20)
CALCIUM SERPL-MCNC: 7.7 MG/DL (ref 8.7–10.5)
CHLORIDE SERPL-SCNC: 103 MMOL/L (ref 95–110)
CO2 SERPL-SCNC: 21 MMOL/L (ref 23–29)
CREAT SERPL-MCNC: 5.6 MG/DL (ref 0.5–1.4)
EST. GFR  (NO RACE VARIABLE): 8.3 ML/MIN/1.73 M^2
GLUCOSE SERPL-MCNC: 187 MG/DL (ref 70–110)
MAGNESIUM SERPL-MCNC: 2.1 MG/DL (ref 1.6–2.6)
POTASSIUM SERPL-SCNC: 3.8 MMOL/L (ref 3.5–5.1)
PROT SERPL-MCNC: 5.9 G/DL (ref 6–8.4)
SODIUM SERPL-SCNC: 141 MMOL/L (ref 136–145)
T4 FREE SERPL-MCNC: 0.79 NG/DL (ref 0.71–1.51)
TSH SERPL DL<=0.005 MIU/L-ACNC: 8.53 UIU/ML (ref 0.4–4)

## 2023-05-24 PROCEDURE — 83880 ASSAY OF NATRIURETIC PEPTIDE: CPT | Performed by: INTERNAL MEDICINE

## 2023-05-24 PROCEDURE — 99999 PR PBB SHADOW E&M-EST. PATIENT-LVL IV: ICD-10-PCS | Mod: PBBFAC,,, | Performed by: INTERNAL MEDICINE

## 2023-05-24 PROCEDURE — 99999 PR PBB SHADOW E&M-EST. PATIENT-LVL IV: CPT | Mod: PBBFAC,,, | Performed by: INTERNAL MEDICINE

## 2023-05-24 PROCEDURE — 84439 ASSAY OF FREE THYROXINE: CPT | Performed by: INTERNAL MEDICINE

## 2023-05-24 PROCEDURE — 99214 PR OFFICE/OUTPT VISIT, EST, LEVL IV, 30-39 MIN: ICD-10-PCS | Mod: S$PBB,,, | Performed by: INTERNAL MEDICINE

## 2023-05-24 PROCEDURE — 83880 ASSAY OF NATRIURETIC PEPTIDE: CPT | Mod: 91 | Performed by: INTERNAL MEDICINE

## 2023-05-24 PROCEDURE — 80053 COMPREHEN METABOLIC PANEL: CPT | Performed by: INTERNAL MEDICINE

## 2023-05-24 PROCEDURE — 99214 OFFICE O/P EST MOD 30 MIN: CPT | Mod: S$PBB,,, | Performed by: INTERNAL MEDICINE

## 2023-05-24 PROCEDURE — 99214 OFFICE O/P EST MOD 30 MIN: CPT | Mod: PBBFAC | Performed by: INTERNAL MEDICINE

## 2023-05-24 PROCEDURE — 84443 ASSAY THYROID STIM HORMONE: CPT | Performed by: INTERNAL MEDICINE

## 2023-05-24 PROCEDURE — 83735 ASSAY OF MAGNESIUM: CPT | Performed by: INTERNAL MEDICINE

## 2023-05-24 NOTE — LETTER
May 24, 2023        Williams Hart  [address]  [place] [address]  Phone: [phone]  Fax: [phone]             [id number]  [address]  [facility] [address]  Phone: [phone]   Patient: Eva Bloom   MR Number: [id number]   YOB: 1965   Date of Visit: 5/24/2023       Dear Dr. Williams Hart    Thank you for referring Eva Bloom to me for evaluation. Attached you will find relevant portions of my assessment and plan of care.    If you have questions, please do not hesitate to call me. I look forward to following Eva Bloom along with you.    Sincerely,    Dav Briones MD    Enclosure    If you would like to receive this communication electronically, please contact [email or web address] or [phone] to request [employer] access.    [employer] is a tool which provides read-only access to select patient information with whom you have a relationship. Its easy to use and provides real time access to review your patients record including encounter summaries, notes, results, and demographic information.    If you feel you have received this communication in error or would no longer like to receive these types of communications, please e-mail [email or web address]

## 2023-05-24 NOTE — PROGRESS NOTES
Subjective:   Patient ID:  Eva Bloom is a 57 y.o. female who presents for evaluation of CHF.    58 YO F w/ PMH of NICM, HFrEF, LVEF 23%, s/p SC ICD (4/2021), paroxysmal AF, HTN, ESRD on HD, metastatic RCC on therapy followed by Heme/Onc who presents for evaluation of CHF.    She is seen by Dr. Rita Witt MD for her HFrEF and AF. She was referred to me for her CHF.    She has NICM dating back to 2017 when she started having a progressive decline in LVEF. Multiple PET stress tests done since diagnosis have been negative for evidence of ischemic heart disease.    She has a history of metastatic RCC and is on cabozantinib since Dec 2016.    She presents today for evaluation.  She presents with her  who is her primary caregiver.  She currently is not working and is on disability.  She gets hemodialysis through a left upper extremity AV fistula on Tuesday, Thursday, and Saturday.  Outside of this she is not very physically active and is very much limited by fatigue, poor appetite, and low energy levels.  She tries to walk around the house, but when she is at the hospital or in clinic she uses a wheelchair to ambulate.  With day-to-day activity she denies any cardiac symptoms per se.  Denies chest discomfort, shortness of breath, palpitations, ICD shocks, presyncope, syncope, leg swelling, PND, or orthopnea.  Her primary limitation is as noted above fatigue and low energy levels.    TTE 5/8/23  The left ventricle is normal in size with mild eccentric hypertrophy and severely decreased systolic function.  The estimated ejection fraction is 23%.  There is severe left ventricular global hypokinesis.  Grade II left ventricular diastolic dysfunction.  Moderate left atrial enlargement.  Normal right ventricular size with normal right ventricular systolic function.  Moderate right atrial enlargement.  There is mild aortic valve stenosis.  Aortic valve area is 1.58 cm2; peak velocity is 2.01 m/s; mean gradient  is 8 mmHg.  Moderate mitral regurgitation.  Moderate to severe tricuspid regurgitation.  Mild pulmonic regurgitation.  Elevated central venous pressure (15 mmHg).  The estimated PA systolic pressure is 47 mmHg.  There is mild-moderate pulmonary hypertension.  Moderate posterolateral pericardial effusion. Trivial under the RA  LVEDD 5.5 cm    Review of Systems   Constitutional: Positive for decreased appetite, malaise/fatigue and weight loss. Negative for chills and fever.   HENT:  Negative for hearing loss.    Eyes:  Negative for visual disturbance.   Cardiovascular:  Negative for chest pain, dyspnea on exertion, irregular heartbeat, leg swelling, orthopnea, palpitations, paroxysmal nocturnal dyspnea and syncope.   Respiratory:  Negative for cough and shortness of breath.    Musculoskeletal:  Negative for muscle weakness.   Gastrointestinal:  Negative for diarrhea, nausea and vomiting.   Neurological:  Negative for focal weakness.   Psychiatric/Behavioral:  Negative for memory loss.      Objective:     Vitals:    05/24/23 1321   BP: (!) 127/58   Pulse: 76     Physical Exam  Constitutional:       Comments: Frail, appears older than stated age, in a wheelchair.   HENT:      Head: Atraumatic.   Eyes:      Extraocular Movements: Extraocular movements intact.   Cardiovascular:      Rate and Rhythm: Normal rate and regular rhythm.      Pulses: Normal pulses.      Heart sounds: Normal heart sounds.   Pulmonary:      Breath sounds: Normal breath sounds.   Abdominal:      Palpations: Abdomen is soft.      Tenderness: There is no abdominal tenderness.   Musculoskeletal:         General: Normal range of motion.      Right lower leg: Edema present.      Left lower leg: Edema present.   Neurological:      General: No focal deficit present.      Mental Status: She is alert and oriented to person, place, and time.       Assessment:      1. Essential hypertension    2. Cardiomyopathy, nonischemic    3. Nonrheumatic mitral valve  regurgitation    4. Chronic combined systolic and diastolic congestive heart failure    5. ICD (implantable cardioverter-defibrillator) in place - SubQ    6. S/p nephrectomy left    7. ESRD (end stage renal disease) on dialysis    8. Chronic kidney disease-mineral and bone disorder        Plan:     NICM  HFrEF, LVEF 23%  S/P SC ICD  Paroxysmal AF  Metastatic RCC  ESRD on HD  - presents today for evaluation.  I have any major cardiovascular complaints per se, but significant fatigue, related to her metastatic renal cell carcinoma, and reduced appetite/weight loss.  - currently on guideline directed medical therapy with metoprolol succinate 50 daily, Entresto, and hydralazine 100 mg b.i.d..  - not on MRA or SGLT2i due to end-stage renal disease on hemodialysis.  - is aneuric, volume management is purely via dialysis.  - at this point, recommend continuation of guideline directed medical therapy as prescribed.  Recommend continuation of anticoagulation for her paroxysmal atrial fibrillation.  - unfortunately her metastatic renal cell carcinoma, end-stage renal disease on hemodialysis would be barriers for consideration for advanced therapies such as heart transplantation or LVAD.    Continue FU with general cardiology.

## 2023-05-25 LAB — NT-PROBNP SERPL IA-MCNC: ABNORMAL PG/ML

## 2023-05-31 ENCOUNTER — EXTERNAL HOME HEALTH (OUTPATIENT)
Dept: HOME HEALTH SERVICES | Facility: HOSPITAL | Age: 58
End: 2023-05-31
Payer: MEDICARE

## 2023-06-02 ENCOUNTER — PES CALL (OUTPATIENT)
Dept: ADMINISTRATIVE | Facility: CLINIC | Age: 58
End: 2023-06-02
Payer: MEDICARE

## 2023-06-16 ENCOUNTER — OFFICE VISIT (OUTPATIENT)
Dept: OTOLARYNGOLOGY | Facility: CLINIC | Age: 58
End: 2023-06-16
Payer: MEDICARE

## 2023-06-16 VITALS — DIASTOLIC BLOOD PRESSURE: 71 MMHG | SYSTOLIC BLOOD PRESSURE: 134 MMHG | HEART RATE: 87 BPM

## 2023-06-16 DIAGNOSIS — N18.6 ESRD (END STAGE RENAL DISEASE) ON DIALYSIS: ICD-10-CM

## 2023-06-16 DIAGNOSIS — J34.89 OTHER SPECIFIED DISORDERS OF NOSE AND NASAL SINUSES: ICD-10-CM

## 2023-06-16 DIAGNOSIS — Z99.2 ESRD (END STAGE RENAL DISEASE) ON DIALYSIS: ICD-10-CM

## 2023-06-16 DIAGNOSIS — R04.0 RECURRENT EPISTAXIS: Primary | ICD-10-CM

## 2023-06-16 PROCEDURE — 99999 PR PBB SHADOW E&M-EST. PATIENT-LVL IV: CPT | Mod: PBBFAC,,, | Performed by: STUDENT IN AN ORGANIZED HEALTH CARE EDUCATION/TRAINING PROGRAM

## 2023-06-16 PROCEDURE — 99213 PR OFFICE/OUTPT VISIT, EST, LEVL III, 20-29 MIN: ICD-10-PCS | Mod: 25,S$PBB,, | Performed by: STUDENT IN AN ORGANIZED HEALTH CARE EDUCATION/TRAINING PROGRAM

## 2023-06-16 PROCEDURE — 99214 OFFICE O/P EST MOD 30 MIN: CPT | Mod: PBBFAC | Performed by: STUDENT IN AN ORGANIZED HEALTH CARE EDUCATION/TRAINING PROGRAM

## 2023-06-16 PROCEDURE — 87185 SC STD ENZYME DETCJ PER NZM: CPT | Performed by: STUDENT IN AN ORGANIZED HEALTH CARE EDUCATION/TRAINING PROGRAM

## 2023-06-16 PROCEDURE — 99999 PR PBB SHADOW E&M-EST. PATIENT-LVL IV: ICD-10-PCS | Mod: PBBFAC,,, | Performed by: STUDENT IN AN ORGANIZED HEALTH CARE EDUCATION/TRAINING PROGRAM

## 2023-06-16 PROCEDURE — 99213 OFFICE O/P EST LOW 20 MIN: CPT | Mod: 25,S$PBB,, | Performed by: STUDENT IN AN ORGANIZED HEALTH CARE EDUCATION/TRAINING PROGRAM

## 2023-06-16 PROCEDURE — 87070 CULTURE OTHR SPECIMN AEROBIC: CPT | Performed by: STUDENT IN AN ORGANIZED HEALTH CARE EDUCATION/TRAINING PROGRAM

## 2023-06-16 PROCEDURE — 87075 CULTR BACTERIA EXCEPT BLOOD: CPT | Performed by: STUDENT IN AN ORGANIZED HEALTH CARE EDUCATION/TRAINING PROGRAM

## 2023-06-16 RX ORDER — DOXYCYCLINE 100 MG/1
100 CAPSULE ORAL 2 TIMES DAILY
Qty: 20 CAPSULE | Refills: 0 | Status: SHIPPED | OUTPATIENT
Start: 2023-06-16 | End: 2023-06-26

## 2023-06-16 NOTE — PROGRESS NOTES
Subjective:      Eva is a 57 y.o. female who comes for follow-up of  epistaxis .  Her last visit with me was on 1/4/2023.  Has been having some blood tinged secretions over the last 1-2 months. Has associated yellow nasal drainage and facial pressure.     Her current sinus regime consists of: Flonase & nasal saline.     The assessment of quality and severity of symptoms as measured by the SNOT-22 score was deferred.     The patient's medications, allergies, past medical, surgical, social and family histories were reviewed and updated as appropriate.    A detailed review of systems was obtained with pertinent positives as per the above HPI, and otherwise negative.        Objective:     /71   Pulse 87   LMP 10/24/2016        Constitutional:   Vital signs are normal. She appears well-developed. Normal speech.      Head:  Normocephalic and atraumatic.     Ears:    Right Ear: No drainage or tenderness. No middle ear effusion.   Left Ear: No drainage or tenderness.  No middle ear effusion.     Mouth/Throat  Oropharynx clear and moist without lesions or asymmetry and normal uvula midline. No trismus. No oropharyngeal exudate. Mirror exam not performed due to patient tolerance.  Mirror exam not performed due to patient tolerance.      Neck:  Neck normal without thyromegaly masses, asymmetry, normal tracheal structure, crepitus, and tenderness, thyroid normal and trachea normal.     Pulmonary/Chest:   Effort normal.     Psychiatric:   She has a normal mood and affect. Her speech is normal.     Skin:   No abrasions, lacerations, lesions, or rashes.     Procedure    Nasal endoscopy performed.  See procedure note.    Nasal Endoscopy:  6/16/2023    The use of diagnostic nasal endoscopy was considered medically necessary for the evaluation and visualization of the nasal anatomy for symptoms suggestive of nasal or sinus origin. Physical examination (including a nasal speculum evaluation) did not provide sufficient  clinical information to establish a diagnosis, or symptoms did not improve or worsened following treatment.     The nasal cavity was decongested with topical 1% phenylephrine and anesthetized with 4% lidocaine.  A rigid 0-degree endoscope was introduced into the nasal cavity.    The patient was seated in the examination chair. After discussion of risks and benefits, a nasal endoscope was inserted into the nose the endoscope was passed along the left nasal floor to the nasopharynx. It was then passed between the middle and superior meatus, nasal turbinates, nasal septum, nasopharynx and sphenoethmoid region. The nasal endoscope was withdrawn and there was no complications. An identical procedure was performed on the right side. I was present for the entire procedure.The patient tolerated the above procedure well. The findings of this procedure can be found in the dictated note from 6/16/2023 visit.                            Data Reviewed    WBC (K/uL)   Date Value   03/27/2023 5.69     Eosinophil % (%)   Date Value   03/27/2023 11.1 (H)     Eos # (K/uL)   Date Value   03/27/2023 0.6 (H)     Platelets (K/uL)   Date Value   03/27/2023 155     Glucose (mg/dL)   Date Value   05/24/2023 187 (H)     No results found for: IGE    I independently reviewed the images of the CT head dated 10/29/22. Pertinent findings include bilateral maxillary sinus opacification L>R, left ethmoid sinus opacification. Some thinning of the left posterior maxillary sinus wall. Mucosal thickening of the left frontal sinus.       Assessment:     1. Recurrent epistaxis    2. ESRD (end stage renal disease) on dialysis    3. Other specified disorders of nose and nasal sinuses         Plan:     - Doxy  - Cultures of the sinuses were obtained today, if the antibiotics need to be changed bases based on the sensitivities I will adjust them as needed.   - CT medtronic  - RTC 1 month    Malik Burgos MD

## 2023-06-19 LAB — BACTERIA SPEC AEROBE CULT: ABNORMAL

## 2023-06-20 LAB — BACTERIA SPEC ANAEROBE CULT: NORMAL

## 2023-06-21 ENCOUNTER — HOSPITAL ENCOUNTER (OUTPATIENT)
Dept: ENDOCRINOLOGY | Facility: CLINIC | Age: 58
Discharge: HOME | End: 2023-06-21
Attending: STUDENT IN AN ORGANIZED HEALTH CARE EDUCATION/TRAINING PROGRAM
Payer: MEDICARE

## 2023-06-21 DIAGNOSIS — E04.2 MULTIPLE THYROID NODULES: ICD-10-CM

## 2023-06-21 DIAGNOSIS — E04.2 MULTINODULAR GOITER: Primary | ICD-10-CM

## 2023-06-21 PROCEDURE — 88173 CYTOPATH EVAL FNA REPORT: CPT | Mod: 26,59,, | Performed by: PATHOLOGY

## 2023-06-21 PROCEDURE — 88173 CYTOPATH EVAL FNA REPORT: CPT | Performed by: PATHOLOGY

## 2023-06-21 PROCEDURE — 10005 US FINE NEEDLE ASPIRATION BIOPSY, FIRST LESION: ICD-10-PCS | Mod: ,,, | Performed by: INTERNAL MEDICINE

## 2023-06-21 PROCEDURE — 10005 FNA BX W/US GDN 1ST LES: CPT | Mod: ,,, | Performed by: INTERNAL MEDICINE

## 2023-06-21 PROCEDURE — 10006 FNA BX W/US GDN EA ADDL: CPT | Mod: ,,, | Performed by: INTERNAL MEDICINE

## 2023-06-21 PROCEDURE — 88173 PR  INTERPRETATION OF FNA SMEAR: ICD-10-PCS | Mod: 26,59,, | Performed by: PATHOLOGY

## 2023-06-21 PROCEDURE — 88173 CYTOPATH EVAL FNA REPORT: CPT | Mod: 26,,, | Performed by: PATHOLOGY

## 2023-06-21 PROCEDURE — 88173 PR  INTERPRETATION OF FNA SMEAR: ICD-10-PCS | Mod: 26,,, | Performed by: PATHOLOGY

## 2023-06-21 PROCEDURE — 10006 US FINE NEEDLE ASPIRATION BIOPSY , ADDL LESION: ICD-10-PCS | Mod: ,,, | Performed by: INTERNAL MEDICINE

## 2023-06-21 PROCEDURE — 88173 CYTOPATH EVAL FNA REPORT: CPT | Mod: 59 | Performed by: PATHOLOGY

## 2023-06-22 ENCOUNTER — PATIENT MESSAGE (OUTPATIENT)
Dept: INTERNAL MEDICINE | Facility: CLINIC | Age: 58
End: 2023-06-22
Payer: MEDICARE

## 2023-06-23 LAB
FINAL PATHOLOGIC DIAGNOSIS: NORMAL
Lab: NORMAL

## 2023-06-26 LAB
FINAL PATHOLOGIC DIAGNOSIS: NORMAL
Lab: NORMAL

## 2023-07-03 ENCOUNTER — HOSPITAL ENCOUNTER (OUTPATIENT)
Dept: RADIOLOGY | Facility: HOSPITAL | Age: 58
Discharge: HOME | End: 2023-07-03
Attending: STUDENT IN AN ORGANIZED HEALTH CARE EDUCATION/TRAINING PROGRAM
Payer: MEDICARE

## 2023-07-03 DIAGNOSIS — J34.89 OTHER SPECIFIED DISORDERS OF NOSE AND NASAL SINUSES: ICD-10-CM

## 2023-07-03 PROCEDURE — 70486 CT MEDTRONIC SINUSES WITHOUT: ICD-10-PCS | Mod: 26,,, | Performed by: RADIOLOGY

## 2023-07-03 PROCEDURE — 70486 CT MAXILLOFACIAL W/O DYE: CPT | Mod: TC

## 2023-07-03 PROCEDURE — 70486 CT MAXILLOFACIAL W/O DYE: CPT | Mod: 26,,, | Performed by: RADIOLOGY

## 2023-07-04 ENCOUNTER — PATIENT MESSAGE (OUTPATIENT)
Dept: INTERNAL MEDICINE | Facility: CLINIC | Age: 58
End: 2023-07-04
Payer: MEDICARE

## 2023-07-05 PROCEDURE — G0179 MD RECERTIFICATION HHA PT: HCPCS | Mod: ,,, | Performed by: SURGERY

## 2023-07-05 PROCEDURE — G0179 PR HOME HEALTH MD RECERTIFICATION: ICD-10-PCS | Mod: ,,, | Performed by: SURGERY

## 2023-07-06 ENCOUNTER — HOSPITAL ENCOUNTER (OUTPATIENT)
Dept: RADIOLOGY | Facility: HOSPITAL | Age: 58
Discharge: HOME | End: 2023-07-06
Payer: MEDICARE

## 2023-07-06 ENCOUNTER — OFFICE VISIT (OUTPATIENT)
Dept: FAMILY MEDICINE | Facility: CLINIC | Age: 58
End: 2023-07-06
Payer: MEDICARE

## 2023-07-06 ENCOUNTER — PATIENT MESSAGE (OUTPATIENT)
Dept: FAMILY MEDICINE | Facility: CLINIC | Age: 58
End: 2023-07-06

## 2023-07-06 VITALS
OXYGEN SATURATION: 97 % | BODY MASS INDEX: 17.32 KG/M2 | SYSTOLIC BLOOD PRESSURE: 121 MMHG | HEIGHT: 64 IN | WEIGHT: 101.44 LBS | DIASTOLIC BLOOD PRESSURE: 60 MMHG | HEART RATE: 109 BPM | TEMPERATURE: 98 F

## 2023-07-06 DIAGNOSIS — Z95.810 ICD (IMPLANTABLE CARDIOVERTER-DEFIBRILLATOR) IN PLACE: ICD-10-CM

## 2023-07-06 DIAGNOSIS — R19.7 DIARRHEA, UNSPECIFIED TYPE: ICD-10-CM

## 2023-07-06 DIAGNOSIS — I70.0 AORTIC ATHEROSCLEROSIS: ICD-10-CM

## 2023-07-06 DIAGNOSIS — I10 ESSENTIAL HYPERTENSION: Primary | ICD-10-CM

## 2023-07-06 DIAGNOSIS — N18.6 ANEMIA IN ESRD (END-STAGE RENAL DISEASE): ICD-10-CM

## 2023-07-06 DIAGNOSIS — A49.8 CLOSTRIDIUM DIFFICILE INFECTION: ICD-10-CM

## 2023-07-06 DIAGNOSIS — D63.1 ANEMIA IN ESRD (END-STAGE RENAL DISEASE): ICD-10-CM

## 2023-07-06 PROCEDURE — 99999 PR PBB SHADOW E&M-EST. PATIENT-LVL V: CPT | Mod: PBBFAC,,,

## 2023-07-06 PROCEDURE — 99999 PR PBB SHADOW E&M-EST. PATIENT-LVL V: ICD-10-PCS | Mod: PBBFAC,,,

## 2023-07-06 PROCEDURE — 74018 XR ABDOMEN AP 1 VIEW: ICD-10-PCS | Mod: 26,,, | Performed by: RADIOLOGY

## 2023-07-06 PROCEDURE — 99214 PR OFFICE/OUTPT VISIT, EST, LEVL IV, 30-39 MIN: ICD-10-PCS | Mod: S$PBB,,,

## 2023-07-06 PROCEDURE — 74018 RADEX ABDOMEN 1 VIEW: CPT | Mod: TC,FY,PO

## 2023-07-06 PROCEDURE — 99215 OFFICE O/P EST HI 40 MIN: CPT | Mod: PBBFAC,PO

## 2023-07-06 PROCEDURE — 99214 OFFICE O/P EST MOD 30 MIN: CPT | Mod: S$PBB,,,

## 2023-07-06 PROCEDURE — 74018 RADEX ABDOMEN 1 VIEW: CPT | Mod: 26,,, | Performed by: RADIOLOGY

## 2023-07-06 NOTE — PROGRESS NOTES
"  HPI     Chief Complaint:  Chief Complaint   Patient presents with    Diarrhea     For about a month       Eva Bloom is a 57 y.o. female with multiple medical diagnoses as listed in the medical history and problem list that presents for diarrhea.  Pt is new to me. In clinic by herself.     HPI    Concerend for cdiff, history of. Dialysis pt Tues/Thurs/Saturday. Pt admits diarrhea x 1 month every day, approx 10 episodes per day. Usually watery, sometimes more formed. Admits foul smell of stool and its black (tarry). On Eliquis. Admits recent wt loss, now 101 lbs (2 months ago was 99lbs, seems stable). 15 lb wt loss since 12/2022. No BRB in stool. Pt admits not feeling well. Pt admits feeling fatigued and weak. Cachectic appearing. No recent fever or recent illness. Was on abx for "nose", unsure what it was but stopped approx 2 weeks ago. Pt states it was for an infection, pt admits heart was beating fast and she wasn't feel well so she stopped it. Does not produce urine.     Was tx for cdiff in October of last year. Mucous membranes moist and pink.       Assessment & Plan       Problem List Items Addressed This Visit          Cardiac/Vascular    Essential hypertension - Primary  The current medical regimen is effective;  continue present plan and medications.      ICD (implantable cardioverter-defibrillator) in place - SubQ  The current medical regimen is effective;  continue present plan and medications.      Aortic atherosclerosis  Not on statin.         ID    Clostridium difficile infection  Labs, xray today. Return stool sample. Referral to GI STAT placed.     Relevant Orders    CBC Auto Differential    H. pylori antigen, stool    Occult blood x 1, stool    WBC, Stool    Giardia / Cryptosporidum, EIA    Stool Exam-Ova,Cysts,Parasites    Clostridium difficile EIA    Stool culture    Rotavirus antigen, stool    Ambulatory referral/consult to Gastroenterology       Oncology    Anemia in ESRD (end-stage renal " disease)  Labs today. Reviewed labs from 1 month ago, stable. Dialysis Tues/Thurs/Sat. Dr. Hailey Holguin, nephrology.      Other Visit Diagnoses       Diarrhea, unspecified type      Labs and xray today. Stay well hydrated. Go to ED for any acute changes including severe weakness, chest pain, SOB, dizziness, bright red blood in stool.     Relevant Orders    CBC Auto Differential    H. pylori antigen, stool    Occult blood x 1, stool    WBC, Stool    Giardia / Cryptosporidum, EIA    Stool Exam-Ova,Cysts,Parasites    Clostridium difficile EIA    Stool culture    Rotavirus antigen, stool    Ambulatory referral/consult to Gastroenterology    BASIC METABOLIC PANEL            --------------------------------------------      Health Maintenance:  Health Maintenance         Date Due Completion Date    COVID-19 Vaccine (4 - Moderna series) 01/06/2022 11/11/2021    Influenza Vaccine (1) 09/01/2023 10/15/2022    Lipid Panel 10/22/2023 10/22/2022    Hemoglobin A1c 10/22/2023 10/22/2022    Cervical Cancer Screening 11/16/2023 11/16/2018    Mammogram 11/16/2023 11/16/2022    TETANUS VACCINE 09/23/2025 9/23/2015    Colorectal Cancer Screening 03/12/2028 3/12/2021    Pneumococcal Vaccines (Age 0-64) (4 - PPSV23 if available, else PCV20) 10/01/2029 11/9/2016              Follow Up:  Follow up if symptoms worsen or fail to improve go to ED for any worsening s/s.    Discussed DDx, condition, and treatment.   Education sent to patient portal/included in after visit summary.  ED precautions given.   Notify provider if symptoms do not resolve or increase in severity.   Patient verbalizes understanding and agrees with plan of care.      Exam     Review of Systems:  (as noted above)  Review of Systems   Constitutional:  Positive for appetite change (poor appetite x 1 month), fatigue and unexpected weight change. Negative for activity change, chills, diaphoresis and fever.   Eyes:  Negative for visual disturbance.   Gastrointestinal:   "Positive for diarrhea. Negative for abdominal distention, abdominal pain, anal bleeding, blood in stool, constipation, nausea, rectal pain and vomiting.   Genitourinary:  Positive for enuresis.   Skin:  Positive for rash (healing lesions to bilateral legs).   Neurological:  Positive for weakness. Negative for dizziness and light-headedness.   Hematological:  Negative for adenopathy. Does not bruise/bleed easily.     Physical Exam:   Physical Exam  Vitals reviewed.   Constitutional:       General: She is awake. She is not in acute distress.     Appearance: Normal appearance. She is cachectic. She is ill-appearing. She is not toxic-appearing.   HENT:      Head: Normocephalic and atraumatic.      Nose: Nose normal.      Mouth/Throat:      Lips: Pink.      Mouth: Mucous membranes are moist.   Cardiovascular:      Rate and Rhythm: Regular rhythm. Tachycardia present.      Heart sounds: Murmur heard.   Pulmonary:      Effort: Pulmonary effort is normal. No respiratory distress.      Breath sounds: Normal breath sounds. No wheezing.   Abdominal:      General: Abdomen is flat. Bowel sounds are increased. There is no distension or abdominal bruit.      Tenderness: There is no right CVA tenderness, left CVA tenderness or guarding.   Musculoskeletal:      Comments: In wheelchair   Skin:     General: Skin is warm and dry.      Capillary Refill: Capillary refill takes less than 2 seconds.      Coloration: Skin is not ashen, cyanotic, jaundiced or pale.   Neurological:      General: No focal deficit present.      Mental Status: She is alert, oriented to person, place, and time and easily aroused.   Psychiatric:         Mood and Affect: Mood normal.         Behavior: Behavior is cooperative.     Vitals:    07/06/23 1312   BP: 121/60   Pulse: 109   Temp: 98.1 °F (36.7 °C)   TempSrc: Oral   SpO2: 97%   Weight: 46 kg (101 lb 6.6 oz)   Height: 5' 4" (1.626 m)      Body mass index is 17.41 kg/m².        History     Past Medical " History:  Past Medical History:   Diagnosis Date    Anemia     Bronchitis 03/01/2017    Cancer 2016    kidney cancer    CHF (congestive heart failure), NYHA class II, chronic, systolic     CMV (cytomegalovirus) antibody positive     ESRD (end stage renal disease)     Essential hypertension 09/23/2015    H/O herpes simplex type 2 infection     Herpes simplex type 1 antibody positive     History of kidney cancer     s/p left nephrectomy 1/2016    Hyperparathyroidism, unspecified     Hyperuricemia without signs of inflammatory arthritis and tophaceous disease     Kidney stones     LGSIL (low grade squamous intraepithelial dysplasia)     Myocardiopathy 07/21/2017    Prediabetes     Proteinuria     Renal disorder     Thyroid nodule     Urate nephropathy        Past Surgical History:  Past Surgical History:   Procedure Laterality Date    BREAST CYST EXCISION      COLONOSCOPY N/A 11/12/2015    COLONOSCOPY N/A 3/12/2021    Procedure: COLONOSCOPY;  Surgeon: Brendon Lanier MD;  Location: [facility] ENDO;  Service: Endoscopy;  Laterality: N/A;  covid test 3/9, labs prior, prep instr mailed -ml    INSERTION OF BIVENTRICULAR IMPLANTABLE CARDIOVERTER-DEFIBRILLATOR (ICD)  04/2021    INSERTION OF TUNNELED CENTRAL VENOUS CATHETER (CVC) WITH SUBCUTANEOUS PORT N/A 1/25/2023    Procedure: INSERTION, DUAL LUMEN CATHETER WITH PORT, WITH IMAGING GUIDANCE;  Surgeon: FARIDEH Muñoz III, MD;  Location: [facility] OR [address] [place] [address];  Service: Peripheral Vascular;  Laterality: N/A;  possinble permcath placment    NEPHRECTOMY-LAPAROSCOPIC Left 01/12/2016    PERCUTANEOUS TRANSLUMINAL ANGIOPLASTY OF ARTERIOVENOUS FISTULA Left 4/19/2023    Procedure: PTA, AV FISTULA;  Surgeon: FARIDEH Muñoz III, MD;  Location: [facility] CATH LAB;  Service: Peripheral Vascular;  Laterality: Left;    PERITONEAL CATHETER INSERTION      Permacath insertion  01/12/2017    PLACEMENT OF ARTERIOVENOUS GRAFT  12/28/2022    Procedure: INSERTION, GRAFT, ARTERIOVENOUS;  Surgeon: FARIDEH CHEN  Toni SAM MD;  [facility];  Service: Peripheral Vascular;;    REMOVAL, GRAFT, ARTERIOVENOUS, UPPER EXTREMITY Left 1/25/2023    Procedure: REMOVAL, GRAFT, ARTERIOVENOUS, UPPER EXTREMITY;  Surgeon: FARIDEH Muñoz III, MD;  [facility];  Service: Peripheral Vascular;  Laterality: Left;    REVISION OF ARTERIOVENOUS FISTULA Left 5/1/2023    Procedure: REVISION, AV FISTULA;  Surgeon: FARIDEH Muñoz III, MD;  [facility];  Service: Peripheral Vascular;  Laterality: Left;  LUE AVG revision    REVISION OF PROCEDURE INVOLVING ARTERIOVENOUS GRAFT Left 12/28/2022    Procedure: REVISION, PROCEDURE INVOLVING ARTERIOVENOUS GRAFT;  Surgeon: FARIDEH Muñoz III, MD;  [facility];  Service: Peripheral Vascular;  Laterality: Left;    SALPINGOOPHORECTOMY Right 2016    KJB---DAVINCI    TONSILLECTOMY      TUBAL LIGATION         Social History:  Social History     Socioeconomic History    Marital status:     Number of children: 2   Occupational History    Occupation: [employer]     Employer: [id number]   Tobacco Use    Smoking status: Never    Smokeless tobacco: Never   Substance and Sexual Activity    Alcohol use: No     Comment: . 2 children. works at [employer].    Drug use: No    Sexual activity: Yes     Partners: Male       Family History:  Family History   Problem Relation Age of Onset    Hypertension Mother     Diabetes Father     Kidney disease Father     No Known Problems Sister     Heart disease Sister     No Known Problems Sister     No Known Problems Brother     No Known Problems Brother     Cataracts Maternal Aunt     No Known Problems Maternal Uncle     No Known Problems Paternal Aunt     No Known Problems Paternal Uncle     No Known Problems Maternal Grandmother     Diabetes Maternal Grandfather     No Known Problems Paternal Grandmother     No Known Problems Paternal Grandfather     No Known Problems Son     No Known Problems Son     No Known  Problems Other     Breast cancer Neg Hx     Colon cancer Neg Hx     Cancer Neg Hx     Stroke Neg Hx     Amblyopia Neg Hx     Blindness Neg Hx     Glaucoma Neg Hx     Macular degeneration Neg Hx     Retinal detachment Neg Hx     Strabismus Neg Hx     Thyroid disease Neg Hx        Allergies and Medications: (updated and reviewed)  Review of patient's allergies indicates:   Allergen Reactions    Coreg [carvedilol] Other (See Comments)     Nausea/vomiting    Allopurinol      Other reaction(s): abnormal transaminases     Current Outpatient Medications   Medication Sig Dispense Refill    acetaminophen (TYLENOL) 500 MG tablet Take 500 mg by mouth every 6 (six) hours as needed for Pain.      apixaban (ELIQUIS) 2.5 mg Tab Take 1 tablet (2.5 mg total) by mouth 2 (two) times daily. 60 tablet 11    cabozantinib (CABOMETYX) 60 mg Tab TAKE ONE TABLET BY MOUTH ONCE DAILY AT THE SAME TIME ON AN EMPTY STOMACH AT LEAST 1 HOUR BEFORE OR 2 HOURS AFTER EATING. AVOID GRAPEFRUIT PRODUCTS 30 tablet 4    cloNIDine 0.3 mg/24 hr td ptwk (CATAPRES) 0.3 mg/24 hr Place 1 patch onto the skin every 7 days. 4 patch 11    gabapentin (NEURONTIN) 100 MG capsule Take 1 capsule (100 mg total) by mouth once daily. 30 capsule 11    HYDROcodone-acetaminophen (NORCO) 5-325 mg per tablet Take 1 tablet by mouth every 6 (six) hours as needed for Pain. 15 tablet 0    levothyroxine (SYNTHROID) 75 MCG tablet Take 1 tablet (75 mcg total) by mouth once daily. 30 tablet 11    lidocaine-prilocaine (EMLA) cream APPLY ATLEAST 30 MINUTES BEFORE TREATMENT 3 TIMES A WEEK 30 g 11    metoprolol succinate (TOPROL-XL) 100 MG 24 hr tablet Take 1/2 tablet (50mg) once daily 90 tablet 3    mv,Ca,min-folic acid-vit K1 (ONE-A-DAY WOMEN'S 50 PLUS) 400-20 mcg Tab Take 1 tablet by mouth once daily.      naloxone (NARCAN) 1 mg/mL injection 2 mg (1 mg per nostril) by Nasal route as needed for opioid overdose; may repeat in 3 to 5 minutes if not effective. Call 911 2 mL 11     polyethylene glycol (GLYCOLAX) 17 gram/dose powder Take 17 g by mouth once daily. 235 g 0    promethazine (PHENERGAN) 25 MG tablet Take 1 tablet (25 mg total) by mouth every 6 (six) hours as needed for Nausea. 15 tablet 0    sacubitriL-valsartan (ENTRESTO) 49-51 mg per tablet Take 1 tablet by mouth 2 (two) times daily. 60 tablet 11    hydrALAZINE (APRESOLINE) 100 MG tablet Take 1 tablet (100 mg total) by mouth every 12 (twelve) hours. (Patient not taking: Reported on 5/24/2023) 180 tablet 2     No current facility-administered medications for this visit.     Facility-Administered Medications Ordered in Other Visits   Medication Dose Route Frequency Provider Last Rate Last Admin    0.9%  NaCl infusion   Intravenous Continuous Soni Bhatti MD           Patient Care Team:  Sowmya Mccain MD as PCP - General (Internal Medicine)  Ben Rivera MD as Consulting Physician (Hematology and Oncology)  Leora Louis MD (Cardiology)  Lulu Le LPN as Licensed Practical Nurse  Alexandra as Post Acute Care Provider (Dialysis Center)  Claude Allan MD as Consulting Physician (Nephrology)         - The patient is given an After Visit Summary that lists all medications with directions, allergies, education, orders placed during this encounter and follow-up instructions.      - I have reviewed the patient's medical information including past medical, family, and social history sections including the medications and allergies.      - We discussed the patient's current medications.     This note was created by combination of typed  and MModal dictation.  Transcription errors may be present.  If there are any questions, please contact me.

## 2023-07-06 NOTE — PATIENT INSTRUCTIONS
Labs and xray today    Stay well hydrated. Go to ED for any acute changes including severe weakness, chest pain, SOB, dizziness, bright red blood in stool.     Please return stool sample.     Referral placed to GI    //////////PHONE NUMBERS//////////    [facility]--[phone]  Imaging, Xray, CT, MRI, Ultrasound---[phone]  Bariatrics---[phone]  Breast Surgery---[phone]  Case Management---[phone]  Colonoscopy---[phone]  DME---[phone]  Infectious Disease---[phone]  Interventional Radiology---[phone]  Medical Records---[phone]  [facility] On [facility]---[phone]  Optometry/Ophthalmology---[phone]  [facility]---[phone]  Physical Therapy---[phone]  Psychiatry---[phone] or [phone]  Plastic Surgery---[phone]  Recovery--[phone] option 2, or [phone].  Sleep Study---[phone]  Smoking Cessation---[phone]  Wound Care---[phone]  Referral Desk---[phone]    /////////////////////////////////////////////////

## 2023-07-07 ENCOUNTER — TELEPHONE (OUTPATIENT)
Dept: FAMILY MEDICINE | Facility: CLINIC | Age: 58
End: 2023-07-07
Payer: MEDICARE

## 2023-07-08 ENCOUNTER — LAB VISIT (OUTPATIENT)
Dept: LAB | Facility: HOSPITAL | Age: 58
End: 2023-07-08
Attending: INTERNAL MEDICINE
Payer: MEDICARE

## 2023-07-08 DIAGNOSIS — R19.7 DIARRHEA, UNSPECIFIED TYPE: ICD-10-CM

## 2023-07-08 DIAGNOSIS — A49.8 CLOSTRIDIUM DIFFICILE INFECTION: ICD-10-CM

## 2023-07-08 PROCEDURE — 87449 NOS EACH ORGANISM AG IA: CPT

## 2023-07-08 PROCEDURE — 87427 SHIGA-LIKE TOXIN AG IA: CPT | Mod: 59

## 2023-07-08 PROCEDURE — 87329 GIARDIA AG IA: CPT

## 2023-07-08 PROCEDURE — 87046 STOOL CULTR AEROBIC BACT EA: CPT

## 2023-07-08 PROCEDURE — 87045 FECES CULTURE AEROBIC BACT: CPT

## 2023-07-08 PROCEDURE — 82272 OCCULT BLD FECES 1-3 TESTS: CPT

## 2023-07-08 PROCEDURE — 87449 NOS EACH ORGANISM AG IA: CPT | Mod: 91

## 2023-07-08 PROCEDURE — 87425 ROTAVIRUS AG IA: CPT

## 2023-07-08 PROCEDURE — 87338 HPYLORI STOOL AG IA: CPT

## 2023-07-08 PROCEDURE — 89055 LEUKOCYTE ASSESSMENT FECAL: CPT

## 2023-07-10 ENCOUNTER — HOSPITAL ENCOUNTER (OUTPATIENT)
Dept: RADIOLOGY | Facility: HOSPITAL | Age: 58
Discharge: HOME | End: 2023-07-10
Attending: INTERNAL MEDICINE
Payer: MEDICARE

## 2023-07-10 DIAGNOSIS — C64.9 METASTATIC RENAL CELL CARCINOMA, UNSPECIFIED LATERALITY: ICD-10-CM

## 2023-07-10 LAB
C DIFF GDH STL QL: NEGATIVE
C DIFF TOX A+B STL QL IA: NEGATIVE
OB PNL STL: POSITIVE
WBC #/AREA STL HPF: NORMAL /[HPF]

## 2023-07-10 PROCEDURE — 74176 CT ABD & PELVIS W/O CONTRAST: CPT | Mod: TC

## 2023-07-10 PROCEDURE — 71250 CT THORAX DX C-: CPT | Mod: 26,,, | Performed by: RADIOLOGY

## 2023-07-10 PROCEDURE — 74176 CT CHEST ABDOMEN PELVIS WITHOUT CONTRAST(XPD): ICD-10-PCS | Mod: 26,,, | Performed by: RADIOLOGY

## 2023-07-10 PROCEDURE — 71250 CT CHEST ABDOMEN PELVIS WITHOUT CONTRAST(XPD): ICD-10-PCS | Mod: 26,,, | Performed by: RADIOLOGY

## 2023-07-10 PROCEDURE — 74176 CT ABD & PELVIS W/O CONTRAST: CPT | Mod: 26,,, | Performed by: RADIOLOGY

## 2023-07-11 ENCOUNTER — PATIENT MESSAGE (OUTPATIENT)
Dept: INTERNAL MEDICINE | Facility: CLINIC | Age: 58
End: 2023-07-11
Payer: MEDICARE

## 2023-07-11 LAB
CRYPTOSP AG STL QL IA: POSITIVE
E COLI SXT1 STL QL IA: NEGATIVE
E COLI SXT2 STL QL IA: NEGATIVE
G LAMBLIA AG STL QL IA: NEGATIVE
RV AG STL QL IA.RAPID: NEGATIVE

## 2023-07-12 ENCOUNTER — OFFICE VISIT (OUTPATIENT)
Dept: HEMATOLOGY/ONCOLOGY | Facility: CLINIC | Age: 58
End: 2023-07-12
Payer: MEDICARE

## 2023-07-12 VITALS
SYSTOLIC BLOOD PRESSURE: 143 MMHG | RESPIRATION RATE: 18 BRPM | DIASTOLIC BLOOD PRESSURE: 71 MMHG | TEMPERATURE: 98 F | OXYGEN SATURATION: 99 % | HEIGHT: 64 IN | BODY MASS INDEX: 16.19 KG/M2 | WEIGHT: 94.81 LBS | HEART RATE: 72 BPM

## 2023-07-12 DIAGNOSIS — D64.9 ANEMIA, UNSPECIFIED TYPE: ICD-10-CM

## 2023-07-12 DIAGNOSIS — C64.9 METASTATIC RENAL CELL CARCINOMA, UNSPECIFIED LATERALITY: Primary | ICD-10-CM

## 2023-07-12 DIAGNOSIS — N18.6 ANEMIA DUE TO END STAGE RENAL DISEASE: ICD-10-CM

## 2023-07-12 DIAGNOSIS — C64.2 METASTATIC RENAL CELL CARCINOMA, LEFT: ICD-10-CM

## 2023-07-12 DIAGNOSIS — C64.9 METASTATIC RENAL CELL CARCINOMA TO INTRA-ABDOMINAL SITE: ICD-10-CM

## 2023-07-12 DIAGNOSIS — E03.9 HYPOTHYROIDISM, UNSPECIFIED TYPE: ICD-10-CM

## 2023-07-12 DIAGNOSIS — Z99.2 ESRD (END STAGE RENAL DISEASE) ON DIALYSIS: ICD-10-CM

## 2023-07-12 DIAGNOSIS — Z90.5 S/P NEPHRECTOMY: ICD-10-CM

## 2023-07-12 DIAGNOSIS — D63.1 ANEMIA DUE TO END STAGE RENAL DISEASE: ICD-10-CM

## 2023-07-12 DIAGNOSIS — N18.6 ESRD (END STAGE RENAL DISEASE) ON DIALYSIS: ICD-10-CM

## 2023-07-12 DIAGNOSIS — C79.89 METASTATIC RENAL CELL CARCINOMA TO INTRA-ABDOMINAL SITE: ICD-10-CM

## 2023-07-12 LAB — BACTERIA STL CULT: NORMAL

## 2023-07-12 PROCEDURE — 99999 PR PBB SHADOW E&M-EST. PATIENT-LVL III: CPT | Mod: PBBFAC,,, | Performed by: INTERNAL MEDICINE

## 2023-07-12 PROCEDURE — 99215 OFFICE O/P EST HI 40 MIN: CPT | Mod: S$PBB,,, | Performed by: INTERNAL MEDICINE

## 2023-07-12 PROCEDURE — 99215 PR OFFICE/OUTPT VISIT, EST, LEVL V, 40-54 MIN: ICD-10-PCS | Mod: S$PBB,,, | Performed by: INTERNAL MEDICINE

## 2023-07-12 PROCEDURE — 99213 OFFICE O/P EST LOW 20 MIN: CPT | Mod: PBBFAC | Performed by: INTERNAL MEDICINE

## 2023-07-12 PROCEDURE — 99999 PR PBB SHADOW E&M-EST. PATIENT-LVL III: ICD-10-PCS | Mod: PBBFAC,,, | Performed by: INTERNAL MEDICINE

## 2023-07-12 RX ORDER — CYCLOBENZAPRINE HCL 5 MG
5 TABLET ORAL DAILY PRN
Qty: 30 TABLET | Refills: 1 | Status: SHIPPED | OUTPATIENT
Start: 2023-07-12 | End: 2023-09-13 | Stop reason: SDUPTHER

## 2023-07-12 NOTE — PROGRESS NOTES
"  Subjective:       Patient ID: Eva Bloom is a 57 y.o. female.    Chief Complaint: RCC    HPI     57 y.o.female, with metastatic RCC, here for f/u.  Recent HD graft revision.       Pt is currently taking cabozantinib daily since December 2016. Pt is on ESRD with HD every TTS.     Has had some weight loss.  Also notes diarrhea.  Recently stopped taking Synthroid to see if it would help with the diarrhea.      Also notes some leg cramping.     Today, she denies any mouth sores, nausea, vomiting, constipation, chest pain, leg swelling, headache, dizziness, or mood changes.    ECOG 1. She is accompanied with her .    Oncologic History (From Chart and Patient):  Eva Bloom is a 57 y.o.female with ESRD on HD who was found to have two L renal masses. She underwent L lap nephrectomy on 1/12/16. 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 Margins were negative.  Shehealed from surgery well, but then developed a large ovarian mass.  This was removed by gyn along with multiple sites of metastatic disease in the pelvis.  Path was c/w recurrence of RCC.     11/20/16 Pelvic ultrasound reveals "Large heterogeneous cystic and solid mass which appears to arise from the right ovary with worrisome imaging features for malignancy.  Differential diagnosis includes malignant tumors such as serous or mucinous cystadenocarcinoma.  It is important to note that the patient is at risk for ovarian torsion due to the size of the mass.Multiple uterine fibroids are identified with the largest in the uterine fundus."    11/22/16 pathology reveals "FINAL PATHOLOGIC DIAGNOSIS-RIGHT OVARY AND FALLOPIAN TUBE, RIGHT SALPINGO-OOPHORECTOMY:  -Positive for malignancy, high grade carcinoma morphologically and immunohistochemically consistent with metastasis from the patient's known renal cell carcinoma"    Review of " Systems   Constitutional:  Positive for fatigue. Negative for activity change, appetite change, chills and fever.   HENT:  Negative for congestion, hearing loss, mouth sores, postnasal drip, sore throat, tinnitus and voice change.    Eyes:  Negative for pain and visual disturbance.   Respiratory:  Negative for cough, shortness of breath and wheezing.    Cardiovascular:  Negative for chest pain, palpitations and leg swelling.   Gastrointestinal:  Negative for abdominal pain, constipation, diarrhea, nausea and vomiting.   Endocrine: Negative for cold intolerance and heat intolerance.   Genitourinary:  Negative for difficulty urinating, dyspareunia, dysuria, frequency, menstrual problem, urgency, vaginal bleeding, vaginal discharge and vaginal pain.   Musculoskeletal:  Negative for arthralgias and myalgias.   Skin:  Negative for color change, rash and wound.   Allergic/Immunologic: Negative for environmental allergies and food allergies.   Neurological:  Negative for weakness, numbness and headaches.   Hematological:  Negative for adenopathy. Does not bruise/bleed easily.   Psychiatric/Behavioral:  Negative for agitation, confusion, hallucinations and sleep disturbance. The patient is not nervous/anxious.    All other systems reviewed and are negative.    Allergies:  Review of patient's allergies indicates:   Allergen Reactions    Allopurinol      Other reaction(s): abnormal transaminases       Medications:  Current Outpatient Medications   Medication Sig Dispense Refill    acetaminophen (TYLENOL) 500 MG tablet Take 500 mg by mouth every 6 (six) hours as needed for Pain.      apixaban (ELIQUIS) 2.5 mg Tab Take 1 tablet (2.5 mg total) by mouth 2 (two) times daily. 60 tablet 11    cabozantinib (CABOMETYX) 60 mg Tab TAKE ONE TABLET BY MOUTH ONCE DAILY AT THE SAME TIME ON AN EMPTY STOMACH AT LEAST 1 HOUR BEFORE OR 2 HOURS AFTER EATING. AVOID GRAPEFRUIT PRODUCTS 30 tablet 4    cloNIDine 0.3 mg/24 hr td ptwk (CATAPRES) 0.3  mg/24 hr Place 1 patch onto the skin every 7 days. 4 patch 11    gabapentin (NEURONTIN) 100 MG capsule Take 1 capsule (100 mg total) by mouth once daily. 30 capsule 11    hydrALAZINE (APRESOLINE) 100 MG tablet Take 1 tablet (100 mg total) by mouth every 12 (twelve) hours. (Patient not taking: Reported on 5/24/2023) 180 tablet 2    HYDROcodone-acetaminophen (NORCO) 5-325 mg per tablet Take 1 tablet by mouth every 6 (six) hours as needed for Pain. 15 tablet 0    levothyroxine (SYNTHROID) 75 MCG tablet Take 1 tablet (75 mcg total) by mouth once daily. 30 tablet 11    lidocaine-prilocaine (EMLA) cream APPLY ATLEAST 30 MINUTES BEFORE TREATMENT 3 TIMES A WEEK 30 g 11    metoprolol succinate (TOPROL-XL) 100 MG 24 hr tablet Take 1/2 tablet (50mg) once daily 90 tablet 3    mv,Ca,min-folic acid-vit K1 (ONE-A-DAY WOMEN'S 50 PLUS) 400-20 mcg Tab Take 1 tablet by mouth once daily.      naloxone (NARCAN) 1 mg/mL injection 2 mg (1 mg per nostril) by Nasal route as needed for opioid overdose; may repeat in 3 to 5 minutes if not effective. Call 911 2 mL 11    polyethylene glycol (GLYCOLAX) 17 gram/dose powder Take 17 g by mouth once daily. 235 g 0    promethazine (PHENERGAN) 25 MG tablet Take 1 tablet (25 mg total) by mouth every 6 (six) hours as needed for Nausea. 15 tablet 0    sacubitriL-valsartan (ENTRESTO) 49-51 mg per tablet Take 1 tablet by mouth 2 (two) times daily. 60 tablet 11     No current facility-administered medications for this visit.     Facility-Administered Medications Ordered in Other Visits   Medication Dose Route Frequency Provider Last Rate Last Admin    0.9%  NaCl infusion   Intravenous Continuous Soni Bhatti MD           [facility]:  Past Medical History:   Diagnosis Date    Anemia     Bronchitis 03/01/2017    Cancer 2016    kidney cancer    CHF (congestive heart failure), NYHA class II, chronic, systolic     CMV (cytomegalovirus) antibody positive     ESRD (end stage renal disease)     Essential hypertension  09/23/2015    H/O herpes simplex type 2 infection     Herpes simplex type 1 antibody positive     History of kidney cancer     s/p left nephrectomy 1/2016    Hyperparathyroidism, unspecified     Hyperuricemia without signs of inflammatory arthritis and tophaceous disease     Kidney stones     LGSIL (low grade squamous intraepithelial dysplasia)     Myocardiopathy 07/21/2017    Prediabetes     Proteinuria     Renal disorder     Thyroid nodule     Urate nephropathy        PSH:  Past Surgical History:   Procedure Laterality Date    BREAST CYST EXCISION      COLONOSCOPY N/A 11/12/2015    COLONOSCOPY N/A 3/12/2021    Procedure: COLONOSCOPY;  Surgeon: Brendon Lanier MD;  Location: [facility] ENDO;  Service: Endoscopy;  Laterality: N/A;  covid test 3/9, labs prior, prep instr mailed -ml    INSERTION OF BIVENTRICULAR IMPLANTABLE CARDIOVERTER-DEFIBRILLATOR (ICD)  04/2021    INSERTION OF TUNNELED CENTRAL VENOUS CATHETER (CVC) WITH SUBCUTANEOUS PORT N/A 1/25/2023    Procedure: INSERTION, DUAL LUMEN CATHETER WITH PORT, WITH IMAGING GUIDANCE;  Surgeon: FARIDEH Muñoz III, MD;  Location: [facility] OR [address] [place] [address];  Service: Peripheral Vascular;  Laterality: N/A;  possinble permcath placment    NEPHRECTOMY-LAPAROSCOPIC Left 01/12/2016    PERCUTANEOUS TRANSLUMINAL ANGIOPLASTY OF ARTERIOVENOUS FISTULA Left 4/19/2023    Procedure: PTA, AV FISTULA;  Surgeon: FARIDEH Muñoz III, MD;  Location: [facility] CATH LAB;  Service: Peripheral Vascular;  Laterality: Left;    PERITONEAL CATHETER INSERTION      Permacath insertion  01/12/2017    PLACEMENT OF ARTERIOVENOUS GRAFT  12/28/2022    Procedure: INSERTION, GRAFT, ARTERIOVENOUS;  Surgeon: FARIDEH Muñoz III, MD;  [facility];  Service: Peripheral Vascular;;    REMOVAL, GRAFT, ARTERIOVENOUS, UPPER EXTREMITY Left 1/25/2023    Procedure: REMOVAL, GRAFT, ARTERIOVENOUS, UPPER EXTREMITY;  Surgeon: FARIDEH Muñoz III, MD;  Location: [facility] OR [facility];  Service: Peripheral Vascular;  Laterality:  Left;    REVISION OF ARTERIOVENOUS FISTULA Left 5/1/2023    Procedure: REVISION, AV FISTULA;  Surgeon: FARIDEH Muñoz III, MD;  Location: [facility] OR 2ND FLR;  Service: Peripheral Vascular;  Laterality: Left;  LUE AVG revision    REVISION OF PROCEDURE INVOLVING ARTERIOVENOUS GRAFT Left 12/28/2022    Procedure: REVISION, PROCEDURE INVOLVING ARTERIOVENOUS GRAFT;  Surgeon: FARIDEH Muñoz III, MD;  Location: [facility] OR 2ND FLR;  Service: Peripheral Vascular;  Laterality: Left;    SALPINGOOPHORECTOMY Right 2016    KJB---DAVINCI    TONSILLECTOMY      TUBAL LIGATION         FamHx:  Family History   Problem Relation Age of Onset    Hypertension Mother     Diabetes Father     Kidney disease Father     No Known Problems Sister     Heart disease Sister     No Known Problems Sister     No Known Problems Brother     No Known Problems Brother     Cataracts Maternal Aunt     No Known Problems Maternal Uncle     No Known Problems Paternal Aunt     No Known Problems Paternal Uncle     No Known Problems Maternal Grandmother     Diabetes Maternal Grandfather     No Known Problems Paternal Grandmother     No Known Problems Paternal Grandfather     No Known Problems Son     No Known Problems Son     No Known Problems Other     Breast cancer Neg Hx     Colon cancer Neg Hx     Cancer Neg Hx     Stroke Neg Hx     Amblyopia Neg Hx     Blindness Neg Hx     Glaucoma Neg Hx     Macular degeneration Neg Hx     Retinal detachment Neg Hx     Strabismus Neg Hx     Thyroid disease Neg Hx        SocHx:  Social History     Socioeconomic History    Marital status:     Number of children: 2   Occupational History    Occupation: lucero     Employer: [id number]   Tobacco Use    Smoking status: Never    Smokeless tobacco: Never   Substance and Sexual Activity    Alcohol use: No     Comment: . 2 children. works at [employer].    Drug use: No    Sexual activity: Yes     Partners: Male         Objective:     Vitals:    07/12/23 0926   BP: (!)  143/71   Pulse: 72   Resp: 18   Temp: 97.9 °F (36.6 °C)         Physical Exam  Vitals and nursing note reviewed.   Constitutional:       General: She is not in acute distress.     Appearance: She is well-developed.      Comments: Thin appearance   HENT:      Head: Normocephalic and atraumatic.      Nose: Nose normal.      Mouth/Throat:      Pharynx: No oropharyngeal exudate.   Eyes:      General:         Right eye: No discharge.         Left eye: No discharge.      Conjunctiva/sclera: Conjunctivae normal.      Pupils: Pupils are equal, round, and reactive to light.   Neck:      Trachea: No tracheal deviation.   Cardiovascular:      Comments: No swelling to bilateral lower extremities.   Musculoskeletal:         General: No tenderness. Normal range of motion.      Comments:      Skin:     General: Skin is warm and dry.      Coloration: Skin is not pale.      Findings: No erythema or rash.   Neurological:      Mental Status: She is alert and oriented to person, place, and time.   Psychiatric:         Behavior: Behavior normal.         Thought Content: Thought content normal.      LABS:  WBC   Date Value Ref Range Status   07/10/2023 3.87 (L) 3.90 - 12.70 K/uL Final     Hemoglobin   Date Value Ref Range Status   07/10/2023 8.8 (L) 12.0 - 16.0 g/dL Final     Hematocrit   Date Value Ref Range Status   07/10/2023 28.6 (L) 37.0 - 48.5 % Final     Platelets   Date Value Ref Range Status   07/10/2023 126 (L) 150 - 450 K/uL Final       Chemistry        Component Value Date/Time     07/10/2023 0813    K 3.6 07/10/2023 0813     07/10/2023 0813    CO2 23 07/10/2023 0813    BUN 51 (H) 07/10/2023 0813    CREATININE 5.6 (H) 07/10/2023 0813    GLU 97 07/10/2023 0813        Component Value Date/Time    CALCIUM 9.2 07/10/2023 0813    ALKPHOS 280 (H) 07/10/2023 0813    AST 69 (H) 07/10/2023 0813    ALT 48 (H) 07/10/2023 0813    BILITOT 0.6 07/10/2023 0813        CT Chest Abdomen Pelvis Without Contrast (XPD)  Narrative:  EXAMINATION:  CT CHEST ABDOMEN PELVIS WITHOUT CONTRAST(XPD)    CLINICAL HISTORY:  Kidney cancer, recurrence; Malignant neoplasm of unspecified kidney, except renal pelvis    TECHNIQUE:  Low dose axial images, sagittal and coronal reformations were obtained from the thoracic inlet to the pubic symphysis without the use of IV or oral contrast, severely limiting evaluation of soft tissue structures, vascular structures and hollow viscus organs especially in the setting of evaluation for potential metastatic disease.    COMPARISON:  CT chest/abdomen/pelvis 03/27/2023    FINDINGS:  Chest:    Base of Neck: Multiple hypodense and calcified thyroid nodules in the bilateral thyroid lobes for which dedicated thyroid ultrasound may be obtained on a nonemergent, outpatient basis as clinically indicated.    Aorta: 3-branch vessel configuration of a left-sided type 1 aortic arch.  No hyperdense crescent sign, aneurysmal degeneration, periaortic fat stranding or periaortic fluid.    Heart: Moderate-to-severe cardiomegaly, not significantly changed.  Pericardial thickening measuring simple fluid density adjacent to the right atrium and up to 1.4-cm in maximal diameter.  Moderate coronary artery calcifications.    Pulmonary vasculature: Pulmonary arteries are not enlarged and distribute normally.  There are four pulmonary veins.    Axilla/Dahiana/Mediastinum: No axillary or mediastinal lymphadenopathy.  Evaluation of hilar lymph nodes is limited without IV contrast.    Airways: Trachea and mainstem bronchi are patent.    Lungs/Pleura: 0.6-cm solid nodule in the right lower lobe (4:217), unchanged.  0.4-cm solid nodule in the right lower lobe (4:254), not significantly changed.  Persistent clustered micro nodules with tree-in-bud configuration in the anterior right upper lobe, not significantly changed.    Abdomen/pelvis:    Hepatobiliary: 1.8-cm hepatic parenchymal hypodensity is poorly characterized on current nonenhanced exam but not  significantly changed when compared to prior.  No appreciable new or enlarging hepatic parenchymal nodules.  A few radiodense gallbladder stones, not significantly changed.  No appreciable significant mural thickening, pericholecystic fluid or pericholecystic fat stranding on this significantly limited exam..  No biliary dilatation.    Pancreas: Pancreas is normal in size and morphology without appreciable surrounding fat stranding, sizable fluid or sizable suspicious mass on this partially limited non-enhanced exam.    Spleen: Spleen is normal in size and morphology without appreciable sizable parenchymal mass on this non-enhanced exam.    Adrenals: Adrenal glands are symmetric and normal in size and morphology without appreciable nodularity.    Kidneys: Severe right renal cortical atrophy, not significantly changed.  Multiple simple appearing right renal cortical cystic lesions are not significantly changed.  Status post left nephrectomy with soft tissue nodular opacity in the nephrectomy bed (2: 102-114), not significantly changed.    Bladder: Urinary bladder is completely collapsed, limiting evaluation.    GI Tract: The unenhanced and unopacified stomach, small bowel and colon are poorly visualized and assessed given the lack of IV and oral contrast as well as extreme paucity of intraperitoneal bowel fat however, appears grossly stable.  No evidence of small-bowel obstruction, significant fat stranding, significant free fluid or free air.  The appendix is not visualized    Mesentery: Extreme paucity of intraperitoneal bowel fat limits evaluation of intra-abdominal in intra pelvic contents.    Retroperitoneum: Aorta is normal in course and caliber without evidence of aneurysmal degeneration.  No sizable suspicious retroperitoneal mass, fluid collection or lymphadenopathy.    Pelvis: Reproductive organs are grossly unremarkable.  No appreciable sizable pelvic mass on this partially limited nonenhanced exam.  No  over pelvic lymphadenopathy.    Soft tissues: Imaged/visualized soft tissues are grossly unremarkable.    Osseous structures: Mild multilevel degenerative changes of the imaged spine.  No acute displaced fracture, dislocation or suspicious lytic/blastic osseous lesions.  Impression: 1. Postoperative changes of total left nephrectomy with persistent soft tissue nodular opacity in the left nephrectomy bed, not significantly changed.  Further assessment of this lesion is markedly limited given the lack of multiphase IV contrast enhanced imaging.  2. 1.8-cm right hepatic dome hypodensity is poorly characterized on current nonenhanced exam but not significantly changed when compared to prior.  No appreciable new or enlarging hepatic parenchymal nodules.  Two subcentimeter solid nodules in the right lower lobe, not significantly changed.    Electronically signed by: Pan Win  Date:    07/10/2023  Time:    13:31      Assessment:       1. Metastatic renal cell carcinoma, unspecified laterality    2. Hypothyroidism, unspecified type    3. ESRD (end stage renal disease) on dialysis    4. Metastatic renal cell carcinoma to intra-abdominal site    5. Metastatic renal cell carcinoma, left    6. Anemia due to end stage renal disease    7. Anemia, unspecified type    8. S/p nephrectomy left            Plan:       Metastatic Renal Cell Carcinoma:    It is unlikely that surgery removed all sites of metastatic disease. Given that this is c/w her previous papillary disease, pt started on oral cabozantinib 60mg daily (for dual VEGF and MET inhibition), especially given data that shows cabo is far superior to Sutent in the first line setting.  If pt progresses on this regimen, will switch to nivolumab.     Labs largely stable.  Pt has been holding Synthroid due to diarrhea, her TSH is up, advised that she go back on it.      Scans stable.      Given diarrhea as above, will hold Cabo x1 week to see if this improves.  If so, may  consider dropping dose of Cabo.       RTC in 8 wks with labs (CBC,CMP, TSH), and to see Beth.  ALL appts MUST be on a Monday, Wednesday, or Friday ONLY (pt has dialysis on Tuesday and Thursday).  Pt prefers early morning appts.       Patient is in agreement with the proposed treatment plan. All questions were answered to the patient's satisfaction. Pt knows to call clinic if anything is needed before the next clinic visit.     More than 40 mins total time were spent during this encounter.      Ben Rivera M.D., M.S., F.A.C.P.  Hematology/Oncology Attending  [facility]           Route Chart for Scheduling    Med Onc Chart Routing      Follow up with physician    Follow up with MARTA 2 months. RTC in 8 wks with labs (CBC,CMP, TSH), and to see Beth.  ALL appts MUST be on a Monday, Wednesday, or Friday ONLY (pt has dialysis on Tuesday and Thursday).  Pt prefers early morning appts.   Infusion scheduling note    Injection scheduling note    Labs CBC, CMP and TSH   Scheduling:  Preferred lab:  Lab interval:     Imaging    Pharmacy appointment    Other referrals

## 2023-07-17 LAB
H PYLORI AG STL QL IA: NOT DETECTED
SPECIMEN SOURCE: NORMAL

## 2023-07-19 ENCOUNTER — HOSPITAL ENCOUNTER (INPATIENT)
Facility: HOSPITAL | Age: 58
LOS: 9 days | Discharge: HOME | DRG: 252 | End: 2023-07-29
Attending: EMERGENCY MEDICINE | Admitting: HOSPITALIST
Payer: MEDICARE

## 2023-07-19 ENCOUNTER — TELEPHONE (OUTPATIENT)
Dept: FAMILY MEDICINE | Facility: CLINIC | Age: 58
End: 2023-07-19
Payer: MEDICARE

## 2023-07-19 DIAGNOSIS — L97.529: ICD-10-CM

## 2023-07-19 DIAGNOSIS — N18.9 CHRONIC KIDNEY DISEASE-MINERAL AND BONE DISORDER: ICD-10-CM

## 2023-07-19 DIAGNOSIS — R60.0 BILATERAL LEG EDEMA: ICD-10-CM

## 2023-07-19 DIAGNOSIS — C64.9 METASTATIC RENAL CELL CARCINOMA, UNSPECIFIED LATERALITY: ICD-10-CM

## 2023-07-19 DIAGNOSIS — N18.6 END-STAGE RENAL DISEASE ON HEMODIALYSIS: ICD-10-CM

## 2023-07-19 DIAGNOSIS — Z85.528 HISTORY OF KIDNEY CANCER: Primary | ICD-10-CM

## 2023-07-19 DIAGNOSIS — T82.7XXA ARTERIOVENOUS GRAFT INFECTION, INITIAL ENCOUNTER: ICD-10-CM

## 2023-07-19 DIAGNOSIS — L97.509 TOE ULCER: ICD-10-CM

## 2023-07-19 DIAGNOSIS — M89.9 CHRONIC KIDNEY DISEASE-MINERAL AND BONE DISORDER: ICD-10-CM

## 2023-07-19 DIAGNOSIS — R52 PAIN: ICD-10-CM

## 2023-07-19 DIAGNOSIS — L98.491 SKIN ULCER, LIMITED TO BREAKDOWN OF SKIN: ICD-10-CM

## 2023-07-19 DIAGNOSIS — D64.9 SEVERE ANEMIA: ICD-10-CM

## 2023-07-19 DIAGNOSIS — I70.262 CRITICAL LIMB ISCHEMIA OF LEFT LOWER EXTREMITY WITH GANGRENE: ICD-10-CM

## 2023-07-19 DIAGNOSIS — R07.9 CHEST PAIN: ICD-10-CM

## 2023-07-19 DIAGNOSIS — E83.9 CHRONIC KIDNEY DISEASE-MINERAL AND BONE DISORDER: ICD-10-CM

## 2023-07-19 DIAGNOSIS — Z99.2 END-STAGE RENAL DISEASE ON HEMODIALYSIS: ICD-10-CM

## 2023-07-19 PROBLEM — E03.9 HYPOTHYROIDISM: Status: ACTIVE | Noted: 2023-07-19

## 2023-07-19 LAB
ABO + RH BLD: NORMAL
ALBUMIN SERPL BCP-MCNC: 2.4 G/DL (ref 3.5–5.2)
ALLENS TEST: NORMAL
ALP SERPL-CCNC: 315 U/L (ref 55–135)
ALT SERPL W/O P-5'-P-CCNC: 40 U/L (ref 10–44)
ANION GAP SERPL CALC-SCNC: 13 MMOL/L (ref 8–16)
AST SERPL-CCNC: 65 U/L (ref 10–40)
BASOPHILS # BLD AUTO: 0.04 K/UL (ref 0–0.2)
BASOPHILS NFR BLD: 0.6 % (ref 0–1.9)
BILIRUB SERPL-MCNC: 0.7 MG/DL (ref 0.1–1)
BLD GP AB SCN CELLS X3 SERPL QL: NORMAL
BLD PROD TYP BPU: NORMAL
BLOOD UNIT EXPIRATION DATE: NORMAL
BLOOD UNIT TYPE CODE: 6200
BLOOD UNIT TYPE: NORMAL
BUN SERPL-MCNC: 65 MG/DL (ref 6–20)
CALCIUM SERPL-MCNC: 8.8 MG/DL (ref 8.7–10.5)
CHLORIDE SERPL-SCNC: 107 MMOL/L (ref 95–110)
CO2 SERPL-SCNC: 23 MMOL/L (ref 23–29)
CODING SYSTEM: NORMAL
CREAT SERPL-MCNC: 4.8 MG/DL (ref 0.5–1.4)
CROSSMATCH INTERPRETATION: NORMAL
DELSYS: NORMAL
DIFFERENTIAL METHOD: ABNORMAL
DISPENSE STATUS: NORMAL
EOSINOPHIL # BLD AUTO: 0.2 K/UL (ref 0–0.5)
EOSINOPHIL NFR BLD: 2.7 % (ref 0–8)
ERYTHROCYTE [DISTWIDTH] IN BLOOD BY AUTOMATED COUNT: 19.7 % (ref 11.5–14.5)
EST. GFR  (NO RACE VARIABLE): 10 ML/MIN/1.73 M^2
FERRITIN SERPL-MCNC: 948 NG/ML (ref 20–300)
GLUCOSE SERPL-MCNC: 93 MG/DL (ref 70–110)
HCT VFR BLD AUTO: 19.8 % (ref 37–48.5)
HGB BLD-MCNC: 6.2 G/DL (ref 12–16)
IMM GRANULOCYTES # BLD AUTO: 0.05 K/UL (ref 0–0.04)
IMM GRANULOCYTES NFR BLD AUTO: 0.8 % (ref 0–0.5)
IRON SERPL-MCNC: 94 UG/DL (ref 30–160)
LACTATE SERPL-SCNC: 1.6 MMOL/L (ref 0.5–2.2)
LACTATE SERPL-SCNC: 2 MMOL/L (ref 0.5–2.2)
LDH SERPL L TO P-CCNC: 1.44 MMOL/L (ref 0.5–2.2)
LYMPHOCYTES # BLD AUTO: 1.3 K/UL (ref 1–4.8)
LYMPHOCYTES NFR BLD: 20.6 % (ref 18–48)
MCH RBC QN AUTO: 31.6 PG (ref 27–31)
MCHC RBC AUTO-ENTMCNC: 31.3 G/DL (ref 32–36)
MCV RBC AUTO: 101 FL (ref 82–98)
MODE: NORMAL
MONOCYTES # BLD AUTO: 1 K/UL (ref 0.3–1)
MONOCYTES NFR BLD: 15.9 % (ref 4–15)
NEUTROPHILS # BLD AUTO: 3.8 K/UL (ref 1.8–7.7)
NEUTROPHILS NFR BLD: 59.4 % (ref 38–73)
NRBC BLD-RTO: 0 /100 WBC
PLATELET # BLD AUTO: 177 K/UL (ref 150–450)
PMV BLD AUTO: 10.5 FL (ref 9.2–12.9)
POTASSIUM SERPL-SCNC: 4.3 MMOL/L (ref 3.5–5.1)
PROT SERPL-MCNC: 5.9 G/DL (ref 6–8.4)
RBC # BLD AUTO: 1.96 M/UL (ref 4–5.4)
SAMPLE: NORMAL
SATURATED IRON: 42 % (ref 20–50)
SITE: NORMAL
SODIUM SERPL-SCNC: 143 MMOL/L (ref 136–145)
SPECIMEN OUTDATE: NORMAL
TOTAL IRON BINDING CAPACITY: 226 UG/DL (ref 250–450)
TRANS ERYTHROCYTES VOL PATIENT: NORMAL ML
TRANSFERRIN SERPL-MCNC: 153 MG/DL (ref 200–375)
WBC # BLD AUTO: 6.36 K/UL (ref 3.9–12.7)

## 2023-07-19 PROCEDURE — 84466 ASSAY OF TRANSFERRIN: CPT | Performed by: EMERGENCY MEDICINE

## 2023-07-19 PROCEDURE — 96365 THER/PROPH/DIAG IV INF INIT: CPT

## 2023-07-19 PROCEDURE — G0378 HOSPITAL OBSERVATION PER HR: HCPCS

## 2023-07-19 PROCEDURE — 63600175 PHARM REV CODE 636 W HCPCS: Performed by: PHYSICIAN ASSISTANT

## 2023-07-19 PROCEDURE — 99900035 HC TECH TIME PER 15 MIN (STAT)

## 2023-07-19 PROCEDURE — 96361 HYDRATE IV INFUSION ADD-ON: CPT

## 2023-07-19 PROCEDURE — 96366 THER/PROPH/DIAG IV INF ADDON: CPT

## 2023-07-19 PROCEDURE — 25000003 PHARM REV CODE 250: Performed by: PHYSICIAN ASSISTANT

## 2023-07-19 PROCEDURE — 86900 BLOOD TYPING SEROLOGIC ABO: CPT | Performed by: EMERGENCY MEDICINE

## 2023-07-19 PROCEDURE — 25000003 PHARM REV CODE 250: Performed by: EMERGENCY MEDICINE

## 2023-07-19 PROCEDURE — 96367 TX/PROPH/DG ADDL SEQ IV INF: CPT

## 2023-07-19 PROCEDURE — 27201040 HC RC 50 FILTER: Mod: 91 | Performed by: EMERGENCY MEDICINE

## 2023-07-19 PROCEDURE — 63600175 PHARM REV CODE 636 W HCPCS: Performed by: EMERGENCY MEDICINE

## 2023-07-19 PROCEDURE — 25000003 PHARM REV CODE 250: Performed by: INTERNAL MEDICINE

## 2023-07-19 PROCEDURE — 36430 TRANSFUSION BLD/BLD COMPNT: CPT

## 2023-07-19 PROCEDURE — 85025 COMPLETE CBC W/AUTO DIFF WBC: CPT | Performed by: EMERGENCY MEDICINE

## 2023-07-19 PROCEDURE — 82728 ASSAY OF FERRITIN: CPT | Performed by: EMERGENCY MEDICINE

## 2023-07-19 PROCEDURE — 83605 ASSAY OF LACTIC ACID: CPT | Performed by: EMERGENCY MEDICINE

## 2023-07-19 PROCEDURE — 99291 CRITICAL CARE FIRST HOUR: CPT

## 2023-07-19 PROCEDURE — 83605 ASSAY OF LACTIC ACID: CPT

## 2023-07-19 PROCEDURE — P9021 RED BLOOD CELLS UNIT: HCPCS | Performed by: EMERGENCY MEDICINE

## 2023-07-19 PROCEDURE — 87040 BLOOD CULTURE FOR BACTERIA: CPT | Performed by: EMERGENCY MEDICINE

## 2023-07-19 PROCEDURE — 80053 COMPREHEN METABOLIC PANEL: CPT | Performed by: EMERGENCY MEDICINE

## 2023-07-19 PROCEDURE — 86920 COMPATIBILITY TEST SPIN: CPT | Performed by: SURGERY

## 2023-07-19 PROCEDURE — 86920 COMPATIBILITY TEST SPIN: CPT | Performed by: EMERGENCY MEDICINE

## 2023-07-19 RX ORDER — TALC
6 POWDER (GRAM) TOPICAL NIGHTLY PRN
Status: DISCONTINUED | OUTPATIENT
Start: 2023-07-19 | End: 2023-07-29 | Stop reason: HOSPADM

## 2023-07-19 RX ORDER — METOPROLOL SUCCINATE 50 MG/1
50 TABLET, EXTENDED RELEASE ORAL DAILY
Status: DISCONTINUED | OUTPATIENT
Start: 2023-07-20 | End: 2023-07-29 | Stop reason: HOSPADM

## 2023-07-19 RX ORDER — LANOLIN ALCOHOL/MO/W.PET/CERES
800 CREAM (GRAM) TOPICAL
Status: DISCONTINUED | OUTPATIENT
Start: 2023-07-19 | End: 2023-07-29 | Stop reason: HOSPADM

## 2023-07-19 RX ORDER — MUPIROCIN 20 MG/G
OINTMENT TOPICAL 2 TIMES DAILY
Status: DISPENSED | OUTPATIENT
Start: 2023-07-19 | End: 2023-07-24

## 2023-07-19 RX ORDER — NALOXONE HCL 0.4 MG/ML
0.02 VIAL (ML) INJECTION
Status: DISCONTINUED | OUTPATIENT
Start: 2023-07-19 | End: 2023-07-29 | Stop reason: HOSPADM

## 2023-07-19 RX ORDER — SODIUM CHLORIDE 9 MG/ML
INJECTION, SOLUTION INTRAVENOUS
Status: DISCONTINUED | OUTPATIENT
Start: 2023-07-19 | End: 2023-07-29 | Stop reason: HOSPADM

## 2023-07-19 RX ORDER — SODIUM CHLORIDE 0.9 % (FLUSH) 0.9 %
10 SYRINGE (ML) INJECTION
Status: DISCONTINUED | OUTPATIENT
Start: 2023-07-19 | End: 2023-07-29 | Stop reason: HOSPADM

## 2023-07-19 RX ORDER — GLUCAGON 1 MG
1 KIT INJECTION
Status: DISCONTINUED | OUTPATIENT
Start: 2023-07-19 | End: 2023-07-29 | Stop reason: HOSPADM

## 2023-07-19 RX ORDER — AMOXICILLIN 250 MG
1 CAPSULE ORAL DAILY PRN
Status: DISCONTINUED | OUTPATIENT
Start: 2023-07-19 | End: 2023-07-29 | Stop reason: HOSPADM

## 2023-07-19 RX ORDER — ACETAMINOPHEN 325 MG/1
650 TABLET ORAL EVERY 4 HOURS PRN
Status: DISCONTINUED | OUTPATIENT
Start: 2023-07-19 | End: 2023-07-29 | Stop reason: HOSPADM

## 2023-07-19 RX ORDER — SODIUM CHLORIDE 0.9 % (FLUSH) 0.9 %
10 SYRINGE (ML) INJECTION EVERY 8 HOURS
Status: DISCONTINUED | OUTPATIENT
Start: 2023-07-19 | End: 2023-07-19

## 2023-07-19 RX ORDER — SODIUM CHLORIDE 9 MG/ML
INJECTION, SOLUTION INTRAVENOUS ONCE
Status: COMPLETED | OUTPATIENT
Start: 2023-07-19 | End: 2023-07-19

## 2023-07-19 RX ORDER — IBUPROFEN 200 MG
16 TABLET ORAL
Status: DISCONTINUED | OUTPATIENT
Start: 2023-07-19 | End: 2023-07-29 | Stop reason: HOSPADM

## 2023-07-19 RX ORDER — IBUPROFEN 200 MG
24 TABLET ORAL
Status: DISCONTINUED | OUTPATIENT
Start: 2023-07-19 | End: 2023-07-29 | Stop reason: HOSPADM

## 2023-07-19 RX ORDER — HYDROCODONE BITARTRATE AND ACETAMINOPHEN 500; 5 MG/1; MG/1
TABLET ORAL
Status: DISCONTINUED | OUTPATIENT
Start: 2023-07-19 | End: 2023-07-24

## 2023-07-19 RX ORDER — HYDROCODONE BITARTRATE AND ACETAMINOPHEN 5; 325 MG/1; MG/1
1 TABLET ORAL EVERY 6 HOURS PRN
Status: DISCONTINUED | OUTPATIENT
Start: 2023-07-19 | End: 2023-07-25

## 2023-07-19 RX ADMIN — SODIUM CHLORIDE: 9 INJECTION, SOLUTION INTRAVENOUS at 08:07

## 2023-07-19 RX ADMIN — CEFTRIAXONE 1 G: 1 INJECTION, POWDER, FOR SOLUTION INTRAMUSCULAR; INTRAVENOUS at 06:07

## 2023-07-19 RX ADMIN — VANCOMYCIN HYDROCHLORIDE 1000 MG: 1 INJECTION, POWDER, LYOPHILIZED, FOR SOLUTION INTRAVENOUS at 04:07

## 2023-07-19 NOTE — ASSESSMENT & PLAN NOTE
Patient with Paroxysmal (<7 days) atrial fibrillation which is controlled currently with Beta Blocker.  Anticoagulation not indicated due to home eliquis held given anemia and Heme-positive stool in ED.

## 2023-07-19 NOTE — ASSESSMENT & PLAN NOTE
Presents with hemoglobin of 6.2. previously 8 to 9 2 weeks ago. Denies active bleeding. Heme-positive in ED.   Type and screen and transfuse 1 unit  Will hold home eliquis as heme positive   GI consulted  Repeat CBC

## 2023-07-19 NOTE — ED PROVIDER NOTES
Encounter Date: 7/19/2023       History     Chief Complaint   Patient presents with    Toe Injury     Pt reports a chair rolled over her left foot x2 weeks ago., Wound on top of her 3rd toe will not heal  Pt sent by home health nurse to have the wound check out.  Pt denies fever or chills.  Pt is a dialysis pt.     HPI  Review of patient's allergies indicates:   Allergen Reactions    Coreg [carvedilol] Other (See Comments)     Nausea/vomiting    Allopurinol      Other reaction(s): abnormal transaminases     Past Medical History:   Diagnosis Date    Anemia     Bronchitis 03/01/2017    Cancer 2016    kidney cancer    CHF (congestive heart failure), NYHA class II, chronic, systolic     CMV (cytomegalovirus) antibody positive     ESRD (end stage renal disease)     Essential hypertension 09/23/2015    H/O herpes simplex type 2 infection     Herpes simplex type 1 antibody positive     History of kidney cancer     s/p left nephrectomy 1/2016    Hyperparathyroidism, unspecified     Hyperuricemia without signs of inflammatory arthritis and tophaceous disease     Kidney stones     LGSIL (low grade squamous intraepithelial dysplasia)     Myocardiopathy 07/21/2017    Prediabetes     Proteinuria     Renal disorder     Thyroid nodule     Urate nephropathy      Past Surgical History:   Procedure Laterality Date    BREAST CYST EXCISION      COLONOSCOPY N/A 11/12/2015    COLONOSCOPY N/A 3/12/2021    Procedure: COLONOSCOPY;  Surgeon: Brendon Lanier MD;  Location: [facility];  Service: Endoscopy;  Laterality: N/A;  covid test 3/9, labs prior, prep instr mailed -ml    INSERTION OF BIVENTRICULAR IMPLANTABLE CARDIOVERTER-DEFIBRILLATOR (ICD)  04/2021    INSERTION OF TUNNELED CENTRAL VENOUS CATHETER (CVC) WITH SUBCUTANEOUS PORT N/A 1/25/2023    Procedure: INSERTION, DUAL LUMEN CATHETER WITH PORT, WITH IMAGING GUIDANCE;  Surgeon: FARIDEH Muñoz III, MD;  Location: [facility] OR [address] [place] [address];  Service: Peripheral Vascular;  Laterality: N/A;   possinble permcath placment    NEPHRECTOMY-LAPAROSCOPIC Left 01/12/2016    PERCUTANEOUS TRANSLUMINAL ANGIOPLASTY OF ARTERIOVENOUS FISTULA Left 4/19/2023    Procedure: PTA, AV FISTULA;  Surgeon: FARIDEH Muñoz III, MD;  Location: [facility] CATH LAB;  Service: Peripheral Vascular;  Laterality: Left;    PERITONEAL CATHETER INSERTION      Permacath insertion  01/12/2017    PLACEMENT OF ARTERIOVENOUS GRAFT  12/28/2022    Procedure: INSERTION, GRAFT, ARTERIOVENOUS;  Surgeon: FARIDEH Muñoz III, MD;  Location: [facility] OR [facility];  Service: Peripheral Vascular;;    REMOVAL, GRAFT, ARTERIOVENOUS, UPPER EXTREMITY Left 1/25/2023    Procedure: REMOVAL, GRAFT, ARTERIOVENOUS, UPPER EXTREMITY;  Surgeon: FARIDEH Muñoz III, MD;  [facility];  Service: Peripheral Vascular;  Laterality: Left;    REVISION OF ARTERIOVENOUS FISTULA Left 5/1/2023    Procedure: REVISION, AV FISTULA;  Surgeon: FARIDEH Muñoz III, MD;  Location: [facility] OR [facility];  Service: Peripheral Vascular;  Laterality: Left;  LUE AVG revision    REVISION OF PROCEDURE INVOLVING ARTERIOVENOUS GRAFT Left 12/28/2022    Procedure: REVISION, PROCEDURE INVOLVING ARTERIOVENOUS GRAFT;  Surgeon: FARIDEH Muñoz III, MD;  [facility];  Service: Peripheral Vascular;  Laterality: Left;    SALPINGOOPHORECTOMY Right 2016    KJB---DAVINCI    TONSILLECTOMY      TUBAL LIGATION       Family History   Problem Relation Age of Onset    Hypertension Mother     Diabetes Father     Kidney disease Father     No Known Problems Sister     Heart disease Sister     No Known Problems Sister     No Known Problems Brother     No Known Problems Brother     Cataracts Maternal Aunt     No Known Problems Maternal Uncle     No Known Problems Paternal Aunt     No Known Problems Paternal Uncle     No Known Problems Maternal Grandmother     Diabetes Maternal Grandfather     No Known Problems Paternal Grandmother     No Known Problems Paternal Grandfather     No Known Problems Son     No  Known Problems Son     No Known Problems Other     Breast cancer Neg Hx     Colon cancer Neg Hx     Cancer Neg Hx     Stroke Neg Hx     Amblyopia Neg Hx     Blindness Neg Hx     Glaucoma Neg Hx     Macular degeneration Neg Hx     Retinal detachment Neg Hx     Strabismus Neg Hx     Thyroid disease Neg Hx      Social History     Tobacco Use    Smoking status: Never    Smokeless tobacco: Never   Substance Use Topics    Alcohol use: No     Comment: . 2 children. works at [employer].    Drug use: No     Review of Systems    Physical Exam     Initial Vitals [07/19/23 1500]   BP Pulse Resp Temp SpO2   (!) 108/57 85 18 99.9 °F (37.7 °C) 96 %      MAP       --         Physical Exam  The patient was examined specifically for the following:   General:No significant distress, Good color, Warm and dry. Head and neck:Scalp atraumatic, Neck supple. Neurological:Appropriate conversation, Gross motor deficits. Eyes:Conjugate gaze, Clear corneas. ENT: No epistaxis. Cardiac: Regular rate and rhythm, Grossly normal heart tones. Pulmonary: Wheezing, Rales. Gastrointestinal: Abdominal tenderness, Abdominal distention. Musculoskeletal: Extremity deformity, Apparent pain with range of motion of the joints. Skin: Rash.   The findings on examination were normal except for the following:  The patient has a dialysis port in the left humeral arm.  The patient has pitting edema both lower extremities down to the knees.  There is an ulcer on the dorsum of the distal left middle toe.  There is no significant surrounding erythema.  The patient also has some ulceration around the distal toe, on the right side.  ED Course   Critical Care    Date/Time: 7/19/2023 5:33 PM  Performed by: Leeroy Nix MD  Authorized by: Leeroy Nix MD   Direct patient critical care time: 22 minutes  Additional history critical care time: 11 minutes  Ordering / reviewing critical care time: 11 minutes  Documentation critical care time: 11 minutes  Consulting  other physicians critical care time: 11 minutes  Total critical care time (exclusive of procedural time) : 66 minutes  Critical care time was exclusive of separately billable procedures and treating other patients and teaching time.  Critical care was necessary to treat or prevent imminent or life-threatening deterioration of the following conditions: renal failure and metabolic crisis.  Critical care was time spent personally by me on the following activities: development of treatment plan with patient or surrogate, discussions with consultants, discussions with primary provider, evaluation of patient's response to treatment, examination of patient, obtaining history from patient or surrogate, ordering and performing treatments and interventions, ordering and review of laboratory studies, ordering and review of radiographic studies, pulse oximetry, re-evaluation of patient's condition and review of old charts.      Labs Reviewed   CBC W/ AUTO DIFFERENTIAL - Abnormal; Notable for the following components:       Result Value    RBC 1.96 (*)     Hemoglobin 6.2 (*)     Hematocrit 19.8 (*)      (*)     MCH 31.6 (*)     MCHC 31.3 (*)     RDW 19.7 (*)     Immature Granulocytes 0.8 (*)     Immature Grans (Abs) 0.05 (*)     Mono % 15.9 (*)     All other components within normal limits    Narrative:     HCT critical result(s) called and verbal readback obtained from Mackenzie Yip RN by [facility] 07/19/2023 16:21   COMPREHENSIVE METABOLIC PANEL - Abnormal; Notable for the following components:    BUN 65 (*)     Creatinine 4.8 (*)     Total Protein 5.9 (*)     Albumin 2.4 (*)     Alkaline Phosphatase 315 (*)     AST 65 (*)     eGFR 10 (*)     All other components within normal limits   CULTURE, BLOOD   CULTURE, BLOOD   CULTURE, AEROBIC  (SPECIFY SOURCE)   LACTIC ACID, PLASMA   IRON AND TIBC   FERRITIN   LACTIC ACID, PLASMA   FERRITIN   IRON AND TIBC   TYPE & SCREEN   ISTAT LACTATE   PREPARE RBC SOFT          Imaging Results               X-Ray Chest 1 View (In process)                      X-Ray Foot Complete Left (Final result)  Result time 07/19/23 15:37:29      Final result by Asad Martin MD (07/19/23 15:37:29)                   Impression:      As above.      Electronically signed by: Asad Martin  Date:    07/19/2023  Time:    15:37               Narrative:    EXAMINATION:  XR FOOT COMPLETE 3 VIEW LEFT    CLINICAL HISTORY:  .  Pain, unspecified    TECHNIQUE:  AP, lateral and oblique views of the left foot were performed.    COMPARISON:  Radiographs 02/14/2023    FINDINGS:  Diffuse osteopenia.  No acute fracture or dislocation.  Joint spaces are preserved.  Achilles enthesopathy and small plantar calcaneal spur.  There is soft tissue swelling in the dorsal ankle and forefoot, which is nonspecific.  Extensive vascular calcifications.                                    Medical decision making:  Given the above, this patient presents emergency room with a ulcer on the left middle toe.  Ulcers been present for a month.  It is a nonhealing wound.  X-rays failed to reveal evidence of osteomyelitis.  The patient arrives tachycardic.  Rectal slightly guaiac-positive stool.  The patient is discovered to have a hemoglobin 6.4.  She will be admitted for transfusion.   advises 1 unit slowly tonight followed by a possible 2nd unit in the morning on dialysis.  I discussed this case with NP Showers, who will admit the patient to the hospital medicine service.  I will consult Podiatry.  I will consult Nephrology.  Chemistries reflects the patient's end-stage renal disease on hemodialysis.  The patient has an elevated alkaline phosphatase but a normal bilirubin.  The patient is known to have metastatic renal cell carcinoma.  I believe the leg edema is from hypoproteinemia.  I doubt DVT.  The white blood count is normal.  I doubt systemic bacterial infection.         Medications   vancomycin (VANCOCIN) 1,000 mg in dextrose 5 % (D5W) 250 mL  IVPB (Vial-Mate) (1,000 mg Intravenous New Bag 7/19/23 1619)   cefTRIAXone (ROCEPHIN) 1 g in dextrose 5 % in water (D5W) 5 % 100 mL IVPB (MB+) (has no administration in time range)   0.9%  NaCl infusion (for blood administration) (has no administration in time range)                              Clinical Impression:   Final diagnoses:  [R52] Pain  [D64.9] Severe anemia  [L98.491] Skin ulcer, limited to breakdown of skin - Right great toe, left middle toe (Primary)  [N18.6, Z99.2] End-stage renal disease on hemodialysis  [C64.9] Metastatic renal cell carcinoma, unspecified laterality  [R60.0] Bilateral leg edema        ED Disposition Condition    Observation Stable                Leeroy Nix MD  07/19/23 4006

## 2023-07-19 NOTE — ED PROVIDER NOTES
Encounter Date: 7/19/2023    SCRIBE #1 NOTE: I, Citlali Nuñez, am scribing for, and in the presence of,  Leeroy Nix MD. I have scribed the following portions of the note - Other sections scribed: HPI, ROS.     History     Chief Complaint   Patient presents with    Toe Injury     Pt reports a chair rolled over her left foot x2 weeks ago., Wound on top of her 3rd toe will not heal  Pt sent by home health nurse to have the wound check out.  Pt denies fever or chills.  Pt is a dialysis pt.     A 57 years old female with a PMHx of kidney cancer, CHF, A-Fib, HTN, ESRD, prediabetes, who presents to the ED for a chief complaint of wound on top of the third digit of his left foot onset 2 weeks ago. Patient reports that a chair rolled over her left foot 2 weeks ago causing a wound on the third digit. Patient attempted Tx with antibiotic ointment without relief. Endorses on dialysis. Further endorses swelling in the feet bilaterally onset more than a month ago. No other alleviating or exacerbating factors. Denies any cough, shortness of breath, chest pain, fever, chills, abdominal pain, nausea, vomiting, diarrhea, dysuria, headaches, congestion, sore throat, arm trouble, eye pain, ear pain, rash, or other associated symptoms. She is allergic to allopurinol, and coreg.         The history is provided by the patient. No  was used.   Review of patient's allergies indicates:   Allergen Reactions    Coreg [carvedilol] Other (See Comments)     Nausea/vomiting    Allopurinol      Other reaction(s): abnormal transaminases     Past Medical History:   Diagnosis Date    Anemia     Bronchitis 03/01/2017    Cancer 2016    kidney cancer    CHF (congestive heart failure), NYHA class II, chronic, systolic     CMV (cytomegalovirus) antibody positive     ESRD (end stage renal disease)     Essential hypertension 09/23/2015    H/O herpes simplex type 2 infection     Herpes simplex type 1 antibody positive     History  of kidney cancer     s/p left nephrectomy 1/2016    Hyperparathyroidism, unspecified     Hyperuricemia without signs of inflammatory arthritis and tophaceous disease     Kidney stones     LGSIL (low grade squamous intraepithelial dysplasia)     Myocardiopathy 07/21/2017    Prediabetes     Proteinuria     Renal disorder     Thyroid nodule     Urate nephropathy      Past Surgical History:   Procedure Laterality Date    BREAST CYST EXCISION      COLONOSCOPY N/A 11/12/2015    COLONOSCOPY N/A 3/12/2021    Procedure: COLONOSCOPY;  Surgeon: Brendon Lanier MD;  Location: [facility] ENDO;  Service: Endoscopy;  Laterality: N/A;  covid test 3/9, labs prior, prep instr mailed -ml    INSERTION OF BIVENTRICULAR IMPLANTABLE CARDIOVERTER-DEFIBRILLATOR (ICD)  04/2021    INSERTION OF TUNNELED CENTRAL VENOUS CATHETER (CVC) WITH SUBCUTANEOUS PORT N/A 1/25/2023    Procedure: INSERTION, DUAL LUMEN CATHETER WITH PORT, WITH IMAGING GUIDANCE;  Surgeon: FARIDEH Muñoz III, MD;  Location: [facility] OR [address] [place] [address];  Service: Peripheral Vascular;  Laterality: N/A;  possinble permcath placment    NEPHRECTOMY-LAPAROSCOPIC Left 01/12/2016    PERCUTANEOUS TRANSLUMINAL ANGIOPLASTY OF ARTERIOVENOUS FISTULA Left 4/19/2023    Procedure: PTA, AV FISTULA;  Surgeon: FARIDEH Muñoz III, MD;  Location: [facility] CATH LAB;  Service: Peripheral Vascular;  Laterality: Left;    PERITONEAL CATHETER INSERTION      Permacath insertion  01/12/2017    PLACEMENT OF ARTERIOVENOUS GRAFT  12/28/2022    Procedure: INSERTION, GRAFT, ARTERIOVENOUS;  Surgeon: FARIDEH Muñoz III, MD;  [facility];  Service: Peripheral Vascular;;    REMOVAL, GRAFT, ARTERIOVENOUS, UPPER EXTREMITY Left 1/25/2023    Procedure: REMOVAL, GRAFT, ARTERIOVENOUS, UPPER EXTREMITY;  Surgeon: FARIDEH Muñoz III, MD;  [facility];  Service: Peripheral Vascular;  Laterality: Left;    REVISION OF ARTERIOVENOUS FISTULA Left 5/1/2023    Procedure: REVISION, AV FISTULA;  Surgeon: FARIDEH CHEN  Toni SAM MD;  Location: [facility] OR [address] [place] [address];  Service: Peripheral Vascular;  Laterality: Left;  LUE AVG revision    REVISION OF PROCEDURE INVOLVING ARTERIOVENOUS GRAFT Left 12/28/2022    Procedure: REVISION, PROCEDURE INVOLVING ARTERIOVENOUS GRAFT;  Surgeon: FARIDEH Muñoz III, MD;  Location: [facility] OR [address] [place] [address];  Service: Peripheral Vascular;  Laterality: Left;    SALPINGOOPHORECTOMY Right 2016    KJB---DAVINCI    TONSILLECTOMY      TUBAL LIGATION       Family History   Problem Relation Age of Onset    Hypertension Mother     Diabetes Father     Kidney disease Father     No Known Problems Sister     Heart disease Sister     No Known Problems Sister     No Known Problems Brother     No Known Problems Brother     Cataracts Maternal Aunt     No Known Problems Maternal Uncle     No Known Problems Paternal Aunt     No Known Problems Paternal Uncle     No Known Problems Maternal Grandmother     Diabetes Maternal Grandfather     No Known Problems Paternal Grandmother     No Known Problems Paternal Grandfather     No Known Problems Son     No Known Problems Son     No Known Problems Other     Breast cancer Neg Hx     Colon cancer Neg Hx     Cancer Neg Hx     Stroke Neg Hx     Amblyopia Neg Hx     Blindness Neg Hx     Glaucoma Neg Hx     Macular degeneration Neg Hx     Retinal detachment Neg Hx     Strabismus Neg Hx     Thyroid disease Neg Hx      Social History     Tobacco Use    Smoking status: Never    Smokeless tobacco: Never   Substance Use Topics    Alcohol use: No     Comment: . 2 children. works at [employer].    Drug use: No     Review of Systems   Constitutional:  Negative for chills, diaphoresis and fever.   HENT:  Negative for congestion, ear pain and sore throat.    Eyes:  Negative for pain.   Respiratory:  Negative for cough and shortness of breath.    Cardiovascular:  Negative for chest pain.   Gastrointestinal:  Negative for abdominal pain, diarrhea, nausea and vomiting.   Genitourinary:  Negative for  dysuria.   Musculoskeletal:  Negative for back pain.        (-) Arm trouble.   (+) Swelling in bilateral feet.   Skin:  Positive for wound (on the 3rd digit of L foot). Negative for rash.   Neurological:  Negative for headaches.   Psychiatric/Behavioral:  Negative for confusion.      Physical Exam     Initial Vitals [07/19/23 1500]   BP Pulse Resp Temp SpO2   (!) 108/57 85 18 99.9 °F (37.7 °C) 96 %      MAP       --         Physical Exam    ED Course   Procedures  Labs Reviewed   CULTURE, BLOOD   CULTURE, BLOOD   CULTURE, AEROBIC  (SPECIFY SOURCE)   CBC W/ AUTO DIFFERENTIAL   COMPREHENSIVE METABOLIC PANEL   LACTIC ACID, PLASMA   ISTAT LACTATE          Imaging Results              X-Ray Foot Complete Left (Final result)  Result time 07/19/23 15:37:29      Final result by Asad Martin MD (07/19/23 15:37:29)                   Impression:      As above.      Electronically signed by: Asad Martin  Date:    07/19/2023  Time:    15:37               Narrative:    EXAMINATION:  XR FOOT COMPLETE 3 VIEW LEFT    CLINICAL HISTORY:  .  Pain, unspecified    TECHNIQUE:  AP, lateral and oblique views of the left foot were performed.    COMPARISON:  Radiographs 02/14/2023    FINDINGS:  Diffuse osteopenia.  No acute fracture or dislocation.  Joint spaces are preserved.  Achilles enthesopathy and small plantar calcaneal spur.  There is soft tissue swelling in the dorsal ankle and forefoot, which is nonspecific.  Extensive vascular calcifications.                                       Medications   vancomycin (VANCOCIN) 1,000 mg in dextrose 5 % (D5W) 250 mL IVPB (Vial-Mate) (has no administration in time range)     Medical Decision Making:   History:   Old Medical Records: I decided to obtain old medical records.  Old Records Summarized: other records.       <> Summary of Records: External documents reviewed.  Clinical Tests:   Lab Tests: Ordered and Reviewed  Radiological Study: Ordered and Reviewed        Scribe Attestation:    Scribe #1: I performed the above scribed service and the documentation accurately describes the services I performed. I attest to the accuracy of the note.                 ***  Clinical Impression:   Final diagnoses:  [R52] Pain

## 2023-07-19 NOTE — ASSESSMENT & PLAN NOTE
Hypotensive on arrival. Home hydralazine, held. Continue home metoprolol with hold parameters for HR and BP. will hold resuming clonidine patch

## 2023-07-19 NOTE — ED PROVIDER NOTES
Encounter Date: 7/19/2023       History     Chief Complaint   Patient presents with    Toe Injury     Pt reports a chair rolled over her left foot x2 weeks ago., Wound on top of her 3rd toe will not heal  Pt sent by home health nurse to have the wound check out.  Pt denies fever or chills.  Pt is a dialysis pt.     A 57 years old female with a PMHx of kidney cancer, CHF, A-Fib, HTN, ESRD, prediabetes, who presents to the ED for a chief complaint of wound on top of the third digit of his left foot onset 2 weeks ago. Patient reports that a chair rolled over her left foot 2 weeks ago causing a wound on the third digit. Patient attempted Tx with antibiotic ointment without relief. Endorses on dialysis. Further endorses swelling in the feet bilaterally onset more than a month ago. No other alleviating or exacerbating factors. Denies any cough, shortness of breath, chest pain, fever, chills, abdominal pain, nausea, vomiting, diarrhea, dysuria, headaches, congestion, sore throat, arm trouble, eye pain, ear pain, rash, or other associated symptoms. She is allergic to allopurinol, and coreg.      Review of patient's allergies indicates:   Allergen Reactions    Coreg [carvedilol] Other (See Comments)     Nausea/vomiting    Allopurinol      Other reaction(s): abnormal transaminases     Past Medical History:   Diagnosis Date    Anemia     Atrial fibrillation     Bronchitis 03/01/2017    Cancer 2016    kidney cancer    CHF (congestive heart failure), NYHA class II, chronic, systolic     CMV (cytomegalovirus) antibody positive     Encounter for blood transfusion     ESRD (end stage renal disease)     Essential hypertension 09/23/2015    H/O herpes simplex type 2 infection     Herpes simplex type 1 antibody positive     History of kidney cancer     s/p left nephrectomy 1/2016    Hyperparathyroidism, unspecified     Hyperuricemia without signs of inflammatory arthritis and tophaceous disease     Kidney stones     LGSIL (low grade  squamous intraepithelial dysplasia)     Myocardiopathy 07/21/2017    Prediabetes     Proteinuria     Renal disorder     Thyroid nodule     Urate nephropathy      Past Surgical History:   Procedure Laterality Date    BREAST CYST EXCISION      COLONOSCOPY N/A 11/12/2015    COLONOSCOPY N/A 3/12/2021    Procedure: COLONOSCOPY;  Surgeon: Brendon Lanier MD;  Location: [facility] ENDO;  Service: Endoscopy;  Laterality: N/A;  covid test 3/9, labs prior, prep instr mailed -ml    INSERTION OF BIVENTRICULAR IMPLANTABLE CARDIOVERTER-DEFIBRILLATOR (ICD)  04/2021    INSERTION OF TUNNELED CENTRAL VENOUS CATHETER (CVC) WITH SUBCUTANEOUS PORT N/A 1/25/2023    Procedure: INSERTION, DUAL LUMEN CATHETER WITH PORT, WITH IMAGING GUIDANCE;  Surgeon: FARIDEH Muñoz III, MD;  [facility];  Service: Peripheral Vascular;  Laterality: N/A;  possinble permcath placment    NEPHRECTOMY-LAPAROSCOPIC Left 01/12/2016    PERCUTANEOUS TRANSLUMINAL ANGIOPLASTY OF ARTERIOVENOUS FISTULA Left 4/19/2023    Procedure: PTA, AV FISTULA;  Surgeon: FARIDEH Muñoz III, MD;  Location: [facility] CATH LAB;  Service: Peripheral Vascular;  Laterality: Left;    PERITONEAL CATHETER INSERTION      Permacath insertion  01/12/2017    PLACEMENT OF ARTERIOVENOUS GRAFT  12/28/2022    Procedure: INSERTION, GRAFT, ARTERIOVENOUS;  Surgeon: FARIDEH Muñoz III, MD;  [facility];  Service: Peripheral Vascular;;    REMOVAL, GRAFT, ARTERIOVENOUS, UPPER EXTREMITY Left 1/25/2023    Procedure: REMOVAL, GRAFT, ARTERIOVENOUS, UPPER EXTREMITY;  Surgeon: FARIDEH Muñoz III, MD;  Location: [facility] OR [facility] FLR;  Service: Peripheral Vascular;  Laterality: Left;    REVISION OF ARTERIOVENOUS FISTULA Left 5/1/2023    Procedure: REVISION, AV FISTULA;  Surgeon: FARIDEH Muñoz III, MD;  Location: [facility] OR [facility] FLR;  Service: Peripheral Vascular;  Laterality: Left;  LUE AVG revision    REVISION OF PROCEDURE INVOLVING ARTERIOVENOUS GRAFT Left 12/28/2022    Procedure: REVISION,  PROCEDURE INVOLVING ARTERIOVENOUS GRAFT;  Surgeon: FARIDEH Muñoz III, MD;  Location: [facility] OR [address] [place] [address];  Service: Peripheral Vascular;  Laterality: Left;    SALPINGOOPHORECTOMY Right 2016    KJB---DAVINCI    TONSILLECTOMY      TUBAL LIGATION       Family History   Problem Relation Age of Onset    Hypertension Mother     Diabetes Father     Kidney disease Father     No Known Problems Sister     Heart disease Sister     No Known Problems Sister     No Known Problems Brother     No Known Problems Brother     Cataracts Maternal Aunt     No Known Problems Maternal Uncle     No Known Problems Paternal Aunt     No Known Problems Paternal Uncle     No Known Problems Maternal Grandmother     Diabetes Maternal Grandfather     No Known Problems Paternal Grandmother     No Known Problems Paternal Grandfather     No Known Problems Son     No Known Problems Son     No Known Problems Other     Breast cancer Neg Hx     Colon cancer Neg Hx     Cancer Neg Hx     Stroke Neg Hx     Amblyopia Neg Hx     Blindness Neg Hx     Glaucoma Neg Hx     Macular degeneration Neg Hx     Retinal detachment Neg Hx     Strabismus Neg Hx     Thyroid disease Neg Hx      Social History     Tobacco Use    Smoking status: Never    Smokeless tobacco: Never   Substance Use Topics    Alcohol use: No     Comment: . 2 children. works at [employer].    Drug use: Never     Review of Systems   Constitutional:  Negative for chills, diaphoresis and fever.   HENT:  Negative for ear pain and sore throat.    Eyes:  Negative for pain.   Respiratory:  Negative for cough and shortness of breath.    Cardiovascular:  Negative for chest pain.   Gastrointestinal:  Negative for abdominal pain, diarrhea, nausea and vomiting.   Genitourinary:  Negative for dysuria.   Musculoskeletal:  Negative for back pain.        (-) Arm or leg trouble.    Skin:  Positive for wound (on the 3rd digit of L foot). Negative for rash.   Neurological:  Negative for headaches.    Psychiatric/Behavioral:  Negative for confusion.      Physical Exam     Initial Vitals [07/19/23 1500]   BP Pulse Resp Temp SpO2   (!) 108/57 85 18 99.9 °F (37.7 °C) 96 %      MAP       --         Physical Exam    ED Course   Procedures  Labs Reviewed   CBC W/ AUTO DIFFERENTIAL - Abnormal; Notable for the following components:       Result Value    RBC 1.96 (*)     Hemoglobin 6.2 (*)     Hematocrit 19.8 (*)      (*)     MCH 31.6 (*)     MCHC 31.3 (*)     RDW 19.7 (*)     Immature Granulocytes 0.8 (*)     Immature Grans (Abs) 0.05 (*)     Mono % 15.9 (*)     All other components within normal limits    Narrative:     HCT critical result(s) called and verbal readback obtained from Mackenzie Yip RN by [facility] 07/19/2023 16:21   COMPREHENSIVE METABOLIC PANEL - Abnormal; Notable for the following components:    BUN 65 (*)     Creatinine 4.8 (*)     Total Protein 5.9 (*)     Albumin 2.4 (*)     Alkaline Phosphatase 315 (*)     AST 65 (*)     eGFR 10 (*)     All other components within normal limits   FERRITIN - Abnormal; Notable for the following components:    Ferritin 948 (*)     All other components within normal limits   IRON AND TIBC - Abnormal; Notable for the following components:    Transferrin 153 (*)     TIBC 226 (*)     All other components within normal limits   CULTURE, BLOOD   CULTURE, BLOOD   CULTURE, AEROBIC  (SPECIFY SOURCE)   LACTIC ACID, PLASMA   IRON AND TIBC   FERRITIN   LACTIC ACID, PLASMA    Narrative:     Collection has been rescheduled by SKM1 at 07/19/2023 17:46 Reason:   Unable to collect hard stick   TYPE & SCREEN   ISTAT LACTATE   PREPARE RBC SOFT          Imaging Results              US Lower Extremity Arteries Bilateral (In process)    Procedure changed from US Lower Extrem Arteries Bilat with NUVIA (xpd)                    X-Ray Chest 1 View (Final result)  Result time 07/19/23 18:25:09      Final result by Gustavo Ochoa MD (07/19/23 18:25:09)                   Impression:       Increased globular enlargement of the cardiac silhouette.  Differential includes cardiomegaly, pericardial effusion or both.    Increased pulmonary vasculature.  Correlate for pulmonary venous hypertension or CHF.      Electronically signed by: Gustavo Ochoa  Date:    07/19/2023  Time:    18:25               Narrative:    EXAMINATION:  XR CHEST 1 VIEW    CLINICAL HISTORY:  Anemia, unspecified    TECHNIQUE:  Single frontal view of the chest was performed.    COMPARISON:  12/18/2022    FINDINGS:  Cardiac silhouette is increased in size with a globular configuration.  AICD mild anteriorly is again noted.  The pulmonary vasculature has increased in size with reticular opacities bilaterally but no consolidation or discrete effusion.  Vascular stent in the axillary and brachial vessel on the left noted.  Bones intact.                                       X-Ray Foot Complete Left (Final result)  Result time 07/19/23 15:37:29      Final result by Asad Martin MD (07/19/23 15:37:29)                   Impression:      As above.      Electronically signed by: Asad Martin  Date:    07/19/2023  Time:    15:37               Narrative:    EXAMINATION:  XR FOOT COMPLETE 3 VIEW LEFT    CLINICAL HISTORY:  .  Pain, unspecified    TECHNIQUE:  AP, lateral and oblique views of the left foot were performed.    COMPARISON:  Radiographs 02/14/2023    FINDINGS:  Diffuse osteopenia.  No acute fracture or dislocation.  Joint spaces are preserved.  Achilles enthesopathy and small plantar calcaneal spur.  There is soft tissue swelling in the dorsal ankle and forefoot, which is nonspecific.  Extensive vascular calcifications.                                       Medications   cefTRIAXone (ROCEPHIN) 1 g in dextrose 5 % in water (D5W) 5 % 100 mL IVPB (MB+) (1 g Intravenous New Bag 7/19/23 1829)   0.9%  NaCl infusion (for blood administration) (has no administration in time range)   melatonin tablet 6 mg (has no administration in time  range)   senna-docusate 8.6-50 mg per tablet 1 tablet (has no administration in time range)   acetaminophen tablet 650 mg (has no administration in time range)   naloxone 0.4 mg/mL injection 0.02 mg (has no administration in time range)   magnesium oxide tablet 800 mg (has no administration in time range)   magnesium oxide tablet 800 mg (has no administration in time range)   glucose chewable tablet 16 g (has no administration in time range)   glucose chewable tablet 24 g (has no administration in time range)   dextrose 50% injection 12.5 g (has no administration in time range)   dextrose 50% injection 25 g (has no administration in time range)   glucagon (human recombinant) injection 1 mg (has no administration in time range)   HYDROcodone-acetaminophen 5-325 mg per tablet 1 tablet (has no administration in time range)   levothyroxine tablet 75 mcg (has no administration in time range)   metoprolol succinate (TOPROL-XL) 24 hr tablet 50 mg (has no administration in time range)   sodium chloride 0.9% flush 10 mL (has no administration in time range)   vancomycin - pharmacy to dose (has no administration in time range)   mupirocin 2 % ointment (has no administration in time range)   0.9%  NaCl infusion (has no administration in time range)   0.9%  NaCl infusion (has no administration in time range)   sodium chloride 0.9% bolus 250 mL 250 mL (has no administration in time range)   vancomycin (VANCOCIN) 1,000 mg in dextrose 5 % (D5W) 250 mL IVPB (Vial-Mate) (0 mg Intravenous Stopped 7/19/23 1811)                              Clinical Impression:   Final diagnoses:  [R52] Pain  [D64.9] Severe anemia  [L98.491] Skin ulcer, limited to breakdown of skin - Right great toe, left middle toe (Primary)  [N18.6, Z99.2] End-stage renal disease on hemodialysis  [C64.9] Metastatic renal cell carcinoma, unspecified laterality  [R60.0] Bilateral leg edema  [R07.9] Chest pain        ED Disposition Condition    Observation Stable

## 2023-07-19 NOTE — Clinical Note
Diagnosis: Severe anemia [[id number]]   Future Attending Provider: DARRICK BAY [[id number]]   Admitting Provider:: DARRICK BAY [[id number]]   Special Needs:: No Special Needs [1]

## 2023-07-19 NOTE — ASSESSMENT & PLAN NOTE
Lab Results   Component Value Date    TSH 15.273 (H) 07/10/2023     Resume home synthroid has not been taking outpatient

## 2023-07-19 NOTE — ASSESSMENT & PLAN NOTE
Patient is identified as having Combined Systolic and Diastolic heart failure that is Chronic. CHF is currently controlled. Latest ECHO performed and demonstrates- Results for orders placed during the hospital encounter of 05/08/23    Echo Saline Bubble? No    Interpretation Summary  · The left ventricle is normal in size with mild eccentric hypertrophy and severely decreased systolic function.  · The estimated ejection fraction is 23%.  · There is severe left ventricular global hypokinesis.  · Grade II left ventricular diastolic dysfunction.  · Moderate left atrial enlargement.  · Normal right ventricular size with normal right ventricular systolic function.  · Moderate right atrial enlargement.  · There is mild aortic valve stenosis.  · Aortic valve area is 1.58 cm2; peak velocity is 2.01 m/s; mean gradient is 8 mmHg.  · Moderate mitral regurgitation.  · Moderate to severe tricuspid regurgitation.  · Mild pulmonic regurgitation.  · Elevated central venous pressure (15 mmHg).  · The estimated PA systolic pressure is 47 mmHg.  · There is mild-moderate pulmonary hypertension.  · Moderate posterolateral pericardial effusion. Trivial under the RA  . Continue Beta Blocker and ARNI and monitor clinical status closely. Monitor on telemetry. Patient is off CHF pathway.  Monitor strict Is&Os and daily weights.  Place on fluid restriction of 1 L. Cardiology has not been any consulted. Continue to stress to patient importance of self efficacy and  on diet for CHF. Last BNP reviewed- and noted below No results for input(s): BNP, BNPTRIAGEBLO in the last 168 hours..

## 2023-07-19 NOTE — H&P
[facility] Emergency Dept  [facility]  History & Physical    Patient Name: Eva Bloom  MRN: [id number]  Patient Class: OP- Observation  Admission Date: 7/19/2023  Attending Physician: Leeroy Cheney MD   Primary Care Provider: Sowmya Mccain MD         Patient information was obtained from patient, past medical records and ER records.     Subjective:     Principal Problem:Toe ulcer    Chief Complaint:   Chief Complaint   Patient presents with    Toe Injury     Pt reports a chair rolled over her left foot x2 weeks ago., Wound on top of her 3rd toe will not heal  Pt sent by home health nurse to have the wound check out.  Pt denies fever or chills.  Pt is a dialysis pt.        HPI: Eva Bloom 57 y.o. female with afib on eliquis, HTN, CHF, ESRD on HD T/TH/S, RCC presents to the hospital chief complaint of toe wound.  She reports 1 month an ulcer to left toe that occurred after a chair rolled over foot.  He is had increasing pain to the toe described as a throbbing.  She attempted treatment at home with ice without improvement.  She is dialyzed Tuesday Thursday Saturday at [facility] in [place].  She no longer makes urine.  She denies fever chest pain shortness breast nausea vomiting abdominal pain melena hematuria hematemesis dizziness syncope.    In the ED, afebrile without leukocytosis x-ray foot without fracture or dislocation potassium 4.3 lactic acid negative heme-positive stool.      Past Medical History:   Diagnosis Date    Anemia     Bronchitis 03/01/2017    Cancer 2016    kidney cancer    CHF (congestive heart failure), NYHA class II, chronic, systolic     CMV (cytomegalovirus) antibody positive     ESRD (end stage renal disease)     Essential hypertension 09/23/2015    H/O herpes simplex type 2 infection     Herpes simplex type 1 antibody positive     History of kidney cancer     s/p left nephrectomy 1/2016    Hyperparathyroidism, unspecified     Hyperuricemia without signs of  inflammatory arthritis and tophaceous disease     Kidney stones     LGSIL (low grade squamous intraepithelial dysplasia)     Myocardiopathy 07/21/2017    Prediabetes     Proteinuria     Renal disorder     Thyroid nodule     Urate nephropathy        Past Surgical History:   Procedure Laterality Date    BREAST CYST EXCISION      COLONOSCOPY N/A 11/12/2015    COLONOSCOPY N/A 3/12/2021    Procedure: COLONOSCOPY;  Surgeon: Brendon Lanier MD;  Location: [facility] ENDO;  Service: Endoscopy;  Laterality: N/A;  covid test 3/9, labs prior, prep instr mailed -ml    INSERTION OF BIVENTRICULAR IMPLANTABLE CARDIOVERTER-DEFIBRILLATOR (ICD)  04/2021    INSERTION OF TUNNELED CENTRAL VENOUS CATHETER (CVC) WITH SUBCUTANEOUS PORT N/A 1/25/2023    Procedure: INSERTION, DUAL LUMEN CATHETER WITH PORT, WITH IMAGING GUIDANCE;  Surgeon: FARIDEH Muñoz III, MD;  Location: [facility] OR [address] [place] [address];  Service: Peripheral Vascular;  Laterality: N/A;  possinble permcath placment    NEPHRECTOMY-LAPAROSCOPIC Left 01/12/2016    PERCUTANEOUS TRANSLUMINAL ANGIOPLASTY OF ARTERIOVENOUS FISTULA Left 4/19/2023    Procedure: PTA, AV FISTULA;  Surgeon: FARIDEH Muñoz III, MD;  Location: [facility] CATH LAB;  Service: Peripheral Vascular;  Laterality: Left;    PERITONEAL CATHETER INSERTION      Permacath insertion  01/12/2017    PLACEMENT OF ARTERIOVENOUS GRAFT  12/28/2022    Procedure: INSERTION, GRAFT, ARTERIOVENOUS;  Surgeon: FARIDEH Muñoz III, MD;  [facility];  Service: Peripheral Vascular;;    REMOVAL, GRAFT, ARTERIOVENOUS, UPPER EXTREMITY Left 1/25/2023    Procedure: REMOVAL, GRAFT, ARTERIOVENOUS, UPPER EXTREMITY;  Surgeon: FARIDEH Muñoz III, MD;  Location: [facility] OR [facility] FLR;  Service: Peripheral Vascular;  Laterality: Left;    REVISION OF ARTERIOVENOUS FISTULA Left 5/1/2023    Procedure: REVISION, AV FISTULA;  Surgeon: FARIDEH uMñoz III, MD;  Location: [facility] OR [facility] FLR;  Service: Peripheral Vascular;  Laterality: Left;  LUE AVG  revision    REVISION OF PROCEDURE INVOLVING ARTERIOVENOUS GRAFT Left 12/28/2022    Procedure: REVISION, PROCEDURE INVOLVING ARTERIOVENOUS GRAFT;  Surgeon: FARIDEH Muñoz III, MD;  Location: [facility] OR [address] [place] [address];  Service: Peripheral Vascular;  Laterality: Left;    SALPINGOOPHORECTOMY Right 2016    KJB---DAVINCI    TONSILLECTOMY      TUBAL LIGATION         Review of patient's allergies indicates:   Allergen Reactions    Coreg [carvedilol] Other (See Comments)     Nausea/vomiting    Allopurinol      Other reaction(s): abnormal transaminases       Current Facility-Administered Medications on File Prior to Encounter   Medication    0.9%  NaCl infusion     Current Outpatient Medications on File Prior to Encounter   Medication Sig    apixaban (ELIQUIS) 2.5 mg Tab Take 1 tablet (2.5 mg total) by mouth 2 (two) times daily.    cabozantinib (CABOMETYX) 60 mg Tab TAKE ONE TABLET BY MOUTH ONCE DAILY AT THE SAME TIME ON AN EMPTY STOMACH AT LEAST 1 HOUR BEFORE OR 2 HOURS AFTER EATING. AVOID GRAPEFRUIT PRODUCTS    cloNIDine 0.3 mg/24 hr td ptwk (CATAPRES) 0.3 mg/24 hr Place 1 patch onto the skin every 7 days.    cyclobenzaprine (FLEXERIL) 5 MG tablet Take 1 tablet (5 mg total) by mouth daily as needed for Muscle spasms.    gabapentin (NEURONTIN) 100 MG capsule Take 1 capsule (100 mg total) by mouth once daily.    hydrALAZINE (APRESOLINE) 100 MG tablet Take 1 tablet (100 mg total) by mouth every 12 (twelve) hours.    HYDROcodone-acetaminophen (NORCO) 5-325 mg per tablet Take 1 tablet by mouth every 6 (six) hours as needed for Pain.    levothyroxine (SYNTHROID) 75 MCG tablet Take 1 tablet (75 mcg total) by mouth once daily.    lidocaine-prilocaine (EMLA) cream APPLY ATLEAST 30 MINUTES BEFORE TREATMENT 3 TIMES A WEEK    metoprolol succinate (TOPROL-XL) 100 MG 24 hr tablet Take 1/2 tablet (50mg) once daily    mv,Ca,min-folic acid-vit K1 (ONE-A-DAY WOMEN'S 50 PLUS) 400-20 mcg Tab Take 1 tablet by mouth once daily.     polyethylene glycol (GLYCOLAX) 17 gram/dose powder Take 17 g by mouth once daily.    promethazine (PHENERGAN) 25 MG tablet Take 1 tablet (25 mg total) by mouth every 6 (six) hours as needed for Nausea.    acetaminophen (TYLENOL) 500 MG tablet Take 500 mg by mouth every 6 (six) hours as needed for Pain.    naloxone (NARCAN) 1 mg/mL injection 2 mg (1 mg per nostril) by Nasal route as needed for opioid overdose; may repeat in 3 to 5 minutes if not effective. Call 911    [DISCONTINUED] amLODIPine (NORVASC) 5 MG tablet TAKE 1 TABLET BY MOUTH EVERY DAY    [DISCONTINUED] sacubitriL-valsartan (ENTRESTO) 49-51 mg per tablet Take 1 tablet by mouth 2 (two) times daily.     Family History       Problem Relation (Age of Onset)    Cataracts Maternal Aunt    Diabetes Father, Maternal Grandfather    Heart disease Sister    Hypertension Mother    Kidney disease Father    No Known Problems Sister, Sister, Brother, Brother, Maternal Uncle, Paternal Aunt, Paternal Uncle, Maternal Grandmother, Paternal Grandmother, Paternal Grandfather, Son, Son, Other          Tobacco Use    Smoking status: Never    Smokeless tobacco: Never   Substance and Sexual Activity    Alcohol use: No     Comment: . 2 children. works at [employer].    Drug use: No    Sexual activity: Yes     Partners: Male     Review of Systems   Constitutional:  Negative for chills and fever.   HENT:  Negative for nosebleeds and tinnitus.    Eyes:  Negative for photophobia and visual disturbance.   Respiratory:  Negative for shortness of breath and wheezing.    Cardiovascular:  Negative for chest pain, palpitations and leg swelling.   Gastrointestinal:  Negative for abdominal distention, nausea and vomiting.   Genitourinary:  Negative for dysuria, flank pain and hematuria.   Musculoskeletal:  Negative for gait problem and joint swelling.   Skin:  Positive for color change and wound. Negative for rash.   Neurological:  Negative for seizures and syncope.   Objective:      Vital Signs (Most Recent):  Temp: 99.9 °F (37.7 °C) (07/19/23 1500)  Pulse: 85 (07/19/23 1500)  Resp: 18 (07/19/23 1500)  BP: (!) 108/57 (07/19/23 1500)  SpO2: 96 % (07/19/23 1500) Vital Signs (24h Range):  Temp:  [99.9 °F (37.7 °C)] 99.9 °F (37.7 °C)  Pulse:  [85] 85  Resp:  [18] 18  SpO2:  [96 %] 96 %  BP: (108)/(57) 108/57     Weight: 46 kg (101 lb 6.6 oz)  Body mass index is 17.41 kg/m².     Physical Exam  Vitals and nursing note reviewed.   Constitutional:       General: She is not in acute distress.     Appearance: She is well-developed. She is not diaphoretic.   HENT:      Head: Normocephalic and atraumatic.      Right Ear: External ear normal.      Left Ear: External ear normal.   Eyes:      General:         Right eye: No discharge.         Left eye: No discharge.      Conjunctiva/sclera: Conjunctivae normal.   Neck:      Thyroid: No thyromegaly.   Cardiovascular:      Rate and Rhythm: Normal rate and regular rhythm.      Heart sounds: No murmur heard.  Pulmonary:      Effort: Pulmonary effort is normal. No respiratory distress.      Breath sounds: Normal breath sounds.   Abdominal:      General: Bowel sounds are normal. There is no distension.      Palpations: Abdomen is soft. There is no mass.      Tenderness: There is no abdominal tenderness.   Musculoskeletal:         General: No deformity.      Cervical back: Normal range of motion and neck supple.      Right lower leg: No edema.      Left lower leg: No edema.      Comments: Graft left arm with palpable thrill   Skin:     General: Skin is warm and dry.      Comments: Ulcer to middle toe of left foot, palpable dorsalis pedis pulse   Neurological:      Mental Status: She is alert and oriented to person, place, and time.      Sensory: No sensory deficit.   Psychiatric:         Mood and Affect: Mood normal.         Behavior: Behavior normal.              Significant Labs: CBC:   Recent Labs   Lab 07/19/23  1546   WBC 6.36   HGB 6.2*   HCT 19.8*   PLT  177     CMP:   Recent Labs   Lab 07/19/23  1546      K 4.3      CO2 23   GLU 93   BUN 65*   CREATININE 4.8*   CALCIUM 8.8   PROT 5.9*   ALBUMIN 2.4*   BILITOT 0.7   ALKPHOS 315*   AST 65*   ALT 40   ANIONGAP 13     Lactic Acid:   Recent Labs   Lab 07/19/23  1546   LACTATE 1.6       Significant Imaging:   Imaging Results              X-Ray Chest 1 View (In process)                      X-Ray Foot Complete Left (Final result)  Result time 07/19/23 15:37:29      Final result by Asad Martin MD (07/19/23 15:37:29)                   Impression:      As above.      Electronically signed by: Asad Martin  Date:    07/19/2023  Time:    15:37               Narrative:    EXAMINATION:  XR FOOT COMPLETE 3 VIEW LEFT    CLINICAL HISTORY:  .  Pain, unspecified    TECHNIQUE:  AP, lateral and oblique views of the left foot were performed.    COMPARISON:  Radiographs 02/14/2023    FINDINGS:  Diffuse osteopenia.  No acute fracture or dislocation.  Joint spaces are preserved.  Achilles enthesopathy and small plantar calcaneal spur.  There is soft tissue swelling in the dorsal ankle and forefoot, which is nonspecific.  Extensive vascular calcifications.                                        Assessment/Plan:     * Toe ulcer  Presents with ulcer to left toe after mechanical injury 1 month ago. Afebrile without leukocytosis. X-ray without fracture or dislocation  Podiatry consulted  Continue rocephin/vanc  Will check ABIs  Blood cultures pending    Hypothyroidism  Lab Results   Component Value Date    TSH 15.273 (H) 07/10/2023     Resume home synthroid has not been taking outpatient    PAF (paroxysmal atrial fibrillation)  Patient with Paroxysmal (<7 days) atrial fibrillation which is controlled currently with Beta Blocker.  Anticoagulation not indicated due to home eliquis held given anemia and Heme-positive stool in ED.    ICD (implantable cardioverter-defibrillator) in place - SubQ  Stable denies discharge    Anemia in  ESRD (end-stage renal disease)  Presents with hemoglobin of 6.2. previously 8 to 9 2 weeks ago. Denies active bleeding. Heme-positive in ED.   Type and screen and transfuse 1 unit  Will hold home eliquis as heme positive   GI consulted  Repeat CBC    Chronic combined systolic and diastolic congestive heart failure  Patient is identified as having Combined Systolic and Diastolic heart failure that is Chronic. CHF is currently controlled. Latest ECHO performed and demonstrates- Results for orders placed during the hospital encounter of 05/08/23    Echo Saline Bubble? No    Interpretation Summary  · The left ventricle is normal in size with mild eccentric hypertrophy and severely decreased systolic function.  · The estimated ejection fraction is 23%.  · There is severe left ventricular global hypokinesis.  · Grade II left ventricular diastolic dysfunction.  · Moderate left atrial enlargement.  · Normal right ventricular size with normal right ventricular systolic function.  · Moderate right atrial enlargement.  · There is mild aortic valve stenosis.  · Aortic valve area is 1.58 cm2; peak velocity is 2.01 m/s; mean gradient is 8 mmHg.  · Moderate mitral regurgitation.  · Moderate to severe tricuspid regurgitation.  · Mild pulmonic regurgitation.  · Elevated central venous pressure (15 mmHg).  · The estimated PA systolic pressure is 47 mmHg.  · There is mild-moderate pulmonary hypertension.  · Moderate posterolateral pericardial effusion. Trivial under the RA  . Continue Beta Blocker and ARNI and monitor clinical status closely. Monitor on telemetry. Patient is off CHF pathway.  Monitor strict Is&Os and daily weights.  Place on fluid restriction of 1 L. Cardiology has not been any consulted. Continue to stress to patient importance of self efficacy and  on diet for CHF. Last BNP reviewed- and noted below No results for input(s): BNP, BNPTRIAGEBLO in the last 168 hours..    ESRD (end stage renal disease) on  dialysis  Nephrology consulted for HD    History of kidney cancer  Followed by oncology outpatient. Home cabometyx held for last week due to diarrhea. C diff negative 7/2023 though previously positive. Continue outpatient oncology follow up    Essential hypertension  Hypotensive on arrival. Home hydralazine, held. Continue home metoprolol with hold parameters for HR and BP. will hold resuming clonidine patch      VTE Risk Mitigation (From admission, onward)         Ordered     IP VTE HIGH RISK PATIENT  Once         07/19/23 1747     Place sequential compression device  Until discontinued         07/19/23 1747     Place PING hose  Until discontinued         07/19/23 1747                 Discussed with ED physician.    VTE: PING/SCD  Code: Full  Diet: CLD  Dispo: pending GI/podiatry eval    On 07/19/2023, patient should be placed in hospital observation services under my care in collaboration with Leeroy Cheney MD.      SOPHIA AlanizC  Department of Hospital Medicine  [facility] - Emergency Dept

## 2023-07-19 NOTE — PROGRESS NOTES
Pharmacokinetic Initial Assessment: IV Vancomycin    Assessment/Plan:    Initiate intravenous vancomycin with loading dose of 1000 mg once with subsequent doses when random concentrations are less than 20 mcg/mL  Desired empiric serum trough concentration is 10 to 20 mcg/mL  Draw vancomycin random level on 7/20 at 0600.  Pharmacy will continue to follow and monitor vancomycin.      Please contact pharmacy at extension [phone] with any questions regarding this assessment.     Thank you for the consult,   Trice Matthew       Patient brief summary:  Eva Bloom is a 57 y.o. female initiated on antimicrobial therapy with IV Vancomycin for treatment of suspected skin & soft tissue infection    Drug Allergies:   Review of patient's allergies indicates:   Allergen Reactions    Coreg [carvedilol] Other (See Comments)     Nausea/vomiting    Allopurinol      Other reaction(s): abnormal transaminases       Actual Body Weight:   46 kg    Renal Function:   Estimated Creatinine Clearance: 9.3 mL/min (A) (based on SCr of 4.8 mg/dL (H)).,     Dialysis Method (if applicable):  intermittent HD    CBC (last 72 hours):  Recent Labs   Lab Result Units 07/19/23  1546   WBC K/uL 6.36   Hemoglobin g/dL 6.2*   Hematocrit % 19.8*   Platelets K/uL 177   Gran % % 59.4   Lymph % % 20.6   Mono % % 15.9*   Eosinophil % % 2.7   Basophil % % 0.6   Differential Method  Automated       Metabolic Panel (last 72 hours):  Recent Labs   Lab Result Units 07/19/23  1546   Sodium mmol/L 143   Potassium mmol/L 4.3   Chloride mmol/L 107   CO2 mmol/L 23   Glucose mg/dL 93   BUN mg/dL 65*   Creatinine mg/dL 4.8*   Albumin g/dL 2.4*   Total Bilirubin mg/dL 0.7   Alkaline Phosphatase U/L 315*   AST U/L 65*   ALT U/L 40       Drug levels (last 3 results):  No results for input(s): VANCOMYCINRA, VANCORANDOM, VANCOMYCINPE, VANCOPEAK, VANCOMYCINTR, VANCOTROUGH in the last 72 hours.    Microbiologic Results:  Microbiology Results (last 7 days)       Procedure  Component Value Units Date/Time    Blood culture x two cultures. Draw prior to antibiotics. [[id number]] Collected: 07/19/23 1602    Order Status: Sent Specimen: Blood from Peripheral, Forearm, Right Updated: 07/19/23 1618    Blood culture x two cultures. Draw prior to antibiotics. [[id number]] Collected: 07/19/23 1547    Order Status: Sent Specimen: Blood from Peripheral, Forearm, Right Updated: 07/19/23 1552    Aerobic culture (Specify Source) **CANNOT BE ORDERED AS STAT** [[id number]]     Order Status: No result Specimen: Wound from Toe, Left Foot

## 2023-07-19 NOTE — TELEPHONE ENCOUNTER
----- Message from Cielo Horton sent at 7/19/2023  1:31 PM CDT -----  Regarding: Rose [phone]  Type: Patient Call Back    Who called: [facility]     What is the request in detail:her left foot 3rd toe is currently wound open pt has 4 edema, wound is 1x1.3 pt is chs pt with a fib and HTN, seeking further advice, should pt go to ED or referral to podiatry to be seen asap?     Can the clinic reply by [facility]? no    Would the patient rather a call back or a response via [facility]? Call back     Best call back number:  [phone]

## 2023-07-19 NOTE — SUBJECTIVE & OBJECTIVE
Past Medical History:   Diagnosis Date    Anemia     Bronchitis 03/01/2017    Cancer 2016    kidney cancer    CHF (congestive heart failure), NYHA class II, chronic, systolic     CMV (cytomegalovirus) antibody positive     ESRD (end stage renal disease)     Essential hypertension 09/23/2015    H/O herpes simplex type 2 infection     Herpes simplex type 1 antibody positive     History of kidney cancer     s/p left nephrectomy 1/2016    Hyperparathyroidism, unspecified     Hyperuricemia without signs of inflammatory arthritis and tophaceous disease     Kidney stones     LGSIL (low grade squamous intraepithelial dysplasia)     Myocardiopathy 07/21/2017    Prediabetes     Proteinuria     Renal disorder     Thyroid nodule     Urate nephropathy        Past Surgical History:   Procedure Laterality Date    BREAST CYST EXCISION      COLONOSCOPY N/A 11/12/2015    COLONOSCOPY N/A 3/12/2021    Procedure: COLONOSCOPY;  Surgeon: Brendon Lanier MD;  Location: [facility] ENDO;  Service: Endoscopy;  Laterality: N/A;  covid test 3/9, labs prior, prep instr mailed -ml    INSERTION OF BIVENTRICULAR IMPLANTABLE CARDIOVERTER-DEFIBRILLATOR (ICD)  04/2021    INSERTION OF TUNNELED CENTRAL VENOUS CATHETER (CVC) WITH SUBCUTANEOUS PORT N/A 1/25/2023    Procedure: INSERTION, DUAL LUMEN CATHETER WITH PORT, WITH IMAGING GUIDANCE;  Surgeon: FARIDEH Muñoz III, MD;  Location: [address];  Service: Peripheral Vascular;  Laterality: N/A;  possinble permcath placment    NEPHRECTOMY-LAPAROSCOPIC Left 01/12/2016    PERCUTANEOUS TRANSLUMINAL ANGIOPLASTY OF ARTERIOVENOUS FISTULA Left 4/19/2023    Procedure: PTA, AV FISTULA;  Surgeon: FARIDEH Muñoz III, MD;  Location: [facility] CATH LAB;  Service: Peripheral Vascular;  Laterality: Left;    PERITONEAL CATHETER INSERTION      Permacath insertion  01/12/2017    PLACEMENT OF ARTERIOVENOUS GRAFT  12/28/2022    Procedure: INSERTION, GRAFT, ARTERIOVENOUS;  Surgeon: FARIDEH Muñoz III, MD;  Location: [facility] OR  2ND FLR;  Service: Peripheral Vascular;;    REMOVAL, GRAFT, ARTERIOVENOUS, UPPER EXTREMITY Left 1/25/2023    Procedure: REMOVAL, GRAFT, ARTERIOVENOUS, UPPER EXTREMITY;  Surgeon: FARIDEH Muñoz III, MD;  Location: [facility] OR 2ND FLR;  Service: Peripheral Vascular;  Laterality: Left;    REVISION OF ARTERIOVENOUS FISTULA Left 5/1/2023    Procedure: REVISION, AV FISTULA;  Surgeon: FARIDEH Muñoz III, MD;  Location: [facility] OR 2ND FLR;  Service: Peripheral Vascular;  Laterality: Left;  LUE AVG revision    REVISION OF PROCEDURE INVOLVING ARTERIOVENOUS GRAFT Left 12/28/2022    Procedure: REVISION, PROCEDURE INVOLVING ARTERIOVENOUS GRAFT;  Surgeon: FARIDEH Muñoz III, MD;  Location: [facility] OR 2ND FLR;  Service: Peripheral Vascular;  Laterality: Left;    SALPINGOOPHORECTOMY Right 2016    KJB---DAVINCI    TONSILLECTOMY      TUBAL LIGATION         Review of patient's allergies indicates:   Allergen Reactions    Coreg [carvedilol] Other (See Comments)     Nausea/vomiting    Allopurinol      Other reaction(s): abnormal transaminases       Current Facility-Administered Medications on File Prior to Encounter   Medication    0.9%  NaCl infusion     Current Outpatient Medications on File Prior to Encounter   Medication Sig    apixaban (ELIQUIS) 2.5 mg Tab Take 1 tablet (2.5 mg total) by mouth 2 (two) times daily.    cabozantinib (CABOMETYX) 60 mg Tab TAKE ONE TABLET BY MOUTH ONCE DAILY AT THE SAME TIME ON AN EMPTY STOMACH AT LEAST 1 HOUR BEFORE OR 2 HOURS AFTER EATING. AVOID GRAPEFRUIT PRODUCTS    cloNIDine 0.3 mg/24 hr td ptwk (CATAPRES) 0.3 mg/24 hr Place 1 patch onto the skin every 7 days.    cyclobenzaprine (FLEXERIL) 5 MG tablet Take 1 tablet (5 mg total) by mouth daily as needed for Muscle spasms.    gabapentin (NEURONTIN) 100 MG capsule Take 1 capsule (100 mg total) by mouth once daily.    hydrALAZINE (APRESOLINE) 100 MG tablet Take 1 tablet (100 mg total) by mouth every 12 (twelve) hours.    HYDROcodone-acetaminophen (NORCO)  5-325 mg per tablet Take 1 tablet by mouth every 6 (six) hours as needed for Pain.    levothyroxine (SYNTHROID) 75 MCG tablet Take 1 tablet (75 mcg total) by mouth once daily.    lidocaine-prilocaine (EMLA) cream APPLY ATLEAST 30 MINUTES BEFORE TREATMENT 3 TIMES A WEEK    metoprolol succinate (TOPROL-XL) 100 MG 24 hr tablet Take 1/2 tablet (50mg) once daily    mv,Ca,min-folic acid-vit K1 (ONE-A-DAY WOMEN'S 50 PLUS) 400-20 mcg Tab Take 1 tablet by mouth once daily.    polyethylene glycol (GLYCOLAX) 17 gram/dose powder Take 17 g by mouth once daily.    promethazine (PHENERGAN) 25 MG tablet Take 1 tablet (25 mg total) by mouth every 6 (six) hours as needed for Nausea.    acetaminophen (TYLENOL) 500 MG tablet Take 500 mg by mouth every 6 (six) hours as needed for Pain.    naloxone (NARCAN) 1 mg/mL injection 2 mg (1 mg per nostril) by Nasal route as needed for opioid overdose; may repeat in 3 to 5 minutes if not effective. Call 911    [DISCONTINUED] amLODIPine (NORVASC) 5 MG tablet TAKE 1 TABLET BY MOUTH EVERY DAY    [DISCONTINUED] sacubitriL-valsartan (ENTRESTO) 49-51 mg per tablet Take 1 tablet by mouth 2 (two) times daily.     Family History       Problem Relation (Age of Onset)    Cataracts Maternal Aunt    Diabetes Father, Maternal Grandfather    Heart disease Sister    Hypertension Mother    Kidney disease Father    No Known Problems Sister, Sister, Brother, Brother, Maternal Uncle, Paternal Aunt, Paternal Uncle, Maternal Grandmother, Paternal Grandmother, Paternal Grandfather, Son, Son, Other          Tobacco Use    Smoking status: Never    Smokeless tobacco: Never   Substance and Sexual Activity    Alcohol use: No     Comment: . 2 children. works at [employer].    Drug use: No    Sexual activity: Yes     Partners: Male     Review of Systems   Constitutional:  Negative for chills and fever.   HENT:  Negative for nosebleeds and tinnitus.    Eyes:  Negative for photophobia and visual disturbance.   Respiratory:   Negative for shortness of breath and wheezing.    Cardiovascular:  Negative for chest pain, palpitations and leg swelling.   Gastrointestinal:  Negative for abdominal distention, nausea and vomiting.   Genitourinary:  Negative for dysuria, flank pain and hematuria.   Musculoskeletal:  Negative for gait problem and joint swelling.   Skin:  Positive for color change and wound. Negative for rash.   Neurological:  Negative for seizures and syncope.   Objective:     Vital Signs (Most Recent):  Temp: 99.9 °F (37.7 °C) (07/19/23 1500)  Pulse: 85 (07/19/23 1500)  Resp: 18 (07/19/23 1500)  BP: (!) 108/57 (07/19/23 1500)  SpO2: 96 % (07/19/23 1500) Vital Signs (24h Range):  Temp:  [99.9 °F (37.7 °C)] 99.9 °F (37.7 °C)  Pulse:  [85] 85  Resp:  [18] 18  SpO2:  [96 %] 96 %  BP: (108)/(57) 108/57     Weight: 46 kg (101 lb 6.6 oz)  Body mass index is 17.41 kg/m².     Physical Exam  Vitals and nursing note reviewed.   Constitutional:       General: She is not in acute distress.     Appearance: She is well-developed. She is not diaphoretic.   HENT:      Head: Normocephalic and atraumatic.      Right Ear: External ear normal.      Left Ear: External ear normal.   Eyes:      General:         Right eye: No discharge.         Left eye: No discharge.      Conjunctiva/sclera: Conjunctivae normal.   Neck:      Thyroid: No thyromegaly.   Cardiovascular:      Rate and Rhythm: Normal rate and regular rhythm.      Heart sounds: No murmur heard.  Pulmonary:      Effort: Pulmonary effort is normal. No respiratory distress.      Breath sounds: Normal breath sounds.   Abdominal:      General: Bowel sounds are normal. There is no distension.      Palpations: Abdomen is soft. There is no mass.      Tenderness: There is no abdominal tenderness.   Musculoskeletal:         General: No deformity.      Cervical back: Normal range of motion and neck supple.      Right lower leg: No edema.      Left lower leg: No edema.      Comments: Graft left arm with  palpable thrill   Skin:     General: Skin is warm and dry.      Comments: Ulcer to middle toe of left foot, palpable dorsalis pedis pulse   Neurological:      Mental Status: She is alert and oriented to person, place, and time.      Sensory: No sensory deficit.   Psychiatric:         Mood and Affect: Mood normal.         Behavior: Behavior normal.              Significant Labs: CBC:   Recent Labs   Lab 07/19/23  1546   WBC 6.36   HGB 6.2*   HCT 19.8*        CMP:   Recent Labs   Lab 07/19/23  1546      K 4.3      CO2 23   GLU 93   BUN 65*   CREATININE 4.8*   CALCIUM 8.8   PROT 5.9*   ALBUMIN 2.4*   BILITOT 0.7   ALKPHOS 315*   AST 65*   ALT 40   ANIONGAP 13     Lactic Acid:   Recent Labs   Lab 07/19/23  1546   LACTATE 1.6       Significant Imaging:   Imaging Results              X-Ray Chest 1 View (In process)                      X-Ray Foot Complete Left (Final result)  Result time 07/19/23 15:37:29      Final result by Asad Martin MD (07/19/23 15:37:29)                   Impression:      As above.      Electronically signed by: Asad Martin  Date:    07/19/2023  Time:    15:37               Narrative:    EXAMINATION:  XR FOOT COMPLETE 3 VIEW LEFT    CLINICAL HISTORY:  .  Pain, unspecified    TECHNIQUE:  AP, lateral and oblique views of the left foot were performed.    COMPARISON:  Radiographs 02/14/2023    FINDINGS:  Diffuse osteopenia.  No acute fracture or dislocation.  Joint spaces are preserved.  Achilles enthesopathy and small plantar calcaneal spur.  There is soft tissue swelling in the dorsal ankle and forefoot, which is nonspecific.  Extensive vascular calcifications.

## 2023-07-19 NOTE — Clinical Note
Left: Chest.   Scrubbed with Chlorhexidine/Alcohol.    Hair: N/A.  Skin prep dry before draping.  Prepped by: Kraig Rai, RT 7/21/2023 4:18 PM.

## 2023-07-19 NOTE — ASSESSMENT & PLAN NOTE
Followed by oncology outpatient. Home cabometyx held for last week due to diarrhea. C diff negative 7/2023 though previously positive. Continue outpatient oncology follow up

## 2023-07-19 NOTE — Clinical Note
Left: Leg.   Scrubbed with Chlorhexidine/Alcohol.    Hair: N/A.  Skin prep dry before draping.  Prepped by: Carla Santana, RT 7/25/2023 8:41 AM.

## 2023-07-19 NOTE — PHARMACY MED REC
"Admission Medication History     The home medication history was taken by Olivia Grey.    You may go to "Admission" then "Reconcile Home Medications" tabs to review and/or act upon these items.     The home medication list has been updated by the Pharmacy department.   Please read ALL comments highlighted in yellow.   Please address this information as you see fit.    Feel free to contact us if you have any questions or require assistance.    Medications listed below were obtained from: Patient/family and Analytic [employer]  (Not in a hospital admission)          Olivia Grey  [phone]                 .        "

## 2023-07-19 NOTE — ED TRIAGE NOTES
Pt to ED for evaluation and treatment of nonhealing wound to the 3rd toe on the left foot. Pmhx kidney cancer, CHF, Afib, ESRD (hemodialysis on T, Th, Sa), and HTN. Pt AAOx4, BIB wheelchair, VSS,  at bedside.

## 2023-07-19 NOTE — HPI
Eva Lopez Walker 57 y.o. female with afib on eliquis, HTN, CHF, ESRD on HD T/TH/S, RCC presents to the hospital chief complaint of toe wound.  She reports 1 month an ulcer to left toe that occurred after a chair rolled over foot.  He is had increasing pain to the toe described as a throbbing.  She attempted treatment at home with ice without improvement.  She is dialyzed Tuesday Thursday Saturday at [facility] in [place].  She no longer makes urine.  She denies fever chest pain shortness breast nausea vomiting abdominal pain melena hematuria hematemesis dizziness syncope.    In the ED, afebrile without leukocytosis x-ray foot without fracture or dislocation potassium 4.3 lactic acid negative heme-positive stool.

## 2023-07-19 NOTE — ASSESSMENT & PLAN NOTE
Presents with ulcer to left toe after mechanical injury 1 month ago. Afebrile without leukocytosis. X-ray without fracture or dislocation  Podiatry consulted  Continue rocephin/vanc  Will check ABIs  Blood cultures pending

## 2023-07-20 ENCOUNTER — ANESTHESIA EVENT (OUTPATIENT)
Dept: SURGERY | Facility: HOSPITAL | Age: 58
DRG: 252 | End: 2023-07-20
Payer: MEDICARE

## 2023-07-20 PROBLEM — I70.262 CRITICAL LIMB ISCHEMIA OF LEFT LOWER EXTREMITY WITH GANGRENE: Status: ACTIVE | Noted: 2023-07-20

## 2023-07-20 LAB
ALBUMIN SERPL BCP-MCNC: 2.7 G/DL (ref 3.5–5.2)
ALP SERPL-CCNC: 397 U/L (ref 55–135)
ALT SERPL W/O P-5'-P-CCNC: 47 U/L (ref 10–44)
ANION GAP SERPL CALC-SCNC: 14 MMOL/L (ref 8–16)
AST SERPL-CCNC: 75 U/L (ref 10–40)
BASOPHILS # BLD AUTO: 0.05 K/UL (ref 0–0.2)
BASOPHILS NFR BLD: 0.6 % (ref 0–1.9)
BILIRUB SERPL-MCNC: 1.1 MG/DL (ref 0.1–1)
BLD PROD TYP BPU: NORMAL
BLOOD UNIT EXPIRATION DATE: NORMAL
BLOOD UNIT TYPE CODE: 6200
BLOOD UNIT TYPE: NORMAL
BUN SERPL-MCNC: 76 MG/DL (ref 6–20)
CALCIUM SERPL-MCNC: 9.3 MG/DL (ref 8.7–10.5)
CHLORIDE SERPL-SCNC: 106 MMOL/L (ref 95–110)
CO2 SERPL-SCNC: 23 MMOL/L (ref 23–29)
CODING SYSTEM: NORMAL
CREAT SERPL-MCNC: 5.5 MG/DL (ref 0.5–1.4)
CROSSMATCH INTERPRETATION: NORMAL
DIFFERENTIAL METHOD: ABNORMAL
DISPENSE STATUS: NORMAL
EOSINOPHIL # BLD AUTO: 0.3 K/UL (ref 0–0.5)
EOSINOPHIL NFR BLD: 3.3 % (ref 0–8)
ERYTHROCYTE [DISTWIDTH] IN BLOOD BY AUTOMATED COUNT: 18.9 % (ref 11.5–14.5)
EST. GFR  (NO RACE VARIABLE): 8 ML/MIN/1.73 M^2
GLUCOSE SERPL-MCNC: 87 MG/DL (ref 70–110)
HCT VFR BLD AUTO: 24.3 % (ref 37–48.5)
HGB BLD-MCNC: 7.8 G/DL (ref 12–16)
IMM GRANULOCYTES # BLD AUTO: 0.04 K/UL (ref 0–0.04)
IMM GRANULOCYTES NFR BLD AUTO: 0.5 % (ref 0–0.5)
LYMPHOCYTES # BLD AUTO: 1.7 K/UL (ref 1–4.8)
LYMPHOCYTES NFR BLD: 21.3 % (ref 18–48)
MAGNESIUM SERPL-MCNC: 2.6 MG/DL (ref 1.6–2.6)
MCH RBC QN AUTO: 33.1 PG (ref 27–31)
MCHC RBC AUTO-ENTMCNC: 32.1 G/DL (ref 32–36)
MCV RBC AUTO: 103 FL (ref 82–98)
MONOCYTES # BLD AUTO: 1 K/UL (ref 0.3–1)
MONOCYTES NFR BLD: 12 % (ref 4–15)
NEUTROPHILS # BLD AUTO: 5.1 K/UL (ref 1.8–7.7)
NEUTROPHILS NFR BLD: 62.3 % (ref 38–73)
NRBC BLD-RTO: 0 /100 WBC
O+P STL MICRO: NORMAL
PHOSPHATE SERPL-MCNC: 5.3 MG/DL (ref 2.7–4.5)
PLATELET # BLD AUTO: 182 K/UL (ref 150–450)
PMV BLD AUTO: 10.1 FL (ref 9.2–12.9)
POTASSIUM SERPL-SCNC: 4.8 MMOL/L (ref 3.5–5.1)
PROT SERPL-MCNC: 6.6 G/DL (ref 6–8.4)
RBC # BLD AUTO: 2.36 M/UL (ref 4–5.4)
SODIUM SERPL-SCNC: 143 MMOL/L (ref 136–145)
TRANS ERYTHROCYTES VOL PATIENT: NORMAL ML
VANCOMYCIN SERPL-MCNC: 17.2 UG/ML
VANCOMYCIN SERPL-MCNC: 17.7 UG/ML
WBC # BLD AUTO: 8.14 K/UL (ref 3.9–12.7)

## 2023-07-20 PROCEDURE — 25000003 PHARM REV CODE 250: Performed by: INTERNAL MEDICINE

## 2023-07-20 PROCEDURE — P9021 RED BLOOD CELLS UNIT: HCPCS | Performed by: SURGERY

## 2023-07-20 PROCEDURE — 83735 ASSAY OF MAGNESIUM: CPT | Performed by: PHYSICIAN ASSISTANT

## 2023-07-20 PROCEDURE — 85025 COMPLETE CBC W/AUTO DIFF WBC: CPT | Performed by: PHYSICIAN ASSISTANT

## 2023-07-20 PROCEDURE — 99233 PR SUBSEQUENT HOSPITAL CARE,LEVL III: ICD-10-PCS | Mod: 57,,, | Performed by: SURGERY

## 2023-07-20 PROCEDURE — 99222 PR INITIAL HOSPITAL CARE,LEVL II: ICD-10-PCS | Mod: GW,ICN,CMP, | Performed by: PODIATRIST

## 2023-07-20 PROCEDURE — 25000003 PHARM REV CODE 250: Performed by: PHYSICIAN ASSISTANT

## 2023-07-20 PROCEDURE — 99223 PR INITIAL HOSPITAL CARE,LEVL III: ICD-10-PCS | Mod: ,,, | Performed by: NURSE PRACTITIONER

## 2023-07-20 PROCEDURE — 25000003 PHARM REV CODE 250: Performed by: HOSPITALIST

## 2023-07-20 PROCEDURE — 80100016 HC MAINTENANCE HEMODIALYSIS

## 2023-07-20 PROCEDURE — 99223 1ST HOSP IP/OBS HIGH 75: CPT | Mod: ,,, | Performed by: NURSE PRACTITIONER

## 2023-07-20 PROCEDURE — 84100 ASSAY OF PHOSPHORUS: CPT | Performed by: PHYSICIAN ASSISTANT

## 2023-07-20 PROCEDURE — 36415 COLL VENOUS BLD VENIPUNCTURE: CPT | Performed by: PHYSICIAN ASSISTANT

## 2023-07-20 PROCEDURE — 87186 SC STD MICRODIL/AGAR DIL: CPT | Performed by: EMERGENCY MEDICINE

## 2023-07-20 PROCEDURE — 99222 1ST HOSP IP/OBS MODERATE 55: CPT | Mod: GW,ICN,CMP, | Performed by: PODIATRIST

## 2023-07-20 PROCEDURE — 80202 ASSAY OF VANCOMYCIN: CPT | Mod: 91 | Performed by: PHYSICIAN ASSISTANT

## 2023-07-20 PROCEDURE — 63600175 PHARM REV CODE 636 W HCPCS: Performed by: HOSPITALIST

## 2023-07-20 PROCEDURE — 63600175 PHARM REV CODE 636 W HCPCS: Performed by: PHYSICIAN ASSISTANT

## 2023-07-20 PROCEDURE — 87070 CULTURE OTHR SPECIMN AEROBIC: CPT | Performed by: EMERGENCY MEDICINE

## 2023-07-20 PROCEDURE — 11000001 HC ACUTE MED/SURG PRIVATE ROOM

## 2023-07-20 PROCEDURE — 80053 COMPREHEN METABOLIC PANEL: CPT | Performed by: PHYSICIAN ASSISTANT

## 2023-07-20 PROCEDURE — 99233 SBSQ HOSP IP/OBS HIGH 50: CPT | Mod: 57,,, | Performed by: SURGERY

## 2023-07-20 PROCEDURE — 87077 CULTURE AEROBIC IDENTIFY: CPT | Performed by: EMERGENCY MEDICINE

## 2023-07-20 RX ORDER — HYDROCODONE BITARTRATE AND ACETAMINOPHEN 500; 5 MG/1; MG/1
TABLET ORAL
Status: DISCONTINUED | OUTPATIENT
Start: 2023-07-20 | End: 2023-07-29 | Stop reason: HOSPADM

## 2023-07-20 RX ADMIN — CEFTRIAXONE 1 G: 1 INJECTION, POWDER, FOR SOLUTION INTRAMUSCULAR; INTRAVENOUS at 06:07

## 2023-07-20 RX ADMIN — VANCOMYCIN HYDROCHLORIDE 500 MG: 500 INJECTION, POWDER, LYOPHILIZED, FOR SOLUTION INTRAVENOUS at 05:07

## 2023-07-20 RX ADMIN — MUPIROCIN: 20 OINTMENT TOPICAL at 01:07

## 2023-07-20 RX ADMIN — HYDROCODONE BITARTRATE AND ACETAMINOPHEN 1 TABLET: 5; 325 TABLET ORAL at 10:07

## 2023-07-20 RX ADMIN — METOPROLOL SUCCINATE 50 MG: 50 TABLET, EXTENDED RELEASE ORAL at 05:07

## 2023-07-20 RX ADMIN — LEVOTHYROXINE SODIUM 75 MCG: 0.03 TABLET ORAL at 09:07

## 2023-07-20 NOTE — PROGRESS NOTES
[facility] Medicine  Progress Note    Patient Name: Eva Bloom  MRN: [id number]  Patient Class: IP- Inpatient   Admission Date: 7/19/2023  Length of Stay: 0 days  Attending Physician: José Miguel Crisitna MD  Primary Care Provider: Sowmya Mccain MD        Subjective:     Principal Problem:Toe ulcer        HPI:  Eva Bloom 57 y.o. female with afib on eliquis, HTN, CHF, ESRD on HD T/TH/S, RCC presents to the hospital chief complaint of toe wound.  She reports 1 month an ulcer to left toe that occurred after a chair rolled over foot.  He is had increasing pain to the toe described as a throbbing.  She attempted treatment at home with ice without improvement.  She is dialyzed Tuesday Thursday Saturday at [facility].  She no longer makes urine.  She denies fever chest pain shortness breast nausea vomiting abdominal pain melena hematuria hematemesis dizziness syncope.    In the ED, afebrile without leukocytosis x-ray foot without fracture or dislocation potassium 4.3 lactic acid negative heme-positive stool.      Overview/Hospital Course:  Eva Bloom 57 y.o. female with afib on eliquis, HTN, CHF, ESRD on HD T/TH/S, RCC presents to the hospital chief complaint of toe wound.  She reports 1 month an ulcer to left toe that occurred after a chair rolled over foot.  He is had increasing pain to the toe described as a throbbing.  She attempted treatment at home with ice without improvement.  She is dialyzed Tuesday Thursday Saturday at [facility] in [place].  She no longer makes urine.  She denies fever chest pain shortness breast nausea vomiting abdominal pain melena hematuria hematemesis dizziness syncope.    In the ED, afebrile without leukocytosis x-ray foot without fracture or dislocation potassium 4.3 lactic acid negative heme-positive stool.podiatry is consulted  and no plan for intervention until PAD is addressed by vascular surgery,US arterial is ordered.  Per GI patient  has hemorrhoid,started on hydrocortisone,eliquis on hold at this time.      Past Medical History:   Diagnosis Date    Anemia     Atrial fibrillation     Bronchitis 03/01/2017    Cancer 2016    kidney cancer    CHF (congestive heart failure), NYHA class II, chronic, systolic     CMV (cytomegalovirus) antibody positive     Encounter for blood transfusion     ESRD (end stage renal disease)     Essential hypertension 09/23/2015    H/O herpes simplex type 2 infection     Herpes simplex type 1 antibody positive     History of kidney cancer     s/p left nephrectomy 1/2016    Hyperparathyroidism, unspecified     Hyperuricemia without signs of inflammatory arthritis and tophaceous disease     Kidney stones     LGSIL (low grade squamous intraepithelial dysplasia)     Myocardiopathy 07/21/2017    Prediabetes     Proteinuria     Renal disorder     Thyroid nodule     Urate nephropathy        Past Surgical History:   Procedure Laterality Date    BREAST CYST EXCISION      COLONOSCOPY N/A 11/12/2015    COLONOSCOPY N/A 3/12/2021    Procedure: COLONOSCOPY;  Surgeon: Brendon Lanier MD;  Location: [facility] ENDO;  Service: Endoscopy;  Laterality: N/A;  covid test 3/9, labs prior, prep instr mailed -ml    INSERTION OF BIVENTRICULAR IMPLANTABLE CARDIOVERTER-DEFIBRILLATOR (ICD)  04/2021    INSERTION OF TUNNELED CENTRAL VENOUS CATHETER (CVC) WITH SUBCUTANEOUS PORT N/A 1/25/2023    Procedure: INSERTION, DUAL LUMEN CATHETER WITH PORT, WITH IMAGING GUIDANCE;  Surgeon: FARIDEH Muñoz III, MD;  Location: [facility] OR [address] [place] [address];  Service: Peripheral Vascular;  Laterality: N/A;  possinble permcath placment    NEPHRECTOMY-LAPAROSCOPIC Left 01/12/2016    PERCUTANEOUS TRANSLUMINAL ANGIOPLASTY OF ARTERIOVENOUS FISTULA Left 4/19/2023    Procedure: PTA, AV FISTULA;  Surgeon: FARIDEH Muñoz III, MD;  Location: [facility] CATH LAB;  Service: Peripheral Vascular;  Laterality: Left;    PERITONEAL CATHETER INSERTION      Permacath  insertion  01/12/2017    PLACEMENT OF ARTERIOVENOUS GRAFT  12/28/2022    Procedure: INSERTION, GRAFT, ARTERIOVENOUS;  Surgeon: FARIDEH Muñoz III, MD;  Location: [facility] OR 2ND FLR;  Service: Peripheral Vascular;;    REMOVAL, GRAFT, ARTERIOVENOUS, UPPER EXTREMITY Left 1/25/2023    Procedure: REMOVAL, GRAFT, ARTERIOVENOUS, UPPER EXTREMITY;  Surgeon: FARIDEH Muñoz III, MD;  Location: [facility] OR 2ND FLR;  Service: Peripheral Vascular;  Laterality: Left;    REVISION OF ARTERIOVENOUS FISTULA Left 5/1/2023    Procedure: REVISION, AV FISTULA;  Surgeon: FARIDEH Muñoz III, MD;  Location: [facility] OR 2ND FLR;  Service: Peripheral Vascular;  Laterality: Left;  LUE AVG revision    REVISION OF PROCEDURE INVOLVING ARTERIOVENOUS GRAFT Left 12/28/2022    Procedure: REVISION, PROCEDURE INVOLVING ARTERIOVENOUS GRAFT;  Surgeon: FARIDEH Muñoz III, MD;  Location: [facility] OR 2ND FLR;  Service: Peripheral Vascular;  Laterality: Left;    SALPINGOOPHORECTOMY Right 2016    KJB---DAVINCI    TONSILLECTOMY      TUBAL LIGATION         Review of patient's allergies indicates:   Allergen Reactions    Coreg [carvedilol] Other (See Comments)     Nausea/vomiting    Allopurinol      Other reaction(s): abnormal transaminases       Current Facility-Administered Medications on File Prior to Encounter   Medication    0.9%  NaCl infusion     Current Outpatient Medications on File Prior to Encounter   Medication Sig    apixaban (ELIQUIS) 2.5 mg Tab Take 1 tablet (2.5 mg total) by mouth 2 (two) times daily.    cabozantinib (CABOMETYX) 60 mg Tab TAKE ONE TABLET BY MOUTH ONCE DAILY AT THE SAME TIME ON AN EMPTY STOMACH AT LEAST 1 HOUR BEFORE OR 2 HOURS AFTER EATING. AVOID GRAPEFRUIT PRODUCTS    cloNIDine 0.3 mg/24 hr td ptwk (CATAPRES) 0.3 mg/24 hr Place 1 patch onto the skin every 7 days.    cyclobenzaprine (FLEXERIL) 5 MG tablet Take 1 tablet (5 mg total) by mouth daily as needed for Muscle spasms.    gabapentin (NEURONTIN) 100 MG capsule Take 1  capsule (100 mg total) by mouth once daily.    hydrALAZINE (APRESOLINE) 100 MG tablet Take 1 tablet (100 mg total) by mouth every 12 (twelve) hours.    HYDROcodone-acetaminophen (NORCO) 5-325 mg per tablet Take 1 tablet by mouth every 6 (six) hours as needed for Pain.    levothyroxine (SYNTHROID) 75 MCG tablet Take 1 tablet (75 mcg total) by mouth once daily.    lidocaine-prilocaine (EMLA) cream APPLY ATLEAST 30 MINUTES BEFORE TREATMENT 3 TIMES A WEEK    metoprolol succinate (TOPROL-XL) 100 MG 24 hr tablet Take 1/2 tablet (50mg) once daily    mv,Ca,min-folic acid-vit K1 (ONE-A-DAY WOMEN'S 50 PLUS) 400-20 mcg Tab Take 1 tablet by mouth once daily.    polyethylene glycol (GLYCOLAX) 17 gram/dose powder Take 17 g by mouth once daily.    promethazine (PHENERGAN) 25 MG tablet Take 1 tablet (25 mg total) by mouth every 6 (six) hours as needed for Nausea.    acetaminophen (TYLENOL) 500 MG tablet Take 500 mg by mouth every 6 (six) hours as needed for Pain.    naloxone (NARCAN) 1 mg/mL injection 2 mg (1 mg per nostril) by Nasal route as needed for opioid overdose; may repeat in 3 to 5 minutes if not effective. Call 911    [DISCONTINUED] amLODIPine (NORVASC) 5 MG tablet TAKE 1 TABLET BY MOUTH EVERY DAY     Family History       Problem Relation (Age of Onset)    Cataracts Maternal Aunt    Diabetes Father, Maternal Grandfather    Heart disease Sister    Hypertension Mother    Kidney disease Father    No Known Problems Sister, Sister, Brother, Brother, Maternal Uncle, Paternal Aunt, Paternal Uncle, Maternal Grandmother, Paternal Grandmother, Paternal Grandfather, Son, Son, Other          Tobacco Use    Smoking status: Never    Smokeless tobacco: Never   Substance and Sexual Activity    Alcohol use: No     Comment: . 2 children. works at [employer].    Drug use: Never    Sexual activity: Not on file     Review of Systems   Constitutional:  Negative for chills and fever.   HENT:  Negative for nosebleeds and  tinnitus.    Eyes:  Negative for photophobia and visual disturbance.   Respiratory:  Negative for shortness of breath and wheezing.    Cardiovascular:  Negative for chest pain, palpitations and leg swelling.   Gastrointestinal:  Negative for abdominal distention, nausea and vomiting.   Genitourinary:  Negative for dysuria, flank pain and hematuria.   Musculoskeletal:  Negative for gait problem and joint swelling.   Skin:  Positive for color change and wound. Negative for rash.   Neurological:  Negative for seizures and syncope.   Objective:     Vital Signs (Most Recent):  Temp: 98.3 °F (36.8 °C) (07/20/23 0722)  Pulse: 93 (07/20/23 0722)  Resp: 17 (07/20/23 0722)  BP: (!) 143/69 (07/20/23 0722)  SpO2: 96 % (07/20/23 0722) Vital Signs (24h Range):  Temp:  [97.9 °F (36.6 °C)-99.9 °F (37.7 °C)] 98.3 °F (36.8 °C)  Pulse:  [] 93  Resp:  [17-20] 17  SpO2:  [96 %-100 %] 96 %  BP: (108-161)/(57-81) 143/69     Weight: 52.5 kg (115 lb 11.9 oz)  Body mass index is 19.87 kg/m².     Physical Exam  Vitals and nursing note reviewed.   Constitutional:       General: She is not in acute distress.     Appearance: She is well-developed. She is not diaphoretic.   HENT:      Head: Normocephalic and atraumatic.      Right Ear: External ear normal.      Left Ear: External ear normal.   Eyes:      General:         Right eye: No discharge.         Left eye: No discharge.      Conjunctiva/sclera: Conjunctivae normal.   Neck:      Thyroid: No thyromegaly.   Cardiovascular:      Rate and Rhythm: Normal rate and regular rhythm.      Heart sounds: No murmur heard.  Pulmonary:      Effort: Pulmonary effort is normal. No respiratory distress.      Breath sounds: Normal breath sounds.   Abdominal:      General: Bowel sounds are normal. There is no distension.      Palpations: Abdomen is soft. There is no mass.      Tenderness: There is no abdominal tenderness.   Musculoskeletal:         General: No deformity.      Cervical back: Normal range  of motion and neck supple.      Right lower leg: No edema.      Left lower leg: No edema.      Comments: Graft left arm with palpable thrill   Skin:     General: Skin is warm and dry.      Comments: Ulcer to middle toe of left foot, palpable dorsalis pedis pulse   Neurological:      Mental Status: She is alert and oriented to person, place, and time.      Sensory: No sensory deficit.   Psychiatric:         Mood and Affect: Mood normal.         Behavior: Behavior normal.              Significant Labs: CBC:   Recent Labs   Lab 07/19/23  1546 07/20/23  0822   WBC 6.36 8.14   HGB 6.2* 7.8*   HCT 19.8* 24.3*    182       CMP:   Recent Labs   Lab 07/19/23  1546 07/20/23  0822    143   K 4.3 4.8    106   CO2 23 23   GLU 93 87   BUN 65* 76*   CREATININE 4.8* 5.5*   CALCIUM 8.8 9.3   PROT 5.9* 6.6   ALBUMIN 2.4* 2.7*   BILITOT 0.7 1.1*   ALKPHOS 315* 397*   AST 65* 75*   ALT 40 47*   ANIONGAP 13 14       Lactic Acid:   Recent Labs   Lab 07/19/23  1546 07/19/23  1832   LACTATE 1.6 2.0         Significant Imaging:   Imaging Results               US Lower Extremity Arteries Bilateral (Final result)  Result time 07/19/23 19:46:45   Procedure changed from US Lower Extrem Arteries Bilat with NUVIA (xpd)     Final result by Jael Bowers MD (07/19/23 19:46:45)                   Narrative:    EXAMINATION:  US LOWER EXTREMITY ARTERIES BILATERAL    CLINICAL HISTORY:  foot wound;    TECHNIQUE:  Bilateral spectral, color and grayscale images of the large arteries of both lower extremities were performed.    COMPARISON:  None    FINDINGS:  Peak systolic velocities involving the right lower extremity arteries (cm/sec):    CFA: 43, triphasic    DFA: 28, biphasic    Proximal SFA: 44, triphasic    Mid SFA: 62, triphasic    Distal SFA: 62, monophasic    Proximal pop: 48, monophasic    Distal pop: 47, monophasic    NATASHA: 79, monophasic    PTA: 109, monophasic    Peroneal: 51, monophasic    DPA: 81, monophasic    Peak  systolic velocities involving the left lower extremity arteries (cm/sec):    CFA: 37, biphasic    DFA: 37, triphasic    Proximal SFA: 46, triphasic    Mid SFA: 61, triphasic    Distal SFA: 53, monophasic    Proximal pop: 47, monophasic    Distal pop: 50, monophasic    NATASHA: 86, monophasic    PTA: 27, monophasic    Peroneal: 54, monophasic    DPA: 108, monophasic    IMPRESSION  Doubling of velocity between at the right proximal SFA and the right PTA.  Findings suggest greater than 50% stenosis.    Monophasic flow from the right distal SFA through the right DPA suggesting severe peripheral vascular disease.    Doubling of velocity between the left popliteal artery and the left dorsal pedis artery.  Findings suggest greater than 50% stenosis.    Monophasic flow from the left distal SFA through the right DPA suggesting severe peripheral vascular disease.    Slower than expected velocities in bilateral common femoral and bilateral proximal superficial femoral arteries.    This report was flagged in Epic as abnormal.      Electronically signed by: Jael Bowers  Date:    07/19/2023  Time:    19:46                                     X-Ray Chest 1 View (Final result)  Result time 07/19/23 18:25:09      Final result by Gustavo Ochoa MD (07/19/23 18:25:09)                   Impression:      Increased globular enlargement of the cardiac silhouette.  Differential includes cardiomegaly, pericardial effusion or both.    Increased pulmonary vasculature.  Correlate for pulmonary venous hypertension or CHF.      Electronically signed by: Gustavo Ochoa  Date:    07/19/2023  Time:    18:25               Narrative:    EXAMINATION:  XR CHEST 1 VIEW    CLINICAL HISTORY:  Anemia, unspecified    TECHNIQUE:  Single frontal view of the chest was performed.    COMPARISON:  12/18/2022    FINDINGS:  Cardiac silhouette is increased in size with a globular configuration.  AICD mild anteriorly is again noted.  The pulmonary vasculature  has increased in size with reticular opacities bilaterally but no consolidation or discrete effusion.  Vascular stent in the axillary and brachial vessel on the left noted.  Bones intact.                                       X-Ray Foot Complete Left (Final result)  Result time 07/19/23 15:37:29      Final result by Asad Martin MD (07/19/23 15:37:29)                   Impression:      As above.      Electronically signed by: Asda Martin  Date:    07/19/2023  Time:    15:37               Narrative:    EXAMINATION:  XR FOOT COMPLETE 3 VIEW LEFT    CLINICAL HISTORY:  .  Pain, unspecified    TECHNIQUE:  AP, lateral and oblique views of the left foot were performed.    COMPARISON:  Radiographs 02/14/2023    FINDINGS:  Diffuse osteopenia.  No acute fracture or dislocation.  Joint spaces are preserved.  Achilles enthesopathy and small plantar calcaneal spur.  There is soft tissue swelling in the dorsal ankle and forefoot, which is nonspecific.  Extensive vascular calcifications.                                          Assessment/Plan:      * Toe ulcer  Presents with ulcer to left toe after mechanical injury 1 month ago. Afebrile without leukocytosis. X-ray without fracture or dislocation  Podiatry consulted  Continue rocephin/vanc  Will check ABIs  Blood cultures pending,  podiatry is consulted  and no plan for intervention until PAD is addressed by vascular surgery,US arterial is ordered.  Per GI patient has hemorrhoid,started on hydrocortisone,eliquis on hold at this time.    Hypothyroidism  Lab Results   Component Value Date    TSH 15.273 (H) 07/10/2023     Resume home synthroid has not been taking outpatient    Aortic atherosclerosis  On medical therapy.      PAF (paroxysmal atrial fibrillation)  Patient with Paroxysmal (<7 days) atrial fibrillation which is controlled currently with Beta Blocker.  Anticoagulation not indicated due to home eliquis held given anemia and Heme-positive stool in ED.    ICD  (implantable cardioverter-defibrillator) in place - SubQ  Stable denies discharge    Anemia in ESRD (end-stage renal disease)  Presents with hemoglobin of 6.2. previously 8 to 9 2 weeks ago. Denies active bleeding. Heme-positive in ED.   Type and screen and transfuse 1 unit  Will hold home eliquis as heme positive   GI consulted  Repeat CBC    Chronic combined systolic and diastolic congestive heart failure  Patient is identified as having Combined Systolic and Diastolic heart failure that is Chronic. CHF is currently controlled. Latest ECHO performed and demonstrates- Results for orders placed during the hospital encounter of 05/08/23    Echo Saline Bubble? No    Interpretation Summary  · The left ventricle is normal in size with mild eccentric hypertrophy and severely decreased systolic function.  · The estimated ejection fraction is 23%.  · There is severe left ventricular global hypokinesis.  · Grade II left ventricular diastolic dysfunction.  · Moderate left atrial enlargement.  · Normal right ventricular size with normal right ventricular systolic function.  · Moderate right atrial enlargement.  · There is mild aortic valve stenosis.  · Aortic valve area is 1.58 cm2; peak velocity is 2.01 m/s; mean gradient is 8 mmHg.  · Moderate mitral regurgitation.  · Moderate to severe tricuspid regurgitation.  · Mild pulmonic regurgitation.  · Elevated central venous pressure (15 mmHg).  · The estimated PA systolic pressure is 47 mmHg.  · There is mild-moderate pulmonary hypertension.  · Moderate posterolateral pericardial effusion. Trivial under the RA  . Continue Beta Blocker and ARNI and monitor clinical status closely. Monitor on telemetry. Patient is off CHF pathway.  Monitor strict Is&Os and daily weights.  Place on fluid restriction of 1 L. Cardiology has not been any consulted. Continue to stress to patient importance of self efficacy and  on diet for CHF. Last BNP reviewed- and noted below No results for  input(s): BNP, BNPTRIAGEBLO in the last 168 hours..    ESRD (end stage renal disease) on dialysis  Nephrology consulted for HD    Pulmonary hypertension  Fluid removal with HD.      History of kidney cancer  Followed by oncology outpatient. Home cabometyx held for last week due to diarrhea. C diff negative 7/2023 though previously positive. Continue outpatient oncology follow up    Essential hypertension  Hypotensive on arrival. Home hydralazine, held. Continue home metoprolol with hold parameters for HR and BP. will hold resuming clonidine patch      VTE Risk Mitigation (From admission, onward)         Ordered     IP VTE HIGH RISK PATIENT  Once         07/19/23 1747     Place sequential compression device  Until discontinued         07/19/23 1747     Place PING hose  Until discontinued         07/19/23 1747                Discharge Planning   MAVIS:      Code Status: Full Code   Is the patient medically ready for discharge?:     Reason for patient still in hospital (select all that apply): Patient trending condition  Discharge Plan A: Home with family                  José Miguel Cristina MD  Department of Hospital Medicine   [facility] [place] - Med Surg

## 2023-07-20 NOTE — PROGRESS NOTES
Date of Admission:7/19/2023    SUBJECTIVE:resting comfortably on hd    Current Facility-Administered Medications   Medication    0.9%  NaCl infusion (for blood administration)    0.9%  NaCl infusion    acetaminophen tablet 650 mg    cefTRIAXone (ROCEPHIN) 1 g in dextrose 5 % in water (D5W) 5 % 100 mL IVPB (MB+)    dextrose 50% injection 12.5 g    dextrose 50% injection 25 g    glucagon (human recombinant) injection 1 mg    glucose chewable tablet 16 g    glucose chewable tablet 24 g    HYDROcodone-acetaminophen 5-325 mg per tablet 1 tablet    levothyroxine tablet 75 mcg    magnesium oxide tablet 800 mg    magnesium oxide tablet 800 mg    melatonin tablet 6 mg    metoprolol succinate (TOPROL-XL) 24 hr tablet 50 mg    mupirocin 2 % ointment    naloxone 0.4 mg/mL injection 0.02 mg    senna-docusate 8.6-50 mg per tablet 1 tablet    sodium chloride 0.9% bolus 250 mL 250 mL    sodium chloride 0.9% flush 10 mL    vancomycin - pharmacy to dose     Facility-Administered Medications Ordered in Other Encounters   Medication    0.9%  NaCl infusion       Wt Readings from Last 3 Encounters:   07/20/23 52.5 kg (115 lb 11.9 oz)   07/12/23 43 kg (94 lb 12.8 oz)   07/06/23 46 kg (101 lb 6.6 oz)     Temp Readings from Last 3 Encounters:   07/20/23 97.7 °F (36.5 °C) (Oral)   07/12/23 97.9 °F (36.6 °C) (Oral)   07/06/23 98.1 °F (36.7 °C) (Oral)     BP Readings from Last 3 Encounters:   07/20/23 128/68   07/12/23 (!) 143/71   07/06/23 121/60     Pulse Readings from Last 3 Encounters:   07/20/23 94   07/12/23 72   07/06/23 109       Intake/Output Summary (Last 24 hours) at 7/20/2023 1240  Last data filed at 7/20/2023 0830  Gross per 24 hour   Intake 1731.61 ml   Output --   Net 1731.61 ml       PE:  GEN:wd female in nad  HEENT:ncat,eomi,mm  CVS:s1s2 regular  PULM:ctab  ABD:bs,soft  EXT:no leg edema, left arm avg  NEURO:awake,alert    Recent Labs   Lab 07/20/23  0822   GLU 87      K 4.8      CO2 23   BUN 76*   CREATININE 5.5*    CALCIUM 9.3   MG 2.6       Lab Results   Component Value Date    PTH >2,500.0 (H) 02/17/2023    CALCIUM 9.3 07/20/2023    PHOS 5.3 (H) 07/20/2023       Recent Labs   Lab 07/20/23  0822   WBC 8.14   RBC 2.36*   HGB 7.8*   HCT 24.3*      *   MCH 33.1*   MCHC 32.1           A/P:  1.esrd. seen on hd. Cont tx TTS.   2.malfxn avg. Needs excision. Planned. Vanc and blood cx. No heparin with hd.  3.toe ulcer. Tx per podiatry.  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 Prbc given. Following hg. Gib. Holding on epo today.  5.kidney cancer. Following.  Tx?  6.htn. sbp ok.  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 Phos ok.  8.chronic syst/dhf. Volume ok.  9.paf. rate ok. Eliquis on hold.

## 2023-07-20 NOTE — HOSPITAL COURSE
Eva Lopez Walker 57 y.o. female with afib on eliquis, HTN, CHF, ESRD on HD T/TH/S, RCC presents to the hospital chief complaint of toe wound.  She reports 1 month an ulcer to left toe that occurred after a chair rolled over foot.  He is had increasing pain to the toe described as a throbbing.  She attempted treatment at home with ice without improvement.  She is dialyzed Tuesday Thursday Saturday at [facility] in [place].  She no longer makes urine.  She denies fever chest pain shortness breast nausea vomiting abdominal pain melena hematuria hematemesis dizziness syncope.    In the ED, patient found to be afebrile without leukocytosis.  X-ray of the foot did not show any fracture or dislocation.  There was also concern for left AV graft infection.  Podiatry was consulted and there was concern for peripheral artery disease.  Ultrasounds arterial bilaterally showed bilateral 50% stenosis.  Vascular surgery consulted.  Patient was found to have a left AV graft infection and underwent excision and ligation on 7/21/23.  She had a new THDC placed by IR for new access while AVG heals. Cultures from AVG growing  Achromobacter yxlosoxidans, staph epidermidis and yeast. ID consulted for antibiotic recommendations. Underwent angiogram and succesfull revascularization of LLE by Vascular Surgery on 7/25. Okay to restart eliquis.  Podiatry discussed with patient about toe wound and plan is for wound care, antibiotics and follow-up as outpatient.    Overall, patient doing much better and stable for discharge.  She will take renally dosed Bactrim SS daily until 8/4 and fluconazole 3 times a week after dialysis on dialysis days until 8/9.  Prescription sent to pharmacy and reviewed with patient and her .  Wound care discussed with patient and her  as well and referral placed for outpatient.  Home health continued.  Patient discharged home in stable condition.

## 2023-07-20 NOTE — CONSULTS
[facility]  Vascular Surgery  Consult Note    Inpatient consult to Vascular Surgery  Consult performed by: Obi Werner MD  Consult ordered by: Jeyson Andrade PA-C  Reason for consult: toe ulcer,  PAD      Subjective:     Chief Complaint/Reason for Admission: left toe ulcer    History of Present Illness:      Eva Bloom is a 57 y.o. female end-stage renal disease, with multiple serious medical comorbidities including admission for sepsis 2 months ago, EF 20%, AFib on Eliquis, s/p multiple revisions of L arm AV graft now with long interposition Accu Seal 12/28/2022.  She has had uninterrupted use of this graft ever since.    She presents w left toe ulcer.  Rest pain with ulcer for 1 month.  Denies fever chills.     Medications Prior to Admission   Medication Sig Dispense Refill Last Dose    apixaban (ELIQUIS) 2.5 mg Tab Take 1 tablet (2.5 mg total) by mouth 2 (two) times daily. 60 tablet 11     cabozantinib (CABOMETYX) 60 mg Tab TAKE ONE TABLET BY MOUTH ONCE DAILY AT THE SAME TIME ON AN EMPTY STOMACH AT LEAST 1 HOUR BEFORE OR 2 HOURS AFTER EATING. AVOID GRAPEFRUIT PRODUCTS 30 tablet 4     cloNIDine 0.3 mg/24 hr td ptwk (CATAPRES) 0.3 mg/24 hr Place 1 patch onto the skin every 7 days. 4 patch 11     cyclobenzaprine (FLEXERIL) 5 MG tablet Take 1 tablet (5 mg total) by mouth daily as needed for Muscle spasms. 30 tablet 1     gabapentin (NEURONTIN) 100 MG capsule Take 1 capsule (100 mg total) by mouth once daily. 30 capsule 11     hydrALAZINE (APRESOLINE) 100 MG tablet Take 1 tablet (100 mg total) by mouth every 12 (twelve) hours. 180 tablet 2     HYDROcodone-acetaminophen (NORCO) 5-325 mg per tablet Take 1 tablet by mouth every 6 (six) hours as needed for Pain. 15 tablet 0     levothyroxine (SYNTHROID) 75 MCG tablet Take 1 tablet (75 mcg total) by mouth once daily. 30 tablet 11     lidocaine-prilocaine (EMLA) cream APPLY ATLEAST 30 MINUTES BEFORE TREATMENT 3 TIMES A WEEK 30 g 11      metoprolol succinate (TOPROL-XL) 100 MG 24 hr tablet Take 1/2 tablet (50mg) once daily 90 tablet 3     mv,Ca,min-folic acid-vit K1 (ONE-A-DAY WOMEN'S 50 PLUS) 400-20 mcg Tab Take 1 tablet by mouth once daily.       polyethylene glycol (GLYCOLAX) 17 gram/dose powder Take 17 g by mouth once daily. 235 g 0     promethazine (PHENERGAN) 25 MG tablet Take 1 tablet (25 mg total) by mouth every 6 (six) hours as needed for Nausea. 15 tablet 0     acetaminophen (TYLENOL) 500 MG tablet Take 500 mg by mouth every 6 (six) hours as needed for Pain.       naloxone (NARCAN) 1 mg/mL injection 2 mg (1 mg per nostril) by Nasal route as needed for opioid overdose; may repeat in 3 to 5 minutes if not effective. Call 911 2 mL 11        Review of patient's allergies indicates:   Allergen Reactions    Coreg [carvedilol] Other (See Comments)     Nausea/vomiting    Allopurinol      Other reaction(s): abnormal transaminases       Past Medical History:   Diagnosis Date    Anemia     Atrial fibrillation     Bronchitis 03/01/2017    Cancer 2016    kidney cancer    CHF (congestive heart failure), NYHA class II, chronic, systolic     CMV (cytomegalovirus) antibody positive     Encounter for blood transfusion     ESRD (end stage renal disease)     Essential hypertension 09/23/2015    H/O herpes simplex type 2 infection     Herpes simplex type 1 antibody positive     History of kidney cancer     s/p left nephrectomy 1/2016    Hyperparathyroidism, unspecified     Hyperuricemia without signs of inflammatory arthritis and tophaceous disease     Kidney stones     LGSIL (low grade squamous intraepithelial dysplasia)     Myocardiopathy 07/21/2017    Prediabetes     Proteinuria     Renal disorder     Thyroid nodule     Urate nephropathy      Past Surgical History:   Procedure Laterality Date    BREAST CYST EXCISION      COLONOSCOPY N/A 11/12/2015    COLONOSCOPY N/A 3/12/2021    Procedure: COLONOSCOPY;  Surgeon: Brendon Lanier MD;  [facility];   Service: Endoscopy;  Laterality: N/A;  covid test 3/9, labs prior, prep instr mailed -ml    INSERTION OF BIVENTRICULAR IMPLANTABLE CARDIOVERTER-DEFIBRILLATOR (ICD)  04/2021    INSERTION OF TUNNELED CENTRAL VENOUS CATHETER (CVC) WITH SUBCUTANEOUS PORT N/A 1/25/2023    Procedure: INSERTION, DUAL LUMEN CATHETER WITH PORT, WITH IMAGING GUIDANCE;  Surgeon: FARIDEH Muñoz III, MD;  [facility];  Service: Peripheral Vascular;  Laterality: N/A;  possinble permcath placment    NEPHRECTOMY-LAPAROSCOPIC Left 01/12/2016    PERCUTANEOUS TRANSLUMINAL ANGIOPLASTY OF ARTERIOVENOUS FISTULA Left 4/19/2023    Procedure: PTA, AV FISTULA;  Surgeon: FARIDEH Muñoz III, MD;  Location: [facility] CATH LAB;  Service: Peripheral Vascular;  Laterality: Left;    PERITONEAL CATHETER INSERTION      Permacath insertion  01/12/2017    PLACEMENT OF ARTERIOVENOUS GRAFT  12/28/2022    Procedure: INSERTION, GRAFT, ARTERIOVENOUS;  Surgeon: FARIDEH Muñoz III, MD;  Location: [facility] OR [facility] FLR;  Service: Peripheral Vascular;;    REMOVAL, GRAFT, ARTERIOVENOUS, UPPER EXTREMITY Left 1/25/2023    Procedure: REMOVAL, GRAFT, ARTERIOVENOUS, UPPER EXTREMITY;  Surgeon: FARIDEH Muñoz III, MD;  Location: [facility] OR 2ND FLR;  Service: Peripheral Vascular;  Laterality: Left;    REVISION OF ARTERIOVENOUS FISTULA Left 5/1/2023    Procedure: REVISION, AV FISTULA;  Surgeon: FARIDEH Muñoz III, MD;  Location: [facility] OR [facility] FLR;  Service: Peripheral Vascular;  Laterality: Left;  LUE AVG revision    REVISION OF PROCEDURE INVOLVING ARTERIOVENOUS GRAFT Left 12/28/2022    Procedure: REVISION, PROCEDURE INVOLVING ARTERIOVENOUS GRAFT;  Surgeon: FARIDEH Muñoz III, MD;  Location: [facility] OR [facility] FLR;  Service: Peripheral Vascular;  Laterality: Left;    SALPINGOOPHORECTOMY Right 2016    KJB---DAVINCI    TONSILLECTOMY      TUBAL LIGATION       Family History       Problem Relation (Age of Onset)    Cataracts Maternal Aunt    Diabetes Father, Maternal Grandfather    Heart  disease Sister    Hypertension Mother    Kidney disease Father    No Known Problems Sister, Sister, Brother, Brother, Maternal Uncle, Paternal Aunt, Paternal Uncle, Maternal Grandmother, Paternal Grandmother, Paternal Grandfather, Son, Son, Other          Tobacco Use    Smoking status: Never    Smokeless tobacco: Never   Substance and Sexual Activity    Alcohol use: No     Comment: . 2 children. works at [employer].    Drug use: Never    Sexual activity: Not on file     Review of Systems   All other systems reviewed and are negative.  Objective:     Vital Signs (Most Recent):  Temp: 98.3 °F (36.8 °C) (07/20/23 0722)  Pulse: 93 (07/20/23 0722)  Resp: 17 (07/20/23 0722)  BP: (!) 143/69 (07/20/23 0722)  SpO2: 96 % (07/20/23 0722) Vital Signs (24h Range):  Temp:  [97.9 °F (36.6 °C)-99.9 °F (37.7 °C)] 98.3 °F (36.8 °C)  Pulse:  [] 93  Resp:  [17-20] 17  SpO2:  [96 %-100 %] 96 %  BP: (108-161)/(57-81) 143/69     Weight: 52.5 kg (115 lb 11.9 oz)  Body mass index is 19.87 kg/m².        Physical Exam            Significant Labs:  CBC:   Recent Labs   Lab 07/20/23  0822   WBC 8.14   RBC 2.36*   HGB 7.8*   HCT 24.3*      *   MCH 33.1*   MCHC 32.1     CMP:   Recent Labs   Lab 07/19/23  1546   GLU 93   CALCIUM 8.8   ALBUMIN 2.4*   PROT 5.9*      K 4.3   CO2 23      BUN 65*   CREATININE 4.8*   ALKPHOS 315*   ALT 40   AST 65*   BILITOT 0.7     Coagulation: No results for input(s): LABPROT, INR, APTT in the last 48 hours.  eGFR: No results for input(s): EGFRIFAFRICA, EGFRCYSTC, LABGLOM in the last 48 hours.    Invalid input(s): EGFRNON-AA  Hemoglobin: No results for input(s): HGBA1C in the last 48 hours.  Lactic Acid:   Recent Labs   Lab 07/19/23  1832   LACTATE 2.0       Significant Diagnostics:  I have reviewed and interpreted all pertinent imaging results/findings within the past 24 hours.  Arterial duplex reviewed from yday    Assessment/Plan:     Active Diagnoses:    Diagnosis Date Noted POA     PRINCIPAL PROBLEM:  Toe ulcer [L97.509] 07/19/2023 Unknown    Hypothyroidism [E03.9] 07/19/2023 Unknown    PAF (paroxysmal atrial fibrillation) [I48.0] 07/25/2022 Yes    ICD (implantable cardioverter-defibrillator) in place - SubQ [Z95.810] 07/15/2021 Yes    Anemia in ESRD (end-stage renal disease) [N18.6, D63.1]  Yes    Chronic combined systolic and diastolic congestive heart failure [I50.42]  Yes    ESRD (end stage renal disease) on dialysis [N18.6, Z99.2]  Not Applicable    History of kidney cancer [Z85.528] 09/02/2016 Not Applicable    Essential hypertension [I10] 09/23/2015 Yes      Problems Resolved During this Admission:     57 F w hx of ESRD and L AVG, presenting with L toe ulcer , dry chronic for one month.   LUE AVG exposed, no surrounding erythema or signs of sepsis, however graft excision is recommended.  Given multiple prior access revisions in left arm, will plan ligation and new access in other arm    Consult IR for L TDC  Will plan for AVG excision tmrw after TDC placement  Vein mapping of R UE (non urgent)  Obtain NUVIA TBI to eval L toe ulcer  Will consider LE angiogram next week.  Continue IV abx  (Vanc and cefepime), pt at risk for developing sepsis   Hold DOAC (now and periop)    NPO midnight  Check CBC ,Transfuse for to keep hgb >8        Thank you for your consult. I will follow-up with patient. Please contact us if you have any additional questions.    Obi Werner MD  Vascular Surgery  [facility] [place] [facility]

## 2023-07-20 NOTE — PLAN OF CARE
Recommendations   1) Continue renal/cardiac diet   2) Add Novasource BID   3) weigh s/p HD    Goals: 1) PO intakes > 50% of meals and supplements at f/u  Nutrition Goal Status: new  Communication of RD Recs:  (POC, sticky note)

## 2023-07-20 NOTE — PLAN OF CARE
Plan for Left arm exposed AVG excision with possible ligation vs revision with new AVG placement.  Will attempt salvage access and place a new Accuseal (ready to use) AVG laterally, in which patient would not need an HD catheter placed.  However if not feasible to salvage access patient will need a left-sided TDC (RIJ occluded) placed by IR to undergo HD post graft excision.  Plan and risk and benefits discussed with patient and her .    NPO midnight  Please transfuse 1 unit for hgb goal > 8  Cont broad spectrum IV abx  Hold DOAC

## 2023-07-20 NOTE — CONSULTS
[facility] Consultation Note    Patient Complaint: toe wound    PCP:   Sowmya Mccain       LOS: 0        Initial History of Present Illness (HPI):  This is a 57 y.o. female consulted to GI service for anemia, heme-pos in ED, on eliquis. PMH afib on eliquis, HTN, CHF, ESRD on HD T/TH/S, RCC. Patient reports worsening of toe wound with associated symptoms of increasing pain and throbbing. Denies any overt bleeding of a GI source. Denies n/v, hematemesis, abdominal pain, BRBPR, black stools, diarrhea or constipation. Currently taking eliquis for afib. On oral cancer drugs for RCC.    Admit labs hgb 6.8.          Medical History:  has a past medical history of Anemia, Atrial fibrillation, Bronchitis (03/01/2017), Cancer (2016), CHF (congestive heart failure), NYHA class II, chronic, systolic, CMV (cytomegalovirus) antibody positive, Encounter for blood transfusion, ESRD (end stage renal disease), Essential hypertension (09/23/2015), H/O herpes simplex type 2 infection, Herpes simplex type 1 antibody positive, History of kidney cancer, Hyperparathyroidism, unspecified, Hyperuricemia without signs of inflammatory arthritis and tophaceous disease, Kidney stones, LGSIL (low grade squamous intraepithelial dysplasia), Myocardiopathy (07/21/2017), Prediabetes, Proteinuria, Renal disorder, Thyroid nodule, and Urate nephropathy.    Surgical History:  has a past surgical history that includes Tonsillectomy; Tubal ligation; Colonoscopy (N/A, 11/12/2015); Nephrectomy-Laparoscopic-Donor (Left, 01/12/2016); Peritoneal catheter insertion; Salpingoophorectomy (Right, 2016); Permacath insertion (01/12/2017); Breast cyst excision; Colonoscopy (N/A, 3/12/2021); Insertion of biventricular implantable cardioverter-defibrillator (ICD) (04/2021); Revision of procedure involving arteriovenous graft (Left, 12/28/2022); Placement of arteriovenous graft (12/28/2022); removal, graft, arteriovenous, upper extremity (Left,  1/25/2023); Insertion of tunneled central venous catheter (CVC) with subcutaneous port (N/A, 1/25/2023); Percutaneous transluminal angioplasty of arteriovenous fistula (Left, 4/19/2023); and Revision of arteriovenous fistula (Left, 5/1/2023).      Objective Findings:    Vital Signs:  Temp:  [97.9 °F (36.6 °C)-99.9 °F (37.7 °C)]   Pulse:  []   Resp:  [17-20]   BP: (108-161)/(57-81)   SpO2:  [96 %-100 %]   Body mass index is 19.87 kg/m².      Physical Exam  Vitals and nursing note reviewed.   Constitutional:       Appearance: Normal appearance.   HENT:      Head: Normocephalic.   Pulmonary:      Effort: Pulmonary effort is normal.   Abdominal:      General: Bowel sounds are normal.      Palpations: Abdomen is soft.   Skin:     General: Skin is warm and dry.   Neurological:      Mental Status: She is alert and oriented to person, place, and time.   Psychiatric:         Mood and Affect: Mood normal.         Behavior: Behavior normal.         Thought Content: Thought content normal.         Judgment: Judgment normal.             Labs:  Lab Results   Component Value Date    WBC 8.14 07/20/2023    HGB 7.8 (L) 07/20/2023    HCT 24.3 (L) 07/20/2023     07/20/2023    CHOL 101 (L) 10/22/2022    TRIG 97 10/22/2022    HDL 35 (L) 10/22/2022    ALT 47 (H) 07/20/2023    AST 75 (H) 07/20/2023     07/20/2023    K 4.8 07/20/2023     07/20/2023    CREATININE 5.5 (H) 07/20/2023    BUN 76 (H) 07/20/2023    CO2 23 07/20/2023    TSH 15.273 (H) 07/10/2023    INR 1.1 11/16/2022    HGBA1C 4.8 10/22/2022           Endoscopy: 2021 colonoscopy- One 3 mm polyp in the sigmoid colon, removed with                        a jumbo cold forceps. Resected and retrieved.                        - Non-bleeding external and internal hemorrhoids.                        - The examination was otherwise normal on direct and                        retroflexion views.     I have independently reviewed and interpreted the imaging  above           Anemia of chronic disease. Current anticoagulant use. Diabetic wound. ESRD on hemodialysis. RCC. Hx of internal and external hemorrhoids.    Plan/ Recommendations:  1.  Continue to monitor counts. Hgb increased appropriately with transfusion of 1 unit. Denies overt bleeding. Heme pos in ED not abnormal finding with anticoagulation and hemorrhoids in clinical picture. Anemia multifactorial with anticoag. ESRD, hemorrhoids and cancer drugs. No plan for acute endoscopic procedure at this time. Ok to resume diet from GI standpoint.    2. Podiatry following, Nephrology consulted per .        Thank you so much for allowing us to participate in the care of Eva Bloom . Please contact us if you have any additional questions.    Diana Christian NP  Gastroenterology  [facility] [place] - Med Surg

## 2023-07-20 NOTE — ASSESSMENT & PLAN NOTE
Presents with ulcer to left toe after mechanical injury 1 month ago. Afebrile without leukocytosis. X-ray without fracture or dislocation  Podiatry consulted  Continue rocephin/vanc  Will check ABIs  Blood cultures pending,  podiatry is consulted  and no plan for intervention until PAD is addressed by vascular surgery,US arterial is ordered.  Per GI patient has hemorrhoid,started on hydrocortisone,eliquis on hold at this time.

## 2023-07-20 NOTE — PROGRESS NOTES
Pharmacokinetic Assessment Follow Up: IV Vancomycin    Vancomycin serum concentration assessment(s):    The random level was drawn correctly and can be used to guide therapy at this time. The measurement is within the desired definitive target range of 10 to 20 mcg/mL.    Vancomycin Regimen Plan:    Dose 500 mg today after dialysis    Re-dose when the random level is less than 20 mcg/mL, next level to be drawn at 0600 on 7/22    Drug levels (last 3 results):  Recent Labs   Lab Result Units 07/20/23  0822   Vancomycin, Random ug/mL 17.7  17.2       Pharmacy will continue to follow and monitor vancomycin.    Please contact pharmacy at extension [phone] for questions regarding this assessment.    Thank you for the consult,   Gentry Ross       Patient brief summary:  Eva Bloom is a 57 y.o. female initiated on antimicrobial therapy with IV Vancomycin for treatment of skin & soft tissue infection      Drug Allergies:   Review of patient's allergies indicates:   Allergen Reactions    Coreg [carvedilol] Other (See Comments)     Nausea/vomiting    Allopurinol      Other reaction(s): abnormal transaminases       Actual Body Weight:   52.5 kg    Renal Function:   Estimated Creatinine Clearance: 9.2 mL/min (A) (based on SCr of 5.5 mg/dL (H)).,     Dialysis Method (if applicable):  intermittent HD    CBC (last 72 hours):  Recent Labs   Lab Result Units 07/19/23  1546 07/20/23  0822   WBC K/uL 6.36 8.14   Hemoglobin g/dL 6.2* 7.8*   Hematocrit % 19.8* 24.3*   Platelets K/uL 177 182   Gran % % 59.4 62.3   Lymph % % 20.6 21.3   Mono % % 15.9* 12.0   Eosinophil % % 2.7 3.3   Basophil % % 0.6 0.6   Differential Method  Automated Automated       Metabolic Panel (last 72 hours):  Recent Labs   Lab Result Units 07/19/23  1546 07/20/23  0822   Sodium mmol/L 143 143   Potassium mmol/L 4.3 4.8   Chloride mmol/L 107 106   CO2 mmol/L 23 23   Glucose mg/dL 93 87   BUN mg/dL 65* 76*   Creatinine mg/dL 4.8* 5.5*   Albumin g/dL 2.4*  2.7*   Total Bilirubin mg/dL 0.7 1.1*   Alkaline Phosphatase U/L 315* 397*   AST U/L 65* 75*   ALT U/L 40 47*   Magnesium mg/dL  --  2.6   Phosphorus mg/dL  --  5.3*       Vancomycin Administrations:  vancomycin given in the last 96 hours                     vancomycin (VANCOCIN) 1,000 mg in dextrose 5 % (D5W) 250 mL IVPB (Vial-Mate) (mg) 1,000 mg New Bag 07/19/23 1619                    Microbiologic Results:  Microbiology Results (last 7 days)       Procedure Component Value Units Date/Time    Aerobic culture (Specify Source) **CANNOT BE ORDERED AS STAT** [[id number]] Collected: 07/20/23 0528    Order Status: Sent Specimen: Wound from Toe, Left Foot Updated: 07/20/23 0611    Blood culture x two cultures. Draw prior to antibiotics. [[id number]] Collected: 07/19/23 1602    Order Status: Completed Specimen: Blood from Peripheral, Forearm, Right Updated: 07/19/23 2312     Blood Culture, Routine No Growth to date    Narrative:      Aerobic and anaerobic    Blood culture x two cultures. Draw prior to antibiotics. [[id number]] Collected: 07/19/23 1547    Order Status: Completed Specimen: Blood from Peripheral, Forearm, Right Updated: 07/19/23 2312     Blood Culture, Routine No Growth to date    Narrative:      Aerobic and anaerobic

## 2023-07-20 NOTE — PROGRESS NOTES
"[facility] - [place] - Med Surg  Adult Nutrition  Progress Note    SUMMARY       Recommendations   1) Continue renal/cardiac diet   2) Add Novasource BID   3) weigh s/p HD    Goals: 1) PO intakes > 50% of meals and supplements at f/u  Nutrition Goal Status: new  Communication of RD Recs:  (POC, sticky note)    Assessment and Plan    Inadequate energy intake  R/t decreased appetite and increased needs d/t CA and ESRD   As evidenced by PO Intakes < 50% of needs x at least 1 day  Intervention: Na/Phos/K modified diet and nutrition supplement therapy  new    Malnutrition Assessment     Skin (Micronutrient):  (toe ulcer)                                 Reason for Assessment    Reason For Assessment: identified at risk by screening criteria  Diagnosis:  (toe ulcer)  Relevant Medical History: kidney CA, ESRD on HD, anemia of chronic disease, AFIB, HTN, CHF, toe ulcer, pre-DM, kidney stones, hypothyroidism  Interdisciplinary Rounds: did not attend (remote)    General Information Comments: 58 y/o female admitted with toe ulcer. Getting HD. renal/cardiac diet ordered, PO intakes are poor. NFPE to be done by on-site RD at f/u. Wt gain per chart review ( UBW 43-47 kg).    Nutrition Discharge Planning: To be determined- Renal diet, low sodium + Novasource BID    Nutrition Risk Screen    Nutrition Risk Screen: large or nonhealing wound, burn or pressure injury    Nutrition/Diet History    Spiritual, Cultural Beliefs, Mormonism Practices, Values that Affect Care: no  Food Allergies: NKFA  Factors Affecting Nutritional Intake: decreased appetite    Anthropometrics    Temp: 97.8 °F (36.6 °C)  Height Method: Measured  Height: 5' 4" (162.6 cm)  Height (inches): 64 in  Weight Method: Bed Scale  Weight: 52.5 kg (115 lb 11.9 oz)  Weight (lb): 115.74 lb  Ideal Body Weight (IBW), Female: 120 lb  % Ideal Body Weight, Female (lb): 96.45 %  BMI (Calculated): 19.9  BMI Grade: 18.5-24.9 - normal       Lab/Procedures/Meds    Pertinent Labs Reviewed: " reviewed  BMP  Lab Results   Component Value Date     07/20/2023    K 4.8 07/20/2023     07/20/2023    CO2 23 07/20/2023    BUN 76 (H) 07/20/2023    CREATININE 5.5 (H) 07/20/2023    CALCIUM 9.3 07/20/2023    ANIONGAP 14 07/20/2023    EGFRNORACEVR 8 (A) 07/20/2023     Lab Results   Component Value Date    CALCIUM 9.3 07/20/2023    PHOS 5.3 (H) 07/20/2023     Lab Results   Component Value Date    IRON 94 07/19/2023    TRANSFERRIN 153 (L) 07/19/2023    TIBC 226 (L) 07/19/2023    FESATURATED 42 07/19/2023      No results for input(s): POCTGLUCOSE in the last 24 hours.  Lab Results   Component Value Date    HGBA1C 4.8 10/22/2022       Pertinent Medications Reviewed: reviewed  Pertinent Medications Comments: senna, Mg oxide    Estimated/Assessed Needs    Weight Used For Calorie Calculations: 52.5 kg (115 lb 11.9 oz)  Energy Calorie Requirements (kcal): 30-35 kcal/kg ( HD)= 1706-1547 kcal  Energy Need Method: Kcal/kg  Protein Requirements: 1.2-1.5 g protein/kg ( wound/HD) = 63-78 g  Weight Used For Protein Calculations: 52.5 kg (115 lb 11.9 oz)  Fluid Requirements (mL): 1500 ml or per MD ( HD)  Estimated Fluid Requirement Method: other (see comments)  CHO Requirement: 177-216 g      Nutrition Prescription Ordered    Current Diet Order: renal/cardiac diet    Evaluation of Received Nutrient/Fluid Intake    Energy Calories Required: not meeting needs  Protein Required: not meeting needs  Fluid Required: meeting needs  Tolerance: tolerating     Intake/Output Summary (Last 24 hours) at 7/20/2023 1453  Last data filed at 7/20/2023 1400  Gross per 24 hour   Intake 1731.61 ml   Output 2000 ml   Net -268.39 ml       % Intake of Estimated Energy Needs: 0-25%  % Meal Intake: 0-25%    Nutrition Risk    Level of Risk/Frequency of Follow-up:  (2 x weekly)     Monitor and Evaluation    Food and Nutrient Intake: energy intake, food and beverage intake  Food and Nutrient Adminstration: diet order  Knowledge/Beliefs/Attitudes:  food and nutrition knowledge/skill  Physical Activity and Function: nutrition-related ADLs and IADLs  Anthropometric Measurements: weight  Biochemical Data, Medical Tests and Procedures: electrolyte and renal panel, glucose/endocrine profile  Nutrition-Focused Physical Findings: overall appearance, skin, extremities, muscles and bones     Nutrition Follow-Up    RD Follow-up?: Yes

## 2023-07-20 NOTE — PLAN OF CARE
Case Management Assessment     PCP: Sowmya Mccain  Pharm[facility]: [facility] on [address] and [address]    Patient Arrived From: home  Existing Help at Home: spouse    Barriers to Discharge: none     Discharge Plan:    A. Home with family   B. Home with family      SW completed brief assessment and discussed discharge planning with patient and her  at her bedside. Patient receives  dialysis at [facility] in [place] on Tues, Thurs and Sat. Patient also receives  services through [facility] and would like to resume upon discharge. Patient's  will provide transportation for him to get home when discharge from the hospital.     07/19/23 4868   Discharge Planning   Assessment Type Discharge Planning Brief Assessment   Resource/Environmental Concerns none   Support Systems Spouse/significant other   Equipment Currently Used at Home raised toilet;wheelchair;bedside commode   Current Living Arrangements home   Patient/Family Anticipates Transition to home with family   Patient/Family Anticipated Services at Transition none   DME Needed Upon Discharge  none   Discharge Plan A Home with family   Discharge Plan B Home with family

## 2023-07-20 NOTE — CONSULTS
[facility] Surg  Podiatry  Consult Note    Patient Name: Eva Bloom  MRN: [id number]  Admission Date: 7/19/2023  Hospital Length of Stay: 0 days  Attending Physician: José Miguel Cristina MD  Primary Care Provider: Sowmya Mccain MD     Inpatient consult to Podiatry  Consult performed by: Ginger Mandel DPM  Consult ordered by: Leeroy Nix MD  Reason for consult: Ulcer toes  Assessment/Recommendations:   Imaging reviewed to rule out bone involvement or gas in the soft tissues. No gas nor bone involvement noted, however the ability to palpate bone beneath eschar raises clinical suspicion of potential early OM    Arterial studies and clinical exam consistent with PAOD, vascular surgery on board.  Will defer in podiatric surgical intervention until any vascular intervention     Nursing orders for betadine paint qshift          Subjective:     History of Present Illness: Eva Bloom is a 57 y.o. female with  has a past medical history of Anemia, Atrial fibrillation, Bronchitis, Cancer, CHF (congestive heart failure), NYHA class II, chronic, systolic, CMV (cytomegalovirus) antibody positive, Encounter for blood transfusion, ESRD (end stage renal disease), Essential hypertension, H/O herpes simplex type 2 infection, Herpes simplex type 1 antibody positive, History of kidney cancer, Hyperparathyroidism, unspecified, Hyperuricemia without signs of inflammatory arthritis and tophaceous disease, Kidney stones, LGSIL (low grade squamous intraepithelial dysplasia), Myocardiopathy, Prediabetes, Proteinuria, Renal disorder, Thyroid nodule, and Urate nephropathy.  Consult to Podiatry for evaluation and treatment of left toe wound.  Patient relates that while barefoot a chair rolled over her left foot several weeks ago.  She attempted self-care of the subsequent injury with icing and topical triple antibiotic ointment.  She states that despite home care, the wound began to change in color and increase in pain  with palpation and ambulation.  She denies seeing purulence and has not seen blood in several days.     Chief Complaint   Patient presents with    Toe Injury     Pt reports a chair rolled over her left foot x2 weeks ago., Wound on top of her 3rd toe will not heal  Pt sent by home health nurse to have the wound check out.  Pt denies fever or chills.  Pt is a dialysis pt.       Scheduled Meds:   cefTRIAXone (ROCEPHIN) IVPB  1 g Intravenous Q24H    levothyroxine  75 mcg Oral Daily    metoprolol succinate  50 mg Oral Daily    mupirocin   Nasal BID     Continuous Infusions:  PRN Meds:sodium chloride, sodium chloride 0.9%, acetaminophen, dextrose 50%, dextrose 50%, glucagon (human recombinant), glucose, glucose, HYDROcodone-acetaminophen, magnesium oxide, magnesium oxide, melatonin, naloxone, senna-docusate 8.6-50 mg, sodium chloride 0.9%, sodium chloride 0.9%, Pharmacy to dose Vancomycin consult **AND** vancomycin - pharmacy to dose    Review of patient's allergies indicates:   Allergen Reactions    Coreg [carvedilol] Other (See Comments)     Nausea/vomiting    Allopurinol      Other reaction(s): abnormal transaminases        Past Medical History:   Diagnosis Date    Anemia     Atrial fibrillation     Bronchitis 03/01/2017    Cancer 2016    kidney cancer    CHF (congestive heart failure), NYHA class II, chronic, systolic     CMV (cytomegalovirus) antibody positive     Encounter for blood transfusion     ESRD (end stage renal disease)     Essential hypertension 09/23/2015    H/O herpes simplex type 2 infection     Herpes simplex type 1 antibody positive     History of kidney cancer     s/p left nephrectomy 1/2016    Hyperparathyroidism, unspecified     Hyperuricemia without signs of inflammatory arthritis and tophaceous disease     Kidney stones     LGSIL (low grade squamous intraepithelial dysplasia)     Myocardiopathy 07/21/2017    Prediabetes     Proteinuria     Renal disorder     Thyroid nodule     Urate nephropathy       Past Surgical History:   Procedure Laterality Date    BREAST CYST EXCISION      COLONOSCOPY N/A 11/12/2015    COLONOSCOPY N/A 3/12/2021    Procedure: COLONOSCOPY;  Surgeon: Brendon Lanier MD;  Location: [facility] ENDO;  Service: Endoscopy;  Laterality: N/A;  covid test 3/9, labs prior, prep instr mailed -ml    INSERTION OF BIVENTRICULAR IMPLANTABLE CARDIOVERTER-DEFIBRILLATOR (ICD)  04/2021    INSERTION OF TUNNELED CENTRAL VENOUS CATHETER (CVC) WITH SUBCUTANEOUS PORT N/A 1/25/2023    Procedure: INSERTION, DUAL LUMEN CATHETER WITH PORT, WITH IMAGING GUIDANCE;  Surgeon: FARIDEH Muñoz III, MD;  [facility];  Service: Peripheral Vascular;  Laterality: N/A;  possinble permcath placment    NEPHRECTOMY-LAPAROSCOPIC Left 01/12/2016    PERCUTANEOUS TRANSLUMINAL ANGIOPLASTY OF ARTERIOVENOUS FISTULA Left 4/19/2023    Procedure: PTA, AV FISTULA;  Surgeon: FARIDEH Muñoz III, MD;  Location: [facility] CATH LAB;  Service: Peripheral Vascular;  Laterality: Left;    PERITONEAL CATHETER INSERTION      Permacath insertion  01/12/2017    PLACEMENT OF ARTERIOVENOUS GRAFT  12/28/2022    Procedure: INSERTION, GRAFT, ARTERIOVENOUS;  Surgeon: FARIDEH Muñoz III, MD;  [facility];  Service: Peripheral Vascular;;    REMOVAL, GRAFT, ARTERIOVENOUS, UPPER EXTREMITY Left 1/25/2023    Procedure: REMOVAL, GRAFT, ARTERIOVENOUS, UPPER EXTREMITY;  Surgeon: FARIDEH Muñoz III, MD;  Location: [facility] OR [facility] FLR;  Service: Peripheral Vascular;  Laterality: Left;    REVISION OF ARTERIOVENOUS FISTULA Left 5/1/2023    Procedure: REVISION, AV FISTULA;  Surgeon: FARIDEH Muñoz III, MD;  Location: [facility] OR [facility] FLR;  Service: Peripheral Vascular;  Laterality: Left;  LUE AVG revision    REVISION OF PROCEDURE INVOLVING ARTERIOVENOUS GRAFT Left 12/28/2022    Procedure: REVISION, PROCEDURE INVOLVING ARTERIOVENOUS GRAFT;  Surgeon: FARIDEH Muñoz III, MD;  [facility];  Service: Peripheral Vascular;  Laterality: Left;     SALPINGOOPHORECTOMY Right 2016    KJB---DAVINCI    TONSILLECTOMY      TUBAL LIGATION         Family History       Problem Relation (Age of Onset)    Cataracts Maternal Aunt    Diabetes Father, Maternal Grandfather    Heart disease Sister    Hypertension Mother    Kidney disease Father    No Known Problems Sister, Sister, Brother, Brother, Maternal Uncle, Paternal Aunt, Paternal Uncle, Maternal Grandmother, Paternal Grandmother, Paternal Grandfather, Son, Son, Other          Tobacco Use    Smoking status: Never    Smokeless tobacco: Never   Substance and Sexual Activity    Alcohol use: No     Comment: . 2 children. works at [employer].    Drug use: Never    Sexual activity: Not on file     Review of Systems   Constitutional:  Positive for activity change and fatigue. Negative for appetite change, chills and fever.   Respiratory:  Negative for cough and shortness of breath.    Cardiovascular:  Positive for leg swelling. Negative for chest pain.   Gastrointestinal:  Negative for diarrhea, nausea and vomiting.   Musculoskeletal:  Positive for arthralgias.   Skin:  Positive for color change and wound.   Neurological:  Negative for weakness.        + paresthesia    Objective:     Vital Signs (Most Recent):  Temp: 98.3 °F (36.8 °C) (07/20/23 0722)  Pulse: 93 (07/20/23 0722)  Resp: 17 (07/20/23 0722)  BP: (!) 143/69 (07/20/23 0722)  SpO2: 96 % (07/20/23 0722) Vital Signs (24h Range):  Temp:  [97.9 °F (36.6 °C)-99.9 °F (37.7 °C)] 98.3 °F (36.8 °C)  Pulse:  [] 93  Resp:  [17-20] 17  SpO2:  [96 %-100 %] 96 %  BP: (108-161)/(57-81) 143/69     Weight: 52.5 kg (115 lb 11.9 oz)  Body mass index is 19.87 kg/m².    General: Pt. is thin, appears stated age, in no acute distress, alert and oriented x 3. Calm, cooperative, and communicative. Appropriate interactions and affect.    Lower Extremity Examination    Vascular: Dorsalis pedis and posterior tibial pulses are diminished Bilaterally. Toes are cool to touch. Feet are  warm proximally.There is decreased digital hair. Skin is atrophic, edematous to forefoot danny    Neurologic: Light touch present. proprioception intact    Musculoskeletal: Muscle strength is 5/5 in all groups bilaterally.    Lymphatics: no lymphangitic streaking bilaterally.    Dermatologic: Skin is atrophic    Ulcer location: left dorsomedial 3rd digit PIPJ  Signs of infection: local edema and erythema  Drainage: Ulcer dry  Purulence: No  Crepitus/fluctuance: No  Periwound: Reddened  Base: Necrotic eschar  Depth:  unstageable  Probe to bone: can palpates bone and joint beneath eschar            Laboratory:  A1C: No results for input(s): HGBA1C in the last 4320 hours.  CBC:   Recent Labs   Lab 07/20/23 0822   WBC 8.14   RBC 2.36*   HGB 7.8*   HCT 24.3*      *   MCH 33.1*   MCHC 32.1     CMP:   Recent Labs   Lab 07/20/23 0822   GLU 87   CALCIUM 9.3   ALBUMIN 2.7*   PROT 6.6      K 4.8   CO2 23      BUN 76*   CREATININE 5.5*   ALKPHOS 397*   ALT 47*   AST 75*   BILITOT 1.1*     CRP: No results for input(s): CRP in the last 168 hours.  ESR: No results for input(s): SEDRATE in the last 168 hours.  Microbiology Results (last 7 days)       Procedure Component Value Units Date/Time    Aerobic culture (Specify Source) **CANNOT BE ORDERED AS STAT** [[id number]] Collected: 07/20/23 0528    Order Status: Sent Specimen: Wound from Toe, Left Foot Updated: 07/20/23 0611    Blood culture x two cultures. Draw prior to antibiotics. [[id number]] Collected: 07/19/23 1602    Order Status: Completed Specimen: Blood from Peripheral, Forearm, Right Updated: 07/19/23 2312     Blood Culture, Routine No Growth to date    Narrative:      Aerobic and anaerobic    Blood culture x two cultures. Draw prior to antibiotics. [[id number]] Collected: 07/19/23 1547    Order Status: Completed Specimen: Blood from Peripheral, Forearm, Right Updated: 07/19/23 2312     Blood Culture, Routine No Growth to date    Narrative:       Aerobic and anaerobic            Diagnostic Results:  Imaging Results               US Lower Extremity Arteries Bilateral (Final result)  Result time 07/19/23 19:46:45   Procedure changed from US Lower Extrem Arteries Bilat with NUVIA (xpd)     Final result by Jael Bowers MD (07/19/23 19:46:45)                   Narrative:    EXAMINATION:  US LOWER EXTREMITY ARTERIES BILATERAL    CLINICAL HISTORY:  foot wound;    TECHNIQUE:  Bilateral spectral, color and grayscale images of the large arteries of both lower extremities were performed.    COMPARISON:  None    FINDINGS:  Peak systolic velocities involving the right lower extremity arteries (cm/sec):    CFA: 43, triphasic    DFA: 28, biphasic    Proximal SFA: 44, triphasic    Mid SFA: 62, triphasic    Distal SFA: 62, monophasic    Proximal pop: 48, monophasic    Distal pop: 47, monophasic    NATASHA: 79, monophasic    PTA: 109, monophasic    Peroneal: 51, monophasic    DPA: 81, monophasic    Peak systolic velocities involving the left lower extremity arteries (cm/sec):    CFA: 37, biphasic    DFA: 37, triphasic    Proximal SFA: 46, triphasic    Mid SFA: 61, triphasic    Distal SFA: 53, monophasic    Proximal pop: 47, monophasic    Distal pop: 50, monophasic    NATASHA: 86, monophasic    PTA: 27, monophasic    Peroneal: 54, monophasic    DPA: 108, monophasic    IMPRESSION  Doubling of velocity between at the right proximal SFA and the right PTA.  Findings suggest greater than 50% stenosis.    Monophasic flow from the right distal SFA through the right DPA suggesting severe peripheral vascular disease.    Doubling of velocity between the left popliteal artery and the left dorsal pedis artery.  Findings suggest greater than 50% stenosis.    Monophasic flow from the left distal SFA through the right DPA suggesting severe peripheral vascular disease.    Slower than expected velocities in bilateral common femoral and bilateral proximal superficial femoral arteries.    This  report was flagged in Epic as abnormal.      Electronically signed by: Jael Bowers  Date:    07/19/2023  Time:    19:46                                     X-Ray Chest 1 View (Final result)  Result time 07/19/23 18:25:09      Final result by Gustavo Ochoa MD (07/19/23 18:25:09)                   Impression:      Increased globular enlargement of the cardiac silhouette.  Differential includes cardiomegaly, pericardial effusion or both.    Increased pulmonary vasculature.  Correlate for pulmonary venous hypertension or CHF.      Electronically signed by: Gustavo Ochoa  Date:    07/19/2023  Time:    18:25               Narrative:    EXAMINATION:  XR CHEST 1 VIEW    CLINICAL HISTORY:  Anemia, unspecified    TECHNIQUE:  Single frontal view of the chest was performed.    COMPARISON:  12/18/2022    FINDINGS:  Cardiac silhouette is increased in size with a globular configuration.  AICD mild anteriorly is again noted.  The pulmonary vasculature has increased in size with reticular opacities bilaterally but no consolidation or discrete effusion.  Vascular stent in the axillary and brachial vessel on the left noted.  Bones intact.                                       X-Ray Foot Complete Left (Final result)  Result time 07/19/23 15:37:29      Final result by Asad Martin MD (07/19/23 15:37:29)                   Impression:      As above.      Electronically signed by: Asad Martin  Date:    07/19/2023  Time:    15:37               Narrative:    EXAMINATION:  XR FOOT COMPLETE 3 VIEW LEFT    CLINICAL HISTORY:  .  Pain, unspecified    TECHNIQUE:  AP, lateral and oblique views of the left foot were performed.    COMPARISON:  Radiographs 02/14/2023    FINDINGS:  Diffuse osteopenia.  No acute fracture or dislocation.  Joint spaces are preserved.  Achilles enthesopathy and small plantar calcaneal spur.  There is soft tissue swelling in the dorsal ankle and forefoot, which is nonspecific.  Extensive vascular  calcifications.                                     Assessment/Plan:     Active Diagnoses:    Diagnosis Date Noted POA    PRINCIPAL PROBLEM:  Toe ulcer [L97.509] 07/19/2023 Unknown    Hypothyroidism [E03.9] 07/19/2023 Unknown    PAF (paroxysmal atrial fibrillation) [I48.0] 07/25/2022 Yes    ICD (implantable cardioverter-defibrillator) in place - SubQ [Z95.810] 07/15/2021 Yes    Anemia in ESRD (end-stage renal disease) [N18.6, D63.1]  Yes    Chronic combined systolic and diastolic congestive heart failure [I50.42]  Yes    ESRD (end stage renal disease) on dialysis [N18.6, Z99.2]  Not Applicable    History of kidney cancer [Z85.528] 09/02/2016 Not Applicable    Essential hypertension [I10] 09/23/2015 Yes      Problems Resolved During this Admission:     Education about the prevention of limb loss.    Discussed wound healing cycle, skin integrity, ways to care for skin.Counseled patient on the effects of PAOD on healing. She verbalizes understanding that it can increase the chances of delayed healing and this prolonged exposure leads to infection or progression of infection which subsequently can result in loss of limb.    Imaging reviewed to rule out bone involvement or gas in the soft tissues. No gas nor bone involvement noted, however the ability to palpate bone beneath eschar raises clinical suspicion of potential early OM    Arterial studies and clinical exam consistent with PAOD, vascular surgery on board.  Will defer in podiatric surgical intervention until any vascular intervention     Nursing orders for betadine paint qshift      Thank you for your consult. I will follow-up with patient. Please contact us if you have any additional questions.    Ginger Mandel DPM  Podiatry  [facility] [place] - Med Surg

## 2023-07-20 NOTE — PLAN OF CARE
Problem: Anemia  Goal: Anemia Symptom Improvement  Outcome: Ongoing, Progressing  Intervention: Monitor and Manage Anemia  Flowsheets (Taken 7/20/2023 0422)  Safety Promotion/Fall Prevention:   assistive device/personal item within reach   bed alarm set   lighting adjusted   nonskid shoes/socks when out of bed   medications reviewed   side rails raised x 2

## 2023-07-20 NOTE — SUBJECTIVE & OBJECTIVE
Past Medical History:   Diagnosis Date    Anemia     Atrial fibrillation     Bronchitis 03/01/2017    Cancer 2016    kidney cancer    CHF (congestive heart failure), NYHA class II, chronic, systolic     CMV (cytomegalovirus) antibody positive     Encounter for blood transfusion     ESRD (end stage renal disease)     Essential hypertension 09/23/2015    H/O herpes simplex type 2 infection     Herpes simplex type 1 antibody positive     History of kidney cancer     s/p left nephrectomy 1/2016    Hyperparathyroidism, unspecified     Hyperuricemia without signs of inflammatory arthritis and tophaceous disease     Kidney stones     LGSIL (low grade squamous intraepithelial dysplasia)     Myocardiopathy 07/21/2017    Prediabetes     Proteinuria     Renal disorder     Thyroid nodule     Urate nephropathy        Past Surgical History:   Procedure Laterality Date    BREAST CYST EXCISION      COLONOSCOPY N/A 11/12/2015    COLONOSCOPY N/A 3/12/2021    Procedure: COLONOSCOPY;  Surgeon: Brendon Lanier MD;  Location: [facility] ENDO;  Service: Endoscopy;  Laterality: N/A;  covid test 3/9, labs prior, prep instr mailed -ml    INSERTION OF BIVENTRICULAR IMPLANTABLE CARDIOVERTER-DEFIBRILLATOR (ICD)  04/2021    INSERTION OF TUNNELED CENTRAL VENOUS CATHETER (CVC) WITH SUBCUTANEOUS PORT N/A 1/25/2023    Procedure: INSERTION, DUAL LUMEN CATHETER WITH PORT, WITH IMAGING GUIDANCE;  Surgeon: FARIDEH Muñoz III, MD;  Location: [facility] OR [address] [place] [address];  Service: Peripheral Vascular;  Laterality: N/A;  possinble permcath placment    NEPHRECTOMY-LAPAROSCOPIC Left 01/12/2016    PERCUTANEOUS TRANSLUMINAL ANGIOPLASTY OF ARTERIOVENOUS FISTULA Left 4/19/2023    Procedure: PTA, AV FISTULA;  Surgeon: FARIDEH Muñoz III, MD;  Location: [facility] CATH LAB;  Service: Peripheral Vascular;  Laterality: Left;    PERITONEAL CATHETER INSERTION      Permacath insertion  01/12/2017    PLACEMENT OF ARTERIOVENOUS GRAFT  12/28/2022    Procedure: INSERTION, GRAFT,  ARTERIOVENOUS;  Surgeon: FARIDEH Muñoz III, MD;  Location: [facility] OR 2ND FLR;  Service: Peripheral Vascular;;    REMOVAL, GRAFT, ARTERIOVENOUS, UPPER EXTREMITY Left 1/25/2023    Procedure: REMOVAL, GRAFT, ARTERIOVENOUS, UPPER EXTREMITY;  Surgeon: FARIDEH Muñoz III, MD;  Location: [facility] OR 2ND FLR;  Service: Peripheral Vascular;  Laterality: Left;    REVISION OF ARTERIOVENOUS FISTULA Left 5/1/2023    Procedure: REVISION, AV FISTULA;  Surgeon: FARIDEH Muñoz III, MD;  Location: [facility] OR 2ND FLR;  Service: Peripheral Vascular;  Laterality: Left;  LUE AVG revision    REVISION OF PROCEDURE INVOLVING ARTERIOVENOUS GRAFT Left 12/28/2022    Procedure: REVISION, PROCEDURE INVOLVING ARTERIOVENOUS GRAFT;  Surgeon: FARIDEH Muñoz III, MD;  Location: [facility] OR 2ND FLR;  Service: Peripheral Vascular;  Laterality: Left;    SALPINGOOPHORECTOMY Right 2016    KJB---DAVINCI    TONSILLECTOMY      TUBAL LIGATION         Review of patient's allergies indicates:   Allergen Reactions    Coreg [carvedilol] Other (See Comments)     Nausea/vomiting    Allopurinol      Other reaction(s): abnormal transaminases       Current Facility-Administered Medications on File Prior to Encounter   Medication    0.9%  NaCl infusion     Current Outpatient Medications on File Prior to Encounter   Medication Sig    apixaban (ELIQUIS) 2.5 mg Tab Take 1 tablet (2.5 mg total) by mouth 2 (two) times daily.    cabozantinib (CABOMETYX) 60 mg Tab TAKE ONE TABLET BY MOUTH ONCE DAILY AT THE SAME TIME ON AN EMPTY STOMACH AT LEAST 1 HOUR BEFORE OR 2 HOURS AFTER EATING. AVOID GRAPEFRUIT PRODUCTS    cloNIDine 0.3 mg/24 hr td ptwk (CATAPRES) 0.3 mg/24 hr Place 1 patch onto the skin every 7 days.    cyclobenzaprine (FLEXERIL) 5 MG tablet Take 1 tablet (5 mg total) by mouth daily as needed for Muscle spasms.    gabapentin (NEURONTIN) 100 MG capsule Take 1 capsule (100 mg total) by mouth once daily.    hydrALAZINE (APRESOLINE) 100 MG tablet Take 1 tablet (100 mg total) by  mouth every 12 (twelve) hours.    HYDROcodone-acetaminophen (NORCO) 5-325 mg per tablet Take 1 tablet by mouth every 6 (six) hours as needed for Pain.    levothyroxine (SYNTHROID) 75 MCG tablet Take 1 tablet (75 mcg total) by mouth once daily.    lidocaine-prilocaine (EMLA) cream APPLY ATLEAST 30 MINUTES BEFORE TREATMENT 3 TIMES A WEEK    metoprolol succinate (TOPROL-XL) 100 MG 24 hr tablet Take 1/2 tablet (50mg) once daily    mv,Ca,min-folic acid-vit K1 (ONE-A-DAY WOMEN'S 50 PLUS) 400-20 mcg Tab Take 1 tablet by mouth once daily.    polyethylene glycol (GLYCOLAX) 17 gram/dose powder Take 17 g by mouth once daily.    promethazine (PHENERGAN) 25 MG tablet Take 1 tablet (25 mg total) by mouth every 6 (six) hours as needed for Nausea.    acetaminophen (TYLENOL) 500 MG tablet Take 500 mg by mouth every 6 (six) hours as needed for Pain.    naloxone (NARCAN) 1 mg/mL injection 2 mg (1 mg per nostril) by Nasal route as needed for opioid overdose; may repeat in 3 to 5 minutes if not effective. Call 911    [DISCONTINUED] amLODIPine (NORVASC) 5 MG tablet TAKE 1 TABLET BY MOUTH EVERY DAY     Family History       Problem Relation (Age of Onset)    Cataracts Maternal Aunt    Diabetes Father, Maternal Grandfather    Heart disease Sister    Hypertension Mother    Kidney disease Father    No Known Problems Sister, Sister, Brother, Brother, Maternal Uncle, Paternal Aunt, Paternal Uncle, Maternal Grandmother, Paternal Grandmother, Paternal Grandfather, Son, Son, Other          Tobacco Use    Smoking status: Never    Smokeless tobacco: Never   Substance and Sexual Activity    Alcohol use: No     Comment: . 2 children. works at [employer].    Drug use: Never    Sexual activity: Not on file     Review of Systems   Constitutional:  Negative for chills and fever.   HENT:  Negative for nosebleeds and tinnitus.    Eyes:  Negative for photophobia and visual disturbance.   Respiratory:  Negative for shortness of breath and wheezing.     Cardiovascular:  Negative for chest pain, palpitations and leg swelling.   Gastrointestinal:  Negative for abdominal distention, nausea and vomiting.   Genitourinary:  Negative for dysuria, flank pain and hematuria.   Musculoskeletal:  Negative for gait problem and joint swelling.   Skin:  Positive for color change and wound. Negative for rash.   Neurological:  Negative for seizures and syncope.   Objective:     Vital Signs (Most Recent):  Temp: 98.3 °F (36.8 °C) (07/20/23 0722)  Pulse: 93 (07/20/23 0722)  Resp: 17 (07/20/23 0722)  BP: (!) 143/69 (07/20/23 0722)  SpO2: 96 % (07/20/23 0722) Vital Signs (24h Range):  Temp:  [97.9 °F (36.6 °C)-99.9 °F (37.7 °C)] 98.3 °F (36.8 °C)  Pulse:  [] 93  Resp:  [17-20] 17  SpO2:  [96 %-100 %] 96 %  BP: (108-161)/(57-81) 143/69     Weight: 52.5 kg (115 lb 11.9 oz)  Body mass index is 19.87 kg/m².     Physical Exam  Vitals and nursing note reviewed.   Constitutional:       General: She is not in acute distress.     Appearance: She is well-developed. She is not diaphoretic.   HENT:      Head: Normocephalic and atraumatic.      Right Ear: External ear normal.      Left Ear: External ear normal.   Eyes:      General:         Right eye: No discharge.         Left eye: No discharge.      Conjunctiva/sclera: Conjunctivae normal.   Neck:      Thyroid: No thyromegaly.   Cardiovascular:      Rate and Rhythm: Normal rate and regular rhythm.      Heart sounds: No murmur heard.  Pulmonary:      Effort: Pulmonary effort is normal. No respiratory distress.      Breath sounds: Normal breath sounds.   Abdominal:      General: Bowel sounds are normal. There is no distension.      Palpations: Abdomen is soft. There is no mass.      Tenderness: There is no abdominal tenderness.   Musculoskeletal:         General: No deformity.      Cervical back: Normal range of motion and neck supple.      Right lower leg: No edema.      Left lower leg: No edema.      Comments: Graft left arm with palpable  thrill   Skin:     General: Skin is warm and dry.      Comments: Ulcer to middle toe of left foot, palpable dorsalis pedis pulse   Neurological:      Mental Status: She is alert and oriented to person, place, and time.      Sensory: No sensory deficit.   Psychiatric:         Mood and Affect: Mood normal.         Behavior: Behavior normal.              Significant Labs: CBC:   Recent Labs   Lab 07/19/23  1546 07/20/23  0822   WBC 6.36 8.14   HGB 6.2* 7.8*   HCT 19.8* 24.3*    182       CMP:   Recent Labs   Lab 07/19/23  1546 07/20/23  0822    143   K 4.3 4.8    106   CO2 23 23   GLU 93 87   BUN 65* 76*   CREATININE 4.8* 5.5*   CALCIUM 8.8 9.3   PROT 5.9* 6.6   ALBUMIN 2.4* 2.7*   BILITOT 0.7 1.1*   ALKPHOS 315* 397*   AST 65* 75*   ALT 40 47*   ANIONGAP 13 14       Lactic Acid:   Recent Labs   Lab 07/19/23  1546 07/19/23  1832   LACTATE 1.6 2.0         Significant Imaging:   Imaging Results               US Lower Extremity Arteries Bilateral (Final result)  Result time 07/19/23 19:46:45   Procedure changed from US Lower Extrem Arteries Bilat with NUVIA (xpd)     Final result by Jael Bowers MD (07/19/23 19:46:45)                   Narrative:    EXAMINATION:  US LOWER EXTREMITY ARTERIES BILATERAL    CLINICAL HISTORY:  foot wound;    TECHNIQUE:  Bilateral spectral, color and grayscale images of the large arteries of both lower extremities were performed.    COMPARISON:  None    FINDINGS:  Peak systolic velocities involving the right lower extremity arteries (cm/sec):    CFA: 43, triphasic    DFA: 28, biphasic    Proximal SFA: 44, triphasic    Mid SFA: 62, triphasic    Distal SFA: 62, monophasic    Proximal pop: 48, monophasic    Distal pop: 47, monophasic    NATASHA: 79, monophasic    PTA: 109, monophasic    Peroneal: 51, monophasic    DPA: 81, monophasic    Peak systolic velocities involving the left lower extremity arteries (cm/sec):    CFA: 37, biphasic    DFA: 37, triphasic    Proximal SFA: 46,  triphasic    Mid SFA: 61, triphasic    Distal SFA: 53, monophasic    Proximal pop: 47, monophasic    Distal pop: 50, monophasic    NATASHA: 86, monophasic    PTA: 27, monophasic    Peroneal: 54, monophasic    DPA: 108, monophasic    IMPRESSION  Doubling of velocity between at the right proximal SFA and the right PTA.  Findings suggest greater than 50% stenosis.    Monophasic flow from the right distal SFA through the right DPA suggesting severe peripheral vascular disease.    Doubling of velocity between the left popliteal artery and the left dorsal pedis artery.  Findings suggest greater than 50% stenosis.    Monophasic flow from the left distal SFA through the right DPA suggesting severe peripheral vascular disease.    Slower than expected velocities in bilateral common femoral and bilateral proximal superficial femoral arteries.    This report was flagged in Epic as abnormal.      Electronically signed by: Jael Bowers  Date:    07/19/2023  Time:    19:46                                     X-Ray Chest 1 View (Final result)  Result time 07/19/23 18:25:09      Final result by Gustavo Ochoa MD (07/19/23 18:25:09)                   Impression:      Increased globular enlargement of the cardiac silhouette.  Differential includes cardiomegaly, pericardial effusion or both.    Increased pulmonary vasculature.  Correlate for pulmonary venous hypertension or CHF.      Electronically signed by: Gustavo Ochoa  Date:    07/19/2023  Time:    18:25               Narrative:    EXAMINATION:  XR CHEST 1 VIEW    CLINICAL HISTORY:  Anemia, unspecified    TECHNIQUE:  Single frontal view of the chest was performed.    COMPARISON:  12/18/2022    FINDINGS:  Cardiac silhouette is increased in size with a globular configuration.  AICD mild anteriorly is again noted.  The pulmonary vasculature has increased in size with reticular opacities bilaterally but no consolidation or discrete effusion.  Vascular stent in the axillary and  brachial vessel on the left noted.  Bones intact.                                       X-Ray Foot Complete Left (Final result)  Result time 07/19/23 15:37:29      Final result by Asad Martin MD (07/19/23 15:37:29)                   Impression:      As above.      Electronically signed by: Asad Martin  Date:    07/19/2023  Time:    15:37               Narrative:    EXAMINATION:  XR FOOT COMPLETE 3 VIEW LEFT    CLINICAL HISTORY:  .  Pain, unspecified    TECHNIQUE:  AP, lateral and oblique views of the left foot were performed.    COMPARISON:  Radiographs 02/14/2023    FINDINGS:  Diffuse osteopenia.  No acute fracture or dislocation.  Joint spaces are preserved.  Achilles enthesopathy and small plantar calcaneal spur.  There is soft tissue swelling in the dorsal ankle and forefoot, which is nonspecific.  Extensive vascular calcifications.

## 2023-07-21 ENCOUNTER — ANESTHESIA (OUTPATIENT)
Dept: SURGERY | Facility: HOSPITAL | Age: 58
DRG: 252 | End: 2023-07-21
Payer: MEDICARE

## 2023-07-21 PROBLEM — I73.9 PAD (PERIPHERAL ARTERY DISEASE): Status: ACTIVE | Noted: 2023-07-21

## 2023-07-21 LAB
ALBUMIN SERPL BCP-MCNC: 2.6 G/DL (ref 3.5–5.2)
ALP SERPL-CCNC: 388 U/L (ref 55–135)
ALT SERPL W/O P-5'-P-CCNC: 54 U/L (ref 10–44)
ANION GAP SERPL CALC-SCNC: 16 MMOL/L (ref 8–16)
APTT PPP: 24.9 SEC (ref 21–32)
AST SERPL-CCNC: 71 U/L (ref 10–40)
BASOPHILS # BLD AUTO: 0.06 K/UL (ref 0–0.2)
BASOPHILS # BLD AUTO: 0.06 K/UL (ref 0–0.2)
BASOPHILS NFR BLD: 0.7 % (ref 0–1.9)
BASOPHILS NFR BLD: 0.9 % (ref 0–1.9)
BILIRUB SERPL-MCNC: 1.4 MG/DL (ref 0.1–1)
BUN SERPL-MCNC: 52 MG/DL (ref 6–20)
CALCIUM SERPL-MCNC: 9.2 MG/DL (ref 8.7–10.5)
CHLORIDE SERPL-SCNC: 103 MMOL/L (ref 95–110)
CO2 SERPL-SCNC: 19 MMOL/L (ref 23–29)
CREAT SERPL-MCNC: 4.4 MG/DL (ref 0.5–1.4)
DIFFERENTIAL METHOD: ABNORMAL
DIFFERENTIAL METHOD: ABNORMAL
EOSINOPHIL # BLD AUTO: 0.1 K/UL (ref 0–0.5)
EOSINOPHIL # BLD AUTO: 0.2 K/UL (ref 0–0.5)
EOSINOPHIL NFR BLD: 1.9 % (ref 0–8)
EOSINOPHIL NFR BLD: 2.3 % (ref 0–8)
ERYTHROCYTE [DISTWIDTH] IN BLOOD BY AUTOMATED COUNT: 18.6 % (ref 11.5–14.5)
ERYTHROCYTE [DISTWIDTH] IN BLOOD BY AUTOMATED COUNT: 18.6 % (ref 11.5–14.5)
EST. GFR  (NO RACE VARIABLE): 11 ML/MIN/1.73 M^2
GLUCOSE SERPL-MCNC: 76 MG/DL (ref 70–110)
HCT VFR BLD AUTO: 27.9 % (ref 37–48.5)
HCT VFR BLD AUTO: 30.6 % (ref 37–48.5)
HGB BLD-MCNC: 8.8 G/DL (ref 12–16)
HGB BLD-MCNC: 9.6 G/DL (ref 12–16)
IMM GRANULOCYTES # BLD AUTO: 0.07 K/UL (ref 0–0.04)
IMM GRANULOCYTES # BLD AUTO: 0.1 K/UL (ref 0–0.04)
IMM GRANULOCYTES NFR BLD AUTO: 1 % (ref 0–0.5)
IMM GRANULOCYTES NFR BLD AUTO: 1.2 % (ref 0–0.5)
INR PPP: 1 (ref 0.8–1.2)
LYMPHOCYTES # BLD AUTO: 1.2 K/UL (ref 1–4.8)
LYMPHOCYTES # BLD AUTO: 1.6 K/UL (ref 1–4.8)
LYMPHOCYTES NFR BLD: 16.4 % (ref 18–48)
LYMPHOCYTES NFR BLD: 18.4 % (ref 18–48)
MCH RBC QN AUTO: 32 PG (ref 27–31)
MCH RBC QN AUTO: 32 PG (ref 27–31)
MCHC RBC AUTO-ENTMCNC: 31.4 G/DL (ref 32–36)
MCHC RBC AUTO-ENTMCNC: 31.5 G/DL (ref 32–36)
MCV RBC AUTO: 102 FL (ref 82–98)
MCV RBC AUTO: 102 FL (ref 82–98)
MONOCYTES # BLD AUTO: 1 K/UL (ref 0.3–1)
MONOCYTES # BLD AUTO: 1.2 K/UL (ref 0.3–1)
MONOCYTES NFR BLD: 14.2 % (ref 4–15)
MONOCYTES NFR BLD: 14.3 % (ref 4–15)
NEUTROPHILS # BLD AUTO: 4.6 K/UL (ref 1.8–7.7)
NEUTROPHILS # BLD AUTO: 5.4 K/UL (ref 1.8–7.7)
NEUTROPHILS NFR BLD: 63.1 % (ref 38–73)
NEUTROPHILS NFR BLD: 65.6 % (ref 38–73)
NRBC BLD-RTO: 0 /100 WBC
NRBC BLD-RTO: 0 /100 WBC
PLATELET # BLD AUTO: 182 K/UL (ref 150–450)
PLATELET # BLD AUTO: 212 K/UL (ref 150–450)
PMV BLD AUTO: 10.2 FL (ref 9.2–12.9)
PMV BLD AUTO: 10.3 FL (ref 9.2–12.9)
POCT GLUCOSE: 82 MG/DL (ref 70–110)
POTASSIUM SERPL-SCNC: 4.2 MMOL/L (ref 3.5–5.1)
POTASSIUM SERPL-SCNC: 4.5 MMOL/L (ref 3.5–5.1)
PROT SERPL-MCNC: 6.5 G/DL (ref 6–8.4)
PROTHROMBIN TIME: 10.7 SEC (ref 9–12.5)
RBC # BLD AUTO: 2.75 M/UL (ref 4–5.4)
RBC # BLD AUTO: 3 M/UL (ref 4–5.4)
SODIUM SERPL-SCNC: 138 MMOL/L (ref 136–145)
WBC # BLD AUTO: 7.02 K/UL (ref 3.9–12.7)
WBC # BLD AUTO: 8.54 K/UL (ref 3.9–12.7)

## 2023-07-21 PROCEDURE — 87116 MYCOBACTERIA CULTURE: CPT | Mod: 59 | Performed by: SURGERY

## 2023-07-21 PROCEDURE — 11000001 HC ACUTE MED/SURG PRIVATE ROOM

## 2023-07-21 PROCEDURE — 71000033 HC RECOVERY, INTIAL HOUR: Performed by: SURGERY

## 2023-07-21 PROCEDURE — 25000003 PHARM REV CODE 250: Performed by: NURSE ANESTHETIST, CERTIFIED REGISTERED

## 2023-07-21 PROCEDURE — 25000003 PHARM REV CODE 250: Performed by: HOSPITALIST

## 2023-07-21 PROCEDURE — D9220A PRA ANESTHESIA: Mod: ANES,,, | Performed by: ANESTHESIOLOGY

## 2023-07-21 PROCEDURE — 99024 PR POST-OP FOLLOW-UP VISIT: ICD-10-PCS | Mod: ,,, | Performed by: SURGERY

## 2023-07-21 PROCEDURE — 37000009 HC ANESTHESIA EA ADD 15 MINS: Performed by: SURGERY

## 2023-07-21 PROCEDURE — 25000003 PHARM REV CODE 250: Performed by: RADIOLOGY

## 2023-07-21 PROCEDURE — A4216 STERILE WATER/SALINE, 10 ML: HCPCS | Performed by: NURSE ANESTHETIST, CERTIFIED REGISTERED

## 2023-07-21 PROCEDURE — 36000707: Performed by: SURGERY

## 2023-07-21 PROCEDURE — 87070 CULTURE OTHR SPECIMN AEROBIC: CPT | Mod: 59 | Performed by: SURGERY

## 2023-07-21 PROCEDURE — 37000008 HC ANESTHESIA 1ST 15 MINUTES: Performed by: SURGERY

## 2023-07-21 PROCEDURE — 85730 THROMBOPLASTIN TIME PARTIAL: CPT | Performed by: HOSPITALIST

## 2023-07-21 PROCEDURE — 63600175 PHARM REV CODE 636 W HCPCS: Performed by: NURSE ANESTHETIST, CERTIFIED REGISTERED

## 2023-07-21 PROCEDURE — 35903 EXCISION GRAFT EXTREMITY: CPT | Mod: 78,,, | Performed by: SURGERY

## 2023-07-21 PROCEDURE — 85025 COMPLETE CBC W/AUTO DIFF WBC: CPT | Mod: 91 | Performed by: HOSPITALIST

## 2023-07-21 PROCEDURE — 36000706: Performed by: SURGERY

## 2023-07-21 PROCEDURE — 87077 CULTURE AEROBIC IDENTIFY: CPT | Mod: 59 | Performed by: SURGERY

## 2023-07-21 PROCEDURE — 25000003 PHARM REV CODE 250: Performed by: SURGERY

## 2023-07-21 PROCEDURE — 85610 PROTHROMBIN TIME: CPT | Performed by: HOSPITALIST

## 2023-07-21 PROCEDURE — 87075 CULTR BACTERIA EXCEPT BLOOD: CPT | Mod: 59 | Performed by: SURGERY

## 2023-07-21 PROCEDURE — 63600175 PHARM REV CODE 636 W HCPCS: Performed by: SURGERY

## 2023-07-21 PROCEDURE — 35903 PR EXCISION, INFEC GRAFT, EXTREMITY: ICD-10-PCS | Mod: 78,,, | Performed by: SURGERY

## 2023-07-21 PROCEDURE — 25000003 PHARM REV CODE 250

## 2023-07-21 PROCEDURE — D9220A PRA ANESTHESIA: ICD-10-PCS | Mod: ANES,,, | Performed by: ANESTHESIOLOGY

## 2023-07-21 PROCEDURE — 63600175 PHARM REV CODE 636 W HCPCS: Performed by: RADIOLOGY

## 2023-07-21 PROCEDURE — D9220A PRA ANESTHESIA: ICD-10-PCS | Mod: CRNA,,, | Performed by: NURSE ANESTHETIST, CERTIFIED REGISTERED

## 2023-07-21 PROCEDURE — 87106 FUNGI IDENTIFICATION YEAST: CPT | Performed by: SURGERY

## 2023-07-21 PROCEDURE — 63600175 PHARM REV CODE 636 W HCPCS: Performed by: PHYSICIAN ASSISTANT

## 2023-07-21 PROCEDURE — 87205 SMEAR GRAM STAIN: CPT | Mod: 59 | Performed by: SURGERY

## 2023-07-21 PROCEDURE — 36415 COLL VENOUS BLD VENIPUNCTURE: CPT | Performed by: ANESTHESIOLOGY

## 2023-07-21 PROCEDURE — 87107 FUNGI IDENTIFICATION MOLD: CPT | Performed by: SURGERY

## 2023-07-21 PROCEDURE — 63600175 PHARM REV CODE 636 W HCPCS: Performed by: ANESTHESIOLOGY

## 2023-07-21 PROCEDURE — 36415 COLL VENOUS BLD VENIPUNCTURE: CPT | Performed by: HOSPITALIST

## 2023-07-21 PROCEDURE — D9220A PRA ANESTHESIA: Mod: CRNA,,, | Performed by: NURSE ANESTHETIST, CERTIFIED REGISTERED

## 2023-07-21 PROCEDURE — 80053 COMPREHEN METABOLIC PANEL: CPT | Performed by: HOSPITALIST

## 2023-07-21 PROCEDURE — 87186 SC STD MICRODIL/AGAR DIL: CPT | Mod: 59 | Performed by: SURGERY

## 2023-07-21 PROCEDURE — 99024 POSTOP FOLLOW-UP VISIT: CPT | Mod: ,,, | Performed by: SURGERY

## 2023-07-21 PROCEDURE — 87206 SMEAR FLUORESCENT/ACID STAI: CPT | Mod: 91 | Performed by: SURGERY

## 2023-07-21 PROCEDURE — 87102 FUNGUS ISOLATION CULTURE: CPT | Mod: 59 | Performed by: SURGERY

## 2023-07-21 PROCEDURE — 27201423 OPTIME MED/SURG SUP & DEVICES STERILE SUPPLY: Performed by: SURGERY

## 2023-07-21 PROCEDURE — 25000003 PHARM REV CODE 250: Performed by: PHYSICIAN ASSISTANT

## 2023-07-21 PROCEDURE — 84132 ASSAY OF SERUM POTASSIUM: CPT | Performed by: ANESTHESIOLOGY

## 2023-07-21 RX ORDER — FENTANYL CITRATE 50 UG/ML
INJECTION, SOLUTION INTRAMUSCULAR; INTRAVENOUS
Status: DISCONTINUED | OUTPATIENT
Start: 2023-07-21 | End: 2023-07-21

## 2023-07-21 RX ORDER — MIDAZOLAM HYDROCHLORIDE 1 MG/ML
INJECTION, SOLUTION INTRAMUSCULAR; INTRAVENOUS
Status: DISCONTINUED | OUTPATIENT
Start: 2023-07-21 | End: 2023-07-21

## 2023-07-21 RX ORDER — LIDOCAINE HYDROCHLORIDE 10 MG/ML
INJECTION INFILTRATION; PERINEURAL
Status: COMPLETED | OUTPATIENT
Start: 2023-07-21 | End: 2023-07-21

## 2023-07-21 RX ORDER — LIDOCAINE HYDROCHLORIDE 10 MG/ML
INJECTION, SOLUTION EPIDURAL; INFILTRATION; INTRACAUDAL; PERINEURAL
Status: DISCONTINUED | OUTPATIENT
Start: 2023-07-21 | End: 2023-07-21 | Stop reason: HOSPADM

## 2023-07-21 RX ORDER — VANCOMYCIN HYDROCHLORIDE 1 G/20ML
INJECTION, POWDER, LYOPHILIZED, FOR SOLUTION INTRAVENOUS
Status: DISCONTINUED | OUTPATIENT
Start: 2023-07-21 | End: 2023-07-21 | Stop reason: HOSPADM

## 2023-07-21 RX ORDER — FENTANYL CITRATE 50 UG/ML
INJECTION, SOLUTION INTRAMUSCULAR; INTRAVENOUS
Status: COMPLETED | OUTPATIENT
Start: 2023-07-21 | End: 2023-07-21

## 2023-07-21 RX ORDER — KETAMINE HYDROCHLORIDE 100 MG/ML
INJECTION, SOLUTION INTRAMUSCULAR; INTRAVENOUS
Status: DISCONTINUED | OUTPATIENT
Start: 2023-07-21 | End: 2023-07-21

## 2023-07-21 RX ORDER — HEPARIN SODIUM 1000 [USP'U]/ML
INJECTION, SOLUTION INTRAVENOUS; SUBCUTANEOUS
Status: DISCONTINUED | OUTPATIENT
Start: 2023-07-21 | End: 2023-07-21 | Stop reason: HOSPADM

## 2023-07-21 RX ORDER — HYDROCORTISONE 25 MG/G
CREAM TOPICAL 2 TIMES DAILY
Status: DISCONTINUED | OUTPATIENT
Start: 2023-07-21 | End: 2023-07-29 | Stop reason: HOSPADM

## 2023-07-21 RX ADMIN — MIDAZOLAM HYDROCHLORIDE 0.5 MG: 1 INJECTION, SOLUTION INTRAMUSCULAR; INTRAVENOUS at 02:07

## 2023-07-21 RX ADMIN — FENTANYL CITRATE 25 MCG: 50 INJECTION, SOLUTION INTRAMUSCULAR; INTRAVENOUS at 02:07

## 2023-07-21 RX ADMIN — BUPIVACAINE HYDROCHLORIDE 15 ML: 5 INJECTION, SOLUTION EPIDURAL; INTRACAUDAL; PERINEURAL at 10:07

## 2023-07-21 RX ADMIN — DEXMEDETOMIDINE HYDROCHLORIDE 2 MCG: 100 INJECTION, SOLUTION, CONCENTRATE INTRAVENOUS at 12:07

## 2023-07-21 RX ADMIN — FENTANYL CITRATE 50 MCG: 50 INJECTION INTRAMUSCULAR; INTRAVENOUS at 04:07

## 2023-07-21 RX ADMIN — SODIUM CHLORIDE: 0.9 INJECTION, SOLUTION INTRAVENOUS at 11:07

## 2023-07-21 RX ADMIN — CEFTRIAXONE 1 G: 1 INJECTION, POWDER, FOR SOLUTION INTRAMUSCULAR; INTRAVENOUS at 07:07

## 2023-07-21 RX ADMIN — KETAMINE HYDROCHLORIDE 2 MG: 100 INJECTION, SOLUTION, CONCENTRATE INTRAMUSCULAR; INTRAVENOUS at 12:07

## 2023-07-21 RX ADMIN — LIDOCAINE HYDROCHLORIDE 10 ML: 10 INJECTION, SOLUTION INFILTRATION; PERINEURAL at 04:07

## 2023-07-21 RX ADMIN — MIDAZOLAM HYDROCHLORIDE 1 MG: 1 INJECTION, SOLUTION INTRAMUSCULAR; INTRAVENOUS at 11:07

## 2023-07-21 RX ADMIN — DEXMEDETOMIDINE HYDROCHLORIDE 0.1 MCG/KG/HR: 100 INJECTION, SOLUTION, CONCENTRATE INTRAVENOUS at 01:07

## 2023-07-21 RX ADMIN — LEVOTHYROXINE SODIUM 75 MCG: 0.03 TABLET ORAL at 09:07

## 2023-07-21 RX ADMIN — CEFAZOLIN SODIUM 2 G: 1 POWDER, FOR SOLUTION INTRAMUSCULAR; INTRAVENOUS at 12:07

## 2023-07-21 RX ADMIN — MIDAZOLAM HYDROCHLORIDE 0.5 MG: 1 INJECTION, SOLUTION INTRAMUSCULAR; INTRAVENOUS at 11:07

## 2023-07-21 RX ADMIN — DEXMEDETOMIDINE HYDROCHLORIDE 2 MCG: 100 INJECTION, SOLUTION, CONCENTRATE INTRAVENOUS at 11:07

## 2023-07-21 RX ADMIN — FENTANYL CITRATE 25 MCG: 50 INJECTION, SOLUTION INTRAMUSCULAR; INTRAVENOUS at 12:07

## 2023-07-21 RX ADMIN — HYDROCODONE BITARTRATE AND ACETAMINOPHEN 1 TABLET: 5; 325 TABLET ORAL at 08:07

## 2023-07-21 RX ADMIN — FENTANYL CITRATE 50 MCG: 50 INJECTION, SOLUTION INTRAMUSCULAR; INTRAVENOUS at 11:07

## 2023-07-21 RX ADMIN — METOPROLOL SUCCINATE 50 MG: 50 TABLET, EXTENDED RELEASE ORAL at 09:07

## 2023-07-21 NOTE — CONSULTS
Inpatient Radiology Pre-procedure Note    History of Present Illness:  Eva Bloom is a 57 y.o. Rt-handed female with pertinent PMHx of ESRD on HD TTS via LUE AVG who is admitted with malfunction of the LUE AVG requiring revision vs new AVF/AVG creation. Patient will require alternative route of long-ter central venous access for HD in the interim while revised vs new AVF/AVG 'matures' and is safe for use.    A new inpatient IR consult received for US and fluoroscopic-guided placement of an RIJV-approach THDC.    Admission H&P reviewed.  Past Medical History:   Diagnosis Date    Anemia     Atrial fibrillation     Bronchitis 03/01/2017    Cancer 2016    kidney cancer    CHF (congestive heart failure), NYHA class II, chronic, systolic     CMV (cytomegalovirus) antibody positive     Encounter for blood transfusion     ESRD (end stage renal disease)     Essential hypertension 09/23/2015    H/O herpes simplex type 2 infection     Herpes simplex type 1 antibody positive     History of kidney cancer     s/p left nephrectomy 1/2016    Hyperparathyroidism, unspecified     Hyperuricemia without signs of inflammatory arthritis and tophaceous disease     Kidney stones     LGSIL (low grade squamous intraepithelial dysplasia)     Myocardiopathy 07/21/2017    Prediabetes     Proteinuria     Renal disorder     Thyroid nodule     Urate nephropathy      Past Surgical History:   Procedure Laterality Date    BREAST CYST EXCISION      COLONOSCOPY N/A 11/12/2015    COLONOSCOPY N/A 3/12/2021    Procedure: COLONOSCOPY;  Surgeon: Brendon Lanier MD;  Location: [facility];  Service: Endoscopy;  Laterality: N/A;  covid test 3/9, labs prior, prep instr mailed -ml    INSERTION OF BIVENTRICULAR IMPLANTABLE CARDIOVERTER-DEFIBRILLATOR (ICD)  04/2021    INSERTION OF TUNNELED CENTRAL VENOUS CATHETER (CVC) WITH SUBCUTANEOUS PORT N/A 1/25/2023    Procedure: INSERTION, DUAL LUMEN CATHETER WITH PORT, WITH IMAGING GUIDANCE;  Surgeon: FARIDEH CHEN  Toni SAM MD;  [facility];  Service: Peripheral Vascular;  Laterality: N/A;  possinble permcath placment    NEPHRECTOMY-LAPAROSCOPIC Left 01/12/2016    PERCUTANEOUS TRANSLUMINAL ANGIOPLASTY OF ARTERIOVENOUS FISTULA Left 4/19/2023    Procedure: PTA, AV FISTULA;  Surgeon: FARIDEH Muñoz III, MD;  Location: [facility] CATH LAB;  Service: Peripheral Vascular;  Laterality: Left;    PERITONEAL CATHETER INSERTION      Permacath insertion  01/12/2017    PLACEMENT OF ARTERIOVENOUS GRAFT  12/28/2022    Procedure: INSERTION, GRAFT, ARTERIOVENOUS;  Surgeon: FARIDEH Muñoz III, MD;  [facility];  Service: Peripheral Vascular;;    REMOVAL, GRAFT, ARTERIOVENOUS, UPPER EXTREMITY Left 1/25/2023    Procedure: REMOVAL, GRAFT, ARTERIOVENOUS, UPPER EXTREMITY;  Surgeon: FARIDEH Muñoz III, MD;  [facility];  Service: Peripheral Vascular;  Laterality: Left;    REVISION OF ARTERIOVENOUS FISTULA Left 5/1/2023    Procedure: REVISION, AV FISTULA;  Surgeon: FARIDEH Muñoz III, MD;  [facility];  Service: Peripheral Vascular;  Laterality: Left;  LUE AVG revision    REVISION OF PROCEDURE INVOLVING ARTERIOVENOUS GRAFT Left 12/28/2022    Procedure: REVISION, PROCEDURE INVOLVING ARTERIOVENOUS GRAFT;  Surgeon: FARIDEH Muñoz III, MD;  [facility];  Service: Peripheral Vascular;  Laterality: Left;    SALPINGOOPHORECTOMY Right 2016    KJB---DAVINCI    TONSILLECTOMY      TUBAL LIGATION       Review of Systems:   As documented in primary team H&P    Home Meds:   Prior to Admission medications    Medication Sig Start Date End Date Taking? Authorizing Provider   apixaban (ELIQUIS) 2.5 mg Tab Take 1 tablet (2.5 mg total) by mouth 2 (two) times daily. 9/14/22  Yes Sowmya Stock NP   cabozantinib (CABOMETYX) 60 mg Tab TAKE ONE TABLET BY MOUTH ONCE DAILY AT THE SAME TIME ON AN EMPTY STOMACH AT LEAST 1 HOUR BEFORE OR 2 HOURS AFTER EATING. AVOID GRAPEFRUIT PRODUCTS 3/29/23  Yes Liset LEONARDO  TED Ngo   cloNIDine 0.3 mg/24 hr td ptwk (CATAPRES) 0.3 mg/24 hr Place 1 patch onto the skin every 7 days. 10/26/22 10/26/23 Yes Sowmya Mccain MD   cyclobenzaprine (FLEXERIL) 5 MG tablet Take 1 tablet (5 mg total) by mouth daily as needed for Muscle spasms. 7/12/23 9/10/23 Yes Ben Rivera MD   gabapentin (NEURONTIN) 100 MG capsule Take 1 capsule (100 mg total) by mouth once daily. 12/6/22 12/6/23 Yes Monisha Waller NP   hydrALAZINE (APRESOLINE) 100 MG tablet Take 1 tablet (100 mg total) by mouth every 12 (twelve) hours. 10/24/22 7/19/23 Yes Edson Murdock MD   HYDROcodone-acetaminophen (NORCO) 5-325 mg per tablet Take 1 tablet by mouth every 6 (six) hours as needed for Pain. 5/1/23  Yes Brenden Celeste MD   levothyroxine (SYNTHROID) 75 MCG tablet Take 1 tablet (75 mcg total) by mouth once daily. 3/29/23 3/28/24 Yes Ben Rivera MD   lidocaine-prilocaine (EMLA) cream APPLY ATLEAST 30 MINUTES BEFORE TREATMENT 3 TIMES A WEEK 2/11/20  Yes TONA Millard   metoprolol succinate (TOPROL-XL) 100 MG 24 hr tablet Take 1/2 tablet (50mg) once daily 12/19/22  Yes Leila Scott NP   mv,Ca,min-folic acid-vit K1 (ONE-A-DAY WOMEN'S 50 PLUS) 400-20 mcg Tab Take 1 tablet by mouth once daily. 3/10/22  Yes Historical Provider   polyethylene glycol (GLYCOLAX) 17 gram/dose powder Take 17 g by mouth once daily. 2/14/23  Yes Rasta Garsia Jr., MD   promethazine (PHENERGAN) 25 MG tablet Take 1 tablet (25 mg total) by mouth every 6 (six) hours as needed for Nausea. 10/25/22  Yes Leeroy Nix MD   acetaminophen (TYLENOL) 500 MG tablet Take 500 mg by mouth every 6 (six) hours as needed for Pain.    Historical Provider   naloxone (NARCAN) 1 mg/mL injection 2 mg (1 mg per nostril) by Nasal route as needed for opioid overdose; may repeat in 3 to 5 minutes if not effective. Call 911 2/14/23   Rasta Garsia Jr., MD   amLODIPine (NORVASC) 5 MG tablet TAKE 1 TABLET BY MOUTH EVERY DAY 10/14/21 6/29/22   Williams Hart MD     Scheduled Meds:    cefTRIAXone (ROCEPHIN) IVPB  1 g Intravenous Q24H    hydrocortisone   Rectal BID    levothyroxine  75 mcg Oral Daily    metoprolol succinate  50 mg Oral Daily    mupirocin   Nasal BID     Continuous Infusions:   PRN Meds:sodium chloride, sodium chloride, sodium chloride 0.9%, acetaminophen, dextrose 50%, dextrose 50%, glucagon (human recombinant), glucose, glucose, HYDROcodone-acetaminophen, magnesium oxide, magnesium oxide, melatonin, naloxone, senna-docusate 8.6-50 mg, sodium chloride 0.9%, sodium chloride 0.9%, Pharmacy to dose Vancomycin consult **AND** vancomycin - pharmacy to dose    Anticoagulants/Antiplatelets: no anticoagulation    Allergies:   Review of patient's allergies indicates:   Allergen Reactions    Coreg [carvedilol] Other (See Comments)     Nausea/vomiting    Allopurinol      Other reaction(s): abnormal transaminases     Sedation Hx: have not been any systemic reactions    Labs:  No results for input(s): INR in the last 168 hours.    Invalid input(s):  PT,  PTT    Recent Labs   Lab 07/21/23 0747   WBC 8.54   HGB 9.6*   HCT 30.6*   *         Recent Labs   Lab 07/20/23  0822 07/21/23 0747   GLU 87  --      --    K 4.8 4.2     --    CO2 23  --    BUN 76*  --    CREATININE 5.5*  --    CALCIUM 9.3  --    MG 2.6  --    ALT 47*  --    AST 75*  --    ALBUMIN 2.7*  --    BILITOT 1.1*  --      Vitals:  Temp: 98.2 °F (36.8 °C) (07/21/23 0714)  Pulse: 94 (07/21/23 0925)  Resp: 18 (07/21/23 0714)  BP: 135/76 (07/21/23 0925)  SpO2: 96 % (07/21/23 0714)     Physical Exam:  ASA: II  Mallampati: II    General: no acute distress  Mental Status: alert and oriented to person, place and time  HEENT: normocephalic, atraumatic  Chest: unlabored breathing  Heart: regular heart rate  Abdomen: nondistended  Extremity: moves all extremities    A/P:  57 y.o. Rt-handed female with pertinent PMHx of ESRD on HD TTS via LUE AVG who is admitted with  malfunction of the LUE AVG requiring revision vs new AVF/AVG creation. Patient will require alternative route of long-ter central venous access for HD in the interim while revised vs new AVF/AVG 'matures' and is safe for use.    ESRD with malfunction of LUE AVF/AVG - Will attempt US and fluoroscopic-guided placement of an RIJV-approach THDC with local anesthetic and moderate conscious sedation at approximately 1500 today.    Please continue to hold all non-essential anti-platelets, anti-coagulants and keep pt NPO.    Risks (including, but not limited to, pain, bleeding, infection, damage to nearby structures, failure to obtain sufficient material for a diagnosis, the need for additional procedures, and death), benefits, and alternatives were discussed with the patient and  All questions were answered to the best of my abilities. The patient and  wish to proceed with the procedure. Written informed consent was obtained.    Thank you for considering IR for the care of your patient.     Pan Win MD  Interventional Radiology

## 2023-07-21 NOTE — NURSING
[facility] [place]  Nurses Note -- 4 Eyes      7/20/2023       Skin assessed on: Q Shift      [x] No Pressure Injuries Present    []Prevention Measures Documented    [] Yes LDA  for Pressure Injury Previously documented     [] Yes New Pressure Injury Discovered   [] LDA for New Pressure Injury Added      Attending RN:  Lynne Islas RN     Second RN:  stephanie dumont

## 2023-07-21 NOTE — NURSING
[facility]  Rapid Response Nurse Intervention/ Task    Date of Visit: 07/20/2023  Time of Visit: 2100       INTERVENTIONS/ TASK Completed:     20g 1 3/4in IV placed to R UA using US guidance. Pt tolerated well. Primary RN Izabella notified. LDA placed in chart.

## 2023-07-21 NOTE — OR NURSING
1058 Patient connected to cardiac monitor.  Dr. Villarreal at bedside for timeout and to begin block.    1113 - Procedure complete.  Patient tolerated well.  No problems noted.  VS remained stable.

## 2023-07-21 NOTE — ASSESSMENT & PLAN NOTE
Presents with ulcer to left toe after mechanical injury 1 month ago. Afebrile without leukocytosis. X-ray without fracture or dislocation  Podiatry consulted  Continue rocephin/vanc  Will check ABIs  Blood cultures pending,  podiatry is consulted  and no plan for intervention until PAD is addressed by vascular surgery,US arterial show severe  PAD,.plan for angiography next week

## 2023-07-21 NOTE — BRIEF OP NOTE
[facility] S[facility]  Brief Operative Note    SUMMARY     Surgery Date: 7/21/2023     Surgeon(s) and Role:     * Obi Werner MD - Primary    Assisting Surgeon: None    Pre-op Diagnosis:  Arteriovenous graft infection, initial encounter [T82.7XXA]    Post-op Diagnosis:  Post-Op Diagnosis Codes:     * Arteriovenous graft infection, initial encounter [T82.7XXA]    Procedure(s) (LRB):  LEFT ARM ARTERIOVENOUS GRAFT EXCISION  AND LIGATION (Left)    Anesthesia: Choice    Operative Findings: exposed and infected AVG s/p resection.  No gross purulence noted.  No feasible options for placing a new AVG. Arterial and venous ends of graft were accessed (first) via separate incisions and appeared well incorporated without infection. The arterial and venous ends of AVG ligated with mattress 5-0 proline. Adjacent arterial and venous ends of the graft were resected separately and sent for culture separately from the mid portion of graft when was clearly infected.          Estimated Blood Loss: 100 mL    Estimated Blood Loss has been documented.         Specimens:   Specimen (24h ago, onward)      None            [id number]

## 2023-07-21 NOTE — ASSESSMENT & PLAN NOTE
Nephrology consulted for HD,  AVG malfunction,vascular planing doing, Plan for Left arm exposed AVG excision with possible ligation vs revision with new AVG placement.

## 2023-07-21 NOTE — PROGRESS NOTES
Awaiting random vanc trough lab draw and result; timed and due every [id number]-th-sa  at 0600.  (Next scheduled 7-22-23 at 0600)  Will continue to monitor.  Thank you for the consult.

## 2023-07-21 NOTE — ASSESSMENT & PLAN NOTE
Presents with hemoglobin of 6.2. previously 8 to 9 2 weeks ago. Denies active bleeding. Heme-positive in ED.   Type and screen and transfuse 1 unit  Will hold home eliquis as heme positive   GI consulted  Repeat CBC  HH is improved.

## 2023-07-21 NOTE — PROGRESS NOTES
[facility] Medicine  Progress Note    Patient Name: Eva Bloom  MRN: [id number]  Patient Class: IP- Inpatient   Admission Date: 7/19/2023  Length of Stay: 1 days  Attending Physician: José Miguel Cristina MD  Primary Care Provider: Sowmya Mccain MD        Subjective:     Principal Problem:Toe ulcer        HPI:  Eva Bloom 57 y.o. female with afib on eliquis, HTN, CHF, ESRD on HD T/TH/S, RCC presents to the hospital chief complaint of toe wound.  She reports 1 month an ulcer to left toe that occurred after a chair rolled over foot.  He is had increasing pain to the toe described as a throbbing.  She attempted treatment at home with ice without improvement.  She is dialyzed Tuesday Thursday Saturday at [facility].  She no longer makes urine.  She denies fever chest pain shortness breast nausea vomiting abdominal pain melena hematuria hematemesis dizziness syncope.    In the ED, afebrile without leukocytosis x-ray foot without fracture or dislocation potassium 4.3 lactic acid negative heme-positive stool.      Overview/Hospital Course:  Eva Bloom 57 y.o. female with afib on eliquis, HTN, CHF, ESRD on HD T/TH/S, RCC presents to the hospital chief complaint of toe wound.  She reports 1 month an ulcer to left toe that occurred after a chair rolled over foot.  He is had increasing pain to the toe described as a throbbing.  She attempted treatment at home with ice without improvement.  She is dialyzed Tuesday Thursday Saturday at [facility] in [place].  She no longer makes urine.  She denies fever chest pain shortness breast nausea vomiting abdominal pain melena hematuria hematemesis dizziness syncope.    In the ED, afebrile without leukocytosis x-ray foot without fracture or dislocation potassium 4.3 lactic acid negative heme-positive stool.podiatry is consulted  and no plan for intervention until PAD is addressed by vascular surgery,US arterial show bilateral 50%  stenosis,contacted vascular surgery.vascular also planing Plan for Left arm exposed AVG excision with possible ligation vs revision with new AVG placement.  Per GI patient has hemorrhoid,started on hydrocortisone,eliquis on hold at this time.HH is improved with pack of RBC.    A/P,patient came with left toe ulcer ,X ray show no osteo,but patient has PAD, and AVG malfunction,vascular planing doing, Plan for Left arm exposed AVG excision with possible ligation vs revision with new AVG placement.  Podiatry say patient need revascularization of leg,notified vascular,GI say patient has hemorrhoid,no endoscopy by GI,eliquis remains on Hold and HH is improved with pack of RBC.  Vascular want do leg angiography next week.       Past Medical History:   Diagnosis Date    Anemia     Atrial fibrillation     Bronchitis 03/01/2017    Cancer 2016    kidney cancer    CHF (congestive heart failure), NYHA class II, chronic, systolic     CMV (cytomegalovirus) antibody positive     Encounter for blood transfusion     ESRD (end stage renal disease)     Essential hypertension 09/23/2015    H/O herpes simplex type 2 infection     Herpes simplex type 1 antibody positive     History of kidney cancer     s/p left nephrectomy 1/2016    Hyperparathyroidism, unspecified     Hyperuricemia without signs of inflammatory arthritis and tophaceous disease     Kidney stones     LGSIL (low grade squamous intraepithelial dysplasia)     Myocardiopathy 07/21/2017    Prediabetes     Proteinuria     Renal disorder     Thyroid nodule     Urate nephropathy        Past Surgical History:   Procedure Laterality Date    BREAST CYST EXCISION      COLONOSCOPY N/A 11/12/2015    COLONOSCOPY N/A 3/12/2021    Procedure: COLONOSCOPY;  Surgeon: Brendon Lanier MD;  Location: [facility];  Service: Endoscopy;  Laterality: N/A;  covid test 3/9, labs prior, prep instr mailed -ml    INSERTION OF BIVENTRICULAR IMPLANTABLE CARDIOVERTER-DEFIBRILLATOR  (ICD)  04/2021    INSERTION OF TUNNELED CENTRAL VENOUS CATHETER (CVC) WITH SUBCUTANEOUS PORT N/A 1/25/2023    Procedure: INSERTION, DUAL LUMEN CATHETER WITH PORT, WITH IMAGING GUIDANCE;  Surgeon: FARIDEH Muñoz III, MD;  [facility];  Service: Peripheral Vascular;  Laterality: N/A;  possinble permcath placment    NEPHRECTOMY-LAPAROSCOPIC Left 01/12/2016    PERCUTANEOUS TRANSLUMINAL ANGIOPLASTY OF ARTERIOVENOUS FISTULA Left 4/19/2023    Procedure: PTA, AV FISTULA;  Surgeon: FARIDEH Muñoz III, MD;  Location: [facility] CATH LAB;  Service: Peripheral Vascular;  Laterality: Left;    PERITONEAL CATHETER INSERTION      Permacath insertion  01/12/2017    PLACEMENT OF ARTERIOVENOUS GRAFT  12/28/2022    Procedure: INSERTION, GRAFT, ARTERIOVENOUS;  Surgeon: FARIDEH Muñoz III, MD;  [facility];  Service: Peripheral Vascular;;    REMOVAL, GRAFT, ARTERIOVENOUS, UPPER EXTREMITY Left 1/25/2023    Procedure: REMOVAL, GRAFT, ARTERIOVENOUS, UPPER EXTREMITY;  Surgeon: FARIDEH Muñoz III, MD;  [facility];  Service: Peripheral Vascular;  Laterality: Left;    REVISION OF ARTERIOVENOUS FISTULA Left 5/1/2023    Procedure: REVISION, AV FISTULA;  Surgeon: FARIDEH Muñoz III, MD;  Location: [facility] OR [facility]LR;  Service: Peripheral Vascular;  Laterality: Left;  LUE AVG revision    REVISION OF PROCEDURE INVOLVING ARTERIOVENOUS GRAFT Left 12/28/2022    Procedure: REVISION, PROCEDURE INVOLVING ARTERIOVENOUS GRAFT;  Surgeon: FARIDEH Muñoz III, MD;  [facility];  Service: Peripheral Vascular;  Laterality: Left;    SALPINGOOPHORECTOMY Right 2016    KJB---DAVINCI    TONSILLECTOMY      TUBAL LIGATION         Review of patient's allergies indicates:   Allergen Reactions    Coreg [carvedilol] Other (See Comments)     Nausea/vomiting    Allopurinol      Other reaction(s): abnormal transaminases       Current Facility-Administered Medications on File Prior to Encounter   Medication     0.9%  NaCl infusion     Current Outpatient Medications on File Prior to Encounter   Medication Sig    apixaban (ELIQUIS) 2.5 mg Tab Take 1 tablet (2.5 mg total) by mouth 2 (two) times daily.    cabozantinib (CABOMETYX) 60 mg Tab TAKE ONE TABLET BY MOUTH ONCE DAILY AT THE SAME TIME ON AN EMPTY STOMACH AT LEAST 1 HOUR BEFORE OR 2 HOURS AFTER EATING. AVOID GRAPEFRUIT PRODUCTS    cloNIDine 0.3 mg/24 hr td ptwk (CATAPRES) 0.3 mg/24 hr Place 1 patch onto the skin every 7 days.    cyclobenzaprine (FLEXERIL) 5 MG tablet Take 1 tablet (5 mg total) by mouth daily as needed for Muscle spasms.    gabapentin (NEURONTIN) 100 MG capsule Take 1 capsule (100 mg total) by mouth once daily.    hydrALAZINE (APRESOLINE) 100 MG tablet Take 1 tablet (100 mg total) by mouth every 12 (twelve) hours.    HYDROcodone-acetaminophen (NORCO) 5-325 mg per tablet Take 1 tablet by mouth every 6 (six) hours as needed for Pain.    levothyroxine (SYNTHROID) 75 MCG tablet Take 1 tablet (75 mcg total) by mouth once daily.    lidocaine-prilocaine (EMLA) cream APPLY ATLEAST 30 MINUTES BEFORE TREATMENT 3 TIMES A WEEK    metoprolol succinate (TOPROL-XL) 100 MG 24 hr tablet Take 1/2 tablet (50mg) once daily    mv,Ca,min-folic acid-vit K1 (ONE-A-DAY WOMEN'S 50 PLUS) 400-20 mcg Tab Take 1 tablet by mouth once daily.    polyethylene glycol (GLYCOLAX) 17 gram/dose powder Take 17 g by mouth once daily.    promethazine (PHENERGAN) 25 MG tablet Take 1 tablet (25 mg total) by mouth every 6 (six) hours as needed for Nausea.    acetaminophen (TYLENOL) 500 MG tablet Take 500 mg by mouth every 6 (six) hours as needed for Pain.    naloxone (NARCAN) 1 mg/mL injection 2 mg (1 mg per nostril) by Nasal route as needed for opioid overdose; may repeat in 3 to 5 minutes if not effective. Call 911    [DISCONTINUED] amLODIPine (NORVASC) 5 MG tablet TAKE 1 TABLET BY MOUTH EVERY DAY     Family History       Problem Relation (Age of Onset)    Cataracts Maternal Aunt     Diabetes Father, Maternal Grandfather    Heart disease Sister    Hypertension Mother    Kidney disease Father    No Known Problems Sister, Sister, Brother, Brother, Maternal Uncle, Paternal Aunt, Paternal Uncle, Maternal Grandmother, Paternal Grandmother, Paternal Grandfather, Son, Son, Other          Tobacco Use    Smoking status: Never    Smokeless tobacco: Never   Substance and Sexual Activity    Alcohol use: No     Comment: . 2 children. works at [employer].    Drug use: Never    Sexual activity: Not on file     Review of Systems   Constitutional:  Negative for chills and fever.   HENT:  Negative for nosebleeds and tinnitus.    Eyes:  Negative for photophobia and visual disturbance.   Respiratory:  Negative for shortness of breath and wheezing.    Cardiovascular:  Negative for chest pain, palpitations and leg swelling.   Gastrointestinal:  Negative for abdominal distention, nausea and vomiting.   Genitourinary:  Negative for dysuria, flank pain and hematuria.   Musculoskeletal:  Negative for gait problem and joint swelling.   Skin:  Positive for color change and wound. Negative for rash.   Neurological:  Negative for seizures and syncope.   Objective:     Vital Signs (Most Recent):  Temp: 98.2 °F (36.8 °C) (07/21/23 0714)  Pulse: 94 (07/21/23 0925)  Resp: 18 (07/21/23 0714)  BP: 135/76 (07/21/23 0925)  SpO2: 96 % (07/21/23 0714) Vital Signs (24h Range):  Temp:  [97.7 °F (36.5 °C)-99.4 °F (37.4 °C)] 98.2 °F (36.8 °C)  Pulse:  [86-98] 94  Resp:  [16-20] 18  SpO2:  [96 %-100 %] 96 %  BP: (127-154)/(60-88) 135/76     Weight: 52.5 kg (115 lb 11.9 oz)  Body mass index is 19.87 kg/m².     Physical Exam  Vitals and nursing note reviewed.   Constitutional:       General: She is not in acute distress.     Appearance: She is well-developed. She is not diaphoretic.   HENT:      Head: Normocephalic and atraumatic.      Right Ear: External ear normal.      Left Ear: External ear normal.   Eyes:      General:          Right eye: No discharge.         Left eye: No discharge.      Conjunctiva/sclera: Conjunctivae normal.   Neck:      Thyroid: No thyromegaly.   Cardiovascular:      Rate and Rhythm: Normal rate and regular rhythm.      Heart sounds: No murmur heard.  Pulmonary:      Effort: Pulmonary effort is normal. No respiratory distress.      Breath sounds: Normal breath sounds.   Abdominal:      General: Bowel sounds are normal. There is no distension.      Palpations: Abdomen is soft. There is no mass.      Tenderness: There is no abdominal tenderness.   Musculoskeletal:         General: No deformity.      Cervical back: Normal range of motion and neck supple.      Right lower leg: No edema.      Left lower leg: No edema.      Comments: Graft left arm with palpable thrill   Skin:     General: Skin is warm and dry.      Comments: Ulcer to middle toe of left foot, palpable dorsalis pedis pulse   Neurological:      Mental Status: She is alert and oriented to person, place, and time.      Sensory: No sensory deficit.   Psychiatric:         Mood and Affect: Mood normal.         Behavior: Behavior normal.              Significant Labs: CBC:   Recent Labs   Lab 07/19/23  1546 07/20/23  0822 07/21/23  0747   WBC 6.36 8.14 8.54   HGB 6.2* 7.8* 9.6*   HCT 19.8* 24.3* 30.6*    182 212       CMP:   Recent Labs   Lab 07/19/23  1546 07/20/23  0822    143   K 4.3 4.8    106   CO2 23 23   GLU 93 87   BUN 65* 76*   CREATININE 4.8* 5.5*   CALCIUM 8.8 9.3   PROT 5.9* 6.6   ALBUMIN 2.4* 2.7*   BILITOT 0.7 1.1*   ALKPHOS 315* 397*   AST 65* 75*   ALT 40 47*   ANIONGAP 13 14       Lactic Acid:   Recent Labs   Lab 07/19/23  1546 07/19/23  1832   LACTATE 1.6 2.0         Significant Imaging:   Imaging Results               US Lower Extremity Arteries Bilateral (Final result)  Result time 07/19/23 19:46:45   Procedure changed from US Lower Extrem Arteries Bilat with NUVIA (xpd)     Final result by Jael Bowers MD (07/19/23  19:46:45)                   Narrative:    EXAMINATION:  US LOWER EXTREMITY ARTERIES BILATERAL    CLINICAL HISTORY:  foot wound;    TECHNIQUE:  Bilateral spectral, color and grayscale images of the large arteries of both lower extremities were performed.    COMPARISON:  None    FINDINGS:  Peak systolic velocities involving the right lower extremity arteries (cm/sec):    CFA: 43, triphasic    DFA: 28, biphasic    Proximal SFA: 44, triphasic    Mid SFA: 62, triphasic    Distal SFA: 62, monophasic    Proximal pop: 48, monophasic    Distal pop: 47, monophasic    NATASHA: 79, monophasic    PTA: 109, monophasic    Peroneal: 51, monophasic    DPA: 81, monophasic    Peak systolic velocities involving the left lower extremity arteries (cm/sec):    CFA: 37, biphasic    DFA: 37, triphasic    Proximal SFA: 46, triphasic    Mid SFA: 61, triphasic    Distal SFA: 53, monophasic    Proximal pop: 47, monophasic    Distal pop: 50, monophasic    NATASHA: 86, monophasic    PTA: 27, monophasic    Peroneal: 54, monophasic    DPA: 108, monophasic    IMPRESSION  Doubling of velocity between at the right proximal SFA and the right PTA.  Findings suggest greater than 50% stenosis.    Monophasic flow from the right distal SFA through the right DPA suggesting severe peripheral vascular disease.    Doubling of velocity between the left popliteal artery and the left dorsal pedis artery.  Findings suggest greater than 50% stenosis.    Monophasic flow from the left distal SFA through the right DPA suggesting severe peripheral vascular disease.    Slower than expected velocities in bilateral common femoral and bilateral proximal superficial femoral arteries.    This report was flagged in Epic as abnormal.      Electronically signed by: Jael Bowers  Date:    07/19/2023  Time:    19:46                                     X-Ray Chest 1 View (Final result)  Result time 07/19/23 18:25:09      Final result by Gustavo Ochoa MD (07/19/23 18:25:09)                    Impression:      Increased globular enlargement of the cardiac silhouette.  Differential includes cardiomegaly, pericardial effusion or both.    Increased pulmonary vasculature.  Correlate for pulmonary venous hypertension or CHF.      Electronically signed by: Gustavo Ochoa  Date:    07/19/2023  Time:    18:25               Narrative:    EXAMINATION:  XR CHEST 1 VIEW    CLINICAL HISTORY:  Anemia, unspecified    TECHNIQUE:  Single frontal view of the chest was performed.    COMPARISON:  12/18/2022    FINDINGS:  Cardiac silhouette is increased in size with a globular configuration.  AICD mild anteriorly is again noted.  The pulmonary vasculature has increased in size with reticular opacities bilaterally but no consolidation or discrete effusion.  Vascular stent in the axillary and brachial vessel on the left noted.  Bones intact.                                       X-Ray Foot Complete Left (Final result)  Result time 07/19/23 15:37:29      Final result by Asad Martin MD (07/19/23 15:37:29)                   Impression:      As above.      Electronically signed by: Asad Martin  Date:    07/19/2023  Time:    15:37               Narrative:    EXAMINATION:  XR FOOT COMPLETE 3 VIEW LEFT    CLINICAL HISTORY:  .  Pain, unspecified    TECHNIQUE:  AP, lateral and oblique views of the left foot were performed.    COMPARISON:  Radiographs 02/14/2023    FINDINGS:  Diffuse osteopenia.  No acute fracture or dislocation.  Joint spaces are preserved.  Achilles enthesopathy and small plantar calcaneal spur.  There is soft tissue swelling in the dorsal ankle and forefoot, which is nonspecific.  Extensive vascular calcifications.                                          Assessment/Plan:      * Toe ulcer  Presents with ulcer to left toe after mechanical injury 1 month ago. Afebrile without leukocytosis. X-ray without fracture or dislocation  Podiatry consulted  Continue rocephin/vanc  Will check ABIs  Blood cultures  pending,  podiatry is consulted  and no plan for intervention until PAD is addressed by vascular surgery,US arterial show severe  PAD,.plan for angiography next week       Critical limb ischemia of left lower extremity with gangrene    On arterial US,  Vascular want do leg angiography next week.     Hypothyroidism  Lab Results   Component Value Date    TSH 15.273 (H) 07/10/2023     Resume home synthroid has not been taking outpatient    Aortic atherosclerosis  On medical therapy.      PAF (paroxysmal atrial fibrillation)  Patient with Paroxysmal (<7 days) atrial fibrillation which is controlled currently with Beta Blocker.  Anticoagulation not indicated due to home eliquis held given anemia and Heme-positive stool in ED.    ICD (implantable cardioverter-defibrillator) in place - SubQ  Stable denies discharge    Anemia in ESRD (end-stage renal disease)  Presents with hemoglobin of 6.2. previously 8 to 9 2 weeks ago. Denies active bleeding. Heme-positive in ED.   Type and screen and transfuse 1 unit  Will hold home eliquis as heme positive   GI consulted  Repeat CBC  HH is improved.    Chronic combined systolic and diastolic congestive heart failure  Patient is identified as having Combined Systolic and Diastolic heart failure that is Chronic. CHF is currently controlled. Latest ECHO performed and demonstrates- Results for orders placed during the hospital encounter of 05/08/23    Echo Saline Bubble? No    Interpretation Summary  · The left ventricle is normal in size with mild eccentric hypertrophy and severely decreased systolic function.  · The estimated ejection fraction is 23%.  · There is severe left ventricular global hypokinesis.  · Grade II left ventricular diastolic dysfunction.  · Moderate left atrial enlargement.  · Normal right ventricular size with normal right ventricular systolic function.  · Moderate right atrial enlargement.  · There is mild aortic valve stenosis.  · Aortic valve area is 1.58 cm2;  peak velocity is 2.01 m/s; mean gradient is 8 mmHg.  · Moderate mitral regurgitation.  · Moderate to severe tricuspid regurgitation.  · Mild pulmonic regurgitation.  · Elevated central venous pressure (15 mmHg).  · The estimated PA systolic pressure is 47 mmHg.  · There is mild-moderate pulmonary hypertension.  · Moderate posterolateral pericardial effusion. Trivial under the RA  . Continue Beta Blocker and ARNI and monitor clinical status closely. Monitor on telemetry. Patient is off CHF pathway.  Monitor strict Is&Os and daily weights.  Place on fluid restriction of 1 L. Cardiology has not been any consulted. Continue to stress to patient importance of self efficacy and  on diet for CHF. Last BNP reviewed- and noted below No results for input(s): BNP, BNPTRIAGEBLO in the last 168 hours..    ESRD (end stage renal disease) on dialysis  Nephrology consulted for HD,  AVG malfunction,vascular planing doing, Plan for Left arm exposed AVG excision with possible ligation vs revision with new AVG placement.      Pulmonary hypertension  Fluid removal with HD.      History of kidney cancer  Followed by oncology outpatient. Home cabometyx held for last week due to diarrhea. C diff negative 7/2023 though previously positive. Continue outpatient oncology follow up    Essential hypertension  Hypotensive on arrival. Home hydralazine, held. Continue home metoprolol with hold parameters for HR and BP. will hold resuming clonidine patch      VTE Risk Mitigation (From admission, onward)         Ordered     IP VTE HIGH RISK PATIENT  Once         07/19/23 1747     Place sequential compression device  Until discontinued         07/19/23 1747     Place PING hose  Until discontinued         07/19/23 1747                Discharge Planning   MAVIS:      Code Status: Full Code   Is the patient medically ready for discharge?:     Reason for patient still in hospital (select all that apply): Patient trending condition  Discharge Plan A:  Home with family                  José Miguel Cristina MD  Department of Hospital Medicine   [facility]

## 2023-07-21 NOTE — TRANSFER OF CARE
"Anesthesia Transfer of Care Note    Patient: Eva Bloom    Procedure(s) Performed: Procedure(s) (LRB):  LEFT ARM ARTERIOVENOUS GRAFT EXCISION  AND LIGATION (Left)    Patient location: PACU    Anesthesia Type: MAC    Transport from OR: Transported from OR on room air with adequate spontaneous ventilation    Post pain: adequate analgesia    Post assessment: no apparent anesthetic complications    Post vital signs: stable    Level of consciousness: awake    Nausea/Vomiting: no nausea/vomiting    Complications: none    Transfer of care protocol was followed      Last vitals:   Visit Vitals  BP (!) 177/98 (BP Location: Right arm, Patient Position: Lying)   Pulse 88   Temp 36.7 °C (98.1 °F) (Skin)   Resp 20   Ht 5' 4" (1.626 m)   Wt 52.5 kg (115 lb 11.9 oz)   LMP 10/24/2016   SpO2 96%   Breastfeeding No   BMI 19.87 kg/m²     "

## 2023-07-21 NOTE — SUBJECTIVE & OBJECTIVE
Past Medical History:   Diagnosis Date    Anemia     Atrial fibrillation     Bronchitis 03/01/2017    Cancer 2016    kidney cancer    CHF (congestive heart failure), NYHA class II, chronic, systolic     CMV (cytomegalovirus) antibody positive     Encounter for blood transfusion     ESRD (end stage renal disease)     Essential hypertension 09/23/2015    H/O herpes simplex type 2 infection     Herpes simplex type 1 antibody positive     History of kidney cancer     s/p left nephrectomy 1/2016    Hyperparathyroidism, unspecified     Hyperuricemia without signs of inflammatory arthritis and tophaceous disease     Kidney stones     LGSIL (low grade squamous intraepithelial dysplasia)     Myocardiopathy 07/21/2017    Prediabetes     Proteinuria     Renal disorder     Thyroid nodule     Urate nephropathy        Past Surgical History:   Procedure Laterality Date    BREAST CYST EXCISION      COLONOSCOPY N/A 11/12/2015    COLONOSCOPY N/A 3/12/2021    Procedure: COLONOSCOPY;  Surgeon: Brendon Lanier MD;  Location: [facility] ENDO;  Service: Endoscopy;  Laterality: N/A;  covid test 3/9, labs prior, prep instr mailed -ml    INSERTION OF BIVENTRICULAR IMPLANTABLE CARDIOVERTER-DEFIBRILLATOR (ICD)  04/2021    INSERTION OF TUNNELED CENTRAL VENOUS CATHETER (CVC) WITH SUBCUTANEOUS PORT N/A 1/25/2023    Procedure: INSERTION, DUAL LUMEN CATHETER WITH PORT, WITH IMAGING GUIDANCE;  Surgeon: FARIDEH Muñoz III, MD;  Location: [facility] OR [address] [place] [address];  Service: Peripheral Vascular;  Laterality: N/A;  possinble permcath placment    NEPHRECTOMY-LAPAROSCOPIC Left 01/12/2016    PERCUTANEOUS TRANSLUMINAL ANGIOPLASTY OF ARTERIOVENOUS FISTULA Left 4/19/2023    Procedure: PTA, AV FISTULA;  Surgeon: FARIDEH Muñoz III, MD;  Location: [facility] CATH LAB;  Service: Peripheral Vascular;  Laterality: Left;    PERITONEAL CATHETER INSERTION      Permacath insertion  01/12/2017    PLACEMENT OF ARTERIOVENOUS GRAFT  12/28/2022    Procedure: INSERTION, GRAFT,  ARTERIOVENOUS;  Surgeon: FARIDEH Muñoz III, MD;  Location: [facility] OR 2ND FLR;  Service: Peripheral Vascular;;    REMOVAL, GRAFT, ARTERIOVENOUS, UPPER EXTREMITY Left 1/25/2023    Procedure: REMOVAL, GRAFT, ARTERIOVENOUS, UPPER EXTREMITY;  Surgeon: FARIDEH Muñoz III, MD;  Location: [facility] OR 2ND FLR;  Service: Peripheral Vascular;  Laterality: Left;    REVISION OF ARTERIOVENOUS FISTULA Left 5/1/2023    Procedure: REVISION, AV FISTULA;  Surgeon: FARIDEH Muñoz III, MD;  Location: [facility] OR 2ND FLR;  Service: Peripheral Vascular;  Laterality: Left;  LUE AVG revision    REVISION OF PROCEDURE INVOLVING ARTERIOVENOUS GRAFT Left 12/28/2022    Procedure: REVISION, PROCEDURE INVOLVING ARTERIOVENOUS GRAFT;  Surgeon: FARIDEH Muñoz III, MD;  Location: [facility] OR 2ND FLR;  Service: Peripheral Vascular;  Laterality: Left;    SALPINGOOPHORECTOMY Right 2016    KJB---DAVINCI    TONSILLECTOMY      TUBAL LIGATION         Review of patient's allergies indicates:   Allergen Reactions    Coreg [carvedilol] Other (See Comments)     Nausea/vomiting    Allopurinol      Other reaction(s): abnormal transaminases       Current Facility-Administered Medications on File Prior to Encounter   Medication    0.9%  NaCl infusion     Current Outpatient Medications on File Prior to Encounter   Medication Sig    apixaban (ELIQUIS) 2.5 mg Tab Take 1 tablet (2.5 mg total) by mouth 2 (two) times daily.    cabozantinib (CABOMETYX) 60 mg Tab TAKE ONE TABLET BY MOUTH ONCE DAILY AT THE SAME TIME ON AN EMPTY STOMACH AT LEAST 1 HOUR BEFORE OR 2 HOURS AFTER EATING. AVOID GRAPEFRUIT PRODUCTS    cloNIDine 0.3 mg/24 hr td ptwk (CATAPRES) 0.3 mg/24 hr Place 1 patch onto the skin every 7 days.    cyclobenzaprine (FLEXERIL) 5 MG tablet Take 1 tablet (5 mg total) by mouth daily as needed for Muscle spasms.    gabapentin (NEURONTIN) 100 MG capsule Take 1 capsule (100 mg total) by mouth once daily.    hydrALAZINE (APRESOLINE) 100 MG tablet Take 1 tablet (100 mg total) by  mouth every 12 (twelve) hours.    HYDROcodone-acetaminophen (NORCO) 5-325 mg per tablet Take 1 tablet by mouth every 6 (six) hours as needed for Pain.    levothyroxine (SYNTHROID) 75 MCG tablet Take 1 tablet (75 mcg total) by mouth once daily.    lidocaine-prilocaine (EMLA) cream APPLY ATLEAST 30 MINUTES BEFORE TREATMENT 3 TIMES A WEEK    metoprolol succinate (TOPROL-XL) 100 MG 24 hr tablet Take 1/2 tablet (50mg) once daily    mv,Ca,min-folic acid-vit K1 (ONE-A-DAY WOMEN'S 50 PLUS) 400-20 mcg Tab Take 1 tablet by mouth once daily.    polyethylene glycol (GLYCOLAX) 17 gram/dose powder Take 17 g by mouth once daily.    promethazine (PHENERGAN) 25 MG tablet Take 1 tablet (25 mg total) by mouth every 6 (six) hours as needed for Nausea.    acetaminophen (TYLENOL) 500 MG tablet Take 500 mg by mouth every 6 (six) hours as needed for Pain.    naloxone (NARCAN) 1 mg/mL injection 2 mg (1 mg per nostril) by Nasal route as needed for opioid overdose; may repeat in 3 to 5 minutes if not effective. Call 911    [DISCONTINUED] amLODIPine (NORVASC) 5 MG tablet TAKE 1 TABLET BY MOUTH EVERY DAY     Family History       Problem Relation (Age of Onset)    Cataracts Maternal Aunt    Diabetes Father, Maternal Grandfather    Heart disease Sister    Hypertension Mother    Kidney disease Father    No Known Problems Sister, Sister, Brother, Brother, Maternal Uncle, Paternal Aunt, Paternal Uncle, Maternal Grandmother, Paternal Grandmother, Paternal Grandfather, Son, Son, Other          Tobacco Use    Smoking status: Never    Smokeless tobacco: Never   Substance and Sexual Activity    Alcohol use: No     Comment: . 2 children. works at [employer].    Drug use: Never    Sexual activity: Not on file     Review of Systems   Constitutional:  Negative for chills and fever.   HENT:  Negative for nosebleeds and tinnitus.    Eyes:  Negative for photophobia and visual disturbance.   Respiratory:  Negative for shortness of breath and wheezing.     Cardiovascular:  Negative for chest pain, palpitations and leg swelling.   Gastrointestinal:  Negative for abdominal distention, nausea and vomiting.   Genitourinary:  Negative for dysuria, flank pain and hematuria.   Musculoskeletal:  Negative for gait problem and joint swelling.   Skin:  Positive for color change and wound. Negative for rash.   Neurological:  Negative for seizures and syncope.   Objective:     Vital Signs (Most Recent):  Temp: 98.2 °F (36.8 °C) (07/21/23 0714)  Pulse: 94 (07/21/23 0925)  Resp: 18 (07/21/23 0714)  BP: 135/76 (07/21/23 0925)  SpO2: 96 % (07/21/23 0714) Vital Signs (24h Range):  Temp:  [97.7 °F (36.5 °C)-99.4 °F (37.4 °C)] 98.2 °F (36.8 °C)  Pulse:  [86-98] 94  Resp:  [16-20] 18  SpO2:  [96 %-100 %] 96 %  BP: (127-154)/(60-88) 135/76     Weight: 52.5 kg (115 lb 11.9 oz)  Body mass index is 19.87 kg/m².     Physical Exam  Vitals and nursing note reviewed.   Constitutional:       General: She is not in acute distress.     Appearance: She is well-developed. She is not diaphoretic.   HENT:      Head: Normocephalic and atraumatic.      Right Ear: External ear normal.      Left Ear: External ear normal.   Eyes:      General:         Right eye: No discharge.         Left eye: No discharge.      Conjunctiva/sclera: Conjunctivae normal.   Neck:      Thyroid: No thyromegaly.   Cardiovascular:      Rate and Rhythm: Normal rate and regular rhythm.      Heart sounds: No murmur heard.  Pulmonary:      Effort: Pulmonary effort is normal. No respiratory distress.      Breath sounds: Normal breath sounds.   Abdominal:      General: Bowel sounds are normal. There is no distension.      Palpations: Abdomen is soft. There is no mass.      Tenderness: There is no abdominal tenderness.   Musculoskeletal:         General: No deformity.      Cervical back: Normal range of motion and neck supple.      Right lower leg: No edema.      Left lower leg: No edema.      Comments: Graft left arm with palpable  thrill   Skin:     General: Skin is warm and dry.      Comments: Ulcer to middle toe of left foot, palpable dorsalis pedis pulse   Neurological:      Mental Status: She is alert and oriented to person, place, and time.      Sensory: No sensory deficit.   Psychiatric:         Mood and Affect: Mood normal.         Behavior: Behavior normal.              Significant Labs: CBC:   Recent Labs   Lab 07/19/23  1546 07/20/23  0822 07/21/23  0747   WBC 6.36 8.14 8.54   HGB 6.2* 7.8* 9.6*   HCT 19.8* 24.3* 30.6*    182 212       CMP:   Recent Labs   Lab 07/19/23  1546 07/20/23  0822    143   K 4.3 4.8    106   CO2 23 23   GLU 93 87   BUN 65* 76*   CREATININE 4.8* 5.5*   CALCIUM 8.8 9.3   PROT 5.9* 6.6   ALBUMIN 2.4* 2.7*   BILITOT 0.7 1.1*   ALKPHOS 315* 397*   AST 65* 75*   ALT 40 47*   ANIONGAP 13 14       Lactic Acid:   Recent Labs   Lab 07/19/23  1546 07/19/23  1832   LACTATE 1.6 2.0         Significant Imaging:   Imaging Results               US Lower Extremity Arteries Bilateral (Final result)  Result time 07/19/23 19:46:45   Procedure changed from US Lower Extrem Arteries Bilat with NUVIA (xpd)     Final result by Jael Bowers MD (07/19/23 19:46:45)                   Narrative:    EXAMINATION:  US LOWER EXTREMITY ARTERIES BILATERAL    CLINICAL HISTORY:  foot wound;    TECHNIQUE:  Bilateral spectral, color and grayscale images of the large arteries of both lower extremities were performed.    COMPARISON:  None    FINDINGS:  Peak systolic velocities involving the right lower extremity arteries (cm/sec):    CFA: 43, triphasic    DFA: 28, biphasic    Proximal SFA: 44, triphasic    Mid SFA: 62, triphasic    Distal SFA: 62, monophasic    Proximal pop: 48, monophasic    Distal pop: 47, monophasic    NATASHA: 79, monophasic    PTA: 109, monophasic    Peroneal: 51, monophasic    DPA: 81, monophasic    Peak systolic velocities involving the left lower extremity arteries (cm/sec):    CFA: 37, biphasic    DFA:  37, triphasic    Proximal SFA: 46, triphasic    Mid SFA: 61, triphasic    Distal SFA: 53, monophasic    Proximal pop: 47, monophasic    Distal pop: 50, monophasic    NATASHA: 86, monophasic    PTA: 27, monophasic    Peroneal: 54, monophasic    DPA: 108, monophasic    IMPRESSION  Doubling of velocity between at the right proximal SFA and the right PTA.  Findings suggest greater than 50% stenosis.    Monophasic flow from the right distal SFA through the right DPA suggesting severe peripheral vascular disease.    Doubling of velocity between the left popliteal artery and the left dorsal pedis artery.  Findings suggest greater than 50% stenosis.    Monophasic flow from the left distal SFA through the right DPA suggesting severe peripheral vascular disease.    Slower than expected velocities in bilateral common femoral and bilateral proximal superficial femoral arteries.    This report was flagged in Epic as abnormal.      Electronically signed by: Jael Bowers  Date:    07/19/2023  Time:    19:46                                     X-Ray Chest 1 View (Final result)  Result time 07/19/23 18:25:09      Final result by Gustavo Ochoa MD (07/19/23 18:25:09)                   Impression:      Increased globular enlargement of the cardiac silhouette.  Differential includes cardiomegaly, pericardial effusion or both.    Increased pulmonary vasculature.  Correlate for pulmonary venous hypertension or CHF.      Electronically signed by: Gustavo Ochoa  Date:    07/19/2023  Time:    18:25               Narrative:    EXAMINATION:  XR CHEST 1 VIEW    CLINICAL HISTORY:  Anemia, unspecified    TECHNIQUE:  Single frontal view of the chest was performed.    COMPARISON:  12/18/2022    FINDINGS:  Cardiac silhouette is increased in size with a globular configuration.  AICD mild anteriorly is again noted.  The pulmonary vasculature has increased in size with reticular opacities bilaterally but no consolidation or discrete effusion.   Vascular stent in the axillary and brachial vessel on the left noted.  Bones intact.                                       X-Ray Foot Complete Left (Final result)  Result time 07/19/23 15:37:29      Final result by Asad Martin MD (07/19/23 15:37:29)                   Impression:      As above.      Electronically signed by: Asad Martin  Date:    07/19/2023  Time:    15:37               Narrative:    EXAMINATION:  XR FOOT COMPLETE 3 VIEW LEFT    CLINICAL HISTORY:  .  Pain, unspecified    TECHNIQUE:  AP, lateral and oblique views of the left foot were performed.    COMPARISON:  Radiographs 02/14/2023    FINDINGS:  Diffuse osteopenia.  No acute fracture or dislocation.  Joint spaces are preserved.  Achilles enthesopathy and small plantar calcaneal spur.  There is soft tissue swelling in the dorsal ankle and forefoot, which is nonspecific.  Extensive vascular calcifications.

## 2023-07-21 NOTE — PROGRESS NOTES
[facility]  Podiatry  Consult Note    Patient Name: Eva Bloom  MRN: [id number]  Admission Date: 7/19/2023  Hospital Length of Stay: 1 days  Attending Physician: José Miguel Cristina MD  Primary Care Provider: Sowmya Mccain MD     Consults  Subjective:     History of Present Illness: Eva Bloom is a 57 y.o. female with  has a past medical history of Anemia, Atrial fibrillation, Bronchitis, Cancer, CHF (congestive heart failure), NYHA class II, chronic, systolic, CMV (cytomegalovirus) antibody positive, Encounter for blood transfusion, ESRD (end stage renal disease), Essential hypertension, H/O herpes simplex type 2 infection, Herpes simplex type 1 antibody positive, History of kidney cancer, Hyperparathyroidism, unspecified, Hyperuricemia without signs of inflammatory arthritis and tophaceous disease, Kidney stones, LGSIL (low grade squamous intraepithelial dysplasia), Myocardiopathy, Prediabetes, Proteinuria, Renal disorder, Thyroid nodule, and Urate nephropathy.  Consult to Podiatry for evaluation and treatment of left toe wound.  Patient relates that while barefoot a chair rolled over her left foot several weeks ago.  She attempted self-care of the subsequent injury with icing and topical triple antibiotic ointment.  She states that despite home care, the wound began to change in color and increase in pain with palpation and ambulation.  She denies seeing purulence and has not seen blood in several days.     7/21/23: Patient seen bedside. Pending AVG revision today. Patient also reports h/o fall weeks ago injuring right great toe.    Chief Complaint   Patient presents with    Toe Injury     Pt reports a chair rolled over her left foot x2 weeks ago., Wound on top of her 3rd toe will not heal  Pt sent by home health nurse to have the wound check out.  Pt denies fever or chills.  Pt is a dialysis pt.       Scheduled Meds:   cefTRIAXone (ROCEPHIN) IVPB  1 g Intravenous Q24H    hydrocortisone    Rectal BID    levothyroxine  75 mcg Oral Daily    metoprolol succinate  50 mg Oral Daily    mupirocin   Nasal BID     Continuous Infusions:  PRN Meds:sodium chloride, sodium chloride, sodium chloride 0.9%, acetaminophen, dextrose 50%, dextrose 50%, glucagon (human recombinant), glucose, glucose, HYDROcodone-acetaminophen, magnesium oxide, magnesium oxide, melatonin, naloxone, senna-docusate 8.6-50 mg, sodium chloride 0.9%, sodium chloride 0.9%, Pharmacy to dose Vancomycin consult **AND** vancomycin - pharmacy to dose    Review of patient's allergies indicates:   Allergen Reactions    Coreg [carvedilol] Other (See Comments)     Nausea/vomiting    Allopurinol      Other reaction(s): abnormal transaminases        Past Medical History:   Diagnosis Date    Anemia     Atrial fibrillation     Bronchitis 03/01/2017    Cancer 2016    kidney cancer    CHF (congestive heart failure), NYHA class II, chronic, systolic     CMV (cytomegalovirus) antibody positive     Encounter for blood transfusion     ESRD (end stage renal disease)     Essential hypertension 09/23/2015    H/O herpes simplex type 2 infection     Herpes simplex type 1 antibody positive     History of kidney cancer     s/p left nephrectomy 1/2016    Hyperparathyroidism, unspecified     Hyperuricemia without signs of inflammatory arthritis and tophaceous disease     Kidney stones     LGSIL (low grade squamous intraepithelial dysplasia)     Myocardiopathy 07/21/2017    Prediabetes     Proteinuria     Renal disorder     Thyroid nodule     Urate nephropathy      Past Surgical History:   Procedure Laterality Date    BREAST CYST EXCISION      COLONOSCOPY N/A 11/12/2015    COLONOSCOPY N/A 3/12/2021    Procedure: COLONOSCOPY;  Surgeon: Brendon Lanier MD;  Location: [facility];  Service: Endoscopy;  Laterality: N/A;  covid test 3/9, labs prior, prep instr mailed -ml    INSERTION OF BIVENTRICULAR IMPLANTABLE CARDIOVERTER-DEFIBRILLATOR (ICD)  04/2021    INSERTION OF  TUNNELED CENTRAL VENOUS CATHETER (CVC) WITH SUBCUTANEOUS PORT N/A 1/25/2023    Procedure: INSERTION, DUAL LUMEN CATHETER WITH PORT, WITH IMAGING GUIDANCE;  Surgeon: FARIDEH Muñoz III, MD;  Location: [facility] OR [facility] FLR;  Service: Peripheral Vascular;  Laterality: N/A;  possinble permcath placment    NEPHRECTOMY-LAPAROSCOPIC Left 01/12/2016    PERCUTANEOUS TRANSLUMINAL ANGIOPLASTY OF ARTERIOVENOUS FISTULA Left 4/19/2023    Procedure: PTA, AV FISTULA;  Surgeon: FARIDEH Muñoz III, MD;  Location: [facility] CATH LAB;  Service: Peripheral Vascular;  Laterality: Left;    PERITONEAL CATHETER INSERTION      Permacath insertion  01/12/2017    PLACEMENT OF ARTERIOVENOUS GRAFT  12/28/2022    Procedure: INSERTION, GRAFT, ARTERIOVENOUS;  Surgeon: FARIDEH Muñoz III, MD;  Location: [facility] OR [facility] FLR;  Service: Peripheral Vascular;;    REMOVAL, GRAFT, ARTERIOVENOUS, UPPER EXTREMITY Left 1/25/2023    Procedure: REMOVAL, GRAFT, ARTERIOVENOUS, UPPER EXTREMITY;  Surgeon: FARIDEH Muñoz III, MD;  Location: [facility] OR 2ND FLR;  Service: Peripheral Vascular;  Laterality: Left;    REVISION OF ARTERIOVENOUS FISTULA Left 5/1/2023    Procedure: REVISION, AV FISTULA;  Surgeon: FARIDEH Muñoz III, MD;  Location: [facility] OR 2ND FLR;  Service: Peripheral Vascular;  Laterality: Left;  LUE AVG revision    REVISION OF PROCEDURE INVOLVING ARTERIOVENOUS GRAFT Left 12/28/2022    Procedure: REVISION, PROCEDURE INVOLVING ARTERIOVENOUS GRAFT;  Surgeon: FARIDEH Muñoz III, MD;  Location: [facility] OR 2ND FLR;  Service: Peripheral Vascular;  Laterality: Left;    SALPINGOOPHORECTOMY Right 2016    KJB---DAVINCI    TONSILLECTOMY      TUBAL LIGATION         Family History       Problem Relation (Age of Onset)    Cataracts Maternal Aunt    Diabetes Father, Maternal Grandfather    Heart disease Sister    Hypertension Mother    Kidney disease Father    No Known Problems Sister, Sister, Brother, Brother, Maternal Uncle, Paternal Aunt, Paternal Uncle, Maternal Grandmother,  Paternal Grandmother, Paternal Grandfather, Son, Son, Other          Tobacco Use    Smoking status: Never    Smokeless tobacco: Never   Substance and Sexual Activity    Alcohol use: No     Comment: . 2 children. works at [employer].    Drug use: Never    Sexual activity: Not on file     Review of Systems   Constitutional:  Positive for activity change and fatigue. Negative for appetite change, chills and fever.   Respiratory:  Negative for cough and shortness of breath.    Cardiovascular:  Positive for leg swelling. Negative for chest pain.   Gastrointestinal:  Negative for diarrhea, nausea and vomiting.   Musculoskeletal:  Positive for arthralgias.   Skin:  Positive for color change and wound.   Neurological:  Negative for weakness.        + paresthesia    Objective:     Vital Signs (Most Recent):  Temp: 98.2 °F (36.8 °C) (07/21/23 0714)  Pulse: 94 (07/21/23 0925)  Resp: 18 (07/21/23 0714)  BP: 135/76 (07/21/23 0925)  SpO2: 96 % (07/21/23 0714) Vital Signs (24h Range):  Temp:  [97.8 °F (36.6 °C)-99.4 °F (37.4 °C)] 98.2 °F (36.8 °C)  Pulse:  [91-98] 94  Resp:  [18-20] 18  SpO2:  [96 %-100 %] 96 %  BP: (127-154)/(60-88) 135/76     Weight: 52.5 kg (115 lb 11.9 oz)  Body mass index is 19.87 kg/m².    General: Pt. is thin, appears stated age, in no acute distress, alert and oriented x 3. Calm, cooperative, and communicative. Appropriate interactions and affect.    Lower Extremity Examination    Vascular: Dorsalis pedis and posterior tibial pulses are diminished Bilaterally. Toes are cool to touch. Feet are warm proximally.There is decreased digital hair. Skin is atrophic, edematous to forefoot danny    Neurologic: Light touch present. proprioception intact    Musculoskeletal: Muscle strength is 5/5 in all groups bilaterally.    Lymphatics: no lymphangitic streaking bilaterally.    Dermatologic: Skin is atrophic    7/21/23:  Left third toe stable.     Right hallux nail bed ecchymosis.         Ulcer location: left  dorsomedial 3rd digit PIPJ  Signs of infection: local edema and erythema  Drainage: Ulcer dry  Purulence: No  Crepitus/fluctuance: No  Periwound: Reddened  Base: Necrotic eschar  Depth:  unstageable  Probe to bone: can palpates bone and joint beneath eschar            Laboratory:  A1C: No results for input(s): HGBA1C in the last 4320 hours.  CBC:   Recent Labs   Lab 07/21/23  0747   WBC 8.54   RBC 3.00*   HGB 9.6*   HCT 30.6*      *   MCH 32.0*   MCHC 31.4*       CMP:   Recent Labs   Lab 07/20/23  0822 07/21/23  0747   GLU 87  --    CALCIUM 9.3  --    ALBUMIN 2.7*  --    PROT 6.6  --      --    K 4.8 4.2   CO2 23  --      --    BUN 76*  --    CREATININE 5.5*  --    ALKPHOS 397*  --    ALT 47*  --    AST 75*  --    BILITOT 1.1*  --        CRP: No results for input(s): CRP in the last 168 hours.  ESR: No results for input(s): SEDRATE in the last 168 hours.  Microbiology Results (last 7 days)       Procedure Component Value Units Date/Time    Culture, Anaerobe [[id number]]     Order Status: No result Specimen: Incision site from Arm, Left     Aerobic culture [[id number]]     Order Status: No result Specimen: Incision site from Arm, Left     Fungus culture [[id number]]     Order Status: No result Specimen: Incision site from Arm, Left     Gram stain [[id number]]     Order Status: No result Specimen: Incision site from Arm, Left     AFB Culture & Smear [[id number]]     Order Status: No result Specimen: Incision site from Arm, Left     Aerobic culture (Specify Source) **CANNOT BE ORDERED AS STAT** [[id number]]  (Abnormal) Collected: 07/20/23 0528    Order Status: Completed Specimen: Wound from Toe, Left Foot Updated: 07/21/23 1038     Aerobic Bacterial Culture COAGULASE-NEGATIVE STAPHYLOCOCCUS SPECIES  Many  Identification and susceptibility pending      Narrative:      Left middle toe    Blood culture x two cultures. Draw prior to antibiotics. [[id number]] Collected: 07/19/23 1602    Order Status:  Completed Specimen: Blood from Peripheral, Forearm, Right Updated: 07/20/23 1703     Blood Culture, Routine No Growth to date      No Growth to date    Narrative:      Aerobic and anaerobic    Blood culture x two cultures. Draw prior to antibiotics. [[id number]] Collected: 07/19/23 1547    Order Status: Completed Specimen: Blood from Peripheral, Forearm, Right Updated: 07/20/23 1703     Blood Culture, Routine No Growth to date      No Growth to date    Narrative:      Aerobic and anaerobic            Diagnostic Results:  Imaging Results               US Lower Extremity Arteries Bilateral (Final result)  Result time 07/19/23 19:46:45   Procedure changed from US Lower Extrem Arteries Bilat with NUVIA (xpd)     Final result by Jael Bowers MD (07/19/23 19:46:45)                   Narrative:    EXAMINATION:  US LOWER EXTREMITY ARTERIES BILATERAL    CLINICAL HISTORY:  foot wound;    TECHNIQUE:  Bilateral spectral, color and grayscale images of the large arteries of both lower extremities were performed.    COMPARISON:  None    FINDINGS:  Peak systolic velocities involving the right lower extremity arteries (cm/sec):    CFA: 43, triphasic    DFA: 28, biphasic    Proximal SFA: 44, triphasic    Mid SFA: 62, triphasic    Distal SFA: 62, monophasic    Proximal pop: 48, monophasic    Distal pop: 47, monophasic    NATASHA: 79, monophasic    PTA: 109, monophasic    Peroneal: 51, monophasic    DPA: 81, monophasic    Peak systolic velocities involving the left lower extremity arteries (cm/sec):    CFA: 37, biphasic    DFA: 37, triphasic    Proximal SFA: 46, triphasic    Mid SFA: 61, triphasic    Distal SFA: 53, monophasic    Proximal pop: 47, monophasic    Distal pop: 50, monophasic    NATASHA: 86, monophasic    PTA: 27, monophasic    Peroneal: 54, monophasic    DPA: 108, monophasic    IMPRESSION  Doubling of velocity between at the right proximal SFA and the right PTA.  Findings suggest greater than 50% stenosis.    Monophasic flow  from the right distal SFA through the right DPA suggesting severe peripheral vascular disease.    Doubling of velocity between the left popliteal artery and the left dorsal pedis artery.  Findings suggest greater than 50% stenosis.    Monophasic flow from the left distal SFA through the right DPA suggesting severe peripheral vascular disease.    Slower than expected velocities in bilateral common femoral and bilateral proximal superficial femoral arteries.    This report was flagged in Epic as abnormal.      Electronically signed by: Jael Bowers  Date:    07/19/2023  Time:    19:46                                     X-Ray Chest 1 View (Final result)  Result time 07/19/23 18:25:09      Final result by Gustavo Ochoa MD (07/19/23 18:25:09)                   Impression:      Increased globular enlargement of the cardiac silhouette.  Differential includes cardiomegaly, pericardial effusion or both.    Increased pulmonary vasculature.  Correlate for pulmonary venous hypertension or CHF.      Electronically signed by: Gustavo Ochoa  Date:    07/19/2023  Time:    18:25               Narrative:    EXAMINATION:  XR CHEST 1 VIEW    CLINICAL HISTORY:  Anemia, unspecified    TECHNIQUE:  Single frontal view of the chest was performed.    COMPARISON:  12/18/2022    FINDINGS:  Cardiac silhouette is increased in size with a globular configuration.  AICD mild anteriorly is again noted.  The pulmonary vasculature has increased in size with reticular opacities bilaterally but no consolidation or discrete effusion.  Vascular stent in the axillary and brachial vessel on the left noted.  Bones intact.                                       X-Ray Foot Complete Left (Final result)  Result time 07/19/23 15:37:29      Final result by Asad Martin MD (07/19/23 15:37:29)                   Impression:      As above.      Electronically signed by: Asad Martin  Date:    07/19/2023  Time:    15:37               Narrative:     EXAMINATION:  XR FOOT COMPLETE 3 VIEW LEFT    CLINICAL HISTORY:  .  Pain, unspecified    TECHNIQUE:  AP, lateral and oblique views of the left foot were performed.    COMPARISON:  Radiographs 02/14/2023    FINDINGS:  Diffuse osteopenia.  No acute fracture or dislocation.  Joint spaces are preserved.  Achilles enthesopathy and small plantar calcaneal spur.  There is soft tissue swelling in the dorsal ankle and forefoot, which is nonspecific.  Extensive vascular calcifications.                                     Assessment/Plan:     Active Diagnoses:    Diagnosis Date Noted POA    PRINCIPAL PROBLEM:  Toe ulcer [L97.509] 07/19/2023 Yes    Critical limb ischemia of left lower extremity with gangrene [I70.262] 07/20/2023 Yes    Hypothyroidism [E03.9] 07/19/2023 Yes    Aortic atherosclerosis [I70.0] 07/06/2023 Yes    PAF (paroxysmal atrial fibrillation) [I48.0] 07/25/2022 Yes    ICD (implantable cardioverter-defibrillator) in place - SubQ [Z95.810] 07/15/2021 Yes    Anemia in ESRD (end-stage renal disease) [N18.6, D63.1]  Yes    Chronic combined systolic and diastolic congestive heart failure [I50.42]  Yes    ESRD (end stage renal disease) on dialysis [N18.6, Z99.2]  Not Applicable    Pulmonary hypertension [I27.20] 07/21/2017 Yes    History of kidney cancer [Z85.528] 09/02/2016 Not Applicable    Essential hypertension [I10] 09/23/2015 Yes      Problems Resolved During this Admission:     Education about the prevention of limb loss.    Discussed wound healing cycle, skin integrity, ways to care for skin.Counseled patient on the effects of PAOD on healing. She verbalizes understanding that it can increase the chances of delayed healing and this prolonged exposure leads to infection or progression of infection which subsequently can result in loss of limb.    Imaging reviewed to rule out bone involvement or gas in the soft tissues. No gas nor bone involvement noted, however the ability to palpate bone beneath eschar raises  clinical suspicion of potential early OM    Xray right foot ordered.    Arterial studies and clinical exam consistent with PAOD, vascular surgery on board.  Will defer in podiatric surgical intervention until any vascular intervention     Nursing orders for betadine paint qshift      Thank you for your consult. I will follow-up with patient. Please contact us if you have any additional questions.    Jael Rosario DPM  Podiatry  [facility]

## 2023-07-21 NOTE — BRIEF OP NOTE
Radiology Post-Procedure Note    Pre Op Diagnosis: ESRD with malfunction of LUE AVF/AVG  Post Op Diagnosis: Same    Procedure: 1. US and fluoroscopic-guided placement of a 23-cm LIJV-approach THDC    Procedure performed by: Pan Win MD    Written Informed Consent Obtained: Yes  Specimen Removed: NO  Estimated Blood Loss: Minimal    Findings:   Successful US and fluoroscopic-guided placement of a 23-cm LIJV-approach THDC with local anesthetic and moderate conscious sedation. Patient tolerated the procedure well. No immediate post-procedural complications noted.     THDC is ready for use immediately.    Patient transferred back to floor for 2 hours post-op monitoring and bed rest with HOB elevated at least 30 degrees during bed rest.    Thank you for considering IR for the care of your patient.     Pan Win MD  Interventional Radiology

## 2023-07-21 NOTE — ANESTHESIA PREPROCEDURE EVALUATION
07/21/2023  Eva Bloom is a 57 y.o., female for AVG revision.    Past Medical History:   Diagnosis Date    Anemia     Atrial fibrillation     Bronchitis 03/01/2017    Cancer 2016    kidney cancer    CHF (congestive heart failure), NYHA class II, chronic, systolic     CMV (cytomegalovirus) antibody positive     Encounter for blood transfusion     ESRD (end stage renal disease)     Essential hypertension 09/23/2015    H/O herpes simplex type 2 infection     Herpes simplex type 1 antibody positive     History of kidney cancer     s/p left nephrectomy 1/2016    Hyperparathyroidism, unspecified     Hyperuricemia without signs of inflammatory arthritis and tophaceous disease     Kidney stones     LGSIL (low grade squamous intraepithelial dysplasia)     Myocardiopathy 07/21/2017    Prediabetes     Proteinuria     Renal disorder     Thyroid nodule     Urate nephropathy        Pre-operative evaluation for Procedure(s) (LRB):  REVISION, PROCEDURE INVOLVING ARTERIOVENOUS GRAFT (Left)    Eva Bloom is a 57 y.o. female     Patient Active Problem List   Diagnosis    Kidney stones    Hyperparathyroidism, secondary renal    Hyperuricemia without signs of inflammatory arthritis and tophaceous disease    Proteinuria    Urate nephropathy    Essential hypertension    Pap smear abnormality of cervix with ASCUS favoring benign    CMV (cytomegalovirus) antibody positive    Herpes simplex type 1 antibody positive    H/O herpes simplex type 2 infection    Multinodular goiter    History of kidney cancer    S/p nephrectomy left    Stenosis of arteriovenous dialysis fistula    Primary insomnia    Cardiomyopathy, nonischemic    Pulmonary hypertension    ESRD (end stage renal disease) on dialysis    Nonrheumatic mitral valve regurgitation    Chronic combined systolic and diastolic  congestive heart failure    Anemia in ESRD (end-stage renal disease)    Chronic kidney disease-mineral and bone disorder    Hyperphosphatemia    ICD (implantable cardioverter-defibrillator) in place - SubQ    Right-sided epistaxis    Left-sided epistaxis    Hypercalcemia    Glucose intolerance    PAF (paroxysmal atrial fibrillation)    Transaminitis    Debility    Malfunction of arteriovenous dialysis fistula    Clostridium difficile infection    Arteriovenous graft infection    Aortic atherosclerosis    Toe ulcer    Hypothyroidism    Critical limb ischemia of left lower extremity with gangrene       Review of patient's allergies indicates:   Allergen Reactions    Coreg [carvedilol] Other (See Comments)     Nausea/vomiting    Allopurinol      Other reaction(s): abnormal transaminases       Current Facility-Administered Medications on File Prior to Encounter   Medication Dose Route Frequency Provider Last Rate Last Admin    0.9%  NaCl infusion   Intravenous Continuous Soni Bhatti MD         Current Outpatient Medications on File Prior to Encounter   Medication Sig Dispense Refill    apixaban (ELIQUIS) 2.5 mg Tab Take 1 tablet (2.5 mg total) by mouth 2 (two) times daily. 60 tablet 11    cabozantinib (CABOMETYX) 60 mg Tab TAKE ONE TABLET BY MOUTH ONCE DAILY AT THE SAME TIME ON AN EMPTY STOMACH AT LEAST 1 HOUR BEFORE OR 2 HOURS AFTER EATING. AVOID GRAPEFRUIT PRODUCTS 30 tablet 4    cloNIDine 0.3 mg/24 hr td ptwk (CATAPRES) 0.3 mg/24 hr Place 1 patch onto the skin every 7 days. 4 patch 11    cyclobenzaprine (FLEXERIL) 5 MG tablet Take 1 tablet (5 mg total) by mouth daily as needed for Muscle spasms. 30 tablet 1    gabapentin (NEURONTIN) 100 MG capsule Take 1 capsule (100 mg total) by mouth once daily. 30 capsule 11    hydrALAZINE (APRESOLINE) 100 MG tablet Take 1 tablet (100 mg total) by mouth every 12 (twelve) hours. 180 tablet 2    HYDROcodone-acetaminophen (NORCO) 5-325 mg per tablet  Take 1 tablet by mouth every 6 (six) hours as needed for Pain. 15 tablet 0    levothyroxine (SYNTHROID) 75 MCG tablet Take 1 tablet (75 mcg total) by mouth once daily. 30 tablet 11    lidocaine-prilocaine (EMLA) cream APPLY ATLEAST 30 MINUTES BEFORE TREATMENT 3 TIMES A WEEK 30 g 11    metoprolol succinate (TOPROL-XL) 100 MG 24 hr tablet Take 1/2 tablet (50mg) once daily 90 tablet 3    mv,Ca,min-folic acid-vit K1 (ONE-A-DAY WOMEN'S 50 PLUS) 400-20 mcg Tab Take 1 tablet by mouth once daily.      polyethylene glycol (GLYCOLAX) 17 gram/dose powder Take 17 g by mouth once daily. 235 g 0    promethazine (PHENERGAN) 25 MG tablet Take 1 tablet (25 mg total) by mouth every 6 (six) hours as needed for Nausea. 15 tablet 0    acetaminophen (TYLENOL) 500 MG tablet Take 500 mg by mouth every 6 (six) hours as needed for Pain.      naloxone (NARCAN) 1 mg/mL injection 2 mg (1 mg per nostril) by Nasal route as needed for opioid overdose; may repeat in 3 to 5 minutes if not effective. Call 911 2 mL 11    [DISCONTINUED] amLODIPine (NORVASC) 5 MG tablet TAKE 1 TABLET BY MOUTH EVERY DAY 90 tablet 3       Past Surgical History:   Procedure Laterality Date    BREAST CYST EXCISION      COLONOSCOPY N/A 11/12/2015    COLONOSCOPY N/A 3/12/2021    Procedure: COLONOSCOPY;  Surgeon: Brendon Lanier MD;  Location: [facility];  Service: Endoscopy;  Laterality: N/A;  covid test 3/9, labs prior, prep instr mailed -ml    INSERTION OF BIVENTRICULAR IMPLANTABLE CARDIOVERTER-DEFIBRILLATOR (ICD)  04/2021    INSERTION OF TUNNELED CENTRAL VENOUS CATHETER (CVC) WITH SUBCUTANEOUS PORT N/A 1/25/2023    Procedure: INSERTION, DUAL LUMEN CATHETER WITH PORT, WITH IMAGING GUIDANCE;  Surgeon: FARIDEH Muñoz III, MD;  Location: [facility] OR [address] [place] [address];  Service: Peripheral Vascular;  Laterality: N/A;  possinble permcath placment    NEPHRECTOMY-LAPAROSCOPIC Left 01/12/2016    PERCUTANEOUS TRANSLUMINAL ANGIOPLASTY OF ARTERIOVENOUS FISTULA Left 4/19/2023     Procedure: PTA, AV FISTULA;  Surgeon: FARIDEH Muñoz III, MD;  Location: [facility] CATH LAB;  Service: Peripheral Vascular;  Laterality: Left;    PERITONEAL CATHETER INSERTION      Permacath insertion  01/12/2017    PLACEMENT OF ARTERIOVENOUS GRAFT  12/28/2022    Procedure: INSERTION, GRAFT, ARTERIOVENOUS;  Surgeon: FARIDEH Muñoz III, MD;  Location: [facility] OR 2ND FLR;  Service: Peripheral Vascular;;    REMOVAL, GRAFT, ARTERIOVENOUS, UPPER EXTREMITY Left 1/25/2023    Procedure: REMOVAL, GRAFT, ARTERIOVENOUS, UPPER EXTREMITY;  Surgeon: FARIDEH Muñoz III, MD;  Location: [facility] OR [facility] FLR;  Service: Peripheral Vascular;  Laterality: Left;    REVISION OF ARTERIOVENOUS FISTULA Left 5/1/2023    Procedure: REVISION, AV FISTULA;  Surgeon: FARIDEH Muñoz III, MD;  Location: [facility] OR [facility] FLR;  Service: Peripheral Vascular;  Laterality: Left;  LUE AVG revision    REVISION OF PROCEDURE INVOLVING ARTERIOVENOUS GRAFT Left 12/28/2022    Procedure: REVISION, PROCEDURE INVOLVING ARTERIOVENOUS GRAFT;  Surgeon: FARIDEH Muñoz III, MD;  [facility];  Service: Peripheral Vascular;  Laterality: Left;    SALPINGOOPHORECTOMY Right 2016    KJB---DAVINCI    TONSILLECTOMY      TUBAL LIGATION         Social History     Socioeconomic History    Marital status:     Number of children: 2   Occupational History    Occupation: [employer]     Employer: [id number]   Tobacco Use    Smoking status: Never    Smokeless tobacco: Never   Substance and Sexual Activity    Alcohol use: No     Comment: . 2 children. works at [employer].    Drug use: Never         CBC:   Recent Labs     07/20/23  0822 07/21/23  0747   WBC 8.14 8.54   RBC 2.36* 3.00*   HGB 7.8* 9.6*   HCT 24.3* 30.6*    212   * 102*   MCH 33.1* 32.0*   MCHC 32.1 31.4*       CMP:   Recent Labs     07/19/23  1546 07/20/23  0822    143   K 4.3 4.8    106   CO2 23 23   BUN 65* 76*   CREATININE 4.8* 5.5*   GLU 93 87   MG  --  2.6    PHOS  --  5.3*   CALCIUM 8.8 9.3   ALBUMIN 2.4* 2.7*   PROT 5.9* 6.6   ALKPHOS 315* 397*   ALT 40 47*   AST 65* 75*   BILITOT 0.7 1.1*         Pre-op Assessment    I have reviewed the Patient Summary Reports.     I have reviewed the Nursing Notes.    I have reviewed the Medications.     Review of Systems  Anesthesia Hx:  No problems with previous Anesthesia  Neg history of prior surgery. Denies Family Hx of Anesthesia complications.   Denies Personal Hx of Anesthesia complications.   Social:  Non-Smoker, No Alcohol Use    Hematology/Oncology:     Oncology Normal    -- Anemia: Hematology Comments: hgb  7.8    EENT/Dental:EENT/Dental Normal   Cardiovascular:   Exercise tolerance: good Hypertension Dysrhythmias atrial fibrillation CHF On apixaban- unknown last dose   Pulmonary:  Pulmonary Normal    Renal/:   Chronic Renal Disease, ESRD, Dialysis HD T, Th, Sa,  Last done 7/20   Hepatic/GI:  Hepatic/GI Normal    Musculoskeletal:  Musculoskeletal Normal    Neurological:  Neurology Normal    Endocrine:   Hypothyroidism    Dermatological:  Skin Normal    Psych:  Psychiatric Normal         Lab Results   Component Value Date    WBC 8.14 07/20/2023    HGB 7.8 (L) 07/20/2023    HCT 24.3 (L) 07/20/2023     (H) 07/20/2023     07/20/2023         Chemistry        Component Value Date/Time     07/20/2023 0822    K 4.8 07/20/2023 0822     07/20/2023 0822    CO2 23 07/20/2023 0822    BUN 76 (H) 07/20/2023 0822    CREATININE 5.5 (H) 07/20/2023 0822    GLU 87 07/20/2023 0822        Component Value Date/Time    CALCIUM 9.3 07/20/2023 0822    ALKPHOS 397 (H) 07/20/2023 0822    AST 75 (H) 07/20/2023 0822    ALT 47 (H) 07/20/2023 0822    BILITOT 1.1 (H) 07/20/2023 0822    ESTGFRAFRICA 13 (A) 06/28/2022 0959    EGFRNONAA 11 (A) 06/28/2022 0959      Results for orders placed during the hospital encounter of 05/08/23    Echo Saline Bubble? No    Interpretation Summary  · The left ventricle is normal in size with  mild eccentric hypertrophy and severely decreased systolic function.  · The estimated ejection fraction is 23%.  · There is severe left ventricular global hypokinesis.  · Grade II left ventricular diastolic dysfunction.  · Moderate left atrial enlargement.  · Normal right ventricular size with normal right ventricular systolic function.  · Moderate right atrial enlargement.  · There is mild aortic valve stenosis.  · Aortic valve area is 1.58 cm2; peak velocity is 2.01 m/s; mean gradient is 8 mmHg.  · Moderate mitral regurgitation.  · Moderate to severe tricuspid regurgitation.  · Mild pulmonic regurgitation.  · Elevated central venous pressure (15 mmHg).  · The estimated PA systolic pressure is 47 mmHg.  · There is mild-moderate pulmonary hypertension.  · Moderate posterolateral pericardial effusion. Trivial under the RA      Physical Exam  General: Well nourished, Alert, Oriented and Cooperative    Airway:  Mallampati: II / II  Mouth Opening: Normal  TM Distance: Normal  Tongue: Normal  Neck ROM: Normal ROM    Dental:  Intact    Chest/Lungs:  Normal Respiratory Rate    Heart:  Rate: Normal  Rhythm: Regular Rhythm        Anesthesia Plan  Type of Anesthesia, risks & benefits discussed:    Anesthesia Type: Regional  Intra-op Monitoring Plan: Standard ASA Monitors  Induction:  IV  Informed Consent: Informed consent signed with the Patient and all parties understand the risks and agree with anesthesia plan.  All questions answered. Patient consented to blood products? Yes  ASA Score: 3    Ready For Surgery From Anesthesia Perspective.     .

## 2023-07-21 NOTE — PLAN OF CARE
Problem: Skin Injury Risk Increased  Goal: Skin Health and Integrity  Intervention: Optimize Skin Protection  Flowsheets (Taken 7/21/2023 0313)  Pressure Reduction Techniques:   frequent weight shift encouraged   pressure points protected  Skin Protection: incontinence pads utilized     Problem: Fall Injury Risk  Goal: Absence of Fall and Fall-Related Injury  Intervention: Promote Injury-Free Environment  Flowsheets (Taken 7/21/2023 0313)  Safety Promotion/Fall Prevention:   assistive device/personal item within reach   bed alarm set   Fall Risk reviewed with patient/family   Fall Risk signage in place   lighting adjusted   medications reviewed   nonskid shoes/socks when out of bed   side rails raised x 2     Problem: Infection  Goal: Absence of Infection Signs and Symptoms  Intervention: Prevent or Manage Infection  Flowsheets (Taken 7/21/2023 0313)  Infection Management: (IV Rocephin 1g Q24) --

## 2023-07-21 NOTE — PROGRESS NOTES
Hand off report to FELIPE Escobedo IR.  VSS per flow sheet. Drowsy; wakes to voice. Denies pain to LUE.  Post op labs drawn.  [facility] to [facility]. Spouse in waiting area.

## 2023-07-22 LAB
ANION GAP SERPL CALC-SCNC: 19 MMOL/L (ref 8–16)
BACTERIA SPEC AEROBE CULT: ABNORMAL
BASOPHILS # BLD AUTO: 0.08 K/UL (ref 0–0.2)
BASOPHILS NFR BLD: 1 % (ref 0–1.9)
BUN SERPL-MCNC: 62 MG/DL (ref 6–20)
CALCIUM SERPL-MCNC: 9.8 MG/DL (ref 8.7–10.5)
CHLORIDE SERPL-SCNC: 102 MMOL/L (ref 95–110)
CO2 SERPL-SCNC: 17 MMOL/L (ref 23–29)
CREAT SERPL-MCNC: 5.1 MG/DL (ref 0.5–1.4)
DIFFERENTIAL METHOD: ABNORMAL
EOSINOPHIL # BLD AUTO: 0.1 K/UL (ref 0–0.5)
EOSINOPHIL NFR BLD: 1.8 % (ref 0–8)
ERYTHROCYTE [DISTWIDTH] IN BLOOD BY AUTOMATED COUNT: 18.6 % (ref 11.5–14.5)
EST. GFR  (NO RACE VARIABLE): 9 ML/MIN/1.73 M^2
GLUCOSE SERPL-MCNC: 91 MG/DL (ref 70–110)
GRAM STN SPEC: NORMAL
HCT VFR BLD AUTO: 28.4 % (ref 37–48.5)
HGB BLD-MCNC: 9.1 G/DL (ref 12–16)
IMM GRANULOCYTES # BLD AUTO: 0.07 K/UL (ref 0–0.04)
IMM GRANULOCYTES NFR BLD AUTO: 0.9 % (ref 0–0.5)
LYMPHOCYTES # BLD AUTO: 1.2 K/UL (ref 1–4.8)
LYMPHOCYTES NFR BLD: 15.3 % (ref 18–48)
MCH RBC QN AUTO: 32.5 PG (ref 27–31)
MCHC RBC AUTO-ENTMCNC: 32 G/DL (ref 32–36)
MCV RBC AUTO: 101 FL (ref 82–98)
MONOCYTES # BLD AUTO: 1 K/UL (ref 0.3–1)
MONOCYTES NFR BLD: 13.6 % (ref 4–15)
NEUTROPHILS # BLD AUTO: 5.2 K/UL (ref 1.8–7.7)
NEUTROPHILS NFR BLD: 67.4 % (ref 38–73)
NRBC BLD-RTO: 1 /100 WBC
PLATELET # BLD AUTO: 203 K/UL (ref 150–450)
PMV BLD AUTO: 10.3 FL (ref 9.2–12.9)
POTASSIUM SERPL-SCNC: 4.8 MMOL/L (ref 3.5–5.1)
RBC # BLD AUTO: 2.8 M/UL (ref 4–5.4)
SODIUM SERPL-SCNC: 138 MMOL/L (ref 136–145)
VANCOMYCIN SERPL-MCNC: 22.2 UG/ML
WBC # BLD AUTO: 7.67 K/UL (ref 3.9–12.7)

## 2023-07-22 PROCEDURE — 63600175 PHARM REV CODE 636 W HCPCS: Performed by: INTERNAL MEDICINE

## 2023-07-22 PROCEDURE — 25000003 PHARM REV CODE 250: Performed by: INTERNAL MEDICINE

## 2023-07-22 PROCEDURE — 80048 BASIC METABOLIC PNL TOTAL CA: CPT | Performed by: HOSPITALIST

## 2023-07-22 PROCEDURE — 25000003 PHARM REV CODE 250: Performed by: PHYSICIAN ASSISTANT

## 2023-07-22 PROCEDURE — 11000001 HC ACUTE MED/SURG PRIVATE ROOM

## 2023-07-22 PROCEDURE — 63600175 PHARM REV CODE 636 W HCPCS: Performed by: PHYSICIAN ASSISTANT

## 2023-07-22 PROCEDURE — 36415 COLL VENOUS BLD VENIPUNCTURE: CPT | Performed by: INTERNAL MEDICINE

## 2023-07-22 PROCEDURE — 80202 ASSAY OF VANCOMYCIN: CPT | Performed by: INTERNAL MEDICINE

## 2023-07-22 PROCEDURE — 80100016 HC MAINTENANCE HEMODIALYSIS

## 2023-07-22 PROCEDURE — 85025 COMPLETE CBC W/AUTO DIFF WBC: CPT | Performed by: HOSPITALIST

## 2023-07-22 RX ORDER — CLONIDINE 0.3 MG/24H
1 PATCH, EXTENDED RELEASE TRANSDERMAL
Status: DISCONTINUED | OUTPATIENT
Start: 2023-07-22 | End: 2023-07-29 | Stop reason: HOSPADM

## 2023-07-22 RX ORDER — ONDANSETRON 2 MG/ML
8 INJECTION INTRAMUSCULAR; INTRAVENOUS EVERY 6 HOURS PRN
Status: DISCONTINUED | OUTPATIENT
Start: 2023-07-22 | End: 2023-07-29 | Stop reason: HOSPADM

## 2023-07-22 RX ORDER — ONDANSETRON HYDROCHLORIDE 4 MG/5ML
8 SOLUTION ORAL EVERY 6 HOURS PRN
Status: DISCONTINUED | OUTPATIENT
Start: 2023-07-22 | End: 2023-07-22

## 2023-07-22 RX ORDER — HYDRALAZINE HYDROCHLORIDE 20 MG/ML
10 INJECTION INTRAMUSCULAR; INTRAVENOUS ONCE
Status: COMPLETED | OUTPATIENT
Start: 2023-07-22 | End: 2023-07-22

## 2023-07-22 RX ORDER — HYDRALAZINE HYDROCHLORIDE 25 MG/1
100 TABLET, FILM COATED ORAL EVERY 12 HOURS
Status: DISCONTINUED | OUTPATIENT
Start: 2023-07-22 | End: 2023-07-29 | Stop reason: HOSPADM

## 2023-07-22 RX ADMIN — METOPROLOL SUCCINATE 50 MG: 50 TABLET, EXTENDED RELEASE ORAL at 11:07

## 2023-07-22 RX ADMIN — CEFTRIAXONE 1 G: 1 INJECTION, POWDER, FOR SOLUTION INTRAMUSCULAR; INTRAVENOUS at 05:07

## 2023-07-22 RX ADMIN — LEVOTHYROXINE SODIUM 75 MCG: 0.03 TABLET ORAL at 02:07

## 2023-07-22 RX ADMIN — MUPIROCIN: 20 OINTMENT TOPICAL at 09:07

## 2023-07-22 RX ADMIN — EPOETIN ALFA-EPBX 6000 UNITS: 10000 INJECTION, SOLUTION INTRAVENOUS; SUBCUTANEOUS at 11:07

## 2023-07-22 RX ADMIN — HYDROCODONE BITARTRATE AND ACETAMINOPHEN 1 TABLET: 5; 325 TABLET ORAL at 09:07

## 2023-07-22 RX ADMIN — HYDRALAZINE HYDROCHLORIDE 10 MG: 20 INJECTION INTRAMUSCULAR; INTRAVENOUS at 01:07

## 2023-07-22 RX ADMIN — HYDROCODONE BITARTRATE AND ACETAMINOPHEN 1 TABLET: 5; 325 TABLET ORAL at 03:07

## 2023-07-22 RX ADMIN — ONDANSETRON 8 MG: 2 INJECTION INTRAMUSCULAR; INTRAVENOUS at 08:07

## 2023-07-22 RX ADMIN — ONDANSETRON 8 MG: 2 INJECTION INTRAMUSCULAR; INTRAVENOUS at 11:07

## 2023-07-22 RX ADMIN — APIXABAN 2.5 MG: 2.5 TABLET, FILM COATED ORAL at 09:07

## 2023-07-22 RX ADMIN — CLONIDINE 1 PATCH: 0.3 PATCH TRANSDERMAL at 05:07

## 2023-07-22 RX ADMIN — SACUBITRIL AND VALSARTAN 1 TABLET: 24; 26 TABLET, FILM COATED ORAL at 09:07

## 2023-07-22 RX ADMIN — HYDRALAZINE HYDROCHLORIDE 100 MG: 25 TABLET, FILM COATED ORAL at 09:07

## 2023-07-22 NOTE — PROGRESS NOTES
[facility] Medicine  Progress Note    Patient Name: Eva Bloom  MRN: [id number]  Patient Class: IP- Inpatient   Admission Date: 7/19/2023  Length of Stay: 2 days  Attending Physician: To Perez MD  Primary Care Provider: Sowmya Mccain MD        Subjective:     Principal Problem:Toe ulcer        HPI:  Eva Bloom 57 y.o. female with afib on eliquis, HTN, CHF, ESRD on HD T/TH/S, RCC presents to the hospital chief complaint of toe wound.  She reports 1 month an ulcer to left toe that occurred after a chair rolled over foot.  He is had increasing pain to the toe described as a throbbing.  She attempted treatment at home with ice without improvement.  She is dialyzed Tuesday Thursday Saturday at [facility] in [place].  She no longer makes urine.  She denies fever chest pain shortness breast nausea vomiting abdominal pain melena hematuria hematemesis dizziness syncope.    In the ED, afebrile without leukocytosis x-ray foot without fracture or dislocation potassium 4.3 lactic acid negative heme-positive stool.      Overview/Hospital Course:  Eva Bloom 57 y.o. female with afib on eliquis, HTN, CHF, ESRD on HD T/TH/S, RCC presents to the hospital chief complaint of toe wound.  She reports 1 month an ulcer to left toe that occurred after a chair rolled over foot.  He is had increasing pain to the toe described as a throbbing.  She attempted treatment at home with ice without improvement.  She is dialyzed Tuesday Thursday Saturday at [facility] in [place].  She no longer makes urine.  She denies fever chest pain shortness breast nausea vomiting abdominal pain melena hematuria hematemesis dizziness syncope.    In the ED, afebrile without leukocytosis x-ray foot without fracture or dislocation potassium 4.3 lactic acid negative heme-positive stool.podiatry is consulted  and no plan for intervention until PAD is addressed by vascular surgery,US arterial show bilateral 50%  stenosis,contacted vascular surgery.vascular also planing Plan for Left arm exposed AVG excision with possible ligation vs revision with new AVG placement.  Per GI patient has hemorrhoid,started on hydrocortisone,eliquis on hold at this time.HH is improved with pack of RBC.    A/P,patient came with left toe ulcer ,X ray show no osteo,but patient has PAD, and AVG malfunction,vascular planing doing, Plan for Left arm exposed AVG excision with possible ligation vs revision with new AVG placement.  Podiatry say patient need revascularization of leg,notified vascular,GI say patient has hemorrhoid,no endoscopy by GI,eliquis remains on Hold and HH is improved with pack of RBC.  Vascular want do leg angiography next week.       Interval History: No new complaints     Review of Systems   Constitutional:  Negative for activity change.   HENT:  Negative for congestion.    Respiratory:  Negative for chest tightness.    Cardiovascular:  Negative for chest pain.   Genitourinary:  Negative for difficulty urinating.   Objective:     Vital Signs (Most Recent):  Temp: 97.8 °F (36.6 °C) (07/22/23 0655)  Pulse: 96 (07/22/23 0655)  Resp: 18 (07/22/23 0655)  BP: (!) 148/80 (07/22/23 0655)  SpO2: 97 % (07/22/23 0655) Vital Signs (24h Range):  Temp:  [97.5 °F (36.4 °C)-98.2 °F (36.8 °C)] 97.8 °F (36.6 °C)  Pulse:  [] 96  Resp:  [12-28] 18  SpO2:  [94 %-100 %] 97 %  BP: (135-181)/() 148/80     Weight: 52.5 kg (115 lb 11.9 oz)  Body mass index is 19.87 kg/m².    Intake/Output Summary (Last 24 hours) at 7/22/2023 0905  Last data filed at 7/21/2023 2001  Gross per 24 hour   Intake 410 ml   Output 100 ml   Net 310 ml         Physical Exam  Constitutional:       Appearance: Normal appearance. She is not ill-appearing.   HENT:      Head: Atraumatic.   Cardiovascular:      Rate and Rhythm: Normal rate.   Pulmonary:      Effort: Pulmonary effort is normal. No respiratory distress.   Neurological:      Mental Status: She is alert.            Significant Labs: All pertinent labs within the past 24 hours have been reviewed.  BMP:   Recent Labs   Lab 07/22/23  0540   GLU 91      K 4.8      CO2 17*   BUN 62*   CREATININE 5.1*   CALCIUM 9.8     CBC:   Recent Labs   Lab 07/21/23  0747 07/21/23  1532 07/22/23  0540   WBC 8.54 7.02 7.67   HGB 9.6* 8.8* 9.1*   HCT 30.6* 27.9* 28.4*    182 203       Significant Imaging:       Assessment/Plan:      * Toe ulcer  Presents with ulcer to left toe after mechanical injury 1 month ago. Afebrile without leukocytosis. X-ray without fracture or dislocation  Podiatry consulted  Continue rocephin/vanc  Will check ABIs  Blood cultures pending,  podiatry is consulted  and no plan for intervention until PAD is addressed by vascular surgery,US arterial show severe  PAD,.plan for angiography next week       Critical limb ischemia of left lower extremity with gangrene    On arterial US,  Vascular want do leg angiography next week.     Hypothyroidism  Lab Results   Component Value Date    TSH 15.273 (H) 07/10/2023     Resume home synthroid has not been taking outpatient    Aortic atherosclerosis  On medical therapy.      PAF (paroxysmal atrial fibrillation)  Patient with Paroxysmal (<7 days) atrial fibrillation which is controlled currently with Beta Blocker.  Anticoagulation not indicated due to home eliquis held given anemia and Heme-positive stool in ED.    ICD (implantable cardioverter-defibrillator) in place - SubQ  Stable denies discharge    Anemia in ESRD (end-stage renal disease)  Presents with hemoglobin of 6.2. previously 8 to 9 2 weeks ago. Denies active bleeding. Heme-positive in ED.   Type and screen and transfuse 1 unit  Will hold home eliquis as heme positive   GI consulted  Repeat CBC  HH is improved.    Chronic combined systolic and diastolic congestive heart failure  Patient is identified as having Combined Systolic and Diastolic heart failure that is Chronic. CHF is currently controlled.  Latest ECHO performed and demonstrates- Results for orders placed during the hospital encounter of 05/08/23    Echo Saline Bubble? No    Interpretation Summary  · The left ventricle is normal in size with mild eccentric hypertrophy and severely decreased systolic function.  · The estimated ejection fraction is 23%.  · There is severe left ventricular global hypokinesis.  · Grade II left ventricular diastolic dysfunction.  · Moderate left atrial enlargement.  · Normal right ventricular size with normal right ventricular systolic function.  · Moderate right atrial enlargement.  · There is mild aortic valve stenosis.  · Aortic valve area is 1.58 cm2; peak velocity is 2.01 m/s; mean gradient is 8 mmHg.  · Moderate mitral regurgitation.  · Moderate to severe tricuspid regurgitation.  · Mild pulmonic regurgitation.  · Elevated central venous pressure (15 mmHg).  · The estimated PA systolic pressure is 47 mmHg.  · There is mild-moderate pulmonary hypertension.  · Moderate posterolateral pericardial effusion. Trivial under the RA  . Continue Beta Blocker and ARNI and monitor clinical status closely. Monitor on telemetry. Patient is off CHF pathway.  Monitor strict Is&Os and daily weights.  Place on fluid restriction of 1 L. Cardiology has not been any consulted. Continue to stress to patient importance of self efficacy and  on diet for CHF. Last BNP reviewed- and noted below No results for input(s): BNP, BNPTRIAGEBLO in the last 168 hours..    ESRD (end stage renal disease) on dialysis  Nephrology consulted for HD,  AVG malfunction,vascular planing doing, Plan for Left arm exposed AVG excision with possible ligation vs revision with new AVG placement.      Pulmonary hypertension  Fluid removal with HD.      History of kidney cancer  Followed by oncology outpatient. Home cabometyx held for last week due to diarrhea. C diff negative 7/2023 though previously positive. Continue outpatient oncology follow up    Essential  hypertension  Hypotensive on arrival. Home hydralazine, held. Continue home metoprolol with hold parameters for HR and BP. will hold resuming clonidine patch      VTE Risk Mitigation (From admission, onward)         Ordered     IP VTE HIGH RISK PATIENT  Once         07/19/23 1747     Place sequential compression device  Until discontinued         07/19/23 1747     Place PING hose  Until discontinued         07/19/23 1747                Discharge Planning   MAVIS:      Code Status: Full Code   Is the patient medically ready for discharge?:     Reason for patient still in hospital (select all that apply): Patient unstable  Discharge Plan A: Home with family                  To Ramírez MD  Department of Hospital Medicine   [facility] S[facility]

## 2023-07-22 NOTE — NURSING
Back on floor from dialysis. No complaints, no signs of distress. /117. MD notified of increased BP, new orders given.

## 2023-07-22 NOTE — PLAN OF CARE
Problem: Adult Inpatient Plan of Care  Goal: Plan of Care Review  Outcome: Ongoing, Progressing  Goal: Patient-Specific Goal (Individualized)  Outcome: Ongoing, Progressing  Goal: Absence of Hospital-Acquired Illness or Injury  Outcome: Ongoing, Progressing  Goal: Optimal Comfort and Wellbeing  Outcome: Ongoing, Progressing  Goal: Readiness for Transition of Care  Outcome: Ongoing, Progressing     Problem: Skin Injury Risk Increased  Goal: Skin Health and Integrity  Outcome: Ongoing, Progressing     Problem: Fall Injury Risk  Goal: Absence of Fall and Fall-Related Injury  Outcome: Ongoing, Progressing     Problem: Device-Related Complication Risk (Hemodialysis)  Goal: Safe, Effective Therapy Delivery  Outcome: Ongoing, Progressing     Problem: Hemodynamic Instability (Hemodialysis)  Goal: Effective Tissue Perfusion  Outcome: Ongoing, Progressing     Problem: Infection (Hemodialysis)  Goal: Absence of Infection Signs and Symptoms  Outcome: Ongoing, Progressing     Problem: Infection  Goal: Absence of Infection Signs and Symptoms  Outcome: Ongoing, Progressing     Problem: Anemia  Goal: Anemia Symptom Improvement  Outcome: Ongoing, Progressing     Problem: Pain Acute  Goal: Acceptable Pain Control and Functional Ability  Outcome: Ongoing, Progressing     Problem: Oral Intake Inadequate  Goal: Improved Oral Intake  Outcome: Ongoing, Progressing     Problem: Impaired Wound Healing  Goal: Optimal Wound Healing  Outcome: Ongoing, Progressing

## 2023-07-22 NOTE — PROGRESS NOTES
Pharmacokinetic Assessment Follow Up: IV Vancomycin    Vancomycin serum concentration assessment(s):    The random level was drawn correctly and can be used to guide therapy at this time. The measurement is above the desired definitive target range of 10 to 20 mcg/mL.    Vancomycin Regimen Plan:    Re-dose when the random level is less than 20 mcg/mL, next level to be drawn at 0600 on 7/25/23    Drug levels (last 3 results):  Recent Labs   Lab Result Units 07/20/23  0822 07/22/23  0541   Vancomycin, Random ug/mL 17.7  17.2 22.2       Pharmacy will continue to follow and monitor vancomycin.    Please contact pharmacy at extension [phone] for questions regarding this assessment.    Thank you for the consult,   Miguel De Los Santos       Patient brief summary:  Eva Bloom is a 57 y.o. female initiated on antimicrobial therapy with IV Vancomycin for treatment of skin & soft tissue infection    The patient's current regimen is random pulse dosing    Drug Allergies:   Review of patient's allergies indicates:   Allergen Reactions    Coreg [carvedilol] Other (See Comments)     Nausea/vomiting    Allopurinol      Other reaction(s): abnormal transaminases       Actual Body Weight:   52.5 kg    Renal Function:   Estimated Creatinine Clearance: 10 mL/min (A) (based on SCr of 5.1 mg/dL (H)).,     Dialysis Method (if applicable):  intermittent HD    CBC (last 72 hours):  Recent Labs   Lab Result Units 07/19/23  1546 07/20/23  0822 07/21/23  0747 07/21/23  1532 07/22/23  0540   WBC K/uL 6.36 8.14 8.54 7.02 7.67   Hemoglobin g/dL 6.2* 7.8* 9.6* 8.8* 9.1*   Hematocrit % 19.8* 24.3* 30.6* 27.9* 28.4*   Platelets K/uL 177 182 212 182 203   Gran % % 59.4 62.3 63.1 65.6 67.4   Lymph % % 20.6 21.3 18.4 16.4* 15.3*   Mono % % 15.9* 12.0 14.3 14.2 13.6   Eosinophil % % 2.7 3.3 2.3 1.9 1.8   Basophil % % 0.6 0.6 0.7 0.9 1.0   Differential Method  Automated Automated Automated Automated Automated       Metabolic Panel (last 72  hours):  Recent Labs   Lab Result Units 07/19/23  1546 07/20/23  0822 07/21/23  0747 07/21/23  1532 07/22/23  0540   Sodium mmol/L 143 143  --  138 138   Potassium mmol/L 4.3 4.8 4.2 4.5 4.8   Chloride mmol/L 107 106  --  103 102   CO2 mmol/L 23 23  --  19* 17*   Glucose mg/dL 93 87  --  76 91   BUN mg/dL 65* 76*  --  52* 62*   Creatinine mg/dL 4.8* 5.5*  --  4.4* 5.1*   Albumin g/dL 2.4* 2.7*  --  2.6*  --    Total Bilirubin mg/dL 0.7 1.1*  --  1.4*  --    Alkaline Phosphatase U/L 315* 397*  --  388*  --    AST U/L 65* 75*  --  71*  --    ALT U/L 40 47*  --  54*  --    Magnesium mg/dL  --  2.6  --   --   --    Phosphorus mg/dL  --  5.3*  --   --   --        Vancomycin Administrations:  vancomycin given in the last 96 hours                     vancomycin injection (g) 1 g Given 07/21/23 1300    vancomycin (VANCOCIN) 500 mg in dextrose 5 % in water (D5W) 5 % 100 mL IVPB (MB+) (mg) 500 mg New Bag 07/20/23 1717    vancomycin (VANCOCIN) 1,000 mg in dextrose 5 % (D5W) 250 mL IVPB (Vial-Mate) (mg) 1,000 mg New Bag 07/19/23 1619                    Microbiologic Results:  Microbiology Results (last 7 days)       Procedure Component Value Units Date/Time    AFB Culture & Smear [[id number]] Collected: 07/21/23 1300    Order Status: Sent Specimen: Incision site from Arm, Left Updated: 07/21/23 1749    Fungus culture [[id number]] Collected: 07/21/23 1300    Order Status: Sent Specimen: Incision site from Arm, Left Updated: 07/21/23 1748    Gram stain [[id number]] Collected: 07/21/23 1300    Order Status: Sent Specimen: Incision site from Arm, Left Updated: 07/21/23 1748    Aerobic culture [[id number]] Collected: 07/21/23 1300    Order Status: Sent Specimen: Incision site from Arm, Left Updated: 07/21/23 1747    Culture, Anaerobe [[id number]] Collected: 07/21/23 1300    Order Status: Sent Specimen: Incision site from Arm, Left Updated: 07/21/23 1747    AFB Culture & Smear [[id number]] Collected: 07/21/23 1300    Order Status: Sent  Specimen: Incision site from Arm, Left Updated: 07/21/23 1723    Fungus culture [[id number]] Collected: 07/21/23 1300    Order Status: Sent Specimen: Incision site from Arm, Left Updated: 07/21/23 1722    Gram stain [[id number]] Collected: 07/21/23 1300    Order Status: Sent Specimen: Incision site from Arm, Left Updated: 07/21/23 1722    Aerobic culture [[id number]] Collected: 07/21/23 1300    Order Status: Sent Specimen: Incision site from Arm, Left Updated: 07/21/23 1721    Culture, Anaerobe [[id number]] Collected: 07/21/23 1300    Order Status: Sent Specimen: Incision site from Arm, Left Updated: 07/21/23 1721    AFB Culture & Smear [[id number]] Collected: 07/21/23 1400    Order Status: Sent Specimen: Incision site from Arm, Left Updated: 07/21/23 1717    Fungus culture [[id number]] Collected: 07/21/23 1400    Order Status: Sent Specimen: Incision site from Arm, Left Updated: 07/21/23 1717    Gram stain [[id number]] Collected: 07/21/23 1400    Order Status: Sent Specimen: Incision site from Arm, Left Updated: 07/21/23 1716    Aerobic culture [[id number]] Collected: 07/21/23 1400    Order Status: Sent Specimen: Incision site from Arm, Left Updated: 07/21/23 1716    Culture, Anaerobe [[id number]] Collected: 07/21/23 1400    Order Status: Sent Specimen: Incision site from Arm, Left Updated: 07/21/23 1715    AFB Culture & Smear [[id number]] Collected: 07/21/23 1300    Order Status: Sent Specimen: Incision site from Arm, Left Updated: 07/21/23 1712    Fungus culture [[id number]] Collected: 07/21/23 1300    Order Status: Sent Specimen: Incision site from Arm, Left Updated: 07/21/23 1712    Gram stain [[id number]] Collected: 07/21/23 1300    Order Status: Sent Specimen: Incision site from Arm, Left Updated: 07/21/23 1711    Aerobic culture [[id number]] Collected: 07/21/23 1300    Order Status: Sent Specimen: Incision site from Arm, Left Updated: 07/21/23 1711    Culture, Anaerobe [[id number]] Collected: 07/21/23 1300    Order  Status: Sent Specimen: Incision site from Arm, Left Updated: 07/21/23 1711    AFB Culture & Smear [[id number]] Collected: 07/21/23 1300    Order Status: Sent Specimen: Incision site from Arm, Left Updated: 07/21/23 1707    Fungus culture [[id number]] Collected: 07/21/23 1300    Order Status: Sent Specimen: Incision site from Arm, Left Updated: 07/21/23 1706    Gram stain [[id number]] Collected: 07/21/23 1300    Order Status: Sent Specimen: Incision site from Arm, Left Updated: 07/21/23 1706    Blood culture x two cultures. Draw prior to antibiotics. [[id number]] Collected: 07/19/23 1602    Order Status: Completed Specimen: Blood from Peripheral, Forearm, Right Updated: 07/21/23 1703     Blood Culture, Routine No Growth to date      No Growth to date      No Growth to date    Narrative:      Aerobic and anaerobic    Blood culture x two cultures. Draw prior to antibiotics. [[id number]] Collected: 07/19/23 1547    Order Status: Completed Specimen: Blood from Peripheral, Forearm, Right Updated: 07/21/23 1703     Blood Culture, Routine No Growth to date      No Growth to date      No Growth to date    Narrative:      Aerobic and anaerobic    Aerobic culture [[id number]] Collected: 07/21/23 1300    Order Status: Sent Specimen: Incision site from Arm, Left Updated: 07/21/23 1656    Culture, Anaerobe [[id number]] Collected: 07/21/23 1300    Order Status: Sent Specimen: Incision site from Arm, Left Updated: 07/21/23 1655    Aerobic culture [[id number]] Collected: 07/21/23 1300    Order Status: Sent Specimen: Incision site from Arm, Left Updated: 07/21/23 1321    Fungus culture [[id number]] Collected: 07/21/23 1300    Order Status: Sent Specimen: Incision site from Arm, Left Updated: 07/21/23 1321    Culture, Anaerobe [[id number]] Collected: 07/21/23 1300    Order Status: Sent Specimen: Incision site from Arm, Left Updated: 07/21/23 1321    AFB Culture & Smear [[id number]] Collected: 07/21/23 1300    Order Status: Sent Specimen:  Incision site from Arm, Left Updated: 07/21/23 1321    Gram stain [[id number]] Collected: 07/21/23 1300    Order Status: Sent Specimen: Incision site from Arm, Left Updated: 07/21/23 1321    Aerobic culture (Specify Source) **CANNOT BE ORDERED AS STAT** [[id number]]  (Abnormal) Collected: 07/20/23 0528    Order Status: Completed Specimen: Wound from Toe, Left Foot Updated: 07/21/23 1038     Aerobic Bacterial Culture COAGULASE-NEGATIVE STAPHYLOCOCCUS SPECIES  Many  Identification and susceptibility pending      Narrative:      Left middle toe

## 2023-07-22 NOTE — NURSING
Patient AAOx4, respirations even and unlabored on room air, no acute distress noted. VSS. Dressing remains CDI. Wound care performed per order. All safety precautions maintained. Will continue to monitor.

## 2023-07-22 NOTE — NURSING
[facility]  Nurses Note -- 4 Eyes      7/22/2023       Skin assessed on: Q Shift      [x] No Pressure Injuries Present    [x]Prevention Measures Documented    [] Yes LDA  for Pressure Injury Previously documented     [] Yes New Pressure Injury Discovered   [] LDA for New Pressure Injury Added      Attending RN:  Katelyn Tello, RN     Second RN:  nurse du Stewart

## 2023-07-22 NOTE — SUBJECTIVE & OBJECTIVE
Interval History: No new complaints     Review of Systems   Constitutional:  Negative for activity change.   HENT:  Negative for congestion.    Respiratory:  Negative for chest tightness.    Cardiovascular:  Negative for chest pain.   Genitourinary:  Negative for difficulty urinating.   Objective:     Vital Signs (Most Recent):  Temp: 97.8 °F (36.6 °C) (07/22/23 0655)  Pulse: 96 (07/22/23 0655)  Resp: 18 (07/22/23 0655)  BP: (!) 148/80 (07/22/23 0655)  SpO2: 97 % (07/22/23 0655) Vital Signs (24h Range):  Temp:  [97.5 °F (36.4 °C)-98.2 °F (36.8 °C)] 97.8 °F (36.6 °C)  Pulse:  [] 96  Resp:  [12-28] 18  SpO2:  [94 %-100 %] 97 %  BP: (135-181)/() 148/80     Weight: 52.5 kg (115 lb 11.9 oz)  Body mass index is 19.87 kg/m².    Intake/Output Summary (Last 24 hours) at 7/22/2023 0905  Last data filed at 7/21/2023 2001  Gross per 24 hour   Intake 410 ml   Output 100 ml   Net 310 ml         Physical Exam  Constitutional:       Appearance: Normal appearance. She is not ill-appearing.   HENT:      Head: Atraumatic.   Cardiovascular:      Rate and Rhythm: Normal rate.   Pulmonary:      Effort: Pulmonary effort is normal. No respiratory distress.   Neurological:      Mental Status: She is alert.           Significant Labs: All pertinent labs within the past 24 hours have been reviewed.  BMP:   Recent Labs   Lab 07/22/23  0540   GLU 91      K 4.8      CO2 17*   BUN 62*   CREATININE 5.1*   CALCIUM 9.8     CBC:   Recent Labs   Lab 07/21/23  0747 07/21/23  1532 07/22/23  0540   WBC 8.54 7.02 7.67   HGB 9.6* 8.8* 9.1*   HCT 30.6* 27.9* 28.4*    182 203       Significant Imaging:

## 2023-07-22 NOTE — PLAN OF CARE
Chart check complete, pt aaox4, able to verbalize needs.  IV flushed and saline locked.  Dressing to ALISA intact, scant drainage noted.  HD catheter intact.  Tele box monitored.  Left toes pained with betadine per orders, open to air.  Pt denies pain to toes, reports pain to left shoulder, PRN pain medication given for pain with moderate relief.  Diet tolerated well.  No acute distress noted, pt free from falls or injury this shift.  Bed in low position, wheels locked, call light in reach for assistance, will continue to monitor.      Problem: Adult Inpatient Plan of Care  Goal: Plan of Care Review  Outcome: Ongoing, Progressing     Problem: Adult Inpatient Plan of Care  Goal: Patient-Specific Goal (Individualized)  Outcome: Ongoing, Progressing     Problem: Adult Inpatient Plan of Care  Goal: Absence of Hospital-Acquired Illness or Injury  Outcome: Ongoing, Progressing     Problem: Adult Inpatient Plan of Care  Goal: Optimal Comfort and Wellbeing  Outcome: Ongoing, Progressing     Problem: Skin Injury Risk Increased  Goal: Skin Health and Integrity  Outcome: Ongoing, Progressing     Problem: Fall Injury Risk  Goal: Absence of Fall and Fall-Related Injury  Outcome: Ongoing, Progressing     Problem: Infection  Goal: Absence of Infection Signs and Symptoms  Outcome: Ongoing, Progressing     Problem: Infection (Hemodialysis)  Goal: Absence of Infection Signs and Symptoms  Outcome: Ongoing, Progressing     Problem: Oral Intake Inadequate  Goal: Improved Oral Intake  Outcome: Ongoing, Progressing

## 2023-07-22 NOTE — PROGRESS NOTES
S/p L AVG excision and ligation.  TDC placed by IR ( following Vascular surgery) for new HD access    Keep left arm elevated as able to reduce swelling  Cont broad spectrum IV abx - vanc and cefepime for 48hrs post infected graft removal  Follow up on intra op graft cultures (exposed graft, and proximal and distal margins of remaining graft sent)  Left arm dressing may be removed Sunday and  be left open to air.    Will plan to address L leg /foot ulcer next week  Please obtain non-urgent CTA w runoff    Obi Werner MD, PhD  Vascular and Endovascular Surgery

## 2023-07-22 NOTE — PROGRESS NOTES
Date of Admission:7/19/2023    SUBJECTIVE:no appetite per pt    Current Facility-Administered Medications   Medication    0.9%  NaCl infusion (for blood administration)    0.9%  NaCl infusion (for blood administration)    0.9%  NaCl infusion    acetaminophen tablet 650 mg    cefTRIAXone (ROCEPHIN) 1 g in dextrose 5 % in water (D5W) 5 % 100 mL IVPB (MB+)    dextrose 50% injection 12.5 g    dextrose 50% injection 25 g    glucagon (human recombinant) injection 1 mg    glucose chewable tablet 16 g    glucose chewable tablet 24 g    HYDROcodone-acetaminophen 5-325 mg per tablet 1 tablet    hydrocortisone 2.5 % rectal cream    levothyroxine tablet 75 mcg    magnesium oxide tablet 800 mg    magnesium oxide tablet 800 mg    melatonin tablet 6 mg    metoprolol succinate (TOPROL-XL) 24 hr tablet 50 mg    mupirocin 2 % ointment    naloxone 0.4 mg/mL injection 0.02 mg    senna-docusate 8.6-50 mg per tablet 1 tablet    sodium chloride 0.9% bolus 250 mL 250 mL    sodium chloride 0.9% flush 10 mL    vancomycin - pharmacy to dose     Facility-Administered Medications Ordered in Other Encounters   Medication    0.9%  NaCl infusion       Wt Readings from Last 3 Encounters:   07/20/23 52.5 kg (115 lb 11.9 oz)   07/12/23 43 kg (94 lb 12.8 oz)   07/06/23 46 kg (101 lb 6.6 oz)     Temp Readings from Last 3 Encounters:   07/22/23 97.8 °F (36.6 °C) (Oral)   07/12/23 97.9 °F (36.6 °C) (Oral)   07/06/23 98.1 °F (36.7 °C) (Oral)     BP Readings from Last 3 Encounters:   07/22/23 (!) 148/80   07/12/23 (!) 143/71   07/06/23 121/60     Pulse Readings from Last 3 Encounters:   07/22/23 96   07/12/23 72   07/06/23 109       Intake/Output Summary (Last 24 hours) at 7/22/2023 0923  Last data filed at 7/22/2023 0830  Gross per 24 hour   Intake 650 ml   Output 100 ml   Net 550 ml         PE:  GEN:wd female in nad  HEENT:ncat,eomi,mm  CVS:s1s2 regular  PULM:ctab  ABD:bs,soft  EXT:no leg edema  NEURO:awake,alert  Pc of chest    Recent Labs   Lab  07/22/23  0540   GLU 91      K 4.8      CO2 17*   BUN 62*   CREATININE 5.1*   CALCIUM 9.8         Lab Results   Component Value Date    PTH >2,500.0 (H) 02/17/2023    CALCIUM 9.8 07/22/2023    PHOS 5.3 (H) 07/20/2023       Recent Labs   Lab 07/22/23  0540   WBC 7.67   RBC 2.80*   HGB 9.1*   HCT 28.4*      *   MCH 32.5*   MCHC 32.0             A/P:  1.esrd. seen on hd. Cont tx TTS.   2.malfxn avg. Excised. No heparin with hd.  3.toe ulcer. Tx per podiatry.  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 Prbc given. Following hg.gib. add epo.  5.kidney cancer. Following.  Tx?  6.htn. sbp ok.  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 Phos ok.  8.chronic syst/dhf. Volume ok.  9.paf. rate ok. Eliquis on hold.  Cont to see tts.

## 2023-07-22 NOTE — NURSING
CT called about CTA ordered. They state that the patient will have to go to dialysis within 24 hours of test. Dr. Carlson notified. Pt in dialysis now. CTA ordered for Monday.

## 2023-07-23 LAB
BACTERIA BLD CULT: NORMAL
BACTERIA BLD CULT: NORMAL

## 2023-07-23 PROCEDURE — 63600175 PHARM REV CODE 636 W HCPCS: Performed by: INTERNAL MEDICINE

## 2023-07-23 PROCEDURE — 63600175 PHARM REV CODE 636 W HCPCS: Performed by: PHYSICIAN ASSISTANT

## 2023-07-23 PROCEDURE — 25000003 PHARM REV CODE 250: Performed by: PHYSICIAN ASSISTANT

## 2023-07-23 PROCEDURE — 25000003 PHARM REV CODE 250: Performed by: INTERNAL MEDICINE

## 2023-07-23 PROCEDURE — 63600175 PHARM REV CODE 636 W HCPCS: Performed by: NURSE PRACTITIONER

## 2023-07-23 PROCEDURE — 11000001 HC ACUTE MED/SURG PRIVATE ROOM

## 2023-07-23 RX ORDER — AMLODIPINE BESYLATE 5 MG/1
10 TABLET ORAL DAILY
Status: DISCONTINUED | OUTPATIENT
Start: 2023-07-23 | End: 2023-07-26

## 2023-07-23 RX ORDER — HYDRALAZINE HYDROCHLORIDE 20 MG/ML
10 INJECTION INTRAMUSCULAR; INTRAVENOUS ONCE
Status: COMPLETED | OUTPATIENT
Start: 2023-07-23 | End: 2023-07-23

## 2023-07-23 RX ADMIN — MUPIROCIN: 20 OINTMENT TOPICAL at 10:07

## 2023-07-23 RX ADMIN — HYDRALAZINE HYDROCHLORIDE 100 MG: 25 TABLET, FILM COATED ORAL at 08:07

## 2023-07-23 RX ADMIN — METOPROLOL SUCCINATE 50 MG: 50 TABLET, EXTENDED RELEASE ORAL at 08:07

## 2023-07-23 RX ADMIN — AMLODIPINE BESYLATE 10 MG: 5 TABLET ORAL at 11:07

## 2023-07-23 RX ADMIN — PROMETHAZINE HYDROCHLORIDE 6.25 MG: 25 INJECTION INTRAMUSCULAR; INTRAVENOUS at 01:07

## 2023-07-23 RX ADMIN — HYDRALAZINE HYDROCHLORIDE 100 MG: 25 TABLET, FILM COATED ORAL at 10:07

## 2023-07-23 RX ADMIN — HYDRALAZINE HYDROCHLORIDE 10 MG: 20 INJECTION INTRAMUSCULAR; INTRAVENOUS at 05:07

## 2023-07-23 RX ADMIN — APIXABAN 2.5 MG: 2.5 TABLET, FILM COATED ORAL at 10:07

## 2023-07-23 RX ADMIN — SACUBITRIL AND VALSARTAN 1 TABLET: 24; 26 TABLET, FILM COATED ORAL at 08:07

## 2023-07-23 RX ADMIN — CEFTRIAXONE 1 G: 1 INJECTION, POWDER, FOR SOLUTION INTRAMUSCULAR; INTRAVENOUS at 06:07

## 2023-07-23 RX ADMIN — THERA TABS 1 TABLET: TAB at 08:07

## 2023-07-23 RX ADMIN — LEVOTHYROXINE SODIUM 75 MCG: 0.03 TABLET ORAL at 08:07

## 2023-07-23 RX ADMIN — SACUBITRIL AND VALSARTAN 1 TABLET: 24; 26 TABLET, FILM COATED ORAL at 10:07

## 2023-07-23 RX ADMIN — APIXABAN 2.5 MG: 2.5 TABLET, FILM COATED ORAL at 08:07

## 2023-07-23 NOTE — PROGRESS NOTES
Patient reviewed, dialysis every TuTH & Sat. Vanco random level 22.2 on 7/22. No dose given.   Next Vanco random level due on 7/25 prior to dialysis

## 2023-07-23 NOTE — PROGRESS NOTES
[facility] Medicine  Progress Note    Patient Name: Eva Bloom  MRN: [id number]  Patient Class: IP- Inpatient   Admission Date: 7/19/2023  Length of Stay: 3 days  Attending Physician: To Perez MD  Primary Care Provider: Sowmya Mccain MD        Subjective:     Principal Problem:Toe ulcer        HPI:  Eva Bloom 57 y.o. female with afib on eliquis, HTN, CHF, ESRD on HD T/TH/S, RCC presents to the hospital chief complaint of toe wound.  She reports 1 month an ulcer to left toe that occurred after a chair rolled over foot.  He is had increasing pain to the toe described as a throbbing.  She attempted treatment at home with ice without improvement.  She is dialyzed Tuesday Thursday Saturday at [facility] in [place].  She no longer makes urine.  She denies fever chest pain shortness breast nausea vomiting abdominal pain melena hematuria hematemesis dizziness syncope.    In the ED, afebrile without leukocytosis x-ray foot without fracture or dislocation potassium 4.3 lactic acid negative heme-positive stool.      Overview/Hospital Course:  Eva Bloom 57 y.o. female with afib on eliquis, HTN, CHF, ESRD on HD T/TH/S, RCC presents to the hospital chief complaint of toe wound.  She reports 1 month an ulcer to left toe that occurred after a chair rolled over foot.  He is had increasing pain to the toe described as a throbbing.  She attempted treatment at home with ice without improvement.  She is dialyzed Tuesday Thursday Saturday at [facility] in [place].  She no longer makes urine.  She denies fever chest pain shortness breast nausea vomiting abdominal pain melena hematuria hematemesis dizziness syncope.    In the ED, afebrile without leukocytosis x-ray foot without fracture or dislocation potassium 4.3 lactic acid negative heme-positive stool.podiatry is consulted  and no plan for intervention until PAD is addressed by vascular surgery,US arterial show bilateral 50%  stenosis,contacted vascular surgery.vascular also planing Plan for Left arm exposed AVG excision with possible ligation vs revision with new AVG placement.  Per GI patient has hemorrhoid,started on hydrocortisone,eliquis on hold at this time.HH is improved with pack of RBC.    A/P,patient came with left toe ulcer ,X ray show no osteo,but patient has PAD, and AVG malfunction,vascular planing doing, Plan for Left arm exposed AVG excision with possible ligation vs revision with new AVG placement.  Podiatry say patient need revascularization of leg,notified vascular,GI say patient has hemorrhoid,no endoscopy by GI,eliquis remains on Hold and HH is improved with pack of RBC.  Vascular want do leg angiography next week.       Interval History: No new issues     Review of Systems   Constitutional:  Negative for activity change.   HENT:  Negative for congestion.    Respiratory:  Negative for chest tightness.    Cardiovascular:  Negative for chest pain.   Genitourinary:  Negative for difficulty urinating.   Objective:     Vital Signs (Most Recent):  Temp: 98.1 °F (36.7 °C) (07/23/23 0719)  Pulse: 104 (07/23/23 0719)  Resp: 18 (07/23/23 0719)  BP: (!) 167/97 (07/23/23 0719)  SpO2: 95 % (07/23/23 0719) Vital Signs (24h Range):  Temp:  [97.9 °F (36.6 °C)-99.1 °F (37.3 °C)] 98.1 °F (36.7 °C)  Pulse:  [] 104  Resp:  [16-18] 18  SpO2:  [90 %-100 %] 95 %  BP: (156-237)/() 167/97     Weight: 52.5 kg (115 lb 11.9 oz)  Body mass index is 19.87 kg/m².    Intake/Output Summary (Last 24 hours) at 7/23/2023 1021  Last data filed at 7/23/2023 0948  Gross per 24 hour   Intake 500 ml   Output 1825 ml   Net -1325 ml         Physical Exam  Constitutional:       Appearance: Normal appearance. She is not ill-appearing.   HENT:      Head: Atraumatic.   Cardiovascular:      Rate and Rhythm: Normal rate.   Pulmonary:      Effort: Pulmonary effort is normal. No respiratory distress.   Neurological:      Mental Status: She is alert.            Significant Labs: All pertinent labs within the past 24 hours have been reviewed.  CBC:   Recent Labs   Lab 07/21/23  1532 07/22/23  0540   WBC 7.02 7.67   HGB 8.8* 9.1*   HCT 27.9* 28.4*    203     CMP:   Recent Labs   Lab 07/21/23  1532 07/22/23  0540    138   K 4.5 4.8    102   CO2 19* 17*   GLU 76 91   BUN 52* 62*   CREATININE 4.4* 5.1*   CALCIUM 9.2 9.8   PROT 6.5  --    ALBUMIN 2.6*  --    BILITOT 1.4*  --    ALKPHOS 388*  --    AST 71*  --    ALT 54*  --    ANIONGAP 16 19*       Significant Imaging:       Assessment/Plan:      * Toe ulcer  Presents with ulcer to left toe after mechanical injury 1 month ago. Afebrile without leukocytosis. X-ray without fracture or dislocation  Podiatry consulted  Continue rocephin/vanc  Will check ABIs  Blood cultures pending,  podiatry is consulted  and no plan for intervention until PAD is addressed by vascular surgery,US arterial show severe  PAD,.plan for angiography next week       Critical limb ischemia of left lower extremity with gangrene    On arterial US,  Vascular want do leg angiography next week.     Hypothyroidism  Lab Results   Component Value Date    TSH 15.273 (H) 07/10/2023     Resume home synthroid has not been taking outpatient    Aortic atherosclerosis  On medical therapy.      PAF (paroxysmal atrial fibrillation)  Patient with Paroxysmal (<7 days) atrial fibrillation which is controlled currently with Beta Blocker.  Anticoagulation not indicated due to home eliquis held given anemia and Heme-positive stool in ED.    ICD (implantable cardioverter-defibrillator) in place - SubQ  Stable denies discharge    Anemia in ESRD (end-stage renal disease)  Presents with hemoglobin of 6.2. previously 8 to 9 2 weeks ago. Denies active bleeding. Heme-positive in ED.   Type and screen and transfuse 1 unit  Will hold home eliquis as heme positive   GI consulted  Repeat CBC  HH is improved.    Chronic combined systolic and diastolic congestive heart  failure  Patient is identified as having Combined Systolic and Diastolic heart failure that is Chronic. CHF is currently controlled. Latest ECHO performed and demonstrates- Results for orders placed during the hospital encounter of 05/08/23    Echo Saline Bubble? No    Interpretation Summary  · The left ventricle is normal in size with mild eccentric hypertrophy and severely decreased systolic function.  · The estimated ejection fraction is 23%.  · There is severe left ventricular global hypokinesis.  · Grade II left ventricular diastolic dysfunction.  · Moderate left atrial enlargement.  · Normal right ventricular size with normal right ventricular systolic function.  · Moderate right atrial enlargement.  · There is mild aortic valve stenosis.  · Aortic valve area is 1.58 cm2; peak velocity is 2.01 m/s; mean gradient is 8 mmHg.  · Moderate mitral regurgitation.  · Moderate to severe tricuspid regurgitation.  · Mild pulmonic regurgitation.  · Elevated central venous pressure (15 mmHg).  · The estimated PA systolic pressure is 47 mmHg.  · There is mild-moderate pulmonary hypertension.  · Moderate posterolateral pericardial effusion. Trivial under the RA  . Continue Beta Blocker and ARNI and monitor clinical status closely. Monitor on telemetry. Patient is off CHF pathway.  Monitor strict Is&Os and daily weights.  Place on fluid restriction of 1 L. Cardiology has not been any consulted. Continue to stress to patient importance of self efficacy and  on diet for CHF. Last BNP reviewed- and noted below No results for input(s): BNP, BNPTRIAGEBLO in the last 168 hours..    ESRD (end stage renal disease) on dialysis  Nephrology consulted for HD,  AVG malfunction,vascular planing doing, Plan for Left arm exposed AVG excision with possible ligation vs revision with new AVG placement.      Pulmonary hypertension  Fluid removal with HD.      History of kidney cancer  Followed by oncology outpatient. Home cabometyx held  for last week due to diarrhea. C diff negative 7/2023 though previously positive. Continue outpatient oncology follow up    Essential hypertension  Hypotensive on arrival. Home hydralazine, held. Continue home metoprolol with hold parameters for HR and BP. will hold resuming clonidine patch      VTE Risk Mitigation (From admission, onward)         Ordered     apixaban tablet 2.5 mg  2 times daily         07/22/23 1718     IP VTE HIGH RISK PATIENT  Once         07/19/23 1747     Place sequential compression device  Until discontinued         07/19/23 1747     Place PING hose  Until discontinued         07/19/23 1747                Discharge Planning   MAVIS:      Code Status: Full Code   Is the patient medically ready for discharge?:     Reason for patient still in hospital (select all that apply): Patient unstable  Discharge Plan A: Home with family                  To Ramírez MD  Department of Hospital Medicine   [facility]

## 2023-07-23 NOTE — NURSING
[facility], [facility] [place]  Nurses Note -- 4 Eyes      7/23/2023       Skin assessed on: Q Shift      [x] No Pressure Injuries Present    [x]Prevention Measures Documented    [] Yes LDA  for Pressure Injury Previously documented     [] Yes New Pressure Injury Discovered   [] LDA for New Pressure Injury Added      Attending RN:  Katelyn Tello, RN     Second RN:  nurse du Stewart

## 2023-07-23 NOTE — SUBJECTIVE & OBJECTIVE
Interval History: No new issues     Review of Systems   Constitutional:  Negative for activity change.   HENT:  Negative for congestion.    Respiratory:  Negative for chest tightness.    Cardiovascular:  Negative for chest pain.   Genitourinary:  Negative for difficulty urinating.   Objective:     Vital Signs (Most Recent):  Temp: 98.1 °F (36.7 °C) (07/23/23 0719)  Pulse: 104 (07/23/23 0719)  Resp: 18 (07/23/23 0719)  BP: (!) 167/97 (07/23/23 0719)  SpO2: 95 % (07/23/23 0719) Vital Signs (24h Range):  Temp:  [97.9 °F (36.6 °C)-99.1 °F (37.3 °C)] 98.1 °F (36.7 °C)  Pulse:  [] 104  Resp:  [16-18] 18  SpO2:  [90 %-100 %] 95 %  BP: (156-237)/() 167/97     Weight: 52.5 kg (115 lb 11.9 oz)  Body mass index is 19.87 kg/m².    Intake/Output Summary (Last 24 hours) at 7/23/2023 1021  Last data filed at 7/23/2023 0948  Gross per 24 hour   Intake 500 ml   Output 1825 ml   Net -1325 ml         Physical Exam  Constitutional:       Appearance: Normal appearance. She is not ill-appearing.   HENT:      Head: Atraumatic.   Cardiovascular:      Rate and Rhythm: Normal rate.   Pulmonary:      Effort: Pulmonary effort is normal. No respiratory distress.   Neurological:      Mental Status: She is alert.           Significant Labs: All pertinent labs within the past 24 hours have been reviewed.  CBC:   Recent Labs   Lab 07/21/23  1532 07/22/23  0540   WBC 7.02 7.67   HGB 8.8* 9.1*   HCT 27.9* 28.4*    203     CMP:   Recent Labs   Lab 07/21/23  1532 07/22/23  0540    138   K 4.5 4.8    102   CO2 19* 17*   GLU 76 91   BUN 52* 62*   CREATININE 4.4* 5.1*   CALCIUM 9.2 9.8   PROT 6.5  --    ALBUMIN 2.6*  --    BILITOT 1.4*  --    ALKPHOS 388*  --    AST 71*  --    ALT 54*  --    ANIONGAP 16 19*       Significant Imaging:

## 2023-07-23 NOTE — PLAN OF CARE
Problem: Adult Inpatient Plan of Care  Goal: Plan of Care Review  Outcome: Ongoing, Progressing  Goal: Patient-Specific Goal (Individualized)  Outcome: Ongoing, Progressing  Goal: Absence of Hospital-Acquired Illness or Injury  Outcome: Ongoing, Progressing  Goal: Optimal Comfort and Wellbeing  Outcome: Ongoing, Progressing  Goal: Readiness for Transition of Care  Outcome: Ongoing, Progressing     Problem: Skin Injury Risk Increased  Goal: Skin Health and Integrity  Outcome: Ongoing, Progressing     Problem: Fall Injury Risk  Goal: Absence of Fall and Fall-Related Injury  Outcome: Ongoing, Progressing     Problem: Device-Related Complication Risk (Hemodialysis)  Goal: Safe, Effective Therapy Delivery  Outcome: Ongoing, Progressing     Problem: Hemodynamic Instability (Hemodialysis)  Goal: Effective Tissue Perfusion  Outcome: Ongoing, Progressing     Problem: Infection (Hemodialysis)  Goal: Absence of Infection Signs and Symptoms  Outcome: Ongoing, Progressing     Problem: Infection  Goal: Absence of Infection Signs and Symptoms  Outcome: Ongoing, Progressing     Problem: Oral Intake Inadequate  Goal: Improved Oral Intake  Outcome: Ongoing, Progressing     Problem: Impaired Wound Healing  Goal: Optimal Wound Healing  Outcome: Ongoing, Progressing     Problem: Fatigue  Goal: Improved Activity Tolerance  Outcome: Ongoing, Progressing

## 2023-07-23 NOTE — PLAN OF CARE
Problem: Adult Inpatient Plan of Care  Goal: Plan of Care Review  Outcome: Ongoing, Progressing     Problem: Skin Injury Risk Increased  Goal: Skin Health and Integrity  Outcome: Ongoing, Progressing     Problem: Fall Injury Risk  Goal: Absence of Fall and Fall-Related Injury  Outcome: Ongoing, Progressing     Problem: Impaired Wound Healing  Goal: Optimal Wound Healing  Outcome: Ongoing, Progressing     Problem: Fatigue  Goal: Improved Activity Tolerance  Outcome: Ongoing, Progressing

## 2023-07-23 NOTE — NURSING
Pt lying in bed. Repositioned pt. Family at bedside. Instructed pt to call for assistance.     [facility] [place]  Nurses Note -- 4 Eyes      7/22/2023       Skin assessed on: Q Shift      [x] No Pressure Injuries Present    [x]Prevention Measures Documented    [] Yes LDA  for Pressure Injury Previously documented     [] Yes New Pressure Injury Discovered   [] LDA for New Pressure Injury Added      Attending RN:  Janelle Nuñez RN     Second RN:  FELIPE Zepeda        Patent

## 2023-07-24 PROBLEM — K64.9 HEMORRHOID: Status: ACTIVE | Noted: 2023-07-24

## 2023-07-24 LAB
ANION GAP SERPL CALC-SCNC: 15 MMOL/L (ref 8–16)
BUN SERPL-MCNC: 43 MG/DL (ref 6–20)
CALCIUM SERPL-MCNC: 9.7 MG/DL (ref 8.7–10.5)
CHLORIDE SERPL-SCNC: 102 MMOL/L (ref 95–110)
CO2 SERPL-SCNC: 20 MMOL/L (ref 23–29)
CREAT SERPL-MCNC: 4.8 MG/DL (ref 0.5–1.4)
EST. GFR  (NO RACE VARIABLE): 10 ML/MIN/1.73 M^2
GLUCOSE SERPL-MCNC: 83 MG/DL (ref 70–110)
POTASSIUM SERPL-SCNC: 4 MMOL/L (ref 3.5–5.1)
SODIUM SERPL-SCNC: 137 MMOL/L (ref 136–145)

## 2023-07-24 PROCEDURE — 99024 PR POST-OP FOLLOW-UP VISIT: ICD-10-PCS | Mod: ,,, | Performed by: SURGERY

## 2023-07-24 PROCEDURE — 11000001 HC ACUTE MED/SURG PRIVATE ROOM

## 2023-07-24 PROCEDURE — 25000003 PHARM REV CODE 250: Performed by: HOSPITALIST

## 2023-07-24 PROCEDURE — 63600175 PHARM REV CODE 636 W HCPCS: Performed by: HOSPITALIST

## 2023-07-24 PROCEDURE — 25000003 PHARM REV CODE 250: Performed by: PHYSICIAN ASSISTANT

## 2023-07-24 PROCEDURE — 36415 COLL VENOUS BLD VENIPUNCTURE: CPT | Performed by: INTERNAL MEDICINE

## 2023-07-24 PROCEDURE — 25000003 PHARM REV CODE 250: Performed by: INTERNAL MEDICINE

## 2023-07-24 PROCEDURE — 99024 POSTOP FOLLOW-UP VISIT: CPT | Mod: ,,, | Performed by: SURGERY

## 2023-07-24 PROCEDURE — 25500020 PHARM REV CODE 255: Performed by: HOSPITALIST

## 2023-07-24 PROCEDURE — 80048 BASIC METABOLIC PNL TOTAL CA: CPT | Performed by: INTERNAL MEDICINE

## 2023-07-24 RX ORDER — LOPERAMIDE HYDROCHLORIDE 2 MG/1
2 CAPSULE ORAL 4 TIMES DAILY PRN
Status: DISCONTINUED | OUTPATIENT
Start: 2023-07-24 | End: 2023-07-29 | Stop reason: HOSPADM

## 2023-07-24 RX ADMIN — MUPIROCIN: 20 OINTMENT TOPICAL at 08:07

## 2023-07-24 RX ADMIN — SACUBITRIL AND VALSARTAN 1 TABLET: 24; 26 TABLET, FILM COATED ORAL at 08:07

## 2023-07-24 RX ADMIN — THERA TABS 1 TABLET: TAB at 08:07

## 2023-07-24 RX ADMIN — LOPERAMIDE HYDROCHLORIDE 2 MG: 2 CAPSULE ORAL at 03:07

## 2023-07-24 RX ADMIN — CEFEPIME 1 G: 1 INJECTION, POWDER, FOR SOLUTION INTRAMUSCULAR; INTRAVENOUS at 08:07

## 2023-07-24 RX ADMIN — METOPROLOL SUCCINATE 50 MG: 50 TABLET, EXTENDED RELEASE ORAL at 08:07

## 2023-07-24 RX ADMIN — LEVOTHYROXINE SODIUM 75 MCG: 0.03 TABLET ORAL at 08:07

## 2023-07-24 RX ADMIN — IOHEXOL 100 ML: 350 INJECTION, SOLUTION INTRAVENOUS at 03:07

## 2023-07-24 RX ADMIN — AMLODIPINE BESYLATE 10 MG: 5 TABLET ORAL at 08:07

## 2023-07-24 RX ADMIN — SACUBITRIL AND VALSARTAN 1 TABLET: 24; 26 TABLET, FILM COATED ORAL at 09:07

## 2023-07-24 RX ADMIN — APIXABAN 2.5 MG: 2.5 TABLET, FILM COATED ORAL at 08:07

## 2023-07-24 RX ADMIN — HYDRALAZINE HYDROCHLORIDE 100 MG: 25 TABLET, FILM COATED ORAL at 09:07

## 2023-07-24 RX ADMIN — HYDRALAZINE HYDROCHLORIDE 100 MG: 25 TABLET, FILM COATED ORAL at 08:07

## 2023-07-24 RX ADMIN — ACETAMINOPHEN 650 MG: 325 TABLET ORAL at 12:07

## 2023-07-24 NOTE — SUBJECTIVE & OBJECTIVE
Interval History: No new issues     Review of Systems   Constitutional:  Negative for activity change.   HENT:  Negative for congestion.    Respiratory:  Negative for chest tightness.    Cardiovascular:  Negative for chest pain.   Genitourinary:  Negative for difficulty urinating.   Objective:     Vital Signs (Most Recent):  Temp: 97.6 °F (36.4 °C) (07/24/23 0800)  Pulse: 76 (07/24/23 0800)  Resp: 15 (07/24/23 0800)  BP: (!) 148/67 (07/24/23 0800)  SpO2: 95 % (07/24/23 0800) Vital Signs (24h Range):  Temp:  [97.6 °F (36.4 °C)-98.7 °F (37.1 °C)] 97.6 °F (36.4 °C)  Pulse:  [] 76  Resp:  [15-20] 15  SpO2:  [94 %-95 %] 95 %  BP: (108-167)/(63-93) 148/67     Weight: 52.5 kg (115 lb 11.9 oz)  Body mass index is 19.87 kg/m².    Intake/Output Summary (Last 24 hours) at 7/24/2023 1036  Last data filed at 7/24/2023 0827  Gross per 24 hour   Intake 340 ml   Output 0 ml   Net 340 ml           Physical Exam  Constitutional:       Appearance: Normal appearance. She is not ill-appearing.   HENT:      Head: Atraumatic.   Cardiovascular:      Rate and Rhythm: Normal rate.   Pulmonary:      Effort: Pulmonary effort is normal. No respiratory distress.   Neurological:      Mental Status: She is alert.           Significant Labs: All pertinent labs within the past 24 hours have been reviewed.  CBC:   No results for input(s): WBC, HGB, HCT, PLT in the last 48 hours.    CMP:   Recent Labs   Lab 07/24/23  0730      K 4.0      CO2 20*   GLU 83   BUN 43*   CREATININE 4.8*   CALCIUM 9.7   ANIONGAP 15         Significant Imaging:

## 2023-07-24 NOTE — ASSESSMENT & PLAN NOTE
Presents with ulcer to left toe after mechanical injury 1 month ago. Afebrile without leukocytosis. X-ray without fracture or dislocation  Podiatry consulted  Continue rocephin/vanc  NUVIA noted.  Blood cultures pending,  podiatry is consulted  and no plan for intervention until PAD is addressed by vascular surgery,US arterial show severe  PAD,.plan for angiography this week .  On IV Abx,culture grow ,staph epi. and GNR.

## 2023-07-24 NOTE — ASSESSMENT & PLAN NOTE
Per GI patient has hemorrhoid,started on hydrocortisone,eliquis was  on hold at this time.HH is improved with pack of RBC.

## 2023-07-24 NOTE — ASSESSMENT & PLAN NOTE
Patient with Paroxysmal (<7 days) atrial fibrillation which is controlled currently with Beta Blocker.  Anticoagulation not indicated due to home eliquis held given anemia and Heme-positive stool in ED.  HH is stable,back on eliquis.

## 2023-07-24 NOTE — PROGRESS NOTES
[facility]  Progress Note    Patient Name: Eva Bloom  MRN: [id number]  Patient Class: IP- Inpatient   Admission Date: 7/19/2023  Length of Stay: 4 days  Attending Physician: José Miguel Cristina MD  Primary Care Provider: Sowmya Mccain MD        Subjective:     Principal Problem:Toe ulcer        HPI:  Eva Bloom 57 y.o. female with afib on eliquis, HTN, CHF, ESRD on HD T/TH/S, RCC presents to the hospital chief complaint of toe wound.  She reports 1 month an ulcer to left toe that occurred after a chair rolled over foot.  He is had increasing pain to the toe described as a throbbing.  She attempted treatment at home with ice without improvement.  She is dialyzed Tuesday Thursday Saturday at [facility].  She no longer makes urine.  She denies fever chest pain shortness breast nausea vomiting abdominal pain melena hematuria hematemesis dizziness syncope.    In the ED, afebrile without leukocytosis x-ray foot without fracture or dislocation potassium 4.3 lactic acid negative heme-positive stool.      Overview/Hospital Course:  Eva Bloom 57 y.o. female with afib on eliquis, HTN, CHF, ESRD on HD T/TH/S, RCC presents to the hospital chief complaint of toe wound.  She reports 1 month an ulcer to left toe that occurred after a chair rolled over foot.  He is had increasing pain to the toe described as a throbbing.  She attempted treatment at home with ice without improvement.  She is dialyzed Tuesday Thursday Saturday at [facility] in [place].  She no longer makes urine.  She denies fever chest pain shortness breast nausea vomiting abdominal pain melena hematuria hematemesis dizziness syncope.    In the ED, afebrile without leukocytosis x-ray foot without fracture or dislocation potassium 4.3 lactic acid negative heme-positive stool.podiatry is consulted  and no plan for intervention until PAD is addressed by vascular surgery,US arterial show bilateral 50%  stenosis,  S/P S/p L AVG excision and ligation.  TDC placed by IR ( following Vascular surgery) for new HD access  Per GI patient has hemorrhoid,started on hydrocortisone,eliquis was on hold at this time.HH is improved with pack of RBC.OAC is resumed  Possible leg angiography on Thursday,,CTA abdomen,pelvic with runn off in AM after that HD.      Interval History: No new issues     Review of Systems   Constitutional:  Negative for activity change.   HENT:  Negative for congestion.    Respiratory:  Negative for chest tightness.    Cardiovascular:  Negative for chest pain.   Genitourinary:  Negative for difficulty urinating.   Objective:     Vital Signs (Most Recent):  Temp: 97.6 °F (36.4 °C) (07/24/23 0800)  Pulse: 76 (07/24/23 0800)  Resp: 15 (07/24/23 0800)  BP: (!) 148/67 (07/24/23 0800)  SpO2: 95 % (07/24/23 0800) Vital Signs (24h Range):  Temp:  [97.6 °F (36.4 °C)-98.7 °F (37.1 °C)] 97.6 °F (36.4 °C)  Pulse:  [] 76  Resp:  [15-20] 15  SpO2:  [94 %-95 %] 95 %  BP: (108-167)/(63-93) 148/67     Weight: 52.5 kg (115 lb 11.9 oz)  Body mass index is 19.87 kg/m².    Intake/Output Summary (Last 24 hours) at 7/24/2023 1036  Last data filed at 7/24/2023 0827  Gross per 24 hour   Intake 340 ml   Output 0 ml   Net 340 ml           Physical Exam  Constitutional:       Appearance: Normal appearance. She is not ill-appearing.   HENT:      Head: Atraumatic.   Cardiovascular:      Rate and Rhythm: Normal rate.   Pulmonary:      Effort: Pulmonary effort is normal. No respiratory distress.   Neurological:      Mental Status: She is alert.           Significant Labs: All pertinent labs within the past 24 hours have been reviewed.  CBC:   No results for input(s): WBC, HGB, HCT, PLT in the last 48 hours.    CMP:   Recent Labs   Lab 07/24/23  0730      K 4.0      CO2 20*   GLU 83   BUN 43*   CREATININE 4.8*   CALCIUM 9.7   ANIONGAP 15         Significant Imaging:       Assessment/Plan:      * Toe ulcer  Presents with  ulcer to left toe after mechanical injury 1 month ago. Afebrile without leukocytosis. X-ray without fracture or dislocation  Podiatry consulted  Continue rocephin/vanc  NUVIA noted.  Blood cultures pending,  podiatry is consulted  and no plan for intervention until PAD is addressed by vascular surgery,US arterial show severe  PAD,.plan for angiography this week .  On IV Abx,culture grow ,staph epi. and GNR.      Hemorrhoid    Per GI patient has hemorrhoid,started on hydrocortisone,eliquis was  on hold at this time.HH is improved with pack of RBC.        Critical limb ischemia of left lower extremity with gangrene    On arterial US,  Vascular want do leg angiography this week.l.    Possible leg angiography in AM,CT abdomen,pelvic is pending.    Hypothyroidism  Lab Results   Component Value Date    TSH 15.273 (H) 07/10/2023     Resume home synthroid has not been taking outpatient    Aortic atherosclerosis  On medical therapy.      PAF (paroxysmal atrial fibrillation)  Patient with Paroxysmal (<7 days) atrial fibrillation which is controlled currently with Beta Blocker.  Anticoagulation not indicated due to home eliquis held given anemia and Heme-positive stool in ED.  HH is stable,back on eliquis.    ICD (implantable cardioverter-defibrillator) in place - SubQ  Stable denies discharge    Anemia in ESRD (end-stage renal disease)  Presents with hemoglobin of 6.2. previously 8 to 9 2 weeks ago. Denies active bleeding. Heme-positive in ED.   Type and screen and transfuse 1 unit  Will hold home eliquis as heme positive   GI consulted  Repeat CBC  HH is improved.    Chronic combined systolic and diastolic congestive heart failure  Patient is identified as having Combined Systolic and Diastolic heart failure that is Chronic. CHF is currently controlled. Latest ECHO performed and demonstrates- Results for orders placed during the hospital encounter of 05/08/23    Echo Saline Bubble? No    Interpretation Summary  · The left  ventricle is normal in size with mild eccentric hypertrophy and severely decreased systolic function.  · The estimated ejection fraction is 23%.  · There is severe left ventricular global hypokinesis.  · Grade II left ventricular diastolic dysfunction.  · Moderate left atrial enlargement.  · Normal right ventricular size with normal right ventricular systolic function.  · Moderate right atrial enlargement.  · There is mild aortic valve stenosis.  · Aortic valve area is 1.58 cm2; peak velocity is 2.01 m/s; mean gradient is 8 mmHg.  · Moderate mitral regurgitation.  · Moderate to severe tricuspid regurgitation.  · Mild pulmonic regurgitation.  · Elevated central venous pressure (15 mmHg).  · The estimated PA systolic pressure is 47 mmHg.  · There is mild-moderate pulmonary hypertension.  · Moderate posterolateral pericardial effusion. Trivial under the RA  . Continue Beta Blocker and ARNI and monitor clinical status closely. Monitor on telemetry. Patient is off CHF pathway.  Monitor strict Is&Os and daily weights.  Place on fluid restriction of 1 L. Cardiology has not been any consulted. Continue to stress to patient importance of self efficacy and  on diet for CHF. Last BNP reviewed- and noted below No results for input(s): BNP, BNPTRIAGEBLO in the last 168 hours..    ESRD (end stage renal disease) on dialysis  Nephrology consulted for HD,  AVG malfunction,    S/P S/p L AVG excision and ligation.  TDC placed by IR ( following Vascular surgery) for new HD access      Pulmonary hypertension  Fluid removal with HD.      History of kidney cancer  Followed by oncology outpatient. Home cabometyx held for last week due to diarrhea. C diff negative 7/2023 though previously positive. Continue outpatient oncology follow up    Essential hypertension  Hypotensive on arrival. Home hydralazine, held. Continue home metoprolol with hold parameters for HR and BP.       VTE Risk Mitigation (From admission, onward)            Ordered     apixaban tablet 2.5 mg  2 times daily         07/22/23 1718     IP VTE HIGH RISK PATIENT  Once         07/19/23 1747     Place sequential compression device  Until discontinued         07/19/23 1747     Place PING hose  Until discontinued         07/19/23 1747                    Discharge Planning   MAVIS:      Code Status: Full Code   Is the patient medically ready for discharge?:     Reason for patient still in hospital (select all that apply): Patient trending condition  Discharge Plan A: Home with family                  José Miguel Cristina MD  Department of Hospital Medicine   [facility]

## 2023-07-24 NOTE — PLAN OF CARE
Case Management Re-assessment      PCP: Sowmya Mccain  Pharmacy: [facility] on [address]     Patient Arrived From: home  Existing Help at Home: spouse     Barriers to Discharge: none      Discharge Plan:               A. Resume [facility]              B. Home with family    Plan for angio in the am and HD in the afternoon  after her procedure. Patient receives  dialysis at [facility] in [place] on Tues, Thurs and Sat. Patient also receives HH services through [facility] and would like to resume upon discharge. [place] to continue to follow for dc needs.    07/24/23 1436   Discharge Reassessment   Assessment Type Discharge Planning Reassessment   Did the patient's condition or plan change since previous assessment? Yes   Discharge Plan discussed with: Patient   Communicated MAVIS with patient/caregiver Date not available/Unable to determine   DME Needed Upon Discharge  none   Transition of Care Barriers None   Why the patient remains in the hospital Requires continued medical care   Post-Acute Status   Discharge Delays None known at this time

## 2023-07-24 NOTE — PROGRESS NOTES
[facility]  Vascular Surgery  Progress Note    Patient Name: Eva Bloom  MRN: [id number]  Admission Date: 7/19/2023  Primary Care Provider: Sowmya Mccain MD    Subjective:     Interval History: excised AVG cx positive for GNR, remaining margins of graft NoGrowth.  Pt labs stable, getting HD via newly placed L TDC.  L toe ulcer stable    Post-Op Info:  Procedure(s) (LRB):  LEFT ARM ARTERIOVENOUS GRAFT EXCISION  AND LIGATION (Left)   3 Days Post-Op      Medications:  Continuous Infusions:  Scheduled Meds:   amLODIPine  10 mg Oral Daily    apixaban  2.5 mg Oral BID    cabozantinib  60 mg Oral Daily    ceFEPime (MAXIPIME) IVPB  1 g Intravenous Q24H    cloNIDine 0.3 mg/24 hr td ptwk  1 patch Transdermal Q7 Days    epoetin david-epbx  6,000 Units Intravenous Every Tues, Thurs, Sat    hydrALAZINE  100 mg Oral Q12H    hydrocortisone   Rectal BID    levothyroxine  75 mcg Oral Daily    metoprolol succinate  50 mg Oral Daily    multivitamin  1 tablet Oral Daily    mupirocin   Nasal BID    sacubitriL-valsartan  1 tablet Oral BID     PRN Meds:sodium chloride, sodium chloride 0.9%, acetaminophen, dextrose 50%, dextrose 50%, glucagon (human recombinant), glucose, glucose, HYDROcodone-acetaminophen, magnesium oxide, magnesium oxide, melatonin, naloxone, ondansetron, senna-docusate 8.6-50 mg, sodium chloride 0.9%, sodium chloride 0.9%, Pharmacy to dose Vancomycin consult **AND** vancomycin - pharmacy to dose     Objective:     Vital Signs (Most Recent):  Temp: 97.6 °F (36.4 °C) (07/24/23 1128)  Pulse: 68 (07/24/23 1128)  Resp: 14 (07/24/23 1128)  BP: 113/64 (07/24/23 1128)  SpO2: 96 % (07/24/23 1128) Vital Signs (24h Range):  Temp:  [97.6 °F (36.4 °C)-98.7 °F (37.1 °C)] 97.6 °F (36.4 °C)  Pulse:  [68-89] 68  Resp:  [14-20] 14  SpO2:  [94 %-96 %] 96 %  BP: (108-167)/(63-88) 113/64     Date 07/24/23 0700 - 07/25/23 0659   Shift 3623-6877 2427-7442 6783-0285 24 Hour Total   INTAKE   P.O. 240   240   Shift Total(mL/kg)  240(4.6)   240(4.6)   OUTPUT   Shift Total(mL/kg)       Weight (kg) 52.5 52.5 52.5 52.5       Physical Exam  Palpable fem pulses     Significant Labs:  CBC: No results for input(s): WBC, RBC, HGB, HCT, PLT, MCV, MCH, MCHC in the last 48 hours.  CMP:   Recent Labs   Lab 07/24/23  0730   GLU 83   CALCIUM 9.7      K 4.0   CO2 20*      BUN 43*   CREATININE 4.8*     Coagulation: No results for input(s): LABPROT, INR, APTT in the last 48 hours.  eGFR: No results for input(s): EGFRIFAFRICA, EGFRCYSTC, LABGLOM in the last 48 hours.    Invalid input(s): EGFRNON-AA  Hemoglobin: No results for input(s): HGBA1C in the last 48 hours.    Significant Diagnostics:  I reviewed LE duplex and past CT scans of abdomen from earlier this month    Assessment/Plan:     Active Diagnoses:    Diagnosis Date Noted POA    PRINCIPAL PROBLEM:  Toe ulcer [L97.509] 07/19/2023 Yes    Hemorrhoid [K64.9] 07/24/2023 Yes    Critical limb ischemia of left lower extremity with gangrene [I70.262] 07/20/2023 Yes    Hypothyroidism [E03.9] 07/19/2023 Yes    Aortic atherosclerosis [I70.0] 07/06/2023 Yes    PAF (paroxysmal atrial fibrillation) [I48.0] 07/25/2022 Yes    ICD (implantable cardioverter-defibrillator) in place - SubQ [Z95.810] 07/15/2021 Yes    Anemia in ESRD (end-stage renal disease) [N18.6, D63.1]  Yes    Chronic combined systolic and diastolic congestive heart failure [I50.42]  Yes    ESRD (end stage renal disease) on dialysis [N18.6, Z99.2]  Not Applicable    Pulmonary hypertension [I27.20] 07/21/2017 Yes    History of kidney cancer [Z85.528] 09/02/2016 Not Applicable    Essential hypertension [I10] 09/23/2015 Yes      Problems Resolved During this Admission:     57 M hx of ESRD presenting with chronic  L foot toe ulcer and found to have infected L AVG s/p resection and ligation.  Arm is healing well cultures indicating no infection of remaining AVG stumps.  Will defer to ID for further abx course based on exposed graft cx.      Plan  for angio tmrw AM to address L toe ulcer  NPO midnight , hold eliquis  Pt is first case, will need to remain flat in bed for 2 hours post procedure  Please plan for PM HD session tmrw after her angiogram  Can cancel CTA, but please obtain NUVIA/TBI today prior to procedure.      Obi Werner MD  Vascular Surgery  [facility] [place] [facility]

## 2023-07-24 NOTE — ASSESSMENT & PLAN NOTE
Hypotensive on arrival. Home hydralazine, held. Continue home metoprolol with hold parameters for HR and BP.

## 2023-07-24 NOTE — PROGRESS NOTES
Vancomycin consult follow-up:    Patient reviewed, dialysis scheduled every Tues, Thurs, Sat, no new levels, no dose today; Next levels due: 7/25/2023 at 0600

## 2023-07-24 NOTE — NURSING
[facility] - [place], [place]  Nurses Note -- 4 Eyes      7/24/2023       Skin assessed on: Q Shift      [] No Pressure Injuries Present    []Prevention Measures Documented    [x] Yes LDA  for Pressure Injury Previously documented     [] Yes New Pressure Injury Discovered   [] LDA for New Pressure Injury Added      Attending RN:  Saundra Bowen RN     Second RN:  Katelyn WANG

## 2023-07-24 NOTE — ASSESSMENT & PLAN NOTE
Nephrology consulted for HD,  AVG malfunction,    S/P S/p L AVG excision and ligation.  TDC placed by IR ( following Vascular surgery) for new HD access

## 2023-07-24 NOTE — ASSESSMENT & PLAN NOTE
On arterial US,  Vascular want do leg angiography this week.l.    Possible leg angiography in AM,CT abdomen,pelvic is pending.

## 2023-07-24 NOTE — PROGRESS NOTES
Renal Progress Note    Date of Admission:  7/19/2023  3:06 PM    Length of Stay: 4  Days    Subjective: n/a    Objective:    Current Facility-Administered Medications   Medication    0.9%  NaCl infusion (for blood administration)    0.9%  NaCl infusion    acetaminophen tablet 650 mg    amLODIPine tablet 10 mg    apixaban tablet 2.5 mg    cabozantinib Tab 60 mg    ceFEPIme (MAXIPIME) 1 g in dextrose 5 % in water (D5W) 5 % 100 mL IVPB (MB+)    cloNIDine 0.3 mg/24 hr td ptwk 1 patch    dextrose 50% injection 12.5 g    dextrose 50% injection 25 g    epoetin david-epbx injection 6,000 Units    glucagon (human recombinant) injection 1 mg    glucose chewable tablet 16 g    glucose chewable tablet 24 g    hydrALAZINE tablet 100 mg    HYDROcodone-acetaminophen 5-325 mg per tablet 1 tablet    hydrocortisone 2.5 % rectal cream    levothyroxine tablet 75 mcg    magnesium oxide tablet 800 mg    magnesium oxide tablet 800 mg    melatonin tablet 6 mg    metoprolol succinate (TOPROL-XL) 24 hr tablet 50 mg    multivitamin tablet    mupirocin 2 % ointment    naloxone 0.4 mg/mL injection 0.02 mg    ondansetron injection 8 mg    sacubitriL-valsartan 24-26 mg per tablet 1 tablet    senna-docusate 8.6-50 mg per tablet 1 tablet    sodium chloride 0.9% bolus 250 mL 250 mL    sodium chloride 0.9% flush 10 mL    vancomycin - pharmacy to dose     Facility-Administered Medications Ordered in Other Encounters   Medication    0.9%  NaCl infusion       Vitals:    07/24/23 0003 07/24/23 0427 07/24/23 0800 07/24/23 1128   BP:  108/63 (!) 148/67 113/64   BP Location:  Right arm Right arm Right arm   Patient Position:  Lying Sitting Lying   Pulse:  72 76 68   Resp:  18 15 14   Temp: 98.7 °F (37.1 °C) 98.7 °F (37.1 °C) 97.6 °F (36.4 °C) 97.6 °F (36.4 °C)   TempSrc:  Axillary Oral Oral   SpO2:  95% 95% 96%   Weight:       Height:           I/O last 3 completed shifts:  In: 340 [P.O.:240; I.V.:100]  Out: 225 [Emesis/NG  output:225]  I/O this shift:  In: 240 [P.O.:240]  Out: -       Physical Exam:     General: NAD  Neck: supple  Heart: RRR  Lungs: unlabored breathing  Abdomen: n/a  Limbs: n/a  Neurologic: Asleep      Laboratories:    No results for input(s): WBC, RBC, HGB, HCT, PLT, MCV, MCH, MCHC in the last 24 hours.    Recent Labs   Lab 07/24/23  0730   CALCIUM 9.7      K 4.0   CO2 20*      BUN 43*   CREATININE 4.8*       No results for input(s): COLORU, CLARITYU, SPECGRAV, PHUR, PROTEINUA, GLUCOSEU, BLOODU, WBCU, RBCU, BACTERIA, MUCUS in the last 24 hours.    Invalid input(s):  BILIRUBINCON    Microbiology Results (last 7 days)       Procedure Component Value Units Date/Time    Aerobic culture [[id number]]  (Abnormal)  (Susceptibility) Collected: 07/21/23 1400    Order Status: Completed Specimen: Incision site from Arm, Left Updated: 07/24/23 0710     Aerobic Bacterial Culture GRAM NEGATIVE GARRET, NON-LACTOSE   Many  Identification and susceptibility pending        STAPHYLOCOCCUS EPIDERMIDIS  Many      Narrative:      exposed left arm AV graft    Aerobic culture [[id number]] Collected: 07/21/23 1300    Order Status: Completed Specimen: Incision site from Arm, Left Updated: 07/24/23 0702     Aerobic Bacterial Culture No growth    Narrative:      left arm AV graft (venous end)    Aerobic culture [[id number]] Collected: 07/21/23 1300    Order Status: Completed Specimen: Incision site from Arm, Left Updated: 07/24/23 0701     Aerobic Bacterial Culture No growth    Narrative:      left arm AV graft (arterial end)    Aerobic culture [[id number]] Collected: 07/21/23 1300    Order Status: Completed Specimen: Incision site from Arm, Left Updated: 07/24/23 0700     Aerobic Bacterial Culture No growth    Narrative:      distal sapphire-graft fluid venous end  (x2 swabs @ same site)    Aerobic culture [[id number]] Collected: 07/21/23 1300    Order Status: Completed Specimen: Incision site from Arm, Left Updated: 07/24/23 0700      Aerobic Bacterial Culture No growth    Narrative:      proximal sapphire-graft fluid arterial end  (x2 swabs @ same site)    AFB Culture & Smear [[id number]] Collected: 07/21/23 1300    Order Status: Completed Specimen: Incision site from Arm, Left Updated: 07/23/23 2127     AFB Culture & Smear Culture in progress    Narrative:      proximal sapphire-graft fluid arterial end  (x2 swabs @ same site)    AFB Culture & Smear [[id number]] Collected: 07/21/23 1300    Order Status: Completed Specimen: Incision site from Arm, Left Updated: 07/23/23 2127     AFB Culture & Smear Culture in progress    Narrative:      distal sapphire-graft fluid venous end  (x2 swabs @ same site)    AFB Culture & Smear [[id number]] Collected: 07/21/23 1300    Order Status: Completed Specimen: Incision site from Arm, Left Updated: 07/23/23 2127     AFB Culture & Smear Culture in progress    Narrative:      left arm AV graft (venous end)    AFB Culture & Smear [[id number]] Collected: 07/21/23 1400    Order Status: Completed Specimen: Incision site from Arm, Left Updated: 07/23/23 2127     AFB Culture & Smear Culture in progress    Narrative:      exposed left arm AV graft    AFB Culture & Smear [[id number]] Collected: 07/21/23 1300    Order Status: Completed Specimen: Incision site from Arm, Left Updated: 07/23/23 2127     AFB Culture & Smear Culture in progress    Narrative:      left arm AV graft (arterial end)    Blood culture x two cultures. Draw prior to antibiotics. [[id number]] Collected: 07/19/23 1602    Order Status: Completed Specimen: Blood from Peripheral, Forearm, Right Updated: 07/23/23 1703     Blood Culture, Routine No Growth after 4 days.    Narrative:      Aerobic and anaerobic    Blood culture x two cultures. Draw prior to antibiotics. [[id number]] Collected: 07/19/23 1547    Order Status: Completed Specimen: Blood from Peripheral, Forearm, Right Updated: 07/23/23 1703     Blood Culture, Routine No Growth after 4 days.    Narrative:      Aerobic  and anaerobic    Culture, Anaerobe [[id number]] Collected: 07/21/23 1400    Order Status: Completed Specimen: Incision site from Arm, Left Updated: 07/23/23 0646     Anaerobic Culture Culture in progress    Narrative:      exposed left arm AV graft    Culture, Anaerobe [[id number]] Collected: 07/21/23 1300    Order Status: Completed Specimen: Incision site from Arm, Left Updated: 07/23/23 0643     Anaerobic Culture Culture in progress    Narrative:      left arm AV graft (venous end)    Culture, Anaerobe [[id number]] Collected: 07/21/23 1300    Order Status: Completed Specimen: Incision site from Arm, Left Updated: 07/23/23 0641     Anaerobic Culture Culture in progress    Narrative:      left arm AV graft (arterial end)    Culture, Anaerobe [[id number]] Collected: 07/21/23 1300    Order Status: Completed Specimen: Incision site from Arm, Left Updated: 07/23/23 0641     Anaerobic Culture Culture in progress    Narrative:      distal sapphire-graft fluid venous end  (x2 swabs @ same site)    Culture, Anaerobe [[id number]] Collected: 07/21/23 1300    Order Status: Completed Specimen: Incision site from Arm, Left Updated: 07/23/23 0640     Anaerobic Culture Culture in progress    Narrative:      proximal sapphire-graft fluid arterial end  (x2 swabs @ same site)    Aerobic culture (Specify Source) **CANNOT BE ORDERED AS STAT** [[id number]]  (Abnormal)  (Susceptibility) Collected: 07/20/23 0528    Order Status: Completed Specimen: Wound from Toe, Left Foot Updated: 07/22/23 1301     Aerobic Bacterial Culture STAPHYLOCOCCUS EPIDERMIDIS  Many      Narrative:      Left middle toe    Gram stain [[id number]] Collected: 07/21/23 1400    Order Status: Completed Specimen: Incision site from Arm, Left Updated: 07/22/23 1117     Gram Stain Result Few WBC's      Moderate Gram positive cocci      Rare Gram negative rods    Narrative:      exposed left arm AV graft    Gram stain [[id number]] Collected: 07/21/23 1300    Order Status: Completed  Specimen: Incision site from Arm, Left Updated: 07/22/23 1114     Gram Stain Result Few WBC's      No organisms seen    Narrative:      left arm AV graft (venous end)    Gram stain [[id number]] Collected: 07/21/23 1300    Order Status: Completed Specimen: Incision site from Arm, Left Updated: 07/22/23 1112     Gram Stain Result Few WBC's      No organisms seen    Narrative:      left arm AV graft (arterial end)    Gram stain [[id number]] Collected: 07/21/23 1300    Order Status: Completed Specimen: Incision site from Arm, Left Updated: 07/22/23 1058     Gram Stain Result Rare WBC's      No organisms seen    Narrative:      distal sapphire-graft fluid venous end  (x2 swabs @ same site)    Gram stain [[id number]] Collected: 07/21/23 1300    Order Status: Completed Specimen: Incision site from Arm, Left Updated: 07/22/23 1057     Gram Stain Result Rare WBC's      No organisms seen    Narrative:      proximal sapphire-graft fluid arterial end  (x2 swabs @ same site)    Fungus culture [[id number]] Collected: 07/21/23 1300    Order Status: Sent Specimen: Incision site from Arm, Left Updated: 07/21/23 1748    Fungus culture [[id number]] Collected: 07/21/23 1300    Order Status: Sent Specimen: Incision site from Arm, Left Updated: 07/21/23 1722    Fungus culture [[id number]] Collected: 07/21/23 1400    Order Status: Sent Specimen: Incision site from Arm, Left Updated: 07/21/23 1717    Fungus culture [[id number]] Collected: 07/21/23 1300    Order Status: Sent Specimen: Incision site from Arm, Left Updated: 07/21/23 1712    Fungus culture [[id number]] Collected: 07/21/23 1300    Order Status: Sent Specimen: Incision site from Arm, Left Updated: 07/21/23 1706    Culture, Anaerobe [[id number]] Collected: 07/21/23 1300    Order Status: Canceled Specimen: Incision site from Arm, Left     Aerobic culture [[id number]] Collected: 07/21/23 1300    Order Status: Canceled Specimen: Incision site from Arm, Left     Fungus culture [[id number]] Collected:  07/21/23 1300    Order Status: Canceled Specimen: Incision site from Arm, Left     AFB Culture & Smear [[id number]] Collected: 07/21/23 1300    Order Status: Canceled Specimen: Incision site from Arm, Left     Gram stain [[id number]] Collected: 07/21/23 1300    Order Status: Canceled Specimen: Incision site from Arm, Left               Diagnostic Tests: n/a        Assessment:    56 y/o female with ESRD on HD admitted with:    - L-Toe ulcer  - PAD LLE with gangrene  - Chronic combined HF  - PAF  - Hx. AICD  - Hx. Pulmonary HTN  - Anemia of ckd  - HTN  - AV access malfunction s/p excision and THDC placement              Plan:    - For LE angio in a.m. per Vascular  - Antibiotics per admitting  - Dialysis Q TTS  - Epogen 3 x week  - Renal diet + protein supplements  - Wound care per Podiatry  - Other problems per admitting

## 2023-07-25 LAB
ALLENS TEST: ABNORMAL
BACTERIA SPEC AEROBE CULT: NO GROWTH
BACTERIA SPEC ANAEROBE CULT: NORMAL
BASOPHILS # BLD AUTO: 0.04 K/UL (ref 0–0.2)
BASOPHILS NFR BLD: 0.6 % (ref 0–1.9)
DIFFERENTIAL METHOD: ABNORMAL
EOSINOPHIL # BLD AUTO: 0.3 K/UL (ref 0–0.5)
EOSINOPHIL NFR BLD: 4.7 % (ref 0–8)
ERYTHROCYTE [DISTWIDTH] IN BLOOD BY AUTOMATED COUNT: 19.3 % (ref 11.5–14.5)
HCT VFR BLD AUTO: 27.9 % (ref 37–48.5)
HGB BLD-MCNC: 8.9 G/DL (ref 12–16)
IMM GRANULOCYTES # BLD AUTO: 0.04 K/UL (ref 0–0.04)
IMM GRANULOCYTES NFR BLD AUTO: 0.6 % (ref 0–0.5)
LYMPHOCYTES # BLD AUTO: 0.7 K/UL (ref 1–4.8)
LYMPHOCYTES NFR BLD: 11.9 % (ref 18–48)
MCH RBC QN AUTO: 32.1 PG (ref 27–31)
MCHC RBC AUTO-ENTMCNC: 31.9 G/DL (ref 32–36)
MCV RBC AUTO: 101 FL (ref 82–98)
MONOCYTES # BLD AUTO: 0.8 K/UL (ref 0.3–1)
MONOCYTES NFR BLD: 12.1 % (ref 4–15)
NEUTROPHILS # BLD AUTO: 4.4 K/UL (ref 1.8–7.7)
NEUTROPHILS NFR BLD: 70.1 % (ref 38–73)
NRBC BLD-RTO: 1 /100 WBC
PLATELET # BLD AUTO: 211 K/UL (ref 150–450)
PMV BLD AUTO: 9.9 FL (ref 9.2–12.9)
POC ACTIVATED CLOTTING TIME K: 203 SEC (ref 74–137)
POC ACTIVATED CLOTTING TIME K: 215 SEC (ref 74–137)
POC ACTIVATED CLOTTING TIME K: 251 SEC (ref 74–137)
POC ACTIVATED CLOTTING TIME K: 317 SEC (ref 74–137)
POCT GLUCOSE: 81 MG/DL (ref 70–110)
RBC # BLD AUTO: 2.77 M/UL (ref 4–5.4)
SAMPLE: ABNORMAL
SITE: ABNORMAL
VANCOMYCIN SERPL-MCNC: 12 UG/ML
WBC # BLD AUTO: 6.22 K/UL (ref 3.9–12.7)

## 2023-07-25 PROCEDURE — 37232 PR REVASCULARIZE TIBIAL/PERON ARTERY,ANGIOPLASTY EA ADD: CPT | Mod: 79,LT,, | Performed by: SURGERY

## 2023-07-25 PROCEDURE — 85025 COMPLETE CBC W/AUTO DIFF WBC: CPT | Performed by: HOSPITALIST

## 2023-07-25 PROCEDURE — 76942 ECHO GUIDE FOR BIOPSY: CPT | Performed by: ANESTHESIOLOGY

## 2023-07-25 PROCEDURE — 63600175 PHARM REV CODE 636 W HCPCS: Performed by: PHYSICIAN ASSISTANT

## 2023-07-25 PROCEDURE — 11000001 HC ACUTE MED/SURG PRIVATE ROOM

## 2023-07-25 PROCEDURE — 25000003 PHARM REV CODE 250: Performed by: PHYSICIAN ASSISTANT

## 2023-07-25 PROCEDURE — 85347 COAGULATION TIME ACTIVATED: CPT

## 2023-07-25 PROCEDURE — 80100016 HC MAINTENANCE HEMODIALYSIS

## 2023-07-25 PROCEDURE — 90935 HEMODIALYSIS ONE EVALUATION: CPT

## 2023-07-25 PROCEDURE — 75710 ARTERY X-RAYS ARM/LEG: CPT | Performed by: SURGERY

## 2023-07-25 PROCEDURE — 63600175 PHARM REV CODE 636 W HCPCS: Performed by: INTERNAL MEDICINE

## 2023-07-25 PROCEDURE — 75710 PR  ANGIO EXTREMITY UNILAT: ICD-10-PCS | Mod: 26,59,, | Performed by: SURGERY

## 2023-07-25 PROCEDURE — 99024 POSTOP FOLLOW-UP VISIT: CPT | Mod: ,,, | Performed by: SURGERY

## 2023-07-25 PROCEDURE — 63600175 PHARM REV CODE 636 W HCPCS: Performed by: STUDENT IN AN ORGANIZED HEALTH CARE EDUCATION/TRAINING PROGRAM

## 2023-07-25 PROCEDURE — 37232 HC TIB/PER REVASC ADD-ON: CPT | Performed by: SURGERY

## 2023-07-25 PROCEDURE — 37228 PR TIB/PER REVASC W/TLA: ICD-10-PCS | Mod: 79,LT,, | Performed by: SURGERY

## 2023-07-25 PROCEDURE — 75710 ARTERY X-RAYS ARM/LEG: CPT | Mod: 26,59,, | Performed by: SURGERY

## 2023-07-25 PROCEDURE — 99152 PR MOD CONSCIOUS SEDATION, SAME PHYS, 5+ YRS, FIRST 15 MIN: ICD-10-PCS | Mod: ,,, | Performed by: SURGERY

## 2023-07-25 PROCEDURE — 63600175 PHARM REV CODE 636 W HCPCS: Performed by: SURGERY

## 2023-07-25 PROCEDURE — 37232 PR REVASCULARIZE TIBIAL/PERON ARTERY,ANGIOPLASTY EA ADD: ICD-10-PCS | Mod: 79,LT,, | Performed by: SURGERY

## 2023-07-25 PROCEDURE — 25000003 PHARM REV CODE 250: Performed by: INTERNAL MEDICINE

## 2023-07-25 PROCEDURE — 99152 MOD SED SAME PHYS/QHP 5/>YRS: CPT | Mod: ,,, | Performed by: SURGERY

## 2023-07-25 PROCEDURE — 80202 ASSAY OF VANCOMYCIN: CPT | Performed by: STUDENT IN AN ORGANIZED HEALTH CARE EDUCATION/TRAINING PROGRAM

## 2023-07-25 PROCEDURE — 99024 PR POST-OP FOLLOW-UP VISIT: ICD-10-PCS | Mod: ,,, | Performed by: SURGERY

## 2023-07-25 PROCEDURE — 37228 HC TIB/PER REVASC W/TLA: CPT | Performed by: SURGERY

## 2023-07-25 PROCEDURE — 25000003 PHARM REV CODE 250: Performed by: SURGERY

## 2023-07-25 PROCEDURE — 63600175 PHARM REV CODE 636 W HCPCS: Performed by: HOSPITALIST

## 2023-07-25 PROCEDURE — 25500020 PHARM REV CODE 255: Performed by: STUDENT IN AN ORGANIZED HEALTH CARE EDUCATION/TRAINING PROGRAM

## 2023-07-25 PROCEDURE — 25000003 PHARM REV CODE 250: Performed by: STUDENT IN AN ORGANIZED HEALTH CARE EDUCATION/TRAINING PROGRAM

## 2023-07-25 PROCEDURE — 37228 PR TIB/PER REVASC W/TLA: CPT | Mod: 79,LT,, | Performed by: SURGERY

## 2023-07-25 PROCEDURE — 99900035 HC TECH TIME PER 15 MIN (STAT)

## 2023-07-25 PROCEDURE — 25000003 PHARM REV CODE 250: Performed by: HOSPITALIST

## 2023-07-25 RX ORDER — BUPIVACAINE HYDROCHLORIDE 5 MG/ML
INJECTION, SOLUTION EPIDURAL; INTRACAUDAL
Status: DISCONTINUED | OUTPATIENT
Start: 2023-07-21 | End: 2023-07-25

## 2023-07-25 RX ORDER — VERAPAMIL HYDROCHLORIDE 2.5 MG/ML
INJECTION, SOLUTION INTRAVENOUS
Status: COMPLETED | OUTPATIENT
Start: 2023-07-25 | End: 2023-07-25

## 2023-07-25 RX ORDER — MEROPENEM AND SODIUM CHLORIDE 1 G/50ML
1 INJECTION, SOLUTION INTRAVENOUS
Status: DISCONTINUED | OUTPATIENT
Start: 2023-07-25 | End: 2023-07-26

## 2023-07-25 RX ORDER — FENTANYL CITRATE 50 UG/ML
INJECTION, SOLUTION INTRAMUSCULAR; INTRAVENOUS
Status: COMPLETED | OUTPATIENT
Start: 2023-07-25 | End: 2023-07-25

## 2023-07-25 RX ORDER — NITROGLYCERIN 20 MG/100ML
INJECTION INTRAVENOUS
Status: COMPLETED | OUTPATIENT
Start: 2023-07-25 | End: 2023-07-25

## 2023-07-25 RX ORDER — HEPARIN SODIUM 1000 [USP'U]/ML
INJECTION, SOLUTION INTRAVENOUS; SUBCUTANEOUS
Status: COMPLETED | OUTPATIENT
Start: 2023-07-25 | End: 2023-07-25

## 2023-07-25 RX ORDER — MIDAZOLAM HYDROCHLORIDE 1 MG/ML
INJECTION INTRAMUSCULAR; INTRAVENOUS
Status: COMPLETED | OUTPATIENT
Start: 2023-07-25 | End: 2023-07-25

## 2023-07-25 RX ORDER — LABETALOL HYDROCHLORIDE 5 MG/ML
INJECTION, SOLUTION INTRAVENOUS
Status: COMPLETED | OUTPATIENT
Start: 2023-07-25 | End: 2023-07-25

## 2023-07-25 RX ORDER — PROTAMINE SULFATE 10 MG/ML
INJECTION, SOLUTION INTRAVENOUS
Status: COMPLETED | OUTPATIENT
Start: 2023-07-25 | End: 2023-07-25

## 2023-07-25 RX ORDER — HYDROMORPHONE HYDROCHLORIDE 1 MG/ML
1 INJECTION, SOLUTION INTRAMUSCULAR; INTRAVENOUS; SUBCUTANEOUS EVERY 6 HOURS PRN
Status: DISCONTINUED | OUTPATIENT
Start: 2023-07-25 | End: 2023-07-25

## 2023-07-25 RX ORDER — HYDROMORPHONE HYDROCHLORIDE 1 MG/ML
1 INJECTION, SOLUTION INTRAMUSCULAR; INTRAVENOUS; SUBCUTANEOUS EVERY 4 HOURS PRN
Status: DISCONTINUED | OUTPATIENT
Start: 2023-07-25 | End: 2023-07-29 | Stop reason: HOSPADM

## 2023-07-25 RX ORDER — MORPHINE SULFATE 4 MG/ML
4 INJECTION, SOLUTION INTRAMUSCULAR; INTRAVENOUS ONCE
Status: COMPLETED | OUTPATIENT
Start: 2023-07-25 | End: 2023-07-25

## 2023-07-25 RX ADMIN — MIDAZOLAM HYDROCHLORIDE 0.5 MG: 1 INJECTION, SOLUTION INTRAMUSCULAR; INTRAVENOUS at 09:07

## 2023-07-25 RX ADMIN — FENTANYL CITRATE 25 MCG: 50 INJECTION INTRAMUSCULAR; INTRAVENOUS at 10:07

## 2023-07-25 RX ADMIN — MIDAZOLAM HYDROCHLORIDE 1 MG: 1 INJECTION, SOLUTION INTRAMUSCULAR; INTRAVENOUS at 08:07

## 2023-07-25 RX ADMIN — NITROGLYCERIN 200 MCG: 20 INJECTION INTRAVENOUS at 10:07

## 2023-07-25 RX ADMIN — LEVOTHYROXINE SODIUM 75 MCG: 0.03 TABLET ORAL at 06:07

## 2023-07-25 RX ADMIN — METOPROLOL SUCCINATE 50 MG: 50 TABLET, EXTENDED RELEASE ORAL at 06:07

## 2023-07-25 RX ADMIN — PROTAMINE SULFATE 45 MG: 10 INJECTION, SOLUTION INTRAVENOUS at 01:07

## 2023-07-25 RX ADMIN — FENTANYL CITRATE 25 MCG: 50 INJECTION INTRAMUSCULAR; INTRAVENOUS at 09:07

## 2023-07-25 RX ADMIN — HEPARIN SODIUM 5000 UNITS: 1000 INJECTION, SOLUTION INTRAVENOUS; SUBCUTANEOUS at 09:07

## 2023-07-25 RX ADMIN — MIDAZOLAM HYDROCHLORIDE 0.5 MG: 1 INJECTION, SOLUTION INTRAMUSCULAR; INTRAVENOUS at 10:07

## 2023-07-25 RX ADMIN — FENTANYL CITRATE 25 MCG: 50 INJECTION INTRAMUSCULAR; INTRAVENOUS at 11:07

## 2023-07-25 RX ADMIN — AMLODIPINE BESYLATE 10 MG: 5 TABLET ORAL at 06:07

## 2023-07-25 RX ADMIN — MEROPENEM AND SODIUM CHLORIDE 1 G: 1 INJECTION, SOLUTION INTRAVENOUS at 06:07

## 2023-07-25 RX ADMIN — NALXONE HYDROCHLORIDE 0.02 MG: 0.4 INJECTION INTRAMUSCULAR; INTRAVENOUS; SUBCUTANEOUS at 10:07

## 2023-07-25 RX ADMIN — PROTAMINE SULFATE 5 MG: 10 INJECTION, SOLUTION INTRAVENOUS at 01:07

## 2023-07-25 RX ADMIN — HYDROMORPHONE HYDROCHLORIDE 1 MG: 1 INJECTION, SOLUTION INTRAMUSCULAR; INTRAVENOUS; SUBCUTANEOUS at 06:07

## 2023-07-25 RX ADMIN — HEPARIN SODIUM 1000 UNITS: 1000 INJECTION, SOLUTION INTRAVENOUS; SUBCUTANEOUS at 11:07

## 2023-07-25 RX ADMIN — IOHEXOL 75 ML: 300 INJECTION, SOLUTION INTRAVENOUS at 01:07

## 2023-07-25 RX ADMIN — FENTANYL CITRATE 50 MCG: 50 INJECTION INTRAMUSCULAR; INTRAVENOUS at 08:07

## 2023-07-25 RX ADMIN — HYDROMORPHONE HYDROCHLORIDE 1 MG: 1 INJECTION, SOLUTION INTRAMUSCULAR; INTRAVENOUS; SUBCUTANEOUS at 03:07

## 2023-07-25 RX ADMIN — VANCOMYCIN HYDROCHLORIDE 500 MG: 500 INJECTION, POWDER, LYOPHILIZED, FOR SOLUTION INTRAVENOUS at 08:07

## 2023-07-25 RX ADMIN — EPOETIN ALFA-EPBX 6000 UNITS: 10000 INJECTION, SOLUTION INTRAVENOUS; SUBCUTANEOUS at 06:07

## 2023-07-25 RX ADMIN — HYDRALAZINE HYDROCHLORIDE 100 MG: 25 TABLET, FILM COATED ORAL at 08:07

## 2023-07-25 RX ADMIN — SACUBITRIL AND VALSARTAN 1 TABLET: 24; 26 TABLET, FILM COATED ORAL at 06:07

## 2023-07-25 RX ADMIN — CEFEPIME 1 G: 1 INJECTION, POWDER, FOR SOLUTION INTRAMUSCULAR; INTRAVENOUS at 07:07

## 2023-07-25 RX ADMIN — LABETALOL HYDROCHLORIDE 10 MG: 5 INJECTION, SOLUTION INTRAVENOUS at 01:07

## 2023-07-25 RX ADMIN — MIDAZOLAM HYDROCHLORIDE 0.5 MG: 1 INJECTION, SOLUTION INTRAMUSCULAR; INTRAVENOUS at 12:07

## 2023-07-25 RX ADMIN — PROTAMINE SULFATE 10 MG: 10 INJECTION, SOLUTION INTRAVENOUS at 01:07

## 2023-07-25 RX ADMIN — MORPHINE SULFATE 4 MG: 4 INJECTION, SOLUTION INTRAMUSCULAR; INTRAVENOUS at 02:07

## 2023-07-25 RX ADMIN — MIDAZOLAM HYDROCHLORIDE 0.5 MG: 1 INJECTION, SOLUTION INTRAMUSCULAR; INTRAVENOUS at 11:07

## 2023-07-25 RX ADMIN — HEPARIN SODIUM 2000 UNITS: 1000 INJECTION, SOLUTION INTRAVENOUS; SUBCUTANEOUS at 10:07

## 2023-07-25 RX ADMIN — THERA TABS 1 TABLET: TAB at 06:07

## 2023-07-25 RX ADMIN — FENTANYL CITRATE 25 MCG: 50 INJECTION INTRAMUSCULAR; INTRAVENOUS at 12:07

## 2023-07-25 RX ADMIN — VERAPAMIL HYDROCHLORIDE 2.5 MG: 2.5 INJECTION, SOLUTION INTRAVENOUS at 10:07

## 2023-07-25 NOTE — ASSESSMENT & PLAN NOTE
Underwent excision/ligation of left AVG on 7/21  - cultures growing Achromobacter yxlosoxidans, staph epidermidis and yeast  - Change to meropenem and vancomycin  - ID consulted for recommendations  - treat yeast? No ID yet

## 2023-07-25 NOTE — PROGRESS NOTES
Pharmacokinetic Assessment Follow Up: IV Vancomycin    Vancomycin serum concentration assessment(s):    The random level was drawn correctly and can be used to guide therapy at this time. The measurement is within the desired definitive target range of 10 to 20 mcg/mL.    Vancomycin Regimen Plan:    Vancomycin 500mg scheduled post HD today.    Re-dose when the random level is less than 20 mcg/mL, next level to be drawn at 0400 on 7/27/23    Drug levels (last 3 results):  Recent Labs   Lab Result Units 07/25/23  0505   Vancomycin, Random ug/mL 12.0       Pharmacy will continue to follow and monitor vancomycin.    Please contact pharmacy at extension [phone] for questions regarding this assessment.    Thank you for the consult,   Miguel De Los Santos       Patient brief summary:  Eva Bloom is a 57 y.o. female initiated on antimicrobial therapy with IV Vancomycin for treatment of skin & soft tissue infection    The patient's current regimen is random pulse dosing    Drug Allergies:   Review of patient's allergies indicates:   Allergen Reactions    Coreg [carvedilol] Other (See Comments)     Nausea/vomiting    Allopurinol      Other reaction(s): abnormal transaminases       Actual Body Weight:   52.5 kg    Renal Function:   Estimated Creatinine Clearance: 10.6 mL/min (A) (based on SCr of 4.8 mg/dL (H)).,     Dialysis Method (if applicable):  intermittent HD    CBC (last 72 hours):  Recent Labs   Lab Result Units 07/25/23  0505   WBC K/uL 6.22   Hemoglobin g/dL 8.9*   Hematocrit % 27.9*   Platelets K/uL 211   Gran % % 70.1   Lymph % % 11.9*   Mono % % 12.1   Eosinophil % % 4.7   Basophil % % 0.6   Differential Method  Automated       Metabolic Panel (last 72 hours):  Recent Labs   Lab Result Units 07/24/23  0730   Sodium mmol/L 137   Potassium mmol/L 4.0   Chloride mmol/L 102   CO2 mmol/L 20*   Glucose mg/dL 83   BUN mg/dL 43*   Creatinine mg/dL 4.8*       Vancomycin Administrations:  vancomycin given in the last 96  hours                     vancomycin injection (g) 1 g Given 07/21/23 1300                    Microbiologic Results:  Microbiology Results (last 7 days)       Procedure Component Value Units Date/Time    AFB Culture & Smear [[id number]] Collected: 07/21/23 1400    Order Status: Completed Specimen: Incision site from Arm, Left Updated: 07/24/23 1703     AFB Culture & Smear Culture in progress     AFB CULTURE STAIN No acid fast bacilli seen.    Narrative:      exposed left arm AV graft    AFB Culture & Smear [[id number]] Collected: 07/21/23 1300    Order Status: Completed Specimen: Incision site from Arm, Left Updated: 07/24/23 1703     AFB Culture & Smear Culture in progress     AFB CULTURE STAIN No acid fast bacilli seen.    Narrative:      proximal sapphire-graft fluid arterial end  (x2 swabs @ same site)    AFB Culture & Smear [[id number]] Collected: 07/21/23 1300    Order Status: Completed Specimen: Incision site from Arm, Left Updated: 07/24/23 1703     AFB Culture & Smear Culture in progress     AFB CULTURE STAIN No acid fast bacilli seen.    Narrative:      left arm AV graft (venous end)    AFB Culture & Smear [[id number]] Collected: 07/21/23 1300    Order Status: Completed Specimen: Incision site from Arm, Left Updated: 07/24/23 1703     AFB Culture & Smear Culture in progress     AFB CULTURE STAIN No acid fast bacilli seen.    Narrative:      distal sapphire-graft fluid venous end  (x2 swabs @ same site)    AFB Culture & Smear [[id number]] Collected: 07/21/23 1300    Order Status: Completed Specimen: Incision site from Arm, Left Updated: 07/24/23 1703     AFB Culture & Smear Culture in progress     AFB CULTURE STAIN No acid fast bacilli seen.    Narrative:      left arm AV graft (arterial end)    Aerobic culture [[id number]]  (Abnormal)  (Susceptibility) Collected: 07/21/23 1400    Order Status: Completed Specimen: Incision site from Arm, Left Updated: 07/24/23 0710     Aerobic Bacterial Culture GRAM NEGATIVE GARRET,  NON-LACTOSE   Many  Identification and susceptibility pending        STAPHYLOCOCCUS EPIDERMIDIS  Many      Narrative:      exposed left arm AV graft    Aerobic culture [[id number]] Collected: 07/21/23 1300    Order Status: Completed Specimen: Incision site from Arm, Left Updated: 07/24/23 0702     Aerobic Bacterial Culture No growth    Narrative:      left arm AV graft (venous end)    Aerobic culture [[id number]] Collected: 07/21/23 1300    Order Status: Completed Specimen: Incision site from Arm, Left Updated: 07/24/23 0701     Aerobic Bacterial Culture No growth    Narrative:      left arm AV graft (arterial end)    Aerobic culture [[id number]] Collected: 07/21/23 1300    Order Status: Completed Specimen: Incision site from Arm, Left Updated: 07/24/23 0700     Aerobic Bacterial Culture No growth    Narrative:      distal sapphire-graft fluid venous end  (x2 swabs @ same site)    Aerobic culture [[id number]] Collected: 07/21/23 1300    Order Status: Completed Specimen: Incision site from Arm, Left Updated: 07/24/23 0700     Aerobic Bacterial Culture No growth    Narrative:      proximal sapphire-graft fluid arterial end  (x2 swabs @ same site)    Blood culture x two cultures. Draw prior to antibiotics. [[id number]] Collected: 07/19/23 1602    Order Status: Completed Specimen: Blood from Peripheral, Forearm, Right Updated: 07/23/23 1703     Blood Culture, Routine No Growth after 4 days.    Narrative:      Aerobic and anaerobic    Blood culture x two cultures. Draw prior to antibiotics. [[id number]] Collected: 07/19/23 1547    Order Status: Completed Specimen: Blood from Peripheral, Forearm, Right Updated: 07/23/23 1703     Blood Culture, Routine No Growth after 4 days.    Narrative:      Aerobic and anaerobic    Culture, Anaerobe [[id number]] Collected: 07/21/23 1400    Order Status: Completed Specimen: Incision site from Arm, Left Updated: 07/23/23 0646     Anaerobic Culture Culture in progress    Narrative:      exposed  left arm AV graft    Culture, Anaerobe [[id number]] Collected: 07/21/23 1300    Order Status: Completed Specimen: Incision site from Arm, Left Updated: 07/23/23 0643     Anaerobic Culture Culture in progress    Narrative:      left arm AV graft (venous end)    Culture, Anaerobe [[id number]] Collected: 07/21/23 1300    Order Status: Completed Specimen: Incision site from Arm, Left Updated: 07/23/23 0641     Anaerobic Culture Culture in progress    Narrative:      left arm AV graft (arterial end)    Culture, Anaerobe [[id number]] Collected: 07/21/23 1300    Order Status: Completed Specimen: Incision site from Arm, Left Updated: 07/23/23 0641     Anaerobic Culture Culture in progress    Narrative:      distal sapphire-graft fluid venous end  (x2 swabs @ same site)    Culture, Anaerobe [[id number]] Collected: 07/21/23 1300    Order Status: Completed Specimen: Incision site from Arm, Left Updated: 07/23/23 0640     Anaerobic Culture Culture in progress    Narrative:      proximal sapphire-graft fluid arterial end  (x2 swabs @ same site)    Aerobic culture (Specify Source) **CANNOT BE ORDERED AS STAT** [[id number]]  (Abnormal)  (Susceptibility) Collected: 07/20/23 0528    Order Status: Completed Specimen: Wound from Toe, Left Foot Updated: 07/22/23 1301     Aerobic Bacterial Culture STAPHYLOCOCCUS EPIDERMIDIS  Many      Narrative:      Left middle toe    Gram stain [[id number]] Collected: 07/21/23 1400    Order Status: Completed Specimen: Incision site from Arm, Left Updated: 07/22/23 1117     Gram Stain Result Few WBC's      Moderate Gram positive cocci      Rare Gram negative rods    Narrative:      exposed left arm AV graft    Gram stain [[id number]] Collected: 07/21/23 1300    Order Status: Completed Specimen: Incision site from Arm, Left Updated: 07/22/23 1114     Gram Stain Result Few WBC's      No organisms seen    Narrative:      left arm AV graft (venous end)    Gram stain [[id number]] Collected: 07/21/23 1300    Order Status:  Completed Specimen: Incision site from Arm, Left Updated: 07/22/23 1112     Gram Stain Result Few WBC's      No organisms seen    Narrative:      left arm AV graft (arterial end)    Gram stain [[id number]] Collected: 07/21/23 1300    Order Status: Completed Specimen: Incision site from Arm, Left Updated: 07/22/23 1058     Gram Stain Result Rare WBC's      No organisms seen    Narrative:      distal sapphire-graft fluid venous end  (x2 swabs @ same site)    Gram stain [[id number]] Collected: 07/21/23 1300    Order Status: Completed Specimen: Incision site from Arm, Left Updated: 07/22/23 1057     Gram Stain Result Rare WBC's      No organisms seen    Narrative:      proximal sapphire-graft fluid arterial end  (x2 swabs @ same site)    Fungus culture [[id number]] Collected: 07/21/23 1300    Order Status: Sent Specimen: Incision site from Arm, Left Updated: 07/21/23 1748    Fungus culture [[id number]] Collected: 07/21/23 1300    Order Status: Sent Specimen: Incision site from Arm, Left Updated: 07/21/23 1722    Fungus culture [[id number]] Collected: 07/21/23 1400    Order Status: Sent Specimen: Incision site from Arm, Left Updated: 07/21/23 1717    Fungus culture [[id number]] Collected: 07/21/23 1300    Order Status: Sent Specimen: Incision site from Arm, Left Updated: 07/21/23 1712    Fungus culture [[id number]] Collected: 07/21/23 1300    Order Status: Sent Specimen: Incision site from Arm, Left Updated: 07/21/23 1706    Culture, Anaerobe [[id number]] Collected: 07/21/23 1300    Order Status: Canceled Specimen: Incision site from Arm, Left     Aerobic culture [[id number]] Collected: 07/21/23 1300    Order Status: Canceled Specimen: Incision site from Arm, Left     Fungus culture [[id number]] Collected: 07/21/23 1300    Order Status: Canceled Specimen: Incision site from Arm, Left     AFB Culture & Smear [[id number]] Collected: 07/21/23 1300    Order Status: Canceled Specimen: Incision site from Arm, Left     Gram stain [[id number]]  Collected: 07/21/23 1300    Order Status: Canceled Specimen: Incision site from Arm, Left

## 2023-07-25 NOTE — PLAN OF CARE
Patient AAOx4, no acute distress noted, patient free from falls or injury this shift. Bed in low position, wheels locked, call light in reach for assistance, plan of care continue.       Problem: Skin Injury Risk Increased  Goal: Skin Health and Integrity  Outcome: Ongoing, Progressing  Intervention: Optimize Skin Protection  Flowsheets (Taken 7/25/2023 0438)  Skin Protection: incontinence pads utilized  Head of Bed (HOB) Positioning: HOB elevated     Problem: Impaired Wound Healing  Goal: Optimal Wound Healing  Outcome: Ongoing, Progressing  Intervention: Promote Wound Healing  Flowsheets (Taken 7/25/2023 0438)  Oral Nutrition Promotion: rest periods promoted  Sleep/Rest Enhancement: awakenings minimized  Pain Management Interventions: quiet environment facilitated

## 2023-07-25 NOTE — PROGRESS NOTES
Pharmacist Renal Dose Adjustment Note    Eva Bloom is a 57 y.o. female being treated with the medication Apixaban 5 mg BID     Patient Data:    Vital Signs (Most Recent):  Temp: 97.8 °F (36.6 °C) (07/25/23 1400)  Pulse: 86 (07/25/23 1600)  Resp: 18 (07/25/23 1600)  BP: (!) 194/102 (07/25/23 1600)  SpO2: 100 % (07/25/23 1600) Vital Signs (72h Range):  Temp:  [97.2 °F (36.2 °C)-98.7 °F (37.1 °C)]   Pulse:  []   Resp:  [11-20]   BP: (101-194)/()   SpO2:  [90 %-100 %]      Recent Labs   Lab 07/21/23  1532 07/22/23  0540 07/24/23  0730   CREATININE 4.4* 5.1* 4.8*     Serum creatinine: 4.8 mg/dL (H) 07/24/23 0730  Estimated creatinine clearance: 10.6 mL/min (A)    Medication: Apixaban 5 mg BID will be changed to Apixaban 2.5 mg BID        Pharmacist's Name: Sonya Nuñez  Pharmacist's Extension: [phone]

## 2023-07-25 NOTE — PROGRESS NOTES
[facility] Medicine  Progress Note    Patient Name: Eva Bloom  MRN: [id number]  Patient Class: IP- Inpatient   Admission Date: 7/19/2023  Length of Stay: 5 days  Attending Physician: Leonardo Robledo MD  Primary Care Provider: Sowmya Mccain MD        Subjective:     Principal Problem:Toe ulcer        HPI:  Eva Bloom 57 y.o. female with afib on eliquis, HTN, CHF, ESRD on HD T/TH/S, RCC presents to the hospital chief complaint of toe wound.  She reports 1 month an ulcer to left toe that occurred after a chair rolled over foot.  He is had increasing pain to the toe described as a throbbing.  She attempted treatment at home with ice without improvement.  She is dialyzed Tuesday Thursday Saturday at [facility] in [place].  She no longer makes urine.  She denies fever chest pain shortness breast nausea vomiting abdominal pain melena hematuria hematemesis dizziness syncope.    In the ED, afebrile without leukocytosis x-ray foot without fracture or dislocation potassium 4.3 lactic acid negative heme-positive stool.      Overview/Hospital Course:  Eva Bloom 57 y.o. female with afib on eliquis, HTN, CHF, ESRD on HD T/TH/S, RCC presents to the hospital chief complaint of toe wound.  She reports 1 month an ulcer to left toe that occurred after a chair rolled over foot.  He is had increasing pain to the toe described as a throbbing.  She attempted treatment at home with ice without improvement.  She is dialyzed Tuesday Thursday Saturday at [facility].  She no longer makes urine.  She denies fever chest pain shortness breast nausea vomiting abdominal pain melena hematuria hematemesis dizziness syncope.    In the ED, afebrile without leukocytosis x-ray foot without fracture or dislocation potassium 4.3 lactic acid negative heme-positive stool.podiatry is consulted  and no plan for intervention until PAD is addressed by vascular surgery,US arterial show bilateral 50% stenosis,  S/P  S/p L AVG excision and ligation.  TDC placed by IR ( following Vascular surgery) for new HD access  Per GI patient has hemorrhoid,started on hydrocortisone,eliquis on hold at this time.HH is improved with pack of RBC.  Possible leg angiography in AM,CT abdomen,pelvic is pending.      Interval History: had angiogram this morning of left lower extremity, seen in HD this afternoon. Having increasing pain to left foot. Groin with no swelling or hematoma. Neurovascularly intact. Pain medication given and improved    Review of Systems   Constitutional:  Negative for activity change.   HENT:  Negative for congestion.    Respiratory:  Negative for chest tightness.    Cardiovascular:  Negative for chest pain.   Genitourinary:  Negative for difficulty urinating.   Objective:     Vital Signs (Most Recent):  Temp: 97.8 °F (36.6 °C) (07/25/23 1400)  Pulse: 86 (07/25/23 1600)  Resp: 18 (07/25/23 1600)  BP: (!) 194/102 (07/25/23 1600)  SpO2: 100 % (07/25/23 1600) Vital Signs (24h Range):  Temp:  [97.2 °F (36.2 °C)-97.8 °F (36.6 °C)] 97.8 °F (36.6 °C)  Pulse:  [62-86] 86  Resp:  [11-20] 18  SpO2:  [90 %-100 %] 100 %  BP: (119-194)/() 194/102     Weight: 52.5 kg (115 lb 11.9 oz)  Body mass index is 19.87 kg/m².    Intake/Output Summary (Last 24 hours) at 7/25/2023 1628  Last data filed at 7/25/2023 0000  Gross per 24 hour   Intake 334.68 ml   Output 0 ml   Net 334.68 ml           Physical Exam  Constitutional:       Appearance: Normal appearance. She is not ill-appearing.   HENT:      Head: Atraumatic.   Cardiovascular:      Rate and Rhythm: Normal rate.   Pulmonary:      Effort: Pulmonary effort is normal. No respiratory distress.   Musculoskeletal:      Comments: Left foot with palpable dp pulses, neurovascularly intact. Right groin access dressing clean, dry and no palpable hematoma or bruising.   Neurological:      Mental Status: She is alert.           Significant Labs: All pertinent labs within the past 24 hours have  been reviewed.  CBC:   Recent Labs   Lab 07/25/23  0505   WBC 6.22   HGB 8.9*   HCT 27.9*          CMP:   Recent Labs   Lab 07/24/23  0730      K 4.0      CO2 20*   GLU 83   BUN 43*   CREATININE 4.8*   CALCIUM 9.7   ANIONGAP 15         Significant Imaging:       Assessment/Plan:      * Toe ulcer  Presents with ulcer to left toe after mechanical injury 1 month ago. Afebrile without leukocytosis. X-ray without fracture or dislocation  Podiatry consulted  Continue rocephin/vanc  NUVIA noted and Vascular consulted - underwent successful revascularization on 7/25.  Blood cultures pending neg to date.  Toe wound growing staph epidermidis        Hemorrhoid  Patient has a him high started on hydrocortisone with Eliquis on hold.    Monitor      Critical limb ischemia of left lower extremity with gangrene  Noted on arterial ultrasound. Vascular surgery consulted  - underwent angiogram on 7/25 - underwent successful revascularizatoin  - will discuss with Podiatry about wound to toe  - resume anticoagulation tomorrow (7/26) per Vascular      Hypothyroidism  Lab Results   Component Value Date    TSH 15.273 (H) 07/10/2023     Resume home synthroid has not been taking outpatient    Aortic atherosclerosis  On medical therapy.      Arteriovenous graft infection  Underwent excision/ligation of left AVG on 7/21  - cultures growing Achromobacter yxlosoxidans, staph epidermidis and yeast  - Change to meropenem and vancomycin  - ID consulted for recommendations  - treat yeast? No ID yet      PAF (paroxysmal atrial fibrillation)  Patient with Paroxysmal (<7 days) atrial fibrillation which is controlled currently with Beta Blocker.  Anticoagulation not indicated due to home eliquis held given anemia and Heme-positive stool in ED.  HH is stable, back on eliquis.    ICD (implantable cardioverter-defibrillator) in place - SubQ  Stable denies discharge    Anemia in ESRD (end-stage renal disease)  Presents with hemoglobin of  6.2. previously 8 to 9 2 weeks ago. Denies active bleeding. Heme-positive in ED.   Type and screen and transfuse 1 unit  Will hold home eliquis as heme positive   GI consulted  Repeat CBC  HH is improved.    Chronic combined systolic and diastolic congestive heart failure  Patient is identified as having Combined Systolic and Diastolic heart failure that is Chronic. CHF is currently controlled. Latest ECHO performed and demonstrates- Results for orders placed during the hospital encounter of 05/08/23    Echo Saline Bubble? No    Interpretation Summary  · The left ventricle is normal in size with mild eccentric hypertrophy and severely decreased systolic function.  · The estimated ejection fraction is 23%.  · There is severe left ventricular global hypokinesis.  · Grade II left ventricular diastolic dysfunction.  · Moderate left atrial enlargement.  · Normal right ventricular size with normal right ventricular systolic function.  · Moderate right atrial enlargement.  · There is mild aortic valve stenosis.  · Aortic valve area is 1.58 cm2; peak velocity is 2.01 m/s; mean gradient is 8 mmHg.  · Moderate mitral regurgitation.  · Moderate to severe tricuspid regurgitation.  · Mild pulmonic regurgitation.  · Elevated central venous pressure (15 mmHg).  · The estimated PA systolic pressure is 47 mmHg.  · There is mild-moderate pulmonary hypertension.  · Moderate posterolateral pericardial effusion. Trivial under the RA  . Continue Beta Blocker and ARNI and monitor clinical status closely. Monitor on telemetry. Patient is off CHF pathway.  Monitor strict Is&Os and daily weights.  Place on fluid restriction of 1 L. Cardiology has not been any consulted. Continue to stress to patient importance of self efficacy and  on diet for CHF. Last BNP reviewed- and noted below No results for input(s): BNP, BNPTRIAGEBLO in the last 168 hours..    ESRD (end stage renal disease) on dialysis  Nephrology consulted for HD,  AVG  malfunction,    S/P S/p L AVG excision and ligation.  TDC placed by IR ( following Vascular surgery) for new HD access      Pulmonary hypertension  Fluid removal with HD.      History of kidney cancer  Followed by oncology outpatient. Home cabometyx held for last week due to diarrhea. C diff negative 7/2023 though previously positive. Continue outpatient oncology follow up    Essential hypertension  Hypotensive on arrival. Home hydralazine, held. Continue home metoprolol with hold parameters for HR and BP.       VTE Risk Mitigation (From admission, onward)         Ordered     apixaban tablet 2.5 mg  2 times daily         07/25/23 1640     IP VTE HIGH RISK PATIENT  Once         07/19/23 1747     Place sequential compression device  Until discontinued         07/19/23 1747     Place PING hose  Until discontinued         07/19/23 1747                Discharge Planning   MAVIS: 7/28/2023     Code Status: Full Code   Is the patient medically ready for discharge?:     Reason for patient still in hospital (select all that apply): Treatment  Discharge Plan A: Home with family   Discharge Delays: None known at this time              Leonardo Robledo MD  Department of Hospital Medicine   [facility]

## 2023-07-25 NOTE — BRIEF OP NOTE
Date: 07/25/2023    Surgeon: Obi Werner MD PhD    Assistant: none    Pre-op Diagnosis: PAD, Chronic limb threatening ischemia,    Post-op Diagnosis: Same    Procedures:   1. Conscious sedation  2. Ultrasound-guided access to the left common femoral artery, in antegrade direction.   3. Percutaneous closure of the above access site using 6 Yemeni Mynx closure device   4. Left lower extremity diagnostic angiogram    5. Percutaneous transluminal angioplasty of the anterior tibial artery ostia with a 3 by 40 mm chocolate balloon  6. Percutaneous transluminal angioplasty of the entire length of the left posterior tibial artery with a 2 mm coyote balloon, and the distal posterior tibial artery with a 1.5 mm Ultraverse  7. Intravascular lithotripsy (shockwave) of the proximal posterior tibial artery with a 2.5 mm balloon.      EBL: Minimal    Anesthesia: Local, RN IV sedation    Findings:   Successful revascularization / resolution of stenosis; significant slow flow globally consistent with patients heart failure    Complications:  None; patient tolerated the procedure well.    Disposition: Recoery- hemodynamically stable in good condition    Plan:  Pt transport directly to dialysis unit for HD  Pt should remain flat til 5pm  Knee immobilizer to keep leg straight til 5 pm  Q1 groin checks  Plan to restart DOAC tmrw

## 2023-07-25 NOTE — ANESTHESIA PROCEDURE NOTES
Peripheral Block    Patient location during procedure: holding area    Reason for block: primary anesthetic    Diagnosis: ESRD   Start time: 7/21/2023 10:50 AM  Timeout: 7/21/2023 10:50 AM   End time: 7/21/2023 10:50 AM    Staffing  Authorizing Provider: Sangita Villarreal MD  Performing Provider: Sangita Villarreal MD    Preanesthetic Checklist  Completed: patient identified, IV checked, site marked, risks and benefits discussed, surgical consent, monitors and equipment checked, pre-op evaluation and timeout performed  Peripheral Block  Patient position: supine  Prep: ChloraPrep  Patient monitoring: heart rate, cardiac monitor, continuous pulse ox, continuous capnometry and frequent blood pressure checks  Block type: supraclavicular  Laterality: right  Injection technique: single shot  Needle  Needle type: Stimuplex   Needle gauge: 22 G  Needle length: 2 in  Needle localization: anatomical landmarks and ultrasound guidance   -ultrasound image captured on disc.  Assessment  Injection assessment: negative aspiration, negative parasthesia and local visualized surrounding nerve  Paresthesia pain: none  Heart rate change: no  Slow fractionated injection: yes  Pain Tolerance: comfortable throughout block  Medications:    Medications: bupivacaine (pf) (MARCAINE) injection 0.5% - Perineural   15 mL - 7/21/2023 10:50:00 AM    Additional Notes  Intercostal brachial field block performed with lidocaine 2% 5ml  + 5 ml of 0.5 bupivacaine for additional coverage.    VSS.  See anesthesia record.  Patient tolerated procedure well.

## 2023-07-25 NOTE — NURSING
[facility] [facility] [place] 4 [place]      7/25/2023       Skin assessed on: Q Shift      [x] No Pressure Injuries Present    [x]Prevention Measures Documented    [] Yes LDA  for Pressure Injury Previously documented     [] Yes New Pressure Injury Discovered   [] LDA for New Pressure Injury Added      Attending RN:  Marcelo Clay LPN     Second RN:  Diana Pompa LPN

## 2023-07-25 NOTE — PLAN OF CARE
Problem: Adult Inpatient Plan of Care  Goal: Plan of Care Review  7/25/2023 1857 by Marcelo Clay LPN  Outcome: Ongoing, Progressing  7/25/2023 1856 by Marcelo Clay LPN  Outcome: Ongoing, Progressing  Goal: Patient-Specific Goal (Individualized)  7/25/2023 1857 by Marcelo Clay LPN  Outcome: Ongoing, Progressing  7/25/2023 1856 by Marcelo Clay LPN  Outcome: Ongoing, Progressing  Goal: Absence of Hospital-Acquired Illness or Injury  7/25/2023 1857 by Marcelo Clay LPN  Outcome: Ongoing, Progressing  7/25/2023 1856 by Marcelo Clay LPN  Outcome: Ongoing, Progressing  Goal: Optimal Comfort and Wellbeing  7/25/2023 1857 by Marcelo Clay LPN  Outcome: Ongoing, Progressing  7/25/2023 1856 by Marcelo Clay LPN  Outcome: Ongoing, Progressing  Goal: Readiness for Transition of Care  7/25/2023 1857 by Marcelo Clay LPN  Outcome: Ongoing, Progressing  7/25/2023 1856 by Marcelo Clay LPN  Outcome: Ongoing, Progressing     Problem: Skin Injury Risk Increased  Goal: Skin Health and Integrity  7/25/2023 1857 by Marcelo Clay LPN  Outcome: Ongoing, Progressing  7/25/2023 1856 by Marcelo Clay LPN  Outcome: Ongoing, Progressing     Problem: Fall Injury Risk  Goal: Absence of Fall and Fall-Related Injury  7/25/2023 1857 by Marcelo Clay LPN  Outcome: Ongoing, Progressing  7/25/2023 1856 by Marcelo Clay LPN  Outcome: Ongoing, Progressing     Problem: Device-Related Complication Risk (Hemodialysis)  Goal: Safe, Effective Therapy Delivery  7/25/2023 1857 by Mareclo Clay LPN  Outcome: Ongoing, Progressing  7/25/2023 1856 by Marcelo Clay LPN  Outcome: Ongoing, Progressing     Problem: Hemodynamic Instability (Hemodialysis)  Goal: Effective Tissue Perfusion  7/25/2023 1857 by Marcelo Clay LPN  Outcome: Ongoing, Progressing  7/25/2023 1856 by  Marcelo Clay LPN  Outcome: Ongoing, Progressing     Problem: Infection (Hemodialysis)  Goal: Absence of Infection Signs and Symptoms  7/25/2023 1857 by Marcelo Clay LPN  Outcome: Ongoing, Progressing  7/25/2023 1856 by Marcelo Clay LPN  Outcome: Ongoing, Progressing     Problem: Infection  Goal: Absence of Infection Signs and Symptoms  7/25/2023 1857 by Marcelo Clay LPN  Outcome: Ongoing, Progressing  7/25/2023 1856 by Marcelo Clay LPN  Outcome: Ongoing, Progressing     Problem: Anemia  Goal: Anemia Symptom Improvement  7/25/2023 1857 by Marcelo Clay LPN  Outcome: Ongoing, Progressing  7/25/2023 1856 by Marcelo Clay LPN  Outcome: Ongoing, Progressing     Problem: Pain Acute  Goal: Acceptable Pain Control and Functional Ability  7/25/2023 1857 by Marcelo Clay LPN  Outcome: Ongoing, Progressing  7/25/2023 1856 by Marcelo Clay LPN  Outcome: Ongoing, Progressing     Problem: Oral Intake Inadequate  Goal: Improved Oral Intake  7/25/2023 1857 by Marcelo Clay LPN  Outcome: Ongoing, Progressing  7/25/2023 1856 by Marcelo Clay LPN  Outcome: Ongoing, Progressing     Problem: Impaired Wound Healing  Goal: Optimal Wound Healing  7/25/2023 1857 by Marcelo Clay LPN  Outcome: Ongoing, Progressing  7/25/2023 1856 by Marcelo Clay LPN  Outcome: Ongoing, Progressing     Problem: Fatigue  Goal: Improved Activity Tolerance  7/25/2023 1857 by Marcelo Clay LPN  Outcome: Ongoing, Progressing  7/25/2023 1856 by Marcelo Clay LPN  Outcome: Ongoing, Progressing

## 2023-07-25 NOTE — ASSESSMENT & PLAN NOTE
Patient with Paroxysmal (<7 days) atrial fibrillation which is controlled currently with Beta Blocker.  Anticoagulation not indicated due to home eliquis held given anemia and Heme-positive stool in ED.  HH is stable, back on eliquis.

## 2023-07-25 NOTE — ASSESSMENT & PLAN NOTE
Noted on arterial ultrasound. Vascular surgery consulted  - underwent angiogram on 7/25 - underwent successful revascularizatoin  - will discuss with Podiatry about wound to toe  - resume anticoagulation tomorrow (7/26) per Vascular

## 2023-07-25 NOTE — PROGRESS NOTES
Renal Progress Note    Date of Admission:  7/19/2023  3:06 PM    Length of Stay: 5  Days    Subjective: n/a    Objective:    Current Facility-Administered Medications   Medication    0.9%  NaCl infusion (for blood administration)    0.9%  NaCl infusion    acetaminophen tablet 650 mg    amLODIPine tablet 10 mg    cabozantinib Tab 60 mg    ceFEPIme (MAXIPIME) 1 g in dextrose 5 % in water (D5W) 5 % 100 mL IVPB (MB+)    cloNIDine 0.3 mg/24 hr td ptwk 1 patch    dextrose 50% injection 12.5 g    dextrose 50% injection 25 g    epoetin david-epbx injection 6,000 Units    fentaNYL 50 mcg/mL injection    glucagon (human recombinant) injection 1 mg    glucose chewable tablet 16 g    glucose chewable tablet 24 g    hydrALAZINE tablet 100 mg    HYDROcodone-acetaminophen 5-325 mg per tablet 1 tablet    hydrocortisone 2.5 % rectal cream    levothyroxine tablet 75 mcg    loperamide capsule 2 mg    magnesium oxide tablet 800 mg    magnesium oxide tablet 800 mg    melatonin tablet 6 mg    metoprolol succinate (TOPROL-XL) 24 hr tablet 50 mg    midazolam (VERSED) 1 mg/mL injection    multivitamin tablet    naloxone 0.4 mg/mL injection 0.02 mg    ondansetron injection 8 mg    sacubitriL-valsartan 24-26 mg per tablet 1 tablet    senna-docusate 8.6-50 mg per tablet 1 tablet    sodium chloride 0.9% bolus 250 mL 250 mL    sodium chloride 0.9% flush 10 mL    vancomycin - pharmacy to dose     Facility-Administered Medications Ordered in Other Encounters   Medication    0.9%  NaCl infusion       Vitals:    07/25/23 0851 07/25/23 0856 07/25/23 0900 07/25/23 0905   BP: (!) 152/81 (!) 140/74 132/74 (!) 140/82   BP Location: Right leg Right leg Left arm Right leg   Patient Position:       Pulse: 70 68 69 68   Resp: 18 12 17 15   Temp:       TempSrc:       SpO2: 95% 99% 100% 98%   Weight:       Height:           I/O last 3 completed shifts:  In: 814.7 [P.O.:720; IV Piggyback:94.7]  Out: 0   No intake/output data  recorded.      Physical Exam:     General: NAD seen during Dialysis  Neck: supple  Heart: RRR  Lungs: unlabored breathing  Abdomen: n/a  Limbs: n/a  Neurologic: Drowsy (post angiogram)      Laboratories:    Recent Labs   Lab 07/25/23  0505   WBC 6.22   RBC 2.77*   HGB 8.9*   HCT 27.9*      *   MCH 32.1*   MCHC 31.9*       No results for input(s): GLUCOSE, CALCIUM, PROT, NA, K, CO2, CL, BUN, CREATININE, ALKPHOS, ALT, AST, BILITOT in the last 24 hours.    Invalid input(s):  ALBUMIN      No results for input(s): COLORU, CLARITYU, SPECGRAV, PHUR, PROTEINUA, GLUCOSEU, BLOODU, WBCU, RBCU, BACTERIA, MUCUS in the last 24 hours.    Invalid input(s):  BILIRUBINCON    Microbiology Results (last 7 days)       Procedure Component Value Units Date/Time    Aerobic culture [[id number]]  (Abnormal)  (Susceptibility) Collected: 07/21/23 1400    Order Status: Completed Specimen: Incision site from Arm, Left Updated: 07/25/23 0804     Aerobic Bacterial Culture ACHROMOBACTER XYLOSOXIDANS SUBSP. DENTRIFICANS  Many        STAPHYLOCOCCUS EPIDERMIDIS  Many      Narrative:      exposed left arm AV graft    Aerobic culture [[id number]] Collected: 07/21/23 1300    Order Status: Completed Specimen: Incision site from Arm, Left Updated: 07/25/23 0802     Aerobic Bacterial Culture No growth    Narrative:      left arm AV graft (venous end)    Aerobic culture [[id number]] Collected: 07/21/23 1300    Order Status: Completed Specimen: Incision site from Arm, Left Updated: 07/25/23 0759     Aerobic Bacterial Culture No growth    Narrative:      left arm AV graft (arterial end)    Aerobic culture [[id number]] Collected: 07/21/23 1300    Order Status: Completed Specimen: Incision site from Arm, Left Updated: 07/25/23 0758     Aerobic Bacterial Culture No growth    Narrative:      distal sapphire-graft fluid venous end  (x2 swabs @ same site)    Aerobic culture [[id number]] Collected: 07/21/23 1300    Order Status: Completed Specimen: Incision site  from Arm, Left Updated: 07/25/23 0758     Aerobic Bacterial Culture No growth    Narrative:      proximal sapphire-graft fluid arterial end  (x2 swabs @ same site)    AFB Culture & Smear [[id number]] Collected: 07/21/23 1400    Order Status: Completed Specimen: Incision site from Arm, Left Updated: 07/24/23 1703     AFB Culture & Smear Culture in progress     AFB CULTURE STAIN No acid fast bacilli seen.    Narrative:      exposed left arm AV graft    AFB Culture & Smear [[id number]] Collected: 07/21/23 1300    Order Status: Completed Specimen: Incision site from Arm, Left Updated: 07/24/23 1703     AFB Culture & Smear Culture in progress     AFB CULTURE STAIN No acid fast bacilli seen.    Narrative:      proximal sapphire-graft fluid arterial end  (x2 swabs @ same site)    AFB Culture & Smear [[id number]] Collected: 07/21/23 1300    Order Status: Completed Specimen: Incision site from Arm, Left Updated: 07/24/23 1703     AFB Culture & Smear Culture in progress     AFB CULTURE STAIN No acid fast bacilli seen.    Narrative:      left arm AV graft (venous end)    AFB Culture & Smear [[id number]] Collected: 07/21/23 1300    Order Status: Completed Specimen: Incision site from Arm, Left Updated: 07/24/23 1703     AFB Culture & Smear Culture in progress     AFB CULTURE STAIN No acid fast bacilli seen.    Narrative:      distal sapphire-graft fluid venous end  (x2 swabs @ same site)    AFB Culture & Smear [[id number]] Collected: 07/21/23 1300    Order Status: Completed Specimen: Incision site from Arm, Left Updated: 07/24/23 1703     AFB Culture & Smear Culture in progress     AFB CULTURE STAIN No acid fast bacilli seen.    Narrative:      left arm AV graft (arterial end)    Blood culture x two cultures. Draw prior to antibiotics. [[id number]] Collected: 07/19/23 1602    Order Status: Completed Specimen: Blood from Peripheral, Forearm, Right Updated: 07/23/23 1703     Blood Culture, Routine No Growth after 4 days.    Narrative:      Aerobic  and anaerobic    Blood culture x two cultures. Draw prior to antibiotics. [[id number]] Collected: 07/19/23 1547    Order Status: Completed Specimen: Blood from Peripheral, Forearm, Right Updated: 07/23/23 1703     Blood Culture, Routine No Growth after 4 days.    Narrative:      Aerobic and anaerobic    Culture, Anaerobe [[id number]] Collected: 07/21/23 1400    Order Status: Completed Specimen: Incision site from Arm, Left Updated: 07/23/23 0646     Anaerobic Culture Culture in progress    Narrative:      exposed left arm AV graft    Culture, Anaerobe [[id number]] Collected: 07/21/23 1300    Order Status: Completed Specimen: Incision site from Arm, Left Updated: 07/23/23 0643     Anaerobic Culture Culture in progress    Narrative:      left arm AV graft (venous end)    Culture, Anaerobe [[id number]] Collected: 07/21/23 1300    Order Status: Completed Specimen: Incision site from Arm, Left Updated: 07/23/23 0641     Anaerobic Culture Culture in progress    Narrative:      left arm AV graft (arterial end)    Culture, Anaerobe [[id number]] Collected: 07/21/23 1300    Order Status: Completed Specimen: Incision site from Arm, Left Updated: 07/23/23 0641     Anaerobic Culture Culture in progress    Narrative:      distal sapphire-graft fluid venous end  (x2 swabs @ same site)    Culture, Anaerobe [[id number]] Collected: 07/21/23 1300    Order Status: Completed Specimen: Incision site from Arm, Left Updated: 07/23/23 0640     Anaerobic Culture Culture in progress    Narrative:      proximal sapphire-graft fluid arterial end  (x2 swabs @ same site)    Aerobic culture (Specify Source) **CANNOT BE ORDERED AS STAT** [[id number]]  (Abnormal)  (Susceptibility) Collected: 07/20/23 0528    Order Status: Completed Specimen: Wound from Toe, Left Foot Updated: 07/22/23 1301     Aerobic Bacterial Culture STAPHYLOCOCCUS EPIDERMIDIS  Many      Narrative:      Left middle toe    Gram stain [[id number]] Collected: 07/21/23 1400    Order Status: Completed  Specimen: Incision site from Arm, Left Updated: 07/22/23 1117     Gram Stain Result Few WBC's      Moderate Gram positive cocci      Rare Gram negative rods    Narrative:      exposed left arm AV graft    Gram stain [[id number]] Collected: 07/21/23 1300    Order Status: Completed Specimen: Incision site from Arm, Left Updated: 07/22/23 1114     Gram Stain Result Few WBC's      No organisms seen    Narrative:      left arm AV graft (venous end)    Gram stain [[id number]] Collected: 07/21/23 1300    Order Status: Completed Specimen: Incision site from Arm, Left Updated: 07/22/23 1112     Gram Stain Result Few WBC's      No organisms seen    Narrative:      left arm AV graft (arterial end)    Gram stain [[id number]] Collected: 07/21/23 1300    Order Status: Completed Specimen: Incision site from Arm, Left Updated: 07/22/23 1058     Gram Stain Result Rare WBC's      No organisms seen    Narrative:      distal sapphire-graft fluid venous end  (x2 swabs @ same site)    Gram stain [[id number]] Collected: 07/21/23 1300    Order Status: Completed Specimen: Incision site from Arm, Left Updated: 07/22/23 1057     Gram Stain Result Rare WBC's      No organisms seen    Narrative:      proximal sapphire-graft fluid arterial end  (x2 swabs @ same site)    Fungus culture [[id number]] Collected: 07/21/23 1300    Order Status: Sent Specimen: Incision site from Arm, Left Updated: 07/21/23 1748    Fungus culture [[id number]] Collected: 07/21/23 1300    Order Status: Sent Specimen: Incision site from Arm, Left Updated: 07/21/23 1722    Fungus culture [[id number]] Collected: 07/21/23 1400    Order Status: Sent Specimen: Incision site from Arm, Left Updated: 07/21/23 1717    Fungus culture [[id number]] Collected: 07/21/23 1300    Order Status: Sent Specimen: Incision site from Arm, Left Updated: 07/21/23 1712    Fungus culture [[id number]] Collected: 07/21/23 1300    Order Status: Sent Specimen: Incision site from Arm, Left Updated: 07/21/23 1706     Culture, Anaerobe [[id number]] Collected: 07/21/23 1300    Order Status: Canceled Specimen: Incision site from Arm, Left     Aerobic culture [[id number]] Collected: 07/21/23 1300    Order Status: Canceled Specimen: Incision site from Arm, Left     Fungus culture [[id number]] Collected: 07/21/23 1300    Order Status: Canceled Specimen: Incision site from Arm, Left     AFB Culture & Smear [[id number]] Collected: 07/21/23 1300    Order Status: Canceled Specimen: Incision site from Arm, Left     Gram stain [[id number]] Collected: 07/21/23 1300    Order Status: Canceled Specimen: Incision site from Arm, Left               Diagnostic Tests: n/a        Assessment:    58 y/o female with ESRD on HD admitted with:    - L-Toe ulcer  - PAD LLE with gangrene  - Chronic combined HF  - PAF  - Hx. AICD  - Hx. Pulmonary HTN  - Anemia of ckd  - HTN  - AV access malfunction s/p excision and THDC placement              Plan:    - s/p LE angio   - Antibiotics per admitting  - Dialysis Q TTS  - Epogen 3 x week  - Renal diet + protein supplements  - Wound care per Podiatry  - Other problems per admitting      Will follow on Dialysis days.

## 2023-07-25 NOTE — SUBJECTIVE & OBJECTIVE
Interval History: had angiogram this morning of left lower extremity, seen in HD this afternoon. Having increasing pain to left foot. Groin with no swelling or hematoma. Neurovascularly intact. Pain medication given and improved    Review of Systems   Constitutional:  Negative for activity change.   HENT:  Negative for congestion.    Respiratory:  Negative for chest tightness.    Cardiovascular:  Negative for chest pain.   Genitourinary:  Negative for difficulty urinating.   Objective:     Vital Signs (Most Recent):  Temp: 97.8 °F (36.6 °C) (07/25/23 1400)  Pulse: 86 (07/25/23 1600)  Resp: 18 (07/25/23 1600)  BP: (!) 194/102 (07/25/23 1600)  SpO2: 100 % (07/25/23 1600) Vital Signs (24h Range):  Temp:  [97.2 °F (36.2 °C)-97.8 °F (36.6 °C)] 97.8 °F (36.6 °C)  Pulse:  [62-86] 86  Resp:  [11-20] 18  SpO2:  [90 %-100 %] 100 %  BP: (119-194)/() 194/102     Weight: 52.5 kg (115 lb 11.9 oz)  Body mass index is 19.87 kg/m².    Intake/Output Summary (Last 24 hours) at 7/25/2023 1628  Last data filed at 7/25/2023 0000  Gross per 24 hour   Intake 334.68 ml   Output 0 ml   Net 334.68 ml           Physical Exam  Constitutional:       Appearance: Normal appearance. She is not ill-appearing.   HENT:      Head: Atraumatic.   Cardiovascular:      Rate and Rhythm: Normal rate.   Pulmonary:      Effort: Pulmonary effort is normal. No respiratory distress.   Musculoskeletal:      Comments: Left foot with palpable dp pulses, neurovascularly intact. Right groin access dressing clean, dry and no palpable hematoma or bruising.   Neurological:      Mental Status: She is alert.           Significant Labs: All pertinent labs within the past 24 hours have been reviewed.  CBC:   Recent Labs   Lab 07/25/23  0505   WBC 6.22   HGB 8.9*   HCT 27.9*          CMP:   Recent Labs   Lab 07/24/23  0730      K 4.0      CO2 20*   GLU 83   BUN 43*   CREATININE 4.8*   CALCIUM 9.7   ANIONGAP 15         Significant Imaging:

## 2023-07-25 NOTE — PROGRESS NOTES
[facility]  Vascular Surgery  Progress Note    Patient Name: Eva Bloom  MRN: [id number]  Admission Date: 7/19/2023  Primary Care Provider: Sowmya Mccain MD    Subjective:     Interval History:  L toe ulcer stable.  CTA shows patent inf low  heavy calcifications below knee     Post-Op Info:  Procedure(s) (LRB):  LEFT ARM ARTERIOVENOUS GRAFT EXCISION  AND LIGATION (Left)   4 Days Post-Op      Medications:  Continuous Infusions:  Scheduled Meds:   amLODIPine  10 mg Oral Daily    cabozantinib  60 mg Oral Daily    ceFEPime (MAXIPIME) IVPB  1 g Intravenous Q24H    cloNIDine 0.3 mg/24 hr td ptwk  1 patch Transdermal Q7 Days    epoetin david-epbx  6,000 Units Intravenous Every Tues, Thurs, Sat    hydrALAZINE  100 mg Oral Q12H    hydrocortisone   Rectal BID    levothyroxine  75 mcg Oral Daily    metoprolol succinate  50 mg Oral Daily    multivitamin  1 tablet Oral Daily    sacubitriL-valsartan  1 tablet Oral BID     PRN Meds:sodium chloride, sodium chloride 0.9%, acetaminophen, dextrose 50%, dextrose 50%, glucagon (human recombinant), glucose, glucose, HYDROcodone-acetaminophen, loperamide, magnesium oxide, magnesium oxide, melatonin, naloxone, ondansetron, senna-docusate 8.6-50 mg, sodium chloride 0.9%, sodium chloride 0.9%, Pharmacy to dose Vancomycin consult **AND** vancomycin - pharmacy to dose     Objective:     Vital Signs (Most Recent):  Temp: 97.2 °F (36.2 °C) (07/25/23 0736)  Pulse: 71 (07/25/23 0736)  Resp: 20 (07/25/23 0736)  BP: (!) 141/92 (07/25/23 0736)  SpO2: 98 % (07/25/23 0736) Vital Signs (24h Range):  Temp:  [97.2 °F (36.2 °C)-97.6 °F (36.4 °C)] 97.2 °F (36.2 °C)  Pulse:  [58-71] 71  Resp:  [14-20] 20  SpO2:  [90 %-98 %] 98 %  BP: (101-148)/(52-92) 141/92           Physical Exam  Palpable fem pulses     Significant Labs:  CBC:   Recent Labs   Lab 07/25/23  0505   WBC 6.22   RBC 2.77*   HGB 8.9*   HCT 27.9*      *   MCH 32.1*   MCHC 31.9*     CMP:   Recent Labs   Lab  07/24/23  0730   GLU 83   CALCIUM 9.7      K 4.0   CO2 20*      BUN 43*   CREATININE 4.8*       Coagulation: No results for input(s): LABPROT, INR, APTT in the last 48 hours.  eGFR: No results for input(s): EGFRIFAFRICA, EGFRCYSTC, LABGLOM in the last 48 hours.    Invalid input(s): EGFRNON-AA  Hemoglobin: No results for input(s): HGBA1C in the last 48 hours.    Significant Diagnostics:  I reviewed LE duplex and past CT scans of abdomen from earlier this month      CTA from 7/24 shows patent inf low  heavy calcifications below knee     Assessment/Plan:     Active Diagnoses:    Diagnosis Date Noted POA    PRINCIPAL PROBLEM:  Toe ulcer [L97.509] 07/19/2023 Yes    Hemorrhoid [K64.9] 07/24/2023 Yes    Critical limb ischemia of left lower extremity with gangrene [I70.262] 07/20/2023 Yes    Hypothyroidism [E03.9] 07/19/2023 Yes    Aortic atherosclerosis [I70.0] 07/06/2023 Yes    PAF (paroxysmal atrial fibrillation) [I48.0] 07/25/2022 Yes    ICD (implantable cardioverter-defibrillator) in place - SubQ [Z95.810] 07/15/2021 Yes    Anemia in ESRD (end-stage renal disease) [N18.6, D63.1]  Yes    Chronic combined systolic and diastolic congestive heart failure [I50.42]  Yes    ESRD (end stage renal disease) on dialysis [N18.6, Z99.2]  Not Applicable    Pulmonary hypertension [I27.20] 07/21/2017 Yes    History of kidney cancer [Z85.528] 09/02/2016 Not Applicable    Essential hypertension [I10] 09/23/2015 Yes      Problems Resolved During this Admission:     57 M hx of ESRD presenting with chronic  L foot toe ulcer and found to have infected L AVG s/p resection and ligation.  Arm is healing well cultures indicating no infection of remaining AVG stumps.  Will defer to ID for further abx course based on exposed graft cx.      Plan for angio today for L toe ulcer  hold eliquis  Malampati 3  ASA 3      Obi Werner MD  Vascular Surgery  [facility]

## 2023-07-25 NOTE — ASSESSMENT & PLAN NOTE
Presents with ulcer to left toe after mechanical injury 1 month ago. Afebrile without leukocytosis. X-ray without fracture or dislocation  Podiatry consulted  Continue rocephin/vanc  NUVIA noted and Vascular consulted - underwent successful revascularization on 7/25.  Blood cultures pending neg to date.  Toe wound growing staph epidermidis

## 2023-07-25 NOTE — NURSING
[facility] [place]  Nurses Note -- 4 Eyes      7/24/2023       Skin assessed on: Q Shift      [x] No Pressure Injuries Present    []Prevention Measures Documented    [] Yes LDA  for Pressure Injury Previously documented     [] Yes New Pressure Injury Discovered   [] LDA for New Pressure Injury Added      Attending RN:  Carrie Vickers RN     Second RN:  Kelly Manning RN

## 2023-07-25 NOTE — PLAN OF CARE
Problem: Adult Inpatient Plan of Care  Goal: Plan of Care Review  Outcome: Ongoing, Progressing  Goal: Patient-Specific Goal (Individualized)  Outcome: Ongoing, Progressing  Goal: Absence of Hospital-Acquired Illness or Injury  Outcome: Ongoing, Progressing  Goal: Optimal Comfort and Wellbeing  Outcome: Ongoing, Progressing  Goal: Readiness for Transition of Care  Outcome: Ongoing, Progressing     Problem: Skin Injury Risk Increased  Goal: Skin Health and Integrity  Outcome: Ongoing, Progressing     Problem: Fall Injury Risk  Goal: Absence of Fall and Fall-Related Injury  Outcome: Ongoing, Progressing     Problem: Device-Related Complication Risk (Hemodialysis)  Goal: Safe, Effective Therapy Delivery  Outcome: Ongoing, Progressing     Problem: Hemodynamic Instability (Hemodialysis)  Goal: Effective Tissue Perfusion  Outcome: Ongoing, Progressing     Problem: Infection (Hemodialysis)  Goal: Absence of Infection Signs and Symptoms  Outcome: Ongoing, Progressing     Problem: Infection  Goal: Absence of Infection Signs and Symptoms  Outcome: Ongoing, Progressing     Problem: Anemia  Goal: Anemia Symptom Improvement  Outcome: Ongoing, Progressing     Problem: Pain Acute  Goal: Acceptable Pain Control and Functional Ability  Outcome: Ongoing, Progressing     Problem: Oral Intake Inadequate  Goal: Improved Oral Intake  Outcome: Ongoing, Progressing     Problem: Impaired Wound Healing  Goal: Optimal Wound Healing  Outcome: Ongoing, Progressing     Problem: Fatigue  Goal: Improved Activity Tolerance  Outcome: Ongoing, Progressing

## 2023-07-25 NOTE — ANESTHESIA POSTPROCEDURE EVALUATION
Anesthesia Post Evaluation    Patient: Eva Bloom    Procedure(s) Performed: Procedure(s) (LRB):  LEFT ARM ARTERIOVENOUS GRAFT EXCISION  AND LIGATION (Left)    Final Anesthesia Type: regional      Patient location during evaluation: PACU  Patient participation: Yes- Able to Participate  Level of consciousness: awake and alert and oriented  Post-procedure vital signs: reviewed and stable  Pain management: adequate  Airway patency: patent    PONV status at discharge: No PONV  Anesthetic complications: no      Cardiovascular status: blood pressure returned to baseline, hemodynamically stable and stable  Respiratory status: unassisted, spontaneous ventilation and room air  Hydration status: euvolemic  Follow-up not needed.          Vitals Value Taken Time   /92 07/25/23 0736   Temp 36.2 °C (97.2 °F) 07/25/23 0736   Pulse 71 07/25/23 0736   Resp 20 07/25/23 0736   SpO2 98 % 07/25/23 0736         Event Time   Out of Recovery 07/21/2023 15:50:00         Pain/Mimi Score: Pain Rating Prior to Med Admin: 7 (7/24/2023 12:03 AM)  Mimi Score: 9 (7/25/2023  7:32 AM)

## 2023-07-26 LAB
BACTERIA SPEC AEROBE CULT: ABNORMAL
BACTERIA SPEC AEROBE CULT: ABNORMAL
BASOPHILS # BLD AUTO: 0.05 K/UL (ref 0–0.2)
BASOPHILS NFR BLD: 0.8 % (ref 0–1.9)
DIFFERENTIAL METHOD: ABNORMAL
EOSINOPHIL # BLD AUTO: 0.1 K/UL (ref 0–0.5)
EOSINOPHIL NFR BLD: 2.2 % (ref 0–8)
ERYTHROCYTE [DISTWIDTH] IN BLOOD BY AUTOMATED COUNT: 20 % (ref 11.5–14.5)
HCT VFR BLD AUTO: 28.6 % (ref 37–48.5)
HGB BLD-MCNC: 9.2 G/DL (ref 12–16)
IMM GRANULOCYTES # BLD AUTO: 0.05 K/UL (ref 0–0.04)
IMM GRANULOCYTES NFR BLD AUTO: 0.8 % (ref 0–0.5)
LYMPHOCYTES # BLD AUTO: 0.4 K/UL (ref 1–4.8)
LYMPHOCYTES NFR BLD: 7 % (ref 18–48)
MCH RBC QN AUTO: 32.7 PG (ref 27–31)
MCHC RBC AUTO-ENTMCNC: 32.2 G/DL (ref 32–36)
MCV RBC AUTO: 102 FL (ref 82–98)
MONOCYTES # BLD AUTO: 0.6 K/UL (ref 0.3–1)
MONOCYTES NFR BLD: 9.6 % (ref 4–15)
NEUTROPHILS # BLD AUTO: 5 K/UL (ref 1.8–7.7)
NEUTROPHILS NFR BLD: 79.6 % (ref 38–73)
NRBC BLD-RTO: 1 /100 WBC
PLATELET # BLD AUTO: 207 K/UL (ref 150–450)
PMV BLD AUTO: 9.7 FL (ref 9.2–12.9)
RBC # BLD AUTO: 2.81 M/UL (ref 4–5.4)
WBC # BLD AUTO: 6.27 K/UL (ref 3.9–12.7)

## 2023-07-26 PROCEDURE — 99223 1ST HOSP IP/OBS HIGH 75: CPT | Mod: ,,, | Performed by: INTERNAL MEDICINE

## 2023-07-26 PROCEDURE — 94761 N-INVAS EAR/PLS OXIMETRY MLT: CPT

## 2023-07-26 PROCEDURE — 63600175 PHARM REV CODE 636 W HCPCS: Performed by: INTERNAL MEDICINE

## 2023-07-26 PROCEDURE — 63600175 PHARM REV CODE 636 W HCPCS: Performed by: STUDENT IN AN ORGANIZED HEALTH CARE EDUCATION/TRAINING PROGRAM

## 2023-07-26 PROCEDURE — 36415 COLL VENOUS BLD VENIPUNCTURE: CPT | Performed by: STUDENT IN AN ORGANIZED HEALTH CARE EDUCATION/TRAINING PROGRAM

## 2023-07-26 PROCEDURE — 25000003 PHARM REV CODE 250: Performed by: INTERNAL MEDICINE

## 2023-07-26 PROCEDURE — 11000001 HC ACUTE MED/SURG PRIVATE ROOM

## 2023-07-26 PROCEDURE — 99232 PR SUBSEQUENT HOSPITAL CARE,LEVL II: ICD-10-PCS | Mod: ,,, | Performed by: PODIATRIST

## 2023-07-26 PROCEDURE — 99024 PR POST-OP FOLLOW-UP VISIT: ICD-10-PCS | Mod: ,,, | Performed by: SURGERY

## 2023-07-26 PROCEDURE — 99232 SBSQ HOSP IP/OBS MODERATE 35: CPT | Mod: ,,, | Performed by: PODIATRIST

## 2023-07-26 PROCEDURE — 25000003 PHARM REV CODE 250: Performed by: SURGERY

## 2023-07-26 PROCEDURE — 99024 POSTOP FOLLOW-UP VISIT: CPT | Mod: ,,, | Performed by: SURGERY

## 2023-07-26 PROCEDURE — 25000003 PHARM REV CODE 250: Performed by: STUDENT IN AN ORGANIZED HEALTH CARE EDUCATION/TRAINING PROGRAM

## 2023-07-26 PROCEDURE — 85025 COMPLETE CBC W/AUTO DIFF WBC: CPT | Performed by: STUDENT IN AN ORGANIZED HEALTH CARE EDUCATION/TRAINING PROGRAM

## 2023-07-26 PROCEDURE — 99223 PR INITIAL HOSPITAL CARE,LEVL III: ICD-10-PCS | Mod: ,,, | Performed by: INTERNAL MEDICINE

## 2023-07-26 PROCEDURE — 25000003 PHARM REV CODE 250: Performed by: PHYSICIAN ASSISTANT

## 2023-07-26 RX ORDER — MEROPENEM AND SODIUM CHLORIDE 500 MG/50ML
500 INJECTION, SOLUTION INTRAVENOUS
Status: DISCONTINUED | OUTPATIENT
Start: 2023-07-27 | End: 2023-07-29 | Stop reason: HOSPADM

## 2023-07-26 RX ORDER — NAPROXEN SODIUM 220 MG/1
81 TABLET, FILM COATED ORAL DAILY
Status: DISCONTINUED | OUTPATIENT
Start: 2023-07-26 | End: 2023-07-29 | Stop reason: HOSPADM

## 2023-07-26 RX ORDER — OXYCODONE AND ACETAMINOPHEN 7.5; 325 MG/1; MG/1
1 TABLET ORAL EVERY 4 HOURS PRN
Status: DISCONTINUED | OUTPATIENT
Start: 2023-07-26 | End: 2023-07-29 | Stop reason: HOSPADM

## 2023-07-26 RX ADMIN — APIXABAN 2.5 MG: 2.5 TABLET, FILM COATED ORAL at 09:07

## 2023-07-26 RX ADMIN — THERA TABS 1 TABLET: TAB at 09:07

## 2023-07-26 RX ADMIN — OXYCODONE AND ACETAMINOPHEN 1 TABLET: 7.5; 325 TABLET ORAL at 04:07

## 2023-07-26 RX ADMIN — APIXABAN 2.5 MG: 2.5 TABLET, FILM COATED ORAL at 08:07

## 2023-07-26 RX ADMIN — ACETAMINOPHEN 650 MG: 325 TABLET ORAL at 02:07

## 2023-07-26 RX ADMIN — HYDRALAZINE HYDROCHLORIDE 100 MG: 25 TABLET, FILM COATED ORAL at 09:07

## 2023-07-26 RX ADMIN — SACUBITRIL AND VALSARTAN 1 TABLET: 24; 26 TABLET, FILM COATED ORAL at 08:07

## 2023-07-26 RX ADMIN — MEROPENEM AND SODIUM CHLORIDE 1 G: 1 INJECTION, SOLUTION INTRAVENOUS at 02:07

## 2023-07-26 RX ADMIN — SACUBITRIL AND VALSARTAN 1 TABLET: 24; 26 TABLET, FILM COATED ORAL at 09:07

## 2023-07-26 RX ADMIN — OXYCODONE AND ACETAMINOPHEN 1 TABLET: 7.5; 325 TABLET ORAL at 10:07

## 2023-07-26 RX ADMIN — ASPIRIN 81 MG CHEWABLE TABLET 81 MG: 81 TABLET CHEWABLE at 09:07

## 2023-07-26 RX ADMIN — MEROPENEM AND SODIUM CHLORIDE 1 G: 1 INJECTION, SOLUTION INTRAVENOUS at 09:07

## 2023-07-26 RX ADMIN — MICAFUNGIN SODIUM 100 MG: 100 INJECTION, POWDER, LYOPHILIZED, FOR SOLUTION INTRAVENOUS at 11:07

## 2023-07-26 NOTE — ASSESSMENT & PLAN NOTE
Presents with hemoglobin of 6.2. previously 8-9 about 2 weeks ago. Denies active bleeding. Heme-positive in ED.   Type and screen and transfuse 1 unit  Will hold home eliquis as heme positive   GI consulted  Repeat CBC  HH is improved.  - ok to resume eliquis

## 2023-07-26 NOTE — PROGRESS NOTES
[facility] Medicine  Progress Note    Patient Name: Eva Bloom  MRN: [id number]  Patient Class: IP- Inpatient   Admission Date: 7/19/2023  Length of Stay: 6 days  Attending Physician: Leonardo Robledo MD  Primary Care Provider: Sowmya Mccain MD        Subjective:     Principal Problem:Toe ulcer        HPI:  Eva Bloom 57 y.o. female with afib on eliquis, HTN, CHF, ESRD on HD T/TH/S, RCC presents to the hospital chief complaint of toe wound.  She reports 1 month an ulcer to left toe that occurred after a chair rolled over foot.  He is had increasing pain to the toe described as a throbbing.  She attempted treatment at home with ice without improvement.  She is dialyzed Tuesday Thursday Saturday at [facility] in [place].  She no longer makes urine.  She denies fever chest pain shortness breast nausea vomiting abdominal pain melena hematuria hematemesis dizziness syncope.    In the ED, afebrile without leukocytosis x-ray foot without fracture or dislocation potassium 4.3 lactic acid negative heme-positive stool.      Overview/Hospital Course:  Eva Bloom 57 y.o. female with afib on eliquis, HTN, CHF, ESRD on HD T/TH/S, RCC presents to the hospital chief complaint of toe wound.  She reports 1 month an ulcer to left toe that occurred after a chair rolled over foot.  He is had increasing pain to the toe described as a throbbing.  She attempted treatment at home with ice without improvement.  She is dialyzed Tuesday Thursday Saturday at [facility].  She no longer makes urine.  She denies fever chest pain shortness breast nausea vomiting abdominal pain melena hematuria hematemesis dizziness syncope.    In the ED, afebrile without leukocytosis x-ray foot without fracture or dislocation potassium 4.3 lactic acid negative heme-positive stool.podiatry is consulted  and no plan for intervention until PAD is addressed by vascular surgery,US arterial show bilateral 50% stenosis,  S/P  S/p L AVG excision and ligation.  TDC placed by IR ( following Vascular surgery) for new HD access  Per GI patient has hemorrhoid,started on hydrocortisone,eliquis on hold at this time.HH is improved with pack of RBC.        Interval History: feeling much better today, pain is much improved. Podiatry to see     Review of Systems  Objective:     Vital Signs (Most Recent):  Temp: 97.4 °F (36.3 °C) (07/26/23 1134)  Pulse: 73 (07/26/23 1134)  Resp: 17 (07/26/23 1134)  BP: (!) 162/64 (07/26/23 1134)  SpO2: 95 % (07/26/23 1134) Vital Signs (24h Range):  Temp:  [97.2 °F (36.2 °C)-98.1 °F (36.7 °C)] 97.4 °F (36.3 °C)  Pulse:  [67-86] 73  Resp:  [17-20] 17  SpO2:  [93 %-100 %] 95 %  BP: (115-198)/() 162/64     Weight: 52.5 kg (115 lb 11.9 oz)  Body mass index is 19.87 kg/m².    Intake/Output Summary (Last 24 hours) at 7/26/2023 1500  Last data filed at 7/26/2023 1223  Gross per 24 hour   Intake 1029.39 ml   Output 1373 ml   Net -343.61 ml           Physical Exam  Constitutional:       Appearance: Normal appearance. She is not ill-appearing.   HENT:      Head: Atraumatic.   Cardiovascular:      Rate and Rhythm: Normal rate.   Pulmonary:      Effort: Pulmonary effort is normal. No respiratory distress.   Musculoskeletal:         General: No swelling.      Comments: Left foot with palpable dp pulses, neurovascularly intact   Skin:     Comments: Left 3rd digit with ulceration, wound noted   Neurological:      Mental Status: She is alert.           Significant Labs: All pertinent labs within the past 24 hours have been reviewed.  CBC:   Recent Labs   Lab 07/25/23  0505 07/26/23  0435   WBC 6.22 6.27   HGB 8.9* 9.2*   HCT 27.9* 28.6*    207       CMP:   No results for input(s): NA, K, CL, CO2, GLU, BUN, CREATININE, CALCIUM, PROT, ALBUMIN, BILITOT, ALKPHOS, AST, ALT, ANIONGAP, EGFRNONAA in the last 48 hours.    Invalid input(s): ESTGFAFRICA      Significant Imaging: reviewed.      Assessment/Plan:      * Toe  ulcer  Presents with ulcer to left toe after mechanical injury 1 month ago. Afebrile without leukocytosis. X-ray without fracture or dislocation  Podiatry consulted  NUVIA noted and Vascular consulted - underwent successful revascularization on 7/25.  Blood cultures pending neg to date.  Toe wound growing staph epidermidis  ID consulted        Hemorrhoid  Patient has a him high started on hydrocortisone with Eliquis on hold.    Monitor      Critical limb ischemia of left lower extremity with gangrene  Noted on arterial ultrasound. Vascular surgery consulted  - underwent angiogram on 7/25 - underwent successful revascularizatoin  - will discuss with Podiatry about wound to toe - to be seen today  - resume anticoagulation  (7/26) per Vascular      Hypothyroidism  Lab Results   Component Value Date    TSH 15.273 (H) 07/10/2023     Resume home synthroid has not been taking outpatient    Aortic atherosclerosis  On medical therapy.      Arteriovenous graft infection  Underwent excision/ligation of left AVG on 7/21  - cultures growing Achromobacter yxlosoxidans, staph epidermidis and yeast  - Change to meropenem and vancomycin  - ID consulted for recommendations  - treat yeast? No ID yet      PAF (paroxysmal atrial fibrillation)  Patient with Paroxysmal (<7 days) atrial fibrillation which is controlled currently with Beta Blocker.  Anticoagulation not indicated due to home eliquis held given anemia and Heme-positive stool in ED.  HH is stable, back on eliquis.    ICD (implantable cardioverter-defibrillator) in place - SubQ  Stable denies discharge    Anemia in ESRD (end-stage renal disease)  Presents with hemoglobin of 6.2. previously 8-9 about 2 weeks ago. Denies active bleeding. Heme-positive in ED.   Type and screen and transfuse 1 unit  Will hold home eliquis as heme positive   GI consulted  Repeat CBC  HH is improved.  - ok to resume eliquis    Chronic combined systolic and diastolic congestive heart failure  Patient is  identified as having Combined Systolic and Diastolic heart failure that is Chronic. CHF is currently controlled. Latest ECHO performed and demonstrates- Results for orders placed during the hospital encounter of 05/08/23    Echo Saline Bubble? No    Interpretation Summary  · The left ventricle is normal in size with mild eccentric hypertrophy and severely decreased systolic function.  · The estimated ejection fraction is 23%.  · There is severe left ventricular global hypokinesis.  · Grade II left ventricular diastolic dysfunction.  · Moderate left atrial enlargement.  · Normal right ventricular size with normal right ventricular systolic function.  · Moderate right atrial enlargement.  · There is mild aortic valve stenosis.  · Aortic valve area is 1.58 cm2; peak velocity is 2.01 m/s; mean gradient is 8 mmHg.  · Moderate mitral regurgitation.  · Moderate to severe tricuspid regurgitation.  · Mild pulmonic regurgitation.  · Elevated central venous pressure (15 mmHg).  · The estimated PA systolic pressure is 47 mmHg.  · There is mild-moderate pulmonary hypertension.  · Moderate posterolateral pericardial effusion. Trivial under the RA  . Continue Beta Blocker and ARNI and monitor clinical status closely. Monitor on telemetry. Patient is off CHF pathway.  Monitor strict Is&Os and daily weights.  Place on fluid restriction of 1 L. Cardiology has not been any consulted. Continue to stress to patient importance of self efficacy and  on diet for CHF. Last BNP reviewed- and noted below No results for input(s): BNP, BNPTRIAGEBLO in the last 168 hours..    ESRD (end stage renal disease) on dialysis  Nephrology consulted for HD,  AVG malfunction,    S/P S/p L AVG excision and ligation.  TDC placed by IR ( following Vascular surgery) for new HD access      Pulmonary hypertension  Fluid removal with HD.      History of kidney cancer  Followed by oncology outpatient. Home cabometyx held for last week due to diarrhea. C  diff negative 7/2023 though previously positive. Continue outpatient oncology follow up    Essential hypertension  Hypotensive on arrival. Home hydralazine, held. Continue home metoprolol with hold parameters for HR and BP.       VTE Risk Mitigation (From admission, onward)         Ordered     apixaban tablet 2.5 mg  2 times daily         07/25/23 1640     IP VTE HIGH RISK PATIENT  Once         07/19/23 1747     Place sequential compression device  Until discontinued         07/19/23 1747     Place PING hose  Until discontinued         07/19/23 1747                Discharge Planning   MAVIS: 7/28/2023     Code Status: Full Code   Is the patient medically ready for discharge?:     Reason for patient still in hospital (select all that apply): Treatment  Discharge Plan A: Home with family   Discharge Delays: None known at this time              Leonardo Robledo MD  Department of Hospital Medicine   [facility] Surg

## 2023-07-26 NOTE — PROGRESS NOTES
Pharmacist Renal Dose Adjustment Note    Eva Bloom is a 57 y.o. female being treated with the medication Meropenem    Patient Data:    Vital Signs (Most Recent):  Temp: 98.1 °F (36.7 °C) (07/26/23 0742)  Pulse: 74 (07/26/23 0742)  Resp: 17 (07/26/23 0742)  BP: (!) 115/55 (07/26/23 0935)  SpO2: 95 % (07/26/23 0828) Vital Signs (72h Range):  Temp:  [97.2 °F (36.2 °C)-98.7 °F (37.1 °C)]   Pulse:  []   Resp:  [11-20]   BP: (101-198)/()   SpO2:  [90 %-100 %]      Recent Labs   Lab 07/21/23  1532 07/22/23  0540 07/24/23  0730   CREATININE 4.4* 5.1* 4.8*     Serum creatinine: 4.8 mg/dL (H) 07/24/23 0730  Estimated creatinine clearance: 10.6 mL/min (A)    Medication:Meropenem 1 gram every 8 hours will be changed to Meropenem 500 mg every 24 hours    Pharmacist's Name: Eddie rBooks Jr  Pharmacist's Extension: [phone]

## 2023-07-26 NOTE — HPI
"Ms. Bloom is a 57 y.o. F with afib, HTN, CHF, ESRD on HD, RCC who presented with worsening L 3rd toe wound on 7/19.  Reports ulcer formed over a mth ago when a chair rolled over her foot.  Described worsening pain, but denies fevers, chills or systemic signs of infection.     In the ED, afebrile without leukocytosis. x-ray foot without fracture. Underwent  L AVG excision and ligation. Graft cultures grew achromobacter, s epi and yeast. Silvia graft swab cx from venous and arterial ends remain ngtd.  TDC placed by IR  for new HD access. US concerning for PAD; now s/p angio on 7/25 to LLE.     ID consulted for: "AVG wound with polymicrobial cultures and left toe ulcer." Currently on meropenem and vanc.   "

## 2023-07-26 NOTE — ASSESSMENT & PLAN NOTE
Presents with ulcer to left toe after mechanical injury 1 month ago. Afebrile without leukocytosis. X-ray without fracture or dislocation  Podiatry consulted  NUVIA noted and Vascular consulted - underwent successful revascularization on 7/25.  Blood cultures pending neg to date.  Toe wound growing staph epidermidis  ID consulted

## 2023-07-26 NOTE — PROGRESS NOTES
[facility]  Vascular Surgery  Progress Note    Patient Name: Eva Bloom  MRN: [id number]  Admission Date: 7/19/2023  Primary Care Provider: Sowmya Mccain MD    Subjective:     Interval History:  s/p LLE angio and angioplasty of AT and PT arteries.  Improvement of pain in foot since pre-op but ongoing pain.  L arm incisions stable healing well.    Post-Op Info:  Procedure(s) (LRB):  LEFT ARM ARTERIOVENOUS GRAFT EXCISION  AND LIGATION (Left)   5 Days Post-Op      Medications:  Continuous Infusions:  Scheduled Meds:   apixaban  2.5 mg Oral BID    cabozantinib  60 mg Oral Daily    cloNIDine 0.3 mg/24 hr td ptwk  1 patch Transdermal Q7 Days    epoetin david-epbx  6,000 Units Intravenous Every Tues, Thurs, Sat    hydrALAZINE  100 mg Oral Q12H    hydrocortisone   Rectal BID    levothyroxine  75 mcg Oral Daily    meropenem (MERREM) IVPB  1 g Intravenous Q8H    metoprolol succinate  50 mg Oral Daily    multivitamin  1 tablet Oral Daily    sacubitriL-valsartan  1 tablet Oral BID     PRN Meds:sodium chloride, sodium chloride 0.9%, acetaminophen, dextrose 50%, dextrose 50%, glucagon (human recombinant), glucose, glucose, HYDROmorphone, loperamide, magnesium oxide, magnesium oxide, melatonin, naloxone, ondansetron, oxyCODONE-acetaminophen, senna-docusate 8.6-50 mg, sodium chloride 0.9%, sodium chloride 0.9%, Pharmacy to dose Vancomycin consult **AND** vancomycin - pharmacy to dose     Objective:     Vital Signs (Most Recent):  Temp: 98.1 °F (36.7 °C) (07/26/23 0742)  Pulse: 74 (07/26/23 0742)  Resp: 17 (07/26/23 0742)  BP: (!) 115/55 (07/26/23 0742)  SpO2: 95 % (07/26/23 0828) Vital Signs (24h Range):  Temp:  [97.2 °F (36.2 °C)-98.1 °F (36.7 °C)] 98.1 °F (36.7 °C)  Pulse:  [62-86] 74  Resp:  [11-20] 17  SpO2:  [93 %-100 %] 95 %  BP: (115-198)/() 115/55           Physical Exam  Palpable fem pulses   Strong dopplerable PT/DP  L groin tender but dressing CDI without evidence of hematoma or pseudo    Significant  Labs:  CBC:   Recent Labs   Lab 07/26/23  0435   WBC 6.27   RBC 2.81*   HGB 9.2*   HCT 28.6*      *   MCH 32.7*   MCHC 32.2       CMP:   No results for input(s): GLU, CALCIUM, ALBUMIN, PROT, NA, K, CO2, CL, BUN, CREATININE, ALKPHOS, ALT, AST, BILITOT in the last 48 hours.    Coagulation: No results for input(s): LABPROT, INR, APTT in the last 48 hours.  eGFR: No results for input(s): EGFRIFAFRICA, EGFRCYSTC, LABGLOM in the last 48 hours.    Invalid input(s): EGFRNON-AA  Hemoglobin: No results for input(s): HGBA1C in the last 48 hours.    Significant Diagnostics:  I reviewed LE duplex and past CT scans of abdomen from earlier this month      CTA from 7/24 shows patent inf low  heavy calcifications below knee     Assessment/Plan:     Active Diagnoses:    Diagnosis Date Noted POA    PRINCIPAL PROBLEM:  Toe ulcer [L97.509] 07/19/2023 Yes    Hemorrhoid [K64.9] 07/24/2023 Yes    Critical limb ischemia of left lower extremity with gangrene [I70.262] 07/20/2023 Yes    Hypothyroidism [E03.9] 07/19/2023 Yes    Aortic atherosclerosis [I70.0] 07/06/2023 Yes    Arteriovenous graft infection [T82.7XXA] 01/24/2023 Yes    PAF (paroxysmal atrial fibrillation) [I48.0] 07/25/2022 Yes    ICD (implantable cardioverter-defibrillator) in place - SubQ [Z95.810] 07/15/2021 Yes    Anemia in ESRD (end-stage renal disease) [N18.6, D63.1]  Yes    Chronic combined systolic and diastolic congestive heart failure [I50.42]  Yes    ESRD (end stage renal disease) on dialysis [N18.6, Z99.2]  Not Applicable    Pulmonary hypertension [I27.20] 07/21/2017 Yes    History of kidney cancer [Z85.528] 09/02/2016 Not Applicable    Essential hypertension [I10] 09/23/2015 Yes      Problems Resolved During this Admission:     57 M hx of ESRD presenting with chronic  L foot toe ulcer and found to have infected L AVG s/p resection and ligation.  Arm is healing well cultures indicating no infection of remaining AVG stumps.  Also s/p successful  revascularization of LLE (AT and PT angioplasties).      -Will defer to ID for further abx course based on exposed graft cx.    -Follow up for podiatry recs for L toe ulcer.  Pt should have adequate perfusion to heal partial toe amp if needed.    -Can start heparin gtt vs restart eliquis depending on Podiatry plan  -Start ASA and statin  -Repeat TBIs/PVRs  -Consider Cardiology consult for heart failure if not recently evaluated based upon recent echo and very slow flows globally on angiogram yday  -add gabapentin for reperfusion/neuropathic pain        Obi Werner MD  Vascular Surgery  [facility] - Med Surg

## 2023-07-26 NOTE — SUBJECTIVE & OBJECTIVE
Interval History: feeling much better today, pain is much improved. Podiatry to see     Review of Systems  Objective:     Vital Signs (Most Recent):  Temp: 97.4 °F (36.3 °C) (07/26/23 1134)  Pulse: 73 (07/26/23 1134)  Resp: 17 (07/26/23 1134)  BP: (!) 162/64 (07/26/23 1134)  SpO2: 95 % (07/26/23 1134) Vital Signs (24h Range):  Temp:  [97.2 °F (36.2 °C)-98.1 °F (36.7 °C)] 97.4 °F (36.3 °C)  Pulse:  [67-86] 73  Resp:  [17-20] 17  SpO2:  [93 %-100 %] 95 %  BP: (115-198)/() 162/64     Weight: 52.5 kg (115 lb 11.9 oz)  Body mass index is 19.87 kg/m².    Intake/Output Summary (Last 24 hours) at 7/26/2023 1500  Last data filed at 7/26/2023 1223  Gross per 24 hour   Intake 1029.39 ml   Output 1373 ml   Net -343.61 ml           Physical Exam  Constitutional:       Appearance: Normal appearance. She is not ill-appearing.   HENT:      Head: Atraumatic.   Cardiovascular:      Rate and Rhythm: Normal rate.   Pulmonary:      Effort: Pulmonary effort is normal. No respiratory distress.   Musculoskeletal:         General: No swelling.      Comments: Left foot with palpable dp pulses, neurovascularly intact   Skin:     Comments: Left 3rd digit with ulceration, wound noted   Neurological:      Mental Status: She is alert.           Significant Labs: All pertinent labs within the past 24 hours have been reviewed.  CBC:   Recent Labs   Lab 07/25/23  0505 07/26/23  0435   WBC 6.22 6.27   HGB 8.9* 9.2*   HCT 27.9* 28.6*    207       CMP:   No results for input(s): NA, K, CL, CO2, GLU, BUN, CREATININE, CALCIUM, PROT, ALBUMIN, BILITOT, ALKPHOS, AST, ALT, ANIONGAP, EGFRNONAA in the last 48 hours.    Invalid input(s): ESTGFAFRICA      Significant Imaging: reviewed.

## 2023-07-26 NOTE — NURSING
[facility], [facility]  Nurses Note -- 4 Eyes      7/26/2023       Skin assessed on: Q Shift      [x] No Pressure Injuries Present    [x]Prevention Measures Documented    [] Yes LDA  for Pressure Injury Previously documented     [] Yes New Pressure Injury Discovered   [] LDA for New Pressure Injury Added      Attending RN:  Katelyn Tello, RN     Second RN:  nurse du Stewart

## 2023-07-26 NOTE — PROGRESS NOTES
Vancomycin consult follow-up:    Patient reviewed, dialysis scheduled every Tues, Thurs, Sat, no new levels, no dose today; Next levels due: random due 7/27/2023 at 0400

## 2023-07-26 NOTE — NURSING
Patient was found increasingly drowsy and hard to arouse. Vital signs were stable. Naloxone administered,per PRN order and Brice Mccray, NP. Patient is now more alert and able to communicate needs. Plan of care continue.

## 2023-07-26 NOTE — NURSING
[facility], [facility]  Nurses Note -- 4 Eyes      7/25/2023       Skin assessed on: Q Shift      [x] No Pressure Injuries Present    [x]Prevention Measures Documented    [] Yes LDA  for Pressure Injury Previously documented     [] Yes New Pressure Injury Discovered   [] LDA for New Pressure Injury Added      Attending RN:  Diana Pompa LPN     Second RN:  Marcelo Clay LPN

## 2023-07-26 NOTE — SUBJECTIVE & OBJECTIVE
Past Medical History:   Diagnosis Date    Anemia     Atrial fibrillation     Bronchitis 03/01/2017    Cancer 2016    kidney cancer    CHF (congestive heart failure), NYHA class II, chronic, systolic     CMV (cytomegalovirus) antibody positive     Encounter for blood transfusion     ESRD (end stage renal disease)     Essential hypertension 09/23/2015    H/O herpes simplex type 2 infection     Herpes simplex type 1 antibody positive     History of kidney cancer     s/p left nephrectomy 1/2016    Hyperparathyroidism, unspecified     Hyperuricemia without signs of inflammatory arthritis and tophaceous disease     Kidney stones     LGSIL (low grade squamous intraepithelial dysplasia)     Myocardiopathy 07/21/2017    Prediabetes     Proteinuria     Renal disorder     Thyroid nodule     Urate nephropathy        Past Surgical History:   Procedure Laterality Date    BREAST CYST EXCISION      COLONOSCOPY N/A 11/12/2015    COLONOSCOPY N/A 3/12/2021    Procedure: COLONOSCOPY;  Surgeon: Brendon Lanier MD;  Location: [facility] ENDO;  Service: Endoscopy;  Laterality: N/A;  covid test 3/9, labs prior, prep instr mailed -ml    INSERTION OF BIVENTRICULAR IMPLANTABLE CARDIOVERTER-DEFIBRILLATOR (ICD)  04/2021    INSERTION OF TUNNELED CENTRAL VENOUS CATHETER (CVC) WITH SUBCUTANEOUS PORT N/A 1/25/2023    Procedure: INSERTION, DUAL LUMEN CATHETER WITH PORT, WITH IMAGING GUIDANCE;  Surgeon: FARIDEH Muñoz III, MD;  Location: [facility] OR [address] [place] [address];  Service: Peripheral Vascular;  Laterality: N/A;  possinble permcath placment    NEPHRECTOMY-LAPAROSCOPIC Left 01/12/2016    PERCUTANEOUS TRANSLUMINAL ANGIOPLASTY OF ARTERIOVENOUS FISTULA Left 4/19/2023    Procedure: PTA, AV FISTULA;  Surgeon: FARIDEH Muñoz III, MD;  Location: [facility] CATH LAB;  Service: Peripheral Vascular;  Laterality: Left;    PERITONEAL CATHETER INSERTION      Permacath insertion  01/12/2017    PLACEMENT OF ARTERIOVENOUS GRAFT  12/28/2022    Procedure: INSERTION, GRAFT,  ARTERIOVENOUS;  Surgeon: FARIDEH Muñoz III, MD;  Location: [facility] OR 2ND FLR;  Service: Peripheral Vascular;;    REMOVAL, GRAFT, ARTERIOVENOUS, UPPER EXTREMITY Left 1/25/2023    Procedure: REMOVAL, GRAFT, ARTERIOVENOUS, UPPER EXTREMITY;  Surgeon: FARIDEH Muñoz III, MD;  Location: [facility] OR 2ND FLR;  Service: Peripheral Vascular;  Laterality: Left;    REVISION OF ARTERIOVENOUS FISTULA Left 5/1/2023    Procedure: REVISION, AV FISTULA;  Surgeon: FARIDEH Muñoz III, MD;  Location: [facility] OR 2ND FLR;  Service: Peripheral Vascular;  Laterality: Left;  LUE AVG revision    REVISION OF PROCEDURE INVOLVING ARTERIOVENOUS GRAFT Left 12/28/2022    Procedure: REVISION, PROCEDURE INVOLVING ARTERIOVENOUS GRAFT;  Surgeon: FARIDEH Muñoz III, MD;  Location: [facility] OR 2ND FLR;  Service: Peripheral Vascular;  Laterality: Left;    REVISION OF PROCEDURE INVOLVING ARTERIOVENOUS GRAFT Left 7/21/2023    Procedure: LEFT ARM ARTERIOVENOUS GRAFT EXCISION  AND LIGATION;  Surgeon: Obi Werner MD;  Location: [facility];  Service: Vascular;  Laterality: Left;  Left AVG excision and revision with interposition    SALPINGOOPHORECTOMY Right 2016    KJB---DAVINCI    TONSILLECTOMY      TUBAL LIGATION         Review of patient's allergies indicates:   Allergen Reactions    Coreg [carvedilol] Other (See Comments)     Nausea/vomiting    Allopurinol      Other reaction(s): abnormal transaminases       Medications:  Medications Prior to Admission   Medication Sig    apixaban (ELIQUIS) 2.5 mg Tab Take 1 tablet (2.5 mg total) by mouth 2 (two) times daily.    cabozantinib (CABOMETYX) 60 mg Tab TAKE ONE TABLET BY MOUTH ONCE DAILY AT THE SAME TIME ON AN EMPTY STOMACH AT LEAST 1 HOUR BEFORE OR 2 HOURS AFTER EATING. AVOID GRAPEFRUIT PRODUCTS    cloNIDine 0.3 mg/24 hr td ptwk (CATAPRES) 0.3 mg/24 hr Place 1 patch onto the skin every 7 days.    cyclobenzaprine (FLEXERIL) 5 MG tablet Take 1 tablet (5 mg total) by mouth daily as needed for Muscle spasms.     gabapentin (NEURONTIN) 100 MG capsule Take 1 capsule (100 mg total) by mouth once daily.    hydrALAZINE (APRESOLINE) 100 MG tablet Take 1 tablet (100 mg total) by mouth every 12 (twelve) hours.    HYDROcodone-acetaminophen (NORCO) 5-325 mg per tablet Take 1 tablet by mouth every 6 (six) hours as needed for Pain.    levothyroxine (SYNTHROID) 75 MCG tablet Take 1 tablet (75 mcg total) by mouth once daily.    lidocaine-prilocaine (EMLA) cream APPLY ATLEAST 30 MINUTES BEFORE TREATMENT 3 TIMES A WEEK    metoprolol succinate (TOPROL-XL) 100 MG 24 hr tablet Take 1/2 tablet (50mg) once daily    mv,Ca,min-folic acid-vit K1 (ONE-A-DAY WOMEN'S 50 PLUS) 400-20 mcg Tab Take 1 tablet by mouth once daily.    polyethylene glycol (GLYCOLAX) 17 gram/dose powder Take 17 g by mouth once daily.    promethazine (PHENERGAN) 25 MG tablet Take 1 tablet (25 mg total) by mouth every 6 (six) hours as needed for Nausea.    acetaminophen (TYLENOL) 500 MG tablet Take 500 mg by mouth every 6 (six) hours as needed for Pain.    naloxone (NARCAN) 1 mg/mL injection 2 mg (1 mg per nostril) by Nasal route as needed for opioid overdose; may repeat in 3 to 5 minutes if not effective. Call 911     Antibiotics (From admission, onward)      Start     Stop Route Frequency Ordered    07/27/23 0944  meropenem-0.9% sodium chloride 500 mg/50 mL IVPB         -- IV Every 24 hours (non-standard times) 07/26/23 0956    07/19/23 2100  mupirocin 2 % ointment         07/24 2059 Nasl 2 times daily 07/19/23 1826    07/19/23 1916  vancomycin - pharmacy to dose  (vancomycin IVPB (PEDS and ADULTS))        See Hyperspace for full Linked Orders Report.    -- IV pharmacy to manage frequency 07/19/23 1816          Antifungals (From admission, onward)      Start     Stop Route Frequency Ordered    07/26/23 1200  micafungin 100 mg in sodium chloride 0.9 % 100 mL IVPB (MB+)         -- IV Every 24 hours (non-standard times) 07/26/23 1049          Antivirals (From admission, onward)       None             Immunization History   Administered Date(s) Administered    COVID-19, MRNA, LN-S, PF (MODERNA FULL 0.5 ML DOSE) 01/15/2021, 02/11/2021, 11/11/2021    Hepatitis A, Adult 09/23/2015    Hepatitis B (recombinant) Adjuvanted, 2 dose 11/15/2022    Hepatitis B, Adult 09/23/2015, 03/12/2016, 07/22/2016, 09/23/2016, 02/14/2019    Influenza 09/06/2011, 10/09/2012, 09/14/2013, 10/09/2015, 09/19/2020    Influenza - Quadrivalent 09/13/2018, 09/24/2019, 09/19/2020    Influenza - Quadrivalent - PF *Preferred* (6 months and older) 09/14/2013, 10/04/2014, 10/09/2015, 10/01/2017, 09/19/2020, 09/30/2021, 10/15/2022    Influenza Split 09/06/2011, 10/09/2012, 10/09/2015    Influenza Whole 10/09/2015    PPD Test 01/14/2016    Pneumococcal Conjugate - 13 Valent 09/23/2015    Pneumococcal Polysaccharide - 23 Valent 03/28/2016, 11/09/2016    Tdap 09/23/2015    Zoster Recombinant 05/17/2021, 07/19/2021       Family History       Problem Relation (Age of Onset)    Cataracts Maternal Aunt    Diabetes Father, Maternal Grandfather    Heart disease Sister    Hypertension Mother    Kidney disease Father    No Known Problems Sister, Sister, Brother, Brother, Maternal Uncle, Paternal Aunt, Paternal Uncle, Maternal Grandmother, Paternal Grandmother, Paternal Grandfather, Son, Son, Other          Social History     Socioeconomic History    Marital status:     Number of children: 2   Occupational History    Occupation: [employer]     Employer: [id number]   Tobacco Use    Smoking status: Never    Smokeless tobacco: Never   Substance and Sexual Activity    Alcohol use: No     Comment: . 2 children. works at [employer].    Drug use: Never     Review of Systems   Constitutional:  Negative for chills and fever.   Respiratory:  Negative for cough and shortness of breath.    Cardiovascular:  Negative for chest pain and leg swelling.   Gastrointestinal:  Negative for abdominal pain, diarrhea, nausea and vomiting.    Genitourinary:  Negative for dysuria.   Musculoskeletal:  Negative for arthralgias and back pain.   Skin:  Positive for wound.   Neurological:  Negative for headaches.   Psychiatric/Behavioral:  Negative for confusion.    Objective:     Vital Signs (Most Recent):  Temp: 97.4 °F (36.3 °C) (07/26/23 1134)  Pulse: 73 (07/26/23 1134)  Resp: 17 (07/26/23 1134)  BP: (!) 162/64 (07/26/23 1134)  SpO2: 95 % (07/26/23 1134) Vital Signs (24h Range):  Temp:  [97.2 °F (36.2 °C)-98.1 °F (36.7 °C)] 97.4 °F (36.3 °C)  Pulse:  [67-86] 73  Resp:  [17-20] 17  SpO2:  [93 %-100 %] 95 %  BP: (115-198)/() 162/64     Weight: 52.5 kg (115 lb 11.9 oz)  Body mass index is 19.87 kg/m².    Estimated Creatinine Clearance: 10.6 mL/min (A) (based on SCr of 4.8 mg/dL (H)).     Physical Exam  Vitals reviewed.   Constitutional:       Comments: somnolent   HENT:      Head: Normocephalic and atraumatic.   Eyes:      Extraocular Movements: Extraocular movements intact.      Pupils: Pupils are equal, round, and reactive to light.   Cardiovascular:      Rate and Rhythm: Normal rate and regular rhythm.   Abdominal:      General: Abdomen is flat.      Palpations: Abdomen is soft.   Musculoskeletal:      Comments: L 2nd toe- discolored, eschar present  LUE- s/p graft resection- wound site without erythema or drainage  Lt sided HD tunneled cath   Skin:     General: Skin is warm and dry.   Neurological:      Mental Status: She is alert.   Psychiatric:         Mood and Affect: Mood normal.         Behavior: Behavior normal.        Significant Labs: All pertinent labs within the past 24 hours have been reviewed.    Significant Imaging: I have reviewed all pertinent imaging results/findings within the past 24 hours.

## 2023-07-26 NOTE — PLAN OF CARE
Patient AAOx4. Plan of care discussed with patient and spouse. Questions and concerns addressed. Fall/safety precautions implemented. No acute events noted this shift. Please see flowsheets for full assessment and vital signs. Bed locked in lowest position. Side rails up x2. Call bell with in reach. Will continue to monitor.      Problem: Fall Injury Risk  Goal: Absence of Fall and Fall-Related Injury  Outcome: Ongoing, Progressing  Intervention: Identify and Manage Contributors  Flowsheets (Taken 7/26/2023 1661)  Self-Care Promotion: independence encouraged     Problem: Pain Acute  Goal: Acceptable Pain Control and Functional Ability  Outcome: Ongoing, Progressing  Intervention: Develop Pain Management Plan  Flowsheets (Taken 7/26/2023 0557)  Pain Management Interventions:   quiet environment facilitated   warm blanket provided     Problem: Impaired Wound Healing  Goal: Optimal Wound Healing  Outcome: Ongoing, Progressing  Intervention: Promote Wound Healing  Flowsheets (Taken 7/26/2023 0599)  Pain Management Interventions:   quiet environment facilitated   warm blanket provided

## 2023-07-26 NOTE — ASSESSMENT & PLAN NOTE
Noted on arterial ultrasound. Vascular surgery consulted  - underwent angiogram on 7/25 - underwent successful revascularizatoin  - will discuss with Podiatry about wound to toe - to be seen today  - resume anticoagulation  (7/26) per Vascular

## 2023-07-26 NOTE — CONSULTS
[facility] Surg  Infectious Disease  Consult Note    Patient Name: Eva Bloom  MRN: [id number]  Admission Date: 7/19/2023  Hospital Length of Stay: 6 days  Attending Physician: Leonardo Robledo MD  Primary Care Provider: Sowmya Mccain MD     Isolation Status: No active isolations    Patient information was obtained from patient and ER records.      Inpatient consult to Infectious Diseases  Consult performed by: Oumou Tejada MD  Consult ordered by: Leonardo Robledo MD        Assessment/Plan:     ID  Arteriovenous graft infection  57 y.o. F with afib, HTN, CHF, ESRD on HD, RCC who presented with worsening L 3rd toe wound and erosion of LUE AVG concerning for infection s/p  L AVG excision and ligation. Graft cultures grew achromobacter, s epi and yeast. Silvia graft swab cx from venous and arterial ends remain ngtd.  TDC placed by IR  for new HD access. US concerning for PAD; now s/p angio on 7/25 to LLE.     Recommendations:  --ok to continue meropenem and vanc for now  --start namrata pending ID of yeast  --anticipate atleast 2 wk course of abx for AVG infection  --f/u podiatry plan for possible partial ray amputation          Thank you for your consult. I will follow-up with patient. Please contact us if you have any additional questions.    Oumou Tejada MD  Infectious Disease  [facility] Surg    Subjective:     Principal Problem: Toe ulcer    HPI: Ms. Bloom is a 57 y.o. F with afib, HTN, CHF, ESRD on HD, RCC who presented with worsening L 3rd toe wound on 7/19.  Reports ulcer formed over a mth ago when a chair rolled over her foot.  Described worsening pain, but denies fevers, chills or systemic signs of infection.     In the ED, afebrile without leukocytosis. x-ray foot without fracture. Underwent  L AVG excision and ligation. Graft cultures grew achromobacter, s epi and yeast. Silvia graft swab cx from venous and arterial ends remain ngtd.  TDC placed by IR  for new HD access. US  "concerning for PAD; now s/p angio on 7/25 to LLE.     ID consulted for: "AVG wound with polymicrobial cultures and left toe ulcer." Currently on meropenem and vanc.       Past Medical History:   Diagnosis Date    Anemia     Atrial fibrillation     Bronchitis 03/01/2017    Cancer 2016    kidney cancer    CHF (congestive heart failure), NYHA class II, chronic, systolic     CMV (cytomegalovirus) antibody positive     Encounter for blood transfusion     ESRD (end stage renal disease)     Essential hypertension 09/23/2015    H/O herpes simplex type 2 infection     Herpes simplex type 1 antibody positive     History of kidney cancer     s/p left nephrectomy 1/2016    Hyperparathyroidism, unspecified     Hyperuricemia without signs of inflammatory arthritis and tophaceous disease     Kidney stones     LGSIL (low grade squamous intraepithelial dysplasia)     Myocardiopathy 07/21/2017    Prediabetes     Proteinuria     Renal disorder     Thyroid nodule     Urate nephropathy        Past Surgical History:   Procedure Laterality Date    BREAST CYST EXCISION      COLONOSCOPY N/A 11/12/2015    COLONOSCOPY N/A 3/12/2021    Procedure: COLONOSCOPY;  Surgeon: Brendon Lanier MD;  Location: [facility] ENDO;  Service: Endoscopy;  Laterality: N/A;  covid test 3/9, labs prior, prep instr mailed -ml    INSERTION OF BIVENTRICULAR IMPLANTABLE CARDIOVERTER-DEFIBRILLATOR (ICD)  04/2021    INSERTION OF TUNNELED CENTRAL VENOUS CATHETER (CVC) WITH SUBCUTANEOUS PORT N/A 1/25/2023    Procedure: INSERTION, DUAL LUMEN CATHETER WITH PORT, WITH IMAGING GUIDANCE;  Surgeon: FARIDEH Muñoz III, MD;  Location: [facility] OR [address] [place] [address];  Service: Peripheral Vascular;  Laterality: N/A;  possinble permcath placment    NEPHRECTOMY-LAPAROSCOPIC Left 01/12/2016    PERCUTANEOUS TRANSLUMINAL ANGIOPLASTY OF ARTERIOVENOUS FISTULA Left 4/19/2023    Procedure: PTA, AV FISTULA;  Surgeon: FARIDEH Muñoz III, MD;  Location: [facility] CATH LAB;  Service: " Peripheral Vascular;  Laterality: Left;    PERITONEAL CATHETER INSERTION      Permacath insertion  01/12/2017    PLACEMENT OF ARTERIOVENOUS GRAFT  12/28/2022    Procedure: INSERTION, GRAFT, ARTERIOVENOUS;  Surgeon: FARIDEH Muñoz III, MD;  Location: [facility] OR [facility] FLR;  Service: Peripheral Vascular;;    REMOVAL, GRAFT, ARTERIOVENOUS, UPPER EXTREMITY Left 1/25/2023    Procedure: REMOVAL, GRAFT, ARTERIOVENOUS, UPPER EXTREMITY;  Surgeon: FARIDEH Muñoz III, MD;  [facility];  Service: Peripheral Vascular;  Laterality: Left;    REVISION OF ARTERIOVENOUS FISTULA Left 5/1/2023    Procedure: REVISION, AV FISTULA;  Surgeon: FARIDEH Muñoz III, MD;  Location: [facility] OR [facility] FLR;  Service: Peripheral Vascular;  Laterality: Left;  LUE AVG revision    REVISION OF PROCEDURE INVOLVING ARTERIOVENOUS GRAFT Left 12/28/2022    Procedure: REVISION, PROCEDURE INVOLVING ARTERIOVENOUS GRAFT;  Surgeon: FARIDEH Muñoz III, MD;  [facility];  Service: Peripheral Vascular;  Laterality: Left;    REVISION OF PROCEDURE INVOLVING ARTERIOVENOUS GRAFT Left 7/21/2023    Procedure: LEFT ARM ARTERIOVENOUS GRAFT EXCISION  AND LIGATION;  Surgeon: Obi Werner MD;  Location: [facility] OR;  Service: Vascular;  Laterality: Left;  Left AVG excision and revision with interposition    SALPINGOOPHORECTOMY Right 2016    KJB---DAVINCI    TONSILLECTOMY      TUBAL LIGATION         Review of patient's allergies indicates:   Allergen Reactions    Coreg [carvedilol] Other (See Comments)     Nausea/vomiting    Allopurinol      Other reaction(s): abnormal transaminases       Medications:  Medications Prior to Admission   Medication Sig    apixaban (ELIQUIS) 2.5 mg Tab Take 1 tablet (2.5 mg total) by mouth 2 (two) times daily.    cabozantinib (CABOMETYX) 60 mg Tab TAKE ONE TABLET BY MOUTH ONCE DAILY AT THE SAME TIME ON AN EMPTY STOMACH AT LEAST 1 HOUR BEFORE OR 2 HOURS AFTER EATING. AVOID GRAPEFRUIT PRODUCTS     cloNIDine 0.3 mg/24 hr td ptwk (CATAPRES) 0.3 mg/24 hr Place 1 patch onto the skin every 7 days.    cyclobenzaprine (FLEXERIL) 5 MG tablet Take 1 tablet (5 mg total) by mouth daily as needed for Muscle spasms.    gabapentin (NEURONTIN) 100 MG capsule Take 1 capsule (100 mg total) by mouth once daily.    hydrALAZINE (APRESOLINE) 100 MG tablet Take 1 tablet (100 mg total) by mouth every 12 (twelve) hours.    HYDROcodone-acetaminophen (NORCO) 5-325 mg per tablet Take 1 tablet by mouth every 6 (six) hours as needed for Pain.    levothyroxine (SYNTHROID) 75 MCG tablet Take 1 tablet (75 mcg total) by mouth once daily.    lidocaine-prilocaine (EMLA) cream APPLY ATLEAST 30 MINUTES BEFORE TREATMENT 3 TIMES A WEEK    metoprolol succinate (TOPROL-XL) 100 MG 24 hr tablet Take 1/2 tablet (50mg) once daily    mv,Ca,min-folic acid-vit K1 (ONE-A-DAY WOMEN'S 50 PLUS) 400-20 mcg Tab Take 1 tablet by mouth once daily.    polyethylene glycol (GLYCOLAX) 17 gram/dose powder Take 17 g by mouth once daily.    promethazine (PHENERGAN) 25 MG tablet Take 1 tablet (25 mg total) by mouth every 6 (six) hours as needed for Nausea.    acetaminophen (TYLENOL) 500 MG tablet Take 500 mg by mouth every 6 (six) hours as needed for Pain.    naloxone (NARCAN) 1 mg/mL injection 2 mg (1 mg per nostril) by Nasal route as needed for opioid overdose; may repeat in 3 to 5 minutes if not effective. Call 911     Antibiotics (From admission, onward)      Start     Stop Route Frequency Ordered    07/27/23 0944  meropenem-0.9% sodium chloride 500 mg/50 mL IVPB         -- IV Every 24 hours (non-standard times) 07/26/23 0956    07/19/23 2100  mupirocin 2 % ointment         07/24 2059 Nasl 2 times daily 07/19/23 1826 07/19/23 1916  vancomycin - pharmacy to dose  (vancomycin IVPB (PEDS and ADULTS))        See Jael for full Linked Orders Report.    -- IV pharmacy to manage frequency 07/19/23 1816          Antifungals (From admission, onward)       Start     Stop Route Frequency Ordered    07/26/23 1200  micafungin 100 mg in sodium chloride 0.9 % 100 mL IVPB (MB+)         -- IV Every 24 hours (non-standard times) 07/26/23 1049          Antivirals (From admission, onward)      None             Immunization History   Administered Date(s) Administered    COVID-19, MRNA, LN-S, PF (MODERNA FULL 0.5 ML DOSE) 01/15/2021, 02/11/2021, 11/11/2021    Hepatitis A, Adult 09/23/2015    Hepatitis B (recombinant) Adjuvanted, 2 dose 11/15/2022    Hepatitis B, Adult 09/23/2015, 03/12/2016, 07/22/2016, 09/23/2016, 02/14/2019    Influenza 09/06/2011, 10/09/2012, 09/14/2013, 10/09/2015, 09/19/2020    Influenza - Quadrivalent 09/13/2018, 09/24/2019, 09/19/2020    Influenza - Quadrivalent - PF *Preferred* (6 months and older) 09/14/2013, 10/04/2014, 10/09/2015, 10/01/2017, 09/19/2020, 09/30/2021, 10/15/2022    Influenza Split 09/06/2011, 10/09/2012, 10/09/2015    Influenza Whole 10/09/2015    PPD Test 01/14/2016    Pneumococcal Conjugate - 13 Valent 09/23/2015    Pneumococcal Polysaccharide - 23 Valent 03/28/2016, 11/09/2016    Tdap 09/23/2015    Zoster Recombinant 05/17/2021, 07/19/2021       Family History       Problem Relation (Age of Onset)    Cataracts Maternal Aunt    Diabetes Father, Maternal Grandfather    Heart disease Sister    Hypertension Mother    Kidney disease Father    No Known Problems Sister, Sister, Brother, Brother, Maternal Uncle, Paternal Aunt, Paternal Uncle, Maternal Grandmother, Paternal Grandmother, Paternal Grandfather, Son, Son, Other          Social History     Socioeconomic History    Marital status:     Number of children: 2   Occupational History    Occupation: [employer]     Employer: [id number]   Tobacco Use    Smoking status: Never    Smokeless tobacco: Never   Substance and Sexual Activity    Alcohol use: No     Comment: . 2 children. works at [employer].    Drug use: Never     Review of Systems    Constitutional:  Negative for chills and fever.   Respiratory:  Negative for cough and shortness of breath.    Cardiovascular:  Negative for chest pain and leg swelling.   Gastrointestinal:  Negative for abdominal pain, diarrhea, nausea and vomiting.   Genitourinary:  Negative for dysuria.   Musculoskeletal:  Negative for arthralgias and back pain.   Skin:  Positive for wound.   Neurological:  Negative for headaches.   Psychiatric/Behavioral:  Negative for confusion.    Objective:     Vital Signs (Most Recent):  Temp: 97.4 °F (36.3 °C) (07/26/23 1134)  Pulse: 73 (07/26/23 1134)  Resp: 17 (07/26/23 1134)  BP: (!) 162/64 (07/26/23 1134)  SpO2: 95 % (07/26/23 1134) Vital Signs (24h Range):  Temp:  [97.2 °F (36.2 °C)-98.1 °F (36.7 °C)] 97.4 °F (36.3 °C)  Pulse:  [67-86] 73  Resp:  [17-20] 17  SpO2:  [93 %-100 %] 95 %  BP: (115-198)/() 162/64     Weight: 52.5 kg (115 lb 11.9 oz)  Body mass index is 19.87 kg/m².    Estimated Creatinine Clearance: 10.6 mL/min (A) (based on SCr of 4.8 mg/dL (H)).     Physical Exam  Vitals reviewed.   Constitutional:       Comments: somnolent   HENT:      Head: Normocephalic and atraumatic.   Eyes:      Extraocular Movements: Extraocular movements intact.      Pupils: Pupils are equal, round, and reactive to light.   Cardiovascular:      Rate and Rhythm: Normal rate and regular rhythm.   Abdominal:      General: Abdomen is flat.      Palpations: Abdomen is soft.   Musculoskeletal:      Comments: L 2nd toe- discolored, eschar present  LUE- s/p graft resection- wound site without erythema or drainage  Lt sided HD tunneled cath   Skin:     General: Skin is warm and dry.   Neurological:      Mental Status: She is alert.   Psychiatric:         Mood and Affect: Mood normal.         Behavior: Behavior normal.        Significant Labs: All pertinent labs within the past 24 hours have been reviewed.    Significant Imaging: I have reviewed all pertinent imaging results/findings within the past  24 hours.

## 2023-07-26 NOTE — ASSESSMENT & PLAN NOTE
57 y.o. F with afib, HTN, CHF, ESRD on HD, RCC who presented with worsening L 3rd toe wound and erosion of LUE AVG concerning for infection s/p  L AVG excision and ligation. Graft cultures grew achromobacter, s epi and yeast. Silvia graft swab cx from venous and arterial ends remain ngtd.  TDC placed by IR  for new HD access. US concerning for PAD; now s/p angio on 7/25 to LLE.     Recommendations:  --ok to continue meropenem and vanc for now  --start namrata pending ID of yeast  --anticipate atleast 2 wk course of abx for AVG infection  --f/u podiatry plan for possible partial ray amputation

## 2023-07-27 DIAGNOSIS — I70.0 AORTIC ATHEROSCLEROSIS: Primary | ICD-10-CM

## 2023-07-27 LAB — VANCOMYCIN SERPL-MCNC: 16 UG/ML

## 2023-07-27 PROCEDURE — 80202 ASSAY OF VANCOMYCIN: CPT | Performed by: STUDENT IN AN ORGANIZED HEALTH CARE EDUCATION/TRAINING PROGRAM

## 2023-07-27 PROCEDURE — 36415 COLL VENOUS BLD VENIPUNCTURE: CPT | Performed by: STUDENT IN AN ORGANIZED HEALTH CARE EDUCATION/TRAINING PROGRAM

## 2023-07-27 PROCEDURE — 25000003 PHARM REV CODE 250: Performed by: INTERNAL MEDICINE

## 2023-07-27 PROCEDURE — 25000003 PHARM REV CODE 250: Performed by: STUDENT IN AN ORGANIZED HEALTH CARE EDUCATION/TRAINING PROGRAM

## 2023-07-27 PROCEDURE — 63600175 PHARM REV CODE 636 W HCPCS: Performed by: INTERNAL MEDICINE

## 2023-07-27 PROCEDURE — 25000003 PHARM REV CODE 250: Performed by: SURGERY

## 2023-07-27 PROCEDURE — 25000003 PHARM REV CODE 250: Performed by: PHYSICIAN ASSISTANT

## 2023-07-27 PROCEDURE — 63600175 PHARM REV CODE 636 W HCPCS: Performed by: STUDENT IN AN ORGANIZED HEALTH CARE EDUCATION/TRAINING PROGRAM

## 2023-07-27 PROCEDURE — 11000001 HC ACUTE MED/SURG PRIVATE ROOM

## 2023-07-27 PROCEDURE — 80100016 HC MAINTENANCE HEMODIALYSIS

## 2023-07-27 RX ORDER — CETIRIZINE HYDROCHLORIDE 10 MG/1
10 TABLET ORAL NIGHTLY
Status: DISCONTINUED | OUTPATIENT
Start: 2023-07-27 | End: 2023-07-29 | Stop reason: HOSPADM

## 2023-07-27 RX ADMIN — APIXABAN 2.5 MG: 2.5 TABLET, FILM COATED ORAL at 09:07

## 2023-07-27 RX ADMIN — ASPIRIN 81 MG CHEWABLE TABLET 81 MG: 81 TABLET CHEWABLE at 09:07

## 2023-07-27 RX ADMIN — CETIRIZINE HYDROCHLORIDE 10 MG: 10 TABLET, FILM COATED ORAL at 08:07

## 2023-07-27 RX ADMIN — APIXABAN 2.5 MG: 2.5 TABLET, FILM COATED ORAL at 08:07

## 2023-07-27 RX ADMIN — OXYCODONE AND ACETAMINOPHEN 1 TABLET: 7.5; 325 TABLET ORAL at 08:07

## 2023-07-27 RX ADMIN — SENNOSIDES AND DOCUSATE SODIUM 1 TABLET: 50; 8.6 TABLET ORAL at 03:07

## 2023-07-27 RX ADMIN — THERA TABS 1 TABLET: TAB at 09:07

## 2023-07-27 RX ADMIN — OXYCODONE AND ACETAMINOPHEN 1 TABLET: 7.5; 325 TABLET ORAL at 05:07

## 2023-07-27 RX ADMIN — SACUBITRIL AND VALSARTAN 1 TABLET: 24; 26 TABLET, FILM COATED ORAL at 09:07

## 2023-07-27 RX ADMIN — VANCOMYCIN HYDROCHLORIDE 500 MG: 500 INJECTION, POWDER, LYOPHILIZED, FOR SOLUTION INTRAVENOUS at 05:07

## 2023-07-27 RX ADMIN — METOPROLOL SUCCINATE 50 MG: 50 TABLET, EXTENDED RELEASE ORAL at 09:07

## 2023-07-27 RX ADMIN — HYDRALAZINE HYDROCHLORIDE 100 MG: 25 TABLET, FILM COATED ORAL at 08:07

## 2023-07-27 RX ADMIN — SACUBITRIL AND VALSARTAN 1 TABLET: 24; 26 TABLET, FILM COATED ORAL at 08:07

## 2023-07-27 RX ADMIN — EPOETIN ALFA-EPBX 6000 UNITS: 10000 INJECTION, SOLUTION INTRAVENOUS; SUBCUTANEOUS at 01:07

## 2023-07-27 RX ADMIN — MEROPENEM AND SODIUM CHLORIDE 500 MG: 500 INJECTION, SOLUTION INTRAVENOUS at 03:07

## 2023-07-27 RX ADMIN — MICAFUNGIN SODIUM 100 MG: 100 INJECTION, POWDER, LYOPHILIZED, FOR SOLUTION INTRAVENOUS at 04:07

## 2023-07-27 RX ADMIN — HYDRALAZINE HYDROCHLORIDE 100 MG: 25 TABLET, FILM COATED ORAL at 09:07

## 2023-07-27 NOTE — ASSESSMENT & PLAN NOTE
Presents with ulcer to left toe after mechanical injury 1 month ago. Afebrile without leukocytosis. X-ray without fracture or dislocation  Podiatry consulted. Plan for antibiotics and wound care at this time. Offered amputation but patient would like wound care/antibiotics.  NUVIA noted and Vascular consulted - underwent successful revascularization on 7/25.  Blood cultures pending neg to date.  Toe wound growing staph epidermidis  ID consulted

## 2023-07-27 NOTE — SUBJECTIVE & OBJECTIVE
Interval History: no acute issues, reviewed plan of care with patient and .  Notified later that patient having issues with hearing out of left year since procedure and dizziness.  states dizziness is not new     Review of Systems  Objective:     Vital Signs (Most Recent):  Temp: 97.6 °F (36.4 °C) (07/27/23 1409)  Pulse: 87 (07/27/23 1409)  Resp: 18 (07/27/23 1409)  BP: (!) 162/89 (07/27/23 1409)  SpO2: 100 % (07/27/23 1409) Vital Signs (24h Range):  Temp:  [97.4 °F (36.3 °C)-98.2 °F (36.8 °C)] 97.6 °F (36.4 °C)  Pulse:  [72-87] 87  Resp:  [14-18] 18  SpO2:  [93 %-100 %] 100 %  BP: (106-162)/(57-89) 162/89     Weight: 52.5 kg (115 lb 11.9 oz)  Body mass index is 19.87 kg/m².    Intake/Output Summary (Last 24 hours) at 7/27/2023 1617  Last data filed at 7/27/2023 1330  Gross per 24 hour   Intake 480 ml   Output 1500 ml   Net -1020 ml           Physical Exam  Constitutional:       Appearance: Normal appearance. She is not ill-appearing.   HENT:      Head: Atraumatic.      Left Ear: A middle ear effusion is present.   Cardiovascular:      Rate and Rhythm: Normal rate.   Pulmonary:      Effort: Pulmonary effort is normal. No respiratory distress.   Musculoskeletal:         General: No swelling.      Comments: Left foot with palpable dp pulses, neurovascularly intact   Skin:     Comments: Left 3rd digit with ulceration, wound noted   Neurological:      Mental Status: She is alert.           Significant Labs: All pertinent labs within the past 24 hours have been reviewed.  CBC:   Recent Labs   Lab 07/26/23  0435   WBC 6.27   HGB 9.2*   HCT 28.6*          CMP:   No results for input(s): NA, K, CL, CO2, GLU, BUN, CREATININE, CALCIUM, PROT, ALBUMIN, BILITOT, ALKPHOS, AST, ALT, ANIONGAP, EGFRNONAA in the last 48 hours.    Invalid input(s): EST[place]      Significant Imaging: reviewed.

## 2023-07-27 NOTE — PROGRESS NOTES
[facility] Surg  Podiatry  Consult Note    Patient Name: Eva Bloom  MRN: [id number]  Admission Date: 7/19/2023  Hospital Length of Stay: 7 days  Attending Physician: Leonardo Robledo MD  Primary Care Provider: Sowmya Mccain MD     Consults  Subjective:     History of Present Illness: Eva Bloom is a 57 y.o. female with  has a past medical history of Anemia, Atrial fibrillation, Bronchitis, Cancer, CHF (congestive heart failure), NYHA class II, chronic, systolic, CMV (cytomegalovirus) antibody positive, Encounter for blood transfusion, ESRD (end stage renal disease), Essential hypertension, H/O herpes simplex type 2 infection, Herpes simplex type 1 antibody positive, History of kidney cancer, Hyperparathyroidism, unspecified, Hyperuricemia without signs of inflammatory arthritis and tophaceous disease, Kidney stones, LGSIL (low grade squamous intraepithelial dysplasia), Myocardiopathy, Prediabetes, Proteinuria, Renal disorder, Thyroid nodule, and Urate nephropathy.  Consult to Podiatry for evaluation and treatment of left toe wound.  Patient relates that while barefoot a chair rolled over her left foot several weeks ago.  She attempted self-care of the subsequent injury with icing and topical triple antibiotic ointment.  She states that despite home care, the wound began to change in color and increase in pain with palpation and ambulation.  She denies seeing purulence and has not seen blood in several days.     7/21/23: Patient seen bedside. Pending AVG revision today. Patient also reports h/o fall weeks ago injuring right great toe.    7/26/23: S/p successful LLE angio per Dr. Werner     Chief Complaint   Patient presents with    Toe Injury     Pt reports a chair rolled over her left foot x2 weeks ago., Wound on top of her 3rd toe will not heal  Pt sent by home health nurse to have the wound check out.  Pt denies fever or chills.  Pt is a dialysis pt.       Scheduled Meds:   apixaban  2.5 mg  Oral BID    aspirin  81 mg Oral Daily    cabozantinib  60 mg Oral Daily    cloNIDine 0.3 mg/24 hr td ptwk  1 patch Transdermal Q7 Days    epoetin david-epbx  6,000 Units Intravenous Every Tues, Thurs, Sat    hydrALAZINE  100 mg Oral Q12H    hydrocortisone   Rectal BID    levothyroxine  75 mcg Oral Daily    meropenem (MERREM) IVPB  500 mg Intravenous Q24H    metoprolol succinate  50 mg Oral Daily    micafungin (MYCAMINE) IVPB  100 mg Intravenous Q24H    multivitamin  1 tablet Oral Daily    sacubitriL-valsartan  1 tablet Oral BID     Continuous Infusions:  PRN Meds:sodium chloride, sodium chloride 0.9%, acetaminophen, dextrose 50%, dextrose 50%, glucagon (human recombinant), glucose, glucose, HYDROmorphone, loperamide, magnesium oxide, magnesium oxide, melatonin, naloxone, ondansetron, oxyCODONE-acetaminophen, senna-docusate 8.6-50 mg, sodium chloride 0.9%, sodium chloride 0.9%, Pharmacy to dose Vancomycin consult **AND** vancomycin - pharmacy to dose    Review of patient's allergies indicates:   Allergen Reactions    Coreg [carvedilol] Other (See Comments)     Nausea/vomiting    Allopurinol      Other reaction(s): abnormal transaminases        Past Medical History:   Diagnosis Date    Anemia     Atrial fibrillation     Bronchitis 03/01/2017    Cancer 2016    kidney cancer    CHF (congestive heart failure), NYHA class II, chronic, systolic     CMV (cytomegalovirus) antibody positive     Encounter for blood transfusion     ESRD (end stage renal disease)     Essential hypertension 09/23/2015    H/O herpes simplex type 2 infection     Herpes simplex type 1 antibody positive     History of kidney cancer     s/p left nephrectomy 1/2016    Hyperparathyroidism, unspecified     Hyperuricemia without signs of inflammatory arthritis and tophaceous disease     Kidney stones     LGSIL (low grade squamous intraepithelial dysplasia)     Myocardiopathy 07/21/2017    Prediabetes     Proteinuria     Renal disorder     Thyroid nodule      Urate nephropathy      Past Surgical History:   Procedure Laterality Date    BREAST CYST EXCISION      COLONOSCOPY N/A 11/12/2015    COLONOSCOPY N/A 3/12/2021    Procedure: COLONOSCOPY;  Surgeon: Brendon Lanier MD;  Location: [facility] ENDO;  Service: Endoscopy;  Laterality: N/A;  covid test 3/9, labs prior, prep instr mailed -ml    INSERTION OF BIVENTRICULAR IMPLANTABLE CARDIOVERTER-DEFIBRILLATOR (ICD)  04/2021    INSERTION OF TUNNELED CENTRAL VENOUS CATHETER (CVC) WITH SUBCUTANEOUS PORT N/A 1/25/2023    Procedure: INSERTION, DUAL LUMEN CATHETER WITH PORT, WITH IMAGING GUIDANCE;  Surgeon: FARIDEH Muñoz III, MD;  [facility];  Service: Peripheral Vascular;  Laterality: N/A;  possinble permcath placment    NEPHRECTOMY-LAPAROSCOPIC Left 01/12/2016    PERCUTANEOUS TRANSLUMINAL ANGIOPLASTY OF ARTERIOVENOUS FISTULA Left 4/19/2023    Procedure: PTA, AV FISTULA;  Surgeon: FARIDEH Muñoz III, MD;  Location: [facility] CATH LAB;  Service: Peripheral Vascular;  Laterality: Left;    PERITONEAL CATHETER INSERTION      Permacath insertion  01/12/2017    PLACEMENT OF ARTERIOVENOUS GRAFT  12/28/2022    Procedure: INSERTION, GRAFT, ARTERIOVENOUS;  Surgeon: FARIDEH Muñoz III, MD;  [facility];  Service: Peripheral Vascular;;    REMOVAL, GRAFT, ARTERIOVENOUS, UPPER EXTREMITY Left 1/25/2023    Procedure: REMOVAL, GRAFT, ARTERIOVENOUS, UPPER EXTREMITY;  Surgeon: FARIDEH Muñoz III, MD;  Location: [facility] OR [facility] FLR;  Service: Peripheral Vascular;  Laterality: Left;    REVISION OF ARTERIOVENOUS FISTULA Left 5/1/2023    Procedure: REVISION, AV FISTULA;  Surgeon: FARIDEH Muñoz III, MD;  Location: [facility] OR [facility] FLR;  Service: Peripheral Vascular;  Laterality: Left;  LUE AVG revision    REVISION OF PROCEDURE INVOLVING ARTERIOVENOUS GRAFT Left 12/28/2022    Procedure: REVISION, PROCEDURE INVOLVING ARTERIOVENOUS GRAFT;  Surgeon: FARIDEH Muñoz III, MD;  [facility];  Service: Peripheral Vascular;   Laterality: Left;    REVISION OF PROCEDURE INVOLVING ARTERIOVENOUS GRAFT Left 7/21/2023    Procedure: LEFT ARM ARTERIOVENOUS GRAFT EXCISION  AND LIGATION;  Surgeon: Obi Werner MD;  Location: [facility] OR;  Service: Vascular;  Laterality: Left;  Left AVG excision and revision with interposition    SALPINGOOPHORECTOMY Right 2016    KJB---DAVINCI    TONSILLECTOMY      TUBAL LIGATION         Family History       Problem Relation (Age of Onset)    Cataracts Maternal Aunt    Diabetes Father, Maternal Grandfather    Heart disease Sister    Hypertension Mother    Kidney disease Father    No Known Problems Sister, Sister, Brother, Brother, Maternal Uncle, Paternal Aunt, Paternal Uncle, Maternal Grandmother, Paternal Grandmother, Paternal Grandfather, Son, Son, Other          Tobacco Use    Smoking status: Never    Smokeless tobacco: Never   Substance and Sexual Activity    Alcohol use: No     Comment: . 2 children. works at [employer].    Drug use: Never    Sexual activity: Not on file     Review of Systems   Constitutional:  Positive for activity change and fatigue. Negative for appetite change, chills and fever.   Respiratory:  Negative for cough and shortness of breath.    Cardiovascular:  Positive for leg swelling. Negative for chest pain.   Gastrointestinal:  Negative for diarrhea, nausea and vomiting.   Musculoskeletal:  Positive for arthralgias.   Skin:  Positive for color change and wound.   Neurological:  Negative for weakness.        + paresthesia    Objective:     Vital Signs (Most Recent):  Temp: 97.8 °F (36.6 °C) (07/27/23 0323)  Pulse: 80 (07/27/23 0323)  Resp: 16 (07/27/23 0545)  BP: (!) 156/76 (07/27/23 0323)  SpO2: 96 % (07/27/23 0323) Vital Signs (24h Range):  Temp:  [97.4 °F (36.3 °C)-98.1 °F (36.7 °C)] 97.8 °F (36.6 °C)  Pulse:  [72-80] 80  Resp:  [14-18] 16  SpO2:  [93 %-97 %] 96 %  BP: (106-162)/(55-87) 156/76     Weight: 52.5 kg (115 lb 11.9 oz)  Body mass index is 19.87  kg/m².    General: Pt. is thin, appears stated age, in no acute distress, alert and oriented x 3. Calm, cooperative, and communicative. Appropriate interactions and affect.    Lower Extremity Examination    Vascular: Dorsalis pedis and posterior tibial pulses are diminished Bilaterally. Toes are cool to touch. Feet are warm proximally.There is decreased digital hair. Skin is atrophic, edematous to forefoot danny    Neurologic: Light touch present. proprioception intact    Musculoskeletal: Muscle strength is 5/5 in all groups bilaterally.    Lymphatics: no lymphangitic streaking bilaterally.    Dermatologic: Skin is atrophic    7/26/23:  Stable eschar left 3rd toe.           7/21/23:  Left third toe stable.     Right hallux nail bed ecchymosis.         Ulcer location: left dorsomedial 3rd digit PIPJ  Signs of infection: local edema and erythema  Drainage: Ulcer dry  Purulence: No  Crepitus/fluctuance: No  Periwound: Reddened  Base: Necrotic eschar  Depth:  unstageable  Probe to bone: can palpates bone and joint beneath eschar            Laboratory:  A1C: No results for input(s): HGBA1C in the last 4320 hours.  CBC:   Recent Labs   Lab 07/26/23  0435   WBC 6.27   RBC 2.81*   HGB 9.2*   HCT 28.6*      *   MCH 32.7*   MCHC 32.2       CMP:   Recent Labs   Lab 07/21/23  1532 07/22/23  0540 07/24/23  0730   GLU 76   < > 83   CALCIUM 9.2   < > 9.7   ALBUMIN 2.6*  --   --    PROT 6.5  --   --       < > 137   K 4.5   < > 4.0   CO2 19*   < > 20*      < > 102   BUN 52*   < > 43*   CREATININE 4.4*   < > 4.8*   ALKPHOS 388*  --   --    ALT 54*  --   --    AST 71*  --   --    BILITOT 1.4*  --   --     < > = values in this interval not displayed.       CRP: No results for input(s): CRP in the last 168 hours.  ESR: No results for input(s): SEDRATE in the last 168 hours.  Microbiology Results (last 7 days)       Procedure Component Value Units Date/Time    Aerobic culture [[id number]]  (Abnormal)   (Susceptibility) Collected: 07/21/23 1400    Order Status: Completed Specimen: Incision site from Arm, Left Updated: 07/26/23 0804     Aerobic Bacterial Culture ACHROMOBACTER XYLOSOXIDANS SUBSP. DENTRIFICANS  Many        STAPHYLOCOCCUS EPIDERMIDIS  Many      Narrative:      exposed left arm AV graft    Fungus culture [[id number]]  (Abnormal) Collected: 07/21/23 1400    Order Status: Completed Specimen: Incision site from Arm, Left Updated: 07/25/23 1136     Fungus (Mycology) Culture YEAST   Many      Narrative:      exposed left arm AV graft    Culture, Anaerobe [[id number]] Collected: 07/21/23 1400    Order Status: Completed Specimen: Incision site from Arm, Left Updated: 07/25/23 1023     Anaerobic Culture No anaerobes isolated    Narrative:      exposed left arm AV graft    Culture, Anaerobe [[id number]] Collected: 07/21/23 1300    Order Status: Completed Specimen: Incision site from Arm, Left Updated: 07/25/23 1022     Anaerobic Culture No anaerobes isolated    Narrative:      left arm AV graft (venous end)    Culture, Anaerobe [[id number]] Collected: 07/21/23 1300    Order Status: Completed Specimen: Incision site from Arm, Left Updated: 07/25/23 1021     Anaerobic Culture No anaerobes isolated    Narrative:      left arm AV graft (arterial end)    Culture, Anaerobe [[id number]] Collected: 07/21/23 1300    Order Status: Completed Specimen: Incision site from Arm, Left Updated: 07/25/23 1020     Anaerobic Culture No anaerobes isolated    Narrative:      distal sapphire-graft fluid venous end  (x2 swabs @ same site)    Culture, Anaerobe [[id number]] Collected: 07/21/23 1300    Order Status: Completed Specimen: Incision site from Arm, Left Updated: 07/25/23 1020     Anaerobic Culture No anaerobes isolated    Narrative:      proximal sapphire-graft fluid arterial end  (x2 swabs @ same site)    Aerobic culture [[id number]] Collected: 07/21/23 1300    Order Status: Completed Specimen: Incision site from Arm, Left Updated: 07/25/23  0802     Aerobic Bacterial Culture No growth    Narrative:      left arm AV graft (venous end)    Aerobic culture [[id number]] Collected: 07/21/23 1300    Order Status: Completed Specimen: Incision site from Arm, Left Updated: 07/25/23 0759     Aerobic Bacterial Culture No growth    Narrative:      left arm AV graft (arterial end)    Aerobic culture [[id number]] Collected: 07/21/23 1300    Order Status: Completed Specimen: Incision site from Arm, Left Updated: 07/25/23 0758     Aerobic Bacterial Culture No growth    Narrative:      distal sapphire-graft fluid venous end  (x2 swabs @ same site)    Aerobic culture [[id number]] Collected: 07/21/23 1300    Order Status: Completed Specimen: Incision site from Arm, Left Updated: 07/25/23 0758     Aerobic Bacterial Culture No growth    Narrative:      proximal sapphire-graft fluid arterial end  (x2 swabs @ same site)    AFB Culture & Smear [[id number]] Collected: 07/21/23 1400    Order Status: Completed Specimen: Incision site from Arm, Left Updated: 07/24/23 1703     AFB Culture & Smear Culture in progress     AFB CULTURE STAIN No acid fast bacilli seen.    Narrative:      exposed left arm AV graft    AFB Culture & Smear [[id number]] Collected: 07/21/23 1300    Order Status: Completed Specimen: Incision site from Arm, Left Updated: 07/24/23 1703     AFB Culture & Smear Culture in progress     AFB CULTURE STAIN No acid fast bacilli seen.    Narrative:      proximal sapphire-graft fluid arterial end  (x2 swabs @ same site)    AFB Culture & Smear [[id number]] Collected: 07/21/23 1300    Order Status: Completed Specimen: Incision site from Arm, Left Updated: 07/24/23 1703     AFB Culture & Smear Culture in progress     AFB CULTURE STAIN No acid fast bacilli seen.    Narrative:      left arm AV graft (venous end)    AFB Culture & Smear [[id number]] Collected: 07/21/23 1300    Order Status: Completed Specimen: Incision site from Arm, Left Updated: 07/24/23 1703     AFB Culture & Smear Culture in  progress     AFB CULTURE STAIN No acid fast bacilli seen.    Narrative:      distal sapphire-graft fluid venous end  (x2 swabs @ same site)    AFB Culture & Smear [[id number]] Collected: 07/21/23 1300    Order Status: Completed Specimen: Incision site from Arm, Left Updated: 07/24/23 1703     AFB Culture & Smear Culture in progress     AFB CULTURE STAIN No acid fast bacilli seen.    Narrative:      left arm AV graft (arterial end)    Blood culture x two cultures. Draw prior to antibiotics. [[id number]] Collected: 07/19/23 1602    Order Status: Completed Specimen: Blood from Peripheral, Forearm, Right Updated: 07/23/23 1703     Blood Culture, Routine No Growth after 4 days.    Narrative:      Aerobic and anaerobic    Blood culture x two cultures. Draw prior to antibiotics. [[id number]] Collected: 07/19/23 1547    Order Status: Completed Specimen: Blood from Peripheral, Forearm, Right Updated: 07/23/23 1703     Blood Culture, Routine No Growth after 4 days.    Narrative:      Aerobic and anaerobic    Aerobic culture (Specify Source) **CANNOT BE ORDERED AS STAT** [[id number]]  (Abnormal)  (Susceptibility) Collected: 07/20/23 0528    Order Status: Completed Specimen: Wound from Toe, Left Foot Updated: 07/22/23 1301     Aerobic Bacterial Culture STAPHYLOCOCCUS EPIDERMIDIS  Many      Narrative:      Left middle toe    Gram stain [[id number]] Collected: 07/21/23 1400    Order Status: Completed Specimen: Incision site from Arm, Left Updated: 07/22/23 1117     Gram Stain Result Few WBC's      Moderate Gram positive cocci      Rare Gram negative rods    Narrative:      exposed left arm AV graft    Gram stain [[id number]] Collected: 07/21/23 1300    Order Status: Completed Specimen: Incision site from Arm, Left Updated: 07/22/23 1114     Gram Stain Result Few WBC's      No organisms seen    Narrative:      left arm AV graft (venous end)    Gram stain [[id number]] Collected: 07/21/23 1300    Order Status: Completed Specimen: Incision site  from Arm, Left Updated: 07/22/23 1112     Gram Stain Result Few WBC's      No organisms seen    Narrative:      left arm AV graft (arterial end)    Gram stain [[id number]] Collected: 07/21/23 1300    Order Status: Completed Specimen: Incision site from Arm, Left Updated: 07/22/23 1058     Gram Stain Result Rare WBC's      No organisms seen    Narrative:      distal sapphire-graft fluid venous end  (x2 swabs @ same site)    Gram stain [[id number]] Collected: 07/21/23 1300    Order Status: Completed Specimen: Incision site from Arm, Left Updated: 07/22/23 1057     Gram Stain Result Rare WBC's      No organisms seen    Narrative:      proximal sapphire-graft fluid arterial end  (x2 swabs @ same site)    Fungus culture [[id number]] Collected: 07/21/23 1300    Order Status: Sent Specimen: Incision site from Arm, Left Updated: 07/21/23 1748    Fungus culture [[id number]] Collected: 07/21/23 1300    Order Status: Sent Specimen: Incision site from Arm, Left Updated: 07/21/23 1722    Fungus culture [[id number]] Collected: 07/21/23 1300    Order Status: Sent Specimen: Incision site from Arm, Left Updated: 07/21/23 1712    Fungus culture [[id number]] Collected: 07/21/23 1300    Order Status: Sent Specimen: Incision site from Arm, Left Updated: 07/21/23 1706    Culture, Anaerobe [[id number]] Collected: 07/21/23 1300    Order Status: Canceled Specimen: Incision site from Arm, Left     Aerobic culture [[id number]] Collected: 07/21/23 1300    Order Status: Canceled Specimen: Incision site from Arm, Left     Fungus culture [[id number]] Collected: 07/21/23 1300    Order Status: Canceled Specimen: Incision site from Arm, Left     AFB Culture & Smear [[id number]] Collected: 07/21/23 1300    Order Status: Canceled Specimen: Incision site from Arm, Left     Gram stain [[id number]] Collected: 07/21/23 1300    Order Status: Canceled Specimen: Incision site from Arm, Left             Diagnostic Results:  Imaging Results               US Lower Extremity  Arteries Bilateral (Final result)  Result time 07/19/23 19:46:45   Procedure changed from US Lower Extrem Arteries Bilat with NUVIA (xpd)     Final result by Jael Bowers MD (07/19/23 19:46:45)                   Narrative:    EXAMINATION:  US LOWER EXTREMITY ARTERIES BILATERAL    CLINICAL HISTORY:  foot wound;    TECHNIQUE:  Bilateral spectral, color and grayscale images of the large arteries of both lower extremities were performed.    COMPARISON:  None    FINDINGS:  Peak systolic velocities involving the right lower extremity arteries (cm/sec):    CFA: 43, triphasic    DFA: 28, biphasic    Proximal SFA: 44, triphasic    Mid SFA: 62, triphasic    Distal SFA: 62, monophasic    Proximal pop: 48, monophasic    Distal pop: 47, monophasic    NATASHA: 79, monophasic    PTA: 109, monophasic    Peroneal: 51, monophasic    DPA: 81, monophasic    Peak systolic velocities involving the left lower extremity arteries (cm/sec):    CFA: 37, biphasic    DFA: 37, triphasic    Proximal SFA: 46, triphasic    Mid SFA: 61, triphasic    Distal SFA: 53, monophasic    Proximal pop: 47, monophasic    Distal pop: 50, monophasic    NATASHA: 86, monophasic    PTA: 27, monophasic    Peroneal: 54, monophasic    DPA: 108, monophasic    IMPRESSION  Doubling of velocity between at the right proximal SFA and the right PTA.  Findings suggest greater than 50% stenosis.    Monophasic flow from the right distal SFA through the right DPA suggesting severe peripheral vascular disease.    Doubling of velocity between the left popliteal artery and the left dorsal pedis artery.  Findings suggest greater than 50% stenosis.    Monophasic flow from the left distal SFA through the right DPA suggesting severe peripheral vascular disease.    Slower than expected velocities in bilateral common femoral and bilateral proximal superficial femoral arteries.    This report was flagged in Epic as abnormal.      Electronically signed by: Jael  Robinson  Date:    07/19/2023  Time:    19:46                                     X-Ray Chest 1 View (Final result)  Result time 07/19/23 18:25:09      Final result by Gustavo Ochoa MD (07/19/23 18:25:09)                   Impression:      Increased globular enlargement of the cardiac silhouette.  Differential includes cardiomegaly, pericardial effusion or both.    Increased pulmonary vasculature.  Correlate for pulmonary venous hypertension or CHF.      Electronically signed by: Gustavo Ochoa  Date:    07/19/2023  Time:    18:25               Narrative:    EXAMINATION:  XR CHEST 1 VIEW    CLINICAL HISTORY:  Anemia, unspecified    TECHNIQUE:  Single frontal view of the chest was performed.    COMPARISON:  12/18/2022    FINDINGS:  Cardiac silhouette is increased in size with a globular configuration.  AICD mild anteriorly is again noted.  The pulmonary vasculature has increased in size with reticular opacities bilaterally but no consolidation or discrete effusion.  Vascular stent in the axillary and brachial vessel on the left noted.  Bones intact.                                       X-Ray Foot Complete Left (Final result)  Result time 07/19/23 15:37:29      Final result by Asad Martin MD (07/19/23 15:37:29)                   Impression:      As above.      Electronically signed by: Asad Martin  Date:    07/19/2023  Time:    15:37               Narrative:    EXAMINATION:  XR FOOT COMPLETE 3 VIEW LEFT    CLINICAL HISTORY:  .  Pain, unspecified    TECHNIQUE:  AP, lateral and oblique views of the left foot were performed.    COMPARISON:  Radiographs 02/14/2023    FINDINGS:  Diffuse osteopenia.  No acute fracture or dislocation.  Joint spaces are preserved.  Achilles enthesopathy and small plantar calcaneal spur.  There is soft tissue swelling in the dorsal ankle and forefoot, which is nonspecific.  Extensive vascular calcifications.                                     Assessment/Plan:     Active Diagnoses:     Diagnosis Date Noted POA    PRINCIPAL PROBLEM:  Toe ulcer [L97.509] 07/19/2023 Yes    Hemorrhoid [K64.9] 07/24/2023 Yes    Critical limb ischemia of left lower extremity with gangrene [I70.262] 07/20/2023 Yes    Hypothyroidism [E03.9] 07/19/2023 Yes    Aortic atherosclerosis [I70.0] 07/06/2023 Yes    Arteriovenous graft infection [T82.7XXA] 01/24/2023 Yes    PAF (paroxysmal atrial fibrillation) [I48.0] 07/25/2022 Yes    ICD (implantable cardioverter-defibrillator) in place - SubQ [Z95.810] 07/15/2021 Yes    Anemia in ESRD (end-stage renal disease) [N18.6, D63.1]  Yes    Chronic combined systolic and diastolic congestive heart failure [I50.42]  Yes    ESRD (end stage renal disease) on dialysis [N18.6, Z99.2]  Not Applicable    Pulmonary hypertension [I27.20] 07/21/2017 Yes    History of kidney cancer [Z85.528] 09/02/2016 Not Applicable    Essential hypertension [I10] 09/23/2015 Yes      Problems Resolved During this Admission:     Education about the prevention of limb loss.    Discussed wound healing cycle, skin integrity, ways to care for skin.Counseled patient on the effects of PAOD on healing. She verbalizes understanding that it can increase the chances of delayed healing and this prolonged exposure leads to infection or progression of infection which subsequently can result in loss of limb.    Discussed options with patient left 3rd toe amputation vs. Local wound care. Patient would like to try local wound care first. Betadine applied leave open to air    S/p Successful LLE revascularization per Dr. Werner. Appreciate assistance.     Ok for discharge from podiatry standpoint.     Nursing orders for betadine paint qshift    Wound care orders left 3rd toe and right great toe. Apply betadine daily leave open to air.     F/u WB wound care first available appt. Any provider.     DARCO shoe ordered.     Thank you for your consult. I will follow-up with patient. Please contact us if you have any additional  questions.    Jael Rosario DPM  Podiatry  [facility]

## 2023-07-27 NOTE — ASSESSMENT & PLAN NOTE
Underwent excision/ligation of left AVG on 7/21  - cultures growing Achromobacter yxlosoxidans, staph epidermidis and yeast  - Change to meropenem and vancomycin  - ID consulted for recommendations  - pending yeast ID

## 2023-07-27 NOTE — NURSING
Pt /59. No BP parameters for hydralazine in order. Notified provider, SAPPHIRE Mccray NP, and 2100 dose of hydralazine held.

## 2023-07-27 NOTE — PROGRESS NOTES
Renal Progress Note    Date of Admission:  7/19/2023  3:06 PM    Length of Stay: 7  Days    Subjective: n/a    Objective:    Current Facility-Administered Medications   Medication    0.9%  NaCl infusion (for blood administration)    0.9%  NaCl infusion    acetaminophen tablet 650 mg    apixaban tablet 2.5 mg    aspirin chewable tablet 81 mg    cabozantinib Tab 60 mg    cloNIDine 0.3 mg/24 hr td ptwk 1 patch    dextrose 50% injection 12.5 g    dextrose 50% injection 25 g    epoetin david-epbx injection 6,000 Units    glucagon (human recombinant) injection 1 mg    glucose chewable tablet 16 g    glucose chewable tablet 24 g    hydrALAZINE tablet 100 mg    hydrocortisone 2.5 % rectal cream    HYDROmorphone injection 1 mg    levothyroxine tablet 75 mcg    loperamide capsule 2 mg    magnesium oxide tablet 800 mg    magnesium oxide tablet 800 mg    melatonin tablet 6 mg    meropenem-0.9% sodium chloride 500 mg/50 mL IVPB    metoprolol succinate (TOPROL-XL) 24 hr tablet 50 mg    micafungin 100 mg in sodium chloride 0.9 % 100 mL IVPB (MB+)    multivitamin tablet    naloxone 0.4 mg/mL injection 0.02 mg    ondansetron injection 8 mg    oxyCODONE-acetaminophen 7.5-325 mg per tablet 1 tablet    sacubitriL-valsartan 24-26 mg per tablet 1 tablet    senna-docusate 8.6-50 mg per tablet 1 tablet    sodium chloride 0.9% bolus 250 mL 250 mL    sodium chloride 0.9% flush 10 mL    vancomycin - pharmacy to dose     Facility-Administered Medications Ordered in Other Encounters   Medication    0.9%  NaCl infusion       Vitals:    07/27/23 0545 07/27/23 0728 07/27/23 0911 07/27/23 1000   BP:  (!) 124/58 (!) 124/58    BP Location:  Right arm     Patient Position:  Lying     Pulse:  77 77 80   Resp: 16 17  16   Temp:  97.5 °F (36.4 °C)  98.2 °F (36.8 °C)   TempSrc:  Oral  Oral   SpO2:  95%     Weight:       Height:           I/O last 3 completed shifts:  In: 769.4 [P.O.:720; IV Piggyback:49.4]  Out: -   I/O this  shift:  In: 240 [P.O.:240]  Out: -       Physical Exam:     General: NAD seen during Dialysis  Neck: supple  Heart: RRR  Lungs: unlabored breathing  Abdomen: n/a  Limbs: n/a  Neurologic: awake-alert      Laboratories:    No results for input(s): WBC, RBC, HGB, HCT, PLT, MCV, MCH, MCHC in the last 24 hours.      No results for input(s): GLUCOSE, CALCIUM, PROT, NA, K, CO2, CL, BUN, CREATININE, ALKPHOS, ALT, AST, BILITOT in the last 24 hours.    Invalid input(s):  ALBUMIN      No results for input(s): COLORU, CLARITYU, SPECGRAV, PHUR, PROTEINUA, GLUCOSEU, BLOODU, WBCU, RBCU, BACTERIA, MUCUS in the last 24 hours.    Invalid input(s):  BILIRUBINCON    Microbiology Results (last 7 days)       Procedure Component Value Units Date/Time    Aerobic culture [[id number]]  (Abnormal)  (Susceptibility) Collected: 07/21/23 1400    Order Status: Completed Specimen: Incision site from Arm, Left Updated: 07/26/23 0804     Aerobic Bacterial Culture ACHROMOBACTER XYLOSOXIDANS SUBSP. DENTRIFICANS  Many        STAPHYLOCOCCUS EPIDERMIDIS  Many      Narrative:      exposed left arm AV graft    Fungus culture [[id number]]  (Abnormal) Collected: 07/21/23 1400    Order Status: Completed Specimen: Incision site from Arm, Left Updated: 07/25/23 1136     Fungus (Mycology) Culture YEAST   Many      Narrative:      exposed left arm AV graft    Culture, Anaerobe [[id number]] Collected: 07/21/23 1400    Order Status: Completed Specimen: Incision site from Arm, Left Updated: 07/25/23 1023     Anaerobic Culture No anaerobes isolated    Narrative:      exposed left arm AV graft    Culture, Anaerobe [[id number]] Collected: 07/21/23 1300    Order Status: Completed Specimen: Incision site from Arm, Left Updated: 07/25/23 1022     Anaerobic Culture No anaerobes isolated    Narrative:      left arm AV graft (venous end)    Culture, Anaerobe [[id number]] Collected: 07/21/23 1300    Order Status: Completed Specimen: Incision site from Arm, Left Updated: 07/25/23  1021     Anaerobic Culture No anaerobes isolated    Narrative:      left arm AV graft (arterial end)    Culture, Anaerobe [[id number]] Collected: 07/21/23 1300    Order Status: Completed Specimen: Incision site from Arm, Left Updated: 07/25/23 1020     Anaerobic Culture No anaerobes isolated    Narrative:      distal sapphire-graft fluid venous end  (x2 swabs @ same site)    Culture, Anaerobe [[id number]] Collected: 07/21/23 1300    Order Status: Completed Specimen: Incision site from Arm, Left Updated: 07/25/23 1020     Anaerobic Culture No anaerobes isolated    Narrative:      proximal sapphire-graft fluid arterial end  (x2 swabs @ same site)    Aerobic culture [[id number]] Collected: 07/21/23 1300    Order Status: Completed Specimen: Incision site from Arm, Left Updated: 07/25/23 0802     Aerobic Bacterial Culture No growth    Narrative:      left arm AV graft (venous end)    Aerobic culture [[id number]] Collected: 07/21/23 1300    Order Status: Completed Specimen: Incision site from Arm, Left Updated: 07/25/23 0759     Aerobic Bacterial Culture No growth    Narrative:      left arm AV graft (arterial end)    Aerobic culture [[id number]] Collected: 07/21/23 1300    Order Status: Completed Specimen: Incision site from Arm, Left Updated: 07/25/23 0758     Aerobic Bacterial Culture No growth    Narrative:      distal sapphire-graft fluid venous end  (x2 swabs @ same site)    Aerobic culture [[id number]] Collected: 07/21/23 1300    Order Status: Completed Specimen: Incision site from Arm, Left Updated: 07/25/23 0758     Aerobic Bacterial Culture No growth    Narrative:      proximal sapphire-graft fluid arterial end  (x2 swabs @ same site)    AFB Culture & Smear [[id number]] Collected: 07/21/23 1400    Order Status: Completed Specimen: Incision site from Arm, Left Updated: 07/24/23 1703     AFB Culture & Smear Culture in progress     AFB CULTURE STAIN No acid fast bacilli seen.    Narrative:      exposed left arm AV graft    AFB Culture &  Smear [[id number]] Collected: 07/21/23 1300    Order Status: Completed Specimen: Incision site from Arm, Left Updated: 07/24/23 1703     AFB Culture & Smear Culture in progress     AFB CULTURE STAIN No acid fast bacilli seen.    Narrative:      proximal sapphire-graft fluid arterial end  (x2 swabs @ same site)    AFB Culture & Smear [[id number]] Collected: 07/21/23 1300    Order Status: Completed Specimen: Incision site from Arm, Left Updated: 07/24/23 1703     AFB Culture & Smear Culture in progress     AFB CULTURE STAIN No acid fast bacilli seen.    Narrative:      left arm AV graft (venous end)    AFB Culture & Smear [[id number]] Collected: 07/21/23 1300    Order Status: Completed Specimen: Incision site from Arm, Left Updated: 07/24/23 1703     AFB Culture & Smear Culture in progress     AFB CULTURE STAIN No acid fast bacilli seen.    Narrative:      distal sapphire-graft fluid venous end  (x2 swabs @ same site)    AFB Culture & Smear [[id number]] Collected: 07/21/23 1300    Order Status: Completed Specimen: Incision site from Arm, Left Updated: 07/24/23 1703     AFB Culture & Smear Culture in progress     AFB CULTURE STAIN No acid fast bacilli seen.    Narrative:      left arm AV graft (arterial end)    Blood culture x two cultures. Draw prior to antibiotics. [[id number]] Collected: 07/19/23 1602    Order Status: Completed Specimen: Blood from Peripheral, Forearm, Right Updated: 07/23/23 1703     Blood Culture, Routine No Growth after 4 days.    Narrative:      Aerobic and anaerobic    Blood culture x two cultures. Draw prior to antibiotics. [[id number]] Collected: 07/19/23 1547    Order Status: Completed Specimen: Blood from Peripheral, Forearm, Right Updated: 07/23/23 1703     Blood Culture, Routine No Growth after 4 days.    Narrative:      Aerobic and anaerobic    Aerobic culture (Specify Source) **CANNOT BE ORDERED AS STAT** [[id number]]  (Abnormal)  (Susceptibility) Collected: 07/20/23 0528    Order Status: Completed  Specimen: Wound from Toe, Left Foot Updated: 07/22/23 1301     Aerobic Bacterial Culture STAPHYLOCOCCUS EPIDERMIDIS  Many      Narrative:      Left middle toe    Gram stain [[id number]] Collected: 07/21/23 1400    Order Status: Completed Specimen: Incision site from Arm, Left Updated: 07/22/23 1117     Gram Stain Result Few WBC's      Moderate Gram positive cocci      Rare Gram negative rods    Narrative:      exposed left arm AV graft    Gram stain [[id number]] Collected: 07/21/23 1300    Order Status: Completed Specimen: Incision site from Arm, Left Updated: 07/22/23 1114     Gram Stain Result Few WBC's      No organisms seen    Narrative:      left arm AV graft (venous end)    Gram stain [[id number]] Collected: 07/21/23 1300    Order Status: Completed Specimen: Incision site from Arm, Left Updated: 07/22/23 1112     Gram Stain Result Few WBC's      No organisms seen    Narrative:      left arm AV graft (arterial end)    Gram stain [[id number]] Collected: 07/21/23 1300    Order Status: Completed Specimen: Incision site from Arm, Left Updated: 07/22/23 1058     Gram Stain Result Rare WBC's      No organisms seen    Narrative:      distal sapphire-graft fluid venous end  (x2 swabs @ same site)    Gram stain [[id number]] Collected: 07/21/23 1300    Order Status: Completed Specimen: Incision site from Arm, Left Updated: 07/22/23 1057     Gram Stain Result Rare WBC's      No organisms seen    Narrative:      proximal sapphire-graft fluid arterial end  (x2 swabs @ same site)    Fungus culture [[id number]] Collected: 07/21/23 1300    Order Status: Sent Specimen: Incision site from Arm, Left Updated: 07/21/23 1748    Fungus culture [[id number]] Collected: 07/21/23 1300    Order Status: Sent Specimen: Incision site from Arm, Left Updated: 07/21/23 1722    Fungus culture [[id number]] Collected: 07/21/23 1300    Order Status: Sent Specimen: Incision site from Arm, Left Updated: 07/21/23 1712    Fungus culture [[id number]] Collected: 07/21/23  1300    Order Status: Sent Specimen: Incision site from Arm, Left Updated: 07/21/23 1706    Culture, Anaerobe [[id number]] Collected: 07/21/23 1300    Order Status: Canceled Specimen: Incision site from Arm, Left     Aerobic culture [[id number]] Collected: 07/21/23 1300    Order Status: Canceled Specimen: Incision site from Arm, Left     Fungus culture [[id number]] Collected: 07/21/23 1300    Order Status: Canceled Specimen: Incision site from Arm, Left     AFB Culture & Smear [[id number]] Collected: 07/21/23 1300    Order Status: Canceled Specimen: Incision site from Arm, Left     Gram stain [[id number]] Collected: 07/21/23 1300    Order Status: Canceled Specimen: Incision site from Arm, Left               Diagnostic Tests: n/a        Assessment:    58 y/o female with ESRD on HD admitted with:    - L-Toe ulcer  - PAD LLE with gangrene  - Chronic combined HF  - PAF  - Hx. AICD  - Hx. Pulmonary HTN  - Anemia of ckd  - HTN  - AV access malfunction s/p excision and THDC placement              Plan:      - Antibiotics per admitting  - Dialysis Q TTS  - Epogen 3 x week  - Renal diet + protein supplements  - Wound care per Podiatry  - Other problems per admitting      Will follow on Dialysis days.

## 2023-07-27 NOTE — PLAN OF CARE
07/27/23 1501   Medicare Message   Important Message from Medicare regarding Discharge Appeal Rights Given to patient/caregiver;Explained to patient/caregiver;Other (comments)  (TN spoke with patient via phone, [phone], verbalized understanding, copy sent certified to address in [phone])   Date IMM was signed 07/27/23   Time IMM was signed 0291

## 2023-07-27 NOTE — PROGRESS NOTES
Pharmacokinetic Assessment Follow Up: IV Vancomycin    Vancomycin serum concentration assessment(s):    The random level was drawn correctly and can be used to guide therapy at this time. The measurement is within the desired definitive target range of 10 to 20 mcg/mL.    Vancomycin Regimen Plan:    Vancomycin 500mg scheduled post HD    Re-dose when the random level is less than 20 mcg/mL, next level to be drawn at 0400 on 7/29/23    Drug levels (last 3 results):  Recent Labs   Lab Result Units 07/25/23  0505 07/27/23  0409   Vancomycin, Random ug/mL 12.0 16.0       Pharmacy will continue to follow and monitor vancomycin.    Please contact pharmacy at extension [phone] for questions regarding this assessment.    Thank you for the consult,   Miguel De Los Santos       Patient brief summary:  Eva Bloom is a 57 y.o. female initiated on antimicrobial therapy with IV Vancomycin for treatment of skin & soft tissue infection    The patient's current regimen is random pulse dosing    Drug Allergies:   Review of patient's allergies indicates:   Allergen Reactions    Coreg [carvedilol] Other (See Comments)     Nausea/vomiting    Allopurinol      Other reaction(s): abnormal transaminases       Actual Body Weight:   52.5 kg    Renal Function:   Estimated Creatinine Clearance: 10.6 mL/min (A) (based on SCr of 4.8 mg/dL (H)).,     Dialysis Method (if applicable):  intermittent HD    CBC (last 72 hours):  Recent Labs   Lab Result Units 07/25/23  0505 07/26/23  0435   WBC K/uL 6.22 6.27   Hemoglobin g/dL 8.9* 9.2*   Hematocrit % 27.9* 28.6*   Platelets K/uL 211 207   Gran % % 70.1 79.6*   Lymph % % 11.9* 7.0*   Mono % % 12.1 9.6   Eosinophil % % 4.7 2.2   Basophil % % 0.6 0.8   Differential Method  Automated Automated       Metabolic Panel (last 72 hours):  Recent Labs   Lab Result Units 07/24/23  0730   Sodium mmol/L 137   Potassium mmol/L 4.0   Chloride mmol/L 102   CO2 mmol/L 20*   Glucose mg/dL 83   BUN mg/dL 43*    Creatinine mg/dL 4.8*       Vancomycin Administrations:  vancomycin given in the last 96 hours                     vancomycin (VANCOCIN) 500 mg in dextrose 5 % in water (D5W) 5 % 100 mL IVPB (MB+) (mg) 500 mg New Bag 07/25/23 2039                    Microbiologic Results:  Microbiology Results (last 7 days)       Procedure Component Value Units Date/Time    Aerobic culture [[id number]]  (Abnormal)  (Susceptibility) Collected: 07/21/23 1400    Order Status: Completed Specimen: Incision site from Arm, Left Updated: 07/26/23 0804     Aerobic Bacterial Culture ACHROMOBACTER XYLOSOXIDANS SUBSP. DENTRIFICANS  Many        STAPHYLOCOCCUS EPIDERMIDIS  Many      Narrative:      exposed left arm AV graft    Fungus culture [[id number]]  (Abnormal) Collected: 07/21/23 1400    Order Status: Completed Specimen: Incision site from Arm, Left Updated: 07/25/23 1136     Fungus (Mycology) Culture YEAST   Many      Narrative:      exposed left arm AV graft    Culture, Anaerobe [[id number]] Collected: 07/21/23 1400    Order Status: Completed Specimen: Incision site from Arm, Left Updated: 07/25/23 1023     Anaerobic Culture No anaerobes isolated    Narrative:      exposed left arm AV graft    Culture, Anaerobe [[id number]] Collected: 07/21/23 1300    Order Status: Completed Specimen: Incision site from Arm, Left Updated: 07/25/23 1022     Anaerobic Culture No anaerobes isolated    Narrative:      left arm AV graft (venous end)    Culture, Anaerobe [[id number]] Collected: 07/21/23 1300    Order Status: Completed Specimen: Incision site from Arm, Left Updated: 07/25/23 1021     Anaerobic Culture No anaerobes isolated    Narrative:      left arm AV graft (arterial end)    Culture, Anaerobe [[id number]] Collected: 07/21/23 1300    Order Status: Completed Specimen: Incision site from Arm, Left Updated: 07/25/23 1020     Anaerobic Culture No anaerobes isolated    Narrative:      distal sapphire-graft fluid venous end  (x2 swabs @ same site)     Culture, Anaerobe [[id number]] Collected: 07/21/23 1300    Order Status: Completed Specimen: Incision site from Arm, Left Updated: 07/25/23 1020     Anaerobic Culture No anaerobes isolated    Narrative:      proximal sapphire-graft fluid arterial end  (x2 swabs @ same site)    Aerobic culture [[id number]] Collected: 07/21/23 1300    Order Status: Completed Specimen: Incision site from Arm, Left Updated: 07/25/23 0802     Aerobic Bacterial Culture No growth    Narrative:      left arm AV graft (venous end)    Aerobic culture [[id number]] Collected: 07/21/23 1300    Order Status: Completed Specimen: Incision site from Arm, Left Updated: 07/25/23 0759     Aerobic Bacterial Culture No growth    Narrative:      left arm AV graft (arterial end)    Aerobic culture [[id number]] Collected: 07/21/23 1300    Order Status: Completed Specimen: Incision site from Arm, Left Updated: 07/25/23 0758     Aerobic Bacterial Culture No growth    Narrative:      distal sapphire-graft fluid venous end  (x2 swabs @ same site)    Aerobic culture [[id number]] Collected: 07/21/23 1300    Order Status: Completed Specimen: Incision site from Arm, Left Updated: 07/25/23 0758     Aerobic Bacterial Culture No growth    Narrative:      proximal sapphire-graft fluid arterial end  (x2 swabs @ same site)    AFB Culture & Smear [[id number]] Collected: 07/21/23 1400    Order Status: Completed Specimen: Incision site from Arm, Left Updated: 07/24/23 1703     AFB Culture & Smear Culture in progress     AFB CULTURE STAIN No acid fast bacilli seen.    Narrative:      exposed left arm AV graft    AFB Culture & Smear [[id number]] Collected: 07/21/23 1300    Order Status: Completed Specimen: Incision site from Arm, Left Updated: 07/24/23 1703     AFB Culture & Smear Culture in progress     AFB CULTURE STAIN No acid fast bacilli seen.    Narrative:      proximal sapphire-graft fluid arterial end  (x2 swabs @ same site)    AFB Culture & Smear [[id number]] Collected: 07/21/23 1300     Order Status: Completed Specimen: Incision site from Arm, Left Updated: 07/24/23 1703     AFB Culture & Smear Culture in progress     AFB CULTURE STAIN No acid fast bacilli seen.    Narrative:      left arm AV graft (venous end)    AFB Culture & Smear [[id number]] Collected: 07/21/23 1300    Order Status: Completed Specimen: Incision site from Arm, Left Updated: 07/24/23 1703     AFB Culture & Smear Culture in progress     AFB CULTURE STAIN No acid fast bacilli seen.    Narrative:      distal sapphire-graft fluid venous end  (x2 swabs @ same site)    AFB Culture & Smear [[id number]] Collected: 07/21/23 1300    Order Status: Completed Specimen: Incision site from Arm, Left Updated: 07/24/23 1703     AFB Culture & Smear Culture in progress     AFB CULTURE STAIN No acid fast bacilli seen.    Narrative:      left arm AV graft (arterial end)    Blood culture x two cultures. Draw prior to antibiotics. [[id number]] Collected: 07/19/23 1602    Order Status: Completed Specimen: Blood from Peripheral, Forearm, Right Updated: 07/23/23 1703     Blood Culture, Routine No Growth after 4 days.    Narrative:      Aerobic and anaerobic    Blood culture x two cultures. Draw prior to antibiotics. [[id number]] Collected: 07/19/23 1547    Order Status: Completed Specimen: Blood from Peripheral, Forearm, Right Updated: 07/23/23 1703     Blood Culture, Routine No Growth after 4 days.    Narrative:      Aerobic and anaerobic    Aerobic culture (Specify Source) **CANNOT BE ORDERED AS STAT** [[id number]]  (Abnormal)  (Susceptibility) Collected: 07/20/23 0528    Order Status: Completed Specimen: Wound from Toe, Left Foot Updated: 07/22/23 1301     Aerobic Bacterial Culture STAPHYLOCOCCUS EPIDERMIDIS  Many      Narrative:      Left middle toe    Gram stain [[id number]] Collected: 07/21/23 1400    Order Status: Completed Specimen: Incision site from Arm, Left Updated: 07/22/23 1117     Gram Stain Result Few WBC's      Moderate Gram positive cocci       Rare Gram negative rods    Narrative:      exposed left arm AV graft    Gram stain [[id number]] Collected: 07/21/23 1300    Order Status: Completed Specimen: Incision site from Arm, Left Updated: 07/22/23 1114     Gram Stain Result Few WBC's      No organisms seen    Narrative:      left arm AV graft (venous end)    Gram stain [[id number]] Collected: 07/21/23 1300    Order Status: Completed Specimen: Incision site from Arm, Left Updated: 07/22/23 1112     Gram Stain Result Few WBC's      No organisms seen    Narrative:      left arm AV graft (arterial end)    Gram stain [[id number]] Collected: 07/21/23 1300    Order Status: Completed Specimen: Incision site from Arm, Left Updated: 07/22/23 1058     Gram Stain Result Rare WBC's      No organisms seen    Narrative:      distal sapphire-graft fluid venous end  (x2 swabs @ same site)    Gram stain [[id number]] Collected: 07/21/23 1300    Order Status: Completed Specimen: Incision site from Arm, Left Updated: 07/22/23 1057     Gram Stain Result Rare WBC's      No organisms seen    Narrative:      proximal sapphire-graft fluid arterial end  (x2 swabs @ same site)    Fungus culture [[id number]] Collected: 07/21/23 1300    Order Status: Sent Specimen: Incision site from Arm, Left Updated: 07/21/23 1748    Fungus culture [[id number]] Collected: 07/21/23 1300    Order Status: Sent Specimen: Incision site from Arm, Left Updated: 07/21/23 1722    Fungus culture [[id number]] Collected: 07/21/23 1300    Order Status: Sent Specimen: Incision site from Arm, Left Updated: 07/21/23 1712    Fungus culture [[id number]] Collected: 07/21/23 1300    Order Status: Sent Specimen: Incision site from Arm, Left Updated: 07/21/23 1706    Culture, Anaerobe [[id number]] Collected: 07/21/23 1300    Order Status: Canceled Specimen: Incision site from Arm, Left     Aerobic culture [[id number]] Collected: 07/21/23 1300    Order Status: Canceled Specimen: Incision site from Arm, Left     Fungus culture [[id number]]  Collected: 07/21/23 1300    Order Status: Canceled Specimen: Incision site from Arm, Left     AFB Culture & Smear [[id number]] Collected: 07/21/23 1300    Order Status: Canceled Specimen: Incision site from Arm, Left     Gram stain [[id number]] Collected: 07/21/23 1300    Order Status: Canceled Specimen: Incision site from Arm, Left

## 2023-07-27 NOTE — PLAN OF CARE
Case Management Re-assessment      PCP: Sowmya Mccain  Pharmacy: [facility] on [address]     Patient Arrived From: home  Existing Help at Home: spouse     Barriers to Discharge: none      Discharge Plan:               A. Resume [facility]              B. Home with family    Patient receives  dialysis at [facility] in [place] on Tues, Thurs and Sat. Patient also receives HH services through [facility] and would like to resume upon discharge. Per ID,will likely transition to po bactrim on discharge.   07/27/23 9313   Discharge Reassessment   Assessment Type Discharge Planning Reassessment   Did the patient's condition or plan change since previous assessment? No   Discharge Plan discussed with: Patient   Communicated MAVIS with patient/caregiver Yes   DME Needed Upon Discharge  none   Transition of Care Barriers None   Why the patient remains in the hospital Requires continued medical care   Post-Acute Status   Discharge Delays None known at this time

## 2023-07-27 NOTE — PROGRESS NOTES
[facility] Surg  Vascular Surgery  Progress Note    Patient Name: Eva Bloom  MRN: [id number]  Admission Date: 7/19/2023  Primary Care Provider: Sowmya Mccain MD    Subjective:     Interval History:  s/p LLE angio and angioplasty of AT and PT arteries.  Improvement of pain in foot since pre-op but ongoing pain.  L arm incisions stable healing well.  Excised graft cx now + for yeast    Post-Op Info:  Procedure(s) (LRB):  LEFT ARM ARTERIOVENOUS GRAFT EXCISION  AND LIGATION (Left)   6 Days Post-Op      Medications:  Continuous Infusions:  Scheduled Meds:   apixaban  2.5 mg Oral BID    aspirin  81 mg Oral Daily    cabozantinib  60 mg Oral Daily    cloNIDine 0.3 mg/24 hr td ptwk  1 patch Transdermal Q7 Days    epoetin david-epbx  6,000 Units Intravenous Every Tues, Thurs, Sat    hydrALAZINE  100 mg Oral Q12H    hydrocortisone   Rectal BID    levothyroxine  75 mcg Oral Daily    meropenem (MERREM) IVPB  500 mg Intravenous Q24H    metoprolol succinate  50 mg Oral Daily    micafungin (MYCAMINE) IVPB  100 mg Intravenous Q24H    multivitamin  1 tablet Oral Daily    sacubitriL-valsartan  1 tablet Oral BID     PRN Meds:sodium chloride, sodium chloride 0.9%, acetaminophen, dextrose 50%, dextrose 50%, glucagon (human recombinant), glucose, glucose, HYDROmorphone, loperamide, magnesium oxide, magnesium oxide, melatonin, naloxone, ondansetron, oxyCODONE-acetaminophen, senna-docusate 8.6-50 mg, sodium chloride 0.9%, sodium chloride 0.9%, Pharmacy to dose Vancomycin consult **AND** vancomycin - pharmacy to dose     Objective:     Vital Signs (Most Recent):  Temp: 98.2 °F (36.8 °C) (07/27/23 1000)  Pulse: 80 (07/27/23 1000)  Resp: 16 (07/27/23 1000)  BP: (!) 124/58 (07/27/23 0911)  SpO2: 95 % (07/27/23 0728) Vital Signs (24h Range):  Temp:  [97.4 °F (36.3 °C)-98.2 °F (36.8 °C)] 98.2 °F (36.8 °C)  Pulse:  [72-80] 80  Resp:  [14-18] 16  SpO2:  [93 %-97 %] 95 %  BP: (106-156)/(57-87) 124/58     Date 07/27/23 0700 - 07/28/23 0659    Shift 1176-6506 8910-9426 7125-6439 24 Hour Total   INTAKE   P.O. 240   240   Shift Total(mL/kg) 240(4.6)   240(4.6)   OUTPUT   Shift Total(mL/kg)       Weight (kg) 52.5 52.5 52.5 52.5         Physical Exam  Palpable fem pulses   Strong dopplerable PT/DP  L groin tender but dressing CDI without evidence of hematoma or pseudo    Significant Labs:  CBC:   Recent Labs   Lab 07/26/23  0435   WBC 6.27   RBC 2.81*   HGB 9.2*   HCT 28.6*      *   MCH 32.7*   MCHC 32.2       CMP:   No results for input(s): GLU, CALCIUM, ALBUMIN, PROT, NA, K, CO2, CL, BUN, CREATININE, ALKPHOS, ALT, AST, BILITOT in the last 48 hours.    Coagulation: No results for input(s): LABPROT, INR, APTT in the last 48 hours.  eGFR: No results for input(s): EGFRIFAFRICA, EGFRCYSTC, LABGLOM in the last 48 hours.    Invalid input(s): EGFRNON-AA  Hemoglobin: No results for input(s): HGBA1C in the last 48 hours.    Significant Diagnostics:  I reviewed LE duplex and past CT scans of abdomen from earlier this month      CTA from 7/24 shows patent inf low  heavy calcifications below knee     Assessment/Plan:     Active Diagnoses:    Diagnosis Date Noted POA    PRINCIPAL PROBLEM:  Toe ulcer [L97.509] 07/19/2023 Yes    Hemorrhoid [K64.9] 07/24/2023 Yes    Critical limb ischemia of left lower extremity with gangrene [I70.262] 07/20/2023 Yes    Hypothyroidism [E03.9] 07/19/2023 Yes    Aortic atherosclerosis [I70.0] 07/06/2023 Yes    Arteriovenous graft infection [T82.7XXA] 01/24/2023 Yes    PAF (paroxysmal atrial fibrillation) [I48.0] 07/25/2022 Yes    ICD (implantable cardioverter-defibrillator) in place - SubQ [Z95.810] 07/15/2021 Yes    Anemia in ESRD (end-stage renal disease) [N18.6, D63.1]  Yes    Chronic combined systolic and diastolic congestive heart failure [I50.42]  Yes    ESRD (end stage renal disease) on dialysis [N18.6, Z99.2]  Not Applicable    Pulmonary hypertension [I27.20] 07/21/2017 Yes    History of kidney cancer [Z85.528]  09/02/2016 Not Applicable    Essential hypertension [I10] 09/23/2015 Yes      Problems Resolved During this Admission:     57 M hx of ESRD presenting with chronic  L foot toe ulcer and found to have infected L AVG s/p resection and ligation.  Arm is healing well cultures indicating no infection of remaining AVG stumps.  Also s/p successful revascularization of LLE (AT and PT angioplasties).      -Will defer to ID for further abx course based on exposed graft cx.    -Follow up for podiatry recs for L toe ulcer, agree with tentative plan for local wound care only  -Can start restart eliquis depending on Podiatry plan  -Start ASA and statin  -Consider Cardiology consult for heart failure if not recently evaluated based upon recent echo and very slow flows globally on angiogram yday  -add gabapentin for reperfusion/neuropathic pain  -Plan for f/u w vascular in clinic in 2 weeks for wound check and noninvasive studies      Obi Werner MD  Vascular Surgery  [facility] [place] [facility]

## 2023-07-27 NOTE — PLAN OF CARE
Chart reviewed.   Off the floor at time of eval.  Discussed with microlab- may be able to have an ID for yeast by dmitriy.   --continue micafungin pending ID of yeast  --continue meropenem and vanc for now; will likely transition to po bactrim to complete a 2 wk course (SSTI) on discharge.    Oumou Tejada MD  Infectious Disease

## 2023-07-27 NOTE — PROGRESS NOTES
"[facility] - Med Surg  Adult Nutrition  Progress Note    SUMMARY       Recommendations  Continue to monitor PO intake of meals and ONS Novasource Renal BID  2. Continue to monitor weights and labs  3. Collaboration with medical providers    Goals: 1) PO intakes > 50% of meals and supplements at f/u  Nutrition Goal Status: continues  Communication of RD Recs:  (POC, sticky note)    Assessment and Plan    Inadequate energy intake  R/t decreased appetite and increased needs d/t CA and ESRD   As evidenced by PO Intakes < 50% of needs x at least 1 day  Intervention: Na/Phos/K modified diet and nutrition supplement therapy  continues    Malnutrition Assessment     Skin (Micronutrient):  (toe ulcer)                                 Reason for Assessment    Reason For Assessment: identified at risk by screening criteria  Diagnosis:  (toe ulcer)  Relevant Medical History: kidney CA, ESRD on HD, anemia of chronic disease, AFIB, HTN, CHF, toe ulcer, pre-DM, kidney stones, hypothyroidism  Interdisciplinary Rounds: did not attend (remote)    General Information Comments: 56 y/o female admitted with toe ulcer. Getting HD. renal/cardiac diet ordered, PO intakes are poor. NFPE to be done by on-site RD at f/u. Wt gain per chart review ( UBW 43-47 kg).    Nutrition Discharge Planning: To be determined- Renal diet, low sodium + Novasource BID    Nutrition Risk Screen    Nutrition Risk Screen: reduced oral intake over the last month    Nutrition/Diet History    Spiritual, Cultural Beliefs, Mormon Practices, Values that Affect Care: no  Food Allergies: NKFA  Factors Affecting Nutritional Intake: decreased appetite    Anthropometrics    Temp: 98.2 °F (36.8 °C)  Height Method: Measured  Height: 5' 4" (162.6 cm)  Height (inches): 64 in  Weight Method: Bed Scale  Weight: 52.5 kg (115 lb 11.9 oz)  Weight (lb): 115.74 lb  Ideal Body Weight (IBW), Female: 120 lb  % Ideal Body Weight, Female (lb): 96.45 %  BMI (Calculated): 19.9  BMI Grade: " 18.5-24.9 - normal       Lab/Procedures/Meds    Pertinent Labs Reviewed: reviewed  BMP  Lab Results   Component Value Date     07/24/2023    K 4.0 07/24/2023     07/24/2023    CO2 20 (L) 07/24/2023    BUN 43 (H) 07/24/2023    CREATININE 4.8 (H) 07/24/2023    CALCIUM 9.7 07/24/2023    ANIONGAP 15 07/24/2023    EGFRNORACEVR 10 (A) 07/24/2023     Lab Results   Component Value Date    CALCIUM 9.7 07/24/2023    PHOS 5.3 (H) 07/20/2023     Lab Results   Component Value Date    IRON 94 07/19/2023    TRANSFERRIN 153 (L) 07/19/2023    TIBC 226 (L) 07/19/2023    FESATURATED 42 07/19/2023      No results for input(s): POCTGLUCOSE in the last 24 hours.  Lab Results   Component Value Date    HGBA1C 4.8 10/22/2022       Pertinent Medications Reviewed: reviewed  Pertinent Medications Comments: senna, Mg oxide    Estimated/Assessed Needs    Weight Used For Calorie Calculations: 52.5 kg (115 lb 11.9 oz)  Energy Calorie Requirements (kcal): 30-35 kcal/kg ( HD)= 2089-5294 kcal  Energy Need Method: Kcal/kg  Protein Requirements: 1.2-1.5 g protein/kg ( wound/HD) = 63-78 g  Weight Used For Protein Calculations: 52.5 kg (115 lb 11.9 oz)  Fluid Requirements (mL): 1500 ml or per MD ( HD)  Estimated Fluid Requirement Method: other (see comments)  CHO Requirement: 177-216 g      Nutrition Prescription Ordered    Current Diet Order: renal/cardiac diet    Evaluation of Received Nutrient/Fluid Intake    Energy Calories Required: not meeting needs  Protein Required: not meeting needs  Fluid Required: meeting needs  Tolerance: tolerating     Intake/Output Summary (Last 24 hours) at 7/27/2023 1223  Last data filed at 7/27/2023 0810  Gross per 24 hour   Intake 480 ml   Output --   Net 480 ml       % Intake of Estimated Energy Needs: 0-25%  % Meal Intake: 0-25%    Nutrition Risk    Level of Risk/Frequency of Follow-up:  (2 x weekly)     Monitor and Evaluation    Food and Nutrient Intake: energy intake, food and beverage intake  Food and  Nutrient Adminstration: diet order  Knowledge/Beliefs/Attitudes: food and nutrition knowledge/skill  Physical Activity and Function: nutrition-related ADLs and IADLs  Anthropometric Measurements: weight  Biochemical Data, Medical Tests and Procedures: electrolyte and renal panel, glucose/endocrine profile  Nutrition-Focused Physical Findings: overall appearance, skin, extremities, muscles and bones     Nutrition Follow-Up    RD Follow-up?: Yes    Yue Valdes, Registration Eligible, Provisional LDN

## 2023-07-27 NOTE — PROGRESS NOTES
[facility] Medicine  Progress Note    Patient Name: Eva Bloom  MRN: [id number]  Patient Class: IP- Inpatient   Admission Date: 7/19/2023  Length of Stay: 7 days  Attending Physician: Leonardo Robledo MD  Primary Care Provider: Sowmya Mccain MD        Subjective:     Principal Problem:Toe ulcer        HPI:  Eva Bloom 57 y.o. female with afib on eliquis, HTN, CHF, ESRD on HD T/TH/S, RCC presents to the hospital chief complaint of toe wound.  She reports 1 month an ulcer to left toe that occurred after a chair rolled over foot.  He is had increasing pain to the toe described as a throbbing.  She attempted treatment at home with ice without improvement.  She is dialyzed Tuesday Thursday Saturday at [facility] in [place].  She no longer makes urine.  She denies fever chest pain shortness breast nausea vomiting abdominal pain melena hematuria hematemesis dizziness syncope.    In the ED, afebrile without leukocytosis x-ray foot without fracture or dislocation potassium 4.3 lactic acid negative heme-positive stool.      Overview/Hospital Course:  Eva Bloom 57 y.o. female with afib on eliquis, HTN, CHF, ESRD on HD T/TH/S, RCC presents to the hospital chief complaint of toe wound.  She reports 1 month an ulcer to left toe that occurred after a chair rolled over foot.  He is had increasing pain to the toe described as a throbbing.  She attempted treatment at home with ice without improvement.  She is dialyzed Tuesday Thursday Saturday at [facility].  She no longer makes urine.  She denies fever chest pain shortness breast nausea vomiting abdominal pain melena hematuria hematemesis dizziness syncope.    In the ED, afebrile without leukocytosis x-ray foot without fracture or dislocation potassium 4.3 lactic acid negative heme-positive stool.podiatry is consulted  and no plan for intervention until PAD is addressed by vascular surgery,US arterial show bilateral 50% stenosis,  S/P  S/p L AVG excision and ligation.  TDC placed by IR ( following Vascular surgery) for new HD access  Per GI patient has hemorrhoid,started on hydrocortisone,eliquis on hold at this time.HH is improved with pack of RBC.        Interval History: no acute issues, reviewed plan of care with patient and .  Notified later that patient having issues with hearing out of left year since procedure and dizziness.  states dizziness is not new     Review of Systems  Objective:     Vital Signs (Most Recent):  Temp: 97.6 °F (36.4 °C) (07/27/23 1409)  Pulse: 87 (07/27/23 1409)  Resp: 18 (07/27/23 1409)  BP: (!) 162/89 (07/27/23 1409)  SpO2: 100 % (07/27/23 1409) Vital Signs (24h Range):  Temp:  [97.4 °F (36.3 °C)-98.2 °F (36.8 °C)] 97.6 °F (36.4 °C)  Pulse:  [72-87] 87  Resp:  [14-18] 18  SpO2:  [93 %-100 %] 100 %  BP: (106-162)/(57-89) 162/89     Weight: 52.5 kg (115 lb 11.9 oz)  Body mass index is 19.87 kg/m².    Intake/Output Summary (Last 24 hours) at 7/27/2023 1617  Last data filed at 7/27/2023 1330  Gross per 24 hour   Intake 480 ml   Output 1500 ml   Net -1020 ml           Physical Exam  Constitutional:       Appearance: Normal appearance. She is not ill-appearing.   HENT:      Head: Atraumatic.      Left Ear: A middle ear effusion is present.   Cardiovascular:      Rate and Rhythm: Normal rate.   Pulmonary:      Effort: Pulmonary effort is normal. No respiratory distress.   Musculoskeletal:         General: No swelling.      Comments: Left foot with palpable dp pulses, neurovascularly intact   Skin:     Comments: Left 3rd digit with ulceration, wound noted   Neurological:      Mental Status: She is alert.           Significant Labs: All pertinent labs within the past 24 hours have been reviewed.  CBC:   Recent Labs   Lab 07/26/23  0435   WBC 6.27   HGB 9.2*   HCT 28.6*          CMP:   No results for input(s): NA, K, CL, CO2, GLU, BUN, CREATININE, CALCIUM, PROT, ALBUMIN, BILITOT, ALKPHOS, AST, ALT,  ANIONGAP, EGFRNONAA in the last 48 hours.    Invalid input(s): ESTGFAFRICA      Significant Imaging: reviewed.      Assessment/Plan:      * Toe ulcer  Presents with ulcer to left toe after mechanical injury 1 month ago. Afebrile without leukocytosis. X-ray without fracture or dislocation  Podiatry consulted. Plan for antibiotics and wound care at this time. Offered amputation but patient would like wound care/antibiotics.  NUVIA noted and Vascular consulted - underwent successful revascularization on 7/25.  Blood cultures pending neg to date.  Toe wound growing staph epidermidis  ID consulted        Hemorrhoid  Patient has a him high started on hydrocortisone with Eliquis on hold.    Monitor      Critical limb ischemia of left lower extremity with gangrene  Noted on arterial ultrasound. Vascular surgery consulted  - underwent angiogram on 7/25 - underwent successful revascularizatoin  - will discuss with Podiatry about wound to toe - to be seen today  - resume anticoagulation  (7/26) per Vascular      Hypothyroidism  Lab Results   Component Value Date    TSH 15.273 (H) 07/10/2023     Resume home synthroid has not been taking outpatient    Aortic atherosclerosis  On medical therapy.      Arteriovenous graft infection  Underwent excision/ligation of left AVG on 7/21  - cultures growing Achromobacter yxlosoxidans, staph epidermidis and yeast  - Change to meropenem and vancomycin  - ID consulted for recommendations  - pending yeast ID      PAF (paroxysmal atrial fibrillation)  Patient with Paroxysmal (<7 days) atrial fibrillation which is controlled currently with Beta Blocker.  Anticoagulation not indicated due to home eliquis held given anemia and Heme-positive stool in ED.  HH is stable, back on eliquis.    ICD (implantable cardioverter-defibrillator) in place - SubQ  Stable denies discharge    Anemia in ESRD (end-stage renal disease)  Presents with hemoglobin of 6.2. previously 8-9 about 2 weeks ago. Denies active  bleeding. Heme-positive in ED.   Type and screen and transfuse 1 unit  Will hold home eliquis as heme positive   GI consulted  Repeat CBC  HH is improved.  - ok to resume eliquis    Chronic combined systolic and diastolic congestive heart failure  Patient is identified as having Combined Systolic and Diastolic heart failure that is Chronic. CHF is currently controlled. Latest ECHO performed and demonstrates- Results for orders placed during the hospital encounter of 05/08/23    Echo Saline Bubble? No    Interpretation Summary  · The left ventricle is normal in size with mild eccentric hypertrophy and severely decreased systolic function.  · The estimated ejection fraction is 23%.  · There is severe left ventricular global hypokinesis.  · Grade II left ventricular diastolic dysfunction.  · Moderate left atrial enlargement.  · Normal right ventricular size with normal right ventricular systolic function.  · Moderate right atrial enlargement.  · There is mild aortic valve stenosis.  · Aortic valve area is 1.58 cm2; peak velocity is 2.01 m/s; mean gradient is 8 mmHg.  · Moderate mitral regurgitation.  · Moderate to severe tricuspid regurgitation.  · Mild pulmonic regurgitation.  · Elevated central venous pressure (15 mmHg).  · The estimated PA systolic pressure is 47 mmHg.  · There is mild-moderate pulmonary hypertension.  · Moderate posterolateral pericardial effusion. Trivial under the RA  . Continue Beta Blocker and ARNI and monitor clinical status closely. Monitor on telemetry. Patient is off CHF pathway.  Monitor strict Is&Os and daily weights.  Place on fluid restriction of 1 L. Cardiology has not been any consulted. Continue to stress to patient importance of self efficacy and  on diet for CHF. Last BNP reviewed- and noted below No results for input(s): BNP, BNPTRIAGEBLO in the last 168 hours..    ESRD (end stage renal disease) on dialysis  Nephrology consulted for HD,  AVG malfunction,    S/P S/p L AVG  excision and ligation.  TDC placed by IR ( following Vascular surgery) for new HD access      Pulmonary hypertension  Fluid removal with HD.      History of kidney cancer  Followed by oncology outpatient. Home cabometyx held for last week due to diarrhea. C diff negative 7/2023 though previously positive. Continue outpatient oncology follow up    Essential hypertension  Hypotensive on arrival. Home hydralazine, held. Continue home metoprolol with hold parameters for HR and BP.       VTE Risk Mitigation (From admission, onward)         Ordered     apixaban tablet 2.5 mg  2 times daily         07/25/23 1640     IP VTE HIGH RISK PATIENT  Once         07/19/23 1747     Place sequential compression device  Until discontinued         07/19/23 1747     Place PING hose  Until discontinued         07/19/23 1747                Discharge Planning   MAVIS: 7/28/2023     Code Status: Full Code   Is the patient medically ready for discharge?:     Reason for patient still in hospital (select all that apply): Treatment  Discharge Plan A: Home with family   Discharge Delays: None known at this time              Leonardo Robledo MD  Department of Hospital Medicine   [facility] [place] [facility]

## 2023-07-27 NOTE — NURSING
Left 3rd toe and right great toe wound painted with betadine. Foam dressing applied to left 3rd toe. Tolerated well. Will cont to monitor.

## 2023-07-27 NOTE — PLAN OF CARE
Problem: Adult Inpatient Plan of Care  Goal: Plan of Care Review  Outcome: Ongoing, Progressing  Goal: Patient-Specific Goal (Individualized)  Outcome: Ongoing, Progressing  Goal: Absence of Hospital-Acquired Illness or Injury  Outcome: Ongoing, Progressing  Goal: Optimal Comfort and Wellbeing  Outcome: Ongoing, Progressing  Goal: Readiness for Transition of Care  Outcome: Ongoing, Progressing     Problem: Skin Injury Risk Increased  Goal: Skin Health and Integrity  Outcome: Ongoing, Progressing     Problem: Fall Injury Risk  Goal: Absence of Fall and Fall-Related Injury  Outcome: Ongoing, Progressing     Problem: Device-Related Complication Risk (Hemodialysis)  Goal: Safe, Effective Therapy Delivery  Outcome: Ongoing, Progressing     Problem: Hemodynamic Instability (Hemodialysis)  Goal: Effective Tissue Perfusion  Outcome: Ongoing, Progressing     Problem: Infection (Hemodialysis)  Goal: Absence of Infection Signs and Symptoms  Outcome: Ongoing, Progressing     Problem: Infection  Goal: Absence of Infection Signs and Symptoms  Outcome: Ongoing, Progressing     Problem: Pain Acute  Goal: Acceptable Pain Control and Functional Ability  Outcome: Ongoing, Progressing     Problem: Oral Intake Inadequate  Goal: Improved Oral Intake  Outcome: Ongoing, Progressing     Problem: Impaired Wound Healing  Goal: Optimal Wound Healing  Outcome: Ongoing, Progressing     Problem: Fatigue  Goal: Improved Activity Tolerance  Outcome: Ongoing, Progressing      From: Leti Cruz  To: Dominique Randall MD  Sent: 10/7/2021 11:45 AM CDT  Subject: DULOXETINE 60 MG    I need refills placed for this medication. It is due today. Thank you.

## 2023-07-27 NOTE — PLAN OF CARE
Recommendations  Continue to monitor PO intake of meals and ONS Novasource Renal BID  2. Continue to monitor weights and labs  3. Collaboration with medical providers    Goals: 1) PO intakes > 50% of meals and supplements at f/u  Nutrition Goal Status: continues  Communication of RD Recs:  (POC, sticky note)

## 2023-07-28 ENCOUNTER — EXTERNAL HOME HEALTH (OUTPATIENT)
Dept: HOME HEALTH SERVICES | Facility: HOSPITAL | Age: 58
End: 2023-07-28
Payer: MEDICARE

## 2023-07-28 PROBLEM — L97.529: Status: ACTIVE | Noted: 2023-07-19

## 2023-07-28 PROCEDURE — 63600175 PHARM REV CODE 636 W HCPCS: Performed by: STUDENT IN AN ORGANIZED HEALTH CARE EDUCATION/TRAINING PROGRAM

## 2023-07-28 PROCEDURE — 25000003 PHARM REV CODE 250: Performed by: PHYSICIAN ASSISTANT

## 2023-07-28 PROCEDURE — 63600175 PHARM REV CODE 636 W HCPCS: Performed by: INTERNAL MEDICINE

## 2023-07-28 PROCEDURE — 99024 POSTOP FOLLOW-UP VISIT: CPT | Mod: ,,, | Performed by: SURGERY

## 2023-07-28 PROCEDURE — 25000003 PHARM REV CODE 250: Performed by: STUDENT IN AN ORGANIZED HEALTH CARE EDUCATION/TRAINING PROGRAM

## 2023-07-28 PROCEDURE — 99232 PR SUBSEQUENT HOSPITAL CARE,LEVL II: ICD-10-PCS | Mod: ,,, | Performed by: INTERNAL MEDICINE

## 2023-07-28 PROCEDURE — 99232 SBSQ HOSP IP/OBS MODERATE 35: CPT | Mod: ,,, | Performed by: INTERNAL MEDICINE

## 2023-07-28 PROCEDURE — 25000003 PHARM REV CODE 250: Performed by: INTERNAL MEDICINE

## 2023-07-28 PROCEDURE — 25000003 PHARM REV CODE 250: Performed by: SURGERY

## 2023-07-28 PROCEDURE — 99024 PR POST-OP FOLLOW-UP VISIT: ICD-10-PCS | Mod: ,,, | Performed by: SURGERY

## 2023-07-28 PROCEDURE — 11000001 HC ACUTE MED/SURG PRIVATE ROOM

## 2023-07-28 RX ADMIN — MEROPENEM AND SODIUM CHLORIDE 500 MG: 500 INJECTION, SOLUTION INTRAVENOUS at 03:07

## 2023-07-28 RX ADMIN — APIXABAN 2.5 MG: 2.5 TABLET, FILM COATED ORAL at 08:07

## 2023-07-28 RX ADMIN — HYDRALAZINE HYDROCHLORIDE 100 MG: 25 TABLET, FILM COATED ORAL at 08:07

## 2023-07-28 RX ADMIN — SACUBITRIL AND VALSARTAN 1 TABLET: 24; 26 TABLET, FILM COATED ORAL at 08:07

## 2023-07-28 RX ADMIN — METOPROLOL SUCCINATE 50 MG: 50 TABLET, EXTENDED RELEASE ORAL at 08:07

## 2023-07-28 RX ADMIN — ASPIRIN 81 MG CHEWABLE TABLET 81 MG: 81 TABLET CHEWABLE at 08:07

## 2023-07-28 RX ADMIN — OXYCODONE AND ACETAMINOPHEN 1 TABLET: 7.5; 325 TABLET ORAL at 09:07

## 2023-07-28 RX ADMIN — MICAFUNGIN SODIUM 100 MG: 100 INJECTION, POWDER, LYOPHILIZED, FOR SOLUTION INTRAVENOUS at 04:07

## 2023-07-28 RX ADMIN — CETIRIZINE HYDROCHLORIDE 10 MG: 10 TABLET, FILM COATED ORAL at 08:07

## 2023-07-28 RX ADMIN — THERA TABS 1 TABLET: TAB at 08:07

## 2023-07-28 NOTE — ASSESSMENT & PLAN NOTE
Underwent excision/ligation of left AVG on 7/21  - cultures growing Achromobacter yxlosoxidans, staph epidermidis and yeast  - Change to meropenem and vancomycin  - ID consulted for recommendations  - pending yeast ID for final recommendations

## 2023-07-28 NOTE — ASSESSMENT & PLAN NOTE
57 y.o. F with afib, HTN, CHF, ESRD on HD, RCC who presented with worsening L 3rd toe wound and erosion of LUE AVG concerning for infection s/p  L AVG excision and ligation. Graft cultures grew achromobacter, s epi and yeast. Silvia graft swab cx from venous and arterial ends remain ngtd. No evidence of systemic infection, so will plan to treat for SSTI. TDC placed by IR  for new HD access. US concerning for PAD; now s/p angio on 7/25 to LLE. Plan for conservative management with local debridement.    Recommendations:  --currently on anny and vanc; transition to bactrim (renally dosed) 1DS daily (should be taken after HD on HD days) x 2 wks from graft removal. Last day 8/4/23.    --continue micafungin pending ID of yeast. (per microlab, result should be available today)

## 2023-07-28 NOTE — NURSING
[facility], [facility] [place]  Nurses Note -- 4 Eyes      7/27/2023       Skin assessed on: Q Shift      [x] No Pressure Injuries Present    []Prevention Measures Documented    [] Yes LDA  for Pressure Injury Previously documented     [] Yes New Pressure Injury Discovered   [] LDA for New Pressure Injury Added      Attending RN:  Adriana Estevez RN     Second RN:  AUSTIN Mccormick

## 2023-07-28 NOTE — NURSING
[facility], [facility]  Nurses Note -- 4 Eyes      7/28/2023       Skin assessed on: Q Shift      [] No Pressure Injuries Present    []Prevention Measures Documented    [x] Yes LDA  for Pressure Injury Previously documented   Rt foot 2nd and 3rd toe wounds   [] Yes New Pressure Injury Discovered   [] LDA for New Pressure Injury Added      Attending RN:  Kayla Conde LPN     Second RN:  CHRISTINA Wright RN

## 2023-07-28 NOTE — PROGRESS NOTES
[facility] Surg  Infectious Disease  Progress Note    Patient Name: Eva Bloom  MRN: [id number]  Admission Date: 7/19/2023  Length of Stay: 8 days  Attending Physician: Leonardo Robledo MD  Primary Care Provider: Sowmya Mccain MD    Isolation Status: No active isolations  Assessment/Plan:      ID  Arteriovenous graft infection  57 y.o. F with afib, HTN, CHF, ESRD on HD, RCC who presented with worsening L 3rd toe wound and erosion of LUE AVG concerning for infection s/p  L AVG excision and ligation. Graft cultures grew achromobacter, s epi and yeast. Silvia graft swab cx from venous and arterial ends remain ngtd. No evidence of systemic infection, so will plan to treat for SSTI. TDC placed by IR  for new HD access. US concerning for PAD; now s/p angio on 7/25 to LLE. Plan for conservative management with local debridement.    Recommendations:  --currently on anny and vanc; transition to bactrim (renally dosed) 1DS daily (should be taken after HD on HD days) x 2 wks from graft removal. Last day 8/4/23.    --continue micafungin pending ID of yeast. (per microlab, result should be available today)          Anticipated Disposition: tbd    Thank you for your consult. I will follow-up with patient. Please contact us if you have any additional questions.    Oumou Tejada MD  Infectious Disease  [facility] - Med Surg    Subjective:     Principal Problem:Toe ulcer    HPI: Ms. Bloom is a 57 y.o. F with afib, HTN, CHF, ESRD on HD, RCC who presented with worsening L 3rd toe wound on 7/19.  Reports ulcer formed over a mth ago when a chair rolled over her foot.  Described worsening pain, but denies fevers, chills or systemic signs of infection.     In the ED, afebrile without leukocytosis. x-ray foot without fracture. Underwent  L AVG excision and ligation. Graft cultures grew achromobacter, s epi and yeast. Silvia graft swab cx from venous and arterial ends remain ngtd.  TDC placed by IR  for new HD access. US  "concerning for PAD; now s/p angio on 7/25 to LLE.     ID consulted for: "AVG wound with polymicrobial cultures and left toe ulcer." Currently on meropenem and vanc.     Interval History: No acute events overnight. Feeling well.     Review of Systems   Constitutional:  Negative for chills and fever.   Respiratory:  Negative for cough and shortness of breath.    Cardiovascular:  Negative for chest pain and leg swelling.   Gastrointestinal:  Negative for abdominal pain, diarrhea, nausea and vomiting.   Genitourinary:  Negative for dysuria.   Musculoskeletal:  Negative for arthralgias and back pain.   Skin:  Positive for wound.   Neurological:  Negative for headaches.   Psychiatric/Behavioral:  Negative for confusion.    Objective:     Vital Signs (Most Recent):  Temp: 98.2 °F (36.8 °C) (07/28/23 0702)  Pulse: 87 (07/28/23 0702)  Resp: 16 (07/28/23 0924)  BP: (!) 156/68 (07/28/23 0702)  SpO2: 98 % (07/28/23 0702) Vital Signs (24h Range):  Temp:  [97.5 °F (36.4 °C)-98.4 °F (36.9 °C)] 98.2 °F (36.8 °C)  Pulse:  [82-91] 87  Resp:  [16-18] 16  SpO2:  [94 %-100 %] 98 %  BP: (122-168)/(58-95) 156/68     Weight: 52.5 kg (115 lb 11.9 oz)  Body mass index is 19.87 kg/m².    Estimated Creatinine Clearance: 10.6 mL/min (A) (based on SCr of 4.8 mg/dL (H)).     Physical Exam  Vitals reviewed.   HENT:      Head: Normocephalic and atraumatic.   Eyes:      Extraocular Movements: Extraocular movements intact.      Pupils: Pupils are equal, round, and reactive to light.   Cardiovascular:      Rate and Rhythm: Normal rate and regular rhythm.   Abdominal:      General: Abdomen is flat.      Palpations: Abdomen is soft.   Musculoskeletal:      Comments: L 2nd toe- discolored  LUE- s/p graft resection- wound site without erythema or drainage  Lt sided HD tunneled cath   Skin:     General: Skin is warm and dry.   Neurological:      Mental Status: She is alert.   Psychiatric:         Mood and Affect: Mood normal.         Behavior: Behavior " normal.        Significant Labs: Wound Culture:   Recent Labs   Lab 06/16/23  0930 07/20/23  0528 07/21/23  1300 07/21/23  1400   LABAERO MORAXELLA (BRANHAMELLA) CATARRHALIS  Many  Beta Lactamase positive  * STAPHYLOCOCCUS EPIDERMIDIS  Many  * No growth  No growth  No growth  No growth ACHROMOBACTER XYLOSOXIDANS SUBSP. DENTRIFICANS  Many  *  STAPHYLOCOCCUS EPIDERMIDIS  Many  *     All pertinent labs within the past 24 hours have been reviewed.    Significant Imaging: I have reviewed all pertinent imaging results/findings within the past 24 hours.

## 2023-07-28 NOTE — PROGRESS NOTES
[facility] Medicine  Progress Note    Patient Name: Eva Bloom  MRN: [id number]  Patient Class: IP- Inpatient   Admission Date: 7/19/2023  Length of Stay: 8 days  Attending Physician: Leonardo Robledo MD  Primary Care Provider: Sowmya Mccain MD        Subjective:     Principal Problem:Ischemic ulcer of toe of left foot, with unspecified severity        HPI:  Eva Bloom 57 y.o. female with afib on eliquis, HTN, CHF, ESRD on HD T/TH/S, RCC presents to the hospital chief complaint of toe wound.  She reports 1 month an ulcer to left toe that occurred after a chair rolled over foot.  He is had increasing pain to the toe described as a throbbing.  She attempted treatment at home with ice without improvement.  She is dialyzed Tuesday Thursday Saturday at [facility].  She no longer makes urine.  She denies fever chest pain shortness breast nausea vomiting abdominal pain melena hematuria hematemesis dizziness syncope.    In the ED, afebrile without leukocytosis x-ray foot without fracture or dislocation potassium 4.3 lactic acid negative heme-positive stool.      Overview/Hospital Course:  Eva Bloom 57 y.o. female with afib on eliquis, HTN, CHF, ESRD on HD T/TH/S, RCC presents to the hospital chief complaint of toe wound.  She reports 1 month an ulcer to left toe that occurred after a chair rolled over foot.  He is had increasing pain to the toe described as a throbbing.  She attempted treatment at home with ice without improvement.  She is dialyzed Tuesday Thursday Saturday at [facility].  She no longer makes urine.  She denies fever chest pain shortness breast nausea vomiting abdominal pain melena hematuria hematemesis dizziness syncope.    In the ED, afebrile without leukocytosis x-ray foot without fracture or dislocation potassium 4.3 lactic acid negative heme-positive stool.podiatry is consulted  and no plan for intervention until PAD is addressed by vascular  surgery,US arterial show bilateral 50% stenosis,  S/P S/p L AVG excision and ligation.  TDC placed by IR ( following Vascular surgery) for new HD access  Per GI patient has hemorrhoid,started on hydrocortisone,eliquis on hold at this time.HH is improved with pack of RBC.        Interval History: no new issues, doing well. Still awaiting final fungal cultures for dispo    Review of Systems  Objective:     Vital Signs (Most Recent):  Temp: 97.6 °F (36.4 °C) (07/28/23 1223)  Pulse: 89 (07/28/23 1223)  Resp: 16 (07/28/23 1223)  BP: 135/88 (07/28/23 1223)  SpO2: 96 % (07/28/23 1223) Vital Signs (24h Range):  Temp:  [97.5 °F (36.4 °C)-98.4 °F (36.9 °C)] 97.6 °F (36.4 °C)  Pulse:  [83-91] 89  Resp:  [16-18] 16  SpO2:  [94 %-100 %] 96 %  BP: (122-168)/(58-95) 135/88     Weight: 52.5 kg (115 lb 11.9 oz)  Body mass index is 19.87 kg/m².    Intake/Output Summary (Last 24 hours) at 7/28/2023 1331  Last data filed at 7/28/2023 1230  Gross per 24 hour   Intake 990 ml   Output 0 ml   Net 990 ml           Physical Exam  Constitutional:       Appearance: Normal appearance. She is not ill-appearing.   HENT:      Head: Atraumatic.      Left Ear: A middle ear effusion is present.   Cardiovascular:      Rate and Rhythm: Normal rate.   Pulmonary:      Effort: Pulmonary effort is normal. No respiratory distress.   Musculoskeletal:         General: No swelling.      Comments: Left foot with palpable dp pulses, neurovascularly intact   Skin:     Comments: Left 3rd digit with ulceration, wound noted   Neurological:      Mental Status: She is alert.           Significant Labs: All pertinent labs within the past 24 hours have been reviewed.  CBC:   No results for input(s): WBC, HGB, HCT, PLT in the last 48 hours.    CMP:   No results for input(s): NA, K, CL, CO2, GLU, BUN, CREATININE, CALCIUM, PROT, ALBUMIN, BILITOT, ALKPHOS, AST, ALT, ANIONGAP, EGFRNONAA in the last 48 hours.    Invalid input(s): RAFIQAFEVON      Significant Imaging:  reviewed.      Assessment/Plan:      * Ischemic ulcer of toe of left foot, with unspecified severity  Presents with ulcer to left toe after mechanical injury 1 month ago. Afebrile without leukocytosis. X-ray without fracture or dislocation  Podiatry consulted. Plan for antibiotics and wound care at this time. Offered amputation but patient would like wound care/antibiotics.  NUVIA noted and Vascular consulted - underwent successful revascularization on 7/25.  Blood cultures pending neg to date.  Toe wound growing staph epidermidis  ID consulted        Hemorrhoid  Patient has a him high started on hydrocortisone with Eliquis on hold.    Monitor      Critical limb ischemia of left lower extremity with gangrene  Noted on arterial ultrasound. Vascular surgery consulted  - underwent angiogram on 7/25 - underwent successful revascularizatoin  - will discuss with Podiatry about wound to toe - to be seen today  - resume anticoagulation  (7/26) per Vascular      Hypothyroidism  Lab Results   Component Value Date    TSH 15.273 (H) 07/10/2023     Resume home synthroid has not been taking outpatient    Aortic atherosclerosis  On medical therapy.      Arteriovenous graft infection  Underwent excision/ligation of left AVG on 7/21  - cultures growing Achromobacter yxlosoxidans, staph epidermidis and yeast  - Change to meropenem and vancomycin  - ID consulted for recommendations  - pending yeast ID for final recommendations      PAF (paroxysmal atrial fibrillation)  Patient with Paroxysmal (<7 days) atrial fibrillation which is controlled currently with Beta Blocker.  Anticoagulation not indicated due to home eliquis held given anemia and Heme-positive stool in ED.  HH is stable, back on eliquis.    ICD (implantable cardioverter-defibrillator) in place - SubQ  Stable denies discharge    Anemia in ESRD (end-stage renal disease)  Presents with hemoglobin of 6.2. previously 8-9 about 2 weeks ago. Denies active bleeding. Heme-positive in  ED.   Type and screen and transfuse 1 unit  Will hold home eliquis as heme positive   GI consulted  Repeat CBC  HH is improved.  - ok to resume eliquis    Chronic combined systolic and diastolic congestive heart failure  Patient is identified as having Combined Systolic and Diastolic heart failure that is Chronic. CHF is currently controlled. Latest ECHO performed and demonstrates- Results for orders placed during the hospital encounter of 05/08/23    Echo Saline Bubble? No    Interpretation Summary  · The left ventricle is normal in size with mild eccentric hypertrophy and severely decreased systolic function.  · The estimated ejection fraction is 23%.  · There is severe left ventricular global hypokinesis.  · Grade II left ventricular diastolic dysfunction.  · Moderate left atrial enlargement.  · Normal right ventricular size with normal right ventricular systolic function.  · Moderate right atrial enlargement.  · There is mild aortic valve stenosis.  · Aortic valve area is 1.58 cm2; peak velocity is 2.01 m/s; mean gradient is 8 mmHg.  · Moderate mitral regurgitation.  · Moderate to severe tricuspid regurgitation.  · Mild pulmonic regurgitation.  · Elevated central venous pressure (15 mmHg).  · The estimated PA systolic pressure is 47 mmHg.  · There is mild-moderate pulmonary hypertension.  · Moderate posterolateral pericardial effusion. Trivial under the RA  . Continue Beta Blocker and ARNI and monitor clinical status closely. Monitor on telemetry. Patient is off CHF pathway.  Monitor strict Is&Os and daily weights.  Place on fluid restriction of 1 L. Cardiology has not been any consulted. Continue to stress to patient importance of self efficacy and  on diet for CHF. Last BNP reviewed- and noted below No results for input(s): BNP, BNPTRIAGEBLO in the last 168 hours..    ESRD (end stage renal disease) on dialysis  Nephrology consulted for HD,  AVG malfunction,    S/P S/p L AVG excision and ligation.  TDC  placed by IR ( following Vascular surgery) for new HD access      Pulmonary hypertension  Fluid removal with HD.      History of kidney cancer  Followed by oncology outpatient. Home cabometyx held for last week due to diarrhea. C diff negative 7/2023 though previously positive. Continue outpatient oncology follow up    Essential hypertension  Hypotensive on arrival. Home hydralazine, held. Continue home metoprolol with hold parameters for HR and BP.       VTE Risk Mitigation (From admission, onward)         Ordered     apixaban tablet 2.5 mg  2 times daily         07/25/23 1640     IP VTE HIGH RISK PATIENT  Once         07/19/23 1747     Place sequential compression device  Until discontinued         07/19/23 1747     Place PING hose  Until discontinued         07/19/23 1747                Discharge Planning   MAVIS: 7/28/2023     Code Status: Full Code   Is the patient medically ready for discharge?:     Reason for patient still in hospital (select all that apply): Treatment  Discharge Plan A: Home with family   Discharge Delays: None known at this time              Leonardo Robledo MD  Department of Hospital Medicine   [facility] Surg

## 2023-07-28 NOTE — SUBJECTIVE & OBJECTIVE
Interval History: no new issues, doing well. Still awaiting final fungal cultures for dispo    Review of Systems  Objective:     Vital Signs (Most Recent):  Temp: 97.6 °F (36.4 °C) (07/28/23 1223)  Pulse: 89 (07/28/23 1223)  Resp: 16 (07/28/23 1223)  BP: 135/88 (07/28/23 1223)  SpO2: 96 % (07/28/23 1223) Vital Signs (24h Range):  Temp:  [97.5 °F (36.4 °C)-98.4 °F (36.9 °C)] 97.6 °F (36.4 °C)  Pulse:  [83-91] 89  Resp:  [16-18] 16  SpO2:  [94 %-100 %] 96 %  BP: (122-168)/(58-95) 135/88     Weight: 52.5 kg (115 lb 11.9 oz)  Body mass index is 19.87 kg/m².    Intake/Output Summary (Last 24 hours) at 7/28/2023 1331  Last data filed at 7/28/2023 1230  Gross per 24 hour   Intake 990 ml   Output 0 ml   Net 990 ml           Physical Exam  Constitutional:       Appearance: Normal appearance. She is not ill-appearing.   HENT:      Head: Atraumatic.      Left Ear: A middle ear effusion is present.   Cardiovascular:      Rate and Rhythm: Normal rate.   Pulmonary:      Effort: Pulmonary effort is normal. No respiratory distress.   Musculoskeletal:         General: No swelling.      Comments: Left foot with palpable dp pulses, neurovascularly intact   Skin:     Comments: Left 3rd digit with ulceration, wound noted   Neurological:      Mental Status: She is alert.           Significant Labs: All pertinent labs within the past 24 hours have been reviewed.  CBC:   No results for input(s): WBC, HGB, HCT, PLT in the last 48 hours.    CMP:   No results for input(s): NA, K, CL, CO2, GLU, BUN, CREATININE, CALCIUM, PROT, ALBUMIN, BILITOT, ALKPHOS, AST, ALT, ANIONGAP, EGFRNONAA in the last 48 hours.    Invalid input(s): ESTGFAFRICA      Significant Imaging: reviewed.

## 2023-07-28 NOTE — PLAN OF CARE
Problem: Adult Inpatient Plan of Care  Goal: Plan of Care Review  Outcome: Ongoing, Progressing     Problem: Adult Inpatient Plan of Care  Goal: Patient-Specific Goal (Individualized)  Outcome: Ongoing, Progressing     Problem: Adult Inpatient Plan of Care  Goal: Absence of Hospital-Acquired Illness or Injury  Outcome: Ongoing, Progressing     Problem: Adult Inpatient Plan of Care  Goal: Optimal Comfort and Wellbeing  Outcome: Ongoing, Progressing     Problem: Skin Injury Risk Increased  Goal: Skin Health and Integrity  Outcome: Ongoing, Progressing     Problem: Infection (Hemodialysis)  Goal: Absence of Infection Signs and Symptoms  Outcome: Ongoing, Progressing     Problem: Oral Intake Inadequate  Goal: Improved Oral Intake  Outcome: Ongoing, Progressing     Problem: Fatigue  Goal: Improved Activity Tolerance  Outcome: Ongoing, Progressing

## 2023-07-28 NOTE — PROGRESS NOTES
[facility]  Vascular Surgery  Progress Note    Patient Name: Eva Bloom  MRN: [id number]  Admission Date: 7/19/2023  Primary Care Provider: Sowmya Mccain MD    Subjective:     Interval History:  s/p LLE angio and angioplasty of AT and PT arteries.  Improvement of pain in foot, denies pain in foot this am.   L arm incisions stable healing well.  Excised graft cx now + for yeast, ID following    Post-Op Info:  Procedure(s) (LRB):  LEFT ARM ARTERIOVENOUS GRAFT EXCISION  AND LIGATION (Left)   7 Days Post-Op      Medications:  Continuous Infusions:  Scheduled Meds:   apixaban  2.5 mg Oral BID    aspirin  81 mg Oral Daily    cabozantinib  60 mg Oral Daily    cetirizine  10 mg Oral QHS    cloNIDine 0.3 mg/24 hr td ptwk  1 patch Transdermal Q7 Days    epoetin david-epbx  6,000 Units Intravenous Every Tues, Thurs, Sat    hydrALAZINE  100 mg Oral Q12H    hydrocortisone   Rectal BID    levothyroxine  75 mcg Oral Daily    meropenem (MERREM) IVPB  500 mg Intravenous Q24H    metoprolol succinate  50 mg Oral Daily    micafungin (MYCAMINE) IVPB  100 mg Intravenous Q24H    multivitamin  1 tablet Oral Daily    sacubitriL-valsartan  1 tablet Oral BID     PRN Meds:sodium chloride, sodium chloride 0.9%, acetaminophen, dextrose 50%, dextrose 50%, glucagon (human recombinant), glucose, glucose, HYDROmorphone, loperamide, magnesium oxide, magnesium oxide, melatonin, naloxone, ondansetron, oxyCODONE-acetaminophen, senna-docusate 8.6-50 mg, sodium chloride 0.9%, sodium chloride 0.9%, Pharmacy to dose Vancomycin consult **AND** vancomycin - pharmacy to dose     Objective:     Vital Signs (Most Recent):  Temp: 98.2 °F (36.8 °C) (07/28/23 0702)  Pulse: 87 (07/28/23 0702)  Resp: 18 (07/28/23 0702)  BP: (!) 156/68 (07/28/23 0702)  SpO2: 98 % (07/28/23 0702) Vital Signs (24h Range):  Temp:  [97.5 °F (36.4 °C)-98.4 °F (36.9 °C)] 98.2 °F (36.8 °C)  Pulse:  [77-91] 87  Resp:  [16-18] 18  SpO2:  [94 %-100 %] 98 %  BP: (122-168)/(58-95)  156/68           Physical Exam  Palpable fem pulses   Strong dopplerable PT/DP  L groin tender but dressing CDI without evidence of hematoma or pseudo    Significant Labs:  CBC: No results for input(s): WBC, RBC, HGB, HCT, PLT, MCV, MCH, MCHC in the last 48 hours.    CMP:   No results for input(s): GLU, CALCIUM, ALBUMIN, PROT, NA, K, CO2, CL, BUN, CREATININE, ALKPHOS, ALT, AST, BILITOT in the last 48 hours.    Coagulation: No results for input(s): LABPROT, INR, APTT in the last 48 hours.  eGFR: No results for input(s): EGFRIFAFRICA, EGFRCYSTC, LABGLOM in the last 48 hours.    Invalid input(s): EGFRNON-AA  Hemoglobin: No results for input(s): HGBA1C in the last 48 hours.    Significant Diagnostics:  I reviewed LE duplex and past CT scans of abdomen from earlier this month      CTA from 7/24 shows patent inf low  heavy calcifications below knee     Assessment/Plan:     Active Diagnoses:    Diagnosis Date Noted POA    PRINCIPAL PROBLEM:  Toe ulcer [L97.509] 07/19/2023 Yes    Hemorrhoid [K64.9] 07/24/2023 Yes    Critical limb ischemia of left lower extremity with gangrene [I70.262] 07/20/2023 Yes    Hypothyroidism [E03.9] 07/19/2023 Yes    Aortic atherosclerosis [I70.0] 07/06/2023 Yes    Arteriovenous graft infection [T82.7XXA] 01/24/2023 Yes    PAF (paroxysmal atrial fibrillation) [I48.0] 07/25/2022 Yes    ICD (implantable cardioverter-defibrillator) in place - SubQ [Z95.810] 07/15/2021 Yes    Anemia in ESRD (end-stage renal disease) [N18.6, D63.1]  Yes    Chronic combined systolic and diastolic congestive heart failure [I50.42]  Yes    ESRD (end stage renal disease) on dialysis [N18.6, Z99.2]  Not Applicable    Pulmonary hypertension [I27.20] 07/21/2017 Yes    History of kidney cancer [Z85.528] 09/02/2016 Not Applicable    Essential hypertension [I10] 09/23/2015 Yes      Problems Resolved During this Admission:     57 M hx of ESRD presenting with chronic  L foot toe ulcer and found to have infected L AVG s/p resection  and ligation.  Arm is healing well, cultures indicating no infection of remaining AVG stumps.  Also s/p successful revascularization of LLE (AT and PT angioplasties) with resolvement of L foot rest pain.      -Will defer to ID for further abx course based on exposed graft cx.    -Obtain RUE U/S vein mapping for new AVF vs AVG creation (surgery to be done as outpatient)  -Agree continuing local wound care only for left toe ulcer  -Resume eliquis   -Start ASA and statin  -Consider Cardiology consult for heart failure if not recently evaluated based upon recent echo and very slow flows globally on angiogram   -add gabapentin for reperfusion/neuropathic pain  -Plan for f/u w vascular in clinic in 2 weeks for wound check and noninvasive studies      Obi Werner MD  Vascular Surgery  [facility] [place] - Med Surg

## 2023-07-28 NOTE — PLAN OF CARE
Problem: Adult Inpatient Plan of Care  Goal: Plan of Care Review  Outcome: Ongoing, Progressing  Flowsheets (Taken 7/28/2023 1742)  Plan of Care Reviewed With:   patient   spouse  Goal: Patient-Specific Goal (Individualized)  Outcome: Ongoing, Progressing  Goal: Absence of Hospital-Acquired Illness or Injury  Outcome: Ongoing, Progressing  Intervention: Identify and Manage Fall Risk  Flowsheets (Taken 7/28/2023 1742)  Safety Promotion/Fall Prevention:   assistive device/personal item within reach   high risk medications identified   Fall Risk reviewed with patient/family   side rails raised x 2   nonskid shoes/socks when out of bed   instructed to call staff for mobility   medications reviewed   bed alarm set  Intervention: Prevent Skin Injury  Flowsheets (Taken 7/28/2023 1742)  Body Position:   position changed independently   weight shifting  Skin Protection:   adhesive use limited   tubing/devices free from skin contact  Intervention: Prevent and Manage VTE (Venous Thromboembolism) Risk  Flowsheets (Taken 7/28/2023 1742)  Activity Management:   Arm raise - L1   Ankle pumps - L1   Rolling - L1   Straight leg raise - L1  VTE Prevention/Management:   ambulation promoted   bleeding precations maintained   bleeding risk assessed  Range of Motion: active ROM (range of motion) encouraged  Intervention: Prevent Infection  Flowsheets (Taken 7/28/2023 1742)  Infection Prevention: hand hygiene promoted  Goal: Optimal Comfort and Wellbeing  Outcome: Ongoing, Progressing  Goal: Readiness for Transition of Care  Outcome: Ongoing, Progressing   Pt alert able to make needs known,lolita meds well,IV abx remains in progress,no s/s adverse reaction noted,reposition self q 2hrs,pain controlled by prn pain medication,POC explained,remains free from falls and pressure injuries,safety maintained,continue monitoring.

## 2023-07-28 NOTE — PROGRESS NOTES
Vancomycin consult follow-up:    Patient reviewed, dialysis scheduled every Tues, Thurs, Sat, no new levels, no dose today; Next levels due: random due 7/29/2023 at 0400

## 2023-07-28 NOTE — SUBJECTIVE & OBJECTIVE
Interval History: No acute events overnight. Feeling well.     Review of Systems   Constitutional:  Negative for chills and fever.   Respiratory:  Negative for cough and shortness of breath.    Cardiovascular:  Negative for chest pain and leg swelling.   Gastrointestinal:  Negative for abdominal pain, diarrhea, nausea and vomiting.   Genitourinary:  Negative for dysuria.   Musculoskeletal:  Negative for arthralgias and back pain.   Skin:  Positive for wound.   Neurological:  Negative for headaches.   Psychiatric/Behavioral:  Negative for confusion.    Objective:     Vital Signs (Most Recent):  Temp: 98.2 °F (36.8 °C) (07/28/23 0702)  Pulse: 87 (07/28/23 0702)  Resp: 16 (07/28/23 0924)  BP: (!) 156/68 (07/28/23 0702)  SpO2: 98 % (07/28/23 0702) Vital Signs (24h Range):  Temp:  [97.5 °F (36.4 °C)-98.4 °F (36.9 °C)] 98.2 °F (36.8 °C)  Pulse:  [82-91] 87  Resp:  [16-18] 16  SpO2:  [94 %-100 %] 98 %  BP: (122-168)/(58-95) 156/68     Weight: 52.5 kg (115 lb 11.9 oz)  Body mass index is 19.87 kg/m².    Estimated Creatinine Clearance: 10.6 mL/min (A) (based on SCr of 4.8 mg/dL (H)).     Physical Exam  Vitals reviewed.   HENT:      Head: Normocephalic and atraumatic.   Eyes:      Extraocular Movements: Extraocular movements intact.      Pupils: Pupils are equal, round, and reactive to light.   Cardiovascular:      Rate and Rhythm: Normal rate and regular rhythm.   Abdominal:      General: Abdomen is flat.      Palpations: Abdomen is soft.   Musculoskeletal:      Comments: L 2nd toe- discolored  LUE- s/p graft resection- wound site without erythema or drainage  Lt sided HD tunneled cath   Skin:     General: Skin is warm and dry.   Neurological:      Mental Status: She is alert.   Psychiatric:         Mood and Affect: Mood normal.         Behavior: Behavior normal.        Significant Labs: Wound Culture:   Recent Labs   Lab 06/16/23  0930 07/20/23  0528 07/21/23  1300 07/21/23  1400   LABAERO MORAXELLA (BRANHAMELLA)  CATARRHALIS  Many  Beta Lactamase positive  * STAPHYLOCOCCUS EPIDERMIDIS  Many  * No growth  No growth  No growth  No growth ACHROMOBACTER XYLOSOXIDANS SUBSP. DENTRIFICANS  Many  *  STAPHYLOCOCCUS EPIDERMIDIS  Many  *     All pertinent labs within the past 24 hours have been reviewed.    Significant Imaging: I have reviewed all pertinent imaging results/findings within the past 24 hours.

## 2023-07-29 VITALS
BODY MASS INDEX: 19.76 KG/M2 | HEART RATE: 90 BPM | OXYGEN SATURATION: 96 % | RESPIRATION RATE: 18 BRPM | TEMPERATURE: 98 F | SYSTOLIC BLOOD PRESSURE: 129 MMHG | WEIGHT: 115.75 LBS | DIASTOLIC BLOOD PRESSURE: 72 MMHG | HEIGHT: 64 IN

## 2023-07-29 LAB
ALBUMIN SERPL BCP-MCNC: 2.1 G/DL (ref 3.5–5.2)
ALP SERPL-CCNC: 397 U/L (ref 55–135)
ALT SERPL W/O P-5'-P-CCNC: 11 U/L (ref 10–44)
ANION GAP SERPL CALC-SCNC: 15 MMOL/L (ref 8–16)
AST SERPL-CCNC: 46 U/L (ref 10–40)
BACTERIA ISLT: ABNORMAL
BILIRUB SERPL-MCNC: 0.5 MG/DL (ref 0.1–1)
BUN SERPL-MCNC: 62 MG/DL (ref 6–20)
CALCIUM SERPL-MCNC: 8.5 MG/DL (ref 8.7–10.5)
CHLORIDE SERPL-SCNC: 105 MMOL/L (ref 95–110)
CO2 SERPL-SCNC: 20 MMOL/L (ref 23–29)
CREAT SERPL-MCNC: 5 MG/DL (ref 0.5–1.4)
EST. GFR  (NO RACE VARIABLE): 9 ML/MIN/1.73 M^2
GLUCOSE SERPL-MCNC: 137 MG/DL (ref 70–110)
POTASSIUM SERPL-SCNC: 4.8 MMOL/L (ref 3.5–5.1)
PROT SERPL-MCNC: 5.6 G/DL (ref 6–8.4)
SODIUM SERPL-SCNC: 140 MMOL/L (ref 136–145)
VANCOMYCIN SERPL-MCNC: 18.7 UG/ML

## 2023-07-29 PROCEDURE — 36415 COLL VENOUS BLD VENIPUNCTURE: CPT | Performed by: INTERNAL MEDICINE

## 2023-07-29 PROCEDURE — 25000003 PHARM REV CODE 250: Performed by: PHYSICIAN ASSISTANT

## 2023-07-29 PROCEDURE — 36415 COLL VENOUS BLD VENIPUNCTURE: CPT | Performed by: STUDENT IN AN ORGANIZED HEALTH CARE EDUCATION/TRAINING PROGRAM

## 2023-07-29 PROCEDURE — 80100016 HC MAINTENANCE HEMODIALYSIS

## 2023-07-29 PROCEDURE — 63600175 PHARM REV CODE 636 W HCPCS: Performed by: INTERNAL MEDICINE

## 2023-07-29 PROCEDURE — 80202 ASSAY OF VANCOMYCIN: CPT | Performed by: STUDENT IN AN ORGANIZED HEALTH CARE EDUCATION/TRAINING PROGRAM

## 2023-07-29 PROCEDURE — 25000003 PHARM REV CODE 250: Performed by: STUDENT IN AN ORGANIZED HEALTH CARE EDUCATION/TRAINING PROGRAM

## 2023-07-29 PROCEDURE — 25000003 PHARM REV CODE 250: Performed by: SURGERY

## 2023-07-29 PROCEDURE — 80053 COMPREHEN METABOLIC PANEL: CPT | Performed by: INTERNAL MEDICINE

## 2023-07-29 PROCEDURE — 25000003 PHARM REV CODE 250: Performed by: INTERNAL MEDICINE

## 2023-07-29 PROCEDURE — 63700000 PHARM REV CODE 250 ALT 637 W/O HCPCS: Performed by: STUDENT IN AN ORGANIZED HEALTH CARE EDUCATION/TRAINING PROGRAM

## 2023-07-29 PROCEDURE — 63600175 PHARM REV CODE 636 W HCPCS: Performed by: STUDENT IN AN ORGANIZED HEALTH CARE EDUCATION/TRAINING PROGRAM

## 2023-07-29 RX ORDER — HYDROCODONE BITARTRATE AND ACETAMINOPHEN 5; 325 MG/1; MG/1
1 TABLET ORAL EVERY 6 HOURS PRN
Qty: 10 TABLET | Refills: 0 | Status: SHIPPED | OUTPATIENT
Start: 2023-07-29 | End: 2023-11-08

## 2023-07-29 RX ORDER — FLUCONAZOLE 200 MG/1
400 TABLET ORAL
Qty: 4 TABLET | Refills: 0 | Status: SHIPPED | OUTPATIENT
Start: 2023-08-01 | End: 2023-08-09

## 2023-07-29 RX ORDER — NAPROXEN SODIUM 220 MG/1
81 TABLET, FILM COATED ORAL DAILY
Qty: 30 TABLET | Refills: 11 | Status: ON HOLD | OUTPATIENT
Start: 2023-07-30 | End: 2023-11-02 | Stop reason: HOSPADM

## 2023-07-29 RX ORDER — SULFAMETHOXAZOLE AND TRIMETHOPRIM 400; 80 MG/1; MG/1
1 TABLET ORAL DAILY
Qty: 6 TABLET | Refills: 0 | Status: SHIPPED | OUTPATIENT
Start: 2023-07-29 | End: 2023-08-04

## 2023-07-29 RX ADMIN — ASPIRIN 81 MG CHEWABLE TABLET 81 MG: 81 TABLET CHEWABLE at 08:07

## 2023-07-29 RX ADMIN — THERA TABS 1 TABLET: TAB at 09:07

## 2023-07-29 RX ADMIN — MEROPENEM AND SODIUM CHLORIDE 500 MG: 500 INJECTION, SOLUTION INTRAVENOUS at 04:07

## 2023-07-29 RX ADMIN — METOPROLOL SUCCINATE 50 MG: 50 TABLET, EXTENDED RELEASE ORAL at 08:07

## 2023-07-29 RX ADMIN — FLUCONAZOLE 400 MG: 100 TABLET ORAL at 03:07

## 2023-07-29 RX ADMIN — OXYCODONE AND ACETAMINOPHEN 1 TABLET: 7.5; 325 TABLET ORAL at 12:07

## 2023-07-29 RX ADMIN — CLONIDINE 1 PATCH: 0.3 PATCH TRANSDERMAL at 08:07

## 2023-07-29 RX ADMIN — SACUBITRIL AND VALSARTAN 1 TABLET: 24; 26 TABLET, FILM COATED ORAL at 08:07

## 2023-07-29 RX ADMIN — APIXABAN 2.5 MG: 2.5 TABLET, FILM COATED ORAL at 08:07

## 2023-07-29 RX ADMIN — HYDRALAZINE HYDROCHLORIDE 100 MG: 25 TABLET, FILM COATED ORAL at 08:07

## 2023-07-29 RX ADMIN — EPOETIN ALFA-EPBX 6000 UNITS: 10000 INJECTION, SOLUTION INTRAVENOUS; SUBCUTANEOUS at 08:07

## 2023-07-29 NOTE — NURSING
Patient discharged per MD order.  IV  will be removed after IV ABX complete.  No distress noted.  Discharge instructions explained.  AVS given to patient   Patient verbalized understanding.  VSS.  Afebrile.  No complaints of pain, N/V, diarrhea or SOB.  Pt informed where to  Rx .

## 2023-07-29 NOTE — PLAN OF CARE
Referral sent via careport to resume [facility].       07/29/23 1588   Post-Acute Status   Post-Acute Authorization Home Health   Home Health Status Referrals Sent   Hospital Resources/Appts/Education Provided Appointments scheduled and added to AVS   Discharge Delays None known at this time     Nurse notified ok to Dc patient from a CM standpoint

## 2023-07-29 NOTE — PLAN OF CARE
[facility] - [facility]      HOME HEALTH ORDERS  FACE TO FACE ENCOUNTER    Patient Name: Eva Bloom  YOB: 1965    PCP: Sowmya Mccain MD   PCP Address: [address] / [place] [address]  PCP Phone Number: [phone]  PCP Fax: [phone]    Encounter Date: 7/19/23    Admit to Home Health    Diagnoses:  Active Hospital Problems    Diagnosis  POA    *Ischemic ulcer of toe of left foot, with unspecified severity [L97.529]  Yes    Hemorrhoid [K64.9]  Yes    Critical limb ischemia of left lower extremity with gangrene [I70.262]  Yes    Hypothyroidism [E03.9]  Yes    Aortic atherosclerosis [I70.0]  Yes    Arteriovenous graft infection [T82.7XXA]  Yes    PAF (paroxysmal atrial fibrillation) [I48.0]  Yes    ICD (implantable cardioverter-defibrillator) in place - SubQ [Z95.810]  Yes    Anemia in ESRD (end-stage renal disease) [N18.6, D63.1]  Yes    Chronic combined systolic and diastolic congestive heart failure [I50.42]  Yes    ESRD (end stage renal disease) on dialysis [N18.6, Z99.2]  Not Applicable     - Tuesday, Thursday, and Saturday  - Chelsie Hart, nephrology      Pulmonary hypertension [I27.20]  Yes    History of kidney cancer [Z85.528]  Not Applicable    Essential hypertension [I10]  Yes      Resolved Hospital Problems   No resolved problems to display.       Follow Up Appointments:  Future Appointments   Date Time Provider Department Center   8/4/2023  7:30 AM TONA Hidalgo [facility] MED [place]   8/18/2023  8:30 AM VASCULAR, LAB [facility] VASCLAB [place] [facility]   8/18/2023  9:00 AM FARIDEH Muñoz III, MD [facility] JAYNACSCURTIS [place]   9/13/2023  7:30 AM LAB, HEMONC CANCER DG [facility] LAB HO [place]   9/13/2023  8:30 AM Liset Ngo NP [facility] HEMONC3 [place]       Allergies:  Review of patient's allergies indicates:   Allergen Reactions    Coreg [carvedilol] Other (See Comments)     Nausea/vomiting    Allopurinol      Other reaction(s): abnormal transaminases       Medications:  Review discharge medications with patient and family and provide education.      I have seen and examined this patient within the last 30 days. My clinical findings that support the need for the home health skilled services and home bound status are the following:no   Weakness/numbness causing balance and gait disturbance due to Infection and Weakness/Debility making it taxing to leave home.     Diet:   renal diet    Labs:  Per PCP    Referrals/ Consults  Physical Therapy to evaluate and treat. Evaluate for home safety and equipment needs; Establish/upgrade home exercise program. Perform / instruct on therapeutic exercises, gait training, transfer training, and Range of Motion.  Occupational Therapy to evaluate and treat. Evaluate home environment for safety and equipment needs. Perform/Instruct on transfers, ADL training, ROM, and therapeutic exercises.  Aide to provide assistance with personal care, ADLs, and vital signs.    Activities:   activity as tolerated    Nursing:   Agency to admit patient within 24 hours of hospital discharge unless specified on physician order or at patient request    SN to complete comprehensive assessment including routine vital signs. Instruct on disease process and s/s of complications to report to MD. Review/verify medication list sent home with the patient at time of discharge  and instruct patient/caregiver as needed. Frequency may be adjusted depending on start of care date.     Skilled nurse to perform up to 3 visits PRN for symptoms related to diagnosis    Notify MD if SBP > 160 or < 90; DBP > 90 or < 50; HR > 120 or < 50; Temp > 101; O2 < 88%; Other:       Ok to schedule additional visits based on staff availability and patient request on consecutive days within the home health episode.    Miscellaneous   Wound care to left toe - paint betadine daily, leave open. Wear darco shoes.    Home Health Aide:  Nursing , Physical Therapy , Occupational Therapy , Medical Social Work ,  and Home Health Aide     Wound Care Orders      I certify that this patient is confined to her home and needs intermittent skilled nursing care, physical therapy, and occupational therapy.

## 2023-07-29 NOTE — PROGRESS NOTES
Renal Progress Note    Date of Admission:  7/19/2023  3:06 PM    Length of Stay: 9  Days    Subjective: no complaints    Objective:    Current Facility-Administered Medications   Medication    0.9%  NaCl infusion (for blood administration)    0.9%  NaCl infusion    acetaminophen tablet 650 mg    apixaban tablet 2.5 mg    aspirin chewable tablet 81 mg    cabozantinib Tab 60 mg    cetirizine tablet 10 mg    cloNIDine 0.3 mg/24 hr td ptwk 1 patch    dextrose 50% injection 12.5 g    dextrose 50% injection 25 g    epoetin david-epbx injection 6,000 Units    glucagon (human recombinant) injection 1 mg    glucose chewable tablet 16 g    glucose chewable tablet 24 g    hydrALAZINE tablet 100 mg    hydrocortisone 2.5 % rectal cream    HYDROmorphone injection 1 mg    levothyroxine tablet 75 mcg    loperamide capsule 2 mg    magnesium oxide tablet 800 mg    magnesium oxide tablet 800 mg    melatonin tablet 6 mg    meropenem-0.9% sodium chloride 500 mg/50 mL IVPB    metoprolol succinate (TOPROL-XL) 24 hr tablet 50 mg    micafungin 100 mg in sodium chloride 0.9 % 100 mL IVPB (MB+)    multivitamin tablet    naloxone 0.4 mg/mL injection 0.02 mg    ondansetron injection 8 mg    oxyCODONE-acetaminophen 7.5-325 mg per tablet 1 tablet    sacubitriL-valsartan 24-26 mg per tablet 1 tablet    senna-docusate 8.6-50 mg per tablet 1 tablet    sodium chloride 0.9% bolus 250 mL 250 mL    sodium chloride 0.9% flush 10 mL    vancomycin (VANCOCIN) 500 mg in dextrose 5 % in water (D5W) 100 mL IVPB (MB+)    vancomycin - pharmacy to dose     Facility-Administered Medications Ordered in Other Encounters   Medication    0.9%  NaCl infusion       Vitals:    07/29/23 0004 07/29/23 0015 07/29/23 0457 07/29/23 0657   BP: (!) 161/93  (!) 171/99 (!) 148/65   BP Location: Right arm      Patient Position: Lying  Lying Lying   Pulse: 94  95 91   Resp: 17 18 17 19   Temp: 97.7 °F (36.5 °C)  98 °F (36.7 °C) 97.8 °F (36.6 °C)   TempSrc:  "Oral  Oral Oral   SpO2: 97%  96% (!) 94%   Weight:       Height:           I/O last 3 completed shifts:  In: 1320 [P.O.:1320]  Out: 0   No intake/output data recorded.      Physical Exam: seen during Dialysis otherwise unchanged      Laboratories:    No results for input(s): "WBC", "RBC", "HGB", "HCT", "PLT", "MCV", "MCH", "MCHC" in the last 24 hours.      No results for input(s): "GLUCOSE", "CALCIUM", "ALBUMIN", "PROT", "NA", "K", "CO2", "CL", "BUN", "CREATININE", "ALKPHOS", "ALT", "AST", "BILITOT" in the last 24 hours.      No results for input(s): "COLORU", "CLARITYU", "SPECGRAV", "PHUR", "PROTEINUA", "GLUCOSEU", "BILIRUBINCON", "BLOODU", "WBCU", "RBCU", "BACTERIA", "MUCUS" in the last 24 hours.    Microbiology Results (last 7 days)       Procedure Component Value Units Date/Time    Culture, ID Fungal (Consult) [[id number]] Collected: 07/21/23 1400    Order Status: No result Specimen: Incision site from Arm, Left Updated: 07/28/23 1549    Fungus culture [[id number]]  (Abnormal) Collected: 07/21/23 1400    Order Status: Completed Specimen: Incision site from Arm, Left Updated: 07/28/23 1433     Fungus (Mycology) Culture YEAST   Many  Identification pending  Isolate sent out for reference testing      Narrative:      exposed left arm AV graft    Aerobic culture [[id number]]  (Abnormal)  (Susceptibility) Collected: 07/21/23 1400    Order Status: Completed Specimen: Incision site from Arm, Left Updated: 07/26/23 0804     Aerobic Bacterial Culture ACHROMOBACTER XYLOSOXIDANS SUBSP. DENTRIFICANS  Many        STAPHYLOCOCCUS EPIDERMIDIS  Many      Narrative:      exposed left arm AV graft    Culture, Anaerobe [[id number]] Collected: 07/21/23 1400    Order Status: Completed Specimen: Incision site from Arm, Left Updated: 07/25/23 1023     Anaerobic Culture No anaerobes isolated    Narrative:      exposed left arm AV graft    Culture, Anaerobe [[id number]] Collected: 07/21/23 1300    Order Status: Completed Specimen: Incision " site from Arm, Left Updated: 07/25/23 1022     Anaerobic Culture No anaerobes isolated    Narrative:      left arm AV graft (venous end)    Culture, Anaerobe [[id number]] Collected: 07/21/23 1300    Order Status: Completed Specimen: Incision site from Arm, Left Updated: 07/25/23 1021     Anaerobic Culture No anaerobes isolated    Narrative:      left arm AV graft (arterial end)    Culture, Anaerobe [[id number]] Collected: 07/21/23 1300    Order Status: Completed Specimen: Incision site from Arm, Left Updated: 07/25/23 1020     Anaerobic Culture No anaerobes isolated    Narrative:      distal sapphire-graft fluid venous end  (x2 swabs @ same site)    Culture, Anaerobe [[id number]] Collected: 07/21/23 1300    Order Status: Completed Specimen: Incision site from Arm, Left Updated: 07/25/23 1020     Anaerobic Culture No anaerobes isolated    Narrative:      proximal sapphire-graft fluid arterial end  (x2 swabs @ same site)    Aerobic culture [[id number]] Collected: 07/21/23 1300    Order Status: Completed Specimen: Incision site from Arm, Left Updated: 07/25/23 0802     Aerobic Bacterial Culture No growth    Narrative:      left arm AV graft (venous end)    Aerobic culture [[id number]] Collected: 07/21/23 1300    Order Status: Completed Specimen: Incision site from Arm, Left Updated: 07/25/23 0759     Aerobic Bacterial Culture No growth    Narrative:      left arm AV graft (arterial end)    Aerobic culture [[id number]] Collected: 07/21/23 1300    Order Status: Completed Specimen: Incision site from Arm, Left Updated: 07/25/23 0758     Aerobic Bacterial Culture No growth    Narrative:      distal sapphire-graft fluid venous end  (x2 swabs @ same site)    Aerobic culture [[id number]] Collected: 07/21/23 1300    Order Status: Completed Specimen: Incision site from Arm, Left Updated: 07/25/23 0758     Aerobic Bacterial Culture No growth    Narrative:      proximal sapphire-graft fluid arterial end  (x2 swabs @ same site)    AFB Culture & Smear  [[id number]] Collected: 07/21/23 1400    Order Status: Completed Specimen: Incision site from Arm, Left Updated: 07/24/23 1703     AFB Culture & Smear Culture in progress     AFB CULTURE STAIN No acid fast bacilli seen.    Narrative:      exposed left arm AV graft    AFB Culture & Smear [[id number]] Collected: 07/21/23 1300    Order Status: Completed Specimen: Incision site from Arm, Left Updated: 07/24/23 1703     AFB Culture & Smear Culture in progress     AFB CULTURE STAIN No acid fast bacilli seen.    Narrative:      proximal sapphire-graft fluid arterial end  (x2 swabs @ same site)    AFB Culture & Smear [[id number]] Collected: 07/21/23 1300    Order Status: Completed Specimen: Incision site from Arm, Left Updated: 07/24/23 1703     AFB Culture & Smear Culture in progress     AFB CULTURE STAIN No acid fast bacilli seen.    Narrative:      left arm AV graft (venous end)    AFB Culture & Smear [[id number]] Collected: 07/21/23 1300    Order Status: Completed Specimen: Incision site from Arm, Left Updated: 07/24/23 1703     AFB Culture & Smear Culture in progress     AFB CULTURE STAIN No acid fast bacilli seen.    Narrative:      distal sapphire-graft fluid venous end  (x2 swabs @ same site)    AFB Culture & Smear [[id number]] Collected: 07/21/23 1300    Order Status: Completed Specimen: Incision site from Arm, Left Updated: 07/24/23 1703     AFB Culture & Smear Culture in progress     AFB CULTURE STAIN No acid fast bacilli seen.    Narrative:      left arm AV graft (arterial end)    Blood culture x two cultures. Draw prior to antibiotics. [[id number]] Collected: 07/19/23 1602    Order Status: Completed Specimen: Blood from Peripheral, Forearm, Right Updated: 07/23/23 1703     Blood Culture, Routine No Growth after 4 days.    Narrative:      Aerobic and anaerobic    Blood culture x two cultures. Draw prior to antibiotics. [[id number]] Collected: 07/19/23 1547    Order Status: Completed Specimen: Blood from Peripheral, Forearm,  Right Updated: 07/23/23 1703     Blood Culture, Routine No Growth after 4 days.    Narrative:      Aerobic and anaerobic    Aerobic culture (Specify Source) **CANNOT BE ORDERED AS STAT** [[id number]]  (Abnormal)  (Susceptibility) Collected: 07/20/23 0528    Order Status: Completed Specimen: Wound from Toe, Left Foot Updated: 07/22/23 1301     Aerobic Bacterial Culture STAPHYLOCOCCUS EPIDERMIDIS  Many      Narrative:      Left middle toe    Gram stain [[id number]] Collected: 07/21/23 1400    Order Status: Completed Specimen: Incision site from Arm, Left Updated: 07/22/23 1117     Gram Stain Result Few WBC's      Moderate Gram positive cocci      Rare Gram negative rods    Narrative:      exposed left arm AV graft    Gram stain [[id number]] Collected: 07/21/23 1300    Order Status: Completed Specimen: Incision site from Arm, Left Updated: 07/22/23 1114     Gram Stain Result Few WBC's      No organisms seen    Narrative:      left arm AV graft (venous end)    Gram stain [[id number]] Collected: 07/21/23 1300    Order Status: Completed Specimen: Incision site from Arm, Left Updated: 07/22/23 1112     Gram Stain Result Few WBC's      No organisms seen    Narrative:      left arm AV graft (arterial end)    Gram stain [[id number]] Collected: 07/21/23 1300    Order Status: Completed Specimen: Incision site from Arm, Left Updated: 07/22/23 1058     Gram Stain Result Rare WBC's      No organisms seen    Narrative:      distal sapphire-graft fluid venous end  (x2 swabs @ same site)    Gram stain [[id number]] Collected: 07/21/23 1300    Order Status: Completed Specimen: Incision site from Arm, Left Updated: 07/22/23 1057     Gram Stain Result Rare WBC's      No organisms seen    Narrative:      proximal sapphire-graft fluid arterial end  (x2 swabs @ same site)              Diagnostic Tests: n/a        Assessment:    56 y/o female with ESRD on HD admitted with:    - L-Toe ulcer  - PAD LLE with gangrene s/p revascularization  - Chronic combined  HF  - PAF  - Hx. AICD  - Hx. Pulmonary HTN  - Anemia of ckd  - HTN  - AV access malfunction-infection s/p excision (7/21) and THDC placement              Plan:      - Antibiotics per ID  - Dialysis Q TTS  - Epogen 3 x week  - Renal diet + protein supplements  - Wound care per Podiatry  - Discharge planning and other problems per admitting      Will follow on Dialysis days.

## 2023-07-29 NOTE — PLAN OF CARE
Problem: Adult Inpatient Plan of Care  Goal: Plan of Care Review  Outcome: Ongoing, Progressing     Problem: Adult Inpatient Plan of Care  Goal: Patient-Specific Goal (Individualized)  Outcome: Ongoing, Progressing     Problem: Adult Inpatient Plan of Care  Goal: Absence of Hospital-Acquired Illness or Injury  Outcome: Ongoing, Progressing     Problem: Adult Inpatient Plan of Care  Goal: Optimal Comfort and Wellbeing  Outcome: Ongoing, Progressing     Problem: Fall Injury Risk  Goal: Absence of Fall and Fall-Related Injury  Outcome: Ongoing, Progressing     Problem: Hemodynamic Instability (Hemodialysis)  Goal: Effective Tissue Perfusion  Outcome: Ongoing, Progressing     Problem: Anemia  Goal: Anemia Symptom Improvement  Outcome: Ongoing, Progressing     Problem: Pain Acute  Goal: Acceptable Pain Control and Functional Ability  Outcome: Ongoing, Progressing     Problem: Oral Intake Inadequate  Goal: Improved Oral Intake  Outcome: Ongoing, Progressing     Problem: Fatigue  Goal: Improved Activity Tolerance  Outcome: Ongoing, Progressing

## 2023-07-29 NOTE — NURSING
[facility] [place]  Nurses Note -- 4 Eyes      7/29/2023       Skin assessed on: Q Shift      [x] No Pressure Injuries Present    [x]Prevention Measures Documented    [] Yes LDA  for Pressure Injury Previously documented     [] Yes New Pressure Injury Discovered   [] LDA for New Pressure Injury Added      Attending RN:  Adriana Estevez RN     Second RN:  AUSTIN Lira

## 2023-07-29 NOTE — PLAN OF CARE
Chart reviewed.    AVG infection  s/p  L AVG excision and ligation. Graft cultures grew achromobacter, s epi and yeast. Silvia graft swab cx from venous and arterial ends remain ngtd. No evidence of systemic infection, so will plan to treat for SSTI.     Recommendations:  -Yeast identified as candida parapsilosis.  OK to switch to fluconazole 400mg po to be administered 3 times weekly (on HD days) after HD to complete a 14 day course. Last day 8/9/23.    -currently on anny and vanc; transition to bactrim (renally dosed) 1DS daily (should be taken after HD on HD days) x 2 wks from graft removal. Last day 8/4/23.    ID will sign off.  Oumou Tejada MD  Infectious Disease

## 2023-07-29 NOTE — PROGRESS NOTES
"Pharmacokinetic Assessment Follow Up: IV Vancomycin    Vancomycin serum concentration assessment(s):    The random level was drawn correctly and can be used to guide therapy at this time. The measurement is within the desired definitive target range of 10 to 20 mcg/mL.    Vancomycin Regimen Plan:    Vancomycin 500mg scheduled post HD    Re-dose when the random level is less than 10 mcg/mL, next level to be drawn at 04:00 on 08/01/23    Drug levels (last 3 results):  Recent Labs   Lab Result Units 07/27/23  0409 07/29/23  0513   Vancomycin, Random ug/mL 16.0 18.7       Pharmacy will continue to follow and monitor vancomycin.    Please contact pharmacy at extension [phone] for questions regarding this assessment.    Thank you for the consult,   Leonard Perez       Patient brief summary:  Eva Bloom is a 57 y.o. female initiated on antimicrobial therapy with IV Vancomycin for treatment of skin & soft tissue infection    The patient's current regimen is Vancomycin 500mg on Tuesday, Thursday, and Saturday post dialysis.    Drug Allergies:   Review of patient's allergies indicates:   Allergen Reactions    Coreg [carvedilol] Other (See Comments)     Nausea/vomiting    Allopurinol      Other reaction(s): abnormal transaminases       Actual Body Weight:   52.5 kg    Renal Function:   Estimated Creatinine Clearance: 10.6 mL/min (A) (based on SCr of 4.8 mg/dL (H)).,     Dialysis Method (if applicable):  intermittent HD    CBC (last 72 hours):  No results for input(s): "WHITE BLOOD CELL COUNT", "HEMOGLOBIN", "HEMATOCRIT", "PLATELETS", "GRAN%", "LYMPH%", "MONO%", "EOSINOPHIL%", "BASOPHIL%", "DIFFERENTIAL METHOD" in the last 72 hours.    Metabolic Panel (last 72 hours):  No results for input(s): "SODIUM", "POTASSIUM", "CHLORIDE", "CO2", "GLUCOSE", "BUN BLD", "CREATININE", "ALBUMIN", "BILIRUBIN TOTAL", "ALK PHOS", "AST", "ALT", "MAGNESIUM", "PHOSPHORUS" in the last 72 hours.    Vancomycin Administrations:  vancomycin given in " the last 96 hours                     vancomycin (VANCOCIN) 500 mg in dextrose 5 % in water (D5W) 100 mL IVPB (MB+) (mg) 500 mg New Bag 07/27/23 1722    vancomycin (VANCOCIN) 500 mg in dextrose 5 % in water (D5W) 5 % 100 mL IVPB (MB+) (mg) 500 mg New Bag 07/25/23 2039                    Microbiologic Results:  Microbiology Results (last 7 days)       Procedure Component Value Units Date/Time    Culture, ID Fungal (Consult) [[id number]] Collected: 07/21/23 1400    Order Status: No result Specimen: Incision site from Arm, Left Updated: 07/28/23 1549    Fungus culture [[id number]]  (Abnormal) Collected: 07/21/23 1400    Order Status: Completed Specimen: Incision site from Arm, Left Updated: 07/28/23 1433     Fungus (Mycology) Culture YEAST   Many  Identification pending  Isolate sent out for reference testing      Narrative:      exposed left arm AV graft    Aerobic culture [[id number]]  (Abnormal)  (Susceptibility) Collected: 07/21/23 1400    Order Status: Completed Specimen: Incision site from Arm, Left Updated: 07/26/23 0804     Aerobic Bacterial Culture ACHROMOBACTER XYLOSOXIDANS SUBSP. DENTRIFICANS  Many        STAPHYLOCOCCUS EPIDERMIDIS  Many      Narrative:      exposed left arm AV graft    Culture, Anaerobe [[id number]] Collected: 07/21/23 1400    Order Status: Completed Specimen: Incision site from Arm, Left Updated: 07/25/23 1023     Anaerobic Culture No anaerobes isolated    Narrative:      exposed left arm AV graft    Culture, Anaerobe [[id number]] Collected: 07/21/23 1300    Order Status: Completed Specimen: Incision site from Arm, Left Updated: 07/25/23 1022     Anaerobic Culture No anaerobes isolated    Narrative:      left arm AV graft (venous end)    Culture, Anaerobe [[id number]] Collected: 07/21/23 1300    Order Status: Completed Specimen: Incision site from Arm, Left Updated: 07/25/23 1021     Anaerobic Culture No anaerobes isolated    Narrative:      left arm AV graft (arterial end)    Culture, Anaerobe  [[id number]] Collected: 07/21/23 1300    Order Status: Completed Specimen: Incision site from Arm, Left Updated: 07/25/23 1020     Anaerobic Culture No anaerobes isolated    Narrative:      distal sapphire-graft fluid venous end  (x2 swabs @ same site)    Culture, Anaerobe [[id number]] Collected: 07/21/23 1300    Order Status: Completed Specimen: Incision site from Arm, Left Updated: 07/25/23 1020     Anaerobic Culture No anaerobes isolated    Narrative:      proximal sapphire-graft fluid arterial end  (x2 swabs @ same site)    Aerobic culture [[id number]] Collected: 07/21/23 1300    Order Status: Completed Specimen: Incision site from Arm, Left Updated: 07/25/23 0802     Aerobic Bacterial Culture No growth    Narrative:      left arm AV graft (venous end)    Aerobic culture [[id number]] Collected: 07/21/23 1300    Order Status: Completed Specimen: Incision site from Arm, Left Updated: 07/25/23 0759     Aerobic Bacterial Culture No growth    Narrative:      left arm AV graft (arterial end)    Aerobic culture [[id number]] Collected: 07/21/23 1300    Order Status: Completed Specimen: Incision site from Arm, Left Updated: 07/25/23 0758     Aerobic Bacterial Culture No growth    Narrative:      distal sapphire-graft fluid venous end  (x2 swabs @ same site)    Aerobic culture [[id number]] Collected: 07/21/23 1300    Order Status: Completed Specimen: Incision site from Arm, Left Updated: 07/25/23 0758     Aerobic Bacterial Culture No growth    Narrative:      proximal sapphire-graft fluid arterial end  (x2 swabs @ same site)    AFB Culture & Smear [[id number]] Collected: 07/21/23 1400    Order Status: Completed Specimen: Incision site from Arm, Left Updated: 07/24/23 1703     AFB Culture & Smear Culture in progress     AFB CULTURE STAIN No acid fast bacilli seen.    Narrative:      exposed left arm AV graft    AFB Culture & Smear [[id number]] Collected: 07/21/23 1300    Order Status: Completed Specimen: Incision site from Arm, Left Updated:  07/24/23 1703     AFB Culture & Smear Culture in progress     AFB CULTURE STAIN No acid fast bacilli seen.    Narrative:      proximal sapphire-graft fluid arterial end  (x2 swabs @ same site)    AFB Culture & Smear [[id number]] Collected: 07/21/23 1300    Order Status: Completed Specimen: Incision site from Arm, Left Updated: 07/24/23 1703     AFB Culture & Smear Culture in progress     AFB CULTURE STAIN No acid fast bacilli seen.    Narrative:      left arm AV graft (venous end)    AFB Culture & Smear [[id number]] Collected: 07/21/23 1300    Order Status: Completed Specimen: Incision site from Arm, Left Updated: 07/24/23 1703     AFB Culture & Smear Culture in progress     AFB CULTURE STAIN No acid fast bacilli seen.    Narrative:      distal sapphire-graft fluid venous end  (x2 swabs @ same site)    AFB Culture & Smear [[id number]] Collected: 07/21/23 1300    Order Status: Completed Specimen: Incision site from Arm, Left Updated: 07/24/23 1703     AFB Culture & Smear Culture in progress     AFB CULTURE STAIN No acid fast bacilli seen.    Narrative:      left arm AV graft (arterial end)    Blood culture x two cultures. Draw prior to antibiotics. [[id number]] Collected: 07/19/23 1602    Order Status: Completed Specimen: Blood from Peripheral, Forearm, Right Updated: 07/23/23 1703     Blood Culture, Routine No Growth after 4 days.    Narrative:      Aerobic and anaerobic    Blood culture x two cultures. Draw prior to antibiotics. [[id number]] Collected: 07/19/23 1547    Order Status: Completed Specimen: Blood from Peripheral, Forearm, Right Updated: 07/23/23 1703     Blood Culture, Routine No Growth after 4 days.    Narrative:      Aerobic and anaerobic    Aerobic culture (Specify Source) **CANNOT BE ORDERED AS STAT** [[id number]]  (Abnormal)  (Susceptibility) Collected: 07/20/23 0528    Order Status: Completed Specimen: Wound from Toe, Left Foot Updated: 07/22/23 1301     Aerobic Bacterial Culture STAPHYLOCOCCUS  EPIDERMIDIS  Many      Narrative:      Left middle toe    Gram stain [[id number]] Collected: 07/21/23 1400    Order Status: Completed Specimen: Incision site from Arm, Left Updated: 07/22/23 1117     Gram Stain Result Few WBC's      Moderate Gram positive cocci      Rare Gram negative rods    Narrative:      exposed left arm AV graft    Gram stain [[id number]] Collected: 07/21/23 1300    Order Status: Completed Specimen: Incision site from Arm, Left Updated: 07/22/23 1114     Gram Stain Result Few WBC's      No organisms seen    Narrative:      left arm AV graft (venous end)    Gram stain [[id number]] Collected: 07/21/23 1300    Order Status: Completed Specimen: Incision site from Arm, Left Updated: 07/22/23 1112     Gram Stain Result Few WBC's      No organisms seen    Narrative:      left arm AV graft (arterial end)    Gram stain [[id number]] Collected: 07/21/23 1300    Order Status: Completed Specimen: Incision site from Arm, Left Updated: 07/22/23 1058     Gram Stain Result Rare WBC's      No organisms seen    Narrative:      distal sapphire-graft fluid venous end  (x2 swabs @ same site)    Gram stain [[id number]] Collected: 07/21/23 1300    Order Status: Completed Specimen: Incision site from Arm, Left Updated: 07/22/23 1057     Gram Stain Result Rare WBC's      No organisms seen    Narrative:      proximal sapphire-graft fluid arterial end  (x2 swabs @ same site)

## 2023-07-30 NOTE — DISCHARGE SUMMARY
[facility] Medicine  Discharge Summary      Patient Name: Eva Bloom  MRN: [id number]  [facility]: [id number]  Patient Class: IP- Inpatient  Admission Date: 7/19/2023  Hospital Length of Stay: 9 days  Discharge Date and Time: 7/29/2023  6:03 PM  Attending Physician: No att. providers found   Discharging Provider: Leonardo Robledo MD  Primary Care Provider: Sowmya Mccain MD    Primary Care Team: Networked reference to record PCT     HPI:   Eva Bloom 57 y.o. female with afib on eliquis, HTN, CHF, ESRD on HD T/TH/S, RCC presents to the hospital chief complaint of toe wound.  She reports 1 month an ulcer to left toe that occurred after a chair rolled over foot.  He is had increasing pain to the toe described as a throbbing.  She attempted treatment at home with ice without improvement.  She is dialyzed Tuesday Thursday Saturday at [facility].  She no longer makes urine.  She denies fever chest pain shortness breast nausea vomiting abdominal pain melena hematuria hematemesis dizziness syncope.    In the ED, afebrile without leukocytosis x-ray foot without fracture or dislocation potassium 4.3 lactic acid negative heme-positive stool.      Procedure(s) (LRB):  LEFT ARM ARTERIOVENOUS GRAFT EXCISION  AND LIGATION (Left)      Hospital Course:   Eva Bloom 57 y.o. female with afib on eliquis, HTN, CHF, ESRD on HD T/TH/S, RCC presents to the hospital chief complaint of toe wound.  She reports 1 month an ulcer to left toe that occurred after a chair rolled over foot.  He is had increasing pain to the toe described as a throbbing.  She attempted treatment at home with ice without improvement.  She is dialyzed Tuesday Thursday Saturday at [facility] in [place].  She no longer makes urine.  She denies fever chest pain shortness breast nausea vomiting abdominal pain melena hematuria hematemesis dizziness syncope.    In the ED, patient found to be afebrile without leukocytosis.  X-ray of the  foot did not show any fracture or dislocation.  There was also concern for left AV graft infection.  Podiatry was consulted and there was concern for peripheral artery disease.  Ultrasounds arterial bilaterally showed bilateral 50% stenosis.  Vascular surgery consulted.  Patient was found to have a left AV graft infection and underwent excision and ligation on 7/21/23.  She had a new THDC placed by IR for new access while AVG heals. Cultures from AVG growing  Achromobacter yxlosoxidans, staph epidermidis and yeast. ID consulted for antibiotic recommendations. Underwent angiogram and succesfull revascularization of LLE by Vascular Surgery on 7/25. Okay to restart eliquis.  Podiatry discussed with patient about toe wound and plan is for wound care, antibiotics and follow-up as outpatient.    Overall, patient doing much better and stable for discharge.  She will take renally dosed Bactrim SS daily until 8/4 and fluconazole 3 times a week after dialysis on dialysis days until 8/9.  Prescription sent to pharmacy and reviewed with patient and her .  Wound care discussed with patient and her  as well and referral placed for outpatient.  Home health continued.  Patient discharged home in stable condition.             Goals of Care Treatment Preferences:  Code Status: Full Code          What is most important right now is to focus on symptom/pain control, extending life as long as possible, even it it means sacrificing quality, curative/life-prolongation (regardless of treatment burdens).  Accordingly, we have decided that the best plan to meet the patient's goals includes continuing with treatment.      Consults:   Consults (From admission, onward)        Status Ordering Provider     Inpatient consult to Infectious Diseases  Once        Provider:  Renetta Villa MD    Completed AVERY HAMILTON     Inpatient consult to Interventional Radiology  Once        Provider:  Pan Win MD    Completed MAGY  APRIL     Case Management/  Once        Provider:  (Not yet assigned)    Completed SADIE BUSTOS     Inpatient consult to Vascular Surgery  Once        Provider:  Obi Werner MD    Completed SADIE BUSTOS     Inpatient consult to Gastroenterology  Once        Provider:  Beatriz Leo MD    Completed SADIE BUSTOS     Inpatient consult to Podiatry  Once        Provider:  Jael Rosario, DPM    Completed DARRICK LOPEZ  Arteriovenous graft infection  Underwent excision/ligation of left AVG on 7/21  - cultures growing Achromobacter yxlosoxidans, staph epidermidis and yeast  - Change to meropenem and vancomycin  - ID consulted for recommendations  - pending yeast ID for final recommendations        Final Active Diagnoses:    Diagnosis Date Noted POA    PRINCIPAL PROBLEM:  Ischemic ulcer of toe of left foot, with unspecified severity [L97.529] 07/19/2023 Yes    Hemorrhoid [K64.9] 07/24/2023 Yes    Critical limb ischemia of left lower extremity with gangrene [I70.262] 07/20/2023 Yes    Hypothyroidism [E03.9] 07/19/2023 Yes    Aortic atherosclerosis [I70.0] 07/06/2023 Yes    Arteriovenous graft infection [T82.7XXA] 01/24/2023 Yes    PAF (paroxysmal atrial fibrillation) [I48.0] 07/25/2022 Yes    ICD (implantable cardioverter-defibrillator) in place - SubQ [Z95.810] 07/15/2021 Yes    Anemia in ESRD (end-stage renal disease) [N18.6, D63.1]  Yes    Chronic combined systolic and diastolic congestive heart failure [I50.42]  Yes    ESRD (end stage renal disease) on dialysis [N18.6, Z99.2]  Not Applicable    Pulmonary hypertension [I27.20] 07/21/2017 Yes    History of kidney cancer [Z85.528] 09/02/2016 Not Applicable    Essential hypertension [I10] 09/23/2015 Yes      Problems Resolved During this Admission:       Discharged Condition: stable    Disposition: Home or Self Care    Follow Up:   Follow-up Information     [facility] Follow up.     Specialty: Home Health Services  Why: Home Health  Contact information:  [address]  [address]  [place] [address]  [phone]                       Patient Instructions:      Ambulatory referral/consult to Outpatient Case Management   Referral Priority: Routine Referral Type: Consultation   Referral Reason: Specialty Services Required   Number of Visits Requested: 1     Ambulatory referral/consult to Wound Clinic   Standing Status: Future   Referral Priority: Routine Referral Type: Consultation   Referral Reason: Specialty Services Required   Requested Specialty: Wound Care   Number of Visits Requested: 1     Diet renal     Notify your health care provider if you experience any of the following:  temperature >100.4     Notify your health care provider if you experience any of the following:  persistent nausea and vomiting or diarrhea     Notify your health care provider if you experience any of the following:  severe uncontrolled pain     Notify your health care provider if you experience any of the following:  difficulty breathing or increased cough     Notify your health care provider if you experience any of the following:  severe persistent headache     Notify your health care provider if you experience any of the following:  worsening rash     Notify your health care provider if you experience any of the following:  persistent dizziness, light-headedness, or visual disturbances     Notify your health care provider if you experience any of the following:  increased confusion or weakness     Activity as tolerated       Significant Diagnostic Studies: Labs: All labs within the past 24 hours have been reviewed    Pending Diagnostic Studies:     Procedure Component Value Units Date/Time    IR Angiogram Extremity Unilateral [[id number]] Resulted: 07/25/23 0746    Order Status: Sent Lab Status: In process Updated: 07/25/23 1350    IR PTA TIBIAL ARTERY LEFT [[id number]] Resulted: 07/25/23 1349    Order  Status: Sent Lab Status: In process Updated: 07/25/23 1350    IR Ultrasound Guidance [[id number]] Resulted: 07/25/23 0746    Order Status: Sent Lab Status: In process Updated: 07/25/23 1350         Medications:  Reconciled Home Medications:      Medication List      START taking these medications    aspirin 81 MG Chew  Take 1 tablet (81 mg total) by mouth once daily.     fluconazole 200 MG Tab  Commonly known as: DIFLUCAN  Take 2 tablets (400 mg total) by mouth 3 (three) times a week. Take only on dialysis days AFTER HD for 8 days  Start taking on: August 1, 2023     sacubitriL-valsartan 24-26 mg per tablet  Commonly known as: ENTRESTO  Take 1 tablet by mouth 2 (two) times daily.     sulfamethoxazole-trimethoprim 400-80mg 400-80 mg per tablet  Commonly known as: BACTRIM,SEPTRA  Take 1 tablet by mouth once daily. Take dose AFTER dialysis on dialysis days for 6 days        CONTINUE taking these medications    acetaminophen 500 MG tablet  Commonly known as: TYLENOL  Take 500 mg by mouth every 6 (six) hours as needed for Pain.     apixaban 2.5 mg Tab  Commonly known as: ELIQUIS  Take 1 tablet (2.5 mg total) by mouth 2 (two) times daily.     CABOMETYX 60 mg Tab  Generic drug: cabozantinib  TAKE ONE TABLET BY MOUTH ONCE DAILY AT THE SAME TIME ON AN EMPTY STOMACH AT LEAST 1 HOUR BEFORE OR 2 HOURS AFTER EATING. AVOID GRAPEFRUIT PRODUCTS     cloNIDine 0.3 mg/24 hr td ptwk 0.3 mg/24 hr  Commonly known as: CATAPRES  Place 1 patch onto the skin every 7 days.     cyclobenzaprine 5 MG tablet  Commonly known as: FLEXERIL  Take 1 tablet (5 mg total) by mouth daily as needed for Muscle spasms.     gabapentin 100 MG capsule  Commonly known as: NEURONTIN  Take 1 capsule (100 mg total) by mouth once daily.     hydrALAZINE 100 MG tablet  Commonly known as: APRESOLINE  Take 1 tablet (100 mg total) by mouth every 12 (twelve) hours.     HYDROcodone-acetaminophen 5-325 mg per tablet  Commonly known as: NORCO  Take 1 tablet by mouth every 6  (six) hours as needed for Pain.     levothyroxine 75 MCG tablet  Commonly known as: SYNTHROID  Take 1 tablet (75 mcg total) by mouth once daily.     LIDOcaine-prilocaine cream  Commonly known as: EMLA  APPLY ATLEAST 30 MINUTES BEFORE TREATMENT 3 TIMES A WEEK     metoprolol succinate 100 MG 24 hr tablet  Commonly known as: TOPROL-XL  Take 1/2 tablet (50mg) once daily     naloxone 1 mg/mL injection  Commonly known as: NARCAN  2 mg (1 mg per nostril) by Nasal route as needed for opioid overdose; may repeat in 3 to 5 minutes if not effective. Call 911     ONE-A-DAY WOMEN'S 50 PLUS 400-20 mcg Tab  Generic drug: mv,Ca,min-folic acid-vit K1  Take 1 tablet by mouth once daily.     polyethylene glycol 17 gram/dose powder  Commonly known as: GLYCOLAX  Take 17 g by mouth once daily.        STOP taking these medications    promethazine 25 MG tablet  Commonly known as: PHENERGAN            Indwelling Lines/Drains at time of discharge:   Lines/Drains/Airways     Central Venous Catheter Line  Duration                Hemodialysis Catheter left subclavian -- days    Permacath 01/25/23 0936 left subclavian 186 days         Hemodialysis AV Graft 04/19/23 1057 Left upper arm 102 days         Hemodialysis Catheter 07/21/23 1638 left internal jugular 8 days                Time spent on the discharge of patient: >35 minutes         Leonardo Robledo MD  Department of Hospital Medicine  [facility] - Med Surg

## 2023-07-31 ENCOUNTER — PATIENT OUTREACH (OUTPATIENT)
Dept: ADMINISTRATIVE | Facility: CLINIC | Age: 58
End: 2023-07-31
Payer: MEDICARE

## 2023-07-31 LAB
LEFT ABI: 1.75
LEFT DORSALIS PEDIS: 115 MMHG
LEFT POSTERIOR TIBIAL: 255 MMHG
RIGHT ABI: 1.75
RIGHT ARM BP: 146 MMHG
RIGHT DORSALIS PEDIS: 255 MMHG
RIGHT POSTERIOR TIBIAL: 255 MMHG

## 2023-07-31 NOTE — PROGRESS NOTES
C3 nurse attempted to contact Eva Bloom for a TCC post hospital discharge follow up call. No answer. Left voicemail with callback information. The patient has a scheduled HOSFU appointment with Sowmya Mccain MD on 08/16/23 @ 1500.

## 2023-08-01 NOTE — PROGRESS NOTES
2nd Attempt made to reach patient for TCC call. Left voicemail please call [phone] leave first name, last name, and  Yulisa will return your call.

## 2023-08-02 ENCOUNTER — TELEPHONE (OUTPATIENT)
Dept: FAMILY MEDICINE | Facility: CLINIC | Age: 58
End: 2023-08-02

## 2023-08-02 ENCOUNTER — OFFICE VISIT (OUTPATIENT)
Dept: FAMILY MEDICINE | Facility: CLINIC | Age: 58
End: 2023-08-02
Payer: MEDICARE

## 2023-08-02 VITALS
WEIGHT: 103 LBS | OXYGEN SATURATION: 97 % | TEMPERATURE: 98 F | HEART RATE: 77 BPM | DIASTOLIC BLOOD PRESSURE: 70 MMHG | SYSTOLIC BLOOD PRESSURE: 110 MMHG | BODY MASS INDEX: 17.68 KG/M2

## 2023-08-02 DIAGNOSIS — T82.7XXA ARTERIOVENOUS GRAFT INFECTION, INITIAL ENCOUNTER: ICD-10-CM

## 2023-08-02 DIAGNOSIS — Z09 HOSPITAL DISCHARGE FOLLOW-UP: Primary | ICD-10-CM

## 2023-08-02 DIAGNOSIS — N18.6 ESRD (END STAGE RENAL DISEASE) ON DIALYSIS: ICD-10-CM

## 2023-08-02 DIAGNOSIS — I70.0 AORTIC ATHEROSCLEROSIS: ICD-10-CM

## 2023-08-02 DIAGNOSIS — N18.6 ANEMIA IN ESRD (END-STAGE RENAL DISEASE): ICD-10-CM

## 2023-08-02 DIAGNOSIS — E03.9 HYPOTHYROIDISM, UNSPECIFIED TYPE: ICD-10-CM

## 2023-08-02 DIAGNOSIS — Z99.2 ESRD (END STAGE RENAL DISEASE) ON DIALYSIS: ICD-10-CM

## 2023-08-02 DIAGNOSIS — D63.1 ANEMIA IN ESRD (END-STAGE RENAL DISEASE): ICD-10-CM

## 2023-08-02 PROCEDURE — 99215 OFFICE O/P EST HI 40 MIN: CPT | Mod: PBBFAC,PO | Performed by: NURSE PRACTITIONER

## 2023-08-02 PROCEDURE — 99999 PR PBB SHADOW E&M-EST. PATIENT-LVL V: CPT | Mod: PBBFAC,,, | Performed by: NURSE PRACTITIONER

## 2023-08-02 PROCEDURE — 99214 OFFICE O/P EST MOD 30 MIN: CPT | Mod: S$PBB,,, | Performed by: NURSE PRACTITIONER

## 2023-08-02 PROCEDURE — 99999 PR PBB SHADOW E&M-EST. PATIENT-LVL V: ICD-10-PCS | Mod: PBBFAC,,, | Performed by: NURSE PRACTITIONER

## 2023-08-02 PROCEDURE — 99214 PR OFFICE/OUTPT VISIT, EST, LEVL IV, 30-39 MIN: ICD-10-PCS | Mod: S$PBB,,, | Performed by: NURSE PRACTITIONER

## 2023-08-02 NOTE — PROGRESS NOTES
3rd Attempt made to reach patient for TCC call. Left voicemail please call [phone] leave first name, last name, and  Yulisa will return your call.

## 2023-08-02 NOTE — PATIENT INSTRUCTIONS
Medical Fitness--[phone]  Imaging, Xray, CT, MRI, Ultrasound---[phone]  Bariatrics---[phone]  Breast Surgery---[phone]  Case Management---[phone]  Colonoscopy---[phone]  DME---[phone]  Infectious Disease---[phone]  Interventional Radiology---[phone]  Medical Records---[phone]  [facility]---[phone]  Optometry/Ophthalmology---[phone]  [facility] Bar---[phone]  Physical Therapy---[phone]  Psychiatry---[phone] or [phone]  Plastic Surgery---[phone]  Recovery--[phone] option 2, or [phone].  Sleep Study---[phone]  Smoking Cessation---[phone]  Wound Care---[phone]  Referral Desk---[phone]

## 2023-08-02 NOTE — TELEPHONE ENCOUNTER
Spoke with  therapist. States vitals normal but patient complained of fatigue. Recommended she advise patient to speak with her dialysis team tomorrow to see if they want to draw labs. We know she is very ill and is chronically anemic. I do not see any reason for outpatient labs at this time. Patient should seek immediate care in ED for any acute changes in her medical condition.  Therapist will relay information to  and nurse.

## 2023-08-02 NOTE — TELEPHONE ENCOUNTER
Home  nurse called stating vital are good but pt was very lethargic, had dialysis on yesterday, labs will drawn by dialysis on Tuesday . Asking if any labs can be done to see why patient lethargic

## 2023-08-02 NOTE — PROGRESS NOTES
HPI     Chief Complaint:  Hospital follow up    Eva Bloom is a 57 y.o. female with multiple medical diagnoses as listed in the medical history and problem list that presents for hospital follow up.  Pt is known to me with his her last appointment 7/6/2023.      Pt's spouse is present during exam.     Recent hospital encounter. See below encounter note from 7/29/2023.    HPI:   Eva Bloom 57 y.o. female with afib on eliquis, HTN, CHF, ESRD on HD T/TH/S, RCC presents to the hospital chief complaint of toe wound.  She reports 1 month an ulcer to left toe that occurred after a chair rolled over foot.  He is had increasing pain to the toe described as a throbbing.  She attempted treatment at home with ice without improvement.  She is dialyzed Tuesday Thursday Saturday at [facility] in [place].  She no longer makes urine.  She denies fever chest pain shortness breast nausea vomiting abdominal pain melena hematuria hematemesis dizziness syncope.     In the ED, afebrile without leukocytosis x-ray foot without fracture or dislocation potassium 4.3 lactic acid negative heme-positive stool.        Procedure(s) (LRB):  LEFT ARM ARTERIOVENOUS GRAFT EXCISION  AND LIGATION (Left)       Hospital Course:   Eva Bloom 57 y.o. female with afib on eliquis, HTN, CHF, ESRD on HD T/TH/S, RCC presents to the hospital chief complaint of toe wound.  She reports 1 month an ulcer to left toe that occurred after a chair rolled over foot.  He is had increasing pain to the toe described as a throbbing.  She attempted treatment at home with ice without improvement.  She is dialyzed Tuesday Thursday Saturday at [facility] in [place].  She no longer makes urine.  She denies fever chest pain shortness breast nausea vomiting abdominal pain melena hematuria hematemesis dizziness syncope.     In the ED, patient found to be afebrile without leukocytosis.  X-ray of the foot did not show any fracture or dislocation.  There was also  concern for left AV graft infection.  Podiatry was consulted and there was concern for peripheral artery disease.  Ultrasounds arterial bilaterally showed bilateral 50% stenosis.  Vascular surgery consulted.  Patient was found to have a left AV graft infection and underwent excision and ligation on 7/21/23.  She had a new THDC placed by IR for new access while AVG heals. Cultures from AVG growing  Achromobacter yxlosoxidans, staph epidermidis and yeast. ID consulted for antibiotic recommendations. Underwent angiogram and succesfull revascularization of LLE by Vascular Surgery on 7/25. Okay to restart eliquis.  Podiatry discussed with patient about toe wound and plan is for wound care, antibiotics and follow-up as outpatient.     Overall, patient doing much better and stable for discharge.  She will take renally dosed Bactrim SS daily until 8/4 and fluconazole 3 times a week after dialysis on dialysis days until 8/9.  Prescription sent to pharmacy and reviewed with patient and her .  Wound care discussed with patient and her  as well and referral placed for outpatient.  Home health continued.  Patient discharged home in stable condition.           Assessment & Plan     Problem List Items Addressed This Visit          Cardiac/Vascular    Aortic atherosclerosis    -assessed and addressed all modifiable risk factors.  Continue with appropriate management to prevent complications with regards to lipid and BP monitoring.         Renal/    ESRD (end stage renal disease) on dialysis    Continue HD Tu, Th, Sat.     Using Hemodialysis-CV Catheter-Tunneled, Chest, Left Subclavian    Overview     - Tuesday, Thursday, and Saturday  - [place]  - Dr. Hart, nephrology            ID    Arteriovenous graft infection    As below.       Oncology    Anemia in ESRD (end-stage renal disease)    The current medical regimen is effective;  continue present plan         Endocrine    Hypothyroidism    The current medical  regimen is effective;  continue present plan       Other Visit Diagnoses       Hospital discharge follow-up    -  Primary    Hospital encounter notes, objective/subjective data, diagnostics, and plan of care from 7/29 reviewed.    Afebrile. VSS. Reports pain has improved.     Compliant with abx. Denies AE.      is providing wound care and physical therapy.     Follow up with vascular surgery on 8/18/23.    Transitional Care Note    Family and/or Caretaker present at visit?  Yes.  Diagnostic tests reviewed/disposition: I have reviewed all completed as well as pending diagnostic tests at the time of discharge.  Disease/illness education: completed  Home health/community services discussion/referrals: Patient has home health established at home.   Establishment or re-establishment of referral orders for community resources: No other necessary community resources.   Discussion with other health care providers: No discussion with other health care providers necessary.     Discussed condition, and treatment.   Education sent to patient portal/included in after visit summary.  ED precautions given.   Notify provider if symptoms do not resolve or increase in severity.   Patient verbalizes understanding and agrees with plan of care.            --------------------------------------------      Health Maintenance:  Health Maintenance         Date Due Completion Date    COVID-19 Vaccine (4 - Moderna series) 01/06/2022 11/11/2021    Influenza Vaccine (1) 09/01/2023 10/15/2022    Lipid Panel 10/22/2023 10/22/2022    Hemoglobin A1c 10/22/2023 10/22/2022    Cervical Cancer Screening 11/16/2023 11/16/2018    Mammogram 11/16/2023 11/16/2022    TETANUS VACCINE 09/23/2025 9/23/2015    Colorectal Cancer Screening 03/12/2028 3/12/2021    Pneumococcal Vaccines (Age 0-64) (4 - PPSV23 or PCV20) 10/01/2029 11/9/2016            Advised patient on the importance of completing overdue health maintenance items    Follow Up:  Follow up in about  3 months (around 11/2/2023), or if symptoms worsen or fail to improve.    Exam     Review of Systems:  (as noted above)  Review of Systems   Constitutional:  Negative for fever.   HENT:  Negative for trouble swallowing.    Eyes:  Negative for visual disturbance.   Respiratory:  Negative for chest tightness and shortness of breath.    Cardiovascular:  Negative for chest pain.   Gastrointestinal:  Negative for blood in stool.   Skin:  Positive for wound (see PE).   Neurological:  Positive for weakness (generalized).       Physical Exam:   Physical Exam  Constitutional:       General: She is not in acute distress.     Appearance: She is underweight. She is not ill-appearing or diaphoretic.       HENT:      Head: Normocephalic and atraumatic.   Cardiovascular:      Rate and Rhythm: Normal rate and regular rhythm.      Heart sounds: No murmur heard.     No friction rub. No gallop.   Pulmonary:      Effort: No respiratory distress.   Chest:      Chest wall: No tenderness.   Musculoskeletal:      Cervical back: No rigidity.   Skin:     Capillary Refill: Capillary refill takes 2 to 3 seconds.   Neurological:      General: No focal deficit present.      Mental Status: She is alert and oriented to person, place, and time.                                   Vitals:    08/02/23 0942   BP: 110/70   BP Location: Right arm   Patient Position: Sitting   BP Method: Medium (Manual)   Pulse: 77   Temp: 97.7 °F (36.5 °C)   TempSrc: Oral   SpO2: 97%   Weight: 46.7 kg (103 lb)      Body mass index is 17.68 kg/m².        History     Past Medical History:  Past Medical History:   Diagnosis Date    Anemia     Atrial fibrillation     Bronchitis 03/01/2017    Cancer 2016    kidney cancer    CHF (congestive heart failure), NYHA class II, chronic, systolic     CMV (cytomegalovirus) antibody positive     Encounter for blood transfusion     ESRD (end stage renal disease)     Essential hypertension 09/23/2015    H/O herpes simplex type 2 infection      Herpes simplex type 1 antibody positive     History of kidney cancer     s/p left nephrectomy 1/2016    Hyperparathyroidism, unspecified     Hyperuricemia without signs of inflammatory arthritis and tophaceous disease     Kidney stones     LGSIL (low grade squamous intraepithelial dysplasia)     Myocardiopathy 07/21/2017    Prediabetes     Proteinuria     Renal disorder     Thyroid nodule     Urate nephropathy        Past Surgical History:  Past Surgical History:   Procedure Laterality Date    BREAST CYST EXCISION      COLONOSCOPY N/A 11/12/2015    COLONOSCOPY N/A 3/12/2021    Procedure: COLONOSCOPY;  Surgeon: Brendon Lanier MD;  Location: [facility] ENDO;  Service: Endoscopy;  Laterality: N/A;  covid test 3/9, labs prior, prep instr mailed -ml    INSERTION OF BIVENTRICULAR IMPLANTABLE CARDIOVERTER-DEFIBRILLATOR (ICD)  04/2021    INSERTION OF TUNNELED CENTRAL VENOUS CATHETER (CVC) WITH SUBCUTANEOUS PORT N/A 1/25/2023    Procedure: INSERTION, DUAL LUMEN CATHETER WITH PORT, WITH IMAGING GUIDANCE;  Surgeon: FARIDEH Muñoz III, MD;  Location: [facility] OR [address] [place] [address];  Service: Peripheral Vascular;  Laterality: N/A;  possinble permcath placment    NEPHRECTOMY-LAPAROSCOPIC Left 01/12/2016    PERCUTANEOUS TRANSLUMINAL ANGIOPLASTY OF ARTERIOVENOUS FISTULA Left 4/19/2023    Procedure: PTA, AV FISTULA;  Surgeon: FARIDEH Muñoz III, MD;  Location: [facility] CATH LAB;  Service: Peripheral Vascular;  Laterality: Left;    PERITONEAL CATHETER INSERTION      Permacath insertion  01/12/2017    PLACEMENT OF ARTERIOVENOUS GRAFT  12/28/2022    Procedure: INSERTION, GRAFT, ARTERIOVENOUS;  Surgeon: FARIDEH Muñoz III, MD;  [facility];  Service: Peripheral Vascular;;    REMOVAL, GRAFT, ARTERIOVENOUS, UPPER EXTREMITY Left 1/25/2023    Procedure: REMOVAL, GRAFT, ARTERIOVENOUS, UPPER EXTREMITY;  Surgeon: FARIDEH Muñoz III, MD;  [facility];  Service: Peripheral Vascular;  Laterality: Left;    REVISION OF ARTERIOVENOUS  FISTULA Left 5/1/2023    Procedure: REVISION, AV FISTULA;  Surgeon: FARIDEH Muñoz III, MD;  Location: [facility] OR 2ND FLR;  Service: Peripheral Vascular;  Laterality: Left;  LUE AVG revision    REVISION OF PROCEDURE INVOLVING ARTERIOVENOUS GRAFT Left 12/28/2022    Procedure: REVISION, PROCEDURE INVOLVING ARTERIOVENOUS GRAFT;  Surgeon: FARIDEH Muñoz III, MD;  Location: [facility] OR 2ND FLR;  Service: Peripheral Vascular;  Laterality: Left;    REVISION OF PROCEDURE INVOLVING ARTERIOVENOUS GRAFT Left 7/21/2023    Procedure: LEFT ARM ARTERIOVENOUS GRAFT EXCISION  AND LIGATION;  Surgeon: Obi Werner MD;  Location: [facility] OR;  Service: Vascular;  Laterality: Left;  Left AVG excision and revision with interposition    SALPINGOOPHORECTOMY Right 2016    KJB---DAVINCI    TONSILLECTOMY      TUBAL LIGATION         Social History:  Social History     Socioeconomic History    Marital status:     Number of children: 2   Occupational History    Occupation: [employer]     Employer: [id number]   Tobacco Use    Smoking status: Never    Smokeless tobacco: Never   Substance and Sexual Activity    Alcohol use: No     Comment: . 2 children. works at [employer].    Drug use: Never     Social Determinants of Health     Financial Resource Strain: Low Risk  (5/23/2022)    Overall Financial Resource Strain (CARDIA)     Difficulty of Paying Living Expenses: Not hard at all   Food Insecurity: No Food Insecurity (5/23/2022)    Hunger Vital Sign     Worried About Running Out of Food in the Last Year: Never true     Ran Out of Food in the Last Year: Never true   Transportation Needs: No Transportation Needs (5/23/2022)    PRAPARE - Transportation     Lack of Transportation (Medical): No     Lack of Transportation (Non-Medical): No   Physical Activity: Insufficiently Active (5/23/2022)    Exercise Vital Sign     Days of Exercise per Week: 3 days     Minutes of Exercise per Session: 30 min   Stress: No Stress Concern Present  (5/23/2022)    [employer] [employer] - Occupational Stress Questionnaire     Feeling of Stress : Not at all   Social Connections: Socially Integrated (5/23/2022)    Social Connection and Isolation Panel [NHANES]     Frequency of Communication with Friends and Family: More than three times a week     Frequency of Social Gatherings with Friends and Family: Once a week     Attends Episcopal Services: 1 to 4 times per year     Active Member of Clubs or Organizations: Yes     Attends Club or Organization Meetings: 1 to 4 times per year     Marital Status:    Housing Stability: Low Risk  (5/23/2022)    Housing Stability Vital Sign     Unable to Pay for Housing in the Last Year: No     Number of Places Lived in the Last Year: 1     Unstable Housing in the Last Year: No       Family History:  Family History   Problem Relation Age of Onset    Hypertension Mother     Diabetes Father     Kidney disease Father     No Known Problems Sister     Heart disease Sister     No Known Problems Sister     No Known Problems Brother     No Known Problems Brother     Cataracts Maternal Aunt     No Known Problems Maternal Uncle     No Known Problems Paternal Aunt     No Known Problems Paternal Uncle     No Known Problems Maternal Grandmother     Diabetes Maternal Grandfather     No Known Problems Paternal Grandmother     No Known Problems Paternal Grandfather     No Known Problems Son     No Known Problems Son     No Known Problems Other     Breast cancer Neg Hx     Colon cancer Neg Hx     Cancer Neg Hx     Stroke Neg Hx     Amblyopia Neg Hx     Blindness Neg Hx     Glaucoma Neg Hx     Macular degeneration Neg Hx     Retinal detachment Neg Hx     Strabismus Neg Hx     Thyroid disease Neg Hx        Allergies and Medications: (updated and reviewed)  Review of patient's allergies indicates:   Allergen Reactions    Coreg [carvedilol] Other (See Comments)     Nausea/vomiting    Allopurinol      Other reaction(s):  abnormal transaminases     Current Outpatient Medications   Medication Sig Dispense Refill    acetaminophen (TYLENOL) 500 MG tablet Take 500 mg by mouth every 6 (six) hours as needed for Pain.      apixaban (ELIQUIS) 2.5 mg Tab Take 1 tablet (2.5 mg total) by mouth 2 (two) times daily. 60 tablet 11    aspirin 81 MG Chew Take 1 tablet (81 mg total) by mouth once daily. 30 tablet 11    cabozantinib (CABOMETYX) 60 mg Tab TAKE ONE TABLET BY MOUTH ONCE DAILY AT THE SAME TIME ON AN EMPTY STOMACH AT LEAST 1 HOUR BEFORE OR 2 HOURS AFTER EATING. AVOID GRAPEFRUIT PRODUCTS 30 tablet 4    cloNIDine 0.3 mg/24 hr td ptwk (CATAPRES) 0.3 mg/24 hr Place 1 patch onto the skin every 7 days. 4 patch 11    cyclobenzaprine (FLEXERIL) 5 MG tablet Take 1 tablet (5 mg total) by mouth daily as needed for Muscle spasms. 30 tablet 1    fluconazole (DIFLUCAN) 200 MG Tab Take 2 tablets (400 mg total) by mouth 3 (three) times a week. Take only on dialysis days AFTER HD for 8 days 4 tablet 0    gabapentin (NEURONTIN) 100 MG capsule Take 1 capsule (100 mg total) by mouth once daily. 30 capsule 11    HYDROcodone-acetaminophen (NORCO) 5-325 mg per tablet Take 1 tablet by mouth every 6 (six) hours as needed for Pain. 10 tablet 0    levothyroxine (SYNTHROID) 75 MCG tablet Take 1 tablet (75 mcg total) by mouth once daily. 30 tablet 11    lidocaine-prilocaine (EMLA) cream APPLY ATLEAST 30 MINUTES BEFORE TREATMENT 3 TIMES A WEEK 30 g 11    metoprolol succinate (TOPROL-XL) 100 MG 24 hr tablet Take 1/2 tablet (50mg) once daily 90 tablet 3    mv,Ca,min-folic acid-vit K1 (ONE-A-DAY WOMEN'S 50 PLUS) 400-20 mcg Tab Take 1 tablet by mouth once daily.      naloxone (NARCAN) 1 mg/mL injection 2 mg (1 mg per nostril) by Nasal route as needed for opioid overdose; may repeat in 3 to 5 minutes if not effective. Call 911 2 mL 11    sacubitriL-valsartan (ENTRESTO) 24-26 mg per tablet Take 1 tablet by mouth 2 (two) times daily. 60 tablet 3     sulfamethoxazole-trimethoprim 400-80mg (BACTRIM,SEPTRA) 400-80 mg per tablet Take 1 tablet by mouth once daily. Take dose AFTER dialysis on dialysis days for 6 days 6 tablet 0    hydrALAZINE (APRESOLINE) 100 MG tablet Take 1 tablet (100 mg total) by mouth every 12 (twelve) hours. 180 tablet 2    polyethylene glycol (GLYCOLAX) 17 gram/dose powder Take 17 g by mouth once daily. (Patient not taking: Reported on 8/2/2023) 235 g 0     No current facility-administered medications for this visit.     Facility-Administered Medications Ordered in Other Visits   Medication Dose Route Frequency Provider Last Rate Last Admin    0.9%  NaCl infusion   Intravenous Continuous Soni Bhatti MD   New Bag at 07/21/23 1116       Patient Care Team:  Sowmya Mccain MD as PCP - General (Internal Medicine)  Ben Rivera MD as Consulting Physician (Hematology and Oncology)  Leora Louis MD (Cardiology)  Lulu Le LPN as Licensed Practical Nurse  Alexandra as Post Acute Care Provider (Dialysis Center)  Claude Allan MD as Consulting Physician (Nephrology)         - The patient is given an After Visit Summary that lists all medications with directions, allergies, education, orders placed during this encounter and follow-up instructions.      - I have reviewed the patient's medical information including past medical, family, and social history sections including the medications and allergies.      - We discussed the patient's current medications.     This note was created by combination of typed  and MModal dictation.  Transcription errors may be present.  If there are any questions, please contact me.       Emiliano Huerta NP

## 2023-08-02 NOTE — TELEPHONE ENCOUNTER
----- Message from Pallavi Puentes sent at 8/2/2023  1:23 PM CDT -----  Regarding: [facility] [phone]  .Type: Patient Call Back    Who called: [facility]    What is the request in detail: Pt had a visit today, she was lethargic and Physical Therapy nurse is requesting to get orders drawn to see what's causing pt drowsiness as soon as possible.      Can the clinic reply by [facility]? no    Would the patient rather a call back or a response via [facility]? Call back    Best call back number . [phone]

## 2023-08-04 ENCOUNTER — DOCUMENT SCAN (OUTPATIENT)
Dept: HOME HEALTH SERVICES | Facility: HOSPITAL | Age: 58
End: 2023-08-04
Payer: MEDICARE

## 2023-08-11 ENCOUNTER — OFFICE VISIT (OUTPATIENT)
Dept: PODIATRY | Facility: CLINIC | Age: 58
End: 2023-08-11
Payer: MEDICARE

## 2023-08-11 ENCOUNTER — OFFICE VISIT (OUTPATIENT)
Dept: FAMILY MEDICINE | Facility: CLINIC | Age: 58
End: 2023-08-11
Payer: MEDICARE

## 2023-08-11 VITALS — BODY MASS INDEX: 17.57 KG/M2 | WEIGHT: 102.94 LBS | HEIGHT: 64 IN

## 2023-08-11 VITALS
WEIGHT: 102.94 LBS | DIASTOLIC BLOOD PRESSURE: 72 MMHG | BODY MASS INDEX: 17.57 KG/M2 | SYSTOLIC BLOOD PRESSURE: 110 MMHG | HEART RATE: 79 BPM | OXYGEN SATURATION: 97 % | TEMPERATURE: 98 F | HEIGHT: 64 IN

## 2023-08-11 DIAGNOSIS — N18.6 ANEMIA IN ESRD (END-STAGE RENAL DISEASE): ICD-10-CM

## 2023-08-11 DIAGNOSIS — N18.6 ESRD (END STAGE RENAL DISEASE) ON DIALYSIS: ICD-10-CM

## 2023-08-11 DIAGNOSIS — I27.20 PULMONARY HYPERTENSION: ICD-10-CM

## 2023-08-11 DIAGNOSIS — N25.81 HYPERPARATHYROIDISM, SECONDARY RENAL: ICD-10-CM

## 2023-08-11 DIAGNOSIS — I10 ESSENTIAL HYPERTENSION: ICD-10-CM

## 2023-08-11 DIAGNOSIS — Z99.2 ESRD (END STAGE RENAL DISEASE) ON DIALYSIS: ICD-10-CM

## 2023-08-11 DIAGNOSIS — I70.262 CRITICAL LIMB ISCHEMIA OF LEFT LOWER EXTREMITY WITH GANGRENE: ICD-10-CM

## 2023-08-11 DIAGNOSIS — I42.8 CARDIOMYOPATHY, NONISCHEMIC: ICD-10-CM

## 2023-08-11 DIAGNOSIS — E03.9 HYPOTHYROIDISM, UNSPECIFIED TYPE: ICD-10-CM

## 2023-08-11 DIAGNOSIS — I50.42 CHRONIC COMBINED SYSTOLIC AND DIASTOLIC CONGESTIVE HEART FAILURE: ICD-10-CM

## 2023-08-11 DIAGNOSIS — I48.0 PAF (PAROXYSMAL ATRIAL FIBRILLATION): ICD-10-CM

## 2023-08-11 DIAGNOSIS — C78.00 METASTATIC RENAL CELL CARCINOMA TO LUNG, UNSPECIFIED LATERALITY: ICD-10-CM

## 2023-08-11 DIAGNOSIS — I73.9 PAD (PERIPHERAL ARTERY DISEASE): Primary | ICD-10-CM

## 2023-08-11 DIAGNOSIS — D63.1 ANEMIA IN ESRD (END-STAGE RENAL DISEASE): ICD-10-CM

## 2023-08-11 DIAGNOSIS — Z85.528 HISTORY OF KIDNEY CANCER: ICD-10-CM

## 2023-08-11 DIAGNOSIS — Z00.00 ENCOUNTER FOR PREVENTIVE HEALTH EXAMINATION: Primary | ICD-10-CM

## 2023-08-11 DIAGNOSIS — I70.0 AORTIC ATHEROSCLEROSIS: ICD-10-CM

## 2023-08-11 DIAGNOSIS — Z90.5 S/P NEPHRECTOMY: Chronic | ICD-10-CM

## 2023-08-11 DIAGNOSIS — L97.529 ISCHEMIC TOE ULCER, LEFT, WITH UNSPECIFIED SEVERITY: ICD-10-CM

## 2023-08-11 DIAGNOSIS — C64.9 METASTATIC RENAL CELL CARCINOMA TO LUNG, UNSPECIFIED LATERALITY: ICD-10-CM

## 2023-08-11 DIAGNOSIS — L97.529: ICD-10-CM

## 2023-08-11 LAB
LEFT ABI: 1.55
LEFT DORSALIS PEDIS: 255 MMHG
LEFT POSTERIOR TIBIAL: 255 MMHG
LEFT TBI: 1.55
LEFT TOE PRESSURE: 255 MMHG
RIGHT ABI: 1.55
RIGHT ARM BP: 165 MMHG
RIGHT DORSALIS PEDIS: 255 MMHG
RIGHT POSTERIOR TIBIAL: 255 MMHG
RIGHT TBI: 0.27
RIGHT TOE PRESSURE: 44 MMHG

## 2023-08-11 PROCEDURE — G0439 PPPS, SUBSEQ VISIT: HCPCS | Mod: ,,, | Performed by: NURSE PRACTITIONER

## 2023-08-11 PROCEDURE — G0439 PR MEDICARE ANNUAL WELLNESS SUBSEQUENT VISIT: ICD-10-PCS | Mod: ,,, | Performed by: NURSE PRACTITIONER

## 2023-08-11 PROCEDURE — 99215 OFFICE O/P EST HI 40 MIN: CPT | Mod: PBBFAC,PO | Performed by: NURSE PRACTITIONER

## 2023-08-11 PROCEDURE — 99999 PR PBB SHADOW E&M-EST. PATIENT-LVL V: CPT | Mod: PBBFAC,,, | Performed by: NURSE PRACTITIONER

## 2023-08-11 PROCEDURE — 99213 PR OFFICE/OUTPT VISIT, EST, LEVL III, 20-29 MIN: ICD-10-PCS | Mod: S$PBB,,, | Performed by: PODIATRIST

## 2023-08-11 PROCEDURE — 99213 OFFICE O/P EST LOW 20 MIN: CPT | Mod: S$PBB,,, | Performed by: PODIATRIST

## 2023-08-11 PROCEDURE — 99999 PR PBB SHADOW E&M-EST. PATIENT-LVL IV: CPT | Mod: PBBFAC,,, | Performed by: PODIATRIST

## 2023-08-11 PROCEDURE — 99999 PR PBB SHADOW E&M-EST. PATIENT-LVL V: ICD-10-PCS | Mod: PBBFAC,,, | Performed by: NURSE PRACTITIONER

## 2023-08-11 PROCEDURE — 99999 PR PBB SHADOW E&M-EST. PATIENT-LVL IV: ICD-10-PCS | Mod: PBBFAC,,, | Performed by: PODIATRIST

## 2023-08-11 PROCEDURE — 99214 OFFICE O/P EST MOD 30 MIN: CPT | Mod: PBBFAC,27,PO | Performed by: PODIATRIST

## 2023-08-11 NOTE — PROGRESS NOTES
"  Eva Bloom presented for a  Medicare AWV and comprehensive Health Risk Assessment today. The following components were reviewed and updated:    Medical history  Family History  Social history  Allergies and Current Medications  Health Risk Assessment  Health Maintenance  Care Team       ** See Completed Assessments for Annual Wellness Visit within the encounter summary.**       The following assessments were completed:  Living Situation  CAGE  Depression Screening  Timed Get Up and Go  Whisper Test  Cognitive Function Screening  Nutrition Screening  ADL Screening  PAQ Screening          Vitals:    08/11/23 0743   BP: 110/72   BP Location: Right arm   Patient Position: Sitting   BP Method: Small (Manual)   Pulse: 79   Temp: 97.9 °F (36.6 °C)   TempSrc: Oral   SpO2: 97%   Weight: 46.7 kg (102 lb 15.3 oz)   Height: 5' 4" (1.626 m)     Body mass index is 17.67 kg/m².  Physical Exam  Vitals and nursing note reviewed.   Constitutional:       Appearance: Normal appearance.   Cardiovascular:      Rate and Rhythm: Normal rate.      Pulses: Normal pulses.      Heart sounds: Normal heart sounds.   Pulmonary:      Effort: Pulmonary effort is normal.      Breath sounds: Normal breath sounds.   Musculoskeletal:      Comments: ambulates with a walker   Neurological:      Mental Status: She is alert and oriented to person, place, and time.   Psychiatric:         Mood and Affect: Mood normal.         Behavior: Behavior normal.             Diagnoses and health risks identified today and associated recommendations/orders:    1. Encounter for preventive health examination  Pt, accompanied by her , was seen today for an Annual Wellness visit. Healthcare maintenance and screening recommendations were discussed and updated as indicated. Return in one year for AWV.    Review current opioid prescriptions:n/a  Screen for potential Substance Use Disorders:n/a    2. Aortic atherosclerosis  The current medical regimen is effective;  " continue present plan and medications.    3. PAF (paroxysmal atrial fibrillation)  Rate controlled. The current medical regimen is effective;  continue present plan and medications.    4. Cardiomyopathy, nonischemic  The current medical regimen is effective;  continue present plan and medications.    5. Pulmonary hypertension  The current medical regimen is effective;  continue present plan and medications.    6. Chronic combined systolic and diastolic congestive heart failure  Asymptomatic. The current medical regimen is effective;  continue present plan and medications.    7. ESRD (end stage renal disease) on dialysis  Dialysis Tues-Thurs-Sat. The current medical regimen is effective;  continue present plan and medications.    8. Anemia in ESRD (end-stage renal disease)  The current medical regimen is effective;  continue present plan and medications.    9. Hyperparathyroidism, secondary renal  The current medical regimen is effective;  continue present plan and medications.    10. Metastatic renal cell carcinoma to lung, unspecified laterality  The current medical regimen is effective;  continue present plan and medications.    11. Ischemic ulcer of toe of left foot, with unspecified severity  The current medical regimen is effective;  continue present plan and medications.    12. Critical limb ischemia of left lower extremity with gangrene  The current medical regimen is effective;  continue present plan and medications.    13. Essential hypertension  The current medical regimen is effective;  continue present plan and medications.    14. Hypothyroidism, unspecified type  The current medical regimen is effective;  continue present plan and medications.    15. History of kidney cancer  The current medical regimen is effective;  continue present plan and medications.    16. S/p nephrectomy left  The current medical regimen is effective;  continue present plan and medications.        Provided [place] with a 5-10 year  written screening schedule and personal prevention plan. Recommendations were developed using the USPSTF age appropriate recommendations. Education, counseling, and referrals were provided as needed. After Visit Summary printed and given to patient which includes a list of additional screenings\tests needed.    Follow up in about 1 year (around 8/11/2024).    TONA Nagy  I offered to discuss advanced care planning, including how to pick a person who would make decisions for you if you were unable to make them for yourself, called a health care power of , and what kind of decisions you might make such as use of life sustaining treatments such as ventilators and tube feeding when faced with a life limiting illness recorded on a living will that they will need to know. (How you want to be cared for as you near the end of your natural life)     X Patient is interested in learning more about how to make advanced directives.  I provided them paperwork and offered to discuss this with them.

## 2023-08-11 NOTE — PATIENT INSTRUCTIONS
Counseling and Referral of Other Preventative  (Italic type indicates deductible and co-insurance are waived)    Patient Name: Eva Bloom  Today's Date: 8/11/2023    Health Maintenance       Date Due Completion Date    COVID-19 Vaccine (4 - Moderna series) 01/06/2022 11/11/2021    Influenza Vaccine (1) 09/01/2023 10/15/2022    Lipid Panel 10/22/2023 10/22/2022    Hemoglobin A1c 10/22/2023 10/22/2022    Cervical Cancer Screening 11/16/2023 11/16/2018    Mammogram 11/16/2023 11/16/2022    TETANUS VACCINE 09/23/2025 9/23/2015    Colorectal Cancer Screening 03/12/2028 3/12/2021    Pneumococcal Vaccines (Age 0-64) (4 - PPSV23 or PCV20) 10/01/2029 11/9/2016        No orders of the defined types were placed in this encounter.    The following information is provided to all patients.  This information is to help you find resources for any of the problems found today that may be affecting your health:                Living healthy guide: www.[facility].[place].gov      Understanding Diabetes: www.diabetes.org      Eating healthy: www.cdc.gov/healthyweight      CDC home safety checklist: www.cdc.gov/steadi/patient.html      Agency on Aging: www.goea.[place].gov      Alcoholics anonymous (AA): www.aa.org      Physical Activity: www.dacia.nih.gov/sx1pqjh      Tobacco use: www.quitwithusla.org

## 2023-08-11 NOTE — PROGRESS NOTES
Subjective:      Patient ID: Eva Bloom is a 57 y.o. female.    Chief Complaint: Follow-up    Ulcer/gangrene left 4th toe and right 1st toe.  Gradual onset, improving slowly over past 2 weeks, aggravated by increased weight bearing, shoe gear, pressure.  Betadine swabs, open toe shoes, angioplasty all helping.. Denies trauma, surgery.    7/27/2023 angioplasty successfuly        Review of Systems   Constitutional: Negative for chills, diaphoresis, fever, malaise/fatigue and night sweats.   Cardiovascular:  Positive for leg swelling. Negative for claudication, cyanosis and syncope.   Skin:  Positive for poor wound healing. Negative for color change, dry skin, nail changes, rash, suspicious lesions and unusual hair distribution.   Musculoskeletal:  Negative for falls, joint pain, joint swelling, muscle cramps, muscle weakness and stiffness.   Gastrointestinal:  Negative for constipation, diarrhea, nausea and vomiting.   Neurological:  Positive for sensory change. Negative for brief paralysis, disturbances in coordination, focal weakness, numbness, paresthesias and tremors.         Objective:      Physical Exam  Constitutional:       General: She is not in acute distress.     Appearance: She is well-developed. She is not diaphoretic.   Cardiovascular:      Pulses:           Dorsalis pedis pulses are 1+ on the right side and 1+ on the left side.        Posterior tibial pulses are 1+ on the right side and 1+ on the left side.      Comments: Capillary refill 3 seconds all toes/distal feet, all toes/both feet warm to touch.      Negative lymphadenopathy bilateral popliteal fossa and tarsal tunnel.      <2+ pitting lower extremity edema bilateral.    Musculoskeletal:      Right ankle: No swelling, deformity, ecchymosis or lacerations. Normal range of motion. Normal pulse.      Right Achilles Tendon: Normal. No defects. Manning's test negative.   Lymphadenopathy:      Lower Body: No right inguinal adenopathy. No left  inguinal adenopathy.      Comments: Negative lymphadenopathy bilateral popliteal fossa and tarsal tunnel.    Negative lymphangitic streaking bilateral feet/ankles/legs.   Skin:     General: Skin is warm and dry.      Capillary Refill: Capillary refill takes 2 to 3 seconds.      Coloration: Skin is not pale.      Findings: No abrasion, bruising, burn, ecchymosis, erythema, laceration, lesion or rash.      Nails: There is no clubbing.      Comments:   Focal area dorsal medial left 4th toe pipj dry indurated gangrene demarcating  without ulceration, drainage, pus, tracking, fluctuance, malodor, or cardinal signs infection.       Focal spot of dorsal right halulx proximal nail bed demarcating focal fdry indurated gangrene  without ulceration, drainage, pus, tracking, fluctuance, malodor, or cardinal signs infection.      Otherwise, Skin thin, shiny, atrophic, with decreased density and distribution of pedal hair bilateral, but without hyperpigmentation, daniele discoloration,  ulcers, masses, nodules or cords palpated bilateral feet and legs.    Neurological:      Mental Status: She is alert and oriented to person, place, and time.      Sensory: No sensory deficit.      Motor: No tremor, atrophy or abnormal muscle tone.      Gait: Gait normal.      Deep Tendon Reflexes:      Reflex Scores:       Patellar reflexes are 2+ on the right side and 2+ on the left side.       Achilles reflexes are 2+ on the right side and 2+ on the left side.     Comments: Negative tinel sign to percussion sural, superficial peroneal, deep peroneal, saphenous, and posterior tibial nerves right and left ankles and feet.     Psychiatric:         Behavior: Behavior is cooperative.           Assessment:       Encounter Diagnoses   Name Primary?    PAD (peripheral artery disease) Yes    Ischemic toe ulcer, left, with unspecified severity          Plan:       Eva was seen today for follow-up.    Diagnoses and all orders for this visit:    PAD  (peripheral artery disease)    Ischemic toe ulcer, left, with unspecified severity      I counseled the patient on her conditions, their implications and medical management.        Continue betadine swabs daily left 4th toe and right 1st toe.  Cover with band aid if desired.  Continue open toe shoe (surgical shoes bilateral today)    Keep all vascualr follow up.    Continue HH.    2 weeks, assessment/evaluation, sooner prn.          Follow up in about 2 weeks (around 8/25/2023).

## 2023-08-16 ENCOUNTER — TELEPHONE (OUTPATIENT)
Dept: FAMILY MEDICINE | Facility: CLINIC | Age: 58
End: 2023-08-16

## 2023-08-16 NOTE — TELEPHONE ENCOUNTER
Spoke with Marina [facility] Nurse received report of patient's blood pressure reading on today. Patient did not take any blodd pressure medication on today, but has a Clonidine 0.3mg patch still currently intact to her skin. Patient had not eaten breakfast or lunch when HHN arrived.  was in the process of preparing lunch. Called patient's home to check up on her to assess situation left messages for her & her  to return call to clinic.

## 2023-08-16 NOTE — OP NOTE
[facility] Surg   Operative Note    SUMMARY     Patient: Eva Bloom    Surgery Date: 7/21/2023     Surgeon(s) and Role:     * Obi Werner MD - Primary    Assisting Surgeon: None    Pre-op Diagnosis:  Arteriovenous graft infection, initial encounter [T82.7XXA]    Post-op Diagnosis:  Post-Op Diagnosis Codes:     * Arteriovenous graft infection, initial encounter [T82.7XXA]    Procedures Performed:      Procedure(s) (LRB):  LEFT ARM ARTERIOVENOUS GRAFT EXCISION  AND LIGATION (Left)    Anesthesia: Choice, regional block and MAC    Findings of the procedure:  exposed and infected AVG s/p resection.  No gross purulence noted.  No feasible options for placing a new AVG. Arterial and venous ends of graft were accessed (first) via separate incisions and appeared well incorporated without infection. The arterial and venous ends of AVG ligated with mattress 5-0 proline. Adjacent arterial and venous ends of the graft were resected separately and sent for culture separately from the mid portion of graft when was clearly infected.      Estimated Blood Loss: 100 mL         Specimens:   Graft margins from proximal distal and mid Graft were sent separately for culture.      Indications for Procedure:  vEa Bloom is a 57 y.o. female  w hx of ESRD and L AVG, presenting with L toe ulcer , dry chronic for one month.  On exam I noticed LUE AVG was exposed at site of skin breakdown at prior counter incision from a revision a few months ago.  Pt had previously prevented with sepsis due to a prior AVG infection and underwent excision and revision with an accuseal tunneled laterally in the arm.  This accuseal was now infected by definition as it was exposed to air.  Therefore we planned to ligate and excise this infected accuseal graft.   Since the graft was functioning, I would attempt placing a new accuseal graft if feasible at time of ligation.  However, Given multiple prior access revisions in left arm, I  counseled patient and her  on the likely need for ligation and excision only, in which case she would need a HD catheter placed.      Description of procedure:    The Patient was identified in the preoperative holding area. The patient was taken to the operating room and placed supine on the table. MAC Anesthesia was induced. The left arm was/were prepped and draped in the usual standard fashion.  A surgical pause was conducted confirming correct patient, correct site, correct pre-operative preparations.  Ioban sterile drape was applied.  Using a sterile ultrasound the AVG was examined and its anastomosis at arterial and venous ends were marked.  Based on the ultrasound there was no perigraft fluid and there was no overlying erythema therefore I was hopeful that the chest resect a portion of the graft that was infected and leave the presumably uninfected ends of the graft in place.  This would significantly educe the morbidity and complexity of the surgery.  Therefore in order to prevent seeding the retained graft material, I started by making separate small incisions at the planned proximal and distal margins of the graft in a clean presumably uninfected field, while keeping the infected portion of the field covered with IV.  A 2 cm incision was made perpendicular to the graft at its proximal and distal ends.  The graft was noted to be well incorporated these area and thus encouraging for it not being infected.    I considered the possibility of placing a new Accu signal ready to use graft in the arm and using the sites for anastomoses, however due to the patient's very petite anatomy and having no where in the arm to tunnel laterally without going directly posterior, and the risk of reinfection given the proximity to the current infected field, I did not feel that placing a new graft at this time was a viable option.  Therefore I decided to ligate the ends of the graft and 1st ligated the arterial end of  the graft.  A proximal vascular clamp was placed as proximal as possible in the arterial end of the graft, and likewise distal outflow vascular clamp was placed as distal possible in the venous end of the graft through the separate incision.  I transected the graft the proximal end and used a 5 0 Prolene to over-sew the proximal graft stump, using a horizontal mattress running fashion.  I sent a piece of the proximal margin of the resected graft for culture, therefore if it returned positive I would expect that the retained graft stump with also likely be infected.  I performed the analogous maneuver at the distal venous outflow end of the graft over showing stump with a continuous 5 0 Prolene horizontal mattress stich, and sent a distal margin of the graft for culture.  I closed these incisions using 2-0 Vicryl and 3-0 Vicryl and Monocryl, prior to turning my attention to the setting the midportion of the graft.    I made a longitudinal incision over the graft at infected portion  separately and between the other 2 incisions which I had just closed.  I proceeded to dissect out the graft which was notably not incorporated near the open wound with the graft was clearly affected, but did seem to be well incorporated toward the end of the incision at the proximal and distal ends graft.  I resected this portion of the graft its entirety.  I washed the wound with antibiotic irrigation, there was no purulence or obvious signs of infection noted.  I then closed the skin with nylon horizontal mattress.  This concluded the case.  All instrument counts were correct.  I was present for the entirety of the case.  The patient tolerated the procedure well and was brought to the postoperative recovery unit in stable condition.

## 2023-08-16 NOTE — TELEPHONE ENCOUNTER
----- Message from Arik Persaud MA sent at 8/16/2023  1:25 PM CDT -----  Type: Patient Call Back    Who called:Marina - [facility]     What is the request in detail:pt. Had a low blood pressure today 75/50.. 65 heart rate .. States the pt. Is tired but no dizziness .. She hasn't taken any blood pressure medication today.. legs were elevated for 15 mins and her pressure went up to 106/71.. 69 heart rate ..     Can the clinic reply by [facility]?No    Would the patient rather a call back or a response via [facility]? Yes, call     Best call back number: [phone]

## 2023-08-17 NOTE — OP NOTE
Date:  7/25/23    Surgeon: Obi Werner MD PhD    Assistant: none     Pre-op Diagnosis: PAD, chronic limb-threatening ischemia with left 2nd toe ulceration    Post-op Diagnosis: Same    Procedures:   1. Conscious sedation  2. Ultrasound-guided access to the left common femoral artery (antegrade).   3. Percutaneous closure of the above access site using 6 Algerian Mynx closure device   4. Left lower extremity diagnostic angiogram    5. Percutaneous transluminal angioplasty of the anterior tibial artery ostia with a 3 by 40 mm chocolate balloon  6. Percutaneous transluminal angioplasty of the entire length of the left posterior tibial artery with a 2 mm coyote balloon, and the distal posterior tibial artery with a 1.5 mm Ultraverse  7. Intravascular lithotripsy (shockwave) of the proximal posterior tibial artery with a 2.5 mm balloon.    EBL: Minimal    Anesthesia: Local, RN IV sedation    Findings:   Successful revascularization / resolution of stenosis; significant slow flow globally consistent with patients heart failure     Complications:  None; patient tolerated the procedure well.    Disposition: Recoery- hemodynamically stable in good condition    Attending Attestation: I was present and scrubbed for the entire procedure.  After an informed consent was obtained the patient was brought to the interventional suite and placed in the supine position. Both the patient and procedure were confirmed and identified during timeout process. The patient received perioperative antibiotics. The patient's bilateral groins were prepped and draped in usual sterile fashion. Using direct ultrasound guidance the left common femoral artery was accessed antegrade fashion, 21-G needle, Mandril wire and 4-Fr micropuncture sheath. Then under fluoroscopic guidance, an 0.035-in wire was placed into the distal aorta. The micropuncture sheath was then exchanged over the wire for a short 5-Algerian sheath.  Left leg runoff Angiography  was performed from the left groin to the 5 Sri Lankan sheath which demonstrated:   Common femoral, profunda femoral arteries: patent without significant stenosis  Superficial femoral artery: patent without significant stenosis  Popliteal artery: patent without significant stenosis  Tibials:  Primary runoff to foot supplied by AT with some mild stenosis at the takeoff, peroneal and posterior tibial artery patent but extremely diminutive.  S  Of note there was extremely slow flow globally consistent with the patient's known severe heart failure.  Based on the images from this diagnostic angio, a decision was made to intervene. We then systemically heparinized the patient , and heparin was re-dosed appropriately to maintain an ACT greater than 250. Next, we placed a 6 Sri [place]ankan 45 length sheath over a stiff glide.  O.14 Glidewire Advantage wire to selected the anterior tibial artery artery. Treatment of of a mildly stenotic proximal lesion of the AT at its takeoff artery was done with a 3 m chocolate balloon.  There was some wasting noted on inflating the balloon. A follow-up angiogram showed luminal gain at the lesion.  Although there was robust inline flow to the foot is anterior tibial artery, Given the presence of patient's ulcer, decision was made to intervene on the patient's posterior tibial artery in order to improve his extremely diminutive flow.  Using the 014 glidewire Advantage and a 014 seeker catheter the posterior tibial artery was selected in the Glidewire was advanced down into the foot, plantar.  We then proceeded to balloon the entire length of this stenosis extending of the proximal posterior tibial artery down to the ankle with a 1 point 5 mm all traversed balloon followed by a long 2 mm coyote balloon.  There remained tight stenoses in the proximal posterior tibial artery and therefore shockwave lithotripsy balloon (2.5x40 mm) was used to treat the stubborn highly calcified lesions.  There was  significant wasting which eventually expanded on multiple subsequent inflations.  Follow-up angiography demonstrated excellent luminal gain.  This concluded our case  Heparin was reversed with protamine. We then deployed a 6 Lithuanian Mynx closure device without issue to achieve hemostasis at our access site. At the conclusion of the case the patient was noted to have no evidence of a groin hematoma, and  pulse exam demonstrated:.  All instrument and sponge counts were correct at the end of the case. The patient tolerated the procedure well without complication, although was having significant difficulty keeping her leg still during the procedure, and was transferred to the pacu for further recovery.     MODERATE SEDATION IN ANGIOSUITE  I was present for moderate sedation and monitored the patients cardio respiratory functions during the procedure. See nurses notes for Intra-service start and end times and for the log of the medications, doseage and route.    Time of sedation:  140 mins.

## 2023-08-18 ENCOUNTER — TELEPHONE (OUTPATIENT)
Dept: VASCULAR SURGERY | Facility: CLINIC | Age: 58
End: 2023-08-18
Payer: MEDICARE

## 2023-08-18 DIAGNOSIS — Z01.818 PRE-OP EVALUATION: Primary | ICD-10-CM

## 2023-08-18 NOTE — TELEPHONE ENCOUNTER
Contacted pt and  in response to message. Appt details reviewed, pt confirmed.----- Message from Maria De Jesus Esteban sent at 8/18/2023 10:46 AM CDT -----  Regarding: Call back  Contact:  @ [phone]  Message is for Adriana, pt  is asking for a call back. I read message to him

## 2023-08-18 NOTE — TELEPHONE ENCOUNTER
Attempted to contact pt and  to confirm appts on Friday 9/1/23 and to notify both that ultrasound will be for RUE vein mapping. Voice message left for pt and  with this information and requesting return call for questions or concerns.----- Message from FARIDEH Muñoz III, MD sent at 8/16/2023  1:14 PM CDT -----  I think that surgery was done at the [facility].  It that's more convenient for her, she can f/u there wih Dr Werner    If she would rather f/u with me, that's fine; if so, yes on the R vein mapping  ----- Message -----  From: Adriana De La Rosa RN  Sent: 8/16/2023   9:25 AM CDT  To: FARIDEH Muñoz III, MD    Mrs. Bloom was supposed to see you in clinic this Friday but was moved to 9/1 since you're out. They thought they had an appt with you today and showed up to clinic, and while they were here they told me that Mrs. Bloom had her left arm AVG ligated and excised by Dr. Werner on 7/21 due to a fungal infection and a left leg angioplasty for a gangrenous toe. She now has a permcath in place. She has been seeing Dr. Farooq for wound care of the toe but doesn't have a FU scheduled with Dr. Werner and still has sutures in her arm. Do you want to see her in your clinic next week for FU or would you like to have her see Dr. Werner since he operated? Also, I'm gathering from his note that he excised only a portion of the graft but I could be wrong. If she sees you in clinic is there any reason to scan her left arm and do you want right arm vein mapping?

## 2023-08-21 LAB
FUNGUS SPEC CULT: ABNORMAL
FUNGUS SPEC CULT: NORMAL

## 2023-08-25 ENCOUNTER — HOSPITAL ENCOUNTER (OUTPATIENT)
Facility: HOSPITAL | Age: 58
Discharge: HOME | End: 2023-08-26
Attending: EMERGENCY MEDICINE | Admitting: EMERGENCY MEDICINE
Payer: MEDICARE

## 2023-08-25 DIAGNOSIS — N18.6 ANEMIA IN ESRD (END-STAGE RENAL DISEASE): Primary | ICD-10-CM

## 2023-08-25 DIAGNOSIS — D63.1 ANEMIA IN ESRD (END-STAGE RENAL DISEASE): Primary | ICD-10-CM

## 2023-08-25 DIAGNOSIS — R06.02 SHORTNESS OF BREATH: ICD-10-CM

## 2023-08-25 DIAGNOSIS — I50.43 ACUTE ON CHRONIC COMBINED SYSTOLIC AND DIASTOLIC HEART FAILURE: ICD-10-CM

## 2023-08-25 PROBLEM — I42.8 CARDIOMYOPATHY, NONISCHEMIC: Chronic | Status: ACTIVE | Noted: 2017-07-21

## 2023-08-25 PROBLEM — I48.0 PAF (PAROXYSMAL ATRIAL FIBRILLATION): Chronic | Status: ACTIVE | Noted: 2022-07-25

## 2023-08-25 PROBLEM — R04.0 RIGHT-SIDED EPISTAXIS: Status: RESOLVED | Noted: 2021-10-22 | Resolved: 2023-08-25

## 2023-08-25 PROBLEM — R04.0 LEFT-SIDED EPISTAXIS: Status: RESOLVED | Noted: 2021-10-22 | Resolved: 2023-08-25

## 2023-08-25 PROBLEM — A49.8 CLOSTRIDIUM DIFFICILE INFECTION: Status: RESOLVED | Noted: 2022-12-20 | Resolved: 2023-08-25

## 2023-08-25 PROBLEM — E03.9 HYPOTHYROIDISM: Chronic | Status: ACTIVE | Noted: 2023-07-19

## 2023-08-25 LAB
ABO + RH BLD: NORMAL
ALBUMIN SERPL BCP-MCNC: 2.6 G/DL (ref 3.5–5.2)
ALP SERPL-CCNC: 334 U/L (ref 55–135)
ALT SERPL W/O P-5'-P-CCNC: 36 U/L (ref 10–44)
ANION GAP SERPL CALC-SCNC: 16 MMOL/L (ref 8–16)
AST SERPL-CCNC: 41 U/L (ref 10–40)
BASOPHILS # BLD AUTO: 0.08 K/UL (ref 0–0.2)
BASOPHILS NFR BLD: 2 % (ref 0–1.9)
BILIRUB SERPL-MCNC: 0.7 MG/DL (ref 0.1–1)
BLD GP AB SCN CELLS X3 SERPL QL: NORMAL
BNP SERPL-MCNC: >4900 PG/ML (ref 0–99)
BUN SERPL-MCNC: 37 MG/DL (ref 6–20)
CALCIUM SERPL-MCNC: 9 MG/DL (ref 8.7–10.5)
CHLORIDE SERPL-SCNC: 107 MMOL/L (ref 95–110)
CO2 SERPL-SCNC: 21 MMOL/L (ref 23–29)
CREAT SERPL-MCNC: 4.5 MG/DL (ref 0.5–1.4)
CTP QC/QA: YES
DIFFERENTIAL METHOD: ABNORMAL
EOSINOPHIL # BLD AUTO: 0.2 K/UL (ref 0–0.5)
EOSINOPHIL NFR BLD: 5.6 % (ref 0–8)
ERYTHROCYTE [DISTWIDTH] IN BLOOD BY AUTOMATED COUNT: 20.7 % (ref 11.5–14.5)
EST. GFR  (NO RACE VARIABLE): 11 ML/MIN/1.73 M^2
GLUCOSE SERPL-MCNC: 71 MG/DL (ref 70–110)
HCT VFR BLD AUTO: 35.1 % (ref 37–48.5)
HGB BLD-MCNC: 10.6 G/DL (ref 12–16)
IMM GRANULOCYTES # BLD AUTO: 0.01 K/UL (ref 0–0.04)
IMM GRANULOCYTES NFR BLD AUTO: 0.3 % (ref 0–0.5)
LACTATE SERPL-SCNC: 3.1 MMOL/L (ref 0.5–2.2)
LACTATE SERPL-SCNC: 4.3 MMOL/L (ref 0.5–2.2)
LYMPHOCYTES # BLD AUTO: 1.6 K/UL (ref 1–4.8)
LYMPHOCYTES NFR BLD: 41.3 % (ref 18–48)
MAGNESIUM SERPL-MCNC: 2.7 MG/DL (ref 1.6–2.6)
MCH RBC QN AUTO: 34 PG (ref 27–31)
MCHC RBC AUTO-ENTMCNC: 30.2 G/DL (ref 32–36)
MCV RBC AUTO: 113 FL (ref 82–98)
MONOCYTES # BLD AUTO: 0.4 K/UL (ref 0.3–1)
MONOCYTES NFR BLD: 10.6 % (ref 4–15)
NEUTROPHILS # BLD AUTO: 1.6 K/UL (ref 1.8–7.7)
NEUTROPHILS NFR BLD: 40.2 % (ref 38–73)
NRBC BLD-RTO: 3 /100 WBC
PLATELET # BLD AUTO: 118 K/UL (ref 150–450)
PMV BLD AUTO: 11.3 FL (ref 9.2–12.9)
POTASSIUM SERPL-SCNC: 3.7 MMOL/L (ref 3.5–5.1)
PROT SERPL-MCNC: 6.7 G/DL (ref 6–8.4)
RBC # BLD AUTO: 3.12 M/UL (ref 4–5.4)
SARS-COV-2 RDRP RESP QL NAA+PROBE: NEGATIVE
SODIUM SERPL-SCNC: 144 MMOL/L (ref 136–145)
SPECIMEN OUTDATE: NORMAL
TROPONIN I SERPL DL<=0.01 NG/ML-MCNC: 0.08 NG/ML (ref 0–0.03)
WBC # BLD AUTO: 3.95 K/UL (ref 3.9–12.7)

## 2023-08-25 PROCEDURE — 83605 ASSAY OF LACTIC ACID: CPT | Mod: 91 | Performed by: HOSPITALIST

## 2023-08-25 PROCEDURE — 86900 BLOOD TYPING SEROLOGIC ABO: CPT | Performed by: EMERGENCY MEDICINE

## 2023-08-25 PROCEDURE — 63600175 PHARM REV CODE 636 W HCPCS: Performed by: INTERNAL MEDICINE

## 2023-08-25 PROCEDURE — 87040 BLOOD CULTURE FOR BACTERIA: CPT | Performed by: HOSPITALIST

## 2023-08-25 PROCEDURE — 85025 COMPLETE CBC W/AUTO DIFF WBC: CPT | Performed by: EMERGENCY MEDICINE

## 2023-08-25 PROCEDURE — 87635 SARS-COV-2 COVID-19 AMP PRB: CPT | Performed by: EMERGENCY MEDICINE

## 2023-08-25 PROCEDURE — 83880 ASSAY OF NATRIURETIC PEPTIDE: CPT | Performed by: EMERGENCY MEDICINE

## 2023-08-25 PROCEDURE — 83036 HEMOGLOBIN GLYCOSYLATED A1C: CPT | Performed by: HOSPITALIST

## 2023-08-25 PROCEDURE — 93005 ELECTROCARDIOGRAM TRACING: CPT

## 2023-08-25 PROCEDURE — 80053 COMPREHEN METABOLIC PANEL: CPT | Performed by: EMERGENCY MEDICINE

## 2023-08-25 PROCEDURE — 80100016 HC MAINTENANCE HEMODIALYSIS

## 2023-08-25 PROCEDURE — 83605 ASSAY OF LACTIC ACID: CPT | Performed by: EMERGENCY MEDICINE

## 2023-08-25 PROCEDURE — 99285 EMERGENCY DEPT VISIT HI MDM: CPT | Mod: 25

## 2023-08-25 PROCEDURE — G0378 HOSPITAL OBSERVATION PER HR: HCPCS

## 2023-08-25 PROCEDURE — 84484 ASSAY OF TROPONIN QUANT: CPT | Performed by: EMERGENCY MEDICINE

## 2023-08-25 PROCEDURE — 96372 THER/PROPH/DIAG INJ SC/IM: CPT | Mod: 59 | Performed by: INTERNAL MEDICINE

## 2023-08-25 PROCEDURE — 93010 EKG 12-LEAD: ICD-10-PCS | Mod: ,,, | Performed by: INTERNAL MEDICINE

## 2023-08-25 PROCEDURE — 93010 ELECTROCARDIOGRAM REPORT: CPT | Mod: ,,, | Performed by: INTERNAL MEDICINE

## 2023-08-25 PROCEDURE — G0257 UNSCHED DIALYSIS ESRD PT HOS: HCPCS

## 2023-08-25 PROCEDURE — 83735 ASSAY OF MAGNESIUM: CPT | Performed by: HOSPITALIST

## 2023-08-25 PROCEDURE — 25000003 PHARM REV CODE 250: Performed by: HOSPITALIST

## 2023-08-25 PROCEDURE — 36415 COLL VENOUS BLD VENIPUNCTURE: CPT | Performed by: HOSPITALIST

## 2023-08-25 RX ORDER — HYDRALAZINE HYDROCHLORIDE 25 MG/1
50 TABLET, FILM COATED ORAL EVERY 12 HOURS
Status: DISCONTINUED | OUTPATIENT
Start: 2023-08-25 | End: 2023-08-26 | Stop reason: HOSPADM

## 2023-08-25 RX ORDER — METOPROLOL SUCCINATE 50 MG/1
50 TABLET, EXTENDED RELEASE ORAL DAILY
Status: DISCONTINUED | OUTPATIENT
Start: 2023-08-25 | End: 2023-08-26 | Stop reason: HOSPADM

## 2023-08-25 RX ORDER — ONDANSETRON 2 MG/ML
4 INJECTION INTRAMUSCULAR; INTRAVENOUS EVERY 8 HOURS PRN
Status: DISCONTINUED | OUTPATIENT
Start: 2023-08-25 | End: 2023-08-26 | Stop reason: HOSPADM

## 2023-08-25 RX ORDER — HYDRALAZINE HYDROCHLORIDE 25 MG/1
100 TABLET, FILM COATED ORAL EVERY 12 HOURS
Status: DISCONTINUED | OUTPATIENT
Start: 2023-08-25 | End: 2023-08-25

## 2023-08-25 RX ORDER — SODIUM CHLORIDE 0.9 % (FLUSH) 0.9 %
10 SYRINGE (ML) INJECTION
Status: DISCONTINUED | OUTPATIENT
Start: 2023-08-25 | End: 2023-08-26 | Stop reason: HOSPADM

## 2023-08-25 RX ORDER — ACETAMINOPHEN 325 MG/1
650 TABLET ORAL EVERY 6 HOURS PRN
Status: DISCONTINUED | OUTPATIENT
Start: 2023-08-25 | End: 2023-08-26 | Stop reason: HOSPADM

## 2023-08-25 RX ORDER — LEVOTHYROXINE SODIUM 75 UG/1
75 TABLET ORAL
Status: DISCONTINUED | OUTPATIENT
Start: 2023-08-25 | End: 2023-08-26 | Stop reason: HOSPADM

## 2023-08-25 RX ORDER — FUROSEMIDE 10 MG/ML
40 INJECTION INTRAMUSCULAR; INTRAVENOUS 2 TIMES DAILY WITH MEALS
Status: DISCONTINUED | OUTPATIENT
Start: 2023-08-26 | End: 2023-08-25

## 2023-08-25 RX ORDER — NAPROXEN SODIUM 220 MG/1
81 TABLET, FILM COATED ORAL DAILY
Status: DISCONTINUED | OUTPATIENT
Start: 2023-08-25 | End: 2023-08-26 | Stop reason: HOSPADM

## 2023-08-25 RX ORDER — POLYETHYLENE GLYCOL 3350 17 G/17G
17 POWDER, FOR SOLUTION ORAL DAILY
Status: DISCONTINUED | OUTPATIENT
Start: 2023-08-25 | End: 2023-08-26 | Stop reason: HOSPADM

## 2023-08-25 RX ORDER — TALC
6 POWDER (GRAM) TOPICAL NIGHTLY PRN
Status: DISCONTINUED | OUTPATIENT
Start: 2023-08-25 | End: 2023-08-26 | Stop reason: HOSPADM

## 2023-08-25 RX ADMIN — METOPROLOL SUCCINATE 50 MG: 50 TABLET, EXTENDED RELEASE ORAL at 04:08

## 2023-08-25 RX ADMIN — ERYTHROPOIETIN 4000 UNITS: 10000 INJECTION, SOLUTION INTRAVENOUS; SUBCUTANEOUS at 01:08

## 2023-08-25 RX ADMIN — LEVOTHYROXINE SODIUM 75 MCG: 75 TABLET ORAL at 04:08

## 2023-08-25 RX ADMIN — ASPIRIN 81 MG CHEWABLE TABLET 81 MG: 81 TABLET CHEWABLE at 04:08

## 2023-08-25 NOTE — H&P
[facility] Medicine  History & Physical    Patient Name: Eva Bloom  MRN: [id number]  Patient Class: OP- Observation  Admission Date: 8/25/2023  Attending Physician: Danilo Olivier MD   Primary Care Provider: Sowmya Mccain MD         Patient information was obtained from patient, spouse/SO, past medical records and ER records.     Subjective:     Principal Problem:Acute on chronic combined systolic and diastolic heart failure    Chief Complaint:   Chief Complaint   Patient presents with    Shortness of Breath     Patient with increased SOB denies CP, states started today denies cough, congestion or fever, dialysis on T, TH . Sat        HPI: 57 y.o. female with PMHx of CHF, ESRD on HD T/Th/Sat, nonischemic cardiomyopathy, HTN, PAF, and hypothyroidism presents with a complaint of shortness of breath.  SOB started this AM while pt was at rest on her way to an appointment. States the SOB comes and goes. Reports going to dialysis yesterday. Denies ever missing dialysis and reports medication compliance. Pt's  is the caregiver and is at bedside with pt.  Denies any chest pain, syncope, dizziness. Echo May 2023 shows EF 20-25%, grade II DD. In the ED, BNP elevated, evidence of pulmonary edema on chest imaging.  Nephrology consulted for HD.  Placed in observation.      Past Medical History:   Diagnosis Date    Anemia     Atrial fibrillation     Bronchitis 03/01/2017    Cancer 2016    kidney cancer    CHF (congestive heart failure), NYHA class II, chronic, systolic     CMV (cytomegalovirus) antibody positive     Encounter for blood transfusion     ESRD (end stage renal disease)     Essential hypertension 09/23/2015    H/O herpes simplex type 2 infection     Herpes simplex type 1 antibody positive     History of kidney cancer     s/p left nephrectomy 1/2016    Hyperparathyroidism, unspecified     Hyperuricemia without signs of inflammatory arthritis and tophaceous disease      Kidney stones     LGSIL (low grade squamous intraepithelial dysplasia)     Myocardiopathy 07/21/2017    Prediabetes     Proteinuria     Renal disorder     Thyroid nodule     Urate nephropathy        Past Surgical History:   Procedure Laterality Date    BREAST CYST EXCISION      COLONOSCOPY N/A 11/12/2015    COLONOSCOPY N/A 3/12/2021    Procedure: COLONOSCOPY;  Surgeon: Brendon Lanier MD;  Location: [facility] ENDO;  Service: Endoscopy;  Laterality: N/A;  covid test 3/9, labs prior, prep instr mailed -ml    INSERTION OF BIVENTRICULAR IMPLANTABLE CARDIOVERTER-DEFIBRILLATOR (ICD)  04/2021    INSERTION OF TUNNELED CENTRAL VENOUS CATHETER (CVC) WITH SUBCUTANEOUS PORT N/A 1/25/2023    Procedure: INSERTION, DUAL LUMEN CATHETER WITH PORT, WITH IMAGING GUIDANCE;  Surgeon: FARIDEH Muñoz III, MD;  [facility];  Service: Peripheral Vascular;  Laterality: N/A;  possinble permcath placment    NEPHRECTOMY-LAPAROSCOPIC Left 01/12/2016    PERCUTANEOUS TRANSLUMINAL ANGIOPLASTY OF ARTERIOVENOUS FISTULA Left 4/19/2023    Procedure: PTA, AV FISTULA;  Surgeon: FARIDEH Muñoz III, MD;  Location: [facility] CATH LAB;  Service: Peripheral Vascular;  Laterality: Left;    PERITONEAL CATHETER INSERTION      Permacath insertion  01/12/2017    PLACEMENT OF ARTERIOVENOUS GRAFT  12/28/2022    Procedure: INSERTION, GRAFT, ARTERIOVENOUS;  Surgeon: FARIDEH Muñoz III, MD;  [facility];  Service: Peripheral Vascular;;    REMOVAL, GRAFT, ARTERIOVENOUS, UPPER EXTREMITY Left 1/25/2023    Procedure: REMOVAL, GRAFT, ARTERIOVENOUS, UPPER EXTREMITY;  Surgeon: FARIDEH Muñoz III, MD;  Location: [facility] OR [facility] FLR;  Service: Peripheral Vascular;  Laterality: Left;    REVISION OF ARTERIOVENOUS FISTULA Left 5/1/2023    Procedure: REVISION, AV FISTULA;  Surgeon: FARIDEH Muñoz III, MD;  Location: [facility] OR 2ND FLR;  Service: Peripheral Vascular;  Laterality: Left;  LUE AVG revision    REVISION OF PROCEDURE INVOLVING  ARTERIOVENOUS GRAFT Left 12/28/2022    Procedure: REVISION, PROCEDURE INVOLVING ARTERIOVENOUS GRAFT;  Surgeon: FARIDEH Muñoz III, MD;  [facility];  Service: Peripheral Vascular;  Laterality: Left;    REVISION OF PROCEDURE INVOLVING ARTERIOVENOUS GRAFT Left 7/21/2023    Procedure: LEFT ARM ARTERIOVENOUS GRAFT EXCISION  AND LIGATION;  Surgeon: Obi Werner MD;  Location: [facility] OR;  Service: Vascular;  Laterality: Left;  Left AVG excision and revision with interposition    SALPINGOOPHORECTOMY Right 2016    KJB---DAVINCI    TONSILLECTOMY      TUBAL LIGATION         Review of patient's allergies indicates:   Allergen Reactions    Coreg [carvedilol] Other (See Comments)     Nausea/vomiting    Allopurinol      Other reaction(s): abnormal transaminases       Current Facility-Administered Medications on File Prior to Encounter   Medication    0.9%  NaCl infusion     Current Outpatient Medications on File Prior to Encounter   Medication Sig    apixaban (ELIQUIS) 2.5 mg Tab Take 1 tablet (2.5 mg total) by mouth 2 (two) times daily.    aspirin 81 MG Chew Take 1 tablet (81 mg total) by mouth once daily.    cabozantinib (CABOMETYX) 60 mg Tab TAKE ONE TABLET BY MOUTH ONCE DAILY AT THE SAME TIME ON AN EMPTY STOMACH AT LEAST 1 HOUR BEFORE OR 2 HOURS AFTER EATING. AVOID GRAPEFRUIT PRODUCTS    cloNIDine 0.3 mg/24 hr td ptwk (CATAPRES) 0.3 mg/24 hr Place 1 patch onto the skin every 7 days.    hydrALAZINE (APRESOLINE) 100 MG tablet Take 1 tablet (100 mg total) by mouth every 12 (twelve) hours.    HYDROcodone-acetaminophen (NORCO) 5-325 mg per tablet Take 1 tablet by mouth every 6 (six) hours as needed for Pain.    levothyroxine (SYNTHROID) 75 MCG tablet Take 1 tablet (75 mcg total) by mouth once daily.    metoprolol succinate (TOPROL-XL) 100 MG 24 hr tablet Take 1/2 tablet (50mg) once daily    mv,Ca,min-folic acid-vit K1 (ONE-A-DAY WOMEN'S 50 PLUS) 400-20 mcg Tab Take 1 tablet by mouth once  daily.    naloxone (NARCAN) 1 mg/mL injection 2 mg (1 mg per nostril) by Nasal route as needed for opioid overdose; may repeat in 3 to 5 minutes if not effective. Call 911    sacubitriL-valsartan (ENTRESTO) 24-26 mg per tablet Take 1 tablet by mouth 2 (two) times daily.    acetaminophen (TYLENOL) 500 MG tablet Take 500 mg by mouth every 6 (six) hours as needed for Pain.    cyclobenzaprine (FLEXERIL) 5 MG tablet Take 1 tablet (5 mg total) by mouth daily as needed for Muscle spasms. (Patient not taking: Reported on 8/25/2023)    lidocaine-prilocaine (EMLA) cream APPLY ATLEAST 30 MINUTES BEFORE TREATMENT 3 TIMES A WEEK    [DISCONTINUED] amLODIPine (NORVASC) 5 MG tablet TAKE 1 TABLET BY MOUTH EVERY DAY    [DISCONTINUED] gabapentin (NEURONTIN) 100 MG capsule Take 1 capsule (100 mg total) by mouth once daily.    [DISCONTINUED] polyethylene glycol (GLYCOLAX) 17 gram/dose powder Take 17 g by mouth once daily.     Family History       Problem Relation (Age of Onset)    Cataracts Maternal Aunt    Diabetes Father, Maternal Grandfather    Heart disease Sister    Hypertension Mother    Kidney disease Father    No Known Problems Sister, Sister, Brother, Brother, Maternal Uncle, Paternal Aunt, Paternal Uncle, Maternal Grandmother, Paternal Grandmother, Paternal Grandfather, Son, Son, Other          Tobacco Use    Smoking status: Never     Passive exposure: Never    Smokeless tobacco: Never   Substance and Sexual Activity    Alcohol use: No    Drug use: Yes     Types: Hydrocodone    Sexual activity: Not Currently     Review of Systems   Constitutional:  Positive for fatigue. Negative for chills and fever.   HENT:  Negative for congestion and rhinorrhea.    Eyes:  Negative for photophobia and visual disturbance.   Respiratory:  Positive for shortness of breath. Negative for cough.    Cardiovascular:  Positive for leg swelling. Negative for chest pain and palpitations.   Gastrointestinal:  Negative for abdominal pain,  diarrhea, nausea and vomiting.   Genitourinary:  Negative for dysuria, frequency and urgency.   Skin:  Positive for wound. Negative for pallor and rash.   Neurological:  Negative for light-headedness and headaches.   Psychiatric/Behavioral:  Negative for confusion and decreased concentration.      Objective:     Vital Signs (Most Recent):  Temp: 98.3 °F (36.8 °C) (08/25/23 1253)  Pulse: 71 (08/25/23 1239)  Resp: 19 (08/25/23 1248)  BP: (!) 141/88 (08/25/23 1239)  SpO2: 99 % (08/25/23 1239) Vital Signs (24h Range):  Temp:  [98.3 °F (36.8 °C)] 98.3 °F (36.8 °C)  Pulse:  [71-72] 71  Resp:  [11-24] 19  SpO2:  [98 %-100 %] 99 %  BP: (137-141)/(88-91) 141/88     Weight: 43.1 kg (95 lb)  Body mass index is 16.31 kg/m².     Physical Exam  Vitals and nursing note reviewed.   Constitutional:       General: She is not in acute distress.     Appearance: She is well-developed.   HENT:      Head: Normocephalic and atraumatic.      Right Ear: External ear normal.      Left Ear: External ear normal.      Nose: Nose normal.   Eyes:      Conjunctiva/sclera: Conjunctivae normal.   Cardiovascular:      Rate and Rhythm: Normal rate and regular rhythm.   Pulmonary:      Effort: Pulmonary effort is normal. No respiratory distress.   Abdominal:      General: There is no distension.      Palpations: Abdomen is soft.   Musculoskeletal:         General: Normal range of motion.      Right lower leg: Edema present.      Left lower leg: Edema present.   Skin:     General: Skin is warm and dry.   Neurological:      Mental Status: She is alert and oriented to person, place, and time.   Psychiatric:         Thought Content: Thought content normal.                Significant Labs: All pertinent labs within the past 24 hours have been reviewed.    Significant Imaging: I have reviewed all pertinent imaging results/findings within the past 24 hours.    Assessment/Plan:     * Acute on chronic combined systolic and diastolic heart failure  -Plan for HD  today   -Continue home entresto, metoprolol, hydralazine, and aspirin   -Continuous cardiac monitor   -1.5 L fluid restriction; daily weights   -SBP goal <160  -Cardiac/renal diet     ESRD (end stage renal disease) on dialysis  Nephrology consult    Cardiomyopathy, nonischemic  As above    Hypothyroidism  Continue home regimen of synthroid    PAF (paroxysmal atrial fibrillation)  Patient with Paroxysmal (<7 days) atrial fibrillation which is controlled currently with Beta Blocker. Patient is currently in sinus rhythm.[id number] Score: 2.  Anticoagulation indicated. Anticoagulation done with apixaban.    Anemia in ESRD (end-stage renal disease)  Patient H/H stable and consistent with baseline. No evidence acute bleeding or indication for transfusion at this time.      Essential hypertension  Well controlled, continue home medications and monitor blood pressure, adjust as needed.       VTE Risk Mitigation (From admission, onward)         Ordered     apixaban tablet 2.5 mg  2 times daily         08/25/23 1435     IP VTE HIGH RISK PATIENT  Once         08/25/23 1435     Place sequential compression device  Until discontinued         08/25/23 1435                     On 08/25/2023, patient should be placed in hospital observation services under my care in collaboration with Danilo Olivier MD.    Brice Mccray Jr., APRN, [facility]  Hospitalist - Department of Hospital Medicine  [facility] [address] [place] [id number]  Office #: [phone]; Pager #: [phone]

## 2023-08-25 NOTE — NURSING
Patient arrived to floor from ED, NAD noted. Safety Precautions maintained, Will Monitor. Family member at bedside.

## 2023-08-25 NOTE — HPI
57 y.o. female with PMHx of CHF, ESRD on HD T/Th/Sat, nonischemic cardiomyopathy, HTN, PAF, and hypothyroidism presents with a complaint of shortness of breath.  SOB started this AM while pt was at rest on her way to an appointment. States the SOB comes and goes. Reports going to dialysis yesterday. Denies ever missing dialysis and reports medication compliance. Pt's  is the caregiver and is at bedside with pt.  Denies any chest pain, syncope, dizziness. Echo May 2023 shows EF 20-25%, grade II DD. In the ED, BNP elevated, evidence of pulmonary edema on chest imaging.  Nephrology consulted for HD.  Placed in observation.

## 2023-08-25 NOTE — CONSULTS
Renal Consult    Date of Admission:  8/25/2023 10:12 AM        Chief Complaint:   Chief Complaint   Patient presents with    Shortness of Breath     Patient with increased SOB denies CP, states started today denies cough, congestion or fever, dialysis on T, TH . Sat       HPI: 56 y/o female with a PMHx. Of ESRD on HD (TTS) chronic combined HF, CMP s/p Defibrillator, A-fib. RCCA s/p L-nephrectomy, s/p Graft excision and ligation (7/21/23) who presents to the ED with shortness of breath that began this morning. Patient did go to dialysis yesterday.    PMH:  Past Medical History:   Diagnosis Date    Anemia     Atrial fibrillation     Bronchitis 03/01/2017    Cancer 2016    kidney cancer    CHF (congestive heart failure), NYHA class II, chronic, systolic     CMV (cytomegalovirus) antibody positive     Encounter for blood transfusion     ESRD (end stage renal disease)     Essential hypertension 09/23/2015    H/O herpes simplex type 2 infection     Herpes simplex type 1 antibody positive     History of kidney cancer     s/p left nephrectomy 1/2016    Hyperparathyroidism, unspecified     Hyperuricemia without signs of inflammatory arthritis and tophaceous disease     Kidney stones     LGSIL (low grade squamous intraepithelial dysplasia)     Myocardiopathy 07/21/2017    Prediabetes     Proteinuria     Renal disorder     Thyroid nodule     Urate nephropathy        PSH:  Past Surgical History:   Procedure Laterality Date    BREAST CYST EXCISION      COLONOSCOPY N/A 11/12/2015    COLONOSCOPY N/A 3/12/2021    Procedure: COLONOSCOPY;  Surgeon: Brendon Lanier MD;  Location: [facility];  Service: Endoscopy;  Laterality: N/A;  covid test 3/9, labs prior, prep instr mailed -ml    INSERTION OF BIVENTRICULAR IMPLANTABLE CARDIOVERTER-DEFIBRILLATOR (ICD)  04/2021    INSERTION OF TUNNELED CENTRAL VENOUS CATHETER (CVC) WITH SUBCUTANEOUS PORT N/A 1/25/2023    Procedure: INSERTION, DUAL LUMEN  CATHETER WITH PORT, WITH IMAGING GUIDANCE;  Surgeon: FARIDEH Muñoz III, MD;  Location: [facility] OR [facility];  Service: Peripheral Vascular;  Laterality: N/A;  possinble permcath placment    NEPHRECTOMY-LAPAROSCOPIC Left 01/12/2016    PERCUTANEOUS TRANSLUMINAL ANGIOPLASTY OF ARTERIOVENOUS FISTULA Left 4/19/2023    Procedure: PTA, AV FISTULA;  Surgeon: FARIDEH Muñoz III, MD;  Location: [facility] CATH LAB;  Service: Peripheral Vascular;  Laterality: Left;    PERITONEAL CATHETER INSERTION      Permacath insertion  01/12/2017    PLACEMENT OF ARTERIOVENOUS GRAFT  12/28/2022    Procedure: INSERTION, GRAFT, ARTERIOVENOUS;  Surgeon: FARIDEH Muñoz III, MD;  Location: [facility] OR [facility];  Service: Peripheral Vascular;;    REMOVAL, GRAFT, ARTERIOVENOUS, UPPER EXTREMITY Left 1/25/2023    Procedure: REMOVAL, GRAFT, ARTERIOVENOUS, UPPER EXTREMITY;  Surgeon: FARIDEH Muñoz III, MD;  [facility];  Service: Peripheral Vascular;  Laterality: Left;    REVISION OF ARTERIOVENOUS FISTULA Left 5/1/2023    Procedure: REVISION, AV FISTULA;  Surgeon: FARIDEH Muñoz III, MD;  [facility];  Service: Peripheral Vascular;  Laterality: Left;  LUE AVG revision    REVISION OF PROCEDURE INVOLVING ARTERIOVENOUS GRAFT Left 12/28/2022    Procedure: REVISION, PROCEDURE INVOLVING ARTERIOVENOUS GRAFT;  Surgeon: FARIDEH Muñoz III, MD;  [facility];  Service: Peripheral Vascular;  Laterality: Left;    REVISION OF PROCEDURE INVOLVING ARTERIOVENOUS GRAFT Left 7/21/2023    Procedure: LEFT ARM ARTERIOVENOUS GRAFT EXCISION  AND LIGATION;  Surgeon: Obi Werner MD;  Location: [facility];  Service: Vascular;  Laterality: Left;  Left AVG excision and revision with interposition    SALPINGOOPHORECTOMY Right 2016    KJB---DAVINCI    TONSILLECTOMY      TUBAL LIGATION         Allergies:  Review of patient's allergies indicates:   Allergen Reactions    Coreg [carvedilol] Other (See Comments)     Nausea/vomiting     Allopurinol      Other reaction(s): abnormal transaminases       Current Facility-Administered Medications on File Prior to Encounter   Medication Dose Route Frequency Provider Last Rate Last Admin    0.9%  NaCl infusion   Intravenous Continuous Soni Bhatti MD   New Bag at 07/21/23 1116     Current Outpatient Medications on File Prior to Encounter   Medication Sig Dispense Refill    acetaminophen (TYLENOL) 500 MG tablet Take 500 mg by mouth every 6 (six) hours as needed for Pain.      apixaban (ELIQUIS) 2.5 mg Tab Take 1 tablet (2.5 mg total) by mouth 2 (two) times daily. 60 tablet 11    aspirin 81 MG Chew Take 1 tablet (81 mg total) by mouth once daily. 30 tablet 11    cabozantinib (CABOMETYX) 60 mg Tab TAKE ONE TABLET BY MOUTH ONCE DAILY AT THE SAME TIME ON AN EMPTY STOMACH AT LEAST 1 HOUR BEFORE OR 2 HOURS AFTER EATING. AVOID GRAPEFRUIT PRODUCTS 30 tablet 4    cloNIDine 0.3 mg/24 hr td ptwk (CATAPRES) 0.3 mg/24 hr Place 1 patch onto the skin every 7 days. 4 patch 11    cyclobenzaprine (FLEXERIL) 5 MG tablet Take 1 tablet (5 mg total) by mouth daily as needed for Muscle spasms. 30 tablet 1    gabapentin (NEURONTIN) 100 MG capsule Take 1 capsule (100 mg total) by mouth once daily. 30 capsule 11    hydrALAZINE (APRESOLINE) 100 MG tablet Take 1 tablet (100 mg total) by mouth every 12 (twelve) hours. 180 tablet 2    HYDROcodone-acetaminophen (NORCO) 5-325 mg per tablet Take 1 tablet by mouth every 6 (six) hours as needed for Pain. 10 tablet 0    levothyroxine (SYNTHROID) 75 MCG tablet Take 1 tablet (75 mcg total) by mouth once daily. 30 tablet 11    lidocaine-prilocaine (EMLA) cream APPLY ATLEAST 30 MINUTES BEFORE TREATMENT 3 TIMES A WEEK 30 g 11    metoprolol succinate (TOPROL-XL) 100 MG 24 hr tablet Take 1/2 tablet (50mg) once daily 90 tablet 3    mv,Ca,min-folic acid-vit K1 (ONE-A-DAY WOMEN'S 50 PLUS) 400-20 mcg Tab Take 1 tablet by mouth once daily.      naloxone (NARCAN) 1 mg/mL injection 2 mg (1 mg per  nostril) by Nasal route as needed for opioid overdose; may repeat in 3 to 5 minutes if not effective. Call 911 2 mL 11    polyethylene glycol (GLYCOLAX) 17 gram/dose powder Take 17 g by mouth once daily. 235 g 0    sacubitriL-valsartan (ENTRESTO) 24-26 mg per tablet Take 1 tablet by mouth 2 (two) times daily. 60 tablet 3    [DISCONTINUED] amLODIPine (NORVASC) 5 MG tablet TAKE 1 TABLET BY MOUTH EVERY DAY 90 tablet 3       Medications:  No current facility-administered medications for this encounter.     Current Outpatient Medications   Medication Sig Dispense Refill    acetaminophen (TYLENOL) 500 MG tablet Take 500 mg by mouth every 6 (six) hours as needed for Pain.      apixaban (ELIQUIS) 2.5 mg Tab Take 1 tablet (2.5 mg total) by mouth 2 (two) times daily. 60 tablet 11    aspirin 81 MG Chew Take 1 tablet (81 mg total) by mouth once daily. 30 tablet 11    cabozantinib (CABOMETYX) 60 mg Tab TAKE ONE TABLET BY MOUTH ONCE DAILY AT THE SAME TIME ON AN EMPTY STOMACH AT LEAST 1 HOUR BEFORE OR 2 HOURS AFTER EATING. AVOID GRAPEFRUIT PRODUCTS 30 tablet 4    cloNIDine 0.3 mg/24 hr td ptwk (CATAPRES) 0.3 mg/24 hr Place 1 patch onto the skin every 7 days. 4 patch 11    cyclobenzaprine (FLEXERIL) 5 MG tablet Take 1 tablet (5 mg total) by mouth daily as needed for Muscle spasms. 30 tablet 1    gabapentin (NEURONTIN) 100 MG capsule Take 1 capsule (100 mg total) by mouth once daily. 30 capsule 11    hydrALAZINE (APRESOLINE) 100 MG tablet Take 1 tablet (100 mg total) by mouth every 12 (twelve) hours. 180 tablet 2    HYDROcodone-acetaminophen (NORCO) 5-325 mg per tablet Take 1 tablet by mouth every 6 (six) hours as needed for Pain. 10 tablet 0    levothyroxine (SYNTHROID) 75 MCG tablet Take 1 tablet (75 mcg total) by mouth once daily. 30 tablet 11    lidocaine-prilocaine (EMLA) cream APPLY ATLEAST 30 MINUTES BEFORE TREATMENT 3 TIMES A WEEK 30 g 11    metoprolol succinate (TOPROL-XL) 100 MG 24 hr tablet Take 1/2 tablet (50mg) once  daily 90 tablet 3    mv,Ca,min-folic acid-vit K1 (ONE-A-DAY WOMEN'S 50 PLUS) 400-20 mcg Tab Take 1 tablet by mouth once daily.      naloxone (NARCAN) 1 mg/mL injection 2 mg (1 mg per nostril) by Nasal route as needed for opioid overdose; may repeat in 3 to 5 minutes if not effective. Call 911 2 mL 11    polyethylene glycol (GLYCOLAX) 17 gram/dose powder Take 17 g by mouth once daily. 235 g 0    sacubitriL-valsartan (ENTRESTO) 24-26 mg per tablet Take 1 tablet by mouth 2 (two) times daily. 60 tablet 3     Facility-Administered Medications Ordered in Other Encounters   Medication Dose Route Frequency Provider Last Rate Last Admin    0.9%  NaCl infusion   Intravenous Continuous Soni Bhatti MD   New Bag at 07/21/23 1116       FamHx:  Family History   Problem Relation Age of Onset    Hypertension Mother     Diabetes Father     Kidney disease Father     No Known Problems Sister     Heart disease Sister     No Known Problems Sister     No Known Problems Brother     No Known Problems Brother     Cataracts Maternal Aunt     No Known Problems Maternal Uncle     No Known Problems Paternal Aunt     No Known Problems Paternal Uncle     No Known Problems Maternal Grandmother     Diabetes Maternal Grandfather     No Known Problems Paternal Grandmother     No Known Problems Paternal Grandfather     No Known Problems Son     No Known Problems Son     No Known Problems Other     Breast cancer Neg Hx     Colon cancer Neg Hx     Cancer Neg Hx     Stroke Neg Hx     Amblyopia Neg Hx     Blindness Neg Hx     Glaucoma Neg Hx     Macular degeneration Neg Hx     Retinal detachment Neg Hx     Strabismus Neg Hx     Thyroid disease Neg Hx        SocHx:  Social History     Socioeconomic History    Marital status:     Number of children: 2   Occupational History    Occupation: lucero     Employer: [id number]   Tobacco Use    Smoking status: Never     Passive exposure: Never    Smokeless tobacco: Never   Substance and Sexual  Activity    Alcohol use: No    Drug use: Yes     Types: Hydrocodone    Sexual activity: Not Currently     Social Determinants of Health     Financial Resource Strain: Low Risk  (5/23/2022)    Overall Financial Resource Strain (CARDIA)     Difficulty of Paying Living Expenses: Not hard at all   Food Insecurity: No Food Insecurity (5/23/2022)    Hunger Vital Sign     Worried About Running Out of Food in the Last Year: Never true     Ran Out of Food in the Last Year: Never true   Transportation Needs: No Transportation Needs (5/23/2022)    PRAPARE - Transportation     Lack of Transportation (Medical): No     Lack of Transportation (Non-Medical): No   Physical Activity: Insufficiently Active (5/23/2022)    Exercise Vital Sign     Days of Exercise per Week: 3 days     Minutes of Exercise per Session: 30 min   Stress: No Stress Concern Present (5/23/2022)    [place] [employer] - Occupational Stress Questionnaire     Feeling of Stress : Not at all   Social Connections: Socially Integrated (5/23/2022)    Social Connection and Isolation Panel [NHANES]     Frequency of Communication with Friends and Family: More than three times a week     Frequency of Social Gatherings with Friends and Family: Once a week     Attends [employer]ism Services: 1 to 4 times per year     Active Member of Clubs or Organizations: Yes     Attends Club or Organization Meetings: 1 to 4 times per year     Marital Status:    Housing Stability: Low Risk  (5/23/2022)    Housing Stability Vital Sign     Unable to Pay for Housing in the Last Year: No     Number of Places Lived in the Last Year: 1     Unstable Housing in the Last Year: No           Review of Systems: see H&P       Physical Exam:  Vitals:   Vitals:    08/25/23 1202   BP:    Pulse: 71   Resp: 11       No intake/output data recorded.  No intake/output data recorded.    General: No apparent distress.   Neck: supple   Lungs: Unlabored breathing  Heart: RRR  Abdomen:  "n/a  Ext: n/a  Neurologic: Awake and alert, oriented x 3       Laboratories:    Recent Labs   Lab 08/25/23  1108   WBC 3.95   RBC 3.12*   HGB 10.6*   HCT 35.1*   *   *   MCH 34.0*   MCHC 30.2*       Recent Labs   Lab 08/25/23  1108   CALCIUM 9.0   ALBUMIN 2.6*   PROT 6.7      K 3.7   CO2 21*      BUN 37*   CREATININE 4.5*   ALKPHOS 334*   ALT 36   AST 41*   BILITOT 0.7       No results for input(s): "COLORU", "CLARITYU", "SPECGRAV", "PHUR", "PROTEINUA", "GLUCOSEU", "BILIRUBINCON", "BLOODU", "WBCU", "RBCU", "BACTERIA", "MUCUS" in the last 24 hours.    Microbiology Results (last 7 days)       ** No results found for the last 168 hours. **              Diagnostic Tests:        CXR:  Impression:       Cardiomegaly with pulmonary vascular congestion and trace bilateral pleural effusions, suggestive of fluid overload state.       Electronically signed by: Kirk Adhikari MD   Date: 08/25/2023            Assessment:       58 y/o female with ESRD on HD admitted with:    - Acute hypoxic resp. Failure  - fluid overload  - Chronic combined HF  - Hx. L-Toe ulcer  - PAD LLE with gangrene  - PAF  - Hx. AICD  - Hx. Pulmonary HTN  - Anemia of ckd  - HTN      Plan:    - UF today   - Resume Dialysis Q TTS  - Epogen 3 x week  - Renal diet + protein supplements  - Wound care per Podiatry  - Discharge planning and other problems per admitting         "

## 2023-08-25 NOTE — NURSING
[facility]  Nurses Note -- 4 Eyes      8/25/2023       Skin assessed on: Q Shift      [] No Pressure Injuries Present    []Prevention Measures Documented    [x] Yes LDA  for Pressure Injury Previously documented     [] Yes New Pressure Injury Discovered   [] LDA for New Pressure Injury Added      Attending RN:  Valerie Gross RN     Second RN:  FELIPE Zepeda

## 2023-08-25 NOTE — ED TRIAGE NOTES
"Pt presents to the ED for CC of SOB. Pt was waiting for a ride to her DR laura and when the ride arrived, pt was found to be very SOB and "breathing hard". Pt is dialysis pt. Last dialyzed yesterday. Pt has fistula in left arm. Pt also has defibrillator placed. Pt denies chest pain, blood in stool, NVD, ha, fever, chills. Pt is alert and oriented but pt is tachynpneic, hypertensive, and weak. Family is at bedside.  "

## 2023-08-25 NOTE — ASSESSMENT & PLAN NOTE
-Plan for HD today   -Continue home entresto, metoprolol, hydralazine, and aspirin   -Continuous cardiac monitor   -1.5 L fluid restriction; daily weights   -SBP goal <160  -Cardiac/renal diet

## 2023-08-25 NOTE — ASSESSMENT & PLAN NOTE
Patient with Paroxysmal (<7 days) atrial fibrillation which is controlled currently with Beta Blocker. Patient is currently in sinus rhythm.[id number] Score: 2.  Anticoagulation indicated. Anticoagulation done with apixaban.

## 2023-08-25 NOTE — ED PROVIDER NOTES
Encounter Date: 8/25/2023    SCRIBE #1 NOTE: I, Sharifa Latham, am scribing for, and in the presence of,  Rasta Garsia Jr., MD. I have scribed the following portions of the note - Other sections scribed: HPI, ROS.       History     Chief Complaint   Patient presents with    Shortness of Breath     Patient with increased SOB denies CP, states started today denies cough, congestion or fever, dialysis on T, TH . Sat     CC: Shortness of breath    HPI: This is a 57 year old female with Afib (defibrillator in place), kidney cancer, anemia, CHF, ESRD on dialysis (T,Th, Sat), and HSV2, who presents to the ED with shortness of breath that began this morning. Patient did go to dialysis yesterday. Patient does not make urine. Patient was admitted on 7/19/23 for a blood transfusion. Patient had left arm graft excision and ligation on 7/21/23. Patient denies cough, congestion, fever, bloody or black stool, vomiting, diarrhea, or chest pain. Patient is on Eliquis.     The history is provided by the patient and the spouse. No  was used.     Review of patient's allergies indicates:   Allergen Reactions    Coreg [carvedilol] Other (See Comments)     Nausea/vomiting    Allopurinol      Other reaction(s): abnormal transaminases     Past Medical History:   Diagnosis Date    Anemia     Atrial fibrillation     Bronchitis 03/01/2017    Cancer 2016    kidney cancer    CHF (congestive heart failure), NYHA class II, chronic, systolic     CMV (cytomegalovirus) antibody positive     Encounter for blood transfusion     ESRD (end stage renal disease)     Essential hypertension 09/23/2015    H/O herpes simplex type 2 infection     Herpes simplex type 1 antibody positive     History of kidney cancer     s/p left nephrectomy 1/2016    Hyperparathyroidism, unspecified     Hyperuricemia without signs of inflammatory arthritis and tophaceous disease     Kidney stones     LGSIL (low grade squamous intraepithelial  dysplasia)     Myocardiopathy 07/21/2017    Prediabetes     Proteinuria     Renal disorder     Thyroid nodule     Urate nephropathy      Past Surgical History:   Procedure Laterality Date    BREAST CYST EXCISION      COLONOSCOPY N/A 11/12/2015    COLONOSCOPY N/A 3/12/2021    Procedure: COLONOSCOPY;  Surgeon: Brendon Lanier MD;  Location: [facility] ENDO;  Service: Endoscopy;  Laterality: N/A;  covid test 3/9, labs prior, prep instr mailed -ml    INSERTION OF BIVENTRICULAR IMPLANTABLE CARDIOVERTER-DEFIBRILLATOR (ICD)  04/2021    INSERTION OF TUNNELED CENTRAL VENOUS CATHETER (CVC) WITH SUBCUTANEOUS PORT N/A 1/25/2023    Procedure: INSERTION, DUAL LUMEN CATHETER WITH PORT, WITH IMAGING GUIDANCE;  Surgeon: FARIDEH Muñoz III, MD;  [facility];  Service: Peripheral Vascular;  Laterality: N/A;  possinble permcath placment    NEPHRECTOMY-LAPAROSCOPIC Left 01/12/2016    PERCUTANEOUS TRANSLUMINAL ANGIOPLASTY OF ARTERIOVENOUS FISTULA Left 4/19/2023    Procedure: PTA, AV FISTULA;  Surgeon: FARIDEH Muñoz III, MD;  Location: [facility] CATH LAB;  Service: Peripheral Vascular;  Laterality: Left;    PERITONEAL CATHETER INSERTION      Permacath insertion  01/12/2017    PLACEMENT OF ARTERIOVENOUS GRAFT  12/28/2022    Procedure: INSERTION, GRAFT, ARTERIOVENOUS;  Surgeon: FARIDEH Muñoz III, MD;  [facility];  Service: Peripheral Vascular;;    REMOVAL, GRAFT, ARTERIOVENOUS, UPPER EXTREMITY Left 1/25/2023    Procedure: REMOVAL, GRAFT, ARTERIOVENOUS, UPPER EXTREMITY;  Surgeon: FARIDEH Muñoz III, MD;  Location: [facility] OR [facility] FLR;  Service: Peripheral Vascular;  Laterality: Left;    REVISION OF ARTERIOVENOUS FISTULA Left 5/1/2023    Procedure: REVISION, AV FISTULA;  Surgeon: FARIDEH Muñoz III, MD;  Location: [facility] OR 2ND FLR;  Service: Peripheral Vascular;  Laterality: Left;  LUE AVG revision    REVISION OF PROCEDURE INVOLVING ARTERIOVENOUS GRAFT Left 12/28/2022    Procedure: REVISION, PROCEDURE INVOLVING  ARTERIOVENOUS GRAFT;  Surgeon: FARIDEH Muñoz III, MD;  [facility];  Service: Peripheral Vascular;  Laterality: Left;    REVISION OF PROCEDURE INVOLVING ARTERIOVENOUS GRAFT Left 7/21/2023    Procedure: LEFT ARM ARTERIOVENOUS GRAFT EXCISION  AND LIGATION;  Surgeon: Obi Werner MD;  Location: [facility] OR;  Service: Vascular;  Laterality: Left;  Left AVG excision and revision with interposition    SALPINGOOPHORECTOMY Right 2016    KJB---DAVINCI    TONSILLECTOMY      TUBAL LIGATION       Family History   Problem Relation Age of Onset    Hypertension Mother     Diabetes Father     Kidney disease Father     No Known Problems Sister     Heart disease Sister     No Known Problems Sister     No Known Problems Brother     No Known Problems Brother     Cataracts Maternal Aunt     No Known Problems Maternal Uncle     No Known Problems Paternal Aunt     No Known Problems Paternal Uncle     No Known Problems Maternal Grandmother     Diabetes Maternal Grandfather     No Known Problems Paternal Grandmother     No Known Problems Paternal Grandfather     No Known Problems Son     No Known Problems Son     No Known Problems Other     Breast cancer Neg Hx     Colon cancer Neg Hx     Cancer Neg Hx     Stroke Neg Hx     Amblyopia Neg Hx     Blindness Neg Hx     Glaucoma Neg Hx     Macular degeneration Neg Hx     Retinal detachment Neg Hx     Strabismus Neg Hx     Thyroid disease Neg Hx      Social History     Tobacco Use    Smoking status: Never     Passive exposure: Never    Smokeless tobacco: Never   Substance Use Topics    Alcohol use: No    Drug use: Yes     Types: Hydrocodone     Review of Systems   Constitutional:  Negative for chills and fever.   HENT:  Negative for congestion and sore throat.    Eyes:  Negative for visual disturbance.   Respiratory:  Positive for shortness of breath. Negative for cough.    Cardiovascular:  Negative for chest pain and leg swelling.   Gastrointestinal:  Negative for  abdominal pain, blood in stool, diarrhea, nausea and vomiting.   Genitourinary:  Negative for dysuria.   Musculoskeletal:  Negative for back pain.   Skin:  Negative for rash.   Neurological:  Negative for weakness.   Psychiatric/Behavioral:  Negative for confusion.    All other systems reviewed and are negative.      Physical Exam     Initial Vitals   BP Pulse Resp Temp SpO2   08/25/23 1009 08/25/23 1009 08/25/23 1009 08/25/23 1253 08/25/23 1009   (!) 137/91 72 (!) 24 98.3 °F (36.8 °C) 100 %      MAP       --                Physical Exam    Nursing note and vitals reviewed.  Constitutional: She appears well-developed and well-nourished. She is not diaphoretic. No distress.   HENT:   Head: Normocephalic and atraumatic.   Right Ear: External ear normal.   Left Ear: External ear normal.   Nose: Nose normal.   Mouth/Throat: Oropharynx is clear and moist.   Eyes: Conjunctivae and EOM are normal. Pupils are equal, round, and reactive to light. Right eye exhibits no discharge. Left eye exhibits no discharge. No scleral icterus.   Neck: Neck supple.   Normal range of motion.  Cardiovascular:  Normal rate, regular rhythm, normal heart sounds and intact distal pulses.           No murmur heard.  Pulmonary/Chest: Breath sounds normal. No respiratory distress. She has no wheezes. She has no rhonchi. She has no rales.   Patient appears to be mildly tachypneic.  Lungs are clear wheezes, rales, rhonchi.   Abdominal: Abdomen is soft. Bowel sounds are normal. She exhibits no distension and no mass. There is no abdominal tenderness. There is no rebound and no guarding.   Musculoskeletal:         General: Edema present. No tenderness. Normal range of motion.      Cervical back: Normal range of motion and neck supple.      Comments: Symmetric bilateral nonpitting trace lower extremity edema.     Neurological: She is alert and oriented to person, place, and time. She has normal strength. No cranial nerve deficit or sensory deficit.    Skin: Skin is warm and dry. No rash noted. No erythema. No pallor.   Psychiatric: She has a normal mood and affect. Her behavior is normal. Judgment and thought content normal.         ED Course   Procedures  Labs Reviewed   CBC W/ AUTO DIFFERENTIAL - Abnormal; Notable for the following components:       Result Value    RBC 3.12 (*)     Hemoglobin 10.6 (*)     Hematocrit 35.1 (*)      (*)     MCH 34.0 (*)     MCHC 30.2 (*)     RDW 20.7 (*)     Platelets 118 (*)     Gran # (ANC) 1.6 (*)     nRBC 3 (*)     Basophil % 2.0 (*)     All other components within normal limits   COMPREHENSIVE METABOLIC PANEL - Abnormal; Notable for the following components:    CO2 21 (*)     BUN 37 (*)     Creatinine 4.5 (*)     Albumin 2.6 (*)     Alkaline Phosphatase 334 (*)     AST 41 (*)     eGFR 11 (*)     All other components within normal limits   TROPONIN I - Abnormal; Notable for the following components:    Troponin I 0.081 (*)     All other components within normal limits   B-TYPE NATRIURETIC PEPTIDE - Abnormal; Notable for the following components:    BNP >4,900 (*)     All other components within normal limits   LACTIC ACID, PLASMA - Abnormal; Notable for the following components:    Lactate (Lactic Acid) 3.1 (*)     All other components within normal limits   SARS-COV-2 RDRP GENE   TYPE & SCREEN     EKG Readings: (Independently Interpreted)   Initial Reading: No STEMI. Ectopy: No Ectopy.   EKG reviewed and interpreted by me shows sinus rhythm at a rate of 70 beats per minute.  The [place], QRS intervals are normal.  The QTC is prolonged at 505 milliseconds.  There is a normal axis.  There is poor R-wave progression across the precordium.  There is LVH.  There are no ST segment or T-wave ischemic findings.         Imaging Results              X-Ray Chest AP Portable (Final result)  Result time 08/25/23 11:34:15      Final result by Kirk Adhikari MD (08/25/23 11:34:15)                   Impression:      Cardiomegaly with  pulmonary vascular congestion and trace bilateral pleural effusions, suggestive of fluid overload state.      Electronically signed by: Kirk Adhikari MD  Date:    08/25/2023  Time:    11:34               Narrative:    EXAMINATION:  XR CHEST AP PORTABLE    CLINICAL HISTORY:  Shortness of breath    TECHNIQUE:  Single frontal view of the chest was performed.    COMPARISON:  07/19/2023.    FINDINGS:  The left-sided AICD remains in stable position.  There is also stable vascular stent in the left axillary region.  There is an interval left-sided central catheter with its tip in the right atrium.  Monitoring EKG leads are present.    The trachea is unremarkable.  There are calcifications of the aortic knob.  The cardiomediastinal silhouette is enlarged.  There is no evidence of free air beneath the hemidiaphragms.  There are new small bilateral pleural effusions.  There is no evidence of a pneumothorax.  There is no evidence of pneumomediastinum.  There is pulmonary vascular congestion.  There are degenerative changes in the osseous structures.                                    X-Rays:   Independently Interpreted Readings:   Other Readings:  Chest x-ray reveals pulmonary edema.    Medications   sodium chloride 0.9% bolus 250 mL 250 mL (has no administration in time range)   epoetin david injection 4,000 Units (has no administration in time range)     Medical Decision Making  This is the emergent evaluation of a 57-year-old female who presents emergency department for evaluation of shortness of breath.  She is denying chest pain.  Differential diagnosis at the time of initial evaluation included, but was not limited to:  CHF exacerbation, pneumonia, viral infection, insufficient fluid management with yesterday's dialysis, anemia.   There is no leukocytosis.  The patient is afebrile without tachycardia.  No hypotension.  She is not altered.  There is no significant anemia compared to prior lab values.  Patient does have an  elevated lactate of 3.1 of uncertain significance.  I do not suspect infection.  Patient appears volume overloaded.  She had dialysis yesterday.  She does not make urine.  She may need further volume optimization.  Her BNP is greater than 4900.  Troponin is chronically elevated.  She does not have chest pain.  No ischemic findings on EKG.  Case discussed with nephrology.  They will place dialysis orders.  Case discussed with the observation hospitalist team.    Amount and/or Complexity of Data Reviewed  Independent Historian: spouse  External Data Reviewed: notes.  Labs: ordered. Decision-making details documented in ED Course.  Radiology: ordered and independent interpretation performed. Decision-making details documented in ED Course.  ECG/medicine tests: ordered and independent interpretation performed. Decision-making details documented in ED Course.            Scribe Attestation:   Scribe #1: I performed the above scribed service and the documentation accurately describes the services I performed. I attest to the accuracy of the note.                      I, Rasta Garsia MD, personally performed the services described in this documentation.  All medical record entries made by the scribe were at my direction and in my presence.  I have reviewed the chart and agree that the record reflects my personal performance and is accurate and complete.   Clinical Impression:   Final diagnoses:  [R06.02] Shortness of breath               Rasta Garsia Jr., MD  08/25/23 7975

## 2023-08-25 NOTE — PLAN OF CARE
Case Management Assessment     PCP: Adán Deng  Pharm[facility] [address]    Patient Arrived From: home  Existing Help at Home: spouse    Barriers to Discharge: none    Discharge Plan:    A. Home with family   B. Home with family      SW completed brief assessment and discussed discharge planning with patient at her bedside. Patient stated that she lives with her spouse who is her main support. Patient's spouse will provide transportation for her to get home when discharge from the hospital.      08/25/23 1546   Discharge Planning   Assessment Type Discharge Planning Assessment   Resource/Environmental Concerns none   Support Systems Spouse/significant other   Equipment Currently Used at Home bedside commode;walker, rolling;cane, straight   Current Living Arrangements home   Patient/Family Anticipates Transition to home with family   Patient/Family Anticipated Services at Transition none   DME Needed Upon Discharge  none   Discharge Plan A Home with family   Discharge Plan B Home with family

## 2023-08-25 NOTE — SUBJECTIVE & OBJECTIVE
Past Medical History:   Diagnosis Date    Anemia     Atrial fibrillation     Bronchitis 03/01/2017    Cancer 2016    kidney cancer    CHF (congestive heart failure), NYHA class II, chronic, systolic     CMV (cytomegalovirus) antibody positive     Encounter for blood transfusion     ESRD (end stage renal disease)     Essential hypertension 09/23/2015    H/O herpes simplex type 2 infection     Herpes simplex type 1 antibody positive     History of kidney cancer     s/p left nephrectomy 1/2016    Hyperparathyroidism, unspecified     Hyperuricemia without signs of inflammatory arthritis and tophaceous disease     Kidney stones     LGSIL (low grade squamous intraepithelial dysplasia)     Myocardiopathy 07/21/2017    Prediabetes     Proteinuria     Renal disorder     Thyroid nodule     Urate nephropathy        Past Surgical History:   Procedure Laterality Date    BREAST CYST EXCISION      COLONOSCOPY N/A 11/12/2015    COLONOSCOPY N/A 3/12/2021    Procedure: COLONOSCOPY;  Surgeon: Brendon Lanier MD;  Location: [facility] ENDO;  Service: Endoscopy;  Laterality: N/A;  covid test 3/9, labs prior, prep instr mailed -ml    INSERTION OF BIVENTRICULAR IMPLANTABLE CARDIOVERTER-DEFIBRILLATOR (ICD)  04/2021    INSERTION OF TUNNELED CENTRAL VENOUS CATHETER (CVC) WITH SUBCUTANEOUS PORT N/A 1/25/2023    Procedure: INSERTION, DUAL LUMEN CATHETER WITH PORT, WITH IMAGING GUIDANCE;  Surgeon: FARIDEH Muñoz III, MD;  Location: [facility] OR [address] [place] [address];  Service: Peripheral Vascular;  Laterality: N/A;  possinble permcath placment    NEPHRECTOMY-LAPAROSCOPIC Left 01/12/2016    PERCUTANEOUS TRANSLUMINAL ANGIOPLASTY OF ARTERIOVENOUS FISTULA Left 4/19/2023    Procedure: PTA, AV FISTULA;  Surgeon: FARIDEH Muñoz III, MD;  Location: [facility] CATH LAB;  Service: Peripheral Vascular;  Laterality: Left;    PERITONEAL CATHETER INSERTION      Permacath insertion  01/12/2017    PLACEMENT OF ARTERIOVENOUS GRAFT  12/28/2022    Procedure: INSERTION, GRAFT,  ARTERIOVENOUS;  Surgeon: FARIDEH Muñoz III, MD;  Location: [facility] OR 2ND FLR;  Service: Peripheral Vascular;;    REMOVAL, GRAFT, ARTERIOVENOUS, UPPER EXTREMITY Left 1/25/2023    Procedure: REMOVAL, GRAFT, ARTERIOVENOUS, UPPER EXTREMITY;  Surgeon: FARIDEH Muñoz III, MD;  Location: [facility] OR 2ND FLR;  Service: Peripheral Vascular;  Laterality: Left;    REVISION OF ARTERIOVENOUS FISTULA Left 5/1/2023    Procedure: REVISION, AV FISTULA;  Surgeon: FARIDEH Muñoz III, MD;  Location: [facility] OR [facility] FLR;  Service: Peripheral Vascular;  Laterality: Left;  LUE AVG revision    REVISION OF PROCEDURE INVOLVING ARTERIOVENOUS GRAFT Left 12/28/2022    Procedure: REVISION, PROCEDURE INVOLVING ARTERIOVENOUS GRAFT;  Surgeon: FARIDEH Muñoz III, MD;  Location: [facility] OR [facility] FLR;  Service: Peripheral Vascular;  Laterality: Left;    REVISION OF PROCEDURE INVOLVING ARTERIOVENOUS GRAFT Left 7/21/2023    Procedure: LEFT ARM ARTERIOVENOUS GRAFT EXCISION  AND LIGATION;  Surgeon: Obi Werner MD;  Location: [facility];  Service: Vascular;  Laterality: Left;  Left AVG excision and revision with interposition    SALPINGOOPHORECTOMY Right 2016    KJB---DAVINCI    TONSILLECTOMY      TUBAL LIGATION         Review of patient's allergies indicates:   Allergen Reactions    Coreg [carvedilol] Other (See Comments)     Nausea/vomiting    Allopurinol      Other reaction(s): abnormal transaminases       Current Facility-Administered Medications on File Prior to Encounter   Medication    0.9%  NaCl infusion     Current Outpatient Medications on File Prior to Encounter   Medication Sig    apixaban (ELIQUIS) 2.5 mg Tab Take 1 tablet (2.5 mg total) by mouth 2 (two) times daily.    aspirin 81 MG Chew Take 1 tablet (81 mg total) by mouth once daily.    cabozantinib (CABOMETYX) 60 mg Tab TAKE ONE TABLET BY MOUTH ONCE DAILY AT THE SAME TIME ON AN EMPTY STOMACH AT LEAST 1 HOUR BEFORE OR 2 HOURS AFTER EATING. AVOID GRAPEFRUIT PRODUCTS    cloNIDine 0.3 mg/24  hr td ptwk (CATAPRES) 0.3 mg/24 hr Place 1 patch onto the skin every 7 days.    hydrALAZINE (APRESOLINE) 100 MG tablet Take 1 tablet (100 mg total) by mouth every 12 (twelve) hours.    HYDROcodone-acetaminophen (NORCO) 5-325 mg per tablet Take 1 tablet by mouth every 6 (six) hours as needed for Pain.    levothyroxine (SYNTHROID) 75 MCG tablet Take 1 tablet (75 mcg total) by mouth once daily.    metoprolol succinate (TOPROL-XL) 100 MG 24 hr tablet Take 1/2 tablet (50mg) once daily    mv,Ca,min-folic acid-vit K1 (ONE-A-DAY WOMEN'S 50 PLUS) 400-20 mcg Tab Take 1 tablet by mouth once daily.    naloxone (NARCAN) 1 mg/mL injection 2 mg (1 mg per nostril) by Nasal route as needed for opioid overdose; may repeat in 3 to 5 minutes if not effective. Call 911    sacubitriL-valsartan (ENTRESTO) 24-26 mg per tablet Take 1 tablet by mouth 2 (two) times daily.    acetaminophen (TYLENOL) 500 MG tablet Take 500 mg by mouth every 6 (six) hours as needed for Pain.    cyclobenzaprine (FLEXERIL) 5 MG tablet Take 1 tablet (5 mg total) by mouth daily as needed for Muscle spasms. (Patient not taking: Reported on 8/25/2023)    lidocaine-prilocaine (EMLA) cream APPLY ATLEAST 30 MINUTES BEFORE TREATMENT 3 TIMES A WEEK    [DISCONTINUED] amLODIPine (NORVASC) 5 MG tablet TAKE 1 TABLET BY MOUTH EVERY DAY    [DISCONTINUED] gabapentin (NEURONTIN) 100 MG capsule Take 1 capsule (100 mg total) by mouth once daily.    [DISCONTINUED] polyethylene glycol (GLYCOLAX) 17 gram/dose powder Take 17 g by mouth once daily.     Family History       Problem Relation (Age of Onset)    Cataracts Maternal Aunt    Diabetes Father, Maternal Grandfather    Heart disease Sister    Hypertension Mother    Kidney disease Father    No Known Problems Sister, Sister, Brother, Brother, Maternal Uncle, Paternal Aunt, Paternal Uncle, Maternal Grandmother, Paternal Grandmother, Paternal Grandfather, Son, Son, Other          Tobacco Use    Smoking status: Never     Passive  exposure: Never    Smokeless tobacco: Never   Substance and Sexual Activity    Alcohol use: No    Drug use: Yes     Types: Hydrocodone    Sexual activity: Not Currently     Review of Systems   Constitutional:  Positive for fatigue. Negative for chills and fever.   HENT:  Negative for congestion and rhinorrhea.    Eyes:  Negative for photophobia and visual disturbance.   Respiratory:  Positive for shortness of breath. Negative for cough.    Cardiovascular:  Positive for leg swelling. Negative for chest pain and palpitations.   Gastrointestinal:  Negative for abdominal pain, diarrhea, nausea and vomiting.   Genitourinary:  Negative for dysuria, frequency and urgency.   Skin:  Positive for wound. Negative for pallor and rash.   Neurological:  Negative for light-headedness and headaches.   Psychiatric/Behavioral:  Negative for confusion and decreased concentration.      Objective:     Vital Signs (Most Recent):  Temp: 98.3 °F (36.8 °C) (08/25/23 1253)  Pulse: 71 (08/25/23 1239)  Resp: 19 (08/25/23 1248)  BP: (!) 141/88 (08/25/23 1239)  SpO2: 99 % (08/25/23 1239) Vital Signs (24h Range):  Temp:  [98.3 °F (36.8 °C)] 98.3 °F (36.8 °C)  Pulse:  [71-72] 71  Resp:  [11-24] 19  SpO2:  [98 %-100 %] 99 %  BP: (137-141)/(88-91) 141/88     Weight: 43.1 kg (95 lb)  Body mass index is 16.31 kg/m².     Physical Exam  Vitals and nursing note reviewed.   Constitutional:       General: She is not in acute distress.     Appearance: She is well-developed.   HENT:      Head: Normocephalic and atraumatic.      Right Ear: External ear normal.      Left Ear: External ear normal.      Nose: Nose normal.   Eyes:      Conjunctiva/sclera: Conjunctivae normal.   Cardiovascular:      Rate and Rhythm: Normal rate and regular rhythm.   Pulmonary:      Effort: Pulmonary effort is normal. No respiratory distress.   Abdominal:      General: There is no distension.      Palpations: Abdomen is soft.   Musculoskeletal:         General: Normal range of  motion.      Right lower leg: Edema present.      Left lower leg: Edema present.   Skin:     General: Skin is warm and dry.   Neurological:      Mental Status: She is alert and oriented to person, place, and time.   Psychiatric:         Thought Content: Thought content normal.                Significant Labs: All pertinent labs within the past 24 hours have been reviewed.    Significant Imaging: I have reviewed all pertinent imaging results/findings within the past 24 hours.

## 2023-08-26 VITALS
HEIGHT: 64 IN | SYSTOLIC BLOOD PRESSURE: 134 MMHG | HEART RATE: 66 BPM | BODY MASS INDEX: 17.35 KG/M2 | WEIGHT: 101.63 LBS | OXYGEN SATURATION: 99 % | RESPIRATION RATE: 18 BRPM | DIASTOLIC BLOOD PRESSURE: 85 MMHG | TEMPERATURE: 98 F

## 2023-08-26 LAB
ANION GAP SERPL CALC-SCNC: 15 MMOL/L (ref 8–16)
BUN SERPL-MCNC: 40 MG/DL (ref 6–20)
CALCIUM SERPL-MCNC: 8.2 MG/DL (ref 8.7–10.5)
CHLORIDE SERPL-SCNC: 107 MMOL/L (ref 95–110)
CO2 SERPL-SCNC: 17 MMOL/L (ref 23–29)
CREAT SERPL-MCNC: 4.5 MG/DL (ref 0.5–1.4)
EST. GFR  (NO RACE VARIABLE): 11 ML/MIN/1.73 M^2
ESTIMATED AVG GLUCOSE: 71 MG/DL (ref 68–131)
GLUCOSE SERPL-MCNC: 78 MG/DL (ref 70–110)
HBA1C MFR BLD: 4.1 % (ref 4–5.6)
LACTATE SERPL-SCNC: 3 MMOL/L (ref 0.5–2.2)
POTASSIUM SERPL-SCNC: 4.4 MMOL/L (ref 3.5–5.1)
SODIUM SERPL-SCNC: 139 MMOL/L (ref 136–145)

## 2023-08-26 PROCEDURE — G0378 HOSPITAL OBSERVATION PER HR: HCPCS

## 2023-08-26 PROCEDURE — 83605 ASSAY OF LACTIC ACID: CPT | Performed by: HOSPITALIST

## 2023-08-26 PROCEDURE — 80048 BASIC METABOLIC PNL TOTAL CA: CPT | Performed by: HOSPITALIST

## 2023-08-26 PROCEDURE — 25000003 PHARM REV CODE 250: Performed by: HOSPITALIST

## 2023-08-26 RX ADMIN — HYDRALAZINE HYDROCHLORIDE 50 MG: 25 TABLET, FILM COATED ORAL at 01:08

## 2023-08-26 RX ADMIN — SACUBITRIL AND VALSARTAN 1 TABLET: 24; 26 TABLET, FILM COATED ORAL at 01:08

## 2023-08-26 RX ADMIN — LEVOTHYROXINE SODIUM 75 MCG: 75 TABLET ORAL at 05:08

## 2023-08-26 RX ADMIN — APIXABAN 2.5 MG: 2.5 TABLET, FILM COATED ORAL at 01:08

## 2023-08-26 NOTE — NURSING
Received report from IMAN Calhoun. Patient lying in bed resting, NAD noted. Safety Precautions in progress.

## 2023-08-26 NOTE — PLAN OF CARE
08/26/23 0845   Post-Acute Status   Post-Acute Authorization Dialysis   Diaylsis Status Set-up Complete/Auth obtained  ([facility])   Hospital Resources/Appts/Education Provided Dilaysis schedule provided   Discharge Delays None known at this time   Discharge Plan   Discharge Plan A Home with family  (Follow-up; Out-Patient HD with [facility] T/Th/S)   Discharge Plan B Home with family  (Follow-Up; Out-Patient Hemodialysis with [facility])

## 2023-08-26 NOTE — NURSING
[facility]  Nurses Note -- 4 Eyes      8/26/2023       Skin assessed on: Q Shift      [] No Pressure Injuries Present    []Prevention Measures Documented    [] Yes LDA  for Pressure Injury Previously documented     [] Yes New Pressure Injury Discovered   [] LDA for New Pressure Injury Added      Attending RN:  Valerie Gross RN     Second RN:  IMAN Calhoun

## 2023-08-26 NOTE — NURSING
[facility]  Nurses Note -- 4 Eyes      8/26/2023       Skin assessed on: Q Shift      [x] No Pressure Injuries Present    [x]Prevention Measures Documented    [] Yes LDA  for Pressure Injury Previously documented     [] Yes New Pressure Injury Discovered   [] LDA for New Pressure Injury Added      Attending RN:  Unique Regan LPN     Second RN:  Valerie Gross RN

## 2023-08-26 NOTE — PROGRESS NOTES
Date of Admission:8/25/2023    SUBJECTIVE:denies fever    Current Facility-Administered Medications   Medication    acetaminophen tablet 650 mg    apixaban tablet 2.5 mg    aspirin chewable tablet 81 mg    hydrALAZINE tablet 50 mg    levothyroxine tablet 75 mcg    melatonin tablet 6 mg    metoprolol succinate (TOPROL-XL) 24 hr tablet 50 mg    ondansetron injection 4 mg    polyethylene glycol packet 17 g    sacubitriL-valsartan 24-26 mg per tablet 1 tablet    sodium chloride 0.9% bolus 250 mL 250 mL    sodium chloride 0.9% flush 10 mL     Facility-Administered Medications Ordered in Other Encounters   Medication    0.9%  NaCl infusion       Wt Readings from Last 3 Encounters:   08/26/23 46.1 kg (101 lb 10.1 oz)   08/11/23 46.7 kg (102 lb 15.3 oz)   08/11/23 46.7 kg (102 lb 15.3 oz)     Temp Readings from Last 3 Encounters:   08/26/23 97.5 °F (36.4 °C) (Axillary)   08/11/23 97.9 °F (36.6 °C) (Oral)   08/02/23 97.7 °F (36.5 °C) (Oral)     BP Readings from Last 3 Encounters:   08/26/23 134/85   08/11/23 110/72   08/02/23 110/70     Pulse Readings from Last 3 Encounters:   08/26/23 66   08/11/23 79   08/02/23 77       Intake/Output Summary (Last 24 hours) at 8/26/2023 1024  Last data filed at 8/26/2023 0745  Gross per 24 hour   Intake 476.6 ml   Output 2500 ml   Net -2023.4 ml       PE:  GEN:wd female in nad, thin  HEENT:ncat,eomi,mm  CVS:q6u5okesiqs  PULM:ctab  ABD:bs,soft  EXT:no leg edema, boots  NEURO:awake  Pc of chest    Recent Labs   Lab 08/25/23  1108 08/26/23  0605   GLU 71 78    139   K 3.7 4.4    107   CO2 21* 17*   BUN 37* 40*   CREATININE 4.5* 4.5*   CALCIUM 9.0 8.2*   MG 2.7*  --        Lab Results   Component Value Date    PTH >2,500.0 (H) 02/17/2023    CALCIUM 8.2 (L) 08/26/2023    PHOS 5.3 (H) 07/20/2023       Recent Labs   Lab 08/25/23  1108   WBC 3.95   RBC 3.12*   HGB 10.6*   HCT 35.1*   *   *   MCH 34.0*   MCHC 30.2*           A/P:  1.esrd. hd today.  2.anemia 2nd to esrd.  Hg at goal.  3.2nd hyperpara. Phos ok.  4.lactic acidosis. Still up. Bicarb with hd. Infection??  5.acute on chronic combined hf. Volume better. More uf today.  6.htn. cont arb.

## 2023-08-26 NOTE — PLAN OF CARE
08/26/23 0848   Final Note   Assessment Type Final Discharge Note   Anticipated Discharge Disposition Home  (O/P-H/D with [facility] T/Th/S)   What phone number can be called within the next 1-3 days to see how you are doing after discharge?   ([phone])   Hospital Resources/Appts/Education Provided Dilaysis schedule provided   Post-Acute Status   Post-Acute Authorization Dialysis  (Home; follow-ups; Out-Patient Hemodialysis with [facility] T/Th/Sat)   Diaylsis Status Set-up Complete/Auth obtained   Discharge Delays None known at this time

## 2023-08-26 NOTE — NURSING
Reported off to oncoming nurse, patient resting in bed, aaox4. Patient can make needs known to staff, minimal assist required for adls and transfers, no acute distress noted, safety precautions maintained.    Chart check completed.

## 2023-08-26 NOTE — PLAN OF CARE
Patient is ready and clear for discharge from Case Management's perspective; informed patient's nurse, Valerie. Discussed and provided patient with written discharge instruction and education.

## 2023-08-26 NOTE — HOSPITAL COURSE
Eva Bloom was placed under observation for dialysis needs.    Throughout her observation stay, Ms. Bloom continued to endorse that she no longer felt short of breath. She received a complete dialysis session on 08/25 without any issues or complications. She was noted to have 2.5 L removed and was -2.2 L prior to discharge. Patient normally dialyzes T/Thurs/Saturday at [facility] in [place]. I personally spoke with outpatient dialysis center, who agreed to dialyze the patient if she was able to arrive prior to 1000 am. Patient and her spouse are in agreement with the plan and requesting discharge, to make it to the outpatient dialysis center in time. Ms. Bloom is medically and hemodynamically stable for discharge. Vitals prior to discharge are as follows: Afebrile at 97.5 °F, HR: 66 bpm, RR: 18, BP: 134/85 with oxygen saturation of 99% on RA.    All findings and plan were explained to the patient. All questions and concerns were answered. Patient verbalized understanding. Patient is in stable condition to d/c home and has been informed to follow up with her PCP within the next 7-10 days to discuss her observation stay and to follow-up with regular dialysis sessions. Patient has been educated to return to the ED if she experiences any further shortness of breath, chest pain, lightheadness, weakness, or discomfort.

## 2023-08-26 NOTE — DISCHARGE SUMMARY
[facility] Medicine  Discharge Summary      Patient Name: Eva Bloom  MRN: [id number]  TRENT: [id number]  Patient Class: OP- Observation  Admission Date: 8/25/2023  Hospital Length of Stay: 0 days  Discharge Date and Time: 8/26/2023 11:19 AM  Attending Physician: No att. providers found   Discharging Provider: Aayush Garcia PA-C  Primary Care Provider: Sowmya Mccain MD    Primary Care Team: AAYUSH GARCIA    HPI:   57 y.o. female with PMHx of CHF, ESRD on HD T/Th/Sat, nonischemic cardiomyopathy, HTN, PAF, and hypothyroidism presents with a complaint of shortness of breath.  SOB started this AM while pt was at rest on her way to an appointment. States the SOB comes and goes. Reports going to dialysis yesterday. Denies ever missing dialysis and reports medication compliance. Pt's  is the caregiver and is at bedside with pt.  Denies any chest pain, syncope, dizziness. Echo May 2023 shows EF 20-25%, grade II DD. In the ED, BNP elevated, evidence of pulmonary edema on chest imaging.  Nephrology consulted for HD.  Placed in observation.    * No surgery found *      Hospital Course:   Eva Bloom was placed under observation for dialysis needs.    Throughout her observation stay, Ms. Bloom continued to endorse that she no longer felt short of breath. She received a complete dialysis session on 08/25 without any issues or complications. She was noted to have 2.5 L removed and was -2.2 L prior to discharge. Patient normally dialyzes T/Thurs/Saturday at [facility] in [place]. I personally spoke with outpatient dialysis center, who agreed to dialyze the patient if she was able to arrive prior to 1000 am. Patient and her spouse are in agreement with the plan and requesting discharge, to make it to the outpatient dialysis center in time. Ms. Bloom is medically and hemodynamically stable for discharge. Vitals prior to discharge are as follows: Afebrile at 97.5 °F, HR: 66 bpm, RR: 18,  BP: 134/85 with oxygen saturation of 99% on RA.    All findings and plan were explained to the patient. All questions and concerns were answered. Patient verbalized understanding. Patient is in stable condition to d/c home and has been informed to follow up with her PCP within the next 7-10 days to discuss her observation stay and to follow-up with regular dialysis sessions. Patient has been educated to return to the ED if she experiences any further shortness of breath, chest pain, lightheadness, weakness, or discomfort.     Goals of Care Treatment Preferences:  Code Status: Full Code          What is most important right now is to focus on symptom/pain control, extending life as long as possible, even it it means sacrificing quality, curative/life-prolongation (regardless of treatment burdens).  Accordingly, we have decided that the best plan to meet the patient's goals includes continuing with treatment.      Consults:   Consults (From admission, onward)        Status Ordering Provider     Inpatient consult to Social Work/Case Management  Once        Provider:  (Not yet assigned)    Completed TRUDI CRAVEN JR          No new Assessment & Plan notes have been filed under this hospital service since the last note was generated.  Service: Hospital Medicine    Final Active Diagnoses:    Diagnosis Date Noted POA    PRINCIPAL PROBLEM:  Acute on chronic combined systolic and diastolic heart failure [I50.43] 08/25/2023 Yes    Hypothyroidism [E03.9] 07/19/2023 Yes     Chronic    PAF (paroxysmal atrial fibrillation) [I48.0] 07/25/2022 Yes     Chronic    Anemia in ESRD (end-stage renal disease) [N18.6, D63.1]  Yes     Chronic    ESRD (end stage renal disease) on dialysis [N18.6, Z99.2]  Not Applicable     Chronic    Cardiomyopathy, nonischemic [I42.8] 07/21/2017 Yes     Chronic    Essential hypertension [I10] 09/23/2015 Yes     Chronic      Problems Resolved During this Admission:       Discharged Condition:  stable    Disposition: Home or Self Care    Follow Up:   Follow-up Information     Sowmya Mccain MD Follow up.    Specialties: Internal Medicine, Pediatrics  Why: [facility] Follow-Up  Contact information:  [address] [place] [address] [phone]             [facility] Out-Patient Hemodialysis Follow up.    Why: Out-Patient HD Tuesday, Thursday, Saturday                     Patient Instructions:      Call MD for:  temperature >100.4     Call MD for:  persistent nausea and vomiting or diarrhea     Call MD for:  severe uncontrolled pain     Call MD for:  redness, tenderness, or signs of infection (pain, swelling, redness, odor or green/yellow discharge around incision site)     Call MD for:  difficulty breathing or increased cough     Call MD for:  severe persistent headache     Call MD for:  worsening rash     Call MD for:  persistent dizziness, light-headedness, or visual disturbances     Call MD for:  increased confusion or weakness       Significant Diagnostic Studies: Labs:   CMP   Recent Labs   Lab 08/25/23  1108 08/26/23  0605    139   K 3.7 4.4    107   CO2 21* 17*   GLU 71 78   BUN 37* 40*   CREATININE 4.5* 4.5*   CALCIUM 9.0 8.2*   PROT 6.7  --    ALBUMIN 2.6*  --    BILITOT 0.7  --    ALKPHOS 334*  --    AST 41*  --    ALT 36  --    ANIONGAP 16 15     CBC   Recent Labs   Lab 08/25/23  1108   WBC 3.95   HGB 10.6*   HCT 35.1*   *       Pending Diagnostic Studies:     Procedure Component Value Units Date/Time    Hemoglobin A1c [[id number]] Collected: 08/25/23 1704    Order Status: Sent Lab Status: In process Updated: 08/25/23 1705    Specimen: Blood          Medications:  Reconciled Home Medications:      Medication List      CONTINUE taking these medications    acetaminophen 500 MG tablet  Commonly known as: TYLENOL  Take 500 mg by mouth every 6 (six) hours as needed for Pain.     apixaban 2.5 mg Tab  Commonly known as: ELIQUIS  Take 1 tablet (2.5 mg  total) by mouth 2 (two) times daily.     aspirin 81 MG Chew  Take 1 tablet (81 mg total) by mouth once daily.     CABOMETYX 60 mg Tab  Generic drug: cabozantinib  TAKE ONE TABLET BY MOUTH ONCE DAILY AT THE SAME TIME ON AN EMPTY STOMACH AT LEAST 1 HOUR BEFORE OR 2 HOURS AFTER EATING. AVOID GRAPEFRUIT PRODUCTS     cloNIDine 0.3 mg/24 hr td ptwk 0.3 mg/24 hr  Commonly known as: CATAPRES  Place 1 patch onto the skin every 7 days.     hydrALAZINE 100 MG tablet  Commonly known as: APRESOLINE  Take 1 tablet (100 mg total) by mouth every 12 (twelve) hours.     HYDROcodone-acetaminophen 5-325 mg per tablet  Commonly known as: NORCO  Take 1 tablet by mouth every 6 (six) hours as needed for Pain.     levothyroxine 75 MCG tablet  Commonly known as: SYNTHROID  Take 1 tablet (75 mcg total) by mouth once daily.     LIDOcaine-prilocaine cream  Commonly known as: EMLA  APPLY ATLEAST 30 MINUTES BEFORE TREATMENT 3 TIMES A WEEK     metoprolol succinate 100 MG 24 hr tablet  Commonly known as: TOPROL-XL  Take 1/2 tablet (50mg) once daily     naloxone 1 mg/mL injection  Commonly known as: NARCAN  2 mg (1 mg per nostril) by Nasal route as needed for opioid overdose; may repeat in 3 to 5 minutes if not effective. Call 911     ONE-A-DAY WOMEN'S 50 PLUS 400-20 mcg Tab  Generic drug: mv,Ca,min-folic acid-vit K1  Take 1 tablet by mouth once daily.     sacubitriL-valsartan 24-26 mg per tablet  Commonly known as: ENTRESTO  Take 1 tablet by mouth 2 (two) times daily.        ASK your doctor about these medications    cyclobenzaprine 5 MG tablet  Commonly known as: FLEXERIL  Take 1 tablet (5 mg total) by mouth daily as needed for Muscle spasms.            Indwelling Lines/Drains at time of discharge:   Lines/Drains/Airways     Central Venous Catheter Line  Duration                Hemodialysis Catheter left subclavian -- days    Permacath 01/25/23 0936 left subclavian 213 days         Hemodialysis AV Graft 04/19/23 1057 Left upper arm 129 days          Hemodialysis Catheter 07/21/23 1638 left internal jugular 35 days                Time spent on the discharge of patient: 45 minutes      Aayush Garcia PA-C  Department of Hospital Medicine  [facility]  08/26/2023

## 2023-08-27 DIAGNOSIS — I42.8 CARDIOMYOPATHY, NONISCHEMIC: ICD-10-CM

## 2023-08-27 DIAGNOSIS — I51.89 COMBINED SYSTOLIC AND DIASTOLIC CARDIAC DYSFUNCTION: ICD-10-CM

## 2023-08-28 ENCOUNTER — TELEPHONE (OUTPATIENT)
Dept: FAMILY MEDICINE | Facility: CLINIC | Age: 58
End: 2023-08-28
Payer: MEDICARE

## 2023-08-28 RX ORDER — SACUBITRIL AND VALSARTAN 49; 51 MG/1; MG/1
1 TABLET, FILM COATED ORAL 2 TIMES DAILY
Qty: 180 TABLET | Refills: 3 | Status: ON HOLD | OUTPATIENT
Start: 2023-08-28 | End: 2024-01-05 | Stop reason: HOSPADM

## 2023-08-28 NOTE — TELEPHONE ENCOUNTER
----- Message from Carlos Granados RN sent at 8/26/2023  8:52 AM CDT -----  Regarding: Hospital f/u  Please call patient to  schedule a hospital f/u

## 2023-08-29 LAB — BACTERIA BLD CULT: NORMAL

## 2023-08-31 ENCOUNTER — HOSPITAL ENCOUNTER (OUTPATIENT)
Dept: CARDIOLOGY | Facility: HOSPITAL | Age: 58
Discharge: HOME | End: 2023-08-31
Attending: SURGERY
Payer: MEDICARE

## 2023-08-31 DIAGNOSIS — I70.0 AORTIC ATHEROSCLEROSIS: ICD-10-CM

## 2023-08-31 PROCEDURE — 93926 LOWER EXTREMITY STUDY: CPT | Mod: LT

## 2023-08-31 PROCEDURE — 93926 LOWER EXTREMITY STUDY: CPT | Mod: 26,LT,, | Performed by: SURGERY

## 2023-08-31 PROCEDURE — 93926 CV US DOPPLER ARTERIAL LEG LEFT (CUPID ONLY): ICD-10-PCS | Mod: 26,LT,, | Performed by: SURGERY

## 2023-09-01 ENCOUNTER — OFFICE VISIT (OUTPATIENT)
Dept: VASCULAR SURGERY | Facility: CLINIC | Age: 58
End: 2023-09-01
Attending: SURGERY
Payer: MEDICARE

## 2023-09-01 ENCOUNTER — HOSPITAL ENCOUNTER (OUTPATIENT)
Dept: VASCULAR SURGERY | Facility: CLINIC | Age: 58
Discharge: HOME | End: 2023-09-01
Attending: SURGERY
Payer: MEDICARE

## 2023-09-01 ENCOUNTER — CLINICAL SUPPORT (OUTPATIENT)
Dept: CARDIOLOGY | Facility: HOSPITAL | Age: 58
End: 2023-09-01
Attending: INTERNAL MEDICINE
Payer: COMMERCIAL

## 2023-09-01 VITALS
SYSTOLIC BLOOD PRESSURE: 143 MMHG | TEMPERATURE: 98 F | HEART RATE: 99 BPM | DIASTOLIC BLOOD PRESSURE: 92 MMHG | BODY MASS INDEX: 16.7 KG/M2 | WEIGHT: 97.81 LBS | HEIGHT: 64 IN

## 2023-09-01 DIAGNOSIS — I49.8 OTHER CARDIAC ARRHYTHMIA: ICD-10-CM

## 2023-09-01 DIAGNOSIS — N18.6 ESRD (END STAGE RENAL DISEASE) ON DIALYSIS: Primary | ICD-10-CM

## 2023-09-01 DIAGNOSIS — Z99.2 ESRD (END STAGE RENAL DISEASE) ON DIALYSIS: Primary | Chronic | ICD-10-CM

## 2023-09-01 DIAGNOSIS — Z01.818 PRE-OP EVALUATION: ICD-10-CM

## 2023-09-01 DIAGNOSIS — N18.6 ESRD (END STAGE RENAL DISEASE) ON DIALYSIS: Primary | Chronic | ICD-10-CM

## 2023-09-01 DIAGNOSIS — Z99.2 ESRD (END STAGE RENAL DISEASE) ON DIALYSIS: Primary | ICD-10-CM

## 2023-09-01 PROCEDURE — 99999 PR PBB SHADOW E&M-EST. PATIENT-LVL IV: ICD-10-PCS | Mod: PBBFAC,,, | Performed by: SURGERY

## 2023-09-01 PROCEDURE — 93971 EXTREMITY STUDY: CPT | Mod: PBBFAC | Performed by: SURGERY

## 2023-09-01 PROCEDURE — 93282 PRGRMG EVAL IMPLANTABLE DFB: CPT | Mod: 26,GW,, | Performed by: INTERNAL MEDICINE

## 2023-09-01 PROCEDURE — 99024 PR POST-OP FOLLOW-UP VISIT: ICD-10-PCS | Mod: POP,,, | Performed by: SURGERY

## 2023-09-01 PROCEDURE — 93971 PR US DUPLEX, UPPER OR LOWER EXT VENOUS,UNILAT OR LTD: ICD-10-PCS | Mod: 26,S$PBB,, | Performed by: SURGERY

## 2023-09-01 PROCEDURE — 99999 PR PBB SHADOW E&M-EST. PATIENT-LVL IV: CPT | Mod: PBBFAC,,, | Performed by: SURGERY

## 2023-09-01 PROCEDURE — 93971 EXTREMITY STUDY: CPT | Mod: 26,S$PBB,, | Performed by: SURGERY

## 2023-09-01 PROCEDURE — 93282 PRGRMG EVAL IMPLANTABLE DFB: CPT

## 2023-09-01 PROCEDURE — 99024 POSTOP FOLLOW-UP VISIT: CPT | Mod: POP,,, | Performed by: SURGERY

## 2023-09-01 PROCEDURE — 93282 CARDIAC DEVICE CHECK - IN CLINIC & HOSPITAL: ICD-10-PCS | Mod: 26,GW,, | Performed by: INTERNAL MEDICINE

## 2023-09-01 PROCEDURE — 99214 OFFICE O/P EST MOD 30 MIN: CPT | Mod: PBBFAC,25 | Performed by: SURGERY

## 2023-09-01 NOTE — PROGRESS NOTES
VASCULAR SURGERY SERVICE    CHIEF COMPLAINT:  Functional left AV graft dysfunctional left AV graft    HISTORY OF PRESENT ILLNESS: Eva Bloom is a 57 y.o. female end-stage renal disease, with multiple serious medical comorbidities including admission for sepsis 2 months ago, EF 20%, AFib on Eliquis, who is having increasing bleeding after her dialysis runs.  I placed a revision, left AV graft with long interposition Accu Seal 12/28/2022.  She has had uninterrupted use of this graft ever since.    Recent weeks she is had increased bleeding after dialysis sticks    S/p:    1aaa.  Excision, left AV graft for infection (07/21/2023, Dr. Werner)  1aa. Reclosure, L AVG conter-incision 5/1/2023  1a. PTA, L AVG 4/19/2023  Excision, excluded L AVG and permacath 1/25/2023  revision, left AV graft with long interposition Accu Seal 12/28/2022  Original left AV graft 2017 with subsequent covered stent placement in venous outflow    09/20/2023:  She now returns after excision of the left AV graft for infection proximally 5 weeks ago.  She is been dialyzing through a PermCath.    Past Medical History:   Diagnosis Date    Anemia     Atrial fibrillation     Bronchitis 03/01/2017    Cancer 2016    kidney cancer    CHF (congestive heart failure), NYHA class II, chronic, systolic     CMV (cytomegalovirus) antibody positive     Encounter for blood transfusion     ESRD (end stage renal disease)     Essential hypertension 09/23/2015    H/O herpes simplex type 2 infection     Herpes simplex type 1 antibody positive     History of kidney cancer     s/p left nephrectomy 1/2016    Hyperparathyroidism, unspecified     Hyperuricemia without signs of inflammatory arthritis and tophaceous disease     Kidney stones     LGSIL (low grade squamous intraepithelial dysplasia)     Myocardiopathy 07/21/2017    Prediabetes     Proteinuria     Renal disorder     Thyroid nodule     Urate nephropathy        Past Surgical History:   Procedure  Laterality Date    BREAST CYST EXCISION      COLONOSCOPY N/A 11/12/2015    COLONOSCOPY N/A 3/12/2021    Procedure: COLONOSCOPY;  Surgeon: Brendon Lanier MD;  Location: [facility] ENDO;  Service: Endoscopy;  Laterality: N/A;  covid test 3/9, labs prior, prep instr mailed -ml    INSERTION OF BIVENTRICULAR IMPLANTABLE CARDIOVERTER-DEFIBRILLATOR (ICD)  04/2021    INSERTION OF TUNNELED CENTRAL VENOUS CATHETER (CVC) WITH SUBCUTANEOUS PORT N/A 1/25/2023    Procedure: INSERTION, DUAL LUMEN CATHETER WITH PORT, WITH IMAGING GUIDANCE;  Surgeon: FARIDEH Muñoz III, MD;  [facility];  Service: Peripheral Vascular;  Laterality: N/A;  possinble permcath placment    NEPHRECTOMY-LAPAROSCOPIC Left 01/12/2016    PERCUTANEOUS TRANSLUMINAL ANGIOPLASTY OF ARTERIOVENOUS FISTULA Left 4/19/2023    Procedure: PTA, AV FISTULA;  Surgeon: FARIDEH Muñoz III, MD;  Location: [facility] CATH LAB;  Service: Peripheral Vascular;  Laterality: Left;    PERITONEAL CATHETER INSERTION      Permacath insertion  01/12/2017    PLACEMENT OF ARTERIOVENOUS GRAFT  12/28/2022    Procedure: INSERTION, GRAFT, ARTERIOVENOUS;  Surgeon: FARIDEH Muñoz III, MD;  [facility];  Service: Peripheral Vascular;;    REMOVAL, GRAFT, ARTERIOVENOUS, UPPER EXTREMITY Left 1/25/2023    Procedure: REMOVAL, GRAFT, ARTERIOVENOUS, UPPER EXTREMITY;  Surgeon: FARIDEH Muñoz III, MD;  Location: [facility] OR [facility] FLR;  Service: Peripheral Vascular;  Laterality: Left;    REVISION OF ARTERIOVENOUS FISTULA Left 5/1/2023    Procedure: REVISION, AV FISTULA;  Surgeon: FARIDEH Muñoz III, MD;  Location: [facility] OR [facility] FLR;  Service: Peripheral Vascular;  Laterality: Left;  LUE AVG revision    REVISION OF PROCEDURE INVOLVING ARTERIOVENOUS GRAFT Left 12/28/2022    Procedure: REVISION, PROCEDURE INVOLVING ARTERIOVENOUS GRAFT;  Surgeon: FARIDEH Muñoz III, MD;  Location: [facility] OR [facility] FLR;  Service: Peripheral Vascular;  Laterality: Left;    REVISION OF PROCEDURE INVOLVING  ARTERIOVENOUS GRAFT Left 7/21/2023    Procedure: LEFT ARM ARTERIOVENOUS GRAFT EXCISION  AND LIGATION;  Surgeon: Obi Werner MD;  Location: [facility] OR;  Service: Vascular;  Laterality: Left;  Left AVG excision and revision with interposition    SALPINGOOPHORECTOMY Right 2016    KJB---DAVINCI    TONSILLECTOMY      TUBAL LIGATION           Current Outpatient Medications:     acetaminophen (TYLENOL) 500 MG tablet, Take 500 mg by mouth every 6 (six) hours as needed for Pain., Disp: , Rfl:     apixaban (ELIQUIS) 2.5 mg Tab, Take 1 tablet (2.5 mg total) by mouth 2 (two) times daily., Disp: 60 tablet, Rfl: 11    aspirin 81 MG Chew, Take 1 tablet (81 mg total) by mouth once daily., Disp: 30 tablet, Rfl: 11    cabozantinib (CABOMETYX) 60 mg Tab, TAKE ONE TABLET BY MOUTH ONCE DAILY AT THE SAME TIME ON AN EMPTY STOMACH AT LEAST 1 HOUR BEFORE OR 2 HOURS AFTER EATING. AVOID GRAPEFRUIT PRODUCTS, Disp: 30 tablet, Rfl: 4    cloNIDine 0.3 mg/24 hr td ptwk (CATAPRES) 0.3 mg/24 hr, Place 1 patch onto the skin every 7 days., Disp: 4 patch, Rfl: 11    cyclobenzaprine (FLEXERIL) 5 MG tablet, Take 1 tablet (5 mg total) by mouth daily as needed for Muscle spasms., Disp: 30 tablet, Rfl: 1    ENTRESTO 49-51 mg per tablet, TAKE 1 TABLET BY MOUTH TWICE A DAY, Disp: 180 tablet, Rfl: 3    HYDROcodone-acetaminophen (NORCO) 5-325 mg per tablet, Take 1 tablet by mouth every 6 (six) hours as needed for Pain., Disp: 10 tablet, Rfl: 0    levothyroxine (SYNTHROID) 75 MCG tablet, Take 1 tablet (75 mcg total) by mouth once daily., Disp: 30 tablet, Rfl: 11    lidocaine-prilocaine (EMLA) cream, APPLY ATLEAST 30 MINUTES BEFORE TREATMENT 3 TIMES A WEEK, Disp: 30 g, Rfl: 11    metoprolol succinate (TOPROL-XL) 100 MG 24 hr tablet, Take 1/2 tablet (50mg) once daily, Disp: 90 tablet, Rfl: 3    mv,Ca,min-folic acid-vit K1 (ONE-A-DAY WOMEN'S 50 PLUS) 400-20 mcg Tab, Take 1 tablet by mouth once daily., Disp: , Rfl:     naloxone (NARCAN) 1 mg/mL  injection, 2 mg (1 mg per nostril) by Nasal route as needed for opioid overdose; may repeat in 3 to 5 minutes if not effective. Call 911, Disp: 2 mL, Rfl: 11    sacubitriL-valsartan (ENTRESTO) 24-26 mg per tablet, Take 1 tablet by mouth 2 (two) times daily., Disp: 60 tablet, Rfl: 3    hydrALAZINE (APRESOLINE) 100 MG tablet, Take 1 tablet (100 mg total) by mouth every 12 (twelve) hours., Disp: 180 tablet, Rfl: 2  No current facility-administered medications for this visit.    Facility-Administered Medications Ordered in Other Visits:     0.9%  NaCl infusion, , Intravenous, Continuous, Soni Bhatti MD, New Bag at 07/21/23 1116    Review of patient's allergies indicates:   Allergen Reactions    Coreg [carvedilol] Other (See Comments)     Nausea/vomiting    Allopurinol      Other reaction(s): abnormal transaminases       Family History   Problem Relation Age of Onset    Hypertension Mother     Diabetes Father     Kidney disease Father     No Known Problems Sister     Heart disease Sister     No Known Problems Sister     No Known Problems Brother     No Known Problems Brother     Cataracts Maternal Aunt     No Known Problems Maternal Uncle     No Known Problems Paternal Aunt     No Known Problems Paternal Uncle     No Known Problems Maternal Grandmother     Diabetes Maternal Grandfather     No Known Problems Paternal Grandmother     No Known Problems Paternal Grandfather     No Known Problems Son     No Known Problems Son     No Known Problems Other     Breast cancer Neg Hx     Colon cancer Neg Hx     Cancer Neg Hx     Stroke Neg Hx     Amblyopia Neg Hx     Blindness Neg Hx     Glaucoma Neg Hx     Macular degeneration Neg Hx     Retinal detachment Neg Hx     Strabismus Neg Hx     Thyroid disease Neg Hx        Social History     Tobacco Use    Smoking status: Never     Passive exposure: Never    Smokeless tobacco: Never   Substance Use Topics    Alcohol use: No    Drug use: Yes     Types: Hydrocodone       PHYSICAL  "EXAM:   BP (!) 143/92 (BP Location: Right arm, Patient Position: Sitting, BP Method: Small (Automatic))   Pulse 99   Temp 98 °F (36.7 °C) (Oral)   Ht 5' 4" (1.626 m)   Wt 44.4 kg (97 lb 12.8 oz)   LMP 10/24/2016   BMI 16.79 kg/m²   Constitutional:  Alert,   Well-appearing  In no distress.   Neurological: Normal speech  no focal findings  CN II - XII grossly intact.    Psychiatric: Mood and affect appropriate and symmetric.   HEENT: Normocephalic / atraumatic  PERRLA  Midline trachea  No scars across the neck   Cardiac: Regular rate and rhythm.   Pulmonary: Normal pulmonary effort.   Abdomen: Soft, not distended.     Skin: Warm and well perfused.    Vascular:  Radial pulses are nonpalpable bilaterally.   Extremities/  Musculoskeletal: No edema.   Left arm demonstrates healed incision with interrupted nylons in place upper arm       IMAGING:  Vein mapping of the right arm shows completely inadequate vein for autogenous access      IMPRESSION:    Functioning graft.  Counter incision re-closure is well healed    PLAN  DC sutures today  Follow up in 8 weeks, at that time would anticipate scheduling her for a right upper arm reverse loop prosthetic AV graft    MANPREET Muñoz III, MD, FACS  Professor and Chief, Vascular and Endovascular Surgery                          "

## 2023-09-05 ENCOUNTER — OUTPATIENT CASE MANAGEMENT (OUTPATIENT)
Dept: ADMINISTRATIVE | Facility: OTHER | Age: 58
End: 2023-09-05
Payer: MEDICARE

## 2023-09-05 LAB
LEFT ANT TIBIAL SYS PSV: 89 CM/S
LEFT CFA PSV: 40 CM/S
LEFT EXTERNAL ILIAC PSV: 53 CM/S
LEFT PERONEAL SYS PSV: 33 CM/S
LEFT POPLITEAL PSV: 44 CM/S
LEFT POST TIBIAL SYS PSV: 43 CM/S
LEFT PROFUNDA SYS PSV: 31 CM/S
LEFT SUPER FEMORAL DIST SYS PSV: 41 CM/S
LEFT SUPER FEMORAL MID SYS PSV: 51 CM/S
LEFT SUPER FEMORAL OSTIAL SYS PSV: 41 CM/S
LEFT SUPER FEMORAL PROX SYS PSV: 43 CM/S
LEFT TIB/PER TRUNK SYS PSV: 50 CM/S

## 2023-09-05 NOTE — PROGRESS NOTES
Outpatient Care Management  Patient Not Qualified    Patient: Eva Bloom  MRN:  [id number]  Date of Service:  9/5/2023  Completed by:  Theresa Mccann RN    Chief Complaint   Patient presents with    OPCM Chart Review    OPCM Enrollment Call    Case Closure       Patient Summary           Reason Not Qualified:  Other (see comment)

## 2023-09-06 ENCOUNTER — OUTPATIENT CASE MANAGEMENT (OUTPATIENT)
Dept: ADMINISTRATIVE | Facility: OTHER | Age: 58
End: 2023-09-06
Payer: MEDICARE

## 2023-09-09 LAB
ACID FAST MOD KINY STN SPEC: NORMAL
MYCOBACTERIUM SPEC QL CULT: NORMAL

## 2023-09-11 ENCOUNTER — OUTPATIENT CASE MANAGEMENT (OUTPATIENT)
Dept: ADMINISTRATIVE | Facility: OTHER | Age: 58
End: 2023-09-11
Payer: MEDICARE

## 2023-09-11 DIAGNOSIS — C64.9 METASTATIC RENAL CELL CARCINOMA, UNSPECIFIED LATERALITY: ICD-10-CM

## 2023-09-11 RX ORDER — CABOZANTINIB 60 MG/1
TABLET ORAL
Qty: 30 TABLET | Refills: 4 | Status: SHIPPED | OUTPATIENT
Start: 2023-09-11

## 2023-09-11 NOTE — PROGRESS NOTES
Outpatient Care Management  Patient Does Not Consent    Patient: Eva Bloom  MRN:  [id number]  Date of Service:  9/11/2023  Completed by:  Theresa Mccann, RN    Chief Complaint   Patient presents with    OPCM RN First Assessment Attempt    OPCM RN Second Assessment Attempt    OPCM Chart Review    OPCM Enrollment Call    Case Closure       Patient Summary           Consent Received:  Decline  Decline Reason:  Needs met

## 2023-09-13 ENCOUNTER — LAB VISIT (OUTPATIENT)
Dept: LAB | Facility: HOSPITAL | Age: 58
End: 2023-09-13
Attending: INTERNAL MEDICINE
Payer: MEDICARE

## 2023-09-13 ENCOUNTER — OFFICE VISIT (OUTPATIENT)
Dept: HEMATOLOGY/ONCOLOGY | Facility: CLINIC | Age: 58
End: 2023-09-13
Payer: COMMERCIAL

## 2023-09-13 VITALS
BODY MASS INDEX: 15.36 KG/M2 | DIASTOLIC BLOOD PRESSURE: 73 MMHG | HEIGHT: 64 IN | WEIGHT: 90 LBS | OXYGEN SATURATION: 100 % | SYSTOLIC BLOOD PRESSURE: 99 MMHG | HEART RATE: 57 BPM

## 2023-09-13 DIAGNOSIS — N18.6 ESRD (END STAGE RENAL DISEASE) ON DIALYSIS: ICD-10-CM

## 2023-09-13 DIAGNOSIS — Z90.5 S/P NEPHRECTOMY: ICD-10-CM

## 2023-09-13 DIAGNOSIS — I12.9 HYPERTENSION, RENAL DISEASE: ICD-10-CM

## 2023-09-13 DIAGNOSIS — C64.9 METASTATIC RENAL CELL CARCINOMA TO INTRA-ABDOMINAL SITE: Primary | ICD-10-CM

## 2023-09-13 DIAGNOSIS — R93.89 ABNORMAL FINDINGS ON DIAGNOSTIC IMAGING OF OTHER SPECIFIED BODY STRUCTURES: ICD-10-CM

## 2023-09-13 DIAGNOSIS — Z99.2 ESRD (END STAGE RENAL DISEASE) ON DIALYSIS: ICD-10-CM

## 2023-09-13 DIAGNOSIS — C64.9 METASTATIC RENAL CELL CARCINOMA, UNSPECIFIED LATERALITY: ICD-10-CM

## 2023-09-13 DIAGNOSIS — C64.9 METASTATIC RENAL CELL CARCINOMA TO INTRA-ABDOMINAL SITE: ICD-10-CM

## 2023-09-13 DIAGNOSIS — C64.2 METASTATIC RENAL CELL CARCINOMA, LEFT: ICD-10-CM

## 2023-09-13 DIAGNOSIS — D63.1 ANEMIA DUE TO END STAGE RENAL DISEASE: ICD-10-CM

## 2023-09-13 DIAGNOSIS — E03.9 HYPOTHYROIDISM, UNSPECIFIED TYPE: ICD-10-CM

## 2023-09-13 DIAGNOSIS — D69.59 CHEMOTHERAPY-INDUCED THROMBOCYTOPENIA: ICD-10-CM

## 2023-09-13 DIAGNOSIS — T45.1X5A CHEMOTHERAPY-INDUCED THROMBOCYTOPENIA: ICD-10-CM

## 2023-09-13 DIAGNOSIS — N18.6 ANEMIA DUE TO END STAGE RENAL DISEASE: ICD-10-CM

## 2023-09-13 DIAGNOSIS — C79.89 METASTATIC RENAL CELL CARCINOMA TO INTRA-ABDOMINAL SITE: Primary | ICD-10-CM

## 2023-09-13 DIAGNOSIS — C79.89 METASTATIC RENAL CELL CARCINOMA TO INTRA-ABDOMINAL SITE: ICD-10-CM

## 2023-09-13 DIAGNOSIS — D64.9 ANEMIA, UNSPECIFIED TYPE: ICD-10-CM

## 2023-09-13 DIAGNOSIS — I51.89 COMBINED SYSTOLIC AND DIASTOLIC CARDIAC DYSFUNCTION: ICD-10-CM

## 2023-09-13 LAB
ALBUMIN SERPL BCP-MCNC: 2.2 G/DL (ref 3.5–5.2)
ALP SERPL-CCNC: 279 U/L (ref 55–135)
ALT SERPL W/O P-5'-P-CCNC: 39 U/L (ref 10–44)
ANION GAP SERPL CALC-SCNC: 13 MMOL/L (ref 8–16)
AST SERPL-CCNC: 45 U/L (ref 10–40)
BASOPHILS # BLD AUTO: 0.06 K/UL (ref 0–0.2)
BASOPHILS NFR BLD: 1.4 % (ref 0–1.9)
BILIRUB SERPL-MCNC: 0.7 MG/DL (ref 0.1–1)
BUN SERPL-MCNC: 31 MG/DL (ref 6–20)
CALCIUM SERPL-MCNC: 7.8 MG/DL (ref 8.7–10.5)
CHLORIDE SERPL-SCNC: 108 MMOL/L (ref 95–110)
CO2 SERPL-SCNC: 20 MMOL/L (ref 23–29)
CREAT SERPL-MCNC: 4.5 MG/DL (ref 0.5–1.4)
DIFFERENTIAL METHOD: ABNORMAL
EOSINOPHIL # BLD AUTO: 0.2 K/UL (ref 0–0.5)
EOSINOPHIL NFR BLD: 4.4 % (ref 0–8)
ERYTHROCYTE [DISTWIDTH] IN BLOOD BY AUTOMATED COUNT: 21.2 % (ref 11.5–14.5)
EST. GFR  (NO RACE VARIABLE): 10.7 ML/MIN/1.73 M^2
GLUCOSE SERPL-MCNC: 72 MG/DL (ref 70–110)
HCT VFR BLD AUTO: 37.2 % (ref 37–48.5)
HGB BLD-MCNC: 11.5 G/DL (ref 12–16)
IMM GRANULOCYTES # BLD AUTO: 0.01 K/UL (ref 0–0.04)
IMM GRANULOCYTES NFR BLD AUTO: 0.2 % (ref 0–0.5)
LYMPHOCYTES # BLD AUTO: 1.2 K/UL (ref 1–4.8)
LYMPHOCYTES NFR BLD: 28.1 % (ref 18–48)
MCH RBC QN AUTO: 34.5 PG (ref 27–31)
MCHC RBC AUTO-ENTMCNC: 30.9 G/DL (ref 32–36)
MCV RBC AUTO: 112 FL (ref 82–98)
MONOCYTES # BLD AUTO: 0.4 K/UL (ref 0.3–1)
MONOCYTES NFR BLD: 9.4 % (ref 4–15)
NEUTROPHILS # BLD AUTO: 2.5 K/UL (ref 1.8–7.7)
NEUTROPHILS NFR BLD: 56.5 % (ref 38–73)
NRBC BLD-RTO: 2 /100 WBC
PLATELET # BLD AUTO: 75 K/UL (ref 150–450)
PMV BLD AUTO: 12.7 FL (ref 9.2–12.9)
POTASSIUM SERPL-SCNC: 3.4 MMOL/L (ref 3.5–5.1)
PROT SERPL-MCNC: 5.5 G/DL (ref 6–8.4)
RBC # BLD AUTO: 3.33 M/UL (ref 4–5.4)
SODIUM SERPL-SCNC: 141 MMOL/L (ref 136–145)
T4 FREE SERPL-MCNC: 0.77 NG/DL (ref 0.71–1.51)
TSH SERPL DL<=0.005 MIU/L-ACNC: 22.06 UIU/ML (ref 0.4–4)
WBC # BLD AUTO: 4.34 K/UL (ref 3.9–12.7)

## 2023-09-13 PROCEDURE — 80053 COMPREHEN METABOLIC PANEL: CPT | Performed by: INTERNAL MEDICINE

## 2023-09-13 PROCEDURE — 84443 ASSAY THYROID STIM HORMONE: CPT | Performed by: INTERNAL MEDICINE

## 2023-09-13 PROCEDURE — 85025 COMPLETE CBC W/AUTO DIFF WBC: CPT | Performed by: INTERNAL MEDICINE

## 2023-09-13 PROCEDURE — 99999 PR PBB SHADOW E&M-EST. PATIENT-LVL IV: ICD-10-PCS | Mod: PBBFAC,,, | Performed by: NURSE PRACTITIONER

## 2023-09-13 PROCEDURE — 36415 COLL VENOUS BLD VENIPUNCTURE: CPT | Performed by: INTERNAL MEDICINE

## 2023-09-13 PROCEDURE — 99999 PR PBB SHADOW E&M-EST. PATIENT-LVL IV: CPT | Mod: PBBFAC,,, | Performed by: NURSE PRACTITIONER

## 2023-09-13 PROCEDURE — 84439 ASSAY OF FREE THYROXINE: CPT | Performed by: INTERNAL MEDICINE

## 2023-09-13 PROCEDURE — 99214 OFFICE O/P EST MOD 30 MIN: CPT | Mod: PBBFAC | Performed by: NURSE PRACTITIONER

## 2023-09-13 RX ORDER — CYCLOBENZAPRINE HCL 5 MG
5 TABLET ORAL
Qty: 30 TABLET | Refills: 1 | Status: SHIPPED | OUTPATIENT
Start: 2023-09-13

## 2023-09-13 RX ORDER — LANTHANUM CARBONATE 1000 MG/1
1 TABLET, CHEWABLE ORAL
COMMUNITY
Start: 2023-08-25

## 2023-09-13 NOTE — PROGRESS NOTES
"  Subjective:       Patient ID: Eva Bloom is a 57 y.o. female.    Chief Complaint: RCC    HPI     57 y.o.female, patient of Dr. Rivera, to clinic for 2 month follow up. Patient is currently on Cabo since December 2016.  Pt is on ESRD with HD every TTS. She has had two hospitalizations since last time she weas seen in clinic. +weak and fatigue doing HH PT twice weekly. Weight down 4 lbs,  states she recently started eating a little more. No more diarrhea. Patient was previously taking synthroid but  had been holding it as he thought it was making her sleepy.       Oncologic History (From Chart and Patient):  Eva Bloom is a 57 y.o.female with ESRD on HD who was found to have two L renal masses. She underwent L lap nephrectomy on 1/12/16. 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 Margins were negative.  Shehealed from surgery well, but then developed a large ovarian mass.  This was removed by gyn along with multiple sites of metastatic disease in the pelvis.  Path was c/w recurrence of RCC.     11/20/16 Pelvic ultrasound reveals "Large heterogeneous cystic and solid mass which appears to arise from the right ovary with worrisome imaging features for malignancy.  Differential diagnosis includes malignant tumors such as serous or mucinous cystadenocarcinoma.  It is important to note that the patient is at risk for ovarian torsion due to the size of the mass.Multiple uterine fibroids are identified with the largest in the uterine fundus."    11/22/16 pathology reveals "FINAL PATHOLOGIC DIAGNOSIS-RIGHT OVARY AND FALLOPIAN TUBE, RIGHT SALPINGO-OOPHORECTOMY:  -Positive for malignancy, high grade carcinoma morphologically and immunohistochemically consistent with metastasis from the patient's known renal cell carcinoma"    Review of Systems   Constitutional:  Positive for " fatigue. Negative for activity change, appetite change, chills and fever.   HENT:  Negative for congestion, hearing loss, mouth sores, postnasal drip, sore throat, tinnitus and voice change.    Eyes:  Negative for pain and visual disturbance.   Respiratory:  Negative for cough, shortness of breath and wheezing.    Cardiovascular:  Negative for chest pain, palpitations and leg swelling.   Gastrointestinal:  Negative for abdominal pain, constipation, diarrhea, nausea and vomiting.   Endocrine: Negative for cold intolerance and heat intolerance.   Genitourinary:  Negative for difficulty urinating, dyspareunia, dysuria, frequency, menstrual problem, urgency, vaginal bleeding, vaginal discharge and vaginal pain.   Musculoskeletal:  Negative for arthralgias and myalgias.   Skin:  Negative for color change, rash and wound.   Allergic/Immunologic: Negative for environmental allergies and food allergies.   Neurological:  Negative for weakness, numbness and headaches.   Hematological:  Negative for adenopathy. Does not bruise/bleed easily.   Psychiatric/Behavioral:  Negative for agitation, confusion, hallucinations and sleep disturbance. The patient is not nervous/anxious.    All other systems reviewed and are negative.      Allergies:  Review of patient's allergies indicates:   Allergen Reactions    Allopurinol      Other reaction(s): abnormal transaminases       Medications:  Current Outpatient Medications   Medication Sig Dispense Refill    acetaminophen (TYLENOL) 500 MG tablet Take 500 mg by mouth every 6 (six) hours as needed for Pain.      apixaban (ELIQUIS) 2.5 mg Tab Take 1 tablet (2.5 mg total) by mouth 2 (two) times daily. 60 tablet 11    aspirin 81 MG Chew Take 1 tablet (81 mg total) by mouth once daily. 30 tablet 11    cabozantinib (CABOMETYX) 60 mg Tab TAKE ONE TABLET BY MOUTH ONCE DAILY AT THE SAME TIME ON AN EMPTY STOMACH AT LEAST 1 HOUR BEFORE OR 2 HOURS AFTER EATING. AVOID GRAPEFRUIT PRODUCTS 30 tablet 4     cloNIDine 0.3 mg/24 hr td ptwk (CATAPRES) 0.3 mg/24 hr Place 1 patch onto the skin every 7 days. 4 patch 11    ENTRESTO 49-51 mg per tablet TAKE 1 TABLET BY MOUTH TWICE A  tablet 3    hydrALAZINE (APRESOLINE) 100 MG tablet Take 1 tablet (100 mg total) by mouth every 12 (twelve) hours. 180 tablet 2    HYDROcodone-acetaminophen (NORCO) 5-325 mg per tablet Take 1 tablet by mouth every 6 (six) hours as needed for Pain. 10 tablet 0    levothyroxine (SYNTHROID) 75 MCG tablet Take 1 tablet (75 mcg total) by mouth once daily. 30 tablet 11    lidocaine-prilocaine (EMLA) cream APPLY ATLEAST 30 MINUTES BEFORE TREATMENT 3 TIMES A WEEK 30 g 11    metoprolol succinate (TOPROL-XL) 100 MG 24 hr tablet Take 1/2 tablet (50mg) once daily 90 tablet 3    mv,Ca,min-folic acid-vit K1 (ONE-A-DAY WOMEN'S 50 PLUS) 400-20 mcg Tab Take 1 tablet by mouth once daily.      naloxone (NARCAN) 1 mg/mL injection 2 mg (1 mg per nostril) by Nasal route as needed for opioid overdose; may repeat in 3 to 5 minutes if not effective. Call 911 2 mL 11    sacubitriL-valsartan (ENTRESTO) 24-26 mg per tablet Take 1 tablet by mouth 2 (two) times daily. 60 tablet 3     No current facility-administered medications for this visit.     Facility-Administered Medications Ordered in Other Visits   Medication Dose Route Frequency Provider Last Rate Last Admin    0.9%  NaCl infusion   Intravenous Continuous Soni Bhatti MD   New Bag at 07/21/23 1116       PMH:  Past Medical History:   Diagnosis Date    Anemia     Atrial fibrillation     Bronchitis 03/01/2017    Cancer 2016    kidney cancer    CHF (congestive heart failure), NYHA class II, chronic, systolic     CMV (cytomegalovirus) antibody positive     Encounter for blood transfusion     ESRD (end stage renal disease)     Essential hypertension 09/23/2015    H/O herpes simplex type 2 infection     Herpes simplex type 1 antibody positive     History of kidney cancer     s/p left nephrectomy 1/2016     Hyperparathyroidism, unspecified     Hyperuricemia without signs of inflammatory arthritis and tophaceous disease     Kidney stones     LGSIL (low grade squamous intraepithelial dysplasia)     Myocardiopathy 07/21/2017    Prediabetes     Proteinuria     Renal disorder     Thyroid nodule     Urate nephropathy        PSH:  Past Surgical History:   Procedure Laterality Date    BREAST CYST EXCISION      COLONOSCOPY N/A 11/12/2015    COLONOSCOPY N/A 3/12/2021    Procedure: COLONOSCOPY;  Surgeon: Brendon Lanier MD;  Location: [facility] ENDO;  Service: Endoscopy;  Laterality: N/A;  covid test 3/9, labs prior, prep instr mailed -ml    INSERTION OF BIVENTRICULAR IMPLANTABLE CARDIOVERTER-DEFIBRILLATOR (ICD)  04/2021    INSERTION OF TUNNELED CENTRAL VENOUS CATHETER (CVC) WITH SUBCUTANEOUS PORT N/A 1/25/2023    Procedure: INSERTION, DUAL LUMEN CATHETER WITH PORT, WITH IMAGING GUIDANCE;  Surgeon: FARIDEH Muñoz III, MD;  Location: [facility] OR [address] [place] [address];  Service: Peripheral Vascular;  Laterality: N/A;  possinble permcath placment    NEPHRECTOMY-LAPAROSCOPIC Left 01/12/2016    PERCUTANEOUS TRANSLUMINAL ANGIOPLASTY OF ARTERIOVENOUS FISTULA Left 4/19/2023    Procedure: PTA, AV FISTULA;  Surgeon: FARIDEH Muñoz III, MD;  Location: [facility] CATH LAB;  Service: Peripheral Vascular;  Laterality: Left;    PERITONEAL CATHETER INSERTION      Permacath insertion  01/12/2017    PLACEMENT OF ARTERIOVENOUS GRAFT  12/28/2022    Procedure: INSERTION, GRAFT, ARTERIOVENOUS;  Surgeon: FARIDEH Muñoz III, MD;  [facility];  Service: Peripheral Vascular;;    REMOVAL, GRAFT, ARTERIOVENOUS, UPPER EXTREMITY Left 1/25/2023    Procedure: REMOVAL, GRAFT, ARTERIOVENOUS, UPPER EXTREMITY;  Surgeon: FARIDEH Muñoz III, MD;  [facility];  Service: Peripheral Vascular;  Laterality: Left;    REVISION OF ARTERIOVENOUS FISTULA Left 5/1/2023    Procedure: REVISION, AV FISTULA;  Surgeon: FARIDEH Muñoz III, MD;  [facility];   Service: Peripheral Vascular;  Laterality: Left;  LUE AVG revision    REVISION OF PROCEDURE INVOLVING ARTERIOVENOUS GRAFT Left 12/28/2022    Procedure: REVISION, PROCEDURE INVOLVING ARTERIOVENOUS GRAFT;  Surgeon: FARIDEH Muñoz III, MD;  Location: [facility] OR [address] [place] [address];  Service: Peripheral Vascular;  Laterality: Left;    REVISION OF PROCEDURE INVOLVING ARTERIOVENOUS GRAFT Left 7/21/2023    Procedure: LEFT ARM ARTERIOVENOUS GRAFT EXCISION  AND LIGATION;  Surgeon: Obi Werner MD;  Location: [facility];  Service: Vascular;  Laterality: Left;  Left AVG excision and revision with interposition    SALPINGOOPHORECTOMY Right 2016    KJB---DAVINCI    TONSILLECTOMY      TUBAL LIGATION         FamHx:  Family History   Problem Relation Age of Onset    Hypertension Mother     Diabetes Father     Kidney disease Father     No Known Problems Sister     Heart disease Sister     No Known Problems Sister     No Known Problems Brother     No Known Problems Brother     Cataracts Maternal Aunt     No Known Problems Maternal Uncle     No Known Problems Paternal Aunt     No Known Problems Paternal Uncle     No Known Problems Maternal Grandmother     Diabetes Maternal Grandfather     No Known Problems Paternal Grandmother     No Known Problems Paternal Grandfather     No Known Problems Son     No Known Problems Son     No Known Problems Other     Breast cancer Neg Hx     Colon cancer Neg Hx     Cancer Neg Hx     Stroke Neg Hx     Amblyopia Neg Hx     Blindness Neg Hx     Glaucoma Neg Hx     Macular degeneration Neg Hx     Retinal detachment Neg Hx     Strabismus Neg Hx     Thyroid disease Neg Hx        SocHx:  Social History     Socioeconomic History    Marital status:     Number of children: 2   Occupational History    Occupation: lucero     Employer: [id number]   Tobacco Use    Smoking status: Never     Passive exposure: Never    Smokeless tobacco: Never   Substance and Sexual Activity    Alcohol use: No    Drug  use: Yes     Types: Hydrocodone    Sexual activity: Not Currently     Social Determinants of Health     Financial Resource Strain: Low Risk  (5/23/2022)    Overall Financial Resource Strain (CARDIA)     Difficulty of Paying Living Expenses: Not hard at all   Food Insecurity: No Food Insecurity (5/23/2022)    Hunger Vital Sign     Worried About Running Out of Food in the Last Year: Never true     Ran Out of Food in the Last Year: Never true   Transportation Needs: No Transportation Needs (5/23/2022)    PRAPARE - Transportation     Lack of Transportation (Medical): No     Lack of Transportation (Non-Medical): No   Physical Activity: Insufficiently Active (5/23/2022)    Exercise Vital Sign     Days of Exercise per Week: 3 days     Minutes of Exercise per Session: 30 min   Stress: No Stress Concern Present (5/23/2022)    [place] [employer] - Occupational Stress Questionnaire     Feeling of Stress : Not at all   Social Connections: Socially Integrated (5/23/2022)    Social Connection and Isolation Panel [NHANES]     Frequency of Communication with Friends and Family: More than three times a week     Frequency of Social Gatherings with Friends and Family: Once a week     Attends Christianity Services: 1 to 4 times per year     Active Member of Clubs or Organizations: Yes     Attends Club or Organization Meetings: 1 to 4 times per year     Marital Status:    Housing Stability: Low Risk  (5/23/2022)    Housing Stability Vital Sign     Unable to Pay for Housing in the Last Year: No     Number of Places Lived in the Last Year: 1     Unstable Housing in the Last Year: No         Objective:     There were no vitals filed for this visit.        Physical Exam  Vitals and nursing note reviewed.   Constitutional:       General: She is not in acute distress.     Appearance: She is well-developed.      Comments: Thin appearance   HENT:      Head: Normocephalic and atraumatic.      Nose: Nose normal.       Mouth/Throat:      Pharynx: No oropharyngeal exudate.   Eyes:      General:         Right eye: No discharge.         Left eye: No discharge.      Conjunctiva/sclera: Conjunctivae normal.      Pupils: Pupils are equal, round, and reactive to light.   Neck:      Trachea: No tracheal deviation.   Cardiovascular:      Comments: No swelling to bilateral lower extremities.   Musculoskeletal:         General: No tenderness. Normal range of motion.      Comments:      Skin:     General: Skin is warm and dry.      Coloration: Skin is not pale.      Findings: No erythema or rash.   Neurological:      Mental Status: She is alert and oriented to person, place, and time.   Psychiatric:         Behavior: Behavior normal.         Thought Content: Thought content normal.        LABS:  WBC   Date Value Ref Range Status   09/13/2023 4.34 3.90 - 12.70 K/uL Final     Hemoglobin   Date Value Ref Range Status   09/13/2023 11.5 (L) 12.0 - 16.0 g/dL Final     Hematocrit   Date Value Ref Range Status   09/13/2023 37.2 37.0 - 48.5 % Final     Platelets   Date Value Ref Range Status   09/13/2023 75 (L) 150 - 450 K/uL Final       Chemistry        Component Value Date/Time     08/26/2023 0605    K 4.4 08/26/2023 0605     08/26/2023 0605    CO2 17 (L) 08/26/2023 0605    BUN 40 (H) 08/26/2023 0605    CREATININE 4.5 (H) 08/26/2023 0605    GLU 78 08/26/2023 0605        Component Value Date/Time    CALCIUM 8.2 (L) 08/26/2023 0605    ALKPHOS 334 (H) 08/25/2023 1108    AST 41 (H) 08/25/2023 1108    ALT 36 08/25/2023 1108    BILITOT 0.7 08/25/2023 1108        CV Ultrasound doppler arterial leg left  Left lower extremity arterial ultrasound shows decreased flow in common   femoral artery indicating possible proximal hemodynamically significant   stenosis with approximately 50% AT stenosis.  Ankle pressures are   non-compressible indicating possible medial calcinosis.      Assessment:       1. Metastatic renal cell carcinoma to  intra-abdominal site    2. Metastatic renal cell carcinoma, left    3. S/p nephrectomy left    4. Anemia due to end stage renal disease    5. ESRD (end stage renal disease) on dialysis    6. Hypothyroidism, unspecified type    7. Combined systolic and diastolic cardiac dysfunction    8. Hypertension, renal disease            Plan:    Cancer Staging   Renal cell carcinoma of left kidney  Staging form: Kidney, AJCC 8th Edition  - Clinical stage from 11/22/2016: Stage IV (rcTX, cN0, pM1) - Signed by Liset Ngo NP on 9/13/2023  - Pathologic stage from 1/12/2016: Stage Unknown (pT1b(2), pNX, cM0) - Unsigned    It is unlikely that surgery removed all sites of metastatic disease. Given that this is c/w her previous papillary disease, pt started on oral cabozantinib 60mg daily (for dual VEGF and MET inhibition), especially given data that shows cabo is far superior to Sutent in the first line setting.  If pt progresses on this regimen, will switch to nivolumab.     Labs largely stable. Continue Cabo.    4,5- on dialysis T,TR,Sat.    6- TSh elevated, resume synthroid.    7,8- following with cards. Advised to hold hydralazine and follow up with cards.    9- Lower than normal for patient. Will monitor.    RTC in 8 wks with CT CAP,labs (CBC,CMP, TSH), and to see Dr. Rivera in the morning.  ALL appts MUST be on a Monday, Wednesday, or Friday ONLY (pt has dialysis on Tuesday and Thursday).  Pt prefers early morning appts.       Patient is in agreement with the proposed treatment plan. All questions were answered to the patient's satisfaction. Pt knows to call clinic if anything is needed before the next clinic visit.      Liset Ngo, MSN, APRN, FNP-C  Hematology and Medical Oncology  Manager- [facility]  Nurse Practitioner/Clinical Investigator, [facility]  Nurse Practitioner to Dr. Ben Rivera    Route Chart for Scheduling    Med Onc Chart  Routing      Follow up with physician . RTC in 8 wks with CT CAP,labs (CBC,CMP, TSH), and to see Dr. Rivera in the morning.  ALL appts MUST be on a Monday, Wednesday, or Friday ONLY (pt has dialysis on Tuesday and Thursday).   Follow up with [facility]    Infusion scheduling note    Injection scheduling note    Labs    Imaging    Pharmacy appointment    Other referrals

## 2023-09-13 NOTE — Clinical Note
Good morning, I saw our mutual patient today. Her BP was running low in clinic likely due to losing weight. I asked her to hold her hydralazine and to schedule a follow up with you. Thanks!

## 2023-09-19 DIAGNOSIS — I48.0 PAF (PAROXYSMAL ATRIAL FIBRILLATION): ICD-10-CM

## 2023-09-19 NOTE — TELEPHONE ENCOUNTER
----- Message from Mikael Harris sent at 9/19/2023 10:39 AM CDT -----  Regarding:   Type: RX Refill Request     Who Called:      Have you contacted your pharmacy: Yes     Refill or New Rx: Reill      RX Name and Strength: apixaban (ELIQUIS) 2.5 mg Tab    Preferred Pharmacy with phone number:.  [facility]/pharm[facility] #[id number] - [place] - [address]  [address]  [place] [address]  Phone: [phone] Fax: [phone]     Local or Mail Order: Local     Ordering Provider: Dr. Sowmya Mccain     Would the patient rather a call back or a response via My [employer]?Callback      Best Call Back Number: [phone]      Additional Information: Pt has been without medication for 3 days now and needs medication refilled. Please call the pharmacy and the patient once done.

## 2023-09-20 ENCOUNTER — TELEPHONE (OUTPATIENT)
Dept: VASCULAR SURGERY | Facility: CLINIC | Age: 58
End: 2023-09-20
Payer: MEDICARE

## 2023-09-20 ENCOUNTER — HOSPITAL ENCOUNTER (EMERGENCY)
Facility: HOSPITAL | Age: 58
Discharge: HOME | End: 2023-09-20
Attending: EMERGENCY MEDICINE
Payer: MEDICARE

## 2023-09-20 VITALS
DIASTOLIC BLOOD PRESSURE: 95 MMHG | SYSTOLIC BLOOD PRESSURE: 131 MMHG | HEART RATE: 80 BPM | WEIGHT: 92 LBS | BODY MASS INDEX: 15.79 KG/M2 | OXYGEN SATURATION: 95 % | RESPIRATION RATE: 18 BRPM | TEMPERATURE: 98 F

## 2023-09-20 DIAGNOSIS — T81.30XA WOUND DEHISCENCE: Primary | ICD-10-CM

## 2023-09-20 PROBLEM — T82.7XXA ARTERIOVENOUS GRAFT INFECTION: Chronic | Status: ACTIVE | Noted: 2023-01-24

## 2023-09-20 PROCEDURE — 99281 EMR DPT VST MAYX REQ PHY/QHP: CPT

## 2023-09-20 NOTE — ED TRIAGE NOTES
Pt arrives to ED with an open wound to the left upper arm. Pt had her fistula removed a few weeks ago and then had her sutures removed a few days ago. Pt endorses SOB. Pt was advised to come to the ED by her home health nurse due to the opening of the fistula. Pt has been receiving dialysis through her port in her chest. Pt also has a wound noted to the middle toe of the LLE.

## 2023-09-20 NOTE — TELEPHONE ENCOUNTER
Contacted Elizabet in response to message. States she is at pt's home now and that pt's left upper arm graft site which has been previously excised and was healed at last visit is now open with slough to site. States she can see pulsing beneath slough and is concerned wound may become infected or that vessel may rupture. States pt does not remember her password for My Chart and cannot send picture of wound to staff. Nurse recommended pt report to ED at [facility] in light of pt's history of wound infection and difficulty healing wound. Elizabet verbalized understanding and states she will have pt report to ED. ----- Message from Esther Richey sent at 9/20/2023 11:36 AM CDT -----  Regarding: Pt advice for open wound  Contact: Elizabet @ [phone]  Elizabet a Home Health Nurse with [facility] states pt  has an open wound above fistula states it was closed.Measurements  are  2.6 by 1 by.3 in depth it also has slough on it. Please c/b to advise

## 2023-09-20 NOTE — ED PROVIDER NOTES
Encounter Date: 9/20/2023       History     Chief Complaint   Patient presents with    Post-op Problem     Fistula wound opened after sx. Told to come to ED by MD. Using a port-cath for dialysis.     HPI    57-year-old female who presents with surgical complication and wound dehiscence.  She has a past medical history of atrial fibrillation, CHF, end-stage renal disease on dialysis, AFib on Eliquis.  She was just admitted from 08/25 to 8/26 for shortness of breath and need for dialysis.  She had 2.5 L removed and felt much better.  She also has a recent diagnosis of 2 left renal masses.  She underwent left laparoscopic nephrectomy on 01/12/2016 and path consistent with renal cell carcinoma.  History obtained from the patient's family member.  He reports that in July she was admitted for a head infection of her left middle toe.  At that time she also had an infection of her left upper extremity fistula.  They removed it at that time and sutured the area.  She would sutures removed on 09/01.  For the last 2 days she is had some opening of the upper part of the wound.  No bleeding or discharge.  No pain.  No swelling of the arm, weakness or numbness.  No fevers or chills.  She denies any other complaints other than very mild baseline shortness of breath.  No chest pain, abdominal pain, vomiting, diarrhea, does not make urine.  [facility] saw the wound today and was concerned so called the vascular surgery office who told her to come into the emergency department for further evaluation.        Review of patient's allergies indicates:   Allergen Reactions    Coreg [carvedilol] Other (See Comments)     Nausea/vomiting    Allopurinol      Other reaction(s): abnormal transaminases     Past Medical History:   Diagnosis Date    Anemia     Atrial fibrillation     Bronchitis 03/01/2017    Cancer 2016    kidney cancer    CHF (congestive heart failure), NYHA class II, chronic, systolic     CMV (cytomegalovirus) antibody positive      Encounter for blood transfusion     ESRD (end stage renal disease)     Essential hypertension 09/23/2015    H/O herpes simplex type 2 infection     Herpes simplex type 1 antibody positive     History of kidney cancer     s/p left nephrectomy 1/2016    Hyperparathyroidism, unspecified     Hyperuricemia without signs of inflammatory arthritis and tophaceous disease     Kidney stones     LGSIL (low grade squamous intraepithelial dysplasia)     Myocardiopathy 07/21/2017    Prediabetes     Proteinuria     Renal disorder     Thyroid nodule     Urate nephropathy      Past Surgical History:   Procedure Laterality Date    BREAST CYST EXCISION      COLONOSCOPY N/A 11/12/2015    COLONOSCOPY N/A 3/12/2021    Procedure: COLONOSCOPY;  Surgeon: Brendon Lanier MD;  Location: [facility] ENDO;  Service: Endoscopy;  Laterality: N/A;  covid test 3/9, labs prior, prep instr mailed -ml    INSERTION OF BIVENTRICULAR IMPLANTABLE CARDIOVERTER-DEFIBRILLATOR (ICD)  04/2021    INSERTION OF TUNNELED CENTRAL VENOUS CATHETER (CVC) WITH SUBCUTANEOUS PORT N/A 1/25/2023    Procedure: INSERTION, DUAL LUMEN CATHETER WITH PORT, WITH IMAGING GUIDANCE;  Surgeon: FARIDEH Muñoz III, MD;  Location: [facility] OR [address] [place] [address];  Service: Peripheral Vascular;  Laterality: N/A;  possinble permcath placment    NEPHRECTOMY-LAPAROSCOPIC Left 01/12/2016    PERCUTANEOUS TRANSLUMINAL ANGIOPLASTY OF ARTERIOVENOUS FISTULA Left 4/19/2023    Procedure: PTA, AV FISTULA;  Surgeon: FARIDEH Muñoz III, MD;  Location: [facility] CATH LAB;  Service: Peripheral Vascular;  Laterality: Left;    PERITONEAL CATHETER INSERTION      Permacath insertion  01/12/2017    PLACEMENT OF ARTERIOVENOUS GRAFT  12/28/2022    Procedure: INSERTION, GRAFT, ARTERIOVENOUS;  Surgeon: FARIDEH Muñoz III, MD;  Location: [facility] OR [address] [place] [address];  Service: Peripheral Vascular;;    REMOVAL, GRAFT, ARTERIOVENOUS, UPPER EXTREMITY Left 1/25/2023    Procedure: REMOVAL, GRAFT, ARTERIOVENOUS, UPPER EXTREMITY;  Surgeon: FARIDEH CHEN  Toni SAM MD;  [facility];  Service: Peripheral Vascular;  Laterality: Left;    REVISION OF ARTERIOVENOUS FISTULA Left 5/1/2023    Procedure: REVISION, AV FISTULA;  Surgeon: FARIDEH Muñoz III, MD;  Location: [facility] OR [address] [place] [address];  Service: Peripheral Vascular;  Laterality: Left;  LUE AVG revision    REVISION OF PROCEDURE INVOLVING ARTERIOVENOUS GRAFT Left 12/28/2022    Procedure: REVISION, PROCEDURE INVOLVING ARTERIOVENOUS GRAFT;  Surgeon: FARIDEH Muñoz III, MD;  Location: [facility] OR [address] [place] [address];  Service: Peripheral Vascular;  Laterality: Left;    REVISION OF PROCEDURE INVOLVING ARTERIOVENOUS GRAFT Left 7/21/2023    Procedure: LEFT ARM ARTERIOVENOUS GRAFT EXCISION  AND LIGATION;  Surgeon: Obi Werner MD;  Location: [facility];  Service: Vascular;  Laterality: Left;  Left AVG excision and revision with interposition    SALPINGOOPHORECTOMY Right 2016    KJB---DAVINCI    TONSILLECTOMY      TUBAL LIGATION       Family History   Problem Relation Age of Onset    Hypertension Mother     Diabetes Father     Kidney disease Father     No Known Problems Sister     Heart disease Sister     No Known Problems Sister     No Known Problems Brother     No Known Problems Brother     Cataracts Maternal Aunt     No Known Problems Maternal Uncle     No Known Problems Paternal Aunt     No Known Problems Paternal Uncle     No Known Problems Maternal Grandmother     Diabetes Maternal Grandfather     No Known Problems Paternal Grandmother     No Known Problems Paternal Grandfather     No Known Problems Son     No Known Problems Son     No Known Problems Other     Breast cancer Neg Hx     Colon cancer Neg Hx     Cancer Neg Hx     Stroke Neg Hx     Amblyopia Neg Hx     Blindness Neg Hx     Glaucoma Neg Hx     Macular degeneration Neg Hx     Retinal detachment Neg Hx     Strabismus Neg Hx     Thyroid disease Neg Hx      Social History     Tobacco Use    Smoking status: Never     Passive exposure: Never    Smokeless  tobacco: Never   Substance Use Topics    Alcohol use: No    Drug use: Yes     Types: Hydrocodone     Review of Systems   Constitutional:  Negative for fever.   HENT:  Negative for sore throat.    Respiratory:  Negative for shortness of breath.    Cardiovascular:  Negative for chest pain.   Gastrointestinal:  Negative for nausea.   Genitourinary:  Negative for dysuria.   Musculoskeletal:  Negative for back pain.   Skin:  Positive for wound. Negative for rash.   Neurological:  Negative for weakness.   Hematological:  Does not bruise/bleed easily.   All other systems reviewed and are negative.      Physical Exam     Initial Vitals [09/20/23 1247]   BP Pulse Resp Temp SpO2   (!) 110/90 77 20 97.8 °F (36.6 °C) 97 %      MAP       --         Physical Exam    Nursing note and vitals reviewed.  Constitutional: She appears well-developed and well-nourished. No distress.   HENT:   Head: Normocephalic and atraumatic.   Nose: Nose normal.   Eyes: Conjunctivae and EOM are normal. Pupils are equal, round, and reactive to light.   Neck: Neck supple.   Normal range of motion.  Cardiovascular:  Normal rate, regular rhythm, normal heart sounds and intact distal pulses.     Exam reveals no gallop and no friction rub.       No murmur heard.  Pulmonary/Chest: Breath sounds normal. No respiratory distress. She has no wheezes. She has no rhonchi. She has no rales.   Abdominal: Abdomen is soft. She exhibits no distension. There is no abdominal tenderness. There is no rebound and no guarding.   Musculoskeletal:         General: No edema. Normal range of motion.      Cervical back: Normal range of motion and neck supple.     Neurological: She is alert and oriented to person, place, and time. GCS score is 15. GCS eye subscore is 4. GCS verbal subscore is 5. GCS motor subscore is 6.   Skin: Skin is warm and dry. Capillary refill takes less than 2 seconds.   2cm area of wound dehiscence at upper margins of L upper extremity fistula site. No  surrouding erythema or induration. See below   Psychiatric: She has a normal mood and affect.           ED Course   Procedures  Labs Reviewed - No data to display       Imaging Results    None          Medications - No data to display  Medical Decision Making  57-year-old female who presents with wound dehiscence at a site where her fistula was removed for infection back in July.  She is had 2 days where there is an open area.  About 2-3 cm in length.  No signs of surrounding infection.  Nontender.  Neurovascularly intact.  No other complaints at this time.  Was sent in by the vascular surgery office.  We will discuss with the vascular team regarding further management.      Seen by vascular surgery.  They have approved her for discharge and will follow up outpatient.  Please see their note for any procedures done.                               Clinical Impression:   Final diagnoses:  [T81.30XA] Wound dehiscence (Primary)        ED Disposition Condition    Discharge Stable          ED Prescriptions    None       Follow-up Information    None          Barbara Bass MD  09/20/23 2708

## 2023-09-20 NOTE — SUBJECTIVE & OBJECTIVE
(Not in a hospital admission)      Review of patient's allergies indicates:   Allergen Reactions    Coreg [carvedilol] Other (See Comments)     Nausea/vomiting    Allopurinol      Other reaction(s): abnormal transaminases       Past Medical History:   Diagnosis Date    Anemia     Atrial fibrillation     Bronchitis 03/01/2017    Cancer 2016    kidney cancer    CHF (congestive heart failure), NYHA class II, chronic, systolic     CMV (cytomegalovirus) antibody positive     Encounter for blood transfusion     ESRD (end stage renal disease)     Essential hypertension 09/23/2015    H/O herpes simplex type 2 infection     Herpes simplex type 1 antibody positive     History of kidney cancer     s/p left nephrectomy 1/2016    Hyperparathyroidism, unspecified     Hyperuricemia without signs of inflammatory arthritis and tophaceous disease     Kidney stones     LGSIL (low grade squamous intraepithelial dysplasia)     Myocardiopathy 07/21/2017    Prediabetes     Proteinuria     Renal disorder     Thyroid nodule     Urate nephropathy      Past Surgical History:   Procedure Laterality Date    BREAST CYST EXCISION      COLONOSCOPY N/A 11/12/2015    COLONOSCOPY N/A 3/12/2021    Procedure: COLONOSCOPY;  Surgeon: Brendon Lanier MD;  Location: [facility];  Service: Endoscopy;  Laterality: N/A;  covid test 3/9, labs prior, prep instr mailed -ml    INSERTION OF BIVENTRICULAR IMPLANTABLE CARDIOVERTER-DEFIBRILLATOR (ICD)  04/2021    INSERTION OF TUNNELED CENTRAL VENOUS CATHETER (CVC) WITH SUBCUTANEOUS PORT N/A 1/25/2023    Procedure: INSERTION, DUAL LUMEN CATHETER WITH PORT, WITH IMAGING GUIDANCE;  Surgeon: FARIDEH Muñoz III, MD;  Location: [facility] OR [address] [place] [address];  Service: Peripheral Vascular;  Laterality: N/A;  possinble permcath placment    NEPHRECTOMY-LAPAROSCOPIC Left 01/12/2016    PERCUTANEOUS TRANSLUMINAL ANGIOPLASTY OF ARTERIOVENOUS FISTULA Left 4/19/2023    Procedure: PTA, AV FISTULA;  Surgeon: FARIDEH Muñoz III, MD;  Location:  [facility] CATH LAB;  Service: Peripheral Vascular;  Laterality: Left;    PERITONEAL CATHETER INSERTION      Permacath insertion  01/12/2017    PLACEMENT OF ARTERIOVENOUS GRAFT  12/28/2022    Procedure: INSERTION, GRAFT, ARTERIOVENOUS;  Surgeon: FARIDEH Muñoz III, MD;  [facility];  Service: Peripheral Vascular;;    REMOVAL, GRAFT, ARTERIOVENOUS, UPPER EXTREMITY Left 1/25/2023    Procedure: REMOVAL, GRAFT, ARTERIOVENOUS, UPPER EXTREMITY;  Surgeon: FARIDEH Muñoz III, MD;  [facility];  Service: Peripheral Vascular;  Laterality: Left;    REVISION OF ARTERIOVENOUS FISTULA Left 5/1/2023    Procedure: REVISION, AV FISTULA;  Surgeon: FARIDEH Muñoz III, MD;  [facility];  Service: Peripheral Vascular;  Laterality: Left;  LUE AVG revision    REVISION OF PROCEDURE INVOLVING ARTERIOVENOUS GRAFT Left 12/28/2022    Procedure: REVISION, PROCEDURE INVOLVING ARTERIOVENOUS GRAFT;  Surgeon: FARIDEH Muñoz III, MD;  [facility];  Service: Peripheral Vascular;  Laterality: Left;    REVISION OF PROCEDURE INVOLVING ARTERIOVENOUS GRAFT Left 7/21/2023    Procedure: LEFT ARM ARTERIOVENOUS GRAFT EXCISION  AND LIGATION;  Surgeon: Obi Werner MD;  Location: [facility] OR;  Service: Vascular;  Laterality: Left;  Left AVG excision and revision with interposition    SALPINGOOPHORECTOMY Right 2016    KJB---DAVINCI    TONSILLECTOMY      TUBAL LIGATION       Family History       Problem Relation (Age of Onset)    Cataracts Maternal Aunt    Diabetes Father, Maternal Grandfather    Heart disease Sister    Hypertension Mother    Kidney disease Father    No Known Problems Sister, Sister, Brother, Brother, Maternal Uncle, Paternal Aunt, Paternal Uncle, Maternal Grandmother, Paternal Grandmother, Paternal Grandfather, Son, Son, Other          Tobacco Use    Smoking status: Never     Passive exposure: Never    Smokeless tobacco: Never   Substance and Sexual Activity    Alcohol use: No     Drug use: Yes     Types: Hydrocodone    Sexual activity: Not Currently     Review of Systems   Respiratory:          Mild baseline SOB   All other systems reviewed and are negative.    Objective:     Vital Signs (Most Recent):  Temp: 97.8 °F (36.6 °C) (09/20/23 1645)  Pulse: 85 (09/20/23 1645)  Resp: 18 (09/20/23 1645)  BP: (!) 133/91 (09/20/23 1645)  SpO2: 97 % (09/20/23 1645) Vital Signs (24h Range):  Temp:  [97.8 °F (36.6 °C)] 97.8 °F (36.6 °C)  Pulse:  [77-85] 85  Resp:  [18-20] 18  SpO2:  [97 %-99 %] 97 %  BP: (110-133)/(86-92) 133/91     Weight: 41.7 kg (92 lb)  Body mass index is 15.79 kg/m².      Physical Exam  Constitutional:       Appearance: Normal appearance.   HENT:      Head: Normocephalic and atraumatic.   Cardiovascular:      Rate and Rhythm: Normal rate.      Pulses: Normal pulses.   Pulmonary:      Effort: Pulmonary effort is normal.   Abdominal:      General: Abdomen is flat.      Palpations: Abdomen is soft.   Musculoskeletal:      Cervical back: Neck supple.   Skin:     General: Skin is warm and dry.      Comments: 2 to 3 cm area of surgical wound dehiscence at proximal portion of incision. Granulation tissue present  No erythema or edma   Neurological:      General: No focal deficit present.      Mental Status: She is alert and oriented to person, place, and time.   Psychiatric:         Mood and Affect: Mood normal.          Significant Labs:  All pertinent labs from the last 24 hours have been reviewed.    Significant Diagnostics:  I have reviewed all pertinent imaging results/findings within the past 24 hours.

## 2023-09-20 NOTE — ASSESSMENT & PLAN NOTE
Patient is a 56 yo female with a PMHx of ESRD requiring dialysis, afib on eliquis, and CHF who presented to the ED with a left arm surgical wound dehiscence s/p infected AV graft excision on 7/21/23.

## 2023-09-20 NOTE — HPI
Ms Bloom is a 57-year-old female with a PMHx of a fib on eliquis, CHF end stage renal disease requiring dialysis who presented to the ED with a left arm surgical wound dehiscence. She stated she first noticed her wound was opening two days ago. She is s/p excision of infected left AV graft on 7/21/23. Sutures were removed in clinic on 9/1/23. Patient denies any bleeding or discharge from the wound. Denies any pain, fevers, chills or swelling of the left arm. Patient has been dialyzing through her permacath.

## 2023-09-20 NOTE — ED NOTES
As discussed changes made to current medication plan to improve glucose control.  Stopped Glucotrol and lowered Actos to 30 mg which has been recommended max dose for some time.  Started new basal / bolus insulin plan follow handouts.  Sent for both regular meter and Margie device.      Diabetic education will contact you and put can either be done here or [facility] location.      Labs to be done at least 10 days before return appointment.  Labs either this [facility] lab or [facility] labs.  If labs are not done within 3 days of scheduled return appointment the appointment will be canceled and rescheduled to a later date with requirement for labs to be done as directed.      Bring med list, meter and/or log data to all appointments.       Pt provided with ice chips and sandwich per request.

## 2023-09-21 ENCOUNTER — NURSE TRIAGE (OUTPATIENT)
Dept: ADMINISTRATIVE | Facility: CLINIC | Age: 58
End: 2023-09-21
Payer: MEDICARE

## 2023-09-21 ENCOUNTER — OFFICE VISIT (OUTPATIENT)
Dept: INTERNAL MEDICINE | Facility: CLINIC | Age: 58
End: 2023-09-21
Payer: MEDICARE

## 2023-09-21 VITALS
RESPIRATION RATE: 16 BRPM | WEIGHT: 91.94 LBS | BODY MASS INDEX: 15.69 KG/M2 | SYSTOLIC BLOOD PRESSURE: 108 MMHG | HEART RATE: 70 BPM | DIASTOLIC BLOOD PRESSURE: 70 MMHG | OXYGEN SATURATION: 98 % | HEIGHT: 64 IN

## 2023-09-21 DIAGNOSIS — R49.0 HOARSENESS OF VOICE: ICD-10-CM

## 2023-09-21 DIAGNOSIS — H93.8X3 SENSATION OF FULLNESS IN BOTH EARS: ICD-10-CM

## 2023-09-21 PROCEDURE — 99215 OFFICE O/P EST HI 40 MIN: CPT | Mod: PBBFAC | Performed by: STUDENT IN AN ORGANIZED HEALTH CARE EDUCATION/TRAINING PROGRAM

## 2023-09-21 PROCEDURE — 99213 OFFICE O/P EST LOW 20 MIN: CPT | Mod: S$PBB,,, | Performed by: STUDENT IN AN ORGANIZED HEALTH CARE EDUCATION/TRAINING PROGRAM

## 2023-09-21 PROCEDURE — 99999 PR PBB SHADOW E&M-EST. PATIENT-LVL V: CPT | Mod: PBBFAC,,, | Performed by: STUDENT IN AN ORGANIZED HEALTH CARE EDUCATION/TRAINING PROGRAM

## 2023-09-21 PROCEDURE — 99213 PR OFFICE/OUTPT VISIT, EST, LEVL III, 20-29 MIN: ICD-10-PCS | Mod: S$PBB,,, | Performed by: STUDENT IN AN ORGANIZED HEALTH CARE EDUCATION/TRAINING PROGRAM

## 2023-09-21 PROCEDURE — 99999 PR PBB SHADOW E&M-EST. PATIENT-LVL V: ICD-10-PCS | Mod: PBBFAC,,, | Performed by: STUDENT IN AN ORGANIZED HEALTH CARE EDUCATION/TRAINING PROGRAM

## 2023-09-21 RX ORDER — FLUTICASONE PROPIONATE 50 MCG
1 SPRAY, SUSPENSION (ML) NASAL 2 TIMES DAILY PRN
Qty: 16 G | Refills: 2 | Status: ON HOLD | OUTPATIENT
Start: 2023-09-21 | End: 2024-01-05 | Stop reason: HOSPADM

## 2023-09-21 NOTE — ASSESSMENT & PLAN NOTE
Could be related to fluid behind the ears. No foreign object noted in the ear canal. TM looks good. Will try flonase to see if that helps.

## 2023-09-21 NOTE — DISCHARGE INSTRUCTIONS
Keep the wound clean and dry.  You can allow some water to run over but do not scrub or soak the area.  You can keep it covered with a nonadherent dressing or bandage.  Please follow up with the vascular neurology per their instructions.

## 2023-09-21 NOTE — TELEPHONE ENCOUNTER
"Spoke to patient's  to double check that it was ok for his appointment to be for today although it's a Thursday? CG stated "Yes", I'm going to bring her over there @ the main campus to get checked out.   "

## 2023-09-21 NOTE — TELEPHONE ENCOUNTER
Reason for Disposition   Hearing loss in one or both ears of sudden onset and present now    Additional Information   Negative: Followed an ear injury   Negative: Decreased hearing with nasal allergies   Negative: Part of a cold   Negative: Follows air travel or mountain driving   Negative: Earwax, questions about   Negative: Dizziness is the main symptom   Negative: Patient sounds very sick or weak to the triager   Negative: Ringing in the ears (tinnitus) and taking aspirin and dosage sounds high (i.e., > 1500 mg/day)    Protocols used: Hearing Loss-A-[place]  Pt'S Spouse states she has new onset bilateral hearing loss. Advised per Triage protocol to see a Healthcare Provider in the office. Appointment set for 3:20 pm. Instructed to call OOC back if symptoms worsen. Caller verbalized understanding.

## 2023-09-21 NOTE — PROGRESS NOTES
Subjective     Patient ID: Eva Bloom is a 57 y.o. female.    Chief Complaint: Otalgia and Laryngitis    Otalgia   Pertinent negatives include no abdominal pain, coughing, headaches, rhinorrhea, sore throat or vomiting.     Patient is a 58 yo female here for BL ear fullness and loss of voice. hoarseness has been on and off for a month. Left ear fullness since July after the surgery. They told her she had fluid in her ears and that should resolve but it didn't. Right ear fullness started this morning. She did not see anyone for these issues. Denies new medications or URI symptoms such as nasal congestion, runny nose, sore throat, fever, chills.   Review of Systems   Constitutional:  Negative for chills and fever.   HENT:  Positive for voice change. Negative for nasal congestion, ear pain, postnasal drip, rhinorrhea, sore throat and tinnitus.    Respiratory:  Positive for shortness of breath. Negative for cough and chest tightness.    Cardiovascular:  Negative for chest pain.   Gastrointestinal:  Negative for abdominal pain, nausea and vomiting.   Genitourinary:  Negative for dysuria and hematuria.   Neurological:  Negative for dizziness, vertigo, light-headedness and headaches.          Objective     Physical Exam  Vitals and nursing note reviewed.   Constitutional:       Appearance: Normal appearance.   HENT:      Right Ear: Tympanic membrane and ear canal normal.      Left Ear: Tympanic membrane and ear canal normal.      Mouth/Throat:      Mouth: Mucous membranes are moist.      Pharynx: Oropharynx is clear. No oropharyngeal exudate or posterior oropharyngeal erythema.   Eyes:      Conjunctiva/sclera: Conjunctivae normal.   Cardiovascular:      Rate and Rhythm: Normal rate and regular rhythm.      Heart sounds: Normal heart sounds.   Pulmonary:      Effort: Pulmonary effort is normal. No respiratory distress.      Breath sounds: Normal breath sounds.   Musculoskeletal:      Right lower leg: No edema.       Left lower leg: No edema.   Skin:     General: Skin is warm.   Neurological:      Mental Status: She is alert.            Assessment and Plan     1. Hoarseness of voice  Assessment & Plan:  On and off for the last month. Did not see anything significant on examination. Will refer to ENT    Orders:  -     Ambulatory referral/consult to ENT; Future; Expected date: 09/22/2023    2. Sensation of fullness in both ears  Assessment & Plan:  Could be related to fluid behind the ears. No foreign object noted in the ear canal. TM looks good. Will try flonase to see if that helps.       Other orders  -     fluticasone propionate (FLONASE) 50 mcg/actuation nasal spray; 1 spray (50 mcg total) by Each Nostril route 2 (two) times daily as needed (ear fullness).  Dispense: 16 g; Refill: 2

## 2023-09-22 ENCOUNTER — OFFICE VISIT (OUTPATIENT)
Dept: VASCULAR SURGERY | Facility: CLINIC | Age: 58
End: 2023-09-22
Payer: MEDICARE

## 2023-09-22 VITALS
WEIGHT: 90.38 LBS | BODY MASS INDEX: 15.43 KG/M2 | HEIGHT: 64 IN | DIASTOLIC BLOOD PRESSURE: 83 MMHG | SYSTOLIC BLOOD PRESSURE: 120 MMHG | TEMPERATURE: 98 F | HEART RATE: 68 BPM

## 2023-09-22 DIAGNOSIS — T82.7XXA INFECTION OF AV GRAFT FOR DIALYSIS: Primary | ICD-10-CM

## 2023-09-22 DIAGNOSIS — T82.590D MALFUNCTION OF ARTERIOVENOUS DIALYSIS FISTULA, SUBSEQUENT ENCOUNTER: Primary | ICD-10-CM

## 2023-09-22 PROCEDURE — 99999 PR PBB SHADOW E&M-EST. PATIENT-LVL IV: ICD-10-PCS | Mod: PBBFAC,,, | Performed by: SURGERY

## 2023-09-22 PROCEDURE — 99024 POSTOP FOLLOW-UP VISIT: CPT | Mod: POP,,, | Performed by: SURGERY

## 2023-09-22 PROCEDURE — 99999 PR PBB SHADOW E&M-EST. PATIENT-LVL IV: CPT | Mod: PBBFAC,,, | Performed by: SURGERY

## 2023-09-22 PROCEDURE — 99214 OFFICE O/P EST MOD 30 MIN: CPT | Mod: PBBFAC | Performed by: SURGERY

## 2023-09-22 PROCEDURE — 99024 PR POST-OP FOLLOW-UP VISIT: ICD-10-PCS | Mod: POP,,, | Performed by: SURGERY

## 2023-09-22 NOTE — H&P (VIEW-ONLY)
VASCULAR SURGERY SERVICE    CHIEF COMPLAINT:  Functional left AV graft dysfunctional left AV graft    HISTORY OF PRESENT ILLNESS: Eva Bloom is a 57 y.o. female end-stage renal disease, with multiple serious medical comorbidities including admission for sepsis 2 months ago, EF 20%, AFib on Eliquis, who is having increasing bleeding after her dialysis runs.  I placed a revision, left AV graft with long interposition Accu Seal 12/28/2022.  She has had uninterrupted use of this graft ever since.    Recent weeks she is had increased bleeding after dialysis sticks    S/p:    1aaa.  Excision, left AV graft for infection (07/21/2023, Dr. Werner)  1aa. Reclosure, L AVG conter-incision 5/1/2023  1a. PTA, L AVG 4/19/2023  Excision, excluded L AVG and permacath 1/25/2023  revision, left AV graft with long interposition Accu Seal 12/28/2022  Original left AV graft 2017 with subsequent covered stent placement in venous outflow    09/1/2023:  She now returns after excision of the left AV graft for infection proximally 5 weeks ago.  She is been dialyzing through a PermCath.    09/22/2023:  Now returns with breakdown of prior counter incision for graft excision.  Notably, she has metastatic renal cell carcinoma to the lungs        Past Medical History:   Diagnosis Date    Anemia     Atrial fibrillation     Bronchitis 03/01/2017    Cancer 2016    kidney cancer    CHF (congestive heart failure), NYHA class II, chronic, systolic     CMV (cytomegalovirus) antibody positive     Encounter for blood transfusion     ESRD (end stage renal disease)     Essential hypertension 09/23/2015    H/O herpes simplex type 2 infection     Herpes simplex type 1 antibody positive     History of kidney cancer     s/p left nephrectomy 1/2016    Hyperparathyroidism, unspecified     Hyperuricemia without signs of inflammatory arthritis and tophaceous disease     Kidney stones     LGSIL (low grade squamous intraepithelial dysplasia)      Myocardiopathy 07/21/2017    Prediabetes     Proteinuria     Renal disorder     Thyroid nodule     Urate nephropathy        Past Surgical History:   Procedure Laterality Date    BREAST CYST EXCISION      COLONOSCOPY N/A 11/12/2015    COLONOSCOPY N/A 3/12/2021    Procedure: COLONOSCOPY;  Surgeon: Brendon Lanier MD;  Location: [facility] ENDO;  Service: Endoscopy;  Laterality: N/A;  covid test 3/9, labs prior, prep instr mailed -ml    INSERTION OF BIVENTRICULAR IMPLANTABLE CARDIOVERTER-DEFIBRILLATOR (ICD)  04/2021    INSERTION OF TUNNELED CENTRAL VENOUS CATHETER (CVC) WITH SUBCUTANEOUS PORT N/A 1/25/2023    Procedure: INSERTION, DUAL LUMEN CATHETER WITH PORT, WITH IMAGING GUIDANCE;  Surgeon: FARIDEH Muñoz III, MD;  [facility];  Service: Peripheral Vascular;  Laterality: N/A;  possinble permcath placment    NEPHRECTOMY-LAPAROSCOPIC Left 01/12/2016    PERCUTANEOUS TRANSLUMINAL ANGIOPLASTY OF ARTERIOVENOUS FISTULA Left 4/19/2023    Procedure: PTA, AV FISTULA;  Surgeon: FARIDEH Muñoz III, MD;  Location: [facility] CATH LAB;  Service: Peripheral Vascular;  Laterality: Left;    PERITONEAL CATHETER INSERTION      Permacath insertion  01/12/2017    PLACEMENT OF ARTERIOVENOUS GRAFT  12/28/2022    Procedure: INSERTION, GRAFT, ARTERIOVENOUS;  Surgeon: FARIDEH Muñoz III, MD;  Location: [facility] OR [facility] FLR;  Service: Peripheral Vascular;;    REMOVAL, GRAFT, ARTERIOVENOUS, UPPER EXTREMITY Left 1/25/2023    Procedure: REMOVAL, GRAFT, ARTERIOVENOUS, UPPER EXTREMITY;  Surgeon: FARIDEH Muñoz III, MD;  Location: [facility] OR [facility] FLR;  Service: Peripheral Vascular;  Laterality: Left;    REVISION OF ARTERIOVENOUS FISTULA Left 5/1/2023    Procedure: REVISION, AV FISTULA;  Surgeon: FARIDEH Muñoz III, MD;  Location: [facility] OR 2ND FLR;  Service: Peripheral Vascular;  Laterality: Left;  LUE AVG revision    REVISION OF PROCEDURE INVOLVING ARTERIOVENOUS GRAFT Left 12/28/2022    Procedure: REVISION, PROCEDURE INVOLVING ARTERIOVENOUS GRAFT;   Surgeon: FARIDEH Muñoz III, MD;  [facility];  Service: Peripheral Vascular;  Laterality: Left;    REVISION OF PROCEDURE INVOLVING ARTERIOVENOUS GRAFT Left 7/21/2023    Procedure: LEFT ARM ARTERIOVENOUS GRAFT EXCISION  AND LIGATION;  Surgeon: Obi Werner MD;  Location: [facility] OR;  Service: Vascular;  Laterality: Left;  Left AVG excision and revision with interposition    SALPINGOOPHORECTOMY Right 2016    KJB---DAVINCI    TONSILLECTOMY      TUBAL LIGATION           Current Outpatient Medications:     acetaminophen (TYLENOL) 500 MG tablet, Take 500 mg by mouth every 6 (six) hours as needed for Pain., Disp: , Rfl:     apixaban (ELIQUIS) 2.5 mg Tab, Take 1 tablet (2.5 mg total) by mouth 2 (two) times daily., Disp: 60 tablet, Rfl: 11    aspirin 81 MG Chew, Take 1 tablet (81 mg total) by mouth once daily., Disp: 30 tablet, Rfl: 11    cabozantinib (CABOMETYX) 60 mg Tab, TAKE ONE TABLET BY MOUTH ONCE DAILY AT THE SAME TIME ON AN EMPTY STOMACH AT LEAST 1 HOUR BEFORE OR 2 HOURS AFTER EATING. AVOID GRAPEFRUIT PRODUCTS, Disp: 30 tablet, Rfl: 4    cloNIDine 0.3 mg/24 hr td ptwk (CATAPRES) 0.3 mg/24 hr, Place 1 patch onto the skin every 7 days., Disp: 4 patch, Rfl: 11    cyclobenzaprine (FLEXERIL) 5 MG tablet, TAKE 1 TABLET BY MOUTH DAILY AS NEEDED FOR MUSCLE SPASMS., Disp: 30 tablet, Rfl: 1    ENTRESTO 49-51 mg per tablet, TAKE 1 TABLET BY MOUTH TWICE A DAY, Disp: 180 tablet, Rfl: 3    epoetin david-epbx (RETACRIT INJ), Epoetin david - epbx (Retacrit), Disp: , Rfl:     fluticasone propionate (FLONASE) 50 mcg/actuation nasal spray, 1 spray (50 mcg total) by Each Nostril route 2 (two) times daily as needed (ear fullness)., Disp: 16 g, Rfl: 2    HYDROcodone-acetaminophen (NORCO) 5-325 mg per tablet, Take 1 tablet by mouth every 6 (six) hours as needed for Pain., Disp: 10 tablet, Rfl: 0    lanthanum (FOSRENOL) 1000 MG chewable tablet, Take 1 tablet by mouth., Disp: , Rfl:     levothyroxine (SYNTHROID) 75  MCG tablet, Take 1 tablet (75 mcg total) by mouth once daily., Disp: 30 tablet, Rfl: 11    lidocaine-prilocaine (EMLA) cream, APPLY ATLEAST 30 MINUTES BEFORE TREATMENT 3 TIMES A WEEK, Disp: 30 g, Rfl: 11    metoprolol succinate (TOPROL-XL) 100 MG 24 hr tablet, Take 1/2 tablet (50mg) once daily, Disp: 90 tablet, Rfl: 3    mv,Ca,min-folic acid-vit K1 (ONE-A-DAY WOMEN'S 50 PLUS) 400-20 mcg Tab, Take 1 tablet by mouth once daily., Disp: , Rfl:     naloxone (NARCAN) 1 mg/mL injection, 2 mg (1 mg per nostril) by Nasal route as needed for opioid overdose; may repeat in 3 to 5 minutes if not effective. Call 911, Disp: 2 mL, Rfl: 11    sacubitriL-valsartan (ENTRESTO) 24-26 mg per tablet, Take 1 tablet by mouth 2 (two) times daily., Disp: 60 tablet, Rfl: 3    hydrALAZINE (APRESOLINE) 100 MG tablet, Take 1 tablet (100 mg total) by mouth every 12 (twelve) hours., Disp: 180 tablet, Rfl: 2  No current facility-administered medications for this visit.    Facility-Administered Medications Ordered in Other Visits:     0.9%  NaCl infusion, , Intravenous, Continuous, Soni Bhatti MD, New Bag at 07/21/23 1116    Review of patient's allergies indicates:   Allergen Reactions    Coreg [carvedilol] Other (See Comments)     Nausea/vomiting    Allopurinol      Other reaction(s): abnormal transaminases       Family History   Problem Relation Age of Onset    Hypertension Mother     Diabetes Father     Kidney disease Father     No Known Problems Sister     Heart disease Sister     No Known Problems Sister     No Known Problems Brother     No Known Problems Brother     Cataracts Maternal Aunt     No Known Problems Maternal Uncle     No Known Problems Paternal Aunt     No Known Problems Paternal Uncle     No Known Problems Maternal Grandmother     Diabetes Maternal Grandfather     No Known Problems Paternal Grandmother     No Known Problems Paternal Grandfather     No Known Problems Son     No Known Problems Son     No Known Problems Other      "Breast cancer Neg Hx     Colon cancer Neg Hx     Cancer Neg Hx     Stroke Neg Hx     Amblyopia Neg Hx     Blindness Neg Hx     Glaucoma Neg Hx     Macular degeneration Neg Hx     Retinal detachment Neg Hx     Strabismus Neg Hx     Thyroid disease Neg Hx        Social History     Tobacco Use    Smoking status: Never     Passive exposure: Never    Smokeless tobacco: Never   Substance Use Topics    Alcohol use: No    Drug use: Yes     Types: Hydrocodone       PHYSICAL EXAM:   /83 (BP Location: Right arm, Patient Position: Sitting, BP Method: Large (Automatic))   Pulse 68   Temp 97.8 °F (36.6 °C) (Oral)   Ht 5' 4" (1.626 m)   Wt 41 kg (90 lb 6.2 oz)   LMP 10/24/2016   BMI 15.52 kg/m²   Constitutional:  She appears very frail and deconditioned   Neurological: Normal speech  no focal findings  CN II - XII grossly intact.    Psychiatric: Mood and affect appropriate and symmetric.   HEENT: Normocephalic / atraumatic  PERRLA  Midline trachea  No scars across the neck   Cardiac: Regular rate and rhythm.   Pulmonary: Normal pulmonary effort.   Abdomen: Soft, not distended.     Skin: Warm and well perfused.    Vascular:  Radial pulses are nonpalpable bilaterally.   Extremities/  Musculoskeletal: No edema.   Left arm demonstrates a open incision in the upper arm with chronically thrombosed graft extending to the venous anastomosis.  Feels as though she may have stents in the outflow tract as well.  There is no erythema or drainage       IMAGING:  Prior Vein mapping of the right arm shows completely inadequate vein for autogenous access      IMPRESSION:    Interval breakdown of prior surgical incision.  There is adjacent thrombosed prosthetic graft.  This will have to be removed  AFib on Eliquis  Severe systolic dysfunction EF 20%  Metastatic renal cell to lung    PLAN  Excision, left AV graft, 09/27/2023  Regional block    I have explained the risks, benefits and alternatives for this procedure in detail.  The " patient voices understanding and all questions have be answered, and agrees to proceed with the procedure.     MANPREET Muñoz III, MD, FACS  Professor and Chief, Vascular and Endovascular Surgery

## 2023-09-22 NOTE — PROGRESS NOTES
VASCULAR SURGERY SERVICE    CHIEF COMPLAINT:  Functional left AV graft dysfunctional left AV graft    HISTORY OF PRESENT ILLNESS: Eva Bloom is a 57 y.o. female end-stage renal disease, with multiple serious medical comorbidities including admission for sepsis 2 months ago, EF 20%, AFib on Eliquis, who is having increasing bleeding after her dialysis runs.  I placed a revision, left AV graft with long interposition Accu Seal 12/28/2022.  She has had uninterrupted use of this graft ever since.    Recent weeks she is had increased bleeding after dialysis sticks    S/p:    1aaa.  Excision, left AV graft for infection (07/21/2023, Dr. Werner)  1aa. Reclosure, L AVG conter-incision 5/1/2023  1a. PTA, L AVG 4/19/2023  Excision, excluded L AVG and permacath 1/25/2023  revision, left AV graft with long interposition Accu Seal 12/28/2022  Original left AV graft 2017 with subsequent covered stent placement in venous outflow    09/1/2023:  She now returns after excision of the left AV graft for infection proximally 5 weeks ago.  She is been dialyzing through a PermCath.    09/22/2023:  Now returns with breakdown of prior counter incision for graft excision.  Notably, she has metastatic renal cell carcinoma to the lungs        Past Medical History:   Diagnosis Date    Anemia     Atrial fibrillation     Bronchitis 03/01/2017    Cancer 2016    kidney cancer    CHF (congestive heart failure), NYHA class II, chronic, systolic     CMV (cytomegalovirus) antibody positive     Encounter for blood transfusion     ESRD (end stage renal disease)     Essential hypertension 09/23/2015    H/O herpes simplex type 2 infection     Herpes simplex type 1 antibody positive     History of kidney cancer     s/p left nephrectomy 1/2016    Hyperparathyroidism, unspecified     Hyperuricemia without signs of inflammatory arthritis and tophaceous disease     Kidney stones     LGSIL (low grade squamous intraepithelial dysplasia)      Myocardiopathy 07/21/2017    Prediabetes     Proteinuria     Renal disorder     Thyroid nodule     Urate nephropathy        Past Surgical History:   Procedure Laterality Date    BREAST CYST EXCISION      COLONOSCOPY N/A 11/12/2015    COLONOSCOPY N/A 3/12/2021    Procedure: COLONOSCOPY;  Surgeon: Brendon Lanier MD;  Location: [facility] ENDO;  Service: Endoscopy;  Laterality: N/A;  covid test 3/9, labs prior, prep instr mailed -ml    INSERTION OF BIVENTRICULAR IMPLANTABLE CARDIOVERTER-DEFIBRILLATOR (ICD)  04/2021    INSERTION OF TUNNELED CENTRAL VENOUS CATHETER (CVC) WITH SUBCUTANEOUS PORT N/A 1/25/2023    Procedure: INSERTION, DUAL LUMEN CATHETER WITH PORT, WITH IMAGING GUIDANCE;  Surgeon: FARIDEH Muñoz III, MD;  [facility];  Service: Peripheral Vascular;  Laterality: N/A;  possinble permcath placment    NEPHRECTOMY-LAPAROSCOPIC Left 01/12/2016    PERCUTANEOUS TRANSLUMINAL ANGIOPLASTY OF ARTERIOVENOUS FISTULA Left 4/19/2023    Procedure: PTA, AV FISTULA;  Surgeon: FARIDEH Muñoz III, MD;  Location: [facility] CATH LAB;  Service: Peripheral Vascular;  Laterality: Left;    PERITONEAL CATHETER INSERTION      Permacath insertion  01/12/2017    PLACEMENT OF ARTERIOVENOUS GRAFT  12/28/2022    Procedure: INSERTION, GRAFT, ARTERIOVENOUS;  Surgeon: FARIDEH Muñoz III, MD;  Location: [facility] OR [facility] FLR;  Service: Peripheral Vascular;;    REMOVAL, GRAFT, ARTERIOVENOUS, UPPER EXTREMITY Left 1/25/2023    Procedure: REMOVAL, GRAFT, ARTERIOVENOUS, UPPER EXTREMITY;  Surgeon: FARIDEH Muñoz III, MD;  Location: [facility] OR [facility] FLR;  Service: Peripheral Vascular;  Laterality: Left;    REVISION OF ARTERIOVENOUS FISTULA Left 5/1/2023    Procedure: REVISION, AV FISTULA;  Surgeon: FARIDEH Muñoz III, MD;  Location: [facility] OR 2ND FLR;  Service: Peripheral Vascular;  Laterality: Left;  LUE AVG revision    REVISION OF PROCEDURE INVOLVING ARTERIOVENOUS GRAFT Left 12/28/2022    Procedure: REVISION, PROCEDURE INVOLVING ARTERIOVENOUS GRAFT;   Surgeon: FARIDEH Muñoz III, MD;  [facility];  Service: Peripheral Vascular;  Laterality: Left;    REVISION OF PROCEDURE INVOLVING ARTERIOVENOUS GRAFT Left 7/21/2023    Procedure: LEFT ARM ARTERIOVENOUS GRAFT EXCISION  AND LIGATION;  Surgeon: Obi Werner MD;  Location: [facility] OR;  Service: Vascular;  Laterality: Left;  Left AVG excision and revision with interposition    SALPINGOOPHORECTOMY Right 2016    KJB---DAVINCI    TONSILLECTOMY      TUBAL LIGATION           Current Outpatient Medications:     acetaminophen (TYLENOL) 500 MG tablet, Take 500 mg by mouth every 6 (six) hours as needed for Pain., Disp: , Rfl:     apixaban (ELIQUIS) 2.5 mg Tab, Take 1 tablet (2.5 mg total) by mouth 2 (two) times daily., Disp: 60 tablet, Rfl: 11    aspirin 81 MG Chew, Take 1 tablet (81 mg total) by mouth once daily., Disp: 30 tablet, Rfl: 11    cabozantinib (CABOMETYX) 60 mg Tab, TAKE ONE TABLET BY MOUTH ONCE DAILY AT THE SAME TIME ON AN EMPTY STOMACH AT LEAST 1 HOUR BEFORE OR 2 HOURS AFTER EATING. AVOID GRAPEFRUIT PRODUCTS, Disp: 30 tablet, Rfl: 4    cloNIDine 0.3 mg/24 hr td ptwk (CATAPRES) 0.3 mg/24 hr, Place 1 patch onto the skin every 7 days., Disp: 4 patch, Rfl: 11    cyclobenzaprine (FLEXERIL) 5 MG tablet, TAKE 1 TABLET BY MOUTH DAILY AS NEEDED FOR MUSCLE SPASMS., Disp: 30 tablet, Rfl: 1    ENTRESTO 49-51 mg per tablet, TAKE 1 TABLET BY MOUTH TWICE A DAY, Disp: 180 tablet, Rfl: 3    epoetin david-epbx (RETACRIT INJ), Epoetin david - epbx (Retacrit), Disp: , Rfl:     fluticasone propionate (FLONASE) 50 mcg/actuation nasal spray, 1 spray (50 mcg total) by Each Nostril route 2 (two) times daily as needed (ear fullness)., Disp: 16 g, Rfl: 2    HYDROcodone-acetaminophen (NORCO) 5-325 mg per tablet, Take 1 tablet by mouth every 6 (six) hours as needed for Pain., Disp: 10 tablet, Rfl: 0    lanthanum (FOSRENOL) 1000 MG chewable tablet, Take 1 tablet by mouth., Disp: , Rfl:     levothyroxine (SYNTHROID) 75  MCG tablet, Take 1 tablet (75 mcg total) by mouth once daily., Disp: 30 tablet, Rfl: 11    lidocaine-prilocaine (EMLA) cream, APPLY ATLEAST 30 MINUTES BEFORE TREATMENT 3 TIMES A WEEK, Disp: 30 g, Rfl: 11    metoprolol succinate (TOPROL-XL) 100 MG 24 hr tablet, Take 1/2 tablet (50mg) once daily, Disp: 90 tablet, Rfl: 3    mv,Ca,min-folic acid-vit K1 (ONE-A-DAY WOMEN'S 50 PLUS) 400-20 mcg Tab, Take 1 tablet by mouth once daily., Disp: , Rfl:     naloxone (NARCAN) 1 mg/mL injection, 2 mg (1 mg per nostril) by Nasal route as needed for opioid overdose; may repeat in 3 to 5 minutes if not effective. Call 911, Disp: 2 mL, Rfl: 11    sacubitriL-valsartan (ENTRESTO) 24-26 mg per tablet, Take 1 tablet by mouth 2 (two) times daily., Disp: 60 tablet, Rfl: 3    hydrALAZINE (APRESOLINE) 100 MG tablet, Take 1 tablet (100 mg total) by mouth every 12 (twelve) hours., Disp: 180 tablet, Rfl: 2  No current facility-administered medications for this visit.    Facility-Administered Medications Ordered in Other Visits:     0.9%  NaCl infusion, , Intravenous, Continuous, Soni Bhatti MD, New Bag at 07/21/23 1116    Review of patient's allergies indicates:   Allergen Reactions    Coreg [carvedilol] Other (See Comments)     Nausea/vomiting    Allopurinol      Other reaction(s): abnormal transaminases       Family History   Problem Relation Age of Onset    Hypertension Mother     Diabetes Father     Kidney disease Father     No Known Problems Sister     Heart disease Sister     No Known Problems Sister     No Known Problems Brother     No Known Problems Brother     Cataracts Maternal Aunt     No Known Problems Maternal Uncle     No Known Problems Paternal Aunt     No Known Problems Paternal Uncle     No Known Problems Maternal Grandmother     Diabetes Maternal Grandfather     No Known Problems Paternal Grandmother     No Known Problems Paternal Grandfather     No Known Problems Son     No Known Problems Son     No Known Problems Other      "Breast cancer Neg Hx     Colon cancer Neg Hx     Cancer Neg Hx     Stroke Neg Hx     Amblyopia Neg Hx     Blindness Neg Hx     Glaucoma Neg Hx     Macular degeneration Neg Hx     Retinal detachment Neg Hx     Strabismus Neg Hx     Thyroid disease Neg Hx        Social History     Tobacco Use    Smoking status: Never     Passive exposure: Never    Smokeless tobacco: Never   Substance Use Topics    Alcohol use: No    Drug use: Yes     Types: Hydrocodone       PHYSICAL EXAM:   /83 (BP Location: Right arm, Patient Position: Sitting, BP Method: Large (Automatic))   Pulse 68   Temp 97.8 °F (36.6 °C) (Oral)   Ht 5' 4" (1.626 m)   Wt 41 kg (90 lb 6.2 oz)   LMP 10/24/2016   BMI 15.52 kg/m²   Constitutional:  She appears very frail and deconditioned   Neurological: Normal speech  no focal findings  CN II - XII grossly intact.    Psychiatric: Mood and affect appropriate and symmetric.   HEENT: Normocephalic / atraumatic  PERRLA  Midline trachea  No scars across the neck   Cardiac: Regular rate and rhythm.   Pulmonary: Normal pulmonary effort.   Abdomen: Soft, not distended.     Skin: Warm and well perfused.    Vascular:  Radial pulses are nonpalpable bilaterally.   Extremities/  Musculoskeletal: No edema.   Left arm demonstrates a open incision in the upper arm with chronically thrombosed graft extending to the venous anastomosis.  Feels as though she may have stents in the outflow tract as well.  There is no erythema or drainage       IMAGING:  Prior Vein mapping of the right arm shows completely inadequate vein for autogenous access      IMPRESSION:    Interval breakdown of prior surgical incision.  There is adjacent thrombosed prosthetic graft.  This will have to be removed  AFib on Eliquis  Severe systolic dysfunction EF 20%  Metastatic renal cell to lung    PLAN  Excision, left AV graft, 09/27/2023  Regional block    I have explained the risks, benefits and alternatives for this procedure in detail.  The " patient voices understanding and all questions have be answered, and agrees to proceed with the procedure.     MANPREET Muñoz III, MD, FACS  Professor and Chief, Vascular and Endovascular Surgery

## 2023-09-26 ENCOUNTER — ANESTHESIA EVENT (OUTPATIENT)
Dept: SURGERY | Facility: HOSPITAL | Age: 58
DRG: 252 | End: 2023-09-26
Payer: MEDICARE

## 2023-09-26 ENCOUNTER — TELEPHONE (OUTPATIENT)
Dept: VASCULAR SURGERY | Facility: CLINIC | Age: 58
End: 2023-09-26
Payer: MEDICARE

## 2023-09-26 NOTE — ANESTHESIA PREPROCEDURE EVALUATION
[facility]  Anesthesia Pre-Operative Evaluation         Patient Name: Eva Bloom  YOB: 1965  MRN: [id number]    SUBJECTIVE:     Pre-operative evaluation for Procedure(s) (LRB):  EXCISION, AV FISTULA (Left)     09/26/2023    Eva Bloom is a 57 y.o. female w/ a significant PMHx of end-stage renal disease, EF 20%, AFib on Eliquis. Of note pt had admission for sepsis 2 months ago. Left arm demonstrates a open incision in the upper arm with chronically thrombosed graft extending to the venous anastomosis    Patient now presents for the above procedure(s).    Echo Summary  Results for orders placed during the hospital encounter of 05/08/23    Echo Saline Bubble? No    Interpretation Summary  · The left ventricle is normal in size with mild eccentric hypertrophy and severely decreased systolic function.  · The estimated ejection fraction is 23%.  · There is severe left ventricular global hypokinesis.  · Grade II left ventricular diastolic dysfunction.  · Moderate left atrial enlargement.  · Normal right ventricular size with normal right ventricular systolic function.  · Moderate right atrial enlargement.  · There is mild aortic valve stenosis.  · Aortic valve area is 1.58 cm2; peak velocity is 2.01 m/s; mean gradient is 8 mmHg.  · Moderate mitral regurgitation.  · Moderate to severe tricuspid regurgitation.  · Mild pulmonic regurgitation.  · Elevated central venous pressure (15 mmHg).  · The estimated PA systolic pressure is 47 mmHg.  · There is mild-moderate pulmonary hypertension.  · Moderate posterolateral pericardial effusion. Trivial under the RA       Prev airway: None documented.    LDA:        Hemodialysis Catheter left subclavian (Active)   Number of days:             Hemodialysis Catheter 07/21/23 1638 left internal jugular (Active)   Number of days: 66       Permacath 01/25/23 0936 left subclavian (Active)   Number of days: 244            Hemodialysis AV Graft  04/19/23 1057 Left upper arm (Active)   Number of days: 160       Drips: None documented.      Patient Active Problem List   Diagnosis    Kidney stones    Hyperparathyroidism, secondary renal    Hyperuricemia without signs of inflammatory arthritis and tophaceous disease    Proteinuria    Urate nephropathy    Essential hypertension    Pap smear abnormality of cervix with ASCUS favoring benign    CMV (cytomegalovirus) antibody positive    Herpes simplex type 1 antibody positive    H/O herpes simplex type 2 infection    Multinodular goiter    Renal cell carcinoma of left kidney    History of kidney cancer    S/p nephrectomy left    Pre-op evaluation    Stenosis of arteriovenous dialysis fistula    Primary insomnia    Cardiomyopathy, nonischemic    Pulmonary hypertension    ESRD (end stage renal disease) on dialysis    Nonrheumatic mitral valve regurgitation    Chronic combined systolic and diastolic congestive heart failure    Anemia in ESRD (end-stage renal disease)    Chronic kidney disease-mineral and bone disorder    Hyperphosphatemia    ICD (implantable cardioverter-defibrillator) in place - SubQ    Hypercalcemia    Glucose intolerance    PAF (paroxysmal atrial fibrillation)    Transaminitis    Debility    Malfunction of arteriovenous dialysis fistula    Arteriovenous graft infection    Aortic atherosclerosis    Ischemic ulcer of toe of left foot, with unspecified severity    Hypothyroidism    Critical limb ischemia of left lower extremity with gangrene    Hemorrhoid    Metastatic renal cell carcinoma to lung, unspecified laterality    Acute on chronic combined systolic and diastolic heart failure    Hoarseness of voice    Sensation of fullness in both ears       Review of patient's allergies indicates:   Allergen Reactions    Coreg [carvedilol] Other (See Comments)     Nausea/vomiting    Allopurinol      Other reaction(s): abnormal transaminases       Current Inpatient  Medications:      Current Facility-Administered Medications on File Prior to Encounter   Medication Dose Route Frequency Provider Last Rate Last Admin    0.9%  NaCl infusion   Intravenous Continuous Soni Bhatti MD   New Bag at 07/21/23 1116     Current Outpatient Medications on File Prior to Encounter   Medication Sig Dispense Refill    acetaminophen (TYLENOL) 500 MG tablet Take 500 mg by mouth every 6 (six) hours as needed for Pain.      apixaban (ELIQUIS) 2.5 mg Tab Take 1 tablet (2.5 mg total) by mouth 2 (two) times daily. 60 tablet 11    aspirin 81 MG Chew Take 1 tablet (81 mg total) by mouth once daily. 30 tablet 11    cabozantinib (CABOMETYX) 60 mg Tab TAKE ONE TABLET BY MOUTH ONCE DAILY AT THE SAME TIME ON AN EMPTY STOMACH AT LEAST 1 HOUR BEFORE OR 2 HOURS AFTER EATING. AVOID GRAPEFRUIT PRODUCTS 30 tablet 4    cloNIDine 0.3 mg/24 hr td ptwk (CATAPRES) 0.3 mg/24 hr Place 1 patch onto the skin every 7 days. 4 patch 11    cyclobenzaprine (FLEXERIL) 5 MG tablet TAKE 1 TABLET BY MOUTH DAILY AS NEEDED FOR MUSCLE SPASMS. 30 tablet 1    ENTRESTO 49-51 mg per tablet TAKE 1 TABLET BY MOUTH TWICE A  tablet 3    epoetin david-epbx (RETACRIT INJ) Epoetin david - epbx (Retacrit)      fluticasone propionate (FLONASE) 50 mcg/actuation nasal spray 1 spray (50 mcg total) by Each Nostril route 2 (two) times daily as needed (ear fullness). 16 g 2    hydrALAZINE (APRESOLINE) 100 MG tablet Take 1 tablet (100 mg total) by mouth every 12 (twelve) hours. 180 tablet 2    HYDROcodone-acetaminophen (NORCO) 5-325 mg per tablet Take 1 tablet by mouth every 6 (six) hours as needed for Pain. 10 tablet 0    lanthanum (FOSRENOL) 1000 MG chewable tablet Take 1 tablet by mouth.      levothyroxine (SYNTHROID) 75 MCG tablet Take 1 tablet (75 mcg total) by mouth once daily. 30 tablet 11    lidocaine-prilocaine (EMLA) cream APPLY ATLEAST 30 MINUTES BEFORE TREATMENT 3 TIMES A WEEK 30 g 11    metoprolol succinate (TOPROL-XL)  100 MG 24 hr tablet Take 1/2 tablet (50mg) once daily 90 tablet 3    mv,Ca,min-folic acid-vit K1 (ONE-A-DAY WOMEN'S 50 PLUS) 400-20 mcg Tab Take 1 tablet by mouth once daily.      naloxone (NARCAN) 1 mg/mL injection 2 mg (1 mg per nostril) by Nasal route as needed for opioid overdose; may repeat in 3 to 5 minutes if not effective. Call 911 2 mL 11    sacubitriL-valsartan (ENTRESTO) 24-26 mg per tablet Take 1 tablet by mouth 2 (two) times daily. 60 tablet 3    [DISCONTINUED] amLODIPine (NORVASC) 5 MG tablet TAKE 1 TABLET BY MOUTH EVERY DAY 90 tablet 3       Past Surgical History:   Procedure Laterality Date    BREAST CYST EXCISION      COLONOSCOPY N/A 11/12/2015    COLONOSCOPY N/A 3/12/2021    Procedure: COLONOSCOPY;  Surgeon: Brendon Lanier MD;  Location: [facility] ENDO;  Service: Endoscopy;  Laterality: N/A;  covid test 3/9, labs prior, prep instr mailed -ml    INSERTION OF BIVENTRICULAR IMPLANTABLE CARDIOVERTER-DEFIBRILLATOR (ICD)  04/2021    INSERTION OF TUNNELED CENTRAL VENOUS CATHETER (CVC) WITH SUBCUTANEOUS PORT N/A 1/25/2023    Procedure: INSERTION, DUAL LUMEN CATHETER WITH PORT, WITH IMAGING GUIDANCE;  Surgeon: FARIDEH Muñoz III, MD;  Location: [facility] OR [address] [place] [address];  Service: Peripheral Vascular;  Laterality: N/A;  possinble permcath placment    NEPHRECTOMY-LAPAROSCOPIC Left 01/12/2016    PERCUTANEOUS TRANSLUMINAL ANGIOPLASTY OF ARTERIOVENOUS FISTULA Left 4/19/2023    Procedure: PTA, AV FISTULA;  Surgeon: FARIDEH Muñoz III, MD;  Location: [facility] CATH LAB;  Service: Peripheral Vascular;  Laterality: Left;    PERITONEAL CATHETER INSERTION      Permacath insertion  01/12/2017    PLACEMENT OF ARTERIOVENOUS GRAFT  12/28/2022    Procedure: INSERTION, GRAFT, ARTERIOVENOUS;  Surgeon: FARIDEH Muñoz III, MD;  Location: [facility] OR [address] [place] [address];  Service: Peripheral Vascular;;    REMOVAL, GRAFT, ARTERIOVENOUS, UPPER EXTREMITY Left 1/25/2023    Procedure: REMOVAL, GRAFT, ARTERIOVENOUS, UPPER EXTREMITY;  Surgeon: FARIDEH  GUSTAVO Muñoz III, MD;  Location: [facility] OR 2ND FLR;  Service: Peripheral Vascular;  Laterality: Left;    REVISION OF ARTERIOVENOUS FISTULA Left 5/1/2023    Procedure: REVISION, AV FISTULA;  Surgeon: FARIDEH Muñoz III, MD;  Location: [facility] OR 2ND FLR;  Service: Peripheral Vascular;  Laterality: Left;  LUE AVG revision    REVISION OF PROCEDURE INVOLVING ARTERIOVENOUS GRAFT Left 12/28/2022    Procedure: REVISION, PROCEDURE INVOLVING ARTERIOVENOUS GRAFT;  Surgeon: FARIDEH Muñoz III, MD;  [facility];  Service: Peripheral Vascular;  Laterality: Left;    REVISION OF PROCEDURE INVOLVING ARTERIOVENOUS GRAFT Left 7/21/2023    Procedure: LEFT ARM ARTERIOVENOUS GRAFT EXCISION  AND LIGATION;  Surgeon: Obi Werner MD;  Location: [facility] OR;  Service: Vascular;  Laterality: Left;  Left AVG excision and revision with interposition    SALPINGOOPHORECTOMY Right 2016    KJB---DAVINCI    TONSILLECTOMY      TUBAL LIGATION         Social History:  Tobacco Use: Low Risk  (9/22/2023)    Patient History     Smoking Tobacco Use: Never     Smokeless Tobacco Use: Never     Passive Exposure: Never      Alcohol Use: Not At Risk (5/23/2022)    AUDIT-C     Frequency of Alcohol Consumption: Never     Average Number of Drinks: Patient does not drink     Frequency of Binge Drinking: Never        OBJECTIVE:     Vital Signs Range (Last 24H):         Significant Labs:  Lab Results   Component Value Date    WBC 4.34 09/13/2023    HGB 11.5 (L) 09/13/2023    HCT 37.2 09/13/2023    PLT 75 (L) 09/13/2023    CHOL 101 (L) 10/22/2022    TRIG 97 10/22/2022    HDL 35 (L) 10/22/2022    ALT 39 09/13/2023    AST 45 (H) 09/13/2023     09/13/2023    K 3.4 (L) 09/13/2023     09/13/2023    CREATININE 4.5 (H) 09/13/2023    BUN 31 (H) 09/13/2023    CO2 20 (L) 09/13/2023    TSH 22.062 (H) 09/13/2023    INR 1.0 07/21/2023    HGBA1C 4.1 08/25/2023       Diagnostic Studies: No relevant studies.    EKG:   Results for orders  placed or performed in visit on 09/20/23   EKG 12-lead    Collection Time: 09/20/23  1:56 PM    Narrative    Test Reason :     Vent. Rate : 081 BPM     Atrial Rate : 081 BPM     P-R Int : 188 ms          QRS Dur : 102 ms      QT Int : 448 ms       P-R-T Axes : 083 100 085 degrees     QTc Int : 520 ms    Normal sinus rhythm  Possible Left atrial enlargement  Rightward axis  Nonspecific ST and T wave abnormality  Prolonged QT  Abnormal ECG  When compared with ECG of 25-AUG-2023 10:01,  Questionable change in The axis  T wave inversion no longer evident in Lateral leads  Confirmed by Tae IRAHETA MD ([id number]) on 9/20/2023 8:39:10 PM    Referred By: [email or web address]   SELF           Confirmed By:Tae IRAHETA MD       2D ECHO:  TTE:  Results for orders placed or performed during the hospital encounter of 05/08/23   Echo Saline Bubble? No   Result Value Ref Range    Ascending aorta 2.86 cm    STJ 2.41 cm    AV mean gradient 8 mmHg    Ao peak subhash 2.01 m/s    Ao VTI 32.09 cm    IVS 0.82 0.6 - 1.1 cm    LA size 4.00 cm    Left Atrium Major Axis 5.35 cm    Left Atrium Minor Axis 4.46 cm    LVIDd 5.54 3.5 - 6.0 cm    LVIDs 4.88 (A) 2.1 - 4.0 cm    LVOT diameter 2.20 cm    LVOT peak VTI 13.36 cm    Posterior Wall 0.93 0.6 - 1.1 cm    MV Peak A Subhash 1.06 m/s    E wave deceleration time 174.27 msec    MV Peak E Subhash 1.25 m/s    RA Major Axis 5.50 cm    RA Width 4.59 cm    RVDD 5.55 cm    Sinus 2.76 cm    TAPSE 2.22 cm    TR Max Subhash 2.81 m/s    LA WIDTH 4.15 cm    LV Diastolic Volume 149.75 mL    LV Systolic Volume 111.99 mL    LVOT peak subhash 0.76 m/s    TDI LATERAL 0.09 m/s    TDI SEPTAL 0.05 m/s    Mr max subhash 0.05 m/s    LA volume (mod) 58.90 cm3    LV LATERAL E/E' RATIO 13.89 m/s    LV SEPTAL E/E' RATIO 25.00 m/s    FS 12 %    LA volume 68.64 cm3    LV mass 181.47 g    Left Ventricle Relative Wall Thickness 0.34 cm    AV valve area 1.58 cm2    AV Velocity Ratio 0.38     AV index (prosthetic) 0.42     E/A ratio 1.18     Mean e' 0.07 m/s    LVOT  area 3.8 cm2    LVOT stroke volume 50.76 cm3    AV peak gradient 16 mmHg    E/E' ratio 17.86 m/s    Triscuspid Valve Regurgitation Peak Gradient 32 mmHg    BSA 1.46 m2    LV Systolic Volume Index 75.7 mL/m2    LV Diastolic Volume Index 101.18 mL/m2    LA Volume Index 46.4 mL/m2    LV Mass Index 123 g/m2    LA Volume Index (Mod) 39.8 mL/m2    Right Atrial Pressure (from IVC) 15 mmHg    EF 23 %    TV resting pulmonary artery pressure 47 mmHg    Narrative    · The left ventricle is normal in size with mild eccentric hypertrophy and   severely decreased systolic function.  · The estimated ejection fraction is 23%.  · There is severe left ventricular global hypokinesis.  · Grade II left ventricular diastolic dysfunction.  · Moderate left atrial enlargement.  · Normal right ventricular size with normal right ventricular systolic   function.  · Moderate right atrial enlargement.  · There is mild aortic valve stenosis.  · Aortic valve area is 1.58 cm2; peak velocity is 2.01 m/s; mean gradient   is 8 mmHg.  · Moderate mitral regurgitation.  · Moderate to severe tricuspid regurgitation.  · Mild pulmonic regurgitation.  · Elevated central venous pressure (15 mmHg).  · The estimated PA systolic pressure is 47 mmHg.  · There is mild-moderate pulmonary hypertension.  · Moderate posterolateral pericardial effusion. Trivial under the RA          MUSA:  No results found. However, due to the size of the patient record, not all encounters were searched. Please check Results Review for a complete set of results.    ASSESSMENT/PLAN:           Pre-op Assessment    I have reviewed the Patient Summary Reports.     I have reviewed the Nursing Notes.    I have reviewed the Medications.     Review of Systems  Anesthesia Hx:  No problems with previous Anesthesia  Neg history of prior surgery. Denies Family Hx of Anesthesia complications.   Denies Personal Hx of Anesthesia complications.   Social:  Non-Smoker, No Alcohol Use     Hematology/Oncology:     Oncology Normal    -- Anemia: Hematology Comments: hgb  7.8    EENT/Dental:EENT/Dental Normal   Cardiovascular:   Exercise tolerance: good Hypertension Dysrhythmias atrial fibrillation CHF On apixaban- unknown last dose   Pulmonary:  Pulmonary Normal    Renal/:   Chronic Renal Disease, ESRD, Dialysis HD T, Th, Sa,  Last done 7/20   Hepatic/GI:  Hepatic/GI Normal    Musculoskeletal:  Musculoskeletal Normal    Neurological:  Neurology Normal    Endocrine:   Hypothyroidism    Dermatological:  Skin Normal    Psych:  Psychiatric Normal           Physical Exam  General: Well nourished    Chest/Lungs:  Clear to auscultation    Heart:  Rate: Normal  Rhythm: Regular Rhythm    Abdomen:  Normal, Soft, Nontender        Anesthesia Plan  Type of Anesthesia, risks & benefits discussed:    Anesthesia Type: Gen Natural Airway, MAC, Regional  Intra-op Monitoring Plan: Standard ASA Monitors  Post Op Pain Control Plan: multimodal analgesia and IV/PO Opioids PRN  Induction:  IV  Informed Consent: Informed consent signed with the Patient and all parties understand the risks and agree with anesthesia plan.  All questions answered.   ASA Score: 4  Day of Surgery Review of History & Physical: H&P Update referred to the surgeon/provider.    Ready For Surgery From Anesthesia Perspective.     .

## 2023-09-27 ENCOUNTER — ANESTHESIA (OUTPATIENT)
Dept: SURGERY | Facility: HOSPITAL | Age: 58
DRG: 252 | End: 2023-09-27
Payer: MEDICARE

## 2023-09-27 ENCOUNTER — HOSPITAL ENCOUNTER (INPATIENT)
Facility: HOSPITAL | Age: 58
LOS: 1 days | Discharge: HOME | DRG: 252 | End: 2023-09-29
Attending: SURGERY | Admitting: SURGERY
Payer: MEDICARE

## 2023-09-27 ENCOUNTER — DOCUMENTATION ONLY (OUTPATIENT)
Dept: ELECTROPHYSIOLOGY | Facility: CLINIC | Age: 58
End: 2023-09-27
Payer: MEDICARE

## 2023-09-27 DIAGNOSIS — T82.7XXA ARTERIOVENOUS GRAFT INFECTION: Primary | ICD-10-CM

## 2023-09-27 DIAGNOSIS — T81.30XA WOUND DEHISCENCE: ICD-10-CM

## 2023-09-27 LAB — POCT GLUCOSE: 80 MG/DL (ref 70–110)

## 2023-09-27 PROCEDURE — 35903 PR EXCISION, INFEC GRAFT, EXTREMITY: ICD-10-PCS | Mod: 78,LT,GC, | Performed by: SURGERY

## 2023-09-27 PROCEDURE — 25000003 PHARM REV CODE 250

## 2023-09-27 PROCEDURE — 35903 EXCISION GRAFT EXTREMITY: CPT | Mod: 78,LT,GC, | Performed by: SURGERY

## 2023-09-27 PROCEDURE — 36000706: Performed by: SURGERY

## 2023-09-27 PROCEDURE — 36000707: Performed by: SURGERY

## 2023-09-27 PROCEDURE — 71000033 HC RECOVERY, INTIAL HOUR: Performed by: SURGERY

## 2023-09-27 PROCEDURE — 71000015 HC POSTOP RECOV 1ST HR: Performed by: SURGERY

## 2023-09-27 PROCEDURE — 37000008 HC ANESTHESIA 1ST 15 MINUTES: Performed by: SURGERY

## 2023-09-27 PROCEDURE — D9220A PRA ANESTHESIA: Mod: ANES,,, | Performed by: ANESTHESIOLOGY

## 2023-09-27 PROCEDURE — D9220A PRA ANESTHESIA: ICD-10-PCS | Mod: ANES,,, | Performed by: ANESTHESIOLOGY

## 2023-09-27 PROCEDURE — G0378 HOSPITAL OBSERVATION PER HR: HCPCS

## 2023-09-27 PROCEDURE — 63600175 PHARM REV CODE 636 W HCPCS: Performed by: SURGERY

## 2023-09-27 PROCEDURE — 94761 N-INVAS EAR/PLS OXIMETRY MLT: CPT

## 2023-09-27 PROCEDURE — D9220A PRA ANESTHESIA: ICD-10-PCS | Mod: CRNA,,, | Performed by: NURSE ANESTHETIST, CERTIFIED REGISTERED

## 2023-09-27 PROCEDURE — D9220A PRA ANESTHESIA: Mod: CRNA,,, | Performed by: NURSE ANESTHETIST, CERTIFIED REGISTERED

## 2023-09-27 PROCEDURE — 63600175 PHARM REV CODE 636 W HCPCS: Performed by: NURSE ANESTHETIST, CERTIFIED REGISTERED

## 2023-09-27 PROCEDURE — 63600175 PHARM REV CODE 636 W HCPCS

## 2023-09-27 PROCEDURE — 25000003 PHARM REV CODE 250: Performed by: NURSE ANESTHETIST, CERTIFIED REGISTERED

## 2023-09-27 PROCEDURE — 71000016 HC POSTOP RECOV ADDL HR: Performed by: SURGERY

## 2023-09-27 PROCEDURE — 37000009 HC ANESTHESIA EA ADD 15 MINS: Performed by: SURGERY

## 2023-09-27 PROCEDURE — 27201423 OPTIME MED/SURG SUP & DEVICES STERILE SUPPLY: Performed by: SURGERY

## 2023-09-27 RX ORDER — HALOPERIDOL 5 MG/ML
0.5 INJECTION INTRAMUSCULAR EVERY 10 MIN PRN
Status: DISCONTINUED | OUTPATIENT
Start: 2023-09-27 | End: 2023-09-27 | Stop reason: HOSPADM

## 2023-09-27 RX ORDER — ACETAMINOPHEN 325 MG/1
650 TABLET ORAL EVERY 4 HOURS PRN
Status: DISCONTINUED | OUTPATIENT
Start: 2023-09-27 | End: 2023-09-29 | Stop reason: HOSPADM

## 2023-09-27 RX ORDER — MIDAZOLAM HYDROCHLORIDE 5 MG/ML
INJECTION INTRAMUSCULAR; INTRAVENOUS
Status: DISCONTINUED | OUTPATIENT
Start: 2023-09-27 | End: 2023-09-27

## 2023-09-27 RX ORDER — ONDANSETRON 2 MG/ML
INJECTION INTRAMUSCULAR; INTRAVENOUS
Status: DISCONTINUED | OUTPATIENT
Start: 2023-09-27 | End: 2023-09-27

## 2023-09-27 RX ORDER — NAPROXEN SODIUM 220 MG/1
81 TABLET, FILM COATED ORAL DAILY
Status: DISCONTINUED | OUTPATIENT
Start: 2023-09-28 | End: 2023-09-29 | Stop reason: HOSPADM

## 2023-09-27 RX ORDER — ACETAMINOPHEN 325 MG/1
650 TABLET ORAL EVERY 8 HOURS PRN
Status: DISCONTINUED | OUTPATIENT
Start: 2023-09-27 | End: 2023-09-27

## 2023-09-27 RX ORDER — ONDANSETRON 8 MG/1
8 TABLET, ORALLY DISINTEGRATING ORAL EVERY 8 HOURS PRN
Status: DISCONTINUED | OUTPATIENT
Start: 2023-09-27 | End: 2023-09-29 | Stop reason: HOSPADM

## 2023-09-27 RX ORDER — CEFAZOLIN SODIUM 1 G/3ML
INJECTION, POWDER, FOR SOLUTION INTRAMUSCULAR; INTRAVENOUS
Status: DISCONTINUED | OUTPATIENT
Start: 2023-09-27 | End: 2023-09-27

## 2023-09-27 RX ORDER — FENTANYL CITRATE 50 UG/ML
INJECTION, SOLUTION INTRAMUSCULAR; INTRAVENOUS
Status: DISCONTINUED | OUTPATIENT
Start: 2023-09-27 | End: 2023-09-27

## 2023-09-27 RX ORDER — OXYCODONE HYDROCHLORIDE 5 MG/1
5 TABLET ORAL EVERY 4 HOURS PRN
Status: DISCONTINUED | OUTPATIENT
Start: 2023-09-27 | End: 2023-09-29 | Stop reason: HOSPADM

## 2023-09-27 RX ORDER — SODIUM CHLORIDE 0.9 % (FLUSH) 0.9 %
10 SYRINGE (ML) INJECTION
Status: DISCONTINUED | OUTPATIENT
Start: 2023-09-27 | End: 2023-09-27 | Stop reason: HOSPADM

## 2023-09-27 RX ORDER — TALC
6 POWDER (GRAM) TOPICAL NIGHTLY PRN
Status: DISCONTINUED | OUTPATIENT
Start: 2023-09-27 | End: 2023-09-29 | Stop reason: HOSPADM

## 2023-09-27 RX ORDER — OXYCODONE HYDROCHLORIDE 10 MG/1
10 TABLET ORAL EVERY 4 HOURS PRN
Status: DISCONTINUED | OUTPATIENT
Start: 2023-09-27 | End: 2023-09-29 | Stop reason: HOSPADM

## 2023-09-27 RX ORDER — HYDROMORPHONE HYDROCHLORIDE 1 MG/ML
0.2 INJECTION, SOLUTION INTRAMUSCULAR; INTRAVENOUS; SUBCUTANEOUS EVERY 5 MIN PRN
Status: DISCONTINUED | OUTPATIENT
Start: 2023-09-27 | End: 2023-09-27 | Stop reason: HOSPADM

## 2023-09-27 RX ORDER — LEVOTHYROXINE SODIUM 25 UG/1
75 TABLET ORAL DAILY
Status: DISCONTINUED | OUTPATIENT
Start: 2023-09-28 | End: 2023-09-29 | Stop reason: HOSPADM

## 2023-09-27 RX ORDER — SODIUM CHLORIDE 9 MG/ML
INJECTION, SOLUTION INTRAVENOUS CONTINUOUS
Status: DISCONTINUED | OUTPATIENT
Start: 2023-09-27 | End: 2023-09-29 | Stop reason: HOSPADM

## 2023-09-27 RX ORDER — METOPROLOL SUCCINATE 25 MG/1
50 TABLET, EXTENDED RELEASE ORAL DAILY
Status: DISCONTINUED | OUTPATIENT
Start: 2023-09-28 | End: 2023-09-29 | Stop reason: HOSPADM

## 2023-09-27 RX ADMIN — SODIUM CHLORIDE: 9 INJECTION, SOLUTION INTRAVENOUS at 01:09

## 2023-09-27 RX ADMIN — ONDANSETRON 4 MG: 2 INJECTION INTRAMUSCULAR; INTRAVENOUS at 03:09

## 2023-09-27 RX ADMIN — MIDAZOLAM 2 MG: 5 INJECTION INTRAMUSCULAR; INTRAVENOUS at 01:09

## 2023-09-27 RX ADMIN — FENTANYL CITRATE 25 MCG: 50 INJECTION, SOLUTION INTRAMUSCULAR; INTRAVENOUS at 02:09

## 2023-09-27 RX ADMIN — FENTANYL CITRATE 50 MCG: 50 INJECTION, SOLUTION INTRAMUSCULAR; INTRAVENOUS at 02:09

## 2023-09-27 RX ADMIN — MEPIVACAINE HYDROCHLORIDE 25 ML: 15 INJECTION, SOLUTION EPIDURAL; INFILTRATION at 02:09

## 2023-09-27 RX ADMIN — ACETAMINOPHEN 650 MG: 325 TABLET ORAL at 09:09

## 2023-09-27 RX ADMIN — CEFAZOLIN 2 G: 330 INJECTION, POWDER, FOR SOLUTION INTRAMUSCULAR; INTRAVENOUS at 02:09

## 2023-09-27 RX ADMIN — APIXABAN 2.5 MG: 2.5 TABLET, FILM COATED ORAL at 09:09

## 2023-09-27 RX ADMIN — MIDAZOLAM 0.5 MG: 5 INJECTION INTRAMUSCULAR; INTRAVENOUS at 02:09

## 2023-09-27 RX ADMIN — FENTANYL CITRATE 50 MCG: 50 INJECTION, SOLUTION INTRAMUSCULAR; INTRAVENOUS at 01:09

## 2023-09-27 RX ADMIN — SACUBITRIL AND VALSARTAN 1 TABLET: 49; 51 TABLET, FILM COATED ORAL at 09:09

## 2023-09-27 NOTE — NURSING
Nurses Note -- 4 Eyes      9/27/2023   6:13 PM      Skin assessed during: Admit      [] No Altered Skin Integrity Present    []Prevention Measures Documented      [x] Yes- Altered Skin Integrity Present or Discovered   [x] LDA Added if Not in Epic (Describe Wound)   [x] New Altered Skin Integrity was Present on Admit and Documented in LDA   [] Wound Image Taken    Wound Care Consulted? Yes    Attending Nurse:  Uri Hernandez RN/Staff Member:   FELIPE Vail

## 2023-09-27 NOTE — BRIEF OP NOTE
[facility] - [facility] ([facility])  Brief Operative Note    SUMMARY     Surgery Date: 9/27/2023     Surgeon(s) and Role:     * FARIDEH Muñoz III, MD - Primary     * Liset Pickard MD - Resident - Assisting    Pre-op Diagnosis:  Infection of AV graft for dialysis [T82.7XXA]    Post-op Diagnosis:  Post-Op Diagnosis Codes:     * Infection of AV graft for dialysis [T82.7XXA]    PROCEDURES:    Excision, L AVG remnant  2. Wound VAC placement    Anesthesia: Regional    Operative Findings: Remnant distal 1/3 of chronically thrombosed graft removed completely.  All well incorporated.  Open wound at proximal end debrided, VAC applied     Estimated Blood Loss: 20cc           Specimens:   Specimen (24h ago, onward)      None            [id number]

## 2023-09-27 NOTE — PROGRESS NOTES
Patient prepared and ready for procedure.  Consents have been verified and signed.  Pre-op prep complete.

## 2023-09-27 NOTE — TRANSFER OF CARE
"Anesthesia Transfer of Care Note    Patient: Eva Bloom    Procedure(s) Performed: Procedure(s) (LRB):  EXCISION, AV FISTULA (Left)    Patient location: PACU    Anesthesia Type: MAC    Transport from OR: Transported from OR on room air with adequate spontaneous ventilation    Post pain: adequate analgesia    Post assessment: no apparent anesthetic complications and tolerated procedure well    Post vital signs: stable    Level of consciousness: responds to stimulation    Nausea/Vomiting: no nausea/vomiting    Complications: none    Transfer of care protocol was followed      Last vitals:   Visit Vitals  /86 (BP Location: Right arm, Patient Position: Lying)   Pulse 67   Temp 36.8 °C (98.2 °F) (Temporal)   Resp 18   Ht 5' 4" (1.626 m)   Wt 40.8 kg (90 lb)   LMP 10/24/2016   SpO2 100%   Breastfeeding No   BMI 15.45 kg/m²     "

## 2023-09-27 NOTE — PROGRESS NOTES
Spoke with Flores in pacemaker clinic.  States patient requires a magnet throughout the duration of the procedure today.

## 2023-09-27 NOTE — PROGRESS NOTES
Patient has been identified as having an implanted cardiac rhythm device (CRD); the implanted device is a [employer] Sub Q ICD.      Planned procedure: Excision of AV Fistula    No noted pacer dependency.       DEFIBRILLATORS/NON-DEPENDENT ANY LOCATION    Per protocol,a magnet should be applied directly over the implanted device during entire surgical procedure when electrocautery will be used.    If no electrocautery is planned, magnet application is not needed.    For additional questions, please contact the Arrhythmia Department at Ext [phone].

## 2023-09-27 NOTE — ANESTHESIA PROCEDURE NOTES
L supraclavicular SS and ICB    Patient location during procedure: OR    Reason for block: primary anesthetic    Diagnosis: ESRD   Start time: 9/27/2023 1:56 PM  Timeout: 9/27/2023 1:56 PM   End time: 9/27/2023 2:05 PM    Staffing  Authorizing Provider: Melchor Kee MD  Performing Provider: Chloe Au DO    Staffing  Performed by: Chloe Au DO  Authorized by: Melchor Kee MD    Preanesthetic Checklist  Completed: patient identified, IV checked, site marked, risks and benefits discussed, surgical consent, monitors and equipment checked, pre-op evaluation and timeout performed  Peripheral Block  Patient position: supine  Prep: ChloraPrep  Patient monitoring: heart rate, cardiac monitor, continuous pulse ox, continuous capnometry and frequent blood pressure checks  Block type: supraclavicular  Laterality: left  Injection technique: single shot  Needle  Needle type: Echogenic   Needle gauge: 20 G  Needle length: 2 in  Needle localization: anatomical landmarks and ultrasound guidance   -ultrasound image captured on disc.  Assessment  Injection assessment: negative aspiration, negative parasthesia and local visualized surrounding nerve  Paresthesia pain: none  Heart rate change: no  Slow fractionated injection: yes  Pain Tolerance: comfortable throughout block and no complaints  Medications:    Medications: mepivacaine (CARBOCAINE) injection 15 mg/mL (1.5%) - Perineural   25 mL - 9/27/2023 2:05:00 PM    Additional Notes  Intercostal brachial field block performed with mepivacaine 1.5% 5ml for additional coverage.    VSS.  See anesthesia record.  Patient tolerated procedure well.

## 2023-09-27 NOTE — NURSING TRANSFER
Nursing Transfer Note      9/27/2023   5:33 PM    Nurse giving handoff:Dustin    Nurse receiving handoff:FRIEDA RN     Reason patient is being transferred: recovery care complete    Transfer To: [id number]    Transfer via stretcher    Transfer with wound vac    Transported by PCT    4eyes on Skin: yes    Medicines sent: N/A    Any special needs or follow-up needed: routine    Patient belongings transferred with patient:  n/a    Chart send with patient: Yes    Notified: spouse

## 2023-09-28 LAB
ALBUMIN SERPL BCP-MCNC: 2.1 G/DL (ref 3.5–5.2)
ANION GAP SERPL CALC-SCNC: 17 MMOL/L (ref 8–16)
BASOPHILS # BLD AUTO: 0.09 K/UL (ref 0–0.2)
BASOPHILS NFR BLD: 1.6 % (ref 0–1.9)
BUN SERPL-MCNC: 38 MG/DL (ref 6–20)
CALCIUM SERPL-MCNC: 8 MG/DL (ref 8.7–10.5)
CHLORIDE SERPL-SCNC: 112 MMOL/L (ref 95–110)
CO2 SERPL-SCNC: 15 MMOL/L (ref 23–29)
CREAT SERPL-MCNC: 6 MG/DL (ref 0.5–1.4)
DIFFERENTIAL METHOD: ABNORMAL
EOSINOPHIL # BLD AUTO: 0.2 K/UL (ref 0–0.5)
EOSINOPHIL NFR BLD: 4.3 % (ref 0–8)
ERYTHROCYTE [DISTWIDTH] IN BLOOD BY AUTOMATED COUNT: 20.3 % (ref 11.5–14.5)
EST. GFR  (NO RACE VARIABLE): 7.6 ML/MIN/1.73 M^2
GLUCOSE SERPL-MCNC: 122 MG/DL (ref 70–110)
HBV CORE AB SERPL QL IA: NORMAL
HBV SURFACE AB SER-ACNC: 15.06 MIU/ML
HBV SURFACE AB SER-ACNC: REACTIVE M[IU]/ML
HBV SURFACE AG SERPL QL IA: NORMAL
HCT VFR BLD AUTO: 36.3 % (ref 37–48.5)
HGB BLD-MCNC: 11 G/DL (ref 12–16)
IMM GRANULOCYTES # BLD AUTO: 0.02 K/UL (ref 0–0.04)
IMM GRANULOCYTES NFR BLD AUTO: 0.4 % (ref 0–0.5)
LYMPHOCYTES # BLD AUTO: 1.2 K/UL (ref 1–4.8)
LYMPHOCYTES NFR BLD: 20.7 % (ref 18–48)
MAGNESIUM SERPL-MCNC: 2.1 MG/DL (ref 1.6–2.6)
MCH RBC QN AUTO: 34.4 PG (ref 27–31)
MCHC RBC AUTO-ENTMCNC: 30.3 G/DL (ref 32–36)
MCV RBC AUTO: 113 FL (ref 82–98)
MONOCYTES # BLD AUTO: 0.5 K/UL (ref 0.3–1)
MONOCYTES NFR BLD: 9 % (ref 4–15)
NEUTROPHILS # BLD AUTO: 3.6 K/UL (ref 1.8–7.7)
NEUTROPHILS NFR BLD: 64 % (ref 38–73)
NRBC BLD-RTO: 1 /100 WBC
PHOSPHATE SERPL-MCNC: 6.8 MG/DL (ref 2.7–4.5)
PHOSPHATE SERPL-MCNC: 6.9 MG/DL (ref 2.7–4.5)
PLATELET # BLD AUTO: 139 K/UL (ref 150–450)
PMV BLD AUTO: 11.5 FL (ref 9.2–12.9)
POTASSIUM SERPL-SCNC: 4.9 MMOL/L (ref 3.5–5.1)
RBC # BLD AUTO: 3.2 M/UL (ref 4–5.4)
SODIUM SERPL-SCNC: 144 MMOL/L (ref 136–145)
WBC # BLD AUTO: 5.56 K/UL (ref 3.9–12.7)

## 2023-09-28 PROCEDURE — 99214 OFFICE O/P EST MOD 30 MIN: CPT | Mod: 25,,, | Performed by: NURSE PRACTITIONER

## 2023-09-28 PROCEDURE — 36415 COLL VENOUS BLD VENIPUNCTURE: CPT

## 2023-09-28 PROCEDURE — 25000003 PHARM REV CODE 250

## 2023-09-28 PROCEDURE — 87340 HEPATITIS B SURFACE AG IA: CPT | Performed by: NURSE PRACTITIONER

## 2023-09-28 PROCEDURE — 84100 ASSAY OF PHOSPHORUS: CPT | Performed by: SURGERY

## 2023-09-28 PROCEDURE — 85025 COMPLETE CBC W/AUTO DIFF WBC: CPT

## 2023-09-28 PROCEDURE — 25000003 PHARM REV CODE 250: Performed by: NURSE PRACTITIONER

## 2023-09-28 PROCEDURE — 83735 ASSAY OF MAGNESIUM: CPT | Performed by: SURGERY

## 2023-09-28 PROCEDURE — 86706 HEP B SURFACE ANTIBODY: CPT | Mod: 91 | Performed by: NURSE PRACTITIONER

## 2023-09-28 PROCEDURE — 11000001 HC ACUTE MED/SURG PRIVATE ROOM

## 2023-09-28 PROCEDURE — 90935 PR HEMODIALYSIS, ONE EVALUATION: ICD-10-PCS | Mod: ,,, | Performed by: NURSE PRACTITIONER

## 2023-09-28 PROCEDURE — 99214 PR OFFICE/OUTPT VISIT, EST, LEVL IV, 30-39 MIN: ICD-10-PCS | Mod: 25,,, | Performed by: NURSE PRACTITIONER

## 2023-09-28 PROCEDURE — 86704 HEP B CORE ANTIBODY TOTAL: CPT | Performed by: NURSE PRACTITIONER

## 2023-09-28 PROCEDURE — 36415 COLL VENOUS BLD VENIPUNCTURE: CPT | Performed by: NURSE PRACTITIONER

## 2023-09-28 PROCEDURE — 90935 HEMODIALYSIS ONE EVALUATION: CPT | Mod: ,,, | Performed by: NURSE PRACTITIONER

## 2023-09-28 PROCEDURE — 80069 RENAL FUNCTION PANEL: CPT | Performed by: NURSE PRACTITIONER

## 2023-09-28 PROCEDURE — G0257 UNSCHED DIALYSIS ESRD PT HOS: HCPCS

## 2023-09-28 RX ORDER — SODIUM CHLORIDE 9 MG/ML
INJECTION, SOLUTION INTRAVENOUS ONCE
Status: COMPLETED | OUTPATIENT
Start: 2023-09-28 | End: 2023-09-28

## 2023-09-28 RX ADMIN — ASPIRIN 81 MG CHEWABLE TABLET 81 MG: 81 TABLET CHEWABLE at 08:09

## 2023-09-28 RX ADMIN — SODIUM CHLORIDE: 9 INJECTION, SOLUTION INTRAVENOUS at 12:09

## 2023-09-28 RX ADMIN — ACETAMINOPHEN 650 MG: 325 TABLET ORAL at 05:09

## 2023-09-28 RX ADMIN — OXYCODONE HYDROCHLORIDE 5 MG: 5 TABLET ORAL at 11:09

## 2023-09-28 RX ADMIN — LEVOTHYROXINE SODIUM 75 MCG: 25 TABLET ORAL at 08:09

## 2023-09-28 RX ADMIN — APIXABAN 2.5 MG: 2.5 TABLET, FILM COATED ORAL at 08:09

## 2023-09-28 RX ADMIN — OXYCODONE HYDROCHLORIDE 5 MG: 5 TABLET ORAL at 04:09

## 2023-09-28 RX ADMIN — SACUBITRIL AND VALSARTAN 1 TABLET: 49; 51 TABLET, FILM COATED ORAL at 08:09

## 2023-09-28 RX ADMIN — OXYCODONE HYDROCHLORIDE 5 MG: 5 TABLET ORAL at 01:09

## 2023-09-28 NOTE — OP NOTE
[facility] - [facility]  Surgery Department  Operative Note    SUMMARY     Date of Procedure: 9/27/2023     Procedure: Procedure(s) (LRB):  EXCISION, AV FISTULA (Left)     Surgeon(s) and Role:     * FARIDEH Muñoz III, MD - Primary     * Liset Pickard MD - Resident - Assisting        Pre-Operative Diagnosis: Infection of AV graft for dialysis [T82.7XXA]    Post-Operative Diagnosis: Post-Op Diagnosis Codes:     * Infection of AV graft for dialysis [T82.7XXA]    Anesthesia: Regional    Operative Findings (including complications, if any):   Remnant distal 1/3 of chronically thrombosed graft removed completely.  All well incorporated.  Open wound at proximal end debrided, VAC applied     Description of Technical Procedures:   After the patient was correctly identified and our preoperative evaluation was complete, she was taken to the operating room.  She was transferred to the operating room table where the anesthesia team performed a supraclavicular brachial plexus block.  She was then positioned in supine position on the operating room table with left arm placed on an arm board.  Monitored anesthesia care was performed and she was prepped and draped in standard sterile fashion.  A time-out was then called which confirmed patient identity, procedure, and laterality which all members of the team agreed with.    We first turned our attention to the L upper arm. A 5cm incision was made overlaying the previously ligated and thrombosed AVG. We dissected the graft free using a combination of electrocautery and Metzenbaum scissors. A clamp was placed on the more proximal side of the graft just distal to the arterial anastomosis. Curved heavy scissors were used to transect the graft and the proximal end was oversewn with 5-0 prolene to ensure hemostasis.   Next we turned our attention to the remaining thrombosed graft. Using metzenbaum scissors we dissected the remaining graft free from the surrounding tissue.   We then removed  the graft through the previous AVG ligation and revision site. We then flushed the tunnel copiously with sterile saline.    We then sharply debrided the wound overlaying the prior AVG ligation incision. After the wound base was down to healthy appearing tissue, we closed the primary incision with a running 3-0 vicryl and 4-0 monocryl in a subcuticular fashion. We then placed a wound vacuum to the wound overlaying the incision over the previous AVG ligation incision.   The primary incision was covered with sterile dressing. All counts were correct at the conclusion of the case. The patient was transported to PACU in good condition.     Estimated Blood Loss (EBL): <15cc           Implants: * No implants in log *    Specimens:   Specimen (24h ago, onward)      None                    Condition: Good    Disposition: PACU - hemodynamically stable.    Attestation: I was present and scrubbed for the entire procedure.

## 2023-09-28 NOTE — CONSULTS
[facility]  Nephrology  Consult Note    Patient Name: Eva Bloom  MRN: [id number]  Admission Date: 9/27/2023  Hospital Length of Stay: 0 days  Attending Provider: FARIDEH Muñoz III, MD   Primary Care Physician: Sowmya Mccain MD  Principal Problem:Arteriovenous graft infection    Inpatient consult to Nephrology  Consult performed by: Delicia Reese, DNP, FNP-C  Consult ordered by: Ruth Montiel MD  Reason for consult: ESRD        Subjective:     HPI: The patient is a 57 y.o. Black or  Female with multiple co morbidities including ESRD on HD who now sp L AVG excision and wound vacuum placement on 9/28/23 due to history of infected AV graft. The patient is [facility] dialysis patient of Dr. Childers at [facility]. Last HD on Tuesday via L CVC. Nephrology consulted for management of ESRD and HD treatment.        Past Medical History:   Diagnosis Date    Anemia     Anticoagulant long-term use     Atrial fibrillation     Bronchitis 03/01/2017    Cancer 2016    kidney cancer    CHF (congestive heart failure), NYHA class II, chronic, systolic     CMV (cytomegalovirus) antibody positive     Encounter for blood transfusion     ESRD (end stage renal disease)     Essential hypertension 09/23/2015    H/O herpes simplex type 2 infection     Herpes simplex type 1 antibody positive     History of kidney cancer     s/p left nephrectomy 1/2016    Hyperparathyroidism, unspecified     Hyperuricemia without signs of inflammatory arthritis and tophaceous disease     Kidney stones     LGSIL (low grade squamous intraepithelial dysplasia)     Myocardiopathy 07/21/2017    Prediabetes     Proteinuria     Renal disorder     Thyroid nodule     Urate nephropathy        Past Surgical History:   Procedure Laterality Date    BREAST CYST EXCISION      COLONOSCOPY N/A 11/12/2015    COLONOSCOPY N/A 3/12/2021    Procedure: COLONOSCOPY;  Surgeon: Brendon Lanier MD;  Location: [facility]  ENDO;  Service: Endoscopy;  Laterality: N/A;  covid test 3/9, labs prior, prep instr mailed -ml    EXCISION OF ARTERIOVENOUS FISTULA Left 9/27/2023    Procedure: EXCISION, AV FISTULA;  Surgeon: FARIDEH Muñoz III, MD;  Location: [facility] OR 2ND FLR;  Service: Vascular;  Laterality: Left;  LUE AV graft excision    INSERTION OF BIVENTRICULAR IMPLANTABLE CARDIOVERTER-DEFIBRILLATOR (ICD)  04/2021    INSERTION OF TUNNELED CENTRAL VENOUS CATHETER (CVC) WITH SUBCUTANEOUS PORT N/A 1/25/2023    Procedure: INSERTION, DUAL LUMEN CATHETER WITH PORT, WITH IMAGING GUIDANCE;  Surgeon: FARIDEH Muñoz III, MD;  Location: [facility] OR 2ND FLR;  Service: Peripheral Vascular;  Laterality: N/A;  possinble permcath placment    NEPHRECTOMY-LAPAROSCOPIC Left 01/12/2016    PERCUTANEOUS TRANSLUMINAL ANGIOPLASTY OF ARTERIOVENOUS FISTULA Left 4/19/2023    Procedure: PTA, AV FISTULA;  Surgeon: FARIDEH Muñoz III, MD;  Location: [facility] CATH LAB;  Service: Peripheral Vascular;  Laterality: Left;    PERITONEAL CATHETER INSERTION      Permacath insertion  01/12/2017    PLACEMENT OF ARTERIOVENOUS GRAFT  12/28/2022    Procedure: INSERTION, GRAFT, ARTERIOVENOUS;  Surgeon: FARIDEH Muñoz III, MD;  Location: [facility] OR 2ND FLR;  Service: Peripheral Vascular;;    REMOVAL, GRAFT, ARTERIOVENOUS, UPPER EXTREMITY Left 1/25/2023    Procedure: REMOVAL, GRAFT, ARTERIOVENOUS, UPPER EXTREMITY;  Surgeon: FARIDEH Muñoz III, MD;  Location: [facility] OR 2ND FLR;  Service: Peripheral Vascular;  Laterality: Left;    REVISION OF ARTERIOVENOUS FISTULA Left 5/1/2023    Procedure: REVISION, AV FISTULA;  Surgeon: FARIDEH Muñoz III, MD;  Location: [facility] OR 2ND FLR;  Service: Peripheral Vascular;  Laterality: Left;  LUE AVG revision    REVISION OF PROCEDURE INVOLVING ARTERIOVENOUS GRAFT Left 12/28/2022    Procedure: REVISION, PROCEDURE INVOLVING ARTERIOVENOUS GRAFT;  Surgeon: FARIDEH Muñoz III, MD;  [facility];  Service: Peripheral Vascular;  Laterality:  Left;    REVISION OF PROCEDURE INVOLVING ARTERIOVENOUS GRAFT Left 7/21/2023    Procedure: LEFT ARM ARTERIOVENOUS GRAFT EXCISION  AND LIGATION;  Surgeon: Obi Werner MD;  Location: [facility] OR;  Service: Vascular;  Laterality: Left;  Left AVG excision and revision with interposition    SALPINGOOPHORECTOMY Right 2016    KJB---DAVINCI    TONSILLECTOMY      TUBAL LIGATION         Review of patient's allergies indicates:   Allergen Reactions    Coreg [carvedilol] Other (See Comments)     Nausea/vomiting    Allopurinol      Other reaction(s): abnormal transaminases     Current Facility-Administered Medications   Medication Frequency    0.9%  NaCl infusion Continuous    0.9%  NaCl infusion Once    acetaminophen tablet 650 mg Q4H PRN    apixaban tablet 2.5 mg BID    aspirin chewable tablet 81 mg Daily    levothyroxine tablet 75 mcg Daily    melatonin tablet 6 mg Nightly PRN    metoprolol succinate (TOPROL-XL) 24 hr tablet 50 mg Daily    ondansetron disintegrating tablet 8 mg Q8H PRN    oxyCODONE immediate release tablet 5 mg Q4H PRN    oxyCODONE immediate release tablet Tab 10 mg Q4H PRN    sacubitriL-valsartan 49-51 mg per tablet 1 tablet BID     Facility-Administered Medications Ordered in Other Encounters   Medication Frequency    0.9%  NaCl infusion Continuous     Family History       Problem Relation (Age of Onset)    Cataracts Maternal Aunt    Diabetes Father, Maternal Grandfather    Heart disease Sister    Hypertension Mother    Kidney disease Father    No Known Problems Sister, Sister, Brother, Brother, Maternal Uncle, Paternal Aunt, Paternal Uncle, Maternal Grandmother, Paternal Grandmother, Paternal Grandfather, Son, Son, Other          Tobacco Use    Smoking status: Never     Passive exposure: Never    Smokeless tobacco: Never   Substance and Sexual Activity    Alcohol use: No    Drug use: Yes     Types: Hydrocodone    Sexual activity: Not Currently     Review of Systems    Constitutional:  Negative for activity change, appetite change, fatigue and fever.   HENT:  Negative for congestion and hearing loss.    Eyes:  Negative for visual disturbance.   Respiratory:  Negative for cough, chest tightness and shortness of breath.    Cardiovascular:  Negative for chest pain, palpitations and leg swelling.   Gastrointestinal:  Negative for abdominal distention, abdominal pain, blood in stool, constipation, diarrhea, nausea and vomiting.   Genitourinary:  Positive for decreased urine volume. Negative for dysuria and urgency.   Musculoskeletal:  Negative for gait problem.   Skin:  Positive for wound.   Allergic/Immunologic: Negative.    Neurological:  Negative for dizziness, weakness and headaches.   Psychiatric/Behavioral:  Negative for agitation, behavioral problems, confusion and decreased concentration.      Objective:     Vital Signs (Most Recent):  Temp: 97.3 °F (36.3 °C) (09/28/23 0728)  Pulse: 65 (09/28/23 0728)  Resp: 16 (09/28/23 0728)  BP: 109/74 (09/28/23 0728)  SpO2: (!) 90 % (09/28/23 0728) Vital Signs (24h Range):  Temp:  [97 °F (36.1 °C)-98.2 °F (36.8 °C)] 97.3 °F (36.3 °C)  Pulse:  [56-76] 65  Resp:  [12-20] 16  SpO2:  [90 %-100 %] 90 %  BP: (103-133)/(61-97) 109/74     Weight: 40.8 kg (90 lb) (09/27/23 1228)  Body mass index is 15.45 kg/m².  Body surface area is 1.36 meters squared.    I/O last 3 completed shifts:  In: 610 [P.O.:460; IV Piggyback:150]  Out: 0      Physical Exam  Vitals and nursing note reviewed.   Constitutional:       Appearance: Normal appearance.   HENT:      Head: Normocephalic.      Nose: Nose normal.      Mouth/Throat:      Mouth: Mucous membranes are dry.   Eyes:      General: No scleral icterus.  Cardiovascular:      Rate and Rhythm: Normal rate.   Pulmonary:      Effort: Pulmonary effort is normal.   Abdominal:      Palpations: Abdomen is soft.   Musculoskeletal:         General: Normal range of motion.      Cervical back: Normal range of motion.  "  Skin:     General: Skin is warm and dry.      Capillary Refill: Capillary refill takes less than 2 seconds.      Comments: EMELIA sharma surgical dressing  Wound vac placed    Neurological:      Mental Status: She is alert and oriented to person, place, and time.   Psychiatric:         Mood and Affect: Mood normal.         Behavior: Behavior normal.          Significant Labs:  CBC:   Recent Labs   Lab 09/28/23  0601   WBC 5.56   RBC 3.20*   HGB 11.0*   HCT 36.3*   *   *   MCH 34.4*   MCHC 30.3*     CMP: No results for input(s): "GLU", "CALCIUM", "ALBUMIN", "PROT", "NA", "K", "CO2", "CL", "BUN", "CREATININE", "ALKPHOS", "ALT", "AST", "BILITOT" in the last 168 hours.  All labs within the past 24 hours have been reviewed.    Assessment/Plan:     Renal/  ESRD (end stage renal disease) on dialysis  57 y.o. Black or  Female ESRD-HD T-T-S  presents to ED on 9/27/2023 with diagnosis of: Infection of AV graft for dialysis [T82.7XXA];Wound dehiscence [T81.30XA] sp AVG excision with Vascular this admission.   Nephrology consulted for inpatient ESRD-HD management    Outpatient HD Information:  -Dialysis modality: Hemodialysis  -Outpatient HD unit: [facility]  -Nephrologist: Alvarado BRAGA  -HD TX days: Tuesday/Thursday/Saturday, duration of treatment: 3 hrs  -Last HD TX prior to hospital admission: 09/26/23  -Dialysis access: dialysis catheter   -Residual urine: Minium   -EDW: 40.4 kg     Assessment:   - Will provide dialysis today (09/28/2023) with UF - 1-2L as BP tolerates for metabolic clearance and volume management   - Labs reviewed and dialysate to be adjusted to current labs.   - Will obtain OP dialysis records if here longer than 1-2 days   - Continue to monitor intake and output  - Please avoid gadolinium, fleets, phos-based laxatives, NSAIDs  - Dialysis thrice weekly unless more urgent indications arise. Will evaluate RRT requirements Daily.    Anemia of ESRD   Recent Labs   Lab 09/28/23  0601 "   WBC 5.56   HGB 11.0*   HCT 36.3*   *     Lab Results   Component Value Date    FESATURATED 42 07/19/2023    FERRITIN 948 (H) 07/19/2023       - Goal in ESRD is Hgb of 10-11. At goal.   - EPO can be administered and dosed per his OP unit upon discharge.    Mineral Bone Disease in ESRD   Lab Results   Component Value Date    PTH >2,500.0 (H) 02/17/2023    CALCIUM 7.8 (L) 09/13/2023    ALBUMIN 2.2 (L) 09/13/2023    PHOS 6.8 (H) 09/28/2023       - F/U PO4, Mg, Calcium. And albumin levels daily.   - Renal diet with protein intake goal 1.5 g/kg/d with 1 L fluid restriction   - Novasource with meals  - Restart home phos binder. Phos 6.8.      ID  * Arteriovenous graft infection  - sp AVG excision with wound vacuum placement on 9/28        Thank you for your consult. I will follow-up with patient. Please contact us if you have any additional questions.    Delicia Reese, MANUEL, FNP-C  Nephrology  [facility]

## 2023-09-28 NOTE — SUBJECTIVE & OBJECTIVE
"  Medications:  Continuous Infusions:   sodium chloride 0.9%       Scheduled Meds:   apixaban  2.5 mg Oral BID    aspirin  81 mg Oral Daily    levothyroxine  75 mcg Oral Daily    metoprolol succinate  50 mg Oral Daily    sacubitriL-valsartan  1 tablet Oral BID     PRN Meds:acetaminophen, melatonin, ondansetron, oxyCODONE, oxyCODONE     Objective:     Vital Signs (Most Recent):  Temp: 97.6 °F (36.4 °C) (09/28/23 0423)  Pulse: 63 (09/28/23 0423)  Resp: 20 (09/28/23 0456)  BP: 109/72 (09/28/23 0423)  SpO2: (!) 94 % (09/28/23 0423) Vital Signs (24h Range):  Temp:  [97 °F (36.1 °C)-98.2 °F (36.8 °C)] 97.6 °F (36.4 °C)  Pulse:  [56-76] 63  Resp:  [12-20] 20  SpO2:  [92 %-100 %] 94 %  BP: (103-133)/(61-97) 109/72          Physical Exam  Constitutional:       Appearance: Normal appearance.   HENT:      Head: Normocephalic.      Nose: Nose normal.      Mouth/Throat:      Mouth: Mucous membranes are dry.   Eyes:      Pupils: Pupils are equal, round, and reactive to light.   Cardiovascular:      Rate and Rhythm: Normal rate.   Pulmonary:      Effort: Pulmonary effort is normal.   Abdominal:      Palpations: Abdomen is soft.   Musculoskeletal:         General: Normal range of motion.      Cervical back: Normal range of motion.   Skin:     General: Skin is warm and dry.      Capillary Refill: Capillary refill takes less than 2 seconds.      Comments: EMELIA sharma surgical dressing  Wound vac placed    Neurological:      Mental Status: She is alert and oriented to person, place, and time.   Psychiatric:         Mood and Affect: Mood normal.         Behavior: Behavior normal.          Significant Labs:  CBC: No results for input(s): "WBC", "RBC", "HGB", "HCT", "PLT", "MCV", "MCH", "MCHC" in the last 48 hours.  CMP: No results for input(s): "GLU", "CALCIUM", "ALBUMIN", "PROT", "NA", "K", "CO2", "CL", "BUN", "CREATININE", "ALKPHOS", "ALT", "AST", "BILITOT" in the last 48 hours.    Significant Diagnostics:  I have reviewed all pertinent " imaging results/findings within the past 24 hours.

## 2023-09-28 NOTE — ANESTHESIA POSTPROCEDURE EVALUATION
Anesthesia Post Evaluation    Patient: Eva Bloom    Procedure(s) Performed: Procedure(s) (LRB):  EXCISION, AV FISTULA (Left)    Final Anesthesia Type: MAC      Patient location during evaluation: PACU  Patient participation: Yes- Able to Participate  Level of consciousness: awake  Post-procedure vital signs: reviewed and stable  Pain management: adequate  Airway patency: patent    PONV status at discharge: No PONV  Anesthetic complications: no      Cardiovascular status: hemodynamically stable  Respiratory status: unassisted and spontaneous ventilation  Hydration status: euvolemic  Follow-up not needed.          Vitals Value Taken Time   /74 09/28/23 0728   Temp 36.3 °C (97.3 °F) 09/28/23 0728   Pulse 65 09/28/23 0728   Resp 16 09/28/23 0728   SpO2 90 % 09/28/23 0728         Event Time   Out of Recovery 15:45:00         Pain/Mimi Score: Pain Rating Prior to Med Admin: 5 (9/28/2023  4:56 AM)  Pain Rating Post Med Admin: 0 (9/27/2023 10:50 PM)  Mimi Score: 9 (9/27/2023  3:45 PM)

## 2023-09-28 NOTE — SUBJECTIVE & OBJECTIVE
Past Medical History:   Diagnosis Date    Anemia     Anticoagulant long-term use     Atrial fibrillation     Bronchitis 03/01/2017    Cancer 2016    kidney cancer    CHF (congestive heart failure), NYHA class II, chronic, systolic     CMV (cytomegalovirus) antibody positive     Encounter for blood transfusion     ESRD (end stage renal disease)     Essential hypertension 09/23/2015    H/O herpes simplex type 2 infection     Herpes simplex type 1 antibody positive     History of kidney cancer     s/p left nephrectomy 1/2016    Hyperparathyroidism, unspecified     Hyperuricemia without signs of inflammatory arthritis and tophaceous disease     Kidney stones     LGSIL (low grade squamous intraepithelial dysplasia)     Myocardiopathy 07/21/2017    Prediabetes     Proteinuria     Renal disorder     Thyroid nodule     Urate nephropathy        Past Surgical History:   Procedure Laterality Date    BREAST CYST EXCISION      COLONOSCOPY N/A 11/12/2015    COLONOSCOPY N/A 3/12/2021    Procedure: COLONOSCOPY;  Surgeon: Brendon Lanier MD;  Location: [facility];  Service: Endoscopy;  Laterality: N/A;  covid test 3/9, labs prior, prep instr mailed -ml    EXCISION OF ARTERIOVENOUS FISTULA Left 9/27/2023    Procedure: EXCISION, AV FISTULA;  Surgeon: FARIDEH Muñoz III, MD;  Location: [facility] OR [address] [place] [address];  Service: Vascular;  Laterality: Left;  LUE AV graft excision    INSERTION OF BIVENTRICULAR IMPLANTABLE CARDIOVERTER-DEFIBRILLATOR (ICD)  04/2021    INSERTION OF TUNNELED CENTRAL VENOUS CATHETER (CVC) WITH SUBCUTANEOUS PORT N/A 1/25/2023    Procedure: INSERTION, DUAL LUMEN CATHETER WITH PORT, WITH IMAGING GUIDANCE;  Surgeon: FARIDEH Muñoz III, MD;  [facility];  Service: Peripheral Vascular;  Laterality: N/A;  possinble permcath placment    NEPHRECTOMY-LAPAROSCOPIC Left 01/12/2016    PERCUTANEOUS TRANSLUMINAL ANGIOPLASTY OF ARTERIOVENOUS FISTULA Left 4/19/2023    Procedure: PTA, AV FISTULA;  Surgeon: FARIDEH Muñoz  III, MD;  Location: [facility] CATH LAB;  Service: Peripheral Vascular;  Laterality: Left;    PERITONEAL CATHETER INSERTION      Permacath insertion  01/12/2017    PLACEMENT OF ARTERIOVENOUS GRAFT  12/28/2022    Procedure: INSERTION, GRAFT, ARTERIOVENOUS;  Surgeon: FARIDEH Muñoz III, MD;  [facility];  Service: Peripheral Vascular;;    REMOVAL, GRAFT, ARTERIOVENOUS, UPPER EXTREMITY Left 1/25/2023    Procedure: REMOVAL, GRAFT, ARTERIOVENOUS, UPPER EXTREMITY;  Surgeon: FARIDEH Muñoz III, MD;  [facility];  Service: Peripheral Vascular;  Laterality: Left;    REVISION OF ARTERIOVENOUS FISTULA Left 5/1/2023    Procedure: REVISION, AV FISTULA;  Surgeon: FARIDEH uMñoz III, MD;  [facility];  Service: Peripheral Vascular;  Laterality: Left;  LUE AVG revision    REVISION OF PROCEDURE INVOLVING ARTERIOVENOUS GRAFT Left 12/28/2022    Procedure: REVISION, PROCEDURE INVOLVING ARTERIOVENOUS GRAFT;  Surgeon: FARIDEH Muñoz III, MD;  [facility];  Service: Peripheral Vascular;  Laterality: Left;    REVISION OF PROCEDURE INVOLVING ARTERIOVENOUS GRAFT Left 7/21/2023    Procedure: LEFT ARM ARTERIOVENOUS GRAFT EXCISION  AND LIGATION;  Surgeon: Obi Werner MD;  Location: [facility];  Service: Vascular;  Laterality: Left;  Left AVG excision and revision with interposition    SALPINGOOPHORECTOMY Right 2016    KJB---DAVINCI    TONSILLECTOMY      TUBAL LIGATION         Review of patient's allergies indicates:   Allergen Reactions    Coreg [carvedilol] Other (See Comments)     Nausea/vomiting    Allopurinol      Other reaction(s): abnormal transaminases     Current Facility-Administered Medications   Medication Frequency    0.9%  NaCl infusion Continuous    0.9%  NaCl infusion Once    acetaminophen tablet 650 mg Q4H PRN    apixaban tablet 2.5 mg BID    aspirin chewable tablet 81 mg Daily    levothyroxine tablet 75 mcg Daily    melatonin tablet 6 mg Nightly PRN    metoprolol  succinate (TOPROL-XL) 24 hr tablet 50 mg Daily    ondansetron disintegrating tablet 8 mg Q8H PRN    oxyCODONE immediate release tablet 5 mg Q4H PRN    oxyCODONE immediate release tablet Tab 10 mg Q4H PRN    sacubitriL-valsartan 49-51 mg per tablet 1 tablet BID     Facility-Administered Medications Ordered in Other Encounters   Medication Frequency    0.9%  NaCl infusion Continuous     Family History       Problem Relation (Age of Onset)    Cataracts Maternal Aunt    Diabetes Father, Maternal Grandfather    Heart disease Sister    Hypertension Mother    Kidney disease Father    No Known Problems Sister, Sister, Brother, Brother, Maternal Uncle, Paternal Aunt, Paternal Uncle, Maternal Grandmother, Paternal Grandmother, Paternal Grandfather, Son, Son, Other          Tobacco Use    Smoking status: Never     Passive exposure: Never    Smokeless tobacco: Never   Substance and Sexual Activity    Alcohol use: No    Drug use: Yes     Types: Hydrocodone    Sexual activity: Not Currently     Review of Systems   Constitutional:  Negative for activity change, appetite change, fatigue and fever.   HENT:  Negative for congestion and hearing loss.    Eyes:  Negative for visual disturbance.   Respiratory:  Negative for cough, chest tightness and shortness of breath.    Cardiovascular:  Negative for chest pain, palpitations and leg swelling.   Gastrointestinal:  Negative for abdominal distention, abdominal pain, blood in stool, constipation, diarrhea, nausea and vomiting.   Genitourinary:  Positive for decreased urine volume. Negative for dysuria and urgency.   Musculoskeletal:  Negative for gait problem.   Skin:  Positive for wound.   Allergic/Immunologic: Negative.    Neurological:  Negative for dizziness, weakness and headaches.   Psychiatric/Behavioral:  Negative for agitation, behavioral problems, confusion and decreased concentration.      Objective:     Vital Signs (Most Recent):  Temp: 97.3 °F (36.3 °C) (09/28/23  "0728)  Pulse: 65 (09/28/23 0728)  Resp: 16 (09/28/23 0728)  BP: 109/74 (09/28/23 0728)  SpO2: (!) 90 % (09/28/23 0728) Vital Signs (24h Range):  Temp:  [97 °F (36.1 °C)-98.2 °F (36.8 °C)] 97.3 °F (36.3 °C)  Pulse:  [56-76] 65  Resp:  [12-20] 16  SpO2:  [90 %-100 %] 90 %  BP: (103-133)/(61-97) 109/74     Weight: 40.8 kg (90 lb) (09/27/23 1228)  Body mass index is 15.45 kg/m².  Body surface area is 1.36 meters squared.    I/O last 3 completed shifts:  In: 610 [P.O.:460; IV Piggyback:150]  Out: 0      Physical Exam  Vitals and nursing note reviewed.   Constitutional:       Appearance: Normal appearance.   HENT:      Head: Normocephalic.      Nose: Nose normal.      Mouth/Throat:      Mouth: Mucous membranes are dry.   Eyes:      General: No scleral icterus.  Cardiovascular:      Rate and Rhythm: Normal rate.   Pulmonary:      Effort: Pulmonary effort is normal.   Abdominal:      Palpations: Abdomen is soft.   Musculoskeletal:         General: Normal range of motion.      Cervical back: Normal range of motion.   Skin:     General: Skin is warm and dry.      Capillary Refill: Capillary refill takes less than 2 seconds.      Comments: EMELIA sharma surgical dressing  Wound vac placed    Neurological:      Mental Status: She is alert and oriented to person, place, and time.   Psychiatric:         Mood and Affect: Mood normal.         Behavior: Behavior normal.          Significant Labs:  CBC:   Recent Labs   Lab 09/28/23  0601   WBC 5.56   RBC 3.20*   HGB 11.0*   HCT 36.3*   *   *   MCH 34.4*   MCHC 30.3*     CMP: No results for input(s): "GLU", "CALCIUM", "ALBUMIN", "PROT", "NA", "K", "CO2", "CL", "BUN", "CREATININE", "ALKPHOS", "ALT", "AST", "BILITOT" in the last 168 hours.  All labs within the past 24 hours have been reviewed.  "

## 2023-09-28 NOTE — PLAN OF CARE
Team reports patient medically stable for d/c today pending delivery of a home wound vac. Form delivered per team and e-mailed to [facility] rep [place] at this time.      Jeanne LOW  Case Management  [facility]  [phone]

## 2023-09-28 NOTE — PLAN OF CARE
[facility] - [facility]  Discharge Assessment    Primary Care Provider: Sowmya Mccain MD     Discharge Assessment (most recent)       BRIEF DISCHARGE ASSESSMENT - 09/28/23 0930          Discharge Planning    Assessment Type Discharge Planning Brief Assessment     Resource/Environmental Concerns none     Support Systems Spouse/significant other     Equipment Currently Used at Home walker, rolling;wheelchair     Current Living Arrangements home     Patient/Family Anticipates Transition to home with family     Patient/Family Anticipated Services at Transition home health care     DME Needed Upon Discharge  negative pressure wound therapy device     Discharge Plan A Home with family;Home Health        Physical Activity    On average, how many days per week do you engage in moderate to strenuous exercise (like a brisk walk)? 0 days     On average, how many minutes do you engage in exercise at this level? 0 min        Financial Resource Strain    How hard is it for you to pay for the very basics like food, housing, medical care, and heating? Not hard at all        Housing Stability    In the last 12 months, was there a time when you were not able to pay the mortgage or rent on time? No     In the last 12 months, how many places have you lived? 1     In the last 12 months, was there a time when you did not have a steady place to sleep or slept in a shelter (including now)? No        Transportation Needs    In the past 12 months, has lack of transportation kept you from medical appointments or from getting medications? No     In the past 12 months, has lack of transportation kept you from meetings, work, or from getting things needed for daily living? No        Food Insecurity    Within the past 12 months, you worried that your food would run out before you got the money to buy more. Never true     Within the past 12 months, the food you bought just didn't last and you didn't have money to get more. Never true        Stress     Do you feel stress - tense, restless, nervous, or anxious, or unable to sleep at night because your mind is troubled all the time - these days? Not at all        Social Connections    In a typical week, how many times do you talk on the phone with family, friends, or neighbors? More than three times a week     How often do you get together with friends or relatives? More than three times a week     How often do you attend Rastafari or Adventism services? More than 4 times per year     Do you belong to any clubs or organizations such as Rastafari groups, unions, fraternal or athletic groups, or school groups? No     How often do you attend meetings of the clubs or organizations you belong to? Never     Are you , , , , never , or living with a partner?         Alcohol Use    Q1: How often do you have a drink containing alcohol? Never     Q2: How many drinks containing alcohol do you have on a typical day when you are drinking? Patient does not drink     Q3: How often do you have six or more drinks on one occasion? Never                   Spoke with Patient at bedside to complete d/c planning assessment. Patient lives with her spouse in a single story home with no steps to enter. Independent with ADL's using walker and wheelchair as needed. Verified PCP, Pharmacy and health insurance. [facility] wound vac ordered and expecting bedside delivery today after lunch. [facility] arranged for post-acute wound vac changes. Patient has dialysis at [place] [address]. Treatments are T-TH-S at 5AM and transportation is provided by patient's spouse. Patient to d/c home today if wound vac delivered. Will continue to follow.      Jeanne Barney RNCM  Case Management  [facility]  [phone]

## 2023-09-28 NOTE — PLAN OF CARE
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  That is usually related to cold sores which you have a history of  So the ulcer you had could have been an isolated event and something else  Other possibilities are that you auto inoculated yourself from a cold sore to the genital area or that your partner has herpes simplex 1 also  Please let me know if you have a recurrence  I have reviewed the chart of Eva Bloom and collaborated with FARIDEH Muñoz III, MD in the care of the patient who is hospitalized for the following:    Active Hospital Problems    Diagnosis    *Arteriovenous graft infection    Body mass index (BMI) less than 19    Hypothyroidism    PAF (paroxysmal atrial fibrillation)    Anemia in ESRD (end-stage renal disease)    Chronic combined systolic and diastolic congestive heart failure    ESRD (end stage renal disease) on dialysis     - Tuesday, Thursday, and Saturday  - [facility]  - Dr. Hart, nephrology      Essential hypertension          I have reviewed the Eva Velezte Wolf with the multidisciplinary team during discharge huddle.       Princess Singleton PA-C  [facility]

## 2023-09-28 NOTE — PROGRESS NOTES
[facility] H[facility] NOTE     Patient currently on hemodialysis for removal of uremic toxins .     Patient seen and evaluated on hemodialysis, tolerating treatment, see HD flowsheet for vitals and assessments.      No chest pain, shortness of breath, cramping, nausea or vomiting.     Target UF: 0.5-1 L as tolerated. Hypotensive episodes noted during treatment. UFR adjusted.   See full consult note for further recommendations.     DIANA Reese DNP, APRN, FNP-C  Department of Nephrology  [facility]  Pager: [phone]

## 2023-09-28 NOTE — NURSING
Dialysis tx started to the left chest perm cath per orders placed by MANUEL Reese:    Order Questions    Question Answer   Antibiotics on HD? No   Duration of Treatment 3 hours   Dialyzer F160   Dialysate Temperature (C) 36.5    mL/min    mL/min   K+ Potassium per Protocol   Ca++ Calcium per Protocol   Na+ Sodium per Protocol   Bicarb Bicarbonate per Protocol   Access Other (please specify)   Fluid Removal (L) 2L   Fluid Removal Instructions maintain SBP > 90 mmHG   Dialysate Bath Solution Protocol (DO NOT MODIFY ANSWER) [email or web address] Dialysate Bath Solution Algorithm (formatted with date).pdf

## 2023-09-28 NOTE — PROGRESS NOTES
[facility]  Vascular Surgery  Progress Note    Patient Name: Eva Bloom  MRN: [id number]  Admission Date: 9/27/2023  Primary Care Provider: Sowmya Mccain MD    Subjective:     Interval History: NAEON, patient feeling well.  WV in place, patient will be ok to DC when home wound vac arrives.    Post-Op Info:  Procedure(s) (LRB):  EXCISION, AV FISTULA (Left)   1 Day Post-Op       Medications:  Continuous Infusions:   sodium chloride 0.9%       Scheduled Meds:   apixaban  2.5 mg Oral BID    aspirin  81 mg Oral Daily    levothyroxine  75 mcg Oral Daily    metoprolol succinate  50 mg Oral Daily    sacubitriL-valsartan  1 tablet Oral BID     PRN Meds:acetaminophen, melatonin, ondansetron, oxyCODONE, oxyCODONE     Objective:     Vital Signs (Most Recent):  Temp: 97.6 °F (36.4 °C) (09/28/23 0423)  Pulse: 63 (09/28/23 0423)  Resp: 20 (09/28/23 0456)  BP: 109/72 (09/28/23 0423)  SpO2: (!) 94 % (09/28/23 0423) Vital Signs (24h Range):  Temp:  [97 °F (36.1 °C)-98.2 °F (36.8 °C)] 97.6 °F (36.4 °C)  Pulse:  [56-76] 63  Resp:  [12-20] 20  SpO2:  [92 %-100 %] 94 %  BP: (103-133)/(61-97) 109/72          Physical Exam  Constitutional:       Appearance: Normal appearance.   HENT:      Head: Normocephalic.      Nose: Nose normal.      Mouth/Throat:      Mouth: Mucous membranes are dry.   Eyes:      Pupils: Pupils are equal, round, and reactive to light.   Cardiovascular:      Rate and Rhythm: Normal rate.   Pulmonary:      Effort: Pulmonary effort is normal.   Abdominal:      Palpations: Abdomen is soft.   Musculoskeletal:         General: Normal range of motion.      Cervical back: Normal range of motion.   Skin:     General: Skin is warm and dry.      Capillary Refill: Capillary refill takes less than 2 seconds.      Comments: EMELIA w surgical dressing  Wound vac placed    Neurological:      Mental Status: She is alert and oriented to person, place, and time.   Psychiatric:         Mood and Affect: Mood normal.     "     Behavior: Behavior normal.          Significant Labs:  CBC: No results for input(s): "WBC", "RBC", "HGB", "HCT", "PLT", "MCV", "MCH", "MCHC" in the last 48 hours.  CMP: No results for input(s): "GLU", "CALCIUM", "ALBUMIN", "PROT", "NA", "K", "CO2", "CL", "BUN", "CREATININE", "ALKPHOS", "ALT", "AST", "BILITOT" in the last 48 hours.    Significant Diagnostics:  I have reviewed all pertinent imaging results/findings within the past 24 hours.    Assessment/Plan:     Arteriovenous graft infection  Ms. Bloom is a 57-year old female with history of infected AV graft who is now s/p L AVG excision and wound vac placement on 9/28.  -OK for diet  -OK to ambulate  -Maintain wound vac, 125mmHg continuous suction  -Nephrology consulted for assistance w wv  -Patient will need WV at home    -Dispo: Pending WV delivery        Violette Robledo NP  Vascular Surgery  [facility] - Surgery  "

## 2023-09-28 NOTE — PLAN OF CARE
On call CM notified by Nelsy with  that a new script is needed for delivery of the wound vac for patient discharge.  CM sent message to Dr. Muñoz and Violette Robledo NP that this needs completion for the delivery of wound vac.    5:17 No available attending or NP to sign new [facility] wound vac form.  Form placed in chart and CM communicated with residents on service for completion in am.

## 2023-09-28 NOTE — PLAN OF CARE
[facility] - Surgery      HOME HEALTH ORDERS  FACE TO FACE ENCOUNTER    Patient Name: Eva Bloom  YOB: 1965    PCP: Sowmya Mccain MD   PCP Address: [address] / [place] [address]  PCP Phone Number: [phone]  PCP Fax: [phone]    Encounter Date: 9/22/23    Admit to Home Health    Diagnoses:  Active Hospital Problems    Diagnosis  POA    Arteriovenous graft infection [T82.7XXA]  Yes     Chronic      Resolved Hospital Problems   No resolved problems to display.       Follow Up Appointments:  Future Appointments   Date Time Provider Department Center   10/2/2023  7:30 AM Basim Farooq DPM [facility] POD [place]   10/27/2023  8:30 AM FARIDEH Muñoz III, MD [facility] VASCSUR [facility]   10/27/2023 11:30 AM Malik Burgos MD [facility] HNSO [facility]   11/6/2023 10:00 AM [facility] BCC CT1 [facility] CT OREN [place]   11/8/2023  8:00 [facility] LAB HO [place]   11/8/2023  9:00 AM Ben Rivera MD [facility] HEMONC3 [place]       Allergies:  Review of patient's allergies indicates:   Allergen Reactions    Coreg [carvedilol] Other (See Comments)     Nausea/vomiting    Allopurinol      Other reaction(s): abnormal transaminases       Medications: Review discharge medications with patient and family and provide education.    Current Facility-Administered Medications   Medication Dose Route Frequency Provider Last Rate Last Admin    0.9%  NaCl infusion   Intravenous Continuous Justyn Qiu MD        acetaminophen tablet 650 mg  650 mg Oral Q4H PRN Liset Pickard MD   650 mg at 09/27/23 2150    apixaban tablet 2.5 mg  2.5 mg Oral BID Liset Pickard MD   2.5 mg at 09/27/23 2150    aspirin chewable tablet 81 mg  81 mg Oral Daily Liset Pickard MD        levothyroxine tablet 75 mcg  75 mcg Oral Daily Liset Pickard MD        melatonin tablet 6 mg  6 mg Oral Nightly PRN Liset Pickard MD        metoprolol succinate (TOPROL-XL) 24 hr tablet 50 mg  50 mg Oral Daily Liset Pickard MD         ondansetron disintegrating tablet 8 mg  8 mg Oral Q8H PRN Liset Pickard MD        oxyCODONE immediate release tablet 5 mg  5 mg Oral Q4H PRN Liset Pickard MD   5 mg at 09/28/23 0456    oxyCODONE immediate release tablet Tab 10 mg  10 mg Oral Q4H PRN Liset Pickard MD        sacubitriL-valsartan 49-51 mg per tablet 1 tablet  1 tablet Oral BID Liset Pickard MD   1 tablet at 09/27/23 2150     Facility-Administered Medications Ordered in Other Encounters   Medication Dose Route Frequency Provider Last Rate Last Admin    0.9%  NaCl infusion   Intravenous Continuous Soni Bhatti MD   New Bag at 07/21/23 1116     Current Discharge Medication List        CONTINUE these medications which have NOT CHANGED    Details   aspirin 81 MG Chew Take 1 tablet (81 mg total) by mouth once daily.  Qty: 30 tablet, Refills: 11      cabozantinib (CABOMETYX) 60 mg Tab TAKE ONE TABLET BY MOUTH ONCE DAILY AT THE SAME TIME ON AN EMPTY STOMACH AT LEAST 1 HOUR BEFORE OR 2 HOURS AFTER EATING. AVOID GRAPEFRUIT PRODUCTS  Qty: 30 tablet, Refills: 4    Associated Diagnoses: Metastatic renal cell carcinoma, unspecified laterality      cloNIDine 0.3 mg/24 hr td ptwk (CATAPRES) 0.3 mg/24 hr Place 1 patch onto the skin every 7 days.  Qty: 4 patch, Refills: 11    Comments: .  Associated Diagnoses: Essential hypertension      ENTRESTO 49-51 mg per tablet TAKE 1 TABLET BY MOUTH TWICE A DAY  Qty: 180 tablet, Refills: 3    Associated Diagnoses: Cardiomyopathy, nonischemic; Combined systolic and diastolic cardiac dysfunction      epoetin david-epbx (RETACRIT INJ) Epoetin david - epbx (Retacrit)      levothyroxine (SYNTHROID) 75 MCG tablet Take 1 tablet (75 mcg total) by mouth once daily.  Qty: 30 tablet, Refills: 11    Associated Diagnoses: Hypothyroidism, unspecified type      metoprolol succinate (TOPROL-XL) 100 MG 24 hr tablet Take 1/2 tablet (50mg) once daily  Qty: 90 tablet, Refills: 3    Comments: .  Associated Diagnoses: Hypertensive CKD, ESRD on  dialysis      mv,Ca,min-folic acid-vit K1 (ONE-A-DAY WOMEN'S 50 PLUS) 400-20 mcg Tab Take 1 tablet by mouth once daily.      acetaminophen (TYLENOL) 500 MG tablet Take 500 mg by mouth every 6 (six) hours as needed for Pain.      apixaban (ELIQUIS) 2.5 mg Tab Take 1 tablet (2.5 mg total) by mouth 2 (two) times daily.  Qty: 60 tablet, Refills: 11    Associated Diagnoses: PAF (paroxysmal atrial fibrillation)      cyclobenzaprine (FLEXERIL) 5 MG tablet TAKE 1 TABLET BY MOUTH DAILY AS NEEDED FOR MUSCLE SPASMS.  Qty: 30 tablet, Refills: 1    Associated Diagnoses: Metastatic renal cell carcinoma, unspecified laterality; Hypothyroidism, unspecified type; ESRD (end stage renal disease) on dialysis; Metastatic renal cell carcinoma to intra-abdominal site; Metastatic renal cell carcinoma, left; Anemia due to end stage renal disease; Anemia, unspecified type; S/p nephrectomy      fluticasone propionate (FLONASE) 50 mcg/actuation nasal spray 1 spray (50 mcg total) by Each Nostril route 2 (two) times daily as needed (ear fullness).  Qty: 16 g, Refills: 2      hydrALAZINE (APRESOLINE) 100 MG tablet Take 1 tablet (100 mg total) by mouth every 12 (twelve) hours.  Qty: 180 tablet, Refills: 2      HYDROcodone-acetaminophen (NORCO) 5-325 mg per tablet Take 1 tablet by mouth every 6 (six) hours as needed for Pain.  Qty: 10 tablet, Refills: 0    Comments: Quantity prescribed more than 7 day supply? No      lanthanum (FOSRENOL) 1000 MG chewable tablet Take 1 tablet by mouth.      lidocaine-prilocaine (EMLA) cream APPLY ATLEAST 30 MINUTES BEFORE TREATMENT 3 TIMES A WEEK  Qty: 30 g, Refills: 11    Associated Diagnoses: Hypertensive CKD, ESRD on dialysis      naloxone (NARCAN) 1 mg/mL injection 2 mg (1 mg per nostril) by Nasal route as needed for opioid overdose; may repeat in 3 to 5 minutes if not effective. Call 911  Qty: 2 mL, Refills: 11      sacubitriL-valsartan (ENTRESTO) 24-26 mg per tablet Take 1 tablet by mouth 2 (two) times  daily.  Qty: 60 tablet, Refills: 3               I have seen and examined this patient within the last 30 days. My clinical findings that support the need for the home health skilled services and home bound status are the following:no   Medical restrictions requiring assistance of another human to leave home due to  Wound care needs.     Diet:   diabetic diet 2000 calorie    Labs:  Report Lab results to PCP.    Referrals/ Consults  Aide to provide assistance with personal care, ADLs, and vital signs.    Activities:   activity as tolerated    Nursing:   Agency to admit patient within 24 hours of hospital discharge unless specified on physician order or at patient request    SN to complete comprehensive assessment including routine vital signs. Instruct on disease process and s/s of complications to report to MD. Review/verify medication list sent home with the patient at time of discharge  and instruct patient/caregiver as needed. Frequency may be adjusted depending on start of care date.     Skilled nurse to perform up to 3 visits PRN for symptoms related to diagnosis    Notify MD if SBP > 160 or < 90; DBP > 90 or < 50; HR > 120 or < 50; Temp > 101; O2 < 88%; Other:      Ok to schedule additional visits based on staff availability and patient request on consecutive days within the home health episode.    When multiple disciplines ordered:    Start of Care occurs on Sunday - Wednesday schedule remaining discipline evaluations as ordered on separate consecutive days following the start of care.    Thursday SOC -schedule subsequent evaluations Friday and Monday the following week.     Friday - Saturday SOC - schedule subsequent discipline evaluations on consecutive days starting Monday of the following week.    For all post-discharge communication and subsequent orders please contact patient's primary care physician.       Home Health Aide:  Home Health Aide Three times weekly    Wound Care Orders  yes:  Wound Vac:    Location:  Left arm           Dressing changes every Monday, Wednesday and Friday.        ET Consult    Prior to wound vac placement patient will need 3 times weekly visits for wet to dry dressing changes.   Medihoney to LUE wound and wet to dry dressing overlaying.     I certify that this patient is confined to her home and needs intermittent skilled nursing care.          no

## 2023-09-28 NOTE — ASSESSMENT & PLAN NOTE
57 y.o. Black or  Female ESRD-HD T-T-S  presents to ED on 9/27/2023 with diagnosis of: Infection of AV graft for dialysis [T82.7XXA];Wound dehiscence [T81.30XA] sp AVG excision with Vascular this admission.   Nephrology consulted for inpatient ESRD-HD management    Outpatient HD Information:  -Dialysis modality: Hemodialysis  -Outpatient HD unit: [facility]  -Nephrologist: Alvarado BRAGA  -HD TX days: Tuesday/Thursday/Saturday, duration of treatment: 3 hrs  -Last HD TX prior to hospital admission: 09/26/23  -Dialysis access: dialysis catheter   -Residual urine: Minium   -EDW: 40.4 kg     Assessment:   - Will provide dialysis today (09/28/2023) with UF - 1-2L as BP tolerates for metabolic clearance and volume management   - Labs reviewed and dialysate to be adjusted to current labs.   - Will obtain OP dialysis records if here longer than 1-2 days   - Continue to monitor intake and output  - Please avoid gadolinium, fleets, phos-based laxatives, NSAIDs  - Dialysis thrice weekly unless more urgent indications arise. Will evaluate RRT requirements Daily.    Anemia of ESRD   Recent Labs   Lab 09/28/23  0601   WBC 5.56   HGB 11.0*   HCT 36.3*   *     Lab Results   Component Value Date    FESATURATED 42 07/19/2023    FERRITIN 948 (H) 07/19/2023       - Goal in ESRD is Hgb of 10-11. At goal.   - EPO can be administered and dosed per his OP unit upon discharge.    Mineral Bone Disease in ESRD   Lab Results   Component Value Date    PTH >2,500.0 (H) 02/17/2023    CALCIUM 7.8 (L) 09/13/2023    ALBUMIN 2.2 (L) 09/13/2023    PHOS 6.8 (H) 09/28/2023       - F/U PO4, Mg, Calcium. And albumin levels daily.   - Renal diet with protein intake goal 1.5 g/kg/d with 1 L fluid restriction   - Novasource with meals  - Restart home phos binder. Phos 6.8.

## 2023-09-28 NOTE — HPI
The patient is a 57 y.o. Black or  Female with multiple co morbidities including ESRD on HD who now sp L AVG excision and wound vacuum placement on 9/28/23 due to history of infected AV graft. The patient is TuThSat dialysis patient of Dr. Childers at [facility]. Last HD on Tuesday via L CVC. Nephrology consulted for management of ESRD and HD treatment.

## 2023-09-28 NOTE — ASSESSMENT & PLAN NOTE
Ms. Bloom is a 57-year old female with history of infected AV graft who is now s/p L AVG excision and wound vac placement on 9/28.  -OK for diet  -OK to ambulate  -Maintain wound vac, 125mmHg continuous suction  -Nephrology consulted for assistance w wv  -Patient will need WV at home    -Dispo: Pending WV delivery

## 2023-09-28 NOTE — NURSING
Patient IV not flushing, patient requesting not to have another IV placed, DENNY Garcia notified and TED Robledo

## 2023-09-28 NOTE — PLAN OF CARE
Problem: Device-Related Complication Risk (Hemodialysis)  Goal: Safe, Effective Therapy Delivery  Outcome: Ongoing, Progressing     Dialysis tx ended to the left chest perm cath, saline locked, capped, sterile dressing applied.    Net fluid removed: 500ml. Pt b/p low during tx, MANUEL Reese made aware.    Unable to reach nurse for report, pt transported from [facility] to room 507 by stretcher.

## 2023-09-28 NOTE — PLAN OF CARE
Message left for Nelsy @ [facility] regarding status of patient's wound vac. Awaiting call back at this time.      Jeanne Barney RNCM  Case Management  [facility]  [phone]

## 2023-09-28 NOTE — PLAN OF CARE
Patient resting in bed with eyes closed. Chest rising and falling with ease. Patient awaken to environmental stimuli. Vital signs obtained and stable. PM assessment completed. Wound vac intact to left upper arm. Patient positioned upright to eat. Bed in lowest position. Call light in reach. Monitoring ongoing.     Problem: Infection  Goal: Absence of Infection Signs and Symptoms  Outcome: Ongoing, Progressing     Problem: Adult Inpatient Plan of Care  Goal: Plan of Care Review  Outcome: Ongoing, Progressing  Goal: Patient-Specific Goal (Individualized)  Outcome: Ongoing, Progressing  Goal: Absence of Hospital-Acquired Illness or Injury  Outcome: Ongoing, Progressing  Goal: Optimal Comfort and Wellbeing  Outcome: Ongoing, Progressing  Goal: Readiness for Transition of Care  Outcome: Ongoing, Progressing     Problem: Impaired Wound Healing  Goal: Optimal Wound Healing  Outcome: Ongoing, Progressing     Problem: Skin Injury Risk Increased  Goal: Skin Health and Integrity  Outcome: Ongoing, Progressing

## 2023-09-28 NOTE — PLAN OF CARE
09/28/23 0808   Post-Acute Status   Post-Acute Authorization Home Health   Home Health Status Referrals Sent   Discharge Plan   Discharge Plan A Home with family;Home Health     Referrals sent to [facility] for post acute care.      [facility] accepted.      Jeanne Barney RNCM  Case Management  [facility]  [phone]

## 2023-09-28 NOTE — CONSULTS
[facility]  Wound Care    Patient Name:  Eva Bloom   MRN:  [id number]  Date: 9/28/2023  Diagnosis: Arteriovenous graft infection    History:     Past Medical History:   Diagnosis Date    Anemia     Anticoagulant long-term use     Atrial fibrillation     Bronchitis 03/01/2017    Cancer 2016    kidney cancer    CHF (congestive heart failure), NYHA class II, chronic, systolic     CMV (cytomegalovirus) antibody positive     Encounter for blood transfusion     ESRD (end stage renal disease)     Essential hypertension 09/23/2015    H/O herpes simplex type 2 infection     Herpes simplex type 1 antibody positive     History of kidney cancer     s/p left nephrectomy 1/2016    Hyperparathyroidism, unspecified     Hyperuricemia without signs of inflammatory arthritis and tophaceous disease     Kidney stones     LGSIL (low grade squamous intraepithelial dysplasia)     Myocardiopathy 07/21/2017    Prediabetes     Proteinuria     Renal disorder     Thyroid nodule     Urate nephropathy        Social History     Socioeconomic History    Marital status:     Number of children: 2   Occupational History    Occupation: Oravel     Employer: [id number]   Tobacco Use    Smoking status: Never     Passive exposure: Never    Smokeless tobacco: Never   Substance and Sexual Activity    Alcohol use: No    Drug use: Yes     Types: Hydrocodone    Sexual activity: Not Currently     Social Determinants of Health     Financial Resource Strain: Low Risk  (9/28/2023)    Overall Financial Resource Strain (CARDIA)     Difficulty of Paying Living Expenses: Not hard at all   Food Insecurity: No Food Insecurity (9/28/2023)    Hunger Vital Sign     Worried About Running Out of Food in the Last Year: Never true     Ran Out of Food in the Last Year: Never true   Transportation Needs: No Transportation Needs (9/28/2023)    PRAPARE - Transportation     Lack of Transportation (Medical): No     Lack of Transportation (Non-Medical): No    Physical Activity: Inactive (9/28/2023)    Exercise Vital Sign     Days of Exercise per Week: 0 days     Minutes of Exercise per Session: 0 min   Stress: No Stress Concern Present (9/28/2023)    [facility] - Occupational Stress Questionnaire     Feeling of Stress : Not at all   Social Connections: Moderately Integrated (9/28/2023)    Social Connection and Isolation Panel [NHANES]     Frequency of Communication with Friends and Family: More than three times a week     Frequency of Social Gatherings with Friends and Family: More than three times a week     Attends Jew Services: More than 4 times per year     Active Member of Clubs or Organizations: No     Attends Club or Organization Meetings: Never     Marital Status:    Housing Stability: Low Risk  (9/28/2023)    Housing Stability Vital Sign     Unable to Pay for Housing in the Last Year: No     Number of Places Lived in the Last Year: 1     Unstable Housing in the Last Year: No       Precautions:     Allergies as of 09/22/2023 - Reviewed 09/22/2023   Allergen Reaction Noted    Coreg [carvedilol] Other (See Comments) 05/17/2017    Allopurinol  07/17/2012       WO Assessment Details/Treatment     Patient seen for wound care consultation for RLE.   Reviewed chart for this encounter.   See Flow Sheet for findings.    Pt found lying in bed, agreeable to care at this time. Pt has x3 areas of intact, dry scabbing to her R thigh and knee. Pt states her knee wound was from a fall, unsure what caused other wounds. Betadine applied at this time.     RECOMMENDATIONS:  Daily - RLE scabs - cleanse gently w/ NS, pat dry. Apply Betadine and leave CORNELL.    Discussed POC with patient and primary nurse.   See EMR for orders & patient education.    Nursing to continue care & monitoring.  Nursing to maintain pressure injury prevention interventions.       09/28/23 1047   WOCN Assessment   WOCN Total Time (mins) 15   Visit Date 09/28/23    Visit Time 1047   Consult Type New   [facility] Speciality Wound   Intervention changed;assessed;applied;chart review;coordination of care;orders   Teaching on-going        Altered Skin Integrity 09/27/23 1810 Right anterior;lateral Knee #1 Partial thickness tissue loss. Shallow open ulcer with a red or pink wound bed, without slough. Intact or Open/Ruptured Serum-filled blister.   Date First Assessed/Time First Assessed: 09/27/23 1810   Altered Skin Integrity Present on Admission - Did Patient arrive to the hospital with altered skin?: yes  Side: Right  Orientation: anterior;lateral  Location: Knee  Wound Number: #1  Is this in...   Wound Image    Dressing Appearance Open to air   Drainage Amount None   Drainage Characteristics/Odor No odor   Appearance Dry;Intact;Tan   Periwound Area Intact;Dry   Care Applied:;Povidone iodine        Altered Skin Integrity 09/27/23 1810 Right anterior;lower Leg #2 Skin Tear Partial thickness tissue loss. Shallow open ulcer with a red or pink wound bed, without slough. Intact or Open/Ruptured Serum-filled blister.   Date First Assessed/Time First Assessed: 09/27/23 1810   Altered Skin Integrity Present on Admission - Did Patient arrive to the hospital with altered skin?: yes  Side: Right  Orientation: anterior;lower  Location: Leg  Wound Number: #2  Is this injur...   Wound Image     Dressing Appearance Open to air   Drainage Amount None   Drainage Characteristics/Odor No odor   Appearance Dry;Intact;Tan   Tissue loss description Not applicable   Periwound Area Intact;Dry   Care Applied:;Povidone iodine

## 2023-09-29 VITALS
TEMPERATURE: 97 F | OXYGEN SATURATION: 97 % | WEIGHT: 90 LBS | HEIGHT: 64 IN | HEART RATE: 73 BPM | RESPIRATION RATE: 16 BRPM | SYSTOLIC BLOOD PRESSURE: 108 MMHG | BODY MASS INDEX: 15.36 KG/M2 | DIASTOLIC BLOOD PRESSURE: 70 MMHG

## 2023-09-29 PROCEDURE — 94761 N-INVAS EAR/PLS OXIMETRY MLT: CPT

## 2023-09-29 PROCEDURE — 94799 UNLISTED PULMONARY SVC/PX: CPT

## 2023-09-29 PROCEDURE — 25000003 PHARM REV CODE 250

## 2023-09-29 RX ORDER — SODIUM CHLORIDE 9 MG/ML
INJECTION, SOLUTION INTRAVENOUS ONCE
Status: DISCONTINUED | OUTPATIENT
Start: 2023-09-30 | End: 2023-09-29 | Stop reason: HOSPADM

## 2023-09-29 RX ADMIN — OXYCODONE HYDROCHLORIDE 10 MG: 10 TABLET ORAL at 04:09

## 2023-09-29 RX ADMIN — METOPROLOL SUCCINATE 50 MG: 25 TABLET, EXTENDED RELEASE ORAL at 08:09

## 2023-09-29 RX ADMIN — ASPIRIN 81 MG CHEWABLE TABLET 81 MG: 81 TABLET CHEWABLE at 08:09

## 2023-09-29 RX ADMIN — APIXABAN 2.5 MG: 2.5 TABLET, FILM COATED ORAL at 08:09

## 2023-09-29 RX ADMIN — SACUBITRIL AND VALSARTAN 1 TABLET: 49; 51 TABLET, FILM COATED ORAL at 08:09

## 2023-09-29 RX ADMIN — LEVOTHYROXINE SODIUM 75 MCG: 25 TABLET ORAL at 08:09

## 2023-09-29 NOTE — PROGRESS NOTES
[facility]  Vascular Surgery  Progress Note    Patient Name: Eva Bolom  MRN: [id number]  Admission Date: 9/27/2023  Primary Care Provider: Sowmya Mccain MD    Subjective:     Interval History:  Patient seen and examined.  Currently postoperative day 3 status post excision of AV graft remnant and placement of wound VAC.  Continues to be awaiting insurance and dispo per wound VAC set up.  No acute issues.    Post-Op Info:  Procedure(s) (LRB):  EXCISION, AV FISTULA (Left)   2 Days Post-Op      Medications:  Continuous Infusions:   sodium chloride 0.9%       Scheduled Meds:   [START ON 9/30/2023] sodium chloride 0.9%   Intravenous Once    apixaban  2.5 mg Oral BID    aspirin  81 mg Oral Daily    levothyroxine  75 mcg Oral Daily    metoprolol succinate  50 mg Oral Daily    sacubitriL-valsartan  1 tablet Oral BID     PRN Meds:acetaminophen, melatonin, ondansetron, oxyCODONE, oxyCODONE     Objective:     Vital Signs (Most Recent):  Temp: 97.7 °F (36.5 °C) (09/29/23 1201)  Pulse: 77 (09/29/23 1201)  Resp: 18 (09/29/23 1201)  BP: 118/75 (09/29/23 1201)  SpO2: 97 % (09/29/23 1201) Vital Signs (24h Range):  Temp:  [97.2 °F (36.2 °C)-98.2 °F (36.8 °C)] 97.7 °F (36.5 °C)  Pulse:  [72-78] 77  Resp:  [16-20] 18  SpO2:  [93 %-98 %] 97 %  BP: (105-126)/(64-77) 118/75         Physical Exam  Wound vac in place holding suction.     Significant Labs:  All pertinent labs from the last 24 hours have been reviewed.    Significant Diagnostics:  I have reviewed all pertinent imaging results/findings within the past 24 hours.    Assessment/Plan:     Active Diagnoses:    Diagnosis Date Noted POA    PRINCIPAL PROBLEM:  Arteriovenous graft infection [T82.7XXA] 01/24/2023 Yes     Chronic    Body mass index (BMI) less than 19 [Z68.1] 09/28/2023 Not Applicable    Hypothyroidism [E03.9] 07/19/2023 Yes     Chronic    PAF (paroxysmal atrial fibrillation) [I48.0] 07/25/2022 Yes     Chronic    Anemia in ESRD (end-stage renal disease)  [N18.6, D63.1]  Yes     Chronic    Chronic combined systolic and diastolic congestive heart failure [I50.42]  Yes    ESRD (end stage renal disease) on dialysis [N18.6, Z99.2]  Not Applicable     Chronic    Essential hypertension [I10] 09/23/2015 Yes     Chronic      Problems Resolved During this Admission:     Dispo pending wound vac set up still awaiting now POD#3.     Wound measurement: 2 cm x 1.5cm x 5mm (deep)    Augustin Pacheco MD  Vascular Surgery  [facility] - Surgery

## 2023-09-29 NOTE — PLAN OF CARE
Patient resting in bed, awake and alert. No distress or discomfort noted. Vital signs obtained and stable. PM assessment completed. Wound vac intact to left upper arm. All needs addressed. Spouse at bedside. Monitoring ongoing.     Problem: Infection  Goal: Absence of Infection Signs and Symptoms  Outcome: Ongoing, Progressing     Problem: Adult Inpatient Plan of Care  Goal: Plan of Care Review  Outcome: Ongoing, Progressing  Goal: Patient-Specific Goal (Individualized)  Outcome: Ongoing, Progressing  Goal: Absence of Hospital-Acquired Illness or Injury  Outcome: Ongoing, Progressing  Goal: Optimal Comfort and Wellbeing  Outcome: Ongoing, Progressing  Goal: Readiness for Transition of Care  Outcome: Ongoing, Progressing     Problem: Impaired Wound Healing  Goal: Optimal Wound Healing  Outcome: Ongoing, Progressing     Problem: Skin Injury Risk Increased  Goal: Skin Health and Integrity  Outcome: Ongoing, Progressing     Problem: Device-Related Complication Risk (Hemodialysis)  Goal: Safe, Effective Therapy Delivery  Outcome: Ongoing, Progressing     Problem: Hemodynamic Instability (Hemodialysis)  Goal: Effective Tissue Perfusion  Outcome: Ongoing, Progressing     Problem: Infection (Hemodialysis)  Goal: Absence of Infection Signs and Symptoms  Outcome: Ongoing, Progressing     Problem: Fall Injury Risk  Goal: Absence of Fall and Fall-Related Injury  Outcome: Ongoing, Progressing

## 2023-09-29 NOTE — NURSING
Patient discharged. Discharge instructions provided. Wound vac removed and wet to dry dressing was place by charge nurse Gracie. Patient discharged with Dialysis catheter in place CDI. Patient discharged to home with . Patient and  aware that the wound vac will be delivered to their residence.

## 2023-09-29 NOTE — PLAN OF CARE
Awaiting updated wound vac form from team to send to [facility] for processing. Will continue to follow for needs.      1PM-Spoke with [facility] rep who reports that wound vac order remains pending and has not been issued for delivery.     245PM-[facility] rep reports insurance requesting updated wound measurements from the team. Measurements faxed as requested. Team would like to d/c patient and requested that Vac be delivered to patient's home whenever able. Nurse will remove vac and apply medi honey wet to dry dressing. HH orders updated for patient to do wound care until Vac arrives. [facility] following and will have a nurse apply wound vac on Sunday if able. Otherwise patient instructed to come to clinic to have wound vac applied on Monday. HH orders updated per team.    Jeanne LOW  Case Management  [facility]  [phone]

## 2023-09-30 NOTE — PLAN OF CARE
[facility]  Discharge Final Note    Primary Care Provider: Sowmya Mccain MD    Expected Discharge Date: 9/29/2023    Final Discharge Note (most recent)       Final Note - 09/30/23 0903          Final Note    Assessment Type Final Discharge Note (P)      Anticipated Discharge Disposition Home or Self Care (P)         Post-Acute Status    Post-Acute Authorization Home Health;HME (P)      HME Status Set-up Complete/Auth obtained (P)      Home Health Status Set-up Complete/Auth obtained (P)      Discharge Delays None known at this time (P)                      Important Message from Medicare           Patient discharged home with wound vac from [facility] for wound vac changes. No post acute needs noted.      Jessica Lawton LMSW   Pediatric/PICU    [facility]  [phone]

## 2023-10-02 ENCOUNTER — PATIENT OUTREACH (OUTPATIENT)
Dept: ADMINISTRATIVE | Facility: CLINIC | Age: 58
End: 2023-10-02
Payer: MEDICARE

## 2023-10-02 ENCOUNTER — OFFICE VISIT (OUTPATIENT)
Dept: PODIATRY | Facility: CLINIC | Age: 58
End: 2023-10-02
Payer: MEDICARE

## 2023-10-02 ENCOUNTER — PES CALL (OUTPATIENT)
Dept: HOME HEALTH SERVICES | Facility: CLINIC | Age: 58
End: 2023-10-02
Payer: MEDICARE

## 2023-10-02 VITALS — BODY MASS INDEX: 15.36 KG/M2 | HEIGHT: 64 IN | WEIGHT: 90 LBS

## 2023-10-02 DIAGNOSIS — I73.9 PAD (PERIPHERAL ARTERY DISEASE): ICD-10-CM

## 2023-10-02 DIAGNOSIS — L97.529 ISCHEMIC TOE ULCER, LEFT, WITH UNSPECIFIED SEVERITY: Primary | ICD-10-CM

## 2023-10-02 PROCEDURE — 87075 CULTR BACTERIA EXCEPT BLOOD: CPT | Performed by: PODIATRIST

## 2023-10-02 PROCEDURE — 99213 OFFICE O/P EST LOW 20 MIN: CPT | Mod: PBBFAC,PO | Performed by: PODIATRIST

## 2023-10-02 PROCEDURE — 99213 OFFICE O/P EST LOW 20 MIN: CPT | Mod: S$PBB,,, | Performed by: PODIATRIST

## 2023-10-02 PROCEDURE — 99999 PR PBB SHADOW E&M-EST. PATIENT-LVL III: ICD-10-PCS | Mod: PBBFAC,,, | Performed by: PODIATRIST

## 2023-10-02 PROCEDURE — 87070 CULTURE OTHR SPECIMN AEROBIC: CPT | Performed by: PODIATRIST

## 2023-10-02 PROCEDURE — 99213 PR OFFICE/OUTPT VISIT, EST, LEVL III, 20-29 MIN: ICD-10-PCS | Mod: S$PBB,,, | Performed by: PODIATRIST

## 2023-10-02 PROCEDURE — 99999 PR PBB SHADOW E&M-EST. PATIENT-LVL III: CPT | Mod: PBBFAC,,, | Performed by: PODIATRIST

## 2023-10-02 RX ORDER — DOXYCYCLINE 100 MG/1
100 TABLET ORAL 2 TIMES DAILY
Qty: 20 TABLET | Refills: 0 | Status: SHIPPED | OUTPATIENT
Start: 2023-10-02 | End: 2023-10-10

## 2023-10-02 NOTE — HOSPITAL COURSE
Eva Bloom presented to [facility] [place] [facility] on the morning of 9/27/2023 for planned AVG excision . The procedure was performed without complication and she was transferred to the floor for further post op care and monitoring. Her postoperative course was uneventful and she progressed according to plan.  Her pain was controlled with a combination of IV and PO multimodal pain medications. Her diet has been advanced throughout her hospital stay. She is now ambulating without assistance, tolerating a regular diet, pain is well controlled with PO pain medication, and she is voiding appropriately. She now meets all criteria for discharge. Discharged with close follow up for wound vac placement in clinic.

## 2023-10-02 NOTE — PROGRESS NOTES
Subjective:      Patient ID: Eva Bloom is a 58 y.o. female.    Chief Complaint: Follow-up (Wound 3rd digit)    Ulcer/gangrene left 4th toe and right 1st toe.  Gradual onset, improving slowly over past 2 weeks, aggravated by increased weight bearing, shoe gear, pressure.  Betadine swabs, open toe shoes, angioplasty all helping.. Denies trauma, surgery.    7/27/2023 angioplasty successfuly        Review of Systems   Constitutional: Negative for chills, diaphoresis, fever, malaise/fatigue and night sweats.   Cardiovascular:  Positive for leg swelling. Negative for claudication, cyanosis and syncope.   Skin:  Positive for poor wound healing. Negative for color change, dry skin, nail changes, rash, suspicious lesions and unusual hair distribution.   Musculoskeletal:  Negative for falls, joint pain, joint swelling, muscle cramps, muscle weakness and stiffness.   Gastrointestinal:  Negative for constipation, diarrhea, nausea and vomiting.   Neurological:  Positive for sensory change. Negative for brief paralysis, disturbances in coordination, focal weakness, numbness, paresthesias and tremors.           Objective:      Physical Exam  Constitutional:       General: She is not in acute distress.     Appearance: She is well-developed. She is not diaphoretic.   Cardiovascular:      Pulses:           Dorsalis pedis pulses are 1+ on the right side and 1+ on the left side.        Posterior tibial pulses are 1+ on the right side and 1+ on the left side.      Comments: Capillary refill 3 seconds all toes/distal feet, all toes/both feet warm to touch.      Negative lymphadenopathy bilateral popliteal fossa and tarsal tunnel.      <2+ pitting lower extremity edema bilateral.    Musculoskeletal:      Right ankle: No swelling, deformity, ecchymosis or lacerations. Normal range of motion. Normal pulse.      Right Achilles Tendon: Normal. No defects. Manning's test negative.   Lymphadenopathy:      Lower Body: No right inguinal  adenopathy. No left inguinal adenopathy.      Comments: Negative lymphadenopathy bilateral popliteal fossa and tarsal tunnel.    Negative lymphangitic streaking bilateral feet/ankles/legs.   Skin:     General: Skin is warm and dry.      Capillary Refill: Capillary refill takes 2 to 3 seconds.      Coloration: Skin is not pale.      Findings: No abrasion, bruising, burn, ecchymosis, erythema, laceration, lesion or rash.      Nails: There is no clubbing.      Comments:   Focal area dorsal medial left 4th toe pipj dry indurated gangrene demarcating  without ulceration, drainage, pus, tracking, fluctuance, malodor, or cardinal signs infection.       Focal spot of dorsal right halulx proximal nail bed demarcating focal fdry indurated gangrene  without ulceration, drainage, pus, tracking, fluctuance, malodor, or cardinal signs infection.      Otherwise, Skin thin, shiny, atrophic, with decreased density and distribution of pedal hair bilateral, but without hyperpigmentation, daniele discoloration,  ulcers, masses, nodules or cords palpated bilateral feet and legs.    Neurological:      Mental Status: She is alert and oriented to person, place, and time.      Sensory: No sensory deficit.      Motor: No tremor, atrophy or abnormal muscle tone.      Gait: Gait normal.      Deep Tendon Reflexes:      Reflex Scores:       Patellar reflexes are 2+ on the right side and 2+ on the left side.       Achilles reflexes are 2+ on the right side and 2+ on the left side.     Comments: Negative tinel sign to percussion sural, superficial peroneal, deep peroneal, saphenous, and posterior tibial nerves right and left ankles and feet.     Psychiatric:         Behavior: Behavior is cooperative.             Assessment:       Encounter Diagnoses   Name Primary?    Ischemic toe ulcer, left, with unspecified severity Yes    PAD (peripheral artery disease)          Plan:       Eva was seen today for follow-up.    Diagnoses and all orders for this  visit:    Ischemic toe ulcer, left, with unspecified severity  -     Aerobic culture  -     Culture, Anaerobic    PAD (peripheral artery disease)  -     Aerobic culture  -     Culture, Anaerobic    Other orders  -     doxycycline monohydrate 100 mg Tab; Take 1 tablet (100 mg total) by mouth 2 (two) times daily.      I counseled the patient on her conditions, their implications and medical management.    Cultures some purulent drainage from wound periphery.    doxycycline    Continue betadine swabs daily left 4th toe and right 1st toe.  Cover with band aid if desired.  Continue open toe shoe (surgical shoes bilateral today)    Keep all vascualr follow up.    Continue HH.    1 week, assessment/evaluation, sooner prn.          No follow-ups on file.

## 2023-10-02 NOTE — DISCHARGE SUMMARY
"[facility]  Vascular Surgery  Discharge Summary      Patient Name: Eva Bloom  MRN: [id number]  Admission Date: 9/27/2023  Hospital Length of Stay: 1 days  Discharge Date and Time: 9/29/2023  6:00 PM  Attending Physician: No att. providers found   Discharging Provider: Liset Pickard MD  Primary Care Provider: Sowmya Mccain MD    HPI:   No notes on file    Procedure(s) (LRB):  EXCISION, AV FISTULA (Left)     Hospital Course: [place] [facility] [place] of 9/27/2023 for planned AVG excision . The procedure was performed without complication and she was transferred to the floor for further post op care and monitoring. Her postoperative course was uneventful and she progressed according to plan.  Her pain was controlled with a combination of IV and PO multimodal pain medications. Her diet has been advanced throughout her hospital stay. She is now ambulating without assistance, tolerating a regular diet, pain is well controlled with PO pain medication, and she is voiding appropriately. She now meets all criteria for discharge. Discharged with close follow up for wound vac placement in clinic.                Goals of Care Treatment Preferences:  Code Status: Full Code          What is most important right now is to focus on symptom/pain control, extending life as long as possible, even it it means sacrificing quality, curative/life-prolongation (regardless of treatment burdens).  Accordingly, we have decided that the best plan to meet the patient's goals includes continuing with treatment.      Consults:   Consults (From admission, onward)        Status Ordering Provider     Inpatient consult to Nephrology  Once        Provider:  (Not yet assigned)    Completed MATHEW BURGESS          Significant Diagnostic Studies: Labs: CMP No results for input(s): "NA", "K", "CL", "CO2", "GLU", "BUN", "CREATININE", "CALCIUM", "PROT", "ALBUMIN", "BILITOT", "ALKPHOS", "AST", "ALT", "ANIONGAP", " ""ESTGFRAFRICA", "EGFRNONAA" in the last 48 hours. and CBC No results for input(s): "WBC", "HGB", "HCT", "PLT" in the last 48 hours.    Pending Diagnostic Studies:     None        Final Active Diagnoses:    Diagnosis Date Noted POA    PRINCIPAL PROBLEM:  Arteriovenous graft infection [T82.7XXA] 01/24/2023 Yes     Chronic    Body mass index (BMI) less than 19 [Z68.1] 09/28/2023 Not Applicable    Hypothyroidism [E03.9] 07/19/2023 Yes     Chronic    PAF (paroxysmal atrial fibrillation) [I48.0] 07/25/2022 Yes     Chronic    Anemia in ESRD (end-stage renal disease) [N18.6, D63.1]  Yes     Chronic    Chronic combined systolic and diastolic congestive heart failure [I50.42]  Yes    ESRD (end stage renal disease) on dialysis [N18.6, Z99.2]  Not Applicable     Chronic    Essential hypertension [I10] 09/23/2015 Yes     Chronic      Problems Resolved During this Admission:      Discharged Condition: good    Disposition: Home or Self Care    Follow Up:    In clinic Monday.   Patient Instructions:      Ambulatory referral/consult to [facility]   Standing Status: Future   Referral Priority: Routine Referral Type: Consultation   Referral Reason: Specialty Services Required   Number of Visits Requested: 1     Notify your health care provider if you experience any of the following:  temperature >100.4     Notify your health care provider if you experience any of the following:  persistent nausea and vomiting or diarrhea     Notify your health care provider if you experience any of the following:  severe uncontrolled pain     Notify your health care provider if you experience any of the following:  redness, tenderness, or signs of infection (pain, swelling, redness, odor or green/yellow discharge around incision site)     Change dressing (specify)   Order Comments: Dressing change: 1 times per day using medi honey and a wet 4x4 covered with a dry 4x4 and tape.     Activity as tolerated "     Medications:  Reconciled Home Medications:      Medication List      CONTINUE taking these medications    acetaminophen 500 MG tablet  Commonly known as: TYLENOL  Take 500 mg by mouth every 6 (six) hours as needed for Pain.     apixaban 2.5 mg Tab  Commonly known as: ELIQUIS  Take 1 tablet (2.5 mg total) by mouth 2 (two) times daily.     aspirin 81 MG Chew  Take 1 tablet (81 mg total) by mouth once daily.     CABOMETYX 60 mg Tab  Generic drug: cabozantinib  TAKE ONE TABLET BY MOUTH ONCE DAILY AT THE SAME TIME ON AN EMPTY STOMACH AT LEAST 1 HOUR BEFORE OR 2 HOURS AFTER EATING. AVOID GRAPEFRUIT PRODUCTS     cloNIDine 0.3 mg/24 hr td ptwk 0.3 mg/24 hr  Commonly known as: CATAPRES  Place 1 patch onto the skin every 7 days.     cyclobenzaprine 5 MG tablet  Commonly known as: FLEXERIL  TAKE 1 TABLET BY MOUTH DAILY AS NEEDED FOR MUSCLE SPASMS.     fluticasone propionate 50 mcg/actuation nasal spray  Commonly known as: FLONASE  1 spray (50 mcg total) by Each Nostril route 2 (two) times daily as needed (ear fullness).     FosrenoL 1000 MG chewable tablet  Generic drug: lanthanum  Take 1 tablet by mouth.     hydrALAZINE 100 MG tablet  Commonly known as: APRESOLINE  Take 1 tablet (100 mg total) by mouth every 12 (twelve) hours.     HYDROcodone-acetaminophen 5-325 mg per tablet  Commonly known as: NORCO  Take 1 tablet by mouth every 6 (six) hours as needed for Pain.     levothyroxine 75 MCG tablet  Commonly known as: SYNTHROID  Take 1 tablet (75 mcg total) by mouth once daily.     LIDOcaine-prilocaine cream  Commonly known as: EMLA  APPLY ATLEAST 30 MINUTES BEFORE TREATMENT 3 TIMES A WEEK     metoprolol succinate 100 MG 24 hr tablet  Commonly known as: TOPROL-XL  Take 1/2 tablet (50mg) once daily     naloxone 1 mg/mL injection  Commonly known as: NARCAN  2 mg (1 mg per nostril) by Nasal route as needed for opioid overdose; may repeat in 3 to 5 minutes if not effective. Call 911     ONE-A-DAY WOMEN'S 50 PLUS 400-20 mcg  Tab  Generic drug: mv,Ca,min-folic acid-vit K1  Take 1 tablet by mouth once daily.     RETACRIT INJ  Epoetin david - epbx (Retacrit)     * sacubitriL-valsartan 24-26 mg per tablet  Commonly known as: ENTRESTO  Take 1 tablet by mouth 2 (two) times daily.     * ENTRESTO 49-51 mg per tablet  Generic drug: sacubitriL-valsartan  TAKE 1 TABLET BY MOUTH TWICE A DAY         * This list has 2 medication(s) that are the same as other medications prescribed for you. Read the directions carefully, and ask your doctor or other care provider to review them with you.                Liset Pickard MD  Vascular Surgery  [facility] - Surgery

## 2023-10-03 ENCOUNTER — PATIENT MESSAGE (OUTPATIENT)
Dept: FAMILY MEDICINE | Facility: CLINIC | Age: 58
End: 2023-10-03
Payer: MEDICARE

## 2023-10-03 NOTE — PROGRESS NOTES
C3 nurse spoke with Eva Bloom's  for a TCC post hospital discharge follow up call. The patient has a scheduled HOSFU appointment per Home NP visit with Jeramie Smith on 10/11/23 @ 0800.

## 2023-10-04 ENCOUNTER — TELEPHONE (OUTPATIENT)
Dept: VASCULAR SURGERY | Facility: CLINIC | Age: 58
End: 2023-10-04
Payer: MEDICARE

## 2023-10-04 ENCOUNTER — DOCUMENT SCAN (OUTPATIENT)
Dept: HOME HEALTH SERVICES | Facility: HOSPITAL | Age: 58
End: 2023-10-04
Payer: MEDICARE

## 2023-10-04 NOTE — TELEPHONE ENCOUNTER
Received call from FELIPE Bustamante with [facility]  stating orders are needed for wound vac dressing changes. States the order only states wound vac should be changed MWF. Discussed with GUSTAVO Robledo NP and contacted Robby with orders to change wound vac MWF using black sponge and to set vac to 125 mmHg continuous suction. Robby verbalized understanding.

## 2023-10-05 ENCOUNTER — TELEPHONE (OUTPATIENT)
Dept: VASCULAR SURGERY | Facility: CLINIC | Age: 58
End: 2023-10-05
Payer: MEDICARE

## 2023-10-05 LAB — BACTERIA SPEC AEROBE CULT: NO GROWTH

## 2023-10-05 NOTE — TELEPHONE ENCOUNTER
Contacted pt's  Otis to reschedule appt with Dr. Muñoz to sooner date. Appointment scheduled,  verified. Appointment letter placed in mail.   ----- Message from FARIDEH Muñoz III, MD sent at 10/4/2023 12:48 PM CDT -----  2 weeks  ----- Message -----  From: Adriana De La Rosa RN  Sent: 10/4/2023  12:20 PM CDT  To: FARIDEH Muñoz III, MD    I didn't get any orders for Mrs. Bloom's post-op visit with you? When do you want her to come back? HH is changing her wound vac MWF.

## 2023-10-06 ENCOUNTER — TELEPHONE (OUTPATIENT)
Dept: VASCULAR SURGERY | Facility: CLINIC | Age: 58
End: 2023-10-06
Payer: MEDICARE

## 2023-10-06 NOTE — TELEPHONE ENCOUNTER
Contacted Nancy in response to message. Nancy states suction from wound vac is causing pt to have severe pain and also states pt's wound is at surface level. Discussed with intern on call Dr. HAROLDO Alexander and contacted Nancy with orders to D/C wound vac and apply wet to dry dressing daily to wound. Nancy repeated instructions and verbalized understanding.----- Message from Luclile Delgado sent at 10/6/2023 12:52 PM CDT -----  Regarding: Pt advice  Contact: Nancy sharma [facility]    Nancy sharma [facility] nurse is calling to speak with the provider nurse would like to discuss the pt wound vac. Please call

## 2023-10-09 ENCOUNTER — TELEPHONE (OUTPATIENT)
Dept: FAMILY MEDICINE | Facility: CLINIC | Age: 58
End: 2023-10-09
Payer: MEDICARE

## 2023-10-09 ENCOUNTER — TELEPHONE (OUTPATIENT)
Dept: HEMATOLOGY/ONCOLOGY | Facility: CLINIC | Age: 58
End: 2023-10-09
Payer: MEDICARE

## 2023-10-09 LAB — BACTERIA SPEC ANAEROBE CULT: NORMAL

## 2023-10-09 NOTE — TELEPHONE ENCOUNTER
----- Message from Cindy Schmidt sent at 10/9/2023  4:45 PM CDT -----  Regarding: Consult/Advisory      Name Of Caller:    Ms Otero w/ [facility]      Contact Preference:    [phone]      Nature of call:    Ms Otero would like a nurse to notify the pt that they are ready to schedule the cabozantinib (CABOMETYX) 60 mg Tab for delivery.

## 2023-10-09 NOTE — TELEPHONE ENCOUNTER
Please advise HH that I have personally not seen patient in almost a year- recommend they contact nephrology or oncology to discuss their concerns or schedule office visit with my NP for further evaluation.

## 2023-10-09 NOTE — TELEPHONE ENCOUNTER
----- Message from Arik Persaud MA sent at 10/9/2023 12:51 PM CDT -----  Type: Patient Call Back    Who called: [facility] - [facility]    What is the request in detail: her weight has dropped 4 pounds within the last 4 days .. 85 on Saturday.. today it's 81.4..    Can the clinic reply by [facility]?No    Would the patient rather a call back or a response via [facility]? Yes, call     Best call back number: [phone]

## 2023-10-09 NOTE — TELEPHONE ENCOUNTER
Spoke with Rose. Rose was notified of the message below. Rose states she will contact patient's  and let him know.

## 2023-10-10 ENCOUNTER — HOSPITAL ENCOUNTER (INPATIENT)
Facility: HOSPITAL | Age: 58
LOS: 8 days | Discharge: HOSPICE | DRG: 686 | End: 2023-10-18
Attending: EMERGENCY MEDICINE | Admitting: HOSPITALIST
Payer: MEDICARE

## 2023-10-10 DIAGNOSIS — N18.6 ESRD (END STAGE RENAL DISEASE): ICD-10-CM

## 2023-10-10 DIAGNOSIS — L97.529: ICD-10-CM

## 2023-10-10 DIAGNOSIS — R65.20 SEVERE SEPSIS: ICD-10-CM

## 2023-10-10 DIAGNOSIS — R07.9 CHEST PAIN: ICD-10-CM

## 2023-10-10 DIAGNOSIS — S91.109A OPEN TOE WOUND, INITIAL ENCOUNTER: ICD-10-CM

## 2023-10-10 DIAGNOSIS — A41.9 SEPSIS: ICD-10-CM

## 2023-10-10 DIAGNOSIS — T68.XXXA HYPOTHERMIA, INITIAL ENCOUNTER: Primary | ICD-10-CM

## 2023-10-10 DIAGNOSIS — A41.9 SEVERE SEPSIS: ICD-10-CM

## 2023-10-10 DIAGNOSIS — R63.4 WEIGHT LOSS: ICD-10-CM

## 2023-10-10 PROBLEM — R74.01 TRANSAMINITIS: Status: RESOLVED | Noted: 2022-10-29 | Resolved: 2023-10-10

## 2023-10-10 PROBLEM — I50.42 CHRONIC COMBINED SYSTOLIC AND DIASTOLIC HEART FAILURE: Status: ACTIVE | Noted: 2023-08-25

## 2023-10-10 LAB
ALBUMIN SERPL BCP-MCNC: 2.4 G/DL (ref 3.5–5.2)
ALLENS TEST: ABNORMAL
ALP SERPL-CCNC: 403 U/L (ref 55–135)
ALT SERPL W/O P-5'-P-CCNC: 37 U/L (ref 10–44)
ANION GAP SERPL CALC-SCNC: 17 MMOL/L (ref 8–16)
ANION GAP SERPL CALC-SCNC: 18 MMOL/L (ref 8–16)
ANISOCYTOSIS BLD QL SMEAR: SLIGHT
AST SERPL-CCNC: 106 U/L (ref 10–40)
BASOPHILS # BLD AUTO: 0.08 K/UL (ref 0–0.2)
BASOPHILS NFR BLD: 1.4 % (ref 0–1.9)
BILIRUB SERPL-MCNC: 1 MG/DL (ref 0.1–1)
BNP SERPL-MCNC: >4900 PG/ML (ref 0–99)
BUN SERPL-MCNC: 19 MG/DL (ref 6–20)
BUN SERPL-MCNC: 21 MG/DL (ref 6–30)
CALCIUM SERPL-MCNC: 8 MG/DL (ref 8.7–10.5)
CHLORIDE SERPL-SCNC: 101 MMOL/L (ref 95–110)
CHLORIDE SERPL-SCNC: 104 MMOL/L (ref 95–110)
CO2 SERPL-SCNC: 18 MMOL/L (ref 23–29)
CREAT SERPL-MCNC: 3 MG/DL (ref 0.5–1.4)
CREAT SERPL-MCNC: 3 MG/DL (ref 0.5–1.4)
DIFFERENTIAL METHOD: ABNORMAL
EOSINOPHIL # BLD AUTO: 0.1 K/UL (ref 0–0.5)
EOSINOPHIL NFR BLD: 2 % (ref 0–8)
ERYTHROCYTE [DISTWIDTH] IN BLOOD BY AUTOMATED COUNT: 22.4 % (ref 11.5–14.5)
EST. GFR  (NO RACE VARIABLE): 17 ML/MIN/1.73 M^2
GLUCOSE SERPL-MCNC: 73 MG/DL (ref 70–110)
GLUCOSE SERPL-MCNC: 73 MG/DL (ref 70–110)
HCT VFR BLD AUTO: 36 % (ref 37–48.5)
HCT VFR BLD CALC: 32 %PCV (ref 36–54)
HGB BLD-MCNC: 10.7 G/DL (ref 12–16)
IMM GRANULOCYTES # BLD AUTO: 0.02 K/UL (ref 0–0.04)
IMM GRANULOCYTES NFR BLD AUTO: 0.4 % (ref 0–0.5)
LACTATE SERPL-SCNC: 4.5 MMOL/L (ref 0.5–2.2)
LACTATE SERPL-SCNC: 5.3 MMOL/L (ref 0.5–2.2)
LYMPHOCYTES # BLD AUTO: 1.4 K/UL (ref 1–4.8)
LYMPHOCYTES NFR BLD: 24.3 % (ref 18–48)
MAGNESIUM SERPL-MCNC: 2.1 MG/DL (ref 1.6–2.6)
MCH RBC QN AUTO: 33.3 PG (ref 27–31)
MCHC RBC AUTO-ENTMCNC: 29.7 G/DL (ref 32–36)
MCV RBC AUTO: 112 FL (ref 82–98)
MONOCYTES # BLD AUTO: 0.2 K/UL (ref 0.3–1)
MONOCYTES NFR BLD: 3.6 % (ref 4–15)
NEUTROPHILS # BLD AUTO: 3.8 K/UL (ref 1.8–7.7)
NEUTROPHILS NFR BLD: 68.3 % (ref 38–73)
NRBC BLD-RTO: 1 /100 WBC
OVALOCYTES BLD QL SMEAR: ABNORMAL
PHOSPHATE SERPL-MCNC: 3.9 MG/DL (ref 2.7–4.5)
PLATELET # BLD AUTO: 210 K/UL (ref 150–450)
PMV BLD AUTO: 12.3 FL (ref 9.2–12.9)
POC IONIZED CALCIUM: 1 MMOL/L (ref 1.06–1.42)
POC TCO2 (MEASURED): 24 MMOL/L (ref 23–29)
POCT GLUCOSE: 96 MG/DL (ref 70–110)
POTASSIUM BLD-SCNC: 3.3 MMOL/L (ref 3.5–5.1)
POTASSIUM SERPL-SCNC: 3.7 MMOL/L (ref 3.5–5.1)
PROT SERPL-MCNC: 5.7 G/DL (ref 6–8.4)
RBC # BLD AUTO: 3.21 M/UL (ref 4–5.4)
SAMPLE: ABNORMAL
SITE: ABNORMAL
SODIUM BLD-SCNC: 139 MMOL/L (ref 136–145)
SODIUM SERPL-SCNC: 139 MMOL/L (ref 136–145)
T4 FREE SERPL-MCNC: 0.93 NG/DL (ref 0.71–1.51)
TROPONIN I SERPL DL<=0.01 NG/ML-MCNC: 0.42 NG/ML (ref 0–0.03)
TSH SERPL DL<=0.005 MIU/L-ACNC: 14.47 UIU/ML (ref 0.4–4)
WBC # BLD AUTO: 5.6 K/UL (ref 3.9–12.7)

## 2023-10-10 PROCEDURE — 25000003 PHARM REV CODE 250: Performed by: HOSPITALIST

## 2023-10-10 PROCEDURE — A4216 STERILE WATER/SALINE, 10 ML: HCPCS | Performed by: HOSPITALIST

## 2023-10-10 PROCEDURE — 80053 COMPREHEN METABOLIC PANEL: CPT | Performed by: EMERGENCY MEDICINE

## 2023-10-10 PROCEDURE — 84484 ASSAY OF TROPONIN QUANT: CPT | Performed by: EMERGENCY MEDICINE

## 2023-10-10 PROCEDURE — 83735 ASSAY OF MAGNESIUM: CPT | Performed by: EMERGENCY MEDICINE

## 2023-10-10 PROCEDURE — 82330 ASSAY OF CALCIUM: CPT

## 2023-10-10 PROCEDURE — 99900035 HC TECH TIME PER 15 MIN (STAT)

## 2023-10-10 PROCEDURE — 82962 GLUCOSE BLOOD TEST: CPT

## 2023-10-10 PROCEDURE — 87040 BLOOD CULTURE FOR BACTERIA: CPT | Performed by: EMERGENCY MEDICINE

## 2023-10-10 PROCEDURE — 25000003 PHARM REV CODE 250: Performed by: EMERGENCY MEDICINE

## 2023-10-10 PROCEDURE — 83605 ASSAY OF LACTIC ACID: CPT | Performed by: EMERGENCY MEDICINE

## 2023-10-10 PROCEDURE — 96365 THER/PROPH/DIAG IV INF INIT: CPT

## 2023-10-10 PROCEDURE — 25500020 PHARM REV CODE 255: Performed by: HOSPITALIST

## 2023-10-10 PROCEDURE — 83880 ASSAY OF NATRIURETIC PEPTIDE: CPT | Performed by: EMERGENCY MEDICINE

## 2023-10-10 PROCEDURE — 27000207 HC ISOLATION

## 2023-10-10 PROCEDURE — 93005 ELECTROCARDIOGRAM TRACING: CPT

## 2023-10-10 PROCEDURE — 93010 ELECTROCARDIOGRAM REPORT: CPT | Mod: GW,,, | Performed by: INTERNAL MEDICINE

## 2023-10-10 PROCEDURE — 84295 ASSAY OF SERUM SODIUM: CPT

## 2023-10-10 PROCEDURE — 99285 EMERGENCY DEPT VISIT HI MDM: CPT | Mod: 25

## 2023-10-10 PROCEDURE — 93010 EKG 12-LEAD: ICD-10-PCS | Mod: GW,,, | Performed by: INTERNAL MEDICINE

## 2023-10-10 PROCEDURE — 63600175 PHARM REV CODE 636 W HCPCS: Performed by: EMERGENCY MEDICINE

## 2023-10-10 PROCEDURE — 83605 ASSAY OF LACTIC ACID: CPT | Mod: 91 | Performed by: HOSPITALIST

## 2023-10-10 PROCEDURE — 20000000 HC ICU ROOM

## 2023-10-10 PROCEDURE — 84439 ASSAY OF FREE THYROXINE: CPT | Performed by: EMERGENCY MEDICINE

## 2023-10-10 PROCEDURE — 84132 ASSAY OF SERUM POTASSIUM: CPT

## 2023-10-10 PROCEDURE — 85025 COMPLETE CBC W/AUTO DIFF WBC: CPT | Performed by: EMERGENCY MEDICINE

## 2023-10-10 PROCEDURE — 85014 HEMATOCRIT: CPT

## 2023-10-10 PROCEDURE — 99223 1ST HOSP IP/OBS HIGH 75: CPT | Mod: GV,,, | Performed by: PODIATRIST

## 2023-10-10 PROCEDURE — 82565 ASSAY OF CREATININE: CPT

## 2023-10-10 PROCEDURE — 84100 ASSAY OF PHOSPHORUS: CPT | Performed by: EMERGENCY MEDICINE

## 2023-10-10 PROCEDURE — 84443 ASSAY THYROID STIM HORMONE: CPT | Performed by: EMERGENCY MEDICINE

## 2023-10-10 RX ORDER — LANTHANUM CARBONATE 500 MG/1
1000 TABLET, CHEWABLE ORAL
Status: DISCONTINUED | OUTPATIENT
Start: 2023-10-10 | End: 2023-10-18 | Stop reason: HOSPADM

## 2023-10-10 RX ORDER — IBUPROFEN 200 MG
16 TABLET ORAL
Status: DISCONTINUED | OUTPATIENT
Start: 2023-10-10 | End: 2023-10-18 | Stop reason: HOSPADM

## 2023-10-10 RX ORDER — TALC
6 POWDER (GRAM) TOPICAL NIGHTLY PRN
Status: DISCONTINUED | OUTPATIENT
Start: 2023-10-10 | End: 2023-10-18 | Stop reason: HOSPADM

## 2023-10-10 RX ORDER — PROCHLORPERAZINE EDISYLATE 5 MG/ML
5 INJECTION INTRAMUSCULAR; INTRAVENOUS EVERY 6 HOURS PRN
Status: DISCONTINUED | OUTPATIENT
Start: 2023-10-10 | End: 2023-10-18 | Stop reason: HOSPADM

## 2023-10-10 RX ORDER — METOPROLOL SUCCINATE 50 MG/1
50 TABLET, EXTENDED RELEASE ORAL DAILY
Status: DISCONTINUED | OUTPATIENT
Start: 2023-10-11 | End: 2023-10-18 | Stop reason: HOSPADM

## 2023-10-10 RX ORDER — NALOXONE HCL 0.4 MG/ML
0.02 VIAL (ML) INJECTION
Status: DISCONTINUED | OUTPATIENT
Start: 2023-10-10 | End: 2023-10-18 | Stop reason: HOSPADM

## 2023-10-10 RX ORDER — GLUCAGON 1 MG
1 KIT INJECTION
Status: DISCONTINUED | OUTPATIENT
Start: 2023-10-10 | End: 2023-10-18 | Stop reason: HOSPADM

## 2023-10-10 RX ORDER — ONDANSETRON 2 MG/ML
4 INJECTION INTRAMUSCULAR; INTRAVENOUS EVERY 6 HOURS PRN
Status: DISCONTINUED | OUTPATIENT
Start: 2023-10-10 | End: 2023-10-18 | Stop reason: HOSPADM

## 2023-10-10 RX ORDER — LEVOTHYROXINE SODIUM 75 UG/1
75 TABLET ORAL
Status: DISCONTINUED | OUTPATIENT
Start: 2023-10-11 | End: 2023-10-18 | Stop reason: HOSPADM

## 2023-10-10 RX ORDER — SODIUM CHLORIDE 0.9 % (FLUSH) 0.9 %
10 SYRINGE (ML) INJECTION EVERY 8 HOURS
Status: DISCONTINUED | OUTPATIENT
Start: 2023-10-10 | End: 2023-10-18 | Stop reason: HOSPADM

## 2023-10-10 RX ORDER — ACETAMINOPHEN 325 MG/1
650 TABLET ORAL EVERY 4 HOURS PRN
Status: DISCONTINUED | OUTPATIENT
Start: 2023-10-10 | End: 2023-10-18 | Stop reason: HOSPADM

## 2023-10-10 RX ORDER — HYDROCODONE BITARTRATE AND ACETAMINOPHEN 5; 325 MG/1; MG/1
1 TABLET ORAL EVERY 6 HOURS PRN
Status: DISCONTINUED | OUTPATIENT
Start: 2023-10-10 | End: 2023-10-18 | Stop reason: HOSPADM

## 2023-10-10 RX ORDER — IBUPROFEN 200 MG
24 TABLET ORAL
Status: DISCONTINUED | OUTPATIENT
Start: 2023-10-10 | End: 2023-10-18 | Stop reason: HOSPADM

## 2023-10-10 RX ORDER — AMOXICILLIN 250 MG
1 CAPSULE ORAL 2 TIMES DAILY
Status: DISCONTINUED | OUTPATIENT
Start: 2023-10-10 | End: 2023-10-11

## 2023-10-10 RX ADMIN — PIPERACILLIN AND TAZOBACTAM 4.5 G: 4; .5 INJECTION, POWDER, LYOPHILIZED, FOR SOLUTION INTRAVENOUS; PARENTERAL at 01:10

## 2023-10-10 RX ADMIN — APIXABAN 2.5 MG: 2.5 TABLET, FILM COATED ORAL at 08:10

## 2023-10-10 RX ADMIN — IOHEXOL 75 ML: 350 INJECTION, SOLUTION INTRAVENOUS at 03:10

## 2023-10-10 RX ADMIN — Medication 10 ML: at 09:10

## 2023-10-10 RX ADMIN — SODIUM CHLORIDE 250 ML: 9 INJECTION, SOLUTION INTRAVENOUS at 01:10

## 2023-10-10 RX ADMIN — LANTHANUM CARBONATE 1000 MG: 500 TABLET, CHEWABLE ORAL at 06:10

## 2023-10-10 RX ADMIN — Medication 10 ML: at 02:10

## 2023-10-10 RX ADMIN — VANCOMYCIN HYDROCHLORIDE 750 MG: 750 INJECTION, POWDER, LYOPHILIZED, FOR SOLUTION INTRAVENOUS at 01:10

## 2023-10-10 NOTE — ASSESSMENT & PLAN NOTE
Lab Results   Component Value Date    PTH >2,500.0 (H) 02/17/2023    CALCIUM 8.0 (L) 10/10/2023    PHOS 3.9 10/10/2023     On lanthanum as outpatient- continue when resuming diet

## 2023-10-10 NOTE — ASSESSMENT & PLAN NOTE
BP is currently normal.  Home Rx= clonidine patch, entresto, BB  - hold these for now given concerns for sepsis, will resume in AM if appropriate

## 2023-10-10 NOTE — ASSESSMENT & PLAN NOTE
On TTS HD via THDC  - Nephrology consulted  - note: s/p AVG excision 9/28/23 for infection. Check blood cultures - pending  - wound care consult for LUE site

## 2023-10-10 NOTE — ASSESSMENT & PLAN NOTE
Patient has Paroxysmal (<7 days) atrial fibrillation which is controlled. Patient is currently in sinus rhythm.    Rate control: BB- holding for now  Anticoagulation: eliquis

## 2023-10-10 NOTE — ASSESSMENT & PLAN NOTE
Results for orders placed during the hospital encounter of 05/08/23    Echo Saline Bubble? No    Interpretation Summary  · The left ventricle is normal in size with mild eccentric hypertrophy and severely decreased systolic function.  · The estimated ejection fraction is 23%.  · There is severe left ventricular global hypokinesis.  · Grade II left ventricular diastolic dysfunction.  · Moderate left atrial enlargement.  · Normal right ventricular size with normal right ventricular systolic function.  · Moderate right atrial enlargement.  · There is mild aortic valve stenosis.  · Aortic valve area is 1.58 cm2; peak velocity is 2.01 m/s; mean gradient is 8 mmHg.  · Moderate mitral regurgitation.  · Moderate to severe tricuspid regurgitation.  · Mild pulmonic regurgitation.  · Elevated central venous pressure (15 mmHg).  · The estimated PA systolic pressure is 47 mmHg.  · There is mild-moderate pulmonary hypertension.  · Moderate posterolateral pericardial effusion. Trivial under the RA    - euvolemic to hypovolemic on exam  - will hold BB and entresto for now while monitoring BP in setting of suspected sepsis  BNP elevated   Recent Labs   Lab 10/10/23  1200   BNP >4,900*

## 2023-10-10 NOTE — H&P
[facility] Emergency Dept  [facility] Medicine  History & Physical    Patient Name: Eva Bloom  MRN: [id number]  Patient Class: IP- Inpatient  Admission Date: 10/10/2023  Attending Physician: Cora Costa MD   Primary Care Provider: Sowmya Mccain MD         Patient information was obtained from patient, past medical records and ER records.     Subjective:     Principal Problem:Severe sepsis    Chief Complaint:   Chief Complaint   Patient presents with    Weight Loss     Pt;s  reports pt is losing weight and sleeping a lot.  Decreased appetite since x6 days.  PT is a dialysis pt Tu-Th-Sat.          HPI: Ms Eva Bloom is a 58 y.o. woman with ESRD on HD TTS via [facility], systolic/diastolic CHF, PAD with toe wounds who presents for weight loss.  reports weight loss over last few weeks. Her appetite is poor but stable. No nausea, vomiting, dysphagia, abdominal pain. Reports diarrhea liquid stools 3-4x/day over last week. Does not make urine. She is s/p recent L AVG excision, site is healing well with no drainage or erythema. S/p L THDC placement, also working well with no issues. She does have L 3rd toe ulceration present, following with Podiatry. Was prescribed doxycycline after last appointment but never started it. Does have RCC following with Oncology on immunotherapy.     In ED, noted hypothermic and drowsy. Lactic elevated. Started warming blanket and broad antibiotics. Admitted to ICU.       Past Medical History:   Diagnosis Date    Anemia     Anticoagulant long-term use     Atrial fibrillation     Bronchitis 03/01/2017    Cancer 2016    kidney cancer    CHF (congestive heart failure), NYHA class II, chronic, systolic     CMV (cytomegalovirus) antibody positive     Encounter for blood transfusion     ESRD (end stage renal disease)     Essential hypertension 09/23/2015    H/O herpes simplex type 2 infection     Herpes simplex type 1 antibody positive     History of kidney  cancer     s/p left nephrectomy 1/2016    Hyperparathyroidism, unspecified     Hyperuricemia without signs of inflammatory arthritis and tophaceous disease     Kidney stones     LGSIL (low grade squamous intraepithelial dysplasia)     Myocardiopathy 07/21/2017    Prediabetes     Proteinuria     Renal disorder     Thyroid nodule     Urate nephropathy        Past Surgical History:   Procedure Laterality Date    BREAST CYST EXCISION      COLONOSCOPY N/A 11/12/2015    COLONOSCOPY N/A 3/12/2021    Procedure: COLONOSCOPY;  Surgeon: Brendon Lanier MD;  Location: [facility] ENDO;  Service: Endoscopy;  Laterality: N/A;  covid test 3/9, labs prior, prep instr mailed -ml    EXCISION OF ARTERIOVENOUS FISTULA Left 9/27/2023    Procedure: EXCISION, AV FISTULA;  Surgeon: FARIDEH Muñoz III, MD;  [facility];  Service: Vascular;  Laterality: Left;  LUE AV graft excision    INSERTION OF BIVENTRICULAR IMPLANTABLE CARDIOVERTER-DEFIBRILLATOR (ICD)  04/2021    INSERTION OF TUNNELED CENTRAL VENOUS CATHETER (CVC) WITH SUBCUTANEOUS PORT N/A 1/25/2023    Procedure: INSERTION, DUAL LUMEN CATHETER WITH PORT, WITH IMAGING GUIDANCE;  Surgeon: FARIDEH Muñoz III, MD;  [facility];  Service: Peripheral Vascular;  Laterality: N/A;  possinble permcath placment    NEPHRECTOMY-LAPAROSCOPIC Left 01/12/2016    PERCUTANEOUS TRANSLUMINAL ANGIOPLASTY OF ARTERIOVENOUS FISTULA Left 4/19/2023    Procedure: PTA, AV FISTULA;  Surgeon: FARIDEH Muñoz III, MD;  Location: [facility] CATH LAB;  Service: Peripheral Vascular;  Laterality: Left;    PERITONEAL CATHETER INSERTION      Permacath insertion  01/12/2017    PLACEMENT OF ARTERIOVENOUS GRAFT  12/28/2022    Procedure: INSERTION, GRAFT, ARTERIOVENOUS;  Surgeon: FARIDEH Muñoz III, MD;  [facility];  Service: Peripheral Vascular;;    REMOVAL, GRAFT, ARTERIOVENOUS, UPPER EXTREMITY Left 1/25/2023    Procedure: REMOVAL, GRAFT, ARTERIOVENOUS, UPPER  EXTREMITY;  Surgeon: FARIDEH Muñoz III, MD;  Location: [facility] OR 2ND FLR;  Service: Peripheral Vascular;  Laterality: Left;    REVISION OF ARTERIOVENOUS FISTULA Left 5/1/2023    Procedure: REVISION, AV FISTULA;  Surgeon: FARIDEH Muñoz III, MD;  Location: [facility] OR 2ND FLR;  Service: Peripheral Vascular;  Laterality: Left;  LUE AVG revision    REVISION OF PROCEDURE INVOLVING ARTERIOVENOUS GRAFT Left 12/28/2022    Procedure: REVISION, PROCEDURE INVOLVING ARTERIOVENOUS GRAFT;  Surgeon: FARIDEH Muñoz III, MD;  Location: [facility] OR 2ND FLR;  Service: Peripheral Vascular;  Laterality: Left;    REVISION OF PROCEDURE INVOLVING ARTERIOVENOUS GRAFT Left 7/21/2023    Procedure: LEFT ARM ARTERIOVENOUS GRAFT EXCISION  AND LIGATION;  Surgeon: Obi Werner MD;  Location: [facility] OR;  Service: Vascular;  Laterality: Left;  Left AVG excision and revision with interposition    SALPINGOOPHORECTOMY Right 2016    KJB---DAVINCI    TONSILLECTOMY      TUBAL LIGATION         Review of patient's allergies indicates:   Allergen Reactions    Coreg [carvedilol] Other (See Comments)     Nausea/vomiting    Allopurinol      Other reaction(s): abnormal transaminases       Current Facility-Administered Medications on File Prior to Encounter   Medication    0.9%  NaCl infusion     Current Outpatient Medications on File Prior to Encounter   Medication Sig    apixaban (ELIQUIS) 2.5 mg Tab Take 1 tablet (2.5 mg total) by mouth 2 (two) times daily.    aspirin 81 MG Chew Take 1 tablet (81 mg total) by mouth once daily.    cabozantinib (CABOMETYX) 60 mg Tab TAKE ONE TABLET BY MOUTH ONCE DAILY AT THE SAME TIME ON AN EMPTY STOMACH AT LEAST 1 HOUR BEFORE OR 2 HOURS AFTER EATING. AVOID GRAPEFRUIT PRODUCTS    cloNIDine 0.3 mg/24 hr td ptwk (CATAPRES) 0.3 mg/24 hr Place 1 patch onto the skin every 7 days.    cyclobenzaprine (FLEXERIL) 5 MG tablet TAKE 1 TABLET BY MOUTH DAILY AS NEEDED FOR MUSCLE SPASMS.    ENTRESTO 49-51 mg per tablet  TAKE 1 TABLET BY MOUTH TWICE A DAY    fluticasone propionate (FLONASE) 50 mcg/actuation nasal spray 1 spray (50 mcg total) by Each Nostril route 2 (two) times daily as needed (ear fullness).    HYDROcodone-acetaminophen (NORCO) 5-325 mg per tablet Take 1 tablet by mouth every 6 (six) hours as needed for Pain.    levothyroxine (SYNTHROID) 75 MCG tablet Take 1 tablet (75 mcg total) by mouth once daily.    metoprolol succinate (TOPROL-XL) 100 MG 24 hr tablet Take 1/2 tablet (50mg) once daily    acetaminophen (TYLENOL) 500 MG tablet Take 500 mg by mouth every 6 (six) hours as needed for Pain.    doxycycline monohydrate 100 mg Tab Take 1 tablet (100 mg total) by mouth 2 (two) times daily. (Patient not taking: Reported on 10/3/2023)    epoetin david-epbx (RETACRIT INJ) Epoetin david - epbx (Retacrit)    hydrALAZINE (APRESOLINE) 100 MG tablet Take 1 tablet (100 mg total) by mouth every 12 (twelve) hours.    lanthanum (FOSRENOL) 1000 MG chewable tablet Take 1 tablet by mouth.    lidocaine-prilocaine (EMLA) cream APPLY ATLEAST 30 MINUTES BEFORE TREATMENT 3 TIMES A WEEK    mv,Ca,min-folic acid-vit K1 (ONE-A-DAY WOMEN'S 50 PLUS) 400-20 mcg Tab Take 1 tablet by mouth once daily.    naloxone (NARCAN) 1 mg/mL injection 2 mg (1 mg per nostril) by Nasal route as needed for opioid overdose; may repeat in 3 to 5 minutes if not effective. Call 911 (Patient not taking: Reported on 10/3/2023)    sacubitriL-valsartan (ENTRESTO) 24-26 mg per tablet Take 1 tablet by mouth 2 (two) times daily. (Patient not taking: Reported on 10/3/2023)    [DISCONTINUED] amLODIPine (NORVASC) 5 MG tablet TAKE 1 TABLET BY MOUTH EVERY DAY     Family History       Problem Relation (Age of Onset)    Cataracts Maternal Aunt    Diabetes Father, Maternal Grandfather    Heart disease Sister    Hypertension Mother    Kidney disease Father    No Known Problems Sister, Sister, Brother, Brother, Maternal Uncle, Paternal Aunt, Paternal Uncle, Maternal  Grandmother, Paternal Grandmother, Paternal Grandfather, Son, Son, Other          Tobacco Use    Smoking status: Never     Passive exposure: Never    Smokeless tobacco: Never   Substance and Sexual Activity    Alcohol use: No    Drug use: Yes     Types: Hydrocodone    Sexual activity: Not Currently     Review of Systems   Constitutional:  Positive for fatigue. Negative for appetite change, chills and fever.   HENT:  Negative for sinus pressure, sinus pain, sore throat and trouble swallowing.    Respiratory:  Negative for cough, chest tightness and shortness of breath.    Cardiovascular:  Negative for chest pain, palpitations and leg swelling.   Gastrointestinal:  Positive for diarrhea. Negative for abdominal distention, abdominal pain, constipation, nausea and vomiting.   Musculoskeletal:  Negative for arthralgias and myalgias.   Skin:  Positive for wound.   Neurological:  Negative for numbness and headaches.   Psychiatric/Behavioral:  Negative for confusion.      Objective:     Vital Signs (Most Recent):  Temp: (!) 95.5 °F (35.3 °C) (10/10/23 1547)  Pulse: 77 (10/10/23 1547)  Resp: 15 (10/10/23 1547)  BP: 120/80 (10/10/23 1546)  SpO2: 100 % (10/10/23 1547) Vital Signs (24h Range):  Temp:  [95.2 °F (35.1 °C)-95.5 °F (35.3 °C)] 95.5 °F (35.3 °C)  Pulse:  [71-87] 77  Resp:  [15-22] 15  SpO2:  [100 %] 100 %  BP: (120-135)/(77-89) 120/80     Weight: 38 kg (83 lb 12.4 oz)  Body mass index is 14.38 kg/m².     Physical Exam  Vitals and nursing note reviewed.   Constitutional:       General: She is not in acute distress.     Appearance: She is not toxic-appearing.      Comments: drowsy   HENT:      Head:      Comments: Temporal wasting     Nose: Nose normal.      Mouth/Throat:      Mouth: Mucous membranes are moist.   Eyes:      General: No scleral icterus.     Conjunctiva/sclera: Conjunctivae normal.   Cardiovascular:      Rate and Rhythm: Normal rate and regular rhythm.      Pulses: Normal pulses.      Heart sounds:  Normal heart sounds.      Comments: L ICD   Pulmonary:      Effort: Pulmonary effort is normal.      Breath sounds: Normal breath sounds.      Comments: Room air  Abdominal:      General: Bowel sounds are normal. There is no distension.      Palpations: Abdomen is soft. There is no mass.      Tenderness: There is no abdominal tenderness. There is no guarding.   Musculoskeletal:      Right lower leg: No edema.      Left lower leg: No edema.   Skin:     General: Skin is warm and dry.      Comments: LUE bandage in place, no drainage or erythema. L THDC in place, no erythema. L 3rd toe wound, no surrounding erythema   Neurological:      Mental Status: She is oriented to person, place, and time.                  Significant Labs: All pertinent labs within the past 24 hours have been reviewed.    Significant Imaging: I have reviewed all pertinent imaging results/findings within the past 24 hours.    Assessment/Plan:     * Severe sepsis  This patient does have evidence of infective focus. My overall impression is severe sepsis.  Source: Skin and Soft Tissue (location feet) and Unknown vs bacteremia   Antibiotics given-   Antibiotics (72h ago, onward)    Start     Stop Route Frequency Ordered    10/11/23 0100  piperacillin-tazobactam (ZOSYN) 4.5 g in dextrose 5 % in water (D5W) 100 mL IVPB (MB+)         -- IV Every 12 hours (non-standard times) 10/10/23 1556    10/10/23 1345  vancomycin - pharmacy to dose  (vancomycin IVPB (PEDS and ADULTS))        See Hyperspace for full Linked Orders Report.    -- IV pharmacy to manage frequency 10/10/23 1245        Latest lactate reviewed-  Recent Labs   Lab 10/10/23  1232   LACTATE 5.3*     Organ dysfunction indicated by Encephalopathy    Fluid challenge Contraindicated- Fluid bolus is contraindicated in this patient due to End Stage Renal Disease- given 500cc bolus   Post- resuscitation assessment Yes Perfusion exam was performed within 6 hours of septic shock presentation after bolus  shows Adequate tissue perfusion assessed by non-invasive monitoring     - check blood cultures- pending  - does not make urine  - Podiatry to evaluate foot wounds  - CT chest/abd/pelvis pending   - repeat lactic downtrending  - warming blanket for hypothermia    Weight loss  Wt Readings from Last 3 Encounters:   10/10/23 1057 38 kg (83 lb 12.4 oz)   10/02/23 0726 40.8 kg (90 lb)   09/27/23 1228 40.8 kg (90 lb)     Body mass index is 14.38 kg/m².    - etiology unclear- potential sepsis vs worsening malignancy vs other  - nutrition consult      Body mass index (BMI) less than 19  See weight loss      Chronic combined systolic and diastolic heart failure  Results for orders placed during the hospital encounter of 05/08/23    Echo Saline Bubble? No    Interpretation Summary  · The left ventricle is normal in size with mild eccentric hypertrophy and severely decreased systolic function.  · The estimated ejection fraction is 23%.  · There is severe left ventricular global hypokinesis.  · Grade II left ventricular diastolic dysfunction.  · Moderate left atrial enlargement.  · Normal right ventricular size with normal right ventricular systolic function.  · Moderate right atrial enlargement.  · There is mild aortic valve stenosis.  · Aortic valve area is 1.58 cm2; peak velocity is 2.01 m/s; mean gradient is 8 mmHg.  · Moderate mitral regurgitation.  · Moderate to severe tricuspid regurgitation.  · Mild pulmonic regurgitation.  · Elevated central venous pressure (15 mmHg).  · The estimated PA systolic pressure is 47 mmHg.  · There is mild-moderate pulmonary hypertension.  · Moderate posterolateral pericardial effusion. Trivial under the RA    - euvolemic to hypovolemic on exam  - will hold BB and entresto for now while monitoring BP in setting of suspected sepsis  BNP elevated   Recent Labs   Lab 10/10/23  1200   BNP >4,900*         Metastatic renal cell carcinoma to lung, unspecified laterality  Follows with Oncology, last  seen 9/2023  On cabozantinib at home   - repeat CT to look for any progression in setting of worsening weight loss -pending  - side effects also include weight loss  - may need to discuss with Oncology if signs of progression  - hold home Rx for now       Hypothyroidism  Lab Results   Component Value Date    TSH 14.474 (H) 10/10/2023   FT4 normal  TSH is improved from prior   - continue levothyroxine  - needs TSH rechecked outside of acute illness setting         PAF (paroxysmal atrial fibrillation)  Patient has Paroxysmal (<7 days) atrial fibrillation which is controlled. Patient is currently in sinus rhythm.    Rate control: BB- holding for now  Anticoagulation: eliquis    ICD (implantable cardioverter-defibrillator) in place - SubQ  Noted. No acute issues       Anemia in ESRD (end-stage renal disease)  Macrocytic anemia present on admit. No signs of bleeding.   - EPO with HD  - B12, folate normal when last checked. TSAT appropriate  - CBC in AM to monitor       ESRD (end stage renal disease) on dialysis  On TTS HD via THDC  - Nephrology consulted  - note: s/p AVG excision 9/28/23 for infection. Check blood cultures - pending  - wound care consult for LUE site      Essential hypertension  BP is currently normal.  Home Rx= clonidine patch, entresto, BB  - hold these for now given concerns for sepsis, will resume in AM if appropriate      Hyperparathyroidism, secondary renal  Lab Results   Component Value Date    PTH >2,500.0 (H) 02/17/2023    CALCIUM 8.0 (L) 10/10/2023    PHOS 3.9 10/10/2023     On lanthanum as outpatient- continue when resuming diet         VTE Risk Mitigation (From admission, onward)         Ordered     Place PING hose  Until discontinued         10/10/23 1422     Place sequential compression device  Until discontinued         10/10/23 1422     Reason for No Pharmacological VTE Prophylaxis  Once        Question:  Reasons:  Answer:  Already adequately anticoagulated on oral Anticoagulants     10/10/23 1422     IP VTE HIGH RISK PATIENT  Once         10/10/23 1422                 Critical care time spent on the evaluation and treatment of severe organ dysfunction, review of pertinent labs and imaging studies, discussions with consulting providers and discussions with patient/family: 60 minutes          Cora Costa MD  Department of Hospital Medicine  [facility] [place] - Emergency Dept

## 2023-10-10 NOTE — ED PROVIDER NOTES
Encounter Date: 10/10/2023       History     Chief Complaint   Patient presents with    Weight Loss     Pt;s  reports pt is losing weight and sleeping a lot.  Decreased appetite since x6 days.  PT is a dialysis pt Tu-Th-Sat.       11:07 am, pt not in ED bed. Hudson Ramos MD, 10/10/2023 11:08 AM    To triage to locate pt. Pt in restroom with spouse per staff. Knocked on door,  states they are ok, coming out soon, don't need help. Wheelchair and tech outside restroom door to help pt to ED bed.  Hudson Ramos MD, 10/10/2023 11:11 AM    Patient escorted to ED bed 24 by wheelchair by technician, and I spoke with  extensively.  Examined patient fully.   reports weight loss over the last week, significant.  Reports she used to weigh about 95 lb.  Reports she currently weighs 81 lb.  He states she barely eats anything.  She reports pain to the right thigh.  She reports pain is around the area of abrasions over the right thigh.  She denies chest pain, shortness of breath, back pain or other pain.  She denies feeling lightheaded.  She denies syncope.  She had full dialysis today.  She goes to Tuesday, Thursday, Saturday dialysis.   reports she had a wound infection in July to the left great toe that was because of a blood clot in her leg and she had subsequent stent placed.  Also reports she had an infection of the left dialysis graft, left upper arm.  The surgeon cut out some fungus reportedly.  She had a wound VAC placed last week that was removed 2 days later.  He denies any documented fevers at home.              Review of patient's allergies indicates:   Allergen Reactions    Coreg [carvedilol] Other (See Comments)     Nausea/vomiting    Allopurinol      Other reaction(s): abnormal transaminases     Past Medical History:   Diagnosis Date    Anemia     Anticoagulant long-term use     Atrial fibrillation     Bronchitis 03/01/2017    Cancer 2016    kidney cancer    CHF (congestive heart  failure), NYHA class II, chronic, systolic     CMV (cytomegalovirus) antibody positive     Encounter for blood transfusion     ESRD (end stage renal disease)     Essential hypertension 09/23/2015    H/O herpes simplex type 2 infection     Herpes simplex type 1 antibody positive     History of kidney cancer     s/p left nephrectomy 1/2016    Hyperparathyroidism, unspecified     Hyperuricemia without signs of inflammatory arthritis and tophaceous disease     Kidney stones     LGSIL (low grade squamous intraepithelial dysplasia)     Myocardiopathy 07/21/2017    Prediabetes     Proteinuria     Renal disorder     Thyroid nodule     Urate nephropathy      Past Surgical History:   Procedure Laterality Date    BREAST CYST EXCISION      COLONOSCOPY N/A 11/12/2015    COLONOSCOPY N/A 3/12/2021    Procedure: COLONOSCOPY;  Surgeon: Brendon Lanier MD;  Location: [facility] ENDO;  Service: Endoscopy;  Laterality: N/A;  covid test 3/9, labs prior, prep instr mailed -ml    EXCISION OF ARTERIOVENOUS FISTULA Left 9/27/2023    Procedure: EXCISION, AV FISTULA;  Surgeon: FARIDEH Muñoz III, MD;  Location: [facility] OR [address] [place] [address];  Service: Vascular;  Laterality: Left;  LUE AV graft excision    INSERTION OF BIVENTRICULAR IMPLANTABLE CARDIOVERTER-DEFIBRILLATOR (ICD)  04/2021    INSERTION OF TUNNELED CENTRAL VENOUS CATHETER (CVC) WITH SUBCUTANEOUS PORT N/A 1/25/2023    Procedure: INSERTION, DUAL LUMEN CATHETER WITH PORT, WITH IMAGING GUIDANCE;  Surgeon: FARIDEH Muñoz III, MD;  [facility];  Service: Peripheral Vascular;  Laterality: N/A;  possinble permcath placment    NEPHRECTOMY-LAPAROSCOPIC Left 01/12/2016    PERCUTANEOUS TRANSLUMINAL ANGIOPLASTY OF ARTERIOVENOUS FISTULA Left 4/19/2023    Procedure: PTA, AV FISTULA;  Surgeon: FARIDEH Muñoz III, MD;  Location: [facility] CATH LAB;  Service: Peripheral Vascular;  Laterality: Left;    PERITONEAL CATHETER INSERTION      Permacath insertion  01/12/2017    PLACEMENT OF ARTERIOVENOUS GRAFT   12/28/2022    Procedure: INSERTION, GRAFT, ARTERIOVENOUS;  Surgeon: FARIDEH Muñoz III, MD;  Location: [facility] OR [facility] FLR;  Service: Peripheral Vascular;;    REMOVAL, GRAFT, ARTERIOVENOUS, UPPER EXTREMITY Left 1/25/2023    Procedure: REMOVAL, GRAFT, ARTERIOVENOUS, UPPER EXTREMITY;  Surgeon: FARIDEH Muñoz III, MD;  [facility];  Service: Peripheral Vascular;  Laterality: Left;    REVISION OF ARTERIOVENOUS FISTULA Left 5/1/2023    Procedure: REVISION, AV FISTULA;  Surgeon: FARIDEH Muñoz III, MD;  [facility];  Service: Peripheral Vascular;  Laterality: Left;  LUE AVG revision    REVISION OF PROCEDURE INVOLVING ARTERIOVENOUS GRAFT Left 12/28/2022    Procedure: REVISION, PROCEDURE INVOLVING ARTERIOVENOUS GRAFT;  Surgeon: FARIDEH Muñoz III, MD;  [facility];  Service: Peripheral Vascular;  Laterality: Left;    REVISION OF PROCEDURE INVOLVING ARTERIOVENOUS GRAFT Left 7/21/2023    Procedure: LEFT ARM ARTERIOVENOUS GRAFT EXCISION  AND LIGATION;  Surgeon: Obi Werner MD;  Location: [facility];  Service: Vascular;  Laterality: Left;  Left AVG excision and revision with interposition    SALPINGOOPHORECTOMY Right 2016    KJB---DAVINCI    TONSILLECTOMY      TUBAL LIGATION       Family History   Problem Relation Age of Onset    Hypertension Mother     Diabetes Father     Kidney disease Father     No Known Problems Sister     Heart disease Sister     No Known Problems Sister     No Known Problems Brother     No Known Problems Brother     Cataracts Maternal Aunt     No Known Problems Maternal Uncle     No Known Problems Paternal Aunt     No Known Problems Paternal Uncle     No Known Problems Maternal Grandmother     Diabetes Maternal Grandfather     No Known Problems Paternal Grandmother     No Known Problems Paternal Grandfather     No Known Problems Son     No Known Problems Son     No Known Problems Other     Breast cancer Neg Hx     Colon cancer Neg Hx     Cancer Neg Hx      Stroke Neg Hx     Amblyopia Neg Hx     Blindness Neg Hx     Glaucoma Neg Hx     Macular degeneration Neg Hx     Retinal detachment Neg Hx     Strabismus Neg Hx     Thyroid disease Neg Hx      Social History     Tobacco Use    Smoking status: Never     Passive exposure: Never    Smokeless tobacco: Never   Substance Use Topics    Alcohol use: No    Drug use: Yes     Types: Hydrocodone     Review of Systems   Constitutional:  Positive for appetite change and unexpected weight change.   Respiratory:  Negative for shortness of breath.    Cardiovascular:  Negative for chest pain.   Gastrointestinal:  Negative for diarrhea and vomiting.   Neurological:         Positive generalized weakness, no focal weakness.    All other systems reviewed and are negative.      Physical Exam     Initial Vitals   BP Pulse Resp Temp SpO2   10/10/23 1057 10/10/23 1057 10/10/23 1057 10/10/23 1230 10/10/23 1057   130/89 87 18 (!) 95.2 °F (35.1 °C) 100 %      MAP       --                Physical Exam    Nursing note and vitals reviewed.  Constitutional:   Frail. Cachectic. No acute distress but appears ill. Answers questions slowly but correctly.    HENT:   Head: Normocephalic and atraumatic.   Eyes: Conjunctivae and EOM are normal.   Neck: Neck supple.   Normal range of motion.  Cardiovascular:  Normal rate, regular rhythm and normal heart sounds.           Catheter protruding from L anterior chest wall with dry dressing. Dry dressing over L upper arm access with flow felt by hand over dressing. Good perfusion of LUE   Pulmonary/Chest: Breath sounds normal. No respiratory distress. She has no wheezes. She has no rales.   Abdominal: Abdomen is soft. She exhibits no distension. There is no abdominal tenderness. There is no rebound.   Musculoskeletal:      Cervical back: Normal range of motion and neck supple.      Comments: Thin. Wasting. Trace edema to right leg compared with left. See skin.      Neurological: She is alert and oriented to  person, place, and time. GCS score is 15. GCS eye subscore is 4. GCS verbal subscore is 5. GCS motor subscore is 6.   Skin: Skin is warm. Capillary refill takes less than 2 seconds. No rash noted. There is pallor.   Small  ulceration of L 4th toe, trace drying blood, no foul smell. Trace surrounding erythema. Dime and nickel sized lesions to R leg over knee and thigh with abrasion over R lateral thigh.    Psychiatric: She has a normal mood and affect. Thought content normal.         ED Course   Critical Care    Date/Time: 10/11/2023 12:02 PM    Performed by: Hudson Ramos MD  Authorized by: Cora Costa MD  Direct patient critical care time: 10 minutes  Additional history critical care time: 5 minutes  Ordering / reviewing critical care time: 5 minutes  Documentation critical care time: 5 minutes  Consulting other physicians critical care time: 5 minutes  Total critical care time (exclusive of procedural time) : 30 minutes        Labs Reviewed   CBC W/ AUTO DIFFERENTIAL - Abnormal; Notable for the following components:       Result Value    RBC 3.21 (*)     Hemoglobin 10.7 (*)     Hematocrit 36.0 (*)      (*)     MCH 33.3 (*)     MCHC 29.7 (*)     RDW 22.4 (*)     Mono # 0.2 (*)     nRBC 1 (*)     Mono % 3.6 (*)     All other components within normal limits   TSH - Abnormal; Notable for the following components:    TSH 14.474 (*)     All other components within normal limits   LACTIC ACID, PLASMA - Abnormal; Notable for the following components:    Lactate (Lactic Acid) 5.3 (*)     All other components within normal limits    Narrative:        Lactic Acid critical result(s) called and verbal readback obtained   from [facility] by JCT3 10/10/2023 13:21   TROPONIN I - Abnormal; Notable for the following components:    Troponin I 0.421 (*)     All other components within normal limits   B-TYPE NATRIURETIC PEPTIDE - Abnormal; Notable for the following components:    BNP >4,900 (*)     All other  components within normal limits   COMPREHENSIVE METABOLIC PANEL - Abnormal; Notable for the following components:    CO2 18 (*)     Creatinine 3.0 (*)     Calcium 8.0 (*)     Total Protein 5.7 (*)     Albumin 2.4 (*)     Alkaline Phosphatase 403 (*)      (*)     eGFR 17 (*)     Anion Gap 17 (*)     All other components within normal limits   LACTIC ACID, PLASMA - Abnormal; Notable for the following components:    Lactate (Lactic Acid) 4.5 (*)     All other components within normal limits    Narrative:     Lactic acid critical result(s) called and verbal readback obtained   from Sonja Whitten by ZION 10/10/2023 16:39   ISTAT PROCEDURE - Abnormal; Notable for the following components:    POC Creatinine 3.0 (*)     POC Potassium 3.3 (*)     POC Anion Gap 18 (*)     POC Ionized Calcium 1.00 (*)     POC Hematocrit 32 (*)     All other components within normal limits   MAGNESIUM   PHOSPHORUS   T4, FREE   POCT GLUCOSE     EKG Readings: (Independently Interpreted)   Initial Reading: No STEMI. Previous EKG: Compared with most recent EKG Rhythm: Normal Sinus Rhythm. Ectopy: No Ectopy. Other Findings: Prolonged QT Interval.       Imaging Results              CT Chest Abdomen Pelvis With Contrast (xpd) (Final result)  Result time 10/10/23 16:47:13      Final result by Yaneth Ray MD (10/10/23 16:47:13)                   Impression:      No finding to correlate with the clinical history.    Two new small pulmonary nodules, nonspecific, can be followed on subsequent exams.    Minimal area of ground-glass opacity in the right lower lobe with a small parenchymal opacity in the left upper lobe, new compared to prior imaging, nonspecific, may relate to edema, developing infection, aspiration other etiology.    Possible small hypodensity in the pancreas    Additional findings as above      Electronically signed by: Yaneth Ray MD  Date:    10/10/2023  Time:    16:47               Narrative:    EXAMINATION:  CT  CHEST ABDOMEN PELVIS WITH CONTRAST (XPD)    CLINICAL HISTORY:  Sepsis;    TECHNIQUE:  Low dose axial images, sagittal and coronal reformations were obtained from the thoracic inlet to the pubic symphysis following the IV administration of 75 mL of Omnipaque 350 and no oral contrast.  Note that the timing of the abdominal portion of the examination is early, basically arterial phase imaging.    COMPARISON:  July 2023    FINDINGS:  Chest:    Evaluation of the lungs is limited due to motion. Some artifact created due to pacing device along the left chest wall.    Cluster of tree-in-bud nodules within the anterior right upper lobe similar to prior imaging.  A few nodular foci within the lungs are stable.  There are new small nodules, series 2, image 67, image 77.    Mild ground-glass opacity at the right lung base new.  Minimal parenchymal opacity in the left upper lobe new.    Incompletely imaged dialysis access at the left axilla.  Adjacent calcification is felt to relate to vascular structure.  There is a central venous catheter entering via the left subclavian, with tip at the right atrium.  There is no significant mediastinal or hilar lymphadenopathy.    The heart is enlarged.  There is no significant pleural effusion.  There is small pericardial effusion.  Calcification present along the wall of the aorta with mild ectasia of the ascending aorta.    Abdomen/pelvis:    Lines external to the patient create some artifact.  Timing of the contrast bolus also decreases sensitivity.  There is reflux of contrast into the inferior vena cava and hepatic veins likely relating to right heart dysfunction.    Evaluation of the liver parenchyma limited, with no definite abnormality seen.  Hypodensity seen on previous exam is not well visualized today.  The spleen is not enlarged.    The stomach is unremarkable.  There is cholelithiasis.  No evidence of biliary ductal dilatation.    There is no evidence of adrenal mass.   Questionable small hypodensity in the expected region of the pancreatic tail, evaluation of this region somewhat limited.    The right kidney demonstrates small size.  Multiple hypodense structures within the right kidney difficult to characterize well but appear grossly similar to prior imaging.    Left kidney has been removed.  Soft tissue density previously described in the nephrectomy bed is overall similar but difficult to distinguish from adjacent pancreatic tail.    There is advanced calcification along the wall of the aorta and branches.  No aortic aneurysm.  No significant periaortic lymphadenopathy.  There is calcification along the wall of inferior vena cava and branches.    Bladder not well distended or well evaluated.  Uterus is likely present.  The evaluation of the bowel is somewhat limited, with no evidence of bowel distension.                                       X-Ray Chest AP Portable (Final result)  Result time 10/10/23 13:53:44      Final result by Yaneth Ray MD (10/10/23 13:53:44)                   Impression:      As above      Electronically signed by: Yaneth Ray MD  Date:    10/10/2023  Time:    13:53               Narrative:    EXAMINATION:  XR CHEST AP PORTABLE    CLINICAL HISTORY:  End stage renal disease    TECHNIQUE:  Single frontal view of the chest was performed.    COMPARISON:  August 25, 2023    FINDINGS:  The heart size is markedly enlarged but similar to previous study.  There are surgical changes within the soft tissues of the left arm.  There is a central venous catheter entering the left subclavian with tip in right atrium.  Pacer device present over the left chest.  Vascular calcification present along the wall of the aorta.    There is osseous demineralization.  There is no evidence of significant pleural fluid on this single view.  No evidence of focal consolidation.  Prominence of pulmonary vasculature similar to prior exam.                                        X-Ray Humerus 2 View Left (Final result)  Result time 10/10/23 13:51:09      Final result by Cameron Pierce MD (10/10/23 13:51:09)                   Impression:      1. No convincing acute displaced fracture or dislocation of the left humerus, please see above for additional findings.      Electronically signed by: Cameron Pierce MD  Date:    10/10/2023  Time:    13:51               Narrative:    EXAMINATION:  XR HUMERUS 2 VIEW LEFT    CLINICAL HISTORY:  End stage renal disease    COMPARISON:  None    FINDINGS:  Two views left humerus.    There is osteopenia.  No acute displaced fracture or dislocation of the humerus.  Vascular stent projects over the axilla.  Surgical changes are noted of the soft tissues of the upper arm.  No acute displaced rib fracture.  There is coarse interstitial attenuation involving the visualized left lung zones.  Pacing device noted.  There is marked vascular calcification noting suspected upper extremity dialysis fistula.  There is cachectic change of the body wall.                                       X-Ray Foot Complete Left (Final result)  Result time 10/10/23 13:49:41      Final result by Cameron Pierce MD (10/10/23 13:49:41)                   Impression:      1. No acute displaced fracture or dislocation of the foot.      Electronically signed by: Cameron Pierce MD  Date:    10/10/2023  Time:    13:49               Narrative:    EXAMINATION:  XR FOOT COMPLETE 3 VIEW LEFT    CLINICAL HISTORY:  .  Unspecified open wound of unspecified toe(s) without damage to nail, initial encounter    TECHNIQUE:  AP, lateral and oblique views of the left foot were performed.    COMPARISON:  07/26/2023    FINDINGS:  Three views left foot.    There is diffuse osteopenia.  There is marked vascular calcification.  There are degenerative changes of the foot.  No convincing acute displaced fracture or dislocation of the foot.  No radiopaque foreign body.  No significant edema.                                        Medications   vancomycin - pharmacy to dose (has no administration in time range)   levothyroxine tablet 75 mcg (75 mcg Oral Given 10/11/23 0559)   sodium chloride 0.9% flush 10 mL (10 mLs Intravenous Given 10/11/23 0600)   melatonin tablet 6 mg (has no administration in time range)   ondansetron injection 4 mg (has no administration in time range)   prochlorperazine injection Soln 5 mg (has no administration in time range)   senna-docusate 8.6-50 mg per tablet 1 tablet (1 tablet Oral Not Given 10/11/23 0900)   acetaminophen tablet 650 mg (650 mg Oral Given 10/11/23 0221)   naloxone 0.4 mg/mL injection 0.02 mg (has no administration in time range)   glucose chewable tablet 16 g (has no administration in time range)   glucose chewable tablet 24 g (has no administration in time range)   glucagon (human recombinant) injection 1 mg (has no administration in time range)   dextrose 10% bolus 125 mL 125 mL (has no administration in time range)   dextrose 10% bolus 250 mL 250 mL (has no administration in time range)   piperacillin-tazobactam (ZOSYN) 4.5 g in dextrose 5 % in water (D5W) 100 mL IVPB (MB+) (0 g Intravenous Stopped 10/11/23 0527)   apixaban tablet 2.5 mg (2.5 mg Oral Given 10/11/23 0813)   sacubitriL-valsartan 24-26 mg per tablet 2 tablet (2 tablets Oral Given 10/11/23 0813)   lanthanum chewable tablet 1,000 mg (1,000 mg Oral Not Given 10/11/23 1200)   metoprolol succinate (TOPROL-XL) 24 hr tablet 50 mg (50 mg Oral Given 10/11/23 0813)   HYDROcodone-acetaminophen 5-325 mg per tablet 1 tablet (has no administration in time range)   vitamin renal formula (B-complex-vitamin c-folic acid) 1 mg per capsule 1 capsule (1 capsule Oral Given 10/11/23 0813)   sodium chloride 0.9% bolus 250 mL 250 mL (0 mLs Intravenous Stopped 10/10/23 1350)   piperacillin-tazobactam (ZOSYN) 4.5 g in dextrose 5 % in water (D5W) 100 mL IVPB (MB+) (0 g Intravenous Stopped 10/10/23 5091)   vancomycin 750 mg in  dextrose 5 % (D5W) 250 mL IVPB (Vial-Mate) (0 mg Intravenous Stopped 10/10/23 1523)   iohexoL (OMNIPAQUE 350) injection 75 mL (75 mLs Intravenous Given 10/10/23 1500)     Medical Decision Making  Pt noted to be hypothermic, warming hugger applied.  I clinically suspect sepsis at this time.  Patient reported she wanted something to eat, was given a snack, has not eaten any of it.  CBC, troponin and BNP have resulted.  CMP has not resulted, reported from lab that is specimen hemolyzed, needed recollect.  Recollection has been ordered.  I have also ordered i-STAT Chem 8 an effort to get some results more quickly. I have updated  and spoken with sister in law, in medical field. I have ordered Zosyn and vancomycin, as well as 250 mg bolus for suspected sepsis, out of an abundance of safety, as I clinically suspect patient is quite ill. Lactate elevated >5. Pt will be going to ICU. Hudson Ramos MD, 10/10/2023 1:37 PM    Prior records reviewed. Pt seen by podiatry on 10/2 and started on doxycycline. Will consult. Discussed plan for admission w Dr. Costa and  will assume care of pt upon admission. Pt has been acutely stable during her time in the ER and I have checked on her several times.       Amount and/or Complexity of Data Reviewed  Labs: ordered.  Radiology: ordered.    Risk  Decision regarding hospitalization.                                         Clinical Impression:   Final diagnoses:  [R63.4] Weight loss  [N18.6] ESRD (end stage renal disease)  [S91.109A] Open toe wound, initial encounter  [A41.9] Sepsis  [A41.9, R65.20] Severe sepsis  [T68.XXXA] Hypothermia, initial encounter (Primary)        ED Disposition Condition    Admit Serious                Hudson Ramos MD  10/11/23 1202

## 2023-10-10 NOTE — ASSESSMENT & PLAN NOTE
Follows with Oncology, last seen 9/2023  On cabozantinib at home   - repeat CT to look for any progression in setting of worsening weight loss -pending  - side effects also include weight loss  - may need to discuss with Oncology if signs of progression  - hold home Rx for now

## 2023-10-10 NOTE — CONSULTS
[facility] - Emergency Dept  Podiatry  Consult Note    Patient Name: Eva Bloom  MRN: [id number]  Admission Date: 10/10/2023  Hospital Length of Stay: 0 days  Attending Physician: Cora Costa MD  Primary Care Provider: Sowmya Mccain MD     Inpatient consult to Podiatry  Consult performed by: Ginger Mandel DPM  Consult ordered by: Hudson Ramos MD  Reason for consult: Left toe infection        Subjective:     History of Present Illness: Eva Bloom is a 58 y.o. female with  has a past medical history of Anemia, Anticoagulant long-term use, Atrial fibrillation, Bronchitis, Cancer, CHF (congestive heart failure), NYHA class II, chronic, systolic, CMV (cytomegalovirus) antibody positive, Encounter for blood transfusion, ESRD (end stage renal disease), Essential hypertension, H/O herpes simplex type 2 infection, Herpes simplex type 1 antibody positive, History of kidney cancer, Hyperparathyroidism, unspecified, Hyperuricemia without signs of inflammatory arthritis and tophaceous disease, Kidney stones, LGSIL (low grade squamous intraepithelial dysplasia), Myocardiopathy, Prediabetes, Proteinuria, Renal disorder, Thyroid nodule, and Urate nephropathy.  Consult to Podiatry for evaluation treatment of left foot infection.  Location:  3rd digit Onset of the symptoms was late June/early July 2023 when she sustained direct trauma secondary a chair rolling over the foot while barefoot.  Patient is familiar to Podiatry and was seen on previous admit.  She was being followed outpatient by my colleague Dr. Farooq was last seen on 10/02/2023 where he noted purulent drainage and place patient on doxycycline.  Patient also has known history of peripheral arterial occlusive disease and is followed by vascular surgery.  at bedside assisting with history      Chief Complaint   Patient presents with    Weight Loss     Pt;s  reports pt is losing weight and sleeping a lot.  Decreased appetite since  x6 days.  PT is a dialysis pt Tu-Th-Sat.             Scheduled Meds:   apixaban  2.5 mg Oral BID    lanthanum  1,000 mg Oral TID WM    [START ON 10/11/2023] levothyroxine  75 mcg Oral Before breakfast    [START ON 10/11/2023] metoprolol succinate  50 mg Oral Daily    [START ON 10/11/2023] piperacillin-tazobactam (Zosyn) IV (PEDS and ADULTS) (extended infusion is not appropriate)  4.5 g Intravenous Q12H    [START ON 10/11/2023] sacubitriL-valsartan  2 tablet Oral BID    senna-docusate 8.6-50 mg  1 tablet Oral BID    sodium chloride 0.9%  10 mL Intravenous Q8H    [START ON 10/11/2023] vitamin renal formula (B-complex-vitamin c-folic acid)  1 capsule Oral Daily     Continuous Infusions:  PRN Meds:acetaminophen, dextrose 10%, dextrose 10%, glucagon (human recombinant), glucose, glucose, HYDROcodone-acetaminophen, melatonin, naloxone, ondansetron, prochlorperazine, Pharmacy to dose Vancomycin consult **AND** vancomycin - pharmacy to dose    Review of patient's allergies indicates:   Allergen Reactions    Coreg [carvedilol] Other (See Comments)     Nausea/vomiting    Allopurinol      Other reaction(s): abnormal transaminases        Past Medical History:   Diagnosis Date    Anemia     Anticoagulant long-term use     Atrial fibrillation     Bronchitis 03/01/2017    Cancer 2016    kidney cancer    CHF (congestive heart failure), NYHA class II, chronic, systolic     CMV (cytomegalovirus) antibody positive     Encounter for blood transfusion     ESRD (end stage renal disease)     Essential hypertension 09/23/2015    H/O herpes simplex type 2 infection     Herpes simplex type 1 antibody positive     History of kidney cancer     s/p left nephrectomy 1/2016    Hyperparathyroidism, unspecified     Hyperuricemia without signs of inflammatory arthritis and tophaceous disease     Kidney stones     LGSIL (low grade squamous intraepithelial dysplasia)     Myocardiopathy 07/21/2017    Prediabetes     Proteinuria     Renal disorder      Thyroid nodule     Urate nephropathy      Past Surgical History:   Procedure Laterality Date    BREAST CYST EXCISION      COLONOSCOPY N/A 11/12/2015    COLONOSCOPY N/A 3/12/2021    Procedure: COLONOSCOPY;  Surgeon: Brendon Lanier MD;  Location: [facility] ENDO;  Service: Endoscopy;  Laterality: N/A;  covid test 3/9, labs prior, prep instr mailed -ml    EXCISION OF ARTERIOVENOUS FISTULA Left 9/27/2023    Procedure: EXCISION, AV FISTULA;  Surgeon: FAIRDEH Muñoz III, MD;  [facility];  Service: Vascular;  Laterality: Left;  LUE AV graft excision    INSERTION OF BIVENTRICULAR IMPLANTABLE CARDIOVERTER-DEFIBRILLATOR (ICD)  04/2021    INSERTION OF TUNNELED CENTRAL VENOUS CATHETER (CVC) WITH SUBCUTANEOUS PORT N/A 1/25/2023    Procedure: INSERTION, DUAL LUMEN CATHETER WITH PORT, WITH IMAGING GUIDANCE;  Surgeon: FARIDEH Muñoz III, MD;  [facility];  Service: Peripheral Vascular;  Laterality: N/A;  possinble permcath placment    NEPHRECTOMY-LAPAROSCOPIC Left 01/12/2016    PERCUTANEOUS TRANSLUMINAL ANGIOPLASTY OF ARTERIOVENOUS FISTULA Left 4/19/2023    Procedure: PTA, AV FISTULA;  Surgeon: FARIDEH Muñoz III, MD;  Location: [facility] CATH LAB;  Service: Peripheral Vascular;  Laterality: Left;    PERITONEAL CATHETER INSERTION      Permacath insertion  01/12/2017    PLACEMENT OF ARTERIOVENOUS GRAFT  12/28/2022    Procedure: INSERTION, GRAFT, ARTERIOVENOUS;  Surgeon: FARIDEH Muñoz III, MD;  Location: [facility] OR [facility] FLR;  Service: Peripheral Vascular;;    REMOVAL, GRAFT, ARTERIOVENOUS, UPPER EXTREMITY Left 1/25/2023    Procedure: REMOVAL, GRAFT, ARTERIOVENOUS, UPPER EXTREMITY;  Surgeon: FARIDEH Muñoz III, MD;  Location: [facility] OR [facility] FLR;  Service: Peripheral Vascular;  Laterality: Left;    REVISION OF ARTERIOVENOUS FISTULA Left 5/1/2023    Procedure: REVISION, AV FISTULA;  Surgeon: FARIDEH Muñoz III, MD;  Location: [facility] OR [facility] FLR;  Service: Peripheral Vascular;  Laterality: Left;  LUE AVG revision     REVISION OF PROCEDURE INVOLVING ARTERIOVENOUS GRAFT Left 12/28/2022    Procedure: REVISION, PROCEDURE INVOLVING ARTERIOVENOUS GRAFT;  Surgeon: FARIDEH Muñoz III, MD;  [facility];  Service: Peripheral Vascular;  Laterality: Left;    REVISION OF PROCEDURE INVOLVING ARTERIOVENOUS GRAFT Left 7/21/2023    Procedure: LEFT ARM ARTERIOVENOUS GRAFT EXCISION  AND LIGATION;  Surgeon: Obi Werner MD;  Location: [facility] OR;  Service: Vascular;  Laterality: Left;  Left AVG excision and revision with interposition    SALPINGOOPHORECTOMY Right 2016    KJB---DAVINCI    TONSILLECTOMY      TUBAL LIGATION         Family History       Problem Relation (Age of Onset)    Cataracts Maternal Aunt    Diabetes Father, Maternal Grandfather    Heart disease Sister    Hypertension Mother    Kidney disease Father    No Known Problems Sister, Sister, Brother, Brother, Maternal Uncle, Paternal Aunt, Paternal Uncle, Maternal Grandmother, Paternal Grandmother, Paternal Grandfather, Son, Son, Other          Tobacco Use    Smoking status: Never     Passive exposure: Never    Smokeless tobacco: Never   Substance and Sexual Activity    Alcohol use: No    Drug use: Yes     Types: Hydrocodone    Sexual activity: Not Currently     Review of Systems   Constitutional:  Positive for activity change, appetite change, fatigue and unexpected weight change. Negative for chills and fever.   Respiratory:  Negative for cough and shortness of breath.    Cardiovascular:  Positive for leg swelling. Negative for chest pain.   Gastrointestinal:  Negative for diarrhea, nausea and vomiting.   Musculoskeletal:  Positive for arthralgias and myalgias.   Skin:  Positive for color change and wound.   Neurological:  Negative for weakness.        + paresthesia      Objective:     Vital Signs (Most Recent):  Temp: (!) 95.9 °F (35.5 °C) (10/10/23 1725)  Pulse: 78 (10/10/23 1725)  Resp: (!) 29 (10/10/23 1725)  BP: (!) 133/90 (10/10/23 1725)  SpO2: 100 %  "(10/10/23 1725) Vital Signs (24h Range):  Temp:  [95.2 °F (35.1 °C)-95.9 °F (35.5 °C)] 95.9 °F (35.5 °C)  Pulse:  [71-87] 78  Resp:  [15-29] 29  SpO2:  [100 %] 100 %  BP: (120-135)/(77-90) 133/90     Weight: 38 kg (83 lb 12.4 oz)  Body mass index is 14.38 kg/m².    Physical Exam  Constitutional:       General: She is awake. She is not in acute distress.     Appearance: She is underweight.   Cardiovascular:      Comments: Dorsalis pedis and posterior tibial pulses are diminished Bilaterally. Toes are cool to touch. Feet are warm proximally.There is decreased digital hair. Skin is atrophic, slightly hyperpigmented, and feet are mildly edematous      Musculoskeletal:      Comments: Decreased first MPJ range of motion     Patient has hammertoes of digits 2-5 bilateral partially reducible     Fat pad atrophy to heels and met heads bilateral   Skin:     Findings: Wound (see below) present.   Neurological:      Mental Status: She is alert.      Sensory: No sensory deficit.   Psychiatric:         Behavior: Behavior is cooperative.         10/10/2023    Left  Unstageable lesion to the dorsal left 3rd digit with local edema and erythema and tender to palpation but without fluctuance or crepitus or drainage.      Area of pressure to the plantar left heel      Posterior left Achilles primarily fibrinous to the level of subcutaneous tissue      Laboratory:  A1C:   Recent Labs   Lab 08/25/23  1704   HGBA1C 4.1     CBC:   Recent Labs   Lab 10/10/23  1200 10/10/23  1342   WBC 5.60  --    RBC 3.21*  --    HGB 10.7*  --    HCT 36.0* 32*     --    *  --    MCH 33.3*  --    MCHC 29.7*  --      CMP:   Recent Labs   Lab 10/10/23  1320   GLU 73   CALCIUM 8.0*   ALBUMIN 2.4*   PROT 5.7*      K 3.7   CO2 18*      BUN 19   CREATININE 3.0*   ALKPHOS 403*   ALT 37   *   BILITOT 1.0     CRP: No results for input(s): "CRP" in the last 168 hours.  ESR: No results for input(s): "SEDRATE" in the last 168 " hours.  Microbiology Results (last 7 days)       Procedure Component Value Units Date/Time    Clostridium difficile EIA [[id number]]     Order Status: No result Specimen: Stool     Blood Culture #2 **CANNOT BE ORDERED STAT** [[id number]] Collected: 10/10/23 1232    Order Status: Sent Specimen: Blood from Peripheral, Forearm, Right Updated: 10/10/23 1238    Blood Culture #1 **CANNOT BE ORDERED STAT** [[id number]] Collected: 10/10/23 1201    Order Status: Sent Specimen: Blood from Peripheral, Forearm, Right Updated: 10/10/23 1212            Diagnostic Results:  Imaging Results              CT Chest Abdomen Pelvis With Contrast (xpd) (Final result)  Result time 10/10/23 16:47:13      Final result by Yaneth Ray MD (10/10/23 16:47:13)                   Impression:      No finding to correlate with the clinical history.    Two new small pulmonary nodules, nonspecific, can be followed on subsequent exams.    Minimal area of ground-glass opacity in the right lower lobe with a small parenchymal opacity in the left upper lobe, new compared to prior imaging, nonspecific, may relate to edema, developing infection, aspiration other etiology.    Possible small hypodensity in the pancreas    Additional findings as above      Electronically signed by: Yaneth Ray MD  Date:    10/10/2023  Time:    16:47               Narrative:    EXAMINATION:  CT CHEST ABDOMEN PELVIS WITH CONTRAST (XPD)    CLINICAL HISTORY:  Sepsis;    TECHNIQUE:  Low dose axial images, sagittal and coronal reformations were obtained from the thoracic inlet to the pubic symphysis following the IV administration of 75 mL of Omnipaque 350 and no oral contrast.  Note that the timing of the abdominal portion of the examination is early, basically arterial phase imaging.    COMPARISON:  July 2023    FINDINGS:  Chest:    Evaluation of the lungs is limited due to motion. Some artifact created due to pacing device along the left chest wall.    Cluster of  tree-in-bud nodules within the anterior right upper lobe similar to prior imaging.  A few nodular foci within the lungs are stable.  There are new small nodules, series 2, image 67, image 77.    Mild ground-glass opacity at the right lung base new.  Minimal parenchymal opacity in the left upper lobe new.    Incompletely imaged dialysis access at the left axilla.  Adjacent calcification is felt to relate to vascular structure.  There is a central venous catheter entering via the left subclavian, with tip at the right atrium.  There is no significant mediastinal or hilar lymphadenopathy.    The heart is enlarged.  There is no significant pleural effusion.  There is small pericardial effusion.  Calcification present along the wall of the aorta with mild ectasia of the ascending aorta.    Abdomen/pelvis:    Lines external to the patient create some artifact.  Timing of the contrast bolus also decreases sensitivity.  There is reflux of contrast into the inferior vena cava and hepatic veins likely relating to right heart dysfunction.    Evaluation of the liver parenchyma limited, with no definite abnormality seen.  Hypodensity seen on previous exam is not well visualized today.  The spleen is not enlarged.    The stomach is unremarkable.  There is cholelithiasis.  No evidence of biliary ductal dilatation.    There is no evidence of adrenal mass.  Questionable small hypodensity in the expected region of the pancreatic tail, evaluation of this region somewhat limited.    The right kidney demonstrates small size.  Multiple hypodense structures within the right kidney difficult to characterize well but appear grossly similar to prior imaging.    Left kidney has been removed.  Soft tissue density previously described in the nephrectomy bed is overall similar but difficult to distinguish from adjacent pancreatic tail.    There is advanced calcification along the wall of the aorta and branches.  No aortic aneurysm.  No  significant periaortic lymphadenopathy.  There is calcification along the wall of inferior vena cava and branches.    Bladder not well distended or well evaluated.  Uterus is likely present.  The evaluation of the bowel is somewhat limited, with no evidence of bowel distension.                                       X-Ray Chest AP Portable (Final result)  Result time 10/10/23 13:53:44      Final result by Yaneth Ray MD (10/10/23 13:53:44)                   Impression:      As above      Electronically signed by: Yaneth Ray MD  Date:    10/10/2023  Time:    13:53               Narrative:    EXAMINATION:  XR CHEST AP PORTABLE    CLINICAL HISTORY:  End stage renal disease    TECHNIQUE:  Single frontal view of the chest was performed.    COMPARISON:  August 25, 2023    FINDINGS:  The heart size is markedly enlarged but similar to previous study.  There are surgical changes within the soft tissues of the left arm.  There is a central venous catheter entering the left subclavian with tip in right atrium.  Pacer device present over the left chest.  Vascular calcification present along the wall of the aorta.    There is osseous demineralization.  There is no evidence of significant pleural fluid on this single view.  No evidence of focal consolidation.  Prominence of pulmonary vasculature similar to prior exam.                                       X-Ray Humerus 2 View Left (Final result)  Result time 10/10/23 13:51:09      Final result by Cameron Pierce MD (10/10/23 13:51:09)                   Impression:      1. No convincing acute displaced fracture or dislocation of the left humerus, please see above for additional findings.      Electronically signed by: Cameron Pierce MD  Date:    10/10/2023  Time:    13:51               Narrative:    EXAMINATION:  XR HUMERUS 2 VIEW LEFT    CLINICAL HISTORY:  End stage renal disease    COMPARISON:  None    FINDINGS:  Two views left humerus.    There is osteopenia.   No acute displaced fracture or dislocation of the humerus.  Vascular stent projects over the axilla.  Surgical changes are noted of the soft tissues of the upper arm.  No acute displaced rib fracture.  There is coarse interstitial attenuation involving the visualized left lung zones.  Pacing device noted.  There is marked vascular calcification noting suspected upper extremity dialysis fistula.  There is cachectic change of the body wall.                                       X-Ray Foot Complete Left (Final result)  Result time 10/10/23 13:49:41      Final result by Cameron Pierce MD (10/10/23 13:49:41)                   Impression:      1. No acute displaced fracture or dislocation of the foot.      Electronically signed by: Cameron Pierce MD  Date:    10/10/2023  Time:    13:49               Narrative:    EXAMINATION:  XR FOOT COMPLETE 3 VIEW LEFT    CLINICAL HISTORY:  .  Unspecified open wound of unspecified toe(s) without damage to nail, initial encounter    TECHNIQUE:  AP, lateral and oblique views of the left foot were performed.    COMPARISON:  07/26/2023    FINDINGS:  Three views left foot.    There is diffuse osteopenia.  There is marked vascular calcification.  There are degenerative changes of the foot.  No convincing acute displaced fracture or dislocation of the foot.  No radiopaque foreign body.  No significant edema.                                      Assessment/Plan:     Active Diagnoses:    Diagnosis Date Noted POA    PRINCIPAL PROBLEM:  Severe sepsis [A41.9, R65.20] 10/29/2022 Yes    Weight loss [R63.4] 10/10/2023 Yes    Body mass index (BMI) less than 19 [Z68.1] 09/28/2023 Not Applicable    Chronic combined systolic and diastolic heart failure [I50.42] 08/25/2023 Yes    Metastatic renal cell carcinoma to lung, unspecified laterality [C78.00, C64.9] 08/11/2023 Yes    Hypothyroidism [E03.9] 07/19/2023 Yes     Chronic    PAF (paroxysmal atrial fibrillation) [I48.0] 07/25/2022 Yes     Chronic     ICD (implantable cardioverter-defibrillator) in place - SubQ [Z95.810] 07/15/2021 Yes    Anemia in ESRD (end-stage renal disease) [N18.6, D63.1]  Yes     Chronic    ESRD (end stage renal disease) on dialysis [N18.6, Z99.2]  Not Applicable     Chronic    Essential hypertension [I10] 09/23/2015 Yes     Chronic    Hyperparathyroidism, secondary renal [N25.81]  Yes      Problems Resolved During this Admission:       Education about the prevention of limb loss.    Discussed wound healing cycle, skin integrity, ways to care for skin.Counseled patient on the effects of biomechanical pressure and PAOD on healing.     Recent foot Imaging reviewed, no bone involvement or gas in the soft tissues noted.     Wound cultures obtained by my colleague on October 2nd no growth to date.    Wound to the left 3rd digit is unstageable but stable in appearance.   Would need more advanced imaging to the foot if there is suspicion that foot may be an infection source.  Vashe wet to dry to the digit to try to soften the eschar and perhaps allow for debridement in the future, nursing wound care orders placed.  Nursing instructed to ensure patient has heel protector boots on given the areas of pressure already present to the left lower extremity that we do not want to regress during admission.    Thank you for your consult. I will follow-up with patient. Please contact us if you have any additional questions.    Ginger Mandel DPM  Podiatry  [facility] - Emergency Dept

## 2023-10-10 NOTE — HPI
Ms Eva Bloom is a 58 y.o. woman with ESRD on HD TTS via THDC, systolic/diastolic CHF, PAD with toe wounds who presents for weight loss.  reports weight loss over last few weeks. Her appetite is poor but stable. No nausea, vomiting, dysphagia, abdominal pain. Reports diarrhea liquid stools 3-4x/day over last week. Does not make urine. She is s/p recent L AVG excision, site is healing well with no drainage or erythema. S/p L THDC placement, also working well with no issues. She does have L 3rd toe ulceration present, following with Podiatry. Was prescribed doxycycline after last appointment but never started it. Does have RCC following with Oncology on immunotherapy.     In ED, noted hypothermic and drowsy. Lactic elevated. Started warming blanket and broad antibiotics. Admitted to ICU.

## 2023-10-10 NOTE — ASSESSMENT & PLAN NOTE
Macrocytic anemia present on admit. No signs of bleeding.   - EPO with HD  - B12, folate normal when last checked. TSAT appropriate  - CBC in AM to monitor

## 2023-10-10 NOTE — ASSESSMENT & PLAN NOTE
This patient does have evidence of infective focus. My overall impression is severe sepsis.  Source: Skin and Soft Tissue (location feet) and Unknown vs bacteremia   Antibiotics given-   Antibiotics (72h ago, onward)    Start     Stop Route Frequency Ordered    10/11/23 0100  piperacillin-tazobactam (ZOSYN) 4.5 g in dextrose 5 % in water (D5W) 100 mL IVPB (MB+)         -- IV Every 12 hours (non-standard times) 10/10/23 1556    10/10/23 1345  vancomycin - pharmacy to dose  (vancomycin IVPB (PEDS and ADULTS))        See Hyperspace for full Linked Orders Report.    -- IV pharmacy to manage frequency 10/10/23 1245        Latest lactate reviewed-  Recent Labs   Lab 10/10/23  1232   LACTATE 5.3*     Organ dysfunction indicated by Encephalopathy    Fluid challenge Contraindicated- Fluid bolus is contraindicated in this patient due to End Stage Renal Disease- given 500cc bolus   Post- resuscitation assessment Yes Perfusion exam was performed within 6 hours of septic shock presentation after bolus shows Adequate tissue perfusion assessed by non-invasive monitoring     - check blood cultures- pending  - does not make urine  - Podiatry to evaluate foot wounds  - CT chest/abd/pelvis pending   - repeat lactic downtrending  - warming blanket for hypothermia

## 2023-10-10 NOTE — CARE UPDATE
[facility] Intensive Care  ICU Shift Summary  Date: 10/10/2023      Prehospitalization: Home  Admit Date / LOS : 10/10/2023/ 0 days    Diagnosis: Severe sepsis    Consults:        Active: Nephro, Palliative, and Wound Care & podiatry       Needed: N/A     Code Status: Full Code   Advanced Directive: <no information>    LDA:  Lines/Drains/Airways       Central Venous Catheter Line  Duration                  Hemodialysis Catheter left subclavian -- days         Hemodialysis AV Graft 04/19/23 1057 Left upper arm 174 days              Peripheral Intravenous Line  Duration                  Peripheral IV - Single Lumen 10/10/23 1300 20 G Anterior;Right Forearm <1 day         Peripheral IV - Single Lumen 10/10/23 1351 20 G;1 3/4 in Anterior;Right Upper Arm <1 day                  Central Lines/Site/Justification: temporary hemodialysis catheter for HD TTS  Urinary Cath/Order/Justification:Patient Does Not Have Urinary Catheter    Vasopressors/Infusions:        GOALS: Volume/ Hemodynamic: MAP > 65                     RASS: 0  alert and calm    Pain Management: none       Pain Controlled: yes     Rhythm: NSR    Respiratory Device: Room Air                      Most Recent SBT/ SAT: N/A       MOVE Screen: FAIL  ICU Liberation: not applicable    VTE Prophylaxis: Pharm  Mobility: Bedrest  Stress Ulcer Prophylaxis: No    Isolation: Special Contact    Dietary:   Current Diet Order   Procedures    Diet renal      Tolerance: yes  Advancement: @ goal    I & O (24h):    Intake/Output Summary (Last 24 hours) at 10/10/2023 1833  Last data filed at 10/10/2023 1351  Gross per 24 hour   Intake 350 ml   Output --   Net 350 ml        Restraints: No    Significant Dates:  Post Op Date: N/A  Rescue Date: N/A  Imaging/ Diagnostics: N/A    Noteworthy Labs:  none    COVID Test: (--)  CBC/Anemia Labs: Coags:    Recent Labs   Lab 10/10/23  1200 10/10/23  1342   WBC 5.60  --    HGB 10.7*  --    HCT 36.0* 32*     --    *  --    RDW  "22.4*  --     No results for input(s): "PT", "INR", "APTT" in the last 168 hours.     Chemistries:   Recent Labs   Lab 10/10/23  1320      K 3.7      CO2 18*   BUN 19   CREATININE 3.0*   CALCIUM 8.0*   PROT 5.7*   BILITOT 1.0   ALKPHOS 403*   ALT 37   *   MG 2.1   PHOS 3.9        Cardiac Enzymes: Ejection Fractions:    Recent Labs     10/10/23  1200   TROPONINI 0.421*    EF   Date Value Ref Range Status   05/08/2023 23 % Final        POCT Glucose: HbA1c:    Recent Labs   Lab 10/10/23  1441   POCTGLUCOSE 96    Hemoglobin A1C   Date Value Ref Range Status   08/25/2023 4.1 4.0 - 5.6 % Final     Comment:     ADA Screening Guidelines:  5.7-6.4%  Consistent with prediabetes  >or=6.5%  Consistent with diabetes    High levels of fetal hemoglobin interfere with the HbA1C  assay. Heterozygous hemoglobin variants (HbS, HgC, etc)do  not significantly interfere with this assay.   However, presence of multiple variants may affect accuracy.             ICU LOS 1h  Level of Care: OK to Transfer    Chart Check: 12 HR Done  Shift Summary/Plan for the shift: Patient here for sepsis. On bear hugger, improved appetite. No bowel movements since arrival to hospital. Multiple wounds (see media), Podiatry and WOC consulted.   "

## 2023-10-10 NOTE — ASSESSMENT & PLAN NOTE
Wt Readings from Last 3 Encounters:   10/10/23 1057 38 kg (83 lb 12.4 oz)   10/02/23 0726 40.8 kg (90 lb)   09/27/23 1228 40.8 kg (90 lb)     Body mass index is 14.38 kg/m².    - etiology unclear- potential sepsis vs worsening malignancy vs other  - nutrition consult

## 2023-10-10 NOTE — PROGRESS NOTES
"Pharmacokinetic Initial Assessment: IV Vancomycin    Assessment/Plan:    Initiate intravenous vancomycin with loading dose of 750 mg once with subsequent doses when random concentrations are less than 20 mcg/mL  Desired empiric serum trough concentration is 10 to 20 mcg/mL  Draw vancomycin random level on 10/11/23 at 06:00.  Pharmacy will continue to follow and monitor vancomycin.      Please contact pharmacy at extension [phone] with any questions regarding this assessment.     Thank you for the consult,   Agata Nuñez       Patient brief summary:  Eva Bloom is a 58 y.o. female initiated on antimicrobial therapy with IV Vancomycin for treatment of suspected sepsis    Drug Allergies:   Review of patient's allergies indicates:   Allergen Reactions    Coreg [carvedilol] Other (See Comments)     Nausea/vomiting    Allopurinol      Other reaction(s): abnormal transaminases       Actual Body Weight:   38 kg    Renal Function:   Estimated Creatinine Clearance: 12.1 mL/min (A) (based on SCr of 3 mg/dL (H)).,     Dialysis Method (if applicable):  intermittent HD    CBC (last 72 hours):  Recent Labs   Lab Result Units 10/10/23  1200   WBC K/uL 5.60   Hemoglobin g/dL 10.7*   Hematocrit % 36.0*   Platelets K/uL 210   Gran % % 68.3   Lymph % % 24.3   Mono % % 3.6*   Eosinophil % % 2.0   Basophil % % 1.4   Differential Method  Automated       Metabolic Panel (last 72 hours):  Recent Labs   Lab Result Units 10/10/23  1320   Sodium mmol/L 139   Potassium mmol/L 3.7   Chloride mmol/L 104   CO2 mmol/L 18*   Glucose mg/dL 73   BUN mg/dL 19   Creatinine mg/dL 3.0*   Albumin g/dL 2.4*   Total Bilirubin mg/dL 1.0   Alkaline Phosphatase U/L 403*   AST U/L 106*   ALT U/L 37   Magnesium mg/dL 2.1   Phosphorus mg/dL 3.9       Drug levels (last 3 results):  No results for input(s): "VANCOMYCINRA", "VANCORANDOM", "VANCOMYCINPE", "VANCOPEAK", "VANCOMYCINTR", "VANCOTROUGH" in the last 72 hours.    Microbiologic Results:  Microbiology " Results (last 7 days)       Procedure Component Value Units Date/Time    Blood Culture #2 **CANNOT BE ORDERED STAT** [[id number]] Collected: 10/10/23 1232    Order Status: Sent Specimen: Blood from Peripheral, Forearm, Right Updated: 10/10/23 1238    Blood Culture #1 **CANNOT BE ORDERED STAT** [[id number]] Collected: 10/10/23 1201    Order Status: Sent Specimen: Blood from Peripheral, Forearm, Right Updated: 10/10/23 1212

## 2023-10-10 NOTE — ASSESSMENT & PLAN NOTE
Lab Results   Component Value Date    TSH 14.474 (H) 10/10/2023   FT4 normal  TSH is improved from prior   - continue levothyroxine  - needs TSH rechecked outside of acute illness setting

## 2023-10-10 NOTE — SUBJECTIVE & OBJECTIVE
Past Medical History:   Diagnosis Date    Anemia     Anticoagulant long-term use     Atrial fibrillation     Bronchitis 03/01/2017    Cancer 2016    kidney cancer    CHF (congestive heart failure), NYHA class II, chronic, systolic     CMV (cytomegalovirus) antibody positive     Encounter for blood transfusion     ESRD (end stage renal disease)     Essential hypertension 09/23/2015    H/O herpes simplex type 2 infection     Herpes simplex type 1 antibody positive     History of kidney cancer     s/p left nephrectomy 1/2016    Hyperparathyroidism, unspecified     Hyperuricemia without signs of inflammatory arthritis and tophaceous disease     Kidney stones     LGSIL (low grade squamous intraepithelial dysplasia)     Myocardiopathy 07/21/2017    Prediabetes     Proteinuria     Renal disorder     Thyroid nodule     Urate nephropathy        Past Surgical History:   Procedure Laterality Date    BREAST CYST EXCISION      COLONOSCOPY N/A 11/12/2015    COLONOSCOPY N/A 3/12/2021    Procedure: COLONOSCOPY;  Surgeon: Brendon Lanier MD;  Location: [facility];  Service: Endoscopy;  Laterality: N/A;  covid test 3/9, labs prior, prep instr mailed -ml    EXCISION OF ARTERIOVENOUS FISTULA Left 9/27/2023    Procedure: EXCISION, AV FISTULA;  Surgeon: FARIDEH Muñoz III, MD;  Location: [facility] OR [address] [place] [address];  Service: Vascular;  Laterality: Left;  LUE AV graft excision    INSERTION OF BIVENTRICULAR IMPLANTABLE CARDIOVERTER-DEFIBRILLATOR (ICD)  04/2021    INSERTION OF TUNNELED CENTRAL VENOUS CATHETER (CVC) WITH SUBCUTANEOUS PORT N/A 1/25/2023    Procedure: INSERTION, DUAL LUMEN CATHETER WITH PORT, WITH IMAGING GUIDANCE;  Surgeon: FARIDEH Muñoz III, MD;  [facility];  Service: Peripheral Vascular;  Laterality: N/A;  possinble permcath placment    NEPHRECTOMY-LAPAROSCOPIC Left 01/12/2016    PERCUTANEOUS TRANSLUMINAL ANGIOPLASTY OF ARTERIOVENOUS FISTULA Left 4/19/2023    Procedure: PTA, AV FISTULA;  Surgeon: FARIDEH Muñoz  III, MD;  Location: [facility] CATH LAB;  Service: Peripheral Vascular;  Laterality: Left;    PERITONEAL CATHETER INSERTION      Permacath insertion  01/12/2017    PLACEMENT OF ARTERIOVENOUS GRAFT  12/28/2022    Procedure: INSERTION, GRAFT, ARTERIOVENOUS;  Surgeon: FARIDEH Muñoz III, MD;  [facility];  Service: Peripheral Vascular;;    REMOVAL, GRAFT, ARTERIOVENOUS, UPPER EXTREMITY Left 1/25/2023    Procedure: REMOVAL, GRAFT, ARTERIOVENOUS, UPPER EXTREMITY;  Surgeon: FARIDEH Muñoz III, MD;  [facility];  Service: Peripheral Vascular;  Laterality: Left;    REVISION OF ARTERIOVENOUS FISTULA Left 5/1/2023    Procedure: REVISION, AV FISTULA;  Surgeon: FARIDEH Muñoz III, MD;  [facility];  Service: Peripheral Vascular;  Laterality: Left;  LUE AVG revision    REVISION OF PROCEDURE INVOLVING ARTERIOVENOUS GRAFT Left 12/28/2022    Procedure: REVISION, PROCEDURE INVOLVING ARTERIOVENOUS GRAFT;  Surgeon: FARIDEH Muñoz III, MD;  [facility];  Service: Peripheral Vascular;  Laterality: Left;    REVISION OF PROCEDURE INVOLVING ARTERIOVENOUS GRAFT Left 7/21/2023    Procedure: LEFT ARM ARTERIOVENOUS GRAFT EXCISION  AND LIGATION;  Surgeon: Obi Werner MD;  Location: [facility] OR;  Service: Vascular;  Laterality: Left;  Left AVG excision and revision with interposition    SALPINGOOPHORECTOMY Right 2016    KJB---DAVINCI    TONSILLECTOMY      TUBAL LIGATION         Review of patient's allergies indicates:   Allergen Reactions    Coreg [carvedilol] Other (See Comments)     Nausea/vomiting    Allopurinol      Other reaction(s): abnormal transaminases       Current Facility-Administered Medications on File Prior to Encounter   Medication    0.9%  NaCl infusion     Current Outpatient Medications on File Prior to Encounter   Medication Sig    apixaban (ELIQUIS) 2.5 mg Tab Take 1 tablet (2.5 mg total) by mouth 2 (two) times daily.    aspirin 81 MG Chew Take 1 tablet (81 mg  total) by mouth once daily.    cabozantinib (CABOMETYX) 60 mg Tab TAKE ONE TABLET BY MOUTH ONCE DAILY AT THE SAME TIME ON AN EMPTY STOMACH AT LEAST 1 HOUR BEFORE OR 2 HOURS AFTER EATING. AVOID GRAPEFRUIT PRODUCTS    cloNIDine 0.3 mg/24 hr td ptwk (CATAPRES) 0.3 mg/24 hr Place 1 patch onto the skin every 7 days.    cyclobenzaprine (FLEXERIL) 5 MG tablet TAKE 1 TABLET BY MOUTH DAILY AS NEEDED FOR MUSCLE SPASMS.    ENTRESTO 49-51 mg per tablet TAKE 1 TABLET BY MOUTH TWICE A DAY    fluticasone propionate (FLONASE) 50 mcg/actuation nasal spray 1 spray (50 mcg total) by Each Nostril route 2 (two) times daily as needed (ear fullness).    HYDROcodone-acetaminophen (NORCO) 5-325 mg per tablet Take 1 tablet by mouth every 6 (six) hours as needed for Pain.    levothyroxine (SYNTHROID) 75 MCG tablet Take 1 tablet (75 mcg total) by mouth once daily.    metoprolol succinate (TOPROL-XL) 100 MG 24 hr tablet Take 1/2 tablet (50mg) once daily    acetaminophen (TYLENOL) 500 MG tablet Take 500 mg by mouth every 6 (six) hours as needed for Pain.    doxycycline monohydrate 100 mg Tab Take 1 tablet (100 mg total) by mouth 2 (two) times daily. (Patient not taking: Reported on 10/3/2023)    epoetin david-epbx (RETACRIT INJ) Epoetin david - epbx (Retacrit)    hydrALAZINE (APRESOLINE) 100 MG tablet Take 1 tablet (100 mg total) by mouth every 12 (twelve) hours.    lanthanum (FOSRENOL) 1000 MG chewable tablet Take 1 tablet by mouth.    lidocaine-prilocaine (EMLA) cream APPLY ATLEAST 30 MINUTES BEFORE TREATMENT 3 TIMES A WEEK    mv,Ca,min-folic acid-vit K1 (ONE-A-DAY WOMEN'S 50 PLUS) 400-20 mcg Tab Take 1 tablet by mouth once daily.    naloxone (NARCAN) 1 mg/mL injection 2 mg (1 mg per nostril) by Nasal route as needed for opioid overdose; may repeat in 3 to 5 minutes if not effective. Call 911 (Patient not taking: Reported on 10/3/2023)    sacubitriL-valsartan (ENTRESTO) 24-26 mg per tablet Take 1 tablet by mouth 2 (two) times daily. (Patient not  taking: Reported on 10/3/2023)    [DISCONTINUED] amLODIPine (NORVASC) 5 MG tablet TAKE 1 TABLET BY MOUTH EVERY DAY     Family History       Problem Relation (Age of Onset)    Cataracts Maternal Aunt    Diabetes Father, Maternal Grandfather    Heart disease Sister    Hypertension Mother    Kidney disease Father    No Known Problems Sister, Sister, Brother, Brother, Maternal Uncle, Paternal Aunt, Paternal Uncle, Maternal Grandmother, Paternal Grandmother, Paternal Grandfather, Son, Son, Other          Tobacco Use    Smoking status: Never     Passive exposure: Never    Smokeless tobacco: Never   Substance and Sexual Activity    Alcohol use: No    Drug use: Yes     Types: Hydrocodone    Sexual activity: Not Currently     Review of Systems   Constitutional:  Positive for fatigue. Negative for appetite change, chills and fever.   HENT:  Negative for sinus pressure, sinus pain, sore throat and trouble swallowing.    Respiratory:  Negative for cough, chest tightness and shortness of breath.    Cardiovascular:  Negative for chest pain, palpitations and leg swelling.   Gastrointestinal:  Positive for diarrhea. Negative for abdominal distention, abdominal pain, constipation, nausea and vomiting.   Musculoskeletal:  Negative for arthralgias and myalgias.   Skin:  Positive for wound.   Neurological:  Negative for numbness and headaches.   Psychiatric/Behavioral:  Negative for confusion.      Objective:     Vital Signs (Most Recent):  Temp: (!) 95.5 °F (35.3 °C) (10/10/23 1547)  Pulse: 77 (10/10/23 1547)  Resp: 15 (10/10/23 1547)  BP: 120/80 (10/10/23 1546)  SpO2: 100 % (10/10/23 1547) Vital Signs (24h Range):  Temp:  [95.2 °F (35.1 °C)-95.5 °F (35.3 °C)] 95.5 °F (35.3 °C)  Pulse:  [71-87] 77  Resp:  [15-22] 15  SpO2:  [100 %] 100 %  BP: (120-135)/(77-89) 120/80     Weight: 38 kg (83 lb 12.4 oz)  Body mass index is 14.38 kg/m².     Physical Exam  Vitals and nursing note reviewed.   Constitutional:       General: She is not in  acute distress.     Appearance: She is not toxic-appearing.      Comments: drowsy   HENT:      Head:      Comments: Temporal wasting     Nose: Nose normal.      Mouth/Throat:      Mouth: Mucous membranes are moist.   Eyes:      General: No scleral icterus.     Conjunctiva/sclera: Conjunctivae normal.   Cardiovascular:      Rate and Rhythm: Normal rate and regular rhythm.      Pulses: Normal pulses.      Heart sounds: Normal heart sounds.      Comments: L ICD   Pulmonary:      Effort: Pulmonary effort is normal.      Breath sounds: Normal breath sounds.      Comments: Room air  Abdominal:      General: Bowel sounds are normal. There is no distension.      Palpations: Abdomen is soft. There is no mass.      Tenderness: There is no abdominal tenderness. There is no guarding.   Musculoskeletal:      Right lower leg: No edema.      Left lower leg: No edema.   Skin:     General: Skin is warm and dry.      Comments: LUE bandage in place, no drainage or erythema. L THDC in place, no erythema. L 3rd toe wound, no surrounding erythema   Neurological:      Mental Status: She is oriented to person, place, and time.                  Significant Labs: All pertinent labs within the past 24 hours have been reviewed.    Significant Imaging: I have reviewed all pertinent imaging results/findings within the past 24 hours.

## 2023-10-10 NOTE — Clinical Note
Diagnosis: Sepsis [824909]  Admitting Provider:: MANUEL YUN [[id number]]  Future Attending Provider: MECHELLE FIGUEROA [[id number]]  Reason for IP Medical Treatment  (Clinical interventions that can only be accomplished in the IP setting? ) :: further c lose monitoring and critical care  I certify that Inpatient services for greater than or equal to 2 midnights are medically necessary:: Yes  Plans for Post-Acute care--if anticipated (pick the single best option):: C. Discharge home with home health  services  Special Needs:: Fall Risk [15]

## 2023-10-11 ENCOUNTER — EXTERNAL HOME HEALTH (OUTPATIENT)
Dept: HOME HEALTH SERVICES | Facility: HOSPITAL | Age: 58
End: 2023-10-11
Payer: MEDICARE

## 2023-10-11 PROBLEM — E43 SEVERE PROTEIN-CALORIE MALNUTRITION: Status: ACTIVE | Noted: 2023-10-11

## 2023-10-11 PROBLEM — Z71.89 GOALS OF CARE, COUNSELING/DISCUSSION: Status: ACTIVE | Noted: 2023-10-11

## 2023-10-11 LAB
ALBUMIN SERPL BCP-MCNC: 2.3 G/DL (ref 3.5–5.2)
ALP SERPL-CCNC: 373 U/L (ref 55–135)
ALT SERPL W/O P-5'-P-CCNC: 38 U/L (ref 10–44)
ANION GAP SERPL CALC-SCNC: 18 MMOL/L (ref 8–16)
AST SERPL-CCNC: 91 U/L (ref 10–40)
BASOPHILS # BLD AUTO: 0.1 K/UL (ref 0–0.2)
BASOPHILS NFR BLD: 2 % (ref 0–1.9)
BILIRUB SERPL-MCNC: 1 MG/DL (ref 0.1–1)
BUN SERPL-MCNC: 26 MG/DL (ref 6–20)
C DIFF GDH STL QL: NEGATIVE
C DIFF TOX A+B STL QL IA: NEGATIVE
CALCIUM SERPL-MCNC: 8.6 MG/DL (ref 8.7–10.5)
CHLORIDE SERPL-SCNC: 102 MMOL/L (ref 95–110)
CO2 SERPL-SCNC: 18 MMOL/L (ref 23–29)
CREAT SERPL-MCNC: 3.6 MG/DL (ref 0.5–1.4)
DIFFERENTIAL METHOD: ABNORMAL
EOSINOPHIL # BLD AUTO: 0.1 K/UL (ref 0–0.5)
EOSINOPHIL NFR BLD: 2.4 % (ref 0–8)
ERYTHROCYTE [DISTWIDTH] IN BLOOD BY AUTOMATED COUNT: 22.4 % (ref 11.5–14.5)
EST. GFR  (NO RACE VARIABLE): 14 ML/MIN/1.73 M^2
GLUCOSE SERPL-MCNC: 70 MG/DL (ref 70–110)
HCT VFR BLD AUTO: 30.7 % (ref 37–48.5)
HGB BLD-MCNC: 9.2 G/DL (ref 12–16)
IMM GRANULOCYTES # BLD AUTO: 0.03 K/UL (ref 0–0.04)
IMM GRANULOCYTES NFR BLD AUTO: 0.6 % (ref 0–0.5)
LACTATE SERPL-SCNC: 4.2 MMOL/L (ref 0.5–2.2)
LYMPHOCYTES # BLD AUTO: 1 K/UL (ref 1–4.8)
LYMPHOCYTES NFR BLD: 19 % (ref 18–48)
MAGNESIUM SERPL-MCNC: 2.2 MG/DL (ref 1.6–2.6)
MCH RBC QN AUTO: 33.6 PG (ref 27–31)
MCHC RBC AUTO-ENTMCNC: 30 G/DL (ref 32–36)
MCV RBC AUTO: 112 FL (ref 82–98)
MONOCYTES # BLD AUTO: 0.4 K/UL (ref 0.3–1)
MONOCYTES NFR BLD: 6.9 % (ref 4–15)
NEUTROPHILS # BLD AUTO: 3.5 K/UL (ref 1.8–7.7)
NEUTROPHILS NFR BLD: 69.1 % (ref 38–73)
NRBC BLD-RTO: 2 /100 WBC
PHOSPHATE SERPL-MCNC: 4.9 MG/DL (ref 2.7–4.5)
PLATELET # BLD AUTO: 153 K/UL (ref 150–450)
PMV BLD AUTO: 11.5 FL (ref 9.2–12.9)
POTASSIUM SERPL-SCNC: 3.6 MMOL/L (ref 3.5–5.1)
PROT SERPL-MCNC: 5.6 G/DL (ref 6–8.4)
RBC # BLD AUTO: 2.74 M/UL (ref 4–5.4)
SODIUM SERPL-SCNC: 138 MMOL/L (ref 136–145)
VANCOMYCIN SERPL-MCNC: 13.4 UG/ML
WBC # BLD AUTO: 5.04 K/UL (ref 3.9–12.7)
WBC #/AREA STL HPF: NORMAL /[HPF]

## 2023-10-11 PROCEDURE — 87209 SMEAR COMPLEX STAIN: CPT | Performed by: HOSPITALIST

## 2023-10-11 PROCEDURE — 80202 ASSAY OF VANCOMYCIN: CPT | Performed by: HOSPITALIST

## 2023-10-11 PROCEDURE — 36415 COLL VENOUS BLD VENIPUNCTURE: CPT | Performed by: HOSPITALIST

## 2023-10-11 PROCEDURE — 84100 ASSAY OF PHOSPHORUS: CPT | Performed by: HOSPITALIST

## 2023-10-11 PROCEDURE — 11000001 HC ACUTE MED/SURG PRIVATE ROOM

## 2023-10-11 PROCEDURE — 25000003 PHARM REV CODE 250: Performed by: HOSPITALIST

## 2023-10-11 PROCEDURE — 83605 ASSAY OF LACTIC ACID: CPT | Performed by: HOSPITALIST

## 2023-10-11 PROCEDURE — 99223 PR INITIAL HOSPITAL CARE,LEVL III: ICD-10-PCS | Mod: GW,HPC,, | Performed by: REGISTERED NURSE

## 2023-10-11 PROCEDURE — 87427 SHIGA-LIKE TOXIN AG IA: CPT | Mod: 59 | Performed by: HOSPITALIST

## 2023-10-11 PROCEDURE — 87329 GIARDIA AG IA: CPT | Performed by: HOSPITALIST

## 2023-10-11 PROCEDURE — 89055 LEUKOCYTE ASSESSMENT FECAL: CPT | Performed by: HOSPITALIST

## 2023-10-11 PROCEDURE — 80053 COMPREHEN METABOLIC PANEL: CPT | Performed by: HOSPITALIST

## 2023-10-11 PROCEDURE — 99223 1ST HOSP IP/OBS HIGH 75: CPT | Mod: GW,HPC,, | Performed by: REGISTERED NURSE

## 2023-10-11 PROCEDURE — 99232 SBSQ HOSP IP/OBS MODERATE 35: CPT | Mod: GW,HPC,, | Performed by: PODIATRIST

## 2023-10-11 PROCEDURE — 25000003 PHARM REV CODE 250: Performed by: INTERNAL MEDICINE

## 2023-10-11 PROCEDURE — 85025 COMPLETE CBC W/AUTO DIFF WBC: CPT | Performed by: HOSPITALIST

## 2023-10-11 PROCEDURE — 63600175 PHARM REV CODE 636 W HCPCS: Performed by: HOSPITALIST

## 2023-10-11 PROCEDURE — 83735 ASSAY OF MAGNESIUM: CPT | Performed by: HOSPITALIST

## 2023-10-11 PROCEDURE — 87449 NOS EACH ORGANISM AG IA: CPT | Performed by: HOSPITALIST

## 2023-10-11 PROCEDURE — 87046 STOOL CULTR AEROBIC BACT EA: CPT | Mod: 59 | Performed by: HOSPITALIST

## 2023-10-11 PROCEDURE — 99232 PR SUBSEQUENT HOSPITAL CARE,LEVL II: ICD-10-PCS | Mod: GW,HPC,, | Performed by: PODIATRIST

## 2023-10-11 PROCEDURE — 87045 FECES CULTURE AEROBIC BACT: CPT | Performed by: HOSPITALIST

## 2023-10-11 PROCEDURE — A4216 STERILE WATER/SALINE, 10 ML: HCPCS | Performed by: HOSPITALIST

## 2023-10-11 RX ORDER — DRONABINOL 2.5 MG/1
2.5 CAPSULE ORAL 2 TIMES DAILY
Status: DISCONTINUED | OUTPATIENT
Start: 2023-10-11 | End: 2023-10-18 | Stop reason: HOSPADM

## 2023-10-11 RX ORDER — SODIUM CHLORIDE 9 MG/ML
INJECTION, SOLUTION INTRAVENOUS ONCE
Status: DISCONTINUED | OUTPATIENT
Start: 2023-10-11 | End: 2023-10-18 | Stop reason: HOSPADM

## 2023-10-11 RX ORDER — LOPERAMIDE HYDROCHLORIDE 2 MG/1
2 CAPSULE ORAL 4 TIMES DAILY PRN
Status: DISCONTINUED | OUTPATIENT
Start: 2023-10-11 | End: 2023-10-18 | Stop reason: HOSPADM

## 2023-10-11 RX ORDER — MUPIROCIN 20 MG/G
OINTMENT TOPICAL 2 TIMES DAILY
Status: COMPLETED | OUTPATIENT
Start: 2023-10-11 | End: 2023-10-16

## 2023-10-11 RX ORDER — SODIUM CHLORIDE 9 MG/ML
INJECTION, SOLUTION INTRAVENOUS
Status: DISCONTINUED | OUTPATIENT
Start: 2023-10-11 | End: 2023-10-18 | Stop reason: HOSPADM

## 2023-10-11 RX ADMIN — SACUBITRIL AND VALSARTAN 2 TABLET: 24; 26 TABLET, FILM COATED ORAL at 08:10

## 2023-10-11 RX ADMIN — LOPERAMIDE HYDROCHLORIDE 2 MG: 2 CAPSULE ORAL at 01:10

## 2023-10-11 RX ADMIN — APIXABAN 2.5 MG: 2.5 TABLET, FILM COATED ORAL at 08:10

## 2023-10-11 RX ADMIN — PIPERACILLIN AND TAZOBACTAM 4.5 G: 4; .5 INJECTION, POWDER, LYOPHILIZED, FOR SOLUTION INTRAVENOUS; PARENTERAL at 01:10

## 2023-10-11 RX ADMIN — LEVOTHYROXINE SODIUM 75 MCG: 75 TABLET ORAL at 05:10

## 2023-10-11 RX ADMIN — NEPHROCAP 1 CAPSULE: 1 CAP ORAL at 08:10

## 2023-10-11 RX ADMIN — PIPERACILLIN AND TAZOBACTAM 4.5 G: 4; .5 INJECTION, POWDER, LYOPHILIZED, FOR SOLUTION INTRAVENOUS; PARENTERAL at 12:10

## 2023-10-11 RX ADMIN — Medication 10 ML: at 02:10

## 2023-10-11 RX ADMIN — MUPIROCIN: 20 OINTMENT TOPICAL at 08:10

## 2023-10-11 RX ADMIN — Medication 10 ML: at 06:10

## 2023-10-11 RX ADMIN — LANTHANUM CARBONATE 1000 MG: 500 TABLET, CHEWABLE ORAL at 08:10

## 2023-10-11 RX ADMIN — Medication 10 ML: at 10:10

## 2023-10-11 RX ADMIN — Medication 6 MG: at 08:10

## 2023-10-11 RX ADMIN — LOPERAMIDE HYDROCHLORIDE 2 MG: 2 CAPSULE ORAL at 08:10

## 2023-10-11 RX ADMIN — ACETAMINOPHEN 650 MG: 325 TABLET ORAL at 02:10

## 2023-10-11 RX ADMIN — METOPROLOL SUCCINATE 50 MG: 50 TABLET, EXTENDED RELEASE ORAL at 08:10

## 2023-10-11 NOTE — ASSESSMENT & PLAN NOTE
Patient has Paroxysmal (<7 days) atrial fibrillation which is controlled. Patient is currently in sinus rhythm.    Rate control: BB  Anticoagulation: eliquis

## 2023-10-11 NOTE — ASSESSMENT & PLAN NOTE
- unclear source at this time per discussion with primary   - pt does have a toe wound but condition doesn't seem to warrant systemic symptoms   - prelim blood cultures currently negative   -  reports loose stools for several days prior to admission   - continued management per hospital primary

## 2023-10-11 NOTE — CONSULTS
[facility] - Intensive Care  Palliative Medicine  Consult Note    Patient Name: Eva Bloom  MRN: [id number]  Admission Date: 10/10/2023  Hospital Length of Stay: 1 days  Code Status: Full Code   Attending Provider: Cora Costa MD  Consulting Provider: Leila Munoz NP  Primary Care Physician: oSwmya Mccain MD  Principal Problem:Severe sepsis    Patient information was obtained from spouse/SO, past medical records, ER records and primary team.      Inpatient consult to Palliative Care  Consult performed by: Leila Munoz NP  Consult ordered by: Hudson Ramos MD  Reason for consult: goals of care and advanced care planning        Assessment/Plan:   Advance Care Planning      Cardiac/Vascular  Chronic combined systolic and diastolic heart failure  - chronic history noted; EF 35% on most recent echo   - diastolic HF that does cause intermittent SOB at rest and affects exertion/ADLs at times per discussion with family on abilities prior to admit   - pt and  previously with fair insight into life-limiting condition trajectory   -  contributes to appropriateness for hospice if/when aligns with GOC    ICD (implantable cardioverter-defibrillator) in place - SubQ  - noted   - if pt were to ever have a change in GOC/code status would need to discuss deactivation at that time       Renal/  ESRD (end stage renal disease) on dialysis  - long term HD pt  - significant kidney disease in family with pt's son (in 30s) also on HD as discussed in previous admission  - per , no complications with HD recently and wish to cont HD as long as pt is a candidate   - noted; continued management per nephrology and hospital primary     ID  * Severe sepsis  - unclear source at this time per discussion with primary   - pt does have a toe wound but condition doesn't seem to warrant systemic symptoms   - prelim blood cultures currently negative   -  reports loose stools for several days prior  to admission   - continued management per hospital primary     Oncology  Metastatic renal cell carcinoma to lung, unspecified laterality  - follows up with oncology as outpatient with recent visit 9/2023  - on cabozantinib (immunotherapy) as outpatient     Endocrine  Body mass index (BMI) less than 19  - pt with severe protein calorie malnutrition with >12% body weight loss in a little over 2 months (8/2023: weight was 103 lbs and is now 90lbs);  reports weight has been even lower recently (in 80s)   - current BMI: 15.48; albumin 2.3  -  reports increased lack of appetite in the past few months, especially correlated with symptoms which lead to admit   - unclear if patient also experiencing nausea with eating, which could be deterrent to eating, or just overall lack of appetite; plan for further symptom discussion with pt when able/willing to participate   - expressed transparent concern for pt's nutritional status and ability to tolerate/qualify for current treatments such as HD and onc. Immunotherapy if she continues to have declines in weight and stamina   - contributes to qualification for hospice if/when aligns with [facility]     Palliative Care  Goals of care, counseling/discussion  10/11/2023 - Consult   - consult received; interval chart reviewed in detail; discussed pt during ICU MDT rounds   - pt known to myself and palliative medicine team from previous admission   - met with patient and , Otis, at bedside; re-introduction to palliative medicine team and role in current care and admission   - pt very cachectic and lying in bed with minimal participation in discussion following orientation questions   -  provided update on recent history since last palliative visit, including decreased appetite in the past months with marked decrease last few weeks (see malnutrition)   - [facility] for continued full treatment at this time and work up for possible cause of symptoms   - plan for continued  "GOC/ACP discussion through admission, including potential improvement of symptoms associated with lack of appetite given concern for pt's overall condition/decline potentially becoming a barrier to qualifying for desired treatments   - emotional support provided   - Allowed time for questions/concerns; all addressed; expressed availability of myself/palliative team for additional questions/concerns     Thank you for your consult. I will follow-up with patient. Please contact us if you have any additional questions.     Total visit time: 70 minutes    70 mins visit time including: face to face time in discussion of symptom assessment, and exploring options and burdens of offered treatments.  This also includes non-face to face time preparing to see the patient including chart review, obtaining and/or reviewing separately obtained history, documenting clinical information in the electronic or other health record, independently interpreting results and communicating results to the patient/family/caregiver, family discussions by phone if not able to be present, coordination of care with other specialists, and discharge planning.     ACP time spent included in visit time: goals of care, advanced care planning, emotional support, formulating and communicating prognosis, exploring burden/ benefit of various approaches of treatment.     Leila Munoz NP  Palliative Medicine  [facility]      Subjective:     HPI:   From H&P; "Ms Eva Bloom is a 58 y.o. woman with ESRD on HD TTS via THDC, systolic/diastolic CHF, PAD with toe wounds who presents for weight loss.  reports weight loss over last few weeks. Her appetite is poor but stable. No nausea, vomiting, dysphagia, abdominal pain. Reports diarrhea liquid stools 3-4x/day over last week. Does not make urine. She is s/p recent L AVG excision, site is healing well with no drainage or erythema. S/p L THDC placement, also working well with " "no issues. She does have L 3rd toe ulceration present, following with Podiatry. Was prescribed doxycycline after last appointment but never started it. Does have RCC following with Oncology on immunotherapy.      In ED, noted hypothermic and drowsy. Lactic elevated. Started warming blanket and broad antibiotics. Admitted to ICU. "    Palliative medicine consulted for continuity of care, goals of care discussion, and advance care planning; for details of visit, see advance care planning section of plan.         Hospital Course:  No notes on file    Past Medical History:   Diagnosis Date    Anemia     Anticoagulant long-term use     Atrial fibrillation     Bronchitis 03/01/2017    Cancer 2016    kidney cancer    CHF (congestive heart failure), NYHA class II, chronic, systolic     CMV (cytomegalovirus) antibody positive     Encounter for blood transfusion     ESRD (end stage renal disease)     Essential hypertension 09/23/2015    H/O herpes simplex type 2 infection     Herpes simplex type 1 antibody positive     History of kidney cancer     s/p left nephrectomy 1/2016    Hyperparathyroidism, unspecified     Hyperuricemia without signs of inflammatory arthritis and tophaceous disease     Kidney stones     LGSIL (low grade squamous intraepithelial dysplasia)     Myocardiopathy 07/21/2017    Prediabetes     Proteinuria     Renal disorder     Thyroid nodule     Urate nephropathy      Past Surgical History:   Procedure Laterality Date    BREAST CYST EXCISION      COLONOSCOPY N/A 11/12/2015    COLONOSCOPY N/A 3/12/2021    Procedure: COLONOSCOPY;  Surgeon: Brendon Lanier MD;  Location: [facility];  Service: Endoscopy;  Laterality: N/A;  covid test 3/9, labs prior, prep instr mailed -ml    EXCISION OF ARTERIOVENOUS FISTULA Left 9/27/2023    Procedure: EXCISION, AV FISTULA;  Surgeon: FARIDEH Muñoz III, MD;  Location: [facility] OR [address] [place] [address];  Service: Vascular;  Laterality: Left;  LUE AV graft excision    INSERTION OF " BIVENTRICULAR IMPLANTABLE CARDIOVERTER-DEFIBRILLATOR (ICD)  04/2021    INSERTION OF TUNNELED CENTRAL VENOUS CATHETER (CVC) WITH SUBCUTANEOUS PORT N/A 1/25/2023    Procedure: INSERTION, DUAL LUMEN CATHETER WITH PORT, WITH IMAGING GUIDANCE;  Surgeon: FARIDEH Muñoz III, MD;  Location: [facility] OR [address] [place] [address];  Service: Peripheral Vascular;  Laterality: N/A;  possinble permcath placment    NEPHRECTOMY-LAPAROSCOPIC Left 01/12/2016    PERCUTANEOUS TRANSLUMINAL ANGIOPLASTY OF ARTERIOVENOUS FISTULA Left 4/19/2023    Procedure: PTA, AV FISTULA;  Surgeon: FARIDEH Muñoz III, MD;  Location: [facility] CATH LAB;  Service: Peripheral Vascular;  Laterality: Left;    PERITONEAL CATHETER INSERTION      Permacath insertion  01/12/2017    PLACEMENT OF ARTERIOVENOUS GRAFT  12/28/2022    Procedure: INSERTION, GRAFT, ARTERIOVENOUS;  Surgeon: FARIDEH Muñoz III, MD;  [facility];  Service: Peripheral Vascular;;    REMOVAL, GRAFT, ARTERIOVENOUS, UPPER EXTREMITY Left 1/25/2023    Procedure: REMOVAL, GRAFT, ARTERIOVENOUS, UPPER EXTREMITY;  Surgeon: FARIDEH Muñoz III, MD;  [facility];  Service: Peripheral Vascular;  Laterality: Left;    REVISION OF ARTERIOVENOUS FISTULA Left 5/1/2023    Procedure: REVISION, AV FISTULA;  Surgeon: FARIDEH Muñoz III, MD;  [facility];  Service: Peripheral Vascular;  Laterality: Left;  LUE AVG revision    REVISION OF PROCEDURE INVOLVING ARTERIOVENOUS GRAFT Left 12/28/2022    Procedure: REVISION, PROCEDURE INVOLVING ARTERIOVENOUS GRAFT;  Surgeon: FARIDEH Muñoz III, MD;  [facility];  Service: Peripheral Vascular;  Laterality: Left;    REVISION OF PROCEDURE INVOLVING ARTERIOVENOUS GRAFT Left 7/21/2023    Procedure: LEFT ARM ARTERIOVENOUS GRAFT EXCISION  AND LIGATION;  Surgeon: Obi Werner MD;  Location: [facility];  Service: Vascular;  Laterality: Left;  Left AVG excision and revision with interposition    SALPINGOOPHORECTOMY Right 2016     GALLITO---DAVINCSHIRLENE    TONSILLECTOMY      TUBAL LIGATION       Review of patient's allergies indicates:   Allergen Reactions    Coreg [carvedilol] Other (See Comments)     Nausea/vomiting    Allopurinol      Other reaction(s): abnormal transaminases     Medications:  Continuous Infusions:  Scheduled Meds:   apixaban  2.5 mg Oral BID    lanthanum  1,000 mg Oral TID WM    levothyroxine  75 mcg Oral Before breakfast    metoprolol succinate  50 mg Oral Daily    piperacillin-tazobactam (Zosyn) IV (PEDS and ADULTS) (extended infusion is not appropriate)  4.5 g Intravenous Q12H    sacubitriL-valsartan  2 tablet Oral BID    sodium chloride 0.9%  10 mL Intravenous Q8H    vitamin renal formula (B-complex-vitamin c-folic acid)  1 capsule Oral Daily     PRN Meds:acetaminophen, dextrose 10%, dextrose 10%, glucagon (human recombinant), glucose, glucose, HYDROcodone-acetaminophen, loperamide, melatonin, naloxone, ondansetron, prochlorperazine, Pharmacy to dose Vancomycin consult **AND** vancomycin - pharmacy to dose    Family History       Problem Relation (Age of Onset)    Cataracts Maternal Aunt    Diabetes Father, Maternal Grandfather    Heart disease Sister    Hypertension Mother    Kidney disease Father    No Known Problems Sister, Sister, Brother, Brother, Maternal Uncle, Paternal Aunt, Paternal Uncle, Maternal Grandmother, Paternal Grandmother, Paternal Grandfather, Son, Son, Other          Tobacco Use    Smoking status: Never     Passive exposure: Never    Smokeless tobacco: Never   Substance and Sexual Activity    Alcohol use: No    Drug use: Yes     Types: Hydrocodone    Sexual activity: Not Currently     Review of Systems   Unable to perform ROS: Mental status change     Objective:     Vital Signs (Most Recent):  Temp: 96.3 °F (35.7 °C) (10/11/23 1300)  Pulse: 81 (10/11/23 1300)  Resp: 19 (10/11/23 1300)  BP: (!) 124/96 (10/11/23 1300)  SpO2: 100 % (10/11/23 1200) Vital Signs (24h Range):  Temp:  [94.2 °F (34.6 °C)-98.1 °F  (36.7 °C)] 96.3 °F (35.7 °C)  Pulse:  [75-92] 81  Resp:  [15-33] 19  SpO2:  [98 %-100 %] 100 %  BP: (103-142)/(57-96) 124/96     Weight: 40.9 kg (90 lb 2.7 oz)  Body mass index is 15.48 kg/m².       Physical Exam  Vitals and nursing note reviewed.   Constitutional:       Appearance: She is cachectic.      Comments: Frail, appears older than age, appears very weak    HENT:      Head: Atraumatic.      Comments: Bilateral temporal wasting   Pulmonary:      Effort: No respiratory distress.   Musculoskeletal:      Comments: Muscular atrophy to extremities    Neurological:      Mental Status: She is oriented to person, place, and time and easily aroused. She is lethargic.      Comments: Did not participate in discussions during visit; laying in bed with eyes closed most of visit other than orientation questions        Advance Care Planning  Advance Directives:   Living Will: No    LaPOST: No    Do Not Resuscitate Status: No    Medical Power of : No ( Otis is legal surrogate decision maker)    Goals of Care: What is most important right now is to focus on symptom/pain control, extending life as long as possible, even it it means sacrificing quality, curative/life-prolongation (regardless of treatment burdens). Accordingly, we have decided that the best plan to meet the patient's goals includes continuing with treatment.     Significant Labs: All pertinent labs within the past 24 hours have been reviewed.  CBC:   Recent Labs   Lab 10/11/23  0457   WBC 5.04   HGB 9.2*   HCT 30.7*   *        BMP:  Recent Labs   Lab 10/11/23  0457   GLU 70      K 3.6      CO2 18*   BUN 26*   CREATININE 3.6*   CALCIUM 8.6*   MG 2.2     LFT:  Lab Results   Component Value Date    AST 91 (H) 10/11/2023     (H) 10/12/2006    ALKPHOS 373 (H) 10/11/2023    BILITOT 1.0 10/11/2023     Albumin:   Albumin   Date Value Ref Range Status   10/11/2023 2.3 (L) 3.5 - 5.2 g/dL Final     Protein:   Total Protein    Date Value Ref Range Status   10/11/2023 5.6 (L) 6.0 - 8.4 g/dL Final     Lactic acid:   Lab Results   Component Value Date    LACTATE 4.2 (HH) 10/11/2023    LACTATE 4.5 (HH) 10/10/2023     Significant Imaging: I have reviewed all pertinent imaging results/findings within the past 24 hours.        I spent a total of 70 minutes on the day of the visit. This includes face to face time in discussion of goals of care, symptom assessment, coordination of care and emotional support.  This also includes non-face to face time preparing to see the patient (eg, review of tests/imaging), obtaining and/or reviewing separately obtained history, documenting clinical information in the electronic or other health record, independently interpreting results and communicating results to the patient/family/caregiver, or care coordinator.    Leila Munoz NP  Palliative Medicine  [facility] [place] - Intensive Care

## 2023-10-11 NOTE — ASSESSMENT & PLAN NOTE
- follows up with oncology as outpatient with recent visit 9/2023  - on cabozantinib (immunotherapy) as outpatient

## 2023-10-11 NOTE — SUBJECTIVE & OBJECTIVE
Interval History: Temp normalized, off warming blanket, now hypothermic again. She has diarrhea but no other complaints. She did not eat much lunch.     Review of Systems   Constitutional:  Positive for fatigue. Negative for chills and fever.   Respiratory:  Negative for shortness of breath.    Cardiovascular:  Negative for chest pain.   Gastrointestinal:  Positive for diarrhea. Negative for abdominal pain, nausea and vomiting.   Musculoskeletal:  Negative for arthralgias and myalgias.   Skin:  Positive for wound.   Psychiatric/Behavioral:  Negative for confusion.      Objective:     Vital Signs (Most Recent):  Temp: (!) 94.2 °F (34.6 °C) (10/11/23 1200)  Pulse: 78 (10/11/23 1200)  Resp: (!) 21 (10/11/23 1200)  BP: 121/82 (10/11/23 1200)  SpO2: 100 % (10/11/23 1200) Vital Signs (24h Range):  Temp:  [94.2 °F (34.6 °C)-98.1 °F (36.7 °C)] 94.2 °F (34.6 °C)  Pulse:  [71-92] 78  Resp:  [15-33] 21  SpO2:  [98 %-100 %] 100 %  BP: (103-142)/(57-96) 121/82     Weight: 40.9 kg (90 lb 2.7 oz)  Body mass index is 15.48 kg/m².    Intake/Output Summary (Last 24 hours) at 10/11/2023 1339  Last data filed at 10/11/2023 1143  Gross per 24 hour   Intake 619.44 ml   Output --   Net 619.44 ml         Physical Exam  Vitals and nursing note reviewed.   Constitutional:       Appearance: She is ill-appearing. She is not toxic-appearing.      Comments: Sleepy but awakens easily. cachexia   HENT:      Head: Normocephalic and atraumatic.      Nose: Nose normal.      Mouth/Throat:      Mouth: Mucous membranes are moist.   Cardiovascular:      Rate and Rhythm: Normal rate and regular rhythm.   Pulmonary:      Effort: Pulmonary effort is normal.      Breath sounds: Normal breath sounds.      Comments: Room air  Abdominal:      General: Bowel sounds are normal.      Palpations: Abdomen is soft.      Tenderness: There is no abdominal tenderness.   Musculoskeletal:      Right lower leg: No edema.      Left lower leg: No edema.   Skin:     General:  Skin is warm and dry.      Comments: L AVG site bandaged. L 3rd toe bandaged. L THDC in place   Neurological:      Mental Status: She is oriented to person, place, and time.             Significant Labs: All pertinent labs within the past 24 hours have been reviewed.    Significant Imaging: I have reviewed all pertinent imaging results/findings within the past 24 hours.

## 2023-10-11 NOTE — PROGRESS NOTES
[facility] Medicine  Progress Note    Patient Name: Eva Bloom  MRN: [id number]  Patient Class: IP- Inpatient   Admission Date: 10/10/2023  Length of Stay: 1 days  Attending Physician: Cora Costa MD  Primary Care Provider: Sowmya Mccain MD        Subjective:     Principal Problem:Severe sepsis        HPI:  Ms Eva Bloom is a 58 y.o. woman with ESRD on HD TTS via THDC, systolic/diastolic CHF, PAD with toe wounds who presents for weight loss.  reports weight loss over last few weeks. Her appetite is poor but stable. No nausea, vomiting, dysphagia, abdominal pain. Reports diarrhea liquid stools 3-4x/day over last week. Does not make urine. She is s/p recent L AVG excision, site is healing well with no drainage or erythema. S/p L THDC placement, also working well with no issues. She does have L 3rd toe ulceration present, following with Podiatry. Was prescribed doxycycline after last appointment but never started it. Does have RCC following with Oncology on immunotherapy.     In ED, noted hypothermic and drowsy. Lactic elevated. Started warming blanket and broad antibiotics. Admitted to ICU.       Overview/Hospital Course:  Ms Eva Bloom was admitted to ICU with hypothermia, diarrhea, and lactic acidosis concerning for infection. Blood cultures NGTD. She does have L 3rd toe wound but does not appear infected. Does not make urine. L AVG excision site no infection. Cdiff negative, other stool studies are pending. Started broad antibiotics while awaiting further culture data. Nephrology consulted to continue dialysis.       Interval History: Temp normalized, off warming blanket, now hypothermic again. She has diarrhea but no other complaints. She did not eat much lunch.     Review of Systems   Constitutional:  Positive for fatigue. Negative for chills and fever.   Respiratory:  Negative for shortness of breath.    Cardiovascular:  Negative for chest pain.    Gastrointestinal:  Positive for diarrhea. Negative for abdominal pain, nausea and vomiting.   Musculoskeletal:  Negative for arthralgias and myalgias.   Skin:  Positive for wound.   Psychiatric/Behavioral:  Negative for confusion.      Objective:     Vital Signs (Most Recent):  Temp: (!) 94.2 °F (34.6 °C) (10/11/23 1200)  Pulse: 78 (10/11/23 1200)  Resp: (!) 21 (10/11/23 1200)  BP: 121/82 (10/11/23 1200)  SpO2: 100 % (10/11/23 1200) Vital Signs (24h Range):  Temp:  [94.2 °F (34.6 °C)-98.1 °F (36.7 °C)] 94.2 °F (34.6 °C)  Pulse:  [71-92] 78  Resp:  [15-33] 21  SpO2:  [98 %-100 %] 100 %  BP: (103-142)/(57-96) 121/82     Weight: 40.9 kg (90 lb 2.7 oz)  Body mass index is 15.48 kg/m².    Intake/Output Summary (Last 24 hours) at 10/11/2023 1339  Last data filed at 10/11/2023 1143  Gross per 24 hour   Intake 619.44 ml   Output --   Net 619.44 ml         Physical Exam  Vitals and nursing note reviewed.   Constitutional:       Appearance: She is ill-appearing. She is not toxic-appearing.      Comments: Sleepy but awakens easily. cachexia   HENT:      Head: Normocephalic and atraumatic.      Nose: Nose normal.      Mouth/Throat:      Mouth: Mucous membranes are moist.   Cardiovascular:      Rate and Rhythm: Normal rate and regular rhythm.   Pulmonary:      Effort: Pulmonary effort is normal.      Breath sounds: Normal breath sounds.      Comments: Room air  Abdominal:      General: Bowel sounds are normal.      Palpations: Abdomen is soft.      Tenderness: There is no abdominal tenderness.   Musculoskeletal:      Right lower leg: No edema.      Left lower leg: No edema.   Skin:     General: Skin is warm and dry.      Comments: L AVG site bandaged. L 3rd toe bandaged. L THDC in place   Neurological:      Mental Status: She is oriented to person, place, and time.             Significant Labs: All pertinent labs within the past 24 hours have been reviewed.    Significant Imaging: I have reviewed all pertinent imaging  results/findings within the past 24 hours.      Assessment/Plan:      * Severe sepsis  This patient does have evidence of infective focus. My overall impression is severe sepsis.  Source: Skin and Soft Tissue (location feet) and Unknown vs potential bacteremia   Antibiotics given-   Antibiotics (72h ago, onward)    Start     Stop Route Frequency Ordered    10/11/23 0100  piperacillin-tazobactam (ZOSYN) 4.5 g in dextrose 5 % in water (D5W) 100 mL IVPB (MB+)         -- IV Every 12 hours (non-standard times) 10/10/23 1556    10/10/23 1345  vancomycin - pharmacy to dose  (vancomycin IVPB (PEDS and ADULTS))        See [facility] for full Linked Orders Report.    -- IV pharmacy to manage frequency 10/10/23 1245        Latest lactate reviewed-  Recent Labs   Lab 10/10/23  1232 10/10/23  1610 10/11/23  0904   LACTATE 5.3* 4.5* 4.2*     Organ dysfunction indicated by Encephalopathy    Fluid challenge Contraindicated- Fluid bolus is contraindicated in this patient due to End Stage Renal Disease- given 500cc bolus   Post- resuscitation assessment Yes Perfusion exam was performed within 6 hours of septic shock presentation after bolus shows Adequate tissue perfusion assessed by non-invasive monitoring     - check blood cultures- NGTD  - does not make urine  - Podiatry to evaluate foot wounds- appears stable, no signs of cellulitis  - CT chest/abd/pelvis- no new source of infection noted  - repeat lactic downtrending but not normalized yet  - warming blanket for hypothermia as needed    Weight loss  Wt Readings from Last 3 Encounters:   10/10/23 2029 40.9 kg (90 lb 2.7 oz)   10/10/23 2028 40.9 kg (90 lb 2.7 oz)   10/10/23 1725 36.7 kg (81 lb)   10/10/23 1057 38 kg (83 lb 12.4 oz)   10/02/23 0726 40.8 kg (90 lb)   09/27/23 1228 40.8 kg (90 lb)     Body mass index is 15.48 kg/m².    - etiology unclear  - nutrition consult      Body mass index (BMI) less than 19  See weight loss      Chronic combined systolic and diastolic heart  failure  Results for orders placed during the hospital encounter of 05/08/23    Echo Saline Bubble? No    Interpretation Summary  · The left ventricle is normal in size with mild eccentric hypertrophy and severely decreased systolic function.  · The estimated ejection fraction is 23%.  · There is severe left ventricular global hypokinesis.  · Grade II left ventricular diastolic dysfunction.  · Moderate left atrial enlargement.  · Normal right ventricular size with normal right ventricular systolic function.  · Moderate right atrial enlargement.  · There is mild aortic valve stenosis.  · Aortic valve area is 1.58 cm2; peak velocity is 2.01 m/s; mean gradient is 8 mmHg.  · Moderate mitral regurgitation.  · Moderate to severe tricuspid regurgitation.  · Mild pulmonic regurgitation.  · Elevated central venous pressure (15 mmHg).  · The estimated PA systolic pressure is 47 mmHg.  · There is mild-moderate pulmonary hypertension.  · Moderate posterolateral pericardial effusion. Trivial under the RA    - euvolemic to hypovolemic on exam  - continue BB and entresto   BNP elevated   Recent Labs   Lab 10/10/23  1200   BNP >4,900*         Metastatic renal cell carcinoma to lung, unspecified laterality  Follows with Oncology, last seen 9/2023  On cabozantinib at home   - repeat CT to look for any progression in setting of worsening weight loss- no change  - side effects also include weight loss  - hold home Rx for now       Hypothyroidism  Lab Results   Component Value Date    TSH 14.474 (H) 10/10/2023   FT4 normal  TSH is improved from prior   - continue levothyroxine  - needs TSH rechecked outside of acute illness setting         PAF (paroxysmal atrial fibrillation)  Patient has Paroxysmal (<7 days) atrial fibrillation which is controlled. Patient is currently in sinus rhythm.    Rate control: BB  Anticoagulation: eliquis    ICD (implantable cardioverter-defibrillator) in place - SubQ  Noted. No acute issues       Anemia in  ESRD (end-stage renal disease)  Macrocytic anemia present on admit. No signs of bleeding.   - EPO with HD  - B12, folate normal when last checked. TSAT appropriate  - CBC in AM to monitor       ESRD (end stage renal disease) on dialysis  On TTS HD via THDC  - Nephrology consulted  - note: s/p AVG excision 9/28/23 for infection. Check blood cultures - NGTD  - wound care consult for LUE site      Essential hypertension  BP is currently normal.  Home Rx= clonidine patch, entresto, BB  - continue entresto, BB  - clonidine can cause drowsiness- may be contributory      Hyperparathyroidism, secondary renal  Lab Results   Component Value Date    PTH >2,500.0 (H) 02/17/2023    CALCIUM 8.6 (L) 10/11/2023    PHOS 4.9 (H) 10/11/2023     On lanthanum as outpatient- continue       VTE Risk Mitigation (From admission, onward)         Ordered     apixaban tablet 2.5 mg  2 times daily         10/10/23 1646     Place PING hose  Until discontinued         10/10/23 1422     Place sequential compression device  Until discontinued         10/10/23 1422     Reason for No Pharmacological VTE Prophylaxis  Once        Question:  Reasons:  Answer:  Already adequately anticoagulated on oral Anticoagulants    10/10/23 1422     IP VTE HIGH RISK PATIENT  Once         10/10/23 1422                Discharge Planning   MAVIS:      Code Status: Full Code   Is the patient medically ready for discharge?:     Reason for patient still in hospital (select all that apply): Patient trending condition               Critical care time spent on the evaluation and treatment of severe organ dysfunction, review of pertinent labs and imaging studies, discussions with consulting providers and discussions with patient/family: 40 minutes.      Cora Costa MD  Department of Hospital Medicine   [facility] - [place] - Intensive Care

## 2023-10-11 NOTE — ASSESSMENT & PLAN NOTE
- chronic history noted; EF 35% on most recent echo   - diastolic HF that does cause intermittent SOB at rest and affects exertion/ADLs at times per discussion with family on abilities prior to admit   - pt and  previously with fair insight into life-limiting condition trajectory   -  contributes to appropriateness for hospice if/when aligns with GOC

## 2023-10-11 NOTE — ASSESSMENT & PLAN NOTE
Results for orders placed during the hospital encounter of 05/08/23    Echo Saline Bubble? No    Interpretation Summary  · The left ventricle is normal in size with mild eccentric hypertrophy and severely decreased systolic function.  · The estimated ejection fraction is 23%.  · There is severe left ventricular global hypokinesis.  · Grade II left ventricular diastolic dysfunction.  · Moderate left atrial enlargement.  · Normal right ventricular size with normal right ventricular systolic function.  · Moderate right atrial enlargement.  · There is mild aortic valve stenosis.  · Aortic valve area is 1.58 cm2; peak velocity is 2.01 m/s; mean gradient is 8 mmHg.  · Moderate mitral regurgitation.  · Moderate to severe tricuspid regurgitation.  · Mild pulmonic regurgitation.  · Elevated central venous pressure (15 mmHg).  · The estimated PA systolic pressure is 47 mmHg.  · There is mild-moderate pulmonary hypertension.  · Moderate posterolateral pericardial effusion. Trivial under the RA    - euvolemic to hypovolemic on exam  - continue BB and entresto   BNP elevated   Recent Labs   Lab 10/10/23  1200   BNP >4,900*

## 2023-10-11 NOTE — PROGRESS NOTES
Vancomycin consult follow-up:    Patient reviewed, dialysis not scheduled currently but Kayla Kaufman, Sat at home, cultures reviewed, no new levels, give 750 mg if HD today; Next levels due: 10/12/2023 at 0300

## 2023-10-11 NOTE — ASSESSMENT & PLAN NOTE
- long term HD pt  - significant kidney disease in family with pt's son (in 30s) also on HD as discussed in previous admission  - per , no complications with HD recently and wish to cont HD as long as pt is a candidate   - noted; continued management per nephrology and hospital primary

## 2023-10-11 NOTE — CONSULTS
[facility] - Intensive Care  Adult Nutrition  Consult Note    SUMMARY     Recommendations    1. Consider appetite stimulant to help increase PO intake.    2. Continue to monitor PO intake of meals and snacks.    3. Continue to monitor weights and labs    4. Collaboration with medical providers  5. Consider Novasource Renal BID to help meet EEN/EPN    Goals: 1. Pt to meet % EEN/EPN by RD follow up  Nutrition Goal Status: new  Communication of RD Recs:  (POC)    Assessment and Plan    Nutrition Problem:  Moderate/Severe Protein-Calorie Malnutrition  Malnutrition in the context of Chronic Illness/Injury    Related to (etiology):  Diagnosis related symptoms    Signs and Symptoms (as evidenced by):  Energy Intake: <50% of estimated ergy requirement for > 1 year  Body Fat Depletion: severe depletion of orbitals, triceps, and thoracic and lumbar region   Muscle Mass Depletion: severe depletion of temples, clavicle region, scapular region, interosseous muscle, and lower extremities   Weight Loss: 17% x 1 year       Interventions(treatment strategy):  Collaboration with medical providers    Nutrition Diagnosis Status:  New     Reason for Assessment    Reason For Assessment: consult  Diagnosis: infection/sepsis  Relevant Medical History:   Past Medical History:   Diagnosis Date    Anemia     Anticoagulant long-term use     Atrial fibrillation     Bronchitis 03/01/2017    Cancer 2016    kidney cancer    CHF (congestive heart failure), NYHA class II, chronic, systolic     CMV (cytomegalovirus) antibody positive     Encounter for blood transfusion     ESRD (end stage renal disease)     Essential hypertension 09/23/2015    H/O herpes simplex type 2 infection     Herpes simplex type 1 antibody positive     History of kidney cancer     s/p left nephrectomy 1/2016    Hyperparathyroidism, unspecified     Hyperuricemia without signs of inflammatory arthritis and tophaceous disease     Kidney stones     LGSIL (low grade squamous  "intraepithelial dysplasia)     Myocardiopathy 07/21/2017    Prediabetes     Proteinuria     Renal disorder     Thyroid nodule     Urate nephropathy       Interdisciplinary Rounds: did not attend  General Information Comments: RD consult for impaired nutrition. Pt currently on renal diet with about 25% intake since admit. Reports poor appetite.  BMI 15.48, protein energy malnutrition grade lll. Denies N/V/D/C. Pt is catchetic  with toe wounds.  Nutrition Discharge Planning: renal diet    Nutrition Risk Screen    Nutrition Risk Screen: large or nonhealing wound, burn or pressure injury, reduced oral intake over the last month    Nutrition/Diet History    Food Allergies: NKFA    Anthropometrics    Temp: 96.3 °F (35.7 °C)  Height Method: Stated  Height: 5' 4" (162.6 cm)  Height (inches): 64 in  Weight Method: Bed Scale  Weight: 40.8 kg (90 lb)  Weight (lb): 90 lb  Ideal Body Weight (IBW), Female: 120 lb  % Ideal Body Weight, Female (lb): 75 %  BMI (Calculated): 15.4  BMI Grade: less than 16 protein-energy malnutrition grade III       Lab/Procedures/Meds    Pertinent Labs Reviewed: reviewed  BMP  Lab Results   Component Value Date     10/11/2023    K 3.6 10/11/2023     10/11/2023    CO2 18 (L) 10/11/2023    BUN 26 (H) 10/11/2023    CREATININE 3.6 (H) 10/11/2023    CALCIUM 8.6 (L) 10/11/2023    ANIONGAP 18 (H) 10/11/2023    EGFRNORACEVR 14 (A) 10/11/2023      Pertinent Medications Reviewed: reviewed  Current Outpatient Medications   Medication Instructions    acetaminophen (TYLENOL) 500 mg, Oral, Every 6 hours PRN    apixaban (ELIQUIS) 2.5 mg, Oral, 2 times daily    aspirin 81 mg, Oral, Daily    cabozantinib (CABOMETYX) 60 mg Tab TAKE ONE TABLET BY MOUTH ONCE DAILY AT THE SAME TIME ON AN EMPTY STOMACH AT LEAST 1 HOUR BEFORE OR 2 HOURS AFTER EATING. AVOID GRAPEFRUIT PRODUCTS    cloNIDine 0.3 mg/24 hr td ptwk (CATAPRES) 0.3 mg/24 hr 1 patch, Transdermal, Every 7 days    cyclobenzaprine (FLEXERIL) 5 mg, Oral    " ENTRESTO 49-51 mg per tablet 1 tablet, Oral, 2 times daily    epoetin david-epbx (RETACRIT INJ) Epoetin david - epbx (Retacrit)    fluticasone propionate (FLONASE) 50 mcg, Each Nostril, 2 times daily PRN    hydrALAZINE (APRESOLINE) 100 mg, Oral, Every 12 hours    HYDROcodone-acetaminophen (NORCO) 5-325 mg per tablet 1 tablet, Oral, Every 6 hours PRN    lanthanum (FOSRENOL) 1000 MG chewable tablet 1 tablet, Oral    levothyroxine (SYNTHROID) 75 mcg, Oral, Daily    lidocaine-prilocaine (EMLA) cream APPLY ATLEAST 30 MINUTES BEFORE TREATMENT 3 TIMES A WEEK    metoprolol succinate (TOPROL-XL) 100 MG 24 hr tablet Take 1/2 tablet (50mg) once daily    mv,Ca,min-folic acid-vit K1 (ONE-A-DAY WOMEN'S 50 PLUS) 400-20 mcg Tab 1 tablet, Oral, Daily    naloxone (NARCAN) 1 mg/mL injection 2 mg (1 mg per nostril) by Nasal route as needed for opioid overdose; may repeat in 3 to 5 minutes if not effective. Call 911           Estimated/Assessed Needs    Weight Used For Calorie Calculations: 54.4 kg (120 lb)  Energy Calorie Requirements (kcal): 1324 kcal  Energy Need Method: [place] (1.2 kcal x ibw)  Protein Requirements: 55 g  Weight Used For Protein Calculations: 54.4 kg (120 lb)     Estimated Fluid Requirement Method: RDA Method  RDA Method (mL): 1324         Nutrition Prescription Ordered    Current Diet Order: renal diet    Evaluation of Received Nutrient/Fluid Intake    I/O: +449 mL  Energy Calories Required: not meeting needs  Protein Required: not meeting needs  Fluid Required: not meeting needs  Comments: lbm 10/11/23  % Intake of Estimated Energy Needs: 0 - 25 %  % Meal Intake: 25 - 50 %    Nutrition Risk    Level of Risk/Frequency of Follow-up: high       Monitor and Evaluation    Food and Nutrient Intake: food and beverage intake  Food and Nutrient Adminstration: diet order  Knowledge/Beliefs/Attitudes: food and nutrition knowledge/skill, beliefs and attitudes  Physical Activity and Function: nutrition-related ADLs and  IADLs, factors affecting access to physical activity  Anthropometric Measurements: weight, weight change, body mass index  Biochemical Data, Medical Tests and Procedures: gastrointestinal profile  Nutrition-Focused Physical Findings: overall appearance       Nutrition Follow-Up    RD Follow-up?: Yes    Yue Valdes, Registration Eligible, Provisional LDN

## 2023-10-11 NOTE — ASSESSMENT & PLAN NOTE
BP is currently normal.  Home Rx= clonidine patch, entresto, BB  - continue entresto, BB  - clonidine can cause drowsiness- may be contributory

## 2023-10-11 NOTE — HOSPITAL COURSE
58 year old woman with ESRD (HD TTS), recent L AVG excision, RCC s/p nephrectomy, pAFIB, hypothyroidism, systolic/diastolic chf, PAD with toe wounds who presented for evaluation of unintentional weight loss.  She was admitted to inpatient status and diagnosed with SIRS, metastatic RCC to lung and pancreas, severe malnutrition.  Initially treated in [facility] with broad spectrum antibiotics but no definitive souce of infection identified.  ID consulted and completed IV antibiotics per ID.  Again with hypothermia and severe lethargy 10/15 which resolved with warming blanket.  Adrenal response is normal.  This appears to be SIRS due to advancing cancer and severe malnutrition.  She is now DNR and  is pursuing hospice vs home palliative care.  Setting up meeting for him with 2 different agencies.  He does wish to continue with dialysis.  If chooses hospice, will need to deactivate AICD prior to discharge.

## 2023-10-11 NOTE — PLAN OF CARE
Recommendations    1. Consider appetite stimulant to help increase PO intake.    2. Continue to monitor PO intake of meals and snacks.    3. Continue to monitor weights and labs    4. Collaboration with medical providers  5. Consider Novasource Renal BID to help meet EEN/EPN    Goals: 1. Pt to meet % EEN/EPN by RD follow up  Nutrition Goal Status: new  Communication of RD Recs:  (POC)    Assessment and Plan    Nutrition Problem:  Moderate/Severe Protein-Calorie Malnutrition  Malnutrition in the context of Chronic Illness/Injury    Related to (etiology):  Diagnosis related symptoms    Signs and Symptoms (as evidenced by):  Energy Intake: <50% of estimated ergy requirement for > 1 year  Body Fat Depletion: severe depletion of orbitals, triceps, and thoracic and lumbar region   Muscle Mass Depletion: severe depletion of temples, clavicle region, scapular region, interosseous muscle, and lower extremities   Weight Loss: 17% x 1 year       Interventions(treatment strategy):  Collaboration with medical providers    Nutrition Diagnosis Status:  New

## 2023-10-11 NOTE — ASSESSMENT & PLAN NOTE
- pt with severe protein calorie malnutrition with >12% body weight loss in a little over 2 months (8/2023: weight was 103 lbs and is now 90lbs);  reports weight has been even lower recently (in 80s)   - current BMI: 15.48; albumin 2.3  -  reports increased lack of appetite in the past few months, especially correlated with symptoms which lead to admit   - unclear if patient also experiencing nausea with eating, which could be deterrent to eating, or just overall lack of appetite; plan for further symptom discussion with pt when able/willing to participate   - expressed transparent concern for pt's nutritional status and ability to tolerate/qualify for current treatments such as HD and onc. Immunotherapy if she continues to have declines in weight and stamina   - contributes to qualification for hospice if/when aligns with GOC

## 2023-10-11 NOTE — ASSESSMENT & PLAN NOTE
- noted   - if pt were to ever have a change in GOC/code status would need to discuss deactivation at that time

## 2023-10-11 NOTE — PROGRESS NOTES
Date of Admission:10/10/2023    SUBJECTIVE:no appetite    Current Facility-Administered Medications   Medication    acetaminophen tablet 650 mg    apixaban tablet 2.5 mg    dextrose 10% bolus 125 mL 125 mL    dextrose 10% bolus 250 mL 250 mL    glucagon (human recombinant) injection 1 mg    glucose chewable tablet 16 g    glucose chewable tablet 24 g    HYDROcodone-acetaminophen 5-325 mg per tablet 1 tablet    lanthanum chewable tablet 1,000 mg    levothyroxine tablet 75 mcg    loperamide capsule 2 mg    melatonin tablet 6 mg    metoprolol succinate (TOPROL-XL) 24 hr tablet 50 mg    naloxone 0.4 mg/mL injection 0.02 mg    ondansetron injection 4 mg    piperacillin-tazobactam (ZOSYN) 4.5 g in dextrose 5 % in water (D5W) 100 mL IVPB (MB+)    prochlorperazine injection Soln 5 mg    sacubitriL-valsartan 24-26 mg per tablet 2 tablet    sodium chloride 0.9% flush 10 mL    vancomycin - pharmacy to dose    vitamin renal formula (B-complex-vitamin c-folic acid) 1 mg per capsule 1 capsule     Facility-Administered Medications Ordered in Other Encounters   Medication    0.9%  NaCl infusion       Wt Readings from Last 3 Encounters:   10/11/23 40.8 kg (90 lb)   10/02/23 40.8 kg (90 lb)   09/27/23 40.8 kg (90 lb)     Temp Readings from Last 3 Encounters:   10/11/23 96.1 °F (35.6 °C) (Axillary)   09/29/23 97.3 °F (36.3 °C) (Oral)   09/22/23 97.8 °F (36.6 °C) (Oral)     BP Readings from Last 3 Encounters:   10/11/23 123/89   09/29/23 108/70   09/22/23 120/83     Pulse Readings from Last 3 Encounters:   10/11/23 83   09/29/23 73   09/22/23 68       Intake/Output Summary (Last 24 hours) at 10/11/2023 1724  Last data filed at 10/11/2023 1143  Gross per 24 hour   Intake 269.44 ml   Output --   Net 269.44 ml       PE:  GEN:wd female in nad  HEENT:ncat,eomi,mmm  CVS:s1s2 regular  PULM:ctab  ABD:bs,soft  EXT:no leg edema  NEURO:awake  pc    Recent Labs   Lab 10/11/23  0457   GLU 70      K 3.6      CO2 18*   BUN 26*    CREATININE 3.6*   CALCIUM 8.6*   MG 2.2       Lab Results   Component Value Date    PTH >2,500.0 (H) 02/17/2023    CALCIUM 8.6 (L) 10/11/2023    PHOS 4.9 (H) 10/11/2023       Recent Labs   Lab 10/11/23  0457   WBC 5.04   RBC 2.74*   HGB 9.2*   HCT 30.7*      *   MCH 33.6*   MCHC 30.0*           A/P:  1.esrd. hd in am. Cont tx tts. [facility] WB.  2.anemia 2nd to esrd. No prbc indicated. Epo.  3.2nd hyperpara. Phos at goal.  4.lactic acidosis. Cont to follow. Suspect pt is dwindling down with her rcc. Mets?  5.cachexia. try marinol.  6.rcc. tx per onc. Brain mets?  7.htn. sbp ok.

## 2023-10-11 NOTE — ASSESSMENT & PLAN NOTE
This patient does have evidence of infective focus. My overall impression is severe sepsis.  Source: Skin and Soft Tissue (location feet) and Unknown vs potential bacteremia   Antibiotics given-   Antibiotics (72h ago, onward)    Start     Stop Route Frequency Ordered    10/11/23 0100  piperacillin-tazobactam (ZOSYN) 4.5 g in dextrose 5 % in water (D5W) 100 mL IVPB (MB+)         -- IV Every 12 hours (non-standard times) 10/10/23 1556    10/10/23 1345  vancomycin - pharmacy to dose  (vancomycin IVPB (PEDS and ADULTS))        See Jael for full Linked Orders Report.    -- IV pharmacy to manage frequency 10/10/23 1245        Latest lactate reviewed-  Recent Labs   Lab 10/10/23  1232 10/10/23  1610 10/11/23  0904   LACTATE 5.3* 4.5* 4.2*     Organ dysfunction indicated by Encephalopathy    Fluid challenge Contraindicated- Fluid bolus is contraindicated in this patient due to End Stage Renal Disease- given 500cc bolus   Post- resuscitation assessment Yes Perfusion exam was performed within 6 hours of septic shock presentation after bolus shows Adequate tissue perfusion assessed by non-invasive monitoring     - check blood cultures- NGTD  - does not make urine  - Podiatry to evaluate foot wounds- appears stable, no signs of cellulitis  - CT chest/abd/pelvis- no new source of infection noted  - repeat lactic downtrending but not normalized yet  - warming blanket for hypothermia as needed

## 2023-10-11 NOTE — CARE UPDATE
[facility]ensive [facility]  ICU Shift Summary  Date: 10/11/2023      Prehospitalization: Home  Admit Date / LOS : 10/10/2023/ 1 days    Diagnosis: Severe sepsis    Consults:        Active: Palliative, SW, and Wound Care & PODIATRY       Needed: N/A     Code Status: Full Code   Advanced Directive: <no information>    LDA:  Lines/Drains/Airways       Central Venous Catheter Line  Duration                  Hemodialysis Catheter left subclavian -- days         Hemodialysis AV Graft 04/19/23 1057 Left upper arm 175 days              Peripheral Intravenous Line  Duration                  Peripheral IV - Single Lumen 10/10/23 1351 20 G;1 3/4 in Anterior;Right Upper Arm 1 day                  Central Lines/Site/Justification:Patient Does Not Have Central Line  Urinary Cath/Order/Justification:Patient Does Not Have Urinary Catheter    Vasopressors/Infusions:        GOALS: Volume/ Hemodynamic: MAP > 65                     RASS: 0  alert and calm    Pain Management: none       Pain Controlled: yes     Rhythm: NSR    Respiratory Device: Room Air                      Most Recent SBT/ SAT: N/A       MOVE Screen: PASS  ICU Liberation: yes    VTE Prophylaxis: Pharm and Mechanical  Mobility: OOB to Chair and A: Assist  Stress Ulcer Prophylaxis: No    Isolation: No active isolations    Dietary:   Current Diet Order   Procedures    Diet renal      Tolerance: yes  Advancement: yes    I & O (24h):    Intake/Output Summary (Last 24 hours) at 10/11/2023 1848  Last data filed at 10/11/2023 1143  Gross per 24 hour   Intake 269.44 ml   Output --   Net 269.44 ml        Restraints: No    Significant Dates:  Post Op Date: N/A  Rescue Date: N/A  Imaging/ Diagnostics: N/A    Noteworthy Labs:  none    COVID Test: (--)  CBC/Anemia Labs: Coags:    Recent Labs   Lab 10/10/23  1200 10/10/23  1342 10/11/23  0457   WBC 5.60  --  5.04   HGB 10.7*  --  9.2*   HCT 36.0* 32* 30.7*     --  153   *  --  112*   RDW 22.4*  --  22.4*    No results  "for input(s): "PT", "INR", "APTT" in the last 168 hours.     Chemistries:   Recent Labs   Lab 10/10/23  1320 10/11/23  0457    138   K 3.7 3.6    102   CO2 18* 18*   BUN 19 26*   CREATININE 3.0* 3.6*   CALCIUM 8.0* 8.6*   PROT 5.7* 5.6*   BILITOT 1.0 1.0   ALKPHOS 403* 373*   ALT 37 38   * 91*   MG 2.1 2.2   PHOS 3.9 4.9*        Cardiac Enzymes: Ejection Fractions:    Recent Labs     10/10/23  1200   TROPONINI 0.421*    EF   Date Value Ref Range Status   05/08/2023 23 % Final        POCT Glucose: HbA1c:    Recent Labs   Lab 10/10/23  1441   POCTGLUCOSE 96    Hemoglobin A1C   Date Value Ref Range Status   08/25/2023 4.1 4.0 - 5.6 % Final     Comment:     ADA Screening Guidelines:  5.7-6.4%  Consistent with prediabetes  >or=6.5%  Consistent with diabetes    High levels of fetal hemoglobin interfere with the HbA1C  assay. Heterozygous hemoglobin variants (HbS, HgC, etc)do  not significantly interfere with this assay.   However, presence of multiple variants may affect accuracy.             ICU LOS 1d 1h  Level of Care: OK to Transfer    Chart Check: 12 HR Done  Shift Summary/Plan for the shift: Loperamide added. Multiple Bms today   "

## 2023-10-11 NOTE — PLAN OF CARE
Case Management Assessment     PCP: Sowmya Mccain  Pharmacy: [facility] ([facility]      Patient Arrived From: home  Existing Help at Home: spouse, Otis    Barriers to Discharge: none    Discharge Plan:    A. [facility]   B. tbd        This patient has been screened for Case Management needs.  Treatment is ongoing in the ICU at this time.     Patient stated that she does not have a living will or advance directive.  Patient's choice of someone to speak on her behalf if unable:  her spouse, Otis Walker.  She declined Advanced directive packet.    CM provided patient with contact info and encouraged her to call for any discharge needs.  CM will continue to follow while in the ICU and assist with DC planning as needed.         10/11/23 6044   Discharge Assessment   Assessment Type Discharge Planning Assessment   Confirmed/corrected address, phone number and insurance Yes   Confirmed Demographics Correct on Facesheet   Source of Information patient   Communicated MAVIS with patient/caregiver Yes   People in Home spouse   Do you expect to return to your current living situation? Yes   Do you have help at home or someone to help you manage your care at home? Yes   Prior to hospitilization cognitive status: Alert/Oriented   Current cognitive status: Alert/Oriented   Walking or Climbing Stairs ambulation difficulty, requires equipment   Dressing/Bathing bathing difficulty, assistance 1 person   Home Layout Able to live on 1st floor   Equipment Currently Used at Home walker, rolling;bedside commode;shower chair;wheelchair   Readmission within 30 days? Yes   Patient currently being followed by outpatient case management? No   Do you currently have service(s) that help you manage your care at home? Yes   Name and Contact number of agency [facility]   Is the pt/caregiver preference to resume services with current agency Yes   Do you take prescription medications? Yes   Do you have prescription coverage? Yes   Coverage  Medicare   Do you have any problems affording any of your prescribed medications? No   Is the patient taking medications as prescribed? yes   Who is going to help you get home at discharge? spouse, Otis   How do you get to doctors appointments? family or friend will provide   Are you on dialysis? Yes   Dialysis Name and Scheduled days [facility]   Do you take coumadin? No   DME Needed Upon Discharge  none   Discharge Plan discussed with: Patient   Transition of Care Barriers None   Discharge Plan A Home Health   Discharge Plan B   (tbd)   OTHER   Name(s) of People in Home spouseOtis

## 2023-10-11 NOTE — SUBJECTIVE & OBJECTIVE
Past Medical History:   Diagnosis Date    Anemia     Anticoagulant long-term use     Atrial fibrillation     Bronchitis 03/01/2017    Cancer 2016    kidney cancer    CHF (congestive heart failure), NYHA class II, chronic, systolic     CMV (cytomegalovirus) antibody positive     Encounter for blood transfusion     ESRD (end stage renal disease)     Essential hypertension 09/23/2015    H/O herpes simplex type 2 infection     Herpes simplex type 1 antibody positive     History of kidney cancer     s/p left nephrectomy 1/2016    Hyperparathyroidism, unspecified     Hyperuricemia without signs of inflammatory arthritis and tophaceous disease     Kidney stones     LGSIL (low grade squamous intraepithelial dysplasia)     Myocardiopathy 07/21/2017    Prediabetes     Proteinuria     Renal disorder     Thyroid nodule     Urate nephropathy      Past Surgical History:   Procedure Laterality Date    BREAST CYST EXCISION      COLONOSCOPY N/A 11/12/2015    COLONOSCOPY N/A 3/12/2021    Procedure: COLONOSCOPY;  Surgeon: Brendon Lanier MD;  Location: [facility];  Service: Endoscopy;  Laterality: N/A;  covid test 3/9, labs prior, prep instr mailed -ml    EXCISION OF ARTERIOVENOUS FISTULA Left 9/27/2023    Procedure: EXCISION, AV FISTULA;  Surgeon: FARIDEH Muñoz III, MD;  Location: [facility] OR [address] [place] [address];  Service: Vascular;  Laterality: Left;  LUE AV graft excision    INSERTION OF BIVENTRICULAR IMPLANTABLE CARDIOVERTER-DEFIBRILLATOR (ICD)  04/2021    INSERTION OF TUNNELED CENTRAL VENOUS CATHETER ([facility]) WITH SUBCUTANEOUS PORT N/A 1/25/2023    Procedure: INSERTION, DUAL LUMEN CATHETER WITH PORT, WITH IMAGING GUIDANCE;  Surgeon: FARIDEH Muñoz III, MD;  [facility];  Service: Peripheral Vascular;  Laterality: N/A;  possinble permcath placment    NEPHRECTOMY-LAPAROSCOPIC Left 01/12/2016    PERCUTANEOUS TRANSLUMINAL ANGIOPLASTY OF ARTERIOVENOUS FISTULA Left 4/19/2023    Procedure: PTA, AV FISTULA;  Surgeon: FARIDEH Muñoz  III, MD;  Location: [facility] CATH LAB;  Service: Peripheral Vascular;  Laterality: Left;    PERITONEAL CATHETER INSERTION      Permacath insertion  01/12/2017    PLACEMENT OF ARTERIOVENOUS GRAFT  12/28/2022    Procedure: INSERTION, GRAFT, ARTERIOVENOUS;  Surgeon: FARIDEH Muñoz III, MD;  [facility];  Service: Peripheral Vascular;;    REMOVAL, GRAFT, ARTERIOVENOUS, UPPER EXTREMITY Left 1/25/2023    Procedure: REMOVAL, GRAFT, ARTERIOVENOUS, UPPER EXTREMITY;  Surgeon: FARIDEH Muñoz III, MD;  [facility];  Service: Peripheral Vascular;  Laterality: Left;    REVISION OF ARTERIOVENOUS FISTULA Left 5/1/2023    Procedure: REVISION, AV FISTULA;  Surgeon: FARIDEH Muñoz III, MD;  [facility];  Service: Peripheral Vascular;  Laterality: Left;  LUE AVG revision    REVISION OF PROCEDURE INVOLVING ARTERIOVENOUS GRAFT Left 12/28/2022    Procedure: REVISION, PROCEDURE INVOLVING ARTERIOVENOUS GRAFT;  Surgeon: FARIDEH Muñoz III, MD;  [facility];  Service: Peripheral Vascular;  Laterality: Left;    REVISION OF PROCEDURE INVOLVING ARTERIOVENOUS GRAFT Left 7/21/2023    Procedure: LEFT ARM ARTERIOVENOUS GRAFT EXCISION  AND LIGATION;  Surgeon: Obi Werner MD;  Location: [facility] OR;  Service: Vascular;  Laterality: Left;  Left AVG excision and revision with interposition    SALPINGOOPHORECTOMY Right 2016    KJB---DAVINCI    TONSILLECTOMY      TUBAL LIGATION       Review of patient's allergies indicates:   Allergen Reactions    Coreg [carvedilol] Other (See Comments)     Nausea/vomiting    Allopurinol      Other reaction(s): abnormal transaminases     Medications:  Continuous Infusions:  Scheduled Meds:   apixaban  2.5 mg Oral BID    lanthanum  1,000 mg Oral TID WM    levothyroxine  75 mcg Oral Before breakfast    metoprolol succinate  50 mg Oral Daily    piperacillin-tazobactam (Zosyn) IV (PEDS and ADULTS) (extended infusion is not appropriate)  4.5 g Intravenous Q12H     sacubitriL-valsartan  2 tablet Oral BID    sodium chloride 0.9%  10 mL Intravenous Q8H    vitamin renal formula (B-complex-vitamin c-folic acid)  1 capsule Oral Daily     PRN Meds:acetaminophen, dextrose 10%, dextrose 10%, glucagon (human recombinant), glucose, glucose, HYDROcodone-acetaminophen, loperamide, melatonin, naloxone, ondansetron, prochlorperazine, Pharmacy to dose Vancomycin consult **AND** vancomycin - pharmacy to dose    Family History       Problem Relation (Age of Onset)    Cataracts Maternal Aunt    Diabetes Father, Maternal Grandfather    Heart disease Sister    Hypertension Mother    Kidney disease Father    No Known Problems Sister, Sister, Brother, Brother, Maternal Uncle, Paternal Aunt, Paternal Uncle, Maternal Grandmother, Paternal Grandmother, Paternal Grandfather, Son, Son, Other          Tobacco Use    Smoking status: Never     Passive exposure: Never    Smokeless tobacco: Never   Substance and Sexual Activity    Alcohol use: No    Drug use: Yes     Types: Hydrocodone    Sexual activity: Not Currently     Review of Systems   Unable to perform ROS: Mental status change     Objective:     Vital Signs (Most Recent):  Temp: 96.3 °F (35.7 °C) (10/11/23 1300)  Pulse: 81 (10/11/23 1300)  Resp: 19 (10/11/23 1300)  BP: (!) 124/96 (10/11/23 1300)  SpO2: 100 % (10/11/23 1200) Vital Signs (24h Range):  Temp:  [94.2 °F (34.6 °C)-98.1 °F (36.7 °C)] 96.3 °F (35.7 °C)  Pulse:  [75-92] 81  Resp:  [15-33] 19  SpO2:  [98 %-100 %] 100 %  BP: (103-142)/(57-96) 124/96     Weight: 40.9 kg (90 lb 2.7 oz)  Body mass index is 15.48 kg/m².       Physical Exam  Vitals and nursing note reviewed.   Constitutional:       Appearance: She is cachectic.      Comments: Frail, appears older than age, appears very weak    HENT:      Head: Atraumatic.      Comments: Bilateral temporal wasting   Pulmonary:      Effort: No respiratory distress.   Musculoskeletal:      Comments: Muscular atrophy to extremities    Neurological:       Mental Status: She is oriented to person, place, and time and easily aroused. She is lethargic.      Comments: Did not participate in discussions during visit; laying in bed with eyes closed most of visit other than orientation questions        Advance Care Planning   Advance Directives:   Living Will: No    LaPOST: No    Do Not Resuscitate Status: No    Medical Power of : No ( Otis is legal surrogate decision maker)    Goals of Care: What is most important right now is to focus on symptom/pain control, extending life as long as possible, even it it means sacrificing quality, curative/life-prolongation (regardless of treatment burdens). Accordingly, we have decided that the best plan to meet the patient's goals includes continuing with treatment.     Significant Labs: All pertinent labs within the past 24 hours have been reviewed.  CBC:   Recent Labs   Lab 10/11/23  0457   WBC 5.04   HGB 9.2*   HCT 30.7*   *        BMP:  Recent Labs   Lab 10/11/23  0457   GLU 70      K 3.6      CO2 18*   BUN 26*   CREATININE 3.6*   CALCIUM 8.6*   MG 2.2     LFT:  Lab Results   Component Value Date    AST 91 (H) 10/11/2023     (H) 10/12/2006    ALKPHOS 373 (H) 10/11/2023    BILITOT 1.0 10/11/2023     Albumin:   Albumin   Date Value Ref Range Status   10/11/2023 2.3 (L) 3.5 - 5.2 g/dL Final     Protein:   Total Protein   Date Value Ref Range Status   10/11/2023 5.6 (L) 6.0 - 8.4 g/dL Final     Lactic acid:   Lab Results   Component Value Date    LACTATE 4.2 (HH) 10/11/2023    LACTATE 4.5 (HH) 10/10/2023     Significant Imaging: I have reviewed all pertinent imaging results/findings within the past 24 hours.

## 2023-10-11 NOTE — ASSESSMENT & PLAN NOTE
Wt Readings from Last 3 Encounters:   10/10/23 2029 40.9 kg (90 lb 2.7 oz)   10/10/23 2028 40.9 kg (90 lb 2.7 oz)   10/10/23 1725 36.7 kg (81 lb)   10/10/23 1057 38 kg (83 lb 12.4 oz)   10/02/23 0726 40.8 kg (90 lb)   09/27/23 1228 40.8 kg (90 lb)     Body mass index is 15.48 kg/m².    - etiology unclear  - nutrition consult

## 2023-10-11 NOTE — PROVIDER TRANSFER
Ms Eva Bloom is a 58 y.o. woman with ESRD, RCC on immunotherapy, CHF, PAD who was admitted to ICU with hypothermia, diarrhea, and lactic acidosis concerning for infection. Blood cultures NGTD. She does have L 3rd toe wound but does not appear infected. Podiatry consulted. Does not make urine. L AVG excision site no infection. Cdiff negative, other stool studies are pending. Started broad antibiotics while awaiting further culture data. Nephrology consulted to continue dialysis. Hypothermia has resolved. Lactic acidosis downtrending. Unclear cause for inciting event. Would monitor cultures. If still no growth tomorrow, consider ID consult. Palliative also consulted as BMI is 15 and she has severe malnutrition.     Cora Costa MD  10/11/2023 3:16 PM

## 2023-10-11 NOTE — ASSESSMENT & PLAN NOTE
Follows with Oncology, last seen 9/2023  On cabozantinib at home   - repeat CT to look for any progression in setting of worsening weight loss- no change  - side effects also include weight loss  - hold home Rx for now

## 2023-10-11 NOTE — ASSESSMENT & PLAN NOTE
10/11/2023 - Consult   - consult received; interval chart reviewed in detail; discussed pt during ICU MDT rounds   - pt known to myself and palliative medicine team from previous admission   - met with patient and , Otis, at bedside; re-introduction to palliative medicine team and role in current care and admission   - pt very cachectic and lying in bed with minimal participation in discussion following orientation questions   -  provided update on recent history since last palliative visit, including decreased appetite in the past months with marked decrease last few weeks (see malnutrition)   - GOC for continued full treatment at this time and work up for possible cause of symptoms   - plan for continued GOC/ACP discussion through admission, including potential improvement of symptoms associated with lack of appetite given concern for pt's overall condition/decline potentially becoming a barrier to qualifying for desired treatments   - emotional support provided   - Allowed time for questions/concerns; all addressed; expressed availability of myself/palliative team for additional questions/concerns

## 2023-10-11 NOTE — ASSESSMENT & PLAN NOTE
Lab Results   Component Value Date    PTH >2,500.0 (H) 02/17/2023    CALCIUM 8.6 (L) 10/11/2023    PHOS 4.9 (H) 10/11/2023     On lanthanum as outpatient- continue

## 2023-10-11 NOTE — CARE UPDATE
[facility]  Nurses Note -- 4 Eyes      10/11/2023       Skin assessed on: Q Shift      [] No Pressure Injuries Present    []Prevention Measures Documented    [x] Yes LDA  for Pressure Injury Previously documented     [] Yes New Pressure Injury Discovered   [] LDA for New Pressure Injury Added      Attending RN:  Maggi Medina RN     Second RN:  Rosendo

## 2023-10-11 NOTE — ASSESSMENT & PLAN NOTE
On TTS HD via [facility]  - Nephrology consulted  - note: s/p AVG excision 9/28/23 for infection. Check blood cultures - NGTD  - wound care consult for LUE site

## 2023-10-11 NOTE — PROGRESS NOTES
[facility] Emergency Dept  Podiatry  Consult Note    Patient Name: Eva Bloom  MRN: [id number]  Admission Date: 10/10/2023  Hospital Length of Stay: 1 days  Attending Physician: Cora Costa MD  Primary Care Provider: Sowmya Mccain MD     Consults  Subjective:     History of Present Illness: Eva Bloom is a 58 y.o. female with  has a past medical history of Anemia, Anticoagulant long-term use, Atrial fibrillation, Bronchitis, Cancer, CHF (congestive heart failure), NYHA class II, chronic, systolic, CMV (cytomegalovirus) antibody positive, Encounter for blood transfusion, ESRD (end stage renal disease), Essential hypertension, H/O herpes simplex type 2 infection, Herpes simplex type 1 antibody positive, History of kidney cancer, Hyperparathyroidism, unspecified, Hyperuricemia without signs of inflammatory arthritis and tophaceous disease, Kidney stones, LGSIL (low grade squamous intraepithelial dysplasia), Myocardiopathy, Prediabetes, Proteinuria, Renal disorder, Thyroid nodule, and Urate nephropathy.  Consult to Podiatry for evaluation treatment of left foot infection.  Location:  3rd digit Onset of the symptoms was late June/early July 2023 when she sustained direct trauma secondary a chair rolling over the foot while barefoot.  Patient is familiar to Podiatry and was seen on previous admit.  She was being followed outpatient by my colleague Dr. Farooq was last seen on 10/02/2023 where he noted purulent drainage and place patient on doxycycline.  Patient also has known history of peripheral arterial occlusive disease and is followed by vascular surgery.  at bedside assisting with history    10/11/23: Patient seen bedside. No heel protector boots in place.     Chief Complaint   Patient presents with    Weight Loss     Pt;s  reports pt is losing weight and sleeping a lot.  Decreased appetite since x6 days.  PT is a dialysis pt Tu-Th-Sat.             Scheduled Meds:   apixaban  2.5 mg  Oral BID    lanthanum  1,000 mg Oral TID WM    levothyroxine  75 mcg Oral Before breakfast    metoprolol succinate  50 mg Oral Daily    piperacillin-tazobactam (Zosyn) IV (PEDS and ADULTS) (extended infusion is not appropriate)  4.5 g Intravenous Q12H    sacubitriL-valsartan  2 tablet Oral BID    sodium chloride 0.9%  10 mL Intravenous Q8H    vitamin renal formula (B-complex-vitamin c-folic acid)  1 capsule Oral Daily     Continuous Infusions:  PRN Meds:acetaminophen, dextrose 10%, dextrose 10%, glucagon (human recombinant), glucose, glucose, HYDROcodone-acetaminophen, loperamide, melatonin, naloxone, ondansetron, prochlorperazine, Pharmacy to dose Vancomycin consult **AND** vancomycin - pharmacy to dose    Review of patient's allergies indicates:   Allergen Reactions    Coreg [carvedilol] Other (See Comments)     Nausea/vomiting    Allopurinol      Other reaction(s): abnormal transaminases        Past Medical History:   Diagnosis Date    Anemia     Anticoagulant long-term use     Atrial fibrillation     Bronchitis 03/01/2017    Cancer 2016    kidney cancer    CHF (congestive heart failure), NYHA class II, chronic, systolic     CMV (cytomegalovirus) antibody positive     Encounter for blood transfusion     ESRD (end stage renal disease)     Essential hypertension 09/23/2015    H/O herpes simplex type 2 infection     Herpes simplex type 1 antibody positive     History of kidney cancer     s/p left nephrectomy 1/2016    Hyperparathyroidism, unspecified     Hyperuricemia without signs of inflammatory arthritis and tophaceous disease     Kidney stones     LGSIL (low grade squamous intraepithelial dysplasia)     Myocardiopathy 07/21/2017    Prediabetes     Proteinuria     Renal disorder     Thyroid nodule     Urate nephropathy      Past Surgical History:   Procedure Laterality Date    BREAST CYST EXCISION      COLONOSCOPY N/A 11/12/2015    COLONOSCOPY N/A 3/12/2021    Procedure: COLONOSCOPY;  Surgeon: Brendon Lanier,  MD;  Location: [facility] ENDO;  Service: Endoscopy;  Laterality: N/A;  covid test 3/9, labs prior, prep instr mailed -ml    EXCISION OF ARTERIOVENOUS FISTULA Left 9/27/2023    Procedure: EXCISION, AV FISTULA;  Surgeon: FARIDEH Muñoz III, MD;  Location: [facility] OR 2ND FLR;  Service: Vascular;  Laterality: Left;  LUE AV graft excision    INSERTION OF BIVENTRICULAR IMPLANTABLE CARDIOVERTER-DEFIBRILLATOR (ICD)  04/2021    INSERTION OF TUNNELED CENTRAL VENOUS CATHETER (CVC) WITH SUBCUTANEOUS PORT N/A 1/25/2023    Procedure: INSERTION, DUAL LUMEN CATHETER WITH PORT, WITH IMAGING GUIDANCE;  Surgeon: FARIDEH Muñoz III, MD;  Location: [facility] OR [facility] FLR;  Service: Peripheral Vascular;  Laterality: N/A;  possinble permcath placment    NEPHRECTOMY-LAPAROSCOPIC Left 01/12/2016    PERCUTANEOUS TRANSLUMINAL ANGIOPLASTY OF ARTERIOVENOUS FISTULA Left 4/19/2023    Procedure: PTA, AV FISTULA;  Surgeon: FARIDEH Muñoz III, MD;  Location: [facility] CATH LAB;  Service: Peripheral Vascular;  Laterality: Left;    PERITONEAL CATHETER INSERTION      Permacath insertion  01/12/2017    PLACEMENT OF ARTERIOVENOUS GRAFT  12/28/2022    Procedure: INSERTION, GRAFT, ARTERIOVENOUS;  Surgeon: FARIDEH Muñoz III, MD;  [facility];  Service: Peripheral Vascular;;    REMOVAL, GRAFT, ARTERIOVENOUS, UPPER EXTREMITY Left 1/25/2023    Procedure: REMOVAL, GRAFT, ARTERIOVENOUS, UPPER EXTREMITY;  Surgeon: FARIDEH Muñoz III, MD;  Location: [facility] OR 2ND FLR;  Service: Peripheral Vascular;  Laterality: Left;    REVISION OF ARTERIOVENOUS FISTULA Left 5/1/2023    Procedure: REVISION, AV FISTULA;  Surgeon: FARIDEH Muñoz III, MD;  Location: [facility] OR 2ND FLR;  Service: Peripheral Vascular;  Laterality: Left;  LUE AVG revision    REVISION OF PROCEDURE INVOLVING ARTERIOVENOUS GRAFT Left 12/28/2022    Procedure: REVISION, PROCEDURE INVOLVING ARTERIOVENOUS GRAFT;  Surgeon: FARIDEH Muñoz III, MD;  Location: [facility] OR [facility] FLR;  Service: Peripheral Vascular;   Laterality: Left;    REVISION OF PROCEDURE INVOLVING ARTERIOVENOUS GRAFT Left 7/21/2023    Procedure: LEFT ARM ARTERIOVENOUS GRAFT EXCISION  AND LIGATION;  Surgeon: Obi Werner MD;  Location: [facility] OR;  Service: Vascular;  Laterality: Left;  Left AVG excision and revision with interposition    SALPINGOOPHORECTOMY Right 2016    KJB---DAVINCI    TONSILLECTOMY      TUBAL LIGATION         Family History       Problem Relation (Age of Onset)    Cataracts Maternal Aunt    Diabetes Father, Maternal Grandfather    Heart disease Sister    Hypertension Mother    Kidney disease Father    No Known Problems Sister, Sister, Brother, Brother, Maternal Uncle, Paternal Aunt, Paternal Uncle, Maternal Grandmother, Paternal Grandmother, Paternal Grandfather, Son, Son, Other          Tobacco Use    Smoking status: Never     Passive exposure: Never    Smokeless tobacco: Never   Substance and Sexual Activity    Alcohol use: No    Drug use: Yes     Types: Hydrocodone    Sexual activity: Not Currently     Review of Systems   Constitutional:  Positive for activity change, appetite change, fatigue and unexpected weight change. Negative for chills and fever.   Respiratory:  Negative for cough and shortness of breath.    Cardiovascular:  Positive for leg swelling. Negative for chest pain.   Gastrointestinal:  Negative for diarrhea, nausea and vomiting.   Musculoskeletal:  Positive for arthralgias and myalgias.   Skin:  Positive for color change and wound.   Neurological:  Negative for weakness.        + paresthesia      Objective:     Vital Signs (Most Recent):  Temp: 96.3 °F (35.7 °C) (10/11/23 1300)  Pulse: 81 (10/11/23 1300)  Resp: 19 (10/11/23 1300)  BP: (!) 124/96 (10/11/23 1300)  SpO2: 100 % (10/11/23 1200) Vital Signs (24h Range):  Temp:  [94.2 °F (34.6 °C)-98.1 °F (36.7 °C)] 96.3 °F (35.7 °C)  Pulse:  [75-92] 81  Resp:  [15-33] 19  SpO2:  [98 %-100 %] 100 %  BP: (103-142)/(57-96) 124/96     Weight: 40.8 kg (90 lb)  Body  "mass index is 15.45 kg/m².    Physical Exam  Constitutional:       General: She is awake. She is not in acute distress.     Appearance: She is underweight.   Cardiovascular:      Comments: Dorsalis pedis and posterior tibial pulses are diminished Bilaterally. Toes are cool to touch. Feet are warm proximally.There is decreased digital hair. Skin is atrophic, slightly hyperpigmented, and feet are mildly edematous      Musculoskeletal:      Comments: Decreased first MPJ range of motion     Patient has hammertoes of digits 2-5 bilateral partially reducible     Fat pad atrophy to heels and met heads bilateral   Skin:     Findings: Wound (see below) present.   Neurological:      Mental Status: She is alert.      Sensory: No sensory deficit.   Psychiatric:         Behavior: Behavior is cooperative.       10/11/23:  Stable eschar left 3rd toe.             10/10/2023    Left  Unstageable lesion to the dorsal left 3rd digit with local edema and erythema and tender to palpation but without fluctuance or crepitus or drainage.      Area of pressure to the plantar left heel      Posterior left Achilles primarily fibrinous to the level of subcutaneous tissue      Laboratory:  A1C:   Recent Labs   Lab 08/25/23  1704   HGBA1C 4.1       CBC:   Recent Labs   Lab 10/11/23  0457   WBC 5.04   RBC 2.74*   HGB 9.2*   HCT 30.7*      *   MCH 33.6*   MCHC 30.0*       CMP:   Recent Labs   Lab 10/11/23  0457   GLU 70   CALCIUM 8.6*   ALBUMIN 2.3*   PROT 5.6*      K 3.6   CO2 18*      BUN 26*   CREATININE 3.6*   ALKPHOS 373*   ALT 38   AST 91*   BILITOT 1.0       CRP: No results for input(s): "CRP" in the last 168 hours.  ESR: No results for input(s): "SEDRATE" in the last 168 hours.  Microbiology Results (last 7 days)       Procedure Component Value Units Date/Time    Blood Culture #1 **CANNOT BE ORDERED STAT** [[id number]] Collected: 10/10/23 1201    Order Status: Completed Specimen: Blood from Peripheral, Forearm, " Right Updated: 10/11/23 1303     Blood Culture, Routine No Growth to date      No Growth to date    Blood Culture #2 **CANNOT BE ORDERED STAT** [[id number]] Collected: 10/10/23 1232    Order Status: Completed Specimen: Blood from Peripheral, Forearm, Right Updated: 10/11/23 1303     Blood Culture, Routine No Growth to date      No Growth to date    Clostridium difficile EIA [[id number]] Collected: 10/11/23 0350    Order Status: Completed Specimen: Stool Updated: 10/11/23 1147     C. diff Antigen Negative     C difficile Toxins A+B, EIA Negative     Comment: Testing not recommended for children <24 months old.       E. coli 0157 antigen [[id number]] Collected: 10/11/23 1129    Order Status: No result Specimen: Stool Updated: 10/11/23 1146    Stool culture [[id number]] Collected: 10/11/23 1129    Order Status: Sent Specimen: Stool Updated: 10/11/23 1146            Diagnostic Results:  Imaging Results              CT Chest Abdomen Pelvis With Contrast (xpd) (Final result)  Result time 10/10/23 16:47:13      Final result by Yaneth Ray MD (10/10/23 16:47:13)                   Impression:      No finding to correlate with the clinical history.    Two new small pulmonary nodules, nonspecific, can be followed on subsequent exams.    Minimal area of ground-glass opacity in the right lower lobe with a small parenchymal opacity in the left upper lobe, new compared to prior imaging, nonspecific, may relate to edema, developing infection, aspiration other etiology.    Possible small hypodensity in the pancreas    Additional findings as above      Electronically signed by: Yaneth Ray MD  Date:    10/10/2023  Time:    16:47               Narrative:    EXAMINATION:  CT CHEST ABDOMEN PELVIS WITH CONTRAST (XPD)    CLINICAL HISTORY:  Sepsis;    TECHNIQUE:  Low dose axial images, sagittal and coronal reformations were obtained from the thoracic inlet to the pubic symphysis following the IV administration of 75 mL of  Omnipaque 350 and no oral contrast.  Note that the timing of the abdominal portion of the examination is early, basically arterial phase imaging.    COMPARISON:  July 2023    FINDINGS:  Chest:    Evaluation of the lungs is limited due to motion. Some artifact created due to pacing device along the left chest wall.    Cluster of tree-in-bud nodules within the anterior right upper lobe similar to prior imaging.  A few nodular foci within the lungs are stable.  There are new small nodules, series 2, image 67, image 77.    Mild ground-glass opacity at the right lung base new.  Minimal parenchymal opacity in the left upper lobe new.    Incompletely imaged dialysis access at the left axilla.  Adjacent calcification is felt to relate to vascular structure.  There is a central venous catheter entering via the left subclavian, with tip at the right atrium.  There is no significant mediastinal or hilar lymphadenopathy.    The heart is enlarged.  There is no significant pleural effusion.  There is small pericardial effusion.  Calcification present along the wall of the aorta with mild ectasia of the ascending aorta.    Abdomen/pelvis:    Lines external to the patient create some artifact.  Timing of the contrast bolus also decreases sensitivity.  There is reflux of contrast into the inferior vena cava and hepatic veins likely relating to right heart dysfunction.    Evaluation of the liver parenchyma limited, with no definite abnormality seen.  Hypodensity seen on previous exam is not well visualized today.  The spleen is not enlarged.    The stomach is unremarkable.  There is cholelithiasis.  No evidence of biliary ductal dilatation.    There is no evidence of adrenal mass.  Questionable small hypodensity in the expected region of the pancreatic tail, evaluation of this region somewhat limited.    The right kidney demonstrates small size.  Multiple hypodense structures within the right kidney difficult to characterize well but  appear grossly similar to prior imaging.    Left kidney has been removed.  Soft tissue density previously described in the nephrectomy bed is overall similar but difficult to distinguish from adjacent pancreatic tail.    There is advanced calcification along the wall of the aorta and branches.  No aortic aneurysm.  No significant periaortic lymphadenopathy.  There is calcification along the wall of inferior vena cava and branches.    Bladder not well distended or well evaluated.  Uterus is likely present.  The evaluation of the bowel is somewhat limited, with no evidence of bowel distension.                                       X-Ray Chest AP Portable (Final result)  Result time 10/10/23 13:53:44      Final result by Yaneth Ray MD (10/10/23 13:53:44)                   Impression:      As above      Electronically signed by: Yaneth Ray MD  Date:    10/10/2023  Time:    13:53               Narrative:    EXAMINATION:  XR CHEST AP PORTABLE    CLINICAL HISTORY:  End stage renal disease    TECHNIQUE:  Single frontal view of the chest was performed.    COMPARISON:  August 25, 2023    FINDINGS:  The heart size is markedly enlarged but similar to previous study.  There are surgical changes within the soft tissues of the left arm.  There is a central venous catheter entering the left subclavian with tip in right atrium.  Pacer device present over the left chest.  Vascular calcification present along the wall of the aorta.    There is osseous demineralization.  There is no evidence of significant pleural fluid on this single view.  No evidence of focal consolidation.  Prominence of pulmonary vasculature similar to prior exam.                                       X-Ray Humerus 2 View Left (Final result)  Result time 10/10/23 13:51:09      Final result by Cameron Pierce MD (10/10/23 13:51:09)                   Impression:      1. No convincing acute displaced fracture or dislocation of the left humerus,  please see above for additional findings.      Electronically signed by: Cameron Pierce MD  Date:    10/10/2023  Time:    13:51               Narrative:    EXAMINATION:  XR HUMERUS 2 VIEW LEFT    CLINICAL HISTORY:  End stage renal disease    COMPARISON:  None    FINDINGS:  Two views left humerus.    There is osteopenia.  No acute displaced fracture or dislocation of the humerus.  Vascular stent projects over the axilla.  Surgical changes are noted of the soft tissues of the upper arm.  No acute displaced rib fracture.  There is coarse interstitial attenuation involving the visualized left lung zones.  Pacing device noted.  There is marked vascular calcification noting suspected upper extremity dialysis fistula.  There is cachectic change of the body wall.                                       X-Ray Foot Complete Left (Final result)  Result time 10/10/23 13:49:41      Final result by Cameron Pierce MD (10/10/23 13:49:41)                   Impression:      1. No acute displaced fracture or dislocation of the foot.      Electronically signed by: Cameron Pierce MD  Date:    10/10/2023  Time:    13:49               Narrative:    EXAMINATION:  XR FOOT COMPLETE 3 VIEW LEFT    CLINICAL HISTORY:  .  Unspecified open wound of unspecified toe(s) without damage to nail, initial encounter    TECHNIQUE:  AP, lateral and oblique views of the left foot were performed.    COMPARISON:  07/26/2023    FINDINGS:  Three views left foot.    There is diffuse osteopenia.  There is marked vascular calcification.  There are degenerative changes of the foot.  No convincing acute displaced fracture or dislocation of the foot.  No radiopaque foreign body.  No significant edema.                                      Assessment/Plan:     Active Diagnoses:    Diagnosis Date Noted POA    PRINCIPAL PROBLEM:  Severe sepsis [A41.9, R65.20] 10/29/2022 Yes    Severe protein-calorie malnutrition [E43] 10/11/2023 Yes    Weight loss [R63.4] 10/10/2023  Yes    Body mass index (BMI) less than 19 [Z68.1] 09/28/2023 Not Applicable    Chronic combined systolic and diastolic heart failure [I50.42] 08/25/2023 Yes    Metastatic renal cell carcinoma to lung, unspecified laterality [C78.00, C64.9] 08/11/2023 Yes    Hypothyroidism [E03.9] 07/19/2023 Yes     Chronic    PAF (paroxysmal atrial fibrillation) [I48.0] 07/25/2022 Yes     Chronic    ICD (implantable cardioverter-defibrillator) in place - SubQ [Z95.810] 07/15/2021 Yes    Anemia in ESRD (end-stage renal disease) [N18.6, D63.1]  Yes     Chronic    ESRD (end stage renal disease) on dialysis [N18.6, Z99.2]  Not Applicable     Chronic    Essential hypertension [I10] 09/23/2015 Yes     Chronic    Hyperparathyroidism, secondary renal [N25.81]  Yes      Problems Resolved During this Admission:       Education about the prevention of limb loss.    Discussed wound healing cycle, skin integrity, ways to care for skin.Counseled patient on the effects of biomechanical pressure and PAOD on healing.     Recent foot Imaging reviewed, no bone involvement or gas in the soft tissues noted.     Wound cultures obtained by my colleague on October 2nd no growth to date.    Wound to the left 3rd digit is unstageable but stable in appearance.   Would need more advanced imaging to the foot if there is suspicion that foot may be an infection source.  Vashe wet to dry to the digit to try to soften the eschar and perhaps allow for debridement in the future, nursing wound care orders placed.  Nursing instructed to ensure patient has heel protector boots on given the areas of pressure already present to the left lower extremity that we do not want to regress during admission.    Heel protector boots placed.     Thank you for your consult. I will follow-up with patient. Please contact us if you have any additional questions.    Jael Rosario DPM  Podiatry  [facility] - Emergency Dept

## 2023-10-11 NOTE — CONSULTS
[facility] - Intensive Care  Wound Care    Patient Name:  Eva Bloom   MRN:  [id number]  Date: 10/11/2023  Diagnosis: Severe sepsis    History:     Past Medical History:   Diagnosis Date    Anemia     Anticoagulant long-term use     Atrial fibrillation     Bronchitis 03/01/2017    Cancer 2016    kidney cancer    CHF (congestive heart failure), NYHA class II, chronic, systolic     CMV (cytomegalovirus) antibody positive     Encounter for blood transfusion     ESRD (end stage renal disease)     Essential hypertension 09/23/2015    H/O herpes simplex type 2 infection     Herpes simplex type 1 antibody positive     History of kidney cancer     s/p left nephrectomy 1/2016    Hyperparathyroidism, unspecified     Hyperuricemia without signs of inflammatory arthritis and tophaceous disease     Kidney stones     LGSIL (low grade squamous intraepithelial dysplasia)     Myocardiopathy 07/21/2017    Prediabetes     Proteinuria     Renal disorder     Thyroid nodule     Urate nephropathy        Social History     Socioeconomic History    Marital status:     Number of children: 2   Occupational History    Occupation: H[employer]     Employer: [id number]   Tobacco Use    Smoking status: Never     Passive exposure: Never    Smokeless tobacco: Never   Substance and Sexual Activity    Alcohol use: No    Drug use: Yes     Types: Hydrocodone    Sexual activity: Not Currently     Social Determinants of Health     Financial Resource Strain: Low Risk  (9/28/2023)    Overall Financial Resource Strain (CARDIA)     Difficulty of Paying Living Expenses: Not hard at all   Food Insecurity: No Food Insecurity (9/28/2023)    Hunger Vital Sign     Worried About Running Out of Food in the Last Year: Never true     Ran Out of Food in the Last Year: Never true   Transportation Needs: No Transportation Needs (9/28/2023)    PRAPARE - Transportation     Lack of Transportation (Medical): No     Lack of Transportation (Non-Medical): No    Physical Activity: Inactive (9/28/2023)    Exercise Vital Sign     Days of Exercise per Week: 0 days     Minutes of Exercise per Session: 0 min   Stress: No Stress Concern Present (9/28/2023)    [employer] [place] [employer] - Occupational Stress Questionnaire     Feeling of Stress : Not at all   Social Connections: Moderately Integrated (9/28/2023)    Social Connection and Isolation Panel [NHANES]     Frequency of Communication with Friends and Family: More than three times a week     Frequency of Social Gatherings with Friends and Family: More than three times a week     Attends [employer] Services: More than 4 times per year     Active Member of Clubs or Organizations: No     Attends Club or Organization Meetings: Never     Marital Status:    Housing Stability: Low Risk  (9/28/2023)    Housing Stability Vital Sign     Unable to Pay for Housing in the Last Year: No     Number of Places Lived in the Last Year: 1     Unstable Housing in the Last Year: No       Precautions:     Allergies as of 10/10/2023 - Reviewed 10/10/2023   Allergen Reaction Noted    Coreg [carvedilol] Other (See Comments) 05/17/2017    Allopurinol  07/17/2012       [facility] Assessment Details/Treatment   Consulted for altered skin integrity sacrum and left toe  A 58 year old female admitted 10/10/23 from home with severe sepsis; weight loss; BMI less than 19; chronic combined systolic and diastolic heart failure; metastatic renal cell carcinoma of lung; hypothyroidism; PAF; ICD; anemia in ESRD; ESRD on HD; essential HTN; hyperparathyroidism, secondary renal  10/11 WBC 5.04 Hgb 9.2 Hct 30.7 Alb 2.3 Weight 90 lbs  On Isolibrium mattress; Marcelino 15  S/P 9/27 Excision left AV fistula per Dr. Muñoz for infection of AV graft for dialysis  10/10 Podiatry consult- Orders for care of left 3rd toe and boots to protect LE  10/10 1725 2 Clinical Staff Member Skin Inspection and LDAs for left upper arm, right shin Stage 1 pressure injury,  left distal heel, right knee Stage 2 pressure injury, sacral Stage 1 pressure injury and left toe ulceration  WOGs for pressure injuries and skin tear  Assessment:  Treatment of lower extremities per Podiatry  Photodocumentation (from Media)    Left forearm wound- 1 cm opening with white slough in base of wound. Scant serous drainage. No surrounding erythema/induration.    Perirectal skin- Severely denuded. Having frequent incontinent thick stool.   Treatment:  Local wound care to left upper arm wound every shift with Vashe moistened gauze.  Local wound care to perirectal skin with Remedy No Rinse Cleanser and Remedy Zinc product after each incontinent stool  Treatment discussed with nursing  Recommendations made to primary team. Orders placed.     10/11/2023

## 2023-10-12 PROBLEM — T68.XXXA HYPOTHERMIA: Status: ACTIVE | Noted: 2023-10-12

## 2023-10-12 LAB
ALBUMIN SERPL BCP-MCNC: 2.3 G/DL (ref 3.5–5.2)
ALP SERPL-CCNC: 359 U/L (ref 55–135)
ALT SERPL W/O P-5'-P-CCNC: 36 U/L (ref 10–44)
ANION GAP SERPL CALC-SCNC: 20 MMOL/L (ref 8–16)
AST SERPL-CCNC: 81 U/L (ref 10–40)
BASOPHILS # BLD AUTO: 0.09 K/UL (ref 0–0.2)
BASOPHILS NFR BLD: 1.8 % (ref 0–1.9)
BILIRUB SERPL-MCNC: 1 MG/DL (ref 0.1–1)
BUN SERPL-MCNC: 34 MG/DL (ref 6–20)
CALCIUM SERPL-MCNC: 9.2 MG/DL (ref 8.7–10.5)
CHLORIDE SERPL-SCNC: 103 MMOL/L (ref 95–110)
CO2 SERPL-SCNC: 16 MMOL/L (ref 23–29)
CREAT SERPL-MCNC: 4.9 MG/DL (ref 0.5–1.4)
CRYPTOSP AG STL QL IA: NEGATIVE
DIFFERENTIAL METHOD: ABNORMAL
E COLI SXT1 STL QL IA: NEGATIVE
E COLI SXT2 STL QL IA: NEGATIVE
EOSINOPHIL # BLD AUTO: 0.2 K/UL (ref 0–0.5)
EOSINOPHIL NFR BLD: 4.3 % (ref 0–8)
ERYTHROCYTE [DISTWIDTH] IN BLOOD BY AUTOMATED COUNT: 22.3 % (ref 11.5–14.5)
EST. GFR  (NO RACE VARIABLE): 10 ML/MIN/1.73 M^2
G LAMBLIA AG STL QL IA: NEGATIVE
GLUCOSE SERPL-MCNC: 76 MG/DL (ref 70–110)
HCT VFR BLD AUTO: 32.2 % (ref 37–48.5)
HGB BLD-MCNC: 9.8 G/DL (ref 12–16)
IMM GRANULOCYTES # BLD AUTO: 0.02 K/UL (ref 0–0.04)
IMM GRANULOCYTES NFR BLD AUTO: 0.4 % (ref 0–0.5)
LYMPHOCYTES # BLD AUTO: 1 K/UL (ref 1–4.8)
LYMPHOCYTES NFR BLD: 20.3 % (ref 18–48)
MAGNESIUM SERPL-MCNC: 2.2 MG/DL (ref 1.6–2.6)
MCH RBC QN AUTO: 33.4 PG (ref 27–31)
MCHC RBC AUTO-ENTMCNC: 30.4 G/DL (ref 32–36)
MCV RBC AUTO: 110 FL (ref 82–98)
MONOCYTES # BLD AUTO: 0.3 K/UL (ref 0.3–1)
MONOCYTES NFR BLD: 5.3 % (ref 4–15)
NEUTROPHILS # BLD AUTO: 3.4 K/UL (ref 1.8–7.7)
NEUTROPHILS NFR BLD: 67.9 % (ref 38–73)
NRBC BLD-RTO: 3 /100 WBC
PHOSPHATE SERPL-MCNC: 6.2 MG/DL (ref 2.7–4.5)
PLATELET # BLD AUTO: 150 K/UL (ref 150–450)
PMV BLD AUTO: 11.4 FL (ref 9.2–12.9)
POTASSIUM SERPL-SCNC: 3.5 MMOL/L (ref 3.5–5.1)
PROCALCITONIN SERPL IA-MCNC: 0.83 NG/ML
PROT SERPL-MCNC: 5.7 G/DL (ref 6–8.4)
RBC # BLD AUTO: 2.93 M/UL (ref 4–5.4)
SODIUM SERPL-SCNC: 139 MMOL/L (ref 136–145)
VANCOMYCIN SERPL-MCNC: 13.3 UG/ML
WBC # BLD AUTO: 4.93 K/UL (ref 3.9–12.7)

## 2023-10-12 PROCEDURE — 80100016 HC MAINTENANCE HEMODIALYSIS

## 2023-10-12 PROCEDURE — 36415 COLL VENOUS BLD VENIPUNCTURE: CPT | Performed by: HOSPITALIST

## 2023-10-12 PROCEDURE — 80202 ASSAY OF VANCOMYCIN: CPT | Performed by: HOSPITALIST

## 2023-10-12 PROCEDURE — 84145 PROCALCITONIN (PCT): CPT | Performed by: HOSPITALIST

## 2023-10-12 PROCEDURE — 25000003 PHARM REV CODE 250: Performed by: INTERNAL MEDICINE

## 2023-10-12 PROCEDURE — 84100 ASSAY OF PHOSPHORUS: CPT | Performed by: HOSPITALIST

## 2023-10-12 PROCEDURE — 80053 COMPREHEN METABOLIC PANEL: CPT | Performed by: HOSPITALIST

## 2023-10-12 PROCEDURE — 25000003 PHARM REV CODE 250: Performed by: HOSPITALIST

## 2023-10-12 PROCEDURE — A4216 STERILE WATER/SALINE, 10 ML: HCPCS | Performed by: HOSPITALIST

## 2023-10-12 PROCEDURE — 99223 PR INITIAL HOSPITAL CARE,LEVL III: ICD-10-PCS | Mod: GW,HPC,, | Performed by: INTERNAL MEDICINE

## 2023-10-12 PROCEDURE — 21400001 HC TELEMETRY ROOM

## 2023-10-12 PROCEDURE — 99223 1ST HOSP IP/OBS HIGH 75: CPT | Mod: GW,HPC,, | Performed by: INTERNAL MEDICINE

## 2023-10-12 PROCEDURE — 85025 COMPLETE CBC W/AUTO DIFF WBC: CPT | Performed by: HOSPITALIST

## 2023-10-12 PROCEDURE — 25500020 PHARM REV CODE 255: Performed by: HOSPITALIST

## 2023-10-12 PROCEDURE — 83735 ASSAY OF MAGNESIUM: CPT | Performed by: HOSPITALIST

## 2023-10-12 PROCEDURE — 63600175 PHARM REV CODE 636 W HCPCS: Performed by: INTERNAL MEDICINE

## 2023-10-12 PROCEDURE — 63600175 PHARM REV CODE 636 W HCPCS: Performed by: HOSPITALIST

## 2023-10-12 RX ORDER — METRONIDAZOLE 250 MG/1
500 TABLET ORAL EVERY 12 HOURS
Status: DISCONTINUED | OUTPATIENT
Start: 2023-10-12 | End: 2023-10-15

## 2023-10-12 RX ORDER — L. ACIDOPHILUS/L.BULGARICUS 100MM CELL
1 GRANULES IN PACKET (EA) ORAL 2 TIMES DAILY
Status: DISCONTINUED | OUTPATIENT
Start: 2023-10-12 | End: 2023-10-18 | Stop reason: HOSPADM

## 2023-10-12 RX ADMIN — ERYTHROPOIETIN 4000 UNITS: 4000 INJECTION, SOLUTION INTRAVENOUS; SUBCUTANEOUS at 07:10

## 2023-10-12 RX ADMIN — Medication 10 ML: at 05:10

## 2023-10-12 RX ADMIN — MUPIROCIN: 20 OINTMENT TOPICAL at 09:10

## 2023-10-12 RX ADMIN — IOHEXOL 75 ML: 350 INJECTION, SOLUTION INTRAVENOUS at 08:10

## 2023-10-12 RX ADMIN — CEFTRIAXONE 2 G: 2 INJECTION, POWDER, FOR SOLUTION INTRAMUSCULAR; INTRAVENOUS at 10:10

## 2023-10-12 RX ADMIN — LEVOTHYROXINE SODIUM 75 MCG: 75 TABLET ORAL at 05:10

## 2023-10-12 RX ADMIN — SACUBITRIL AND VALSARTAN 2 TABLET: 24; 26 TABLET, FILM COATED ORAL at 09:10

## 2023-10-12 RX ADMIN — Medication 10 ML: at 02:10

## 2023-10-12 RX ADMIN — APIXABAN 2.5 MG: 2.5 TABLET, FILM COATED ORAL at 09:10

## 2023-10-12 RX ADMIN — NEPHROCAP 1 CAPSULE: 1 CAP ORAL at 09:10

## 2023-10-12 RX ADMIN — VANCOMYCIN HYDROCHLORIDE 500 MG: 500 INJECTION, POWDER, LYOPHILIZED, FOR SOLUTION INTRAVENOUS at 10:10

## 2023-10-12 RX ADMIN — PIPERACILLIN AND TAZOBACTAM 4.5 G: 4; .5 INJECTION, POWDER, LYOPHILIZED, FOR SOLUTION INTRAVENOUS; PARENTERAL at 02:10

## 2023-10-12 RX ADMIN — METOPROLOL SUCCINATE 50 MG: 50 TABLET, EXTENDED RELEASE ORAL at 09:10

## 2023-10-12 RX ADMIN — DRONABINOL 2.5 MG: 2.5 CAPSULE ORAL at 11:10

## 2023-10-12 RX ADMIN — Medication 10 ML: at 10:10

## 2023-10-12 RX ADMIN — DRONABINOL 2.5 MG: 2.5 CAPSULE ORAL at 09:10

## 2023-10-12 RX ADMIN — PIPERACILLIN AND TAZOBACTAM 4.5 G: 4; .5 INJECTION, POWDER, LYOPHILIZED, FOR SOLUTION INTRAVENOUS; PARENTERAL at 12:10

## 2023-10-12 RX ADMIN — LACTOBACILLUS ACIDOPHILUS / LACTOBACILLUS BULGARICUS 1 EACH: 100 MILLION CFU STRENGTH GRANULES at 09:10

## 2023-10-12 RX ADMIN — METRONIDAZOLE 500 MG: 250 TABLET ORAL at 09:10

## 2023-10-12 NOTE — ASSESSMENT & PLAN NOTE
Advance Care Planning    Patient remains full code, but is hospice appropriate.  Palliative care consulted and input appreciated.  Continue active GOC discussions.

## 2023-10-12 NOTE — ASSESSMENT & PLAN NOTE
-Admitted to inpatient status  -On admit hypothermic without tachycardia, leukocytosis but with lactic acidosis and mildly elevated procalcitonin  -CT chest/abd/pelvis- no new source of infection noted  -CT L foot shows no evidence for osteomyelitis  -Organ dysfunction indicated by Encephalopathy  -Blood cultures negative  -C.diff negative.  No fecal WBC. Giardia and crypto Ag negative.  Stool culture unremarkable.  -Note she is s/p AVG excision 9/28/23 for infection.   -Source of sepsis is unclear - has diarrhea and noted skin/soft tissue wound on foot  -Podiatry consulted to evaluate foot wounds- appears stable, no signs of cellulitis  -Unclear if this is actual sepsis or SIRS due to non-infectious etiology  -Check AM cortisol  -Consult infectious disease  -Continue warming blanket for hypothermia as needed  -Continue vancomycin and zosyn for now.

## 2023-10-12 NOTE — NURSING TRANSFER
Nursing Transfer Note      10/12/2023   6:14 AM    Reason patient is being transferred: No longer in need of critical care    Transfer To: Telemetry unit    Transfer via wheelchair    Transported by transport    Order for Tele Monitor? No    Medicines sent: Mupirocin    Any special needs or follow-up needed: n/a    Patient belongings transferred with patient: Yes    Chart send with patient: Yes    Notified: spouse    Patient reassessed at: 10/12/23, 4915

## 2023-10-12 NOTE — ASSESSMENT & PLAN NOTE
-Patient has Paroxysmal (<7 days) atrial fibrillation which is controlled.   -Patient is currently in sinus rhythm.  -Continue toprol and eliquis

## 2023-10-12 NOTE — ASSESSMENT & PLAN NOTE
-This is persistent despite broad spectrum antibiotics  -Unclear if due to sepsis or other cause  -Noted elevated TSH, but it is improved and she is on synthroid  -Check AM cortisol  -Continue warming blanket

## 2023-10-12 NOTE — ASSESSMENT & PLAN NOTE
"59y/o F with ESRD on HD TTS via [facility] (s/p AVG excision), HFp/rEF, PAD with toe wounds, metastatic RCC on immunotherapy since 2016 who presented for unintentional weight loss and diarrhea x 1 wk and was admitted for presumed sepsis. Has been intermittently hypothermic since admit.    Has undergone extensive septic workup that has thus far been unrevealing:    CT L foot w/o evidence of OM. Eval by podiatry and does not appear to be infected. CT chest revealed 2 new small pulm nodules and minimal area of GGO in RLL and small opacity AYE, which could be developing infection or aspiration. However sats at goal on RA and pt without respi symptoms. Tested neg for cdiff. BCx 10/10 NGTD.     ID consulted for: "Presented with sepsis/hypothermia - no clear infection identified.  Are further diagnostics warranted and/or antibiotics needed?"Pt lethargic on exam and a poor historian. Only complains of fatigue and diarrhea.     Unclear if elevated Lactic acid and hypothermia are due to infectious process. Awaiting final stool culture results. Could this be due to known malignancy? Or hypothyroidism?. Would rule out noninfectious etiologies.     Recommendations:  --transition to ceftriaxone / flagyl to cover for possible IA infection and aspiration pna   --f/u stool cx, blood cx    "

## 2023-10-12 NOTE — PROGRESS NOTES
Eva Bloom is a 58 y.o. female patient.    Follow for ESRD, dialysis    No new c/o, comfortable  HD planned for this afternoon    Scheduled Meds:   sodium chloride 0.9%   Intravenous Once    apixaban  2.5 mg Oral BID    droNABinol  2.5 mg Oral BID    epoetin david (PROCRIT) injection  4,000 Units Intravenous Every Tues, Thurs, Sat    lanthanum  1,000 mg Oral TID WM    levothyroxine  75 mcg Oral Before breakfast    metoprolol succinate  50 mg Oral Daily    mupirocin   Nasal BID    piperacillin-tazobactam (Zosyn) IV (PEDS and ADULTS) (extended infusion is not appropriate)  4.5 g Intravenous Q12H    sacubitriL-valsartan  2 tablet Oral BID    sodium chloride 0.9%  10 mL Intravenous Q8H    vitamin renal formula (B-complex-vitamin c-folic acid)  1 capsule Oral Daily       Review of patient's allergies indicates:   Allergen Reactions    Coreg [carvedilol] Other (See Comments)     Nausea/vomiting    Allopurinol      Other reaction(s): abnormal transaminases         Vital Signs Range (Last 24H):  Temp:  [94.2 °F (34.6 °C)-96.9 °F (36.1 °C)]   Pulse:  [73-83]   Resp:  [11-28]   BP: (102-136)/(63-96)   SpO2:  [98 %-100 %]     I & O (Last 24H):  Intake/Output Summary (Last 24 hours) at 10/12/2023 1106  Last data filed at 10/12/2023 0955  Gross per 24 hour   Intake 151.96 ml   Output --   Net 151.96 ml           Physical Exam:  General appearance: well developed, cachectic, no distress  Lungs:  clear to auscultation bilaterally and normal respiratory effort  Heart: regular rate and rhythm  Abdomen:  soft, normal bowel sounds  Extremities: edema negative    Laboratory:  I have reviewed all pertinent lab results within the past 24 hours.  CBC:   Recent Labs   Lab 10/12/23  0404   WBC 4.93   RBC 2.93*   HGB 9.8*   HCT 32.2*      *   MCH 33.4*   MCHC 30.4*     CMP:   Recent Labs   Lab 10/12/23  0404   GLU 76   CALCIUM 9.2   ALBUMIN 2.3*   PROT 5.7*      K 3.5   CO2 16*      BUN 34*   CREATININE  4.9*   ALKPHOS 359*   ALT 36   AST 81*   BILITOT 1.0       Assessment & Plan    ESRD - scheduled for HD later today  Cachexia  RCCA - metastatic  HTN  Anemia of CKD/malignancy    Continue present RX  HD later today  We'll follow for dialysis        Maren Puentes  10/12/2023

## 2023-10-12 NOTE — ASSESSMENT & PLAN NOTE
-Body mass index is 15.45 kg/m².  -Noted unintentional weight loss  -Nutrition consulted  -Agree with trial of dronabinol

## 2023-10-12 NOTE — ASSESSMENT & PLAN NOTE
-History noted  -Follows with Oncology, last seen 9/2023  -On cabozantinib at home   -Repeat CT to look for any progression in setting of worsening weight loss - showed Two new small pulmonary nodules, minimal area of ground-glass opacity in the right lower lobe with a small parenchymal opacity in the left upper lobe and possible small hypodensity in the pancreas  -hold home Rx for now   -Consulted palliative care

## 2023-10-12 NOTE — CONSULTS
"[facility] - Telemetry  Infectious Disease  Consult Note    Patient Name: Eva Bloom  MRN: [id number]  Admission Date: 10/10/2023  Hospital Length of Stay: 2 days  Attending Physician: Leeroy Cheney MD  Primary Care Provider: Sowmya Mccain MD     Isolation Status: No active isolations    Patient information was obtained from patient and ER records.      Inpatient consult to Infectious Diseases  Consult performed by: Oumou Tejada MD  Consult ordered by: Leeroy Cheney MD        Assessment/Plan:     Other  Hypothermia  57y/o F with ESRD on HD TTS via [facility] (s/p AVG excision), HFp/rEF, PAD with toe wounds, metastatic RCC on immunotherapy since 2016 who presented for unintentional weight loss and diarrhea x 1 wk and was admitted for presumed sepsis. Has been intermittently hypothermic since admit.    Has undergone extensive septic workup that has thus far been unrevealing:    CT L foot w/o evidence of OM. Eval by podiatry and does not appear to be infected. CT chest revealed 2 new small pulm nodules and minimal area of GGO in RLL and small opacity AYE, which could be developing infection or aspiration. However sats at goal on RA and pt without respi symptoms. Tested neg for cdiff. BCx 10/10 NGTD.     ID consulted for: "Presented with sepsis/hypothermia - no clear infection identified.  Are further diagnostics warranted and/or antibiotics needed?"Pt lethargic on exam and a poor historian. Only complains of fatigue and diarrhea.     Unclear if elevated Lactic acid and hypothermia are due to infectious process. Awaiting final stool culture results. Could this be due to dehydration from diarrhea, known malignancy? Or hypothyroidism?. Would rule out noninfectious etiologies as well. currently on zosyn.    Recommendations:  --transition to ceftriaxone / flagyl to cover for possible IA infection and aspiration pna   --f/u stool cx, blood cx          Thank you for your consult. I will follow-up with " "patient. Please contact us if you have any additional questions.    Oumou Tejada MD  Infectious Disease  [facility] - Telemetry    Subjective:     Principal Problem: Severe sepsis    HPI: Ms. Bloom is a 57y/o F with ESRD on HD TTS via [facility], HFp/rEF, PAD with toe wounds, metastatic RCC on immunotherapy since 2016 who presented for unintentional weight loss and was admitted for presumed sepsis.     Per chart, pt with unintentional weight loss over the last few weeks. Her appetite is poor but stable. No nausea, vomiting, dysphagia, abdominal pain noted. However, has been having diarrhea described as nonbloody liquid stools 3-4x/day over last week. Does not make urine. She is s/p recent L AVG excision, site is healing well with no drainage or erythema.  She does have L 3rd toe ulceration present. Was prescribed doxycycline after last appointment but never started it. Toe wound cx 10/2 neg.     In ED pt was hypothermic. Labs remarkable for no leukocytosis, elevated TSH (14.7), LA (5.3), BNP (>4,900).  Started on empiric vanc and zosyn. CT L foot w/o evidence of OM. Eval by podiatry and does not appear to be infected. CT chest revealed 2 new small pulm nodules and minimal area of GGO in RLL and small opacity AYE, which could be developing infection or aspiration.  Tested neg for cdiff.     ID consulted for: "Presented with sepsis/hypothermia - no clear infection identified.  Are further diagnostics warranted and/or antibiotics needed?"  Pt lethargic on exam and a poor historian. Only complains of fatigue and diarrhea.           Past Medical History:   Diagnosis Date    Anemia     Anticoagulant long-term use     Atrial fibrillation     Bronchitis 03/01/2017    Cancer 2016    kidney cancer    CHF (congestive heart failure), NYHA class II, chronic, systolic     CMV (cytomegalovirus) antibody positive     Encounter for blood transfusion     ESRD (end stage renal disease)     Essential hypertension 09/23/2015    " H/O herpes simplex type 2 infection     Herpes simplex type 1 antibody positive     History of kidney cancer     s/p left nephrectomy 1/2016    Hyperparathyroidism, unspecified     Hyperuricemia without signs of inflammatory arthritis and tophaceous disease     Kidney stones     LGSIL (low grade squamous intraepithelial dysplasia)     Myocardiopathy 07/21/2017    Prediabetes     Proteinuria     Renal disorder     Thyroid nodule     Urate nephropathy        Past Surgical History:   Procedure Laterality Date    BREAST CYST EXCISION      COLONOSCOPY N/A 11/12/2015    COLONOSCOPY N/A 3/12/2021    Procedure: COLONOSCOPY;  Surgeon: Brendon Lanier MD;  Location: [facility] ENDO;  Service: Endoscopy;  Laterality: N/A;  covid test 3/9, labs prior, prep instr mailed -ml    EXCISION OF ARTERIOVENOUS FISTULA Left 9/27/2023    Procedure: EXCISION, AV FISTULA;  Surgeon: FARIDEH Muoñz III, MD;  [facility];  Service: Vascular;  Laterality: Left;  LUE AV graft excision    INSERTION OF BIVENTRICULAR IMPLANTABLE CARDIOVERTER-DEFIBRILLATOR (ICD)  04/2021    INSERTION OF TUNNELED CENTRAL VENOUS CATHETER (CVC) WITH SUBCUTANEOUS PORT N/A 1/25/2023    Procedure: INSERTION, DUAL LUMEN CATHETER WITH PORT, WITH IMAGING GUIDANCE;  Surgeon: FARIDEH Muñoz III, MD;  [facility];  Service: Peripheral Vascular;  Laterality: N/A;  possinble permcath placment    NEPHRECTOMY-LAPAROSCOPIC Left 01/12/2016    PERCUTANEOUS TRANSLUMINAL ANGIOPLASTY OF ARTERIOVENOUS FISTULA Left 4/19/2023    Procedure: PTA, AV FISTULA;  Surgeon: FARIDEH Muñoz III, MD;  Location: [facility] CATH LAB;  Service: Peripheral Vascular;  Laterality: Left;    PERITONEAL CATHETER INSERTION      Permacath insertion  01/12/2017    PLACEMENT OF ARTERIOVENOUS GRAFT  12/28/2022    Procedure: INSERTION, GRAFT, ARTERIOVENOUS;  Surgeon: FARIDEH Muñoz III, MD;  Location: [facility] OR [facility] FLR;  Service: Peripheral Vascular;;    REMOVAL, GRAFT, ARTERIOVENOUS, UPPER  EXTREMITY Left 1/25/2023    Procedure: REMOVAL, GRAFT, ARTERIOVENOUS, UPPER EXTREMITY;  Surgeon: FARIDEH Muñoz III, MD;  Location: [facility] OR 2ND FLR;  Service: Peripheral Vascular;  Laterality: Left;    REVISION OF ARTERIOVENOUS FISTULA Left 5/1/2023    Procedure: REVISION, AV FISTULA;  Surgeon: FARIDEH Muñoz III, MD;  Location: [facility] OR 2ND FLR;  Service: Peripheral Vascular;  Laterality: Left;  LUE AVG revision    REVISION OF PROCEDURE INVOLVING ARTERIOVENOUS GRAFT Left 12/28/2022    Procedure: REVISION, PROCEDURE INVOLVING ARTERIOVENOUS GRAFT;  Surgeon: FARIDEH Muñoz III, MD;  Location: [facility] OR 2ND FLR;  Service: Peripheral Vascular;  Laterality: Left;    REVISION OF PROCEDURE INVOLVING ARTERIOVENOUS GRAFT Left 7/21/2023    Procedure: LEFT ARM ARTERIOVENOUS GRAFT EXCISION  AND LIGATION;  Surgeon: Obi Werner MD;  Location: [facility] OR;  Service: Vascular;  Laterality: Left;  Left AVG excision and revision with interposition    SALPINGOOPHORECTOMY Right 2016    KJB---DAVINCI    TONSILLECTOMY      TUBAL LIGATION         Review of patient's allergies indicates:   Allergen Reactions    Coreg [carvedilol] Other (See Comments)     Nausea/vomiting    Allopurinol      Other reaction(s): abnormal transaminases       Medications:  Medications Prior to Admission   Medication Sig    apixaban (ELIQUIS) 2.5 mg Tab Take 1 tablet (2.5 mg total) by mouth 2 (two) times daily.    aspirin 81 MG Chew Take 1 tablet (81 mg total) by mouth once daily.    cabozantinib (CABOMETYX) 60 mg Tab TAKE ONE TABLET BY MOUTH ONCE DAILY AT THE SAME TIME ON AN EMPTY STOMACH AT LEAST 1 HOUR BEFORE OR 2 HOURS AFTER EATING. AVOID GRAPEFRUIT PRODUCTS    cloNIDine 0.3 mg/24 hr td ptwk (CATAPRES) 0.3 mg/24 hr Place 1 patch onto the skin every 7 days.    cyclobenzaprine (FLEXERIL) 5 MG tablet TAKE 1 TABLET BY MOUTH DAILY AS NEEDED FOR MUSCLE SPASMS.    ENTRESTO 49-51 mg per tablet TAKE 1 TABLET BY MOUTH TWICE A DAY    fluticasone propionate  (FLONASE) 50 mcg/actuation nasal spray 1 spray (50 mcg total) by Each Nostril route 2 (two) times daily as needed (ear fullness).    HYDROcodone-acetaminophen (NORCO) 5-325 mg per tablet Take 1 tablet by mouth every 6 (six) hours as needed for Pain.    levothyroxine (SYNTHROID) 75 MCG tablet Take 1 tablet (75 mcg total) by mouth once daily.    metoprolol succinate (TOPROL-XL) 100 MG 24 hr tablet Take 1/2 tablet (50mg) once daily    acetaminophen (TYLENOL) 500 MG tablet Take 500 mg by mouth every 6 (six) hours as needed for Pain.    epoetin david-epbx (RETACRIT INJ) Epoetin david - epbx (Retacrit)    hydrALAZINE (APRESOLINE) 100 MG tablet Take 1 tablet (100 mg total) by mouth every 12 (twelve) hours.    lanthanum (FOSRENOL) 1000 MG chewable tablet Take 1 tablet by mouth.    lidocaine-prilocaine (EMLA) cream APPLY ATLEAST 30 MINUTES BEFORE TREATMENT 3 TIMES A WEEK    mv,Ca,min-folic acid-vit K1 (ONE-A-DAY WOMEN'S 50 PLUS) 400-20 mcg Tab Take 1 tablet by mouth once daily.    naloxone (NARCAN) 1 mg/mL injection 2 mg (1 mg per nostril) by Nasal route as needed for opioid overdose; may repeat in 3 to 5 minutes if not effective. Call 911 (Patient not taking: Reported on 10/3/2023)     Antibiotics (From admission, onward)      Start     Stop Route Frequency Ordered    10/11/23 2100  mupirocin 2 % ointment         10/16/23 2059 Nasl 2 times daily 10/11/23 1728    10/11/23 0100  piperacillin-tazobactam (ZOSYN) 4.5 g in dextrose 5 % in water (D5W) 100 mL IVPB (MB+)         -- IV Every 12 hours (non-standard times) 10/10/23 1556    10/10/23 1345  vancomycin - pharmacy to dose  (vancomycin IVPB (PEDS and ADULTS))        See Jael for full Linked Orders Report.    -- IV pharmacy to manage frequency 10/10/23 1245          Antifungals (From admission, onward)      None          Antivirals (From admission, onward)      None             Immunization History   Administered Date(s) Administered    COVID-19, MRNA, LN-S, PF (MODERNA  FULL 0.5 ML DOSE) 01/15/2021, 02/11/2021, 11/11/2021    Hepatitis A, Adult 09/23/2015    Hepatitis B (recombinant) Adjuvanted, 2 dose 11/15/2022    Hepatitis B, Adult 09/23/2015, 03/12/2016, 07/22/2016, 09/23/2016, 02/14/2019    Influenza 09/06/2011, 10/09/2012, 09/14/2013, 10/09/2015, 09/19/2020    Influenza - Quadrivalent 09/13/2018, 09/24/2019, 09/19/2020    Influenza - Quadrivalent - PF *Preferred* (6 months and older) 09/14/2013, 10/04/2014, 10/09/2015, 10/01/2017, 09/19/2020, 09/30/2021, 10/15/2022    Influenza Split 09/06/2011, 10/09/2012, 10/09/2015    Influenza Whole 10/09/2015    PPD Test 01/14/2016    Pneumococcal Conjugate - 13 Valent 09/23/2015    Pneumococcal Polysaccharide - 23 Valent 03/28/2016, 11/09/2016    Tdap 09/23/2015    Zoster Recombinant 05/17/2021, 07/19/2021       Family History       Problem Relation (Age of Onset)    Cataracts Maternal Aunt    Diabetes Father, Maternal Grandfather    Heart disease Sister    Hypertension Mother    Kidney disease Father    No Known Problems Sister, Sister, Brother, Brother, Maternal Uncle, Paternal Aunt, Paternal Uncle, Maternal Grandmother, Paternal Grandmother, Paternal Grandfather, Son, Son, Other          Social History     Socioeconomic History    Marital status:     Number of children: 2   Occupational History    Occupation: [employer]     Employer: [id number]   Tobacco Use    Smoking status: Never     Passive exposure: Never    Smokeless tobacco: Never   Substance and Sexual Activity    Alcohol use: No    Drug use: Yes     Types: Hydrocodone    Sexual activity: Not Currently     Social Determinants of Health     Financial Resource Strain: Low Risk  (9/28/2023)    Overall Financial Resource Strain (CARDIA)     Difficulty of Paying Living Expenses: Not hard at all   Food Insecurity: No Food Insecurity (9/28/2023)    Hunger Vital Sign     Worried About Running Out of Food in the Last Year: Never true     Ran Out of Food in the Last Year:  Never true   Transportation Needs: No Transportation Needs (9/28/2023)    PRAPARE - Transportation     Lack of Transportation (Medical): No     Lack of Transportation (Non-Medical): No   Physical Activity: Inactive (9/28/2023)    Exercise Vital Sign     Days of Exercise per Week: 0 days     Minutes of Exercise per Session: 0 min   Stress: No Stress Concern Present (9/28/2023)    [place] [employer] - Occupational Stress Questionnaire     Feeling of Stress : Not at all   Social Connections: Moderately Integrated (9/28/2023)    Social Connection and Isolation Panel [NHANES]     Frequency of Communication with Friends and Family: More than three times a week     Frequency of Social Gatherings with Friends and Family: More than three times a week     Attends [employer] Services: More than 4 times per year     Active Member of Clubs or Organizations: No     Attends Club or Organization Meetings: Never     Marital Status:    Housing Stability: Low Risk  (9/28/2023)    Housing Stability Vital Sign     Unable to Pay for Housing in the Last Year: No     Number of Places Lived in the Last Year: 1     Unstable Housing in the Last Year: No     Review of Systems   Constitutional:  Positive for fatigue and unexpected weight change. Negative for chills and fever.   HENT:  Negative for congestion, dental problem, sore throat and trouble swallowing.    Eyes:  Negative for visual disturbance.   Respiratory:  Negative for cough and shortness of breath.    Cardiovascular:  Negative for chest pain and leg swelling.   Gastrointestinal:  Positive for diarrhea. Negative for abdominal distention, abdominal pain, blood in stool, nausea and vomiting.   Genitourinary:           +anuric   Musculoskeletal:  Negative for arthralgias and back pain.   Skin:  Positive for wound.   Neurological:  Negative for headaches.     Objective:     Vital Signs (Most Recent):  Temp: 96.9 °F (36.1 °C) (10/12/23 0815)  Pulse: 73  (10/12/23 0800)  Resp: 12 (10/12/23 0800)  BP: 124/79 (10/12/23 0800)  SpO2: 100 % (10/12/23 0800) Vital Signs (24h Range):  Temp:  [94.2 °F (34.6 °C)-96.9 °F (36.1 °C)] 96.9 °F (36.1 °C)  Pulse:  [73-84] 73  Resp:  [11-31] 12  SpO2:  [98 %-100 %] 100 %  BP: (102-136)/(63-96) 124/79     Weight: 40.8 kg (90 lb)  Body mass index is 15.45 kg/m².    Estimated Creatinine Clearance: 8 mL/min (A) (based on SCr of 4.9 mg/dL (H)).     Physical Exam  Vitals reviewed.   Constitutional:       General: She is not in acute distress.     Appearance: She is ill-appearing.      Comments: Frail    HENT:      Head: Normocephalic and atraumatic.   Eyes:      Conjunctiva/sclera: Conjunctivae normal.      Pupils: Pupils are equal, round, and reactive to light.   Cardiovascular:      Rate and Rhythm: Normal rate.      Heart sounds: Murmur heard.   Pulmonary:      Effort: Pulmonary effort is normal.      Breath sounds: Normal breath sounds.   Abdominal:      General: Abdomen is flat. There is no distension.      Palpations: Abdomen is soft.      Tenderness: There is no abdominal tenderness.      Comments: Greenish- brown liquid stool on diaper/ bed   Musculoskeletal:      Cervical back: Normal range of motion.      Right lower leg: No edema.      Left lower leg: No edema.   Lymphadenopathy:      Cervical: No cervical adenopathy.   Skin:     Coloration: Skin is not jaundiced.      Comments: Scab  on rt knee  Lt chest tunneled catheter  LUE- well healing site of AVG   Neurological:      Mental Status: She is alert and oriented to person, place, and time.      Comments: lethargic          Significant Labs: Blood Culture:   Recent Labs   Lab 07/19/23  1547 07/19/23  1602 08/25/23  1908 10/10/23  1201 10/10/23  1232   LABBLOO No Growth after 4 days. No Growth after 4 days. No Growth after 4 days. No Growth to date  No Growth to date No Growth to date  No Growth to date     Wound Culture:   Recent Labs   Lab 06/16/23  0930 07/20/23  0528  07/21/23  1300 07/21/23  1400 10/02/23  0749   LABAERO MORAXELLA (BRANHAMELLA) CATARRHALIS  Many  Beta Lactamase positive  * STAPHYLOCOCCUS EPIDERMIDIS  Many  * No growth  No growth  No growth  No growth ACHROMOBACTER XYLOSOXIDANS SUBSP. DENTRIFICANS  Many  *  STAPHYLOCOCCUS EPIDERMIDIS  Many  * No growth     All pertinent labs within the past 24 hours have been reviewed.    Significant Imaging: I have reviewed all pertinent imaging results/findings within the past 24 hours.

## 2023-10-12 NOTE — ASSESSMENT & PLAN NOTE
-BP solidly normal  -Home med list includes toprol, entresto and clonidine patch  -Holding clonidine for now as it can cause drowsiness  -Continue entresto and toprol

## 2023-10-12 NOTE — PROGRESS NOTES
Pharmacokinetic Assessment Follow Up: IV Vancomycin    Vancomycin serum concentration assessment(s):    The random level was drawn correctly and can be used to guide therapy at this time. The measurement is within the desired definitive target range of 10 to 20 mcg/mL.    Vancomycin Regimen Plan:    Give 500 mg after dialysis today.  Re-dose when the random level is less than 20 mcg/mL, next level to be drawn at 0400 on 10/14/2023    Drug levels (last 3 results):  Recent Labs   Lab Result Units 10/11/23  0457 10/12/23  0404   Vancomycin, Random ug/mL 13.4 13.3       Pharmacy will continue to follow and monitor vancomycin.    Please contact pharmacy at extension [phone] for questions regarding this assessment.    Thank you for the consult,   Eddie Brooks Jr       Patient brief summary:  Eav Bloom is a 58 y.o. female initiated on antimicrobial therapy with IV Vancomycin for treatment of sepsis    Drug Allergies:   Review of patient's allergies indicates:   Allergen Reactions    Coreg [carvedilol] Other (See Comments)     Nausea/vomiting    Allopurinol      Other reaction(s): abnormal transaminases       Actual Body Weight:   40.8 kg    Renal Function:   Estimated Creatinine Clearance: 8 mL/min (A) (based on SCr of 4.9 mg/dL (H)).,     Dialysis Method (if applicable):  intermittent HD    CBC (last 72 hours):  Recent Labs   Lab Result Units 10/10/23  1200 10/11/23  0457 10/12/23  0404   WBC K/uL 5.60 5.04 4.93   Hemoglobin g/dL 10.7* 9.2* 9.8*   Hematocrit % 36.0* 30.7* 32.2*   Platelets K/uL 210 153 150   Gran % % 68.3 69.1 67.9   Lymph % % 24.3 19.0 20.3   Mono % % 3.6* 6.9 5.3   Eosinophil % % 2.0 2.4 4.3   Basophil % % 1.4 2.0* 1.8   Differential Method  Automated Automated Automated       Metabolic Panel (last 72 hours):  Recent Labs   Lab Result Units 10/10/23  1320 10/11/23  0457 10/12/23  0404   Sodium mmol/L 139 138 139   Potassium mmol/L 3.7 3.6 3.5   Chloride mmol/L 104 102 103   CO2 mmol/L 18* 18*  16*   Glucose mg/dL 73 70 76   BUN mg/dL 19 26* 34*   Creatinine mg/dL 3.0* 3.6* 4.9*   Albumin g/dL 2.4* 2.3* 2.3*   Total Bilirubin mg/dL 1.0 1.0 1.0   Alkaline Phosphatase U/L 403* 373* 359*   AST U/L 106* 91* 81*   ALT U/L 37 38 36   Magnesium mg/dL 2.1 2.2 2.2   Phosphorus mg/dL 3.9 4.9* 6.2*       Vancomycin Administrations:  vancomycin given in the last 96 hours                     vancomycin 750 mg in dextrose 5 % (D5W) 250 mL IVPB (Vial-Mate) (mg) 750 mg New Bag 10/10/23 1353                    Microbiologic Results:  Microbiology Results (last 7 days)       Procedure Component Value Units Date/Time    Stool culture [[id number]] Collected: 10/11/23 1129    Order Status: Completed Specimen: Stool Updated: 10/12/23 0515     Stool Culture Nothing significant to date    Blood Culture #1 **CANNOT BE ORDERED STAT** [[id number]] Collected: 10/10/23 1201    Order Status: Completed Specimen: Blood from Peripheral, Forearm, Right Updated: 10/11/23 1303     Blood Culture, Routine No Growth to date      No Growth to date    Blood Culture #2 **CANNOT BE ORDERED STAT** [[id number]] Collected: 10/10/23 1232    Order Status: Completed Specimen: Blood from Peripheral, Forearm, Right Updated: 10/11/23 1303     Blood Culture, Routine No Growth to date      No Growth to date    Clostridium difficile EIA [[id number]] Collected: 10/11/23 0350    Order Status: Completed Specimen: Stool Updated: 10/11/23 1147     C. diff Antigen Negative     C difficile Toxins A+B, EIA Negative     Comment: Testing not recommended for children <24 months old.       E. coli 0157 antigen [[id number]] Collected: 10/11/23 1129    Order Status: No result Specimen: Stool Updated: 10/11/23 1146

## 2023-10-12 NOTE — ASSESSMENT & PLAN NOTE
-Echo 5/8/23 showed EF 23%, grade II diastolic dysfunction, mild AV stenosis and pulmonary hypertension with PASP 47mmHg  -AICD in place  -Strict ins/outs and daily weights  -Appears euvolemic to hypovolemic despite BNP > 4000 on admit  -Continue entresto and toprol  -HD for fluid removal

## 2023-10-12 NOTE — HPI
"Ms. Bloom is a 59y/o F with ESRD on HD TTS via [facility], HFp/rEF, PAD with toe wounds, metastatic RCC on immunotherapy since 2016 who presented for unintentional weight loss and was admitted for presumed sepsis.     Per chart, pt with unintentional weight loss over the last few weeks. Her appetite is poor but stable. No nausea, vomiting, dysphagia, abdominal pain noted. However, has been having diarrhea described as nonbloody liquid stools 3-4x/day over last week. Does not make urine. She is s/p recent L AVG excision, site is healing well with no drainage or erythema.  She does have L 3rd toe ulceration present. Was prescribed doxycycline after last appointment but never started it. Toe wound cx 10/2 neg.     In ED pt was hypothermic. Labs remarkable for no leukocytosis, elevated TSH (14.7), LA (5.3), BNP (>4,900).  Started on empiric vanc and zosyn. CT L foot w/o evidence of OM. Eval by podiatry and does not appear to be infected. CT chest revealed 2 new small pulm nodules and minimal area of GGO in RLL and small opacity AYE, which could be developing infection or aspiration.  Tested neg for cdiff.     ID consulted for: "Presented with sepsis/hypothermia - no clear infection identified.  Are further diagnostics warranted and/or antibiotics needed?"  Pt lethargic on exam and a poor historian. Only complains of fatigue and diarrhea.       "

## 2023-10-12 NOTE — PROGRESS NOTES
[facility] Medicine  Progress Note    Patient Name: Eva Bloom  MRN: [id number]  Patient Class: IP- Inpatient   Admission Date: 10/10/2023  Length of Stay: 2 days  Attending Physician: Leeroy Cheney MD  Primary Care Provider: Sowmya Mccain MD        Subjective:     Principal Problem:Severe sepsis        HPI:  Ms Eva Bloom is a 58 y.o. woman with ESRD on HD TTS via [facility]DC, systolic/diastolic CHF, PAD with toe wounds who presents for weight loss.  reports weight loss over last few weeks. Her appetite is poor but stable. No nausea, vomiting, dysphagia, abdominal pain. Reports diarrhea liquid stools 3-4x/day over last week. Does not make urine. She is s/p recent L AVG excision, site is healing well with no drainage or erythema. S/p L THDC placement, also working well with no issues. She does have L 3rd toe ulceration present, following with Podiatry. Was prescribed doxycycline after last appointment but never started it. Does have RCC following with Oncology on immunotherapy.     In ED, noted hypothermic and drowsy. Lactic elevated. Started warming blanket and broad antibiotics. Admitted to ICU.       Overview/Hospital Course:  Ms Eva Bloom was admitted to ICU with hypothermia, diarrhea, and lactic acidosis concerning for infection. Blood cultures NGTD. She does have L 3rd toe wound but does not appear infected. Does not make urine. L AVG excision site no infection. Cdiff negative, other stool studies are pending. Started broad antibiotics while awaiting further culture data. Nephrology consulted to continue dialysis.       Interval History: No acute events overnight.  Noted recurrent hypothermia this morning.  Under warming blanket during my visit.  Was alert and conversant - said she just felt miserable all over.  Looks quite ill.  All questions answered and patient had no further complaints.    Objective:     Vital Signs (Most Recent):  Temp: (!) 93.3 °F (34.1 °C)  (10/12/23 1206)  Pulse: (!) 127 (10/12/23 1230)  Resp: 14 (10/12/23 1206)  BP: 126/83 (10/12/23 1230)  SpO2: 98 % (10/12/23 1206) Vital Signs (24h Range):  Temp:  [93.3 °F (34.1 °C)-96.9 °F (36.1 °C)] 93.3 °F (34.1 °C)  Pulse:  [] 127  Resp:  [11-28] 14  SpO2:  [98 %-100 %] 98 %  BP: (102-136)/(63-93) 126/83     Weight: 40.8 kg (90 lb)  Body mass index is 15.45 kg/m².    Intake/Output Summary (Last 24 hours) at 10/12/2023 1529  Last data filed at 10/12/2023 0955  Gross per 24 hour   Intake 101.96 ml   Output --   Net 101.96 ml           Physical Exam  Vitals and nursing note reviewed.   Constitutional:       General: She is not in acute distress.     Appearance: She is ill-appearing and toxic-appearing. She is not diaphoretic.      Comments: Sleepy but awakens easily. cachexia   HENT:      Head: Normocephalic and atraumatic.      Nose: Nose normal.      Mouth/Throat:      Mouth: Mucous membranes are moist.   Eyes:      Extraocular Movements: Extraocular movements intact.   Cardiovascular:      Rate and Rhythm: Normal rate and regular rhythm.   Pulmonary:      Effort: Pulmonary effort is normal.      Breath sounds: Normal breath sounds.      Comments: Room air  Abdominal:      General: Bowel sounds are normal.      Palpations: Abdomen is soft.      Tenderness: There is no abdominal tenderness.   Musculoskeletal:      Cervical back: Normal range of motion.      Right lower leg: No edema.      Left lower leg: No edema.   Skin:     General: Skin is warm and dry.      Comments: L AVG site bandaged. L 3rd toe bandaged. L THDC in place   Neurological:      Mental Status: She is alert and oriented to person, place, and time.             Significant Labs: All pertinent labs within the past 24 hours have been reviewed.    Significant Imaging: I have reviewed all pertinent imaging results/findings within the past 24 hours.      Assessment/Plan:      * Severe sepsis  -Admitted to inpatient status  -On admit hypothermic  without tachycardia, leukocytosis but with lactic acidosis and mildly elevated procalcitonin  -CT chest/abd/pelvis- no new source of infection noted  -CT L foot shows no evidence for osteomyelitis  -Organ dysfunction indicated by Encephalopathy  -Blood cultures negative  -C.diff negative.  No fecal WBC. Giardia and crypto Ag negative.  Stool culture unremarkable.  -Note she is s/p AVG excision 9/28/23 for infection.   -Source of sepsis is unclear - has diarrhea and noted skin/soft tissue wound on foot  -Podiatry consulted to evaluate foot wounds- appears stable, no signs of cellulitis  -Unclear if this is actual sepsis or SIRS due to non-infectious etiology  -Check AM cortisol  -Consult infectious disease  -Continue warming blanket for hypothermia as needed  -Continue vancomycin and zosyn for now.    Hypothermia  -This is persistent despite broad spectrum antibiotics  -Unclear if due to sepsis or other cause  -Noted elevated TSH, but it is improved and she is on synthroid  -Check AM cortisol  -Continue warming blanket    Metastatic renal cell carcinoma to lung, unspecified laterality  -History noted  -Follows with Oncology, last seen 9/2023  -On cabozantinib at home   -Repeat CT to look for any progression in setting of worsening weight loss - showed Two new small pulmonary nodules, minimal area of ground-glass opacity in the right lower lobe with a small parenchymal opacity in the left upper lobe and possible small hypodensity in the pancreas  -hold home Rx for now   -Consulted palliative care    Severe protein-calorie malnutrition  -Body mass index is 15.45 kg/m².  -Noted unintentional weight loss  -Nutrition consulted  -Agree with trial of dronabinol    Weight loss  -Treatment as above    Body mass index (BMI) less than 19  -Treatment as above    PAF (paroxysmal atrial fibrillation)  -Patient has Paroxysmal (<7 days) atrial fibrillation which is controlled.   -Patient is currently in sinus rhythm.  -Continue  toprol and eliquis    Chronic combined systolic and diastolic heart failure  -Echo 5/8/23 showed EF 23%, grade II diastolic dysfunction, mild AV stenosis and pulmonary hypertension with PASP 47mmHg  -AICD in place  -Strict ins/outs and daily weights  -Appears euvolemic to hypovolemic despite BNP > 4000 on admit  -Continue entresto and toprol  -HD for fluid removal    ICD (implantable cardioverter-defibrillator) in place - SubQ  -Treatment as above.    Essential hypertension  -BP solidly normal  -Home med list includes toprol, entresto and clonidine patch  -Holding clonidine for now as it can cause drowsiness  -Continue entresto and toprol    Hypothyroidism  Lab Results   Component Value Date    TSH 14.474 (H) 10/10/2023   -TSH 14.4 with normal FT4  -In last 5 months TSH has ranged from 8-22 - most recently 22  -Continue levothyroxine  -Needs TSH rechecked outside of acute illness setting     ESRD (end stage renal disease) on dialysis  -Does HD  TTS via [facility]  -Nephrology consulted and input appreciated  -Wound care consult for LUE site    Anemia in ESRD (end-stage renal disease)  -On admit Hb 10.7 with MCV > 100  -Hb now 9.8  -No evidence of bleeding  -B12 and folate normal when last checked. TSAT appropriate  -EPO with HD  -Repeat cbc in AM    Hyperparathyroidism, secondary renal  Lab Results   Component Value Date    PTH >2,500.0 (H) 02/17/2023    CALCIUM 9.2 10/12/2023    PHOS 6.2 (H) 10/12/2023   -Continues on home lanthanum     ACP (advance care planning)  Advance Care Planning   Patient remains full code, but is hospice appropriate.  Palliative care consulted and input appreciated.  Continue active GOC discussions.      VTE Risk Mitigation (From admission, onward)         Ordered     apixaban tablet 2.5 mg  2 times daily         10/10/23 1646     Place PNIG hose  Until discontinued         10/10/23 1422     Place sequential compression device  Until discontinued         10/10/23 1422     Reason for No  Pharmacological VTE Prophylaxis  Once        Question:  Reasons:  Answer:  Already adequately anticoagulated on oral Anticoagulants    10/10/23 1422     IP VTE HIGH RISK PATIENT  Once         10/10/23 1422                Discharge Planning   MAVIS:      Code Status: Full Code   Is the patient medically ready for discharge?:     Reason for patient still in hospital (select all that apply): Treatment and PT / OT recommendations  Discharge Plan A: Home Health                  Leeroy Cheney MD  [facility] of [facility]

## 2023-10-12 NOTE — SUBJECTIVE & OBJECTIVE
Past Medical History:   Diagnosis Date    Anemia     Anticoagulant long-term use     Atrial fibrillation     Bronchitis 03/01/2017    Cancer 2016    kidney cancer    CHF (congestive heart failure), NYHA class II, chronic, systolic     CMV (cytomegalovirus) antibody positive     Encounter for blood transfusion     ESRD (end stage renal disease)     Essential hypertension 09/23/2015    H/O herpes simplex type 2 infection     Herpes simplex type 1 antibody positive     History of kidney cancer     s/p left nephrectomy 1/2016    Hyperparathyroidism, unspecified     Hyperuricemia without signs of inflammatory arthritis and tophaceous disease     Kidney stones     LGSIL (low grade squamous intraepithelial dysplasia)     Myocardiopathy 07/21/2017    Prediabetes     Proteinuria     Renal disorder     Thyroid nodule     Urate nephropathy        Past Surgical History:   Procedure Laterality Date    BREAST CYST EXCISION      COLONOSCOPY N/A 11/12/2015    COLONOSCOPY N/A 3/12/2021    Procedure: COLONOSCOPY;  Surgeon: Brendon Lanier MD;  Location: [facility];  Service: Endoscopy;  Laterality: N/A;  covid test 3/9, labs prior, prep instr mailed -ml    EXCISION OF ARTERIOVENOUS FISTULA Left 9/27/2023    Procedure: EXCISION, AV FISTULA;  Surgeon: FARIDEH Muñoz III, MD;  Location: [facility] OR [address] [place] [address];  Service: Vascular;  Laterality: Left;  LUE AV graft excision    INSERTION OF BIVENTRICULAR IMPLANTABLE CARDIOVERTER-DEFIBRILLATOR (ICD)  04/2021    INSERTION OF TUNNELED CENTRAL VENOUS CATHETER (CVC) WITH SUBCUTANEOUS PORT N/A 1/25/2023    Procedure: INSERTION, DUAL LUMEN CATHETER WITH PORT, WITH IMAGING GUIDANCE;  Surgeon: FARIDEH Muñoz III, MD;  [facility];  Service: Peripheral Vascular;  Laterality: N/A;  possinble permcath placment    NEPHRECTOMY-LAPAROSCOPIC Left 01/12/2016    PERCUTANEOUS TRANSLUMINAL ANGIOPLASTY OF ARTERIOVENOUS FISTULA Left 4/19/2023    Procedure: PTA, AV FISTULA;  Surgeon: FARIDEH Muñoz  III, MD;  Location: [facility] CATH LAB;  Service: Peripheral Vascular;  Laterality: Left;    PERITONEAL CATHETER INSERTION      Permacath insertion  01/12/2017    PLACEMENT OF ARTERIOVENOUS GRAFT  12/28/2022    Procedure: INSERTION, GRAFT, ARTERIOVENOUS;  Surgeon: FARIDEH Muñoz III, MD;  Location: [facility] OR [facility];  Service: Peripheral Vascular;;    REMOVAL, GRAFT, ARTERIOVENOUS, UPPER EXTREMITY Left 1/25/2023    Procedure: REMOVAL, GRAFT, ARTERIOVENOUS, UPPER EXTREMITY;  Surgeon: FARIDEH Muñoz III, MD;  [facility];  Service: Peripheral Vascular;  Laterality: Left;    REVISION OF ARTERIOVENOUS FISTULA Left 5/1/2023    Procedure: REVISION, AV FISTULA;  Surgeon: FARIDEH Muñoz III, MD;  [facility];  Service: Peripheral Vascular;  Laterality: Left;  LUE AVG revision    REVISION OF PROCEDURE INVOLVING ARTERIOVENOUS GRAFT Left 12/28/2022    Procedure: REVISION, PROCEDURE INVOLVING ARTERIOVENOUS GRAFT;  Surgeon: FARIDEH Muñoz III, MD;  [facility];  Service: Peripheral Vascular;  Laterality: Left;    REVISION OF PROCEDURE INVOLVING ARTERIOVENOUS GRAFT Left 7/21/2023    Procedure: LEFT ARM ARTERIOVENOUS GRAFT EXCISION  AND LIGATION;  Surgeon: Obi Werner MD;  Location: [facility] OR;  Service: Vascular;  Laterality: Left;  Left AVG excision and revision with interposition    SALPINGOOPHORECTOMY Right 2016    KJB---DAVINCI    TONSILLECTOMY      TUBAL LIGATION         Review of patient's allergies indicates:   Allergen Reactions    Coreg [carvedilol] Other (See Comments)     Nausea/vomiting    Allopurinol      Other reaction(s): abnormal transaminases       Medications:  Medications Prior to Admission   Medication Sig    apixaban (ELIQUIS) 2.5 mg Tab Take 1 tablet (2.5 mg total) by mouth 2 (two) times daily.    aspirin 81 MG Chew Take 1 tablet (81 mg total) by mouth once daily.    cabozantinib (CABOMETYX) 60 mg Tab TAKE ONE TABLET BY MOUTH ONCE DAILY AT THE SAME TIME ON  AN EMPTY STOMACH AT LEAST 1 HOUR BEFORE OR 2 HOURS AFTER EATING. AVOID GRAPEFRUIT PRODUCTS    cloNIDine 0.3 mg/24 hr td ptwk (CATAPRES) 0.3 mg/24 hr Place 1 patch onto the skin every 7 days.    cyclobenzaprine (FLEXERIL) 5 MG tablet TAKE 1 TABLET BY MOUTH DAILY AS NEEDED FOR MUSCLE SPASMS.    ENTRESTO 49-51 mg per tablet TAKE 1 TABLET BY MOUTH TWICE A DAY    fluticasone propionate (FLONASE) 50 mcg/actuation nasal spray 1 spray (50 mcg total) by Each Nostril route 2 (two) times daily as needed (ear fullness).    HYDROcodone-acetaminophen (NORCO) 5-325 mg per tablet Take 1 tablet by mouth every 6 (six) hours as needed for Pain.    levothyroxine (SYNTHROID) 75 MCG tablet Take 1 tablet (75 mcg total) by mouth once daily.    metoprolol succinate (TOPROL-XL) 100 MG 24 hr tablet Take 1/2 tablet (50mg) once daily    acetaminophen (TYLENOL) 500 MG tablet Take 500 mg by mouth every 6 (six) hours as needed for Pain.    epoetin david-epbx (RETACRIT INJ) Epoetin david - epbx (Retacrit)    hydrALAZINE (APRESOLINE) 100 MG tablet Take 1 tablet (100 mg total) by mouth every 12 (twelve) hours.    lanthanum (FOSRENOL) 1000 MG chewable tablet Take 1 tablet by mouth.    lidocaine-prilocaine (EMLA) cream APPLY ATLEAST 30 MINUTES BEFORE TREATMENT 3 TIMES A WEEK    mv,Ca,min-folic acid-vit K1 (ONE-A-DAY WOMEN'S 50 PLUS) 400-20 mcg Tab Take 1 tablet by mouth once daily.    naloxone (NARCAN) 1 mg/mL injection 2 mg (1 mg per nostril) by Nasal route as needed for opioid overdose; may repeat in 3 to 5 minutes if not effective. Call 911 (Patient not taking: Reported on 10/3/2023)     Antibiotics (From admission, onward)      Start     Stop Route Frequency Ordered    10/11/23 2100  mupirocin 2 % ointment         10/16/23 2059 Nasl 2 times daily 10/11/23 1728    10/11/23 0100  piperacillin-tazobactam (ZOSYN) 4.5 g in dextrose 5 % in water (D5W) 100 mL IVPB (MB+)         -- IV Every 12 hours (non-standard times) 10/10/23 1556    10/10/23 7380   vancomycin - pharmacy to dose  (vancomycin IVPB (PEDS and ADULTS))        See Hyperspace for full Linked Orders Report.    -- IV pharmacy to manage frequency 10/10/23 1245          Antifungals (From admission, onward)      None          Antivirals (From admission, onward)      None             Immunization History   Administered Date(s) Administered    COVID-19, MRNA, LN-S, PF (MODERNA FULL 0.5 ML DOSE) 01/15/2021, 02/11/2021, 11/11/2021    Hepatitis A, Adult 09/23/2015    Hepatitis B (recombinant) Adjuvanted, 2 dose 11/15/2022    Hepatitis B, Adult 09/23/2015, 03/12/2016, 07/22/2016, 09/23/2016, 02/14/2019    Influenza 09/06/2011, 10/09/2012, 09/14/2013, 10/09/2015, 09/19/2020    Influenza - Quadrivalent 09/13/2018, 09/24/2019, 09/19/2020    Influenza - Quadrivalent - PF *Preferred* (6 months and older) 09/14/2013, 10/04/2014, 10/09/2015, 10/01/2017, 09/19/2020, 09/30/2021, 10/15/2022    Influenza Split 09/06/2011, 10/09/2012, 10/09/2015    Influenza Whole 10/09/2015    PPD Test 01/14/2016    Pneumococcal Conjugate - 13 Valent 09/23/2015    Pneumococcal Polysaccharide - 23 Valent 03/28/2016, 11/09/2016    Tdap 09/23/2015    Zoster Recombinant 05/17/2021, 07/19/2021       Family History       Problem Relation (Age of Onset)    Cataracts Maternal Aunt    Diabetes Father, Maternal Grandfather    Heart disease Sister    Hypertension Mother    Kidney disease Father    No Known Problems Sister, Sister, Brother, Brother, Maternal Uncle, Paternal Aunt, Paternal Uncle, Maternal Grandmother, Paternal Grandmother, Paternal Grandfather, Son, Son, Other          Social History     Socioeconomic History    Marital status:     Number of children: 2   Occupational History    Occupation: [employer]     Employer: [id number]   Tobacco Use    Smoking status: Never     Passive exposure: Never    Smokeless tobacco: Never   Substance and Sexual Activity    Alcohol use: No    Drug use: Yes     Types: Hydrocodone    Sexual  activity: Not Currently     Social Determinants of Health     Financial Resource Strain: Low Risk  (9/28/2023)    Overall Financial Resource Strain (CARDIA)     Difficulty of Paying Living Expenses: Not hard at all   Food Insecurity: No Food Insecurity (9/28/2023)    Hunger Vital Sign     Worried About Running Out of Food in the Last Year: Never true     Ran Out of Food in the Last Year: Never true   Transportation Needs: No Transportation Needs (9/28/2023)    PRAPARE - Transportation     Lack of Transportation (Medical): No     Lack of Transportation (Non-Medical): No   Physical Activity: Inactive (9/28/2023)    Exercise Vital Sign     Days of Exercise per Week: 0 days     Minutes of Exercise per Session: 0 min   Stress: No Stress Concern Present (9/28/2023)    [place] [employer] - Occupational Stress Questionnaire     Feeling of Stress : Not at all   Social Connections: Moderately Integrated (9/28/2023)    Social Connection and Isolation Panel [NHANES]     Frequency of Communication with Friends and Family: More than three times a week     Frequency of Social Gatherings with Friends and Family: More than three times a week     Attends Orthodoxy Services: More than 4 times per year     Active Member of Clubs or Organizations: No     Attends Club or Organization Meetings: Never     Marital Status:    Housing Stability: Low Risk  (9/28/2023)    Housing Stability Vital Sign     Unable to Pay for Housing in the Last Year: No     Number of Places Lived in the Last Year: 1     Unstable Housing in the Last Year: No     Review of Systems   Constitutional:  Positive for fatigue and unexpected weight change. Negative for chills and fever.   HENT:  Negative for congestion, dental problem, sore throat and trouble swallowing.    Eyes:  Negative for visual disturbance.   Respiratory:  Negative for cough and shortness of breath.    Cardiovascular:  Negative for chest pain and leg swelling.    Gastrointestinal:  Positive for diarrhea. Negative for abdominal distention, abdominal pain, blood in stool, nausea and vomiting.   Genitourinary:           +anuric   Musculoskeletal:  Negative for arthralgias and back pain.   Skin:  Positive for wound.   Neurological:  Negative for headaches.     Objective:     Vital Signs (Most Recent):  Temp: 96.9 °F (36.1 °C) (10/12/23 0815)  Pulse: 73 (10/12/23 0800)  Resp: 12 (10/12/23 0800)  BP: 124/79 (10/12/23 0800)  SpO2: 100 % (10/12/23 0800) Vital Signs (24h Range):  Temp:  [94.2 °F (34.6 °C)-96.9 °F (36.1 °C)] 96.9 °F (36.1 °C)  Pulse:  [73-84] 73  Resp:  [11-31] 12  SpO2:  [98 %-100 %] 100 %  BP: (102-136)/(63-96) 124/79     Weight: 40.8 kg (90 lb)  Body mass index is 15.45 kg/m².    Estimated Creatinine Clearance: 8 mL/min (A) (based on SCr of 4.9 mg/dL (H)).     Physical Exam  Vitals reviewed.   Constitutional:       General: She is not in acute distress.     Appearance: She is ill-appearing.      Comments: Frail    HENT:      Head: Normocephalic and atraumatic.   Eyes:      Conjunctiva/sclera: Conjunctivae normal.      Pupils: Pupils are equal, round, and reactive to light.   Cardiovascular:      Rate and Rhythm: Normal rate.      Heart sounds: Murmur heard.   Pulmonary:      Effort: Pulmonary effort is normal.      Breath sounds: Normal breath sounds.   Abdominal:      General: Abdomen is flat. There is no distension.      Palpations: Abdomen is soft.      Tenderness: There is no abdominal tenderness.      Comments: Greenish- brown liquid stool on diaper/ bed   Musculoskeletal:      Cervical back: Normal range of motion.      Right lower leg: No edema.      Left lower leg: No edema.   Lymphadenopathy:      Cervical: No cervical adenopathy.   Skin:     Coloration: Skin is not jaundiced.      Comments: Scab  on rt knee  Lt chest tunneled catheter  LUE- well healing site of AVG   Neurological:      Mental Status: She is alert and oriented to person, place, and time.       Comments: lethargic          Significant Labs: Blood Culture:   Recent Labs   Lab 07/19/23  1547 07/19/23  1602 08/25/23  1908 10/10/23  1201 10/10/23  1232   LABBLOO No Growth after 4 days. No Growth after 4 days. No Growth after 4 days. No Growth to date  No Growth to date No Growth to date  No Growth to date     Wound Culture:   Recent Labs   Lab 06/16/23  0930 07/20/23  0528 07/21/23  1300 07/21/23  1400 10/02/23  0749   LABAERO MORAXELLA (BRANHAMELLA) CATARRHALIS  Many  Beta Lactamase positive  * STAPHYLOCOCCUS EPIDERMIDIS  Many  * No growth  No growth  No growth  No growth ACHROMOBACTER XYLOSOXIDANS SUBSP. DENTRIFICANS  Many  *  STAPHYLOCOCCUS EPIDERMIDIS  Many  * No growth     All pertinent labs within the past 24 hours have been reviewed.    Significant Imaging: I have reviewed all pertinent imaging results/findings within the past 24 hours.

## 2023-10-12 NOTE — ASSESSMENT & PLAN NOTE
-Does HD  TTS via [facility]DC  -Nephrology consulted and input appreciated  -Wound care consult for LUE site

## 2023-10-12 NOTE — SUBJECTIVE & OBJECTIVE
Interval History: No acute events overnight.  Noted recurrent hypothermia this morning.  Under warming blanket during my visit.  Was alert and conversant - said she just felt miserable all over.  Looks quite ill.  All questions answered and patient had no further complaints.    Objective:     Vital Signs (Most Recent):  Temp: (!) 93.3 °F (34.1 °C) (10/12/23 1206)  Pulse: (!) 127 (10/12/23 1230)  Resp: 14 (10/12/23 1206)  BP: 126/83 (10/12/23 1230)  SpO2: 98 % (10/12/23 1206) Vital Signs (24h Range):  Temp:  [93.3 °F (34.1 °C)-96.9 °F (36.1 °C)] 93.3 °F (34.1 °C)  Pulse:  [] 127  Resp:  [11-28] 14  SpO2:  [98 %-100 %] 98 %  BP: (102-136)/(63-93) 126/83     Weight: 40.8 kg (90 lb)  Body mass index is 15.45 kg/m².    Intake/Output Summary (Last 24 hours) at 10/12/2023 1529  Last data filed at 10/12/2023 0955  Gross per 24 hour   Intake 101.96 ml   Output --   Net 101.96 ml           Physical Exam  Vitals and nursing note reviewed.   Constitutional:       General: She is not in acute distress.     Appearance: She is ill-appearing and toxic-appearing. She is not diaphoretic.      Comments: Sleepy but awakens easily. cachexia   HENT:      Head: Normocephalic and atraumatic.      Nose: Nose normal.      Mouth/Throat:      Mouth: Mucous membranes are moist.   Eyes:      Extraocular Movements: Extraocular movements intact.   Cardiovascular:      Rate and Rhythm: Normal rate and regular rhythm.   Pulmonary:      Effort: Pulmonary effort is normal.      Breath sounds: Normal breath sounds.      Comments: Room air  Abdominal:      General: Bowel sounds are normal.      Palpations: Abdomen is soft.      Tenderness: There is no abdominal tenderness.   Musculoskeletal:      Cervical back: Normal range of motion.      Right lower leg: No edema.      Left lower leg: No edema.   Skin:     General: Skin is warm and dry.      Comments: L AVG site bandaged. L 3rd toe bandaged. L THDC in place   Neurological:      Mental Status:  She is alert and oriented to person, place, and time.             Significant Labs: All pertinent labs within the past 24 hours have been reviewed.    Significant Imaging: I have reviewed all pertinent imaging results/findings within the past 24 hours.

## 2023-10-12 NOTE — ASSESSMENT & PLAN NOTE
Lab Results   Component Value Date    TSH 14.474 (H) 10/10/2023   -TSH 14.4 with normal FT4  -In last 5 months TSH has ranged from 8-22 - most recently 22  -Continue levothyroxine  -Needs TSH rechecked outside of acute illness setting

## 2023-10-12 NOTE — ASSESSMENT & PLAN NOTE
Lab Results   Component Value Date    PTH >2,500.0 (H) 02/17/2023    CALCIUM 9.2 10/12/2023    PHOS 6.2 (H) 10/12/2023   -Continues on home lanthanum

## 2023-10-12 NOTE — NURSING
[facility] [facility] [place] 4 [place]      10/11/2023       Skin assessed on: Q Shift      [] No Pressure Injuries Present    []Prevention Measures Documented    [x] Yes LDA  for Pressure Injury Previously documented     [] Yes New Pressure Injury Discovered   [] LDA for New Pressure Injury Added      Attending RN:  Nancy Lakhani RN     Second RN:  FELIPE Tay

## 2023-10-12 NOTE — NURSING
[facility], [facility]  Nurses Note -- 4 Eyes      10/12/2023       Skin assessed on: Q Shift      [] No Pressure Injuries Present    []Prevention Measures Documented    [x] Yes LDA  for Pressure Injury Previously documented     [] Yes New Pressure Injury Discovered   [] LDA for New Pressure Injury Added      Attending RN:  Valerie Gross RN     Second RN:  FELIPE Neff

## 2023-10-12 NOTE — NURSING
24 Hour Midnight Nurse Orders review / No Omitted orders, labs / No duplicate orders / No Benzo's, Opioids, narcotics which are NOT in use and should be considered for DC.

## 2023-10-12 NOTE — ASSESSMENT & PLAN NOTE
-On admit Hb 10.7 with MCV > 100  -Hb now 9.8  -No evidence of bleeding  -B12 and folate normal when last checked. TSAT appropriate  -EPO with HD  -Repeat cbc in AM

## 2023-10-12 NOTE — NURSING
Received report from FELIPE Neff. Patient lying in bed resting, NAD noted. Safety Precautions maintained, Will Monitor.

## 2023-10-12 NOTE — PLAN OF CARE
Problem: Adult Inpatient Plan of Care  Goal: Plan of Care Review  Flowsheets (Taken 10/12/2023 1719)  Plan of Care Reviewed With:   patient   spouse     Problem: Infection  Goal: Absence of Infection Signs and Symptoms  Intervention: Prevent or Manage Infection  Flowsheets (Taken 10/12/2023 1719)  Infection Management: aseptic technique maintained     Problem: Skin Injury Risk Increased  Goal: Skin Health and Integrity  Intervention: Optimize Skin Protection  Flowsheets (Taken 10/12/2023 1719)  Pressure Reduction Techniques:   pressure points protected   weight shift assistance provided  Pressure Reduction Devices:   heel offloading device utilized   positioning supports utilized  Skin Protection:   adhesive use limited   skin sealant/moisture barrier applied  Head of Bed (HOB) Positioning: HOB elevated     Problem: Device-Related Complication Risk (Hemodialysis)  Goal: Safe, Effective Therapy Delivery  Intervention: Optimize Device Care and Function  Flowsheets (Taken 10/12/2023 1719)  Medication Review/Management: medications reviewed

## 2023-10-13 LAB
ALBUMIN SERPL BCP-MCNC: 2.2 G/DL (ref 3.5–5.2)
ALP SERPL-CCNC: 328 U/L (ref 55–135)
ALT SERPL W/O P-5'-P-CCNC: 36 U/L (ref 10–44)
ANION GAP SERPL CALC-SCNC: 13 MMOL/L (ref 8–16)
AST SERPL-CCNC: 71 U/L (ref 10–40)
BASOPHILS # BLD AUTO: 0.08 K/UL (ref 0–0.2)
BASOPHILS NFR BLD: 1.2 % (ref 0–1.9)
BILIRUB SERPL-MCNC: 0.7 MG/DL (ref 0.1–1)
BUN SERPL-MCNC: 16 MG/DL (ref 6–20)
CALCIUM SERPL-MCNC: 8.9 MG/DL (ref 8.7–10.5)
CHLORIDE SERPL-SCNC: 108 MMOL/L (ref 95–110)
CO2 SERPL-SCNC: 16 MMOL/L (ref 23–29)
CORTIS SERPL-MCNC: 11 UG/DL
CORTIS SERPL-MCNC: 11.5 UG/DL
CORTIS SERPL-MCNC: 15 UG/DL
CORTIS SERPL-MCNC: 17.2 UG/DL
CREAT SERPL-MCNC: 3.1 MG/DL (ref 0.5–1.4)
DIFFERENTIAL METHOD: ABNORMAL
EOSINOPHIL # BLD AUTO: 0.2 K/UL (ref 0–0.5)
EOSINOPHIL NFR BLD: 3.1 % (ref 0–8)
ERYTHROCYTE [DISTWIDTH] IN BLOOD BY AUTOMATED COUNT: 22.4 % (ref 11.5–14.5)
EST. GFR  (NO RACE VARIABLE): 17 ML/MIN/1.73 M^2
FERRITIN SERPL-MCNC: 1012 NG/ML (ref 20–300)
FOLATE SERPL-MCNC: 31.1 NG/ML (ref 4–24)
GLUCOSE SERPL-MCNC: 80 MG/DL (ref 70–110)
HCT VFR BLD AUTO: 30.4 % (ref 37–48.5)
HGB BLD-MCNC: 9.3 G/DL (ref 12–16)
IMM GRANULOCYTES # BLD AUTO: 0.03 K/UL (ref 0–0.04)
IMM GRANULOCYTES NFR BLD AUTO: 0.4 % (ref 0–0.5)
IRON SERPL-MCNC: 96 UG/DL (ref 30–160)
LACTATE SERPL-SCNC: 3 MMOL/L (ref 0.5–2.2)
LYMPHOCYTES # BLD AUTO: 0.9 K/UL (ref 1–4.8)
LYMPHOCYTES NFR BLD: 13.7 % (ref 18–48)
MAGNESIUM SERPL-MCNC: 2 MG/DL (ref 1.6–2.6)
MCH RBC QN AUTO: 33.5 PG (ref 27–31)
MCHC RBC AUTO-ENTMCNC: 30.6 G/DL (ref 32–36)
MCV RBC AUTO: 109 FL (ref 82–98)
MONOCYTES # BLD AUTO: 0.3 K/UL (ref 0.3–1)
MONOCYTES NFR BLD: 5 % (ref 4–15)
NEUTROPHILS # BLD AUTO: 5.2 K/UL (ref 1.8–7.7)
NEUTROPHILS NFR BLD: 76.6 % (ref 38–73)
NRBC BLD-RTO: 2 /100 WBC
PHOSPHATE SERPL-MCNC: 3.5 MG/DL (ref 2.7–4.5)
PLATELET # BLD AUTO: 142 K/UL (ref 150–450)
PMV BLD AUTO: 11.4 FL (ref 9.2–12.9)
POTASSIUM SERPL-SCNC: 3.6 MMOL/L (ref 3.5–5.1)
PROT SERPL-MCNC: 5.5 G/DL (ref 6–8.4)
RBC # BLD AUTO: 2.78 M/UL (ref 4–5.4)
SATURATED IRON: 43 % (ref 20–50)
SODIUM SERPL-SCNC: 137 MMOL/L (ref 136–145)
TOTAL IRON BINDING CAPACITY: 221 UG/DL (ref 250–450)
TRANSFERRIN SERPL-MCNC: 149 MG/DL (ref 200–375)
VANCOMYCIN SERPL-MCNC: 16.9 UG/ML
VIT B12 SERPL-MCNC: >2000 PG/ML (ref 210–950)
WBC # BLD AUTO: 6.79 K/UL (ref 3.9–12.7)

## 2023-10-13 PROCEDURE — 82607 VITAMIN B-12: CPT | Performed by: HOSPITALIST

## 2023-10-13 PROCEDURE — 80202 ASSAY OF VANCOMYCIN: CPT | Performed by: HOSPITALIST

## 2023-10-13 PROCEDURE — 99232 PR SUBSEQUENT HOSPITAL CARE,LEVL II: ICD-10-PCS | Mod: GW,HPC,, | Performed by: REGISTERED NURSE

## 2023-10-13 PROCEDURE — 36415 COLL VENOUS BLD VENIPUNCTURE: CPT | Performed by: HOSPITALIST

## 2023-10-13 PROCEDURE — 82533 TOTAL CORTISOL: CPT | Mod: 91 | Performed by: HOSPITALIST

## 2023-10-13 PROCEDURE — 99232 SBSQ HOSP IP/OBS MODERATE 35: CPT | Mod: GW,HPC,, | Performed by: REGISTERED NURSE

## 2023-10-13 PROCEDURE — 97165 OT EVAL LOW COMPLEX 30 MIN: CPT

## 2023-10-13 PROCEDURE — 97162 PT EVAL MOD COMPLEX 30 MIN: CPT

## 2023-10-13 PROCEDURE — 21400001 HC TELEMETRY ROOM

## 2023-10-13 PROCEDURE — A4216 STERILE WATER/SALINE, 10 ML: HCPCS | Performed by: HOSPITALIST

## 2023-10-13 PROCEDURE — 83735 ASSAY OF MAGNESIUM: CPT | Performed by: HOSPITALIST

## 2023-10-13 PROCEDURE — 85025 COMPLETE CBC W/AUTO DIFF WBC: CPT | Performed by: HOSPITALIST

## 2023-10-13 PROCEDURE — 97530 THERAPEUTIC ACTIVITIES: CPT

## 2023-10-13 PROCEDURE — 82024 ASSAY OF ACTH: CPT | Performed by: HOSPITALIST

## 2023-10-13 PROCEDURE — 83605 ASSAY OF LACTIC ACID: CPT | Performed by: HOSPITALIST

## 2023-10-13 PROCEDURE — 99233 PR SUBSEQUENT HOSPITAL CARE,LEVL III: ICD-10-PCS | Mod: GW,HPC,, | Performed by: INTERNAL MEDICINE

## 2023-10-13 PROCEDURE — 84466 ASSAY OF TRANSFERRIN: CPT | Performed by: HOSPITALIST

## 2023-10-13 PROCEDURE — 25000003 PHARM REV CODE 250: Performed by: INTERNAL MEDICINE

## 2023-10-13 PROCEDURE — 25000003 PHARM REV CODE 250: Performed by: HOSPITALIST

## 2023-10-13 PROCEDURE — 83540 ASSAY OF IRON: CPT | Performed by: HOSPITALIST

## 2023-10-13 PROCEDURE — 63600175 PHARM REV CODE 636 W HCPCS: Performed by: INTERNAL MEDICINE

## 2023-10-13 PROCEDURE — 82746 ASSAY OF FOLIC ACID SERUM: CPT | Performed by: HOSPITALIST

## 2023-10-13 PROCEDURE — 84100 ASSAY OF PHOSPHORUS: CPT | Performed by: HOSPITALIST

## 2023-10-13 PROCEDURE — 63600175 PHARM REV CODE 636 W HCPCS: Performed by: HOSPITALIST

## 2023-10-13 PROCEDURE — 80053 COMPREHEN METABOLIC PANEL: CPT | Performed by: HOSPITALIST

## 2023-10-13 PROCEDURE — 99233 SBSQ HOSP IP/OBS HIGH 50: CPT | Mod: GW,HPC,, | Performed by: INTERNAL MEDICINE

## 2023-10-13 PROCEDURE — 99497 ADVNCD CARE PLAN 30 MIN: CPT | Mod: GW,HPC,, | Performed by: REGISTERED NURSE

## 2023-10-13 PROCEDURE — 99497 PR ADVNCD CARE PLAN 30 MIN: ICD-10-PCS | Mod: GW,HPC,, | Performed by: REGISTERED NURSE

## 2023-10-13 PROCEDURE — 82728 ASSAY OF FERRITIN: CPT | Performed by: HOSPITALIST

## 2023-10-13 PROCEDURE — 99232 SBSQ HOSP IP/OBS MODERATE 35: CPT | Mod: GV,,, | Performed by: PODIATRIST

## 2023-10-13 RX ORDER — COSYNTROPIN 0.25 MG/ML
0.25 INJECTION, POWDER, FOR SOLUTION INTRAMUSCULAR; INTRAVENOUS ONCE
Status: COMPLETED | OUTPATIENT
Start: 2023-10-13 | End: 2023-10-13

## 2023-10-13 RX ADMIN — Medication 10 ML: at 02:10

## 2023-10-13 RX ADMIN — DRONABINOL 2.5 MG: 2.5 CAPSULE ORAL at 09:10

## 2023-10-13 RX ADMIN — APIXABAN 2.5 MG: 2.5 TABLET, FILM COATED ORAL at 08:10

## 2023-10-13 RX ADMIN — COSYNTROPIN 0.25 MG: 0.25 INJECTION, POWDER, LYOPHILIZED, FOR SOLUTION INTRAVENOUS at 11:10

## 2023-10-13 RX ADMIN — MUPIROCIN: 20 OINTMENT TOPICAL at 08:10

## 2023-10-13 RX ADMIN — METRONIDAZOLE 500 MG: 250 TABLET ORAL at 08:10

## 2023-10-13 RX ADMIN — APIXABAN 2.5 MG: 2.5 TABLET, FILM COATED ORAL at 09:10

## 2023-10-13 RX ADMIN — LACTOBACILLUS ACIDOPHILUS / LACTOBACILLUS BULGARICUS 1 EACH: 100 MILLION CFU STRENGTH GRANULES at 08:10

## 2023-10-13 RX ADMIN — LACTOBACILLUS ACIDOPHILUS / LACTOBACILLUS BULGARICUS 1 EACH: 100 MILLION CFU STRENGTH GRANULES at 09:10

## 2023-10-13 RX ADMIN — MUPIROCIN: 20 OINTMENT TOPICAL at 09:10

## 2023-10-13 RX ADMIN — NEPHROCAP 1 CAPSULE: 1 CAP ORAL at 08:10

## 2023-10-13 RX ADMIN — DRONABINOL 2.5 MG: 2.5 CAPSULE ORAL at 08:10

## 2023-10-13 RX ADMIN — CEFTRIAXONE 2 G: 2 INJECTION, POWDER, FOR SOLUTION INTRAMUSCULAR; INTRAVENOUS at 11:10

## 2023-10-13 RX ADMIN — SACUBITRIL AND VALSARTAN 2 TABLET: 24; 26 TABLET, FILM COATED ORAL at 08:10

## 2023-10-13 RX ADMIN — METRONIDAZOLE 500 MG: 250 TABLET ORAL at 09:10

## 2023-10-13 RX ADMIN — METOPROLOL SUCCINATE 50 MG: 50 TABLET, EXTENDED RELEASE ORAL at 08:10

## 2023-10-13 RX ADMIN — LEVOTHYROXINE SODIUM 75 MCG: 75 TABLET ORAL at 05:10

## 2023-10-13 RX ADMIN — SACUBITRIL AND VALSARTAN 2 TABLET: 24; 26 TABLET, FILM COATED ORAL at 09:10

## 2023-10-13 NOTE — ASSESSMENT & PLAN NOTE
10/13/203  - interval chart reviewed in detail   - met with pt at bedside; improved alertness and ability to answer questions compared to 10/11  - however, similar to previous admission when palliative was involved, pt is not overly participatory in GOC/ACP discussions on her own behalf; no family was at bedside today during attempted discussion so it was not an issue of others talking for her; when discussing generic questions or pt symptoms pt was alert with eyes open, preparing her napkin for lunch/sipping from cup, but when attempted to discuss goals, treatments, etc pt closed eyes as if falling asleep, but returned to alertness when discussing attempting to eat lunch   - pt with very flat affect, possibly depressed mood; difficulty to assess as pt would no answer questions regarding this either   - given lack of appetite and potential depression; further assessment of depressions/symptoms/sleep could make addition of mirtazapine in pt's medication regimen helpful if felt appropriate by primary team or outpatient oncology team   - recommended to patient outpatient palliative medicine [facility] clinic follow up in coordination with her hem/onc visits, for further GOC/ACP discussion when pt is hopefully feeling better and in less stressful outpatient setting and to establish care with palliative team in the event that pt were to not qualify for any form of current treatment in the future   - emotional support provided; allowed time for questions/concerns   - updated hospital primary     10/11/2023 - Consult   - consult received; interval chart reviewed in detail; discussed pt during ICU MDT rounds   - pt known to myself and palliative medicine team from previous admission   - met with patient and , Otis, at bedside; re-introduction to palliative medicine team and role in current care and admission   - pt very cachectic and lying in bed with minimal participation in discussion following orientation questions   -   provided update on recent history since last palliative visit, including decreased appetite in the past months with marked decrease last few weeks (see malnutrition)   - GOC for continued full treatment at this time and work up for possible cause of symptoms   - plan for continued GOC/ACP discussion through admission, including potential improvement of symptoms associated with lack of appetite given concern for pt's overall condition/decline potentially becoming a barrier to qualifying for desired treatments   - emotional support provided   - Allowed time for questions/concerns; all addressed; expressed availability of myself/palliative team for additional questions/concerns

## 2023-10-13 NOTE — NURSING
[facility], [facility]  Nurses Note -- 4 Eyes      10/13/2023       Skin assessed on: Q Shift      [] No Pressure Injuries Present    []Prevention Measures Documented    [x] Yes LDA  for Pressure Injury Previously documented     [] Yes New Pressure Injury Discovered   [] LDA for New Pressure Injury Added      Attending RN:  Valerie Gross RN     Second RN:  Tawanda Fink RN

## 2023-10-13 NOTE — PT/OT/SLP EVAL
Physical Therapy Evaluation    Patient Name:  Eva Bloom   MRN:  [id number]    Recommendations:     Discharge Recommendations: home health PT (with caregiver support)   Discharge Equipment Recommendations: wheelchair, bedside commode, bath bench   Barriers to discharge: None    Assessment:     Eva Bloom is a 58 y.o. female admitted with a medical diagnosis of Severe sepsis.  She presents with the following impairments/functional limitations: weakness, impaired endurance, impaired self care skills, impaired functional mobility, gait instability, impaired balance, decreased upper extremity function, decreased lower extremity function, pain, abnormal tone, decreased ROM, impaired skin.    Rehab Prognosis: Fair/Fair-; patient would benefit from acute skilled PT services to address these deficits and reach maximum level of function.    Recent Surgery: * No surgery found *      Plan:     During this hospitalization, patient to be seen 3 x/week to address the identified rehab impairments via gait training, therapeutic activities, therapeutic exercises, wheelchair management/training and progress toward the following goals:    Plan of Care Expires:  10/27/23    Subjective     Chief Complaint: weakness  Patient/Family Comments/goals: Pt agreeable to therapy.   Pain/Comfort:  Pain Rating 1:  (Pt c/o pain to B ankle and L foot with touch.)  Pain Addressed 1: Reposition, Distraction, Cessation of Activity      Living Environment:  Pt lives with spouse in a [facility] with no concerns at entry.   Prior to admission, patients level of function was mod I with household ambulation using RW.  Spouse was transporting pt to dialysis.  Equipment used at home: walker, rolling.  Upon discharge, patient will have assistance from family.  Pt has 2 sons, 1 is out of state.  Sister in law present in room, reported they try to always have someone with pt.     Objective:     Patient found left sidelying with peripheral IV, telemetry upon  PT entry to room.    General Precautions: Standard, fall (ESRD on HD TTS)  Orthopedic Precautions:N/A   Braces: N/A  Respiratory Status: Room air    Exams:  Cognitive Exam:  Patient is oriented to Person, Place, and Time.  Pt able to follow simple commands.   Gross Motor Coordination:  impaired  Postural Exam:  Patient presented with the following abnormalities:    -       No postural abnormalities identified  Sensation:    -       Intact  light/touch BLE  Skin Integrity/Edema:      -       Skin integrity: Per chart, pt with multiple altered skin integrity to sacrum, LUE, and L 3rd toe.  -       Edema: None noted BLE  BLE ROM: PROM WFL  BLE Strength: Pt unable to participate in MMT, grossly 3+/5.    Functional Mobility:  Pt was sleepy, however was agreeable to sit EOB and minimally participate in activities.  TUCKER present and was very supportive and encouraging to pt.  Pt required min-mod VC's to keep her eyes open.  Pt with poor appetite, offered pt a drink, however pt declined.  Pt very weak and soft spoken, unable to tolerate much activities at this time.  PT will continue to assess.  Bed Mobility:     Rolling Left: minimum assistance  Scooting: minimum assistance  Supine to Sit: minimum assistance  Sit to Supine: moderate assistance  Transfers:     Sit to Stand: minimum assistance and of 2 persons with no AD x2 trials   Gait: Pt ambulated ~6 ft along bedside with min A/B HHA.  Pt with narrow SIDNEY, decreased weight shifting, decreased foot clearance, and decreased step length.    Balance: Pt with fair static sit/stand balance.       AM-PAC 6 CLICK MOBILITY  Total Score:15       Treatment & Education:  BLE PROM/AAROM seated as tolerated: AP, LAQ, and hip flex    Balance Training  Static Standing:  Patient performed static standing on level surface  using  B HHA  with Minimal Assistance of 2 persons and moderate verbal cues for upright posture and standing tolerance x2 trials.       Pt/sister-in-law educated on acute  skilled PT services.  TUCKER understood the importance of repositioning in bed.  Pt will need encouragement to participate with therapy, family is very supportive.  Pt/sister verbalized understanding.     Patient left left sidelying, HOB elevated, pillow between LE, and B heels offloaded with all lines intact, call button in reach, and TUCKER/MENDEZ present.    GOALS:   Multidisciplinary Problems       Physical Therapy Goals          Problem: Physical Therapy    Goal Priority Disciplines Outcome Goal Variances Interventions   Physical Therapy Goal     PT, PT/OT Ongoing, Progressing     Description: Goals to be met by: 10/27/23     Patient will increase functional independence with mobility by performin. Supine to sit with Stand-by Assistance  2. Rolling to Left and Right with Stand-by Assistance  3. Sit to stand transfer with Stand-by Assistance using RW  4. Bed to chair transfer with Stand-by Assistance using Rolling Walker  5. Gait x50 feet with Stand-by Assistance using Rolling Walker   6. Wheelchair propulsion x50 feet with Stand-by Assistance using bilateral upper/lower extremities  7. Lower extremity exercise program x10 reps per handout, with SBA                         History:     Past Medical History:   Diagnosis Date    Anemia     Anticoagulant long-term use     Atrial fibrillation     Bronchitis 2017    Cancer 2016    kidney cancer    CHF (congestive heart failure), NYHA class II, chronic, systolic     CMV (cytomegalovirus) antibody positive     Encounter for blood transfusion     ESRD (end stage renal disease)     Essential hypertension 2015    H/O herpes simplex type 2 infection     Herpes simplex type 1 antibody positive     History of kidney cancer     s/p left nephrectomy 2016    Hyperparathyroidism, unspecified     Hyperuricemia without signs of inflammatory arthritis and tophaceous disease     Kidney stones     LGSIL (low grade squamous intraepithelial dysplasia)     Myocardiopathy  07/21/2017    Prediabetes     Proteinuria     Renal disorder     Thyroid nodule     Urate nephropathy        Past Surgical History:   Procedure Laterality Date    BREAST CYST EXCISION      COLONOSCOPY N/A 11/12/2015    COLONOSCOPY N/A 3/12/2021    Procedure: COLONOSCOPY;  Surgeon: Brendon Lanier MD;  Location: [facility] ENDO;  Service: Endoscopy;  Laterality: N/A;  covid test 3/9, labs prior, prep instr mailed -ml    EXCISION OF ARTERIOVENOUS FISTULA Left 9/27/2023    Procedure: EXCISION, AV FISTULA;  Surgeon: FARIDEH Muñoz III, MD;  [facility];  Service: Vascular;  Laterality: Left;  LUE AV graft excision    INSERTION OF BIVENTRICULAR IMPLANTABLE CARDIOVERTER-DEFIBRILLATOR (ICD)  04/2021    INSERTION OF TUNNELED CENTRAL VENOUS CATHETER (CVC) WITH SUBCUTANEOUS PORT N/A 1/25/2023    Procedure: INSERTION, DUAL LUMEN CATHETER WITH PORT, WITH IMAGING GUIDANCE;  Surgeon: FARIDEH Muñoz III, MD;  Location: [facility];  Service: Peripheral Vascular;  Laterality: N/A;  possinble permcath placment    NEPHRECTOMY-LAPAROSCOPIC Left 01/12/2016    PERCUTANEOUS TRANSLUMINAL ANGIOPLASTY OF ARTERIOVENOUS FISTULA Left 4/19/2023    Procedure: PTA, AV FISTULA;  Surgeon: FARIDEH Muñoz III, MD;  Location: [facility] CATH LAB;  Service: Peripheral Vascular;  Laterality: Left;    PERITONEAL CATHETER INSERTION      Permacath insertion  01/12/2017    PLACEMENT OF ARTERIOVENOUS GRAFT  12/28/2022    Procedure: INSERTION, GRAFT, ARTERIOVENOUS;  Surgeon: FARIDEH Muñoz III, MD;  Location: [facility];  Service: Peripheral Vascular;;    REMOVAL, GRAFT, ARTERIOVENOUS, UPPER EXTREMITY Left 1/25/2023    Procedure: REMOVAL, GRAFT, ARTERIOVENOUS, UPPER EXTREMITY;  Surgeon: FARIDEH Muñoz III, MD;  Location: [facility];  Service: Peripheral Vascular;  Laterality: Left;    REVISION OF ARTERIOVENOUS FISTULA Left 5/1/2023    Procedure: REVISION, AV FISTULA;  Surgeon: FARIDEH Muñoz III, MD;  Location: [facility];   Service: Peripheral Vascular;  Laterality: Left;  LUE AVG revision    REVISION OF PROCEDURE INVOLVING ARTERIOVENOUS GRAFT Left 12/28/2022    Procedure: REVISION, PROCEDURE INVOLVING ARTERIOVENOUS GRAFT;  Surgeon: FARIDEH Muñoz III, MD;  Location: [address];  Service: Peripheral Vascular;  Laterality: Left;    REVISION OF PROCEDURE INVOLVING ARTERIOVENOUS GRAFT Left 7/21/2023    Procedure: LEFT ARM ARTERIOVENOUS GRAFT EXCISION  AND LIGATION;  Surgeon: Obi Werner MD;  Location: [facility];  Service: Vascular;  Laterality: Left;  Left AVG excision and revision with interposition    SALPINGOOPHORECTOMY Right 2016    KJB---DAVINCI    TONSILLECTOMY      TUBAL LIGATION         Time Tracking:     PT Received On: 10/13/23  PT Start Time: 1550     PT Stop Time: 1615  PT Total Time (min): 25 min     Billable Minutes: Evaluation 15 min co-eval with OT and Therapeutic Activity 10 min      10/13/2023

## 2023-10-13 NOTE — PROGRESS NOTES
[facility] Medicine  Progress Note    Patient Name: Eva Bloom  MRN: [id number]  Patient Class: IP- Inpatient   Admission Date: 10/10/2023  Length of Stay: 3 days  Attending Physician: Leeroy Cheney MD  Primary Care Provider: Sowmya Mccain MD        Subjective:     Principal Problem:Severe sepsis        HPI:  Ms Eva Bloom is a 58 y.o. woman with ESRD on HD TTS via THDC, systolic/diastolic CHF, PAD with toe wounds who presents for weight loss.  reports weight loss over last few weeks. Her appetite is poor but stable. No nausea, vomiting, dysphagia, abdominal pain. Reports diarrhea liquid stools 3-4x/day over last week. Does not make urine. She is s/p recent L AVG excision, site is healing well with no drainage or erythema. S/p L THDC placement, also working well with no issues. She does have L 3rd toe ulceration present, following with Podiatry. Was prescribed doxycycline after last appointment but never started it. Does have RCC following with Oncology on immunotherapy.     In ED, noted hypothermic and drowsy. Lactic elevated. Started warming blanket and broad antibiotics. Admitted to ICU.       Overview/Hospital Course:  Ms Eva Bloom was admitted to ICU with hypothermia, diarrhea, and lactic acidosis concerning for infection. Blood cultures NGTD. She does have L 3rd toe wound but does not appear infected. Does not make urine. L AVG excision site no infection. Cdiff negative, other stool studies are pending. Started broad antibiotics while awaiting further culture data. Nephrology consulted to continue dialysis.       Interval History: No acute events overnight.  Remains euthermic.  Looks much more comfortable today, but very very weak.  Ate only a few bites of breakfast.  All questions answered and patient had no further complaints.    Objective:     Vital Signs (Most Recent):  Temp: 97.4 °F (36.3 °C) (10/13/23 1537)  Pulse: 82 (10/13/23 1537)  Resp: 16 (10/13/23  1537)  BP: 104/72 (10/13/23 1537)  SpO2: 100 % (10/13/23 1537) Vital Signs (24h Range):  Temp:  [97.3 °F (36.3 °C)-98.3 °F (36.8 °C)] 97.4 °F (36.3 °C)  Pulse:  [66-88] 82  Resp:  [14-18] 16  SpO2:  [98 %-100 %] 100 %  BP: (104-170)/(64-92) 104/72     Weight: 40 kg (88 lb 2.9 oz)  Body mass index is 15.14 kg/m².    Intake/Output Summary (Last 24 hours) at 10/13/2023 1610  Last data filed at 10/13/2023 1402  Gross per 24 hour   Intake 70 ml   Output 1000 ml   Net -930 ml           Physical Exam  Vitals and nursing note reviewed.   Constitutional:       General: She is not in acute distress.     Appearance: She is ill-appearing. She is not toxic-appearing or diaphoretic.      Comments: Sleepy but awakens easily. cachexia   HENT:      Head: Normocephalic and atraumatic.      Nose: Nose normal.      Mouth/Throat:      Mouth: Mucous membranes are moist.   Eyes:      Extraocular Movements: Extraocular movements intact.   Cardiovascular:      Rate and Rhythm: Normal rate and regular rhythm.   Pulmonary:      Effort: Pulmonary effort is normal.      Breath sounds: Normal breath sounds.      Comments: Room air  Abdominal:      General: Bowel sounds are normal.      Palpations: Abdomen is soft.      Tenderness: There is no abdominal tenderness.   Musculoskeletal:      Cervical back: Normal range of motion.      Right lower leg: No edema.      Left lower leg: No edema.   Skin:     General: Skin is warm and dry.      Comments: L AVG site bandaged. L 3rd toe bandaged. L THDC in place   Neurological:      Mental Status: She is alert and oriented to person, place, and time.             Significant Labs: All pertinent labs within the past 24 hours have been reviewed.    Significant Imaging: I have reviewed all pertinent imaging results/findings within the past 24 hours.        Assessment/Plan:      * Severe sepsis  -Admitted to inpatient status  -On admit hypothermic without tachycardia, leukocytosis but with lactic acidosis and  mildly elevated procalcitonin  -CT chest/abd/pelvis- no new source of infection noted  -CT L foot shows no evidence for osteomyelitis  -Organ dysfunction indicated by Encephalopathy  -Blood cultures negative  -C.diff negative.  No fecal WBC. Giardia and crypto Ag negative.  Stool culture unremarkable.  -Note she is s/p AVG excision 9/28/23 for infection.   -Source of sepsis is unclear - has diarrhea and noted skin/soft tissue wound on foot  -Podiatry consulted to evaluate foot wounds- appears stable, no signs of cellulitis - continue wound care  -Unclear if this is actual sepsis or SIRS due to non-infectious etiology  -Consulted ID and input appreciated  -Noted AM cortisol is borderline - did cosyntropin stim test today with peak response 17.2.  For her diminished body habitus this is a satisfactory response and I doubt adrenal insufficiency.  -Now euthermic - if needed can resume warming blanket for hypothermia   -Agree with ID recs to complete a 5 day course of ceftriaxone and flagy to cover for possible IA infection.  EOC 10/14    Hypothermia  -This is persistent despite broad spectrum antibiotics  -Unclear if due to sepsis or other cause  -Noted elevated TSH, but it is improved and she is on synthroid  -Doubt adrenal insufficiency  -Now euthermic  -Treatment as above.    Metastatic renal cell carcinoma to lung, unspecified laterality  -History noted  -Follows with Oncology, last seen 9/2023  -On cabozantinib at home   -Repeat CT to look for any progression in setting of worsening weight loss - showed Two new small pulmonary nodules, minimal area of ground-glass opacity in the right lower lobe with a small parenchymal opacity in the left upper lobe and possible small hypodensity in the pancreas  -Hold home Rx for now   -Consulted palliative care and input appreciated.  Discussed with Palliative care team today.    Severe protein-calorie malnutrition  -Body mass index is 15.14 kg/m².  -Noted unintentional weight  loss  -Nutrition consulted  -Agree with trial of dronabinol    Weight loss  -Treatment as above    Body mass index (BMI) less than 19  -Treatment as above    PAF (paroxysmal atrial fibrillation)  -Patient has Paroxysmal (<7 days) atrial fibrillation which is controlled.   -Patient is currently in sinus rhythm.  -Continue toprol and eliquis    Chronic combined systolic and diastolic heart failure  -Echo 5/8/23 showed EF 23%, grade II diastolic dysfunction, mild AV stenosis and pulmonary hypertension with PASP 47mmHg  -AICD in place  -Strict ins/outs and daily weights  -Appears euvolemic to hypovolemic despite BNP > 4000 on admit  -Continue entresto and toprol  -HD for fluid removal    ICD (implantable cardioverter-defibrillator) in place - SubQ  -Treatment as above.    Essential hypertension  -BP solidly normal  -Home med list includes toprol, entresto and clonidine patch  -Holding clonidine for now as it can cause drowsiness  -Continue entresto and toprol    Hypothyroidism  -TSH 14.4 with normal FT4  -In last 5 months TSH has ranged from 8-22 - most recently 22  -Continue levothyroxine  -Needs TSH rechecked outside of acute illness setting     ESRD (end stage renal disease) on dialysis  -Does HD  TTS via THDC  -Nephrology consulted and input appreciated  -Wound care consult for LUE site    Anemia in ESRD (end-stage renal disease)  -On admit Hb 10.7 with MCV > 100  -Hb now 9.3  -No evidence of bleeding  -B12 and folate normal when last checked. TSAT appropriate  -EPO with HD  -Repeat cbc in AM    Hyperparathyroidism, secondary renal  Lab Results   Component Value Date    PTH >2,500.0 (H) 02/17/2023    CALCIUM 8.9 10/13/2023    PHOS 3.5 10/13/2023   -Continues on home lanthanum     ACP (advance care planning)  Advance Care Planning   Patient remains full code, but is hospice appropriate.  Palliative care consulted and input appreciated.  Continue active GOC discussions.  -Remains full code      VTE Risk Mitigation  (From admission, onward)         Ordered     apixaban tablet 2.5 mg  2 times daily         10/10/23 1646     Place PING hose  Until discontinued         10/10/23 1422     Place sequential compression device  Until discontinued         10/10/23 1422     Reason for No Pharmacological VTE Prophylaxis  Once        Question:  Reasons:  Answer:  Already adequately anticoagulated on oral Anticoagulants    10/10/23 1422     IP VTE HIGH RISK PATIENT  Once         10/10/23 1422                Discharge Planning   MAVIS:      Code Status: Full Code   Is the patient medically ready for discharge?:     Reason for patient still in hospital (select all that apply): Treatment and PT / OT recommendations  Discharge Plan A: Home Health                  Leeroy Cheney MD  Department of Hospital Medicine   [facility]

## 2023-10-13 NOTE — SUBJECTIVE & OBJECTIVE
Interval History: No acute events overnight.  Remains euthermic.  Looks much more comfortable today, but very very weak.  Ate only a few bites of breakfast.  All questions answered and patient had no further complaints.    Objective:     Vital Signs (Most Recent):  Temp: 97.4 °F (36.3 °C) (10/13/23 1537)  Pulse: 82 (10/13/23 1537)  Resp: 16 (10/13/23 1537)  BP: 104/72 (10/13/23 1537)  SpO2: 100 % (10/13/23 1537) Vital Signs (24h Range):  Temp:  [97.3 °F (36.3 °C)-98.3 °F (36.8 °C)] 97.4 °F (36.3 °C)  Pulse:  [66-88] 82  Resp:  [14-18] 16  SpO2:  [98 %-100 %] 100 %  BP: (104-170)/(64-92) 104/72     Weight: 40 kg (88 lb 2.9 oz)  Body mass index is 15.14 kg/m².    Intake/Output Summary (Last 24 hours) at 10/13/2023 1610  Last data filed at 10/13/2023 1402  Gross per 24 hour   Intake 70 ml   Output 1000 ml   Net -930 ml           Physical Exam  Vitals and nursing note reviewed.   Constitutional:       General: She is not in acute distress.     Appearance: She is ill-appearing. She is not toxic-appearing or diaphoretic.      Comments: Sleepy but awakens easily. cachexia   HENT:      Head: Normocephalic and atraumatic.      Nose: Nose normal.      Mouth/Throat:      Mouth: Mucous membranes are moist.   Eyes:      Extraocular Movements: Extraocular movements intact.   Cardiovascular:      Rate and Rhythm: Normal rate and regular rhythm.   Pulmonary:      Effort: Pulmonary effort is normal.      Breath sounds: Normal breath sounds.      Comments: Room air  Abdominal:      General: Bowel sounds are normal.      Palpations: Abdomen is soft.      Tenderness: There is no abdominal tenderness.   Musculoskeletal:      Cervical back: Normal range of motion.      Right lower leg: No edema.      Left lower leg: No edema.   Skin:     General: Skin is warm and dry.      Comments: L AVG site bandaged. L 3rd toe bandaged. L THDC in place   Neurological:      Mental Status: She is alert and oriented to person, place, and time.              Significant Labs: All pertinent labs within the past 24 hours have been reviewed.    Significant Imaging: I have reviewed all pertinent imaging results/findings within the past 24 hours.

## 2023-10-13 NOTE — ASSESSMENT & PLAN NOTE
-History noted  -Follows with Oncology, last seen 9/2023  -On cabozantinib at home   -Repeat CT to look for any progression in setting of worsening weight loss - showed Two new small pulmonary nodules, minimal area of ground-glass opacity in the right lower lobe with a small parenchymal opacity in the left upper lobe and possible small hypodensity in the pancreas  -Hold home Rx for now   -Consulted palliative care and input appreciated.  Discussed with Palliative care team today.

## 2023-10-13 NOTE — ASSESSMENT & PLAN NOTE
"59y/o F with ESRD on HD TTS via THDC (s/p AVG excision), HFp/rEF, PAD with toe wounds, metastatic RCC on immunotherapy since 2016 who presented for unintentional weight loss and diarrhea x 1 wk and was admitted for presumed sepsis. Has been intermittently hypothermic since admit.    Has undergone extensive septic workup that has thus far been unrevealing:    CT L foot w/o evidence of OM. Eval by podiatry and does not appear to be infected. CT chest revealed 2 new small pulm nodules and minimal area of GGO in RLL and small opacity AYE, which could be developing infection or aspiration. However sats at goal on RA and pt without respi symptoms. Tested neg for cdiff. Stool neg for crypto/ giardia. BCx 10/10 NGTD.     ID consulted for: "Presented with sepsis/hypothermia - no clear infection identified.  Are further diagnostics warranted and/or antibiotics needed?"Pt lethargic on exam and a poor historian. Only complains of fatigue and diarrhea.     Unclear if elevated Lactic acid and hypothermia are due to infectious process vs other process adrenal insuf/ hypothyroidism/ malignancy/ dehydration? Appears to be clinically improving today.     Recommendations:  --complete a 5 day course ceftriaxone / flagyl to cover for possible IA infection and pna   --f/u stool cx and O+P    "

## 2023-10-13 NOTE — PT/OT/SLP EVAL
Occupational Therapy   Evaluation    Name: Eva Bloom  MRN: [id number]  Admitting Diagnosis: Severe sepsis  Recent Surgery: * No surgery found *      Recommendations:     Discharge Recommendations: home health OT (w/ caregiver support)  Discharge Equipment Recommendations:  wheelchair, bedside commode, bath bench  Barriers to discharge:  None    Assessment:     Eva Bloom is a 58 y.o. female with a medical diagnosis of Severe sepsis.  Performance deficits affecting function: weakness, impaired endurance, impaired self care skills, impaired functional mobility, gait instability, impaired balance, decreased upper extremity function, decreased lower extremity function, decreased coordination, decreased safety awareness, decreased ROM, impaired skin.      Pt grossly weak and withdrawn,  requiring encouragement for participation this date; supportive family at side. Pt appeared fatigues and closed her eyes intermittently, requiring min-mod cueing for maintaining alertness. Pt required min A x 2 persons for standing and taking a few steps along the bed. Pt will continue to benefit from skilled acute OT services to maximize functional capacity.     Rehab Prognosis: Good; patient would benefit from acute skilled OT services to address these deficits and reach maximum level of function.       Plan:     Patient to be seen  (2-3x/wk) to address the above listed problems via self-care/home management, therapeutic activities, therapeutic exercises  Plan of Care Expires: 10/27/23  Plan of Care Reviewed with: patient (sister-in-law)    Subjective     Chief Complaint: weakness  Patient/Family Comments/goals: required encouragement for standing a 2nd trial     Occupational Profile:  Living Environment: Pt lives with spouse in a [facility] with no concerns at entry.   Previous level of function: Patients level of function was mod I with household ambulation using RW.  Spouse was transporting pt to dialysis  Roles and Routines:  grandmother, wife, mother   Equipment Used at Home: walker, rolling  Assistance upon Discharge: 2 sons, sister-in-law, brother    Pain/Comfort:  Pain Rating 1:  (B ankle and L 3rd toe.)    Patients cultural, spiritual, Islam conflicts given the current situation: no    Objective:     Communicated with: nurse prior to session.  Patient found HOB elevated with peripheral IV, telemetry upon OT entry to room.    General Precautions: Standard, fall  Orthopedic Precautions: N/A  Braces: N/A  Respiratory Status: Room air    Occupational Performance:    Bed Mobility:    Patient completed Rolling/Turning to Left with  minimum assistance  Patient completed Scooting/Bridging with minimum assistance  Patient completed Supine to Sit with minimum assistance    Functional Mobility/Transfers:  Patient completed Sit <> Stand Transfer x 2 trials with minimum assistance and of 2 persons  with  hand-held assist   Functional Mobility: Pt was able to laterally step along bed w/ min A-HHA. Refer to PT note for details.     Activities of Daily Living:  Upper Body Dressing: minimum assistance for donning gown over back   Lower Body Dressing: dependence for donning socks for BLEs at bed level     Cognitive/Visual Perceptual:  Cognitive/Psychosocial Skills:     -       Oriented to: Person, Place, and Time   -       Follows Commands/attention:Follows two-step commands  -       Communication: clear/fluent  -       Memory: No Deficits noted  -       Safety awareness/insight to disability: impaired     Physical Exam:  Balance:    -       fait sitting/standing balance  Postural examination/scapula alignment:    -       No postural abnormalities identified  Skin integrity: Per chart, pt with multiple altered skin integrity to sacrum, LUE, and L 3rd toe.  Edema:  None noted  Sensation:    -       Intact  Upper Extremity Range of Motion:     -       Right Upper Extremity: WFL but limited shoulder flexion   -       Left Upper Extremity: WFL but  limited shoulder flexion   Upper Extremity Strength:    -       Right Upper Extremity: WFL  -       Left Upper Extremity: WFL   Strength:    -       Right Upper Extremity: WFL  -       Left Upper Extremity: WFL    AMPAC 6 Click ADL:  AMPA Total Score: 15    Treatment & Education:  -Initial eval complete.   -Pt educated on role of OT and POC.   -Pt educated on safe functional mobility and ADL performance.  -Family educated on importance of repositioning in bed to maintain skin integrity; pt refuses pressure relief boots in which pillows were used to float heels.   -Questions and concerns addressed within scope.     Patient left left sidelying, HOB elevated, pillow between LE, and B heels offloaded with all lines intact, call button in reach, and TUCKER/MENDEZ present     GOALS:   Multidisciplinary Problems       Occupational Therapy Goals          Problem: Occupational Therapy    Goal Priority Disciplines Outcome Interventions   Occupational Therapy Goal     OT, PT/OT Ongoing, Progressing    Description: Goals to be met by: 10/27/23     Patient will increase functional independence with ADLs by performing:    Grooming while standing at sink (w/ seated rest breaks) with Stand-by Assistance.  Toileting from toilet with Stand-by Assistance for hygiene and clothing management.   Sit to stand with Stand-by Assistance.   Step transfer with Stand-by Assistance  Toilet transfer to toilet with Stand-by Assistance.  Upper extremity exercise program x15 reps per handout, with independence.                         History:     Past Medical History:   Diagnosis Date    Anemia     Anticoagulant long-term use     Atrial fibrillation     Bronchitis 03/01/2017    Cancer 2016    kidney cancer    CHF (congestive heart failure), NYHA class II, chronic, systolic     CMV (cytomegalovirus) antibody positive     Encounter for blood transfusion     ESRD (end stage renal disease)     Essential hypertension 09/23/2015    H/O herpes simplex type  2 infection     Herpes simplex type 1 antibody positive     History of kidney cancer     s/p left nephrectomy 1/2016    Hyperparathyroidism, unspecified     Hyperuricemia without signs of inflammatory arthritis and tophaceous disease     Kidney stones     LGSIL (low grade squamous intraepithelial dysplasia)     Myocardiopathy 07/21/2017    Prediabetes     Proteinuria     Renal disorder     Thyroid nodule     Urate nephropathy          Past Surgical History:   Procedure Laterality Date    BREAST CYST EXCISION      COLONOSCOPY N/A 11/12/2015    COLONOSCOPY N/A 3/12/2021    Procedure: COLONOSCOPY;  Surgeon: Brendon Lnaier MD;  Location: [facility] ENDO;  Service: Endoscopy;  Laterality: N/A;  covid test 3/9, labs prior, prep instr mailed -ml    EXCISION OF ARTERIOVENOUS FISTULA Left 9/27/2023    Procedure: EXCISION, AV FISTULA;  Surgeon: FARIDEH Muñoz III, MD;  [facility];  Service: Vascular;  Laterality: Left;  LUE AV graft excision    INSERTION OF BIVENTRICULAR IMPLANTABLE CARDIOVERTER-DEFIBRILLATOR (ICD)  04/2021    INSERTION OF TUNNELED CENTRAL VENOUS CATHETER (CVC) WITH SUBCUTANEOUS PORT N/A 1/25/2023    Procedure: INSERTION, DUAL LUMEN CATHETER WITH PORT, WITH IMAGING GUIDANCE;  Surgeon: FARIDEH Muñoz III, MD;  [facility];  Service: Peripheral Vascular;  Laterality: N/A;  possinble permcath placment    NEPHRECTOMY-LAPAROSCOPIC Left 01/12/2016    PERCUTANEOUS TRANSLUMINAL ANGIOPLASTY OF ARTERIOVENOUS FISTULA Left 4/19/2023    Procedure: PTA, AV FISTULA;  Surgeon: FARIDEH Muñoz III, MD;  Location: [facility] CATH LAB;  Service: Peripheral Vascular;  Laterality: Left;    PERITONEAL CATHETER INSERTION      Permacath insertion  01/12/2017    PLACEMENT OF ARTERIOVENOUS GRAFT  12/28/2022    Procedure: INSERTION, GRAFT, ARTERIOVENOUS;  Surgeon: FARIDEH Muñoz III, MD;  [facility];  Service: Peripheral Vascular;;    REMOVAL, GRAFT, ARTERIOVENOUS, UPPER EXTREMITY Left 1/25/2023     Procedure: REMOVAL, GRAFT, ARTERIOVENOUS, UPPER EXTREMITY;  Surgeon: FARIDEH Muñoz III, MD;  [facility];  Service: Peripheral Vascular;  Laterality: Left;    REVISION OF ARTERIOVENOUS FISTULA Left 5/1/2023    Procedure: REVISION, AV FISTULA;  Surgeon: FARIDEH Muñoz III, MD;  [facility];  Service: Peripheral Vascular;  Laterality: Left;  LUE AVG revision    REVISION OF PROCEDURE INVOLVING ARTERIOVENOUS GRAFT Left 12/28/2022    Procedure: REVISION, PROCEDURE INVOLVING ARTERIOVENOUS GRAFT;  Surgeon: FARIDEH Muñoz III, MD;  [facility];  Service: Peripheral Vascular;  Laterality: Left;    REVISION OF PROCEDURE INVOLVING ARTERIOVENOUS GRAFT Left 7/21/2023    Procedure: LEFT ARM ARTERIOVENOUS GRAFT EXCISION  AND LIGATION;  Surgeon: Obi Werner MD;  Location: [facility];  Service: Vascular;  Laterality: Left;  Left AVG excision and revision with interposition    SALPINGOOPHORECTOMY Right 2016    KJB---DAVINCI    TONSILLECTOMY      TUBAL LIGATION         Time Tracking:     OT Date of Treatment: 10/13/23  OT Start Time: 1548  OT Stop Time: 1613  OT Total Time (min): 25 min    Billable Minutes:Evaluation 15  Therapeutic Activity 10  Total Time 25  (co-tx w/ PT)     10/13/2023

## 2023-10-13 NOTE — SUBJECTIVE & OBJECTIVE
Medications:  Continuous Infusions:  Scheduled Meds:   sodium chloride 0.9%   Intravenous Once    apixaban  2.5 mg Oral BID    cefTRIAXone (ROCEPHIN) IVPB  2 g Intravenous Q24H    droNABinol  2.5 mg Oral BID    epoetin david (PROCRIT) injection  4,000 Units Intravenous Every Tues, Thurs, Sat    lactobacillus acidophilus & bulgar  1 packet Oral BID    lanthanum  1,000 mg Oral TID WM    levothyroxine  75 mcg Oral Before breakfast    metoprolol succinate  50 mg Oral Daily    metroNIDAZOLE  500 mg Oral Q12H    mupirocin   Nasal BID    sacubitriL-valsartan  2 tablet Oral BID    sodium chloride 0.9%  10 mL Intravenous Q8H    vitamin renal formula (B-complex-vitamin c-folic acid)  1 capsule Oral Daily     PRN Meds:sodium chloride 0.9%, acetaminophen, dextrose 10%, dextrose 10%, glucagon (human recombinant), glucose, glucose, HYDROcodone-acetaminophen, loperamide, melatonin, naloxone, ondansetron, prochlorperazine, sodium chloride 0.9%    Objective:     Vital Signs (Most Recent):  Temp: 98.2 °F (36.8 °C) (10/13/23 0729)  Pulse: 85 (10/13/23 0729)  Resp: 16 (10/13/23 0729)  BP: 121/81 (10/13/23 0729)  SpO2: 99 % (10/13/23 0729) Vital Signs (24h Range):  Temp:  [97.3 °F (36.3 °C)-98.2 °F (36.8 °C)] 98.2 °F (36.8 °C)  Pulse:  [] 85  Resp:  [14-17] 16  SpO2:  [98 %-100 %] 99 %  BP: (108-170)/(64-92) 121/81     Weight: 40 kg (88 lb 2.9 oz)  Body mass index is 15.14 kg/m².     Physical Exam  Vitals and nursing note reviewed.   Constitutional:       Appearance: She is cachectic.      Comments: Frail, appears older than age, appears very weak    HENT:      Head: Atraumatic.      Comments: Bilateral temporal wasting   Pulmonary:      Effort: No respiratory distress.   Musculoskeletal:      Comments: Muscular atrophy to extremities    Neurological:      Mental Status: She is oriented to person, place, and time and easily aroused. She is lethargic.      Comments: Oriented; answered questions appropriately but minimally  participatory and quiet during visit    Psychiatric:         Mood and Affect: Affect is flat.       Advance Care Planning   Advance Directives:   Goals of Care: What is most important right now is to focus on symptom/pain control, extending life as long as possible, even it it means sacrificing quality, curative/life-prolongation (regardless of treatment burdens). Accordingly, we have decided that the best plan to meet the patient's goals includes continuing with treatment.     Significant Labs: All pertinent labs within the past 24 hours have been reviewed.  CBC:   Recent Labs   Lab 10/13/23  0403   WBC 6.79   HGB 9.3*   HCT 30.4*   *   *     BMP:  Recent Labs   Lab 10/13/23  0403   GLU 80      K 3.6      CO2 16*   BUN 16   CREATININE 3.1*   CALCIUM 8.9   MG 2.0     LFT:  Lab Results   Component Value Date    AST 71 (H) 10/13/2023     (H) 10/12/2006    ALKPHOS 328 (H) 10/13/2023    BILITOT 0.7 10/13/2023     Albumin:   Albumin   Date Value Ref Range Status   10/13/2023 2.2 (L) 3.5 - 5.2 g/dL Final     Protein:   Total Protein   Date Value Ref Range Status   10/13/2023 5.5 (L) 6.0 - 8.4 g/dL Final     Lactic acid:   Lab Results   Component Value Date    LACTATE 3.0 (H) 10/13/2023    LACTATE 4.2 (HH) 10/11/2023     Significant Imaging: I have reviewed all pertinent imaging results/findings within the past 24 hours.

## 2023-10-13 NOTE — NURSING
[facility]  Nurses Note -- 4 Eyes      10/13/2023       Skin assessed on: Q Shift      [] No Pressure Injuries Present    []Prevention Measures Documented    [x] Yes LDA  for Pressure Injury Previously documented     [] Yes New Pressure Injury Discovered   [] LDA for New Pressure Injury Added      Attending RN:  Valerie Gross RN     Second RN:  Tawanda Fink RN

## 2023-10-13 NOTE — ASSESSMENT & PLAN NOTE
-Admitted to inpatient status  -On admit hypothermic without tachycardia, leukocytosis but with lactic acidosis and mildly elevated procalcitonin  -CT chest/abd/pelvis- no new source of infection noted  -CT L foot shows no evidence for osteomyelitis  -Organ dysfunction indicated by Encephalopathy  -Blood cultures negative  -C.diff negative.  No fecal WBC. Giardia and crypto Ag negative.  Stool culture unremarkable.  -Note she is s/p AVG excision 9/28/23 for infection.   -Source of sepsis is unclear - has diarrhea and noted skin/soft tissue wound on foot  -Podiatry consulted to evaluate foot wounds- appears stable, no signs of cellulitis - continue wound care  -Unclear if this is actual sepsis or SIRS due to non-infectious etiology  -Consulted ID and input appreciated  -Noted AM cortisol is borderline - did cosyntropin stim test today with peak response 17.2.  For her diminished body habitus this is a satisfactory response and I doubt adrenal insufficiency.  -Now euthermic - if needed can resume warming blanket for hypothermia   -Agree with ID recs to complete a 5 day course of ceftriaxone and flagy to cover for possible IA infection.  EOC 10/14

## 2023-10-13 NOTE — PLAN OF CARE
Problem: Occupational Therapy  Goal: Occupational Therapy Goal  Description: Goals to be met by: 10/27/23     Patient will increase functional independence with ADLs by performing:    Grooming while standing at sink (w/ seated rest breaks) with Stand-by Assistance.  Toileting from toilet with Stand-by Assistance for hygiene and clothing management.   Sit to stand with Stand-by Assistance.   Step transfer with Stand-by Assistance  Toilet transfer to toilet with Stand-by Assistance.  Upper extremity exercise program x15 reps per handout, with independence.    Outcome: Ongoing, Progressing

## 2023-10-13 NOTE — PLAN OF CARE
Problem: Physical Therapy  Goal: Physical Therapy Goal  Description: Goals to be met by: 10/27/23     Patient will increase functional independence with mobility by performin. Supine to sit with Stand-by Assistance  2. Rolling to Left and Right with Stand-by Assistance  3. Sit to stand transfer with Stand-by Assistance using RW  4. Bed to chair transfer with Stand-by Assistance using Rolling Walker  5. Gait x50 feet with Stand-by Assistance using Rolling Walker   6. Wheelchair propulsion x50 feet with Stand-by Assistance using bilateral upper/lower extremities  7. Lower extremity exercise program x10 reps per handout, with SBA    Outcome: Ongoing, Progressing     Pt only ambulated a few sidesteps along bedside with min A of 2 persons.

## 2023-10-13 NOTE — PROGRESS NOTES
[place] [facility]  Palliative Medicine  Progress Note    Patient Name: Eva Bloom  MRN: [id number]  Admission Date: 10/10/2023  Hospital Length of Stay: 3 days  Code Status: Full Code   Attending Provider: Leeroy Cheney MD  Consulting Provider: Leila Munoz NP  Primary Care Physician: Sowmya Mccain MD  Principal Problem:Severe sepsis    Patient information was obtained from patient and primary team.      Assessment/Plan:   Advance Care Planning     Cardiac/Vascular  Chronic combined systolic and diastolic heart failure  - chronic history noted; EF 35% on most recent echo   - diastolic HF that does cause intermittent SOB at rest and affects exertion/ADLs at times per discussion with family on abilities prior to admit   - pt and  previously with fair insight into life-limiting condition trajectory   -  contributes to appropriateness for hospice if/when aligns with [facility]    Renal/  ESRD (end stage renal disease) on dialysis  - long term HD pt  - significant kidney disease in family with pt's son (in 30s) also on HD as discussed in previous admission  -  no complications with HD recently and wish to cont HD as long as pt is a candidate   - noted; continued management per nephrology and hospital primary     Oncology  Metastatic renal cell carcinoma to lung, unspecified laterality  - follows up with oncology as outpatient with recent visit 9/2023  - on cabozantinib (immunotherapy) as outpatient     Endocrine  Severe protein-calorie malnutrition  - pt with severe protein calorie malnutrition with >12% body weight loss in a little over 2 months (8/2023: weight was 103 lbs and is now 90lbs);  reports weight has been even lower recently (in 80s)   - current BMI: 15.48; albumin 2.3  -  reports increased lack of appetite in the past few months, especially correlated with symptoms which lead to admit   - pt correlates weight loss to lack of appetite and nausea   - expressed transparent  concern for pt's nutritional status and ability to tolerate/qualify for current treatments such as HD and onc. immunotherapy if she continues to have declines in weight and stamina   - contributes to qualification for hospice if/when aligns with [facility]       Palliative Care  ACP (advance care planning)  10/13/203  - interval chart reviewed in detail   - met with pt at bedside; improved alertness and ability to answer questions compared to 10/11  - however, similar to previous admission when palliative was involved, pt is not overly participatory in GOC/ACP discussions on her own behalf; no family was at bedside today during attempted discussion so it was not an issue of others talking for her; when discussing generic questions or pt symptoms pt was alert with eyes open, preparing her napkin for lunch/sipping from cup, but when attempted to discuss goals, treatments, etc pt closed eyes as if falling asleep, but returned to alertness when discussing attempting to eat lunch   - pt with very flat affect, possibly depressed mood; difficulty to assess as pt would no answer questions regarding this either   - given lack of appetite and potential depression; further assessment of depressions/symptoms/sleep could make addition of mirtazapine in pt's medication regimen helpful if felt appropriate by primary team or outpatient oncology team   - recommended to patient outpatient palliative medicine [facility] clinic follow up in coordination with her hem/onc visits, for further GOC/ACP discussion when pt is hopefully feeling better and in less stressful outpatient setting and to establish care with palliative team in the event that pt were to not qualify for any form of current treatment in the future   - emotional support provided; allowed time for questions/concerns   - updated hospital primary     10/11/2023 - Consult   - consult received; interval chart reviewed in detail; discussed pt during ICU MDT rounds   - pt known to myself and  palliative medicine team from previous admission   - met with patient and , Otis, at bedside; re-introduction to palliative medicine team and role in current care and admission   - pt very cachectic and lying in bed with minimal participation in discussion following orientation questions   -  provided update on recent history since last palliative visit, including decreased appetite in the past months with marked decrease last few weeks (see malnutrition)   - GOC for continued full treatment at this time and work up for possible cause of symptoms   - plan for continued GOC/ACP discussion through admission, including potential improvement of symptoms associated with lack of appetite given concern for pt's overall condition/decline potentially becoming a barrier to qualifying for desired treatments   - emotional support provided   - Allowed time for questions/concerns; all addressed; expressed availability of myself/palliative team for additional questions/concerns       I will follow-up with patient. Please contact us if you have any additional questions.     Total visit time: 55 minutes    35 min visit time including: face to face time in discussion of symptom assessment, and exploring options and burdens of offered treatments.  This also includes non-face to face time preparing to see the patient including chart review, obtaining and/or reviewing separately obtained history, documenting clinical information in the electronic or other health record, independently interpreting results and communicating results to the patient/family/caregiver, family discussions by phone if not able to be present, coordination of care with other specialists, and discharge planning.     20 min ACP time spent: goals of care, advanced care planning, emotional support, formulating and communicating prognosis, exploring burden/ benefit of various approaches of treatment.     Leila Munoz NP  Palliative  "Medicine  [facility] [place]      Subjective:     Chief Complaint:   Chief Complaint   Patient presents with    Weight Loss     Pt;s  reports pt is losing weight and sleeping a lot.  Decreased appetite since x6 days.  PT is a dialysis pt Tu-Th-Sat.         HPI:   From H&P; "Ms Eva Bloom is a 58 y.o. woman with ESRD on HD TTS via [facility], systolic/diastolic CHF, PAD with toe wounds who presents for weight loss.  reports weight loss over last few weeks. Her appetite is poor but stable. No nausea, vomiting, dysphagia, abdominal pain. Reports diarrhea liquid stools 3-4x/day over last week. Does not make urine. She is s/p recent L AVG excision, site is healing well with no drainage or erythema. S/p L THDC placement, also working well with no issues. She does have L 3rd toe ulceration present, following with Podiatry. Was prescribed doxycycline after last appointment but never started it. Does have RCC following with Oncology on immunotherapy.      In ED, noted hypothermic and drowsy. Lactic elevated. Started warming blanket and broad antibiotics. Admitted to ICU. "    Palliative medicine consulted for continuity of care, goals of care discussion, and advance care planning; for details of visit, see advance care planning section of plan.         Hospital Course:  No notes on file    Medications:  Continuous Infusions:  Scheduled Meds:   sodium chloride 0.9%   Intravenous Once    apixaban  2.5 mg Oral BID    cefTRIAXone (ROCEPHIN) IVPB  2 g Intravenous Q24H    droNABinol  2.5 mg Oral BID    epoetin david (PROCRIT) injection  4,000 Units Intravenous Every Tues, Thurs, Sat    lactobacillus acidophilus & bulgar  1 packet Oral BID    lanthanum  1,000 mg Oral TID WM    levothyroxine  75 mcg Oral Before breakfast    metoprolol succinate  50 mg Oral Daily    metroNIDAZOLE  500 mg Oral Q12H    mupirocin   Nasal BID    sacubitriL-valsartan  2 tablet Oral BID    sodium chloride 0.9%  10 " mL Intravenous Q8H    vitamin renal formula (B-complex-vitamin c-folic acid)  1 capsule Oral Daily     PRN Meds:sodium chloride 0.9%, acetaminophen, dextrose 10%, dextrose 10%, glucagon (human recombinant), glucose, glucose, HYDROcodone-acetaminophen, loperamide, melatonin, naloxone, ondansetron, prochlorperazine, sodium chloride 0.9%    Objective:     Vital Signs (Most Recent):  Temp: 98.2 °F (36.8 °C) (10/13/23 0729)  Pulse: 85 (10/13/23 0729)  Resp: 16 (10/13/23 0729)  BP: 121/81 (10/13/23 0729)  SpO2: 99 % (10/13/23 0729) Vital Signs (24h Range):  Temp:  [97.3 °F (36.3 °C)-98.2 °F (36.8 °C)] 98.2 °F (36.8 °C)  Pulse:  [] 85  Resp:  [14-17] 16  SpO2:  [98 %-100 %] 99 %  BP: (108-170)/(64-92) 121/81     Weight: 40 kg (88 lb 2.9 oz)  Body mass index is 15.14 kg/m².     Physical Exam  Vitals and nursing note reviewed.   Constitutional:       Appearance: She is cachectic.      Comments: Frail, appears older than age, appears very weak    HENT:      Head: Atraumatic.      Comments: Bilateral temporal wasting   Pulmonary:      Effort: No respiratory distress.   Musculoskeletal:      Comments: Muscular atrophy to extremities    Neurological:      Mental Status: She is oriented to person, place, and time and easily aroused. She is lethargic.      Comments: Oriented; answered questions appropriately but minimally participatory and quiet during visit    Psychiatric:         Mood and Affect: Affect is flat.       Advance Care Planning  Advance Directives:   Goals of Care: What is most important right now is to focus on symptom/pain control, extending life as long as possible, even it it means sacrificing quality, curative/life-prolongation (regardless of treatment burdens). Accordingly, we have decided that the best plan to meet the patient's goals includes continuing with treatment.     Significant Labs: All pertinent labs within the past 24 hours have been reviewed.  CBC:   Recent Labs   Lab 10/13/23  0403   WBC  6.79   HGB 9.3*   HCT 30.4*   *   *     BMP:  Recent Labs   Lab 10/13/23  0403   GLU 80      K 3.6      CO2 16*   BUN 16   CREATININE 3.1*   CALCIUM 8.9   MG 2.0     LFT:  Lab Results   Component Value Date    AST 71 (H) 10/13/2023     (H) 10/12/2006    ALKPHOS 328 (H) 10/13/2023    BILITOT 0.7 10/13/2023     Albumin:   Albumin   Date Value Ref Range Status   10/13/2023 2.2 (L) 3.5 - 5.2 g/dL Final     Protein:   Total Protein   Date Value Ref Range Status   10/13/2023 5.5 (L) 6.0 - 8.4 g/dL Final     Lactic acid:   Lab Results   Component Value Date    LACTATE 3.0 (H) 10/13/2023    LACTATE 4.2 (HH) 10/11/2023     Significant Imaging: I have reviewed all pertinent imaging results/findings within the past 24 hours.      Leila Munoz NP  Palliative Medicine  [facility] [place] [facility]

## 2023-10-13 NOTE — PROGRESS NOTES
VANCOMYCIN DOSING BY PHARMACY DISCONTINUATION NOTE    Eva Bloom is a 58 y.o. female who had been consulted for vancomycin dosing.    The pharmacy consult for vancomycin dosing has been discontinued.     Vancomycin Dosing by Pharmacy Consult will sign-off. Please reconsult if necessary. Thank you for allowing us to participate in this patient's care.     Irina Nuñez  [phone]

## 2023-10-13 NOTE — ASSESSMENT & PLAN NOTE
- pt with severe protein calorie malnutrition with >12% body weight loss in a little over 2 months (8/2023: weight was 103 lbs and is now 90lbs);  reports weight has been even lower recently (in 80s)   - current BMI: 15.48; albumin 2.3  -  reports increased lack of appetite in the past few months, especially correlated with symptoms which lead to admit   - pt correlates weight loss to lack of appetite and nausea   - expressed transparent concern for pt's nutritional status and ability to tolerate/qualify for current treatments such as HD and onc. immunotherapy if she continues to have declines in weight and stamina   - contributes to qualification for hospice if/when aligns with [facility]

## 2023-10-13 NOTE — ASSESSMENT & PLAN NOTE
-On admit Hb 10.7 with MCV > 100  -Hb now 9.3  -No evidence of bleeding  -B12 and folate normal when last checked. TSAT appropriate  -EPO with HD  -Repeat cbc in AM

## 2023-10-13 NOTE — PROGRESS NOTES
Pharmacokinetic Assessment Follow Up: IV Vancomycin    Vancomycin serum concentration assessment(s):    The random level was drawn correctly and can be used to guide therapy at this time. The measurement is within the desired definitive target range of 10 to 20 mcg/mL.    Vancomycin Regimen Plan:      Re-dose when the random level is less than 20 mcg/mL, next level to be drawn at 0400 on 10/14/23    Drug levels (last 3 results):  Recent Labs   Lab Result Units 10/11/23  0457 10/12/23  0404 10/13/23  0403   Vancomycin, Random ug/mL 13.4 13.3 16.9       Pharmacy will continue to follow and monitor vancomycin.    Please contact pharmacy at extension [phone] for questions regarding this assessment.    Thank you for the consult,   Miguel De Los Santos       Patient brief summary:  Eva Bloom is a 58 y.o. female initiated on antimicrobial therapy with IV Vancomycin for treatment of sepsis    The patient's current regimen is random pulse dosing    Drug Allergies:   Review of patient's allergies indicates:   Allergen Reactions    Coreg [carvedilol] Other (See Comments)     Nausea/vomiting    Allopurinol      Other reaction(s): abnormal transaminases       Actual Body Weight:   40.8 kg    Renal Function:   Estimated Creatinine Clearance: 8 mL/min (A) (based on SCr of 4.9 mg/dL (H)).,     Dialysis Method (if applicable):  intermittent HD    CBC (last 72 hours):  Recent Labs   Lab Result Units 10/10/23  1200 10/11/23  0457 10/12/23  0404 10/13/23  0403   WBC K/uL 5.60 5.04 4.93 6.79   Hemoglobin g/dL 10.7* 9.2* 9.8* 9.3*   Hematocrit % 36.0* 30.7* 32.2* 30.4*   Platelets K/uL 210 153 150 142*   Gran % % 68.3 69.1 67.9 76.6*   Lymph % % 24.3 19.0 20.3 13.7*   Mono % % 3.6* 6.9 5.3 5.0   Eosinophil % % 2.0 2.4 4.3 3.1   Basophil % % 1.4 2.0* 1.8 1.2   Differential Method  Automated Automated Automated Automated       Metabolic Panel (last 72 hours):  Recent Labs   Lab Result Units 10/10/23  1320 10/11/23  0457 10/12/23  0404    Sodium mmol/L 139 138 139   Potassium mmol/L 3.7 3.6 3.5   Chloride mmol/L 104 102 103   CO2 mmol/L 18* 18* 16*   Glucose mg/dL 73 70 76   BUN mg/dL 19 26* 34*   Creatinine mg/dL 3.0* 3.6* 4.9*   Albumin g/dL 2.4* 2.3* 2.3*   Total Bilirubin mg/dL 1.0 1.0 1.0   Alkaline Phosphatase U/L 403* 373* 359*   AST U/L 106* 91* 81*   ALT U/L 37 38 36   Magnesium mg/dL 2.1 2.2 2.2   Phosphorus mg/dL 3.9 4.9* 6.2*       Vancomycin Administrations:  vancomycin given in the last 96 hours                     vancomycin (VANCOCIN) 500 mg in dextrose 5 % in water (D5W) 100 mL IVPB (MB+) (mg) 500 mg New Bag 10/12/23 2241    vancomycin 750 mg in dextrose 5 % (D5W) 250 mL IVPB (Vial-Mate) (mg) 750 mg New Bag 10/10/23 1353                    Microbiologic Results:  Microbiology Results (last 7 days)       Procedure Component Value Units Date/Time    Blood Culture #1 **CANNOT BE ORDERED STAT** [[id number]] Collected: 10/10/23 1201    Order Status: Completed Specimen: Blood from Peripheral, Forearm, Right Updated: 10/12/23 1303     Blood Culture, Routine No Growth to date      No Growth to date      No Growth to date    Blood Culture #2 **CANNOT BE ORDERED STAT** [[id number]] Collected: 10/10/23 1232    Order Status: Completed Specimen: Blood from Peripheral, Forearm, Right Updated: 10/12/23 1303     Blood Culture, Routine No Growth to date      No Growth to date      No Growth to date    E. coli 0157 antigen [[id number]] Collected: 10/11/23 1129    Order Status: Completed Specimen: Stool Updated: 10/12/23 1206     Shiga Toxin 1 E.coli Negative     Shiga Toxin 2 E.coli Negative    Stool culture [[id number]] Collected: 10/11/23 1129    Order Status: Completed Specimen: Stool Updated: 10/12/23 0515     Stool Culture Nothing significant to date    Clostridium difficile EIA [[id number]] Collected: 10/11/23 0350    Order Status: Completed Specimen: Stool Updated: 10/11/23 1147     C. diff Antigen Negative     C difficile Toxins A+B, EIA  Negative     Comment: Testing not recommended for children <24 months old.

## 2023-10-13 NOTE — PROGRESS NOTES
[facility]  Podiatry  Progress Note    Patient Name: Eva Bloom  MRN: [id number]  Admission Date: 10/10/2023  Hospital Length of Stay: 3 days  Attending Physician: Leeroy Cheney MD  Primary Care Provider: Sowmya Mccain MD     Subjective:     History of Present Illness: Eva Bloom is a 58 y.o. female with  has a past medical history of Anemia, Anticoagulant long-term use, Atrial fibrillation, Bronchitis, Cancer, CHF (congestive heart failure), NYHA class II, chronic, systolic, CMV (cytomegalovirus) antibody positive, Encounter for blood transfusion, ESRD (end stage renal disease), Essential hypertension, H/O herpes simplex type 2 infection, Herpes simplex type 1 antibody positive, History of kidney cancer, Hyperparathyroidism, unspecified, Hyperuricemia without signs of inflammatory arthritis and tophaceous disease, Kidney stones, LGSIL (low grade squamous intraepithelial dysplasia), Myocardiopathy, Prediabetes, Proteinuria, Renal disorder, Thyroid nodule, and Urate nephropathy.  Consult to Podiatry for evaluation treatment of left foot infection.  Location:  3rd digit Onset of the symptoms was late June/early July 2023 when she sustained direct trauma secondary a chair rolling over the foot while barefoot.  Patient is familiar to Podiatry and was seen on previous admit.  She was being followed outpatient by my colleague Dr. Farooq was last seen on 10/02/2023 where he noted purulent drainage and place patient on doxycycline.  Patient also has known history of peripheral arterial occlusive disease and is followed by vascular surgery.  at bedside assisting with history     Interval History:   10/11/23: Patient seen bedside. No heel protector boots in place.     10/13/2023 Patient seen at bedside. Somnolent. No new pedal complaints.  Heel protector boots in place        Scheduled Meds:   sodium chloride 0.9%   Intravenous Once    apixaban  2.5 mg Oral BID    cefTRIAXone (ROCEPHIN) IVPB   2 g Intravenous Q24H    cosyntropin  0.25 mg Intravenous Once    droNABinol  2.5 mg Oral BID    epoetin david (PROCRIT) injection  4,000 Units Intravenous Every Tues, Thurs, Sat    lactobacillus acidophilus & bulgar  1 packet Oral BID    lanthanum  1,000 mg Oral TID WM    levothyroxine  75 mcg Oral Before breakfast    metoprolol succinate  50 mg Oral Daily    metroNIDAZOLE  500 mg Oral Q12H    mupirocin   Nasal BID    sacubitriL-valsartan  2 tablet Oral BID    sodium chloride 0.9%  10 mL Intravenous Q8H    vitamin renal formula (B-complex-vitamin c-folic acid)  1 capsule Oral Daily     Continuous Infusions:  PRN Meds:sodium chloride 0.9%, acetaminophen, dextrose 10%, dextrose 10%, glucagon (human recombinant), glucose, glucose, HYDROcodone-acetaminophen, loperamide, melatonin, naloxone, ondansetron, prochlorperazine, sodium chloride 0.9%    Review of Systems   Constitutional:  Positive for activity change, appetite change, fatigue and unexpected weight change. Negative for chills and fever.   Respiratory:  Negative for cough and shortness of breath.    Cardiovascular:  Positive for leg swelling. Negative for chest pain.   Gastrointestinal:  Negative for diarrhea, nausea and vomiting.   Musculoskeletal:  Positive for arthralgias and myalgias.   Skin:  Positive for color change and wound.   Neurological:  Negative for weakness.        + paresthesia      Objective:     Vital Signs (Most Recent):  Temp: 98.2 °F (36.8 °C) (10/13/23 0729)  Pulse: 85 (10/13/23 0729)  Resp: 16 (10/13/23 0729)  BP: 121/81 (10/13/23 0729)  SpO2: 99 % (10/13/23 0729) Vital Signs (24h Range):  Temp:  [93.3 °F (34.1 °C)-98.2 °F (36.8 °C)] 98.2 °F (36.8 °C)  Pulse:  [] 85  Resp:  [14-17] 16  SpO2:  [98 %-100 %] 99 %  BP: (108-170)/(64-92) 121/81     Weight: 40 kg (88 lb 2.9 oz)  Body mass index is 15.14 kg/m².      Physical Exam  Constitutional:       General: She is awake. She is not in acute distress.     Appearance: She is underweight.  "  Cardiovascular:      Comments: Dorsalis pedis and posterior tibial pulses are diminished Bilaterally. Toes are cool to touch. Feet are warm proximally.There is decreased digital hair. Skin is atrophic, slightly hyperpigmented, and feet are mildly edematous      Musculoskeletal:      Comments: Decreased first MPJ range of motion     Patient has hammertoes of digits 2-5 bilateral partially reducible     Fat pad atrophy to heels and met heads bilateral   Skin:     Findings: Wound (see below) present.   Neurological:      Mental Status: She is alert.      Sensory: No sensory deficit.   Psychiatric:         Behavior: Behavior is cooperative.        10/13/2023                  10/10/11/23:  Stable eschar left 3rd toe.             10/10/2023    Left  Unstageable lesion to the dorsal left 3rd digit with local edema and erythema and tender to palpation but without fluctuance or crepitus or drainage.      Area of pressure to the plantar left heel      Posterior left Achilles primarily fibrinous to the level of subcutaneous tissue            Laboratory:  A1C:   Recent Labs   Lab 08/25/23  1704   HGBA1C 4.1     CBC:   Recent Labs   Lab 10/13/23  0403   WBC 6.79   RBC 2.78*   HGB 9.3*   HCT 30.4*   *   *   MCH 33.5*   MCHC 30.6*     CMP:   Recent Labs   Lab 10/13/23  0403   GLU 80   CALCIUM 8.9   ALBUMIN 2.2*   PROT 5.5*      K 3.6   CO2 16*      BUN 16   CREATININE 3.1*   ALKPHOS 328*   ALT 36   AST 71*   BILITOT 0.7     CRP: No results for input(s): "CRP" in the last 168 hours.  ESR: No results for input(s): "SEDRATE" in the last 168 hours.  Wound Cultures:   Recent Labs   Lab 06/16/23  0930 07/20/23  0528 07/21/23  1300 07/21/23  1400 10/02/23  0749   LABAERO MORAXELLA (BRANHAMELLA) CATARRHALIS  Many  Beta Lactamase positive  * STAPHYLOCOCCUS EPIDERMIDIS  Many  * No growth  No growth  No growth  No growth ACHROMOBACTER XYLOSOXIDANS SUBSP. DENTRIFICANS  Many  *  STAPHYLOCOCCUS " EPIDERMIDIS  Many  * No growth       Diagnostic Results:  Narrative & Impression  EXAMINATION:  CT FOOT WITH CONTRAST LEFT     CLINICAL HISTORY:  toe wound - ?osteo?  Has AICD so can't do MRI here.;     TECHNIQUE:  CT left foot performed 75 mL Omnipaque 350 intravenous contrast.     COMPARISON:  10/10/2023     FINDINGS:  There is no fracture or dislocation.  There is no osseous erosion or periosteal reaction.  There is no lytic or blastic lesion.     There is extensive arterial calcification throughout the foot.  No evidence for discrete fluid collection.  Note made of Achilles enthesopathy and plantar calcaneal spur.     Impression:     1. No evidence for osteomyelitis.  If there is continued clinical concern, three-phase nuclear medicine bone scan may be helpful.  2. Extensive vascular calcification.        Electronically signed by: Rosalio Palma MD    Assessment/Plan:     Active Diagnoses:    Diagnosis Date Noted POA    PRINCIPAL PROBLEM:  Severe sepsis [A41.9, R65.20] 10/29/2022 Yes    Hypothermia [T68.XXXA] 10/12/2023 Yes    Severe protein-calorie malnutrition [E43] 10/11/2023 Yes    ACP (advance care planning) [Z71.89] 10/11/2023 Not Applicable    Weight loss [R63.4] 10/10/2023 Yes    Body mass index (BMI) less than 19 [Z68.1] 09/28/2023 Not Applicable    Chronic combined systolic and diastolic heart failure [I50.42] 08/25/2023 Yes    Metastatic renal cell carcinoma to lung, unspecified laterality [C78.00, C64.9] 08/11/2023 Yes    Hypothyroidism [E03.9] 07/19/2023 Yes     Chronic    PAF (paroxysmal atrial fibrillation) [I48.0] 07/25/2022 Yes     Chronic    ICD (implantable cardioverter-defibrillator) in place - SubQ [Z95.810] 07/15/2021 Yes    Anemia in ESRD (end-stage renal disease) [N18.6, D63.1]  Yes     Chronic    ESRD (end stage renal disease) on dialysis [N18.6, Z99.2]  Not Applicable     Chronic    Essential hypertension [I10] 09/23/2015 Yes     Chronic    Hyperparathyroidism, secondary renal [N25.81]   Yes      Problems Resolved During this Admission:       Education about the prevention of limb loss.    CT reviewed, no bone involvement or gas in the soft tissues noted.     Wound cultures obtained by my colleague on October 2nd no growth to date.    Wound to the left 3rd digit is unstageable but stable in appearance.       Vashe wet to dry to the digit to try to soften the eschar and perhaps allow for outpatient debridement in the future, nursing wound care orders in place.  Nursing instructed to ensure patient has heel protector boots on given the areas of pressure already present to the left lower extremity that we do not want to regress during admission.    On discharge follow with her outpatient podiatrist, Dr. Basim Farooq.  Betadine paint to digit twice daily at home on discharge    Podiatry will sign off, feel free to contact us as needed      Ginger Mandel DPM  Podiatry  [facility] - [place] - Telemetry

## 2023-10-13 NOTE — ASSESSMENT & PLAN NOTE
Lab Results   Component Value Date    PTH >2,500.0 (H) 02/17/2023    CALCIUM 8.9 10/13/2023    PHOS 3.5 10/13/2023   -Continues on home lanthanum

## 2023-10-13 NOTE — PLAN OF CARE
Problem: Adult Inpatient Plan of Care  Goal: Plan of Care Review  Flowsheets (Taken 10/13/2023 1818)  Plan of Care Reviewed With: patient  Goal: Optimal Comfort and Wellbeing  Intervention: Monitor Pain and Promote Comfort  Flowsheets (Taken 10/13/2023 1818)  Pain Management Interventions:   pillow support provided   position adjusted  Intervention: Provide Person-Centered Care  Flowsheets (Taken 10/13/2023 1818)  Trust Relationship/Rapport:   care explained   thoughts/feelings acknowledged   questions answered     Problem: Coping Ineffective  Goal: Effective Coping  Intervention: Support and Enhance Coping Strategies  Flowsheets (Taken 10/13/2023 1818)  Supportive Measures: active listening utilized  Family/Support System Care: support provided  Environmental Support:   calm environment promoted   rest periods encouraged     Problem: Adjustment to Illness (Sepsis/Septic Shock)  Goal: Optimal Coping  Intervention: Optimize Psychosocial Adjustment to Illness  Flowsheets (Taken 10/13/2023 1818)  Supportive Measures: active listening utilized  Family/Support System Care: support provided     Problem: Skin Injury Risk Increased  Goal: Skin Health and Integrity  Intervention: Optimize Skin Protection  Flowsheets (Taken 10/13/2023 1818)  Pressure Reduction Techniques:   pressure points protected   positioned off wounds   weight shift assistance provided  Pressure Reduction Devices: positioning supports utilized  Head of Bed (HOB) Positioning: HOB elevated

## 2023-10-13 NOTE — ASSESSMENT & PLAN NOTE
- long term HD pt  - significant kidney disease in family with pt's son (in 30s) also on HD as discussed in previous admission  -  no complications with HD recently and wish to cont HD as long as pt is a candidate   - noted; continued management per nephrology and hospital primary

## 2023-10-13 NOTE — PLAN OF CARE
Problem: Adult Inpatient Plan of Care  Goal: Plan of Care Review  Outcome: Ongoing, Progressing  Goal: Patient-Specific Goal (Individualized)  Outcome: Ongoing, Progressing  Goal: Absence of Hospital-Acquired Illness or Injury  Outcome: Ongoing, Progressing  Goal: Optimal Comfort and Wellbeing  Outcome: Ongoing, Progressing  Goal: Readiness for Transition of Care  Outcome: Ongoing, Progressing     Problem: Coping Ineffective  Goal: Effective Coping  Outcome: Ongoing, Progressing     Problem: Infection  Goal: Absence of Infection Signs and Symptoms  Outcome: Ongoing, Progressing     Problem: Adjustment to Illness (Sepsis/Septic Shock)  Goal: Optimal Coping  Outcome: Ongoing, Progressing     Problem: Bleeding (Sepsis/Septic Shock)  Goal: Absence of Bleeding  Outcome: Ongoing, Progressing     Problem: Glycemic Control Impaired (Sepsis/Septic Shock)  Goal: Blood Glucose Level Within Desired Range  Outcome: Ongoing, Progressing     Problem: Infection Progression (Sepsis/Septic Shock)  Goal: Absence of Infection Signs and Symptoms  Outcome: Ongoing, Progressing     Problem: Nutrition Impaired (Sepsis/Septic Shock)  Goal: Optimal Nutrition Intake  Outcome: Ongoing, Progressing     Problem: Impaired Wound Healing  Goal: Optimal Wound Healing  Outcome: Ongoing, Progressing     Problem: Skin Injury Risk Increased  Goal: Skin Health and Integrity  Outcome: Ongoing, Progressing     Problem: Device-Related Complication Risk (Hemodialysis)  Goal: Safe, Effective Therapy Delivery  Outcome: Ongoing, Progressing     Problem: Hemodynamic Instability (Hemodialysis)  Goal: Effective Tissue Perfusion  Outcome: Ongoing, Progressing     Problem: Infection (Hemodialysis)  Goal: Absence of Infection Signs and Symptoms  Outcome: Ongoing, Progressing

## 2023-10-13 NOTE — ASSESSMENT & PLAN NOTE
-This is persistent despite broad spectrum antibiotics  -Unclear if due to sepsis or other cause  -Noted elevated TSH, but it is improved and she is on synthroid  -Doubt adrenal insufficiency  -Now euthermic  -Treatment as above.

## 2023-10-13 NOTE — SUBJECTIVE & OBJECTIVE
Interval History: Normothermic this morning and more alert and interactive on exam. Notes feeling a little better. No new complaints reported.    Review of Systems   Constitutional:  Positive for fatigue and unexpected weight change. Negative for chills and fever.   HENT:  Negative for congestion, dental problem, sore throat and trouble swallowing.    Eyes:  Negative for visual disturbance.   Respiratory:  Negative for cough and shortness of breath.    Cardiovascular:  Negative for chest pain and leg swelling.   Gastrointestinal:  Positive for diarrhea. Negative for abdominal distention, abdominal pain, blood in stool, nausea and vomiting.   Genitourinary:           +anuric   Musculoskeletal:  Negative for arthralgias and back pain.   Skin:  Positive for wound.   Neurological:  Negative for headaches.     Objective:     Vital Signs (Most Recent):  Temp: 98.3 °F (36.8 °C) (10/13/23 1255)  Pulse: 84 (10/13/23 1255)  Resp: 18 (10/13/23 1255)  BP: 121/81 (10/13/23 0729)  SpO2: 100 % (10/13/23 1255) Vital Signs (24h Range):  Temp:  [97.3 °F (36.3 °C)-98.3 °F (36.8 °C)] 98.3 °F (36.8 °C)  Pulse:  [66-88] 84  Resp:  [14-18] 18  SpO2:  [98 %-100 %] 100 %  BP: (108-170)/(64-92) 121/81     Weight: 40 kg (88 lb 2.9 oz)  Body mass index is 15.14 kg/m².    Estimated Creatinine Clearance: 12.3 mL/min (A) (based on SCr of 3.1 mg/dL (H)).     Physical Exam  Vitals reviewed.   Constitutional:       General: She is not in acute distress.     Appearance: She is ill-appearing.      Comments: Frail    HENT:      Head: Normocephalic and atraumatic.   Eyes:      Conjunctiva/sclera: Conjunctivae normal.      Pupils: Pupils are equal, round, and reactive to light.   Cardiovascular:      Rate and Rhythm: Normal rate.      Heart sounds: Murmur heard.   Pulmonary:      Effort: Pulmonary effort is normal.      Breath sounds: Normal breath sounds.   Abdominal:      General: Abdomen is flat. There is no distension.      Palpations: Abdomen is  soft.      Tenderness: There is no abdominal tenderness.      Comments: Greenish- brown liquid stool on diaper/ bed   Musculoskeletal:      Cervical back: Normal range of motion.      Right lower leg: No edema.      Left lower leg: No edema.   Lymphadenopathy:      Cervical: No cervical adenopathy.   Skin:     Coloration: Skin is not jaundiced.      Comments: Scab  on rt knee  Lt chest tunneled catheter  LUE- well healing site of AVG   Neurological:      Mental Status: She is alert and oriented to person, place, and time.      Comments: lethargic          Significant Labs: Blood Culture:   Recent Labs   Lab 07/19/23  1547 07/19/23  1602 08/25/23  1908 10/10/23  1201 10/10/23  1232   LABBLOO No Growth after 4 days. No Growth after 4 days. No Growth after 4 days. No Growth to date  No Growth to date  No Growth to date  No Growth to date No Growth to date  No Growth to date  No Growth to date  No Growth to date     All pertinent labs within the past 24 hours have been reviewed.    Significant Imaging: I have reviewed all pertinent imaging results/findings within the past 24 hours.

## 2023-10-13 NOTE — PROGRESS NOTES
"[facility]  Infectious Disease  Progress Note    Patient Name: Eva Bloom  MRN: [id number]  Admission Date: 10/10/2023  Length of Stay: 3 days  Attending Physician: Leeroy Cheney MD  Primary Care Provider: Sowmya Mccain MD    Isolation Status: No active isolations  Assessment/Plan:      Other  Hypothermia  59y/o F with ESRD on HD TTS via [facility] (s/p AVG excision), HFp/rEF, PAD with toe wounds, metastatic RCC on immunotherapy since 2016 who presented for unintentional weight loss and diarrhea x 1 wk and was admitted for presumed sepsis. Has been intermittently hypothermic since admit.    Has undergone extensive septic workup that has thus far been unrevealing:    CT L foot w/o evidence of OM. Eval by podiatry and does not appear to be infected. CT chest revealed 2 new small pulm nodules and minimal area of GGO in RLL and small opacity AYE, which could be developing infection or aspiration. However sats at goal on RA and pt without respi symptoms. Tested neg for cdiff. Stool neg for crypto/ giardia. BCx 10/10 NGTD.     ID consulted for: "Presented with sepsis/hypothermia - no clear infection identified.  Are further diagnostics warranted and/or antibiotics needed?"Pt lethargic on exam and a poor historian. Only complains of fatigue and diarrhea.     Unclear if elevated Lactic acid and hypothermia are due to infectious process vs other process adrenal insuf/ hypothyroidism/ malignancy/ dehydration? Appears to be clinically improving today.     Recommendations:  --complete a 5 day course ceftriaxone / flagyl to cover for possible IA infection and pna. Last day 10/14.  --f/u stool cx and O+P      ID will sign-off for now.     Anticipated Disposition: tbd    Thank you for your consult. I will sign off. Please contact us if you have any additional questions.    Oumou Tejada MD  Infectious Disease  [facility] [place] [facility]    Subjective:     Principal Problem:Severe sepsis    HPI: Ms. Bloom is " "a 57y/o F with ESRD on HD TTS via [facility], HFp/rEF, PAD with toe wounds, metastatic RCC on immunotherapy since 2016 who presented for unintentional weight loss and was admitted for presumed sepsis.     Per chart, pt with unintentional weight loss over the last few weeks. Her appetite is poor but stable. No nausea, vomiting, dysphagia, abdominal pain noted. However, has been having diarrhea described as nonbloody liquid stools 3-4x/day over last week. Does not make urine. She is s/p recent L AVG excision, site is healing well with no drainage or erythema.  She does have L 3rd toe ulceration present. Was prescribed doxycycline after last appointment but never started it. Toe wound cx 10/2 neg.     In ED pt was hypothermic. Labs remarkable for no leukocytosis, elevated TSH (14.7), LA (5.3), BNP (>4,900).  Started on empiric vanc and zosyn. CT L foot w/o evidence of OM. Eval by podiatry and does not appear to be infected. CT chest revealed 2 new small pulm nodules and minimal area of GGO in RLL and small opacity AYE, which could be developing infection or aspiration.  Tested neg for cdiff.     ID consulted for: "Presented with sepsis/hypothermia - no clear infection identified.  Are further diagnostics warranted and/or antibiotics needed?"  Pt lethargic on exam and a poor historian. Only complains of fatigue and diarrhea.         Interval History: Normothermic this morning and more alert and interactive on exam. Notes feeling a little better. No new complaints reported.    Review of Systems   Constitutional:  Positive for fatigue and unexpected weight change. Negative for chills and fever.   HENT:  Negative for congestion, dental problem, sore throat and trouble swallowing.    Eyes:  Negative for visual disturbance.   Respiratory:  Negative for cough and shortness of breath.    Cardiovascular:  Negative for chest pain and leg swelling.   Gastrointestinal:  Positive for diarrhea. Negative for abdominal distention, abdominal " pain, blood in stool, nausea and vomiting.   Genitourinary:           +anuric   Musculoskeletal:  Negative for arthralgias and back pain.   Skin:  Positive for wound.   Neurological:  Negative for headaches.     Objective:     Vital Signs (Most Recent):  Temp: 98.3 °F (36.8 °C) (10/13/23 1255)  Pulse: 84 (10/13/23 1255)  Resp: 18 (10/13/23 1255)  BP: 121/81 (10/13/23 0729)  SpO2: 100 % (10/13/23 1255) Vital Signs (24h Range):  Temp:  [97.3 °F (36.3 °C)-98.3 °F (36.8 °C)] 98.3 °F (36.8 °C)  Pulse:  [66-88] 84  Resp:  [14-18] 18  SpO2:  [98 %-100 %] 100 %  BP: (108-170)/(64-92) 121/81     Weight: 40 kg (88 lb 2.9 oz)  Body mass index is 15.14 kg/m².    Estimated Creatinine Clearance: 12.3 mL/min (A) (based on SCr of 3.1 mg/dL (H)).     Physical Exam  Vitals reviewed.   Constitutional:       General: She is not in acute distress.     Appearance: She is ill-appearing.      Comments: Frail    HENT:      Head: Normocephalic and atraumatic.   Eyes:      Conjunctiva/sclera: Conjunctivae normal.      Pupils: Pupils are equal, round, and reactive to light.   Cardiovascular:      Rate and Rhythm: Normal rate.      Heart sounds: Murmur heard.   Pulmonary:      Effort: Pulmonary effort is normal.      Breath sounds: Normal breath sounds.   Abdominal:      General: Abdomen is flat. There is no distension.      Palpations: Abdomen is soft.      Tenderness: There is no abdominal tenderness.      Comments: Greenish- brown liquid stool on diaper/ bed   Musculoskeletal:      Cervical back: Normal range of motion.      Right lower leg: No edema.      Left lower leg: No edema.   Lymphadenopathy:      Cervical: No cervical adenopathy.   Skin:     Coloration: Skin is not jaundiced.      Comments: Scab  on rt knee  Lt chest tunneled catheter  LUE- well healing site of AVG   Neurological:      Mental Status: She is alert and oriented to person, place, and time.      Comments: lethargic          Significant Labs: Blood Culture:   Recent Labs    Lab 07/19/23  1547 07/19/23  1602 08/25/23  1908 10/10/23  1201 10/10/23  1232   LABBLOO No Growth after 4 days. No Growth after 4 days. No Growth after 4 days. No Growth to date  No Growth to date  No Growth to date  No Growth to date No Growth to date  No Growth to date  No Growth to date  No Growth to date     All pertinent labs within the past 24 hours have been reviewed.    Significant Imaging: I have reviewed all pertinent imaging results/findings within the past 24 hours.

## 2023-10-13 NOTE — ASSESSMENT & PLAN NOTE
Advance Care Planning    Patient remains full code, but is hospice appropriate.  Palliative care consulted and input appreciated.  Continue active GOC discussions.  -Remains full code

## 2023-10-13 NOTE — ASSESSMENT & PLAN NOTE
-TSH 14.4 with normal FT4  -In last 5 months TSH has ranged from 8-22 - most recently 22  -Continue levothyroxine  -Needs TSH rechecked outside of acute illness setting

## 2023-10-13 NOTE — NURSING
Received report from FELIPE Neff. Patient lying in bed resting, NAD noted. Safety Precautions maintained.

## 2023-10-14 LAB
ALBUMIN SERPL BCP-MCNC: 1.8 G/DL (ref 3.5–5.2)
ALP SERPL-CCNC: 256 U/L (ref 55–135)
ALT SERPL W/O P-5'-P-CCNC: 24 U/L (ref 10–44)
ANION GAP SERPL CALC-SCNC: 11 MMOL/L (ref 8–16)
ANISOCYTOSIS BLD QL SMEAR: SLIGHT
AST SERPL-CCNC: 49 U/L (ref 10–40)
BACTERIA BLD CULT: NORMAL
BACTERIA BLD CULT: NORMAL
BACTERIA STL CULT: NORMAL
BASOPHILS # BLD AUTO: 0.04 K/UL (ref 0–0.2)
BASOPHILS NFR BLD: 0.6 % (ref 0–1.9)
BILIRUB SERPL-MCNC: 0.5 MG/DL (ref 0.1–1)
BUN SERPL-MCNC: 22 MG/DL (ref 6–20)
CALCIUM SERPL-MCNC: 8 MG/DL (ref 8.7–10.5)
CHLORIDE SERPL-SCNC: 109 MMOL/L (ref 95–110)
CO2 SERPL-SCNC: 19 MMOL/L (ref 23–29)
CREAT SERPL-MCNC: 3.9 MG/DL (ref 0.5–1.4)
DIFFERENTIAL METHOD: ABNORMAL
EOSINOPHIL # BLD AUTO: 0.3 K/UL (ref 0–0.5)
EOSINOPHIL NFR BLD: 4.6 % (ref 0–8)
ERYTHROCYTE [DISTWIDTH] IN BLOOD BY AUTOMATED COUNT: 22.4 % (ref 11.5–14.5)
EST. GFR  (NO RACE VARIABLE): 13 ML/MIN/1.73 M^2
GIANT PLATELETS BLD QL SMEAR: PRESENT
GLUCOSE SERPL-MCNC: 94 MG/DL (ref 70–110)
HCT VFR BLD AUTO: 32.5 % (ref 37–48.5)
HGB BLD-MCNC: 10.2 G/DL (ref 12–16)
IMM GRANULOCYTES # BLD AUTO: 0.04 K/UL (ref 0–0.04)
IMM GRANULOCYTES NFR BLD AUTO: 0.6 % (ref 0–0.5)
LACTATE SERPL-SCNC: 2.3 MMOL/L (ref 0.5–2.2)
LYMPHOCYTES # BLD AUTO: 1 K/UL (ref 1–4.8)
LYMPHOCYTES NFR BLD: 15 % (ref 18–48)
MCH RBC QN AUTO: 34 PG (ref 27–31)
MCHC RBC AUTO-ENTMCNC: 31.4 G/DL (ref 32–36)
MCV RBC AUTO: 108 FL (ref 82–98)
MONOCYTES # BLD AUTO: 0.3 K/UL (ref 0.3–1)
MONOCYTES NFR BLD: 4.2 % (ref 4–15)
NEUTROPHILS # BLD AUTO: 4.9 K/UL (ref 1.8–7.7)
NEUTROPHILS NFR BLD: 75 % (ref 38–73)
NRBC BLD-RTO: 2 /100 WBC
O+P STL MICRO: NORMAL
OVALOCYTES BLD QL SMEAR: ABNORMAL
PLATELET # BLD AUTO: 136 K/UL (ref 150–450)
PMV BLD AUTO: 12.4 FL (ref 9.2–12.9)
POLYCHROMASIA BLD QL SMEAR: ABNORMAL
POTASSIUM SERPL-SCNC: 2.9 MMOL/L (ref 3.5–5.1)
PROT SERPL-MCNC: 4.5 G/DL (ref 6–8.4)
RBC # BLD AUTO: 3 M/UL (ref 4–5.4)
SODIUM SERPL-SCNC: 139 MMOL/L (ref 136–145)
WBC # BLD AUTO: 6.48 K/UL (ref 3.9–12.7)

## 2023-10-14 PROCEDURE — 83605 ASSAY OF LACTIC ACID: CPT | Performed by: HOSPITALIST

## 2023-10-14 PROCEDURE — 36415 COLL VENOUS BLD VENIPUNCTURE: CPT | Performed by: HOSPITALIST

## 2023-10-14 PROCEDURE — 85025 COMPLETE CBC W/AUTO DIFF WBC: CPT | Performed by: HOSPITALIST

## 2023-10-14 PROCEDURE — 63600175 PHARM REV CODE 636 W HCPCS: Performed by: INTERNAL MEDICINE

## 2023-10-14 PROCEDURE — 25000003 PHARM REV CODE 250: Performed by: HOSPITALIST

## 2023-10-14 PROCEDURE — A4216 STERILE WATER/SALINE, 10 ML: HCPCS | Performed by: HOSPITALIST

## 2023-10-14 PROCEDURE — 21400001 HC TELEMETRY ROOM

## 2023-10-14 PROCEDURE — 80100016 HC MAINTENANCE HEMODIALYSIS

## 2023-10-14 PROCEDURE — 80053 COMPREHEN METABOLIC PANEL: CPT | Performed by: HOSPITALIST

## 2023-10-14 PROCEDURE — 25000003 PHARM REV CODE 250: Performed by: INTERNAL MEDICINE

## 2023-10-14 RX ORDER — POTASSIUM CHLORIDE 750 MG/1
30 TABLET, EXTENDED RELEASE ORAL ONCE
Status: COMPLETED | OUTPATIENT
Start: 2023-10-14 | End: 2023-10-14

## 2023-10-14 RX ORDER — MIDODRINE HYDROCHLORIDE 5 MG/1
5 TABLET ORAL 3 TIMES DAILY
Status: DISCONTINUED | OUTPATIENT
Start: 2023-10-14 | End: 2023-10-16

## 2023-10-14 RX ADMIN — METRONIDAZOLE 500 MG: 250 TABLET ORAL at 08:10

## 2023-10-14 RX ADMIN — APIXABAN 2.5 MG: 2.5 TABLET, FILM COATED ORAL at 08:10

## 2023-10-14 RX ADMIN — DRONABINOL 2.5 MG: 2.5 CAPSULE ORAL at 08:10

## 2023-10-14 RX ADMIN — DRONABINOL 2.5 MG: 2.5 CAPSULE ORAL at 09:10

## 2023-10-14 RX ADMIN — MIDODRINE HYDROCHLORIDE 5 MG: 5 TABLET ORAL at 08:10

## 2023-10-14 RX ADMIN — LACTOBACILLUS ACIDOPHILUS / LACTOBACILLUS BULGARICUS 1 EACH: 100 MILLION CFU STRENGTH GRANULES at 08:10

## 2023-10-14 RX ADMIN — MUPIROCIN: 20 OINTMENT TOPICAL at 08:10

## 2023-10-14 RX ADMIN — CEFTRIAXONE 2 G: 2 INJECTION, POWDER, FOR SOLUTION INTRAMUSCULAR; INTRAVENOUS at 08:10

## 2023-10-14 RX ADMIN — LEVOTHYROXINE SODIUM 75 MCG: 75 TABLET ORAL at 06:10

## 2023-10-14 RX ADMIN — MIDODRINE HYDROCHLORIDE 5 MG: 5 TABLET ORAL at 03:10

## 2023-10-14 RX ADMIN — POTASSIUM CHLORIDE 30 MEQ: 750 TABLET, EXTENDED RELEASE ORAL at 03:10

## 2023-10-14 RX ADMIN — ERYTHROPOIETIN 4000 UNITS: 4000 INJECTION, SOLUTION INTRAVENOUS; SUBCUTANEOUS at 06:10

## 2023-10-14 RX ADMIN — SODIUM CHLORIDE 250 ML: 9 INJECTION, SOLUTION INTRAVENOUS at 09:10

## 2023-10-14 RX ADMIN — Medication 10 ML: at 06:10

## 2023-10-14 RX ADMIN — Medication 10 ML: at 03:10

## 2023-10-14 RX ADMIN — NEPHROCAP 1 CAPSULE: 1 CAP ORAL at 08:10

## 2023-10-14 RX ADMIN — Medication 10 ML: at 09:10

## 2023-10-14 NOTE — PROGRESS NOTES
[facility] Medicine  Progress Note    Patient Name: Eva Bloom  MRN: [id number]  Patient Class: IP- Inpatient   Admission Date: 10/10/2023  Length of Stay: 4 days  Attending Physician: Leeroy Cheney MD  Primary Care Provider: Sowmya Mccain MD        Subjective:     Principal Problem:Severe sepsis        HPI:  Ms Eva Bloom is a 58 y.o. woman with ESRD on HD TTS via THDC, systolic/diastolic CHF, PAD with toe wounds who presents for weight loss.  reports weight loss over last few weeks. Her appetite is poor but stable. No nausea, vomiting, dysphagia, abdominal pain. Reports diarrhea liquid stools 3-4x/day over last week. Does not make urine. She is s/p recent L AVG excision, site is healing well with no drainage or erythema. S/p L THDC placement, also working well with no issues. She does have L 3rd toe ulceration present, following with Podiatry. Was prescribed doxycycline after last appointment but never started it. Does have RCC following with Oncology on immunotherapy.     In ED, noted hypothermic and drowsy. Lactic elevated. Started warming blanket and broad antibiotics. Admitted to ICU.       Overview/Hospital Course:  Ms Eva Bloom was admitted to ICU with hypothermia, diarrhea, and lactic acidosis concerning for infection. Blood cultures NGTD. She does have L 3rd toe wound but does not appear infected. Does not make urine. L AVG excision site no infection. Cdiff negative, other stool studies are pending. Started broad antibiotics while awaiting further culture data. Nephrology consulted to continue dialysis.       Interval History: No acute events overnight.  This morning noted SBP in mid 70s and patient deterioration alert issued.  HD held off for now.  Seen quickly and she is mentating well and has no complaints.  She is very eager to go home and asking why we are not releasing her.  Explained risk of her low blood pressure.  Ate a bit more for breakfast  today.  All questions answered and patient had no further complaints.    Objective:     Vital Signs (Most Recent):  Temp: 97.4 °F (36.3 °C) (10/14/23 0823)  Pulse: 72 (10/14/23 0823)  Resp: 16 (10/14/23 0823)  BP: (!) 76/42 (midodrine, Bolus ordered.) (10/14/23 0835)  SpO2: 100 % (10/14/23 0823) Vital Signs (24h Range):  Temp:  [97.3 °F (36.3 °C)-98.3 °F (36.8 °C)] 97.4 °F (36.3 °C)  Pulse:  [72-84] 72  Resp:  [16-18] 16  SpO2:  [99 %-100 %] 100 %  BP: ()/(42-90) 76/42     Weight: 40 kg (88 lb 2.9 oz)  Body mass index is 15.14 kg/m².    Intake/Output Summary (Last 24 hours) at 10/14/2023 0914  Last data filed at 10/13/2023 1822  Gross per 24 hour   Intake 170 ml   Output --   Net 170 ml           Physical Exam  Vitals and nursing note reviewed.   Constitutional:       General: She is not in acute distress.     Appearance: She is ill-appearing. She is not toxic-appearing or diaphoretic.      Comments: cachexia   HENT:      Head: Normocephalic and atraumatic.      Nose: Nose normal.      Mouth/Throat:      Mouth: Mucous membranes are moist.   Eyes:      Extraocular Movements: Extraocular movements intact.   Cardiovascular:      Rate and Rhythm: Normal rate and regular rhythm.   Pulmonary:      Effort: Pulmonary effort is normal.      Breath sounds: Normal breath sounds.      Comments: Room air  Abdominal:      General: Bowel sounds are normal.      Palpations: Abdomen is soft.      Tenderness: There is no abdominal tenderness.   Musculoskeletal:      Cervical back: Normal range of motion.      Right lower leg: No edema.      Left lower leg: No edema.   Skin:     General: Skin is warm and dry.      Comments: L AVG site bandaged. L 3rd toe bandaged. L THDC in place   Neurological:      Mental Status: She is alert and oriented to person, place, and time.             Significant Labs: All pertinent labs within the past 24 hours have been reviewed.    Significant Imaging: I have reviewed all pertinent imaging  results/findings within the past 24 hours.      Assessment/Plan:      * Severe sepsis  -Admitted to inpatient status  -On admit hypothermic without tachycardia, leukocytosis but with lactic acidosis and mildly elevated procalcitonin  -CT chest/abd/pelvis- no new source of infection noted  -CT L foot shows no evidence for osteomyelitis  -Organ dysfunction indicated by Encephalopathy  -Blood cultures negative  -C.diff negative.  No fecal WBC. Giardia and crypto Ag negative.  Stool culture unremarkable.  -Note she is s/p AVG excision 9/28/23 for infection.   -Source of sepsis is unclear - has diarrhea and noted skin/soft tissue wound on foot  -Podiatry consulted to evaluate foot wounds- appears stable, no signs of cellulitis - continue wound care  -Unclear if this is actual sepsis or SIRS due to non-infectious etiology  -Consulted ID and input appreciated  -Noted AM cortisol is borderline - did cosyntropin stim test 10/13 with peak response 17.2.  For her diminished body habitus this is a satisfactory response and I doubt adrenal insufficiency.  -Now euthermic with normal mental status by hypotensive  -Bolus 250cc ns and start midodrine  -If needed can resume warming blanket for hypothermia   -Agree with ID recs to complete a 5 day course of ceftriaxone and flagy to cover for possible IA infection.  Continue antibiotics for now.    Hypothermia  -This is persistent despite broad spectrum antibiotics  -Unclear if due to sepsis or other cause  -Noted elevated TSH, but it is improved and she is on synthroid  -Doubt adrenal insufficiency  -Now euthermic  -Treatment as above.    Metastatic renal cell carcinoma to lung, unspecified laterality  -History noted  -Follows with Oncology, last seen 9/2023  -On cabozantinib at home   -Repeat CT to look for any progression in setting of worsening weight loss - showed Two new small pulmonary nodules, minimal area of ground-glass opacity in the right lower lobe with a small parenchymal  opacity in the left upper lobe and possible small hypodensity in the pancreas  -Hold home Rx for now   -Consulted palliative care and input appreciated.  Discussed with Palliative care team today.    Severe protein-calorie malnutrition  -Body mass index is 15.14 kg/m².  -Noted unintentional weight loss  -Nutrition consulted  -Agree with trial of dronabinol - eating a bit more today    Weight loss  -Treatment as above    Body mass index (BMI) less than 19  -Treatment as above    PAF (paroxysmal atrial fibrillation)  -Patient has Paroxysmal (<7 days) atrial fibrillation which is controlled.   -Patient is currently in sinus rhythm.  -Continue toprol and eliquis    Chronic combined systolic and diastolic heart failure  -Echo 5/8/23 showed EF 23%, grade II diastolic dysfunction, mild AV stenosis and pulmonary hypertension with PASP 47mmHg  -AICD in place  -Strict ins/outs and daily weights  -Appears euvolemic to hypovolemic despite BNP > 4000 on admit  -Stop entresto today due to low bp  -Continue toprol but place hold parameters to avoid hypotension  -HD for fluid removal    ICD (implantable cardioverter-defibrillator) in place - SubQ  -Treatment as above.    Essential hypertension  -BP low today but asymptomatic  -Home med list includes toprol, entresto and clonidine patch  -Holding clonidine for now as it can cause drowsiness  -Entresto and toprol as above for chf.    Hypothyroidism  -TSH 14.4 with normal FT4  -In last 5 months TSH has ranged from 8-22 - most recently 22  -Continue levothyroxine  -Needs TSH rechecked outside of acute illness setting     ESRD (end stage renal disease) on dialysis  -Does HD  TTS via THDC  -Nephrology consulted and input appreciated  -Wound care consult for LUE site    Anemia in ESRD (end-stage renal disease)  -On admit Hb 10.7 with MCV > 100  -Hb now 9.3 - await AM labs for today  -No evidence of bleeding  -B12 and folate normal when last checked. TSAT appropriate  -EPO with HD  -Repeat  cbc in AM    Hyperparathyroidism, secondary renal  Lab Results   Component Value Date    PTH >2,500.0 (H) 02/17/2023    CALCIUM 8.9 10/13/2023    PHOS 3.5 10/13/2023   -Continues on home lanthanum     ACP (advance care planning)  Advance Care Planning   Patient remains full code, but is hospice appropriate.  Palliative care consulted and input appreciated.  Continue active GOC discussions.  -Remains full code      VTE Risk Mitigation (From admission, onward)         Ordered     apixaban tablet 2.5 mg  2 times daily         10/10/23 1646     Place PING hose  Until discontinued         10/10/23 1422     Place sequential compression device  Until discontinued         10/10/23 1422     Reason for No Pharmacological VTE Prophylaxis  Once        Question:  Reasons:  Answer:  Already adequately anticoagulated on oral Anticoagulants    10/10/23 1422     IP VTE HIGH RISK PATIENT  Once         10/10/23 1422                Discharge Planning   MAVIS:      Code Status: Full Code   Is the patient medically ready for discharge?:     Reason for patient still in hospital (select all that apply): Treatment  Discharge Plan A: Home Health                  Leeroy Cheney MD  Department of Hospital Medicine   [facility]

## 2023-10-14 NOTE — AI DETERIORATION ALERT
Artificial Intelligence Notification  [facility]  [place] [id number]  Phone: [phone]    This documentation was triggered by an Artificial Intelligence Notification:    Admit Date: 10/10/2023   LOS: 4  Code Status: Full Code  : 1965  Age: 58 y.o.  Weight:   Wt Readings from Last 1 Encounters:   10/13/23 40 kg (88 lb 2.9 oz)        Sex: female  [facility]  MRN: [id number]  Attending Physician: Leeroy Cheney MD     Date of Alert: 10/14/2023  Time AI Alert Received: 8?35            Vitals:    10/14/23 0835   BP: (!) 76/42   Pulse:    Resp:    Temp:      SpO2: 100 %    Patient seen and examined.  Low BP noted, but no tachycardia or diminished LOC.  Clinically looks improved from prior days.  See progress note for plan of care.

## 2023-10-14 NOTE — SUBJECTIVE & OBJECTIVE
Interval History: No acute events overnight.  This morning noted SBP in mid 70s and patient deterioration alert issued.  HD held off for now.  Seen quickly and she is mentating well and has no complaints.  She is very eager to go home and asking why we are not releasing her.  Explained risk of her low blood pressure.  Ate a bit more for breakfast today.  All questions answered and patient had no further complaints.    Objective:     Vital Signs (Most Recent):  Temp: 97.4 °F (36.3 °C) (10/14/23 0823)  Pulse: 72 (10/14/23 0823)  Resp: 16 (10/14/23 0823)  BP: (!) 76/42 (midodrine, Bolus ordered.) (10/14/23 0835)  SpO2: 100 % (10/14/23 0823) Vital Signs (24h Range):  Temp:  [97.3 °F (36.3 °C)-98.3 °F (36.8 °C)] 97.4 °F (36.3 °C)  Pulse:  [72-84] 72  Resp:  [16-18] 16  SpO2:  [99 %-100 %] 100 %  BP: ()/(42-90) 76/42     Weight: 40 kg (88 lb 2.9 oz)  Body mass index is 15.14 kg/m².    Intake/Output Summary (Last 24 hours) at 10/14/2023 0914  Last data filed at 10/13/2023 1822  Gross per 24 hour   Intake 170 ml   Output --   Net 170 ml           Physical Exam  Vitals and nursing note reviewed.   Constitutional:       General: She is not in acute distress.     Appearance: She is ill-appearing. She is not toxic-appearing or diaphoretic.      Comments: cachexia   HENT:      Head: Normocephalic and atraumatic.      Nose: Nose normal.      Mouth/Throat:      Mouth: Mucous membranes are moist.   Eyes:      Extraocular Movements: Extraocular movements intact.   Cardiovascular:      Rate and Rhythm: Normal rate and regular rhythm.   Pulmonary:      Effort: Pulmonary effort is normal.      Breath sounds: Normal breath sounds.      Comments: Room air  Abdominal:      General: Bowel sounds are normal.      Palpations: Abdomen is soft.      Tenderness: There is no abdominal tenderness.   Musculoskeletal:      Cervical back: Normal range of motion.      Right lower leg: No edema.      Left lower leg: No edema.   Skin:      General: Skin is warm and dry.      Comments: L AVG site bandaged. L 3rd toe bandaged. L THDC in place   Neurological:      Mental Status: She is alert and oriented to person, place, and time.             Significant Labs: All pertinent labs within the past 24 hours have been reviewed.    Significant Imaging: I have reviewed all pertinent imaging results/findings within the past 24 hours.

## 2023-10-14 NOTE — ASSESSMENT & PLAN NOTE
-Echo 5/8/23 showed EF 23%, grade II diastolic dysfunction, mild AV stenosis and pulmonary hypertension with PASP 47mmHg  -AICD in place  -Strict ins/outs and daily weights  -Appears euvolemic to hypovolemic despite BNP > 4000 on admit  -Stop entresto today due to low bp  -Continue toprol but place hold parameters to avoid hypotension  -HD for fluid removal

## 2023-10-14 NOTE — NURSING
[facility]  Nurses Note -- 4 Eyes          Skin assessed on: Q Shift      [] No Pressure Injuries Present    []Prevention Measures Documented    [x] Yes LDA  for Pressure Injury Previously documented     [] Yes New Pressure Injury Discovered   [] LDA for New Pressure Injury Added      Attending RN:  Tawanda Fink RN     Second RN:  Valerie CHEN RN

## 2023-10-14 NOTE — PROGRESS NOTES
Eva Bloom is a 58 y.o. female patient.    Follow for ESRD, dialysis    Patient seen while on dialsyis  Comfortable    Scheduled Meds:   sodium chloride 0.9%   Intravenous Once    apixaban  2.5 mg Oral BID    cefTRIAXone (ROCEPHIN) IVPB  2 g Intravenous Q24H    droNABinol  2.5 mg Oral BID    epoetin david (PROCRIT) injection  4,000 Units Intravenous Every Tues, Thurs, Sat    lactobacillus acidophilus & bulgar  1 packet Oral BID    lanthanum  1,000 mg Oral TID WM    levothyroxine  75 mcg Oral Before breakfast    metoprolol succinate  50 mg Oral Daily    metroNIDAZOLE  500 mg Oral Q12H    midodrine  5 mg Oral TID    mupirocin   Nasal BID    sodium chloride 0.9%  10 mL Intravenous Q8H    vitamin renal formula (B-complex-vitamin c-folic acid)  1 capsule Oral Daily       Review of patient's allergies indicates:   Allergen Reactions    Coreg [carvedilol] Other (See Comments)     Nausea/vomiting    Allopurinol      Other reaction(s): abnormal transaminases         Vital Signs Range (Last 24H):  Temp:  [97.3 °F (36.3 °C)-98.3 °F (36.8 °C)]   Pulse:  [72-84]   Resp:  [16-18]   BP: ()/(42-90)   SpO2:  [99 %-100 %]     I & O (Last 24H):  Intake/Output Summary (Last 24 hours) at 10/14/2023 1051  Last data filed at 10/13/2023 1822  Gross per 24 hour   Intake 120 ml   Output --   Net 120 ml           Physical Exam:  General appearance: well developed, cachectic, no distress  Lungs:  clear to auscultation bilaterally and normal respiratory effort  Heart: regular rate and rhythm  Abdomen:  soft, normal bowel sounds  Extremities: no cyanosis or edema, or clubbing    Laboratory:  I have reviewed all pertinent lab results within the past 24 hours.  CBC:   Recent Labs   Lab 10/13/23  0403   WBC 6.79   RBC 2.78*   HGB 9.3*   HCT 30.4*   *   *   MCH 33.5*   MCHC 30.6*     CMP:   Recent Labs   Lab 10/13/23  0403   GLU 80   CALCIUM 8.9   ALBUMIN 2.2*   PROT 5.5*      K 3.6   CO2 16*      BUN 16    CREATININE 3.1*   ALKPHOS 328*   ALT 36   AST 71*   BILITOT 0.7       Assessment & Plan    ESRD - on dialysis, stable, tolerated well  Metastatic RCCA  Cachexia  Anemia of CKD    Continue present RX  HD q TTS  We'll follow for dialysis        Maren Puentes  10/14/2023

## 2023-10-14 NOTE — ASSESSMENT & PLAN NOTE
-Body mass index is 15.14 kg/m².  -Noted unintentional weight loss  -Nutrition consulted  -Agree with trial of dronabinol - eating a bit more today   head injury

## 2023-10-14 NOTE — ASSESSMENT & PLAN NOTE
-BP low today but asymptomatic  -Home med list includes toprol, entresto and clonidine patch  -Holding clonidine for now as it can cause drowsiness  -Entresto and toprol as above for chf.

## 2023-10-14 NOTE — ASSESSMENT & PLAN NOTE
-Admitted to inpatient status  -On admit hypothermic without tachycardia, leukocytosis but with lactic acidosis and mildly elevated procalcitonin  -CT chest/abd/pelvis- no new source of infection noted  -CT L foot shows no evidence for osteomyelitis  -Organ dysfunction indicated by Encephalopathy  -Blood cultures negative  -C.diff negative.  No fecal WBC. Giardia and crypto Ag negative.  Stool culture unremarkable.  -Note she is s/p AVG excision 9/28/23 for infection.   -Source of sepsis is unclear - has diarrhea and noted skin/soft tissue wound on foot  -Podiatry consulted to evaluate foot wounds- appears stable, no signs of cellulitis - continue wound care  -Unclear if this is actual sepsis or SIRS due to non-infectious etiology  -Consulted ID and input appreciated  -Noted AM cortisol is borderline - did cosyntropin stim test 10/13 with peak response 17.2.  For her diminished body habitus this is a satisfactory response and I doubt adrenal insufficiency.  -Now euthermic with normal mental status by hypotensive  -Bolus 250cc ns and start midodrine  -If needed can resume warming blanket for hypothermia   -Agree with ID recs to complete a 5 day course of ceftriaxone and flagy to cover for possible IA infection.  Continue antibiotics for now.

## 2023-10-14 NOTE — NURSING
[facility]  Rapid Response Nurse Intervention/ Task    Date of Visit: 10/14/2023  Time of Visit: 2315       INTERVENTIONS/ TASK Completed:     20g PIV placed to  R UA using US guidance. Pt tolerated well. LDA placed in chart and primary RN Tawanda notified.

## 2023-10-14 NOTE — NURSING
[facility]  Nurses Note -- 4 Eyes      10/13/2023       Skin assessed on: Q Shift      [] No Pressure Injuries Present    []Prevention Measures Documented    [x] Yes LDA  for Pressure Injury Previously documented     [] Yes New Pressure Injury Discovered   [] LDA for New Pressure Injury Added      Attending RN:  Tawanda Fink RN     Second RN:  Valerie CHEN RN

## 2023-10-14 NOTE — NURSING
[facility]  Nurses Note -- 4 Eyes      10/14/2023       Skin assessed on: Q Shift      [] No Pressure Injuries Present    []Prevention Measures Documented  Bilat foams to hips for prevention    [x] Yes LDA  for Pressure Injury Previously documented   Sacrum Redness, Raw, Bleeding, Foam  Right knee scab x2 Foam  Left Toe Skin tear, open to air,  L upper arm open wound where AV graft is placed. Gauze in place.    [] Yes New Pressure Injury Discovered   [] LDA for New Pressure Injury Added      Attending RN:  Shauna Galvan LPN     Second RN:  Nikos WANG

## 2023-10-14 NOTE — ASSESSMENT & PLAN NOTE
-On admit Hb 10.7 with MCV > 100  -Hb now 9.3 - await AM labs for today  -No evidence of bleeding  -B12 and folate normal when last checked. TSAT appropriate  -EPO with HD  -Repeat cbc in AM

## 2023-10-15 LAB
ALBUMIN SERPL BCP-MCNC: 2.3 G/DL (ref 3.5–5.2)
ALP SERPL-CCNC: 325 U/L (ref 55–135)
ALT SERPL W/O P-5'-P-CCNC: 31 U/L (ref 10–44)
ANION GAP SERPL CALC-SCNC: 14 MMOL/L (ref 8–16)
AST SERPL-CCNC: 55 U/L (ref 10–40)
BASOPHILS # BLD AUTO: 0.05 K/UL (ref 0–0.2)
BASOPHILS NFR BLD: 1 % (ref 0–1.9)
BILIRUB SERPL-MCNC: 0.6 MG/DL (ref 0.1–1)
BUN SERPL-MCNC: 12 MG/DL (ref 6–20)
CALCIUM SERPL-MCNC: 9.6 MG/DL (ref 8.7–10.5)
CHLORIDE SERPL-SCNC: 112 MMOL/L (ref 95–110)
CO2 SERPL-SCNC: 14 MMOL/L (ref 23–29)
CREAT SERPL-MCNC: 3.2 MG/DL (ref 0.5–1.4)
DIFFERENTIAL METHOD: ABNORMAL
EOSINOPHIL # BLD AUTO: 0.2 K/UL (ref 0–0.5)
EOSINOPHIL NFR BLD: 4.3 % (ref 0–8)
ERYTHROCYTE [DISTWIDTH] IN BLOOD BY AUTOMATED COUNT: 23.7 % (ref 11.5–14.5)
EST. GFR  (NO RACE VARIABLE): 16 ML/MIN/1.73 M^2
GLUCOSE SERPL-MCNC: 74 MG/DL (ref 70–110)
HCT VFR BLD AUTO: 38.3 % (ref 37–48.5)
HGB BLD-MCNC: 11.6 G/DL (ref 12–16)
IMM GRANULOCYTES # BLD AUTO: 0.03 K/UL (ref 0–0.04)
IMM GRANULOCYTES NFR BLD AUTO: 0.6 % (ref 0–0.5)
LACTATE SERPL-SCNC: 2.1 MMOL/L (ref 0.5–2.2)
LYMPHOCYTES # BLD AUTO: 1.2 K/UL (ref 1–4.8)
LYMPHOCYTES NFR BLD: 22.9 % (ref 18–48)
MCH RBC QN AUTO: 33.3 PG (ref 27–31)
MCHC RBC AUTO-ENTMCNC: 30.3 G/DL (ref 32–36)
MCV RBC AUTO: 110 FL (ref 82–98)
MONOCYTES # BLD AUTO: 0.3 K/UL (ref 0.3–1)
MONOCYTES NFR BLD: 5.7 % (ref 4–15)
NEUTROPHILS # BLD AUTO: 3.3 K/UL (ref 1.8–7.7)
NEUTROPHILS NFR BLD: 65.5 % (ref 38–73)
NRBC BLD-RTO: 3 /100 WBC
PLATELET # BLD AUTO: 167 K/UL (ref 150–450)
PMV BLD AUTO: 12.4 FL (ref 9.2–12.9)
POTASSIUM SERPL-SCNC: 3.9 MMOL/L (ref 3.5–5.1)
PROT SERPL-MCNC: 6 G/DL (ref 6–8.4)
RBC # BLD AUTO: 3.48 M/UL (ref 4–5.4)
SODIUM SERPL-SCNC: 140 MMOL/L (ref 136–145)
WBC # BLD AUTO: 5.06 K/UL (ref 3.9–12.7)

## 2023-10-15 PROCEDURE — 25000003 PHARM REV CODE 250: Performed by: INTERNAL MEDICINE

## 2023-10-15 PROCEDURE — 80053 COMPREHEN METABOLIC PANEL: CPT | Performed by: HOSPITALIST

## 2023-10-15 PROCEDURE — 63600175 PHARM REV CODE 636 W HCPCS: Performed by: INTERNAL MEDICINE

## 2023-10-15 PROCEDURE — 25000003 PHARM REV CODE 250: Performed by: HOSPITALIST

## 2023-10-15 PROCEDURE — 83605 ASSAY OF LACTIC ACID: CPT | Performed by: HOSPITALIST

## 2023-10-15 PROCEDURE — A4216 STERILE WATER/SALINE, 10 ML: HCPCS | Performed by: HOSPITALIST

## 2023-10-15 PROCEDURE — 21400001 HC TELEMETRY ROOM

## 2023-10-15 PROCEDURE — 85025 COMPLETE CBC W/AUTO DIFF WBC: CPT | Performed by: HOSPITALIST

## 2023-10-15 PROCEDURE — 36415 COLL VENOUS BLD VENIPUNCTURE: CPT | Performed by: HOSPITALIST

## 2023-10-15 RX ADMIN — MUPIROCIN: 20 OINTMENT TOPICAL at 09:10

## 2023-10-15 RX ADMIN — MIDODRINE HYDROCHLORIDE 5 MG: 5 TABLET ORAL at 03:10

## 2023-10-15 RX ADMIN — APIXABAN 2.5 MG: 2.5 TABLET, FILM COATED ORAL at 09:10

## 2023-10-15 RX ADMIN — METOPROLOL SUCCINATE 50 MG: 50 TABLET, EXTENDED RELEASE ORAL at 09:10

## 2023-10-15 RX ADMIN — MIDODRINE HYDROCHLORIDE 5 MG: 5 TABLET ORAL at 09:10

## 2023-10-15 RX ADMIN — Medication 10 ML: at 02:10

## 2023-10-15 RX ADMIN — Medication 10 ML: at 05:10

## 2023-10-15 RX ADMIN — Medication 10 ML: at 09:10

## 2023-10-15 RX ADMIN — DRONABINOL 2.5 MG: 2.5 CAPSULE ORAL at 10:10

## 2023-10-15 RX ADMIN — LEVOTHYROXINE SODIUM 75 MCG: 75 TABLET ORAL at 05:10

## 2023-10-15 RX ADMIN — NEPHROCAP 1 CAPSULE: 1 CAP ORAL at 09:10

## 2023-10-15 RX ADMIN — METRONIDAZOLE 500 MG: 250 TABLET ORAL at 09:10

## 2023-10-15 RX ADMIN — LACTOBACILLUS ACIDOPHILUS / LACTOBACILLUS BULGARICUS 1 EACH: 100 MILLION CFU STRENGTH GRANULES at 09:10

## 2023-10-15 RX ADMIN — ACETAMINOPHEN 650 MG: 325 TABLET ORAL at 06:10

## 2023-10-15 NOTE — ASSESSMENT & PLAN NOTE
-This is persistent despite broad spectrum antibiotics  -Unclear if due to sepsis or other cause  -Noted elevated TSH, but it is improved and she is on synthroid  -Doubt adrenal insufficiency and sepsis.  -Treatment as above.

## 2023-10-15 NOTE — ASSESSMENT & PLAN NOTE
-BP low 10/14 requiring entresto be stopped and midodrine initiated  -BP today improved  -Home med list includes toprol, entresto and clonidine patch  -Continue to hold clonidine for now as it can cause drowsiness and entresto  -Continues on midodrine and toprol with hold parameters

## 2023-10-15 NOTE — ASSESSMENT & PLAN NOTE
-Admitted to inpatient status  -On admit hypothermic without tachycardia, leukocytosis but with lactic acidosis and mildly elevated procalcitonin  -CT chest/abd/pelvis- no new source of infection noted  -CT L foot shows no evidence for osteomyelitis  -Organ dysfunction indicated by Encephalopathy  -Blood cultures negative  -C.diff negative.  No fecal WBC. Giardia and crypto Ag negative.  Stool culture unremarkable.  -Note she is s/p AVG excision 9/28/23 for infection.   -Source of sepsis is unclear - has diarrhea and noted skin/soft tissue wound on foot  -Podiatry consulted to evaluate foot wounds- appears stable, no signs of cellulitis - continue wound care  -Unclear if this is actual sepsis or SIRS due to non-infectious etiology  -Consulted ID and input appreciated  -Noted AM cortisol is borderline - did cosyntropin stim test 10/13 with peak response 17.2.  For her diminished body habitus this is a satisfactory response and I doubt adrenal insufficiency.  -No convincing evidence of active infection after extensive workup.  Completed 5 day course of of IV antibiotics per ID recs.  -Now again hypothermic and lethargic requiring warming blanket  -Suspect this is SIRS due to advancing cancer and malnutrition  -Discussed goals of care with  and sister-in-law at bedside and recommended DNR status and palliative or full comfort based care.  They are discussing today.

## 2023-10-15 NOTE — PT/OT/SLP PROGRESS
Physical Therapy      Patient Name:  Eva Bloom   MRN:  [id number]    Patient not seen today secondary to Nurse/ MARTA hold. Patient somnelent and has a temp of 94 per nurse Shauna.Will follow-up when appropriate.  Sarah Pichardo PTA.

## 2023-10-15 NOTE — PROGRESS NOTES
[facility] Medicine  Progress Note    Patient Name: Eva Bloom  MRN: [id number]  Patient Class: IP- Inpatient   Admission Date: 10/10/2023  Length of Stay: 5 days  Attending Physician: Leeroy Cheney MD  Primary Care Provider: Sowmya Mccain MD        Subjective:     Principal Problem:Severe sepsis        HPI:  Ms Eva Bloom is a 58 y.o. woman with ESRD on HD TTS via THDC, systolic/diastolic CHF, PAD with toe wounds who presents for weight loss.  reports weight loss over last few weeks. Her appetite is poor but stable. No nausea, vomiting, dysphagia, abdominal pain. Reports diarrhea liquid stools 3-4x/day over last week. Does not make urine. She is s/p recent L AVG excision, site is healing well with no drainage or erythema. S/p L THDC placement, also working well with no issues. She does have L 3rd toe ulceration present, following with Podiatry. Was prescribed doxycycline after last appointment but never started it. Does have RCC following with Oncology on immunotherapy.     In ED, noted hypothermic and drowsy. Lactic elevated. Started warming blanket and broad antibiotics. Admitted to ICU.       Overview/Hospital Course:  Ms Eva Bloom was admitted to ICU with hypothermia, diarrhea, and lactic acidosis concerning for infection. Blood cultures NGTD. She does have L 3rd toe wound but does not appear infected. Does not make urine. L AVG excision site no infection. Cdiff negative, other stool studies are pending. Started broad antibiotics while awaiting further culture data. Nephrology consulted to continue dialysis.       Interval History: No acute events overnight, but this morning again hypothermic requiring warming blanket.  She is much more lethargic today but has received no sedatives.   at bedside during initial visit.  Sister-in-law at bedside at afternoon visit.  All questions answered and patient had no further complaints.    Objective:     Vital Signs  (Most Recent):  Temp: 98.5 °F (36.9 °C) (Warming Coolidge removed) (10/15/23 1424)  Pulse: 81 (10/15/23 1127)  Resp: 17 (10/15/23 1127)  BP: 124/77 (10/15/23 1127)  SpO2: 96 % (10/15/23 1127) Vital Signs (24h Range):  Temp:  [94.5 °F (34.7 °C)-98.6 °F (37 °C)] 98.5 °F (36.9 °C)  Pulse:  [74-81] 81  Resp:  [16-20] 17  SpO2:  [95 %-100 %] 96 %  BP: (115-141)/(74-89) 124/77     Weight: 40 kg (88 lb 2.9 oz)  Body mass index is 15.14 kg/m².    Intake/Output Summary (Last 24 hours) at 10/15/2023 1504  Last data filed at 10/15/2023 1136  Gross per 24 hour   Intake 150 ml   Output --   Net 150 ml           Physical Exam  Vitals and nursing note reviewed.   Constitutional:       General: She is not in acute distress.     Appearance: She is ill-appearing. She is not toxic-appearing or diaphoretic.      Comments: cachexia   HENT:      Head: Normocephalic and atraumatic.      Nose: Nose normal.      Mouth/Throat:      Mouth: Mucous membranes are moist.   Eyes:      Extraocular Movements: Extraocular movements intact.   Cardiovascular:      Rate and Rhythm: Normal rate and regular rhythm.   Pulmonary:      Effort: Pulmonary effort is normal.      Breath sounds: Normal breath sounds.      Comments: Room air  Abdominal:      General: Bowel sounds are normal.      Palpations: Abdomen is soft.      Tenderness: There is no abdominal tenderness.   Musculoskeletal:      Cervical back: Normal range of motion.      Right lower leg: No edema.      Left lower leg: No edema.   Skin:     General: Skin is warm and dry.      Comments: L AVG site bandaged. L 3rd toe bandaged. L THDC in place   Neurological:      Mental Status: She is alert.      Comments: Very lethargic - opens eyes to voice and attempts to speak but I cannot understand.               Significant Labs: All pertinent labs within the past 24 hours have been reviewed.    Significant Imaging: I have reviewed all pertinent imaging results/findings within the past 24  hours.      Assessment/Plan:      * Severe sepsis  -Admitted to inpatient status  -On admit hypothermic without tachycardia, leukocytosis but with lactic acidosis and mildly elevated procalcitonin  -CT chest/abd/pelvis- no new source of infection noted  -CT L foot shows no evidence for osteomyelitis  -Organ dysfunction indicated by Encephalopathy  -Blood cultures negative  -C.diff negative.  No fecal WBC. Giardia and crypto Ag negative.  Stool culture unremarkable.  -Note she is s/p AVG excision 9/28/23 for infection.   -Source of sepsis is unclear - has diarrhea and noted skin/soft tissue wound on foot  -Podiatry consulted to evaluate foot wounds- appears stable, no signs of cellulitis - continue wound care  -Unclear if this is actual sepsis or SIRS due to non-infectious etiology  -Consulted ID and input appreciated  -Noted AM cortisol is borderline - did cosyntropin stim test 10/13 with peak response 17.2.  For her diminished body habitus this is a satisfactory response and I doubt adrenal insufficiency.  -No convincing evidence of active infection after extensive workup.  Completed 5 day course of of IV antibiotics per ID recs.  -Now again hypothermic and lethargic requiring warming blanket  -Suspect this is SIRS due to advancing cancer and malnutrition  -Discussed goals of care with  and sister-in-law at bedside and recommended DNR status and palliative or full comfort based care.  They are discussing today.    Hypothermia  -This is persistent despite broad spectrum antibiotics  -Unclear if due to sepsis or other cause  -Noted elevated TSH, but it is improved and she is on synthroid  -Doubt adrenal insufficiency and sepsis.  -Treatment as above.    Metastatic renal cell carcinoma to lung, unspecified laterality  -History noted  -Follows with Oncology, last seen 9/2023  -On cabozantinib at home   -Repeat CT to look for any progression in setting of worsening weight loss - showed Two new small pulmonary  nodules, minimal area of ground-glass opacity in the right lower lobe with a small parenchymal opacity in the left upper lobe and possible small hypodensity in the pancreas  -Hold home Rx for now   -Consulted palliative care and input appreciated.      Severe protein-calorie malnutrition  -Body mass index is 15.14 kg/m².  -Noted unintentional weight loss  -Nutrition consulted  -Agree with trial of dronabinol - not eating at all today    Weight loss  -Treatment as above    Body mass index (BMI) less than 19  -Treatment as above    PAF (paroxysmal atrial fibrillation)  -Patient has Paroxysmal (<7 days) atrial fibrillation which is controlled.   -Patient is currently in sinus rhythm.  -Continue toprol and eliquis    Chronic combined systolic and diastolic heart failure  -Echo 5/8/23 showed EF 23%, grade II diastolic dysfunction, mild AV stenosis and pulmonary hypertension with PASP 47mmHg  -AICD in place  -Strict ins/outs and daily weights  -Appears euvolemic to hypovolemic despite BNP > 4000 on admit  -Stopped entresto 10/14 due to low bp  -Continue toprol but place hold parameters to avoid hypotension  -HD for fluid removal    ICD (implantable cardioverter-defibrillator) in place - SubQ  -Treatment as above.    Essential hypertension  -BP low 10/14 requiring entresto be stopped and midodrine initiated  -BP today improved  -Home med list includes toprol, entresto and clonidine patch  -Continue to hold clonidine for now as it can cause drowsiness and entresto  -Continues on midodrine and toprol with hold parameters    Hypothyroidism  -TSH 14.4 with normal FT4  -In last 5 months TSH has ranged from 8-22 - most recently 22  -Continue levothyroxine  -Needs TSH rechecked outside of acute illness setting     ESRD (end stage renal disease) on dialysis  -Does HD  TTS via THDC  -Nephrology consulted and input appreciated  -Wound care consult for LUE site    Anemia in ESRD (end-stage renal disease)  -On admit Hb 10.7 with MCV >  100  -Hb now 11.6  -No evidence of bleeding  -B12 and folate normal when last checked. TSAT appropriate  -EPO with HD  -Repeat cbc in AM    Hyperparathyroidism, secondary renal  Lab Results   Component Value Date    PTH >2,500.0 (H) 02/17/2023    CALCIUM 9.6 10/15/2023    PHOS 3.5 10/13/2023   Home lanthanum ordered but has not been taking    ACP (advance care planning)  Advance Care Planning   Patient remains full code, but is hospice appropriate.  Palliative care consulted and input appreciated.  Continue active GOC discussions.  -Remains full code  -10/15/23 spoke with  at bedside regarding my fear she may be actively dying.  Discussed pros and cons of ACLS treatment and in her case the risk:benefit ratio favors DNR status.  Further, I recommended a transition to focus on comfort care with home based palliative or hospice care.  Her  asked that I speak with his sister, Jose De Jesus, who I met with in the afternoon.  I discussed our fears of her advancing cancer, severe malnutrition, inability to maintain body temperature, severe debility, severe chf, skin breakdown and pre-existing ESRD requiring dialysis.  I shared the same recommendations with her.  She is in favor of DNR status and will discuss with her brother and nephews.  I gave them my cellphone number and asked them to let me know if they have questions or make a decision to change code status or pivot to comfort based care.  I spent 45 minutes ACP 10/15/23.      VTE Risk Mitigation (From admission, onward)         Ordered     apixaban tablet 2.5 mg  2 times daily         10/10/23 1646     Place PING hose  Until discontinued         10/10/23 1422     Place sequential compression device  Until discontinued         10/10/23 1422     Reason for No Pharmacological VTE Prophylaxis  Once        Question:  Reasons:  Answer:  Already adequately anticoagulated on oral Anticoagulants    10/10/23 1422     IP VTE HIGH RISK PATIENT  Once         10/10/23  1422                Discharge Planning   MAVIS:      Code Status: Full Code   Is the patient medically ready for discharge?:     Reason for patient still in hospital (select all that apply): Treatment  Discharge Plan A: Home Health                  Leeroy Cheney MD  Department of [facility] Medicine   [facility]

## 2023-10-15 NOTE — ASSESSMENT & PLAN NOTE
Advance Care Planning    Patient remains full code, but is hospice appropriate.  Palliative care consulted and input appreciated.  Continue active GOC discussions.  -Remains full code  -10/15/23 spoke with  at bedside regarding my fear she may be actively dying.  Discussed pros and cons of ACLS treatment and in her case the risk:benefit ratio favors DNR status.  Further, I recommended a transition to focus on comfort care with home based palliative or hospice care.  Her  asked that I speak with his sister, Jose De Jesus, who I met with in the afternoon.  I discussed our fears of her advancing cancer, severe malnutrition, inability to maintain body temperature, severe debility, severe chf, skin breakdown and pre-existing ESRD requiring dialysis.  I shared the same recommendations with her.  She is in favor of DNR status and will discuss with her brother and nephews.  I gave them my cellphone number and asked them to let me know if they have questions or make a decision to change code status or pivot to comfort based care.  I spent 45 minutes ACP 10/15/23.

## 2023-10-15 NOTE — ASSESSMENT & PLAN NOTE
-On admit Hb 10.7 with MCV > 100  -Hb now 11.6  -No evidence of bleeding  -B12 and folate normal when last checked. TSAT appropriate  -EPO with HD  -Repeat cbc in AM

## 2023-10-15 NOTE — ASSESSMENT & PLAN NOTE
-History noted  -Follows with Oncology, last seen 9/2023  -On cabozantinib at home   -Repeat CT to look for any progression in setting of worsening weight loss - showed Two new small pulmonary nodules, minimal area of ground-glass opacity in the right lower lobe with a small parenchymal opacity in the left upper lobe and possible small hypodensity in the pancreas  -Hold home Rx for now   -Consulted palliative care and input appreciated.

## 2023-10-15 NOTE — CARE UPDATE
Discussed with her  at 6PM.  He has decided that DNR status is most consistent with the family's goals for Ms. Bloom at this time.  He is leaning towards a home based palliative care and likely trying to continue with dialysis.  I will place DNR order and consult case management to arrange for a meeting with a home based palliative care company such as [facility].

## 2023-10-15 NOTE — ASSESSMENT & PLAN NOTE
Lab Results   Component Value Date    PTH >2,500.0 (H) 02/17/2023    CALCIUM 9.6 10/15/2023    PHOS 3.5 10/13/2023   Home lanthanum ordered but has not been taking

## 2023-10-15 NOTE — ASSESSMENT & PLAN NOTE
-Body mass index is 15.14 kg/m².  -Noted unintentional weight loss  -Nutrition consulted  -Agree with trial of dronabinol - not eating at all today

## 2023-10-15 NOTE — ASSESSMENT & PLAN NOTE
-Echo 5/8/23 showed EF 23%, grade II diastolic dysfunction, mild AV stenosis and pulmonary hypertension with PASP 47mmHg  -AICD in place  -Strict ins/outs and daily weights  -Appears euvolemic to hypovolemic despite BNP > 4000 on admit  -Stopped entresto 10/14 due to low bp  -Continue toprol but place hold parameters to avoid hypotension  -HD for fluid removal

## 2023-10-16 LAB
ALBUMIN SERPL BCP-MCNC: 2.2 G/DL (ref 3.5–5.2)
ALP SERPL-CCNC: 277 U/L (ref 55–135)
ALT SERPL W/O P-5'-P-CCNC: 27 U/L (ref 10–44)
ANION GAP SERPL CALC-SCNC: 12 MMOL/L (ref 8–16)
AST SERPL-CCNC: 74 U/L (ref 10–40)
BASOPHILS # BLD AUTO: 0.09 K/UL (ref 0–0.2)
BASOPHILS NFR BLD: 1.6 % (ref 0–1.9)
BILIRUB SERPL-MCNC: 0.5 MG/DL (ref 0.1–1)
BUN SERPL-MCNC: 16 MG/DL (ref 6–20)
CALCIUM SERPL-MCNC: 9.4 MG/DL (ref 8.7–10.5)
CHLORIDE SERPL-SCNC: 112 MMOL/L (ref 95–110)
CO2 SERPL-SCNC: 16 MMOL/L (ref 23–29)
CREAT SERPL-MCNC: 4.3 MG/DL (ref 0.5–1.4)
DIFFERENTIAL METHOD: ABNORMAL
EOSINOPHIL # BLD AUTO: 0.3 K/UL (ref 0–0.5)
EOSINOPHIL NFR BLD: 5.8 % (ref 0–8)
ERYTHROCYTE [DISTWIDTH] IN BLOOD BY AUTOMATED COUNT: 23 % (ref 11.5–14.5)
EST. GFR  (NO RACE VARIABLE): 11 ML/MIN/1.73 M^2
GLUCOSE SERPL-MCNC: 70 MG/DL (ref 70–110)
HCT VFR BLD AUTO: 35.9 % (ref 37–48.5)
HGB BLD-MCNC: 10.8 G/DL (ref 12–16)
IMM GRANULOCYTES # BLD AUTO: 0.02 K/UL (ref 0–0.04)
IMM GRANULOCYTES NFR BLD AUTO: 0.4 % (ref 0–0.5)
LACTATE SERPL-SCNC: 1.6 MMOL/L (ref 0.5–2.2)
LYMPHOCYTES # BLD AUTO: 1.3 K/UL (ref 1–4.8)
LYMPHOCYTES NFR BLD: 23.2 % (ref 18–48)
MCH RBC QN AUTO: 34.2 PG (ref 27–31)
MCHC RBC AUTO-ENTMCNC: 30.1 G/DL (ref 32–36)
MCV RBC AUTO: 114 FL (ref 82–98)
MONOCYTES # BLD AUTO: 0.5 K/UL (ref 0.3–1)
MONOCYTES NFR BLD: 8.2 % (ref 4–15)
NEUTROPHILS # BLD AUTO: 3.4 K/UL (ref 1.8–7.7)
NEUTROPHILS NFR BLD: 60.8 % (ref 38–73)
NRBC BLD-RTO: 3 /100 WBC
PLATELET # BLD AUTO: 193 K/UL (ref 150–450)
PMV BLD AUTO: 12.4 FL (ref 9.2–12.9)
POTASSIUM SERPL-SCNC: 4.6 MMOL/L (ref 3.5–5.1)
PROT SERPL-MCNC: 5.8 G/DL (ref 6–8.4)
RBC # BLD AUTO: 3.16 M/UL (ref 4–5.4)
SODIUM SERPL-SCNC: 140 MMOL/L (ref 136–145)
WBC # BLD AUTO: 5.52 K/UL (ref 3.9–12.7)

## 2023-10-16 PROCEDURE — 97110 THERAPEUTIC EXERCISES: CPT

## 2023-10-16 PROCEDURE — 99498 ADVNCD CARE PLAN ADDL 30 MIN: CPT | Mod: GW,HPC,, | Performed by: REGISTERED NURSE

## 2023-10-16 PROCEDURE — 97110 THERAPEUTIC EXERCISES: CPT | Mod: CQ

## 2023-10-16 PROCEDURE — 83605 ASSAY OF LACTIC ACID: CPT | Performed by: HOSPITALIST

## 2023-10-16 PROCEDURE — 25000003 PHARM REV CODE 250: Performed by: HOSPITALIST

## 2023-10-16 PROCEDURE — 99497 PR ADVNCD CARE PLAN 30 MIN: ICD-10-PCS | Mod: GW,HPC,, | Performed by: REGISTERED NURSE

## 2023-10-16 PROCEDURE — 25000003 PHARM REV CODE 250: Performed by: INTERNAL MEDICINE

## 2023-10-16 PROCEDURE — 36415 COLL VENOUS BLD VENIPUNCTURE: CPT | Performed by: HOSPITALIST

## 2023-10-16 PROCEDURE — 63600175 PHARM REV CODE 636 W HCPCS: Performed by: INTERNAL MEDICINE

## 2023-10-16 PROCEDURE — 99232 PR SUBSEQUENT HOSPITAL CARE,LEVL II: ICD-10-PCS | Mod: GW,HPC,, | Performed by: REGISTERED NURSE

## 2023-10-16 PROCEDURE — 99232 SBSQ HOSP IP/OBS MODERATE 35: CPT | Mod: GW,HPC,, | Performed by: REGISTERED NURSE

## 2023-10-16 PROCEDURE — 97530 THERAPEUTIC ACTIVITIES: CPT

## 2023-10-16 PROCEDURE — A4216 STERILE WATER/SALINE, 10 ML: HCPCS | Performed by: HOSPITALIST

## 2023-10-16 PROCEDURE — 99497 ADVNCD CARE PLAN 30 MIN: CPT | Mod: GW,HPC,, | Performed by: REGISTERED NURSE

## 2023-10-16 PROCEDURE — 85025 COMPLETE CBC W/AUTO DIFF WBC: CPT | Performed by: HOSPITALIST

## 2023-10-16 PROCEDURE — 21400001 HC TELEMETRY ROOM

## 2023-10-16 PROCEDURE — 99498 PR ADVNCD CARE PLAN ADDL 30 MIN: ICD-10-PCS | Mod: GW,HPC,, | Performed by: REGISTERED NURSE

## 2023-10-16 PROCEDURE — 80053 COMPREHEN METABOLIC PANEL: CPT | Performed by: HOSPITALIST

## 2023-10-16 RX ORDER — MIDODRINE HYDROCHLORIDE 5 MG/1
5 TABLET ORAL 2 TIMES DAILY WITH MEALS
Status: DISCONTINUED | OUTPATIENT
Start: 2023-10-16 | End: 2023-10-18 | Stop reason: HOSPADM

## 2023-10-16 RX ADMIN — MUPIROCIN: 20 OINTMENT TOPICAL at 09:10

## 2023-10-16 RX ADMIN — APIXABAN 2.5 MG: 2.5 TABLET, FILM COATED ORAL at 09:10

## 2023-10-16 RX ADMIN — APIXABAN 2.5 MG: 2.5 TABLET, FILM COATED ORAL at 10:10

## 2023-10-16 RX ADMIN — NEPHROCAP 1 CAPSULE: 1 CAP ORAL at 09:10

## 2023-10-16 RX ADMIN — LACTOBACILLUS ACIDOPHILUS / LACTOBACILLUS BULGARICUS 1 EACH: 100 MILLION CFU STRENGTH GRANULES at 09:10

## 2023-10-16 RX ADMIN — DRONABINOL 2.5 MG: 2.5 CAPSULE ORAL at 10:10

## 2023-10-16 RX ADMIN — ACETAMINOPHEN 650 MG: 325 TABLET ORAL at 09:10

## 2023-10-16 RX ADMIN — MIDODRINE HYDROCHLORIDE 5 MG: 5 TABLET ORAL at 05:10

## 2023-10-16 RX ADMIN — LEVOTHYROXINE SODIUM 75 MCG: 75 TABLET ORAL at 06:10

## 2023-10-16 RX ADMIN — MIDODRINE HYDROCHLORIDE 5 MG: 5 TABLET ORAL at 09:10

## 2023-10-16 RX ADMIN — Medication 10 ML: at 02:10

## 2023-10-16 RX ADMIN — LACTOBACILLUS ACIDOPHILUS / LACTOBACILLUS BULGARICUS 1 EACH: 100 MILLION CFU STRENGTH GRANULES at 10:10

## 2023-10-16 RX ADMIN — Medication 10 ML: at 06:10

## 2023-10-16 RX ADMIN — METOPROLOL SUCCINATE 50 MG: 50 TABLET, EXTENDED RELEASE ORAL at 09:10

## 2023-10-16 RX ADMIN — LANTHANUM CARBONATE 1000 MG: 500 TABLET, CHEWABLE ORAL at 05:10

## 2023-10-16 RX ADMIN — Medication 10 ML: at 10:10

## 2023-10-16 RX ADMIN — LANTHANUM CARBONATE 1000 MG: 500 TABLET, CHEWABLE ORAL at 12:10

## 2023-10-16 RX ADMIN — LANTHANUM CARBONATE 1000 MG: 500 TABLET, CHEWABLE ORAL at 08:10

## 2023-10-16 RX ADMIN — DRONABINOL 2.5 MG: 2.5 CAPSULE ORAL at 09:10

## 2023-10-16 NOTE — ASSESSMENT & PLAN NOTE
- noted   - now that GOC have shifted to DNR; de-activation of defibrillator should be discussed prior to discharge home, recommend magnet available until discharge; discussed with Dr. Cheney and CEZAR/Dandre LEWIS

## 2023-10-16 NOTE — NURSING
[facility] [place]  Nurses Note -- 4 Eyes      10/16/2023       Skin assessed on: Q Shift      [] No Pressure Injuries Present    []Prevention Measures Documented    [x] Yes LDA  for Pressure Injury Previously documented     [] Yes New Pressure Injury Discovered   [] LDA for New Pressure Injury Added      Attending RN:  Elen Cardenas RN     Second RN:  Shauna KEN LPN

## 2023-10-16 NOTE — PROGRESS NOTES
[facility] Medicine  Progress Note    Patient Name: Eva Bloom  MRN: [id number]  Patient Class: IP- Inpatient   Admission Date: 10/10/2023  Length of Stay: 6 days  Attending Physician: Leeroy Cheney MD  Primary Care Provider: Sowmya Mccain MD        Subjective:     Principal Problem:Severe sepsis        HPI:  Ms Eva Bloom is a 58 y.o. woman with ESRD on HD TTS via THDC, systolic/diastolic CHF, PAD with toe wounds who presents for weight loss.  reports weight loss over last few weeks. Her appetite is poor but stable. No nausea, vomiting, dysphagia, abdominal pain. Reports diarrhea liquid stools 3-4x/day over last week. Does not make urine. She is s/p recent L AVG excision, site is healing well with no drainage or erythema. S/p L THDC placement, also working well with no issues. She does have L 3rd toe ulceration present, following with Podiatry. Was prescribed doxycycline after last appointment but never started it. Does have RCC following with Oncology on immunotherapy.     In ED, noted hypothermic and drowsy. Lactic elevated. Started warming blanket and broad antibiotics. Admitted to ICU.       Overview/Hospital Course:  Ms Eva Bloom was admitted to ICU with hypothermia, diarrhea, and lactic acidosis concerning for infection. Blood cultures NGTD. She does have L 3rd toe wound but does not appear infected. Does not make urine. L AVG excision site no infection. Cdiff negative, other stool studies are pending. Started broad antibiotics while awaiting further culture data. Nephrology consulted to continue dialysis.       Interval History: No acute events overnight.  Now euthermic and more alert/conversant.  Denies pain and sob.  Ate about 25% of a breakfast sandwhich this morning.   and sister-in-law at bedside during visit.  All questions answered and patient had no further complaints.    Objective:     Vital Signs (Most Recent):  Temp: 97.6 °F (36.4 °C)  (10/16/23 1058)  Pulse: 75 (10/16/23 1058)  Resp: 18 (10/16/23 1058)  BP: 125/68 (10/16/23 1058)  SpO2: 99 % (10/16/23 1058) Vital Signs (24h Range):  Temp:  [96.3 °F (35.7 °C)-98.5 °F (36.9 °C)] 97.6 °F (36.4 °C)  Pulse:  [75-90] 75  Resp:  [16-18] 18  SpO2:  [95 %-99 %] 99 %  BP: (125-139)/(68-92) 125/68     Weight: 40 kg (88 lb 2.9 oz)  Body mass index is 15.14 kg/m².    Intake/Output Summary (Last 24 hours) at 10/16/2023 1419  Last data filed at 10/16/2023 0900  Gross per 24 hour   Intake 160 ml   Output --   Net 160 ml           Physical Exam  Vitals and nursing note reviewed.   Constitutional:       General: She is not in acute distress.     Appearance: She is ill-appearing. She is not toxic-appearing or diaphoretic.      Comments: cachexia   HENT:      Head: Normocephalic and atraumatic.      Nose: Nose normal.      Mouth/Throat:      Mouth: Mucous membranes are moist.   Eyes:      Extraocular Movements: Extraocular movements intact.   Cardiovascular:      Rate and Rhythm: Normal rate and regular rhythm.   Pulmonary:      Effort: Pulmonary effort is normal.      Breath sounds: Normal breath sounds.      Comments: Room air  Abdominal:      General: Bowel sounds are normal.      Palpations: Abdomen is soft.      Tenderness: There is no abdominal tenderness.   Musculoskeletal:      Cervical back: Normal range of motion.      Right lower leg: No edema.      Left lower leg: No edema.   Skin:     General: Skin is warm and dry.      Comments: L AVG site bandaged. L 3rd toe bandaged. L THDC in place   Neurological:      Mental Status: She is alert.      Comments: Alert and attentive.  Answers a few questions - yes/no.  Diffusely weak             Significant Labs: All pertinent labs within the past 24 hours have been reviewed.    Significant Imaging: I have reviewed all pertinent imaging results/findings within the past 24 hours.        Assessment/Plan:      * Severe sepsis  -Admitted to inpatient status  -On admit  hypothermic without tachycardia, leukocytosis but with lactic acidosis and mildly elevated procalcitonin  -CT chest/abd/pelvis- no new source of infection noted  -CT L foot shows no evidence for osteomyelitis  -Organ dysfunction indicated by Encephalopathy  -Blood cultures negative  -C.diff negative.  No fecal WBC. Giardia and crypto Ag negative.  Stool culture unremarkable.  -Note she is s/p AVG excision 9/28/23 for infection.   -Source of sepsis is unclear - had diarrhea and noted skin/soft tissue wound on foot  -Podiatry consulted to evaluate foot wounds- appears stable, no signs of cellulitis - continue wound care  -Unclear if this is actual sepsis or SIRS due to non-infectious etiology  -Consulted ID and input appreciated  -Noted AM cortisol was borderline - did cosyntropin stim test 10/13 with peak response 17.2.  For her diminished body habitus this is a satisfactory response and I doubt adrenal insufficiency.  -No convincing evidence of active infection after extensive workup.  Completed 5 day course of of IV antibiotics per ID recs.  -On 10/15 was again hypothermic and lethargic requiring warming blanket.  Today euthermic and alert but still VERY weak.  -Suspect this is SIRS due to advancing cancer and severe malnutrition  -Patient now DNR and we are pursuing home hospice vs palliative care with ongoing dialysis.  CM consulted to arrange family meeting with 2 different agencies.  If hospice is chosen, will need to deactivate AICD (not pacer) prior to discharge.    Hypothermia  -This is persistent despite broad spectrum antibiotics  -Unclear if due to sepsis or other cause  -Noted elevated TSH, but it is improved and she is on synthroid  -Doubt adrenal insufficiency and sepsis.  -Treatment as above.    Metastatic renal cell carcinoma to lung, unspecified laterality  -History noted  -Follows with Oncology, last seen 9/2023  -On cabozantinib at home   -Repeat CT to look for any progression in setting of  worsening weight loss - showed Two new small pulmonary nodules, minimal area of ground-glass opacity in the right lower lobe with a small parenchymal opacity in the left upper lobe and possible small hypodensity in the pancreas  -Hold home Rx for now   -Consulted palliative care and input appreciated.      Severe protein-calorie malnutrition  -Body mass index is 15.14 kg/m².  -Noted unintentional weight loss  -Nutrition consulted  -Did not tolerate higher dose of dronabinol - was too lethargic.    -Agree with cutting back dose.  Ate 25% of breakfast sandwich today    Weight loss  -Treatment as above    Body mass index (BMI) less than 19  -Treatment as above    PAF (paroxysmal atrial fibrillation)  -Patient has Paroxysmal (<7 days) atrial fibrillation which is controlled.   -Patient is currently in sinus rhythm.  -Continue toprol and eliquis    Chronic combined systolic and diastolic heart failure  -Echo 5/8/23 showed EF 23%, grade II diastolic dysfunction, mild AV stenosis and pulmonary hypertension with PASP 47mmHg  -AICD in place  -Strict ins/outs and daily weights  -Appeared euvolemic to hypovolemic despite BNP > 4000 on admit  -Stopped entresto 10/14 due to low bp  -Continue toprol but with hold parameters to avoid hypotension  -Continue midodrine  -HD for fluid removal as able    ICD (implantable cardioverter-defibrillator) in place - SubQ  -If hospice chosen at discharge - will need to deactivate defibrillator prior to discharge  -Treatment as above.    Essential hypertension  -BP low 10/14 requiring fluid bolus, entresto be stopped and midodrine initiated  -BP solidly normal today.  -Home med list includes toprol, entresto and clonidine patch  -Continue to hold entresto and clonidine for now as it can cause drowsiness   -Continues on midodrine and toprol with hold parameters    Hypothyroidism  -TSH 14.4 with normal FT4  -In last 5 months TSH has ranged from 8-22 - most recently 22  -Continue  levothyroxine  -Needs TSH rechecked outside of acute illness setting     ESRD (end stage renal disease) on dialysis  -Does HD  TTS via THDC  -Nephrology consulted and input appreciated  -Wound care consult for LUE site    Anemia in ESRD (end-stage renal disease)  -On admit Hb 10.7 with MCV > 100  -Hb now 10.8  -No evidence of bleeding  -B12 and folate normal when last checked. TSAT appropriate  -EPO with HD    Hyperparathyroidism, secondary renal  Lab Results   Component Value Date    PTH >2,500.0 (H) 02/17/2023    CALCIUM 9.4 10/16/2023    PHOS 3.5 10/13/2023   Home lanthanum ordered but has not been taking    ACP (advance care planning)  Advance Care Planning   Patient remains full code, but is hospice appropriate.  Palliative care consulted and input appreciated.  Continue active GOC discussions.  -Remains full code  -10/15/23 spoke with  at bedside regarding my fear she may be actively dying.  Discussed pros and cons of ACLS treatment and in her case the risk:benefit ratio favors DNR status.  Further, I recommended a transition to focus on comfort care with home based palliative or hospice care.  Her  asked that I speak with his sister, Jose De Jesus, who I met with in the afternoon.  I discussed our fears of her advancing cancer, severe malnutrition, inability to maintain body temperature, severe debility, severe chf, skin breakdown and pre-existing ESRD requiring dialysis.  I shared the same recommendations with her.  She is in favor of DNR status and will discuss with her brother and nephews.  I gave them my cellphone number and asked them to let me know if they have questions or make a decision to change code status or pivot to comfort based care.  I spent 45 minutes ACP 10/15/23.  -CM consulted 10/16 to arrange for family meeting with 2 different agencies.  Unclear if they will choose palliative or hospice care at discharge.  Appreciate assistance of palliative care team.      VTE Risk Mitigation  (From admission, onward)         Ordered     apixaban tablet 2.5 mg  2 times daily         10/10/23 1646     Place PING hose  Until discontinued         10/10/23 1422     Place sequential compression device  Until discontinued         10/10/23 1422     Reason for No Pharmacological VTE Prophylaxis  Once        Question:  Reasons:  Answer:  Already adequately anticoagulated on oral Anticoagulants    10/10/23 1422     IP VTE HIGH RISK PATIENT  Once         10/10/23 1422                Discharge Planning   MAVIS:      Code Status: DNR   Is the patient medically ready for discharge?:     Reason for patient still in hospital (select all that apply): Treatment and Pending disposition  Discharge Plan A: Home with family, Home Health   Discharge Delays: None known at this time              Leeroy Cheney MD  Department of Hospital Medicine   [facility]

## 2023-10-16 NOTE — ASSESSMENT & PLAN NOTE
-BP low 10/14 requiring fluid bolus, entresto be stopped and midodrine initiated  -BP solidly normal today.  -Home med list includes toprol, entresto and clonidine patch  -Continue to hold entresto and clonidine for now as it can cause drowsiness   -Continues on midodrine and toprol with hold parameters

## 2023-10-16 NOTE — ASSESSMENT & PLAN NOTE
-If hospice chosen at discharge - will need to deactivate defibrillator prior to discharge  -Treatment as above.

## 2023-10-16 NOTE — PROGRESS NOTES
Date of Admission:10/10/2023    SUBJECTIVE:sister in law notes more drowsy, concern of marinol    Current Facility-Administered Medications   Medication    0.9%  NaCl infusion    0.9%  NaCl infusion    acetaminophen tablet 650 mg    apixaban tablet 2.5 mg    dextrose 10% bolus 125 mL 125 mL    dextrose 10% bolus 250 mL 250 mL    droNABinol capsule 2.5 mg    epoetin david injection 4,000 Units    glucagon (human recombinant) injection 1 mg    glucose chewable tablet 16 g    glucose chewable tablet 24 g    HYDROcodone-acetaminophen 5-325 mg per tablet 1 tablet    lactobacillus acidophilus & bulgar 100 million cell packet 1 each    lanthanum chewable tablet 1,000 mg    levothyroxine tablet 75 mcg    loperamide capsule 2 mg    melatonin tablet 6 mg    metoprolol succinate (TOPROL-XL) 24 hr tablet 50 mg    midodrine tablet 5 mg    mupirocin 2 % ointment    naloxone 0.4 mg/mL injection 0.02 mg    ondansetron injection 4 mg    prochlorperazine injection Soln 5 mg    sodium chloride 0.9% bolus 250 mL 250 mL    sodium chloride 0.9% flush 10 mL    vitamin renal formula (B-complex-vitamin c-folic acid) 1 mg per capsule 1 capsule     Facility-Administered Medications Ordered in Other Encounters   Medication    0.9%  NaCl infusion       Wt Readings from Last 3 Encounters:   10/13/23 40 kg (88 lb 2.9 oz)   10/02/23 40.8 kg (90 lb)   09/27/23 40.8 kg (90 lb)     Temp Readings from Last 3 Encounters:   10/16/23 98.4 °F (36.9 °C)   09/29/23 97.3 °F (36.3 °C) (Oral)   09/22/23 97.8 °F (36.6 °C) (Oral)     BP Readings from Last 3 Encounters:   10/16/23 139/84   09/29/23 108/70   09/22/23 120/83     Pulse Readings from Last 3 Encounters:   10/16/23 75   09/29/23 73   09/22/23 68       Intake/Output Summary (Last 24 hours) at 10/16/2023 0949  Last data filed at 10/15/2023 2155  Gross per 24 hour   Intake 110 ml   Output --   Net 110 ml         PE:  GEN:wd female in nad  HEENT:ncat,eyes closed, mmm  CVS:s1s2  regular  PULM:ctab  ABD:bs,soft  EXT:no leg edema  NEURO:somnolent  pc    Recent Labs   Lab 10/16/23  0529   GLU 70      K 4.6   *   CO2 16*   BUN 16   CREATININE 4.3*   CALCIUM 9.4         Lab Results   Component Value Date    PTH >2,500.0 (H) 02/17/2023    CALCIUM 9.4 10/16/2023    PHOS 3.5 10/13/2023       Recent Labs   Lab 10/16/23  0529   WBC 5.52   RBC 3.16*   HGB 10.8*   HCT 35.9*      *   MCH 34.2*   MCHC 30.1*             A/P:  1.esrd. hd in am. Cont tx tts. [facility] WB.  2.anemia 2nd to esrd. No prbc indicated. Epo cont.  3.2nd hyperpara. Phos at goal.  4.lactic acidosis.resolved.  5.cachexia. try marinol. Wt up. Will back of frequency with sleeping.  6.rcc. pt is declining. Recommend   7.htn. sbp ok.  8.metabolic acidosis. Adjust hd bath.

## 2023-10-16 NOTE — PLAN OF CARE
Plan of care is ongoing.    Problem: Adult Inpatient Plan of Care  Goal: Plan of Care Review  Outcome: Ongoing, Progressing  Goal: Patient-Specific Goal (Individualized)  Outcome: Ongoing, Progressing  Goal: Absence of Hospital-Acquired Illness or Injury  Outcome: Ongoing, Progressing  Goal: Optimal Comfort and Wellbeing  Outcome: Ongoing, Progressing  Goal: Readiness for Transition of Care  Outcome: Ongoing, Progressing     Problem: Coping Ineffective  Goal: Effective Coping  Outcome: Ongoing, Progressing     Problem: Infection  Goal: Absence of Infection Signs and Symptoms  Outcome: Ongoing, Progressing     Problem: Adjustment to Illness (Sepsis/Septic Shock)  Goal: Optimal Coping  Outcome: Ongoing, Progressing     Problem: Bleeding (Sepsis/Septic Shock)  Goal: Absence of Bleeding  Outcome: Ongoing, Progressing     Problem: Glycemic Control Impaired (Sepsis/Septic Shock)  Goal: Blood Glucose Level Within Desired Range  Outcome: Ongoing, Progressing     Problem: Infection Progression (Sepsis/Septic Shock)  Goal: Absence of Infection Signs and Symptoms  Outcome: Ongoing, Progressing     Problem: Nutrition Impaired (Sepsis/Septic Shock)  Goal: Optimal Nutrition Intake  Outcome: Ongoing, Progressing     Problem: Impaired Wound Healing  Goal: Optimal Wound Healing  Outcome: Ongoing, Progressing     Problem: Skin Injury Risk Increased  Goal: Skin Health and Integrity  Outcome: Ongoing, Progressing     Problem: Device-Related Complication Risk (Hemodialysis)  Goal: Safe, Effective Therapy Delivery  Outcome: Ongoing, Progressing     Problem: Hemodynamic Instability (Hemodialysis)  Goal: Effective Tissue Perfusion  Outcome: Ongoing, Progressing     Problem: Infection (Hemodialysis)  Goal: Absence of Infection Signs and Symptoms  Outcome: Ongoing, Progressing     Problem: Fall Injury Risk  Goal: Absence of Fall and Fall-Related Injury  Outcome: Ongoing, Progressing

## 2023-10-16 NOTE — ASSESSMENT & PLAN NOTE
-Echo 5/8/23 showed EF 23%, grade II diastolic dysfunction, mild AV stenosis and pulmonary hypertension with PASP 47mmHg  -AICD in place  -Strict ins/outs and daily weights  -Appeared euvolemic to hypovolemic despite BNP > 4000 on admit  -Stopped entresto 10/14 due to low bp  -Continue toprol but with hold parameters to avoid hypotension  -Continue midodrine  -HD for fluid removal as able

## 2023-10-16 NOTE — ASSESSMENT & PLAN NOTE
Lab Results   Component Value Date    PTH >2,500.0 (H) 02/17/2023    CALCIUM 9.4 10/16/2023    PHOS 3.5 10/13/2023   Home lanthanum ordered but has not been taking

## 2023-10-16 NOTE — PT/OT/SLP PROGRESS
Physical Therapy Treatment    Patient Name:  Eva Bloom   MRN:  [id number]    Recommendations:     Discharge Recommendations:  Low Level Intensity  Discharge Equipment Recommendations: wheelchair, bedside commode, bath bench  Barriers to discharge: None    Assessment:     Eva Bloom is a 58 y.o. female admitted with a medical diagnosis of Severe sepsis.  She presents with the following impairments/functional limitations: weakness, impaired endurance, impaired self care skills, impaired functional mobility, gait instability, impaired balance, decreased upper extremity function, decreased lower extremity function, pain, abnormal tone, decreased ROM, impaired skin.    Pt in bed with family at bedside upon arrival. Pt stated she wants to sit EOB for therapy.    Rehab Prognosis: Fair; patient would benefit from acute skilled PT services to address these deficits and reach maximum level of function.    Recent Surgery: * No surgery found *      Plan:     During this hospitalization, patient to be seen 3 x/week to address the identified rehab impairments via gait training, therapeutic activities, therapeutic exercises, wheelchair management/training and progress toward the following goals:    Plan of Care Expires:  10/27/23    Subjective     Chief Complaint: Very tired  Patient/Family Comments/goals: Pt agreed to sit EOB today  Pain/Comfort:  Pain Rating 1: 0/10      Objective:     Communicated with Zamzam prior to session.  Patient found HOB elevated with peripheral IV, telemetry upon PT entry to room.     General Precautions: Standard, fall (ESRD on HD TTS)  Orthopedic Precautions: N/A  Braces: N/A  Respiratory Status: Room air     Functional Mobility:  Bed Mobility:     Rolling Left: minimum assistance  Scooting: minimum assistance to scoot anteriorly towards EOB, required vc for foot placement on floor   Supine to Sit: minimum assistance, required LE guidance  Transfers: x3 trials from EOB using RW and Min A  x1 person. Pt denied further session 2* fatigue.    Sit to Stand: minimum assistance with rolling walker. Pt required v/t cueing to stand fully erect, RW management, and hand placement to push from bed rather than RW.  Balance: fair static/dynamic, Fair Standing      AM-PAC 6 CLICK MOBILITY  Turning over in bed (including adjusting bedclothes, sheets and blankets)?: 3  Sitting down on and standing up from a chair with arms (e.g., wheelchair, bedside commode, etc.): 3  Moving from lying on back to sitting on the side of the bed?: 3  Moving to and from a bed to a chair (including a wheelchair)?: 2  Need to walk in hospital room?: 2  Climbing 3-5 steps with a railing?: 1  Basic Mobility Total Score: 14       Treatment & Education:  Pt instructed to perform seated LAQ, AP, hip flex 1x15 BLE. Pt required tactile cues to maintain posture and to ensure proper technique of exercises.    Pt performed 3 STS from EOB using RW and required cueing for postural control and RW management.    Patient left sitting edge of bed with all lines intact, call button in reach, and Nurse Zamzam notified.    GOALS:   Multidisciplinary Problems       Physical Therapy Goals          Problem: Physical Therapy    Goal Priority Disciplines Outcome Goal Variances Interventions   Physical Therapy Goal     PT, PT/OT Ongoing, Progressing     Description: Goals to be met by: 10/27/23     Patient will increase functional independence with mobility by performin. Supine to sit with Stand-by Assistance  2. Rolling to Left and Right with Stand-by Assistance  3. Sit to stand transfer with Stand-by Assistance using RW  4. Bed to chair transfer with Stand-by Assistance using Rolling Walker  5. Gait x50 feet with Stand-by Assistance using Rolling Walker   6. Wheelchair propulsion x50 feet with Stand-by Assistance using bilateral upper/lower extremities  7. Lower extremity exercise program x10 reps per handout, with SBA                         Time  Tracking:     PT Received On: 10/16/23  PT Start Time: 1438     PT Stop Time: 1451  PT Total Time (min): 13 min     Billable Minutes: Therapeutic Exercise 13    Treatment Type: Treatment  PT/PTA: PTA     Number of PTA visits since last PT visit: 1     10/16/2023

## 2023-10-16 NOTE — SUBJECTIVE & OBJECTIVE
Interval History: No acute events overnight.  Now euthermic and more alert/conversant.  Denies pain and sob.  Ate about 25% of a breakfast sandwhich this morning.   and sister-in-law at bedside during visit.  All questions answered and patient had no further complaints.    Objective:     Vital Signs (Most Recent):  Temp: 97.6 °F (36.4 °C) (10/16/23 1058)  Pulse: 75 (10/16/23 1058)  Resp: 18 (10/16/23 1058)  BP: 125/68 (10/16/23 1058)  SpO2: 99 % (10/16/23 1058) Vital Signs (24h Range):  Temp:  [96.3 °F (35.7 °C)-98.5 °F (36.9 °C)] 97.6 °F (36.4 °C)  Pulse:  [75-90] 75  Resp:  [16-18] 18  SpO2:  [95 %-99 %] 99 %  BP: (125-139)/(68-92) 125/68     Weight: 40 kg (88 lb 2.9 oz)  Body mass index is 15.14 kg/m².    Intake/Output Summary (Last 24 hours) at 10/16/2023 1419  Last data filed at 10/16/2023 0900  Gross per 24 hour   Intake 160 ml   Output --   Net 160 ml           Physical Exam  Vitals and nursing note reviewed.   Constitutional:       General: She is not in acute distress.     Appearance: She is ill-appearing. She is not toxic-appearing or diaphoretic.      Comments: cachexia   HENT:      Head: Normocephalic and atraumatic.      Nose: Nose normal.      Mouth/Throat:      Mouth: Mucous membranes are moist.   Eyes:      Extraocular Movements: Extraocular movements intact.   Cardiovascular:      Rate and Rhythm: Normal rate and regular rhythm.   Pulmonary:      Effort: Pulmonary effort is normal.      Breath sounds: Normal breath sounds.      Comments: Room air  Abdominal:      General: Bowel sounds are normal.      Palpations: Abdomen is soft.      Tenderness: There is no abdominal tenderness.   Musculoskeletal:      Cervical back: Normal range of motion.      Right lower leg: No edema.      Left lower leg: No edema.   Skin:     General: Skin is warm and dry.      Comments: L AVG site bandaged. L 3rd toe bandaged. L THDC in place   Neurological:      Mental Status: She is alert.      Comments: Alert and  attentive.  Answers a few questions - yes/no.  Diffusely weak             Significant Labs: All pertinent labs within the past 24 hours have been reviewed.    Significant Imaging: I have reviewed all pertinent imaging results/findings within the past 24 hours.

## 2023-10-16 NOTE — PLAN OF CARE
Problem: Infection  Goal: Absence of Infection Signs and Symptoms  Outcome: Ongoing, Progressing     Problem: Adjustment to Illness (Sepsis/Septic Shock)  Goal: Optimal Coping  Outcome: Ongoing, Progressing     Problem: Nutrition Impaired (Sepsis/Septic Shock)  Goal: Optimal Nutrition Intake  Outcome: Ongoing, Progressing     Problem: Skin Injury Risk Increased  Goal: Skin Health and Integrity  Outcome: Ongoing, Progressing

## 2023-10-16 NOTE — ASSESSMENT & PLAN NOTE
10/16/2023   - interval chart reviewed in detail; discussed pt with hospital primary, Dr. Cheney, and CM/JOSHUA, Dandre; GOC/ACP change over the weekend shared by Dr. Cheney, with pt's  wishing to proceed with DNR/DNI over the weekend  - me with pt and sister in law initially, and later was joined by pt's    - reviewed current DNR/DNI status with pt participating in discussion   - opened discussion regarding family's expressed potential interest into home palliative care and explored their understanding of hospice services as they expressed to Dr. Cheney not wanting hospice previously   - discussed in detail services provided and philosophy of care of both home palliative and home hospice; prior barrier to hospice identified as family's prior thought that hospice meant inpatient care; after further discussion of home based hospice and learning that continued HD would also be a potential possibility, pt and family wished to meet with 2 hospice providers to hear plan of care for pt and potential services   - an additional logistic that would need coordination/clarification is transportation to HD for pt; pt's  expressed difficulty and concerns for safety given pt's debility for safely transporting to HD clinic without some sort of coordinated transportation; have shared this concern with both Dr. Cheney and Dandre   - allowed time for questions/concners, emotional support provided   - updated MDT

## 2023-10-16 NOTE — PLAN OF CARE
SW notified [facility] and [facility] of palliative referral.   Dacia ([facility]) plans to meet with patient family today.        10/16/23 1216   Discharge Reassessment   Assessment Type Discharge Planning Reassessment   Discharge Plan discussed with: Patient   Communicated MAVIS with patient/caregiver Yes   Discharge Plan A Home with family;Home Health   Discharge Plan B Home with family;Home Health   DME Needed Upon Discharge  other (see comments)  (TBD)   Transition of Care Barriers None   Why the patient remains in the hospital Requires continued medical care   Post-Acute Status   Coverage Medicare A/B   Discharge Delays None known at this time

## 2023-10-16 NOTE — PROGRESS NOTES
[facility]  Palliative Medicine  Progress Note    Patient Name: Eva Bloom  MRN: [id number]  Admission Date: 10/10/2023  Hospital Length of Stay: 6 days  Code Status: DNR   Attending Provider: Leeroy Cheney MD  Consulting Provider: Leila Munoz NP  Primary Care Physician: Sowmya Mccain MD  Principal Problem:Severe sepsis    Patient information was obtained from patient, spouse/SO and primary team.      Assessment/Plan:   Advance Care Planning     Palliative Care  ACP (advance care planning)  10/16/2023   - interval chart reviewed in detail; discussed pt with hospital primary, Dr. Cheney, and CM/SW, Dandre; GOC/ACP change over the weekend shared by Dr. Cheney, with pt's  wishing to proceed with DNR/DNI over the weekend  - me with pt and sister in law initially, and later was joined by pt's    - reviewed current DNR/DNI status with pt participating in discussion   - opened discussion regarding family's expressed potential interest into home palliative care and explored their understanding of hospice services as they expressed to Dr. Cheney not wanting hospice previously   - discussed in detail services provided and philosophy of care of both home palliative and home hospice; prior barrier to hospice identified as family's prior thought that hospice meant inpatient care; after further discussion of home based hospice and learning that continued HD would also be a potential possibility, pt and family wished to meet with 2 hospice providers to hear plan of care for pt and potential services   - an additional logistic that would need coordination/clarification is transportation to HD for pt; pt's  expressed difficulty and concerns for safety given pt's debility for safely transporting to HD clinic without some sort of coordinated transportation; have shared this concern with both Dr. Cheney and Dandre   - allowed time for questions/concners, emotional support provided    - updated MDT during unit rounds visit     Cardiac/Vascular  Chronic combined systolic and diastolic heart failure  - chronic history noted; EF 35% on most recent echo   - diastolic HF that does cause intermittent SOB at rest and affects exertion/ADLs at times per discussion with family on abilities prior to admit   - pt and  previously with fair insight into life-limiting condition trajectory   -  contributes to appropriateness for hospice if/when aligns with GOC    ICD (implantable cardioverter-defibrillator) in place - SubQ  - noted   - now that GOC have shifted to DNR; de-activation of defibrillator should be discussed prior to discharge home, recommend magnet available until discharge; discussed with Dr. Cheney and CM/Dandre LEWIS     Renal/  ESRD (end stage renal disease) on dialysis  - long term HD pt  - significant kidney disease in family with pt's son (in 30s) also on HD as discussed in previous admission  -  no complications with HD recently and wish to cont HD as long as pt is a candidate   - interested in hospice with HD; however, transportation to and from HD is a logistic that needs assistance from CM/SW and or hospice team and  is concerned on ability to safely transport pt to and from HD   - noted; continued management per nephrology and hospital primary     Oncology  Metastatic renal cell carcinoma to lung, unspecified laterality  - follows up with oncology as outpatient with recent visit 9/2023  - on cabozantinib (immunotherapy) as outpatient +  - pt and family with good insight into pt's progression and that her cancer is not considered curable at this stage; change in GOC over the weekend with consideration of home hospice vs home palliative; see ACP   - hospice qualifying diagnosis; would qualify to continue HD with this diagnosis     Endocrine  Severe protein-calorie malnutrition  - pt with severe protein calorie malnutrition with >12% body weight loss in a little over 2  "months (8/2023: weight was 103 lbs and is now 88lbs);  reports weight has been even lower recently (in 80s)   - current BMI: 15.48; albumin 2.3  - improvement in appetite/food tolerance since admission   - contributes to qualification for hospice if/when aligns with [facility]     I will follow-up with patient. Please contact us if you have any additional questions.     Total visit time: 85 minutes    35 min visit time including: face to face time in discussion of symptom assessment, and exploring options and burdens of offered treatments.  This also includes non-face to face time preparing to see the patient including chart review, obtaining and/or reviewing separately obtained history, documenting clinical information in the electronic or other health record, independently interpreting results and communicating results to the patient/family/caregiver, family discussions by phone if not able to be present, coordination of care with other specialists, and discharge planning.     50 min ACP time spent: goals of care, advanced care planning, emotional support, formulating and communicating prognosis, exploring burden/ benefit of various approaches of treatment.     Leila Munoz NP  Palliative Medicine  [facility]    Subjective:     Chief Complaint:   Chief Complaint   Patient presents with    Weight Loss     Pt;s  reports pt is losing weight and sleeping a lot.  Decreased appetite since x6 days.  PT is a dialysis pt Tu-Th-Sat.         HPI:   From H&P; "Ms Eva Bloom is a 58 y.o. woman with ESRD on HD TTS via [facility], systolic/diastolic CHF, PAD with toe wounds who presents for weight loss.  reports weight loss over last few weeks. Her appetite is poor but stable. No nausea, vomiting, dysphagia, abdominal pain. Reports diarrhea liquid stools 3-4x/day over last week. Does not make urine. She is s/p recent L AVG excision, site is healing well with no drainage or erythema. " "S/p L THDC placement, also working well with no issues. She does have L 3rd toe ulceration present, following with Podiatry. Was prescribed doxycycline after last appointment but never started it. Does have RCC following with Oncology on immunotherapy.      In ED, noted hypothermic and drowsy. Lactic elevated. Started warming blanket and broad antibiotics. Admitted to ICU. "    Palliative medicine consulted for continuity of care, goals of care discussion, and advance care planning; for details of visit, see advance care planning section of plan.         Hospital Course:  No notes on file    Medications:  Continuous Infusions:  Scheduled Meds:   sodium chloride 0.9%   Intravenous Once    apixaban  2.5 mg Oral BID    droNABinol  2.5 mg Oral BID    epoetin david (PROCRIT) injection  4,000 Units Intravenous Every Tues, Thurs, Sat    lactobacillus acidophilus & bulgar  1 packet Oral BID    lanthanum  1,000 mg Oral TID WM    levothyroxine  75 mcg Oral Before breakfast    metoprolol succinate  50 mg Oral Daily    midodrine  5 mg Oral BID WM    sodium chloride 0.9%  10 mL Intravenous Q8H    vitamin renal formula (B-complex-vitamin c-folic acid)  1 capsule Oral Daily     PRN Meds:sodium chloride 0.9%, acetaminophen, dextrose 10%, dextrose 10%, glucagon (human recombinant), glucose, glucose, HYDROcodone-acetaminophen, loperamide, melatonin, naloxone, ondansetron, prochlorperazine, sodium chloride 0.9%    Objective:     Vital Signs (Most Recent):  Temp: 97.6 °F (36.4 °C) (10/16/23 1058)  Pulse: 75 (10/16/23 1058)  Resp: 18 (10/16/23 1058)  BP: 125/68 (10/16/23 1058)  SpO2: 99 % (10/16/23 1058) Vital Signs (24h Range):  Temp:  [96.3 °F (35.7 °C)-98.5 °F (36.9 °C)] 97.6 °F (36.4 °C)  Pulse:  [75-90] 75  Resp:  [16-18] 18  SpO2:  [95 %-99 %] 99 %  BP: (125-139)/(68-92) 125/68     Weight: 40 kg (88 lb 2.9 oz)  Body mass index is 15.14 kg/m².       Physical Exam  Vitals and nursing note reviewed.   Constitutional:       " Appearance: She is cachectic.      Comments: Frail, appears older than age, appears very weak    HENT:      Head: Atraumatic.      Comments: Bilateral temporal wasting   Pulmonary:      Effort: No respiratory distress.   Musculoskeletal:      Comments: Muscular atrophy to extremities    Neurological:      Mental Status: She is oriented to person, place, and time and easily aroused. She is lethargic.      Comments: Oriented; answered questions appropriately   Psychiatric:         Mood and Affect: Affect is flat.     Advance Care Planning  Advance Directives:   Goals of Care: The patient and family endorses that what is most important right now is to focus on spending time at home, avoiding the hospital, remaining as independent as possible, quality of life, even if it means sacrificing a little time, improvement in condition but with limits to invasive therapies, and comfort and QOL     Accordingly, we have decided that the best plan to meet the patient's goals includes continuing with treatment and further discussion of enrolling in hospice care       Significant Labs: All pertinent labs within the past 24 hours have been reviewed.    CBC:   Recent Labs   Lab 10/16/23  0529   WBC 5.52   HGB 10.8*   HCT 35.9*   *        BMP:  Recent Labs   Lab 10/16/23  0529   GLU 70      K 4.6   *   CO2 16*   BUN 16   CREATININE 4.3*   CALCIUM 9.4     LFT:  Lab Results   Component Value Date    AST 74 (H) 10/16/2023     (H) 10/12/2006    ALKPHOS 277 (H) 10/16/2023    BILITOT 0.5 10/16/2023     Albumin:   Albumin   Date Value Ref Range Status   10/16/2023 2.2 (L) 3.5 - 5.2 g/dL Final     Protein:   Total Protein   Date Value Ref Range Status   10/16/2023 5.8 (L) 6.0 - 8.4 g/dL Final     Lactic acid:   Lab Results   Component Value Date    LACTATE 1.6 10/16/2023    LACTATE 2.1 10/15/2023     Significant Imaging: I have reviewed all pertinent imaging results/findings within the past 24  hours.      Leila Munoz NP  Palliative Medicine  [facility] - Telemetry

## 2023-10-16 NOTE — ASSESSMENT & PLAN NOTE
- pt with severe protein calorie malnutrition with >12% body weight loss in a little over 2 months (8/2023: weight was 103 lbs and is now 88lbs);  reports weight has been even lower recently (in 80s)   - current BMI: 15.48; albumin 2.3  - improvement in appetite/food tolerance since admission   - contributes to qualification for hospice if/when aligns with GOC

## 2023-10-16 NOTE — ASSESSMENT & PLAN NOTE
- pt with severe protein calorie malnutrition with >12% body weight loss in a little over 2 months (8/2023: weight was 103 lbs and is now 90lbs);  reports weight has been even lower recently (in 80s)   - current BMI: 15.48; albumin 2.3  - improvement in appetite/food tolerance since admission   - contributes to qualification for hospice if/when aligns with GOC

## 2023-10-16 NOTE — ASSESSMENT & PLAN NOTE
- long term HD pt  - significant kidney disease in family with pt's son (in 30s) also on HD as discussed in previous admission  -  no complications with HD recently and wish to cont HD as long as pt is a candidate   - interested in hospice with HD; however, transportation to and from HD is a logistic that needs assistance from CM/SW and or hospice team and  is concerned on ability to safely transport pt to and from HD   - noted; continued management per nephrology and hospital primary

## 2023-10-16 NOTE — NURSING
[facility]  Nurses Note -- 4 Eyes      10/16/2023       Skin assessed on: Q Shift      [] No Pressure Injuries Present    []Prevention Measures Documented    [x] Yes LDA  for Pressure Injury Previously documented     [] Yes New Pressure Injury Discovered   [] LDA for New Pressure Injury Added      Attending :  Zamzam Blankenship LPN     Second RN:  Elen Cardenas RN

## 2023-10-16 NOTE — NURSING
Patient remained free of falls during shift. Fall/safety precautions addressed with patient. Patient verbalizes understanding. Bed in lowest position, call light and bedside table within reach. Wound care complotted as ordered. Patient turned as tolerated. No acute distress noted.  Bedside report given to AM nurse.

## 2023-10-16 NOTE — NURSING
[facility]  Nurses Note -- 4 Eyes      10/15/2023       Skin assessed on: Q Shift      [] No Pressure Injuries Present    []Prevention Measures Documented    [x] Yes LDA  for Pressure Injury Previously documented   Sacrum Redness, Raw, Bleeding, Foam  Right knee scab x2 Foam  Left Toe Skin tear, open to air,  L upper arm open wound where AV graft is placed. Gauze in place.  [] Yes New Pressure Injury Discovered   [] LDA for New Pressure Injury Added      Attending RN:  Shauna Galvan LPN     Second RN:  Torrie WANG

## 2023-10-16 NOTE — ASSESSMENT & PLAN NOTE
- follows up with oncology as outpatient with recent visit 9/2023  - on cabozantinib (immunotherapy) as outpatient +  - pt and family with good insight into pt's progression and that her cancer is not considered curable at this stage; change in GOC over the weekend with consideration of home hospice vs home palliative; see ACP   - hospice qualifying diagnosis; would qualify to continue HD with this diagnosis

## 2023-10-16 NOTE — SUBJECTIVE & OBJECTIVE
Medications:  Continuous Infusions:  Scheduled Meds:   sodium chloride 0.9%   Intravenous Once    apixaban  2.5 mg Oral BID    droNABinol  2.5 mg Oral BID    epoetin david (PROCRIT) injection  4,000 Units Intravenous Every Tues, Thurs, Sat    lactobacillus acidophilus & bulgar  1 packet Oral BID    lanthanum  1,000 mg Oral TID WM    levothyroxine  75 mcg Oral Before breakfast    metoprolol succinate  50 mg Oral Daily    midodrine  5 mg Oral BID WM    sodium chloride 0.9%  10 mL Intravenous Q8H    vitamin renal formula (B-complex-vitamin c-folic acid)  1 capsule Oral Daily     PRN Meds:sodium chloride 0.9%, acetaminophen, dextrose 10%, dextrose 10%, glucagon (human recombinant), glucose, glucose, HYDROcodone-acetaminophen, loperamide, melatonin, naloxone, ondansetron, prochlorperazine, sodium chloride 0.9%    Objective:     Vital Signs (Most Recent):  Temp: 97.6 °F (36.4 °C) (10/16/23 1058)  Pulse: 75 (10/16/23 1058)  Resp: 18 (10/16/23 1058)  BP: 125/68 (10/16/23 1058)  SpO2: 99 % (10/16/23 1058) Vital Signs (24h Range):  Temp:  [96.3 °F (35.7 °C)-98.5 °F (36.9 °C)] 97.6 °F (36.4 °C)  Pulse:  [75-90] 75  Resp:  [16-18] 18  SpO2:  [95 %-99 %] 99 %  BP: (125-139)/(68-92) 125/68     Weight: 40 kg (88 lb 2.9 oz)  Body mass index is 15.14 kg/m².       Physical Exam  Vitals and nursing note reviewed.   Constitutional:       Appearance: She is cachectic.      Comments: Frail, appears older than age, appears very weak    HENT:      Head: Atraumatic.      Comments: Bilateral temporal wasting   Pulmonary:      Effort: No respiratory distress.   Musculoskeletal:      Comments: Muscular atrophy to extremities    Neurological:      Mental Status: She is oriented to person, place, and time and easily aroused. She is lethargic.      Comments: Oriented; answered questions appropriately   Psychiatric:         Mood and Affect: Affect is flat.     Advance Care Planning   Advance Directives:   Goals of Care: The patient and family  endorses that what is most important right now is to focus on spending time at home, avoiding the hospital, remaining as independent as possible, quality of life, even if it means sacrificing a little time, improvement in condition but with limits to invasive therapies, and comfort and QOL     Accordingly, we have decided that the best plan to meet the patient's goals includes continuing with treatment and further discussion of enrolling in hospice care       Significant Labs: All pertinent labs within the past 24 hours have been reviewed.    CBC:   Recent Labs   Lab 10/16/23  0529   WBC 5.52   HGB 10.8*   HCT 35.9*   *        BMP:  Recent Labs   Lab 10/16/23  0529   GLU 70      K 4.6   *   CO2 16*   BUN 16   CREATININE 4.3*   CALCIUM 9.4     LFT:  Lab Results   Component Value Date    AST 74 (H) 10/16/2023     (H) 10/12/2006    ALKPHOS 277 (H) 10/16/2023    BILITOT 0.5 10/16/2023     Albumin:   Albumin   Date Value Ref Range Status   10/16/2023 2.2 (L) 3.5 - 5.2 g/dL Final     Protein:   Total Protein   Date Value Ref Range Status   10/16/2023 5.8 (L) 6.0 - 8.4 g/dL Final     Lactic acid:   Lab Results   Component Value Date    LACTATE 1.6 10/16/2023    LACTATE 2.1 10/15/2023     Significant Imaging: I have reviewed all pertinent imaging results/findings within the past 24 hours.

## 2023-10-16 NOTE — ASSESSMENT & PLAN NOTE
-Body mass index is 15.14 kg/m².  -Noted unintentional weight loss  -Nutrition consulted  -Did not tolerate higher dose of dronabinol - was too lethargic.    -Agree with cutting back dose.  Ate 25% of breakfast sandwich today

## 2023-10-16 NOTE — ASSESSMENT & PLAN NOTE
-Admitted to inpatient status  -On admit hypothermic without tachycardia, leukocytosis but with lactic acidosis and mildly elevated procalcitonin  -CT chest/abd/pelvis- no new source of infection noted  -CT L foot shows no evidence for osteomyelitis  -Organ dysfunction indicated by Encephalopathy  -Blood cultures negative  -C.diff negative.  No fecal WBC. Giardia and crypto Ag negative.  Stool culture unremarkable.  -Note she is s/p AVG excision 9/28/23 for infection.   -Source of sepsis is unclear - had diarrhea and noted skin/soft tissue wound on foot  -Podiatry consulted to evaluate foot wounds- appears stable, no signs of cellulitis - continue wound care  -Unclear if this is actual sepsis or SIRS due to non-infectious etiology  -Consulted ID and input appreciated  -Noted AM cortisol was borderline - did cosyntropin stim test 10/13 with peak response 17.2.  For her diminished body habitus this is a satisfactory response and I doubt adrenal insufficiency.  -No convincing evidence of active infection after extensive workup.  Completed 5 day course of of IV antibiotics per ID recs.  -On 10/15 was again hypothermic and lethargic requiring warming blanket.  Today euthermic and alert but still VERY weak.  -Suspect this is SIRS due to advancing cancer and severe malnutrition  -Patient now DNR and we are pursuing home hospice vs palliative care with ongoing dialysis.  CM consulted to arrange family meeting with 2 different agencies.  If hospice is chosen, will need to deactivate AICD (not pacer) prior to discharge.

## 2023-10-16 NOTE — ASSESSMENT & PLAN NOTE
Advance Care Planning    Patient remains full code, but is hospice appropriate.  Palliative care consulted and input appreciated.  Continue active GOC discussions.  -Remains full code  -10/15/23 spoke with  at bedside regarding my fear she may be actively dying.  Discussed pros and cons of ACLS treatment and in her case the risk:benefit ratio favors DNR status.  Further, I recommended a transition to focus on comfort care with home based palliative or hospice care.  Her  asked that I speak with his sister, Jose De Jesus, who I met with in the afternoon.  I discussed our fears of her advancing cancer, severe malnutrition, inability to maintain body temperature, severe debility, severe chf, skin breakdown and pre-existing ESRD requiring dialysis.  I shared the same recommendations with her.  She is in favor of DNR status and will discuss with her brother and nephews.  I gave them my cellphone number and asked them to let me know if they have questions or make a decision to change code status or pivot to comfort based care.  I spent 45 minutes ACP 10/15/23.  -CM consulted 10/16 to arrange for family meeting with 2 different agencies.  Unclear if they will choose palliative or hospice care at discharge.  Appreciate assistance of palliative care team.

## 2023-10-16 NOTE — ASSESSMENT & PLAN NOTE
-On admit Hb 10.7 with MCV > 100  -Hb now 10.8  -No evidence of bleeding  -B12 and folate normal when last checked. TSAT appropriate  -EPO with HD

## 2023-10-16 NOTE — PLAN OF CARE
Dacia ([facility]) met with patient spouse and patient at bedside. Dacia explained palliative services to patient spouse and patient and explained that [facility] would be a better fit if patient continue with dialysis long term with hospice services.

## 2023-10-16 NOTE — PT/OT/SLP PROGRESS
Physical Therapy      Patient Name:  Eva Bloom   MRN:  [id number]    Patient not seen today secondary to Bowel/bladder accident, with nursing at bedside Will follow-up as able.

## 2023-10-16 NOTE — PT/OT/SLP PROGRESS
Occupational Therapy   Treatment    Name: Eva Bloom  MRN: [id number]  Admitting Diagnosis:  Severe sepsis       Recommendations:     Discharge Recommendations: Low Intensity Therapy  Discharge Equipment Recommendations:  wheelchair, bedside commode, bath bench  Barriers to discharge:  None    Assessment:     Eva Bloom is a 58 y.o. female with a medical diagnosis of Severe sepsis.  Performance deficits affecting function are weakness, impaired endurance, impaired self care skills, impaired functional mobility, gait instability, impaired balance, decreased upper extremity function, decreased lower extremity function, decreased safety awareness, impaired skin.     Pt pleasant and willing to participate in tx session this date w/ min encouragement; pt required frequent rest due to fatigue but was able to ambulate a short distances w/ close CGA and use of RW. Pt completed BUE AROM at EOB for increasing overall strength and endurance for safe performance w/ ADLs and functional mobility.     Rehab Prognosis:  Good; patient would benefit from acute skilled OT services to address these deficits and reach maximum level of function.       Plan:     Patient to be seen  (3-5x/wk) to address the above listed problems via self-care/home management, therapeutic activities, therapeutic exercises  Plan of Care Expires: 10/27/23  Plan of Care Reviewed with: patient, spouse    Subjective     Chief Complaint: Weakness; BLE pain w/ ambulation   Patient/Family Comments/goals: Agreeable to participate  Pain/Comfort:  Pain Rating 1:  (Pt did not rate.)  Location - Side 2: Bilateral  Location 2: thigh  Pain Addressed 2: Reposition, Distraction, Cessation of Activity    Objective:     Communicated with: Nurse prior to session.  Patient found HOB elevated with peripheral IV, telemetry upon OT entry to room.    General Precautions: Standard, fall    Orthopedic Precautions:N/A  Braces: N/A  Respiratory Status: Room air      Occupational Performance:     Bed Mobility:    Patient completed Scooting/Bridging with contact guard assistance  Patient completed Supine to Sit with contact guard assistance and minimum assistance  Patient completed Sit to Supine with contact guard assistance     Functional Mobility/Transfers:  Patient completed Sit <> Stand Transfer x 2 trials from EOB with minimum assistance  with  rolling walker; pt required v/t cueing for safe hand placement   Functional Mobility: Pt was able to ambulate ~4 ft forward w/ min A-CGA but needed to turn around and return to bed 2* B thigh pain.     Activities of Daily Living:  Upper Body Dressing: minimum assistance for donning gown over back     [facility] 6 Click ADL: 15    Treatment & Education:  -Pt re-educated on role of OT and POC.   -Pt re-educated on safe functional mobility and ADL performance.  -Pt performed the following for 1 set x 10 reps w/ encouragement and rest breaks as needed for increasing overall strength and ROM for safe performance w/ ADLs and functional mobility:    -shoulder flexion   -elbow flex/ext    -shoulder abd/add    -forward punches    -scapular elevation   -Questions and concerns addressed within scope.     Patient left HOB elevated with all lines intact and call button in reach    GOALS:   Multidisciplinary Problems       Occupational Therapy Goals          Problem: Occupational Therapy    Goal Priority Disciplines Outcome Interventions   Occupational Therapy Goal     OT, PT/OT Ongoing, Progressing    Description: Goals to be met by: 10/27/23     Patient will increase functional independence with ADLs by performing:    Grooming while standing at sink (w/ seated rest breaks) with Stand-by Assistance.  Toileting from toilet with Stand-by Assistance for hygiene and clothing management.   Sit to stand with Stand-by Assistance.   Step transfer with Stand-by Assistance  Toilet transfer to toilet with Stand-by Assistance.  Upper extremity exercise program  x15 reps per handout, with independence.                         Time Tracking:     OT Date of Treatment: 10/16/23  OT Start Time: 1600  OT Stop Time: 1624  OT Total Time (min): 24 min    Billable Minutes:Therapeutic Activity 12  Therapeutic Exercise 12  Total Time 24    OT/CORNELL: OT          10/16/2023

## 2023-10-16 NOTE — PLAN OF CARE
Problem: Physical Therapy  Goal: Physical Therapy Goal  Description: Goals to be met by: 10/27/23     Patient will increase functional independence with mobility by performin. Supine to sit with Stand-by Assistance  2. Rolling to Left and Right with Stand-by Assistance  3. Sit to stand transfer with Stand-by Assistance using RW  4. Bed to chair transfer with Stand-by Assistance using Rolling Walker  5. Gait x50 feet with Stand-by Assistance using Rolling Walker   6. Wheelchair propulsion x50 feet with Stand-by Assistance using bilateral upper/lower extremities  7. Lower extremity exercise program x10 reps per handout, with SBA    Outcome: Ongoing, Progressing         Pt in bed with family at bedside upon arrival. Pt stated she wants to sit EOB for therapy.

## 2023-10-17 LAB
ACTH PLAS-MCNC: 227 PG/ML (ref 0–46)
ALBUMIN SERPL BCP-MCNC: 2.1 G/DL (ref 3.5–5.2)
ALP SERPL-CCNC: 239 U/L (ref 55–135)
ALT SERPL W/O P-5'-P-CCNC: 19 U/L (ref 10–44)
ANION GAP SERPL CALC-SCNC: 13 MMOL/L (ref 8–16)
AST SERPL-CCNC: 34 U/L (ref 10–40)
BILIRUB SERPL-MCNC: 0.6 MG/DL (ref 0.1–1)
BUN SERPL-MCNC: 21 MG/DL (ref 6–20)
CALCIUM SERPL-MCNC: 9.6 MG/DL (ref 8.7–10.5)
CHLORIDE SERPL-SCNC: 114 MMOL/L (ref 95–110)
CO2 SERPL-SCNC: 17 MMOL/L (ref 23–29)
CREAT SERPL-MCNC: 5.7 MG/DL (ref 0.5–1.4)
EST. GFR  (NO RACE VARIABLE): 8 ML/MIN/1.73 M^2
GLUCOSE SERPL-MCNC: 71 MG/DL (ref 70–110)
POTASSIUM SERPL-SCNC: 3.6 MMOL/L (ref 3.5–5.1)
PROT SERPL-MCNC: 5.4 G/DL (ref 6–8.4)
SODIUM SERPL-SCNC: 144 MMOL/L (ref 136–145)

## 2023-10-17 PROCEDURE — 25000003 PHARM REV CODE 250: Performed by: HOSPITALIST

## 2023-10-17 PROCEDURE — 99233 SBSQ HOSP IP/OBS HIGH 50: CPT | Mod: GW,HPC,, | Performed by: REGISTERED NURSE

## 2023-10-17 PROCEDURE — 63600175 PHARM REV CODE 636 W HCPCS: Performed by: INTERNAL MEDICINE

## 2023-10-17 PROCEDURE — 21400001 HC TELEMETRY ROOM

## 2023-10-17 PROCEDURE — 99497 ADVNCD CARE PLAN 30 MIN: CPT | Mod: GW,HPC,, | Performed by: REGISTERED NURSE

## 2023-10-17 PROCEDURE — 36415 COLL VENOUS BLD VENIPUNCTURE: CPT | Performed by: HOSPITALIST

## 2023-10-17 PROCEDURE — 99497 PR ADVNCD CARE PLAN 30 MIN: ICD-10-PCS | Mod: GW,HPC,, | Performed by: REGISTERED NURSE

## 2023-10-17 PROCEDURE — A4216 STERILE WATER/SALINE, 10 ML: HCPCS | Performed by: HOSPITALIST

## 2023-10-17 PROCEDURE — 99233 PR SUBSEQUENT HOSPITAL CARE,LEVL III: ICD-10-PCS | Mod: GW,HPC,, | Performed by: REGISTERED NURSE

## 2023-10-17 PROCEDURE — 80053 COMPREHEN METABOLIC PANEL: CPT | Performed by: HOSPITALIST

## 2023-10-17 PROCEDURE — 99498 ADVNCD CARE PLAN ADDL 30 MIN: CPT | Mod: GW,HPC,, | Performed by: REGISTERED NURSE

## 2023-10-17 PROCEDURE — 99498 PR ADVNCD CARE PLAN ADDL 30 MIN: ICD-10-PCS | Mod: GW,HPC,, | Performed by: REGISTERED NURSE

## 2023-10-17 PROCEDURE — 80100016 HC MAINTENANCE HEMODIALYSIS

## 2023-10-17 PROCEDURE — 25000003 PHARM REV CODE 250: Performed by: INTERNAL MEDICINE

## 2023-10-17 RX ADMIN — APIXABAN 2.5 MG: 2.5 TABLET, FILM COATED ORAL at 08:10

## 2023-10-17 RX ADMIN — LEVOTHYROXINE SODIUM 75 MCG: 75 TABLET ORAL at 06:10

## 2023-10-17 RX ADMIN — MIDODRINE HYDROCHLORIDE 5 MG: 5 TABLET ORAL at 07:10

## 2023-10-17 RX ADMIN — ERYTHROPOIETIN 4000 UNITS: 4000 INJECTION, SOLUTION INTRAVENOUS; SUBCUTANEOUS at 09:10

## 2023-10-17 RX ADMIN — DRONABINOL 2.5 MG: 2.5 CAPSULE ORAL at 09:10

## 2023-10-17 RX ADMIN — LACTOBACILLUS ACIDOPHILUS / LACTOBACILLUS BULGARICUS 1 EACH: 100 MILLION CFU STRENGTH GRANULES at 08:10

## 2023-10-17 RX ADMIN — Medication 6 MG: at 09:10

## 2023-10-17 RX ADMIN — NEPHROCAP 1 CAPSULE: 1 CAP ORAL at 08:10

## 2023-10-17 RX ADMIN — LOPERAMIDE HYDROCHLORIDE 2 MG: 2 CAPSULE ORAL at 09:10

## 2023-10-17 RX ADMIN — MIDODRINE HYDROCHLORIDE 5 MG: 5 TABLET ORAL at 05:10

## 2023-10-17 RX ADMIN — LACTOBACILLUS ACIDOPHILUS / LACTOBACILLUS BULGARICUS 1 EACH: 100 MILLION CFU STRENGTH GRANULES at 09:10

## 2023-10-17 RX ADMIN — LANTHANUM CARBONATE 1000 MG: 500 TABLET, CHEWABLE ORAL at 08:10

## 2023-10-17 RX ADMIN — LANTHANUM CARBONATE 1000 MG: 500 TABLET, CHEWABLE ORAL at 05:10

## 2023-10-17 RX ADMIN — HYDROCODONE BITARTRATE AND ACETAMINOPHEN 1 TABLET: 5; 325 TABLET ORAL at 09:10

## 2023-10-17 RX ADMIN — Medication 10 ML: at 10:10

## 2023-10-17 RX ADMIN — APIXABAN 2.5 MG: 2.5 TABLET, FILM COATED ORAL at 09:10

## 2023-10-17 NOTE — PROGRESS NOTES
[facility] - Telemetry  Palliative Medicine  Progress Note    Patient Name: Eva Bloom  MRN: [id number]  Admission Date: 10/10/2023  Hospital Length of Stay: 7 days  Code Status: DNR   Attending Provider: Danilo Olivier MD  Consulting Provider: Leila Munoz NP  Primary Care Physician: Sowmya Mccain MD  Principal Problem:Severe sepsis    Patient information was obtained from patient, spouse/SO and primary team.      Assessment/Plan:   Advance Care Planning     Cardiac/Vascular  Chronic combined systolic and diastolic heart failure  - chronic history noted; EF 35% on most recent echo   - diastolic HF that does cause intermittent SOB at rest and affects exertion/ADLs at times per discussion with family on abilities prior to admit   - pt and  previously with fair insight into life-limiting condition trajectory   -  contributes to appropriateness for hospice if/when aligns with GOC    ICD (implantable cardioverter-defibrillator) in place - SubQ  - noted   - now that GOC have shifted to DNR; de-activation of defibrillator should be discussed prior to discharge home, recommend magnet available until discharge; discussed with Dr. Cheney and CEZAR/Dandre LEWIS       Renal/  ESRD (end stage renal disease) on dialysis  - long term HD pt  - significant kidney disease in family with pt's son (in 30s) also on HD as discussed in previous admission  -  no complications with HD recently and wish to cont HD as long as pt is a candidate   - interested in hospice with HD; however, transportation to and from HD is a logistic that needs assistance from CM/JOSHUA and or hospice team and  is concerned on ability to safely transport pt to and from HD   - noted; continued management per nephrology and hospital primary     Oncology  Metastatic renal cell carcinoma to lung, unspecified laterality  - follows up with oncology as outpatient with recent visit 9/2023  - on cabozantinib (immunotherapy) as outpatient +  - pt and  family with good insight into pt's progression and that her cancer is not considered curable at this stage; change in GOC over the weekend with consideration of home hospice vs home palliative; see ACP   - hospice qualifying diagnosis; would qualify to continue HD with this diagnosis     Endocrine  Severe protein-calorie malnutrition  - pt with severe protein calorie malnutrition with >12% body weight loss in a little over 2 months (8/2023: weight was 103 lbs and is now 88lbs);  reports weight has been even lower recently (in 80s)   - current BMI: 15.48; albumin 2.3  - improvement in appetite/food tolerance since admission   - contributes to qualification for hospice if/when aligns with GOC       Palliative Care  ACP (advance care planning)  10/17/2023  - interval chart reviewed; discussed pt with CM/SW and potential hospice providers  - plan for pt/family to meet with Shauna, from [facility] this afternoon   - following discussion with [facility], family wished to discussed longer term HD plan with [facility]   - me with pt in dialysis suite today; continued improvement in alertness and participation in discussion; pt continues to express readiness to return home and continued GOC for Hospice with HD   - later attempted to revisit pt in room, follow up with ; he was not at bedside at that time; later called to follow up on plan for hospice today and to share pt's request to discharge home today if possible and things go well with [facility] discussion   - continued plan to meet with [facility] today;  hopeful for agreement in plan of care to get pt home soon  - later revisited to discuss in person recommendation for deactivation of defibrillator prior to discharge; encouraged discussion with hospice representative on including this in plan of care ( I also shared this information with Shauna with [facility])   - emotional support provided; allowed time for questions/concerns, all  addressed       10/16/2023   - interval chart reviewed in detail; discussed pt with hospital primary, Dr. Cheney, and CM/SW, Dandre; GOC/ACP change over the weekend shared by Dr. Cheney, with pt's  wishing to proceed with DNR/DNI over the weekend  - me with pt and sister in law initially, and later was joined by pt's    - reviewed current DNR/DNI status with pt participating in discussion   - opened discussion regarding family's expressed potential interest into home palliative care and explored their understanding of hospice services as they expressed to Dr. Cheney not wanting hospice previously   - discussed in detail services provided and philosophy of care of both home palliative and home hospice; prior barrier to hospice identified as family's prior thought that hospice meant inpatient care; after further discussion of home based hospice and learning that continued HD would also be a potential possibility, pt and family wished to meet with 2 hospice providers to hear plan of care for pt and potential services   - an additional logistic that would need coordination/clarification is transportation to HD for pt; pt's  expressed difficulty and concerns for safety given pt's debility for safely transporting to HD clinic without some sort of coordinated transportation; have shared this concern with both Dr. Cheney and Dandre   - allowed time for questions/concners, emotional support provided   - updated MDT         I will follow-up with patient. Please contact us if you have any additional questions.     Total visit time: 85 minutes    35 min visit time including: face to face time in discussion of symptom assessment, and exploring options and burdens of offered treatments.  This also includes non-face to face time preparing to see the patient including chart review, obtaining and/or reviewing separately obtained history, documenting clinical information in the electronic or other health  "record, independently interpreting results and communicating results to the patient/family/caregiver, family discussions by phone if not able to be present, coordination of care with other specialists, and discharge planning.     50 min ACP time spent: goals of care, advanced care planning, emotional support, formulating and communicating prognosis, exploring burden/ benefit of various approaches of treatment.     Leila Munoz NP  Palliative Medicine  [facility]  Subjective:     Chief Complaint:   Chief Complaint   Patient presents with    Weight Loss     Pt;s  reports pt is losing weight and sleeping a lot.  Decreased appetite since x6 days.  PT is a dialysis pt Tu-Th-Sat.         HPI:   From H&P; "Ms Eva Bloom is a 58 y.o. woman with ESRD on HD TTS via [facility]DC, systolic/diastolic CHF, PAD with toe wounds who presents for weight loss.  reports weight loss over last few weeks. Her appetite is poor but stable. No nausea, vomiting, dysphagia, abdominal pain. Reports diarrhea liquid stools 3-4x/day over last week. Does not make urine. She is s/p recent L AVG excision, site is healing well with no drainage or erythema. S/p L THDC placement, also working well with no issues. She does have L 3rd toe ulceration present, following with Podiatry. Was prescribed doxycycline after last appointment but never started it. Does have RCC following with Oncology on immunotherapy.      In ED, noted hypothermic and drowsy. Lactic elevated. Started warming blanket and broad antibiotics. Admitted to ICU. "    Palliative medicine consulted for continuity of care, goals of care discussion, and advance care planning; for details of visit, see advance care planning section of plan.         Hospital Course:  No notes on file    Medications:  Continuous Infusions:  Scheduled Meds:   sodium chloride 0.9%   Intravenous Once    apixaban  2.5 mg Oral BID    droNABinol  2.5 mg Oral BID    " epoetin david (PROCRIT) injection  4,000 Units Intravenous Every Tues, Thurs, Sat    lactobacillus acidophilus & bulgar  1 packet Oral BID    lanthanum  1,000 mg Oral TID WM    levothyroxine  75 mcg Oral Before breakfast    metoprolol succinate  50 mg Oral Daily    midodrine  5 mg Oral BID WM    sodium chloride 0.9%  10 mL Intravenous Q8H    vitamin renal formula (B-complex-vitamin c-folic acid)  1 capsule Oral Daily     PRN Meds:sodium chloride 0.9%, acetaminophen, dextrose 10%, dextrose 10%, glucagon (human recombinant), glucose, glucose, HYDROcodone-acetaminophen, loperamide, melatonin, naloxone, ondansetron, prochlorperazine, sodium chloride 0.9%    Objective:     Vital Signs (Most Recent):  Temp: 97.7 °F (36.5 °C) (10/17/23 1340)  Pulse: 79 (10/17/23 1340)  Resp: 19 (10/17/23 1340)  BP: 106/62 (10/17/23 1340)  SpO2: 97 % (10/17/23 1340) Vital Signs (24h Range):  Temp:  [96 °F (35.6 °C)-98 °F (36.7 °C)] 97.7 °F (36.5 °C)  Pulse:  [70-88] 79  Resp:  [16-20] 19  SpO2:  [97 %-100 %] 97 %  BP: ()/(56-99) 106/62     Weight: 40 kg (88 lb 2.9 oz)  Body mass index is 15.14 kg/m².       Physical Exam  Vitals and nursing note reviewed.   Constitutional:       General: She is awake.      Appearance: She is cachectic.      Comments: Frail, appears older than age   HENT:      Head: Atraumatic.      Comments: Bilateral temporal wasting   Pulmonary:      Effort: No respiratory distress.   Musculoskeletal:      Comments: Muscular atrophy to extremities    Neurological:      Mental Status: She is oriented to person, place, and time. She is lethargic.      Comments: Oriented; answered questions appropriately; improved mental status and alertness compared to admit; more participatory in discussions   Psychiatric:         Mood and Affect: Affect is flat.         Behavior: Behavior is cooperative.     Advance Care Planning  Advance Directives:   Goals of Care: What is most important right now is to focus on spending time  at home, avoiding the hospital, remaining as independent as possible, quality of life, even if it means sacrificing a little time, improvement in condition but with limits to invasive therapies, comfort and QOL . Accordingly, we have decided that the best plan to meet the patient's goals includes continuing with treatment and discussing option of palliative vs hospice at home.     Significant Labs: All pertinent labs within the past 24 hours have been reviewed.    CBC:   Recent Labs   Lab 10/16/23  0529   WBC 5.52   HGB 10.8*   HCT 35.9*   *          BMP:  Recent Labs   Lab 10/17/23  0505   GLU 71      K 3.6   *   CO2 17*   BUN 21*   CREATININE 5.7*   CALCIUM 9.6       LFT:  Lab Results   Component Value Date    AST 34 10/17/2023     (H) 10/12/2006    ALKPHOS 239 (H) 10/17/2023    BILITOT 0.6 10/17/2023     Albumin:   Albumin   Date Value Ref Range Status   10/17/2023 2.1 (L) 3.5 - 5.2 g/dL Final     Protein:   Total Protein   Date Value Ref Range Status   10/17/2023 5.4 (L) 6.0 - 8.4 g/dL Final     Lactic acid:   Lab Results   Component Value Date    LACTATE 1.6 10/16/2023    LACTATE 2.1 10/15/2023     Significant Imaging: I have reviewed all pertinent imaging results/findings within the past 24 hours.      Leila Munoz NP  Palliative Medicine  [facility]

## 2023-10-17 NOTE — PLAN OF CARE
Problem: Adult Inpatient Plan of Care  Goal: Plan of Care Review  Outcome: Ongoing, Progressing  Goal: Patient-Specific Goal (Individualized)  Outcome: Ongoing, Progressing  Goal: Absence of Hospital-Acquired Illness or Injury  Outcome: Ongoing, Progressing  Goal: Optimal Comfort and Wellbeing  Outcome: Ongoing, Progressing  Goal: Readiness for Transition of Care  Outcome: Ongoing, Progressing     Problem: Infection  Goal: Absence of Infection Signs and Symptoms  Outcome: Ongoing, Progressing     Problem: Adjustment to Illness (Sepsis/Septic Shock)  Goal: Optimal Coping  Outcome: Ongoing, Progressing     Problem: Skin Injury Risk Increased  Goal: Skin Health and Integrity  Outcome: Ongoing, Progressing     Problem: Infection (Hemodialysis)  Goal: Absence of Infection Signs and Symptoms  Outcome: Ongoing, Progressing     Problem: Fall Injury Risk  Goal: Absence of Fall and Fall-Related Injury  Outcome: Ongoing, Progressing

## 2023-10-17 NOTE — PLAN OF CARE
JOSHUA spoke with Shauna ([facility]) re: palliative meeting with family. Shauna stated she will run patient benefits and come by to meet with patient family.   Shauna ([facility]) [phone].    JOSHUA spoke with Shauna ([facility]) confirming patient was accepted for palliative care, and patient has a 20 dollar copayment.

## 2023-10-17 NOTE — PHYSICIAN QUERY
PT Name: Eva Bloom  MR #: [id number]     DOCUMENTATION CLARIFICATION     CDS/: Mariia Lowe RN BSN              Contact information: [email or web address]   This form is a permanent document in the medical record.     Query Date: October 17, 2023    By submitting this query, we are merely seeking further clarification of documentation.  Please utilize your independent clinical judgment when addressing the question(s) below.  Provider, please provide the integumentary diagnosis related to the documentation of Left heel:   The Medical Record contains the following:    10/10 Podiatry consult  Fat pad atrophy to heels  Area of pressure to the plantar left heel    Nursing instructed to ensure patient has heel protector boots on given the areas of pressure already present to the left lower extremity that we do not want to regress during admission.                  LDA 10/10/23 2000 Left distal Heel             The clinical guidelines noted are only a system guideline. It does not replace the providers clinical judgment.    Per the National Pressure Injury Advisory Panel:   A pressure injury is localized damage to the skin and underlying soft tissue usually over a bony prominence or related to a medical or other device. The injury can present as intact skin or an open ulcer and may be painful. The injury occurs as a result of intense and/or prolonged pressure or pressure in combination with shear. The tolerance of soft tissue for pressure and shear may also be affected by microclimate, nutrition, perfusion, co-morbidities and condition of the soft tissue.       Stage 1 Pressure Injury:  Intact skin with a localized area of non-blanchable erythema, which may appear differently in darkly pigmented skin. Color changes do not include purple or maroon discoloration; these may indicate deep tissue pressure injury.    Stage 2 Pressure Injury:  Partial-thickness loss of skin with exposed dermis. The wound bed is  viable, pink or red, moist, and may also present as an intact or ruptured serum-filled blister.    Stage 3 Pressure Injury:  Full-thickness loss of skin, in which adipose (fat) is visible in the ulcer and granulation tissue and epibole (rolled wound edges) are often present. Slough and/or eschar may be visible. Undermining and tunneling may occur.    Stage 4 Pressure Injury:  Full-thickness skin and tissue loss with exposed or directly palpable fascia, muscle, tendon, ligament, cartilage or bone in the ulcer. Slough and/or eschar may be visible. Epibole (rolled edges), undermining and/or tunneling often occur.    Unstageable Pressure Injury:  Full-thickness skin and tissue loss in which the extent of tissue damage within the ulcer cannot be confirmed because it is obscured by slough or eschar. If slough or eschar is removed, a Stage 3 or Stage 4 pressure injury will be revealed.    Deep Tissue Pressure Injury:  Intact or non-intact skin with localized area of persistent non-blanchable deep red, maroon, purple discoloration or epidermal separation revealing a dark wound bed or blood filled blister. This injury results from intense and/or prolonged pressure and shear forces at the bone-muscle interface. The wound may evolve rapidly to reveal the actual extent of tissue injury, or may resolve without tissue loss. If necrotic tissue, subcutaneous tissue, granulation tissue, fascia, muscle or other underlying structures are visible, this indicates a full thickness pressure injury (Unstageable, Stage 3 or Stage 4). Do not use DTPI to describe vascular, traumatic, neuropathic, or dermatologic conditions.   Medical Device Related Pressure Injury: This describes an etiology. Medical device related pressure injuries result from the use of devices designed and applied for diagnostic or therapeutic purposes. The resultant pressure injury generally conforms to the pattern or shape of the device. The injury should be staged using  the staging system.    Mucosal Membrane Pressure Injury: Mucosal membrane pressure injury is found on mucous membranes with a history of a medical device in use at the location of the injury. Due to the anatomy of the tissue these ulcers cannot be staged.       Provider, please provide the integumentary diagnosis related to the documentation of left heel:     [  X ] Pressure Injury/Decubitus Ulcer, Stage 1       [   ] Other Integumentary Diagnosis (please specify):______________       Please document in your progress notes daily for the duration of treatment until resolved and include in your discharge summary.    Reference:    LORI Bell., CANDIDO Michael., Goldberg, M., WOOD Candelaria., WOOD Moss., & JESUS MANUEL Reed. (2016). Revised National Pressure Ulcer Advisory Panel Pressure Injury Staging System: Revised Pressure Injury Staging System. J Wound Ostomy Continence Nurs, 43(6), 585-597. doi:10.1097/[email or web address]    Form No.85901

## 2023-10-17 NOTE — PROGRESS NOTES
[facility] Medicine  Progress Note    Patient Name: Eva Bloom  MRN: [id number]  Patient Class: IP- Inpatient   Admission Date: 10/10/2023  Length of Stay: 7 days  Attending Physician: Danilo Olivier MD  Primary Care Provider: Sowmya Mccain MD        Subjective:     Principal Problem:Severe sepsis        HPI:  Ms Eva Bloom is a 58 y.o. woman with ESRD on HD TTS via THDC, systolic/diastolic CHF, PAD with toe wounds who presents for weight loss.  reports weight loss over last few weeks. Her appetite is poor but stable. No nausea, vomiting, dysphagia, abdominal pain. Reports diarrhea liquid stools 3-4x/day over last week. Does not make urine. She is s/p recent L AVG excision, site is healing well with no drainage or erythema. S/p L THDC placement, also working well with no issues. She does have L 3rd toe ulceration present, following with Podiatry. Was prescribed doxycycline after last appointment but never started it. Does have RCC following with Oncology on immunotherapy.     In ED, noted hypothermic and drowsy. Lactic elevated. Started warming blanket and broad antibiotics. Admitted to ICU.       Overview/Hospital Course:  58 year old woman with ESRD (HD TTS), recent L AVG excision, RCC s/p nephrectomy, pAFIB, hypothyroidism, systolic/diastolic chf, PAD with toe wounds who presented for evaluation of unintentional weight loss.  She was admitted to inpatient status and diagnosed with SIRS, metastatic RCC to lung and pancreas, severe malnutrition.  Initially treated in Icu with broad spectrum antibiotics but no definitive souce of infection identified.  ID consulted and completed IV antibiotics per ID.  Again with hypothermia and severe lethargy 10/15 which resolved with warming blanket.  Adrenal response is normal.  This appears to be SIRS due to advancing cancer and severe malnutrition.  She is now DNR and  is pursuing hospice vs home palliative care.  Setting up  meeting for him with 2 different agencies.  He does wish to continue with dialysis.  If chooses hospice, will need to deactivate AICD prior to discharge.      Interval History:  NAEON.  No new issues.   Denies complaints.  All questions answered and updates on care given.       ROS:  General: Negative for fevers or chills.  Cardiac: Negative for chest pain or orthopnea   Pulmonary: Negative for dyspnea or wheezing.  GI: Negative for abdominal distention or pain     Vitals:    10/16/23 2324 10/17/23 0500 10/17/23 0600 10/17/23 0730   BP: (!) 139/99 135/83 135/83 (!) 138/91   BP Location: Right arm  Right arm    Patient Position: Lying  Lying    Pulse: 70 77 77 85   Resp: 18 18 16 18   Temp: 97.7 °F (36.5 °C) 97.6 °F (36.4 °C) 97.6 °F (36.4 °C) 97.8 °F (36.6 °C)   TempSrc: Axillary Axillary Axillary    SpO2: 100% 99% 99% 99%   Weight:       Height:              Body mass index is 15.14 kg/m².      PHYSICAL EXAM:  GENERAL APPEARANCE: alert and cooperative, frail  HEENT:     HEAD: NC/AT     EYES: PERRL, EOMI.  Vision is grossly intact.  NECK: Neck supple, non-tender without LAD, masses or thyromegaly.  CARDIAC: There is no peripheral edema, cyanosis or pallor.   LUNGS: Clear to auscultation and percussion without rales or wheezing  ABDOMEN: Non-distended. No guarding.  MSK: No joint erythema or tenderness.   EXTREMITIES: No significant deformity or joint abnormality. No edema.   NEUROLOGICAL: CN II-XII grossly intact.   SKIN: Skin normal color, texture and turgor with no lesions or eruptions.  +THDC  PSYCHIATRIC: Oriented. No tangential speech. No Hyperactive features.        Recent Results (from the past 24 hour(s))   Comprehensive Metabolic Panel    Collection Time: 10/17/23  5:05 AM   Result Value Ref Range    Sodium 144 136 - 145 mmol/L    Potassium 3.6 3.5 - 5.1 mmol/L    Chloride 114 (H) 95 - 110 mmol/L    CO2 17 (L) 23 - 29 mmol/L    Glucose 71 70 - 110 mg/dL    BUN 21 (H) 6 - 20 mg/dL    Creatinine 5.7 (H) 0.5 -  1.4 mg/dL    Calcium 9.6 8.7 - 10.5 mg/dL    Total Protein 5.4 (L) 6.0 - 8.4 g/dL    Albumin 2.1 (L) 3.5 - 5.2 g/dL    Total Bilirubin 0.6 0.1 - 1.0 mg/dL    Alkaline Phosphatase 239 (H) 55 - 135 U/L    AST 34 10 - 40 U/L    ALT 19 10 - 44 U/L    eGFR 8 (A) >60 mL/min/1.73 m^2    Anion Gap 13 8 - 16 mmol/L       Microbiology Results (last 7 days)       Procedure Component Value Units Date/Time    Blood Culture #1 **CANNOT BE ORDERED STAT** [[id number]] Collected: 10/10/23 1201    Order Status: Completed Specimen: Blood from Peripheral, Forearm, Right Updated: 10/14/23 1303     Blood Culture, Routine No Growth after 4 days.    Blood Culture #2 **CANNOT BE ORDERED STAT** [[id number]] Collected: 10/10/23 1232    Order Status: Completed Specimen: Blood from Peripheral, Forearm, Right Updated: 10/14/23 1303     Blood Culture, Routine No Growth after 4 days.    Stool culture [[id number]] Collected: 10/11/23 1129    Order Status: Completed Specimen: Stool Updated: 10/14/23 0710     Stool Culture No Salmonella,Shigella,Vibrio,Campylobacter,Yersinia isolated.    E. coli 0157 antigen [[id number]] Collected: 10/11/23 1129    Order Status: Completed Specimen: Stool Updated: 10/12/23 1206     Shiga Toxin 1 E.coli Negative     Shiga Toxin 2 E.coli Negative    Clostridium difficile EIA [[id number]] Collected: 10/11/23 0350    Order Status: Completed Specimen: Stool Updated: 10/11/23 1147     C. diff Antigen Negative     C difficile Toxins A+B, EIA Negative     Comment: Testing not recommended for children <24 months old.                Imaging Results              CT Chest Abdomen Pelvis With Contrast (xpd) (Final result)  Result time 10/10/23 16:47:13      Final result by Yaneth Ray MD (10/10/23 16:47:13)                   Impression:      No finding to correlate with the clinical history.    Two new small pulmonary nodules, nonspecific, can be followed on subsequent exams.    Minimal area of ground-glass opacity in the  right lower lobe with a small parenchymal opacity in the left upper lobe, new compared to prior imaging, nonspecific, may relate to edema, developing infection, aspiration other etiology.    Possible small hypodensity in the pancreas    Additional findings as above      Electronically signed by: Yaneth Ray MD  Date:    10/10/2023  Time:    16:47               Narrative:    EXAMINATION:  CT CHEST ABDOMEN PELVIS WITH CONTRAST (XPD)    CLINICAL HISTORY:  Sepsis;    TECHNIQUE:  Low dose axial images, sagittal and coronal reformations were obtained from the thoracic inlet to the pubic symphysis following the IV administration of 75 mL of Omnipaque 350 and no oral contrast.  Note that the timing of the abdominal portion of the examination is early, basically arterial phase imaging.    COMPARISON:  July 2023    FINDINGS:  Chest:    Evaluation of the lungs is limited due to motion. Some artifact created due to pacing device along the left chest wall.    Cluster of tree-in-bud nodules within the anterior right upper lobe similar to prior imaging.  A few nodular foci within the lungs are stable.  There are new small nodules, series 2, image 67, image 77.    Mild ground-glass opacity at the right lung base new.  Minimal parenchymal opacity in the left upper lobe new.    Incompletely imaged dialysis access at the left axilla.  Adjacent calcification is felt to relate to vascular structure.  There is a central venous catheter entering via the left subclavian, with tip at the right atrium.  There is no significant mediastinal or hilar lymphadenopathy.    The heart is enlarged.  There is no significant pleural effusion.  There is small pericardial effusion.  Calcification present along the wall of the aorta with mild ectasia of the ascending aorta.    Abdomen/pelvis:    Lines external to the patient create some artifact.  Timing of the contrast bolus also decreases sensitivity.  There is reflux of contrast into the inferior  vena cava and hepatic veins likely relating to right heart dysfunction.    Evaluation of the liver parenchyma limited, with no definite abnormality seen.  Hypodensity seen on previous exam is not well visualized today.  The spleen is not enlarged.    The stomach is unremarkable.  There is cholelithiasis.  No evidence of biliary ductal dilatation.    There is no evidence of adrenal mass.  Questionable small hypodensity in the expected region of the pancreatic tail, evaluation of this region somewhat limited.    The right kidney demonstrates small size.  Multiple hypodense structures within the right kidney difficult to characterize well but appear grossly similar to prior imaging.    Left kidney has been removed.  Soft tissue density previously described in the nephrectomy bed is overall similar but difficult to distinguish from adjacent pancreatic tail.    There is advanced calcification along the wall of the aorta and branches.  No aortic aneurysm.  No significant periaortic lymphadenopathy.  There is calcification along the wall of inferior vena cava and branches.    Bladder not well distended or well evaluated.  Uterus is likely present.  The evaluation of the bowel is somewhat limited, with no evidence of bowel distension.                                       X-Ray Chest AP Portable (Final result)  Result time 10/10/23 13:53:44      Final result by Yaneth Ray MD (10/10/23 13:53:44)                   Impression:      As above      Electronically signed by: Yaneth Ray MD  Date:    10/10/2023  Time:    13:53               Narrative:    EXAMINATION:  XR CHEST AP PORTABLE    CLINICAL HISTORY:  End stage renal disease    TECHNIQUE:  Single frontal view of the chest was performed.    COMPARISON:  August 25, 2023    FINDINGS:  The heart size is markedly enlarged but similar to previous study.  There are surgical changes within the soft tissues of the left arm.  There is a central venous catheter entering  the left subclavian with tip in right atrium.  Pacer device present over the left chest.  Vascular calcification present along the wall of the aorta.    There is osseous demineralization.  There is no evidence of significant pleural fluid on this single view.  No evidence of focal consolidation.  Prominence of pulmonary vasculature similar to prior exam.                                       X-Ray Humerus 2 View Left (Final result)  Result time 10/10/23 13:51:09      Final result by Cameron Pierce MD (10/10/23 13:51:09)                   Impression:      1. No convincing acute displaced fracture or dislocation of the left humerus, please see above for additional findings.      Electronically signed by: Cameron Pierce MD  Date:    10/10/2023  Time:    13:51               Narrative:    EXAMINATION:  XR HUMERUS 2 VIEW LEFT    CLINICAL HISTORY:  End stage renal disease    COMPARISON:  None    FINDINGS:  Two views left humerus.    There is osteopenia.  No acute displaced fracture or dislocation of the humerus.  Vascular stent projects over the axilla.  Surgical changes are noted of the soft tissues of the upper arm.  No acute displaced rib fracture.  There is coarse interstitial attenuation involving the visualized left lung zones.  Pacing device noted.  There is marked vascular calcification noting suspected upper extremity dialysis fistula.  There is cachectic change of the body wall.                                       X-Ray Foot Complete Left (Final result)  Result time 10/10/23 13:49:41      Final result by Cameron Pierce MD (10/10/23 13:49:41)                   Impression:      1. No acute displaced fracture or dislocation of the foot.      Electronically signed by: Cameron Pierce MD  Date:    10/10/2023  Time:    13:49               Narrative:    EXAMINATION:  XR FOOT COMPLETE 3 VIEW LEFT    CLINICAL HISTORY:  .  Unspecified open wound of unspecified toe(s) without damage to nail, initial  encounter    TECHNIQUE:  AP, lateral and oblique views of the left foot were performed.    COMPARISON:  07/26/2023    FINDINGS:  Three views left foot.    There is diffuse osteopenia.  There is marked vascular calcification.  There are degenerative changes of the foot.  No convincing acute displaced fracture or dislocation of the foot.  No radiopaque foreign body.  No significant edema.                                             Assessment/Plan:      * Severe sepsis  -Admitted to inpatient status  -On admit hypothermic without tachycardia, leukocytosis but with lactic acidosis and mildly elevated procalcitonin  -CT chest/abd/pelvis- no new source of infection noted  -CT L foot shows no evidence for osteomyelitis  -Organ dysfunction indicated by Encephalopathy  -Blood cultures negative  -C.diff negative.  No fecal WBC. Giardia and crypto Ag negative.  Stool culture unremarkable.  -Note she is s/p AVG excision 9/28/23 for infection.   -Source of sepsis is unclear - had diarrhea and noted skin/soft tissue wound on foot  -Podiatry consulted to evaluate foot wounds- appears stable, no signs of cellulitis - continue wound care  -Unclear if this is actual sepsis or SIRS due to non-infectious etiology  -Consulted ID and input appreciated  -Noted AM cortisol was borderline - did cosyntropin stim test 10/13 with peak response 17.2.  For her diminished body habitus this is a satisfactory response and I doubt adrenal insufficiency.  -No convincing evidence of active infection after extensive workup.  Completed 5 day course of of IV antibiotics per ID recs.  -On 10/15 was again hypothermic and lethargic requiring warming blanket.  Today euthermic and alert but still VERY weak.  -Suspect this is SIRS due to advancing cancer and severe malnutrition  -Patient now DNR and we are pursuing home hospice vs palliative care with ongoing dialysis.  CM consulted to arrange family meeting with 2 different agencies.  If hospice is chosen,  will need to deactivate AICD (not pacer) prior to discharge.    Hypothermia  -This is persistent despite broad spectrum antibiotics  -Unclear if due to sepsis or other cause  -Noted elevated TSH, but it is improved and she is on synthroid  -Doubt adrenal insufficiency and sepsis.  -Treatment as above.    ACP (advance care planning)  Advance Care Planning   Patient remains full code, but is hospice appropriate.  Palliative care consulted and input appreciated.  Continue active GOC discussions.  -Remains full code  -10/15/23 spoke with  at bedside regarding my fear she may be actively dying.  Discussed pros and cons of ACLS treatment and in her case the risk:benefit ratio favors DNR status.  Further, I recommended a transition to focus on comfort care with home based palliative or hospice care.  Her  asked that I speak with his sister, Jose De Jesus, who I met with in the afternoon.  I discussed our fears of her advancing cancer, severe malnutrition, inability to maintain body temperature, severe debility, severe chf, skin breakdown and pre-existing ESRD requiring dialysis.  I shared the same recommendations with her.  She is in favor of DNR status and will discuss with her brother and nephews.  I gave them my cellphone number and asked them to let me know if they have questions or make a decision to change code status or pivot to comfort based care.  I spent 45 minutes ACP 10/15/23.  -CM consulted 10/16 to arrange for family meeting with 2 different agencies.  Unclear if they will choose palliative or hospice care at discharge.  Appreciate assistance of palliative care team.    Severe protein-calorie malnutrition  -Body mass index is 15.14 kg/m².  -Noted unintentional weight loss  -Nutrition consulted  -Did not tolerate higher dose of dronabinol - was too lethargic.    -Agree with cutting back dose.  Ate 25% of breakfast sandwich today    Weight loss  -Treatment as above    Body mass index (BMI) less than  19  -Treatment as above    Chronic combined systolic and diastolic heart failure  -Echo 5/8/23 showed EF 23%, grade II diastolic dysfunction, mild AV stenosis and pulmonary hypertension with PASP 47mmHg  -AICD in place  -Strict ins/outs and daily weights  -Appeared euvolemic to hypovolemic despite BNP > 4000 on admit  -Stopped entresto 10/14 due to low bp  -Continue toprol but with hold parameters to avoid hypotension  -Continue midodrine  -HD for fluid removal as able    Metastatic renal cell carcinoma to lung, unspecified laterality  -History noted  -Follows with Oncology, last seen 9/2023  -On cabozantinib at home   -Repeat CT to look for any progression in setting of worsening weight loss - showed Two new small pulmonary nodules, minimal area of ground-glass opacity in the right lower lobe with a small parenchymal opacity in the left upper lobe and possible small hypodensity in the pancreas  -Hold home Rx for now   -Consulted palliative care and input appreciated.      Hypothyroidism  -TSH 14.4 with normal FT4  -In last 5 months TSH has ranged from 8-22 - most recently 22  -Continue levothyroxine  -Needs TSH rechecked outside of acute illness setting     PAF (paroxysmal atrial fibrillation)  -Patient has Paroxysmal (<7 days) atrial fibrillation which is controlled.   -Patient is currently in sinus rhythm.  -Continue toprol and eliquis    ICD (implantable cardioverter-defibrillator) in place - SubQ  -If hospice chosen at discharge - will need to deactivate defibrillator prior to discharge  -Treatment as above.    Anemia in ESRD (end-stage renal disease)  -On admit Hb 10.7 with MCV > 100  -Hb now 10.8  -No evidence of bleeding  -B12 and folate normal when last checked. TSAT appropriate  -EPO with HD    ESRD (end stage renal disease) on dialysis  -Does HD  TTS via THDC  -Nephrology consulted and input appreciated  -Wound care consult for LUE site    Essential hypertension  -BP low 10/14 requiring fluid bolus,  entresto be stopped and midodrine initiated  -BP solidly normal today.  -Home med list includes toprol, entresto and clonidine patch  -Continue to hold entresto and clonidine for now as it can cause drowsiness   -Continues on midodrine and toprol with hold parameters    Hyperparathyroidism, secondary renal  Lab Results   Component Value Date    PTH >2,500.0 (H) 02/17/2023    CALCIUM 9.4 10/16/2023    PHOS 3.5 10/13/2023   Home lanthanum ordered but has not been taking      VTE Risk Mitigation (From admission, onward)           Ordered     apixaban tablet 2.5 mg  2 times daily         10/10/23 1646     Place PING hose  Until discontinued         10/10/23 1422     Place sequential compression device  Until discontinued         10/10/23 1422     Reason for No Pharmacological VTE Prophylaxis  Once        Question:  Reasons:  Answer:  Already adequately anticoagulated on oral Anticoagulants    10/10/23 1422     IP VTE HIGH RISK PATIENT  Once         10/10/23 1422                    Discharge Planning   MAVIS:      Code Status: DNR   Is the patient medically ready for discharge?:     Reason for patient still in hospital (select all that apply): Treatment and Pending disposition  Discharge Plan A: Home with family, Home Health   Discharge Delays: None known at this time              Danilo Olivier MD  Department of Hospital Medicine   [facility] - Telemetry

## 2023-10-17 NOTE — PLAN OF CARE
Problem: Impaired Wound Healing  Goal: Optimal Wound Healing  Outcome: Ongoing, Progressing     Problem: Skin Injury Risk Increased  Goal: Skin Health and Integrity  Outcome: Ongoing, Progressing     Problem: Device-Related Complication Risk (Hemodialysis)  Goal: Safe, Effective Therapy Delivery  Outcome: Ongoing, Progressing

## 2023-10-17 NOTE — ASSESSMENT & PLAN NOTE
10/17/2023  - interval chart reviewed; discussed pt with CM/SW and potential hospice providers  - plan for pt/family to meet with Shauna from [facility] this afternoon   - following discussion with Hospice Tom, family wished to discussed longer term HD plan with [facility]   - me with pt in dialysis suite today; continued improvement in alertness and participation in discussion; pt continues to express readiness to return home and continued GOC for Hospice with HD   - later attempted to revisit pt in room, follow up with ; he was not at bedside at that time; later called to follow up on plan for hospice today and to share pt's request to discharge home today if possible and things go well with [facility] discussion   - continued plan to meet with [facility] today;  hopeful for agreement in plan of care to get pt home soon  - later revisited to discuss in person recommendation for deactivation of defibrillator prior to discharge; encouraged discussion with hospice representative on including this in plan of care ( I also shared this information with Shauna with [facility])   - emotional support provided; allowed time for questions/concerns, all addressed       10/16/2023   - interval chart reviewed in detail; discussed pt with hospital primary, Dr. Cheney, and CM/SW, Dandre; GOC/ACP change over the weekend shared by Dr. Cheney, with pt's  wishing to proceed with DNR/DNI over the weekend  - me with pt and sister in law initially, and later was joined by pt's    - reviewed current DNR/DNI status with pt participating in discussion   - opened discussion regarding family's expressed potential interest into home palliative care and explored their understanding of hospice services as they expressed to Dr. Cheney not wanting hospice previously   - discussed in detail services provided and philosophy of care of both home palliative and home hospice; prior barrier to hospice identified  as family's prior thought that hospice meant inpatient care; after further discussion of home based hospice and learning that continued HD would also be a potential possibility, pt and family wished to meet with 2 hospice providers to hear plan of care for pt and potential services   - an additional logistic that would need coordination/clarification is transportation to HD for pt; pt's  expressed difficulty and concerns for safety given pt's debility for safely transporting to HD clinic without some sort of coordinated transportation; have shared this concern with both Dr. Cheney and Dandre   - allowed time for questions/concners, emotional support provided   - updated MDT

## 2023-10-17 NOTE — PT/OT/SLP PROGRESS
Occupational Therapy      Patient Name:  Eva Bloom   MRN:  [id number]    Patient not seen today secondary to dialysis . Will follow-up as able.    10/17/2023

## 2023-10-17 NOTE — PROGRESS NOTES
"                        Renal Progress Note    Date of Admission:  10/10/2023 10:59 AM    Length of Stay: 7  Days    Subjective: n/a    Objective:    Current Facility-Administered Medications   Medication    0.9%  NaCl infusion    0.9%  NaCl infusion    acetaminophen tablet 650 mg    apixaban tablet 2.5 mg    dextrose 10% bolus 125 mL 125 mL    dextrose 10% bolus 250 mL 250 mL    droNABinol capsule 2.5 mg    epoetin david injection 4,000 Units    glucagon (human recombinant) injection 1 mg    glucose chewable tablet 16 g    glucose chewable tablet 24 g    HYDROcodone-acetaminophen 5-325 mg per tablet 1 tablet    lactobacillus acidophilus & bulgar 100 million cell packet 1 each    lanthanum chewable tablet 1,000 mg    levothyroxine tablet 75 mcg    loperamide capsule 2 mg    melatonin tablet 6 mg    metoprolol succinate (TOPROL-XL) 24 hr tablet 50 mg    midodrine tablet 5 mg    naloxone 0.4 mg/mL injection 0.02 mg    ondansetron injection 4 mg    prochlorperazine injection Soln 5 mg    sodium chloride 0.9% bolus 250 mL 250 mL    sodium chloride 0.9% flush 10 mL    vitamin renal formula (B-complex-vitamin c-folic acid) 1 mg per capsule 1 capsule     Facility-Administered Medications Ordered in Other Encounters   Medication    0.9%  NaCl infusion       Vitals:    10/16/23 2324 10/17/23 0500 10/17/23 0600 10/17/23 0730   BP: (!) 139/99 135/83 135/83 (!) 138/91   BP Location: Right arm  Right arm    Patient Position: Lying  Lying    Pulse: 70 77 77 85   Resp: 18 18 16 18   Temp: 97.7 °F (36.5 °C) 97.6 °F (36.4 °C) 97.6 °F (36.4 °C) 97.8 °F (36.6 °C)   TempSrc: Axillary Axillary Axillary    SpO2: 100% 99% 99% 99%   Weight:       Height:           I/O last 3 completed shifts:  In: 160 [P.O.:160]  Out: -   I/O this shift:  In: 120 [P.O.:120]  Out: -         Laboratories:    No results for input(s): "WBC", "RBC", "HGB", "HCT", "PLT", "MCV", "MCH", "MCHC" in the last 24 hours.    Recent Labs   Lab 10/17/23  0505   CALCIUM 9.6 " "  ALBUMIN 2.1*   PROT 5.4*      K 3.6   CO2 17*   *   BUN 21*   CREATININE 5.7*   ALKPHOS 239*   ALT 19   AST 34   BILITOT 0.6       No results for input(s): "COLORU", "CLARITYU", "SPECGRAV", "PHUR", "PROTEINUA", "GLUCOSEU", "BILIRUBINCON", "BLOODU", "WBCU", "RBCU", "BACTERIA", "MUCUS" in the last 24 hours.    Microbiology Results (last 7 days)       Procedure Component Value Units Date/Time    Blood Culture #1 **CANNOT BE ORDERED STAT** [[id number]] Collected: 10/10/23 1201    Order Status: Completed Specimen: Blood from Peripheral, Forearm, Right Updated: 10/14/23 1303     Blood Culture, Routine No Growth after 4 days.    Blood Culture #2 **CANNOT BE ORDERED STAT** [[id number]] Collected: 10/10/23 1232    Order Status: Completed Specimen: Blood from Peripheral, Forearm, Right Updated: 10/14/23 1303     Blood Culture, Routine No Growth after 4 days.    Stool culture [[id number]] Collected: 10/11/23 1129    Order Status: Completed Specimen: Stool Updated: 10/14/23 0710     Stool Culture No Salmonella,Shigella,Vibrio,Campylobacter,Yersinia isolated.    E. coli 0157 antigen [[id number]] Collected: 10/11/23 1129    Order Status: Completed Specimen: Stool Updated: 10/12/23 1206     Shiga Toxin 1 E.coli Negative     Shiga Toxin 2 E.coli Negative    Clostridium difficile EIA [[id number]] Collected: 10/11/23 0350    Order Status: Completed Specimen: Stool Updated: 10/11/23 1147     C. diff Antigen Negative     C difficile Toxins A+B, EIA Negative     Comment: Testing not recommended for children <24 months old.                 Diagnostic Tests: n/a        Assessment:       57 y/o female with ESRD on HD admitted with:     - Metastatic RCC  - Cachexia/failure to thrive  - Hypotension  - Chronic combined HF  - Infected AVG s/p excision (Has THDC)  - Hx. PAD LLE with gangrene  - 2nd HPT  - PAF  - Hx. AICD  - Hx. Pulmonary HTN  - Anemia of ckd  - HTN  - Hypothyroidism        Plan:     - Discussion regarding Hospice " in progress  - Dialysis Q TTS for now no net UF  - Epogen 3 x week  - Renal diet + protein supplements  - Other problems per admitting       Will follow on Dialysis days.

## 2023-10-17 NOTE — CONSULTS
1953:    Consult was conducted with the patient's medical provider and the patient's spouse.  Medical provider reported that the patient's spouse is currently visiting with his wife. In addition, she stated that the patient's sister in law had visited earlier today. The patient was asleep.   Patient's  stated that before he make a final decision regarding hospice, he posed dome questions that he needed and desired to be answered. He also disclosed that one question is regarding the patient's ability to receive dialysis if hospice is performed at home.  Prayers were requested  and offered for the patient and her family.  The Spiritual Care Team will continue to provide spiritual support to the patient and her family.        JACOB Story   [phone]

## 2023-10-17 NOTE — NURSING
Patient arrived to floor from dialysis. No fluid was removed. Patient tolerated well. Safety precautions maintained. Plan of care ongoing.

## 2023-10-17 NOTE — PT/OT/SLP PROGRESS
Physical Therapy      Patient Name:  Eva Bloom   MRN:  [id number]    Patient not seen today secondary to Dialysis. Will follow-up as able.

## 2023-10-17 NOTE — NURSING
[facility], [facility]  Nurses Note -- 4 Eyes            Skin assessed on: Q Shift      [] No Pressure Injuries Present    []Prevention Measures Documented    [x] Yes LDA  for Pressure Injury Previously documented     [] Yes New Pressure Injury Discovered   [] LDA for New Pressure Injury Added      Attending RN:  Tawanda Fink RN     Second RN:  Zamzam REY RN

## 2023-10-17 NOTE — NURSING
[facility], [facility] [place]  Nurses Note -- 4 Eyes      10/17/2023       Skin assessed on: Q Shift      [] No Pressure Injuries Present    []Prevention Measures Documented    [x] Yes LDA  for Pressure Injury Previously documented     [] Yes New Pressure Injury Discovered   [] LDA for New Pressure Injury Added      Attending :  Zamzam Blankenship LPN     Second RN:  Tawanda Fink RN

## 2023-10-17 NOTE — SUBJECTIVE & OBJECTIVE
Medications:  Continuous Infusions:  Scheduled Meds:   sodium chloride 0.9%   Intravenous Once    apixaban  2.5 mg Oral BID    droNABinol  2.5 mg Oral BID    epoetin david (PROCRIT) injection  4,000 Units Intravenous Every Tues, Thurs, Sat    lactobacillus acidophilus & bulgar  1 packet Oral BID    lanthanum  1,000 mg Oral TID WM    levothyroxine  75 mcg Oral Before breakfast    metoprolol succinate  50 mg Oral Daily    midodrine  5 mg Oral BID WM    sodium chloride 0.9%  10 mL Intravenous Q8H    vitamin renal formula (B-complex-vitamin c-folic acid)  1 capsule Oral Daily     PRN Meds:sodium chloride 0.9%, acetaminophen, dextrose 10%, dextrose 10%, glucagon (human recombinant), glucose, glucose, HYDROcodone-acetaminophen, loperamide, melatonin, naloxone, ondansetron, prochlorperazine, sodium chloride 0.9%    Objective:     Vital Signs (Most Recent):  Temp: 97.7 °F (36.5 °C) (10/17/23 1340)  Pulse: 79 (10/17/23 1340)  Resp: 19 (10/17/23 1340)  BP: 106/62 (10/17/23 1340)  SpO2: 97 % (10/17/23 1340) Vital Signs (24h Range):  Temp:  [96 °F (35.6 °C)-98 °F (36.7 °C)] 97.7 °F (36.5 °C)  Pulse:  [70-88] 79  Resp:  [16-20] 19  SpO2:  [97 %-100 %] 97 %  BP: ()/(56-99) 106/62     Weight: 40 kg (88 lb 2.9 oz)  Body mass index is 15.14 kg/m².       Physical Exam  Vitals and nursing note reviewed.   Constitutional:       General: She is awake.      Appearance: She is cachectic.      Comments: Frail, appears older than age   HENT:      Head: Atraumatic.      Comments: Bilateral temporal wasting   Pulmonary:      Effort: No respiratory distress.   Musculoskeletal:      Comments: Muscular atrophy to extremities    Neurological:      Mental Status: She is oriented to person, place, and time. She is lethargic.      Comments: Oriented; answered questions appropriately; improved mental status and alertness compared to admit; more participatory in discussions   Psychiatric:         Mood and Affect: Affect is flat.         Behavior:  Behavior is cooperative.     Advance Care Planning   Advance Directives:   Goals of Care: What is most important right now is to focus on spending time at home, avoiding the hospital, remaining as independent as possible, quality of life, even if it means sacrificing a little time, improvement in condition but with limits to invasive therapies, comfort and QOL . Accordingly, we have decided that the best plan to meet the patient's goals includes continuing with treatment and discussing option of palliative vs hospice at home.     Significant Labs: All pertinent labs within the past 24 hours have been reviewed.    CBC:   Recent Labs   Lab 10/16/23  0529   WBC 5.52   HGB 10.8*   HCT 35.9*   *          BMP:  Recent Labs   Lab 10/17/23  0505   GLU 71      K 3.6   *   CO2 17*   BUN 21*   CREATININE 5.7*   CALCIUM 9.6       LFT:  Lab Results   Component Value Date    AST 34 10/17/2023     (H) 10/12/2006    ALKPHOS 239 (H) 10/17/2023    BILITOT 0.6 10/17/2023     Albumin:   Albumin   Date Value Ref Range Status   10/17/2023 2.1 (L) 3.5 - 5.2 g/dL Final     Protein:   Total Protein   Date Value Ref Range Status   10/17/2023 5.4 (L) 6.0 - 8.4 g/dL Final     Lactic acid:   Lab Results   Component Value Date    LACTATE 1.6 10/16/2023    LACTATE 2.1 10/15/2023     Significant Imaging: I have reviewed all pertinent imaging results/findings within the past 24 hours.

## 2023-10-18 VITALS
WEIGHT: 88.19 LBS | HEART RATE: 82 BPM | SYSTOLIC BLOOD PRESSURE: 104 MMHG | BODY MASS INDEX: 15.06 KG/M2 | OXYGEN SATURATION: 100 % | HEIGHT: 64 IN | DIASTOLIC BLOOD PRESSURE: 61 MMHG | RESPIRATION RATE: 17 BRPM | TEMPERATURE: 98 F

## 2023-10-18 LAB
ALBUMIN SERPL BCP-MCNC: 2.1 G/DL (ref 3.5–5.2)
ALP SERPL-CCNC: 244 U/L (ref 55–135)
ALT SERPL W/O P-5'-P-CCNC: 22 U/L (ref 10–44)
ANION GAP SERPL CALC-SCNC: 11 MMOL/L (ref 8–16)
AST SERPL-CCNC: 40 U/L (ref 10–40)
BILIRUB SERPL-MCNC: 0.6 MG/DL (ref 0.1–1)
BUN SERPL-MCNC: 10 MG/DL (ref 6–20)
CALCIUM SERPL-MCNC: 9.5 MG/DL (ref 8.7–10.5)
CHLORIDE SERPL-SCNC: 109 MMOL/L (ref 95–110)
CO2 SERPL-SCNC: 20 MMOL/L (ref 23–29)
CREAT SERPL-MCNC: 3.5 MG/DL (ref 0.5–1.4)
EST. GFR  (NO RACE VARIABLE): 14 ML/MIN/1.73 M^2
GLUCOSE SERPL-MCNC: 74 MG/DL (ref 70–110)
POTASSIUM SERPL-SCNC: 3.5 MMOL/L (ref 3.5–5.1)
PROT SERPL-MCNC: 5.4 G/DL (ref 6–8.4)
SODIUM SERPL-SCNC: 140 MMOL/L (ref 136–145)

## 2023-10-18 PROCEDURE — 99233 SBSQ HOSP IP/OBS HIGH 50: CPT | Mod: GW,HPC,, | Performed by: REGISTERED NURSE

## 2023-10-18 PROCEDURE — 99497 PR ADVNCD CARE PLAN 30 MIN: ICD-10-PCS | Mod: GW,HPC,, | Performed by: REGISTERED NURSE

## 2023-10-18 PROCEDURE — 99233 PR SUBSEQUENT HOSPITAL CARE,LEVL III: ICD-10-PCS | Mod: GW,HPC,, | Performed by: REGISTERED NURSE

## 2023-10-18 PROCEDURE — 36415 COLL VENOUS BLD VENIPUNCTURE: CPT | Performed by: HOSPITALIST

## 2023-10-18 PROCEDURE — 25000003 PHARM REV CODE 250: Performed by: INTERNAL MEDICINE

## 2023-10-18 PROCEDURE — 80053 COMPREHEN METABOLIC PANEL: CPT | Performed by: HOSPITALIST

## 2023-10-18 PROCEDURE — 63600175 PHARM REV CODE 636 W HCPCS: Performed by: INTERNAL MEDICINE

## 2023-10-18 PROCEDURE — A4216 STERILE WATER/SALINE, 10 ML: HCPCS | Performed by: HOSPITALIST

## 2023-10-18 PROCEDURE — 25000003 PHARM REV CODE 250: Performed by: HOSPITALIST

## 2023-10-18 PROCEDURE — 80100016 HC MAINTENANCE HEMODIALYSIS

## 2023-10-18 PROCEDURE — 99497 ADVNCD CARE PLAN 30 MIN: CPT | Mod: GW,HPC,, | Performed by: REGISTERED NURSE

## 2023-10-18 RX ADMIN — NEPHROCAP 1 CAPSULE: 1 CAP ORAL at 09:10

## 2023-10-18 RX ADMIN — MIDODRINE HYDROCHLORIDE 5 MG: 5 TABLET ORAL at 09:10

## 2023-10-18 RX ADMIN — LANTHANUM CARBONATE 1000 MG: 500 TABLET, CHEWABLE ORAL at 09:10

## 2023-10-18 RX ADMIN — Medication 10 ML: at 02:10

## 2023-10-18 RX ADMIN — APIXABAN 2.5 MG: 2.5 TABLET, FILM COATED ORAL at 09:10

## 2023-10-18 RX ADMIN — LEVOTHYROXINE SODIUM 75 MCG: 75 TABLET ORAL at 06:10

## 2023-10-18 RX ADMIN — DRONABINOL 2.5 MG: 2.5 CAPSULE ORAL at 09:10

## 2023-10-18 RX ADMIN — LACTOBACILLUS ACIDOPHILUS / LACTOBACILLUS BULGARICUS 1 EACH: 100 MILLION CFU STRENGTH GRANULES at 09:10

## 2023-10-18 RX ADMIN — METOPROLOL SUCCINATE 50 MG: 50 TABLET, EXTENDED RELEASE ORAL at 09:10

## 2023-10-18 RX ADMIN — HYDROCODONE BITARTRATE AND ACETAMINOPHEN 1 TABLET: 5; 325 TABLET ORAL at 06:10

## 2023-10-18 NOTE — PLAN OF CARE
[facility]  Department of Hospital Medicine  [address] [place] [address]  [phone] after hours  [phone] fax    HOSPICE  ORDERS    10/18/2023    Admit to Hospice:  Home Service    Diagnoses:   Active Hospital Problems    Diagnosis  POA    *Severe sepsis [A41.9, R65.20]  Yes    Hypothermia [T68.XXXA]  Yes    Severe protein-calorie malnutrition [E43]  Yes    ACP (advance care planning) [Z71.89]  Not Applicable    Weight loss [R63.4]  Yes    Body mass index (BMI) less than 19 [Z68.1]  Not Applicable    Chronic combined systolic and diastolic heart failure [I50.42]  Yes    Metastatic renal cell carcinoma to lung, unspecified laterality [C78.00, C64.9]  Yes    Hypothyroidism [E03.9]  Yes     Chronic    PAF (paroxysmal atrial fibrillation) [I48.0]  Yes     Chronic    ICD (implantable cardioverter-defibrillator) in place - SubQ [Z95.810]  Yes    Anemia in ESRD (end-stage renal disease) [N18.6, D63.1]  Yes     Chronic    ESRD (end stage renal disease) on dialysis [N18.6, Z99.2]  Not Applicable     Chronic     - Tuesday, Thursday, and Saturday  - Chelsie Hart, nephrology      Essential hypertension [I10]  Yes     Chronic    Hyperparathyroidism, secondary renal [N25.81]  Yes      Resolved Hospital Problems   No resolved problems to display.       Hospice Qualifying Diagnoses:        Patient has a life expectancy < 6 months due to:  Primary Hospice Diagnosis:  Metastatic RCC  Comorbid Conditions Contributing to Decline: ESRD, recent L AVG excision, RCC s/p nephrectomy, pAFIB, hypothyroidism, systolic/diastolic chf, PAD with toe wounds, Anemia, Anticoagulant long-term use, Bronchitis, Cancer,  NYHA class II, chronic, systolic, CMV (cytomegalovirus) antibody positive, Essential hypertension, H/O herpes simplex type 2 infection, Herpes simplex type 1 antibody positive, Hyperparathyroidism, Hyperuricemia without signs of inflammatory arthritis and tophaceous  disease, Kidney stones, LGSIL (low grade squamous intraepithelial dysplasia), Myocardiopathy, Prediabetes, Proteinuria, Renal disorder, Thyroid nodule, and Urate nephropathy      Vital Signs: Routine per Hospice Protocol.    Code Status: DNR    Allergies:   Review of patient's allergies indicates:   Allergen Reactions    Coreg [carvedilol] Other (See Comments)     Nausea/vomiting    Allopurinol      Other reaction(s): abnormal transaminases       Diet: renal diet    Activities: As tolerated    Goals of Care Treatment Preferences:  Code Status: DNR          What is most important right now is to focus on spending time at home, avoiding the hospital, remaining as independent as possible, quality of life, even if it means sacrificing a little time, improvement in condition but with limits to invasive therapies, comfort and QOL .  Accordingly, we have decided that the best plan to meet the patient's goals includes continuing with treatment, enrolling in hospice care.      Nursing: Per Hospice Routine.      Routine Skin for Bedridden Patients    Wound Care:      Left forearm wound- 1 cm opening with white slough in base of wound. Scant serous drainage. No surrounding erythema/induration.          Perirectal skin- Severely denuded. Having frequent incontinent thick stool.       Treatment:  Local wound care to left upper arm wound every shift with Vashe moistened gauze.  Local wound care to perirectal skin with Remedy No Rinse Cleanser and Remedy Zinc product after each incontinent stool    Oxygen: no      Medications:        Medication List        CONTINUE taking these medications      acetaminophen 500 MG tablet  Commonly known as: TYLENOL  Take 500 mg by mouth every 6 (six) hours as needed for Pain.     apixaban 2.5 mg Tab  Commonly known as: ELIQUIS  Take 1 tablet (2.5 mg total) by mouth 2 (two) times daily.     aspirin 81 MG Chew  Take 1 tablet (81 mg total) by mouth once daily.     CABOMETYX 60 mg Tab  Generic drug:  cabozantinib  TAKE ONE TABLET BY MOUTH ONCE DAILY AT THE SAME TIME ON AN EMPTY STOMACH AT LEAST 1 HOUR BEFORE OR 2 HOURS AFTER EATING. AVOID GRAPEFRUIT PRODUCTS     cloNIDine 0.3 mg/24 hr td ptwk 0.3 mg/24 hr  Commonly known as: CATAPRES  Place 1 patch onto the skin every 7 days.     cyclobenzaprine 5 MG tablet  Commonly known as: FLEXERIL  TAKE 1 TABLET BY MOUTH DAILY AS NEEDED FOR MUSCLE SPASMS.     ENTRESTO 49-51 mg per tablet  Generic drug: sacubitriL-valsartan  TAKE 1 TABLET BY MOUTH TWICE A DAY     fluticasone propionate 50 mcg/actuation nasal spray  Commonly known as: FLONASE  1 spray (50 mcg total) by Each Nostril route 2 (two) times daily as needed (ear fullness).     FosrenoL 1000 MG chewable tablet  Generic drug: lanthanum  Take 1 tablet by mouth.     hydrALAZINE 100 MG tablet  Commonly known as: APRESOLINE  Take 1 tablet (100 mg total) by mouth every 12 (twelve) hours.     HYDROcodone-acetaminophen 5-325 mg per tablet  Commonly known as: NORCO  Take 1 tablet by mouth every 6 (six) hours as needed for Pain.     levothyroxine 75 MCG tablet  Commonly known as: SYNTHROID  Take 1 tablet (75 mcg total) by mouth once daily.     LIDOcaine-prilocaine cream  Commonly known as: EMLA  APPLY ATLEAST 30 MINUTES BEFORE TREATMENT 3 TIMES A WEEK     metoprolol succinate 100 MG 24 hr tablet  Commonly known as: TOPROL-XL  Take 1/2 tablet (50mg) once daily     ONE-A-DAY WOMEN'S 50 PLUS 400-20 mcg Tab  Generic drug: mv,Ca,min-folic acid-vit K1  Take 1 tablet by mouth once daily.     RETACRIT INJ  Epoetin david - epbx (Retacrit)            ASK your doctor about these medications      naloxone 1 mg/mL injection  Commonly known as: NARCAN  2 mg (1 mg per nostril) by Nasal route as needed for opioid overdose; may repeat in 3 to 5 minutes if not effective. Call 911                DIABETES CARE: no    Future Orders:  Hospice Medical Director may dictate new orders for comfortable care measures & sign death  certificate.        _________________________________  Danilo Olivier MD  10/18/2023

## 2023-10-18 NOTE — DISCHARGE SUMMARY
[facility]  Discharge Summary      Patient Name: Eva Bloom  MRN: [id number]  [facility]: [id number]  Patient Class: IP- Inpatient  Admission Date: 10/10/2023  Hospital Length of Stay: 8 days  Discharge Date and Time:  10/18/2023 9:26 AM  Attending Physician: Danilo Olivier MD   Discharging Provider: Danilo Olivier MD  Primary Care Provider: Sowmya Mccain MD    Primary Care Team: Networked reference to record PCT     HPI:   Ms Eva Bloom is a 58 y.o. woman with ESRD on HD TTS via THDC, systolic/diastolic CHF, PAD with toe wounds who presents for weight loss.  reports weight loss over last few weeks. Her appetite is poor but stable. No nausea, vomiting, dysphagia, abdominal pain. Reports diarrhea liquid stools 3-4x/day over last week. Does not make urine. She is s/p recent L AVG excision, site is healing well with no drainage or erythema. S/p L THDC placement, also working well with no issues. She does have L 3rd toe ulceration present, following with Podiatry. Was prescribed doxycycline after last appointment but never started it. Does have RCC following with Oncology on immunotherapy.     In ED, noted hypothermic and drowsy. Lactic elevated. Started warming blanket and broad antibiotics. Admitted to ICU.       * No surgery found *      Hospital Course:   58 year old woman with ESRD (HD TTS), recent L AVG excision, RCC s/p nephrectomy, pAFIB, hypothyroidism, systolic/diastolic chf, PAD with toe wounds who presented for evaluation of unintentional weight loss.  She was admitted to inpatient status and diagnosed with SIRS, metastatic RCC to lung and pancreas, severe malnutrition.  Initially treated in Icu with broad spectrum antibiotics but no definitive souce of infection identified.  ID consulted and completed IV antibiotics per ID.  Again with hypothermia and severe lethargy 10/15 which resolved with warming blanket.  Adrenal response is normal.  This appears to be SIRS due  to advancing cancer and severe malnutrition.  She is now DNR and  is pursuing hospice vs home palliative care.  Setting up meeting for him with 2 different agencies.  He does wish to continue with dialysis.  If chooses hospice, will need to deactivate AICD prior to discharge.       Goals of Care Treatment Preferences:  Code Status: DNR          What is most important right now is to focus on spending time at home, avoiding the hospital, remaining as independent as possible, quality of life, even if it means sacrificing a little time, improvement in condition but with limits to invasive therapies, comfort and QOL .  Accordingly, we have decided that the best plan to meet the patient's goals includes continuing with treatment, enrolling in hospice care.    ROS:  General: Negative for fevers or chills.  Cardiac: Negative for chest pain or orthopnea        Vitals:    10/17/23 2152 10/17/23 2308 10/18/23 0559 10/18/23 0609   BP:  119/71 (!) 141/86    BP Location:  Right arm Right arm    Patient Position:  Lying Lying    Pulse:  88 93    Resp: 18 18 18 18   Temp:  96 °F (35.6 °C) 97.5 °F (36.4 °C)    TempSrc:  Oral Oral    SpO2:  99% 100%    Weight:       Height:              Body mass index is 15.14 kg/m².      PHYSICAL EXAM:  GENERAL APPEARANCE: alert and cooperative, and appears to be in no acute distress.  Frail  HEENT:     HEAD: NC/AT     EYES: PERRL, EOMI.  Vision is grossly intact.  NECK: Neck supple, non-tender without LAD, masses or thyromegaly.  CARDIAC: There is no peripheral edema, cyanosis or pallor.   LUNGS: Clear to auscultation and percussion without rales or wheezing  ABDOMEN: Non-distended. No guarding.  MSK: No joint erythema or tenderness.   EXTREMITIES: No significant deformity or joint abnormality. No edema.   NEUROLOGICAL: CN II-XII grossly intact.   SKIN: Skin normal color, texture and turgor with no lesions or eruptions.  +wounds  PSYCHIATRIC: Oriented. No tangential speech. No Hyperactive  features.    Consults:   Consults (From admission, onward)        Status Ordering Provider     Inpatient consult to Spiritual Care  Once        Provider:  (Not yet assigned)    Completed GALO BECERRA     Inpatient consult to Social Work  Once        Provider:  (Not yet assigned)    Completed DARRICK BAY     Inpatient consult to Infectious Diseases  Once        Provider:  (Not yet assigned)    Completed DARRICK BAY     IP consult to case management  Once        Provider:  (Not yet assigned)    Completed MECHELLE FIGUEROA     Inpatient consult to Registered Dietitian/Nutritionist  Once        Provider:  (Not yet assigned)    Completed MECHELLE FIGUEROA     Inpatient consult to Palliative Care  Once        Provider:  (Not yet assigned)    Completed MANUEL YUN     Inpatient consult to Podiatry  Once        Provider:  (Not yet assigned)    Completed MANUEL YUN     Inpatient consult to Nephrology  Once        Provider:  Albina Bermudez MD    Acknowledged MECHELLE FIGUEROA          No new Assessment & Plan notes have been filed under this hospital service since the last note was generated.  Service: Hospital Medicine    Final Active Diagnoses:    Diagnosis Date Noted POA    PRINCIPAL PROBLEM:  Severe sepsis [A41.9, R65.20] 10/29/2022 Yes    Hypothermia [T68.XXXA] 10/12/2023 Yes    Severe protein-calorie malnutrition [E43] 10/11/2023 Yes    ACP (advance care planning) [Z71.89] 10/11/2023 Not Applicable    Weight loss [R63.4] 10/10/2023 Yes    Body mass index (BMI) less than 19 [Z68.1] 09/28/2023 Not Applicable    Chronic combined systolic and diastolic heart failure [I50.42] 08/25/2023 Yes    Metastatic renal cell carcinoma to lung, unspecified laterality [C78.00, C64.9] 08/11/2023 Yes    Hypothyroidism [E03.9] 07/19/2023 Yes     Chronic    PAF (paroxysmal atrial fibrillation) [I48.0] 07/25/2022 Yes     Chronic    ICD (implantable cardioverter-defibrillator) in place - SubQ [Z95.810]  07/15/2021 Yes    Anemia in ESRD (end-stage renal disease) [N18.6, D63.1]  Yes     Chronic    ESRD (end stage renal disease) on dialysis [N18.6, Z99.2]  Not Applicable     Chronic    Essential hypertension [I10] 09/23/2015 Yes     Chronic    Hyperparathyroidism, secondary renal [N25.81]  Yes      Problems Resolved During this Admission:       Discharged Condition: stable    Disposition: home w hospice    Follow Up:    Patient Instructions:   No discharge procedures on file.      Recent Results (from the past 100 hour(s))   Comprehensive Metabolic Panel    Collection Time: 10/14/23 11:18 AM   Result Value Ref Range    Sodium 139 136 - 145 mmol/L    Potassium 2.9 (L) 3.5 - 5.1 mmol/L    Chloride 109 95 - 110 mmol/L    CO2 19 (L) 23 - 29 mmol/L    Glucose 94 70 - 110 mg/dL    BUN 22 (H) 6 - 20 mg/dL    Creatinine 3.9 (H) 0.5 - 1.4 mg/dL    Calcium 8.0 (L) 8.7 - 10.5 mg/dL    Total Protein 4.5 (L) 6.0 - 8.4 g/dL    Albumin 1.8 (L) 3.5 - 5.2 g/dL    Total Bilirubin 0.5 0.1 - 1.0 mg/dL    Alkaline Phosphatase 256 (H) 55 - 135 U/L    AST 49 (H) 10 - 40 U/L    ALT 24 10 - 44 U/L    eGFR 13 (A) >60 mL/min/1.73 m^2    Anion Gap 11 8 - 16 mmol/L   Lactic acid, plasma    Collection Time: 10/14/23  2:46 PM   Result Value Ref Range    Lactate (Lactic Acid) 2.3 (H) 0.5 - 2.2 mmol/L   CBC Auto Differential    Collection Time: 10/14/23  6:08 PM   Result Value Ref Range    WBC 6.48 3.90 - 12.70 K/uL    RBC 3.00 (L) 4.00 - 5.40 M/uL    Hemoglobin 10.2 (L) 12.0 - 16.0 g/dL    Hematocrit 32.5 (L) 37.0 - 48.5 %     (H) 82 - 98 fL    MCH 34.0 (H) 27.0 - 31.0 pg    MCHC 31.4 (L) 32.0 - 36.0 g/dL    RDW 22.4 (H) 11.5 - 14.5 %    Platelets 136 (L) 150 - 450 K/uL    MPV 12.4 9.2 - 12.9 fL    Immature Granulocytes 0.6 (H) 0.0 - 0.5 %    Gran # (ANC) 4.9 1.8 - 7.7 K/uL    Immature Grans (Abs) 0.04 0.00 - 0.04 K/uL    Lymph # 1.0 1.0 - 4.8 K/uL    Mono # 0.3 0.3 - 1.0 K/uL    Eos # 0.3 0.0 - 0.5 K/uL    Baso # 0.04 0.00 - 0.20 K/uL     nRBC 2 (A) 0 /100 WBC    Gran % 75.0 (H) 38.0 - 73.0 %    Lymph % 15.0 (L) 18.0 - 48.0 %    Mono % 4.2 4.0 - 15.0 %    Eosinophil % 4.6 0.0 - 8.0 %    Basophil % 0.6 0.0 - 1.9 %    Aniso Slight     Poly Occasional     Ovalocytes Occasional     Large/Giant Platelets Present     Differential Method Automated    CBC Auto Differential    Collection Time: 10/15/23  5:01 AM   Result Value Ref Range    WBC 5.06 3.90 - 12.70 K/uL    RBC 3.48 (L) 4.00 - 5.40 M/uL    Hemoglobin 11.6 (L) 12.0 - 16.0 g/dL    Hematocrit 38.3 37.0 - 48.5 %     (H) 82 - 98 fL    MCH 33.3 (H) 27.0 - 31.0 pg    MCHC 30.3 (L) 32.0 - 36.0 g/dL    RDW 23.7 (H) 11.5 - 14.5 %    Platelets 167 150 - 450 K/uL    MPV 12.4 9.2 - 12.9 fL    Immature Granulocytes 0.6 (H) 0.0 - 0.5 %    Gran # (ANC) 3.3 1.8 - 7.7 K/uL    Immature Grans (Abs) 0.03 0.00 - 0.04 K/uL    Lymph # 1.2 1.0 - 4.8 K/uL    Mono # 0.3 0.3 - 1.0 K/uL    Eos # 0.2 0.0 - 0.5 K/uL    Baso # 0.05 0.00 - 0.20 K/uL    nRBC 3 (A) 0 /100 WBC    Gran % 65.5 38.0 - 73.0 %    Lymph % 22.9 18.0 - 48.0 %    Mono % 5.7 4.0 - 15.0 %    Eosinophil % 4.3 0.0 - 8.0 %    Basophil % 1.0 0.0 - 1.9 %    Differential Method Automated    Comprehensive Metabolic Panel    Collection Time: 10/15/23  8:35 AM   Result Value Ref Range    Sodium 140 136 - 145 mmol/L    Potassium 3.9 3.5 - 5.1 mmol/L    Chloride 112 (H) 95 - 110 mmol/L    CO2 14 (L) 23 - 29 mmol/L    Glucose 74 70 - 110 mg/dL    BUN 12 6 - 20 mg/dL    Creatinine 3.2 (H) 0.5 - 1.4 mg/dL    Calcium 9.6 8.7 - 10.5 mg/dL    Total Protein 6.0 6.0 - 8.4 g/dL    Albumin 2.3 (L) 3.5 - 5.2 g/dL    Total Bilirubin 0.6 0.1 - 1.0 mg/dL    Alkaline Phosphatase 325 (H) 55 - 135 U/L    AST 55 (H) 10 - 40 U/L    ALT 31 10 - 44 U/L    eGFR 16 (A) >60 mL/min/1.73 m^2    Anion Gap 14 8 - 16 mmol/L   Lactic Acid, Plasma    Collection Time: 10/15/23  7:37 PM   Result Value Ref Range    Lactate (Lactic Acid) 2.1 0.5 - 2.2 mmol/L   CBC Auto Differential    Collection  Time: 10/16/23  5:29 AM   Result Value Ref Range    WBC 5.52 3.90 - 12.70 K/uL    RBC 3.16 (L) 4.00 - 5.40 M/uL    Hemoglobin 10.8 (L) 12.0 - 16.0 g/dL    Hematocrit 35.9 (L) 37.0 - 48.5 %     (H) 82 - 98 fL    MCH 34.2 (H) 27.0 - 31.0 pg    MCHC 30.1 (L) 32.0 - 36.0 g/dL    RDW 23.0 (H) 11.5 - 14.5 %    Platelets 193 150 - 450 K/uL    MPV 12.4 9.2 - 12.9 fL    Immature Granulocytes 0.4 0.0 - 0.5 %    Gran # (ANC) 3.4 1.8 - 7.7 K/uL    Immature Grans (Abs) 0.02 0.00 - 0.04 K/uL    Lymph # 1.3 1.0 - 4.8 K/uL    Mono # 0.5 0.3 - 1.0 K/uL    Eos # 0.3 0.0 - 0.5 K/uL    Baso # 0.09 0.00 - 0.20 K/uL    nRBC 3 (A) 0 /100 WBC    Gran % 60.8 38.0 - 73.0 %    Lymph % 23.2 18.0 - 48.0 %    Mono % 8.2 4.0 - 15.0 %    Eosinophil % 5.8 0.0 - 8.0 %    Basophil % 1.6 0.0 - 1.9 %    Differential Method Automated    Comprehensive Metabolic Panel    Collection Time: 10/16/23  5:29 AM   Result Value Ref Range    Sodium 140 136 - 145 mmol/L    Potassium 4.6 3.5 - 5.1 mmol/L    Chloride 112 (H) 95 - 110 mmol/L    CO2 16 (L) 23 - 29 mmol/L    Glucose 70 70 - 110 mg/dL    BUN 16 6 - 20 mg/dL    Creatinine 4.3 (H) 0.5 - 1.4 mg/dL    Calcium 9.4 8.7 - 10.5 mg/dL    Total Protein 5.8 (L) 6.0 - 8.4 g/dL    Albumin 2.2 (L) 3.5 - 5.2 g/dL    Total Bilirubin 0.5 0.1 - 1.0 mg/dL    Alkaline Phosphatase 277 (H) 55 - 135 U/L    AST 74 (H) 10 - 40 U/L    ALT 27 10 - 44 U/L    eGFR 11 (A) >60 mL/min/1.73 m^2    Anion Gap 12 8 - 16 mmol/L   Lactic Acid, Plasma    Collection Time: 10/16/23  5:29 AM   Result Value Ref Range    Lactate (Lactic Acid) 1.6 0.5 - 2.2 mmol/L   Comprehensive Metabolic Panel    Collection Time: 10/17/23  5:05 AM   Result Value Ref Range    Sodium 144 136 - 145 mmol/L    Potassium 3.6 3.5 - 5.1 mmol/L    Chloride 114 (H) 95 - 110 mmol/L    CO2 17 (L) 23 - 29 mmol/L    Glucose 71 70 - 110 mg/dL    BUN 21 (H) 6 - 20 mg/dL    Creatinine 5.7 (H) 0.5 - 1.4 mg/dL    Calcium 9.6 8.7 - 10.5 mg/dL    Total Protein 5.4 (L) 6.0 -  8.4 g/dL    Albumin 2.1 (L) 3.5 - 5.2 g/dL    Total Bilirubin 0.6 0.1 - 1.0 mg/dL    Alkaline Phosphatase 239 (H) 55 - 135 U/L    AST 34 10 - 40 U/L    ALT 19 10 - 44 U/L    eGFR 8 (A) >60 mL/min/1.73 m^2    Anion Gap 13 8 - 16 mmol/L   Comprehensive Metabolic Panel    Collection Time: 10/18/23  5:23 AM   Result Value Ref Range    Sodium 140 136 - 145 mmol/L    Potassium 3.5 3.5 - 5.1 mmol/L    Chloride 109 95 - 110 mmol/L    CO2 20 (L) 23 - 29 mmol/L    Glucose 74 70 - 110 mg/dL    BUN 10 6 - 20 mg/dL    Creatinine 3.5 (H) 0.5 - 1.4 mg/dL    Calcium 9.5 8.7 - 10.5 mg/dL    Total Protein 5.4 (L) 6.0 - 8.4 g/dL    Albumin 2.1 (L) 3.5 - 5.2 g/dL    Total Bilirubin 0.6 0.1 - 1.0 mg/dL    Alkaline Phosphatase 244 (H) 55 - 135 U/L    AST 40 10 - 40 U/L    ALT 22 10 - 44 U/L    eGFR 14 (A) >60 mL/min/1.73 m^2    Anion Gap 11 8 - 16 mmol/L       Microbiology Results (last 7 days)     Procedure Component Value Units Date/Time    Blood Culture #1 **CANNOT BE ORDERED STAT** [[id number]] Collected: 10/10/23 1201    Order Status: Completed Specimen: Blood from Peripheral, Forearm, Right Updated: 10/14/23 1303     Blood Culture, Routine No Growth after 4 days.    Blood Culture #2 **CANNOT BE ORDERED STAT** [[id number]] Collected: 10/10/23 1232    Order Status: Completed Specimen: Blood from Peripheral, Forearm, Right Updated: 10/14/23 1303     Blood Culture, Routine No Growth after 4 days.    Stool culture [[id number]] Collected: 10/11/23 1129    Order Status: Completed Specimen: Stool Updated: 10/14/23 0710     Stool Culture No Salmonella,Shigella,Vibrio,Campylobacter,Yersinia isolated.    E. coli 0157 antigen [[id number]] Collected: 10/11/23 1129    Order Status: Completed Specimen: Stool Updated: 10/12/23 1206     Shiga Toxin 1 E.coli Negative     Shiga Toxin 2 E.coli Negative    Clostridium difficile EIA [[id number]] Collected: 10/11/23 0350    Order Status: Completed Specimen: Stool Updated: 10/11/23 1147     C. diff  Antigen Negative     C difficile Toxins A+B, EIA Negative     Comment: Testing not recommended for children <24 months old.             Imaging Results          CT Chest Abdomen Pelvis With Contrast (xpd) (Final result)  Result time 10/10/23 16:47:13    Final result by Yaneth Ray MD (10/10/23 16:47:13)                 Impression:      No finding to correlate with the clinical history.    Two new small pulmonary nodules, nonspecific, can be followed on subsequent exams.    Minimal area of ground-glass opacity in the right lower lobe with a small parenchymal opacity in the left upper lobe, new compared to prior imaging, nonspecific, may relate to edema, developing infection, aspiration other etiology.    Possible small hypodensity in the pancreas    Additional findings as above      Electronically signed by: Yaneth Ray MD  Date:    10/10/2023  Time:    16:47             Narrative:    EXAMINATION:  CT CHEST ABDOMEN PELVIS WITH CONTRAST (XPD)    CLINICAL HISTORY:  Sepsis;    TECHNIQUE:  Low dose axial images, sagittal and coronal reformations were obtained from the thoracic inlet to the pubic symphysis following the IV administration of 75 mL of Omnipaque 350 and no oral contrast.  Note that the timing of the abdominal portion of the examination is early, basically arterial phase imaging.    COMPARISON:  July 2023    FINDINGS:  Chest:    Evaluation of the lungs is limited due to motion. Some artifact created due to pacing device along the left chest wall.    Cluster of tree-in-bud nodules within the anterior right upper lobe similar to prior imaging.  A few nodular foci within the lungs are stable.  There are new small nodules, series 2, image 67, image 77.    Mild ground-glass opacity at the right lung base new.  Minimal parenchymal opacity in the left upper lobe new.    Incompletely imaged dialysis access at the left axilla.  Adjacent calcification is felt to relate to vascular structure.  There is a  central venous catheter entering via the left subclavian, with tip at the right atrium.  There is no significant mediastinal or hilar lymphadenopathy.    The heart is enlarged.  There is no significant pleural effusion.  There is small pericardial effusion.  Calcification present along the wall of the aorta with mild ectasia of the ascending aorta.    Abdomen/pelvis:    Lines external to the patient create some artifact.  Timing of the contrast bolus also decreases sensitivity.  There is reflux of contrast into the inferior vena cava and hepatic veins likely relating to right heart dysfunction.    Evaluation of the liver parenchyma limited, with no definite abnormality seen.  Hypodensity seen on previous exam is not well visualized today.  The spleen is not enlarged.    The stomach is unremarkable.  There is cholelithiasis.  No evidence of biliary ductal dilatation.    There is no evidence of adrenal mass.  Questionable small hypodensity in the expected region of the pancreatic tail, evaluation of this region somewhat limited.    The right kidney demonstrates small size.  Multiple hypodense structures within the right kidney difficult to characterize well but appear grossly similar to prior imaging.    Left kidney has been removed.  Soft tissue density previously described in the nephrectomy bed is overall similar but difficult to distinguish from adjacent pancreatic tail.    There is advanced calcification along the wall of the aorta and branches.  No aortic aneurysm.  No significant periaortic lymphadenopathy.  There is calcification along the wall of inferior vena cava and branches.    Bladder not well distended or well evaluated.  Uterus is likely present.  The evaluation of the bowel is somewhat limited, with no evidence of bowel distension.                               X-Ray Chest AP Portable (Final result)  Result time 10/10/23 13:53:44    Final result by Yaneth Ray MD (10/10/23 13:53:44)                  Impression:      As above      Electronically signed by: Yaneth Ray MD  Date:    10/10/2023  Time:    13:53             Narrative:    EXAMINATION:  XR CHEST AP PORTABLE    CLINICAL HISTORY:  End stage renal disease    TECHNIQUE:  Single frontal view of the chest was performed.    COMPARISON:  August 25, 2023    FINDINGS:  The heart size is markedly enlarged but similar to previous study.  There are surgical changes within the soft tissues of the left arm.  There is a central venous catheter entering the left subclavian with tip in right atrium.  Pacer device present over the left chest.  Vascular calcification present along the wall of the aorta.    There is osseous demineralization.  There is no evidence of significant pleural fluid on this single view.  No evidence of focal consolidation.  Prominence of pulmonary vasculature similar to prior exam.                               X-Ray Humerus 2 View Left (Final result)  Result time 10/10/23 13:51:09    Final result by Cameron Pierce MD (10/10/23 13:51:09)                 Impression:      1. No convincing acute displaced fracture or dislocation of the left humerus, please see above for additional findings.      Electronically signed by: Cameron Pierce MD  Date:    10/10/2023  Time:    13:51             Narrative:    EXAMINATION:  XR HUMERUS 2 VIEW LEFT    CLINICAL HISTORY:  End stage renal disease    COMPARISON:  None    FINDINGS:  Two views left humerus.    There is osteopenia.  No acute displaced fracture or dislocation of the humerus.  Vascular stent projects over the axilla.  Surgical changes are noted of the soft tissues of the upper arm.  No acute displaced rib fracture.  There is coarse interstitial attenuation involving the visualized left lung zones.  Pacing device noted.  There is marked vascular calcification noting suspected upper extremity dialysis fistula.  There is cachectic change of the body wall.                               X-Ray  Foot Complete Left (Final result)  Result time 10/10/23 13:49:41    Final result by Cameron Pierce MD (10/10/23 13:49:41)                 Impression:      1. No acute displaced fracture or dislocation of the foot.      Electronically signed by: Cameron Pierce MD  Date:    10/10/2023  Time:    13:49             Narrative:    EXAMINATION:  XR FOOT COMPLETE 3 VIEW LEFT    CLINICAL HISTORY:  .  Unspecified open wound of unspecified toe(s) without damage to nail, initial encounter    TECHNIQUE:  AP, lateral and oblique views of the left foot were performed.    COMPARISON:  07/26/2023    FINDINGS:  Three views left foot.    There is diffuse osteopenia.  There is marked vascular calcification.  There are degenerative changes of the foot.  No convincing acute displaced fracture or dislocation of the foot.  No radiopaque foreign body.  No significant edema.                                    Pending Diagnostic Studies:     None         Medications:  Reconciled Home Medications:      Medication List      CONTINUE taking these medications    acetaminophen 500 MG tablet  Commonly known as: TYLENOL  Take 500 mg by mouth every 6 (six) hours as needed for Pain.     apixaban 2.5 mg Tab  Commonly known as: ELIQUIS  Take 1 tablet (2.5 mg total) by mouth 2 (two) times daily.     aspirin 81 MG Chew  Take 1 tablet (81 mg total) by mouth once daily.     CABOMETYX 60 mg Tab  Generic drug: cabozantinib  TAKE ONE TABLET BY MOUTH ONCE DAILY AT THE SAME TIME ON AN EMPTY STOMACH AT LEAST 1 HOUR BEFORE OR 2 HOURS AFTER EATING. AVOID GRAPEFRUIT PRODUCTS     cloNIDine 0.3 mg/24 hr td ptwk 0.3 mg/24 hr  Commonly known as: CATAPRES  Place 1 patch onto the skin every 7 days.     cyclobenzaprine 5 MG tablet  Commonly known as: FLEXERIL  TAKE 1 TABLET BY MOUTH DAILY AS NEEDED FOR MUSCLE SPASMS.     ENTRESTO 49-51 mg per tablet  Generic drug: sacubitriL-valsartan  TAKE 1 TABLET BY MOUTH TWICE A DAY     fluticasone propionate 50 mcg/actuation  nasal spray  Commonly known as: FLONASE  1 spray (50 mcg total) by Each Nostril route 2 (two) times daily as needed (ear fullness).     FosrenoL 1000 MG chewable tablet  Generic drug: lanthanum  Take 1 tablet by mouth.     hydrALAZINE 100 MG tablet  Commonly known as: APRESOLINE  Take 1 tablet (100 mg total) by mouth every 12 (twelve) hours.     HYDROcodone-acetaminophen 5-325 mg per tablet  Commonly known as: NORCO  Take 1 tablet by mouth every 6 (six) hours as needed for Pain.     levothyroxine 75 MCG tablet  Commonly known as: SYNTHROID  Take 1 tablet (75 mcg total) by mouth once daily.     LIDOcaine-prilocaine cream  Commonly known as: EMLA  APPLY ATLEAST 30 MINUTES BEFORE TREATMENT 3 TIMES A WEEK     metoprolol succinate 100 MG 24 hr tablet  Commonly known as: TOPROL-XL  Take 1/2 tablet (50mg) once daily     ONE-A-DAY WOMEN'S 50 PLUS 400-20 mcg Tab  Generic drug: mv,Ca,min-folic acid-vit K1  Take 1 tablet by mouth once daily.     RETACRIT INJ  Epoetin david - epbx (Retacrit)        ASK your doctor about these medications    naloxone 1 mg/mL injection  Commonly known as: NARCAN  2 mg (1 mg per nostril) by Nasal route as needed for opioid overdose; may repeat in 3 to 5 minutes if not effective. Call 911            Indwelling Lines/Drains at time of discharge:   Lines/Drains/Airways     Central Venous Catheter Line  Duration                Hemodialysis Catheter left subclavian -- days         Hemodialysis AV Graft 04/19/23 1057 Left upper arm 181 days                Time spent on the discharge of patient: 35 minutes         Danilo Olivier MD  Department of [facility] Medicine  [facility] [place] - Telemetry

## 2023-10-18 NOTE — ASSESSMENT & PLAN NOTE
- pt with severe protein calorie malnutrition with >12% body weight loss in a little over 2 months (8/2023: weight was 103 lbs and is now 88lbs);  reports weight has been even lower recently (in 80s)   - current BMI: 15.48; albumin 2.3  - improvement in appetite/food tolerance since admission and initiation of midodrine   - contributes to qualification for hospice if/when aligns with [facility]

## 2023-10-18 NOTE — PLAN OF CARE
Problem: Adult Inpatient Plan of Care  Goal: Plan of Care Review  Outcome: Ongoing, Progressing  Goal: Patient-Specific Goal (Individualized)  Outcome: Ongoing, Progressing  Goal: Absence of Hospital-Acquired Illness or Injury  Outcome: Ongoing, Progressing  Goal: Optimal Comfort and Wellbeing  Outcome: Ongoing, Progressing  Goal: Readiness for Transition of Care  Outcome: Ongoing, Progressing     Problem: Coping Ineffective  Goal: Effective Coping  Outcome: Ongoing, Progressing     Problem: Infection  Goal: Absence of Infection Signs and Symptoms  Outcome: Ongoing, Progressing     Problem: Adjustment to Illness (Sepsis/Septic Shock)  Goal: Optimal Coping  Outcome: Ongoing, Progressing     Problem: Bleeding (Sepsis/Septic Shock)  Goal: Absence of Bleeding  Outcome: Ongoing, Progressing     Problem: Glycemic Control Impaired (Sepsis/Septic Shock)  Goal: Blood Glucose Level Within Desired Range  Outcome: Ongoing, Progressing     Problem: Infection Progression (Sepsis/Septic Shock)  Goal: Absence of Infection Signs and Symptoms  Outcome: Ongoing, Progressing     Problem: Nutrition Impaired (Sepsis/Septic Shock)  Goal: Optimal Nutrition Intake  Outcome: Ongoing, Progressing     Problem: Impaired Wound Healing  Goal: Optimal Wound Healing  Outcome: Ongoing, Progressing     Problem: Skin Injury Risk Increased  Goal: Skin Health and Integrity  Outcome: Ongoing, Progressing     Problem: Device-Related Complication Risk (Hemodialysis)  Goal: Safe, Effective Therapy Delivery  Outcome: Ongoing, Progressing     Problem: Hemodynamic Instability (Hemodialysis)  Goal: Effective Tissue Perfusion  Outcome: Ongoing, Progressing     Problem: Infection (Hemodialysis)  Goal: Absence of Infection Signs and Symptoms  Outcome: Ongoing, Progressing     Problem: Fall Injury Risk  Goal: Absence of Fall and Fall-Related Injury  Outcome: Ongoing, Progressing

## 2023-10-18 NOTE — NURSING
[facility]  Nurses Note -- 4 Eyes      10/18/2023       Skin assessed on: Q Shift      [] No Pressure Injuries Present    []Prevention Measures Documented    [x] Yes LDA  for Pressure Injury Previously documented     [] Yes New Pressure Injury Discovered   [] LDA for New Pressure Injury Added      Attending RN:  Valerie Gross RN     Second RN:  Dick Marquez RN

## 2023-10-18 NOTE — PLAN OF CARE
SW notified that patient spouse agreed to hospice services. Shauna ([facility]) asked for SW to have Nephrologist contact her, and Home hospice POC can be written.    JOSHUA secure Nephrologist with Shauna ([facility]) contact # to call, and request for Home Hospice POC.     JOSHUA left message for clinical manager at [facility] requesting return call.

## 2023-10-18 NOTE — SUBJECTIVE & OBJECTIVE
Medications:  Continuous Infusions:  Scheduled Meds:   sodium chloride 0.9%   Intravenous Once    apixaban  2.5 mg Oral BID    droNABinol  2.5 mg Oral BID    epoetin david (PROCRIT) injection  4,000 Units Intravenous Every Tues, Thurs, Sat    lactobacillus acidophilus & bulgar  1 packet Oral BID    lanthanum  1,000 mg Oral TID WM    levothyroxine  75 mcg Oral Before breakfast    metoprolol succinate  50 mg Oral Daily    midodrine  5 mg Oral BID WM    sodium chloride 0.9%  10 mL Intravenous Q8H    vitamin renal formula (B-complex-vitamin c-folic acid)  1 capsule Oral Daily     PRN Meds:sodium chloride 0.9%, acetaminophen, dextrose 10%, dextrose 10%, glucagon (human recombinant), glucose, glucose, HYDROcodone-acetaminophen, loperamide, melatonin, naloxone, ondansetron, prochlorperazine, sodium chloride 0.9%    Objective:     Vital Signs (Most Recent):  Temp: 98 °F (36.7 °C) (10/18/23 0937)  Pulse: 87 (10/18/23 0937)  Resp: 16 (10/18/23 0937)  BP: 124/66 (10/18/23 0937)  SpO2: 95 % (10/18/23 0937) Vital Signs (24h Range):  Temp:  [96 °F (35.6 °C)-98 °F (36.7 °C)] 98 °F (36.7 °C)  Pulse:  [78-93] 87  Resp:  [16-20] 16  SpO2:  [94 %-100 %] 95 %  BP: ()/(62-86) 124/66     Weight: 40 kg (88 lb 2.9 oz)  Body mass index is 15.14 kg/m².       Physical Exam  Vitals and nursing note reviewed.   Constitutional:       General: She is sleeping.      Appearance: She is cachectic. She is ill-appearing.      Comments: Frail, appears older than age   HENT:      Head: Atraumatic.      Comments: Bilateral temporal wasting   Pulmonary:      Effort: No respiratory distress.   Musculoskeletal:      Comments: Muscular atrophy to extremities    Neurological:      Mental Status: She is oriented to person, place, and time and easily aroused. She is lethargic.      Comments: Oriented; answered questions appropriately; improved mental status and alertness compared to admit; more participatory in discussions   Psychiatric:         Mood and  Affect: Affect is flat.     Advance Care Planning   Advance Directives:   Goals of Care: What is most important right now is to focus on spending time at home, avoiding the hospital, remaining as independent as possible, quality of life, even if it means sacrificing a little time, improvement in condition but with limits to invasive therapies, comfort and QOL . Accordingly, we have decided that the best plan to meet the patient's goals includes enrolling in hospice care     Significant Labs: All pertinent labs within the past 24 hours have been reviewed.    CBC:   Recent Labs   Lab 10/16/23  0529   WBC 5.52   HGB 10.8*   HCT 35.9*   *          BMP:  Recent Labs   Lab 10/18/23  0523   GLU 74      K 3.5      CO2 20*   BUN 10   CREATININE 3.5*   CALCIUM 9.5       LFT:  Lab Results   Component Value Date    AST 40 10/18/2023     (H) 10/12/2006    ALKPHOS 244 (H) 10/18/2023    BILITOT 0.6 10/18/2023     Albumin:   Albumin   Date Value Ref Range Status   10/18/2023 2.1 (L) 3.5 - 5.2 g/dL Final     Protein:   Total Protein   Date Value Ref Range Status   10/18/2023 5.4 (L) 6.0 - 8.4 g/dL Final     Lactic acid:   Lab Results   Component Value Date    LACTATE 1.6 10/16/2023    LACTATE 2.1 10/15/2023     Significant Imaging: I have reviewed all pertinent imaging results/findings within the past 24 hours.

## 2023-10-18 NOTE — PLAN OF CARE
Problem: Adult Inpatient Plan of Care  Goal: Plan of Care Review  Outcome: Met  Goal: Patient-Specific Goal (Individualized)  Outcome: Met  Goal: Absence of Hospital-Acquired Illness or Injury  Outcome: Met  Goal: Optimal Comfort and Wellbeing  Outcome: Met  Goal: Readiness for Transition of Care  Outcome: Met     Problem: Coping Ineffective  Goal: Effective Coping  Outcome: Met     Problem: Infection  Goal: Absence of Infection Signs and Symptoms  Outcome: Met     Problem: Adjustment to Illness (Sepsis/Septic Shock)  Goal: Optimal Coping  Outcome: Met     Problem: Bleeding (Sepsis/Septic Shock)  Goal: Absence of Bleeding  Outcome: Met     Problem: Glycemic Control Impaired (Sepsis/Septic Shock)  Goal: Blood Glucose Level Within Desired Range  Outcome: Met     Problem: Infection Progression (Sepsis/Septic Shock)  Goal: Absence of Infection Signs and Symptoms  Outcome: Met     Problem: Nutrition Impaired (Sepsis/Septic Shock)  Goal: Optimal Nutrition Intake  Outcome: Met     Problem: Impaired Wound Healing  Goal: Optimal Wound Healing  Outcome: Met     Problem: Skin Injury Risk Increased  Goal: Skin Health and Integrity  Outcome: Met     Problem: Device-Related Complication Risk (Hemodialysis)  Goal: Safe, Effective Therapy Delivery  Outcome: Met     Problem: Hemodynamic Instability (Hemodialysis)  Goal: Effective Tissue Perfusion  Outcome: Met     Problem: Infection (Hemodialysis)  Goal: Absence of Infection Signs and Symptoms  Outcome: Met     Problem: Physical Therapy  Goal: Physical Therapy Goal  Description: Goals to be met by: 10/27/23     Patient will increase functional independence with mobility by performin. Supine to sit with Stand-by Assistance  2. Rolling to Left and Right with Stand-by Assistance  3. Sit to stand transfer with Stand-by Assistance using RW  4. Bed to chair transfer with Stand-by Assistance using Rolling Walker  5. Gait x50 feet with Stand-by Assistance using Rolling Walker   6.  Wheelchair propulsion x50 feet with Stand-by Assistance using bilateral upper/lower extremities  7. Lower extremity exercise program x10 reps per handout, with SBA    Outcome: Met     Problem: Occupational Therapy  Goal: Occupational Therapy Goal  Description: Goals to be met by: 10/27/23     Patient will increase functional independence with ADLs by performing:    Grooming while standing at sink (w/ seated rest breaks) with Stand-by Assistance.  Toileting from toilet with Stand-by Assistance for hygiene and clothing management.   Sit to stand with Stand-by Assistance.   Step transfer with Stand-by Assistance  Toilet transfer to toilet with Stand-by Assistance.  Upper extremity exercise program x15 reps per handout, with independence.    Outcome: Met     Problem: Fall Injury Risk  Goal: Absence of Fall and Fall-Related Injury  Outcome: Met

## 2023-10-18 NOTE — NURSING
[facility]  Nurses Note -- 4 Eyes      10/18/2023       Skin assessed on: Q Shift      [] No Pressure Injuries Present    []Prevention Measures Documented    [x] Yes LDA  for Pressure Injury Previously documented     [] Yes New Pressure Injury Discovered   [] LDA for New Pressure Injury Added      Attending RN:  Ana Laura Marquez RN     Second RN:  FELIPE Hayward

## 2023-10-18 NOTE — ASSESSMENT & PLAN NOTE
- long term HD pt  - significant kidney disease in family with pt's son (in 30s) also on HD as discussed in previous admission  -  no complications with HD recently and wish to cont HD as long as pt is a candidate   - hospice company coordinating with pt's outpatient nephrology team for plan of care for continued HD   - noted; continued management per nephrology and hospital primary

## 2023-10-18 NOTE — ASSESSMENT & PLAN NOTE
- noted   - now that GOC have shifted to DNR; de-activation of defibrillator should be discussed prior to discharge home, recommend magnet available until discharge; discussed with Dr. Cheney and CEZAR/Dandre LEWIS   - discussed de-activation with ; agreeable to turning off defibrillation componentace per primary for de-activation prior to discharge home with hospice

## 2023-10-18 NOTE — PROGRESS NOTES
[facility] - Telemetry  Palliative Medicine  Progress Note    Patient Name: Eva Bloom  MRN: [id number]  Admission Date: 10/10/2023  Hospital Length of Stay: 8 days  Code Status: DNR   Attending Provider: Dnailo Olivier MD  Consulting Provider: Leila Munoz NP  Primary Care Physician: Sowmya Mccain MD  Principal Problem:Severe sepsis    Patient information was obtained from patient, spouse/SO and primary team.      Assessment/Plan:   Advance Care Planning     Cardiac/Vascular  Chronic combined systolic and diastolic heart failure  - chronic history noted; EF 35% on most recent echo   - diastolic HF that does cause intermittent SOB at rest and affects exertion/ADLs at times per discussion with family on abilities prior to admit   - pt and  previously with fair insight into life-limiting condition trajectory   -  contributes to appropriateness for hospice if/when aligns with GOC    ICD (implantable cardioverter-defibrillator) in place - SubQ  - noted   - now that GOC have shifted to DNR; de-activation of defibrillator should be discussed prior to discharge home, recommend magnet available until discharge; discussed with Dr. Cheney and CEZAR/Dandre LEWIS   - discussed de-activation with ; agreeable to turning off defibrillation componentace per primary for de-activation prior to discharge home with hospice       Renal/  ESRD (end stage renal disease) on dialysis  - long term HD pt  - significant kidney disease in family with pt's son (in 30s) also on HD as discussed in previous admission  -  no complications with HD recently and wish to cont HD as long as pt is a candidate   - hospice company coordinating with pt's outpatient nephrology team for plan of care for continued HD   - noted; continued management per nephrology and hospital primary     Oncology  Metastatic renal cell carcinoma to lung, unspecified laterality  - follows up with oncology as outpatient with recent visit 9/2023  - on  cabozantinib (immunotherapy) as outpatient +  - pt and family now agreeable to hospice with [facility]; discussed discontinuing current immunotherapy regimen with transition to hospice; pt has not received this medication since admission and  is ok with not resuming once home  - hospice qualifying diagnosis; would qualify to continue HD with this diagnosis     Endocrine  Severe protein-calorie malnutrition  - pt with severe protein calorie malnutrition with >12% body weight loss in a little over 2 months (8/2023: weight was 103 lbs and is now 88lbs);  reports weight has been even lower recently (in 80s)   - current BMI: 15.48; albumin 2.3  - improvement in appetite/food tolerance since admission and initiation of midodrine   - contributes to qualification for hospice if/when aligns with [facility]       Palliative Care  ACP (advance care planning)  10/18/2023  - interval chart reviewed in detail; discussed pt/plan to transition to home hospice with hospice provider and MDT   - followed up with pt and  at bedside; Otis confirms plan to transition home with [facility] today if possible; awaiting DME delivery and de-activation of AICD  - reviewed portions of pt's previous outpatient care that would no longer be part of pt's plan of care so these do not come as a shock during hospice admission process   -  seems to have good insight into overall plan of care and philosophy of care for pt on hospice; he is hopeful she will be able to continue HD as long as she tolerates is but understands that may become an issue in the future given overall status/decline   - discussed his concerns on what to do if certain situations arise; encourage trust building with hospice team and their 24/7 availability; did share these concerns with hospice provider and asked for thorough explanations and encouragement on intake, as this is all very new to family and their GOC have progressed significantly this  "admission   - emotional support provided to family; allowed time for questions/concerns, all addressed   - updated CM/SW and hospice provider       I will follow-up with patient. Please contact us if you have any additional questions.    Total visit time: 55 minutes    35 min visit time including: face to face time in discussion of symptom assessment, and exploring options and burdens of offered treatments.  This also includes non-face to face time preparing to see the patient including chart review, obtaining and/or reviewing separately obtained history, documenting clinical information in the electronic or other health record, independently interpreting results and communicating results to the patient/family/caregiver, family discussions by phone if not able to be present, coordination of care with other specialists, and discharge planning.     20 min ACP time spent: goals of care, advanced care planning, emotional support, formulating and communicating prognosis, exploring burden/ benefit of various approaches of treatment.     Leila Munoz NP  Palliative Medicine  [facility]    Subjective:     Chief Complaint:   Chief Complaint   Patient presents with    Weight Loss     Pt;s  reports pt is losing weight and sleeping a lot.  Decreased appetite since x6 days.  PT is a dialysis pt Tu-Th-Sat.         HPI:   From H&P; "Ms Eva Bloom is a 58 y.o. woman with ESRD on HD TTS via THDC, systolic/diastolic CHF, PAD with toe wounds who presents for weight loss.  reports weight loss over last few weeks. Her appetite is poor but stable. No nausea, vomiting, dysphagia, abdominal pain. Reports diarrhea liquid stools 3-4x/day over last week. Does not make urine. She is s/p recent L AVG excision, site is healing well with no drainage or erythema. S/p L THDC placement, also working well with no issues. She does have L 3rd toe ulceration present, following with Podiatry. Was " "prescribed doxycycline after last appointment but never started it. Does have RCC following with Oncology on immunotherapy.      In ED, noted hypothermic and drowsy. Lactic elevated. Started warming blanket and broad antibiotics. Admitted to ICU. "    Palliative medicine consulted for continuity of care, goals of care discussion, and advance care planning; for details of visit, see advance care planning section of plan.         Hospital Course:  No notes on file    Medications:  Continuous Infusions:  Scheduled Meds:   sodium chloride 0.9%   Intravenous Once    apixaban  2.5 mg Oral BID    droNABinol  2.5 mg Oral BID    epoetin david (PROCRIT) injection  4,000 Units Intravenous Every Tues, Thurs, Sat    lactobacillus acidophilus & bulgar  1 packet Oral BID    lanthanum  1,000 mg Oral TID WM    levothyroxine  75 mcg Oral Before breakfast    metoprolol succinate  50 mg Oral Daily    midodrine  5 mg Oral BID WM    sodium chloride 0.9%  10 mL Intravenous Q8H    vitamin renal formula (B-complex-vitamin c-folic acid)  1 capsule Oral Daily     PRN Meds:sodium chloride 0.9%, acetaminophen, dextrose 10%, dextrose 10%, glucagon (human recombinant), glucose, glucose, HYDROcodone-acetaminophen, loperamide, melatonin, naloxone, ondansetron, prochlorperazine, sodium chloride 0.9%    Objective:     Vital Signs (Most Recent):  Temp: 98 °F (36.7 °C) (10/18/23 0937)  Pulse: 87 (10/18/23 0937)  Resp: 16 (10/18/23 0937)  BP: 124/66 (10/18/23 0937)  SpO2: 95 % (10/18/23 0937) Vital Signs (24h Range):  Temp:  [96 °F (35.6 °C)-98 °F (36.7 °C)] 98 °F (36.7 °C)  Pulse:  [78-93] 87  Resp:  [16-20] 16  SpO2:  [94 %-100 %] 95 %  BP: ()/(62-86) 124/66     Weight: 40 kg (88 lb 2.9 oz)  Body mass index is 15.14 kg/m².       Physical Exam  Vitals and nursing note reviewed.   Constitutional:       General: She is sleeping.      Appearance: She is cachectic. She is ill-appearing.      Comments: Frail, appears older than age   HENT: "      Head: Atraumatic.      Comments: Bilateral temporal wasting   Pulmonary:      Effort: No respiratory distress.   Musculoskeletal:      Comments: Muscular atrophy to extremities    Neurological:      Mental Status: She is oriented to person, place, and time and easily aroused. She is lethargic.      Comments: Oriented; answered questions appropriately; improved mental status and alertness compared to admit; more participatory in discussions   Psychiatric:         Mood and Affect: Affect is flat.     Advance Care Planning  Advance Directives:   Goals of Care: What is most important right now is to focus on spending time at home, avoiding the hospital, remaining as independent as possible, quality of life, even if it means sacrificing a little time, improvement in condition but with limits to invasive therapies, comfort and QOL . Accordingly, we have decided that the best plan to meet the patient's goals includes enrolling in hospice care     Significant Labs: All pertinent labs within the past 24 hours have been reviewed.    CBC:   Recent Labs   Lab 10/16/23  0529   WBC 5.52   HGB 10.8*   HCT 35.9*   *          BMP:  Recent Labs   Lab 10/18/23  0523   GLU 74      K 3.5      CO2 20*   BUN 10   CREATININE 3.5*   CALCIUM 9.5       LFT:  Lab Results   Component Value Date    AST 40 10/18/2023     (H) 10/12/2006    ALKPHOS 244 (H) 10/18/2023    BILITOT 0.6 10/18/2023     Albumin:   Albumin   Date Value Ref Range Status   10/18/2023 2.1 (L) 3.5 - 5.2 g/dL Final     Protein:   Total Protein   Date Value Ref Range Status   10/18/2023 5.4 (L) 6.0 - 8.4 g/dL Final     Lactic acid:   Lab Results   Component Value Date    LACTATE 1.6 10/16/2023    LACTATE 2.1 10/15/2023     Significant Imaging: I have reviewed all pertinent imaging results/findings within the past 24 hours.      Leila Munoz NP  Palliative Medicine  [facility]

## 2023-10-18 NOTE — CONSULTS
Consult received to deactivate the defibrillator.    I discussed the case with the patient's family member at the bedside.  The patient has metastatic renal cell carcinoma.  [employer] was contacted.  They will come by today to turn off the subcutaneous ICD.

## 2023-10-18 NOTE — ASSESSMENT & PLAN NOTE
- follows up with oncology as outpatient with recent visit 9/2023  - on cabozantinib (immunotherapy) as outpatient +  - pt and family now agreeable to hospice with [facility]; discussed discontinuing current immunotherapy regimen with transition to hospice; pt has not received this medication since admission and  is ok with not resuming once home  - hospice qualifying diagnosis; would qualify to continue HD with this diagnosis

## 2023-10-18 NOTE — PROGRESS NOTES
[facility] - Intensive Care  Adult Nutrition  Consult Note    SUMMARY     Recommendations    1. Consider appetite stimulant to help increase PO intake.    2. Continue to monitor PO intake of meals and snacks.    3. Continue to monitor weights and labs    4. Collaboration with medical providers  5. Consider Novasource Renal BID to help meet EEN/EPN    Goals: 1. Pt to meet % EEN/EPN by RD follow up  Nutrition Goal Status: continues  Communication of RD Recs:  (POC)    Assessment and Plan    Nutrition Problem:  Moderate/Severe Protein-Calorie Malnutrition  Malnutrition in the context of Chronic Illness/Injury    Related to (etiology):  Diagnosis related symptoms    Signs and Symptoms (as evidenced by):  Energy Intake: <50% of estimated ergy requirement for > 1 year  Body Fat Depletion: severe depletion of orbitals, triceps, and thoracic and lumbar region   Muscle Mass Depletion: severe depletion of temples, clavicle region, scapular region, interosseous muscle, and lower extremities   Weight Loss: 17% x 1 year       Interventions(treatment strategy):  Collaboration with medical providers    Nutrition Diagnosis Status:  continues    Reason for Assessment    Reason For Assessment: consult  Diagnosis: infection/sepsis  Relevant Medical History:   Past Medical History:   Diagnosis Date    Anemia     Anticoagulant long-term use     Atrial fibrillation     Bronchitis 03/01/2017    Cancer 2016    kidney cancer    CHF (congestive heart failure), NYHA class II, chronic, systolic     CMV (cytomegalovirus) antibody positive     Encounter for blood transfusion     ESRD (end stage renal disease)     Essential hypertension 09/23/2015    H/O herpes simplex type 2 infection     Herpes simplex type 1 antibody positive     History of kidney cancer     s/p left nephrectomy 1/2016    Hyperparathyroidism, unspecified     Hyperuricemia without signs of inflammatory arthritis and tophaceous disease     Kidney stones     LGSIL (low  "grade squamous intraepithelial dysplasia)     Myocardiopathy 07/21/2017    Prediabetes     Proteinuria     Renal disorder     Thyroid nodule     Urate nephropathy       Interdisciplinary Rounds: did not attend  General Information Comments: RD follow up. Pt continues on Renal Diet. Pt intake about 75% of meals. Continue to monitor PO intake. Pt is catchetic  with toe wounds.    10/11/23 RD consult for impaired nutrition. Pt currently on renal diet with about 25% intake since admit. Reports poor appetite.  BMI 15.48, protein energy malnutrition grade lll. Denies N/V/D/C. Pt is catchetic  with toe wounds.  Nutrition Discharge Planning: renal diet    Nutrition Risk Screen    Nutrition Risk Screen: reduced oral intake over the last month    Nutrition/Diet History    Spiritual, Cultural Beliefs, Taoism Practices, Values that Affect Care: no  Food Allergies: NKFA    Anthropometrics    Temp: 98 °F (36.7 °C)  Height Method: Stated  Height: 5' 4" (162.6 cm)  Height (inches): 64 in  Weight Method: Bed Scale  Weight: 40 kg (88 lb 2.9 oz)  Weight (lb): 88.18 lb  Ideal Body Weight (IBW), Female: 120 lb  % Ideal Body Weight, Female (lb): 75 %  BMI (Calculated): 15.1  BMI Grade: less than 16 protein-energy malnutrition grade III       Lab/Procedures/Meds    Pertinent Labs Reviewed: reviewed  BMP  Lab Results   Component Value Date     10/18/2023    K 3.5 10/18/2023     10/18/2023    CO2 20 (L) 10/18/2023    BUN 10 10/18/2023    CREATININE 3.5 (H) 10/18/2023    CALCIUM 9.5 10/18/2023    ANIONGAP 11 10/18/2023    EGFRNORACEVR 14 (A) 10/18/2023      Pertinent Medications Reviewed: reviewed  Current Outpatient Medications   Medication Instructions    acetaminophen (TYLENOL) 500 mg, Oral, Every 6 hours PRN    apixaban (ELIQUIS) 2.5 mg, Oral, 2 times daily    aspirin 81 mg, Oral, Daily    cabozantinib (CABOMETYX) 60 mg Tab TAKE ONE TABLET BY MOUTH ONCE DAILY AT THE SAME TIME ON AN EMPTY STOMACH AT LEAST 1 HOUR BEFORE OR 2 " HOURS AFTER EATING. AVOID GRAPEFRUIT PRODUCTS    cloNIDine 0.3 mg/24 hr td ptwk (CATAPRES) 0.3 mg/24 hr 1 patch, Transdermal, Every 7 days    cyclobenzaprine (FLEXERIL) 5 mg, Oral    ENTRESTO 49-51 mg per tablet 1 tablet, Oral, 2 times daily    epoetin david-epbx (RETACRIT INJ) Epoetin david - epbx (Retacrit)    fluticasone propionate (FLONASE) 50 mcg, Each Nostril, 2 times daily PRN    hydrALAZINE (APRESOLINE) 100 mg, Oral, Every 12 hours    HYDROcodone-acetaminophen (NORCO) 5-325 mg per tablet 1 tablet, Oral, Every 6 hours PRN    lanthanum (FOSRENOL) 1000 MG chewable tablet 1 tablet, Oral    levothyroxine (SYNTHROID) 75 mcg, Oral, Daily    lidocaine-prilocaine (EMLA) cream APPLY ATLEAST 30 MINUTES BEFORE TREATMENT 3 TIMES A WEEK    metoprolol succinate (TOPROL-XL) 100 MG 24 hr tablet Take 1/2 tablet (50mg) once daily    mv,Ca,min-folic acid-vit K1 (ONE-A-DAY WOMEN'S 50 PLUS) 400-20 mcg Tab 1 tablet, Oral, Daily    naloxone (NARCAN) 1 mg/mL injection 2 mg (1 mg per nostril) by Nasal route as needed for opioid overdose; may repeat in 3 to 5 minutes if not effective. Call 911           Estimated/Assessed Needs    Weight Used For Calorie Calculations: 54.4 kg (120 lb)  Energy Calorie Requirements (kcal): 1324 kcal  Energy Need Method: [place]-St Jeor (1.2 kcal x ibw)  Protein Requirements: 55 g  Weight Used For Protein Calculations: 54.4 kg (120 lb)     Estimated Fluid Requirement Method: RDA Method  RDA Method (mL): 1324         Nutrition Prescription Ordered    Current Diet Order: renal diet    Evaluation of Received Nutrient/Fluid Intake    I/O: +449 mL  Energy Calories Required: not meeting needs  Protein Required: not meeting needs  Fluid Required: not meeting needs  Comments: lbm 10/11/23  % Intake of Estimated Energy Needs: 0 - 25 %  % Meal Intake: 25 - 50 %    Nutrition Risk    Level of Risk/Frequency of Follow-up: high       Monitor and Evaluation    Food and Nutrient Intake: food and beverage intake  Food and  Nutrient Adminstration: diet order  Knowledge/Beliefs/Attitudes: food and nutrition knowledge/skill, beliefs and attitudes  Physical Activity and Function: nutrition-related ADLs and IADLs, factors affecting access to physical activity  Anthropometric Measurements: weight, weight change, body mass index  Biochemical Data, Medical Tests and Procedures: gastrointestinal profile  Nutrition-Focused Physical Findings: overall appearance       Nutrition Follow-Up    RD Follow-up?: Yes    Yue Valdes, Registration Eligible, Provisional LDN

## 2023-10-18 NOTE — PLAN OF CARE
[facility] - Telemetry  Discharge Final Note    Primary Care Provider: Sowmya Mccain MD    Expected Discharge Date: 10/18/2023    Final Discharge Note (most recent)       Final Note - 10/18/23 1102          Final Note    Assessment Type Final Discharge Note     Anticipated Discharge Disposition Hospice/Home     What phone number can be called within the next 1-3 days to see how you are doing after discharge? [phone]     Hospital Resources/Appts/Education Provided Appointments scheduled and added to AVS        Post-Acute Status    Post-Acute Authorization Hospice;Dialysis     Hospice Status Set-up Complete/Auth obtained     Diaylsis Status Set-up Complete/Auth obtained     Coverage Medicare A/B     Discharge Delays None known at this time                     Important Message from Medicare             Contact Info       [facility]    [address]  [place] [address]   Phone: [phone]       Next Steps: Go to    Instructions: Appointment TTHS at 5:15 am    [facility]    [address]  [place] [address]   Phone: [phone]       Next Steps: Call    Instructions: Hospice          SW confirmed with Camilla ([facility] [place] [facility]) that patient dialysis chair is still available. Patient is able to resume dialysis TTHS at 5:15 am.     JOSHUA informed nurse Valerie that patient cleared from case management standpoint.

## 2023-10-20 ENCOUNTER — OFFICE VISIT (OUTPATIENT)
Dept: VASCULAR SURGERY | Facility: CLINIC | Age: 58
End: 2023-10-20
Payer: COMMERCIAL

## 2023-10-20 VITALS
SYSTOLIC BLOOD PRESSURE: 78 MMHG | HEART RATE: 80 BPM | HEIGHT: 64 IN | TEMPERATURE: 98 F | WEIGHT: 76 LBS | BODY MASS INDEX: 12.97 KG/M2 | DIASTOLIC BLOOD PRESSURE: 52 MMHG

## 2023-10-20 DIAGNOSIS — C64.9 METASTATIC RENAL CELL CARCINOMA TO LUNG, UNSPECIFIED LATERALITY: Primary | ICD-10-CM

## 2023-10-20 DIAGNOSIS — Z99.2 ESRD (END STAGE RENAL DISEASE) ON DIALYSIS: Chronic | ICD-10-CM

## 2023-10-20 DIAGNOSIS — N18.6 ESRD (END STAGE RENAL DISEASE) ON DIALYSIS: Chronic | ICD-10-CM

## 2023-10-20 DIAGNOSIS — C78.00 METASTATIC RENAL CELL CARCINOMA TO LUNG, UNSPECIFIED LATERALITY: Primary | ICD-10-CM

## 2023-10-20 PROCEDURE — 3074F PR MOST RECENT SYSTOLIC BLOOD PRESSURE < 130 MM HG: ICD-10-PCS | Mod: CPTII,S$GLB,, | Performed by: SURGERY

## 2023-10-20 PROCEDURE — 3078F PR MOST RECENT DIASTOLIC BLOOD PRESSURE < 80 MM HG: ICD-10-PCS | Mod: CPTII,S$GLB,, | Performed by: SURGERY

## 2023-10-20 PROCEDURE — 1159F MED LIST DOCD IN RCRD: CPT | Mod: CPTII,S$GLB,, | Performed by: SURGERY

## 2023-10-20 PROCEDURE — 3074F SYST BP LT 130 MM HG: CPT | Mod: CPTII,S$GLB,, | Performed by: SURGERY

## 2023-10-20 PROCEDURE — 3078F DIAST BP <80 MM HG: CPT | Mod: CPTII,S$GLB,, | Performed by: SURGERY

## 2023-10-20 PROCEDURE — 3044F HG A1C LEVEL LT 7.0%: CPT | Mod: CPTII,S$GLB,, | Performed by: SURGERY

## 2023-10-20 PROCEDURE — 99024 PR POST-OP FOLLOW-UP VISIT: ICD-10-PCS | Mod: S$GLB,,, | Performed by: SURGERY

## 2023-10-20 PROCEDURE — 3066F PR DOCUMENTATION OF TREATMENT FOR NEPHROPATHY: ICD-10-PCS | Mod: CPTII,S$GLB,, | Performed by: SURGERY

## 2023-10-20 PROCEDURE — 99999 PR PBB SHADOW E&M-EST. PATIENT-LVL IV: CPT | Mod: PBBFAC,,, | Performed by: SURGERY

## 2023-10-20 PROCEDURE — 1160F PR REVIEW ALL MEDS BY PRESCRIBER/CLIN PHARMACIST DOCUMENTED: ICD-10-PCS | Mod: CPTII,S$GLB,, | Performed by: SURGERY

## 2023-10-20 PROCEDURE — 4010F ACE/ARB THERAPY RXD/TAKEN: CPT | Mod: CPTII,S$GLB,, | Performed by: SURGERY

## 2023-10-20 PROCEDURE — 99024 POSTOP FOLLOW-UP VISIT: CPT | Mod: S$GLB,,, | Performed by: SURGERY

## 2023-10-20 PROCEDURE — 1160F RVW MEDS BY RX/DR IN RCRD: CPT | Mod: CPTII,S$GLB,, | Performed by: SURGERY

## 2023-10-20 PROCEDURE — 1159F PR MEDICATION LIST DOCUMENTED IN MEDICAL RECORD: ICD-10-PCS | Mod: CPTII,S$GLB,, | Performed by: SURGERY

## 2023-10-20 PROCEDURE — 3066F NEPHROPATHY DOC TX: CPT | Mod: CPTII,S$GLB,, | Performed by: SURGERY

## 2023-10-20 PROCEDURE — 99999 PR PBB SHADOW E&M-EST. PATIENT-LVL IV: ICD-10-PCS | Mod: PBBFAC,,, | Performed by: SURGERY

## 2023-10-20 PROCEDURE — 3044F PR MOST RECENT HEMOGLOBIN A1C LEVEL <7.0%: ICD-10-PCS | Mod: CPTII,S$GLB,, | Performed by: SURGERY

## 2023-10-20 PROCEDURE — 4010F PR ACE/ARB THEARPY RXD/TAKEN: ICD-10-PCS | Mod: CPTII,S$GLB,, | Performed by: SURGERY

## 2023-10-20 NOTE — PROGRESS NOTES
VASCULAR SURGERY SERVICE    CHIEF COMPLAINT:  Functional left AV graft dysfunctional left AV graft    HISTORY OF PRESENT ILLNESS: Eva Bloom is a 58 y.o. female end-stage renal disease, with multiple serious medical comorbidities including admission for sepsis 2 months ago, EF 20%, AFib on Eliquis, who is having increasing bleeding after her dialysis runs.  I placed a revision, left AV graft with long interposition Accu Seal 12/28/2022.  She has had uninterrupted use of this graft ever since.    Recent weeks she is had increased bleeding after dialysis sticks    S/p:    1aaa.  Excision, left AV graft for infection 9/27/2023 (Toni) (07/21/2023, Dr. Werner)  1aa. Reclosure, L AVG conter-incision 5/1/2023  1a. PTA, L AVG 4/19/2023  Excision, excluded L AVG and permacath 1/25/2023  revision, left AV graft with long interposition Accu Seal 12/28/2022  Original left AV graft 2017 with subsequent covered stent placement in venous outflow    09/1/2023:  She now returns after excision of the left AV graft for infection proximally 5 weeks ago.  She is been dialyzing through a PermCath.    09/22/2023:  Now returns with breakdown of prior counter incision for graft excision.  Notably, she has metastatic renal cell carcinoma to the lungs    10/20/2023:  She now returns after excision of the left AV graft remnant.  She is continued to deteriorate with weight loss, now BMI 13, was recently hospitalized for her declining state, has now been put on hospice.  This point she is still dialyzing through a PermCath        Past Medical History:   Diagnosis Date    Anemia     Anticoagulant long-term use     Atrial fibrillation     Bronchitis 03/01/2017    Cancer 2016    kidney cancer    CHF (congestive heart failure), NYHA class II, chronic, systolic     CMV (cytomegalovirus) antibody positive     Encounter for blood transfusion     ESRD (end stage renal disease)     Essential hypertension 09/23/2015    H/O herpes simplex  type 2 infection     Herpes simplex type 1 antibody positive     History of kidney cancer     s/p left nephrectomy 1/2016    Hyperparathyroidism, unspecified     Hyperuricemia without signs of inflammatory arthritis and tophaceous disease     Kidney stones     LGSIL (low grade squamous intraepithelial dysplasia)     Myocardiopathy 07/21/2017    Prediabetes     Proteinuria     Renal disorder     Thyroid nodule     Urate nephropathy        Past Surgical History:   Procedure Laterality Date    BREAST CYST EXCISION      COLONOSCOPY N/A 11/12/2015    COLONOSCOPY N/A 3/12/2021    Procedure: COLONOSCOPY;  Surgeon: Brendon Lanier MD;  Location: [facility] ENDO;  Service: Endoscopy;  Laterality: N/A;  covid test 3/9, labs prior, prep instr mailed -ml    EXCISION OF ARTERIOVENOUS FISTULA Left 9/27/2023    Procedure: EXCISION, AV FISTULA;  Surgeon: FARIDEH Muñoz III, MD;  [facility];  Service: Vascular;  Laterality: Left;  LUE AV graft excision    INSERTION OF BIVENTRICULAR IMPLANTABLE CARDIOVERTER-DEFIBRILLATOR (ICD)  04/2021    INSERTION OF TUNNELED CENTRAL VENOUS CATHETER (CVC) WITH SUBCUTANEOUS PORT N/A 1/25/2023    Procedure: INSERTION, DUAL LUMEN CATHETER WITH PORT, WITH IMAGING GUIDANCE;  Surgeon: FARIDEH Muñoz III, MD;  [facility];  Service: Peripheral Vascular;  Laterality: N/A;  possinble permcath placment    NEPHRECTOMY-LAPAROSCOPIC Left 01/12/2016    PERCUTANEOUS TRANSLUMINAL ANGIOPLASTY OF ARTERIOVENOUS FISTULA Left 4/19/2023    Procedure: PTA, AV FISTULA;  Surgeon: FARIDEH Muñoz III, MD;  Location: [facility] CATH LAB;  Service: Peripheral Vascular;  Laterality: Left;    PERITONEAL CATHETER INSERTION      Permacath insertion  01/12/2017    PLACEMENT OF ARTERIOVENOUS GRAFT  12/28/2022    Procedure: INSERTION, GRAFT, ARTERIOVENOUS;  Surgeon: FARIDEH Muñoz III, MD;  [facility];  Service: Peripheral Vascular;;    REMOVAL, GRAFT, ARTERIOVENOUS, UPPER EXTREMITY Left  1/25/2023    Procedure: REMOVAL, GRAFT, ARTERIOVENOUS, UPPER EXTREMITY;  Surgeon: FARIDEH Muñoz III, MD;  Location: [facility] OR 2ND FLR;  Service: Peripheral Vascular;  Laterality: Left;    REVISION OF ARTERIOVENOUS FISTULA Left 5/1/2023    Procedure: REVISION, AV FISTULA;  Surgeon: FARIDEH Muñoz III, MD;  Location: [facility] OR 2ND FLR;  Service: Peripheral Vascular;  Laterality: Left;  LUE AVG revision    REVISION OF PROCEDURE INVOLVING ARTERIOVENOUS GRAFT Left 12/28/2022    Procedure: REVISION, PROCEDURE INVOLVING ARTERIOVENOUS GRAFT;  Surgeon: FARIDEH Muñoz III, MD;  Location: [facility] OR 2ND FLR;  Service: Peripheral Vascular;  Laterality: Left;    REVISION OF PROCEDURE INVOLVING ARTERIOVENOUS GRAFT Left 7/21/2023    Procedure: LEFT ARM ARTERIOVENOUS GRAFT EXCISION  AND LIGATION;  Surgeon: Obi Werner MD;  Location: [facility] OR;  Service: Vascular;  Laterality: Left;  Left AVG excision and revision with interposition    SALPINGOOPHORECTOMY Right 2016    KJB---DAVINCI    TONSILLECTOMY      TUBAL LIGATION           Current Outpatient Medications:     acetaminophen (TYLENOL) 500 MG tablet, Take 500 mg by mouth every 6 (six) hours as needed for Pain., Disp: , Rfl:     apixaban (ELIQUIS) 2.5 mg Tab, Take 1 tablet (2.5 mg total) by mouth 2 (two) times daily., Disp: 60 tablet, Rfl: 11    aspirin 81 MG Chew, Take 1 tablet (81 mg total) by mouth once daily., Disp: 30 tablet, Rfl: 11    cabozantinib (CABOMETYX) 60 mg Tab, TAKE ONE TABLET BY MOUTH ONCE DAILY AT THE SAME TIME ON AN EMPTY STOMACH AT LEAST 1 HOUR BEFORE OR 2 HOURS AFTER EATING. AVOID GRAPEFRUIT PRODUCTS, Disp: 30 tablet, Rfl: 4    cloNIDine 0.3 mg/24 hr td ptwk (CATAPRES) 0.3 mg/24 hr, Place 1 patch onto the skin every 7 days., Disp: 4 patch, Rfl: 11    cyclobenzaprine (FLEXERIL) 5 MG tablet, TAKE 1 TABLET BY MOUTH DAILY AS NEEDED FOR MUSCLE SPASMS., Disp: 30 tablet, Rfl: 1    ENTRESTO 49-51 mg per tablet, TAKE 1 TABLET BY MOUTH TWICE A DAY, Disp: 180  tablet, Rfl: 3    epoetin david-epbx (RETACRIT INJ), Epoetin david - epbx (Retacrit), Disp: , Rfl:     fluticasone propionate (FLONASE) 50 mcg/actuation nasal spray, 1 spray (50 mcg total) by Each Nostril route 2 (two) times daily as needed (ear fullness)., Disp: 16 g, Rfl: 2    HYDROcodone-acetaminophen (NORCO) 5-325 mg per tablet, Take 1 tablet by mouth every 6 (six) hours as needed for Pain., Disp: 10 tablet, Rfl: 0    lanthanum (FOSRENOL) 1000 MG chewable tablet, Take 1 tablet by mouth., Disp: , Rfl:     levothyroxine (SYNTHROID) 75 MCG tablet, Take 1 tablet (75 mcg total) by mouth once daily., Disp: 30 tablet, Rfl: 11    lidocaine-prilocaine (EMLA) cream, APPLY ATLEAST 30 MINUTES BEFORE TREATMENT 3 TIMES A WEEK, Disp: 30 g, Rfl: 11    metoprolol succinate (TOPROL-XL) 100 MG 24 hr tablet, Take 1/2 tablet (50mg) once daily, Disp: 90 tablet, Rfl: 3    mv,Ca,min-folic acid-vit K1 (ONE-A-DAY WOMEN'S 50 PLUS) 400-20 mcg Tab, Take 1 tablet by mouth once daily., Disp: , Rfl:     naloxone (NARCAN) 1 mg/mL injection, 2 mg (1 mg per nostril) by Nasal route as needed for opioid overdose; may repeat in 3 to 5 minutes if not effective. Call 911, Disp: 2 mL, Rfl: 11    hydrALAZINE (APRESOLINE) 100 MG tablet, Take 1 tablet (100 mg total) by mouth every 12 (twelve) hours., Disp: 180 tablet, Rfl: 2  No current facility-administered medications for this visit.    Facility-Administered Medications Ordered in Other Visits:     0.9%  NaCl infusion, , Intravenous, Continuous, Soni Bhatti MD, New Bag at 07/21/23 1116    Review of patient's allergies indicates:   Allergen Reactions    Coreg [carvedilol] Other (See Comments)     Nausea/vomiting    Allopurinol      Other reaction(s): abnormal transaminases       Family History   Problem Relation Age of Onset    Hypertension Mother     Diabetes Father     Kidney disease Father     No Known Problems Sister     Heart disease Sister     No Known Problems Sister     No Known Problems  "Brother     No Known Problems Brother     Cataracts Maternal Aunt     No Known Problems Maternal Uncle     No Known Problems Paternal Aunt     No Known Problems Paternal Uncle     No Known Problems Maternal Grandmother     Diabetes Maternal Grandfather     No Known Problems Paternal Grandmother     No Known Problems Paternal Grandfather     No Known Problems Son     No Known Problems Son     No Known Problems Other     Breast cancer Neg Hx     Colon cancer Neg Hx     Cancer Neg Hx     Stroke Neg Hx     Amblyopia Neg Hx     Blindness Neg Hx     Glaucoma Neg Hx     Macular degeneration Neg Hx     Retinal detachment Neg Hx     Strabismus Neg Hx     Thyroid disease Neg Hx        Social History     Tobacco Use    Smoking status: Never     Passive exposure: Never    Smokeless tobacco: Never   Substance Use Topics    Alcohol use: No    Drug use: Yes     Types: Hydrocodone       PHYSICAL EXAM:   BP (!) 78/52 (BP Location: Right arm, Patient Position: Sitting, BP Method: Small (Automatic))   Pulse 80   Temp 97.8 °F (36.6 °C) (Oral)   Ht 5' 4" (1.626 m)   Wt 34.5 kg (76 lb)   LMP 10/24/2016   BMI 13.05 kg/m²   Constitutional:  She appears very frail and deconditioned   Neurological: Normal speech  no focal findings  CN II - XII grossly intact.    Psychiatric: Mood and affect appropriate and symmetric.   HEENT: Normocephalic / atraumatic  PERRLA  Midline trachea  No scars across the neck   Cardiac: Regular rate and rhythm.   Pulmonary: Normal pulmonary effort.   Abdomen: Soft, not distended.     Skin: Warm and well perfused.    Vascular:  Radial pulses are nonpalpable bilaterally.   Extremities/  Musculoskeletal: No edema.   Left arm demonstrates an open incision proximally 15 mm x 8 mm.   been getting granulation tissue noted.  No erythema or drainage       IMAGING:  No new imaging      IMPRESSION:    Three weeks status post excision, left AV graft remnant.    Patient's medical status continued to deteriorate and now on " home hospice  AFib on Eliquis  Severe systolic dysfunction EF 20%  Metastatic renal cell to lung    PLAN  P.r.n. follow-up    MANPREET Muñoz III, MD, FACS  Professor and Chief, Vascular and Endovascular Surgery

## 2023-10-23 ENCOUNTER — TELEPHONE (OUTPATIENT)
Dept: VASCULAR SURGERY | Facility: CLINIC | Age: 58
End: 2023-10-23
Payer: MEDICARE

## 2023-10-23 NOTE — TELEPHONE ENCOUNTER
Contacted [facility] in response to message. Notified representative that no new fax has been received, confirmed fax number. Representative states documents will be sent again via fax. ----- Message from Esther Richey sent at 10/20/2023  3:29 PM CDT -----  Regarding: Status of fax  Contact: Justina @ [phone] ext [phone]  Justina calling from [employer] is checking on the status of a form that was faxed on 10-. Please fax to :[phone]

## 2023-10-25 ENCOUNTER — DOCUMENT SCAN (OUTPATIENT)
Dept: HOME HEALTH SERVICES | Facility: HOSPITAL | Age: 58
End: 2023-10-25
Payer: MEDICARE

## 2023-10-26 ENCOUNTER — DOCUMENT SCAN (OUTPATIENT)
Dept: HOME HEALTH SERVICES | Facility: HOSPITAL | Age: 58
End: 2023-10-26
Payer: MEDICARE

## 2023-11-01 ENCOUNTER — HOSPITAL ENCOUNTER (INPATIENT)
Facility: HOSPITAL | Age: 58
LOS: 1 days | Discharge: HOME | DRG: 811 | End: 2023-11-02
Attending: EMERGENCY MEDICINE | Admitting: EMERGENCY MEDICINE
Payer: MEDICARE

## 2023-11-01 DIAGNOSIS — N18.6 ESRD (END STAGE RENAL DISEASE): Primary | ICD-10-CM

## 2023-11-01 DIAGNOSIS — K92.2 GASTROINTESTINAL HEMORRHAGE, UNSPECIFIED GASTROINTESTINAL HEMORRHAGE TYPE: ICD-10-CM

## 2023-11-01 DIAGNOSIS — R07.9 CHEST PAIN: ICD-10-CM

## 2023-11-01 DIAGNOSIS — Z99.2 HYPERTENSIVE CKD, ESRD ON DIALYSIS: ICD-10-CM

## 2023-11-01 DIAGNOSIS — D62 ACUTE BLOOD LOSS ANEMIA: ICD-10-CM

## 2023-11-01 DIAGNOSIS — I12.0 HYPERTENSIVE CKD, ESRD ON DIALYSIS: ICD-10-CM

## 2023-11-01 DIAGNOSIS — D64.9 ANEMIA: ICD-10-CM

## 2023-11-01 DIAGNOSIS — N18.6 HYPERTENSIVE CKD, ESRD ON DIALYSIS: ICD-10-CM

## 2023-11-01 PROBLEM — I73.9 PAD (PERIPHERAL ARTERY DISEASE): Status: ACTIVE | Noted: 2023-11-01

## 2023-11-01 LAB
ABO + RH BLD: NORMAL
ALBUMIN SERPL BCP-MCNC: 2 G/DL (ref 3.5–5.2)
ALP SERPL-CCNC: 343 U/L (ref 55–135)
ALT SERPL W/O P-5'-P-CCNC: 30 U/L (ref 10–44)
ANION GAP SERPL CALC-SCNC: 18 MMOL/L (ref 8–16)
ANISOCYTOSIS BLD QL SMEAR: SLIGHT
AST SERPL-CCNC: 44 U/L (ref 10–40)
BASOPHILS # BLD AUTO: 0.13 K/UL (ref 0–0.2)
BASOPHILS # BLD AUTO: 0.18 K/UL (ref 0–0.2)
BASOPHILS NFR BLD: 1.1 % (ref 0–1.9)
BASOPHILS NFR BLD: 1.5 % (ref 0–1.9)
BILIRUB SERPL-MCNC: 0.7 MG/DL (ref 0.1–1)
BLD GP AB SCN CELLS X3 SERPL QL: NORMAL
BLD PROD TYP BPU: NORMAL
BLOOD UNIT EXPIRATION DATE: NORMAL
BLOOD UNIT TYPE CODE: 6200
BLOOD UNIT TYPE: NORMAL
BUN SERPL-MCNC: 63 MG/DL (ref 6–20)
CALCIUM SERPL-MCNC: 8 MG/DL (ref 8.7–10.5)
CHLORIDE SERPL-SCNC: 102 MMOL/L (ref 95–110)
CO2 SERPL-SCNC: 24 MMOL/L (ref 23–29)
CODING SYSTEM: NORMAL
CREAT SERPL-MCNC: 4.5 MG/DL (ref 0.5–1.4)
CROSSMATCH INTERPRETATION: NORMAL
DIFFERENTIAL METHOD: ABNORMAL
DIFFERENTIAL METHOD: ABNORMAL
DISPENSE STATUS: NORMAL
EOSINOPHIL # BLD AUTO: 0.3 K/UL (ref 0–0.5)
EOSINOPHIL # BLD AUTO: 0.4 K/UL (ref 0–0.5)
EOSINOPHIL NFR BLD: 2.3 % (ref 0–8)
EOSINOPHIL NFR BLD: 3 % (ref 0–8)
ERYTHROCYTE [DISTWIDTH] IN BLOOD BY AUTOMATED COUNT: 21.1 % (ref 11.5–14.5)
ERYTHROCYTE [DISTWIDTH] IN BLOOD BY AUTOMATED COUNT: 21.6 % (ref 11.5–14.5)
EST. GFR  (NO RACE VARIABLE): 11 ML/MIN/1.73 M^2
GLUCOSE SERPL-MCNC: 98 MG/DL (ref 70–110)
HCT VFR BLD AUTO: 21 % (ref 37–48.5)
HCT VFR BLD AUTO: 26.3 % (ref 37–48.5)
HGB BLD-MCNC: 5.9 G/DL (ref 12–16)
HGB BLD-MCNC: 7.6 G/DL (ref 12–16)
HYPOCHROMIA BLD QL SMEAR: ABNORMAL
IMM GRANULOCYTES # BLD AUTO: 0.13 K/UL (ref 0–0.04)
IMM GRANULOCYTES # BLD AUTO: 0.16 K/UL (ref 0–0.04)
IMM GRANULOCYTES NFR BLD AUTO: 1 % (ref 0–0.5)
IMM GRANULOCYTES NFR BLD AUTO: 1.5 % (ref 0–0.5)
LYMPHOCYTES # BLD AUTO: 1.9 K/UL (ref 1–4.8)
LYMPHOCYTES # BLD AUTO: 2.1 K/UL (ref 1–4.8)
LYMPHOCYTES NFR BLD: 12.7 % (ref 18–48)
LYMPHOCYTES NFR BLD: 22 % (ref 18–48)
MAGNESIUM SERPL-MCNC: 2.2 MG/DL (ref 1.6–2.6)
MCH RBC QN AUTO: 32.1 PG (ref 27–31)
MCH RBC QN AUTO: 32.2 PG (ref 27–31)
MCHC RBC AUTO-ENTMCNC: 28.1 G/DL (ref 32–36)
MCHC RBC AUTO-ENTMCNC: 28.9 G/DL (ref 32–36)
MCV RBC AUTO: 111 FL (ref 82–98)
MCV RBC AUTO: 115 FL (ref 82–98)
MONOCYTES # BLD AUTO: 1.4 K/UL (ref 0.3–1)
MONOCYTES # BLD AUTO: 1.7 K/UL (ref 0.3–1)
MONOCYTES NFR BLD: 10.1 % (ref 4–15)
MONOCYTES NFR BLD: 16.5 % (ref 4–15)
NEUTROPHILS # BLD AUTO: 12.3 K/UL (ref 1.8–7.7)
NEUTROPHILS # BLD AUTO: 4.7 K/UL (ref 1.8–7.7)
NEUTROPHILS NFR BLD: 55.5 % (ref 38–73)
NEUTROPHILS NFR BLD: 72.8 % (ref 38–73)
NRBC BLD-RTO: 1 /100 WBC
NRBC BLD-RTO: 1 /100 WBC
PLATELET # BLD AUTO: 254 K/UL (ref 150–450)
PLATELET # BLD AUTO: 254 K/UL (ref 150–450)
PLATELET BLD QL SMEAR: ABNORMAL
PMV BLD AUTO: 10.8 FL (ref 9.2–12.9)
PMV BLD AUTO: 11.1 FL (ref 9.2–12.9)
POIKILOCYTOSIS BLD QL SMEAR: SLIGHT
POLYCHROMASIA BLD QL SMEAR: ABNORMAL
POTASSIUM SERPL-SCNC: 4.4 MMOL/L (ref 3.5–5.1)
PROT SERPL-MCNC: 5.8 G/DL (ref 6–8.4)
RBC # BLD AUTO: 1.83 M/UL (ref 4–5.4)
RBC # BLD AUTO: 2.37 M/UL (ref 4–5.4)
SCHISTOCYTES BLD QL SMEAR: ABNORMAL
SODIUM SERPL-SCNC: 144 MMOL/L (ref 136–145)
SPECIMEN OUTDATE: NORMAL
TRANS ERYTHROCYTES VOL PATIENT: NORMAL ML
WBC # BLD AUTO: 16.8 K/UL (ref 3.9–12.7)
WBC # BLD AUTO: 8.4 K/UL (ref 3.9–12.7)

## 2023-11-01 PROCEDURE — 27201040 HC RC 50 FILTER: Performed by: EMERGENCY MEDICINE

## 2023-11-01 PROCEDURE — 99285 EMERGENCY DEPT VISIT HI MDM: CPT | Mod: 25

## 2023-11-01 PROCEDURE — 85025 COMPLETE CBC W/AUTO DIFF WBC: CPT

## 2023-11-01 PROCEDURE — 63600175 PHARM REV CODE 636 W HCPCS: Performed by: EMERGENCY MEDICINE

## 2023-11-01 PROCEDURE — 36415 COLL VENOUS BLD VENIPUNCTURE: CPT | Performed by: STUDENT IN AN ORGANIZED HEALTH CARE EDUCATION/TRAINING PROGRAM

## 2023-11-01 PROCEDURE — 86920 COMPATIBILITY TEST SPIN: CPT | Performed by: EMERGENCY MEDICINE

## 2023-11-01 PROCEDURE — C9113 INJ PANTOPRAZOLE SODIUM, VIA: HCPCS | Performed by: EMERGENCY MEDICINE

## 2023-11-01 PROCEDURE — 93010 EKG 12-LEAD: ICD-10-PCS | Mod: ,,, | Performed by: INTERNAL MEDICINE

## 2023-11-01 PROCEDURE — 85025 COMPLETE CBC W/AUTO DIFF WBC: CPT | Mod: 91 | Performed by: STUDENT IN AN ORGANIZED HEALTH CARE EDUCATION/TRAINING PROGRAM

## 2023-11-01 PROCEDURE — 93005 ELECTROCARDIOGRAM TRACING: CPT

## 2023-11-01 PROCEDURE — 11000001 HC ACUTE MED/SURG PRIVATE ROOM

## 2023-11-01 PROCEDURE — 93010 ELECTROCARDIOGRAM REPORT: CPT | Mod: ,,, | Performed by: INTERNAL MEDICINE

## 2023-11-01 PROCEDURE — P9021 RED BLOOD CELLS UNIT: HCPCS | Performed by: EMERGENCY MEDICINE

## 2023-11-01 PROCEDURE — 25000003 PHARM REV CODE 250: Performed by: STUDENT IN AN ORGANIZED HEALTH CARE EDUCATION/TRAINING PROGRAM

## 2023-11-01 PROCEDURE — 83735 ASSAY OF MAGNESIUM: CPT | Performed by: EMERGENCY MEDICINE

## 2023-11-01 PROCEDURE — 86901 BLOOD TYPING SEROLOGIC RH(D): CPT | Performed by: EMERGENCY MEDICINE

## 2023-11-01 PROCEDURE — 80053 COMPREHEN METABOLIC PANEL: CPT

## 2023-11-01 RX ORDER — SODIUM CHLORIDE 0.9 % (FLUSH) 0.9 %
10 SYRINGE (ML) INJECTION
Status: DISCONTINUED | OUTPATIENT
Start: 2023-11-01 | End: 2023-11-02 | Stop reason: HOSPADM

## 2023-11-01 RX ORDER — GLUCAGON 1 MG
1 KIT INJECTION
Status: DISCONTINUED | OUTPATIENT
Start: 2023-11-01 | End: 2023-11-02 | Stop reason: HOSPADM

## 2023-11-01 RX ORDER — DOXYCYCLINE HYCLATE 100 MG
100 TABLET ORAL EVERY 12 HOURS
Status: DISCONTINUED | OUTPATIENT
Start: 2023-11-01 | End: 2023-11-02 | Stop reason: HOSPADM

## 2023-11-01 RX ORDER — PANTOPRAZOLE SODIUM 40 MG/10ML
40 INJECTION, POWDER, LYOPHILIZED, FOR SOLUTION INTRAVENOUS 2 TIMES DAILY
Status: DISCONTINUED | OUTPATIENT
Start: 2023-11-02 | End: 2023-11-02 | Stop reason: HOSPADM

## 2023-11-01 RX ORDER — IBUPROFEN 200 MG
16 TABLET ORAL
Status: DISCONTINUED | OUTPATIENT
Start: 2023-11-01 | End: 2023-11-02 | Stop reason: HOSPADM

## 2023-11-01 RX ORDER — NALOXONE HCL 0.4 MG/ML
0.02 VIAL (ML) INJECTION
Status: DISCONTINUED | OUTPATIENT
Start: 2023-11-01 | End: 2023-11-02 | Stop reason: HOSPADM

## 2023-11-01 RX ORDER — PANTOPRAZOLE SODIUM 40 MG/10ML
80 INJECTION, POWDER, LYOPHILIZED, FOR SOLUTION INTRAVENOUS
Status: COMPLETED | OUTPATIENT
Start: 2023-11-01 | End: 2023-11-01

## 2023-11-01 RX ORDER — HYDROCODONE BITARTRATE AND ACETAMINOPHEN 10; 325 MG/1; MG/1
1 TABLET ORAL EVERY 6 HOURS PRN
Status: DISCONTINUED | OUTPATIENT
Start: 2023-11-01 | End: 2023-11-02 | Stop reason: HOSPADM

## 2023-11-01 RX ORDER — ACETAMINOPHEN 325 MG/1
650 TABLET ORAL EVERY 8 HOURS PRN
Status: DISCONTINUED | OUTPATIENT
Start: 2023-11-01 | End: 2023-11-02 | Stop reason: HOSPADM

## 2023-11-01 RX ORDER — HYDROCODONE BITARTRATE AND ACETAMINOPHEN 500; 5 MG/1; MG/1
TABLET ORAL
Status: DISCONTINUED | OUTPATIENT
Start: 2023-11-01 | End: 2023-11-02 | Stop reason: HOSPADM

## 2023-11-01 RX ORDER — ONDANSETRON 8 MG/1
8 TABLET, ORALLY DISINTEGRATING ORAL EVERY 8 HOURS PRN
Status: DISCONTINUED | OUTPATIENT
Start: 2023-11-01 | End: 2023-11-02 | Stop reason: HOSPADM

## 2023-11-01 RX ORDER — ACETAMINOPHEN 325 MG/1
650 TABLET ORAL EVERY 4 HOURS PRN
Status: DISCONTINUED | OUTPATIENT
Start: 2023-11-01 | End: 2023-11-02 | Stop reason: HOSPADM

## 2023-11-01 RX ORDER — HYDROCODONE BITARTRATE AND ACETAMINOPHEN 5; 325 MG/1; MG/1
1 TABLET ORAL EVERY 6 HOURS PRN
Status: DISCONTINUED | OUTPATIENT
Start: 2023-11-01 | End: 2023-11-02 | Stop reason: HOSPADM

## 2023-11-01 RX ORDER — IBUPROFEN 200 MG
24 TABLET ORAL
Status: DISCONTINUED | OUTPATIENT
Start: 2023-11-01 | End: 2023-11-02 | Stop reason: HOSPADM

## 2023-11-01 RX ADMIN — DOXYCYCLINE HYCLATE 100 MG: 100 TABLET, FILM COATED ORAL at 09:11

## 2023-11-01 RX ADMIN — PANTOPRAZOLE SODIUM 80 MG: 40 INJECTION, POWDER, FOR SOLUTION INTRAVENOUS at 06:11

## 2023-11-01 NOTE — FIRST PROVIDER EVALUATION
Medical screening examination initiated.  I have conducted a focused provider triage encounter, findings are as follows:    Brief history of present illness:  58-year-old female PMH of dialysis tues/thurs/sat. Went yesterday. Told today hemoglobin 5.6    There were no vitals filed for this visit.    Pertinent physical exam:  lung sounds clear bilaterally, pulses 2+ radial.     Brief workup plan:  workup abnormal labs, possible transfusion. Up to next provider    Preliminary workup initiated; this workup will be continued and followed by the physician or advanced practice provider that is assigned to the patient when roomed.

## 2023-11-01 NOTE — ASSESSMENT & PLAN NOTE
Advance Care Planning     Date: 11/01/2023  Reviewed previous records. [facility]. Continues on dialysis at this time. Discussed with  and wife at bedside who confirm DNR status. 5 minutes spent in ACP conversation. Palliative care consulted.

## 2023-11-01 NOTE — Clinical Note
Diagnosis: Anemia [227254]   Admitting Provider:: MANUEL YUN [[id number]]   Reason for IP Medical Treatment  (Clinical interventions that can only be accomplished in the IP setting? ) :: further close monitoring and specialty medical care   I certify that Inpatient services for greater than or equal to 2 midnights are medically necessary:: Yes   Plans for Post-Acute care--if anticipated (pick the single best option):: A. No post acute care anticipated at this time   Special Needs:: No Special Needs [1]

## 2023-11-01 NOTE — ED PROVIDER NOTES
Encounter Date: 11/1/2023    SCRIBE #1 NOTE: I, David Chavez, am scribing for, and in the presence of,  Hudson Ramos MD.       History     Chief Complaint   Patient presents with    Abnormal Lab     Sent by Dialysis for Hgb 5.6, currently on hospice, tachpneic     Mrs. Bloom comes to the ED after she received a phone call today that informed her of a hemoglobin level of 5.6 from labs drawn yesterday. Labs drawn 1 week ago indicate a prior hemoglobin of 9.5. She is on Tu/Thu/Sat hemodialysis with a full dialysis yesterday. Endorses mild shortness of breath. Denies vomiting. She is on hospice care and has been taken off a few meds but endorses compliance with Eliquis, Entresto, and 100 mg Doxycycline for a toe wound. PMHx is significant for Anemia, A-fib, CHF, Myocardiopathy, ESRD, HTN, metastatic RCC.      The history is provided by the patient and the spouse.     Review of patient's allergies indicates:   Allergen Reactions    Coreg [carvedilol] Other (See Comments)     Nausea/vomiting    Allopurinol      Other reaction(s): abnormal transaminases     Past Medical History:   Diagnosis Date    Anemia     Anticoagulant long-term use     Atrial fibrillation     Bronchitis 03/01/2017    Cancer 2016    kidney cancer    CHF (congestive heart failure), NYHA class II, chronic, systolic     CMV (cytomegalovirus) antibody positive     Encounter for blood transfusion     ESRD (end stage renal disease)     Essential hypertension 09/23/2015    H/O herpes simplex type 2 infection     Herpes simplex type 1 antibody positive     History of kidney cancer     s/p left nephrectomy 1/2016    Hyperparathyroidism, unspecified     Hyperuricemia without signs of inflammatory arthritis and tophaceous disease     Kidney stones     LGSIL (low grade squamous intraepithelial dysplasia)     Myocardiopathy 07/21/2017    Prediabetes     Proteinuria     Renal disorder     Thyroid nodule     Urate nephropathy      Past Surgical History:    Procedure Laterality Date    BREAST CYST EXCISION      COLONOSCOPY N/A 11/12/2015    COLONOSCOPY N/A 3/12/2021    Procedure: COLONOSCOPY;  Surgeon: Brendon Lanier MD;  Location: [facility] ENDO;  Service: Endoscopy;  Laterality: N/A;  covid test 3/9, labs prior, prep instr mailed -ml    EXCISION OF ARTERIOVENOUS FISTULA Left 9/27/2023    Procedure: EXCISION, AV FISTULA;  Surgeon: FARIDEH Muñoz III, MD;  [facility];  Service: Vascular;  Laterality: Left;  LUE AV graft excision    INSERTION OF BIVENTRICULAR IMPLANTABLE CARDIOVERTER-DEFIBRILLATOR (ICD)  04/2021    INSERTION OF TUNNELED CENTRAL VENOUS CATHETER (CVC) WITH SUBCUTANEOUS PORT N/A 1/25/2023    Procedure: INSERTION, DUAL LUMEN CATHETER WITH PORT, WITH IMAGING GUIDANCE;  Surgeon: FARIDEH Muñoz III, MD;  [facility];  Service: Peripheral Vascular;  Laterality: N/A;  possinble permcath placment    NEPHRECTOMY-LAPAROSCOPIC Left 01/12/2016    PERCUTANEOUS TRANSLUMINAL ANGIOPLASTY OF ARTERIOVENOUS FISTULA Left 4/19/2023    Procedure: PTA, AV FISTULA;  Surgeon: FARIDEH Muñoz III, MD;  Location: [facility] CATH LAB;  Service: Peripheral Vascular;  Laterality: Left;    PERITONEAL CATHETER INSERTION      Permacath insertion  01/12/2017    PLACEMENT OF ARTERIOVENOUS GRAFT  12/28/2022    Procedure: INSERTION, GRAFT, ARTERIOVENOUS;  Surgeon: FARIDEH Muñoz III, MD;  [facility];  Service: Peripheral Vascular;;    REMOVAL, GRAFT, ARTERIOVENOUS, UPPER EXTREMITY Left 1/25/2023    Procedure: REMOVAL, GRAFT, ARTERIOVENOUS, UPPER EXTREMITY;  Surgeon: FARIDEH Muñoz III, MD;  Location: [facility] OR [facility] FLR;  Service: Peripheral Vascular;  Laterality: Left;    REVISION OF ARTERIOVENOUS FISTULA Left 5/1/2023    Procedure: REVISION, AV FISTULA;  Surgeon: FARIDEH Muñoz III, MD;  Location: [facility] OR [facility] FLR;  Service: Peripheral Vascular;  Laterality: Left;  LUE AVG revision    REVISION OF PROCEDURE INVOLVING ARTERIOVENOUS GRAFT Left  12/28/2022    Procedure: REVISION, PROCEDURE INVOLVING ARTERIOVENOUS GRAFT;  Surgeon: FARIDEH Muñoz III, MD;  [facility];  Service: Peripheral Vascular;  Laterality: Left;    REVISION OF PROCEDURE INVOLVING ARTERIOVENOUS GRAFT Left 7/21/2023    Procedure: LEFT ARM ARTERIOVENOUS GRAFT EXCISION  AND LIGATION;  Surgeon: Obi Werner MD;  Location: [facility] OR;  Service: Vascular;  Laterality: Left;  Left AVG excision and revision with interposition    SALPINGOOPHORECTOMY Right 2016    KJB---DAVINCI    TONSILLECTOMY      TUBAL LIGATION       Family History   Problem Relation Age of Onset    Hypertension Mother     Diabetes Father     Kidney disease Father     No Known Problems Sister     Heart disease Sister     No Known Problems Sister     No Known Problems Brother     No Known Problems Brother     Cataracts Maternal Aunt     No Known Problems Maternal Uncle     No Known Problems Paternal Aunt     No Known Problems Paternal Uncle     No Known Problems Maternal Grandmother     Diabetes Maternal Grandfather     No Known Problems Paternal Grandmother     No Known Problems Paternal Grandfather     No Known Problems Son     No Known Problems Son     No Known Problems Other     Breast cancer Neg Hx     Colon cancer Neg Hx     Cancer Neg Hx     Stroke Neg Hx     Amblyopia Neg Hx     Blindness Neg Hx     Glaucoma Neg Hx     Macular degeneration Neg Hx     Retinal detachment Neg Hx     Strabismus Neg Hx     Thyroid disease Neg Hx      Social History     Tobacco Use    Smoking status: Never     Passive exposure: Never    Smokeless tobacco: Never   Substance Use Topics    Alcohol use: No    Drug use: Yes     Types: Hydrocodone     Review of Systems   Constitutional:  Positive for fatigue.   Respiratory:  Positive for shortness of breath.         Mild   Gastrointestinal:  Negative for vomiting.   Hematological:         Positive for abnormal lab result.   All other systems reviewed and are  negative.      Physical Exam     Initial Vitals [11/01/23 1407]   BP Pulse Resp Temp SpO2   118/76 86 (!) 28 97.4 °F (36.3 °C) 100 %      MAP       --         Physical Exam    Nursing note and vitals reviewed.  Constitutional: She is not diaphoretic. No distress.   She is frail, pale, and thin.   HENT:   Head: Normocephalic and atraumatic.   Eyes: Conjunctivae and EOM are normal. No scleral icterus.   Neck: Neck supple.   Normal range of motion.  Cardiovascular:  Normal rate and regular rhythm.           There is a 2/6 systolic ejection murmur.  Good perfusion.   Pulmonary/Chest: Breath sounds normal. No respiratory distress.   There is dry dressing over catheter in the left anterior chest wall.   Abdominal: Abdomen is soft. There is no abdominal tenderness.   Genitourinary:    Genitourinary Comments: Heme positive brown stool on gloved finger, chaperone RN present. Normal tone. No gross blood.      Musculoskeletal:         General: No tenderness. Normal range of motion.      Cervical back: Normal range of motion and neck supple.      Comments: There is 1-2+ bilateral ankle edema.     Neurological: She is alert and oriented to person, place, and time.   Lucid and linear  Answering simple questions appropriately.   Skin: Skin is warm and dry.   There is a stage 1 ulcer to the left heel.  There is a healing ulcer on the left 3rd toe without drainage.     Psychiatric: She has a normal mood and affect. Thought content normal.         ED Course   Critical Care    Date/Time: 11/2/2023 1:15 PM    Performed by: Hudson Ramos MD  Authorized by: Gustavo Cartagena III, MD  Direct patient critical care time: 10 minutes  Additional history critical care time: 5 minutes  Ordering / reviewing critical care time: 5 minutes  Documentation critical care time: 5 minutes  Consulting other physicians critical care time: 5 minutes  Total critical care time (exclusive of procedural time) : 30 minutes        Labs Reviewed   CBC W/  AUTO DIFFERENTIAL - Abnormal; Notable for the following components:       Result Value    RBC 1.83 (*)     Hemoglobin 5.9 (*)     Hematocrit 21.0 (*)      (*)     MCH 32.2 (*)     MCHC 28.1 (*)     RDW 21.6 (*)     Immature Granulocytes 1.5 (*)     Immature Grans (Abs) 0.13 (*)     Mono # 1.4 (*)     nRBC 1 (*)     Mono % 16.5 (*)     All other components within normal limits    Narrative:       HGB  critical result(s) called and verbal readback obtained from   René Whitten  by JCT3 11/01/2023 15:16   COMPREHENSIVE METABOLIC PANEL - Abnormal; Notable for the following components:    BUN 63 (*)     Creatinine 4.5 (*)     Calcium 8.0 (*)     Total Protein 5.8 (*)     Albumin 2.0 (*)     Alkaline Phosphatase 343 (*)     AST 44 (*)     eGFR 11 (*)     Anion Gap 18 (*)     All other components within normal limits   MAGNESIUM   URINALYSIS, REFLEX TO URINE CULTURE   POCT GLUCOSE, HAND-HELD DEVICE   TYPE & SCREEN   PREPARE RBC SOFT     EKG Readings: (Independently Interpreted)   Initial Reading: No STEMI. Rhythm: Normal Sinus Rhythm. Ectopy: Rare. Conduction: Normal. Other Findings: Prolonged QT Interval.       Imaging Results              X-Ray Chest AP Portable (Final result)  Result time 11/01/23 15:11:47      Final result by Gael Hare MD (11/01/23 15:11:47)                   Impression:      Cardiomegaly with mild nonspecific interstitial changes.  Mild edema is a consideration.  Follow-up recommended.      Electronically signed by: Gael Hare  Date:    11/01/2023  Time:    15:11               Narrative:    EXAMINATION:  XR CHEST AP PORTABLE    CLINICAL HISTORY:  Anemia, unspecified    TECHNIQUE:  Single frontal view of the chest was performed.    COMPARISON:  10/10/2023    FINDINGS:  Cardiac silhouette is enlarged similar to the prior study.  The left central venous catheter with tip overlying the right atrium, similar to the prior study.    Cardiac defibrillator device on the left which obscures  the left costophrenic angle.  Mild diffuse nonspecific interstitial changes.  Mild edema is a consideration.  No mass or consolidation.    No acute osseous abnormality.                                       Medications   0.9%  NaCl infusion (for blood administration) (has no administration in time range)   pantoprazole injection 40 mg (40 mg Intravenous Given 11/2/23 0926)   sodium chloride 0.9% flush 10 mL (has no administration in time range)   acetaminophen tablet 650 mg (has no administration in time range)   acetaminophen tablet 650 mg (650 mg Oral Given 11/2/23 1215)   naloxone 0.4 mg/mL injection 0.02 mg (has no administration in time range)   glucose chewable tablet 16 g (has no administration in time range)   glucose chewable tablet 24 g (has no administration in time range)   glucagon (human recombinant) injection 1 mg (has no administration in time range)   HYDROcodone-acetaminophen 5-325 mg per tablet 1 tablet (0 tablets Oral Return to Cabinet 11/1/23 2033)   HYDROcodone-acetaminophen  mg per tablet 1 tablet (has no administration in time range)   ondansetron disintegrating tablet 8 mg (8 mg Oral Given 11/2/23 0356)   dextrose 10% bolus 125 mL 125 mL (0 mLs Intravenous Stopped 11/2/23 0605)   dextrose 10% bolus 250 mL 250 mL (has no administration in time range)   doxycycline tablet 100 mg (100 mg Oral Given 11/2/23 0926)   epoetin david injection 10,000 Units (10,000 Units Subcutaneous Given 11/2/23 0925)   mupirocin 2 % ointment ( Nasal Given 11/2/23 0933)   sodium chloride 0.9% bolus 250 mL 250 mL (has no administration in time range)   pantoprazole injection 80 mg (80 mg Intravenous Given 11/1/23 1845)     Medical Decision Making  Ms. Bloom has been stable during her time in the ER. Prior records reviewed. Pt is with heart of hospice,  reports she is getting full aggressive medical care when needed, related to routine daily meds, transfusions if needed.   Found to have Hb 5 on repeat  labs in ED. Transfusion consented by , pt present and lucid, agrees.   1unit prbc ordered. Protonix ordered.   Discussed w Dr Bradley sharma GI, will see pt upon admission.   Given multiple medical problems, on eliquis, GI bleed, anemia, will admit pt to hospital for further close monitoring and specialty medical care. Have discussed w Dr. Cartagena who will see pt in ER. Family updated on plan.     Amount and/or Complexity of Data Reviewed  Labs: ordered. Decision-making details documented in ED Course.  Radiology: ordered. Decision-making details documented in ED Course.  ECG/medicine tests: ordered. Decision-making details documented in ED Course.    Risk  Prescription drug management.  Decision regarding hospitalization.                               Clinical Impression:   Final diagnoses:  [D64.9] Anemia  [N18.6] ESRD (end stage renal disease) (Primary)  [K92.2] Gastrointestinal hemorrhage, unspecified gastrointestinal hemorrhage type        ED Disposition Condition    Admit Stable                    Hudson Ramos MD  11/02/23 4010

## 2023-11-01 NOTE — SUBJECTIVE & OBJECTIVE
Past Medical History:   Diagnosis Date    Anemia     Anticoagulant long-term use     Atrial fibrillation     Bronchitis 03/01/2017    Cancer 2016    kidney cancer    CHF (congestive heart failure), NYHA class II, chronic, systolic     CMV (cytomegalovirus) antibody positive     Encounter for blood transfusion     ESRD (end stage renal disease)     Essential hypertension 09/23/2015    H/O herpes simplex type 2 infection     Herpes simplex type 1 antibody positive     History of kidney cancer     s/p left nephrectomy 1/2016    Hyperparathyroidism, unspecified     Hyperuricemia without signs of inflammatory arthritis and tophaceous disease     Kidney stones     LGSIL (low grade squamous intraepithelial dysplasia)     Myocardiopathy 07/21/2017    Prediabetes     Proteinuria     Renal disorder     Thyroid nodule     Urate nephropathy        Past Surgical History:   Procedure Laterality Date    BREAST CYST EXCISION      COLONOSCOPY N/A 11/12/2015    COLONOSCOPY N/A 3/12/2021    Procedure: COLONOSCOPY;  Surgeon: Brendon Lanier MD;  Location: [facility];  Service: Endoscopy;  Laterality: N/A;  covid test 3/9, labs prior, prep instr mailed -ml    EXCISION OF ARTERIOVENOUS FISTULA Left 9/27/2023    Procedure: EXCISION, AV FISTULA;  Surgeon: FARIDEH Muñoz III, MD;  Location: [facility] OR [address] [place] [address];  Service: Vascular;  Laterality: Left;  LUE AV graft excision    INSERTION OF BIVENTRICULAR IMPLANTABLE CARDIOVERTER-DEFIBRILLATOR (ICD)  04/2021    INSERTION OF TUNNELED CENTRAL VENOUS CATHETER (CVC) WITH SUBCUTANEOUS PORT N/A 1/25/2023    Procedure: INSERTION, DUAL LUMEN CATHETER WITH PORT, WITH IMAGING GUIDANCE;  Surgeon: FARIDEH Muñoz III, MD;  [facility];  Service: Peripheral Vascular;  Laterality: N/A;  possinble permcath placment    NEPHRECTOMY-LAPAROSCOPIC Left 01/12/2016    PERCUTANEOUS TRANSLUMINAL ANGIOPLASTY OF ARTERIOVENOUS FISTULA Left 4/19/2023    Procedure: PTA, AV FISTULA;  Surgeon: FARIDEH Muñoz  III, MD;  Location: [facility] CATH LAB;  Service: Peripheral Vascular;  Laterality: Left;    PERITONEAL CATHETER INSERTION      Permacath insertion  01/12/2017    PLACEMENT OF ARTERIOVENOUS GRAFT  12/28/2022    Procedure: INSERTION, GRAFT, ARTERIOVENOUS;  Surgeon: FARIDEH Muñoz III, MD;  [facility];  Service: Peripheral Vascular;;    REMOVAL, GRAFT, ARTERIOVENOUS, UPPER EXTREMITY Left 1/25/2023    Procedure: REMOVAL, GRAFT, ARTERIOVENOUS, UPPER EXTREMITY;  Surgeon: FARIDEH Muñoz III, MD;  [facility];  Service: Peripheral Vascular;  Laterality: Left;    REVISION OF ARTERIOVENOUS FISTULA Left 5/1/2023    Procedure: REVISION, AV FISTULA;  Surgeon: FARIDEH Muñoz III, MD;  [facility];  Service: Peripheral Vascular;  Laterality: Left;  LUE AVG revision    REVISION OF PROCEDURE INVOLVING ARTERIOVENOUS GRAFT Left 12/28/2022    Procedure: REVISION, PROCEDURE INVOLVING ARTERIOVENOUS GRAFT;  Surgeon: FARIDEH Muñoz III, MD;  [facility];  Service: Peripheral Vascular;  Laterality: Left;    REVISION OF PROCEDURE INVOLVING ARTERIOVENOUS GRAFT Left 7/21/2023    Procedure: LEFT ARM ARTERIOVENOUS GRAFT EXCISION  AND LIGATION;  Surgeon: Obi Werner MD;  Location: [facility] OR;  Service: Vascular;  Laterality: Left;  Left AVG excision and revision with interposition    SALPINGOOPHORECTOMY Right 2016    KJB---DAVINCI    TONSILLECTOMY      TUBAL LIGATION         Review of patient's allergies indicates:   Allergen Reactions    Coreg [carvedilol] Other (See Comments)     Nausea/vomiting    Allopurinol      Other reaction(s): abnormal transaminases       Current Facility-Administered Medications on File Prior to Encounter   Medication    0.9%  NaCl infusion     Current Outpatient Medications on File Prior to Encounter   Medication Sig    acetaminophen (TYLENOL) 500 MG tablet Take 500 mg by mouth every 6 (six) hours as needed for Pain.    apixaban (ELIQUIS) 2.5 mg Tab  Take 1 tablet (2.5 mg total) by mouth 2 (two) times daily.    aspirin 81 MG Chew Take 1 tablet (81 mg total) by mouth once daily.    cabozantinib (CABOMETYX) 60 mg Tab TAKE ONE TABLET BY MOUTH ONCE DAILY AT THE SAME TIME ON AN EMPTY STOMACH AT LEAST 1 HOUR BEFORE OR 2 HOURS AFTER EATING. AVOID GRAPEFRUIT PRODUCTS    cyclobenzaprine (FLEXERIL) 5 MG tablet TAKE 1 TABLET BY MOUTH DAILY AS NEEDED FOR MUSCLE SPASMS.    ENTRESTO 49-51 mg per tablet TAKE 1 TABLET BY MOUTH TWICE A DAY    epoetin david-epbx (RETACRIT INJ) Epoetin david - epbx (Retacrit)    fluticasone propionate (FLONASE) 50 mcg/actuation nasal spray 1 spray (50 mcg total) by Each Nostril route 2 (two) times daily as needed (ear fullness).    HYDROcodone-acetaminophen (NORCO) 5-325 mg per tablet Take 1 tablet by mouth every 6 (six) hours as needed for Pain.    lanthanum (FOSRENOL) 1000 MG chewable tablet Take 1 tablet by mouth.    levothyroxine (SYNTHROID) 75 MCG tablet Take 1 tablet (75 mcg total) by mouth once daily.    metoprolol succinate (TOPROL-XL) 100 MG 24 hr tablet Take 1/2 tablet (50mg) once daily    mv,Ca,min-folic acid-vit K1 (ONE-A-DAY WOMEN'S 50 PLUS) 400-20 mcg Tab Take 1 tablet by mouth once daily.    naloxone (NARCAN) 1 mg/mL injection 2 mg (1 mg per nostril) by Nasal route as needed for opioid overdose; may repeat in 3 to 5 minutes if not effective. Call 911    cloNIDine 0.3 mg/24 hr td ptwk (CATAPRES) 0.3 mg/24 hr Place 1 patch onto the skin every 7 days.    hydrALAZINE (APRESOLINE) 100 MG tablet Take 1 tablet (100 mg total) by mouth every 12 (twelve) hours.    lidocaine-prilocaine (EMLA) cream APPLY ATLEAST 30 MINUTES BEFORE TREATMENT 3 TIMES A WEEK    [DISCONTINUED] amLODIPine (NORVASC) 5 MG tablet TAKE 1 TABLET BY MOUTH EVERY DAY     Family History       Problem Relation (Age of Onset)    Cataracts Maternal Aunt    Diabetes Father, Maternal Grandfather    Heart disease Sister    Hypertension Mother    Kidney disease Father    No Known  Problems Sister, Sister, Brother, Brother, Maternal Uncle, Paternal Aunt, Paternal Uncle, Maternal Grandmother, Paternal Grandmother, Paternal Grandfather, Son, Son, Other          Tobacco Use    Smoking status: Never     Passive exposure: Never    Smokeless tobacco: Never   Substance and Sexual Activity    Alcohol use: No    Drug use: Yes     Types: Hydrocodone    Sexual activity: Not Currently     Review of Systems  Objective:     Vital Signs (Most Recent):  Temp: 97.5 °F (36.4 °C) (11/01/23 1647)  Pulse: 77 (11/01/23 1647)  Resp: 20 (11/01/23 1647)  BP: 116/74 (11/01/23 1647)  SpO2: 99 % (11/01/23 1647) Vital Signs (24h Range):  Temp:  [97.4 °F (36.3 °C)-97.5 °F (36.4 °C)] 97.5 °F (36.4 °C)  Pulse:  [77-86] 77  Resp:  [20-28] 20  SpO2:  [99 %-100 %] 99 %  BP: (116-118)/(72-76) 116/74     Weight: 38.6 kg (85 lb)  Body mass index is 14.59 kg/m².     Physical Exam  Constitutional:       General: She is not in acute distress.     Appearance: She is ill-appearing (chronic).   HENT:      Head: Normocephalic and atraumatic.      Mouth/Throat:      Mouth: Mucous membranes are moist.      Pharynx: Oropharynx is clear.   Eyes:      Extraocular Movements: Extraocular movements intact.   Cardiovascular:      Rate and Rhythm: Normal rate and regular rhythm.   Abdominal:      General: There is no distension.      Palpations: Abdomen is soft.      Tenderness: There is no abdominal tenderness. There is no guarding or rebound.   Musculoskeletal:         General: No swelling or tenderness.      Cervical back: Neck supple. No rigidity.   Skin:     General: Skin is warm and dry.   Neurological:      General: No focal deficit present.      Mental Status: She is alert and oriented to person, place, and time.   Psychiatric:         Mood and Affect: Mood normal.         Behavior: Behavior normal.                Significant Labs: All pertinent labs within the past 24 hours have been reviewed.    Significant Imaging: I have reviewed all  pertinent imaging results/findings within the past 24 hours.

## 2023-11-01 NOTE — ASSESSMENT & PLAN NOTE
Creatine stable for now. BMP reviewed- noted Estimated Creatinine Clearance: 8.2 mL/min (A) (based on SCr of 4.5 mg/dL (H)). according to latest data. Based on current GFR, CKD stage is end stage.  Monitor UOP and serial BMP and adjust therapy as needed. Renally dose meds. Avoid nephrotoxic medications and procedures.  -nephrology consulted

## 2023-11-01 NOTE — H&P
[facility]  [facility]  History & Physical    Patient Name: Eva Bloom  MRN: [id number]  Patient Class: IP- Inpatient  Admission Date: 11/1/2023  Attending Physician: Gustavo Cartagena III, MD   Primary Care Provider: Sowmya Mccain MD         Patient information was obtained from patient, spouse/SO and ER records.     Subjective:     Principal Problem:Acute blood loss anemia    Chief Complaint:   Chief Complaint   Patient presents with    Abnormal Lab     Sent by Dialysis for Hgb 5.6, currently on hospice, tachpneic        HPI: 58F with pmh of esrd, afib on eliquis, rcc with metastatic disease to lung and pancreas, cardiomyopathy, htn who presented due to abnormal labs. Pt was called by outpatient dialysis center due to reported hgb of 5.6 with previous of 9.5 from the week prior. Pt had labs done at dialysis on 10/31 which she completed a full session. Pt denies hematochezia, melena, but does endorse generalized fatigue. Pt states if she had not been called she would not have come to the hospital. Pt is DNR and currently enrolled in hospice, but continues dialysis treatments. Pt denies alcohol, tobacco, or drug use. In ed hgb noted to be 5.9 and hemoccult positive. Pt given protonix and 1 unit prbc ordered prior to admission with GI and nephrology consultation.      Past Medical History:   Diagnosis Date    Anemia     Anticoagulant long-term use     Atrial fibrillation     Bronchitis 03/01/2017    Cancer 2016    kidney cancer    CHF (congestive heart failure), NYHA class II, chronic, systolic     CMV (cytomegalovirus) antibody positive     Encounter for blood transfusion     ESRD (end stage renal disease)     Essential hypertension 09/23/2015    H/O herpes simplex type 2 infection     Herpes simplex type 1 antibody positive     History of kidney cancer     s/p left nephrectomy 1/2016    Hyperparathyroidism, unspecified     Hyperuricemia without signs of inflammatory  arthritis and tophaceous disease     Kidney stones     LGSIL (low grade squamous intraepithelial dysplasia)     Myocardiopathy 07/21/2017    Prediabetes     Proteinuria     Renal disorder     Thyroid nodule     Urate nephropathy        Past Surgical History:   Procedure Laterality Date    BREAST CYST EXCISION      COLONOSCOPY N/A 11/12/2015    COLONOSCOPY N/A 3/12/2021    Procedure: COLONOSCOPY;  Surgeon: Brendon Lanier MD;  Location: [facility] ENDO;  Service: Endoscopy;  Laterality: N/A;  covid test 3/9, labs prior, prep instr mailed -ml    EXCISION OF ARTERIOVENOUS FISTULA Left 9/27/2023    Procedure: EXCISION, AV FISTULA;  Surgeon: FARIDEH Muñoz III, MD;  [facility];  Service: Vascular;  Laterality: Left;  LUE AV graft excision    INSERTION OF BIVENTRICULAR IMPLANTABLE CARDIOVERTER-DEFIBRILLATOR (ICD)  04/2021    INSERTION OF TUNNELED CENTRAL VENOUS CATHETER (CVC) WITH SUBCUTANEOUS PORT N/A 1/25/2023    Procedure: INSERTION, DUAL LUMEN CATHETER WITH PORT, WITH IMAGING GUIDANCE;  Surgeon: FARIDEH Muñoz III, MD;  [facility];  Service: Peripheral Vascular;  Laterality: N/A;  possinble permcath placment    NEPHRECTOMY-LAPAROSCOPIC Left 01/12/2016    PERCUTANEOUS TRANSLUMINAL ANGIOPLASTY OF ARTERIOVENOUS FISTULA Left 4/19/2023    Procedure: PTA, AV FISTULA;  Surgeon: FARIDEH Muñoz III, MD;  Location: [facility] CATH LAB;  Service: Peripheral Vascular;  Laterality: Left;    PERITONEAL CATHETER INSERTION      Permacath insertion  01/12/2017    PLACEMENT OF ARTERIOVENOUS GRAFT  12/28/2022    Procedure: INSERTION, GRAFT, ARTERIOVENOUS;  Surgeon: FARIDEH Muñoz III, MD;  [facility];  Service: Peripheral Vascular;;    REMOVAL, GRAFT, ARTERIOVENOUS, UPPER EXTREMITY Left 1/25/2023    Procedure: REMOVAL, GRAFT, ARTERIOVENOUS, UPPER EXTREMITY;  Surgeon: FARIDEH Muñoz III, MD;  Location: [facility] OR [facility] FLR;  Service: Peripheral Vascular;  Laterality: Left;     REVISION OF ARTERIOVENOUS FISTULA Left 5/1/2023    Procedure: REVISION, AV FISTULA;  Surgeon: FARIDEH Muñoz III, MD;  Location: [facility] OR 2ND FLR;  Service: Peripheral Vascular;  Laterality: Left;  LUE AVG revision    REVISION OF PROCEDURE INVOLVING ARTERIOVENOUS GRAFT Left 12/28/2022    Procedure: REVISION, PROCEDURE INVOLVING ARTERIOVENOUS GRAFT;  Surgeon: FARIDEH Muñoz III, MD;  Location: [facility] OR 2ND FLR;  Service: Peripheral Vascular;  Laterality: Left;    REVISION OF PROCEDURE INVOLVING ARTERIOVENOUS GRAFT Left 7/21/2023    Procedure: LEFT ARM ARTERIOVENOUS GRAFT EXCISION  AND LIGATION;  Surgeon: Obi Werner MD;  Location: [facility] OR;  Service: Vascular;  Laterality: Left;  Left AVG excision and revision with interposition    SALPINGOOPHORECTOMY Right 2016    KJB---DAVINCI    TONSILLECTOMY      TUBAL LIGATION         Review of patient's allergies indicates:   Allergen Reactions    Coreg [carvedilol] Other (See Comments)     Nausea/vomiting    Allopurinol      Other reaction(s): abnormal transaminases       Current Facility-Administered Medications on File Prior to Encounter   Medication    0.9%  NaCl infusion     Current Outpatient Medications on File Prior to Encounter   Medication Sig    acetaminophen (TYLENOL) 500 MG tablet Take 500 mg by mouth every 6 (six) hours as needed for Pain.    apixaban (ELIQUIS) 2.5 mg Tab Take 1 tablet (2.5 mg total) by mouth 2 (two) times daily.    aspirin 81 MG Chew Take 1 tablet (81 mg total) by mouth once daily.    cabozantinib (CABOMETYX) 60 mg Tab TAKE ONE TABLET BY MOUTH ONCE DAILY AT THE SAME TIME ON AN EMPTY STOMACH AT LEAST 1 HOUR BEFORE OR 2 HOURS AFTER EATING. AVOID GRAPEFRUIT PRODUCTS    cyclobenzaprine (FLEXERIL) 5 MG tablet TAKE 1 TABLET BY MOUTH DAILY AS NEEDED FOR MUSCLE SPASMS.    ENTRESTO 49-51 mg per tablet TAKE 1 TABLET BY MOUTH TWICE A DAY    epoetin david-epbx (RETACRIT INJ) Epoetin david - epbx (Retacrit)    fluticasone  propionate (FLONASE) 50 mcg/actuation nasal spray 1 spray (50 mcg total) by Each Nostril route 2 (two) times daily as needed (ear fullness).    HYDROcodone-acetaminophen (NORCO) 5-325 mg per tablet Take 1 tablet by mouth every 6 (six) hours as needed for Pain.    lanthanum (FOSRENOL) 1000 MG chewable tablet Take 1 tablet by mouth.    levothyroxine (SYNTHROID) 75 MCG tablet Take 1 tablet (75 mcg total) by mouth once daily.    metoprolol succinate (TOPROL-XL) 100 MG 24 hr tablet Take 1/2 tablet (50mg) once daily    mv,Ca,min-folic acid-vit K1 (ONE-A-DAY WOMEN'S 50 PLUS) 400-20 mcg Tab Take 1 tablet by mouth once daily.    naloxone (NARCAN) 1 mg/mL injection 2 mg (1 mg per nostril) by Nasal route as needed for opioid overdose; may repeat in 3 to 5 minutes if not effective. Call 911    cloNIDine 0.3 mg/24 hr td ptwk (CATAPRES) 0.3 mg/24 hr Place 1 patch onto the skin every 7 days.    hydrALAZINE (APRESOLINE) 100 MG tablet Take 1 tablet (100 mg total) by mouth every 12 (twelve) hours.    lidocaine-prilocaine (EMLA) cream APPLY ATLEAST 30 MINUTES BEFORE TREATMENT 3 TIMES A WEEK    [DISCONTINUED] amLODIPine (NORVASC) 5 MG tablet TAKE 1 TABLET BY MOUTH EVERY DAY     Family History       Problem Relation (Age of Onset)    Cataracts Maternal Aunt    Diabetes Father, Maternal Grandfather    Heart disease Sister    Hypertension Mother    Kidney disease Father    No Known Problems Sister, Sister, Brother, Brother, Maternal Uncle, Paternal Aunt, Paternal Uncle, Maternal Grandmother, Paternal Grandmother, Paternal Grandfather, Son, Son, Other          Tobacco Use    Smoking status: Never     Passive exposure: Never    Smokeless tobacco: Never   Substance and Sexual Activity    Alcohol use: No    Drug use: Yes     Types: Hydrocodone    Sexual activity: Not Currently     Review of Systems  Objective:     Vital Signs (Most Recent):  Temp: 97.5 °F (36.4 °C) (11/01/23 1647)  Pulse: 77 (11/01/23 1647)  Resp: 20 (11/01/23  1647)  BP: 116/74 (11/01/23 1647)  SpO2: 99 % (11/01/23 1647) Vital Signs (24h Range):  Temp:  [97.4 °F (36.3 °C)-97.5 °F (36.4 °C)] 97.5 °F (36.4 °C)  Pulse:  [77-86] 77  Resp:  [20-28] 20  SpO2:  [99 %-100 %] 99 %  BP: (116-118)/(72-76) 116/74     Weight: 38.6 kg (85 lb)  Body mass index is 14.59 kg/m².     Physical Exam  Constitutional:       General: She is not in acute distress.     Appearance: She is ill-appearing (chronic).   HENT:      Head: Normocephalic and atraumatic.      Mouth/Throat:      Mouth: Mucous membranes are moist.      Pharynx: Oropharynx is clear.   Eyes:      Extraocular Movements: Extraocular movements intact.   Cardiovascular:      Rate and Rhythm: Normal rate and regular rhythm.   Abdominal:      General: There is no distension.      Palpations: Abdomen is soft.      Tenderness: There is no abdominal tenderness. There is no guarding or rebound.   Musculoskeletal:         General: No swelling or tenderness.      Cervical back: Neck supple. No rigidity.   Skin:     General: Skin is warm and dry.   Neurological:      General: No focal deficit present.      Mental Status: She is alert and oriented to person, place, and time.   Psychiatric:         Mood and Affect: Mood normal.         Behavior: Behavior normal.                Significant Labs: All pertinent labs within the past 24 hours have been reviewed.    Significant Imaging: I have reviewed all pertinent imaging results/findings within the past 24 hours.    Assessment/Plan:     * Acute blood loss anemia  Patient's anemia is currently uncontrolled. Has received 1 units of PRBCs on 11/1/23. Etiology likely d/t acute blood loss which was from possible gi bleed and chronic disease due to Malignancy and esrd  Current CBC reviewed-   Lab Results   Component Value Date    HGB 5.9 (LL) 11/01/2023    HCT 21.0 (L) 11/01/2023     Monitor serial CBC and transfuse if patient becomes hemodynamically unstable, symptomatic or H/H drops below 7/21. GI  consulted.    PAD (peripheral artery disease)  Chronic wounds noted. On doxycycline as outpatient. Will continue and consult to wound care    ACP (advance care planning)  Advance Care Planning     Date: 11/01/2023  Reviewed previous records. [facility]. Continues on dialysis at this time. Discussed with  and wife at bedside who confirm DNR status. 5 minutes spent in ACP conversation. Palliative care consulted.          Severe protein-calorie malnutrition  Nutrition consulted. Most recent weight and BMI monitored-     Measurements:  Wt Readings from Last 1 Encounters:   11/01/23 38.6 kg (85 lb)   Body mass index is 14.59 kg/m².    Patient has been screened and assessed by RD.    Malnutrition Type:  Context:    Level:      Malnutrition Characteristic Summary:       Interventions/Recommendations (treatment strategy):       Body mass index (BMI) less than 19  In setting of malignancy      Metastatic renal cell carcinoma to lung, unspecified laterality    Cancer Staging   Renal cell carcinoma of left kidney  Staging form: Kidney, AJCC 8th Edition  - Pathologic stage from 1/12/2016: Stage Unknown (pT1b(2), pNX, cM0) - Unsigned  - Clinical stage from 11/22/2016: Stage IV (rcTX, cN0, pM1) - Signed by Liset Ngo NP on 9/13/2023      Debility  Patient with Chronic debility due to severe malnutrition.     PAF (paroxysmal atrial fibrillation)  Patient with Paroxysmal (<7 days) atrial fibrillation which is controlled currently with Beta Blocker. Patient is currently in sinus rhythm.[id number] Score: 2.  Anticoagulation indicated. Anticoagulation done with eliquis at home, but held in setting of possible bleed.    ESRD (end stage renal disease) on dialysis  Creatine stable for now. BMP reviewed- noted Estimated Creatinine Clearance: 8.2 mL/min (A) (based on SCr of 4.5 mg/dL (H)). according to latest data. Based on current GFR, CKD stage is end stage.  Monitor UOP and serial  BMP and adjust therapy as needed. Renally dose meds. Avoid nephrotoxic medications and procedures.  -nephrology consulted    Cardiomyopathy, nonischemic  No signs of fluid overload currently. Monitor for signs with blood transfusion      Essential hypertension  Chronic, controlled. Latest blood pressure and vitals reviewed-     Temp:  [97.4 °F (36.3 °C)]   Pulse:  [81-86]   Resp:  [28]   BP: (116-118)/(72-76)   SpO2:  [100 %] .   Home meds for hypertension were reviewed and noted below.   Hypertension Medications             ENTRESTO 49-51 mg per tablet TAKE 1 TABLET BY MOUTH TWICE A DAY    metoprolol succinate (TOPROL-XL) 100 MG 24 hr tablet Take 1/2 tablet (50mg) once daily    cloNIDine 0.3 mg/24 hr td ptwk (CATAPRES) 0.3 mg/24 hr Place 1 patch onto the skin every 7 days.    hydrALAZINE (APRESOLINE) 100 MG tablet Take 1 tablet (100 mg total) by mouth every 12 (twelve) hours.          While in the hospital, will manage blood pressure as follows; Adjust home antihypertensive regimen as follows- holding bp medications in setting of possible gi bleed    Will utilize p.r.n. blood pressure medication only if patient's blood pressure greater than 180/110 and she develops symptoms such as worsening chest pain or shortness of breath.      VTE Risk Mitigation (From admission, onward)         Ordered     IP VTE HIGH RISK PATIENT  Once         11/01/23 1625     Place sequential compression device  Until discontinued         11/01/23 1625     Reason for No Pharmacological VTE Prophylaxis  Once        Question:  Reasons:  Answer:  Active Bleeding    11/01/23 1625     Place sequential compression device  Until discontinued         11/01/23 1624                               Gustavo Cartagena III, MD  Department of Hospital Medicine  [facility] [place] - Emergency Dept

## 2023-11-01 NOTE — PLAN OF CARE
Case Management Assessment     PCP: Sowmya Mccain  Pharmacy: [facility] on [address]    Patient Arrived From: home  Existing Help at Home: spouse    Barriers to Discharge: none    Discharge Plan:    A. In home Hospice   B. Home with family      SW completed initial assessment and discussed discharge planning with patient and her spouse Otis at her bedside. Patient stated that she's currently on dialysis at [facility], Sat and would like to resume upon discharge. Patient stated that she had in home hospice, but was discharged today due to the hospital admission. Patient shared that she would like to enroll with hospice again. Patient's spouse will provide transportation for her to get home when discharge from the hospital.     11/01/23 8212   Discharge Assessment   Assessment Type Discharge Planning Assessment   Confirmed/corrected address, phone number and insurance Yes   Confirmed Demographics Correct on Facesheet   Source of Information patient;family   Communicated MAVIS with patient/caregiver Date not available/Unable to determine   Reason For Admission Acute blood loss anemia   People in Home spouse   Do you expect to return to your current living situation? Yes   Do you have help at home or someone to help you manage your care at home? Yes   Who are your caregiver(s) and their phone number(s)? Otis Bloom (Spouse)   [phone] (Mobile)   Prior to hospitilization cognitive status: Alert/Oriented   Current cognitive status: Alert/Oriented   Equipment Currently Used at Home walker, rolling;oxygen;hospital bed;shower chair   Readmission within 30 days? Yes   Patient currently being followed by outpatient case management? No   Do you currently have service(s) that help you manage your care at home? No   Do you take prescription medications? Yes   Do you have prescription coverage? Yes   Coverage Medicare   Do you have any problems affording any of your prescribed medications? No   Is the patient  taking medications as prescribed? yes   Who is going to help you get home at discharge? Otis Bloom (Spouse)   [phone] (Mobile)   How do you get to doctors appointments? family or friend will provide   Are you on dialysis? Yes   Dialysis Name and Scheduled days [facility] Thurs and Sat   Do you take coumadin? No   DME Needed Upon Discharge  none   Discharge Plan discussed with: Patient;Spouse/sig other   Name(s) and Number(s) Otis Bloom (Spouse)   [phone] (Mobile)   Transition of Care Barriers None   Discharge Plan A Hospice/home   Discharge Plan B Home with family   OTHER   Name(s) of People in Home Otis Bloom (Spouse)   [phone] (Mobile)

## 2023-11-01 NOTE — ASSESSMENT & PLAN NOTE
Patient's anemia is currently uncontrolled. Has received 1 units of PRBCs on 11/1/23. Etiology likely d/t acute blood loss which was from possible gi bleed and chronic disease due to Malignancy and esrd  Current CBC reviewed-   Lab Results   Component Value Date    HGB 5.9 (LL) 11/01/2023    HCT 21.0 (L) 11/01/2023     Monitor serial CBC and transfuse if patient becomes hemodynamically unstable, symptomatic or H/H drops below 7/21. GI consulted.

## 2023-11-01 NOTE — ASSESSMENT & PLAN NOTE
Chronic, controlled. Latest blood pressure and vitals reviewed-     Temp:  [97.4 °F (36.3 °C)]   Pulse:  [81-86]   Resp:  [28]   BP: (116-118)/(72-76)   SpO2:  [100 %] .   Home meds for hypertension were reviewed and noted below.   Hypertension Medications             ENTRESTO 49-51 mg per tablet TAKE 1 TABLET BY MOUTH TWICE A DAY    metoprolol succinate (TOPROL-XL) 100 MG 24 hr tablet Take 1/2 tablet (50mg) once daily    cloNIDine 0.3 mg/24 hr td ptwk (CATAPRES) 0.3 mg/24 hr Place 1 patch onto the skin every 7 days.    hydrALAZINE (APRESOLINE) 100 MG tablet Take 1 tablet (100 mg total) by mouth every 12 (twelve) hours.          While in the hospital, will manage blood pressure as follows; Adjust home antihypertensive regimen as follows- holding bp medications in setting of possible gi bleed    Will utilize p.r.n. blood pressure medication only if patient's blood pressure greater than 180/110 and she develops symptoms such as worsening chest pain or shortness of breath.

## 2023-11-01 NOTE — ASSESSMENT & PLAN NOTE
Cancer Staging   Renal cell carcinoma of left kidney  Staging form: Kidney, AJCC 8th Edition  - Pathologic stage from 1/12/2016: Stage Unknown (pT1b(2), pNX, cM0) - Unsigned  - Clinical stage from 11/22/2016: Stage IV (rcTX, cN0, pM1) - Signed by Liset Ngo NP on 9/13/2023

## 2023-11-01 NOTE — ASSESSMENT & PLAN NOTE
Nutrition consulted. Most recent weight and BMI monitored-     Measurements:  Wt Readings from Last 1 Encounters:   11/01/23 38.6 kg (85 lb)   Body mass index is 14.59 kg/m².    Patient has been screened and assessed by RD.    Malnutrition Type:  Context:    Level:      Malnutrition Characteristic Summary:       Interventions/Recommendations (treatment strategy):

## 2023-11-01 NOTE — ED TRIAGE NOTES
Eva Bloom, a 58 y.o. female, presents to ED via POV with complaints of low H&H. States her dialysis center sent them for hgb of 5.6. Last dialysis yesterday. Currently on Hospice and receiving chemo. AAOx4.

## 2023-11-01 NOTE — HPI
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 Pt was called by outpatient dialysis center due to reported hgb of 5.6 with previous of 9.5 from the week prior. Pt had labs done at dialysis on 10/31 which she completed a full session. Pt denies hematochezia, melena, but does endorse generalized fatigue. Pt states if she had not been called she would not have come to the hospital. Pt is DNR and currently enrolled in hospice, but continues dialysis treatments. Pt denies alcohol, tobacco, or drug use. In ed hgb noted to be 5.9 and hemoccult positive. Pt given protonix and 1 unit prbc ordered prior to admission with GI and nephrology consultation.

## 2023-11-01 NOTE — ASSESSMENT & PLAN NOTE
Patient with Paroxysmal (<7 days) atrial fibrillation which is controlled currently with Beta Blocker. Patient is currently in sinus rhythm.[id number] Score: 2.  Anticoagulation indicated. Anticoagulation done with eliquis at home, but held in setting of possible bleed.

## 2023-11-02 VITALS
DIASTOLIC BLOOD PRESSURE: 82 MMHG | WEIGHT: 104.94 LBS | TEMPERATURE: 96 F | RESPIRATION RATE: 18 BRPM | OXYGEN SATURATION: 96 % | HEART RATE: 60 BPM | SYSTOLIC BLOOD PRESSURE: 131 MMHG | BODY MASS INDEX: 17.92 KG/M2 | HEIGHT: 64 IN

## 2023-11-02 LAB
ANION GAP SERPL CALC-SCNC: 22 MMOL/L (ref 8–16)
ANISOCYTOSIS BLD QL SMEAR: SLIGHT
BASOPHILS # BLD AUTO: 0.18 K/UL (ref 0–0.2)
BASOPHILS # BLD AUTO: 0.19 K/UL (ref 0–0.2)
BASOPHILS NFR BLD: 1.4 % (ref 0–1.9)
BASOPHILS NFR BLD: 1.7 % (ref 0–1.9)
BUN SERPL-MCNC: 71 MG/DL (ref 6–20)
CALCIUM SERPL-MCNC: 7.5 MG/DL (ref 8.7–10.5)
CHLORIDE SERPL-SCNC: 102 MMOL/L (ref 95–110)
CO2 SERPL-SCNC: 17 MMOL/L (ref 23–29)
CREAT SERPL-MCNC: 4.8 MG/DL (ref 0.5–1.4)
DIFFERENTIAL METHOD: ABNORMAL
DIFFERENTIAL METHOD: ABNORMAL
EOSINOPHIL # BLD AUTO: 0.3 K/UL (ref 0–0.5)
EOSINOPHIL # BLD AUTO: 0.3 K/UL (ref 0–0.5)
EOSINOPHIL NFR BLD: 2.3 % (ref 0–8)
EOSINOPHIL NFR BLD: 2.5 % (ref 0–8)
ERYTHROCYTE [DISTWIDTH] IN BLOOD BY AUTOMATED COUNT: 21.5 % (ref 11.5–14.5)
ERYTHROCYTE [DISTWIDTH] IN BLOOD BY AUTOMATED COUNT: 22.3 % (ref 11.5–14.5)
EST. GFR  (NO RACE VARIABLE): 10 ML/MIN/1.73 M^2
GLUCOSE SERPL-MCNC: 61 MG/DL (ref 70–110)
HCT VFR BLD AUTO: 24.3 % (ref 37–48.5)
HCT VFR BLD AUTO: 25.5 % (ref 37–48.5)
HGB BLD-MCNC: 7.4 G/DL (ref 12–16)
HGB BLD-MCNC: 7.4 G/DL (ref 12–16)
HYPOCHROMIA BLD QL SMEAR: ABNORMAL
IMM GRANULOCYTES # BLD AUTO: 0.1 K/UL (ref 0–0.04)
IMM GRANULOCYTES # BLD AUTO: 0.12 K/UL (ref 0–0.04)
IMM GRANULOCYTES NFR BLD AUTO: 0.9 % (ref 0–0.5)
IMM GRANULOCYTES NFR BLD AUTO: 1 % (ref 0–0.5)
LYMPHOCYTES # BLD AUTO: 1.6 K/UL (ref 1–4.8)
LYMPHOCYTES # BLD AUTO: 1.8 K/UL (ref 1–4.8)
LYMPHOCYTES NFR BLD: 14.3 % (ref 18–48)
LYMPHOCYTES NFR BLD: 14.5 % (ref 18–48)
MCH RBC QN AUTO: 31.6 PG (ref 27–31)
MCH RBC QN AUTO: 31.8 PG (ref 27–31)
MCHC RBC AUTO-ENTMCNC: 29 G/DL (ref 32–36)
MCHC RBC AUTO-ENTMCNC: 30.5 G/DL (ref 32–36)
MCV RBC AUTO: 104 FL (ref 82–98)
MCV RBC AUTO: 109 FL (ref 82–98)
MONOCYTES # BLD AUTO: 1.2 K/UL (ref 0.3–1)
MONOCYTES # BLD AUTO: 1.2 K/UL (ref 0.3–1)
MONOCYTES NFR BLD: 10.9 % (ref 4–15)
MONOCYTES NFR BLD: 9.5 % (ref 4–15)
NEUTROPHILS # BLD AUTO: 7.9 K/UL (ref 1.8–7.7)
NEUTROPHILS # BLD AUTO: 8.9 K/UL (ref 1.8–7.7)
NEUTROPHILS NFR BLD: 69.9 % (ref 38–73)
NEUTROPHILS NFR BLD: 71.1 % (ref 38–73)
NRBC BLD-RTO: 1 /100 WBC
NRBC BLD-RTO: 1 /100 WBC
OVALOCYTES BLD QL SMEAR: ABNORMAL
PLATELET # BLD AUTO: 259 K/UL (ref 150–450)
PLATELET # BLD AUTO: 277 K/UL (ref 150–450)
PMV BLD AUTO: 10.4 FL (ref 9.2–12.9)
PMV BLD AUTO: 10.9 FL (ref 9.2–12.9)
POCT GLUCOSE: 86 MG/DL (ref 70–110)
POIKILOCYTOSIS BLD QL SMEAR: SLIGHT
POLYCHROMASIA BLD QL SMEAR: ABNORMAL
POTASSIUM SERPL-SCNC: 5.5 MMOL/L (ref 3.5–5.1)
RBC # BLD AUTO: 2.33 M/UL (ref 4–5.4)
RBC # BLD AUTO: 2.34 M/UL (ref 4–5.4)
SODIUM SERPL-SCNC: 141 MMOL/L (ref 136–145)
WBC # BLD AUTO: 11.32 K/UL (ref 3.9–12.7)
WBC # BLD AUTO: 12.57 K/UL (ref 3.9–12.7)

## 2023-11-02 PROCEDURE — 85025 COMPLETE CBC W/AUTO DIFF WBC: CPT | Mod: 91 | Performed by: STUDENT IN AN ORGANIZED HEALTH CARE EDUCATION/TRAINING PROGRAM

## 2023-11-02 PROCEDURE — 63600175 PHARM REV CODE 636 W HCPCS: Performed by: STUDENT IN AN ORGANIZED HEALTH CARE EDUCATION/TRAINING PROGRAM

## 2023-11-02 PROCEDURE — 36415 COLL VENOUS BLD VENIPUNCTURE: CPT | Performed by: STUDENT IN AN ORGANIZED HEALTH CARE EDUCATION/TRAINING PROGRAM

## 2023-11-02 PROCEDURE — 80048 BASIC METABOLIC PNL TOTAL CA: CPT | Performed by: STUDENT IN AN ORGANIZED HEALTH CARE EDUCATION/TRAINING PROGRAM

## 2023-11-02 PROCEDURE — 80100016 HC MAINTENANCE HEMODIALYSIS

## 2023-11-02 PROCEDURE — C9113 INJ PANTOPRAZOLE SODIUM, VIA: HCPCS | Performed by: STUDENT IN AN ORGANIZED HEALTH CARE EDUCATION/TRAINING PROGRAM

## 2023-11-02 PROCEDURE — 25000003 PHARM REV CODE 250: Performed by: STUDENT IN AN ORGANIZED HEALTH CARE EDUCATION/TRAINING PROGRAM

## 2023-11-02 PROCEDURE — 63600175 PHARM REV CODE 636 W HCPCS: Mod: JZ | Performed by: INTERNAL MEDICINE

## 2023-11-02 RX ORDER — PANTOPRAZOLE SODIUM 40 MG/1
40 TABLET, DELAYED RELEASE ORAL 2 TIMES DAILY
Qty: 60 TABLET | Refills: 11 | Status: SHIPPED | OUTPATIENT
Start: 2023-11-02 | End: 2024-11-01

## 2023-11-02 RX ORDER — METOPROLOL SUCCINATE 100 MG/1
TABLET, EXTENDED RELEASE ORAL
Qty: 90 TABLET | Refills: 3 | Status: ON HOLD
Start: 2023-11-02 | End: 2024-01-05 | Stop reason: HOSPADM

## 2023-11-02 RX ORDER — MUPIROCIN 20 MG/G
OINTMENT TOPICAL 2 TIMES DAILY
Status: DISCONTINUED | OUTPATIENT
Start: 2023-11-02 | End: 2023-11-02 | Stop reason: HOSPADM

## 2023-11-02 RX ADMIN — DEXTROSE MONOHYDRATE 125 ML: 100 INJECTION, SOLUTION INTRAVENOUS at 05:11

## 2023-11-02 RX ADMIN — PANTOPRAZOLE SODIUM 40 MG: 40 INJECTION, POWDER, FOR SOLUTION INTRAVENOUS at 09:11

## 2023-11-02 RX ADMIN — DOXYCYCLINE HYCLATE 100 MG: 100 TABLET, FILM COATED ORAL at 09:11

## 2023-11-02 RX ADMIN — ONDANSETRON 8 MG: 8 TABLET, ORALLY DISINTEGRATING ORAL at 03:11

## 2023-11-02 RX ADMIN — ACETAMINOPHEN 650 MG: 325 TABLET ORAL at 12:11

## 2023-11-02 RX ADMIN — ERYTHROPOIETIN 10000 UNITS: 10000 INJECTION, SOLUTION INTRAVENOUS; SUBCUTANEOUS at 09:11

## 2023-11-02 RX ADMIN — MUPIROCIN: 20 OINTMENT TOPICAL at 09:11

## 2023-11-02 NOTE — NURSING
Patient returned to room from dialysis, no distress noted, sitting on side of bed eating, safety maintained.

## 2023-11-02 NOTE — CONSULTS
[facility]  Wound Care    Patient Name:  Eva Bloom   MRN:  [id number]  Date: 11/2/2023  Diagnosis: Acute blood loss anemia    History:     Past Medical History:   Diagnosis Date    Anemia     Anticoagulant long-term use     Atrial fibrillation     Bronchitis 03/01/2017    Cancer 2016    kidney cancer    CHF (congestive heart failure), NYHA class II, chronic, systolic     CMV (cytomegalovirus) antibody positive     Encounter for blood transfusion     ESRD (end stage renal disease)     Essential hypertension 09/23/2015    H/O herpes simplex type 2 infection     Herpes simplex type 1 antibody positive     History of kidney cancer     s/p left nephrectomy 1/2016    Hyperparathyroidism, unspecified     Hyperuricemia without signs of inflammatory arthritis and tophaceous disease     Kidney stones     LGSIL (low grade squamous intraepithelial dysplasia)     Myocardiopathy 07/21/2017    Prediabetes     Proteinuria     Renal disorder     Thyroid nodule     Urate nephropathy        Social History     Socioeconomic History    Marital status:     Number of children: 2   Occupational History    Occupation: [employer]     Employer: [id number]   Tobacco Use    Smoking status: Never     Passive exposure: Never    Smokeless tobacco: Never   Substance and Sexual Activity    Alcohol use: No    Drug use: Yes     Types: Hydrocodone    Sexual activity: Not Currently     Social Determinants of Health     Financial Resource Strain: Low Risk  (9/28/2023)    Overall Financial Resource Strain (CARDIA)     Difficulty of Paying Living Expenses: Not hard at all   Food Insecurity: No Food Insecurity (9/28/2023)    Hunger Vital Sign     Worried About Running Out of Food in the Last Year: Never true     Ran Out of Food in the Last Year: Never true   Transportation Needs: No Transportation Needs (9/28/2023)    PRAPARE - Transportation     Lack of Transportation (Medical): No     Lack of Transportation (Non-Medical): No  "  Physical Activity: Inactive (9/28/2023)    Exercise Vital Sign     Days of Exercise per Week: 0 days     Minutes of Exercise per Session: 0 min   Stress: No Stress Concern Present (9/28/2023)    [employer] - Occupational Stress Questionnaire     Feeling of Stress : Not at all   Social Connections: Moderately Integrated (9/28/2023)    Social Connection and Isolation Panel [NHANES]     Frequency of Communication with Friends and Family: More than three times a week     Frequency of Social Gatherings with Friends and Family: More than three times a week     Attends Taoist Services: More than 4 times per year     Active Member of Clubs or Organizations: No     Attends Club or Organization Meetings: Never     Marital Status:    Housing Stability: Low Risk  (9/28/2023)    Housing Stability Vital Sign     Unable to Pay for Housing in the Last Year: No     Number of Places Lived in the Last Year: 1     Unstable Housing in the Last Year: No       Precautions:     Allergies as of 11/01/2023 - Reviewed 11/01/2023   Allergen Reaction Noted    Coreg [carvedilol] Other (See Comments) 05/17/2017    Allopurinol  07/17/2012       [facility] Assessment Details/Treatment   Nursing consult for altered skin integrity left 3rd toe, left heel, right knee and thigh, left arm and sacrum  A 58 year old female admitted 11/1/23 from home with acute blood loss anemia; PAD; advance care planning; severe protein-calorie malnutrition; metastatic renal cell carcinoma of lung; debility; paroxysmal atrial fibrillation; ESRD on HD; cardiomyopathy; essential hypertension  11/2 WBC 11.32 Hgb 7.4 Hct 25.5   11/1 Alb 2.0 Weight 104 lbs   On Isoflex mattress; Marcelino score 13  When patient returns home, spouse to resume services from [facility]; patient to discharge home today  Assessment:  Sitting up on side of bed with . Mepilex dressings in place on legs without "strike through" drainage. No surrounding " erythema/edema.   reports he will do dressing change to arm wound with [facility] when they get home. Would prefer wound care with [facility].  No [facility] assistance needed.      11/02/2023

## 2023-11-02 NOTE — NURSING
AVS virtually reviewed with patient and her  in its entirety with emphasis on medications, follow-up appointments and reasons to return to the ED or contact the [facility] On Call Nurse Care Line. Patient also encouraged to utilize their patient portal. Ease and convenience of use reiterated. Education complete and patient voiced understanding. All questions answered. Discharge teaching complete.

## 2023-11-02 NOTE — PLAN OF CARE
Problem: Adjustment to Illness (Gastrointestinal Bleeding)  Goal: Optimal Coping with Acute Illness  Outcome: Ongoing, Progressing     Problem: Adult Inpatient Plan of Care  Goal: Plan of Care Review  Outcome: Ongoing, Progressing     Problem: Fall Injury Risk  Goal: Absence of Fall and Fall-Related Injury  Outcome: Ongoing, Progressing     Problem: Skin Injury Risk Increased  Goal: Skin Health and Integrity  Outcome: Ongoing, Progressing     Problem: Fatigue  Goal: Improved Activity Tolerance  Outcome: Ongoing, Progressing     Problem: Infection  Goal: Absence of Infection Signs and Symptoms  Outcome: Ongoing, Progressing     Problem: Impaired Wound Healing  Goal: Optimal Wound Healing  Outcome: Ongoing, Progressing

## 2023-11-02 NOTE — NURSING
[facility]  Nurses Note -- 4 Eyes      11/2/2023       Skin assessed on: Q Shift      [] No Pressure Injuries Present    []Prevention Measures Documented    [x] Yes LDA  for Pressure Injury Previously documented     [] Yes New Pressure Injury Discovered   [] LDA for New Pressure Injury Added      Attending RN:  Cindy Conde, FELIPE     Second RN:  Janelle Nuñez RN

## 2023-11-02 NOTE — NURSING
"Pt lying in bed, blood transfusion in process. Altered skin integrity noted with second RN. Left subclavian catheter noted, dressing dry, clean and intact. Right upper "clonidine patch" noted. Bed alarm set, call light in reach, instructed pt to call for assistance.     [facility] [place]  Nurses Note -- 4 Eyes      11/1/2023       Skin assessed on: Q Shift      [] No Pressure Injuries Present    []Prevention Measures Documented      [x] Yes LDA  for Pressure Injury Previously documented  Redness to sacrum  Left three toe black with dressing  Left heel   Right knee  Right thigh x3 wounds    [] Yes New Pressure Injury Discovered   [] LDA for New Pressure Injury Added      Attending RN:  Janelle Nuñez RN     Second RN:  FELIPE Landin        "

## 2023-11-02 NOTE — PLAN OF CARE
Problem: Bleeding (Gastrointestinal Bleeding)  Goal: Hemostasis  Outcome: Ongoing, Progressing     Problem: Adult Inpatient Plan of Care  Goal: Plan of Care Review  Outcome: Ongoing, Progressing

## 2023-11-02 NOTE — CONSULTS
"                                         Renal Consult    Date of Admission:  11/1/2023  2:03 PM        Chief Complaint:   Chief Complaint   Patient presents with    Abnormal Lab     Sent by Dialysis for Hgb 5.6, currently on hospice, tachpneic       HPI: 57 y/o female with a PMHx. Of ESRD on HD (TTS) chronic combined HF, CMP s/p Defibrillator, A-fib. RCCA s/p L-nephrectomy, s/p Graft excision and ligation (7/21/23) who presents to the ED with abnormal Labs.    As per H&P: "Pt was called by outpatient dialysis center due to reported hgb of 5.6 with previous of 9.5 from the week prior. Pt had labs done at dialysis on 10/31 which she completed a full session. Pt denies hematochezia, melena, but does endorse generalized fatigue. Pt states if she had not been called she would not have come to the hospital. Pt is DNR and currently enrolled in hospice, but continues dialysis treatments. Pt denies alcohol, tobacco, or drug use. In ed hgb noted to be 5.9 and hemoccult positive. Pt given protonix and 1 unit prbc ordered prior to admission with GI and nephrology consultation".        PMH:  Past Medical History:   Diagnosis Date    Anemia     Anticoagulant long-term use     Atrial fibrillation     Bronchitis 03/01/2017    Cancer 2016    kidney cancer    CHF (congestive heart failure), NYHA class II, chronic, systolic     CMV (cytomegalovirus) antibody positive     Encounter for blood transfusion     ESRD (end stage renal disease)     Essential hypertension 09/23/2015    H/O herpes simplex type 2 infection     Herpes simplex type 1 antibody positive     History of kidney cancer     s/p left nephrectomy 1/2016    Hyperparathyroidism, unspecified     Hyperuricemia without signs of inflammatory arthritis and tophaceous disease     Kidney stones     LGSIL (low grade squamous intraepithelial dysplasia)     Myocardiopathy 07/21/2017    Prediabetes     Proteinuria     Renal disorder     Thyroid nodule     Urate nephropathy  "       PSH:  Past Surgical History:   Procedure Laterality Date    BREAST CYST EXCISION      COLONOSCOPY N/A 11/12/2015    COLONOSCOPY N/A 3/12/2021    Procedure: COLONOSCOPY;  Surgeon: Brendon Lanier MD;  [facility] END[facility];  Service: Endoscopy;  Laterality: N/A;  covid test 3/9, labs prior, prep instr mailed -ml    EXCISION OF ARTERIOVENOUS FISTULA Left 9/27/2023    Procedure: EXCISION, AV FISTULA;  Surgeon: FARIDEH Muñoz III, MD;  [facility];  Service: Vascular;  Laterality: Left;  LUE AV graft excision    INSERTION OF BIVENTRICULAR IMPLANTABLE CARDIOVERTER-DEFIBRILLATOR (ICD)  04/2021    INSERTION OF TUNNELED CENTRAL VENOUS CATHETER (CVC) WITH SUBCUTANEOUS PORT N/A 1/25/2023    Procedure: INSERTION, DUAL LUMEN CATHETER WITH PORT, WITH IMAGING GUIDANCE;  Surgeon: FARIDEH Muñoz III, MD;  [facility];  Service: Peripheral Vascular;  Laterality: N/A;  possinble permcath placment    NEPHRECTOMY-LAPAROSCOPIC Left 01/12/2016    PERCUTANEOUS TRANSLUMINAL ANGIOPLASTY OF ARTERIOVENOUS FISTULA Left 4/19/2023    Procedure: PTA, AV FISTULA;  Surgeon: FARIDEH Muñoz III, MD;  Location: [facility] CATH LAB;  Service: Peripheral Vascular;  Laterality: Left;    PERITONEAL CATHETER INSERTION      Permacath insertion  01/12/2017    PLACEMENT OF ARTERIOVENOUS GRAFT  12/28/2022    Procedure: INSERTION, GRAFT, ARTERIOVENOUS;  Surgeon: FARIDEH Muñoz III, MD;  [facility];  Service: Peripheral Vascular;;    REMOVAL, GRAFT, ARTERIOVENOUS, UPPER EXTREMITY Left 1/25/2023    Procedure: REMOVAL, GRAFT, ARTERIOVENOUS, UPPER EXTREMITY;  Surgeon: FARIDEH Muñoz III, MD;  Location: [facility];  Service: Peripheral Vascular;  Laterality: Left;    REVISION OF ARTERIOVENOUS FISTULA Left 5/1/2023    Procedure: REVISION, AV FISTULA;  Surgeon: FARIDEH Muñoz III, MD;  [facility];  Service: Peripheral Vascular;  Laterality: Left;  LUE AVG revision    REVISION OF PROCEDURE  INVOLVING ARTERIOVENOUS GRAFT Left 12/28/2022    Procedure: REVISION, PROCEDURE INVOLVING ARTERIOVENOUS GRAFT;  Surgeon: FARIDEH Muñoz III, MD;  [facility];  Service: Peripheral Vascular;  Laterality: Left;    REVISION OF PROCEDURE INVOLVING ARTERIOVENOUS GRAFT Left 7/21/2023    Procedure: LEFT ARM ARTERIOVENOUS GRAFT EXCISION  AND LIGATION;  Surgeon: Obi Werner MD;  Location: [facility];  Service: Vascular;  Laterality: Left;  Left AVG excision and revision with interposition    SALPINGOOPHORECTOMY Right 2016    KJB---DAVINCI    TONSILLECTOMY      TUBAL LIGATION         Allergies:  Review of patient's allergies indicates:   Allergen Reactions    Coreg [carvedilol] Other (See Comments)     Nausea/vomiting    Allopurinol      Other reaction(s): abnormal transaminases       Current Facility-Administered Medications on File Prior to Encounter   Medication Dose Route Frequency Provider Last Rate Last Admin    0.9%  NaCl infusion   Intravenous Continuous Soni Bhatti MD   New Bag at 07/21/23 1116     Current Outpatient Medications on File Prior to Encounter   Medication Sig Dispense Refill    acetaminophen (TYLENOL) 500 MG tablet Take 500 mg by mouth every 6 (six) hours as needed for Pain.      apixaban (ELIQUIS) 2.5 mg Tab Take 1 tablet (2.5 mg total) by mouth 2 (two) times daily. 60 tablet 11    aspirin 81 MG Chew Take 1 tablet (81 mg total) by mouth once daily. 30 tablet 11    cabozantinib (CABOMETYX) 60 mg Tab TAKE ONE TABLET BY MOUTH ONCE DAILY AT THE SAME TIME ON AN EMPTY STOMACH AT LEAST 1 HOUR BEFORE OR 2 HOURS AFTER EATING. AVOID GRAPEFRUIT PRODUCTS 30 tablet 4    ENTRESTO 49-51 mg per tablet TAKE 1 TABLET BY MOUTH TWICE A  tablet 3    epoetin david-epbx (RETACRIT INJ) Epoetin david - epbx (Retacrit)      lanthanum (FOSRENOL) 1000 MG chewable tablet Take 1 tablet by mouth.      levothyroxine (SYNTHROID) 75 MCG tablet Take 1 tablet (75 mcg total) by mouth once daily. 30  tablet 11    metoprolol succinate (TOPROL-XL) 100 MG 24 hr tablet Take 1/2 tablet (50mg) once daily 90 tablet 3    mv,Ca,min-folic acid-vit K1 (ONE-A-DAY WOMEN'S 50 PLUS) 400-20 mcg Tab Take 1 tablet by mouth once daily.      cloNIDine 0.3 mg/24 hr td ptwk (CATAPRES) 0.3 mg/24 hr Place 1 patch onto the skin every 7 days. 4 patch 11    cyclobenzaprine (FLEXERIL) 5 MG tablet TAKE 1 TABLET BY MOUTH DAILY AS NEEDED FOR MUSCLE SPASMS. 30 tablet 1    fluticasone propionate (FLONASE) 50 mcg/actuation nasal spray 1 spray (50 mcg total) by Each Nostril route 2 (two) times daily as needed (ear fullness). 16 g 2    hydrALAZINE (APRESOLINE) 100 MG tablet Take 1 tablet (100 mg total) by mouth every 12 (twelve) hours. 180 tablet 2    HYDROcodone-acetaminophen (NORCO) 5-325 mg per tablet Take 1 tablet by mouth every 6 (six) hours as needed for Pain. 10 tablet 0    lidocaine-prilocaine (EMLA) cream APPLY ATLEAST 30 MINUTES BEFORE TREATMENT 3 TIMES A WEEK 30 g 11    naloxone (NARCAN) 1 mg/mL injection 2 mg (1 mg per nostril) by Nasal route as needed for opioid overdose; may repeat in 3 to 5 minutes if not effective. Call 911 2 mL 11    [DISCONTINUED] amLODIPine (NORVASC) 5 MG tablet TAKE 1 TABLET BY MOUTH EVERY DAY 90 tablet 3       Medications:  Current Facility-Administered Medications   Medication Dose Route Frequency Provider Last Rate Last Admin    0.9%  NaCl infusion (for blood administration)   Intravenous Q24H PRN Hudson Ramos MD        acetaminophen tablet 650 mg  650 mg Oral Q8H PRN Gustavo Cartagena III, MD        acetaminophen tablet 650 mg  650 mg Oral Q4H PRN Gustavo Cartagena III, MD        dextrose 10% bolus 125 mL 125 mL  12.5 g Intravenous PRN Gustavo Cartagena III,  mL/hr at 11/02/23 0557 125 mL at 11/02/23 0557    dextrose 10% bolus 250 mL 250 mL  25 g Intravenous PRN Gustavo Cartagena III, MD        doxycycline tablet 100 mg  100 mg Oral Q12H Gustavo Cartagena III, MD   100 mg at 11/01/23 8245     glucagon (human recombinant) injection 1 mg  1 mg Intramuscular PRN Gustavo Cartagena III, MD        glucose chewable tablet 16 g  16 g Oral PRN Gustavo Cartagena III, MD        glucose chewable tablet 24 g  24 g Oral PRN Gustavo Cartagena III, MD        HYDROcodone-acetaminophen  mg per tablet 1 tablet  1 tablet Oral Q6H PRN Gustavo Cartagena III, MD        HYDROcodone-acetaminophen 5-325 mg per tablet 1 tablet  1 tablet Oral Q6H PRN Gustavo Cartagena III, MD        naloxone 0.4 mg/mL injection 0.02 mg  0.02 mg Intravenous PRN Gustavo Cartagena III, MD        ondansetron disintegrating tablet 8 mg  8 mg Oral Q8H PRN Gustavo Cartagena III, MD   8 mg at 11/02/23 0356    pantoprazole injection 40 mg  40 mg Intravenous BID Gustavo Cartagena III, MD        sodium chloride 0.9% flush 10 mL  10 mL Intravenous PRN Gustavo Cartagena III, MD         Facility-Administered Medications Ordered in Other Encounters   Medication Dose Route Frequency Provider Last Rate Last Admin    0.9%  NaCl infusion   Intravenous Continuous Soni Bhatti MD   New Bag at 07/21/23 1116       FamHx:  Family History   Problem Relation Age of Onset    Hypertension Mother     Diabetes Father     Kidney disease Father     No Known Problems Sister     Heart disease Sister     No Known Problems Sister     No Known Problems Brother     No Known Problems Brother     Cataracts Maternal Aunt     No Known Problems Maternal Uncle     No Known Problems Paternal Aunt     No Known Problems Paternal Uncle     No Known Problems Maternal Grandmother     Diabetes Maternal Grandfather     No Known Problems Paternal Grandmother     No Known Problems Paternal Grandfather     No Known Problems Son     No Known Problems Son     No Known Problems Other     Breast cancer Neg Hx     Colon cancer Neg Hx     Cancer Neg Hx     Stroke Neg Hx     Amblyopia Neg Hx     Blindness Neg Hx     Glaucoma Neg Hx     Macular degeneration Neg Hx     Retinal detachment Neg Hx     Strabismus  Neg Hx     Thyroid disease Neg Hx        SocHx:  Social History     Socioeconomic History    Marital status:     Number of children: 2   Occupational History    Occupation: [employer]     Employer: [id number]   Tobacco Use    Smoking status: Never     Passive exposure: Never    Smokeless tobacco: Never   Substance and Sexual Activity    Alcohol use: No    Drug use: Yes     Types: Hydrocodone    Sexual activity: Not Currently     Social Determinants of Health     Financial Resource Strain: Low Risk  (9/28/2023)    Overall Financial Resource Strain (CARDIA)     Difficulty of Paying Living Expenses: Not hard at all   Food Insecurity: No Food Insecurity (9/28/2023)    Hunger Vital Sign     Worried About Running Out of Food in the Last Year: Never true     Ran Out of Food in the Last Year: Never true   Transportation Needs: No Transportation Needs (9/28/2023)    PRAPARE - Transportation     Lack of Transportation (Medical): No     Lack of Transportation (Non-Medical): No   Physical Activity: Inactive (9/28/2023)    Exercise Vital Sign     Days of Exercise per Week: 0 days     Minutes of Exercise per Session: 0 min   Stress: No Stress Concern Present (9/28/2023)    [employer] [place] [employer] - Occupational Stress Questionnaire     Feeling of Stress : Not at all   Social Connections: Moderately Integrated (9/28/2023)    Social Connection and Isolation Panel [NHANES]     Frequency of Communication with Friends and Family: More than three times a week     Frequency of Social Gatherings with Friends and Family: More than three times a week     Attends Yarsani Services: More than 4 times per year     Active Member of Clubs or Organizations: No     Attends Club or Organization Meetings: Never     Marital Status:    Housing Stability: Low Risk  (9/28/2023)    Housing Stability Vital Sign     Unable to Pay for Housing in the Last Year: No     Number of Places Lived in the Last Year: 1      "Unstable Housing in the Last Year: No           Review of Systems: see H&P       Physical Exam:  Vitals:   Vitals:    11/02/23 0746   BP: 113/73   Pulse: 77   Resp: 20   Temp: 98.7 °F (37.1 °C)       No intake/output data recorded.  No intake/output data recorded.    General: No apparent distress, seen during Dialysis   Neck: supple   Lungs: Unlabored breathing  Heart: RRR  Abdomen: n/a  Ext: n/a  Neurologic: awake-alert      Laboratories:    Recent Labs   Lab 11/02/23  0409   WBC 12.57   RBC 2.34*   HGB 7.4*   HCT 24.3*      *   MCH 31.6*   MCHC 30.5*       Recent Labs   Lab 11/01/23  1437 11/02/23  0409   CALCIUM 8.0* 7.5*   ALBUMIN 2.0*  --    PROT 5.8*  --     141   K 4.4 5.5*   CO2 24 17*    102   BUN 63* 71*   CREATININE 4.5* 4.8*   ALKPHOS 343*  --    ALT 30  --    AST 44*  --    BILITOT 0.7  --        No results for input(s): "COLORU", "CLARITYU", "SPECGRAV", "PHUR", "PROTEINUA", "GLUCOSEU", "BILIRUBINCON", "BLOODU", "WBCU", "RBCU", "BACTERIA", "MUCUS" in the last 24 hours.    Microbiology Results (last 7 days)       ** No results found for the last 168 hours. **              Diagnostic Tests:        Assessment:    59 y/o female with ESRD on HD admitted with:    - Severe (asymptomatic) anemia s/p transfusion  - Chronic combined HF  - PAD  - PAF  - Hx. AICD  - Hx. Pulmonary HTN  - Anemia of ckd  - HTN  - Hypoalbuminemia      Plan:    - Transfuse as needed  - f/u H&H  - Resume Dialysis today and Q TTS  - Epogen 3 x week  - Renal diet + protein supplements  - Wound care per Podiatry  - Discharge planning and other problems per admitting         "

## 2023-11-02 NOTE — PLAN OF CARE
Problem: Adjustment to Illness (Gastrointestinal Bleeding)  Goal: Optimal Coping with Acute Illness  Outcome: Adequate for Care Transition     Problem: Bleeding (Gastrointestinal Bleeding)  Goal: Hemostasis  Outcome: Adequate for Care Transition     Problem: Adult Inpatient Plan of Care  Goal: Plan of Care Review  Outcome: Adequate for Care Transition  Goal: Patient-Specific Goal (Individualized)  Outcome: Adequate for Care Transition  Goal: Absence of Hospital-Acquired Illness or Injury  Outcome: Adequate for Care Transition  Goal: Optimal Comfort and Wellbeing  Outcome: Adequate for Care Transition  Goal: Readiness for Transition of Care  Outcome: Adequate for Care Transition     Problem: Fall Injury Risk  Goal: Absence of Fall and Fall-Related Injury  Outcome: Adequate for Care Transition     Problem: Skin Injury Risk Increased  Goal: Skin Health and Integrity  Outcome: Adequate for Care Transition     Problem: Fatigue  Goal: Improved Activity Tolerance  Outcome: Adequate for Care Transition     Problem: Adjustment to Illness (Sepsis/Septic Shock)  Goal: Optimal Coping  Outcome: Adequate for Care Transition     Problem: Bleeding (Sepsis/Septic Shock)  Goal: Absence of Bleeding  Outcome: Adequate for Care Transition     Problem: Glycemic Control Impaired (Sepsis/Septic Shock)  Goal: Blood Glucose Level Within Desired Range  Outcome: Adequate for Care Transition     Problem: Infection Progression (Sepsis/Septic Shock)  Goal: Absence of Infection Signs and Symptoms  Outcome: Adequate for Care Transition     Problem: Nutrition Impaired (Sepsis/Septic Shock)  Goal: Optimal Nutrition Intake  Outcome: Adequate for Care Transition     Problem: Infection  Goal: Absence of Infection Signs and Symptoms  Outcome: Adequate for Care Transition     Problem: Impaired Wound Healing  Goal: Optimal Wound Healing  Outcome: Adequate for Care Transition     Problem: Device-Related Complication Risk (Hemodialysis)  Goal: Safe,  Effective Therapy Delivery  Outcome: Adequate for Care Transition     Problem: Hemodynamic Instability (Hemodialysis)  Goal: Effective Tissue Perfusion  Outcome: Adequate for Care Transition     Problem: Infection (Hemodialysis)  Goal: Absence of Infection Signs and Symptoms  Outcome: Adequate for Care Transition

## 2023-11-02 NOTE — PLAN OF CARE
TN talked with spouse Otis Bloom, says he plans to call [facility] and sign patient in again when she gets home.  All DME is still at the house.  Patient goes to [facility]  [address] [facility].  TN sent a secure chat to med surg nurse Lara this patient is clear for discharge from case management's viewpoint.   11/02/23 1402   Final Note   Assessment Type Final Discharge Note   What phone number can be called within the next 1-3 days to see how you are doing after discharge?   (see chart)   Hospital Resources/Appts/Education Provided Provided patient/caregiver with written discharge plan information;Appointments scheduled and added to AVS;Dilaysis schedule provided   Post-Acute Status   Coverage Medicare   Discharge Delays None known at this time

## 2023-11-06 ENCOUNTER — OFFICE VISIT (OUTPATIENT)
Dept: PODIATRY | Facility: CLINIC | Age: 58
End: 2023-11-06
Payer: MEDICARE

## 2023-11-06 VITALS
SYSTOLIC BLOOD PRESSURE: 121 MMHG | WEIGHT: 104.94 LBS | BODY MASS INDEX: 17.92 KG/M2 | DIASTOLIC BLOOD PRESSURE: 79 MMHG | HEART RATE: 90 BPM | HEIGHT: 64 IN

## 2023-11-06 DIAGNOSIS — I73.9 PAD (PERIPHERAL ARTERY DISEASE): ICD-10-CM

## 2023-11-06 DIAGNOSIS — L97.529 ISCHEMIC TOE ULCER, LEFT, WITH UNSPECIFIED SEVERITY: Primary | ICD-10-CM

## 2023-11-06 PROCEDURE — 99213 PR OFFICE/OUTPT VISIT, EST, LEVL III, 20-29 MIN: ICD-10-PCS | Mod: S$PBB,,, | Performed by: PODIATRIST

## 2023-11-06 PROCEDURE — 99213 OFFICE O/P EST LOW 20 MIN: CPT | Mod: PBBFAC,PO | Performed by: PODIATRIST

## 2023-11-06 PROCEDURE — 99999 PR PBB SHADOW E&M-EST. PATIENT-LVL III: CPT | Mod: PBBFAC,,, | Performed by: PODIATRIST

## 2023-11-06 PROCEDURE — 99213 OFFICE O/P EST LOW 20 MIN: CPT | Mod: S$PBB,,, | Performed by: PODIATRIST

## 2023-11-06 PROCEDURE — 99999 PR PBB SHADOW E&M-EST. PATIENT-LVL III: ICD-10-PCS | Mod: PBBFAC,,, | Performed by: PODIATRIST

## 2023-11-06 NOTE — HOSPITAL COURSE
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 Pt was called by outpatient dialysis center due to reported hgb of 5.6 with previous of 9.5 from the week prior. Pt had labs done at dialysis on 10/31 which she completed a full session. Pt is DNR and currently enrolled in hospice, but continues dialysis treatments. In ed hgb noted to be 5.9 and hemoccult positive. Pt given protonix and 1 unit prbc ordered prior to admission with GI and nephrology consultation. Pt hgb improved after 1 unit prbc. No evidence of bleeding noted and case discussed with GI who recommend outpatient f/u and no inpatient egd/colonoscopy indicated at this time. Pt with dialysis session prior to discharge. Feeling better. Plan discussed with pt and  at bedside who endorse understanding and referrals sent.

## 2023-11-06 NOTE — PROGRESS NOTES
Subjective:      Patient ID: Eva Bloom is a 58 y.o. female.    Chief Complaint: Wound Check    Ulcer/gangrene left 3rd toe (1st toe left healed).  Gradual onset, improving slowly over past 4 weeks, aggravated by increased weight bearing, shoe gear, pressure.  Betadine swabs, open toe shoes, angioplasty all helping.. Denies trauma, surgery.    7/27/2023 angioplasty successfuly        Review of Systems   Constitutional: Negative for chills, diaphoresis, fever, malaise/fatigue and night sweats.   Cardiovascular:  Positive for leg swelling. Negative for claudication, cyanosis and syncope.   Skin:  Positive for poor wound healing. Negative for color change, dry skin, nail changes, rash, suspicious lesions and unusual hair distribution.   Musculoskeletal:  Negative for falls, joint pain, joint swelling, muscle cramps, muscle weakness and stiffness.   Gastrointestinal:  Negative for constipation, diarrhea, nausea and vomiting.   Neurological:  Positive for sensory change. Negative for brief paralysis, disturbances in coordination, focal weakness, numbness, paresthesias and tremors.           Objective:      Physical Exam  Constitutional:       General: She is not in acute distress.     Appearance: She is well-developed. She is not diaphoretic.   Cardiovascular:      Pulses:           Dorsalis pedis pulses are 1+ on the right side and 1+ on the left side.        Posterior tibial pulses are 1+ on the right side and 1+ on the left side.      Comments: Capillary refill 3 seconds all toes/distal feet, all toes/both feet warm to touch.      Negative lymphadenopathy bilateral popliteal fossa and tarsal tunnel.      <2+ pitting lower extremity edema bilateral.    Musculoskeletal:      Right ankle: No swelling, deformity, ecchymosis or lacerations. Normal range of motion. Normal pulse.      Right Achilles Tendon: Normal. No defects. Manning's test negative.   Lymphadenopathy:      Lower Body: No right inguinal adenopathy.  No left inguinal adenopathy.      Comments: Negative lymphadenopathy bilateral popliteal fossa and tarsal tunnel.    Negative lymphangitic streaking bilateral feet/ankles/legs.   Skin:     General: Skin is warm and dry.      Capillary Refill: Capillary refill takes 2 to 3 seconds.      Coloration: Skin is not pale.      Findings: No abrasion, bruising, burn, ecchymosis, erythema, laceration, lesion or rash.      Nails: There is no clubbing.      Comments:   Focal area dorsal medial left 3rd (not 4th as in last note - error in that chart) toe pipj dry indurated gangrene demarcating  without open skin ulceration, drainage, pus, tracking, fluctuance, malodor, or cardinal signs infection.     Otherwise, Skin thin, shiny, atrophic, with decreased density and distribution of pedal hair bilateral, but without hyperpigmentation, daniele discoloration,  ulcers, masses, nodules or cords palpated bilateral feet and legs.    Neurological:      Mental Status: She is alert and oriented to person, place, and time.      Sensory: No sensory deficit.      Motor: No tremor, atrophy or abnormal muscle tone.      Gait: Gait normal.      Deep Tendon Reflexes:      Reflex Scores:       Patellar reflexes are 2+ on the right side and 2+ on the left side.       Achilles reflexes are 2+ on the right side and 2+ on the left side.     Comments: Negative tinel sign to percussion sural, superficial peroneal, deep peroneal, saphenous, and posterior tibial nerves right and left ankles and feet.     Psychiatric:         Behavior: Behavior is cooperative.             Assessment:       Encounter Diagnoses   Name Primary?    Ischemic toe ulcer, left, with unspecified severity Yes    PAD (peripheral artery disease)          Plan:       Eva was seen today for wound check.    Diagnoses and all orders for this visit:    Ischemic toe ulcer, left, with unspecified severity    PAD (peripheral artery disease)      I counseled the patient on her conditions, their  implications and medical management.    Cultures some purulent drainage from wound periphery.    Doxycycline - finish    Continue betadine swabs daily left 3rd toe.  Cover with band aid if desired.  Continue open toe shoe (surgical shoes bilateral today)    Heel offload boots all times stationary.    Keep all vascualr follow up.    Continue HH.    2 week, assessment/evaluation, sooner prn.          No follow-ups on file.

## 2023-11-06 NOTE — DISCHARGE SUMMARY
[facility] Medicine  Discharge Summary      Patient Name: Eva Bloom  MRN: [id number]  [facility]: [id number]  Patient Class: IP- Inpatient  Admission Date: 11/1/2023  Hospital Length of Stay: 1 days  Discharge Date and Time: 11/2/2023  4:21 PM  Attending Physician: No att. providers found   Discharging Provider: Gustavo Cartagena III, MD  Primary Care Provider: Sowmya Mccain MD    Primary Care Team: Networked reference to record PCT     HPI:   58F with pmh of esrd, afib on eliquis, rcc with metastatic disease to lung and pancreas, cardiomyopathy, htn who presented due to abnormal labs. Pt was called by outpatient dialysis center due to reported hgb of 5.6 with previous of 9.5 from the week prior. Pt had labs done at dialysis on 10/31 which she completed a full session. Pt denies hematochezia, melena, but does endorse generalized fatigue. Pt states if she had not been called she would not have come to the hospital. Pt is DNR and currently enrolled in hospice, but continues dialysis treatments. Pt denies alcohol, tobacco, or drug use. In ed hgb noted to be 5.9 and hemoccult positive. Pt given protonix and 1 unit prbc ordered prior to admission with GI and nephrology consultation.      * No surgery found *      Hospital Course:   58F with pmh of esrd, afib on eliquis, rcc with metastatic disease to lung and pancreas, cardiomyopathy, htn who presented due to abnormal labs. Pt was called by outpatient dialysis center due to reported hgb of 5.6 with previous of 9.5 from the week prior. Pt had labs done at dialysis on 10/31 which she completed a full session. Pt is DNR and currently enrolled in hospice, but continues dialysis treatments. In ed hgb noted to be 5.9 and hemoccult positive. Pt given protonix and 1 unit prbc ordered prior to admission with GI and nephrology consultation. Pt hgb improved after 1 unit prbc. No evidence of bleeding noted and case discussed with GI who recommend outpatient f/u  and no inpatient egd/colonoscopy indicated at this time. Pt with dialysis session prior to discharge. Feeling better. Plan discussed with pt and  at bedside who endorse understanding and referrals sent.        Goals of Care Treatment Preferences:  Code Status: DNR          What is most important right now is to focus on spending time at home, avoiding the hospital, remaining as independent as possible, quality of life, even if it means sacrificing a little time, improvement in condition but with limits to invasive therapies, comfort and QOL .  Accordingly, we have decided that the best plan to meet the patient's goals includes continuing with treatment, enrolling in hospice care.      Consults:     No new Assessment & Plan notes have been filed under this hospital service since the last note was generated.  Service: Hospital Medicine    Final Active Diagnoses:    Diagnosis Date Noted POA    PRINCIPAL PROBLEM:  Acute blood loss anemia [D62] 11/01/2023 Yes    PAD (peripheral artery disease) [I73.9] 11/01/2023 Yes    ACP (advance care planning) [Z71.89] 10/11/2023 Not Applicable    Severe protein-calorie malnutrition [E43] 10/11/2023 Yes    Body mass index (BMI) less than 19 [Z68.1] 09/28/2023 Not Applicable    Metastatic renal cell carcinoma to lung, unspecified laterality [C78.00, C64.9] 08/11/2023 Yes    Debility [R53.81] 11/04/2022 Yes    PAF (paroxysmal atrial fibrillation) [I48.0] 07/25/2022 Yes     Chronic    Anemia in ESRD (end-stage renal disease) [N18.6, D63.1]  Yes     Chronic    ESRD (end stage renal disease) on dialysis [N18.6, Z99.2]  Not Applicable     Chronic    Cardiomyopathy, nonischemic [I42.8] 07/21/2017 Yes     Chronic    Essential hypertension [I10] 09/23/2015 Yes     Chronic      Problems Resolved During this Admission:       Discharged Condition: fair    Disposition: Home or Self Care    Follow Up:   Follow-up Information     Sowmya Mccain MD Follow up.    Specialties:  Internal Medicine, Pediatrics  Contact information:  [address] [facility] [address]  [phone]             [facility] Follow up on 11/4/2023.    Why: TTS hemodialysis to resume Saturday 11/4/2023  Contact information:  [address] [facility] [address]  [phone]           Hemoc and GI referrals placed. Call.    Why: , hemoc, GI referral  Contact information:  PATIENT TO BE CALLED WITH  APPOINTMENTS  [phone]                     Patient Instructions:      Ambulatory referral/consult to Cardiology   Standing Status: Future   Referral Priority: Routine Referral Type: Consultation   Referral Reason: Specialty Services Required   Requested Specialty: Cardiology   Number of Visits Requested: 1     Ambulatory referral/consult to Gastroenterology   Standing Status: Future   Referral Priority: Routine Referral Type: Consultation   Referral Reason: Specialty Services Required   Requested Specialty: Gastroenterology   Number of Visits Requested: 1     Ambulatory referral/consult to Hematology / Oncology   Standing Status: Future   Referral Priority: Routine Referral Type: Consultation   Referral Reason: Specialty Services Required   Requested Specialty: Hematology and Oncology   Number of Visits Requested: 1     Notify your health care provider if you experience any of the following:  increased confusion or weakness     Notify your health care provider if you experience any of the following:  persistent dizziness, light-headedness, or visual disturbances     Notify your health care provider if you experience any of the following:  worsening rash     Notify your health care provider if you experience any of the following:  severe persistent headache     Notify your health care provider if you experience any of the following:  redness, tenderness, or signs of infection (pain, swelling, redness, odor or green/yellow discharge around incision site)     Notify your health care  provider if you experience any of the following:  difficulty breathing or increased cough     Notify your health care provider if you experience any of the following:  severe uncontrolled pain     Notify your health care provider if you experience any of the following:  persistent nausea and vomiting or diarrhea     Notify your health care provider if you experience any of the following:  temperature >100.4     Activity as tolerated       Significant Diagnostic Studies: Labs: All labs within the past 24 hours have been reviewed    Pending Diagnostic Studies:     None         Medications:  Reconciled Home Medications:      Medication List      START taking these medications    pantoprazole 40 MG tablet  Commonly known as: PROTONIX  Take 1 tablet (40 mg total) by mouth 2 (two) times daily.        CONTINUE taking these medications    acetaminophen 500 MG tablet  Commonly known as: TYLENOL  Take 500 mg by mouth every 6 (six) hours as needed for Pain.     apixaban 2.5 mg Tab  Commonly known as: ELIQUIS  Take 1 tablet (2.5 mg total) by mouth 2 (two) times daily.     CABOMETYX 60 mg Tab  Generic drug: cabozantinib  TAKE ONE TABLET BY MOUTH ONCE DAILY AT THE SAME TIME ON AN EMPTY STOMACH AT LEAST 1 HOUR BEFORE OR 2 HOURS AFTER EATING. AVOID GRAPEFRUIT PRODUCTS     cloNIDine 0.3 mg/24 hr td ptwk 0.3 mg/24 hr  Commonly known as: CATAPRES  Place 1 patch onto the skin every 7 days.     cyclobenzaprine 5 MG tablet  Commonly known as: FLEXERIL  TAKE 1 TABLET BY MOUTH DAILY AS NEEDED FOR MUSCLE SPASMS.     ENTRESTO 49-51 mg per tablet  Generic drug: sacubitriL-valsartan  TAKE 1 TABLET BY MOUTH TWICE A DAY     fluticasone propionate 50 mcg/actuation nasal spray  Commonly known as: FLONASE  1 spray (50 mcg total) by Each Nostril route 2 (two) times daily as needed (ear fullness).     FosrenoL 1000 MG chewable tablet  Generic drug: lanthanum  Take 1 tablet by mouth.     HYDROcodone-acetaminophen 5-325 mg per tablet  Commonly known  as: NORCO  Take 1 tablet by mouth every 6 (six) hours as needed for Pain.     levothyroxine 75 MCG tablet  Commonly known as: SYNTHROID  Take 1 tablet (75 mcg total) by mouth once daily.     LIDOcaine-prilocaine cream  Commonly known as: EMLA  APPLY ATLEAST 30 MINUTES BEFORE TREATMENT 3 TIMES A WEEK     metoprolol succinate 100 MG 24 hr tablet  Commonly known as: TOPROL-XL  Take 1/2 tablet (50mg) once daily     naloxone 1 mg/mL injection  Commonly known as: NARCAN  2 mg (1 mg per nostril) by Nasal route as needed for opioid overdose; may repeat in 3 to 5 minutes if not effective. Call 911     ONE-A-DAY WOMEN'S 50 PLUS 400-20 mcg Tab  Generic drug: mv,Ca,min-folic acid-vit K1  Take 1 tablet by mouth once daily.     RETACRIT INJ  Epoetin david - epbx (Retacrit)        STOP taking these medications    aspirin 81 MG Chew     hydrALAZINE 100 MG tablet  Commonly known as: APRESOLINE            Indwelling Lines/Drains at time of discharge:   Lines/Drains/Airways     Central Venous Catheter Line  Duration                Hemodialysis Catheter left subclavian -- days                Time spent on the discharge of patient: >35 minutes         Gustavo Cartagena III, MD  Department of Hospital Medicine  [facility] [place] [facility]

## 2023-11-07 ENCOUNTER — TELEPHONE (OUTPATIENT)
Dept: GASTROENTEROLOGY | Facility: CLINIC | Age: 58
End: 2023-11-07
Payer: MEDICARE

## 2023-11-07 NOTE — TELEPHONE ENCOUNTER
----- Message from Diana Christian NP sent at 11/2/2023 10:14 AM CDT -----  Regarding: GI appt  Good Morning      Would you please schedule this patient for an appt with GI Clinic , for Acute on chronic anemia, w/o c/o overt bleeding, appt requested by hospital medicine , once discharged from inpatient. Any available provider is fine.          Thanks,  Diana Christian NP

## 2023-11-07 NOTE — PROGRESS NOTES
"  Subjective:       Patient ID: Eva Bloom is a 58 y.o. female.    Chief Complaint: RCC    HPI     58 y.o.female, with metastatic RCC, here for f/u.  Recent hospitalization for anemia, Hbg  5.9.   Pt has had decline in PS, now requiring assist with all ADLs.      Off cabozantinib, which was causing side effects.   Pt still is on ESRD with HD every TTS.     Continues to have weight loss.  Now also with LE ulcerations, contemplating a foot amputation.  Also contemplating hospice.       ECOG 2+. She is accompanied with her . She presents in a wheelchair, multiple bandages on LE.        Oncologic History (From Chart and Patient):  Eva Bloom is a 58 y.o.female with ESRD on HD who was found to have two L renal masses. She underwent L lap nephrectomy on 1/12/16. 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 Margins were negative.  Shehealed from surgery well, but then developed a large ovarian mass.  This was removed by gyn along with multiple sites of metastatic disease in the pelvis.  Path was c/w recurrence of RCC.     11/20/16 Pelvic ultrasound reveals "Large heterogeneous cystic and solid mass which appears to arise from the right ovary with worrisome imaging features for malignancy.  Differential diagnosis includes malignant tumors such as serous or mucinous cystadenocarcinoma.  It is important to note that the patient is at risk for ovarian torsion due to the size of the mass.Multiple uterine fibroids are identified with the largest in the uterine fundus."    11/22/16 pathology reveals "FINAL PATHOLOGIC DIAGNOSIS-RIGHT OVARY AND FALLOPIAN TUBE, RIGHT SALPINGO-OOPHORECTOMY:  -Positive for malignancy, high grade carcinoma morphologically and immunohistochemically consistent with metastasis from the patient's known renal cell carcinoma"    Review of Systems   Constitutional:  " Positive for activity change, appetite change, fatigue and unexpected weight change. Negative for chills and fever.   HENT:  Negative for congestion, hearing loss, mouth sores, postnasal drip, sore throat, tinnitus and voice change.    Eyes:  Negative for pain and visual disturbance.   Respiratory:  Negative for cough, shortness of breath and wheezing.    Cardiovascular:  Negative for chest pain, palpitations and leg swelling.   Gastrointestinal:  Negative for abdominal pain, constipation, diarrhea, nausea and vomiting.   Endocrine: Negative for cold intolerance and heat intolerance.   Genitourinary:  Negative for difficulty urinating, dyspareunia, dysuria, frequency, menstrual problem, urgency, vaginal bleeding, vaginal discharge and vaginal pain.   Musculoskeletal:  Negative for arthralgias and myalgias.        LE ulcerations and pain    Skin:  Negative for color change, rash and wound.   Allergic/Immunologic: Negative for environmental allergies and food allergies.   Neurological:  Negative for weakness, numbness and headaches.   Hematological:  Negative for adenopathy. Does not bruise/bleed easily.   Psychiatric/Behavioral:  Negative for agitation, confusion, hallucinations and sleep disturbance. The patient is not nervous/anxious.    All other systems reviewed and are negative.      Allergies:  Review of patient's allergies indicates:   Allergen Reactions    Allopurinol      Other reaction(s): abnormal transaminases       Medications:  Current Outpatient Medications   Medication Sig Dispense Refill    acetaminophen (TYLENOL) 500 MG tablet Take 500 mg by mouth every 6 (six) hours as needed for Pain.      apixaban (ELIQUIS) 2.5 mg Tab Take 1 tablet (2.5 mg total) by mouth 2 (two) times daily. 60 tablet 11    cabozantinib (CABOMETYX) 60 mg Tab TAKE ONE TABLET BY MOUTH ONCE DAILY AT THE SAME TIME ON AN EMPTY STOMACH AT LEAST 1 HOUR BEFORE OR 2 HOURS AFTER EATING. AVOID GRAPEFRUIT PRODUCTS 30 tablet 4    cloNIDine  0.3 mg/24 hr td ptwk (CATAPRES) 0.3 mg/24 hr Place 1 patch onto the skin every 7 days. 4 patch 11    cyclobenzaprine (FLEXERIL) 5 MG tablet TAKE 1 TABLET BY MOUTH DAILY AS NEEDED FOR MUSCLE SPASMS. 30 tablet 1    ENTRESTO 49-51 mg per tablet TAKE 1 TABLET BY MOUTH TWICE A  tablet 3    epoetin david-epbx (RETACRIT INJ) Epoetin david - epbx (Retacrit)      fluticasone propionate (FLONASE) 50 mcg/actuation nasal spray 1 spray (50 mcg total) by Each Nostril route 2 (two) times daily as needed (ear fullness). 16 g 2    HYDROcodone-acetaminophen (NORCO) 5-325 mg per tablet Take 1 tablet by mouth every 6 (six) hours as needed for Pain. 10 tablet 0    lanthanum (FOSRENOL) 1000 MG chewable tablet Take 1 tablet by mouth.      levothyroxine (SYNTHROID) 75 MCG tablet Take 1 tablet (75 mcg total) by mouth once daily. 30 tablet 11    lidocaine-prilocaine (EMLA) cream APPLY ATLEAST 30 MINUTES BEFORE TREATMENT 3 TIMES A WEEK 30 g 11    metoprolol succinate (TOPROL-XL) 100 MG 24 hr tablet Take 1/2 tablet (50mg) once daily 90 tablet 3    mv,Ca,min-folic acid-vit K1 (ONE-A-DAY WOMEN'S 50 PLUS) 400-20 mcg Tab Take 1 tablet by mouth once daily.      naloxone (NARCAN) 1 mg/mL injection 2 mg (1 mg per nostril) by Nasal route as needed for opioid overdose; may repeat in 3 to 5 minutes if not effective. Call 911 2 mL 11    pantoprazole (PROTONIX) 40 MG tablet Take 1 tablet (40 mg total) by mouth 2 (two) times daily. 60 tablet 11     No current facility-administered medications for this visit.     Facility-Administered Medications Ordered in Other Visits   Medication Dose Route Frequency Provider Last Rate Last Admin    0.9%  NaCl infusion   Intravenous Continuous Soni Bhatti MD   New Bag at 07/21/23 1116       PMH:  Past Medical History:   Diagnosis Date    Anemia     Anticoagulant long-term use     Atrial fibrillation     Bronchitis 03/01/2017    Cancer 2016    kidney cancer    CHF (congestive heart failure), NYHA class II,  chronic, systolic     CMV (cytomegalovirus) antibody positive     Encounter for blood transfusion     ESRD (end stage renal disease)     Essential hypertension 09/23/2015    H/O herpes simplex type 2 infection     Herpes simplex type 1 antibody positive     History of kidney cancer     s/p left nephrectomy 1/2016    Hyperparathyroidism, unspecified     Hyperuricemia without signs of inflammatory arthritis and tophaceous disease     Kidney stones     LGSIL (low grade squamous intraepithelial dysplasia)     Myocardiopathy 07/21/2017    Prediabetes     Proteinuria     Renal disorder     Thyroid nodule     Urate nephropathy        PSH:  Past Surgical History:   Procedure Laterality Date    BREAST CYST EXCISION      COLONOSCOPY N/A 11/12/2015    COLONOSCOPY N/A 3/12/2021    Procedure: COLONOSCOPY;  Surgeon: Brendon Lanier MD;  Location: [facility] ENDO;  Service: Endoscopy;  Laterality: N/A;  covid test 3/9, labs prior, prep instr mailed -ml    EXCISION OF ARTERIOVENOUS FISTULA Left 9/27/2023    Procedure: EXCISION, AV FISTULA;  Surgeon: FARIDEH Muñoz III, MD;  Location: [facility] OR [address] [place] [address];  Service: Vascular;  Laterality: Left;  LUE AV graft excision    INSERTION OF BIVENTRICULAR IMPLANTABLE CARDIOVERTER-DEFIBRILLATOR (ICD)  04/2021    INSERTION OF TUNNELED CENTRAL VENOUS CATHETER (CVC) WITH SUBCUTANEOUS PORT N/A 1/25/2023    Procedure: INSERTION, DUAL LUMEN CATHETER WITH PORT, WITH IMAGING GUIDANCE;  Surgeon: FARIDEH Muñoz III, MD;  Location: [facility] OR [address] [place] [address];  Service: Peripheral Vascular;  Laterality: N/A;  possinble permcath placment    NEPHRECTOMY-LAPAROSCOPIC Left 01/12/2016    PERCUTANEOUS TRANSLUMINAL ANGIOPLASTY OF ARTERIOVENOUS FISTULA Left 4/19/2023    Procedure: PTA, AV FISTULA;  Surgeon: FARIDEH Muñoz III, MD;  Location: [facility] CATH LAB;  Service: Peripheral Vascular;  Laterality: Left;    PERITONEAL CATHETER INSERTION      Permacath insertion  01/12/2017    PLACEMENT OF ARTERIOVENOUS GRAFT  12/28/2022     Procedure: INSERTION, GRAFT, ARTERIOVENOUS;  Surgeon: FARIDEH Muñoz III, MD;  Location: [facility] OR [facility] FLR;  Service: Peripheral Vascular;;    REMOVAL, GRAFT, ARTERIOVENOUS, UPPER EXTREMITY Left 1/25/2023    Procedure: REMOVAL, GRAFT, ARTERIOVENOUS, UPPER EXTREMITY;  Surgeon: FARIDEH Muñoz III, MD;  [facility];  Service: Peripheral Vascular;  Laterality: Left;    REVISION OF ARTERIOVENOUS FISTULA Left 5/1/2023    Procedure: REVISION, AV FISTULA;  Surgeon: FARIDEH Muñoz III, MD;  [facility];  Service: Peripheral Vascular;  Laterality: Left;  LUE AVG revision    REVISION OF PROCEDURE INVOLVING ARTERIOVENOUS GRAFT Left 12/28/2022    Procedure: REVISION, PROCEDURE INVOLVING ARTERIOVENOUS GRAFT;  Surgeon: FARIDEH Muñoz III, MD;  Location: [facility] OR [address] [place] [address];  Service: Peripheral Vascular;  Laterality: Left;    REVISION OF PROCEDURE INVOLVING ARTERIOVENOUS GRAFT Left 7/21/2023    Procedure: LEFT ARM ARTERIOVENOUS GRAFT EXCISION  AND LIGATION;  Surgeon: Obi Werner MD;  Location: [facility];  Service: Vascular;  Laterality: Left;  Left AVG excision and revision with interposition    SALPINGOOPHORECTOMY Right 2016    KJB---DAVINCI    TONSILLECTOMY      TUBAL LIGATION         FamHx:  Family History   Problem Relation Age of Onset    Hypertension Mother     Diabetes Father     Kidney disease Father     No Known Problems Sister     Heart disease Sister     No Known Problems Sister     No Known Problems Brother     No Known Problems Brother     Cataracts Maternal Aunt     No Known Problems Maternal Uncle     No Known Problems Paternal Aunt     No Known Problems Paternal Uncle     No Known Problems Maternal Grandmother     Diabetes Maternal Grandfather     No Known Problems Paternal Grandmother     No Known Problems Paternal Grandfather     No Known Problems Son     No Known Problems Son     No Known Problems Other     Breast cancer Neg Hx     Colon cancer Neg Hx     Cancer Neg Hx      Stroke Neg Hx     Amblyopia Neg Hx     Blindness Neg Hx     Glaucoma Neg Hx     Macular degeneration Neg Hx     Retinal detachment Neg Hx     Strabismus Neg Hx     Thyroid disease Neg Hx        SocHx:  Social History     Socioeconomic History    Marital status:     Number of children: 2   Occupational History    Occupation: [employer]     Employer: [id number]   Tobacco Use    Smoking status: Never     Passive exposure: Never    Smokeless tobacco: Never   Substance and Sexual Activity    Alcohol use: No    Drug use: Yes     Types: Hydrocodone    Sexual activity: Not Currently     Social Determinants of Health     Financial Resource Strain: Low Risk  (9/28/2023)    Overall Financial Resource Strain (CARDIA)     Difficulty of Paying Living Expenses: Not hard at all   Food Insecurity: No Food Insecurity (9/28/2023)    Hunger Vital Sign     Worried About Running Out of Food in the Last Year: Never true     Ran Out of Food in the Last Year: Never true   Transportation Needs: No Transportation Needs (9/28/2023)    PRAPARE - Transportation     Lack of Transportation (Medical): No     Lack of Transportation (Non-Medical): No   Physical Activity: Inactive (9/28/2023)    Exercise Vital Sign     Days of Exercise per Week: 0 days     Minutes of Exercise per Session: 0 min   Stress: No Stress Concern Present (9/28/2023)    [place] [employer] - Occupational Stress Questionnaire     Feeling of Stress : Not at all   Social Connections: Moderately Integrated (9/28/2023)    Social Connection and Isolation Panel [NHANES]     Frequency of Communication with Friends and Family: More than three times a week     Frequency of Social Gatherings with Friends and Family: More than three times a week     Attends Church Services: More than 4 times per year     Active Member of Clubs or Organizations: No     Attends Club or Organization Meetings: Never     Marital Status:    Housing Stability: Low Risk   (9/28/2023)    Housing Stability Vital Sign     Unable to Pay for Housing in the Last Year: No     Number of Places Lived in the Last Year: 1     Unstable Housing in the Last Year: No         Objective:     Vitals:    11/08/23 0816   BP: 132/74   Pulse: 94   Resp: 16   Temp: 97.8 °F (36.6 °C)           Physical Exam  Vitals and nursing note reviewed.   Constitutional:       General: She is not in acute distress.     Appearance: She is well-developed.      Comments: Thin appearance, in wheelchair   HENT:      Head: Normocephalic and atraumatic.      Nose: Nose normal.      Mouth/Throat:      Pharynx: No oropharyngeal exudate.   Eyes:      General:         Right eye: No discharge.         Left eye: No discharge.      Conjunctiva/sclera: Conjunctivae normal.      Pupils: Pupils are equal, round, and reactive to light.   Neck:      Trachea: No tracheal deviation.   Cardiovascular:      Comments: No swelling to bilateral lower extremities.   Musculoskeletal:         General: No tenderness. Normal range of motion.      Comments:      Skin:     General: Skin is warm and dry.      Coloration: Skin is not pale.      Findings: No erythema or rash.      Comments: Ulcerations on LE   Neurological:      Mental Status: She is alert and oriented to person, place, and time.   Psychiatric:         Behavior: Behavior normal.         Thought Content: Thought content normal.        LABS:  WBC   Date Value Ref Range Status   11/02/2023 11.32 3.90 - 12.70 K/uL Final     Hemoglobin   Date Value Ref Range Status   11/02/2023 7.4 (L) 12.0 - 16.0 g/dL Final     POC Hematocrit   Date Value Ref Range Status   10/10/2023 32 (L) 36 - 54 %PCV Final     Hematocrit   Date Value Ref Range Status   11/02/2023 25.5 (L) 37.0 - 48.5 % Final     Platelets   Date Value Ref Range Status   11/02/2023 277 150 - 450 K/uL Final       Chemistry        Component Value Date/Time     11/02/2023 0409    K 5.5 (H) 11/02/2023 0409     11/02/2023 0409     CO2 17 (L) 11/02/2023 0409    BUN 71 (H) 11/02/2023 0409    CREATININE 4.8 (H) 11/02/2023 0409    GLU 61 (L) 11/02/2023 0409        Component Value Date/Time    CALCIUM 7.5 (L) 11/02/2023 0409    ALKPHOS 343 (H) 11/01/2023 1437    AST 44 (H) 11/01/2023 1437    ALT 30 11/01/2023 1437    BILITOT 0.7 11/01/2023 1437        X-Ray Chest AP Portable  Narrative: EXAMINATION:  XR CHEST AP PORTABLE    CLINICAL HISTORY:  Anemia, unspecified    TECHNIQUE:  Single frontal view of the chest was performed.    COMPARISON:  10/10/2023    FINDINGS:  Cardiac silhouette is enlarged similar to the prior study.  The left central venous catheter with tip overlying the right atrium, similar to the prior study.    Cardiac defibrillator device on the left which obscures the left costophrenic angle.  Mild diffuse nonspecific interstitial changes.  Mild edema is a consideration.  No mass or consolidation.    No acute osseous abnormality.  Impression: Cardiomegaly with mild nonspecific interstitial changes.  Mild edema is a consideration.  Follow-up recommended.    Electronically signed by: Gael Andres  Date:    11/01/2023  Time:    15:11      Assessment:       1. Metastatic renal cell carcinoma to intra-abdominal site    2. Metastatic renal cell carcinoma, left            Plan:       Metastatic Renal Cell Carcinoma:    Pt has had SD for long time on [place], and most recent scans were largely stable, however, no longer able to lolita therapy due to AEs and LE infection/wounds.      Now with failure to thrive/declining PS with multiple co-morbidities (HD/ESRD, etc).      Pt and fly are interested in hospice which I agree with.  [facility] with [facility].       Tramadol PRN LE pain.      RTC PRN only    Patient is in agreement with the proposed treatment plan. All questions were answered to the patient's satisfaction. Pt knows to call clinic if anything is needed.      More than 40 mins total time were spent during this  encounter.      Ben Rivera M.D., M.S., F.A.C.P.  Hematology/Oncology Attending  [facility]

## 2023-11-08 ENCOUNTER — OFFICE VISIT (OUTPATIENT)
Dept: HEMATOLOGY/ONCOLOGY | Facility: CLINIC | Age: 58
End: 2023-11-08
Payer: COMMERCIAL

## 2023-11-08 ENCOUNTER — LAB VISIT (OUTPATIENT)
Dept: LAB | Facility: HOSPITAL | Age: 58
End: 2023-11-08
Attending: INTERNAL MEDICINE
Payer: COMMERCIAL

## 2023-11-08 VITALS
BODY MASS INDEX: 17.75 KG/M2 | OXYGEN SATURATION: 99 % | HEIGHT: 64 IN | TEMPERATURE: 98 F | SYSTOLIC BLOOD PRESSURE: 132 MMHG | HEART RATE: 94 BPM | WEIGHT: 104 LBS | RESPIRATION RATE: 16 BRPM | DIASTOLIC BLOOD PRESSURE: 74 MMHG

## 2023-11-08 DIAGNOSIS — R93.89 ABNORMAL FINDINGS ON DIAGNOSTIC IMAGING OF OTHER SPECIFIED BODY STRUCTURES: ICD-10-CM

## 2023-11-08 DIAGNOSIS — C64.9 METASTATIC RENAL CELL CARCINOMA, UNSPECIFIED LATERALITY: ICD-10-CM

## 2023-11-08 DIAGNOSIS — C64.9 METASTATIC RENAL CELL CARCINOMA TO INTRA-ABDOMINAL SITE: Primary | ICD-10-CM

## 2023-11-08 DIAGNOSIS — C79.89 METASTATIC RENAL CELL CARCINOMA TO INTRA-ABDOMINAL SITE: Primary | ICD-10-CM

## 2023-11-08 DIAGNOSIS — C64.2 METASTATIC RENAL CELL CARCINOMA, LEFT: ICD-10-CM

## 2023-11-08 LAB
ALBUMIN SERPL BCP-MCNC: 2 G/DL (ref 3.5–5.2)
ALP SERPL-CCNC: 290 U/L (ref 55–135)
ALT SERPL W/O P-5'-P-CCNC: 18 U/L (ref 10–44)
ANION GAP SERPL CALC-SCNC: 11 MMOL/L (ref 8–16)
AST SERPL-CCNC: 25 U/L (ref 10–40)
BASOPHILS # BLD AUTO: 0.26 K/UL (ref 0–0.2)
BASOPHILS NFR BLD: 2.6 % (ref 0–1.9)
BILIRUB SERPL-MCNC: 0.9 MG/DL (ref 0.1–1)
BUN SERPL-MCNC: 46 MG/DL (ref 6–20)
CALCIUM SERPL-MCNC: 9.6 MG/DL (ref 8.7–10.5)
CHLORIDE SERPL-SCNC: 104 MMOL/L (ref 95–110)
CO2 SERPL-SCNC: 27 MMOL/L (ref 23–29)
CREAT SERPL-MCNC: 4 MG/DL (ref 0.5–1.4)
DIFFERENTIAL METHOD: ABNORMAL
EOSINOPHIL # BLD AUTO: 0.6 K/UL (ref 0–0.5)
EOSINOPHIL NFR BLD: 6.3 % (ref 0–8)
ERYTHROCYTE [DISTWIDTH] IN BLOOD BY AUTOMATED COUNT: 20.5 % (ref 11.5–14.5)
EST. GFR  (NO RACE VARIABLE): 12.3 ML/MIN/1.73 M^2
GLUCOSE SERPL-MCNC: 133 MG/DL (ref 70–110)
HCT VFR BLD AUTO: 26.7 % (ref 37–48.5)
HGB BLD-MCNC: 7.9 G/DL (ref 12–16)
IMM GRANULOCYTES # BLD AUTO: 0.15 K/UL (ref 0–0.04)
IMM GRANULOCYTES NFR BLD AUTO: 1.5 % (ref 0–0.5)
LYMPHOCYTES # BLD AUTO: 1.6 K/UL (ref 1–4.8)
LYMPHOCYTES NFR BLD: 15.9 % (ref 18–48)
MCH RBC QN AUTO: 32.1 PG (ref 27–31)
MCHC RBC AUTO-ENTMCNC: 29.6 G/DL (ref 32–36)
MCV RBC AUTO: 109 FL (ref 82–98)
MONOCYTES # BLD AUTO: 1.3 K/UL (ref 0.3–1)
MONOCYTES NFR BLD: 13.1 % (ref 4–15)
NEUTROPHILS # BLD AUTO: 6.2 K/UL (ref 1.8–7.7)
NEUTROPHILS NFR BLD: 60.6 % (ref 38–73)
NRBC BLD-RTO: 1 /100 WBC
PLATELET # BLD AUTO: 412 K/UL (ref 150–450)
PMV BLD AUTO: 9.8 FL (ref 9.2–12.9)
POTASSIUM SERPL-SCNC: 4.6 MMOL/L (ref 3.5–5.1)
PROT SERPL-MCNC: 5.9 G/DL (ref 6–8.4)
RBC # BLD AUTO: 2.46 M/UL (ref 4–5.4)
SODIUM SERPL-SCNC: 142 MMOL/L (ref 136–145)
T4 FREE SERPL-MCNC: 0.99 NG/DL (ref 0.71–1.51)
TSH SERPL DL<=0.005 MIU/L-ACNC: 9.6 UIU/ML (ref 0.4–4)
WBC # BLD AUTO: 10.19 K/UL (ref 3.9–12.7)

## 2023-11-08 PROCEDURE — 99999 PR PBB SHADOW E&M-EST. PATIENT-LVL V: ICD-10-PCS | Mod: PBBFAC,,, | Performed by: INTERNAL MEDICINE

## 2023-11-08 PROCEDURE — 99215 PR OFFICE/OUTPT VISIT, EST, LEVL V, 40-54 MIN: ICD-10-PCS | Mod: GV,S$GLB,, | Performed by: INTERNAL MEDICINE

## 2023-11-08 PROCEDURE — 80053 COMPREHEN METABOLIC PANEL: CPT | Performed by: INTERNAL MEDICINE

## 2023-11-08 PROCEDURE — 84439 ASSAY OF FREE THYROXINE: CPT | Performed by: INTERNAL MEDICINE

## 2023-11-08 PROCEDURE — 85025 COMPLETE CBC W/AUTO DIFF WBC: CPT | Performed by: INTERNAL MEDICINE

## 2023-11-08 PROCEDURE — 99215 OFFICE O/P EST HI 40 MIN: CPT | Mod: PBBFAC | Performed by: INTERNAL MEDICINE

## 2023-11-08 PROCEDURE — 99215 OFFICE O/P EST HI 40 MIN: CPT | Mod: GV,S$GLB,, | Performed by: INTERNAL MEDICINE

## 2023-11-08 PROCEDURE — 99999 PR PBB SHADOW E&M-EST. PATIENT-LVL V: CPT | Mod: PBBFAC,,, | Performed by: INTERNAL MEDICINE

## 2023-11-08 PROCEDURE — 84443 ASSAY THYROID STIM HORMONE: CPT | Performed by: INTERNAL MEDICINE

## 2023-11-08 RX ORDER — IPRATROPIUM BROMIDE AND ALBUTEROL SULFATE 2.5; .5 MG/3ML; MG/3ML
SOLUTION RESPIRATORY (INHALATION)
COMMUNITY
Start: 2023-10-18

## 2023-11-08 RX ORDER — MIRTAZAPINE 15 MG/1
15 TABLET, FILM COATED ORAL NIGHTLY
COMMUNITY
Start: 2023-10-19 | End: 2024-01-11 | Stop reason: ALTCHOICE

## 2023-11-08 RX ORDER — BISACODYL 10 MG
10 SUPPOSITORY, RECTAL RECTAL
COMMUNITY
Start: 2023-10-18

## 2023-11-08 RX ORDER — HYOSCYAMINE SULFATE 0.12 MG/1
TABLET SUBLINGUAL
COMMUNITY
Start: 2023-10-18

## 2023-11-08 RX ORDER — LORAZEPAM 2 MG/ML
SOLUTION, CONCENTRATE ORAL
COMMUNITY
Start: 2023-10-18

## 2023-11-08 RX ORDER — TRAMADOL HYDROCHLORIDE 50 MG/1
50 TABLET ORAL EVERY 12 HOURS PRN
Qty: 30 EACH | Refills: 1 | Status: SHIPPED | OUTPATIENT
Start: 2023-11-08

## 2023-11-08 RX ORDER — NYSTATIN 100000 U/G
CREAM TOPICAL
COMMUNITY
Start: 2023-10-23

## 2023-11-08 RX ORDER — ONDANSETRON 4 MG/1
TABLET, ORALLY DISINTEGRATING ORAL
COMMUNITY
Start: 2023-10-18

## 2023-11-08 RX ORDER — MORPHINE SULFATE 20 MG/ML
SOLUTION ORAL
Status: ON HOLD | COMMUNITY
Start: 2023-10-18 | End: 2024-01-05 | Stop reason: HOSPADM

## 2023-11-27 ENCOUNTER — OFFICE VISIT (OUTPATIENT)
Dept: PODIATRY | Facility: CLINIC | Age: 58
End: 2023-11-27
Payer: COMMERCIAL

## 2023-11-27 ENCOUNTER — TELEPHONE (OUTPATIENT)
Dept: FAMILY MEDICINE | Facility: CLINIC | Age: 58
End: 2023-11-27
Payer: MEDICARE

## 2023-11-27 VITALS
HEART RATE: 98 BPM | BODY MASS INDEX: 17.75 KG/M2 | WEIGHT: 104 LBS | HEIGHT: 64 IN | DIASTOLIC BLOOD PRESSURE: 94 MMHG | SYSTOLIC BLOOD PRESSURE: 142 MMHG

## 2023-11-27 DIAGNOSIS — Z12.31 VISIT FOR SCREENING MAMMOGRAM: Primary | ICD-10-CM

## 2023-11-27 DIAGNOSIS — L97.529 ISCHEMIC TOE ULCER, LEFT, WITH UNSPECIFIED SEVERITY: Primary | ICD-10-CM

## 2023-11-27 PROCEDURE — 99999 PR PBB SHADOW E&M-EST. PATIENT-LVL IV: CPT | Mod: PBBFAC,,, | Performed by: PODIATRIST

## 2023-11-27 PROCEDURE — 99214 OFFICE O/P EST MOD 30 MIN: CPT | Mod: PBBFAC,PO | Performed by: PODIATRIST

## 2023-11-27 PROCEDURE — 99999 PR PBB SHADOW E&M-EST. PATIENT-LVL IV: ICD-10-PCS | Mod: PBBFAC,,, | Performed by: PODIATRIST

## 2023-11-27 NOTE — TELEPHONE ENCOUNTER
----- Message from Divya Gross sent at 11/27/2023  7:45 AM CST -----  Pt received a letter reminder her it is time for her to have a mammogram . Pt needs a order from Dr Mccain for this to be done. Cell is [phone] or [place]  [phone]

## 2023-11-27 NOTE — PROGRESS NOTES
Subjective:      Patient ID: Eva Bloom is a 58 y.o. female.    Chief Complaint: Wound Check    Ulcer/gangrene left 3rd toe (1st toe left healed, pip area 3rd toe healed).  Gradual onset, improving slowly over past 8 weeks, aggravated by increased weight bearing, shoe gear, pressure.  Betadine swabs, open toe shoes, angioplasty all helping.. Denies trauma, surgery.    7/27/2023 angioplasty successfuly        Review of Systems   Constitutional: Negative for chills, diaphoresis, fever, malaise/fatigue and night sweats.   Cardiovascular:  Positive for leg swelling. Negative for claudication, cyanosis and syncope.   Skin:  Positive for poor wound healing. Negative for color change, dry skin, nail changes, rash, suspicious lesions and unusual hair distribution.   Musculoskeletal:  Negative for falls, joint pain, joint swelling, muscle cramps, muscle weakness and stiffness.   Gastrointestinal:  Negative for constipation, diarrhea, nausea and vomiting.   Neurological:  Positive for sensory change. Negative for brief paralysis, disturbances in coordination, focal weakness, numbness, paresthesias and tremors.           Objective:      Physical Exam  Constitutional:       General: She is not in acute distress.     Appearance: She is well-developed. She is not diaphoretic.   Cardiovascular:      Pulses:           Dorsalis pedis pulses are 1+ on the right side and 1+ on the left side.        Posterior tibial pulses are 1+ on the right side and 1+ on the left side.      Comments: Capillary refill 3 seconds all toes/distal feet, all toes/both feet warm to touch.      Negative lymphadenopathy bilateral popliteal fossa and tarsal tunnel.      <2+ pitting lower extremity edema bilateral.    Musculoskeletal:      Right ankle: No swelling, deformity, ecchymosis or lacerations. Normal range of motion. Normal pulse.      Right Achilles Tendon: Normal. No defects. Manning's test negative.      Comments: Relates left leg/thigh  weakness - not tested in office today.   Lymphadenopathy:      Lower Body: No right inguinal adenopathy. No left inguinal adenopathy.      Comments: Negative lymphadenopathy bilateral popliteal fossa and tarsal tunnel.    Negative lymphangitic streaking bilateral feet/ankles/legs.   Skin:     General: Skin is warm and dry.      Capillary Refill: Capillary refill takes 2 to 3 seconds.      Coloration: Skin is not pale.      Findings: No abrasion, bruising, burn, ecchymosis, erythema, laceration, lesion or rash.      Nails: There is no clubbing.      Comments:   Focal area dorsal medial left 3rd  toe dipj dry indurated gangrene demarcating  without open skin ulceration, drainage, pus, tracking, fluctuance, malodor, or cardinal signs infection.     Otherwise, Skin thin, shiny, atrophic, with decreased density and distribution of pedal hair bilateral, but without hyperpigmentation, daniele discoloration,  ulcers, masses, nodules or cords palpated bilateral feet and legs.    Neurological:      Mental Status: She is alert and oriented to person, place, and time.      Sensory: No sensory deficit.      Motor: No tremor, atrophy or abnormal muscle tone.      Gait: Gait normal.      Comments: Negative tinel sign to percussion sural, superficial peroneal, deep peroneal, saphenous, and posterior tibial nerves right and left ankles and feet.     Psychiatric:         Behavior: Behavior is cooperative.             Assessment:       Encounter Diagnosis   Name Primary?    Ischemic toe ulcer, left, with unspecified severity Yes         Plan:       Eva was seen today for wound check.    Diagnoses and all orders for this visit:    Ischemic toe ulcer, left, with unspecified severity      I counseled the patient on her conditions, their implications and medical management.    Continue betadine swabs daily left 3rd toe.  Cover with band aid if desired.  Continue open toe shoe (surgical shoes bilateral today)    Heel offload boots all times  stationary.    Keep all vascualr follow up.    Continue HH.    2 week, assessment/evaluation, sooner prn.          No follow-ups on file.

## 2023-12-03 DIAGNOSIS — Z71.89 COMPLEX CARE COORDINATION: ICD-10-CM

## 2023-12-04 ENCOUNTER — OFFICE VISIT (OUTPATIENT)
Dept: PODIATRY | Facility: CLINIC | Age: 58
End: 2023-12-04
Payer: COMMERCIAL

## 2023-12-04 VITALS
BODY MASS INDEX: 17.75 KG/M2 | HEIGHT: 64 IN | DIASTOLIC BLOOD PRESSURE: 90 MMHG | WEIGHT: 104 LBS | HEART RATE: 95 BPM | SYSTOLIC BLOOD PRESSURE: 137 MMHG

## 2023-12-04 DIAGNOSIS — I73.9 PAD (PERIPHERAL ARTERY DISEASE): ICD-10-CM

## 2023-12-04 DIAGNOSIS — L97.429 HEEL ULCER, LEFT, WITH UNSPECIFIED SEVERITY: ICD-10-CM

## 2023-12-04 DIAGNOSIS — L97.529 ISCHEMIC TOE ULCER, LEFT, WITH UNSPECIFIED SEVERITY: Primary | ICD-10-CM

## 2023-12-04 PROCEDURE — 99213 OFFICE O/P EST LOW 20 MIN: CPT | Mod: GW,S$GLB,, | Performed by: PODIATRIST

## 2023-12-04 PROCEDURE — 99215 OFFICE O/P EST HI 40 MIN: CPT | Mod: PBBFAC,GW,PO | Performed by: PODIATRIST

## 2023-12-04 PROCEDURE — 99999 PR PBB SHADOW E&M-EST. PATIENT-LVL V: CPT | Mod: PBBFAC,,, | Performed by: PODIATRIST

## 2023-12-04 PROCEDURE — 99213 PR OFFICE/OUTPT VISIT, EST, LEVL III, 20-29 MIN: ICD-10-PCS | Mod: GW,S$GLB,, | Performed by: PODIATRIST

## 2023-12-04 PROCEDURE — 99999 PR PBB SHADOW E&M-EST. PATIENT-LVL V: ICD-10-PCS | Mod: PBBFAC,,, | Performed by: PODIATRIST

## 2023-12-04 NOTE — PROGRESS NOTES
Subjective:      Patient ID: Eva Bloom is a 58 y.o. female.    Chief Complaint: Wound Check    Ulcer/gangrene left 3rd toe (1st toe left healed, pip area 3rd toe healed).  Gradual onset, improving slowly over past 8 weeks, aggravated by increased weight bearing, shoe gear, pressure.  Betadine swabs, open toe shoes, angioplasty all helping.. Denies trauma, surgery.    Increased pain back of left heel.  Gradual onset, worsening over past several dayss, aggravated by increased weight bearing, shoe gear, pressure.  No previous medical treatment.  No self treatment.    7/27/2023 angioplasty successfuly        Review of Systems   Constitutional: Negative for chills, diaphoresis, fever, malaise/fatigue and night sweats.   Cardiovascular:  Positive for leg swelling. Negative for claudication, cyanosis and syncope.   Skin:  Positive for poor wound healing. Negative for color change, dry skin, nail changes, rash, suspicious lesions and unusual hair distribution.   Musculoskeletal:  Negative for falls, joint pain, joint swelling, muscle cramps, muscle weakness and stiffness.   Gastrointestinal:  Negative for constipation, diarrhea, nausea and vomiting.   Neurological:  Positive for sensory change. Negative for brief paralysis, disturbances in coordination, focal weakness, numbness, paresthesias and tremors.           Objective:      Physical Exam  Constitutional:       General: She is not in acute distress.     Appearance: She is well-developed. She is not diaphoretic.   Cardiovascular:      Pulses:           Dorsalis pedis pulses are 1+ on the right side and 1+ on the left side.        Posterior tibial pulses are 1+ on the right side and 1+ on the left side.      Comments: Capillary refill 3 seconds all toes/distal feet, all toes/both feet warm to touch.      Negative lymphadenopathy bilateral popliteal fossa and tarsal tunnel.      <2+ pitting lower extremity edema bilateral.    Musculoskeletal:      Right ankle: No  swelling, deformity, ecchymosis or lacerations. Normal range of motion. Normal pulse.      Right Achilles Tendon: Normal. No defects. Manning's test negative.      Comments: Relates left leg/thigh weakness - not tested in office today.   Lymphadenopathy:      Lower Body: No right inguinal adenopathy. No left inguinal adenopathy.      Comments: Negative lymphadenopathy bilateral popliteal fossa and tarsal tunnel.    Negative lymphangitic streaking bilateral feet/ankles/legs.   Skin:     General: Skin is warm and dry.      Capillary Refill: Capillary refill takes 2 to 3 seconds.      Coloration: Skin is not pale.      Findings: No abrasion, bruising, burn, ecchymosis, erythema, laceration, lesion or rash.      Nails: There is no clubbing.      Comments:   Focal area dorsal medial left 3rd  toe dipj dry indurated gangrene demarcating  without open skin ulceration, drainage, pus, tracking, fluctuance, malodor, or cardinal signs infection.     Otherwise, Skin thin, shiny, atrophic, with decreased density and distribution of pedal hair bilateral, but without hyperpigmentation, daniele discoloration,  ulcers, masses, nodules or cords palpated bilateral feet and legs.    Neurological:      Mental Status: She is alert and oriented to person, place, and time.      Sensory: No sensory deficit.      Motor: No tremor, atrophy or abnormal muscle tone.      Gait: Gait normal.      Comments: Negative tinel sign to percussion sural, superficial peroneal, deep peroneal, saphenous, and posterior tibial nerves right and left ankles and feet.     Psychiatric:         Behavior: Behavior is cooperative.             Assessment:       Encounter Diagnoses   Name Primary?    Ischemic toe ulcer, left, with unspecified severity Yes    PAD (peripheral artery disease)     Heel ulcer, left, with unspecified severity            Plan:       Eva was seen today for wound check.    Diagnoses and all orders for this visit:    Ischemic toe ulcer, left,  with unspecified severity  -     ORTHOTIC DEVICE (DME)  -     Ambulatory referral/consult to Vascular Surgery; Future    PAD (peripheral artery disease)  -     ORTHOTIC DEVICE (DME)  -     Ambulatory referral/consult to Vascular Surgery; Future    Heel ulcer, left, with unspecified severity  -     ORTHOTIC DEVICE (DME)  -     Ambulatory referral/consult to Vascular Surgery; Future        I counseled the patient on her conditions, their implications and medical management.    Continue betadine swabs daily left 3rd toe and heel.  Cover with band aid if desired.  Continue open toe shoe (surgical shoes bilateral today)    Heel offload boots all times stationary.  New Rx multi podis boot - may do better than current heel cushion boots.    Re consult vascualr surgery - any possible improvement in circulation?  Heel ulcer is new.    Continue HH.    2 week, assessment/evaluation, sooner prn.          Follow up in about 2 weeks (around 12/18/2023).

## 2023-12-08 ENCOUNTER — HOSPITAL ENCOUNTER (OUTPATIENT)
Dept: RADIOLOGY | Facility: HOSPITAL | Age: 58
Discharge: HOME | End: 2023-12-08
Attending: INTERNAL MEDICINE
Payer: MEDICARE

## 2023-12-08 DIAGNOSIS — Z12.31 VISIT FOR SCREENING MAMMOGRAM: ICD-10-CM

## 2023-12-08 PROCEDURE — 77067 SCR MAMMO BI INCL CAD: CPT | Mod: 26,GW,, | Performed by: RADIOLOGY

## 2023-12-08 PROCEDURE — 77063 BREAST TOMOSYNTHESIS BI: CPT | Mod: 26,GW,, | Performed by: RADIOLOGY

## 2023-12-08 PROCEDURE — 77063 MAMMO DIGITAL SCREENING BILAT WITH TOMO: ICD-10-PCS | Mod: 26,GW,, | Performed by: RADIOLOGY

## 2023-12-08 PROCEDURE — 77067 SCR MAMMO BI INCL CAD: CPT | Mod: TC,PO

## 2023-12-08 PROCEDURE — 77067 MAMMO DIGITAL SCREENING BILAT WITH TOMO: ICD-10-PCS | Mod: 26,GW,, | Performed by: RADIOLOGY

## 2023-12-15 ENCOUNTER — TELEPHONE (OUTPATIENT)
Dept: PODIATRY | Facility: CLINIC | Age: 58
End: 2023-12-15

## 2023-12-15 ENCOUNTER — OFFICE VISIT (OUTPATIENT)
Dept: PODIATRY | Facility: CLINIC | Age: 58
End: 2023-12-15
Payer: MEDICARE

## 2023-12-15 ENCOUNTER — HOSPITAL ENCOUNTER (OUTPATIENT)
Dept: RADIOLOGY | Facility: HOSPITAL | Age: 58
Discharge: HOME | End: 2023-12-15
Attending: PODIATRIST
Payer: MEDICARE

## 2023-12-15 VITALS
WEIGHT: 98 LBS | HEIGHT: 64 IN | BODY MASS INDEX: 16.73 KG/M2 | HEART RATE: 95 BPM | SYSTOLIC BLOOD PRESSURE: 111 MMHG | DIASTOLIC BLOOD PRESSURE: 87 MMHG

## 2023-12-15 DIAGNOSIS — L97.529 ISCHEMIC TOE ULCER, LEFT, WITH UNSPECIFIED SEVERITY: Primary | ICD-10-CM

## 2023-12-15 DIAGNOSIS — L97.429 HEEL ULCER, LEFT, WITH UNSPECIFIED SEVERITY: ICD-10-CM

## 2023-12-15 DIAGNOSIS — L97.529 ISCHEMIC TOE ULCER, LEFT, WITH UNSPECIFIED SEVERITY: ICD-10-CM

## 2023-12-15 DIAGNOSIS — I73.9 PAD (PERIPHERAL ARTERY DISEASE): ICD-10-CM

## 2023-12-15 PROCEDURE — 99213 PR OFFICE/OUTPT VISIT, EST, LEVL III, 20-29 MIN: ICD-10-PCS | Mod: GW,S$PBB,, | Performed by: PODIATRIST

## 2023-12-15 PROCEDURE — 73630 X-RAY EXAM OF FOOT: CPT | Mod: 26,LT,, | Performed by: RADIOLOGY

## 2023-12-15 PROCEDURE — 73630 XR FOOT COMPLETE 3 VIEW LEFT: ICD-10-PCS | Mod: 26,LT,, | Performed by: RADIOLOGY

## 2023-12-15 PROCEDURE — 73630 X-RAY EXAM OF FOOT: CPT | Mod: TC,FY,PO,LT

## 2023-12-15 PROCEDURE — 99213 OFFICE O/P EST LOW 20 MIN: CPT | Mod: GW,S$PBB,, | Performed by: PODIATRIST

## 2023-12-15 PROCEDURE — 99999 PR PBB SHADOW E&M-EST. PATIENT-LVL IV: CPT | Mod: PBBFAC,,, | Performed by: PODIATRIST

## 2023-12-15 PROCEDURE — 99214 OFFICE O/P EST MOD 30 MIN: CPT | Mod: PBBFAC,PO | Performed by: PODIATRIST

## 2023-12-15 PROCEDURE — 99999 PR PBB SHADOW E&M-EST. PATIENT-LVL IV: ICD-10-PCS | Mod: PBBFAC,,, | Performed by: PODIATRIST

## 2023-12-15 NOTE — TELEPHONE ENCOUNTER
Reached out to [facility] to get patient a sooner appointment per Dr. Farooq. Staff stated the sooner appointment is 02/05.    Staff verbalized understanding.

## 2023-12-15 NOTE — PROGRESS NOTES
Subjective:      Patient ID: Eva Bloom is a 58 y.o. female.    Chief Complaint: Wound Care    Ulcer/gangrene left 3rd toe (1st toe left healed, pip area 3rd toe healed).  Gradual onset, improving slowly over past 8 weeks, aggravated by increased weight bearing, shoe gear, pressure.  Betadine swabs, open toe shoes, angioplasty all helping.. Denies trauma, surgery.    Increased pain back of left heel.  Gradual onset, worsening over past several dayss, aggravated by increased weight bearing, shoe gear, pressure.  No previous medical treatment.  No self treatment.    7/27/2023 angioplasty successfuly        Review of Systems   Constitutional: Negative for chills, diaphoresis, fever, malaise/fatigue and night sweats.   Cardiovascular:  Positive for leg swelling. Negative for claudication, cyanosis and syncope.   Skin:  Positive for poor wound healing. Negative for color change, dry skin, nail changes, rash, suspicious lesions and unusual hair distribution.   Musculoskeletal:  Negative for falls, joint pain, joint swelling, muscle cramps, muscle weakness and stiffness.   Gastrointestinal:  Negative for constipation, diarrhea, nausea and vomiting.   Neurological:  Positive for sensory change. Negative for brief paralysis, disturbances in coordination, focal weakness, numbness, paresthesias and tremors.           Objective:      Physical Exam  Constitutional:       General: She is not in acute distress.     Appearance: She is well-developed. She is not diaphoretic.   Cardiovascular:      Pulses:           Dorsalis pedis pulses are 1+ on the right side and 1+ on the left side.        Posterior tibial pulses are 1+ on the right side and 1+ on the left side.      Comments: Capillary refill 3 seconds all toes/distal feet, all toes/both feet warm to touch.      Negative lymphadenopathy bilateral popliteal fossa and tarsal tunnel.      <2+ pitting lower extremity edema bilateral.    Musculoskeletal:      Right ankle: No  swelling, deformity, ecchymosis or lacerations. Normal range of motion. Normal pulse.      Right Achilles Tendon: Normal. No defects. Manning's test negative.      Comments: Relates left leg/thigh weakness - not tested in office today.   Lymphadenopathy:      Lower Body: No right inguinal adenopathy. No left inguinal adenopathy.      Comments: Negative lymphadenopathy bilateral popliteal fossa and tarsal tunnel.    Negative lymphangitic streaking bilateral feet/ankles/legs.   Skin:     General: Skin is warm and dry.      Capillary Refill: Capillary refill takes 2 to 3 seconds.      Coloration: Skin is not pale.      Findings: No abrasion, bruising, burn, ecchymosis, erythema, laceration, lesion or rash.      Nails: There is no clubbing.      Comments:   Focal area dorsal medial left 3rd  toe dipj dry indurated gangrene demarcating  without open skin ulceration, drainage, pus, tracking, fluctuance, malodor, or cardinal signs infection.    New ulcer posterior inferior left heel black eschar  without ulceration, drainage, pus, tracking, fluctuance, malodor, or cardinal signs infection.        Otherwise, Skin thin, shiny, atrophic, with decreased density and distribution of pedal hair bilateral, but without hyperpigmentation, daniele discoloration,  ulcers, masses, nodules or cords palpated bilateral feet and legs.    Neurological:      Mental Status: She is alert and oriented to person, place, and time.      Sensory: No sensory deficit.      Motor: No tremor, atrophy or abnormal muscle tone.      Gait: Gait normal.      Comments: Negative tinel sign to percussion sural, superficial peroneal, deep peroneal, saphenous, and posterior tibial nerves right and left ankles and feet.     Psychiatric:         Behavior: Behavior is cooperative.           Assessment:       Encounter Diagnoses   Name Primary?    Ischemic toe ulcer, left, with unspecified severity Yes    PAD (peripheral artery disease)     Heel ulcer, left, with  unspecified severity            Plan:       Eva was seen today for wound care.    Diagnoses and all orders for this visit:    Ischemic toe ulcer, left, with unspecified severity    PAD (peripheral artery disease)    Heel ulcer, left, with unspecified severity        I counseled the patient on her conditions, their implications and medical management.    Continue betadine swabs daily left 3rd toe and heel.  Cover with band aid if desired.  Continue open toe shoe (surgical shoes bilateral today)    Heel offload boots all times stationary.  New Rx multi podis boot - may do better than current heel cushion boots.    Re consult vascualr surgery - any possible improvement in circulation?  Heel ulcer is new.    Continue HH.    2 week, assessment/evaluation, sooner prn.          No follow-ups on file.

## 2024-01-01 NOTE — TELEPHONE ENCOUNTER
Phone call returned. Informed them we are waiting on the appeal to be approved.    ----- Message from Christiane Brown sent at 12/14/2017  4:16 PM CST -----  Contact: Joselyn with [facility]  Joselyn calling regarding an appeal for cabozantinib.        Joselyn call back number [phone]      no

## 2024-01-02 ENCOUNTER — HOSPITAL ENCOUNTER (INPATIENT)
Facility: HOSPITAL | Age: 59
LOS: 3 days | Discharge: HOME HEALTH | DRG: 064 | End: 2024-01-05
Attending: EMERGENCY MEDICINE | Admitting: HOSPITALIST
Payer: MEDICARE

## 2024-01-02 DIAGNOSIS — N18.6 ESRD (END STAGE RENAL DISEASE) ON DIALYSIS: Chronic | ICD-10-CM

## 2024-01-02 DIAGNOSIS — L25.8 INCONTINENCE ASSOCIATED DERMATITIS: ICD-10-CM

## 2024-01-02 DIAGNOSIS — R32 INCONTINENCE ASSOCIATED DERMATITIS: ICD-10-CM

## 2024-01-02 DIAGNOSIS — R41.82 ALTERED MENTAL STATUS, UNSPECIFIED ALTERED MENTAL STATUS TYPE: ICD-10-CM

## 2024-01-02 DIAGNOSIS — Z99.2 ESRD (END STAGE RENAL DISEASE) ON DIALYSIS: Chronic | ICD-10-CM

## 2024-01-02 DIAGNOSIS — R93.0 ABNORMAL HEAD CT: ICD-10-CM

## 2024-01-02 DIAGNOSIS — R00.0 TACHYCARDIA: ICD-10-CM

## 2024-01-02 DIAGNOSIS — C78.00 METASTATIC RENAL CELL CARCINOMA TO LUNG, UNSPECIFIED LATERALITY: ICD-10-CM

## 2024-01-02 DIAGNOSIS — I63.9 CEREBROVASCULAR ACCIDENT (CVA), UNSPECIFIED MECHANISM: Primary | ICD-10-CM

## 2024-01-02 DIAGNOSIS — C64.9 METASTATIC RENAL CELL CARCINOMA TO LUNG, UNSPECIFIED LATERALITY: ICD-10-CM

## 2024-01-02 PROBLEM — G93.41 ACUTE METABOLIC ENCEPHALOPATHY: Status: ACTIVE | Noted: 2024-01-02

## 2024-01-02 LAB
ALBUMIN SERPL BCP-MCNC: 3.1 G/DL (ref 3.5–5.2)
ALP SERPL-CCNC: 606 U/L (ref 55–135)
ALT SERPL W/O P-5'-P-CCNC: 28 U/L (ref 10–44)
AMMONIA PLAS-SCNC: 38 UMOL/L (ref 10–50)
ANION GAP SERPL CALC-SCNC: 17 MMOL/L (ref 8–16)
APAP SERPL-MCNC: <3 UG/ML (ref 10–20)
APTT PPP: 28.3 SEC (ref 21–32)
AST SERPL-CCNC: 63 U/L (ref 10–40)
BASOPHILS # BLD AUTO: 0.07 K/UL (ref 0–0.2)
BASOPHILS NFR BLD: 1.1 % (ref 0–1.9)
BILIRUB SERPL-MCNC: 1.9 MG/DL (ref 0.1–1)
BUN SERPL-MCNC: 28 MG/DL (ref 6–20)
CALCIUM SERPL-MCNC: 9.8 MG/DL (ref 8.7–10.5)
CHLORIDE SERPL-SCNC: 98 MMOL/L (ref 95–110)
CO2 SERPL-SCNC: 22 MMOL/L (ref 23–29)
CREAT SERPL-MCNC: 3.9 MG/DL (ref 0.5–1.4)
CTP QC/QA: YES
CTP QC/QA: YES
DIFFERENTIAL METHOD BLD: ABNORMAL
EOSINOPHIL # BLD AUTO: 0.2 K/UL (ref 0–0.5)
EOSINOPHIL NFR BLD: 2.9 % (ref 0–8)
ERYTHROCYTE [DISTWIDTH] IN BLOOD BY AUTOMATED COUNT: 19.8 % (ref 11.5–14.5)
EST. GFR  (NO RACE VARIABLE): 13 ML/MIN/1.73 M^2
ETHANOL SERPL-MCNC: <10 MG/DL
GLUCOSE SERPL-MCNC: 75 MG/DL (ref 70–110)
HCT VFR BLD AUTO: 49.8 % (ref 37–48.5)
HGB BLD-MCNC: 14 G/DL (ref 12–16)
IMM GRANULOCYTES # BLD AUTO: 0.05 K/UL (ref 0–0.04)
IMM GRANULOCYTES NFR BLD AUTO: 0.8 % (ref 0–0.5)
INR PPP: 1.1 (ref 0.8–1.2)
LACTATE SERPL-SCNC: 2.9 MMOL/L (ref 0.5–2.2)
LYMPHOCYTES # BLD AUTO: 0.8 K/UL (ref 1–4.8)
LYMPHOCYTES NFR BLD: 12.7 % (ref 18–48)
MCH RBC QN AUTO: 24.8 PG (ref 27–31)
MCHC RBC AUTO-ENTMCNC: 28.1 G/DL (ref 32–36)
MCV RBC AUTO: 88 FL (ref 82–98)
MONOCYTES # BLD AUTO: 0.6 K/UL (ref 0.3–1)
MONOCYTES NFR BLD: 9.8 % (ref 4–15)
NEUTROPHILS # BLD AUTO: 4.5 K/UL (ref 1.8–7.7)
NEUTROPHILS NFR BLD: 72.7 % (ref 38–73)
NRBC BLD-RTO: 0 /100 WBC
PLATELET # BLD AUTO: 255 K/UL (ref 150–450)
PMV BLD AUTO: 9.1 FL (ref 9.2–12.9)
POC MOLECULAR INFLUENZA A AGN: NEGATIVE
POC MOLECULAR INFLUENZA B AGN: NEGATIVE
POCT GLUCOSE: 86 MG/DL (ref 70–110)
POTASSIUM SERPL-SCNC: 5 MMOL/L (ref 3.5–5.1)
PROCALCITONIN SERPL IA-MCNC: 1.38 NG/ML
PROT SERPL-MCNC: 9.5 G/DL (ref 6–8.4)
PROTHROMBIN TIME: 11.6 SEC (ref 9–12.5)
RBC # BLD AUTO: 5.64 M/UL (ref 4–5.4)
SALICYLATES SERPL-MCNC: <5 MG/DL (ref 15–30)
SARS-COV-2 RDRP RESP QL NAA+PROBE: NEGATIVE
SODIUM SERPL-SCNC: 137 MMOL/L (ref 136–145)
TSH SERPL DL<=0.005 MIU/L-ACNC: 1.08 UIU/ML (ref 0.4–4)
WBC # BLD AUTO: 6.14 K/UL (ref 3.9–12.7)

## 2024-01-02 PROCEDURE — 82140 ASSAY OF AMMONIA: CPT | Performed by: EMERGENCY MEDICINE

## 2024-01-02 PROCEDURE — 83605 ASSAY OF LACTIC ACID: CPT | Performed by: EMERGENCY MEDICINE

## 2024-01-02 PROCEDURE — 11000001 HC ACUTE MED/SURG PRIVATE ROOM

## 2024-01-02 PROCEDURE — 99223 1ST HOSP IP/OBS HIGH 75: CPT | Mod: GW,HPC,, | Performed by: INTERNAL MEDICINE

## 2024-01-02 PROCEDURE — 96367 TX/PROPH/DG ADDL SEQ IV INF: CPT

## 2024-01-02 PROCEDURE — 96365 THER/PROPH/DIAG IV INF INIT: CPT

## 2024-01-02 PROCEDURE — 80179 DRUG ASSAY SALICYLATE: CPT | Performed by: EMERGENCY MEDICINE

## 2024-01-02 PROCEDURE — 95816 EEG AWAKE AND DROWSY: CPT

## 2024-01-02 PROCEDURE — 25000003 PHARM REV CODE 250: Performed by: HOSPITALIST

## 2024-01-02 PROCEDURE — 93010 ELECTROCARDIOGRAM REPORT: CPT | Mod: GW,,, | Performed by: INTERNAL MEDICINE

## 2024-01-02 PROCEDURE — 25500020 PHARM REV CODE 255: Performed by: HOSPITALIST

## 2024-01-02 PROCEDURE — 25000003 PHARM REV CODE 250: Performed by: INTERNAL MEDICINE

## 2024-01-02 PROCEDURE — 84443 ASSAY THYROID STIM HORMONE: CPT | Performed by: EMERGENCY MEDICINE

## 2024-01-02 PROCEDURE — 85730 THROMBOPLASTIN TIME PARTIAL: CPT | Performed by: EMERGENCY MEDICINE

## 2024-01-02 PROCEDURE — 80053 COMPREHEN METABOLIC PANEL: CPT | Performed by: EMERGENCY MEDICINE

## 2024-01-02 PROCEDURE — 25000003 PHARM REV CODE 250: Performed by: EMERGENCY MEDICINE

## 2024-01-02 PROCEDURE — 87040 BLOOD CULTURE FOR BACTERIA: CPT | Performed by: EMERGENCY MEDICINE

## 2024-01-02 PROCEDURE — 63600175 PHARM REV CODE 636 W HCPCS: Performed by: EMERGENCY MEDICINE

## 2024-01-02 PROCEDURE — 84145 PROCALCITONIN (PCT): CPT | Performed by: EMERGENCY MEDICINE

## 2024-01-02 PROCEDURE — 85610 PROTHROMBIN TIME: CPT | Performed by: EMERGENCY MEDICINE

## 2024-01-02 PROCEDURE — 82077 ASSAY SPEC XCP UR&BREATH IA: CPT | Performed by: EMERGENCY MEDICINE

## 2024-01-02 PROCEDURE — 80143 DRUG ASSAY ACETAMINOPHEN: CPT | Performed by: EMERGENCY MEDICINE

## 2024-01-02 PROCEDURE — 85025 COMPLETE CBC W/AUTO DIFF WBC: CPT | Performed by: EMERGENCY MEDICINE

## 2024-01-02 PROCEDURE — 87635 SARS-COV-2 COVID-19 AMP PRB: CPT | Performed by: EMERGENCY MEDICINE

## 2024-01-02 PROCEDURE — 93005 ELECTROCARDIOGRAM TRACING: CPT

## 2024-01-02 PROCEDURE — 99285 EMERGENCY DEPT VISIT HI MDM: CPT | Mod: 25

## 2024-01-02 PROCEDURE — 96366 THER/PROPH/DIAG IV INF ADDON: CPT

## 2024-01-02 RX ORDER — IPRATROPIUM BROMIDE AND ALBUTEROL SULFATE 2.5; .5 MG/3ML; MG/3ML
3 SOLUTION RESPIRATORY (INHALATION) EVERY 6 HOURS PRN
Status: DISCONTINUED | OUTPATIENT
Start: 2024-01-02 | End: 2024-01-05 | Stop reason: HOSPADM

## 2024-01-02 RX ORDER — ACETAMINOPHEN 325 MG/1
650 TABLET ORAL EVERY 6 HOURS PRN
Status: DISCONTINUED | OUTPATIENT
Start: 2024-01-02 | End: 2024-01-05 | Stop reason: HOSPADM

## 2024-01-02 RX ORDER — LEVOTHYROXINE SODIUM 75 UG/1
75 TABLET ORAL
Status: DISCONTINUED | OUTPATIENT
Start: 2024-01-03 | End: 2024-01-05 | Stop reason: HOSPADM

## 2024-01-02 RX ORDER — ACETAMINOPHEN 650 MG/1
650 SUPPOSITORY RECTAL
Status: COMPLETED | OUTPATIENT
Start: 2024-01-02 | End: 2024-01-02

## 2024-01-02 RX ORDER — ASPIRIN 81 MG/1
81 TABLET ORAL DAILY
Status: DISCONTINUED | OUTPATIENT
Start: 2024-01-02 | End: 2024-01-05 | Stop reason: HOSPADM

## 2024-01-02 RX ORDER — MUPIROCIN 20 MG/G
OINTMENT TOPICAL 2 TIMES DAILY
Status: DISCONTINUED | OUTPATIENT
Start: 2024-01-02 | End: 2024-01-05 | Stop reason: HOSPADM

## 2024-01-02 RX ORDER — ATORVASTATIN CALCIUM 40 MG/1
40 TABLET, FILM COATED ORAL DAILY
Status: DISCONTINUED | OUTPATIENT
Start: 2024-01-02 | End: 2024-01-05 | Stop reason: HOSPADM

## 2024-01-02 RX ORDER — FAMOTIDINE 20 MG/1
20 TABLET, FILM COATED ORAL DAILY
Status: DISCONTINUED | OUTPATIENT
Start: 2024-01-02 | End: 2024-01-05 | Stop reason: HOSPADM

## 2024-01-02 RX ADMIN — PIPERACILLIN AND TAZOBACTAM 4.5 G: 4; .5 INJECTION, POWDER, LYOPHILIZED, FOR SOLUTION INTRAVENOUS; PARENTERAL at 11:01

## 2024-01-02 RX ADMIN — FAMOTIDINE 20 MG: 20 TABLET ORAL at 05:01

## 2024-01-02 RX ADMIN — SODIUM CHLORIDE, POTASSIUM CHLORIDE, SODIUM LACTATE AND CALCIUM CHLORIDE 250 ML: 600; 310; 30; 20 INJECTION, SOLUTION INTRAVENOUS at 11:01

## 2024-01-02 RX ADMIN — ACETAMINOPHEN 650 MG: 650 SUPPOSITORY RECTAL at 11:01

## 2024-01-02 RX ADMIN — IOHEXOL 60 ML: 350 INJECTION, SOLUTION INTRAVENOUS at 03:01

## 2024-01-02 RX ADMIN — MUPIROCIN: 20 OINTMENT TOPICAL at 08:01

## 2024-01-02 RX ADMIN — ASPIRIN 81 MG: 81 TABLET, COATED ORAL at 05:01

## 2024-01-02 RX ADMIN — APIXABAN 2.5 MG: 2.5 TABLET, FILM COATED ORAL at 08:01

## 2024-01-02 RX ADMIN — PIPERACILLIN AND TAZOBACTAM 4.5 G: 4; .5 INJECTION, POWDER, LYOPHILIZED, FOR SOLUTION INTRAVENOUS; PARENTERAL at 08:01

## 2024-01-02 RX ADMIN — VANCOMYCIN HYDROCHLORIDE 500 MG: 500 INJECTION, POWDER, LYOPHILIZED, FOR SOLUTION INTRAVENOUS at 12:01

## 2024-01-02 RX ADMIN — ATORVASTATIN CALCIUM 40 MG: 40 TABLET, FILM COATED ORAL at 05:01

## 2024-01-02 NOTE — ASSESSMENT & PLAN NOTE
Patient's anemia is currently controlled. Has not received any PRBCs to date. Etiology likely d/t Iron deficiency  Current CBC reviewed-   Lab Results   Component Value Date    HGB 14.0 01/02/2024    HCT 49.8 (H) 01/02/2024     Monitor serial CBC and transfuse if patient becomes hemodynamically unstable, symptomatic or H/H drops below 7/21.

## 2024-01-02 NOTE — H&P
[facility]  [facility] Medicine  History & Physical    Patient Name: Eva Bloom  MRN: [id number]  Patient Class: IP- Inpatient  Admission Date: 1/2/2024  Attending Physician: José Miguel Cristina, *   Primary Care Provider: Sowmya Mccain MD         Patient information was obtained from patient, relative(s), past medical records, and ER records.     Subjective:     Principal Problem:Acute metabolic encephalopathy    Chief Complaint:   Chief Complaint   Patient presents with    Altered Mental Status     Pt presents to the ED with her  who reports pt has been confused, talking to herself, and sleeping more x a few days. Pt's  reports pt goes to dialysis T/Th/Sat, finished dialysis tx just PTA. Pt not answering questions, falling asleep in triage.         HPI: 59 yo F with a PMHx of CHF with EF of 23%, ESRD on dialysis (TTS), Afib on OAC, AICD placement (currently disconnected per ), RCC with metastatic disease to lung and pancreas, presenting to the ED BIB  for altered mental status. History is provided by independent historian, pt's , reports for the last few days, she has been confused, intermittently falling asleep, experiencing auditory hallucinations, and talking to herself, and experiencing dark stools.  took her to dialysis this morning, was able to receive full session but brought her straight here afterwards for further evaluation. She currently has a port a cath to her chest wall for dialysis, and is anuric, no recent missed sessions. Her nephrologist is Dr Bender.  reports she ambulates w/ a walker, is able to hold a full conversation and answer questions at baseline. She has been able to recognize him the last few days.  also reports she received a dose of the COVID vaccine around 4 days ago.   In ER patient is febrile 100.8,chest X ray with no sign of pneumonia,Covid and flu are negative,patient received broad spectrum IV  Abx,not able get urine,not urinating,blood culture is done,her HH is stable,head CT suspect for CVA,neurology is consulted and patient will have MRI brain and EEG.    Past Medical History:   Diagnosis Date    Anemia     Anticoagulant long-term use     Atrial fibrillation     Bronchitis 03/01/2017    Cancer 2016    kidney cancer    CHF (congestive heart failure), NYHA class II, chronic, systolic     CMV (cytomegalovirus) antibody positive     Encounter for blood transfusion     ESRD (end stage renal disease)     Essential hypertension 09/23/2015    H/O herpes simplex type 2 infection     Herpes simplex type 1 antibody positive     History of kidney cancer     s/p left nephrectomy 1/2016    Hyperparathyroidism, unspecified     Hyperuricemia without signs of inflammatory arthritis and tophaceous disease     Kidney stones     LGSIL (low grade squamous intraepithelial dysplasia)     Myocardiopathy 07/21/2017    Prediabetes     Proteinuria     Renal disorder     Thyroid nodule     Urate nephropathy        Past Surgical History:   Procedure Laterality Date    BREAST CYST EXCISION      COLONOSCOPY N/A 11/12/2015    COLONOSCOPY N/A 3/12/2021    Procedure: COLONOSCOPY;  Surgeon: Brendon Lanier MD;  Location: [facility];  Service: Endoscopy;  Laterality: N/A;  covid test 3/9, labs prior, prep instr mailed -ml    EXCISION OF ARTERIOVENOUS FISTULA Left 9/27/2023    Procedure: EXCISION, AV FISTULA;  Surgeon: FARIDEH Muñoz III, MD;  Location: [facility] OR [address] [place] [address];  Service: Vascular;  Laterality: Left;  LUE AV graft excision    INSERTION OF BIVENTRICULAR IMPLANTABLE CARDIOVERTER-DEFIBRILLATOR (ICD)  04/2021    INSERTION OF TUNNELED CENTRAL VENOUS CATHETER (CVC) WITH SUBCUTANEOUS PORT N/A 1/25/2023    Procedure: INSERTION, DUAL LUMEN CATHETER WITH PORT, WITH IMAGING GUIDANCE;  Surgeon: FARIDEH Muñoz III, MD;  Location: [facility] OR [address] [place] [address];  Service: Peripheral Vascular;  Laterality: N/A;  possinble permcath placment     NEPHRECTOMY-LAPAROSCOPIC Left 01/12/2016    PERCUTANEOUS TRANSLUMINAL ANGIOPLASTY OF ARTERIOVENOUS FISTULA Left 4/19/2023    Procedure: PTA, AV FISTULA;  Surgeon: FARIDEH Muñoz III, MD;  Location: [facility] CATH LAB;  Service: Peripheral Vascular;  Laterality: Left;    PERITONEAL CATHETER INSERTION      Permacath insertion  01/12/2017    PLACEMENT OF ARTERIOVENOUS GRAFT  12/28/2022    Procedure: INSERTION, GRAFT, ARTERIOVENOUS;  Surgeon: FARIDEH Muñoz III, MD;  Location: [facility] OR [facility] FLR;  Service: Peripheral Vascular;;    REMOVAL, GRAFT, ARTERIOVENOUS, UPPER EXTREMITY Left 1/25/2023    Procedure: REMOVAL, GRAFT, ARTERIOVENOUS, UPPER EXTREMITY;  Surgeon: FARIDEH Muñoz III, MD;  [facility];  Service: Peripheral Vascular;  Laterality: Left;    REVISION OF ARTERIOVENOUS FISTULA Left 5/1/2023    Procedure: REVISION, AV FISTULA;  Surgeon: FARIDEH Muñoz III, MD;  [facility];  Service: Peripheral Vascular;  Laterality: Left;  LUE AVG revision    REVISION OF PROCEDURE INVOLVING ARTERIOVENOUS GRAFT Left 12/28/2022    Procedure: REVISION, PROCEDURE INVOLVING ARTERIOVENOUS GRAFT;  Surgeon: FARIDEH Muñoz III, MD;  [facility];  Service: Peripheral Vascular;  Laterality: Left;    REVISION OF PROCEDURE INVOLVING ARTERIOVENOUS GRAFT Left 7/21/2023    Procedure: LEFT ARM ARTERIOVENOUS GRAFT EXCISION  AND LIGATION;  Surgeon: Obi Werner MD;  Location: [facility];  Service: Vascular;  Laterality: Left;  Left AVG excision and revision with interposition    SALPINGOOPHORECTOMY Right 2016    KJB---DAVINCI    TONSILLECTOMY      TUBAL LIGATION         Review of patient's allergies indicates:   Allergen Reactions    Coreg [carvedilol] Other (See Comments)     Nausea/vomiting    Allopurinol      Other reaction(s): abnormal transaminases       Current Facility-Administered Medications on File Prior to Encounter   Medication    0.9%  NaCl infusion     Current Outpatient Medications  on File Prior to Encounter   Medication Sig    acetaminophen (TYLENOL) 500 MG tablet Take 500 mg by mouth every 6 (six) hours as needed for Pain.    albuterol-ipratropium (DUO-NEB) 2.5 mg-0.5 mg/3 mL nebulizer solution Take by nebulization.    apixaban (ELIQUIS) 2.5 mg Tab Take 1 tablet (2.5 mg total) by mouth 2 (two) times daily.    bisacodyL (DULCOLAX) 10 mg Supp Place 10 mg rectally.    cabozantinib (CABOMETYX) 60 mg Tab TAKE ONE TABLET BY MOUTH ONCE DAILY AT THE SAME TIME ON AN EMPTY STOMACH AT LEAST 1 HOUR BEFORE OR 2 HOURS AFTER EATING. AVOID GRAPEFRUIT PRODUCTS    cloNIDine 0.3 mg/24 hr td ptwk (CATAPRES) 0.3 mg/24 hr Place 1 patch onto the skin every 7 days.    cyclobenzaprine (FLEXERIL) 5 MG tablet TAKE 1 TABLET BY MOUTH DAILY AS NEEDED FOR MUSCLE SPASMS.    ENTRESTO 49-51 mg per tablet TAKE 1 TABLET BY MOUTH TWICE A DAY    epoetin david-epbx (RETACRIT INJ) Epoetin david - epbx (Retacrit)    fluticasone propionate (FLONASE) 50 mcg/actuation nasal spray 1 spray (50 mcg total) by Each Nostril route 2 (two) times daily as needed (ear fullness).    hyoscyamine (LEVSIN/SL) 0.125 mg Subl Place under the tongue.    lanthanum (FOSRENOL) 1000 MG chewable tablet Take 1 tablet by mouth.    levothyroxine (SYNTHROID) 75 MCG tablet Take 1 tablet (75 mcg total) by mouth once daily.    lidocaine-prilocaine (EMLA) cream APPLY ATLEAST 30 MINUTES BEFORE TREATMENT 3 TIMES A WEEK    LORAZEPAM INTENSOL 2 mg/mL Conc Take by mouth.    metoprolol succinate (TOPROL-XL) 100 MG 24 hr tablet Take 1/2 tablet (50mg) once daily    mirtazapine (REMERON) 15 MG tablet Take 15 mg by mouth every evening.    morphine 100 mg/5 mL (20 mg/mL) concentrated solution Take by mouth.    mv,Ca,min-folic acid-vit K1 (ONE-A-DAY WOMEN'S 50 PLUS) 400-20 mcg Tab Take 1 tablet by mouth once daily.    naloxone (NARCAN) 1 mg/mL injection 2 mg (1 mg per nostril) by Nasal route as needed for opioid overdose; may repeat in 3 to 5 minutes if not effective. Call 91     nystatin (MYCOSTATIN) cream SMARTSIG:sparingly Topical 2-4 Times Daily PRN    ondansetron (ZOFRAN-ODT) 4 MG TbDL Take by mouth.    pantoprazole (PROTONIX) 40 MG tablet Take 1 tablet (40 mg total) by mouth 2 (two) times daily.    traMADoL (ULTRAM) 50 mg tablet Take 1 tablet (50 mg total) by mouth every 12 (twelve) hours as needed for Pain.    [DISCONTINUED] amLODIPine (NORVASC) 5 MG tablet TAKE 1 TABLET BY MOUTH EVERY DAY     Family History       Problem Relation (Age of Onset)    Cataracts Maternal Aunt    Diabetes Father, Maternal Grandfather    Heart disease Sister    Hypertension Mother    Kidney disease Father    No Known Problems Sister, Sister, Brother, Brother, Maternal Uncle, Paternal Aunt, Paternal Uncle, Maternal Grandmother, Paternal Grandmother, Paternal Grandfather, Son, Son, Other          Tobacco Use    Smoking status: Never     Passive exposure: Never    Smokeless tobacco: Never   Substance and Sexual Activity    Alcohol use: No    Drug use: Yes     Types: Hydrocodone    Sexual activity: Not Currently     Review of Systems   Constitutional:  Positive for activity change and appetite change.   HENT:  Negative for congestion and dental problem.    Eyes:  Negative for discharge and itching.   Respiratory:  Negative for apnea and chest tightness.    Cardiovascular:  Negative for chest pain and leg swelling.   Gastrointestinal:  Negative for abdominal distention.   Endocrine: Negative for cold intolerance and heat intolerance.   Genitourinary:  Negative for difficulty urinating and dyspareunia.   Musculoskeletal:  Negative for arthralgias and back pain.   Skin:  Negative for color change and pallor.   Allergic/Immunologic: Negative for environmental allergies and food allergies.   Neurological:  Positive for weakness. Negative for dizziness and facial asymmetry.   Hematological:  Negative for adenopathy. Does not bruise/bleed easily.   Psychiatric/Behavioral:  Positive for confusion.      Objective:      Vital Signs (Most Recent):  Temp: (!) 100.8 °F (38.2 °C) (01/02/24 1123)  Pulse: (!) 116 (01/02/24 1231)  Resp: (!) 29 (01/02/24 1231)  BP: 103/72 (01/02/24 1231)  SpO2: (!) 93 % (01/02/24 1231) Vital Signs (24h Range):  Temp:  [97.5 °F (36.4 °C)-100.8 °F (38.2 °C)] 100.8 °F (38.2 °C)  Pulse:  [116-128] 116  Resp:  [18-29] 29  SpO2:  [93 %-97 %] 93 %  BP: (103-158)/(72-95) 103/72     Weight: 39.9 kg (88 lb)  Body mass index is 15.11 kg/m².     Physical Exam  HENT:      Head: Normocephalic.      Nose: Nose normal.      Mouth/Throat:      Mouth: Mucous membranes are dry.   Eyes:      Extraocular Movements: Extraocular movements intact.      Pupils: Pupils are equal, round, and reactive to light.   Cardiovascular:      Rate and Rhythm: Tachycardia present. Rhythm irregular.      Heart sounds: No murmur heard.  Pulmonary:      Effort: No respiratory distress.   Abdominal:      General: There is no distension.   Musculoskeletal:         General: No swelling or deformity.   Skin:     Coloration: Skin is not jaundiced.      Findings: No bruising.   Neurological:      Mental Status: She is alert.      Comments: Not able complete duo to confusion.   Psychiatric:      Comments: Confused,              CRANIAL NERVES     CN III, IV, VI   Pupils are equal, round, and reactive to light.       Significant Labs: All pertinent labs within the past 24 hours have been reviewed.  BMP:   Recent Labs   Lab 01/02/24  1023   GLU 75      K 5.0   CL 98   CO2 22*   BUN 28*   CREATININE 3.9*   CALCIUM 9.8     CBC:   Recent Labs   Lab 01/02/24  1023   WBC 6.14   HGB 14.0   HCT 49.8*        CMP:   Recent Labs   Lab 01/02/24  1023      K 5.0   CL 98   CO2 22*   GLU 75   BUN 28*   CREATININE 3.9*   CALCIUM 9.8   PROT 9.5*   ALBUMIN 3.1*   BILITOT 1.9*   ALKPHOS 606*   AST 63*   ALT 28   ANIONGAP 17*       Significant Imaging: I have reviewed all pertinent imaging results/findings within the past 24 hours.  Assessment/Plan:      * Acute metabolic encephalopathy  Not sure etiology at this time.  In ER patient is febrile 100.8,chest X ray with no sign of pneumonia,Covid and flu are negative,patient received broad spectrum IV Abx,not able get urine,not urinating,blood culture is done,her HH is stable,head CT suspect for CVA,neurology is consulted and patient will have MRI brain and EEG.    Severe protein-calorie malnutrition  Nutrition consulted. Most recent weight and BMI monitored-     Measurements:  Wt Readings from Last 1 Encounters:   01/02/24 39.9 kg (88 lb)   Body mass index is 15.11 kg/m².    Patient has been screened and assessed by RD.    Malnutrition Type:  Context:    Level:      Malnutrition Characteristic Summary:       Interventions/Recommendations (treatment strategy):         Body mass index (BMI) less than 19  Supplement when is more stable.      Metastatic renal cell carcinoma to lung, unspecified laterality    Cancer Staging   Renal cell carcinoma of left kidney  Staging form: Kidney, AJCC 8th Edition  - Pathologic stage from 1/12/2016: Stage Unknown (pT1b(2), pNX, cM0) - Unsigned  - Clinical stage from 11/22/2016: Stage IV (rcTX, cN0, pM1) - Signed by Liset Ngo NP on 9/13/2023        Hypothyroidism  TSH WNL,resumed synthroid.      Aortic atherosclerosis  On medical treatments.      PAF (paroxysmal atrial fibrillation)  Patient with Persistent (7 days or more) atrial fibrillation which is controlled currently with  OAC  . Patient is currently in atrial fibrillation.[id number] Score: 2. HASBLED Score: 3 Anticoagulation indicated. Anticoagulation done with eliquis .    Anemia in ESRD (end-stage renal disease)  Patient's anemia is currently controlled. Has not received any PRBCs to date. Etiology likely d/t Iron deficiency  Current CBC reviewed-   Lab Results   Component Value Date    HGB 14.0 01/02/2024    HCT 49.8 (H) 01/02/2024     Monitor serial CBC and transfuse if patient becomes hemodynamically unstable,  "symptomatic or H/H drops below 7/21.    Chronic combined systolic and diastolic congestive heart failure  Patient is identified as having Combined Systolic and Diastolic heart failure that is Chronic. CHF is currently controlled. Latest ECHO performed and demonstrates- Results for orders placed during the hospital encounter of 05/08/23    Echo Saline Bubble? No    Interpretation Summary  · The left ventricle is normal in size with mild eccentric hypertrophy and severely decreased systolic function.  · The estimated ejection fraction is 23%.  · There is severe left ventricular global hypokinesis.  · Grade II left ventricular diastolic dysfunction.  · Moderate left atrial enlargement.  · Normal right ventricular size with normal right ventricular systolic function.  · Moderate right atrial enlargement.  · There is mild aortic valve stenosis.  · Aortic valve area is 1.58 cm2; peak velocity is 2.01 m/s; mean gradient is 8 mmHg.  · Moderate mitral regurgitation.  · Moderate to severe tricuspid regurgitation.  · Mild pulmonic regurgitation.  · Elevated central venous pressure (15 mmHg).  · The estimated PA systolic pressure is 47 mmHg.  · There is mild-moderate pulmonary hypertension.  · Moderate posterolateral pericardial effusion. Trivial under the RA  . Continue Beta Blocker and monitor clinical status closely. Monitor on telemetry. Patient is off CHF pathway.  Monitor strict Is&Os and daily weights.  Place on fluid restriction of 1.5 L. Cardiology has not been consulted. Continue to stress to patient importance of self efficacy and  on diet for CHF. Last BNP reviewed- and noted below No results for input(s): "BNP", "BNPTRIAGEBLO" in the last 168 hours.    ESRD (end stage renal disease) on dialysis  Creatine stable for now. BMP reviewed- noted Estimated Creatinine Clearance: 9.8 mL/min (A) (based on SCr of 3.9 mg/dL (H)). according to latest data. Based on current GFR, CKD stage is end stage.  Monitor UOP and " serial BMP and adjust therapy as needed. Renally dose meds. Avoid nephrotoxic medications and procedures.    Pulmonary hypertension  Fluid removal with HD      Cardiomyopathy, nonischemic  EF of 23%,will monitor.      S/p nephrectomy left  in 2016,will monitor.      History of kidney cancer  S/P left kidney nephrectomy in 2016.      Essential hypertension  Stable,will monitor.            VTE Risk Mitigation (From admission, onward)           Ordered     apixaban tablet 2.5 mg  2 times daily         01/02/24 1430                               AdmissionCare    Guideline: Sepsis (and Other Febrile Illness without Focal Infection) - INPT, Inpatient    Based on the indications selected for the patient, the bed status of Admit to Inpatient was determined to be MET    The following indications were selected as present at the time of evaluation of the patient:      Altered mental status (ie, different from baseline) that is severe or persistent, as indicated by 1 or more of the following:   -     - Confusional state (eg, disorientation, difficulty following commands, deficit in attention) that persists (eg, for more than few hours) despite appropriate treatment (eg, of underlying cause)    AdmissionCare documentation entered by: Mara Fox    [facility] [address], 27th edition, Copyright © 2023 [facility] [address], [facility] All Rights Reserved.  [id number]:10:[id number]:00    José Miguel Cristina MD  Department of Hospital Medicine  [facility] - Emergency Dept

## 2024-01-02 NOTE — ED NOTES
Attempted to straight cath patient. There were a couple of scans that read 15ml and 20 ml's. And then I finally a 60 ml scan. So the cath was performed. But no urine return.

## 2024-01-02 NOTE — ASSESSMENT & PLAN NOTE
Patient with Persistent (7 days or more) atrial fibrillation which is controlled currently with  OAC  . Patient is currently in atrial fibrillation.[id number] Score: 2. HASBLED Score: 3 Anticoagulation indicated. Anticoagulation done with eliquis .

## 2024-01-02 NOTE — ED PROVIDER NOTES
Encounter Date: 1/2/2024    SCRIBE #1 NOTE: I, Leila Dawson, am scribing for, and in the presence of,  Michael Torres MD.       History     Chief Complaint   Patient presents with    Altered Mental Status     Pt presents to the ED with her  who reports pt has been confused, talking to herself, and sleeping more x a few days. Pt's  reports pt goes to dialysis T/Th/Sat, finished dialysis tx just PTA. Pt not answering questions, falling asleep in triage.      57 yo F with a PMHx of CHF, ESRD on dialysis (TTS), Afib, AICD placement (currently disconnected per ), RCC with metastatic disease to lung and pancreas, presenting to the ED BIB  for altered mental status. History is provided by independent historian, pt's , reports for the last few days, she has been confused, intermittently falling asleep, experiencing auditory hallucinations, and talking to herself, and experiencing dark stools.  took her to dialysis this morning, was able to receive full session but brought her straight here afterwards for further evaluation. She currently has a port a cath to her chest wall for dialysis, and is anuric, no recent missed sessions. Her nephrologist is Dr Bender.  reports she ambulates w/ a walker, is able to hold a full conversation and answer questions at baseline. She has been able to recognize him the last few days.  also reports she received a dose of the COVID vaccine around 4 days ago.     History from patient limited d/t altered mental status.     The history is provided by the spouse.     Review of patient's allergies indicates:   Allergen Reactions    Coreg [carvedilol] Other (See Comments)     Nausea/vomiting    Allopurinol      Other reaction(s): abnormal transaminases     Past Medical History:   Diagnosis Date    Anemia     Anticoagulant long-term use     Atrial fibrillation     Bronchitis 03/01/2017    Cancer 2016    kidney cancer    CHF (congestive heart  failure), NYHA class II, chronic, systolic     CMV (cytomegalovirus) antibody positive     Encounter for blood transfusion     ESRD (end stage renal disease)     Essential hypertension 09/23/2015    H/O herpes simplex type 2 infection     Herpes simplex type 1 antibody positive     History of kidney cancer     s/p left nephrectomy 1/2016    Hyperparathyroidism, unspecified     Hyperuricemia without signs of inflammatory arthritis and tophaceous disease     Kidney stones     LGSIL (low grade squamous intraepithelial dysplasia)     Myocardiopathy 07/21/2017    Prediabetes     Proteinuria     Renal disorder     Thyroid nodule     Urate nephropathy      Past Surgical History:   Procedure Laterality Date    BREAST CYST EXCISION      COLONOSCOPY N/A 11/12/2015    COLONOSCOPY N/A 3/12/2021    Procedure: COLONOSCOPY;  Surgeon: Brendon Lanier MD;  Location: [facility] ENDO;  Service: Endoscopy;  Laterality: N/A;  covid test 3/9, labs prior, prep instr mailed -ml    EXCISION OF ARTERIOVENOUS FISTULA Left 9/27/2023    Procedure: EXCISION, AV FISTULA;  Surgeon: FARIDEH Muñoz III, MD;  Location: [facility] OR [address] [place] [address];  Service: Vascular;  Laterality: Left;  LUE AV graft excision    INSERTION OF BIVENTRICULAR IMPLANTABLE CARDIOVERTER-DEFIBRILLATOR (ICD)  04/2021    INSERTION OF TUNNELED CENTRAL VENOUS CATHETER (CVC) WITH SUBCUTANEOUS PORT N/A 1/25/2023    Procedure: INSERTION, DUAL LUMEN CATHETER WITH PORT, WITH IMAGING GUIDANCE;  Surgeon: FARIDEH Muñoz III, MD;  [facility];  Service: Peripheral Vascular;  Laterality: N/A;  possinble permcath placment    NEPHRECTOMY-LAPAROSCOPIC Left 01/12/2016    PERCUTANEOUS TRANSLUMINAL ANGIOPLASTY OF ARTERIOVENOUS FISTULA Left 4/19/2023    Procedure: PTA, AV FISTULA;  Surgeon: FARIDEH Muñoz III, MD;  Location: [facility] CATH LAB;  Service: Peripheral Vascular;  Laterality: Left;    PERITONEAL CATHETER INSERTION      Permacath insertion  01/12/2017    PLACEMENT OF ARTERIOVENOUS GRAFT   12/28/2022    Procedure: INSERTION, GRAFT, ARTERIOVENOUS;  Surgeon: FARIDEH Muñoz III, MD;  Location: [facility] OR [facility] FLR;  Service: Peripheral Vascular;;    REMOVAL, GRAFT, ARTERIOVENOUS, UPPER EXTREMITY Left 1/25/2023    Procedure: REMOVAL, GRAFT, ARTERIOVENOUS, UPPER EXTREMITY;  Surgeon: FARIDEH Muñoz III, MD;  [facility];  Service: Peripheral Vascular;  Laterality: Left;    REVISION OF ARTERIOVENOUS FISTULA Left 5/1/2023    Procedure: REVISION, AV FISTULA;  Surgeon: FARIDEH Muñoz III, MD;  [facility];  Service: Peripheral Vascular;  Laterality: Left;  LUE AVG revision    REVISION OF PROCEDURE INVOLVING ARTERIOVENOUS GRAFT Left 12/28/2022    Procedure: REVISION, PROCEDURE INVOLVING ARTERIOVENOUS GRAFT;  Surgeon: FARIDEH Muñoz III, MD;  [facility];  Service: Peripheral Vascular;  Laterality: Left;    REVISION OF PROCEDURE INVOLVING ARTERIOVENOUS GRAFT Left 7/21/2023    Procedure: LEFT ARM ARTERIOVENOUS GRAFT EXCISION  AND LIGATION;  Surgeon: Obi Werner MD;  Location: [facility];  Service: Vascular;  Laterality: Left;  Left AVG excision and revision with interposition    SALPINGOOPHORECTOMY Right 2016    KJB---DAVINCI    TONSILLECTOMY      TUBAL LIGATION       Family History   Problem Relation Age of Onset    Hypertension Mother     Diabetes Father     Kidney disease Father     No Known Problems Sister     Heart disease Sister     No Known Problems Sister     No Known Problems Brother     No Known Problems Brother     Cataracts Maternal Aunt     No Known Problems Maternal Uncle     No Known Problems Paternal Aunt     No Known Problems Paternal Uncle     No Known Problems Maternal Grandmother     Diabetes Maternal Grandfather     No Known Problems Paternal Grandmother     No Known Problems Paternal Grandfather     No Known Problems Son     No Known Problems Son     No Known Problems Other     Breast cancer Neg Hx     Colon cancer Neg Hx     Cancer Neg Hx      Stroke Neg Hx     Amblyopia Neg Hx     Blindness Neg Hx     Glaucoma Neg Hx     Macular degeneration Neg Hx     Retinal detachment Neg Hx     Strabismus Neg Hx     Thyroid disease Neg Hx      Social History     Tobacco Use    Smoking status: Never     Passive exposure: Never    Smokeless tobacco: Never   Substance Use Topics    Alcohol use: No    Drug use: Yes     Types: Hydrocodone     Review of Systems   Unable to perform ROS: Mental status change       Physical Exam     Initial Vitals [01/02/24 0955]   BP Pulse Resp Temp SpO2   (!) 158/95 (!) 128 18 97.5 °F (36.4 °C) (!) 94 %      MAP       --         Physical Exam    Nursing note and vitals reviewed.  Constitutional: She appears well-developed and well-nourished.   HENT:   Head: Normocephalic.   Right Ear: External ear normal.   Left Ear: External ear normal.   Mouth/Throat: Oropharynx is clear and moist.   Eyes: EOM are normal. Pupils are equal, round, and reactive to light.   Neck:   No neck stiffness.    Normal range of motion.  Cardiovascular:  Regular rhythm.           Tachycardic    Pulmonary/Chest: Breath sounds normal. No respiratory distress. She has no wheezes.   Abdominal: Abdomen is soft. Bowel sounds are normal. She exhibits no distension. There is no abdominal tenderness.   Musculoskeletal:         General: No tenderness or edema. Normal range of motion.      Cervical back: Normal range of motion.     Neurological: She is alert. She has normal strength.   Moving all extremities. Following basic commands. Able to squeeze my hand, looking at  and able to recognize him at bedside, able to say her own name.    Skin: Skin is warm and dry. Capillary refill takes less than 2 seconds.   Psychiatric: She has a normal mood and affect. Thought content normal.         ED Course   Procedures  Labs Reviewed   COMPREHENSIVE METABOLIC PANEL - Abnormal; Notable for the following components:       Result Value    CO2 22 (*)     BUN 28 (*)     Creatinine  3.9 (*)     Total Protein 9.5 (*)     Albumin 3.1 (*)     Total Bilirubin 1.9 (*)     Alkaline Phosphatase 606 (*)     AST 63 (*)     eGFR 13 (*)     Anion Gap 17 (*)     All other components within normal limits   CBC W/ AUTO DIFFERENTIAL - Abnormal; Notable for the following components:    RBC 5.64 (*)     Hematocrit 49.8 (*)     MCH 24.8 (*)     MCHC 28.1 (*)     RDW 19.8 (*)     MPV 9.1 (*)     Immature Granulocytes 0.8 (*)     Immature Grans (Abs) 0.05 (*)     Lymph # 0.8 (*)     Lymph % 12.7 (*)     All other components within normal limits   SALICYLATE LEVEL - Abnormal; Notable for the following components:    Salicylate Lvl <5.0 (*)     All other components within normal limits   ACETAMINOPHEN LEVEL - Abnormal; Notable for the following components:    Acetaminophen (Tylenol), Serum <3.0 (*)     All other components within normal limits   LACTIC ACID, PLASMA - Abnormal; Notable for the following components:    Lactate (Lactic Acid) 2.9 (*)     All other components within normal limits   PROCALCITONIN - Abnormal; Notable for the following components:    Procalcitonin 1.38 (*)     All other components within normal limits   CULTURE, BLOOD   CULTURE, BLOOD   APTT   PROTIME-INR   TSH   AMMONIA   ALCOHOL,MEDICAL (ETHANOL)   URINALYSIS, REFLEX TO URINE CULTURE   DRUG SCREEN PANEL, URINE EMERGENCY   SARS-COV-2 RDRP GENE   POCT INFLUENZA A/B MOLECULAR   POCT GLUCOSE   POCT GLUCOSE MONITORING CONTINUOUS     EKG Readings: (Independently Interpreted)   EKG done at 10:11 a.m. showing sinus tachycardia rate of 132.  Right atrial enlargement.  Left axis deviation.  Flipped T-waves laterally.  QRS is 94 and QTC is 459.  Abnormal EKG.       Imaging Results              CT Head Without Contrast (Final result)  Result time 01/02/24 13:44:30      Final result by Cameron Pierce MD (01/02/24 13:44:30)                   Impression:      1. Interval development of low attenuating focus adjacent to the anterior horn of the left  lateral ventricle.  Findings suggest interval infarct.  There is serpiginous calcification adjacent to this focus, suggesting vascular calcification.  Correlation with current symptomatology is recommended as the focus of hypoattenuation remains age indeterminate.  MRI could be performed for further evaluation as warranted.  2. Sinus disease.      Electronically signed by: Cameron Pierce MD  Date:    01/02/2024  Time:    13:44               Narrative:    EXAMINATION:  CT HEAD WITHOUT CONTRAST    CLINICAL HISTORY:  Mental status change, unknown cause;    TECHNIQUE:  Low dose axial images were obtained through the head.  Coronal and sagittal reformations were also performed. Contrast was not administered.    COMPARISON:  10/29/2022    FINDINGS:  There is motion artifact.    There is generalized cerebral volume loss.  There is hypoattenuation in a periventricular fashion, likely sequela of chronic microvascular ischemic change.There is a focus of low attenuation adjacent to the anterior horn of the left lateral ventricle, not confidently identified on the previous examination.  There is an adjacent calcification, also new since that time.  There is no evidence of acute major vascular territory infarct, hemorrhage, or mass.  There is no hydrocephalus.  There are no abnormal extra-axial fluid collections.  There is mucous membrane thickening of the bilateral maxillary sinuses noting left maxillary sinus mucous retention cyst or polyp.  There are aerated secretions and patchy opacification of the ethmoid sinuses as well as within the right frontal sinus.  Otherwise, paranasal sinuses and mastoid air cells are clear, and there is no evidence of calvarial fracture.  The visualized soft tissues are unremarkable.                                       X-Ray Chest AP Portable (Final result)  Result time 01/02/24 12:17:39      Final result by Leeroy Vaca MD (01/02/24 12:17:39)                   Impression:      No  significant changes      Electronically signed by: Leeroy Vaca MD  Date:    01/02/2024  Time:    12:17               Narrative:    EXAMINATION:  XR CHEST AP PORTABLE    CLINICAL HISTORY:  atlered mental;    TECHNIQUE:  Single frontal view of the chest was performed.    COMPARISON:  N 11/01/2023 one    FINDINGS:  Cardiac defibrillation device and central venous catheter as before.  Cardiomegaly and mild diffuse edema.  No significant airspace consolidation or pleural effusion identified.                                       Medications   piperacillin-tazobactam (ZOSYN) 4.5 g in dextrose 5 % in water (D5W) 100 mL IVPB (MB+) (0 g Intravenous Stopped 1/2/24 1207)   aspirin EC tablet 81 mg (has no administration in time range)   atorvastatin tablet 40 mg (has no administration in time range)   acetaminophen tablet 650 mg (has no administration in time range)   albuterol-ipratropium 2.5 mg-0.5 mg/3 mL nebulizer solution 3 mL (has no administration in time range)   levothyroxine tablet 75 mcg (has no administration in time range)   famotidine tablet 20 mg (has no administration in time range)   apixaban tablet 2.5 mg (has no administration in time range)   lactated ringers bolus 250 mL (0 mLs Intravenous Stopped 1/2/24 1438)   vancomycin (VANCOCIN) 500 mg in dextrose 5 % in water (D5W) 100 mL IVPB (MB+) (0 mg Intravenous Stopped 1/2/24 1438)   acetaminophen suppository 650 mg (650 mg Rectal Given 1/2/24 1123)     Medical Decision Making  Patient arrived via family with AMS. physical exam remarkable for moving all extremities and confused and GCS 13    Given patient has normal O2, hypoxia less likely.  BgL is normal, making hypoglycemia, DKA, HHS less likely    DDX still includes, but is not limited to: EtOH, electrolyte abnormality (low/high Na, low/high Mg, hypocalcemia, hypophosphatemia), abnormal thyroid function, infection/sepsis, drug overdose, hypothermia, uremia, trauma, encephalopathy/encephalitis, CVA, SAH or  other ICH, postictal state 2/2 seizure, psych.    Labs  Urine  ECG  CXR  Head CT    Labs are notable for end-stage renal disease.  Lactic acid slightly elevated but did have dialysis right for arrival.  Small fluid bolus wide spectrum antibiotics.  Did have a fever.  Tylenol for patient.  Patient became more awake afterwards.  CT head concerning for a potential stroke.  Neurology consulted.  Patient was admitted further workup and management.  Full range of motion neck.  Doubt meningitis.    Amount and/or Complexity of Data Reviewed  Independent Historian: spouse     Details: See hpi   External Data Reviewed: notes.     Details: Per last admission at this facility on 11/1/23,   Pt is DNR and currently enrolled in hospice, but continues dialysis treatments.  Labs: ordered. Decision-making details documented in ED Course.  Radiology: ordered. Decision-making details documented in ED Course.  ECG/medicine tests: ordered and independent interpretation performed. Decision-making details documented in ED Course.    Risk  OTC drugs.  Decision regarding hospitalization.            Scribe Attestation:   Scribe #1: I performed the above scribed service and the documentation accurately describes the services I performed. I attest to the accuracy of the note.        ED Course as of 01/02/24 1505   Tue Jan 02, 2024   1402 Patient more awake and answering more questions.  Able to fully range neck with no stiffness.  Moving all extremities.  Still slightly confused however.  Family at bedside.  Discussed labs and imaging and admission to patient family. [BA]      ED Course User Index  [BA] Michael Torres MD I, michael torres, personally performed the services described in this documentation. All medical record entries made by the scribe were at my direction and in my presence. I have reviewed the chart and agree that the record reflects my personal performance and is accurate and complete.      Clinical  Impression:  Final diagnoses:  [R00.0] Tachycardia  [R41.82] Altered mental status, unspecified altered mental status type (Primary)  [R93.0] Abnormal head CT          ED Disposition Condition    Admit Stable                Michael Torres MD  01/02/24 7827

## 2024-01-02 NOTE — HPI
57 yo F with a PMHx of CHF with EF of 23%, ESRD on dialysis (TTS), Afib on OAC, AICD placement (currently disconnected per ), RCC with metastatic disease to lung and pancreas, presenting to the ED BIB  for altered mental status. History is provided by independent historian, pt's , reports for the last few days, she has been confused, intermittently falling asleep, experiencing auditory hallucinations, and talking to herself, and experiencing dark stools.  took her to dialysis this morning, was able to receive full session but brought her straight here afterwards for further evaluation. She currently has a port a cath to her chest wall for dialysis, and is anuric, no recent missed sessions. Her nephrologist is Dr Bender.  reports she ambulates w/ a walker, is able to hold a full conversation and answer questions at baseline. She has been able to recognize him the last few days.  also reports she received a dose of the COVID vaccine around 4 days ago.   In ER patient is febrile 100.8,chest X ray with no sign of pneumonia,Covid and flu are negative,patient received broad spectrum IV Abx,not able get urine,not urinating,blood culture is done,her HH is stable,head CT suspect for CVA,neurology is consulted and patient will have MRI brain and EEG.

## 2024-01-02 NOTE — ASSESSMENT & PLAN NOTE
"Patient is identified as having Combined Systolic and Diastolic heart failure that is Chronic. CHF is currently controlled. Latest ECHO performed and demonstrates- Results for orders placed during the hospital encounter of 05/08/23    Echo Saline Bubble? No    Interpretation Summary  · The left ventricle is normal in size with mild eccentric hypertrophy and severely decreased systolic function.  · The estimated ejection fraction is 23%.  · There is severe left ventricular global hypokinesis.  · Grade II left ventricular diastolic dysfunction.  · Moderate left atrial enlargement.  · Normal right ventricular size with normal right ventricular systolic function.  · Moderate right atrial enlargement.  · There is mild aortic valve stenosis.  · Aortic valve area is 1.58 cm2; peak velocity is 2.01 m/s; mean gradient is 8 mmHg.  · Moderate mitral regurgitation.  · Moderate to severe tricuspid regurgitation.  · Mild pulmonic regurgitation.  · Elevated central venous pressure (15 mmHg).  · The estimated PA systolic pressure is 47 mmHg.  · There is mild-moderate pulmonary hypertension.  · Moderate posterolateral pericardial effusion. Trivial under the RA  . Continue Beta Blocker and monitor clinical status closely. Monitor on telemetry. Patient is off CHF pathway.  Monitor strict Is&Os and daily weights.  Place on fluid restriction of 1.5 L. Cardiology has not been consulted. Continue to stress to patient importance of self efficacy and  on diet for CHF. Last BNP reviewed- and noted below No results for input(s): "BNP", "BNPTRIAGEBLO" in the last 168 hours.  "

## 2024-01-02 NOTE — ASSESSMENT & PLAN NOTE
Not sure etiology at this time.  In ER patient is febrile 100.8,chest X ray with no sign of pneumonia,Covid and flu are negative,patient received broad spectrum IV Abx,not able get urine,not urinating,blood culture is done,her HH is stable,head CT suspect for CVA,neurology is consulted and patient will have MRI brain and EEG.

## 2024-01-02 NOTE — ADMISSIONCARE
AdmissionCare    Guideline: Sepsis (and Other Febrile Illness without Focal Infection) - INPT, Inpatient    Based on the indications selected for the patient, the bed status of Admit to Inpatient was determined to be MET    The following indications were selected as present at the time of evaluation of the patient:      Altered mental status (ie, different from baseline) that is severe or persistent, as indicated by 1 or more of the following:   -     - Confusional state (eg, disorientation, difficulty following commands, deficit in attention) that persists (eg, for more than few hours) despite appropriate treatment (eg, of underlying cause)    AdmissionCare documentation entered by: Mara Fox    [facility], 27th edition, Copyright © 2023 [facility] All Rights Reserved.  [id number]

## 2024-01-02 NOTE — ASSESSMENT & PLAN NOTE
Nutrition consulted. Most recent weight and BMI monitored-     Measurements:  Wt Readings from Last 1 Encounters:   01/02/24 39.9 kg (88 lb)   Body mass index is 15.11 kg/m².    Patient has been screened and assessed by RD.    Malnutrition Type:  Context:    Level:      Malnutrition Characteristic Summary:       Interventions/Recommendations (treatment strategy):

## 2024-01-02 NOTE — SUBJECTIVE & OBJECTIVE
Past Medical History:   Diagnosis Date    Anemia     Anticoagulant long-term use     Atrial fibrillation     Bronchitis 03/01/2017    Cancer 2016    kidney cancer    CHF (congestive heart failure), NYHA class II, chronic, systolic     CMV (cytomegalovirus) antibody positive     Encounter for blood transfusion     ESRD (end stage renal disease)     Essential hypertension 09/23/2015    H/O herpes simplex type 2 infection     Herpes simplex type 1 antibody positive     History of kidney cancer     s/p left nephrectomy 1/2016    Hyperparathyroidism, unspecified     Hyperuricemia without signs of inflammatory arthritis and tophaceous disease     Kidney stones     LGSIL (low grade squamous intraepithelial dysplasia)     Myocardiopathy 07/21/2017    Prediabetes     Proteinuria     Renal disorder     Thyroid nodule     Urate nephropathy        Past Surgical History:   Procedure Laterality Date    BREAST CYST EXCISION      COLONOSCOPY N/A 11/12/2015    COLONOSCOPY N/A 3/12/2021    Procedure: COLONOSCOPY;  Surgeon: Brendon Lanier MD;  Location: [facility];  Service: Endoscopy;  Laterality: N/A;  covid test 3/9, labs prior, prep instr mailed -ml    EXCISION OF ARTERIOVENOUS FISTULA Left 9/27/2023    Procedure: EXCISION, AV FISTULA;  Surgeon: FARIDEH Muñoz III, MD;  Location: [facility] OR [address] [place] [address];  Service: Vascular;  Laterality: Left;  LUE AV graft excision    INSERTION OF BIVENTRICULAR IMPLANTABLE CARDIOVERTER-DEFIBRILLATOR (ICD)  04/2021    INSERTION OF TUNNELED CENTRAL VENOUS CATHETER (CVC) WITH SUBCUTANEOUS PORT N/A 1/25/2023    Procedure: INSERTION, DUAL LUMEN CATHETER WITH PORT, WITH IMAGING GUIDANCE;  Surgeon: FARIDEH Muñoz III, MD;  [facility];  Service: Peripheral Vascular;  Laterality: N/A;  possinble permcath placment    NEPHRECTOMY-LAPAROSCOPIC Left 01/12/2016    PERCUTANEOUS TRANSLUMINAL ANGIOPLASTY OF ARTERIOVENOUS FISTULA Left 4/19/2023    Procedure: PTA, AV FISTULA;  Surgeon: FARIDEH Muñoz  III, MD;  Location: [facility] CATH [facility];  Service: Peripheral Vascular;  Laterality: Left;    PERITONEAL CATHETER INSERTION      Permacath insertion  01/12/2017    PLACEMENT OF ARTERIOVENOUS GRAFT  12/28/2022    Procedure: INSERTION, GRAFT, ARTERIOVENOUS;  Surgeon: FARIDEH Muñoz III, MD;  Location: [facility] OR [facility];  Service: Peripheral Vascular;;    REMOVAL, GRAFT, ARTERIOVENOUS, UPPER EXTREMITY Left 1/25/2023    Procedure: REMOVAL, GRAFT, ARTERIOVENOUS, UPPER EXTREMITY;  Surgeon: FARIDEH Muñoz III, MD;  [facility];  Service: Peripheral Vascular;  Laterality: Left;    REVISION OF ARTERIOVENOUS FISTULA Left 5/1/2023    Procedure: REVISION, AV FISTULA;  Surgeon: FARIDEH Muñoz III, MD;  [facility];  Service: Peripheral Vascular;  Laterality: Left;  LUE AVG revision    REVISION OF PROCEDURE INVOLVING ARTERIOVENOUS GRAFT Left 12/28/2022    Procedure: REVISION, PROCEDURE INVOLVING ARTERIOVENOUS GRAFT;  Surgeon: FARIDEH Muñoz III, MD;  [facility];  Service: Peripheral Vascular;  Laterality: Left;    REVISION OF PROCEDURE INVOLVING ARTERIOVENOUS GRAFT Left 7/21/2023    Procedure: LEFT ARM ARTERIOVENOUS GRAFT EXCISION  AND LIGATION;  Surgeon: Obi Wenrer MD;  Location: [facility] OR;  Service: Vascular;  Laterality: Left;  Left AVG excision and revision with interposition    SALPINGOOPHORECTOMY Right 2016    KJB---DAVINCI    TONSILLECTOMY      TUBAL LIGATION         Review of patient's allergies indicates:   Allergen Reactions    Coreg [carvedilol] Other (See Comments)     Nausea/vomiting    Allopurinol      Other reaction(s): abnormal transaminases       Current Facility-Administered Medications on File Prior to Encounter   Medication    0.9%  NaCl infusion     Current Outpatient Medications on File Prior to Encounter   Medication Sig    acetaminophen (TYLENOL) 500 MG tablet Take 500 mg by mouth every 6 (six) hours as needed for Pain.    albuterol-ipratropium (DUO-NEB)  2.5 mg-0.5 mg/3 mL nebulizer solution Take by nebulization.    apixaban (ELIQUIS) 2.5 mg Tab Take 1 tablet (2.5 mg total) by mouth 2 (two) times daily.    bisacodyL (DULCOLAX) 10 mg Supp Place 10 mg rectally.    cabozantinib (CABOMETYX) 60 mg Tab TAKE ONE TABLET BY MOUTH ONCE DAILY AT THE SAME TIME ON AN EMPTY STOMACH AT LEAST 1 HOUR BEFORE OR 2 HOURS AFTER EATING. AVOID GRAPEFRUIT PRODUCTS    cloNIDine 0.3 mg/24 hr td ptwk (CATAPRES) 0.3 mg/24 hr Place 1 patch onto the skin every 7 days.    cyclobenzaprine (FLEXERIL) 5 MG tablet TAKE 1 TABLET BY MOUTH DAILY AS NEEDED FOR MUSCLE SPASMS.    ENTRESTO 49-51 mg per tablet TAKE 1 TABLET BY MOUTH TWICE A DAY    epoetin david-epbx (RETACRIT INJ) Epoetin david - epbx (Retacrit)    fluticasone propionate (FLONASE) 50 mcg/actuation nasal spray 1 spray (50 mcg total) by Each Nostril route 2 (two) times daily as needed (ear fullness).    hyoscyamine (LEVSIN/SL) 0.125 mg Subl Place under the tongue.    lanthanum (FOSRENOL) 1000 MG chewable tablet Take 1 tablet by mouth.    levothyroxine (SYNTHROID) 75 MCG tablet Take 1 tablet (75 mcg total) by mouth once daily.    lidocaine-prilocaine (EMLA) cream APPLY ATLEAST 30 MINUTES BEFORE TREATMENT 3 TIMES A WEEK    LORAZEPAM INTENSOL 2 mg/mL Conc Take by mouth.    metoprolol succinate (TOPROL-XL) 100 MG 24 hr tablet Take 1/2 tablet (50mg) once daily    mirtazapine (REMERON) 15 MG tablet Take 15 mg by mouth every evening.    morphine 100 mg/5 mL (20 mg/mL) concentrated solution Take by mouth.    mv,Ca,min-folic acid-vit K1 (ONE-A-DAY WOMEN'S 50 PLUS) 400-20 mcg Tab Take 1 tablet by mouth once daily.    naloxone (NARCAN) 1 mg/mL injection 2 mg (1 mg per nostril) by Nasal route as needed for opioid overdose; may repeat in 3 to 5 minutes if not effective. Call 911    nystatin (MYCOSTATIN) cream SMARTSIG:sparingly Topical 2-4 Times Daily PRN    ondansetron (ZOFRAN-ODT) 4 MG TbDL Take by mouth.    pantoprazole (PROTONIX) 40 MG tablet Take 1  tablet (40 mg total) by mouth 2 (two) times daily.    traMADoL (ULTRAM) 50 mg tablet Take 1 tablet (50 mg total) by mouth every 12 (twelve) hours as needed for Pain.    [DISCONTINUED] amLODIPine (NORVASC) 5 MG tablet TAKE 1 TABLET BY MOUTH EVERY DAY     Family History       Problem Relation (Age of Onset)    Cataracts Maternal Aunt    Diabetes Father, Maternal Grandfather    Heart disease Sister    Hypertension Mother    Kidney disease Father    No Known Problems Sister, Sister, Brother, Brother, Maternal Uncle, Paternal Aunt, Paternal Uncle, Maternal Grandmother, Paternal Grandmother, Paternal Grandfather, Son, Son, Other          Tobacco Use    Smoking status: Never     Passive exposure: Never    Smokeless tobacco: Never   Substance and Sexual Activity    Alcohol use: No    Drug use: Yes     Types: Hydrocodone    Sexual activity: Not Currently     Review of Systems   Constitutional:  Positive for activity change and appetite change.   HENT:  Negative for congestion and dental problem.    Eyes:  Negative for discharge and itching.   Respiratory:  Negative for apnea and chest tightness.    Cardiovascular:  Negative for chest pain and leg swelling.   Gastrointestinal:  Negative for abdominal distention.   Endocrine: Negative for cold intolerance and heat intolerance.   Genitourinary:  Negative for difficulty urinating and dyspareunia.   Musculoskeletal:  Negative for arthralgias and back pain.   Skin:  Negative for color change and pallor.   Allergic/Immunologic: Negative for environmental allergies and food allergies.   Neurological:  Positive for weakness. Negative for dizziness and facial asymmetry.   Hematological:  Negative for adenopathy. Does not bruise/bleed easily.   Psychiatric/Behavioral:  Positive for confusion.      Objective:     Vital Signs (Most Recent):  Temp: (!) 100.8 °F (38.2 °C) (01/02/24 1123)  Pulse: (!) 116 (01/02/24 1231)  Resp: (!) 29 (01/02/24 1231)  BP: 103/72 (01/02/24 1231)  SpO2: (!) 93  % (01/02/24 1231) Vital Signs (24h Range):  Temp:  [97.5 °F (36.4 °C)-100.8 °F (38.2 °C)] 100.8 °F (38.2 °C)  Pulse:  [116-128] 116  Resp:  [18-29] 29  SpO2:  [93 %-97 %] 93 %  BP: (103-158)/(72-95) 103/72     Weight: 39.9 kg (88 lb)  Body mass index is 15.11 kg/m².     Physical Exam  HENT:      Head: Normocephalic.      Nose: Nose normal.      Mouth/Throat:      Mouth: Mucous membranes are dry.   Eyes:      Extraocular Movements: Extraocular movements intact.      Pupils: Pupils are equal, round, and reactive to light.   Cardiovascular:      Rate and Rhythm: Tachycardia present. Rhythm irregular.      Heart sounds: No murmur heard.  Pulmonary:      Effort: No respiratory distress.   Abdominal:      General: There is no distension.   Musculoskeletal:         General: No swelling or deformity.   Skin:     Coloration: Skin is not jaundiced.      Findings: No bruising.   Neurological:      Mental Status: She is alert.      Comments: Not able complete duo to confusion.   Psychiatric:      Comments: Confused,              CRANIAL NERVES     CN III, IV, VI   Pupils are equal, round, and reactive to light.       Significant Labs: All pertinent labs within the past 24 hours have been reviewed.  BMP:   Recent Labs   Lab 01/02/24  1023   GLU 75      K 5.0   CL 98   CO2 22*   BUN 28*   CREATININE 3.9*   CALCIUM 9.8     CBC:   Recent Labs   Lab 01/02/24  1023   WBC 6.14   HGB 14.0   HCT 49.8*        CMP:   Recent Labs   Lab 01/02/24  1023      K 5.0   CL 98   CO2 22*   GLU 75   BUN 28*   CREATININE 3.9*   CALCIUM 9.8   PROT 9.5*   ALBUMIN 3.1*   BILITOT 1.9*   ALKPHOS 606*   AST 63*   ALT 28   ANIONGAP 17*       Significant Imaging: I have reviewed all pertinent imaging results/findings within the past 24 hours.

## 2024-01-02 NOTE — CLINICAL REVIEW
Sepsis Screen (most recent)       Sepsis Screen () - 01/02/24 1522       Is the patient's history or complaint suggestive of a possible infection? Yes  -    Are there at least two of the following signs and symptoms present? Yes   Temp 100.8-recieved Tylenol -    Sepsis signs/symptoms - Hyper or Hypothermia Hyperthermia >100.4 or Hypothermia < 96.8  -    Sepsis signs/symptoms - Tachycardia Tachycardia     >90  -    Sepsis signs/symptoms - Tachypnea Tachypnea     >20  -    Sepsis signs/symptoms - Altered Mental Status Altered Mental Status  -    Are any of the following organ dysfunction criteria present and not considered to be due to a chronic condition? Yes   ESRD-HD -    Organ Dysfunction Criteria Lactate > 2.0  -    Initiate Sepsis Protocol No  -    Reason sepsis not considered Pt. receiving appropriate management  -              User Key  (r) = Recorded By, (t) = Taken By, (c) = Cosigned By      Initials Name    Kandis Tenorio RN

## 2024-01-02 NOTE — CONSULTS
Pt known to me with ersd on chronic hd TTS hx of ca of kidneys  Hx of chf a fib proteinuria was on hospice but doing better and pt recinded  Here at [facility] with acute AMS    Renal consult requested to help with care    Pt somewhat altered so HPI given by family sx present x a few days this week   Past Medical History:   Diagnosis Date    Anemia     Anticoagulant long-term use     Atrial fibrillation     Bronchitis 03/01/2017    Cancer 2016    kidney cancer    CHF (congestive heart failure), NYHA class II, chronic, systolic     CMV (cytomegalovirus) antibody positive     Encounter for blood transfusion     ESRD (end stage renal disease)     Essential hypertension 09/23/2015    H/O herpes simplex type 2 infection     Herpes simplex type 1 antibody positive     History of kidney cancer     s/p left nephrectomy 1/2016    Hyperparathyroidism, unspecified     Hyperuricemia without signs of inflammatory arthritis and tophaceous disease     Kidney stones     LGSIL (low grade squamous intraepithelial dysplasia)     Myocardiopathy 07/21/2017    Prediabetes     Proteinuria     Renal disorder     Thyroid nodule     Urate nephropathy      Past Surgical History:   Procedure Laterality Date    BREAST CYST EXCISION      COLONOSCOPY N/A 11/12/2015    COLONOSCOPY N/A 3/12/2021    Procedure: COLONOSCOPY;  Surgeon: Brendon Lanier MD;  Location: [facility];  Service: Endoscopy;  Laterality: N/A;  covid test 3/9, labs prior, prep instr mailed -ml    EXCISION OF ARTERIOVENOUS FISTULA Left 9/27/2023    Procedure: EXCISION, AV FISTULA;  Surgeon: FARIDEH Muñoz III, MD;  Location: [facility] OR [address] [place] [address];  Service: Vascular;  Laterality: Left;  LUE AV graft excision    INSERTION OF BIVENTRICULAR IMPLANTABLE CARDIOVERTER-DEFIBRILLATOR (ICD)  04/2021    INSERTION OF TUNNELED CENTRAL VENOUS CATHETER (CVC) WITH SUBCUTANEOUS PORT N/A 1/25/2023    Procedure: INSERTION, DUAL LUMEN CATHETER WITH PORT, WITH IMAGING GUIDANCE;  Surgeon: FARIDEH Muñoz III,  MD;  [facility];  Service: Peripheral Vascular;  Laterality: N/A;  possinble permcath placment    NEPHRECTOMY-LAPAROSCOPIC Left 01/12/2016    PERCUTANEOUS TRANSLUMINAL ANGIOPLASTY OF ARTERIOVENOUS FISTULA Left 4/19/2023    Procedure: PTA, AV FISTULA;  Surgeon: FARIDEH Muñoz III, MD;  Location: [facility] CATH LAB;  Service: Peripheral Vascular;  Laterality: Left;    PERITONEAL CATHETER INSERTION      Permacath insertion  01/12/2017    PLACEMENT OF ARTERIOVENOUS GRAFT  12/28/2022    Procedure: INSERTION, GRAFT, ARTERIOVENOUS;  Surgeon: FARIDEH Muñoz III, MD;  [facility];  Service: Peripheral Vascular;;    REMOVAL, GRAFT, ARTERIOVENOUS, UPPER EXTREMITY Left 1/25/2023    Procedure: REMOVAL, GRAFT, ARTERIOVENOUS, UPPER EXTREMITY;  Surgeon: FARIDEH Muñoz III, MD;  [facility];  Service: Peripheral Vascular;  Laterality: Left;    REVISION OF ARTERIOVENOUS FISTULA Left 5/1/2023    Procedure: REVISION, AV FISTULA;  Surgeon: FARIDEH Muñoz III, MD;  [address];  Service: Peripheral Vascular;  Laterality: Left;  LUE AVG revision    REVISION OF PROCEDURE INVOLVING ARTERIOVENOUS GRAFT Left 12/28/2022    Procedure: REVISION, PROCEDURE INVOLVING ARTERIOVENOUS GRAFT;  Surgeon: FARIDEH Muñoz III, MD;  [facility];  Service: Peripheral Vascular;  Laterality: Left;    REVISION OF PROCEDURE INVOLVING ARTERIOVENOUS GRAFT Left 7/21/2023    Procedure: LEFT ARM ARTERIOVENOUS GRAFT EXCISION  AND LIGATION;  Surgeon: Obi Werner MD;  Location: [facility];  Service: Vascular;  Laterality: Left;  Left AVG excision and revision with interposition    SALPINGOOPHORECTOMY Right 2016    KJB---DAVINCI    TONSILLECTOMY      TUBAL LIGATION       Social History     Socioeconomic History    Marital status:     Number of children: 2   Occupational History    Occupation: [employer]     Employer: [id number]   Tobacco Use    Smoking status: Never     Passive  exposure: Never    Smokeless tobacco: Never   Substance and Sexual Activity    Alcohol use: No    Drug use: Yes     Types: Hydrocodone    Sexual activity: Not Currently     Social Determinants of Health     Financial Resource Strain: Low Risk  (9/28/2023)    Overall Financial Resource Strain (CARDIA)     Difficulty of Paying Living Expenses: Not hard at all   Food Insecurity: No Food Insecurity (9/28/2023)    Hunger Vital Sign     Worried About Running Out of Food in the Last Year: Never true     Ran Out of Food in the Last Year: Never true   Transportation Needs: No Transportation Needs (9/28/2023)    PRAPARE - Transportation     Lack of Transportation (Medical): No     Lack of Transportation (Non-Medical): No   Physical Activity: Inactive (9/28/2023)    Exercise Vital Sign     Days of Exercise per Week: 0 days     Minutes of Exercise per Session: 0 min   Stress: No Stress Concern Present (9/28/2023)    [employer] [place] [employer] - Occupational Stress Questionnaire     Feeling of Stress : Not at all   Social Connections: Moderately Integrated (9/28/2023)    Social Connection and Isolation Panel [NHANES]     Frequency of Communication with Friends and Family: More than three times a week     Frequency of Social Gatherings with Friends and Family: More than three times a week     Attends [employer] Services: More than 4 times per year     Active Member of Clubs or Organizations: No     Attends Club or Organization Meetings: Never     Marital Status:    Housing Stability: Low Risk  (9/28/2023)    Housing Stability Vital Sign     Unable to Pay for Housing in the Last Year: No     Number of Places Lived in the Last Year: 1     Unstable Housing in the Last Year: No     Family History   Problem Relation Age of Onset    Hypertension Mother     Diabetes Father     Kidney disease Father     No Known Problems Sister     Heart disease Sister     No Known Problems Sister     No Known Problems Brother     No  Known Problems Brother     Cataracts Maternal Aunt     No Known Problems Maternal Uncle     No Known Problems Paternal Aunt     No Known Problems Paternal Uncle     No Known Problems Maternal Grandmother     Diabetes Maternal Grandfather     No Known Problems Paternal Grandmother     No Known Problems Paternal Grandfather     No Known Problems Son     No Known Problems Son     No Known Problems Other     Breast cancer Neg Hx     Colon cancer Neg Hx     Cancer Neg Hx     Stroke Neg Hx     Amblyopia Neg Hx     Blindness Neg Hx     Glaucoma Neg Hx     Macular degeneration Neg Hx     Retinal detachment Neg Hx     Strabismus Neg Hx     Thyroid disease Neg Hx        Review of patient's allergies indicates:   Allergen Reactions    Coreg [carvedilol] Other (See Comments)     Nausea/vomiting    Allopurinol      Other reaction(s): abnormal transaminases       Current Facility-Administered Medications   Medication    acetaminophen tablet 650 mg    albuterol-ipratropium 2.5 mg-0.5 mg/3 mL nebulizer solution 3 mL    apixaban tablet 2.5 mg    aspirin EC tablet 81 mg    atorvastatin tablet 40 mg    famotidine tablet 20 mg    [START ON 1/3/2024] levothyroxine tablet 75 mcg    piperacillin-tazobactam (ZOSYN) 4.5 g in dextrose 5 % in water (D5W) 100 mL IVPB (MB+)     Current Outpatient Medications   Medication Sig    acetaminophen (TYLENOL) 500 MG tablet Take 500 mg by mouth every 6 (six) hours as needed for Pain.    albuterol-ipratropium (DUO-NEB) 2.5 mg-0.5 mg/3 mL nebulizer solution Take by nebulization.    apixaban (ELIQUIS) 2.5 mg Tab Take 1 tablet (2.5 mg total) by mouth 2 (two) times daily.    bisacodyL (DULCOLAX) 10 mg Supp Place 10 mg rectally.    cabozantinib (CABOMETYX) 60 mg Tab TAKE ONE TABLET BY MOUTH ONCE DAILY AT THE SAME TIME ON AN EMPTY STOMACH AT LEAST 1 HOUR BEFORE OR 2 HOURS AFTER EATING. AVOID GRAPEFRUIT PRODUCTS    cloNIDine 0.3 mg/24 hr td ptwk (CATAPRES) 0.3 mg/24 hr Place 1 patch onto the skin every 7 days.     cyclobenzaprine (FLEXERIL) 5 MG tablet TAKE 1 TABLET BY MOUTH DAILY AS NEEDED FOR MUSCLE SPASMS.    ENTRESTO 49-51 mg per tablet TAKE 1 TABLET BY MOUTH TWICE A DAY    epoetin david-epbx (RETACRIT INJ) Epoetin david - epbx (Retacrit)    fluticasone propionate (FLONASE) 50 mcg/actuation nasal spray 1 spray (50 mcg total) by Each Nostril route 2 (two) times daily as needed (ear fullness).    hyoscyamine (LEVSIN/SL) 0.125 mg Subl Place under the tongue.    lanthanum (FOSRENOL) 1000 MG chewable tablet Take 1 tablet by mouth.    levothyroxine (SYNTHROID) 75 MCG tablet Take 1 tablet (75 mcg total) by mouth once daily.    lidocaine-prilocaine (EMLA) cream APPLY ATLEAST 30 MINUTES BEFORE TREATMENT 3 TIMES A WEEK    LORAZEPAM INTENSOL 2 mg/mL Conc Take by mouth.    metoprolol succinate (TOPROL-XL) 100 MG 24 hr tablet Take 1/2 tablet (50mg) once daily    mirtazapine (REMERON) 15 MG tablet Take 15 mg by mouth every evening.    morphine 100 mg/5 mL (20 mg/mL) concentrated solution Take by mouth.    mv,Ca,min-folic acid-vit K1 (ONE-A-DAY WOMEN'S 50 PLUS) 400-20 mcg Tab Take 1 tablet by mouth once daily.    naloxone (NARCAN) 1 mg/mL injection 2 mg (1 mg per nostril) by Nasal route as needed for opioid overdose; may repeat in 3 to 5 minutes if not effective. Call 911    nystatin (MYCOSTATIN) cream SMARTSIG:sparingly Topical 2-4 Times Daily PRN    ondansetron (ZOFRAN-ODT) 4 MG TbDL Take by mouth.    pantoprazole (PROTONIX) 40 MG tablet Take 1 tablet (40 mg total) by mouth 2 (two) times daily.    traMADoL (ULTRAM) 50 mg tablet Take 1 tablet (50 mg total) by mouth every 12 (twelve) hours as needed for Pain.     Facility-Administered Medications Ordered in Other Encounters   Medication    0.9%  NaCl infusion       LABS    Recent Results (from the past 24 hour(s))   POCT glucose    Collection Time: 01/02/24 10:21 AM   Result Value Ref Range    POCT Glucose 86 70 - 110 mg/dL   Comprehensive metabolic panel    Collection Time: 01/02/24  10:23 AM   Result Value Ref Range    Sodium 137 136 - 145 mmol/L    Potassium 5.0 3.5 - 5.1 mmol/L    Chloride 98 95 - 110 mmol/L    CO2 22 (L) 23 - 29 mmol/L    Glucose 75 70 - 110 mg/dL    BUN 28 (H) 6 - 20 mg/dL    Creatinine 3.9 (H) 0.5 - 1.4 mg/dL    Calcium 9.8 8.7 - 10.5 mg/dL    Total Protein 9.5 (H) 6.0 - 8.4 g/dL    Albumin 3.1 (L) 3.5 - 5.2 g/dL    Total Bilirubin 1.9 (H) 0.1 - 1.0 mg/dL    Alkaline Phosphatase 606 (H) 55 - 135 U/L    AST 63 (H) 10 - 40 U/L    ALT 28 10 - 44 U/L    eGFR 13 (A) >60 mL/min/1.73 m^2    Anion Gap 17 (H) 8 - 16 mmol/L   CBC auto differential    Collection Time: 01/02/24 10:23 AM   Result Value Ref Range    WBC 6.14 3.90 - 12.70 K/uL    RBC 5.64 (H) 4.00 - 5.40 M/uL    Hemoglobin 14.0 12.0 - 16.0 g/dL    Hematocrit 49.8 (H) 37.0 - 48.5 %    MCV 88 82 - 98 fL    MCH 24.8 (L) 27.0 - 31.0 pg    MCHC 28.1 (L) 32.0 - 36.0 g/dL    RDW 19.8 (H) 11.5 - 14.5 %    Platelets 255 150 - 450 K/uL    MPV 9.1 (L) 9.2 - 12.9 fL    Immature Granulocytes 0.8 (H) 0.0 - 0.5 %    Gran # (ANC) 4.5 1.8 - 7.7 K/uL    Immature Grans (Abs) 0.05 (H) 0.00 - 0.04 K/uL    Lymph # 0.8 (L) 1.0 - 4.8 K/uL    Mono # 0.6 0.3 - 1.0 K/uL    Eos # 0.2 0.0 - 0.5 K/uL    Baso # 0.07 0.00 - 0.20 K/uL    nRBC 0 0 /100 WBC    Gran % 72.7 38.0 - 73.0 %    Lymph % 12.7 (L) 18.0 - 48.0 %    Mono % 9.8 4.0 - 15.0 %    Eosinophil % 2.9 0.0 - 8.0 %    Basophil % 1.1 0.0 - 1.9 %    Differential Method Automated    APTT    Collection Time: 01/02/24 10:23 AM   Result Value Ref Range    aPTT 28.3 21.0 - 32.0 sec   Protime-INR    Collection Time: 01/02/24 10:23 AM   Result Value Ref Range    Prothrombin Time 11.6 9.0 - 12.5 sec    INR 1.1 0.8 - 1.2   TSH    Collection Time: 01/02/24 10:26 AM   Result Value Ref Range    TSH 1.079 0.400 - 4.000 uIU/mL   Ammonia    Collection Time: 01/02/24 10:26 AM   Result Value Ref Range    Ammonia 38 10 - 50 umol/L   Ethanol    Collection Time: 01/02/24 10:26 AM   Result Value Ref Range     Alcohol, Serum <10 <10 mg/dL   Salicylate level    Collection Time: 01/02/24 10:26 AM   Result Value Ref Range    Salicylate Lvl <5.0 (L) 15.0 - 30.0 mg/dL   Acetaminophen level    Collection Time: 01/02/24 10:26 AM   Result Value Ref Range    Acetaminophen (Tylenol), Serum <3.0 (L) 10.0 - 20.0 ug/mL   POCT COVID-19 Rapid Screening    Collection Time: 01/02/24 10:42 AM   Result Value Ref Range    POC Rapid COVID Negative Negative     Acceptable Yes    POCT Influenza A/B Molecular    Collection Time: 01/02/24 10:42 AM   Result Value Ref Range    POC Molecular Influenza A Ag Negative Negative, Not Reported    POC Molecular Influenza B Ag Negative Negative, Not Reported     Acceptable Yes    Lactic acid, plasma    Collection Time: 01/02/24 11:06 AM   Result Value Ref Range    Lactate (Lactic Acid) 2.9 (H) 0.5 - 2.2 mmol/L   Procalcitonin    Collection Time: 01/02/24 11:06 AM   Result Value Ref Range    Procalcitonin 1.38 (H) <0.25 ng/mL   ]    No intake/output data recorded.    Vitals:    01/02/24 1431 01/02/24 1438 01/02/24 1445 01/02/24 1624   BP: 119/83      Pulse: (!) 115 (!) 113  110   Resp: (!) 26 (!) 23  (!) 26   Temp:   99.6 °F (37.6 °C)    TempSrc:   Rectal    SpO2: 96% 97%  95%   Weight:       Height:           No Jvd, Thyromegaly or Lymphadenopathy  Lungs: Fairly clear anteriorly and laterally  Cor: RRR no G or rubs  Abd: Soft benign good bowel sounds non tender  Ext: No E C C    A)  ESRD  Htn  Anemia   met renal ca   AMS   ? New CVA vs mets was on hospice   2nd hyperpth       P)    Renal Diet  Home meds  Protect access  HD  EPO   Binders  Adjust all meds to the degree of renal fx  Close follow up I/O and weights  Maintain Hydration   HD in am

## 2024-01-02 NOTE — ASSESSMENT & PLAN NOTE
Creatine stable for now. BMP reviewed- noted Estimated Creatinine Clearance: 9.8 mL/min (A) (based on SCr of 3.9 mg/dL (H)). according to latest data. Based on current GFR, CKD stage is end stage.  Monitor UOP and serial BMP and adjust therapy as needed. Renally dose meds. Avoid nephrotoxic medications and procedures.

## 2024-01-03 PROBLEM — I63.9 CVA (CEREBRAL VASCULAR ACCIDENT): Status: ACTIVE | Noted: 2024-01-03

## 2024-01-03 LAB
ANION GAP SERPL CALC-SCNC: 13 MMOL/L (ref 8–16)
BASOPHILS # BLD AUTO: 0.11 K/UL (ref 0–0.2)
BASOPHILS NFR BLD: 1.9 % (ref 0–1.9)
BUN SERPL-MCNC: 39 MG/DL (ref 6–20)
CALCIUM SERPL-MCNC: 10.9 MG/DL (ref 8.7–10.5)
CHLORIDE SERPL-SCNC: 101 MMOL/L (ref 95–110)
CO2 SERPL-SCNC: 23 MMOL/L (ref 23–29)
CREAT SERPL-MCNC: 5.3 MG/DL (ref 0.5–1.4)
DIFFERENTIAL METHOD BLD: ABNORMAL
EOSINOPHIL # BLD AUTO: 0.4 K/UL (ref 0–0.5)
EOSINOPHIL NFR BLD: 6.4 % (ref 0–8)
ERYTHROCYTE [DISTWIDTH] IN BLOOD BY AUTOMATED COUNT: 19.6 % (ref 11.5–14.5)
EST. GFR  (NO RACE VARIABLE): 9 ML/MIN/1.73 M^2
GLUCOSE SERPL-MCNC: 73 MG/DL (ref 70–110)
HCT VFR BLD AUTO: 47 % (ref 37–48.5)
HGB BLD-MCNC: 13.3 G/DL (ref 12–16)
IMM GRANULOCYTES # BLD AUTO: 0.02 K/UL (ref 0–0.04)
IMM GRANULOCYTES NFR BLD AUTO: 0.3 % (ref 0–0.5)
LYMPHOCYTES # BLD AUTO: 0.7 K/UL (ref 1–4.8)
LYMPHOCYTES NFR BLD: 12.7 % (ref 18–48)
MCH RBC QN AUTO: 25.2 PG (ref 27–31)
MCHC RBC AUTO-ENTMCNC: 28.3 G/DL (ref 32–36)
MCV RBC AUTO: 89 FL (ref 82–98)
MONOCYTES # BLD AUTO: 0.9 K/UL (ref 0.3–1)
MONOCYTES NFR BLD: 14.9 % (ref 4–15)
NEUTROPHILS # BLD AUTO: 3.7 K/UL (ref 1.8–7.7)
NEUTROPHILS NFR BLD: 63.8 % (ref 38–73)
NRBC BLD-RTO: 0 /100 WBC
PLATELET # BLD AUTO: 256 K/UL (ref 150–450)
PMV BLD AUTO: 9.2 FL (ref 9.2–12.9)
POTASSIUM SERPL-SCNC: 4.4 MMOL/L (ref 3.5–5.1)
RBC # BLD AUTO: 5.27 M/UL (ref 4–5.4)
SODIUM SERPL-SCNC: 137 MMOL/L (ref 136–145)
WBC # BLD AUTO: 5.76 K/UL (ref 3.9–12.7)

## 2024-01-03 PROCEDURE — 92610 EVALUATE SWALLOWING FUNCTION: CPT

## 2024-01-03 PROCEDURE — 63600175 PHARM REV CODE 636 W HCPCS: Performed by: EMERGENCY MEDICINE

## 2024-01-03 PROCEDURE — 36415 COLL VENOUS BLD VENIPUNCTURE: CPT | Performed by: HOSPITALIST

## 2024-01-03 PROCEDURE — 11000001 HC ACUTE MED/SURG PRIVATE ROOM

## 2024-01-03 PROCEDURE — 99223 1ST HOSP IP/OBS HIGH 75: CPT | Mod: GV,HPC,, | Performed by: REGISTERED NURSE

## 2024-01-03 PROCEDURE — 80048 BASIC METABOLIC PNL TOTAL CA: CPT | Performed by: HOSPITALIST

## 2024-01-03 PROCEDURE — 25000003 PHARM REV CODE 250: Performed by: HOSPITALIST

## 2024-01-03 PROCEDURE — 99497 ADVNCD CARE PLAN 30 MIN: CPT | Mod: 25,GV,HPC, | Performed by: REGISTERED NURSE

## 2024-01-03 PROCEDURE — 99900035 HC TECH TIME PER 15 MIN (STAT)

## 2024-01-03 PROCEDURE — 25000003 PHARM REV CODE 250: Performed by: EMERGENCY MEDICINE

## 2024-01-03 PROCEDURE — 94761 N-INVAS EAR/PLS OXIMETRY MLT: CPT

## 2024-01-03 PROCEDURE — 85025 COMPLETE CBC W/AUTO DIFF WBC: CPT | Performed by: HOSPITALIST

## 2024-01-03 RX ORDER — LOPERAMIDE HYDROCHLORIDE 2 MG/1
2 CAPSULE ORAL 4 TIMES DAILY PRN
Status: DISCONTINUED | OUTPATIENT
Start: 2024-01-03 | End: 2024-01-05 | Stop reason: HOSPADM

## 2024-01-03 RX ADMIN — MUPIROCIN: 20 OINTMENT TOPICAL at 09:01

## 2024-01-03 RX ADMIN — MUPIROCIN 1 G: 20 OINTMENT TOPICAL at 08:01

## 2024-01-03 RX ADMIN — APIXABAN 2.5 MG: 2.5 TABLET, FILM COATED ORAL at 08:01

## 2024-01-03 RX ADMIN — PIPERACILLIN AND TAZOBACTAM 4.5 G: 4; .5 INJECTION, POWDER, LYOPHILIZED, FOR SOLUTION INTRAVENOUS; PARENTERAL at 03:01

## 2024-01-03 RX ADMIN — APIXABAN 2.5 MG: 2.5 TABLET, FILM COATED ORAL at 09:01

## 2024-01-03 RX ADMIN — FAMOTIDINE 20 MG: 20 TABLET ORAL at 09:01

## 2024-01-03 RX ADMIN — ATORVASTATIN CALCIUM 40 MG: 40 TABLET, FILM COATED ORAL at 09:01

## 2024-01-03 RX ADMIN — LEVOTHYROXINE SODIUM 75 MCG: 75 TABLET ORAL at 05:01

## 2024-01-03 RX ADMIN — ASPIRIN 81 MG: 81 TABLET, COATED ORAL at 09:01

## 2024-01-03 NOTE — PT/OT/SLP EVAL
Speech Language Pathology Evaluation  Bedside Swallow    Patient Name:  Eva Bloom   MRN:  [id number]  Admitting Diagnosis: CVA (cerebral vascular accident)    Recommendations:                 General Recommendations:  Dysphagia therapy, Speech language evaluation, and Cognitive-linguistic evaluation  Diet recommendations:  Soft & Bite Sized Diet - IDDSI Level 6, Thin liquids - IDDSI Level 0   Aspiration Precautions: 1 bite/sip at a time, Alternating bites/sips, Meds whole 1 at a time, Remain upright 30 minutes post meal, Small bites/sips, and Standard aspiration precautions   General Precautions: Standard, fall  Communication strategies:  none    Assessment:     Eva Bloom is a 58 y.o. female with an diagnosis of acute metabolic encephalopathy. Pt appeared confused throughout session. ST to follow up for diet safety/upgrade as able and informally assess speech, language, and cognition.     History:     Past Medical History:   Diagnosis Date    Anemia     Anticoagulant long-term use     Atrial fibrillation     Bronchitis 03/01/2017    Cancer 2016    kidney cancer    CHF (congestive heart failure), NYHA class II, chronic, systolic     CMV (cytomegalovirus) antibody positive     CVA (cerebral vascular accident) 1/3/2024    Encounter for blood transfusion     ESRD (end stage renal disease)     Essential hypertension 09/23/2015    H/O herpes simplex type 2 infection     Herpes simplex type 1 antibody positive     History of kidney cancer     s/p left nephrectomy 1/2016    Hyperparathyroidism, unspecified     Hyperuricemia without signs of inflammatory arthritis and tophaceous disease     Kidney stones     LGSIL (low grade squamous intraepithelial dysplasia)     Myocardiopathy 07/21/2017    Prediabetes     Proteinuria     Renal disorder     Thyroid nodule     Urate nephropathy        Past Surgical History:   Procedure Laterality Date    BREAST CYST EXCISION      COLONOSCOPY N/A 11/12/2015    COLONOSCOPY N/A  3/12/2021    Procedure: COLONOSCOPY;  Surgeon: Brendon Lanier MD;  Location: [facility];  Service: Endoscopy;  Laterality: N/A;  covid test 3/9, labs prior, prep instr mailed -ml    EXCISION OF ARTERIOVENOUS FISTULA Left 9/27/2023    Procedure: EXCISION, AV FISTULA;  Surgeon: FARIDEH Muñoz III, MD;  Location: [facility] OR [facility];  Service: Vascular;  Laterality: Left;  LUE AV graft excision    INSERTION OF BIVENTRICULAR IMPLANTABLE CARDIOVERTER-DEFIBRILLATOR (ICD)  04/2021    INSERTION OF TUNNELED CENTRAL VENOUS CATHETER (CVC) WITH SUBCUTANEOUS PORT N/A 1/25/2023    Procedure: INSERTION, DUAL LUMEN CATHETER WITH PORT, WITH IMAGING GUIDANCE;  Surgeon: FARIDEH Muñoz III, MD;  Location: [facility] OR [facility];  Service: Peripheral Vascular;  Laterality: N/A;  possinble permcath placment    NEPHRECTOMY-LAPAROSCOPIC Left 01/12/2016    PERCUTANEOUS TRANSLUMINAL ANGIOPLASTY OF ARTERIOVENOUS FISTULA Left 4/19/2023    Procedure: PTA, AV FISTULA;  Surgeon: FARIDEH Muñoz III, MD;  Location: [facility] CATH LAB;  Service: Peripheral Vascular;  Laterality: Left;    PERITONEAL CATHETER INSERTION      Permacath insertion  01/12/2017    PLACEMENT OF ARTERIOVENOUS GRAFT  12/28/2022    Procedure: INSERTION, GRAFT, ARTERIOVENOUS;  Surgeon: FARIDEH Muñoz III, MD;  Location: [facility] OR [facility];  Service: Peripheral Vascular;;    REMOVAL, GRAFT, ARTERIOVENOUS, UPPER EXTREMITY Left 1/25/2023    Procedure: REMOVAL, GRAFT, ARTERIOVENOUS, UPPER EXTREMITY;  Surgeon: FARIDEH Muñoz III, MD;  Location: [facility] OR [facility];  Service: Peripheral Vascular;  Laterality: Left;    REVISION OF ARTERIOVENOUS FISTULA Left 5/1/2023    Procedure: REVISION, AV FISTULA;  Surgeon: FARIDEH Muñoz III, MD;  Location: [facility] OR 2ND FLR;  Service: Peripheral Vascular;  Laterality: Left;  LUE AVG revision    REVISION OF PROCEDURE INVOLVING ARTERIOVENOUS GRAFT Left 12/28/2022    Procedure: REVISION, PROCEDURE INVOLVING ARTERIOVENOUS GRAFT;  Surgeon: FARIDEH Muñoz III, MD;   [facility];  Service: Peripheral Vascular;  Laterality: Left;    REVISION OF PROCEDURE INVOLVING ARTERIOVENOUS GRAFT Left 7/21/2023    Procedure: LEFT ARM ARTERIOVENOUS GRAFT EXCISION  AND LIGATION;  Surgeon: Obi Werner MD;  Location: [facility] OR;  Service: Vascular;  Laterality: Left;  Left AVG excision and revision with interposition    SALPINGOOPHORECTOMY Right 2016    KJB---DAVINCI    TONSILLECTOMY      TUBAL LIGATION         Social History: Patient lives with  at home    Prior Intubation HX:  n/a for current admission    Modified Barium Swallow: n/a for current admission    Chest X-Rays: No significant changes    Prior diet: Regular/Thin.      Subjective     Pt in bed awake and alert upon ST entry. Pt's family present during session. Pt with confusion throughout session.   Patient goals: "How long do I have to stay here?"     Pain/Comfort:  Pain Rating 1: 0/10    Respiratory Status: Room air    Objective:   Cognition/Communication: Pt with minimal spontaneous speech this date. Pt able to orient to self  and current place only. Pt unable orient to current month/year. Pt reported "do you know why I am here?" Pt appeared confused throughout session. Pt was able to follow some of ST commands with 80% accy. Pt with delayed initiation and processing throughout session. ST to further assess speech, language, and cognition as able.    Oral Musculature Evaluation  Oral Musculature: general weakness  Dentition: scattered dentition  Secretion Management: adequate  Mucosal Quality: good  Mandibular Strength and Mobility: WFL  Oral Labial Strength and Mobility: functional seal, functional pursing, functional retraction, functional coordination  Lingual Strength and Mobility: functional strength  Volitional Cough:  (weak cough)  Volitional Swallow:  (adequate laryngeal elevation/excursion, per hand palpation)  Voice Prior to PO Intake:  (clear vocal quality, however, weak breath support so " pt with low volume)    Bedside Swallow Eval:   Consistencies Assessed:  Thin liquids straw sips x5+  Puree bites of pudding x3  Soft solids bites of sherri cracker dipped in water x3      Oral Phase:   WFL    Pharyngeal Phase:   no overt clinical signs/symptoms of aspiration  no overt clinical signs/symptoms of pharyngeal dysphagia    Compensatory Strategies  None    Treatment: Pt is safe for Soft & Bite Sized diet (IDDSI level 6). ST deferred further hard solid po trials 2/2 pt's confusion throughout session. ST to follow up for diet upgrade/safety as able. ST to informally assess pt's speech, language, and cognition.     Goals:   Multidisciplinary Problems       SLP Goals          Problem: SLP    Goal Priority Disciplines Outcome   SLP Goal     SLP Ongoing, Progressing   Description: Short Term Goals:  1. Pt will participate in a clinical swallow eval to determine least restrictive diet. -ongoing  2. Pt will safely tolerate >75% of Soft and bite sized with no overt s/s of aspiration. -ongoing  3. Pt will participate in informal speech-language and cognitive eval to r/o related deficits.                         Plan:     Patient to be seen:  3 x/week, 4 x/week   Plan of Care expires:  02/02/24  Plan of Care reviewed with:  patient, family   SLP Follow-Up:  Yes       Discharge recommendations:  Moderate Intensity Therapy   Barriers to Discharge:  None    Time Tracking:     SLP Treatment Date:   01/03/24  Speech Start Time:  1001  Speech Stop Time:  1020     Speech Total Time (min):  19 min    Billable Minutes: Eval Swallow and Oral Function 19 01/03/2024

## 2024-01-03 NOTE — ASSESSMENT & PLAN NOTE
- chronic history noted; on eliquis (was discontinued during hospice care)   - with AMS/restlessness increased risk of falls is concern with eliquis; sitter at bedside currently and family plans to be present at bedside as well   - also potential contributing factor with CVA,

## 2024-01-03 NOTE — PLAN OF CARE
Problem: SLP  Goal: SLP Goal  Description: Short Term Goals:  1. Pt will participate in a clinical swallow eval to determine least restrictive diet. -ongoing  2. Pt will safely tolerate >75% of Soft and bite sized with no overt s/s of aspiration. -ongoing  3. Pt will participate in informal speech-language and cognitive eval to r/o related deficits.    Outcome: Ongoing, Progressing     Pt participated in clinical BSE. Pt is safe for IDDSI level 6 soft and bite sized with thin liquids. ST to follow up for diet safety/upgrade as able.

## 2024-01-03 NOTE — ASSESSMENT & PLAN NOTE
- history of AMS in prior admissions, but typically improved to baseline   -  reports significant decline/changes in the past few days, markedly prior to admit   - pt continues to be disoriented, does not answer questions appropriately, somewhat restless in bed (moving all 4 extremities; does make eye contact when attempting to speak with her  - potential CVA; workup ongoing see CVA   - pt currently does not have capacity for decision making   - continued management per hospital primary

## 2024-01-03 NOTE — PT/OT/SLP PROGRESS
Physical Therapy      Patient Name:  Eva Bloom   MRN:  [id number]    Patient not seen today secondary to Other (Comment), Therapist assessment (Pt weak,has diarrhea and confused). Will follow-up tomorrow.

## 2024-01-03 NOTE — PROGRESS NOTES
Pt known to me with ersd on chronic hd TTS hx of ca of kidneys  Hx of chf a fib proteinuria was on hospice but doing better and pt recinded  Here at [facility] with acute AMS    Renal consult requested to help with care    Pt somewhat altered so HPI given by family sx present x a few days this week   Past Medical History:   Diagnosis Date    Anemia     Anticoagulant long-term use     Atrial fibrillation     Bronchitis 03/01/2017    Cancer 2016    kidney cancer    CHF (congestive heart failure), NYHA class II, chronic, systolic     CMV (cytomegalovirus) antibody positive     CVA (cerebral vascular accident) 1/3/2024    Encounter for blood transfusion     ESRD (end stage renal disease)     Essential hypertension 09/23/2015    H/O herpes simplex type 2 infection     Herpes simplex type 1 antibody positive     History of kidney cancer     s/p left nephrectomy 1/2016    Hyperparathyroidism, unspecified     Hyperuricemia without signs of inflammatory arthritis and tophaceous disease     Kidney stones     LGSIL (low grade squamous intraepithelial dysplasia)     Myocardiopathy 07/21/2017    Prediabetes     Proteinuria     Renal disorder     Thyroid nodule     Urate nephropathy      Past Surgical History:   Procedure Laterality Date    BREAST CYST EXCISION      COLONOSCOPY N/A 11/12/2015    COLONOSCOPY N/A 3/12/2021    Procedure: COLONOSCOPY;  Surgeon: Brendon Lanier MD;  Location: [facility];  Service: Endoscopy;  Laterality: N/A;  covid test 3/9, labs prior, prep instr mailed -ml    EXCISION OF ARTERIOVENOUS FISTULA Left 9/27/2023    Procedure: EXCISION, AV FISTULA;  Surgeon: FARIDEH Muñoz III, MD;  Location: [facility] OR [address] [place] [address];  Service: Vascular;  Laterality: Left;  LUE AV graft excision    INSERTION OF BIVENTRICULAR IMPLANTABLE CARDIOVERTER-DEFIBRILLATOR (ICD)  04/2021    INSERTION OF TUNNELED CENTRAL VENOUS CATHETER (CVC) WITH SUBCUTANEOUS PORT N/A 1/25/2023    Procedure: INSERTION, DUAL LUMEN CATHETER WITH PORT, WITH  IMAGING GUIDANCE;  Surgeon: FARIDEH Muñoz III, MD;  Location: [facility] OR [facility];  Service: Peripheral Vascular;  Laterality: N/A;  possinble permcath placment    NEPHRECTOMY-LAPAROSCOPIC Left 01/12/2016    PERCUTANEOUS TRANSLUMINAL ANGIOPLASTY OF ARTERIOVENOUS FISTULA Left 4/19/2023    Procedure: PTA, AV FISTULA;  Surgeon: FARIDEH Muñoz III, MD;  Location: [facility] CATH LAB;  Service: Peripheral Vascular;  Laterality: Left;    PERITONEAL CATHETER INSERTION      Permacath insertion  01/12/2017    PLACEMENT OF ARTERIOVENOUS GRAFT  12/28/2022    Procedure: INSERTION, GRAFT, ARTERIOVENOUS;  Surgeon: FARIDEH Muñoz III, MD;  [facility];  Service: Peripheral Vascular;;    REMOVAL, GRAFT, ARTERIOVENOUS, UPPER EXTREMITY Left 1/25/2023    Procedure: REMOVAL, GRAFT, ARTERIOVENOUS, UPPER EXTREMITY;  Surgeon: FARIDEH Muñoz III, MD;  [facility];  Service: Peripheral Vascular;  Laterality: Left;    REVISION OF ARTERIOVENOUS FISTULA Left 5/1/2023    Procedure: REVISION, AV FISTULA;  Surgeon: FARIDEH Muñoz III, MD;  [facility];  Service: Peripheral Vascular;  Laterality: Left;  LUE AVG revision    REVISION OF PROCEDURE INVOLVING ARTERIOVENOUS GRAFT Left 12/28/2022    Procedure: REVISION, PROCEDURE INVOLVING ARTERIOVENOUS GRAFT;  Surgeon: FARIDEH Muñoz III, MD;  [facility];  Service: Peripheral Vascular;  Laterality: Left;    REVISION OF PROCEDURE INVOLVING ARTERIOVENOUS GRAFT Left 7/21/2023    Procedure: LEFT ARM ARTERIOVENOUS GRAFT EXCISION  AND LIGATION;  Surgeon: Obi Werner MD;  Location: [facility];  Service: Vascular;  Laterality: Left;  Left AVG excision and revision with interposition    SALPINGOOPHORECTOMY Right 2016    KJB---DAVINCI    TONSILLECTOMY      TUBAL LIGATION       Social History     Socioeconomic History    Marital status:     Number of children: 2   Occupational History    Occupation: lucero     Employer: [id number]    Tobacco Use    Smoking status: Never     Passive exposure: Never    Smokeless tobacco: Never   Substance and Sexual Activity    Alcohol use: No    Drug use: Yes     Types: Hydrocodone    Sexual activity: Not Currently     Social Determinants of Health     Financial Resource Strain: Low Risk  (9/28/2023)    Overall Financial Resource Strain (CARDIA)     Difficulty of Paying Living Expenses: Not hard at all   Food Insecurity: No Food Insecurity (9/28/2023)    Hunger Vital Sign     Worried About Running Out of Food in the Last Year: Never true     Ran Out of Food in the Last Year: Never true   Transportation Needs: No Transportation Needs (9/28/2023)    PRAPARE - Transportation     Lack of Transportation (Medical): No     Lack of Transportation (Non-Medical): No   Physical Activity: Inactive (9/28/2023)    Exercise Vital Sign     Days of Exercise per Week: 0 days     Minutes of Exercise per Session: 0 min   Stress: No Stress Concern Present (9/28/2023)    [employer] [place] [employer] - Occupational Stress Questionnaire     Feeling of Stress : Not at all   Social Connections: Moderately Integrated (9/28/2023)    Social Connection and Isolation Panel [NHANES]     Frequency of Communication with Friends and Family: More than three times a week     Frequency of Social Gatherings with Friends and Family: More than three times a week     Attends Restorationism Services: More than 4 times per year     Active Member of Clubs or Organizations: No     Attends Club or Organization Meetings: Never     Marital Status:    Housing Stability: Low Risk  (9/28/2023)    Housing Stability Vital Sign     Unable to Pay for Housing in the Last Year: No     Number of Places Lived in the Last Year: 1     Unstable Housing in the Last Year: No     Family History   Problem Relation Age of Onset    Hypertension Mother     Diabetes Father     Kidney disease Father     No Known Problems Sister     Heart disease Sister     No Known  Problems Sister     No Known Problems Brother     No Known Problems Brother     Cataracts Maternal Aunt     No Known Problems Maternal Uncle     No Known Problems Paternal Aunt     No Known Problems Paternal Uncle     No Known Problems Maternal Grandmother     Diabetes Maternal Grandfather     No Known Problems Paternal Grandmother     No Known Problems Paternal Grandfather     No Known Problems Son     No Known Problems Son     No Known Problems Other     Breast cancer Neg Hx     Colon cancer Neg Hx     Cancer Neg Hx     Stroke Neg Hx     Amblyopia Neg Hx     Blindness Neg Hx     Glaucoma Neg Hx     Macular degeneration Neg Hx     Retinal detachment Neg Hx     Strabismus Neg Hx     Thyroid disease Neg Hx        Review of patient's allergies indicates:   Allergen Reactions    Coreg [carvedilol] Other (See Comments)     Nausea/vomiting    Allopurinol      Other reaction(s): abnormal transaminases       Current Facility-Administered Medications   Medication    acetaminophen tablet 650 mg    albuterol-ipratropium 2.5 mg-0.5 mg/3 mL nebulizer solution 3 mL    apixaban tablet 2.5 mg    aspirin EC tablet 81 mg    atorvastatin tablet 40 mg    famotidine tablet 20 mg    levothyroxine tablet 75 mcg    loperamide capsule 2 mg    mupirocin 2 % ointment     Facility-Administered Medications Ordered in Other Encounters   Medication    0.9%  NaCl infusion       LABS    Recent Results (from the past 24 hour(s))   CBC auto differential    Collection Time: 01/03/24  9:17 AM   Result Value Ref Range    WBC 5.76 3.90 - 12.70 K/uL    RBC 5.27 4.00 - 5.40 M/uL    Hemoglobin 13.3 12.0 - 16.0 g/dL    Hematocrit 47.0 37.0 - 48.5 %    MCV 89 82 - 98 fL    MCH 25.2 (L) 27.0 - 31.0 pg    MCHC 28.3 (L) 32.0 - 36.0 g/dL    RDW 19.6 (H) 11.5 - 14.5 %    Platelets 256 150 - 450 K/uL    MPV 9.2 9.2 - 12.9 fL    Immature Granulocytes 0.3 0.0 - 0.5 %    Gran # (ANC) 3.7 1.8 - 7.7 K/uL    Immature Grans (Abs) 0.02 0.00 - 0.04 K/uL    Lymph # 0.7 (L)  "1.0 - 4.8 K/uL    Mono # 0.9 0.3 - 1.0 K/uL    Eos # 0.4 0.0 - 0.5 K/uL    Baso # 0.11 0.00 - 0.20 K/uL    nRBC 0 0 /100 WBC    Gran % 63.8 38.0 - 73.0 %    Lymph % 12.7 (L) 18.0 - 48.0 %    Mono % 14.9 4.0 - 15.0 %    Eosinophil % 6.4 0.0 - 8.0 %    Basophil % 1.9 0.0 - 1.9 %    Differential Method Automated    Basic metabolic panel    Collection Time: 01/03/24  9:17 AM   Result Value Ref Range    Sodium 137 136 - 145 mmol/L    Potassium 4.4 3.5 - 5.1 mmol/L    Chloride 101 95 - 110 mmol/L    CO2 23 23 - 29 mmol/L    Glucose 73 70 - 110 mg/dL    BUN 39 (H) 6 - 20 mg/dL    Creatinine 5.3 (H) 0.5 - 1.4 mg/dL    Calcium 10.9 (H) 8.7 - 10.5 mg/dL    Anion Gap 13 8 - 16 mmol/L    eGFR 9 (A) >60 mL/min/1.73 m^2   ]    I/O last 3 completed shifts:  In: 751.8 [P.O.:120; IV Piggyback:631.8]  Out: 1 [Stool:1]    Vitals:    01/03/24 0914 01/03/24 1105 01/03/24 1120 01/03/24 1616   BP:   117/82 117/83   Pulse: 110  104 103   Resp: 18  18 19   Temp:   97.6 °F (36.4 °C) 97.6 °F (36.4 °C)   TempSrc:       SpO2: 97%  99% 97%   Weight:  42.6 kg (94 lb)     Height:  5' 4" (1.626 m)         No Jvd, Thyromegaly or Lymphadenopathy  Lungs: Fairly clear anteriorly and laterally  Cor: RRR no G or rubs  Abd: Soft benign good bowel sounds non tender  Ext: No E C C    A)  ESRD  Htn  Anemia   met renal ca   AMS   ? New CVA vs mets was on hospice   2nd hyperpth       P)    Renal Diet  Home meds  Protect access  HD  EPO   Binders  Adjust all meds to the degree of renal fx  Close follow up I/O and weights  Maintain Hydration   HD   "

## 2024-01-03 NOTE — ASSESSMENT & PLAN NOTE
Not sure etiology at this time.  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

## 2024-01-03 NOTE — ASSESSMENT & PLAN NOTE
- chronic history noted   - sees Dr. Rivera ([facility] oncology) as OP with recent 11/23 follow up post discharge last admission; no longer candidate for any type of onc treatment;  with good insight into this although he shares difficulty accepting the information   - likely contributing factor to CVA/future CVA risk which was dicussed with  (see CVA)   - also contributes to appropriateness for hospice, but does not align with GOC   - continued management per hospital primary

## 2024-01-03 NOTE — HPI
"From H&P: "57 yo F with a PMHx of CHF with EF of 23%, ESRD on dialysis (TTS), Afib on OAC, AICD placement (currently disconnected per ), RCC with metastatic disease to lung and pancreas, presenting to the ED BIB  for altered mental status. History is provided by independent historian, pt's , reports for the last few days, she has been confused, intermittently falling asleep, experiencing auditory hallucinations, and talking to herself, and experiencing dark stools.  took her to dialysis this morning, was able to receive full session but brought her straight here afterwards for further evaluation. She currently has a port a cath to her chest wall for dialysis, and is anuric, no recent missed sessions. Her nephrologist is Dr Bender.  reports she ambulates w/ a walker, is able to hold a full conversation and answer questions at baseline. She has been able to recognize him the last few days.  also reports she received a dose of the COVID vaccine around 4 days ago.   In ER patient is febrile 100.8,chest X ray with no sign of pneumonia,Covid and flu are negative,patient received broad spectrum IV Abx,not able get urine,not urinating,blood culture is done,her HH is stable,head CT suspect for CVA,neurology is consulted and patient will have MRI brain and EEG."     Palliative medicine consulted for goals of care discussion and advance care planning; for details of visit, see advance care planning section of plan.   "

## 2024-01-03 NOTE — CONSULTS
[facility] S[facility]  Palliative Medicine  Consult Note    Patient Name: Eva Bloom  MRN: [id number]  Admission Date: 1/2/2024  Hospital Length of Stay: 1 days  Code Status: Full Code   Attending Provider: José Miguel Cristina, Samara  Consulting Provider: Leila Munoz NP  Primary Care Physician: Sowmya Mccain MD  Principal Problem:CVA (cerebral vascular accident)    Patient information was obtained from spouse/SO, past medical records, ER records, and primary team.      Inpatient consult to Palliative Care  Consult performed by: Leila Munoz NP  Consult ordered by: José Miguel Cristina MD  Reason for consult: continuity of care, goals of care, and advanced care planning        Assessment/Plan:   Advance Care Planning     Palliative Care  ACP (advance care planning)  1/3/2024 - Consult   - consult received; interval chart reviewed in detail; discussed pt with hospital primary, Dr. Ramires   - pt known to myself and palliative medicine team from previous admission; pt previously on hospice with [facility] and revoked 12/15 for wishes of aggressive treatment in regards to HD   - met with patient and  Otis at bedside; introduction to palliative medicine team and role in current care and admission; pt not able to participate in discussions due to AMS   - learned more about pt and ongoing care since prior admission;  reports pt has been doing overall well/better since prior admission until recently; continues to tolerate HD with no issues; but started experiencing decline/AMS prior to admission which prompted ED visit   -  explained hospice d/c due to wishing to continue with HD as pt is tolerating well; he stated that if pt were to ever not tolerate HD he would be more agreeable to stopping HD, but wishes for her to continue it at this time;  he has good insight that death would be imminent if HD were to be stopped   - GOC/ACP discussion; focus of discussion on current full  "code status, as pt was previously DNR last 2 admissions and completed DNR LAPOST with hospice  - in depth discussion about pt's current health status and end stage nature of multiple of pt's conditions ; explained difference between DNR and do not treat as this was a prior concern for pt,  and sister in law Karis in the past; strong recommendation for DNR as pt would not tolerate CPR/intubation well, nor would it solve any of pt's multiple end stage conditions;  seems to have good insight into this (like he did in prior admission when pt was DNR),  even grimacing and moving hands to face when discussing potential for CPR, stating "it would crush her"; I supported these thoughts and feelings  - Otis asked that I call his sister/pt's sister in law, Karis, to discuss with her as well as he was not comfortable changing code status without her input as she is an RN; I recall Karis advocating for full code in prior admission as well; I attempted to call Karis following visit, she requested to call my back at a later time as she was working on a report   - plan for continue discussion of recommendation for DNR and encouragement that pt can still receive full treatment with DNR code status   - hospital  consulted and following pt/family   - emotional support provided   - Allowed time for questions/concerns; all addressed; expressed availability of myself/palliative team for additional questions/concerns     Neuro  * CVA (cerebral vascular accident)  - pt presented with significant AMS   - CTA head: : Focal calcification along the expected left proximal M2 segment of the MCA concerning for nonocclusive calcified embolus   - neurology consulted/following; EEG pending   - pt moved all extremities during my assessment, but remains with  -  with good insight into probability of stroke; discussed correlation of metastatic cancer, a fib, and strokes; correlation between metastatic/end " stage cancer and clots/stroke did seem some what new info for   - plan for continued discussion following CVA work up and input from neurology   - continued management per hospital primary and neurology     Acute metabolic encephalopathy  - history of AMS in prior admissions, but typically improved to baseline   -  reports significant decline/changes in the past few days, markedly prior to admit   - pt continues to be disoriented, does not answer questions appropriately, somewhat restless in bed (moving all 4 extremities; does make eye contact when attempting to speak with her  - potential CVA; workup ongoing see CVA   - pt currently does not have capacity for decision making   - continued management per hospital primary      Cardiac/Vascular  PAF (paroxysmal atrial fibrillation)  - chronic history noted; on eliquis (was discontinued during hospice care)   - with AMS/restlessness increased risk of falls is concern with eliquis; sitter at bedside currently and family plans to be present at bedside as well   - also potential contributing factor with CVA,    Chronic combined systolic and diastolic congestive heart failure  - chronic condition noted  - pt with AICD in place   - 5/23 is most recent echo on file with EF 23% at that time and Grade II left ventricular diastolic dysfunction  - contributes to overall poor condition/prognosis and contributes to pt's appropriateness for hospice but does not align with GOC at this time   - continued management per hospital primary     Renal/  ESRD (end stage renal disease) on dialysis  - on HD   - revoked hospice 12/15 with wishes to continue HD  -  wishes to continued HD as long as pt continues to tolerate it well   - continued management per primary     S/p nephrectomy left  - chronic history noted  - current HD patient     Oncology  Metastatic renal cell carcinoma to lung, unspecified laterality  - chronic history noted   - sees Dr. Rivera ([facility]) as OP with recent 11/23 follow up post discharge last admission; no longer candidate for any type of onc treatment;  with good insight into this although he shares difficulty accepting the information   - likely contributing factor to CVA/future CVA risk which was dicussed with  (see CVA)   - also contributes to appropriateness for hospice, but does not align with GOC   - continued management per hospital primary     History of kidney cancer  - see nephrectomy , ESRD, and metastatic cancer     Endocrine  Severe protein-calorie malnutrition  - pt with ongoing severe protein calorie malnutrition  - weight: 42.6kg/94lb and BMI 16.14  -  reports some PO intake improvements since prior discharge with decrease with AMS   - contributes to appropriateness for hospice, but does not align with current GOC   - if GOC continue to be for full treatment consider restarting mirtazapine and nutrition consult when safe for PO intake   - continued  management per hospital primary     Thank you for your consult. I will follow-up with patient. Please contact us if you have any additional questions.    Total visit time: 86 minutes    70 min visit time including: face to face time in discussion of symptom assessment, and exploring options and burdens of offered treatments.  This also includes non-face to face time preparing to see the patient including chart review, obtaining and/or reviewing separately obtained history, documenting clinical information in the electronic or other health record, independently interpreting results and communicating results to the patient/family/caregiver, family discussions by phone if not able to be present, coordination of care with other specialists, and discharge planning.     16 min ACP time spent: goals of care, advanced care planning, emotional support, formulating and communicating prognosis, exploring burden/ benefit of various approaches of treatment.     Leila  "C. Alexander, NP  Palliative Medicine  [facility]      Subjective:     HPI:   From H&P: "59 yo F with a PMHx of CHF with EF of 23%, ESRD on dialysis (TTS), Afib on OAC, AICD placement (currently disconnected per ), RCC with metastatic disease to lung and pancreas, presenting to the ED BIB  for altered mental status. History is provided by independent historian, pt's , reports for the last few days, she has been confused, intermittently falling asleep, experiencing auditory hallucinations, and talking to herself, and experiencing dark stools.  took her to dialysis this morning, was able to receive full session but brought her straight here afterwards for further evaluation. She currently has a port a cath to her chest wall for dialysis, and is anuric, no recent missed sessions. Her nephrologist is Dr Bender.  reports she ambulates w/ a walker, is able to hold a full conversation and answer questions at baseline. She has been able to recognize him the last few days.  also reports she received a dose of the COVID vaccine around 4 days ago.   In ER patient is febrile 100.8,chest X ray with no sign of pneumonia,Covid and flu are negative,patient received broad spectrum IV Abx,not able get urine,not urinating,blood culture is done,her HH is stable,head CT suspect for CVA,neurology is consulted and patient will have MRI brain and EEG."     Palliative medicine consulted for goals of care discussion and advance care planning; for details of visit, see advance care planning section of plan.     Hospital Course:  No notes on file    Past Medical History:   Diagnosis Date    Anemia     Anticoagulant long-term use     Atrial fibrillation     Bronchitis 03/01/2017    Cancer 2016    kidney cancer    CHF (congestive heart failure), NYHA class II, chronic, systolic     CMV (cytomegalovirus) antibody positive     CVA (cerebral vascular accident) 1/3/2024    Encounter " for blood transfusion     ESRD (end stage renal disease)     Essential hypertension 09/23/2015    H/O herpes simplex type 2 infection     Herpes simplex type 1 antibody positive     History of kidney cancer     s/p left nephrectomy 1/2016    Hyperparathyroidism, unspecified     Hyperuricemia without signs of inflammatory arthritis and tophaceous disease     Kidney stones     LGSIL (low grade squamous intraepithelial dysplasia)     Myocardiopathy 07/21/2017    Prediabetes     Proteinuria     Renal disorder     Thyroid nodule     Urate nephropathy      Past Surgical History:   Procedure Laterality Date    BREAST CYST EXCISION      COLONOSCOPY N/A 11/12/2015    COLONOSCOPY N/A 3/12/2021    Procedure: COLONOSCOPY;  Surgeon: Brendon Lanier MD;  Location: [facility] ENDO;  Service: Endoscopy;  Laterality: N/A;  covid test 3/9, labs prior, prep instr mailed -ml    EXCISION OF ARTERIOVENOUS FISTULA Left 9/27/2023    Procedure: EXCISION, AV FISTULA;  Surgeon: FARIDEH Muñoz III, MD;  Location: [facility] OR [address] [place] [address];  Service: Vascular;  Laterality: Left;  LUE AV graft excision    INSERTION OF BIVENTRICULAR IMPLANTABLE CARDIOVERTER-DEFIBRILLATOR (ICD)  04/2021    INSERTION OF TUNNELED CENTRAL VENOUS CATHETER (CVC) WITH SUBCUTANEOUS PORT N/A 1/25/2023    Procedure: INSERTION, DUAL LUMEN CATHETER WITH PORT, WITH IMAGING GUIDANCE;  Surgeon: FARIDEH Muñoz III, MD;  [facility];  Service: Peripheral Vascular;  Laterality: N/A;  possinble permcath placment    NEPHRECTOMY-LAPAROSCOPIC Left 01/12/2016    PERCUTANEOUS TRANSLUMINAL ANGIOPLASTY OF ARTERIOVENOUS FISTULA Left 4/19/2023    Procedure: PTA, AV FISTULA;  Surgeon: FARIDEH Muñoz III, MD;  Location: [facility] CATH LAB;  Service: Peripheral Vascular;  Laterality: Left;    PERITONEAL CATHETER INSERTION      Permacath insertion  01/12/2017    PLACEMENT OF ARTERIOVENOUS GRAFT  12/28/2022    Procedure: INSERTION, GRAFT, ARTERIOVENOUS;  Surgeon: FARIDEH Muñoz III, MD;   Location: [facility] OR 2ND FLR;  Service: Peripheral Vascular;;    REMOVAL, GRAFT, ARTERIOVENOUS, UPPER EXTREMITY Left 1/25/2023    Procedure: REMOVAL, GRAFT, ARTERIOVENOUS, UPPER EXTREMITY;  Surgeon: FARIDEH Muñoz III, MD;  Location: [facility] OR 2ND FLR;  Service: Peripheral Vascular;  Laterality: Left;    REVISION OF ARTERIOVENOUS FISTULA Left 5/1/2023    Procedure: REVISION, AV FISTULA;  Surgeon: FARIDEH Muñoz III, MD;  Location: [facility] OR 2ND FLR;  Service: Peripheral Vascular;  Laterality: Left;  LUE AVG revision    REVISION OF PROCEDURE INVOLVING ARTERIOVENOUS GRAFT Left 12/28/2022    Procedure: REVISION, PROCEDURE INVOLVING ARTERIOVENOUS GRAFT;  Surgeon: FARIDEH Muñoz III, MD;  Location: [facility] OR 2ND FLR;  Service: Peripheral Vascular;  Laterality: Left;    REVISION OF PROCEDURE INVOLVING ARTERIOVENOUS GRAFT Left 7/21/2023    Procedure: LEFT ARM ARTERIOVENOUS GRAFT EXCISION  AND LIGATION;  Surgeon: Obi Werner MD;  Location: [facility] OR;  Service: Vascular;  Laterality: Left;  Left AVG excision and revision with interposition    SALPINGOOPHORECTOMY Right 2016    KJB---DAVINCI    TONSILLECTOMY      TUBAL LIGATION       Review of patient's allergies indicates:   Allergen Reactions    Coreg [carvedilol] Other (See Comments)     Nausea/vomiting    Allopurinol      Other reaction(s): abnormal transaminases     Medications:  Continuous Infusions:  Scheduled Meds:   apixaban  2.5 mg Oral BID    aspirin  81 mg Oral Daily    atorvastatin  40 mg Oral Daily    famotidine  20 mg Oral Daily    levothyroxine  75 mcg Oral Before breakfast    mupirocin   Nasal BID     PRN Meds:acetaminophen, albuterol-ipratropium    Family History       Problem Relation (Age of Onset)    Cataracts Maternal Aunt    Diabetes Father, Maternal Grandfather    Heart disease Sister    Hypertension Mother    Kidney disease Father    No Known Problems Sister, Sister, Brother, Brother, Maternal Uncle, Paternal Aunt, Paternal Uncle, Maternal  Grandmother, Paternal Grandmother, Paternal Grandfather, Son, Son, Other          Tobacco Use    Smoking status: Never     Passive exposure: Never    Smokeless tobacco: Never   Substance and Sexual Activity    Alcohol use: No    Drug use: Yes     Types: Hydrocodone    Sexual activity: Not Currently     Review of Systems   Constitutional:  Positive for activity change and fatigue. Negative for appetite change (consistently low appetite).   Neurological:  Positive for weakness.   Psychiatric/Behavioral:  Positive for confusion.      Objective:     Vital Signs (Most Recent):  Temp: 97.6 °F (36.4 °C) (01/03/24 1120)  Pulse: 104 (01/03/24 1120)  Resp: 18 (01/03/24 1120)  BP: 117/82 (01/03/24 1120)  SpO2: 99 % (01/03/24 1120) Vital Signs (24h Range):  Temp:  [97.1 °F (36.2 °C)-99.6 °F (37.6 °C)] 97.6 °F (36.4 °C)  Pulse:  [] 104  Resp:  [17-29] 18  SpO2:  [92 %-99 %] 99 %  BP: (100-127)/(68-89) 117/82     Weight: 42.6 kg (94 lb)  Body mass index is 16.14 kg/m².     Physical Exam  Vitals and nursing note reviewed.   Constitutional:       General: She is awake.      Appearance: She is cachectic.   HENT:      Head: Atraumatic.      Comments: Ranjan temporal wasting      Mouth/Throat:      Mouth: Mucous membranes are dry.   Pulmonary:      Effort: Pulmonary effort is normal. No respiratory distress.   Musculoskeletal:      Comments: Muscular wasting to all extremities    Skin:     Comments: Clean intact dressings to bilateral heels    Neurological:      Mental Status: She is alert. She is disoriented.   Psychiatric:         Behavior: Behavior is uncooperative.        Advance Care Planning  Advance Directives:   Living Will: No    [place]: Yes (prior DNR LAPOST signed with Hospice)    Do Not Resuscitate Status: No (previously DNR with current wishes for full code; currently discussing with family with some percieved confusion with DNR vs do not treat;  with good insight that CPR would be harmful to pt at this time and  in current state)    Medical Power of : No    Goals of Care: What is most important right now is to focus on spending time at home, avoiding the hospital, remaining as independent as possible, quality of life, even if it means sacrificing a little time, improvement in condition but with limits to invasive therapies, comfort and QOL . Accordingly, we have decided that the best plan to meet the patient's goals includes continuing with treatment.     Significant Labs: All pertinent labs within the past 24 hours have been reviewed.    CBC:   Recent Labs   Lab 01/03/24  0917   WBC 5.76   HGB 13.3   HCT 47.0   MCV 89        BMP:  Recent Labs   Lab 01/03/24  0917   GLU 73      K 4.4      CO2 23   BUN 39*   CREATININE 5.3*   CALCIUM 10.9*     LFT:  Lab Results   Component Value Date    AST 63 (H) 01/02/2024     (H) 10/12/2006    ALKPHOS 606 (H) 01/02/2024    BILITOT 1.9 (H) 01/02/2024     Albumin:   Albumin   Date Value Ref Range Status   01/02/2024 3.1 (L) 3.5 - 5.2 g/dL Final     Protein:   Total Protein   Date Value Ref Range Status   01/02/2024 9.5 (H) 6.0 - 8.4 g/dL Final     Lactic acid:   Lab Results   Component Value Date    LACTATE 2.9 (H) 01/02/2024    LACTATE 1.6 10/16/2023     Significant Imaging: I have reviewed all pertinent imaging results/findings within the past 24 hours.      I spent a total of 86 minutes on the day of the visit. This includes face to face time in discussion of goals of care, symptom assessment, coordination of care and emotional support.  This also includes non-face to face time preparing to see the patient (eg, review of tests/imaging), obtaining and/or reviewing separately obtained history, documenting clinical information in the electronic or other health record, independently interpreting results and communicating results to the patient/family/caregiver, or care coordinator.    Leila Munoz NP  Palliative Medicine  [facility]  Surg

## 2024-01-03 NOTE — PT/OT/SLP PROGRESS
Occupational Therapy      Patient Name:  vEa Bloom   MRN:  [id number]    Patient not seen today secondary to  too lethargic and with decreased cognition to conduct evaluation Will follow-up tomorrow.    1/3/2024

## 2024-01-03 NOTE — PROGRESS NOTES
[facility] Medicine  Progress Note    Patient Name: Eva Bloom  MRN: [id number]  Patient Class: IP- Inpatient   Admission Date: 1/2/2024  Length of Stay: 1 days  Attending Physician: José Miguel Cristina, *  Primary Care Provider: Sowmya Mccain MD        Subjective:     Principal Problem:CVA (cerebral vascular accident)        HPI:  59 yo F with a PMHx of CHF with EF of 23%, ESRD on dialysis (TTS), Afib on OAC, AICD placement (currently disconnected per ), RCC with metastatic disease to lung and pancreas, presenting to the ED BIB  for altered mental status. History is provided by independent historian, pt's , reports for the last few days, she has been confused, intermittently falling asleep, experiencing auditory hallucinations, and talking to herself, and experiencing dark stools.  took her to dialysis this morning, was able to receive full session but brought her straight here afterwards for further evaluation. She currently has a port a cath to her chest wall for dialysis, and is anuric, no recent missed sessions. Her nephrologist is Dr Bender.  reports she ambulates w/ a walker, is able to hold a full conversation and answer questions at baseline. She has been able to recognize him the last few days.  also reports she received a dose of the COVID vaccine around 4 days ago.   In ER patient is febrile 100.8,chest X ray with no sign of pneumonia,Covid and flu are negative,patient received broad spectrum IV Abx,not able get urine,not urinating,blood culture is done,her HH is stable,head CT suspect for CVA,neurology is consulted and patient will have MRI brain and EEG.    Overview/Hospital Course:  59 yo F with a PMHx of CHF with EF of 23%, ESRD on dialysis (TTS), Afib on OAC, AICD placement (currently disconnected per ), RCC with metastatic disease to lung and pancreas, presenting to the ED BIB  for altered mental status. She  currently has a port a cath to her chest wall for dialysis, and is anuric, no recent missed sessions.  also reports she received a dose of the COVID vaccine around 4 days ago.   In ER patient is febrile 100.8,chest X ray with no sign of pneumonia,Covid and flu are negative,patient received broad spectrum IV Abx,not able get urine,not urinating,blood culture is done,her HH is stable,head CT suspect for CVA,neurology is consulted ,can nt have MRI duo to AICD,  CTA more consistent with CVA,on ASA,statin and  PT,OT,ST is consulted.    Past Medical History:   Diagnosis Date    Anemia     Anticoagulant long-term use     Atrial fibrillation     Bronchitis 03/01/2017    Cancer 2016    kidney cancer    CHF (congestive heart failure), NYHA class II, chronic, systolic     CMV (cytomegalovirus) antibody positive     Encounter for blood transfusion     ESRD (end stage renal disease)     Essential hypertension 09/23/2015    H/O herpes simplex type 2 infection     Herpes simplex type 1 antibody positive     History of kidney cancer     s/p left nephrectomy 1/2016    Hyperparathyroidism, unspecified     Hyperuricemia without signs of inflammatory arthritis and tophaceous disease     Kidney stones     LGSIL (low grade squamous intraepithelial dysplasia)     Myocardiopathy 07/21/2017    Prediabetes     Proteinuria     Renal disorder     Thyroid nodule     Urate nephropathy        Past Surgical History:   Procedure Laterality Date    BREAST CYST EXCISION      COLONOSCOPY N/A 11/12/2015    COLONOSCOPY N/A 3/12/2021    Procedure: COLONOSCOPY;  Surgeon: Brendon Lanier MD;  Location: [facility];  Service: Endoscopy;  Laterality: N/A;  covid test 3/9, labs prior, prep instr mailed -ml    EXCISION OF ARTERIOVENOUS FISTULA Left 9/27/2023    Procedure: EXCISION, AV FISTULA;  Surgeon: FARIDEH Muñoz III, MD;  Location: [facility] OR [address] [place] [address];  Service: Vascular;  Laterality: Left;  LUE AV graft excision    INSERTION OF BIVENTRICULAR  IMPLANTABLE CARDIOVERTER-DEFIBRILLATOR (ICD)  04/2021    INSERTION OF TUNNELED CENTRAL VENOUS CATHETER (CVC) WITH SUBCUTANEOUS PORT N/A 1/25/2023    Procedure: INSERTION, DUAL LUMEN CATHETER WITH PORT, WITH IMAGING GUIDANCE;  Surgeon: FARIDEH Muñoz III, MD;  Location: [facility] OR [address] [place] [address];  Service: Peripheral Vascular;  Laterality: N/A;  possinble permcath placment    NEPHRECTOMY-LAPAROSCOPIC Left 01/12/2016    PERCUTANEOUS TRANSLUMINAL ANGIOPLASTY OF ARTERIOVENOUS FISTULA Left 4/19/2023    Procedure: PTA, AV FISTULA;  Surgeon: FARIDEH Muñoz III, MD;  Location: [facility] CATH LAB;  Service: Peripheral Vascular;  Laterality: Left;    PERITONEAL CATHETER INSERTION      Permacath insertion  01/12/2017    PLACEMENT OF ARTERIOVENOUS GRAFT  12/28/2022    Procedure: INSERTION, GRAFT, ARTERIOVENOUS;  Surgeon: FARIDEH Muñoz III, MD;  [facility];  Service: Peripheral Vascular;;    REMOVAL, GRAFT, ARTERIOVENOUS, UPPER EXTREMITY Left 1/25/2023    Procedure: REMOVAL, GRAFT, ARTERIOVENOUS, UPPER EXTREMITY;  Surgeon: FARIDEH Muñoz III, MD;  Location[facility];  Service: Peripheral Vascular;  Laterality: Left;    REVISION OF ARTERIOVENOUS FISTULA Left 5/1/2023    Procedure: REVISION, AV FISTULA;  Surgeon: FARIDEH Muñoz III, MD;  Location: [facility];  Service: Peripheral Vascular;  Laterality: Left;  LUE AVG revision    REVISION OF PROCEDURE INVOLVING ARTERIOVENOUS GRAFT Left 12/28/2022    Procedure: REVISION, PROCEDURE INVOLVING ARTERIOVENOUS GRAFT;  Surgeon: FARIDEH Muñoz III, MD;  Location: [facility];  Service: Peripheral Vascular;  Laterality: Left;    REVISION OF PROCEDURE INVOLVING ARTERIOVENOUS GRAFT Left 7/21/2023    Procedure: LEFT ARM ARTERIOVENOUS GRAFT EXCISION  AND LIGATION;  Surgeon: Obi Werner MD;  Location: [facility] OR;  Service: Vascular;  Laterality: Left;  Left AVG excision and revision with interposition    SALPINGOOPHORECTOMY Right 2016    KJB---GENE     TONSILLECTOMY      TUBAL LIGATION         Review of patient's allergies indicates:   Allergen Reactions    Coreg [carvedilol] Other (See Comments)     Nausea/vomiting    Allopurinol      Other reaction(s): abnormal transaminases       Current Facility-Administered Medications on File Prior to Encounter   Medication    0.9%  NaCl infusion     Current Outpatient Medications on File Prior to Encounter   Medication Sig    acetaminophen (TYLENOL) 500 MG tablet Take 500 mg by mouth every 6 (six) hours as needed for Pain.    albuterol-ipratropium (DUO-NEB) 2.5 mg-0.5 mg/3 mL nebulizer solution Take by nebulization.    apixaban (ELIQUIS) 2.5 mg Tab Take 1 tablet (2.5 mg total) by mouth 2 (two) times daily.    bisacodyL (DULCOLAX) 10 mg Supp Place 10 mg rectally.    cabozantinib (CABOMETYX) 60 mg Tab TAKE ONE TABLET BY MOUTH ONCE DAILY AT THE SAME TIME ON AN EMPTY STOMACH AT LEAST 1 HOUR BEFORE OR 2 HOURS AFTER EATING. AVOID GRAPEFRUIT PRODUCTS    cloNIDine 0.3 mg/24 hr td ptwk (CATAPRES) 0.3 mg/24 hr Place 1 patch onto the skin every 7 days.    cyclobenzaprine (FLEXERIL) 5 MG tablet TAKE 1 TABLET BY MOUTH DAILY AS NEEDED FOR MUSCLE SPASMS.    ENTRESTO 49-51 mg per tablet TAKE 1 TABLET BY MOUTH TWICE A DAY    epoetin david-epbx (RETACRIT INJ) Epoetin david - epbx (Retacrit)    fluticasone propionate (FLONASE) 50 mcg/actuation nasal spray 1 spray (50 mcg total) by Each Nostril route 2 (two) times daily as needed (ear fullness).    hyoscyamine (LEVSIN/SL) 0.125 mg Subl Place under the tongue.    lanthanum (FOSRENOL) 1000 MG chewable tablet Take 1 tablet by mouth.    levothyroxine (SYNTHROID) 75 MCG tablet Take 1 tablet (75 mcg total) by mouth once daily.    lidocaine-prilocaine (EMLA) cream APPLY ATLEAST 30 MINUTES BEFORE TREATMENT 3 TIMES A WEEK    LORAZEPAM INTENSOL 2 mg/mL Conc Take by mouth.    metoprolol succinate (TOPROL-XL) 100 MG 24 hr tablet Take 1/2 tablet (50mg) once daily    mirtazapine (REMERON) 15 MG tablet Take 15  mg by mouth every evening.    morphine 100 mg/5 mL (20 mg/mL) concentrated solution Take by mouth.    mv,Ca,min-folic acid-vit K1 (ONE-A-DAY WOMEN'S 50 PLUS) 400-20 mcg Tab Take 1 tablet by mouth once daily.    naloxone (NARCAN) 1 mg/mL injection 2 mg (1 mg per nostril) by Nasal route as needed for opioid overdose; may repeat in 3 to 5 minutes if not effective. Call 911    nystatin (MYCOSTATIN) cream SMARTSIG:sparingly Topical 2-4 Times Daily PRN    ondansetron (ZOFRAN-ODT) 4 MG TbDL Take by mouth.    pantoprazole (PROTONIX) 40 MG tablet Take 1 tablet (40 mg total) by mouth 2 (two) times daily.    traMADoL (ULTRAM) 50 mg tablet Take 1 tablet (50 mg total) by mouth every 12 (twelve) hours as needed for Pain.    [DISCONTINUED] amLODIPine (NORVASC) 5 MG tablet TAKE 1 TABLET BY MOUTH EVERY DAY     Family History       Problem Relation (Age of Onset)    Cataracts Maternal Aunt    Diabetes Father, Maternal Grandfather    Heart disease Sister    Hypertension Mother    Kidney disease Father    No Known Problems Sister, Sister, Brother, Brother, Maternal Uncle, Paternal Aunt, Paternal Uncle, Maternal Grandmother, Paternal Grandmother, Paternal Grandfather, Son, Son, Other          Tobacco Use    Smoking status: Never     Passive exposure: Never    Smokeless tobacco: Never   Substance and Sexual Activity    Alcohol use: No    Drug use: Yes     Types: Hydrocodone    Sexual activity: Not Currently     Review of Systems   Constitutional:  Positive for activity change and appetite change.   HENT:  Negative for congestion and dental problem.    Eyes:  Negative for discharge and itching.   Respiratory:  Negative for apnea and chest tightness.    Cardiovascular:  Negative for chest pain and leg swelling.   Gastrointestinal:  Negative for abdominal distention.   Endocrine: Negative for cold intolerance and heat intolerance.   Genitourinary:  Negative for difficulty urinating and dyspareunia.   Musculoskeletal:  Negative for  arthralgias and back pain.   Skin:  Negative for color change and pallor.   Allergic/Immunologic: Negative for environmental allergies and food allergies.   Neurological:  Positive for weakness. Negative for dizziness and facial asymmetry.   Hematological:  Negative for adenopathy. Does not bruise/bleed easily.   Psychiatric/Behavioral:  Positive for confusion.      Objective:     Vital Signs (Most Recent):  Temp: 97.6 °F (36.4 °C) (01/03/24 1120)  Pulse: 104 (01/03/24 1120)  Resp: 18 (01/03/24 1120)  BP: 117/82 (01/03/24 1120)  SpO2: 99 % (01/03/24 1120) Vital Signs (24h Range):  Temp:  [97.1 °F (36.2 °C)-99.6 °F (37.6 °C)] 97.6 °F (36.4 °C)  Pulse:  [] 104  Resp:  [17-29] 18  SpO2:  [92 %-99 %] 99 %  BP: (100-127)/(68-89) 117/82     Weight: 42.6 kg (94 lb)  Body mass index is 16.14 kg/m².     Physical Exam  HENT:      Head: Normocephalic.      Nose: Nose normal.      Mouth/Throat:      Mouth: Mucous membranes are dry.   Eyes:      Extraocular Movements: Extraocular movements intact.      Pupils: Pupils are equal, round, and reactive to light.   Cardiovascular:      Rate and Rhythm: Tachycardia present. Rhythm irregular.      Heart sounds: No murmur heard.  Pulmonary:      Effort: No respiratory distress.   Abdominal:      General: There is no distension.   Musculoskeletal:         General: No swelling or deformity.   Skin:     Coloration: Skin is not jaundiced.      Findings: No bruising.   Neurological:      Mental Status: She is alert.      Comments: Not able complete duo to confusion.   Psychiatric:      Comments: Confused,              CRANIAL NERVES     CN III, IV, VI   Pupils are equal, round, and reactive to light.       Significant Labs: All pertinent labs within the past 24 hours have been reviewed.  BMP:   Recent Labs   Lab 01/03/24  0917   GLU 73      K 4.4      CO2 23   BUN 39*   CREATININE 5.3*   CALCIUM 10.9*       CBC:   Recent Labs   Lab 01/02/24  1023 01/03/24  0917   WBC 6.14  5.76   HGB 14.0 13.3   HCT 49.8* 47.0    256       CMP:   Recent Labs   Lab 01/02/24  1023 01/03/24  0917    137   K 5.0 4.4   CL 98 101   CO2 22* 23   GLU 75 73   BUN 28* 39*   CREATININE 3.9* 5.3*   CALCIUM 9.8 10.9*   PROT 9.5*  --    ALBUMIN 3.1*  --    BILITOT 1.9*  --    ALKPHOS 606*  --    AST 63*  --    ALT 28  --    ANIONGAP 17* 13         Significant Imaging: I have reviewed all pertinent imaging results/findings within the past 24 hours.    Assessment/Plan:      * CVA (cerebral vascular accident)  head CT suspect for CVA,neurology is consulted ,can nt have MRI duo to AICD,  CTA more consistent with CVA,on ASA,statin and  PT,OT,ST is consulted.  Antithrombotics for secondary stroke prevention: Antiplatelets: Aspirin: 81 mg daily  Anticoagulants: Apixaban 2.5 mg BID     Statins for secondary stroke prevention and hyperlipidemia, if present:   Statins: Atorvastatin- 40 mg daily    Aggressive risk factor modification: HTN     Rehab efforts: The patient has been evaluated by a stroke team provider and the therapy needs have been fully considered based off the presenting complaints and exam findings. The following therapy evaluations are needed: PT evaluate and treat, OT evaluate and treat, SLP evaluate and treat    Diagnostics ordered/pending: CTA Head to assess vasculature     VTE prophylaxis:  eliquis    BP parameters: permissive hTN        Acute metabolic encephalopathy  Not sure etiology at this time.  In ER patient is febrile 100.8,chest X ray with no sign of pneumonia,Covid and flu are negative,patient received broad spectrum IV Abx,not able get urine,not urinating,blood culture is done,her HH is stable,head CT suspect for CVA,neurology is consulted ,likely duo dmitriy CVA,    Severe protein-calorie malnutrition  Nutrition consulted. Most recent weight and BMI monitored-     Measurements:  Wt Readings from Last 1 Encounters:   01/02/24 39.9 kg (88 lb)   Body mass index is 15.11 kg/m².    Patient  has been screened and assessed by RD.    Malnutrition Type:  Context:    Level:      Malnutrition Characteristic Summary:       Interventions/Recommendations (treatment strategy):         Body mass index (BMI) less than 19  Supplement when is more stable.      Metastatic renal cell carcinoma to lung, unspecified laterality    Cancer Staging   Renal cell carcinoma of left kidney  Staging form: Kidney, AJCC 8th Edition  - Pathologic stage from 1/12/2016: Stage Unknown (pT1b(2), pNX, cM0) - Unsigned  - Clinical stage from 11/22/2016: Stage IV (rcTX, cN0, pM1) - Signed by Liset Ngo NP on 9/13/2023        Hypothyroidism  TSH WNL,resumed synthroid.      Aortic atherosclerosis  On medical treatments.      PAF (paroxysmal atrial fibrillation)  Patient with Persistent (7 days or more) atrial fibrillation which is controlled currently with  OAC  . Patient is currently in atrial fibrillation.[id number] Score: 2. HASBLED Score: 3 Anticoagulation indicated. Anticoagulation done with eliquis .    Anemia in ESRD (end-stage renal disease)  Patient's anemia is currently controlled. Has not received any PRBCs to date. Etiology likely d/t Iron deficiency  Current CBC reviewed-   Lab Results   Component Value Date    HGB 14.0 01/02/2024    HCT 49.8 (H) 01/02/2024     Monitor serial CBC and transfuse if patient becomes hemodynamically unstable, symptomatic or H/H drops below 7/21.    Chronic combined systolic and diastolic congestive heart failure  Patient is identified as having Combined Systolic and Diastolic heart failure that is Chronic. CHF is currently controlled. Latest ECHO performed and demonstrates- Results for orders placed during the hospital encounter of 05/08/23    Echo Saline Bubble? No    Interpretation Summary  · The left ventricle is normal in size with mild eccentric hypertrophy and severely decreased systolic function.  · The estimated ejection fraction is 23%.  · There is severe left ventricular global  "hypokinesis.  · Grade II left ventricular diastolic dysfunction.  · Moderate left atrial enlargement.  · Normal right ventricular size with normal right ventricular systolic function.  · Moderate right atrial enlargement.  · There is mild aortic valve stenosis.  · Aortic valve area is 1.58 cm2; peak velocity is 2.01 m/s; mean gradient is 8 mmHg.  · Moderate mitral regurgitation.  · Moderate to severe tricuspid regurgitation.  · Mild pulmonic regurgitation.  · Elevated central venous pressure (15 mmHg).  · The estimated PA systolic pressure is 47 mmHg.  · There is mild-moderate pulmonary hypertension.  · Moderate posterolateral pericardial effusion. Trivial under the RA  . Continue Beta Blocker and monitor clinical status closely. Monitor on telemetry. Patient is off CHF pathway.  Monitor strict Is&Os and daily weights.  Place on fluid restriction of 1.5 L. Cardiology has not been consulted. Continue to stress to patient importance of self efficacy and  on diet for CHF. Last BNP reviewed- and noted below No results for input(s): "BNP", "BNPTRIAGEBLO" in the last 168 hours.    ESRD (end stage renal disease) on dialysis  Creatine stable for now. BMP reviewed- noted Estimated Creatinine Clearance: 9.8 mL/min (A) (based on SCr of 3.9 mg/dL (H)). according to latest data. Based on current GFR, CKD stage is end stage.  Monitor UOP and serial BMP and adjust therapy as needed. Renally dose meds. Avoid nephrotoxic medications and procedures.    Pulmonary hypertension  Fluid removal with HD      Cardiomyopathy, nonischemic  EF of 23%,will monitor.      S/p nephrectomy left  in 2016,will monitor.      History of kidney cancer  S/P left kidney nephrectomy in 2016.      Essential hypertension  Stable,will monitor.            VTE Risk Mitigation (From admission, onward)           Ordered     apixaban tablet 2.5 mg  2 times daily         01/02/24 1430                    Discharge Planning   MAVIS:      Code Status: Full Code "   Is the patient medically ready for discharge?:     Reason for patient still in hospital (select all that apply): Patient trending condition                     José Miguel Cristina MD  Department of Hospital Medicine   [facility]

## 2024-01-03 NOTE — ASSESSMENT & PLAN NOTE
- pt with ongoing severe protein calorie malnutrition  - weight: 42.6kg/94lb and BMI 16.14  -  reports some PO intake improvements since prior discharge with decrease with AMS   - contributes to appropriateness for hospice, but does not align with current GOC   - if GOC continue to be for full treatment consider restarting mirtazapine and nutrition consult when safe for PO intake   - continued  management per hospital primary

## 2024-01-03 NOTE — ASSESSMENT & PLAN NOTE
- chronic condition noted  - pt with AICD in place   - 5/23 is most recent echo on file with EF 23% at that time and Grade II left ventricular diastolic dysfunction  - contributes to overall poor condition/prognosis and contributes to pt's appropriateness for hospice but does not align with GOC at this time   - continued management per hospital primary

## 2024-01-03 NOTE — ASSESSMENT & PLAN NOTE
"1/3/2024 - Consult   - consult received; interval chart reviewed in detail; discussed pt with hospital primary, Dr. Ramires   - pt known to myself and palliative medicine team from previous admission; pt previously on hospice with [facility] and revoked 12/15 for wishes of aggressive treatment in regards to HD   - met with patient and  Otis at bedside; introduction to palliative medicine team and role in current care and admission; pt not able to participate in discussions due to AMS   - learned more about pt and ongoing care since prior admission;  reports pt has been doing overall well/better since prior admission until recently; continues to tolerate HD with no issues; but started experiencing decline/AMS prior to admission which prompted ED visit   -  explained hospice d/c due to wishing to continue with HD as pt is tolerating well; he stated that if pt were to ever not tolerate HD he would be more agreeable to stopping HD, but wishes for her to continue it at this time;  he has good insight that death would be imminent if HD were to be stopped   - GOC/ACP discussion; focus of discussion on current full code status, as pt was previously DNR last 2 admissions and completed DNR LAPOST with hospice  - in depth discussion about pt's current health status and end stage nature of multiple of pt's conditions ; explained difference between DNR and do not treat as this was a prior concern for pt,  and sister in law Karis in the past; strong recommendation for DNR as pt would not tolerate CPR/intubation well, nor would it solve any of pt's multiple end stage conditions;  seems to have good insight into this (like he did in prior admission when pt was DNR),  even grimacing and moving hands to face when discussing potential for CPR, stating "it would crush her"; I supported these thoughts and feelings  - Otis asked that I call his sister/pt's sister in law, Karis, to discuss " with her as well as he was not comfortable changing code status without her input as she is an RN; I recall Karis advocating for full code in prior admission as well; I attempted to call Karis following visit, she requested to call my back at a later time as she was working on a report   - plan for continue discussion of recommendation for DNR and encouragement that pt can still receive full treatment with DNR code status   - hospital  consulted and following pt/family   - emotional support provided   - Allowed time for questions/concerns; all addressed; expressed availability of myself/palliative team for additional questions/concerns

## 2024-01-03 NOTE — ASSESSMENT & PLAN NOTE
- on HD   - revoked hospice 12/15 with wishes to continue HD  -  wishes to continued HD as long as pt continues to tolerate it well   - continued management per primary

## 2024-01-03 NOTE — SUBJECTIVE & OBJECTIVE
Past Medical History:   Diagnosis Date    Anemia     Anticoagulant long-term use     Atrial fibrillation     Bronchitis 03/01/2017    Cancer 2016    kidney cancer    CHF (congestive heart failure), NYHA class II, chronic, systolic     CMV (cytomegalovirus) antibody positive     Encounter for blood transfusion     ESRD (end stage renal disease)     Essential hypertension 09/23/2015    H/O herpes simplex type 2 infection     Herpes simplex type 1 antibody positive     History of kidney cancer     s/p left nephrectomy 1/2016    Hyperparathyroidism, unspecified     Hyperuricemia without signs of inflammatory arthritis and tophaceous disease     Kidney stones     LGSIL (low grade squamous intraepithelial dysplasia)     Myocardiopathy 07/21/2017    Prediabetes     Proteinuria     Renal disorder     Thyroid nodule     Urate nephropathy        Past Surgical History:   Procedure Laterality Date    BREAST CYST EXCISION      COLONOSCOPY N/A 11/12/2015    COLONOSCOPY N/A 3/12/2021    Procedure: COLONOSCOPY;  Surgeon: Brendon Lanier MD;  Location: [facility];  Service: Endoscopy;  Laterality: N/A;  covid test 3/9, labs prior, prep instr mailed -ml    EXCISION OF ARTERIOVENOUS FISTULA Left 9/27/2023    Procedure: EXCISION, AV FISTULA;  Surgeon: FARIDEH Muñoz III, MD;  Location: [facility] OR [address] [place] [address];  Service: Vascular;  Laterality: Left;  LUE AV graft excision    INSERTION OF BIVENTRICULAR IMPLANTABLE CARDIOVERTER-DEFIBRILLATOR (ICD)  04/2021    INSERTION OF TUNNELED CENTRAL VENOUS CATHETER (CVC) WITH SUBCUTANEOUS PORT N/A 1/25/2023    Procedure: INSERTION, DUAL LUMEN CATHETER WITH PORT, WITH IMAGING GUIDANCE;  Surgeon: FARIDEH Muñoz III, MD;  [facility];  Service: Peripheral Vascular;  Laterality: N/A;  possinble permcath placment    NEPHRECTOMY-LAPAROSCOPIC Left 01/12/2016    PERCUTANEOUS TRANSLUMINAL ANGIOPLASTY OF ARTERIOVENOUS FISTULA Left 4/19/2023    Procedure: PTA, AV FISTULA;  Surgeon: FARIDEH Muñoz  III, MD;  Location: [facility] CATH LAB;  Service: Peripheral Vascular;  Laterality: Left;    PERITONEAL CATHETER INSERTION      Permacath insertion  01/12/2017    PLACEMENT OF ARTERIOVENOUS GRAFT  12/28/2022    Procedure: INSERTION, GRAFT, ARTERIOVENOUS;  Surgeon: FARIDEH Muñoz III, MD;  Location: [facility] OR [facility];  Service: Peripheral Vascular;;    REMOVAL, GRAFT, ARTERIOVENOUS, UPPER EXTREMITY Left 1/25/2023    Procedure: REMOVAL, GRAFT, ARTERIOVENOUS, UPPER EXTREMITY;  Surgeon: FARIDEH Muñoz III, MD;  [facility];  Service: Peripheral Vascular;  Laterality: Left;    REVISION OF ARTERIOVENOUS FISTULA Left 5/1/2023    Procedure: REVISION, AV FISTULA;  Surgeon: FARIDEH Muñoz III, MD;  [facility];  Service: Peripheral Vascular;  Laterality: Left;  LUE AVG revision    REVISION OF PROCEDURE INVOLVING ARTERIOVENOUS GRAFT Left 12/28/2022    Procedure: REVISION, PROCEDURE INVOLVING ARTERIOVENOUS GRAFT;  Surgeon: FARIDEH Muñoz III, MD;  [facility];  Service: Peripheral Vascular;  Laterality: Left;    REVISION OF PROCEDURE INVOLVING ARTERIOVENOUS GRAFT Left 7/21/2023    Procedure: LEFT ARM ARTERIOVENOUS GRAFT EXCISION  AND LIGATION;  Surgeon: Obi Werner MD;  Location: [facility] OR;  Service: Vascular;  Laterality: Left;  Left AVG excision and revision with interposition    SALPINGOOPHORECTOMY Right 2016    KJB---DAVINCI    TONSILLECTOMY      TUBAL LIGATION         Review of patient's allergies indicates:   Allergen Reactions    Coreg [carvedilol] Other (See Comments)     Nausea/vomiting    Allopurinol      Other reaction(s): abnormal transaminases       Current Facility-Administered Medications on File Prior to Encounter   Medication    0.9%  NaCl infusion     Current Outpatient Medications on File Prior to Encounter   Medication Sig    acetaminophen (TYLENOL) 500 MG tablet Take 500 mg by mouth every 6 (six) hours as needed for Pain.    albuterol-ipratropium (DUO-NEB)  2.5 mg-0.5 mg/3 mL nebulizer solution Take by nebulization.    apixaban (ELIQUIS) 2.5 mg Tab Take 1 tablet (2.5 mg total) by mouth 2 (two) times daily.    bisacodyL (DULCOLAX) 10 mg Supp Place 10 mg rectally.    cabozantinib (CABOMETYX) 60 mg Tab TAKE ONE TABLET BY MOUTH ONCE DAILY AT THE SAME TIME ON AN EMPTY STOMACH AT LEAST 1 HOUR BEFORE OR 2 HOURS AFTER EATING. AVOID GRAPEFRUIT PRODUCTS    cloNIDine 0.3 mg/24 hr td ptwk (CATAPRES) 0.3 mg/24 hr Place 1 patch onto the skin every 7 days.    cyclobenzaprine (FLEXERIL) 5 MG tablet TAKE 1 TABLET BY MOUTH DAILY AS NEEDED FOR MUSCLE SPASMS.    ENTRESTO 49-51 mg per tablet TAKE 1 TABLET BY MOUTH TWICE A DAY    epoetin david-epbx (RETACRIT INJ) Epoetin david - epbx (Retacrit)    fluticasone propionate (FLONASE) 50 mcg/actuation nasal spray 1 spray (50 mcg total) by Each Nostril route 2 (two) times daily as needed (ear fullness).    hyoscyamine (LEVSIN/SL) 0.125 mg Subl Place under the tongue.    lanthanum (FOSRENOL) 1000 MG chewable tablet Take 1 tablet by mouth.    levothyroxine (SYNTHROID) 75 MCG tablet Take 1 tablet (75 mcg total) by mouth once daily.    lidocaine-prilocaine (EMLA) cream APPLY ATLEAST 30 MINUTES BEFORE TREATMENT 3 TIMES A WEEK    LORAZEPAM INTENSOL 2 mg/mL Conc Take by mouth.    metoprolol succinate (TOPROL-XL) 100 MG 24 hr tablet Take 1/2 tablet (50mg) once daily    mirtazapine (REMERON) 15 MG tablet Take 15 mg by mouth every evening.    morphine 100 mg/5 mL (20 mg/mL) concentrated solution Take by mouth.    mv,Ca,min-folic acid-vit K1 (ONE-A-DAY WOMEN'S 50 PLUS) 400-20 mcg Tab Take 1 tablet by mouth once daily.    naloxone (NARCAN) 1 mg/mL injection 2 mg (1 mg per nostril) by Nasal route as needed for opioid overdose; may repeat in 3 to 5 minutes if not effective. Call 911    nystatin (MYCOSTATIN) cream SMARTSIG:sparingly Topical 2-4 Times Daily PRN    ondansetron (ZOFRAN-ODT) 4 MG TbDL Take by mouth.    pantoprazole (PROTONIX) 40 MG tablet Take 1  tablet (40 mg total) by mouth 2 (two) times daily.    traMADoL (ULTRAM) 50 mg tablet Take 1 tablet (50 mg total) by mouth every 12 (twelve) hours as needed for Pain.    [DISCONTINUED] amLODIPine (NORVASC) 5 MG tablet TAKE 1 TABLET BY MOUTH EVERY DAY     Family History       Problem Relation (Age of Onset)    Cataracts Maternal Aunt    Diabetes Father, Maternal Grandfather    Heart disease Sister    Hypertension Mother    Kidney disease Father    No Known Problems Sister, Sister, Brother, Brother, Maternal Uncle, Paternal Aunt, Paternal Uncle, Maternal Grandmother, Paternal Grandmother, Paternal Grandfather, Son, Son, Other          Tobacco Use    Smoking status: Never     Passive exposure: Never    Smokeless tobacco: Never   Substance and Sexual Activity    Alcohol use: No    Drug use: Yes     Types: Hydrocodone    Sexual activity: Not Currently     Review of Systems   Constitutional:  Positive for activity change and appetite change.   HENT:  Negative for congestion and dental problem.    Eyes:  Negative for discharge and itching.   Respiratory:  Negative for apnea and chest tightness.    Cardiovascular:  Negative for chest pain and leg swelling.   Gastrointestinal:  Negative for abdominal distention.   Endocrine: Negative for cold intolerance and heat intolerance.   Genitourinary:  Negative for difficulty urinating and dyspareunia.   Musculoskeletal:  Negative for arthralgias and back pain.   Skin:  Negative for color change and pallor.   Allergic/Immunologic: Negative for environmental allergies and food allergies.   Neurological:  Positive for weakness. Negative for dizziness and facial asymmetry.   Hematological:  Negative for adenopathy. Does not bruise/bleed easily.   Psychiatric/Behavioral:  Positive for confusion.      Objective:     Vital Signs (Most Recent):  Temp: 97.6 °F (36.4 °C) (01/03/24 1120)  Pulse: 104 (01/03/24 1120)  Resp: 18 (01/03/24 1120)  BP: 117/82 (01/03/24 1120)  SpO2: 99 % (01/03/24  1120) Vital Signs (24h Range):  Temp:  [97.1 °F (36.2 °C)-99.6 °F (37.6 °C)] 97.6 °F (36.4 °C)  Pulse:  [] 104  Resp:  [17-29] 18  SpO2:  [92 %-99 %] 99 %  BP: (100-127)/(68-89) 117/82     Weight: 42.6 kg (94 lb)  Body mass index is 16.14 kg/m².     Physical Exam  HENT:      Head: Normocephalic.      Nose: Nose normal.      Mouth/Throat:      Mouth: Mucous membranes are dry.   Eyes:      Extraocular Movements: Extraocular movements intact.      Pupils: Pupils are equal, round, and reactive to light.   Cardiovascular:      Rate and Rhythm: Tachycardia present. Rhythm irregular.      Heart sounds: No murmur heard.  Pulmonary:      Effort: No respiratory distress.   Abdominal:      General: There is no distension.   Musculoskeletal:         General: No swelling or deformity.   Skin:     Coloration: Skin is not jaundiced.      Findings: No bruising.   Neurological:      Mental Status: She is alert.      Comments: Not able complete duo to confusion.   Psychiatric:      Comments: Confused,              CRANIAL NERVES     CN III, IV, VI   Pupils are equal, round, and reactive to light.       Significant Labs: All pertinent labs within the past 24 hours have been reviewed.  BMP:   Recent Labs   Lab 01/03/24  0917   GLU 73      K 4.4      CO2 23   BUN 39*   CREATININE 5.3*   CALCIUM 10.9*       CBC:   Recent Labs   Lab 01/02/24  1023 01/03/24  0917   WBC 6.14 5.76   HGB 14.0 13.3   HCT 49.8* 47.0    256       CMP:   Recent Labs   Lab 01/02/24  1023 01/03/24  0917    137   K 5.0 4.4   CL 98 101   CO2 22* 23   GLU 75 73   BUN 28* 39*   CREATININE 3.9* 5.3*   CALCIUM 9.8 10.9*   PROT 9.5*  --    ALBUMIN 3.1*  --    BILITOT 1.9*  --    ALKPHOS 606*  --    AST 63*  --    ALT 28  --    ANIONGAP 17* 13         Significant Imaging: I have reviewed all pertinent imaging results/findings within the past 24 hours.

## 2024-01-03 NOTE — PROGRESS NOTES
[facility] - Med Surg  Adult Nutrition  Consult Note    SUMMARY     Recommendations    Continue regular diet; texture per SLP; consider ONS Boost to help meet EEN/EPN  Continue to monitor weights and labs  Collaboration with medical providers    Goals: 1. Pt to meet % EEN/EPN by RD follow up  Nutrition Goal Status: new  Communication of RD Recs:  (POC)    Assessment and Plan    Nutrition Problem:  Moderate/Severe Protein-Calorie Malnutrition  Malnutrition in the context of Chronic Illness/Injury    Related to (etiology):  BMI 16.14    Signs and Symptoms (as evidenced by):  Energy Intake: <75% of estimated energy requirement for >1 year  Body Fat Depletion: severe depletion of orbitals, triceps, and thoracic and lumbar region   Muscle Mass Depletion: severe depletion of temples, clavicle region, scapular region, interosseous muscle, and lower extremities       Interventions(treatment strategy):  Collaboration with medical providers    Nutrition Diagnosis Status:  New        Reason for Assessment    Reason For Assessment: identified at risk by screening criteria  Relevant Medical History:   Past Medical History:   Diagnosis Date    Anemia     Anticoagulant long-term use     Atrial fibrillation     Bronchitis 03/01/2017    Cancer 2016    kidney cancer    CHF (congestive heart failure), NYHA class II, chronic, systolic     CMV (cytomegalovirus) antibody positive     CVA (cerebral vascular accident) 1/3/2024    Encounter for blood transfusion     ESRD (end stage renal disease)     Essential hypertension 09/23/2015    H/O herpes simplex type 2 infection     Herpes simplex type 1 antibody positive     History of kidney cancer     s/p left nephrectomy 1/2016    Hyperparathyroidism, unspecified     Hyperuricemia without signs of inflammatory arthritis and tophaceous disease     Kidney stones     LGSIL (low grade squamous intraepithelial dysplasia)     Myocardiopathy 07/21/2017    Prediabetes     Proteinuria     Renal  "disorder     Thyroid nodule     Urate nephropathy       Interdisciplinary Rounds: did not attend  General Information Comments: Pt identified as at risk per malnutrition screening tool. Currently on Soft and Bite sized thin diet with no intake recorded in chart. BMI 16.20, protein energy malnutrition grade ll. No recent significant weight changes per chart review. Spoke with family member in the room. Reported no recent significant weight changes; patient has always been around 100 lbs. NFPE performed 1/3/23, pt appears severely malnourished.  Nutrition Discharge Planning: pending medical course    Nutrition Risk Screen    Nutrition Risk Screen: difficulty chewing/swallowing, reduced oral intake over the last month    Nutrition/Diet History    Food Allergies: NKFA    Anthropometrics    Temp: 97.6 °F (36.4 °C)  Height Method: Stated  Height: 5' 4" (162.6 cm)  Height (inches): 64 in  Weight Method: Bed Scale  Weight: 42.6 kg (94 lb)  Weight (lb): 94 lb  Ideal Body Weight (IBW), Female: 120 lb  % Ideal Body Weight, Female (lb): 78.33 %  BMI (Calculated): 16.1  BMI Grade: 16 - 16.9 protein-energy malnutrition grade II       Lab/Procedures/Meds    Pertinent Labs Reviewed: reviewed  Pertinent Medications Reviewed: reviewed    Physical Findings/Assessment         Estimated/Assessed Needs    Weight Used For Calorie Calculations: 54.4 kg (120 lb)  Energy Calorie Requirements (kcal): 1435 kcal  Energy Need Method: Rippey-St Jeor (x 1.3 ibw)  Protein Requirements: 54 g (1.0 g/kg ibw)  Weight Used For Protein Calculations: 54.4 kg (120 lb)     Estimated Fluid Requirement Method: RDA Method  RDA Method (mL): 1435         Nutrition Prescription Ordered    Current Diet Order: soft and bite sized    Evaluation of Received Nutrient/Fluid Intake    I/O: +750 mL  Energy Calories Required: not meeting needs  Protein Required: not meeting needs  Fluid Required: not meeting needs  Comments: lbm 1/3/23  % Intake of Estimated Energy " Needs: 25 - 50 %  % Meal Intake: 25 - 50 %    Nutrition Risk    Level of Risk/Frequency of Follow-up: moderate       Monitor and Evaluation    Food and Nutrient Intake: food and beverage intake  Food and Nutrient Adminstration: diet order  Knowledge/Beliefs/Attitudes: food and nutrition knowledge/skill  Physical Activity and Function: nutrition-related ADLs and IADLs, factors affecting access to physical activity  Anthropometric Measurements: weight, weight change, body mass index  Biochemical Data, Medical Tests and Procedures: inflammatory profile  Nutrition-Focused Physical Findings: overall appearance       Nutrition Follow-Up    RD Follow-up?: Yes    Yue Valdes, Registration Eligible, Provisional LDN

## 2024-01-03 NOTE — ASSESSMENT & PLAN NOTE
- pt presented with significant AMS   - CTA head: : Focal calcification along the expected left proximal M2 segment of the MCA concerning for nonocclusive calcified embolus   - neurology consulted/following; EEG pending   - pt moved all extremities during my assessment, but remains with  -  with good insight into probability of stroke; discussed correlation of metastatic cancer, a fib, and strokes; correlation between metastatic/end stage cancer and clots/stroke did seem some what new info for   - plan for continued discussion following CVA work up and input from neurology   - continued management per hospital primary and neurology

## 2024-01-03 NOTE — HOSPITAL COURSE
59 yo F with a PMHx of CHF with EF of 23%, ESRD on dialysis (TTS), Afib on OAC, AICD placement (currently disconnected per ), RCC with metastatic disease to lung and pancreas, presenting to the ED BIB  for altered mental status. She currently has a port a cath to her chest wall for dialysis, and is anuric, no recent missed sessions.  also reports she received a dose of the COVID vaccine around 4 days ago.   In ER patient is febrile 100.8,chest X ray with no sign of pneumonia,Covid and flu are negative,patient received broad spectrum IV Abx,not able get urine,not urinating,blood culture is done,her HH is stable,head CT suspect for CVA,neurology is consulted ,can not have MRI duo to AICD,  CTA more consistent with CVA,was on ASA,statin and  PT,OT,ST is consulted.  In next day was Much more alert ,did well with PT,OT.  Patient had her routine HD by nephrology.  At DC time patient was  alert time 3,patient was discharged home with ASA,statin and HH and follow up with PCP as out patient.

## 2024-01-03 NOTE — ASSESSMENT & PLAN NOTE
head CT suspect for CVA,neurology is consulted ,can nt have MRI duo to AICD,  CTA more consistent with CVA,on ASA,statin and  PT,OT,ST is consulted.  Antithrombotics for secondary stroke prevention: Antiplatelets: Aspirin: 81 mg daily  Anticoagulants: Apixaban 2.5 mg BID     Statins for secondary stroke prevention and hyperlipidemia, if present:   Statins: Atorvastatin- 40 mg daily    Aggressive risk factor modification: HTN     Rehab efforts: The patient has been evaluated by a stroke team provider and the therapy needs have been fully considered based off the presenting complaints and exam findings. The following therapy evaluations are needed: PT evaluate and treat, OT evaluate and treat, SLP evaluate and treat    Diagnostics ordered/pending: CTA Head to assess vasculature     VTE prophylaxis:  eliquis    BP parameters: permissive hTN

## 2024-01-03 NOTE — NURSING
Patient arrived to floor via stretcher transported from ED. Patient transferred to bed via x3 person assist.  AAOx3. Patient was oriented to room, information on whiteboard, and medication regimen. Bed low, adequate lighting provided, side rails x2 up, call bell within reach. Admission assessment completed.  VSS. Patient denied having any acute distress at this time. None observed. Will continue to monitor and follow treatment plan.  Family at bedside.      [facility] [place]  Nurses Note -- 4 Eyes      1/2/2024       Skin assessed on: Admit      [] No Pressure Injuries Present    []Prevention Measures Documented    [x] Yes LDA  for Pressure Injury Previously documented     [] Yes New Pressure Injury Discovered   [] LDA for New Pressure Injury Added      Attending RN:  Diana Pompa LPN     Second RN:  Urvashi Talamantes, RN

## 2024-01-03 NOTE — SUBJECTIVE & OBJECTIVE
Past Medical History:   Diagnosis Date    Anemia     Anticoagulant long-term use     Atrial fibrillation     Bronchitis 03/01/2017    Cancer 2016    kidney cancer    CHF (congestive heart failure), NYHA class II, chronic, systolic     CMV (cytomegalovirus) antibody positive     CVA (cerebral vascular accident) 1/3/2024    Encounter for blood transfusion     ESRD (end stage renal disease)     Essential hypertension 09/23/2015    H/O herpes simplex type 2 infection     Herpes simplex type 1 antibody positive     History of kidney cancer     s/p left nephrectomy 1/2016    Hyperparathyroidism, unspecified     Hyperuricemia without signs of inflammatory arthritis and tophaceous disease     Kidney stones     LGSIL (low grade squamous intraepithelial dysplasia)     Myocardiopathy 07/21/2017    Prediabetes     Proteinuria     Renal disorder     Thyroid nodule     Urate nephropathy      Past Surgical History:   Procedure Laterality Date    BREAST CYST EXCISION      COLONOSCOPY N/A 11/12/2015    COLONOSCOPY N/A 3/12/2021    Procedure: COLONOSCOPY;  Surgeon: Brendon Lanier MD;  Location: [facility];  Service: Endoscopy;  Laterality: N/A;  covid test 3/9, labs prior, prep instr mailed -ml    EXCISION OF ARTERIOVENOUS FISTULA Left 9/27/2023    Procedure: EXCISION, AV FISTULA;  Surgeon: FARIDEH Muñoz III, MD;  Location: [facility] OR [address] [place] [address];  Service: Vascular;  Laterality: Left;  LUE AV graft excision    INSERTION OF BIVENTRICULAR IMPLANTABLE CARDIOVERTER-DEFIBRILLATOR (ICD)  04/2021    INSERTION OF TUNNELED CENTRAL VENOUS CATHETER (CVC) WITH SUBCUTANEOUS PORT N/A 1/25/2023    Procedure: INSERTION, DUAL LUMEN CATHETER WITH PORT, WITH IMAGING GUIDANCE;  Surgeon: FARIDEH Muñoz III, MD;  [facility];  Service: Peripheral Vascular;  Laterality: N/A;  possinble permcath placment    NEPHRECTOMY-LAPAROSCOPIC Left 01/12/2016    PERCUTANEOUS TRANSLUMINAL ANGIOPLASTY OF ARTERIOVENOUS FISTULA Left 4/19/2023    Procedure:  PTA, AV FISTULA;  Surgeon: FARIDEH Muñoz III, MD;  Location: [facility] CATH LAB;  Service: Peripheral Vascular;  Laterality: Left;    PERITONEAL CATHETER INSERTION      Permacath insertion  01/12/2017    PLACEMENT OF ARTERIOVENOUS GRAFT  12/28/2022    Procedure: INSERTION, GRAFT, ARTERIOVENOUS;  Surgeon: FARIDEH Muñoz III, MD;  Location: [facility] OR [facility];  Service: Peripheral Vascular;;    REMOVAL, GRAFT, ARTERIOVENOUS, UPPER EXTREMITY Left 1/25/2023    Procedure: REMOVAL, GRAFT, ARTERIOVENOUS, UPPER EXTREMITY;  Surgeon: FARIDEH Muñoz III, MD;  Location: [facility] OR [facility];  Service: Peripheral Vascular;  Laterality: Left;    REVISION OF ARTERIOVENOUS FISTULA Left 5/1/2023    Procedure: REVISION, AV FISTULA;  Surgeon: FARIDEH Muñoz III, MD;  [facility];  Service: Peripheral Vascular;  Laterality: Left;  LUE AVG revision    REVISION OF PROCEDURE INVOLVING ARTERIOVENOUS GRAFT Left 12/28/2022    Procedure: REVISION, PROCEDURE INVOLVING ARTERIOVENOUS GRAFT;  Surgeon: FARIDEH Muñoz III, MD;  [facility];  Service: Peripheral Vascular;  Laterality: Left;    REVISION OF PROCEDURE INVOLVING ARTERIOVENOUS GRAFT Left 7/21/2023    Procedure: LEFT ARM ARTERIOVENOUS GRAFT EXCISION  AND LIGATION;  Surgeon: Obi Werner MD;  Location: [facility];  Service: Vascular;  Laterality: Left;  Left AVG excision and revision with interposition    SALPINGOOPHORECTOMY Right 2016    KJB---DAVINCI    TONSILLECTOMY      TUBAL LIGATION       Review of patient's allergies indicates:   Allergen Reactions    Coreg [carvedilol] Other (See Comments)     Nausea/vomiting    Allopurinol      Other reaction(s): abnormal transaminases     Medications:  Continuous Infusions:  Scheduled Meds:   apixaban  2.5 mg Oral BID    aspirin  81 mg Oral Daily    atorvastatin  40 mg Oral Daily    famotidine  20 mg Oral Daily    levothyroxine  75 mcg Oral Before breakfast    mupirocin   Nasal BID     PRN Meds:acetaminophen,  albuterol-ipratropium    Family History       Problem Relation (Age of Onset)    Cataracts Maternal Aunt    Diabetes Father, Maternal Grandfather    Heart disease Sister    Hypertension Mother    Kidney disease Father    No Known Problems Sister, Sister, Brother, Brother, Maternal Uncle, Paternal Aunt, Paternal Uncle, Maternal Grandmother, Paternal Grandmother, Paternal Grandfather, Son, Son, Other          Tobacco Use    Smoking status: Never     Passive exposure: Never    Smokeless tobacco: Never   Substance and Sexual Activity    Alcohol use: No    Drug use: Yes     Types: Hydrocodone    Sexual activity: Not Currently     Review of Systems   Constitutional:  Positive for activity change and fatigue. Negative for appetite change (consistently low appetite).   Neurological:  Positive for weakness.   Psychiatric/Behavioral:  Positive for confusion.      Objective:     Vital Signs (Most Recent):  Temp: 97.6 °F (36.4 °C) (01/03/24 1120)  Pulse: 104 (01/03/24 1120)  Resp: 18 (01/03/24 1120)  BP: 117/82 (01/03/24 1120)  SpO2: 99 % (01/03/24 1120) Vital Signs (24h Range):  Temp:  [97.1 °F (36.2 °C)-99.6 °F (37.6 °C)] 97.6 °F (36.4 °C)  Pulse:  [] 104  Resp:  [17-29] 18  SpO2:  [92 %-99 %] 99 %  BP: (100-127)/(68-89) 117/82     Weight: 42.6 kg (94 lb)  Body mass index is 16.14 kg/m².     Physical Exam  Vitals and nursing note reviewed.   Constitutional:       General: She is awake.      Appearance: She is cachectic.   HENT:      Head: Atraumatic.      Comments: Ranjan temporal wasting      Mouth/Throat:      Mouth: Mucous membranes are dry.   Pulmonary:      Effort: Pulmonary effort is normal. No respiratory distress.   Musculoskeletal:      Comments: Muscular wasting to all extremities    Skin:     Comments: Clean intact dressings to bilateral heels    Neurological:      Mental Status: She is alert. She is disoriented.   Psychiatric:         Behavior: Behavior is uncooperative.        Advance Care Planning   Advance  Directives:   Living Will: No    [place]: Yes (prior DNR [facility] signed with Hospice)    Do Not Resuscitate Status: No (previously DNR with current wishes for full code; currently discussing with family with some percieved confusion with DNR vs do not treat;  with good insight that CPR would be harmful to pt at this time and in current state)    Medical Power of : No    Goals of Care: What is most important right now is to focus on spending time at home, avoiding the hospital, remaining as independent as possible, quality of life, even if it means sacrificing a little time, improvement in condition but with limits to invasive therapies, comfort and QOL . Accordingly, we have decided that the best plan to meet the patient's goals includes continuing with treatment.     Significant Labs: All pertinent labs within the past 24 hours have been reviewed.    CBC:   Recent Labs   Lab 01/03/24  0917   WBC 5.76   HGB 13.3   HCT 47.0   MCV 89        BMP:  Recent Labs   Lab 01/03/24  0917   GLU 73      K 4.4      CO2 23   BUN 39*   CREATININE 5.3*   CALCIUM 10.9*     LFT:  Lab Results   Component Value Date    AST 63 (H) 01/02/2024     (H) 10/12/2006    ALKPHOS 606 (H) 01/02/2024    BILITOT 1.9 (H) 01/02/2024     Albumin:   Albumin   Date Value Ref Range Status   01/02/2024 3.1 (L) 3.5 - 5.2 g/dL Final     Protein:   Total Protein   Date Value Ref Range Status   01/02/2024 9.5 (H) 6.0 - 8.4 g/dL Final     Lactic acid:   Lab Results   Component Value Date    LACTATE 2.9 (H) 01/02/2024    LACTATE 1.6 10/16/2023     Significant Imaging: I have reviewed all pertinent imaging results/findings within the past 24 hours.

## 2024-01-03 NOTE — PLAN OF CARE
No acute distress noted, patient free from falls or injury this shift.  Bed in low position, wheels locked, call light in reach for assistance, plan of care continued.      Problem: Device-Related Complication Risk (Hemodialysis)  Goal: Safe, Effective Therapy Delivery  Outcome: Ongoing, Progressing     Problem: Hemodynamic Instability (Hemodialysis)  Goal: Effective Tissue Perfusion  Outcome: Ongoing, Progressing

## 2024-01-03 NOTE — PROGRESS NOTES
[facility] - Emergency Dept  Neurology Consult Note    Patient Name: Eva Bloom  Age: 58 y.o.  MRN: [id number]  Admission Date: 1/2/2024  Reason for consult:  Altered mental status    CC:  Altered mental status    HPI:   Eva Bloom is a 58 y.o. year old female with past medical history of EF of 23%, ESRD on dialysis (TTS), Afib on OAC, AICD placement (currently disconnected per ), RCC with metastatic disease to lung and pancreas, presenting to the ED for altered mental status.  History obtained by patient's  and chart review.     states that patient had been acting of since Saturday.  He thought that she had some nonsensical speech on Saturday which he later things cleared after she took a nap.  But through the weekend her symptoms continued to worsen to the point that her speech was not making any sense last night.  She was also noted hallucinating and speaking to her sons who were not in the room.   wanted to complete her dialysis today and then bring her over to the ER.  He denies any new focal neurological deficits although he states that she had been walking slower using the walker than her baseline.  Patient is otherwise able to hold a conversation, memory is intact at baseline and uses a walker for ambulation.    In the ED, patient was noted to be febrile upon arrival to 100.8 T-max.  Initial basic blood work as well as Infectious surveillance including chest x-ray, urinalysis were all unremarkable.  CT head was obtained which was motion degraded but there was a concern for hypodensity in the left frontal region.      Histories:  Allergies:  Coreg [carvedilol] and Allopurinol    Current Medications:    Current Facility-Administered Medications   Medication Dose Route Frequency Provider Last Rate Last Admin    acetaminophen tablet 650 mg  650 mg Oral Q6H PRN José Miguel Cristina MD        albuterol-ipratropium 2.5 mg-0.5 mg/3 mL nebulizer solution 3 mL  3 mL Nebulization  Q6H PRN José Miguel Cristina MD        apixaban tablet 2.5 mg  2.5 mg Oral BID José Miguel Cristina MD        aspirin EC tablet 81 mg  81 mg Oral Daily José Miguel Cristina MD   81 mg at 01/02/24 1718    atorvastatin tablet 40 mg  40 mg Oral Daily José Miguel Cristina MD   40 mg at 01/02/24 1718    famotidine tablet 20 mg  20 mg Oral Daily José Miguel Cristina MD   20 mg at 01/02/24 1718    [START ON 1/3/2024] levothyroxine tablet 75 mcg  75 mcg Oral Before breakfast José Miguel Cristina MD        mupirocin 2 % ointment   Nasal BID Claude Allan MD        piperacillin-tazobactam (ZOSYN) 4.5 g in dextrose 5 % in water (D5W) 100 mL IVPB (MB+)  4.5 g Intravenous Q8H Michael Torres MD   Stopped at 01/02/24 1207     Current Outpatient Medications   Medication Sig Dispense Refill    acetaminophen (TYLENOL) 500 MG tablet Take 500 mg by mouth every 6 (six) hours as needed for Pain.      albuterol-ipratropium (DUO-NEB) 2.5 mg-0.5 mg/3 mL nebulizer solution Take by nebulization.      apixaban (ELIQUIS) 2.5 mg Tab Take 1 tablet (2.5 mg total) by mouth 2 (two) times daily. 60 tablet 11    bisacodyL (DULCOLAX) 10 mg Supp Place 10 mg rectally.      cabozantinib (CABOMETYX) 60 mg Tab TAKE ONE TABLET BY MOUTH ONCE DAILY AT THE SAME TIME ON AN EMPTY STOMACH AT LEAST 1 HOUR BEFORE OR 2 HOURS AFTER EATING. AVOID GRAPEFRUIT PRODUCTS 30 tablet 4    cloNIDine 0.3 mg/24 hr td ptwk (CATAPRES) 0.3 mg/24 hr Place 1 patch onto the skin every 7 days. 4 patch 11    cyclobenzaprine (FLEXERIL) 5 MG tablet TAKE 1 TABLET BY MOUTH DAILY AS NEEDED FOR MUSCLE SPASMS. 30 tablet 1    ENTRESTO 49-51 mg per tablet TAKE 1 TABLET BY MOUTH TWICE A  tablet 3    epoetin david-epbx (RETACRIT INJ) Epoetin david - epbx (Retacrit)      fluticasone propionate (FLONASE) 50 mcg/actuation nasal spray 1 spray (50 mcg total) by Each Nostril route 2 (two) times daily as needed (ear fullness). 16 g 2    hyoscyamine (LEVSIN/SL) 0.125  mg Subl Place under the tongue.      lanthanum (FOSRENOL) 1000 MG chewable tablet Take 1 tablet by mouth.      levothyroxine (SYNTHROID) 75 MCG tablet Take 1 tablet (75 mcg total) by mouth once daily. 30 tablet 11    lidocaine-prilocaine (EMLA) cream APPLY ATLEAST 30 MINUTES BEFORE TREATMENT 3 TIMES A WEEK 30 g 11    LORAZEPAM INTENSOL 2 mg/mL Conc Take by mouth.      metoprolol succinate (TOPROL-XL) 100 MG 24 hr tablet Take 1/2 tablet (50mg) once daily 90 tablet 3    mirtazapine (REMERON) 15 MG tablet Take 15 mg by mouth every evening.      morphine 100 mg/5 mL (20 mg/mL) concentrated solution Take by mouth.      mv,Ca,min-folic acid-vit K1 (ONE-A-DAY WOMEN'S 50 PLUS) 400-20 mcg Tab Take 1 tablet by mouth once daily.      naloxone (NARCAN) 1 mg/mL injection 2 mg (1 mg per nostril) by Nasal route as needed for opioid overdose; may repeat in 3 to 5 minutes if not effective. Call 911 2 mL 11    nystatin (MYCOSTATIN) cream SMARTSIG:sparingly Topical 2-4 Times Daily PRN      ondansetron (ZOFRAN-ODT) 4 MG TbDL Take by mouth.      pantoprazole (PROTONIX) 40 MG tablet Take 1 tablet (40 mg total) by mouth 2 (two) times daily. 60 tablet 11    traMADoL (ULTRAM) 50 mg tablet Take 1 tablet (50 mg total) by mouth every 12 (twelve) hours as needed for Pain. 30 each 1     Facility-Administered Medications Ordered in Other Encounters   Medication Dose Route Frequency Provider Last Rate Last Admin    0.9%  NaCl infusion   Intravenous Continuous Soni Bhatti MD   New Bag at 07/21/23 1116       Medical:   Past Medical History:   Diagnosis Date    Anemia     Anticoagulant long-term use     Atrial fibrillation     Bronchitis 03/01/2017    Cancer 2016    kidney cancer    CHF (congestive heart failure), NYHA class II, chronic, systolic     CMV (cytomegalovirus) antibody positive     Encounter for blood transfusion     ESRD (end stage renal disease)     Essential hypertension 09/23/2015    H/O herpes simplex type 2 infection     Herpes  simplex type 1 antibody positive     History of kidney cancer     s/p left nephrectomy 1/2016    Hyperparathyroidism, unspecified     Hyperuricemia without signs of inflammatory arthritis and tophaceous disease     Kidney stones     LGSIL (low grade squamous intraepithelial dysplasia)     Myocardiopathy 07/21/2017    Prediabetes     Proteinuria     Renal disorder     Thyroid nodule     Urate nephropathy         Surgeries:   Past Surgical History:   Procedure Laterality Date    BREAST CYST EXCISION      COLONOSCOPY N/A 11/12/2015    COLONOSCOPY N/A 3/12/2021    Procedure: COLONOSCOPY;  Surgeon: Brendon Lanier MD;  Location: [facility] ENDO;  Service: Endoscopy;  Laterality: N/A;  covid test 3/9, labs prior, prep instr mailed -ml    EXCISION OF ARTERIOVENOUS FISTULA Left 9/27/2023    Procedure: EXCISION, AV FISTULA;  Surgeon: FARIDEH Muñoz III, MD;  [facility];  Service: Vascular;  Laterality: Left;  LUE AV graft excision    INSERTION OF BIVENTRICULAR IMPLANTABLE CARDIOVERTER-DEFIBRILLATOR (ICD)  04/2021    INSERTION OF TUNNELED CENTRAL VENOUS CATHETER (CVC) WITH SUBCUTANEOUS PORT N/A 1/25/2023    Procedure: INSERTION, DUAL LUMEN CATHETER WITH PORT, WITH IMAGING GUIDANCE;  Surgeon: FARIDEH Muñoz III, MD;  [facility];  Service: Peripheral Vascular;  Laterality: N/A;  possinble permcath placment    NEPHRECTOMY-LAPAROSCOPIC Left 01/12/2016    PERCUTANEOUS TRANSLUMINAL ANGIOPLASTY OF ARTERIOVENOUS FISTULA Left 4/19/2023    Procedure: PTA, AV FISTULA;  Surgeon: FARIDEH Muñoz III, MD;  Location: [facility] CATH LAB;  Service: Peripheral Vascular;  Laterality: Left;    PERITONEAL CATHETER INSERTION      Permacath insertion  01/12/2017    PLACEMENT OF ARTERIOVENOUS GRAFT  12/28/2022    Procedure: INSERTION, GRAFT, ARTERIOVENOUS;  Surgeon: FARIDEH Muñoz III, MD;  [facility];  Service: Peripheral Vascular;;    REMOVAL, GRAFT, ARTERIOVENOUS, UPPER EXTREMITY Left 1/25/2023     Procedure: REMOVAL, GRAFT, ARTERIOVENOUS, UPPER EXTREMITY;  Surgeon: FARIDEH Muñoz III, MD;  [facility];  Service: Peripheral Vascular;  Laterality: Left;    REVISION OF ARTERIOVENOUS FISTULA Left 5/1/2023    Procedure: REVISION, AV FISTULA;  Surgeon: FARIDEH Muñoz III, MD;  [facility];  Service: Peripheral Vascular;  Laterality: Left;  LUE AVG revision    REVISION OF PROCEDURE INVOLVING ARTERIOVENOUS GRAFT Left 12/28/2022    Procedure: REVISION, PROCEDURE INVOLVING ARTERIOVENOUS GRAFT;  Surgeon: FARIDEH Muñoz III, MD;  [facility];  Service: Peripheral Vascular;  Laterality: Left;    REVISION OF PROCEDURE INVOLVING ARTERIOVENOUS GRAFT Left 7/21/2023    Procedure: LEFT ARM ARTERIOVENOUS GRAFT EXCISION  AND LIGATION;  Surgeon: Obi Werner MD;  Location: [facility];  Service: Vascular;  Laterality: Left;  Left AVG excision and revision with interposition    SALPINGOOPHORECTOMY Right 2016    KJB---DAVINCI    TONSILLECTOMY      TUBAL LIGATION          Family:   Family History   Problem Relation Age of Onset    Hypertension Mother     Diabetes Father     Kidney disease Father     No Known Problems Sister     Heart disease Sister     No Known Problems Sister     No Known Problems Brother     No Known Problems Brother     Cataracts Maternal Aunt     No Known Problems Maternal Uncle     No Known Problems Paternal Aunt     No Known Problems Paternal Uncle     No Known Problems Maternal Grandmother     Diabetes Maternal Grandfather     No Known Problems Paternal Grandmother     No Known Problems Paternal Grandfather     No Known Problems Son     No Known Problems Son     No Known Problems Other     Breast cancer Neg Hx     Colon cancer Neg Hx     Cancer Neg Hx     Stroke Neg Hx     Amblyopia Neg Hx     Blindness Neg Hx     Glaucoma Neg Hx     Macular degeneration Neg Hx     Retinal detachment Neg Hx     Strabismus Neg Hx     Thyroid disease Neg Hx        Tobacco  "Use    Smoking status: Never     Passive exposure: Never    Smokeless tobacco: Never   Substance and Sexual Activity    Alcohol use: No    Drug use: Yes     Types: Hydrocodone    Sexual activity: Not Currently         Physical Exam:     Physical Examination  /83   Pulse 110   Temp 99.6 °F (37.6 °C) (Rectal)   Resp (!) 26   Ht 5' 4" (1.626 m)   Wt 39.9 kg (88 lb)   LMP 10/24/2016   SpO2 95%   BMI 15.11 kg/m²   Body mass index is 15.11 kg/m².    Neurological Exam  Mental Status:   Alert and oriented to name and place.  Unable to tell current president's name.  Recognizes and tells 's name.  Can only name 1/2 sons names.  Unable to follow simple commands.    ?CN: ?  II, III, IV, VI: PERRL, EOMI, no nystagmus, fundus not visualized due to inadequate dilation.?V: intact to fine touch, temperature, pain.?VII: symmetrical facial movement, nice smile, no drooping.?VIII: grossly intact to hearing.?IX, X: symmetrical palate, normal palatal elevation.?XI: 5/5 SCM and 5/5 trapezius.?XII: tongue midline, 5/5, no deviation or fasciculation.?                ?Motor:   5/5 in bilateral upper extremities  4/5 in left lower extremity, 3/5 in right lower extremity     Reflexes:   2+ throughout    Sensation: on both UEs and LEs    ?Light Touch: Normal.    ?Coordination:    Asterixis noted on exam    ??Gait:    Not tested    Significant Labs: BMP:   Recent Labs   Lab 01/02/24  1023   GLU 75      K 5.0   CL 98   CO2 22*   BUN 28*   CREATININE 3.9*   CALCIUM 9.8     CBC:   Recent Labs   Lab 01/02/24  1023   WBC 6.14   HGB 14.0   HCT 49.8*          Significant Imaging:   Following images were personally interpreted:   CT head without contrast:  Motion degraded, left frontal hypodensity, indeterminate age of possible infarct  CTA head and neck     CTA head: Focal calcification along the expected left proximal M2 segment of the MCA concerning for nonocclusive calcified embolus.  No evidence for definite " occlusion with contrast opacification identified beyond the expected calcification.  Otherwise unremarkable CTA head as detailed above  CTA neck: Atherosclerotic plaquing of the carotid bifurcations and proximal ICAs with less than 50% proximal ICA stenosis by NASCET criteria.  CT head: Ill-defined hypoattenuation left insula and inferior frontal lobe somewhat less apparent which may represent evolving recent infarction.  No definite new abnormal parenchymal attenuation or definite abnormal parenchymal enhancement.      Assessment and Plan:   58 y.o. year old female with past medical history of EF of 23%, ESRD on dialysis (TTS), Afib on OAC, AICD placement (currently disconnected per ), RCC with metastatic disease to lung and pancreas, presenting to the ED for altered mental status.  Patient noted to be worsening over 2-3 days from her pretty stable mental baseline, also noted slowing gait using a walker.  On exam, noted to have right lower extremity weakness along with confusion on mental status exam.  CT head concerning for right frontal hypodensity.  There is a correlating chronic M2 nonocclusive embolus on CTA, possible etiology.    Plan:   - unable to obtain MRI brain due to AICD  - per , patient not on aspirin and was stopped recently.  Started on aspirin 81 mg daily  - starting on atorvastatin 40 mg daily(unclear if taking, supposed to be on it)  -Transthoracic ECHO (TTE) to rule out cardiac source given low EF noted previously   -Permissive HTN x 24 hrs post index event / long term < 140/90   -Goal Parameters for TIA/stroke: LDL<70 mg/dl, HbA1C: <7.0 %,  SBP<130/80 (discussed risk factor optimization in order to reduce the risk of future events with help of PCP)  -PT/OT evaluation and therapy goals per their recommendations  -Diet and Exercise: Discussed aggressive lifestyle modification. Eat primarily plant-based foods, such as fruits and vegetables, whole grains, legumes (beans) and nuts.  Discussed Mediterranean diet. Daily exercise (150 minutes per week).  -Provided education regarding future stroke identification: I went over the usual stroke warning signs and symptoms, and the need to activate EMS (call 911) as soon as the symptoms present including-sudden onset numbness or weakness of the face, arm, or leg; especially on one side of the body; sudden confusion, trouble speaking, or understanding; sudden trouble seeing in one or both eyes; sudden trouble walking; dizziness; loss of coordination.  - outpatient vascular neurology follow up        Thank you for your consult. I will sign off. Please contact us if you have any additional questions.    Time spent on this encounter: 70 minutes. This includes face to face time and non-face to face time preparing to see the patient (eg, review of tests), obtaining and/or reviewing separately obtained history, documenting clinical information in the electronic or other health record, independently interpreting results and communicating results to the patient/family/caregiver, or care coordinator.     Morgan Song MD  Neurology  [facility]

## 2024-01-03 NOTE — PLAN OF CARE
Recommendations    Continue regular diet; texture per SLP; consider ONS Boost to help meet EEN/EPN  Continue to monitor weights and labs  Collaboration with medical providers    Goals: 1. Pt to meet % EEN/EPN by RD follow up  Nutrition Goal Status: new  Communication of RD Recs:  (POC)    Assessment and Plan    Nutrition Problem:  Moderate/Severe Protein-Calorie Malnutrition  Malnutrition in the context of Chronic Illness/Injury    Related to (etiology):  BMI 16.14    Signs and Symptoms (as evidenced by):  Energy Intake: <75% of estimated energy requirement for >1 year  Body Fat Depletion: severe depletion of orbitals, triceps, and thoracic and lumbar region   Muscle Mass Depletion: severe depletion of temples, clavicle region, scapular region, interosseous muscle, and lower extremities       Interventions(treatment strategy):  Collaboration with medical providers    Nutrition Diagnosis Status:  New

## 2024-01-03 NOTE — CONSULTS
1904:   Consult was conducted with the patient's sister in law and her medical provider. Medical Provider reported that the patient's responses to questions were not always rational or appropriate.   Consult with the patient found her awake and appeared to be alert. However, when asked about how she was feeling, it appeared as though the patient was asleep... (non-verbal).  The patient's sister in law provided information that the patient and her  reside together. She also stated that basically, her brother would be the caretaker of the patient upon her discharge from the hospital.  Prayers were offered for the patient and her family.  The Spiritual Care Team will continue to provide spiritual support to the patient and her family.        JACOB Story   [phone]

## 2024-01-04 PROBLEM — R32 INCONTINENCE ASSOCIATED DERMATITIS: Status: ACTIVE | Noted: 2024-01-04

## 2024-01-04 PROBLEM — L25.8 INCONTINENCE ASSOCIATED DERMATITIS: Status: ACTIVE | Noted: 2024-01-04

## 2024-01-04 LAB
ANION GAP SERPL CALC-SCNC: 16 MMOL/L (ref 8–16)
BASOPHILS # BLD AUTO: 0.15 K/UL (ref 0–0.2)
BASOPHILS NFR BLD: 2.4 % (ref 0–1.9)
BUN SERPL-MCNC: 45 MG/DL (ref 6–20)
CALCIUM SERPL-MCNC: 10.3 MG/DL (ref 8.7–10.5)
CHLORIDE SERPL-SCNC: 104 MMOL/L (ref 95–110)
CO2 SERPL-SCNC: 18 MMOL/L (ref 23–29)
CREAT SERPL-MCNC: 6.9 MG/DL (ref 0.5–1.4)
DIFFERENTIAL METHOD BLD: ABNORMAL
EOSINOPHIL # BLD AUTO: 0.4 K/UL (ref 0–0.5)
EOSINOPHIL NFR BLD: 6.1 % (ref 0–8)
ERYTHROCYTE [DISTWIDTH] IN BLOOD BY AUTOMATED COUNT: 19.7 % (ref 11.5–14.5)
EST. GFR  (NO RACE VARIABLE): 6 ML/MIN/1.73 M^2
GLUCOSE SERPL-MCNC: 75 MG/DL (ref 70–110)
HCT VFR BLD AUTO: 43.8 % (ref 37–48.5)
HGB BLD-MCNC: 12.6 G/DL (ref 12–16)
IMM GRANULOCYTES # BLD AUTO: 0.03 K/UL (ref 0–0.04)
IMM GRANULOCYTES NFR BLD AUTO: 0.5 % (ref 0–0.5)
LYMPHOCYTES # BLD AUTO: 0.9 K/UL (ref 1–4.8)
LYMPHOCYTES NFR BLD: 14.1 % (ref 18–48)
MCH RBC QN AUTO: 25.3 PG (ref 27–31)
MCHC RBC AUTO-ENTMCNC: 28.8 G/DL (ref 32–36)
MCV RBC AUTO: 88 FL (ref 82–98)
MONOCYTES # BLD AUTO: 0.8 K/UL (ref 0.3–1)
MONOCYTES NFR BLD: 12.9 % (ref 4–15)
NEUTROPHILS # BLD AUTO: 4 K/UL (ref 1.8–7.7)
NEUTROPHILS NFR BLD: 64 % (ref 38–73)
NRBC BLD-RTO: 0 /100 WBC
PLATELET # BLD AUTO: 311 K/UL (ref 150–450)
PMV BLD AUTO: 9.4 FL (ref 9.2–12.9)
POTASSIUM SERPL-SCNC: 4.5 MMOL/L (ref 3.5–5.1)
RBC # BLD AUTO: 4.98 M/UL (ref 4–5.4)
SODIUM SERPL-SCNC: 138 MMOL/L (ref 136–145)
WBC # BLD AUTO: 6.18 K/UL (ref 3.9–12.7)

## 2024-01-04 PROCEDURE — 97530 THERAPEUTIC ACTIVITIES: CPT

## 2024-01-04 PROCEDURE — 36415 COLL VENOUS BLD VENIPUNCTURE: CPT | Performed by: HOSPITALIST

## 2024-01-04 PROCEDURE — 80048 BASIC METABOLIC PNL TOTAL CA: CPT | Performed by: HOSPITALIST

## 2024-01-04 PROCEDURE — 5A1D70Z PERFORMANCE OF URINARY FILTRATION, INTERMITTENT, LESS THAN 6 HOURS PER DAY: ICD-10-PCS | Performed by: HOSPITALIST

## 2024-01-04 PROCEDURE — 97166 OT EVAL MOD COMPLEX 45 MIN: CPT

## 2024-01-04 PROCEDURE — 99223 1ST HOSP IP/OBS HIGH 75: CPT | Mod: GW,HPC,,

## 2024-01-04 PROCEDURE — 11000001 HC ACUTE MED/SURG PRIVATE ROOM

## 2024-01-04 PROCEDURE — 85025 COMPLETE CBC W/AUTO DIFF WBC: CPT | Performed by: HOSPITALIST

## 2024-01-04 PROCEDURE — 97535 SELF CARE MNGMENT TRAINING: CPT

## 2024-01-04 PROCEDURE — 97161 PT EVAL LOW COMPLEX 20 MIN: CPT

## 2024-01-04 PROCEDURE — 25000003 PHARM REV CODE 250: Performed by: INTERNAL MEDICINE

## 2024-01-04 PROCEDURE — 25000003 PHARM REV CODE 250: Performed by: HOSPITALIST

## 2024-01-04 RX ORDER — TALC
6 POWDER (GRAM) TOPICAL NIGHTLY
Status: DISCONTINUED | OUTPATIENT
Start: 2024-01-04 | End: 2024-01-05 | Stop reason: HOSPADM

## 2024-01-04 RX ADMIN — LOPERAMIDE HYDROCHLORIDE 2 MG: 2 CAPSULE ORAL at 08:01

## 2024-01-04 RX ADMIN — ATORVASTATIN CALCIUM 40 MG: 40 TABLET, FILM COATED ORAL at 08:01

## 2024-01-04 RX ADMIN — LEVOTHYROXINE SODIUM 75 MCG: 75 TABLET ORAL at 05:01

## 2024-01-04 RX ADMIN — LOPERAMIDE HYDROCHLORIDE 2 MG: 2 CAPSULE ORAL at 06:01

## 2024-01-04 RX ADMIN — APIXABAN 2.5 MG: 2.5 TABLET, FILM COATED ORAL at 10:01

## 2024-01-04 RX ADMIN — APIXABAN 2.5 MG: 2.5 TABLET, FILM COATED ORAL at 08:01

## 2024-01-04 RX ADMIN — FAMOTIDINE 20 MG: 20 TABLET ORAL at 08:01

## 2024-01-04 RX ADMIN — ASPIRIN 81 MG: 81 TABLET, COATED ORAL at 08:01

## 2024-01-04 RX ADMIN — Medication 6 MG: at 10:01

## 2024-01-04 RX ADMIN — MUPIROCIN: 20 OINTMENT TOPICAL at 10:01

## 2024-01-04 RX ADMIN — MUPIROCIN: 20 OINTMENT TOPICAL at 08:01

## 2024-01-04 NOTE — SUBJECTIVE & OBJECTIVE
Medications:  Continuous Infusions:  Scheduled Meds:   apixaban  2.5 mg Oral BID    aspirin  81 mg Oral Daily    atorvastatin  40 mg Oral Daily    famotidine  20 mg Oral Daily    levothyroxine  75 mcg Oral Before breakfast    mupirocin   Nasal BID     PRN Meds:acetaminophen, albuterol-ipratropium, loperamide    Objective:     Vital Signs (Most Recent):  Temp: 97.7 °F (36.5 °C) (01/04/24 0750)  Pulse: (!) 111 (01/04/24 0750)  Resp: 18 (01/04/24 0750)  BP: 120/87 (01/04/24 0750)  SpO2: 100 % (01/04/24 0750) Vital Signs (24h Range):  Temp:  [97.5 °F (36.4 °C)-98.7 °F (37.1 °C)] 97.7 °F (36.5 °C)  Pulse:  [103-111] 111  Resp:  [18-19] 18  SpO2:  [91 %-100 %] 100 %  BP: (108-134)/(77-87) 120/87     Weight: 42.6 kg (94 lb)  Body mass index is 16.14 kg/m².       Physical Exam  Vitals and nursing note reviewed.   Constitutional:       General: She is awake.      Appearance: She is cachectic.      Comments: Sitting in bedside chair, calm, no signs of discomfort    HENT:      Head: Atraumatic.      Comments: Ranjan temporal wasting      Mouth/Throat:      Mouth: Mucous membranes are dry.   Pulmonary:      Effort: Pulmonary effort is normal. No respiratory distress.   Musculoskeletal:      Comments: Muscular wasting to all extremities    Skin:     Comments: Clean intact dressings to bilateral heels    Neurological:      Mental Status: She is alert.      Comments: Oriented to person and place    Psychiatric:         Behavior: Behavior is cooperative.     Advance Care Planning   Advance Directives:   Living Will: No    [place]: Yes (prior DNR LAPOST signed with Hospice)    Do Not Resuscitate Status: No (previously DNR with current wishes for full code; currently discussing with family with some percieved confusion with DNR vs do not treat;  with good insight that CPR would be harmful to pt at this time and in current state)    Medical Power of : No    Goals of Care: What is most important right now is to focus on  spending time at home, avoiding the hospital, remaining as independent as possible, quality of life, even if it means sacrificing a little time, improvement in condition but with limits to invasive therapies, comfort and QOL . Accordingly, we have decided that the best plan to meet the patient's goals includes continuing with treatment.     Significant Labs: All pertinent labs within the past 24 hours have been reviewed.  CBC:   Recent Labs   Lab 01/04/24 0430   WBC 6.18   HGB 12.6   HCT 43.8   MCV 88        BMP:  Recent Labs   Lab 01/04/24 0430   GLU 75      K 4.5      CO2 18*   BUN 45*   CREATININE 6.9*   CALCIUM 10.3     LFT:  Lab Results   Component Value Date    AST 63 (H) 01/02/2024     (H) 10/12/2006    ALKPHOS 606 (H) 01/02/2024    BILITOT 1.9 (H) 01/02/2024     Albumin:   Albumin   Date Value Ref Range Status   01/02/2024 3.1 (L) 3.5 - 5.2 g/dL Final     Protein:   Total Protein   Date Value Ref Range Status   01/02/2024 9.5 (H) 6.0 - 8.4 g/dL Final     Lactic acid:   Lab Results   Component Value Date    LACTATE 2.9 (H) 01/02/2024    LACTATE 1.6 10/16/2023     Significant Imaging: I have reviewed all pertinent imaging results/findings within the past 24 hours.

## 2024-01-04 NOTE — PT/OT/SLP EVAL
"Physical Therapy Evaluation and Treatment    Patient Name: Eva Bloom   MRN: [id number]  Recent Surgery: * No surgery found *      Recommendations:     Discharge Recommendations: Low Intensity Therapy   Discharge Equipment Recommendations: none       Assessment:     Eva Bloom is a 58 y.o. female admitted with a medical diagnosis of CVA (cerebral vascular accident). She presents with the following impairments/functional limitations: weakness, impaired endurance, impaired functional mobility, gait instability, impaired cognition, impaired balance, impaired self care skills.     Rehab Prognosis: Fair; patient would benefit from acute PT services to address these deficits and reach maximum level of function.    Plan:     During this hospitalization, patient to be seen 3 x/week to address the above listed problems via gait training, therapeutic activities, therapeutic exercises    Plan of Care Expires: 01/11/24    Subjective     Chief Complaint: "I don't want to get in the chair..."  Patient Comments/Goals: To be able to walk again  Pain/Comfort:  Pain Rating 1: 0/10    Social History:  Living Environment: Patient lives with their spouse in a single story home with number of outside stair(s): 1  Prior Level of Function: Prior to admission, patient was modified independent  Equipment Used at Home: walker, rolling, bedside commode, shower chair, wheelchair  DME owned (not currently used): rolling walker  Assistance Upon Discharge:      Objective:     Patient found supine with  (no attachments) upon PT entry to room.    General Precautions: Standard,     Orthopedic Precautions: N/A   Braces: N/A    Respiratory Status: Room air    Exams:  Cognitive Exam: Patient is oriented to Person and Place  RLE ROM: WFL  RLE Strength: WFL, grossly 3/5  LLE ROM: WFL  LLE Strength: WFL, grossly 3/5  Sensation: Intact light touch to BLEs  Coordination: NT  Tone:  normal    Functional Mobility:  Gait belt applied - Yes  Bed " Mobility  Supine to Sit: maximal assistance for LE management and trunk management  Transfers  Sit to Stand: minimum assistance with rolling walker  Bed to Chair: maximal assistance with no AD using Squat Pivot  Gait: NT  Balance:  Static Sitting: stand by assistance   Good: Patient able to maintain balance without handhold support, limited postural sway.  Dynamic Sitting:  NT    Static Standing: minimum assistance with rolling walker  Fair: Patient able to maintain balance with handhold support; may require occasional minimal assistance.  Dynamic Standing:  NT    Therapeutic Activities and Exercises:  Patient educated on role of acute care PT and PT POC.  Pt educated on sitting OOB and encouraged to sit at least an hour.    AM-PAC 6 CLICK MOBILITY  Turning over in bed (including adjusting bedclothes, sheets and blankets)?: 2  Sitting down on and standing up from a chair with arms (e.g., wheelchair, bedside commode, etc.): 2  Moving from lying on back to sitting on the side of the bed?: 2  Moving to and from a bed to a chair (including a wheelchair)?: 2  Need to walk in hospital room?: 1  Climbing 3-5 steps with a railing?: 1  Basic Mobility Total Score: 10     Patient left  reclined in chair  with all lines intact, call button in reach, and  present.    GOALS:   Multidisciplinary Problems       Physical Therapy Goals          Problem: Physical Therapy    Goal Priority Disciplines Outcome Goal Variances Interventions   Physical Therapy Goal     PT, PT/OT Ongoing, Progressing     Description: Goals to be met by: 24     Patient will increase functional independence with mobility by performin. Pt to be min A with bed mobility.  2. Pt to transfer with CGA.  3. Pt to ambulate 50' w/RW CGA.  4. Pt to be able to tolerate 1x10 reps BLE exs seated.                         History:     Past Medical History:   Diagnosis Date    Anemia     Anticoagulant long-term use     Atrial fibrillation     Bronchitis  03/01/2017    Cancer 2016    kidney cancer    CHF (congestive heart failure), NYHA class II, chronic, systolic     CMV (cytomegalovirus) antibody positive     CVA (cerebral vascular accident) 1/3/2024    Encounter for blood transfusion     ESRD (end stage renal disease)     Essential hypertension 09/23/2015    H/O herpes simplex type 2 infection     Herpes simplex type 1 antibody positive     History of kidney cancer     s/p left nephrectomy 1/2016    Hyperparathyroidism, unspecified     Hyperuricemia without signs of inflammatory arthritis and tophaceous disease     Kidney stones     LGSIL (low grade squamous intraepithelial dysplasia)     Myocardiopathy 07/21/2017    Prediabetes     Proteinuria     Renal disorder     Thyroid nodule     Urate nephropathy        Past Surgical History:   Procedure Laterality Date    BREAST CYST EXCISION      COLONOSCOPY N/A 11/12/2015    COLONOSCOPY N/A 3/12/2021    Procedure: COLONOSCOPY;  Surgeon: Brendon Lanier MD;  Location: [facility] ENDO;  Service: Endoscopy;  Laterality: N/A;  covid test 3/9, labs prior, prep instr mailed -ml    EXCISION OF ARTERIOVENOUS FISTULA Left 9/27/2023    Procedure: EXCISION, AV FISTULA;  Surgeon: FARIDEH Muñoz III, MD;  Location: [facility] OR [address] [place] [address];  Service: Vascular;  Laterality: Left;  LUE AV graft excision    INSERTION OF BIVENTRICULAR IMPLANTABLE CARDIOVERTER-DEFIBRILLATOR (ICD)  04/2021    INSERTION OF TUNNELED CENTRAL VENOUS CATHETER (CVC) WITH SUBCUTANEOUS PORT N/A 1/25/2023    Procedure: INSERTION, DUAL LUMEN CATHETER WITH PORT, WITH IMAGING GUIDANCE;  Surgeon: FARIDEH Muñoz III, MD;  [facility];  Service: Peripheral Vascular;  Laterality: N/A;  possinble permcath placment    NEPHRECTOMY-LAPAROSCOPIC Left 01/12/2016    PERCUTANEOUS TRANSLUMINAL ANGIOPLASTY OF ARTERIOVENOUS FISTULA Left 4/19/2023    Procedure: PTA, AV FISTULA;  Surgeon: FARIDEH Muñoz III, MD;  Location: [facility] CATH LAB;  Service: Peripheral Vascular;   Laterality: Left;    PERITONEAL CATHETER INSERTION      Permacath insertion  01/12/2017    PLACEMENT OF ARTERIOVENOUS GRAFT  12/28/2022    Procedure: INSERTION, GRAFT, ARTERIOVENOUS;  Surgeon: FARIDEH Muñoz III, MD;  [facility];  Service: Peripheral Vascular;;    REMOVAL, GRAFT, ARTERIOVENOUS, UPPER EXTREMITY Left 1/25/2023    Procedure: REMOVAL, GRAFT, ARTERIOVENOUS, UPPER EXTREMITY;  Surgeon: FARIDEH Muñoz III, MD;  Location: [facility] OR [address] [place] [address];  Service: Peripheral Vascular;  Laterality: Left;    REVISION OF ARTERIOVENOUS FISTULA Left 5/1/2023    Procedure: REVISION, AV FISTULA;  Surgeon: FARIDEH Muñoz III, MD;  [facility];  Service: Peripheral Vascular;  Laterality: Left;  LUE AVG revision    REVISION OF PROCEDURE INVOLVING ARTERIOVENOUS GRAFT Left 12/28/2022    Procedure: REVISION, PROCEDURE INVOLVING ARTERIOVENOUS GRAFT;  Surgeon: FARIDEH Muñoz III, MD;  Location: [facility] OR [address] [place] [address];  Service: Peripheral Vascular;  Laterality: Left;    REVISION OF PROCEDURE INVOLVING ARTERIOVENOUS GRAFT Left 7/21/2023    Procedure: LEFT ARM ARTERIOVENOUS GRAFT EXCISION  AND LIGATION;  Surgeon: Obi Werner MD;  Location: [facility];  Service: Vascular;  Laterality: Left;  Left AVG excision and revision with interposition    SALPINGOOPHORECTOMY Right 2016    KJB---DAVINCI    TONSILLECTOMY      TUBAL LIGATION         Time Tracking:     PT Received On: 01/04/24  PT Start Time: 0950  PT Stop Time: 1013  PT Total Time (min): 23 min     Billable Minutes: Evaluation 15 and Therapeutic Activity 8    1/4/2024

## 2024-01-04 NOTE — ASSESSMENT & PLAN NOTE
- chronic condition noted  - pt with AICD in place   - 5/23 is most recent echo on file with EF 23% at that time and Grade II left ventricular diastolic dysfunction, and pulm HTN   - contributes to overall poor condition/prognosis and contributes to pt's appropriateness for hospice but does not align with GOC at this time   - continued management per hospital primary

## 2024-01-04 NOTE — PLAN OF CARE
Problem: Occupational Therapy  Goal: Occupational Therapy Goal  Description: Goals to be met by: 01/11/24     Patient will increase functional independence with ADLs by performing:    UE Dressing with Supervision.  LE Dressing with Supervision.  Grooming while seated at sink with Supervision.  Toileting from bedside commode with Supervision for hygiene and clothing management.   Sitting in chair  x30-60 minutes with Supervision.  Toilet transfer to bedside commode with Supervision.  Upper extremity exercise program x10  reps per handout, with assistance as needed.    Outcome: Ongoing, Progressing   Patient recommended for low intensive therapy, occupational therapy to see 3x/wk .

## 2024-01-04 NOTE — ASSESSMENT & PLAN NOTE
1/5/2023  - interval chart reviewed; discussed pt with MDT; plan for discharge home today with HH and continued HD   - met with pt at bedside;pt sitting in bedside chair, calm, and much improved mental status; oriented to person and place, provided 's name, confirms remembering me from prior admits; excited to be going home today   - family not at bedside; call  Otis by phone; discussed recommendation for continued palliative involvement outside of acute hospital setting, especially for continued GOC/ACP and code status discussion involving all family members outside the increased stress of hospital admission; Otis feels this is a great plan and is open to transitioning to [facility]'s palliative program as they were under their hospice program before; will also allow for continuity of care if Hospice aligns with GOC in the future   - emotional support provided; allowed time for questions/concerns all addressed   - updated Isabelle ROBB RN of this plan and placed order for referral     1/4/2024 - pt at HD and no family at bedside during time of visit.  Have not yet heard back from pt's sister in law.     1/3/2024 - Consult   - consult received; interval chart reviewed in detail; discussed pt with hospital primary, Dr. Ramires   - pt known to myself and palliative medicine team from previous admission; pt previously on hospice with [facility] and revoked 12/15 for wishes of aggressive treatment in regards to HD   - met with patient and  Otis at bedside; introduction to palliative medicine team and role in current care and admission; pt not able to participate in discussions due to AMS   - learned more about pt and ongoing care since prior admission;  reports pt has been doing overall well/better since prior admission until recently; continues to tolerate HD with no issues; but started experiencing decline/AMS prior to admission which prompted ED visit   -  explained hospice d/c  "due to wishing to continue with HD as pt is tolerating well; he stated that if pt were to ever not tolerate HD he would be more agreeable to stopping HD, but wishes for her to continue it at this time;  he has good insight that death would be imminent if HD were to be stopped   - GOC/ACP discussion; focus of discussion on current full code status, as pt was previously DNR last 2 admissions and completed DNR LAPOST with hospice  - in depth discussion about pt's current health status and end stage nature of multiple of pt's conditions ; explained difference between DNR and do not treat as this was a prior concern for pt,  and sister in law Karis in the past; strong recommendation for DNR as pt would not tolerate CPR/intubation well, nor would it solve any of pt's multiple end stage conditions;  seems to have good insight into this (like he did in prior admission when pt was DNR),  even grimacing and moving hands to face when discussing potential for CPR, stating "it would crush her"; I supported these thoughts and feelings  - Otis asked that I call his sister/pt's sister in law, Karis, to discuss with her as well as he was not comfortable changing code status without her input as she is an RN; I recall Karis advocating for full code in prior admission as well; I attempted to call Karis following visit, she requested to call my back at a later time as she was working on a report   - plan for continue discussion of recommendation for DNR and encouragement that pt can still receive full treatment with DNR code status   - hospital  consulted and following pt/family   - emotional support provided   - Allowed time for questions/concerns; all addressed; expressed availability of myself/palliative team for additional questions/concerns     "

## 2024-01-04 NOTE — ASSESSMENT & PLAN NOTE
- chronic history noted   - sees Dr. Rivera ([facility]) as OP with recent 11/23 follow up post discharge last admission; no longer candidate for any type of onc treatment;  with good insight into this although he shares difficulty accepting the information   - likely contributing factor to CVA/future CVA risk which was dicussed with  (see CVA)   - also contributes to appropriateness for hospice, but does not align with GOC   - continued management per hospital primary

## 2024-01-04 NOTE — PLAN OF CARE
Problem: Physical Therapy  Goal: Physical Therapy Goal  Description: Goals to be met by: 24     Patient will increase functional independence with mobility by performin. Pt to be min A with bed mobility.  2. Pt to transfer with CGA.  3. Pt to ambulate 50' w/RW CGA.  4. Pt to be able to tolerate 1x10 reps BLE exs seated.    Outcome: Ongoing, Progressing   Initial eval completed, see in chart for details.

## 2024-01-04 NOTE — PLAN OF CARE
Case Management Assessment     PCP: Sowmya Mccain MD    Pharmacy: [facility] [place] [facility]    Patient Arrived From: home  Existing Help at Home: Otis Bloom (Spouse)   [phone] (Mobil     Barriers to Discharge: Confusion    Discharge Plan:    A. Home with spouse   B. Home with spouse and home health              C.  Home with hospice      SpouseOtis at the bedside.  Patient appeared a little confused about DME at  home.  Otis said he notice she was acting different prior to HD but he did not want her to miss it and brought her to the hospital afterwards. She goes to HD [facility] [phone].  Otis says that the patient was discharged from hospice in the last month or so.    TN will continue to assess for discharge needs.     01/03/24 1005   Discharge Assessment   Assessment Type Discharge Planning Assessment   Confirmed/corrected address, phone number and insurance Yes   Confirmed Demographics Correct on Facesheet   Source of Information family;patient   Communicated MAVIS with patient/caregiver Date not available/Unable to determine   Reason For Admission Confusion   People in Home spouse   Do you expect to return to your current living situation? Yes   Do you have help at home or someone to help you manage your care at home? Yes   Who are your caregiver(s) and their phone number(s)? Otis Bloom (Spouse) [phone] (Mobile)   Prior to hospitilization cognitive status: Alert/Oriented   Current cognitive status: Alert/Oriented   Walking or Climbing Stairs Difficulty yes   Walking or Climbing Stairs ambulation difficulty, requires equipment   Mobility Management rolling walker   Dressing/Bathing Difficulty yes   Equipment Currently Used at Home walker, rolling   Readmission within 30 days? No   Patient currently being followed by outpatient case management? Unable to determine (comments)   Do you currently have service(s) that help you manage your care at home? No   Do you take  prescription medications? Yes   Do you have prescription coverage? Yes   Coverage MEDICARE - MEDICARE PART A & B -   Do you take coumadin? No   Discharge Plan A Home with family   Discharge Plan B Home with family;Home Health   DME Needed Upon Discharge  none   OTHER   Name(s) of People in Home Otis Bloom (Spouse) [phone] (Mobil

## 2024-01-04 NOTE — SUBJECTIVE & OBJECTIVE
Past Medical History:   Diagnosis Date    Anemia     Anticoagulant long-term use     Atrial fibrillation     Bronchitis 03/01/2017    Cancer 2016    kidney cancer    CHF (congestive heart failure), NYHA class II, chronic, systolic     CMV (cytomegalovirus) antibody positive     CVA (cerebral vascular accident) 1/3/2024    Encounter for blood transfusion     ESRD (end stage renal disease)     Essential hypertension 09/23/2015    H/O herpes simplex type 2 infection     Herpes simplex type 1 antibody positive     History of kidney cancer     s/p left nephrectomy 1/2016    Hyperparathyroidism, unspecified     Hyperuricemia without signs of inflammatory arthritis and tophaceous disease     Kidney stones     LGSIL (low grade squamous intraepithelial dysplasia)     Myocardiopathy 07/21/2017    Prediabetes     Proteinuria     Renal disorder     Thyroid nodule     Urate nephropathy        Past Surgical History:   Procedure Laterality Date    BREAST CYST EXCISION      COLONOSCOPY N/A 11/12/2015    COLONOSCOPY N/A 3/12/2021    Procedure: COLONOSCOPY;  Surgeon: Brendon Lanier MD;  Location: [facility];  Service: Endoscopy;  Laterality: N/A;  covid test 3/9, labs prior, prep instr mailed -ml    EXCISION OF ARTERIOVENOUS FISTULA Left 9/27/2023    Procedure: EXCISION, AV FISTULA;  Surgeon: FARIDEH Muñoz III, MD;  Location: [facility] OR [address] [place] [address];  Service: Vascular;  Laterality: Left;  LUE AV graft excision    INSERTION OF BIVENTRICULAR IMPLANTABLE CARDIOVERTER-DEFIBRILLATOR (ICD)  04/2021    INSERTION OF TUNNELED CENTRAL VENOUS CATHETER (CVC) WITH SUBCUTANEOUS PORT N/A 1/25/2023    Procedure: INSERTION, DUAL LUMEN CATHETER WITH PORT, WITH IMAGING GUIDANCE;  Surgeon: FARIDEH Muñoz III, MD;  [facility];  Service: Peripheral Vascular;  Laterality: N/A;  possinble permcath placment    NEPHRECTOMY-LAPAROSCOPIC Left 01/12/2016    PERCUTANEOUS TRANSLUMINAL ANGIOPLASTY OF ARTERIOVENOUS FISTULA Left 4/19/2023     Procedure: PTA, AV FISTULA;  Surgeon: FARIDEH Muñoz III, MD;  Location: [facility] CATH LAB;  Service: Peripheral Vascular;  Laterality: Left;    PERITONEAL CATHETER INSERTION      Permacath insertion  01/12/2017    PLACEMENT OF ARTERIOVENOUS GRAFT  12/28/2022    Procedure: INSERTION, GRAFT, ARTERIOVENOUS;  Surgeon: FARIDEH Muñoz III, MD;  Location: [facility] OR [facility] FLR;  Service: Peripheral Vascular;;    REMOVAL, GRAFT, ARTERIOVENOUS, UPPER EXTREMITY Left 1/25/2023    Procedure: REMOVAL, GRAFT, ARTERIOVENOUS, UPPER EXTREMITY;  Surgeon: FARIDEH Muñoz III, MD;  [facility];  Service: Peripheral Vascular;  Laterality: Left;    REVISION OF ARTERIOVENOUS FISTULA Left 5/1/2023    Procedure: REVISION, AV FISTULA;  Surgeon: FARIDEH Muñoz III, MD;  [facility];  Service: Peripheral Vascular;  Laterality: Left;  LUE AVG revision    REVISION OF PROCEDURE INVOLVING ARTERIOVENOUS GRAFT Left 12/28/2022    Procedure: REVISION, PROCEDURE INVOLVING ARTERIOVENOUS GRAFT;  Surgeon: FARIDEH Muñoz III, MD;  [facility];  Service: Peripheral Vascular;  Laterality: Left;    REVISION OF PROCEDURE INVOLVING ARTERIOVENOUS GRAFT Left 7/21/2023    Procedure: LEFT ARM ARTERIOVENOUS GRAFT EXCISION  AND LIGATION;  Surgeon: Obi Werner MD;  Location[facility];  Service: Vascular;  Laterality: Left;  Left AVG excision and revision with interposition    SALPINGOOPHORECTOMY Right 2016    KJB---DAVINCI    TONSILLECTOMY      TUBAL LIGATION         Review of patient's allergies indicates:   Allergen Reactions    Coreg [carvedilol] Other (See Comments)     Nausea/vomiting    Allopurinol      Other reaction(s): abnormal transaminases       Current Facility-Administered Medications on File Prior to Encounter   Medication    0.9%  NaCl infusion     Current Outpatient Medications on File Prior to Encounter   Medication Sig    acetaminophen (TYLENOL) 500 MG tablet Take 500 mg by mouth every 6 (six) hours  as needed for Pain.    albuterol-ipratropium (DUO-NEB) 2.5 mg-0.5 mg/3 mL nebulizer solution Take by nebulization.    apixaban (ELIQUIS) 2.5 mg Tab Take 1 tablet (2.5 mg total) by mouth 2 (two) times daily.    bisacodyL (DULCOLAX) 10 mg Supp Place 10 mg rectally.    cabozantinib (CABOMETYX) 60 mg Tab TAKE ONE TABLET BY MOUTH ONCE DAILY AT THE SAME TIME ON AN EMPTY STOMACH AT LEAST 1 HOUR BEFORE OR 2 HOURS AFTER EATING. AVOID GRAPEFRUIT PRODUCTS    cloNIDine 0.3 mg/24 hr td ptwk (CATAPRES) 0.3 mg/24 hr Place 1 patch onto the skin every 7 days.    cyclobenzaprine (FLEXERIL) 5 MG tablet TAKE 1 TABLET BY MOUTH DAILY AS NEEDED FOR MUSCLE SPASMS.    ENTRESTO 49-51 mg per tablet TAKE 1 TABLET BY MOUTH TWICE A DAY    epoetin david-epbx (RETACRIT INJ) Epoetin david - epbx (Retacrit)    fluticasone propionate (FLONASE) 50 mcg/actuation nasal spray 1 spray (50 mcg total) by Each Nostril route 2 (two) times daily as needed (ear fullness).    hyoscyamine (LEVSIN/SL) 0.125 mg Subl Place under the tongue.    lanthanum (FOSRENOL) 1000 MG chewable tablet Take 1 tablet by mouth.    levothyroxine (SYNTHROID) 75 MCG tablet Take 1 tablet (75 mcg total) by mouth once daily.    lidocaine-prilocaine (EMLA) cream APPLY ATLEAST 30 MINUTES BEFORE TREATMENT 3 TIMES A WEEK    LORAZEPAM INTENSOL 2 mg/mL Conc Take by mouth.    metoprolol succinate (TOPROL-XL) 100 MG 24 hr tablet Take 1/2 tablet (50mg) once daily    mirtazapine (REMERON) 15 MG tablet Take 15 mg by mouth every evening.    morphine 100 mg/5 mL (20 mg/mL) concentrated solution Take by mouth.    mv,Ca,min-folic acid-vit K1 (ONE-A-DAY WOMEN'S 50 PLUS) 400-20 mcg Tab Take 1 tablet by mouth once daily.    naloxone (NARCAN) 1 mg/mL injection 2 mg (1 mg per nostril) by Nasal route as needed for opioid overdose; may repeat in 3 to 5 minutes if not effective. Call 911    nystatin (MYCOSTATIN) cream SMARTSIG:sparingly Topical 2-4 Times Daily PRN    ondansetron (ZOFRAN-ODT) 4 MG TbDL Take by  mouth.    pantoprazole (PROTONIX) 40 MG tablet Take 1 tablet (40 mg total) by mouth 2 (two) times daily.    traMADoL (ULTRAM) 50 mg tablet Take 1 tablet (50 mg total) by mouth every 12 (twelve) hours as needed for Pain.    [DISCONTINUED] amLODIPine (NORVASC) 5 MG tablet TAKE 1 TABLET BY MOUTH EVERY DAY     Family History       Problem Relation (Age of Onset)    Cataracts Maternal Aunt    Diabetes Father, Maternal Grandfather    Heart disease Sister    Hypertension Mother    Kidney disease Father    No Known Problems Sister, Sister, Brother, Brother, Maternal Uncle, Paternal Aunt, Paternal Uncle, Maternal Grandmother, Paternal Grandmother, Paternal Grandfather, Son, Son, Other          Tobacco Use    Smoking status: Never     Passive exposure: Never    Smokeless tobacco: Never   Substance and Sexual Activity    Alcohol use: No    Drug use: Yes     Types: Hydrocodone    Sexual activity: Not Currently     Review of Systems   Constitutional:  Positive for activity change and appetite change.   HENT:  Negative for congestion and dental problem.    Eyes:  Negative for discharge and itching.   Respiratory:  Negative for apnea and chest tightness.    Cardiovascular:  Negative for chest pain and leg swelling.   Gastrointestinal:  Negative for abdominal distention.   Endocrine: Negative for cold intolerance and heat intolerance.   Genitourinary:  Negative for difficulty urinating and dyspareunia.   Musculoskeletal:  Negative for arthralgias and back pain.   Skin:  Negative for color change and pallor.   Allergic/Immunologic: Negative for environmental allergies and food allergies.   Neurological:  Positive for weakness. Negative for dizziness and facial asymmetry.   Hematological:  Negative for adenopathy. Does not bruise/bleed easily.   Psychiatric/Behavioral:  Positive for confusion.      Objective:     Vital Signs (Most Recent):  Temp: 97.7 °F (36.5 °C) (01/04/24 0750)  Pulse: (!) 111 (01/04/24 0750)  Resp: 18 (01/04/24  0750)  BP: 120/87 (01/04/24 0750)  SpO2: 100 % (01/04/24 0750) Vital Signs (24h Range):  Temp:  [97.5 °F (36.4 °C)-98.7 °F (37.1 °C)] 97.7 °F (36.5 °C)  Pulse:  [103-111] 111  Resp:  [18-19] 18  SpO2:  [91 %-100 %] 100 %  BP: (108-134)/(77-87) 120/87     Weight: 42.6 kg (94 lb)  Body mass index is 16.14 kg/m².     Physical Exam  HENT:      Head: Normocephalic.      Nose: Nose normal.      Mouth/Throat:      Mouth: Mucous membranes are dry.   Eyes:      Extraocular Movements: Extraocular movements intact.      Pupils: Pupils are equal, round, and reactive to light.   Cardiovascular:      Rate and Rhythm: Tachycardia present. Rhythm irregular.      Heart sounds: No murmur heard.  Pulmonary:      Effort: No respiratory distress.   Abdominal:      General: There is no distension.   Musculoskeletal:         General: No swelling or deformity.   Skin:     Coloration: Skin is not jaundiced.      Findings: No bruising.   Neurological:      Mental Status: She is alert.      Comments: Not able complete duo to confusion.   Psychiatric:      Comments: Confused,              CRANIAL NERVES     CN III, IV, VI   Pupils are equal, round, and reactive to light.       Significant Labs: All pertinent labs within the past 24 hours have been reviewed.  BMP:   Recent Labs   Lab 01/04/24  0430   GLU 75      K 4.5      CO2 18*   BUN 45*   CREATININE 6.9*   CALCIUM 10.3       CBC:   Recent Labs   Lab 01/03/24  0917 01/04/24  0430   WBC 5.76 6.18   HGB 13.3 12.6   HCT 47.0 43.8    311       CMP:   Recent Labs   Lab 01/03/24  0917 01/04/24  0430    138   K 4.4 4.5    104   CO2 23 18*   GLU 73 75   BUN 39* 45*   CREATININE 5.3* 6.9*   CALCIUM 10.9* 10.3   ANIONGAP 13 16         Significant Imaging: I have reviewed all pertinent imaging results/findings within the past 24 hours.

## 2024-01-04 NOTE — PROGRESS NOTES
Sleepy after hd earlier    Family at bedside case discussed, hospice is being considered      Denies CNS ENT CP GI  RHEUM OR DERM SX  Past Medical History:   Diagnosis Date    Anemia     Anticoagulant long-term use     Atrial fibrillation     Bronchitis 03/01/2017    Cancer 2016    kidney cancer    CHF (congestive heart failure), NYHA class II, chronic, systolic     CMV (cytomegalovirus) antibody positive     CVA (cerebral vascular accident) 1/3/2024    Encounter for blood transfusion     ESRD (end stage renal disease)     Essential hypertension 09/23/2015    H/O herpes simplex type 2 infection     Herpes simplex type 1 antibody positive     History of kidney cancer     s/p left nephrectomy 1/2016    Hyperparathyroidism, unspecified     Hyperuricemia without signs of inflammatory arthritis and tophaceous disease     Kidney stones     LGSIL (low grade squamous intraepithelial dysplasia)     Myocardiopathy 07/21/2017    Prediabetes     Proteinuria     Renal disorder     Thyroid nodule     Urate nephropathy      Past Surgical History:   Procedure Laterality Date    BREAST CYST EXCISION      COLONOSCOPY N/A 11/12/2015    COLONOSCOPY N/A 3/12/2021    Procedure: COLONOSCOPY;  Surgeon: Brendon Lanier MD;  Location: [facility];  Service: Endoscopy;  Laterality: N/A;  covid test 3/9, labs prior, prep instr mailed -ml    EXCISION OF ARTERIOVENOUS FISTULA Left 9/27/2023    Procedure: EXCISION, AV FISTULA;  Surgeon: FARIDEH Muñoz III, MD;  Location: [facility] OR [address] [place] [address];  Service: Vascular;  Laterality: Left;  LUE AV graft excision    INSERTION OF BIVENTRICULAR IMPLANTABLE CARDIOVERTER-DEFIBRILLATOR (ICD)  04/2021    INSERTION OF TUNNELED CENTRAL VENOUS CATHETER (CVC) WITH SUBCUTANEOUS PORT N/A 1/25/2023    Procedure: INSERTION, DUAL LUMEN CATHETER WITH PORT, WITH IMAGING GUIDANCE;  Surgeon: FARIDEH Muñoz III, MD;  Location: [facility] OR [address] [place] [address];  Service: Peripheral Vascular;  Laterality: N/A;  possinble permcath placment     NEPHRECTOMY-LAPAROSCOPIC Left 01/12/2016    PERCUTANEOUS TRANSLUMINAL ANGIOPLASTY OF ARTERIOVENOUS FISTULA Left 4/19/2023    Procedure: PTA, AV FISTULA;  Surgeon: FARIDEH Muñoz III, MD;  Location: [facility] C[facility];  Service: Peripheral Vascular;  Laterality: Left;    PERITONEAL CATHETER INSERTION      Permacath insertion  01/12/2017    PLACEMENT OF ARTERIOVENOUS GRAFT  12/28/2022    Procedure: INSERTION, GRAFT, ARTERIOVENOUS;  Surgeon: FARIDEH Muñoz III, MD;  Location: [facility] OR [facility];  Service: Peripheral Vascular;;    REMOVAL, GRAFT, ARTERIOVENOUS, UPPER EXTREMITY Left 1/25/2023    Procedure: REMOVAL, GRAFT, ARTERIOVENOUS, UPPER EXTREMITY;  Surgeon: FARIDEH Muñoz III, MD;  Location: [facility] OR [facility];  Service: Peripheral Vascular;  Laterality: Left;    REVISION OF ARTERIOVENOUS FISTULA Left 5/1/2023    Procedure: REVISION, AV FISTULA;  Surgeon: FARIDEH Muñoz III, MD;  [facility];  Service: Peripheral Vascular;  Laterality: Left;  LUE AVG revision    REVISION OF PROCEDURE INVOLVING ARTERIOVENOUS GRAFT Left 12/28/2022    Procedure: REVISION, PROCEDURE INVOLVING ARTERIOVENOUS GRAFT;  Surgeon: FARIDEH Muñoz III, MD;  [facility];  Service: Peripheral Vascular;  Laterality: Left;    REVISION OF PROCEDURE INVOLVING ARTERIOVENOUS GRAFT Left 7/21/2023    Procedure: LEFT ARM ARTERIOVENOUS GRAFT EXCISION  AND LIGATION;  Surgeon: Obi Werner MD;  Location: [facility];  Service: Vascular;  Laterality: Left;  Left AVG excision and revision with interposition    SALPINGOOPHORECTOMY Right 2016    KJB---DAVINCI    TONSILLECTOMY      TUBAL LIGATION       Review of patient's allergies indicates:   Allergen Reactions    Coreg [carvedilol] Other (See Comments)     Nausea/vomiting    Allopurinol      Other reaction(s): abnormal transaminases       Current Facility-Administered Medications   Medication    acetaminophen tablet 650 mg    albuterol-ipratropium 2.5 mg-0.5 mg/3 mL nebulizer  solution 3 mL    apixaban tablet 2.5 mg    aspirin EC tablet 81 mg    atorvastatin tablet 40 mg    famotidine tablet 20 mg    levothyroxine tablet 75 mcg    loperamide capsule 2 mg    melatonin tablet 6 mg    mupirocin 2 % ointment     Facility-Administered Medications Ordered in Other Encounters   Medication    0.9%  NaCl infusion       LABS    Recent Results (from the past 24 hour(s))   CBC Auto Differential    Collection Time: 01/04/24  4:30 AM   Result Value Ref Range    WBC 6.18 3.90 - 12.70 K/uL    RBC 4.98 4.00 - 5.40 M/uL    Hemoglobin 12.6 12.0 - 16.0 g/dL    Hematocrit 43.8 37.0 - 48.5 %    MCV 88 82 - 98 fL    MCH 25.3 (L) 27.0 - 31.0 pg    MCHC 28.8 (L) 32.0 - 36.0 g/dL    RDW 19.7 (H) 11.5 - 14.5 %    Platelets 311 150 - 450 K/uL    MPV 9.4 9.2 - 12.9 fL    Immature Granulocytes 0.5 0.0 - 0.5 %    Gran # (ANC) 4.0 1.8 - 7.7 K/uL    Immature Grans (Abs) 0.03 0.00 - 0.04 K/uL    Lymph # 0.9 (L) 1.0 - 4.8 K/uL    Mono # 0.8 0.3 - 1.0 K/uL    Eos # 0.4 0.0 - 0.5 K/uL    Baso # 0.15 0.00 - 0.20 K/uL    nRBC 0 0 /100 WBC    Gran % 64.0 38.0 - 73.0 %    Lymph % 14.1 (L) 18.0 - 48.0 %    Mono % 12.9 4.0 - 15.0 %    Eosinophil % 6.1 0.0 - 8.0 %    Basophil % 2.4 (H) 0.0 - 1.9 %    Differential Method Automated    Basic Metabolic Panel    Collection Time: 01/04/24  4:30 AM   Result Value Ref Range    Sodium 138 136 - 145 mmol/L    Potassium 4.5 3.5 - 5.1 mmol/L    Chloride 104 95 - 110 mmol/L    CO2 18 (L) 23 - 29 mmol/L    Glucose 75 70 - 110 mg/dL    BUN 45 (H) 6 - 20 mg/dL    Creatinine 6.9 (H) 0.5 - 1.4 mg/dL    Calcium 10.3 8.7 - 10.5 mg/dL    Anion Gap 16 8 - 16 mmol/L    eGFR 6 (A) >60 mL/min/1.73 m^2   ]    I/O last 3 completed shifts:  In: 901.8 [P.O.:720; IV Piggyback:181.8]  Out: 1 [Stool:1]    Vitals:    01/04/24 1300 01/04/24 1330 01/04/24 1400 01/04/24 1449   BP: 129/83 114/86 118/79 126/86   Pulse: 102 105 100 104   Resp: 14 16 16 18   Temp:    97.4 °F (36.3 °C)   TempSrc:    Axillary   SpO2:     (!) 94%   Weight:       Height:           No Jvd, Thyromegaly or Lymphadenopathy  Lungs: Fairly clear anteriorly and laterally  Cor: RRR no G or rubs  Abd: Soft benign good bowel sounds non tender  Ext: No E C C    A)    ESRD  Htn  Anemia   met renal ca   AMS   ? New CVA vs mets was on hospice   2nd hyperpth         P)     Renal Diet  Home meds  Protect access  HD  EPO   Binders  Adjust all meds to the degree of renal fx  Close follow up I/O and weights  Maintain Hydration   HD

## 2024-01-04 NOTE — PT/OT/SLP PROGRESS
Speech Language Pathology      Eva Bloom  MRN: [id number]    Patient not seen today secondary to dialysis. Will follow-up tomorrow.

## 2024-01-04 NOTE — PT/OT/SLP EVAL
Occupational Therapy Evaluation     Name: Eva Bloom  MRN: [id number]  Admitting Diagnosis: CVA (cerebral vascular accident)  Recent Surgery: * No surgery found *      Recommendations:     Discharge Recommendations: Low Intensity Therapy  Level of Assistance Recommended: 24 hours significant assistance  Discharge Equipment Recommendations: none  Barriers to discharge: None    Assessment:     Eva Bloom is a 58 y.o. female with a medical diagnosis of CVA (cerebral vascular accident). She presents with performance deficits affecting function including impaired endurance, weakness, gait instability, impaired balance, impaired cognition, decreased lower extremity function, decreased safety awareness, impaired self care skills, impaired functional mobility, pain. Patient with HOB elevated and not oriented to year or day of month or week, but knew correct month and birth date.     Rehab Prognosis: Fair; patient would benefit from acute OT services to address these deficits and reach maximum level of function.      Plan:     Patient to be seen 3 x/week to address the above listed problems via self-care/home management, therapeutic activities, therapeutic exercises, neuromuscular re-education  Plan of Care Expires: 01/11/24  Plan of Care Reviewed with: patient, spouse    Subjective     Chief Complaint: left foot pain when removing sock   Patient Comments/Goals: Her  was present assisting with history   Pain/Comfort:  Pain Rating 1: other (see comments) (she did not rate, but her L foot was painful to touch when trying to remove sock)    Patients cultural, spiritual, Methodist conflicts given the current situation: no    Social History:  Living Environment: Patient lives with their spouse in a single story home with number of outside stair(s): 1 with no HR   Prior Level of Function: Prior to admission, patient requires assistance with ADLs including basic self care due to cancer and dialysis .  Roles and  Routines: Patient was not driving and not working prior to admission.  Equipment Used at Home: walker, rolling, bedside commode, shower chair, wheelchair  DME owned (not currently used): none  Assistance Upon Discharge: significant other    Objective:     Communicated with RNMena, prior to session. Patient found HOB elevated with  (no telemetry) upon OT entry to room.    General Precautions: Standard, fall   Orthopedic Precautions: N/A   Braces: N/A    Respiratory Status: Room air    Cognitive and Psychosocial Function:  Oriented to: Person and birth date, and month only   Follows Commands/Attention: attentive, easily distracted by pain and fatigue, and follows two-step commands  Communication: clear/fluent and delayed   Memory: Impaired STM and Poor immediate recall  Safety Awareness/Insight to Disability: impaired  Mood/Affect/Coping Skills/Emotional Control: Lethargic     Hearing: Intact    Vision: No deficits noted      Physical Exam:  Postural examination/scapula alignment:    -       Rounded shoulders  -       Forward head  Skin integrity: Visible skin intact  Hand dominance: Right        Left UE Right UE   UE Edema None noted None noted   UE ROM AROM WFL AROM WFL   UE Strength 3/5 3/5    Strength grasp WFL grasp WFL   Sensation LUE  INTACT:  WFL RUE  INTACT:  WFL   Fine Motor Coordination:  LUE  INTACT:  WFL RUE  INTACT:    WFL   Gross Motor Coordination: LUE  INTACT:  WFL RUE  INTACT:  WFL     Occupational Performance    Gait belt applied - Yes    Bed Mobility:   Rolling/Turning to Left with maximal assistance  Scooting to HOB in supine: maximal assistance x 2   Scooting anteriorly to EOB to have both feet planted on floor: maximal assistance  Supine to sit from left side of bed with maximal assistance   Sit to Supine with maximal assistance on left side of bed    Functional Mobility/Transfers  Static Sitting EOB: supervision  Dynamic Sitting EOB: supervision  Static Standing Balance: minimum  assistance  Dynamic Standing Balance: minimum assistance  Sit <> Stand Transfer with minimum assistance with rolling walker  Bed <> Chair Transfer using Step Transfer technique with maximal assistance with hand-held assist  Functionality Mobility: Patient took 2-3     Activities of Daily Living:  Upper Body Dressing: minimum assistance to don outer gown while seated EOB   Lower Body Dressing: total assistance to don socks seated EOB; would not allow occupational therapy to remove sock to see old wound because too painful     AMPAC 6 Click ADL:  AMPAC Total Score: 12    Treatment & Education:  Patient educated on role of OT, POC, and goals for therapy.  Patient educated on importance of OOB activities with staff member assistance and sitting OOB majority of the day.  Occupational therapy did visual assessment with no new deficits such as visual tracking, saccades, blurriness and double vision noted.   Occupational therapy educated patient and spouse re: assessment for coordination and patient did not have any coordination deficits and has not been dropping anything per her 's report.     Patient clear to stand pivot transfer with RN/PCT, assist x1-2 .    Patient left up in chair with all lines intact, call button in reach, RN notified, and spouse present.    GOALS:   Multidisciplinary Problems       Occupational Therapy Goals          Problem: Occupational Therapy    Goal Priority Disciplines Outcome Interventions   Occupational Therapy Goal     OT, PT/OT Ongoing, Progressing    Description: Goals to be met by: 01/11/24     Patient will increase functional independence with ADLs by performing:    UE Dressing with Supervision.  LE Dressing with Supervision.  Grooming while seated at sink with Supervision.  Toileting from bedside commode with Supervision for hygiene and clothing management.   Sitting in chair  x30-60 minutes with Supervision.  Toilet transfer to bedside commode with Supervision.  Upper extremity  exercise program x10  reps per handout, with assistance as needed.                         History:     Past Medical History:   Diagnosis Date    Anemia     Anticoagulant long-term use     Atrial fibrillation     Bronchitis 03/01/2017    Cancer 2016    kidney cancer    CHF (congestive heart failure), NYHA class II, chronic, systolic     CMV (cytomegalovirus) antibody positive     CVA (cerebral vascular accident) 1/3/2024    Encounter for blood transfusion     ESRD (end stage renal disease)     Essential hypertension 09/23/2015    H/O herpes simplex type 2 infection     Herpes simplex type 1 antibody positive     History of kidney cancer     s/p left nephrectomy 1/2016    Hyperparathyroidism, unspecified     Hyperuricemia without signs of inflammatory arthritis and tophaceous disease     Kidney stones     LGSIL (low grade squamous intraepithelial dysplasia)     Myocardiopathy 07/21/2017    Prediabetes     Proteinuria     Renal disorder     Thyroid nodule     Urate nephropathy          Past Surgical History:   Procedure Laterality Date    BREAST CYST EXCISION      COLONOSCOPY N/A 11/12/2015    COLONOSCOPY N/A 3/12/2021    Procedure: COLONOSCOPY;  Surgeon: Brendon Lanier MD;  Location: [facility];  Service: Endoscopy;  Laterality: N/A;  covid test 3/9, labs prior, prep instr mailed -ml    EXCISION OF ARTERIOVENOUS FISTULA Left 9/27/2023    Procedure: EXCISION, AV FISTULA;  Surgeon: FARIDEH Muñoz III, MD;  [facility];  Service: Vascular;  Laterality: Left;  LUE AV graft excision    INSERTION OF BIVENTRICULAR IMPLANTABLE CARDIOVERTER-DEFIBRILLATOR (ICD)  04/2021    INSERTION OF TUNNELED CENTRAL VENOUS CATHETER (CVC) WITH SUBCUTANEOUS PORT N/A 1/25/2023    Procedure: INSERTION, DUAL LUMEN CATHETER WITH PORT, WITH IMAGING GUIDANCE;  Surgeon: FARIDEH Muñoz III, MD;  [facility];  Service: Peripheral Vascular;  Laterality: N/A;  possinble permcath placment    NEPHRECTOMY-LAPAROSCOPIC  Left 01/12/2016    PERCUTANEOUS TRANSLUMINAL ANGIOPLASTY OF ARTERIOVENOUS FISTULA Left 4/19/2023    Procedure: PTA, AV FISTULA;  Surgeon: FARIDEH Muñoz III, MD;  Location: [facility] CATH LAB;  Service: Peripheral Vascular;  Laterality: Left;    PERITONEAL CATHETER INSERTION      Permacath insertion  01/12/2017    PLACEMENT OF ARTERIOVENOUS GRAFT  12/28/2022    Procedure: INSERTION, GRAFT, ARTERIOVENOUS;  Surgeon: FARIDEH Muñoz III, MD;  [facility];  Service: Peripheral Vascular;;    REMOVAL, GRAFT, ARTERIOVENOUS, UPPER EXTREMITY Left 1/25/2023    Procedure: REMOVAL, GRAFT, ARTERIOVENOUS, UPPER EXTREMITY;  Surgeon: FARIDEH Muñoz III, MD;  [facility];  Service: Peripheral Vascular;  Laterality: Left;    REVISION OF ARTERIOVENOUS FISTULA Left 5/1/2023    Procedure: REVISION, AV FISTULA;  Surgeon: FARIDEH Muñoz III, MD;  [facility];  Service: Peripheral Vascular;  Laterality: Left;  LUE AVG revision    REVISION OF PROCEDURE INVOLVING ARTERIOVENOUS GRAFT Left 12/28/2022    Procedure: REVISION, PROCEDURE INVOLVING ARTERIOVENOUS GRAFT;  Surgeon: FARIDEH Muñoz III, MD;  [facility];  Service: Peripheral Vascular;  Laterality: Left;    REVISION OF PROCEDURE INVOLVING ARTERIOVENOUS GRAFT Left 7/21/2023    Procedure: LEFT ARM ARTERIOVENOUS GRAFT EXCISION  AND LIGATION;  Surgeon: Obi Werner MD;  Location: [facility];  Service: Vascular;  Laterality: Left;  Left AVG excision and revision with interposition    SALPINGOOPHORECTOMY Right 2016    KJB---DAVINCI    TONSILLECTOMY      TUBAL LIGATION         Time Tracking:     OT Date of Treatment: 01/04/24  OT Start Time: 0950  OT Stop Time: 1014  OT Total Time (min): 24 min    Billable Minutes: Evaluation 15  and Neuromuscular Re-education 9     Co-eval. With PT   1/4/2024

## 2024-01-04 NOTE — CONSULTS
[facility]  Wound Care  [facility]    Patient Name:  Eva Bloom   MRN:  [id number]  Date: 1/4/2024  Diagnosis: CVA (cerebral vascular accident)    History:     Past Medical History:   Diagnosis Date    Anemia     Anticoagulant long-term use     Atrial fibrillation     Bronchitis 03/01/2017    Cancer 2016    kidney cancer    CHF (congestive heart failure), NYHA class II, chronic, systolic     CMV (cytomegalovirus) antibody positive     CVA (cerebral vascular accident) 1/3/2024    Encounter for blood transfusion     ESRD (end stage renal disease)     Essential hypertension 09/23/2015    H/O herpes simplex type 2 infection     Herpes simplex type 1 antibody positive     History of kidney cancer     s/p left nephrectomy 1/2016    Hyperparathyroidism, unspecified     Hyperuricemia without signs of inflammatory arthritis and tophaceous disease     Kidney stones     LGSIL (low grade squamous intraepithelial dysplasia)     Myocardiopathy 07/21/2017    Prediabetes     Proteinuria     Renal disorder     Thyroid nodule     Urate nephropathy        Social History     Socioeconomic History    Marital status:     Number of children: 2   Occupational History    Occupation: [employer]     Employer: [id number]   Tobacco Use    Smoking status: Never     Passive exposure: Never    Smokeless tobacco: Never   Substance and Sexual Activity    Alcohol use: No    Drug use: Yes     Types: Hydrocodone    Sexual activity: Not Currently     Social Determinants of Health     Financial Resource Strain: Low Risk  (9/28/2023)    Overall Financial Resource Strain (CARDIA)     Difficulty of Paying Living Expenses: Not hard at all   Food Insecurity: No Food Insecurity (9/28/2023)    Hunger Vital Sign     Worried About Running Out of Food in the Last Year: Never true     Ran Out of Food in the Last Year: Never true   Transportation Needs: No Transportation Needs (9/28/2023)    PRAPARE - Transportation     Lack of Transportation  (Medical): No     Lack of Transportation (Non-Medical): No   Physical Activity: Inactive (9/28/2023)    Exercise Vital Sign     Days of Exercise per Week: 0 days     Minutes of Exercise per Session: 0 min   Stress: No Stress Concern Present (9/28/2023)    [employer] [place] [employer] - Occupational Stress Questionnaire     Feeling of Stress : Not at all   Social Connections: Moderately Integrated (9/28/2023)    Social Connection and Isolation Panel [NHANES]     Frequency of Communication with Friends and Family: More than three times a week     Frequency of Social Gatherings with Friends and Family: More than three times a week     Attends Confucianist Services: More than 4 times per year     Active Member of Clubs or Organizations: No     Attends Club or Organization Meetings: Never     Marital Status:    Housing Stability: Low Risk  (9/28/2023)    Housing Stability Vital Sign     Unable to Pay for Housing in the Last Year: No     Number of Places Lived in the Last Year: 1     Unstable Housing in the Last Year: No       Precautions:     Allergies as of 01/02/2024 - Reviewed 01/02/2024   Allergen Reaction Noted    Coreg [carvedilol] Other (See Comments) 05/17/2017    Allopurinol  07/17/2012       [facility] Assessment Details/Treatment     Active Problem List with Overview Notes    Diagnosis Date Noted    CVA (cerebral vascular accident) 01/03/2024    Acute metabolic encephalopathy 01/02/2024    Acute blood loss anemia 11/01/2023    PAD (peripheral artery disease) 11/01/2023    Hypothermia 10/12/2023    Severe protein-calorie malnutrition 10/11/2023    ACP (advance care planning) 10/11/2023    Weight loss 10/10/2023    Body mass index (BMI) less than 19 09/28/2023    Hoarseness of voice 09/21/2023    Sensation of fullness in both ears 09/21/2023    Chronic combined systolic and diastolic heart failure 08/25/2023    Metastatic renal cell carcinoma to lung, unspecified laterality 08/11/2023    Hemorrhoid  "07/24/2023    Critical limb ischemia of left lower extremity with gangrene 07/20/2023    Ischemic ulcer of toe of left foot, with unspecified severity 07/19/2023    Hypothyroidism 07/19/2023    Aortic atherosclerosis 07/06/2023    Arteriovenous graft infection 01/24/2023    Malfunction of arteriovenous dialysis fistula 12/16/2022    Debility 11/04/2022    Severe sepsis 10/29/2022    PAF (paroxysmal atrial fibrillation) 07/25/2022    Glucose intolerance 04/18/2022    Hypercalcemia 04/04/2022    ICD (implantable cardioverter-defibrillator) in place - SubQ 07/15/2021    Hyperphosphatemia     Anemia in ESRD (end-stage renal disease)     Chronic kidney disease-mineral and bone disorder     Chronic combined systolic and diastolic congestive heart failure     Nonrheumatic mitral valve regurgitation 06/01/2020    ESRD (end stage renal disease) on dialysis      - Tuesday, Thursday, and Saturday  - [place]  - Dr. Hart, nephrology      Cardiomyopathy, nonischemic 07/21/2017    Pulmonary hypertension 07/21/2017    Primary insomnia 06/14/2017    Stenosis of arteriovenous dialysis fistula 02/17/2017    S/p nephrectomy left 10/07/2016    History of kidney cancer 09/02/2016    Renal cell carcinoma of left kidney 01/27/2016    Multinodular goiter 11/25/2015     - benign FNA 12/2015      CMV (cytomegalovirus) antibody positive     Herpes simplex type 1 antibody positive     H/O herpes simplex type 2 infection      - positive antibody test  - reports no hx of outbreaks      Essential hypertension 09/23/2015    Pap smear abnormality of cervix with ASCUS favoring benign 09/23/2015     She is having the PAP "once a year and comes back ok she says"      Urate nephropathy     Kidney stones     Hyperparathyroidism, secondary renal     Hyperuricemia without signs of inflammatory arthritis and tophaceous disease     Proteinuria       Consulted by nursing for altered skin integrity to gluteal folds-excoriated  A 58 year old female admitted " 1/2/24 from home with confusion, talking to herself, and sleeping more for a few days.   1/4 WBC 6.18 Hgb 12.6 Hct 43.8   1/2 Alb 3.1 Weight 94 lbs  On Isoflex mattress; Marcelino score 15  Assessment:  Perineum severely denuded. Having incontinent liquid stool.   No pressure injury sacrum.     Right lateral leg- Chronic ulcer with small amount yellow drainage.   Treatment/Plan:  Continue local wound care to right lateral leg with Mepilex foam. Change dressing twice a week and prn  Local wound care to perineum every shift and prn- Cleanse with Remedy Foaming cleanser and apply thin coat of Remedy Zinc barrier (orange top tube)  Avoid adult brief. Discussed with family and nursing.  Recommendations made to primary team. Orders placed.     01/04/2024

## 2024-01-04 NOTE — PROGRESS NOTES
[facility] Medicine  Progress Note    Patient Name: Eva Bloom  MRN: [id number]  Patient Class: IP- Inpatient   Admission Date: 1/2/2024  Length of Stay: 2 days  Attending Physician: José Miguel Cristina, *  Primary Care Provider: Sowmya Mccain MD        Subjective:     Principal Problem:CVA (cerebral vascular accident)        HPI:  57 yo F with a PMHx of CHF with EF of 23%, ESRD on dialysis (TTS), Afib on OAC, AICD placement (currently disconnected per ), RCC with metastatic disease to lung and pancreas, presenting to the ED BIB  for altered mental status. History is provided by independent historian, pt's , reports for the last few days, she has been confused, intermittently falling asleep, experiencing auditory hallucinations, and talking to herself, and experiencing dark stools.  took her to dialysis this morning, was able to receive full session but brought her straight here afterwards for further evaluation. She currently has a port a cath to her chest wall for dialysis, and is anuric, no recent missed sessions. Her nephrologist is Dr Bender.  reports she ambulates w/ a walker, is able to hold a full conversation and answer questions at baseline. She has been able to recognize him the last few days.  also reports she received a dose of the COVID vaccine around 4 days ago.   In ER patient is febrile 100.8,chest X ray with no sign of pneumonia,Covid and flu are negative,patient received broad spectrum IV Abx,not able get urine,not urinating,blood culture is done,her HH is stable,head CT suspect for CVA,neurology is consulted and patient will have MRI brain and EEG.    Overview/Hospital Course:  57 yo F with a PMHx of CHF with EF of 23%, ESRD on dialysis (TTS), Afib on OAC, AICD placement (currently disconnected per ), RCC with metastatic disease to lung and pancreas, presenting to the ED BIB  for altered mental status. She  currently has a port a cath to her chest wall for dialysis, and is anuric, no recent missed sessions.  also reports she received a dose of the COVID vaccine around 4 days ago.   In ER patient is febrile 100.8,chest X ray with no sign of pneumonia,Covid and flu are negative,patient received broad spectrum IV Abx,not able get urine,not urinating,blood culture is done,her HH is stable,head CT suspect for CVA,neurology is consulted ,can nt have MRI duo to AICD,  CTA more consistent with CVA,on ASA,statin and  PT,OT,ST is consulted.  Much more alert today,did well with PT,OT.    Past Medical History:   Diagnosis Date    Anemia     Anticoagulant long-term use     Atrial fibrillation     Bronchitis 03/01/2017    Cancer 2016    kidney cancer    CHF (congestive heart failure), NYHA class II, chronic, systolic     CMV (cytomegalovirus) antibody positive     CVA (cerebral vascular accident) 1/3/2024    Encounter for blood transfusion     ESRD (end stage renal disease)     Essential hypertension 09/23/2015    H/O herpes simplex type 2 infection     Herpes simplex type 1 antibody positive     History of kidney cancer     s/p left nephrectomy 1/2016    Hyperparathyroidism, unspecified     Hyperuricemia without signs of inflammatory arthritis and tophaceous disease     Kidney stones     LGSIL (low grade squamous intraepithelial dysplasia)     Myocardiopathy 07/21/2017    Prediabetes     Proteinuria     Renal disorder     Thyroid nodule     Urate nephropathy        Past Surgical History:   Procedure Laterality Date    BREAST CYST EXCISION      COLONOSCOPY N/A 11/12/2015    COLONOSCOPY N/A 3/12/2021    Procedure: COLONOSCOPY;  Surgeon: Brendon Lanier MD;  Location: [facility];  Service: Endoscopy;  Laterality: N/A;  covid test 3/9, labs prior, prep instr mailed -ml    EXCISION OF ARTERIOVENOUS FISTULA Left 9/27/2023    Procedure: EXCISION, AV FISTULA;  Surgeon: FARIDEH Muñoz III, MD;  Location: [facility] OR [address] [place] [address];   Service: Vascular;  Laterality: Left;  LUE AV graft excision    INSERTION OF BIVENTRICULAR IMPLANTABLE CARDIOVERTER-DEFIBRILLATOR (ICD)  04/2021    INSERTION OF TUNNELED CENTRAL VENOUS CATHETER (CVC) WITH SUBCUTANEOUS PORT N/A 1/25/2023    Procedure: INSERTION, DUAL LUMEN CATHETER WITH PORT, WITH IMAGING GUIDANCE;  Surgeon: FARIDEH Muñoz III, MD;  Location: [facility] OR [address] [place] [address];  Service: Peripheral Vascular;  Laterality: N/A;  possinble permcath placment    NEPHRECTOMY-LAPAROSCOPIC Left 01/12/2016    PERCUTANEOUS TRANSLUMINAL ANGIOPLASTY OF ARTERIOVENOUS FISTULA Left 4/19/2023    Procedure: PTA, AV FISTULA;  Surgeon: FARIDEH Muñoz III, MD;  Location: [facility] CATH LAB;  Service: Peripheral Vascular;  Laterality: Left;    PERITONEAL CATHETER INSERTION      Permacath insertion  01/12/2017    PLACEMENT OF ARTERIOVENOUS GRAFT  12/28/2022    Procedure: INSERTION, GRAFT, ARTERIOVENOUS;  Surgeon: FARIDEH Muñoz III, MD;  [facility];  Service: Peripheral Vascular;;    REMOVAL, GRAFT, ARTERIOVENOUS, UPPER EXTREMITY Left 1/25/2023    Procedure: REMOVAL, GRAFT, ARTERIOVENOUS, UPPER EXTREMITY;  Surgeon: FARIDEH Muñoz III, MD;  [facility];  Service: Peripheral Vascular;  Laterality: Left;    REVISION OF ARTERIOVENOUS FISTULA Left 5/1/2023    Procedure: REVISION, AV FISTULA;  Surgeon: FARIDEH Muñoz III, MD;  [facility];  Service: Peripheral Vascular;  Laterality: Left;  LUE AVG revision    REVISION OF PROCEDURE INVOLVING ARTERIOVENOUS GRAFT Left 12/28/2022    Procedure: REVISION, PROCEDURE INVOLVING ARTERIOVENOUS GRAFT;  Surgeon: FARIDEH Muñoz III, MD;  [facility];  Service: Peripheral Vascular;  Laterality: Left;    REVISION OF PROCEDURE INVOLVING ARTERIOVENOUS GRAFT Left 7/21/2023    Procedure: LEFT ARM ARTERIOVENOUS GRAFT EXCISION  AND LIGATION;  Surgeon: Obi Werner MD;  Location: [facility];  Service: Vascular;  Laterality: Left;  Left AVG excision  and revision with interposition    SALPINGOOPHORECTOMY Right 2016    KJB---DAVINCI    TONSILLECTOMY      TUBAL LIGATION         Review of patient's allergies indicates:   Allergen Reactions    Coreg [carvedilol] Other (See Comments)     Nausea/vomiting    Allopurinol      Other reaction(s): abnormal transaminases       Current Facility-Administered Medications on File Prior to Encounter   Medication    0.9%  NaCl infusion     Current Outpatient Medications on File Prior to Encounter   Medication Sig    acetaminophen (TYLENOL) 500 MG tablet Take 500 mg by mouth every 6 (six) hours as needed for Pain.    albuterol-ipratropium (DUO-NEB) 2.5 mg-0.5 mg/3 mL nebulizer solution Take by nebulization.    apixaban (ELIQUIS) 2.5 mg Tab Take 1 tablet (2.5 mg total) by mouth 2 (two) times daily.    bisacodyL (DULCOLAX) 10 mg Supp Place 10 mg rectally.    cabozantinib (CABOMETYX) 60 mg Tab TAKE ONE TABLET BY MOUTH ONCE DAILY AT THE SAME TIME ON AN EMPTY STOMACH AT LEAST 1 HOUR BEFORE OR 2 HOURS AFTER EATING. AVOID GRAPEFRUIT PRODUCTS    cloNIDine 0.3 mg/24 hr td ptwk (CATAPRES) 0.3 mg/24 hr Place 1 patch onto the skin every 7 days.    cyclobenzaprine (FLEXERIL) 5 MG tablet TAKE 1 TABLET BY MOUTH DAILY AS NEEDED FOR MUSCLE SPASMS.    ENTRESTO 49-51 mg per tablet TAKE 1 TABLET BY MOUTH TWICE A DAY    epoetin david-epbx (RETACRIT INJ) Epoetin david - epbx (Retacrit)    fluticasone propionate (FLONASE) 50 mcg/actuation nasal spray 1 spray (50 mcg total) by Each Nostril route 2 (two) times daily as needed (ear fullness).    hyoscyamine (LEVSIN/SL) 0.125 mg Subl Place under the tongue.    lanthanum (FOSRENOL) 1000 MG chewable tablet Take 1 tablet by mouth.    levothyroxine (SYNTHROID) 75 MCG tablet Take 1 tablet (75 mcg total) by mouth once daily.    lidocaine-prilocaine (EMLA) cream APPLY ATLEAST 30 MINUTES BEFORE TREATMENT 3 TIMES A WEEK    LORAZEPAM INTENSOL 2 mg/mL Conc Take by mouth.    metoprolol succinate (TOPROL-XL) 100 MG 24 hr  tablet Take 1/2 tablet (50mg) once daily    mirtazapine (REMERON) 15 MG tablet Take 15 mg by mouth every evening.    morphine 100 mg/5 mL (20 mg/mL) concentrated solution Take by mouth.    mv,Ca,min-folic acid-vit K1 (ONE-A-DAY WOMEN'S 50 PLUS) 400-20 mcg Tab Take 1 tablet by mouth once daily.    naloxone (NARCAN) 1 mg/mL injection 2 mg (1 mg per nostril) by Nasal route as needed for opioid overdose; may repeat in 3 to 5 minutes if not effective. Call 911    nystatin (MYCOSTATIN) cream SMARTSIG:sparingly Topical 2-4 Times Daily PRN    ondansetron (ZOFRAN-ODT) 4 MG TbDL Take by mouth.    pantoprazole (PROTONIX) 40 MG tablet Take 1 tablet (40 mg total) by mouth 2 (two) times daily.    traMADoL (ULTRAM) 50 mg tablet Take 1 tablet (50 mg total) by mouth every 12 (twelve) hours as needed for Pain.    [DISCONTINUED] amLODIPine (NORVASC) 5 MG tablet TAKE 1 TABLET BY MOUTH EVERY DAY     Family History       Problem Relation (Age of Onset)    Cataracts Maternal Aunt    Diabetes Father, Maternal Grandfather    Heart disease Sister    Hypertension Mother    Kidney disease Father    No Known Problems Sister, Sister, Brother, Brother, Maternal Uncle, Paternal Aunt, Paternal Uncle, Maternal Grandmother, Paternal Grandmother, Paternal Grandfather, Son, Son, Other          Tobacco Use    Smoking status: Never     Passive exposure: Never    Smokeless tobacco: Never   Substance and Sexual Activity    Alcohol use: No    Drug use: Yes     Types: Hydrocodone    Sexual activity: Not Currently     Review of Systems   Constitutional:  Positive for activity change and appetite change.   HENT:  Negative for congestion and dental problem.    Eyes:  Negative for discharge and itching.   Respiratory:  Negative for apnea and chest tightness.    Cardiovascular:  Negative for chest pain and leg swelling.   Gastrointestinal:  Negative for abdominal distention.   Endocrine: Negative for cold intolerance and heat intolerance.   Genitourinary:   Negative for difficulty urinating and dyspareunia.   Musculoskeletal:  Negative for arthralgias and back pain.   Skin:  Negative for color change and pallor.   Allergic/Immunologic: Negative for environmental allergies and food allergies.   Neurological:  Positive for weakness. Negative for dizziness and facial asymmetry.   Hematological:  Negative for adenopathy. Does not bruise/bleed easily.   Psychiatric/Behavioral:  Positive for confusion.      Objective:     Vital Signs (Most Recent):  Temp: 97.7 °F (36.5 °C) (01/04/24 0750)  Pulse: (!) 111 (01/04/24 0750)  Resp: 18 (01/04/24 0750)  BP: 120/87 (01/04/24 0750)  SpO2: 100 % (01/04/24 0750) Vital Signs (24h Range):  Temp:  [97.5 °F (36.4 °C)-98.7 °F (37.1 °C)] 97.7 °F (36.5 °C)  Pulse:  [103-111] 111  Resp:  [18-19] 18  SpO2:  [91 %-100 %] 100 %  BP: (108-134)/(77-87) 120/87     Weight: 42.6 kg (94 lb)  Body mass index is 16.14 kg/m².     Physical Exam  HENT:      Head: Normocephalic.      Nose: Nose normal.      Mouth/Throat:      Mouth: Mucous membranes are dry.   Eyes:      Extraocular Movements: Extraocular movements intact.      Pupils: Pupils are equal, round, and reactive to light.   Cardiovascular:      Rate and Rhythm: Tachycardia present. Rhythm irregular.      Heart sounds: No murmur heard.  Pulmonary:      Effort: No respiratory distress.   Abdominal:      General: There is no distension.   Musculoskeletal:         General: No swelling or deformity.   Skin:     Coloration: Skin is not jaundiced.      Findings: No bruising.   Neurological:      Mental Status: She is alert.      Comments: Not able complete duo to confusion.   Psychiatric:      Comments: Confused,              CRANIAL NERVES     CN III, IV, VI   Pupils are equal, round, and reactive to light.       Significant Labs: All pertinent labs within the past 24 hours have been reviewed.  BMP:   Recent Labs   Lab 01/04/24  0430   GLU 75      K 4.5      CO2 18*   BUN 45*   CREATININE  6.9*   CALCIUM 10.3       CBC:   Recent Labs   Lab 01/03/24  0917 01/04/24  0430   WBC 5.76 6.18   HGB 13.3 12.6   HCT 47.0 43.8    311       CMP:   Recent Labs   Lab 01/03/24  0917 01/04/24  0430    138   K 4.4 4.5    104   CO2 23 18*   GLU 73 75   BUN 39* 45*   CREATININE 5.3* 6.9*   CALCIUM 10.9* 10.3   ANIONGAP 13 16         Significant Imaging: I have reviewed all pertinent imaging results/findings within the past 24 hours.    Assessment/Plan:      * CVA (cerebral vascular accident)  head CT suspect for CVA,neurology is consulted ,can nt have MRI duo to AICD,  CTA more consistent with CVA,on ASA,statin and  PT,OT,ST is consulted.  Antithrombotics for secondary stroke prevention: Antiplatelets: Aspirin: 81 mg daily  Anticoagulants: Apixaban 2.5 mg BID     Statins for secondary stroke prevention and hyperlipidemia, if present:   Statins: Atorvastatin- 40 mg daily    Aggressive risk factor modification: HTN     Rehab efforts: The patient has been evaluated by a stroke team provider and the therapy needs have been fully considered based off the presenting complaints and exam findings. The following therapy evaluations are needed: PT evaluate and treat, OT evaluate and treat, SLP evaluate and treat    Diagnostics ordered/pending: CTA Head to assess vasculature     VTE prophylaxis:  eliquis    BP parameters: permissive hTN        Acute metabolic encephalopathy  Not sure etiology at this time.  In ER patient is febrile 100.8,chest X ray with no sign of pneumonia,Covid and flu are negative,patient received broad spectrum IV Abx,not able get urine,not urinating,blood culture is done,her HH is stable,head CT suspect for CVA,neurology is consulted ,likely duo dmitriy CVA,  Much more alert today,did well with PT,OT.    Severe protein-calorie malnutrition  Nutrition consulted. Most recent weight and BMI monitored-     Measurements:  Wt Readings from Last 1 Encounters:   01/02/24 39.9 kg (88 lb)   Body mass  index is 15.11 kg/m².    Patient has been screened and assessed by RD.    Malnutrition Type:  Context:    Level:      Malnutrition Characteristic Summary:       Interventions/Recommendations (treatment strategy):         Body mass index (BMI) less than 19  Supplement when is more stable.      Metastatic renal cell carcinoma to lung, unspecified laterality    Cancer Staging   Renal cell carcinoma of left kidney  Staging form: Kidney, AJCC 8th Edition  - Pathologic stage from 1/12/2016: Stage Unknown (pT1b(2), pNX, cM0) - Unsigned  - Clinical stage from 11/22/2016: Stage IV (rcTX, cN0, pM1) - Signed by Liset Ngo NP on 9/13/2023        Hypothyroidism  TSH WNL,resumed synthroid.      Aortic atherosclerosis  On medical treatments.      PAF (paroxysmal atrial fibrillation)  Patient with Persistent (7 days or more) atrial fibrillation which is controlled currently with  OAC  . Patient is currently in atrial fibrillation.[id number] Score: 2. HASBLED Score: 3 Anticoagulation indicated. Anticoagulation done with eliquis .    Anemia in ESRD (end-stage renal disease)  Patient's anemia is currently controlled. Has not received any PRBCs to date. Etiology likely d/t Iron deficiency  Current CBC reviewed-   Lab Results   Component Value Date    HGB 14.0 01/02/2024    HCT 49.8 (H) 01/02/2024     Monitor serial CBC and transfuse if patient becomes hemodynamically unstable, symptomatic or H/H drops below 7/21.    Chronic combined systolic and diastolic congestive heart failure  Patient is identified as having Combined Systolic and Diastolic heart failure that is Chronic. CHF is currently controlled. Latest ECHO performed and demonstrates- Results for orders placed during the hospital encounter of 05/08/23    Echo Saline Bubble? No    Interpretation Summary  · The left ventricle is normal in size with mild eccentric hypertrophy and severely decreased systolic function.  · The estimated ejection fraction is 23%.  · There is  "severe left ventricular global hypokinesis.  · Grade II left ventricular diastolic dysfunction.  · Moderate left atrial enlargement.  · Normal right ventricular size with normal right ventricular systolic function.  · Moderate right atrial enlargement.  · There is mild aortic valve stenosis.  · Aortic valve area is 1.58 cm2; peak velocity is 2.01 m/s; mean gradient is 8 mmHg.  · Moderate mitral regurgitation.  · Moderate to severe tricuspid regurgitation.  · Mild pulmonic regurgitation.  · Elevated central venous pressure (15 mmHg).  · The estimated PA systolic pressure is 47 mmHg.  · There is mild-moderate pulmonary hypertension.  · Moderate posterolateral pericardial effusion. Trivial under the RA  . Continue Beta Blocker and monitor clinical status closely. Monitor on telemetry. Patient is off CHF pathway.  Monitor strict Is&Os and daily weights.  Place on fluid restriction of 1.5 L. Cardiology has not been consulted. Continue to stress to patient importance of self efficacy and  on diet for CHF. Last BNP reviewed- and noted below No results for input(s): "BNP", "BNPTRIAGEBLO" in the last 168 hours.    ESRD (end stage renal disease) on dialysis  Creatine stable for now. BMP reviewed- noted Estimated Creatinine Clearance: 9.8 mL/min (A) (based on SCr of 3.9 mg/dL (H)). according to latest data. Based on current GFR, CKD stage is end stage.  Monitor UOP and serial BMP and adjust therapy as needed. Renally dose meds. Avoid nephrotoxic medications and procedures.    Pulmonary hypertension  Fluid removal with HD      Cardiomyopathy, nonischemic  EF of 23%,will monitor.      S/p nephrectomy left  in 2016,will monitor.      History of kidney cancer  S/P left kidney nephrectomy in 2016.      Essential hypertension  Stable,will monitor.            VTE Risk Mitigation (From admission, onward)           Ordered     apixaban tablet 2.5 mg  2 times daily         01/02/24 1430                    Discharge Planning "   MAVIS:      Code Status: Full Code   Is the patient medically ready for discharge?:     Reason for patient still in hospital (select all that apply): Patient trending condition  Discharge Plan A: Home with family                  José Miguel Cristina MD  Department of Hospital Medicine   [facility]

## 2024-01-04 NOTE — ASSESSMENT & PLAN NOTE
Not sure etiology at this time.  In ER patient is febrile 100.8,chest X ray with no sign of pneumonia,Covid and flu are negative,patient received broad spectrum IV Abx,not able get urine,not urinating,blood culture is done,her HH is stable,head CT suspect for CVA,neurology is consulted ,likely duo dmitriy CVA,  Much more alert today,did well with PT,OT.

## 2024-01-05 ENCOUNTER — TELEPHONE (OUTPATIENT)
Dept: HOME HEALTH SERVICES | Facility: CLINIC | Age: 59
End: 2024-01-05
Payer: MEDICARE

## 2024-01-05 VITALS
TEMPERATURE: 98 F | DIASTOLIC BLOOD PRESSURE: 84 MMHG | WEIGHT: 94 LBS | SYSTOLIC BLOOD PRESSURE: 121 MMHG | HEART RATE: 107 BPM | BODY MASS INDEX: 16.05 KG/M2 | RESPIRATION RATE: 18 BRPM | OXYGEN SATURATION: 94 % | HEIGHT: 64 IN

## 2024-01-05 DIAGNOSIS — I63.9 CEREBROVASCULAR ACCIDENT (CVA), UNSPECIFIED MECHANISM: ICD-10-CM

## 2024-01-05 DIAGNOSIS — G93.41 ACUTE METABOLIC ENCEPHALOPATHY: Primary | ICD-10-CM

## 2024-01-05 DIAGNOSIS — I42.8 CARDIOMYOPATHY, NONISCHEMIC: Chronic | ICD-10-CM

## 2024-01-05 LAB — POCT GLUCOSE: 86 MG/DL (ref 70–110)

## 2024-01-05 PROCEDURE — 99497 ADVNCD CARE PLAN 30 MIN: CPT | Mod: GW,HPC,, | Performed by: REGISTERED NURSE

## 2024-01-05 PROCEDURE — 25000003 PHARM REV CODE 250: Performed by: INTERNAL MEDICINE

## 2024-01-05 PROCEDURE — 99233 SBSQ HOSP IP/OBS HIGH 50: CPT | Mod: GW,HPC,, | Performed by: REGISTERED NURSE

## 2024-01-05 PROCEDURE — 25000003 PHARM REV CODE 250: Performed by: HOSPITALIST

## 2024-01-05 PROCEDURE — 97530 THERAPEUTIC ACTIVITIES: CPT

## 2024-01-05 PROCEDURE — 97535 SELF CARE MNGMENT TRAINING: CPT

## 2024-01-05 RX ORDER — ATORVASTATIN CALCIUM 40 MG/1
40 TABLET, FILM COATED ORAL DAILY
Qty: 90 TABLET | Refills: 0 | Status: SHIPPED | OUTPATIENT
Start: 2024-01-05 | End: 2024-04-04

## 2024-01-05 RX ORDER — ASPIRIN 81 MG/1
81 TABLET ORAL DAILY
Qty: 90 TABLET | Refills: 3 | Status: SHIPPED | OUTPATIENT
Start: 2024-01-05 | End: 2025-01-04

## 2024-01-05 RX ADMIN — APIXABAN 2.5 MG: 2.5 TABLET, FILM COATED ORAL at 09:01

## 2024-01-05 RX ADMIN — FAMOTIDINE 20 MG: 20 TABLET ORAL at 09:01

## 2024-01-05 RX ADMIN — ASPIRIN 81 MG: 81 TABLET, COATED ORAL at 09:01

## 2024-01-05 RX ADMIN — LEVOTHYROXINE SODIUM 75 MCG: 75 TABLET ORAL at 05:01

## 2024-01-05 RX ADMIN — MUPIROCIN: 20 OINTMENT TOPICAL at 09:01

## 2024-01-05 RX ADMIN — ATORVASTATIN CALCIUM 40 MG: 40 TABLET, FILM COATED ORAL at 09:01

## 2024-01-05 NOTE — PT/OT/SLP PROGRESS
Occupational Therapy   Treatment    Name: Eva Bloom  MRN: [id number]  Admitting Diagnosis:  CVA (cerebral vascular accident)       Recommendations:     Discharge Recommendations: Low Intensity Therapy  Discharge Equipment Recommendations:  none  Barriers to discharge:  None    Assessment:     Eva Bloom is a 58 y.o. female with a medical diagnosis of CVA (cerebral vascular accident).  She presents with sitting EOB eating breakfast with  present. Performance deficits affecting function are impaired endurance, weakness, impaired self care skills, impaired functional mobility, gait instability, impaired balance, impaired cognition, decreased safety awareness, pain.     Rehab Prognosis:  Fair +; patient would benefit from acute skilled OT services to address these deficits and reach maximum level of function.       Plan:     Patient to be seen 3 x/week to address the above listed problems via self-care/home management, therapeutic activities, therapeutic exercises, neuromuscular re-education  Plan of Care Expires: 01/11/24  Plan of Care Reviewed with: patient, spouse    Subjective     Chief Complaint: having a BM in bed   Patient/Family Comments/goals: return home with    Pain/Comfort:  Pain Rating 1: other (see comments) (she did not rate, but had some L heel pain when donning sock)    Objective:     Communicated with: RNSharifa, prior to session.  Patient found sitting edge of bed with  (no telemetry) upon OT entry to room.    General Precautions: Standard, fall    Orthopedic Precautions:N/A  Braces: N/A  Respiratory Status: Room air     Occupational Performance:     Bed Mobility:    Patient completed Scooting/Bridging with stand by assistance     Functional Mobility/Transfers:  Patient completed Sit <> Stand Transfer with contact guard assistance  with  rolling walker   Patient completed Bed <> Chair Transfer using Step Transfer technique with contact guard assistance with rolling walker x 3  trials due to toileting accident   Functional Mobility: Patient took 2-3 steps from bed to chair after multiple stands for toileting care due to BM accident     Activities of Daily Living:  Upper Body Dressing: maximal assistance to don and doff gowns   Lower Body Dressing: total assistance to don socks and brief   Toileting: total assistance to don and doff soiled gowns after BM, do perineal care, and change soiled linens.       [facility] 6 Click ADL: 15    Treatment & Education:  Patient was fatigued after multiple sit to stand t/f's, so sat in chair while bed linens changed when occupational therapy left room.     Patient left up in chair with all lines intact, call button in reach, and RN  notified    GOALS:   Multidisciplinary Problems       Occupational Therapy Goals          Problem: Occupational Therapy    Goal Priority Disciplines Outcome Interventions   Occupational Therapy Goal     OT, PT/OT Ongoing, Progressing    Description: Goals to be met by: 01/11/24     Patient will increase functional independence with ADLs by performing:    UE Dressing with Supervision.  LE Dressing with Supervision.  Grooming while seated at sink with Supervision.  Toileting from bedside commode with Supervision for hygiene and clothing management.   Sitting in chair  x30-60 minutes with Supervision.  Toilet transfer to bedside commode with Supervision.  Upper extremity exercise program x10  reps per handout, with assistance as needed.                         Time Tracking:     OT Date of Treatment: 01/05/24  OT Start Time: 0915  OT Stop Time: 1000  OT Total Time (min): 45 min    Billable Minutes:Self Care/Home Management 30   Therapeutic Activity 15     OT/CORNELL: OT          1/5/2024

## 2024-01-05 NOTE — PLAN OF CARE
Problem: Occupational Therapy  Goal: Occupational Therapy Goal  Description: Goals to be met by: 01/11/24     Patient will increase functional independence with ADLs by performing:    UE Dressing with Supervision.  LE Dressing with Supervision.  Grooming while seated at sink with Supervision.  Toileting from bedside commode with Supervision for hygiene and clothing management.   Sitting in chair  x30-60 minutes with Supervision.  Toilet transfer to bedside commode with Supervision.  Upper extremity exercise program x10  reps per handout, with assistance as needed.    Outcome: Ongoing, Progressing   Patient able to do prolonged standing for toileting care, but unable to t/f to BSC due to BM accident, so needed total assistance with soiled linens, perineal hygiene, and brief management.

## 2024-01-05 NOTE — NURSING
[facility]  Nurses Note -- 4 Eyes      1-4-24      Skin assessed on: Q Shift      [] No Pressure Injuries Present    [x]Prevention Measures Documented    [x] Yes LDA  for Pressure Injury Previously documented     [] Yes New Pressure Injury Discovered   [] LDA for New Pressure Injury Added      Attending RN:  Ashley Dick RN     Second RN:  Genna Millan RN

## 2024-01-05 NOTE — PLAN OF CARE
[facility] [place] [facility] [place] [facility]      HOME HEALTH ORDERS  FACE TO FACE ENCOUNTER    Patient Name: Eva Bloom  YOB: 1965    PCP: Sowmya Mccain MD   PCP Address: [address] / [id number]  PCP Phone Number: [phone]  PCP Fax: [phone]    Encounter Date: 1/2/24    Admit to Home Health    Diagnoses:  Active Hospital Problems    Diagnosis  POA    *CVA (cerebral vascular accident) [I63.9]  Yes     Priority: 1 - High    Acute metabolic encephalopathy [G93.41]  Yes     Priority: 2     Incontinence associated dermatitis [L25.8, R32]  Yes    Severe protein-calorie malnutrition [E43]  Yes    ACP (advance care planning) [Z71.89]  Not Applicable    Body mass index (BMI) less than 19 [Z68.1]  Not Applicable    Metastatic renal cell carcinoma to lung, unspecified laterality [C78.00, C64.9]  Yes    Hypothyroidism [E03.9]  Yes     Chronic    Aortic atherosclerosis [I70.0]  Yes    PAF (paroxysmal atrial fibrillation) [I48.0]  Yes     Chronic    Anemia in ESRD (end-stage renal disease) [N18.6, D63.1]  Yes     Chronic    Chronic combined systolic and diastolic congestive heart failure [I50.42]  Yes    ESRD (end stage renal disease) on dialysis [N18.6, Z99.2]  Not Applicable     Chronic     - Tuesday, Thursday, and Saturday  - Chelsie Hart, nephrology      Cardiomyopathy, nonischemic [I42.8]  Yes     Chronic    Pulmonary hypertension [I27.20]  Yes    S/p nephrectomy left [Z90.5]  Not Applicable     Chronic    History of kidney cancer [Z85.528]  Not Applicable    Essential hypertension [I10]  Yes     Chronic      Resolved Hospital Problems   No resolved problems to display.       Follow Up Appointments:  Future Appointments   Date Time Provider Department Center   2/5/2024  9:15 AM Corby Casey MD [facility]       Allergies:  Review of patient's allergies indicates:   Allergen Reactions    Coreg [carvedilol] Other (See Comments)     Nausea/vomiting    Allopurinol       Other reaction(s): abnormal transaminases       Medications: Review discharge medications with patient and family and provide education.    Current Facility-Administered Medications   Medication Dose Route Frequency Provider Last Rate Last Admin    acetaminophen tablet 650 mg  650 mg Oral Q6H PRN José Miguel Cristina MD        albuterol-ipratropium 2.5 mg-0.5 mg/3 mL nebulizer solution 3 mL  3 mL Nebulization Q6H PRN José Miguel Cristina MD        apixaban tablet 2.5 mg  2.5 mg Oral BID José Miguel Cristina MD   2.5 mg at 01/04/24 2244    aspirin EC tablet 81 mg  81 mg Oral Daily José Miguel Cristina MD   81 mg at 01/04/24 0852    atorvastatin tablet 40 mg  40 mg Oral Daily José Miguel Cristina MD   40 mg at 01/04/24 0852    famotidine tablet 20 mg  20 mg Oral Daily José Miguel Cristina MD   20 mg at 01/04/24 0852    levothyroxine tablet 75 mcg  75 mcg Oral Before breakfast José Miguel Cristina MD   75 mcg at 01/05/24 0539    loperamide capsule 2 mg  2 mg Oral QID PRN José Miguel Cristina MD   2 mg at 01/04/24 1818    melatonin tablet 6 mg  6 mg Oral Nightly José Miguel Cristina MD   6 mg at 01/04/24 2244    mupirocin 2 % ointment   Nasal BID Claude Allan MD   Given at 01/04/24 2245     Facility-Administered Medications Ordered in Other Encounters   Medication Dose Route Frequency Provider Last Rate Last Admin    0.9%  NaCl infusion   Intravenous Continuous Soni Bhatti MD   New Bag at 07/21/23 1116     Current Discharge Medication List        START taking these medications    Details   aspirin (ECOTRIN) 81 MG EC tablet Take 1 tablet (81 mg total) by mouth once daily.  Qty: 90 tablet, Refills: 3      atorvastatin (LIPITOR) 40 MG tablet Take 1 tablet (40 mg total) by mouth once daily.  Qty: 90 tablet, Refills: 0           CONTINUE these medications which have NOT CHANGED    Details   acetaminophen (TYLENOL) 500 MG tablet Take 500 mg by mouth every 6 (six) hours as needed  for Pain.      albuterol-ipratropium (DUO-NEB) 2.5 mg-0.5 mg/3 mL nebulizer solution Take by nebulization.      apixaban (ELIQUIS) 2.5 mg Tab Take 1 tablet (2.5 mg total) by mouth 2 (two) times daily.  Qty: 60 tablet, Refills: 11    Associated Diagnoses: PAF (paroxysmal atrial fibrillation)      bisacodyL (DULCOLAX) 10 mg Supp Place 10 mg rectally.      cabozantinib (CABOMETYX) 60 mg Tab TAKE ONE TABLET BY MOUTH ONCE DAILY AT THE SAME TIME ON AN EMPTY STOMACH AT LEAST 1 HOUR BEFORE OR 2 HOURS AFTER EATING. AVOID GRAPEFRUIT PRODUCTS  Qty: 30 tablet, Refills: 4    Associated Diagnoses: Metastatic renal cell carcinoma, unspecified laterality      cyclobenzaprine (FLEXERIL) 5 MG tablet TAKE 1 TABLET BY MOUTH DAILY AS NEEDED FOR MUSCLE SPASMS.  Qty: 30 tablet, Refills: 1    Associated Diagnoses: Metastatic renal cell carcinoma, unspecified laterality; Hypothyroidism, unspecified type; ESRD (end stage renal disease) on dialysis; Metastatic renal cell carcinoma to intra-abdominal site; Metastatic renal cell carcinoma, left; Anemia due to end stage renal disease; Anemia, unspecified type; S/p nephrectomy      hyoscyamine (LEVSIN/SL) 0.125 mg Subl Place under the tongue.      lanthanum (FOSRENOL) 1000 MG chewable tablet Take 1 tablet by mouth.      levothyroxine (SYNTHROID) 75 MCG tablet Take 1 tablet (75 mcg total) by mouth once daily.  Qty: 30 tablet, Refills: 11    Associated Diagnoses: Hypothyroidism, unspecified type      lidocaine-prilocaine (EMLA) cream APPLY ATLEAST 30 MINUTES BEFORE TREATMENT 3 TIMES A WEEK  Qty: 30 g, Refills: 11    Associated Diagnoses: Hypertensive CKD, ESRD on dialysis      LORAZEPAM INTENSOL 2 mg/mL Conc Take by mouth.      mirtazapine (REMERON) 15 MG tablet Take 15 mg by mouth every evening.      mv,Ca,min-folic acid-vit K1 (ONE-A-DAY WOMEN'S 50 PLUS) 400-20 mcg Tab Take 1 tablet by mouth once daily.      naloxone (NARCAN) 1 mg/mL injection 2 mg (1 mg per nostril) by Nasal route as needed for  opioid overdose; may repeat in 3 to 5 minutes if not effective. Call 911  Qty: 2 mL, Refills: 11      nystatin (MYCOSTATIN) cream SMARTSIG:sparingly Topical 2-4 Times Daily PRN      ondansetron (ZOFRAN-ODT) 4 MG TbDL Take by mouth.      pantoprazole (PROTONIX) 40 MG tablet Take 1 tablet (40 mg total) by mouth 2 (two) times daily.  Qty: 60 tablet, Refills: 11      traMADoL (ULTRAM) 50 mg tablet Take 1 tablet (50 mg total) by mouth every 12 (twelve) hours as needed for Pain.  Qty: 30 each, Refills: 1    Comments: Quantity prescribed more than 7 day supply? Yes, quantity medically necessary  Associated Diagnoses: Metastatic renal cell carcinoma to intra-abdominal site; Metastatic renal cell carcinoma, left           STOP taking these medications       cloNIDine 0.3 mg/24 hr td ptwk (CATAPRES) 0.3 mg/24 hr Comments:   Reason for Stopping:         ENTRESTO 49-51 mg per tablet Comments:   Reason for Stopping:         epoetin david-epbx (RETACRIT INJ) Comments:   Reason for Stopping:         fluticasone propionate (FLONASE) 50 mcg/actuation nasal spray Comments:   Reason for Stopping:         metoprolol succinate (TOPROL-XL) 100 MG 24 hr tablet Comments:   Reason for Stopping:         morphine 100 mg/5 mL (20 mg/mL) concentrated solution Comments:   Reason for Stopping:                 I have seen and examined this patient within the last 30 days. My clinical findings that support the need for the home health skilled services and home bound status are the following:no   Weakness/numbness causing balance and gait disturbance due to Weakness/Debility making it taxing to leave home.     Diet:   renal diet        Referrals/ Consults  Physical Therapy to evaluate and treat. Evaluate for home safety and equipment needs; Establish/upgrade home exercise program. Perform / instruct on therapeutic exercises, gait training, transfer training, and Range of Motion.  Occupational Therapy to evaluate and treat. Evaluate home environment  for safety and equipment needs. Perform/Instruct on transfers, ADL training, ROM, and therapeutic exercises.    Activities:   activity as tolerated    Nursing:   Agency to admit patient within 24 hours of hospital discharge unless specified on physician order or at patient request    SN to complete comprehensive assessment including routine vital signs. Instruct on disease process and s/s of complications to report to MD. Review/verify medication list sent home with the patient at time of discharge  and instruct patient/caregiver as needed. Frequency may be adjusted depending on start of care date.     Skilled nurse to perform up to 3 visits PRN for symptoms related to diagnosis    Notify MD if SBP > 160 or < 90; DBP > 90 or < 50; HR > 120 or < 50; Temp > 101; O2 < 88%; Other:    [facility] to schedule additional visits based on staff availability and patient request on consecutive days within the home health episode.    When multiple disciplines ordered:    Start of Care occurs on Sunday - Wednesday schedule remaining discipline evaluations as ordered on separate consecutive days following the start of care.    Thursday SOC -schedule subsequent evaluations Friday and Monday the following week.     Friday - Saturday SOC - schedule subsequent discipline evaluations on consecutive days starting Monday of the following week.    For all post-discharge communication and subsequent orders please contact patient's primary care physician. If unable to reach primary care physician or do not receive response within 30 minutes, please contact  [facility]  for clinical staff order clarification    Miscellaneous   Routine Skin for Bedridden Patients: Instruct patient/caregiver to apply moisture barrier cream to all skin folds and wet areas in perineal area daily and after baths and all bowel movements.    Home Health Aide:  Nursing every 48 hours, Physical Therapy every 48 hours, Occupational Therapy every 48 hours, and Home  Health Aide every 48 hours    Wound Care Orders    Right lateral leg- Chronic ulcer with small amount yellow drainage.   Treatment/Plan:  Continue local wound care to right lateral leg with Mepilex foam. Change dressing twice a week and prn  Local wound care to perineum every 2 days  and prn- Cleanse with Remedy Foaming cleanser and apply thin coat of Remedy Zinc barrier (orange top tube)    I certify that this patient is confined to her home and needs intermittent skilled nursing care, physical therapy, and occupational therapy.

## 2024-01-05 NOTE — PROGRESS NOTES
[facility]  Palliative Medicine  Progress Note    Patient Name: Eva Bloom  MRN: [id number]  Admission Date: 1/2/2024  Hospital Length of Stay: 3 days  Code Status: Full Code   Attending Provider: José Miguel Cristina, *  Consulting Provider: Leila Munoz NP  Primary Care Physician: Sowmya Mccain MD  Principal Problem:CVA (cerebral vascular accident)    Patient information was obtained from patient, spouse/SO, past medical records, ER records, and primary team.      Assessment/Plan:   Advance Care Planning     Palliative Care  ACP (advance care planning)  1/5/2023  - interval chart reviewed; discussed pt with MDT; plan for discharge home today with HH and continued HD   - met with pt at bedside;pt sitting in bedside chair, calm, and much improved mental status; oriented to person and place, provided 's name, confirms remembering me from prior admits; excited to be going home today   - family not at bedside; call  Otis by phone; discussed recommendation for continued palliative involvement outside of acute hospital setting, especially for continued GOC/ACP and code status discussion involving all family members outside the increased stress of hospital admission; Otis feels this is a great plan and is open to transitioning to [facility]'s palliative program as they were under their hospice program before; will also allow for continuity of care if Hospice aligns with GOC in the future   - emotional support provided; allowed time for questions/concerns all addressed   - updated Isabelle ROBB RN of this plan and placed order for referral     1/4/2024 - pt at HD and no family at bedside during time of visit.  Have not yet heard back from pt's sister in law.     1/3/2024 - Consult   - consult received; interval chart reviewed in detail; discussed pt with hospital primary, Dr. Ramires   - pt known to myself and palliative medicine team from previous admission; pt previously on hospice  "with [facility] and revoked 12/15 for wishes of aggressive treatment in regards to HD   - met with patient and  Otis at bedside; introduction to palliative medicine team and role in current care and admission; pt not able to participate in discussions due to AMS   - learned more about pt and ongoing care since prior admission;  reports pt has been doing overall well/better since prior admission until recently; continues to tolerate HD with no issues; but started experiencing decline/AMS prior to admission which prompted ED visit   -  explained hospice d/c due to wishing to continue with HD as pt is tolerating well; he stated that if pt were to ever not tolerate HD he would be more agreeable to stopping HD, but wishes for her to continue it at this time;  he has good insight that death would be imminent if HD were to be stopped   - GOC/ACP discussion; focus of discussion on current full code status, as pt was previously DNR last 2 admissions and completed DNR LAPOST with hospice  - in depth discussion about pt's current health status and end stage nature of multiple of pt's conditions ; explained difference between DNR and do not treat as this was a prior concern for pt,  and sister in law Karis in the past; strong recommendation for DNR as pt would not tolerate CPR/intubation well, nor would it solve any of pt's multiple end stage conditions;  seems to have good insight into this (like he did in prior admission when pt was DNR),  even grimacing and moving hands to face when discussing potential for CPR, stating "it would crush her"; I supported these thoughts and feelings  - Otis asked that I call his sister/pt's sister in law, Karis, to discuss with her as well as he was not comfortable changing code status without her input as she is an RN; I recall Karis advocating for full code in prior admission as well; I attempted to call Karis following visit, she " requested to call my back at a later time as she was working on a report   - plan for continue discussion of recommendation for DNR and encouragement that pt can still receive full treatment with DNR code status   - hospital  consulted and following pt/family   - emotional support provided   - Allowed time for questions/concerns; all addressed; expressed availability of myself/palliative team for additional questions/concerns     Neuro  * CVA (cerebral vascular accident)  - pt presented with significant AMS   - CTA head: : Focal calcification along the expected left proximal M2 segment of the MCA concerning for nonocclusive calcified embolus   - neurology consulted/following; EEG pending   - pt moved all extremities during my assessment, but remains with  -  with good insight into probability of stroke; discussed correlation of metastatic cancer, a fib, and strokes; correlation between metastatic/end stage cancer and clots/stroke did seem some what new info for   - plan for continued discussion following CVA work up and input from neurology   - continued management per hospital primary and neurology     Acute metabolic encephalopathy  - history of AMS in prior admissions, but typically improved to baseline   -  reports significant decline/changes in the past few days, markedly prior to admit   - pt continues to be disoriented, does not answer questions appropriately, somewhat restless in bed (moving all 4 extremities; does make eye contact when attempting to speak with her  - potential CVA; workup ongoing see CVA   - pt currently does not have capacity for decision making   - continued management per hospital primary      Cardiac/Vascular  PAF (paroxysmal atrial fibrillation)  - chronic history noted; on eliquis (was discontinued during hospice care)   - with AMS/restlessness increased risk of falls is concern with eliquis; sitter at bedside currently and family plans to be present at  bedside as well   - also potential contributing factor with CVA,    Chronic combined systolic and diastolic congestive heart failure  - chronic condition noted  - pt with AICD in place   - 5/23 is most recent echo on file with EF 23% at that time and Grade II left ventricular diastolic dysfunction, and pulm HTN   - contributes to overall poor condition/prognosis and contributes to pt's appropriateness for hospice but does not align with GOC at this time   - continued management per hospital primary     Renal/  ESRD (end stage renal disease) on dialysis  - on HD   - revoked hospice 12/15 with wishes to continue HD  -  wishes to continued HD as long as pt continues to tolerate it well   - continued management per primary     S/p nephrectomy left  - chronic history noted  - current HD patient     Oncology  Metastatic renal cell carcinoma to lung, unspecified laterality  - chronic history noted   - sees Dr. Rivera ([facility]ology) as OP with recent 11/23 follow up post discharge last admission; no longer candidate for any type of onc treatment;  with good insight into this although he shares difficulty accepting the information   - likely contributing factor to CVA/future CVA risk which was dicussed with  (see CVA)   - also contributes to appropriateness for hospice, but does not align with GOC   - continued management per hospital primary     History of kidney cancer  - see nephrectomy , ESRD, and metastatic cancer     Endocrine  Severe protein-calorie malnutrition  - pt with ongoing severe protein calorie malnutrition  - weight: 42.6kg/94lb and BMI 16.14  -  reports some PO intake improvements since prior discharge with decrease with AMS   - contributes to appropriateness for hospice, but does not align with current GOC   - if GOC continue to be for full treatment consider restarting mirtazapine and nutrition consult when safe for PO intake   - continued  management per hospital primary  "    I will follow-up with patient. Please contact us if you have any additional questions.    Total visit time: 55 minutes    35 min visit time including: face to face time in discussion of symptom assessment, and exploring options and burdens of offered treatments.  This also includes non-face to face time preparing to see the patient including chart review, obtaining and/or reviewing separately obtained history, documenting clinical information in the electronic or other health record, independently interpreting results and communicating results to the patient/family/caregiver, family discussions by phone if not able to be present, coordination of care with other specialists, and discharge planning.     20 min ACP time spent: goals of care, advanced care planning, emotional support, formulating and communicating prognosis, exploring burden/ benefit of various approaches of treatment.     Leila Munoz NP  Palliative Medicine  [facility]    Subjective:     Chief Complaint:   Chief Complaint   Patient presents with    Altered Mental Status     Pt presents to the ED with her  who reports pt has been confused, talking to herself, and sleeping more x a few days. Pt's  reports pt goes to dialysis T/Th/Sat, finished dialysis tx just PTA. Pt not answering questions, falling asleep in triage.        HPI:   From H&P: "57 yo F with a PMHx of CHF with EF of 23%, ESRD on dialysis (TTS), Afib on OAC, AICD placement (currently disconnected per ), RCC with metastatic disease to lung and pancreas, presenting to the ED BIB  for altered mental status. History is provided by independent historian, pt's , reports for the last few days, she has been confused, intermittently falling asleep, experiencing auditory hallucinations, and talking to herself, and experiencing dark stools.  took her to dialysis this morning, was able to receive full session but brought her " "straight here afterwards for further evaluation. She currently has a port a cath to her chest wall for dialysis, and is anuric, no recent missed sessions. Her nephrologist is Dr Bender.  reports she ambulates w/ a walker, is able to hold a full conversation and answer questions at baseline. She has been able to recognize him the last few days.  also reports she received a dose of the COVID vaccine around 4 days ago.   In ER patient is febrile 100.8,chest X ray with no sign of pneumonia,Covid and flu are negative,patient received broad spectrum IV Abx,not able get urine,not urinating,blood culture is done,her HH is stable,head CT suspect for CVA,neurology is consulted and patient will have MRI brain and EEG."     Palliative medicine consulted for goals of care discussion and advance care planning; for details of visit, see advance care planning section of plan.     Hospital Course:  No notes on file    Medications:  Continuous Infusions:  Scheduled Meds:   apixaban  2.5 mg Oral BID    aspirin  81 mg Oral Daily    atorvastatin  40 mg Oral Daily    famotidine  20 mg Oral Daily    levothyroxine  75 mcg Oral Before breakfast    mupirocin   Nasal BID     PRN Meds:acetaminophen, albuterol-ipratropium, loperamide    Objective:     Vital Signs (Most Recent):  Temp: 97.7 °F (36.5 °C) (01/04/24 0750)  Pulse: (!) 111 (01/04/24 0750)  Resp: 18 (01/04/24 0750)  BP: 120/87 (01/04/24 0750)  SpO2: 100 % (01/04/24 0750) Vital Signs (24h Range):  Temp:  [97.5 °F (36.4 °C)-98.7 °F (37.1 °C)] 97.7 °F (36.5 °C)  Pulse:  [103-111] 111  Resp:  [18-19] 18  SpO2:  [91 %-100 %] 100 %  BP: (108-134)/(77-87) 120/87     Weight: 42.6 kg (94 lb)  Body mass index is 16.14 kg/m².       Physical Exam  Vitals and nursing note reviewed.   Constitutional:       General: She is awake.      Appearance: She is cachectic.      Comments: Sitting in bedside chair, calm, no signs of discomfort    HENT:      Head: Atraumatic.      Comments: Ranjan " temporal wasting      Mouth/Throat:      Mouth: Mucous membranes are dry.   Pulmonary:      Effort: Pulmonary effort is normal. No respiratory distress.   Musculoskeletal:      Comments: Muscular wasting to all extremities    Skin:     Comments: Clean intact dressings to bilateral heels    Neurological:      Mental Status: She is alert.      Comments: Oriented to person and place    Psychiatric:         Behavior: Behavior is cooperative.     Advance Care Planning  Advance Directives:   Living Will: No    [place]: Yes (prior DNR LAPOST signed with Hospice)    Do Not Resuscitate Status: No (previously DNR with current wishes for full code; currently discussing with family with some percieved confusion with DNR vs do not treat;  with good insight that CPR would be harmful to pt at this time and in current state)    Medical Power of : No    Goals of Care: What is most important right now is to focus on spending time at home, avoiding the hospital, remaining as independent as possible, quality of life, even if it means sacrificing a little time, improvement in condition but with limits to invasive therapies, comfort and QOL . Accordingly, we have decided that the best plan to meet the patient's goals includes continuing with treatment.     Significant Labs: All pertinent labs within the past 24 hours have been reviewed.  CBC:   Recent Labs   Lab 01/04/24  0430   WBC 6.18   HGB 12.6   HCT 43.8   MCV 88        BMP:  Recent Labs   Lab 01/04/24  0430   GLU 75      K 4.5      CO2 18*   BUN 45*   CREATININE 6.9*   CALCIUM 10.3     LFT:  Lab Results   Component Value Date    AST 63 (H) 01/02/2024     (H) 10/12/2006    ALKPHOS 606 (H) 01/02/2024    BILITOT 1.9 (H) 01/02/2024     Albumin:   Albumin   Date Value Ref Range Status   01/02/2024 3.1 (L) 3.5 - 5.2 g/dL Final     Protein:   Total Protein   Date Value Ref Range Status   01/02/2024 9.5 (H) 6.0 - 8.4 g/dL Final     Lactic acid:   Lab  Results   Component Value Date    LACTATE 2.9 (H) 01/02/2024    LACTATE 1.6 10/16/2023     Significant Imaging: I have reviewed all pertinent imaging results/findings within the past 24 hours.    Leila Munoz NP  Palliative Medicine  [facility]

## 2024-01-05 NOTE — PLAN OF CARE
Problem: Adult Inpatient Plan of Care  Goal: Plan of Care Review  Outcome: Ongoing, Progressing  Goal: Patient-Specific Goal (Individualized)  Outcome: Ongoing, Progressing  Goal: Optimal Comfort and Wellbeing  Outcome: Ongoing, Progressing  Goal: Readiness for Transition of Care  Outcome: Ongoing, Progressing     Problem: Coping Ineffective  Goal: Effective Coping  Outcome: Ongoing, Progressing     Problem: Infection  Goal: Absence of Infection Signs and Symptoms  Outcome: Ongoing, Progressing     Problem: Impaired Wound Healing  Goal: Optimal Wound Healing  Outcome: Ongoing, Progressing     Problem: Fall Injury Risk  Goal: Absence of Fall and Fall-Related Injury  Outcome: Ongoing, Progressing     Problem: Skin Injury Risk Increased  Goal: Skin Health and Integrity  Outcome: Ongoing, Progressing

## 2024-01-05 NOTE — PLAN OF CARE
01/05/24 1020   Medicare Message   Important Message from Medicare regarding Discharge Appeal Rights Given to patient/caregiver;Other (comments);Explained to patient/caregiver;Signed/date by patient/caregiver   Date IMM was signed 01/05/24   Time IMM was signed 1020     Copy left in patient's room for spouse as requested.

## 2024-01-05 NOTE — DISCHARGE SUMMARY
[facility]  Discharge Summary      Patient Name: Eva Bloom  MRN: [id number]  Banner: [id number]  Patient Class: IP- Inpatient  Admission Date: 1/2/2024  Hospital Length of Stay: 3 days  Discharge Date and Time:  01/05/2024 11:56 AM  Attending Physician: José Miguel Cristina, *   Discharging Provider: José Miguel Cristina MD  Primary Care Provider: Sowmya Mccain MD    Primary Care Team: Networked reference to record PCT     HPI:   59 yo F with a PMHx of CHF with EF of 23%, ESRD on dialysis (TTS), Afib on OAC, AICD placement (currently disconnected per ), RCC with metastatic disease to lung and pancreas, presenting to the ED BIB  for altered mental status. History is provided by independent historian, pt's , reports for the last few days, she has been confused, intermittently falling asleep, experiencing auditory hallucinations, and talking to herself, and experiencing dark stools.  took her to dialysis this morning, was able to receive full session but brought her straight here afterwards for further evaluation. She currently has a port a cath to her chest wall for dialysis, and is anuric, no recent missed sessions. Her nephrologist is Dr Bender.  reports she ambulates w/ a walker, is able to hold a full conversation and answer questions at baseline. She has been able to recognize him the last few days.  also reports she received a dose of the COVID vaccine around 4 days ago.   In ER patient is febrile 100.8,chest X ray with no sign of pneumonia,Covid and flu are negative,patient received broad spectrum IV Abx,not able get urine,not urinating,blood culture is done,her HH is stable,head CT suspect for CVA,neurology is consulted and patient will have MRI brain and EEG.    * No surgery found *      Hospital Course:   59 yo F with a PMHx of CHF with EF of 23%, ESRD on dialysis (TTS), Afib on OAC, AICD placement (currently disconnected  per ), RCC with metastatic disease to lung and pancreas, presenting to the ED BIB  for altered mental status. She currently has a port a cath to her chest wall for dialysis, and is anuric, no recent missed sessions.  also reports she received a dose of the COVID vaccine around 4 days ago.   In ER patient is febrile 100.8,chest X ray with no sign of pneumonia,Covid and flu are negative,patient received broad spectrum IV Abx,not able get urine,not urinating,blood culture is done,her HH is stable,head CT suspect for CVA,neurology is consulted ,can not have MRI duo to AICD,  CTA more consistent with CVA,was on ASA,statin and  PT,OT,ST is consulted.  In next day was Much more alert ,did well with PT,OT.  Patient had her routine HD by nephrology.  At DC time patient was  alert time 3,patient was discharged home with ASA,statin and HH and follow up with PCP as out patient.     Goals of Care Treatment Preferences:  Code Status: Full Code          What is most important right now is to focus on spending time at home, avoiding the hospital, remaining as independent as possible, quality of life, even if it means sacrificing a little time, improvement in condition but with limits to invasive therapies, comfort and QOL .  Accordingly, we have decided that the best plan to meet the patient's goals includes continuing with treatment, enrolling in hospice care.      Consults:   Consults (From admission, onward)          Status Ordering Provider     Inpatient consult to Palliative Care  Once        Provider:  Leila Munoz NP    Completed AKTOSIN, ROBERTOLAZIM     Inpatient consult to Spiritual Care  Once        Provider:  (Not yet assigned)    Completed AKHOCHRISTIEDEH, ABDOLAZIM     Inpatient consult to Nephrology  Once        Provider:  Claude Allan MD    Acknowledged ROBERT ROLDANOLANASIM     Inpatient consult to Neurology  Once        Provider:  Morgan Song MD    Acknowledged ANASTASIA  TORIN SANDOVAL            No new Assessment & Plan notes have been filed under this hospital service since the last note was generated.  Service: Hospital Medicine    Final Active Diagnoses:    Diagnosis Date Noted POA    PRINCIPAL PROBLEM:  CVA (cerebral vascular accident) [I63.9] 01/03/2024 Yes    Acute metabolic encephalopathy [G93.41] 01/02/2024 Yes    Incontinence associated dermatitis [L25.8, R32] 01/04/2024 Yes    Severe protein-calorie malnutrition [E43] 10/11/2023 Yes    ACP (advance care planning) [Z71.89] 10/11/2023 Not Applicable    Body mass index (BMI) less than 19 [Z68.1] 09/28/2023 Not Applicable    Metastatic renal cell carcinoma to lung, unspecified laterality [C78.00, C64.9] 08/11/2023 Yes    Hypothyroidism [E03.9] 07/19/2023 Yes     Chronic    Aortic atherosclerosis [I70.0] 07/06/2023 Yes    PAF (paroxysmal atrial fibrillation) [I48.0] 07/25/2022 Yes     Chronic    Anemia in ESRD (end-stage renal disease) [N18.6, D63.1]  Yes     Chronic    Chronic combined systolic and diastolic congestive heart failure [I50.42]  Yes    ESRD (end stage renal disease) on dialysis [N18.6, Z99.2]  Not Applicable     Chronic    Cardiomyopathy, nonischemic [I42.8] 07/21/2017 Yes     Chronic    Pulmonary hypertension [I27.20] 07/21/2017 Yes    S/p nephrectomy left [Z90.5] 10/07/2016 Not Applicable     Chronic    History of kidney cancer [Z85.528] 09/02/2016 Not Applicable    Essential hypertension [I10] 09/23/2015 Yes     Chronic      Problems Resolved During this Admission:       Discharged Condition: stable    Disposition: [facility]-Health Care [facility]    Follow Up:   Follow-up Information       Sowmya Mccain MD Follow up.    Specialties: Internal Medicine, Pediatrics  Why: Will see Dr. Ross January 11, 2024 at 3:30 PM  Contact information:  [address] [place] [address]  [phone]               [place] - Follow up on 1/6/2024.    Specialty: Home Health Services  Why: Home Health  Contact  information:  [address]  [address]  [place] [address]  [phone]               [facility] Follow up on 1/6/2024.    Why: out patient services  Dialysis [phone]  Contact information:  [address]  [facility] [address]             [facility] Follow up.    Why: will have NP palliative nurse  Contact information:  [address]  [facility] [address] [phone]                         Patient Instructions:      Ambulatory referral/consult to Hospice   Standing Status: Future   Referral Priority: Routine Referral Type: Consultation   Referral Reason: Specialty Services Required   Requested Specialty: Hospice and Palliative Medicine   Number of Visits Requested: 1     Activity as tolerated       Significant Diagnostic Studies: Labs: BMP:   Recent Labs   Lab 01/04/24  0430   GLU 75      K 4.5      CO2 18*   BUN 45*   CREATININE 6.9*   CALCIUM 10.3   , CMP   Recent Labs   Lab 01/04/24  0430      K 4.5      CO2 18*   GLU 75   BUN 45*   CREATININE 6.9*   CALCIUM 10.3   ANIONGAP 16   , and CBC   Recent Labs   Lab 01/04/24  0430   WBC 6.18   HGB 12.6   HCT 43.8        Microbiology: Blood Culture   Lab Results   Component Value Date    LABBLOO No Growth to date 01/02/2024    LABBLOO No Growth to date 01/02/2024    LABBLOO No Growth to date 01/02/2024     Radiology: X-Ray: CXR: X-Ray Chest 1 View (CXR): No results found for this visit on 01/02/24. and X-Ray Chest PA and Lateral (CXR): No results found for this visit on 01/02/24.  CT scan: CTA head,neck     Pending Diagnostic Studies:       None           Medications:  Reconciled Home Medications:      Medication List        START taking these medications      aspirin 81 MG EC tablet  Commonly known as: ECOTRIN  Take 1 tablet (81 mg total) by mouth once daily.     atorvastatin 40 MG tablet  Commonly known as: LIPITOR  Take 1 tablet (40 mg  total) by mouth once daily.            CONTINUE taking these medications      acetaminophen 500 MG tablet  Commonly known as: TYLENOL  Take 500 mg by mouth every 6 (six) hours as needed for Pain.     albuterol-ipratropium 2.5 mg-0.5 mg/3 mL nebulizer solution  Commonly known as: DUO-NEB  Take by nebulization.     apixaban 2.5 mg Tab  Commonly known as: ELIQUIS  Take 1 tablet (2.5 mg total) by mouth 2 (two) times daily.     bisacodyL 10 mg Supp  Commonly known as: DULCOLAX  Place 10 mg rectally.     CABOMETYX 60 mg Tab  Generic drug: cabozantinib  TAKE ONE TABLET BY MOUTH ONCE DAILY AT THE SAME TIME ON AN EMPTY STOMACH AT LEAST 1 HOUR BEFORE OR 2 HOURS AFTER EATING. AVOID GRAPEFRUIT PRODUCTS     cyclobenzaprine 5 MG tablet  Commonly known as: FLEXERIL  TAKE 1 TABLET BY MOUTH DAILY AS NEEDED FOR MUSCLE SPASMS.     FosrenoL 1000 MG chewable tablet  Generic drug: lanthanum  Take 1 tablet by mouth.     hyoscyamine 0.125 mg Subl  Commonly known as: LEVSIN/SL  Place under the tongue.     levothyroxine 75 MCG tablet  Commonly known as: SYNTHROID  Take 1 tablet (75 mcg total) by mouth once daily.     LIDOcaine-prilocaine cream  Commonly known as: EMLA  APPLY ATLEAST 30 MINUTES BEFORE TREATMENT 3 TIMES A WEEK     LORazepam IntensoL 2 mg/mL Conc  Generic drug: LORazepam  Take by mouth.     mirtazapine 15 MG tablet  Commonly known as: REMERON  Take 15 mg by mouth every evening.     naloxone 1 mg/mL injection  Commonly known as: NARCAN  2 mg (1 mg per nostril) by Nasal route as needed for opioid overdose; may repeat in 3 to 5 minutes if not effective. Call 911     nystatin cream  Commonly known as: MYCOSTATIN  SMARTSIG:sparingly Topical 2-4 Times Daily PRN     ondansetron 4 MG Tbdl  Commonly known as: ZOFRAN-ODT  Take by mouth.     ONE-A-DAY WOMEN'S 50 PLUS 400-20 mcg Tab  Generic drug: mv,Ca,min-folic acid-vit K1  Take 1 tablet by mouth once daily.     pantoprazole 40 MG tablet  Commonly known as: PROTONIX  Take 1 tablet (40 mg  total) by mouth 2 (two) times daily.     traMADoL 50 mg tablet  Commonly known as: ULTRAM  Take 1 tablet (50 mg total) by mouth every 12 (twelve) hours as needed for Pain.            STOP taking these medications      cloNIDine 0.3 mg/24 hr td ptwk 0.3 mg/24 hr  Commonly known as: CATAPRES     ENTRESTO 49-51 mg per tablet  Generic drug: sacubitriL-valsartan     fluticasone propionate 50 mcg/actuation nasal spray  Commonly known as: FLONASE     metoprolol succinate 100 MG 24 hr tablet  Commonly known as: TOPROL-XL     morphine 100 mg/5 mL (20 mg/mL) concentrated solution     RETACRIT INJ              Indwelling Lines/Drains at time of discharge:   Lines/Drains/Airways       Central Venous Catheter Line  Duration                  Hemodialysis Catheter left subclavian -- days                    Time spent on the discharge of patient:  over 30  minutes         José Miguel Cristina MD  Department of Hospital Medicine  [facility] - Med Surg

## 2024-01-05 NOTE — PLAN OF CARE
Problem: Adult Inpatient Plan of Care  Goal: Plan of Care Review  Outcome: Adequate for Care Transition  Goal: Patient-Specific Goal (Individualized)  Outcome: Adequate for Care Transition  Goal: Absence of Hospital-Acquired Illness or Injury  Outcome: Adequate for Care Transition  Goal: Optimal Comfort and Wellbeing  Outcome: Adequate for Care Transition  Goal: Readiness for Transition of Care  Outcome: Adequate for Care Transition     Problem: Coping Ineffective  Goal: Effective Coping  Outcome: Adequate for Care Transition     Problem: Device-Related Complication Risk (Hemodialysis)  Goal: Safe, Effective Therapy Delivery  Outcome: Adequate for Care Transition     Problem: Hemodynamic Instability (Hemodialysis)  Goal: Effective Tissue Perfusion  Outcome: Adequate for Care Transition     Problem: Infection (Hemodialysis)  Goal: Absence of Infection Signs and Symptoms  Outcome: Adequate for Care Transition     Problem: Adjustment to Illness (Sepsis/Septic Shock)  Goal: Optimal Coping  Outcome: Adequate for Care Transition     Problem: Bleeding (Sepsis/Septic Shock)  Goal: Absence of Bleeding  Outcome: Adequate for Care Transition     Problem: Glycemic Control Impaired (Sepsis/Septic Shock)  Goal: Blood Glucose Level Within Desired Range  Outcome: Adequate for Care Transition     Problem: Infection Progression (Sepsis/Septic Shock)  Goal: Absence of Infection Signs and Symptoms  Outcome: Adequate for Care Transition     Problem: Nutrition Impaired (Sepsis/Septic Shock)  Goal: Optimal Nutrition Intake  Outcome: Adequate for Care Transition     Problem: Infection  Goal: Absence of Infection Signs and Symptoms  Outcome: Adequate for Care Transition     Problem: Impaired Wound Healing  Goal: Optimal Wound Healing  Outcome: Adequate for Care Transition     Problem: Fall Injury Risk  Goal: Absence of Fall and Fall-Related Injury  Outcome: Adequate for Care Transition     Problem: Skin Injury Risk Increased  Goal: Skin  Health and Integrity  Outcome: Adequate for Care Transition

## 2024-01-05 NOTE — PLAN OF CARE
TN sent a secure message to med surg nurse Sharifa that his patient is cleared for discharge from case management's viewpoint.[facility] and NP with [facility].   01/05/24 1141   Final Note   Assessment Type Final Discharge Note   Anticipated Discharge Disposition Home   What phone number can be called within the next 1-3 days to see how you are doing after discharge?   (see chart)   Hospital Resources/Appts/Education Provided Provided patient/caregiver with written discharge plan information;Appointments scheduled and added to AVS   Post-Acute Status   Post-Acute Authorization Home Health   Home Health Status Set-up Complete/Auth obtained   Coverage medicare   Patient choice form signed by patient/caregiver List with quality metrics by geographic area provided   Discharge Delays None known at this time

## 2024-01-06 LAB
BACTERIA BLD CULT: NORMAL
BACTERIA BLD CULT: NORMAL

## 2024-01-08 ENCOUNTER — PATIENT OUTREACH (OUTPATIENT)
Dept: ADMINISTRATIVE | Facility: CLINIC | Age: 59
End: 2024-01-08
Payer: MEDICARE

## 2024-01-08 ENCOUNTER — PES CALL (OUTPATIENT)
Dept: HOME HEALTH SERVICES | Facility: CLINIC | Age: 59
End: 2024-01-08
Payer: MEDICARE

## 2024-01-08 NOTE — PROGRESS NOTES
C3 nurse spoke with Eva Bloom  for a TCC post hospital discharge follow up call. The patient has a scheduled HOSFU appointment with WOOD Ross on 01/11/2024 @ 0140.    Message sent to MD staff.

## 2024-01-08 NOTE — PROGRESS NOTES
C3 nurse attempted to contact patient. The following occurred:   C3 nurse attempted to contact Eva Bloom ( )  for a TCC post hospital discharge follow up call. The patient is unable to conduct the call @ this time. The patient requested a callback.    The patient has a scheduled HOSFU appointment with WOOD urbano on 01/11/2024 @ 1500. Message sent to Physician staff.

## 2024-01-10 ENCOUNTER — TELEPHONE (OUTPATIENT)
Dept: HEMATOLOGY/ONCOLOGY | Facility: CLINIC | Age: 59
End: 2024-01-10
Payer: MEDICARE

## 2024-01-10 NOTE — PHYSICIAN QUERY
PT Name: Eva Bloom  MR #: [id number]     DOCUMENTATION CLARIFICATION     CDS/: Mariia Lowe RN BSN              Contact information: [email or web address]  This form is a permanent document in the medical record.     Query Date: January 10, 2024    By submitting this query, we are merely seeking further clarification of documentation.  Please utilize your independent clinical judgment when addressing the question(s) below.  Provider, please clarify the integumentary diagnosis and POA status related to the documentation of Perineum:   The Medical Record contains the following:    DARRYL FITZPATRICK CNS, 1/4      Consulted by nursing for altered skin integrity to gluteal folds-excoriated  A 58 year old female admitted 1/2/24 from home with confusion, talking to herself, and sleeping more for a few days.   1/4 WBC 6.18 Hgb 12.6 Hct 43.8   1/2 Alb 3.1 Weight 94 lbs  On Isoflex mattress; Marcelino score 15  Assessment:  Perineum severely denuded. Having incontinent liquid stool.   No pressure injury sacrum.     Treatment/Plan:  Continue local wound care to right lateral leg with Mepilex foam. Change dressing twice a week and prn  Local wound care to perineum every shift and prn- Cleanse with Remedy Foaming cleanser and apply thin coat of Remedy Zinc barrier (orange top tube)  Avoid adult brief. Discussed with family and nursing.  Recommendations made to primary team. Orders placed.      HM, PN, Plan of care note 1/5  Active Hospital Problems  Diagnosis                POA  Incontinence associated dermatitis  Yes    Miscellaneous   Routine Skin for Bedridden Patients: Instruct patient/caregiver to apply moisture barrier cream to all skin folds and wet areas in perineal area daily and after baths and all bowel movements.     Home Health Aide:  Nursing every 48 hours, Physical Therapy every 48 hours, Occupational Therapy every 48 hours, and Home Health Aide every 48 hours    Wound Care Orders     Right lateral leg-  Chronic ulcer with small amount yellow drainage.   Treatment/Plan:  Continue local wound care to right lateral leg with Mepilex foam. Change dressing twice a week and prn  Local wound care to perineum every 2 days  and prn- Cleanse with Remedy Foaming cleanser and apply thin coat of Remedy Zinc barrier (orange top tube)        LDA Altered Skin Integrity 08/25/23 1332 medial Perineum Incontinence associated dermatitis               The clinical guidelines noted are only a system guideline. It does not replace the providers clinical judgment.    Per the National Pressure Injury Advisory Panel:   A pressure injury is localized damage to the skin and underlying soft tissue usually over a bony prominence or related to a medical or other device. The injury can present as intact skin or an open ulcer and may be painful. The injury occurs as a result of intense and/or prolonged pressure or pressure in combination with shear. The tolerance of soft tissue for pressure and shear may also be affected by microclimate, nutrition, perfusion, co-morbidities and condition of the soft tissue.       Stage 1 Pressure Injury:  Intact skin with a localized area of non-blanchable erythema, which may appear differently in darkly pigmented skin. Color changes do not include purple or maroon discoloration; these may indicate deep tissue pressure injury.    Stage 2 Pressure Injury:  Partial-thickness loss of skin with exposed dermis. The wound bed is viable, pink or red, moist, and may also present as an intact or ruptured serum-filled blister.    Stage 3 Pressure Injury:  Full-thickness loss of skin, in which adipose (fat) is visible in the ulcer and granulation tissue and epibole (rolled wound edges) are often present. Slough and/or eschar may be visible. Undermining and tunneling may occur.    Stage 4 Pressure Injury:  Full-thickness skin and tissue loss with exposed or directly palpable fascia, muscle, tendon, ligament, cartilage or  bone in the ulcer. Slough and/or eschar may be visible. Epibole (rolled edges), undermining and/or tunneling often occur.    Unstageable Pressure Injury:  Full-thickness skin and tissue loss in which the extent of tissue damage within the ulcer cannot be confirmed because it is obscured by slough or eschar. If slough or eschar is removed, a Stage 3 or Stage 4 pressure injury will be revealed.    Deep Tissue Pressure Injury:  Intact or non-intact skin with localized area of persistent non-blanchable deep red, maroon, purple discoloration or epidermal separation revealing a dark wound bed or blood filled blister. This injury results from intense and/or prolonged pressure and shear forces at the bone-muscle interface. The wound may evolve rapidly to reveal the actual extent of tissue injury, or may resolve without tissue loss. If necrotic tissue, subcutaneous tissue, granulation tissue, fascia, muscle or other underlying structures are visible, this indicates a full thickness pressure injury (Unstageable, Stage 3 or Stage 4). Do not use DTPI to describe vascular, traumatic, neuropathic, or dermatologic conditions.   Medical Device Related Pressure Injury: This describes an etiology. Medical device related pressure injuries result from the use of devices designed and applied for diagnostic or therapeutic purposes. The resultant pressure injury generally conforms to the pattern or shape of the device. The injury should be staged using the staging system.    Mucosal Membrane Pressure Injury: Mucosal membrane pressure injury is found on mucous membranes with a history of a medical device in use at the location of the injury. Due to the anatomy of the tissue these ulcers cannot be staged.       Provider, please clarify the integumentary diagnosis and POA status related to the documentation of Perineum:      [   ] Moisture associated dermatitis due to Fecal, Urinary, or Dual Incontinence - present on admission     [  x ]  Moisture associated dermatitis due to Friction or Contact with body fluids, unspecified - present on admission     [   ] Moisture associated dermatitis due to Fecal, Urinary, or Dual Incontinence -Not present on admission     [   ] Moisture associated dermatitis due to Friction or Contact with body fluids, unspecified - Not present on admission     [   ] Other Integumentary Diagnosis (please specify):______________         Please document in your progress notes daily for the duration of treatment until resolved and include in your discharge summary.    Reference:    LORI Bell., CANDIDO Michael., Goldberg, M., LORI Candelaria, LORI Moss, & JESUS MANUEL Reed. (2016). Revised National Pressure Ulcer Advisory Panel Pressure Injury Staging System: Revised Pressure Injury Staging System. J Wound Ostomy Continence Nurs, 43(6), 585-597. doi:10.1097/won.4531331994691163    Form No.[id number]

## 2024-01-10 NOTE — TELEPHONE ENCOUNTER
----- Message from Renetta Santa sent at 1/10/2024  2:37 PM CST -----  Regarding: medication  Contact: Stacey sharma/ [facility]  Stacey sharma/ [facility] requesting to speak w/ you regarding Pt Medication,( cabozantinib (CABOMETYX) 60 mg Tab,...  Would Like to know if Pt's Still on Medication    Please call to advise    Phone [phone]    Thank You

## 2024-01-10 NOTE — PHYSICIAN QUERY
PT Name: Eva Bloom  MR #: [id number]    DOCUMENTATION CLARIFICATION     CDS/: Mariia Lowe RN BSN               Contact information:  [email or web address]    This form is a permanent document in the medical record.     Query Date: January 10, 2024    By submitting this query, we are merely seeking further clarification of documentation. Please utilize your independent clinical judgment when addressing the question(s) below.    The Medical Record contains the following:   Indicator   Supporting Clinical Findings Location in Medical Record   x Ulcer/Injury Right lateral leg- Chronic ulcer with small amount yellow drainage.     Altered Skin Integrity 11/01/23 2027 Right anterior Thigh WOCN G. Pranav [facility], 1/4    LDA      x Wound Care Consult Right lateral leg- Chronic ulcer with small amount yellow drainage.     Consulted by nursing for altered skin integrity to gluteal folds-excoriated  A 58 year old female admitted 1/2/24 from home with confusion, talking to herself, and sleeping more for a few days.   1/4 WBC 6.18 Hgb 12.6 Hct 43.8   1/2 Alb 3.1 Weight 94 lbs  On Isoflex mattress; Marcelino score 15  Assessment:  Perineum severely denuded. Having incontinent liquid stool.   No pressure injury sacrum.            WOCN [facility]. Pr[facility], 1/4    Radiology Findings     x Acute/Chronic Illness PMHx of CHF with EF of 23%, ESRD on dialysis (TTS), Afib on OAC, AICD placement (currently disconnected per ), RCC with metastatic disease to lung and pancreas   Acute metabolic encephalopathy   Severe protein-calorie malnutrition   Aortic atherosclerosis  H&P, 1/2   x Medication/Treatment Treatment/Plan:  Continue local wound care to right lateral leg with Mepilex foam. Change dressing twice a week and prn  Local wound care to perineum every shift and prn- Cleanse with Remedy Foaming cleanser and apply thin coat of Remedy Zinc barrier (orange top tube)  Avoid adult brief. Discussed with family and  nursing.  Recommendations made to primary team. Orders placed.     Nutrition consulted. Most recent weight and BMI monitored  NANETTE Rock [facility], 1/4              H&P, 1/2    Other       Provider, please provide the integumentary diagnosis related to the documentation of Right Lateral Leg:     [  x ] Non-pressure ulcer, exposed fat layer -Present on admission   [   ] Non-pressure ulcer, muscle involvement without evidence of necrosis  -Present on admission   [   ] Non-pressure ulcer, other depth  -Present on admission (please specify): ___________   [   ] Other Integumentary Diagnosis (please specify): ___________   [  ] Clinically Undetermined       Please document in your progress notes daily for the duration of treatment, until resolved, and include in your discharge summary.    Form No. [id number]

## 2024-01-10 NOTE — TELEPHONE ENCOUNTER
I spoke to Malathi. Told her patient wouldn't be getting the cabo any more as she's on hospice. She thanked me for calling.

## 2024-01-10 NOTE — TELEPHONE ENCOUNTER
----- Message from Arik Persaud MA sent at 1/10/2024 10:32 AM CST -----  Type: Patient Call Back    Who called : Spouse - Mr. Bloom    What is the request in detail: he is asking for the nurse  to give him a call ..     Can the clinic reply by [facility]?NO    Would the patient rather a call back or a response via [facility]? Yes, call     Best call back number: [phone]

## 2024-01-10 NOTE — TELEPHONE ENCOUNTER
"I spoke to patients . He states she is no longer on [facility] and he wants to talk to Liset regarding what to do. I asked if they were interested in starting treatment and he said that's why he wanted to talk to Liset. He said he doesn't know if they would do any treatment, but wanted to know if we can just "keep an eye on the cancer". I told him I would relay the message and either she would call him back or she would tell me how we proceed from here and I would call him back.   "

## 2024-01-11 ENCOUNTER — TELEPHONE (OUTPATIENT)
Dept: FAMILY MEDICINE | Facility: CLINIC | Age: 59
End: 2024-01-11

## 2024-01-11 ENCOUNTER — OFFICE VISIT (OUTPATIENT)
Dept: FAMILY MEDICINE | Facility: CLINIC | Age: 59
End: 2024-01-11
Payer: MEDICARE

## 2024-01-11 VITALS
HEIGHT: 64 IN | DIASTOLIC BLOOD PRESSURE: 76 MMHG | OXYGEN SATURATION: 98 % | TEMPERATURE: 97 F | SYSTOLIC BLOOD PRESSURE: 100 MMHG | WEIGHT: 90.38 LBS | HEART RATE: 106 BPM | BODY MASS INDEX: 15.43 KG/M2

## 2024-01-11 DIAGNOSIS — I48.0 PAROXYSMAL ATRIAL FIBRILLATION: ICD-10-CM

## 2024-01-11 DIAGNOSIS — N25.81 HYPERPARATHYROIDISM, SECONDARY RENAL: ICD-10-CM

## 2024-01-11 DIAGNOSIS — Z86.73 HISTORY OF CVA (CEREBROVASCULAR ACCIDENT): ICD-10-CM

## 2024-01-11 DIAGNOSIS — E43 SEVERE PROTEIN-CALORIE MALNUTRITION: ICD-10-CM

## 2024-01-11 DIAGNOSIS — R00.0 TACHYCARDIA: ICD-10-CM

## 2024-01-11 DIAGNOSIS — G47.00 INSOMNIA, UNSPECIFIED TYPE: ICD-10-CM

## 2024-01-11 DIAGNOSIS — N18.6 ESRD ON HEMODIALYSIS: ICD-10-CM

## 2024-01-11 DIAGNOSIS — I50.42 CHRONIC COMBINED SYSTOLIC AND DIASTOLIC CONGESTIVE HEART FAILURE: ICD-10-CM

## 2024-01-11 DIAGNOSIS — L97.529 ISCHEMIC TOE ULCER, LEFT, WITH UNSPECIFIED SEVERITY: ICD-10-CM

## 2024-01-11 DIAGNOSIS — Z09 HOSPITAL DISCHARGE FOLLOW-UP: Primary | ICD-10-CM

## 2024-01-11 DIAGNOSIS — I70.262 CRITICAL LIMB ISCHEMIA OF LEFT LOWER EXTREMITY WITH GANGRENE: ICD-10-CM

## 2024-01-11 DIAGNOSIS — R53.81 PHYSICAL DEBILITY: ICD-10-CM

## 2024-01-11 DIAGNOSIS — Z90.5 S/P NEPHRECTOMY: ICD-10-CM

## 2024-01-11 DIAGNOSIS — C64.2 RENAL CELL CARCINOMA OF LEFT KIDNEY METASTATIC TO OTHER SITE: ICD-10-CM

## 2024-01-11 DIAGNOSIS — Z99.2 ESRD ON HEMODIALYSIS: ICD-10-CM

## 2024-01-11 PROCEDURE — 99496 TRANSJ CARE MGMT HIGH F2F 7D: CPT | Mod: S$PBB,GV,, | Performed by: FAMILY MEDICINE

## 2024-01-11 PROCEDURE — 99999 PR PBB SHADOW E&M-EST. PATIENT-LVL IV: CPT | Mod: PBBFAC,,, | Performed by: FAMILY MEDICINE

## 2024-01-11 PROCEDURE — 99214 OFFICE O/P EST MOD 30 MIN: CPT | Mod: PBBFAC,PO | Performed by: FAMILY MEDICINE

## 2024-01-11 RX ORDER — MIRTAZAPINE 7.5 MG/1
7.5 TABLET, FILM COATED ORAL NIGHTLY
Qty: 30 TABLET | Refills: 11 | Status: SHIPPED | OUTPATIENT
Start: 2024-01-11 | End: 2025-01-10

## 2024-01-11 RX ORDER — METOPROLOL SUCCINATE 25 MG/1
25 TABLET, EXTENDED RELEASE ORAL DAILY
Qty: 30 TABLET | Refills: 11 | Status: SHIPPED | OUTPATIENT
Start: 2024-01-11 | End: 2025-01-10

## 2024-01-11 NOTE — PROGRESS NOTES
"Transitional Care Note  Subjective:       Patient ID: Eva Bloom is a 58 y.o. female.  Chief Complaint: No chief complaint on file.    Family and/or Caretaker present at visit?  Yes, .  Diagnostic tests reviewed/disposition: No diagnosic tests pending after this hospitalization.  Disease/illness education: caution with use of BB after dialysis  Home health/community services discussion/referrals: She has Home Health established.   Establishment or re-establishment of referral orders for community resources: No other necessary community resources.   Discussion with other health care providers:  [facility], Hospice care team, Podiatry, Vascular Surgery .   Patient is a 57 y/o female that presents to the office with her , her primary caretaker, to follow up on a recent hospitalization for a CVA. Her HPI and hospital course were summarized below:    "HPI:   57 yo F with a PMHx of CHF with EF of 23%, ESRD on dialysis (TTS), Afib on OAC, AICD placement (currently disconnected per ), RCC with metastatic disease to lung and pancreas, presenting to the ED BIB  for altered mental status. History is provided by independent historian, pt's , reports for the last few days, she has been confused, intermittently falling asleep, experiencing auditory hallucinations, and talking to herself, and experiencing dark stools.  took her to dialysis this morning, was able to receive full session but brought her straight here afterwards for further evaluation. She currently has a port a cath to her chest wall for dialysis, and is anuric, no recent missed sessions. Her nephrologist is Dr Bender.  reports she ambulates w/ a walker, is able to hold a full conversation and answer questions at baseline. She has been able to recognize him the last few days.  also reports she received a dose of the COVID vaccine around 4 days ago.   In ER patient is febrile 100.8,chest X ray with " "no sign of pneumonia,Covid and flu are negative,patient received broad spectrum IV Abx,not able get urine,not urinating,blood culture is done,her HH is stable,head CT suspect for CVA,neurology is consulted and patient will have MRI brain and EEG.     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 She currently has a port a cath to her chest wall for dialysis, and is anuric, no recent missed sessions.  also reports she received a dose of the COVID vaccine around 4 days ago.   In ER patient is febrile 100.8,chest X ray with no sign of pneumonia,Covid and flu are negative,patient received broad spectrum IV Abx,not able get urine,not urinating,blood culture is done,her HH is stable,head CT suspect for CVA,neurology is consulted ,can not have MRI duo to AICD,  CTA more consistent with CVA,was on ASA,statin and  PT,OT,ST is consulted.  In next day was Much more alert ,did well with PT,OT.  Patient had her routine HD by nephrology.  At DC time patient was  alert time 3,patient was discharged home with ASA,statin and HH and follow up with PCP as out patient.      Goals of Care Treatment Preferences:  Code Status: Full Code     What is most important right now is to focus on spending time at home, avoiding the hospital, remaining as independent as possible, quality of life, even if it means sacrificing a little time, improvement in condition but with limits to invasive therapies, comfort and QOL .  Accordingly, we have decided that the best plan to meet the patient's goals includes continuing with treatment, enrolling in hospice care."    Today, patient states that she is "fine". Established with [facility] and HH services. Her  states that she has some confusion and a low appetite. Endorses " insomnia. She was prescribed Remeron 15 mg in the past but has not taken it in quite some time. He is requesting an order for a hospital bed. He has all other equipment needed at home. He has noticed that her HR is elevated during dialysis so he restarted her on metoprolol 50 mg. Dialyzes Tues, Thurs, Saturday. She feels weak and fatigued after dialysis. She has a follow up with Podiatry on 1/25 for left toe and heel ulcers.           Review of Systems   Constitutional:  Positive for activity change, appetite change, fatigue and unexpected weight change.   Genitourinary:  Positive for difficulty urinating (anuric).   Musculoskeletal:  Positive for gait problem.   Skin:  Positive for wound.   Neurological:  Positive for weakness.   Psychiatric/Behavioral:  Positive for confusion.        Objective:      Physical Exam  Constitutional:       General: She is not in acute distress.     Appearance: She is underweight.   HENT:      Head: Normocephalic and atraumatic.   Cardiovascular:      Rate and Rhythm: Regular rhythm. Tachycardia present.   Pulmonary:      Effort: Pulmonary effort is normal. No respiratory distress.      Breath sounds: Normal breath sounds.   Musculoskeletal:      Comments: In a wheelchair   Skin:     General: Skin is warm and dry.   Neurological:      Mental Status: She is alert.   Psychiatric:         Behavior: Behavior is withdrawn.      Comments: Minimally communicative         Assessment & Plan:   Hospital discharge follow-up    History of CVA (cerebrovascular accident)  -     HOSPITAL BED FOR HOME USE    Physical debility  -     HOSPITAL BED FOR HOME USE    Rx for hospital bed to be faxed to [facility].     Severe protein-calorie malnutrition  -     mirtazapine (REMERON) 7.5 MG Tab; Take 1 tablet (7.5 mg total) by mouth every evening.  Dispense: 30 tablet; Refill: 11    Insomnia, unspecified type  -     mirtazapine (REMERON) 7.5 MG Tab; Take 1 tablet (7.5 mg total) by mouth every evening.   Dispense: 30 tablet; Refill: 11    Restart low dose Remeron. Advised to take 1 hour before bedtime to minimize daytime fatigue. May try 1/2 tablet if too sedating.     Tachycardia  -     metoprolol succinate (TOPROL-XL) 25 MG 24 hr tablet; Take 1 tablet (25 mg total) by mouth once daily.  Dispense: 30 tablet; Refill: 11    Paroxysmal atrial fibrillation  -     metoprolol succinate (TOPROL-XL) 25 MG 24 hr tablet; Take 1 tablet (25 mg total) by mouth once daily.  Dispense: 30 tablet; Refill: 11    Advised to lower dose of BB to 25 mg, given possible risk of hypotension on dialysis days with concurrent use of BB.    Chronic combined systolic and diastolic congestive heart failure  -     metoprolol succinate (TOPROL-XL) 25 MG 24 hr tablet; Take 1 tablet (25 mg total) by mouth once daily.  Dispense: 30 tablet; Refill: 11  -     HOSPITAL BED FOR HOME USE    See above.    Renal cell carcinoma of left kidney metastatic to other site  S/p nephrectomy  ESRD on hemodialysis.   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       F/u in 1 month.

## 2024-01-15 PROBLEM — R65.20 SEVERE SEPSIS: Status: RESOLVED | Noted: 2022-10-29 | Resolved: 2024-01-15

## 2024-01-15 PROBLEM — A41.9 SEVERE SEPSIS: Status: RESOLVED | Noted: 2022-10-29 | Resolved: 2024-01-15

## 2024-01-16 ENCOUNTER — HOSPITAL ENCOUNTER (EMERGENCY)
Facility: HOSPITAL | Age: 59
Discharge: HOME | End: 2024-01-16
Attending: STUDENT IN AN ORGANIZED HEALTH CARE EDUCATION/TRAINING PROGRAM
Payer: MEDICARE

## 2024-01-16 VITALS
SYSTOLIC BLOOD PRESSURE: 127 MMHG | WEIGHT: 99 LBS | DIASTOLIC BLOOD PRESSURE: 85 MMHG | TEMPERATURE: 98 F | OXYGEN SATURATION: 99 % | HEIGHT: 64 IN | HEART RATE: 107 BPM | BODY MASS INDEX: 16.9 KG/M2 | RESPIRATION RATE: 18 BRPM

## 2024-01-16 DIAGNOSIS — S52.501A CLOSED FRACTURE OF DISTAL ENDS OF RIGHT RADIUS AND ULNA, INITIAL ENCOUNTER: Primary | ICD-10-CM
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
## 2024-01-16 DIAGNOSIS — S52.601A CLOSED FRACTURE OF DISTAL ENDS OF RIGHT RADIUS AND ULNA, INITIAL ENCOUNTER: Primary | ICD-10-CM

## 2024-01-16 PROCEDURE — 25000003 PHARM REV CODE 250

## 2024-01-16 PROCEDURE — 29126 APPL SHORT ARM SPLINT DYN: CPT | Mod: RT

## 2024-01-16 PROCEDURE — 99284 EMERGENCY DEPT VISIT MOD MDM: CPT

## 2024-01-16 RX ORDER — ACETAMINOPHEN 500 MG
500 TABLET ORAL EVERY 4 HOURS PRN
Qty: 30 TABLET | Refills: 0 | Status: SHIPPED | OUTPATIENT
Start: 2024-01-16

## 2024-01-16 RX ORDER — ACETAMINOPHEN 325 MG/1
650 TABLET ORAL
Status: COMPLETED | OUTPATIENT
Start: 2024-01-16 | End: 2024-01-16

## 2024-01-16 RX ORDER — OXYCODONE HYDROCHLORIDE 5 MG/1
5 TABLET ORAL EVERY 6 HOURS PRN
Qty: 11 TABLET | Refills: 0 | Status: SHIPPED | OUTPATIENT
Start: 2024-01-16

## 2024-01-16 RX ADMIN — ACETAMINOPHEN 650 MG: 325 TABLET ORAL at 04:01

## 2024-01-16 NOTE — DISCHARGE INSTRUCTIONS

## 2024-01-16 NOTE — ED PROVIDER NOTES
Encounter Date: 1/16/2024       History     Chief Complaint   Patient presents with    Fall     Pt co of R wrist pain after falling out of bed this morning. Denies LOC of hitting head. Pt unable to move arm as it hurts too much, some minor swelling to wrist area, and is able to move fingers. Pt is on blood thinners. Denies any headaches, blurry vision, back pain, abd pain, CP. Pt is on dialysis, went to treatment this am.     57 yo F with a PMHx of CHF, ESRD on dialysis (TTS), Afib, AICD placement RCC with metastatic disease to lung and pancreas, presenting to the ED for emergent evaluation of right wrist pain after an accidental mechanical trip and fall at 1:30 p.m. today.  She reports injuring her right wrist.  She denies any head trauma or loss of consciousness.  Patient presented to this facility on 01/02/2024 where she had a stroke.  Patient's  is currently present with the patient.  He states that she is at her baseline mental status since the stroke.  She denies any fever, chills, chest pain, shortness of breath, abdominal pain, nausea, vomiting, diarrhea, dysuria, hematuria.  No other symptoms reported.    The history is provided by the patient. No  was used.     Review of patient's allergies indicates:   Allergen Reactions    Carvedilol Other (See Comments)     Nausea/vomiting    Allopurinol      Other reaction(s): abnormal transaminases     Past Medical History:   Diagnosis Date    Anemia     Anticoagulant long-term use     Atrial fibrillation     Bronchitis 03/01/2017    Cancer 2016    kidney cancer    CHF (congestive heart failure), NYHA class II, chronic, systolic     CMV (cytomegalovirus) antibody positive     CVA (cerebral vascular accident) 1/3/2024    Encounter for blood transfusion     ESRD (end stage renal disease)     Essential hypertension 09/23/2015    H/O herpes simplex type 2 infection     Herpes simplex type 1 antibody positive     History of kidney cancer     s/p  left nephrectomy 1/2016    Hyperparathyroidism, unspecified     Hyperuricemia without signs of inflammatory arthritis and tophaceous disease     Kidney stones     LGSIL (low grade squamous intraepithelial dysplasia)     Myocardiopathy 07/21/2017    Prediabetes     Proteinuria     Renal disorder     Thyroid nodule     Urate nephropathy      Past Surgical History:   Procedure Laterality Date    BREAST CYST EXCISION      COLONOSCOPY N/A 11/12/2015    COLONOSCOPY N/A 3/12/2021    Procedure: COLONOSCOPY;  Surgeon: Brendon Lanier MD;  Location: [facility] ENDO;  Service: Endoscopy;  Laterality: N/A;  covid test 3/9, labs prior, prep instr mailed -ml    EXCISION OF ARTERIOVENOUS FISTULA Left 9/27/2023    Procedure: EXCISION, AV FISTULA;  Surgeon: FARIDEH Muñoz III, MD;  [facility];  Service: Vascular;  Laterality: Left;  LUE AV graft excision    INSERTION OF BIVENTRICULAR IMPLANTABLE CARDIOVERTER-DEFIBRILLATOR (ICD)  04/2021    INSERTION OF TUNNELED CENTRAL VENOUS CATHETER (CVC) WITH SUBCUTANEOUS PORT N/A 1/25/2023    Procedure: INSERTION, DUAL LUMEN CATHETER WITH PORT, WITH IMAGING GUIDANCE;  Surgeon: FARIDEH Muñoz III, MD;  [facility];  Service: Peripheral Vascular;  Laterality: N/A;  possinble permcath placment    NEPHRECTOMY-LAPAROSCOPIC Left 01/12/2016    PERCUTANEOUS TRANSLUMINAL ANGIOPLASTY OF ARTERIOVENOUS FISTULA Left 4/19/2023    Procedure: PTA, AV FISTULA;  Surgeon: FARIDEH Muñoz III, MD;  Location: [facility] CATH LAB;  Service: Peripheral Vascular;  Laterality: Left;    PERITONEAL CATHETER INSERTION      Permacath insertion  01/12/2017    PLACEMENT OF ARTERIOVENOUS GRAFT  12/28/2022    Procedure: INSERTION, GRAFT, ARTERIOVENOUS;  Surgeon: FARIDEH Muñoz III, MD;  [facility];  Service: Peripheral Vascular;;    REMOVAL, GRAFT, ARTERIOVENOUS, UPPER EXTREMITY Left 1/25/2023    Procedure: REMOVAL, GRAFT, ARTERIOVENOUS, UPPER EXTREMITY;  Surgeon: FARIDEH Muñoz III, MD;   Location: [facility] OR 2ND FLR;  Service: Peripheral Vascular;  Laterality: Left;    REVISION OF ARTERIOVENOUS FISTULA Left 5/1/2023    Procedure: REVISION, AV FISTULA;  Surgeon: FARIDEH Muñoz III, MD;  Location: [facility] OR [facility] FLR;  Service: Peripheral Vascular;  Laterality: Left;  LUE AVG revision    REVISION OF PROCEDURE INVOLVING ARTERIOVENOUS GRAFT Left 12/28/2022    Procedure: REVISION, PROCEDURE INVOLVING ARTERIOVENOUS GRAFT;  Surgeon: FARIDEH Muñoz III, MD;  [facility];  Service: Peripheral Vascular;  Laterality: Left;    REVISION OF PROCEDURE INVOLVING ARTERIOVENOUS GRAFT Left 7/21/2023    Procedure: LEFT ARM ARTERIOVENOUS GRAFT EXCISION  AND LIGATION;  Surgeon: Obi Werner MD;  Location: [facility];  Service: Vascular;  Laterality: Left;  Left AVG excision and revision with interposition    SALPINGOOPHORECTOMY Right 2016    KJB---DAVINCI    TONSILLECTOMY      TUBAL LIGATION       Family History   Problem Relation Age of Onset    Hypertension Mother     Diabetes Father     Kidney disease Father     No Known Problems Sister     Heart disease Sister     No Known Problems Sister     No Known Problems Brother     No Known Problems Brother     Cataracts Maternal Aunt     No Known Problems Maternal Uncle     No Known Problems Paternal Aunt     No Known Problems Paternal Uncle     No Known Problems Maternal Grandmother     Diabetes Maternal Grandfather     No Known Problems Paternal Grandmother     No Known Problems Paternal Grandfather     No Known Problems Son     No Known Problems Son     No Known Problems Other     Breast cancer Neg Hx     Colon cancer Neg Hx     Cancer Neg Hx     Stroke Neg Hx     Amblyopia Neg Hx     Blindness Neg Hx     Glaucoma Neg Hx     Macular degeneration Neg Hx     Retinal detachment Neg Hx     Strabismus Neg Hx     Thyroid disease Neg Hx      Social History     Tobacco Use    Smoking status: Never     Passive exposure: Never    Smokeless tobacco: Never    Substance Use Topics    Alcohol use: No    Drug use: Yes     Types: Hydrocodone     Review of Systems   Constitutional:  Negative for chills and fever.   HENT:  Negative for congestion, ear pain, rhinorrhea and sore throat.    Eyes:  Negative for redness.   Respiratory:  Negative for cough and shortness of breath.    Cardiovascular:  Negative for chest pain.   Gastrointestinal:  Negative for abdominal pain, diarrhea, nausea and vomiting.   Genitourinary:  Negative for decreased urine volume, difficulty urinating, dysuria, frequency, hematuria and urgency.   Musculoskeletal:  Positive for arthralgias. Negative for back pain and neck pain.   Skin:  Negative for rash.   Neurological:  Negative for headaches.        (-) head trauma  (-) LOC   Psychiatric/Behavioral:  Negative for confusion.        Physical Exam     Initial Vitals [01/16/24 1454]   BP Pulse Resp Temp SpO2   117/79 108 18 98.3 °F (36.8 °C) 100 %      MAP       --         Physical Exam    Nursing note and vitals reviewed.  Constitutional: She appears well-developed and well-nourished.  Non-toxic appearance. She does not appear ill.   HENT:   Head: Normocephalic and atraumatic.   Right Ear: Hearing, tympanic membrane, external ear and ear canal normal. Tympanic membrane is not perforated, not erythematous and not bulging.   Left Ear: Hearing, tympanic membrane, external ear and ear canal normal. Tympanic membrane is not perforated, not erythematous and not bulging.   Nose: Nose normal.   Mouth/Throat: Uvula is midline, oropharynx is clear and moist and mucous membranes are normal.   Eyes: Conjunctivae and EOM are normal.   Neck: Neck supple.   Normal range of motion.   Full passive range of motion without pain.     Cardiovascular:  Normal rate and regular rhythm.           Pulses:       Radial pulses are 2+ on the right side and 2+ on the left side.   Pulmonary/Chest: Effort normal and breath sounds normal. No accessory muscle usage. No respiratory  distress. She has no decreased breath sounds.   Abdominal: Abdomen is soft. Bowel sounds are normal. She exhibits no distension. There is no abdominal tenderness. There is no rebound and no guarding.   Musculoskeletal:         General: Normal range of motion.      Cervical back: Full passive range of motion without pain, normal range of motion and neck supple. No rigidity.      Comments:   Full range motion of bilateral fingers, left wrists, bilateral elbows, bilateral shoulders.  Decreased range of motion of right wrist secondary to pain.  There is some mild edema.  No surrounding areas of erythema or cellulitis.  No obvious bony deformity.     Neurological: She is alert. No cranial nerve deficit.   Neuro intact.  Strength and sensation intact bilateral upper and lower extremities.   Skin: Skin is warm and dry.   Psychiatric: She has a normal mood and affect.         ED Course   Splint Application    Date/Time: 1/16/2024 4:00 PM    Performed by: Ricco Erickson PA-C  Authorized by: Michael Torres MD  Consent Done: Yes  Consent: Verbal consent obtained.  Risks and benefits: risks, benefits and alternatives were discussed  Consent given by: spouse  Location details: right arm  Splint type: dynamic short arm  Post-procedure: The splinted body part was neurovascularly unchanged following the procedure.  Patient tolerance: Patient tolerated the procedure well with no immediate complications        Labs Reviewed - No data to display       Imaging Results              X-Ray Wrist Complete Right (Final result)  Result time 01/16/24 15:36:46      Final result by Kirk Adhikari MD (01/16/24 15:36:46)                   Impression:      Acute nondisplaced fractures involving the distal metaphysis of the radius and ulna.    Extensive vascular calcifications.      Electronically signed by: Kirk Adhikari MD  Date:    01/16/2024  Time:    15:36               Narrative:    EXAMINATION:  XR WRIST COMPLETE 3 VIEWS RIGHT    CLINICAL  HISTORY:  Unspecified fall, initial encounter    TECHNIQUE:  PA, lateral, and oblique views of the right wrist were performed.    COMPARISON:  None    FINDINGS:  The bone mineralization is within normal limits.  There are acute nondisplaced fractures involving the distal metaphysis of the radius and ulna.    There is joint space narrowing.  There are advanced vascular calcifications.  There is soft tissue swelling about the right wrist.                                       Medications   acetaminophen tablet 650 mg (650 mg Oral Given 1/16/24 6381)     Medical Decision Making  This is a 57 yo F with a PMHx of CHF, ESRD on dialysis (TTS), Afib, AICD placement RCC with metastatic disease to lung and pancreas, presenting to the ED for emergent evaluation of right wrist pain after an accidental mechanical trip and fall at 1:30 p.m. today.  She reports injuring her right wrist.  She denies any head trauma or loss of consciousness.  On physical exam, patient is well-appearing and in no acute distress.  Nontoxic appearing.  Lungs are clear to auscultation bilaterally.  Abdomen is soft and nontender.  No guarding, rigidity, rebound.  2+ radial pulses bilaterally.  Posterior oropharynx is not erythematous.  No edema or exudate.  Uvula midline.  Bilateral tympanic membrane is normal.  No erythema, bulging, or perforations.  Neuro intact.  Strength and sensation intact bilateral upper and lower extremities.  Full range motion of bilateral fingers, left wrists, bilateral elbows, bilateral shoulders.  Decreased range of motion of right wrist secondary to pain.  There is some mild edema.  No surrounding areas of erythema or cellulitis.  No obvious bony deformity. Good Distal pulses. X-ray wrist revealed   Acute nondisplaced fractures involving the distal metaphysis of the radius and ulna.    Extensive vascular calcifications.    Short arm splint applied. Patient tolerated the procedure well with no acute complications. She was  neurovascularly intact after splint application.  Sling applied.  Encouraged rice therapy upon discharge.  Will discharge patient on tylenol and a short course of pain medication. Ambulatory referral to orthopedist ordered. Other orthopedist information was also given on patient's discharge paperwork.     Strict return precautions given. I discussed with the patient/family the diagnosis, treatment plan, indications for return to the emergency department, and for expected follow-up. The patient/family verbalized an understanding. The patient/family is asked if there are any questions or concerns. We discuss the case, until all issues are addressed to the patient/family's satisfaction. Patient/family understands and is agreeable to the plan. Patient is stable and ready for discharge.        Amount and/or Complexity of Data Reviewed  Radiology: ordered.    Risk  OTC drugs.  Prescription drug management.                                      Clinical Impression:  Final diagnoses:  [W19.XXXA] Fall  [S52.501A, S52.601A] Closed fracture of distal ends of right radius and ulna, initial encounter (Primary)          ED Disposition Condition    Discharge Stable          ED Prescriptions       Medication Sig Dispense Start Date End Date Auth. Provider    acetaminophen (TYLENOL) 500 MG tablet Take 1 tablet (500 mg total) by mouth every 4 (four) hours as needed for Pain or Temperature greater than (100.5 or greater). 30 tablet 1/16/2024 -- Ricco Erickson PA-C    oxyCODONE (ROXICODONE) 5 MG immediate release tablet Take 1 tablet (5 mg total) by mouth every 6 (six) hours as needed for Pain. 11 tablet 1/16/2024 -- Ricco Erickson PA-C          Follow-up Information       Follow up With Specialties Details Why Contact Info Additional Information    Orthopedics, [facility] - Orthopedic Surgery, Orthopedic Surgery In 2 days for further evaluation [address] [facility] [address]  [place] [address]  [phone]       [place]  Orthopedics Orthopedics In 2 days for further evaluation [address]  [address]  [facility] [address] [phone] Please park in [address] or D and use [address] owen. Take Medical Office Bldg. elevators.    Pino Ram MD Orthopedic Surgery In 2 days If symptoms worsen [address]  [address]  [facility] [address]  [phone]                Ricco Erickson, PAPiliC  01/16/24 3001

## 2024-01-17 ENCOUNTER — TELEPHONE (OUTPATIENT)
Dept: ORTHOPEDICS | Facility: CLINIC | Age: 59
End: 2024-01-17
Payer: MEDICARE

## 2024-01-21 NOTE — PROGRESS NOTES
1/15/2024    Spoke to patient's . Rescheduled appt due to [employer] parade in area and unable to get to patient's house.    Rescheduled for 1/22.  notified and agrees to date    .jose antonio

## 2024-01-22 ENCOUNTER — CARE AT HOME (OUTPATIENT)
Dept: HOME HEALTH SERVICES | Facility: CLINIC | Age: 59
End: 2024-01-22
Payer: MEDICARE

## 2024-01-22 ENCOUNTER — OFFICE VISIT (OUTPATIENT)
Dept: HEMATOLOGY/ONCOLOGY | Facility: CLINIC | Age: 59
End: 2024-01-22
Payer: MEDICARE

## 2024-01-22 VITALS
HEART RATE: 106 BPM | BODY MASS INDEX: 16.99 KG/M2 | OXYGEN SATURATION: 99 % | RESPIRATION RATE: 14 BRPM | HEIGHT: 64 IN | SYSTOLIC BLOOD PRESSURE: 114 MMHG | TEMPERATURE: 98 F | DIASTOLIC BLOOD PRESSURE: 81 MMHG

## 2024-01-22 DIAGNOSIS — Z99.2 ESRD (END STAGE RENAL DISEASE) ON DIALYSIS: Chronic | ICD-10-CM

## 2024-01-22 DIAGNOSIS — C64.9 METASTATIC RENAL CELL CARCINOMA TO LUNG, UNSPECIFIED LATERALITY: ICD-10-CM

## 2024-01-22 DIAGNOSIS — C64.9 METASTATIC RENAL CELL CARCINOMA TO INTRA-ABDOMINAL SITE: Primary | ICD-10-CM

## 2024-01-22 DIAGNOSIS — I50.42 CHRONIC COMBINED SYSTOLIC AND DIASTOLIC CONGESTIVE HEART FAILURE: ICD-10-CM

## 2024-01-22 DIAGNOSIS — C78.00 METASTATIC RENAL CELL CARCINOMA TO LUNG, UNSPECIFIED LATERALITY: ICD-10-CM

## 2024-01-22 DIAGNOSIS — C79.89 METASTATIC RENAL CELL CARCINOMA TO INTRA-ABDOMINAL SITE: Primary | ICD-10-CM

## 2024-01-22 DIAGNOSIS — Z71.89 COUNSELING REGARDING ADVANCE CARE PLANNING AND GOALS OF CARE: ICD-10-CM

## 2024-01-22 DIAGNOSIS — N18.6 ESRD (END STAGE RENAL DISEASE) ON DIALYSIS: Chronic | ICD-10-CM

## 2024-01-22 DIAGNOSIS — Z51.5 PALLIATIVE CARE ENCOUNTER: Primary | ICD-10-CM

## 2024-01-22 DIAGNOSIS — I63.9 CEREBROVASCULAR ACCIDENT (CVA), UNSPECIFIED MECHANISM: ICD-10-CM

## 2024-01-22 DIAGNOSIS — C64.2 METASTATIC RENAL CELL CARCINOMA, LEFT: ICD-10-CM

## 2024-01-22 PROCEDURE — 99350 HOME/RES VST EST HIGH MDM 60: CPT | Mod: S$GLB,,, | Performed by: NURSE PRACTITIONER

## 2024-01-22 PROCEDURE — 99214 OFFICE O/P EST MOD 30 MIN: CPT | Mod: PBBFAC | Performed by: INTERNAL MEDICINE

## 2024-01-22 PROCEDURE — 99999 PR PBB SHADOW E&M-EST. PATIENT-LVL IV: CPT | Mod: PBBFAC,,, | Performed by: INTERNAL MEDICINE

## 2024-01-22 PROCEDURE — 99214 OFFICE O/P EST MOD 30 MIN: CPT | Mod: S$PBB,GV,, | Performed by: INTERNAL MEDICINE

## 2024-01-22 PROCEDURE — 99497 ADVNCD CARE PLAN 30 MIN: CPT | Mod: S$GLB,,, | Performed by: NURSE PRACTITIONER

## 2024-01-22 NOTE — PROGRESS NOTES
"  Subjective:       Patient ID: Eva Bloom is a 58 y.o. female.    Chief Complaint: RCC    HPI     58 y.o.female to clinic for follow up for metastatic RCC. Patient was previously on hospice but now not enrolled as she wants to continue dialysis. Around the new year, was hospitilized for a CVA and recently had a fall where she fractured her right wrist.  Pt has had decline in PS, now requiring assist with all ADLs.   reports intermittent appetite.Off cabozantinib.  Pt still is on ESRD with HD every TTS. Has HH coming out with PT services.    ECOG 3. She is accompanied with her . She presents in a wheelchair.      Oncologic History (From Chart and Patient):  Eva Bloom is a 58 y.o.female with ESRD on HD who was found to have two L renal masses. She underwent L lap nephrectomy on 1/12/16. 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 Margins were negative.  Shehealed from surgery well, but then developed a large ovarian mass.  This was removed by gyn along with multiple sites of metastatic disease in the pelvis.  Path was c/w recurrence of RCC.     11/20/16 Pelvic ultrasound reveals "Large heterogeneous cystic and solid mass which appears to arise from the right ovary with worrisome imaging features for malignancy.  Differential diagnosis includes malignant tumors such as serous or mucinous cystadenocarcinoma.  It is important to note that the patient is at risk for ovarian torsion due to the size of the mass.Multiple uterine fibroids are identified with the largest in the uterine fundus."    11/22/16 pathology reveals "FINAL PATHOLOGIC DIAGNOSIS-RIGHT OVARY AND FALLOPIAN TUBE, RIGHT SALPINGO-OOPHORECTOMY:  -Positive for malignancy, high grade carcinoma morphologically and immunohistochemically consistent with metastasis from the patient's known renal cell carcinoma"    Review " of Systems   Constitutional:  Positive for activity change, appetite change, fatigue and unexpected weight change. Negative for chills and fever.   HENT:  Negative for congestion, hearing loss, mouth sores, postnasal drip, sore throat, tinnitus and voice change.    Eyes:  Negative for pain and visual disturbance.   Respiratory:  Negative for cough, shortness of breath and wheezing.    Cardiovascular:  Negative for chest pain, palpitations and leg swelling.   Gastrointestinal:  Negative for abdominal pain, constipation, diarrhea, nausea and vomiting.   Endocrine: Negative for cold intolerance and heat intolerance.   Genitourinary:  Negative for difficulty urinating, dyspareunia, dysuria, frequency, menstrual problem, urgency, vaginal bleeding, vaginal discharge and vaginal pain.   Musculoskeletal:  Negative for arthralgias and myalgias.        LE ulcerations and pain    Skin:  Negative for color change, rash and wound.   Allergic/Immunologic: Negative for environmental allergies and food allergies.   Neurological:  Negative for weakness, numbness and headaches.   Hematological:  Negative for adenopathy. Does not bruise/bleed easily.   Psychiatric/Behavioral:  Negative for agitation, confusion, hallucinations and sleep disturbance. The patient is not nervous/anxious.    All other systems reviewed and are negative.      Allergies:  Review of patient's allergies indicates:   Allergen Reactions    Allopurinol      Other reaction(s): abnormal transaminases       Medications:  Current Outpatient Medications   Medication Sig Dispense Refill    acetaminophen (TYLENOL) 500 MG tablet Take 500 mg by mouth every 6 (six) hours as needed for Pain.      acetaminophen (TYLENOL) 500 MG tablet Take 1 tablet (500 mg total) by mouth every 4 (four) hours as needed for Pain or Temperature greater than (100.5 or greater). 30 tablet 0    albuterol-ipratropium (DUO-NEB) 2.5 mg-0.5 mg/3 mL nebulizer solution Take by nebulization.       apixaban (ELIQUIS) 2.5 mg Tab Take 1 tablet (2.5 mg total) by mouth 2 (two) times daily. 60 tablet 11    aspirin (ECOTRIN) 81 MG EC tablet Take 1 tablet (81 mg total) by mouth once daily. 90 tablet 3    atorvastatin (LIPITOR) 40 MG tablet Take 1 tablet (40 mg total) by mouth once daily. 90 tablet 0    bisacodyL (DULCOLAX) 10 mg Supp Place 10 mg rectally.      cabozantinib (CABOMETYX) 60 mg Tab TAKE ONE TABLET BY MOUTH ONCE DAILY AT THE SAME TIME ON AN EMPTY STOMACH AT LEAST 1 HOUR BEFORE OR 2 HOURS AFTER EATING. AVOID GRAPEFRUIT PRODUCTS (Patient not taking: Reported on 1/22/2024) 30 tablet 4    cyclobenzaprine (FLEXERIL) 5 MG tablet TAKE 1 TABLET BY MOUTH DAILY AS NEEDED FOR MUSCLE SPASMS. (Patient not taking: Reported on 1/22/2024) 30 tablet 1    hyoscyamine (LEVSIN/SL) 0.125 mg Subl Place under the tongue.      lanthanum (FOSRENOL) 1000 MG chewable tablet Take 1 tablet by mouth.      levothyroxine (SYNTHROID) 75 MCG tablet Take 1 tablet (75 mcg total) by mouth once daily. 30 tablet 11    lidocaine-prilocaine (EMLA) cream APPLY ATLEAST 30 MINUTES BEFORE TREATMENT 3 TIMES A WEEK (Patient not taking: Reported on 1/22/2024) 30 g 11    LORAZEPAM INTENSOL 2 mg/mL Conc Take by mouth.      metoprolol succinate (TOPROL-XL) 25 MG 24 hr tablet Take 1 tablet (25 mg total) by mouth once daily. 30 tablet 11    mirtazapine (REMERON) 7.5 MG Tab Take 1 tablet (7.5 mg total) by mouth every evening. 30 tablet 11    mv,Ca,min-folic acid-vit K1 (ONE-A-DAY WOMEN'S 50 PLUS) 400-20 mcg Tab Take 1 tablet by mouth once daily.      naloxone (NARCAN) 1 mg/mL injection 2 mg (1 mg per nostril) by Nasal route as needed for opioid overdose; may repeat in 3 to 5 minutes if not effective. Call 911 (Patient not taking: Reported on 1/22/2024) 2 mL 11    nystatin (MYCOSTATIN) cream SMARTSIG:sparingly Topical 2-4 Times Daily PRN      ondansetron (ZOFRAN-ODT) 4 MG TbDL Take by mouth.      oxyCODONE (ROXICODONE) 5 MG immediate release tablet Take 1  tablet (5 mg total) by mouth every 6 (six) hours as needed for Pain. 11 tablet 0    pantoprazole (PROTONIX) 40 MG tablet Take 1 tablet (40 mg total) by mouth 2 (two) times daily. (Patient not taking: Reported on 1/22/2024) 60 tablet 11    traMADoL (ULTRAM) 50 mg tablet Take 1 tablet (50 mg total) by mouth every 12 (twelve) hours as needed for Pain. (Patient not taking: Reported on 1/22/2024) 30 each 1     No current facility-administered medications for this visit.     Facility-Administered Medications Ordered in Other Visits   Medication Dose Route Frequency Provider Last Rate Last Admin    0.9%  NaCl infusion   Intravenous Continuous Soni Bhatti MD   New Bag at 07/21/23 1116       PMH:  Past Medical History:   Diagnosis Date    Anemia     Anticoagulant long-term use     Atrial fibrillation     Bronchitis 03/01/2017    Cancer 2016    kidney cancer    CHF (congestive heart failure), NYHA class II, chronic, systolic     CMV (cytomegalovirus) antibody positive     CVA (cerebral vascular accident) 1/3/2024    Encounter for blood transfusion     ESRD (end stage renal disease)     Essential hypertension 09/23/2015    H/O herpes simplex type 2 infection     Herpes simplex type 1 antibody positive     History of kidney cancer     s/p left nephrectomy 1/2016    Hyperparathyroidism, unspecified     Hyperuricemia without signs of inflammatory arthritis and tophaceous disease     Kidney stones     LGSIL (low grade squamous intraepithelial dysplasia)     Myocardiopathy 07/21/2017    Prediabetes     Proteinuria     Renal disorder     Thyroid nodule     Urate nephropathy        PSH:  Past Surgical History:   Procedure Laterality Date    BREAST CYST EXCISION      COLONOSCOPY N/A 11/12/2015    COLONOSCOPY N/A 3/12/2021    Procedure: COLONOSCOPY;  Surgeon: Brendon Lanier MD;  Location: [facility];  Service: Endoscopy;  Laterality: N/A;  covid test 3/9, labs prior, prep instr mailed -ml    EXCISION OF ARTERIOVENOUS FISTULA  Left 9/27/2023    Procedure: EXCISION, AV FISTULA;  Surgeon: FARIDEH Muñoz III, MD;  Location: [facility] OR 2ND FLR;  Service: Vascular;  Laterality: Left;  LUE AV graft excision    INSERTION OF BIVENTRICULAR IMPLANTABLE CARDIOVERTER-DEFIBRILLATOR (ICD)  04/2021    INSERTION OF TUNNELED CENTRAL VENOUS CATHETER (CVC) WITH SUBCUTANEOUS PORT N/A 1/25/2023    Procedure: INSERTION, DUAL LUMEN CATHETER WITH PORT, WITH IMAGING GUIDANCE;  Surgeon: FARIDEH Muñoz III, MD;  Location: [facility] OR 2ND FLR;  Service: Peripheral Vascular;  Laterality: N/A;  possinble permcath placment    NEPHRECTOMY-LAPAROSCOPIC Left 01/12/2016    PERCUTANEOUS TRANSLUMINAL ANGIOPLASTY OF ARTERIOVENOUS FISTULA Left 4/19/2023    Procedure: PTA, AV FISTULA;  Surgeon: FARIDEH Muñoz III, MD;  Location: [facility] CATH LAB;  Service: Peripheral Vascular;  Laterality: Left;    PERITONEAL CATHETER INSERTION      Permacath insertion  01/12/2017    PLACEMENT OF ARTERIOVENOUS GRAFT  12/28/2022    Procedure: INSERTION, GRAFT, ARTERIOVENOUS;  Surgeon: FARIDEH Muñoz III, MD;  Location: [facility] OR 2ND FLR;  Service: Peripheral Vascular;;    REMOVAL, GRAFT, ARTERIOVENOUS, UPPER EXTREMITY Left 1/25/2023    Procedure: REMOVAL, GRAFT, ARTERIOVENOUS, UPPER EXTREMITY;  Surgeon: FARIDEH Muñoz III, MD;  Location: [facility] OR 2ND FLR;  Service: Peripheral Vascular;  Laterality: Left;    REVISION OF ARTERIOVENOUS FISTULA Left 5/1/2023    Procedure: REVISION, AV FISTULA;  Surgeon: FARIDEH Muñoz III, MD;  Location: [facility] OR 2ND FLR;  Service: Peripheral Vascular;  Laterality: Left;  LUE AVG revision    REVISION OF PROCEDURE INVOLVING ARTERIOVENOUS GRAFT Left 12/28/2022    Procedure: REVISION, PROCEDURE INVOLVING ARTERIOVENOUS GRAFT;  Surgeon: FARIDEH Muñoz III, MD;  Location: [facility] OR 2ND FLR;  Service: Peripheral Vascular;  Laterality: Left;    REVISION OF PROCEDURE INVOLVING ARTERIOVENOUS GRAFT Left 7/21/2023    Procedure: LEFT ARM ARTERIOVENOUS GRAFT EXCISION  AND LIGATION;   Surgeon: Obi Werner MD;  Location: [facility];  Service: Vascular;  Laterality: Left;  Left AVG excision and revision with interposition    SALPINGOOPHORECTOMY Right 2016    KJB---DAVINCI    TONSILLECTOMY      TUBAL LIGATION         FamHx:  Family History   Problem Relation Age of Onset    Hypertension Mother     Diabetes Father     Kidney disease Father     No Known Problems Sister     Heart disease Sister     No Known Problems Sister     No Known Problems Brother     No Known Problems Brother     Cataracts Maternal Aunt     No Known Problems Maternal Uncle     No Known Problems Paternal Aunt     No Known Problems Paternal Uncle     No Known Problems Maternal Grandmother     Diabetes Maternal Grandfather     No Known Problems Paternal Grandmother     No Known Problems Paternal Grandfather     No Known Problems Son     No Known Problems Son     No Known Problems Other     Breast cancer Neg Hx     Colon cancer Neg Hx     Cancer Neg Hx     Stroke Neg Hx     Amblyopia Neg Hx     Blindness Neg Hx     Glaucoma Neg Hx     Macular degeneration Neg Hx     Retinal detachment Neg Hx     Strabismus Neg Hx     Thyroid disease Neg Hx        SocHx:  Social History     Socioeconomic History    Marital status:     Number of children: 2   Occupational History    Occupation: [place]     Employer: [id number]   Tobacco Use    Smoking status: Never     Passive exposure: Never    Smokeless tobacco: Never   Substance and Sexual Activity    Alcohol use: No    Drug use: Yes     Types: Hydrocodone    Sexual activity: Not Currently     Social Determinants of Health     Financial Resource Strain: Low Risk  (9/28/2023)    Overall Financial Resource Strain (CARDIA)     Difficulty of Paying Living Expenses: Not hard at all   Food Insecurity: No Food Insecurity (9/28/2023)    Hunger Vital Sign     Worried About Running Out of Food in the Last Year: Never true     Ran Out of Food in the Last Year: Never true    Transportation Needs: No Transportation Needs (9/28/2023)    PRAPARE - Transportation     Lack of Transportation (Medical): No     Lack of Transportation (Non-Medical): No   Physical Activity: Inactive (9/28/2023)    Exercise Vital Sign     Days of Exercise per Week: 0 days     Minutes of Exercise per Session: 0 min   Stress: No Stress Concern Present (9/28/2023)    [place] [employer] - Occupational Stress Questionnaire     Feeling of Stress : Not at all   Social Connections: Moderately Integrated (9/28/2023)    Social Connection and Isolation Panel [NHANES]     Frequency of Communication with Friends and Family: More than three times a week     Frequency of Social Gatherings with Friends and Family: More than three times a week     Attends Judaism Services: More than 4 times per year     Active Member of Clubs or Organizations: No     Attends Club or Organization Meetings: Never     Marital Status:    Housing Stability: Low Risk  (9/28/2023)    Housing Stability Vital Sign     Unable to Pay for Housing in the Last Year: No     Number of Places Lived in the Last Year: 1     Unstable Housing in the Last Year: No         Objective:     Vitals:    01/22/24 0958   BP: 114/81   Pulse: 106   Resp: 14   Temp: 98.4 °F (36.9 °C)           Physical Exam  Vitals and nursing note reviewed.   Constitutional:       General: She is not in acute distress.     Appearance: She is well-developed.      Comments: Thin appearance, in wheelchair   HENT:      Head: Normocephalic and atraumatic.      Nose: Nose normal.      Mouth/Throat:      Pharynx: No oropharyngeal exudate.   Eyes:      General:         Right eye: No discharge.         Left eye: No discharge.      Conjunctiva/sclera: Conjunctivae normal.      Pupils: Pupils are equal, round, and reactive to light.   Neck:      Trachea: No tracheal deviation.   Cardiovascular:      Comments: No swelling to bilateral lower extremities.   Musculoskeletal:          General: No tenderness. Normal range of motion.      Comments:      Skin:     General: Skin is warm and dry.      Coloration: Skin is not pale.      Findings: No erythema or rash.      Comments: Ulcerations on LE   Neurological:      Mental Status: She is alert and oriented to person, place, and time.   Psychiatric:         Behavior: Behavior normal.         Thought Content: Thought content normal.        LABS:  WBC   Date Value Ref Range Status   01/04/2024 6.18 3.90 - 12.70 K/uL Final     Hemoglobin   Date Value Ref Range Status   01/04/2024 12.6 12.0 - 16.0 g/dL Final     POC Hematocrit   Date Value Ref Range Status   10/10/2023 32 (L) 36 - 54 %PCV Final     Hematocrit   Date Value Ref Range Status   01/04/2024 43.8 37.0 - 48.5 % Final     Platelets   Date Value Ref Range Status   01/04/2024 311 150 - 450 K/uL Final       Chemistry        Component Value Date/Time     01/04/2024 0430    K 4.5 01/04/2024 0430     01/04/2024 0430    CO2 18 (L) 01/04/2024 0430    BUN 45 (H) 01/04/2024 0430    CREATININE 6.9 (H) 01/04/2024 0430    GLU 75 01/04/2024 0430        Component Value Date/Time    CALCIUM 10.3 01/04/2024 0430    ALKPHOS 606 (H) 01/02/2024 1023    AST 63 (H) 01/02/2024 1023    ALT 28 01/02/2024 1023    BILITOT 1.9 (H) 01/02/2024 1023        X-Ray Wrist Complete Right  Narrative: EXAMINATION:  XR WRIST COMPLETE 3 VIEWS RIGHT    CLINICAL HISTORY:  Unspecified fall, initial encounter    TECHNIQUE:  PA, lateral, and oblique views of the right wrist were performed.    COMPARISON:  None    FINDINGS:  The bone mineralization is within normal limits.  There are acute nondisplaced fractures involving the distal metaphysis of the radius and ulna.    There is joint space narrowing.  There are advanced vascular calcifications.  There is soft tissue swelling about the right wrist.  Impression: Acute nondisplaced fractures involving the distal metaphysis of the radius and ulna.    Extensive vascular  calcifications.    Electronically signed by: Kirk Adhikari MD  Date:    01/16/2024  Time:    15:36      Assessment:       1. Metastatic renal cell carcinoma to intra-abdominal site    2. Metastatic renal cell carcinoma, left            Plan:        Cancer Staging   Renal cell carcinoma of left kidney  Staging form: Kidney, AJCC 8th Edition  - Pathologic stage from 1/12/2016: Stage Unknown (pT1b(2), pNX, cM0) - Unsigned  - Clinical stage from 11/22/2016: Stage IV (rcTX, cN0, pM1) - Signed by Liset Ngo NP on 9/13/2023    Pt has had SD for long time on [place], and last scans were largely stable, however, no longer able to tolerate therapy due to comorbidities (HD/ESRD, etc) and LE infection/wounds.  She was most recently enrolled on hospice but was then told she was not allowed to continue dialysis. Long discussion with patient and her accompanying  regarding current clinical status and that she does not qualify for further RCC treatment. They are in agreement and would like see current status of cancer activity, which is not unreasonable.     RTC 2-3 weeks with CT scan, labs (CBC,CMP,PSA), and to see Dr. Rivera.    Patient was also seen and examined by Dr. Rivera. Patient is in agreement with the proposed treatment plan. All questions were answered to the patient's satisfaction. Pt knows to call clinic if anything is needed before the next clinic visit.      Liset Ngo, MSN, APRN, FNP-C  Hematology and Medical Oncology  Manager- [facility]  Nurse Practitioner/Clinical Investigator, [facility]  Nurse Practitioner to Dr. Ben Rivera      I have reviewed the notes, assessments, and/or procedures performed by the nurse practitioner, as above.  I have personally interviewed the patient at the beside, and rounded with the nurse practitioner. I formulated the plan of care.  I concur with her documentation of Eva Bloom.  I, Dr. Contreras  Miguel, personally spent more than 30 mins during this encounter.     Ben Rivera M.D., M.S., F.A.C.P.  Hematology/Oncology Attending  [facility]             Med Onc Chart Routing      Follow up with physician 3 weeks. RTC 3 weeks with CT scan, labs (CBC,CMP,PSA), and to see Dr. Rivera.   Follow up with [facility]    Infusion scheduling note    Injection scheduling note    Labs    Imaging    Pharmacy appointment    Other referrals

## 2024-01-22 NOTE — PATIENT INSTRUCTIONS
FOLLOW-UP INSTRUCTIONS:    Follow-up with palliative care NP in 4-6 weeks on 3/4/2024@home visit  Continue all medications, treatments, and therapies as ordered  Fall precautions at all times  Maintain Safety precautions at all times  Attend all medical appointments as scheduled  F/u with PCP as needed  Patient to have 6 feet between each other  In an Emergency, call 911 or go to ED; notify PCP's office  9. Limit Risks of environmental exposure to coronavirus as discussed including: social distancing, hand hygiene, and limiting departures from the home for necessities only.   10. Patient not to be left alone  11. Order hospital bed from [facility]  12. Reviewed [place] and left form for  to complete  13. Discussed hospice with ; refused at this time

## 2024-01-22 NOTE — PROGRESS NOTES
[facility]  Medical Chronic Care Home Visit/Palliative Care    Date: 1/22/2024    Encounter Provider: Daphne Evans DNP, FNP-c   PCP: Sowmya Mccain MD  Consult Requested By: Doris Morris    HISTORY OF PRESENT ILLNESS      Patient ID: Eva Bloom is a 58 y.o. female is being seen in the home due to physical debility that presents a taxing effort to leave the home, to mitigate high risk of hospital readmission and/or due to the limited availability of reliable or safe options for transportation to the point of access to the provider. Prior to treatment on this visit the chart was reviewed and patient verbal consent was obtained.    Social History:    Ms. Bloom has been  to her  since 1988 for 36 years, and she has 2 sons. Youngest son lives in [place]. She has 5 grandchildren, and no great   nor patient served in the [place] . Ms. Velasquez worked as a  at [employer] for 23 years, until she became sick. She had kidney removed in 2016. Ms. Velasquez has 5 (siblings) 3 sisters and 2 brothers and they are all living. Patient's father has 4 additional daughters.     Chronic medical conditions synopsis:    Ms. Bloom is being seen today in conjunction with patient's PCP Dr. Mccain, and any significant findings will be shared with PCP.    Today, Ms. Bloom is seen@home to establish palliative care services. Ms. Bloom is sitting on the sofa in the her living room, and her  Otis is present. She is AA&Ox2 person, place. Patient lives at home with her , who assists in her care. She ambulates with walker but requires assistance with all ADLs.  reports she fell 2 weeks and has not had any falls since. Due to fall, she fractured right arm ulnar. No c/o pain at present. She goes to dialysis on T/TH/Saturday @[facility]. Dialysis access is Jan to left chest wall.  Appetite has been fair. Weight fluctuates.     -patient appears thin and  frail;BLE weakness        Ms. Bloom requires a hospital bed due to her requiring positioning of the body in ways not feasible with an ordinary bed to alleviate pain and she has limited mobility and cannot independently make changes in body position without the use of the bed.  The positioning of the body cannot be sufficiently resolved by the use of pillows and wedges.       requires the head of the bed to be elevated more than 30 degrees most of the time due to having a CVA, CHF, high risk for aspiration, and having physical debility & ESRD on dialysis.  The positioning of the body cannot be sufficiently resolved by the use of pillows and wedges. Recently had fall with fx to right ulnar.     Note:    Discussed Hospice with patient's  Mr. Berg. States he had her evaluated but hospice company does not cover dialysis so he does not want hospice at this time. He wants his wife to continue going to dialysis.     DECISION MAKING TODAY       Assessment & Plan:    1. Palliative care encounter  -palliative care referral placed  -initiate palliative care services today  -Reviewed LaPOST Form & left form at patient's home   -LaPOST Form completed on   -CODE STATUS>>>DNR  -Discussed Palliative care and Hospice  -Patient/family want to continue with Palliative care services  -1/22 admit to palliative care  >>reviewed LaPOST and POA forms with >>left form for  to complete>>>wants pt to BE DNR per   -Discussed hospice; wants hospice only if wife can stay on dialysis     2. Cerebrovascular accident (CVA), unspecified mechanism  -     HOSPITAL BED FOR HOME USE  -Turn Q2 hours  -f/u with neurology    3. Chronic combined systolic and diastolic congestive heart failure  -     HOSPITAL BED FOR HOME USE  -continue Metoprolol  -monitor weight and call PCP if weight gain>3 pounds      4. Metastatic renal cell carcinoma to lung, unspecified laterality  -     HOSPITAL BED FOR HOME USE  -f/u with  oncologist  -discussed hospice    5. Counseling regarding advance care planning and goals of care  1/22 Discussed goals of care with  and would like his wife to get stronger and wants her to continue with dialysis    6. ESRD (end stage renal disease) on dialysis  Overview:  - Tuesday, Thursday, and Saturday  - [facility]  - Dr. Hart, nephrology      ENVIRONMENT OF CARE      Family and/or Caregiver present at visit?  Yes  Name of Caregiver: spouse Otis Bloom  History provided by: caregiver    Advance Care Planning   Advanced Care Planning Status:DNR (per )  Patient has had an ACP conversation  Living Will: No  Power of : No  LaPOST: No    Does Caregiver have HCPoA: Yes (not in writing)  Changes today: none       Review of Symptoms      Symptom Assessment (ESAS 0-10 Scale)  Pain:  0  Dyspnea:  0  Anxiety:  0  Nausea:  0  Depression:  0  Anorexia:  0  Fatigue:  5  Insomnia:  5  Restlessness:  5  Agitation:  0     CAM / Delirium:  Negative  Constipation:  Negative  Diarrhea:  Positive    Constipation:  No constipation    Bowel Management Plan (BMP):  No      Comments:  LBM 1/22 loose    Performance Status:  50    Karnofsky Performance Scale:  50%    Living Arrangements:  Lives with spouse    Psychosocial/Cultural:   See Palliative Psychosocial Note: Yes  **Primary  to Follow**  Palliative Care  Consult: No    Spiritual:  F - Justa and Belief:  Scientologist  I - Importance:  Highly  C - Community:  [facility]  A - Address in Care:  No spiritual needs identified      Advance Care Planning   Advance Directives:   Living Will: No        Oral Declaration: No    LaPOST: No (reviewed form with  and pateint; left at house for  to complete)    Do Not Resuscitate Status: No    Medical Power of : No        Oral Declaration: No    Agent's Name:  Otis Bloom ()   Agent's Contact Number:  [phone] cell    Decision Making:  Patient answered  questions and Family answered questions  Goals of Care: What is most important right now is to focus on spending time at home, avoiding the hospital, remaining as independent as possible, quality of life, even if it means sacrificing a little time, improvement in condition but with limits to invasive therapies, comfort and QOL . Accordingly, we have decided that the best plan to meet the patient's goals includes continuing with treatment.       Impression upon entering the home:  Physical Dwelling: single family home   Appearance of home environment: cleaniness: clean, walking pathways: clear, lighting: adequate, and home structure: sound structure  Functional Status: moderate assistance  Mobility: ambulatory with device  Nutritional access:  appetite fluctuates. Fair; drinking boost 1 a day  Home Health: Yes, HH [facility] with nurse, PT,     DME/Supplies: wheelchair, bedside commode, shower chair, and metal walker      Diagnostic tests reviewed/disposition: I have reviewed all completed as well as pending diagnostic tests at the time of discharge.  Disease/illness education: Cancer and ESRD on dialysis  Establishment or re-establishment of referral orders for community resources: No other necessary community resources.   Discussion with other health care providers: No discussion with other health care providers necessary.   Does patient have a PCP at [facility]? Yes   Repatriation plan with PCP? Care at Home reason: mobility   Does patient have an ostomy (ileostomy, colostomy, suprapubic catheter, nephrostomy tube, tracheostomy, PEG tube, pleurex catheter, cholecystostomy, etc)? No  Were BPAs reviewed? Yes    Social History     Socioeconomic History    Marital status:     Number of children: 2   Occupational History    Occupation: [employer]     Employer: [id number]   Tobacco Use    Smoking status: Never     Passive exposure: Never    Smokeless tobacco: Never   Substance and Sexual Activity    Alcohol use: No     Drug use: Yes     Types: Hydrocodone    Sexual activity: Not Currently     Social Determinants of Health     Financial Resource Strain: Low Risk  (9/28/2023)    Overall Financial Resource Strain (CARDIA)     Difficulty of Paying Living Expenses: Not hard at all   Food Insecurity: No Food Insecurity (9/28/2023)    Hunger Vital Sign     Worried About Running Out of Food in the Last Year: Never true     Ran Out of Food in the Last Year: Never true   Transportation Needs: No Transportation Needs (9/28/2023)    PRAPARE - Transportation     Lack of Transportation (Medical): No     Lack of Transportation (Non-Medical): No   Physical Activity: Inactive (9/28/2023)    Exercise Vital Sign     Days of Exercise per Week: 0 days     Minutes of Exercise per Session: 0 min   Stress: No Stress Concern Present (9/28/2023)    [place] [employer] - Occupational Stress Questionnaire     Feeling of Stress : Not at all   Social Connections: Moderately Integrated (9/28/2023)    Social Connection and Isolation Panel [NHANES]     Frequency of Communication with Friends and Family: More than three times a week     Frequency of Social Gatherings with Friends and Family: More than three times a week     Attends Spiritism Services: More than 4 times per year     Active Member of Clubs or Organizations: No     Attends Club or Organization Meetings: Never     Marital Status:    Housing Stability: Low Risk  (9/28/2023)    Housing Stability Vital Sign     Unable to Pay for Housing in the Last Year: No     Number of Places Lived in the Last Year: 1     Unstable Housing in the Last Year: No         OBJECTIVE:     Vital Signs:  Vitals:    01/22/24 1442   BP: 110/78   Pulse: 67   Resp: 18   Temp: 97.8 °F (36.6 °C)       Review of Systems   Constitutional:  Positive for appetite change (not eating as much) and fatigue. Negative for activity change.   HENT:  Positive for rhinorrhea and sinus pain. Negative for trouble  swallowing.    Respiratory:  Positive for cough (np cough at times). Negative for choking, chest tightness and shortness of breath.    Cardiovascular:  Negative for chest pain and leg swelling.   Gastrointestinal:  Negative for abdominal pain, constipation and diarrhea.   Genitourinary:  Positive for dysuria.        Does not make urine   Musculoskeletal:  Positive for gait problem. Negative for back pain and neck pain.        Fx right arm with splint/ace wrap   Skin:  Negative for rash and wound.   Neurological:  Positive for weakness. Negative for dizziness, tremors and seizures.        Right arm weakness had AVF  New access chest   Psychiatric/Behavioral:  Positive for confusion (at night), hallucinations and sleep disturbance.        Physical Exam  Vitals and nursing note reviewed. Exam conducted with a chaperone present.   Constitutional:       Appearance: Normal appearance.   HENT:      Head: Normocephalic and atraumatic.      Right Ear: There is no impacted cerumen.      Left Ear: There is no impacted cerumen.      Nose: Rhinorrhea present.      Mouth/Throat:      Mouth: Mucous membranes are moist.   Eyes:      Extraocular Movements: Extraocular movements intact.      Conjunctiva/sclera: Conjunctivae normal.   Cardiovascular:      Rate and Rhythm: Normal rate and regular rhythm.      Pulses: Normal pulses.      Heart sounds: Murmur (grade III/VI) heard.   Pulmonary:      Effort: Pulmonary effort is normal.      Breath sounds: Normal breath sounds.      Comments: BBS CTA  Abdominal:      General: Bowel sounds are normal.      Palpations: Abdomen is soft.   Genitourinary:     Comments: Anuric   Musculoskeletal:         General: Swelling present. Normal range of motion.      Cervical back: Normal range of motion and neck supple.      Right lower leg: No edema.      Left lower leg: No edema.      Comments: Limited ROM to right arm/right wrist; swelling to fingers   Skin:     General: Skin is warm and dry.       Capillary Refill: Capillary refill takes less than 2 seconds.      Coloration: Skin is pale.      Comments: Left CW Jan catheter   Neurological:      Mental Status: She is alert.      Motor: Weakness present.      Gait: Gait abnormal.      Comments: AA&Ox1-2 person, place   Psychiatric:         Mood and Affect: Mood normal.         Behavior: Behavior normal.         INSTRUCTIONS FOR PATIENT:     Scheduled Follow-up, Appts Reviewed with Modifications if Needed: Yes  Future Appointments   Date Time Provider Department Center   2/5/2024  9:15 AM Corby Casey MD [facility]AS [facility]   2/12/2024  8:30 AM Ana Yeh MD [facility] ORTHO [place] [facility]   2/16/2024  7:40 AM Alize Ross DO [facility] FAM MED [place]   2/16/2024  8:15 [facility] [place]   2/16/2024  8:55 AM [facility], [facility]   2/16/2024 10:20 AM [facility] CT2 LIMIT 500 LBS [facility] [place] [facility]   2/19/2024 10:30 AM Ben Rivera MD [facility] HEMONC3 [place]   3/4/2024  8:00 AM Daphne Evans NP [address]         Current Outpatient Medications:     acetaminophen (TYLENOL) 500 MG tablet, Take 1 tablet (500 mg total) by mouth every 4 (four) hours as needed for Pain or Temperature greater than (100.5 or greater)., Disp: 30 tablet, Rfl: 0    albuterol-ipratropium (DUO-NEB) 2.5 mg-0.5 mg/3 mL nebulizer solution, Take by nebulization., Disp: , Rfl:     apixaban (ELIQUIS) 2.5 mg Tab, Take 1 tablet (2.5 mg total) by mouth 2 (two) times daily., Disp: 60 tablet, Rfl: 11    aspirin (ECOTRIN) 81 MG EC tablet, Take 1 tablet (81 mg total) by mouth once daily., Disp: 90 tablet, Rfl: 3    atorvastatin (LIPITOR) 40 MG tablet, Take 1 tablet (40 mg total) by mouth once daily., Disp: 90 tablet, Rfl: 0    bisacodyL (DULCOLAX) 10 mg Supp, Place 10 mg rectally., Disp: , Rfl:     levothyroxine (SYNTHROID) 75 MCG tablet, Take 1 tablet (75 mcg total) by mouth once daily., Disp: 30 tablet, Rfl: 11    metoprolol  succinate (TOPROL-XL) 25 MG 24 hr tablet, Take 1 tablet (25 mg total) by mouth once daily., Disp: 30 tablet, Rfl: 11    mirtazapine (REMERON) 7.5 MG Tab, Take 1 tablet (7.5 mg total) by mouth every evening., Disp: 30 tablet, Rfl: 11    nystatin (MYCOSTATIN) cream, SMARTSIG:sparingly Topical 2-4 Times Daily PRN, Disp: , Rfl:     ondansetron (ZOFRAN-ODT) 4 MG TbDL, Take by mouth., Disp: , Rfl:     oxyCODONE (ROXICODONE) 5 MG immediate release tablet, Take 1 tablet (5 mg total) by mouth every 6 (six) hours as needed for Pain., Disp: 11 tablet, Rfl: 0    acetaminophen (TYLENOL) 500 MG tablet, Take 500 mg by mouth every 6 (six) hours as needed for Pain., Disp: , Rfl:     cabozantinib (CABOMETYX) 60 mg Tab, TAKE ONE TABLET BY MOUTH ONCE DAILY AT THE SAME TIME ON AN EMPTY STOMACH AT LEAST 1 HOUR BEFORE OR 2 HOURS AFTER EATING. AVOID GRAPEFRUIT PRODUCTS, Disp: 30 tablet, Rfl: 4    cyclobenzaprine (FLEXERIL) 5 MG tablet, TAKE 1 TABLET BY MOUTH DAILY AS NEEDED FOR MUSCLE SPASMS., Disp: 30 tablet, Rfl: 1    hyoscyamine (LEVSIN/SL) 0.125 mg Subl, Place under the tongue., Disp: , Rfl:     lanthanum (FOSRENOL) 1000 MG chewable tablet, Take 1 tablet by mouth., Disp: , Rfl:     lidocaine-prilocaine (EMLA) cream, APPLY ATLEAST 30 MINUTES BEFORE TREATMENT 3 TIMES A WEEK, Disp: 30 g, Rfl: 11    LORAZEPAM INTENSOL 2 mg/mL Conc, Take by mouth., Disp: , Rfl:     mv,Ca,min-folic acid-vit K1 (ONE-A-DAY WOMEN'S 50 PLUS) 400-20 mcg Tab, Take 1 tablet by mouth once daily., Disp: , Rfl:     naloxone (NARCAN) 1 mg/mL injection, 2 mg (1 mg per nostril) by Nasal route as needed for opioid overdose; may repeat in 3 to 5 minutes if not effective. Call 911, Disp: 2 mL, Rfl: 11    pantoprazole (PROTONIX) 40 MG tablet, Take 1 tablet (40 mg total) by mouth 2 (two) times daily., Disp: 60 tablet, Rfl: 11    traMADoL (ULTRAM) 50 mg tablet, Take 1 tablet (50 mg total) by mouth every 12 (twelve) hours as needed for Pain., Disp: 30 each, Rfl: 1  No current  facility-administered medications for this visit.    Facility-Administered Medications Ordered in Other Visits:     0.9%  NaCl infusion, , Intravenous, Continuous, Soni Bhatti MD, New Bag at 07/21/23 1116    Medication Reconciliation:  Were medications changed during this appointment? No  Were medications in the home? Yes  Is the patient taking the medications as directed? Yes  Does the patient/caregiver understand the medications and changes? Yes  Does updated med list accurately reflects meds patient is currently taking? Yes    Total clinical care time was 60min. Reviewed Chart and PCP's notes then the following issues were discussed:  PMHx, PSHx, social history, reviewed medications, CVA, chronic combined systolic and diastolic congestive heart failure, metastatic renal cell carcinoma to lung, ESRD on dialysis. Discussed hospice and benefits. Discussed hospice philosophy    An additional 30 minutes of the visit was spent in advanced care planning.  Explained to patient what palliative care is and palliative care vs home health and hospice. Reviewed LaPOST and AD with patient. Discussed goals of care       Daphne Evans DNP, FNP-C  [facility]  [phone]

## 2024-01-24 DIAGNOSIS — C79.89 METASTATIC RENAL CELL CARCINOMA TO INTRA-ABDOMINAL SITE: Primary | ICD-10-CM

## 2024-01-24 DIAGNOSIS — C64.9 METASTATIC RENAL CELL CARCINOMA TO INTRA-ABDOMINAL SITE: Primary | ICD-10-CM

## 2024-01-24 DIAGNOSIS — R97.0 ELEVATED CARCINOEMBRYONIC ANTIGEN (CEA): ICD-10-CM

## 2024-01-26 ENCOUNTER — OFFICE VISIT (OUTPATIENT)
Dept: PODIATRY | Facility: CLINIC | Age: 59
End: 2024-01-26
Payer: MEDICARE

## 2024-01-26 VITALS
HEART RATE: 99 BPM | BODY MASS INDEX: 15.71 KG/M2 | HEIGHT: 64 IN | DIASTOLIC BLOOD PRESSURE: 87 MMHG | WEIGHT: 92 LBS | SYSTOLIC BLOOD PRESSURE: 125 MMHG

## 2024-01-26 DIAGNOSIS — L97.529 ISCHEMIC TOE ULCER, LEFT, WITH UNSPECIFIED SEVERITY: Primary | ICD-10-CM

## 2024-01-26 DIAGNOSIS — L97.429 HEEL ULCER, LEFT, WITH UNSPECIFIED SEVERITY: ICD-10-CM

## 2024-01-26 DIAGNOSIS — I73.9 PAD (PERIPHERAL ARTERY DISEASE): ICD-10-CM

## 2024-01-26 PROCEDURE — 99213 OFFICE O/P EST LOW 20 MIN: CPT | Mod: S$PBB,GW,, | Performed by: PODIATRIST

## 2024-01-26 PROCEDURE — 99214 OFFICE O/P EST MOD 30 MIN: CPT | Mod: PBBFAC,PO | Performed by: PODIATRIST

## 2024-01-26 PROCEDURE — 99999 PR PBB SHADOW E&M-EST. PATIENT-LVL IV: CPT | Mod: PBBFAC,,, | Performed by: PODIATRIST

## 2024-01-26 NOTE — PROGRESS NOTES
Subjective:      Patient ID: Eva Bloom is a 58 y.o. female.    Chief Complaint: Wound Care    Ulcer/gangrene left 3rd toe (1st toe left healed, pip area 3rd toe healed).  Gradual onset, improving slowly over past 8 weeks, aggravated by increased weight bearing, shoe gear, pressure.  Betadine swabs, open toe shoes, angioplasty all helping.. Denies trauma, surgery.    Increased pain back of left heel.  Gradual onset, worsening over past several dayss, aggravated by increased weight bearing, shoe gear, pressure.  Using heel offload boot at rest.  Swabbing left heel daily with betadine.    7/27/2023 angioplasty successfuly        Review of Systems   Constitutional: Negative for chills, diaphoresis, fever, malaise/fatigue and night sweats.   Cardiovascular:  Positive for leg swelling. Negative for claudication, cyanosis and syncope.   Skin:  Positive for poor wound healing. Negative for color change, dry skin, nail changes, rash, suspicious lesions and unusual hair distribution.   Musculoskeletal:  Negative for falls, joint pain, joint swelling, muscle cramps, muscle weakness and stiffness.   Gastrointestinal:  Negative for constipation, diarrhea, nausea and vomiting.   Neurological:  Positive for sensory change. Negative for brief paralysis, disturbances in coordination, focal weakness, numbness, paresthesias and tremors.           Objective:      Physical Exam  Constitutional:       General: She is not in acute distress.     Appearance: She is well-developed. She is not diaphoretic.   Cardiovascular:      Pulses:           Dorsalis pedis pulses are 1+ on the right side and 1+ on the left side.        Posterior tibial pulses are 1+ on the right side and 1+ on the left side.      Comments: Capillary refill 3 seconds all toes/distal feet, all toes/both feet warm to touch.      Negative lymphadenopathy bilateral popliteal fossa and tarsal tunnel.      <2+ pitting lower extremity edema bilateral.    Musculoskeletal:       Right ankle: No swelling, deformity, ecchymosis or lacerations. Normal range of motion. Normal pulse.      Right Achilles Tendon: Normal. No defects. Manning's test negative.      Comments: Relates left leg/thigh weakness - not tested in office today.   Lymphadenopathy:      Lower Body: No right inguinal adenopathy. No left inguinal adenopathy.      Comments: Negative lymphadenopathy bilateral popliteal fossa and tarsal tunnel.    Negative lymphangitic streaking bilateral feet/ankles/legs.   Skin:     General: Skin is warm and dry.      Capillary Refill: Capillary refill takes 2 to 3 seconds.      Coloration: Skin is not pale.      Findings: No abrasion, bruising, burn, ecchymosis, erythema, laceration, lesion or rash.      Nails: There is no clubbing.      Comments:   Focal area dorsal medial left 3rd  toe dipj dry indurated gangrene demarcating  without open skin ulceration, drainage, pus, tracking, fluctuance, malodor, or cardinal signs infection.    Plantar posterior dry black escharotic area left heel  without open skin, drainage, pus, tracking, fluctuance, malodor, or cardinal signs infection.       New ulcer posterior inferior left heel black eschar  without ulceration, drainage, pus, tracking, fluctuance, malodor, or cardinal signs infection.        Otherwise, Skin thin, shiny, atrophic, with decreased density and distribution of pedal hair bilateral, but without hyperpigmentation, daniele discoloration,  ulcers, masses, nodules or cords palpated bilateral feet and legs.    Neurological:      Mental Status: She is alert and oriented to person, place, and time.      Sensory: No sensory deficit.      Motor: No tremor, atrophy or abnormal muscle tone.      Gait: Gait normal.      Comments: Negative tinel sign to percussion sural, superficial peroneal, deep peroneal, saphenous, and posterior tibial nerves right and left ankles and feet.     Psychiatric:         Behavior: Behavior is cooperative.              Assessment:       Encounter Diagnoses   Name Primary?    Ischemic toe ulcer, left, with unspecified severity Yes    PAD (peripheral artery disease)     Heel ulcer, left, with unspecified severity            Plan:       Eva was seen today for wound care.    Diagnoses and all orders for this visit:    Ischemic toe ulcer, left, with unspecified severity    PAD (peripheral artery disease)    Heel ulcer, left, with unspecified severity        I counseled the patient on her conditions, their implications and medical management.    Continue betadine swabs daily left 3rd toe and heel.  Cover with band aid if desired.  Continue open toe shoe (surgical shoes bilateral today)    Heel offload boots all times stationary.     Re consult vascualr surgery - any possible improvement in circulation?  Heel ulcer is slowly worsening.  Note to Dr. Muñoz.    Continue HH.    2 week, assessment/evaluation, sooner prn.          No follow-ups on file.

## 2024-01-26 NOTE — Clinical Note
Dr. Toni Anderson.  Is there any further possible revascularization options for Ms. Walker?  She has a dry escharotic heel wound that is worsening on the left side.  I appreciate your help.  Basim

## 2024-01-30 VITALS
OXYGEN SATURATION: 100 % | SYSTOLIC BLOOD PRESSURE: 110 MMHG | DIASTOLIC BLOOD PRESSURE: 78 MMHG | HEART RATE: 67 BPM | RESPIRATION RATE: 18 BRPM | TEMPERATURE: 98 F

## 2024-01-31 ENCOUNTER — OFFICE VISIT (OUTPATIENT)
Dept: ORTHOPEDICS | Facility: CLINIC | Age: 59
End: 2024-01-31
Payer: MEDICARE

## 2024-01-31 ENCOUNTER — TELEPHONE (OUTPATIENT)
Dept: HOME HEALTH SERVICES | Facility: CLINIC | Age: 59
End: 2024-01-31
Payer: MEDICARE

## 2024-01-31 DIAGNOSIS — M25.531 RIGHT WRIST PAIN: Primary | ICD-10-CM

## 2024-01-31 DIAGNOSIS — S52.501A CLOSED FRACTURE OF DISTAL ENDS OF RIGHT RADIUS AND ULNA, INITIAL ENCOUNTER: ICD-10-CM

## 2024-01-31 DIAGNOSIS — S52.601A CLOSED FRACTURE OF DISTAL ENDS OF RIGHT RADIUS AND ULNA, INITIAL ENCOUNTER: ICD-10-CM

## 2024-01-31 DIAGNOSIS — S52.601A CLOSED FRACTURE OF DISTAL ENDS OF RIGHT RADIUS AND ULNA, INITIAL ENCOUNTER: Primary | ICD-10-CM

## 2024-01-31 DIAGNOSIS — S52.501A CLOSED FRACTURE OF DISTAL ENDS OF RIGHT RADIUS AND ULNA, INITIAL ENCOUNTER: Primary | ICD-10-CM

## 2024-01-31 PROCEDURE — 99999 PR PBB SHADOW E&M-EST. PATIENT-LVL IV: CPT | Mod: PBBFAC,,, | Performed by: ORTHOPAEDIC SURGERY

## 2024-01-31 PROCEDURE — 99214 OFFICE O/P EST MOD 30 MIN: CPT | Mod: PBBFAC,PN | Performed by: ORTHOPAEDIC SURGERY

## 2024-01-31 PROCEDURE — 99203 OFFICE O/P NEW LOW 30 MIN: CPT | Mod: S$PBB,GW,, | Performed by: ORTHOPAEDIC SURGERY

## 2024-01-31 NOTE — PROGRESS NOTES
Assessment: 58 y.o. female with R non displaced DR fracture    I explained my diagnostic impression and the reasoning behind it in detail, using layman's terms.      Plan:   - ROM hand  - NWB RUE   - EXOS splint  - Return to clinic in 2 weeks with new xrays. Return sooner if splints becomes loose as hand swelling goes down      All questions were answered in detail. The patient is in full agreement with the treatment plan and will proceed accordingly.    Chief Complaint   Patient presents with    Right Wrist - Injury, Pain, Swelling       Initial visit (1/31/23): Eva Bloom is a 58 y.o. female who presents today complaining of Injury, Pain, and Swelling of the Right Wrist     DOI: 1/16/24  Fall   Sustained fracture of R DR  Splinted   Here for follow up  Denies numbness, tingling       This patient was seen in consultation at the request of Ricco Erickson    This is the extent of the patient's complaints at this time.     Hand dominance: Right          Review of patient's allergies indicates:   Allergen Reactions    Carvedilol Other (See Comments)     Nausea/vomiting    Allopurinol      Other reaction(s): abnormal transaminases         Current Outpatient Medications:     acetaminophen (TYLENOL) 500 MG tablet, Take 500 mg by mouth every 6 (six) hours as needed for Pain., Disp: , Rfl:     acetaminophen (TYLENOL) 500 MG tablet, Take 1 tablet (500 mg total) by mouth every 4 (four) hours as needed for Pain or Temperature greater than (100.5 or greater)., Disp: 30 tablet, Rfl: 0    albuterol-ipratropium (DUO-NEB) 2.5 mg-0.5 mg/3 mL nebulizer solution, Take by nebulization., Disp: , Rfl:     apixaban (ELIQUIS) 2.5 mg Tab, Take 1 tablet (2.5 mg total) by mouth 2 (two) times daily., Disp: 60 tablet, Rfl: 11    aspirin (ECOTRIN) 81 MG EC tablet, Take 1 tablet (81 mg total) by mouth once daily., Disp: 90 tablet, Rfl: 3    atorvastatin (LIPITOR) 40 MG tablet, Take 1 tablet (40 mg total) by mouth once daily., Disp: 90  tablet, Rfl: 0    bisacodyL (DULCOLAX) 10 mg Supp, Place 10 mg rectally., Disp: , Rfl:     cabozantinib (CABOMETYX) 60 mg Tab, TAKE ONE TABLET BY MOUTH ONCE DAILY AT THE SAME TIME ON AN EMPTY STOMACH AT LEAST 1 HOUR BEFORE OR 2 HOURS AFTER EATING. AVOID GRAPEFRUIT PRODUCTS, Disp: 30 tablet, Rfl: 4    cyclobenzaprine (FLEXERIL) 5 MG tablet, TAKE 1 TABLET BY MOUTH DAILY AS NEEDED FOR MUSCLE SPASMS., Disp: 30 tablet, Rfl: 1    hyoscyamine (LEVSIN/SL) 0.125 mg Subl, Place under the tongue., Disp: , Rfl:     lanthanum (FOSRENOL) 1000 MG chewable tablet, Take 1 tablet by mouth., Disp: , Rfl:     levothyroxine (SYNTHROID) 75 MCG tablet, Take 1 tablet (75 mcg total) by mouth once daily., Disp: 30 tablet, Rfl: 11    lidocaine-prilocaine (EMLA) cream, APPLY ATLEAST 30 MINUTES BEFORE TREATMENT 3 TIMES A WEEK, Disp: 30 g, Rfl: 11    LORAZEPAM INTENSOL 2 mg/mL Conc, Take by mouth., Disp: , Rfl:     metoprolol succinate (TOPROL-XL) 25 MG 24 hr tablet, Take 1 tablet (25 mg total) by mouth once daily., Disp: 30 tablet, Rfl: 11    mirtazapine (REMERON) 7.5 MG Tab, Take 1 tablet (7.5 mg total) by mouth every evening., Disp: 30 tablet, Rfl: 11    mv,Ca,min-folic acid-vit K1 (ONE-A-DAY WOMEN'S 50 PLUS) 400-20 mcg Tab, Take 1 tablet by mouth once daily., Disp: , Rfl:     naloxone (NARCAN) 1 mg/mL injection, 2 mg (1 mg per nostril) by Nasal route as needed for opioid overdose; may repeat in 3 to 5 minutes if not effective. Call 911, Disp: 2 mL, Rfl: 11    nystatin (MYCOSTATIN) cream, SMARTSIG:sparingly Topical 2-4 Times Daily PRN, Disp: , Rfl:     ondansetron (ZOFRAN-ODT) 4 MG TbDL, Take by mouth., Disp: , Rfl:     oxyCODONE (ROXICODONE) 5 MG immediate release tablet, Take 1 tablet (5 mg total) by mouth every 6 (six) hours as needed for Pain., Disp: 11 tablet, Rfl: 0    pantoprazole (PROTONIX) 40 MG tablet, Take 1 tablet (40 mg total) by mouth 2 (two) times daily., Disp: 60 tablet, Rfl: 11    traMADoL (ULTRAM) 50 mg tablet, Take 1 tablet  (50 mg total) by mouth every 12 (twelve) hours as needed for Pain., Disp: 30 each, Rfl: 1  No current facility-administered medications for this visit.    Facility-Administered Medications Ordered in Other Visits:     0.9%  NaCl infusion, , Intravenous, Continuous, Soni Bhatti MD, New Bag at 07/21/23 1116    Physical Exam:   Vitals:    01/31/24 0914   PainSc:   6   PainLoc: Wrist       General:  Patient is alert, awake and oriented to time, place and person. Mood and affect are appropriate.  Patient does not appear to be in any distress, denies any constitutional symptoms and appears stated age.   HEENT:  Pupils are equal and round, sclera are not injected. External examination of ears and nose reveals no abnormalities. Cranial nerves II-X are grossly intact  Skin:  no rashes, abrasions or open wounds on the affected extremity   Resp:  No respiratory distress or audible wheezing   CV: 2+  pulses, all extremities warm and well perfused   Right Wrist   + hand swelling   TTP over DR  NTTP over DU   LTSI m/u/r  2+ RP  + EPL, IO, FDS, FDP       Imaging: 3 views of the right wrist show vascular calcifications, non displaced fracture of the distal radius    I personally reviewed and interpreted the patient's imaging obtained today in clinic       A note notifying Ricco Erickson of my findings was sent via the electronic medical record     This note was created by combination of typed  and M-Modal dictation. Transcription and phonetic errors may be present.  If there are any questions, please contact me.    Past Medical History:   Diagnosis Date    Anemia     Anticoagulant long-term use     Atrial fibrillation     Bronchitis 03/01/2017    Cancer 2016    kidney cancer    CHF (congestive heart failure), NYHA class II, chronic, systolic     CMV (cytomegalovirus) antibody positive     CVA (cerebral vascular accident) 1/3/2024    Encounter for blood transfusion     ESRD (end stage renal disease)     Essential  hypertension 09/23/2015    H/O herpes simplex type 2 infection     Herpes simplex type 1 antibody positive     History of kidney cancer     s/p left nephrectomy 1/2016    Hyperparathyroidism, unspecified     Hyperuricemia without signs of inflammatory arthritis and tophaceous disease     Kidney stones     LGSIL (low grade squamous intraepithelial dysplasia)     Myocardiopathy 07/21/2017    Prediabetes     Proteinuria     Renal disorder     Thyroid nodule     Urate nephropathy        Active Problem List with Overview Notes    Diagnosis Date Noted    Incontinence associated dermatitis 01/04/2024    CVA (cerebral vascular accident) 01/03/2024    Acute metabolic encephalopathy 01/02/2024    Acute blood loss anemia 11/01/2023    PAD (peripheral artery disease) 11/01/2023    Hypothermia 10/12/2023    Severe protein-calorie malnutrition 10/11/2023    ACP (advance care planning) 10/11/2023    Weight loss 10/10/2023    Body mass index (BMI) less than 19 09/28/2023    Hoarseness of voice 09/21/2023    Sensation of fullness in both ears 09/21/2023    Chronic combined systolic and diastolic heart failure 08/25/2023    Metastatic renal cell carcinoma to lung, unspecified laterality 08/11/2023    Hemorrhoid 07/24/2023    Critical limb ischemia of left lower extremity with gangrene 07/20/2023    Ischemic ulcer of toe of left foot, with unspecified severity 07/19/2023    Hypothyroidism 07/19/2023    Aortic atherosclerosis 07/06/2023    Arteriovenous graft infection 01/24/2023    Malfunction of arteriovenous dialysis fistula 12/16/2022    Debility 11/04/2022    PAF (paroxysmal atrial fibrillation) 07/25/2022    Glucose intolerance 04/18/2022    Hypercalcemia 04/04/2022    ICD (implantable cardioverter-defibrillator) in place - SubQ 07/15/2021    Hyperphosphatemia     Anemia in ESRD (end-stage renal disease)     Chronic kidney disease-mineral and bone disorder     Chronic combined systolic and diastolic congestive heart failure      "Nonrheumatic mitral valve regurgitation 06/01/2020    ESRD (end stage renal disease) on dialysis      - Tuesday, Thursday, and Saturday  - Chelsie Hart, nephrology        Cardiomyopathy, nonischemic 07/21/2017    Pulmonary hypertension 07/21/2017    Primary insomnia 06/14/2017    Stenosis of arteriovenous dialysis fistula 02/17/2017    S/p nephrectomy left 10/07/2016    History of kidney cancer 09/02/2016    Renal cell carcinoma of left kidney 01/27/2016    Multinodular goiter 11/25/2015     - benign FNA 12/2015        CMV (cytomegalovirus) antibody positive     Herpes simplex type 1 antibody positive     H/O herpes simplex type 2 infection      - positive antibody test  - reports no hx of outbreaks        Essential hypertension 09/23/2015    Pap smear abnormality of cervix with ASCUS favoring benign 09/23/2015     She is having the PAP "once a year and comes back ok she says"        Urate nephropathy     Kidney stones     Hyperparathyroidism, secondary renal     Hyperuricemia without signs of inflammatory arthritis and tophaceous disease     Proteinuria        Past Surgical History:   Procedure Laterality Date    BREAST CYST EXCISION      COLONOSCOPY N/A 11/12/2015    COLONOSCOPY N/A 3/12/2021    Procedure: COLONOSCOPY;  Surgeon: Brendon Lanier MD;  Location: [facility];  Service: Endoscopy;  Laterality: N/A;  covid test 3/9, labs prior, prep instr mailed -ml    EXCISION OF ARTERIOVENOUS FISTULA Left 9/27/2023    Procedure: EXCISION, AV FISTULA;  Surgeon: FARIDEH Muñoz III, MD;  Location: [facility] OR [address] [place] [address];  Service: Vascular;  Laterality: Left;  LUE AV graft excision    INSERTION OF BIVENTRICULAR IMPLANTABLE CARDIOVERTER-DEFIBRILLATOR (ICD)  04/2021    INSERTION OF TUNNELED CENTRAL VENOUS CATHETER (CVC) WITH SUBCUTANEOUS PORT N/A 1/25/2023    Procedure: INSERTION, DUAL LUMEN CATHETER WITH PORT, WITH IMAGING GUIDANCE;  Surgeon: FARIDEH Muñoz III, MD;  Location: [facility] OR [address] [place] [address];  Service: " Peripheral Vascular;  Laterality: N/A;  possinble permcath placment    NEPHRECTOMY-LAPAROSCOPIC Left 01/12/2016    PERCUTANEOUS TRANSLUMINAL ANGIOPLASTY OF ARTERIOVENOUS FISTULA Left 4/19/2023    Procedure: PTA, AV FISTULA;  Surgeon: FARIDEH Muñoz III, MD;  Location: [facility] CATH LAB;  Service: Peripheral Vascular;  Laterality: Left;    PERITONEAL CATHETER INSERTION      Permacath insertion  01/12/2017    PLACEMENT OF ARTERIOVENOUS GRAFT  12/28/2022    Procedure: INSERTION, GRAFT, ARTERIOVENOUS;  Surgeon: FARIDEH Muñoz III, MD;  [facility];  Service: Peripheral Vascular;;    REMOVAL, GRAFT, ARTERIOVENOUS, UPPER EXTREMITY Left 1/25/2023    Procedure: REMOVAL, GRAFT, ARTERIOVENOUS, UPPER EXTREMITY;  Surgeon: FARIDEH Muñoz III, MD;  [facility];  Service: Peripheral Vascular;  Laterality: Left;    REVISION OF ARTERIOVENOUS FISTULA Left 5/1/2023    Procedure: REVISION, AV FISTULA;  Surgeon: FARIDEH Muñoz III, MD;  [facility];  Service: Peripheral Vascular;  Laterality: Left;  LUE AVG revision    REVISION OF PROCEDURE INVOLVING ARTERIOVENOUS GRAFT Left 12/28/2022    Procedure: REVISION, PROCEDURE INVOLVING ARTERIOVENOUS GRAFT;  Surgeon: FARIDEH Muñoz III, MD;  [facility];  Service: Peripheral Vascular;  Laterality: Left;    REVISION OF PROCEDURE INVOLVING ARTERIOVENOUS GRAFT Left 7/21/2023    Procedure: LEFT ARM ARTERIOVENOUS GRAFT EXCISION  AND LIGATION;  Surgeon: Obi Werner MD;  Location: [facility];  Service: Vascular;  Laterality: Left;  Left AVG excision and revision with interposition    SALPINGOOPHORECTOMY Right 2016    KJB---DAVINCI    TONSILLECTOMY      TUBAL LIGATION         Family History   Problem Relation Age of Onset    Hypertension Mother     Diabetes Father     Kidney disease Father     No Known Problems Sister     Heart disease Sister     No Known Problems Sister     No Known Problems Brother     No Known Problems Brother      Cataracts Maternal Aunt     No Known Problems Maternal Uncle     No Known Problems Paternal Aunt     No Known Problems Paternal Uncle     No Known Problems Maternal Grandmother     Diabetes Maternal Grandfather     No Known Problems Paternal Grandmother     No Known Problems Paternal Grandfather     No Known Problems Son     No Known Problems Son     No Known Problems Other     Breast cancer Neg Hx     Colon cancer Neg Hx     Cancer Neg Hx     Stroke Neg Hx     Amblyopia Neg Hx     Blindness Neg Hx     Glaucoma Neg Hx     Macular degeneration Neg Hx     Retinal detachment Neg Hx     Strabismus Neg Hx     Thyroid disease Neg Hx

## 2024-01-31 NOTE — TELEPHONE ENCOUNTER
Faxed orders, notes, and demographics to [facility] for hospital bed per NP request.  Fax confirmation received.

## 2024-02-01 DIAGNOSIS — E03.9 HYPOTHYROIDISM, UNSPECIFIED TYPE: ICD-10-CM

## 2024-02-01 RX ORDER — LEVOTHYROXINE SODIUM 75 UG/1
75 TABLET ORAL
Qty: 90 TABLET | Refills: 3 | Status: SHIPPED | OUTPATIENT
Start: 2024-02-01 | End: 2024-05-15 | Stop reason: SDUPTHER

## 2024-02-05 ENCOUNTER — INITIAL CONSULT (OUTPATIENT)
Dept: VASCULAR SURGERY | Facility: CLINIC | Age: 59
End: 2024-02-05
Payer: MEDICARE

## 2024-02-05 ENCOUNTER — TELEPHONE (OUTPATIENT)
Dept: VASCULAR SURGERY | Facility: CLINIC | Age: 59
End: 2024-02-05

## 2024-02-05 VITALS
SYSTOLIC BLOOD PRESSURE: 132 MMHG | HEART RATE: 103 BPM | DIASTOLIC BLOOD PRESSURE: 88 MMHG | HEIGHT: 64 IN | WEIGHT: 91.94 LBS | BODY MASS INDEX: 15.69 KG/M2

## 2024-02-05 DIAGNOSIS — I73.9 PAD (PERIPHERAL ARTERY DISEASE): ICD-10-CM

## 2024-02-05 DIAGNOSIS — L97.429 HEEL ULCER, LEFT, WITH UNSPECIFIED SEVERITY: ICD-10-CM

## 2024-02-05 DIAGNOSIS — L97.529 ISCHEMIC TOE ULCER, LEFT, WITH UNSPECIFIED SEVERITY: ICD-10-CM

## 2024-02-05 PROCEDURE — 99999 PR PBB SHADOW E&M-EST. PATIENT-LVL IV: CPT | Mod: PBBFAC,,, | Performed by: SURGERY

## 2024-02-05 PROCEDURE — 99214 OFFICE O/P EST MOD 30 MIN: CPT | Mod: GW,S$PBB,, | Performed by: SURGERY

## 2024-02-05 PROCEDURE — 99214 OFFICE O/P EST MOD 30 MIN: CPT | Mod: PBBFAC | Performed by: SURGERY

## 2024-02-05 NOTE — PROGRESS NOTES
VASCULAR SURGERY SERVICE    CHIEF COMPLAINT:  Functional left AV graft dysfunctional left AV graft    HISTORY OF PRESENT ILLNESS: Eva Bloom is a 58 y.o. female end-stage renal disease, with multiple serious medical comorbidities including admission for sepsis 2 months ago, EF 20%, AFib on Eliquis, who is having increasing bleeding after her dialysis runs.  I placed a revision, left AV graft with long interposition Accu Seal 12/28/2022.  She has had uninterrupted use of this graft ever since.    Recent weeks she is had increased bleeding after dialysis sticks    S/p:    1aaa.  Excision, left AV graft for infection 9/27/2023 (Perez) (07/21/2023, Dr. Werner)  1aa. Reclosure, L AVG conter-incision 5/1/2023  1a. PTA, L AVG 4/19/2023  Excision, excluded L AVG and permacath 1/25/2023  revision, left AV graft with long interposition Accu Seal 12/28/2022  Original left AV graft 2017 with subsequent covered stent placement in venous outflow    09/1/2023:  She now returns after excision of the left AV graft for infection proximally 5 weeks ago.  She is been dialyzing through a PermCath.    09/22/2023:  Now returns with breakdown of prior counter incision for graft excision.  Notably, she has metastatic renal cell carcinoma to the lungs    10/20/2023:  She now returns after excision of the left AV graft remnant.  She is continued to deteriorate with weight loss, now BMI 13, was recently hospitalized for her declining state, has now been put on hospice.  This point she is still dialyzing through a PermCath    2/2024:  referred by Dr. Farooq for L 3rd toe wound.  Pt has resumed Oncologic care after stopping hospice care and desires to proceed with ulcer healing treatment.    Past Medical History:   Diagnosis Date    Anemia     Anticoagulant long-term use     Atrial fibrillation     Bronchitis 03/01/2017    Cancer 2016    kidney cancer    CHF (congestive heart failure), NYHA class II, chronic, systolic     CMV  (cytomegalovirus) antibody positive     CVA (cerebral vascular accident) 1/3/2024    Encounter for blood transfusion     ESRD (end stage renal disease)     Essential hypertension 09/23/2015    H/O herpes simplex type 2 infection     Herpes simplex type 1 antibody positive     History of kidney cancer     s/p left nephrectomy 1/2016    Hyperparathyroidism, unspecified     Hyperuricemia without signs of inflammatory arthritis and tophaceous disease     Kidney stones     LGSIL (low grade squamous intraepithelial dysplasia)     Myocardiopathy 07/21/2017    Prediabetes     Proteinuria     Renal disorder     Thyroid nodule     Urate nephropathy        Past Surgical History:   Procedure Laterality Date    BREAST CYST EXCISION      COLONOSCOPY N/A 11/12/2015    COLONOSCOPY N/A 3/12/2021    Procedure: COLONOSCOPY;  Surgeon: Brendon Lanier MD;  Location: [facility] ENDO;  Service: Endoscopy;  Laterality: N/A;  covid test 3/9, labs prior, prep instr mailed -ml    EXCISION OF ARTERIOVENOUS FISTULA Left 9/27/2023    Procedure: EXCISION, AV FISTULA;  Surgeon: FARIDEH Muñoz III, MD;  Location: [facility] OR [address] [place] [address];  Service: Vascular;  Laterality: Left;  LUE AV graft excision    INSERTION OF BIVENTRICULAR IMPLANTABLE CARDIOVERTER-DEFIBRILLATOR (ICD)  04/2021    INSERTION OF TUNNELED CENTRAL VENOUS CATHETER (CVC) WITH SUBCUTANEOUS PORT N/A 1/25/2023    Procedure: INSERTION, DUAL LUMEN CATHETER WITH PORT, WITH IMAGING GUIDANCE;  Surgeon: FARIDEH Muñoz III, MD;  [facility];  Service: Peripheral Vascular;  Laterality: N/A;  possinble permcath placment    NEPHRECTOMY-LAPAROSCOPIC Left 01/12/2016    PERCUTANEOUS TRANSLUMINAL ANGIOPLASTY OF ARTERIOVENOUS FISTULA Left 4/19/2023    Procedure: PTA, AV FISTULA;  Surgeon: FARIDEH Muñoz III, MD;  Location: [facility] CATH LAB;  Service: Peripheral Vascular;  Laterality: Left;    PERITONEAL CATHETER INSERTION      Permacath insertion  01/12/2017    PLACEMENT OF ARTERIOVENOUS GRAFT   12/28/2022    Procedure: INSERTION, GRAFT, ARTERIOVENOUS;  Surgeon: FARIDEH Muñoz III, MD;  Location: [facility] OR 2ND FLR;  Service: Peripheral Vascular;;    REMOVAL, GRAFT, ARTERIOVENOUS, UPPER EXTREMITY Left 1/25/2023    Procedure: REMOVAL, GRAFT, ARTERIOVENOUS, UPPER EXTREMITY;  Surgeon: FARIDEH Muñoz III, MD;  Location: [facility] OR 2ND FLR;  Service: Peripheral Vascular;  Laterality: Left;    REVISION OF ARTERIOVENOUS FISTULA Left 5/1/2023    Procedure: REVISION, AV FISTULA;  Surgeon: FARIDEH Muñoz III, MD;  Location: [facility] OR 2ND FLR;  Service: Peripheral Vascular;  Laterality: Left;  LUE AVG revision    REVISION OF PROCEDURE INVOLVING ARTERIOVENOUS GRAFT Left 12/28/2022    Procedure: REVISION, PROCEDURE INVOLVING ARTERIOVENOUS GRAFT;  Surgeon: FARIDEH Muñoz III, MD;  Location: [facility] OR 2ND FLR;  Service: Peripheral Vascular;  Laterality: Left;    REVISION OF PROCEDURE INVOLVING ARTERIOVENOUS GRAFT Left 7/21/2023    Procedure: LEFT ARM ARTERIOVENOUS GRAFT EXCISION  AND LIGATION;  Surgeon: Obi Werner MD;  Location: [facility] OR;  Service: Vascular;  Laterality: Left;  Left AVG excision and revision with interposition    SALPINGOOPHORECTOMY Right 2016    KJB---DAVINCI    TONSILLECTOMY      TUBAL LIGATION           Current Outpatient Medications:     acetaminophen (TYLENOL) 500 MG tablet, Take 500 mg by mouth every 6 (six) hours as needed for Pain., Disp: , Rfl:     acetaminophen (TYLENOL) 500 MG tablet, Take 1 tablet (500 mg total) by mouth every 4 (four) hours as needed for Pain or Temperature greater than (100.5 or greater)., Disp: 30 tablet, Rfl: 0    albuterol-ipratropium (DUO-NEB) 2.5 mg-0.5 mg/3 mL nebulizer solution, Take by nebulization., Disp: , Rfl:     apixaban (ELIQUIS) 2.5 mg Tab, Take 1 tablet (2.5 mg total) by mouth 2 (two) times daily., Disp: 60 tablet, Rfl: 11    aspirin (ECOTRIN) 81 MG EC tablet, Take 1 tablet (81 mg total) by mouth once daily., Disp: 90 tablet, Rfl: 3    atorvastatin  (LIPITOR) 40 MG tablet, Take 1 tablet (40 mg total) by mouth once daily., Disp: 90 tablet, Rfl: 0    bisacodyL (DULCOLAX) 10 mg Supp, Place 10 mg rectally., Disp: , Rfl:     cabozantinib (CABOMETYX) 60 mg Tab, TAKE ONE TABLET BY MOUTH ONCE DAILY AT THE SAME TIME ON AN EMPTY STOMACH AT LEAST 1 HOUR BEFORE OR 2 HOURS AFTER EATING. AVOID GRAPEFRUIT PRODUCTS, Disp: 30 tablet, Rfl: 4    cyclobenzaprine (FLEXERIL) 5 MG tablet, TAKE 1 TABLET BY MOUTH DAILY AS NEEDED FOR MUSCLE SPASMS., Disp: 30 tablet, Rfl: 1    hyoscyamine (LEVSIN/SL) 0.125 mg Subl, Place under the tongue., Disp: , Rfl:     lanthanum (FOSRENOL) 1000 MG chewable tablet, Take 1 tablet by mouth., Disp: , Rfl:     levothyroxine (SYNTHROID) 75 MCG tablet, TAKE 1 TABLET BY MOUTH EVERY DAY, Disp: 90 tablet, Rfl: 3    lidocaine-prilocaine (EMLA) cream, APPLY ATLEAST 30 MINUTES BEFORE TREATMENT 3 TIMES A WEEK, Disp: 30 g, Rfl: 11    LORAZEPAM INTENSOL 2 mg/mL Conc, Take by mouth., Disp: , Rfl:     metoprolol succinate (TOPROL-XL) 25 MG 24 hr tablet, Take 1 tablet (25 mg total) by mouth once daily., Disp: 30 tablet, Rfl: 11    mirtazapine (REMERON) 7.5 MG Tab, Take 1 tablet (7.5 mg total) by mouth every evening., Disp: 30 tablet, Rfl: 11    mv,Ca,min-folic acid-vit K1 (ONE-A-DAY WOMEN'S 50 PLUS) 400-20 mcg Tab, Take 1 tablet by mouth once daily., Disp: , Rfl:     naloxone (NARCAN) 1 mg/mL injection, 2 mg (1 mg per nostril) by Nasal route as needed for opioid overdose; may repeat in 3 to 5 minutes if not effective. Call 911, Disp: 2 mL, Rfl: 11    nystatin (MYCOSTATIN) cream, SMARTSIG:sparingly Topical 2-4 Times Daily PRN, Disp: , Rfl:     ondansetron (ZOFRAN-ODT) 4 MG TbDL, Take by mouth., Disp: , Rfl:     oxyCODONE (ROXICODONE) 5 MG immediate release tablet, Take 1 tablet (5 mg total) by mouth every 6 (six) hours as needed for Pain., Disp: 11 tablet, Rfl: 0    pantoprazole (PROTONIX) 40 MG tablet, Take 1 tablet (40 mg total) by mouth 2 (two) times daily., Disp: 60  "tablet, Rfl: 11    traMADoL (ULTRAM) 50 mg tablet, Take 1 tablet (50 mg total) by mouth every 12 (twelve) hours as needed for Pain., Disp: 30 each, Rfl: 1  No current facility-administered medications for this visit.    Facility-Administered Medications Ordered in Other Visits:     0.9%  NaCl infusion, , Intravenous, Continuous, Soni Bhatti MD, New Bag at 07/21/23 1116    Review of patient's allergies indicates:   Allergen Reactions    Carvedilol Other (See Comments)     Nausea/vomiting    Allopurinol      Other reaction(s): abnormal transaminases       Family History   Problem Relation Age of Onset    Hypertension Mother     Diabetes Father     Kidney disease Father     No Known Problems Sister     Heart disease Sister     No Known Problems Sister     No Known Problems Brother     No Known Problems Brother     Cataracts Maternal Aunt     No Known Problems Maternal Uncle     No Known Problems Paternal Aunt     No Known Problems Paternal Uncle     No Known Problems Maternal Grandmother     Diabetes Maternal Grandfather     No Known Problems Paternal Grandmother     No Known Problems Paternal Grandfather     No Known Problems Son     No Known Problems Son     No Known Problems Other     Breast cancer Neg Hx     Colon cancer Neg Hx     Cancer Neg Hx     Stroke Neg Hx     Amblyopia Neg Hx     Blindness Neg Hx     Glaucoma Neg Hx     Macular degeneration Neg Hx     Retinal detachment Neg Hx     Strabismus Neg Hx     Thyroid disease Neg Hx        Social History     Tobacco Use    Smoking status: Never     Passive exposure: Never    Smokeless tobacco: Never   Substance Use Topics    Alcohol use: No    Drug use: Yes     Types: Hydrocodone       PHYSICAL EXAM:   /88 (BP Location: Right arm, Patient Position: Sitting)   Pulse 103   Ht 5' 4" (1.626 m)   Wt 41.7 kg (91 lb 14.9 oz)   LMP 10/24/2016   BMI 15.78 kg/m²   Constitutional:  She appears very frail and deconditioned   Neurological: Normal speech  no focal " findings  CN II - XII grossly intact.    Psychiatric: Mood and affect appropriate and symmetric.   HEENT: Normocephalic / atraumatic  PERRLA  Midline trachea  No scars across the neck   Cardiac: Regular rate and rhythm.   Pulmonary: Normal pulmonary effort.   Abdomen: Soft, not distended.     Skin: Warm and well perfused.    Vascular:  Radial pulses are nonpalpable bilaterally.   Extremities/  Musculoskeletal: No edema.   Right arm inc well healed, L 2-3 toes with edema and dry ischemic changes       IMAGING:  No new imaging      IMPRESSION:   Status post excision, left AV graft remnant.    Patient's medical status continued to deteriorate and now off home hospice  AFib on Eliquis  Severe systolic dysfunction EF 20%  Metastatic renal cell to lung    PLAN  -NUVIA / Tps  -LLE arterial US to eval flow s/p L AT/PT PTA 7/2023  -Wound care referral  -F/u 3 weeks with studies after Onc prognosis / plan appt in 2 weeks    I spent 11 minutes evaluating this patient and greater than 50% of the time was spent counseling, coordinator care and discussing the plan of care.  All questions were answered and patient stated understanding with agreement with the above treatment plan.      Corby Casey M.D., R.P.V.I. [facility]

## 2024-02-06 NOTE — ASSESSMENT & PLAN NOTE
10/18/2023  - interval chart reviewed in detail; discussed pt/plan to transition to home hospice with hospice provider and MDT   - followed up with pt and  at bedside; Otis confirms plan to transition home with [facility] today if possible; awaiting DME delivery and de-activation of AICD  - reviewed portions of pt's previous outpatient care that would no longer be part of pt's plan of care so these do not come as a shock during hospice admission process   -  seems to have good insight into overall plan of care and philosophy of care for pt on hospice; he is hopeful she will be able to continue HD as long as she tolerates is but understands that may become an issue in the future given overall status/decline   - discussed his concerns on what to do if certain situations arise; encourage trust building with hospice team and their 24/7 availability; did share these concerns with hospice provider and asked for thorough explanations and encouragement on intake, as this is all very new to family and their GOC have progressed significantly this admission   - emotional support provided to family; allowed time for questions/concerns, all addressed   - updated CM/SW and hospice provider    Pt placed on tele (TTX: 1001). Tele called. Rhythm verified.

## 2024-02-07 ENCOUNTER — HOSPITAL ENCOUNTER (OUTPATIENT)
Dept: CARDIOLOGY | Facility: HOSPITAL | Age: 59
Discharge: HOME | End: 2024-02-07
Attending: SURGERY
Payer: MEDICARE

## 2024-02-07 DIAGNOSIS — L97.529 ISCHEMIC TOE ULCER, LEFT, WITH UNSPECIFIED SEVERITY: ICD-10-CM

## 2024-02-07 LAB
LEFT ABI: 1.95
LEFT ANT TIBIAL SYS PSV: 54 CM/S
LEFT CFA PSV: 35 CM/S
LEFT DORSALIS PEDIS: 255 MMHG
LEFT EXTERNAL ILIAC PSV: 46 CM/S
LEFT POPLITEAL PSV: 40 CM/S
LEFT POSTERIOR TIBIAL: 255 MMHG
LEFT PROFUNDA SYS PSV: 24 CM/S
LEFT SUPER FEMORAL DIST SYS PSV: 33 CM/S
LEFT SUPER FEMORAL MID SYS PSV: 48 CM/S
LEFT SUPER FEMORAL OSTIAL SYS PSV: 43 CM/S
LEFT SUPER FEMORAL PROX SYS PSV: 77 CM/S
LEFT TIB/PER TRUNK SYS PSV: 34 CM/S
RIGHT ABI: 1.95
RIGHT ARM BP: 131 MMHG
RIGHT DORSALIS PEDIS: 255 MMHG
RIGHT POSTERIOR TIBIAL: 255 MMHG

## 2024-02-07 PROCEDURE — 93926 LOWER EXTREMITY STUDY: CPT | Mod: 26,GW,LT, | Performed by: SURGERY

## 2024-02-07 PROCEDURE — 93922 UPR/L XTREMITY ART 2 LEVELS: CPT

## 2024-02-07 PROCEDURE — 93922 UPR/L XTREMITY ART 2 LEVELS: CPT | Mod: 26,GW,, | Performed by: SURGERY

## 2024-02-07 PROCEDURE — 93926 LOWER EXTREMITY STUDY: CPT | Mod: LT

## 2024-02-08 ENCOUNTER — EXTERNAL HOME HEALTH (OUTPATIENT)
Dept: HOME HEALTH SERVICES | Facility: HOSPITAL | Age: 59
End: 2024-02-08
Payer: MEDICARE

## 2024-02-09 DIAGNOSIS — S52.501A CLOSED FRACTURE OF DISTAL ENDS OF RIGHT RADIUS AND ULNA, INITIAL ENCOUNTER: Primary | ICD-10-CM

## 2024-02-09 DIAGNOSIS — S52.601A CLOSED FRACTURE OF DISTAL ENDS OF RIGHT RADIUS AND ULNA, INITIAL ENCOUNTER: Primary | ICD-10-CM

## 2024-02-12 NOTE — PROCEDURES
VIDEO ELECTROENCEPHALOGRAM  REPORT    DATE OF SERVICE:1/16/24    METHODOLOGY   Electroencephalographic (EEG) recording is with electrodes placed according to the International 10-20 placement system.  Thirty two (32) channels of digital signal (sampling rate of 512/sec) including T1 and T2 was simultaneously recorded from the scalp and may include  EKG, EMG, and/or eye monitors.  Recording band pass was 0.1 to 512 hz.  Digital video recording of the patient is simultaneously recorded with the EEG.  The patient is instructed report clinical symptoms which may occur during the recording session.  EEG and video recording is stored and archived in digital format.  Activation procedures which include photic stimulation, hyperventilation and instructing patients to perform simple task are done in selected patients.   The EEG is displayed on a monitor screen and can be reviewed using different montages.  Computer assisted analysis is employed to detect spike and electrographic seizure activity.   The entire record is submitted for computer analysis.  The entire recording is visually reviewed and the times identified by computer analysis as being spikes or seizures are reviewed again.  Compresses spectral analysis (CSA) is also performed on the activity recorded from each individual channel.  This is displayed as a power display of frequencies from 0 to 30 Hz over time.   The CSA is reviewed looking for asymmetries in power between homologous areas of the scalp and then compared with the original EEG recording.     DocDoc software was also utilized in the review of this study.  This software suite analyzes the EEG recording in multiple domains.  Coherence and rhythmicity is computed to identify EEG sections which may contain organized seizures.  Each channel undergoes analysis to detect presence of spike and sharp waves which have special and morphological characteristic of epileptic activity.  The routine EEG recording is  converted from spacial into frequency domain.  This is then displayed comparing homologous areas to identify areas of significant asymmetry.  Algorithm to identify non-cortically generated artifact is used to separate eye movement, EMG and other artifact from the EEG.        State of Consciousness:   Awake and drowsy    Background:   The background is well organized, symmetric and continuous.  There is a normal anterior to posterior gradient consisting of 5-10 mcV amplitude beta activity in the frontal region and well defined alpha activity in the posterior region.   There is a well developed 30-70 uV, 7-8 Hz posterior dominant rhythm that is symmetric and reactive to eye opening and closure  appreciated at maximum alertness.  Intermittent theta range slowing is noted.        Sleep:   The patient reaches stage 1 sleep with attenuation of the posterior dominant rhythm, bilateral theta activity, and vertex waves noted. The patient does not enter stage 2 sleep    Epileptiform Abnormalities  None    EKG:   Regular rate and rhythm on single lead EKG    Activating procedures:   Not done    Events:   None    Impression:  This awake and drowsy EEG is consistent with a mild to moderate diffuse encephalopathy.  There are numerous possible etiologies including metabolic derangements, drug intoxication, systemic infections, or a primary neuronal disorder.  There is no evidence of seizure activity or focal abnormalities in the record.     Chantal Rodriguez MD  [facility]   Department of Neurology/Epilepsy

## 2024-02-16 ENCOUNTER — OFFICE VISIT (OUTPATIENT)
Dept: FAMILY MEDICINE | Facility: CLINIC | Age: 59
End: 2024-02-16
Payer: MEDICARE

## 2024-02-16 ENCOUNTER — OFFICE VISIT (OUTPATIENT)
Dept: PODIATRY | Facility: CLINIC | Age: 59
End: 2024-02-16
Payer: MEDICARE

## 2024-02-16 ENCOUNTER — TELEPHONE (OUTPATIENT)
Dept: HEMATOLOGY/ONCOLOGY | Facility: CLINIC | Age: 59
End: 2024-02-16
Payer: MEDICARE

## 2024-02-16 VITALS
BODY MASS INDEX: 15.88 KG/M2 | HEIGHT: 64 IN | WEIGHT: 93 LBS | SYSTOLIC BLOOD PRESSURE: 126 MMHG | TEMPERATURE: 98 F | OXYGEN SATURATION: 96 % | DIASTOLIC BLOOD PRESSURE: 82 MMHG | HEART RATE: 94 BPM

## 2024-02-16 VITALS
DIASTOLIC BLOOD PRESSURE: 86 MMHG | WEIGHT: 93 LBS | BODY MASS INDEX: 15.88 KG/M2 | HEART RATE: 89 BPM | HEIGHT: 64 IN | SYSTOLIC BLOOD PRESSURE: 129 MMHG

## 2024-02-16 DIAGNOSIS — L97.529 ISCHEMIC TOE ULCER, LEFT, WITH UNSPECIFIED SEVERITY: Primary | ICD-10-CM

## 2024-02-16 DIAGNOSIS — S52.691D OTHER CLOSED FRACTURE OF DISTAL END OF RIGHT ULNA WITH ROUTINE HEALING, SUBSEQUENT ENCOUNTER: Primary | ICD-10-CM

## 2024-02-16 DIAGNOSIS — S52.591D OTHER CLOSED FRACTURE OF DISTAL END OF RIGHT RADIUS WITH ROUTINE HEALING, SUBSEQUENT ENCOUNTER: ICD-10-CM

## 2024-02-16 DIAGNOSIS — E43 SEVERE PROTEIN-CALORIE MALNUTRITION: ICD-10-CM

## 2024-02-16 DIAGNOSIS — L97.429 HEEL ULCER, LEFT, WITH UNSPECIFIED SEVERITY: ICD-10-CM

## 2024-02-16 DIAGNOSIS — Z86.73 HISTORY OF CVA (CEREBROVASCULAR ACCIDENT): ICD-10-CM

## 2024-02-16 DIAGNOSIS — G47.00 INSOMNIA, UNSPECIFIED TYPE: ICD-10-CM

## 2024-02-16 DIAGNOSIS — I73.9 PAD (PERIPHERAL ARTERY DISEASE): ICD-10-CM

## 2024-02-16 PROCEDURE — 99213 OFFICE O/P EST LOW 20 MIN: CPT | Mod: S$PBB,,, | Performed by: PODIATRIST

## 2024-02-16 PROCEDURE — 99215 OFFICE O/P EST HI 40 MIN: CPT | Mod: PBBFAC,25,PO | Performed by: FAMILY MEDICINE

## 2024-02-16 PROCEDURE — 99999 PR PBB SHADOW E&M-EST. PATIENT-LVL IV: CPT | Mod: PBBFAC,,, | Performed by: PODIATRIST

## 2024-02-16 PROCEDURE — 99999 PR PBB SHADOW E&M-EST. PATIENT-LVL V: CPT | Mod: PBBFAC,,, | Performed by: FAMILY MEDICINE

## 2024-02-16 PROCEDURE — 99214 OFFICE O/P EST MOD 30 MIN: CPT | Mod: PBBFAC,27,PO | Performed by: PODIATRIST

## 2024-02-16 PROCEDURE — 99214 OFFICE O/P EST MOD 30 MIN: CPT | Mod: S$PBB,GW,, | Performed by: FAMILY MEDICINE

## 2024-02-16 NOTE — PROGRESS NOTES
Subjective:      Patient ID: Eva Bloom is a 58 y.o. female.    Chief Complaint: Foot Ulcer    Ulcer/gangrene left 3rd toe (1st toe left healed, pip area 3rd toe healed).  Gradual onset, improving slowly over past 8 weeks, aggravated by increased weight bearing, shoe gear, pressure.  Betadine swabs, open toe shoes, angioplasty all helping.. Denies trauma, surgery.    Increased pain back of left heel.  Gradual onset, worsening over past several dayss, aggravated by increased weight bearing, shoe gear, pressure.  Using heel offload boot at rest.  Swabbing left heel daily with betadine.    7/27/2023 angioplasty successfuly        Review of Systems   Constitutional: Negative for chills, diaphoresis, fever, malaise/fatigue and night sweats.   Cardiovascular:  Positive for leg swelling. Negative for claudication, cyanosis and syncope.   Skin:  Positive for poor wound healing. Negative for color change, dry skin, nail changes, rash, suspicious lesions and unusual hair distribution.   Musculoskeletal:  Negative for falls, joint pain, joint swelling, muscle cramps, muscle weakness and stiffness.   Gastrointestinal:  Negative for constipation, diarrhea, nausea and vomiting.   Neurological:  Positive for sensory change. Negative for brief paralysis, disturbances in coordination, focal weakness, numbness, paresthesias and tremors.           Objective:      Physical Exam  Constitutional:       General: She is not in acute distress.     Appearance: She is well-developed. She is not diaphoretic.   Cardiovascular:      Pulses:           Dorsalis pedis pulses are 1+ on the right side and 1+ on the left side.        Posterior tibial pulses are 1+ on the right side and 1+ on the left side.      Comments: Capillary refill 3 seconds all toes/distal feet, all toes/both feet warm to touch.      Negative lymphadenopathy bilateral popliteal fossa and tarsal tunnel.      <2+ pitting lower extremity edema bilateral.    Musculoskeletal:       Right ankle: No swelling, deformity, ecchymosis or lacerations. Normal range of motion. Normal pulse.      Right Achilles Tendon: Normal. No defects. Manning's test negative.      Comments: Relates left leg/thigh weakness - not tested in office today.   Lymphadenopathy:      Lower Body: No right inguinal adenopathy. No left inguinal adenopathy.      Comments: Negative lymphadenopathy bilateral popliteal fossa and tarsal tunnel.    Negative lymphangitic streaking bilateral feet/ankles/legs.   Skin:     General: Skin is warm and dry.      Capillary Refill: Capillary refill takes 2 to 3 seconds.      Coloration: Skin is not pale.      Findings: No abrasion, bruising, burn, ecchymosis, erythema, laceration, lesion or rash.      Nails: There is no clubbing.      Comments:   Focal area dorsal medial left 3rd  toe dipj dry indurated gangrene demarcating  without open skin ulceration, drainage, pus, tracking, fluctuance, malodor, or cardinal signs infection.    Plantar posterior dry black escharotic area left heel  without open skin, drainage, pus, tracking, fluctuance, malodor, or cardinal signs infection.       New ulcer posterior inferior left heel black eschar  without ulceration, drainage, pus, tracking, fluctuance, malodor, or cardinal signs infection.        Otherwise, Skin thin, shiny, atrophic, with decreased density and distribution of pedal hair bilateral, but without hyperpigmentation, daniele discoloration,  ulcers, masses, nodules or cords palpated bilateral feet and legs.    Neurological:      Mental Status: She is alert and oriented to person, place, and time.      Sensory: No sensory deficit.      Motor: No tremor, atrophy or abnormal muscle tone.      Gait: Gait normal.      Comments: Negative tinel sign to percussion sural, superficial peroneal, deep peroneal, saphenous, and posterior tibial nerves right and left ankles and feet.     Psychiatric:         Behavior: Behavior is cooperative.              Assessment:       Encounter Diagnoses   Name Primary?    Ischemic toe ulcer, left, with unspecified severity Yes    PAD (peripheral artery disease)     Heel ulcer, left, with unspecified severity              Plan:       Eva was seen today for foot ulcer.    Diagnoses and all orders for this visit:    Ischemic toe ulcer, left, with unspecified severity    PAD (peripheral artery disease)    Heel ulcer, left, with unspecified severity          I counseled the patient on her conditions, their implications and medical management.    Continue betadine swabs daily left 3rd toe and heel.  Cover with band aid if desired.  Continue open toe shoe (surgical shoes bilateral today)    Heel offload boots all times stationary.     Re consult vascualr surgery - any possible improvement in circulation- not presently.  Heel ulcer is slowly worsening.  Note to Dr. Muñoz acknowledged, confirms no further vascularization options.    Continue HH.    1 month, assessment/evaluation, sooner prn.          No follow-ups on file.

## 2024-02-16 NOTE — PROGRESS NOTES
"Assessment & Plan:    Other closed fracture of distal end of right ulna with routine healing, subsequent encounter  Other closed fracture of distal end of right radius with routine healing, subsequent encounter  Continue management per Orthopedics recommendations    Severe protein-calorie malnutrition  -     PREALBUMIN; Future; Expected date: 02/16/2024    Insomnia, unspecified type  Improved. Weight is stable. Patient's  would like to continue Remeron at current dose.     History of CVA (cerebrovascular accident)  -     Lipid Panel; Future; Expected date: 02/16/2024        Follow-up: Follow up in about 3 months (around 5/16/2024).  ______________________________________________________________________    Chief Complaint  Chief Complaint   Patient presents with    Wrist Injury     Follow up       HPI  Eva Bloom is a 58 y.o. female with medical diagnoses as listed in the medical history and problem list that presents to the office with her  to follow up on an ER visit on 1/16 for nondisplaced fractures involving the distal metaphysis of the right radius and ulna after a mechanical fall. Please see ER encounter for full details. Patient's right arm was placed in a short arm splint and sling and she was referred to Orthopedics. Patient saw Ortho on 1/31 who is managing the fractures conservatively in a wrist brace. Instructed to return to clinic in 2 weeks for repeat x-rays. Tylenol is helping her pain.    Patient was initially scheduled for a 1 month follow up on insomnia, malnutrition, and hypotension. She was restarted on low dose Remeron for insomnia and appetite stimulation and her Toprol XL was lowered to 25 mg to reduce incidence of hypotension on dialysis days. Her  states that her sleep and appetite are "so-so" and would like to continue the Remeron at her current dose. Weight is stable at 92-92 lbs. She reports occasional episodes of hypotension during dialysis but not as " frequently as before.     Health Maintenance         Date Due Completion Date    COVID-19 Vaccine (7 - 2023-24 season) 09/01/2023 11/11/2021    Lipid Panel 10/22/2023 10/22/2022    Hemoglobin A1c 08/25/2024 8/25/2023    Mammogram 12/08/2024 12/8/2023    TETANUS VACCINE 09/23/2025 9/23/2015    Colorectal Cancer Screening 03/12/2028 3/12/2021    Pneumococcal Vaccines (Age 0-64) (4 of 4 - PPSV23 or PCV20) 10/01/2029 11/9/2016              PAST MEDICAL HISTORY:  Past Medical History:   Diagnosis Date    Anemia     Anticoagulant long-term use     Atrial fibrillation     Bronchitis 03/01/2017    Cancer 2016    kidney cancer    CHF (congestive heart failure), NYHA class II, chronic, systolic     CMV (cytomegalovirus) antibody positive     CVA (cerebral vascular accident) 1/3/2024    Encounter for blood transfusion     ESRD (end stage renal disease)     Essential hypertension 09/23/2015    H/O herpes simplex type 2 infection     Herpes simplex type 1 antibody positive     History of kidney cancer     s/p left nephrectomy 1/2016    Hyperparathyroidism, unspecified     Hyperuricemia without signs of inflammatory arthritis and tophaceous disease     Kidney stones     LGSIL (low grade squamous intraepithelial dysplasia)     Myocardiopathy 07/21/2017    Prediabetes     Proteinuria     Renal disorder     Thyroid nodule     Urate nephropathy        PAST SURGICAL HISTORY:  Past Surgical History:   Procedure Laterality Date    BREAST CYST EXCISION      COLONOSCOPY N/A 11/12/2015    COLONOSCOPY N/A 3/12/2021    Procedure: COLONOSCOPY;  Surgeon: Brendon Lanier MD;  Location: [facility];  Service: Endoscopy;  Laterality: N/A;  covid test 3/9, labs prior, prep instr mailed -ml    EXCISION OF ARTERIOVENOUS FISTULA Left 9/27/2023    Procedure: EXCISION, AV FISTULA;  Surgeon: FARIDEH Muñoz III, MD;  Location: [facility] OR [address] [place] [address];  Service: Vascular;  Laterality: Left;  LUE AV graft excision    INSERTION OF BIVENTRICULAR IMPLANTABLE  CARDIOVERTER-DEFIBRILLATOR (ICD)  04/2021    INSERTION OF TUNNELED CENTRAL VENOUS CATHETER (CVC) WITH SUBCUTANEOUS PORT N/A 1/25/2023    Procedure: INSERTION, DUAL LUMEN CATHETER WITH PORT, WITH IMAGING GUIDANCE;  Surgeon: FARIDEH Muñoz III, MD;  Location: [facility] OR [address] [place] [address];  Service: Peripheral Vascular;  Laterality: N/A;  possinble permcath placment    NEPHRECTOMY-LAPAROSCOPIC Left 01/12/2016    PERCUTANEOUS TRANSLUMINAL ANGIOPLASTY OF ARTERIOVENOUS FISTULA Left 4/19/2023    Procedure: PTA, AV FISTULA;  Surgeon: FARIDEH Muñoz III, MD;  Location: [facility] CATH LAB;  Service: Peripheral Vascular;  Laterality: Left;    PERITONEAL CATHETER INSERTION      Permacath insertion  01/12/2017    PLACEMENT OF ARTERIOVENOUS GRAFT  12/28/2022    Procedure: INSERTION, GRAFT, ARTERIOVENOUS;  Surgeon: FARIDEH Muñoz III, MD;  [facility];  Service: Peripheral Vascular;;    REMOVAL, GRAFT, ARTERIOVENOUS, UPPER EXTREMITY Left 1/25/2023    Procedure: REMOVAL, GRAFT, ARTERIOVENOUS, UPPER EXTREMITY;  Surgeon: FARIDEH Muñoz III, MD;  [facility];  Service: Peripheral Vascular;  Laterality: Left;    REVISION OF ARTERIOVENOUS FISTULA Left 5/1/2023    Procedure: REVISION, AV FISTULA;  Surgeon: FARIDEH Muñoz III, MD;  [facility];  Service: Peripheral Vascular;  Laterality: Left;  LUE AVG revision    REVISION OF PROCEDURE INVOLVING ARTERIOVENOUS GRAFT Left 12/28/2022    Procedure: REVISION, PROCEDURE INVOLVING ARTERIOVENOUS GRAFT;  Surgeon: FARIDEH Muñoz III, MD;  [facility];  Service: Peripheral Vascular;  Laterality: Left;    REVISION OF PROCEDURE INVOLVING ARTERIOVENOUS GRAFT Left 7/21/2023    Procedure: LEFT ARM ARTERIOVENOUS GRAFT EXCISION  AND LIGATION;  Surgeon: Obi Werner MD;  Location: [facility] OR;  Service: Vascular;  Laterality: Left;  Left AVG excision and revision with interposition    SALPINGOOPHORECTOMY Right 2016    KJB---DAVINCI    TONSILLECTOMY       TUBAL LIGATION         SOCIAL HISTORY:  Social History     Socioeconomic History    Marital status:     Number of children: 2   Occupational History    Occupation: lucero     Employer: [id number]   Tobacco Use    Smoking status: Never     Passive exposure: Never    Smokeless tobacco: Never   Substance and Sexual Activity    Alcohol use: No    Drug use: Yes     Types: Hydrocodone    Sexual activity: Not Currently     Social Determinants of Health     Financial Resource Strain: Low Risk  (9/28/2023)    Overall Financial Resource Strain (CARDIA)     Difficulty of Paying Living Expenses: Not hard at all   Food Insecurity: No Food Insecurity (9/28/2023)    Hunger Vital Sign     Worried About Running Out of Food in the Last Year: Never true     Ran Out of Food in the Last Year: Never true   Transportation Needs: No Transportation Needs (9/28/2023)    PRAPARE - Transportation     Lack of Transportation (Medical): No     Lack of Transportation (Non-Medical): No   Physical Activity: Inactive (9/28/2023)    Exercise Vital Sign     Days of Exercise per Week: 0 days     Minutes of Exercise per Session: 0 min   Stress: No Stress Concern Present (9/28/2023)    [employer] [place] [employer] - Occupational Stress Questionnaire     Feeling of Stress : Not at all   Social Connections: Moderately Integrated (9/28/2023)    Social Connection and Isolation Panel [NHANES]     Frequency of Communication with Friends and Family: More than three times a week     Frequency of Social Gatherings with Friends and Family: More than three times a week     Attends Druze Services: More than 4 times per year     Active Member of Clubs or Organizations: No     Attends Club or Organization Meetings: Never     Marital Status:    Housing Stability: Low Risk  (9/28/2023)    Housing Stability Vital Sign     Unable to Pay for Housing in the Last Year: No     Number of Places Lived in the Last Year: 1     Unstable  Housing in the Last Year: No       FAMILY HISTORY:  Family History   Problem Relation Age of Onset    Hypertension Mother     Diabetes Father     Kidney disease Father     No Known Problems Sister     Heart disease Sister     No Known Problems Sister     No Known Problems Brother     No Known Problems Brother     Cataracts Maternal Aunt     No Known Problems Maternal Uncle     No Known Problems Paternal Aunt     No Known Problems Paternal Uncle     No Known Problems Maternal Grandmother     Diabetes Maternal Grandfather     No Known Problems Paternal Grandmother     No Known Problems Paternal Grandfather     No Known Problems Son     No Known Problems Son     No Known Problems Other     Breast cancer Neg Hx     Colon cancer Neg Hx     Cancer Neg Hx     Stroke Neg Hx     Amblyopia Neg Hx     Blindness Neg Hx     Glaucoma Neg Hx     Macular degeneration Neg Hx     Retinal detachment Neg Hx     Strabismus Neg Hx     Thyroid disease Neg Hx        ALLERGIES AND MEDICATIONS: updated and reviewed.  Review of patient's allergies indicates:   Allergen Reactions    Carvedilol Other (See Comments)     Nausea/vomiting    Allopurinol      Other reaction(s): abnormal transaminases     Current Outpatient Medications   Medication Sig Dispense Refill    acetaminophen (TYLENOL) 500 MG tablet Take 500 mg by mouth every 6 (six) hours as needed for Pain.      acetaminophen (TYLENOL) 500 MG tablet Take 1 tablet (500 mg total) by mouth every 4 (four) hours as needed for Pain or Temperature greater than (100.5 or greater). 30 tablet 0    albuterol-ipratropium (DUO-NEB) 2.5 mg-0.5 mg/3 mL nebulizer solution Take by nebulization.      apixaban (ELIQUIS) 2.5 mg Tab Take 1 tablet (2.5 mg total) by mouth 2 (two) times daily. 60 tablet 11    aspirin (ECOTRIN) 81 MG EC tablet Take 1 tablet (81 mg total) by mouth once daily. 90 tablet 3    atorvastatin (LIPITOR) 40 MG tablet Take 1 tablet (40 mg total) by mouth once daily. 90 tablet 0    bisacodyL  (DULCOLAX) 10 mg Supp Place 10 mg rectally.      cabozantinib (CABOMETYX) 60 mg Tab TAKE ONE TABLET BY MOUTH ONCE DAILY AT THE SAME TIME ON AN EMPTY STOMACH AT LEAST 1 HOUR BEFORE OR 2 HOURS AFTER EATING. AVOID GRAPEFRUIT PRODUCTS 30 tablet 4    cyclobenzaprine (FLEXERIL) 5 MG tablet TAKE 1 TABLET BY MOUTH DAILY AS NEEDED FOR MUSCLE SPASMS. 30 tablet 1    hyoscyamine (LEVSIN/SL) 0.125 mg Subl Place under the tongue.      lanthanum (FOSRENOL) 1000 MG chewable tablet Take 1 tablet by mouth.      levothyroxine (SYNTHROID) 75 MCG tablet TAKE 1 TABLET BY MOUTH EVERY DAY 90 tablet 3    lidocaine-prilocaine (EMLA) cream APPLY ATLEAST 30 MINUTES BEFORE TREATMENT 3 TIMES A WEEK 30 g 11    LORAZEPAM INTENSOL 2 mg/mL Conc Take by mouth.      metoprolol succinate (TOPROL-XL) 25 MG 24 hr tablet Take 1 tablet (25 mg total) by mouth once daily. 30 tablet 11    mirtazapine (REMERON) 7.5 MG Tab Take 1 tablet (7.5 mg total) by mouth every evening. 30 tablet 11    mv,Ca,min-folic acid-vit K1 (ONE-A-DAY WOMEN'S 50 PLUS) 400-20 mcg Tab Take 1 tablet by mouth once daily.      naloxone (NARCAN) 1 mg/mL injection 2 mg (1 mg per nostril) by Nasal route as needed for opioid overdose; may repeat in 3 to 5 minutes if not effective. Call 911 2 mL 11    nystatin (MYCOSTATIN) cream SMARTSIG:sparingly Topical 2-4 Times Daily PRN      ondansetron (ZOFRAN-ODT) 4 MG TbDL Take by mouth.      oxyCODONE (ROXICODONE) 5 MG immediate release tablet Take 1 tablet (5 mg total) by mouth every 6 (six) hours as needed for Pain. 11 tablet 0    pantoprazole (PROTONIX) 40 MG tablet Take 1 tablet (40 mg total) by mouth 2 (two) times daily. 60 tablet 11    traMADoL (ULTRAM) 50 mg tablet Take 1 tablet (50 mg total) by mouth every 12 (twelve) hours as needed for Pain. 30 each 1     No current facility-administered medications for this visit.     Facility-Administered Medications Ordered in Other Visits   Medication Dose Route Frequency Provider Last Rate Last Admin     "0.9%  NaCl infusion   Intravenous Continuous Soni Bhatti MD   New Bag at 07/21/23 1116         ROS  Review of Systems   Constitutional:  Positive for appetite change. Negative for activity change and unexpected weight change.   Musculoskeletal:  Positive for arthralgias, gait problem and joint swelling.   Neurological:  Positive for weakness (in a wheelchair).   Psychiatric/Behavioral:  Positive for confusion and sleep disturbance.            Physical Exam  Vitals:    02/16/24 0739   BP: 126/82   Pulse: 94   Temp: 98.3 °F (36.8 °C)   TempSrc: Oral   SpO2: 96%   Weight: 42.2 kg (93 lb)   Height: 5' 4" (1.626 m)    Body mass index is 15.96 kg/m².  Weight: 42.2 kg (93 lb)   Height: 5' 4" (162.6 cm)   Physical Exam  Constitutional:       General: She is not in acute distress.  HENT:      Head: Normocephalic and atraumatic.   Musculoskeletal:      Comments: In a wheelchair. Right wrist brace in place.   Skin:     General: Skin is warm and dry.   Neurological:      Mental Status: She is alert. Mental status is at baseline.   Psychiatric:         Mood and Affect: Mood normal.             "

## 2024-02-19 ENCOUNTER — OFFICE VISIT (OUTPATIENT)
Dept: HEMATOLOGY/ONCOLOGY | Facility: CLINIC | Age: 59
End: 2024-02-19
Payer: MEDICARE

## 2024-02-19 VITALS
SYSTOLIC BLOOD PRESSURE: 121 MMHG | RESPIRATION RATE: 14 BRPM | BODY MASS INDEX: 16.05 KG/M2 | OXYGEN SATURATION: 100 % | HEIGHT: 64 IN | DIASTOLIC BLOOD PRESSURE: 82 MMHG | TEMPERATURE: 98 F | HEART RATE: 99 BPM | WEIGHT: 94 LBS

## 2024-02-19 DIAGNOSIS — C79.89 METASTATIC RENAL CELL CARCINOMA TO INTRA-ABDOMINAL SITE: Primary | ICD-10-CM

## 2024-02-19 DIAGNOSIS — C64.9 METASTATIC RENAL CELL CARCINOMA TO INTRA-ABDOMINAL SITE: Primary | ICD-10-CM

## 2024-02-19 PROCEDURE — 99215 OFFICE O/P EST HI 40 MIN: CPT | Mod: PBBFAC | Performed by: INTERNAL MEDICINE

## 2024-02-19 PROCEDURE — 99215 OFFICE O/P EST HI 40 MIN: CPT | Mod: S$PBB,GW,, | Performed by: INTERNAL MEDICINE

## 2024-02-19 PROCEDURE — 99999 PR PBB SHADOW E&M-EST. PATIENT-LVL V: CPT | Mod: PBBFAC,,, | Performed by: INTERNAL MEDICINE

## 2024-02-19 NOTE — PROGRESS NOTES
"  Subjective:       Patient ID: Eva Bloom is a 58 y.o. female.    Chief Complaint: RCC    HPI     58 y.o.female to clinic for follow up for metastatic RCC. Patient was previously on hospice but now not enrolled as she wants to continue dialysis. Around the new year, was hospitilized for a CVA and recently had a fall where she fractured her right wrist.  Pt has had decline in PS, now requiring assist with all ADLs.   reports intermittent appetite.Off cabozantinib.  Pt still is on ESRD with HD every TTS. Has HH coming out with PT services.    ECOG 3. She is accompanied with her . She presents in a wheelchair.      Oncologic History (From Chart and Patient):  Eva Bloom is a 58 y.o.female with ESRD on HD who was found to have two L renal masses. She underwent L lap nephrectomy on 1/12/16. 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 Margins were negative.  Shehealed from surgery well, but then developed a large ovarian mass.  This was removed by gyn along with multiple sites of metastatic disease in the pelvis.  Path was c/w recurrence of RCC.     11/20/16 Pelvic ultrasound reveals "Large heterogeneous cystic and solid mass which appears to arise from the right ovary with worrisome imaging features for malignancy.  Differential diagnosis includes malignant tumors such as serous or mucinous cystadenocarcinoma.  It is important to note that the patient is at risk for ovarian torsion due to the size of the mass.Multiple uterine fibroids are identified with the largest in the uterine fundus."    11/22/16 pathology reveals "FINAL PATHOLOGIC DIAGNOSIS-RIGHT OVARY AND FALLOPIAN TUBE, RIGHT SALPINGO-OOPHORECTOMY:  -Positive for malignancy, high grade carcinoma morphologically and immunohistochemically consistent with metastasis from the patient's known renal cell carcinoma"    Review " of Systems   Constitutional:  Positive for activity change, appetite change, fatigue and unexpected weight change. Negative for chills and fever.   HENT:  Negative for congestion, hearing loss, mouth sores, postnasal drip, sore throat, tinnitus and voice change.    Eyes:  Negative for pain and visual disturbance.   Respiratory:  Negative for cough, shortness of breath and wheezing.    Cardiovascular:  Negative for chest pain, palpitations and leg swelling.   Gastrointestinal:  Negative for abdominal pain, constipation, diarrhea, nausea and vomiting.   Endocrine: Negative for cold intolerance and heat intolerance.   Genitourinary:  Negative for difficulty urinating, dyspareunia, dysuria, frequency, menstrual problem, urgency, vaginal bleeding, vaginal discharge and vaginal pain.   Musculoskeletal:  Negative for arthralgias and myalgias.        LE ulcerations and pain    Skin:  Negative for color change, rash and wound.   Allergic/Immunologic: Negative for environmental allergies and food allergies.   Neurological:  Negative for weakness, numbness and headaches.   Hematological:  Negative for adenopathy. Does not bruise/bleed easily.   Psychiatric/Behavioral:  Negative for agitation, confusion, hallucinations and sleep disturbance. The patient is not nervous/anxious.    All other systems reviewed and are negative.      Allergies:  Review of patient's allergies indicates:   Allergen Reactions    Allopurinol      Other reaction(s): abnormal transaminases       Medications:  Current Outpatient Medications   Medication Sig Dispense Refill    acetaminophen (TYLENOL) 500 MG tablet Take 500 mg by mouth every 6 (six) hours as needed for Pain.      acetaminophen (TYLENOL) 500 MG tablet Take 1 tablet (500 mg total) by mouth every 4 (four) hours as needed for Pain or Temperature greater than (100.5 or greater). 30 tablet 0    albuterol-ipratropium (DUO-NEB) 2.5 mg-0.5 mg/3 mL nebulizer solution Take by nebulization.       apixaban (ELIQUIS) 2.5 mg Tab Take 1 tablet (2.5 mg total) by mouth 2 (two) times daily. 60 tablet 11    aspirin (ECOTRIN) 81 MG EC tablet Take 1 tablet (81 mg total) by mouth once daily. 90 tablet 3    atorvastatin (LIPITOR) 40 MG tablet Take 1 tablet (40 mg total) by mouth once daily. 90 tablet 0    bisacodyL (DULCOLAX) 10 mg Supp Place 10 mg rectally.      cabozantinib (CABOMETYX) 60 mg Tab TAKE ONE TABLET BY MOUTH ONCE DAILY AT THE SAME TIME ON AN EMPTY STOMACH AT LEAST 1 HOUR BEFORE OR 2 HOURS AFTER EATING. AVOID GRAPEFRUIT PRODUCTS 30 tablet 4    cyclobenzaprine (FLEXERIL) 5 MG tablet TAKE 1 TABLET BY MOUTH DAILY AS NEEDED FOR MUSCLE SPASMS. 30 tablet 1    epoetin david-epbx (RETACRIT INJ) Epoetin david - epbx (Retacrit)      hyoscyamine (LEVSIN/SL) 0.125 mg Subl Place under the tongue.      lanthanum (FOSRENOL) 1000 MG chewable tablet Take 1 tablet by mouth.      levothyroxine (SYNTHROID) 75 MCG tablet TAKE 1 TABLET BY MOUTH EVERY DAY 90 tablet 3    lidocaine-prilocaine (EMLA) cream APPLY ATLEAST 30 MINUTES BEFORE TREATMENT 3 TIMES A WEEK 30 g 11    LORAZEPAM INTENSOL 2 mg/mL Conc Take by mouth.      metoprolol succinate (TOPROL-XL) 25 MG 24 hr tablet Take 1 tablet (25 mg total) by mouth once daily. 30 tablet 11    mirtazapine (REMERON) 7.5 MG Tab Take 1 tablet (7.5 mg total) by mouth every evening. 30 tablet 11    mv,Ca,min-folic acid-vit K1 (ONE-A-DAY WOMEN'S 50 PLUS) 400-20 mcg Tab Take 1 tablet by mouth once daily.      naloxone (NARCAN) 1 mg/mL injection 2 mg (1 mg per nostril) by Nasal route as needed for opioid overdose; may repeat in 3 to 5 minutes if not effective. Call 911 2 mL 11    nystatin (MYCOSTATIN) cream SMARTSIG:sparingly Topical 2-4 Times Daily PRN      ondansetron (ZOFRAN-ODT) 4 MG TbDL Take by mouth.      oxyCODONE (ROXICODONE) 5 MG immediate release tablet Take 1 tablet (5 mg total) by mouth every 6 (six) hours as needed for Pain. 11 tablet 0    pantoprazole (PROTONIX) 40 MG tablet Take 1  tablet (40 mg total) by mouth 2 (two) times daily. 60 tablet 11    traMADoL (ULTRAM) 50 mg tablet Take 1 tablet (50 mg total) by mouth every 12 (twelve) hours as needed for Pain. 30 each 1     No current facility-administered medications for this visit.     Facility-Administered Medications Ordered in Other Visits   Medication Dose Route Frequency Provider Last Rate Last Admin    0.9%  NaCl infusion   Intravenous Continuous Soni Bhatti MD   New Bag at 07/21/23 1116       PMH:  Past Medical History:   Diagnosis Date    Anemia     Anticoagulant long-term use     Atrial fibrillation     Bronchitis 03/01/2017    Cancer 2016    kidney cancer    CHF (congestive heart failure), NYHA class II, chronic, systolic     CMV (cytomegalovirus) antibody positive     CVA (cerebral vascular accident) 1/3/2024    Encounter for blood transfusion     ESRD (end stage renal disease)     Essential hypertension 09/23/2015    H/O herpes simplex type 2 infection     Herpes simplex type 1 antibody positive     History of kidney cancer     s/p left nephrectomy 1/2016    Hyperparathyroidism, unspecified     Hyperuricemia without signs of inflammatory arthritis and tophaceous disease     Kidney stones     LGSIL (low grade squamous intraepithelial dysplasia)     Myocardiopathy 07/21/2017    Prediabetes     Proteinuria     Renal disorder     Thyroid nodule     Urate nephropathy        PSH:  Past Surgical History:   Procedure Laterality Date    BREAST CYST EXCISION      COLONOSCOPY N/A 11/12/2015    COLONOSCOPY N/A 03/12/2021    Procedure: COLONOSCOPY;  Surgeon: Brendon Lanier MD;  Location: [facility];  Service: Endoscopy;  Laterality: N/A;  covid test 3/9, labs prior, prep instr mailed -ml    EXCISION OF ARTERIOVENOUS FISTULA Left 09/27/2023    Procedure: EXCISION, AV FISTULA;  Surgeon: FARIDEH Muñoz III, MD;  Location: [facility] OR [address] [place] [address];  Service: Vascular;  Laterality: Left;  LUE AV graft excision    INSERTION OF BIVENTRICULAR  IMPLANTABLE CARDIOVERTER-DEFIBRILLATOR (ICD)  04/2021    INSERTION OF TUNNELED CENTRAL VENOUS CATHETER (CVC) WITH SUBCUTANEOUS PORT N/A 01/25/2023    Procedure: INSERTION, DUAL LUMEN CATHETER WITH PORT, WITH IMAGING GUIDANCE;  Surgeon: FARIDEH Muñoz III, MD;  Location: [facility] OR [address] [place] [address];  Service: Peripheral Vascular;  Laterality: N/A;  possinble permcath placment    NEPHRECTOMY-LAPAROSCOPIC Left 01/12/2016    PERCUTANEOUS TRANSLUMINAL ANGIOPLASTY OF ARTERIOVENOUS FISTULA Left 04/19/2023    Procedure: PTA, AV FISTULA;  Surgeon: FARIDEH Muñoz III, MD;  Location: [facility] CATH LAB;  Service: Peripheral Vascular;  Laterality: Left;    PERITONEAL CATHETER INSERTION      Permacath insertion  01/12/2017    PLACEMENT OF ARTERIOVENOUS GRAFT  12/28/2022    Procedure: INSERTION, GRAFT, ARTERIOVENOUS;  Surgeon: FARIDEH Muñoz III, MD;  [facility];  Service: Peripheral Vascular;;    REMOVAL, GRAFT, ARTERIOVENOUS, UPPER EXTREMITY Left 01/25/2023    Procedure: REMOVAL, GRAFT, ARTERIOVENOUS, UPPER EXTREMITY;  Surgeon: FARIDEH Muñoz III, MD;  Location: [facility] OR [facility];  Service: Peripheral Vascular;  Laterality: Left;    REVISION OF ARTERIOVENOUS FISTULA Left 05/01/2023    Procedure: REVISION, AV FISTULA;  Surgeon: FARIDEH Muñoz III, MD;  Location: [facility] OR [facility];  Service: Peripheral Vascular;  Laterality: Left;  LUE AVG revision    REVISION OF PROCEDURE INVOLVING ARTERIOVENOUS GRAFT Left 12/28/2022    Procedure: REVISION, PROCEDURE INVOLVING ARTERIOVENOUS GRAFT;  Surgeon: FARIDEH Muñoz III, MD;  Location: [facility] OR [facility];  Service: Peripheral Vascular;  Laterality: Left;    REVISION OF PROCEDURE INVOLVING ARTERIOVENOUS GRAFT Left 07/21/2023    Procedure: LEFT ARM ARTERIOVENOUS GRAFT EXCISION  AND LIGATION;  Surgeon: Obi Werner MD;  Location: [facility] OR;  Service: Vascular;  Laterality: Left;  Left AVG excision and revision with interposition    SALPINGOOPHORECTOMY Right 2016    KJB---GENE     TONSILLECTOMY      TUBAL LIGATION         FamHx:  Family History   Problem Relation Age of Onset    Hypertension Mother     Diabetes Father     Kidney disease Father     No Known Problems Sister     Heart disease Sister     No Known Problems Sister     No Known Problems Brother     No Known Problems Brother     Cataracts Maternal Aunt     No Known Problems Maternal Uncle     No Known Problems Paternal Aunt     No Known Problems Paternal Uncle     No Known Problems Maternal Grandmother     Diabetes Maternal Grandfather     No Known Problems Paternal Grandmother     No Known Problems Paternal Grandfather     No Known Problems Son     No Known Problems Son     No Known Problems Other     Breast cancer Neg Hx     Colon cancer Neg Hx     Cancer Neg Hx     Stroke Neg Hx     Amblyopia Neg Hx     Blindness Neg Hx     Glaucoma Neg Hx     Macular degeneration Neg Hx     Retinal detachment Neg Hx     Strabismus Neg Hx     Thyroid disease Neg Hx        SocHx:  Social History     Socioeconomic History    Marital status:     Number of children: 2   Occupational History    Occupation: [employer]     Employer: [id number]   Tobacco Use    Smoking status: Never     Passive exposure: Never    Smokeless tobacco: Never   Substance and Sexual Activity    Alcohol use: No    Drug use: Yes     Types: Hydrocodone    Sexual activity: Not Currently     Social Determinants of Health     Financial Resource Strain: Low Risk  (9/28/2023)    Overall Financial Resource Strain (CARDIA)     Difficulty of Paying Living Expenses: Not hard at all   Food Insecurity: No Food Insecurity (9/28/2023)    Hunger Vital Sign     Worried About Running Out of Food in the Last Year: Never true     Ran Out of Food in the Last Year: Never true   Transportation Needs: No Transportation Needs (9/28/2023)    PRAPARE - Transportation     Lack of Transportation (Medical): No     Lack of Transportation (Non-Medical): No   Physical Activity: Inactive (9/28/2023)     Exercise Vital Sign     Days of Exercise per Week: 0 days     Minutes of Exercise per Session: 0 min   Stress: No Stress Concern Present (9/28/2023)    [employer] [place] [employer] - Occupational Stress Questionnaire     Feeling of Stress : Not at all   Social Connections: Moderately Integrated (9/28/2023)    Social Connection and Isolation Panel [NHANES]     Frequency of Communication with Friends and Family: More than three times a week     Frequency of Social Gatherings with Friends and Family: More than three times a week     Attends Christianity Services: More than 4 times per year     Active Member of Clubs or Organizations: No     Attends Club or Organization Meetings: Never     Marital Status:    Housing Stability: Low Risk  (9/28/2023)    Housing Stability Vital Sign     Unable to Pay for Housing in the Last Year: No     Number of Places Lived in the Last Year: 1     Unstable Housing in the Last Year: No         Objective:     Vitals:    02/19/24 1017   BP: 121/82   Pulse: 99   Resp: 14   Temp: 98.1 °F (36.7 °C)           Physical Exam  Vitals and nursing note reviewed.   Constitutional:       General: She is not in acute distress.     Appearance: She is well-developed.      Comments: Thin appearance, in wheelchair   HENT:      Head: Normocephalic and atraumatic.      Nose: Nose normal.      Mouth/Throat:      Pharynx: No oropharyngeal exudate.   Eyes:      General:         Right eye: No discharge.         Left eye: No discharge.      Conjunctiva/sclera: Conjunctivae normal.      Pupils: Pupils are equal, round, and reactive to light.   Neck:      Trachea: No tracheal deviation.   Cardiovascular:      Comments: No swelling to bilateral lower extremities.   Musculoskeletal:         General: No tenderness. Normal range of motion.      Comments:      Skin:     General: Skin is warm and dry.      Coloration: Skin is not pale.      Findings: No erythema or rash.      Comments: Ulcerations on  LE   Neurological:      Mental Status: She is alert and oriented to person, place, and time.   Psychiatric:         Behavior: Behavior normal.         Thought Content: Thought content normal.        LABS:  WBC   Date Value Ref Range Status   01/04/2024 6.18 3.90 - 12.70 K/uL Final     Hemoglobin   Date Value Ref Range Status   01/04/2024 12.6 12.0 - 16.0 g/dL Final     POC Hematocrit   Date Value Ref Range Status   10/10/2023 32 (L) 36 - 54 %PCV Final     Hematocrit   Date Value Ref Range Status   01/04/2024 43.8 37.0 - 48.5 % Final     Platelets   Date Value Ref Range Status   01/04/2024 311 150 - 450 K/uL Final       Chemistry        Component Value Date/Time     01/04/2024 0430    K 4.5 01/04/2024 0430     01/04/2024 0430    CO2 18 (L) 01/04/2024 0430    BUN 45 (H) 01/04/2024 0430    CREATININE 6.9 (H) 01/04/2024 0430    GLU 75 01/04/2024 0430        Component Value Date/Time    CALCIUM 10.3 01/04/2024 0430    ALKPHOS 606 (H) 01/02/2024 1023    AST 63 (H) 01/02/2024 1023    ALT 28 01/02/2024 1023    BILITOT 1.9 (H) 01/02/2024 1023        Ankle Brachial Indices (NUVIA)  Right lower extremity pressures and waveforms indicate moderate to severe   arterial occlusive disease.  Left lower extremity pressures and waveforms indicate moderate to severe   arterial occlusive disease.  Ankle pressures are non-compressible indicating possible medial   calcinosis.  CV Ultrasound doppler arterial leg left  Left lower extremity arterial ultrasound shows decreased external iliac   artery flow indicating proximal stenosis with decreased distal   femoropopliteal artery flow, heavily calcified arteries, an occluded PT   and peroneal arteries.      Assessment:       1. Metastatic renal cell carcinoma to intra-abdominal site            Plan:        Cancer Staging   Renal cell carcinoma of left kidney  Staging form: Kidney, AJCC 8th Edition  - Pathologic stage from 1/12/2016: Stage Unknown (pT1b(2), pNX, cM0) - Unsigned  -  Clinical stage from 11/22/2016: Stage IV (rcTX, cN0, pM1) - Signed by Liset Ngo NP on 9/13/2023    Pt has had SD for long time on [place], and last scans were largely stable, however, no longer able to tolerate therapy due to comorbidities (HD/ESRD, etc) and LE infection/wounds.  She was most recently enrolled on hospice but was then told she was not allowed to continue dialysis. Long discussion with patient and her accompanying  regarding current clinical status and that she does not qualify for further RCC treatment. They are in agreement and would like see current status of cancer activity, which is not unreasonable.     Patient no showeed to ordered scan/labs. Will reschedule and re-discuss at next visit    RTC 2-3 weeks with CT scan, labs (CBC,CMP,PSA), and to see Dr. Rivera.    Patient was also seen and examined by Dr. Rivera. Patient is in agreement with the proposed treatment plan. All questions were answered to the patient's satisfaction. Pt knows to call clinic if anything is needed before the next clinic visit.    Axel Krueger MD  Hematology/Oncology PGY-IV           I have reviewed the notes, assessments, and/or procedures performed by the housestaff, as above.  I have personally interviewed and examined the patient at the beside, and rounded with the housestaff. I concur with her/his assessment and plan and the documentation of Eva Bloom.      Pt with declining PS. Recently disenrolled from hospice.  Holding therapy.  Awaiting CT scans for restaging.       More than 40 mins total time were spent during this encounter.      Ben Rivera M.D., M.S., F.A.C.P.  Hematology/Oncology Attending  [facility]               Med Onc Chart Routing  Urgent    Follow up with physician . Follow up with labs about 3-4d after scan is completed   Follow up with [facility]    Infusion scheduling note    Injection scheduling note    Labs CBC and CMP   Scheduling:  Preferred  lab:  Lab interval:     Imaging CT chest abdomen pelvis   please schedule scan for Monday, wednesday, or friday morning   Pharmacy appointment    Other referrals

## 2024-02-20 ENCOUNTER — HOSPITAL ENCOUNTER (OUTPATIENT)
Dept: RADIOLOGY | Facility: HOSPITAL | Age: 59
Discharge: HOME | End: 2024-02-20
Attending: ORTHOPAEDIC SURGERY
Payer: MEDICARE

## 2024-02-20 DIAGNOSIS — S52.501A CLOSED FRACTURE OF DISTAL ENDS OF RIGHT RADIUS AND ULNA, INITIAL ENCOUNTER: ICD-10-CM

## 2024-02-20 DIAGNOSIS — S52.601A CLOSED FRACTURE OF DISTAL ENDS OF RIGHT RADIUS AND ULNA, INITIAL ENCOUNTER: ICD-10-CM

## 2024-02-20 PROCEDURE — 73110 X-RAY EXAM OF WRIST: CPT | Mod: 26,GV,RT, | Performed by: RADIOLOGY

## 2024-02-20 PROCEDURE — 73110 X-RAY EXAM OF WRIST: CPT | Mod: TC,FY,PO,RT

## 2024-02-21 ENCOUNTER — OFFICE VISIT (OUTPATIENT)
Dept: ORTHOPEDICS | Facility: CLINIC | Age: 59
End: 2024-02-21
Payer: MEDICARE

## 2024-02-21 DIAGNOSIS — S52.501A CLOSED FRACTURE OF DISTAL ENDS OF RIGHT RADIUS AND ULNA, INITIAL ENCOUNTER: Primary | ICD-10-CM

## 2024-02-21 DIAGNOSIS — S52.601A CLOSED FRACTURE OF DISTAL ENDS OF RIGHT RADIUS AND ULNA, INITIAL ENCOUNTER: Primary | ICD-10-CM

## 2024-02-21 PROCEDURE — 99214 OFFICE O/P EST MOD 30 MIN: CPT | Mod: PBBFAC,PN | Performed by: ORTHOPAEDIC SURGERY

## 2024-02-21 PROCEDURE — 99999 PR PBB SHADOW E&M-EST. PATIENT-LVL IV: CPT | Mod: PBBFAC,,, | Performed by: ORTHOPAEDIC SURGERY

## 2024-02-21 PROCEDURE — 99213 OFFICE O/P EST LOW 20 MIN: CPT | Mod: S$PBB,GW,, | Performed by: ORTHOPAEDIC SURGERY

## 2024-02-21 NOTE — PROGRESS NOTES
Assessment: 58 y.o. female with R minimally displaced DR and DU fractures    I explained my diagnostic impression and the reasoning behind it in detail, using layman's terms.      Plan:   - ROM hand and wrist  - no lifting more than 5 lbs  - EXOS splint with activity   - Return to clinic in 4 weeks with new xrays. Return sooner if splints becomes loose as hand swelling goes down      All questions were answered in detail. The patient is in full agreement with the treatment plan and will proceed accordingly.    Chief Complaint   Patient presents with    Right Wrist - Injury, Pain, Swelling       Initial visit (1/31/23): Eva Bloom is a 58 y.o. female who presents today complaining of Injury, Pain, and Swelling of the Right Wrist     DOI: 1/16/24  Fall   Sustained fracture of R DR  Splinted   Here for follow up  Denies numbness, tingling     2/21/24  5 weeks out from injury   Pain is intermittent     This is the extent of the patient's complaints at this time.     Hand dominance: Right          Review of patient's allergies indicates:   Allergen Reactions    Carvedilol Other (See Comments)     Nausea/vomiting    Allopurinol      Other reaction(s): abnormal transaminases         Current Outpatient Medications:     acetaminophen (TYLENOL) 500 MG tablet, Take 500 mg by mouth every 6 (six) hours as needed for Pain., Disp: , Rfl:     acetaminophen (TYLENOL) 500 MG tablet, Take 1 tablet (500 mg total) by mouth every 4 (four) hours as needed for Pain or Temperature greater than (100.5 or greater)., Disp: 30 tablet, Rfl: 0    albuterol-ipratropium (DUO-NEB) 2.5 mg-0.5 mg/3 mL nebulizer solution, Take by nebulization., Disp: , Rfl:     apixaban (ELIQUIS) 2.5 mg Tab, Take 1 tablet (2.5 mg total) by mouth 2 (two) times daily., Disp: 60 tablet, Rfl: 11    aspirin (ECOTRIN) 81 MG EC tablet, Take 1 tablet (81 mg total) by mouth once daily., Disp: 90 tablet, Rfl: 3    atorvastatin (LIPITOR) 40 MG tablet, Take 1 tablet (40 mg  total) by mouth once daily., Disp: 90 tablet, Rfl: 0    bisacodyL (DULCOLAX) 10 mg Supp, Place 10 mg rectally., Disp: , Rfl:     cabozantinib (CABOMETYX) 60 mg Tab, TAKE ONE TABLET BY MOUTH ONCE DAILY AT THE SAME TIME ON AN EMPTY STOMACH AT LEAST 1 HOUR BEFORE OR 2 HOURS AFTER EATING. AVOID GRAPEFRUIT PRODUCTS, Disp: 30 tablet, Rfl: 4    cyclobenzaprine (FLEXERIL) 5 MG tablet, TAKE 1 TABLET BY MOUTH DAILY AS NEEDED FOR MUSCLE SPASMS., Disp: 30 tablet, Rfl: 1    epoetin david-epbx (RETACRIT INJ), Epoetin david - epbx (Retacrit), Disp: , Rfl:     hyoscyamine (LEVSIN/SL) 0.125 mg Subl, Place under the tongue., Disp: , Rfl:     lanthanum (FOSRENOL) 1000 MG chewable tablet, Take 1 tablet by mouth., Disp: , Rfl:     levothyroxine (SYNTHROID) 75 MCG tablet, TAKE 1 TABLET BY MOUTH EVERY DAY, Disp: 90 tablet, Rfl: 3    lidocaine-prilocaine (EMLA) cream, APPLY ATLEAST 30 MINUTES BEFORE TREATMENT 3 TIMES A WEEK, Disp: 30 g, Rfl: 11    LORAZEPAM INTENSOL 2 mg/mL Conc, Take by mouth., Disp: , Rfl:     metoprolol succinate (TOPROL-XL) 25 MG 24 hr tablet, Take 1 tablet (25 mg total) by mouth once daily., Disp: 30 tablet, Rfl: 11    mirtazapine (REMERON) 7.5 MG Tab, Take 1 tablet (7.5 mg total) by mouth every evening., Disp: 30 tablet, Rfl: 11    mv,Ca,min-folic acid-vit K1 (ONE-A-DAY WOMEN'S 50 PLUS) 400-20 mcg Tab, Take 1 tablet by mouth once daily., Disp: , Rfl:     naloxone (NARCAN) 1 mg/mL injection, 2 mg (1 mg per nostril) by Nasal route as needed for opioid overdose; may repeat in 3 to 5 minutes if not effective. Call 911, Disp: 2 mL, Rfl: 11    nystatin (MYCOSTATIN) cream, SMARTSIG:sparingly Topical 2-4 Times Daily PRN, Disp: , Rfl:     ondansetron (ZOFRAN-ODT) 4 MG TbDL, Take by mouth., Disp: , Rfl:     oxyCODONE (ROXICODONE) 5 MG immediate release tablet, Take 1 tablet (5 mg total) by mouth every 6 (six) hours as needed for Pain., Disp: 11 tablet, Rfl: 0    pantoprazole (PROTONIX) 40 MG tablet, Take 1 tablet (40 mg total)  by mouth 2 (two) times daily., Disp: 60 tablet, Rfl: 11    traMADoL (ULTRAM) 50 mg tablet, Take 1 tablet (50 mg total) by mouth every 12 (twelve) hours as needed for Pain., Disp: 30 each, Rfl: 1  No current facility-administered medications for this visit.    Facility-Administered Medications Ordered in Other Visits:     0.9%  NaCl infusion, , Intravenous, Continuous, Soni Bhatti MD, New Bag at 07/21/23 1116    Physical Exam:   Vitals:    02/21/24 0809   PainSc:   8   PainLoc: Wrist       General:  Patient is alert, awake and oriented to time, place and person. Mood and affect are appropriate.  Patient does not appear to be in any distress, denies any constitutional symptoms and appears stated age.   HEENT:  Pupils are equal and round, sclera are not injected. External examination of ears and nose reveals no abnormalities. Cranial nerves II-X are grossly intact  Skin:  no rashes, abrasions or open wounds on the affected extremity   Resp:  No respiratory distress or audible wheezing   CV: 2+  pulses, all extremities warm and well perfused   Right Wrist   hand swelling resolved  NTTP over DR  TTP over DU   LTSI m/u/r  2+ RP  + EPL, IO, FDS, FDP       Imaging: 3 views of the right wrist show vascular calcifications, non displaced fracture of the distal radius - no change in position. Small amount of callus visible on radial aspect of distal radius, no obvious callus to ulna    I personally reviewed and interpreted the patient's imaging obtained prior to visit        This note was created by combination of typed  and M-Modal dictation. Transcription and phonetic errors may be present.  If there are any questions, please contact me.    Past Medical History:   Diagnosis Date    Anemia     Anticoagulant long-term use     Atrial fibrillation     Bronchitis 03/01/2017    Cancer 2016    kidney cancer    CHF (congestive heart failure), NYHA class II, chronic, systolic     CMV (cytomegalovirus) antibody positive      CVA (cerebral vascular accident) 1/3/2024    Encounter for blood transfusion     ESRD (end stage renal disease)     Essential hypertension 09/23/2015    H/O herpes simplex type 2 infection     Herpes simplex type 1 antibody positive     History of kidney cancer     s/p left nephrectomy 1/2016    Hyperparathyroidism, unspecified     Hyperuricemia without signs of inflammatory arthritis and tophaceous disease     Kidney stones     LGSIL (low grade squamous intraepithelial dysplasia)     Myocardiopathy 07/21/2017    Prediabetes     Proteinuria     Renal disorder     Thyroid nodule     Urate nephropathy        Active Problem List with Overview Notes    Diagnosis Date Noted    Incontinence associated dermatitis 01/04/2024    CVA (cerebral vascular accident) 01/03/2024    Acute metabolic encephalopathy 01/02/2024    Acute blood loss anemia 11/01/2023    PAD (peripheral artery disease) 11/01/2023    Hypothermia 10/12/2023    Severe protein-calorie malnutrition 10/11/2023    ACP (advance care planning) 10/11/2023    Weight loss 10/10/2023    Body mass index (BMI) less than 19 09/28/2023    Hoarseness of voice 09/21/2023    Sensation of fullness in both ears 09/21/2023    Chronic combined systolic and diastolic heart failure 08/25/2023    Metastatic renal cell carcinoma to lung, unspecified laterality 08/11/2023    Hemorrhoid 07/24/2023    Critical limb ischemia of left lower extremity with gangrene 07/20/2023    Ischemic ulcer of toe of left foot, with unspecified severity 07/19/2023    Hypothyroidism 07/19/2023    Aortic atherosclerosis 07/06/2023    Arteriovenous graft infection 01/24/2023    Malfunction of arteriovenous dialysis fistula 12/16/2022    Debility 11/04/2022    PAF (paroxysmal atrial fibrillation) 07/25/2022    Glucose intolerance 04/18/2022    Hypercalcemia 04/04/2022    ICD (implantable cardioverter-defibrillator) in place - SubQ 07/15/2021    Hyperphosphatemia     Anemia in ESRD (end-stage renal  "disease)     Chronic kidney disease-mineral and bone disorder     Chronic combined systolic and diastolic congestive heart failure     Nonrheumatic mitral valve regurgitation 06/01/2020    ESRD (end stage renal disease) on dialysis      - Tuesday, Thursday, and Saturday  - Chelsie Hart, nephrology      Cardiomyopathy, nonischemic 07/21/2017    Pulmonary hypertension 07/21/2017    Primary insomnia 06/14/2017    Stenosis of arteriovenous dialysis fistula 02/17/2017    S/p nephrectomy left 10/07/2016    History of kidney cancer 09/02/2016    Renal cell carcinoma of left kidney 01/27/2016    Multinodular goiter 11/25/2015     - benign FNA 12/2015      CMV (cytomegalovirus) antibody positive     Herpes simplex type 1 antibody positive     H/O herpes simplex type 2 infection      - positive antibody test  - reports no hx of outbreaks      Essential hypertension 09/23/2015    Pap smear abnormality of cervix with ASCUS favoring benign 09/23/2015     She is having the PAP "once a year and comes back ok she says"      Urate nephropathy     Kidney stones     Hyperparathyroidism, secondary renal     Hyperuricemia without signs of inflammatory arthritis and tophaceous disease     Proteinuria        Past Surgical History:   Procedure Laterality Date    BREAST CYST EXCISION      COLONOSCOPY N/A 11/12/2015    COLONOSCOPY N/A 03/12/2021    Procedure: COLONOSCOPY;  Surgeon: Brendon Lanier MD;  Location: [facility];  Service: Endoscopy;  Laterality: N/A;  covid test 3/9, labs prior, prep instr mailed -ml    EXCISION OF ARTERIOVENOUS FISTULA Left 09/27/2023    Procedure: EXCISION, AV FISTULA;  Surgeon: FARIDEH Muñoz III, MD;  Location: [facility] OR [address] [place] [address];  Service: Vascular;  Laterality: Left;  LUE AV graft excision    INSERTION OF BIVENTRICULAR IMPLANTABLE CARDIOVERTER-DEFIBRILLATOR (ICD)  04/2021    INSERTION OF TUNNELED CENTRAL VENOUS CATHETER (CVC) WITH SUBCUTANEOUS PORT N/A 01/25/2023    Procedure: INSERTION, DUAL " LUMEN CATHETER WITH PORT, WITH IMAGING GUIDANCE;  Surgeon: FARIDEH Muñoz III, MD;  Location: [facility] OR [facility];  Service: Peripheral Vascular;  Laterality: N/A;  possinble permcath placment    NEPHRECTOMY-LAPAROSCOPIC Left 01/12/2016    PERCUTANEOUS TRANSLUMINAL ANGIOPLASTY OF ARTERIOVENOUS FISTULA Left 04/19/2023    Procedure: PTA, AV FISTULA;  Surgeon: FARIDEH Muñoz III, MD;  Location: [facility] CATH LAB;  Service: Peripheral Vascular;  Laterality: Left;    PERITONEAL CATHETER INSERTION      Permacath insertion  01/12/2017    PLACEMENT OF ARTERIOVENOUS GRAFT  12/28/2022    Procedure: INSERTION, GRAFT, ARTERIOVENOUS;  Surgeon: FARIDEH Muñoz III, MD;  Location: [facility] OR [facility];  Service: Peripheral Vascular;;    REMOVAL, GRAFT, ARTERIOVENOUS, UPPER EXTREMITY Left 01/25/2023    Procedure: REMOVAL, GRAFT, ARTERIOVENOUS, UPPER EXTREMITY;  Surgeon: FARIDEH Muñoz III, MD;  Location: [facility] OR [facility];  Service: Peripheral Vascular;  Laterality: Left;    REVISION OF ARTERIOVENOUS FISTULA Left 05/01/2023    Procedure: REVISION, AV FISTULA;  Surgeon: FARIDEH Muñoz III, MD;  Location: [facility] OR [facility];  Service: Peripheral Vascular;  Laterality: Left;  LUE AVG revision    REVISION OF PROCEDURE INVOLVING ARTERIOVENOUS GRAFT Left 12/28/2022    Procedure: REVISION, PROCEDURE INVOLVING ARTERIOVENOUS GRAFT;  Surgeon: FARIDEH Muñoz III, MD;  Location: [facility] OR [facility];  Service: Peripheral Vascular;  Laterality: Left;    REVISION OF PROCEDURE INVOLVING ARTERIOVENOUS GRAFT Left 07/21/2023    Procedure: LEFT ARM ARTERIOVENOUS GRAFT EXCISION  AND LIGATION;  Surgeon: Obi Werner MD;  Location: [facility];  Service: Vascular;  Laterality: Left;  Left AVG excision and revision with interposition    SALPINGOOPHORECTOMY Right 2016    KJB---DAVINCI    TONSILLECTOMY      TUBAL LIGATION         Family History   Problem Relation Age of Onset    Hypertension Mother     Diabetes Father     Kidney disease Father     No Known  Problems Sister     Heart disease Sister     No Known Problems Sister     No Known Problems Brother     No Known Problems Brother     Cataracts Maternal Aunt     No Known Problems Maternal Uncle     No Known Problems Paternal Aunt     No Known Problems Paternal Uncle     No Known Problems Maternal Grandmother     Diabetes Maternal Grandfather     No Known Problems Paternal Grandmother     No Known Problems Paternal Grandfather     No Known Problems Son     No Known Problems Son     No Known Problems Other     Breast cancer Neg Hx     Colon cancer Neg Hx     Cancer Neg Hx     Stroke Neg Hx     Amblyopia Neg Hx     Blindness Neg Hx     Glaucoma Neg Hx     Macular degeneration Neg Hx     Retinal detachment Neg Hx     Strabismus Neg Hx     Thyroid disease Neg Hx

## 2024-02-22 ENCOUNTER — HOSPITAL ENCOUNTER (OUTPATIENT)
Dept: WOUND CARE | Facility: HOSPITAL | Age: 59
Discharge: HOME | End: 2024-02-22
Attending: SURGERY
Payer: MEDICARE

## 2024-02-22 VITALS
BODY MASS INDEX: 15.8 KG/M2 | HEART RATE: 97 BPM | DIASTOLIC BLOOD PRESSURE: 69 MMHG | WEIGHT: 92.56 LBS | HEIGHT: 64 IN | TEMPERATURE: 96 F | SYSTOLIC BLOOD PRESSURE: 111 MMHG

## 2024-02-22 DIAGNOSIS — L97.529 ISCHEMIC TOE ULCER, LEFT, WITH UNSPECIFIED SEVERITY: ICD-10-CM

## 2024-02-22 DIAGNOSIS — L89.620 PRESSURE INJURY OF LEFT HEEL, UNSTAGEABLE: Primary | ICD-10-CM

## 2024-02-22 DIAGNOSIS — S71.101A OPEN WOUND OF RIGHT THIGH, INITIAL ENCOUNTER: ICD-10-CM

## 2024-02-22 PROCEDURE — 99215 OFFICE O/P EST HI 40 MIN: CPT | Mod: GV,,, | Performed by: FAMILY MEDICINE

## 2024-02-22 PROCEDURE — 99214 OFFICE O/P EST MOD 30 MIN: CPT | Performed by: FAMILY MEDICINE

## 2024-02-22 NOTE — PROGRESS NOTES
"[facility]                Progress Note    Subjective:       Patient ID: Eva Bloom is a 58 y.o. female.    Chief Complaint: Wound Care    Initial wound care visit.  Patient arrived to clinic with spouse in wheel chair, transferred to exam chair with assistance. Spouse states that patient has just had dialysis (T, Th and Sat) and is "wiped out" per usual. She classifies pain to wounds as a 5/10. Wounds to toe is dry and open to air, left heel wound dressed with gauze and tape. Wound to right thigh covered by five band aids.  Spouse states that he has been using betadine on her wounds and will soak patient's feet in betadine and water for about five minutes. History and medications reviewed with spouse stating that since she has been off of [facility], that he has been trying to get her medication straight. Patient had stents put in left leg in July 2023, also per spouse.    Return to clinic in one week.  Next appointment is on 3/4/2024. AVS printed and given to patient.          Review of Systems   Constitutional: Negative.    HENT: Negative.     Eyes: Negative.    Respiratory: Negative.     Cardiovascular: Negative.    Gastrointestinal: Negative.    Endocrine: Negative.    Musculoskeletal: Negative.    Skin:  Positive for wound.   All other systems reviewed and are negative.        Objective:        Physical Exam  Vitals reviewed.   Constitutional:       Appearance: She is well-developed. She is cachectic.   HENT:      Head: Normocephalic and atraumatic.   Eyes:      Extraocular Movements: Extraocular movements intact.      Conjunctiva/sclera: Conjunctivae normal.      Pupils: Pupils are equal, round, and reactive to light.   Skin:     General: Skin is warm and dry.      Comments: See wound description for further information   Neurological:      Mental Status: She is alert and oriented to person, place, and time.   Psychiatric:         Mood and Affect: " Mood normal.         Behavior: Behavior normal.         Vitals:    02/22/24 0920   BP: 111/69   Pulse: 97   Temp: 96.4 °F (35.8 °C)       Assessment:           ICD-10-CM ICD-9-CM   1. Pressure injury of left heel, unstageable  L89.620 707.07     707.25   2. Open wound of right thigh, initial encounter  S71.101A 890.0   3. Ischemic toe ulcer, left, with unspecified severity  L97.529 707.15            Altered Skin Integrity 11/01/23 2027 Right lateral Thigh (Active)   11/01/23 2027   Altered Skin Integrity Present on Admission - Did Patient arrive to the hospital with altered skin?: yes   Side: Right   Orientation: lateral   Location: Thigh   Wound Number:    Is this injury device related?:    Primary Wound Type:    Description of Altered Skin Integrity:    Ankle-Brachial Index:    Pulses:    Removal Indication and Assessment: removed per policy   Wound Outcome:    (Retired) Wound Length (cm):    (Retired) Wound Width (cm):    (Retired) Depth (cm):    Wound Description (Comments):    Removal Indications:    Wound Image   02/22/24 0958   Description of Altered Skin Integrity Full thickness tissue loss. Subcutaneous fat may be visible but bone, tendon or muscle are not exposed 02/22/24 0958   Drainage Amount Scant 02/22/24 0958   Appearance Pink;Yellow;White;Moist 02/22/24 0958   Tissue loss description Full thickness 02/22/24 0958   Black (%), Wound Tissue Color 0 % 02/22/24 0958   Red (%), Wound Tissue Color 100 % 02/22/24 0958   Yellow (%), Wound Tissue Color 0 % 02/22/24 0958   Periwound Area Dry;Gray;Scar tissue 02/22/24 0958   Wound Edges Defined 02/22/24 0958   Wound Length (cm) 2 cm 02/22/24 0958   Wound Width (cm) 1.3 cm 02/22/24 0958   Wound Depth (cm) 0.3 cm 02/22/24 0958   Wound Volume (cm^3) 0.78 cm^3 02/22/24 0958   Wound Surface Area (cm^2) 2.6 cm^2 02/22/24 0958   Tunneling (depth (cm)/location) 0 02/22/24 0958   Undermining (depth (cm)/location) 0 02/22/24 0958   Care Cleansed with:;Sterile normal  saline 02/22/24 0958   Dressing Applied;Collagen;Silicone;Island/border 02/22/24 0958   Dressing Change Due 02/29/24 02/22/24 0958            Altered Skin Integrity 02/22/24 0930 Left dorsal Toe, third Ulceration (Active)   02/22/24 0930   Altered Skin Integrity Present on Admission - Did Patient arrive to the hospital with altered skin?: yes   Side: Left   Orientation: dorsal   Location: Toe, third   Wound Number:    Is this injury device related?:    Primary Wound Type: Ulceration   Description of Altered Skin Integrity:    Ankle-Brachial Index:    Pulses:    Removal Indication and Assessment:    Wound Outcome:    (Retired) Wound Length (cm):    (Retired) Wound Width (cm):    (Retired) Depth (cm):    Wound Description (Comments):    Removal Indications:    Wound Image   02/22/24 0958   Description of Altered Skin Integrity Full thickness tissue loss. Base is covered by slough and/or eschar in the wound bed 02/22/24 0958   Dressing Appearance No dressing;Open to air 02/22/24 0958   Drainage Amount None 02/22/24 0958   Appearance Yellow;Slough 02/22/24 0958   Tissue loss description Full thickness 02/22/24 0958   Yellow (%), Wound Tissue Color 100 % 02/22/24 0958   Periwound Area Dry 02/22/24 0958   Wound Edges Defined 02/22/24 0958   Wound Length (cm) 1.5 cm 02/22/24 0958   Wound Width (cm) 1.5 cm 02/22/24 0958   Wound Depth (cm) 0.1 cm 02/22/24 0958   Wound Volume (cm^3) 0.225 cm^3 02/22/24 0958   Wound Surface Area (cm^2) 2.25 cm^2 02/22/24 0958   Tunneling (depth (cm)/location) 0 02/22/24 0958   Undermining (depth (cm)/location) 0 02/22/24 0958   Care Cleansed with:;Sterile normal saline;Applied:;Povidone iodine 02/22/24 0958            Altered Skin Integrity 02/22/24 0930 Left plantar Heel Ulceration (Active)   02/22/24 0930   Altered Skin Integrity Present on Admission - Did Patient arrive to the hospital with altered skin?:    Side: Left   Orientation: plantar   Location: Heel   Wound Number:    Is this  injury device related?:    Primary Wound Type: Ulceration   Description of Altered Skin Integrity:    Ankle-Brachial Index:    Pulses:    Removal Indication and Assessment:    Wound Outcome:    (Retired) Wound Length (cm):    (Retired) Wound Width (cm):    (Retired) Depth (cm):    Wound Description (Comments):    Removal Indications:    Wound Image   02/22/24 0958   Description of Altered Skin Integrity Full thickness tissue loss. Base is covered by slough and/or eschar in the wound bed 02/22/24 0958   Dressing Appearance No dressing;Open to air 02/22/24 0958   Drainage Amount None 02/22/24 0958   Appearance Dry 02/22/24 0958   Black (%), Wound Tissue Color 100 % 02/22/24 0958   Red (%), Wound Tissue Color 0 % 02/22/24 0958   Yellow (%), Wound Tissue Color 0 % 02/22/24 0958   Periwound Area Dry 02/22/24 0958   Wound Length (cm) 2 cm 02/22/24 0958   Wound Width (cm) 3 cm 02/22/24 0958   Wound Depth (cm) 0.1 cm 02/22/24 0958   Wound Volume (cm^3) 0.6 cm^3 02/22/24 0958   Wound Surface Area (cm^2) 6 cm^2 02/22/24 0958   Tunneling (depth (cm)/location) 0 02/22/24 0958   Undermining (depth (cm)/location) 0 02/22/24 0958   Care Cleansed with:;Sterile normal saline;Applied:;Povidone iodine 02/22/24 0958   Dressing Applied;Gauze;Other (comment) 02/22/24 0958           Plan:          Debridement not needed and Not Done today.   Continue off-loading / leg elevation   Continue eating protein   Keep dressing clean and intact  Continue with wound care orders and plan as noted in orders.   Continue to follow current medication regimen as per pcp   Call for any questions / concerns.       Tissue pathology and/or culture taken     [] Yes      [x] No  Sharp debridement performed                   [] Yes       [x] No  Labs ordered     [] Yes       [x] No  Imaging ordered    [] Yes      [x] No    Orders Placed This Encounter   Procedures    Ambulatory referral/consult to Wound Clinic     Standing Status:   Standing     Number of  Occurrences:   1     Referral Priority:   Urgent     Referral Type:   Consultation     Referral Reason:   Specialty Services Required     Requested Specialty:   Wound Care     Number of Visits Requested:   1    Ambulatory referral/consult to Home Health     Standing Status:   Future     Standing Expiration Date:   3/22/2025     Referral Priority:   Routine     Referral Type:   Home Health     Referral Reason:   Specialty Services Required     Requested Specialty:   Home Health Services     Number of Visits Requested:   1    Change dressing     For right lateral thigh wound:   Dressing change frequency once a week - next appointment 3/4/2024  Remove old dressing.  Cleanse or irrigate with: Normal Saline  Protect periwound with:  periwound: Cavilon   Primary dressing - adjust to fit: WOUND BASE: Endoform  Secondary dressing: Bordered Foam  For Left third toe and Left heel.  Patient is using betadine to treat wounds on foot, daily.        Follow up in 11 days (on 3/4/2024) for wound care, Wound Care.

## 2024-02-23 ENCOUNTER — LAB VISIT (OUTPATIENT)
Dept: LAB | Facility: HOSPITAL | Age: 59
End: 2024-02-23
Attending: INTERNAL MEDICINE
Payer: MEDICARE

## 2024-02-23 ENCOUNTER — TELEPHONE (OUTPATIENT)
Dept: HEMATOLOGY/ONCOLOGY | Facility: CLINIC | Age: 59
End: 2024-02-23
Payer: MEDICARE

## 2024-02-23 DIAGNOSIS — C64.9 METASTATIC RENAL CELL CARCINOMA, UNSPECIFIED LATERALITY: ICD-10-CM

## 2024-02-23 LAB
ALBUMIN SERPL BCP-MCNC: 2.8 G/DL (ref 3.5–5.2)
ALP SERPL-CCNC: 379 U/L (ref 55–135)
ALT SERPL W/O P-5'-P-CCNC: 11 U/L (ref 10–44)
ANION GAP SERPL CALC-SCNC: 10 MMOL/L (ref 8–16)
AST SERPL-CCNC: 16 U/L (ref 10–40)
BASOPHILS # BLD AUTO: 0.11 K/UL (ref 0–0.2)
BASOPHILS NFR BLD: 1.9 % (ref 0–1.9)
BILIRUB SERPL-MCNC: 1.1 MG/DL (ref 0.1–1)
BUN SERPL-MCNC: 37 MG/DL (ref 6–20)
CALCIUM SERPL-MCNC: 10.4 MG/DL (ref 8.7–10.5)
CHLORIDE SERPL-SCNC: 104 MMOL/L (ref 95–110)
CO2 SERPL-SCNC: 25 MMOL/L (ref 23–29)
CREAT SERPL-MCNC: 5.3 MG/DL (ref 0.5–1.4)
DIFFERENTIAL METHOD BLD: ABNORMAL
EOSINOPHIL # BLD AUTO: 0.3 K/UL (ref 0–0.5)
EOSINOPHIL NFR BLD: 5.1 % (ref 0–8)
ERYTHROCYTE [DISTWIDTH] IN BLOOD BY AUTOMATED COUNT: 21.5 % (ref 11.5–14.5)
EST. GFR  (NO RACE VARIABLE): 9 ML/MIN/1.73 M^2
GLUCOSE SERPL-MCNC: 109 MG/DL (ref 70–110)
HCT VFR BLD AUTO: 45.9 % (ref 37–48.5)
HGB BLD-MCNC: 13.6 G/DL (ref 12–16)
IMM GRANULOCYTES # BLD AUTO: 0.03 K/UL (ref 0–0.04)
IMM GRANULOCYTES NFR BLD AUTO: 0.5 % (ref 0–0.5)
LYMPHOCYTES # BLD AUTO: 1.2 K/UL (ref 1–4.8)
LYMPHOCYTES NFR BLD: 20.2 % (ref 18–48)
MCH RBC QN AUTO: 26.5 PG (ref 27–31)
MCHC RBC AUTO-ENTMCNC: 29.6 G/DL (ref 32–36)
MCV RBC AUTO: 90 FL (ref 82–98)
MONOCYTES # BLD AUTO: 0.8 K/UL (ref 0.3–1)
MONOCYTES NFR BLD: 13.4 % (ref 4–15)
NEUTROPHILS # BLD AUTO: 3.4 K/UL (ref 1.8–7.7)
NEUTROPHILS NFR BLD: 58.9 % (ref 38–73)
NRBC BLD-RTO: 0 /100 WBC
PLATELET # BLD AUTO: 175 K/UL (ref 150–450)
PMV BLD AUTO: 12.4 FL (ref 9.2–12.9)
POTASSIUM SERPL-SCNC: 3.4 MMOL/L (ref 3.5–5.1)
PROT SERPL-MCNC: 7.4 G/DL (ref 6–8.4)
RBC # BLD AUTO: 5.13 M/UL (ref 4–5.4)
SODIUM SERPL-SCNC: 139 MMOL/L (ref 136–145)
WBC # BLD AUTO: 5.84 K/UL (ref 3.9–12.7)

## 2024-02-23 PROCEDURE — 80053 COMPREHEN METABOLIC PANEL: CPT | Performed by: INTERNAL MEDICINE

## 2024-02-23 PROCEDURE — 36415 COLL VENOUS BLD VENIPUNCTURE: CPT | Mod: PO | Performed by: INTERNAL MEDICINE

## 2024-02-23 PROCEDURE — 85025 COMPLETE CBC W/AUTO DIFF WBC: CPT | Performed by: INTERNAL MEDICINE

## 2024-02-28 ENCOUNTER — TELEPHONE (OUTPATIENT)
Dept: VASCULAR SURGERY | Facility: CLINIC | Age: 59
End: 2024-02-28

## 2024-02-29 ENCOUNTER — DOCUMENT SCAN (OUTPATIENT)
Dept: HOME HEALTH SERVICES | Facility: HOSPITAL | Age: 59
End: 2024-02-29
Payer: MEDICARE

## 2024-03-04 ENCOUNTER — HOSPITAL ENCOUNTER (OUTPATIENT)
Dept: WOUND CARE | Facility: HOSPITAL | Age: 59
Discharge: HOME | End: 2024-03-04
Attending: INTERNAL MEDICINE
Payer: MEDICARE

## 2024-03-04 ENCOUNTER — TELEPHONE (OUTPATIENT)
Dept: VASCULAR SURGERY | Facility: CLINIC | Age: 59
End: 2024-03-04
Payer: MEDICARE

## 2024-03-04 ENCOUNTER — HOSPITAL ENCOUNTER (OUTPATIENT)
Dept: RADIOLOGY | Facility: HOSPITAL | Age: 59
Discharge: HOME | End: 2024-03-04
Attending: STUDENT IN AN ORGANIZED HEALTH CARE EDUCATION/TRAINING PROGRAM
Payer: MEDICARE

## 2024-03-04 VITALS — HEART RATE: 94 BPM | TEMPERATURE: 97 F | RESPIRATION RATE: 18 BRPM

## 2024-03-04 DIAGNOSIS — I50.22 CHRONIC SYSTOLIC HEART FAILURE: ICD-10-CM

## 2024-03-04 DIAGNOSIS — C64.9 METASTATIC RENAL CELL CARCINOMA TO INTRA-ABDOMINAL SITE: ICD-10-CM

## 2024-03-04 DIAGNOSIS — L97.429: Primary | ICD-10-CM

## 2024-03-04 DIAGNOSIS — N18.6 ESRD (END STAGE RENAL DISEASE) ON DIALYSIS: ICD-10-CM

## 2024-03-04 DIAGNOSIS — Z99.2 ESRD (END STAGE RENAL DISEASE) ON DIALYSIS: ICD-10-CM

## 2024-03-04 DIAGNOSIS — L89.893 PRESSURE ULCER OF RIGHT LEG, STAGE 3: ICD-10-CM

## 2024-03-04 DIAGNOSIS — C64.9 METASTATIC RENAL CELL CARCINOMA TO LUNG, UNSPECIFIED LATERALITY: ICD-10-CM

## 2024-03-04 DIAGNOSIS — C79.89 METASTATIC RENAL CELL CARCINOMA TO INTRA-ABDOMINAL SITE: ICD-10-CM

## 2024-03-04 DIAGNOSIS — I70.244: Primary | ICD-10-CM

## 2024-03-04 DIAGNOSIS — L89.620 PRESSURE INJURY OF LEFT HEEL, UNSTAGEABLE: ICD-10-CM

## 2024-03-04 DIAGNOSIS — C78.00 METASTATIC RENAL CELL CARCINOMA TO LUNG, UNSPECIFIED LATERALITY: ICD-10-CM

## 2024-03-04 PROCEDURE — 99213 OFFICE O/P EST LOW 20 MIN: CPT | Mod: 25 | Performed by: INTERNAL MEDICINE

## 2024-03-04 PROCEDURE — 74176 CT ABD & PELVIS W/O CONTRAST: CPT | Mod: 26,,, | Performed by: RADIOLOGY

## 2024-03-04 PROCEDURE — 71250 CT THORAX DX C-: CPT | Mod: TC

## 2024-03-04 PROCEDURE — 71250 CT THORAX DX C-: CPT | Mod: 26,,, | Performed by: RADIOLOGY

## 2024-03-04 PROCEDURE — 99214 OFFICE O/P EST MOD 30 MIN: CPT | Mod: GV,,, | Performed by: INTERNAL MEDICINE

## 2024-03-04 PROCEDURE — 74176 CT ABD & PELVIS W/O CONTRAST: CPT | Mod: TC

## 2024-03-04 NOTE — PROGRESS NOTES
"[facility]                Progress Note    Subjective:       Patient ID: Eva Bloom is a 58 y.o. female.    Chief Complaint: Non-healing Wound Follow Up    Follow up wound care visit. Patient brought to exam room in wheelchair, by . Patient is wearing Darco to the Right and Left Foot. Patient was able to ambulate from the wheelchair to the exam chair with  at side. Denies fever, chills, nausea, vomiting, or diarrhea at present. No c/o pain to wound beds at present. Dressing to the Right Lateral Thigh is intact, with no strike though drainage.  is the caretaker of his wife and he is up to date on all her care as well as her appointments.  reports Home Health came out as order. Right Lateral Thigh is tender to the touch. Patient stated "she took Tylenol before coming to appointment." Tropical 5% ointment was applied and allowed to absorb for 15 minutes to help with pain management. AVS printed and given to patient's .       Patient with left heel/left third toe and right medial thigh ulcerations. She has HFrEF and known PAD with recent NUVIA showing both proximal and distal stenosis on left. She also has metastatic renal cell carcinoma (no longer on systemic therapy) she has been getting hemodialysis from a right subclavian permacath.  and patient report no hypotension with dialysis. She does spend > 50% of day in chair or bed. She is ambulating in the home with a walker but using wheelchair for all mobility outside the home.  is painting heel and toe wound daily with betadine. Home health wound care nurse changing foam border to right thigh wound twice per week no drainage from any wounds    Return to clinic in 3 week.      Review of Systems      Objective:        Physical Exam  Constitutional:       Appearance: She is ill-appearing.   Pulmonary:      Effort: Pulmonary effort is normal. No respiratory distress. "   Musculoskeletal:      Right lower leg: No edema.      Left lower leg: No edema.   Skin:     Comments: Left anterior third toe with firm adherent eschar no fluctuance    Left heel with firm black eschar no fluctuance or expressible discharge    Right thigh wound measures smaller pink granulation tissue centrally no exposed muscle layer or bone   Neurological:      Mental Status: She is alert.         Vitals:    03/04/24 0832   BP: (P) 131/87   Pulse: 94   Resp: 18   Temp: 97.2 °F (36.2 °C)       Assessment:           ICD-10-CM ICD-9-CM   1. Ischemic ulcer of left heel due to atherosclerosis  I70.244 440.23    L97.429 707.14   2. Chronic systolic heart failure  I50.22 428.22   3. ESRD (end stage renal disease) on dialysis  N18.6 585.6    Z99.2 V45.11   4. Metastatic renal cell carcinoma to lung, unspecified laterality  C78.00 197.0    C64.9 189.0   5. Pressure injury of left heel, unstageable  L89.620 707.07     707.25   6. Pressure ulcer of right leg, stage 3  L89.893 707.09     707.23            Altered Skin Integrity 11/01/23 2027 Right lateral Thigh (Active)   11/01/23 2027   Altered Skin Integrity Present on Admission - Did Patient arrive to the hospital with altered skin?: yes   Side: Right   Orientation: lateral   Location: Thigh   Wound Number:    Is this injury device related?:    Primary Wound Type:    Description of Altered Skin Integrity:    Ankle-Brachial Index:    Pulses:    Removal Indication and Assessment: removed per policy   Wound Outcome:    (Retired) Wound Length (cm):    (Retired) Wound Width (cm):    (Retired) Depth (cm):    Wound Description (Comments):    Removal Indications:    Wound Image   03/04/24 0911   Description of Altered Skin Integrity Full thickness tissue loss. Subcutaneous fat may be visible but bone, tendon or muscle are not exposed 03/04/24 0911   Dressing Appearance Intact;Moist drainage 03/04/24 0911   Drainage Amount Scant 03/04/24 0911   Drainage Characteristics/Odor  Purulent 03/04/24 0911   Appearance Moist;Pink;Yellow 03/04/24 0911   Tissue loss description Full thickness 03/04/24 0911   Red (%), Wound Tissue Color 100 % 03/04/24 0911   Periwound Area Scar tissue 03/04/24 0911   Wound Edges Open;Defined 03/04/24 0911   Wound Length (cm) 2 cm 03/04/24 0911   Wound Width (cm) 0.7 cm 03/04/24 0911   Wound Depth (cm) 0.3 cm 03/04/24 0911   Wound Volume (cm^3) 0.42 cm^3 03/04/24 0911   Wound Surface Area (cm^2) 1.4 cm^2 03/04/24 0911   Care Cleansed with:;Sterile normal saline 03/04/24 0911   Dressing Applied;Collagen;Island/border 03/04/24 0911   Periwound Care Moisture barrier applied 03/04/24 0911   Dressing Change Due 03/25/24 03/04/24 0911            Altered Skin Integrity 02/22/24 0930 Left dorsal Toe, third Ulceration (Active)   02/22/24 0930   Altered Skin Integrity Present on Admission - Did Patient arrive to the hospital with altered skin?: yes   Side: Left   Orientation: dorsal   Location: Toe, third   Wound Number:    Is this injury device related?:    Primary Wound Type: Ulceration   Description of Altered Skin Integrity:    Ankle-Brachial Index:    Pulses:    Removal Indication and Assessment:    Wound Outcome:    (Retired) Wound Length (cm):    (Retired) Wound Width (cm):    (Retired) Depth (cm):    Wound Description (Comments):    Removal Indications:    Wound Image   03/04/24 0911   Description of Altered Skin Integrity Full thickness tissue loss. Base is covered by slough and/or eschar in the wound bed 03/04/24 0911   Dressing Appearance Open to air;No dressing 03/04/24 0911   Drainage Amount None 03/04/24 0911   Appearance Intact;Yellow;Green;Pink 03/04/24 0911   Tissue loss description Full thickness 03/04/24 0911   Black (%), Wound Tissue Color 50 % 03/04/24 0911   Yellow (%), Wound Tissue Color 50 % 03/04/24 0911   Periwound Area Dry 03/04/24 0911   Wound Edges Defined 03/04/24 0911   Wound Length (cm) 1.4 cm 03/04/24 0911   Wound Width (cm) 1.5 cm 03/04/24  0911   Wound Depth (cm) 0.1 cm 03/04/24 0911   Wound Volume (cm^3) 0.21 cm^3 03/04/24 0911   Wound Surface Area (cm^2) 2.1 cm^2 03/04/24 0911   Care Cleansed with:;Sterile normal saline 03/04/24 0911   Dressing Applied 03/04/24 0911   Dressing Change Due 03/25/24 03/04/24 0911            Altered Skin Integrity 02/22/24 0930 Left plantar Heel Ulceration (Active)   02/22/24 0930   Altered Skin Integrity Present on Admission - Did Patient arrive to the hospital with altered skin?:    Side: Left   Orientation: plantar   Location: Heel   Wound Number:    Is this injury device related?:    Primary Wound Type: Ulceration   Description of Altered Skin Integrity:    Ankle-Brachial Index:    Pulses:    Removal Indication and Assessment:    Wound Outcome:    (Retired) Wound Length (cm):    (Retired) Wound Width (cm):    (Retired) Depth (cm):    Wound Description (Comments):    Removal Indications:    Wound Image   03/04/24 0911   Dressing Appearance Open to air;No dressing 03/04/24 0911   Drainage Amount None 03/04/24 0911   Appearance Eschar 03/04/24 0911   Black (%), Wound Tissue Color 100 % 03/04/24 0911   Periwound Area Dry 03/04/24 0911   Wound Length (cm) 2 cm 03/04/24 0911   Wound Width (cm) 3 cm 03/04/24 0911   Wound Depth (cm) 0 cm 03/04/24 0911   Wound Volume (cm^3) 0 cm^3 03/04/24 0911   Wound Surface Area (cm^2) 6 cm^2 03/04/24 0911   Care Cleansed with:;Sterile normal saline 03/04/24 0911   Dressing Applied 03/04/24 0911   Periwound Care Moisturizer applied 03/04/24 0911   Dressing Change Due 03/25/24 03/04/24 0911           Plan:          She has unstageable heel and third toe ischemic ulcers with intact eschar no sharp debridement continue conservative management with betadine paint daily no evidence of overlying soft tissue infection/cellulitis. Message sent to vascular surgery to assist with rescheduling missed appoitnment. Conitnue home heatlh dressing changes to right thigh which is improving return for  wound check in three weeks    Tissue pathology and/or culture taken     [] Yes      [x] No  Sharp debridement performed                   [] Yes       [x] No  Labs ordered     [] Yes       [x] No  Imaging ordered    [] Yes      [x] No    Orders Placed This Encounter   Procedures    Change dressing     For right lateral thigh wound:  Dressing change frequency once a week - next appointment 3/25/2024  Remove old dressing.  Cleanse or irrigate with: Normal Saline  Protect periwound with:  periwound: Cavilon  Primary dressing - adjust to fit: WOUND BASE: Endoform  Secondary dressing: Bordered Foam  For Left third toe and Left heel.  Patient is using betadine to treat wounds on foot, daily.    SUBSEQUENT HOME HEALTH ORDERS     Home Health Orders ( Patient was seen at [facility] on 03/04/2024. Next appointment at [facility] is 03/25/2024).    Wound Dressing Orders    For right lateral thigh wound:    Dressing change frequency twice a week   Remove old dressing.  Cleanse or irrigate with: Normal Saline  Protect periwound with:  periwound: Cavilon  Primary dressing - adjust to fit: WOUND BASE: Endoform  Secondary dressing: Bordered Foam    For Left third toe and Left heel.  Patient is using betadine to treat wounds on foot, daily. Please provide supplies.     Order Specific Question:   What Home Health Agency is the patient currently using?     Answer:   Other/External     Comments:   [facility]        Follow up in about 3 weeks (around 3/25/2024) for wound care.

## 2024-03-05 ENCOUNTER — HOSPITAL ENCOUNTER (EMERGENCY)
Facility: HOSPITAL | Age: 59
Discharge: HOME | End: 2024-03-05
Attending: STUDENT IN AN ORGANIZED HEALTH CARE EDUCATION/TRAINING PROGRAM
Payer: MEDICARE

## 2024-03-05 VITALS
SYSTOLIC BLOOD PRESSURE: 116 MMHG | BODY MASS INDEX: 16.39 KG/M2 | OXYGEN SATURATION: 98 % | DIASTOLIC BLOOD PRESSURE: 73 MMHG | HEIGHT: 64 IN | HEART RATE: 88 BPM | RESPIRATION RATE: 16 BRPM | TEMPERATURE: 98 F | WEIGHT: 96 LBS

## 2024-03-05 DIAGNOSIS — R04.0 EPISTAXIS: Primary | ICD-10-CM

## 2024-03-05 LAB
ALBUMIN SERPL BCP-MCNC: 2.4 G/DL (ref 3.5–5.2)
ALP SERPL-CCNC: 352 U/L (ref 55–135)
ALT SERPL W/O P-5'-P-CCNC: 16 U/L (ref 10–44)
ANION GAP SERPL CALC-SCNC: 12 MMOL/L (ref 8–16)
APTT PPP: 34.5 SEC (ref 21–32)
AST SERPL-CCNC: 31 U/L (ref 10–40)
BASOPHILS # BLD AUTO: 0.12 K/UL (ref 0–0.2)
BASOPHILS NFR BLD: 2.7 % (ref 0–1.9)
BILIRUB SERPL-MCNC: 1 MG/DL (ref 0.1–1)
BUN SERPL-MCNC: 26 MG/DL (ref 6–20)
CALCIUM SERPL-MCNC: 8.6 MG/DL (ref 8.7–10.5)
CHLORIDE SERPL-SCNC: 108 MMOL/L (ref 95–110)
CO2 SERPL-SCNC: 22 MMOL/L (ref 23–29)
CREAT SERPL-MCNC: 3.6 MG/DL (ref 0.5–1.4)
DIFFERENTIAL METHOD BLD: ABNORMAL
EOSINOPHIL # BLD AUTO: 0.2 K/UL (ref 0–0.5)
EOSINOPHIL NFR BLD: 3.6 % (ref 0–8)
ERYTHROCYTE [DISTWIDTH] IN BLOOD BY AUTOMATED COUNT: 20.1 % (ref 11.5–14.5)
EST. GFR  (NO RACE VARIABLE): 14 ML/MIN/1.73 M^2
GLUCOSE SERPL-MCNC: 99 MG/DL (ref 70–110)
HCT VFR BLD AUTO: 43.7 % (ref 37–48.5)
HGB BLD-MCNC: 12.8 G/DL (ref 12–16)
IMM GRANULOCYTES # BLD AUTO: 0.02 K/UL (ref 0–0.04)
IMM GRANULOCYTES NFR BLD AUTO: 0.4 % (ref 0–0.5)
INR PPP: 1.2 (ref 0.8–1.2)
LYMPHOCYTES # BLD AUTO: 1 K/UL (ref 1–4.8)
LYMPHOCYTES NFR BLD: 22.6 % (ref 18–48)
MCH RBC QN AUTO: 27.2 PG (ref 27–31)
MCHC RBC AUTO-ENTMCNC: 29.3 G/DL (ref 32–36)
MCV RBC AUTO: 93 FL (ref 82–98)
MONOCYTES # BLD AUTO: 0.6 K/UL (ref 0.3–1)
MONOCYTES NFR BLD: 14.1 % (ref 4–15)
NEUTROPHILS # BLD AUTO: 2.5 K/UL (ref 1.8–7.7)
NEUTROPHILS NFR BLD: 56.6 % (ref 38–73)
NRBC BLD-RTO: 0 /100 WBC
PLATELET # BLD AUTO: 180 K/UL (ref 150–450)
PMV BLD AUTO: 9.5 FL (ref 9.2–12.9)
POCT GLUCOSE: 90 MG/DL (ref 70–110)
POTASSIUM SERPL-SCNC: 3.6 MMOL/L (ref 3.5–5.1)
PROT SERPL-MCNC: 6.6 G/DL (ref 6–8.4)
PROTHROMBIN TIME: 12.8 SEC (ref 9–12.5)
RBC # BLD AUTO: 4.71 M/UL (ref 4–5.4)
SODIUM SERPL-SCNC: 142 MMOL/L (ref 136–145)
WBC # BLD AUTO: 4.46 K/UL (ref 3.9–12.7)

## 2024-03-05 PROCEDURE — 80053 COMPREHEN METABOLIC PANEL: CPT

## 2024-03-05 PROCEDURE — 99283 EMERGENCY DEPT VISIT LOW MDM: CPT | Mod: 25

## 2024-03-05 PROCEDURE — 25000003 PHARM REV CODE 250: Performed by: STUDENT IN AN ORGANIZED HEALTH CARE EDUCATION/TRAINING PROGRAM

## 2024-03-05 PROCEDURE — 85025 COMPLETE CBC W/AUTO DIFF WBC: CPT

## 2024-03-05 PROCEDURE — 85730 THROMBOPLASTIN TIME PARTIAL: CPT

## 2024-03-05 PROCEDURE — 85610 PROTHROMBIN TIME: CPT

## 2024-03-05 PROCEDURE — 82962 GLUCOSE BLOOD TEST: CPT

## 2024-03-05 PROCEDURE — 30901 CONTROL OF NOSEBLEED: CPT | Mod: RT

## 2024-03-05 RX ORDER — LIDOCAINE HYDROCHLORIDE AND EPINEPHRINE 10; 10 MG/ML; UG/ML
10 INJECTION, SOLUTION INFILTRATION; PERINEURAL ONCE
Status: COMPLETED | OUTPATIENT
Start: 2024-03-05 | End: 2024-03-05

## 2024-03-05 RX ORDER — OXYMETAZOLINE HCL 0.05 %
1 SPRAY, NON-AEROSOL (ML) NASAL
Status: COMPLETED | OUTPATIENT
Start: 2024-03-05 | End: 2024-03-05

## 2024-03-05 RX ADMIN — Medication 1 SPRAY: at 06:03

## 2024-03-05 RX ADMIN — LIDOCAINE HYDROCHLORIDE,EPINEPHRINE BITARTRATE 10 ML: 10; .01 INJECTION, SOLUTION INFILTRATION; PERINEURAL at 06:03

## 2024-03-06 ENCOUNTER — CARE AT HOME (OUTPATIENT)
Dept: HOME HEALTH SERVICES | Facility: CLINIC | Age: 59
End: 2024-03-06
Payer: MEDICARE

## 2024-03-06 DIAGNOSIS — Z71.89 COUNSELING REGARDING ADVANCE CARE PLANNING AND GOALS OF CARE: Primary | ICD-10-CM

## 2024-03-06 DIAGNOSIS — Z51.5 PALLIATIVE CARE ENCOUNTER: ICD-10-CM

## 2024-03-06 PROCEDURE — 99349 HOME/RES VST EST MOD MDM 40: CPT | Mod: GW,S$GLB,, | Performed by: NURSE PRACTITIONER

## 2024-03-06 NOTE — ED TRIAGE NOTES
Pt arrived to the ED due to epistaxis from right nostril that started last night and worsened today. Pt is on blood thinners.

## 2024-03-06 NOTE — DISCHARGE INSTRUCTIONS

## 2024-03-06 NOTE — ED PROVIDER NOTES
"Encounter Date: 3/5/2024       History     Chief Complaint   Patient presents with    Epistaxis     Pt reports epistaxis from right nare since last night. Pt reports the bleeding stopped "after a little while" last night but started up again a few hours ago, the bleeding continuous. Pt reports being a dialysis pt on T/Th/Sat and did complete her full treatment today. Pt reports hx of epistaxis. Pt is on 2.5 mg Eliquis daily.     58-year-old female with a history of end-stage renal disease on dialysis with completion of session today presents with right-sided epistaxis beginning last night.  She said it is stopped for some time but then recur today.  No shortness a breath or coughing.  She said history of these in the past.  No other bleeding dyscrasias however is on apixaban.  No nausea, vomiting or diarrhea.  No melena.      Review of patient's allergies indicates:   Allergen Reactions    Carvedilol Other (See Comments)     Nausea/vomiting    Allopurinol      Other reaction(s): abnormal transaminases     Past Medical History:   Diagnosis Date    Anemia     Anticoagulant long-term use     Atrial fibrillation     Bronchitis 03/01/2017    Cancer 2016    kidney cancer    CHF (congestive heart failure), NYHA class II, chronic, systolic     CMV (cytomegalovirus) antibody positive     CVA (cerebral vascular accident) 1/3/2024    Encounter for blood transfusion     ESRD (end stage renal disease)     Essential hypertension 09/23/2015    H/O herpes simplex type 2 infection     Herpes simplex type 1 antibody positive     History of kidney cancer     s/p left nephrectomy 1/2016    Hyperparathyroidism, unspecified     Hyperuricemia without signs of inflammatory arthritis and tophaceous disease     Kidney stones     LGSIL (low grade squamous intraepithelial dysplasia)     Myocardiopathy 07/21/2017    Prediabetes     Proteinuria     Renal disorder     Thyroid nodule     Urate nephropathy      Past Surgical History:   Procedure " Laterality Date    BREAST CYST EXCISION      COLONOSCOPY N/A 11/12/2015    COLONOSCOPY N/A 03/12/2021    Procedure: COLONOSCOPY;  Surgeon: Brendon Lanier MD;  Location[facility] ENDO;  Service: Endoscopy;  Laterality: N/A;  covid test 3/9, labs prior, prep instr mailed -ml    EXCISION OF ARTERIOVENOUS FISTULA Left 09/27/2023    Procedure: EXCISION, AV FISTULA;  Surgeon: FARIDEH Muñoz III, MD;  [facility];  Service: Vascular;  Laterality: Left;  LUE AV graft excision    INSERTION OF BIVENTRICULAR IMPLANTABLE CARDIOVERTER-DEFIBRILLATOR (ICD)  04/2021    INSERTION OF TUNNELED CENTRAL VENOUS CATHETER (CVC) WITH SUBCUTANEOUS PORT N/A 01/25/2023    Procedure: INSERTION, DUAL LUMEN CATHETER WITH PORT, WITH IMAGING GUIDANCE;  Surgeon: FARIDEH Muñoz III, MD;  [facility];  Service: Peripheral Vascular;  Laterality: N/A;  possinble permcath placment    NEPHRECTOMY-LAPAROSCOPIC Left 01/12/2016    PERCUTANEOUS TRANSLUMINAL ANGIOPLASTY OF ARTERIOVENOUS FISTULA Left 04/19/2023    Procedure: PTA, AV FISTULA;  Surgeon: FARIDEH Muñoz III, MD;  Location: [facility] CATH LAB;  Service: Peripheral Vascular;  Laterality: Left;    PERITONEAL CATHETER INSERTION      Permacath insertion  01/12/2017    PLACEMENT OF ARTERIOVENOUS GRAFT  12/28/2022    Procedure: INSERTION, GRAFT, ARTERIOVENOUS;  Surgeon: FARIDEH Muñoz III, MD;  [facility];  Service: Peripheral Vascular;;    REMOVAL, GRAFT, ARTERIOVENOUS, UPPER EXTREMITY Left 01/25/2023    Procedure: REMOVAL, GRAFT, ARTERIOVENOUS, UPPER EXTREMITY;  Surgeon: FARIDEH Muñoz III, MD;  Location: [facility] OR [facility] FLR;  Service: Peripheral Vascular;  Laterality: Left;    REVISION OF ARTERIOVENOUS FISTULA Left 05/01/2023    Procedure: REVISION, AV FISTULA;  Surgeon: FARIDEH Muñoz III, MD;  Location: [facility] OR [facility] FLR;  Service: Peripheral Vascular;  Laterality: Left;  LUE AVG revision    REVISION OF PROCEDURE INVOLVING ARTERIOVENOUS GRAFT Left 12/28/2022     Procedure: REVISION, PROCEDURE INVOLVING ARTERIOVENOUS GRAFT;  Surgeon: FARIDEH Muñoz III, MD;  [facility];  Service: Peripheral Vascular;  Laterality: Left;    REVISION OF PROCEDURE INVOLVING ARTERIOVENOUS GRAFT Left 07/21/2023    Procedure: LEFT ARM ARTERIOVENOUS GRAFT EXCISION  AND LIGATION;  Surgeon: Obi Werner MD;  Location: [facility] OR;  Service: Vascular;  Laterality: Left;  Left AVG excision and revision with interposition    SALPINGOOPHORECTOMY Right 2016    KJB---DAVINCI    TONSILLECTOMY      TUBAL LIGATION       Family History   Problem Relation Age of Onset    Hypertension Mother     Diabetes Father     Kidney disease Father     No Known Problems Sister     Heart disease Sister     No Known Problems Sister     No Known Problems Brother     No Known Problems Brother     Cataracts Maternal Aunt     No Known Problems Maternal Uncle     No Known Problems Paternal Aunt     No Known Problems Paternal Uncle     No Known Problems Maternal Grandmother     Diabetes Maternal Grandfather     No Known Problems Paternal Grandmother     No Known Problems Paternal Grandfather     No Known Problems Son     No Known Problems Son     No Known Problems Other     Breast cancer Neg Hx     Colon cancer Neg Hx     Cancer Neg Hx     Stroke Neg Hx     Amblyopia Neg Hx     Blindness Neg Hx     Glaucoma Neg Hx     Macular degeneration Neg Hx     Retinal detachment Neg Hx     Strabismus Neg Hx     Thyroid disease Neg Hx      Social History     Tobacco Use    Smoking status: Never     Passive exposure: Never    Smokeless tobacco: Never   Substance Use Topics    Alcohol use: No    Drug use: Yes     Types: Hydrocodone     Review of Systems    Physical Exam     Initial Vitals [03/05/24 1746]   BP Pulse Resp Temp SpO2   118/72 96 18 98.7 °F (37.1 °C) 97 %      MAP       --         Physical Exam    Nursing note and vitals reviewed.  Constitutional: She appears well-developed and well-nourished.   Patient  currently in the hallway.  Active venous ooze from the right nares   HENT:   Head: Normocephalic and atraumatic.   Eyes: EOM are normal. Pupils are equal, round, and reactive to light.   Neck: Neck supple. No JVD present.   Normal range of motion.  Cardiovascular:  Normal rate and regular rhythm.           Pulmonary/Chest: Breath sounds normal. No stridor. No respiratory distress.   Abdominal: Abdomen is soft. There is no abdominal tenderness.   Musculoskeletal:         General: No tenderness or edema. Normal range of motion.      Cervical back: Normal range of motion and neck supple.     Neurological: She is alert and oriented to person, place, and time. GCS score is 15. GCS eye subscore is 4. GCS verbal subscore is 5. GCS motor subscore is 6.   Skin: Skin is warm and dry. Capillary refill takes less than 2 seconds.   Psychiatric: She has a normal mood and affect. Thought content normal.         ED Course   Procedures  Labs Reviewed   CBC W/ AUTO DIFFERENTIAL - Abnormal; Notable for the following components:       Result Value    MCHC 29.3 (*)     RDW 20.1 (*)     Basophil % 2.7 (*)     All other components within normal limits   COMPREHENSIVE METABOLIC PANEL - Abnormal; Notable for the following components:    CO2 22 (*)     BUN 26 (*)     Creatinine 3.6 (*)     Calcium 8.6 (*)     Albumin 2.4 (*)     Alkaline Phosphatase 352 (*)     eGFR 14 (*)     All other components within normal limits   PROTIME-INR - Abnormal; Notable for the following components:    Prothrombin Time 12.8 (*)     All other components within normal limits   APTT - Abnormal; Notable for the following components:    aPTT 34.5 (*)     All other components within normal limits   POCT GLUCOSE   POCT GLUCOSE MONITORING CONTINUOUS          Imaging Results    None          Medications   LIDOcaine-EPINEPHrine 1%-1:100,000 injection 10 mL (10 mLs Intradermal Given by Provider 3/5/24 0744)   oxymetazoline 0.05 % nasal spray 1 spray (1 spray Each Nostril  Given by Provider 3/5/24 1812)     Medical Decision Making  Hemodynamically stable. Afebrile. Phonating and protecting the airway spontaneously. No clinical evidence for cardiovascular instability or impending airway compromise. Examination as above. Additional historians include family at bedside. Prior medical records reviewed.  Recent hematology/oncology note reviewed, patient with metastatic renal cancer, end-stage renal disease on blood thinners. Current co-morbidities considered that will impact clinical decision making include as above.    Plan:  Rhino rocket placed to the right nares with Afrin and lidocaine with epinephrine.  Will obtain basic labs.  Will reassess for hemostatic improvement while in the emergency department.      Risk  OTC drugs.  Prescription drug management.               ED Course as of 03/05/24 1955   Tue Mar 05, 2024   1953 Labs reviewed.  CBC negative.  CMP with elements of ESRD.  No coagulopathy pathic.  On reassessment, she is resting comfortably.  Rhino rocket removed with improvement of bleeding, hemostasis achieved.  Will discharge home with strict return precautions and to follow up with her primary care physician tomorrow. [BG]      ED Course User Index  [BG] Anam Alexander MD                           Clinical Impression:  Final diagnoses:  [R04.0] Epistaxis (Primary)          ED Disposition Condition    Discharge Stable          ED Prescriptions    None       Follow-up Information       Follow up With Specialties Details Why Contact Info    Sowmya Mccain MD Internal Medicine, Pediatrics Go to  As needed [address] [facility]  [place] [address]  [phone]               Anam Alexander MD  03/05/24 1955

## 2024-03-07 VITALS
HEART RATE: 95 BPM | RESPIRATION RATE: 18 BRPM | SYSTOLIC BLOOD PRESSURE: 118 MMHG | TEMPERATURE: 98 F | OXYGEN SATURATION: 91 % | DIASTOLIC BLOOD PRESSURE: 72 MMHG

## 2024-03-07 NOTE — PROGRESS NOTES
[facility]  Medical Chronic Care Home Visit/Palliative Care    Encounter Provider: CHRISTINA Smith NP  PCP: Sowmya Mccain MD  Consult Requested By: No ref. provider found    HISTORY OF PRESENT ILLNESS      Patient ID: Eva Bloom is a 58 y.o. female is being seen in the home due to physical debility that presents a taxing effort to leave the home, to mitigate high risk of hospital readmission and/or due to the limited availability of reliable or safe options for transportation to the point of access to the provider. Prior to treatment on this visit the chart was reviewed and patient verbal consent was obtained.    Social History:    Ms. Bloom has been  to her  since 1988 for 36 years, and she has 2 sons. Youngest son lives in [place]. She has 5 grandchildren, and no great   nor patient served in the [place] . Ms. Velasquez worked as a  at [employer] for 23 years, until she became sick. She had kidney removed in 2016. Ms. Velasquez has 5 (siblings) 3 sisters and 2 brothers and they are all living. Patient's father has 4 additional daughters.     Chronic medical conditions synopsis:    Ms. Bloom is being seen today in conjunction with patient's PCP Dr. Mccain.    Today, Ms. Bloom is seen@home to continue palliative care services. Ms. Bloom is sitting on the sofa in the her living room, and her  Otis is present. She is AA&Ox3 person, place. Patient lives at home with her , who assists in her care. She ambulates with walker but requires assistance with all ADLs.  reports she fell 2 weeks and has not had any falls since. Due to fall, she fractured right arm ulnar. No c/o pain at present. She goes to dialysis on T/TH/Saturday [facility]. Dialysis access to left chest wall.  Appetite has been fair. Weight fluctuates.     -patient appears thin and frail;BLE weakness        Ms. Bloom requires a hospital bed due to her requiring positioning of  the body in ways not feasible with an ordinary bed to alleviate pain and she has limited mobility and cannot independently make changes in body position without the use of the bed.  The positioning of the body cannot be sufficiently resolved by the use of pillows and wedges.       requires the head of the bed to be elevated more than 30 degrees most of the time due to having a CVA, CHF, high risk for aspiration, and having physical debility & ESRD on dialysis.  The positioning of the body cannot be sufficiently resolved by the use of pillows and wedges. Recently had fall with fx to right ulnar.     Note:    Discussed Hospice with patient's  Mr. Berg. States he had her evaluated but hospice company does not cover dialysis so he does not want hospice at this time. He wants his wife to continue going to dialysis.   I have discussed the cessation of our palliative care program in the home and have offered [facility] referral to be placed to see patient, however, patient and family feel it is unnecessary d/t possible restart of cancer treatment. No additional needs at this this time. All of my information was given to the patient and family if any questions or concerns arise.      DECISION MAKING TODAY       Assessment & Plan:    1. Palliative care encounter  -Discussed Palliative care and Hospice  -Patient/family want to continue with Palliative care services    2. Cerebrovascular accident (CVA), unspecified mechanism  -     HOSPITAL BED FOR HOME USE  -Turn Q2 hours  -f/u with neurology    3. Chronic combined systolic and diastolic congestive heart failure  -continue Metoprolol  -monitor weight and call PCP if weight gain >3 pounds      4. Metastatic renal cell carcinoma to lung, unspecified laterality  -f/u with oncologist  -discussed hospice as an toption as well as continued PC; refused by patient and  at this time    5. Counseling regarding advance care planning and goals of  care  --Discussed goals of care with  and would like his wife to get stronger and wants her to continue with dialysis    6. ESRD (end stage renal disease) on dialysis  Overview:  - Tuesday, Thursday, and Saturday  - Chelsie Hart, nephrology      ENVIRONMENT OF CARE      Family and/or Caregiver present at visit?  Yes  Name of Caregiver: spouse Otis Bloom  History provided by: caregiver    Advance Care Planning   Advanced Care Planning Status:DNR (per )  Patient has had an ACP conversation  Living Will: No  Power of : No  LaPOST: No    Does Caregiver have HCPoA: Yes (not in writing)  Changes today: none       Review of Symptoms      Symptom Assessment (ESAS 0-10 Scale)  Pain:  0  Dyspnea:  0  Anxiety:  0  Nausea:  0  Depression:  0  Anorexia:  0  Fatigue:  0  Insomnia:  0  Restlessness:  0  Agitation:  0     CAM / Delirium:  Negative  Constipation:  Negative  Diarrhea:  Positive    Constipation:  No constipation    Bowel Management Plan (BMP):  No      Comments:  LBM 1/22 loose    Performance Status:  50    Karnofsky Performance Scale:  50%    Living Arrangements:  Lives with spouse    Psychosocial/Cultural:   See Palliative Psychosocial Note: Yes  **Primary  to Follow**  Palliative Care  Consult: No    Spiritual:  F - Justa and Belief:  Yazidism  I - Importance:  Highly  C - Community:  [facility]  A - Address in Care:  No spiritual needs identified    Advance Care Planning   Advance Directives:   Living Will: No        Oral Declaration: No    LaPOST: No (reviewed form with  and pateint; left at house for  to complete)    Do Not Resuscitate Status: No    Medical Power of : No        Oral Declaration: No    Agent's Name:  Otis Bloom ()   Agent's Contact Number:  [phone] cell    Decision Making:  Patient answered questions and Family answered questions  Goals of Care: What is most important right now is to focus on  spending time at home, avoiding the hospital, remaining as independent as possible, quality of life, even if it means sacrificing a little time, improvement in condition but with limits to invasive therapies, comfort and QOL . Accordingly, we have decided that the best plan to meet the patient's goals includes continuing with treatment.       Impression upon entering the home:  Physical Dwelling: single family home   Appearance of home environment: cleaniness: clean, walking pathways: clear, lighting: adequate, and home structure: sound structure  Functional Status: moderate assistance  Mobility: ambulatory with device  Nutritional access:  appetite fluctuates. Fair; drinking boost 1 a day  Home Health: Yes, HH Agency [facility] with nurse, PT,     DME/Supplies: wheelchair, bedside commode, shower chair, and metal walker      Diagnostic tests reviewed/disposition: I have reviewed all completed as well as pending diagnostic tests at the time of discharge.  Disease/illness education: Cancer and ESRD on dialysis  Establishment or re-establishment of referral orders for community resources: No other necessary community resources.   Discussion with other health care providers: No discussion with other health care providers necessary.   Does patient have a PCP at [facility]? Yes   Repatriation plan with PCP? Care at Home reason: mobility   Does patient have an ostomy (ileostomy, colostomy, suprapubic catheter, nephrostomy tube, tracheostomy, PEG tube, pleurex catheter, cholecystostomy, etc)? No  Were BPAs reviewed? Yes    Social History     Socioeconomic History    Marital status:     Number of children: 2   Occupational History    Occupation: [employer]     Employer: [id number]   Tobacco Use    Smoking status: Never     Passive exposure: Never    Smokeless tobacco: Never   Substance and Sexual Activity    Alcohol use: No    Drug use: Yes     Types: Hydrocodone    Sexual activity: Not Currently     Social Determinants of  Health     Financial Resource Strain: Low Risk  (9/28/2023)    Overall Financial Resource Strain (CARDIA)     Difficulty of Paying Living Expenses: Not hard at all   Food Insecurity: No Food Insecurity (9/28/2023)    Hunger Vital Sign     Worried About Running Out of Food in the Last Year: Never true     Ran Out of Food in the Last Year: Never true   Transportation Needs: No Transportation Needs (9/28/2023)    PRAPARE - Transportation     Lack of Transportation (Medical): No     Lack of Transportation (Non-Medical): No   Physical Activity: Inactive (9/28/2023)    Exercise Vital Sign     Days of Exercise per Week: 0 days     Minutes of Exercise per Session: 0 min   Stress: No Stress Concern Present (9/28/2023)    [employer] [employer] [employer] - Occupational Stress Questionnaire     Feeling of Stress : Not at all   Social Connections: Moderately Integrated (9/28/2023)    Social Connection and Isolation Panel [NHANES]     Frequency of Communication with Friends and Family: More than three times a week     Frequency of Social Gatherings with Friends and Family: More than three times a week     Attends Islam Services: More than 4 times per year     Active Member of Clubs or Organizations: No     Attends Club or Organization Meetings: Never     Marital Status:    Housing Stability: Low Risk  (9/28/2023)    Housing Stability Vital Sign     Unable to Pay for Housing in the Last Year: No     Number of Places Lived in the Last Year: 1     Unstable Housing in the Last Year: No         OBJECTIVE:     Vital Signs:  Vitals:    03/06/24 1300   BP: 118/72   Pulse: 95   Resp: 18   Temp: 98.1 °F (36.7 °C)       Review of Systems   Constitutional:  Positive for appetite change (not eating as much) and fatigue. Negative for activity change.   HENT:  Positive for rhinorrhea and sinus pain. Negative for trouble swallowing.    Respiratory:  Positive for cough (np cough at times). Negative for choking, chest tightness  and shortness of breath.    Cardiovascular:  Negative for chest pain and leg swelling.   Gastrointestinal:  Negative for abdominal pain, constipation and diarrhea.   Genitourinary:  Positive for dysuria.        Does not make urine   Musculoskeletal:  Positive for gait problem. Negative for back pain and neck pain.        Fx right arm with splint/ace wrap   Skin:  Negative for rash and wound.   Neurological:  Positive for weakness. Negative for dizziness, tremors and seizures.        Right arm weakness had AVF  New access chest   Psychiatric/Behavioral:  Positive for confusion (at night), hallucinations and sleep disturbance.        Physical Exam  Vitals and nursing note reviewed. Exam conducted with a chaperone present.   Constitutional:       Appearance: Normal appearance.   HENT:      Head: Normocephalic and atraumatic.      Right Ear: There is no impacted cerumen.      Left Ear: There is no impacted cerumen.      Nose: Rhinorrhea present.      Mouth/Throat:      Mouth: Mucous membranes are moist.   Eyes:      Extraocular Movements: Extraocular movements intact.      Conjunctiva/sclera: Conjunctivae normal.   Cardiovascular:      Rate and Rhythm: Normal rate and regular rhythm.      Pulses: Normal pulses.      Heart sounds: Murmur (grade III/VI) heard.   Pulmonary:      Effort: Pulmonary effort is normal.      Breath sounds: Normal breath sounds.      Comments: BBS CTA  Abdominal:      General: Bowel sounds are normal.      Palpations: Abdomen is soft.   Genitourinary:     Comments: Anuric   Musculoskeletal:         General: Swelling present. Normal range of motion.      Cervical back: Normal range of motion and neck supple.      Right lower leg: No edema.      Left lower leg: No edema.      Comments: Limited ROM to right arm/right wrist; swelling to fingers   Skin:     General: Skin is warm and dry.      Capillary Refill: Capillary refill takes less than 2 seconds.      Coloration: Skin is pale.      Comments:  Left CW Jan catheter   Neurological:      Mental Status: She is alert.      Motor: Weakness present.      Gait: Gait abnormal.      Comments: AA&Ox1-2 person, place   Psychiatric:         Mood and Affect: Mood normal.         Behavior: Behavior normal.       INSTRUCTIONS FOR PATIENT:     Scheduled Follow-up, Appts Reviewed with Modifications if Needed: Yes  Future Appointments   Date Time Provider Department Center   3/15/2024  8:15 AM Basim Farooq DPM [facility] POD [place]   3/18/2024  9:00 AM Ben Rivera MD [facility] [id number] [place] [facility]   3/20/2024  8:45 AM Ana Yeh MD [facility] ORTHO [place] [facility]   3/25/2024  8:00 AM Mode Jones MD [facility] WOUND [place] [facility]   4/12/2024  8:30 AM Corby Casey MD [facility] VAS [facility]   5/1/2024  8:30 AM LABELLY [facility] LAB [place]   5/8/2024  8:20 AM Sowmya Mccain MD [facility] FAM MED [place]         Current Outpatient Medications:     acetaminophen (TYLENOL) 500 MG tablet, Take 500 mg by mouth every 6 (six) hours as needed for Pain., Disp: , Rfl:     acetaminophen (TYLENOL) 500 MG tablet, Take 1 tablet (500 mg total) by mouth every 4 (four) hours as needed for Pain or Temperature greater than (100.5 or greater)., Disp: 30 tablet, Rfl: 0    albuterol-ipratropium (DUO-NEB) 2.5 mg-0.5 mg/3 mL nebulizer solution, Take by nebulization., Disp: , Rfl:     apixaban (ELIQUIS) 2.5 mg Tab, Take 1 tablet (2.5 mg total) by mouth 2 (two) times daily., Disp: 60 tablet, Rfl: 11    aspirin (ECOTRIN) 81 MG EC tablet, Take 1 tablet (81 mg total) by mouth once daily., Disp: 90 tablet, Rfl: 3    atorvastatin (LIPITOR) 40 MG tablet, Take 1 tablet (40 mg total) by mouth once daily., Disp: 90 tablet, Rfl: 0    bisacodyL (DULCOLAX) 10 mg Supp, Place 10 mg rectally., Disp: , Rfl:     cabozantinib (CABOMETYX) 60 mg Tab, TAKE ONE TABLET BY MOUTH ONCE DAILY AT THE SAME TIME ON AN EMPTY STOMACH AT LEAST 1 HOUR BEFORE OR 2 HOURS AFTER EATING. AVOID GRAPEFRUIT PRODUCTS, Disp: 30  tablet, Rfl: 4    cyclobenzaprine (FLEXERIL) 5 MG tablet, TAKE 1 TABLET BY MOUTH DAILY AS NEEDED FOR MUSCLE SPASMS., Disp: 30 tablet, Rfl: 1    epoetin david-epbx (RETACRIT INJ), Epoetin david - epbx (Retacrit), Disp: , Rfl:     hyoscyamine (LEVSIN/SL) 0.125 mg Subl, Place under the tongue., Disp: , Rfl:     lanthanum (FOSRENOL) 1000 MG chewable tablet, Take 1 tablet by mouth., Disp: , Rfl:     levothyroxine (SYNTHROID) 75 MCG tablet, TAKE 1 TABLET BY MOUTH EVERY DAY, Disp: 90 tablet, Rfl: 3    lidocaine-prilocaine (EMLA) cream, APPLY ATLEAST 30 MINUTES BEFORE TREATMENT 3 TIMES A WEEK, Disp: 30 g, Rfl: 11    LORAZEPAM INTENSOL 2 mg/mL Conc, Take by mouth., Disp: , Rfl:     metoprolol succinate (TOPROL-XL) 25 MG 24 hr tablet, Take 1 tablet (25 mg total) by mouth once daily., Disp: 30 tablet, Rfl: 11    mirtazapine (REMERON) 7.5 MG Tab, Take 1 tablet (7.5 mg total) by mouth every evening., Disp: 30 tablet, Rfl: 11    mv,Ca,min-folic acid-vit K1 (ONE-A-DAY WOMEN'S 50 PLUS) 400-20 mcg Tab, Take 1 tablet by mouth once daily., Disp: , Rfl:     naloxone (NARCAN) 1 mg/mL injection, 2 mg (1 mg per nostril) by Nasal route as needed for opioid overdose; may repeat in 3 to 5 minutes if not effective. Call 911, Disp: 2 mL, Rfl: 11    nystatin (MYCOSTATIN) cream, SMARTSIG:sparingly Topical 2-4 Times Daily PRN, Disp: , Rfl:     ondansetron (ZOFRAN-ODT) 4 MG TbDL, Take by mouth., Disp: , Rfl:     oxyCODONE (ROXICODONE) 5 MG immediate release tablet, Take 1 tablet (5 mg total) by mouth every 6 (six) hours as needed for Pain., Disp: 11 tablet, Rfl: 0    pantoprazole (PROTONIX) 40 MG tablet, Take 1 tablet (40 mg total) by mouth 2 (two) times daily., Disp: 60 tablet, Rfl: 11    traMADoL (ULTRAM) 50 mg tablet, Take 1 tablet (50 mg total) by mouth every 12 (twelve) hours as needed for Pain., Disp: 30 each, Rfl: 1  No current facility-administered medications for this visit.    Facility-Administered Medications Ordered in Other Visits:      0.9%  NaCl infusion, , Intravenous, Continuous, Soni Bhatti MD, New Bag at 07/21/23 1116    Medication Reconciliation:  Were medications changed during this appointment? No  Were medications in the home? Yes  Is the patient taking the medications as directed? Yes  Does the patient/caregiver understand the medications and changes? Yes  Does updated med list accurately reflects meds patient is currently taking? Yes    Total clinical care time was 60min. Reviewed Chart and PCP's notes then the following issues were discussed:  PMHx, PSHx, social history, reviewed medications, CVA, chronic combined systolic and diastolic congestive heart failure, metastatic renal cell carcinoma to lung, ESRD on dialysis. Discussed hospice and benefits. Discussed hospice philosophy      RAÚL Ann-ACNP [facility] [place]

## 2024-03-08 ENCOUNTER — TELEPHONE (OUTPATIENT)
Dept: VASCULAR SURGERY | Facility: CLINIC | Age: 59
End: 2024-03-08
Payer: MEDICARE

## 2024-03-15 ENCOUNTER — TELEPHONE (OUTPATIENT)
Dept: HEMATOLOGY/ONCOLOGY | Facility: CLINIC | Age: 59
End: 2024-03-15
Payer: MEDICARE

## 2024-03-18 ENCOUNTER — OFFICE VISIT (OUTPATIENT)
Dept: HEMATOLOGY/ONCOLOGY | Facility: CLINIC | Age: 59
End: 2024-03-18
Payer: MEDICARE

## 2024-03-18 ENCOUNTER — OFFICE VISIT (OUTPATIENT)
Dept: PODIATRY | Facility: CLINIC | Age: 59
End: 2024-03-18
Payer: MEDICARE

## 2024-03-18 VITALS
RESPIRATION RATE: 14 BRPM | WEIGHT: 93 LBS | SYSTOLIC BLOOD PRESSURE: 127 MMHG | OXYGEN SATURATION: 100 % | BODY MASS INDEX: 15.96 KG/M2 | HEART RATE: 92 BPM | TEMPERATURE: 98 F | DIASTOLIC BLOOD PRESSURE: 87 MMHG

## 2024-03-18 VITALS
HEIGHT: 64 IN | DIASTOLIC BLOOD PRESSURE: 76 MMHG | SYSTOLIC BLOOD PRESSURE: 124 MMHG | WEIGHT: 93 LBS | BODY MASS INDEX: 15.88 KG/M2 | HEART RATE: 88 BPM

## 2024-03-18 DIAGNOSIS — L97.529 ISCHEMIC TOE ULCER, LEFT, WITH UNSPECIFIED SEVERITY: Primary | ICD-10-CM

## 2024-03-18 DIAGNOSIS — C64.2 METASTATIC RENAL CELL CARCINOMA, LEFT: ICD-10-CM

## 2024-03-18 DIAGNOSIS — I73.9 PAD (PERIPHERAL ARTERY DISEASE): ICD-10-CM

## 2024-03-18 DIAGNOSIS — C79.89 METASTATIC RENAL CELL CARCINOMA TO INTRA-ABDOMINAL SITE: Primary | ICD-10-CM

## 2024-03-18 DIAGNOSIS — N18.6 ESRD (END STAGE RENAL DISEASE) ON DIALYSIS: ICD-10-CM

## 2024-03-18 DIAGNOSIS — Z90.5 S/P NEPHRECTOMY: ICD-10-CM

## 2024-03-18 DIAGNOSIS — L97.429 HEEL ULCER, LEFT, WITH UNSPECIFIED SEVERITY: ICD-10-CM

## 2024-03-18 DIAGNOSIS — I51.89 COMBINED SYSTOLIC AND DIASTOLIC CARDIAC DYSFUNCTION: ICD-10-CM

## 2024-03-18 DIAGNOSIS — Z99.2 ESRD (END STAGE RENAL DISEASE) ON DIALYSIS: ICD-10-CM

## 2024-03-18 DIAGNOSIS — C64.9 METASTATIC RENAL CELL CARCINOMA TO INTRA-ABDOMINAL SITE: Primary | ICD-10-CM

## 2024-03-18 PROCEDURE — 99215 OFFICE O/P EST HI 40 MIN: CPT | Mod: PBBFAC,27 | Performed by: INTERNAL MEDICINE

## 2024-03-18 PROCEDURE — 99215 OFFICE O/P EST HI 40 MIN: CPT | Mod: S$PBB,GV,, | Performed by: INTERNAL MEDICINE

## 2024-03-18 PROCEDURE — 99999 PR PBB SHADOW E&M-EST. PATIENT-LVL V: CPT | Mod: PBBFAC,,, | Performed by: INTERNAL MEDICINE

## 2024-03-18 PROCEDURE — 99999 PR PBB SHADOW E&M-EST. PATIENT-LVL IV: CPT | Mod: PBBFAC,,, | Performed by: PODIATRIST

## 2024-03-18 PROCEDURE — 99214 OFFICE O/P EST MOD 30 MIN: CPT | Mod: PBBFAC,27,PO | Performed by: PODIATRIST

## 2024-03-18 PROCEDURE — 99213 OFFICE O/P EST LOW 20 MIN: CPT | Mod: S$PBB,,, | Performed by: PODIATRIST

## 2024-03-18 NOTE — PROGRESS NOTES
Subjective:      Patient ID: Eva Bloom is a 58 y.o. female.    Chief Complaint: Wound Care    Wounds of toe 3 left and left heel.  Stable, dry eschar of each.  Follows with wound care weekly.  Relates pain at these sites managed with tylenol.  No dressing presently.    Review of Systems   Constitutional: Negative for chills, diaphoresis, fever, malaise/fatigue and night sweats.   Cardiovascular:  Negative for claudication, cyanosis, leg swelling and syncope.   Skin:  Positive for poor wound healing and suspicious lesions. Negative for color change, dry skin, nail changes, rash and unusual hair distribution.   Musculoskeletal:  Negative for falls, joint pain, joint swelling, muscle cramps, muscle weakness and stiffness.   Gastrointestinal:  Negative for constipation, diarrhea, nausea and vomiting.   Neurological:  Positive for paresthesias and sensory change. Negative for brief paralysis, disturbances in coordination, focal weakness, numbness and tremors.         Objective:      Physical Exam  Constitutional:       General: She is not in acute distress.     Appearance: She is well-developed. She is not diaphoretic.      Comments: Oriented to time, place, and person.   Cardiovascular:      Pulses:           Dorsalis pedis pulses are 1+ on the right side and 0 on the left side.        Posterior tibial pulses are 1+ on the right side and 0 on the left side.      Comments: Capillary fill time 5-8 seconds.  All toes warm to touch.      Negative lower extremity edema bilateral.    Negative elevational pallor and dependent rubor bilateral.    Musculoskeletal:      Comments: Normal angle, base, station of gait. Decreased stride length, early heel off, moderately propulsive toe off bilateral.    All ten toes without have some cyanosis, atrophic appearance, and signs of ischemia.      No MS pain to palpation bilateral lower extremities.      Range of motion, stability, muscle strength, and muscle tone are age and health  appropriate normal bilateral feet and legs.       Lymphadenopathy:      Comments: Negative lymphadenopathy bilateral popliteal fossa and tarsal tunnel.     Skin:     General: Skin is warm and dry.      Coloration: Skin is not pale.      Findings: No abrasion, bruising, burn, ecchymosis, erythema, laceration, lesion, petechiae or rash.      Nails: There is no clubbing.      Comments: Hard dark eschar dorsal left 3rd dipj and plantar posterior heel left  without ulceration, drainage, pus, tracking, fluctuance, malodor, or cardinal signs infection.      Otherwise, Skin thin, atrophic, with decreased density and distribution of pedal hair bilateral, but without hyperpigmentation, daniele discoloration,  ulcers, masses, nodules or cords palpated bilateral feet and legs.      Toenails 1st, 2nd, 3rd, 4th, 5th  bilateral are hypertrophic thickened 2-3 mm, dystrophic, discolored tanish brown with tan, gray crumbly subungual debris.  Tender to distal nail plate pressure, without periungual skin abnormality of each.     Neurological:      Mental Status: She is alert and oriented to person, place, and time. She is not disoriented.      Sensory: Sensory deficit present.      Motor: No tremor, atrophy or abnormal muscle tone.      Comments: Decreased/absent vibratory sensation bilateral feet to 128Hz tuning fork.    Paresthesias, and burning bilateral feet with no clearly identified trigger or source.     Psychiatric:         Behavior: Behavior is cooperative.           Assessment:       Encounter Diagnoses   Name Primary?    Ischemic toe ulcer, left, with unspecified severity Yes    Heel ulcer, left, with unspecified severity     PAD (peripheral artery disease)          Plan:       Eva was seen today for wound care.    Diagnoses and all orders for this visit:    Ischemic toe ulcer, left, with unspecified severity    Heel ulcer, left, with unspecified severity    PAD (peripheral artery disease)      I counseled the patient on  her conditions, their implications and medical management.        Continue daily betadine swabs both wound sites.    Inspect feet multiple times daily for signs of occurrence/recurrence ulceration.    Soft accommodative shoe gear.    Continue all regular follow up with wound care.    Prn here.          No follow-ups on file.

## 2024-03-18 NOTE — PROGRESS NOTES
"  Subjective:       Patient ID: Eva Bloom is a 58 y.o. female.    Chief Complaint: RCC    HPI     58 y.o.female to clinic for follow up for metastatic RCC. Patient was previously on hospice but now not enrolled as she wants to continue dialysis. She is feeling stronger, remains off cabozantinib. Pt still is on ESRD with HD every TTS. Has HH coming out with PT services.    ECOG 3. She is accompanied with her . She presents in a wheelchair.      Oncologic History (From Chart and Patient):  Eva Bloom is a 58 y.o.female with ESRD on HD who was found to have two L renal masses. She underwent L lap nephrectomy on 1/12/16. 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 Margins were negative.  Shehealed from surgery well, but then developed a large ovarian mass.  This was removed by gyn along with multiple sites of metastatic disease in the pelvis.  Path was c/w recurrence of RCC.     11/20/16 Pelvic ultrasound reveals "Large heterogeneous cystic and solid mass which appears to arise from the right ovary with worrisome imaging features for malignancy.  Differential diagnosis includes malignant tumors such as serous or mucinous cystadenocarcinoma.  It is important to note that the patient is at risk for ovarian torsion due to the size of the mass.Multiple uterine fibroids are identified with the largest in the uterine fundus."    11/22/16 pathology reveals "FINAL PATHOLOGIC DIAGNOSIS-RIGHT OVARY AND FALLOPIAN TUBE, RIGHT SALPINGO-OOPHORECTOMY:  -Positive for malignancy, high grade carcinoma morphologically and immunohistochemically consistent with metastasis from the patient's known renal cell carcinoma"    Review of Systems   Constitutional:  Positive for activity change, appetite change, fatigue and unexpected weight change. Negative for chills and fever.   HENT:  Negative for " congestion, hearing loss, mouth sores, postnasal drip, sore throat, tinnitus and voice change.    Eyes:  Negative for pain and visual disturbance.   Respiratory:  Negative for cough, shortness of breath and wheezing.    Cardiovascular:  Negative for chest pain, palpitations and leg swelling.   Gastrointestinal:  Negative for abdominal pain, constipation, diarrhea, nausea and vomiting.   Endocrine: Negative for cold intolerance and heat intolerance.   Genitourinary:  Negative for difficulty urinating, dyspareunia, dysuria, frequency, menstrual problem, urgency, vaginal bleeding, vaginal discharge and vaginal pain.   Musculoskeletal:  Negative for arthralgias and myalgias.        LE ulcerations and pain    Skin:  Negative for color change, rash and wound.   Allergic/Immunologic: Negative for environmental allergies and food allergies.   Neurological:  Negative for weakness, numbness and headaches.   Hematological:  Negative for adenopathy. Does not bruise/bleed easily.   Psychiatric/Behavioral:  Negative for agitation, confusion, hallucinations and sleep disturbance. The patient is not nervous/anxious.    All other systems reviewed and are negative.      Allergies:  Review of patient's allergies indicates:   Allergen Reactions    Allopurinol      Other reaction(s): abnormal transaminases       Medications:  Current Outpatient Medications   Medication Sig Dispense Refill    acetaminophen (TYLENOL) 500 MG tablet Take 500 mg by mouth every 6 (six) hours as needed for Pain.      acetaminophen (TYLENOL) 500 MG tablet Take 1 tablet (500 mg total) by mouth every 4 (four) hours as needed for Pain or Temperature greater than (100.5 or greater). 30 tablet 0    albuterol-ipratropium (DUO-NEB) 2.5 mg-0.5 mg/3 mL nebulizer solution Take by nebulization.      apixaban (ELIQUIS) 2.5 mg Tab Take 1 tablet (2.5 mg total) by mouth 2 (two) times daily. 60 tablet 11    aspirin (ECOTRIN) 81 MG EC tablet Take 1 tablet (81 mg total) by mouth  once daily. 90 tablet 3    atorvastatin (LIPITOR) 40 MG tablet Take 1 tablet (40 mg total) by mouth once daily. 90 tablet 0    bisacodyL (DULCOLAX) 10 mg Supp Place 10 mg rectally.      cabozantinib (CABOMETYX) 60 mg Tab TAKE ONE TABLET BY MOUTH ONCE DAILY AT THE SAME TIME ON AN EMPTY STOMACH AT LEAST 1 HOUR BEFORE OR 2 HOURS AFTER EATING. AVOID GRAPEFRUIT PRODUCTS 30 tablet 4    cyclobenzaprine (FLEXERIL) 5 MG tablet TAKE 1 TABLET BY MOUTH DAILY AS NEEDED FOR MUSCLE SPASMS. 30 tablet 1    epoetin david-epbx (RETACRIT INJ) Epoetin david - epbx (Retacrit)      hyoscyamine (LEVSIN/SL) 0.125 mg Subl Place under the tongue.      lanthanum (FOSRENOL) 1000 MG chewable tablet Take 1 tablet by mouth.      levothyroxine (SYNTHROID) 75 MCG tablet TAKE 1 TABLET BY MOUTH EVERY DAY 90 tablet 3    lidocaine-prilocaine (EMLA) cream APPLY ATLEAST 30 MINUTES BEFORE TREATMENT 3 TIMES A WEEK 30 g 11    LORAZEPAM INTENSOL 2 mg/mL Conc Take by mouth.      metoprolol succinate (TOPROL-XL) 25 MG 24 hr tablet Take 1 tablet (25 mg total) by mouth once daily. 30 tablet 11    mirtazapine (REMERON) 7.5 MG Tab Take 1 tablet (7.5 mg total) by mouth every evening. 30 tablet 11    mv,Ca,min-folic acid-vit K1 (ONE-A-DAY WOMEN'S 50 PLUS) 400-20 mcg Tab Take 1 tablet by mouth once daily.      naloxone (NARCAN) 1 mg/mL injection 2 mg (1 mg per nostril) by Nasal route as needed for opioid overdose; may repeat in 3 to 5 minutes if not effective. Call 911 2 mL 11    nystatin (MYCOSTATIN) cream SMARTSIG:sparingly Topical 2-4 Times Daily PRN      ondansetron (ZOFRAN-ODT) 4 MG TbDL Take by mouth.      oxyCODONE (ROXICODONE) 5 MG immediate release tablet Take 1 tablet (5 mg total) by mouth every 6 (six) hours as needed for Pain. 11 tablet 0    pantoprazole (PROTONIX) 40 MG tablet Take 1 tablet (40 mg total) by mouth 2 (two) times daily. 60 tablet 11    traMADoL (ULTRAM) 50 mg tablet Take 1 tablet (50 mg total) by mouth every 12 (twelve) hours as needed for  Pain. 30 each 1     No current facility-administered medications for this visit.     Facility-Administered Medications Ordered in Other Visits   Medication Dose Route Frequency Provider Last Rate Last Admin    0.9%  NaCl infusion   Intravenous Continuous Soni Bhatti MD   New Bag at 07/21/23 1116       PMH:  Past Medical History:   Diagnosis Date    Anemia     Anticoagulant long-term use     Atrial fibrillation     Bronchitis 03/01/2017    Cancer 2016    kidney cancer    CHF (congestive heart failure), NYHA class II, chronic, systolic     CMV (cytomegalovirus) antibody positive     CVA (cerebral vascular accident) 1/3/2024    Encounter for blood transfusion     ESRD (end stage renal disease)     Essential hypertension 09/23/2015    H/O herpes simplex type 2 infection     Herpes simplex type 1 antibody positive     History of kidney cancer     s/p left nephrectomy 1/2016    Hyperparathyroidism, unspecified     Hyperuricemia without signs of inflammatory arthritis and tophaceous disease     Kidney stones     LGSIL (low grade squamous intraepithelial dysplasia)     Myocardiopathy 07/21/2017    Prediabetes     Proteinuria     Renal disorder     Thyroid nodule     Urate nephropathy        PSH:  Past Surgical History:   Procedure Laterality Date    BREAST CYST EXCISION      COLONOSCOPY N/A 11/12/2015    COLONOSCOPY N/A 03/12/2021    Procedure: COLONOSCOPY;  Surgeon: Brendon Lanier MD;  Location: [facility];  Service: Endoscopy;  Laterality: N/A;  covid test 3/9, labs prior, prep instr mailed -ml    EXCISION OF ARTERIOVENOUS FISTULA Left 09/27/2023    Procedure: EXCISION, AV FISTULA;  Surgeon: FARIDEH Muñoz III, MD;  Location: [facility] OR [address] [place] [address];  Service: Vascular;  Laterality: Left;  LUE AV graft excision    INSERTION OF BIVENTRICULAR IMPLANTABLE CARDIOVERTER-DEFIBRILLATOR (ICD)  04/2021    INSERTION OF TUNNELED CENTRAL VENOUS CATHETER (CVC) WITH SUBCUTANEOUS PORT N/A 01/25/2023    Procedure: INSERTION, DUAL LUMEN  CATHETER WITH PORT, WITH IMAGING GUIDANCE;  Surgeon: FARIDEH Muñoz III, MD;  [facility];  Service: Peripheral Vascular;  Laterality: N/A;  possinble permcath placment    NEPHRECTOMY-LAPAROSCOPIC Left 01/12/2016    PERCUTANEOUS TRANSLUMINAL ANGIOPLASTY OF ARTERIOVENOUS FISTULA Left 04/19/2023    Procedure: PTA, AV FISTULA;  Surgeon: FARIDEH Muñoz III, MD;  Location: [facility] CATH LAB;  Service: Peripheral Vascular;  Laterality: Left;    PERITONEAL CATHETER INSERTION      Permacath insertion  01/12/2017    PLACEMENT OF ARTERIOVENOUS GRAFT  12/28/2022    Procedure: INSERTION, GRAFT, ARTERIOVENOUS;  Surgeon: FARIDEH Muñoz III, MD;  [facility];  Service: Peripheral Vascular;;    REMOVAL, GRAFT, ARTERIOVENOUS, UPPER EXTREMITY Left 01/25/2023    Procedure: REMOVAL, GRAFT, ARTERIOVENOUS, UPPER EXTREMITY;  Surgeon: FARIDEH Muñoz III, MD;  [facility];  Service: Peripheral Vascular;  Laterality: Left;    REVISION OF ARTERIOVENOUS FISTULA Left 05/01/2023    Procedure: REVISION, AV FISTULA;  Surgeon: FARIDEH Muñoz III, MD;  [facility];  Service: Peripheral Vascular;  Laterality: Left;  LUE AVG revision    REVISION OF PROCEDURE INVOLVING ARTERIOVENOUS GRAFT Left 12/28/2022    Procedure: REVISION, PROCEDURE INVOLVING ARTERIOVENOUS GRAFT;  Surgeon: FARIDEH Muñoz III, MD;  [facility];  Service: Peripheral Vascular;  Laterality: Left;    REVISION OF PROCEDURE INVOLVING ARTERIOVENOUS GRAFT Left 07/21/2023    Procedure: LEFT ARM ARTERIOVENOUS GRAFT EXCISION  AND LIGATION;  Surgeon: Obi Werner MD;  Location: [facility];  Service: Vascular;  Laterality: Left;  Left AVG excision and revision with interposition    SALPINGOOPHORECTOMY Right 2016    KJB---DAVINCI    TONSILLECTOMY      TUBAL LIGATION         FamHx:  Family History   Problem Relation Age of Onset    Hypertension Mother     Diabetes Father     Kidney disease Father     No Known  Problems Sister     Heart disease Sister     No Known Problems Sister     No Known Problems Brother     No Known Problems Brother     Cataracts Maternal Aunt     No Known Problems Maternal Uncle     No Known Problems Paternal Aunt     No Known Problems Paternal Uncle     No Known Problems Maternal Grandmother     Diabetes Maternal Grandfather     No Known Problems Paternal Grandmother     No Known Problems Paternal Grandfather     No Known Problems Son     No Known Problems Son     No Known Problems Other     Breast cancer Neg Hx     Colon cancer Neg Hx     Cancer Neg Hx     Stroke Neg Hx     Amblyopia Neg Hx     Blindness Neg Hx     Glaucoma Neg Hx     Macular degeneration Neg Hx     Retinal detachment Neg Hx     Strabismus Neg Hx     Thyroid disease Neg Hx        SocHx:  Social History     Socioeconomic History    Marital status:     Number of children: 2   Occupational History    Occupation: [employer]     Employer: [id number]   Tobacco Use    Smoking status: Never     Passive exposure: Never    Smokeless tobacco: Never   Substance and Sexual Activity    Alcohol use: No    Drug use: Yes     Types: Hydrocodone    Sexual activity: Not Currently     Social Determinants of Health     Financial Resource Strain: Low Risk  (9/28/2023)    Overall Financial Resource Strain (CARDIA)     Difficulty of Paying Living Expenses: Not hard at all   Food Insecurity: No Food Insecurity (9/28/2023)    Hunger Vital Sign     Worried About Running Out of Food in the Last Year: Never true     Ran Out of Food in the Last Year: Never true   Transportation Needs: No Transportation Needs (9/28/2023)    PRAPARE - Transportation     Lack of Transportation (Medical): No     Lack of Transportation (Non-Medical): No   Physical Activity: Inactive (9/28/2023)    Exercise Vital Sign     Days of Exercise per Week: 0 days     Minutes of Exercise per Session: 0 min   Stress: No Stress Concern Present (9/28/2023)    [employer] - Occupational Stress Questionnaire     Feeling of Stress : Not at all   Social Connections: Moderately Integrated (9/28/2023)    Social Connection and Isolation Panel [NHANES]     Frequency of Communication with Friends and Family: More than three times a week     Frequency of Social Gatherings with Friends and Family: More than three times a week     Attends Spiritism Services: More than 4 times per year     Active Member of Clubs or Organizations: No     Attends Club or Organization Meetings: Never     Marital Status:    Housing Stability: Low Risk  (9/28/2023)    Housing Stability Vital Sign     Unable to Pay for Housing in the Last Year: No     Number of Places Lived in the Last Year: 1     Unstable Housing in the Last Year: No         Objective:     Vitals:    03/18/24 0910   BP: 127/87   Pulse: 92   Resp: 14   Temp: 97.7 °F (36.5 °C)           Physical Exam  Vitals and nursing note reviewed.   Constitutional:       General: She is not in acute distress.     Appearance: She is well-developed.      Comments: Thin appearance, in wheelchair   HENT:      Head: Normocephalic and atraumatic.      Nose: Nose normal.      Mouth/Throat:      Pharynx: No oropharyngeal exudate.   Eyes:      General:         Right eye: No discharge.         Left eye: No discharge.      Conjunctiva/sclera: Conjunctivae normal.      Pupils: Pupils are equal, round, and reactive to light.   Neck:      Trachea: No tracheal deviation.   Cardiovascular:      Comments: No swelling to bilateral lower extremities.   Musculoskeletal:         General: No tenderness. Normal range of motion.      Comments:      Skin:     General: Skin is warm and dry.      Coloration: Skin is not pale.      Findings: No erythema or rash.      Comments: Ulcerations on LE   Neurological:      Mental Status: She is alert and oriented to person, place, and time.   Psychiatric:         Behavior: Behavior normal.         Thought Content: Thought  content normal.      LABS:  WBC   Date Value Ref Range Status   03/05/2024 4.46 3.90 - 12.70 K/uL Final     Hemoglobin   Date Value Ref Range Status   03/05/2024 12.8 12.0 - 16.0 g/dL Final     POC Hematocrit   Date Value Ref Range Status   10/10/2023 32 (L) 36 - 54 %PCV Final     Hematocrit   Date Value Ref Range Status   03/05/2024 43.7 37.0 - 48.5 % Final     Platelets   Date Value Ref Range Status   03/05/2024 180 150 - 450 K/uL Final       Chemistry        Component Value Date/Time     03/05/2024 1855    K 3.6 03/05/2024 1855     03/05/2024 1855    CO2 22 (L) 03/05/2024 1855    BUN 26 (H) 03/05/2024 1855    CREATININE 3.6 (H) 03/05/2024 1855    GLU 99 03/05/2024 1855        Component Value Date/Time    CALCIUM 8.6 (L) 03/05/2024 1855    ALKPHOS 352 (H) 03/05/2024 1855    AST 31 03/05/2024 1855    ALT 16 03/05/2024 1855    BILITOT 1.0 03/05/2024 1855        CT Chest Abdomen Pelvis Without Contrast (XPD)  Narrative: EXAMINATION:  CT CHEST ABDOMEN PELVIS WITHOUT CONTRAST(XPD)    CLINICAL HISTORY:  metastatic RCC, assess for disease progression; Secondary malignant neoplasm of other specified sites    TECHNIQUE:  Low dose axial images, sagittal and coronal reformations were obtained from the thoracic inlet to the pubic symphysis 10/10/2023 CT chest x-ray 01/02/2024    COMPARISON:  10/10/2023 CT, chest x-ray 01/02/2024    FINDINGS:  Universal extensive calcified plaque all included vessels.  Two calcified gallstones in gallbladder lumen 2.4 cm size.  Postop history of left nephrectomy, pancreatic tail, bowel structures in left renal bed without definite new abnormality.  Right kidney atrophic with cyst deformity, upper pole 4 cm, midpole 3.4 cm, lower pole with linea calcification 2.2 cm.    Liver and pancreas stable, appear intact.  Adrenal glands biliary tree pancreas unchanged.    No free fluid or lymphadenopathy.  Bladder decompressed solitary a under 1 cm size calcified anterior fundal fibroid,  uterus adnexal structures otherwise appear intact.    Moderate amount of fecal debris large bowel.    Subcutaneous edema, anasarca.    Retroperitoneal mass of 2.5 x 3 by 4 cm anterior to bifurcation of the aorta at level of umbilicus image 98 series 4 with rim calcification portion of anterior aspect enter or vessel traversing anterior aspect, retrospectively seen on 07/24/2023 CTA and CT abdomen pelvis 10/10/2023 showing slight enhancement on those IV contrast exams.    Mild spondylosis dorsal spine, prompt facet joint arthropathy lumbosacral junction.    Cardiomegaly with partial linea calcification walls of left atrium, enlargement of right atrium right and left ventricular chamber is, enlargement of pericardial effusion posteroinferior, epicardial pacemaker, left subclavian venous catheter stable unchanged.  Mild aneurysmal dilation ascending and thoracic aorta stable, calcified plaque aorta great vessels coronary arteries.  No new hilar or mediastinal mass.  Focal calcific densities thyroid gland largest on right inferior lobe 1.6 cm.  Left axillary probable vascular aneurysmal calcification axillary vein, adjacent tubular structure from dialysis AV shunt therapy and clinical correlation requested.    Previous right lower lobe nodule not identified.  New sub pleural 3 mm nodule image 298 series 7, new interstitial densities both lower lobes.  Calcified granuloma right middle lobe medial segment stable.  Remote amorphous calcified interstitial infiltrates medial aspect anterior segment right upper lobe stable.    Left lung again shows scarring compression atelectasis lateral basilar segment lower lobe, some compression atelectasis of adjacent lower lobe and or other new change.  No new pleural reaction.  Tracheobronchial tree remains intact.  Impression: 1. Retroperitoneal mass at level of umbilicus anterior bifurcation aorta discussed above with some enhancement and rim calcification, appearing 07/24/2023 not  seen on prior exams, differential diagnosis includes slow flow occluded aneurysm, peritoneal metastatic disease.  2. New lung sub pleural nodule, clearing of previous nodule right lower lobe.  Some superimposed interstitial infiltrates suspicious for pulmonary edema, lungs otherwise unchanged.  3. New increasing pericardial effusion.  4. This report was flagged in Epic as abnormal.  Clinical correlation, consideration for progress exam regarding new peritoneal mass effect.  Periumbilical aorta iliac artery ultrasound and MRI abdomen pelvis with IV contrast suggested for consideration as indicated.    Electronically signed by: Simone Zamudio MD  Date:    03/04/2024  Time:    15:41      Assessment:       1. Metastatic renal cell carcinoma to intra-abdominal site    2. S/p nephrectomy left    3. Metastatic renal cell carcinoma, left    4. ESRD (end stage renal disease) on dialysis    5. Combined systolic and diastolic cardiac dysfunction            Plan:        Cancer Staging   Renal cell carcinoma of left kidney  Staging form: Kidney, AJCC 8th Edition  - Pathologic stage from 1/12/2016: Stage Unknown (pT1b(2), pNX, cM0) - Unsigned  - Clinical stage from 11/22/2016: Stage IV (rcTX, cN0, pM1) - Signed by Liset Ngo NP on 9/13/2023    Pt has had SD for long time on [place], and last scans were largely stable, however, no longer able to tolerate therapy due to comorbidities (HD/ESRD, etc) and LE infection/wounds.  She was most recently enrolled on hospice but was then told she was not allowed to continue dialysis. Long discussion with patient and her accompanying  regarding current clinical status and that she does not qualify for further RCC treatment. They are in agreement and would like see current status of cancer activity, which is not unreasonable.     Scans show possible enlargement of retroperitoneal mass which is not surprising that we are currently holding therapy. Will refer to cardio oncology to  optimize treatment.    RTC 3 months with CT scan, labs (CBC,CMP), and to see Dr. Rivera.    Patient was also seen and examined by Dr. Rivera. Patient is in agreement with the proposed treatment plan. All questions were answered to the patient's satisfaction. Pt knows to call clinic if anything is needed before the next clinic visit.      Liset Ngo, MSN, APRN, FNP-C  Hematology and Medical Oncology  Manager- [facility]  Nurse Practitioner/Clinical Investigator, [facility]  Nurse Practitioner to Dr. Ben Rivera      I have reviewed the notes, assessments, and/or procedures performed by the nurse practitioner, as above.  I have personally interviewed the patient at the beside, and rounded with the nurse practitioner. I formulated the plan of care.  I concur with her documentation of Eva Lopez Wolf.      Recently came off hospice.  As above, scans showed some PD off therapy, as expected. Given QOL, co-morbidities, and PS concerns, will continuing monitoring for now.      I, Dr. Ben Rivera, personally spent more than 40 mins during this encounter.     Ben Rivera M.D., M.S., F.A.C.P.  Hematology/Oncology Attending  [facility]             Med Onc Chart Routing      Follow up with physician . RTC 3 months with CT scan, labs (CBC,CMP), and to see Dr. Rivera.   Follow up with [facility]    Infusion scheduling note    Injection scheduling note    Labs    Imaging    Pharmacy appointment    Other referrals       Additional referrals needed  refer to cardio-onc, Dr. Louis.

## 2024-03-19 DIAGNOSIS — S52.501A CLOSED FRACTURE OF DISTAL ENDS OF RIGHT RADIUS AND ULNA, INITIAL ENCOUNTER: Primary | ICD-10-CM

## 2024-03-19 DIAGNOSIS — S52.601A CLOSED FRACTURE OF DISTAL ENDS OF RIGHT RADIUS AND ULNA, INITIAL ENCOUNTER: Primary | ICD-10-CM

## 2024-03-20 ENCOUNTER — OFFICE VISIT (OUTPATIENT)
Dept: ORTHOPEDICS | Facility: CLINIC | Age: 59
End: 2024-03-20
Attending: ORTHOPAEDIC SURGERY
Payer: MEDICARE

## 2024-03-20 DIAGNOSIS — S52.601A CLOSED FRACTURE OF DISTAL ENDS OF RIGHT RADIUS AND ULNA, INITIAL ENCOUNTER: Primary | ICD-10-CM

## 2024-03-20 DIAGNOSIS — S52.501A CLOSED FRACTURE OF DISTAL ENDS OF RIGHT RADIUS AND ULNA, INITIAL ENCOUNTER: Primary | ICD-10-CM

## 2024-03-20 PROCEDURE — 99999 PR PBB SHADOW E&M-EST. PATIENT-LVL III: CPT | Mod: PBBFAC,,, | Performed by: ORTHOPAEDIC SURGERY

## 2024-03-20 PROCEDURE — 99213 OFFICE O/P EST LOW 20 MIN: CPT | Mod: PBBFAC,GW,PN | Performed by: ORTHOPAEDIC SURGERY

## 2024-03-20 PROCEDURE — 99213 OFFICE O/P EST LOW 20 MIN: CPT | Mod: S$PBB,GW,, | Performed by: ORTHOPAEDIC SURGERY

## 2024-03-20 NOTE — PROGRESS NOTES
Assessment: 58 y.o. female with R minimally displaced DR and DU fractures    I explained my diagnostic impression and the reasoning behind it in detail, using layman's terms.      Plan:   - ROM hand and wrist - She has been instructed in this several times without improvement. HH OT written  - no painful activity   - EXOS splint with activity   - Return to clinic in 4 weeks with new xrays.     All questions were answered in detail. The patient is in full agreement with the treatment plan and will proceed accordingly.    Chief Complaint   Patient presents with    Right Wrist - Pain, Injury       Initial visit (1/31/23): Eva Bloom is a 58 y.o. female who presents today complaining of Pain and Injury of the Right Wrist     DOI: 1/16/24  Fall   Sustained fracture of R DR  Splinted   Here for follow up  Denies numbness, tingling     2/21/24  5 weeks out from injury   Pain is intermittent     This is the extent of the patient's complaints at this time.     Hand dominance: Right          Review of patient's allergies indicates:   Allergen Reactions    Carvedilol Other (See Comments)     Nausea/vomiting    Allopurinol      Other reaction(s): abnormal transaminases         Current Outpatient Medications:     acetaminophen (TYLENOL) 500 MG tablet, Take 500 mg by mouth every 6 (six) hours as needed for Pain., Disp: , Rfl:     acetaminophen (TYLENOL) 500 MG tablet, Take 1 tablet (500 mg total) by mouth every 4 (four) hours as needed for Pain or Temperature greater than (100.5 or greater)., Disp: 30 tablet, Rfl: 0    albuterol-ipratropium (DUO-NEB) 2.5 mg-0.5 mg/3 mL nebulizer solution, Take by nebulization., Disp: , Rfl:     apixaban (ELIQUIS) 2.5 mg Tab, Take 1 tablet (2.5 mg total) by mouth 2 (two) times daily., Disp: 60 tablet, Rfl: 11    aspirin (ECOTRIN) 81 MG EC tablet, Take 1 tablet (81 mg total) by mouth once daily., Disp: 90 tablet, Rfl: 3    atorvastatin (LIPITOR) 40 MG tablet, Take 1 tablet (40 mg total) by  mouth once daily., Disp: 90 tablet, Rfl: 0    bisacodyL (DULCOLAX) 10 mg Supp, Place 10 mg rectally., Disp: , Rfl:     cabozantinib (CABOMETYX) 60 mg Tab, TAKE ONE TABLET BY MOUTH ONCE DAILY AT THE SAME TIME ON AN EMPTY STOMACH AT LEAST 1 HOUR BEFORE OR 2 HOURS AFTER EATING. AVOID GRAPEFRUIT PRODUCTS, Disp: 30 tablet, Rfl: 4    cyclobenzaprine (FLEXERIL) 5 MG tablet, TAKE 1 TABLET BY MOUTH DAILY AS NEEDED FOR MUSCLE SPASMS., Disp: 30 tablet, Rfl: 1    epoetin david-epbx (RETACRIT INJ), Epoetin david - epbx (Retacrit), Disp: , Rfl:     hyoscyamine (LEVSIN/SL) 0.125 mg Subl, Place under the tongue., Disp: , Rfl:     lanthanum (FOSRENOL) 1000 MG chewable tablet, Take 1 tablet by mouth., Disp: , Rfl:     levothyroxine (SYNTHROID) 75 MCG tablet, TAKE 1 TABLET BY MOUTH EVERY DAY, Disp: 90 tablet, Rfl: 3    lidocaine-prilocaine (EMLA) cream, APPLY ATLEAST 30 MINUTES BEFORE TREATMENT 3 TIMES A WEEK, Disp: 30 g, Rfl: 11    LORAZEPAM INTENSOL 2 mg/mL Conc, Take by mouth., Disp: , Rfl:     metoprolol succinate (TOPROL-XL) 25 MG 24 hr tablet, Take 1 tablet (25 mg total) by mouth once daily., Disp: 30 tablet, Rfl: 11    mirtazapine (REMERON) 7.5 MG Tab, Take 1 tablet (7.5 mg total) by mouth every evening., Disp: 30 tablet, Rfl: 11    mv,Ca,min-folic acid-vit K1 (ONE-A-DAY WOMEN'S 50 PLUS) 400-20 mcg Tab, Take 1 tablet by mouth once daily., Disp: , Rfl:     naloxone (NARCAN) 1 mg/mL injection, 2 mg (1 mg per nostril) by Nasal route as needed for opioid overdose; may repeat in 3 to 5 minutes if not effective. Call 911, Disp: 2 mL, Rfl: 11    nystatin (MYCOSTATIN) cream, SMARTSIG:sparingly Topical 2-4 Times Daily PRN, Disp: , Rfl:     ondansetron (ZOFRAN-ODT) 4 MG TbDL, Take by mouth., Disp: , Rfl:     oxyCODONE (ROXICODONE) 5 MG immediate release tablet, Take 1 tablet (5 mg total) by mouth every 6 (six) hours as needed for Pain., Disp: 11 tablet, Rfl: 0    pantoprazole (PROTONIX) 40 MG tablet, Take 1 tablet (40 mg total) by mouth 2  (two) times daily., Disp: 60 tablet, Rfl: 11    traMADoL (ULTRAM) 50 mg tablet, Take 1 tablet (50 mg total) by mouth every 12 (twelve) hours as needed for Pain., Disp: 30 each, Rfl: 1  No current facility-administered medications for this visit.    Facility-Administered Medications Ordered in Other Visits:     0.9%  NaCl infusion, , Intravenous, Continuous, Soni Bhatti MD, New Bag at 07/21/23 1116    Physical Exam:   Vitals:    03/20/24 0856   PainSc:   6   PainLoc: Wrist       General:  Patient is alert, awake and oriented to time, place and person. Mood and affect are appropriate.  Patient does not appear to be in any distress, denies any constitutional symptoms and appears stated age.   HEENT:  Pupils are equal and round, sclera are not injected. External examination of ears and nose reveals no abnormalities. Cranial nerves II-X are grossly intact  Skin:  no rashes, abrasions or open wounds on the affected extremity   Resp:  No respiratory distress or audible wheezing   CV: 2+  pulses, all extremities warm and well perfused   Right Wrist   hand swelling resolved  NTTP over DR  TTP over DU   LTSI m/u/r  2+ RP  + EPL, IO, FDS, FDP       Imaging: 3 views of the right wrist show vascular calcifications, non displaced fracture of the distal radius - no change in position. Small amount of callus visible on radial aspect of distal radius, no obvious callus to ulna    I personally reviewed and interpreted the patient's imaging obtained prior to visit        This note was created by combination of typed  and M-Modal dictation. Transcription and phonetic errors may be present.  If there are any questions, please contact me.    Past Medical History:   Diagnosis Date    Anemia     Anticoagulant long-term use     Atrial fibrillation     Bronchitis 03/01/2017    Cancer 2016    kidney cancer    CHF (congestive heart failure), NYHA class II, chronic, systolic     CMV (cytomegalovirus) antibody positive     CVA  (cerebral vascular accident) 1/3/2024    Encounter for blood transfusion     ESRD (end stage renal disease)     Essential hypertension 09/23/2015    H/O herpes simplex type 2 infection     Herpes simplex type 1 antibody positive     History of kidney cancer     s/p left nephrectomy 1/2016    Hyperparathyroidism, unspecified     Hyperuricemia without signs of inflammatory arthritis and tophaceous disease     Kidney stones     LGSIL (low grade squamous intraepithelial dysplasia)     Myocardiopathy 07/21/2017    Prediabetes     Proteinuria     Renal disorder     Thyroid nodule     Urate nephropathy        Active Problem List with Overview Notes    Diagnosis Date Noted    Ischemic ulcer of left heel due to atherosclerosis 03/04/2024    Incontinence associated dermatitis 01/04/2024    CVA (cerebral vascular accident) 01/03/2024    Acute metabolic encephalopathy 01/02/2024    Acute blood loss anemia 11/01/2023    PAD (peripheral artery disease) 11/01/2023    Hypothermia 10/12/2023    Severe protein-calorie malnutrition 10/11/2023    ACP (advance care planning) 10/11/2023    Weight loss 10/10/2023    Body mass index (BMI) less than 19 09/28/2023    Hoarseness of voice 09/21/2023    Sensation of fullness in both ears 09/21/2023    Chronic combined systolic and diastolic heart failure 08/25/2023    Metastatic renal cell carcinoma to lung, unspecified laterality 08/11/2023    Hemorrhoid 07/24/2023    Critical limb ischemia of left lower extremity with gangrene 07/20/2023    Ischemic ulcer of toe of left foot, with unspecified severity 07/19/2023    Hypothyroidism 07/19/2023    Aortic atherosclerosis 07/06/2023    Arteriovenous graft infection 01/24/2023    Malfunction of arteriovenous dialysis fistula 12/16/2022    Debility 11/04/2022    PAF (paroxysmal atrial fibrillation) 07/25/2022    Glucose intolerance 04/18/2022    Hypercalcemia 04/04/2022    ICD (implantable cardioverter-defibrillator) in place - SubQ 07/15/2021     "Hyperphosphatemia     Anemia in ESRD (end-stage renal disease)     Chronic kidney disease-mineral and bone disorder     Chronic combined systolic and diastolic congestive heart failure     Nonrheumatic mitral valve regurgitation 06/01/2020    ESRD (end stage renal disease) on dialysis      - Tuesday, Thursday, and Saturday  - Chelsie Hart, nephrology      Cardiomyopathy, nonischemic 07/21/2017    Pulmonary hypertension 07/21/2017    Primary insomnia 06/14/2017    Stenosis of arteriovenous dialysis fistula 02/17/2017    S/p nephrectomy left 10/07/2016    History of kidney cancer 09/02/2016    Renal cell carcinoma of left kidney 01/27/2016    Multinodular goiter 11/25/2015     - benign FNA 12/2015      CMV (cytomegalovirus) antibody positive     Herpes simplex type 1 antibody positive     H/O herpes simplex type 2 infection      - positive antibody test  - reports no hx of outbreaks      Essential hypertension 09/23/2015    Pap smear abnormality of cervix with ASCUS favoring benign 09/23/2015     She is having the PAP "once a year and comes back ok she says"      Urate nephropathy     Kidney stones     Hyperparathyroidism, secondary renal     Hyperuricemia without signs of inflammatory arthritis and tophaceous disease     Proteinuria        Past Surgical History:   Procedure Laterality Date    BREAST CYST EXCISION      COLONOSCOPY N/A 11/12/2015    COLONOSCOPY N/A 03/12/2021    Procedure: COLONOSCOPY;  Surgeon: Brendon Lanier MD;  Location: [facility];  Service: Endoscopy;  Laterality: N/A;  covid test 3/9, labs prior, prep instr mailed -ml    EXCISION OF ARTERIOVENOUS FISTULA Left 09/27/2023    Procedure: EXCISION, AV FISTULA;  Surgeon: FARIDEH Muñoz III, MD;  Location: [facility] OR [address] [place] [address];  Service: Vascular;  Laterality: Left;  LUE AV graft excision    INSERTION OF BIVENTRICULAR IMPLANTABLE CARDIOVERTER-DEFIBRILLATOR (ICD)  04/2021    INSERTION OF TUNNELED CENTRAL VENOUS CATHETER (CVC) WITH " SUBCUTANEOUS PORT N/A 01/25/2023    Procedure: INSERTION, DUAL LUMEN CATHETER WITH PORT, WITH IMAGING GUIDANCE;  Surgeon: FARIDEH Muñoz III, MD;  [facility];  Service: Peripheral Vascular;  Laterality: N/A;  possinble permcath placment    NEPHRECTOMY-LAPAROSCOPIC Left 01/12/2016    PERCUTANEOUS TRANSLUMINAL ANGIOPLASTY OF ARTERIOVENOUS FISTULA Left 04/19/2023    Procedure: PTA, AV FISTULA;  Surgeon: FARIDEH Muñoz III, MD;  Location: [facility] CATH LAB;  Service: Peripheral Vascular;  Laterality: Left;    PERITONEAL CATHETER INSERTION      Permacath insertion  01/12/2017    PLACEMENT OF ARTERIOVENOUS GRAFT  12/28/2022    Procedure: INSERTION, GRAFT, ARTERIOVENOUS;  Surgeon: FARIDEH Muñoz III, MD;  [facility];  Service: Peripheral Vascular;;    REMOVAL, GRAFT, ARTERIOVENOUS, UPPER EXTREMITY Left 01/25/2023    Procedure: REMOVAL, GRAFT, ARTERIOVENOUS, UPPER EXTREMITY;  Surgeon: FARIDEH Muñoz III, MD;  [facility];  Service: Peripheral Vascular;  Laterality: Left;    REVISION OF ARTERIOVENOUS FISTULA Left 05/01/2023    Procedure: REVISION, AV FISTULA;  Surgeon: FARIDEH Muñoz III, MD;  [facility];  Service: Peripheral Vascular;  Laterality: Left;  LUE AVG revision    REVISION OF PROCEDURE INVOLVING ARTERIOVENOUS GRAFT Left 12/28/2022    Procedure: REVISION, PROCEDURE INVOLVING ARTERIOVENOUS GRAFT;  Surgeon: FARIDEH Muñoz III, MD;  [facility];  Service: Peripheral Vascular;  Laterality: Left;    REVISION OF PROCEDURE INVOLVING ARTERIOVENOUS GRAFT Left 07/21/2023    Procedure: LEFT ARM ARTERIOVENOUS GRAFT EXCISION  AND LIGATION;  Surgeon: Obi Werner MD;  Location: [facility];  Service: Vascular;  Laterality: Left;  Left AVG excision and revision with interposition    SALPINGOOPHORECTOMY Right 2016    KJB---DAVINCI    TONSILLECTOMY      TUBAL LIGATION         Family History   Problem Relation Age of Onset    Hypertension Mother      Diabetes Father     Kidney disease Father     No Known Problems Sister     Heart disease Sister     No Known Problems Sister     No Known Problems Brother     No Known Problems Brother     Cataracts Maternal Aunt     No Known Problems Maternal Uncle     No Known Problems Paternal Aunt     No Known Problems Paternal Uncle     No Known Problems Maternal Grandmother     Diabetes Maternal Grandfather     No Known Problems Paternal Grandmother     No Known Problems Paternal Grandfather     No Known Problems Son     No Known Problems Son     No Known Problems Other     Breast cancer Neg Hx     Colon cancer Neg Hx     Cancer Neg Hx     Stroke Neg Hx     Amblyopia Neg Hx     Blindness Neg Hx     Glaucoma Neg Hx     Macular degeneration Neg Hx     Retinal detachment Neg Hx     Strabismus Neg Hx     Thyroid disease Neg Hx

## 2024-03-21 ENCOUNTER — DOCUMENT SCAN (OUTPATIENT)
Dept: HOME HEALTH SERVICES | Facility: HOSPITAL | Age: 59
End: 2024-03-21
Payer: MEDICARE

## 2024-03-25 ENCOUNTER — HOSPITAL ENCOUNTER (OUTPATIENT)
Dept: WOUND CARE | Facility: HOSPITAL | Age: 59
Discharge: HOME | End: 2024-03-25
Attending: INTERNAL MEDICINE
Payer: MEDICARE

## 2024-03-25 VITALS
TEMPERATURE: 97 F | RESPIRATION RATE: 18 BRPM | HEART RATE: 97 BPM | DIASTOLIC BLOOD PRESSURE: 79 MMHG | SYSTOLIC BLOOD PRESSURE: 123 MMHG

## 2024-03-25 DIAGNOSIS — I70.244: Primary | ICD-10-CM

## 2024-03-25 DIAGNOSIS — N18.6 ESRD (END STAGE RENAL DISEASE) ON DIALYSIS: ICD-10-CM

## 2024-03-25 DIAGNOSIS — Z99.2 ESRD (END STAGE RENAL DISEASE) ON DIALYSIS: ICD-10-CM

## 2024-03-25 DIAGNOSIS — L89.620 PRESSURE INJURY OF LEFT HEEL, UNSTAGEABLE: ICD-10-CM

## 2024-03-25 DIAGNOSIS — L97.429: Primary | ICD-10-CM

## 2024-03-25 DIAGNOSIS — L89.893 PRESSURE ULCER OF RIGHT LEG, STAGE 3: ICD-10-CM

## 2024-03-25 DIAGNOSIS — C64.9 METASTATIC RENAL CELL CARCINOMA, UNSPECIFIED LATERALITY: ICD-10-CM

## 2024-03-25 DIAGNOSIS — I50.22 CHRONIC SYSTOLIC HEART FAILURE: ICD-10-CM

## 2024-03-25 PROCEDURE — 99213 OFFICE O/P EST LOW 20 MIN: CPT | Performed by: INTERNAL MEDICINE

## 2024-03-25 PROCEDURE — 99214 OFFICE O/P EST MOD 30 MIN: CPT | Mod: GV,,, | Performed by: INTERNAL MEDICINE

## 2024-03-25 NOTE — PROGRESS NOTES
[facility]                Progress Note    Subjective:       Patient ID: Eva Bloom is a 58 y.o. female.    Chief Complaint: Non-healing Wound Follow Up    Follow up wound care visit. Patient brought to the exam room in a wheelchair by , with nurse standing. Patient ambulated from the wheelchair to the exam chair with  assist, with nurse standing by. Patient wearing Darco shoe to the right and left foot. She c/o pain to the Left Lower Leg rating at 4/10 at present. Denies fever, chills, nausea, vomiting, or diarrhea at present. Dressing intact, without strike through drainage. Patient's  is her caretaker at home.  reported HH came out as order.  Topical 5% Lidocaine applied and allowed to absorb for 15 minutes for pain management.  states patient went to the ER on 03/05/2024 for a nose blood, since then patient has only one or two more episodes with mostly spotting.  states patient continue to receive dialysis treatment on Tuesday, Thursday and Saturday. Wound care done per MD order. AVS printed and given to the .    No draiange from right thigh wound/dressing her  has been painting left heel and second toe with betadine daily no discharge she is running full times on dialysis withotu hypotension. Her follow up appointment with vascular surgery was rescheduled to April 29th. She is using wheelchair for all mobility outside home. She uses a rolling walker with short distances in side home. Weight unchanged from last visit no LE swelling    Return to clinic in 3 week.            Review of Systems      Objective:        Physical Exam  Constitutional:       Appearance: She is ill-appearing.      Comments: cachectic   Musculoskeletal:      Right lower leg: No edema.      Left lower leg: No edema.   Skin:     Comments: Right lateral thigh wound measures same no exposed bone or muscle layer no expressible  discharge    Left heel shows firm eschar without fluctuance or surrounding erythema DP is non palpable on left     Left second toe with adherent yellow to black eschar without fluctuance   Neurological:      Mental Status: She is alert.         Vitals:    03/25/24 0830   BP: 123/79   Pulse: 97   Resp: 18   Temp: 97.3 °F (36.3 °C)       Assessment:           ICD-10-CM ICD-9-CM   1. Ischemic ulcer of left heel due to atherosclerosis  I70.244 440.23    L97.429 707.14   2. Pressure injury of left heel, unstageable  L89.620 707.07     707.25   3. Pressure ulcer of right leg, stage 3  L89.893 707.09     707.23                Plan:          No sharp debridement given evidence of significant proximal arterial disease of left lower extremity no evidence of infection no drainage continue daily betadine pain offloading heel/floating heels when seated or lying down     Continue foam border to thigh wound change weekly with home health    Tissue pathology and/or culture taken     [] Yes      [x] No  Sharp debridement performed                   [] Yes       [x] No  Labs ordered     [] Yes       [x] No  Imaging ordered    [] Yes      [x] No    No orders of the defined types were placed in this encounter.       Follow up in about 3 weeks (around 4/15/2024) for wound care.

## 2024-04-08 PROBLEM — I63.9 CVA (CEREBRAL VASCULAR ACCIDENT): Status: RESOLVED | Noted: 2024-01-03 | Resolved: 2024-04-08

## 2024-04-10 ENCOUNTER — OFFICE VISIT (OUTPATIENT)
Dept: CARDIOLOGY | Facility: CLINIC | Age: 59
DRG: 871 | End: 2024-04-10
Payer: MEDICARE

## 2024-04-10 VITALS
BODY MASS INDEX: 16.9 KG/M2 | HEART RATE: 56 BPM | SYSTOLIC BLOOD PRESSURE: 108 MMHG | WEIGHT: 99 LBS | HEIGHT: 64 IN | DIASTOLIC BLOOD PRESSURE: 66 MMHG | OXYGEN SATURATION: 97 %

## 2024-04-10 DIAGNOSIS — Z95.810 ICD (IMPLANTABLE CARDIOVERTER-DEFIBRILLATOR) IN PLACE: ICD-10-CM

## 2024-04-10 DIAGNOSIS — C79.89 METASTATIC RENAL CELL CARCINOMA TO INTRA-ABDOMINAL SITE: ICD-10-CM

## 2024-04-10 DIAGNOSIS — I51.89 COMBINED SYSTOLIC AND DIASTOLIC CARDIAC DYSFUNCTION: ICD-10-CM

## 2024-04-10 DIAGNOSIS — I70.0 AORTIC ATHEROSCLEROSIS: ICD-10-CM

## 2024-04-10 DIAGNOSIS — Z99.2 ESRD (END STAGE RENAL DISEASE) ON DIALYSIS: ICD-10-CM

## 2024-04-10 DIAGNOSIS — C64.9 METASTATIC RENAL CELL CARCINOMA TO INTRA-ABDOMINAL SITE: ICD-10-CM

## 2024-04-10 DIAGNOSIS — D62 ACUTE BLOOD LOSS ANEMIA: ICD-10-CM

## 2024-04-10 DIAGNOSIS — I70.262 CRITICAL LIMB ISCHEMIA OF LEFT LOWER EXTREMITY WITH GANGRENE: ICD-10-CM

## 2024-04-10 DIAGNOSIS — I50.42 CHRONIC COMBINED SYSTOLIC AND DIASTOLIC CONGESTIVE HEART FAILURE: ICD-10-CM

## 2024-04-10 DIAGNOSIS — I73.9 PAD (PERIPHERAL ARTERY DISEASE): ICD-10-CM

## 2024-04-10 DIAGNOSIS — I34.0 NONRHEUMATIC MITRAL VALVE REGURGITATION: ICD-10-CM

## 2024-04-10 DIAGNOSIS — I48.0 PAF (PAROXYSMAL ATRIAL FIBRILLATION): Chronic | ICD-10-CM

## 2024-04-10 DIAGNOSIS — N18.6 ESRD (END STAGE RENAL DISEASE) ON DIALYSIS: ICD-10-CM

## 2024-04-10 DIAGNOSIS — T82.590D MALFUNCTION OF ARTERIOVENOUS DIALYSIS FISTULA, SUBSEQUENT ENCOUNTER: ICD-10-CM

## 2024-04-10 DIAGNOSIS — I42.8 CARDIOMYOPATHY, NONISCHEMIC: Chronic | ICD-10-CM

## 2024-04-10 DIAGNOSIS — I10 ESSENTIAL HYPERTENSION: Primary | Chronic | ICD-10-CM

## 2024-04-10 PROCEDURE — 99215 OFFICE O/P EST HI 40 MIN: CPT | Mod: PBBFAC | Performed by: INTERNAL MEDICINE

## 2024-04-10 PROCEDURE — 99999 PR PBB SHADOW E&M-EST. PATIENT-LVL V: CPT | Mod: PBBFAC,,, | Performed by: INTERNAL MEDICINE

## 2024-04-10 RX ORDER — ATORVASTATIN CALCIUM 40 MG/1
40 TABLET, FILM COATED ORAL DAILY
Qty: 90 TABLET | Refills: 3 | Status: SHIPPED | OUTPATIENT
Start: 2024-04-10

## 2024-04-10 NOTE — PROGRESS NOTES
Subjective   Patient ID:  Eva Bloom is a 58 y.o. female who presents for follow-up of No chief complaint on file.      HPI    [place] [facility] [place]    Nonischemic cardiomyopathy   HFrEF s/p SC-ICD  Chronic pericardial effusion   Hypertension  Pulmonary hypertension  Renal cell carcinoma  ESRD on HD     Interval History  Presents with CP x 3-4 days. States it is a light pain and that comes and goes randomly and lasts a few seconds at a time. Nonexertional. She also reports LUE swelling. She has AVF in LUE. Also mild LE edema which is chronic. She denies SOB, syncope, near syncope, sustained palpitations. She had a nosebleed recently. Packing was removed about a week ago, no further bleeding. BP is typically 130s/70s, sometimes higher, at home. Of note, she was switched from Olmesartan to Entresto following her last visit. The Entresto caused diarrhea so she stopped it.      1/2021 HPI (Sowmya Stock)  Ms. Bloom is in clinic today for routine follow up.  At her last visit her, her metoprolol was increased to 100 mg daily and imdur was changed to isordil 30 mg BID to match components of bydil.  Denies hypotension during HD.  Her BP is running 130-140s systolic.  Patient denies chest pain with exertion or at rest, palpitations, SOB, WARREN, dizziness, syncope, edema, orthopnea, PND, or claudication.  Reports no routine exercise due to fatigue.     Echo 5/8/23  The left ventricle is normal in size with mild eccentric hypertrophy and severely decreased systolic function.  The estimated ejection fraction is 23%.  There is severe left ventricular global hypokinesis.  Grade II left ventricular diastolic dysfunction.  Moderate left atrial enlargement.  Normal right ventricular size with normal right ventricular systolic function.  Moderate right atrial enlargement.  There is mild aortic valve stenosis.  Aortic valve area is 1.58 cm2; peak velocity is 2.01 m/s; mean gradient is 8 mmHg.  Moderate mitral regurgitation.  Moderate  to severe tricuspid regurgitation.  Mild pulmonic regurgitation.  Elevated central venous pressure (15 mmHg).  The estimated PA systolic pressure is 47 mmHg.  There is mild-moderate pulmonary hypertension.  Moderate posterolateral pericardial effusion. Trivial under the RA     Echo 2/21  Mild concentric hypertrophy and moderately decreased systolic function. The estimated ejection fraction is 30%  Grade II left ventricular diastolic dysfunction.  Moderate mitral regurgitation.  Mild right ventricular enlargement with normal right ventricular systolic function.  Moderate to severe tricuspid regurgitation.  Mild aortic regurgitation.  Severe left atrial enlargement.  Moderate right atrial enlargement.  Intermediate central venous pressure (8 mmHg).  The estimated PA systolic pressure is 72 mmHg.  There is pulmonary hypertension.  Moderate posterior pericardial effusion.     PET 2017  CONCLUSIONS: NORMAL MYOCARDIAL PERFUSION PET STRESS TEST   1. The perfusion scan is free of evidence for myocardial ischemia or injury.   2. There is abnormal wall motion at rest showing mild global hypokinesis of the left ventricle. There is abnormal wall motion at stress showing mild global hypokinesis of the left ventricle.   3. There is resting LV dysfunction with a reduced ejection fraction of 41 %. There is stress induced LV dysfunction with a reduced ejection fraction of 48 %.  (normal is >= 51%)   4. The ventricular volumes are normal at rest and stress.   5. The extracardiac distribution of radioactivity is normal.   6. There was no previous study available to compare.       HPI:   patent feels weak with limited activity  No chest pain, Orthopnea, PND of heart failure symptoms.   Denies palpitations or fluttering in the chest  BP  Has been consistently elevated in the clinic but her pressures have been better at home and in the dialysis clinic per her.      HPI:   patent feels weak with limited activity  No chest pain, Orthopnea,  PND of heart failure symptoms.   Denies palpitations or fluttering in the chest  BP  Has been consistently elevated in the clinic but her pressures have been better at home and in the dialysis clinic per her.   Patient is sedentary and walks some.       Echo 2022  The left ventricle is mildly enlarged with moderate concentric hypertrophy and severely decreased systolic function.  The estimated ejection fraction is 25%.  Grade II left ventricular diastolic dysfunction.  Normal right ventricular size with normal right ventricular systolic function.  Moderate left atrial enlargement.  Moderate right atrial enlargement.  There is mild aortic valve stenosis.  Aortic valve area is 2.11 cm2; peak velocity is 2.11 m/s; mean gradient is 10 mmHg.  Moderate mitral regurgitation.  Moderate tricuspid regurgitation.  Elevated central venous pressure (15 mmHg).  The estimated PA systolic pressure is 60 mmHg.  There is pulmonary hypertension.  Small to moderate circumferential pericardial effusion. No tamponade        HPI:   WARREN on moderate actvity  NYHA IV  No chest pain, Orthopnea, PND of heart failure symptoms.   Denies palpitations or fluttering in the chest  Minimal weight gain     Saw Dr Barton 10/8/23  Consult received to deactivate the defibrillator.    I discussed the case with the patient's family member at the bedside.  The patient has metastatic renal cell carcinoma.  [employer] was contacted.  They will come by today to turn off the subcutaneous ICD.    4/10/24 Was in hospice but family took her out. Followed by vascular surgery for PAD and oncology for metastatic renal CA. Denies CP. Mild WARREN  BP controlled  EKG NSR NSSTT changes QTc 524    Review of Systems   Constitutional: Negative for decreased appetite.   HENT:  Negative for ear discharge.    Eyes:  Negative for blurred vision.   Respiratory:  Negative for hemoptysis.    Endocrine: Negative for polyphagia.   Hematologic/Lymphatic: Negative for adenopathy.    Skin:  Negative for color change.   Musculoskeletal:  Negative for joint swelling.   Genitourinary:  Negative for bladder incontinence.   Neurological:  Negative for brief paralysis.   Psychiatric/Behavioral:  Negative for hallucinations.    Allergic/Immunologic: Negative for hives.          Objective     Physical Exam  Constitutional:       Appearance: She is well-developed.   HENT:      Head: Normocephalic and atraumatic.   Eyes:      Conjunctiva/sclera: Conjunctivae normal.      Pupils: Pupils are equal, round, and reactive to light.   Cardiovascular:      Rate and Rhythm: Normal rate.      Pulses: Intact distal pulses.      Heart sounds: Normal heart sounds.   Pulmonary:      Effort: Pulmonary effort is normal.      Breath sounds: Normal breath sounds.   Abdominal:      General: Bowel sounds are normal.      Palpations: Abdomen is soft.   Musculoskeletal:         General: Normal range of motion.      Cervical back: Normal range of motion and neck supple.   Skin:     General: Skin is warm and dry.   Neurological:      Mental Status: She is alert and oriented to person, place, and time.            Assessment and Plan     1. Essential hypertension    2. Acute blood loss anemia    3. Metastatic renal cell carcinoma to intra-abdominal site    4. ESRD (end stage renal disease) on dialysis    5. Combined systolic and diastolic cardiac dysfunction    6. Cardiomyopathy, nonischemic    7. PAF (paroxysmal atrial fibrillation)    8. Nonrheumatic mitral valve regurgitation    9. Chronic combined systolic and diastolic congestive heart failure    10. ICD (implantable cardioverter-defibrillator) in place - SubQ    11. PAD (peripheral artery disease)    12. Critical limb ischemia of left lower extremity with gangrene    13. Aortic atherosclerosis    14. Malfunction of arteriovenous dialysis fistula, subsequent encounter        Plan:     Discussed reactivating SC AICD given that she is no longer in hospice - family declined  and wish to leave device deactivated  OV 3 months with echo  Continue Rx for NICM, CHF, PAD, HLD, ESRD, HTN    Advance Care Planning     Date: 04/10/2024  Patient did not wish or was not able to name a surrogate decision maker or provide an Advance Care Plan.

## 2024-04-11 LAB
OHS QRS DURATION: 106 MS
OHS QTC CALCULATION: 524 MS

## 2024-04-12 DIAGNOSIS — S52.501A CLOSED FRACTURE OF DISTAL ENDS OF RIGHT RADIUS AND ULNA, INITIAL ENCOUNTER: Primary | ICD-10-CM

## 2024-04-12 DIAGNOSIS — S52.601A CLOSED FRACTURE OF DISTAL ENDS OF RIGHT RADIUS AND ULNA, INITIAL ENCOUNTER: Primary | ICD-10-CM

## 2024-04-12 PROBLEM — R41.82 ALTERED MENTAL STATUS: Status: ACTIVE | Noted: 2024-04-12

## 2024-04-13 ENCOUNTER — HOSPITAL ENCOUNTER (INPATIENT)
Facility: HOSPITAL | Age: 59
LOS: 8 days | Discharge: HOME HEALTH | DRG: 871 | End: 2024-04-21
Attending: EMERGENCY MEDICINE | Admitting: HOSPITALIST
Payer: MEDICARE

## 2024-04-13 DIAGNOSIS — R40.0 SOMNOLENCE: ICD-10-CM

## 2024-04-13 DIAGNOSIS — Z99.2 ESRD (END STAGE RENAL DISEASE) ON DIALYSIS: ICD-10-CM

## 2024-04-13 DIAGNOSIS — I50.9 CHF (CONGESTIVE HEART FAILURE): ICD-10-CM

## 2024-04-13 DIAGNOSIS — R00.2 PALPITATIONS: ICD-10-CM

## 2024-04-13 DIAGNOSIS — R50.9 FEVER, UNKNOWN ORIGIN: Primary | ICD-10-CM

## 2024-04-13 DIAGNOSIS — D62 ACUTE BLOOD LOSS ANEMIA: ICD-10-CM

## 2024-04-13 DIAGNOSIS — N18.6 ESRD (END STAGE RENAL DISEASE) ON DIALYSIS: ICD-10-CM

## 2024-04-13 DIAGNOSIS — I70.262 CRITICAL LIMB ISCHEMIA OF LEFT LOWER EXTREMITY WITH GANGRENE: ICD-10-CM

## 2024-04-13 DIAGNOSIS — A41.9 SEPSIS: ICD-10-CM

## 2024-04-13 DIAGNOSIS — L89.620 PRESSURE INJURY OF LEFT HEEL, UNSTAGEABLE: ICD-10-CM

## 2024-04-13 DIAGNOSIS — G93.41 ACUTE METABOLIC ENCEPHALOPATHY: ICD-10-CM

## 2024-04-13 DIAGNOSIS — R41.82 ALTERED MENTAL STATUS, UNSPECIFIED ALTERED MENTAL STATUS TYPE: ICD-10-CM

## 2024-04-13 LAB
ALBUMIN SERPL BCP-MCNC: 3.5 G/DL (ref 3.5–5.2)
ALLENS TEST: ABNORMAL
ALLENS TEST: ABNORMAL
ALP SERPL-CCNC: 509 U/L (ref 55–135)
ALT SERPL W/O P-5'-P-CCNC: 22 U/L (ref 10–44)
ANION GAP SERPL CALC-SCNC: 18 MMOL/L (ref 8–16)
AST SERPL-CCNC: 44 U/L (ref 10–40)
BASOPHILS # BLD AUTO: 0.05 K/UL (ref 0–0.2)
BASOPHILS NFR BLD: 1.2 % (ref 0–1.9)
BILIRUB SERPL-MCNC: 1.2 MG/DL (ref 0.1–1)
BNP SERPL-MCNC: >4900 PG/ML (ref 0–99)
BUN SERPL-MCNC: 22 MG/DL (ref 6–20)
CALCIUM SERPL-MCNC: 10.2 MG/DL (ref 8.7–10.5)
CHLORIDE SERPL-SCNC: 102 MMOL/L (ref 95–110)
CO2 SERPL-SCNC: 19 MMOL/L (ref 23–29)
CREAT SERPL-MCNC: 3.7 MG/DL (ref 0.5–1.4)
CTP QC/QA: YES
DELSYS: ABNORMAL
DIFFERENTIAL METHOD BLD: ABNORMAL
EOSINOPHIL # BLD AUTO: 0.2 K/UL (ref 0–0.5)
EOSINOPHIL NFR BLD: 4.4 % (ref 0–8)
ERYTHROCYTE [DISTWIDTH] IN BLOOD BY AUTOMATED COUNT: 22.3 % (ref 11.5–14.5)
EST. GFR  (NO RACE VARIABLE): 14 ML/MIN/1.73 M^2
GLUCOSE SERPL-MCNC: 65 MG/DL (ref 70–110)
HCT VFR BLD AUTO: 42.4 % (ref 37–48.5)
HGB BLD-MCNC: 12.9 G/DL (ref 12–16)
IMM GRANULOCYTES # BLD AUTO: 0.02 K/UL (ref 0–0.04)
IMM GRANULOCYTES NFR BLD AUTO: 0.5 % (ref 0–0.5)
INR PPP: 1.1 (ref 0.8–1.2)
LACTATE SERPL-SCNC: 2.8 MMOL/L (ref 0.5–2.2)
LACTATE SERPL-SCNC: 3.5 MMOL/L (ref 0.5–2.2)
LDH SERPL L TO P-CCNC: 3.67 MMOL/L (ref 0.5–2.2)
LYMPHOCYTES # BLD AUTO: 0.8 K/UL (ref 1–4.8)
LYMPHOCYTES NFR BLD: 19.9 % (ref 18–48)
MAGNESIUM SERPL-MCNC: 2.4 MG/DL (ref 1.6–2.6)
MCH RBC QN AUTO: 28.2 PG (ref 27–31)
MCHC RBC AUTO-ENTMCNC: 30.4 G/DL (ref 32–36)
MCV RBC AUTO: 93 FL (ref 82–98)
MODE: ABNORMAL
MOLECULAR STREP A: NEGATIVE
MONOCYTES # BLD AUTO: 0.4 K/UL (ref 0.3–1)
MONOCYTES NFR BLD: 9.2 % (ref 4–15)
NEUTROPHILS # BLD AUTO: 2.7 K/UL (ref 1.8–7.7)
NEUTROPHILS NFR BLD: 64.8 % (ref 38–73)
NRBC BLD-RTO: 1 /100 WBC
PHOSPHATE SERPL-MCNC: 4 MG/DL (ref 2.7–4.5)
PLATELET # BLD AUTO: 152 K/UL (ref 150–450)
PMV BLD AUTO: 11.8 FL (ref 9.2–12.9)
POC MOLECULAR INFLUENZA A AGN: NEGATIVE
POC MOLECULAR INFLUENZA B AGN: NEGATIVE
POC PTINR: 1.4 (ref 0.9–1.2)
POC PTWBT: 16.7 SEC (ref 9.7–14.3)
POCT GLUCOSE: 106 MG/DL (ref 70–110)
POTASSIUM SERPL-SCNC: 3.9 MMOL/L (ref 3.5–5.1)
PROCALCITONIN SERPL IA-MCNC: 0.74 NG/ML
PROT SERPL-MCNC: 9.4 G/DL (ref 6–8.4)
PROTHROMBIN TIME: 11.9 SEC (ref 9–12.5)
RBC # BLD AUTO: 4.58 M/UL (ref 4–5.4)
SAMPLE: ABNORMAL
SAMPLE: ABNORMAL
SARS-COV-2 RDRP RESP QL NAA+PROBE: NEGATIVE
SITE: ABNORMAL
SITE: ABNORMAL
SODIUM SERPL-SCNC: 139 MMOL/L (ref 136–145)
TROPONIN I SERPL DL<=0.01 NG/ML-MCNC: 0.21 NG/ML (ref 0–0.03)
WBC # BLD AUTO: 4.12 K/UL (ref 3.9–12.7)

## 2024-04-13 PROCEDURE — 80053 COMPREHEN METABOLIC PANEL: CPT | Performed by: EMERGENCY MEDICINE

## 2024-04-13 PROCEDURE — 93010 ELECTROCARDIOGRAM REPORT: CPT | Mod: GW,,, | Performed by: INTERNAL MEDICINE

## 2024-04-13 PROCEDURE — 87635 SARS-COV-2 COVID-19 AMP PRB: CPT | Performed by: EMERGENCY MEDICINE

## 2024-04-13 PROCEDURE — 85025 COMPLETE CBC W/AUTO DIFF WBC: CPT | Performed by: EMERGENCY MEDICINE

## 2024-04-13 PROCEDURE — 63600175 PHARM REV CODE 636 W HCPCS: Performed by: EMERGENCY MEDICINE

## 2024-04-13 PROCEDURE — 85610 PROTHROMBIN TIME: CPT | Performed by: EMERGENCY MEDICINE

## 2024-04-13 PROCEDURE — 83605 ASSAY OF LACTIC ACID: CPT | Mod: 91 | Performed by: EMERGENCY MEDICINE

## 2024-04-13 PROCEDURE — 84484 ASSAY OF TROPONIN QUANT: CPT | Performed by: EMERGENCY MEDICINE

## 2024-04-13 PROCEDURE — 85610 PROTHROMBIN TIME: CPT

## 2024-04-13 PROCEDURE — 87040 BLOOD CULTURE FOR BACTERIA: CPT | Mod: 59 | Performed by: EMERGENCY MEDICINE

## 2024-04-13 PROCEDURE — 87502 INFLUENZA DNA AMP PROBE: CPT

## 2024-04-13 PROCEDURE — 93005 ELECTROCARDIOGRAM TRACING: CPT

## 2024-04-13 PROCEDURE — 25000003 PHARM REV CODE 250: Performed by: EMERGENCY MEDICINE

## 2024-04-13 PROCEDURE — 83880 ASSAY OF NATRIURETIC PEPTIDE: CPT | Performed by: EMERGENCY MEDICINE

## 2024-04-13 PROCEDURE — 87880 STREP A ASSAY W/OPTIC: CPT

## 2024-04-13 PROCEDURE — 94761 N-INVAS EAR/PLS OXIMETRY MLT: CPT

## 2024-04-13 PROCEDURE — 25000003 PHARM REV CODE 250: Performed by: STUDENT IN AN ORGANIZED HEALTH CARE EDUCATION/TRAINING PROGRAM

## 2024-04-13 PROCEDURE — 99285 EMERGENCY DEPT VISIT HI MDM: CPT | Mod: 25

## 2024-04-13 PROCEDURE — 63600175 PHARM REV CODE 636 W HCPCS: Performed by: HOSPITALIST

## 2024-04-13 PROCEDURE — 21400001 HC TELEMETRY ROOM

## 2024-04-13 PROCEDURE — 25000003 PHARM REV CODE 250: Performed by: HOSPITALIST

## 2024-04-13 PROCEDURE — 99900035 HC TECH TIME PER 15 MIN (STAT)

## 2024-04-13 PROCEDURE — 84145 PROCALCITONIN (PCT): CPT | Performed by: EMERGENCY MEDICINE

## 2024-04-13 PROCEDURE — 96374 THER/PROPH/DIAG INJ IV PUSH: CPT

## 2024-04-13 PROCEDURE — 83735 ASSAY OF MAGNESIUM: CPT | Performed by: EMERGENCY MEDICINE

## 2024-04-13 PROCEDURE — 84100 ASSAY OF PHOSPHORUS: CPT | Performed by: EMERGENCY MEDICINE

## 2024-04-13 PROCEDURE — 83605 ASSAY OF LACTIC ACID: CPT

## 2024-04-13 RX ORDER — ATORVASTATIN CALCIUM 40 MG/1
40 TABLET, FILM COATED ORAL DAILY
Status: DISCONTINUED | OUTPATIENT
Start: 2024-04-13 | End: 2024-04-21 | Stop reason: HOSPADM

## 2024-04-13 RX ORDER — LEVOTHYROXINE SODIUM 75 UG/1
75 TABLET ORAL
Status: DISCONTINUED | OUTPATIENT
Start: 2024-04-14 | End: 2024-04-21 | Stop reason: HOSPADM

## 2024-04-13 RX ORDER — ACETAMINOPHEN 325 MG/1
650 TABLET ORAL EVERY 6 HOURS PRN
Status: DISCONTINUED | OUTPATIENT
Start: 2024-04-13 | End: 2024-04-21 | Stop reason: HOSPADM

## 2024-04-13 RX ORDER — ASPIRIN 81 MG/1
81 TABLET ORAL DAILY
Status: DISCONTINUED | OUTPATIENT
Start: 2024-04-13 | End: 2024-04-21 | Stop reason: HOSPADM

## 2024-04-13 RX ORDER — DILTIAZEM HYDROCHLORIDE 5 MG/ML
10 INJECTION INTRAVENOUS
Status: COMPLETED | OUTPATIENT
Start: 2024-04-13 | End: 2024-04-13

## 2024-04-13 RX ORDER — MIDODRINE HYDROCHLORIDE 5 MG/1
10 TABLET ORAL 3 TIMES DAILY
Status: DISCONTINUED | OUTPATIENT
Start: 2024-04-13 | End: 2024-04-16

## 2024-04-13 RX ORDER — MUPIROCIN 20 MG/G
OINTMENT TOPICAL 2 TIMES DAILY
Status: DISPENSED | OUTPATIENT
Start: 2024-04-13 | End: 2024-04-18

## 2024-04-13 RX ORDER — METOPROLOL SUCCINATE 25 MG/1
25 TABLET, EXTENDED RELEASE ORAL DAILY
Status: DISCONTINUED | OUTPATIENT
Start: 2024-04-13 | End: 2024-04-14

## 2024-04-13 RX ORDER — METOPROLOL TARTRATE 25 MG/1
25 TABLET, FILM COATED ORAL ONCE
Status: COMPLETED | OUTPATIENT
Start: 2024-04-13 | End: 2024-04-13

## 2024-04-13 RX ORDER — OXYCODONE HYDROCHLORIDE 5 MG/1
5 TABLET ORAL EVERY 6 HOURS PRN
Status: DISCONTINUED | OUTPATIENT
Start: 2024-04-13 | End: 2024-04-16

## 2024-04-13 RX ORDER — FAMOTIDINE 20 MG/1
20 TABLET, FILM COATED ORAL DAILY
Status: DISCONTINUED | OUTPATIENT
Start: 2024-04-13 | End: 2024-04-21 | Stop reason: HOSPADM

## 2024-04-13 RX ORDER — ACETAMINOPHEN 500 MG
1000 TABLET ORAL
Status: COMPLETED | OUTPATIENT
Start: 2024-04-13 | End: 2024-04-13

## 2024-04-13 RX ADMIN — METOPROLOL SUCCINATE 25 MG: 25 TABLET, EXTENDED RELEASE ORAL at 02:04

## 2024-04-13 RX ADMIN — METOPROLOL TARTRATE 25 MG: 25 TABLET, FILM COATED ORAL at 07:04

## 2024-04-13 RX ADMIN — VANCOMYCIN HYDROCHLORIDE 1000 MG: 1 INJECTION, POWDER, LYOPHILIZED, FOR SOLUTION INTRAVENOUS at 01:04

## 2024-04-13 RX ADMIN — MIDODRINE HYDROCHLORIDE 10 MG: 5 TABLET ORAL at 03:04

## 2024-04-13 RX ADMIN — SODIUM CHLORIDE 250 ML: 9 INJECTION, SOLUTION INTRAVENOUS at 03:04

## 2024-04-13 RX ADMIN — ATORVASTATIN CALCIUM 40 MG: 40 TABLET, FILM COATED ORAL at 02:04

## 2024-04-13 RX ADMIN — ACETAMINOPHEN 1000 MG: 500 TABLET ORAL at 10:04

## 2024-04-13 RX ADMIN — MUPIROCIN: 20 OINTMENT TOPICAL at 08:04

## 2024-04-13 RX ADMIN — CEFEPIME 1 G: 1 INJECTION, POWDER, FOR SOLUTION INTRAMUSCULAR; INTRAVENOUS at 12:04

## 2024-04-13 RX ADMIN — ASPIRIN 81 MG: 81 TABLET, COATED ORAL at 02:04

## 2024-04-13 RX ADMIN — FAMOTIDINE 20 MG: 20 TABLET ORAL at 02:04

## 2024-04-13 RX ADMIN — MIDODRINE HYDROCHLORIDE 10 MG: 5 TABLET ORAL at 08:04

## 2024-04-13 RX ADMIN — APIXABAN 2.5 MG: 2.5 TABLET, FILM COATED ORAL at 08:04

## 2024-04-13 RX ADMIN — DILTIAZEM HYDROCHLORIDE 10 MG: 5 INJECTION INTRAVENOUS at 01:04

## 2024-04-13 RX ADMIN — SODIUM CHLORIDE 250 ML: 9 INJECTION, SOLUTION INTRAVENOUS at 04:04

## 2024-04-13 RX ADMIN — CEFEPIME 1 G: 1 INJECTION, POWDER, FOR SOLUTION INTRAMUSCULAR; INTRAVENOUS at 08:04

## 2024-04-13 NOTE — NURSING
Pt with only 70cc left of bolus. BP 98/69.  HR still tachy in 120s sustained. MD states monitor for now.

## 2024-04-13 NOTE — H&P
"  [facility]  [facility] Medicine  History & Physical    Patient Name: Eva Bloom  MRN: [id number]  Patient Class: IP- Inpatient  Admission Date: 4/13/2024  Attending Physician: José Miguel Cristina, *   Primary Care Provider: Sowmya Mccain MD         Patient information was obtained from patient and ER records.     Subjective:     Principal Problem:Sepsis    Chief Complaint:   Chief Complaint   Patient presents with    Palpitations     Pt to ED via POV with complaints of palpitations. Pt explains was at dialysis PTA receiving tx. Pt states "the dialysis nurse sent me here because my HR jumped to 120's during my tx."  at side reports "nurse was able to get her HR down a couple of times to 88, but then it went back up." Pt reports goes to dialysis Tues, Thurs, Sat. Finished entire tx, denies missing any days. Pt denies any complaints including chest pain and SOB. NAD        HPI: This is a 58 year old female, with a Anemia, Atrial fibrillation, CHF NYHA class II, chronic, systolic, CVA, ESRD on dialysis (Tues, Thurs, Sat) with completion of session today, Essential HTN, Hx of kidney cancer, Hyperparathyroidism, Myocardiopathy, and Prediabetes, who presents to the ED complaining of Palpitations, symptoms onset PTA. Patient reports cough, sleep disturbance, and generalized pruritus. Patient states she was at dialysis PTA and receiving dialysis when her heart rate jumped into the 120's and the dialysis nurses instructed her to present to the ER. Patient also states she has been on dialysis for 8 years and that she does not make urine. Patient further states she is on 2.5 mg of Eliquis. Additional history obtained from independent historian: patient's  states that in July last year she had a spot on her left foot and had a line inserted. Patient denies Hx of irregular heart rate or MI. Patient also denies rhinorrhea, sore throat, abdominal pain, emesis, diarrhea, or other associated " symptoms. No other alleviating or exacerbating factors. This is the extent of the patient's complaints in the ED.patient is febrile in .8,tachycardic 120.sepsis likely  duo to l;ine infection,patient has been started on broad  spectrum IV Abx,blood culture is pending.    Past Medical History:   Diagnosis Date    Anemia     Anticoagulant long-term use     Atrial fibrillation     Bronchitis 03/01/2017    Cancer 2016    kidney cancer    CHF (congestive heart failure), NYHA class II, chronic, systolic     CMV (cytomegalovirus) antibody positive     CVA (cerebral vascular accident) 1/3/2024    Encounter for blood transfusion     ESRD (end stage renal disease)     Essential hypertension 09/23/2015    H/O herpes simplex type 2 infection     Herpes simplex type 1 antibody positive     History of kidney cancer     s/p left nephrectomy 1/2016    Hyperparathyroidism, unspecified     Hyperuricemia without signs of inflammatory arthritis and tophaceous disease     Kidney stones     LGSIL (low grade squamous intraepithelial dysplasia)     Myocardiopathy 07/21/2017    Prediabetes     Proteinuria     Renal disorder     Thyroid nodule     Urate nephropathy        Past Surgical History:   Procedure Laterality Date    BREAST CYST EXCISION      COLONOSCOPY N/A 11/12/2015    COLONOSCOPY N/A 03/12/2021    Procedure: COLONOSCOPY;  Surgeon: Brendon Lanier MD;  Location: [facility];  Service: Endoscopy;  Laterality: N/A;  covid test 3/9, labs prior, prep instr mailed -ml    EXCISION OF ARTERIOVENOUS FISTULA Left 09/27/2023    Procedure: EXCISION, AV FISTULA;  Surgeon: FARIDEH Muñoz III, MD;  Location: [facility] OR [address] [place] [address];  Service: Vascular;  Laterality: Left;  LUE AV graft excision    INSERTION OF BIVENTRICULAR IMPLANTABLE CARDIOVERTER-DEFIBRILLATOR (ICD)  04/2021    INSERTION OF TUNNELED CENTRAL VENOUS CATHETER (CVC) WITH SUBCUTANEOUS PORT N/A 01/25/2023    Procedure: INSERTION, DUAL LUMEN CATHETER WITH PORT, WITH IMAGING  GUIDANCE;  Surgeon: FARIDEH Muñoz III, MD;  [facility];  Service: Peripheral Vascular;  Laterality: N/A;  possinble permcath placment    NEPHRECTOMY-LAPAROSCOPIC Left 01/12/2016    PERCUTANEOUS TRANSLUMINAL ANGIOPLASTY OF ARTERIOVENOUS FISTULA Left 04/19/2023    Procedure: PTA, AV FISTULA;  Surgeon: FARIDEH Muñoz III, MD;  Location: [facility] CATH LAB;  Service: Peripheral Vascular;  Laterality: Left;    PERITONEAL CATHETER INSERTION      Permacath insertion  01/12/2017    PLACEMENT OF ARTERIOVENOUS GRAFT  12/28/2022    Procedure: INSERTION, GRAFT, ARTERIOVENOUS;  Surgeon: FARIDEH Muñoz III, MD;  [facility];  Service: Peripheral Vascular;;    REMOVAL, GRAFT, ARTERIOVENOUS, UPPER EXTREMITY Left 01/25/2023    Procedure: REMOVAL, GRAFT, ARTERIOVENOUS, UPPER EXTREMITY;  Surgeon: FARIDEH Muñoz III, MD;  [facility];  Service: Peripheral Vascular;  Laterality: Left;    REVISION OF ARTERIOVENOUS FISTULA Left 05/01/2023    Procedure: REVISION, AV FISTULA;  Surgeon: FARIDEH Muñoz III, MD;  [facility];  Service: Peripheral Vascular;  Laterality: Left;  LUE AVG revision    REVISION OF PROCEDURE INVOLVING ARTERIOVENOUS GRAFT Left 12/28/2022    Procedure: REVISION, PROCEDURE INVOLVING ARTERIOVENOUS GRAFT;  Surgeon: FARIDEH Muñoz III, MD;  [facility];  Service: Peripheral Vascular;  Laterality: Left;    REVISION OF PROCEDURE INVOLVING ARTERIOVENOUS GRAFT Left 07/21/2023    Procedure: LEFT ARM ARTERIOVENOUS GRAFT EXCISION  AND LIGATION;  Surgeon: Obi Werner MD;  Location: [facility];  Service: Vascular;  Laterality: Left;  Left AVG excision and revision with interposition    SALPINGOOPHORECTOMY Right 2016    KJB---DAVINCI    TONSILLECTOMY      TUBAL LIGATION         Review of patient's allergies indicates:   Allergen Reactions    Carvedilol Other (See Comments)     Nausea/vomiting    Allopurinol      Other reaction(s): abnormal  transaminases       Current Facility-Administered Medications   Medication Dose Route Frequency Provider Last Rate Last Admin    acetaminophen tablet 650 mg  650 mg Oral Q6H PRN José Miguel Cristina MD        apixaban tablet 2.5 mg  2.5 mg Oral BID José Miguel Cristina MD        aspirin EC tablet 81 mg  81 mg Oral Daily José Miguel Cristina MD        atorvastatin tablet 40 mg  40 mg Oral Daily José Miguel Cristina MD        ceFEPIme (MAXIPIME) 1 g in dextrose 5 % in water (D5W) 100 mL IVPB (MB+)  1 g Intravenous Q12H José Miguel Cristina MD        diltiaZEM injection 10 mg  10 mg Intravenous ED 1 Time Artis Boyd MD        famotidine tablet 20 mg  20 mg Oral Daily José Miguel Cristina MD        [START ON 4/14/2024] levothyroxine tablet 75 mcg  75 mcg Oral Before breakfast José Miguel Cristina MD        metoprolol succinate (TOPROL-XL) 24 hr tablet 25 mg  25 mg Oral Daily José Miguel Cristina MD        mupirocin 2 % ointment   Nasal BID José Miguel Cristina MD        oxyCODONE immediate release tablet 5 mg  5 mg Oral Q6H PRN José Miguel Cristina MD        vancomycin (VANCOCIN) 1,000 mg in dextrose 5 % (D5W) 250 mL IVPB (Vial-Mate)  20 mg/kg Intravenous Once Artis Boyd MD        vancomycin - pharmacy to dose   Intravenous pharmacy to manage frequency Artis Boyd MD         Current Outpatient Medications   Medication Sig Dispense Refill    acetaminophen (TYLENOL) 500 MG tablet Take 500 mg by mouth every 6 (six) hours as needed for Pain.      acetaminophen (TYLENOL) 500 MG tablet Take 1 tablet (500 mg total) by mouth every 4 (four) hours as needed for Pain or Temperature greater than (100.5 or greater). 30 tablet 0    albuterol-ipratropium (DUO-NEB) 2.5 mg-0.5 mg/3 mL nebulizer solution Take by nebulization.      apixaban (ELIQUIS) 2.5 mg Tab Take 1 tablet (2.5 mg total) by mouth 2 (two) times daily. 60 tablet 11    aspirin (ECOTRIN) 81 MG EC tablet Take 1  tablet (81 mg total) by mouth once daily. 90 tablet 3    atorvastatin (LIPITOR) 40 MG tablet Take 1 tablet (40 mg total) by mouth once daily. 90 tablet 3    bisacodyL (DULCOLAX) 10 mg Supp Place 10 mg rectally.      cabozantinib (CABOMETYX) 60 mg Tab TAKE ONE TABLET BY MOUTH ONCE DAILY AT THE SAME TIME ON AN EMPTY STOMACH AT LEAST 1 HOUR BEFORE OR 2 HOURS AFTER EATING. AVOID GRAPEFRUIT PRODUCTS 30 tablet 4    cyclobenzaprine (FLEXERIL) 5 MG tablet TAKE 1 TABLET BY MOUTH DAILY AS NEEDED FOR MUSCLE SPASMS. 30 tablet 1    epoetin david-epbx (RETACRIT INJ) Epoetin david - epbx (Retacrit)      hyoscyamine (LEVSIN/SL) 0.125 mg Subl Place under the tongue.      lanthanum (FOSRENOL) 1000 MG chewable tablet Take 1 tablet by mouth.      levothyroxine (SYNTHROID) 75 MCG tablet TAKE 1 TABLET BY MOUTH EVERY DAY 90 tablet 3    lidocaine-prilocaine (EMLA) cream APPLY ATLEAST 30 MINUTES BEFORE TREATMENT 3 TIMES A WEEK 30 g 11    LORAZEPAM INTENSOL 2 mg/mL Conc Take by mouth.      metoprolol succinate (TOPROL-XL) 25 MG 24 hr tablet Take 1 tablet (25 mg total) by mouth once daily. 30 tablet 11    mirtazapine (REMERON) 7.5 MG Tab Take 1 tablet (7.5 mg total) by mouth every evening. 30 tablet 11    mv,Ca,min-folic acid-vit K1 (ONE-A-DAY WOMEN'S 50 PLUS) 400-20 mcg Tab Take 1 tablet by mouth once daily.      naloxone (NARCAN) 1 mg/mL injection 2 mg (1 mg per nostril) by Nasal route as needed for opioid overdose; may repeat in 3 to 5 minutes if not effective. Call 911 2 mL 11    nystatin (MYCOSTATIN) cream SMARTSIG:sparingly Topical 2-4 Times Daily PRN      ondansetron (ZOFRAN-ODT) 4 MG TbDL Take by mouth.      oxyCODONE (ROXICODONE) 5 MG immediate release tablet Take 1 tablet (5 mg total) by mouth every 6 (six) hours as needed for Pain. 11 tablet 0    pantoprazole (PROTONIX) 40 MG tablet Take 1 tablet (40 mg total) by mouth 2 (two) times daily. 60 tablet 11    traMADoL (ULTRAM) 50 mg tablet Take 1 tablet (50 mg total) by mouth every 12  (twelve) hours as needed for Pain. 30 each 1     Facility-Administered Medications Ordered in Other Encounters   Medication Dose Route Frequency Provider Last Rate Last Admin    0.9%  NaCl infusion   Intravenous Continuous Soni Bhatti MD   New Bag at 07/21/23 1116     Family History       Problem Relation (Age of Onset)    Cataracts Maternal Aunt    Diabetes Father, Maternal Grandfather    Heart disease Sister    Hypertension Mother    Kidney disease Father    No Known Problems Sister, Sister, Brother, Brother, Maternal Uncle, Paternal Aunt, Paternal Uncle, Maternal Grandmother, Paternal Grandmother, Paternal Grandfather, Son, Son, Other          Tobacco Use    Smoking status: Never     Passive exposure: Never    Smokeless tobacco: Never   Substance and Sexual Activity    Alcohol use: No    Drug use: Yes     Types: Hydrocodone    Sexual activity: Not Currently     Review of Systems   Constitutional:  Positive for activity change, appetite change and fever.   HENT:  Negative for congestion and dental problem.    Eyes:  Negative for discharge and itching.   Respiratory:  Negative for apnea and chest tightness.    Cardiovascular:  Positive for palpitations. Negative for chest pain.   Gastrointestinal:  Negative for abdominal distention and abdominal pain.   Endocrine: Negative for cold intolerance and heat intolerance.   Genitourinary:  Negative for difficulty urinating and dyspareunia.   Skin:  Negative for color change and pallor.   Neurological:  Negative for dizziness and facial asymmetry.   Hematological:  Negative for adenopathy. Does not bruise/bleed easily.   Psychiatric/Behavioral:  Negative for agitation and behavioral problems.      Objective:     Vital Signs (Most Recent):  Temp: 97.5 °F (36.4 °C) (04/13/24 1136)  Pulse: (!) 125 (04/13/24 1322)  Resp: (!) 25 (04/13/24 1252)  BP: 92/64 (04/13/24 1322)  SpO2: 97 % (04/13/24 1322) Vital Signs (24h Range):  Temp:  [97.5 °F (36.4 °C)-100.8 °F (38.2 °C)] 97.5  °F (36.4 °C)  Pulse:  [122-133] 125  Resp:  [18-29] 25  SpO2:  [95 %-100 %] 97 %  BP: ()/(63-81) 92/64     Weight: 46.3 kg (102 lb)  Body mass index is 17.51 kg/m².     Physical Exam  HENT:      Head: Normocephalic.      Mouth/Throat:      Mouth: Mucous membranes are moist.   Eyes:      Extraocular Movements: Extraocular movements intact.      Pupils: Pupils are equal, round, and reactive to light.   Cardiovascular:      Rate and Rhythm: Regular rhythm. Tachycardia present.   Pulmonary:      Effort: No respiratory distress.      Breath sounds: No wheezing.   Abdominal:      General: There is no distension.   Musculoskeletal:         General: No swelling or deformity.      Cervical back: Normal range of motion.   Skin:     Coloration: Skin is not jaundiced.      Findings: No bruising.   Neurological:      Mental Status: She is alert and oriented to person, place, and time.      Cranial Nerves: No cranial nerve deficit.      Motor: No weakness.   Psychiatric:         Mood and Affect: Mood normal.         Behavior: Behavior normal.              CRANIAL NERVES     CN III, IV, VI   Pupils are equal, round, and reactive to light.       Significant Labs: All pertinent labs within the past 24 hours have been reviewed.  BMP:   Recent Labs   Lab 04/13/24  1104   GLU 65*      K 3.9      CO2 19*   BUN 22*   CREATININE 3.7*   CALCIUM 10.2   MG 2.4     CBC:   Recent Labs   Lab 04/13/24  1104   WBC 4.12   HGB 12.9   HCT 42.4        CMP:   Recent Labs   Lab 04/13/24  1104      K 3.9      CO2 19*   GLU 65*   BUN 22*   CREATININE 3.7*   CALCIUM 10.2   PROT 9.4*   ALBUMIN 3.5   BILITOT 1.2*   ALKPHOS 509*   AST 44*   ALT 22   ANIONGAP 18*       Significant Imaging: I have reviewed all pertinent imaging results/findings within the past 24 hours.  Assessment/Plan:     * Sepsis  This patient does have evidence of infective focus  My overall impression is sepsis.  Source:  line infection   Antibiotics  "given-   Antibiotics (72h ago, onward)      Start     Stop Route Frequency Ordered    04/13/24 2100  mupirocin 2 % ointment         04/18/24 2059 Nasl 2 times daily 04/13/24 1319    04/13/24 2000  ceFEPIme (MAXIPIME) 1 g in dextrose 5 % in water (D5W) 100 mL IVPB (MB+)         -- IV Every 12 hours (non-standard times) 04/13/24 1330    04/13/24 1230  vancomycin (VANCOCIN) 1,000 mg in dextrose 5 % (D5W) 250 mL IVPB (Vial-Mate)         -- IV Once 04/13/24 1121    04/13/24 1217  vancomycin - pharmacy to dose  (vancomycin IVPB (PEDS and ADULTS))        Placed in "And" Linked Group    -- IV pharmacy to manage frequency 04/13/24 1117          Latest lactate reviewed-  Recent Labs   Lab 04/13/24  1104 04/13/24  1107   LACTATE 3.5*  --    POCLAC  --  3.67*     Organ dysfunction indicated by Acute kidney injury    Fluid challenge Ideal Body Weight- The patient's ideal body weight is Ideal body weight: 54.7 kg (120 lb 9.5 oz) which will be used to calculate fluid bolus of 30 ml/kg for treatment of septic shock.      Post- resuscitation assessment Yes Perfusion exam was performed within 6 hours of septic shock presentation after bolus shows Adequate tissue perfusion assessed by non-invasive monitoring       Will Not start Pressors- Levophed for MAP of 65  Source control achieved by: IV Abx    Severe protein-calorie malnutrition  Nutrition consulted. Most recent weight and BMI monitored-     Measurements:  Wt Readings from Last 1 Encounters:   04/13/24 46.3 kg (102 lb)   Body mass index is 17.51 kg/m².    Patient has been screened and assessed by RD.    Malnutrition Type:  Context:    Level:      Malnutrition Characteristic Summary:       Interventions/Recommendations (treatment strategy):         Hypothyroidism  On synthroid.      PAF (paroxysmal atrial fibrillation)   On OAC and BB.    ICD (implantable cardioverter-defibrillator) in place - SubQ  Will monitor.      Chronic combined systolic and diastolic congestive heart " failure  Patient is identified as having Combined Systolic and Diastolic heart failure that is Chronic. CHF is currently controlled. Latest ECHO performed and demonstrates- Results for orders placed during the hospital encounter of 05/08/23    Echo Saline Bubble? No    Interpretation Summary  · The left ventricle is normal in size with mild eccentric hypertrophy and severely decreased systolic function.  · The estimated ejection fraction is 23%.  · There is severe left ventricular global hypokinesis.  · Grade II left ventricular diastolic dysfunction.  · Moderate left atrial enlargement.  · Normal right ventricular size with normal right ventricular systolic function.  · Moderate right atrial enlargement.  · There is mild aortic valve stenosis.  · Aortic valve area is 1.58 cm2; peak velocity is 2.01 m/s; mean gradient is 8 mmHg.  · Moderate mitral regurgitation.  · Moderate to severe tricuspid regurgitation.  · Mild pulmonic regurgitation.  · Elevated central venous pressure (15 mmHg).  · The estimated PA systolic pressure is 47 mmHg.  · There is mild-moderate pulmonary hypertension.  · Moderate posterolateral pericardial effusion. Trivial under the RA  . Continue Beta Blocker and monitor clinical status closely. Monitor on telemetry. Patient is off CHF pathway.  Monitor strict Is&Os and daily weights.  Place on fluid restriction of 1.5 L. Cardiology has not been consulted. Continue to stress to patient importance of self efficacy and  on diet for CHF. Last BNP reviewed- and noted below   Recent Labs   Lab 04/13/24  1104   BNP >4,900*       Pulmonary hypertension  Fluid removal with HD.      Essential hypertension  On hold duo to sepsis.      VTE Risk Mitigation (From admission, onward)           Ordered     apixaban tablet 2.5 mg  2 times daily         04/13/24 1330                                    José Miguel Cristina MD  Department of Hospital Medicine  [facility] - Emergency Dept

## 2024-04-13 NOTE — ADMISSIONCARE
AdmissionCare    Guideline: Sepsis (and Other Febrile Illness without Focal Infection) - INPT, Inpatient    Based on the indications selected for the patient, the bed status of Inpatient was determined to be MET    The following indications were selected as present at the time of evaluation of the patient:      - Hemodynamic instability, as indicated by 1 or more of the following:    - Vital sign abnormality not readily corrected by appropriate treatment, as indicated by 1 or more of the following:     - Tachycardia that persists despite appropriate treatment (eg, volume repletion, treatment of pain, treatment of underlying cause)    AdmissionCare documentation entered by: Sergio Degroot    [facility], 27th edition, Copyright © 2023 [facility], [facility] All Rights Reserved.  [id number]:[id number]:00

## 2024-04-13 NOTE — SUBJECTIVE & OBJECTIVE
Past Medical History:   Diagnosis Date    Anemia     Anticoagulant long-term use     Atrial fibrillation     Bronchitis 03/01/2017    Cancer 2016    kidney cancer    CHF (congestive heart failure), NYHA class II, chronic, systolic     CMV (cytomegalovirus) antibody positive     CVA (cerebral vascular accident) 1/3/2024    Encounter for blood transfusion     ESRD (end stage renal disease)     Essential hypertension 09/23/2015    H/O herpes simplex type 2 infection     Herpes simplex type 1 antibody positive     History of kidney cancer     s/p left nephrectomy 1/2016    Hyperparathyroidism, unspecified     Hyperuricemia without signs of inflammatory arthritis and tophaceous disease     Kidney stones     LGSIL (low grade squamous intraepithelial dysplasia)     Myocardiopathy 07/21/2017    Prediabetes     Proteinuria     Renal disorder     Thyroid nodule     Urate nephropathy        Past Surgical History:   Procedure Laterality Date    BREAST CYST EXCISION      COLONOSCOPY N/A 11/12/2015    COLONOSCOPY N/A 03/12/2021    Procedure: COLONOSCOPY;  Surgeon: Brendon Lanier MD;  Location: [facility];  Service: Endoscopy;  Laterality: N/A;  covid test 3/9, labs prior, prep instr mailed -ml    EXCISION OF ARTERIOVENOUS FISTULA Left 09/27/2023    Procedure: EXCISION, AV FISTULA;  Surgeon: FARIDEH Muñoz III, MD;  [facility];  Service: Vascular;  Laterality: Left;  LUE AV graft excision    INSERTION OF BIVENTRICULAR IMPLANTABLE CARDIOVERTER-DEFIBRILLATOR (ICD)  04/2021    INSERTION OF TUNNELED CENTRAL VENOUS CATHETER (CVC) WITH SUBCUTANEOUS PORT N/A 01/25/2023    Procedure: INSERTION, DUAL LUMEN CATHETER WITH PORT, WITH IMAGING GUIDANCE;  Surgeon: FARIDEH Muñoz III, MD;  [facility];  Service: Peripheral Vascular;  Laterality: N/A;  possinble permcath placment    NEPHRECTOMY-LAPAROSCOPIC Left 01/12/2016    PERCUTANEOUS TRANSLUMINAL ANGIOPLASTY OF ARTERIOVENOUS FISTULA Left 04/19/2023     Procedure: PTA, AV FISTULA;  Surgeon: FARIDEH Muñoz III, MD;  Location: [facility] CATH LAB;  Service: Peripheral Vascular;  Laterality: Left;    PERITONEAL CATHETER INSERTION      Permacath insertion  01/12/2017    PLACEMENT OF ARTERIOVENOUS GRAFT  12/28/2022    Procedure: INSERTION, GRAFT, ARTERIOVENOUS;  Surgeon: FARIDEH Muñoz III, MD;  Location: [facility] OR [facility] FLR;  Service: Peripheral Vascular;;    REMOVAL, GRAFT, ARTERIOVENOUS, UPPER EXTREMITY Left 01/25/2023    Procedure: REMOVAL, GRAFT, ARTERIOVENOUS, UPPER EXTREMITY;  Surgeon: FARIDEH Muñoz III, MD;  [facility];  Service: Peripheral Vascular;  Laterality: Left;    REVISION OF ARTERIOVENOUS FISTULA Left 05/01/2023    Procedure: REVISION, AV FISTULA;  Surgeon: FARIDEH Muñoz III, MD;  [facility];  Service: Peripheral Vascular;  Laterality: Left;  LUE AVG revision    REVISION OF PROCEDURE INVOLVING ARTERIOVENOUS GRAFT Left 12/28/2022    Procedure: REVISION, PROCEDURE INVOLVING ARTERIOVENOUS GRAFT;  Surgeon: FARIDEH Muñoz III, MD;  [facility];  Service: Peripheral Vascular;  Laterality: Left;    REVISION OF PROCEDURE INVOLVING ARTERIOVENOUS GRAFT Left 07/21/2023    Procedure: LEFT ARM ARTERIOVENOUS GRAFT EXCISION  AND LIGATION;  Surgeon: Obi Werner MD;  Location: [facility];  Service: Vascular;  Laterality: Left;  Left AVG excision and revision with interposition    SALPINGOOPHORECTOMY Right 2016    KJB---DAVINCI    TONSILLECTOMY      TUBAL LIGATION         Review of patient's allergies indicates:   Allergen Reactions    Carvedilol Other (See Comments)     Nausea/vomiting    Allopurinol      Other reaction(s): abnormal transaminases       Current Facility-Administered Medications   Medication Dose Route Frequency Provider Last Rate Last Admin    acetaminophen tablet 650 mg  650 mg Oral Q6H PRN José Miguel Cristina MD        apixaban tablet 2.5 mg  2.5 mg Oral BID José Miguel Cristina MD         aspirin EC tablet 81 mg  81 mg Oral Daily José Miguel Cristina MD        atorvastatin tablet 40 mg  40 mg Oral Daily José Miguel Cristina MD        ceFEPIme (MAXIPIME) 1 g in dextrose 5 % in water (D5W) 100 mL IVPB (MB+)  1 g Intravenous Q12H José Miguel Cristina MD        diltiaZEM injection 10 mg  10 mg Intravenous ED 1 Time Artis Boyd MD        famotidine tablet 20 mg  20 mg Oral Daily José Miguel Cristina MD        [START ON 4/14/2024] levothyroxine tablet 75 mcg  75 mcg Oral Before breakfast José Miguel Cristina MD        metoprolol succinate (TOPROL-XL) 24 hr tablet 25 mg  25 mg Oral Daily José Miguel Cristina MD        mupirocin 2 % ointment   Nasal BID José Miguel Cristina MD        oxyCODONE immediate release tablet 5 mg  5 mg Oral Q6H PRN José Miguel Cristina MD        vancomycin (VANCOCIN) 1,000 mg in dextrose 5 % (D5W) 250 mL IVPB (Vial-Mate)  20 mg/kg Intravenous Once Artis Boyd MD        vancomycin - pharmacy to dose   Intravenous pharmacy to manage frequency Artis Boyd MD         Current Outpatient Medications   Medication Sig Dispense Refill    acetaminophen (TYLENOL) 500 MG tablet Take 500 mg by mouth every 6 (six) hours as needed for Pain.      acetaminophen (TYLENOL) 500 MG tablet Take 1 tablet (500 mg total) by mouth every 4 (four) hours as needed for Pain or Temperature greater than (100.5 or greater). 30 tablet 0    albuterol-ipratropium (DUO-NEB) 2.5 mg-0.5 mg/3 mL nebulizer solution Take by nebulization.      apixaban (ELIQUIS) 2.5 mg Tab Take 1 tablet (2.5 mg total) by mouth 2 (two) times daily. 60 tablet 11    aspirin (ECOTRIN) 81 MG EC tablet Take 1 tablet (81 mg total) by mouth once daily. 90 tablet 3    atorvastatin (LIPITOR) 40 MG tablet Take 1 tablet (40 mg total) by mouth once daily. 90 tablet 3    bisacodyL (DULCOLAX) 10 mg Supp Place 10 mg rectally.      cabozantinib (CABOMETYX) 60 mg Tab TAKE ONE TABLET BY MOUTH ONCE DAILY AT  THE SAME TIME ON AN EMPTY STOMACH AT LEAST 1 HOUR BEFORE OR 2 HOURS AFTER EATING. AVOID GRAPEFRUIT PRODUCTS 30 tablet 4    cyclobenzaprine (FLEXERIL) 5 MG tablet TAKE 1 TABLET BY MOUTH DAILY AS NEEDED FOR MUSCLE SPASMS. 30 tablet 1    epoetin david-epbx (RETACRIT INJ) Epoetin david - epbx (Retacrit)      hyoscyamine (LEVSIN/SL) 0.125 mg Subl Place under the tongue.      lanthanum (FOSRENOL) 1000 MG chewable tablet Take 1 tablet by mouth.      levothyroxine (SYNTHROID) 75 MCG tablet TAKE 1 TABLET BY MOUTH EVERY DAY 90 tablet 3    lidocaine-prilocaine (EMLA) cream APPLY ATLEAST 30 MINUTES BEFORE TREATMENT 3 TIMES A WEEK 30 g 11    LORAZEPAM INTENSOL 2 mg/mL Conc Take by mouth.      metoprolol succinate (TOPROL-XL) 25 MG 24 hr tablet Take 1 tablet (25 mg total) by mouth once daily. 30 tablet 11    mirtazapine (REMERON) 7.5 MG Tab Take 1 tablet (7.5 mg total) by mouth every evening. 30 tablet 11    mv,Ca,min-folic acid-vit K1 (ONE-A-DAY WOMEN'S 50 PLUS) 400-20 mcg Tab Take 1 tablet by mouth once daily.      naloxone (NARCAN) 1 mg/mL injection 2 mg (1 mg per nostril) by Nasal route as needed for opioid overdose; may repeat in 3 to 5 minutes if not effective. Call 911 2 mL 11    nystatin (MYCOSTATIN) cream SMARTSIG:sparingly Topical 2-4 Times Daily PRN      ondansetron (ZOFRAN-ODT) 4 MG TbDL Take by mouth.      oxyCODONE (ROXICODONE) 5 MG immediate release tablet Take 1 tablet (5 mg total) by mouth every 6 (six) hours as needed for Pain. 11 tablet 0    pantoprazole (PROTONIX) 40 MG tablet Take 1 tablet (40 mg total) by mouth 2 (two) times daily. 60 tablet 11    traMADoL (ULTRAM) 50 mg tablet Take 1 tablet (50 mg total) by mouth every 12 (twelve) hours as needed for Pain. 30 each 1     Facility-Administered Medications Ordered in Other Encounters   Medication Dose Route Frequency Provider Last Rate Last Admin    0.9%  NaCl infusion   Intravenous Continuous Soni Bhatti MD   New Bag at 07/21/23 1116     Family History        Problem Relation (Age of Onset)    Cataracts Maternal Aunt    Diabetes Father, Maternal Grandfather    Heart disease Sister    Hypertension Mother    Kidney disease Father    No Known Problems Sister, Sister, Brother, Brother, Maternal Uncle, Paternal Aunt, Paternal Uncle, Maternal Grandmother, Paternal Grandmother, Paternal Grandfather, Son, Son, Other          Tobacco Use    Smoking status: Never     Passive exposure: Never    Smokeless tobacco: Never   Substance and Sexual Activity    Alcohol use: No    Drug use: Yes     Types: Hydrocodone    Sexual activity: Not Currently     Review of Systems   Constitutional:  Positive for activity change, appetite change and fever.   HENT:  Negative for congestion and dental problem.    Eyes:  Negative for discharge and itching.   Respiratory:  Negative for apnea and chest tightness.    Cardiovascular:  Positive for palpitations. Negative for chest pain.   Gastrointestinal:  Negative for abdominal distention and abdominal pain.   Endocrine: Negative for cold intolerance and heat intolerance.   Genitourinary:  Negative for difficulty urinating and dyspareunia.   Skin:  Negative for color change and pallor.   Neurological:  Negative for dizziness and facial asymmetry.   Hematological:  Negative for adenopathy. Does not bruise/bleed easily.   Psychiatric/Behavioral:  Negative for agitation and behavioral problems.      Objective:     Vital Signs (Most Recent):  Temp: 97.5 °F (36.4 °C) (04/13/24 1136)  Pulse: (!) 125 (04/13/24 1322)  Resp: (!) 25 (04/13/24 1252)  BP: 92/64 (04/13/24 1322)  SpO2: 97 % (04/13/24 1322) Vital Signs (24h Range):  Temp:  [97.5 °F (36.4 °C)-100.8 °F (38.2 °C)] 97.5 °F (36.4 °C)  Pulse:  [122-133] 125  Resp:  [18-29] 25  SpO2:  [95 %-100 %] 97 %  BP: ()/(63-81) 92/64     Weight: 46.3 kg (102 lb)  Body mass index is 17.51 kg/m².     Physical Exam  HENT:      Head: Normocephalic.      Mouth/Throat:      Mouth: Mucous membranes are moist.   Eyes:       Extraocular Movements: Extraocular movements intact.      Pupils: Pupils are equal, round, and reactive to light.   Cardiovascular:      Rate and Rhythm: Regular rhythm. Tachycardia present.   Pulmonary:      Effort: No respiratory distress.      Breath sounds: No wheezing.   Abdominal:      General: There is no distension.   Musculoskeletal:         General: No swelling or deformity.      Cervical back: Normal range of motion.   Skin:     Coloration: Skin is not jaundiced.      Findings: No bruising.   Neurological:      Mental Status: She is alert and oriented to person, place, and time.      Cranial Nerves: No cranial nerve deficit.      Motor: No weakness.   Psychiatric:         Mood and Affect: Mood normal.         Behavior: Behavior normal.              CRANIAL NERVES     CN III, IV, VI   Pupils are equal, round, and reactive to light.       Significant Labs: All pertinent labs within the past 24 hours have been reviewed.  BMP:   Recent Labs   Lab 04/13/24  1104   GLU 65*      K 3.9      CO2 19*   BUN 22*   CREATININE 3.7*   CALCIUM 10.2   MG 2.4     CBC:   Recent Labs   Lab 04/13/24  1104   WBC 4.12   HGB 12.9   HCT 42.4        CMP:   Recent Labs   Lab 04/13/24  1104      K 3.9      CO2 19*   GLU 65*   BUN 22*   CREATININE 3.7*   CALCIUM 10.2   PROT 9.4*   ALBUMIN 3.5   BILITOT 1.2*   ALKPHOS 509*   AST 44*   ALT 22   ANIONGAP 18*       Significant Imaging: I have reviewed all pertinent imaging results/findings within the past 24 hours.

## 2024-04-13 NOTE — PROGRESS NOTES
"Pharmacokinetic Initial Assessment: IV Vancomycin    Assessment/Plan:    Initiate intravenous vancomycin with loading dose of 1000 mg once with subsequent doses when random concentrations are less than 20 mcg/mL  Desired empiric serum trough concentration is 10 to 20 mcg/mL  Draw vancomycin random level on 4/14/2024 at 04:00.  Pharmacy will continue to follow and monitor vancomycin.      Please contact pharmacy at extension [phone] with any questions regarding this assessment.     Thank you for the consult,   Allegra Wolf       Patient brief summary:  Eva Bloom is a 58 y.o. female initiated on antimicrobial therapy with IV Vancomycin for treatment of suspected sepsis    Drug Allergies:   Review of patient's allergies indicates:   Allergen Reactions    Carvedilol Other (See Comments)     Nausea/vomiting    Allopurinol      Other reaction(s): abnormal transaminases       Actual Body Weight:   46.3 kg    Renal Function:   Estimated Creatinine Clearance: 12 mL/min (A) (based on SCr of 3.7 mg/dL (H)).,     Dialysis Method (if applicable):  intermittent HD    CBC (last 72 hours):  Recent Labs   Lab Result Units 04/13/24  1104   WBC K/uL 4.12   Hemoglobin g/dL 12.9   Hematocrit % 42.4   Platelets K/uL 152   Gran % % 64.8   Lymph % % 19.9   Mono % % 9.2   Eosinophil % % 4.4   Basophil % % 1.2   Differential Method  Automated       Metabolic Panel (last 72 hours):  Recent Labs   Lab Result Units 04/13/24  1104   Sodium mmol/L 139   Potassium mmol/L 3.9   Chloride mmol/L 102   CO2 mmol/L 19*   Glucose mg/dL 65*   BUN mg/dL 22*   Creatinine mg/dL 3.7*   Albumin g/dL 3.5   Total Bilirubin mg/dL 1.2*   Alkaline Phosphatase U/L 509*   AST U/L 44*   ALT U/L 22   Magnesium mg/dL 2.4   Phosphorus mg/dL 4.0       Drug levels (last 3 results):  No results for input(s): "VANCOMYCINRA", "VANCORANDOM", "VANCOMYCINPE", "VANCOPEAK", "VANCOMYCINTR", "VANCOTROUGH" in the last 72 hours.    Microbiologic Results:  Microbiology Results " (last 7 days)       Procedure Component Value Units Date/Time    Blood culture x two cultures. Draw prior to antibiotics. [[id number]] Collected: 04/13/24 1126    Order Status: Sent Specimen: Blood from Peripheral, Hand, Right Updated: 04/13/24 1304    Blood culture x two cultures. Draw prior to antibiotics. [[id number]] Collected: 04/13/24 1103    Order Status: Sent Specimen: Blood from Peripheral, Forearm, Right Updated: 04/13/24 1129

## 2024-04-13 NOTE — ASSESSMENT & PLAN NOTE
Patient is identified as having Combined Systolic and Diastolic heart failure that is Chronic. CHF is currently controlled. Latest ECHO performed and demonstrates- Results for orders placed during the hospital encounter of 05/08/23    Echo Saline Bubble? No    Interpretation Summary  · The left ventricle is normal in size with mild eccentric hypertrophy and severely decreased systolic function.  · The estimated ejection fraction is 23%.  · There is severe left ventricular global hypokinesis.  · Grade II left ventricular diastolic dysfunction.  · Moderate left atrial enlargement.  · Normal right ventricular size with normal right ventricular systolic function.  · Moderate right atrial enlargement.  · There is mild aortic valve stenosis.  · Aortic valve area is 1.58 cm2; peak velocity is 2.01 m/s; mean gradient is 8 mmHg.  · Moderate mitral regurgitation.  · Moderate to severe tricuspid regurgitation.  · Mild pulmonic regurgitation.  · Elevated central venous pressure (15 mmHg).  · The estimated PA systolic pressure is 47 mmHg.  · There is mild-moderate pulmonary hypertension.  · Moderate posterolateral pericardial effusion. Trivial under the RA  . Continue Beta Blocker and monitor clinical status closely. Monitor on telemetry. Patient is off CHF pathway.  Monitor strict Is&Os and daily weights.  Place on fluid restriction of 1.5 L. Cardiology has not been consulted. Continue to stress to patient importance of self efficacy and  on diet for CHF. Last BNP reviewed- and noted below   Recent Labs   Lab 04/13/24  1104   BNP >4,900*

## 2024-04-13 NOTE — ASSESSMENT & PLAN NOTE
Nutrition consulted. Most recent weight and BMI monitored-     Measurements:  Wt Readings from Last 1 Encounters:   04/13/24 46.3 kg (102 lb)   Body mass index is 17.51 kg/m².    Patient has been screened and assessed by RD.    Malnutrition Type:  Context:    Level:      Malnutrition Characteristic Summary:       Interventions/Recommendations (treatment strategy):

## 2024-04-13 NOTE — NURSING
Notified Bridger Varner of sustained tachycardia. MD states goal less than 120. 25mg metoprolol ordered. MD states to assess 30 min post admin.

## 2024-04-13 NOTE — ASSESSMENT & PLAN NOTE
"This patient does have evidence of infective focus  My overall impression is sepsis.  Source:  line infection   Antibiotics given-   Antibiotics (72h ago, onward)      Start     Stop Route Frequency Ordered    04/13/24 2100  mupirocin 2 % ointment         04/18/24 2059 Nasl 2 times daily 04/13/24 1319    04/13/24 2000  ceFEPIme (MAXIPIME) 1 g in dextrose 5 % in water (D5W) 100 mL IVPB (MB+)         -- IV Every 12 hours (non-standard times) 04/13/24 1330    04/13/24 1230  vancomycin (VANCOCIN) 1,000 mg in dextrose 5 % (D5W) 250 mL IVPB (Vial-Mate)         -- IV Once 04/13/24 1121    04/13/24 1217  vancomycin - pharmacy to dose  (vancomycin IVPB (PEDS and ADULTS))        Placed in "And" Linked Group    -- IV pharmacy to manage frequency 04/13/24 1117          Latest lactate reviewed-  Recent Labs   Lab 04/13/24  1104 04/13/24  1107   LACTATE 3.5*  --    POCLAC  --  3.67*     Organ dysfunction indicated by Acute kidney injury    Fluid challenge Ideal Body Weight- The patient's ideal body weight is Ideal body weight: 54.7 kg (120 lb 9.5 oz) which will be used to calculate fluid bolus of 30 ml/kg for treatment of septic shock.      Post- resuscitation assessment Yes Perfusion exam was performed within 6 hours of septic shock presentation after bolus shows Adequate tissue perfusion assessed by non-invasive monitoring       Will Not start Pressors- Levophed for MAP of 65  Source control achieved by: IV Abx  "

## 2024-04-13 NOTE — HPI
This is a 58 year old female, with a Anemia, Atrial fibrillation, CHF NYHA class II, chronic, systolic, CVA, ESRD on dialysis (Tues, Thurs, Sat) with completion of session today, Essential HTN, Hx of kidney cancer, Hyperparathyroidism, Myocardiopathy, and Prediabetes, who presents to the ED complaining of Palpitations, symptoms onset PTA. Patient reports cough, sleep disturbance, and generalized pruritus. Patient states she was at dialysis PTA and receiving dialysis when her heart rate jumped into the 120's and the dialysis nurses instructed her to present to the ER. Patient also states she has been on dialysis for 8 years and that she does not make urine. Patient further states she is on 2.5 mg of Eliquis. Additional history obtained from independent historian: patient's  states that in July last year she had a spot on her left foot and had a line inserted. Patient denies Hx of irregular heart rate or MI. Patient also denies rhinorrhea, sore throat, abdominal pain, emesis, diarrhea, or other associated symptoms. No other alleviating or exacerbating factors. This is the extent of the patient's complaints in the ED.patient is febrile in .8,tachycardic 120.sepsis likely  duo to l;ine infection,patient has been started on broad  spectrum IV Abx,blood culture is pending.

## 2024-04-13 NOTE — NURSING
MEWS monitoring mews score 4 with HR still 120's and BP 88/60. I went to bedside,pt denies CP but states that she does feel SOB at times. Pt appears SOB during visit but speaking in complete sentences. Pt to receive another bolus, will con't to monitor.Spoke with both charge and Juancho WANG.

## 2024-04-13 NOTE — NURSING
[facility] [place] 4 [place]      4/13/2024       Skin assessed on: Admit      [] No Pressure Injuries Present    []Prevention Measures Documented    [x] Yes LDA  for Pressure Injury Previously documented     [] Yes New Pressure Injury Discovered   [] LDA for New Pressure Injury Added      Attending RN:  Sammi Collins, RN     Second RN:  Emilie Dye, PCT

## 2024-04-13 NOTE — ED PROVIDER NOTES
"Encounter Date: 4/13/2024    SCRIBE #1 NOTE: I, Olivia Florentino, am scribing for, and in the presence of,  Artis Boyd MD. I have scribed the following portions of the note - Other sections scribed: HPI, ROS, PE.       History     Chief Complaint   Patient presents with    Palpitations     Pt to ED via POV with complaints of palpitations. Pt explains was at dialysis PTA receiving tx. Pt states "the dialysis nurse sent me here because my HR jumped to 120's during my tx."  at side reports "nurse was able to get her HR down a couple of times to 88, but then it went back up." Pt reports goes to dialysis Tues, Thurs, Sat. Finished entire tx, denies missing any days. Pt denies any complaints including chest pain and SOB. NAD     This is a 58 year old female, with a Anemia, Atrial fibrillation, CHF NYHA class II, chronic, systolic, CVA, ESRD on dialysis (Tues, Thurs, Sat) with completion of session today, Essential HTN, Hx of kidney cancer, Hyperparathyroidism, Myocardiopathy, and Prediabetes, who presents to the ED complaining of Palpitations, symptoms onset PTA. Patient reports cough, sleep disturbance, and generalized pruritus. Patient states she was at dialysis PTA and receiving dialysis when her heart rate jumped into the 120's and the dialysis nurses instructed her to present to the ER. Patient also states she has been on dialysis for 8 years and that she does not make urine. Patient further states she is on 2.5 mg of Eliquis. Additional history obtained from independent historian: patient's  states that in July last year she had a spot on her left foot and had a line inserted. Patient denies Hx of irregular heart rate or MI. Patient also denies rhinorrhea, sore throat, abdominal pain, emesis, diarrhea, or other associated symptoms.  No other alleviating or exacerbating factors. This is the extent of the patient's complaints in the ED.                The history is provided by the patient and the " spouse. No  was used.     Review of patient's allergies indicates:   Allergen Reactions    Carvedilol Other (See Comments)     Nausea/vomiting    Allopurinol      Other reaction(s): abnormal transaminases     Past Medical History:   Diagnosis Date    Anemia     Anticoagulant long-term use     Atrial fibrillation     Bronchitis 03/01/2017    Cancer 2016    kidney cancer    CHF (congestive heart failure), NYHA class II, chronic, systolic     CMV (cytomegalovirus) antibody positive     CVA (cerebral vascular accident) 1/3/2024    Encounter for blood transfusion     ESRD (end stage renal disease)     Essential hypertension 09/23/2015    H/O herpes simplex type 2 infection     Herpes simplex type 1 antibody positive     History of kidney cancer     s/p left nephrectomy 1/2016    Hyperparathyroidism, unspecified     Hyperuricemia without signs of inflammatory arthritis and tophaceous disease     Kidney stones     LGSIL (low grade squamous intraepithelial dysplasia)     Myocardiopathy 07/21/2017    Prediabetes     Proteinuria     Renal disorder     Thyroid nodule     Urate nephropathy      Past Surgical History:   Procedure Laterality Date    BREAST CYST EXCISION      COLONOSCOPY N/A 11/12/2015    COLONOSCOPY N/A 03/12/2021    Procedure: COLONOSCOPY;  Surgeon: Brendon Lanier MD;  Location: [facility];  Service: Endoscopy;  Laterality: N/A;  covid test 3/9, labs prior, prep instr mailed -ml    EXCISION OF ARTERIOVENOUS FISTULA Left 09/27/2023    Procedure: EXCISION, AV FISTULA;  Surgeon: FARIDEH Muñoz III, MD;  Location: [facility] OR [address] [place] [address];  Service: Vascular;  Laterality: Left;  LUE AV graft excision    INSERTION OF BIVENTRICULAR IMPLANTABLE CARDIOVERTER-DEFIBRILLATOR (ICD)  04/2021    INSERTION OF TUNNELED CENTRAL VENOUS CATHETER (CVC) WITH SUBCUTANEOUS PORT N/A 01/25/2023    Procedure: INSERTION, DUAL LUMEN CATHETER WITH PORT, WITH IMAGING GUIDANCE;  Surgeon: FARIDEH Muñoz III, MD;  Location:  [facility] OR 2ND FLR;  Service: Peripheral Vascular;  Laterality: N/A;  possinble permcath placment    NEPHRECTOMY-LAPAROSCOPIC Left 01/12/2016    PERCUTANEOUS TRANSLUMINAL ANGIOPLASTY OF ARTERIOVENOUS FISTULA Left 04/19/2023    Procedure: PTA, AV FISTULA;  Surgeon: FARIDEH Muñoz III, MD;  Location: [facility] CATH LAB;  Service: Peripheral Vascular;  Laterality: Left;    PERITONEAL CATHETER INSERTION      Permacath insertion  01/12/2017    PLACEMENT OF ARTERIOVENOUS GRAFT  12/28/2022    Procedure: INSERTION, GRAFT, ARTERIOVENOUS;  Surgeon: FARIDEH Muñoz III, MD;  [facility];  Service: Peripheral Vascular;;    REMOVAL, GRAFT, ARTERIOVENOUS, UPPER EXTREMITY Left 01/25/2023    Procedure: REMOVAL, GRAFT, ARTERIOVENOUS, UPPER EXTREMITY;  Surgeon: FARIDEH Muñoz III, MD;  [facility];  Service: Peripheral Vascular;  Laterality: Left;    REVISION OF ARTERIOVENOUS FISTULA Left 05/01/2023    Procedure: REVISION, AV FISTULA;  Surgeon: FARIDEH Muñoz III, MD;  [address];  Service: Peripheral Vascular;  Laterality: Left;  LUE AVG revision    REVISION OF PROCEDURE INVOLVING ARTERIOVENOUS GRAFT Left 12/28/2022    Procedure: REVISION, PROCEDURE INVOLVING ARTERIOVENOUS GRAFT;  Surgeon: FARIDEH Muñoz III, MD;  [facility];  Service: Peripheral Vascular;  Laterality: Left;    REVISION OF PROCEDURE INVOLVING ARTERIOVENOUS GRAFT Left 07/21/2023    Procedure: LEFT ARM ARTERIOVENOUS GRAFT EXCISION  AND LIGATION;  Surgeon: Obi Werner MD;  Location: [facility];  Service: Vascular;  Laterality: Left;  Left AVG excision and revision with interposition    SALPINGOOPHORECTOMY Right 2016    KJB---DAVINCI    TONSILLECTOMY      TUBAL LIGATION       Family History   Problem Relation Name Age of Onset    Hypertension Mother      Diabetes Father      Kidney disease Father      No Known Problems Sister Sophy     Heart disease Sister Maria A     No Known Problems Sister Claudine     No  Known Problems Brother Dax     No Known Problems Brother Don     Cataracts Maternal Aunt      No Known Problems Maternal Uncle      No Known Problems Paternal Aunt      No Known Problems Paternal Uncle      No Known Problems Maternal Grandmother      Diabetes Maternal Grandfather      No Known Problems Paternal Grandmother      No Known Problems Paternal Grandfather      No Known Problems Son      No Known Problems Son      No Known Problems Other      Breast cancer Neg Hx      Colon cancer Neg Hx      Cancer Neg Hx      Stroke Neg Hx      Amblyopia Neg Hx      Blindness Neg Hx      Glaucoma Neg Hx      Macular degeneration Neg Hx      Retinal detachment Neg Hx      Strabismus Neg Hx      Thyroid disease Neg Hx       Social History     Tobacco Use    Smoking status: Never     Passive exposure: Never    Smokeless tobacco: Never   Substance Use Topics    Alcohol use: No    Drug use: Yes     Types: Hydrocodone     Review of Systems   Constitutional:  Negative for fever.   HENT:  Negative for trouble swallowing.    Eyes:  Negative for visual disturbance.   Respiratory:  Positive for cough. Negative for shortness of breath.    Cardiovascular:  Positive for palpitations. Negative for chest pain.   Gastrointestinal:  Negative for abdominal pain.   Genitourinary:  Negative for difficulty urinating.   Musculoskeletal:  Negative for joint swelling.   Skin:  Negative for color change.        (+) generalized pruritus    Neurological:  Negative for dizziness and headaches.   Psychiatric/Behavioral:  Positive for sleep disturbance.        Physical Exam     Initial Vitals [04/13/24 0918]   BP Pulse Resp Temp SpO2   122/81 (!) 133 18 (!) 100.8 °F (38.2 °C) 95 %      MAP       --         Physical Exam    Nursing note and vitals reviewed.  Constitutional: She appears well-developed and well-nourished.   HENT:   Head: Normocephalic and atraumatic.   Eyes: EOM are normal. Pupils are equal, round, and reactive to light.   Neck: Neck  supple. No thyromegaly present. No JVD present.   No Meningeal signs.    Normal range of motion.  Cardiovascular:  Normal rate and regular rhythm.     Exam reveals no gallop and no friction rub.       No murmur heard.  Pulmonary/Chest: Breath sounds normal. No respiratory distress.   Abdominal: Abdomen is soft. Bowel sounds are normal. There is no abdominal tenderness.   Musculoskeletal:         General: No tenderness or edema. Normal range of motion.      Cervical back: Normal range of motion and neck supple.     Neurological: She is alert and oriented to person, place, and time. She has normal strength. GCS score is 15. GCS eye subscore is 4. GCS verbal subscore is 5. GCS motor subscore is 6.   Skin: Skin is warm and dry. Capillary refill takes less than 2 seconds. No rash noted. No erythema.   Eschar wound.   Dependent rubor to bilateral toes.  Wound to third digit.   left subclavian catheter   Psychiatric: She has a normal mood and affect.         ED Course   Procedures  Labs Reviewed   CBC W/ AUTO DIFFERENTIAL - Abnormal; Notable for the following components:       Result Value    MCHC 30.4 (*)     RDW 22.3 (*)     Lymph # 0.8 (*)     nRBC 1 (*)     All other components within normal limits   COMPREHENSIVE METABOLIC PANEL - Abnormal; Notable for the following components:    CO2 19 (*)     Glucose 65 (*)     BUN 22 (*)     Creatinine 3.7 (*)     Total Protein 9.4 (*)     Total Bilirubin 1.2 (*)     Alkaline Phosphatase 509 (*)     AST 44 (*)     eGFR 14 (*)     Anion Gap 18 (*)     All other components within normal limits   LACTIC ACID, PLASMA - Abnormal; Notable for the following components:    Lactate (Lactic Acid) 3.5 (*)     All other components within normal limits    Narrative:     LACTIC ACID critical result(s) called and verbal readback obtained   from [facility] [place]13[place]2024 12:47   PROCALCITONIN - Abnormal; Notable for the following components:    Procalcitonin 0.74 (*)     All other  "components within normal limits   TROPONIN I - Abnormal; Notable for the following components:    Troponin I 0.209 (*)     All other components within normal limits   B-TYPE NATRIURETIC PEPTIDE - Abnormal; Notable for the following components:    BNP >4,900 (*)     All other components within normal limits   LACTIC ACID, PLASMA - Abnormal; Notable for the following components:    Lactate (Lactic Acid) 2.8 (*)     All other components within normal limits   ISTAT PROCEDURE - Abnormal; Notable for the following components:    POC PTWBT 16.7 (*)     POC PTINR 1.4 (*)     All other components within normal limits   ISTAT LACTATE - Abnormal; Notable for the following components:    POC Lactate 3.67 (*)     All other components within normal limits   MAGNESIUM   PHOSPHORUS   PROTIME-INR   POCT INFLUENZA A/B MOLECULAR   SARS-COV-2 RDRP GENE   POCT STREP A MOLECULAR     EKG Readings: (Independently Interpreted)   Atrial flutter 2 to 1 conduction; heart rate 127.        Imaging Results              X-Ray Chest AP Portable (Final result)  Result time 04/13/24 11:38:42      Final result by Edmond Queen MD (04/13/24 11:38:42)                   Impression:      No detrimental change when compared with 01/02/2024.    Additional findings discussed in the body of the report.      Electronically signed by: Edmond Queen MD  Date:    04/13/2024  Time:    11:38               Narrative:    EXAMINATION:  XR CHEST AP PORTABLE    CLINICAL HISTORY:  Provided history is "Sepsis;  ".    TECHNIQUE:  One view of the chest.    COMPARISON:  01/02/2024.    FINDINGS:  Cardiac wires overlie the chest.  Left chest wall AICD is again present.  Left-sided central venous catheter overlies the right atrium.  Cardiac silhouette is enlarged and similar to the prior study.  Central vascular congestion with coarse perihilar interstitial lung markings.  Persistent increased attenuation in the retrocardiac left lung base, likely exaggerated by " enlarged cardiac silhouette and/or pericardial fluid.  No confluent area of consolidation.  No sizable pleural effusion.  No pneumothorax.  No detrimental change when compared with the prior study.                                       Medications   vancomycin - pharmacy to dose (has no administration in time range)   mupirocin 2 % ointment ( Nasal Not Given 4/15/24 0900)   acetaminophen tablet 650 mg (has no administration in time range)   aspirin EC tablet 81 mg (81 mg Oral Not Given 4/15/24 0907)   atorvastatin tablet 40 mg (40 mg Oral Not Given 4/15/24 0907)   levothyroxine tablet 75 mcg (75 mcg Oral Given 4/15/24 0611)   famotidine tablet 20 mg (20 mg Oral Not Given 4/15/24 0907)   oxyCODONE immediate release tablet 5 mg (has no administration in time range)   midodrine tablet 10 mg (10 mg Oral Not Given 4/15/24 0907)   metoprolol succinate (TOPROL-XL) 24 hr tablet 25 mg (25 mg Oral Not Given 4/15/24 0907)   ceFEPIme (MAXIPIME) 1 g in dextrose 5 % in water (D5W) 100 mL IVPB (MB+) (1 g Intravenous New Bag 4/15/24 0907)   acetaminophen tablet 1,000 mg (1,000 mg Oral Given 4/13/24 1042)   diltiaZEM injection 10 mg (10 mg Intravenous Given 4/13/24 1304)   ceFEPIme (MAXIPIME) 1 g in dextrose 5 % in water (D5W) 100 mL IVPB (MB+) (0 g Intravenous Stopped 4/13/24 1331)   vancomycin (VANCOCIN) 1,000 mg in dextrose 5 % (D5W) 250 mL IVPB (Vial-Mate) (0 mg Intravenous Stopped 4/13/24 1513)   sodium chloride 0.9% bolus 250 mL 250 mL (0 mLs Intravenous Stopped 4/13/24 1617)   sodium chloride 0.9% bolus 250 mL 250 mL ( Intravenous Canceled Entry 4/13/24 1800)   metoprolol tartrate (LOPRESSOR) tablet 25 mg (25 mg Oral Given 4/13/24 1900)   vancomycin (VANCOCIN) 500 mg in dextrose 5 % in water (D5W) 100 mL IVPB (MB+) (0 mg Intravenous Stopped 4/14/24 0840)     Medical Decision Making  This is an emergent evaluation of a 58 y.o. female who presents with palpitations. The patient was seen and examined. The history and physical  exam was obtained. The nursing notes and vital signs were reviewed. Secondary to symptoms and examination findings, I ordered Imaging. .      Amount and/or Complexity of Data Reviewed  Independent Historian:      Details: SEE HPI.   Labs: ordered.  Radiology: ordered.    Risk  OTC drugs.  Prescription drug management.  Decision regarding hospitalization.    Admit for fever unknown source. R/O line sepsis. Pt sent after completing dialysis today for 's. PT in aflutter 2:1 Conduction. already on metoprolol and eliquis. EF 23% with grade 2 diastolic heart failure. Pt is a vasculopath. has had procedure on PVD and sees wound care for toe/foot wounds that appear to be stable and not a source of fever. Covid and flu negative. CXR clear. Doesn't make urine so deferred. PT has left Subclavian tunneled Jan. No redness or drainage to the area. I spoke with Dr Bermudez nephrology who did not want me to pull the line for tip culture. Did want me to start Cefepime and Vanc. HR still 120. Diltiazem initially ordered but never given by the nurse for reasons I am unaware. But I am not being aggressive with rate control in the setting of fever/infection. Pt feels at her baseline. No gi symptoms. Wants to eat. Lactic acid is elevated 3.6. Sepsis fluids held due to CHF and ESRD..         Scribe Attestation:   Scribe #1: I performed the above scribed service and the documentation accurately describes the services I performed. I attest to the accuracy of the note.           I, Artis Boyd, personally performed the services described in this documentation. All medical record entries made by the scribe were at my direction and in my presence.  I have reviewed the chart and agree that the record reflects my personal performance and is accurate and complete.                      Clinical Impression:  Final diagnoses:  [R00.2] Palpitations  [R50.9] Fever, unknown origin (Primary)  [N18.6, Z99.2] ESRD (end stage renal disease) on  dialysis          ED Disposition Condition    Admit                 Artis Boyd MD  04/15/24 0930

## 2024-04-13 NOTE — NURSING
Pt HR sustained 120s since admit. BP 80s systolic. Notified MD. MD orders 250cc fluid bolus. Will administer.

## 2024-04-14 LAB
ANION GAP SERPL CALC-SCNC: 23 MMOL/L (ref 8–16)
BASOPHILS # BLD AUTO: 0.06 K/UL (ref 0–0.2)
BASOPHILS NFR BLD: 0.8 % (ref 0–1.9)
BUN SERPL-MCNC: 38 MG/DL (ref 6–20)
CALCIUM SERPL-MCNC: 10.3 MG/DL (ref 8.7–10.5)
CHLORIDE SERPL-SCNC: 104 MMOL/L (ref 95–110)
CO2 SERPL-SCNC: 14 MMOL/L (ref 23–29)
CREAT SERPL-MCNC: 4.9 MG/DL (ref 0.5–1.4)
DIFFERENTIAL METHOD BLD: ABNORMAL
EOSINOPHIL # BLD AUTO: 0.2 K/UL (ref 0–0.5)
EOSINOPHIL NFR BLD: 1.9 % (ref 0–8)
ERYTHROCYTE [DISTWIDTH] IN BLOOD BY AUTOMATED COUNT: 22.1 % (ref 11.5–14.5)
EST. GFR  (NO RACE VARIABLE): 10 ML/MIN/1.73 M^2
GLUCOSE SERPL-MCNC: 54 MG/DL (ref 70–110)
HCT VFR BLD AUTO: 37.2 % (ref 37–48.5)
HGB BLD-MCNC: 11.1 G/DL (ref 12–16)
IMM GRANULOCYTES # BLD AUTO: 0.06 K/UL (ref 0–0.04)
IMM GRANULOCYTES NFR BLD AUTO: 0.8 % (ref 0–0.5)
LACTATE SERPL-SCNC: 5.7 MMOL/L (ref 0.5–2.2)
LYMPHOCYTES # BLD AUTO: 1.3 K/UL (ref 1–4.8)
LYMPHOCYTES NFR BLD: 16.1 % (ref 18–48)
MCH RBC QN AUTO: 28.2 PG (ref 27–31)
MCHC RBC AUTO-ENTMCNC: 29.8 G/DL (ref 32–36)
MCV RBC AUTO: 94 FL (ref 82–98)
MONOCYTES # BLD AUTO: 1.1 K/UL (ref 0.3–1)
MONOCYTES NFR BLD: 13.3 % (ref 4–15)
NEUTROPHILS # BLD AUTO: 5.4 K/UL (ref 1.8–7.7)
NEUTROPHILS NFR BLD: 67.1 % (ref 38–73)
NRBC BLD-RTO: 1 /100 WBC
PLATELET # BLD AUTO: 143 K/UL (ref 150–450)
PMV BLD AUTO: 11.2 FL (ref 9.2–12.9)
POTASSIUM SERPL-SCNC: 4.9 MMOL/L (ref 3.5–5.1)
RBC # BLD AUTO: 3.94 M/UL (ref 4–5.4)
SODIUM SERPL-SCNC: 141 MMOL/L (ref 136–145)
VANCOMYCIN SERPL-MCNC: 17.7 UG/ML
WBC # BLD AUTO: 7.97 K/UL (ref 3.9–12.7)

## 2024-04-14 PROCEDURE — 36415 COLL VENOUS BLD VENIPUNCTURE: CPT | Performed by: HOSPITALIST

## 2024-04-14 PROCEDURE — 94761 N-INVAS EAR/PLS OXIMETRY MLT: CPT

## 2024-04-14 PROCEDURE — 80048 BASIC METABOLIC PNL TOTAL CA: CPT | Performed by: HOSPITALIST

## 2024-04-14 PROCEDURE — 25000003 PHARM REV CODE 250: Performed by: HOSPITALIST

## 2024-04-14 PROCEDURE — 85025 COMPLETE CBC W/AUTO DIFF WBC: CPT | Performed by: HOSPITALIST

## 2024-04-14 PROCEDURE — 63600175 PHARM REV CODE 636 W HCPCS: Performed by: HOSPITALIST

## 2024-04-14 PROCEDURE — 36415 COLL VENOUS BLD VENIPUNCTURE: CPT | Performed by: INTERNAL MEDICINE

## 2024-04-14 PROCEDURE — 83605 ASSAY OF LACTIC ACID: CPT | Performed by: INTERNAL MEDICINE

## 2024-04-14 PROCEDURE — 21400001 HC TELEMETRY ROOM

## 2024-04-14 PROCEDURE — 80202 ASSAY OF VANCOMYCIN: CPT | Performed by: HOSPITALIST

## 2024-04-14 RX ORDER — METOPROLOL SUCCINATE 25 MG/1
25 TABLET, EXTENDED RELEASE ORAL DAILY
Status: DISCONTINUED | OUTPATIENT
Start: 2024-04-14 | End: 2024-04-16

## 2024-04-14 RX ADMIN — VANCOMYCIN HYDROCHLORIDE 500 MG: 500 INJECTION, POWDER, LYOPHILIZED, FOR SOLUTION INTRAVENOUS at 07:04

## 2024-04-14 RX ADMIN — ATORVASTATIN CALCIUM 40 MG: 40 TABLET, FILM COATED ORAL at 08:04

## 2024-04-14 RX ADMIN — FAMOTIDINE 20 MG: 20 TABLET ORAL at 08:04

## 2024-04-14 RX ADMIN — METOPROLOL SUCCINATE 25 MG: 25 TABLET, EXTENDED RELEASE ORAL at 08:04

## 2024-04-14 RX ADMIN — ASPIRIN 81 MG: 81 TABLET, COATED ORAL at 08:04

## 2024-04-14 RX ADMIN — MIDODRINE HYDROCHLORIDE 10 MG: 5 TABLET ORAL at 08:04

## 2024-04-14 RX ADMIN — APIXABAN 2.5 MG: 2.5 TABLET, FILM COATED ORAL at 08:04

## 2024-04-14 RX ADMIN — MUPIROCIN: 20 OINTMENT TOPICAL at 08:04

## 2024-04-14 RX ADMIN — CEFEPIME 1 G: 1 INJECTION, POWDER, FOR SOLUTION INTRAMUSCULAR; INTRAVENOUS at 07:04

## 2024-04-14 RX ADMIN — LEVOTHYROXINE SODIUM 75 MCG: 75 TABLET ORAL at 06:04

## 2024-04-14 RX ADMIN — MIDODRINE HYDROCHLORIDE 10 MG: 5 TABLET ORAL at 02:04

## 2024-04-14 NOTE — HOSPITAL COURSE
This is a 58 year old female, with a Anemia, Atrial fibrillation, CHF NYHA class II, chronic, systolic, CVA, ESRD on dialysis (Tues, Thurs, Sat) with completion of session today, Essential HTN, Hx of kidney cancer, Hyperparathyroidism, Myocardiopathy, and Prediabetes, who presents to the ED complaining of Palpitations, symptoms onset PTA. Patient reports cough, sleep disturbance, and generalized pruritus. Patient states she was at dialysis PTA and receiving dialysis when her heart rate jumped into the 120's and the dialysis nurses instructed her to present to the ER. .patient is febrile in .8,tachycardic 120.sepsis likely  duo to l;ine infection,patient has been started on broad  spectrum IV Abx,blood culture is pending.HR and BP is improved at this time.cultures sofar negative.  Patient is lethargic today,code stroke was called,transfered to ICU,head CT show no acute process,evaluated by Tele neuro,will have CTA head,check ammonia and TSH,hypoglycemia is resolved,will monitor closely.imagines and labs show no acute process,acute metabolic encephalopathy  seems improving,consulted neurology,ordered EEG.show no seizure activity,but metabolic  encephalopathy,MRI can not be done duo to AICD.patient seems is deliriums,per \d,some improvement.  Discontinued Abx,no sign of infection is seen.

## 2024-04-14 NOTE — PROGRESS NOTES
Pharmacokinetic Assessment Follow Up: IV Vancomycin    Vancomycin serum concentration assessment(s):    The random level was drawn correctly and can be used to guide therapy at this time. The measurement is within the desired definitive target range of 10 to 20 mcg/mL.    Vancomycin Regimen Plan:    Re-dose when the random level is less than 20 mcg/mL, next level to be drawn at 0400 on 4/15  Vancomycin 500mg to be given on 4/14  Drug levels (last 3 results):  Recent Labs   Lab Result Units 04/14/24  0441   Vancomycin, Random ug/mL 17.7       Pharmacy will continue to follow and monitor vancomycin.    Please contact pharmacy at extension [phone] for questions regarding this assessment.    Thank you for the consult,   Trice Matthew       Patient brief summary:  Eva Bloom is a 58 y.o. female initiated on antimicrobial therapy with IV Vancomycin for treatment of sepsis      Drug Allergies:   Review of patient's allergies indicates:   Allergen Reactions    Carvedilol Other (See Comments)     Nausea/vomiting    Allopurinol      Other reaction(s): abnormal transaminases       Actual Body Weight:   47.6 kg    Renal Function:   Estimated Creatinine Clearance: 9.3 mL/min (A) (based on SCr of 4.9 mg/dL (H)).,     Dialysis Method (if applicable):  intermittent HD    CBC (last 72 hours):  Recent Labs   Lab Result Units 04/13/24  1104 04/14/24  0441   WBC K/uL 4.12 7.97   Hemoglobin g/dL 12.9 11.1*   Hematocrit % 42.4 37.2   Platelets K/uL 152 143*   Gran % % 64.8 67.1   Lymph % % 19.9 16.1*   Mono % % 9.2 13.3   Eosinophil % % 4.4 1.9   Basophil % % 1.2 0.8   Differential Method  Automated Automated       Metabolic Panel (last 72 hours):  Recent Labs   Lab Result Units 04/13/24  1104 04/14/24  0441   Sodium mmol/L 139 141   Potassium mmol/L 3.9 4.9   Chloride mmol/L 102 104   CO2 mmol/L 19* 14*   Glucose mg/dL 65* 54*   BUN mg/dL 22* 38*   Creatinine mg/dL 3.7* 4.9*   Albumin g/dL 3.5  --    Total Bilirubin mg/dL 1.2*  --     Alkaline Phosphatase U/L 509*  --    AST U/L 44*  --    ALT U/L 22  --    Magnesium mg/dL 2.4  --    Phosphorus mg/dL 4.0  --        Vancomycin Administrations:  vancomycin given in the last 96 hours                     vancomycin (VANCOCIN) 1,000 mg in dextrose 5 % (D5W) 250 mL IVPB (Vial-Mate) ()  Restarted 04/13/24 1458      Restarted  1423     1,000 mg New Bag  1335                    Microbiologic Results:  Microbiology Results (last 7 days)       Procedure Component Value Units Date/Time    Blood culture x two cultures. Draw prior to antibiotics. [[id number]] Collected: 04/13/24 1126    Order Status: Completed Specimen: Blood from Peripheral, Hand, Right Updated: 04/13/24 2112     Blood Culture, Routine No Growth to date    Narrative:      Aerobic and anaerobic    Blood culture x two cultures. Draw prior to antibiotics. [[id number]] Collected: 04/13/24 1103    Order Status: Completed Specimen: Blood from Peripheral, Forearm, Right Updated: 04/13/24 2112     Blood Culture, Routine No Growth to date    Narrative:      Aerobic and anaerobic

## 2024-04-14 NOTE — ASSESSMENT & PLAN NOTE
"This patient does have evidence of infective focus  My overall impression is sepsis.  Source:  line infection   Antibiotics given-   Antibiotics (72h ago, onward)      Start     Stop Route Frequency Ordered    04/13/24 2100  mupirocin 2 % ointment         04/18/24 2059 Nasl 2 times daily 04/13/24 1319    04/13/24 2000  ceFEPIme (MAXIPIME) 1 g in dextrose 5 % in water (D5W) 100 mL IVPB (MB+)         -- IV Every 12 hours (non-standard times) 04/13/24 1330    04/13/24 1217  vancomycin - pharmacy to dose  (vancomycin IVPB (PEDS and ADULTS))        Placed in "And" Linked Group    -- IV pharmacy to manage frequency 04/13/24 1117          Latest lactate reviewed-  Recent Labs   Lab 04/13/24  1107 04/13/24  1423   LACTATE  --  2.8*   POCLAC 3.67*  --        Organ dysfunction indicated by Acute kidney injury    Fluid challenge Ideal Body Weight- The patient's ideal body weight is Ideal body weight: 54.7 kg (120 lb 9.5 oz) which will be used to calculate fluid bolus of 30 ml/kg for treatment of septic shock.      Post- resuscitation assessment Yes Perfusion exam was performed within 6 hours of septic shock presentation after bolus shows Adequate tissue perfusion assessed by non-invasive monitoring       Will Not start Pressors- Levophed for MAP of 65  Source control achieved by: IV Abx.  HR and BP is improved at this time.cultures sofar negative.  "

## 2024-04-14 NOTE — SUBJECTIVE & OBJECTIVE
Interval History: HR and BP is improved at this time.cultures sofar negative.    Review of Systems   Constitutional:  Positive for activity change and appetite change.   Respiratory:  Negative for apnea and choking.    Gastrointestinal:  Negative for abdominal pain.   Endocrine: Negative for cold intolerance.   Genitourinary:  Negative for dyspareunia.   Neurological:  Positive for weakness.     Objective:     Vital Signs (Most Recent):  Temp: 97.7 °F (36.5 °C) (04/14/24 0725)  Pulse: 89 (04/14/24 0759)  Resp: 19 (04/14/24 0725)  BP: 107/72 (04/14/24 0725)  SpO2: 98 % (04/14/24 0725) Vital Signs (24h Range):  Temp:  [96.1 °F (35.6 °C)-98 °F (36.7 °C)] 97.7 °F (36.5 °C)  Pulse:  [] 89  Resp:  [17-31] 19  SpO2:  [90 %-100 %] 98 %  BP: ()/(60-83) 107/72     Weight: 47.6 kg (104 lb 14.4 oz)  Body mass index is 18.01 kg/m².    Intake/Output Summary (Last 24 hours) at 4/14/2024 1031  Last data filed at 4/14/2024 0400  Gross per 24 hour   Intake 1246.16 ml   Output 0 ml   Net 1246.16 ml         Physical Exam  HENT:      Head: Atraumatic.   Cardiovascular:      Rate and Rhythm: Tachycardia present.   Pulmonary:      Effort: No respiratory distress.   Abdominal:      General: There is no distension.   Neurological:      Mental Status: She is alert and oriented to person, place, and time.             Significant Labs: All pertinent labs within the past 24 hours have been reviewed.  BMP:   Recent Labs   Lab 04/13/24  1104 04/14/24  0441   GLU 65* 54*    141   K 3.9 4.9    104   CO2 19* 14*   BUN 22* 38*   CREATININE 3.7* 4.9*   CALCIUM 10.2 10.3   MG 2.4  --      CBC:   Recent Labs   Lab 04/13/24  1104 04/14/24  0441   WBC 4.12 7.97   HGB 12.9 11.1*   HCT 42.4 37.2    143*     CMP:   Recent Labs   Lab 04/13/24  1104 04/14/24  0441    141   K 3.9 4.9    104   CO2 19* 14*   GLU 65* 54*   BUN 22* 38*   CREATININE 3.7* 4.9*   CALCIUM 10.2 10.3   PROT 9.4*  --    ALBUMIN 3.5  --    BILITOT  1.2*  --    ALKPHOS 509*  --    AST 44*  --    ALT 22  --    ANIONGAP 18* 23*       Significant Imaging: I have reviewed all pertinent imaging results/findings within the past 24 hours.

## 2024-04-14 NOTE — PROGRESS NOTES
[facility]  Progress Note    Patient Name: Eva Bloom  MRN: [id number]  Patient Class: IP- Inpatient   Admission Date: 4/13/2024  Length of Stay: 1 days  Attending Physician: José Miguel Cristina, *  Primary Care Provider: Sowmya Mccain MD        Subjective:     Principal Problem:Sepsis        HPI:  This is a 58 year old female, with a Anemia, Atrial fibrillation, CHF NYHA class II, chronic, systolic, CVA, ESRD on dialysis (Tues, Thurs, Sat) with completion of session today, Essential HTN, Hx of kidney cancer, Hyperparathyroidism, Myocardiopathy, and Prediabetes, who presents to the ED complaining of Palpitations, symptoms onset PTA. Patient reports cough, sleep disturbance, and generalized pruritus. Patient states she was at dialysis PTA and receiving dialysis when her heart rate jumped into the 120's and the dialysis nurses instructed her to present to the ER. Patient also states she has been on dialysis for 8 years and that she does not make urine. Patient further states she is on 2.5 mg of Eliquis. Additional history obtained from independent historian: patient's  states that in July last year she had a spot on her left foot and had a line inserted. Patient denies Hx of irregular heart rate or MI. Patient also denies rhinorrhea, sore throat, abdominal pain, emesis, diarrhea, or other associated symptoms. No other alleviating or exacerbating factors. This is the extent of the patient's complaints in the ED.patient is febrile in .8,tachycardic 120.sepsis likely  duo to l;ine infection,patient has been started on broad  spectrum IV Abx,blood culture is pending.    Overview/Hospital Course:  This is a 58 year old female, with a Anemia, Atrial fibrillation, CHF NYHA class II, chronic, systolic, CVA, ESRD on dialysis (Tues, Thurs, Sat) with completion of session today, Essential HTN, Hx of kidney cancer, Hyperparathyroidism, Myocardiopathy, and Prediabetes, who  presents to the ED complaining of Palpitations, symptoms onset PTA. Patient reports cough, sleep disturbance, and generalized pruritus. Patient states she was at dialysis PTA and receiving dialysis when her heart rate jumped into the 120's and the dialysis nurses instructed her to present to the ER. .patient is febrile in .8,tachycardic 120.sepsis likely  duo to l;ine infection,patient has been started on broad  spectrum IV Abx,blood culture is pending.HR and BP is improved at this time.cultures sofar negative.    Interval History: HR and BP is improved at this time.cultures sofar negative.    Review of Systems   Constitutional:  Positive for activity change and appetite change.   Respiratory:  Negative for apnea and choking.    Gastrointestinal:  Negative for abdominal pain.   Endocrine: Negative for cold intolerance.   Genitourinary:  Negative for dyspareunia.   Neurological:  Positive for weakness.     Objective:     Vital Signs (Most Recent):  Temp: 97.7 °F (36.5 °C) (04/14/24 0725)  Pulse: 89 (04/14/24 0759)  Resp: 19 (04/14/24 0725)  BP: 107/72 (04/14/24 0725)  SpO2: 98 % (04/14/24 0725) Vital Signs (24h Range):  Temp:  [96.1 °F (35.6 °C)-98 °F (36.7 °C)] 97.7 °F (36.5 °C)  Pulse:  [] 89  Resp:  [17-31] 19  SpO2:  [90 %-100 %] 98 %  BP: ()/(60-83) 107/72     Weight: 47.6 kg (104 lb 14.4 oz)  Body mass index is 18.01 kg/m².    Intake/Output Summary (Last 24 hours) at 4/14/2024 1031  Last data filed at 4/14/2024 0400  Gross per 24 hour   Intake 1246.16 ml   Output 0 ml   Net 1246.16 ml         Physical Exam  HENT:      Head: Atraumatic.   Cardiovascular:      Rate and Rhythm: Tachycardia present.   Pulmonary:      Effort: No respiratory distress.   Abdominal:      General: There is no distension.   Neurological:      Mental Status: She is alert and oriented to person, place, and time.             Significant Labs: All pertinent labs within the past 24 hours have been reviewed.  BMP:   Recent  "Labs   Lab 04/13/24  1104 04/14/24  0441   GLU 65* 54*    141   K 3.9 4.9    104   CO2 19* 14*   BUN 22* 38*   CREATININE 3.7* 4.9*   CALCIUM 10.2 10.3   MG 2.4  --      CBC:   Recent Labs   Lab 04/13/24  1104 04/14/24  0441   WBC 4.12 7.97   HGB 12.9 11.1*   HCT 42.4 37.2    143*     CMP:   Recent Labs   Lab 04/13/24  1104 04/14/24  0441    141   K 3.9 4.9    104   CO2 19* 14*   GLU 65* 54*   BUN 22* 38*   CREATININE 3.7* 4.9*   CALCIUM 10.2 10.3   PROT 9.4*  --    ALBUMIN 3.5  --    BILITOT 1.2*  --    ALKPHOS 509*  --    AST 44*  --    ALT 22  --    ANIONGAP 18* 23*       Significant Imaging: I have reviewed all pertinent imaging results/findings within the past 24 hours.    Assessment/Plan:      * Sepsis  This patient does have evidence of infective focus  My overall impression is sepsis.  Source:  line infection   Antibiotics given-   Antibiotics (72h ago, onward)      Start     Stop Route Frequency Ordered    04/13/24 2100  mupirocin 2 % ointment         04/18/24 2059 Nasl 2 times daily 04/13/24 1319    04/13/24 2000  ceFEPIme (MAXIPIME) 1 g in dextrose 5 % in water (D5W) 100 mL IVPB (MB+)         -- IV Every 12 hours (non-standard times) 04/13/24 1330    04/13/24 1217  vancomycin - pharmacy to dose  (vancomycin IVPB (PEDS and ADULTS))        Placed in "And" Linked Group    -- IV pharmacy to manage frequency 04/13/24 1117          Latest lactate reviewed-  Recent Labs   Lab 04/13/24  1107 04/13/24  1423   LACTATE  --  2.8*   POCLAC 3.67*  --        Organ dysfunction indicated by Acute kidney injury    Fluid challenge Ideal Body Weight- The patient's ideal body weight is Ideal body weight: 54.7 kg (120 lb 9.5 oz) which will be used to calculate fluid bolus of 30 ml/kg for treatment of septic shock.      Post- resuscitation assessment Yes Perfusion exam was performed within 6 hours of septic shock presentation after bolus shows Adequate tissue perfusion assessed by non-invasive " monitoring       Will Not start Pressors- Levophed for MAP of 65  Source control achieved by: IV Abx.  HR and BP is improved at this time.cultures sofar negative.    Severe protein-calorie malnutrition  Nutrition consulted. Most recent weight and BMI monitored-     Measurements:  Wt Readings from Last 1 Encounters:   04/13/24 46.3 kg (102 lb)   Body mass index is 17.51 kg/m².    Patient has been screened and assessed by RD.    Malnutrition Type:  Context:    Level:      Malnutrition Characteristic Summary:       Interventions/Recommendations (treatment strategy):         Hypothyroidism  On synthroid.      PAF (paroxysmal atrial fibrillation)   On OAC and BB.    ICD (implantable cardioverter-defibrillator) in place - SubQ  Will monitor.      Chronic combined systolic and diastolic congestive heart failure  Patient is identified as having Combined Systolic and Diastolic heart failure that is Chronic. CHF is currently controlled. Latest ECHO performed and demonstrates- Results for orders placed during the hospital encounter of 05/08/23    Echo Saline Bubble? No    Interpretation Summary  · The left ventricle is normal in size with mild eccentric hypertrophy and severely decreased systolic function.  · The estimated ejection fraction is 23%.  · There is severe left ventricular global hypokinesis.  · Grade II left ventricular diastolic dysfunction.  · Moderate left atrial enlargement.  · Normal right ventricular size with normal right ventricular systolic function.  · Moderate right atrial enlargement.  · There is mild aortic valve stenosis.  · Aortic valve area is 1.58 cm2; peak velocity is 2.01 m/s; mean gradient is 8 mmHg.  · Moderate mitral regurgitation.  · Moderate to severe tricuspid regurgitation.  · Mild pulmonic regurgitation.  · Elevated central venous pressure (15 mmHg).  · The estimated PA systolic pressure is 47 mmHg.  · There is mild-moderate pulmonary hypertension.  · Moderate posterolateral pericardial  effusion. Trivial under the RA  . Continue Beta Blocker and monitor clinical status closely. Monitor on telemetry. Patient is off CHF pathway.  Monitor strict Is&Os and daily weights.  Place on fluid restriction of 1.5 L. Cardiology has not been consulted. Continue to stress to patient importance of self efficacy and  on diet for CHF. Last BNP reviewed- and noted below   Recent Labs   Lab 04/13/24  1104   BNP >4,900*       Pulmonary hypertension  Fluid removal with HD.      Essential hypertension  On hold duo to sepsis.      VTE Risk Mitigation (From admission, onward)           Ordered     apixaban tablet 2.5 mg  2 times daily         04/13/24 1330                    Discharge Planning   MAVIS:      Code Status: Full Code   Is the patient medically ready for discharge?:     Reason for patient still in hospital (select all that apply): Patient trending condition                     José Miguel Cristina MD  Department of Hospital Medicine   [facility]

## 2024-04-14 NOTE — NURSING
[facility] [place]  Nurses Note -- 4 Eyes      4/14/2024       Skin assessed on: Q Shift      [] No Pressure Injuries Present    []Prevention Measures Documented    [x] Yes LDA  for Pressure Injury Previously documented     [] Yes New Pressure Injury Discovered   [] LDA for New Pressure Injury Added      Attending RN:  Kelly Manning, RN     Second RN:  Sarah Whatley

## 2024-04-14 NOTE — NURSING
[facility] [place]  Nurses Note -- 4 Eyes      4/13/2024       Skin assessed on: Q Shift      [] No Pressure Injuries Present    []Prevention Measures Documented    [x] Yes LDA  for Pressure Injury Previously documented     [] Yes New Pressure Injury Discovered   [] LDA for New Pressure Injury Added      Attending RN:  Sarah Whatley RN     Second RN:  FELIPE Mendoza

## 2024-04-14 NOTE — PLAN OF CARE
Problem: Adult Inpatient Plan of Care  Goal: Absence of Hospital-Acquired Illness or Injury  Outcome: Ongoing, Progressing  Goal: Optimal Comfort and Wellbeing  Outcome: Ongoing, Progressing     Problem: Bleeding (Sepsis/Septic Shock)  Goal: Absence of Bleeding  Outcome: Ongoing, Progressing     Problem: Fall Injury Risk  Goal: Absence of Fall and Fall-Related Injury  Outcome: Ongoing, Progressing

## 2024-04-15 PROBLEM — R40.0 SOMNOLENCE: Status: ACTIVE | Noted: 2024-04-15

## 2024-04-15 LAB
ALLENS TEST: ABNORMAL
AMMONIA PLAS-SCNC: 36 UMOL/L (ref 10–50)
ANION GAP SERPL CALC-SCNC: 26 MMOL/L (ref 8–16)
ASCENDING AORTA: 3.21 CM
AV INDEX (PROSTH): 0.23
AV MEAN GRADIENT: 10 MMHG
AV PEAK GRADIENT: 17 MMHG
AV REGURGITATION PRESSURE HALF TIME: 423.72 MS
AV VALVE AREA BY VELOCITY RATIO: 0.65 CM²
AV VALVE AREA: 0.7 CM²
AV VELOCITY RATIO: 0.21
BASOPHILS # BLD AUTO: 0.12 K/UL (ref 0–0.2)
BASOPHILS NFR BLD: 1.5 % (ref 0–1.9)
BSA FOR ECHO PROCEDURE: 1.47 M2
BUN SERPL-MCNC: 56 MG/DL (ref 6–20)
CALCIUM SERPL-MCNC: 10.8 MG/DL (ref 8.7–10.5)
CHLORIDE SERPL-SCNC: 104 MMOL/L (ref 95–110)
CO2 SERPL-SCNC: 11 MMOL/L (ref 23–29)
CREAT SERPL-MCNC: 6.4 MG/DL (ref 0.5–1.4)
CV ECHO LV RWT: 0.57 CM
DELSYS: ABNORMAL
DIFFERENTIAL METHOD BLD: ABNORMAL
DOP CALC AO PEAK VEL: 2.04 M/S
DOP CALC AO VTI: 29.9 CM
DOP CALC LVOT AREA: 3.1 CM2
DOP CALC LVOT DIAMETER: 1.98 CM
DOP CALC LVOT PEAK VEL: 0.43 M/S
DOP CALC LVOT STROKE VOLUME: 20.93 CM3
DOP CALC MV VTI: 28.6 CM
DOP CALCLVOT PEAK VEL VTI: 6.8 CM
E WAVE DECELERATION TIME: 136.32 MSEC
E/A RATIO: 0.93
E/E' RATIO: 25.5 M/S
ECHO LV POSTERIOR WALL: 1.52 CM (ref 0.6–1.1)
EOSINOPHIL # BLD AUTO: 0.2 K/UL (ref 0–0.5)
EOSINOPHIL NFR BLD: 2.8 % (ref 0–8)
ERYTHROCYTE [DISTWIDTH] IN BLOOD BY AUTOMATED COUNT: 22 % (ref 11.5–14.5)
EST. GFR  (NO RACE VARIABLE): 7 ML/MIN/1.73 M^2
FIO2: 21
FRACTIONAL SHORTENING: 8 % (ref 28–44)
GLUCOSE SERPL-MCNC: 34 MG/DL (ref 70–110)
HCO3 UR-SCNC: 16.4 MMOL/L (ref 24–28)
HCT VFR BLD AUTO: 40.5 % (ref 37–48.5)
HGB BLD-MCNC: 11.7 G/DL (ref 12–16)
IMM GRANULOCYTES # BLD AUTO: 0.07 K/UL (ref 0–0.04)
IMM GRANULOCYTES NFR BLD AUTO: 0.9 % (ref 0–0.5)
INTERVENTRICULAR SEPTUM: 1.33 CM (ref 0.6–1.1)
IVC DIAMETER: 2.3 CM
IVRT: 87.54 MSEC
LA MAJOR: 5.98 CM
LA MINOR: 5.91 CM
LA WIDTH: 4.1 CM
LEFT ATRIUM SIZE: 3.54 CM
LEFT ATRIUM VOLUME INDEX: 49.2 ML/M2
LEFT ATRIUM VOLUME: 73.34 CM3
LEFT INTERNAL DIMENSION IN SYSTOLE: 4.88 CM (ref 2.1–4)
LEFT VENTRICLE DIASTOLIC VOLUME INDEX: 91.39 ML/M2
LEFT VENTRICLE DIASTOLIC VOLUME: 136.17 ML
LEFT VENTRICLE MASS INDEX: 220 G/M2
LEFT VENTRICLE SYSTOLIC VOLUME INDEX: 75.1 ML/M2
LEFT VENTRICLE SYSTOLIC VOLUME: 111.89 ML
LEFT VENTRICULAR INTERNAL DIMENSION IN DIASTOLE: 5.31 CM (ref 3.5–6)
LEFT VENTRICULAR MASS: 328.09 G
LV LATERAL E/E' RATIO: 20.4 M/S
LV SEPTAL E/E' RATIO: 34 M/S
LVOT MG: 0.43 MMHG
LVOT MV: 0.31 CM/S
LYMPHOCYTES # BLD AUTO: 1.6 K/UL (ref 1–4.8)
LYMPHOCYTES NFR BLD: 20 % (ref 18–48)
MCH RBC QN AUTO: 28.1 PG (ref 27–31)
MCHC RBC AUTO-ENTMCNC: 28.9 G/DL (ref 32–36)
MCV RBC AUTO: 97 FL (ref 82–98)
MODE: ABNORMAL
MONOCYTES # BLD AUTO: 1.1 K/UL (ref 0.3–1)
MONOCYTES NFR BLD: 14.3 % (ref 4–15)
MV MEAN GRADIENT: 3 MMHG
MV PEAK A VEL: 1.1 M/S
MV PEAK E VEL: 1.02 M/S
MV PEAK GRADIENT: 6 MMHG
MV STENOSIS PRESSURE HALF TIME: 39.53 MS
MV VALVE AREA BY CONTINUITY EQUATION: 0.73 CM2
MV VALVE AREA P 1/2 METHOD: 5.57 CM2
NEUTROPHILS # BLD AUTO: 4.8 K/UL (ref 1.8–7.7)
NEUTROPHILS NFR BLD: 60.5 % (ref 38–73)
NRBC BLD-RTO: 1 /100 WBC
OHS QRS DURATION: 96 MS
OHS QTC CALCULATION: 447 MS
PCO2 BLDA: 28.8 MMHG (ref 35–45)
PH SMN: 7.37 [PH] (ref 7.35–7.45)
PISA AR MAX VEL: 2.52 M/S
PISA TR MAX VEL: 2.26 M/S
PLATELET # BLD AUTO: 189 K/UL (ref 150–450)
PMV BLD AUTO: 11 FL (ref 9.2–12.9)
PO2 BLDA: 47 MMHG (ref 80–100)
POC BE: -8 MMOL/L
POC SATURATED O2: 82 % (ref 95–100)
POC TCO2: 17 MMOL/L (ref 23–27)
POCT GLUCOSE: 170 MG/DL (ref 70–110)
POCT GLUCOSE: 185 MG/DL (ref 70–110)
POCT GLUCOSE: 33 MG/DL (ref 70–110)
POCT GLUCOSE: 34 MG/DL (ref 70–110)
POCT GLUCOSE: 44 MG/DL (ref 70–110)
POCT GLUCOSE: 73 MG/DL (ref 70–110)
POCT GLUCOSE: 75 MG/DL (ref 70–110)
POCT GLUCOSE: 83 MG/DL (ref 70–110)
POCT GLUCOSE: 87 MG/DL (ref 70–110)
POTASSIUM SERPL-SCNC: 5.1 MMOL/L (ref 3.5–5.1)
PV PEAK GRADIENT: 3 MMHG
PV PEAK VELOCITY: 0.93 M/S
RA MAJOR: 5.96 CM
RA WIDTH: 5.4 CM
RBC # BLD AUTO: 4.16 M/UL (ref 4–5.4)
RIGHT VENTRICULAR END-DIASTOLIC DIMENSION: 4.26 CM
RV TISSUE DOPPLER FREE WALL SYSTOLIC VELOCITY 1 (APICAL 4 CHAMBER VIEW): 11.56 CM/S
SAMPLE: ABNORMAL
SINUS: 3.35 CM
SITE: ABNORMAL
SODIUM SERPL-SCNC: 141 MMOL/L (ref 136–145)
STJ: 2.26 CM
TDI LATERAL: 0.05 M/S
TDI SEPTAL: 0.03 M/S
TDI: 0.04 M/S
TR MAX PG: 20 MMHG
TRICUSPID ANNULAR PLANE SYSTOLIC EXCURSION: 2.12 CM
TSH SERPL DL<=0.005 MIU/L-ACNC: 3.16 UIU/ML (ref 0.4–4)
VANCOMYCIN SERPL-MCNC: 26.1 UG/ML
WBC # BLD AUTO: 7.99 K/UL (ref 3.9–12.7)
Z-SCORE OF LEFT VENTRICULAR DIMENSION IN END DIASTOLE: 1.83
Z-SCORE OF LEFT VENTRICULAR DIMENSION IN END SYSTOLE: 4.55

## 2024-04-15 PROCEDURE — 99223 1ST HOSP IP/OBS HIGH 75: CPT | Mod: GV,,, | Performed by: NURSE PRACTITIONER

## 2024-04-15 PROCEDURE — 80048 BASIC METABOLIC PNL TOTAL CA: CPT | Performed by: HOSPITALIST

## 2024-04-15 PROCEDURE — 25000003 PHARM REV CODE 250: Performed by: HOSPITALIST

## 2024-04-15 PROCEDURE — 82803 BLOOD GASES ANY COMBINATION: CPT

## 2024-04-15 PROCEDURE — 84443 ASSAY THYROID STIM HORMONE: CPT | Performed by: HOSPITALIST

## 2024-04-15 PROCEDURE — 25000003 PHARM REV CODE 250: Performed by: INTERNAL MEDICINE

## 2024-04-15 PROCEDURE — 36600 WITHDRAWAL OF ARTERIAL BLOOD: CPT

## 2024-04-15 PROCEDURE — 92610 EVALUATE SWALLOWING FUNCTION: CPT

## 2024-04-15 PROCEDURE — 80202 ASSAY OF VANCOMYCIN: CPT | Performed by: HOSPITALIST

## 2024-04-15 PROCEDURE — 63600175 PHARM REV CODE 636 W HCPCS: Performed by: INTERNAL MEDICINE

## 2024-04-15 PROCEDURE — 99900035 HC TECH TIME PER 15 MIN (STAT)

## 2024-04-15 PROCEDURE — 5A1D70Z PERFORMANCE OF URINARY FILTRATION, INTERMITTENT, LESS THAN 6 HOURS PER DAY: ICD-10-PCS | Performed by: INTERNAL MEDICINE

## 2024-04-15 PROCEDURE — 20000000 HC ICU ROOM

## 2024-04-15 PROCEDURE — G0425 INPT/ED TELECONSULT30: HCPCS | Mod: 95,GW,GC,HPC | Performed by: PSYCHIATRY & NEUROLOGY

## 2024-04-15 PROCEDURE — 25500020 PHARM REV CODE 255: Performed by: HOSPITALIST

## 2024-04-15 PROCEDURE — 80100014 HC HEMODIALYSIS 1:1

## 2024-04-15 PROCEDURE — 82140 ASSAY OF AMMONIA: CPT | Performed by: HOSPITALIST

## 2024-04-15 PROCEDURE — 36415 COLL VENOUS BLD VENIPUNCTURE: CPT | Performed by: HOSPITALIST

## 2024-04-15 PROCEDURE — 85025 COMPLETE CBC W/AUTO DIFF WBC: CPT | Performed by: HOSPITALIST

## 2024-04-15 PROCEDURE — 63600175 PHARM REV CODE 636 W HCPCS

## 2024-04-15 RX ORDER — NALOXONE HCL 0.4 MG/ML
0.4 VIAL (ML) INJECTION ONCE
Status: COMPLETED | OUTPATIENT
Start: 2024-04-15 | End: 2024-04-15

## 2024-04-15 RX ORDER — IBUPROFEN 200 MG
24 TABLET ORAL
Status: DISCONTINUED | OUTPATIENT
Start: 2024-04-15 | End: 2024-04-21 | Stop reason: HOSPADM

## 2024-04-15 RX ORDER — GLUCAGON 1 MG
1 KIT INJECTION
Status: DISCONTINUED | OUTPATIENT
Start: 2024-04-15 | End: 2024-04-21 | Stop reason: HOSPADM

## 2024-04-15 RX ORDER — IBUPROFEN 200 MG
16 TABLET ORAL
Status: DISCONTINUED | OUTPATIENT
Start: 2024-04-15 | End: 2024-04-21 | Stop reason: HOSPADM

## 2024-04-15 RX ORDER — INSULIN ASPART 100 [IU]/ML
0-5 INJECTION, SOLUTION INTRAVENOUS; SUBCUTANEOUS
Status: DISCONTINUED | OUTPATIENT
Start: 2024-04-15 | End: 2024-04-15

## 2024-04-15 RX ORDER — NALOXONE HCL 0.4 MG/ML
VIAL (ML) INJECTION
Status: COMPLETED
Start: 2024-04-15 | End: 2024-04-15

## 2024-04-15 RX ADMIN — Medication 0.4 MG: at 10:04

## 2024-04-15 RX ADMIN — CEFEPIME 1 G: 1 INJECTION, POWDER, FOR SOLUTION INTRAMUSCULAR; INTRAVENOUS at 09:04

## 2024-04-15 RX ADMIN — LEVOTHYROXINE SODIUM 75 MCG: 75 TABLET ORAL at 06:04

## 2024-04-15 RX ADMIN — MIDODRINE HYDROCHLORIDE 10 MG: 5 TABLET ORAL at 08:04

## 2024-04-15 RX ADMIN — IOHEXOL 75 ML: 350 INJECTION, SOLUTION INTRAVENOUS at 11:04

## 2024-04-15 RX ADMIN — NALOXONE HYDROCHLORIDE 0.4 MG: 0.4 INJECTION, SOLUTION INTRAMUSCULAR; INTRAVENOUS; SUBCUTANEOUS at 10:04

## 2024-04-15 RX ADMIN — MUPIROCIN: 20 OINTMENT TOPICAL at 08:04

## 2024-04-15 NOTE — TELEMEDICINE CONSULT
[facility]  Vascular Neurology  Comprehensive Stroke Center  TeleVascular Neurology Acute Consultation Note        Consult Information  Consults    Consulting Provider: JALEESA ROLDAN   Current Providers  No providers found    Patient Location:  [facility] ICU Emergency Department    Spoke hospital nurse at bedside with patient assisting consultant.  Patient information was obtained from primary team.       Stroke Documentation  Acute Stroke Times   Unknown Normal Date: Unknown Date  Last Known Normal Time:  (found confused and drawsy 7am 4/15/2024)  Unknown Normal Time: Unknown Time  Stroke Team Called Date: 04/15/24  Stroke Team Called Time: 1011  Stroke Team Arrival Date: 04/15/24  Stroke Team Arrival Time: 1015  CT Interpretation Time: 1012  Thrombolytic Therapy Recommended: No    NIH Scale:  1a. Level of Consciousness: 1-->Not alert, but arousable by minor stimulation to obey, answer, or respond  1b. LOC Questions: 2-->Answers neither question correctly  1c. LOC Commands: 1-->Performs one task correctly  2. Best Gaze: 0-->Normal  3. Visual: 0-->No visual loss (able to track but due to mental status difficult exam)  4. Facial Palsy: 0-->Normal symmetrical movements  5a. Motor Arm, Left: 0-->No drift, limb holds 90 (or 45) degrees for full 10 secs  5b. Motor Arm, Right: 0-->No drift, limb holds 90 (or 45) degrees for full 10 secs  6a. Motor Leg, Left: 0-->No drift, leg holds 30 degree position for full 5 secs  6b. Motor Leg, Right: 0-->No drift, leg holds 30 degree position for full 5 secs  7. Limb Ataxia: 0-->Absent  8. Sensory: 0-->Normal, no sensory loss (no answer questions but withdrew)  9. Best Language: 3-->Mute, global aphasia, no usable speech or auditory comprehension  10. Dysarthria: 2-->Severe dysarthria, patients speech is so slurred as to be unintelligible in the absence of or out of proportion to any dysphasia, or is mute/anarthric  11. Extinction and Inattention (formerly  "Neglect): 0-->No abnormality  Total (NIH Stroke Scale): 9      Modified Marshall:    Chad Coma Scale:     ABCD2 Score:    [id number] Score:    HAS -BLED Score:    ICH Score:    Hunt & Jones Classification:      Blood pressure 127/81, pulse 81, temperature 97.7 °F (36.5 °C), temperature source Oral, resp. rate 18, height 5' 4" (1.626 m), weight 47.6 kg (104 lb 14.4 oz), last menstrual period 10/24/2016, SpO2 100%.    Van Negative    Medical Decision Making  History of Present Illness:   Eva Bloom is a 58 y.o.  female  , with a PMHx of Afib on eliquis 2.5 mg BID, CHF NYHA class II, chronic, systolic, CVA, ESRD on dialysis (T-T-S)  HTN,  Renal , Hyperparathyroidism and Prediabetes.admitted for fever 100.8 and tachycardia.though sepsis due to line infection. Sepsis work up unrevealing so far on ABX.   stroke code called  for acute encephalopathy and difficult to arouse . LKN unclear found like this 7AM today . /88 and BG 48, respectively. NIHSS 9. Not TNK candidate due to  on blood thinner and unclear LKW . CTA not done yet but low susp for LVO       Images personally reviewed and interpreted:  Study: Head CT  Study Interpretation:    No acute hypodensity or hyperdensity     Assessment and plan:  Acute change in mental status and lethargy  Patient found to be hypoglycemic around 7am received glucose BG went up to 108 but patient still drowsy. Now able to open eye to voice , track and follow commands, move all extremities but lethargic and require repetitive stimulation. Suspect mimic due to metabolic (Hypoglycemic) and infection etiology.     -Recommend  CTA recommend to r/o LOV.   -CBC/CMP, VBG, Ammonia level  -r/o HF exacerbation   -if continue to be drowsy with no etiology consider EEG to r/o seizure   -Continue eliquis     Please contact VN for CTA results to review     Lytics recommendation: Thrombolytic therapy not recommended due to Outside of treatment window  and Full dose anticoagulation "   Thrombectomy recommendation: Awaiting CTA results from Spoke for determination   Placement recommendation: pending further studies               ROS  Physical Exam  Past Medical History:   Diagnosis Date    Anemia     Anticoagulant long-term use     Atrial fibrillation     Bronchitis 03/01/2017    Cancer 2016    kidney cancer    CHF (congestive heart failure), NYHA class II, chronic, systolic     CMV (cytomegalovirus) antibody positive     CVA (cerebral vascular accident) 1/3/2024    Encounter for blood transfusion     ESRD (end stage renal disease)     Essential hypertension 09/23/2015    H/O herpes simplex type 2 infection     Herpes simplex type 1 antibody positive     History of kidney cancer     s/p left nephrectomy 1/2016    Hyperparathyroidism, unspecified     Hyperuricemia without signs of inflammatory arthritis and tophaceous disease     Kidney stones     LGSIL (low grade squamous intraepithelial dysplasia)     Myocardiopathy 07/21/2017    Prediabetes     Proteinuria     Renal disorder     Thyroid nodule     Urate nephropathy      Past Surgical History:   Procedure Laterality Date    BREAST CYST EXCISION      COLONOSCOPY N/A 11/12/2015    COLONOSCOPY N/A 03/12/2021    Procedure: COLONOSCOPY;  Surgeon: Brendon Lanier MD;  Location: [facility];  Service: Endoscopy;  Laterality: N/A;  covid test 3/9, labs prior, prep instr mailed -ml    EXCISION OF ARTERIOVENOUS FISTULA Left 09/27/2023    Procedure: EXCISION, AV FISTULA;  Surgeon: FARIDEH Muñoz III, MD;  Location: [facility] OR [address] [place] [address];  Service: Vascular;  Laterality: Left;  LUE AV graft excision    INSERTION OF BIVENTRICULAR IMPLANTABLE CARDIOVERTER-DEFIBRILLATOR (ICD)  04/2021    INSERTION OF TUNNELED CENTRAL VENOUS CATHETER (CVC) WITH SUBCUTANEOUS PORT N/A 01/25/2023    Procedure: INSERTION, DUAL LUMEN CATHETER WITH PORT, WITH IMAGING GUIDANCE;  Surgeon: FARIDEH Muñoz III, MD;  Location: [facility] OR [address] [place] [address];  Service: Peripheral Vascular;  Laterality:  N/A;  possinble permcath placment    NEPHRECTOMY-LAPAROSCOPIC Left 01/12/2016    PERCUTANEOUS TRANSLUMINAL ANGIOPLASTY OF ARTERIOVENOUS FISTULA Left 04/19/2023    Procedure: PTA, AV FISTULA;  Surgeon: FARIDEH Muñoz III, MD;  Location: [facility] CATH LAB;  Service: Peripheral Vascular;  Laterality: Left;    PERITONEAL CATHETER INSERTION      Permacath insertion  01/12/2017    PLACEMENT OF ARTERIOVENOUS GRAFT  12/28/2022    Procedure: INSERTION, GRAFT, ARTERIOVENOUS;  Surgeon: FARIDEH Muñoz III, MD;  [facility];  Service: Peripheral Vascular;;    REMOVAL, GRAFT, ARTERIOVENOUS, UPPER EXTREMITY Left 01/25/2023    Procedure: REMOVAL, GRAFT, ARTERIOVENOUS, UPPER EXTREMITY;  Surgeon: FARIDEH Muñoz III, MD;  [facility];  Service: Peripheral Vascular;  Laterality: Left;    REVISION OF ARTERIOVENOUS FISTULA Left 05/01/2023    Procedure: REVISION, AV FISTULA;  Surgeon: FARIDEH Muñoz III, MD;  [facility];  Service: Peripheral Vascular;  Laterality: Left;  LUE AVG revision    REVISION OF PROCEDURE INVOLVING ARTERIOVENOUS GRAFT Left 12/28/2022    Procedure: REVISION, PROCEDURE INVOLVING ARTERIOVENOUS GRAFT;  Surgeon: FARIDEH Muñoz III, MD;  [facility];  Service: Peripheral Vascular;  Laterality: Left;    REVISION OF PROCEDURE INVOLVING ARTERIOVENOUS GRAFT Left 07/21/2023    Procedure: LEFT ARM ARTERIOVENOUS GRAFT EXCISION  AND LIGATION;  Surgeon: Obi Werner MD;  Location: [facility];  Service: Vascular;  Laterality: Left;  Left AVG excision and revision with interposition    SALPINGOOPHORECTOMY Right 2016    KJB---DAVINCI    TONSILLECTOMY      TUBAL LIGATION       Family History   Problem Relation Name Age of Onset    Hypertension Mother      Diabetes Father      Kidney disease Father      No Known Problems Sister Sophy     Heart disease Sister Maria A     No Known Problems Sister Claudine     No Known Problems Brother Dax     No Known Problems Brother Don      Cataracts Maternal Aunt      No Known Problems Maternal Uncle      No Known Problems Paternal Aunt      No Known Problems Paternal Uncle      No Known Problems Maternal Grandmother      Diabetes Maternal Grandfather      No Known Problems Paternal Grandmother      No Known Problems Paternal Grandfather      No Known Problems Son      No Known Problems Son      No Known Problems Other      Breast cancer Neg Hx      Colon cancer Neg Hx      Cancer Neg Hx      Stroke Neg Hx      Amblyopia Neg Hx      Blindness Neg Hx      Glaucoma Neg Hx      Macular degeneration Neg Hx      Retinal detachment Neg Hx      Strabismus Neg Hx      Thyroid disease Neg Hx         Diagnoses  Problem Noted   Somnolence 4/15/2024       Analia Sutton MD      Emergent/Acute neurological consultation requested by spoke provider due to critical concerns for possible cerebrovascular event that could result in permanent loss of neurologic/bodily function, severe disability or death of this patient.  Immediate/timely evaluation by a highly prepared expert is paramount for optimal outcomes  High risk for neurological deterioration if not properly diagnosed  High risk for neurological deterioration if not treated promplty/as soon as possible  Complex diagnostic evaluation may be required (advanced imaging)  High risk treatment options (thrombolytics and/or thrombectomy)    Patient care was coordinated with spoke provider, including but not limted to    Discussing likely diagnosis/etiology of symptoms  Making recommendations for further diagnostic studies  Making recommendations for intravenous thrombolytics or other advanced therapies  Making recommendations for disposition (admission/transfer for higher level of care)

## 2024-04-15 NOTE — NURSING
" Lab called Critical result: Glucose 34 and did Glucometer check: 43. Informed MD Keyla, no PRN orders, and gave pt 1 orange juice and drink all of it. Pt Alert and oriented. Per MD "Just please check later another blood glucose".  "

## 2024-04-15 NOTE — SUBJECTIVE & OBJECTIVE
Interval History: Patient is lethargic today,code stroke was called,transfered to ICU,head CT show no acute process,evaluated by Tele neuro,will have CTA head,check ammonia and TSH,hypoglycemia is resolved,will monitor closely.    Review of Systems   Constitutional:  Positive for activity change and appetite change.   HENT:  Negative for congestion and dental problem.    Respiratory:  Negative for apnea and chest tightness.    Gastrointestinal:  Negative for abdominal distention.   Neurological:  Positive for weakness.   Psychiatric/Behavioral:  Positive for confusion.      Objective:     Vital Signs (Most Recent):  Temp: 97.7 °F (36.5 °C) (04/15/24 0734)  Pulse: 81 (04/15/24 0957)  Resp: 18 (04/15/24 0734)  BP: 127/81 (04/15/24 0957)  SpO2: 100 % (04/15/24 0957) Vital Signs (24h Range):  Temp:  [96.9 °F (36.1 °C)-97.7 °F (36.5 °C)] 97.7 °F (36.5 °C)  Pulse:  [79-89] 81  Resp:  [18-19] 18  SpO2:  [96 %-100 %] 100 %  BP: (112-127)/(66-88) 127/81     Weight: 47.6 kg (104 lb 14.4 oz)  Body mass index is 18.01 kg/m².    Intake/Output Summary (Last 24 hours) at 4/15/2024 1125  Last data filed at 4/15/2024 0814  Gross per 24 hour   Intake 533.39 ml   Output --   Net 533.39 ml         Physical Exam  Cardiovascular:      Rate and Rhythm: Normal rate.   Pulmonary:      Effort: No respiratory distress.   Skin:     Coloration: Skin is not jaundiced.      Findings: No bruising.   Neurological:      Mental Status: She is alert.      Motor: Weakness present.             Significant Labs: All pertinent labs within the past 24 hours have been reviewed.  BMP:   Recent Labs   Lab 04/15/24  0454   GLU 34*      K 5.1      CO2 11*   BUN 56*   CREATININE 6.4*   CALCIUM 10.8*     CBC:   Recent Labs   Lab 04/14/24  0441 04/15/24  0454   WBC 7.97 7.99   HGB 11.1* 11.7*   HCT 37.2 40.5   * 189     CMP:   Recent Labs   Lab 04/14/24  0441 04/15/24  0454    141   K 4.9 5.1    104   CO2 14* 11*   GLU 54* 34*   BUN  38* 56*   CREATININE 4.9* 6.4*   CALCIUM 10.3 10.8*   ANIONGAP 23* 26*       Significant Imaging: I have reviewed all pertinent imaging results/findings within the past 24 hours.

## 2024-04-15 NOTE — NURSING
Pt CBG low. Sarah RN gave x6 packets of sugar in juice per MD order. No further orders received.    0741: DANIELLAG recheck :83

## 2024-04-15 NOTE — PROGRESS NOTES
Pharmacokinetic Assessment Follow Up: IV Vancomycin    Vancomycin serum concentration assessment(s):    The random level was drawn correctly and can be used to guide therapy at this time. The measurement is above the desired definitive target range of 10 to 20 mcg/mL.    Vancomycin Regimen Plan:    No dose today.  Re-dose when the random level is less than 20 mcg/mL, next level to be drawn at 0400 on 4/16/2024    Drug levels (last 3 results):  Recent Labs   Lab Result Units 04/14/24  0441 04/15/24  0454   Vancomycin, Random ug/mL 17.7 26.1       Pharmacy will continue to follow and monitor vancomycin.    Please contact pharmacy at extension [phone] for questions regarding this assessment.    Thank you for the consult,   Eddie Brooks Jr       Patient brief summary:  Eva Bloom is a 58 y.o. female initiated on antimicrobial therapy with IV Vancomycin for treatment of sepsis    Drug Allergies:   Review of patient's allergies indicates:   Allergen Reactions    Carvedilol Other (See Comments)     Nausea/vomiting    Allopurinol      Other reaction(s): abnormal transaminases       Actual Body Weight:   47.6 kg    Renal Function:   Estimated Creatinine Clearance: 7.1 mL/min (A) (based on SCr of 6.4 mg/dL (H)).,     Dialysis Method (if applicable):  intermittent HD    CBC (last 72 hours):  Recent Labs   Lab Result Units 04/13/24  1104 04/14/24  0441 04/15/24  0454   WBC K/uL 4.12 7.97 7.99   Hemoglobin g/dL 12.9 11.1* 11.7*   Hematocrit % 42.4 37.2 40.5   Platelets K/uL 152 143* 189   Gran % % 64.8 67.1 60.5   Lymph % % 19.9 16.1* 20.0   Mono % % 9.2 13.3 14.3   Eosinophil % % 4.4 1.9 2.8   Basophil % % 1.2 0.8 1.5   Differential Method  Automated Automated Automated       Metabolic Panel (last 72 hours):  Recent Labs   Lab Result Units 04/13/24  1104 04/14/24  0441 04/15/24  0454   Sodium mmol/L 139 141 141   Potassium mmol/L 3.9 4.9 5.1   Chloride mmol/L 102 104 104   CO2 mmol/L 19* 14* 11*   Glucose mg/dL 65* 54*  34*   BUN mg/dL 22* 38* 56*   Creatinine mg/dL 3.7* 4.9* 6.4*   Albumin g/dL 3.5  --   --    Total Bilirubin mg/dL 1.2*  --   --    Alkaline Phosphatase U/L 509*  --   --    AST U/L 44*  --   --    ALT U/L 22  --   --    Magnesium mg/dL 2.4  --   --    Phosphorus mg/dL 4.0  --   --        Vancomycin Administrations:  vancomycin given in the last 96 hours                     vancomycin (VANCOCIN) 500 mg in dextrose 5 % in water (D5W) 100 mL IVPB (MB+) (mg) 500 mg New Bag 04/14/24 0740    vancomycin (VANCOCIN) 1,000 mg in dextrose 5 % (D5W) 250 mL IVPB (Vial-Mate) ()  Restarted 04/13/24 1458      Restarted  1423     1,000 mg New Bag  1335                    Microbiologic Results:  Microbiology Results (last 7 days)       Procedure Component Value Units Date/Time    Blood culture x two cultures. Draw prior to antibiotics. [[id number]] Collected: 04/13/24 1126    Order Status: Completed Specimen: Blood from Peripheral, Hand, Right Updated: 04/14/24 1503     Blood Culture, Routine No Growth to date      No Growth to date    Narrative:      Aerobic and anaerobic    Blood culture x two cultures. Draw prior to antibiotics. [[id number]] Collected: 04/13/24 1103    Order Status: Completed Specimen: Blood from Peripheral, Forearm, Right Updated: 04/14/24 1503     Blood Culture, Routine No Growth to date      No Growth to date    Narrative:      Aerobic and anaerobic

## 2024-04-15 NOTE — PLAN OF CARE
Case Management Assessment     PCP: Sowmya Mccain MD    Pharmacy: [facility]      Patient Arrived From: Home  Existing Help at Home:     Barriers to Discharge: Patient from home with . Has equipment, no home medical services. Dialysis at [facility].     Discharge Plan:    A. Home   B. Home       04/15/24 1643   Discharge Assessment   Assessment Type Discharge Planning Assessment   Confirmed/corrected address, phone number and insurance Yes   Confirmed Demographics Correct on Facesheet   Source of Information patient;health record   Communicated MAVIS with patient/caregiver Date not available/Unable to determine   Reason For Admission Sepsis   People in Home spouse   Do you expect to return to your current living situation? Yes   Do you have help at home or someone to help you manage your care at home? Yes   Who are your caregiver(s) and their phone number(s)? Otis Bloom (Spouse)  [phone] (Mobile)   Prior to hospitilization cognitive status: Alert/Oriented   Current cognitive status: Alert/Oriented   Equipment Currently Used at Home walker, rolling;bedside commode;shower chair;wheelchair   Readmission within 30 days? No   Patient currently being followed by outpatient case management? No   Do you currently have service(s) that help you manage your care at home? No   Do you take prescription medications? Yes   Do you have prescription coverage? Yes   Coverage Medicare AB   Do you have any problems affording any of your prescribed medications? No   Is the patient taking medications as prescribed? yes   How do you get to doctors appointments? car, drives self;family or friend will provide   Are you on dialysis? Yes   Dialysis Name and Scheduled days [facility]   Do you take coumadin? No   Discharge Plan A Home   Discharge Plan B Home   DME Needed Upon Discharge  none   Discharge Plan discussed with: Patient   Transition of Care Barriers None   OTHER   Name(s) of People in Home  Otis Bloom (Spouse)  [phone] (Mobile)

## 2024-04-15 NOTE — PT/OT/SLP EVAL
Speech Language Pathology Evaluation  Bedside Swallow    Patient Name:  Eva Bloom   MRN:  [id number]  Admitting Diagnosis: Sepsis    Recommendations:                 General Recommendations:  Dysphagia therapy  Diet recommendations:   ,   NPO except ice chips for stimulation with good oral care  Aspiration Precautions: 1 bite/sip at a time   General Precautions: Standard,    Communication strategies:  provide increased time to answer    Assessment:     Eva Bloom is a 58 y.o. female with dx of sepsis her current DOREEN is not appropriate for meaningful po intake.     History:     Past Medical History:   Diagnosis Date    Anemia     Anticoagulant long-term use     Atrial fibrillation     Bronchitis 03/01/2017    Cancer 2016    kidney cancer    CHF (congestive heart failure), NYHA class II, chronic, systolic     CMV (cytomegalovirus) antibody positive     CVA (cerebral vascular accident) 1/3/2024    Encounter for blood transfusion     ESRD (end stage renal disease)     Essential hypertension 09/23/2015    H/O herpes simplex type 2 infection     Herpes simplex type 1 antibody positive     History of kidney cancer     s/p left nephrectomy 1/2016    Hyperparathyroidism, unspecified     Hyperuricemia without signs of inflammatory arthritis and tophaceous disease     Kidney stones     LGSIL (low grade squamous intraepithelial dysplasia)     Myocardiopathy 07/21/2017    Prediabetes     Proteinuria     Renal disorder     Thyroid nodule     Urate nephropathy        Past Surgical History:   Procedure Laterality Date    BREAST CYST EXCISION      COLONOSCOPY N/A 11/12/2015    COLONOSCOPY N/A 03/12/2021    Procedure: COLONOSCOPY;  Surgeon: Brendon Lanier MD;  Location: [facility];  Service: Endoscopy;  Laterality: N/A;  covid test 3/9, labs prior, prep instr mailed -ml    EXCISION OF ARTERIOVENOUS FISTULA Left 09/27/2023    Procedure: EXCISION, AV FISTULA;  Surgeon: FARIDEH Muñoz III, MD;  Location: [facility] OR [address] [place] [address];   Service: Vascular;  Laterality: Left;  LUE AV graft excision    INSERTION OF BIVENTRICULAR IMPLANTABLE CARDIOVERTER-DEFIBRILLATOR (ICD)  04/2021    INSERTION OF TUNNELED CENTRAL VENOUS CATHETER (CVC) WITH SUBCUTANEOUS PORT N/A 01/25/2023    Procedure: INSERTION, DUAL LUMEN CATHETER WITH PORT, WITH IMAGING GUIDANCE;  Surgeon: FARIDEH Muñoz III, MD;  Location: [facility] OR [address] [place] [address];  Service: Peripheral Vascular;  Laterality: N/A;  possinble permcath placment    NEPHRECTOMY-LAPAROSCOPIC Left 01/12/2016    PERCUTANEOUS TRANSLUMINAL ANGIOPLASTY OF ARTERIOVENOUS FISTULA Left 04/19/2023    Procedure: PTA, AV FISTULA;  Surgeon: FARIDEH Muñoz III, MD;  Location: [facility] CATH LAB;  Service: Peripheral Vascular;  Laterality: Left;    PERITONEAL CATHETER INSERTION      Permacath insertion  01/12/2017    PLACEMENT OF ARTERIOVENOUS GRAFT  12/28/2022    Procedure: INSERTION, GRAFT, ARTERIOVENOUS;  Surgeon: FARIDEH Muñoz III, MD;  [facility];  Service: Peripheral Vascular;;    REMOVAL, GRAFT, ARTERIOVENOUS, UPPER EXTREMITY Left 01/25/2023    Procedure: REMOVAL, GRAFT, ARTERIOVENOUS, UPPER EXTREMITY;  Surgeon: FARIDEH Muñoz III, MD;  [facility];  Service: Peripheral Vascular;  Laterality: Left;    REVISION OF ARTERIOVENOUS FISTULA Left 05/01/2023    Procedure: REVISION, AV FISTULA;  Surgeon: FARIDEH Muñoz III, MD;  [facility];  Service: Peripheral Vascular;  Laterality: Left;  LUE AVG revision    REVISION OF PROCEDURE INVOLVING ARTERIOVENOUS GRAFT Left 12/28/2022    Procedure: REVISION, PROCEDURE INVOLVING ARTERIOVENOUS GRAFT;  Surgeon: FARIDEH Muñoz III, MD;  [facility];  Service: Peripheral Vascular;  Laterality: Left;    REVISION OF PROCEDURE INVOLVING ARTERIOVENOUS GRAFT Left 07/21/2023    Procedure: LEFT ARM ARTERIOVENOUS GRAFT EXCISION  AND LIGATION;  Surgeon: Obi Werner MD;  Location: [facility] OR;  Service: Vascular;  Laterality: Left;  Left AVG  excision and revision with interposition    SALPINGOOPHORECTOMY Right 2016    KJB---DAVINCI    TONSILLECTOMY      TUBAL LIGATION         Social History: Patient lives with family    Chest X-Rays: 4/15 Mild pulmonary vascular engorgement and prominent interstitial markings, as before.     Prior diet: unable to report    Subjective   Pt grossly non verbal with eyes open unable to follow commands at this time. Nursing reporting she was unable to swallow meds secondary to confusion.   Patient goals: unable to report    Pain/Comfort:  Pain Rating 1: 0/10    Respiratory Status: Nasal cannula, flow 2 L/min    Objective:     Oral Musculature Evaluation  Oral Musculature: unable to assess due to poor participation/comprehension    Bedside Swallow Eval:   Consistencies Assessed:  Ice chips X1    Oral Phase:   Minimal oral response    Pharyngeal Phase:   Unable to assess    Compensatory Strategies  None    Treatment: will follow    Goals:   Multidisciplinary Problems       SLP Goals          Problem: SLP    Goal Priority Disciplines Outcome   SLP Goal    Medium SLP Ongoing, Progressing   Description: Pt will participate in ongoing assessment of swallow                       Plan:     Patient to be seen:    x2  Plan of Care expires:   4/20/24  Plan of Care reviewed with:    nursing  SLP Follow-Up:     yes     Discharge recommendations:    TBD  Barriers to Discharge:  None    Time Tracking:     SLP Treatment Date:   04/15/24  Speech Start Time:  1144  Speech Stop Time:  1154     Speech Total Time (min):  10 min    Billable Minutes: Eval Swallow and Oral Function 10    04/15/2024

## 2024-04-15 NOTE — NURSING
Resp completely Mixed gas, unable to get regular ABG. Pt with AMS. Grossly nonverbal and confused. MD at bedside aware of pt condition.

## 2024-04-15 NOTE — NURSING
[facility]  Nurses Note -- 4 Eyes      4/14/2024       Skin assessed on: Q Shift      [] No Pressure Injuries Present    []Prevention Measures Documented    [x] Yes LDA  for Pressure Injury Previously documented     [] Yes New Pressure Injury Discovered   [] LDA for New Pressure Injury Added      Attending RN:  Sarah Whatley RN     Second RN:  FELIPE Mendoza

## 2024-04-15 NOTE — NURSING
Rapid response called for AMS. Pt grossly nonberbal and disoriented. Not really following commands. MD at bedside recommended code stroke. Code stroke was then paged overhead and pt was brought to CT scan and then to ICU for tele stroke neuro. Pt unable to complete stroke scale because unable to follow commands.

## 2024-04-15 NOTE — CARE UPDATE
Patient moved to ICU for higher level of care.     Previous request for thoracentesis discussed with Pulm/CCM NP. Pulm/CCM has already evaluated patient with US and determined that there is no pleural fluid present that is amenable for safe thoracentesis.     Procedure order cancelled.     Please reach out to Interventional Radiology if patient's condition changes and re-evaluation for possible thoracentesis is desired.       Kaia West NP  Interventional Radiology

## 2024-04-15 NOTE — SUBJECTIVE & OBJECTIVE
History of Present Illness:   Eva Bloom is a 58 y.o.  female  , with a PMHx of Afib on eliquis 2.5 mg BID, CHF NYHA class II, chronic, systolic, CVA, ESRD on dialysis (T-T-S)  HTN,  Renal , Hyperparathyroidism and Prediabetes.admitted for fever 100.8 and tachycardia.though sepsis due to line infection. Sepsis work up unrevealing so far on ABX.   stroke code called  for acute encephalopathy and difficult to arouse . LKN unclear found like this 7AM today . /88 and BG 48, respectively. NIHSS 9. Not TNK candidate due to  on blood thinner and unclear LKW . CTA not done yet but low susp for LVO       Images personally reviewed and interpreted:  Study: Head CT  Study Interpretation:    No acute hypodensity or hyperdensity     Assessment and plan:  Acute change in mental status and lethargy  Patient found to be hypoglycemic around 7am received glucose BG went up to 108 but patient still drowsy. Now able to open eye to voice , track and follow commands, move all extremities but lethargic and require repetitive stimulation. Suspect mimic due to metabolic (Hypoglycemic) and infection etiology.     -Recommend  CTA recommend to r/o LOV.   -CBC/CMP, VBG, Ammonia level  -r/o HF exacerbation   -if continue to be drowsy with no etiology consider EEG to r/o seizure   -Continue eliquis     Please contact VN for CTA results to review     Lytics recommendation: Thrombolytic therapy not recommended due to Outside of treatment window  and Full dose anticoagulation   Thrombectomy recommendation: Awaiting CTA results from [facility]oke for determination   Placement recommendation: pending further studies

## 2024-04-15 NOTE — PLAN OF CARE
Pt's current DOREEN is not appropriate for significant po. ST RECS NPO except ice chips for stimulation with good oral care. Kimmy Gaming, CCC-SLP

## 2024-04-15 NOTE — CONSULTS
Interventional Radiology   Consult History & Physical      Date: 4/15/2024   Primary team: Networked reference to record PCT , José Miguel Cristina, *   Room/bed: W407/W407 A    Inpatient consult to Interventional Radiology  Consult performed by: Kaia West, TED  Consult ordered by: José Miguel Cristina MD           Reason for Consult:   Request for left side thoracentesis    History of Present Illness:  Eva Bloom is a 58 y.o. female with a fib (on eliquis), HFrEF (LVEF 23%), ESRD (on HD), HTN, and previous kidney cancer who was admitted on 4/13 for sepsis. Patient was started on empiric broad spectrum antibiotics. Bcx from 4/13 have been NGTD.    Interventional Radiology has been consulted for diagnostic left side thoracentesis.     Past Medical History:  Past Medical History:   Diagnosis Date    Anemia     Anticoagulant long-term use     Atrial fibrillation     Bronchitis 03/01/2017    Cancer 2016    kidney cancer    CHF (congestive heart failure), NYHA class II, chronic, systolic     CMV (cytomegalovirus) antibody positive     CVA (cerebral vascular accident) 1/3/2024    Encounter for blood transfusion     ESRD (end stage renal disease)     Essential hypertension 09/23/2015    H/O herpes simplex type 2 infection     Herpes simplex type 1 antibody positive     History of kidney cancer     s/p left nephrectomy 1/2016    Hyperparathyroidism, unspecified     Hyperuricemia without signs of inflammatory arthritis and tophaceous disease     Kidney stones     LGSIL (low grade squamous intraepithelial dysplasia)     Myocardiopathy 07/21/2017    Prediabetes     Proteinuria     Renal disorder     Thyroid nodule     Urate nephropathy        Past Surgical History:  Past Surgical History:   Procedure Laterality Date    BREAST CYST EXCISION      COLONOSCOPY N/A 11/12/2015    COLONOSCOPY N/A 03/12/2021    Procedure: COLONOSCOPY;  Surgeon: Brendon Lanier MD;  Location: [facility];  Service: Endoscopy;   Laterality: N/A;  covid test 3/9, labs prior, prep instr mailed -ml    EXCISION OF ARTERIOVENOUS FISTULA Left 09/27/2023    Procedure: EXCISION, AV FISTULA;  Surgeon: FARIDEH Muñoz III, MD;  Location: [facility] OR 2ND FLR;  Service: Vascular;  Laterality: Left;  LUE AV graft excision    INSERTION OF BIVENTRICULAR IMPLANTABLE CARDIOVERTER-DEFIBRILLATOR (ICD)  04/2021    INSERTION OF TUNNELED CENTRAL VENOUS CATHETER (CVC) WITH SUBCUTANEOUS PORT N/A 01/25/2023    Procedure: INSERTION, DUAL LUMEN CATHETER WITH PORT, WITH IMAGING GUIDANCE;  Surgeon: FARIDEH Muñoz III, MD;  Location: [facility] OR 2ND FLR;  Service: Peripheral Vascular;  Laterality: N/A;  possinble permcath placment    NEPHRECTOMY-LAPAROSCOPIC Left 01/12/2016    PERCUTANEOUS TRANSLUMINAL ANGIOPLASTY OF ARTERIOVENOUS FISTULA Left 04/19/2023    Procedure: PTA, AV FISTULA;  Surgeon: FARIDEH Muñoz III, MD;  Location: [facility] CATH LAB;  Service: Peripheral Vascular;  Laterality: Left;    PERITONEAL CATHETER INSERTION      Permacath insertion  01/12/2017    PLACEMENT OF ARTERIOVENOUS GRAFT  12/28/2022    Procedure: INSERTION, GRAFT, ARTERIOVENOUS;  Surgeon: FARIDEH Muñoz III, MD;  Location: [facility] OR [facility] FLR;  Service: Peripheral Vascular;;    REMOVAL, GRAFT, ARTERIOVENOUS, UPPER EXTREMITY Left 01/25/2023    Procedure: REMOVAL, GRAFT, ARTERIOVENOUS, UPPER EXTREMITY;  Surgeon: FARIDEH Muñoz III, MD;  Location: [facility] OR 2ND FLR;  Service: Peripheral Vascular;  Laterality: Left;    REVISION OF ARTERIOVENOUS FISTULA Left 05/01/2023    Procedure: REVISION, AV FISTULA;  Surgeon: FARIDEH Muñoz III, MD;  Location: [facility] OR [facility] FLR;  Service: Peripheral Vascular;  Laterality: Left;  LUE AVG revision    REVISION OF PROCEDURE INVOLVING ARTERIOVENOUS GRAFT Left 12/28/2022    Procedure: REVISION, PROCEDURE INVOLVING ARTERIOVENOUS GRAFT;  Surgeon: FARIDEH Muñoz III, MD;  [facility];  Service: Peripheral Vascular;  Laterality: Left;    REVISION OF PROCEDURE  INVOLVING ARTERIOVENOUS GRAFT Left 07/21/2023    Procedure: LEFT ARM ARTERIOVENOUS GRAFT EXCISION  AND LIGATION;  Surgeon: Obi Werner MD;  Location: [facility] OR;  Service: Vascular;  Laterality: Left;  Left AVG excision and revision with interposition    SALPINGOOPHORECTOMY Right 2016    KJB---DAVINCI    TONSILLECTOMY      TUBAL LIGATION          Sedation History:    No known adverse reactions.     Social History:  Social History     Tobacco Use    Smoking status: Never     Passive exposure: Never    Smokeless tobacco: Never   Substance Use Topics    Alcohol use: No    Drug use: Yes     Types: Hydrocodone        Home Medications:   Prior to Admission medications    Medication Sig Start Date End Date Taking? Authorizing Provider   acetaminophen (TYLENOL) 500 MG tablet Take 500 mg by mouth every 6 (six) hours as needed for Pain.    Provider, Historical   acetaminophen (TYLENOL) 500 MG tablet Take 1 tablet (500 mg total) by mouth every 4 (four) hours as needed for Pain or Temperature greater than (100.5 or greater). 1/16/24   Ricco Erickson PA-C   albuterol-ipratropium (DUO-NEB) 2.5 mg-0.5 mg/3 mL nebulizer solution Take by nebulization. 10/18/23   Provider, Historical   apixaban (ELIQUIS) 2.5 mg Tab Take 1 tablet (2.5 mg total) by mouth 2 (two) times daily. 9/19/23   Smita Hurtado MD   aspirin (ECOTRIN) 81 MG EC tablet Take 1 tablet (81 mg total) by mouth once daily. 1/5/24 1/4/25  José Miguel Cristina MD   atorvastatin (LIPITOR) 40 MG tablet Take 1 tablet (40 mg total) by mouth once daily. 4/10/24   Shayne Walker MD   bisacodyL (DULCOLAX) 10 mg Supp Place 10 mg rectally. 10/18/23   Provider, Historical   cabozantinib (CABOMETYX) 60 mg Tab TAKE ONE TABLET BY MOUTH ONCE DAILY AT THE SAME TIME ON AN EMPTY STOMACH AT LEAST 1 HOUR BEFORE OR 2 HOURS AFTER EATING. AVOID GRAPEFRUIT PRODUCTS 9/11/23   Liset Ngo NP   cyclobenzaprine (FLEXERIL) 5 MG tablet TAKE 1 TABLET BY MOUTH DAILY AS  NEEDED FOR MUSCLE SPASMS. 9/13/23   Liset Ngo, NP   epoetin david-epbx (RETACRIT INJ) Epoetin david - epbx (Retacrit) 2/3/24 2/1/25  Provider, Historical   hyoscyamine (LEVSIN/SL) 0.125 mg Subl Place under the tongue. 10/18/23   Provider, Historical   lanthanum (FOSRENOL) 1000 MG chewable tablet Take 1 tablet by mouth. 8/25/23   Provider, Historical   levothyroxine (SYNTHROID) 75 MCG tablet TAKE 1 TABLET BY MOUTH EVERY DAY 2/1/24   Liset Ngo, NP   lidocaine-prilocaine (EMLA) cream APPLY ATLEAST 30 MINUTES BEFORE TREATMENT 3 TIMES A WEEK 2/11/20   Charo Sharif, BARBP   LORAZEPAM INTENSOL 2 mg/mL Conc Take by mouth. 10/18/23   Provider, Historical   metoprolol succinate (TOPROL-XL) 25 MG 24 hr tablet Take 1 tablet (25 mg total) by mouth once daily. 1/11/24 1/10/25  Alize Ross DO   mirtazapine (REMERON) 7.5 MG Tab Take 1 tablet (7.5 mg total) by mouth every evening. 1/11/24 1/10/25  Alize Ross DO   mv,Ca,min-folic acid-vit K1 (ONE-A-DAY WOMEN'S 50 PLUS) 400-20 mcg Tab Take 1 tablet by mouth once daily. 3/10/22   Provider, Historical   naloxone (NARCAN) 1 mg/mL injection 2 mg (1 mg per nostril) by Nasal route as needed for opioid overdose; may repeat in 3 to 5 minutes if not effective. Call 911 2/14/23   Rasta Garsia Jr., MD   nystatin (MYCOSTATIN) cream SMARTSIG:sparingly Topical 2-4 Times Daily PRN 10/23/23   Provider, Historical   ondansetron (ZOFRAN-ODT) 4 MG TbDL Take by mouth. 10/18/23   Provider, Historical   oxyCODONE (ROXICODONE) 5 MG immediate release tablet Take 1 tablet (5 mg total) by mouth every 6 (six) hours as needed for Pain. 1/16/24   Ricco Erickson PA-C   pantoprazole (PROTONIX) 40 MG tablet Take 1 tablet (40 mg total) by mouth 2 (two) times daily. 11/2/23 11/1/24  Gustavo Cartagena III, MD   traMADoL (ULTRAM) 50 mg tablet Take 1 tablet (50 mg total) by mouth every 12 (twelve) hours as needed for Pain. 11/8/23   Ben Rivera MD   amLODIPine (NORVASC) 5 MG  tablet TAKE 1 TABLET BY MOUTH EVERY DAY 10/14/21 6/29/22  Williams Hart MD       Inpatient Medications:    Current Facility-Administered Medications:     acetaminophen tablet 650 mg, 650 mg, Oral, Q6H PRN, José Miguel Cristina MD    aspirin EC tablet 81 mg, 81 mg, Oral, Daily, José Miguel Cristina MD, 81 mg at 04/14/24 0843    atorvastatin tablet 40 mg, 40 mg, Oral, Daily, José Miguel Cristina MD, 40 mg at 04/14/24 0843    ceFEPIme (MAXIPIME) 1 g in dextrose 5 % in water (D5W) 100 mL IVPB (MB+), 1 g, Intravenous, Q24H, Albina Bermudez MD, Last Rate: 200 mL/hr at 04/15/24 0907, 1 g at 04/15/24 0907    famotidine tablet 20 mg, 20 mg, Oral, Daily, José Miguel Cristina MD, 20 mg at 04/14/24 0843    levothyroxine tablet 75 mcg, 75 mcg, Oral, Before breakfast, José Miguel Cristina MD, 75 mcg at 04/15/24 0611    metoprolol succinate (TOPROL-XL) 24 hr tablet 25 mg, 25 mg, Oral, Daily, José Miguel Cristina MD    midodrine tablet 10 mg, 10 mg, Oral, TID, José Miguel Cristina MD, 10 mg at 04/14/24 2056    mupirocin 2 % ointment, , Nasal, BID, José Miguel Cristina MD, Given at 04/14/24 2056    oxyCODONE immediate release tablet 5 mg, 5 mg, Oral, Q6H PRN, José Miguel Cristina MD    Pharmacy to dose Vancomycin consult, , , Once **AND** vancomycin - pharmacy to dose, , Intravenous, pharmacy to manage frequency, Artis Boyd MD    Facility-Administered Medications Ordered in Other Encounters:     0.9%  NaCl infusion, , Intravenous, Continuous, Soni Bhatti MD, New Bag at 07/21/23 1116     Anticoagulants/Antiplatelets:   Apixaban and Aspirin    Allergies:   Review of patient's allergies indicates:   Allergen Reactions    Carvedilol Other (See Comments)     Nausea/vomiting    Allopurinol      Other reaction(s): abnormal transaminases       Review of Systems:   As documented in primary provider H&P.    Vital Signs:  Temp: 97.7 °F (36.5 °C) (04/15/24 0734)  Pulse: 83 (04/15/24 0734)  Resp: 18  (04/15/24 0734)  BP: 122/74 (04/15/24 0734)  SpO2: 96 % (04/15/24 0734)    Temp:  [96.9 °F (36.1 °C)-97.7 °F (36.5 °C)]   Pulse:  [79-89]   Resp:  [18-19]   BP: (112-126)/(66-88)   SpO2:  [96 %-99 %]      Physical Exam:  No acute distress, lethargic. Protecting airway  Regular rate   Breathing unlabored. Satting 98% on room air. Placed on NC per nursing staff  Abdomen nondistended  Nonverbal at this time. Localizes to stimuli.     Sedation Exam:  ASA: III - Patient appears to have severe systemic disease not posing a constant threat to life  Mallampati score: II (hard and soft palate, upper portion of tonsils anduvula visible)    Laboratory:  Lab Results   Component Value Date    INR 1.1 04/13/2024       Lab Results   Component Value Date    WBC 7.99 04/15/2024    HGB 11.7 (L) 04/15/2024    HCT 40.5 04/15/2024    MCV 97 04/15/2024     04/15/2024      Lab Results   Component Value Date    GLU 34 (LL) 04/15/2024     04/15/2024    K 5.1 04/15/2024     04/15/2024    CO2 11 (L) 04/15/2024    BUN 56 (H) 04/15/2024    CREATININE 6.4 (H) 04/15/2024    CALCIUM 10.8 (H) 04/15/2024    MG 2.4 04/13/2024    ALT 22 04/13/2024    AST 44 (H) 04/13/2024    ALBUMIN 3.5 04/13/2024    BILITOT 1.2 (H) 04/13/2024    BILIDIR 0.3 09/23/2015       Imaging:   Cxry 4/15 Reviewed.      ASSESSMENT/PLAN/RECOMMENDATIONS:    58 y.o. female with a fib (on eliquis), HFrEF (LVEF 23%), ESRD (on HD), HTN, and previous kidney cancer admitted for sepsis.     Interventional Radiology consulted for diagnostic left side thoracentesis.     At the time of evaluation, patient is newly lethargic. Vitals & glucose WNL per nursing staff. Patient protecting airway, responds to stimuli (attempting to remove NC being placed). Nursing has notified HM (head CT pending) and Rapid Response was called.     Pt unable to consent at this time, however will place on IR schedule for later this afternoon in hopes that her mental status has improved.     Will  evaluate later this afternoon for diagnostic left side thoracentesis.   All fluid studies to be ordered by referring provider.     Thank you for this consult. Please contact via Epic secure chat with questions.      Kaia West NP  Interventional Radiology

## 2024-04-15 NOTE — PROGRESS NOTES
[facility] Medicine  Progress Note    Patient Name: Eva Bloom  MRN: [id number]  Patient Class: IP- Inpatient   Admission Date: 4/13/2024  Length of Stay: 2 days  Attending Physician: José Miguel Cristina, *  Primary Care Provider: Sowmya Mccain MD        Subjective:     Principal Problem:Sepsis        HPI:  This is a 58 year old female, with a Anemia, Atrial fibrillation, CHF NYHA class II, chronic, systolic, CVA, ESRD on dialysis (Tues, Thurs, Sat) with completion of session today, Essential HTN, Hx of kidney cancer, Hyperparathyroidism, Myocardiopathy, and Prediabetes, who presents to the ED complaining of Palpitations, symptoms onset PTA. Patient reports cough, sleep disturbance, and generalized pruritus. Patient states she was at dialysis PTA and receiving dialysis when her heart rate jumped into the 120's and the dialysis nurses instructed her to present to the ER. Patient also states she has been on dialysis for 8 years and that she does not make urine. Patient further states she is on 2.5 mg of Eliquis. Additional history obtained from independent historian: patient's  states that in July last year she had a spot on her left foot and had a line inserted. Patient denies Hx of irregular heart rate or MI. Patient also denies rhinorrhea, sore throat, abdominal pain, emesis, diarrhea, or other associated symptoms. No other alleviating or exacerbating factors. This is the extent of the patient's complaints in the ED.patient is febrile in .8,tachycardic 120.sepsis likely  duo to l;ine infection,patient has been started on broad  spectrum IV Abx,blood culture is pending.    Overview/Hospital Course:  This is a 58 year old female, with a Anemia, Atrial fibrillation, CHF NYHA class II, chronic, systolic, CVA, ESRD on dialysis (Tues, Thurs, Sat) with completion of session today, Essential HTN, Hx of kidney cancer, Hyperparathyroidism, Myocardiopathy, and Prediabetes, who  presents to the ED complaining of Palpitations, symptoms onset PTA. Patient reports cough, sleep disturbance, and generalized pruritus. Patient states she was at dialysis PTA and receiving dialysis when her heart rate jumped into the 120's and the dialysis nurses instructed her to present to the ER. .patient is febrile in .8,tachycardic 120.sepsis likely  duo to l;ine infection,patient has been started on broad  spectrum IV Abx,blood culture is pending.HR and BP is improved at this time.cultures sofar negative.  Patient is lethargic today,code stroke was called,transfered to ICU,head CT show no acute process,evaluated by Tele neuro,will have CTA head,check ammonia and TSH,hypoglycemia is resolved,will monitor closely.    Interval History: Patient is lethargic today,code stroke was called,transfered to ICU,head CT show no acute process,evaluated by Tele neuro,will have CTA head,check ammonia and TSH,hypoglycemia is resolved,will monitor closely.    Review of Systems   Constitutional:  Positive for activity change and appetite change.   HENT:  Negative for congestion and dental problem.    Respiratory:  Negative for apnea and chest tightness.    Gastrointestinal:  Negative for abdominal distention.   Neurological:  Positive for weakness.   Psychiatric/Behavioral:  Positive for confusion.      Objective:     Vital Signs (Most Recent):  Temp: 97.7 °F (36.5 °C) (04/15/24 0734)  Pulse: 81 (04/15/24 0957)  Resp: 18 (04/15/24 0734)  BP: 127/81 (04/15/24 0957)  SpO2: 100 % (04/15/24 0957) Vital Signs (24h Range):  Temp:  [96.9 °F (36.1 °C)-97.7 °F (36.5 °C)] 97.7 °F (36.5 °C)  Pulse:  [79-89] 81  Resp:  [18-19] 18  SpO2:  [96 %-100 %] 100 %  BP: (112-127)/(66-88) 127/81     Weight: 47.6 kg (104 lb 14.4 oz)  Body mass index is 18.01 kg/m².    Intake/Output Summary (Last 24 hours) at 4/15/2024 1125  Last data filed at 4/15/2024 0814  Gross per 24 hour   Intake 533.39 ml   Output --   Net 533.39 ml         Physical  "Exam  Cardiovascular:      Rate and Rhythm: Normal rate.   Pulmonary:      Effort: No respiratory distress.   Skin:     Coloration: Skin is not jaundiced.      Findings: No bruising.   Neurological:      Mental Status: She is alert.      Motor: Weakness present.             Significant Labs: All pertinent labs within the past 24 hours have been reviewed.  BMP:   Recent Labs   Lab 04/15/24  0454   GLU 34*      K 5.1      CO2 11*   BUN 56*   CREATININE 6.4*   CALCIUM 10.8*     CBC:   Recent Labs   Lab 04/14/24  0441 04/15/24  0454   WBC 7.97 7.99   HGB 11.1* 11.7*   HCT 37.2 40.5   * 189     CMP:   Recent Labs   Lab 04/14/24  0441 04/15/24  0454    141   K 4.9 5.1    104   CO2 14* 11*   GLU 54* 34*   BUN 38* 56*   CREATININE 4.9* 6.4*   CALCIUM 10.3 10.8*   ANIONGAP 23* 26*       Significant Imaging: I have reviewed all pertinent imaging results/findings within the past 24 hours.    Assessment/Plan:      * Sepsis  This patient does have evidence of infective focus  My overall impression is sepsis.  Source:  line infection   Antibiotics given-   Antibiotics (72h ago, onward)      Start     Stop Route Frequency Ordered    04/13/24 2100  mupirocin 2 % ointment         04/18/24 2059 Nasl 2 times daily 04/13/24 1319    04/13/24 2000  ceFEPIme (MAXIPIME) 1 g in dextrose 5 % in water (D5W) 100 mL IVPB (MB+)         -- IV Every 12 hours (non-standard times) 04/13/24 1330    04/13/24 1217  vancomycin - pharmacy to dose  (vancomycin IVPB (PEDS and ADULTS))        Placed in "And" Linked Group    -- IV pharmacy to manage frequency 04/13/24 1117          Latest lactate reviewed-  Recent Labs   Lab 04/13/24  1107 04/13/24  1423   LACTATE  --  2.8*   POCLAC 3.67*  --        Organ dysfunction indicated by Acute kidney injury    Fluid challenge Ideal Body Weight- The patient's ideal body weight is Ideal body weight: 54.7 kg (120 lb 9.5 oz) which will be used to calculate fluid bolus of 30 ml/kg for " treatment of septic shock.      Post- resuscitation assessment Yes Perfusion exam was performed within 6 hours of septic shock presentation after bolus shows Adequate tissue perfusion assessed by non-invasive monitoring       Will Not start Pressors- Levophed for MAP of 65  Source control achieved by: IV Abx.  HR and BP is improved at this time.cultures sofar negative.    Acute metabolic encephalopathy    Patient is lethargic today,code stroke was called,transfered to ICU,head CT show no acute process,evaluated by Tele neuro,will have CTA head,check ammonia and TSH,hypoglycemia is resolved,will monitor closely.    Severe protein-calorie malnutrition  Nutrition consulted. Most recent weight and BMI monitored-     Measurements:  Wt Readings from Last 1 Encounters:   04/13/24 46.3 kg (102 lb)   Body mass index is 17.51 kg/m².    Patient has been screened and assessed by RD.    Malnutrition Type:  Context:    Level:      Malnutrition Characteristic Summary:       Interventions/Recommendations (treatment strategy):         Hypothyroidism  On synthroid.      PAF (paroxysmal atrial fibrillation)   On OAC and BB.    ICD (implantable cardioverter-defibrillator) in place - SubQ  Will monitor.      Chronic combined systolic and diastolic congestive heart failure  Patient is identified as having Combined Systolic and Diastolic heart failure that is Chronic. CHF is currently controlled. Latest ECHO performed and demonstrates- Results for orders placed during the hospital encounter of 05/08/23    Echo Saline Bubble? No    Interpretation Summary  · The left ventricle is normal in size with mild eccentric hypertrophy and severely decreased systolic function.  · The estimated ejection fraction is 23%.  · There is severe left ventricular global hypokinesis.  · Grade II left ventricular diastolic dysfunction.  · Moderate left atrial enlargement.  · Normal right ventricular size with normal right ventricular systolic function.  ·  Moderate right atrial enlargement.  · There is mild aortic valve stenosis.  · Aortic valve area is 1.58 cm2; peak velocity is 2.01 m/s; mean gradient is 8 mmHg.  · Moderate mitral regurgitation.  · Moderate to severe tricuspid regurgitation.  · Mild pulmonic regurgitation.  · Elevated central venous pressure (15 mmHg).  · The estimated PA systolic pressure is 47 mmHg.  · There is mild-moderate pulmonary hypertension.  · Moderate posterolateral pericardial effusion. Trivial under the RA  . Continue Beta Blocker and monitor clinical status closely. Monitor on telemetry. Patient is off CHF pathway.  Monitor strict Is&Os and daily weights.  Place on fluid restriction of 1.5 L. Cardiology has not been consulted. Continue to stress to patient importance of self efficacy and  on diet for CHF. Last BNP reviewed- and noted below   Recent Labs   Lab 04/13/24  1104   BNP >4,900*       Pulmonary hypertension  Fluid removal with HD.      Essential hypertension  On hold duo to sepsis.      VTE Risk Mitigation (From admission, onward)      None            Discharge Planning   MAVIS:      Code Status: Full Code   Is the patient medically ready for discharge?:     Reason for patient still in hospital (select all that apply): Patient trending condition               Critical care time spent on the evaluation and treatment of severe organ dysfunction, review of pertinent labs and imaging studies, discussions with consulting providers and discussions with patient/family:  over 45  minutes.      José Miguel Cristina MD  Department of Hospital Medicine   [facility] [place]

## 2024-04-15 NOTE — NURSING
Pt still with AMS post glucose. Pt also with tachypnea, JVD, and abdominal resp. MD came to bedside to evaluate . MD placed ABG orders. Resp notified. Also placed CXR and radiology was notifed.

## 2024-04-15 NOTE — NURSING
RAPID RESPONSE NURSE NOTE        Admit Date: 2024  LOS: 2  Code Status: Full Code   Date of Consult: 04/15/2024  : 1965  Age: 58 y.o.  Weight:   Wt Readings from Last 1 Encounters:   24 47.6 kg (104 lb 14.4 oz)     Sex: female  Race: Black or    Bed: [place] [address] A:   MRN: [id number]  Time Rapid Response Team page Received: 0955  Time Rapid Response Team at Bedside: 0958  Time Rapid Response Team left Bedside: 1029  Was the patient discharged from an ICU this admission? No   Was the patient discharged from a PACU within last 24 hours? No   Did the patient receive conscious sedation/general anesthesia in last 24 hours? No   Was the patient in the ED within the past 24 hours? No   Was the patient on NIPPV within the past 24 hours? No   Did this progress into an ARC or CPA:  no  Attending Physician: José Miguel Cristina, *  Primary Service: Hospitalist     SITUATION     Notified by overhead page.  Reason for alert: increased AMS  Called to evaluate the patient for Neuro    Why is the patient in the hospital?: Sepsis    Patient has a past medical history of Anemia, Anticoagulant long-term use, Atrial fibrillation, Bronchitis, Cancer, CHF (congestive heart failure), NYHA class II, chronic, systolic, CMV (cytomegalovirus) antibody positive, CVA (cerebral vascular accident), Encounter for blood transfusion, ESRD (end stage renal disease), Essential hypertension, H/O herpes simplex type 2 infection, Herpes simplex type 1 antibody positive, History of kidney cancer, Hyperparathyroidism, unspecified, Hyperuricemia without signs of inflammatory arthritis and tophaceous disease, Kidney stones, LGSIL (low grade squamous intraepithelial dysplasia), Myocardiopathy, Prediabetes, Proteinuria, Renal disorder, Thyroid nodule, and Urate nephropathy.    Last Vitals:  Temp: 97.7 °F (36.5 °C) (04/15 0734)  Pulse: 81 (04/15 0957)  Resp: 18 (04/15 0734)  BP: 127/81 (04/15 0957)  SpO2: 100 % (04/15 0957)    24  Hours Vitals Range:  Temp:  [96.9 °F (36.1 °C)-97.7 °F (36.5 °C)]   Pulse:  [79-85]   Resp:  [18-19]   BP: (112-127)/(66-84)   SpO2:  [96 %-100 %]     Labs:  Recent Labs     04/13/24  1104 04/14/24  0441 04/15/24  0454   WBC 4.12 7.97 7.99   HGB 12.9 11.1* 11.7*   HCT 42.4 37.2 40.5    143* 189       Recent Labs     04/13/24  1104 04/14/24  0441 04/15/24  0454    141 141   K 3.9 4.9 5.1    104 104   CO2 19* 14* 11*   BUN 22* 38* 56*   CREATININE 3.7* 4.9* 6.4*   GLU 65* 54* 34*   PHOS 4.0  --   --    MG 2.4  --   --         Recent Labs     04/15/24  0946   PH 7.365   PCO2 28.8*   PO2 47*   HCO3 16.4*   POCSATURATED 82   BE -8*        ASSESSMENT/INTERVENTIONS    The patient was seen for a Neurological problem. Staff concerns included mental status change. The following interventions were performed: POCT glucose, NIH stroke assessment, and stroke code initiated.    RECOMMENDATIONS    We recommend:Code stroke activation    Discussed plan of care with charge RNJennifer    PROVIDER ESCALATION    Orders received and case discussed with Dr. Ramires .    Disposition: Tx in ICU bed 272    FOLLOW UP  Call the Rapid Response NurseShannan at x [phone] for additional questions or concerns.

## 2024-04-15 NOTE — PROGRESS NOTES
Barely awake    Opens her eyes briefly not following commands     HPI/ROS unable to obtain     CXR terrible cardiomegaly     VBG  47/ 28 /7.36     Lactic acidosis noted     Hypoglycemic this am     Past Medical History:   Diagnosis Date    Anemia     Anticoagulant long-term use     Atrial fibrillation     Bronchitis 03/01/2017    Cancer 2016    kidney cancer    CHF (congestive heart failure), NYHA class II, chronic, systolic     CMV (cytomegalovirus) antibody positive     CVA (cerebral vascular accident) 1/3/2024    Encounter for blood transfusion     ESRD (end stage renal disease)     Essential hypertension 09/23/2015    H/O herpes simplex type 2 infection     Herpes simplex type 1 antibody positive     History of kidney cancer     s/p left nephrectomy 1/2016    Hyperparathyroidism, unspecified     Hyperuricemia without signs of inflammatory arthritis and tophaceous disease     Kidney stones     LGSIL (low grade squamous intraepithelial dysplasia)     Myocardiopathy 07/21/2017    Prediabetes     Proteinuria     Renal disorder     Thyroid nodule     Urate nephropathy      Past Surgical History:   Procedure Laterality Date    BREAST CYST EXCISION      COLONOSCOPY N/A 11/12/2015    COLONOSCOPY N/A 03/12/2021    Procedure: COLONOSCOPY;  Surgeon: Brendon Lanier MD;  Location: [facility];  Service: Endoscopy;  Laterality: N/A;  covid test 3/9, labs prior, prep instr mailed -ml    EXCISION OF ARTERIOVENOUS FISTULA Left 09/27/2023    Procedure: EXCISION, AV FISTULA;  Surgeon: FARIDEH Muñoz III, MD;  Location: [facility] OR [address] [place] [address];  Service: Vascular;  Laterality: Left;  LUE AV graft excision    INSERTION OF BIVENTRICULAR IMPLANTABLE CARDIOVERTER-DEFIBRILLATOR (ICD)  04/2021    INSERTION OF TUNNELED CENTRAL VENOUS CATHETER (CVC) WITH SUBCUTANEOUS PORT N/A 01/25/2023    Procedure: INSERTION, DUAL LUMEN CATHETER WITH PORT, WITH IMAGING GUIDANCE;  Surgeon: FARIDEH Muñoz III, MD;  Location: [facility] OR [address] [place] [address];  Service:  Peripheral Vascular;  Laterality: N/A;  possinble permcath placment    NEPHRECTOMY-LAPAROSCOPIC Left 01/12/2016    PERCUTANEOUS TRANSLUMINAL ANGIOPLASTY OF ARTERIOVENOUS FISTULA Left 04/19/2023    Procedure: PTA, AV FISTULA;  Surgeon: FARIDEH Muñoz III, MD;  Location: [facility] CATH LAB;  Service: Peripheral Vascular;  Laterality: Left;    PERITONEAL CATHETER INSERTION      Permacath insertion  01/12/2017    PLACEMENT OF ARTERIOVENOUS GRAFT  12/28/2022    Procedure: INSERTION, GRAFT, ARTERIOVENOUS;  Surgeon: FARIDEH Muñoz III, MD;  Location: [facility] OR [facility] FLR;  Service: Peripheral Vascular;;    REMOVAL, GRAFT, ARTERIOVENOUS, UPPER EXTREMITY Left 01/25/2023    Procedure: REMOVAL, GRAFT, ARTERIOVENOUS, UPPER EXTREMITY;  Surgeon: FARIDEH Muñoz III, MD;  [facility];  Service: Peripheral Vascular;  Laterality: Left;    REVISION OF ARTERIOVENOUS FISTULA Left 05/01/2023    Procedure: REVISION, AV FISTULA;  Surgeon: FARIDEH Muñoz III, MD;  [facility];  Service: Peripheral Vascular;  Laterality: Left;  LUE AVG revision    REVISION OF PROCEDURE INVOLVING ARTERIOVENOUS GRAFT Left 12/28/2022    Procedure: REVISION, PROCEDURE INVOLVING ARTERIOVENOUS GRAFT;  Surgeon: FARIDEH Muñoz III, MD;  [facility];  Service: Peripheral Vascular;  Laterality: Left;    REVISION OF PROCEDURE INVOLVING ARTERIOVENOUS GRAFT Left 07/21/2023    Procedure: LEFT ARM ARTERIOVENOUS GRAFT EXCISION  AND LIGATION;  Surgeon: Obi Werner MD;  Location: [facility];  Service: Vascular;  Laterality: Left;  Left AVG excision and revision with interposition    SALPINGOOPHORECTOMY Right 2016    KJB---DAVINCI    TONSILLECTOMY      TUBAL LIGATION       Review of patient's allergies indicates:   Allergen Reactions    Carvedilol Other (See Comments)     Nausea/vomiting    Allopurinol      Other reaction(s): abnormal transaminases       Current Facility-Administered Medications   Medication Dose Route Frequency  Provider Last Rate Last Admin    acetaminophen tablet 650 mg  650 mg Oral Q6H PRN José Miguel Cristina MD        aspirin EC tablet 81 mg  81 mg Oral Daily José Miguel Cristina MD   81 mg at 04/14/24 0843    atorvastatin tablet 40 mg  40 mg Oral Daily José Miguel Cristina MD   40 mg at 04/14/24 0843    ceFEPIme (MAXIPIME) 1 g in dextrose 5 % in water (D5W) 100 mL IVPB (MB+)  1 g Intravenous Q24H Albina Bermudez MD   Stopped at 04/15/24 0937    famotidine tablet 20 mg  20 mg Oral Daily José Miguel Cristina MD   20 mg at 04/14/24 0843    levothyroxine tablet 75 mcg  75 mcg Oral Before breakfast José Miguel Cristina MD   75 mcg at 04/15/24 0611    metoprolol succinate (TOPROL-XL) 24 hr tablet 25 mg  25 mg Oral Daily José Miguel Cristina MD        midodrine tablet 10 mg  10 mg Oral TID José Miguel Cristina MD   10 mg at 04/14/24 2056    mupirocin 2 % ointment   Nasal BID José Miguel Cristina MD   Given at 04/14/24 2056    oxyCODONE immediate release tablet 5 mg  5 mg Oral Q6H PRN José Miguel Cristina MD        vancomycin - pharmacy to dose   Intravenous pharmacy to manage frequency Artis Boyd MD         Facility-Administered Medications Ordered in Other Encounters   Medication Dose Route Frequency Provider Last Rate Last Admin    0.9%  NaCl infusion   Intravenous Continuous Soni Bhatti MD   New Bag at 07/21/23 1116   ECho 3/23/      Show Result ComparisonThe left ventricle is normal in size with mild eccentric hypertrophy and severely decreased systolic function.  The estimated ejection fraction is 23%.  There is severe left ventricular global hypokinesis.  Grade II left ventricular diastolic dysfunction.  Moderate left atrial enlargement.  Normal right ventricular size with normal right ventricular systolic function.  Moderate right atrial enlargement.  There is mild aortic valve stenosis.  Aortic valve area is 1.58 cm2; peak velocity is 2.01 m/s; mean gradient is 8  mmHg.  Moderate mitral regurgitation.  Moderate to severe tricuspid regurgitation.  Mild pulmonic regurgitation.  Elevated central venous pressure (15 mmHg).  The estimated PA systolic pressure is 47 mmHg.  There is mild-moderate pulmonary hypertension.  Moderate posterolateral pericardial effusion. Trivial under the RA    LABS    Recent Results (from the past 24 hour(s))   Lactic acid, plasma    Collection Time: 04/14/24  3:27 PM   Result Value Ref Range    Lactate (Lactic Acid) 5.7 (HH) 0.5 - 2.2 mmol/L   Vancomycin, Random    Collection Time: 04/15/24  4:54 AM   Result Value Ref Range    Vancomycin, Random 26.1 Not established ug/mL   CBC Auto Differential    Collection Time: 04/15/24  4:54 AM   Result Value Ref Range    WBC 7.99 3.90 - 12.70 K/uL    RBC 4.16 4.00 - 5.40 M/uL    Hemoglobin 11.7 (L) 12.0 - 16.0 g/dL    Hematocrit 40.5 37.0 - 48.5 %    MCV 97 82 - 98 fL    MCH 28.1 27.0 - 31.0 pg    MCHC 28.9 (L) 32.0 - 36.0 g/dL    RDW 22.0 (H) 11.5 - 14.5 %    Platelets 189 150 - 450 K/uL    MPV 11.0 9.2 - 12.9 fL    Immature Granulocytes 0.9 (H) 0.0 - 0.5 %    Gran # (ANC) 4.8 1.8 - 7.7 K/uL    Immature Grans (Abs) 0.07 (H) 0.00 - 0.04 K/uL    Lymph # 1.6 1.0 - 4.8 K/uL    Mono # 1.1 (H) 0.3 - 1.0 K/uL    Eos # 0.2 0.0 - 0.5 K/uL    Baso # 0.12 0.00 - 0.20 K/uL    nRBC 1 (A) 0 /100 WBC    Gran % 60.5 38.0 - 73.0 %    Lymph % 20.0 18.0 - 48.0 %    Mono % 14.3 4.0 - 15.0 %    Eosinophil % 2.8 0.0 - 8.0 %    Basophil % 1.5 0.0 - 1.9 %    Differential Method Automated    Basic Metabolic Panel    Collection Time: 04/15/24  4:54 AM   Result Value Ref Range    Sodium 141 136 - 145 mmol/L    Potassium 5.1 3.5 - 5.1 mmol/L    Chloride 104 95 - 110 mmol/L    CO2 11 (L) 23 - 29 mmol/L    Glucose 34 (LL) 70 - 110 mg/dL    BUN 56 (H) 6 - 20 mg/dL    Creatinine 6.4 (H) 0.5 - 1.4 mg/dL    Calcium 10.8 (H) 8.7 - 10.5 mg/dL    Anion Gap 26 (H) 8 - 16 mmol/L    eGFR 7 (A) >60 mL/min/1.73 m^2   POCT glucose    Collection Time:  04/15/24  6:42 AM   Result Value Ref Range    POCT Glucose 33 (LL) 70 - 110 mg/dL   POCT glucose    Collection Time: 04/15/24  7:06 AM   Result Value Ref Range    POCT Glucose 34 (LL) 70 - 110 mg/dL   POCT glucose    Collection Time: 04/15/24  7:08 AM   Result Value Ref Range    POCT Glucose 44 (LL) 70 - 110 mg/dL   POCT glucose    Collection Time: 04/15/24  7:41 AM   Result Value Ref Range    POCT Glucose 83 70 - 110 mg/dL   ISTAT PROCEDURE    Collection Time: 04/15/24  9:46 AM   Result Value Ref Range    POC PH 7.365 7.35 - 7.45    POC PCO2 28.8 (LL) 35 - 45 mmHg    POC PO2 47 (LL) 80 - 100 mmHg    POC HCO3 16.4 (L) 24 - 28 mmol/L    POC BE -8 (L) -2 to 2 mmol/L    POC SATURATED O2 82 95 - 100 %    POC TCO2 17 (L) 23 - 27 mmol/L    Sample ARTERIAL     Site RR     Allens Test N/A     DelSys Room Air     Mode SPONT     FiO2 21    POCT glucose    Collection Time: 04/15/24 10:02 AM   Result Value Ref Range    POCT Glucose 170 (H) 70 - 110 mg/dL   ]    I/O last 3 completed shifts:  In: 643.4 [P.O.:350; IV Piggyback:293.4]  Out: 0     Vitals:    04/15/24 0540 04/15/24 0734 04/15/24 0953 04/15/24 0957   BP: 126/72 122/74 122/84 127/81   Pulse: 84 83 79 81   Resp: 18 18     Temp: 97.4 °F (36.3 °C) 97.7 °F (36.5 °C)     TempSrc: Oral Oral     SpO2: 97% 96% 100% 100%   Weight:       Height:           Pos Jvd, No Thyromegaly or Lymphadenopathy  Lungs: Fairly clear anteriorly and laterally  Cor: RRR no G or rubs  Abd: Soft benign good bowel sounds non tender  Ext: No E C C    A)    Difficult situation esrd pt with terrible card fx and AMS also with lactic acidosis and lowish bp with worsening vitals signs   ESRD x 8 y  Hypoglycemia ( in esrd patient might be an ominous sign)   Anemia   Hx of cva ? New CVA  Hx of prediabetes    P)    Renal Diet when able   Home meds when able  Protect access  HD might need hd low intensity today  EPO prn   Binders prn   Adjust all meds to the degree of renal fx  Close follow up I/O and  weights  Maintain Hydration   Neuro and cardiology input

## 2024-04-15 NOTE — NURSING
RAPID RESPONSE NURSE PROACTIVE ROUNDING NOTE       Time of Visit: 0910    Admit Date: 2024  LOS: 2  Code Status: Full Code   Date of Visit: 04/15/2024  : 1965  Age: 58 y.o.  Sex: female  Race: Black or   Bed: W272/W272 A:   MRN: [id number]  Was the patient discharged from an ICU this admission? No   Was the patient discharged from a PACU within last 24 hours? No   Did the patient receive conscious sedation/general anesthesia in last 24 hours? No   Was the patient in the ED within the past 24 hours? No   Was the patient on NIPPV within the past 24 hours? No   Attending Physician: José Miguel Cristina, *  Primary Service: Hospitalist   Time spent at the bedside: 15 -30 min    SITUATION    Notified by  bedside RN during rounding  Reason for alert: AMS; tachypnea, abd muscle use.      Diagnosis: Sepsis   has a past medical history of Anemia, Anticoagulant long-term use, Atrial fibrillation, Bronchitis, Cancer, CHF (congestive heart failure), NYHA class II, chronic, systolic, CMV (cytomegalovirus) antibody positive, CVA (cerebral vascular accident), Encounter for blood transfusion, ESRD (end stage renal disease), Essential hypertension, H/O herpes simplex type 2 infection, Herpes simplex type 1 antibody positive, History of kidney cancer, Hyperparathyroidism, unspecified, Hyperuricemia without signs of inflammatory arthritis and tophaceous disease, Kidney stones, LGSIL (low grade squamous intraepithelial dysplasia), Myocardiopathy, Prediabetes, Proteinuria, Renal disorder, Thyroid nodule, and Urate nephropathy.    Last Vitals:  Temp: 97.7 °F (36.5 °C) (04/15 07)  Pulse: 81 (04/15 0957)  Resp: 18 (04/15 07)  BP: 127/81 (04/15 0957)  SpO2: 100 % (04/15 09)    24 Hour Vitals Range:  Temp:  [96.9 °F (36.1 °C)-97.7 °F (36.5 °C)]   Pulse:  [79-85]   Resp:  [18-19]   BP: (112-127)/(66-84)   SpO2:  [96 %-100 %]     Clinical Issues: Neuro    ASSESSMENT/INTERVENTIONS    Per RN pt with AMS and  increased RR.     RECOMMENDATIONS  Chest x-ray.  Will send NP to further assess.     Discussed plan of care with bedside RNKelly    PROVIDER ESCALATION    Physician escalation: Yes    Orders received and case discussed with Dr. Ramires .    Disposition:Remain in room 407    FOLLOW UP    Call back the Rapid Response NurseShannan at [phone] for additional questions or concerns.

## 2024-04-15 NOTE — ASSESSMENT & PLAN NOTE
Patient is lethargic today,code stroke was called,transfered to ICU,head CT show no acute process,evaluated by Tele neuro,will have CTA head,check ammonia and TSH,hypoglycemia is resolved,will monitor closely.

## 2024-04-16 ENCOUNTER — TELEPHONE (OUTPATIENT)
Dept: HEMATOLOGY/ONCOLOGY | Facility: CLINIC | Age: 59
End: 2024-04-16
Payer: MEDICARE

## 2024-04-16 PROBLEM — L89.620 PRESSURE INJURY OF LEFT HEEL, UNSTAGEABLE: Status: ACTIVE | Noted: 2024-04-16

## 2024-04-16 LAB
AMMONIA PLAS-SCNC: 29 UMOL/L (ref 10–50)
ANION GAP SERPL CALC-SCNC: 14 MMOL/L (ref 8–16)
BASOPHILS # BLD AUTO: 0.09 K/UL (ref 0–0.2)
BASOPHILS NFR BLD: 1.6 % (ref 0–1.9)
BUN SERPL-MCNC: 26 MG/DL (ref 6–20)
CALCIUM SERPL-MCNC: 11.5 MG/DL (ref 8.7–10.5)
CHLORIDE SERPL-SCNC: 99 MMOL/L (ref 95–110)
CO2 SERPL-SCNC: 23 MMOL/L (ref 23–29)
CREAT SERPL-MCNC: 4.2 MG/DL (ref 0.5–1.4)
DIFFERENTIAL METHOD BLD: ABNORMAL
EOSINOPHIL # BLD AUTO: 0.2 K/UL (ref 0–0.5)
EOSINOPHIL NFR BLD: 3.3 % (ref 0–8)
ERYTHROCYTE [DISTWIDTH] IN BLOOD BY AUTOMATED COUNT: 21.5 % (ref 11.5–14.5)
EST. GFR  (NO RACE VARIABLE): 12 ML/MIN/1.73 M^2
GLUCOSE SERPL-MCNC: 71 MG/DL (ref 70–110)
HCT VFR BLD AUTO: 36.4 % (ref 37–48.5)
HGB BLD-MCNC: 11.2 G/DL (ref 12–16)
IMM GRANULOCYTES # BLD AUTO: 0.02 K/UL (ref 0–0.04)
IMM GRANULOCYTES NFR BLD AUTO: 0.4 % (ref 0–0.5)
LACTATE SERPL-SCNC: 2.1 MMOL/L (ref 0.5–2.2)
LYMPHOCYTES # BLD AUTO: 0.8 K/UL (ref 1–4.8)
LYMPHOCYTES NFR BLD: 14.2 % (ref 18–48)
MCH RBC QN AUTO: 28.7 PG (ref 27–31)
MCHC RBC AUTO-ENTMCNC: 30.8 G/DL (ref 32–36)
MCV RBC AUTO: 93 FL (ref 82–98)
MONOCYTES # BLD AUTO: 0.6 K/UL (ref 0.3–1)
MONOCYTES NFR BLD: 11.2 % (ref 4–15)
NEUTROPHILS # BLD AUTO: 3.9 K/UL (ref 1.8–7.7)
NEUTROPHILS NFR BLD: 69.3 % (ref 38–73)
NRBC BLD-RTO: 1 /100 WBC
PLATELET # BLD AUTO: 172 K/UL (ref 150–450)
PMV BLD AUTO: 10.8 FL (ref 9.2–12.9)
POCT GLUCOSE: 112 MG/DL (ref 70–110)
POCT GLUCOSE: 113 MG/DL (ref 70–110)
POCT GLUCOSE: 130 MG/DL (ref 70–110)
POCT GLUCOSE: 74 MG/DL (ref 70–110)
POCT GLUCOSE: 77 MG/DL (ref 70–110)
POCT GLUCOSE: 79 MG/DL (ref 70–110)
POCT GLUCOSE: 95 MG/DL (ref 70–110)
POTASSIUM SERPL-SCNC: 3.6 MMOL/L (ref 3.5–5.1)
RBC # BLD AUTO: 3.9 M/UL (ref 4–5.4)
SODIUM SERPL-SCNC: 136 MMOL/L (ref 136–145)
VANCOMYCIN SERPL-MCNC: 20.2 UG/ML
WBC # BLD AUTO: 5.69 K/UL (ref 3.9–12.7)

## 2024-04-16 PROCEDURE — S5010 5% DEXTROSE AND 0.45% SALINE: HCPCS | Performed by: HOSPITALIST

## 2024-04-16 PROCEDURE — 97166 OT EVAL MOD COMPLEX 45 MIN: CPT

## 2024-04-16 PROCEDURE — 25000003 PHARM REV CODE 250: Performed by: HOSPITALIST

## 2024-04-16 PROCEDURE — 97110 THERAPEUTIC EXERCISES: CPT

## 2024-04-16 PROCEDURE — 97162 PT EVAL MOD COMPLEX 30 MIN: CPT

## 2024-04-16 PROCEDURE — 97535 SELF CARE MNGMENT TRAINING: CPT

## 2024-04-16 PROCEDURE — 80202 ASSAY OF VANCOMYCIN: CPT | Performed by: HOSPITALIST

## 2024-04-16 PROCEDURE — 11000001 HC ACUTE MED/SURG PRIVATE ROOM

## 2024-04-16 PROCEDURE — 36415 COLL VENOUS BLD VENIPUNCTURE: CPT | Performed by: HOSPITALIST

## 2024-04-16 PROCEDURE — 63600175 PHARM REV CODE 636 W HCPCS: Performed by: HOSPITALIST

## 2024-04-16 PROCEDURE — 83605 ASSAY OF LACTIC ACID: CPT | Performed by: HOSPITALIST

## 2024-04-16 PROCEDURE — 80048 BASIC METABOLIC PNL TOTAL CA: CPT | Performed by: HOSPITALIST

## 2024-04-16 PROCEDURE — 95816 EEG AWAKE AND DROWSY: CPT

## 2024-04-16 PROCEDURE — 85025 COMPLETE CBC W/AUTO DIFF WBC: CPT | Performed by: HOSPITALIST

## 2024-04-16 PROCEDURE — 95816 EEG AWAKE AND DROWSY: CPT | Mod: 26,GV,HPC, | Performed by: PSYCHIATRY & NEUROLOGY

## 2024-04-16 PROCEDURE — 99223 1ST HOSP IP/OBS HIGH 75: CPT | Mod: GV,HPC,,

## 2024-04-16 PROCEDURE — 94761 N-INVAS EAR/PLS OXIMETRY MLT: CPT

## 2024-04-16 PROCEDURE — 82140 ASSAY OF AMMONIA: CPT | Performed by: HOSPITALIST

## 2024-04-16 PROCEDURE — 99223 1ST HOSP IP/OBS HIGH 75: CPT | Mod: GW,HPC,, | Performed by: STUDENT IN AN ORGANIZED HEALTH CARE EDUCATION/TRAINING PROGRAM

## 2024-04-16 RX ORDER — DEXTROSE MONOHYDRATE AND SODIUM CHLORIDE 5; .45 G/100ML; G/100ML
INJECTION, SOLUTION INTRAVENOUS CONTINUOUS
Status: DISCONTINUED | OUTPATIENT
Start: 2024-04-16 | End: 2024-04-21 | Stop reason: HOSPADM

## 2024-04-16 RX ORDER — METOPROLOL TARTRATE 25 MG/1
25 TABLET, FILM COATED ORAL 2 TIMES DAILY
Status: DISCONTINUED | OUTPATIENT
Start: 2024-04-16 | End: 2024-04-21 | Stop reason: HOSPADM

## 2024-04-16 RX ADMIN — METOPROLOL TARTRATE 25 MG: 25 TABLET, FILM COATED ORAL at 10:04

## 2024-04-16 RX ADMIN — ATORVASTATIN CALCIUM 40 MG: 40 TABLET, FILM COATED ORAL at 10:04

## 2024-04-16 RX ADMIN — ASPIRIN 81 MG: 81 TABLET, COATED ORAL at 10:04

## 2024-04-16 RX ADMIN — MUPIROCIN: 20 OINTMENT TOPICAL at 10:04

## 2024-04-16 RX ADMIN — DEXTROSE AND SODIUM CHLORIDE: 5; 450 INJECTION, SOLUTION INTRAVENOUS at 02:04

## 2024-04-16 RX ADMIN — FAMOTIDINE 20 MG: 20 TABLET ORAL at 10:04

## 2024-04-16 RX ADMIN — CEFEPIME 1 G: 1 INJECTION, POWDER, FOR SOLUTION INTRAMUSCULAR; INTRAVENOUS at 08:04

## 2024-04-16 NOTE — PLAN OF CARE
Patient here for therapeutic phlebotomy treatment:    Vital signs checked before and after treatment: Yes  Patient ate food and drank approximately 32 oz of fluid before treatment: Yes  Site cleansed with ChloraPrep for 30 seconds: Yes    - Peripheral IV: Right antecubital with Peripheral IV:  20 on the first attempt.  - Insertion site is clear; no redness, swelling, drainage or tenderness  Peripheral IV/Central Line Deaccess Site Appearance: clear  Volume of blood removed in mL: 500 mL    Comments: Per. Dr. Eulogio Frey treat with phlebotomy for Hgb above 12.   Discharged: Pt stable, tolerated treatment well, Pt given 8oz fluid to drink during and/or after procedure, Pt instructed to increase oral fluid intake today, Pt instructed to eat solid food within 2 hours after procedure and F/U Appt Scheduled    Future Appointments   Date Time Provider Department Center   1/5/2021  8:15 AM [email or web address]   1/5/2021  8:30 AM [facility] INFUSION RN [facility]   1/12/2021  8:30 AM [facility]   1/12/2021  8:45 AM [facility]FUSION RN [facility]   1/19/2021  8:15 AM [facility]   1/19/2021  8:30 AM [facility] INFUSION RN [facility]   1/19/2021  2:15 PM Kostas Vu MD [facility]   1/26/2021  8:15 AM [facility]   1/26/2021  8:30 AM [facility] INFUSION RN [facility]   2/2/2021  8:15 AM [facility]   2/2/2021  8:30 AM [facility] INFUSION RN [facility]   2/4/2021  8:15 AM Eulogio Frey MD [id number]   6/23/2021  8:30 AM MD LUIGI Choi LUIGI Aguilar NP is supervising clinician today.           Some improvement of mental status however remains confused, can initiate puree with thin liquids but it is unclear if she will meet nutritional needs in her current state. Kimmy Gaming, CCC-SLP

## 2024-04-16 NOTE — SUBJECTIVE & OBJECTIVE
Past Medical History:   Diagnosis Date    Anemia     Anticoagulant long-term use     Atrial fibrillation     Bronchitis 03/01/2017    Cancer 2016    kidney cancer    CHF (congestive heart failure), NYHA class II, chronic, systolic     CMV (cytomegalovirus) antibody positive     CVA (cerebral vascular accident) 1/3/2024    Encounter for blood transfusion     ESRD (end stage renal disease)     Essential hypertension 09/23/2015    H/O herpes simplex type 2 infection     Herpes simplex type 1 antibody positive     History of kidney cancer     s/p left nephrectomy 1/2016    Hyperparathyroidism, unspecified     Hyperuricemia without signs of inflammatory arthritis and tophaceous disease     Kidney stones     LGSIL (low grade squamous intraepithelial dysplasia)     Myocardiopathy 07/21/2017    Prediabetes     Proteinuria     Renal disorder     Thyroid nodule     Urate nephropathy        Past Surgical History:   Procedure Laterality Date    BREAST CYST EXCISION      COLONOSCOPY N/A 11/12/2015    COLONOSCOPY N/A 03/12/2021    Procedure: COLONOSCOPY;  Surgeon: Brendon Lanier MD;  Location: [facility];  Service: Endoscopy;  Laterality: N/A;  covid test 3/9, labs prior, prep instr mailed -ml    EXCISION OF ARTERIOVENOUS FISTULA Left 09/27/2023    Procedure: EXCISION, AV FISTULA;  Surgeon: FARIDEH Muñoz III, MD;  [facility];  Service: Vascular;  Laterality: Left;  LUE AV graft excision    INSERTION OF BIVENTRICULAR IMPLANTABLE CARDIOVERTER-DEFIBRILLATOR (ICD)  04/2021    INSERTION OF TUNNELED CENTRAL VENOUS CATHETER (CVC) WITH SUBCUTANEOUS PORT N/A 01/25/2023    Procedure: INSERTION, DUAL LUMEN CATHETER WITH PORT, WITH IMAGING GUIDANCE;  Surgeon: FARIDEH Muñoz III, MD;  [facility];  Service: Peripheral Vascular;  Laterality: N/A;  possinble permcath placment    NEPHRECTOMY-LAPAROSCOPIC Left 01/12/2016    PERCUTANEOUS TRANSLUMINAL ANGIOPLASTY OF ARTERIOVENOUS FISTULA Left 04/19/2023     Procedure: PTA, AV FISTULA;  Surgeon: FARIDEH Muñoz III, MD;  Location: [facility] CATH LAB;  Service: Peripheral Vascular;  Laterality: Left;    PERITONEAL CATHETER INSERTION      Permacath insertion  01/12/2017    PLACEMENT OF ARTERIOVENOUS GRAFT  12/28/2022    Procedure: INSERTION, GRAFT, ARTERIOVENOUS;  Surgeon: FARIDEH Muñoz III, MD;  Location: [facility] OR [facility];  Service: Peripheral Vascular;;    REMOVAL, GRAFT, ARTERIOVENOUS, UPPER EXTREMITY Left 01/25/2023    Procedure: REMOVAL, GRAFT, ARTERIOVENOUS, UPPER EXTREMITY;  Surgeon: FARIDEH Muñoz III, MD;  [facility];  Service: Peripheral Vascular;  Laterality: Left;    REVISION OF ARTERIOVENOUS FISTULA Left 05/01/2023    Procedure: REVISION, AV FISTULA;  Surgeon: FARIDEH Muñoz III, MD;  [facility];  Service: Peripheral Vascular;  Laterality: Left;  LUE AVG revision    REVISION OF PROCEDURE INVOLVING ARTERIOVENOUS GRAFT Left 12/28/2022    Procedure: REVISION, PROCEDURE INVOLVING ARTERIOVENOUS GRAFT;  Surgeon: FARIDEH Muñoz III, MD;  [facility];  Service: Peripheral Vascular;  Laterality: Left;    REVISION OF PROCEDURE INVOLVING ARTERIOVENOUS GRAFT Left 07/21/2023    Procedure: LEFT ARM ARTERIOVENOUS GRAFT EXCISION  AND LIGATION;  Surgeon: Obi Werner MD;  [facility];  Service: Vascular;  Laterality: Left;  Left AVG excision and revision with interposition    SALPINGOOPHORECTOMY Right 2016    KJB---DAVINCI    TONSILLECTOMY      TUBAL LIGATION         Review of patient's allergies indicates:   Allergen Reactions    Carvedilol Other (See Comments)     Nausea/vomiting    Allopurinol      Other reaction(s): abnormal transaminases       Current Neurological Medications:     Current Facility-Administered Medications   Medication Dose Route Frequency Provider Last Rate Last Admin    acetaminophen tablet 650 mg  650 mg Oral Q6H PRN José Miguel Cristina MD        aspirin EC tablet 81 mg  81 mg Oral  Daily José Miguel Cristina MD   81 mg at 04/16/24 1006    atorvastatin tablet 40 mg  40 mg Oral Daily José Miguel Cristina MD   40 mg at 04/16/24 1006    ceFEPIme (MAXIPIME) 1 g in dextrose 5 % in water (D5W) 100 mL IVPB (MB+)  1 g Intravenous Q24H José Miguel Cristina MD   Stopped at 04/16/24 0923    dextrose 10% bolus 125 mL 125 mL  12.5 g Intravenous PRN José Miguel Cristina MD        dextrose 10% bolus 250 mL 250 mL  25 g Intravenous PRN José Miguel Cristina MD        dextrose 5 % and 0.45 % NaCl infusion   Intravenous Continuous José Miguel Cristina MD        famotidine tablet 20 mg  20 mg Oral Daily José Miguel Cristina MD   20 mg at 04/16/24 1006    glucagon (human recombinant) injection 1 mg  1 mg Intramuscular PRN José Miguel Cristina MD        glucose chewable tablet 16 g  16 g Oral PRN José Miguel Cristina MD        glucose chewable tablet 24 g  24 g Oral PRN José Miguel Cristina MD        levothyroxine tablet 75 mcg  75 mcg Oral Before breakfast José Miguel Cristina MD   75 mcg at 04/15/24 0611    metoprolol tartrate (LOPRESSOR) tablet 25 mg  25 mg Oral BID José Miguel Cristina MD   25 mg at 04/16/24 1044    mupirocin 2 % ointment   Nasal BID José Miguel Cristina MD   Given at 04/16/24 1015    vancomycin - pharmacy to dose   Intravenous pharmacy to manage frequency José Miguel Cristina MD         Facility-Administered Medications Ordered in Other Encounters   Medication Dose Route Frequency Provider Last Rate Last Admin    0.9%  NaCl infusion   Intravenous Continuous Soni Bhatti MD   New Bag at 07/21/23 1116     Family History       Problem Relation (Age of Onset)    Cataracts Maternal Aunt    Diabetes Father, Maternal Grandfather    Heart disease Sister    Hypertension Mother    Kidney disease Father    No Known Problems Sister, Sister, Brother, Brother, Maternal Uncle, Paternal Aunt, Paternal Uncle, Maternal Grandmother, Paternal Grandmother, Paternal  "Grandfather, Son, Son, Other          Tobacco Use    Smoking status: Never     Passive exposure: Never    Smokeless tobacco: Never   Substance and Sexual Activity    Alcohol use: No    Drug use: Yes     Types: Hydrocodone    Sexual activity: Not Currently     Review of Systems   Unable to perform ROS: Acuity of condition   Neurological:  Negative for seizures, facial asymmetry and weakness.   Psychiatric/Behavioral:  Positive for confusion. Negative for agitation.      Objective:     Vital Signs (Most Recent):  Temp: 97 °F (36.1 °C) (04/16/24 1100)  Pulse: 88 (04/16/24 1200)  Resp: (!) 33 (04/16/24 1200)  BP: 118/76 (04/16/24 1200)  SpO2: 96 % (04/16/24 1200) Vital Signs (24h Range):  Temp:  [97 °F (36.1 °C)-97.7 °F (36.5 °C)] 97 °F (36.1 °C)  Pulse:  [77-96] 88  Resp:  [13-37] 33  SpO2:  [84 %-99 %] 96 %  BP: (103-148)/(64-96) 118/76     Weight: 47.6 kg (104 lb 14.4 oz)  Body mass index is 18.01 kg/m².     Physical Exam  HENT:      Head: Normocephalic and atraumatic.   Eyes:      Extraocular Movements: Extraocular movements intact and EOM normal.   Pulmonary:      Effort: No respiratory distress.   Neurological:      Mental Status: She is alert.          NEUROLOGICAL EXAMINATION:     MENTAL STATUS   Level of consciousness: alert       Not following commands   Non verbal majorly / less participatory      CRANIAL NERVES     CN III, IV, VI   Extraocular motions are normal.   Nystagmus: none   Ophthalmoparesis: none    CN VII   Facial expression full, symmetric.     MOTOR EXAM        No new focal motor deficits       SENSORY EXAM        No new focal sensory deficits       GAIT AND COORDINATION     Tremor   Resting tremor: absent      Significant Labs: Hemoglobin A1c: No results for input(s): "HGBA1C" in the last 720 hours.  Blood Culture: No results for input(s): "LABBLOO" in the last 48 hours.  BMP:   Recent Labs   Lab 04/15/24  0454 04/16/24  0401   GLU 34* 71    136   K 5.1 3.6    99   CO2 11* 23   BUN " "56* 26*   CREATININE 6.4* 4.2*   CALCIUM 10.8* 11.5*     CBC:   Recent Labs   Lab 04/15/24  0454 04/16/24  0401   WBC 7.99 5.69   HGB 11.7* 11.2*   HCT 40.5 36.4*    172     CMP:   Recent Labs   Lab 04/15/24  0454 04/16/24  0401   GLU 34* 71    136   K 5.1 3.6    99   CO2 11* 23   BUN 56* 26*   CREATININE 6.4* 4.2*   CALCIUM 10.8* 11.5*   ANIONGAP 26* 14     CSF Culture: No results for input(s): "CSFCULTURE" in the last 48 hours.  CSF Studies: No results for input(s): "ALIQUT", "APPEARCSF", "COLORCSF", "CSFWBC", "CSFRBC", "GLUCCSF", "LDHCSF", "PROTEINCSF", "VDRLCSF" in the last 48 hours.  Inflammatory Markers: No results for input(s): "SEDRATE", "CRP", "PROCAL" in the last 48 hours.  POCT Glucose:   Recent Labs   Lab 04/16/24  0309 04/16/24  0820 04/16/24  1145   POCTGLUCOSE 74 79 112*     Prealbumin: No results for input(s): "PREALBUMIN" in the last 48 hours.  Respiratory Culture: No results for input(s): "GSRESP", "RESPIRATORYC" in the last 48 hours.  Urine Culture: No results for input(s): "LABURIN" in the last 48 hours.  Urine Studies: No results for input(s): "COLORU", "APPEARANCEUA", "PHUR", "SPECGRAV", "PROTEINUA", "GLUCUA", "KETONESU", "BILIRUBINUA", "OCCULTUA", "NITRITE", "UROBILINOGEN", "LEUKOCYTESUR", "RBCUA", "WBCUA", "BACTERIA", "SQUAMEPITHEL", "HYALINECASTS" in the last 48 hours.    Invalid input(s): "WRIGHTSUR"  Recent Lab Results  (Last 5 results in the past 24 hours)        04/16/24  1145   04/16/24  0950   04/16/24  0828   04/16/24  0820   04/16/24  0401        Ammonia     29           Anion Gap         14       Baso #         0.09       Basophil %         1.6       BUN         26       Calcium         11.5       Chloride         99       CO2         23       Creatinine         4.2       Differential Method         Automated       eGFR         12       Eos #         0.2       Eos %         3.3       Glucose         71       Gran # (ANC)         3.9       Gran %         69.3       " Hematocrit         36.4       Hemoglobin         11.2       Immature Grans (Abs)         0.02  Comment: Mild elevation in immature granulocytes is non specific and   can be seen in a variety of conditions including stress response,   acute inflammation, trauma and pregnancy. Correlation with other   laboratory and clinical findings is essential.         Immature Granulocytes         0.4       Lactic Acid Level   2.1  Comment: Falsely low lactic acid results can be found in samples   containing >=13.0 mg/dL total bilirubin and/or >=3.5 mg/dL   direct bilirubin.               Lymph #         0.8       Lymph %         14.2       MCH         28.7       MCHC         30.8       MCV         93       Mono #         0.6       Mono %         11.2       MPV         10.8       nRBC         1       Platelet Count         172       POCT Glucose 112       79         Potassium         3.6       RBC         3.90       RDW         21.5       Sodium         136       Vancomycin, Random         20.2       WBC         5.69                            All pertinent lab results from the past 24 hours have been reviewed.    Significant Imaging: I have reviewed and interpreted all pertinent imaging results/findings within the past 24 hours.

## 2024-04-16 NOTE — SUBJECTIVE & OBJECTIVE
Interval History: .imagines and labs show no acute process,acute metabolic encephalopathy  seems improving,consulted neurology,ordered EEG.    Review of Systems   Constitutional:  Positive for activity change and appetite change.   Respiratory:  Negative for apnea and chest tightness.    Gastrointestinal:  Negative for abdominal distention and abdominal pain.   Endocrine: Negative for cold intolerance and heat intolerance.   Genitourinary:  Negative for difficulty urinating and dyspareunia.   Neurological:  Positive for weakness.   Psychiatric/Behavioral:  Positive for confusion.      Objective:     Vital Signs (Most Recent):  Temp: 97 °F (36.1 °C) (04/16/24 0400)  Pulse: 96 (04/16/24 0858)  Resp: (!) 25 (04/16/24 0858)  BP: 124/72 (04/16/24 0800)  SpO2: 97 % (04/16/24 0858) Vital Signs (24h Range):  Temp:  [97 °F (36.1 °C)-97.9 °F (36.6 °C)] 97 °F (36.1 °C)  Pulse:  [77-96] 96  Resp:  [13-37] 25  SpO2:  [84 %-100 %] 97 %  BP: (103-148)/(64-93) 124/72     Weight: 47.6 kg (104 lb 14.4 oz)  Body mass index is 18.01 kg/m².    Intake/Output Summary (Last 24 hours) at 4/16/2024 1031  Last data filed at 4/15/2024 2100  Gross per 24 hour   Intake 125.9 ml   Output 500 ml   Net -374.1 ml         Physical Exam  HENT:      Left Ear: There is no impacted cerumen.   Cardiovascular:      Rate and Rhythm: Normal rate and regular rhythm.   Pulmonary:      Effort: No respiratory distress.      Breath sounds: No wheezing.   Abdominal:      General: There is no distension.   Musculoskeletal:         General: No swelling.   Neurological:      Mental Status: She is alert. She is disoriented.             Significant Labs: All pertinent labs within the past 24 hours have been reviewed.    Significant Imaging: I have reviewed all pertinent imaging results/findings within the past 24 hours.

## 2024-04-16 NOTE — PLAN OF CARE
Problem: Occupational Therapy  Goal: Occupational Therapy Goal  Description: Goals to be met by: 4/30/2024     Patient will increase functional independence with ADLs by performing:    Feeding with Modified [place] and Supervision.  UE Dressing with Contact Guard Assistance.  LE Dressing with Stand-by Assistance.  Grooming while seated with Set-up Assistance and Supervision.  Toileting from bedside commode with Set-up Assistance, Stand-by Assistance, and Assistive Devices as needed for hygiene and clothing management.   Sitting at edge of bed x30 minutes with Modified [place] and Supervision.  Rolling to Bilateral with Modified [place] and Supervision.   Supine to sit with Contact Guard Assistance.  Step transfer with Contact Guard Assistance  Toilet transfer to bedside commode with Contact Guard Assistance.  Upper extremity exercise program x10 reps per handout, with assistance as needed.    Outcome: Ongoing, Progressing     The patient will benefit from [facility].

## 2024-04-16 NOTE — PT/OT/SLP PROGRESS
Speech Language Pathology Treatment    Patient Name:  Eva Bloom   MRN:  [id number]  Admitting Diagnosis: Sepsis    Recommendations:                 General Recommendations:  Dysphagia therapy  Diet recommendations:  Puree, Liquid Diet Level: Thin   Aspiration Precautions:  crush meds    General Precautions: Standard, aspiration  Communication strategies:   calm tone    Assessment:     Eva Bloom is a 58 y.o. female with dx of oral dysphagia secondary to decreased mental status c/b poor attention to bolus presentation.     Subjective   Pt with increased alertness as compared to yesterday but remains confused, inconsistently responding to y/n questions.   Patient goals: pt unable to report    Pain/Comfort:   0    Respiratory Status: Room air    Objective:     Has the patient been evaluated by SLP for swallowing?   Yes  Keep patient NPO? no    Pt repositioned upright in bed but noted to scoot down. Max assist and tactile cuing needed for her to attend to limited sips of thin liquids via straw, no overt s/s of aspiration. She grossly was unable to attend to puree presentation, when placed byt ST in open mouth she was able to perform A-P transport, no visible lingula residue for small trial. Nursing reporting patient was able to take crushed meds with puree earlier today. MD informed of ST REC to initiate puree though it is unlikely she will meet nutritional needs in her current state.     Goals:   Multidisciplinary Problems       SLP Goals          Problem: SLP    Goal Priority Disciplines Outcome   SLP Goal    Medium SLP Ongoing, Progressing   Description: Pt will participate in ongoing assessment of swallow                       Plan:     Patient to be seen:    x2   Plan of Care expires:   4/25  Plan of Care reviewed with:    MD via secure chat and nursing  SLP Follow-Up:     Yes     Discharge recommendations:    TBD  Barriers to Discharge:  Level of Skilled Assistance Needed .    Time Tracking:     SLP  Treatment Date:   04/16/24  Speech Start Time:  1200  Speech Stop Time:  1210     Speech Total Time (min):  10 min    Billable Minutes: Treatment Swallowing Dysfunction 10    04/16/2024

## 2024-04-16 NOTE — CONSULTS
[facility] - Intensive Care  Neurology  Consult Note    Patient Name: Eva Bloom  MRN: [id number]  Admission Date: 4/13/2024  Hospital Length of Stay: 3 days  Code Status: Full Code   Attending Provider: José Miguel Cristina, *   Consulting Provider: Patria Newton MD  Primary Care Physician: Sowmya Mccain MD  Principal Problem:Sepsis    Inpatient consult to Neurology  Consult performed by: Patria Newton MD  Consult ordered by: José Miguel Cristina MD         Subjective:     Chief Complaint:  Encephalopathy      HPI:   Ms. Bloom is a 58 year old female, with medical history of Afib, CHF NYHA class II, chronic, systolic, CVA, ESRD on dialysis (Tues, Thurs, Sat), HTN, Hx of kidney cancer, hyperparathyroidism, myocardiopathy, and prediabetes with admission concerns of sepsis. Hospital course complicated by reduced level of consciousness / worsening encephalopathy. And neurology has been consulted for the same.     History from medical records review. As per report - was noted to be lethargic / with reduced follow of commands and level of awareness. No reported clinical seizures or complex migranines. No focal motor or cranial nerve deficits. CT head negative for acute findings / CTA was suboptimal however negative for any obvious LVO.     Improvement in overall level of alertness and consciousness over the ICU stay. Awake and alert / still remains encephalopathic - less participatory to orientation questions / marjorly non verbal - no focal motor or cranial nerve deficits.      Past Medical History:   Diagnosis Date    Anemia     Anticoagulant long-term use     Atrial fibrillation     Bronchitis 03/01/2017    Cancer 2016    kidney cancer    CHF (congestive heart failure), NYHA class II, chronic, systolic     CMV (cytomegalovirus) antibody positive     CVA (cerebral vascular accident) 1/3/2024    Encounter for blood transfusion     ESRD (end stage renal disease)     Essential hypertension  09/23/2015    H/O herpes simplex type 2 infection     Herpes simplex type 1 antibody positive     History of kidney cancer     s/p left nephrectomy 1/2016    Hyperparathyroidism, unspecified     Hyperuricemia without signs of inflammatory arthritis and tophaceous disease     Kidney stones     LGSIL (low grade squamous intraepithelial dysplasia)     Myocardiopathy 07/21/2017    Prediabetes     Proteinuria     Renal disorder     Thyroid nodule     Urate nephropathy        Past Surgical History:   Procedure Laterality Date    BREAST CYST EXCISION      COLONOSCOPY N/A 11/12/2015    COLONOSCOPY N/A 03/12/2021    Procedure: COLONOSCOPY;  Surgeon: Brendon Lanier MD;  Location: [facility] ENDO;  Service: Endoscopy;  Laterality: N/A;  covid test 3/9, labs prior, prep instr mailed -ml    EXCISION OF ARTERIOVENOUS FISTULA Left 09/27/2023    Procedure: EXCISION, AV FISTULA;  Surgeon: FARIDEH Muñoz III, MD;  [facility];  Service: Vascular;  Laterality: Left;  LUE AV graft excision    INSERTION OF BIVENTRICULAR IMPLANTABLE CARDIOVERTER-DEFIBRILLATOR (ICD)  04/2021    INSERTION OF TUNNELED CENTRAL VENOUS CATHETER (CVC) WITH SUBCUTANEOUS PORT N/A 01/25/2023    Procedure: INSERTION, DUAL LUMEN CATHETER WITH PORT, WITH IMAGING GUIDANCE;  Surgeon: FARIDEH Muñoz III, MD;  Location: [facility] OR [facility] FLR;  Service: Peripheral Vascular;  Laterality: N/A;  possinble permcath placment    NEPHRECTOMY-LAPAROSCOPIC Left 01/12/2016    PERCUTANEOUS TRANSLUMINAL ANGIOPLASTY OF ARTERIOVENOUS FISTULA Left 04/19/2023    Procedure: PTA, AV FISTULA;  Surgeon: FARIDEH Muñoz III, MD;  Location: [facility] CATH LAB;  Service: Peripheral Vascular;  Laterality: Left;    PERITONEAL CATHETER INSERTION      Permacath insertion  01/12/2017    PLACEMENT OF ARTERIOVENOUS GRAFT  12/28/2022    Procedure: INSERTION, GRAFT, ARTERIOVENOUS;  Surgeon: FARIDEH Muñoz III, MD;  Location: [facility] OR [facility] FLR;  Service: Peripheral Vascular;;    REMOVAL, GRAFT,  ARTERIOVENOUS, UPPER EXTREMITY Left 01/25/2023    Procedure: REMOVAL, GRAFT, ARTERIOVENOUS, UPPER EXTREMITY;  Surgeon: FARIDEH Muñoz III, MD;  Location: [facility] OR [facility]LR;  Service: Peripheral Vascular;  Laterality: Left;    REVISION OF ARTERIOVENOUS FISTULA Left 05/01/2023    Procedure: REVISION, AV FISTULA;  Surgeon: FARIDEH Muñoz III, MD;  Location: [facility] OR 2ND FLR;  Service: Peripheral Vascular;  Laterality: Left;  LUE AVG revision    REVISION OF PROCEDURE INVOLVING ARTERIOVENOUS GRAFT Left 12/28/2022    Procedure: REVISION, PROCEDURE INVOLVING ARTERIOVENOUS GRAFT;  Surgeon: FARIDEH Muñoz III, MD;  Location: [facility] OR 2ND FLR;  Service: Peripheral Vascular;  Laterality: Left;    REVISION OF PROCEDURE INVOLVING ARTERIOVENOUS GRAFT Left 07/21/2023    Procedure: LEFT ARM ARTERIOVENOUS GRAFT EXCISION  AND LIGATION;  Surgeon: Obi Werner MD;  Location: [facility] OR;  Service: Vascular;  Laterality: Left;  Left AVG excision and revision with interposition    SALPINGOOPHORECTOMY Right 2016    KJB---DAVINCI    TONSILLECTOMY      TUBAL LIGATION         Review of patient's allergies indicates:   Allergen Reactions    Carvedilol Other (See Comments)     Nausea/vomiting    Allopurinol      Other reaction(s): abnormal transaminases       Current Neurological Medications:     Current Facility-Administered Medications   Medication Dose Route Frequency Provider Last Rate Last Admin    acetaminophen tablet 650 mg  650 mg Oral Q6H PRN José Miguel Cristina MD        aspirin EC tablet 81 mg  81 mg Oral Daily José Miguel Cristina MD   81 mg at 04/16/24 1006    atorvastatin tablet 40 mg  40 mg Oral Daily José Miguel Cristina MD   40 mg at 04/16/24 1006    ceFEPIme (MAXIPIME) 1 g in dextrose 5 % in water (D5W) 100 mL IVPB (MB+)  1 g Intravenous Q24H José Miguel Cristina MD   Stopped at 04/16/24 0923    dextrose 10% bolus 125 mL 125 mL  12.5 g Intravenous PRN José Miguel Cristina MD        dextrose 10% bolus  250 mL 250 mL  25 g Intravenous PRN José Miguel Cristina MD        dextrose 5 % and 0.45 % NaCl infusion   Intravenous Continuous José Miguel Cristina MD        famotidine tablet 20 mg  20 mg Oral Daily José Miguel Cristina MD   20 mg at 04/16/24 1006    glucagon (human recombinant) injection 1 mg  1 mg Intramuscular PRN José Miguel Cristina MD        glucose chewable tablet 16 g  16 g Oral PRN José Miguel Cristina MD        glucose chewable tablet 24 g  24 g Oral PRN José Miguel Cristina MD        levothyroxine tablet 75 mcg  75 mcg Oral Before breakfast José Miguel Cristina MD   75 mcg at 04/15/24 0611    metoprolol tartrate (LOPRESSOR) tablet 25 mg  25 mg Oral BID José Miguel Cristina MD   25 mg at 04/16/24 1044    mupirocin 2 % ointment   Nasal BID José Miguel Cristina MD   Given at 04/16/24 1015    vancomycin - pharmacy to dose   Intravenous pharmacy to manage frequency José Miguel Cristina MD         Facility-Administered Medications Ordered in Other Encounters   Medication Dose Route Frequency Provider Last Rate Last Admin    0.9%  NaCl infusion   Intravenous Continuous Soni Bhatti MD   New Bag at 07/21/23 1116     Family History       Problem Relation (Age of Onset)    Cataracts Maternal Aunt    Diabetes Father, Maternal Grandfather    Heart disease Sister    Hypertension Mother    Kidney disease Father    No Known Problems Sister, Sister, Brother, Brother, Maternal Uncle, Paternal Aunt, Paternal Uncle, Maternal Grandmother, Paternal Grandmother, Paternal Grandfather, Son, Son, Other          Tobacco Use    Smoking status: Never     Passive exposure: Never    Smokeless tobacco: Never   Substance and Sexual Activity    Alcohol use: No    Drug use: Yes     Types: Hydrocodone    Sexual activity: Not Currently     Review of Systems   Unable to perform ROS: Acuity of condition   Neurological:  Negative for seizures, facial asymmetry and weakness.   Psychiatric/Behavioral:   "Positive for confusion. Negative for agitation.      Objective:     Vital Signs (Most Recent):  Temp: 97 °F (36.1 °C) (04/16/24 1100)  Pulse: 88 (04/16/24 1200)  Resp: (!) 33 (04/16/24 1200)  BP: 118/76 (04/16/24 1200)  SpO2: 96 % (04/16/24 1200) Vital Signs (24h Range):  Temp:  [97 °F (36.1 °C)-97.7 °F (36.5 °C)] 97 °F (36.1 °C)  Pulse:  [77-96] 88  Resp:  [13-37] 33  SpO2:  [84 %-99 %] 96 %  BP: (103-148)/(64-96) 118/76     Weight: 47.6 kg (104 lb 14.4 oz)  Body mass index is 18.01 kg/m².     Physical Exam  HENT:      Head: Normocephalic and atraumatic.   Eyes:      Extraocular Movements: Extraocular movements intact and EOM normal.   Pulmonary:      Effort: No respiratory distress.   Neurological:      Mental Status: She is alert.          NEUROLOGICAL EXAMINATION:     MENTAL STATUS   Level of consciousness: alert       Not following commands   Non verbal majorly / less participatory      CRANIAL NERVES     CN III, IV, VI   Extraocular motions are normal.   Nystagmus: none   Ophthalmoparesis: none    CN VII   Facial expression full, symmetric.     MOTOR EXAM        No new focal motor deficits       SENSORY EXAM        No new focal sensory deficits       GAIT AND COORDINATION     Tremor   Resting tremor: absent      Significant Labs: Hemoglobin A1c: No results for input(s): "HGBA1C" in the last 720 hours.  Blood Culture: No results for input(s): "LABBLOO" in the last 48 hours.  BMP:   Recent Labs   Lab 04/15/24  0454 04/16/24  0401   GLU 34* 71    136   K 5.1 3.6    99   CO2 11* 23   BUN 56* 26*   CREATININE 6.4* 4.2*   CALCIUM 10.8* 11.5*     CBC:   Recent Labs   Lab 04/15/24  0454 04/16/24  0401   WBC 7.99 5.69   HGB 11.7* 11.2*   HCT 40.5 36.4*    172     CMP:   Recent Labs   Lab 04/15/24  0454 04/16/24  0401   GLU 34* 71    136   K 5.1 3.6    99   CO2 11* 23   BUN 56* 26*   CREATININE 6.4* 4.2*   CALCIUM 10.8* 11.5*   ANIONGAP 26* 14     CSF Culture: No results for input(s): " ""CSFCULTURE" in the last 48 hours.  CSF Studies: No results for input(s): "ALIQUT", "APPEARCSF", "COLORCSF", "CSFWBC", "CSFRBC", "GLUCCSF", "LDHCSF", "PROTEINCSF", "VDRLCSF" in the last 48 hours.  Inflammatory Markers: No results for input(s): "SEDRATE", "CRP", "PROCAL" in the last 48 hours.  POCT Glucose:   Recent Labs   Lab 04/16/24  0309 04/16/24  0820 04/16/24  1145   POCTGLUCOSE 74 79 112*     Prealbumin: No results for input(s): "PREALBUMIN" in the last 48 hours.  Respiratory Culture: No results for input(s): "GSRESP", "RESPIRATORYC" in the last 48 hours.  Urine Culture: No results for input(s): "LABURIN" in the last 48 hours.  Urine Studies: No results for input(s): "COLORU", "APPEARANCEUA", "PHUR", "SPECGRAV", "PROTEINUA", "GLUCUA", "KETONESU", "BILIRUBINUA", "OCCULTUA", "NITRITE", "UROBILINOGEN", "LEUKOCYTESUR", "RBCUA", "WBCUA", "BACTERIA", "SQUAMEPITHEL", "HYALINECASTS" in the last 48 hours.    Invalid input(s): "WRIGHTSUR"  Recent Lab Results  (Last 5 results in the past 24 hours)        04/16/24  1145   04/16/24  0950   04/16/24  0828   04/16/24  0820   04/16/24  0401        Ammonia     29           Anion Gap         14       Baso #         0.09       Basophil %         1.6       BUN         26       Calcium         11.5       Chloride         99       CO2         23       Creatinine         4.2       Differential Method         Automated       eGFR         12       Eos #         0.2       Eos %         3.3       Glucose         71       Gran # (ANC)         3.9       Gran %         69.3       Hematocrit         36.4       Hemoglobin         11.2       Immature Grans (Abs)         0.02  Comment: Mild elevation in immature granulocytes is non specific and   can be seen in a variety of conditions including stress response,   acute inflammation, trauma and pregnancy. Correlation with other   laboratory and clinical findings is essential.         Immature Granulocytes         0.4       Lactic Acid Level   " 2.1  Comment: Falsely low lactic acid results can be found in samples   containing >=13.0 mg/dL total bilirubin and/or >=3.5 mg/dL   direct bilirubin.               Lymph #         0.8       Lymph %         14.2       MCH         28.7       MCHC         30.8       MCV         93       Mono #         0.6       Mono %         11.2       MPV         10.8       nRBC         1       Platelet Count         172       POCT Glucose 112       79         Potassium         3.6       RBC         3.90       RDW         21.5       Sodium         136       Vancomycin, Random         20.2       WBC         5.69                            All pertinent lab results from the past 24 hours have been reviewed.    Significant Imaging: I have reviewed and interpreted all pertinent imaging results/findings within the past 24 hours.  Assessment and Plan:     Acute metabolic encephalopathy  Ms. Bloom is a 58 year old female, with medical history of Afib, CHF NYHA class II, chronic, systolic, CVA, ESRD on dialysis (Tues, Thurs, Sat), HTN, Hx of kidney cancer, hyperparathyroidism, myocardiopathy, and prediabetes with admission concerns of sepsis. Hospital course complicated by reduced level of consciousness / worsening encephalopathy. And neurology has been consulted for the same.     History from medical records review. As per report - was noted to be lethargic / with reduced follow of commands and level of awareness. No reported clinical seizures or complex migranines. No focal motor or cranial nerve deficits. CT head negative for acute findings / CTA was suboptimal however negative for any obvious LVO.     Improvement in overall level of alertness and consciousness over the ICU stay. Awake and alert / still remains encephalopathic - less participatory to orientation questions / marjorly non verbal - no focal motor or cranial nerve deficits.     Recs:   Encephalopathy - suspect metabolic / systemic infectious with confounders of hypoactive  delirium. Low suspicion for focal cerebrovascular ischemic event or epileptic or neuro inflammatory etiology - however needs rule out / given background of off AC     MRI brain wo contrast   - resume AC if no absolute contraindications   EEG routine     Encephalopathy evaluation and management  -- Correct lytes as needed / investigate and control infection if any / avoid hypoxia or hypercapnia / avoid hypoglycemia / control uremia - avoid rapid correction / avoid-correct metabolic-respiratory acidosis or alkalosis   -- Correct any nutritional deficiencies / correct anemia / provide nutritional support as appropriate / ambulate when appropriate   -- PT/OT evaluation and management     Delirium precautions    - Recommend PRN depacon 250 mg IV qam and 500 mg IV qhs for management of agitation and behavioral disturbances associated with delirium.  - Recommend Zyprexa 2.5 mg PO/IM q6h PRN severe, nonredirectable agitation.  Max dose is 30 mg daily from all sources.  Parenteral Zyprexa is not to be given within 1 hours of parenteral benzodiazepines including Ativan.    - Recommend standard delirium precautions:               - Avoid use of physical restraints and judiciously use chemical sedatives for management of agitation              - Avoid deliriogenic agents if possible including benzodiazepines, anticholinergics, and opiates              - Normalize patient's sleep-wake cycle if possible              - Environmental adjustments to include bright lighting and windows open during the day              - Early involvement in PT/OT              - Recommend physical sitter to be present at bedside.            VTE Risk Mitigation (From admission, onward)      None            Thank you for your consult.     Patria Newton MD  Neurology  [facility] [place] - Intensive Care

## 2024-04-16 NOTE — CONSULTS
[facility] - Intensive Care  Wound Care  [facility]    Patient Name:  Eva Bloom   MRN:  [id number]  Date: 4/16/2024  Diagnosis: Sepsis    History:     Past Medical History:   Diagnosis Date    Anemia     Anticoagulant long-term use     Atrial fibrillation     Bronchitis 03/01/2017    Cancer 2016    kidney cancer    CHF (congestive heart failure), NYHA class II, chronic, systolic     CMV (cytomegalovirus) antibody positive     CVA (cerebral vascular accident) 1/3/2024    Encounter for blood transfusion     ESRD (end stage renal disease)     Essential hypertension 09/23/2015    H/O herpes simplex type 2 infection     Herpes simplex type 1 antibody positive     History of kidney cancer     s/p left nephrectomy 1/2016    Hyperparathyroidism, unspecified     Hyperuricemia without signs of inflammatory arthritis and tophaceous disease     Kidney stones     LGSIL (low grade squamous intraepithelial dysplasia)     Myocardiopathy 07/21/2017    Prediabetes     Proteinuria     Renal disorder     Thyroid nodule     Urate nephropathy        Social History     Socioeconomic History    Marital status:     Number of children: 2   Occupational History    Occupation: [email or web address]     Employer: [id number]   Tobacco Use    Smoking status: Never     Passive exposure: Never    Smokeless tobacco: Never   Substance and Sexual Activity    Alcohol use: No    Drug use: Yes     Types: Hydrocodone    Sexual activity: Not Currently     Social Determinants of Health     Financial Resource Strain: Low Risk  (9/28/2023)    Overall Financial Resource Strain (CARDIA)     Difficulty of Paying Living Expenses: Not hard at all   Food Insecurity: No Food Insecurity (9/28/2023)    Hunger Vital Sign     Worried About Running Out of Food in the Last Year: Never true     Ran Out of Food in the Last Year: Never true   Transportation Needs: No Transportation Needs (9/28/2023)    PRAPARE - Transportation     Lack of Transportation (Medical): No      Lack of Transportation (Non-Medical): No   Physical Activity: Inactive (9/28/2023)    Exercise Vital Sign     Days of Exercise per Week: 0 days     Minutes of Exercise per Session: 0 min   Stress: No Stress Concern Present (9/28/2023)    [employer] [place] [employer] - Occupational Stress Questionnaire     Feeling of Stress : Not at all   Social Connections: Moderately Integrated (9/28/2023)    Social Connection and Isolation Panel [NHANES]     Frequency of Communication with Friends and Family: More than three times a week     Frequency of Social Gatherings with Friends and Family: More than three times a week     Attends Sabianist Services: More than 4 times per year     Active Member of Clubs or Organizations: No     Attends Club or Organization Meetings: Never     Marital Status:    Housing Stability: Low Risk  (9/28/2023)    Housing Stability Vital Sign     Unable to Pay for Housing in the Last Year: No     Number of Places Lived in the Last Year: 1     Unstable Housing in the Last Year: No       Precautions:     Allergies as of 04/13/2024 - Reviewed 04/13/2024   Allergen Reaction Noted    Carvedilol Other (See Comments) 05/17/2017    Allopurinol  07/17/2012       [facility] Assessment Details/Treatment     Active Problem List with Overview Notes    Diagnosis Date Noted    Somnolence 04/15/2024    Altered mental status 04/12/2024    Ischemic ulcer of left heel due to atherosclerosis 03/04/2024    Incontinence associated dermatitis 01/04/2024    Acute metabolic encephalopathy 01/02/2024    Acute blood loss anemia 11/01/2023    PAD (peripheral artery disease) 11/01/2023    Hypothermia 10/12/2023    Severe protein-calorie malnutrition 10/11/2023    ACP (advance care planning) 10/11/2023    Weight loss 10/10/2023    Body mass index (BMI) less than 19 09/28/2023    Hoarseness of voice 09/21/2023    Sensation of fullness in both ears 09/21/2023    Chronic combined systolic and diastolic heart failure  "08/25/2023    Metastatic renal cell carcinoma to lung, unspecified laterality 08/11/2023    Hemorrhoid 07/24/2023    Critical limb ischemia of left lower extremity with gangrene 07/20/2023    Ischemic ulcer of toe of left foot, with unspecified severity 07/19/2023    Hypothyroidism 07/19/2023    Aortic atherosclerosis 07/06/2023    Arteriovenous graft infection 01/24/2023    Malfunction of arteriovenous dialysis fistula 12/16/2022    Debility 11/04/2022    Sepsis 10/29/2022    PAF (paroxysmal atrial fibrillation) 07/25/2022    Glucose intolerance 04/18/2022    Hypercalcemia 04/04/2022    ICD (implantable cardioverter-defibrillator) in place - SubQ 07/15/2021    Hyperphosphatemia     Anemia in ESRD (end-stage renal disease)     Chronic kidney disease-mineral and bone disorder     Chronic combined systolic and diastolic congestive heart failure     Nonrheumatic mitral valve regurgitation 06/01/2020    ESRD (end stage renal disease) on dialysis      - Tuesday, Thursday, and Saturday  - Chelsie Hart, nephrology      Cardiomyopathy, nonischemic 07/21/2017    Pulmonary hypertension 07/21/2017    Primary insomnia 06/14/2017    Stenosis of arteriovenous dialysis fistula 02/17/2017    S/p nephrectomy left 10/07/2016    History of kidney cancer 09/02/2016    Renal cell carcinoma of left kidney 01/27/2016    Multinodular goiter 11/25/2015     - benign FNA 12/2015      CMV (cytomegalovirus) antibody positive     Herpes simplex type 1 antibody positive     H/O herpes simplex type 2 infection      - positive antibody test  - reports no hx of outbreaks      Essential hypertension 09/23/2015    Pap smear abnormality of cervix with ASCUS favoring benign 09/23/2015     She is having the PAP "once a year and comes back ok she says"      Urate nephropathy     Kidney stones     Hyperparathyroidism, secondary renal     Hyperuricemia without signs of inflammatory arthritis and tophaceous disease     Proteinuria       Nursing " consult for x 3 wounds  A 58 year old female admitted 4/13/24 from home with complaint of palpitations. Patient was in HD- treatment Tuesday, Thursday, and Saturday.  4/16 WBC 5.69 Hgb 11.2 Hct 36.4  4/13 Alb 3.5 Weight 104.7 lbs   On Isolibrium mattress; Marcelino score 12  4/13 3:26 pm 4 Eyes Skin Assessment- Pressure Injuries previously documented   3/25 Follow up at outpatient wound clinic- wound right thigh, left heel and second toe- painting left heel and second toe with betadine daily & foam border dressing to thigh wound weekly; significant proximal arterial disease of LLE  Assessment:  Photodocumentation    Right lateral thigh- Chronic wound resurfaced with epithelium. Located in scar.     Left heel- Dry eschar 6 cm circular wound. Stable eschar without surrounding erythema. Moving legs in bed. Unstageable pressure injury    Left 3rd toe- Dry ulceration with dry necrotic roof. History of proximal arterial disease  Treatment/Plan:  Continue local wound care as per treatment plan developed in outpatient wound care.  Nursing to continue Pressure Injury Prevention Interventions  Recommendations made to primary team. Orders placed.     04/16/2024

## 2024-04-16 NOTE — CLINICAL REVIEW
Sepsis Screen (most recent)       Sepsis Screen () - 04/15/24 8641       Is the patient's history or complaint suggestive of a possible infection? Yes  -    Are there at least two of the following signs and symptoms present? Yes  -    Sepsis signs/symptoms - Tachycardia Tachycardia     >90  -    Sepsis signs/symptoms - Tachypnea Tachypnea     >20  -    Sepsis signs/symptoms - Altered Mental Status Altered Mental Status  -    Are any of the following organ dysfunction criteria present and not considered to be due to a chronic condition? Yes   ESRD-HD -    Organ Dysfunction Criteria Lactate > 2.0  -    Initiate Sepsis Protocol No  -    Reason sepsis not considered Pt. receiving appropriate management  -              User Key  (r) = Recorded By, (t) = Taken By, (c) = Cosigned By      Initials Name    Kandis Tenorio RN

## 2024-04-16 NOTE — PROGRESS NOTES
Pharmacokinetic Assessment Follow Up: IV Vancomycin    Vancomycin serum concentration assessment(s):    The random level was drawn correctly and can be used to guide therapy at this time. The measurement is above the desired definitive target range of 10 to 20 mcg/mL.    Vancomycin Regimen Plan:  No dose today  Re-dose when the random level is less than 20 mcg/mL, next level to be drawn at 0300 on 4/18/2024    Drug levels (last 3 results):  Recent Labs   Lab Result Units 04/14/24  0441 04/15/24  0454 04/16/24  0401   Vancomycin, Random ug/mL 17.7 26.1 20.2       Pharmacy will continue to follow and monitor vancomycin.    Please contact pharmacy at extension [phone] for questions regarding this assessment.    Thank you for the consult,   Eddie Brooks Jr       Patient brief summary:  Eva Bloom is a 58 y.o. female initiated on antimicrobial therapy with IV Vancomycin for treatment of sepsis    Drug Allergies:   Review of patient's allergies indicates:   Allergen Reactions    Carvedilol Other (See Comments)     Nausea/vomiting    Allopurinol      Other reaction(s): abnormal transaminases       Actual Body Weight:   47.6 kg    Renal Function:   Estimated Creatinine Clearance: 10.8 mL/min (A) (based on SCr of 4.2 mg/dL (H)).,     Dialysis Method (if applicable):  intermittent HD    CBC (last 72 hours):  Recent Labs   Lab Result Units 04/13/24  1104 04/14/24  0441 04/15/24  0454 04/16/24  0401   WBC K/uL 4.12 7.97 7.99 5.69   Hemoglobin g/dL 12.9 11.1* 11.7* 11.2*   Hematocrit % 42.4 37.2 40.5 36.4*   Platelets K/uL 152 143* 189 172   Gran % % 64.8 67.1 60.5 69.3   Lymph % % 19.9 16.1* 20.0 14.2*   Mono % % 9.2 13.3 14.3 11.2   Eosinophil % % 4.4 1.9 2.8 3.3   Basophil % % 1.2 0.8 1.5 1.6   Differential Method  Automated Automated Automated Automated       Metabolic Panel (last 72 hours):  Recent Labs   Lab Result Units 04/13/24  1104 04/14/24  0441 04/15/24  0454 04/16/24  0401   Sodium mmol/L 139 141 141 136    Potassium mmol/L 3.9 4.9 5.1 3.6   Chloride mmol/L 102 104 104 99   CO2 mmol/L 19* 14* 11* 23   Glucose mg/dL 65* 54* 34* 71   BUN mg/dL 22* 38* 56* 26*   Creatinine mg/dL 3.7* 4.9* 6.4* 4.2*   Albumin g/dL 3.5  --   --   --    Total Bilirubin mg/dL 1.2*  --   --   --    Alkaline Phosphatase U/L 509*  --   --   --    AST U/L 44*  --   --   --    ALT U/L 22  --   --   --    Magnesium mg/dL 2.4  --   --   --    Phosphorus mg/dL 4.0  --   --   --        Vancomycin Administrations:  vancomycin given in the last 96 hours                     vancomycin (VANCOCIN) 500 mg in dextrose 5 % in water (D5W) 100 mL IVPB (MB+) (mg) 500 mg New Bag 04/14/24 0740    vancomycin (VANCOCIN) 1,000 mg in dextrose 5 % (D5W) 250 mL IVPB (Vial-Mate) ()  Restarted 04/13/24 1458      Restarted  1423     1,000 mg New Bag  1335                    Microbiologic Results:  Microbiology Results (last 7 days)       Procedure Component Value Units Date/Time    Blood culture x two cultures. Draw prior to antibiotics. [[id number]] Collected: 04/13/24 1103    Order Status: Completed Specimen: Blood from Peripheral, Forearm, Right Updated: 04/15/24 1503     Blood Culture, Routine No Growth to date      No Growth to date      No Growth to date    Narrative:      Aerobic and anaerobic    Blood culture x two cultures. Draw prior to antibiotics. [[id number]] Collected: 04/13/24 1126    Order Status: Completed Specimen: Blood from Peripheral, Hand, Right Updated: 04/15/24 1503     Blood Culture, Routine No Growth to date      No Growth to date      No Growth to date    Narrative:      Aerobic and anaerobic    Culture, Anaerobe [[id number]]     Order Status: Canceled Specimen: Body Fluid     Aerobic culture [[id number]]     Order Status: Canceled Specimen: Pleural Fluid

## 2024-04-16 NOTE — TELEPHONE ENCOUNTER
I spoke to , Otis. I told him we would follow along in the chart and he could call us if he needs anything. I told him we will schedule a follow up appointment when she is discharged.

## 2024-04-16 NOTE — PLAN OF CARE
Problem: Physical Therapy  Goal: Physical Therapy Goal  Description: Goals to be met by: 24     Patient will increase functional independence with mobility by performin. Supine to sit with Minimal Assistance  2. Rolling to Left and Right with Minimal Assistance  3. Sit to stand transfer with Minimal Assistance using RW  4. Bed to chair transfer with Minimal Assistance using Rolling Walker  5. Gait x50 feet with Minimal Assistance using Rolling Walker   6. Wheelchair propulsion x100 feet with Minimal Assistance using bilateral upper extremities  7. Lower extremity exercise program 2 sets x10 reps per handout, with independence    Outcome: Ongoing, Progressing     Pt will benefit from moderate intensity therapy.

## 2024-04-16 NOTE — PLAN OF CARE
Problem: Adult Inpatient Plan of Care  Goal: Plan of Care Review  Outcome: Ongoing, Progressing  Goal: Patient-Specific Goal (Individualized)  Outcome: Ongoing, Progressing  Goal: Absence of Hospital-Acquired Illness or Injury  Outcome: Ongoing, Progressing  Goal: Optimal Comfort and Wellbeing  Outcome: Ongoing, Progressing  Goal: Readiness for Transition of Care  Outcome: Ongoing, Progressing     Problem: Adjustment to Illness (Sepsis/Septic Shock)  Goal: Optimal Coping  Outcome: Ongoing, Progressing     Problem: Glycemic Control Impaired (Sepsis/Septic Shock)  Goal: Blood Glucose Level Within Desired Range  Outcome: Ongoing, Progressing     Problem: Infection Progression (Sepsis/Septic Shock)  Goal: Absence of Infection Signs and Symptoms  Outcome: Ongoing, Progressing     Problem: Nutrition Impaired (Sepsis/Septic Shock)  Goal: Optimal Nutrition Intake  Outcome: Ongoing, Progressing     Problem: Infection  Goal: Absence of Infection Signs and Symptoms  Outcome: Ongoing, Progressing     Problem: Fall Injury Risk  Goal: Absence of Fall and Fall-Related Injury  Outcome: Ongoing, Progressing     Problem: Skin Injury Risk Increased  Goal: Skin Health and Integrity  Outcome: Ongoing, Progressing     Problem: Device-Related Complication Risk (Hemodialysis)  Goal: Safe, Effective Therapy Delivery  Outcome: Ongoing, Progressing

## 2024-04-16 NOTE — HPI
Ms. Bloom is a 58 year old female, with medical history of Afib, CHF NYHA class II, chronic, systolic, CVA, ESRD on dialysis (Tues, Thurs, Sat), HTN, Hx of kidney cancer, hyperparathyroidism, myocardiopathy, and prediabetes with admission concerns of sepsis. Hospital course complicated by reduced level of consciousness / worsening encephalopathy. And neurology has been consulted for the same.     History from medical records review. As per report - was noted to be lethargic / with reduced follow of commands and level of awareness. No reported clinical seizures or complex migranines. No focal motor or cranial nerve deficits. CT head negative for acute findings / CTA was suboptimal however negative for any obvious LVO.     Improvement in overall level of alertness and consciousness over the ICU stay. Awake and alert / still remains encephalopathic - less participatory to orientation questions / marjorly non verbal - no focal motor or cranial nerve deficits.

## 2024-04-16 NOTE — ASSESSMENT & PLAN NOTE
Ms. Bloom is a 58 year old female, with medical history of Afib, CHF NYHA class II, chronic, systolic, CVA, ESRD on dialysis (Tues, Thurs, Sat), HTN, Hx of kidney cancer, hyperparathyroidism, myocardiopathy, and prediabetes with admission concerns of sepsis. Hospital course complicated by reduced level of consciousness / worsening encephalopathy. And neurology has been consulted for the same.     History from medical records review. As per report - was noted to be lethargic / with reduced follow of commands and level of awareness. No reported clinical seizures or complex migranines. No focal motor or cranial nerve deficits. CT head negative for acute findings / CTA was suboptimal however negative for any obvious LVO.     Improvement in overall level of alertness and consciousness over the ICU stay. Awake and alert / still remains encephalopathic - less participatory to orientation questions / marjorly non verbal - no focal motor or cranial nerve deficits.     Recs:   Encephalopathy - suspect metabolic / systemic infectious with confounders of hypoactive delirium. Low suspicion for focal cerebrovascular ischemic event or epileptic or neuro inflammatory etiology - however needs rule out / given background of off AC     MRI brain wo contrast   - resume AC if no absolute contraindications   EEG routine     Encephalopathy evaluation and management  -- Correct lytes as needed / investigate and control infection if any / avoid hypoxia or hypercapnia / avoid hypoglycemia / control uremia - avoid rapid correction / avoid-correct metabolic-respiratory acidosis or alkalosis   -- Correct any nutritional deficiencies / correct anemia / provide nutritional support as appropriate / ambulate when appropriate   -- PT/OT evaluation and management     Delirium precautions    - Recommend PRN depacon 250 mg IV qam and 500 mg IV qhs for management of agitation and behavioral disturbances associated with delirium.  - Recommend Zyprexa  2.5 mg PO/IM q6h PRN severe, nonredirectable agitation.  Max dose is 30 mg daily from all sources.  Parenteral Zyprexa is not to be given within 1 hours of parenteral benzodiazepines including Ativan.    - Recommend standard delirium precautions:               - Avoid use of physical restraints and judiciously use chemical sedatives for management of agitation              - Avoid deliriogenic agents if possible including benzodiazepines, anticholinergics, and opiates              - Normalize patient's sleep-wake cycle if possible              - Environmental adjustments to include bright lighting and windows open during the day              - Early involvement in PT/OT              - Recommend physical sitter to be present at bedside.

## 2024-04-16 NOTE — PROGRESS NOTES
"More alert today     Opens her eyes briefly not following commands     HPI/ROS unable to obtain     CXR terrible cardiomegaly     Lactic acidosis noted     Hypercalcemia noted 11.5 alb 3.5     at bedside He tells me that she has hx of cancer was on "pills". Her Dr name is Miguel    Past Medical History:   Diagnosis Date    Anemia     Anticoagulant long-term use     Atrial fibrillation     Bronchitis 03/01/2017    Cancer 2016    kidney cancer    CHF (congestive heart failure), NYHA class II, chronic, systolic     CMV (cytomegalovirus) antibody positive     CVA (cerebral vascular accident) 1/3/2024    Encounter for blood transfusion     ESRD (end stage renal disease)     Essential hypertension 09/23/2015    H/O herpes simplex type 2 infection     Herpes simplex type 1 antibody positive     History of kidney cancer     s/p left nephrectomy 1/2016    Hyperparathyroidism, unspecified     Hyperuricemia without signs of inflammatory arthritis and tophaceous disease     Kidney stones     LGSIL (low grade squamous intraepithelial dysplasia)     Myocardiopathy 07/21/2017    Prediabetes     Proteinuria     Renal disorder     Thyroid nodule     Urate nephropathy      Past Surgical History:   Procedure Laterality Date    BREAST CYST EXCISION      COLONOSCOPY N/A 11/12/2015    COLONOSCOPY N/A 03/12/2021    Procedure: COLONOSCOPY;  Surgeon: Brendon Lanier MD;  Location: [facility];  Service: Endoscopy;  Laterality: N/A;  covid test 3/9, labs prior, prep instr mailed -ml    EXCISION OF ARTERIOVENOUS FISTULA Left 09/27/2023    Procedure: EXCISION, AV FISTULA;  Surgeon: FARIDEH Muñoz III, MD;  Location: [facility] OR [address] [place] [address];  Service: Vascular;  Laterality: Left;  LUE AV graft excision    INSERTION OF BIVENTRICULAR IMPLANTABLE CARDIOVERTER-DEFIBRILLATOR (ICD)  04/2021    INSERTION OF TUNNELED CENTRAL VENOUS CATHETER (CVC) WITH SUBCUTANEOUS PORT N/A 01/25/2023    Procedure: INSERTION, DUAL LUMEN CATHETER WITH PORT, WITH " IMAGING GUIDANCE;  Surgeon: FARIDEH Muñoz III, MD;  Location: [facility] OR [facility];  Service: Peripheral Vascular;  Laterality: N/A;  possinble permcath placment    NEPHRECTOMY-LAPAROSCOPIC Left 01/12/2016    PERCUTANEOUS TRANSLUMINAL ANGIOPLASTY OF ARTERIOVENOUS FISTULA Left 04/19/2023    Procedure: PTA, AV FISTULA;  Surgeon: FARIDEH Muñoz III, MD;  Location: [facility] CATH LAB;  Service: Peripheral Vascular;  Laterality: Left;    PERITONEAL CATHETER INSERTION      Permacath insertion  01/12/2017    PLACEMENT OF ARTERIOVENOUS GRAFT  12/28/2022    Procedure: INSERTION, GRAFT, ARTERIOVENOUS;  Surgeon: FARIDEH Muñoz III, MD;  Location: [facility] OR [facility] FLR;  Service: Peripheral Vascular;;    REMOVAL, GRAFT, ARTERIOVENOUS, UPPER EXTREMITY Left 01/25/2023    Procedure: REMOVAL, GRAFT, ARTERIOVENOUS, UPPER EXTREMITY;  Surgeon: FARIDEH Muñoz III, MD;  [facility];  Service: Peripheral Vascular;  Laterality: Left;    REVISION OF ARTERIOVENOUS FISTULA Left 05/01/2023    Procedure: REVISION, AV FISTULA;  Surgeon: FARIDEH Muñoz III, MD;  [facility];  Service: Peripheral Vascular;  Laterality: Left;  LUE AVG revision    REVISION OF PROCEDURE INVOLVING ARTERIOVENOUS GRAFT Left 12/28/2022    Procedure: REVISION, PROCEDURE INVOLVING ARTERIOVENOUS GRAFT;  Surgeon: FARIDEH Muñoz III, MD;  [facility];  Service: Peripheral Vascular;  Laterality: Left;    REVISION OF PROCEDURE INVOLVING ARTERIOVENOUS GRAFT Left 07/21/2023    Procedure: LEFT ARM ARTERIOVENOUS GRAFT EXCISION  AND LIGATION;  Surgeon: Obi Werner MD;  Location: [facility];  Service: Vascular;  Laterality: Left;  Left AVG excision and revision with interposition    SALPINGOOPHORECTOMY Right 2016    KJB---DAVINCI    TONSILLECTOMY      TUBAL LIGATION       Review of patient's allergies indicates:   Allergen Reactions    Carvedilol Other (See Comments)     Nausea/vomiting    Allopurinol      Other reaction(s): abnormal  transaminases       Current Facility-Administered Medications   Medication Dose Route Frequency Provider Last Rate Last Admin    acetaminophen tablet 650 mg  650 mg Oral Q6H PRN José Miguel Cristina MD        aspirin EC tablet 81 mg  81 mg Oral Daily José Miguel Cristina MD   81 mg at 04/14/24 0843    atorvastatin tablet 40 mg  40 mg Oral Daily José Miguel Cristina MD   40 mg at 04/14/24 0843    ceFEPIme (MAXIPIME) 1 g in dextrose 5 % in water (D5W) 100 mL IVPB (MB+)  1 g Intravenous Q24H José Miguel Cristina  mL/hr at 04/16/24 0853 1 g at 04/16/24 0853    dextrose 10% bolus 125 mL 125 mL  12.5 g Intravenous PRN José Miguel Cristina MD        dextrose 10% bolus 250 mL 250 mL  25 g Intravenous PRN José Miguel Cristina MD        famotidine tablet 20 mg  20 mg Oral Daily José Miguel Cristina MD   20 mg at 04/14/24 0843    glucagon (human recombinant) injection 1 mg  1 mg Intramuscular PRN José Miguel Cristina MD        glucose chewable tablet 16 g  16 g Oral PRN José Miguel Cristina MD        glucose chewable tablet 24 g  24 g Oral PRN José Miguel Cristina MD        levothyroxine tablet 75 mcg  75 mcg Oral Before breakfast José Miguel Cristina MD   75 mcg at 04/15/24 0611    metoprolol succinate (TOPROL-XL) 24 hr tablet 25 mg  25 mg Oral Daily José Miguel Cristina MD        midodrine tablet 10 mg  10 mg Oral TID José Miguel Cristina MD   10 mg at 04/15/24 2039    mupirocin 2 % ointment   Nasal BID José Miguel Cristina MD   Given at 04/15/24 2039    vancomycin - pharmacy to dose   Intravenous pharmacy to manage frequency José Miguel Cristina MD         Facility-Administered Medications Ordered in Other Encounters   Medication Dose Route Frequency Provider Last Rate Last Admin    0.9%  NaCl infusion   Intravenous Continuous Soni Bhatti MD   New Bag at 07/21/23 1116   ECho 3/23/      Show Result ComparisonThe left ventricle is normal in size with mild eccentric  hypertrophy and severely decreased systolic function.  The estimated ejection fraction is 23%.  There is severe left ventricular global hypokinesis.  Grade II left ventricular diastolic dysfunction.  Moderate left atrial enlargement.  Normal right ventricular size with normal right ventricular systolic function.  Moderate right atrial enlargement.  There is mild aortic valve stenosis.  Aortic valve area is 1.58 cm2; peak velocity is 2.01 m/s; mean gradient is 8 mmHg.  Moderate mitral regurgitation.  Moderate to severe tricuspid regurgitation.  Mild pulmonic regurgitation.  Elevated central venous pressure (15 mmHg).  The estimated PA systolic pressure is 47 mmHg.  There is mild-moderate pulmonary hypertension.  Moderate posterolateral pericardial effusion. Trivial under the RA    LABS    Recent Results (from the past 24 hour(s))   POCT glucose    Collection Time: 04/15/24  9:32 AM   Result Value Ref Range    POCT Glucose 185 (H) 70 - 110 mg/dL   ISTAT PROCEDURE    Collection Time: 04/15/24  9:46 AM   Result Value Ref Range    POC PH 7.365 7.35 - 7.45    POC PCO2 28.8 (LL) 35 - 45 mmHg    POC PO2 47 (LL) 80 - 100 mmHg    POC HCO3 16.4 (L) 24 - 28 mmol/L    POC BE -8 (L) -2 to 2 mmol/L    POC SATURATED O2 82 95 - 100 %    POC TCO2 17 (L) 23 - 27 mmol/L    Sample ARTERIAL     Site RR     Allens Test N/A     DelSys Room Air     Mode SPONT     FiO2 21    POCT glucose    Collection Time: 04/15/24 10:02 AM   Result Value Ref Range    POCT Glucose 170 (H) 70 - 110 mg/dL   Ammonia    Collection Time: 04/15/24 10:34 AM   Result Value Ref Range    Ammonia 36 10 - 50 umol/L   TSH    Collection Time: 04/15/24 10:34 AM   Result Value Ref Range    TSH 3.157 0.400 - 4.000 uIU/mL   Echo    Collection Time: 04/15/24  1:27 PM   Result Value Ref Range    BSA 1.47 m2    LVOT stroke volume 20.93 cm3    LVIDd 5.31 3.5 - 6.0 cm    LV Systolic Volume 111.89 mL    LV Systolic Volume Index 75.1 mL/m2    LVIDs 4.88 (A) 2.1 - 4.0 cm    LV  Diastolic Volume 136.17 mL    LV Diastolic Volume Index 91.39 mL/m2    IVS 1.33 (A) 0.6 - 1.1 cm    LVOT diameter 1.98 cm    LVOT area 3.1 cm2    FS 8 (A) 28 - 44 %    Left Ventricle Relative Wall Thickness 0.57 cm    Posterior Wall 1.52 (A) 0.6 - 1.1 cm    LV mass 328.09 g    LV Mass Index 220 g/m2    MV Peak E Subhash 1.02 m/s    TDI LATERAL 0.05 m/s    TDI SEPTAL 0.03 m/s    E/E' ratio 25.50 m/s    MV Peak A Subhash 1.10 m/s    TR Max Subhash 2.26 m/s    E/A ratio 0.93     IVRT 87.54 msec    E wave deceleration time 136.32 msec    LV SEPTAL E/E' RATIO 34.00 m/s    LV LATERAL E/E' RATIO 20.40 m/s    LVOT peak subhash 0.43 m/s    Left Ventricular Outflow Tract Mean Velocity 0.31 cm/s    Left Ventricular Outflow Tract Mean Gradient 0.43 mmHg    RVDD 4.26 cm    RV S' 11.56 cm/s    TAPSE 2.12 cm    LA size 3.54 cm    Left Atrium Minor Axis 5.91 cm    Left Atrium Major Axis 5.98 cm    RA Major Axis 5.96 cm    AV regurgitation pressure 1/2 time [id number] ms    AR Max Subhash 2.52 m/s    AV mean gradient 10 mmHg    AV peak gradient 17 mmHg    Ao peak subhash 2.04 m/s    Ao VTI 29.90 cm    LVOT peak VTI 6.80 cm    AV valve area 0.70 cm²    AV Velocity Ratio 0.21     AV index (prosthetic) 0.23     LYNNETTE by Velocity Ratio 0.65 cm²    MV mean gradient 3 mmHg    MV peak gradient 6 mmHg    MV stenosis pressure 1/2 time 39.53 ms    MV valve area p 1/2 method 5.57 cm2    MV valve area by continuity eq 0.73 cm2    MV VTI 28.6 cm    Triscuspid Valve Regurgitation Peak Gradient 20 mmHg    PV PEAK VELOCITY 0.93 m/s    PV peak gradient 3 mmHg    Sinus 3.35 cm    STJ 2.26 cm    Ascending aorta 3.21 cm    IVC diameter 2.30 cm    Mean e' 0.04 m/s    ZLVIDS 4.55     ZLVIDD 1.83     LA Volume Index 49.2 mL/m2    LA volume 73.34 cm3    LA WIDTH 4.1 cm    RA Width 5.4 cm   POCT glucose    Collection Time: 04/15/24  5:02 PM   Result Value Ref Range    POCT Glucose 73 70 - 110 mg/dL   POCT glucose    Collection Time: 04/15/24  5:18 PM   Result Value Ref  Range    POCT Glucose 87 70 - 110 mg/dL   POCT glucose    Collection Time: 04/15/24  8:37 PM   Result Value Ref Range    POCT Glucose 75 70 - 110 mg/dL   POCT glucose    Collection Time: 04/16/24 12:06 AM   Result Value Ref Range    POCT Glucose 77 70 - 110 mg/dL   POCT glucose    Collection Time: 04/16/24  3:09 AM   Result Value Ref Range    POCT Glucose 74 70 - 110 mg/dL   Vancomycin, Random    Collection Time: 04/16/24  4:01 AM   Result Value Ref Range    Vancomycin, Random 20.2 Not established ug/mL   CBC Auto Differential    Collection Time: 04/16/24  4:01 AM   Result Value Ref Range    WBC 5.69 3.90 - 12.70 K/uL    RBC 3.90 (L) 4.00 - 5.40 M/uL    Hemoglobin 11.2 (L) 12.0 - 16.0 g/dL    Hematocrit 36.4 (L) 37.0 - 48.5 %    MCV 93 82 - 98 fL    MCH 28.7 27.0 - 31.0 pg    MCHC 30.8 (L) 32.0 - 36.0 g/dL    RDW 21.5 (H) 11.5 - 14.5 %    Platelets 172 150 - 450 K/uL    MPV 10.8 9.2 - 12.9 fL    Immature Granulocytes 0.4 0.0 - 0.5 %    Gran # (ANC) 3.9 1.8 - 7.7 K/uL    Immature Grans (Abs) 0.02 0.00 - 0.04 K/uL    Lymph # 0.8 (L) 1.0 - 4.8 K/uL    Mono # 0.6 0.3 - 1.0 K/uL    Eos # 0.2 0.0 - 0.5 K/uL    Baso # 0.09 0.00 - 0.20 K/uL    nRBC 1 (A) 0 /100 WBC    Gran % 69.3 38.0 - 73.0 %    Lymph % 14.2 (L) 18.0 - 48.0 %    Mono % 11.2 4.0 - 15.0 %    Eosinophil % 3.3 0.0 - 8.0 %    Basophil % 1.6 0.0 - 1.9 %    Differential Method Automated    Basic Metabolic Panel    Collection Time: 04/16/24  4:01 AM   Result Value Ref Range    Sodium 136 136 - 145 mmol/L    Potassium 3.6 3.5 - 5.1 mmol/L    Chloride 99 95 - 110 mmol/L    CO2 23 23 - 29 mmol/L    Glucose 71 70 - 110 mg/dL    BUN 26 (H) 6 - 20 mg/dL    Creatinine 4.2 (H) 0.5 - 1.4 mg/dL    Calcium 11.5 (H) 8.7 - 10.5 mg/dL    Anion Gap 14 8 - 16 mmol/L    eGFR 12 (A) >60 mL/min/1.73 m^2   POCT glucose    Collection Time: 04/16/24  8:20 AM   Result Value Ref Range    POCT Glucose 79 70 - 110 mg/dL   Ammonia    Collection Time: 04/16/24  8:28 AM   Result Value Ref  "Range    Ammonia 29 10 - 50 umol/L   ]    I/O last 3 completed shifts:  In: 245.9 [P.O.:150; IV Piggyback:95.9]  Out: 500 [Other:500]    Vitals:    04/16/24 0500 04/16/24 0600 04/16/24 0800 04/16/24 0858   BP: 136/74 131/87 124/72    Pulse: 89 90 89 96   Resp: 19 (!) 24 (!) 25 (!) 25   Temp:       TempSrc:       SpO2: 95% (!) 92% 95% 97%   Weight:       Height:           Pos Jvd, No Thyromegaly or Lymphadenopathy  Lungs: Fairly clear anteriorly and laterally  Cor: RRR no G or rubs  Abd: Soft benign good bowel sounds non tender  Ext: No E C C    A)    Difficult situation esrd pt with terrible card fx and AMS also with lactic acidosis and lowish bp with worsening vitals signs   ESRD x 8 y  Hypoglycemia ( in esrd patient might be an ominous sign)   Anemia   Hx of cva ? New CVA  Hx of prediabetes  Hypercalcemia ( mild (<12)     P)    Renal Diet when able   Home meds when able  Protect access  HD might need hd low intensity today  EPO prn   Binders prn   Adjust all meds to the degree of renal fx  Close follow up I/O and weights  Maintain Hydration     Tx for hypercalcemia some times include IVF, biphosphonate and calcitonin. Of those 3 calcitonin is most "kidney friendly patient". The casue for Hypercalcemia might be related to her cancer        "

## 2024-04-16 NOTE — PT/OT/SLP EVAL
Physical Therapy Evaluation    Patient Name:  Eva Bloom   MRN:  [id number]    Recommendations:     Discharge Recommendations: Moderate Intensity Therapy   Discharge Equipment Recommendations: none   Barriers to discharge home:  Pt with decreased safety awareness, increased fall risks, and decreased functional mobility/ambulation at this time.     Assessment:     Eva Bloom is a 58 y.o. female admitted with a medical diagnosis of Sepsis.  She presents with the following impairments/functional limitations: weakness, impaired endurance, impaired self care skills, impaired functional mobility, gait instability, impaired balance, impaired cognition, decreased coordination, decreased upper extremity function, decreased lower extremity function, decreased safety awareness, abnormal tone, decreased ROM, impaired fine motor, impaired cardiopulmonary response to activity.    Rehab Prognosis: Fair; patient would benefit from acute skilled PT services to address these deficits and reach maximum level of function.    Recent Surgery: * No surgery found *      Plan:     During this hospitalization, patient to be seen 5 x/week to address the identified rehab impairments via gait training, therapeutic activities, therapeutic exercises, wheelchair management/training and progress toward the following goals:    Plan of Care Expires:  04/30/24    Subjective     Chief Complaint: N/A  Patient/Family Comments/goals: Spouse reported pt only slightly doing better today, stated pt was much mobile on the floor ambulating to the bathroom with nursing prior to ICU transfer.    Pain/Comfort:  Pain Rating 1: 0/10      Living Environment:  Pt lives with spouse in a [facility] with no concerns at entry.   Prior to admission, patients level of function was mod I with household ambulation using RW.  Spouse was transporting pt to dialysis.  Equipment used at home: walker, rolling, w/c, BSC, and shower chair.  Upon discharge, patient will have  assistance from family.  Pt has 2 sons, 1 is out of state.      Objective:     Communicated with ICU nurse Tatiana prior to session.  Patient found left sidelying with blood pressure cuff, pulse ox (continuous), telemetry, peripheral IV, bed alarm (L chest HD access), and SCD upon PT entry to room.    General Precautions: Standard, fall (ESRD on HD)  Orthopedic Precautions:N/A   Braces:  (Per spouse, pt has 2 post-op shoes at home.)  Respiratory Status: Room air    Exams:  Cognitive Exam:  Patient was able to follow some simple commands.   Gross Motor Coordination:  impaired  Postural Exam:  Patient presented with the following abnormalities:    -       No postural abnormalities identified  Sensation: unable to formally assess 2* AMS  Skin Integrity/Edema:      -       Skin integrity: Pt has mepelix foam dressings to R anterior-lateral thigh and L heel.  Per spouse, pt was receiving [facility] outpatient wound care services with Dr. Jones.  Spouse reported that wounds are healing, no restriction with ambulation and does have B post-op shoes at home.   -       Edema: None noted BLE  BLE ROM: PROM WFL  BLE Strength: unable to formally assess 2* AMS    Functional Mobility: V/S HR 83 bpm, /87, spO2 on RA 90%? (Nurse notified).  Pt with confusion and limited verbal communication, spouse present and answering all the questions.  Pt with limited active participation.  PT will continue to assess.   Bed Mobility:     Rolling Right: maximal assistance and of 2 persons  Scooting: maximal assistance and of 2 persons  Bridging: dependence and of 2 persons to reposition HOB   Supine to Sit: maximal assistance and of 2 persons  Sit to Supine: maximal assistance and of 2 persons  Transfers:     Sit to Stand: maximal assistance and of 2 persons with no AD x2 trials   Gait: Pt ambulated ~4 small sidesteps along bedside with max A of 2 persons using no AD.  Pt with forward flexed trunk, decreased weight shifting, B knee buckling,  decreased foot clearance, decreased step length, and decreased zbigniew.   Balance: Pt with fair- static sit balance and poor static stand balance.  Pt with mild body jerks while sitting EOB.       AM-PAC 6 CLICK MOBILITY  Total Score:11       Treatment & Education:  PROM of BLE in sitting in all available planes: hip flex, knee flex/ext, and ankle DF/PF    Attempted active LE therex sitting EOB, however pt unable to follow simple commands to participate despite max VC's/TC's/and demonstration.     Pt/spouse educated on acute skilled PT services and goals.  Spouse verbalized good understanding.     Patient left HOB elevated with all lines intact, call button in reach, bed alarm on, ICU nurse Tatiana notified, and spouse present.    GOALS:   Multidisciplinary Problems       Physical Therapy Goals          Problem: Physical Therapy    Goal Priority Disciplines Outcome Goal Variances Interventions   Physical Therapy Goal     PT, PT/OT Ongoing, Progressing     Description: Goals to be met by: 24     Patient will increase functional independence with mobility by performin. Supine to sit with Minimal Assistance  2. Rolling to Left and Right with Minimal Assistance  3. Sit to stand transfer with Minimal Assistance using RW  4. Bed to chair transfer with Minimal Assistance using Rolling Walker  5. Gait x50 feet with Minimal Assistance using Rolling Walker   6. Wheelchair propulsion x100 feet with Minimal Assistance using bilateral upper extremities  7. Lower extremity exercise program 2 sets x10 reps per handout, with independence                         History:     Past Medical History:   Diagnosis Date    Anemia     Anticoagulant long-term use     Atrial fibrillation     Bronchitis 2017    Cancer 2016    kidney cancer    CHF (congestive heart failure), NYHA class II, chronic, systolic     CMV (cytomegalovirus) antibody positive     CVA (cerebral vascular accident) 1/3/2024    Encounter for blood  transfusion     ESRD (end stage renal disease)     Essential hypertension 09/23/2015    H/O herpes simplex type 2 infection     Herpes simplex type 1 antibody positive     History of kidney cancer     s/p left nephrectomy 1/2016    Hyperparathyroidism, unspecified     Hyperuricemia without signs of inflammatory arthritis and tophaceous disease     Kidney stones     LGSIL (low grade squamous intraepithelial dysplasia)     Myocardiopathy 07/21/2017    Prediabetes     Proteinuria     Renal disorder     Thyroid nodule     Urate nephropathy        Past Surgical History:   Procedure Laterality Date    BREAST CYST EXCISION      COLONOSCOPY N/A 11/12/2015    COLONOSCOPY N/A 03/12/2021    Procedure: COLONOSCOPY;  Surgeon: Brendon Lanier MD;  Location: [facility] ENDO;  Service: Endoscopy;  Laterality: N/A;  covid test 3/9, labs prior, prep instr mailed -ml    EXCISION OF ARTERIOVENOUS FISTULA Left 09/27/2023    Procedure: EXCISION, AV FISTULA;  Surgeon: FARIDEH Muñoz III, MD;  Location: [facility] OR [address] [place] [address];  Service: Vascular;  Laterality: Left;  LUE AV graft excision    INSERTION OF BIVENTRICULAR IMPLANTABLE CARDIOVERTER-DEFIBRILLATOR (ICD)  04/2021    INSERTION OF TUNNELED CENTRAL VENOUS CATHETER (CVC) WITH SUBCUTANEOUS PORT N/A 01/25/2023    Procedure: INSERTION, DUAL LUMEN CATHETER WITH PORT, WITH IMAGING GUIDANCE;  Surgeon: FARIDEH Muñoz III, MD;  [facility];  Service: Peripheral Vascular;  Laterality: N/A;  possinble permcath placment    NEPHRECTOMY-LAPAROSCOPIC Left 01/12/2016    PERCUTANEOUS TRANSLUMINAL ANGIOPLASTY OF ARTERIOVENOUS FISTULA Left 04/19/2023    Procedure: PTA, AV FISTULA;  Surgeon: FARIDEH Muñoz III, MD;  Location: [facility] CATH LAB;  Service: Peripheral Vascular;  Laterality: Left;    PERITONEAL CATHETER INSERTION      Permacath insertion  01/12/2017    PLACEMENT OF ARTERIOVENOUS GRAFT  12/28/2022    Procedure: INSERTION, GRAFT, ARTERIOVENOUS;  Surgeon: FARIDEH Muñoz III, MD;  Location:  [facility] OR 2ND FLR;  Service: Peripheral Vascular;;    REMOVAL, GRAFT, ARTERIOVENOUS, UPPER EXTREMITY Left 01/25/2023    Procedure: REMOVAL, GRAFT, ARTERIOVENOUS, UPPER EXTREMITY;  Surgeon: FARIDEH Muñoz III, MD;  [facility];  Service: Peripheral Vascular;  Laterality: Left;    REVISION OF ARTERIOVENOUS FISTULA Left 05/01/2023    Procedure: REVISION, AV FISTULA;  Surgeon: FARIDEH Muñoz III, MD;  [facility];  Service: Peripheral Vascular;  Laterality: Left;  LUE AVG revision    REVISION OF PROCEDURE INVOLVING ARTERIOVENOUS GRAFT Left 12/28/2022    Procedure: REVISION, PROCEDURE INVOLVING ARTERIOVENOUS GRAFT;  Surgeon: FARIDEH Muñoz III, MD;  [facility];  Service: Peripheral Vascular;  Laterality: Left;    REVISION OF PROCEDURE INVOLVING ARTERIOVENOUS GRAFT Left 07/21/2023    Procedure: LEFT ARM ARTERIOVENOUS GRAFT EXCISION  AND LIGATION;  Surgeon: Obi Werner MD;  Location: [facility];  Service: Vascular;  Laterality: Left;  Left AVG excision and revision with interposition    SALPINGOOPHORECTOMY Right 2016    KJB---DAVINCI    TONSILLECTOMY      TUBAL LIGATION         Time Tracking:     PT Received On: 04/16/24  PT Start Time: 1440     PT Stop Time: 1507  PT Total Time (min): 27 min     Billable Minutes: Evaluation 17 min co-eval with OT and Therapeutic Exercise 10 min      04/16/2024

## 2024-04-16 NOTE — ASSESSMENT & PLAN NOTE
Patient is lethargic today,code stroke was called,transfered to ICU,head CT show no acute process,evaluated by Tele neuro,will have CTA head,check ammonia and TSH,hypoglycemia is resolved,will monitor closely..imagines and labs show no acute process,acute metabolic encephalopathy  seems improving,consulted neurology,ordered EEG.

## 2024-04-16 NOTE — TELEPHONE ENCOUNTER
----- Message from Pallavi Puentes sent at 4/16/2024  8:19 AM CDT -----  Regarding: Otis ley [phone]  .Type: Patient Call Back    Who called:Otis wife    What is the request in detail: Otis requesting a call back from MD or Nurse. He wanted to notify office patient is in the ICU at [facility].    Can the clinic reply by ALESSANDRASLOUIE?no    Would the patient rather a call back or a response via [facility]?call back    Best call back number [phone]

## 2024-04-16 NOTE — PT/OT/SLP EVAL
Occupational Therapy Evaluation and Treatment    Name: Eva Bloom  MRN: [id number]  Admitting Diagnosis: Sepsis  Recent Surgery: * No surgery found *      Recommendations:     Discharge Recommendations: Moderate Intensity Therapy  Level of Assistance Recommended: 24 hours significant assistance  Discharge Equipment Recommendations: none  Barriers to discharge:  (generalized weakness, not at the functional baseline)    Assessment:     Eva Bloom is a 58 y.o. female with a medical diagnosis of Sepsis. She presents with performance deficits affecting function including weakness, impaired endurance, impaired self care skills, impaired functional mobility, gait instability, impaired balance, decreased coordination, decreased upper extremity function, decreased lower extremity function, decreased safety awareness, impaired fine motor, impaired cognition, impaired skin, impaired cardiopulmonary response to activity. The patient participated in OT eval with her spouse present.  The patient required max assist for overall functional mobility. The patient required CGA<>min assist for static sitting balance on the EOB. The patient followed some simple commands and required VC/tactile cues to remain alert. The patient's vital signs were stable during OT eval.    Rehab Prognosis: Good and Fair; patient would benefit from acute OT services to address these deficits and reach maximum level of function.    Plan:     Patient to be seen  (3-5x/week) to address the above listed problems via self-care/home management, therapeutic activities, therapeutic exercises  Plan of Care Expires: 04/30/24  Plan of Care Reviewed with: patient, spouse    Subjective     Chief Complaint: none  Patient Comments/Goals: unable to state, patient with only a few verbalizations  Pain/Comfort:  Pain Rating 1: 0/10    Patients cultural, spiritual, Scientologist conflicts given the current situation: no    Social History:  Living Environment: Patient  "lives with their spouse in a single story home with number of outside stair(s): 1  Prior Level of Function: Prior to admission, patient  amb using a RW to the bathroom. The patient has received assist to bathe and dress from her spouse since Jan. 2024.  Uses a W/C for long distances.  Roles and Routines: Patient went to HD T,T,S with transportation from her spouse prior to admission. The patient was receiving HH nursing for wound care to the left foot and HH OT.  Equipment Used at Home: walker, rolling, bedside commode, wheelchair, shower chair  DME owned (not currently used): none  Assistance Upon Discharge:  spouse, has 3 sons    Objective:     Communicated with nurseTatiana prior to session. Patient found left sidelying with HOB elevated with bed alarm, oxygen, peripheral IV, pulse ox (continuous), telemetry upon OT entry to room.    General Precautions: Standard, aspiration   Orthopedic Precautions: N/A   Braces: N/A    Respiratory Status: Room air    Occupational Performance    Gait belt applied - No    Bed Mobility:   Scooting to HOB in supine: total assistance, of 2 persons, and with drawsheet  Scooting anteriorly to EOB to have both feet planted on floor: maximal assistance  Supine to sit from right side of bed with maximal assistance  Sit to Supine with maximal assistance on right side of bed    Functional Mobility/Transfers:  Sit <> Stand Transfer with maximal assistance and 2 persons with hand-held assist  Functional Mobility: The patient stood x2 trials with max assist x2 persons. The patient stood briefly on 1st attempt and sat suddenly. On 2nd trial, the patient required max assist to take a few side steps    Activities of Daily Living:  Grooming: maximal assistance  Upper Body Dressing: maximal assistance and dependence  Lower Body Dressing: dependence    Cognitive/Visual Perceptual:  Cognitive/Psychosocial Skills:    -     Oriented to: Person and said "yes" to her name, Eva, stated LOPEZ  -     " "Follows Commands/attention: Easily distracted and Follows one-step commands  -     Communication: made a few comments "yes" to her name and "[birth year] her birthday  -     Memory: Impaired STM, Impaired LTM, and Poor immediate recall  -     Safety awareness/insight to disability: impaired  -     Mood/Affect/Coping skills/emotional control: appeared sleepy  Visual/Perceptual:    -     able to make eye contact with verbal cues    Physical Exam:  Balance:    -     Sitting: contact guard assistance and minimum assistance  -     Standing: maximal assistance and of 2 persons  Postural examination/scapula alignment:    -       Rounded shoulders  -       Forward head  -       forward trunk  Skin integrity: wound to left foot  Edema:  None noted  Sensation: unable to assess   Dominant hand: Right  Upper Extremity Range of Motion:     -       Right Upper Extremity: WFL  -       Left Upper Extremity: WFL  Upper Extremity Strength: generalized weakness BUE   Strength: B weakness  Fine Motor Coordination:    -       Impaired        AMPAC 6 Click ADL:  AMPAC Total Score: 12    Treatment & Education:  Patient educated on role of OT, POC, and goals for therapy  Performed self care and functional mobility as noted above      Patient left HOB elevated with all lines intact, call button in reach, and spouse present.    GOALS:   Multidisciplinary Problems       Occupational Therapy Goals          Problem: Occupational Therapy    Goal Priority Disciplines Outcome Interventions   Occupational Therapy Goal     OT, PT/OT Ongoing, Progressing    Description: Goals to be met by: 4/30/2024     Patient will increase functional independence with ADLs by performing:    Feeding with Modified [place] and Supervision.  UE Dressing with Contact Guard Assistance.  LE Dressing with Stand-by Assistance.  Grooming while seated with Set-up Assistance and Supervision.  Toileting from bedside commode with Set-up Assistance, Stand-by Assistance, " and Assistive Devices as needed for hygiene and clothing management.   Sitting at edge of bed x30 minutes with Modified [place] and Supervision.  Rolling to Bilateral with Modified [place] and Supervision.   Supine to sit with Contact Guard Assistance.  Step transfer with Contact Guard Assistance  Toilet transfer to bedside commode with Contact Guard Assistance.  Upper extremity exercise program x10 reps per handout, with assistance as needed.                         History:     Past Medical History:   Diagnosis Date    Anemia     Anticoagulant long-term use     Atrial fibrillation     Bronchitis 03/01/2017    Cancer 2016    kidney cancer    CHF (congestive heart failure), NYHA class II, chronic, systolic     CMV (cytomegalovirus) antibody positive     CVA (cerebral vascular accident) 1/3/2024    Encounter for blood transfusion     ESRD (end stage renal disease)     Essential hypertension 09/23/2015    H/O herpes simplex type 2 infection     Herpes simplex type 1 antibody positive     History of kidney cancer     s/p left nephrectomy 1/2016    Hyperparathyroidism, unspecified     Hyperuricemia without signs of inflammatory arthritis and tophaceous disease     Kidney stones     LGSIL (low grade squamous intraepithelial dysplasia)     Myocardiopathy 07/21/2017    Prediabetes     Proteinuria     Renal disorder     Thyroid nodule     Urate nephropathy          Past Surgical History:   Procedure Laterality Date    BREAST CYST EXCISION      COLONOSCOPY N/A 11/12/2015    COLONOSCOPY N/A 03/12/2021    Procedure: COLONOSCOPY;  Surgeon: Brendon Lanier MD;  Location: [facility];  Service: Endoscopy;  Laterality: N/A;  covid test 3/9, labs prior, prep instr mailed -ml    EXCISION OF ARTERIOVENOUS FISTULA Left 09/27/2023    Procedure: EXCISION, AV FISTULA;  Surgeon: FARIDEH Muñoz III, MD;  Location: [facility] OR [address] [place] [address];  Service: Vascular;  Laterality: Left;  LUE AV graft excision    INSERTION OF BIVENTRICULAR  IMPLANTABLE CARDIOVERTER-DEFIBRILLATOR (ICD)  04/2021    INSERTION OF TUNNELED CENTRAL VENOUS CATHETER (CVC) WITH SUBCUTANEOUS PORT N/A 01/25/2023    Procedure: INSERTION, DUAL LUMEN CATHETER WITH PORT, WITH IMAGING GUIDANCE;  Surgeon: FARIDEH Muñoz III, MD;  Location: [facility] OR [address] [place] [address];  Service: Peripheral Vascular;  Laterality: N/A;  possinble permcath placment    NEPHRECTOMY-LAPAROSCOPIC Left 01/12/2016    PERCUTANEOUS TRANSLUMINAL ANGIOPLASTY OF ARTERIOVENOUS FISTULA Left 04/19/2023    Procedure: PTA, AV FISTULA;  Surgeon: FARIDEH Muñoz III, MD;  Location: [facility] CATH LAB;  Service: Peripheral Vascular;  Laterality: Left;    PERITONEAL CATHETER INSERTION      Permacath insertion  01/12/2017    PLACEMENT OF ARTERIOVENOUS GRAFT  12/28/2022    Procedure: INSERTION, GRAFT, ARTERIOVENOUS;  Surgeon: FARIDEH Muñoz III, MD;  [facility];  Service: Peripheral Vascular;;    REMOVAL, GRAFT, ARTERIOVENOUS, UPPER EXTREMITY Left 01/25/2023    Procedure: REMOVAL, GRAFT, ARTERIOVENOUS, UPPER EXTREMITY;  Surgeon: FARIDEH Muñoz III, MD;  [facility];  Service: Peripheral Vascular;  Laterality: Left;    REVISION OF ARTERIOVENOUS FISTULA Left 05/01/2023    Procedure: REVISION, AV FISTULA;  Surgeon: FARIDEH Muñoz III, MD;  [facility];  Service: Peripheral Vascular;  Laterality: Left;  LUE AVG revision    REVISION OF PROCEDURE INVOLVING ARTERIOVENOUS GRAFT Left 12/28/2022    Procedure: REVISION, PROCEDURE INVOLVING ARTERIOVENOUS GRAFT;  Surgeon: FARIDEH Muñoz III, MD;  [facility];  Service: Peripheral Vascular;  Laterality: Left;    REVISION OF PROCEDURE INVOLVING ARTERIOVENOUS GRAFT Left 07/21/2023    Procedure: LEFT ARM ARTERIOVENOUS GRAFT EXCISION  AND LIGATION;  Surgeon: Obi Werner MD;  Location: [facility] OR;  Service: Vascular;  Laterality: Left;  Left AVG excision and revision with interposition    SALPINGOOPHORECTOMY Right 2016    KJB---GENE     TONSILLECTOMY      TUBAL LIGATION         Time Tracking:     OT Date of Treatment: 04/16/24  OT Start Time: 1439  OT Stop Time: 1505  OT Total Time (min): 26 min    Billable Minutes: Evaluation 16 (with PT) and Self Care/Home Management 10    4/16/2024

## 2024-04-16 NOTE — PROCEDURES
DATE: 4/16/24    EEG NUMBER: OW     REFERRING PHYSICIAN:  Dr. Cristina     This EEG was performed to assess for subclinical seizures      ELECTROENCEPHALOGRAM REPORT     METHODOLOGY:  Electroencephalographic (EEG) recording is with electrodes placed according to the International 10-20 placement system.  Thirty two (32) channels of digital signal are simultaneously recorded from the scalp and may include EKG, EMG, and/or eye monitors.   Recording band pass was 0.1 to 512 hz.  Digital video recording of the patient is simultaneously recorded with the EEG.  The nursing staff report clinical symptoms and may press an event button when the patient has symptoms of clinical interest to the treating physicians.  EEG and video recording is stored and archived in digital format.  The entire recording is visually reviewed, and the times identified by computer analysis as being spikes or seizures are reviewed again.  Activation procedures which include photic stimulation, hyperventilation and instructing patients to perform simple task are done in selected patients.   Compresses spectral analysis (CSA) is also performed on the activity recorded from each individual channel.  This is displayed as a power display of frequencies from 0 to 30 Hz over time.   The CSA analysis is done and displayed continuously.  This is reviewed for asymmetries in power between homologous areas of the scalp and for presence of changes in power which can be seen when seizures occur.  Sections of suspected abnormalities on the CSA is then compared with the original EEG recording.                [employer] software was also utilized in the review of this study.  This software suite analyzes the EEG recording in multiple domains.  Coherence and rhythmicity is computed to identify EEG sections which may contain organized seizures.  Each channel undergoes analysis to detect presence of spike and sharp waves which have special and morphological  characteristic of epileptic activity.  The routine EEG recording is converted from spacial into frequency domain.  This is then displayed comparing homologous areas to identify areas of significant asymmetry.  Algorithm to identify non-cortically generated artifact is used to separate eye movement, EMG and other artifact from the EEG.     EEG FINDINGS:  The recording was obtained with a number of standard bipolar and referential montages during wakefulness, drowsiness.  In the alert state, the posterior background rhythm was a symmetric, well-modulated 6-7 Hz activity, which reacted symmetrically to eye opening.  Activation procedures were not performed.  Frequent triphasic waves ranging from 1-2 hertz were noted throughout the study.  During drowsiness, the background rhythm waxed and waned and there were periods of slowing.   There were no interictal epileptiform abnormalities and no clinical or electrographic seizures were recorded.    The EKG channel revealed a sinus rhythm.     IMPRESSION:  This is an abnormal EEG during wakefulness, drowsiness .  Frequent triphasic waves were noted      CLINICAL CORRELATION:  Ms. Bloom is a 58 year old female, with medical history of Afib, CHF NYHA class II, chronic, systolic, CVA, ESRD on dialysis (Tues, Thurs, Sat), HTN, Hx of kidney cancer, hyperparathyroidism, myocardiopathy, and prediabetes with admission concerns of sepsis.  This is an abnormal EEG during wakefulness and drowsiness.  The overall degree of slowing and disorganization along with frequent triphasic waves is suggestive of a generalized metabolic encephalopathy.  There is no evidence of an epileptic process on this recording.  No seizures recorded during this study.

## 2024-04-16 NOTE — PROGRESS NOTES
[facility] Medicine  Progress Note    Patient Name: Eva Bloom  MRN: [id number]  Patient Class: IP- Inpatient   Admission Date: 4/13/2024  Length of Stay: 3 days  Attending Physician: José Miguel Cristina, *  Primary Care Provider: Sowmya Mccain MD        Subjective:     Principal Problem:Sepsis        HPI:  This is a 58 year old female, with a Anemia, Atrial fibrillation, CHF NYHA class II, chronic, systolic, CVA, ESRD on dialysis (Tues, Thurs, Sat) with completion of session today, Essential HTN, Hx of kidney cancer, Hyperparathyroidism, Myocardiopathy, and Prediabetes, who presents to the ED complaining of Palpitations, symptoms onset PTA. Patient reports cough, sleep disturbance, and generalized pruritus. Patient states she was at dialysis PTA and receiving dialysis when her heart rate jumped into the 120's and the dialysis nurses instructed her to present to the ER. Patient also states she has been on dialysis for 8 years and that she does not make urine. Patient further states she is on 2.5 mg of Eliquis. Additional history obtained from independent historian: patient's  states that in July last year she had a spot on her left foot and had a line inserted. Patient denies Hx of irregular heart rate or MI. Patient also denies rhinorrhea, sore throat, abdominal pain, emesis, diarrhea, or other associated symptoms. No other alleviating or exacerbating factors. This is the extent of the patient's complaints in the ED.patient is febrile in .8,tachycardic 120.sepsis likely  duo to l;ine infection,patient has been started on broad  spectrum IV Abx,blood culture is pending.    Overview/Hospital Course:  This is a 58 year old female, with a Anemia, Atrial fibrillation, CHF NYHA class II, chronic, systolic, CVA, ESRD on dialysis (Tues, Thurs, Sat) with completion of session today, Essential HTN, Hx of kidney cancer, Hyperparathyroidism, Myocardiopathy, and Prediabetes, who  presents to the ED complaining of Palpitations, symptoms onset PTA. Patient reports cough, sleep disturbance, and generalized pruritus. Patient states she was at dialysis PTA and receiving dialysis when her heart rate jumped into the 120's and the dialysis nurses instructed her to present to the ER. .patient is febrile in .8,tachycardic 120.sepsis likely  duo to l;ine infection,patient has been started on broad  spectrum IV Abx,blood culture is pending.HR and BP is improved at this time.cultures sofar negative.  Patient is lethargic today,code stroke was called,transfered to ICU,head CT show no acute process,evaluated by Tele neuro,will have CTA head,check ammonia and TSH,hypoglycemia is resolved,will monitor closely.imagines and labs show no acute process,acute metabolic encephalopathy  seems improving,consulted neurology,ordered EEG.    Interval History: .imagines and labs show no acute process,acute metabolic encephalopathy  seems improving,consulted neurology,ordered EEG.    Review of Systems   Constitutional:  Positive for activity change and appetite change.   Respiratory:  Negative for apnea and chest tightness.    Gastrointestinal:  Negative for abdominal distention and abdominal pain.   Endocrine: Negative for cold intolerance and heat intolerance.   Genitourinary:  Negative for difficulty urinating and dyspareunia.   Neurological:  Positive for weakness.   Psychiatric/Behavioral:  Positive for confusion.      Objective:     Vital Signs (Most Recent):  Temp: 97 °F (36.1 °C) (04/16/24 0400)  Pulse: 96 (04/16/24 0858)  Resp: (!) 25 (04/16/24 0858)  BP: 124/72 (04/16/24 0800)  SpO2: 97 % (04/16/24 0858) Vital Signs (24h Range):  Temp:  [97 °F (36.1 °C)-97.9 °F (36.6 °C)] 97 °F (36.1 °C)  Pulse:  [77-96] 96  Resp:  [13-37] 25  SpO2:  [84 %-100 %] 97 %  BP: (103-148)/(64-93) 124/72     Weight: 47.6 kg (104 lb 14.4 oz)  Body mass index is 18.01 kg/m².    Intake/Output Summary (Last 24 hours) at 4/16/2024  "1031  Last data filed at 4/15/2024 2100  Gross per 24 hour   Intake 125.9 ml   Output 500 ml   Net -374.1 ml         Physical Exam  HENT:      Left Ear: There is no impacted cerumen.   Cardiovascular:      Rate and Rhythm: Normal rate and regular rhythm.   Pulmonary:      Effort: No respiratory distress.      Breath sounds: No wheezing.   Abdominal:      General: There is no distension.   Musculoskeletal:         General: No swelling.   Neurological:      Mental Status: She is alert. She is disoriented.             Significant Labs: All pertinent labs within the past 24 hours have been reviewed.    Significant Imaging: I have reviewed all pertinent imaging results/findings within the past 24 hours.    Assessment/Plan:      * Sepsis  This patient does have evidence of infective focus  My overall impression is sepsis.  Source:  line infection   Antibiotics given-   Antibiotics (72h ago, onward)      Start     Stop Route Frequency Ordered    04/13/24 2100  mupirocin 2 % ointment         04/18/24 2059 Nasl 2 times daily 04/13/24 1319    04/13/24 2000  ceFEPIme (MAXIPIME) 1 g in dextrose 5 % in water (D5W) 100 mL IVPB (MB+)         -- IV Every 12 hours (non-standard times) 04/13/24 1330    04/13/24 1217  vancomycin - pharmacy to dose  (vancomycin IVPB (PEDS and ADULTS))        Placed in "And" Linked Group    -- IV pharmacy to manage frequency 04/13/24 1117          Latest lactate reviewed-  Recent Labs   Lab 04/13/24  1107 04/13/24  1423   LACTATE  --  2.8*   POCLAC 3.67*  --        Organ dysfunction indicated by Acute kidney injury    Fluid challenge Ideal Body Weight- The patient's ideal body weight is Ideal body weight: 54.7 kg (120 lb 9.5 oz) which will be used to calculate fluid bolus of 30 ml/kg for treatment of septic shock.      Post- resuscitation assessment Yes Perfusion exam was performed within 6 hours of septic shock presentation after bolus shows Adequate tissue perfusion assessed by non-invasive monitoring "       Will Not start Pressors- Levophed for MAP of 65  Source control achieved by: IV Abx.  HR and BP is improved at this time.cultures sofar negative.    Acute metabolic encephalopathy    Patient is lethargic today,code stroke was called,transfered to ICU,head CT show no acute process,evaluated by Tele neuro,will have CTA head,check ammonia and TSH,hypoglycemia is resolved,will monitor closely..imagines and labs show no acute process,acute metabolic encephalopathy  seems improving,consulted neurology,ordered EEG.    Somnolence  As above.      PAD (peripheral artery disease)  On  medical treatments,wound care .      Severe protein-calorie malnutrition  Nutrition consulted. Most recent weight and BMI monitored-     Measurements:  Wt Readings from Last 1 Encounters:   04/13/24 46.3 kg (102 lb)   Body mass index is 17.51 kg/m².    Patient has been screened and assessed by RD.    Malnutrition Type:  Context:    Level:      Malnutrition Characteristic Summary:       Interventions/Recommendations (treatment strategy):         Hypothyroidism  On synthroid.      PAF (paroxysmal atrial fibrillation)   On OAC and BB.    ICD (implantable cardioverter-defibrillator) in place - SubQ  Will monitor.      Chronic combined systolic and diastolic congestive heart failure  Patient is identified as having Combined Systolic and Diastolic heart failure that is Chronic. CHF is currently controlled. Latest ECHO performed and demonstrates- Results for orders placed during the hospital encounter of 05/08/23    Echo Saline Bubble? No    Interpretation Summary  · The left ventricle is normal in size with mild eccentric hypertrophy and severely decreased systolic function.  · The estimated ejection fraction is 23%.  · There is severe left ventricular global hypokinesis.  · Grade II left ventricular diastolic dysfunction.  · Moderate left atrial enlargement.  · Normal right ventricular size with normal right ventricular systolic function.  ·  Moderate right atrial enlargement.  · There is mild aortic valve stenosis.  · Aortic valve area is 1.58 cm2; peak velocity is 2.01 m/s; mean gradient is 8 mmHg.  · Moderate mitral regurgitation.  · Moderate to severe tricuspid regurgitation.  · Mild pulmonic regurgitation.  · Elevated central venous pressure (15 mmHg).  · The estimated PA systolic pressure is 47 mmHg.  · There is mild-moderate pulmonary hypertension.  · Moderate posterolateral pericardial effusion. Trivial under the RA  . Continue Beta Blocker and monitor clinical status closely. Monitor on telemetry. Patient is off CHF pathway.  Monitor strict Is&Os and daily weights.  Place on fluid restriction of 1.5 L. Cardiology has not been consulted. Continue to stress to patient importance of self efficacy and  on diet for CHF. Last BNP reviewed- and noted below   Recent Labs   Lab 04/13/24  1104   BNP >4,900*       Pulmonary hypertension  Fluid removal with HD.      Essential hypertension  On hold duo to sepsis.      VTE Risk Mitigation (From admission, onward)      None            Discharge Planning   MAVIS:      Code Status: Full Code   Is the patient medically ready for discharge?:     Reason for patient still in hospital (select all that apply): Patient trending condition  Discharge Plan A: Home            Critical care time spent on the evaluation and treatment of severe organ dysfunction, review of pertinent labs and imaging studies, discussions with consulting providers and discussions with patient/family:  over 45  minutes.      José Miguel Cristina MD  Department of Hospital Medicine   [facility] - [place] - Intensive Care

## 2024-04-17 LAB
ANION GAP SERPL CALC-SCNC: 22 MMOL/L (ref 8–16)
BACTERIA BLD CULT: NORMAL
BACTERIA BLD CULT: NORMAL
BUN SERPL-MCNC: 34 MG/DL (ref 6–20)
CALCIUM SERPL-MCNC: 10.5 MG/DL (ref 8.7–10.5)
CHLORIDE SERPL-SCNC: 96 MMOL/L (ref 95–110)
CO2 SERPL-SCNC: 18 MMOL/L (ref 23–29)
CREAT SERPL-MCNC: 5.6 MG/DL (ref 0.5–1.4)
ERYTHROCYTE [SEDIMENTATION RATE] IN BLOOD BY WESTERGREN METHOD: 17 MM/HR (ref 0–20)
EST. GFR  (NO RACE VARIABLE): 8 ML/MIN/1.73 M^2
GLUCOSE SERPL-MCNC: 107 MG/DL (ref 70–110)
OHS QRS DURATION: 96 MS
OHS QTC CALCULATION: 454 MS
POCT GLUCOSE: 102 MG/DL (ref 70–110)
POCT GLUCOSE: 115 MG/DL (ref 70–110)
POCT GLUCOSE: 117 MG/DL (ref 70–110)
POCT GLUCOSE: 80 MG/DL (ref 70–110)
POTASSIUM SERPL-SCNC: 4.4 MMOL/L (ref 3.5–5.1)
SODIUM SERPL-SCNC: 136 MMOL/L (ref 136–145)
VANCOMYCIN SERPL-MCNC: 16.6 UG/ML

## 2024-04-17 PROCEDURE — 25000003 PHARM REV CODE 250: Performed by: HOSPITALIST

## 2024-04-17 PROCEDURE — 94761 N-INVAS EAR/PLS OXIMETRY MLT: CPT

## 2024-04-17 PROCEDURE — 97530 THERAPEUTIC ACTIVITIES: CPT | Mod: CQ

## 2024-04-17 PROCEDURE — S5010 5% DEXTROSE AND 0.45% SALINE: HCPCS | Performed by: HOSPITALIST

## 2024-04-17 PROCEDURE — 80048 BASIC METABOLIC PNL TOTAL CA: CPT | Performed by: HOSPITALIST

## 2024-04-17 PROCEDURE — 63600175 PHARM REV CODE 636 W HCPCS: Performed by: HOSPITALIST

## 2024-04-17 PROCEDURE — 11000001 HC ACUTE MED/SURG PRIVATE ROOM

## 2024-04-17 PROCEDURE — 36415 COLL VENOUS BLD VENIPUNCTURE: CPT | Performed by: INTERNAL MEDICINE

## 2024-04-17 PROCEDURE — 80202 ASSAY OF VANCOMYCIN: CPT | Performed by: HOSPITALIST

## 2024-04-17 PROCEDURE — 86580 TB INTRADERMAL TEST: CPT | Performed by: HOSPITALIST

## 2024-04-17 PROCEDURE — 30200315 PPD INTRADERMAL TEST REV CODE 302: Performed by: HOSPITALIST

## 2024-04-17 PROCEDURE — 99231 SBSQ HOSP IP/OBS SF/LOW 25: CPT | Mod: GW,HPC,, | Performed by: STUDENT IN AN ORGANIZED HEALTH CARE EDUCATION/TRAINING PROGRAM

## 2024-04-17 PROCEDURE — 85652 RBC SED RATE AUTOMATED: CPT | Performed by: INTERNAL MEDICINE

## 2024-04-17 PROCEDURE — 90935 HEMODIALYSIS ONE EVALUATION: CPT

## 2024-04-17 RX ADMIN — TUBERCULIN PURIFIED PROTEIN DERIVATIVE 5 UNITS: 5 INJECTION, SOLUTION INTRADERMAL at 07:04

## 2024-04-17 RX ADMIN — ASPIRIN 81 MG: 81 TABLET, COATED ORAL at 04:04

## 2024-04-17 RX ADMIN — METOPROLOL TARTRATE 25 MG: 25 TABLET, FILM COATED ORAL at 04:04

## 2024-04-17 RX ADMIN — VANCOMYCIN HYDROCHLORIDE 500 MG: 500 INJECTION, POWDER, LYOPHILIZED, FOR SOLUTION INTRAVENOUS at 04:04

## 2024-04-17 RX ADMIN — APIXABAN 2.5 MG: 2.5 TABLET, FILM COATED ORAL at 04:04

## 2024-04-17 RX ADMIN — LEVOTHYROXINE SODIUM 75 MCG: 75 TABLET ORAL at 05:04

## 2024-04-17 RX ADMIN — CEFEPIME 1 G: 1 INJECTION, POWDER, FOR SOLUTION INTRAMUSCULAR; INTRAVENOUS at 05:04

## 2024-04-17 RX ADMIN — MUPIROCIN: 20 OINTMENT TOPICAL at 04:04

## 2024-04-17 RX ADMIN — DEXTROSE AND SODIUM CHLORIDE: 5; 450 INJECTION, SOLUTION INTRAVENOUS at 06:04

## 2024-04-17 RX ADMIN — MUPIROCIN: 20 OINTMENT TOPICAL at 09:04

## 2024-04-17 RX ADMIN — ATORVASTATIN CALCIUM 40 MG: 40 TABLET, FILM COATED ORAL at 04:04

## 2024-04-17 RX ADMIN — FAMOTIDINE 20 MG: 20 TABLET ORAL at 04:04

## 2024-04-17 NOTE — PROGRESS NOTES
Pharmacokinetic Assessment Follow Up: IV Vancomycin    Vancomycin serum concentration assessment(s):    The random level was drawn correctly and can be used to guide therapy at this time. The measurement is within the desired definitive target range of 10 to 20 mcg/mL.    Vancomycin Regimen Plan:    Re-dose when the random level is less than 20 mcg/mL, next level to be drawn at 0400 on 4/18  Vancomycin 500mg after dialysis on 4/17  Drug levels (last 3 results):  Recent Labs   Lab Result Units 04/15/24  0454 04/16/24  0401 04/17/24  0506   Vancomycin, Random ug/mL 26.1 20.2 16.6       Pharmacy will continue to follow and monitor vancomycin.    Please contact pharmacy at extension [phone] for questions regarding this assessment.    Thank you for the consult,   Trice Matthew       Patient brief summary:  Eva Bloom is a 58 y.o. female initiated on antimicrobial therapy with IV Vancomycin for treatment of sepsis    Drug Allergies:   Review of patient's allergies indicates:   Allergen Reactions    Carvedilol Other (See Comments)     Nausea/vomiting    Allopurinol      Other reaction(s): abnormal transaminases       Actual Body Weight:   48.7 kg    Renal Function:   Estimated Creatinine Clearance: 8.3 mL/min (A) (based on SCr of 5.6 mg/dL (H)).,     Dialysis Method (if applicable):  intermittent HD    CBC (last 72 hours):  Recent Labs   Lab Result Units 04/15/24  0454 04/16/24  0401   WBC K/uL 7.99 5.69   Hemoglobin g/dL 11.7* 11.2*   Hematocrit % 40.5 36.4*   Platelets K/uL 189 172   Gran % % 60.5 69.3   Lymph % % 20.0 14.2*   Mono % % 14.3 11.2   Eosinophil % % 2.8 3.3   Basophil % % 1.5 1.6   Differential Method  Automated Automated       Metabolic Panel (last 72 hours):  Recent Labs   Lab Result Units 04/15/24  0454 04/16/24  0401 04/17/24  0506   Sodium mmol/L 141 136 136   Potassium mmol/L 5.1 3.6 4.4   Chloride mmol/L 104 99 96   CO2 mmol/L 11* 23 18*   Glucose mg/dL 34* 71 107   BUN mg/dL 56* 26* 34*    Creatinine mg/dL 6.4* 4.2* 5.6*       Vancomycin Administrations:  vancomycin given in the last 96 hours                     vancomycin (VANCOCIN) 500 mg in dextrose 5 % in water (D5W) 100 mL IVPB (MB+) (mg) 500 mg New Bag 04/14/24 0740    vancomycin (VANCOCIN) 1,000 mg in dextrose 5 % (D5W) 250 mL IVPB (Vial-Mate) ()  Restarted 04/13/24 1458      Restarted  1423     1,000 mg New Bag  1335                    Microbiologic Results:  Microbiology Results (last 7 days)       Procedure Component Value Units Date/Time    Blood culture x two cultures. Draw prior to antibiotics. [[id number]] Collected: 04/13/24 1126    Order Status: Completed Specimen: Blood from Peripheral, Hand, Right Updated: 04/16/24 1503     Blood Culture, Routine No Growth to date      No Growth to date      No Growth to date      No Growth to date    Narrative:      Aerobic and anaerobic    Blood culture x two cultures. Draw prior to antibiotics. [[id number]] Collected: 04/13/24 1103    Order Status: Completed Specimen: Blood from Peripheral, Forearm, Right Updated: 04/16/24 1503     Blood Culture, Routine No Growth to date      No Growth to date      No Growth to date      No Growth to date    Narrative:      Aerobic and anaerobic    Culture, Anaerobe [[id number]]     Order Status: Canceled Specimen: Body Fluid     Aerobic culture [[id number]]     Order Status: Canceled Specimen: Pleural Fluid

## 2024-04-17 NOTE — PLAN OF CARE
Problem: Device-Related Complication Risk (Hemodialysis)  Goal: Safe, Effective Therapy Delivery  4/17/2024 0901 by Catherine Napoles RN  Outcome: Ongoing, Progressing  4/17/2024 0816 by Catherine Napoles RN  Outcome: Ongoing, Progressing  4/17/2024 0814 by Catherine Napoles, FELIPE  Outcome: Ongoing, Progressing

## 2024-04-17 NOTE — ASSESSMENT & PLAN NOTE
Patient is lethargic today,code stroke was called,transfered to ICU,head CT show no acute process,evaluated by Tele neuro,will have CTA head,check ammonia and TSH,hypoglycemia is resolved,will monitor closely..imagines and labs show no acute process,acute metabolic encephalopathy  seems improving,consulted neurology,ordered EEG.show no seizure activity,but metabolic  encephalopathy,MRI can not be done duo to AICD.

## 2024-04-17 NOTE — PLAN OF CARE
Problem: Adjustment to Illness (Sepsis/Septic Shock)  Goal: Optimal Coping  4/17/2024 0816 by Catherine Napoles, RN  Outcome: Ongoing, Progressing  4/17/2024 0814 by Catherine Napoles, RN  Outcome: Ongoing, Progressing

## 2024-04-17 NOTE — PROGRESS NOTES
"alert today does not interact with me at all     Opens her eyes briefly not following commands     HPI/ROS unable to obtain     CXR terrible cardiomegaly     Lactic acidosis noted tx with hd last lv 2.1     Hypercalcemia noted 11.5 alb 3.5     at bedside He tells me that she has hx of cancer was on "pills". Her Dr name is Miguel    EEG with diffuse waves    Past Medical History:   Diagnosis Date    Anemia     Anticoagulant long-term use     Atrial fibrillation     Bronchitis 03/01/2017    Cancer 2016    kidney cancer    CHF (congestive heart failure), NYHA class II, chronic, systolic     CMV (cytomegalovirus) antibody positive     CVA (cerebral vascular accident) 1/3/2024    Encounter for blood transfusion     ESRD (end stage renal disease)     Essential hypertension 09/23/2015    H/O herpes simplex type 2 infection     Herpes simplex type 1 antibody positive     History of kidney cancer     s/p left nephrectomy 1/2016    Hyperparathyroidism, unspecified     Hyperuricemia without signs of inflammatory arthritis and tophaceous disease     Kidney stones     LGSIL (low grade squamous intraepithelial dysplasia)     Myocardiopathy 07/21/2017    Prediabetes     Proteinuria     Renal disorder     Thyroid nodule     Urate nephropathy      Past Surgical History:   Procedure Laterality Date    BREAST CYST EXCISION      COLONOSCOPY N/A 11/12/2015    COLONOSCOPY N/A 03/12/2021    Procedure: COLONOSCOPY;  Surgeon: Brendon Lanier MD;  Location: [facility];  Service: Endoscopy;  Laterality: N/A;  covid test 3/9, labs prior, prep instr mailed -ml    EXCISION OF ARTERIOVENOUS FISTULA Left 09/27/2023    Procedure: EXCISION, AV FISTULA;  Surgeon: FARIDEH Muñoz III, MD;  Location: [address];  Service: Vascular;  Laterality: Left;  LUE AV graft excision    INSERTION OF BIVENTRICULAR IMPLANTABLE CARDIOVERTER-DEFIBRILLATOR (ICD)  04/2021    INSERTION OF TUNNELED CENTRAL VENOUS CATHETER (CVC) WITH SUBCUTANEOUS PORT N/A " 01/25/2023    Procedure: INSERTION, DUAL LUMEN CATHETER WITH PORT, WITH IMAGING GUIDANCE;  Surgeon: FARIDEH Muoñz III, MD;  [facility];  Service: Peripheral Vascular;  Laterality: N/A;  possinble permcath placment    NEPHRECTOMY-LAPAROSCOPIC Left 01/12/2016    PERCUTANEOUS TRANSLUMINAL ANGIOPLASTY OF ARTERIOVENOUS FISTULA Left 04/19/2023    Procedure: PTA, AV FISTULA;  Surgeon: FARIDEH Muñoz III, MD;  Location: [facility] CATH LAB;  Service: Peripheral Vascular;  Laterality: Left;    PERITONEAL CATHETER INSERTION      Permacath insertion  01/12/2017    PLACEMENT OF ARTERIOVENOUS GRAFT  12/28/2022    Procedure: INSERTION, GRAFT, ARTERIOVENOUS;  Surgeon: FARIDEH Muñoz III, MD;  [facility];  Service: Peripheral Vascular;;    REMOVAL, GRAFT, ARTERIOVENOUS, UPPER EXTREMITY Left 01/25/2023    Procedure: REMOVAL, GRAFT, ARTERIOVENOUS, UPPER EXTREMITY;  Surgeon: FARIDEH Muñoz III, MD;  [facility];  Service: Peripheral Vascular;  Laterality: Left;    REVISION OF ARTERIOVENOUS FISTULA Left 05/01/2023    Procedure: REVISION, AV FISTULA;  Surgeon: FARIDEH Muñoz III, MD;  [facility];  Service: Peripheral Vascular;  Laterality: Left;  LUE AVG revision    REVISION OF PROCEDURE INVOLVING ARTERIOVENOUS GRAFT Left 12/28/2022    Procedure: REVISION, PROCEDURE INVOLVING ARTERIOVENOUS GRAFT;  Surgeon: FARIDEH Muñoz III, MD;  [facility];  Service: Peripheral Vascular;  Laterality: Left;    REVISION OF PROCEDURE INVOLVING ARTERIOVENOUS GRAFT Left 07/21/2023    Procedure: LEFT ARM ARTERIOVENOUS GRAFT EXCISION  AND LIGATION;  Surgeon: Obi Werner MD;  Location: [facility];  Service: Vascular;  Laterality: Left;  Left AVG excision and revision with interposition    SALPINGOOPHORECTOMY Right 2016    KJB---DAVINCI    TONSILLECTOMY      TUBAL LIGATION       Review of patient's allergies indicates:   Allergen Reactions    Carvedilol Other (See Comments)      Nausea/vomiting    Allopurinol      Other reaction(s): abnormal transaminases       Current Facility-Administered Medications   Medication Dose Route Frequency Provider Last Rate Last Admin    acetaminophen tablet 650 mg  650 mg Oral Q6H PRN José Miguel Cristina MD        aspirin EC tablet 81 mg  81 mg Oral Daily José Miguel Cristina MD   81 mg at 04/16/24 1006    atorvastatin tablet 40 mg  40 mg Oral Daily José Miguel Cristina MD   40 mg at 04/16/24 1006    ceFEPIme (MAXIPIME) 1 g in dextrose 5 % in water (D5W) 100 mL IVPB (MB+)  1 g Intravenous Q24H José Miguel Cristina MD   Stopped at 04/16/24 0923    dextrose 10% bolus 125 mL 125 mL  12.5 g Intravenous PRN José Miguel Cristina MD        dextrose 10% bolus 250 mL 250 mL  25 g Intravenous PRN José Miguel Cristina MD        dextrose 5 % and 0.45 % NaCl infusion   Intravenous Continuous José Miguel Cristina MD 50 mL/hr at 04/17/24 0602 New Bag at 04/17/24 0602    famotidine tablet 20 mg  20 mg Oral Daily José Miguel Cristina MD   20 mg at 04/16/24 1006    glucagon (human recombinant) injection 1 mg  1 mg Intramuscular PRN José Miguel Cristina MD        glucose chewable tablet 16 g  16 g Oral PRN José Miguel Cristina MD        glucose chewable tablet 24 g  24 g Oral PRN José Miguel Cristina MD        levothyroxine tablet 75 mcg  75 mcg Oral Before breakfast José Miguel Cristina MD   75 mcg at 04/17/24 0557    metoprolol tartrate (LOPRESSOR) tablet 25 mg  25 mg Oral BID José Miguel Cristina MD   25 mg at 04/16/24 2204    mupirocin 2 % ointment   Nasal BID José Miguel Cristina MD   Given at 04/16/24 2210    vancomycin - pharmacy to dose   Intravenous pharmacy to manage frequency José Miguel Cristina MD         Facility-Administered Medications Ordered in Other Encounters   Medication Dose Route Frequency Provider Last Rate Last Admin    0.9%  NaCl infusion   Intravenous Continuous Soni Bhatti MD   New Bag at 07/21/23  1116   ECho 3/23/      Show Result ComparisonThe left ventricle is normal in size with mild eccentric hypertrophy and severely decreased systolic function.  The estimated ejection fraction is 23%.  There is severe left ventricular global hypokinesis.  Grade II left ventricular diastolic dysfunction.  Moderate left atrial enlargement.  Normal right ventricular size with normal right ventricular systolic function.  Moderate right atrial enlargement.  There is mild aortic valve stenosis.  Aortic valve area is 1.58 cm2; peak velocity is 2.01 m/s; mean gradient is 8 mmHg.  Moderate mitral regurgitation.  Moderate to severe tricuspid regurgitation.  Mild pulmonic regurgitation.  Elevated central venous pressure (15 mmHg).  The estimated PA systolic pressure is 47 mmHg.  There is mild-moderate pulmonary hypertension.  Moderate posterolateral pericardial effusion. Trivial under the RA    LABS    Recent Results (from the past 24 hour(s))   Lactic acid, plasma    Collection Time: 04/16/24  9:50 AM   Result Value Ref Range    Lactate (Lactic Acid) 2.1 0.5 - 2.2 mmol/L   POCT glucose    Collection Time: 04/16/24 11:45 AM   Result Value Ref Range    POCT Glucose 112 (H) 70 - 110 mg/dL   POCT glucose    Collection Time: 04/16/24  5:34 PM   Result Value Ref Range    POCT Glucose 113 (H) 70 - 110 mg/dL   POCT glucose    Collection Time: 04/16/24  8:11 PM   Result Value Ref Range    POCT Glucose 95 70 - 110 mg/dL   POCT glucose    Collection Time: 04/16/24 11:32 PM   Result Value Ref Range    POCT Glucose 130 (H) 70 - 110 mg/dL   POCT glucose    Collection Time: 04/17/24  4:17 AM   Result Value Ref Range    POCT Glucose 115 (H) 70 - 110 mg/dL   Vancomycin, Random    Collection Time: 04/17/24  5:06 AM   Result Value Ref Range    Vancomycin, Random 16.6 Not established ug/mL   Basic Metabolic Panel    Collection Time: 04/17/24  5:06 AM   Result Value Ref Range    Sodium 136 136 - 145 mmol/L    Potassium 4.4 3.5 - 5.1 mmol/L     "Chloride 96 95 - 110 mmol/L    CO2 18 (L) 23 - 29 mmol/L    Glucose 107 70 - 110 mg/dL    BUN 34 (H) 6 - 20 mg/dL    Creatinine 5.6 (H) 0.5 - 1.4 mg/dL    Calcium 10.5 8.7 - 10.5 mg/dL    Anion Gap 22 (H) 8 - 16 mmol/L    eGFR 8 (A) >60 mL/min/1.73 m^2   POCT glucose    Collection Time: 04/17/24  7:32 AM   Result Value Ref Range    POCT Glucose 117 (H) 70 - 110 mg/dL   ]    I/O last 3 completed shifts:  In: 30 [P.O.:30]  Out: 0     Vitals:    04/17/24 0249 04/17/24 0431 04/17/24 0717 04/17/24 0729   BP:  101/67  119/86   Pulse: 78 81 89 89   Resp:  19  18   Temp:  96.4 °F (35.8 °C)  98.5 °F (36.9 °C)   TempSrc:  Axillary  Oral   SpO2:  98%  (!) 94%   Weight:  48.7 kg (107 lb 5.8 oz)     Height:           Pos Jvd, No Thyromegaly or Lymphadenopathy  Lungs: Fairly clear anteriorly and laterally  Cor: RRR no G or rubs  Abd: Soft benign good bowel sounds non tender  Ext: No E C C    A)    ESRD x 8 y Normal HD TTS this week mwf seems to be well dialyzed   AMS with diffuse slow eeg waves  Hypoglycemia ( in esrd patient might be an ominous sign)   Anemia   Hx of cva ? New CVA  Hx of prediabetes  Hypercalcemia ( mild (<12)     P)    Renal Diet when able   Home meds when able  Protect access  HD might need hd low intensity today  EPO prn   Binders prn   Adjust all meds to the degree of renal fx  Close follow up I/O and weights  Maintain Hydration     Tx for hypercalcemia some times include IVF, biphosphonate and calcitonin. Of those 3 calcitonin is most "kidney friendly patient". The casue for Hypercalcemia might be related to her cancer        "

## 2024-04-17 NOTE — PROGRESS NOTES
Ms. Bloom is a 58 year old female, with medical history of Afib, CHF NYHA class II, chronic, systolic, CVA, ESRD on dialysis (Tues, Thurs, Sat), HTN, Hx of kidney cancer, hyperparathyroidism, myocardiopathy, and prediabetes with admission concerns of sepsis. Hospital course complicated by reduced level of consciousness / worsening encephalopathy. And neurology has been consulted for the same.      History from medical records review. As per report - was noted to be lethargic / with reduced follow of commands and level of awareness. No reported clinical seizures or complex migranines. No focal motor or cranial nerve deficits. CT head negative for acute findings / CTA was suboptimal however negative for any obvious LVO.      Improvement in overall level of alertness and consciousness over the ICU stay. Awake and alert / still remains encephalopathic - less participatory to orientation questions / marjorly non verbal - no focal motor or cranial nerve deficits.     4/17: Not seen today / in dialysis. Per report - improvement in overall trend of encephalopathy / yet with fluctuations.   EEG: slowing and disorganization along with frequent triphasic waves is suggestive of a generalized metabolic encephalopathy.  There is no evidence of an epileptic process OR seizures    MRI Brain: ICD contraindication      Recs:   Encephalopathy - suspect metabolic / systemic infectious with confounders of hypoactive delirium. Low suspicion for focal cerebrovascular ischemic event or epileptic or neuro inflammatory etiology - however needs rule out / given background of off AC      - resume AC if no absolute contraindications   - repeat CT head if any new focal deficits       Continue Encephalopathy evaluation and management  -- Correct lytes as needed / investigate and control infection if any / avoid hypoxia or hypercapnia / avoid hypoglycemia / control uremia - avoid rapid correction / avoid-correct metabolic-respiratory acidosis or  alkalosis   -- Correct any nutritional deficiencies / correct anemia / provide nutritional support as appropriate / ambulate when appropriate   -- PT/OT evaluation and management      Delirium precautions    - Recommend PRN depacon 250 mg IV qam and 500 mg IV qhs for management of agitation and behavioral disturbances associated with delirium.  - Recommend Zyprexa 2.5 mg PO/IM q6h PRN severe, nonredirectable agitation.  Max dose is 30 mg daily from all sources.  Parenteral Zyprexa is not to be given within 1 hours of parenteral benzodiazepines including Ativan.    - Recommend standard delirium precautions:               - Avoid use of physical restraints and judiciously use chemical sedatives for management of agitation              - Avoid deliriogenic agents if possible including benzodiazepines, anticholinergics, and opiates              - Normalize patient's sleep-wake cycle if possible              - Environmental adjustments to include bright lighting and windows open during the day              - Early involvement in PT/OT              - Recommend physical sitter to be present at bedside.            Patria Newton MD    General Neurology, [facility],   [address] [facility] [address], [address], [place] [address]    Vascular Neurology, Endovascular Neurosurgery,   [facility], [address] [place] [id number]

## 2024-04-17 NOTE — PT/OT/SLP PROGRESS
Physical Therapy Treatment    Patient Name:  Eva Bloom   MRN:  [id number]    Recommendations:     Discharge Recommendations: Moderate Intensity Therapy  Discharge Equipment Recommendations: none  Barriers to discharge: None    Assessment:     Eva Bloom is a 58 y.o. female admitted with a medical diagnosis of Sepsis.  She presents with the following impairments/functional limitations: weakness, impaired self care skills, impaired functional mobility, gait instability, decreased lower extremity function, decreased upper extremity function, decreased ROM, impaired cognition, impaired balance, decreased safety awareness, impaired coordination, decreased coordination, impaired endurance .    Rehab Prognosis: Fair; patient would benefit from acute skilled PT services to address these deficits and reach maximum level of function.    Recent Surgery: * No surgery found *      Plan:     During this hospitalization, patient to be seen 5 x/week to address the identified rehab impairments via gait training, therapeutic activities, therapeutic exercises, wheelchair management/training and progress toward the following goals:    Plan of Care Expires:  04/30/24    Subjective     Chief Complaint: pt with limited verbal communication and lethargic   Patient/Family Comments/goals: pt spouse presents   Pain/Comfort:  Pain Rating 1: 0/10  Pain Rating Post-Intervention 1: 0/10      Objective:     Communicated with nurse Alexander  prior to session.  Patient found HOB elevated with telemetry, bed alarm, peripheral IV upon PT entry to room.     General Precautions: Standard, fall  Orthopedic Precautions: N/A  Braces: N/A  Respiratory Status: Room air     Functional Mobility:  Bed Mobility:   Rolling Right : total assistance     Rolling Left:  total assistance  Scooting: total assistance x 2       AM-PAC 6 CLICK MOBILITY  Turning over in bed (including adjusting bedclothes, sheets and blankets)?: 2  Sitting down on and standing up  from a chair with arms (e.g., wheelchair, bedside commode, etc.): 2  Moving from lying on back to sitting on the side of the bed?: 2  Moving to and from a bed to a chair (including a wheelchair)?: 2  Need to walk in hospital room?: 2  Climbing 3-5 steps with a railing?: 1  Basic Mobility Total Score: 11       Treatment & Education:  Re-positioned pt in bed with total A x 2 .     Patient left with bed in chair position with HOB elevated, heels offloading, pillow between knees  all lines intact, call button in reach, bed alarm on, nurse notified, and spouse  present..    GOALS:   Multidisciplinary Problems       Physical Therapy Goals          Problem: Physical Therapy    Goal Priority Disciplines Outcome Goal Variances Interventions   Physical Therapy Goal     PT, PT/OT Ongoing, Progressing     Description: Goals to be met by: 24     Patient will increase functional independence with mobility by performin. Supine to sit with Minimal Assistance  2. Rolling to Left and Right with Minimal Assistance  3. Sit to stand transfer with Minimal Assistance using RW  4. Bed to chair transfer with Minimal Assistance using Rolling Walker  5. Gait x50 feet with Minimal Assistance using Rolling Walker   6. Wheelchair propulsion x100 feet with Minimal Assistance using bilateral upper extremities  7. Lower extremity exercise program 2 sets x10 reps per handout, with independence                         Time Tracking:     PT Received On: 24  PT Start Time: 153     PT Stop Time: 1548  PT Total Time (min): 12 min     Billable Minutes: Therapeutic Activity 12    Treatment Type: Treatment  PT/PTA: PTA     Number of PTA visits since last PT visit: 2024

## 2024-04-17 NOTE — NURSING
[facility], [facility] [place]  Nurses Note -- 4 Eyes      4/17/2024       Skin assessed on: Q Shift      [] No Pressure Injuries Present    []Prevention Measures Documented    [x] Yes LDA  for Pressure Injury Previously documented     [] Yes New Pressure Injury Discovered   [] LDA for New Pressure Injury Added      Attending RN:  Catherine Napoles RN     Second RN:  Daniella Stock RN

## 2024-04-17 NOTE — PT/OT/SLP PROGRESS
Occupational Therapy      Patient Name:  Eva Bloom   MRN:  [id number]    Patient not seen today secondary to Dialysis. Will follow-up as able.    4/17/2024

## 2024-04-17 NOTE — NURSING
[facility] [place]  Nurses Note -- 4 Eyes      4/16/2024      Skin assessed on: Q Shift      [] No Pressure Injuries Present    []Prevention Measures Documented    [x] Yes LDA  for Pressure Injury Previously documented     [] Yes New Pressure Injury Discovered   [] LDA for New Pressure Injury Added      Attending RN:  Michelle Stock, FELIPE     Second RN:  Catherine Napoles RN

## 2024-04-17 NOTE — NURSING
[facility], [facility] [place]  Nurses Note -- 4 Eyes      4/17/2024       Skin assessed on: Q Shift      [] No Pressure Injuries Present    []Prevention Measures Documented    [x] Yes LDA  for Pressure Injury Previously documented     [] Yes New Pressure Injury Discovered   [] LDA for New Pressure Injury Added      Attending RN:  Catherine Napoles RN     Second RN:  Daniella Stock RN

## 2024-04-17 NOTE — PLAN OF CARE
Problem: Adult Inpatient Plan of Care  Goal: Plan of Care Review  Outcome: Ongoing, Progressing  Goal: Patient-Specific Goal (Individualized)  Outcome: Ongoing, Progressing  Goal: Absence of Hospital-Acquired Illness or Injury  Outcome: Ongoing, Progressing  Goal: Optimal Comfort and Wellbeing  Outcome: Ongoing, Progressing  Goal: Readiness for Transition of Care  Outcome: Ongoing, Progressing     Problem: Glycemic Control Impaired (Sepsis/Septic Shock)  Goal: Blood Glucose Level Within Desired Range  Outcome: Ongoing, Progressing  Intervention: Optimize Glycemic Control  Flowsheets (Taken 4/17/2024 0401)  Glycemic Management: blood glucose monitored

## 2024-04-17 NOTE — PROGRESS NOTES
[facility] Medicine  Progress Note    Patient Name: Eva Bloom  MRN: [id number]  Patient Class: IP- Inpatient   Admission Date: 4/13/2024  Length of Stay: 4 days  Attending Physician: José Miguel Cristina, *  Primary Care Provider: Sowmya Mccain MD        Subjective:     Principal Problem:Sepsis        HPI:  This is a 58 year old female, with a Anemia, Atrial fibrillation, CHF NYHA class II, chronic, systolic, CVA, ESRD on dialysis (Tues, Thurs, Sat) with completion of session today, Essential HTN, Hx of kidney cancer, Hyperparathyroidism, Myocardiopathy, and Prediabetes, who presents to the ED complaining of Palpitations, symptoms onset PTA. Patient reports cough, sleep disturbance, and generalized pruritus. Patient states she was at dialysis PTA and receiving dialysis when her heart rate jumped into the 120's and the dialysis nurses instructed her to present to the ER. Patient also states she has been on dialysis for 8 years and that she does not make urine. Patient further states she is on 2.5 mg of Eliquis. Additional history obtained from independent historian: patient's  states that in July last year she had a spot on her left foot and had a line inserted. Patient denies Hx of irregular heart rate or MI. Patient also denies rhinorrhea, sore throat, abdominal pain, emesis, diarrhea, or other associated symptoms. No other alleviating or exacerbating factors. This is the extent of the patient's complaints in the ED.patient is febrile in .8,tachycardic 120.sepsis likely  duo to l;ine infection,patient has been started on broad  spectrum IV Abx,blood culture is pending.    Overview/Hospital Course:  This is a 58 year old female, with a Anemia, Atrial fibrillation, CHF NYHA class II, chronic, systolic, CVA, ESRD on dialysis (Tues, Thurs, Sat) with completion of session today, Essential HTN, Hx of kidney cancer, Hyperparathyroidism, Myocardiopathy, and Prediabetes, who  presents to the ED complaining of Palpitations, symptoms onset PTA. Patient reports cough, sleep disturbance, and generalized pruritus. Patient states she was at dialysis PTA and receiving dialysis when her heart rate jumped into the 120's and the dialysis nurses instructed her to present to the ER. .patient is febrile in .8,tachycardic 120.sepsis likely  duo to l;ine infection,patient has been started on broad  spectrum IV Abx,blood culture is pending.HR and BP is improved at this time.cultures sofar negative.  Patient is lethargic today,code stroke was called,transfered to ICU,head CT show no acute process,evaluated by Tele neuro,will have CTA head,check ammonia and TSH,hypoglycemia is resolved,will monitor closely.imagines and labs show no acute process,acute metabolic encephalopathy  seems improving,consulted neurology,ordered EEG.show no seizure activity,but metabolic  encephalopathy,MRI can not be done duo to AICD.    Interval History: .imagines and labs show no acute process,acute metabolic encephalopathy  seems improving,consulted neurology,ordered EEG.,show no seizure activity,but metabolic  encephalopathy,MRI can not be done duo to AICD.    Review of Systems   Constitutional:  Positive for activity change and appetite change.   Respiratory:  Negative for apnea and chest tightness.    Gastrointestinal:  Negative for abdominal distention and abdominal pain.   Endocrine: Negative for cold intolerance and heat intolerance.   Genitourinary:  Negative for difficulty urinating and dyspareunia.   Neurological:  Positive for weakness.   Psychiatric/Behavioral:  Positive for confusion.      Objective:     Vital Signs (Most Recent):  Temp: 98.5 °F (36.9 °C) (04/17/24 0729)  Pulse: 84 (04/17/24 1110)  Resp: 18 (04/17/24 0729)  BP: 119/86 (04/17/24 0729)  SpO2: (!) 94 % (04/17/24 0729) Vital Signs (24h Range):  Temp:  [96.4 °F (35.8 °C)-98.5 °F (36.9 °C)] 98.5 °F (36.9 °C)  Pulse:  [78-92] 84  Resp:  [16-33]  "18  SpO2:  [92 %-99 %] 94 %  BP: (101-143)/(67-90) 119/86     Weight: 48.7 kg (107 lb 5.8 oz)  Body mass index is 18.43 kg/m².    Intake/Output Summary (Last 24 hours) at 4/17/2024 1138  Last data filed at 4/17/2024 1006  Gross per 24 hour   Intake 120 ml   Output 2 ml   Net 118 ml         Physical Exam  HENT:      Left Ear: There is no impacted cerumen.   Cardiovascular:      Rate and Rhythm: Normal rate and regular rhythm.   Pulmonary:      Effort: No respiratory distress.      Breath sounds: No wheezing.   Abdominal:      General: There is no distension.   Musculoskeletal:         General: No swelling.   Neurological:      Mental Status: She is alert. She is disoriented.             Significant Labs: All pertinent labs within the past 24 hours have been reviewed.    Significant Imaging: I have reviewed all pertinent imaging results/findings within the past 24 hours.    Assessment/Plan:      * Sepsis  This patient does have evidence of infective focus  My overall impression is sepsis.  Source:  line infection   Antibiotics given-   Antibiotics (72h ago, onward)      Start     Stop Route Frequency Ordered    04/13/24 2100  mupirocin 2 % ointment         04/18/24 2059 Nasl 2 times daily 04/13/24 1319    04/13/24 2000  ceFEPIme (MAXIPIME) 1 g in dextrose 5 % in water (D5W) 100 mL IVPB (MB+)         -- IV Every 12 hours (non-standard times) 04/13/24 1330    04/13/24 1217  vancomycin - pharmacy to dose  (vancomycin IVPB (PEDS and ADULTS))        Placed in "And" Linked Group    -- IV pharmacy to manage frequency 04/13/24 1117          Latest lactate reviewed-  Recent Labs   Lab 04/13/24  1107 04/13/24  1423   LACTATE  --  2.8*   POCLAC 3.67*  --        Organ dysfunction indicated by Acute kidney injury    Fluid challenge Ideal Body Weight- The patient's ideal body weight is Ideal body weight: 54.7 kg (120 lb 9.5 oz) which will be used to calculate fluid bolus of 30 ml/kg for treatment of septic shock.      Post- " resuscitation assessment Yes Perfusion exam was performed within 6 hours of septic shock presentation after bolus shows Adequate tissue perfusion assessed by non-invasive monitoring       Will Not start Pressors- Levophed for MAP of 65  Source control achieved by: IV Abx.  HR and BP is improved at this time.cultures sofar negative.    Acute metabolic encephalopathy    Patient is lethargic today,code stroke was called,transfered to ICU,head CT show no acute process,evaluated by Tele neuro,will have CTA head,check ammonia and TSH,hypoglycemia is resolved,will monitor closely..imagines and labs show no acute process,acute metabolic encephalopathy  seems improving,consulted neurology,ordered EEG.show no seizure activity,but metabolic  encephalopathy,MRI can not be done duo to AICD.    Somnolence  As above.      PAD (peripheral artery disease)  On  medical treatments,wound care .      Severe protein-calorie malnutrition  Nutrition consulted. Most recent weight and BMI monitored-     Measurements:  Wt Readings from Last 1 Encounters:   04/13/24 46.3 kg (102 lb)   Body mass index is 17.51 kg/m².    Patient has been screened and assessed by RD.    Malnutrition Type:  Context:    Level:      Malnutrition Characteristic Summary:       Interventions/Recommendations (treatment strategy):         Hypothyroidism  On synthroid.      PAF (paroxysmal atrial fibrillation)   On OAC and BB.    ICD (implantable cardioverter-defibrillator) in place - SubQ  Will monitor.      Chronic combined systolic and diastolic congestive heart failure  Patient is identified as having Combined Systolic and Diastolic heart failure that is Chronic. CHF is currently controlled. Latest ECHO performed and demonstrates- Results for orders placed during the hospital encounter of 05/08/23    Echo Saline Bubble? No    Interpretation Summary  · The left ventricle is normal in size with mild eccentric hypertrophy and severely decreased systolic function.  · The  estimated ejection fraction is 23%.  · There is severe left ventricular global hypokinesis.  · Grade II left ventricular diastolic dysfunction.  · Moderate left atrial enlargement.  · Normal right ventricular size with normal right ventricular systolic function.  · Moderate right atrial enlargement.  · There is mild aortic valve stenosis.  · Aortic valve area is 1.58 cm2; peak velocity is 2.01 m/s; mean gradient is 8 mmHg.  · Moderate mitral regurgitation.  · Moderate to severe tricuspid regurgitation.  · Mild pulmonic regurgitation.  · Elevated central venous pressure (15 mmHg).  · The estimated PA systolic pressure is 47 mmHg.  · There is mild-moderate pulmonary hypertension.  · Moderate posterolateral pericardial effusion. Trivial under the RA  . Continue Beta Blocker and monitor clinical status closely. Monitor on telemetry. Patient is off CHF pathway.  Monitor strict Is&Os and daily weights.  Place on fluid restriction of 1.5 L. Cardiology has not been consulted. Continue to stress to patient importance of self efficacy and  on diet for CHF. Last BNP reviewed- and noted below   Recent Labs   Lab 04/13/24  1104   BNP >4,900*       Pulmonary hypertension  Fluid removal with HD.      Essential hypertension  On hold duo to sepsis.      VTE Risk Mitigation (From admission, onward)      None            Discharge Planning   MAVIS:      Code Status: Full Code   Is the patient medically ready for discharge?:     Reason for patient still in hospital (select all that apply): Patient trending condition  Discharge Plan A: Home                  José Miguel Cristina MD  Department of Hospital Medicine   [facility]

## 2024-04-17 NOTE — NURSING
Patient return from dialysis, handoff given per dialysis nurse which stated that 500 was taken off.  Patient return alert, voiced no c/o of pain or discomfort and patient remain nonengaged with staff.

## 2024-04-17 NOTE — PLAN OF CARE
SW contacted patient's spouse to discuss discharge planning. PT/OT recommended SNF, patient's spouse stated he prefers for her to continue with . Patient is current with [facility] .     04/17/24 5617   Discharge Reassessment   Assessment Type Discharge Planning Reassessment   Did the patient's condition or plan change since previous assessment? No   Name(s) and Number(s) Otis Bloom (Spouse)  [phone] (Mobile)   Communicated MAVIS with patient/caregiver Date not available/Unable to determine   Discharge Plan A Home Health   Discharge Plan B Home with family   DME Needed Upon Discharge  none   Transition of Care Barriers None   Why the patient remains in the hospital Requires continued medical care   Post-Acute Status   Post-Acute Authorization Home Health   Coverage Medicare   Discharge Delays None known at this time

## 2024-04-17 NOTE — NURSING
Patient transferred from ICU handoff given from nurse Emily RN.  Nurse reported that patient was on HD, dialysis days Tues, Thurs, and Saturday.  Wounds to left heel, black with skin intact, left toe ulcer and right lateral thigh ulcer.  Nurse reported that patient have two peripheral IV, 20 gauge right forearm and 22 to right hand, and also that patient is on cardiac monitor.   Patient arrived to room @ 1830 via stretcher with transport.  Patient appear to be alert but doesn't engage with staff.  Tele box in place but not transmitting rhythm, will call bunker and get tele on monitor. Noted  at bedside and voiced that he would be staying all night.  Patient condition stable at this time.

## 2024-04-17 NOTE — PT/OT/SLP PROGRESS
Physical Therapy      Patient Name:  Eva Bloom   MRN:  [id number]    Patient not seen today secondary to Dialysis. Will follow-up as able later hour/day .

## 2024-04-17 NOTE — SUBJECTIVE & OBJECTIVE
Interval History: .imagines and labs show no acute process,acute metabolic encephalopathy  seems improving,consulted neurology,ordered EEG.,show no seizure activity,but metabolic  encephalopathy,MRI can not be done duo to [facility].    Review of Systems   Constitutional:  Positive for activity change and appetite change.   Respiratory:  Negative for apnea and chest tightness.    Gastrointestinal:  Negative for abdominal distention and abdominal pain.   Endocrine: Negative for cold intolerance and heat intolerance.   Genitourinary:  Negative for difficulty urinating and dyspareunia.   Neurological:  Positive for weakness.   Psychiatric/Behavioral:  Positive for confusion.      Objective:     Vital Signs (Most Recent):  Temp: 98.5 °F (36.9 °C) (04/17/24 0729)  Pulse: 84 (04/17/24 1110)  Resp: 18 (04/17/24 0729)  BP: 119/86 (04/17/24 0729)  SpO2: (!) 94 % (04/17/24 0729) Vital Signs (24h Range):  Temp:  [96.4 °F (35.8 °C)-98.5 °F (36.9 °C)] 98.5 °F (36.9 °C)  Pulse:  [78-92] 84  Resp:  [16-33] 18  SpO2:  [92 %-99 %] 94 %  BP: (101-143)/(67-90) 119/86     Weight: 48.7 kg (107 lb 5.8 oz)  Body mass index is 18.43 kg/m².    Intake/Output Summary (Last 24 hours) at 4/17/2024 1138  Last data filed at 4/17/2024 1006  Gross per 24 hour   Intake 120 ml   Output 2 ml   Net 118 ml         Physical Exam  HENT:      Left Ear: There is no impacted cerumen.   Cardiovascular:      Rate and Rhythm: Normal rate and regular rhythm.   Pulmonary:      Effort: No respiratory distress.      Breath sounds: No wheezing.   Abdominal:      General: There is no distension.   Musculoskeletal:         General: No swelling.   Neurological:      Mental Status: She is alert. She is disoriented.             Significant Labs: All pertinent labs within the past 24 hours have been reviewed.    Significant Imaging: I have reviewed all pertinent imaging results/findings within the past 24 hours.

## 2024-04-18 ENCOUNTER — EXTERNAL HOME HEALTH (OUTPATIENT)
Dept: HOME HEALTH SERVICES | Facility: HOSPITAL | Age: 59
End: 2024-04-18
Payer: MEDICARE

## 2024-04-18 LAB
ANION GAP SERPL CALC-SCNC: 15 MMOL/L (ref 8–16)
BASOPHILS # BLD AUTO: 0.06 K/UL (ref 0–0.2)
BASOPHILS NFR BLD: 1.3 % (ref 0–1.9)
BUN SERPL-MCNC: 16 MG/DL (ref 6–20)
CALCIUM SERPL-MCNC: 10.4 MG/DL (ref 8.7–10.5)
CHLORIDE SERPL-SCNC: 98 MMOL/L (ref 95–110)
CO2 SERPL-SCNC: 20 MMOL/L (ref 23–29)
CREAT SERPL-MCNC: 3.8 MG/DL (ref 0.5–1.4)
DIFFERENTIAL METHOD BLD: ABNORMAL
EOSINOPHIL # BLD AUTO: 0.1 K/UL (ref 0–0.5)
EOSINOPHIL NFR BLD: 2.2 % (ref 0–8)
ERYTHROCYTE [DISTWIDTH] IN BLOOD BY AUTOMATED COUNT: 21.7 % (ref 11.5–14.5)
EST. GFR  (NO RACE VARIABLE): 13 ML/MIN/1.73 M^2
GLUCOSE SERPL-MCNC: 101 MG/DL (ref 70–110)
HCT VFR BLD AUTO: 35.6 % (ref 37–48.5)
HGB BLD-MCNC: 10.7 G/DL (ref 12–16)
IMM GRANULOCYTES # BLD AUTO: 0.03 K/UL (ref 0–0.04)
IMM GRANULOCYTES NFR BLD AUTO: 0.7 % (ref 0–0.5)
LYMPHOCYTES # BLD AUTO: 0.7 K/UL (ref 1–4.8)
LYMPHOCYTES NFR BLD: 15.8 % (ref 18–48)
MCH RBC QN AUTO: 28.1 PG (ref 27–31)
MCHC RBC AUTO-ENTMCNC: 30.1 G/DL (ref 32–36)
MCV RBC AUTO: 93 FL (ref 82–98)
MONOCYTES # BLD AUTO: 0.5 K/UL (ref 0.3–1)
MONOCYTES NFR BLD: 10.6 % (ref 4–15)
NEUTROPHILS # BLD AUTO: 3.2 K/UL (ref 1.8–7.7)
NEUTROPHILS NFR BLD: 69.4 % (ref 38–73)
NRBC BLD-RTO: 2 /100 WBC
PLATELET # BLD AUTO: 131 K/UL (ref 150–450)
PMV BLD AUTO: 12.5 FL (ref 9.2–12.9)
POCT GLUCOSE: 114 MG/DL (ref 70–110)
POCT GLUCOSE: 125 MG/DL (ref 70–110)
POCT GLUCOSE: 129 MG/DL (ref 70–110)
POCT GLUCOSE: 160 MG/DL (ref 70–110)
POTASSIUM SERPL-SCNC: 3.8 MMOL/L (ref 3.5–5.1)
RBC # BLD AUTO: 3.81 M/UL (ref 4–5.4)
SODIUM SERPL-SCNC: 133 MMOL/L (ref 136–145)
VANCOMYCIN SERPL-MCNC: 20.3 UG/ML
WBC # BLD AUTO: 4.61 K/UL (ref 3.9–12.7)

## 2024-04-18 PROCEDURE — 97530 THERAPEUTIC ACTIVITIES: CPT | Mod: CQ

## 2024-04-18 PROCEDURE — 25000003 PHARM REV CODE 250: Performed by: HOSPITALIST

## 2024-04-18 PROCEDURE — 11000001 HC ACUTE MED/SURG PRIVATE ROOM

## 2024-04-18 PROCEDURE — 80048 BASIC METABOLIC PNL TOTAL CA: CPT | Performed by: HOSPITALIST

## 2024-04-18 PROCEDURE — 80202 ASSAY OF VANCOMYCIN: CPT | Performed by: HOSPITALIST

## 2024-04-18 PROCEDURE — 97110 THERAPEUTIC EXERCISES: CPT | Mod: CQ

## 2024-04-18 PROCEDURE — 99232 SBSQ HOSP IP/OBS MODERATE 35: CPT | Mod: GW,HPC,, | Performed by: STUDENT IN AN ORGANIZED HEALTH CARE EDUCATION/TRAINING PROGRAM

## 2024-04-18 PROCEDURE — S5010 5% DEXTROSE AND 0.45% SALINE: HCPCS | Performed by: HOSPITALIST

## 2024-04-18 PROCEDURE — 85025 COMPLETE CBC W/AUTO DIFF WBC: CPT | Performed by: HOSPITALIST

## 2024-04-18 PROCEDURE — 97530 THERAPEUTIC ACTIVITIES: CPT

## 2024-04-18 PROCEDURE — 97535 SELF CARE MNGMENT TRAINING: CPT

## 2024-04-18 PROCEDURE — 36415 COLL VENOUS BLD VENIPUNCTURE: CPT | Performed by: HOSPITALIST

## 2024-04-18 RX ADMIN — METOPROLOL TARTRATE 25 MG: 25 TABLET, FILM COATED ORAL at 08:04

## 2024-04-18 RX ADMIN — APIXABAN 2.5 MG: 2.5 TABLET, FILM COATED ORAL at 09:04

## 2024-04-18 RX ADMIN — METOPROLOL TARTRATE 25 MG: 25 TABLET, FILM COATED ORAL at 09:04

## 2024-04-18 RX ADMIN — ATORVASTATIN CALCIUM 40 MG: 40 TABLET, FILM COATED ORAL at 09:04

## 2024-04-18 RX ADMIN — FAMOTIDINE 20 MG: 20 TABLET ORAL at 09:04

## 2024-04-18 RX ADMIN — LEVOTHYROXINE SODIUM 75 MCG: 75 TABLET ORAL at 06:04

## 2024-04-18 RX ADMIN — MUPIROCIN: 20 OINTMENT TOPICAL at 09:04

## 2024-04-18 RX ADMIN — ASPIRIN 81 MG: 81 TABLET, COATED ORAL at 09:04

## 2024-04-18 RX ADMIN — APIXABAN 2.5 MG: 2.5 TABLET, FILM COATED ORAL at 08:04

## 2024-04-18 RX ADMIN — DEXTROSE AND SODIUM CHLORIDE: 5; 450 INJECTION, SOLUTION INTRAVENOUS at 01:04

## 2024-04-18 NOTE — NURSING
[facility]  Nurses Note -- 4 Eyes      4/17/2024       Skin assessed on: Q Shift      [] No Pressure Injuries Present    []Prevention Measures Documented    [x] Yes LDA  for Pressure Injury Previously documented     [] Yes New Pressure Injury Discovered   [] LDA for New Pressure Injury Added      Attending RN:  Michelle Stock, FELIPE     Second RN:  Catherine Napoles RN

## 2024-04-18 NOTE — PLAN OF CARE
Problem: Adult Inpatient Plan of Care  Goal: Plan of Care Review  Outcome: Ongoing, Progressing  Goal: Patient-Specific Goal (Individualized)  Outcome: Ongoing, Progressing  Goal: Absence of Hospital-Acquired Illness or Injury  Outcome: Ongoing, Progressing  Goal: Optimal Comfort and Wellbeing  Outcome: Ongoing, Progressing  Goal: Readiness for Transition of Care  Outcome: Ongoing, Progressing     Problem: Glycemic Control Impaired (Sepsis/Septic Shock)  Goal: Blood Glucose Level Within Desired Range  Outcome: Ongoing, Progressing

## 2024-04-18 NOTE — PROGRESS NOTES
[facility] - Telemetry  Neurology  Progress Note    Patient Name: Eva Bloom  MRN: [id number]  Admission Date: 4/13/2024  Hospital Length of Stay: 5 days  Code Status: Full Code   Attending Provider: José Miguel Cristina, *  Primary Care Physician: Sowmya Mccain MD   Principal Problem:Sepsis    HPI:   Ms. Bloom is a 58 year old female, with medical history of Afib, CHF NYHA class II, chronic, systolic, CVA, ESRD on dialysis (Tues, Thurs, Sat), HTN, Hx of kidney cancer, hyperparathyroidism, myocardiopathy, and prediabetes with admission concerns of sepsis. Hospital course complicated by reduced level of consciousness / worsening encephalopathy. And neurology has been consulted for the same.     History from medical records review. As per report - was noted to be lethargic / with reduced follow of commands and level of awareness. No reported clinical seizures or complex migranines. No focal motor or cranial nerve deficits. CT head negative for acute findings / CTA was suboptimal however negative for any obvious LVO.     Improvement in overall level of alertness and consciousness over the ICU stay. Awake and alert / still remains encephalopathic - less participatory to orientation questions / marjorly non verbal - no focal motor or cranial nerve deficits.     Overview/Hospital Course:  No notes on file      Interval history:     Remains encephalopathic / drowsy - opens eyes to verbal stimuli / following simple commands intermittently / oriented to person but less participatory for extended interactions / no focal motor or cranial nerve deficits        Past Medical History:   Diagnosis Date    Anemia     Anticoagulant long-term use     Atrial fibrillation     Bronchitis 03/01/2017    Cancer 2016    kidney cancer    CHF (congestive heart failure), NYHA class II, chronic, systolic     CMV (cytomegalovirus) antibody positive     CVA (cerebral vascular accident) 1/3/2024    Encounter for blood transfusion     ESRD  Patient asking for a bridge prescription for tylenol #3 until office opens on 1/16/23.  Advised that patient's PCP is not on call and controlled substances are not prescribed after hours.  Advised ER.  Patient declines and says will wait until office reopens.  Also offered Walk in Clinic in the morning.    Reason for Disposition  • Prescription request for new medicine (not a refill)     Requesting refill of tylenol #3.  Requires refill from PCP.    Protocols used: MEDICATION QUESTION CALL-A-       (end stage renal disease)     Essential hypertension 09/23/2015    H/O herpes simplex type 2 infection     Herpes simplex type 1 antibody positive     History of kidney cancer     s/p left nephrectomy 1/2016    Hyperparathyroidism, unspecified     Hyperuricemia without signs of inflammatory arthritis and tophaceous disease     Kidney stones     LGSIL (low grade squamous intraepithelial dysplasia)     Myocardiopathy 07/21/2017    Prediabetes     Proteinuria     Renal disorder     Thyroid nodule     Urate nephropathy        Past Surgical History:   Procedure Laterality Date    BREAST CYST EXCISION      COLONOSCOPY N/A 11/12/2015    COLONOSCOPY N/A 03/12/2021    Procedure: COLONOSCOPY;  Surgeon: Brendon Lanier MD;  Location: [facility] ENDO;  Service: Endoscopy;  Laterality: N/A;  covid test 3/9, labs prior, prep instr mailed -ml    EXCISION OF ARTERIOVENOUS FISTULA Left 09/27/2023    Procedure: EXCISION, AV FISTULA;  Surgeon: FARIDEH Muñoz III, MD;  Location: [facility] OR [address] [place] [address];  Service: Vascular;  Laterality: Left;  LUE AV graft excision    INSERTION OF BIVENTRICULAR IMPLANTABLE CARDIOVERTER-DEFIBRILLATOR (ICD)  04/2021    INSERTION OF TUNNELED CENTRAL VENOUS CATHETER (CVC) WITH SUBCUTANEOUS PORT N/A 01/25/2023    Procedure: INSERTION, DUAL LUMEN CATHETER WITH PORT, WITH IMAGING GUIDANCE;  Surgeon: FARIDEH Muñoz III, MD;  [facility];  Service: Peripheral Vascular;  Laterality: N/A;  possinble permcath placment    NEPHRECTOMY-LAPAROSCOPIC Left 01/12/2016    PERCUTANEOUS TRANSLUMINAL ANGIOPLASTY OF ARTERIOVENOUS FISTULA Left 04/19/2023    Procedure: PTA, AV FISTULA;  Surgeon: FARIDEH Muñoz III, MD;  Location: [facility] CATH LAB;  Service: Peripheral Vascular;  Laterality: Left;    PERITONEAL CATHETER INSERTION      Permacath insertion  01/12/2017    PLACEMENT OF ARTERIOVENOUS GRAFT  12/28/2022    Procedure: INSERTION, GRAFT, ARTERIOVENOUS;  Surgeon: FARIDEH Muñoz III, MD;  Location: [facility] OR [address] [place] [address];   Service: Peripheral Vascular;;    REMOVAL, GRAFT, ARTERIOVENOUS, UPPER EXTREMITY Left 01/25/2023    Procedure: REMOVAL, GRAFT, ARTERIOVENOUS, UPPER EXTREMITY;  Surgeon: FARIDEH Muñoz III, MD;  Location: [facility] OR 2ND FLR;  Service: Peripheral Vascular;  Laterality: Left;    REVISION OF ARTERIOVENOUS FISTULA Left 05/01/2023    Procedure: REVISION, AV FISTULA;  Surgeon: FARIDEH Muñoz III, MD;  Location: [facility] OR 2ND FLR;  Service: Peripheral Vascular;  Laterality: Left;  LUE AVG revision    REVISION OF PROCEDURE INVOLVING ARTERIOVENOUS GRAFT Left 12/28/2022    Procedure: REVISION, PROCEDURE INVOLVING ARTERIOVENOUS GRAFT;  Surgeon: FARIDEH Muñoz III, MD;  Location: [facility] OR 2ND FLR;  Service: Peripheral Vascular;  Laterality: Left;    REVISION OF PROCEDURE INVOLVING ARTERIOVENOUS GRAFT Left 07/21/2023    Procedure: LEFT ARM ARTERIOVENOUS GRAFT EXCISION  AND LIGATION;  Surgeon: Obi Werner MD;  Location: [facility] OR;  Service: Vascular;  Laterality: Left;  Left AVG excision and revision with interposition    SALPINGOOPHORECTOMY Right 2016    KJB---DAVINCI    TONSILLECTOMY      TUBAL LIGATION         Review of patient's allergies indicates:   Allergen Reactions    Carvedilol Other (See Comments)     Nausea/vomiting    Allopurinol      Other reaction(s): abnormal transaminases       Current Neurological Medications:     Current Facility-Administered Medications   Medication Dose Route Frequency Provider Last Rate Last Admin    acetaminophen tablet 650 mg  650 mg Oral Q6H PRN José Miguel Cristina MD        apixaban tablet 2.5 mg  2.5 mg Oral BID José Miguel Cristina MD   2.5 mg at 04/18/24 0953    aspirin EC tablet 81 mg  81 mg Oral Daily José Miguel Cristina MD   81 mg at 04/18/24 0957    atorvastatin tablet 40 mg  40 mg Oral Daily José Miguel Cristina MD   40 mg at 04/18/24 0953    dextrose 10% bolus 125 mL 125 mL  12.5 g Intravenous PRN José Miguel Cristina MD        dextrose 10% bolus 250 mL  250 mL  25 g Intravenous PRN José Miguel Cristina MD        dextrose 5 % and 0.45 % NaCl infusion   Intravenous Continuous José Miguel Cristina MD 50 mL/hr at 04/17/24 1805 New Bag at 04/17/24 1805    famotidine tablet 20 mg  20 mg Oral Daily José Miguel Cristina MD   20 mg at 04/18/24 0954    glucagon (human recombinant) injection 1 mg  1 mg Intramuscular PRN José Miguel Cristina MD        glucose chewable tablet 16 g  16 g Oral PRN José Miguel Cristina MD        glucose chewable tablet 24 g  24 g Oral PRN José Miguel Cristina MD        levothyroxine tablet 75 mcg  75 mcg Oral Before breakfast José Miguel Cristina MD   75 mcg at 04/18/24 0645    metoprolol tartrate (LOPRESSOR) tablet 25 mg  25 mg Oral BID José Miguel Cristina MD   25 mg at 04/18/24 0954    mupirocin 2 % ointment   Nasal BID José Miguel Cristina MD   Given at 04/18/24 0956     Facility-Administered Medications Ordered in Other Encounters   Medication Dose Route Frequency Provider Last Rate Last Admin    0.9%  NaCl infusion   Intravenous Continuous Soni Bhatti MD   New Bag at 07/21/23 1116     Family History       Problem Relation (Age of Onset)    Cataracts Maternal Aunt    Diabetes Father, Maternal Grandfather    Heart disease Sister    Hypertension Mother    Kidney disease Father    No Known Problems Sister, Sister, Brother, Brother, Maternal Uncle, Paternal Aunt, Paternal Uncle, Maternal Grandmother, Paternal Grandmother, Paternal Grandfather, Son, Son, Other          Tobacco Use    Smoking status: Never     Passive exposure: Never    Smokeless tobacco: Never   Substance and Sexual Activity    Alcohol use: No    Drug use: Yes     Types: Hydrocodone    Sexual activity: Not Currently     Review of Systems   Unable to perform ROS: Acuity of condition   Neurological:  Negative for seizures, facial asymmetry and weakness.   Psychiatric/Behavioral:  Positive for confusion. Negative for agitation.      Objective:     Vital  "Signs (Most Recent):  Temp: 98.5 °F (36.9 °C) (04/18/24 0711)  Pulse: 93 (04/18/24 0717)  Resp: 18 (04/18/24 0711)  BP: 135/85 (04/18/24 0954)  SpO2: (!) 93 % (04/18/24 0711) Vital Signs (24h Range):  Temp:  [86.3 °F (30.2 °C)-98.5 °F (36.9 °C)] 98.5 °F (36.9 °C)  Pulse:  [68-96] 93  Resp:  [16-21] 18  SpO2:  [93 %-99 %] 93 %  BP: (100-136)/(65-92) 135/85     Weight: 48.7 kg (107 lb 5.8 oz)  Body mass index is 18.43 kg/m².     Physical Exam  HENT:      Head: Normocephalic and atraumatic.   Eyes:      Extraocular Movements: Extraocular movements intact and EOM normal.   Pulmonary:      Effort: No respiratory distress.   Neurological:      Mental Status: She is alert.          NEUROLOGICAL EXAMINATION:     MENTAL STATUS   Level of consciousness: alert       Not following commands   Non verbal majorly / less participatory      CRANIAL NERVES     CN III, IV, VI   Extraocular motions are normal.   Nystagmus: none   Ophthalmoparesis: none    CN VII   Facial expression full, symmetric.     MOTOR EXAM        No new focal motor deficits       SENSORY EXAM        No new focal sensory deficits       GAIT AND COORDINATION     Tremor   Resting tremor: absent      Significant Labs: Hemoglobin A1c: No results for input(s): "HGBA1C" in the last 720 hours.  Blood Culture: No results for input(s): "LABBLOO" in the last 48 hours.  BMP:   Recent Labs   Lab 04/17/24  0506 04/18/24 0447    101    133*   K 4.4 3.8   CL 96 98   CO2 18* 20*   BUN 34* 16   CREATININE 5.6* 3.8*   CALCIUM 10.5 10.4     CBC:   Recent Labs   Lab 04/18/24 0447   WBC 4.61   HGB 10.7*   HCT 35.6*   *     CMP:   Recent Labs   Lab 04/17/24  0506 04/18/24 0447    101    133*   K 4.4 3.8   CL 96 98   CO2 18* 20*   BUN 34* 16   CREATININE 5.6* 3.8*   CALCIUM 10.5 10.4   ANIONGAP 22* 15     CSF Culture: No results for input(s): "CSFCULTURE" in the last 48 hours.  CSF Studies: No results for input(s): "ALIQUT", "APPEARCSF", "COLORCSF", " ""CSFWBC", "CSFRBC", "GLUCCSF", "LDHCSF", "PROTEINCSF", "VDRLCSF" in the last 48 hours.  Inflammatory Markers:   Recent Labs   Lab 04/17/24  0918   SEDRATE 17     POCT Glucose:   Recent Labs   Lab 04/17/24  1540 04/17/24 2123 04/18/24  0710   POCTGLUCOSE 80 102 114*     Prealbumin: No results for input(s): "PREALBUMIN" in the last 48 hours.  Respiratory Culture: No results for input(s): "GSRESP", "RESPIRATORYC" in the last 48 hours.  Urine Culture: No results for input(s): "LABURIN" in the last 48 hours.  Urine Studies: No results for input(s): "COLORU", "APPEARANCEUA", "PHUR", "SPECGRAV", "PROTEINUA", "GLUCUA", "KETONESU", "BILIRUBINUA", "OCCULTUA", "NITRITE", "UROBILINOGEN", "LEUKOCYTESUR", "RBCUA", "WBCUA", "BACTERIA", "SQUAMEPITHEL", "HYALINECASTS" in the last 48 hours.    Invalid input(s): "WRIGHTSUR"  Recent Lab Results         04/18/24  0710   04/18/24  0447   04/17/24 2123 04/17/24  1540        Anion Gap   15           Baso #   0.06           Basophil %   1.3           BUN   16           Calcium   10.4           Chloride   98           CO2   20           Creatinine   3.8           Differential Method   Automated           eGFR   13           Eos #   0.1           Eos %   2.2           Glucose   101           Gran # (ANC)   3.2           Gran %   69.4           Hematocrit   35.6           Hemoglobin   10.7           Immature Grans (Abs)   0.03  Comment: Mild elevation in immature granulocytes is non specific and   can be seen in a variety of conditions including stress response,   acute inflammation, trauma and pregnancy. Correlation with other   laboratory and clinical findings is essential.             Immature Granulocytes   0.7           Lymph #   0.7           Lymph %   15.8           MCH   28.1           MCHC   30.1           MCV   93           Mono #   0.5           Mono %   10.6           MPV   12.5           nRBC   2           Platelet Count   131           POCT Glucose 114     102   80       " Potassium   3.8           RBC   3.81           RDW   21.7           Sodium   133           Vancomycin, Random   20.3           WBC   4.61                 All pertinent lab results from the past 24 hours have been reviewed.    Significant Imaging: I have reviewed and interpreted all pertinent imaging results/findings within the past 24 hours.  Assessment and Plan:     Acute metabolic encephalopathy  Ms. Bloom is a 58 year old female, with medical history of Afib, CHF NYHA class II, chronic, systolic, CVA, ESRD on dialysis (Tues, Thurs, Sat), HTN, Hx of kidney cancer, hyperparathyroidism, myocardiopathy, and prediabetes with admission concerns of sepsis. Hospital course complicated by reduced level of consciousness / worsening encephalopathy. And neurology has been consulted for the same.      History from medical records review. As per report - was noted to be lethargic / with reduced follow of commands and level of awareness. No reported clinical seizures or complex migranines. No focal motor or cranial nerve deficits. CT head negative for acute findings / CTA was suboptimal however negative for any obvious LVO.      Improvement in overall level of alertness and consciousness over the ICU stay. Awake and alert / still remains encephalopathic - less participatory to orientation questions / marjorly non verbal - no focal motor or cranial nerve deficits.      4/17: Not seen today / in dialysis. Per report - improvement in overall trend of encephalopathy / yet with fluctuations.   EEG: slowing and disorganization along with frequent triphasic waves is suggestive of a generalized metabolic encephalopathy.  There is no evidence of an epileptic process OR seizures    MRI Brain: ICD contraindication     4/18: Remains encephalopathic / drowsy - opens eyes to verbal stimuli / following simple commands intermittently / oriented to person but less participatory for extended interactions / no focal motor or cranial nerve  deficits       Recs:   Encephalopathy - suspect metabolic / systemic infectious with confounders of hypoactive delirium. Low suspicion for focal cerebrovascular ischemic event or epileptic or neuro inflammatory etiology      - continue AC if no absolute contraindications   - repeat CT head if any new focal deficits       Continue Encephalopathy evaluation and management  -- Correct lytes as needed / investigate and control infection if any / avoid hypoxia or hypercapnia / avoid hypoglycemia / control uremia - avoid rapid correction / avoid-correct metabolic-respiratory acidosis or alkalosis   -- Correct any nutritional deficiencies / correct anemia / provide nutritional support as appropriate / ambulate when appropriate   -- PT/OT evaluation and management      Delirium precautions    - Recommend PRN depacon 250 mg IV qam and 500 mg IV qhs for management of agitation and behavioral disturbances associated with delirium.  - Recommend Zyprexa 2.5 mg PO/IM q6h PRN severe, nonredirectable agitation.  Max dose is 30 mg daily from all sources.  Parenteral Zyprexa is not to be given within 1 hours of parenteral benzodiazepines including Ativan.    - Recommend standard delirium precautions:               - Avoid use of physical restraints and judiciously use chemical sedatives for management of agitation              - Avoid deliriogenic agents if possible including benzodiazepines, anticholinergics, and opiates              - Normalize patient's sleep-wake cycle if possible              - Environmental adjustments to include bright lighting and windows open during the day              - Early involvement in PT/OT              - Recommend physical sitter to be present at bedside.        VTE Risk Mitigation (From admission, onward)           Ordered     apixaban tablet 2.5 mg  2 times daily         04/17/24 1865                    Patria Newton MD  Neurology  [facility] - Telemetry

## 2024-04-18 NOTE — PT/OT/SLP PROGRESS
Physical Therapy Treatment    Patient Name:  Eva Bloom   MRN:  [id number]    Recommendations:     Discharge Recommendations: Moderate Intensity Therapy  Discharge Equipment Recommendations: none  Barriers to discharge: None    Assessment:     Eva Bloom is a 58 y.o. female admitted with a medical diagnosis of Sepsis.  She presents with the following impairments/functional limitations: weakness, impaired endurance, impaired functional mobility, impaired self care skills, gait instability, decreased lower extremity function, decreased upper extremity function, impaired coordination, decreased coordination, decreased ROM, impaired balance, decreased safety awareness, pain, edema, impaired cognition .    Rehab Prognosis: Fair; patient would benefit from acute skilled PT services to address these deficits and reach maximum level of function.    Recent Surgery: * No surgery found *      Plan:     During this hospitalization, patient to be seen 5 x/week to address the identified rehab impairments via gait training, therapeutic activities, therapeutic exercises, wheelchair management/training and progress toward the following goals:    Plan of Care Expires:  04/30/24    Subjective     Chief Complaint: pt is still with very limited verbal communication .   Patient/Family Comments/goals: pt  lethargic , falling sleep during therapy . Spouse presents throughout treatment .  Pain/Comfort:  Pain Rating 1: 0/10  Pain Rating Post-Intervention 1: 0/10      Objective:     Communicated with nurse  prior to session.  Patient found HOB elevated with telemetry, bed alarm, peripheral IV, PureWick upon PT entry to room.     General Precautions: Standard, fall, diabetic  Orthopedic Precautions: N/A  Braces: N/A  Respiratory Status: Room air     Functional Mobility:  Bed Mobility:     Rolling Left:  dependence and of 2 persons  Scooting: dependence and of 2 persons  Supine to Sit: dependence and of 2 persons  Sit to Supine:  dependence and of 2 persons  Transfers:     Sit to Stand:  dependence and of 2 persons with hand-held assist  Balance: poor in sitting and standing (pt with L lateral leaning, drooling )       AM-PAC 6 CLICK MOBILITY  Turning over in bed (including adjusting bedclothes, sheets and blankets)?: 2  Sitting down on and standing up from a chair with arms (e.g., wheelchair, bedside commode, etc.): 2  Moving from lying on back to sitting on the side of the bed?: 2  Moving to and from a bed to a chair (including a wheelchair)?: 2  Need to walk in hospital room?: 1  Climbing 3-5 steps with a railing?: 1  Basic Mobility Total Score: 10       Treatment & Education:  Performed PROM BLE x 10 reps each in all available planes, pt unable to follow command nor maintain level of alertness to actively participate .    Patient left with bed in chair position with BUE elevated, heels offloading all lines intact, call button in reach, bed alarm on, PCT  notified, and spouse /Avasys  present..    GOALS:   Multidisciplinary Problems       Physical Therapy Goals          Problem: Physical Therapy    Goal Priority Disciplines Outcome Goal Variances Interventions   Physical Therapy Goal     PT, PT/OT Ongoing, Progressing     Description: Goals to be met by: 24     Patient will increase functional independence with mobility by performin. Supine to sit with Minimal Assistance  2. Rolling to Left and Right with Minimal Assistance  3. Sit to stand transfer with Minimal Assistance using RW  4. Bed to chair transfer with Minimal Assistance using Rolling Walker  5. Gait x50 feet with Minimal Assistance using Rolling Walker   6. Wheelchair propulsion x100 feet with Minimal Assistance using bilateral upper extremities  7. Lower extremity exercise program 2 sets x10 reps per handout, with independence                         Time Tracking:     PT Received On: 24  PT Start Time: 1558     PT Stop Time: 1625  PT Total Time (min): 27 min      Billable Minutes: Therapeutic Activity 15, Therapeutic Exercise 12 , and Total Time 27 min with OT     Treatment Type: Treatment  PT/PTA: PTA     Number of PTA visits since last PT visit: 2     04/18/2024

## 2024-04-18 NOTE — NURSING
Patient have been drowsy most of the day and wasn't able to participate in Rehab or meals.  Noted  and staff tried to feed patient at each meals and patient would only consume a little.

## 2024-04-18 NOTE — NURSING
[facility] [place]  Nurses Note -- 4 Eyes      4/18/2024       Skin assessed on: Q Shift      [] No Pressure Injuries Present    []Prevention Measures Documented    [x] Yes LDA  for Pressure Injury Previously documented     [] Yes New Pressure Injury Discovered   [] LDA for New Pressure Injury Added      Attending RN:  Catherine Napoles RN     Second RN:  Daniella Stock RN

## 2024-04-18 NOTE — PROGRESS NOTES
"alert today does not interact with me at all     Opens her eyes follows complex commands but no verbal interaction with me    HPI/ROS unable to obtain     CXR terrible cardiomegaly     Lactic acidosis noted tx with hd last lv 2.1     Hypercalcemia noted 11.5 alb 3.5     at bedside He tells me that she has hx of cancer was on "pills". Her Dr name is Miguel    EEG with diffuse waves    Past Medical History:   Diagnosis Date    Anemia     Anticoagulant long-term use     Atrial fibrillation     Bronchitis 03/01/2017    Cancer 2016    kidney cancer    CHF (congestive heart failure), NYHA class II, chronic, systolic     CMV (cytomegalovirus) antibody positive     CVA (cerebral vascular accident) 1/3/2024    Encounter for blood transfusion     ESRD (end stage renal disease)     Essential hypertension 09/23/2015    H/O herpes simplex type 2 infection     Herpes simplex type 1 antibody positive     History of kidney cancer     s/p left nephrectomy 1/2016    Hyperparathyroidism, unspecified     Hyperuricemia without signs of inflammatory arthritis and tophaceous disease     Kidney stones     LGSIL (low grade squamous intraepithelial dysplasia)     Myocardiopathy 07/21/2017    Prediabetes     Proteinuria     Renal disorder     Thyroid nodule     Urate nephropathy      Past Surgical History:   Procedure Laterality Date    BREAST CYST EXCISION      COLONOSCOPY N/A 11/12/2015    COLONOSCOPY N/A 03/12/2021    Procedure: COLONOSCOPY;  Surgeon: Brendon Lanier MD;  Location: [facility];  Service: Endoscopy;  Laterality: N/A;  covid test 3/9, labs prior, prep instr mailed -ml    EXCISION OF ARTERIOVENOUS FISTULA Left 09/27/2023    Procedure: EXCISION, AV FISTULA;  Surgeon: FARIDEH Muñoz III, MD;  Location: [facility] OR [address] [place] [address];  Service: Vascular;  Laterality: Left;  LUE AV graft excision    INSERTION OF BIVENTRICULAR IMPLANTABLE CARDIOVERTER-DEFIBRILLATOR (ICD)  04/2021    INSERTION OF TUNNELED CENTRAL VENOUS CATHETER (CVC) " WITH SUBCUTANEOUS PORT N/A 01/25/2023    Procedure: INSERTION, DUAL LUMEN CATHETER WITH PORT, WITH IMAGING GUIDANCE;  Surgeon: FARIDEH Muñoz III, MD;  Location: [facility] OR [facility] FLR;  Service: Peripheral Vascular;  Laterality: N/A;  possinble permcath placment    NEPHRECTOMY-LAPAROSCOPIC Left 01/12/2016    PERCUTANEOUS TRANSLUMINAL ANGIOPLASTY OF ARTERIOVENOUS FISTULA Left 04/19/2023    Procedure: PTA, AV FISTULA;  Surgeon: FARIDEH Muñoz III, MD;  Location: [facility] CATH LAB;  Service: Peripheral Vascular;  Laterality: Left;    PERITONEAL CATHETER INSERTION      Permacath insertion  01/12/2017    PLACEMENT OF ARTERIOVENOUS GRAFT  12/28/2022    Procedure: INSERTION, GRAFT, ARTERIOVENOUS;  Surgeon: FARIDEH Muñoz III, MD;  [facility];  Service: Peripheral Vascular;;    REMOVAL, GRAFT, ARTERIOVENOUS, UPPER EXTREMITY Left 01/25/2023    Procedure: REMOVAL, GRAFT, ARTERIOVENOUS, UPPER EXTREMITY;  Surgeon: FARIDEH Muñoz III, MD;  [facility];  Service: Peripheral Vascular;  Laterality: Left;    REVISION OF ARTERIOVENOUS FISTULA Left 05/01/2023    Procedure: REVISION, AV FISTULA;  Surgeon: FARIDEH Muñoz III, MD;  Location: [facility] OR [facility] FLR;  Service: Peripheral Vascular;  Laterality: Left;  LUE AVG revision    REVISION OF PROCEDURE INVOLVING ARTERIOVENOUS GRAFT Left 12/28/2022    Procedure: REVISION, PROCEDURE INVOLVING ARTERIOVENOUS GRAFT;  Surgeon: FARIDEH Muñoz III, MD;  [facility];  Service: Peripheral Vascular;  Laterality: Left;    REVISION OF PROCEDURE INVOLVING ARTERIOVENOUS GRAFT Left 07/21/2023    Procedure: LEFT ARM ARTERIOVENOUS GRAFT EXCISION  AND LIGATION;  Surgeon: Obi Werner MD;  Location: [facility] OR;  Service: Vascular;  Laterality: Left;  Left AVG excision and revision with interposition    SALPINGOOPHORECTOMY Right 2016    KJB---DAVINCI    TONSILLECTOMY      TUBAL LIGATION       Review of patient's allergies indicates:   Allergen Reactions     Carvedilol Other (See Comments)     Nausea/vomiting    Allopurinol      Other reaction(s): abnormal transaminases       Current Facility-Administered Medications   Medication Dose Route Frequency Provider Last Rate Last Admin    acetaminophen tablet 650 mg  650 mg Oral Q6H PRN José Miguel Cristina MD        apixaban tablet 2.5 mg  2.5 mg Oral BID José iMguel Cristina MD   2.5 mg at 04/18/24 0953    aspirin EC tablet 81 mg  81 mg Oral Daily José Miguel Cristina MD   81 mg at 04/18/24 0957    atorvastatin tablet 40 mg  40 mg Oral Daily José Miguel Cristina MD   40 mg at 04/18/24 0953    dextrose 10% bolus 125 mL 125 mL  12.5 g Intravenous PRN José Miguel Cristina MD        dextrose 10% bolus 250 mL 250 mL  25 g Intravenous PRN José Miguel Cristina MD        dextrose 5 % and 0.45 % NaCl infusion   Intravenous Continuous José Miguel Cristina MD 50 mL/hr at 04/17/24 1805 New Bag at 04/17/24 1805    famotidine tablet 20 mg  20 mg Oral Daily José Miguel Cristina MD   20 mg at 04/18/24 0954    glucagon (human recombinant) injection 1 mg  1 mg Intramuscular PRN José Miguel Cristina MD        glucose chewable tablet 16 g  16 g Oral PRN José Miguel Cristina MD        glucose chewable tablet 24 g  24 g Oral PRN José Miguel Cristina MD        levothyroxine tablet 75 mcg  75 mcg Oral Before breakfast José Miguel Cristina MD   75 mcg at 04/18/24 0645    metoprolol tartrate (LOPRESSOR) tablet 25 mg  25 mg Oral BID José Miguel Cristina MD   25 mg at 04/18/24 0954    mupirocin 2 % ointment   Nasal BID José Miguel Cristina MD   Given at 04/18/24 0956     Facility-Administered Medications Ordered in Other Encounters   Medication Dose Route Frequency Provider Last Rate Last Admin    0.9%  NaCl infusion   Intravenous Continuous Soni Bhatti MD   New Bag at 07/21/23 1116   ECho 3/23/      Show Result ComparisonThe left ventricle is normal in size with mild eccentric hypertrophy and severely  decreased systolic function.  The estimated ejection fraction is 23%.  There is severe left ventricular global hypokinesis.  Grade II left ventricular diastolic dysfunction.  Moderate left atrial enlargement.  Normal right ventricular size with normal right ventricular systolic function.  Moderate right atrial enlargement.  There is mild aortic valve stenosis.  Aortic valve area is 1.58 cm2; peak velocity is 2.01 m/s; mean gradient is 8 mmHg.  Moderate mitral regurgitation.  Moderate to severe tricuspid regurgitation.  Mild pulmonic regurgitation.  Elevated central venous pressure (15 mmHg).  The estimated PA systolic pressure is 47 mmHg.  There is mild-moderate pulmonary hypertension.  Moderate posterolateral pericardial effusion. Trivial under the RA    LABS    Recent Results (from the past 24 hour(s))   POCT glucose    Collection Time: 04/17/24  3:40 PM   Result Value Ref Range    POCT Glucose 80 70 - 110 mg/dL   POCT glucose    Collection Time: 04/17/24  9:23 PM   Result Value Ref Range    POCT Glucose 102 70 - 110 mg/dL   Vancomycin, Random    Collection Time: 04/18/24  4:47 AM   Result Value Ref Range    Vancomycin, Random 20.3 Not established ug/mL   CBC Auto Differential    Collection Time: 04/18/24  4:47 AM   Result Value Ref Range    WBC 4.61 3.90 - 12.70 K/uL    RBC 3.81 (L) 4.00 - 5.40 M/uL    Hemoglobin 10.7 (L) 12.0 - 16.0 g/dL    Hematocrit 35.6 (L) 37.0 - 48.5 %    MCV 93 82 - 98 fL    MCH 28.1 27.0 - 31.0 pg    MCHC 30.1 (L) 32.0 - 36.0 g/dL    RDW 21.7 (H) 11.5 - 14.5 %    Platelets 131 (L) 150 - 450 K/uL    MPV 12.5 9.2 - 12.9 fL    Immature Granulocytes 0.7 (H) 0.0 - 0.5 %    Gran # (ANC) 3.2 1.8 - 7.7 K/uL    Immature Grans (Abs) 0.03 0.00 - 0.04 K/uL    Lymph # 0.7 (L) 1.0 - 4.8 K/uL    Mono # 0.5 0.3 - 1.0 K/uL    Eos # 0.1 0.0 - 0.5 K/uL    Baso # 0.06 0.00 - 0.20 K/uL    nRBC 2 (A) 0 /100 WBC    Gran % 69.4 38.0 - 73.0 %    Lymph % 15.8 (L) 18.0 - 48.0 %    Mono % 10.6 4.0 - 15.0 %     "Eosinophil % 2.2 0.0 - 8.0 %    Basophil % 1.3 0.0 - 1.9 %    Differential Method Automated    Basic Metabolic Panel    Collection Time: 04/18/24  4:47 AM   Result Value Ref Range    Sodium 133 (L) 136 - 145 mmol/L    Potassium 3.8 3.5 - 5.1 mmol/L    Chloride 98 95 - 110 mmol/L    CO2 20 (L) 23 - 29 mmol/L    Glucose 101 70 - 110 mg/dL    BUN 16 6 - 20 mg/dL    Creatinine 3.8 (H) 0.5 - 1.4 mg/dL    Calcium 10.4 8.7 - 10.5 mg/dL    Anion Gap 15 8 - 16 mmol/L    eGFR 13 (A) >60 mL/min/1.73 m^2   POCT glucose    Collection Time: 04/18/24  7:10 AM   Result Value Ref Range    POCT Glucose 114 (H) 70 - 110 mg/dL   ]    I/O last 3 completed shifts:  In: 240 [P.O.:240]  Out: 504 [Other:500; Stool:4]    Vitals:    04/18/24 0506 04/18/24 0711 04/18/24 0717 04/18/24 0954   BP: 128/83 135/85  135/85   Pulse: 83 88 93    Resp: 20 18     Temp: (!) 86.3 °F (30.2 °C) 98.5 °F (36.9 °C)     TempSrc: Oral Oral     SpO2: 98% (!) 93%     Weight:       Height:           Pos Jvd, No Thyromegaly or Lymphadenopathy  Lungs: Fairly clear anteriorly and laterally  Cor: RRR no G or rubs  Abd: Soft benign good bowel sounds non tender  Ext: No E C C    A)    ESRD x 8 y Normal HD TTS this week mwf seems to be well dialyzed   AMS with diffuse slow eeg waves  Hypoglycemia ( in esrd patient might be an ominous sign)   Anemia   Hx of cva ? New CVA  Hx of prediabetes  Hypercalcemia ( mild (<12)   Elevated total protein     P)    Renal Diet when able   Home meds when able  Protect access  HD might need hd low intensity today  EPO prn   Binders prn   Adjust all meds to the degree of renal fx  Close follow up I/O and weights  Maintain Hydration     Tx for hypercalcemia some times include IVF, biphosphonate and calcitonin. Of those 3 calcitonin is most "kidney friendly patient". The casue for Hypercalcemia might be related to her cancer        "

## 2024-04-18 NOTE — PT/OT/SLP PROGRESS
Occupational Therapy   Treatment    Name: Eva Bloom  MRN: [id number]  Admitting Diagnosis:  Sepsis       Recommendations:     Discharge Recommendations: Moderate Intensity Therapy  Discharge Equipment Recommendations:  none  Barriers to discharge:   (dependent x2 person for all mobility, Dep for self )    Assessment:     Eva Bloom is a 58 y.o. female with a medical diagnosis of Sepsis.   Performance deficits affecting function are weakness, impaired endurance, impaired self care skills, impaired functional mobility, gait instability, impaired balance, impaired cognition, decreased coordination, decreased upper extremity function, decreased lower extremity function, decreased safety awareness, impaired fine motor, impaired skin.   The patient was lethargic during OT. The patient was dep x2 person for all mobility and did not follow commands to wipe the drool mouth. The patient opened eyes briefly and was mostly non-verbal. The patient's spouse was present during OT tx.     Rehab Prognosis:  Fair; patient would benefit from acute skilled OT services to address these deficits and reach maximum level of function.       Plan:     Patient to be seen  (3-5x/week) to address the above listed problems via self-care/home management, therapeutic activities, therapeutic exercises  Plan of Care Expires: 24  Plan of Care Reviewed with: patient, daughter    Subjective     Chief Complaint: none  Patient/Family Comments/goals: patient was unable to state. The spouse encouraged the patient to participate.  Pain/Comfort:  Pain Rating 1: 0/10    Objective:     Communicated with: Catherine may prior to session.  Patient found HOB elevated with telemetry, bed alarm, PureWick, peripheral IV upon OT entry to room.    General Precautions: Standard, fall, diabetic    Orthopedic Precautions:N/A  Braces: N/A  Respiratory Status: Room air     Occupational Performance:     Bed Mobility:    Patient completed Rolling/Turning  to Left with  dependent and 2 persons  Patient completed Scooting/Bridging with dependent and 2 persons  Patient completed Supine to Sit with dependent and 2 persons  Patient completed Sit to Supine with dependent and 2 persons     Functional Mobility/Transfers:  Patient completed Sit <> Stand Transfer with dependence and of 2 persons  with  hand-held assist   Functional Mobility: The patient sat on the EOB with trunk and head flexed, leaning to left and forward.     Activities of Daily Living:  Grooming: total assist to wipe drool from her mouth    Upper Body Dressing: total assistance to doff soiled gown and don clean gown while on the EOB      [facility] 6 Click ADL: 6    Treatment & Education:  ROM to BUE-the patient was able to assist with ROM to the B shoulder and grasped OT's hand  Answered spouse questions within OT scope of practice    Patient left HOB elevated with all lines intact, call button in reach, bed alarm on, spouse present, and BUE supported on pillows    GOALS:   Multidisciplinary Problems       Occupational Therapy Goals          Problem: Occupational Therapy    Goal Priority Disciplines Outcome Interventions   Occupational Therapy Goal     OT, PT/OT Ongoing, Progressing    Description: Goals to be met by: 4/30/2024     Patient will increase functional independence with ADLs by performing:    Feeding with Modified Orient and Supervision.  UE Dressing with Contact Guard Assistance.  LE Dressing with Stand-by Assistance.  Grooming while seated with Set-up Assistance and Supervision.  Toileting from bedside commode with Set-up Assistance, Stand-by Assistance, and Assistive Devices as needed for hygiene and clothing management.   Sitting at edge of bed x30 minutes with Modified Orient and Supervision.  Rolling to Bilateral with Modified Orient and Supervision.   Supine to sit with Contact Guard Assistance.  Step transfer with Contact Guard Assistance  Toilet transfer to bedside commode  with Contact Guard Assistance.  Upper extremity exercise program x10 reps per handout, with assistance as needed.                         Time Tracking:     OT Date of Treatment: 04/18/24  OT Start Time: 1558  OT Stop Time: 1624  OT Total Time (min): 26 min    Billable Minutes:Self Care/Home Management 15  Therapeutic Activity 11  Total Time 26 (with PT)    OT/CORNELL: OT          4/18/2024

## 2024-04-18 NOTE — ASSESSMENT & PLAN NOTE
Patient is lethargic today,code stroke was called,transfered to ICU,head CT show no acute process,evaluated by Tele neuro,will have CTA head,check ammonia and TSH,hypoglycemia is resolved,will monitor closely..imagines and labs show no acute process,acute metabolic encephalopathy  seems improving,consulted neurology,ordered EEG.show no seizure activity,but metabolic  encephalopathy,MRI can not be done duo to AICD.Discontinued Abx,no sign of infection is seen.

## 2024-04-18 NOTE — ASSESSMENT & PLAN NOTE
Ms. Bloom is a 58 year old female, with medical history of Afib, CHF NYHA class II, chronic, systolic, CVA, ESRD on dialysis (Tues, Thurs, Sat), HTN, Hx of kidney cancer, hyperparathyroidism, myocardiopathy, and prediabetes with admission concerns of sepsis. Hospital course complicated by reduced level of consciousness / worsening encephalopathy. And neurology has been consulted for the same.      History from medical records review. As per report - was noted to be lethargic / with reduced follow of commands and level of awareness. No reported clinical seizures or complex migranines. No focal motor or cranial nerve deficits. CT head negative for acute findings / CTA was suboptimal however negative for any obvious LVO.      Improvement in overall level of alertness and consciousness over the ICU stay. Awake and alert / still remains encephalopathic - less participatory to orientation questions / marjorly non verbal - no focal motor or cranial nerve deficits.      4/17: Not seen today / in dialysis. Per report - improvement in overall trend of encephalopathy / yet with fluctuations.   EEG: slowing and disorganization along with frequent triphasic waves is suggestive of a generalized metabolic encephalopathy.  There is no evidence of an epileptic process OR seizures    MRI Brain: ICD contraindication     4/18: Remains encephalopathic / drowsy - opens eyes to verbal stimuli / following simple commands intermittently / oriented to person but less participatory for extended interactions / no focal motor or cranial nerve deficits       Recs:   Encephalopathy - suspect metabolic / systemic infectious with confounders of hypoactive delirium. Low suspicion for focal cerebrovascular ischemic event or epileptic or neuro inflammatory etiology      - continue AC if no absolute contraindications   - repeat CT head if any new focal deficits       Continue Encephalopathy evaluation and management  -- Correct lytes as needed /  investigate and control infection if any / avoid hypoxia or hypercapnia / avoid hypoglycemia / control uremia - avoid rapid correction / avoid-correct metabolic-respiratory acidosis or alkalosis   -- Correct any nutritional deficiencies / correct anemia / provide nutritional support as appropriate / ambulate when appropriate   -- PT/OT evaluation and management      Delirium precautions    - Recommend PRN depacon 250 mg IV qam and 500 mg IV qhs for management of agitation and behavioral disturbances associated with delirium.  - Recommend Zyprexa 2.5 mg PO/IM q6h PRN severe, nonredirectable agitation.  Max dose is 30 mg daily from all sources.  Parenteral Zyprexa is not to be given within 1 hours of parenteral benzodiazepines including Ativan.    - Recommend standard delirium precautions:               - Avoid use of physical restraints and judiciously use chemical sedatives for management of agitation              - Avoid deliriogenic agents if possible including benzodiazepines, anticholinergics, and opiates              - Normalize patient's sleep-wake cycle if possible              - Environmental adjustments to include bright lighting and windows open during the day              - Early involvement in PT/OT              - Recommend physical sitter to be present at bedside.

## 2024-04-18 NOTE — PROGRESS NOTES
[facility] Medicine  Progress Note    Patient Name: Eva Bloom  MRN: [id number]  Patient Class: IP- Inpatient   Admission Date: 4/13/2024  Length of Stay: 5 days  Attending Physician: José Miguel Cristina, *  Primary Care Provider: Sowmya Mccain MD        Subjective:     Principal Problem:Sepsis        HPI:  This is a 58 year old female, with a Anemia, Atrial fibrillation, CHF NYHA class II, chronic, systolic, CVA, ESRD on dialysis (Tues, Thurs, Sat) with completion of session today, Essential HTN, Hx of kidney cancer, Hyperparathyroidism, Myocardiopathy, and Prediabetes, who presents to the ED complaining of Palpitations, symptoms onset PTA. Patient reports cough, sleep disturbance, and generalized pruritus. Patient states she was at dialysis PTA and receiving dialysis when her heart rate jumped into the 120's and the dialysis nurses instructed her to present to the ER. Patient also states she has been on dialysis for 8 years and that she does not make urine. Patient further states she is on 2.5 mg of Eliquis. Additional history obtained from independent historian: patient's  states that in July last year she had a spot on her left foot and had a line inserted. Patient denies Hx of irregular heart rate or MI. Patient also denies rhinorrhea, sore throat, abdominal pain, emesis, diarrhea, or other associated symptoms. No other alleviating or exacerbating factors. This is the extent of the patient's complaints in the ED.patient is febrile in .8,tachycardic 120.sepsis likely  duo to l;ine infection,patient has been started on broad  spectrum IV Abx,blood culture is pending.    Overview/Hospital Course:  This is a 58 year old female, with a Anemia, Atrial fibrillation, CHF NYHA class II, chronic, systolic, CVA, ESRD on dialysis (Tues, Thurs, Sat) with completion of session today, Essential HTN, Hx of kidney cancer, Hyperparathyroidism, Myocardiopathy, and Prediabetes, who  presents to the ED complaining of Palpitations, symptoms onset PTA. Patient reports cough, sleep disturbance, and generalized pruritus. Patient states she was at dialysis PTA and receiving dialysis when her heart rate jumped into the 120's and the dialysis nurses instructed her to present to the ER. .patient is febrile in .8,tachycardic 120.sepsis likely  duo to l;ine infection,patient has been started on broad  spectrum IV Abx,blood culture is pending.HR and BP is improved at this time.cultures sofar negative.  Patient is lethargic today,code stroke was called,transfered to ICU,head CT show no acute process,evaluated by Tele neuro,will have CTA head,check ammonia and TSH,hypoglycemia is resolved,will monitor closely.imagines and labs show no acute process,acute metabolic encephalopathy  seems improving,consulted neurology,ordered EEG.show no seizure activity,but metabolic  encephalopathy,MRI can not be done duo to AICD.  Discontinued Abx,no sign of infection is seen.    Interval History: .imagines and labs show no acute process,acute metabolic encephalopathy  seems improving,consulted neurology,ordered EEG.,show no seizure activity,but metabolic  encephalopathy,MRI can not be done duo to AICD.Discontinued Abx,no sign of infection is seen.    Review of Systems   Constitutional:  Positive for activity change and appetite change.   Respiratory:  Negative for apnea and chest tightness.    Gastrointestinal:  Negative for abdominal distention and abdominal pain.   Endocrine: Negative for cold intolerance and heat intolerance.   Genitourinary:  Negative for difficulty urinating and dyspareunia.   Neurological:  Positive for weakness.   Psychiatric/Behavioral:  Positive for confusion.      Objective:     Vital Signs (Most Recent):  Temp: 98.5 °F (36.9 °C) (04/18/24 0711)  Pulse: 87 (04/18/24 1121)  Resp: 18 (04/18/24 0711)  BP: 135/85 (04/18/24 0954)  SpO2: (!) 93 % (04/18/24 0711) Vital Signs (24h Range):  Temp:   "[86.3 °F (30.2 °C)-98.5 °F (36.9 °C)] 98.5 °F (36.9 °C)  Pulse:  [68-96] 87  Resp:  [16-21] 18  SpO2:  [93 %-99 %] 93 %  BP: (115-136)/(68-92) 135/85     Weight: 48.7 kg (107 lb 5.8 oz)  Body mass index is 18.43 kg/m².    Intake/Output Summary (Last 24 hours) at 4/18/2024 1135  Last data filed at 4/17/2024 1633  Gross per 24 hour   Intake 0 ml   Output 502 ml   Net -502 ml         Physical Exam  HENT:      Left Ear: There is no impacted cerumen.   Cardiovascular:      Rate and Rhythm: Normal rate and regular rhythm.   Pulmonary:      Effort: No respiratory distress.      Breath sounds: No wheezing.   Abdominal:      General: There is no distension.   Musculoskeletal:         General: No swelling.   Neurological:      Mental Status: She is alert. She is disoriented.             Significant Labs: All pertinent labs within the past 24 hours have been reviewed.    Significant Imaging: I have reviewed all pertinent imaging results/findings within the past 24 hours.    Assessment/Plan:      * Sepsis  This patient does have evidence of infective focus  My overall impression is sepsis.  Source:  line infection   Antibiotics given-   Antibiotics (72h ago, onward)      Start     Stop Route Frequency Ordered    04/13/24 2100  mupirocin 2 % ointment         04/18/24 2059 Nasl 2 times daily 04/13/24 1319    04/13/24 2000  ceFEPIme (MAXIPIME) 1 g in dextrose 5 % in water (D5W) 100 mL IVPB (MB+)         -- IV Every 12 hours (non-standard times) 04/13/24 1330    04/13/24 1217  vancomycin - pharmacy to dose  (vancomycin IVPB (PEDS and ADULTS))        Placed in "And" Linked Group    -- IV pharmacy to manage frequency 04/13/24 1117          Latest lactate reviewed-  Recent Labs   Lab 04/13/24  1107 04/13/24  1423   LACTATE  --  2.8*   POCLAC 3.67*  --        Organ dysfunction indicated by Acute kidney injury    Fluid challenge Ideal Body Weight- The patient's ideal body weight is Ideal body weight: 54.7 kg (120 lb 9.5 oz) which will " be used to calculate fluid bolus of 30 ml/kg for treatment of septic shock.      Post- resuscitation assessment Yes Perfusion exam was performed within 6 hours of septic shock presentation after bolus shows Adequate tissue perfusion assessed by non-invasive monitoring       Will Not start Pressors- Levophed for MAP of 65  Source control achieved by: IV Abx.  HR and BP is improved at this time.cultures sofar negative.    Acute metabolic encephalopathy    Patient is lethargic today,code stroke was called,transfered to ICU,head CT show no acute process,evaluated by Tele neuro,will have CTA head,check ammonia and TSH,hypoglycemia is resolved,will monitor closely..imagines and labs show no acute process,acute metabolic encephalopathy  seems improving,consulted neurology,ordered EEG.show no seizure activity,but metabolic  encephalopathy,MRI can not be done duo to AICD.Discontinued Abx,no sign of infection is seen.    Somnolence  As above.      PAD (peripheral artery disease)  On  medical treatments,wound care .      Severe protein-calorie malnutrition  Nutrition consulted. Most recent weight and BMI monitored-     Measurements:  Wt Readings from Last 1 Encounters:   04/13/24 46.3 kg (102 lb)   Body mass index is 17.51 kg/m².    Patient has been screened and assessed by RD.    Malnutrition Type:  Context:    Level:      Malnutrition Characteristic Summary:       Interventions/Recommendations (treatment strategy):         Hypothyroidism  On synthroid.      PAF (paroxysmal atrial fibrillation)   On OAC and BB.    ICD (implantable cardioverter-defibrillator) in place - SubQ  Will monitor.      Chronic combined systolic and diastolic congestive heart failure  Patient is identified as having Combined Systolic and Diastolic heart failure that is Chronic. CHF is currently controlled. Latest ECHO performed and demonstrates- Results for orders placed during the hospital encounter of 05/08/23    Echo Saline Bubble?  No    Interpretation Summary  · The left ventricle is normal in size with mild eccentric hypertrophy and severely decreased systolic function.  · The estimated ejection fraction is 23%.  · There is severe left ventricular global hypokinesis.  · Grade II left ventricular diastolic dysfunction.  · Moderate left atrial enlargement.  · Normal right ventricular size with normal right ventricular systolic function.  · Moderate right atrial enlargement.  · There is mild aortic valve stenosis.  · Aortic valve area is 1.58 cm2; peak velocity is 2.01 m/s; mean gradient is 8 mmHg.  · Moderate mitral regurgitation.  · Moderate to severe tricuspid regurgitation.  · Mild pulmonic regurgitation.  · Elevated central venous pressure (15 mmHg).  · The estimated PA systolic pressure is 47 mmHg.  · There is mild-moderate pulmonary hypertension.  · Moderate posterolateral pericardial effusion. Trivial under the RA  . Continue Beta Blocker and monitor clinical status closely. Monitor on telemetry. Patient is off CHF pathway.  Monitor strict Is&Os and daily weights.  Place on fluid restriction of 1.5 L. Cardiology has not been consulted. Continue to stress to patient importance of self efficacy and  on diet for CHF. Last BNP reviewed- and noted below   Recent Labs   Lab 04/13/24  1104   BNP >4,900*       Pulmonary hypertension  Fluid removal with HD.      Essential hypertension  On hold duo to sepsis.      VTE Risk Mitigation (From admission, onward)           Ordered     apixaban tablet 2.5 mg  2 times daily         04/17/24 1145                    Discharge Planning   MAVIS:      Code Status: Full Code   Is the patient medically ready for discharge?:     Reason for patient still in hospital (select all that apply): Patient trending condition  Discharge Plan A: Home Health   Discharge Delays: None known at this time              José Miguel Cristina MD  Department of Hospital Medicine   [facility]

## 2024-04-18 NOTE — PROGRESS NOTES
VANCOMYCIN DOSING BY PHARMACY DISCONTINUATION NOTE    Eva Bloom is a 58 y.o. female who had been consulted for vancomycin dosing.    The pharmacy consult for vancomycin dosing has been discontinued.     Vancomycin Dosing by Pharmacy Consult will sign-off. Please reconsult if necessary. Thank you for allowing us to participate in this patient's care.     Irina Nuñez  [phone]

## 2024-04-18 NOTE — SUBJECTIVE & OBJECTIVE
Interval history:     Remains encephalopathic / drowsy - opens eyes to verbal stimuli / following simple commands intermittently / oriented to person but less participatory for extended interactions / no focal motor or cranial nerve deficits        Past Medical History:   Diagnosis Date    Anemia     Anticoagulant long-term use     Atrial fibrillation     Bronchitis 03/01/2017    Cancer 2016    kidney cancer    CHF (congestive heart failure), NYHA class II, chronic, systolic     CMV (cytomegalovirus) antibody positive     CVA (cerebral vascular accident) 1/3/2024    Encounter for blood transfusion     ESRD (end stage renal disease)     Essential hypertension 09/23/2015    H/O herpes simplex type 2 infection     Herpes simplex type 1 antibody positive     History of kidney cancer     s/p left nephrectomy 1/2016    Hyperparathyroidism, unspecified     Hyperuricemia without signs of inflammatory arthritis and tophaceous disease     Kidney stones     LGSIL (low grade squamous intraepithelial dysplasia)     Myocardiopathy 07/21/2017    Prediabetes     Proteinuria     Renal disorder     Thyroid nodule     Urate nephropathy        Past Surgical History:   Procedure Laterality Date    BREAST CYST EXCISION      COLONOSCOPY N/A 11/12/2015    COLONOSCOPY N/A 03/12/2021    Procedure: COLONOSCOPY;  Surgeon: Brendon Lanier MD;  Location: [facility];  Service: Endoscopy;  Laterality: N/A;  covid test 3/9, labs prior, prep instr mailed -ml    EXCISION OF ARTERIOVENOUS FISTULA Left 09/27/2023    Procedure: EXCISION, AV FISTULA;  Surgeon: FARIDEH Muñoz III, MD;  Location: [facility] OR [address] [place] [address];  Service: Vascular;  Laterality: Left;  LUE AV graft excision    INSERTION OF BIVENTRICULAR IMPLANTABLE CARDIOVERTER-DEFIBRILLATOR (ICD)  04/2021    INSERTION OF TUNNELED CENTRAL VENOUS CATHETER (CVC) WITH SUBCUTANEOUS PORT N/A 01/25/2023    Procedure: INSERTION, DUAL LUMEN CATHETER WITH PORT, WITH IMAGING GUIDANCE;  Surgeon: FARIDEH Muñoz  III, MD;  [facility];  Service: Peripheral Vascular;  Laterality: N/A;  possinble permcath placment    NEPHRECTOMY-LAPAROSCOPIC Left 01/12/2016    PERCUTANEOUS TRANSLUMINAL ANGIOPLASTY OF ARTERIOVENOUS FISTULA Left 04/19/2023    Procedure: PTA, AV FISTULA;  Surgeon: FARIDEH Muñoz III, MD;  Location: [facility] CATH LAB;  Service: Peripheral Vascular;  Laterality: Left;    PERITONEAL CATHETER INSERTION      Permacath insertion  01/12/2017    PLACEMENT OF ARTERIOVENOUS GRAFT  12/28/2022    Procedure: INSERTION, GRAFT, ARTERIOVENOUS;  Surgeon: FARIDEH Muñoz III, MD;  [facility];  Service: Peripheral Vascular;;    REMOVAL, GRAFT, ARTERIOVENOUS, UPPER EXTREMITY Left 01/25/2023    Procedure: REMOVAL, GRAFT, ARTERIOVENOUS, UPPER EXTREMITY;  Surgeon: FARIDEH Muñoz III, MD;  [facility];  Service: Peripheral Vascular;  Laterality: Left;    REVISION OF ARTERIOVENOUS FISTULA Left 05/01/2023    Procedure: REVISION, AV FISTULA;  Surgeon: FARIDEH Muñoz III, MD;  [address];  Service: Peripheral Vascular;  Laterality: Left;  LUE AVG revision    REVISION OF PROCEDURE INVOLVING ARTERIOVENOUS GRAFT Left 12/28/2022    Procedure: REVISION, PROCEDURE INVOLVING ARTERIOVENOUS GRAFT;  Surgeon: FARIDEH Muñoz III, MD;  [facility];  Service: Peripheral Vascular;  Laterality: Left;    REVISION OF PROCEDURE INVOLVING ARTERIOVENOUS GRAFT Left 07/21/2023    Procedure: LEFT ARM ARTERIOVENOUS GRAFT EXCISION  AND LIGATION;  Surgeon: Obi Werner MD;  Location: [facility];  Service: Vascular;  Laterality: Left;  Left AVG excision and revision with interposition    SALPINGOOPHORECTOMY Right 2016    KJB---DAVINCI    TONSILLECTOMY      TUBAL LIGATION         Review of patient's allergies indicates:   Allergen Reactions    Carvedilol Other (See Comments)     Nausea/vomiting    Allopurinol      Other reaction(s): abnormal transaminases       Current Neurological  Medications:     Current Facility-Administered Medications   Medication Dose Route Frequency Provider Last Rate Last Admin    acetaminophen tablet 650 mg  650 mg Oral Q6H PRN José Miguel Cristina MD        apixaban tablet 2.5 mg  2.5 mg Oral BID José Miguel Cristina MD   2.5 mg at 04/18/24 0953    aspirin EC tablet 81 mg  81 mg Oral Daily José Miguel Cristina MD   81 mg at 04/18/24 0957    atorvastatin tablet 40 mg  40 mg Oral Daily José Miguel Cristina MD   40 mg at 04/18/24 0953    dextrose 10% bolus 125 mL 125 mL  12.5 g Intravenous PRN José Miguel Cristina MD        dextrose 10% bolus 250 mL 250 mL  25 g Intravenous PRN José Miguel Cristina MD        dextrose 5 % and 0.45 % NaCl infusion   Intravenous Continuous José Miguel Cristina MD 50 mL/hr at 04/17/24 1805 New Bag at 04/17/24 1805    famotidine tablet 20 mg  20 mg Oral Daily José Miguel Cristina MD   20 mg at 04/18/24 0954    glucagon (human recombinant) injection 1 mg  1 mg Intramuscular PRN José Miguel Cristina MD        glucose chewable tablet 16 g  16 g Oral PRN José Miguel Cristina MD        glucose chewable tablet 24 g  24 g Oral PRN José Miguel Cristina MD        levothyroxine tablet 75 mcg  75 mcg Oral Before breakfast José Miguel Cristina MD   75 mcg at 04/18/24 0645    metoprolol tartrate (LOPRESSOR) tablet 25 mg  25 mg Oral BID José Miguel Cristina MD   25 mg at 04/18/24 0954    mupirocin 2 % ointment   Nasal BID José Miguel Cristina MD   Given at 04/18/24 0956     Facility-Administered Medications Ordered in Other Encounters   Medication Dose Route Frequency Provider Last Rate Last Admin    0.9%  NaCl infusion   Intravenous Continuous Soni Bhatti MD   New Bag at 07/21/23 1116     Family History       Problem Relation (Age of Onset)    Cataracts Maternal Aunt    Diabetes Father, Maternal Grandfather    Heart disease Sister    Hypertension Mother    Kidney disease Father    No Known Problems Sister,  "Sister, Brother, Brother, Maternal Uncle, Paternal Aunt, Paternal Uncle, Maternal Grandmother, Paternal Grandmother, Paternal Grandfather, Son, Son, Other          Tobacco Use    Smoking status: Never     Passive exposure: Never    Smokeless tobacco: Never   Substance and Sexual Activity    Alcohol use: No    Drug use: Yes     Types: Hydrocodone    Sexual activity: Not Currently     Review of Systems   Unable to perform ROS: Acuity of condition   Neurological:  Negative for seizures, facial asymmetry and weakness.   Psychiatric/Behavioral:  Positive for confusion. Negative for agitation.      Objective:     Vital Signs (Most Recent):  Temp: 98.5 °F (36.9 °C) (04/18/24 0711)  Pulse: 93 (04/18/24 0717)  Resp: 18 (04/18/24 0711)  BP: 135/85 (04/18/24 0954)  SpO2: (!) 93 % (04/18/24 0711) Vital Signs (24h Range):  Temp:  [86.3 °F (30.2 °C)-98.5 °F (36.9 °C)] 98.5 °F (36.9 °C)  Pulse:  [68-96] 93  Resp:  [16-21] 18  SpO2:  [93 %-99 %] 93 %  BP: (100-136)/(65-92) 135/85     Weight: 48.7 kg (107 lb 5.8 oz)  Body mass index is 18.43 kg/m².     Physical Exam  HENT:      Head: Normocephalic and atraumatic.   Eyes:      Extraocular Movements: Extraocular movements intact and EOM normal.   Pulmonary:      Effort: No respiratory distress.   Neurological:      Mental Status: She is alert.          NEUROLOGICAL EXAMINATION:     MENTAL STATUS   Level of consciousness: alert       Not following commands   Non verbal majorly / less participatory      CRANIAL NERVES     CN III, IV, VI   Extraocular motions are normal.   Nystagmus: none   Ophthalmoparesis: none    CN VII   Facial expression full, symmetric.     MOTOR EXAM        No new focal motor deficits       SENSORY EXAM        No new focal sensory deficits       GAIT AND COORDINATION     Tremor   Resting tremor: absent      Significant Labs: Hemoglobin A1c: No results for input(s): "HGBA1C" in the last 720 hours.  Blood Culture: No results for input(s): "LABBLOO" in the last 48 " "hours.  BMP:   Recent Labs   Lab 04/17/24  0506 04/18/24 0447    101    133*   K 4.4 3.8   CL 96 98   CO2 18* 20*   BUN 34* 16   CREATININE 5.6* 3.8*   CALCIUM 10.5 10.4     CBC:   Recent Labs   Lab 04/18/24 0447   WBC 4.61   HGB 10.7*   HCT 35.6*   *     CMP:   Recent Labs   Lab 04/17/24  0506 04/18/24 0447    101    133*   K 4.4 3.8   CL 96 98   CO2 18* 20*   BUN 34* 16   CREATININE 5.6* 3.8*   CALCIUM 10.5 10.4   ANIONGAP 22* 15     CSF Culture: No results for input(s): "CSFCULTURE" in the last 48 hours.  CSF Studies: No results for input(s): "ALIQUT", "APPEARCSF", "COLORCSF", "CSFWBC", "CSFRBC", "GLUCCSF", "LDHCSF", "PROTEINCSF", "VDRLCSF" in the last 48 hours.  Inflammatory Markers:   Recent Labs   Lab 04/17/24  0918   SEDRATE 17     POCT Glucose:   Recent Labs   Lab 04/17/24  1540 04/17/24  2123 04/18/24  0710   POCTGLUCOSE 80 102 114*     Prealbumin: No results for input(s): "PREALBUMIN" in the last 48 hours.  Respiratory Culture: No results for input(s): "GSRESP", "RESPIRATORYC" in the last 48 hours.  Urine Culture: No results for input(s): "LABURIN" in the last 48 hours.  Urine Studies: No results for input(s): "COLORU", "APPEARANCEUA", "PHUR", "SPECGRAV", "PROTEINUA", "GLUCUA", "KETONESU", "BILIRUBINUA", "OCCULTUA", "NITRITE", "UROBILINOGEN", "LEUKOCYTESUR", "RBCUA", "WBCUA", "BACTERIA", "SQUAMEPITHEL", "HYALINECASTS" in the last 48 hours.    Invalid input(s): "WRIGHTSUR"  Recent Lab Results         04/18/24  0710   04/18/24  0447   04/17/24  2123   04/17/24  1540        Anion Gap   15           Baso #   0.06           Basophil %   1.3           BUN   16           Calcium   10.4           Chloride   98           CO2   20           Creatinine   3.8           Differential Method   Automated           eGFR   13           Eos #   0.1           Eos %   2.2           Glucose   101           Gran # (ANC)   3.2           Gran %   69.4           Hematocrit   35.6           " Hemoglobin   10.7           Immature Grans (Abs)   0.03  Comment: Mild elevation in immature granulocytes is non specific and   can be seen in a variety of conditions including stress response,   acute inflammation, trauma and pregnancy. Correlation with other   laboratory and clinical findings is essential.             Immature Granulocytes   0.7           Lymph #   0.7           Lymph %   15.8           MCH   28.1           MCHC   30.1           MCV   93           Mono #   0.5           Mono %   10.6           MPV   12.5           nRBC   2           Platelet Count   131           POCT Glucose 114     102   80       Potassium   3.8           RBC   3.81           RDW   21.7           Sodium   133           Vancomycin, Random   20.3           WBC   4.61                 All pertinent lab results from the past 24 hours have been reviewed.    Significant Imaging: I have reviewed and interpreted all pertinent imaging results/findings within the past 24 hours.

## 2024-04-18 NOTE — SUBJECTIVE & OBJECTIVE
Interval History: .imagines and labs show no acute process,acute metabolic encephalopathy  seems improving,consulted neurology,ordered EEG.,show no seizure activity,but metabolic  encephalopathy,MRI can not be done duo to AICD.Discontinued Abx,no sign of infection is seen.    Review of Systems   Constitutional:  Positive for activity change and appetite change.   Respiratory:  Negative for apnea and chest tightness.    Gastrointestinal:  Negative for abdominal distention and abdominal pain.   Endocrine: Negative for cold intolerance and heat intolerance.   Genitourinary:  Negative for difficulty urinating and dyspareunia.   Neurological:  Positive for weakness.   Psychiatric/Behavioral:  Positive for confusion.      Objective:     Vital Signs (Most Recent):  Temp: 98.5 °F (36.9 °C) (04/18/24 0711)  Pulse: 87 (04/18/24 1121)  Resp: 18 (04/18/24 0711)  BP: 135/85 (04/18/24 0954)  SpO2: (!) 93 % (04/18/24 0711) Vital Signs (24h Range):  Temp:  [86.3 °F (30.2 °C)-98.5 °F (36.9 °C)] 98.5 °F (36.9 °C)  Pulse:  [68-96] 87  Resp:  [16-21] 18  SpO2:  [93 %-99 %] 93 %  BP: (115-136)/(68-92) 135/85     Weight: 48.7 kg (107 lb 5.8 oz)  Body mass index is 18.43 kg/m².    Intake/Output Summary (Last 24 hours) at 4/18/2024 1135  Last data filed at 4/17/2024 1633  Gross per 24 hour   Intake 0 ml   Output 502 ml   Net -502 ml         Physical Exam  HENT:      Left Ear: There is no impacted cerumen.   Cardiovascular:      Rate and Rhythm: Normal rate and regular rhythm.   Pulmonary:      Effort: No respiratory distress.      Breath sounds: No wheezing.   Abdominal:      General: There is no distension.   Musculoskeletal:         General: No swelling.   Neurological:      Mental Status: She is alert. She is disoriented.             Significant Labs: All pertinent labs within the past 24 hours have been reviewed.    Significant Imaging: I have reviewed all pertinent imaging results/findings within the past 24 hours.

## 2024-04-19 LAB
POCT GLUCOSE: 102 MG/DL (ref 70–110)
POCT GLUCOSE: 108 MG/DL (ref 70–110)

## 2024-04-19 PROCEDURE — 21400001 HC TELEMETRY ROOM

## 2024-04-19 PROCEDURE — 90935 HEMODIALYSIS ONE EVALUATION: CPT

## 2024-04-19 PROCEDURE — 25000003 PHARM REV CODE 250: Performed by: HOSPITALIST

## 2024-04-19 RX ADMIN — FAMOTIDINE 20 MG: 20 TABLET ORAL at 03:04

## 2024-04-19 RX ADMIN — APIXABAN 2.5 MG: 2.5 TABLET, FILM COATED ORAL at 03:04

## 2024-04-19 RX ADMIN — ATORVASTATIN CALCIUM 40 MG: 40 TABLET, FILM COATED ORAL at 03:04

## 2024-04-19 RX ADMIN — METOPROLOL TARTRATE 25 MG: 25 TABLET, FILM COATED ORAL at 10:04

## 2024-04-19 RX ADMIN — ASPIRIN 81 MG: 81 TABLET, COATED ORAL at 03:04

## 2024-04-19 RX ADMIN — LEVOTHYROXINE SODIUM 75 MCG: 75 TABLET ORAL at 05:04

## 2024-04-19 RX ADMIN — APIXABAN 2.5 MG: 2.5 TABLET, FILM COATED ORAL at 10:04

## 2024-04-19 RX ADMIN — METOPROLOL TARTRATE 25 MG: 25 TABLET, FILM COATED ORAL at 03:04

## 2024-04-19 NOTE — ASSESSMENT & PLAN NOTE
"This patient does have evidence of infective focus  My overall impression is sepsis.  Source:  line infection   Antibiotics given-   Antibiotics (72h ago, onward)      Start     Stop Route Frequency Ordered    04/13/24 2100  mupirocin 2 % ointment         04/18/24 2059 Nasl 2 times daily 04/13/24 1319          Latest lactate reviewed-  No results for input(s): "LACTATE", "POCLAC" in the last 72 hours.    Organ dysfunction indicated by Acute kidney injury    Fluid challenge Ideal Body Weight- The patient's ideal body weight is Ideal body weight: 54.7 kg (120 lb 9.5 oz) which will be used to calculate fluid bolus of 30 ml/kg for treatment of septic shock.      Post- resuscitation assessment Yes Perfusion exam was performed within 6 hours of septic shock presentation after bolus shows Adequate tissue perfusion assessed by non-invasive monitoring       Will Not start Pressors- Levophed for MAP of 65  Source control achieved by: IV Abx.  HR and BP is improved at this time.cultures sofar negative.DC all Abx,  "

## 2024-04-19 NOTE — PLAN OF CARE
Patient's  stated he would prefer patient return home and resume HH.  CM spoke with patient during HD.  She agreed with  and said [facility] would [facility]       04/19/24 9551   Discharge Reassessment   Assessment Type Discharge Planning Reassessment   Did the patient's condition or plan change since previous assessment? No   Discharge Plan discussed with: Patient   Communicated MAVIS with patient/caregiver Date not available/Unable to determine   Discharge Plan A Home Health  (and OP HD)   Discharge Plan B   (tbd)   DME Needed Upon Discharge    (tbd)   Transition of Care Barriers None   Why the patient remains in the hospital Requires continued medical care

## 2024-04-19 NOTE — PROGRESS NOTES
[facility] Medicine  Progress Note    Patient Name: Eva Bloom  MRN: [id number]  Patient Class: IP- Inpatient   Admission Date: 4/13/2024  Length of Stay: 6 days  Attending Physician: José Miguel Cristina, *  Primary Care Provider: Sowmya Mccain MD        Subjective:     Principal Problem:Sepsis        HPI:  This is a 58 year old female, with a Anemia, Atrial fibrillation, CHF NYHA class II, chronic, systolic, CVA, ESRD on dialysis (Tues, Thurs, Sat) with completion of session today, Essential HTN, Hx of kidney cancer, Hyperparathyroidism, Myocardiopathy, and Prediabetes, who presents to the ED complaining of Palpitations, symptoms onset PTA. Patient reports cough, sleep disturbance, and generalized pruritus. Patient states she was at dialysis PTA and receiving dialysis when her heart rate jumped into the 120's and the dialysis nurses instructed her to present to the ER. Patient also states she has been on dialysis for 8 years and that she does not make urine. Patient further states she is on 2.5 mg of Eliquis. Additional history obtained from independent historian: patient's  states that in July last year she had a spot on her left foot and had a line inserted. Patient denies Hx of irregular heart rate or MI. Patient also denies rhinorrhea, sore throat, abdominal pain, emesis, diarrhea, or other associated symptoms. No other alleviating or exacerbating factors. This is the extent of the patient's complaints in the ED.patient is febrile in .8,tachycardic 120.sepsis likely  duo to l;ine infection,patient has been started on broad  spectrum IV Abx,blood culture is pending.    Overview/Hospital Course:  This is a 58 year old female, with a Anemia, Atrial fibrillation, CHF NYHA class II, chronic, systolic, CVA, ESRD on dialysis (Tues, Thurs, Sat) with completion of session today, Essential HTN, Hx of kidney cancer, Hyperparathyroidism, Myocardiopathy, and Prediabetes, who  presents to the ED complaining of Palpitations, symptoms onset PTA. Patient reports cough, sleep disturbance, and generalized pruritus. Patient states she was at dialysis PTA and receiving dialysis when her heart rate jumped into the 120's and the dialysis nurses instructed her to present to the ER. .patient is febrile in .8,tachycardic 120.sepsis likely  duo to l;ine infection,patient has been started on broad  spectrum IV Abx,blood culture is pending.HR and BP is improved at this time.cultures sofar negative.  Patient is lethargic today,code stroke was called,transfered to ICU,head CT show no acute process,evaluated by Tele neuro,will have CTA head,check ammonia and TSH,hypoglycemia is resolved,will monitor closely.imagines and labs show no acute process,acute metabolic encephalopathy  seems improving,consulted neurology,ordered EEG.show no seizure activity,but metabolic  encephalopathy,MRI can not be done duo to AICD.patient seems is deliriums,per \d,some improvement.  Discontinued Abx,no sign of infection is seen.    Interval History: .imagines and labs show no acute process,acute metabolic encephalopathy  seems improving,consulted neurology,ordered EEG.,show no seizure activity,but metabolic  encephalopathy,MRI can not be done duo to AICD.Discontinued Abx,no sign of infection is seen.patient seems is delirious,per ,some improvement.    Review of Systems   Constitutional:  Positive for activity change and appetite change.   Respiratory:  Negative for apnea and chest tightness.    Gastrointestinal:  Negative for abdominal distention and abdominal pain.   Endocrine: Negative for cold intolerance and heat intolerance.   Genitourinary:  Negative for difficulty urinating and dyspareunia.   Neurological:  Positive for weakness.   Psychiatric/Behavioral:  Positive for confusion.      Objective:     Vital Signs (Most Recent):  Temp: 97.9 °F (36.6 °C) (04/19/24 0758)  Pulse: 75 (04/19/24 0758)  Resp:  "18 (04/19/24 0758)  BP: 121/73 (04/19/24 0758)  SpO2: 97 % (04/19/24 0758) Vital Signs (24h Range):  Temp:  [97.5 °F (36.4 °C)-98.5 °F (36.9 °C)] 97.9 °F (36.6 °C)  Pulse:  [71-87] 75  Resp:  [18] 18  SpO2:  [88 %-99 %] 97 %  BP: (116-140)/(73-85) 121/73     Weight: 48.7 kg (107 lb 5.8 oz)  Body mass index is 18.43 kg/m².    Intake/Output Summary (Last 24 hours) at 4/19/2024 1009  Last data filed at 4/18/2024 1222  Gross per 24 hour   Intake 0 ml   Output 1 ml   Net -1 ml         Physical Exam  HENT:      Left Ear: There is no impacted cerumen.   Cardiovascular:      Rate and Rhythm: Normal rate and regular rhythm.   Pulmonary:      Effort: No respiratory distress.      Breath sounds: No wheezing.   Abdominal:      General: There is no distension.   Musculoskeletal:         General: No swelling.   Neurological:      Mental Status: She is alert. She is disoriented.             Significant Labs: All pertinent labs within the past 24 hours have been reviewed.    Significant Imaging: I have reviewed all pertinent imaging results/findings within the past 24 hours.    Assessment/Plan:      * Sepsis  This patient does have evidence of infective focus  My overall impression is sepsis.  Source:  line infection   Antibiotics given-   Antibiotics (72h ago, onward)      Start     Stop Route Frequency Ordered    04/13/24 2100  mupirocin 2 % ointment         04/18/24 2059 Nasl 2 times daily 04/13/24 1319          Latest lactate reviewed-  No results for input(s): "LACTATE", "POCLAC" in the last 72 hours.    Organ dysfunction indicated by Acute kidney injury    Fluid challenge Ideal Body Weight- The patient's ideal body weight is Ideal body weight: 54.7 kg (120 lb 9.5 oz) which will be used to calculate fluid bolus of 30 ml/kg for treatment of septic shock.      Post- resuscitation assessment Yes Perfusion exam was performed within 6 hours of septic shock presentation after bolus shows Adequate tissue perfusion assessed by " non-invasive monitoring       Will Not start Pressors- Levophed for MAP of 65  Source control achieved by: IV Abx.  HR and BP is improved at this time.cultures sofar negative.DC all Abx,    Acute metabolic encephalopathy    Patient is lethargic today,code stroke was called,transfered to ICU,head CT show no acute process,evaluated by Tele neuro,will have CTA head,check ammonia and TSH,hypoglycemia is resolved,will monitor closely..imagines and labs show no acute process,acute metabolic encephalopathy  seems improving,consulted neurology,ordered EEG.show no seizure activity,but metabolic  encephalopathy,MRI can not be done duo to AICD.Discontinued Abx,no sign of infection is seen.patient seems is delirious,per ,some improvement.    Somnolence  As above.      PAD (peripheral artery disease)  On  medical treatments,wound care .      Severe protein-calorie malnutrition  Nutrition consulted. Most recent weight and BMI monitored-     Measurements:  Wt Readings from Last 1 Encounters:   04/13/24 46.3 kg (102 lb)   Body mass index is 17.51 kg/m².    Patient has been screened and assessed by RD.    Malnutrition Type:  Context:    Level:      Malnutrition Characteristic Summary:       Interventions/Recommendations (treatment strategy):         Hypothyroidism  On synthroid.      PAF (paroxysmal atrial fibrillation)   On OAC and BB.    ICD (implantable cardioverter-defibrillator) in place - SubQ  Will monitor.      Chronic combined systolic and diastolic congestive heart failure  Patient is identified as having Combined Systolic and Diastolic heart failure that is Chronic. CHF is currently controlled. Latest ECHO performed and demonstrates- Results for orders placed during the hospital encounter of 05/08/23    Echo Saline Bubble? No    Interpretation Summary  · The left ventricle is normal in size with mild eccentric hypertrophy and severely decreased systolic function.  · The estimated ejection fraction is 23%.  · There  is severe left ventricular global hypokinesis.  · Grade II left ventricular diastolic dysfunction.  · Moderate left atrial enlargement.  · Normal right ventricular size with normal right ventricular systolic function.  · Moderate right atrial enlargement.  · There is mild aortic valve stenosis.  · Aortic valve area is 1.58 cm2; peak velocity is 2.01 m/s; mean gradient is 8 mmHg.  · Moderate mitral regurgitation.  · Moderate to severe tricuspid regurgitation.  · Mild pulmonic regurgitation.  · Elevated central venous pressure (15 mmHg).  · The estimated PA systolic pressure is 47 mmHg.  · There is mild-moderate pulmonary hypertension.  · Moderate posterolateral pericardial effusion. Trivial under the RA  . Continue Beta Blocker and monitor clinical status closely. Monitor on telemetry. Patient is off CHF pathway.  Monitor strict Is&Os and daily weights.  Place on fluid restriction of 1.5 L. Cardiology has not been consulted. Continue to stress to patient importance of self efficacy and  on diet for CHF. Last BNP reviewed- and noted below   Recent Labs   Lab 04/13/24  1104   BNP >4,900*       Pulmonary hypertension  Fluid removal with HD.      Essential hypertension  On hold duo to sepsis.      VTE Risk Mitigation (From admission, onward)           Ordered     apixaban tablet 2.5 mg  2 times daily         04/17/24 1145                    Discharge Planning   MAVIS:      Code Status: Full Code   Is the patient medically ready for discharge?:     Reason for patient still in hospital (select all that apply): Patient trending condition  Discharge Plan A: Home Health   Discharge Delays: None known at this time              José Miguel Cristina MD  Department of Hospital Medicine   [facility]

## 2024-04-19 NOTE — PT/OT/SLP PROGRESS
Speech Language Pathology      Eva Bloom  MRN: [id number]    Patient not seen today secondary to ARIEL for dialysis. ST will follow-up 4/20.    Holly Jung, BETH-SLP

## 2024-04-19 NOTE — NURSING
[facility] [place]  Nurses Note -- 4 Eyes      4/19/2024       Skin assessed on: Q Shift      [] No Pressure Injuries Present    []Prevention Measures Documented    [x] Yes LDA  for Pressure Injury Previously documented     [] Yes New Pressure Injury Discovered   [] LDA for New Pressure Injury Added      Attending RN:  Katelyn Ramirez RN     Second RN:  Melissa ZARCO RN

## 2024-04-19 NOTE — PLAN OF CARE
Problem: Adult Inpatient Plan of Care  Goal: Plan of Care Review  Outcome: Ongoing, Progressing  Goal: Patient-Specific Goal (Individualized)  Outcome: Ongoing, Progressing  Goal: Absence of Hospital-Acquired Illness or Injury  Outcome: Ongoing, Progressing  Goal: Optimal Comfort and Wellbeing  Outcome: Ongoing, Progressing  Goal: Readiness for Transition of Care  Outcome: Ongoing, Progressing     Problem: Adjustment to Illness (Sepsis/Septic Shock)  Goal: Optimal Coping  Outcome: Ongoing, Progressing     Problem: Bleeding (Sepsis/Septic Shock)  Goal: Absence of Bleeding  Outcome: Ongoing, Progressing     Problem: Glycemic Control Impaired (Sepsis/Septic Shock)  Goal: Blood Glucose Level Within Desired Range  Outcome: Ongoing, Progressing     Problem: Infection Progression (Sepsis/Septic Shock)  Goal: Absence of Infection Signs and Symptoms  Outcome: Ongoing, Progressing     Problem: Nutrition Impaired (Sepsis/Septic Shock)  Goal: Optimal Nutrition Intake  Outcome: Ongoing, Progressing     Problem: Infection  Goal: Absence of Infection Signs and Symptoms  Outcome: Ongoing, Progressing     Problem: Fall Injury Risk  Goal: Absence of Fall and Fall-Related Injury  Outcome: Ongoing, Progressing     Problem: Skin Injury Risk Increased  Goal: Skin Health and Integrity  Outcome: Ongoing, Progressing     Problem: Device-Related Complication Risk (Hemodialysis)  Goal: Safe, Effective Therapy Delivery  Outcome: Ongoing, Progressing     Problem: Hemodynamic Instability (Hemodialysis)  Goal: Effective Tissue Perfusion  Outcome: Ongoing, Progressing     Problem: Infection (Hemodialysis)  Goal: Absence of Infection Signs and Symptoms  Outcome: Ongoing, Progressing

## 2024-04-19 NOTE — NURSING
[facility] History and Physical      Chief Complaint:     Chief Complaint   Patient presents with    Worsening wound of the left heel with drainage  Back pain       History Of Present Illness  Patient is a 59-year-old male with a past medical history significant for chronic left heel ulcer with chronic osteomyelitis, nonobstructive peripheral arterial disease, type 2 diabetes mellitus, hypertension, CKD stage IIIB, anemia of chronic disease and HFpEF who presented to the ED with complaint of back pain, generalized weakness and worsening wound of his left heel.  At the time of the interview, patient was found to be a poor historian secondary to poor insight into disease process, medications and medical history.  Patient reports moderate back pain, generalized weakness over the past 2 days as well as worsening wound of his left heel now associated with purulent drainage.  He states that he feels tired all the time.  Patient states that he was evaluated by his podiatrist 1 week ago and reports strict compliance with oral antibiotic therapy outpatient.  Review of system is significant for nausea and generalized body aches.  Patient denies fever, chills, vomiting, chest pain, shortness of breath, productive cough, diaphoresis, dizziness, lightheadedness or syncope.    Patient was seen in the ED febrile at 101.3 °F, otherwise hemodynamically stable.  Initial workup was significant for blood glucose 211, BUN 42 with creatinine 2.08, procalcitonin 0.41, WBC 12.8, hemoglobin 8.2 with hematocrit 24.5.  Remaining CBC and CMP unremarkable.  X-ray of the left foot with diffuse left ankle and left foot soft tissue swelling.  Patient was admitted for further workup and management.      14 point Review of Systems is negative except for as noted above.      Past Medical History  Past Medical History:   Diagnosis Date    Chronic kidney disease     Congestive cardiac failure (CMD)     Diabetes mellitus (CMD)     Diabetic foot  Pt left for dialysis, NAD noted.    1355-  Received report 1/2 L of fluid removed, pt tolerated dialysis well. Pt returned from dialysis, NAD noted.    infection (CMD)     Essential (primary) hypertension        Surgical History  Past Surgical History:   Procedure Laterality Date    Foot surgery Left     Surgical I&D          Social History  Social History     Tobacco Use    Smoking status: Never     Passive exposure: Never    Smokeless tobacco: Never   Vaping Use    Vaping Use: never used   Substance Use Topics    Alcohol use: Not Currently    Drug use: Never       Family History    Family History   Problem Relation Age of Onset    Heart disease Father        Allergies  ALLERGIES:  Patient has no known allergies.    Inpatient Medications  Current Facility-Administered Medications   Medication Dose Route Frequency Provider Last Rate Last Admin    sodium chloride 0.9 % flush bag 25 mL  25 mL Intravenous PRN Davey Oneil, DO        sodium chloride 0.9 % injection 2 mL  2 mL Intracatheter 2 times per day OneilDavey crews, DO   2 mL at 12/25/23 2058    vancomycin (VANCOCIN) 1,000 mg in sodium chloride 0.9 % 250 mL IVPB  1,000 mg Intravenous Once Davey Oneil, .7 mL/hr at 12/25/23 2057 1,000 mg at 12/25/23 2057    VANCOMYCIN - PHARMACIST MONITORED Misc   Does not apply See Admin Instructions Kwadwo Reed MD        [START ON 12/26/2023] cefepime (MAXIPIME) 1,000 mg in sodium chloride 0.9 % 100 mL IVPB  1,000 mg Intravenous 2 times per day Kwadwo Reed MD        dextrose 50 % injection 25 g  25 g Intravenous PRN Kwadwo Reed MD        dextrose 50 % injection 12.5 g  12.5 g Intravenous PRN Kwadwo Reed MD        glucagon (GLUCAGEN) injection 1 mg  1 mg Intramuscular PRN Kwadwo Reed MD        dextrose (GLUTOSE) 40 % gel 15 g  15 g Oral PRN Kwadwo Reed MD        dextrose (GLUTOSE) 40 % gel 30 g  30 g Oral PRN Kwadwo Reed MD        [START ON 12/26/2023] insulin lispro (ADMELOG,HumaLOG) - Correction Dose   Subcutaneous TID WC Kwadwo Reed MD        insulin lispro (ADMELOG,HumaLOG) - Correction Dose   Subcutaneous Nightly Kwadwo Reed  MD        [START ON 12/26/2023] amLODIPine (NORVASC) tablet 10 mg  10 mg Oral Daily Kwadwo Reed MD        [START ON 12/26/2023] aspirin (ECOTRIN) enteric coated tablet 81 mg  81 mg Oral Daily Kwadwo Reed MD        atorvastatin (LIPITOR) tablet 40 mg  40 mg Oral Nightly Kwadwo Reed MD        [START ON 12/26/2023] carvedilol (COREG) tablet 12.5 mg  12.5 mg Oral 2 times per day Kwadwo Reed MD        [START ON 12/26/2023] ferrous sulfate (65 mg Fe per 325 mg) tablet 325 mg  325 mg Oral Daily with breakfast Kwadwo Reed MD        insulin glargine (LANTUS) injection 10 Units  10 Units Subcutaneous Nightly Kwadwo Reed MD        [START ON 12/26/2023] torsemide (DEMADEX) tablet 20 mg  20 mg Oral Daily Kwadwo Reed MD        acetaminophen (TYLENOL) tablet 650 mg  650 mg Oral Q4H PRN Kwadwo Reed MD        Or    acetaminophen (TYLENOL) suppository 650 mg  650 mg Rectal Q4H PRN Kwadwo Reed MD        ondansetron (ZOFRAN ODT) disintegrating tablet 4 mg  4 mg Oral Q12H PRN Kwadwo Reed MD        Or    ondansetron (ZOFRAN) injection 4 mg  4 mg Intravenous Q12H PRN Kwadwo Reed MD        polyethylene glycol (MIRALAX) packet 17 g  17 g Oral Daily PRN Kwadwo Reed MD        melatonin tablet 6 mg  6 mg Oral Nightly PRN Kwadwo Reed MD        Potassium Standard Replacement Protocol (Levels 3.5 and lower)   Does not apply See Admin Instructions Kwadwo Reed MD        Magnesium Standard Replacement Protocol   Does not apply See Admin Instructions Kwadwo Reed MD        calcium carbonate (TUMS) chewable tablet 500 mg  500 mg Oral Q4H PRN Kwadwo Reed MD        [START ON 12/26/2023] pantoprazole (PROTONIX) EC tablet 40 mg  40 mg Oral QAM AC Kwadwo Reed MD        [START ON 12/26/2023] heparin (porcine) injection 5,000 Units  5,000 Units Subcutaneous 3 times per day Kwadwo Reed MD         Current Outpatient Medications   Medication Sig Dispense Refill     amoxicillin-clavulanate (AUGMENTIN) 875-125 MG per tablet Take 1 tablet by mouth in the morning and 1 tablet in the evening. Do not start before November 28, 2023. 76 tablet 0    torsemide (DEMADEX) 20 MG tablet Take 1 tablet by mouth daily. 30 tablet 1    amLODIPine (NORVASC) 10 MG tablet Take 1 tablet by mouth daily. 90 tablet 3    atorvastatin (LIPITOR) 40 MG tablet Take 1 tablet by mouth nightly.      carvedilol (COREG) 12.5 MG tablet Take 1 tablet by mouth every 12 hours.      aspirin 81 MG EC tablet Take 1 tablet by mouth daily. 30 tablet 0    insulin glargine (LANTUS) 100 UNIT/ML vial solution Inject 10 Units into the skin nightly. 10 mL 0    ferrous sulfate 325 (65 FE) MG tablet Take 1 tablet by mouth daily (with breakfast). 30 tablet 0       In/Out    Intake/Output Summary (Last 24 hours) at 12/25/2023 2206  Last data filed at 12/25/2023 2059  Gross per 24 hour   Intake 100 ml   Output --   Net 100 ml          Physical Exam  Visit Vitals  BP (!) 145/76   Pulse 86   Temp 99.5 °F (37.5 °C) (Oral)   Resp 18   SpO2 99%       Physical Exam:  General: Alert and oriented to person, place and time, in no acute distress.  Ill-appearing.  Eyes: no scleral icterus, no conjunctival erythema   Cardio:S1 and S2 normal, RRR, no murmur noted.  1+ pitting edema bilaterally.  Pulm: Lungs clear to auscultation bilaterally, no wheeze or rhonchi noted  GI: Soft, non-tender, nondistended. Normal bowel sounds auscultated   : No suprapubic Tenderness, no CVA tenderness bilaterally  Ext: Bilateral lower extremity trace edema.  Musculoskeletal: No joints tenderness or erythema.  Skin: Left heel wound and right big toe unstageable ulcer as pictured below. No jaundice. Peripheral pulse 2+ bilaterally. Capillary refil < 2 seconds. No skin mottling present  Psych: Appropriate mood and affect. Good Insight and Judgment  Neuro: Motor strength 5/5 BUE and BLE. Patient appropriately follows commands            Labs     Recent Labs   Lab  12/25/23  1836   SODIUM 135   POTASSIUM 4.8   CHLORIDE 98   CO2 26   BUN 42*   CREATININE 2.08*   GLUCOSE 211*   ALBUMIN 2.7*   AST 12   BILIRUBIN 0.4       Imaging    XR CHEST AP OR PA   Final Result   No definitive acute cardiopulmonary findings.  Recommend   followup as clinically warranted.      Electronically Signed by: LOIS ALDANA M.D.    Signed on: 12/25/2023 9:46 PM    Workstation ID: [id number]      XR FOOT 2 VIEWS LEFT   Final Result   FINDINGS/IMPRESSION: Focal osteopenia/osteolysis is identified in proximal   plantar calcaneus which appears new compared to prior study, suspicious for   osteomyelitis. Recommend clinical correlation and follow-up as clinically   warranted.      Diffuse left ankle and left foot soft tissue swelling are noted. Diffuse   arterial calcifications in visualized soft tissues of left ankle and left   foot appear relatively stable.      Multiple probable hammertoes appear relatively stable.      Report is provided at time of study.      Electronically Signed by: LOIS ALDANA M.D.    Signed on: 12/25/2023 7:35 PM    Workstation ID: [id number]          I personally reviewed the patient's imaging and radiology report(s).     Assessment/Plan  Patient is a 59-year-old male with a past medical history significant for chronic left heel ulcer with chronic osteomyelitis, nonobstructive peripheral arterial disease, type 2 diabetes mellitus, hypertension, CKD stage IIIB, anemia of chronic disease and HFpEF who presented to the ED with complaint of back pain, generalized weakness and worsening wound of his left heel.     Sepsis with chronic left heel stage IV ulcer with osteomyelitis  Patient presented on admission with complaint of worsening wound of his left heel, now associated with purulent discharge.  Seen on admission febrile with leukocytosis and elevated procalcitonin 0.41.  Clinical presentation is concerning for recurrent/acute on chronic infected left heel ulcer with  osteomyelitis.  Poor historian on admission.  Cannot rule out other infectious source.  Will check urinalysis, chest x-ray and respiratory panel. Patient states that he recently completing antibiotic therapy with Augmentin outpatient.  Sepsis protocol initiated in the ED at 1949.  Infectious workup including blood cultures pending.  ID and podiatry on consult.  Follow-up additional recommendations after evaluation.    Type 2 diabetes mellitus with hyperglycemia  Diabetic nephropathy with CKD stage III  Blood glucose 211 on admission.  Continue management with insulin sliding scale and monitor with Accu-Cheks.    CKD stage IIIB  Creatinine 2.08 which is close to baseline as compared to previous creatinine levels.  Continue to monitor kidney function and avoid nephrotoxins including NSAIDs.    Anemia of chronic disease  Hemoglobin 8.2 on admission which is at baseline as compared to recent hemoglobin levels.  No evidence of active bleeding.  Continue to monitor hemoglobin.    Hypertensive heart disease with CKD and CHF  BP elevated on admission.  Will resume home antihypertensive regimen including amlodipine and carvedilol.  Continue to monitor BP and titrate antihypertensive regimen as clinically indicated.    Chronic HFpEF  Patient seen clinically compensated and close to euvolemia.  Last 2D echo significant for EF 65%.  Resume home medical management including torsemide and continue to monitor clinically.    DVT Prophylaxis: SCD. Heparin SQ.    CODE STATUS: Full Code     Communication: Plan of care discussed with patient who verbalized understanding and agrees with plan of care.    Based on patient's presentation on admission, patient meets criteria for level 3 with inpatient status based on high medical complexity and anticipated more than 2 midnights stay for medical management.    MORE than 55 MINS WERE SPENT ON THIS PATIENTS CARE TODAY. This includes the following: Reviewed all vitals, medications, new orders,  I/O, labs, micro, radiology, nurses notes, pertinent consultant notes which are reflected in assessment and plan.This does not include time spent on other items of care such as smoking cessation counseling, prolonged care time, and or advanced care planning if applicable.     [facility]  Please reach out through perfectserve.      Patient Privacy Notice      The 21st Century Cures Act makes medical notes like these available to patients in the interest of transparency. Please be advised that this is a medical document. Medical documents are intended to carry relevant information and the clinical opinion of the practitioner. The medical note is intended as medical provider to provider communication, and may appear blunt or direct. It is written in medical language, and may contain abbreviations or verbiage that are unfamiliar.

## 2024-04-19 NOTE — PROGRESS NOTES
"alert today does not interact with me at all     Opens her eyes follows complex commands now with verbal interaction with me    HPI/ROS unable to obtain     CXR terrible cardiomegaly     Lactic acidosis noted tx with hd last lv 2.1     Hypercalcemia noted 11.5 alb 3.5     at bedside He tells me that she has hx of cancer was on "pills". Her Dr name is Miguel    EEG with diffuse waves    Past Medical History:   Diagnosis Date    Anemia     Anticoagulant long-term use     Atrial fibrillation     Bronchitis 03/01/2017    Cancer 2016    kidney cancer    CHF (congestive heart failure), NYHA class II, chronic, systolic     CMV (cytomegalovirus) antibody positive     CVA (cerebral vascular accident) 1/3/2024    Encounter for blood transfusion     ESRD (end stage renal disease)     Essential hypertension 09/23/2015    H/O herpes simplex type 2 infection     Herpes simplex type 1 antibody positive     History of kidney cancer     s/p left nephrectomy 1/2016    Hyperparathyroidism, unspecified     Hyperuricemia without signs of inflammatory arthritis and tophaceous disease     Kidney stones     LGSIL (low grade squamous intraepithelial dysplasia)     Myocardiopathy 07/21/2017    Prediabetes     Proteinuria     Renal disorder     Thyroid nodule     Urate nephropathy      Past Surgical History:   Procedure Laterality Date    BREAST CYST EXCISION      COLONOSCOPY N/A 11/12/2015    COLONOSCOPY N/A 03/12/2021    Procedure: COLONOSCOPY;  Surgeon: Brendon Lanier MD;  Location: [facility];  Service: Endoscopy;  Laterality: N/A;  covid test 3/9, labs prior, prep instr mailed -ml    EXCISION OF ARTERIOVENOUS FISTULA Left 09/27/2023    Procedure: EXCISION, AV FISTULA;  Surgeon: FARIDEH Muñoz III, MD;  Location: [facility] OR [address] [place] [address];  Service: Vascular;  Laterality: Left;  LUE AV graft excision    INSERTION OF BIVENTRICULAR IMPLANTABLE CARDIOVERTER-DEFIBRILLATOR (ICD)  04/2021    INSERTION OF TUNNELED CENTRAL VENOUS CATHETER " (CVC) WITH SUBCUTANEOUS PORT N/A 01/25/2023    Procedure: INSERTION, DUAL LUMEN CATHETER WITH PORT, WITH IMAGING GUIDANCE;  Surgeon: FARIDEH Muñoz III, MD;  Location: [facility] OR [facility];  Service: Peripheral Vascular;  Laterality: N/A;  possinble permcath placment    NEPHRECTOMY-LAPAROSCOPIC Left 01/12/2016    PERCUTANEOUS TRANSLUMINAL ANGIOPLASTY OF ARTERIOVENOUS FISTULA Left 04/19/2023    Procedure: PTA, AV FISTULA;  Surgeon: FARIDEH Muñoz III, MD;  Location: [facility] CATH LAB;  Service: Peripheral Vascular;  Laterality: Left;    PERITONEAL CATHETER INSERTION      Permacath insertion  01/12/2017    PLACEMENT OF ARTERIOVENOUS GRAFT  12/28/2022    Procedure: INSERTION, GRAFT, ARTERIOVENOUS;  Surgeon: FARIDEH Muñoz III, MD;  [facility];  Service: Peripheral Vascular;;    REMOVAL, GRAFT, ARTERIOVENOUS, UPPER EXTREMITY Left 01/25/2023    Procedure: REMOVAL, GRAFT, ARTERIOVENOUS, UPPER EXTREMITY;  Surgeon: FARIDEH Muñoz III, MD;  [facility];  Service: Peripheral Vascular;  Laterality: Left;    REVISION OF ARTERIOVENOUS FISTULA Left 05/01/2023    Procedure: REVISION, AV FISTULA;  Surgeon: FARIDEH Muñoz III, MD;  Location: [facility] OR [facility];  Service: Peripheral Vascular;  Laterality: Left;  LUE AVG revision    REVISION OF PROCEDURE INVOLVING ARTERIOVENOUS GRAFT Left 12/28/2022    Procedure: REVISION, PROCEDURE INVOLVING ARTERIOVENOUS GRAFT;  Surgeon: FARIDEH Muñoz III, MD;  [facility];  Service: Peripheral Vascular;  Laterality: Left;    REVISION OF PROCEDURE INVOLVING ARTERIOVENOUS GRAFT Left 07/21/2023    Procedure: LEFT ARM ARTERIOVENOUS GRAFT EXCISION  AND LIGATION;  Surgeon: Obi Werner MD;  Location: [facility] OR;  Service: Vascular;  Laterality: Left;  Left AVG excision and revision with interposition    SALPINGOOPHORECTOMY Right 2016    KJB---DAVINCI    TONSILLECTOMY      TUBAL LIGATION       Review of patient's allergies indicates:   Allergen Reactions     Carvedilol Other (See Comments)     Nausea/vomiting    Allopurinol      Other reaction(s): abnormal transaminases       Current Facility-Administered Medications   Medication Dose Route Frequency Provider Last Rate Last Admin    acetaminophen tablet 650 mg  650 mg Oral Q6H PRN José Miguel Cristina MD        apixaban tablet 2.5 mg  2.5 mg Oral BID José Miguel Cristina MD   2.5 mg at 04/18/24 2050    aspirin EC tablet 81 mg  81 mg Oral Daily José Miguel Cristina MD   81 mg at 04/18/24 0957    atorvastatin tablet 40 mg  40 mg Oral Daily José Miguel Cristina MD   40 mg at 04/18/24 0953    dextrose 10% bolus 125 mL 125 mL  12.5 g Intravenous PRN José Miguel Cristina MD        dextrose 10% bolus 250 mL 250 mL  25 g Intravenous PRN José Miguel Cristina MD        dextrose 5 % and 0.45 % NaCl infusion   Intravenous Continuous José Miguel Cristina MD 50 mL/hr at 04/18/24 1310 New Bag at 04/18/24 1310    famotidine tablet 20 mg  20 mg Oral Daily José Miguel Cristina MD   20 mg at 04/18/24 0954    glucagon (human recombinant) injection 1 mg  1 mg Intramuscular PRN José Miguel Cristina MD        glucose chewable tablet 16 g  16 g Oral PRN José Miguel Cristina MD        glucose chewable tablet 24 g  24 g Oral PRN José Miguel Cristina MD        levothyroxine tablet 75 mcg  75 mcg Oral Before breakfast José Miguel Cristina MD   75 mcg at 04/19/24 0534    metoprolol tartrate (LOPRESSOR) tablet 25 mg  25 mg Oral BID José Miguel Cristina MD   25 mg at 04/18/24 2050     Facility-Administered Medications Ordered in Other Encounters   Medication Dose Route Frequency Provider Last Rate Last Admin    0.9%  NaCl infusion   Intravenous Continuous Soni Bhatti MD   New Bag at 07/21/23 1116   ECho 3/23/      Show Result ComparisonThe left ventricle is normal in size with mild eccentric hypertrophy and severely decreased systolic function.  The estimated ejection fraction is 23%.  There is severe left  ventricular global hypokinesis.  Grade II left ventricular diastolic dysfunction.  Moderate left atrial enlargement.  Normal right ventricular size with normal right ventricular systolic function.  Moderate right atrial enlargement.  There is mild aortic valve stenosis.  Aortic valve area is 1.58 cm2; peak velocity is 2.01 m/s; mean gradient is 8 mmHg.  Moderate mitral regurgitation.  Moderate to severe tricuspid regurgitation.  Mild pulmonic regurgitation.  Elevated central venous pressure (15 mmHg).  The estimated PA systolic pressure is 47 mmHg.  There is mild-moderate pulmonary hypertension.  Moderate posterolateral pericardial effusion. Trivial under the RA    LABS    Recent Results (from the past 24 hour(s))   POCT glucose    Collection Time: 04/18/24 12:02 PM   Result Value Ref Range    POCT Glucose 160 (H) 70 - 110 mg/dL   POCT glucose    Collection Time: 04/18/24  3:46 PM   Result Value Ref Range    POCT Glucose 129 (H) 70 - 110 mg/dL   POCT glucose    Collection Time: 04/18/24  7:48 PM   Result Value Ref Range    POCT Glucose 125 (H) 70 - 110 mg/dL   POCT glucose    Collection Time: 04/19/24  7:58 AM   Result Value Ref Range    POCT Glucose 108 70 - 110 mg/dL   ]    I/O last 3 completed shifts:  In: 120 [P.O.:120]  Out: 2 [Stool:2]    Vitals:    04/19/24 0420 04/19/24 0441 04/19/24 0733 04/19/24 0758   BP: 116/75   121/73   Pulse: 84  76 75   Resp: 18   18   Temp: 98.1 °F (36.7 °C)   97.9 °F (36.6 °C)   TempSrc: Oral   Oral   SpO2: (!) 88% 97%  97%   Weight:       Height:           Pos Jvd, No Thyromegaly or Lymphadenopathy  Lungs: Fairly clear anteriorly and laterally  Cor: RRR no G or rubs  Abd: Soft benign good bowel sounds non tender  Ext: No E C C    A)    ESRD x 8 y Normal HD TTS this week mwf seems to be well dialyzed   AMS with diffuse slow eeg waves  Hypoglycemia ( in esrd patient might be an ominous sign)   Anemia   Hx of cva ? New CVA  Hx of prediabetes  Hypercalcemia ( mild (<12)   Elevated  "total protein     P)    Renal Diet when able   Home meds when able  Protect access  HD might need hd low intensity today  EPO prn   Binders prn   Adjust all meds to the degree of renal fx  Close follow up I/O and weights  Maintain Hydration     Tx for hypercalcemia some times include IVF, biphosphonate and calcitonin. Of those 3 calcitonin is most "kidney friendly patient". The casue for Hypercalcemia might be related to her cancer        "

## 2024-04-19 NOTE — SUBJECTIVE & OBJECTIVE
Interval History: .imagines and labs show no acute process,acute metabolic encephalopathy  seems improving,consulted neurology,ordered EEG.,show no seizure activity,but metabolic  encephalopathy,MRI can not be done duo to AICD.Discontinued Abx,no sign of infection is seen.patient seems is delirious,per ,some improvement.    Review of Systems   Constitutional:  Positive for activity change and appetite change.   Respiratory:  Negative for apnea and chest tightness.    Gastrointestinal:  Negative for abdominal distention and abdominal pain.   Endocrine: Negative for cold intolerance and heat intolerance.   Genitourinary:  Negative for difficulty urinating and dyspareunia.   Neurological:  Positive for weakness.   Psychiatric/Behavioral:  Positive for confusion.      Objective:     Vital Signs (Most Recent):  Temp: 97.9 °F (36.6 °C) (04/19/24 0758)  Pulse: 75 (04/19/24 0758)  Resp: 18 (04/19/24 0758)  BP: 121/73 (04/19/24 0758)  SpO2: 97 % (04/19/24 0758) Vital Signs (24h Range):  Temp:  [97.5 °F (36.4 °C)-98.5 °F (36.9 °C)] 97.9 °F (36.6 °C)  Pulse:  [71-87] 75  Resp:  [18] 18  SpO2:  [88 %-99 %] 97 %  BP: (116-140)/(73-85) 121/73     Weight: 48.7 kg (107 lb 5.8 oz)  Body mass index is 18.43 kg/m².    Intake/Output Summary (Last 24 hours) at 4/19/2024 1009  Last data filed at 4/18/2024 1222  Gross per 24 hour   Intake 0 ml   Output 1 ml   Net -1 ml         Physical Exam  HENT:      Left Ear: There is no impacted cerumen.   Cardiovascular:      Rate and Rhythm: Normal rate and regular rhythm.   Pulmonary:      Effort: No respiratory distress.      Breath sounds: No wheezing.   Abdominal:      General: There is no distension.   Musculoskeletal:         General: No swelling.   Neurological:      Mental Status: She is alert. She is disoriented.             Significant Labs: All pertinent labs within the past 24 hours have been reviewed.    Significant Imaging: I have reviewed all pertinent imaging results/findings  within the past 24 hours.

## 2024-04-19 NOTE — PT/OT/SLP PROGRESS
Physical Therapy      Patient Name:  Eva Bloom   MRN:  [id number]  First Attempt 0958:  Patient not seen today secondary to Dialysis.   Second Attempt 0209: Wound care nurse present. Will follow-up as able.

## 2024-04-19 NOTE — ASSESSMENT & PLAN NOTE
Patient is lethargic today,code stroke was called,transfered to ICU,head CT show no acute process,evaluated by Tele neuro,will have CTA head,check ammonia and TSH,hypoglycemia is resolved,will monitor closely..imagines and labs show no acute process,acute metabolic encephalopathy  seems improving,consulted neurology,ordered EEG.show no seizure activity,but metabolic  encephalopathy,MRI can not be done duo to AICD.Discontinued Abx,no sign of infection is seen.patient seems is delirious,per ,some improvement.

## 2024-04-19 NOTE — PLAN OF CARE
Problem: Adult Inpatient Plan of Care  Goal: Plan of Care Review  Outcome: Ongoing, Progressing  Flowsheets (Taken 4/19/2024 1629)  Plan of Care Reviewed With: spouse  Goal: Patient-Specific Goal (Individualized)  Outcome: Ongoing, Progressing  Goal: Absence of Hospital-Acquired Illness or Injury  Outcome: Ongoing, Progressing  Goal: Optimal Comfort and Wellbeing  Outcome: Ongoing, Progressing  Goal: Readiness for Transition of Care  Outcome: Ongoing, Progressing     Problem: Adjustment to Illness (Sepsis/Septic Shock)  Goal: Optimal Coping  Outcome: Ongoing, Progressing     Problem: Bleeding (Sepsis/Septic Shock)  Goal: Absence of Bleeding  Outcome: Ongoing, Progressing     Problem: Glycemic Control Impaired (Sepsis/Septic Shock)  Goal: Blood Glucose Level Within Desired Range  Outcome: Ongoing, Progressing  Intervention: Optimize Glycemic Control  Flowsheets (Taken 4/19/2024 3289)  Glycemic Management: blood glucose monitored     Problem: Infection Progression (Sepsis/Septic Shock)  Goal: Absence of Infection Signs and Symptoms  Outcome: Ongoing, Progressing     Problem: Nutrition Impaired (Sepsis/Septic Shock)  Goal: Optimal Nutrition Intake  Outcome: Ongoing, Progressing     Problem: Infection  Goal: Absence of Infection Signs and Symptoms  Outcome: Ongoing, Progressing     Problem: Fall Injury Risk  Goal: Absence of Fall and Fall-Related Injury  Outcome: Ongoing, Progressing     Problem: Skin Injury Risk Increased  Goal: Skin Health and Integrity  Outcome: Ongoing, Progressing  Intervention: Optimize Skin Protection  Flowsheets (Taken 4/19/2024 8172)  Pressure Reduction Techniques: frequent weight shift encouraged     Problem: Device-Related Complication Risk (Hemodialysis)  Goal: Safe, Effective Therapy Delivery  Outcome: Ongoing, Progressing     Problem: Hemodynamic Instability (Hemodialysis)  Goal: Effective Tissue Perfusion  Outcome: Ongoing, Progressing     Problem: Infection (Hemodialysis)  Goal:  Absence of Infection Signs and Symptoms  Outcome: Ongoing, Progressing

## 2024-04-20 LAB
ANION GAP SERPL CALC-SCNC: 13 MMOL/L (ref 8–16)
BASOPHILS # BLD AUTO: 0.04 K/UL (ref 0–0.2)
BASOPHILS NFR BLD: 0.9 % (ref 0–1.9)
BUN SERPL-MCNC: 12 MG/DL (ref 6–20)
CALCIUM SERPL-MCNC: 10.6 MG/DL (ref 8.7–10.5)
CHLORIDE SERPL-SCNC: 103 MMOL/L (ref 95–110)
CO2 SERPL-SCNC: 21 MMOL/L (ref 23–29)
CREAT SERPL-MCNC: 3.7 MG/DL (ref 0.5–1.4)
DIFFERENTIAL METHOD BLD: ABNORMAL
EOSINOPHIL # BLD AUTO: 0.1 K/UL (ref 0–0.5)
EOSINOPHIL NFR BLD: 3.3 % (ref 0–8)
ERYTHROCYTE [DISTWIDTH] IN BLOOD BY AUTOMATED COUNT: 22.2 % (ref 11.5–14.5)
EST. GFR  (NO RACE VARIABLE): 14 ML/MIN/1.73 M^2
GLUCOSE SERPL-MCNC: 95 MG/DL (ref 70–110)
HCT VFR BLD AUTO: 39 % (ref 37–48.5)
HGB BLD-MCNC: 12 G/DL (ref 12–16)
IMM GRANULOCYTES # BLD AUTO: 0.02 K/UL (ref 0–0.04)
IMM GRANULOCYTES NFR BLD AUTO: 0.5 % (ref 0–0.5)
LYMPHOCYTES # BLD AUTO: 0.6 K/UL (ref 1–4.8)
LYMPHOCYTES NFR BLD: 13.5 % (ref 18–48)
MCH RBC QN AUTO: 28.5 PG (ref 27–31)
MCHC RBC AUTO-ENTMCNC: 30.8 G/DL (ref 32–36)
MCV RBC AUTO: 93 FL (ref 82–98)
MONOCYTES # BLD AUTO: 0.5 K/UL (ref 0.3–1)
MONOCYTES NFR BLD: 11.6 % (ref 4–15)
NEUTROPHILS # BLD AUTO: 3 K/UL (ref 1.8–7.7)
NEUTROPHILS NFR BLD: 70.2 % (ref 38–73)
NRBC BLD-RTO: 1 /100 WBC
PLATELET # BLD AUTO: 136 K/UL (ref 150–450)
PMV BLD AUTO: 10.7 FL (ref 9.2–12.9)
POCT GLUCOSE: 103 MG/DL (ref 70–110)
POCT GLUCOSE: 132 MG/DL (ref 70–110)
POCT GLUCOSE: 96 MG/DL (ref 70–110)
POCT GLUCOSE: 96 MG/DL (ref 70–110)
POTASSIUM SERPL-SCNC: 3.8 MMOL/L (ref 3.5–5.1)
RBC # BLD AUTO: 4.21 M/UL (ref 4–5.4)
SODIUM SERPL-SCNC: 137 MMOL/L (ref 136–145)
WBC # BLD AUTO: 4.3 K/UL (ref 3.9–12.7)

## 2024-04-20 PROCEDURE — 94761 N-INVAS EAR/PLS OXIMETRY MLT: CPT

## 2024-04-20 PROCEDURE — 85025 COMPLETE CBC W/AUTO DIFF WBC: CPT | Performed by: HOSPITALIST

## 2024-04-20 PROCEDURE — 25000003 PHARM REV CODE 250: Performed by: HOSPITALIST

## 2024-04-20 PROCEDURE — 21400001 HC TELEMETRY ROOM

## 2024-04-20 PROCEDURE — 80048 BASIC METABOLIC PNL TOTAL CA: CPT | Performed by: HOSPITALIST

## 2024-04-20 PROCEDURE — 36415 COLL VENOUS BLD VENIPUNCTURE: CPT | Performed by: HOSPITALIST

## 2024-04-20 PROCEDURE — 80100016 HC MAINTENANCE HEMODIALYSIS

## 2024-04-20 RX ADMIN — APIXABAN 2.5 MG: 2.5 TABLET, FILM COATED ORAL at 11:04

## 2024-04-20 RX ADMIN — ATORVASTATIN CALCIUM 40 MG: 40 TABLET, FILM COATED ORAL at 09:04

## 2024-04-20 RX ADMIN — ASPIRIN 81 MG: 81 TABLET, COATED ORAL at 09:04

## 2024-04-20 RX ADMIN — FAMOTIDINE 20 MG: 20 TABLET ORAL at 09:04

## 2024-04-20 RX ADMIN — APIXABAN 2.5 MG: 2.5 TABLET, FILM COATED ORAL at 09:04

## 2024-04-20 RX ADMIN — LEVOTHYROXINE SODIUM 75 MCG: 75 TABLET ORAL at 05:04

## 2024-04-20 NOTE — DISCHARGE SUMMARY
[facility] Medicine  Discharge Summary      Patient Name: Eva Bloom  MRN: [id number]  TRENT: [id number]  Patient Class: IP- Inpatient  Admission Date: 4/13/2024  Hospital Length of Stay: 7 days  Discharge Date and Time: 04/20/24    Attending Physician: Song Gao MD   Discharging Provider: Song Gao MD  Primary Care Provider: Sowmya Mccain MD    Primary Care Team: Networked reference to record PCT     HPI:   This is a 58 year old female, with a Anemia, Atrial fibrillation, CHF NYHA class II, chronic, systolic, CVA, ESRD on dialysis (Tues, Thurs, Sat) with completion of session today, Essential HTN, Hx of kidney cancer, Hyperparathyroidism, Myocardiopathy, and Prediabetes, who presents to the ED complaining of Palpitations, symptoms onset PTA. Patient reports cough, sleep disturbance, and generalized pruritus. Patient states she was at dialysis PTA and receiving dialysis when her heart rate jumped into the 120's and the dialysis nurses instructed her to present to the ER. Patient also states she has been on dialysis for 8 years and that she does not make urine. Patient further states she is on 2.5 mg of Eliquis. Additional history obtained from independent historian: patient's  states that in July last year she had a spot on her left foot and had a line inserted. Patient denies Hx of irregular heart rate or MI. Patient also denies rhinorrhea, sore throat, abdominal pain, emesis, diarrhea, or other associated symptoms. No other alleviating or exacerbating factors. This is the extent of the patient's complaints in the ED.patient is febrile in .8,tachycardic 120.sepsis likely  duo to l;ine infection,patient has been started on broad  spectrum IV Abx,blood culture is pending.    * No surgery found *      Hospital Course:   This is a 58 year old female, with a Anemia, Atrial fibrillation, CHF NYHA class II, chronic, systolic, CVA, ESRD on dialysis (Tues, Thurs,  Sat) with completion of session today, Essential HTN, Hx of kidney cancer, Hyperparathyroidism, Myocardiopathy, and Prediabetes, who presents to the ED complaining of Palpitations, symptoms onset PTA. Patient reports cough, sleep disturbance, and generalized pruritus. Patient states she was at dialysis PTA and receiving dialysis when her heart rate jumped into the 120's and the dialysis nurses instructed her to present to the ER. .patient is febrile in .8,tachycardic 120.sepsis likely  duo to l;ine infection,patient has been started on broad  spectrum IV Abx,blood culture is pending.HR and BP is improved at this time.cultures sofar negative.  Patient is lethargic today,code stroke was called,transfered to ICU,head CT show no acute process,evaluated by Tele neuro,will have CTA head,check ammonia and TSH,hypoglycemia is resolved,will monitor closely.imagines and labs show no acute process,acute metabolic encephalopathy  seems improving,consulted neurology,ordered EEG.show no seizure activity,but metabolic  encephalopathy,MRI can not be done duo to AICD.patient seems is deliriums,per \d,some improvement.  Discontinued Abx,no sign of infection is seen.   On discharge day, patient's family states that patient's confusion is much improved and they desire to go home.  Patient is for discharged on 4/20/24 but the family asked if they can stay for tonight and they will leave early in the morning. JAZMIN Ruiz informed about it and she spoke to Dr. Joya and agree that they can stay for tonight and can leave first thing in the morning. Patient and Spouse informed.     Goals of Care Treatment Preferences:  Code Status: Full Code    Health care agent: Otis Bloom ()  Health care agent number: [phone] cell          What is most important right now is to focus on spending time at home, avoiding the hospital, remaining as independent as possible, quality of life, even if it means sacrificing a little  time, improvement in condition but with limits to invasive therapies, comfort and QOL .  Accordingly, we have decided that the best plan to meet the patient's goals includes continuing with treatment, enrolling in hospice care.      Consults:   Consults (From admission, onward)          Status Ordering Provider     Inpatient consult to Social Work  Once        Provider:  (Not yet assigned)    Completed JALEESA ROLDAN     Inpatient consult to Neurology  Once        Provider:  Chris Roman MD    Completed JALEESA ROLDAN     Inpatient consult to Interventional Radiology  Once        Provider:  (Not yet assigned)    Completed JALEESA ROLDAN     Inpatient consult to Nephrology  Once        Provider:  Albina Bermudez MD    Acknowledged JALEESA ROLDAN            No new Assessment & Plan notes have been filed under this hospital service since the last note was generated.  Service: Hospital Medicine    Final Active Diagnoses:    Diagnosis Date Noted POA    PRINCIPAL PROBLEM:  Sepsis [A41.9] 10/29/2022 Yes    Pressure injury of left heel, unstageable [L89.620] 04/16/2024 Yes    Somnolence [R40.0] 04/15/2024 Yes    Acute metabolic encephalopathy [G93.41] 01/02/2024 Yes    PAD (peripheral artery disease) [I73.9] 11/01/2023 Yes    Severe protein-calorie malnutrition [E43] 10/11/2023 Yes    Body mass index (BMI) less than 19 [Z68.1] 09/28/2023 Not Applicable    Hypothyroidism [E03.9] 07/19/2023 Yes     Chronic    PAF (paroxysmal atrial fibrillation) [I48.0] 07/25/2022 Yes     Chronic    ICD (implantable cardioverter-defibrillator) in place - SubQ [Z95.810] 07/15/2021 Yes    Chronic combined systolic and diastolic congestive heart failure [I50.42]  Yes    Pulmonary hypertension [I27.20] 07/21/2017 Yes    Renal cell carcinoma of left kidney [C64.2] 01/27/2016 Yes    Essential hypertension [I10] 09/23/2015 Yes     Chronic      Problems Resolved During this Admission:       Discharged Condition:  good    Disposition:     Follow Up:   Follow-up Information       Sowmya Mccain MD Follow up.    Specialties: Internal Medicine, Pediatrics  Contact information:  [id number] [phone]                           Patient Instructions:   No discharge procedures on file.    Significant Diagnostic Studies:    Latest Reference Range & Units 04/15/24 04:54 04/16/24 04:01 04/17/24 05:06 04/18/24 04:47 04/20/24 07:06   Sodium 136 - 145 mmol/L 141 136 136 133 (L) 137   Potassium 3.5 - 5.1 mmol/L 5.1 3.6 4.4 3.8 3.8   Chloride 95 - 110 mmol/L 104 99 96 98 103   CO2 23 - 29 mmol/L 11 (L) 23 18 (L) 20 (L) 21 (L)   Anion Gap 8 - 16 mmol/L 26 (H) 14 22 (H) 15 13   BUN 6 - 20 mg/dL 56 (H) 26 (H) 34 (H) 16 12   Creatinine 0.5 - 1.4 mg/dL 6.4 (H) 4.2 (H) 5.6 (H) 3.8 (H) 3.7 (H)   eGFR >60 mL/min/1.73 m^2 7 ! 12 ! 8 ! 13 ! 14 !   Glucose 70 - 110 mg/dL 34 (LL) 71 107 101 95   Calcium 8.7 - 10.5 mg/dL 10.8 (H) 11.5 (H) 10.5 10.4 10.6 (H)      Latest Reference Range & Units 04/15/24 10:34 04/16/24 08:28   Ammonia 10 - 50 umol/L 36 29      Latest Reference Range & Units 04/13/24 11:04 04/14/24 04:41 04/15/24 04:54 04/16/24 04:01 04/18/24 04:47 04/20/24 04:42   WBC 3.90 - 12.70 K/uL 4.12 7.97 7.99 5.69 4.61 4.30   RBC 4.00 - 5.40 M/uL 4.58 3.94 (L) 4.16 3.90 (L) 3.81 (L) 4.21   Hemoglobin 12.0 - 16.0 g/dL 12.9 11.1 (L) 11.7 (L) 11.2 (L) 10.7 (L) 12.0   Hematocrit 37.0 - 48.5 % 42.4 37.2 40.5 36.4 (L) 35.6 (L) 39.0   MCV 82 - 98 fL 93 94 97 93 93 93   MCH 27.0 - 31.0 pg 28.2 28.2 28.1 28.7 28.1 28.5   MCHC 32.0 - 36.0 g/dL 30.4 (L) 29.8 (L) 28.9 (L) 30.8 (L) 30.1 (L) 30.8 (L)   RDW 11.5 - 14.5 % 22.3 (H) 22.1 (H) 22.0 (H) 21.5 (H) 21.7 (H) 22.2 (H)   Platelet Count 150 - 450 K/uL 152 143 (L) 189 172 131 (L) 136 (L)   MPV 9.2 - 12.9 fL 11.8 11.2 11.0 10.8 12.5 10.7   Gran % 38.0 - 73.0 % 64.8 67.1 60.5 69.3 69.4 70.2   Lymph % 18.0 - 48.0 % 19.9 16.1 (L) 20.0 14.2 (L) 15.8 (L) 13.5 (L)   Mono % 4.0 - 15.0 %  9.2 13.3 14.3 11.2 10.6 11.6   Eos % 0.0 - 8.0 % 4.4 1.9 2.8 3.3 2.2 3.3   Basophil % 0.0 - 1.9 % 1.2 0.8 1.5 1.6 1.3 0.9   Immature Granulocytes 0.0 - 0.5 % 0.5 0.8 (H) 0.9 (H) 0.4 0.7 (H) 0.5   Gran # (ANC) 1.8 - 7.7 K/uL 2.7 5.4 4.8 3.9 3.2 3.0   Lymph # 1.0 - 4.8 K/uL 0.8 (L) 1.3 1.6 0.8 (L) 0.7 (L) 0.6 (L)   Mono # 0.3 - 1.0 K/uL 0.4 1.1 (H) 1.1 (H) 0.6 0.5 0.5   Eos # 0.0 - 0.5 K/uL 0.2 0.2 0.2 0.2 0.1 0.1   Baso # 0.00 - 0.20 K/uL 0.05 0.06 0.12 0.09 0.06 0.04   Immature Grans (Abs) 0.00 - 0.04 K/uL 0.02 0.06 (H) 0.07 (H) 0.02 0.03 0.02   nRBC 0 /100 WBC 1 ! 1 ! 1 ! 1 ! 2 ! 1 !   Differential Method  Automated Automated Automated Automated Automated Automated     CTA Head and Neck 4/15/24 Impression:     Severely limited CTA, degraded by patient motion and suboptimal bolus timing with minimal enhancement of the intracranial vasculature.     Calcification in the superior division left MCA, possible calcific embolus.  This is unchanged from prior study 01/02/2024.     Additional atherosclerotic change elsewhere.     No definite new high-grade stenosis or large vessel occlusion.     Further assessment as warranted clinically.       CT HEAD WITHOUT CONTRAST 4/15/24 Impression:     No CT evidence of acute intracranial abnormality, allowing for motion.  If the patient has an acute, focal neurological deficit, MRI of the brain may be indicated.     Generalized cerebral volume loss and chronic ischemic changes.     Chronic appearing paranasal sinus disease.  Trace fluid in the bilateral mastoid air cells.    XR CHEST 1 VIEW 4/15/24 FINDINGS:  Large-bore central venous catheter in stable position with tip expected over the right atrium.  Pacing device over the left chest.  The cardiomediastinal silhouette remains enlarged similar to prior.  Mild pulmonary vascular engorgement and prominent interstitial markings, as before.  No new confluent parenchymal opacity.  No new large volume pleural fluid or pneumothorax.      Pending Diagnostic Studies:       None           Medications:  Reconciled Home Medications:      Medication List        CONTINUE taking these medications      * acetaminophen 500 MG tablet  Commonly known as: TYLENOL  Take 500 mg by mouth every 6 (six) hours as needed for Pain.     * acetaminophen 500 MG tablet  Commonly known as: TYLENOL  Take 1 tablet (500 mg total) by mouth every 4 (four) hours as needed for Pain or Temperature greater than (100.5 or greater).     albuterol-ipratropium 2.5 mg-0.5 mg/3 mL nebulizer solution  Commonly known as: DUO-NEB  Take by nebulization.     apixaban 2.5 mg Tab  Commonly known as: ELIQUIS  Take 1 tablet (2.5 mg total) by mouth 2 (two) times daily.     aspirin 81 MG EC tablet  Commonly known as: ECOTRIN  Take 1 tablet (81 mg total) by mouth once daily.     atorvastatin 40 MG tablet  Commonly known as: LIPITOR  Take 1 tablet (40 mg total) by mouth once daily.     bisacodyL 10 mg Supp  Commonly known as: DULCOLAX  Place 10 mg rectally.     CABOMETYX 60 mg Tab  Generic drug: cabozantinib  TAKE ONE TABLET BY MOUTH ONCE DAILY AT THE SAME TIME ON AN EMPTY STOMACH AT LEAST 1 HOUR BEFORE OR 2 HOURS AFTER EATING. AVOID GRAPEFRUIT PRODUCTS     FosrenoL 1000 MG chewable tablet  Generic drug: lanthanum  Take 1 tablet by mouth.     hyoscyamine 0.125 mg Subl  Commonly known as: Levsin  Place under the tongue.     levothyroxine 75 MCG tablet  Commonly known as: SYNTHROID  TAKE 1 TABLET BY MOUTH EVERY DAY     LIDOcaine-prilocaine cream  Commonly known as: EMLA  APPLY ATLEAST 30 MINUTES BEFORE TREATMENT 3 TIMES A WEEK     LORazepam IntensoL 2 mg/mL Conc  Generic drug: LORazepam  Take by mouth.     metoprolol succinate 25 MG 24 hr tablet  Commonly known as: TOPROL-XL  Take 1 tablet (25 mg total) by mouth once daily.     mirtazapine 7.5 MG Tab  Commonly known as: REMERON  Take 1 tablet (7.5 mg total) by mouth every evening.     naloxone 1 mg/mL injection  Commonly known as: NARCAN  2 mg (1 mg  per nostril) by Nasal route as needed for opioid overdose; may repeat in 3 to 5 minutes if not effective. Call 911     nystatin cream  Commonly known as: MYCOSTATIN  SMARTSIG:sparingly Topical 2-4 Times Daily PRN     ondansetron 4 MG Tbdl  Commonly known as: ZOFRAN-ODT  Take by mouth.     ONE-A-DAY WOMEN'S 50 PLUS 400-20 mcg Tab  Generic drug: mv,Ca,min-folic acid-vit K1  Take 1 tablet by mouth once daily.     oxyCODONE 5 MG immediate release tablet  Commonly known as: ROXICODONE  Take 1 tablet (5 mg total) by mouth every 6 (six) hours as needed for Pain.     pantoprazole 40 MG tablet  Commonly known as: PROTONIX  Take 1 tablet (40 mg total) by mouth 2 (two) times daily.     RETACRIT INJ  Epoetin david - epbx (Retacrit)     traMADoL 50 mg tablet  Commonly known as: ULTRAM  Take 1 tablet (50 mg total) by mouth every 12 (twelve) hours as needed for Pain.           * This list has 2 medication(s) that are the same as other medications prescribed for you. Read the directions carefully, and ask your doctor or other care provider to review them with you.                STOP taking these medications      cyclobenzaprine 5 MG tablet  Commonly known as: FLEXERIL              Indwelling Lines/Drains at time of discharge:   Lines/Drains/Airways       Central Venous Catheter Line  Duration                  Hemodialysis Catheter left subclavian -- days                    Time spent on the discharge of patient: 40 minutes         Song Gao MD  Department of [facility]  [facility] - Telemetry

## 2024-04-20 NOTE — PT/OT/SLP PROGRESS
Speech Language Pathology      Eva Bloom  MRN: [id number]    Patient not seen today secondary to Unarousable. Will follow-up when pt is awake/alert and able to safely accept PO .

## 2024-04-20 NOTE — PROGRESS NOTES
"alert today interacting normally with me    Has no idea as what happened to her    Denies cns ent cp gi gu rhem or derm sx    Wants to go home     CXR terrible cardiomegaly     Hypercalcemia noted 11.5 alb 3.5     at bedside He tells me that she has hx of cancer was on "pills". Her Dr name is Miguel        Past Medical History:   Diagnosis Date    Anemia     Anticoagulant long-term use     Atrial fibrillation     Bronchitis 03/01/2017    Cancer 2016    kidney cancer    CHF (congestive heart failure), NYHA class II, chronic, systolic     CMV (cytomegalovirus) antibody positive     CVA (cerebral vascular accident) 1/3/2024    Encounter for blood transfusion     ESRD (end stage renal disease)     Essential hypertension 09/23/2015    H/O herpes simplex type 2 infection     Herpes simplex type 1 antibody positive     History of kidney cancer     s/p left nephrectomy 1/2016    Hyperparathyroidism, unspecified     Hyperuricemia without signs of inflammatory arthritis and tophaceous disease     Kidney stones     LGSIL (low grade squamous intraepithelial dysplasia)     Myocardiopathy 07/21/2017    Prediabetes     Proteinuria     Renal disorder     Thyroid nodule     Urate nephropathy      Past Surgical History:   Procedure Laterality Date    BREAST CYST EXCISION      COLONOSCOPY N/A 11/12/2015    COLONOSCOPY N/A 03/12/2021    Procedure: COLONOSCOPY;  Surgeon: Brendon Lanier MD;  Location: [facility];  Service: Endoscopy;  Laterality: N/A;  covid test 3/9, labs prior, prep instr mailed -ml    EXCISION OF ARTERIOVENOUS FISTULA Left 09/27/2023    Procedure: EXCISION, AV FISTULA;  Surgeon: FARIDEH Muñoz III, MD;  Location: [facility] OR [address] [place] [address];  Service: Vascular;  Laterality: Left;  LUE AV graft excision    INSERTION OF BIVENTRICULAR IMPLANTABLE CARDIOVERTER-DEFIBRILLATOR (ICD)  04/2021    INSERTION OF TUNNELED CENTRAL VENOUS CATHETER (CVC) WITH SUBCUTANEOUS PORT N/A 01/25/2023    Procedure: INSERTION, DUAL LUMEN " CATHETER WITH PORT, WITH IMAGING GUIDANCE;  Surgeon: FARIDEH Muñoz III, MD;  Location: [facility] OR [facility] FLR;  Service: Peripheral Vascular;  Laterality: N/A;  possinble permcath placment    NEPHRECTOMY-LAPAROSCOPIC Left 01/12/2016    PERCUTANEOUS TRANSLUMINAL ANGIOPLASTY OF ARTERIOVENOUS FISTULA Left 04/19/2023    Procedure: PTA, AV FISTULA;  Surgeon: FARIDEH Muñoz III, MD;  Location: [facility] CATH LAB;  Service: Peripheral Vascular;  Laterality: Left;    PERITONEAL CATHETER INSERTION      Permacath insertion  01/12/2017    PLACEMENT OF ARTERIOVENOUS GRAFT  12/28/2022    Procedure: INSERTION, GRAFT, ARTERIOVENOUS;  Surgeon: FARIDEH Muñoz III, MD;  Location: [facility] OR [facility] FLR;  Service: Peripheral Vascular;;    REMOVAL, GRAFT, ARTERIOVENOUS, UPPER EXTREMITY Left 01/25/2023    Procedure: REMOVAL, GRAFT, ARTERIOVENOUS, UPPER EXTREMITY;  Surgeon: FARIDEH Muñoz III, MD;  [facility];  Service: Peripheral Vascular;  Laterality: Left;    REVISION OF ARTERIOVENOUS FISTULA Left 05/01/2023    Procedure: REVISION, AV FISTULA;  Surgeon: FARIDEH Muñoz III, MD;  [facility];  Service: Peripheral Vascular;  Laterality: Left;  LUE AVG revision    REVISION OF PROCEDURE INVOLVING ARTERIOVENOUS GRAFT Left 12/28/2022    Procedure: REVISION, PROCEDURE INVOLVING ARTERIOVENOUS GRAFT;  Surgeon: FARIDEH Muñoz III, MD;  [facility];  Service: Peripheral Vascular;  Laterality: Left;    REVISION OF PROCEDURE INVOLVING ARTERIOVENOUS GRAFT Left 07/21/2023    Procedure: LEFT ARM ARTERIOVENOUS GRAFT EXCISION  AND LIGATION;  Surgeon: Obi Werner MD;  Location: [facility];  Service: Vascular;  Laterality: Left;  Left AVG excision and revision with interposition    SALPINGOOPHORECTOMY Right 2016    KJB---DAVINCI    TONSILLECTOMY      TUBAL LIGATION       Review of patient's allergies indicates:   Allergen Reactions    Carvedilol Other (See Comments)     Nausea/vomiting    Allopurinol      Other  reaction(s): abnormal transaminases       Current Facility-Administered Medications   Medication Dose Route Frequency Provider Last Rate Last Admin    acetaminophen tablet 650 mg  650 mg Oral Q6H PRN José Miguel Cristina MD        apixaban tablet 2.5 mg  2.5 mg Oral BID José Miguel Cristina MD   2.5 mg at 04/20/24 0907    aspirin EC tablet 81 mg  81 mg Oral Daily José Miguel Cristina MD   81 mg at 04/20/24 0907    atorvastatin tablet 40 mg  40 mg Oral Daily José Miguel Cristina MD   40 mg at 04/20/24 0907    dextrose 10% bolus 125 mL 125 mL  12.5 g Intravenous PRN José Miguel Cristina MD        dextrose 10% bolus 250 mL 250 mL  25 g Intravenous PRN José Miguel Cristina MD        dextrose 5 % and 0.45 % NaCl infusion   Intravenous Continuous José Miguel Cristina MD 50 mL/hr at 04/18/24 1310 New Bag at 04/18/24 1310    famotidine tablet 20 mg  20 mg Oral Daily José Miguel Cristina MD   20 mg at 04/20/24 0907    glucagon (human recombinant) injection 1 mg  1 mg Intramuscular PRN José Miguel Cristina MD        glucose chewable tablet 16 g  16 g Oral PRN José Miguel Cristina MD        glucose chewable tablet 24 g  24 g Oral PRN José Miguel Cristina MD        levothyroxine tablet 75 mcg  75 mcg Oral Before breakfast José Miguel Cristina MD   75 mcg at 04/20/24 0530    metoprolol tartrate (LOPRESSOR) tablet 25 mg  25 mg Oral BID José Miguel Cristina MD   25 mg at 04/19/24 2258    sodium chloride 0.9% bolus 250 mL 250 mL  250 mL Intravenous PRN Claude Allan MD         Facility-Administered Medications Ordered in Other Encounters   Medication Dose Route Frequency Provider Last Rate Last Admin    0.9%  NaCl infusion   Intravenous Continuous Soni Bhatti MD   New Bag at 07/21/23 1116   ECho 3/23/      Show Result ComparisonThe left ventricle is normal in size with mild eccentric hypertrophy and severely decreased systolic function.  The estimated ejection fraction is  23%.  There is severe left ventricular global hypokinesis.  Grade II left ventricular diastolic dysfunction.  Moderate left atrial enlargement.  Normal right ventricular size with normal right ventricular systolic function.  Moderate right atrial enlargement.  There is mild aortic valve stenosis.  Aortic valve area is 1.58 cm2; peak velocity is 2.01 m/s; mean gradient is 8 mmHg.  Moderate mitral regurgitation.  Moderate to severe tricuspid regurgitation.  Mild pulmonic regurgitation.  Elevated central venous pressure (15 mmHg).  The estimated PA systolic pressure is 47 mmHg.  There is mild-moderate pulmonary hypertension.  Moderate posterolateral pericardial effusion. Trivial under the RA    LABS    Recent Results (from the past 24 hour(s))   POCT glucose    Collection Time: 04/19/24  4:16 PM   Result Value Ref Range    POCT Glucose 102 70 - 110 mg/dL   CBC Auto Differential    Collection Time: 04/20/24  4:42 AM   Result Value Ref Range    WBC 4.30 3.90 - 12.70 K/uL    RBC 4.21 4.00 - 5.40 M/uL    Hemoglobin 12.0 12.0 - 16.0 g/dL    Hematocrit 39.0 37.0 - 48.5 %    MCV 93 82 - 98 fL    MCH 28.5 27.0 - 31.0 pg    MCHC 30.8 (L) 32.0 - 36.0 g/dL    RDW 22.2 (H) 11.5 - 14.5 %    Platelets 136 (L) 150 - 450 K/uL    MPV 10.7 9.2 - 12.9 fL    Immature Granulocytes 0.5 0.0 - 0.5 %    Gran # (ANC) 3.0 1.8 - 7.7 K/uL    Immature Grans (Abs) 0.02 0.00 - 0.04 K/uL    Lymph # 0.6 (L) 1.0 - 4.8 K/uL    Mono # 0.5 0.3 - 1.0 K/uL    Eos # 0.1 0.0 - 0.5 K/uL    Baso # 0.04 0.00 - 0.20 K/uL    nRBC 1 (A) 0 /100 WBC    Gran % 70.2 38.0 - 73.0 %    Lymph % 13.5 (L) 18.0 - 48.0 %    Mono % 11.6 4.0 - 15.0 %    Eosinophil % 3.3 0.0 - 8.0 %    Basophil % 0.9 0.0 - 1.9 %    Differential Method Automated    Basic Metabolic Panel    Collection Time: 04/20/24  7:06 AM   Result Value Ref Range    Sodium 137 136 - 145 mmol/L    Potassium 3.8 3.5 - 5.1 mmol/L    Chloride 103 95 - 110 mmol/L    CO2 21 (L) 23 - 29 mmol/L    Glucose 95 70 - 110  mg/dL    BUN 12 6 - 20 mg/dL    Creatinine 3.7 (H) 0.5 - 1.4 mg/dL    Calcium 10.6 (H) 8.7 - 10.5 mg/dL    Anion Gap 13 8 - 16 mmol/L    eGFR 14 (A) >60 mL/min/1.73 m^2   POCT glucose    Collection Time: 04/20/24  8:19 AM   Result Value Ref Range    POCT Glucose 103 70 - 110 mg/dL   ]    I/O last 3 completed shifts:  In: 500 [Other:500]  Out: 1000 [Other:1000]    Vitals:    04/20/24 0001 04/20/24 0523 04/20/24 0822 04/20/24 0845   BP: 138/81 139/83 (!) 142/93    Pulse: 85 87 76 83   Resp: 19 19 18    Temp: 98.6 °F (37 °C) 98.6 °F (37 °C) 97.9 °F (36.6 °C)    TempSrc: Oral Oral Oral    SpO2: 96% 95% 96%    Weight:       Height:           Pos Jvd, No Thyromegaly or Lymphadenopathy  Lungs: Fairly clear anteriorly and laterally  Cor: RRR no G or rubs  Abd: Soft benign good bowel sounds non tender  Ext: No E C C    A)    More alert   ESRD x 8 y Normal HD TTS this week mwf seems to be well dialyzed   AMS with diffuse slow eeg waves  Hypoglycemia ( in esrd patient might be an ominous sign)   Anemia   Hx of cva ? New CVA  Hx of prediabetes  Hypercalcemia ( mild (<12)   Elevated total protein     P)    Renal Diet when able   Home meds when able  Protect access  HD tts   EPO prn   Binders prn   Adjust all meds to the degree of renal fx  Close follow up I/O and weights  Maintain Hydration

## 2024-04-20 NOTE — PLAN OF CARE
04/20/24 1533   Medicare Message   Important Message from Medicare regarding Discharge Appeal Rights Given to patient/caregiver;Explained to patient/caregiver;Signed/date by patient/caregiver   Date IMM was signed 04/20/24   Time IMM was signed 1533     Signed by patient's caregiver.  Copy provided.

## 2024-04-20 NOTE — PLAN OF CARE
[place] - Telemetry  Discharge Final Note    Primary Care Provider: Sowmya Mccain MD  Case Management Final Discharge Note      Discharge Disposition: Home Health/[facility]; [place] [facility]    New DME ordered / company name: n/a    Relevant SDOH / Transition of Care Barriers:  n/a    Primary Caretaker and contact information: spouse    Scheduled followup appointment: PCP  5/1/2024 @ 9:30 a.m.    Referrals placed: n/a    Transportation: Family        Patient and family educated on discharge services and updated on DC plan. Bedside RN notified, patient clear to discharge from Case Management Perspective.     Expected Discharge Date: 4/20/2024    Final Discharge Note (most recent)       Final Note - 04/20/24 1659          Final Note    Assessment Type Final Discharge Note     Anticipated Discharge Disposition Home-Health Care c     What phone number can be called within the next 1-3 days to see how you are doing after discharge? [phone]     Hospital Resources/Appts/Education Provided Appointments scheduled and added to AVS;Post-Acute resouces added to AVS        Post-Acute Status    Coverage Payor: MEDICARE - MEDICARE PART A & B -     Discharge Delays Orders Needed   Patient waiting to have dialysis prior to discharge.  Orders needed for home health.                    Important Message from Medicare  Important Message from Medicare regarding Discharge Appeal Rights: Given to patient/caregiver, Explained to patient/caregiver, Signed/date by patient/caregiver     Date IMM was signed: 04/20/24  Time IMM was signed: 1533    Contact Info       Sowmya Mccain MD   Specialty: Internal Medicine, Pediatrics   Relationship: PCP - General    [facility] [place] [address]   Phone: [phone]       Next Steps: Follow up    Instructions: See After Visit Summary:  You will be seen by one of Dr. Mccain's associates for your hospital followup appointment.    [facility]    Specialty: Home Health Services    [address] [place] [address] [place] [address]   Phone: [phone]       Next Steps: Follow up    Instructions: Home Health:  Resume    [facility]    [address] [facility]  [place] [address]   Phone: [phone]       Next Steps: Follow up    Instructions: Outpatient Dialysis: Resume

## 2024-04-20 NOTE — PLAN OF CARE
HOME HEALTH ORDERS  FACE TO FACE ENCOUNTER    Patient Name: Eva Bloom  YOB: 1965    PCP: Sowmya Mccain MD   PCP Address: [address] [facility] [place] [address]  PCP Phone Number: [phone]  PCP Fax: [phone]    Encounter Date: 4/13/24    Admit to Home Health    Diagnoses:  Active Hospital Problems    Diagnosis  POA    *Sepsis [A41.9]  Yes    Pressure injury of left heel, unstageable [L89.620]  Yes    Somnolence [R40.0]  Yes    Acute metabolic encephalopathy [G93.41]  Yes    PAD (peripheral artery disease) [I73.9]  Yes    Severe protein-calorie malnutrition [E43]  Yes    Body mass index (BMI) less than 19 [Z68.1]  Not Applicable    Hypothyroidism [E03.9]  Yes     Chronic    PAF (paroxysmal atrial fibrillation) [I48.0]  Yes     Chronic    ICD (implantable cardioverter-defibrillator) in place - SubQ [Z95.810]  Yes    Chronic combined systolic and diastolic congestive heart failure [I50.42]  Yes    Pulmonary hypertension [I27.20]  Yes    Renal cell carcinoma of left kidney [C64.2]  Yes    Essential hypertension [I10]  Yes     Chronic      Resolved Hospital Problems   No resolved problems to display.       Follow Up Appointments:  Future Appointments   Date Time Provider Department Center   4/26/2024  2:00 PM Katheryn Sargent NP [facility] FAM MED [place]   4/29/2024 10:45 AM Corby Casey MD [facility] VAS [place] [facility]   5/1/2024  8:30 AM LAB, [facility] LAB [place]   5/1/2024  9:30 AM Tank Matos NP [facility] - B   5/8/2024  8:20 AM Sowmya Mccain MD [facility] MED [place]   6/14/2024  9:20 AM LAB, [facility] LAB [facility]   6/14/2024 10:20 AM [facility] CT2 LIMIT 500 LBS [facility] CT SCAN [facility]   6/17/2024  9:30 AM Ben Rivera MD [facility] HEMONC3 [place]   7/5/2024  9:00 AM ECHO, [facility] EKG [facility]   7/11/2024 10:00 AM Shayne Walker MD [facility] CARDIO [facility]       Allergies:  Review of patient's allergies indicates:   Allergen  Reactions    Carvedilol Other (See Comments)     Nausea/vomiting    Allopurinol      Other reaction(s): abnormal transaminases       Medications: Review discharge medications with patient and family and provide education.    Current Facility-Administered Medications   Medication Dose Route Frequency Provider Last Rate Last Admin    acetaminophen tablet 650 mg  650 mg Oral Q6H PRN José Miguel Cristina MD        apixaban tablet 2.5 mg  2.5 mg Oral BID José Miguel Cristina MD   2.5 mg at 04/20/24 0907    aspirin EC tablet 81 mg  81 mg Oral Daily José Miguel Cristina MD   81 mg at 04/20/24 0907    atorvastatin tablet 40 mg  40 mg Oral Daily José Miguel Cristina MD   40 mg at 04/20/24 0907    dextrose 10% bolus 125 mL 125 mL  12.5 g Intravenous PRN José Miguel Cristina MD        dextrose 10% bolus 250 mL 250 mL  25 g Intravenous PRN José Miguel Cristina MD        dextrose 5 % and 0.45 % NaCl infusion   Intravenous Continuous José Miguel Cristina MD 50 mL/hr at 04/18/24 1310 New Bag at 04/18/24 1310    famotidine tablet 20 mg  20 mg Oral Daily José Miguel Cristina MD   20 mg at 04/20/24 0907    glucagon (human recombinant) injection 1 mg  1 mg Intramuscular PRN José Miguel Cristina MD        glucose chewable tablet 16 g  16 g Oral PRN José Miguel Cristina MD        glucose chewable tablet 24 g  24 g Oral PRN José Miguel Cristina MD        levothyroxine tablet 75 mcg  75 mcg Oral Before breakfast José Miguel Cristina MD   75 mcg at 04/20/24 0530    metoprolol tartrate (LOPRESSOR) tablet 25 mg  25 mg Oral BID José Miguel Cristina MD   25 mg at 04/19/24 2258    sodium chloride 0.9% bolus 250 mL 250 mL  250 mL Intravenous PRN Claude Allan MD         Facility-Administered Medications Ordered in Other Encounters   Medication Dose Route Frequency Provider Last Rate Last Admin    0.9%  NaCl infusion   Intravenous Continuous Soni Bhatti MD   New Bag at 07/21/23 6718      Current Discharge Medication List        CONTINUE these medications which have NOT CHANGED    Details   !! acetaminophen (TYLENOL) 500 MG tablet Take 500 mg by mouth every 6 (six) hours as needed for Pain.      !! acetaminophen (TYLENOL) 500 MG tablet Take 1 tablet (500 mg total) by mouth every 4 (four) hours as needed for Pain or Temperature greater than (100.5 or greater).  Qty: 30 tablet, Refills: 0      albuterol-ipratropium (DUO-NEB) 2.5 mg-0.5 mg/3 mL nebulizer solution Take by nebulization.      apixaban (ELIQUIS) 2.5 mg Tab Take 1 tablet (2.5 mg total) by mouth 2 (two) times daily.  Qty: 60 tablet, Refills: 11    Associated Diagnoses: PAF (paroxysmal atrial fibrillation)      aspirin (ECOTRIN) 81 MG EC tablet Take 1 tablet (81 mg total) by mouth once daily.  Qty: 90 tablet, Refills: 3      atorvastatin (LIPITOR) 40 MG tablet Take 1 tablet (40 mg total) by mouth once daily.  Qty: 90 tablet, Refills: 3      bisacodyL (DULCOLAX) 10 mg Supp Place 10 mg rectally.      cabozantinib (CABOMETYX) 60 mg Tab TAKE ONE TABLET BY MOUTH ONCE DAILY AT THE SAME TIME ON AN EMPTY STOMACH AT LEAST 1 HOUR BEFORE OR 2 HOURS AFTER EATING. AVOID GRAPEFRUIT PRODUCTS  Qty: 30 tablet, Refills: 4    Associated Diagnoses: Metastatic renal cell carcinoma, unspecified laterality      epoetin david-epbx (RETACRIT INJ) Epoetin david - epbx (Retacrit)      hyoscyamine (LEVSIN/SL) 0.125 mg Subl Place under the tongue.      lanthanum (FOSRENOL) 1000 MG chewable tablet Take 1 tablet by mouth.      levothyroxine (SYNTHROID) 75 MCG tablet TAKE 1 TABLET BY MOUTH EVERY DAY  Qty: 90 tablet, Refills: 3    Associated Diagnoses: Hypothyroidism, unspecified type      lidocaine-prilocaine (EMLA) cream APPLY ATLEAST 30 MINUTES BEFORE TREATMENT 3 TIMES A WEEK  Qty: 30 g, Refills: 11    Associated Diagnoses: Hypertensive CKD, ESRD on dialysis      LORAZEPAM INTENSOL 2 mg/mL Conc Take by mouth.      metoprolol succinate (TOPROL-XL) 25 MG 24 hr tablet Take  1 tablet (25 mg total) by mouth once daily.  Qty: 30 tablet, Refills: 11    Comments: .  Associated Diagnoses: Tachycardia; Paroxysmal atrial fibrillation; Chronic combined systolic and diastolic congestive heart failure      mirtazapine (REMERON) 7.5 MG Tab Take 1 tablet (7.5 mg total) by mouth every evening.  Qty: 30 tablet, Refills: 11    Associated Diagnoses: Severe protein-calorie malnutrition; Insomnia, unspecified type      mv,Ca,min-folic acid-vit K1 (ONE-A-DAY WOMEN'S 50 PLUS) 400-20 mcg Tab Take 1 tablet by mouth once daily.      naloxone (NARCAN) 1 mg/mL injection 2 mg (1 mg per nostril) by Nasal route as needed for opioid overdose; may repeat in 3 to 5 minutes if not effective. Call 911  Qty: 2 mL, Refills: 11      nystatin (MYCOSTATIN) cream SMARTSIG:sparingly Topical 2-4 Times Daily PRN      ondansetron (ZOFRAN-ODT) 4 MG TbDL Take by mouth.      oxyCODONE (ROXICODONE) 5 MG immediate release tablet Take 1 tablet (5 mg total) by mouth every 6 (six) hours as needed for Pain.  Qty: 11 tablet, Refills: 0    Comments: Quantity prescribed more than 7 day supply? No      pantoprazole (PROTONIX) 40 MG tablet Take 1 tablet (40 mg total) by mouth 2 (two) times daily.  Qty: 60 tablet, Refills: 11      traMADoL (ULTRAM) 50 mg tablet Take 1 tablet (50 mg total) by mouth every 12 (twelve) hours as needed for Pain.  Qty: 30 each, Refills: 1    Comments: Quantity prescribed more than 7 day supply? Yes, quantity medically necessary  Associated Diagnoses: Metastatic renal cell carcinoma to intra-abdominal site; Metastatic renal cell carcinoma, left       !! - Potential duplicate medications found. Please discuss with provider.        STOP taking these medications       cyclobenzaprine (FLEXERIL) 5 MG tablet Comments:   Reason for Stopping:                 I have seen and examined this patient within the last 30 days. My clinical findings that support the need for the home health skilled services and home bound status are  the following:no   Weakness/numbness causing balance and gait disturbance due to Weakness/Debility and Dehydration making it taxing to leave home.  Requiring assistive device to leave home due to unsteady gait caused by  Weakness/Debility.  Mental confusion making it unsafe for patient to leave home alone due to  Dementia and Acute Delirium.     Diet:   cardiac diet    Labs:  Report Lab results to PCP.    Referrals/ Consults  Physical Therapy to evaluate and treat. Evaluate for home safety and equipment needs; Establish/upgrade home exercise program. Perform / instruct on therapeutic exercises, gait training, transfer training, and Range of Motion.  Occupational Therapy to evaluate and treat. Evaluate home environment for safety and equipment needs. Perform/Instruct on transfers, ADL training, ROM, and therapeutic exercises.   to evaluate for community resources/long-range planning.    Activities:   activity as tolerated    Nursing:   Agency to admit patient within 24 hours of hospital discharge unless specified on physician order or at patient request    SN to complete comprehensive assessment including routine vital signs. Instruct on disease process and s/s of complications to report to MD. Review/verify medication list sent home with the patient at time of discharge  and instruct patient/caregiver as needed. Frequency may be adjusted depending on start of care date.     Skilled nurse to perform up to 3 visits PRN for symptoms related to diagnosis    Notify MD if SBP > 160 or < 90; DBP > 90 or < 50; HR > 120 or < 50; Temp > 101; O2 < 88%; Other:       Ok to schedule additional visits based on staff availability and patient request on consecutive days within the home health episode.    When multiple disciplines ordered:    Start of Care occurs on Sunday - Wednesday schedule remaining discipline evaluations as ordered on separate consecutive days following the start of care.    Thursday SOC -schedule  subsequent evaluations Friday and Monday the following week.     Friday - Saturday SOC - schedule subsequent discipline evaluations on consecutive days starting Monday of the following week.    For all post-discharge communication and subsequent orders please contact patient's primary care physician. If unable to reach primary care physician or do not receive response within 30 minutes, please contact [phone] for clinical staff order clarification    I certify that this patient is confined to her home and needs intermittent skilled nursing care, physical therapy, and occupational therapy.

## 2024-04-20 NOTE — NURSING
[facility]  Nurses Note -- 4 Eyes      4/20/2024       Skin assessed on: Q Shift      [] No Pressure Injuries Present    []Prevention Measures Documented    [x] Yes LDA  for Pressure Injury Previously documented     [] Yes New Pressure Injury Discovered   [] LDA for New Pressure Injury Added      Attending RN:  Enio Snider, RN     Second RN:  Karla Ambriz LPN

## 2024-04-21 VITALS
SYSTOLIC BLOOD PRESSURE: 113 MMHG | HEIGHT: 64 IN | TEMPERATURE: 98 F | RESPIRATION RATE: 20 BRPM | HEART RATE: 85 BPM | BODY MASS INDEX: 18.33 KG/M2 | WEIGHT: 107.38 LBS | OXYGEN SATURATION: 99 % | DIASTOLIC BLOOD PRESSURE: 84 MMHG

## 2024-04-21 LAB
POCT GLUCOSE: 82 MG/DL (ref 70–110)
POCT GLUCOSE: 97 MG/DL (ref 70–110)

## 2024-04-21 PROCEDURE — 25000003 PHARM REV CODE 250: Performed by: HOSPITALIST

## 2024-04-21 RX ADMIN — LEVOTHYROXINE SODIUM 75 MCG: 75 TABLET ORAL at 05:04

## 2024-04-21 NOTE — NURSING
Patient is for discharged but the family asked if they can stay for tonight and they will leave early in the morning. JAZMIN Ruiz informed about it and she spoke to Dr. Joya and agree that they can stay for tonight and can leave first thing in the morning. Patient and Spouse informed.   Will continue to monitor.

## 2024-04-21 NOTE — NURSING
Handoff report received from morning shift RN, patient is asleep, on Room air, no distress noted, bed on lowest position, wheels locked, call light in reach.

## 2024-04-21 NOTE — NURSING
[facility] MEWS TRIGGER FOLLOW UP       MEWS Monitoring, Score is: 3  Indication for review: HR 44    Spoke with bedside RN, Demian, patient seen at bedside. Monitor is reading HR in the 80s, device is reading 44. EKG reads 79. Bedside RN awaiting provider input for metoprolol administration. Please call [phone] with any concerns.

## 2024-04-21 NOTE — NURSING
[facility]  Nurses Note -- 4 Eyes      4/20/2024       Skin assessed on: Q Shift      [] No Pressure Injuries Present    []Prevention Measures Documented    [x] Yes LDA  for Pressure Injury Previously documented     [] Yes New Pressure Injury Discovered   [] LDA for New Pressure Injury Added      Attending RN:  Demian Hay RN     Second RN:  King Enio RN

## 2024-04-21 NOTE — NURSING
Discharge instructions given to patient and spouse at bedside. Patient and spouse verbalized understanding of instructions. They states willingness to comply. IV Saline lock removed. Tele monitoring removed.

## 2024-04-21 NOTE — NURSING
Patient left the unit via wheelchair for discharge home. Patient accompanied by spouse. No apparent distress noted.

## 2024-04-21 NOTE — NURSING
Patient came back to the unit via bed accompanied by a transporter, on room air, no apparent distress noted, vital signs taken. Noted that her HR was 44. Notified JAZMIN Ruiz about it. [facility] Nurse FELIPE Youssef at the bedside.  Will continue to monitor.

## 2024-04-23 PROBLEM — A41.9 SEPSIS: Status: RESOLVED | Noted: 2022-10-29 | Resolved: 2024-04-23

## 2024-04-25 ENCOUNTER — PATIENT MESSAGE (OUTPATIENT)
Dept: ADMINISTRATIVE | Facility: CLINIC | Age: 59
End: 2024-04-25
Payer: MEDICARE

## 2024-04-25 ENCOUNTER — PATIENT OUTREACH (OUTPATIENT)
Dept: ADMINISTRATIVE | Facility: CLINIC | Age: 59
End: 2024-04-25
Payer: MEDICARE

## 2024-04-25 ENCOUNTER — DOCUMENT SCAN (OUTPATIENT)
Dept: HOME HEALTH SERVICES | Facility: HOSPITAL | Age: 59
End: 2024-04-25
Payer: MEDICARE

## 2024-04-25 NOTE — PROGRESS NOTES
C3 nurse attempted to contact Eva Bloom  for a TCC post hospital discharge follow up call. No answer. Left voicemail with callback information. The patient has a scheduled HOSFU appointment with BRENAD Sargent  on 04/26/2024 @ 1400.     Message sent to PCP staff

## 2024-04-26 DIAGNOSIS — A41.9 SEPSIS, DUE TO UNSPECIFIED ORGANISM, UNSPECIFIED WHETHER ACUTE ORGAN DYSFUNCTION PRESENT: Primary | ICD-10-CM

## 2024-04-29 ENCOUNTER — OFFICE VISIT (OUTPATIENT)
Dept: VASCULAR SURGERY | Facility: CLINIC | Age: 59
End: 2024-04-29
Payer: MEDICARE

## 2024-04-29 VITALS
BODY MASS INDEX: 16.73 KG/M2 | WEIGHT: 98 LBS | SYSTOLIC BLOOD PRESSURE: 133 MMHG | HEIGHT: 64 IN | HEART RATE: 82 BPM | DIASTOLIC BLOOD PRESSURE: 83 MMHG

## 2024-04-29 DIAGNOSIS — C78.00 METASTATIC RENAL CELL CARCINOMA TO LUNG, UNSPECIFIED LATERALITY: ICD-10-CM

## 2024-04-29 DIAGNOSIS — C64.9 METASTATIC RENAL CELL CARCINOMA TO LUNG, UNSPECIFIED LATERALITY: ICD-10-CM

## 2024-04-29 DIAGNOSIS — Z99.2 ESRD (END STAGE RENAL DISEASE) ON DIALYSIS: ICD-10-CM

## 2024-04-29 DIAGNOSIS — N18.6 ESRD (END STAGE RENAL DISEASE) ON DIALYSIS: ICD-10-CM

## 2024-04-29 DIAGNOSIS — L97.429 HEEL ULCER, LEFT, WITH UNSPECIFIED SEVERITY: ICD-10-CM

## 2024-04-29 DIAGNOSIS — L97.529 ISCHEMIC TOE ULCER, LEFT, WITH UNSPECIFIED SEVERITY: Primary | ICD-10-CM

## 2024-04-29 PROCEDURE — 99214 OFFICE O/P EST MOD 30 MIN: CPT | Mod: GW,S$PBB,, | Performed by: SURGERY

## 2024-04-29 PROCEDURE — 99999 PR PBB SHADOW E&M-EST. PATIENT-LVL IV: CPT | Mod: PBBFAC,,, | Performed by: SURGERY

## 2024-04-29 PROCEDURE — 99214 OFFICE O/P EST MOD 30 MIN: CPT | Mod: PBBFAC | Performed by: SURGERY

## 2024-05-01 ENCOUNTER — LAB VISIT (OUTPATIENT)
Dept: LAB | Facility: HOSPITAL | Age: 59
End: 2024-05-01
Attending: FAMILY MEDICINE
Payer: MEDICARE

## 2024-05-01 ENCOUNTER — OFFICE VISIT (OUTPATIENT)
Dept: FAMILY MEDICINE | Facility: CLINIC | Age: 59
End: 2024-05-01
Payer: MEDICARE

## 2024-05-01 VITALS
OXYGEN SATURATION: 96 % | SYSTOLIC BLOOD PRESSURE: 114 MMHG | BODY MASS INDEX: 16.82 KG/M2 | RESPIRATION RATE: 18 BRPM | DIASTOLIC BLOOD PRESSURE: 74 MMHG | HEART RATE: 90 BPM | TEMPERATURE: 98 F | HEIGHT: 64 IN

## 2024-05-01 DIAGNOSIS — Z86.73 HISTORY OF CVA (CEREBROVASCULAR ACCIDENT): ICD-10-CM

## 2024-05-01 DIAGNOSIS — G93.41 METABOLIC ENCEPHALOPATHY: ICD-10-CM

## 2024-05-01 DIAGNOSIS — R05.9 COUGH IN ADULT PATIENT: Primary | ICD-10-CM

## 2024-05-01 DIAGNOSIS — Z09 HOSPITAL DISCHARGE FOLLOW-UP: ICD-10-CM

## 2024-05-01 DIAGNOSIS — D69.6 THROMBOCYTOPENIA, UNSPECIFIED: ICD-10-CM

## 2024-05-01 DIAGNOSIS — E43 SEVERE PROTEIN-CALORIE MALNUTRITION: ICD-10-CM

## 2024-05-01 LAB
CHOLEST SERPL-MCNC: 75 MG/DL (ref 120–199)
CHOLEST/HDLC SERPL: 3.1 {RATIO} (ref 2–5)
HDLC SERPL-MCNC: 24 MG/DL (ref 40–75)
HDLC SERPL: 32 % (ref 20–50)
LDLC SERPL CALC-MCNC: 30.2 MG/DL (ref 63–159)
NONHDLC SERPL-MCNC: 51 MG/DL
PREALB SERPL-MCNC: 14 MG/DL (ref 20–43)
TRIGL SERPL-MCNC: 104 MG/DL (ref 30–150)

## 2024-05-01 PROCEDURE — 99999 PR PBB SHADOW E&M-EST. PATIENT-LVL V: CPT | Mod: PBBFAC,,, | Performed by: NURSE PRACTITIONER

## 2024-05-01 PROCEDURE — 36415 COLL VENOUS BLD VENIPUNCTURE: CPT | Mod: PO | Performed by: FAMILY MEDICINE

## 2024-05-01 PROCEDURE — 99214 OFFICE O/P EST MOD 30 MIN: CPT | Mod: S$PBB,GW,, | Performed by: NURSE PRACTITIONER

## 2024-05-01 PROCEDURE — 80061 LIPID PANEL: CPT | Performed by: FAMILY MEDICINE

## 2024-05-01 PROCEDURE — 99215 OFFICE O/P EST HI 40 MIN: CPT | Mod: PBBFAC,PN | Performed by: NURSE PRACTITIONER

## 2024-05-01 PROCEDURE — 84134 ASSAY OF PREALBUMIN: CPT | Performed by: FAMILY MEDICINE

## 2024-05-01 RX ORDER — DOXYCYCLINE HYCLATE 100 MG
100 TABLET ORAL 2 TIMES DAILY
Qty: 14 TABLET | Refills: 0 | Status: SHIPPED | OUTPATIENT
Start: 2024-05-01

## 2024-05-01 NOTE — PROGRESS NOTES
Routine Office Visit    Patient Name: Eva Bloom    : 1965  MRN: [id number]    Chief Complaint:  Hospital follow-up    Subjective:  Eva is a 58 y.o. female who presents today for a hospital follow-up.  Discharge summary is as follows in bold:    [facility] Medicine  Discharge Summary        Patient Name: Eva Bloom  MRN: [id number]  TRENT: [id number]  Patient Class: IP- Inpatient  Admission Date: 2024  Hospital Length of Stay: 7 days  Discharge Date and Time: 24     Attending Physician: Song Gao MD   Discharging Provider: Song Gao MD  Primary Care Provider: Sowmya Mccain MD     Primary Care Team: Networked reference to record PCT      HPI:   This is a 58 year old female, with a Anemia, Atrial fibrillation, CHF NYHA class II, chronic, systolic, CVA, ESRD on dialysis (Tues, Thurs, Sat) with completion of session today, Essential HTN, Hx of kidney cancer, Hyperparathyroidism, Myocardiopathy, and Prediabetes, who presents to the ED complaining of Palpitations, symptoms onset PTA. Patient reports cough, sleep disturbance, and generalized pruritus. Patient states she was at dialysis PTA and receiving dialysis when her heart rate jumped into the 120's and the dialysis nurses instructed her to present to the ER. Patient also states she has been on dialysis for 8 years and that she does not make urine. Patient further states she is on 2.5 mg of Eliquis. Additional history obtained from independent historian: patient's  states that in July last year she had a spot on her left foot and had a line inserted. Patient denies Hx of irregular heart rate or MI. Patient also denies rhinorrhea, sore throat, abdominal pain, emesis, diarrhea, or other associated symptoms. No other alleviating or exacerbating factors. This is the extent of the patient's complaints in the ED.patient is febrile in .8,tachycardic 120.sepsis likely  duo to l;ine  infection,patient has been started on broad  spectrum IV Abx,blood culture is pending.     * No surgery found *       Hospital Course:   This is a 58 year old female, with a Anemia, Atrial fibrillation, CHF NYHA class II, chronic, systolic, CVA, ESRD on dialysis (Tues, Thurs, Sat) with completion of session today, Essential HTN, Hx of kidney cancer, Hyperparathyroidism, Myocardiopathy, and Prediabetes, who presents to the ED complaining of Palpitations, symptoms onset PTA. Patient reports cough, sleep disturbance, and generalized pruritus. Patient states she was at dialysis PTA and receiving dialysis when her heart rate jumped into the 120's and the dialysis nurses instructed her to present to the ER. .patient is febrile in .8,tachycardic 120.sepsis likely  duo to l;ine infection,patient has been started on broad  spectrum IV Abx,blood culture is pending.HR and BP is improved at this time.cultures sofar negative.  Patient is lethargic today,code stroke was called,transfered to ICU,head CT show no acute process,evaluated by Tele neuro,will have CTA head,check ammonia and TSH,hypoglycemia is resolved,will monitor closely.imagines and labs show no acute process,acute metabolic encephalopathy  seems improving,consulted neurology,ordered EEG.show no seizure activity,but metabolic  encephalopathy,MRI can not be done duo to AICD.patient seems is deliriums,per \d,some improvement.  Discontinued Abx,no sign of infection is seen.   On discharge day, patient's family states that patient's confusion is much improved and they desire to go home.  Patient is for discharged on 4/20/24 but the family asked if they can stay for tonight and they will leave early in the morning. JAZMIN Ruiz informed about it and she spoke to Dr. Joya and agree that they can stay for tonight and can leave first thing in the morning. Patient and Spouse informed.      Goals of Care Treatment Preferences:  Code Status: Full Code     Health  care agent: Otis Bloom ()  Health care agent number: [phone] cell     What is most important right now is to focus on spending time at home, avoiding the hospital, remaining as independent as possible, quality of life, even if it means sacrificing a little time, improvement in condition but with limits to invasive therapies, comfort and QOL .  Accordingly, we have decided that the best plan to meet the patient's goals includes continuing with treatment, enrolling in hospice care.    Reports today with her  for hospital follow-up.  He does help her with her history.  Patient reports doing much better since discharge.  She denies any confusion or mental status changes.  She does note that she has been having a productive cough at least for the last 3 weeks which started prior to going to the hospital.  Chest x-ray during hospitalization did not show any evidence of pneumonia.  The cough is not associated with any shortness of breath or wheezing.  Patient does report that her appetite is improving since discharge.  She reports taking Coricidin for the cough with some improvement.  She denies any recurring fevers or chills.    Past Medical History  Past Medical History:   Diagnosis Date    Anemia     Anticoagulant long-term use     Atrial fibrillation     Bronchitis 03/01/2017    Cancer 2016    kidney cancer    CHF (congestive heart failure), NYHA class II, chronic, systolic     CMV (cytomegalovirus) antibody positive     CVA (cerebral vascular accident) 1/3/2024    Encounter for blood transfusion     ESRD (end stage renal disease)     Essential hypertension 09/23/2015    H/O herpes simplex type 2 infection     Herpes simplex type 1 antibody positive     History of kidney cancer     s/p left nephrectomy 1/2016    Hyperparathyroidism, unspecified     Hyperuricemia without signs of inflammatory arthritis and tophaceous disease     Kidney stones     LGSIL (low grade squamous intraepithelial dysplasia)      Myocardiopathy 07/21/2017    Prediabetes     Proteinuria     Renal disorder     Thyroid nodule     Urate nephropathy        Family History  Family History   Problem Relation Name Age of Onset    Hypertension Mother      Diabetes Father      Kidney disease Father      No Known Problems Sister Sophy     Heart disease Sister Maria A     No Known Problems Sister Claudine     No Known Problems Brother Dax     No Known Problems Brother Don     Cataracts Maternal Aunt      No Known Problems Maternal Uncle      No Known Problems Paternal Aunt      No Known Problems Paternal Uncle      No Known Problems Maternal Grandmother      Diabetes Maternal Grandfather      No Known Problems Paternal Grandmother      No Known Problems Paternal Grandfather      No Known Problems Son      No Known Problems Son      No Known Problems Other      Breast cancer Neg Hx      Colon cancer Neg Hx      Cancer Neg Hx      Stroke Neg Hx      Amblyopia Neg Hx      Blindness Neg Hx      Glaucoma Neg Hx      Macular degeneration Neg Hx      Retinal detachment Neg Hx      Strabismus Neg Hx      Thyroid disease Neg Hx         Current Medications  Current Outpatient Medications on File Prior to Visit   Medication Sig Dispense Refill    acetaminophen (TYLENOL) 500 MG tablet Take 500 mg by mouth every 6 (six) hours as needed for Pain.      acetaminophen (TYLENOL) 500 MG tablet Take 1 tablet (500 mg total) by mouth every 4 (four) hours as needed for Pain or Temperature greater than (100.5 or greater). 30 tablet 0    albuterol-ipratropium (DUO-NEB) 2.5 mg-0.5 mg/3 mL nebulizer solution Take by nebulization.      apixaban (ELIQUIS) 2.5 mg Tab Take 1 tablet (2.5 mg total) by mouth 2 (two) times daily. 60 tablet 11    aspirin (ECOTRIN) 81 MG EC tablet Take 1 tablet (81 mg total) by mouth once daily. 90 tablet 3    atorvastatin (LIPITOR) 40 MG tablet Take 1 tablet (40 mg total) by mouth once daily. 90 tablet 3    bisacodyL (DULCOLAX) 10 mg Supp Place 10 mg  rectally.      cabozantinib (CABOMETYX) 60 mg Tab TAKE ONE TABLET BY MOUTH ONCE DAILY AT THE SAME TIME ON AN EMPTY STOMACH AT LEAST 1 HOUR BEFORE OR 2 HOURS AFTER EATING. AVOID GRAPEFRUIT PRODUCTS 30 tablet 4    epoetin david-epbx (RETACRIT INJ) Epoetin david - epbx (Retacrit)      hyoscyamine (LEVSIN/SL) 0.125 mg Subl Place under the tongue.      lanthanum (FOSRENOL) 1000 MG chewable tablet Take 1 tablet by mouth.      levothyroxine (SYNTHROID) 75 MCG tablet TAKE 1 TABLET BY MOUTH EVERY DAY 90 tablet 3    lidocaine-prilocaine (EMLA) cream APPLY ATLEAST 30 MINUTES BEFORE TREATMENT 3 TIMES A WEEK 30 g 11    LORAZEPAM INTENSOL 2 mg/mL Conc Take by mouth.      metoprolol succinate (TOPROL-XL) 25 MG 24 hr tablet Take 1 tablet (25 mg total) by mouth once daily. 30 tablet 11    mirtazapine (REMERON) 7.5 MG Tab Take 1 tablet (7.5 mg total) by mouth every evening. 30 tablet 11    mv,Ca,min-folic acid-vit K1 (ONE-A-DAY WOMEN'S 50 PLUS) 400-20 mcg Tab Take 1 tablet by mouth once daily.      naloxone (NARCAN) 1 mg/mL injection 2 mg (1 mg per nostril) by Nasal route as needed for opioid overdose; may repeat in 3 to 5 minutes if not effective. Call 911 2 mL 11    nystatin (MYCOSTATIN) cream SMARTSIG:sparingly Topical 2-4 Times Daily PRN      ondansetron (ZOFRAN-ODT) 4 MG TbDL Take by mouth.      oxyCODONE (ROXICODONE) 5 MG immediate release tablet Take 1 tablet (5 mg total) by mouth every 6 (six) hours as needed for Pain. 11 tablet 0    pantoprazole (PROTONIX) 40 MG tablet Take 1 tablet (40 mg total) by mouth 2 (two) times daily. 60 tablet 11    traMADoL (ULTRAM) 50 mg tablet Take 1 tablet (50 mg total) by mouth every 12 (twelve) hours as needed for Pain. 30 each 1    [DISCONTINUED] amLODIPine (NORVASC) 5 MG tablet TAKE 1 TABLET BY MOUTH EVERY DAY 90 tablet 3     Current Facility-Administered Medications on File Prior to Visit   Medication Dose Route Frequency Provider Last Rate Last Admin    0.9%  NaCl infusion   Intravenous  "Continuous Soni Bhatti MD   New Bag at 07/21/23 1116       Allergies   Review of patient's allergies indicates:   Allergen Reactions    Carvedilol Other (See Comments)     Nausea/vomiting    Allopurinol      Other reaction(s): abnormal transaminases       Review of Systems (Pertinent positives)  Review of Systems   Constitutional:  Negative for chills and fever.   HENT: Negative.     Eyes: Negative.    Respiratory:  Positive for cough and sputum production. Negative for hemoptysis, shortness of breath and wheezing.    Cardiovascular: Negative.    Gastrointestinal: Negative.    Genitourinary: Negative.    Skin: Negative.        /74 (BP Location: Right arm, Patient Position: Sitting, BP Method: Medium (Manual))   Pulse 90   Temp 98 °F (36.7 °C) (Oral)   Resp 18   Ht 5' 4" (1.626 m)   LMP 10/24/2016   SpO2 96%   BMI 16.82 kg/m²     Physical Exam  Vitals reviewed.   Constitutional:       General: She is not in acute distress.     Appearance: Normal appearance. She is not ill-appearing, toxic-appearing or diaphoretic.   HENT:      Head: Normocephalic and atraumatic.   Cardiovascular:      Rate and Rhythm: Normal rate and regular rhythm.      Pulses: Normal pulses.      Heart sounds: Normal heart sounds.   Pulmonary:      Effort: Pulmonary effort is normal. No respiratory distress.      Breath sounds: No wheezing.      Comments: Mild bibasilar inspiratory crackles noted; some improvement with deep breathing  Abdominal:      General: Bowel sounds are normal. There is no distension.      Palpations: Abdomen is soft.      Tenderness: There is no abdominal tenderness.   Musculoskeletal:         General: No swelling, tenderness or deformity. Normal range of motion.   Skin:     General: Skin is warm and dry.      Capillary Refill: Capillary refill takes less than 2 seconds.   Neurological:      General: No focal deficit present.      Mental Status: She is alert and oriented to person, place, and time. "   Psychiatric:         Mood and Affect: Mood normal.         Behavior: Behavior normal.          Assessment/Plan:  Eva Bloom is a 58 y.o. female who presents today for :    Eva was seen today for hospital follow up and cough.    Diagnoses and all orders for this visit:    Cough in adult patient  -     doxycycline (VIBRA-TABS) 100 MG tablet; Take 1 tablet (100 mg total) by mouth 2 (two) times daily.    Reviewed chest x-ray results with patient and .  Patient was negative for flu and COVID upon arrival to hospital.  No significant evidence of fluid overload on examination.  Treat with doxycycline to cover atypicals.    Metabolic encephalopathy    Much improved since hospitalization.  Continue monitoring for any mental status changes.    Hospital discharge follow-up    As above.    We discussed potential hospice referral or palliative care referral (as mentioned in discharge summary).  She is not interested in hospice but is potentially interested in palliative care referral in the future.  She is otherwise getting home health nursing and therapy and will continue this.  She ultimately will defer palliative care referral for now.    Thrombocytopenia, unspecified    Decreased at 136 prior to discharge.  No evidence of bleeding.  Continue to monitor.        This office note has been dictated.  This dictation has been generated using M-Modal Fluency Direct dictation; some phonetic errors may occur.

## 2024-05-02 ENCOUNTER — PATIENT MESSAGE (OUTPATIENT)
Dept: FAMILY MEDICINE | Facility: CLINIC | Age: 59
End: 2024-05-02
Payer: MEDICARE

## 2024-05-08 ENCOUNTER — TELEPHONE (OUTPATIENT)
Dept: FAMILY MEDICINE | Facility: CLINIC | Age: 59
End: 2024-05-08
Payer: MEDICARE

## 2024-05-08 NOTE — TELEPHONE ENCOUNTER
----- Message from Haroon Mccormack sent at 5/8/2024  8:51 AM CDT -----  Regarding: Self .[phone]  Type: Patient Call Back     What is the request in detail: Please call pt back with info on if she can be squeezed in for an appt due to her missing hers today, Next appt isnt until 06/05    Can the clinic reply by [facility]? No     Would the patient rather a call back or a response via [facility]? Call back    Best call back number: .[phone]      Additional Information:    Thank you.

## 2024-05-08 NOTE — TELEPHONE ENCOUNTER
Attempted to contact patient. Left a voice mail to call clinic back. Patient has appointment with provider for today. Please reschedule with NP if patient does not show up

## 2024-05-08 NOTE — TELEPHONE ENCOUNTER
"Spoke with , stating that he spoke to someone on yesterday evening trying to help him with in- home visits from a NP.  admits to oversleeping on today, & did not show up for appointment on today.  does not know the name of the person he spoke to on yesterday, nor is there any record on the chart. Patient refused a visit on today with NP, states "he will just wait until the person reaches out to him on this afternoon".  is probably speaking about [facility], but not sure? Patient instructed to call the clinic on tomorrow if he needs assistance.   "

## 2024-05-09 ENCOUNTER — TELEPHONE (OUTPATIENT)
Dept: HOME HEALTH SERVICES | Facility: CLINIC | Age: 59
End: 2024-05-09
Payer: MEDICARE

## 2024-05-09 DIAGNOSIS — G93.41 ACUTE METABOLIC ENCEPHALOPATHY: Primary | ICD-10-CM

## 2024-05-09 DIAGNOSIS — I63.9 CEREBROVASCULAR ACCIDENT (CVA), UNSPECIFIED MECHANISM: ICD-10-CM

## 2024-05-13 NOTE — TELEPHONE ENCOUNTER
In an effort to decrease risk of patient's exposure to COVID-19 nurse notified patient that appts with vascular lab and with Dr. Muoñz will be rescheduled to later date. Pt verbalized understanding. Appointments rescheduled, pt verified. Appointment letter placed in mail.        Patient informed normal labs

## 2024-05-15 ENCOUNTER — TELEPHONE (OUTPATIENT)
Dept: FAMILY MEDICINE | Facility: CLINIC | Age: 59
End: 2024-05-15
Payer: MEDICARE

## 2024-05-15 ENCOUNTER — OFFICE VISIT (OUTPATIENT)
Dept: FAMILY MEDICINE | Facility: CLINIC | Age: 59
End: 2024-05-15
Payer: MEDICARE

## 2024-05-15 VITALS
BODY MASS INDEX: 17.51 KG/M2 | HEART RATE: 79 BPM | OXYGEN SATURATION: 99 % | DIASTOLIC BLOOD PRESSURE: 68 MMHG | SYSTOLIC BLOOD PRESSURE: 130 MMHG | TEMPERATURE: 98 F | WEIGHT: 102 LBS

## 2024-05-15 DIAGNOSIS — E03.9 HYPOTHYROIDISM, UNSPECIFIED TYPE: ICD-10-CM

## 2024-05-15 DIAGNOSIS — G47.00 INSOMNIA, UNSPECIFIED TYPE: ICD-10-CM

## 2024-05-15 DIAGNOSIS — I48.0 PAF (PAROXYSMAL ATRIAL FIBRILLATION): ICD-10-CM

## 2024-05-15 DIAGNOSIS — Z86.73 HISTORY OF CVA (CEREBROVASCULAR ACCIDENT): ICD-10-CM

## 2024-05-15 DIAGNOSIS — Z76.0 MEDICATION REFILL: Primary | ICD-10-CM

## 2024-05-15 DIAGNOSIS — E43 SEVERE PROTEIN-CALORIE MALNUTRITION: ICD-10-CM

## 2024-05-15 PROCEDURE — 99214 OFFICE O/P EST MOD 30 MIN: CPT | Mod: S$PBB,GW,,

## 2024-05-15 PROCEDURE — 99999 PR PBB SHADOW E&M-EST. PATIENT-LVL IV: CPT | Mod: PBBFAC,,,

## 2024-05-15 PROCEDURE — 99214 OFFICE O/P EST MOD 30 MIN: CPT | Mod: PBBFAC,PO

## 2024-05-15 RX ORDER — MIRTAZAPINE 7.5 MG/1
7.5 TABLET, FILM COATED ORAL NIGHTLY
Qty: 90 TABLET | Refills: 2 | Status: SHIPPED | OUTPATIENT
Start: 2024-05-15

## 2024-05-15 RX ORDER — ASPIRIN 81 MG/1
81 TABLET ORAL DAILY
Qty: 90 TABLET | Refills: 2 | Status: SHIPPED | OUTPATIENT
Start: 2024-05-15

## 2024-05-15 RX ORDER — LEVOTHYROXINE SODIUM 75 UG/1
75 TABLET ORAL DAILY
Qty: 90 TABLET | Refills: 2 | Status: SHIPPED | OUTPATIENT
Start: 2024-05-15

## 2024-05-15 NOTE — TELEPHONE ENCOUNTER
----- Message from Dea Esteban sent at 5/15/2024  3:11 PM CDT -----  Type: Patient Call Back    Who called: Otis    What is the request in detail:Refill on   metoprolol succinate (TOPROL-XL) 25 MG 24 hr tablet      Can the clinic reply by [facility]?No    Would the patient rather a call back or a response via [facility]? Call    Best call back number:[phone]    Additional Information:

## 2024-05-15 NOTE — PROGRESS NOTES
HPI     Chief Complaint:  Chief Complaint   Patient presents with    Medication Refill       Eva Bloom is a 58 y.o. female with multiple medical diagnoses as listed in the medical history and problem list that presents for medication refill.  Pt is not known to me with her last appointment 2/16/2024.      HPI  Pt presents for medication refills. Accompanied by spouse.  Requesting order for care at home for vital signs and dressing changes for wound. Home health previously coming twice weekly. Followed by vascular with [facility] and wound care with [facility].    Assessment & Plan     Problem List Items Addressed This Visit          Cardiac/Vascular    PAF (paroxysmal atrial fibrillation) (Chronic)  Stable with no acute symptoms.  We will refill Eliquis.    Relevant Medications    apixaban (ELIQUIS) 2.5 mg Tab       Endocrine    Hypothyroidism (Chronic)  Stable.  Managed with levothyroxine.  We will refill levothyroxine.    Relevant Medications    levothyroxine (SYNTHROID) 75 MCG tablet    Severe protein-calorie malnutrition    Relevant Medications    mirtazapine (REMERON) 7.5 MG Tab     Other Visit Diagnoses       Medication refill    -  Primary  Requesting refill of Eliquis levothyroxine mirtazapine and aspirin.  We will refill requested medications.    Insomnia, unspecified type      Insomnia managed with Remeron.  We will refill Remeron.    Relevant Medications    mirtazapine (REMERON) 7.5 MG Tab    History of CVA (cerebrovascular accident)      Stable.  Ambulates with wheelchair due to muscular weakness.  We will refill aspirin.  Requesting order for care at home for vital signs and dressing changes for wound. Home health previously coming twice weekly.  Referral for care at home have been ordered.    Relevant Medications    aspirin (ECOTRIN) 81 MG EC tablet              --------------------------------------------      Health Maintenance:  Health Maintenance         Date Due Completion Date    COVID-19  Vaccine (7 - 2023-24 season) 09/01/2023 11/11/2021    Hemoglobin A1c 08/25/2024 8/25/2023    Mammogram 12/08/2024 12/8/2023    Lipid Panel 05/01/2025 5/1/2024    TETANUS VACCINE 09/23/2025 9/23/2015    Colorectal Cancer Screening 03/12/2028 3/12/2021    Pneumococcal Vaccines (Age 0-64) (4 of 4 - PPSV23 or PCV20) 10/01/2029 11/9/2016            Health maintenance reviewed    Follow Up:  No follow-ups on file.    Exam     Review of Systems:  (as noted above)  Review of Systems    Physical Exam:   Physical Exam  Constitutional:       Appearance: She is ill-appearing.   Cardiovascular:      Rate and Rhythm: Normal rate.      Heart sounds: Murmur heard.   Pulmonary:      Effort: Pulmonary effort is normal.   Neurological:      Mental Status: She is lethargic.       Vitals:    05/15/24 0856   BP: 130/68   BP Location: Right arm   Patient Position: Sitting   BP Method: Medium (Manual)   Pulse: 79   Temp: 97.6 °F (36.4 °C)   TempSrc: Oral   SpO2: 99%   Weight: 46.3 kg (102 lb)      Body mass index is 17.51 kg/m².        History     Past Medical History:  Past Medical History:   Diagnosis Date    Anemia     Anticoagulant long-term use     Atrial fibrillation     Bronchitis 03/01/2017    Cancer 2016    kidney cancer    CHF (congestive heart failure), NYHA class II, chronic, systolic     CMV (cytomegalovirus) antibody positive     CVA (cerebral vascular accident) 1/3/2024    Encounter for blood transfusion     ESRD (end stage renal disease)     Essential hypertension 09/23/2015    H/O herpes simplex type 2 infection     Herpes simplex type 1 antibody positive     History of kidney cancer     s/p left nephrectomy 1/2016    Hyperparathyroidism, unspecified     Hyperuricemia without signs of inflammatory arthritis and tophaceous disease     Kidney stones     LGSIL (low grade squamous intraepithelial dysplasia)     Myocardiopathy 07/21/2017    Prediabetes     Proteinuria     Renal disorder     Thyroid nodule     Urate nephropathy         Past Surgical History:  Past Surgical History:   Procedure Laterality Date    BREAST CYST EXCISION      COLONOSCOPY N/A 11/12/2015    COLONOSCOPY N/A 03/12/2021    Procedure: COLONOSCOPY;  Surgeon: Brendon Lanier MD;  Location: [facility] ENDO;  Service: Endoscopy;  Laterality: N/A;  covid test 3/9, labs prior, prep instr mailed -ml    EXCISION OF ARTERIOVENOUS FISTULA Left 09/27/2023    Procedure: EXCISION, AV FISTULA;  Surgeon: FARIDEH Muñoz III, MD;  [facility];  Service: Vascular;  Laterality: Left;  LUE AV graft excision    INSERTION OF BIVENTRICULAR IMPLANTABLE CARDIOVERTER-DEFIBRILLATOR (ICD)  04/2021    INSERTION OF TUNNELED CENTRAL VENOUS CATHETER (CVC) WITH SUBCUTANEOUS PORT N/A 01/25/2023    Procedure: INSERTION, DUAL LUMEN CATHETER WITH PORT, WITH IMAGING GUIDANCE;  Surgeon: FARIDEH Muñoz III, MD;  [facility];  Service: Peripheral Vascular;  Laterality: N/A;  possinble permcath placment    NEPHRECTOMY-LAPAROSCOPIC Left 01/12/2016    PERCUTANEOUS TRANSLUMINAL ANGIOPLASTY OF ARTERIOVENOUS FISTULA Left 04/19/2023    Procedure: PTA, AV FISTULA;  Surgeon: FARIDEH Muñoz III, MD;  Location: [facility] CATH LAB;  Service: Peripheral Vascular;  Laterality: Left;    PERITONEAL CATHETER INSERTION      Permacath insertion  01/12/2017    PLACEMENT OF ARTERIOVENOUS GRAFT  12/28/2022    Procedure: INSERTION, GRAFT, ARTERIOVENOUS;  Surgeon: FARIDEH Muñoz III, MD;  Location: [facility] OR [facility] FLR;  Service: Peripheral Vascular;;    REMOVAL, GRAFT, ARTERIOVENOUS, UPPER EXTREMITY Left 01/25/2023    Procedure: REMOVAL, GRAFT, ARTERIOVENOUS, UPPER EXTREMITY;  Surgeon: FARIDEH Muñoz III, MD;  Location: [facility] OR [facility] FLR;  Service: Peripheral Vascular;  Laterality: Left;    REVISION OF ARTERIOVENOUS FISTULA Left 05/01/2023    Procedure: REVISION, AV FISTULA;  Surgeon: FARIDEH Muñoz III, MD;  Location: [facility] OR [facility] FLR;  Service: Peripheral Vascular;  Laterality: Left;  LUE AVG revision     REVISION OF PROCEDURE INVOLVING ARTERIOVENOUS GRAFT Left 12/28/2022    Procedure: REVISION, PROCEDURE INVOLVING ARTERIOVENOUS GRAFT;  Surgeon: FARIDEH Muñoz III, MD;  Location: [facility] OR 2ND FLR;  Service: Peripheral Vascular;  Laterality: Left;    REVISION OF PROCEDURE INVOLVING ARTERIOVENOUS GRAFT Left 07/21/2023    Procedure: LEFT ARM ARTERIOVENOUS GRAFT EXCISION  AND LIGATION;  Surgeon: Obi Werner MD;  Location: [facility] OR;  Service: Vascular;  Laterality: Left;  Left AVG excision and revision with interposition    SALPINGOOPHORECTOMY Right 2016    KJB---DAVINCI    TONSILLECTOMY      TUBAL LIGATION         Social History:  Social History     Socioeconomic History    Marital status:     Number of children: 2   Occupational History    Occupation: [employer]     Employer: [id number]   Tobacco Use    Smoking status: Never     Passive exposure: Never    Smokeless tobacco: Never   Substance and Sexual Activity    Alcohol use: No    Drug use: Yes     Types: Hydrocodone    Sexual activity: Not Currently     Social Determinants of Health     Financial Resource Strain: Low Risk  (9/28/2023)    Overall Financial Resource Strain (CARDIA)     Difficulty of Paying Living Expenses: Not hard at all   Food Insecurity: No Food Insecurity (9/28/2023)    Hunger Vital Sign     Worried About Running Out of Food in the Last Year: Never true     Ran Out of Food in the Last Year: Never true   Transportation Needs: No Transportation Needs (9/28/2023)    PRAPARE - Transportation     Lack of Transportation (Medical): No     Lack of Transportation (Non-Medical): No   Physical Activity: Inactive (9/28/2023)    Exercise Vital Sign     Days of Exercise per Week: 0 days     Minutes of Exercise per Session: 0 min   Stress: No Stress Concern Present (9/28/2023)    [employer] [place] [employer] - Occupational Stress Questionnaire     Feeling of Stress : Not at all   Housing Stability: Low Risk  (9/28/2023)     Housing Stability Vital Sign     Unable to Pay for Housing in the Last Year: No     Number of Places Lived in the Last Year: 1     Unstable Housing in the Last Year: No       Family History:  Family History   Problem Relation Name Age of Onset    Hypertension Mother      Diabetes Father      Kidney disease Father      No Known Problems Sister Sophy     Heart disease Sister Maria A     No Known Problems Sister Claudine     No Known Problems Brother Dax     No Known Problems Brother Don     Cataracts Maternal Aunt      No Known Problems Maternal Uncle      No Known Problems Paternal Aunt      No Known Problems Paternal Uncle      No Known Problems Maternal Grandmother      Diabetes Maternal Grandfather      No Known Problems Paternal Grandmother      No Known Problems Paternal Grandfather      No Known Problems Son      No Known Problems Son      No Known Problems Other      Breast cancer Neg Hx      Colon cancer Neg Hx      Cancer Neg Hx      Stroke Neg Hx      Amblyopia Neg Hx      Blindness Neg Hx      Glaucoma Neg Hx      Macular degeneration Neg Hx      Retinal detachment Neg Hx      Strabismus Neg Hx      Thyroid disease Neg Hx         Allergies and Medications: (updated and reviewed)  Review of patient's allergies indicates:   Allergen Reactions    Carvedilol Other (See Comments)     Nausea/vomiting    Allopurinol      Other reaction(s): abnormal transaminases     Current Outpatient Medications   Medication Sig Dispense Refill    acetaminophen (TYLENOL) 500 MG tablet Take 500 mg by mouth every 6 (six) hours as needed for Pain.      acetaminophen (TYLENOL) 500 MG tablet Take 1 tablet (500 mg total) by mouth every 4 (four) hours as needed for Pain or Temperature greater than (100.5 or greater). 30 tablet 0    albuterol-ipratropium (DUO-NEB) 2.5 mg-0.5 mg/3 mL nebulizer solution Take by nebulization.      atorvastatin (LIPITOR) 40 MG tablet Take 1 tablet (40 mg total) by mouth once daily. 90 tablet 3    bisacodyL  (DULCOLAX) 10 mg Supp Place 10 mg rectally.      cabozantinib (CABOMETYX) 60 mg Tab TAKE ONE TABLET BY MOUTH ONCE DAILY AT THE SAME TIME ON AN EMPTY STOMACH AT LEAST 1 HOUR BEFORE OR 2 HOURS AFTER EATING. AVOID GRAPEFRUIT PRODUCTS 30 tablet 4    doxycycline (VIBRA-TABS) 100 MG tablet Take 1 tablet (100 mg total) by mouth 2 (two) times daily. 14 tablet 0    epoetin david-epbx (RETACRIT INJ) Epoetin david - epbx (Retacrit)      hyoscyamine (LEVSIN/SL) 0.125 mg Subl Place under the tongue.      lanthanum (FOSRENOL) 1000 MG chewable tablet Take 1 tablet by mouth.      lidocaine-prilocaine (EMLA) cream APPLY ATLEAST 30 MINUTES BEFORE TREATMENT 3 TIMES A WEEK 30 g 11    LORAZEPAM INTENSOL 2 mg/mL Conc Take by mouth.      metoprolol succinate (TOPROL-XL) 25 MG 24 hr tablet Take 1 tablet (25 mg total) by mouth once daily. 30 tablet 11    mv,Ca,min-folic acid-vit K1 (ONE-A-DAY WOMEN'S 50 PLUS) 400-20 mcg Tab Take 1 tablet by mouth once daily.      naloxone (NARCAN) 1 mg/mL injection 2 mg (1 mg per nostril) by Nasal route as needed for opioid overdose; may repeat in 3 to 5 minutes if not effective. Call 911 2 mL 11    nystatin (MYCOSTATIN) cream SMARTSIG:sparingly Topical 2-4 Times Daily PRN      ondansetron (ZOFRAN-ODT) 4 MG TbDL Take by mouth.      oxyCODONE (ROXICODONE) 5 MG immediate release tablet Take 1 tablet (5 mg total) by mouth every 6 (six) hours as needed for Pain. 11 tablet 0    pantoprazole (PROTONIX) 40 MG tablet Take 1 tablet (40 mg total) by mouth 2 (two) times daily. 60 tablet 11    traMADoL (ULTRAM) 50 mg tablet Take 1 tablet (50 mg total) by mouth every 12 (twelve) hours as needed for Pain. 30 each 1    apixaban (ELIQUIS) 2.5 mg Tab Take 1 tablet (2.5 mg total) by mouth 2 (two) times daily. 60 tablet 5    aspirin (ECOTRIN) 81 MG EC tablet Take 1 tablet (81 mg total) by mouth once daily. 90 tablet 2    levothyroxine (SYNTHROID) 75 MCG tablet Take 1 tablet (75 mcg total) by mouth once daily. 90 tablet 2     mirtazapine (REMERON) 7.5 MG Tab Take 1 tablet (7.5 mg total) by mouth every evening. 90 tablet 2     No current facility-administered medications for this visit.     Facility-Administered Medications Ordered in Other Visits   Medication Dose Route Frequency Provider Last Rate Last Admin    0.9%  NaCl infusion   Intravenous Continuous Soni Bhatti MD   New Bag at 07/21/23 1116       Patient Care Team:  Sowmya Mccain MD as PCP - General (Internal Medicine)  Ben Rivera MD as Consulting Physician (Hematology and Oncology)  Leora Louis MD (Cardiology)  Lulu Le LPN as Licensed Practical Nurse  Alexandra as Post Acute Care Provider (Dialysis Center)  Claude Allan MD as Consulting Physician (Nephrology)  [facility] [place] [facility]         - The patient is given an After Visit Summary that lists all medications with directions, allergies, education, orders placed during this encounter and follow-up instructions.      - I have reviewed the patient's medical information including past medical, family, and social history sections including the medications and allergies.      - We discussed the patient's current medications.     This note was created by combination of typed  and MModal dictation.  Transcription errors may be present.  If there are any questions, please contact me.       Katheryn Sargent NP

## 2024-05-15 NOTE — TELEPHONE ENCOUNTER
Left message on answering machine to return call to clinic. According to patient profile this medication was refilled on 1/11/204 with 11 refills. Left message for patient to reach out to her pharmacy to check refill status. She can return call if she has any questions.

## 2024-05-22 ENCOUNTER — CARE AT HOME (OUTPATIENT)
Dept: HOME HEALTH SERVICES | Facility: CLINIC | Age: 59
End: 2024-05-22
Payer: MEDICARE

## 2024-05-22 DIAGNOSIS — I63.9 CEREBROVASCULAR ACCIDENT (CVA), UNSPECIFIED MECHANISM: ICD-10-CM

## 2024-05-22 DIAGNOSIS — G93.41 ACUTE METABOLIC ENCEPHALOPATHY: ICD-10-CM

## 2024-05-22 PROCEDURE — 99350 HOME/RES VST EST HIGH MDM 60: CPT | Mod: GV,S$GLB,, | Performed by: NURSE PRACTITIONER

## 2024-05-23 ENCOUNTER — DOCUMENT SCAN (OUTPATIENT)
Dept: HOME HEALTH SERVICES | Facility: HOSPITAL | Age: 59
End: 2024-05-23
Payer: MEDICARE

## 2024-05-23 VITALS
HEART RATE: 63 BPM | DIASTOLIC BLOOD PRESSURE: 78 MMHG | RESPIRATION RATE: 20 BRPM | OXYGEN SATURATION: 96 % | TEMPERATURE: 98 F | SYSTOLIC BLOOD PRESSURE: 142 MMHG

## 2024-05-23 NOTE — PROGRESS NOTES
[facility]  Medical Chronic Care Home Visit  Encounter Provider: CHRISTINA Smith NP  PCP: Sowmya Mccain MD  Consult Requested By: Doris Morris    HISTORY OF PRESENT ILLNESS      Patient ID: Eva Bloom is a 58 y.o. female is being seen in the home due to physical debility that presents a taxing effort to leave the home, to mitigate high risk of hospital readmission and/or due to the limited availability of reliable or safe options for transportation to the point of access to the provider. Prior to treatment on this visit the chart was reviewed and patient verbal consent was obtained.    Social History:    Ms. Bloom has been  to her  since 1988 for 36 years, and she has 2 sons. Youngest son lives in [place]. She has 5 grandchildren, and no great   nor patient served in the [place] . Ms. Velasquez worked as a  at [employer] for 23 years, until she became sick. She had kidney removed in 2016. Ms. Velasquez has 5 (siblings) 3 sisters and 2 brothers and they are all living. Patient's father has 4 additional daughters.     Chronic medical conditions synopsis:    Ms. Bloom is being seen today in conjunction with patient's PCP Dr. Mccain.    Today, Ms. Bloom is seen@home to continue medical services Ms. Bloom is sitting on the sofa in the her living room, and her  Otis is present. She is AA&Ox3 person, place. Patient lives at home with her , who assists in her care. She ambulates with walker but requires assistance with all ADLs.  reports she fell 2 weeks and has not had any falls since. Due to fall, she fractured right arm ulnar. No c/o pain at present. She goes to dialysis on T/TH/Saturday [facility]. Dialysis access to left chest wall.  Appetite has been fair. Weight fluctuates.     -patient appears thin and frail;BLE weakness        Ms. Bloom and family would like to pursue full treatment at this time and are not amenable to hospice  and/or palliative care. Patient was taken off of hospice months ago.       requires the head of the bed to be elevated more than 30 degrees most of the time due to having a CVA, CHF, high risk for aspiration, and having physical debility & ESRD on dialysis.  The positioning of the body cannot be sufficiently resolved by the use of pillows and wedges. Recently had fall with fx to right ulnar.     Note:    Discussed Hospice with patient's  Mr. Berg. States he had her evaluated but hospice company does not cover dialysis so he does not want hospice at this time. He wants his wife to continue going to dialysis.   I have discussed the cessation of our palliative care program in the home and have offered [facility] referral to be placed to see patient, however, patient and family feel it is unnecessary d/t possible restart of cancer treatment. No additional needs at this this time. All of my information was given to the patient and family if any questions or concerns arise.      DECISION MAKING TODAY       Assessment & Plan:    1. Cerebrovascular accident (CVA), unspecified mechanism  -     HOSPITAL BED FOR HOME USE  -Turn Q2 hours  -f/u with neurology    2. Chronic combined systolic and diastolic congestive heart failure  -continue Metoprolol  -monitor weight and call PCP if weight gain >3 pounds      3. Metastatic renal cell carcinoma to lung, unspecified laterality  -f/u with oncologist  -discussed hospice as an option as well as continued PC; refused by patient and  at this time    4. Counseling regarding advance care planning and goals of care  --Discussed goals of care with  and would like his wife to get stronger and wants her to continue with dialysis    5. ESRD (end stage renal disease) on dialysis  Overview:  - Tuesday, Thursday, and Saturday  - Chelsie Hart, nephrology      ENVIRONMENT OF CARE      Family and/or Caregiver present at visit?  Yes  Name of  Caregiver: spouse Otis Bloom  History provided by: caregiver    Advance Care Planning   Advanced Care Planning Status:DNR (per )  Patient has had an ACP conversation  Living Will: No  Power of : No  LaPOST: No    Does Caregiver have HCPoA: Yes (not in writing)  Changes today: none       Review of Symptoms      Symptom Assessment (ESAS 0-10 Scale)  Pain:  0  Dyspnea:  0  Anxiety:  0  Nausea:  0  Depression:  0  Anorexia:  0  Fatigue:  0  Insomnia:  0  Restlessness:  0  Agitation:  0     CAM / Delirium:  Negative  Constipation:  Negative  Diarrhea:  Positive    Constipation:  No constipation    Bowel Management Plan (BMP):  No      Comments:  LBJESUS MANUEL 1/22 loose    Performance Status:  50    Karnofsky Performance Scale:  50%    Living Arrangements:  Lives with spouse    Psychosocial/Cultural:   See Palliative Psychosocial Note: Yes  **Primary  to Follow**  Palliative Care  Consult: No    Spiritual:  F - Justa and Belief:  Mosque  I - Importance:  Highly  C - Community:  [facility]  A - Address in Care:  No spiritual needs identified    Advance Care Planning   Advance Directives:   Living Will: No        Oral Declaration: No    LaPOST: No (reviewed form with  and pateint; left at house for  to complete)    Do Not Resuscitate Status: No    Medical Power of : No        Oral Declaration: No    Agent's Name:  Otis Bloom ()   Agent's Contact Number:  [phone] cell    Decision Making:  Patient answered questions and Family answered questions  Goals of Care: What is most important right now is to focus on spending time at home, avoiding the hospital, remaining as independent as possible, quality of life, even if it means sacrificing a little time, improvement in condition but with limits to invasive therapies, comfort and QOL . Accordingly, we have decided that the best plan to meet the patient's goals includes continuing with treatment.        Impression upon entering the home:  Physical Dwelling: single family home   Appearance of home environment: cleaniness: clean, walking pathways: clear, lighting: adequate, and home structure: sound structure  Functional Status: moderate assistance  Mobility: ambulatory with device  Nutritional access:  appetite fluctuates. Fair; drinking boost 1 a day  Home Health: Yes, HH [facility] with nurse, PT,     DME/Supplies: wheelchair, bedside commode, shower chair, and metal walker      Diagnostic tests reviewed/disposition: I have reviewed all completed as well as pending diagnostic tests at the time of discharge.  Disease/illness education: Cancer and ESRD on dialysis  Establishment or re-establishment of referral orders for community resources: No other necessary community resources.   Discussion with other health care providers: No discussion with other health care providers necessary.   Does patient have a PCP at [facility]? Yes   Repatriation plan with PCP? Care at Home reason: mobility   Does patient have an ostomy (ileostomy, colostomy, suprapubic catheter, nephrostomy tube, tracheostomy, PEG tube, pleurex catheter, cholecystostomy, etc)? No  Were BPAs reviewed? Yes    Social History     Socioeconomic History    Marital status:     Number of children: 2   Occupational History    Occupation: [employer]     Employer: [id number]   Tobacco Use    Smoking status: Never     Passive exposure: Never    Smokeless tobacco: Never   Substance and Sexual Activity    Alcohol use: No    Drug use: Yes     Types: Hydrocodone    Sexual activity: Not Currently     Social Determinants of Health     Financial Resource Strain: Low Risk  (9/28/2023)    Overall Financial Resource Strain (CARDIA)     Difficulty of Paying Living Expenses: Not hard at all   Food Insecurity: No Food Insecurity (9/28/2023)    Hunger Vital Sign     Worried About Running Out of Food in the Last Year: Never true     Ran Out of Food in the Last Year: Never  true   Transportation Needs: No Transportation Needs (9/28/2023)    PRAPARE - Transportation     Lack of Transportation (Medical): No     Lack of Transportation (Non-Medical): No   Physical Activity: Inactive (9/28/2023)    Exercise Vital Sign     Days of Exercise per Week: 0 days     Minutes of Exercise per Session: 0 min   Stress: No Stress Concern Present (9/28/2023)    [employer] [place] [employer] - Occupational Stress Questionnaire     Feeling of Stress : Not at all   Housing Stability: Low Risk  (9/28/2023)    Housing Stability Vital Sign     Unable to Pay for Housing in the Last Year: No     Number of Places Lived in the Last Year: 1     Unstable Housing in the Last Year: No         OBJECTIVE:     Vital Signs:  Vitals:    05/22/24 1100   BP: (!) 142/78   Pulse: 63   Resp: 20   Temp: 97.9 °F (36.6 °C)       Review of Systems   Constitutional:  Positive for appetite change (not eating as much) and fatigue. Negative for activity change.   HENT:  Positive for rhinorrhea and sinus pain. Negative for trouble swallowing.    Respiratory:  Positive for cough (np cough at times). Negative for choking, chest tightness and shortness of breath.    Cardiovascular:  Negative for chest pain and leg swelling.   Gastrointestinal:  Negative for abdominal pain, constipation and diarrhea.   Genitourinary:  Positive for dysuria.        Does not make urine   Musculoskeletal:  Positive for gait problem. Negative for back pain and neck pain.        Fx right arm with splint/ace wrap   Skin:  Negative for rash and wound.   Neurological:  Positive for weakness. Negative for dizziness, tremors and seizures.        Right arm weakness had AVF  New access chest   Psychiatric/Behavioral:  Positive for confusion (at night), hallucinations and sleep disturbance.        Physical Exam  Vitals and nursing note reviewed. Exam conducted with a chaperone present.   Constitutional:       Appearance: Normal appearance.   HENT:      Head:  Normocephalic and atraumatic.      Right Ear: There is no impacted cerumen.      Left Ear: There is no impacted cerumen.      Nose: Rhinorrhea present.      Mouth/Throat:      Mouth: Mucous membranes are moist.   Eyes:      Extraocular Movements: Extraocular movements intact.      Conjunctiva/sclera: Conjunctivae normal.   Cardiovascular:      Rate and Rhythm: Normal rate and regular rhythm.      Pulses: Normal pulses.      Heart sounds: Murmur (grade III/VI) heard.   Pulmonary:      Effort: Pulmonary effort is normal.      Breath sounds: Normal breath sounds.      Comments: BBS CTA  Abdominal:      General: Bowel sounds are normal.      Palpations: Abdomen is soft.   Genitourinary:     Comments: Anuric   Musculoskeletal:         General: Swelling present. Normal range of motion.      Cervical back: Normal range of motion and neck supple.      Right lower leg: No edema.      Left lower leg: No edema.      Comments: Limited ROM to right arm/right wrist; swelling to fingers   Skin:     General: Skin is warm and dry.      Capillary Refill: Capillary refill takes less than 2 seconds.      Coloration: Skin is pale.      Comments: Left CW Jan catheter   Neurological:      Mental Status: She is alert.      Motor: Weakness present.      Gait: Gait abnormal.      Comments: AA&Ox1-2 person, place   Psychiatric:         Mood and Affect: Mood normal.         Behavior: Behavior normal.       INSTRUCTIONS FOR PATIENT:     Scheduled Follow-up, Appts Reviewed with Modifications if Needed: Yes  Future Appointments   Date Time Provider Department Center   6/14/2024  9:20 AM [facility] LAB [facility]   6/14/2024 10:20 AM [facility] CT2 LIMIT 500 LBS [facility] CT SCAN [facility]   6/17/2024  9:30 AM Ben Rivera MD [facility] HEMONC3 [facility]   7/5/2024  9:00 AM ECHO, [facility] EKG [facility]   7/11/2024 10:00 AM Shayne Walker MD [facility] CARDIO [facility]   8/14/2024  9:15 AM Corby Casey MD [facility] VAS DONNA  [facility] Cli         Current Outpatient Medications:     acetaminophen (TYLENOL) 500 MG tablet, Take 500 mg by mouth every 6 (six) hours as needed for Pain., Disp: , Rfl:     acetaminophen (TYLENOL) 500 MG tablet, Take 1 tablet (500 mg total) by mouth every 4 (four) hours as needed for Pain or Temperature greater than (100.5 or greater)., Disp: 30 tablet, Rfl: 0    albuterol-ipratropium (DUO-NEB) 2.5 mg-0.5 mg/3 mL nebulizer solution, Take by nebulization., Disp: , Rfl:     apixaban (ELIQUIS) 2.5 mg Tab, Take 1 tablet (2.5 mg total) by mouth 2 (two) times daily., Disp: 60 tablet, Rfl: 5    aspirin (ECOTRIN) 81 MG EC tablet, Take 1 tablet (81 mg total) by mouth once daily., Disp: 90 tablet, Rfl: 2    atorvastatin (LIPITOR) 40 MG tablet, Take 1 tablet (40 mg total) by mouth once daily., Disp: 90 tablet, Rfl: 3    bisacodyL (DULCOLAX) 10 mg Supp, Place 10 mg rectally., Disp: , Rfl:     cabozantinib (CABOMETYX) 60 mg Tab, TAKE ONE TABLET BY MOUTH ONCE DAILY AT THE SAME TIME ON AN EMPTY STOMACH AT LEAST 1 HOUR BEFORE OR 2 HOURS AFTER EATING. AVOID GRAPEFRUIT PRODUCTS (Patient not taking: Reported on 5/23/2024), Disp: 30 tablet, Rfl: 4    doxycycline (VIBRA-TABS) 100 MG tablet, Take 1 tablet (100 mg total) by mouth 2 (two) times daily., Disp: 14 tablet, Rfl: 0    epoetin david-epbx (RETACRIT INJ), Epoetin david - epbx (Retacrit) (Patient not taking: Reported on 5/23/2024), Disp: , Rfl:     hyoscyamine (LEVSIN/SL) 0.125 mg Subl, Place under the tongue., Disp: , Rfl:     lanthanum (FOSRENOL) 1000 MG chewable tablet, Take 1 tablet by mouth., Disp: , Rfl:     levothyroxine (SYNTHROID) 75 MCG tablet, Take 1 tablet (75 mcg total) by mouth once daily., Disp: 90 tablet, Rfl: 2    lidocaine-prilocaine (EMLA) cream, APPLY ATLEAST 30 MINUTES BEFORE TREATMENT 3 TIMES A WEEK, Disp: 30 g, Rfl: 11    LORAZEPAM INTENSOL 2 mg/mL Conc, Take by mouth. (Patient not taking: Reported on 5/23/2024), Disp: , Rfl:     metoprolol succinate  (TOPROL-XL) 25 MG 24 hr tablet, Take 1 tablet (25 mg total) by mouth once daily., Disp: 30 tablet, Rfl: 11    mirtazapine (REMERON) 7.5 MG Tab, Take 1 tablet (7.5 mg total) by mouth every evening., Disp: 90 tablet, Rfl: 2    mv,Ca,min-folic acid-vit K1 (ONE-A-DAY WOMEN'S 50 PLUS) 400-20 mcg Tab, Take 1 tablet by mouth once daily., Disp: , Rfl:     naloxone (NARCAN) 1 mg/mL injection, 2 mg (1 mg per nostril) by Nasal route as needed for opioid overdose; may repeat in 3 to 5 minutes if not effective. Call 911, Disp: 2 mL, Rfl: 11    nystatin (MYCOSTATIN) cream, SMARTSIG:sparingly Topical 2-4 Times Daily PRN, Disp: , Rfl:     ondansetron (ZOFRAN-ODT) 4 MG TbDL, Take by mouth., Disp: , Rfl:     oxyCODONE (ROXICODONE) 5 MG immediate release tablet, Take 1 tablet (5 mg total) by mouth every 6 (six) hours as needed for Pain. (Patient not taking: Reported on 5/23/2024), Disp: 11 tablet, Rfl: 0    pantoprazole (PROTONIX) 40 MG tablet, Take 1 tablet (40 mg total) by mouth 2 (two) times daily., Disp: 60 tablet, Rfl: 11    traMADoL (ULTRAM) 50 mg tablet, Take 1 tablet (50 mg total) by mouth every 12 (twelve) hours as needed for Pain., Disp: 30 each, Rfl: 1  No current facility-administered medications for this visit.    Facility-Administered Medications Ordered in Other Visits:     0.9%  NaCl infusion, , Intravenous, Continuous, Soni Bhatti MD, New Bag at 07/21/23 1116    Medication Reconciliation:  Were medications changed during this appointment? No  Were medications in the home? Yes  Is the patient taking the medications as directed? Yes  Does the patient/caregiver understand the medications and changes? Yes  Does updated med list accurately reflects meds patient is currently taking? Yes    Total clinical care time was 60min. Reviewed Chart and PCP's notes then the following issues were discussed:  PMHx, PSHx, social history, reviewed medications, CVA, chronic combined systolic and diastolic congestive heart failure,  metastatic renal cell carcinoma to lung, ESRD on dialysis. Discussed hospice and benefits. Discussed hospice philosophy      RAÚL Ann-ACNP [facility]

## 2024-05-23 NOTE — PATIENT INSTRUCTIONS
Instructions:  - [facility] Nurse Practitioner to schedule home follow-up visit with patient in 10 weeks.  - Continue all medications, treatments and therapies as ordered.   - Follow all instructions, recommendations as discussed.  - Maintain Safety Precautions at all times.  - Attend all medical appointments as scheduled.  - For worsening symptoms: call Primary Care Physician or Nurse Practitioner.  - For emergencies, call 911 or immediately report to the nearest emergency room.  - Limit Risks of environmental exposure to coronavirus/COVID-19 as discussed including: social distancing, hand hygiene, and limiting departures from the home for necessities only.

## 2024-05-24 ENCOUNTER — DOCUMENT SCAN (OUTPATIENT)
Dept: HOME HEALTH SERVICES | Facility: HOSPITAL | Age: 59
End: 2024-05-24
Payer: MEDICARE

## 2024-05-27 NOTE — PROGRESS NOTES
"  Subjective:       Patient ID: Eva Bloom is a 58 y.o. female.    Chief Complaint: RCC    HPI     58 y.o.female to clinic for follow up for metastatic RCC. Patient was previously on hospice but now not enrolled as she wants to continue dialysis. She is feeling stronger, remains off cabozantinib. Pt still is on ESRD with HD every TTS. Recent fall a few weeks ago but walking better with cane. She reports she had some mild leg pain post fall but that it is not significant.    ECOG 3. She is accompanied with her . She presents in a wheelchair.      Oncologic History (From Chart and Patient):  Eva Bloom is a 58 y.o.female with ESRD on HD who was found to have two L renal masses. She underwent L lap nephrectomy on 1/12/16. 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 Margins were negative.  She healed from surgery well, but then developed a large ovarian mass.  This was removed by gyn along with multiple sites of metastatic disease in the pelvis.  Path was c/w recurrence of RCC.     11/20/16 Pelvic ultrasound reveals "Large heterogeneous cystic and solid mass which appears to arise from the right ovary with worrisome imaging features for malignancy.  Differential diagnosis includes malignant tumors such as serous or mucinous cystadenocarcinoma.  It is important to note that the patient is at risk for ovarian torsion due to the size of the mass.Multiple uterine fibroids are identified with the largest in the uterine fundus."    11/22/16 pathology reveals "FINAL PATHOLOGIC DIAGNOSIS-RIGHT OVARY AND FALLOPIAN TUBE, RIGHT SALPINGO-OOPHORECTOMY:  -Positive for malignancy, high grade carcinoma morphologically and immunohistochemically consistent with metastasis from the patient's known renal cell carcinoma"    Review of Systems   Constitutional:  Positive for activity change, appetite " change, fatigue and unexpected weight change. Negative for chills and fever.   HENT:  Negative for congestion, hearing loss, mouth sores, postnasal drip, sore throat, tinnitus and voice change.    Eyes:  Negative for pain and visual disturbance.   Respiratory:  Negative for cough, shortness of breath and wheezing.    Cardiovascular:  Negative for chest pain, palpitations and leg swelling.   Gastrointestinal:  Negative for abdominal pain, constipation, diarrhea, nausea and vomiting.   Endocrine: Negative for cold intolerance and heat intolerance.   Genitourinary:  Negative for difficulty urinating, dyspareunia, dysuria, frequency, menstrual problem, urgency, vaginal bleeding, vaginal discharge and vaginal pain.   Musculoskeletal:  Negative for arthralgias and myalgias.        LE ulcerations and pain    Skin:  Negative for color change, rash and wound.   Allergic/Immunologic: Negative for environmental allergies and food allergies.   Neurological:  Negative for weakness, numbness and headaches.   Hematological:  Negative for adenopathy. Does not bruise/bleed easily.   Psychiatric/Behavioral:  Negative for agitation, confusion, hallucinations and sleep disturbance. The patient is not nervous/anxious.    All other systems reviewed and are negative.      Allergies:  Review of patient's allergies indicates:   Allergen Reactions    Allopurinol      Other reaction(s): abnormal transaminases       Medications:  Current Outpatient Medications   Medication Sig Dispense Refill    acetaminophen (TYLENOL) 500 MG tablet Take 500 mg by mouth every 6 (six) hours as needed for Pain.      acetaminophen (TYLENOL) 500 MG tablet Take 1 tablet (500 mg total) by mouth every 4 (four) hours as needed for Pain or Temperature greater than (100.5 or greater). 30 tablet 0    albuterol-ipratropium (DUO-NEB) 2.5 mg-0.5 mg/3 mL nebulizer solution Take by nebulization.      apixaban (ELIQUIS) 2.5 mg Tab Take 1 tablet (2.5 mg total) by mouth 2 (two)  times daily. 60 tablet 5    aspirin (ECOTRIN) 81 MG EC tablet Take 1 tablet (81 mg total) by mouth once daily. 90 tablet 2    atorvastatin (LIPITOR) 40 MG tablet Take 1 tablet (40 mg total) by mouth once daily. 90 tablet 3    bisacodyL (DULCOLAX) 10 mg Supp Place 10 mg rectally.      doxycycline (VIBRA-TABS) 100 MG tablet Take 1 tablet (100 mg total) by mouth 2 (two) times daily. 14 tablet 0    hyoscyamine (LEVSIN/SL) 0.125 mg Subl Place under the tongue.      lanthanum (FOSRENOL) 1000 MG chewable tablet Take 1 tablet by mouth.      levothyroxine (SYNTHROID) 75 MCG tablet Take 1 tablet (75 mcg total) by mouth once daily. 90 tablet 2    lidocaine-prilocaine (EMLA) cream APPLY ATLEAST 30 MINUTES BEFORE TREATMENT 3 TIMES A WEEK 30 g 11    metoprolol succinate (TOPROL-XL) 25 MG 24 hr tablet Take 1 tablet (25 mg total) by mouth once daily. 30 tablet 11    mirtazapine (REMERON) 7.5 MG Tab Take 1 tablet (7.5 mg total) by mouth every evening. 90 tablet 2    mv,Ca,min-folic acid-vit K1 (ONE-A-DAY WOMEN'S 50 PLUS) 400-20 mcg Tab Take 1 tablet by mouth once daily.      naloxone (NARCAN) 1 mg/mL injection 2 mg (1 mg per nostril) by Nasal route as needed for opioid overdose; may repeat in 3 to 5 minutes if not effective. Call 911 2 mL 11    nystatin (MYCOSTATIN) cream SMARTSIG:sparingly Topical 2-4 Times Daily PRN      ondansetron (ZOFRAN-ODT) 4 MG TbDL Take by mouth.      pantoprazole (PROTONIX) 40 MG tablet Take 1 tablet (40 mg total) by mouth 2 (two) times daily. 60 tablet 11    traMADoL (ULTRAM) 50 mg tablet Take 1 tablet (50 mg total) by mouth every 12 (twelve) hours as needed for Pain. 30 each 1    cabozantinib (CABOMETYX) 60 mg Tab TAKE ONE TABLET BY MOUTH ONCE DAILY AT THE SAME TIME ON AN EMPTY STOMACH AT LEAST 1 HOUR BEFORE OR 2 HOURS AFTER EATING. AVOID GRAPEFRUIT PRODUCTS (Patient not taking: Reported on 5/23/2024) 30 tablet 4    epoetin david-epbx (RETACRIT INJ) Epoetin david - epbx (Retacrit) (Patient not taking:  Reported on 5/23/2024)      LORAZEPAM INTENSOL 2 mg/mL Conc Take by mouth. (Patient not taking: Reported on 5/23/2024)      oxyCODONE (ROXICODONE) 5 MG immediate release tablet Take 1 tablet (5 mg total) by mouth every 6 (six) hours as needed for Pain. (Patient not taking: Reported on 5/23/2024) 11 tablet 0     No current facility-administered medications for this visit.     Facility-Administered Medications Ordered in Other Visits   Medication Dose Route Frequency Provider Last Rate Last Admin    0.9%  NaCl infusion   Intravenous Continuous Soni Bhatti MD   New Bag at 07/21/23 1116       PMH:  Past Medical History:   Diagnosis Date    Anemia     Anticoagulant long-term use     Atrial fibrillation     Bronchitis 03/01/2017    Cancer 2016    kidney cancer    CHF (congestive heart failure), NYHA class II, chronic, systolic     CMV (cytomegalovirus) antibody positive     CVA (cerebral vascular accident) 1/3/2024    Encounter for blood transfusion     ESRD (end stage renal disease)     Essential hypertension 09/23/2015    H/O herpes simplex type 2 infection     Herpes simplex type 1 antibody positive     History of kidney cancer     s/p left nephrectomy 1/2016    Hyperparathyroidism, unspecified     Hyperuricemia without signs of inflammatory arthritis and tophaceous disease     Kidney stones     LGSIL (low grade squamous intraepithelial dysplasia)     Myocardiopathy 07/21/2017    Prediabetes     Proteinuria     Renal disorder     Thyroid nodule     Urate nephropathy        PSH:  Past Surgical History:   Procedure Laterality Date    BREAST CYST EXCISION      COLONOSCOPY N/A 11/12/2015    COLONOSCOPY N/A 03/12/2021    Procedure: COLONOSCOPY;  Surgeon: Brendon Lanier MD;  Location: [facility];  Service: Endoscopy;  Laterality: N/A;  covid test 3/9, labs prior, prep instr mailed -ml    EXCISION OF ARTERIOVENOUS FISTULA Left 09/27/2023    Procedure: EXCISION, AV FISTULA;  Surgeon: FARIDEH Muñoz III, MD;   [facility];  Service: Vascular;  Laterality: Left;  LUE AV graft excision    INSERTION OF BIVENTRICULAR IMPLANTABLE CARDIOVERTER-DEFIBRILLATOR (ICD)  04/2021    INSERTION OF TUNNELED CENTRAL VENOUS CATHETER (CVC) WITH SUBCUTANEOUS PORT N/A 01/25/2023    Procedure: INSERTION, DUAL LUMEN CATHETER WITH PORT, WITH IMAGING GUIDANCE;  Surgeon: FARIDEH Muñoz III, MD;  [facility];  Service: Peripheral Vascular;  Laterality: N/A;  possinble permcath placment    NEPHRECTOMY-LAPAROSCOPIC Left 01/12/2016    PERCUTANEOUS TRANSLUMINAL ANGIOPLASTY OF ARTERIOVENOUS FISTULA Left 04/19/2023    Procedure: PTA, AV FISTULA;  Surgeon: FARIDEH Muñoz III, MD;  Location: [facility] CATH LAB;  Service: Peripheral Vascular;  Laterality: Left;    PERITONEAL CATHETER INSERTION      Permacath insertion  01/12/2017    PLACEMENT OF ARTERIOVENOUS GRAFT  12/28/2022    Procedure: INSERTION, GRAFT, ARTERIOVENOUS;  Surgeon: FARIDEH Muñoz III, MD;  [facility];  Service: Peripheral Vascular;;    REMOVAL, GRAFT, ARTERIOVENOUS, UPPER EXTREMITY Left 01/25/2023    Procedure: REMOVAL, GRAFT, ARTERIOVENOUS, UPPER EXTREMITY;  Surgeon: FARIDEH Muñoz III, MD;  [facility];  Service: Peripheral Vascular;  Laterality: Left;    REVISION OF ARTERIOVENOUS FISTULA Left 05/01/2023    Procedure: REVISION, AV FISTULA;  Surgeon: FARIDEH Muñoz III, MD;  Location: [facility] OR [facility];  Service: Peripheral Vascular;  Laterality: Left;  LUE AVG revision    REVISION OF PROCEDURE INVOLVING ARTERIOVENOUS GRAFT Left 12/28/2022    Procedure: REVISION, PROCEDURE INVOLVING ARTERIOVENOUS GRAFT;  Surgeon: FARIDEH Muñoz III, MD;  [facility];  Service: Peripheral Vascular;  Laterality: Left;    REVISION OF PROCEDURE INVOLVING ARTERIOVENOUS GRAFT Left 07/21/2023    Procedure: LEFT ARM ARTERIOVENOUS GRAFT EXCISION  AND LIGATION;  Surgeon: Obi Werner MD;  Location: [facility];  Service: Vascular;   Laterality: Left;  Left AVG excision and revision with interposition    SALPINGOOPHORECTOMY Right 2016    KJB---DAVINCI    TONSILLECTOMY      TUBAL LIGATION         FamHx:  Family History   Problem Relation Name Age of Onset    Hypertension Mother      Diabetes Father      Kidney disease Father      No Known Problems Sister Sophy     Heart disease Sister Maria A     No Known Problems Sister Claudine     No Known Problems Brother Dax     No Known Problems Brother Don     Cataracts Maternal Aunt      No Known Problems Maternal Uncle      No Known Problems Paternal Aunt      No Known Problems Paternal Uncle      No Known Problems Maternal Grandmother      Diabetes Maternal Grandfather      No Known Problems Paternal Grandmother      No Known Problems Paternal Grandfather      No Known Problems Son      No Known Problems Son      No Known Problems Other      Breast cancer Neg Hx      Colon cancer Neg Hx      Cancer Neg Hx      Stroke Neg Hx      Amblyopia Neg Hx      Blindness Neg Hx      Glaucoma Neg Hx      Macular degeneration Neg Hx      Retinal detachment Neg Hx      Strabismus Neg Hx      Thyroid disease Neg Hx         SocHx:  Social History     Socioeconomic History    Marital status:     Number of children: 2   Occupational History    Occupation: Health Plotter     Employer: [id number]   Tobacco Use    Smoking status: Never     Passive exposure: Never    Smokeless tobacco: Never   Substance and Sexual Activity    Alcohol use: No    Drug use: Yes     Types: Hydrocodone    Sexual activity: Not Currently     Social Determinants of Health     Financial Resource Strain: Low Risk  (9/28/2023)    Overall Financial Resource Strain (CARDIA)     Difficulty of Paying Living Expenses: Not hard at all   Food Insecurity: No Food Insecurity (9/28/2023)    Hunger Vital Sign     Worried About Running Out of Food in the Last Year: Never true     Ran Out of Food in the Last Year: Never true   Transportation Needs: No  Transportation Needs (9/28/2023)    PRAPARE - Transportation     Lack of Transportation (Medical): No     Lack of Transportation (Non-Medical): No   Physical Activity: Inactive (9/28/2023)    Exercise Vital Sign     Days of Exercise per Week: 0 days     Minutes of Exercise per Session: 0 min   Stress: No Stress Concern Present (9/28/2023)    [employer] [place] [employer] - Occupational Stress Questionnaire     Feeling of Stress : Not at all   Housing Stability: Low Risk  (9/28/2023)    Housing Stability Vital Sign     Unable to Pay for Housing in the Last Year: No     Number of Places Lived in the Last Year: 1     Unstable Housing in the Last Year: No         Objective:     Vitals:    06/17/24 0931   BP: 131/85   Pulse: 86   Resp: 18   Temp: 97.5 °F (36.4 °C)           Physical Exam  Vitals and nursing note reviewed.   Constitutional:       General: She is not in acute distress.     Appearance: She is well-developed.      Comments: Thin appearance, in wheelchair   HENT:      Head: Normocephalic and atraumatic.      Nose: Nose normal.      Mouth/Throat:      Pharynx: No oropharyngeal exudate.   Eyes:      General:         Right eye: No discharge.         Left eye: No discharge.      Conjunctiva/sclera: Conjunctivae normal.      Pupils: Pupils are equal, round, and reactive to light.   Neck:      Trachea: No tracheal deviation.   Cardiovascular:      Comments: No swelling to bilateral lower extremities.   Musculoskeletal:         General: No tenderness. Normal range of motion.      Comments:      Skin:     General: Skin is warm and dry.      Coloration: Skin is not pale.      Findings: No erythema or rash.      Comments: Ulcerations on LE   Neurological:      Mental Status: She is alert and oriented to person, place, and time.   Psychiatric:         Behavior: Behavior normal.         Thought Content: Thought content normal.        LABS:  WBC   Date Value Ref Range Status   06/14/2024 4.61 3.90 - 12.70 K/uL  Final     Hemoglobin   Date Value Ref Range Status   06/14/2024 11.4 (L) 12.0 - 16.0 g/dL Final     POC Hematocrit   Date Value Ref Range Status   10/10/2023 32 (L) 36 - 54 %PCV Final     Hematocrit   Date Value Ref Range Status   06/14/2024 41.7 37.0 - 48.5 % Final     Platelets   Date Value Ref Range Status   06/14/2024 186 150 - 450 K/uL Final       Chemistry        Component Value Date/Time     06/14/2024 0834    K 3.7 06/14/2024 0834     06/14/2024 0834    CO2 24 06/14/2024 0834    BUN 29 (H) 06/14/2024 0834    CREATININE 4.3 (H) 06/14/2024 0834    GLU 66 (L) 06/14/2024 0834        Component Value Date/Time    CALCIUM 11.2 (H) 06/14/2024 0834    ALKPHOS 554 (H) 06/14/2024 0834    AST 29 06/14/2024 0834    ALT <5 (L) 06/14/2024 0834    BILITOT 1.5 (H) 06/14/2024 0834        CT Chest Abdomen Pelvis Without Contrast (XPD)  Narrative: EXAMINATION:  CT CHEST ABDOMEN PELVIS WITHOUT CONTRAST(XPD)    CLINICAL HISTORY:  Kidney cancer, monitor; Secondary malignant neoplasm of other specified sites    TECHNIQUE:  Low dose axial images, sagittal and coronal reformations were obtained from the thoracic inlet to the pubic symphysis .  Oral contrast was not administered.    COMPARISON:  Multiple CTs and MRIs, most recent 03/04/2024    FINDINGS:  LINES/TUBES:  Left IJ tunneled hemodialysis catheter terminates in the right atrium.  Left chest wall subcutaneous AICD.    SOFT TISSUES: No axillary or subpectoral lymphadenopathy. Multiple calcified thyroid nodules.    HEART AND MEDIASTINUM: No mediastinal or hilar lymphadenopathy. Multi chamber cardiomegaly.  Moderate pericardial effusion.  Extensive calcifications involving the coronary arteries, mitral annulus, aortic valve, thoracic aorta, pulmonary arteries, and walls of the left atrium.  The main pulmonary artery is normal in size.    PLEURA: No pleural effusion or pneumothorax.    LUNGS AND AIRWAYS: Posterior bowing of the trachea compatible with expiratory phase  imaging.  Bibasilar and dependent subsegmental atelectasis.  No focal consolidation.  There is a cluster of small pulmonary nodules in the right upper lobe, many of which are calcified (6:153).  There are scattered noncalcified micro nodules as well, for example in the right upper lobe on 6:166, which are unchanged.    HEPATOBILIARY: Punctate calcified granuloma in the right hepatic lobe.  Large peripherally calcified gallstones.  No gallbladder distension or wall thickening.  No bile duct dilatation.    SPLEEN: No splenomegaly.    PANCREAS: No focal masses or ductal dilatation.    ADRENALS: No adrenal nodules.    KIDNEYS/URETERS: Postoperative changes from left nephrectomy.  No suspicious nodules in the nephrectomy bed.  Atrophic right kidney.  There are simple and minimally complex cysts in the right kidney.  No hydronephrosis.    PELVIC ORGANS/BLADDER: Bladder is decompressed.  Extensive uterine calcifications.  No adnexal mass.    PERITONEUM / RETROPERITONEUM: Small volume ascites.  No free air.    LYMPH NODES: There is a 3.2 x 2.6 cm soft tissue mass in the small bowel mesentery (3:97), which is unchanged in size from recent studies but has slowly enlarged dating back to 2015.  No new lymphadenopathy.    VESSELS: Extensive vascular calcifications throughout the abdomen and pelvis.    GI TRACT: There is wall thickening in the rectum with perirectal fat stranding.  No bowel obstruction.    BONES: Diffusely heterogeneous bone marrow suggesting renal osteodystrophy.  Subacute fractures of the left 8th and 9th ribs.  Acute fractures of the right superior and inferior pubic rami.  No aggressive osseous lesion.  Impression: History of renal cell carcinoma status post left nephrectomy.  No local recurrence.    3.2 cm soft tissue mass in the small bowel mesentery, unchanged in size from recent studies but slowly enlarged dating back to 2015.  No new lymphadenopathy.    No convincing thoracic metastases.    Acute  fractures of the right superior and inferior pubic rami.    Other findings as described.    This report was flagged in Epic as abnormal.    Electronically signed by: Asad Martin  Date:    06/14/2024  Time:    11:40      Assessment:       No diagnosis found.          Plan:        Cancer Staging   Renal cell carcinoma of left kidney  Staging form: Kidney, AJCC 8th Edition  - Pathologic stage from 1/12/2016: Stage Unknown (pT1b(2), pNX, cM0) - Unsigned  - Clinical stage from 11/22/2016: Stage IV (rcTX, cN0, pM1) - Signed by Liset Ngo NP on 9/13/2023    Pt has had SD for long time on [place], and last scans were largely stable, however, no longer able to tolerate therapy due to comorbidities (HD/ESRD, etc) and LE infection/wounds.  She was most recently enrolled on hospice but was then told she was not allowed to continue dialysis. Long discussion with patient and her accompanying  regarding current clinical status and that she does not qualify for further RCC treatment. They are in agreement and would like see current status of cancer activity, which is not unreasonable.     Scans show slow enlargement of retroperitoneal mass which is not surprising that we are currently holding therapy. This is relatively stable from last visit. More concerning, scans show Acute fractures of the right superior and inferior pubic rami. Will refer to ortho for evaluation. Additionally, Corrected calcium 11.8, will reach out to nephrology as she has dialysis tomorrow.    RTC 3 months with CT scan, labs (CBC,CMP), and to see Dr. Rivera.    Patient was also seen and examined by Dr. Rivera. Patient is in agreement with the proposed treatment plan. All questions were answered to the patient's satisfaction. Pt knows to call clinic if anything is needed before the next clinic visit.      Liset Ngo, MSN, APRN, FNP-C  Hematology and Medical Oncology  Manager- [facility]  Nurse  Practitioner/Clinical Investigator, [facility]  Nurse Practitioner to Dr. Ben Rivera      I have reviewed the notes, assessments, and/or procedures performed by the nurse practitioner, as above.  I have personally interviewed the patient at the beside, and rounded with the nurse practitioner. I formulated the plan of care.  I concur with her documentation of Eva Bloom.      I, Dr. Ben Rivera, personally spent more than 40 mins during this encounter.     Ben Rivera M.D., M.S., F.A.C.P.  Hematology/Oncology Attending  [facility], [facility]             Med Onc Chart Routing      Follow up with physician . RTC 3 months with CT scan, labs (CBC,CMP, PTHrp level), and to see Dr. Rivera.   Follow up with MARTA    Infusion scheduling note    Injection scheduling note    Labs    Imaging    Pharmacy appointment    Other referrals       Additional referrals needed  refer ortho

## 2024-06-04 ENCOUNTER — TELEPHONE (OUTPATIENT)
Dept: VASCULAR SURGERY | Facility: CLINIC | Age: 59
End: 2024-06-04
Payer: MEDICARE

## 2024-06-04 NOTE — TELEPHONE ENCOUNTER
"----- Message from Mikael Harris sent at 6/4/2024  2:20 PM CDT -----  Regarding: Otis ""  Type:  Sooner Appointment Request     Patient is requesting a sooner appointment.  Patient declined first available appointment listed as well as another facility and provider .  Patient will not accept being placed on the waitlist and is requesting a message be sent to doctor.     Name of Caller: Otis ""     When is the first available appointment? 07/08    Symptoms: stated that one of the line on the catheter is closed and needs to get it changed. Please reach out to the patient's .     Would the patient rather a call back or a response via [facility]? Callback     Best Call Back Number: [phone]      Additional Information:  "

## 2024-06-04 NOTE — TELEPHONE ENCOUNTER
I called  and no answer. Left message returning call and to call our office. Message sent to provider. Called  back and he states wife told him that the nurse at [facility] told her it looks like one of her ports to her LCW catheter is  trying to clot. Pt. had dialysis treatment today. Do you want pt.to come to the office  to see Martita tomorrow to check catheter.

## 2024-06-05 DIAGNOSIS — N18.6 ESRD (END STAGE RENAL DISEASE) ON DIALYSIS: Primary | ICD-10-CM

## 2024-06-05 DIAGNOSIS — Z99.2 ESRD (END STAGE RENAL DISEASE) ON DIALYSIS: Primary | ICD-10-CM

## 2024-06-05 NOTE — TELEPHONE ENCOUNTER
placed order for IR consult and I called IR and aware. Spoke with pt. and aware of consult. Called and spoke with Rihca at dialysis center and she said pt. arterial line is kinked and she can not get it unkinked and causes alarms to go off on machine. Aware  placed consult for IR. Message sent to provider of issue with pt.catheter.

## 2024-06-06 DIAGNOSIS — R94.5 ABNORMAL RESULTS OF LIVER FUNCTION STUDIES: ICD-10-CM

## 2024-06-06 DIAGNOSIS — T82.590D MALFUNCTION OF ARTERIOVENOUS DIALYSIS FISTULA, SUBSEQUENT ENCOUNTER: Primary | ICD-10-CM

## 2024-06-07 ENCOUNTER — HOSPITAL ENCOUNTER (OUTPATIENT)
Dept: INTERVENTIONAL RADIOLOGY/VASCULAR | Facility: HOSPITAL | Age: 59
Discharge: HOME | End: 2024-06-07
Attending: SURGERY
Payer: MEDICARE

## 2024-06-07 VITALS
HEART RATE: 86 BPM | OXYGEN SATURATION: 97 % | DIASTOLIC BLOOD PRESSURE: 84 MMHG | TEMPERATURE: 98 F | RESPIRATION RATE: 18 BRPM | SYSTOLIC BLOOD PRESSURE: 121 MMHG

## 2024-06-07 DIAGNOSIS — Z99.2 ESRD (END STAGE RENAL DISEASE) ON DIALYSIS: ICD-10-CM

## 2024-06-07 DIAGNOSIS — T82.41XA HEMODIALYSIS CATHETER DYSFUNCTION, INITIAL ENCOUNTER: Primary | ICD-10-CM

## 2024-06-07 DIAGNOSIS — N18.6 ESRD (END STAGE RENAL DISEASE) ON DIALYSIS: ICD-10-CM

## 2024-06-07 LAB
INR PPP: 1.1 (ref 0.8–1.2)
PROTHROMBIN TIME: 11.8 SEC (ref 9–12.5)

## 2024-06-07 PROCEDURE — 63600175 PHARM REV CODE 636 W HCPCS: Performed by: RADIOLOGY

## 2024-06-07 PROCEDURE — C1725 CATH, TRANSLUMIN NON-LASER: HCPCS

## 2024-06-07 PROCEDURE — 36415 COLL VENOUS BLD VENIPUNCTURE: CPT | Performed by: RADIOLOGY

## 2024-06-07 PROCEDURE — 85610 PROTHROMBIN TIME: CPT | Performed by: RADIOLOGY

## 2024-06-07 PROCEDURE — 25500020 PHARM REV CODE 255: Performed by: SURGERY

## 2024-06-07 PROCEDURE — C1769 GUIDE WIRE: HCPCS

## 2024-06-07 PROCEDURE — 27201423 OPTIME MED/SURG SUP & DEVICES STERILE SUPPLY

## 2024-06-07 PROCEDURE — 25000003 PHARM REV CODE 250: Performed by: RADIOLOGY

## 2024-06-07 PROCEDURE — C1894 INTRO/SHEATH, NON-LASER: HCPCS

## 2024-06-07 PROCEDURE — 99152 MOD SED SAME PHYS/QHP 5/>YRS: CPT

## 2024-06-07 PROCEDURE — C1750 CATH, HEMODIALYSIS,LONG-TERM: HCPCS

## 2024-06-07 RX ORDER — LIDOCAINE HYDROCHLORIDE 10 MG/ML
INJECTION INFILTRATION; PERINEURAL
Status: COMPLETED | OUTPATIENT
Start: 2024-06-07 | End: 2024-06-07

## 2024-06-07 RX ORDER — FENTANYL CITRATE 50 UG/ML
INJECTION, SOLUTION INTRAMUSCULAR; INTRAVENOUS
Status: COMPLETED | OUTPATIENT
Start: 2024-06-07 | End: 2024-06-07

## 2024-06-07 RX ORDER — HYDROCODONE BITARTRATE AND ACETAMINOPHEN 5; 325 MG/1; MG/1
2 TABLET ORAL EVERY 4 HOURS PRN
Status: DISCONTINUED | OUTPATIENT
Start: 2024-06-07 | End: 2024-06-08 | Stop reason: HOSPADM

## 2024-06-07 RX ORDER — CEFAZOLIN SODIUM 1 G/50ML
SOLUTION INTRAVENOUS
Status: COMPLETED | OUTPATIENT
Start: 2024-06-07 | End: 2024-06-07

## 2024-06-07 RX ORDER — MIDAZOLAM HYDROCHLORIDE 2 MG/2ML
INJECTION, SOLUTION INTRAMUSCULAR; INTRAVENOUS
Status: COMPLETED | OUTPATIENT
Start: 2024-06-07 | End: 2024-06-07

## 2024-06-07 RX ORDER — HEPARIN SODIUM 5000 [USP'U]/ML
INJECTION, SOLUTION INTRAVENOUS; SUBCUTANEOUS
Status: COMPLETED | OUTPATIENT
Start: 2024-06-07 | End: 2024-06-07

## 2024-06-07 RX ORDER — MORPHINE SULFATE 10 MG/ML
2 INJECTION INTRAMUSCULAR; INTRAVENOUS; SUBCUTANEOUS
Status: DISCONTINUED | OUTPATIENT
Start: 2024-06-07 | End: 2024-06-08 | Stop reason: HOSPADM

## 2024-06-07 RX ADMIN — LIDOCAINE HYDROCHLORIDE 10 ML: 10 INJECTION, SOLUTION INFILTRATION; PERINEURAL at 10:06

## 2024-06-07 RX ADMIN — CEFAZOLIN SODIUM 2 G: 1 SOLUTION INTRAVENOUS at 10:06

## 2024-06-07 RX ADMIN — HYDROCODONE BITARTRATE AND ACETAMINOPHEN 2 TABLET: 5; 325 TABLET ORAL at 01:06

## 2024-06-07 RX ADMIN — IOHEXOL 10 ML: 350 INJECTION, SOLUTION INTRAVENOUS at 11:06

## 2024-06-07 RX ADMIN — HEPARIN SODIUM 5000 UNITS: 5000 INJECTION, SOLUTION INTRAVENOUS; SUBCUTANEOUS at 10:06

## 2024-06-07 RX ADMIN — MIDAZOLAM HYDROCHLORIDE 1 MG: 1 INJECTION, SOLUTION INTRAMUSCULAR; INTRAVENOUS at 10:06

## 2024-06-07 RX ADMIN — FENTANYL CITRATE 50 MCG: 50 INJECTION, SOLUTION INTRAMUSCULAR; INTRAVENOUS at 10:06

## 2024-06-07 NOTE — BRIEF OP NOTE
Radiology Post-Procedure Note    Pre Op Diagnosis: 1. Malfunction of 23-cm LIJV-approach THDC  Post Op Diagnosis: 2. Lt brachiocephalic and SVC stenoses    Procedure: Fluoroscopic-guided 1. Removal of pre-existing 23-cm LIJV-approach THDC 2. SVC venogram 3. Venoplasty of hemodynamically significant Lt brachiocephalic vein and SVC narrowing with 12-mm balloon 4. Post-venoplasty Lt brachiocephalic vein and SVC venogram 5. Placement of a new 23-cm LIJV-approach THDC    Procedure performed by: Pan Win MD    Written Informed Consent Obtained: Yes  Specimen Removed: NO  Estimated Blood Loss: Minimal    Findings:   Successful fluoroscopic-guided evaluation of 23-cm LIJV-approach THDC with SVC venogram revealing hemodynamically significant Lt brachiocephalic vein SVC narrowing 2/2 fibrin sheath formation. Now s/p successful Lt brachiocephalic vein and SVC venoplasty with 12-mm balloon with no evidence of residual narrowing on post-venoplasty venogram with subsequent successful exchange for a new 23-cm LIJV-approach THDC. Patient tolerated the procedure well. No immediate post-procedural complications noted.     New THDC is ready for use immediately.    Patient transferred back to [facility] for 1 hour post-op monitoring and bed rest prior with HOB elevated at least 30 degrees during bed rest prior to tentative discharge.    Thank you for considering IR for the care of your patient.     Pan Win MD  Interventional Radiology

## 2024-06-07 NOTE — DISCHARGE INSTRUCTIONS
Procedural Sedation  Procedural sedation is medicine to ease discomfort, pain, and anxiety during a procedure. The medicine is often given through an intravenous (IV) line in your arm or hand. In some cases, the medicine may be taken by mouth or inhaled. While you are under sedation, you will likely be awake. But you may not remember anything afterward.  Why procedural sedation is used  Sedation is used for many types of procedures. The goal is to reduce pain, anxiety, and stressful memories of a procedure. It can also help your health care provider treat you. For example, having a broken bone fixed may be easier if you feel relaxed.  Procedural sedation is used only for short, basic procedures. It is not used for complex surgeries. Some procedures that use this type of sedation include:  Dental surgery  Breast biopsy, to take a sample of breast tissue  Endoscopy, to look at gastrointestinal problems  Bronchoscopy, to check for lung problems  Bone or joint realignment, to fix a broken bone or dislocated joint  Minor foot or skin surgery  Electrical cardioversion, to restore a normal heart rhythm  Lumbar puncture, to assess neurological disease  Risks of procedural sedation  Procedural sedation has some risks and possible side effects, such as:  Headache  Nausea and vomiting  Unpleasant memory of the procedure  Lowered rate of breathing  Changes in heart rate and blood pressure (rare)  Inhalation of stomach contents into your lungs (rare)  Side effects will likely go away shortly after the procedure. Your health care team will watch your heart rate and breathing during your sedation. This is to help prevent problems.  Your own risks may vary based on your age and your overall health. They also depend on the type of sedation you are given. Talk with your health care provider about the risks that apply most to you.  Getting ready for procedural sedation  Talk with your health care provider how to get ready for your  procedure. Tell him or her about all the medicines you take. This includes over-the-counter medicines such as ibuprofen. It also includes vitamins, herbs, and other supplements. You may need to stop taking some medicines before the procedure, such as blood thinners and aspirin. If you smoke, you may need to stop. Talk with your health care provider if you need help to stop smoking.  Tell your health care provider if you:  Have had any problems in the past with sedation or anesthesia  Have had any recent changes in your health, such as an infection or fever  Are pregnant or think you may be pregnant  Also, make sure to:  Ask a family member or friend to take you home after the procedure. You cannot drive on the day you receive sedation.  Not eat or drink after midnight the night before your procedure, if advised.  Follow all other instructions from your health care provider.  During your procedural sedation  You may have your procedure in a hospital or a medical clinic. Sedation is done by a trained health care provider. In general, you can expect the following:  You will be given medicine through an IV line in your arm or hand. Or you may receive a shot. The medicine may also be given by mouth. Or you may inhale it through a mask.  If you receive medicine through an IV, you may feel the effects very quickly. You will start to feel relaxed and drowsy.  During the procedure, your heart rate, breathing, and blood pressure will be closely watched. Your breathing and blood pressure may decrease a little. But you will likely not need help with your breathing. You may receive a little extra oxygen through a mask.  You will probably be awake the entire time. If you do fall asleep, you should be easy to wake up, if needed. You should feel little or no pain.  When your procedure is over, the sedative medicine will be stopped.  After your procedural sedation  You will begin to feel more awake and aware. But you will likely be  drowsy for a while afterward. You will be closely watched as you become more alert. You may have a faint memory of the procedure. Or you may not remember it at all.  You should be able to return home within an hour or two after your procedure. Plan to have someone stay with you for a few hours. Side effects such as headache and nausea may go away quickly. Tell your health care provider if they continue.  Dont drive or make any important decisions for at least 24 hours. Be sure to follow all after-care instructions.      When to call your health care provider  Have someone call your health care provider right away if you have any of these:  Drowsiness that gets worse  Weakness or dizziness that gets worse  Repeated vomiting  You cant be awakened   Date Last Reviewed: 2/6/2015  © 4866-2051 [employer]. [address] [place] [address], [place] [address]. All rights reserved. This information is not intended as a substitute for professional medical care. Always follow your healthcare professional's instructions.

## 2024-06-07 NOTE — H&P
Radiology History & Physical      SUBJECTIVE:     Principal Problem: 1. Malfunction of 23-cm LIJV-approach THDC    History of Present Illness:  Eva Bloom is a 58 y.o. Rt-handed female with pertinent PMHx of HTN, cardiomyopathy, systolic/diastolic CHF, PAF on Eliquis, pHTN and ESRD on HD TTS via 23-cm LIJV-approach THDC who is admitted with malfunction of the THDC with inability to aspirate requiring further evaluation.     A new outpatient IR consult received for fluoroscopic-guided evaluation of an LIJV-approach THDC.    Past Medical History:   Diagnosis Date    Anemia     Anticoagulant long-term use     Atrial fibrillation     Bronchitis 03/01/2017    Cancer 2016    kidney cancer    CHF (congestive heart failure), NYHA class II, chronic, systolic     CMV (cytomegalovirus) antibody positive     CVA (cerebral vascular accident) 1/3/2024    Encounter for blood transfusion     ESRD (end stage renal disease)     Essential hypertension 09/23/2015    H/O herpes simplex type 2 infection     Herpes simplex type 1 antibody positive     History of kidney cancer     s/p left nephrectomy 1/2016    Hyperparathyroidism, unspecified     Hyperuricemia without signs of inflammatory arthritis and tophaceous disease     Kidney stones     LGSIL (low grade squamous intraepithelial dysplasia)     Myocardiopathy 07/21/2017    Prediabetes     Proteinuria     Renal disorder     Thyroid nodule     Urate nephropathy      Past Surgical History:   Procedure Laterality Date    BREAST CYST EXCISION      COLONOSCOPY N/A 11/12/2015    COLONOSCOPY N/A 03/12/2021    Procedure: COLONOSCOPY;  Surgeon: Brendon Lanier MD;  Location: [facility];  Service: Endoscopy;  Laterality: N/A;  covid test 3/9, labs prior, prep instr mailed -ml    EXCISION OF ARTERIOVENOUS FISTULA Left 09/27/2023    Procedure: EXCISION, AV FISTULA;  Surgeon: FARIDEH Muñoz III, MD;  Location: [facility] OR [address] [place] [address];  Service: Vascular;  Laterality: Left;  LUE AV graft  excision    INSERTION OF BIVENTRICULAR IMPLANTABLE CARDIOVERTER-DEFIBRILLATOR (ICD)  04/2021    INSERTION OF TUNNELED CENTRAL VENOUS CATHETER (CVC) WITH SUBCUTANEOUS PORT N/A 01/25/2023    Procedure: INSERTION, DUAL LUMEN CATHETER WITH PORT, WITH IMAGING GUIDANCE;  Surgeon: FARIDEH Muñoz III, MD;  Location: [facility] OR [address] [place] [address];  Service: Peripheral Vascular;  Laterality: N/A;  possinble permcath placment    NEPHRECTOMY-LAPAROSCOPIC Left 01/12/2016    PERCUTANEOUS TRANSLUMINAL ANGIOPLASTY OF ARTERIOVENOUS FISTULA Left 04/19/2023    Procedure: PTA, AV FISTULA;  Surgeon: FARIDEH Muñoz III, MD;  Location: [facility] CATH LAB;  Service: Peripheral Vascular;  Laterality: Left;    PERITONEAL CATHETER INSERTION      Permacath insertion  01/12/2017    PLACEMENT OF ARTERIOVENOUS GRAFT  12/28/2022    Procedure: INSERTION, GRAFT, ARTERIOVENOUS;  Surgeon: FARIDEH Muñoz III, MD;  [facility];  Service: Peripheral Vascular;;    REMOVAL, GRAFT, ARTERIOVENOUS, UPPER EXTREMITY Left 01/25/2023    Procedure: REMOVAL, GRAFT, ARTERIOVENOUS, UPPER EXTREMITY;  Surgeon: FARIDEH Muñoz III, MD;  [facility];  Service: Peripheral Vascular;  Laterality: Left;    REVISION OF ARTERIOVENOUS FISTULA Left 05/01/2023    Procedure: REVISION, AV FISTULA;  Surgeon: FARIDEH Muñoz III, MD;  [facility];  Service: Peripheral Vascular;  Laterality: Left;  LUE AVG revision    REVISION OF PROCEDURE INVOLVING ARTERIOVENOUS GRAFT Left 12/28/2022    Procedure: REVISION, PROCEDURE INVOLVING ARTERIOVENOUS GRAFT;  Surgeon: FARIDEH Muñoz III, MD;  [facility];  Service: Peripheral Vascular;  Laterality: Left;    REVISION OF PROCEDURE INVOLVING ARTERIOVENOUS GRAFT Left 07/21/2023    Procedure: LEFT ARM ARTERIOVENOUS GRAFT EXCISION  AND LIGATION;  Surgeon: Obi Werner MD;  Location: [facility];  Service: Vascular;  Laterality: Left;  Left AVG excision and revision with interposition     SALPINGOOPHORECTOMY Right 2016    KJB---DAVINCI    TONSILLECTOMY      TUBAL LIGATION       Home Meds:   Prior to Admission medications    Medication Sig Start Date End Date Taking? Authorizing Provider   acetaminophen (TYLENOL) 500 MG tablet Take 500 mg by mouth every 6 (six) hours as needed for Pain.    Provider, Historical   acetaminophen (TYLENOL) 500 MG tablet Take 1 tablet (500 mg total) by mouth every 4 (four) hours as needed for Pain or Temperature greater than (100.5 or greater). 1/16/24   Ricco Erickson PA-C   albuterol-ipratropium (DUO-NEB) 2.5 mg-0.5 mg/3 mL nebulizer solution Take by nebulization. 10/18/23   Provider, Historical   apixaban (ELIQUIS) 2.5 mg Tab Take 1 tablet (2.5 mg total) by mouth 2 (two) times daily. 5/15/24   Katheryn Sargent NP   aspirin (ECOTRIN) 81 MG EC tablet Take 1 tablet (81 mg total) by mouth once daily. 5/15/24   Katheryn Sargent NP   atorvastatin (LIPITOR) 40 MG tablet Take 1 tablet (40 mg total) by mouth once daily. 4/10/24   Shayne Walker MD   bisacodyL (DULCOLAX) 10 mg Supp Place 10 mg rectally. 10/18/23   Provider, Historical   cabozantinib (CABOMETYX) 60 mg Tab TAKE ONE TABLET BY MOUTH ONCE DAILY AT THE SAME TIME ON AN EMPTY STOMACH AT LEAST 1 HOUR BEFORE OR 2 HOURS AFTER EATING. AVOID GRAPEFRUIT PRODUCTS  Patient not taking: Reported on 5/23/2024 9/11/23   Liset Ngo NP   doxycycline (VIBRA-TABS) 100 MG tablet Take 1 tablet (100 mg total) by mouth 2 (two) times daily. 5/1/24   Tank Matos NP   epoetin david-epbx (RETACRIT INJ) Epoetin david - epbx (Retacrit)  Patient not taking: Reported on 5/23/2024 2/3/24 2/1/25  Provider, Historical   hyoscyamine (LEVSIN/SL) 0.125 mg Subl Place under the tongue. 10/18/23   Provider, Historical   lanthanum (FOSRENOL) 1000 MG chewable tablet Take 1 tablet by mouth. 8/25/23   Provider, Historical   levothyroxine (SYNTHROID) 75 MCG tablet Take 1 tablet (75 mcg total) by mouth once daily. 5/15/24   Katheryn Sargent, NP    lidocaine-prilocaine (EMLA) cream APPLY ATLEAST 30 MINUTES BEFORE TREATMENT 3 TIMES A WEEK 2/11/20   Charo Sharif, TONA   LORAZEPAM INTENSOL 2 mg/mL Conc Take by mouth.  Patient not taking: Reported on 5/23/2024 10/18/23   Provider, Historical   metoprolol succinate (TOPROL-XL) 25 MG 24 hr tablet Take 1 tablet (25 mg total) by mouth once daily. 1/11/24 1/10/25  Alize Ross,    mirtazapine (REMERON) 7.5 MG Tab Take 1 tablet (7.5 mg total) by mouth every evening. 5/15/24   Katheryn Sargent, NP   mv,Ca,min-folic acid-vit K1 (ONE-A-DAY WOMEN'S 50 PLUS) 400-20 mcg Tab Take 1 tablet by mouth once daily. 3/10/22   Provider, Historical   naloxone (NARCAN) 1 mg/mL injection 2 mg (1 mg per nostril) by Nasal route as needed for opioid overdose; may repeat in 3 to 5 minutes if not effective. Call 911 2/14/23   Rasta Garsia Jr., MD   nystatin (MYCOSTATIN) cream SMARTSIG:sparingly Topical 2-4 Times Daily PRN 10/23/23   Provider, Historical   ondansetron (ZOFRAN-ODT) 4 MG TbDL Take by mouth. 10/18/23   Provider, Historical   oxyCODONE (ROXICODONE) 5 MG immediate release tablet Take 1 tablet (5 mg total) by mouth every 6 (six) hours as needed for Pain.  Patient not taking: Reported on 5/23/2024 1/16/24   Ricco Erickson PA-C   pantoprazole (PROTONIX) 40 MG tablet Take 1 tablet (40 mg total) by mouth 2 (two) times daily. 11/2/23 11/1/24  Gustavo Cartagena III, MD   traMADoL (ULTRAM) 50 mg tablet Take 1 tablet (50 mg total) by mouth every 12 (twelve) hours as needed for Pain. 11/8/23   Ben Rivera MD   amLODIPine (NORVASC) 5 MG tablet TAKE 1 TABLET BY MOUTH EVERY DAY 10/14/21 6/29/22  Williams Hart MD     Anticoagulants/Antiplatelets:  Eliquis    Allergies:   Review of patient's allergies indicates:   Allergen Reactions    Carvedilol Other (See Comments)     Nausea/vomiting    Allopurinol      Other reaction(s): abnormal transaminases     Sedation History:  no adverse reactions    Review of Systems:    Hematological: no known coagulopathies  Respiratory: no cough, shortness of breath, or wheezing  Cardiovascular: no chest pain or dyspnea on exertion  Gastrointestinal: no abdominal pain, change in bowel habits, or black or bloody stools  Genito-Urinary: no dysuria, trouble voiding, or hematuria  Musculoskeletal: negative  Neurological: no TIA or stroke symptoms       OBJECTIVE:     Vital Signs (Most Recent)       Physical Exam:  ASA: II  Mallampati: II    General: no acute distress  Mental Status: alert and oriented to person, place and time  HEENT: normocephalic, atraumatic  Chest: unlabored breathing  Heart: regular heart rate  Abdomen: nondistended  Extremity: moves all extremities    Laboratory  Lab Results   Component Value Date    INR 1.1 04/13/2024       Lab Results   Component Value Date    WBC 4.30 04/20/2024    HGB 11.2 (L) 06/04/2024    HCT 39.0 04/20/2024    MCV 93 04/20/2024     (L) 04/20/2024      Lab Results   Component Value Date    GLU 73 06/07/2024     06/07/2024    K 3.8 06/07/2024     06/07/2024    CO2 25 06/07/2024    BUN 36 (H) 06/07/2024    CREATININE 4.5 (H) 06/07/2024    CALCIUM 11.2 (H) 06/07/2024    MG 2.4 04/13/2024    ALT 22 04/13/2024    AST 44 (H) 04/13/2024    ALBUMIN 3.5 04/13/2024    BILITOT 1.2 (H) 04/13/2024    BILIDIR 0.3 09/23/2015     ASSESSMENT/PLAN:     58 y.o. Rt-handed female with ESRD on HD TTS via 23-cm LIJV-approach THDC who is admitted with malfunction of the THDC with inability to aspirate requiring further evaluation.     Malfunction of 23-cm LIJV-approach THDC - Will attempt fluoroscopic-guided evaluation of an LIJV-approach THDC with local anesthetic and moderate conscious sedation.    Risks (including, but not limited to, pain, bleeding, infection, damage to nearby structures, failure to obtain sufficient material for a diagnosis, the need for additional procedures, and death), benefits, and alternatives were discussed with the patient. All  questions were answered to the best of my abilities. The patient wishes to proceed with the procedure. Written informed consent was obtained.    Thank you for considering IR for the care of your patient.     Pan Win MD  Interventional Radiology

## 2024-06-07 NOTE — Clinical Note
Left: Chest and Neck.   Scrubbed with Chlorhexidine/Alcohol.    Hair: N/A.  Skin prep dry before draping.  Prepped by: Melchor Tucker, RT 6/7/2024 10:15 AM.

## 2024-06-07 NOTE — DISCHARGE SUMMARY
Radiology Discharge Summary      Hospital Course: No complications    Admit Date: 6/7/2024  Discharge Date: 06/07/2024     Instructions Given to Patient: Yes    Diet: Resume prior diet    Activity: No driving for today    Description of Condition on Discharge: Stable    Vital Signs (Most Recent): Temp: 97.7 °F (36.5 °C) (06/07/24 0900)  Pulse: 101 (06/07/24 1058)  Resp: 15 (06/07/24 1058)  BP: 124/78 (06/07/24 1058)  SpO2: 99 % (06/07/24 1058)    Discharge Disposition: Home    Discharge Diagnosis:   58 y.o. Rt-handed female with pertinent PMHx of HTN, cardiomyopathy, systolic/diastolic CHF, PAF on Eliquis, pHTN and ESRD on HD TTS via 23-cm LIJV-approach THDC who is admitted with malfunction of the THDC with inability to aspirate requiring further evaluation.     Patient is now s/p successful fluoroscopic-guided evaluation of 23-cm LIJV-approach THDC with SVC venogram revealing hemodynamically significant Lt brachiocephalic vein SVC narrowing 2/2 fibrin sheath formation. Now s/p successful Lt brachiocephalic vein and SVC venoplasty with 12-mm balloon with no evidence of residual narrowing on post-venoplasty venogram with subsequent successful exchange for a new 23-cm LIJV-approach THDC. Patient tolerated the procedure well. No immediate post-procedural complications noted.      New THDC is ready for use immediately.     Thank you for considering IR for the care of your patient.      Pan Win MD  Interventional Radiology

## 2024-06-14 ENCOUNTER — HOSPITAL ENCOUNTER (OUTPATIENT)
Dept: RADIOLOGY | Facility: HOSPITAL | Age: 59
Discharge: HOME | End: 2024-06-14
Attending: NURSE PRACTITIONER
Payer: MEDICARE

## 2024-06-14 DIAGNOSIS — I51.89 COMBINED SYSTOLIC AND DIASTOLIC CARDIAC DYSFUNCTION: ICD-10-CM

## 2024-06-14 DIAGNOSIS — Z99.2 ESRD (END STAGE RENAL DISEASE) ON DIALYSIS: ICD-10-CM

## 2024-06-14 DIAGNOSIS — Z90.5 S/P NEPHRECTOMY: ICD-10-CM

## 2024-06-14 DIAGNOSIS — C79.89 METASTATIC RENAL CELL CARCINOMA TO INTRA-ABDOMINAL SITE: ICD-10-CM

## 2024-06-14 DIAGNOSIS — C64.9 METASTATIC RENAL CELL CARCINOMA TO INTRA-ABDOMINAL SITE: ICD-10-CM

## 2024-06-14 DIAGNOSIS — C64.2 METASTATIC RENAL CELL CARCINOMA, LEFT: ICD-10-CM

## 2024-06-14 DIAGNOSIS — N18.6 ESRD (END STAGE RENAL DISEASE) ON DIALYSIS: ICD-10-CM

## 2024-06-14 PROCEDURE — 71250 CT THORAX DX C-: CPT | Mod: TC

## 2024-06-14 PROCEDURE — 74176 CT ABD & PELVIS W/O CONTRAST: CPT | Mod: TC

## 2024-06-17 ENCOUNTER — OFFICE VISIT (OUTPATIENT)
Dept: HEMATOLOGY/ONCOLOGY | Facility: CLINIC | Age: 59
End: 2024-06-17
Payer: MEDICARE

## 2024-06-17 VITALS
OXYGEN SATURATION: 98 % | BODY MASS INDEX: 17.07 KG/M2 | HEIGHT: 64 IN | RESPIRATION RATE: 18 BRPM | TEMPERATURE: 98 F | HEART RATE: 86 BPM | SYSTOLIC BLOOD PRESSURE: 131 MMHG | WEIGHT: 100 LBS | DIASTOLIC BLOOD PRESSURE: 85 MMHG

## 2024-06-17 DIAGNOSIS — C64.9 METASTATIC RENAL CELL CARCINOMA TO INTRA-ABDOMINAL SITE: ICD-10-CM

## 2024-06-17 DIAGNOSIS — I51.89 COMBINED SYSTOLIC AND DIASTOLIC CARDIAC DYSFUNCTION: ICD-10-CM

## 2024-06-17 DIAGNOSIS — N18.6 ESRD (END STAGE RENAL DISEASE) ON DIALYSIS: ICD-10-CM

## 2024-06-17 DIAGNOSIS — S32.591A CLOSED FRACTURE OF MULTIPLE RAMI OF RIGHT PUBIS, INITIAL ENCOUNTER: ICD-10-CM

## 2024-06-17 DIAGNOSIS — C64.2 METASTATIC RENAL CELL CARCINOMA, LEFT: Primary | ICD-10-CM

## 2024-06-17 DIAGNOSIS — Z90.5 S/P NEPHRECTOMY: ICD-10-CM

## 2024-06-17 DIAGNOSIS — Z99.2 ESRD (END STAGE RENAL DISEASE) ON DIALYSIS: ICD-10-CM

## 2024-06-17 DIAGNOSIS — E83.52 HYPERCALCEMIA: ICD-10-CM

## 2024-06-17 DIAGNOSIS — C79.89 METASTATIC RENAL CELL CARCINOMA TO INTRA-ABDOMINAL SITE: ICD-10-CM

## 2024-06-17 PROCEDURE — 99215 OFFICE O/P EST HI 40 MIN: CPT | Mod: S$PBB,GW,, | Performed by: INTERNAL MEDICINE

## 2024-06-17 PROCEDURE — G2211 COMPLEX E/M VISIT ADD ON: HCPCS | Mod: S$PBB,GW,, | Performed by: INTERNAL MEDICINE

## 2024-06-17 PROCEDURE — 99215 OFFICE O/P EST HI 40 MIN: CPT | Mod: PBBFAC | Performed by: INTERNAL MEDICINE

## 2024-06-17 PROCEDURE — 99999 PR PBB SHADOW E&M-EST. PATIENT-LVL V: CPT | Mod: PBBFAC,,, | Performed by: INTERNAL MEDICINE

## 2024-06-24 ENCOUNTER — HOSPITAL ENCOUNTER (OUTPATIENT)
Dept: WOUND CARE | Facility: HOSPITAL | Age: 59
Discharge: HOME | End: 2024-06-24
Attending: INTERNAL MEDICINE
Payer: MEDICARE

## 2024-06-24 VITALS
DIASTOLIC BLOOD PRESSURE: 79 MMHG | SYSTOLIC BLOOD PRESSURE: 122 MMHG | HEART RATE: 80 BPM | TEMPERATURE: 98 F | RESPIRATION RATE: 14 BRPM

## 2024-06-24 DIAGNOSIS — Z99.2 ESRD (END STAGE RENAL DISEASE) ON DIALYSIS: ICD-10-CM

## 2024-06-24 DIAGNOSIS — N18.6 ESRD (END STAGE RENAL DISEASE) ON DIALYSIS: ICD-10-CM

## 2024-06-24 DIAGNOSIS — L97.429: Primary | ICD-10-CM

## 2024-06-24 DIAGNOSIS — I73.9 PAD (PERIPHERAL ARTERY DISEASE): ICD-10-CM

## 2024-06-24 DIAGNOSIS — C64.9 RENAL CELL CARCINOMA, UNSPECIFIED LATERALITY: ICD-10-CM

## 2024-06-24 PROCEDURE — 11042 DBRDMT SUBQ TIS 1ST 20SQCM/<: CPT

## 2024-06-24 PROCEDURE — 99213 OFFICE O/P EST LOW 20 MIN: CPT

## 2024-06-24 NOTE — PROGRESS NOTES
[facility]                Progress Note    Subjective:       Patient ID: Eva Bloom is a 58 y.o. female.    Chief Complaint: Dressing Change, Wound Care, and Wound Check    Follow up wound care visit. Patient brought to exam room in wheelchair, by . Patient is wearing Darco to the Right and Left Foot. Patient was able to ambulate from the wheelchair to the exam chair with  at side. Pt denies fever, chills, nausea/vomiting, or flu-like symptoms at present; c/o pain to wound beds to left foot at present. Dressing to the Right Lateral Thigh is intact, with no strike though drainage.  is the caretaker of his wife and he is up to date on all her care as well as her appointments.  reports Home Health came out as order. Dressing removed and wounds cleansed by RN. Changes to drsg order; applied per MD order. Pt  denied AVS.    Patient presents with  for follow up chronic left foot and right thigh wounds. Last seen in wound care clinic in March 2024. She had complications with her avg with infection of her dialysis catheter (s/p line change one week). She has seen oncology and given her chronic debility and overall stable cancer no further treatment is recommended. Home health has been changing foam dressnig to right thigh once per week.  has been painting right heel and toe wounds with betadine daily there has been no drainage from any of the wounds she is tolerating full times on hemodialysis w/o hypotension. She is scheduled for repeat arterial ultrasound and vascular surgery follow up. She is compliant with apixiban and asa therapy      Review of Systems      Objective:        Physical Exam  Constitutional:       Appearance: She is ill-appearing.      Comments: cachectic   Eyes:      General: No scleral icterus.  Cardiovascular:      Comments: Dp and pt non palpable bilaterally  Musculoskeletal:      Right lower leg: No  edema.      Left lower leg: No edema.   Skin:     Comments: Right lateral thigh, fibrin non selectively removed with sterile qtip to show pink base no tunnneling no induration no expressible discharge    Left heel with stable firm eschar no fluctance or induration    Left third toe firm eschar without fluctuance no induration          Vitals:    06/24/24 0858   BP: 122/79   Pulse: 80   Resp: 14   Temp: 97.7 °F (36.5 °C)       Assessment:           ICD-10-CM ICD-9-CM   1. Ischemic ulcer of heel, left, with unspecified severity  L97.429 707.14   2. PAD (peripheral artery disease)  I73.9 443.9   3. ESRD (end stage renal disease) on dialysis  N18.6 585.6    Z99.2 V45.11   4. Renal cell carcinoma, unspecified laterality  C64.9 189.0            (Retired) Altered Skin Integrity 11/01/23 2027 Right lateral Thigh Ulceration (Active)   11/01/23 2027   Altered Skin Integrity Present on Admission - Did Patient arrive to the hospital with altered skin?: yes   Side: Right   Orientation: lateral   Location: Thigh   Wound Number:    Is this injury device related?:    Primary Wound Type: Ulceration   Description of Altered Skin Integrity:    Ankle-Brachial Index:    Pulses:    Removal Indication and Assessment: removed per policy   Wound Outcome:    (Retired) Wound Length (cm):    (Retired) Wound Width (cm):    (Retired) Depth (cm):    Wound Description (Comments):    Removal Indications:    Wound Image    06/24/24 0901   Dressing Appearance Dry;Intact;Clean 06/24/24 0901   Drainage Amount None 06/24/24 0901   Appearance Dry;Fibrin 06/24/24 0901   Red (%), Wound Tissue Color 100 % 06/24/24 0901   Periwound Area Dry;Intact 06/24/24 0901   Wound Edges Defined 06/24/24 0901   Wound Length (cm) 0.2 cm 06/24/24 0901   Wound Width (cm) 0.3 cm 06/24/24 0901   Wound Depth (cm) 0.2 cm 06/24/24 0901   Wound Volume (cm^3) 0.012 cm^3 06/24/24 0901   Wound Surface Area (cm^2) 0.06 cm^2 06/24/24 0901   Care Cleansed with:;Soap and water 06/24/24  0901   Dressing Applied;Island/border;Other (comment) 06/24/24 0901   Periwound Care Dry periwound area maintained 06/24/24 0901   Off Loading Off loading shoe 06/24/24 0901   Dressing Change Due 06/25/24 06/24/24 0901            Altered Skin Integrity 02/22/24 0930 Left dorsal Toe, third Ulceration (Active)   02/22/24 0930   Altered Skin Integrity Present on Admission - Did Patient arrive to the hospital with altered skin?: yes   Side: Left   Orientation: dorsal   Location: Toe, third   Wound Number:    Is this injury device related?:    Primary Wound Type: Ulceration   Description of Altered Skin Integrity:    Ankle-Brachial Index:    Pulses:    Removal Indication and Assessment:    Wound Outcome:    (Retired) Wound Length (cm):    (Retired) Wound Width (cm):    (Retired) Depth (cm):    Wound Description (Comments):    Removal Indications:    Wound Image   06/24/24 0904   Description of Altered Skin Integrity Full thickness tissue loss. Base is covered by slough and/or eschar in the wound bed 06/24/24 0904   Dressing Appearance Open to air 06/24/24 0904   Drainage Amount None 06/24/24 0904   Appearance Intact;Yellow;Eschar;Dry 06/24/24 0904   Black (%), Wound Tissue Color 50 % 06/24/24 0904   Yellow (%), Wound Tissue Color 50 % 06/24/24 0904   Periwound Area Intact;Dry 06/24/24 0904   Wound Length (cm) 1.4 cm 06/24/24 0904   Wound Width (cm) 1.4 cm 06/24/24 0904   Wound Depth (cm) 0 cm 06/24/24 0904   Wound Volume (cm^3) 0 cm^3 06/24/24 0904   Wound Surface Area (cm^2) 1.96 cm^2 06/24/24 0904   Care Cleansed with:;Soap and water 06/24/24 0904   Dressing Applied;Other (comment) 06/24/24 0904   Off Loading Off loading shoe 06/24/24 0904   Dressing Change Due 06/25/24 06/24/24 0904            Altered Skin Integrity 02/22/24 0930 Left plantar Heel Ulceration (Active)   02/22/24 0930   Altered Skin Integrity Present on Admission - Did Patient arrive to the hospital with altered skin?:    Side: Left   Orientation:  plantar   Location: Heel   Wound Number:    Is this injury device related?:    Primary Wound Type: Ulceration   Description of Altered Skin Integrity:    Ankle-Brachial Index:    Pulses:    Removal Indication and Assessment:    Wound Outcome:    (Retired) Wound Length (cm):    (Retired) Wound Width (cm):    (Retired) Depth (cm):    Wound Description (Comments):    Removal Indications:    Wound Image   06/24/24 0905   Description of Altered Skin Integrity Full thickness tissue loss. Base is covered by slough and/or eschar in the wound bed 06/24/24 0905   Dressing Appearance Open to air 06/24/24 0905   Drainage Amount None 06/24/24 0905   Appearance Yellow;Eschar;Dry 06/24/24 0905   Black (%), Wound Tissue Color 90 % 06/24/24 0905   Yellow (%), Wound Tissue Color 10 % 06/24/24 0905   Periwound Area Intact;Dry 06/24/24 0905   Wound Length (cm) 3.4 cm 06/24/24 0905   Wound Width (cm) 3.6 cm 06/24/24 0905   Wound Depth (cm) 0 cm 06/24/24 0905   Wound Volume (cm^3) 0 cm^3 06/24/24 0905   Wound Surface Area (cm^2) 12.24 cm^2 06/24/24 0905   Care Cleansed with:;Soap and water 06/24/24 0905   Dressing Applied;Other (comment) 06/24/24 0905   Off Loading Off loading shoe 06/24/24 0905   Dressing Change Due 06/25/24 06/24/24 0905           Plan:          Significant proximal left lower extremity stenosis based on past arterial ultrasound no sharp debridement needs improved arterial flow for wound healing expectant management with topical betadine daily keep follow up with vascular surgery as scehduled stressed importance to patient's  to float heels (supportive pillows behind calves) when in bed. Home health weekly change foam border to right thigh return for woudn check in three weeks after vascular surgery evaluation    Tissue pathology and/or culture taken     [] Yes      [x] No  Sharp debridement performed                   [x] Yes       [] No  Labs ordered     [] Yes       [x] No  Imaging ordered    [] Yes      [x]  No    Orders Placed This Encounter   Procedures    Change dressing     Wound care orders    For right lateral thigh wound:     Dressing change frequency twice a week   Remove old dressing.   Cleanse or irrigate with: Normal Saline   Protect periwound with:  periwound: Cavilon   Primary dressing - Paint with betadine   Secondary dressing: Bordered Foam     For Left third toe and Left heel.   Patient is using betadine to treat wounds on foot, daily. Please provide supplies for daily changes.    SUBSEQUENT HOME HEALTH ORDERS     Home Health Orders ( Patient was seen at [facility] on 06/24/2024. Next appointment at [facility] is 07/22/2024).     Wound Dressing Orders     For right lateral thigh wound:     Dressing change frequency twice a week   Remove old dressing.   Cleanse or irrigate with: Normal Saline   Protect periwound with:  periwound: Cavilon   Primary dressing - Paint with betadine   Secondary dressing: Bordered Foam     For Left third toe and Left heel.   Patient is using betadine to treat wounds on foot, daily. Please provide supplies for daily changes.     Order Specific Question:   What Home Health Agency is the patient currently using?     Answer:   [facility]        Follow up in about 3 weeks (around 7/15/2024) for Wound Care.

## 2024-07-03 DIAGNOSIS — Z71.89 COMPLEX CARE COORDINATION: ICD-10-CM

## 2024-07-05 ENCOUNTER — HOSPITAL ENCOUNTER (OUTPATIENT)
Dept: CARDIOLOGY | Facility: HOSPITAL | Age: 59
Discharge: HOME | End: 2024-07-05
Attending: INTERNAL MEDICINE
Payer: MEDICARE

## 2024-07-05 VITALS — BODY MASS INDEX: 17.07 KG/M2 | WEIGHT: 100 LBS | HEIGHT: 64 IN

## 2024-07-05 DIAGNOSIS — T82.590D MALFUNCTION OF ARTERIOVENOUS DIALYSIS FISTULA, SUBSEQUENT ENCOUNTER: ICD-10-CM

## 2024-07-05 DIAGNOSIS — I42.8 CARDIOMYOPATHY, NONISCHEMIC: Chronic | ICD-10-CM

## 2024-07-05 DIAGNOSIS — N18.6 ESRD (END STAGE RENAL DISEASE) ON DIALYSIS: ICD-10-CM

## 2024-07-05 DIAGNOSIS — Z95.810 ICD (IMPLANTABLE CARDIOVERTER-DEFIBRILLATOR) IN PLACE: ICD-10-CM

## 2024-07-05 DIAGNOSIS — I48.0 PAF (PAROXYSMAL ATRIAL FIBRILLATION): Chronic | ICD-10-CM

## 2024-07-05 DIAGNOSIS — C64.9 METASTATIC RENAL CELL CARCINOMA TO INTRA-ABDOMINAL SITE: ICD-10-CM

## 2024-07-05 DIAGNOSIS — I51.89 COMBINED SYSTOLIC AND DIASTOLIC CARDIAC DYSFUNCTION: ICD-10-CM

## 2024-07-05 DIAGNOSIS — I70.0 AORTIC ATHEROSCLEROSIS: ICD-10-CM

## 2024-07-05 DIAGNOSIS — I10 ESSENTIAL HYPERTENSION: Chronic | ICD-10-CM

## 2024-07-05 DIAGNOSIS — D62 ACUTE BLOOD LOSS ANEMIA: ICD-10-CM

## 2024-07-05 DIAGNOSIS — Z99.2 ESRD (END STAGE RENAL DISEASE) ON DIALYSIS: ICD-10-CM

## 2024-07-05 DIAGNOSIS — I50.42 CHRONIC COMBINED SYSTOLIC AND DIASTOLIC CONGESTIVE HEART FAILURE: ICD-10-CM

## 2024-07-05 DIAGNOSIS — I70.262 CRITICAL LIMB ISCHEMIA OF LEFT LOWER EXTREMITY WITH GANGRENE: ICD-10-CM

## 2024-07-05 DIAGNOSIS — I73.9 PAD (PERIPHERAL ARTERY DISEASE): ICD-10-CM

## 2024-07-05 DIAGNOSIS — I34.0 NONRHEUMATIC MITRAL VALVE REGURGITATION: ICD-10-CM

## 2024-07-05 DIAGNOSIS — C79.89 METASTATIC RENAL CELL CARCINOMA TO INTRA-ABDOMINAL SITE: ICD-10-CM

## 2024-07-05 LAB
ASCENDING AORTA: 2.54 CM
AV INDEX (PROSTH): 0.31
AV MEAN GRADIENT: 12 MMHG
AV PEAK GRADIENT: 22 MMHG
AV REGURGITATION PRESSURE HALF TIME: 548.79 MS
AV VALVE AREA BY VELOCITY RATIO: 0.87 CM²
AV VALVE AREA: 0.86 CM²
AV VELOCITY RATIO: 0.32
BSA FOR ECHO PROCEDURE: 1.43 M2
CV ECHO LV RWT: 0.57 CM
DOP CALC AO PEAK VEL: 2.34 M/S
DOP CALC AO VTI: 36.2 CM
DOP CALC LVOT AREA: 2.7 CM2
DOP CALC LVOT DIAMETER: 1.87 CM
DOP CALC LVOT PEAK VEL: 0.74 M/S
DOP CALC LVOT STROKE VOLUME: 31.02 CM3
DOP CALC MV VTI: 30.3 CM
DOP CALCLVOT PEAK VEL VTI: 11.3 CM
E WAVE DECELERATION TIME: 198.75 MSEC
E/A RATIO: 0.94
E/E' RATIO: 15.14 M/S
ECHO LV POSTERIOR WALL: 1.48 CM (ref 0.6–1.1)
FRACTIONAL SHORTENING: 8 % (ref 28–44)
HR MV ECHO: 80 BPM
INTERVENTRICULAR SEPTUM: 1.51 CM (ref 0.6–1.1)
IVC DIAMETER: 2.39 CM
LA MAJOR: 5.19 CM
LA MINOR: 4.96 CM
LA WIDTH: 4.3 CM
LEFT ATRIUM SIZE: 4.23 CM
LEFT ATRIUM VOLUME INDEX: 53.7 ML/M2
LEFT ATRIUM VOLUME: 78.42 CM3
LEFT INTERNAL DIMENSION IN SYSTOLE: 4.76 CM (ref 2.1–4)
LEFT VENTRICLE DIASTOLIC VOLUME INDEX: 87.05 ML/M2
LEFT VENTRICLE DIASTOLIC VOLUME: 127.09 ML
LEFT VENTRICLE MASS INDEX: 231 G/M2
LEFT VENTRICLE SYSTOLIC VOLUME INDEX: 72.1 ML/M2
LEFT VENTRICLE SYSTOLIC VOLUME: 105.24 ML
LEFT VENTRICULAR INTERNAL DIMENSION IN DIASTOLE: 5.16 CM (ref 3.5–6)
LEFT VENTRICULAR MASS: 336.69 G
LV LATERAL E/E' RATIO: 13.25 M/S
LV SEPTAL E/E' RATIO: 17.67 M/S
LVED V (TEICH): 127.09 ML
LVES V (TEICH): 105.24 ML
LVOT MG: 1.12 MMHG
LVOT MV: 0.49 CM/S
MV MEAN GRADIENT: 3 MMHG
MV PEAK A VEL: 1.13 M/S
MV PEAK E VEL: 1.06 M/S
MV PEAK GRADIENT: 6 MMHG
MV STENOSIS PRESSURE HALF TIME: 57.64 MS
MV VALVE AREA BY CONTINUITY EQUATION: 1.02 CM2
MV VALVE AREA P 1/2 METHOD: 3.82 CM2
OHS CV RV/LV RATIO: 0.89 CM
PISA AR MAX VEL: 2.75 M/S
PISA TR MAX VEL: 3.49 M/S
PV PEAK GRADIENT: 6 MMHG
PV PEAK VELOCITY: 1.18 M/S
RA MAJOR: 5.8 CM
RA PRESSURE ESTIMATED: 15 MMHG
RA WIDTH: 6 CM
RIGHT VENTRICLE DIASTOLIC BASEL DIMENSION: 4.6 CM
RIGHT VENTRICULAR END-DIASTOLIC DIMENSION: 4.6 CM
RV TB RVSP: 18 MMHG
RV TISSUE DOPPLER FREE WALL SYSTOLIC VELOCITY 1 (APICAL 4 CHAMBER VIEW): 15.55 CM/S
SINUS: 3.56 CM
STJ: 2.79 CM
TDI LATERAL: 0.08 M/S
TDI SEPTAL: 0.06 M/S
TDI: 0.07 M/S
TR MAX PG: 49 MMHG
TRICUSPID ANNULAR PLANE SYSTOLIC EXCURSION: 2.36 CM
TV REST PULMONARY ARTERY PRESSURE: 64 MMHG
Z-SCORE OF LEFT VENTRICULAR DIMENSION IN END DIASTOLE: 1.61
Z-SCORE OF LEFT VENTRICULAR DIMENSION IN END SYSTOLE: 4.4

## 2024-07-05 PROCEDURE — 93306 TTE W/DOPPLER COMPLETE: CPT | Mod: 26,GW,, | Performed by: INTERNAL MEDICINE

## 2024-07-05 PROCEDURE — 93306 TTE W/DOPPLER COMPLETE: CPT

## 2024-07-08 ENCOUNTER — OFFICE VISIT (OUTPATIENT)
Dept: VASCULAR SURGERY | Facility: CLINIC | Age: 59
End: 2024-07-08
Payer: MEDICARE

## 2024-07-08 VITALS
WEIGHT: 96 LBS | BODY MASS INDEX: 16.39 KG/M2 | SYSTOLIC BLOOD PRESSURE: 130 MMHG | HEIGHT: 64 IN | HEART RATE: 80 BPM | DIASTOLIC BLOOD PRESSURE: 80 MMHG

## 2024-07-08 DIAGNOSIS — L97.429 HEEL ULCER, LEFT, WITH UNSPECIFIED SEVERITY: ICD-10-CM

## 2024-07-08 DIAGNOSIS — Z99.2 ESRD (END STAGE RENAL DISEASE) ON DIALYSIS: Primary | ICD-10-CM

## 2024-07-08 DIAGNOSIS — C64.9 METASTATIC RENAL CELL CARCINOMA TO LUNG, UNSPECIFIED LATERALITY: ICD-10-CM

## 2024-07-08 DIAGNOSIS — N18.6 ESRD (END STAGE RENAL DISEASE) ON DIALYSIS: Primary | ICD-10-CM

## 2024-07-08 DIAGNOSIS — L97.529 ISCHEMIC TOE ULCER, LEFT, WITH UNSPECIFIED SEVERITY: ICD-10-CM

## 2024-07-08 DIAGNOSIS — I73.9 PAD (PERIPHERAL ARTERY DISEASE): Primary | ICD-10-CM

## 2024-07-08 DIAGNOSIS — C78.00 METASTATIC RENAL CELL CARCINOMA TO LUNG, UNSPECIFIED LATERALITY: ICD-10-CM

## 2024-07-08 PROCEDURE — 99214 OFFICE O/P EST MOD 30 MIN: CPT | Mod: PBBFAC | Performed by: SURGERY

## 2024-07-08 PROCEDURE — 99999 PR PBB SHADOW E&M-EST. PATIENT-LVL IV: CPT | Mod: PBBFAC,,, | Performed by: SURGERY

## 2024-07-08 NOTE — PROGRESS NOTES
VASCULAR SURGERY SERVICE    CHIEF COMPLAINT:  Functional left AV graft dysfunctional left AV graft    HISTORY OF PRESENT ILLNESS: Eva Bloom is a 58 y.o. female end-stage renal disease, with multiple serious medical comorbidities including admission for sepsis 2 months ago, EF 20%, AFib on Eliquis, who is having increasing bleeding after her dialysis runs.  I placed a revision, left AV graft with long interposition Accu Seal 12/28/2022.  She has had uninterrupted use of this graft ever since.    Recent weeks she is had increased bleeding after dialysis sticks    S/p:    1aaa.  Excision, left AV graft for infection 9/27/2023 (Perez) (07/21/2023, Dr. Werner)  1aa. Reclosure, L AVG conter-incision 5/1/2023  1a. PTA, L AVG 4/19/2023  Excision, excluded L AVG and permacath 1/25/2023  revision, left AV graft with long interposition Accu Seal 12/28/2022  Original left AV graft 2017 with subsequent covered stent placement in venous outflow    09/1/2023:  She now returns after excision of the left AV graft for infection proximally 5 weeks ago.  She is been dialyzing through a PermCath.    09/22/2023:  Now returns with breakdown of prior counter incision for graft excision.  Notably, she has metastatic renal cell carcinoma to the lungs    10/20/2023:  She now returns after excision of the left AV graft remnant.  She is continued to deteriorate with weight loss, now BMI 13, was recently hospitalized for her declining state, has now been put on hospice.  This point she is still dialyzing through a PermCath    2/2024:  referred by Dr. Farooq for L 3rd toe wound.  Pt has resumed Oncologic care after stopping hospice care and desires to proceed with ulcer healing treatment.    4/2024:  +wound care with healing wounds.    7/2024:  stronger, wounds improving, HD via catheter without issues.    Past Medical History:   Diagnosis Date    Anemia     Anticoagulant long-term use     Atrial fibrillation     Bronchitis  03/01/2017    Cancer 2016    kidney cancer    CHF (congestive heart failure), NYHA class II, chronic, systolic     CMV (cytomegalovirus) antibody positive     CVA (cerebral vascular accident) 1/3/2024    Encounter for blood transfusion     ESRD (end stage renal disease)     Essential hypertension 09/23/2015    H/O herpes simplex type 2 infection     Herpes simplex type 1 antibody positive     History of kidney cancer     s/p left nephrectomy 1/2016    Hyperparathyroidism, unspecified     Hyperuricemia without signs of inflammatory arthritis and tophaceous disease     Kidney stones     LGSIL (low grade squamous intraepithelial dysplasia)     Myocardiopathy 07/21/2017    Prediabetes     Proteinuria     Renal disorder     Thyroid nodule     Urate nephropathy        Past Surgical History:   Procedure Laterality Date    BREAST CYST EXCISION      COLONOSCOPY N/A 11/12/2015    COLONOSCOPY N/A 03/12/2021    Procedure: COLONOSCOPY;  Surgeon: Brendon Lanier MD;  Location: [facility] ENDO;  Service: Endoscopy;  Laterality: N/A;  covid test 3/9, labs prior, prep instr mailed -ml    EXCISION OF ARTERIOVENOUS FISTULA Left 09/27/2023    Procedure: EXCISION, AV FISTULA;  Surgeon: FARIDEH Muñoz III, MD;  [facility];  Service: Vascular;  Laterality: Left;  LUE AV graft excision    INSERTION OF BIVENTRICULAR IMPLANTABLE CARDIOVERTER-DEFIBRILLATOR (ICD)  04/2021    INSERTION OF TUNNELED CENTRAL VENOUS CATHETER (CVC) WITH SUBCUTANEOUS PORT N/A 01/25/2023    Procedure: INSERTION, DUAL LUMEN CATHETER WITH PORT, WITH IMAGING GUIDANCE;  Surgeon: FARIDEH Muñoz III, MD;  [facility];  Service: Peripheral Vascular;  Laterality: N/A;  possinble permcath placment    NEPHRECTOMY-LAPAROSCOPIC Left 01/12/2016    PERCUTANEOUS TRANSLUMINAL ANGIOPLASTY OF ARTERIOVENOUS FISTULA Left 04/19/2023    Procedure: PTA, AV FISTULA;  Surgeon: FARIDEH Muñoz III, MD;  Location: [facility] CATH LAB;  Service: Peripheral Vascular;   Laterality: Left;    PERITONEAL CATHETER INSERTION      Permacath insertion  01/12/2017    PLACEMENT OF ARTERIOVENOUS GRAFT  12/28/2022    Procedure: INSERTION, GRAFT, ARTERIOVENOUS;  Surgeon: FARIDEH Muñoz III, MD;  Location: [facility] OR 2ND FLR;  Service: Peripheral Vascular;;    REMOVAL, GRAFT, ARTERIOVENOUS, UPPER EXTREMITY Left 01/25/2023    Procedure: REMOVAL, GRAFT, ARTERIOVENOUS, UPPER EXTREMITY;  Surgeon: FARIDEH Muñoz III, MD;  Location: [facility] OR 2ND FLR;  Service: Peripheral Vascular;  Laterality: Left;    REVISION OF ARTERIOVENOUS FISTULA Left 05/01/2023    Procedure: REVISION, AV FISTULA;  Surgeon: FARIDEH Muñoz III, MD;  Location: [facility] OR 2ND FLR;  Service: Peripheral Vascular;  Laterality: Left;  LUE AVG revision    REVISION OF PROCEDURE INVOLVING ARTERIOVENOUS GRAFT Left 12/28/2022    Procedure: REVISION, PROCEDURE INVOLVING ARTERIOVENOUS GRAFT;  Surgeon: FARIDEH Muñoz III, MD;  Location: [facility] OR 2ND FLR;  Service: Peripheral Vascular;  Laterality: Left;    REVISION OF PROCEDURE INVOLVING ARTERIOVENOUS GRAFT Left 07/21/2023    Procedure: LEFT ARM ARTERIOVENOUS GRAFT EXCISION  AND LIGATION;  Surgeon: Obi Werner MD;  Location: [facility] OR;  Service: Vascular;  Laterality: Left;  Left AVG excision and revision with interposition    SALPINGOOPHORECTOMY Right 2016    KJB---DAVINCI    TONSILLECTOMY      TUBAL LIGATION           Current Outpatient Medications:     acetaminophen (TYLENOL) 500 MG tablet, Take 500 mg by mouth every 6 (six) hours as needed for Pain., Disp: , Rfl:     acetaminophen (TYLENOL) 500 MG tablet, Take 1 tablet (500 mg total) by mouth every 4 (four) hours as needed for Pain or Temperature greater than (100.5 or greater)., Disp: 30 tablet, Rfl: 0    albuterol-ipratropium (DUO-NEB) 2.5 mg-0.5 mg/3 mL nebulizer solution, Take by nebulization., Disp: , Rfl:     apixaban (ELIQUIS) 2.5 mg Tab, Take 1 tablet (2.5 mg total) by mouth 2 (two) times daily., Disp: 60 tablet, Rfl: 5     aspirin (ECOTRIN) 81 MG EC tablet, Take 1 tablet (81 mg total) by mouth once daily., Disp: 90 tablet, Rfl: 2    atorvastatin (LIPITOR) 40 MG tablet, Take 1 tablet (40 mg total) by mouth once daily., Disp: 90 tablet, Rfl: 3    bisacodyL (DULCOLAX) 10 mg Supp, Place 10 mg rectally., Disp: , Rfl:     doxycycline (VIBRA-TABS) 100 MG tablet, Take 1 tablet (100 mg total) by mouth 2 (two) times daily., Disp: 14 tablet, Rfl: 0    hyoscyamine (LEVSIN/SL) 0.125 mg Subl, Place under the tongue., Disp: , Rfl:     lanthanum (FOSRENOL) 1000 MG chewable tablet, Take 1 tablet by mouth., Disp: , Rfl:     levothyroxine (SYNTHROID) 75 MCG tablet, Take 1 tablet (75 mcg total) by mouth once daily., Disp: 90 tablet, Rfl: 2    lidocaine-prilocaine (EMLA) cream, APPLY ATLEAST 30 MINUTES BEFORE TREATMENT 3 TIMES A WEEK, Disp: 30 g, Rfl: 11    LORAZEPAM INTENSOL 2 mg/mL Conc, Take by mouth., Disp: , Rfl:     metoprolol succinate (TOPROL-XL) 25 MG 24 hr tablet, Take 1 tablet (25 mg total) by mouth once daily., Disp: 30 tablet, Rfl: 11    mirtazapine (REMERON) 7.5 MG Tab, Take 1 tablet (7.5 mg total) by mouth every evening., Disp: 90 tablet, Rfl: 2    mv,Ca,min-folic acid-vit K1 (ONE-A-DAY WOMEN'S 50 PLUS) 400-20 mcg Tab, Take 1 tablet by mouth once daily., Disp: , Rfl:     naloxone (NARCAN) 1 mg/mL injection, 2 mg (1 mg per nostril) by Nasal route as needed for opioid overdose; may repeat in 3 to 5 minutes if not effective. Call 911, Disp: 2 mL, Rfl: 11    nystatin (MYCOSTATIN) cream, SMARTSIG:sparingly Topical 2-4 Times Daily PRN, Disp: , Rfl:     ondansetron (ZOFRAN-ODT) 4 MG TbDL, Take by mouth., Disp: , Rfl:     oxyCODONE (ROXICODONE) 5 MG immediate release tablet, Take 1 tablet (5 mg total) by mouth every 6 (six) hours as needed for Pain., Disp: 11 tablet, Rfl: 0    pantoprazole (PROTONIX) 40 MG tablet, Take 1 tablet (40 mg total) by mouth 2 (two) times daily., Disp: 60 tablet, Rfl: 11    traMADoL (ULTRAM) 50 mg tablet, Take 1 tablet  (50 mg total) by mouth every 12 (twelve) hours as needed for Pain., Disp: 30 each, Rfl: 1    cabozantinib (CABOMETYX) 60 mg Tab, TAKE ONE TABLET BY MOUTH ONCE DAILY AT THE SAME TIME ON AN EMPTY STOMACH AT LEAST 1 HOUR BEFORE OR 2 HOURS AFTER EATING. AVOID GRAPEFRUIT PRODUCTS (Patient not taking: Reported on 7/8/2024), Disp: 30 tablet, Rfl: 4    epoetin david-epbx (RETACRIT INJ), Epoetin david - epbx (Retacrit) (Patient not taking: Reported on 5/23/2024), Disp: , Rfl:   No current facility-administered medications for this visit.    Facility-Administered Medications Ordered in Other Visits:     0.9%  NaCl infusion, , Intravenous, Continuous, Soni Bhatti MD, New Bag at 07/21/23 1116    Review of patient's allergies indicates:   Allergen Reactions    Carvedilol Other (See Comments)     Nausea/vomiting    Allopurinol      Other reaction(s): abnormal transaminases       Family History   Problem Relation Name Age of Onset    Hypertension Mother      Diabetes Father      Kidney disease Father      No Known Problems Sister Sophy     Heart disease Sister Maria A     No Known Problems Sister Claudine     No Known Problems Brother Dax     No Known Problems Brother Don     Cataracts Maternal Aunt      No Known Problems Maternal Uncle      No Known Problems Paternal Aunt      No Known Problems Paternal Uncle      No Known Problems Maternal Grandmother      Diabetes Maternal Grandfather      No Known Problems Paternal Grandmother      No Known Problems Paternal Grandfather      No Known Problems Son      No Known Problems Son      No Known Problems Other      Breast cancer Neg Hx      Colon cancer Neg Hx      Cancer Neg Hx      Stroke Neg Hx      Amblyopia Neg Hx      Blindness Neg Hx      Glaucoma Neg Hx      Macular degeneration Neg Hx      Retinal detachment Neg Hx      Strabismus Neg Hx      Thyroid disease Neg Hx         Social History     Tobacco Use    Smoking status: Never     Passive exposure: Never    Smokeless tobacco:  "Never   Substance Use Topics    Alcohol use: No    Drug use: Yes     Types: Hydrocodone       PHYSICAL EXAM:   /80 (BP Location: Right arm, Patient Position: Sitting)   Pulse 80   Ht 5' 4" (1.626 m)   Wt 43.5 kg (96 lb)   LMP 10/24/2016   BMI 16.48 kg/m²   Constitutional:  She appears very frail and deconditioned   Neurological: Normal speech  no focal findings  CN II - XII grossly intact.    Psychiatric: Mood and affect appropriate and symmetric.   HEENT: Normocephalic / atraumatic  PERRLA  Midline trachea  No scars across the neck   Cardiac: Regular rate and rhythm.   Pulmonary: Normal pulmonary effort.   Abdomen: Soft, not distended.     Skin: Warm and well perfused.    Vascular:  Radial pulses are nonpalpable bilaterally.   Extremities/  Musculoskeletal: No edema.   Right arm inc well healed, L 2-3 toes with edema and dry ischemic changes       IMAGIN/2024  Right lower extremity pressures and waveforms indicate moderate to severe arterial occlusive disease.  Left lower extremity pressures and waveforms indicate moderate to severe arterial occlusive disease.  Ankle pressures are non-compressible indicating possible medial calcinosis.     Left lower extremity arterial ultrasound shows decreased external iliac artery flow indicating proximal stenosis with decreased distal femoropopliteal artery flow, heavily calcified arteries, an occluded PT and peroneal arteries.     IMPRESSION:   Status post excision, left AV graft remnant.    Patient's medical status continued to deteriorate and now off home hospice  AFib on Eliquis  Severe systolic dysfunction EF 20%  Metastatic renal cell to lung    PLAN  -NUVIA / TP show moderate to severe arterial occlusive disease s/p L AT/PT PTA 2023  -Wound care recs  -F/u 2-3 mo after Onc prognosis / plan appt     I spent 11 minutes evaluating this patient and greater than 50% of the time was spent counseling, coordinator care and discussing the plan of care.  All " questions were answered and patient stated understanding with agreement with the above treatment plan.    Corby Casey M.D., R.P.[facility]

## 2024-07-12 ENCOUNTER — HOSPITAL ENCOUNTER (OUTPATIENT)
Dept: RADIOLOGY | Facility: HOSPITAL | Age: 59
Discharge: HOME | End: 2024-07-12
Attending: PHYSICIAN ASSISTANT
Payer: MEDICARE

## 2024-07-12 ENCOUNTER — TELEPHONE (OUTPATIENT)
Dept: CARDIOLOGY | Facility: HOSPITAL | Age: 59
End: 2024-07-12
Payer: MEDICARE

## 2024-07-12 ENCOUNTER — OFFICE VISIT (OUTPATIENT)
Dept: ORTHOPEDICS | Facility: CLINIC | Age: 59
End: 2024-07-12
Payer: MEDICARE

## 2024-07-12 VITALS
DIASTOLIC BLOOD PRESSURE: 81 MMHG | TEMPERATURE: 98 F | SYSTOLIC BLOOD PRESSURE: 128 MMHG | WEIGHT: 96 LBS | BODY MASS INDEX: 16.39 KG/M2 | HEIGHT: 64 IN | RESPIRATION RATE: 18 BRPM | HEART RATE: 80 BPM

## 2024-07-12 DIAGNOSIS — I42.8 CARDIOMYOPATHY, NONISCHEMIC: Primary | ICD-10-CM

## 2024-07-12 DIAGNOSIS — S32.591A CLOSED FRACTURE OF MULTIPLE RAMI OF RIGHT PUBIS, INITIAL ENCOUNTER: ICD-10-CM

## 2024-07-12 PROCEDURE — 99999 PR PBB SHADOW E&M-EST. PATIENT-LVL V: CPT | Mod: PBBFAC,,, | Performed by: PHYSICIAN ASSISTANT

## 2024-07-12 PROCEDURE — 99215 OFFICE O/P EST HI 40 MIN: CPT | Mod: PBBFAC,25 | Performed by: PHYSICIAN ASSISTANT

## 2024-07-12 PROCEDURE — 99213 OFFICE O/P EST LOW 20 MIN: CPT | Mod: S$PBB,GW,, | Performed by: PHYSICIAN ASSISTANT

## 2024-07-12 PROCEDURE — 72190 X-RAY EXAM OF PELVIS: CPT | Mod: TC

## 2024-07-12 NOTE — TELEPHONE ENCOUNTER
"Spoke with patient's  on 7/10/24 regarding [facility]HM disconnection.  states that pt's device was "deactivated" during a hospital visit 7/2023. States the device is "non-functioning" currently. Unable to locate notes from that visit stating the device is off but in clinic device check on 9/1/2023 notes device function WNL and new remote home monitor ordered.    Pt is overdue for annual provider appt. Recall was 4/2024. Will message MA staff to schedule overdue f/u.   "

## 2024-07-15 ENCOUNTER — OFFICE VISIT (OUTPATIENT)
Dept: CARDIOLOGY | Facility: CLINIC | Age: 59
End: 2024-07-15
Payer: MEDICARE

## 2024-07-15 VITALS
DIASTOLIC BLOOD PRESSURE: 64 MMHG | WEIGHT: 98 LBS | HEIGHT: 64 IN | BODY MASS INDEX: 16.73 KG/M2 | OXYGEN SATURATION: 98 % | SYSTOLIC BLOOD PRESSURE: 118 MMHG

## 2024-07-15 DIAGNOSIS — N18.6 ESRD (END STAGE RENAL DISEASE) ON DIALYSIS: Chronic | ICD-10-CM

## 2024-07-15 DIAGNOSIS — I10 ESSENTIAL HYPERTENSION: Primary | Chronic | ICD-10-CM

## 2024-07-15 DIAGNOSIS — Z95.810 ICD (IMPLANTABLE CARDIOVERTER-DEFIBRILLATOR) IN PLACE: ICD-10-CM

## 2024-07-15 DIAGNOSIS — I42.8 CARDIOMYOPATHY, NONISCHEMIC: Chronic | ICD-10-CM

## 2024-07-15 DIAGNOSIS — I34.0 NONRHEUMATIC MITRAL VALVE REGURGITATION: ICD-10-CM

## 2024-07-15 DIAGNOSIS — I48.0 PAF (PAROXYSMAL ATRIAL FIBRILLATION): Chronic | ICD-10-CM

## 2024-07-15 DIAGNOSIS — Z99.2 ESRD (END STAGE RENAL DISEASE) ON DIALYSIS: Chronic | ICD-10-CM

## 2024-07-15 DIAGNOSIS — I50.42 CHRONIC COMBINED SYSTOLIC AND DIASTOLIC HEART FAILURE: ICD-10-CM

## 2024-07-15 PROCEDURE — 93010 ELECTROCARDIOGRAM REPORT: CPT | Mod: GW,S$PBB,, | Performed by: INTERNAL MEDICINE

## 2024-07-15 PROCEDURE — 99999 PR PBB SHADOW E&M-EST. PATIENT-LVL IV: CPT | Mod: PBBFAC,,, | Performed by: INTERNAL MEDICINE

## 2024-07-15 PROCEDURE — 99214 OFFICE O/P EST MOD 30 MIN: CPT | Mod: PBBFAC | Performed by: INTERNAL MEDICINE

## 2024-07-15 PROCEDURE — 93005 ELECTROCARDIOGRAM TRACING: CPT | Mod: PBBFAC | Performed by: INTERNAL MEDICINE

## 2024-07-15 PROCEDURE — 99214 OFFICE O/P EST MOD 30 MIN: CPT | Mod: GW,S$PBB,, | Performed by: INTERNAL MEDICINE

## 2024-07-15 NOTE — PROGRESS NOTES
Subjective   Patient ID:  Eva Bloom is a 58 y.o. female who presents for follow-up of Follow-up      HPI      [place] [facility]    Nonischemic cardiomyopathy   HFrEF s/p SC-ICD  Chronic pericardial effusion   Hypertension  Pulmonary hypertension  Renal cell carcinoma  ESRD on HD     Interval History  Presents with CP x 3-4 days. States it is a light pain and that comes and goes randomly and lasts a few seconds at a time. Nonexertional. She also reports LUE swelling. She has AVF in LUE. Also mild LE edema which is chronic. She denies SOB, syncope, near syncope, sustained palpitations. She had a nosebleed recently. Packing was removed about a week ago, no further bleeding. BP is typically 130s/70s, sometimes higher, at home. Of note, she was switched from Olmesartan to Entresto following her last visit. The Entresto caused diarrhea so she stopped it.      1/2021 HPI (Sowmya Stock)  Ms. Bloom is in clinic today for routine follow up.  At her last visit her, her metoprolol was increased to 100 mg daily and imdur was changed to isordil 30 mg BID to match components of bydil.  Denies hypotension during HD.  Her BP is running 130-140s systolic.  Patient denies chest pain with exertion or at rest, palpitations, SOB, WARREN, dizziness, syncope, edema, orthopnea, PND, or claudication.  Reports no routine exercise due to fatigue.     Echo 5/8/23  The left ventricle is normal in size with mild eccentric hypertrophy and severely decreased systolic function.  The estimated ejection fraction is 23%.  There is severe left ventricular global hypokinesis.  Grade II left ventricular diastolic dysfunction.  Moderate left atrial enlargement.  Normal right ventricular size with normal right ventricular systolic function.  Moderate right atrial enlargement.  There is mild aortic valve stenosis.  Aortic valve area is 1.58 cm2; peak velocity is 2.01 m/s; mean gradient is 8 mmHg.  Moderate mitral regurgitation.  Moderate to severe  tricuspid regurgitation.  Mild pulmonic regurgitation.  Elevated central venous pressure (15 mmHg).  The estimated PA systolic pressure is 47 mmHg.  There is mild-moderate pulmonary hypertension.  Moderate posterolateral pericardial effusion. Trivial under the RA     Echo 2/21  Mild concentric hypertrophy and moderately decreased systolic function. The estimated ejection fraction is 30%  Grade II left ventricular diastolic dysfunction.  Moderate mitral regurgitation.  Mild right ventricular enlargement with normal right ventricular systolic function.  Moderate to severe tricuspid regurgitation.  Mild aortic regurgitation.  Severe left atrial enlargement.  Moderate right atrial enlargement.  Intermediate central venous pressure (8 mmHg).  The estimated PA systolic pressure is 72 mmHg.  There is pulmonary hypertension.  Moderate posterior pericardial effusion.     PET 2017  CONCLUSIONS: NORMAL MYOCARDIAL PERFUSION PET STRESS TEST   1. The perfusion scan is free of evidence for myocardial ischemia or injury.   2. There is abnormal wall motion at rest showing mild global hypokinesis of the left ventricle. There is abnormal wall motion at stress showing mild global hypokinesis of the left ventricle.   3. There is resting LV dysfunction with a reduced ejection fraction of 41 %. There is stress induced LV dysfunction with a reduced ejection fraction of 48 %.  (normal is >= 51%)   4. The ventricular volumes are normal at rest and stress.   5. The extracardiac distribution of radioactivity is normal.   6. There was no previous study available to compare.       HPI:   patent feels weak with limited activity  No chest pain, Orthopnea, PND of heart failure symptoms.   Denies palpitations or fluttering in the chest  BP  Has been consistently elevated in the clinic but her pressures have been better at home and in the dialysis clinic per her.      HPI:   patent feels weak with limited activity  No chest pain, Orthopnea, PND of  heart failure symptoms.   Denies palpitations or fluttering in the chest  BP  Has been consistently elevated in the clinic but her pressures have been better at home and in the dialysis clinic per her.   Patient is sedentary and walks some.      Echo 7/5/24    Left Ventricle: The left ventricle is normal in size. Moderately increased wall thickness. There is moderate concentric hypertrophy. Severe global hypokinesis present. There is severely reduced systolic function with a visually estimated ejection fraction of 20 - 25%.    Right Ventricle: Moderate right ventricular enlargement. Systolic function is moderately reduced.    Aortic Valve: The aortic valve is a trileaflet valve. Mildly restricted motion. There is moderate stenosis. Aortic valve area by VTI is 0.86 cm². Aortic valve peak velocity is 2.34 m/s. Mean gradient is 12 mmHg. The dimensionless index is 0.31. Cannot exclude pseudostenosis due to low EF.    Mitral Valve: There is moderate bileaflet sclerosis. There is mild stenosis. The mean pressure gradient across the mitral valve is 3 mmHg at a heart rate of 80 bpm. There is moderate regurgitation with an eccentric jet.    Tricuspid Valve: There is severe regurgitation with an eccentrically directed jet.    Pulmonic Valve: There is moderate regurgitation.    Pulmonary Artery: The estimated pulmonary artery systolic pressure is 64 mmHg.    Pericardium: There is a moderate posterior effusion. No indication of cardiac tamponade.        HPI:   WARREN on moderate actvity  NYHA IV  No chest pain, Orthopnea, PND of heart failure symptoms.   Denies palpitations or fluttering in the chest  Minimal weight gain     Saw Dr Barton 10/8/23  Consult received to deactivate the defibrillator.    I discussed the case with the patient's family member at the bedside.  The patient has metastatic renal cell carcinoma.  [employer] was contacted.  They will come by today to turn off the subcutaneous ICD.     4/10/24 Was in  hospice but family took her out. Followed by vascular surgery for PAD and oncology for metastatic renal CA. Denies CP. Mild WARREN  BP controlled  EKG NSR NSSTT changes QTc 524  Discussed reactivating SC AICD given that she is no longer in hospice - family declined and wish to leave device deactivated  OV 3 months with echo  Continue Rx for NICM, CHF, PAD, HLD, ESRD, HTN       7/15/24 Weak. Denies CP or SOB  BP controlled  EKG NSR NSSTT changes QTc 522  Says she rarely takes zofran       Review of Systems   Constitutional: Negative for decreased appetite.   HENT:  Negative for ear discharge.    Eyes:  Negative for blurred vision.   Respiratory:  Negative for hemoptysis.    Endocrine: Negative for polyphagia.   Hematologic/Lymphatic: Negative for adenopathy.   Skin:  Negative for color change.   Musculoskeletal:  Negative for joint swelling.   Genitourinary:  Negative for bladder incontinence.   Neurological:  Negative for brief paralysis.   Psychiatric/Behavioral:  Negative for hallucinations.    Allergic/Immunologic: Negative for hives.          Objective     Physical Exam  Constitutional:       Appearance: She is well-developed.   HENT:      Head: Normocephalic and atraumatic.   Eyes:      Conjunctiva/sclera: Conjunctivae normal.      Pupils: Pupils are equal, round, and reactive to light.   Cardiovascular:      Rate and Rhythm: Normal rate.      Pulses: Intact distal pulses.      Heart sounds: Normal heart sounds.   Pulmonary:      Effort: Pulmonary effort is normal.      Breath sounds: Normal breath sounds.   Abdominal:      General: Bowel sounds are normal.      Palpations: Abdomen is soft.   Musculoskeletal:         General: Normal range of motion.      Cervical back: Normal range of motion and neck supple.   Skin:     General: Skin is warm and dry.   Neurological:      Mental Status: She is alert and oriented to person, place, and time.            Assessment and Plan     1. Essential hypertension    2.  Cardiomyopathy, nonischemic    3. PAF (paroxysmal atrial fibrillation)    4. Nonrheumatic mitral valve regurgitation    5. ICD (implantable cardioverter-defibrillator) in place - SubQ    6. Chronic combined systolic and diastolic heart failure    7. ESRD (end stage renal disease) on dialysis        Plan:    Echo with stable EF 25%, moderate AS and pericardial effusion  Still wishes to have AICD left deactivated   Continue Rx for NICM, CHF, PAD, HLD, ESRD, HTN  OV 6 months       Advance Care Planning     Date: 07/15/2024  Patient did not wish or was not able to name a surrogate decision maker or provide an Advance Care Plan.

## 2024-07-15 NOTE — PROGRESS NOTES
Subjective     Patient ID: Eva Bloom is a 58 y.o. female.    Chief Complaint: Pain of the Pelvis    HPI    Patient is a 58 year old female who presents to clinic with chief complaint of right pelvic fracture x 6 weeks.   CT on 06/14/24 showed subacute fractures involving the right superior and inferior pubic ramus   Patient has been weight bearing as tolerated and ROMAT. She uses a walker to assist with ambulation. Her family member with her reports that he has noticed that over the past few days she is walking standing and doing more.     Review of Systems   Constitutional: Negative for chills and fever.   Cardiovascular:  Negative for chest pain.   Respiratory:  Negative for cough and shortness of breath.    Skin:  Negative for color change, dry skin, itching, nail changes, poor wound healing and rash.   Musculoskeletal:         Right pelvic fracture    Neurological:  Negative for dizziness.   Psychiatric/Behavioral:  Negative for altered mental status. The patient is not nervous/anxious.    All other systems reviewed and are negative.         Objective     General    Constitutional: She is oriented to person, place, and time. She appears well-developed and well-nourished. No distress.   HENT:   Head: Atraumatic.   Eyes: Conjunctivae are normal.   Cardiovascular:  Normal rate.            Pulmonary/Chest: Effort normal.   Neurological: She is alert and oriented to person, place, and time.   Psychiatric: She has a normal mood and affect. Her behavior is normal.             Right Hip Exam     Range of Motion   Flexion:  normal   External rotation:  normal   Internal rotation:  normal       Muscle Strength   Right Lower Extremity   Hip Abduction: 4/5   Hip Adduction: 4/5   Hip Flexion: 4/5       Physical Exam  Constitutional:       General: She is not in acute distress.     Appearance: She is well-developed and well-nourished. She is not diaphoretic.   HENT:      Head: Atraumatic.   Eyes:       Conjunctiva/sclera: Conjunctivae normal.   Cardiovascular:      Rate and Rhythm: Normal rate.   Pulmonary:      Effort: Pulmonary effort is normal.   Neurological:      Mental Status: She is alert and oriented to person, place, and time.   Psychiatric:         Mood and Affect: Mood and affect normal.         Behavior: Behavior normal.          RADS: reviewed by myself:   Reconfirmed now subacute fractures involving the right superior and inferior pubic ramus with no detrimental changes in the appearance of the fracture site or position and alignment of the fracture fragments. Given osteopenia, no additional fractures. Mild lower lumbar spine degenerative changes. Intact right and left SI joints. No narrowing of right or left hip joint spaces and preserved right and left femoral head contours. As defined on the above CT, reconfirmed the pelvic and medial thigh vascular calcifications as well as uterine calcifications.        Assessment and Plan     Encounter Diagnosis   Name Primary?    Closed fracture of multiple rami of right pubis, initial encounter        Discussed plan with the patient. At this time plan is to continue non-operative treatment. Weight bearing as tolerated, ROMAT. Discussed home health PT , patient declined she has become more active over the past few days and will continue to work on her own. Return to clinic in 6 weeks at that time x-ray of her pelvis AP inlet outlet is needed.

## 2024-07-19 LAB
OHS QRS DURATION: 108 MS
OHS QTC CALCULATION: 522 MS

## 2024-07-22 ENCOUNTER — HOSPITAL ENCOUNTER (OUTPATIENT)
Dept: WOUND CARE | Facility: HOSPITAL | Age: 59
Discharge: HOME | End: 2024-07-22
Attending: INTERNAL MEDICINE
Payer: MEDICARE

## 2024-07-22 VITALS
SYSTOLIC BLOOD PRESSURE: 135 MMHG | DIASTOLIC BLOOD PRESSURE: 83 MMHG | RESPIRATION RATE: 16 BRPM | BODY MASS INDEX: 16.82 KG/M2 | HEART RATE: 83 BPM | TEMPERATURE: 98 F | WEIGHT: 98 LBS

## 2024-07-22 DIAGNOSIS — I50.22 CHRONIC SYSTOLIC HEART FAILURE: ICD-10-CM

## 2024-07-22 DIAGNOSIS — L89.620 PRESSURE INJURY OF LEFT HEEL, UNSTAGEABLE: ICD-10-CM

## 2024-07-22 DIAGNOSIS — L97.429: Primary | ICD-10-CM

## 2024-07-22 DIAGNOSIS — C64.9 METASTATIC RENAL CELL CARCINOMA, UNSPECIFIED LATERALITY: ICD-10-CM

## 2024-07-22 DIAGNOSIS — I73.9 PAD (PERIPHERAL ARTERY DISEASE): ICD-10-CM

## 2024-07-22 PROCEDURE — 99214 OFFICE O/P EST MOD 30 MIN: CPT | Mod: GV,,, | Performed by: INTERNAL MEDICINE

## 2024-07-22 PROCEDURE — 99213 OFFICE O/P EST LOW 20 MIN: CPT | Performed by: INTERNAL MEDICINE

## 2024-07-22 RX ORDER — OXYCODONE HYDROCHLORIDE 5 MG/1
5 TABLET ORAL EVERY 8 HOURS PRN
Qty: 45 TABLET | Refills: 0 | Status: SHIPPED | OUTPATIENT
Start: 2024-07-22

## 2024-07-22 RX ORDER — LACTULOSE 10 G/15ML
10 SOLUTION ORAL; RECTAL 3 TIMES DAILY PRN
Qty: 473 ML | Refills: 0 | Status: SHIPPED | OUTPATIENT
Start: 2024-07-22

## 2024-07-22 NOTE — PROGRESS NOTES
"[facility]                Progress Note    Subjective:       Patient ID: Eva Bloom is a 58 y.o. female.    Chief Complaint: Dressing Change and Wound Care    Follow Up wound care visit. Patient brought to exam via wheelchair pushed by her  to [facility], nurse at their side. Patient then self-ambulated to exam bed with 1 person standby. Prescribed Surgical Shoe on bilateral Feet. Patient denies fever, chills, nausea, vomiting or diarrhea at present. Patient c/o pain to Left Toes rating as 10/10 at present. Patient reports not having any issues with dressing to Right Thigh since last visit. Patient reports home health came out as ordered without complaint. Dressing appears intact to Right Lateral Thigh without strikethrough drainage. Dressing removed & wounds cleaned by nurse. Wounds to Left Foot appear stable. Patient reports "HH told me to have my Right toe drained (1st)", but not area of bogginess noted, skin appears discolored, but intact.     No change to dressing order; applied per MD orders. Patient will return to [facility] in 3 weeks. AVS printed and given to patient.      Tolerating full sessions with hemodialysis no low blood pressures no bleeding from graft site.  painting left heel and left toe daily with betadine no fevers/chills does have pain around left heel if hanging feet down using wheelchair for almost all mobility         Review of Systems      Objective:        Physical Exam  Constitutional:       Comments: cachectic   Pulmonary:      Effort: Pulmonary effort is normal. No respiratory distress.   Musculoskeletal:      Right lower leg: No edema.      Left lower leg: No edema.   Skin:     Comments: Right lateral thigh with pink granulation tissue to wound base no fluctuance no exposed muscle or bone    Left heel with firm eschar no fluctuance     Left third toe unchanged eschar   Neurological:      Mental Status: She is alert.         Vitals: "    07/22/24 0806   BP: 135/83   Pulse: 83   Resp: 16   Temp: 97.7 °F (36.5 °C)       Assessment:           ICD-10-CM ICD-9-CM   1. Ischemic ulcer of heel, left, with unspecified severity  L97.429 707.14   2. PAD (peripheral artery disease)  I73.9 443.9   3. Pressure injury of left heel, unstageable  L89.620 707.07     707.25   4. Chronic systolic heart failure  I50.22 428.22   5. Metastatic renal cell carcinoma, unspecified laterality  C64.9 189.0     199.1            (Retired) Altered Skin Integrity 11/01/23 2027 Right lateral Thigh Ulceration (Active)   11/01/23 2027   Altered Skin Integrity Present on Admission - Did Patient arrive to the hospital with altered skin?: yes   Side: Right   Orientation: lateral   Location: Thigh   Wound Number:    Is this injury device related?:    Primary Wound Type: Ulceration   Description of Altered Skin Integrity:    Ankle-Brachial Index:    Pulses:    Removal Indication and Assessment: removed per policy   Wound Outcome:    (Retired) Wound Length (cm):    (Retired) Wound Width (cm):    (Retired) Depth (cm):    Wound Description (Comments):    Removal Indications:    Wound Image    07/22/24 0951   Dressing Appearance Intact;Dried drainage 07/22/24 0951   Drainage Amount Scant 07/22/24 0951   Drainage Characteristics/Odor Serosanguineous;No odor 07/22/24 0951   Appearance Pink;Moist 07/22/24 0951   Tissue loss description Partial thickness 07/22/24 0951   Black (%), Wound Tissue Color 0 % 07/22/24 0951   Red (%), Wound Tissue Color 100 % 07/22/24 0951   Yellow (%), Wound Tissue Color 0 % 07/22/24 0951   Periwound Area Hemosiderin Staining 07/22/24 0951   Wound Edges Defined;Open 07/22/24 0951   Wound Length (cm) 0.2 cm 07/22/24 0951   Wound Width (cm) 0.3 cm 07/22/24 0951   Wound Depth (cm) 0.2 cm 07/22/24 0951   Wound Volume (cm^3) 0.012 cm^3 07/22/24 0951   Wound Surface Area (cm^2) 0.06 cm^2 07/22/24 0951   Tunneling (depth (cm)/location) 0 07/22/24 0951   Undermining (depth  (cm)/location) 0 07/22/24 0951   Care Cleansed with:;Antimicrobial agent;Sterile normal saline 07/22/24 0951   Dressing Changed 07/22/24 0951   Periwound Care Skin barrier film applied 07/22/24 0951   Dressing Change Due 08/12/24 07/22/24 0951            Altered Skin Integrity 02/22/24 0930 Left dorsal Toe, third Ulceration (Active)   02/22/24 0930   Altered Skin Integrity Present on Admission - Did Patient arrive to the hospital with altered skin?: yes   Side: Left   Orientation: dorsal   Location: Toe, third   Wound Number:    Is this injury device related?:    Primary Wound Type: Ulceration   Description of Altered Skin Integrity:    Ankle-Brachial Index:    Pulses:    Removal Indication and Assessment:    Wound Outcome:    (Retired) Wound Length (cm):    (Retired) Wound Width (cm):    (Retired) Depth (cm):    Wound Description (Comments):    Removal Indications:    Wound Image   07/22/24 0951   Dressing Appearance Open to air;No dressing 07/22/24 0951   Drainage Amount None 07/22/24 0951   Appearance Yellow;Eschar 07/22/24 0951   Black (%), Wound Tissue Color 0 % 07/22/24 0951   Red (%), Wound Tissue Color 0 % 07/22/24 0951   Yellow (%), Wound Tissue Color 100 % 07/22/24 0951   Periwound Area Dry 07/22/24 0951   Wound Edges Defined 07/22/24 0951   Wound Length (cm) 1.4 cm 07/22/24 0951   Wound Width (cm) 1.4 cm 07/22/24 0951   Wound Surface Area (cm^2) 1.96 cm^2 07/22/24 0951   Tunneling (depth (cm)/location) 0 07/22/24 0951   Undermining (depth (cm)/location) 0 07/22/24 0951   Care Cleansed with:;Antimicrobial agent;Sterile normal saline;Applied:;Povidone iodine 07/22/24 0951   Off Loading Off loading shoe 07/22/24 0951   Dressing Change Due 08/12/24 07/22/24 0951            Altered Skin Integrity 02/22/24 0930 Left plantar Heel Ulceration (Active)   02/22/24 0930   Altered Skin Integrity Present on Admission - Did Patient arrive to the hospital with altered skin?:    Side: Left   Orientation: plantar    Location: Heel   Wound Number:    Is this injury device related?:    Primary Wound Type: Ulceration   Description of Altered Skin Integrity:    Ankle-Brachial Index:    Pulses:    Removal Indication and Assessment:    Wound Outcome:    (Retired) Wound Length (cm):    (Retired) Wound Width (cm):    (Retired) Depth (cm):    Wound Description (Comments):    Removal Indications:    Wound Image   07/22/24 0951   Dressing Appearance Open to air;No dressing 07/22/24 0951   Drainage Amount Scant 07/22/24 0951   Drainage Characteristics/Odor Serosanguineous;No odor 07/22/24 0951   Appearance Eschar;Lorenz 07/22/24 0951   Black (%), Wound Tissue Color 100 % 07/22/24 0951   Red (%), Wound Tissue Color 0 % 07/22/24 0951   Yellow (%), Wound Tissue Color 0 % 07/22/24 0951   Periwound Area Dry 07/22/24 0951   Wound Edges Defined 07/22/24 0951   Wound Length (cm) 4.2 cm 07/22/24 0951   Wound Width (cm) 4.8 cm 07/22/24 0951   Wound Depth (cm) 0 cm 07/22/24 0951   Wound Volume (cm^3) 0 cm^3 07/22/24 0951   Wound Surface Area (cm^2) 20.16 cm^2 07/22/24 0951   Tunneling (depth (cm)/location) 0 07/22/24 0951   Undermining (depth (cm)/location) 0 07/22/24 0951   Care Cleansed with:;Antimicrobial agent;Sterile normal saline;Applied:;Povidone iodine 07/22/24 0951   Off Loading Off loading shoe 07/22/24 0951   Dressing Change Due 08/12/24 07/22/24 0951            Wound 07/22/24 Arterial Ulcer Left dorsal Toe, second (Active)   07/22/24    Present on Original Admission: Y   Primary Wound Type: Arterial ulc   Side: Left   Orientation: dorsal   Location: Toe, second   Wound Approximate Age at First Assessment (Weeks):    Wound Number:    Is this injury device related?:    Incision Type:    Closure Method:    Wound Description (Comments):    Type:    Additional Comments:    Ankle-Brachial Index:    Pulses:    Removal Indication and Assessment:    Wound Outcome:    Wound Image   07/22/24 0951   Dressing Appearance Open to air;No dressing  07/22/24 0951   Drainage Amount None 07/22/24 0951   Appearance Red;Intact 07/22/24 0951   Wound Length (cm) 1.4 cm 07/22/24 0951   Wound Width (cm) 1 cm 07/22/24 0951   Wound Depth (cm) 0 cm 07/22/24 0951   Wound Volume (cm^3) 0 cm^3 07/22/24 0951   Wound Surface Area (cm^2) 1.4 cm^2 07/22/24 0951   Tunneling (depth (cm)/location) 0 07/22/24 0951   Undermining (depth (cm)/location) 0 07/22/24 0951   Care Cleansed with:;Antimicrobial agent;Sterile normal saline;Applied:;Povidone iodine 07/22/24 0951            Wound 07/22/24 Arterial Ulcer Right distal Toe, first (Active)   07/22/24    Present on Original Admission: Y   Primary Wound Type: Arterial ulc   Side: Right   Orientation: distal   Location: Toe, first   Wound Approximate Age at First Assessment (Weeks):    Wound Number:    Is this injury device related?:    Incision Type:    Closure Method:    Wound Description (Comments):    Type:    Additional Comments:    Ankle-Brachial Index:    Pulses:    Removal Indication and Assessment:    Wound Outcome:    Wound Image   07/22/24 0951   Dressing Appearance Open to air;No dressing 07/22/24 0951   Drainage Amount None 07/22/24 0951   Appearance Maroon;Intact 07/22/24 0951   Care Cleansed with:;Antimicrobial agent;Sterile normal saline;Applied:;Povidone iodine 07/22/24 0951           Plan:          Evidence of distal eschars are unchanged no sharp debridement given poor vascular flow distal continue expectant management with betadine paint daily to prevent infection needs pain control will use APAP as first line oxycodone for break through pain lactulose for bowels. Continue home health for right thihg wound dressing change return in three weeks for wound check. Without vascular intervention would not expect wounds to heal. Patient with both systolic heart failure and metastatic cancer thereforen not canidate for hyperbaric oxygen    Tissue pathology and/or culture taken     [] Yes      [x] No  Sharp debridement  performed                   [] Yes       [x] No  Labs ordered     [] Yes       [x] No  Imaging ordered    [] Yes      [x] No    Orders Placed This Encounter   Procedures    Change dressing     Wound care orders    For right lateral thigh wound:  Dressing change frequency twice a week  Remove old dressing.  Cleanse: Normal Saline  Protect periwound with:  Cavilon  Primary dressing: Paint wound bed with betadine  Secondary dressing: Silicone border dressing to cover    Left 1st-3rd Toes and Left heel:  Clean with Normal Saline  Patient will paint with betadine daily. Leave open to air.    Return to clinic in 3 weeks,  to visit twice a week.    SUBSEQUENT HOME HEALTH ORDERS     Home Health for Wound Care. Visit twice a week. Patient will return to clinic on 08/12/24      Wound Dressing Orders     For right lateral thigh wound:   Dressing change frequency twice a week or prn   Remove old dressing.   Cleanse or irrigate with: Normal Saline   Protect periwound with:  Cavilon   Primary dressing: betadine iodine to wound bed  Secondary dressing: Cover with silicone border dressing (Mepilex, Foam Lite Border, etc)     For Right 1st Dital Toe, Left 1st-3rd Toes and Left heel.   Patient is using betadine to treat wounds on foot, daily. Leave open to air. Please provide supplies for daily changes.    Evaluate for acute changes (purulence, increased redness/swelling, increased drainage, malodor, increased pain, pallor, necrosis) please contact physician on any acute changes.    DO NOT DEVIATE FROM ORDER. PLEASE ORDER SUPPLIES NEED TO APPLY TO PATIENT'S WOUNDS. Call [facility] at [phone] if there are any questions about dressing application or substitutions.     Order Specific Question:   What Home Health Agency is the patient currently using?     Answer:   [facility]        Follow up in about 3 weeks (around 8/12/2024) for Wound Care.

## 2024-07-26 PROCEDURE — G0179 MD RECERTIFICATION HHA PT: HCPCS | Mod: GV,,, | Performed by: INTERNAL MEDICINE

## 2024-08-13 ENCOUNTER — DOCUMENT SCAN (OUTPATIENT)
Dept: HOME HEALTH SERVICES | Facility: HOSPITAL | Age: 59
End: 2024-08-13
Payer: MEDICARE

## 2024-08-19 ENCOUNTER — HOSPITAL ENCOUNTER (OUTPATIENT)
Dept: WOUND CARE | Facility: HOSPITAL | Age: 59
Discharge: HOME | End: 2024-08-19
Attending: INTERNAL MEDICINE
Payer: MEDICARE

## 2024-08-19 VITALS
DIASTOLIC BLOOD PRESSURE: 75 MMHG | HEART RATE: 81 BPM | RESPIRATION RATE: 14 BRPM | TEMPERATURE: 97 F | SYSTOLIC BLOOD PRESSURE: 140 MMHG

## 2024-08-19 DIAGNOSIS — L97.529 ISCHEMIC TOE ULCER, LEFT, WITH UNSPECIFIED SEVERITY: ICD-10-CM

## 2024-08-19 DIAGNOSIS — L89.893 PRESSURE ULCER OF RIGHT LEG, STAGE 3: ICD-10-CM

## 2024-08-19 DIAGNOSIS — N18.6 ESRD (END STAGE RENAL DISEASE) ON DIALYSIS: ICD-10-CM

## 2024-08-19 DIAGNOSIS — I50.22 CHRONIC SYSTOLIC HEART FAILURE: ICD-10-CM

## 2024-08-19 DIAGNOSIS — C64.9 METASTATIC RENAL CELL CARCINOMA, UNSPECIFIED LATERALITY: ICD-10-CM

## 2024-08-19 DIAGNOSIS — Z99.2 ESRD (END STAGE RENAL DISEASE) ON DIALYSIS: ICD-10-CM

## 2024-08-19 DIAGNOSIS — L97.429: Primary | ICD-10-CM

## 2024-08-19 PROCEDURE — 99213 OFFICE O/P EST LOW 20 MIN: CPT

## 2024-08-19 NOTE — PROGRESS NOTES
[facility]                Progress Note    Subjective:       Patient ID: Eva Bloom is a 58 y.o. female.    Chief Complaint: Dressing Change, Wound Care, and Wound Check    Follow Up wound care visit. Patient brought to exam room via wheelchair pushed by her , nurse at their side. Patient then self-ambulated to exam bed with 1 person standby. Prescribed Surgical Shoe on bilateral Feet. Patient denies fever, chills, nausea, vomiting or flu-like symptoms at present; c/o pain to bilateral feet rating as 10/10 at present. Patient reports not having any issues with dressing to Right Thigh since last visit. Patient reports home health came out as ordered. Pt reports drainage from right and left toes and left heel has increased. Dressing appears intact to Right Lateral Thigh without strikethrough drainage. Dressing removed & wounds cleaned by nurse. Wounds to Left Foot appear stable. Wound care done per MD orders. Pt spouse given printout of future appointments.     Right toe is painful at rest not walking (using wheelchair) running full times with hemodialysis scant drainage from right thigh     Dressing Change    Wound Check      Review of Systems      Objective:        Physical Exam  Constitutional:       Appearance: She is ill-appearing.   Eyes:      General: No scleral icterus.  Musculoskeletal:      Right lower leg: No edema.      Left lower leg: No edema.   Skin:     Comments: Right lateral thigh without induration or fluctuance pink based superficial ulcer    Right first toe dry firm black eschar    Left heel firm eshar no fluctuance    Third left toe with intact eschar   Neurological:      Mental Status: She is alert.         Vitals:    08/19/24 0911   BP: (!) 140/75   Pulse: 81   Resp: 14   Temp: 97.3 °F (36.3 °C)       Assessment:           ICD-10-CM ICD-9-CM   1. Ischemic ulcer of heel, left, with unspecified severity  L97.429 707.14   2. Chronic  systolic heart failure  I50.22 428.22   3. Metastatic renal cell carcinoma, unspecified laterality  C64.9 189.0     199.1   4. ESRD (end stage renal disease) on dialysis  N18.6 585.6    Z99.2 V45.11   5. Pressure ulcer of right leg, stage 3  L89.893 707.09     707.23   6. Ischemic toe ulcer, left, with unspecified severity  L97.529 707.15            (Retired) Altered Skin Integrity 11/01/23 2027 Right lateral Thigh Ulceration (Active)   11/01/23 2027   Altered Skin Integrity Present on Admission - Did Patient arrive to the hospital with altered skin?: yes   Side: Right   Orientation: lateral   Location: Thigh   Wound Number:    Is this injury device related?:    Primary Wound Type: Ulceration   Description of Altered Skin Integrity:    Ankle-Brachial Index:    Pulses:    Removal Indication and Assessment: removed per policy   Wound Outcome:    (Retired) Wound Length (cm):    (Retired) Wound Width (cm):    (Retired) Depth (cm):    Wound Description (Comments):    Removal Indications:    Wound Image   08/19/24 0917   Description of Altered Skin Integrity Full thickness tissue loss. Subcutaneous fat may be visible but bone, tendon or muscle are not exposed 08/19/24 0917   Dressing Appearance Dry;Intact;Clean 08/19/24 0917   Drainage Amount Scant 08/19/24 0917   Drainage Characteristics/Odor Serosanguineous;No odor 08/19/24 0917   Appearance Pink;Red;Moist 08/19/24 0917   Tissue loss description Full thickness 08/19/24 0917   Red (%), Wound Tissue Color 100 % 08/19/24 0917   Periwound Area Intact;Dry 08/19/24 0917   Wound Edges Open;Defined 08/19/24 0917   Wound Length (cm) 0.2 cm 08/19/24 0917   Wound Width (cm) 0.3 cm 08/19/24 0917   Wound Depth (cm) 0.2 cm 08/19/24 0917   Wound Volume (cm^3) 0.012 cm^3 08/19/24 0917   Wound Surface Area (cm^2) 0.06 cm^2 08/19/24 0917   Care Cleansed with:;Soap and water;Sterile normal saline 08/19/24 0917   Dressing Applied;Island/border 08/19/24 0917   Periwound Care Dry periwound  area maintained 08/19/24 0917   Dressing Change Due 08/26/24 08/19/24 0917            Altered Skin Integrity 02/22/24 0930 Left dorsal Toe, third Ulceration (Active)   02/22/24 0930   Altered Skin Integrity Present on Admission - Did Patient arrive to the hospital with altered skin?: yes   Side: Left   Orientation: dorsal   Location: Toe, third   Wound Number:    Is this injury device related?:    Primary Wound Type: Ulceration   Description of Altered Skin Integrity:    Ankle-Brachial Index:    Pulses:    Removal Indication and Assessment:    Wound Outcome:    (Retired) Wound Length (cm):    (Retired) Wound Width (cm):    (Retired) Depth (cm):    Wound Description (Comments):    Removal Indications:    Wound Image   08/19/24 0918   Description of Altered Skin Integrity Full thickness tissue loss. Base is covered by slough and/or eschar in the wound bed 08/19/24 0918   Dressing Appearance Open to air 08/19/24 0918   Drainage Amount Small 08/19/24 0918   Drainage Characteristics/Odor Serous;No odor 08/19/24 0918   Appearance Yellow;Eschar;Dry 08/19/24 0918   Tissue loss description Full thickness 08/19/24 0918   Black (%), Wound Tissue Color 10 % 08/19/24 0918   Yellow (%), Wound Tissue Color 90 % 08/19/24 0918   Periwound Area Intact;Dry 08/19/24 0918   Wound Length (cm) 1.4 cm 08/19/24 0918   Wound Width (cm) 1.4 cm 08/19/24 0918   Wound Surface Area (cm^2) 1.96 cm^2 08/19/24 0918   Care Cleansed with:;Soap and water;Sterile normal saline 08/19/24 0918   Dressing Applied 08/19/24 0918   Periwound Care Dry periwound area maintained 08/19/24 0918   Off Loading Off loading shoe 08/19/24 0918   Dressing Change Due 08/26/24 08/19/24 0918            Altered Skin Integrity 02/22/24 0930 Left plantar Heel Ulceration (Active)   02/22/24 0930   Altered Skin Integrity Present on Admission - Did Patient arrive to the hospital with altered skin?:    Side: Left   Orientation: plantar   Location: Heel   Wound Number:    Is this  injury device related?:    Primary Wound Type: Ulceration   Description of Altered Skin Integrity:    Ankle-Brachial Index:    Pulses:    Removal Indication and Assessment:    Wound Outcome:    (Retired) Wound Length (cm):    (Retired) Wound Width (cm):    (Retired) Depth (cm):    Wound Description (Comments):    Removal Indications:    Wound Image   08/19/24 0918   Description of Altered Skin Integrity Full thickness tissue loss. Base is covered by slough and/or eschar in the wound bed 08/19/24 0918   Dressing Appearance Open to air 08/19/24 0918   Drainage Amount Small 08/19/24 0918   Drainage Characteristics/Odor Serous;No odor 08/19/24 0918   Appearance Yellow;Eschar;Dry 08/19/24 0918   Tissue loss description Full thickness 08/19/24 0918   Black (%), Wound Tissue Color 60 % 08/19/24 0918   Yellow (%), Wound Tissue Color 40 % 08/19/24 0918   Wound Length (cm) 4.3 cm 08/19/24 0918   Wound Width (cm) 4.8 cm 08/19/24 0918   Wound Surface Area (cm^2) 20.64 cm^2 08/19/24 0918   Care Cleansed with:;Soap and water;Sterile normal saline 08/19/24 0918   Dressing Applied 08/19/24 0918   Periwound Care Dry periwound area maintained 08/19/24 0918   Off Loading Off loading shoe 08/19/24 0918   Dressing Change Due 08/26/24 08/19/24 0918            Wound 07/22/24 Arterial Ulcer Left dorsal Toe, second (Active)   07/22/24    Present on Original Admission: Y   Primary Wound Type: Arterial ulc   Side: Left   Orientation: dorsal   Location: Toe, second   Wound Approximate Age at First Assessment (Weeks):    Wound Number:    Is this injury device related?:    Incision Type:    Closure Method:    Wound Description (Comments):    Type:    Additional Comments:    Ankle-Brachial Index:    Pulses:    Removal Indication and Assessment:    Wound Outcome:    Wound Image   08/19/24 0918   Dressing Appearance Open to air 08/19/24 0918   Care Cleansed with:;Soap and water;Sterile normal saline 08/19/24 0918   Dressing Applied 08/19/24 0918    Periwound Care Dry periwound area maintained 08/19/24 0918   Off Loading Football dressing 08/19/24 0918   Dressing Change Due 08/26/24 08/19/24 0918            Wound 07/22/24 Arterial Ulcer Right distal Toe, first (Active)   07/22/24    Present on Original Admission: Y   Primary Wound Type: Arterial ulc   Side: Right   Orientation: distal   Location: Toe, first   Wound Approximate Age at First Assessment (Weeks):    Wound Number:    Is this injury device related?:    Incision Type:    Closure Method:    Wound Description (Comments):    Type:    Additional Comments:    Ankle-Brachial Index:    Pulses:    Removal Indication and Assessment:    Wound Outcome:    Wound Image    08/19/24 0917   Care Cleansed with:;Soap and water;Sterile normal saline 08/19/24 0917   Dressing Applied 08/19/24 0917           Plan:          All wounds unchanged firm eshcars in place (unstageable heel pressure ulcer) avoidance of any compression float heels when in bed no sharp debridement given severe PAD. Continue expectant management to have repeat arterial studies Sep 11 will follow up in wound clinic afterwards    Tissue pathology and/or culture taken     [] Yes      [x] No  Sharp debridement performed                   [] Yes       [x] No  Labs ordered     [] Yes       [x] No  Imaging ordered    [] Yes      [x] No    Orders Placed This Encounter   Procedures    Change dressing     Wound care orders    For right lateral thigh wound:  Dressing change frequency once a week  Remove old dressing.  Cleanse: Normal Saline  Protect periwound with:  Cavilon  Primary dressing: Paint wound bed with betadine  Secondary dressing: Silicone border dressing to cover    Left 1st-3rd Toes, Right 1st toe, and Left heel:  Clean with Normal Saline  Patient will paint with betadine daily. Leave open to air.    Return to clinic in 4 weeks    SUBSEQUENT HOME HEALTH ORDERS     Home Health for Wound Care. Visit twice a week. Patient will return to clinic on  09/16/24      Wound Dressing Orders    For right lateral thigh wound:  Dressing change frequency once a week or prn  Remove old dressing.  Cleanse or irrigate with: Normal Saline  Protect periwound with:  Cavilon  Primary dressing: betadine iodine to wound bed  Secondary dressing: Cover with silicone border dressing (Mepilex, Foam Lite Border, etc)    For Right 1st Dital Toe, Left 1st-3rd Toes and Left heel.  Patient is using betadine to treat wounds on foot, daily. Leave open to air. Please provide supplies for daily changes.    Evaluate for acute changes (purulence, increased redness/swelling, increased drainage, malodor, increased pain, pallor, necrosis) please contact physician on any acute changes.    DO NOT DEVIATE FROM ORDER. PLEASE ORDER SUPPLIES NEED TO APPLY TO PATIENT'S WOUNDS. Call [facility] at [phone] if there are any questions about dressing application or substitutions.     Order Specific Question:   What Home Health Agency is the patient currently using?     Answer:   [facility]        Follow up in about 4 weeks (around 9/16/2024) for Wound Care.

## 2024-08-23 ENCOUNTER — HOSPITAL ENCOUNTER (OUTPATIENT)
Dept: RADIOLOGY | Facility: HOSPITAL | Age: 59
Discharge: HOME | End: 2024-08-23
Attending: PHYSICIAN ASSISTANT
Payer: MEDICARE

## 2024-08-23 ENCOUNTER — OFFICE VISIT (OUTPATIENT)
Dept: ORTHOPEDICS | Facility: CLINIC | Age: 59
End: 2024-08-23
Payer: MEDICARE

## 2024-08-23 VITALS — HEIGHT: 64 IN | WEIGHT: 98.13 LBS | BODY MASS INDEX: 16.75 KG/M2

## 2024-08-23 DIAGNOSIS — S32.591A CLOSED FRACTURE OF MULTIPLE RAMI OF RIGHT PUBIS, INITIAL ENCOUNTER: Primary | ICD-10-CM

## 2024-08-23 DIAGNOSIS — S32.591A CLOSED FRACTURE OF MULTIPLE RAMI OF RIGHT PUBIS, INITIAL ENCOUNTER: ICD-10-CM

## 2024-08-23 PROCEDURE — 72190 X-RAY EXAM OF PELVIS: CPT | Mod: TC

## 2024-08-23 PROCEDURE — 99213 OFFICE O/P EST LOW 20 MIN: CPT | Mod: S$PBB,GW,, | Performed by: PHYSICIAN ASSISTANT

## 2024-08-23 PROCEDURE — 72190 X-RAY EXAM OF PELVIS: CPT | Mod: 26,GV,, | Performed by: RADIOLOGY

## 2024-08-23 PROCEDURE — 99999 PR PBB SHADOW E&M-EST. PATIENT-LVL III: CPT | Mod: PBBFAC,,, | Performed by: PHYSICIAN ASSISTANT

## 2024-08-23 PROCEDURE — 99213 OFFICE O/P EST LOW 20 MIN: CPT | Mod: PBBFAC,25,GW | Performed by: PHYSICIAN ASSISTANT

## 2024-08-23 NOTE — PROGRESS NOTES
Problem: Falls - Risk of  Goal: *Absence of Falls  Description: Document Armando Stern Fall Risk and appropriate interventions in the flowsheet.   Outcome: Progressing Towards Goal  Note: Fall Risk Interventions:  Mobility Interventions: Bed/chair exit alarm, Communicate number of staff needed for ambulation/transfer, OT consult for ADLs, Patient to call before getting OOB, PT Consult for mobility concerns, PT Consult for assist device competence, Strengthening exercises (ROM-active/passive), Utilize walker, cane, or other assistive device         Medication Interventions: Assess postural VS orthostatic hypotension, Bed/chair exit alarm, Evaluate medications/consider consulting pharmacy, Patient to call before getting OOB, Teach patient to arise slowly    Elimination Interventions: Bed/chair exit alarm, Call light in reach, Patient to call for help with toileting needs, Toileting schedule/hourly rounds, Toilet paper/wipes in reach    History of Falls Interventions: Bed/chair exit alarm, Consult care management for discharge planning, Door open when patient unattended, Evaluate medications/consider consulting pharmacy, Investigate reason for fall, Room close to nurse's station, Utilize gait belt for transfer/ambulation, Assess for delayed presentation/identification of injury for 48 hrs (comment for end date), Vital signs minimum Q4HRs X 24 hrs (comment for end date)         Problem: Patient Education: Go to Patient Education Activity  Goal: Patient/Family Education  Outcome: Progressing Towards Goal     Problem: Patient Education: Go to Patient Education Activity  Goal: Patient/Family Education  Outcome: Progressing Towards Goal     Problem: Patient Education: Go to Patient Education Activity  Goal: Patient/Family Education  Outcome: Progressing Towards Goal Subjective     Patient ID: Eva Bloom is a 58 y.o. female.    Chief Complaint: Pain of the Pelvis    HPI    Patient is a 58 year old female who presents to clinic with chief complaint of right pelvic fracture x 6 weeks.   CT on 06/14/24 showed subacute fractures involving the right superior and inferior pubic ramus   Patient has been weight bearing as tolerated and ROMAT. She uses a walker to assist with ambulation. Her family member with her reports that he has noticed that over the past few days she is walking standing and doing more.     08/23/24: Overall reports doing much better. No issues with pelvis main complaint is her feet which is followed by another provider. Reports not being as mobile due to her feet not pelvis     Review of Systems   Constitutional: Negative for chills and fever.   Cardiovascular:  Negative for chest pain.   Respiratory:  Negative for cough and shortness of breath.    Skin:  Negative for color change, dry skin, itching, nail changes, poor wound healing and rash.   Musculoskeletal:         Right pelvic fracture    Neurological:  Negative for dizziness.   Psychiatric/Behavioral:  Negative for altered mental status. The patient is not nervous/anxious.    All other systems reviewed and are negative.         Objective     General    Constitutional: She is oriented to person, place, and time. She appears well-developed and well-nourished. No distress.   HENT:   Head: Atraumatic.   Eyes: Conjunctivae are normal.   Cardiovascular:  Normal rate.            Pulmonary/Chest: Effort normal.   Neurological: She is alert and oriented to person, place, and time.   Psychiatric: She has a normal mood and affect. Her behavior is normal.             Right Hip Exam     Range of Motion   Flexion:  normal   External rotation:  normal   Internal rotation:  normal       Muscle Strength   Right Lower Extremity   Hip Abduction: 4/5   Hip Adduction: 4/5   Hip Flexion: 4/5       Physical  Exam  Constitutional:       General: She is not in acute distress.     Appearance: She is well-developed and well-nourished. She is not diaphoretic.   HENT:      Head: Atraumatic.   Eyes:      Conjunctiva/sclera: Conjunctivae normal.   Cardiovascular:      Rate and Rhythm: Normal rate.   Pulmonary:      Effort: Pulmonary effort is normal.   Neurological:      Mental Status: She is alert and oriented to person, place, and time.   Psychiatric:         Mood and Affect: Mood and affect normal.         Behavior: Behavior normal.          RADS: reviewed by myself:   Healing fractures of the right inferior and superior pubic rami identified. The position alignment is satisfactory and unchanged as compared to the previous study. Mild DJD of osteopenia and significant vascular calcifications identified as before      Assessment and Plan     Encounter Diagnosis   Name Primary?    Closed fracture of multiple rami of right pubis, initial encounter Yes       Discussed plan with the patient. At this time plan is to continue non-operative treatment. Weight bearing as tolerated, ROMAT. Discussed home health PT , patient declined  will continue to work on her own. Return to clinic in 6 weeks if any issues at that time x-ray of her pelvis AP inlet outlet is needed.

## 2024-09-16 ENCOUNTER — HOSPITAL ENCOUNTER (OUTPATIENT)
Dept: CARDIOLOGY | Facility: HOSPITAL | Age: 59
Discharge: HOME | End: 2024-09-16
Attending: SURGERY
Payer: MEDICARE

## 2024-09-16 ENCOUNTER — HOSPITAL ENCOUNTER (OUTPATIENT)
Dept: RADIOLOGY | Facility: HOSPITAL | Age: 59
Discharge: HOME | End: 2024-09-16
Attending: NURSE PRACTITIONER
Payer: MEDICARE

## 2024-09-16 ENCOUNTER — HOSPITAL ENCOUNTER (OUTPATIENT)
Dept: WOUND CARE | Facility: HOSPITAL | Age: 59
Discharge: HOME | End: 2024-09-16
Attending: INTERNAL MEDICINE
Payer: MEDICARE

## 2024-09-16 VITALS
TEMPERATURE: 98 F | RESPIRATION RATE: 16 BRPM | DIASTOLIC BLOOD PRESSURE: 90 MMHG | HEART RATE: 90 BPM | SYSTOLIC BLOOD PRESSURE: 139 MMHG

## 2024-09-16 DIAGNOSIS — L89.893 PRESSURE ULCER OF RIGHT LEG, STAGE 3: ICD-10-CM

## 2024-09-16 DIAGNOSIS — L97.529 ISCHEMIC TOE ULCER, LEFT, WITH UNSPECIFIED SEVERITY: ICD-10-CM

## 2024-09-16 DIAGNOSIS — N18.6 ESRD (END STAGE RENAL DISEASE) ON DIALYSIS: ICD-10-CM

## 2024-09-16 DIAGNOSIS — Z99.2 ESRD (END STAGE RENAL DISEASE) ON DIALYSIS: ICD-10-CM

## 2024-09-16 DIAGNOSIS — C64.9 METASTATIC RENAL CELL CARCINOMA, UNSPECIFIED LATERALITY: ICD-10-CM

## 2024-09-16 DIAGNOSIS — I73.9 PAD (PERIPHERAL ARTERY DISEASE): ICD-10-CM

## 2024-09-16 DIAGNOSIS — C79.89 METASTATIC RENAL CELL CARCINOMA TO INTRA-ABDOMINAL SITE: ICD-10-CM

## 2024-09-16 DIAGNOSIS — I50.22 CHRONIC SYSTOLIC HEART FAILURE: ICD-10-CM

## 2024-09-16 DIAGNOSIS — C64.9 METASTATIC RENAL CELL CARCINOMA TO INTRA-ABDOMINAL SITE: ICD-10-CM

## 2024-09-16 DIAGNOSIS — Z90.5 S/P NEPHRECTOMY: ICD-10-CM

## 2024-09-16 DIAGNOSIS — S32.591A CLOSED FRACTURE OF MULTIPLE RAMI OF RIGHT PUBIS, INITIAL ENCOUNTER: ICD-10-CM

## 2024-09-16 DIAGNOSIS — E83.52 HYPERCALCEMIA: ICD-10-CM

## 2024-09-16 DIAGNOSIS — I51.89 COMBINED SYSTOLIC AND DIASTOLIC CARDIAC DYSFUNCTION: ICD-10-CM

## 2024-09-16 DIAGNOSIS — L97.429: Primary | ICD-10-CM

## 2024-09-16 DIAGNOSIS — C64.2 METASTATIC RENAL CELL CARCINOMA, LEFT: ICD-10-CM

## 2024-09-16 PROCEDURE — 99499 UNLISTED E&M SERVICE: CPT | Mod: GW,,, | Performed by: INTERNAL MEDICINE

## 2024-09-16 PROCEDURE — 99213 OFFICE O/P EST LOW 20 MIN: CPT | Mod: 25 | Performed by: INTERNAL MEDICINE

## 2024-09-16 PROCEDURE — 93925 LOWER EXTREMITY STUDY: CPT

## 2024-09-16 PROCEDURE — 74176 CT ABD & PELVIS W/O CONTRAST: CPT | Mod: TC

## 2024-09-16 PROCEDURE — 74176 CT ABD & PELVIS W/O CONTRAST: CPT | Mod: 26,GV,, | Performed by: RADIOLOGY

## 2024-09-16 PROCEDURE — 71250 CT THORAX DX C-: CPT | Mod: TC

## 2024-09-16 PROCEDURE — 99213 OFFICE O/P EST LOW 20 MIN: CPT | Mod: 25

## 2024-09-16 PROCEDURE — 93922 UPR/L XTREMITY ART 2 LEVELS: CPT

## 2024-09-16 PROCEDURE — 71250 CT THORAX DX C-: CPT | Mod: 26,GV,, | Performed by: RADIOLOGY

## 2024-09-16 RX ORDER — ACETAMINOPHEN AND CODEINE PHOSPHATE 300; 60 MG/1; MG/1
1 TABLET ORAL NIGHTLY PRN
Qty: 30 TABLET | Refills: 0 | Status: SHIPPED | OUTPATIENT
Start: 2024-09-16

## 2024-09-16 NOTE — PROGRESS NOTES
[facility]                Progress Note    Subjective:       Patient ID: Eva Bloom is a 58 y.o. female.    Chief Complaint: Dressing Change, Wound Care, and Wound Check    Follow Up wound care visit. Patient brought to exam room via wheelchair pushed by her  at side. Patient then self-ambulated to exam bed with 1 person standby. Prescribed Surgical Shoe on bilateral Feet. Patient denies fever, chills, nausea, vomiting or flu-like symptoms at present; c/o pain to bilateral feet rating as 10/10 at present. Patient reports not having any issues with dressing to Right Thigh since last visit. Patient reports home health came out as ordered. Pt reports nail from right first toe fell off. Dressing appears intact to Right Lateral Thigh without strikethrough drainage. Dressing removed & wounds cleaned by nurse. Wounds to Left Foot appear stable. Wound to right great toe is moist and sloughy. Wound care done per MD orders. Pt spouse given printout of future appointments.    Feet more painful at night she has been taking tylenol 500mg every six hours with minimal relief. [facility] is to have arterial ultrasounds today prior to follo up with vascular surgery next week. She continues to attend hemodialysis three times per week no hypotension with dialysis rigth toe has been more painful so taht she has been wrapping it noted some drainage on take down today right thigh has remained dry    Wound Check      Review of Systems      Objective:        Physical Exam  Constitutional:       Appearance: She is ill-appearing.      Comments: Very thin   Musculoskeletal:      Right lower leg: No edema.      Left lower leg: No edema.   Skin:     Comments: Right lateral thigh with intact skin    Right first toe with necrotic base to nail no fluctuance the distal second toe firm ulceration no fluctuance   Neurological:      Mental Status: She is alert.         Vitals:    09/16/24 1035   BP:  (!) 139/90   Pulse: 90   Resp: 16   Temp: 97.5 °F (36.4 °C)       Assessment:           ICD-10-CM ICD-9-CM   1. Ischemic ulcer of heel, left, with unspecified severity  L97.429 707.14   2. Ischemic toe ulcer, left, with unspecified severity  L97.529 707.15   3. Pressure ulcer of right leg, stage 3  L89.893 707.09     707.23   4. Chronic systolic heart failure  I50.22 428.22   5. Metastatic renal cell carcinoma, unspecified laterality  C64.9 189.0     199.1   6. ESRD (end stage renal disease) on dialysis  N18.6 585.6    Z99.2 V45.11            (Retired) Altered Skin Integrity 11/01/23 2027 Right lateral Thigh Ulceration (Active)   11/01/23 2027   Altered Skin Integrity Present on Admission - Did Patient arrive to the hospital with altered skin?: yes   Side: Right   Orientation: lateral   Location: Thigh   Wound Number:    Is this injury device related?:    Primary Wound Type: Ulceration   Description of Altered Skin Integrity:    Ankle-Brachial Index:    Pulses:    Removal Indication and Assessment: removed per policy   Wound Outcome:    (Retired) Wound Length (cm):    (Retired) Wound Width (cm):    (Retired) Depth (cm):    Wound Description (Comments):    Removal Indications:    Wound Image   09/16/24 1038   Description of Altered Skin Integrity Full thickness tissue loss. Subcutaneous fat may be visible but bone, tendon or muscle are not exposed 09/16/24 1038   Dressing Appearance Dry;Intact;Clean 09/16/24 1038   Drainage Amount None 09/16/24 1038   Appearance Fibrin;Dry 09/16/24 1038   Tissue loss description Full thickness 09/16/24 1038   Periwound Area Intact;Dry 09/16/24 1038   Wound Edges Other (see comments) 09/16/24 1038   Wound Length (cm) 0.2 cm 09/16/24 1038   Wound Width (cm) 0.2 cm 09/16/24 1038   Wound Surface Area (cm^2) 0.04 cm^2 09/16/24 1038   Care Cleansed with:;Soap and water;Sterile normal saline 09/16/24 1038   Dressing Island/border 09/16/24 1038   Periwound Care Dry periwound area maintained  09/16/24 1038   Dressing Change Due 09/19/24 09/16/24 1038            Altered Skin Integrity 02/22/24 0930 Left dorsal Toe, third Ulceration (Active)   02/22/24 0930   Altered Skin Integrity Present on Admission - Did Patient arrive to the hospital with altered skin?: yes   Side: Left   Orientation: dorsal   Location: Toe, third   Wound Number:    Is this injury device related?:    Primary Wound Type: Ulceration   Description of Altered Skin Integrity:    Ankle-Brachial Index:    Pulses:    Removal Indication and Assessment:    Wound Outcome:    (Retired) Wound Length (cm):    (Retired) Wound Width (cm):    (Retired) Depth (cm):    Wound Description (Comments):    Removal Indications:    Wound Image   09/16/24 1040   Description of Altered Skin Integrity Full thickness tissue loss. Subcutaneous fat may be visible but bone, tendon or muscle are not exposed 09/16/24 1040   Dressing Appearance No dressing;Open to air 09/16/24 1040   Drainage Amount None 09/16/24 1040   Appearance Yellow;Dry;Eschar 09/16/24 1040   Tissue loss description Full thickness 09/16/24 1040   Black (%), Wound Tissue Color 40 % 09/16/24 1040   Yellow (%), Wound Tissue Color 60 % 09/16/24 1040   Periwound Area Intact;Dry 09/16/24 1040   Wound Edges Defined 09/16/24 1040   Wound Length (cm) 1.4 cm 09/16/24 1040   Wound Width (cm) 1.4 cm 09/16/24 1040   Wound Surface Area (cm^2) 1.96 cm^2 09/16/24 1040   Care Cleansed with:;Soap and water;Sterile normal saline 09/16/24 1040   Dressing Applied 09/16/24 1040   Periwound Care Dry periwound area maintained 09/16/24 1040   Dressing Change Due 09/17/24 09/16/24 1040            Altered Skin Integrity 02/22/24 0930 Left plantar Heel Ulceration (Active)   02/22/24 0930   Altered Skin Integrity Present on Admission - Did Patient arrive to the hospital with altered skin?:    Side: Left   Orientation: plantar   Location: Heel   Wound Number:    Is this injury device related?:    Primary Wound Type: Ulceration    Description of Altered Skin Integrity:    Ankle-Brachial Index:    Pulses:    Removal Indication and Assessment:    Wound Outcome:    (Retired) Wound Length (cm):    (Retired) Wound Width (cm):    (Retired) Depth (cm):    Wound Description (Comments):    Removal Indications:    Wound Image   09/16/24 1128   Description of Altered Skin Integrity Full thickness tissue loss. Subcutaneous fat may be visible but bone, tendon or muscle are not exposed 09/16/24 1128   Dressing Appearance Open to air;No dressing 09/16/24 1128   Drainage Amount Small 09/16/24 1128   Drainage Characteristics/Odor Serosanguineous;No odor 09/16/24 1128   Appearance Yellow;Eschar;Dry 09/16/24 1128   Tissue loss description Full thickness 09/16/24 1128   Black (%), Wound Tissue Color 40 % 09/16/24 1128   Yellow (%), Wound Tissue Color 60 % 09/16/24 1128   Wound Edges Defined;Open 09/16/24 1128   Wound Length (cm) 4.3 cm 09/16/24 1128   Wound Width (cm) 4.8 cm 09/16/24 1128   Wound Surface Area (cm^2) 20.64 cm^2 09/16/24 1128   Care Cleansed with:;Soap and water;Sterile normal saline 09/16/24 1128   Dressing Applied 09/16/24 1128   Periwound Care Dry periwound area maintained 09/16/24 1128   Dressing Change Due 09/17/24 09/16/24 1128            Wound 07/22/24 Arterial Ulcer Left dorsal Toe, second (Active)   07/22/24    Present on Original Admission: Y   Primary Wound Type: Arterial ulc   Side: Left   Orientation: dorsal   Location: Toe, second   Wound Approximate Age at First Assessment (Weeks):    Wound Number:    Is this injury device related?:    Incision Type:    Closure Method:    Wound Description (Comments):    Type:    Additional Comments:    Ankle-Brachial Index:    Pulses:    Removal Indication and Assessment:    Wound Outcome:    Wound Image   09/16/24 1128   Dressing Appearance Open to air;No dressing 09/16/24 1128   Drainage Amount None 09/16/24 1128   Appearance Yellow;Eschar;Dry 09/16/24 1128   Tissue loss description Full  thickness 09/16/24 1128   Black (%), Wound Tissue Color 50 % 09/16/24 1128   Yellow (%), Wound Tissue Color 50 % 09/16/24 1128   Wound Edges Open;Defined 09/16/24 1128   Wound Length (cm) 0.3 cm 09/16/24 1128   Wound Width (cm) 0.3 cm 09/16/24 1128   Wound Surface Area (cm^2) 0.09 cm^2 09/16/24 1128   Care Cleansed with:;Soap and water;Sterile normal saline 09/16/24 1128   Dressing Applied 09/16/24 1128   Periwound Care Dry periwound area maintained 09/16/24 1128   Dressing Change Due 09/17/24 09/16/24 1128            Wound 07/22/24 Arterial Ulcer Right distal Toe, first (Active)   07/22/24    Present on Original Admission: Y   Primary Wound Type: Arterial ulc   Side: Right   Orientation: distal   Location: Toe, first   Wound Approximate Age at First Assessment (Weeks):    Wound Number:    Is this injury device related?:    Incision Type:    Closure Method:    Wound Description (Comments):    Type:    Additional Comments:    Ankle-Brachial Index:    Pulses:    Removal Indication and Assessment:    Wound Outcome:    Wound Image   09/16/24 1128   Dressing Appearance Dry;Intact;Clean 09/16/24 1128   Drainage Amount Small 09/16/24 1128   Drainage Characteristics/Odor Serosanguineous;No odor 09/16/24 1128   Appearance Yellow;Eschar;Slough;Moist 09/16/24 1128   Tissue loss description Full thickness 09/16/24 1128   Black (%), Wound Tissue Color 60 % 09/16/24 1128   Yellow (%), Wound Tissue Color 40 % 09/16/24 1128   Periwound Area Intact;Dry 09/16/24 1128   Wound Edges Open;Defined 09/16/24 1128   Wound Length (cm) 1.1 cm 09/16/24 1128   Wound Width (cm) 1.6 cm 09/16/24 1128   Wound Depth (cm) 0.2 cm 09/16/24 1128   Wound Volume (cm^3) 0.352 cm^3 09/16/24 1128   Wound Surface Area (cm^2) 1.76 cm^2 09/16/24 1128   Care Cleansed with:;Soap and water;Sterile normal saline 09/16/24 1128   Dressing Applied 09/16/24 1128   Periwound Care Dry periwound area maintained 09/16/24 1128   Dressing Change Due 09/17/24 09/16/24 1128            Plan:            She has dry gangrene to toes no change in wound dimensions over the last one month. No sharp debridement given severe proximal PAD. Continue painting with betadine tylenol #3 at night to lessen ischemic pain   Tissue pathology and/or culture taken     [] Yes      [x] No  Sharp debridement performed                   [] Yes       [x] No  Labs ordered     [] Yes       [x] No  Imaging ordered    [] Yes      [x] No    Orders Placed This Encounter   Procedures    Change dressing     Wound care orders    For right lateral thigh wound:  Dressing change frequency once a week  Remove old dressing.  Cleanse: Normal Saline  Protect periwound with:  Cavilon  Primary dressing: Paint wound bed with betadine  Secondary dressing: Silicone border dressing to cover    Left 1st-3rd Toes, Right 1st toe, and Left heel:  Clean with Normal Saline  Patient will paint with betadine daily. Leave open to air.    Return to clinic in 4 weeks    SUBSEQUENT HOME HEALTH ORDERS     Home Health for Wound Care. Visit twice a week. Patient will return to clinic on 10/14/24      Wound Dressing Orders    For right lateral thigh wound:  Dressing change frequency once a week or prn  Remove old dressing.  Cleanse or irrigate with: Normal Saline  Protect periwound with:  Cavilon  Primary dressing: betadine iodine to wound bed  Secondary dressing: Cover with silicone border dressing (Mepilex, Foam Lite Border, etc)    For Right 1st Dital Toe, Left 1st-3rd Toes and Left heel.  Patient is using betadine to treat wounds on foot, daily. Leave open to air. Please provide supplies for daily changes.    Evaluate for acute changes (purulence, increased redness/swelling, increased drainage, malodor, increased pain, pallor, necrosis) please contact physician on any acute changes.    DO NOT DEVIATE FROM ORDER. PLEASE ORDER SUPPLIES NEED TO APPLY TO PATIENT'S WOUNDS. Call [facility] at [phone] if there are any questions about dressing  application or substitutions.     Order Specific Question:   What Home Health Agency is the patient currently using?     Answer:   Other/External        Follow up in about 4 weeks (around 10/14/2024) for Wound Care.

## 2024-09-17 LAB
LEFT ANT TIBIAL SYS PSV: 78 CM/S
LEFT CFA PSV: 66 CM/S
LEFT EXTERNAL ILIAC PSV: 58 CM/S
LEFT PERONEAL SYS PSV: 39 CM/S
LEFT POPLITEAL PSV: 50 CM/S
LEFT POST TIBIAL SYS PSV: 32 CM/S
LEFT PROFUNDA SYS PSV: 57 CM/S
LEFT SUPER FEMORAL DIST SYS PSV: 60 CM/S
LEFT SUPER FEMORAL MID SYS PSV: 65 CM/S
LEFT SUPER FEMORAL OSTIAL SYS PSV: 53 CM/S
LEFT SUPER FEMORAL PROX SYS PSV: 70 CM/S
LEFT TIB/PER TRUNK SYS PSV: 62 CM/S
RIGHT ANT TIBIAL SYS PSV: 62 CM/S
RIGHT ARM BP: 147 MMHG
RIGHT CFA PSV: 62 CM/S
RIGHT EXTERNAL ILLIAC PSV: 56 CM/S
RIGHT PERONEAL SYS PSV: 38 CM/S
RIGHT POPLITEAL PSV: 36 CM/S
RIGHT POST TIBIAL SYS PSV: 36 CM/S
RIGHT PROFUNDA SYS PSV: 29 CM/S
RIGHT SUPER FEMORAL DIST SYS PSV: 39 CM/S
RIGHT SUPER FEMORAL MID SYS PSV: 78 CM/S
RIGHT SUPER FEMORAL OSTIAL SYS PSV: 43 CM/S
RIGHT SUPER FEMORAL PROX SYS PSV: 113 CM/S
RIGHT TIB/PER TRUNK SYS PSV: 51 CM/S

## 2024-09-17 NOTE — PROGRESS NOTES
"  Subjective:       Patient ID: Eva Bloom is a 58 y.o. female.    Chief Complaint: RCC    HPI     58 y.o.female to clinic for follow up for metastatic RCC. Patient was previously on hospice but now not enrolled as she wants to continue dialysis. She is feeling stronger, remains off cabozantinib. Pt still is on ESRD with HD every TTS. Some L shoulder pain.      ECOG 3. She is accompanied with her . She presents in a wheelchair.      Oncologic History (From Chart and Patient):  Eva Bloom is a 58 y.o.female with ESRD on HD who was found to have two L renal masses. She underwent L lap nephrectomy on 1/12/16. 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 Margins were negative.  She healed from surgery well, but then developed a large ovarian mass.  This was removed by gyn along with multiple sites of metastatic disease in the pelvis.  Path was c/w recurrence of RCC.     11/20/16 Pelvic ultrasound reveals "Large heterogeneous cystic and solid mass which appears to arise from the right ovary with worrisome imaging features for malignancy.  Differential diagnosis includes malignant tumors such as serous or mucinous cystadenocarcinoma.  It is important to note that the patient is at risk for ovarian torsion due to the size of the mass.Multiple uterine fibroids are identified with the largest in the uterine fundus."    11/22/16 pathology reveals "FINAL PATHOLOGIC DIAGNOSIS-RIGHT OVARY AND FALLOPIAN TUBE, RIGHT SALPINGO-OOPHORECTOMY:  -Positive for malignancy, high grade carcinoma morphologically and immunohistochemically consistent with metastasis from the patient's known renal cell carcinoma"    Review of Systems   Constitutional:  Positive for activity change, appetite change, fatigue and unexpected weight change. Negative for chills and fever.   HENT:  Negative for congestion, hearing " loss, mouth sores, postnasal drip, sore throat, tinnitus and voice change.    Eyes:  Negative for pain and visual disturbance.   Respiratory:  Negative for cough, shortness of breath and wheezing.    Cardiovascular:  Negative for chest pain, palpitations and leg swelling.   Gastrointestinal:  Negative for abdominal pain, constipation, diarrhea, nausea and vomiting.   Endocrine: Negative for cold intolerance and heat intolerance.   Genitourinary:  Negative for difficulty urinating, dyspareunia, dysuria, frequency, menstrual problem, urgency, vaginal bleeding, vaginal discharge and vaginal pain.   Musculoskeletal:  Negative for arthralgias and myalgias.        LE ulcerations and pain    Skin:  Negative for color change, rash and wound.   Allergic/Immunologic: Negative for environmental allergies and food allergies.   Neurological:  Negative for weakness, numbness and headaches.   Hematological:  Negative for adenopathy. Does not bruise/bleed easily.   Psychiatric/Behavioral:  Negative for agitation, confusion, hallucinations and sleep disturbance. The patient is not nervous/anxious.    All other systems reviewed and are negative.      Allergies:  Review of patient's allergies indicates:   Allergen Reactions    Allopurinol      Other reaction(s): abnormal transaminases       Medications:  Current Outpatient Medications   Medication Sig Dispense Refill    acetaminophen (TYLENOL) 500 MG tablet Take 500 mg by mouth every 6 (six) hours as needed for Pain.      acetaminophen (TYLENOL) 500 MG tablet Take 1 tablet (500 mg total) by mouth every 4 (four) hours as needed for Pain or Temperature greater than (100.5 or greater). 30 tablet 0    acetaminophen-codeine 300-60mg (TYLENOL #4) 300-60 mg Tab Take 1 tablet by mouth nightly as needed (foot pain). 30 tablet 0    albuterol-ipratropium (DUO-NEB) 2.5 mg-0.5 mg/3 mL nebulizer solution Take by nebulization.      apixaban (ELIQUIS) 2.5 mg Tab Take 1 tablet (2.5 mg total) by mouth  2 (two) times daily. 60 tablet 5    aspirin (ECOTRIN) 81 MG EC tablet Take 1 tablet (81 mg total) by mouth once daily. 90 tablet 2    atorvastatin (LIPITOR) 40 MG tablet Take 1 tablet (40 mg total) by mouth once daily. 90 tablet 3    bisacodyL (DULCOLAX) 10 mg Supp Place 10 mg rectally.      cabozantinib (CABOMETYX) 60 mg Tab TAKE ONE TABLET BY MOUTH ONCE DAILY AT THE SAME TIME ON AN EMPTY STOMACH AT LEAST 1 HOUR BEFORE OR 2 HOURS AFTER EATING. AVOID GRAPEFRUIT PRODUCTS 30 tablet 4    epoetin david-epbx (RETACRIT INJ)       hyoscyamine (LEVSIN/SL) 0.125 mg Subl Place under the tongue.      lactulose (CHRONULAC) 10 gram/15 mL solution Take 15 mLs (10 g total) by mouth 3 (three) times daily as needed (constipation). 473 mL 0    lanthanum (FOSRENOL) 1000 MG chewable tablet Take 1 tablet by mouth.      levothyroxine (SYNTHROID) 75 MCG tablet Take 1 tablet (75 mcg total) by mouth once daily. 90 tablet 2    lidocaine-prilocaine (EMLA) cream APPLY ATLEAST 30 MINUTES BEFORE TREATMENT 3 TIMES A WEEK 30 g 11    LORAZEPAM INTENSOL 2 mg/mL Conc Take by mouth.      metoprolol succinate (TOPROL-XL) 25 MG 24 hr tablet Take 1 tablet (25 mg total) by mouth once daily. 30 tablet 11    mirtazapine (REMERON) 7.5 MG Tab Take 1 tablet (7.5 mg total) by mouth every evening. 90 tablet 2    mv,Ca,min-folic acid-vit K1 (ONE-A-DAY WOMEN'S 50 PLUS) 400-20 mcg Tab Take 1 tablet by mouth once daily.      naloxone (NARCAN) 1 mg/mL injection 2 mg (1 mg per nostril) by Nasal route as needed for opioid overdose; may repeat in 3 to 5 minutes if not effective. Call 911 2 mL 11    nystatin (MYCOSTATIN) cream SMARTSIG:sparingly Topical 2-4 Times Daily PRN      ondansetron (ZOFRAN-ODT) 4 MG TbDL Take by mouth.      pantoprazole (PROTONIX) 40 MG tablet Take 1 tablet (40 mg total) by mouth 2 (two) times daily. 60 tablet 11     No current facility-administered medications for this visit.     Facility-Administered Medications Ordered in Other Visits    Medication Dose Route Frequency Provider Last Rate Last Admin    0.9%  NaCl infusion   Intravenous Continuous Soni Bhatti MD   New Bag at 07/21/23 1116       PMH:  Past Medical History:   Diagnosis Date    Anemia     Anticoagulant long-term use     Atrial fibrillation     Bronchitis 03/01/2017    Cancer 2016    kidney cancer    CHF (congestive heart failure), NYHA class II, chronic, systolic     CMV (cytomegalovirus) antibody positive     CVA (cerebral vascular accident) 1/3/2024    Encounter for blood transfusion     ESRD (end stage renal disease)     Essential hypertension 09/23/2015    H/O herpes simplex type 2 infection     Herpes simplex type 1 antibody positive     History of kidney cancer     s/p left nephrectomy 1/2016    Hyperparathyroidism, unspecified     Hyperuricemia without signs of inflammatory arthritis and tophaceous disease     Kidney stones     LGSIL (low grade squamous intraepithelial dysplasia)     Myocardiopathy 07/21/2017    Prediabetes     Proteinuria     Renal disorder     Thyroid nodule     Urate nephropathy        PSH:  Past Surgical History:   Procedure Laterality Date    BREAST CYST EXCISION      COLONOSCOPY N/A 11/12/2015    COLONOSCOPY N/A 03/12/2021    Procedure: COLONOSCOPY;  Surgeon: Brendon Lanier MD;  Location: [facility];  Service: Endoscopy;  Laterality: N/A;  covid test 3/9, labs prior, prep instr mailed -ml    EXCISION OF ARTERIOVENOUS FISTULA Left 09/27/2023    Procedure: EXCISION, AV FISTULA;  Surgeon: FARIDEH Muñoz III, MD;  Location: [facility] OR [address] [place] [address];  Service: Vascular;  Laterality: Left;  LUE AV graft excision    INSERTION OF BIVENTRICULAR IMPLANTABLE CARDIOVERTER-DEFIBRILLATOR (ICD)  04/2021    INSERTION OF TUNNELED CENTRAL VENOUS CATHETER (CVC) WITH SUBCUTANEOUS PORT N/A 01/25/2023    Procedure: INSERTION, DUAL LUMEN CATHETER WITH PORT, WITH IMAGING GUIDANCE;  Surgeon: FARIDEH Muñoz III, MD;  Location: [facility] OR [address] [place] [address];  Service: Peripheral Vascular;   Laterality: N/A;  possinble permcath placment    NEPHRECTOMY-LAPAROSCOPIC Left 01/12/2016    PERCUTANEOUS TRANSLUMINAL ANGIOPLASTY OF ARTERIOVENOUS FISTULA Left 04/19/2023    Procedure: PTA, AV FISTULA;  Surgeon: FARIDEH Muñoz III, MD;  Location: [facility] CATH LAB;  Service: Peripheral Vascular;  Laterality: Left;    PERITONEAL CATHETER INSERTION      Permacath insertion  01/12/2017    PLACEMENT OF ARTERIOVENOUS GRAFT  12/28/2022    Procedure: INSERTION, GRAFT, ARTERIOVENOUS;  Surgeon: FARIDEH Muñoz III, MD;  [facility];  Service: Peripheral Vascular;;    REMOVAL, GRAFT, ARTERIOVENOUS, UPPER EXTREMITY Left 01/25/2023    Procedure: REMOVAL, GRAFT, ARTERIOVENOUS, UPPER EXTREMITY;  Surgeon: FARIDEH Muñoz III, MD;  [facility];  Service: Peripheral Vascular;  Laterality: Left;    REVISION OF ARTERIOVENOUS FISTULA Left 05/01/2023    Procedure: REVISION, AV FISTULA;  Surgeon: FARIDEH Muñoz III, MD;  [facility];  Service: Peripheral Vascular;  Laterality: Left;  LUE AVG revision    REVISION OF PROCEDURE INVOLVING ARTERIOVENOUS GRAFT Left 12/28/2022    Procedure: REVISION, PROCEDURE INVOLVING ARTERIOVENOUS GRAFT;  Surgeon: FARIDEH Muñoz III, MD;  [facility];  Service: Peripheral Vascular;  Laterality: Left;    REVISION OF PROCEDURE INVOLVING ARTERIOVENOUS GRAFT Left 07/21/2023    Procedure: LEFT ARM ARTERIOVENOUS GRAFT EXCISION  AND LIGATION;  Surgeon: Obi Werner MD;  Location: [facility];  Service: Vascular;  Laterality: Left;  Left AVG excision and revision with interposition    SALPINGOOPHORECTOMY Right 2016    KJB---DAVINCI    TONSILLECTOMY      TUBAL LIGATION         FamHx:  Family History   Problem Relation Name Age of Onset    Hypertension Mother      Diabetes Father      Kidney disease Father      No Known Problems Sister Sophy     Heart disease Sister Maria A     No Known Problems Sister Claudine     No Known Problems Brother Dax     No  Known Problems Brother Don     Cataracts Maternal Aunt      No Known Problems Maternal Uncle      No Known Problems Paternal Aunt      No Known Problems Paternal Uncle      No Known Problems Maternal Grandmother      Diabetes Maternal Grandfather      No Known Problems Paternal Grandmother      No Known Problems Paternal Grandfather      No Known Problems Son      No Known Problems Son      No Known Problems Other      Breast cancer Neg Hx      Colon cancer Neg Hx      Cancer Neg Hx      Stroke Neg Hx      Amblyopia Neg Hx      Blindness Neg Hx      Glaucoma Neg Hx      Macular degeneration Neg Hx      Retinal detachment Neg Hx      Strabismus Neg Hx      Thyroid disease Neg Hx         SocHx:  Social History     Socioeconomic History    Marital status:     Number of children: 2   Occupational History    Occupation: [employer]     Employer: [id number]   Tobacco Use    Smoking status: Never     Passive exposure: Never    Smokeless tobacco: Never   Substance and Sexual Activity    Alcohol use: No    Drug use: Yes     Types: Hydrocodone    Sexual activity: Not Currently     Social Determinants of Health     Financial Resource Strain: Low Risk  (9/28/2023)    Overall Financial Resource Strain (CARDIA)     Difficulty of Paying Living Expenses: Not hard at all   Food Insecurity: No Food Insecurity (9/28/2023)    Hunger Vital Sign     Worried About Running Out of Food in the Last Year: Never true     Ran Out of Food in the Last Year: Never true   Transportation Needs: No Transportation Needs (9/28/2023)    PRAPARE - Transportation     Lack of Transportation (Medical): No     Lack of Transportation (Non-Medical): No   Physical Activity: Inactive (9/28/2023)    Exercise Vital Sign     Days of Exercise per Week: 0 days     Minutes of Exercise per Session: 0 min   Stress: No Stress Concern Present (9/28/2023)    [place] [employer] - Occupational Stress Questionnaire     Feeling of Stress : Not  at all   Housing Stability: Low Risk  (9/28/2023)    Housing Stability Vital Sign     Unable to Pay for Housing in the Last Year: No     Number of Places Lived in the Last Year: 1     Unstable Housing in the Last Year: No         Objective:     Vitals:    09/18/24 0948   BP: 104/64   Pulse: 81   Resp: 20   Temp: 97.9 °F (36.6 °C)         Physical Exam  Vitals and nursing note reviewed.   Constitutional:       General: She is not in acute distress.     Appearance: She is well-developed.      Comments: Thin appearance, in wheelchair   HENT:      Head: Normocephalic and atraumatic.      Nose: Nose normal.      Mouth/Throat:      Pharynx: No oropharyngeal exudate.   Eyes:      General:         Right eye: No discharge.         Left eye: No discharge.      Conjunctiva/sclera: Conjunctivae normal.      Pupils: Pupils are equal, round, and reactive to light.   Neck:      Trachea: No tracheal deviation.   Cardiovascular:      Comments: No swelling to bilateral lower extremities.   Musculoskeletal:         General: No tenderness. Normal range of motion.      Comments:      Skin:     General: Skin is warm and dry.      Coloration: Skin is not pale.      Findings: No erythema or rash.      Comments: Ulcerations on LE   Neurological:      Mental Status: She is alert and oriented to person, place, and time.   Psychiatric:         Behavior: Behavior normal.         Thought Content: Thought content normal.        LABS:  WBC   Date Value Ref Range Status   09/16/2024 6.83 3.90 - 12.70 K/uL Final     Hemoglobin   Date Value Ref Range Status   09/16/2024 8.4 (L) 12.0 - 16.0 g/dL Final     POC Hematocrit   Date Value Ref Range Status   10/10/2023 32 (L) 36 - 54 %PCV Final     Hematocrit   Date Value Ref Range Status   09/16/2024 29.1 (L) 37.0 - 48.5 % Final     Platelets   Date Value Ref Range Status   09/16/2024 316 150 - 450 K/uL Final       Chemistry        Component Value Date/Time     09/16/2024 1050    K 3.7 09/16/2024 1050      09/16/2024 1050    CO2 24 09/16/2024 1050    BUN 61 (H) 09/16/2024 1050    CREATININE 6.0 (H) 09/16/2024 1050     09/16/2024 1050        Component Value Date/Time    CALCIUM 9.9 09/16/2024 1050    ALKPHOS 647 (H) 09/16/2024 1050    AST 47 (H) 09/16/2024 1050    ALT 27 09/16/2024 1050    BILITOT 0.7 09/16/2024 1050        CT Chest Abdomen Pelvis Without Contrast (XPD)  Narrative: EXAMINATION:  CT CHEST ABDOMEN PELVIS WITHOUT CONTRAST(XPD)    CLINICAL HISTORY:  Kidney cancer, monitor; Acquired absence of kidney    TECHNIQUE:  Noncontrast images were obtained chest, abdomen, pelvis per protocol.  Coronal and sagittal images were reviewed.    COMPARISON:  CT chest abdomen pelvis without contrast dated 06/14/2024    FINDINGS:  Chest: Structures at the base of the neck demonstrate similar calcified thyroid nodules.  Left IJ catheter tip terminates at the right atrium.  Left chest wall subcutaneous AICD.  No new or worsening intrathoracic adenopathy.  Similar cardiomegaly and pericardial effusion with multi-vessel atherosclerosis.  The trachea is clear.    Mild basilar dependent subsegmental atelectasis or scarring.  Stable clustered small pulmonary nodules at the anterior right upper lobe, some of which are calcified (for reference series 4, image 98).  Additional scattered non-calcified micro-nodules (for reference 0.5 cm nodule at the right lower lobe on series 4, image 294), not significantly changed.  No focal consolidation.  No pleural effusion.    Abdomen and pelvis:    The liver, spleen, pancreas, and adrenal glands demonstrate unremarkable noncontrast appearance.  Gallbladder containing rim attenuating stones.  Similar atrophic right kidney with similar simple and minimally complex cysts.  More intermediate 1.3 cm density at the right upper pole more technically indeterminate though stable (for reference sagittal series 602, image 53).  No hydronephrosis.  Stable appearance of prior left  nephrectomy without suspicious nodule at the surgical bed.  Urinary bladder is mostly decompressed.  No suspicious adnexal mass.    The GI tract is normal in caliber.  Scattered stool present the colon.  Appendix normal.  No significant free fluid or pathologic adenopathy.  Redemonstration of 3.3 cm soft tissue mass at the midline small bowel mesentery, previously 3.2 cm (series 2, image 158).  Advanced multi-vessel atherosclerosis.    Diffusely heterogeneous bone marrow as can be seen in the setting of renal osteodystrophy.  Few remote appearing left-sided rib fractures.  Evolving changes of previous described right superior and inferior pubic rami fractures.  Impression: Postsurgical change of prior left nephrectomy.  No evidence for local recurrent disease.    Soft tissue mass at the midline small bowel mesentery measuring 3.3 cm, previously 3.2 cm.    Atrophic right kidney with similar simple and minimally complex cysts.  More intermediate attenuation lesion at the right upper renal pole measuring 1.3 cm, not significantly changed though suboptimally assessed.  Continued close attention at follow-up recommended at a minimum.    No convincing intrathoracic metastasis.    Cholelithiasis, advanced multi-vessel atherosclerosis, and additional findings as above.    Electronically signed by: John Dee  Date:    09/16/2024  Time:    12:58      Assessment:       1. Metastatic renal cell carcinoma, left    2. Metastatic renal cell carcinoma to intra-abdominal site            Plan:        Cancer Staging   Renal cell carcinoma of left kidney  Staging form: Kidney, AJCC 8th Edition  - Pathologic stage from 1/12/2016: Stage Unknown (pT1b(2), pNX, cM0) - Unsigned  - Clinical stage from 11/22/2016: Stage IV (rcTX, cN0, pM1) - Signed by Liset Ngo NP on 9/13/2023    Pt has had SD for long time on [place], and last scans were largely stable, however, no longer able to tolerate therapy due to comorbidities (HD/ESRD,  etc) and LE infection/wounds.  She was most recently enrolled on hospice but was then told she was not allowed to continue dialysis. Long discussion with patient and her accompanying  regarding current clinical status and that she does not qualify for further RCC treatment. They are in agreement and would like see current status of cancer activity, which is not unreasonable.     Scans previously showed slow enlargement of retroperitoneal mass which is not surprising that we are currently holding therapy. This is relatively stable from last visit.  Will con't to hold therapy and monitor.      RTC 3 months with CT scan, labs (CBC,CMP), and to see Dr. Rivera.  Appts ONLY on Mon/Wed/Fri (pt has dialysis on Tues and Thursday).      Patient was also seen and examined by Dr. Rivera. Patient is in agreement with the proposed treatment plan. All questions were answered to the patient's satisfaction. Pt knows to call clinic if anything is needed before the next clinic visit.    I, Dr. Ben Rivera, personally spent 40 mins during this encounter.     Ben Rivera M.D., M.S., F.A.C.P.  Hematology/Oncology Attending  [facility]             Med Onc Chart Routing      Follow up with physician 3 months. RTC 3 months with CT scan, labs (CBC,CMP), and to see Dr. Rivera.  Appts ONLY on Mon/Wed/Fri (pt has dialysis on Tues and Thursday).   Follow up with [facility]    Infusion scheduling note    Injection scheduling note    Labs CBC and CMP   Scheduling:  Preferred lab:  Lab interval:     Imaging CT chest abdomen pelvis      Pharmacy appointment    Other referrals

## 2024-09-18 ENCOUNTER — OFFICE VISIT (OUTPATIENT)
Dept: HEMATOLOGY/ONCOLOGY | Facility: CLINIC | Age: 59
End: 2024-09-18
Payer: MEDICARE

## 2024-09-18 VITALS
SYSTOLIC BLOOD PRESSURE: 104 MMHG | BODY MASS INDEX: 16.84 KG/M2 | HEIGHT: 64 IN | HEART RATE: 81 BPM | DIASTOLIC BLOOD PRESSURE: 64 MMHG | TEMPERATURE: 98 F | RESPIRATION RATE: 20 BRPM | OXYGEN SATURATION: 98 %

## 2024-09-18 DIAGNOSIS — C64.9 METASTATIC RENAL CELL CARCINOMA TO INTRA-ABDOMINAL SITE: ICD-10-CM

## 2024-09-18 DIAGNOSIS — C64.2 METASTATIC RENAL CELL CARCINOMA, LEFT: Primary | ICD-10-CM

## 2024-09-18 DIAGNOSIS — C79.89 METASTATIC RENAL CELL CARCINOMA TO INTRA-ABDOMINAL SITE: ICD-10-CM

## 2024-09-18 PROCEDURE — 99214 OFFICE O/P EST MOD 30 MIN: CPT | Mod: PBBFAC | Performed by: INTERNAL MEDICINE

## 2024-09-18 PROCEDURE — 99999 PR PBB SHADOW E&M-EST. PATIENT-LVL IV: CPT | Mod: PBBFAC,,, | Performed by: INTERNAL MEDICINE

## 2024-09-24 ENCOUNTER — TELEPHONE (OUTPATIENT)
Dept: VASCULAR SURGERY | Facility: CLINIC | Age: 59
End: 2024-09-24
Payer: MEDICARE

## 2024-09-25 ENCOUNTER — OFFICE VISIT (OUTPATIENT)
Dept: VASCULAR SURGERY | Facility: CLINIC | Age: 59
End: 2024-09-25
Payer: MEDICARE

## 2024-09-25 VITALS
HEART RATE: 83 BPM | SYSTOLIC BLOOD PRESSURE: 115 MMHG | BODY MASS INDEX: 18.1 KG/M2 | WEIGHT: 106 LBS | HEIGHT: 64 IN | DIASTOLIC BLOOD PRESSURE: 63 MMHG

## 2024-09-25 DIAGNOSIS — N18.6 ESRD (END STAGE RENAL DISEASE) ON DIALYSIS: Primary | ICD-10-CM

## 2024-09-25 DIAGNOSIS — L97.429 HEEL ULCER, LEFT, WITH UNSPECIFIED SEVERITY: ICD-10-CM

## 2024-09-25 DIAGNOSIS — Z99.2 ESRD (END STAGE RENAL DISEASE) ON DIALYSIS: Primary | ICD-10-CM

## 2024-09-25 PROCEDURE — 99213 OFFICE O/P EST LOW 20 MIN: CPT | Mod: PBBFAC | Performed by: SURGERY

## 2024-09-25 PROCEDURE — 99999 PR PBB SHADOW E&M-EST. PATIENT-LVL III: CPT | Mod: PBBFAC,,, | Performed by: SURGERY

## 2024-09-25 NOTE — PROGRESS NOTES
VASCULAR SURGERY SERVICE    CHIEF COMPLAINT:  Functional left AV graft dysfunctional left AV graft    HISTORY OF PRESENT ILLNESS: Eva Bloom is a 58 y.o. female end-stage renal disease, with multiple serious medical comorbidities including admission for sepsis 2 months ago, EF 20%, AFib on Eliquis, who is having increasing bleeding after her dialysis runs.  I placed a revision, left AV graft with long interposition Accu Seal 12/28/2022.  She has had uninterrupted use of this graft ever since.    Recent weeks she is had increased bleeding after dialysis sticks    S/p:    1aaa.  Excision, left AV graft for infection 9/27/2023 ([facility]) (07/21/2023, Dr. Werner)  1aa. Reclosure, L AVG conter-incision 5/1/2023  1a. PTA, L AVG 4/19/2023  Excision, excluded L AVG and permacath 1/25/2023  revision, left AV graft with long interposition Accu Seal 12/28/2022  Original left AV graft 2017 with subsequent covered stent placement in venous outflow    09/1/2023:  She now returns after excision of the left AV graft for infection proximally 5 weeks ago.  She is been dialyzing through a PermCath.    09/22/2023:  Now returns with breakdown of prior counter incision for graft excision.  Notably, she has metastatic renal cell carcinoma to the lungs    10/20/2023:  She now returns after excision of the left AV graft remnant.  She is continued to deteriorate with weight loss, now BMI 13, was recently hospitalized for her declining state, has now been put on hospice.  This point she is still dialyzing through a PermCath    2/2024:  referred by Dr. Farooq for L 3rd toe wound.  Pt has resumed Oncologic care after stopping hospice care and desires to proceed with ulcer healing treatment.    4/2024:  +wound care with healing wounds.    7/2024:  stronger, wounds improving, HD via catheter without issues.    9/2024:  walking with walker, HD going well.  Wound care without signs of infection.    Past Medical History:   Diagnosis Date     Anemia     Anticoagulant long-term use     Atrial fibrillation     Bronchitis 03/01/2017    Cancer 2016    kidney cancer    CHF (congestive heart failure), NYHA class II, chronic, systolic     CMV (cytomegalovirus) antibody positive     CVA (cerebral vascular accident) 1/3/2024    Encounter for blood transfusion     ESRD (end stage renal disease)     Essential hypertension 09/23/2015    H/O herpes simplex type 2 infection     Herpes simplex type 1 antibody positive     History of kidney cancer     s/p left nephrectomy 1/2016    Hyperparathyroidism, unspecified     Hyperuricemia without signs of inflammatory arthritis and tophaceous disease     Kidney stones     LGSIL (low grade squamous intraepithelial dysplasia)     Myocardiopathy 07/21/2017    Prediabetes     Proteinuria     Renal disorder     Thyroid nodule     Urate nephropathy        Past Surgical History:   Procedure Laterality Date    BREAST CYST EXCISION      COLONOSCOPY N/A 11/12/2015    COLONOSCOPY N/A 03/12/2021    Procedure: COLONOSCOPY;  Surgeon: Brendon Lanier MD;  Location: [facility];  Service: Endoscopy;  Laterality: N/A;  covid test 3/9, labs prior, prep instr mailed -ml    EXCISION OF ARTERIOVENOUS FISTULA Left 09/27/2023    Procedure: EXCISION, AV FISTULA;  Surgeon: FARIDEH Muñoz III, MD;  [facility];  Service: Vascular;  Laterality: Left;  LUE AV graft excision    INSERTION OF BIVENTRICULAR IMPLANTABLE CARDIOVERTER-DEFIBRILLATOR (ICD)  04/2021    INSERTION OF TUNNELED CENTRAL VENOUS CATHETER (CVC) WITH SUBCUTANEOUS PORT N/A 01/25/2023    Procedure: INSERTION, DUAL LUMEN CATHETER WITH PORT, WITH IMAGING GUIDANCE;  Surgeon: FARIDEH Muñoz III, MD;  [facility];  Service: Peripheral Vascular;  Laterality: N/A;  possinble permcath placment    NEPHRECTOMY-LAPAROSCOPIC Left 01/12/2016    PERCUTANEOUS TRANSLUMINAL ANGIOPLASTY OF ARTERIOVENOUS FISTULA Left 04/19/2023    Procedure: PTA, AV FISTULA;  Surgeon: FARIDEH CHEN  Toni SAM MD;  Location: [facility] CATH LAB;  Service: Peripheral Vascular;  Laterality: Left;    PERITONEAL CATHETER INSERTION      Permacath insertion  01/12/2017    PLACEMENT OF ARTERIOVENOUS GRAFT  12/28/2022    Procedure: INSERTION, GRAFT, ARTERIOVENOUS;  Surgeon: FARIDEH Muñoz III, MD;  Location: [facility] OR [facility] F[facility];  Service: Peripheral Vascular;;    REMOVAL, GRAFT, ARTERIOVENOUS, UPPER EXTREMITY Left 01/25/2023    Procedure: REMOVAL, GRAFT, ARTERIOVENOUS, UPPER EXTREMITY;  Surgeon: FARIDEH Muñoz III, MD;  Location: [facility] OR [facility] FLR;  Service: Peripheral Vascular;  Laterality: Left;    REVISION OF ARTERIOVENOUS FISTULA Left 05/01/2023    Procedure: REVISION, AV FISTULA;  Surgeon: FARIDEH Muñoz III, MD;  [facility];  Service: Peripheral Vascular;  Laterality: Left;  LUE AVG revision    REVISION OF PROCEDURE INVOLVING ARTERIOVENOUS GRAFT Left 12/28/2022    Procedure: REVISION, PROCEDURE INVOLVING ARTERIOVENOUS GRAFT;  Surgeon: FARIDEH Muñoz III, MD;  [facility];  Service: Peripheral Vascular;  Laterality: Left;    REVISION OF PROCEDURE INVOLVING ARTERIOVENOUS GRAFT Left 07/21/2023    Procedure: LEFT ARM ARTERIOVENOUS GRAFT EXCISION  AND LIGATION;  Surgeon: Obi Werner MD;  Location: [facility] OR;  Service: Vascular;  Laterality: Left;  Left AVG excision and revision with interposition    SALPINGOOPHORECTOMY Right 2016    KJB---DAVINCI    TONSILLECTOMY      TUBAL LIGATION           Current Outpatient Medications:     acetaminophen (TYLENOL) 500 MG tablet, Take 1 tablet (500 mg total) by mouth every 4 (four) hours as needed for Pain or Temperature greater than (100.5 or greater)., Disp: 30 tablet, Rfl: 0    acetaminophen-codeine 300-60mg (TYLENOL #4) 300-60 mg Tab, Take 1 tablet by mouth nightly as needed (foot pain)., Disp: 30 tablet, Rfl: 0    albuterol-ipratropium (DUO-NEB) 2.5 mg-0.5 mg/3 mL nebulizer solution, Take by nebulization., Disp: , Rfl:     apixaban  (ELIQUIS) 2.5 mg Tab, Take 1 tablet (2.5 mg total) by mouth 2 (two) times daily., Disp: 60 tablet, Rfl: 5    aspirin (ECOTRIN) 81 MG EC tablet, Take 1 tablet (81 mg total) by mouth once daily., Disp: 90 tablet, Rfl: 2    atorvastatin (LIPITOR) 40 MG tablet, Take 1 tablet (40 mg total) by mouth once daily., Disp: 90 tablet, Rfl: 3    bisacodyL (DULCOLAX) 10 mg Supp, Place 10 mg rectally., Disp: , Rfl:     epoetin david-epbx (RETACRIT INJ), , Disp: , Rfl:     hyoscyamine (LEVSIN/SL) 0.125 mg Subl, Place under the tongue., Disp: , Rfl:     lactulose (CHRONULAC) 10 gram/15 mL solution, Take 15 mLs (10 g total) by mouth 3 (three) times daily as needed (constipation)., Disp: 473 mL, Rfl: 0    lanthanum (FOSRENOL) 1000 MG chewable tablet, Take 1 tablet by mouth., Disp: , Rfl:     levothyroxine (SYNTHROID) 75 MCG tablet, Take 1 tablet (75 mcg total) by mouth once daily., Disp: 90 tablet, Rfl: 2    lidocaine-prilocaine (EMLA) cream, APPLY ATLEAST 30 MINUTES BEFORE TREATMENT 3 TIMES A WEEK, Disp: 30 g, Rfl: 11    LORAZEPAM INTENSOL 2 mg/mL Conc, Take by mouth., Disp: , Rfl:     metoprolol succinate (TOPROL-XL) 25 MG 24 hr tablet, Take 1 tablet (25 mg total) by mouth once daily., Disp: 30 tablet, Rfl: 11    mirtazapine (REMERON) 7.5 MG Tab, Take 1 tablet (7.5 mg total) by mouth every evening., Disp: 90 tablet, Rfl: 2    mv,Ca,min-folic acid-vit K1 (ONE-A-DAY WOMEN'S 50 PLUS) 400-20 mcg Tab, Take 1 tablet by mouth once daily., Disp: , Rfl:     naloxone (NARCAN) 1 mg/mL injection, 2 mg (1 mg per nostril) by Nasal route as needed for opioid overdose; may repeat in 3 to 5 minutes if not effective. Call 911, Disp: 2 mL, Rfl: 11    nystatin (MYCOSTATIN) cream, SMARTSIG:sparingly Topical 2-4 Times Daily PRN, Disp: , Rfl:     ondansetron (ZOFRAN-ODT) 4 MG TbDL, Take by mouth., Disp: , Rfl:     pantoprazole (PROTONIX) 40 MG tablet, Take 1 tablet (40 mg total) by mouth 2 (two) times daily., Disp: 60 tablet, Rfl: 11    acetaminophen  (TYLENOL) 500 MG tablet, Take 500 mg by mouth every 6 (six) hours as needed for Pain. (Patient not taking: Reported on 9/25/2024), Disp: , Rfl:     cabozantinib (CABOMETYX) 60 mg Tab, TAKE ONE TABLET BY MOUTH ONCE DAILY AT THE SAME TIME ON AN EMPTY STOMACH AT LEAST 1 HOUR BEFORE OR 2 HOURS AFTER EATING. AVOID GRAPEFRUIT PRODUCTS (Patient not taking: Reported on 9/25/2024), Disp: 30 tablet, Rfl: 4  No current facility-administered medications for this visit.    Facility-Administered Medications Ordered in Other Visits:     0.9%  NaCl infusion, , Intravenous, Continuous, Soni Bhatti MD, New Bag at 07/21/23 1116    Review of patient's allergies indicates:   Allergen Reactions    Carvedilol Other (See Comments)     Nausea/vomiting    Allopurinol      Other reaction(s): abnormal transaminases       Family History   Problem Relation Name Age of Onset    Hypertension Mother      Diabetes Father      Kidney disease Father      No Known Problems Sister Sophy     Heart disease Sister Maria A     No Known Problems Sister Claudine     No Known Problems Brother Dax     No Known Problems Brother Don     Cataracts Maternal Aunt      No Known Problems Maternal Uncle      No Known Problems Paternal Aunt      No Known Problems Paternal Uncle      No Known Problems Maternal Grandmother      Diabetes Maternal Grandfather      No Known Problems Paternal Grandmother      No Known Problems Paternal Grandfather      No Known Problems Son      No Known Problems Son      No Known Problems Other      Breast cancer Neg Hx      Colon cancer Neg Hx      Cancer Neg Hx      Stroke Neg Hx      Amblyopia Neg Hx      Blindness Neg Hx      Glaucoma Neg Hx      Macular degeneration Neg Hx      Retinal detachment Neg Hx      Strabismus Neg Hx      Thyroid disease Neg Hx         Social History     Tobacco Use    Smoking status: Never     Passive exposure: Never    Smokeless tobacco: Never   Substance Use Topics    Alcohol use: No    Drug use: Yes      "Types: Hydrocodone       PHYSICAL EXAM:   /63 (BP Location: Right arm, Patient Position: Sitting)   Pulse 83   Ht 5' 4" (1.626 m)   Wt 48.1 kg (106 lb)   LMP 10/24/2016   BMI 18.19 kg/m²   Constitutional:  She appears very frail and deconditioned   Neurological: Normal speech  no focal findings  CN II - XII grossly intact.    Psychiatric: Mood and affect appropriate and symmetric.   HEENT: Normocephalic / atraumatic  PERRLA  Midline trachea  No scars across the neck   Cardiac: Regular rate and rhythm.   Pulmonary: Normal pulmonary effort.   Abdomen: Soft, not distended.     Skin: Warm and well perfused.    Vascular:  Radial pulses are nonpalpable bilaterally.   Extremities/  Musculoskeletal: No edema.   Right arm inc well healed, bilateral toes and L heel with edema and dry ischemic changes       IMAGIN/2024  Right lower extremity pressures and waveforms indicate moderate to severe arterial occlusive disease.  Left lower extremity pressures and waveforms indicate moderate to severe arterial occlusive disease.  Ankle pressures are non-compressible indicating possible medial calcinosis.     Left lower extremity arterial ultrasound shows decreased external iliac artery flow indicating proximal stenosis with decreased distal femoropopliteal artery flow, heavily calcified arteries, an occluded PT and peroneal arteries.     IMPRESSION:   Status post excision, left AV graft remnant.    Patient's medical status continued to deteriorate and now off home hospice  AFib on Eliquis  Severe systolic dysfunction EF 20%  Metastatic renal cell to lung    PLAN  -NUVIA / TP show moderate to severe arterial occlusive disease s/p L AT/PT PTA 2023  -Wound care recs  -F/u 3 mo, sooner pending Onc prognosis     I spent 11 minutes evaluating this patient and greater than 50% of the time was spent counseling, coordinator care and discussing the plan of care.  All questions were answered and patient stated understanding with " agreement with the above treatment plan.    Corby Casey M.D., R.P.[facility]

## 2024-10-01 ENCOUNTER — EXTERNAL HOME HEALTH (OUTPATIENT)
Dept: HOME HEALTH SERVICES | Facility: HOSPITAL | Age: 59
End: 2024-10-01
Payer: MEDICARE

## 2024-10-01 DIAGNOSIS — I73.9 PAD (PERIPHERAL ARTERY DISEASE): Primary | ICD-10-CM

## 2024-10-04 ENCOUNTER — TELEPHONE (OUTPATIENT)
Dept: VASCULAR SURGERY | Facility: CLINIC | Age: 59
End: 2024-10-04
Payer: MEDICARE

## 2024-10-04 NOTE — TELEPHONE ENCOUNTER
Spoke with  and said [place]25/2024 [place] [facility] [place]

## 2024-10-04 NOTE — TELEPHONE ENCOUNTER
----- Message from Corby Casey MD sent at 10/1/2024 12:32 PM CDT -----  Please ask if [place] [facility] [place] Message -----  From: Emy Jorgensen LPN  Sent: 9/30/2024   4:37 PM CDT  To: Corby Casey MD    Called and spoke with pt. . States 10/31 is not going to work to do her procedure. She goes to dialysis on T/Th/Sat. Need to have procedure done on a M/W/F. He said home health did not come on Friday to change her drsg. I told him to call the company and let them know and see when are they coming to do pt.dressing change. Pt.verbalized understanding  ----- Message -----  From: Corby Casey MD  Sent: 9/30/2024   3:28 PM CDT  To: Emy Jorgensen LPN    Please call patient and  and ask if 10/31/24 works for her angiogram, let them know Dr. Rivera stated her prognosis was appropriate and I can do angiogram of her RLE first to evaluate ability to help her wounds receive better blood flow.  ----- Message -----  From: Ben Rivera MD  Sent: 9/25/2024  12:17 PM CDT  To: Corby Casey MD    Thanks for reaching out.  To be honest, I had recommended hospice a while back for her.  She ultimately decided against it because she wouldn't be able to continue dialysis.  She's off therapy now, and has lived longer than I expected.  Her cancer has been relatively stable, which is surprising and good.  I expect that she could survive several months and maybe even a year or a little more, but I doubt it will be years.  I hope this helps.  Thank you.      Ben  ----- Message -----  From: Corby Casey MD  Sent: 9/25/2024   9:04 AM CDT  To: Ben Rivera MD    Good morning,     I am managing her foot wounds and am evaluating her for angiography/revascularization.  Could you provide me with her prognosis and your thoughts on the behavior of her cancer, if you believe it could remain stable for several months to years, or if her prognosis is poor.   I appreciate your expertise and collaboration in her care.    Sean Casey

## 2024-10-09 ENCOUNTER — OFFICE VISIT (OUTPATIENT)
Dept: FAMILY MEDICINE | Facility: CLINIC | Age: 59
End: 2024-10-09
Payer: MEDICARE

## 2024-10-09 VITALS
BODY MASS INDEX: 18.27 KG/M2 | SYSTOLIC BLOOD PRESSURE: 124 MMHG | OXYGEN SATURATION: 99 % | HEART RATE: 77 BPM | HEIGHT: 64 IN | DIASTOLIC BLOOD PRESSURE: 70 MMHG | TEMPERATURE: 98 F | WEIGHT: 107 LBS

## 2024-10-09 DIAGNOSIS — G89.29 CHRONIC LEFT SHOULDER PAIN: Primary | ICD-10-CM

## 2024-10-09 DIAGNOSIS — M25.512 CHRONIC LEFT SHOULDER PAIN: Primary | ICD-10-CM

## 2024-10-09 PROCEDURE — 99213 OFFICE O/P EST LOW 20 MIN: CPT | Mod: PBBFAC,PO | Performed by: FAMILY MEDICINE

## 2024-10-09 PROCEDURE — 99999 PR PBB SHADOW E&M-EST. PATIENT-LVL III: CPT | Mod: PBBFAC,,, | Performed by: FAMILY MEDICINE

## 2024-10-10 ENCOUNTER — TELEPHONE (OUTPATIENT)
Dept: VASCULAR SURGERY | Facility: CLINIC | Age: 59
End: 2024-10-10
Payer: MEDICARE

## 2024-10-10 NOTE — TELEPHONE ENCOUNTER
----- Message from Corby Casey MD sent at 10/10/2024  2:07 PM CDT -----  Regarding: RE: orders  No new orders from us.  Recommend wound care center referral if she is not already seeing them.  ----- Message -----  From: Ofe Lee MA  Sent: 10/2/2024   9:58 AM CDT  To: Corby Casey MD  Subject: FW: orders                                       Please advise  ----- Message -----  From: Kadnis Loera  Sent: 10/2/2024   9:53 AM CDT  To: Carmela Tavarez Staff  Subject: orders                                           Name of caller: Mando ( home health nurse )       What is the requesting detail: requesting a call back to find out if there for orders for wound care.Please advise       Can the clinic reply by [facility]:       What number to call back:[phone]

## 2024-10-14 ENCOUNTER — HOSPITAL ENCOUNTER (OUTPATIENT)
Dept: WOUND CARE | Facility: HOSPITAL | Age: 59
Discharge: HOME | End: 2024-10-14
Attending: INTERNAL MEDICINE
Payer: MEDICARE

## 2024-10-14 ENCOUNTER — EXTERNAL HOME HEALTH (OUTPATIENT)
Dept: HOME HEALTH SERVICES | Facility: HOSPITAL | Age: 59
End: 2024-10-14
Payer: MEDICARE

## 2024-10-14 VITALS
SYSTOLIC BLOOD PRESSURE: 157 MMHG | DIASTOLIC BLOOD PRESSURE: 84 MMHG | HEART RATE: 88 BPM | TEMPERATURE: 97 F | RESPIRATION RATE: 14 BRPM

## 2024-10-14 DIAGNOSIS — I50.42 CHRONIC COMBINED SYSTOLIC AND DIASTOLIC CONGESTIVE HEART FAILURE: ICD-10-CM

## 2024-10-14 DIAGNOSIS — R00.0 TACHYCARDIA: ICD-10-CM

## 2024-10-14 DIAGNOSIS — L97.529 ISCHEMIC TOE ULCER, LEFT, WITH UNSPECIFIED SEVERITY: ICD-10-CM

## 2024-10-14 DIAGNOSIS — L89.893 PRESSURE ULCER OF RIGHT LEG, STAGE 3: ICD-10-CM

## 2024-10-14 DIAGNOSIS — L97.429: Primary | ICD-10-CM

## 2024-10-14 DIAGNOSIS — I48.0 PAROXYSMAL ATRIAL FIBRILLATION: ICD-10-CM

## 2024-10-14 PROCEDURE — 99214 OFFICE O/P EST MOD 30 MIN: CPT | Mod: GW,,, | Performed by: INTERNAL MEDICINE

## 2024-10-14 PROCEDURE — 99213 OFFICE O/P EST LOW 20 MIN: CPT | Performed by: INTERNAL MEDICINE

## 2024-10-14 RX ORDER — METOPROLOL SUCCINATE 25 MG/1
25 TABLET, EXTENDED RELEASE ORAL DAILY
Qty: 90 TABLET | Refills: 0 | Status: SHIPPED | OUTPATIENT
Start: 2024-10-14

## 2024-10-14 NOTE — TELEPHONE ENCOUNTER
----- Message from Freida sent at 10/14/2024  3:11 PM CDT -----  Regarding: refill  Type: RX Refill Request     Who Called:Otis, spouse      RX Name and Strength:metoprolol succinate (TOPROL-XL) 25 MG 24 hr tablet      Preferred Pharmacy with phone number:[facility]/pharmacy #[id number] - [place] - [address]   Phone: [phone]       Would the patient rather a call back or a response via [facility]?call     Best Call Back Number:[phone]      Additional Information: also requesting a call to schedule pt annual, Epic not showing any availability

## 2024-10-14 NOTE — PROGRESS NOTES
[facility]                Progress Note    Subjective:       Patient ID: Eva Bloom is a 59 y.o. female.    Chief Complaint: Dressing Change, Wound Care, and Wound Check    Follow up wound care visit. Patient brought to exam room in wheelchair, by . Patient is wearing Darco to the Right and Left Foot. Patient was able to ambulate from the wheelchair to the exam chair with  at side. Pt denies fever, chills, nausea/vomiting, or flu-like symptoms at present; c/o pain to wound beds to left foot at present. Dressing to the Right Lateral Thigh is intact, with no strike though drainage.  reports Home Health came out as ordered. Dressing removed and wounds cleansed by RN. No changes to drsg order; applied per MD order. Pt  denied AVS.    Presents with  for follow up of ischemic ulcers of bilateral lower extremities.  continues to paint toes and heel daily with betadine. She is scheduled to have lower extremity angiogram Oct 25th with dr. Werner. She is tolerating full sessions of hemodialysis without hypotension. Feet still with resting pain on oral asa and clistazol no drainage or bleeding from wounds       Review of Systems      Objective:        Physical Exam  Constitutional:       Appearance: She is ill-appearing.      Comments: cachectic   Pulmonary:      Effort: Pulmonary effort is normal. No respiratory distress.   Musculoskeletal:      Right lower leg: No edema.      Left lower leg: No edema.   Skin:     Comments: Right lateral thigh wound remains closed with firm ovelrying fibrin no fluctuance    Heel with firm eschar no expressible discharge    Right distal second toe with circumferential dried eschar (dry gangrene) no fluctuance or discharge   Neurological:      Mental Status: She is alert.         Vitals:    10/14/24 0830   BP: (!) 157/84   Pulse: 88   Resp: 14   Temp: 96.8 °F (36 °C)       Assessment:            ICD-10-CM ICD-9-CM   1. Ischemic ulcer of heel, left, with unspecified severity  L97.429 707.14   2. Ischemic toe ulcer, left, with unspecified severity  L97.529 707.15   3. Pressure ulcer of right leg, stage 3  L89.893 707.09     707.23            (Retired) Altered Skin Integrity 11/01/23 2027 Right lateral Thigh Ulceration (Active)   11/01/23 2027   Altered Skin Integrity Present on Admission - Did Patient arrive to the hospital with altered skin?: yes   Side: Right   Orientation: lateral   Location: Thigh   Wound Number:    Is this injury device related?:    Primary Wound Type: Ulceration   Description of Altered Skin Integrity:    Ankle-Brachial Index:    Pulses:    Removal Indication and Assessment: removed per policy   Wound Outcome:    (Retired) Wound Length (cm):    (Retired) Wound Width (cm):    (Retired) Depth (cm):    Wound Description (Comments):    Removal Indications:    Wound Image   10/14/24 0833   Description of Altered Skin Integrity Full thickness tissue loss. Subcutaneous fat may be visible but bone, tendon or muscle are not exposed 10/14/24 0833   Dressing Appearance Dry;Intact;Clean 10/14/24 0833   Drainage Amount None 10/14/24 0833   Appearance Fibrin;Dry 10/14/24 0833   Tissue loss description Full thickness 10/14/24 0833   Periwound Area Intact;Dry 10/14/24 0833   Wound Edges Open;Defined 10/14/24 0833   Wound Length (cm) 0.2 cm 10/14/24 0833   Wound Width (cm) 0.3 cm 10/14/24 0833   Wound Depth (cm) 0 cm 10/14/24 0833   Wound Volume (cm^3) 0 cm^3 10/14/24 0833   Wound Surface Area (cm^2) 0.06 cm^2 10/14/24 0833   Care Cleansed with:;Soap and water;Sterile normal saline 10/14/24 0833   Dressing Applied 10/14/24 0833   Periwound Care Dry periwound area maintained 10/14/24 0833   Off Loading Off loading shoe 10/14/24 0833   Dressing Change Due 10/21/24 10/14/24 0833            Altered Skin Integrity 02/22/24 0930 Left dorsal Toe, third Ulceration (Active)   02/22/24 0930   Altered Skin  Integrity Present on Admission - Did Patient arrive to the hospital with altered skin?: yes   Side: Left   Orientation: dorsal   Location: Toe, third   Wound Number:    Is this injury device related?:    Primary Wound Type: Ulceration   Description of Altered Skin Integrity:    Ankle-Brachial Index:    Pulses:    Removal Indication and Assessment:    Wound Outcome:    (Retired) Wound Length (cm):    (Retired) Wound Width (cm):    (Retired) Depth (cm):    Wound Description (Comments):    Removal Indications:    Wound Image   10/14/24 0846   Dressing Appearance No dressing;Open to air 10/14/24 0846   Drainage Amount None 10/14/24 0846   Appearance Yellow;Dry;Eschar 10/14/24 0846   Tissue loss description Full thickness 10/14/24 0846   Black (%), Wound Tissue Color 10 % 10/14/24 0846   Yellow (%), Wound Tissue Color 90 % 10/14/24 0846   Periwound Area Intact;Dry 10/14/24 0846   Wound Edges Open;Defined 10/14/24 0846   Wound Length (cm) 1.4 cm 10/14/24 0846   Wound Width (cm) 1.4 cm 10/14/24 0846   Wound Depth (cm) 0 cm 10/14/24 0846   Wound Volume (cm^3) 0 cm^3 10/14/24 0846   Wound Surface Area (cm^2) 1.96 cm^2 10/14/24 0846   Care Cleansed with:;Soap and water;Sterile normal saline 10/14/24 0846   Dressing Applied 10/14/24 0846   Periwound Care Dry periwound area maintained 10/14/24 0846   Off Loading Off loading shoe 10/14/24 0846   Dressing Change Due 10/21/24 10/14/24 0846            Altered Skin Integrity 02/22/24 0930 Left plantar Heel Ulceration (Active)   02/22/24 0930   Altered Skin Integrity Present on Admission - Did Patient arrive to the hospital with altered skin?:    Side: Left   Orientation: plantar   Location: Heel   Wound Number:    Is this injury device related?:    Primary Wound Type: Ulceration   Description of Altered Skin Integrity:    Ankle-Brachial Index:    Pulses:    Removal Indication and Assessment:    Wound Outcome:    (Retired) Wound Length (cm):    (Retired) Wound Width (cm):     (Retired) Depth (cm):    Wound Description (Comments):    Removal Indications:    Wound Image   10/14/24 0850   Description of Altered Skin Integrity Full thickness tissue loss. Subcutaneous fat may be visible but bone, tendon or muscle are not exposed 10/14/24 0850   Dressing Appearance Open to air;No dressing 10/14/24 0850   Drainage Amount Small 10/14/24 0850   Drainage Characteristics/Odor Serosanguineous 10/14/24 0850   Appearance Eschar;Slough;Dry 10/14/24 0850   Tissue loss description Full thickness 10/14/24 0850   Black (%), Wound Tissue Color 80 % 10/14/24 0850   Red (%), Wound Tissue Color 10 % 10/14/24 0850   Yellow (%), Wound Tissue Color 10 % 10/14/24 0850   Periwound Area Intact;Dry 10/14/24 0850   Wound Edges Open;Defined 10/14/24 0850   Wound Length (cm) 3.4 cm 10/14/24 0850   Wound Width (cm) 3.6 cm 10/14/24 0850   Wound Depth (cm) 0.2 cm 10/14/24 0850   Wound Volume (cm^3) 2.448 cm^3 10/14/24 0850   Wound Surface Area (cm^2) 12.24 cm^2 10/14/24 0850   Care Cleansed with:;Soap and water;Sterile normal saline 10/14/24 0850   Dressing Applied 10/14/24 0850   Periwound Care Dry periwound area maintained 10/14/24 0850   Off Loading Off loading shoe 10/14/24 0850   Dressing Change Due 10/21/24 10/14/24 0850            Wound 07/22/24 Arterial Ulcer Left dorsal Toe, second (Active)   07/22/24    Present on Original Admission: Y   Primary Wound Type: Arterial ulc   Side: Left   Orientation: dorsal   Location: Toe, second   Wound Approximate Age at First Assessment (Weeks):    Wound Number:    Is this injury device related?:    Incision Type:    Closure Method:    Wound Description (Comments):    Type:    Additional Comments:    Ankle-Brachial Index:    Pulses:    Removal Indication and Assessment:    Wound Outcome:    Wound Image   10/14/24 0852   Dressing Appearance Open to air;No dressing 10/14/24 0852   Drainage Amount None 10/14/24 0852   Appearance Eschar;Dry 10/14/24 6707   Tissue loss description  Full thickness 10/14/24 0852   Black (%), Wound Tissue Color 100 % 10/14/24 0852   Periwound Area Intact;Dry 10/14/24 0852   Care Cleansed with:;Soap and water;Sterile normal saline 10/14/24 0852   Dressing Applied 10/14/24 0852   Periwound Care Dry periwound area maintained 10/14/24 0852   Off Loading Off loading shoe 10/14/24 0852   Dressing Change Due 10/21/24 10/14/24 0852            Wound 07/22/24 Arterial Ulcer Right distal Toe, first (Active)   07/22/24    Present on Original Admission: Y   Primary Wound Type: Arterial ulc   Side: Right   Orientation: distal   Location: Toe, first   Wound Approximate Age at First Assessment (Weeks):    Wound Number:    Is this injury device related?:    Incision Type:    Closure Method:    Wound Description (Comments):    Type:    Additional Comments:    Ankle-Brachial Index:    Pulses:    Removal Indication and Assessment:    Wound Outcome:    Wound Image   10/14/24 0853   Dressing Appearance Open to air;No dressing 10/14/24 0853   Drainage Amount Scant 10/14/24 0853   Drainage Characteristics/Odor Serosanguineous 10/14/24 0853   Appearance Yellow;Slough;Eschar;Dry 10/14/24 0853   Tissue loss description Full thickness 10/14/24 0853   Black (%), Wound Tissue Color 70 % 10/14/24 0853   Red (%), Wound Tissue Color 10 % 10/14/24 0853   Yellow (%), Wound Tissue Color 20 % 10/14/24 0853   Periwound Area Intact;Dry 10/14/24 0853   Wound Edges Open;Defined 10/14/24 0853   Wound Length (cm) 1.2 cm 10/14/24 0853   Wound Width (cm) 1.1 cm 10/14/24 0853   Wound Depth (cm) 0.3 cm 10/14/24 0853   Wound Volume (cm^3) 0.396 cm^3 10/14/24 0853   Wound Surface Area (cm^2) 1.32 cm^2 10/14/24 0853   Care Cleansed with:;Soap and water;Sterile normal saline 10/14/24 0853   Dressing Applied 10/14/24 0853   Periwound Care Dry periwound area maintained 10/14/24 0853   Off Loading Off loading shoe 10/14/24 0853   Dressing Change Due 10/21/24 10/14/24 0853            Wound 10/14/24 0815 Ulceration  Left plantar Toe, second (Active)   10/14/24 0815   Present on Original Admission:    Primary Wound Type: Ulceration   Side: Left   Orientation: plantar   Location: Toe, second   Wound Approximate Age at First Assessment (Weeks):    Wound Number:    Is this injury device related?:    Incision Type:    Closure Method:    Wound Description (Comments):    Type:    Additional Comments:    Ankle-Brachial Index:    Pulses:    Removal Indication and Assessment:    Wound Outcome:    Wound Image   10/14/24 0855   Dressing Appearance Open to air;No dressing 10/14/24 0855   Drainage Amount None 10/14/24 0855   Appearance Black;Eschar;Dry 10/14/24 0855   Tissue loss description Full thickness 10/14/24 0855   Black (%), Wound Tissue Color 100 % 10/14/24 0855   Periwound Area Intact;Dry 10/14/24 0855   Wound Edges Open;Defined 10/14/24 0855   Wound Length (cm) 0.8 cm 10/14/24 0855   Wound Width (cm) 0.4 cm 10/14/24 0855   Wound Depth (cm) 0.2 cm 10/14/24 0855   Wound Volume (cm^3) 0.064 cm^3 10/14/24 0855   Wound Surface Area (cm^2) 0.32 cm^2 10/14/24 0855   Care Cleansed with:;Soap and water;Sterile normal saline 10/14/24 0855   Dressing Applied 10/14/24 0855   Periwound Care Dry periwound area maintained 10/14/24 0855   Off Loading Off loading shoe 10/14/24 0855   Dressing Change Due 10/21/24 10/14/24 0855           Plan:          Wounds stable dry gangrene without evidence of abscess or active infection. Continue betadine painting daily. Offloading/floating heels when in bed. On DAPT. Continhe daily dressing changes and wound check to have angiogram for hopeful improved perfusion to help wounds due to her metastatic cancer she is NOT a canidate for hyperbaric treatments return for wound check in three weeks    Tissue pathology and/or culture taken     [] Yes      [x] No  Sharp debridement performed                   [] Yes       [x] No  Labs ordered     [] Yes       [x] No  Imaging ordered    [] Yes      [x] No    Orders  Placed This Encounter   Procedures    Change dressing     Wound care orders    For right lateral thigh wound:  Dressing change frequency once a week  Remove old dressing.  Cleanse: Normal Saline  Protect periwound with:  Cavilon  Primary dressing: Paint wound bed with betadine  Secondary dressing: Silicone border dressing to cover    Left 1st-3rd Toes, Right 1st toe, and Left heel:  Clean with Normal Saline  Patient will paint with betadine daily. Leave open to air.    SUBSEQUENT HOME HEALTH ORDERS     Home Health for Wound Care. Visit twice a week. Patient will return to clinic on 11/04/24      Wound Dressing Orders    For right lateral thigh wound:  Dressing change frequency once a week or prn  Remove old dressing.  Cleanse or irrigate with: Normal Saline  Protect periwound with:  Cavilon  Primary dressing: betadine iodine to wound bed  Secondary dressing: Cover with silicone border dressing (Mepilex, Foam Lite Border, etc)    For Right 1st Dital Toe, Left 1st-3rd Toes and Left heel.  Patient is using betadine to treat wounds on foot, daily. Leave open to air. Please provide supplies for daily changes.    Evaluate for acute changes (purulence, increased redness/swelling, increased drainage, malodor, increased pain, pallor, necrosis) please contact physician on any acute changes.    DO NOT DEVIATE FROM ORDER. PLEASE ORDER SUPPLIES NEED TO APPLY TO PATIENT'S WOUNDS. Call [facility] at [phone] if there are any questions about dressing application or substitutions.     Order Specific Question:   What Home Health Agency is the patient currently using?     Answer:   Other/External        Follow up in about 3 weeks (around 11/4/2024) for Wound Care.

## 2024-10-17 NOTE — PROGRESS NOTES
Assessment & Plan  Problem List Items Addressed This Visit    None  Visit Diagnoses       Chronic left shoulder pain    -  Primary           Assessment & Plan  1. Left shoulder pain.  The pain has been present for about a month and is described as shooting and dull, with sharp episodes. It is exacerbated by certain movements and when lying down. Given her metastatic cancer and recent fall in September, an x-ray of the left shoulder will be ordered to rule out any fractures or other issues. She is advised to continue taking Tylenol 500 mg as needed, ensuring the total daily dose does not exceed 3000 mg. Massage therapy and the use of a handheld massager or Icy  are recommended for pain management. Physical therapy was discussed but declined.        Results        Health Maintenance reviewed.      ______________________________________________________________________    Chief Complaint  Chief Complaint   Patient presents with    Shoulder Pain     Left shoulder pain couple of weeks ago        HPI  Eva Bloom is a 59 y.o. female with multiple medical diagnoses as listed in the medical history and problem list that presents for shoulder pain.  Pt is known to me with last appointment Visit date not found .    History of Present Illness  The patient presents for evaluation of left shoulder pain. She is accompanied by her .    She reports experiencing pain in her left shoulder, which she describes as both sharp and dull. The pain has been present for approximately a month. She has been managing the pain with Tylenol, taking up to 2000 mg daily, which provides some relief. She also mentions discomfort when lying down and requires assistance from her  to get up. She recalls a fall in September 2023, prior to the onset of the shoulder pain. A recent CAT scan did not reveal any abnormalities in the shoulder. She has declined physical therapy but is open to having an x-ray. Her  has been  applying a generic brand of Icy Hot to her shoulder for additional relief.    She is on dialysis 3 days a week. She had her left kidney taken out in 01/2016. She sees Dr. Cavanaugh at [facility] for cancer. She also had problems with her foot and sees Dr. Jones from wound care and Dr. Tai, vascular surgeon. She takes codeine as needed for her feet.           PAST MEDICAL HISTORY:  Past Medical History:   Diagnosis Date    Anemia     Anticoagulant long-term use     Atrial fibrillation     Bronchitis 03/01/2017    Cancer 2016    kidney cancer    CHF (congestive heart failure), NYHA class II, chronic, systolic     CMV (cytomegalovirus) antibody positive     CVA (cerebral vascular accident) 1/3/2024    Encounter for blood transfusion     ESRD (end stage renal disease)     Essential hypertension 09/23/2015    H/O herpes simplex type 2 infection     Herpes simplex type 1 antibody positive     History of kidney cancer     s/p left nephrectomy 1/2016    Hyperparathyroidism, unspecified     Hyperuricemia without signs of inflammatory arthritis and tophaceous disease     Kidney stones     LGSIL (low grade squamous intraepithelial dysplasia)     Myocardiopathy 07/21/2017    Prediabetes     Proteinuria     Renal disorder     Thyroid nodule     Urate nephropathy        PAST SURGICAL HISTORY:  Past Surgical History:   Procedure Laterality Date    BREAST CYST EXCISION      COLONOSCOPY N/A 11/12/2015    COLONOSCOPY N/A 03/12/2021    Procedure: COLONOSCOPY;  Surgeon: Brendon Lanier MD;  Location: [facility];  Service: Endoscopy;  Laterality: N/A;  covid test 3/9, labs prior, prep instr mailed -ml    EXCISION OF ARTERIOVENOUS FISTULA Left 09/27/2023    Procedure: EXCISION, AV FISTULA;  Surgeon: FARIDEH Muñoz III, MD;  Location: [facility] OR [address] [place] [address];  Service: Vascular;  Laterality: Left;  LUE AV graft excision    INSERTION OF BIVENTRICULAR IMPLANTABLE CARDIOVERTER-DEFIBRILLATOR (ICD)  04/2021    INSERTION OF TUNNELED  CENTRAL VENOUS CATHETER (CVC) WITH SUBCUTANEOUS PORT N/A 01/25/2023    Procedure: INSERTION, DUAL LUMEN CATHETER WITH PORT, WITH IMAGING GUIDANCE;  Surgeon: FARIDEH Muñoz III, MD;  [facility];  Service: Peripheral Vascular;  Laterality: N/A;  possinble permcath placment    NEPHRECTOMY-LAPAROSCOPIC Left 01/12/2016    PERCUTANEOUS TRANSLUMINAL ANGIOPLASTY OF ARTERIOVENOUS FISTULA Left 04/19/2023    Procedure: PTA, AV FISTULA;  Surgeon: FARIDEH Muñoz III, MD;  Location: [facility] CATH LAB;  Service: Peripheral Vascular;  Laterality: Left;    PERITONEAL CATHETER INSERTION      Permacath insertion  01/12/2017    PLACEMENT OF ARTERIOVENOUS GRAFT  12/28/2022    Procedure: INSERTION, GRAFT, ARTERIOVENOUS;  Surgeon: FARIDEH Muñoz III, MD;  [facility];  Service: Peripheral Vascular;;    REMOVAL, GRAFT, ARTERIOVENOUS, UPPER EXTREMITY Left 01/25/2023    Procedure: REMOVAL, GRAFT, ARTERIOVENOUS, UPPER EXTREMITY;  Surgeon: FARIDEH Muñoz III, MD;  [facility];  Service: Peripheral Vascular;  Laterality: Left;    REVISION OF ARTERIOVENOUS FISTULA Left 05/01/2023    Procedure: REVISION, AV FISTULA;  Surgeon: FARIDEH Muñoz III, MD;  [facility]: [facility];  Service: Peripheral Vascular;  Laterality: Left;  LUE AVG revision    REVISION OF PROCEDURE INVOLVING ARTERIOVENOUS GRAFT Left 12/28/2022    Procedure: REVISION, PROCEDURE INVOLVING ARTERIOVENOUS GRAFT;  Surgeon: FARIDEH Muñoz III, MD;  [facility];  Service: Peripheral Vascular;  Laterality: Left;    REVISION OF PROCEDURE INVOLVING ARTERIOVENOUS GRAFT Left 07/21/2023    Procedure: LEFT ARM ARTERIOVENOUS GRAFT EXCISION  AND LIGATION;  Surgeon: Obi Werner MD;  Location: [facility];  Service: Vascular;  Laterality: Left;  Left AVG excision and revision with interposition    SALPINGOOPHORECTOMY Right 2016    KJB---DAVINCI    TONSILLECTOMY      TUBAL LIGATION         SOCIAL HISTORY:  Social History      Socioeconomic History    Marital status:     Number of children: 2   Occupational History    Occupation: lucero     Employer: [id number]   Tobacco Use    Smoking status: Never     Passive exposure: Never    Smokeless tobacco: Never   Substance and Sexual Activity    Alcohol use: No    Drug use: Yes     Types: Hydrocodone    Sexual activity: Not Currently     Social Drivers of Health     Financial Resource Strain: Low Risk  (9/28/2023)    Overall Financial Resource Strain (CARDIA)     Difficulty of Paying Living Expenses: Not hard at all   Food Insecurity: No Food Insecurity (9/28/2023)    Hunger Vital Sign     Worried About Running Out of Food in the Last Year: Never true     Ran Out of Food in the Last Year: Never true   Transportation Needs: No Transportation Needs (9/28/2023)    PRAPARE - Transportation     Lack of Transportation (Medical): No     Lack of Transportation (Non-Medical): No   Physical Activity: Inactive (9/28/2023)    Exercise Vital Sign     Days of Exercise per Week: 0 days     Minutes of Exercise per Session: 0 min   Stress: No Stress Concern Present (9/28/2023)    [employer] [place] [employer] - Occupational Stress Questionnaire     Feeling of Stress : Not at all   Housing Stability: Low Risk  (9/28/2023)    Housing Stability Vital Sign     Unable to Pay for Housing in the Last Year: No     Number of Places Lived in the Last Year: 1     Unstable Housing in the Last Year: No       FAMILY HISTORY:  Family History   Problem Relation Name Age of Onset    Hypertension Mother      Diabetes Father      Kidney disease Father      No Known Problems Sister Sophy     Heart disease Sister Maria A     No Known Problems Sister Claudine     No Known Problems Brother Dax     No Known Problems Brother Don     Cataracts Maternal Aunt      No Known Problems Maternal Uncle      No Known Problems Paternal Aunt      No Known Problems Paternal Uncle      No Known Problems Maternal  Grandmother      Diabetes Maternal Grandfather      No Known Problems Paternal Grandmother      No Known Problems Paternal Grandfather      No Known Problems Son      No Known Problems Son      No Known Problems Other      Breast cancer Neg Hx      Colon cancer Neg Hx      Cancer Neg Hx      Stroke Neg Hx      Amblyopia Neg Hx      Blindness Neg Hx      Glaucoma Neg Hx      Macular degeneration Neg Hx      Retinal detachment Neg Hx      Strabismus Neg Hx      Thyroid disease Neg Hx         ALLERGIES AND MEDICATIONS: updated and reviewed.  Review of patient's allergies indicates:   Allergen Reactions    Carvedilol Other (See Comments)     Nausea/vomiting    Allopurinol      Other reaction(s): abnormal transaminases     Current Outpatient Medications   Medication Sig Dispense Refill    acetaminophen (TYLENOL) 500 MG tablet Take 500 mg by mouth every 6 (six) hours as needed for Pain.      acetaminophen (TYLENOL) 500 MG tablet Take 1 tablet (500 mg total) by mouth every 4 (four) hours as needed for Pain or Temperature greater than (100.5 or greater). 30 tablet 0    acetaminophen-codeine 300-60mg (TYLENOL #4) 300-60 mg Tab Take 1 tablet by mouth nightly as needed (foot pain). 30 tablet 0    albuterol-ipratropium (DUO-NEB) 2.5 mg-0.5 mg/3 mL nebulizer solution Take by nebulization.      apixaban (ELIQUIS) 2.5 mg Tab Take 1 tablet (2.5 mg total) by mouth 2 (two) times daily. 60 tablet 5    aspirin (ECOTRIN) 81 MG EC tablet Take 1 tablet (81 mg total) by mouth once daily. 90 tablet 2    atorvastatin (LIPITOR) 40 MG tablet Take 1 tablet (40 mg total) by mouth once daily. 90 tablet 3    bisacodyL (DULCOLAX) 10 mg Supp Place 10 mg rectally.      cabozantinib (CABOMETYX) 60 mg Tab TAKE ONE TABLET BY MOUTH ONCE DAILY AT THE SAME TIME ON AN EMPTY STOMACH AT LEAST 1 HOUR BEFORE OR 2 HOURS AFTER EATING. AVOID GRAPEFRUIT PRODUCTS 30 tablet 4    epoetin david-epbx (RETACRIT INJ)       hyoscyamine (LEVSIN/SL) 0.125 mg Subl Place under  the tongue.      lactulose (CHRONULAC) 10 gram/15 mL solution Take 15 mLs (10 g total) by mouth 3 (three) times daily as needed (constipation). 473 mL 0    lanthanum (FOSRENOL) 1000 MG chewable tablet Take 1 tablet by mouth.      levothyroxine (SYNTHROID) 75 MCG tablet Take 1 tablet (75 mcg total) by mouth once daily. 90 tablet 2    lidocaine-prilocaine (EMLA) cream APPLY ATLEAST 30 MINUTES BEFORE TREATMENT 3 TIMES A WEEK 30 g 11    LORAZEPAM INTENSOL 2 mg/mL Conc Take by mouth.      mirtazapine (REMERON) 7.5 MG Tab Take 1 tablet (7.5 mg total) by mouth every evening. 90 tablet 2    mv,Ca,min-folic acid-vit K1 (ONE-A-DAY WOMEN'S 50 PLUS) 400-20 mcg Tab Take 1 tablet by mouth once daily.      naloxone (NARCAN) 1 mg/mL injection 2 mg (1 mg per nostril) by Nasal route as needed for opioid overdose; may repeat in 3 to 5 minutes if not effective. Call 911 2 mL 11    nystatin (MYCOSTATIN) cream SMARTSIG:sparingly Topical 2-4 Times Daily PRN      ondansetron (ZOFRAN-ODT) 4 MG TbDL Take by mouth.      pantoprazole (PROTONIX) 40 MG tablet Take 1 tablet (40 mg total) by mouth 2 (two) times daily. 60 tablet 11    metoprolol succinate (TOPROL-XL) 25 MG 24 hr tablet Take 1 tablet (25 mg total) by mouth once daily. 90 tablet 0     No current facility-administered medications for this visit.     Facility-Administered Medications Ordered in Other Visits   Medication Dose Route Frequency Provider Last Rate Last Admin    0.9%  NaCl infusion   Intravenous Continuous Soni Bhatti MD   New Bag at 07/21/23 1116         ROS  Review of Systems   Constitutional:  Negative for activity change, appetite change, fatigue, fever and unexpected weight change.   HENT: Negative.  Negative for ear discharge, ear pain, rhinorrhea and sore throat.    Eyes: Negative.    Respiratory:  Negative for apnea, cough, chest tightness, shortness of breath and wheezing.    Cardiovascular:  Negative for chest pain, palpitations and leg swelling.  "  Gastrointestinal:  Negative for abdominal distention, abdominal pain, constipation, diarrhea and vomiting.   Endocrine: Negative for cold intolerance, heat intolerance, polydipsia and polyuria.   Genitourinary:  Negative for decreased urine volume and urgency.   Musculoskeletal:  Positive for arthralgias and myalgias.   Skin:  Negative for rash.   Neurological:  Negative for dizziness and headaches.   Hematological:  Does not bruise/bleed easily.   Psychiatric/Behavioral:  Negative for agitation, sleep disturbance and suicidal ideas.            Physical Exam  Vitals:    10/09/24 1006   BP: 124/70   Pulse: 77   Temp: 97.8 °F (36.6 °C)   TempSrc: Oral   SpO2: 99%   Weight: 48.5 kg (107 lb)   Height: 5' 4" (1.626 m)    Body mass index is 18.37 kg/m².  Weight: 48.5 kg (107 lb)   Height: 5' 4" (162.6 cm)   Physical Exam  Vitals reviewed.   Constitutional:       Appearance: She is cachectic. She is ill-appearing.   HENT:      Head: Normocephalic and atraumatic.      Right Ear: External ear normal.      Left Ear: External ear normal.      Nose: Nose normal.      Mouth/Throat:      Mouth: Mucous membranes are moist.      Pharynx: Oropharynx is clear.   Eyes:      Extraocular Movements: Extraocular movements intact.      Conjunctiva/sclera: Conjunctivae normal.      Pupils: Pupils are equal, round, and reactive to light.   Cardiovascular:      Rate and Rhythm: Normal rate and regular rhythm.      Heart sounds: Normal heart sounds.   Pulmonary:      Effort: Pulmonary effort is normal.      Breath sounds: Normal breath sounds.   Skin:     General: Skin is warm and dry.   Neurological:      Mental Status: She is alert and oriented to person, place, and time.        Physical Exam           Health Maintenance         Date Due Completion Date    Hemoglobin A1c 08/25/2024 8/25/2023    COVID-19 Vaccine (7 - 2024-25 season) 09/01/2024 11/11/2021    RSV Vaccine (Age 60+ and Pregnant patients) (1 - Risk 60-74 years 1-dose series) " Never done ---    Mammogram 12/08/2024 12/8/2023    Lipid Panel 05/01/2025 5/1/2024    TETANUS VACCINE 09/23/2025 9/23/2015    Colorectal Cancer Screening 03/12/2028 3/12/2021    Pneumococcal Vaccines (Age 0-64) (4 of 4 - PPSV23 or PCV20) 10/01/2029 11/9/2016                Patient note was created using [employer].  Any errors in syntax or even information may not have been identified and edited on initial review prior to signing this note.

## 2024-10-25 ENCOUNTER — HOSPITAL ENCOUNTER (OUTPATIENT)
Facility: HOSPITAL | Age: 59
Discharge: HOME | End: 2024-10-25
Attending: SURGERY | Admitting: SURGERY
Payer: MEDICARE

## 2024-10-25 ENCOUNTER — ANESTHESIA (OUTPATIENT)
Dept: SURGERY | Facility: HOSPITAL | Age: 59
End: 2024-10-25
Payer: MEDICARE

## 2024-10-25 ENCOUNTER — ANESTHESIA EVENT (OUTPATIENT)
Dept: SURGERY | Facility: HOSPITAL | Age: 59
End: 2024-10-25
Payer: MEDICARE

## 2024-10-25 VITALS
RESPIRATION RATE: 18 BRPM | WEIGHT: 111 LBS | BODY MASS INDEX: 19.05 KG/M2 | DIASTOLIC BLOOD PRESSURE: 85 MMHG | TEMPERATURE: 98 F | HEART RATE: 82 BPM | SYSTOLIC BLOOD PRESSURE: 131 MMHG | OXYGEN SATURATION: 96 %

## 2024-10-25 DIAGNOSIS — L97.429: Primary | ICD-10-CM

## 2024-10-25 DIAGNOSIS — I70.229 CRITICAL LOWER LIMB ISCHEMIA: ICD-10-CM

## 2024-10-25 DIAGNOSIS — I70.223 CRITICAL LIMB ISCHEMIA OF BOTH LOWER EXTREMITIES: ICD-10-CM

## 2024-10-25 PROCEDURE — 25000003 PHARM REV CODE 250

## 2024-10-25 PROCEDURE — 37000008 HC ANESTHESIA 1ST 15 MINUTES: Performed by: SURGERY

## 2024-10-25 PROCEDURE — 71000015 HC POSTOP RECOV 1ST HR: Performed by: SURGERY

## 2024-10-25 PROCEDURE — 36000706: Performed by: SURGERY

## 2024-10-25 PROCEDURE — 36000707: Performed by: SURGERY

## 2024-10-25 PROCEDURE — 37000009 HC ANESTHESIA EA ADD 15 MINS: Performed by: SURGERY

## 2024-10-25 PROCEDURE — 63600175 PHARM REV CODE 636 W HCPCS

## 2024-10-25 RX ORDER — CEFAZOLIN 2 G/1
2 INJECTION, POWDER, FOR SOLUTION INTRAMUSCULAR; INTRAVENOUS ONCE
Status: DISCONTINUED | OUTPATIENT
Start: 2024-10-25 | End: 2024-10-25 | Stop reason: HOSPADM

## 2024-10-25 RX ORDER — GLUCAGON 1 MG
1 KIT INJECTION
Status: DISCONTINUED | OUTPATIENT
Start: 2024-10-25 | End: 2024-10-25 | Stop reason: HOSPADM

## 2024-10-25 RX ORDER — SODIUM CHLORIDE 0.9 % (FLUSH) 0.9 %
10 SYRINGE (ML) INJECTION
Status: DISCONTINUED | OUTPATIENT
Start: 2024-10-25 | End: 2024-10-25 | Stop reason: HOSPADM

## 2024-10-25 RX ORDER — CEFAZOLIN 2 G/1
2 INJECTION, POWDER, FOR SOLUTION INTRAMUSCULAR; INTRAVENOUS
Status: DISCONTINUED | OUTPATIENT
Start: 2024-10-25 | End: 2024-10-25 | Stop reason: SDUPTHER

## 2024-10-25 RX ORDER — MIDAZOLAM HYDROCHLORIDE 1 MG/ML
INJECTION INTRAMUSCULAR; INTRAVENOUS
Status: DISCONTINUED | OUTPATIENT
Start: 2024-10-25 | End: 2024-10-25 | Stop reason: HOSPADM

## 2024-10-25 RX ORDER — CEFAZOLIN 2 G/1
2 INJECTION, POWDER, FOR SOLUTION INTRAMUSCULAR; INTRAVENOUS
OUTPATIENT
Start: 2024-10-25

## 2024-10-25 RX ORDER — LIDOCAINE HYDROCHLORIDE 10 MG/ML
1 INJECTION, SOLUTION EPIDURAL; INFILTRATION; INTRACAUDAL; PERINEURAL ONCE
OUTPATIENT
Start: 2024-10-25 | End: 2024-10-25

## 2024-10-25 RX ORDER — CEFAZOLIN SODIUM 1 G/3ML
INJECTION, POWDER, FOR SOLUTION INTRAMUSCULAR; INTRAVENOUS
Status: DISCONTINUED | OUTPATIENT
Start: 2024-10-25 | End: 2024-10-25 | Stop reason: HOSPADM

## 2024-10-25 RX ORDER — SODIUM CHLORIDE 0.9 % (FLUSH) 0.9 %
10 SYRINGE (ML) INJECTION
OUTPATIENT
Start: 2024-10-25

## 2024-10-25 RX ORDER — LIDOCAINE HYDROCHLORIDE 10 MG/ML
1 INJECTION, SOLUTION EPIDURAL; INFILTRATION; INTRACAUDAL; PERINEURAL ONCE
Status: DISCONTINUED | OUTPATIENT
Start: 2024-10-25 | End: 2024-10-25 | Stop reason: HOSPADM

## 2024-10-25 RX ORDER — FENTANYL CITRATE 50 UG/ML
INJECTION, SOLUTION INTRAMUSCULAR; INTRAVENOUS
Status: DISCONTINUED | OUTPATIENT
Start: 2024-10-25 | End: 2024-10-25 | Stop reason: HOSPADM

## 2024-10-25 RX ADMIN — FENTANYL CITRATE 25 MCG: 50 INJECTION INTRAMUSCULAR; INTRAVENOUS at 01:10

## 2024-10-25 RX ADMIN — SODIUM CHLORIDE, SODIUM GLUCONATE, SODIUM ACETATE, POTASSIUM CHLORIDE, MAGNESIUM CHLORIDE, SODIUM PHOSPHATE, DIBASIC, AND POTASSIUM PHOSPHATE: .53; .5; .37; .037; .03; .012; .00082 INJECTION, SOLUTION INTRAVENOUS at 12:10

## 2024-10-25 RX ADMIN — MIDAZOLAM 1 MG: 1 INJECTION INTRAMUSCULAR; INTRAVENOUS at 01:10

## 2024-10-25 RX ADMIN — CEFAZOLIN 2 G: 330 INJECTION, POWDER, FOR SOLUTION INTRAMUSCULAR; INTRAVENOUS at 01:10

## 2024-10-28 LAB
GLUCOSE SERPL-MCNC: 65 MG/DL (ref 70–110)
HCO3 UR-SCNC: 22 MMOL/L (ref 24–28)
HCT VFR BLD CALC: 41 %PCV (ref 36–54)
PCO2 BLDA: 28.3 MMHG (ref 35–45)
PH SMN: 7.5 [PH] (ref 7.35–7.45)
PO2 BLDA: 159 MMHG (ref 40–60)
POC BE: -1 MMOL/L
POC IONIZED CALCIUM: 0.77 MMOL/L (ref 1.06–1.42)
POC SATURATED O2: 100 % (ref 95–100)
POC TCO2: 23 MMOL/L (ref 24–29)
POTASSIUM BLD-SCNC: 4.5 MMOL/L (ref 3.5–5.1)
SAMPLE: ABNORMAL
SODIUM BLD-SCNC: 137 MMOL/L (ref 136–145)

## 2024-10-30 ENCOUNTER — OFFICE VISIT (OUTPATIENT)
Dept: FAMILY MEDICINE | Facility: CLINIC | Age: 59
End: 2024-10-30
Payer: MEDICARE

## 2024-10-30 VITALS
HEART RATE: 84 BPM | HEIGHT: 64 IN | SYSTOLIC BLOOD PRESSURE: 118 MMHG | WEIGHT: 106 LBS | BODY MASS INDEX: 18.1 KG/M2 | DIASTOLIC BLOOD PRESSURE: 74 MMHG | TEMPERATURE: 98 F | OXYGEN SATURATION: 96 %

## 2024-10-30 DIAGNOSIS — Z00.00 ROUTINE MEDICAL EXAM: Primary | ICD-10-CM

## 2024-10-30 DIAGNOSIS — L97.529: ICD-10-CM

## 2024-10-30 DIAGNOSIS — Z86.73 HISTORY OF CVA (CEREBROVASCULAR ACCIDENT): ICD-10-CM

## 2024-10-30 DIAGNOSIS — E43 SEVERE PROTEIN-CALORIE MALNUTRITION: ICD-10-CM

## 2024-10-30 DIAGNOSIS — R53.81 DEBILITY: ICD-10-CM

## 2024-10-30 DIAGNOSIS — R00.0 TACHYCARDIA: ICD-10-CM

## 2024-10-30 DIAGNOSIS — Z23 NEED FOR COVID-19 VACCINE: ICD-10-CM

## 2024-10-30 DIAGNOSIS — L97.429: ICD-10-CM

## 2024-10-30 DIAGNOSIS — N18.6 ESRD (END STAGE RENAL DISEASE) ON DIALYSIS: Chronic | ICD-10-CM

## 2024-10-30 DIAGNOSIS — Z99.2 ESRD (END STAGE RENAL DISEASE) ON DIALYSIS: Chronic | ICD-10-CM

## 2024-10-30 DIAGNOSIS — I48.0 PAF (PAROXYSMAL ATRIAL FIBRILLATION): ICD-10-CM

## 2024-10-30 DIAGNOSIS — Z12.31 ENCOUNTER FOR SCREENING MAMMOGRAM FOR BREAST CANCER: ICD-10-CM

## 2024-10-30 DIAGNOSIS — G47.00 INSOMNIA, UNSPECIFIED TYPE: ICD-10-CM

## 2024-10-30 DIAGNOSIS — N25.81 HYPERPARATHYROIDISM, SECONDARY RENAL: ICD-10-CM

## 2024-10-30 DIAGNOSIS — E03.9 HYPOTHYROIDISM, UNSPECIFIED TYPE: ICD-10-CM

## 2024-10-30 DIAGNOSIS — I50.42 CHRONIC COMBINED SYSTOLIC AND DIASTOLIC CONGESTIVE HEART FAILURE: ICD-10-CM

## 2024-10-30 DIAGNOSIS — I48.0 PAROXYSMAL ATRIAL FIBRILLATION: ICD-10-CM

## 2024-10-30 PROBLEM — I50.22 CHRONIC SYSTOLIC HEART FAILURE: Status: RESOLVED | Noted: 2024-07-22 | Resolved: 2024-10-30

## 2024-10-30 PROBLEM — T82.7XXA ARTERIOVENOUS GRAFT INFECTION: Chronic | Status: RESOLVED | Noted: 2023-01-24 | Resolved: 2024-10-30

## 2024-10-30 PROBLEM — D62 ACUTE BLOOD LOSS ANEMIA: Status: RESOLVED | Noted: 2023-11-01 | Resolved: 2024-10-30

## 2024-10-30 PROBLEM — T82.41XA HEMODIALYSIS CATHETER DYSFUNCTION: Status: RESOLVED | Noted: 2024-06-07 | Resolved: 2024-10-30

## 2024-10-30 PROBLEM — E74.39 GLUCOSE INTOLERANCE: Status: RESOLVED | Noted: 2022-04-18 | Resolved: 2024-10-30

## 2024-10-30 PROBLEM — R40.0 SOMNOLENCE: Status: RESOLVED | Noted: 2024-04-15 | Resolved: 2024-10-30

## 2024-10-30 PROBLEM — T68.XXXA HYPOTHERMIA: Status: RESOLVED | Noted: 2023-10-12 | Resolved: 2024-10-30

## 2024-10-30 PROBLEM — G93.41 ACUTE METABOLIC ENCEPHALOPATHY: Status: RESOLVED | Noted: 2024-01-02 | Resolved: 2024-10-30

## 2024-10-30 PROBLEM — R41.82 ALTERED MENTAL STATUS: Status: RESOLVED | Noted: 2024-04-12 | Resolved: 2024-10-30

## 2024-10-30 PROCEDURE — 99215 OFFICE O/P EST HI 40 MIN: CPT | Mod: PBBFAC,PO,25 | Performed by: INTERNAL MEDICINE

## 2024-10-30 PROCEDURE — 99999 PR PBB SHADOW E&M-EST. PATIENT-LVL V: CPT | Mod: PBBFAC,,, | Performed by: INTERNAL MEDICINE

## 2024-10-30 PROCEDURE — 99999PBSHW PR PBB SHADOW TECHNICAL ONLY FILED TO HB: Mod: PBBFAC,,,

## 2024-10-30 PROCEDURE — 90480 ADMN SARSCOV2 VAC 1/ONLY CMP: CPT | Mod: PBBFAC,PO

## 2024-10-30 PROCEDURE — 91320 SARSCV2 VAC 30MCG TRS-SUC IM: CPT | Mod: PBBFAC,PO

## 2024-10-30 RX ORDER — LEVOTHYROXINE SODIUM 75 UG/1
75 TABLET ORAL DAILY
Qty: 90 TABLET | Refills: 2 | Status: SHIPPED | OUTPATIENT
Start: 2024-10-30

## 2024-10-30 RX ORDER — MIRTAZAPINE 7.5 MG/1
7.5 TABLET, FILM COATED ORAL NIGHTLY
Qty: 90 TABLET | Refills: 2 | Status: SHIPPED | OUTPATIENT
Start: 2024-10-30

## 2024-10-30 RX ORDER — METOPROLOL SUCCINATE 25 MG/1
25 TABLET, EXTENDED RELEASE ORAL DAILY
Qty: 90 TABLET | Refills: 0 | Status: CANCELLED | OUTPATIENT
Start: 2024-10-30

## 2024-10-30 RX ORDER — ASPIRIN 81 MG/1
81 TABLET ORAL DAILY
Qty: 90 TABLET | Refills: 2 | Status: CANCELLED | OUTPATIENT
Start: 2024-10-30

## 2024-10-30 RX ADMIN — COVID-19 VACCINE, MRNA 0.3 ML: 0.04 INJECTION, SUSPENSION INTRAMUSCULAR at 03:10

## 2024-10-30 NOTE — TELEPHONE ENCOUNTER
No care due was identified.  [facility] Embedded Care Due Messages. Reference number: [id number].   10/30/2024 9:22:57 AM CDT

## 2024-10-31 ENCOUNTER — TELEPHONE (OUTPATIENT)
Dept: FAMILY MEDICINE | Facility: CLINIC | Age: 59
End: 2024-10-31
Payer: MEDICARE

## 2024-11-01 ENCOUNTER — TELEPHONE (OUTPATIENT)
Dept: VASCULAR SURGERY | Facility: CLINIC | Age: 59
End: 2024-11-01
Payer: MEDICARE

## 2024-11-01 RX ORDER — ATORVASTATIN CALCIUM 40 MG/1
40 TABLET, FILM COATED ORAL DAILY
Qty: 90 TABLET | Refills: 1 | Status: SHIPPED | OUTPATIENT
Start: 2024-11-01

## 2024-11-04 ENCOUNTER — TELEPHONE (OUTPATIENT)
Dept: WOUND CARE | Facility: HOSPITAL | Age: 59
End: 2024-11-04
Payer: MEDICARE

## 2024-11-04 ENCOUNTER — HOSPITAL ENCOUNTER (OUTPATIENT)
Dept: WOUND CARE | Facility: HOSPITAL | Age: 59
Discharge: HOME | End: 2024-11-04
Attending: INTERNAL MEDICINE
Payer: MEDICARE

## 2024-11-04 RX ORDER — METOPROLOL SUCCINATE 25 MG/1
25 TABLET, EXTENDED RELEASE ORAL DAILY
Qty: 90 TABLET | Refills: 0 | OUTPATIENT
Start: 2024-11-04

## 2024-11-04 RX ORDER — ASPIRIN 81 MG/1
81 TABLET ORAL DAILY
Qty: 90 TABLET | Refills: 2 | Status: SHIPPED | OUTPATIENT
Start: 2024-11-04

## 2024-11-04 NOTE — TELEPHONE ENCOUNTER
Talked with patient's  regarding wife appointment,  stated he forgot she had an appointment today at 0800 with Dr. Jones. Patient's  stated he will have to reschedule wife appointment after his wife have surgery on 11/11/2024. MD was notified.

## 2024-11-06 ENCOUNTER — OFFICE VISIT (OUTPATIENT)
Dept: ELECTROPHYSIOLOGY | Facility: CLINIC | Age: 59
End: 2024-11-06
Payer: MEDICARE

## 2024-11-06 ENCOUNTER — CLINICAL SUPPORT (OUTPATIENT)
Dept: CARDIOLOGY | Facility: HOSPITAL | Age: 59
End: 2024-11-06
Attending: INTERNAL MEDICINE
Payer: MEDICARE

## 2024-11-06 ENCOUNTER — TELEPHONE (OUTPATIENT)
Dept: CARDIOLOGY | Facility: HOSPITAL | Age: 59
End: 2024-11-06
Payer: MEDICARE

## 2024-11-06 ENCOUNTER — HOSPITAL ENCOUNTER (OUTPATIENT)
Dept: CARDIOLOGY | Facility: CLINIC | Age: 59
Discharge: HOME | End: 2024-11-06
Payer: MEDICARE

## 2024-11-06 VITALS
SYSTOLIC BLOOD PRESSURE: 116 MMHG | HEART RATE: 79 BPM | WEIGHT: 106 LBS | HEIGHT: 64 IN | DIASTOLIC BLOOD PRESSURE: 84 MMHG | BODY MASS INDEX: 18.1 KG/M2

## 2024-11-06 DIAGNOSIS — I42.8 CARDIOMYOPATHY, NONISCHEMIC: ICD-10-CM

## 2024-11-06 DIAGNOSIS — Z99.2 ESRD (END STAGE RENAL DISEASE) ON DIALYSIS: Chronic | ICD-10-CM

## 2024-11-06 DIAGNOSIS — I27.20 PULMONARY HYPERTENSION: ICD-10-CM

## 2024-11-06 DIAGNOSIS — N18.6 ESRD (END STAGE RENAL DISEASE) ON DIALYSIS: Chronic | ICD-10-CM

## 2024-11-06 DIAGNOSIS — I42.8 CARDIOMYOPATHY, NONISCHEMIC: Chronic | ICD-10-CM

## 2024-11-06 DIAGNOSIS — I70.262 CRITICAL LIMB ISCHEMIA OF LEFT LOWER EXTREMITY WITH GANGRENE: ICD-10-CM

## 2024-11-06 DIAGNOSIS — I48.0 PAF (PAROXYSMAL ATRIAL FIBRILLATION): Chronic | ICD-10-CM

## 2024-11-06 DIAGNOSIS — I70.0 AORTIC ATHEROSCLEROSIS: Primary | ICD-10-CM

## 2024-11-06 LAB
OHS QRS DURATION: 102 MS
OHS QTC CALCULATION: 545 MS

## 2024-11-06 PROCEDURE — 93260 PRGRMG DEV EVAL IMPLTBL SYS: CPT

## 2024-11-06 PROCEDURE — 99214 OFFICE O/P EST MOD 30 MIN: CPT | Mod: PBBFAC,25 | Performed by: INTERNAL MEDICINE

## 2024-11-06 PROCEDURE — 93005 ELECTROCARDIOGRAM TRACING: CPT | Mod: PBBFAC | Performed by: INTERNAL MEDICINE

## 2024-11-06 PROCEDURE — 99999 PR PBB SHADOW E&M-EST. PATIENT-LVL IV: CPT | Mod: PBBFAC,,, | Performed by: INTERNAL MEDICINE

## 2024-11-06 NOTE — TELEPHONE ENCOUNTER
Patient is a former patient of Dr. Chisholm. With his departure, patient will be scheduled with Dr. Mac to establish care. Patient agrees to transition to Dr. Mac and agrees with appointment date and time.

## 2024-11-06 NOTE — PROGRESS NOTES
Subjective:   Patient ID:  Eva Bloom is a 59 y.o. female who presents for follow up of NICM, ICD    Ms. Bloom is a 59 y.o. female with NICM, HTN, PH, renal cell CA with ESRD on HD, S-ICD (4/2021), pAF here for follow up.    Background:  NICM  HTN on meds  PH  renal cell CA, with subsequent ESRD after nephrectomy. Gets HD via LUE T/R/Sat  [place] with stairs. No CP, no syncope.  echo 2/21 30%. mod PC effusion (chronic), 2+ MR  PET 2017 neg  NICM  Chronic PC effusion, likely related to ESRD  Will pursue S-ICD, which is a better option for her given young age, ESRD, and lack of pacing indication.  Will likely not perform DFT test. Discussed with pt, who understands and agrees.    4/19/2021: Successful implantation of ICD Sub Q placed for primary intervention.  7/20/2021: She is 3 mo s/p S-ICD implantation. Stable from a device perspective with stable lead and device function. No arrhythmia noted. Home monitor connected per pt.    7/25/2022: Device function stable. She is 1 mo s/p new onset symptomatic AF which converted back to SR after metoprolol. She was also found to be anemic after epistaxis which has resolved s/p cauterization. Did received 1 PRBC in hospital. She was started on eliquis but was unable to tolerate it due to side effects (severe diarrhea which resolved after cessation of medication). I discussed atrial fibrillation and its basic pathophysiology, including the health implications and treatment options with Eva Bloom. Specifically, I addressed the need for CVA prophylaxis as well as the goal to reduce symptomatic arrhythmic episodes by pharmacologic and/or procedural methods. We discussed options for anticoagulation, including coumadin vs dabigatran/rivaroxaban/apixaban. Discussed risks and benefits of each, including dosing, side effects, risk, and cost. Answered all questions. Patient verbalized understanding.  CHADSVASc 5 and OAC is recommended. Will start warfarin given ESRD. Pt also  given information to troubleshoot home monitoring. For now will have patient RTC in 3 mo to determine whether any rhythm control efforts are required.      9/14/2022: Patient reported stopping warfarin because it made her feel poorly. She was restarted on eliquis 2.5mg BID.    10/6/2022: Admission to observation for symptomatic anemia. Suspect anemia from blood lost from dialysis access few weeks ago. Outpatient HD unit was planning to increase LUMA. Received 2 units of PRBC overnight hgb  5.7>8.3.    10/29/2022: 57 year old female with ESRD presents to the ED to evaluate her worsening bilateral leg pain and weakness for 5 days.  Patient was hypothermic in ER with lactic acidosis.  Started on Bicarb drip and underwent HD.  Started on broad spectrum ABx's.  Elevated Troponin and Cardiology consulted.  Echo shows EF of 25%.  Denies any chest pain and no plans for intervention at this time.  US abdomen shows possible acute cholecystitis.  Surgery consulted.  HIDA unable to eval gallbladder due to poor hepatic clearance. No indication for cholecystostomy drain as pain has improved per Surgery.  PT/OT consulted.  Recommending Rehab.  SW consulted.  All cultures negative with no obvious source of infection and ABx's stopped.  Patient and  do not want Rehab and want to go home with HH.  She has remained afebrile and hemodynamically stable.  Elevated LFT's on admit have gradually improved.  Patient will be discharged home with HH and DME.  She will follow up with PCP.    12/19/2022: Patient was discharged this morning after going to the ED yesterday for hypotension. Was given fluid for dehydration thought secondary to diarrhea. Will refer to GI for diarrhea management. Will also temporarily reduce metoprolol. AV graft revision scheduled for 12/28/2022. On eliquis 2.5mg BID for CVA prophylaxis. ESRD on HD. Will follow up after procedure to assess for improvement of bleeding. Low AF burden with most recent AF episode  7/2022.    Update (11/06/2024):    2/16/2023: Graft infection - treated with abx  2/16/2023: Saw hem onc for management of metastatic RCC  4/19/2023: Left fistulogram and intervention     Earlier in 2024, long hospitalization; was on hospice (no longer). During hospice period, her S-ICD was turned off.    Remains on eliquis 2.5mg BID (renal function and wt). ESRD on HD.    Device Interrogation reveals an intrinsic SR with stable lead and device function.     I have personally reviewed the patient's EKG today, which shows sinus rhythm     Relevant Cardiac Test Results:    2D Echo (10/30/2022):  The left ventricle is mildly enlarged with moderate concentric hypertrophy and severely decreased systolic function.  The estimated ejection fraction is 25%.  Grade II left ventricular diastolic dysfunction.  Normal right ventricular size with normal right ventricular systolic function.  Moderate left atrial enlargement.  Moderate right atrial enlargement.  There is mild aortic valve stenosis.  Aortic valve area is 2.11 cm2; peak velocity is 2.11 m/s; mean gradient is 10 mmHg.  Moderate mitral regurgitation.  Moderate tricuspid regurgitation.  Elevated central venous pressure (15 mmHg).  The estimated PA systolic pressure is 60 mmHg.  There is pulmonary hypertension.  Small to moderate circumferential pericardial effusion. No tamponade    Current Outpatient Medications   Medication Sig    acetaminophen (TYLENOL) 500 MG tablet Take 500 mg by mouth every 6 (six) hours as needed for Pain.    apixaban (ELIQUIS) 2.5 mg Tab Take 1 tablet (2.5 mg total) by mouth 2 (two) times daily.    cabozantinib (CABOMETYX) 60 mg Tab TAKE ONE TABLET BY MOUTH ONCE DAILY AT THE SAME TIME ON AN EMPTY STOMACH AT LEAST 1 HOUR BEFORE OR 2 HOURS AFTER EATING. AVOID GRAPEFRUIT PRODUCTS    cloNIDine 0.3 mg/24 hr td ptwk (CATAPRES) 0.3 mg/24 hr Place 1 patch onto the skin every 7 days.    gabapentin (NEURONTIN) 100 MG capsule Take 1 capsule (100 mg total)  by mouth once daily.    hydrALAZINE (APRESOLINE) 100 MG tablet Take 1 tablet (100 mg total) by mouth every 12 (twelve) hours.    HYDROcodone-acetaminophen (NORCO) 5-325 mg per tablet Take 1 tablet by mouth every 4 (four) hours as needed for Pain. Causes drowsiness. Do not drive or operate machinery with this medication. Do not drink alcohol with this medication. Causes constipation. Take with stool softener.    levothyroxine (SYNTHROID) 75 MCG tablet Take 1 tablet (75 mcg total) by mouth once daily.    lidocaine-prilocaine (EMLA) cream APPLY ATLEAST 30 MINUTES BEFORE TREATMENT 3 TIMES A WEEK    metoprolol succinate (TOPROL-XL) 100 MG 24 hr tablet Take 1/2 tablet (50mg) once daily    mv,Ca,min-folic acid-vit K1 (ONE-A-DAY WOMEN'S 50 PLUS) 400-20 mcg Tab Take 1 tablet by mouth once daily.    naloxone (NARCAN) 1 mg/mL injection 2 mg (1 mg per nostril) by Nasal route as needed for opioid overdose; may repeat in 3 to 5 minutes if not effective. Call 911    oxyCODONE (OXY-IR) 5 mg Cap Take 1 capsule (5 mg total) by mouth every 6 (six) hours as needed for Pain.    polyethylene glycol (GLYCOLAX) 17 gram/dose powder Take 17 g by mouth once daily.    promethazine (PHENERGAN) 25 MG tablet Take 1 tablet (25 mg total) by mouth every 6 (six) hours as needed for Nausea.    sacubitriL-valsartan (ENTRESTO) 49-51 mg per tablet Take 1 tablet by mouth 2 (two) times daily.     No current facility-administered medications for this visit.       Review of Systems   Constitutional: Negative for malaise/fatigue.   Cardiovascular:  Negative for chest pain, dyspnea on exertion, irregular heartbeat, leg swelling and palpitations.   Respiratory:  Negative for shortness of breath.    Hematologic/Lymphatic: Positive for bleeding problem.   Skin:  Negative for rash.   Musculoskeletal:  Negative for myalgias.   Gastrointestinal:  Negative for hematemesis, hematochezia and nausea.   Genitourinary:  Negative for hematuria.   Neurological:  Negative  "for light-headedness.   Psychiatric/Behavioral:  Negative for altered mental status.    Allergic/Immunologic: Negative for persistent infections.       Objective:          /84 (Patient Position: Sitting)   Pulse 79   Ht 5' 4" (1.626 m)   Wt 48.1 kg (106 lb)   LMP 10/24/2016   BMI 18.19 kg/m²     Physical Exam  Vitals and nursing note reviewed.   Constitutional:       Appearance: Normal appearance. She is well-developed.   HENT:      Head: Normocephalic.      Nose: Nose normal.   Eyes:      Pupils: Pupils are equal, round, and reactive to light.   Cardiovascular:      Rate and Rhythm: Normal rate and regular rhythm.   Pulmonary:      Effort: No respiratory distress.      Breath sounds: Normal breath sounds.   Abdominal:      Comments: ICD to flank   Musculoskeletal:         General: Normal range of motion.   Skin:     General: Skin is warm and dry.      Findings: No erythema.      Comments: Left fistula   Neurological:      Mental Status: She is alert and oriented to person, place, and time.   Psychiatric:         Speech: Speech normal.         Behavior: Behavior normal.           Lab Results   Component Value Date     09/16/2024    K 3.7 09/16/2024    MG 2.4 04/13/2024    BUN 61 (H) 09/16/2024    CREATININE 6.0 (H) 09/16/2024    ALT 27 09/16/2024    AST 47 (H) 09/16/2024    HGB 9.7 (L) 10/29/2024    HGB 8.4 (L) 09/16/2024    HCT 41 10/25/2024    TSH 3.157 04/15/2024    LDLCALC 30.2 (L) 05/01/2024       Recent Labs   Lab 03/05/24  1858 04/13/24  1056 04/13/24  1104 06/07/24  0920   INR 1.2 1.4 H 1.1 1.1       Assessment:     1. Aortic atherosclerosis    2. Cardiomyopathy, nonischemic    3. Critical limb ischemia of left lower extremity with gangrene    4. PAF (paroxysmal atrial fibrillation)    5. Pulmonary hypertension    6. ESRD (end stage renal disease) on dialysis        Plan:     In summary, Ms. Bloom is a 59 y.o. female with NICM, HTN, PH, renal cell CA with ESRD on HD, S-ICD (4/2021), pAF " here for follow up.    General cardiology appt.  After a discussion with pt and  today, in light of improvement since her period of hospice, she chooses to have SICD tachy therapies reactivated today. They both are aware that this decision could be reversed at any time in the future if/when desired.    f/u in device clinic  f/u in EP clinic 1 year

## 2024-11-07 LAB — OHS CV DC REMOTE DEVICE TYPE: NORMAL

## 2024-11-11 ENCOUNTER — DOCUMENTATION ONLY (OUTPATIENT)
Dept: CARDIOLOGY | Facility: HOSPITAL | Age: 59
End: 2024-11-11
Payer: MEDICARE

## 2024-11-11 ENCOUNTER — ANESTHESIA (OUTPATIENT)
Dept: SURGERY | Facility: HOSPITAL | Age: 59
End: 2024-11-11
Payer: MEDICARE

## 2024-11-11 ENCOUNTER — ANESTHESIA EVENT (OUTPATIENT)
Dept: SURGERY | Facility: HOSPITAL | Age: 59
End: 2024-11-11
Payer: MEDICARE

## 2024-11-11 ENCOUNTER — HOSPITAL ENCOUNTER (OUTPATIENT)
Facility: HOSPITAL | Age: 59
Discharge: HOME | End: 2024-11-11
Attending: SURGERY | Admitting: SURGERY
Payer: MEDICARE

## 2024-11-11 VITALS
HEART RATE: 73 BPM | RESPIRATION RATE: 17 BRPM | BODY MASS INDEX: 18.11 KG/M2 | HEIGHT: 64 IN | DIASTOLIC BLOOD PRESSURE: 69 MMHG | SYSTOLIC BLOOD PRESSURE: 114 MMHG | TEMPERATURE: 98 F | WEIGHT: 106.06 LBS | OXYGEN SATURATION: 95 %

## 2024-11-11 VITALS — HEART RATE: 85 BPM

## 2024-11-11 DIAGNOSIS — N18.6 ESRD (END STAGE RENAL DISEASE): ICD-10-CM

## 2024-11-11 DIAGNOSIS — I73.9 PAD (PERIPHERAL ARTERY DISEASE): Primary | ICD-10-CM

## 2024-11-11 PROCEDURE — 25000003 PHARM REV CODE 250

## 2024-11-11 PROCEDURE — 63600175 PHARM REV CODE 636 W HCPCS: Performed by: SURGERY

## 2024-11-11 PROCEDURE — 71000044 HC DOSC ROUTINE RECOVERY FIRST HOUR: Performed by: SURGERY

## 2024-11-11 PROCEDURE — 25000003 PHARM REV CODE 250: Performed by: NURSE ANESTHETIST, CERTIFIED REGISTERED

## 2024-11-11 PROCEDURE — 37000008 HC ANESTHESIA 1ST 15 MINUTES: Performed by: SURGERY

## 2024-11-11 PROCEDURE — 27201423 OPTIME MED/SURG SUP & DEVICES STERILE SUPPLY: Performed by: SURGERY

## 2024-11-11 PROCEDURE — 75710 ARTERY X-RAYS ARM/LEG: CPT | Mod: 26,59,GC, | Performed by: SURGERY

## 2024-11-11 PROCEDURE — 71000015 HC POSTOP RECOV 1ST HR: Performed by: SURGERY

## 2024-11-11 PROCEDURE — C1760 CLOSURE DEV, VASC: HCPCS | Performed by: SURGERY

## 2024-11-11 PROCEDURE — 63600175 PHARM REV CODE 636 W HCPCS

## 2024-11-11 PROCEDURE — 37228 PR TIB/PER REVASC W/TLA: CPT | Mod: RT,GC,, | Performed by: SURGERY

## 2024-11-11 PROCEDURE — 63600175 PHARM REV CODE 636 W HCPCS: Performed by: NURSE ANESTHETIST, CERTIFIED REGISTERED

## 2024-11-11 PROCEDURE — 36000706: Performed by: SURGERY

## 2024-11-11 PROCEDURE — 71000045 HC DOSC ROUTINE RECOVERY EA ADD'L HR: Performed by: SURGERY

## 2024-11-11 PROCEDURE — 36000707: Performed by: SURGERY

## 2024-11-11 PROCEDURE — C1894 INTRO/SHEATH, NON-LASER: HCPCS | Performed by: SURGERY

## 2024-11-11 PROCEDURE — C1769 GUIDE WIRE: HCPCS | Performed by: SURGERY

## 2024-11-11 PROCEDURE — 37000009 HC ANESTHESIA EA ADD 15 MINS: Performed by: SURGERY

## 2024-11-11 RX ORDER — HYDROMORPHONE HYDROCHLORIDE 1 MG/ML
0.2 INJECTION, SOLUTION INTRAMUSCULAR; INTRAVENOUS; SUBCUTANEOUS EVERY 5 MIN PRN
Status: DISCONTINUED | OUTPATIENT
Start: 2024-11-11 | End: 2024-11-11 | Stop reason: HOSPADM

## 2024-11-11 RX ORDER — PROTAMINE SULFATE 10 MG/ML
INJECTION, SOLUTION INTRAVENOUS
Status: DISCONTINUED | OUTPATIENT
Start: 2024-11-11 | End: 2024-11-11

## 2024-11-11 RX ORDER — GLUCAGON 1 MG
1 KIT INJECTION
Status: DISCONTINUED | OUTPATIENT
Start: 2024-11-11 | End: 2024-11-11 | Stop reason: HOSPADM

## 2024-11-11 RX ORDER — DEXMEDETOMIDINE HYDROCHLORIDE 100 UG/ML
INJECTION, SOLUTION INTRAVENOUS
Status: DISCONTINUED | OUTPATIENT
Start: 2024-11-11 | End: 2024-11-11

## 2024-11-11 RX ORDER — PROPOFOL 10 MG/ML
VIAL (ML) INTRAVENOUS CONTINUOUS PRN
Status: DISCONTINUED | OUTPATIENT
Start: 2024-11-11 | End: 2024-11-11

## 2024-11-11 RX ORDER — MIDAZOLAM HYDROCHLORIDE 1 MG/ML
INJECTION INTRAMUSCULAR; INTRAVENOUS
Status: DISCONTINUED | OUTPATIENT
Start: 2024-11-11 | End: 2024-11-11

## 2024-11-11 RX ORDER — DIPHENHYDRAMINE HYDROCHLORIDE 50 MG/ML
INJECTION INTRAMUSCULAR; INTRAVENOUS
Status: DISCONTINUED | OUTPATIENT
Start: 2024-11-11 | End: 2024-11-11

## 2024-11-11 RX ORDER — LIDOCAINE HYDROCHLORIDE 20 MG/ML
INJECTION, SOLUTION EPIDURAL; INFILTRATION; INTRACAUDAL; PERINEURAL
Status: DISCONTINUED | OUTPATIENT
Start: 2024-11-11 | End: 2024-11-11

## 2024-11-11 RX ORDER — VASOPRESSIN 20 [USP'U]/ML
INJECTION, SOLUTION INTRAMUSCULAR; SUBCUTANEOUS
Status: DISCONTINUED | OUTPATIENT
Start: 2024-11-11 | End: 2024-11-11

## 2024-11-11 RX ORDER — LIDOCAINE HYDROCHLORIDE 10 MG/ML
INJECTION, SOLUTION EPIDURAL; INFILTRATION; INTRACAUDAL; PERINEURAL
Status: DISCONTINUED | OUTPATIENT
Start: 2024-11-11 | End: 2024-11-11 | Stop reason: HOSPADM

## 2024-11-11 RX ORDER — ONDANSETRON HYDROCHLORIDE 2 MG/ML
INJECTION, SOLUTION INTRAVENOUS
Status: DISCONTINUED | OUTPATIENT
Start: 2024-11-11 | End: 2024-11-11

## 2024-11-11 RX ORDER — ACETAMINOPHEN 500 MG
1000 TABLET ORAL ONCE
Status: COMPLETED | OUTPATIENT
Start: 2024-11-11 | End: 2024-11-11

## 2024-11-11 RX ORDER — CALCIUM CHLORIDE 100 MG/ML
INJECTION, SOLUTION INTRAVENOUS
Status: DISCONTINUED | OUTPATIENT
Start: 2024-11-11 | End: 2024-11-11

## 2024-11-11 RX ORDER — LIDOCAINE HYDROCHLORIDE 10 MG/ML
1 INJECTION, SOLUTION EPIDURAL; INFILTRATION; INTRACAUDAL; PERINEURAL ONCE
Status: COMPLETED | OUTPATIENT
Start: 2024-11-11 | End: 2024-11-11

## 2024-11-11 RX ORDER — HEPARIN SODIUM 1000 [USP'U]/ML
INJECTION, SOLUTION INTRAVENOUS; SUBCUTANEOUS
Status: DISCONTINUED | OUTPATIENT
Start: 2024-11-11 | End: 2024-11-11

## 2024-11-11 RX ORDER — FENTANYL CITRATE 50 UG/ML
INJECTION, SOLUTION INTRAMUSCULAR; INTRAVENOUS
Status: DISCONTINUED | OUTPATIENT
Start: 2024-11-11 | End: 2024-11-11

## 2024-11-11 RX ORDER — CEFAZOLIN 2 G/1
2 INJECTION, POWDER, FOR SOLUTION INTRAMUSCULAR; INTRAVENOUS
Status: COMPLETED | OUTPATIENT
Start: 2024-11-11 | End: 2024-11-11

## 2024-11-11 RX ORDER — PHENYLEPHRINE HYDROCHLORIDE 10 MG/ML
INJECTION INTRAVENOUS
Status: DISCONTINUED | OUTPATIENT
Start: 2024-11-11 | End: 2024-11-11

## 2024-11-11 RX ORDER — HALOPERIDOL 5 MG/ML
0.5 INJECTION INTRAMUSCULAR EVERY 10 MIN PRN
Status: DISCONTINUED | OUTPATIENT
Start: 2024-11-11 | End: 2024-11-11 | Stop reason: HOSPADM

## 2024-11-11 RX ORDER — SODIUM CHLORIDE 0.9 % (FLUSH) 0.9 %
10 SYRINGE (ML) INJECTION
Status: DISCONTINUED | OUTPATIENT
Start: 2024-11-11 | End: 2024-11-11 | Stop reason: HOSPADM

## 2024-11-11 RX ADMIN — DIPHENHYDRAMINE HYDROCHLORIDE 12.5 MG: 50 INJECTION INTRAMUSCULAR; INTRAVENOUS at 04:11

## 2024-11-11 RX ADMIN — VASOPRESSIN 1 UNITS: 20 INJECTION INTRAVENOUS at 05:11

## 2024-11-11 RX ADMIN — SODIUM CHLORIDE: 0.9 INJECTION, SOLUTION INTRAVENOUS at 04:11

## 2024-11-11 RX ADMIN — FENTANYL CITRATE 25 MCG: 50 INJECTION, SOLUTION INTRAMUSCULAR; INTRAVENOUS at 04:11

## 2024-11-11 RX ADMIN — DEXMEDETOMIDINE 4 MCG: 100 INJECTION, SOLUTION, CONCENTRATE INTRAVENOUS at 04:11

## 2024-11-11 RX ADMIN — GLYCOPYRROLATE 0.2 MG: 0.2 INJECTION, SOLUTION INTRAMUSCULAR; INTRAVENOUS at 05:11

## 2024-11-11 RX ADMIN — HYDROMORPHONE HYDROCHLORIDE 0.2 MG: 1 INJECTION, SOLUTION INTRAMUSCULAR; INTRAVENOUS; SUBCUTANEOUS at 07:11

## 2024-11-11 RX ADMIN — HEPARIN SODIUM 1000 UNITS: 1000 INJECTION, SOLUTION INTRAVENOUS; SUBCUTANEOUS at 05:11

## 2024-11-11 RX ADMIN — MIDAZOLAM HYDROCHLORIDE 0.5 MG: 2 INJECTION, SOLUTION INTRAMUSCULAR; INTRAVENOUS at 04:11

## 2024-11-11 RX ADMIN — HEPARIN SODIUM 3000 UNITS: 1000 INJECTION, SOLUTION INTRAVENOUS; SUBCUTANEOUS at 04:11

## 2024-11-11 RX ADMIN — LIDOCAINE HYDROCHLORIDE 20 MG: 20 INJECTION, SOLUTION EPIDURAL; INFILTRATION; INTRACAUDAL at 04:11

## 2024-11-11 RX ADMIN — FENTANYL CITRATE 25 MCG: 50 INJECTION, SOLUTION INTRAMUSCULAR; INTRAVENOUS at 05:11

## 2024-11-11 RX ADMIN — MIDAZOLAM HYDROCHLORIDE 1 MG: 2 INJECTION, SOLUTION INTRAMUSCULAR; INTRAVENOUS at 04:11

## 2024-11-11 RX ADMIN — PROTAMINE SULFATE 30 MG: 10 INJECTION, SOLUTION INTRAVENOUS at 05:11

## 2024-11-11 RX ADMIN — PROPOFOL 50 MCG/KG/MIN: 10 INJECTION, EMULSION INTRAVENOUS at 04:11

## 2024-11-11 RX ADMIN — LIDOCAINE HYDROCHLORIDE 1 MG: 10 INJECTION, SOLUTION EPIDURAL; INFILTRATION; INTRACAUDAL; PERINEURAL at 11:11

## 2024-11-11 RX ADMIN — ACETAMINOPHEN 1000 MG: 500 TABLET ORAL at 11:11

## 2024-11-11 RX ADMIN — DEXMEDETOMIDINE 4 MCG: 100 INJECTION, SOLUTION, CONCENTRATE INTRAVENOUS at 05:11

## 2024-11-11 RX ADMIN — PROPOFOL 30 MG: 10 INJECTION, EMULSION INTRAVENOUS at 05:11

## 2024-11-11 RX ADMIN — PROPOFOL 40 MG: 10 INJECTION, EMULSION INTRAVENOUS at 04:11

## 2024-11-11 RX ADMIN — CEFAZOLIN 2 G: 2 INJECTION, POWDER, FOR SOLUTION INTRAMUSCULAR; INTRAVENOUS at 04:11

## 2024-11-11 RX ADMIN — HEPARIN SODIUM 3000 UNITS: 1000 INJECTION, SOLUTION INTRAVENOUS; SUBCUTANEOUS at 05:11

## 2024-11-11 RX ADMIN — PHENYLEPHRINE HYDROCHLORIDE 200 MCG: 10 INJECTION INTRAVENOUS at 05:11

## 2024-11-11 RX ADMIN — PHENYLEPHRINE HYDROCHLORIDE 100 MCG: 10 INJECTION INTRAVENOUS at 05:11

## 2024-11-11 RX ADMIN — DEXMEDETOMIDINE 8 MCG: 100 INJECTION, SOLUTION, CONCENTRATE INTRAVENOUS at 04:11

## 2024-11-11 RX ADMIN — ONDANSETRON 400 MG: 2 INJECTION INTRAMUSCULAR; INTRAVENOUS at 05:11

## 2024-11-11 RX ADMIN — ONDANSETRON 4 MG: 2 INJECTION INTRAMUSCULAR; INTRAVENOUS at 04:11

## 2024-11-11 NOTE — H&P
Expand All Collapse All    VASCULAR SURGERY SERVICE     CHIEF COMPLAINT:  Functional left AV graft dysfunctional left AV graft     HISTORY OF PRESENT ILLNESS: Eva Bloom is a 58 y.o. female end-stage renal disease, with multiple serious medical comorbidities including admission for sepsis 2 months ago, EF 20%, AFib on Eliquis, who is having increasing bleeding after her dialysis runs.  I placed a revision, left AV graft with long interposition Accu Seal 12/28/2022.  She has had uninterrupted use of this graft ever since.     Recent weeks she is had increased bleeding after dialysis sticks     S/p:     1aaa.  Excision, left AV graft for [place]27/2023 [place] (07/21/2023, Dr. Werner)  1aa. Reclosure, L AVG conter-incision 5/1/2023  1a. PTA, L AVG 4/19/2023  Excision, excluded L AVG and permacath 1/25/2023  revision, left AV graft with long interposition Accu Seal 12/28/2022  Original left AV graft 2017 with subsequent covered stent placement in venous outflow     09/1/2023:  She now returns after excision of the left AV graft for infection proximally 5 weeks ago.  She is been dialyzing through a PermCath.     09/22/2023:  Now returns with breakdown of prior counter incision for graft excision.  Notably, she has metastatic renal cell carcinoma to the lungs     10/20/2023:  She now returns after excision of the left AV graft remnant.  She is continued to deteriorate with weight loss, now BMI 13, was recently hospitalized for her declining state, has now been put on hospice.  This point she is still dialyzing through a PermCath     2/2024:  referred by Dr. Farooq for L 3rd toe wound.  Pt has resumed Oncologic care after stopping hospice care and desires to proceed with ulcer healing treatment.     4/2024:  +wound care with healing wounds.     7/2024:  stronger, wounds improving, HD via catheter without issues.     9/2024:  walking with walker, HD going well.  Wound care without signs of  infection.     10/2024: operation delayed due to patient continuing anticoagulation preoperatively    11/2024: Met with cardiology and had defibrillator restarted         Past Medical History:   Diagnosis Date    Anemia      Anticoagulant long-term use      Atrial fibrillation      Bronchitis 03/01/2017    Cancer 2016     kidney cancer    CHF (congestive heart failure), NYHA class II, chronic, systolic      CMV (cytomegalovirus) antibody positive      CVA (cerebral vascular accident) 1/3/2024    Encounter for blood transfusion      ESRD (end stage renal disease)      Essential hypertension 09/23/2015    H/O herpes simplex type 2 infection      Herpes simplex type 1 antibody positive      History of kidney cancer       s/p left nephrectomy 1/2016    Hyperparathyroidism, unspecified      Hyperuricemia without signs of inflammatory arthritis and tophaceous disease      Kidney stones      LGSIL (low grade squamous intraepithelial dysplasia)      Myocardiopathy 07/21/2017    Prediabetes      Proteinuria      Renal disorder      Thyroid nodule      Urate nephropathy                 Past Surgical History:   Procedure Laterality Date    BREAST CYST EXCISION        COLONOSCOPY N/A 11/12/2015    COLONOSCOPY N/A 03/12/2021     Procedure: COLONOSCOPY;  Surgeon: Brendon Lanier MD;  Location: [facility];  Service: Endoscopy;  Laterality: N/A;  covid test 3/9, labs prior, prep instr mailed -ml    EXCISION OF ARTERIOVENOUS FISTULA Left 09/27/2023     Procedure: EXCISION, AV FISTULA;  Surgeon: FARIDEH Muñoz III, MD;  Location: [facility] OR [address] [place] [address];  Service: Vascular;  Laterality: Left;  LUE AV graft excision    INSERTION OF BIVENTRICULAR IMPLANTABLE CARDIOVERTER-DEFIBRILLATOR (ICD)   04/2021    INSERTION OF TUNNELED CENTRAL VENOUS CATHETER (CVC) WITH SUBCUTANEOUS PORT N/A 01/25/2023     Procedure: INSERTION, DUAL LUMEN CATHETER WITH PORT, WITH IMAGING GUIDANCE;  Surgeon: FARIDEH Muñoz III, MD;  Location: [facility] OR [address] [place] [address];   Service: Peripheral Vascular;  Laterality: N/A;  possinble permcath placment    NEPHRECTOMY-LAPAROSCOPIC Left 01/12/2016    PERCUTANEOUS TRANSLUMINAL ANGIOPLASTY OF ARTERIOVENOUS FISTULA Left 04/19/2023     Procedure: PTA, AV FISTULA;  Surgeon: FARIDEH Muñoz III, MD;  Location: [facility] CATH LAB;  Service: Peripheral Vascular;  Laterality: Left;    PERITONEAL CATHETER INSERTION        Permacath insertion   01/12/2017    PLACEMENT OF ARTERIOVENOUS GRAFT   12/28/2022     Procedure: INSERTION, GRAFT, ARTERIOVENOUS;  Surgeon: FARIDEH Muñoz III, MD;  Location: [facility] OR [facility] FLR;  Service: Peripheral Vascular;;    REMOVAL, GRAFT, ARTERIOVENOUS, UPPER EXTREMITY Left 01/25/2023     Procedure: REMOVAL, GRAFT, ARTERIOVENOUS, UPPER EXTREMITY;  Surgeon: FARIDEH Muñoz III, MD;  [facility];  Service: Peripheral Vascular;  Laterality: Left;    REVISION OF ARTERIOVENOUS FISTULA Left 05/01/2023     Procedure: REVISION, AV FISTULA;  Surgeon: FARIDEH Muñoz III, MD;  [facility];  Service: Peripheral Vascular;  Laterality: Left;  LUE AVG revision    REVISION OF PROCEDURE INVOLVING ARTERIOVENOUS GRAFT Left 12/28/2022     Procedure: REVISION, PROCEDURE INVOLVING ARTERIOVENOUS GRAFT;  Surgeon: FARIDEH Muñoz III, MD;  [facility];  Service: Peripheral Vascular;  Laterality: Left;    REVISION OF PROCEDURE INVOLVING ARTERIOVENOUS GRAFT Left 07/21/2023     Procedure: LEFT ARM ARTERIOVENOUS GRAFT EXCISION  AND LIGATION;  Surgeon: Obi Werner MD;  Location[facility];  Service: Vascular;  Laterality: Left;  Left AVG excision and revision with interposition    SALPINGOOPHORECTOMY Right 2016     KJB---DAVINCI    TONSILLECTOMY        TUBAL LIGATION               Current Medications      Current Outpatient Medications:     acetaminophen (TYLENOL) 500 MG tablet, Take 1 tablet (500 mg total) by mouth every 4 (four) hours as needed for Pain or Temperature greater than (100.5 or greater).,  Disp: 30 tablet, Rfl: 0    acetaminophen-codeine 300-60mg (TYLENOL #4) 300-60 mg Tab, Take 1 tablet by mouth nightly as needed (foot pain)., Disp: 30 tablet, Rfl: 0    albuterol-ipratropium (DUO-NEB) 2.5 mg-0.5 mg/3 mL nebulizer solution, Take by nebulization., Disp: , Rfl:     apixaban (ELIQUIS) 2.5 mg Tab, Take 1 tablet (2.5 mg total) by mouth 2 (two) times daily., Disp: 60 tablet, Rfl: 5    aspirin (ECOTRIN) 81 MG EC tablet, Take 1 tablet (81 mg total) by mouth once daily., Disp: 90 tablet, Rfl: 2    atorvastatin (LIPITOR) 40 MG tablet, Take 1 tablet (40 mg total) by mouth once daily., Disp: 90 tablet, Rfl: 3    bisacodyL (DULCOLAX) 10 mg Supp, Place 10 mg rectally., Disp: , Rfl:     epoetin david-epbx (RETACRIT INJ), , Disp: , Rfl:     hyoscyamine (LEVSIN/SL) 0.125 mg Subl, Place under the tongue., Disp: , Rfl:     lactulose (CHRONULAC) 10 gram/15 mL solution, Take 15 mLs (10 g total) by mouth 3 (three) times daily as needed (constipation)., Disp: 473 mL, Rfl: 0    lanthanum (FOSRENOL) 1000 MG chewable tablet, Take 1 tablet by mouth., Disp: , Rfl:     levothyroxine (SYNTHROID) 75 MCG tablet, Take 1 tablet (75 mcg total) by mouth once daily., Disp: 90 tablet, Rfl: 2    lidocaine-prilocaine (EMLA) cream, APPLY ATLEAST 30 MINUTES BEFORE TREATMENT 3 TIMES A WEEK, Disp: 30 g, Rfl: 11    LORAZEPAM INTENSOL 2 mg/mL Conc, Take by mouth., Disp: , Rfl:     metoprolol succinate (TOPROL-XL) 25 MG 24 hr tablet, Take 1 tablet (25 mg total) by mouth once daily., Disp: 30 tablet, Rfl: 11    mirtazapine (REMERON) 7.5 MG Tab, Take 1 tablet (7.5 mg total) by mouth every evening., Disp: 90 tablet, Rfl: 2    mv,Ca,min-folic acid-vit K1 (ONE-A-DAY WOMEN'S 50 PLUS) 400-20 mcg Tab, Take 1 tablet by mouth once daily., Disp: , Rfl:     naloxone (NARCAN) 1 mg/mL injection, 2 mg (1 mg per nostril) by Nasal route as needed for opioid overdose; may repeat in 3 to 5 minutes if not effective. Call 911, Disp: 2 mL, Rfl: 11    nystatin  (MYCOSTATIN) cream, SMARTSIG:sparingly Topical 2-4 Times Daily PRN, Disp: , Rfl:     ondansetron (ZOFRAN-ODT) 4 MG TbDL, Take by mouth., Disp: , Rfl:     pantoprazole (PROTONIX) 40 MG tablet, Take 1 tablet (40 mg total) by mouth 2 (two) times daily., Disp: 60 tablet, Rfl: 11    acetaminophen (TYLENOL) 500 MG tablet, Take 500 mg by mouth every 6 (six) hours as needed for Pain. (Patient not taking: Reported on 9/25/2024), Disp: , Rfl:     cabozantinib (CABOMETYX) 60 mg Tab, TAKE ONE TABLET BY MOUTH ONCE DAILY AT THE SAME TIME ON AN EMPTY STOMACH AT LEAST 1 HOUR BEFORE OR 2 HOURS AFTER EATING. AVOID GRAPEFRUIT PRODUCTS (Patient not taking: Reported on 9/25/2024), Disp: 30 tablet, Rfl: 4  No current facility-administered medications for this visit.     Facility-Administered Medications Ordered in Other Visits:     0.9%  NaCl infusion, , Intravenous, Continuous, Soni Bhatti MD, New Bag at 07/21/23 1116              Review of patient's allergies indicates:   Allergen Reactions    Carvedilol Other (See Comments)       Nausea/vomiting    Allopurinol         Other reaction(s): abnormal transaminases                Family History   Problem Relation Name Age of Onset    Hypertension Mother        Diabetes Father        Kidney disease Father        No Known Problems Sister Sophy      Heart disease Sister Maria A      No Known Problems Sister Claudine      No Known Problems Brother Dax      No Known Problems Brother Don      Cataracts Maternal Aunt        No Known Problems Maternal Uncle        No Known Problems Paternal Aunt        No Known Problems Paternal Uncle        No Known Problems Maternal Grandmother        Diabetes Maternal Grandfather        No Known Problems Paternal Grandmother        No Known Problems Paternal Grandfather        No Known Problems Son        No Known Problems Son        No Known Problems Other        Breast cancer Neg Hx        Colon cancer Neg Hx        Cancer Neg Hx        Stroke Neg Hx         Amblyopia Neg Hx        Blindness Neg Hx        Glaucoma Neg Hx        Macular degeneration Neg Hx        Retinal detachment Neg Hx        Strabismus Neg Hx        Thyroid disease Neg Hx             Social History   Social History            Tobacco Use    Smoking status: Never       Passive exposure: Never    Smokeless tobacco: Never   Substance Use Topics    Alcohol use: No    Drug use: Yes       Types: Hydrocodone            PHYSICAL EXAM:     Vitals:    24 1115   BP: (!) 140/82   Pulse: 86   Resp: 18   Temp: 97.9 °F (36.6 °C)         Constitutional:  She appears very frail and deconditioned   Neurological: Normal speech  no focal findings  CN II - XII grossly intact.    Psychiatric: Mood and affect appropriate and symmetric.   HEENT: Normocephalic / atraumatic  PERRLA  Midline trachea  No scars across the neck   Cardiac: Regular rate and rhythm.   Pulmonary: Normal pulmonary effort.   Abdomen: Soft, not distended.     Skin: Warm and well perfused.    Vascular:  Radial pulses are nonpalpable bilaterally.   Extremities/  Musculoskeletal: No edema.   Right arm inc well healed, bilateral toes and L heel with edema and dry ischemic changes         IMAGIN/2024  Right lower extremity pressures and waveforms indicate moderate to severe arterial occlusive disease.  Left lower extremity pressures and waveforms indicate moderate to severe arterial occlusive disease.  Ankle pressures are non-compressible indicating possible medial calcinosis.     Left lower extremity arterial ultrasound shows decreased external iliac artery flow indicating proximal stenosis with decreased distal femoropopliteal artery flow, heavily calcified arteries, an occluded PT and peroneal arteries.     IMPRESSION:   Status post excision, left AV graft remnant.    Patient's medical status continued to deteriorate and now off home hospice  AFib on Eliquis  Severe systolic dysfunction EF 20%  Metastatic renal cell to lung     PLAN    TO OR  for angiogram with possible intervention

## 2024-11-11 NOTE — PROGRESS NOTES
Patient has been identified as having an implanted cardiac rhythm device (CRD); the implanted device is a [employer] Sub Q defibrillator.      The planned surgical procedure is a Angiogram Extremity Unilateral .    No noted pacer dependency.       DEFIBRILLATORS/NON-DEPENDENT ANY LOCATION    Per protocol,a magnet should be applied directly over the implanted device during entire surgical procedure when electrocautery will be used.    If no electrocautery is planned, magnet application is not needed.    For additional questions, please contact the Arrhythmia Department at Ext [phone].

## 2024-11-11 NOTE — PROGRESS NOTES
MD at .S. with patient & family updating them on surgery time. Complaints of feeling hungry. Warm blanket offered. No distress noted.

## 2024-11-11 NOTE — ANESTHESIA PREPROCEDURE EVALUATION
[facility]  Anesthesia Pre-Operative Evaluation         Patient Name: Eva Bloom  YOB: 1965  MRN: [id number]    SUBJECTIVE:     Pre-operative evaluation for Procedure(s) (LRB):  Angiogram Extremity Unilateral (Right)     11/11/2024    Eva Bloom is a 59 y.o. female w/ a significant PMHx of CVA, NICM HFrEF (20%) s/p AICD, PAF on eliquis, ESRD (TThS), and RCC c/b mets to lungs presenting for an angiogram. .    Patient now presents for the above procedure(s).      LDA:        Hemodialysis Catheter 06/07/24 1047 left internal jugular (Active)   Number of days: 156       Prev airway:     Induction:  Intravenous    Intubated:  Postinduction    Mask Ventilation:  Easy mask    Attempts:  1    Attempted By:  CRNA    Method of Intubation:  Direct    Blade:  Tavo 3    Laryngeal View Grade: Grade I - full view of chords      Difficult Airway Encountered?: No      Complications:  None    Airway Device:  Oral endotracheal tube    Airway Device Size:  7.5    Style/Cuff Inflation:  Cuffed (inflated to minimal occlusive pressure)    Tube secured:  21    Secured at:  The lips    Placement Verified By:  Capnometry    Drips: None documented.      Patient Active Problem List   Diagnosis    Kidney stones    Hyperparathyroidism, secondary renal    Hyperuricemia without signs of inflammatory arthritis and tophaceous disease    Proteinuria    Urate nephropathy    Essential hypertension    Pap smear abnormality of cervix with ASCUS favoring benign    CMV (cytomegalovirus) antibody positive    Herpes simplex type 1 antibody positive    H/O herpes simplex type 2 infection    Multinodular goiter    Renal cell carcinoma of left kidney    History of kidney cancer    S/p nephrectomy left    Stenosis of arteriovenous dialysis fistula    Primary insomnia    Cardiomyopathy, nonischemic    Pulmonary hypertension    ESRD (end stage renal disease) on dialysis    Nonrheumatic mitral valve  regurgitation    Chronic combined systolic and diastolic congestive heart failure    Anemia in ESRD (end-stage renal disease)    Chronic kidney disease-mineral and bone disorder    Hyperphosphatemia    ICD (implantable cardioverter-defibrillator) in place - SubQ    Hypercalcemia    PAF (paroxysmal atrial fibrillation)    Debility    Malfunction of arteriovenous dialysis fistula    Aortic atherosclerosis    Ischemic ulcer of toe of left foot, with unspecified severity    Hypothyroidism    Critical limb ischemia of left lower extremity with gangrene    Hemorrhoid    Metastatic renal cell carcinoma to lung, unspecified laterality    Hoarseness of voice    Sensation of fullness in both ears    Body mass index (BMI) less than 19    Weight loss    Severe protein-calorie malnutrition    ACP (advance care planning)    PAD (peripheral artery disease)    Incontinence associated dermatitis    Ischemic ulcer of left heel due to atherosclerosis    Pressure injury of left heel, unstageable    Metastatic renal cell carcinoma    Ischemic ulcer of heel, left, with unspecified severity       Review of patient's allergies indicates:   Allergen Reactions    Carvedilol Other (See Comments)     Nausea/vomiting    Allopurinol      Other reaction(s): abnormal transaminases       Current Inpatient Medications:      Current Facility-Administered Medications on File Prior to Encounter   Medication Dose Route Frequency Provider Last Rate Last Admin    0.9%  NaCl infusion   Intravenous Continuous Soni Bhatti MD   New Bag at 07/21/23 1116     Current Outpatient Medications on File Prior to Encounter   Medication Sig Dispense Refill    acetaminophen (TYLENOL) 500 MG tablet Take 500 mg by mouth every 6 (six) hours as needed for Pain.      acetaminophen (TYLENOL) 500 MG tablet Take 1 tablet (500 mg total) by mouth every 4 (four) hours as needed for Pain or Temperature greater than (100.5 or greater). 30 tablet 0    acetaminophen-codeine  300-60mg (TYLENOL #4) 300-60 mg Tab Take 1 tablet by mouth nightly as needed (foot pain). 30 tablet 0    albuterol-ipratropium (DUO-NEB) 2.5 mg-0.5 mg/3 mL nebulizer solution Take by nebulization. (Patient not taking: Reported on 11/6/2024)      bisacodyL (DULCOLAX) 10 mg Supp Place 10 mg rectally. (Patient not taking: Reported on 11/6/2024)      cabozantinib (CABOMETYX) 60 mg Tab TAKE ONE TABLET BY MOUTH ONCE DAILY AT THE SAME TIME ON AN EMPTY STOMACH AT LEAST 1 HOUR BEFORE OR 2 HOURS AFTER EATING. AVOID GRAPEFRUIT PRODUCTS (Patient not taking: Reported on 11/6/2024) 30 tablet 4    epoetin david-epbx (RETACRIT INJ)  (Patient not taking: Reported on 11/6/2024)      hyoscyamine (LEVSIN/SL) 0.125 mg Subl Place under the tongue. (Patient not taking: Reported on 11/6/2024)      lactulose (CHRONULAC) 10 gram/15 mL solution Take 15 mLs (10 g total) by mouth 3 (three) times daily as needed (constipation). (Patient not taking: Reported on 11/6/2024) 473 mL 0    lanthanum (FOSRENOL) 1000 MG chewable tablet Take 1 tablet by mouth. (Patient not taking: Reported on 11/6/2024)      lidocaine-prilocaine (EMLA) cream APPLY ATLEAST 30 MINUTES BEFORE TREATMENT 3 TIMES A WEEK (Patient not taking: Reported on 11/6/2024) 30 g 11    LORAZEPAM INTENSOL 2 mg/mL Conc Take by mouth. (Patient not taking: Reported on 11/6/2024)      metoprolol succinate (TOPROL-XL) 25 MG 24 hr tablet Take 1 tablet (25 mg total) by mouth once daily. 90 tablet 0    mv,Ca,min-folic acid-vit K1 (ONE-A-DAY WOMEN'S 50 PLUS) 400-20 mcg Tab Take 1 tablet by mouth once daily. (Patient not taking: Reported on 11/6/2024)      naloxone (NARCAN) 1 mg/mL injection 2 mg (1 mg per nostril) by Nasal route as needed for opioid overdose; may repeat in 3 to 5 minutes if not effective. Call 911 (Patient not taking: Reported on 11/6/2024) 2 mL 11    nystatin (MYCOSTATIN) cream SMARTSIG:sparingly Topical 2-4 Times Daily PRN (Patient not taking: Reported on 11/6/2024)       ondansetron (ZOFRAN-ODT) 4 MG TbDL Take by mouth. (Patient not taking: Reported on 11/6/2024)      pantoprazole (PROTONIX) 40 MG tablet Take 1 tablet (40 mg total) by mouth 2 (two) times daily. (Patient not taking: Reported on 11/6/2024) 60 tablet 11    [DISCONTINUED] amLODIPine (NORVASC) 5 MG tablet TAKE 1 TABLET BY MOUTH EVERY DAY 90 tablet 3       Past Surgical History:   Procedure Laterality Date    BREAST CYST EXCISION      COLONOSCOPY N/A 11/12/2015    COLONOSCOPY N/A 03/12/2021    Procedure: COLONOSCOPY;  Surgeon: Brendon Lanier MD;  Location: [facility] ENDO;  Service: Endoscopy;  Laterality: N/A;  covid test 3/9, labs prior, prep instr mailed -ml    EXCISION OF ARTERIOVENOUS FISTULA Left 09/27/2023    Procedure: EXCISION, AV FISTULA;  Surgeon: FARIDEH Muñoz III, MD;  [facility];  Service: Vascular;  Laterality: Left;  LUE AV graft excision    INSERTION OF BIVENTRICULAR IMPLANTABLE CARDIOVERTER-DEFIBRILLATOR (ICD)  04/2021    INSERTION OF TUNNELED CENTRAL VENOUS CATHETER (CVC) WITH SUBCUTANEOUS PORT N/A 01/25/2023    Procedure: INSERTION, DUAL LUMEN CATHETER WITH PORT, WITH IMAGING GUIDANCE;  Surgeon: FARIDEH Muñoz III, MD;  [facility];  Service: Peripheral Vascular;  Laterality: N/A;  possinble permcath placment    NEPHRECTOMY-LAPAROSCOPIC Left 01/12/2016    PERCUTANEOUS TRANSLUMINAL ANGIOPLASTY OF ARTERIOVENOUS FISTULA Left 04/19/2023    Procedure: PTA, AV FISTULA;  Surgeon: FARIDEH Muñoz III, MD;  Location: [facility] CATH LAB;  Service: Peripheral Vascular;  Laterality: Left;    PERITONEAL CATHETER INSERTION      Permacath insertion  01/12/2017    PLACEMENT OF ARTERIOVENOUS GRAFT  12/28/2022    Procedure: INSERTION, GRAFT, ARTERIOVENOUS;  Surgeon: FARIDEH Muñoz III, MD;  [facility];  Service: Peripheral Vascular;;    REMOVAL, GRAFT, ARTERIOVENOUS, UPPER EXTREMITY Left 01/25/2023    Procedure: REMOVAL, GRAFT, ARTERIOVENOUS, UPPER EXTREMITY;  Surgeon: FARIDEH CHEN  Toni SAM MD;  [facility];  Service: Peripheral Vascular;  Laterality: Left;    REVISION OF ARTERIOVENOUS FISTULA Left 05/01/2023    Procedure: REVISION, AV FISTULA;  Surgeon: FARIDEH Muñoz III, MD;  [facility];  Service: Peripheral Vascular;  Laterality: Left;  LUE AVG revision    REVISION OF PROCEDURE INVOLVING ARTERIOVENOUS GRAFT Left 12/28/2022    Procedure: REVISION, PROCEDURE INVOLVING ARTERIOVENOUS GRAFT;  Surgeon: FARIDEH Muñoz III, MD;  [facility];  Service: Peripheral Vascular;  Laterality: Left;    REVISION OF PROCEDURE INVOLVING ARTERIOVENOUS GRAFT Left 07/21/2023    Procedure: LEFT ARM ARTERIOVENOUS GRAFT EXCISION  AND LIGATION;  Surgeon: Obi Werner MD;  Location: [facility] OR;  Service: Vascular;  Laterality: Left;  Left AVG excision and revision with interposition    SALPINGOOPHORECTOMY Right 2016    KJB---DAVINCI    TONSILLECTOMY      TUBAL LIGATION       OBJECTIVE:     Vital Signs Range (Last 24H):         Significant Labs:  Lab Results   Component Value Date    WBC 6.83 09/16/2024    HGB 9.7 (L) 10/29/2024    HCT 41 10/25/2024     09/16/2024    CHOL 75 (L) 05/01/2024    TRIG 104 05/01/2024    HDL 24 (L) 05/01/2024    ALT 27 09/16/2024    AST 47 (H) 09/16/2024     09/16/2024    K 3.7 09/16/2024     09/16/2024    CREATININE 6.0 (H) 09/16/2024    BUN 61 (H) 09/16/2024    CO2 24 09/16/2024    TSH 3.157 04/15/2024    INR 1.1 06/07/2024    HGBA1C 4.1 08/25/2023       Diagnostic Studies: No relevant studies.    EKG:   Results for orders placed or performed in visit on 07/15/24   IN OFFICE EKG 12-LEAD (to [place])    Collection Time: 07/15/24  9:22 AM   Result Value Ref Range    QRS Duration 108 ms    [facility] QTC Calculation 522 ms    Narrative    Test Reason : I10,I42.8,I48.0,I34.0,Z95.810,I50.42,N18.6,Z99.2,    Vent. Rate : 084 BPM     Atrial Rate : 084 BPM     P-R Int : 190 ms          QRS Dur : 108 ms      QT Int : 442 ms        P-R-T Axes : 039 -17 108 degrees     QTc Int : 522 ms    Normal sinus rhythm  Left atrial enlargement  T wave abnormality, consider lateral ischemia  Prolonged QT  Abnormal ECG  When compared with ECG of 13-APR-2024 20:08,  Significant changes have occurred  Confirmed by Princess Mojica MD ([id number]) on 7/19/2024 7:27:21 PM    Referred By:             Confirmed By:Princess Mojica MD       2D ECHO:  TTE:  Results for orders placed or performed during the hospital encounter of 07/05/24   Echo   Result Value Ref Range    Narrative      Left Ventricle: The left ventricle is normal in size. Moderately   increased wall thickness. There is moderate concentric hypertrophy. Severe   global hypokinesis present. There is severely reduced systolic function   with a visually estimated ejection fraction of 20 - 25%.    Right Ventricle: Moderate right ventricular enlargement. Systolic   function is moderately reduced.    Aortic Valve: The aortic valve is a trileaflet valve. Mildly restricted   motion. There is moderate stenosis. Aortic valve area by VTI is 0.86 cm².   Aortic valve peak velocity is 2.34 m/s. Mean gradient is 12 mmHg. The   dimensionless index is 0.31. Cannot exclude pseudostenosis due to low EF.    Mitral Valve: There is moderate bileaflet sclerosis. There is mild   stenosis. The mean pressure gradient across the mitral valve is 3 mmHg at   a heart rate of 80 bpm. There is moderate regurgitation with an eccentric   jet.    Tricuspid Valve: There is severe regurgitation with an eccentrically   directed jet.    Pulmonic Valve: There is moderate regurgitation.    Pulmonary Artery: The estimated pulmonary artery systolic pressure is   64 mmHg.    Pericardium: There is a moderate posterior effusion. No indication of   cardiac tamponade.         MUSA:  No results found. However, due to the size of the patient record, not all encounters were searched. Please check Results Review for a complete set of  results.    ASSESSMENT/PLAN:                                                                                                      11/11/2024  Eva Bloom is a 59 y.o., female.      Pre-op Assessment    I have reviewed the Patient Summary Reports.     I have reviewed the Nursing Notes. I have reviewed the NPO Status.   I have reviewed the Medications.     Review of Systems  Anesthesia Hx:  No problems with previous Anesthesia   Neg history of prior surgery.          Denies Family Hx of Anesthesia complications.    Denies Personal Hx of Anesthesia complications.                    Social:  Non-Smoker, No Alcohol Use       Hematology/Oncology:    Oncology Normal    -- Anemia:               Hematology Comments: hgb  7.8                    EENT/Dental:  EENT/Dental Normal           Cardiovascular:  Exercise tolerance: good   Hypertension    Dysrhythmias atrial fibrillation  CHF        On apixaban- unknown last dose        Congestive Heart Failure (CHF)                Hypertension     Atrial Fibrillation     Pulmonary:  Pulmonary Normal                       Renal/:  Chronic Renal Disease, ESRD, Dialysis   HD T, Th, Sa,  Last done 7/20     Kidney Function/Disease             Hepatic/GI:  Hepatic/GI Normal                    Musculoskeletal:  Musculoskeletal Normal                Neurological:   CVA                       CVA - Cerebrovasular Accident                 Endocrine:   Hypothyroidism       Hypothyroidism          Dermatological:  Skin Normal    Psych:  Psychiatric Normal                    Physical Exam  General: Well nourished        Anesthesia Plan  Type of Anesthesia, risks & benefits discussed:    Anesthesia Type: MAC, Gen Natural Airway  Intra-op Monitoring Plan: Standard ASA Monitors  Post Op Pain Control Plan: multimodal analgesia  Informed Consent: Informed consent signed with the Patient and all parties understand the risks and agree with anesthesia plan.  All questions answered.   ASA Score:  4  Day of Surgery Review of History & Physical: H&P Update referred to the surgeon/provider.    Ready For Surgery From Anesthesia Perspective.     .

## 2024-11-12 ENCOUNTER — DOCUMENT SCAN (OUTPATIENT)
Dept: HOME HEALTH SERVICES | Facility: HOSPITAL | Age: 59
End: 2024-11-12
Payer: MEDICARE

## 2024-11-12 LAB
POC ACTIVATED CLOTTING TIME K: 281 SEC (ref 74–137)
SAMPLE: ABNORMAL

## 2024-11-12 NOTE — DISCHARGE INSTRUCTIONS
Continue taking all of your home medications  Restart your Eliquis tomorrow  Continue taking the aspirin 81mg daily  Remove dressing in one days  You may shower at that time, no soaking the wound (Bath, pool, hot tub, etc) for two weeks  Reasons to call the office:  - Fever over 101F  - Signs of infection at the wound (redness, pain, warmth, pus)  - Numbness, tingling, or pain in the leg/foot/groin  - Extensive bruising at the groin  - Firmness or bulge in the groin  - Bleeding from the access site in your groin    Once you go home, please abide by the following restrictions:     Rest in bed or a recliner for the remainder of the day following the procedure.      You should not go home alone the day of the procedure.     Lifting and activity restrictions depend on the insertion site for the procedure:    Groin:   - No heavy lifting of more than 5 pounds, running, swimming, or strenuous walking for at least 2 days after the angiogram.   - You may return to your usual activity after the two days.   - Do not take a hot bath or shower for at least 12 hours after discharge.     Because of the sedation you were given for your procedure, we recommend that you have someone stay with you overnight following your procedure.  - Do not drive a car or operate machinery for the remainder of the day.  - Do not consume any alcoholic beverages for the remainder of the day.  - Postpone signing any important papers or making any important decision for the remainder of the day.  - Do not take any muscle relaxants, sedatives, hypnotics, or mood-altering medication today unless ordered by your physician who is aware you are taking the medication today.     If bleeding occurs:  - Apply manual pressure, and immediately seek medical attention at the nearest hospital.   - Seek immediate medical attention if you experience loss of sensation, redness, swelling or discharge from the insertion site.

## 2024-11-12 NOTE — PLAN OF CARE
Discharge instructions given and explained to patient and family with verbalized understanding. Patients V/S stable, denies N/V, tolerating PO fluids, rates pain level as tolerable, IV DC'd cath intact and No bleeding present. Pt left floor via W/C, stable for transport, responsible person available for transportation home.  Dsg to left groin CDI, No swelling, no bleeding, soft to touch

## 2024-11-12 NOTE — ANESTHESIA POSTPROCEDURE EVALUATION
Anesthesia Post Evaluation    Patient: Eva Bloom    Procedure(s) Performed: Procedure(s) (LRB):  Angiogram Extremity Unilateral (Right)  ANGIOPLASTY, PERIPHERAL BLOOD VESSEL (Right)    Final Anesthesia Type: MAC      Patient location during evaluation: PACU  Patient participation: Yes- Able to Participate  Level of consciousness: awake and alert and oriented  Post-procedure vital signs: reviewed and stable  Pain management: adequate  Airway patency: patent    PONV status at discharge: No PONV  Anesthetic complications: no      Cardiovascular status: blood pressure returned to baseline  Respiratory status: unassisted and spontaneous ventilation  Hydration status: euvolemic  Follow-up not needed.              Vitals Value Taken Time   /69 11/11/24 2017   Temp 36.7 °C (98 °F) 11/11/24 2015   Pulse 72 11/11/24 2026   Resp 14 11/11/24 2025   SpO2 94 % 11/11/24 2026   Vitals shown include unfiled device data.      No case tracking events are documented in the log.      Pain/Mimi Score: Pain Rating Prior to Med Admin: 6 (11/11/2024  7:42 PM)  Mimi Score: 9 (11/11/2024  7:30 PM)

## 2024-11-12 NOTE — BRIEF OP NOTE
[facility] - [facility] (2nd Fl)  Brief Operative Note    Surgery Date: 11/11/2024     Surgeons and Role:     * Corby Casey MD - Primary     * Augustin Pacheco MD - Fellow    Assisting Surgeon: None    Pre-op Diagnosis:  Critical lower limb ischemia [I70.229]    Post-op Diagnosis:  Post-Op Diagnosis Codes:     * Critical lower limb ischemia [I70.229]    Procedure(s) (LRB):  Ultrasound guided access L CFA   Aortogram   RLE Angiogram   R AT PTA [id number] Ultraverse     Anesthesia: Local MAC    Operative Findings:   AT runoff to 1st toe with multifocal >50% stenosis, treated with 2mm balloon    Estimated Blood Loss: 10cc  Specimens:   Specimen (24h ago, onward)      None              Discharge Note    OUTCOME: Patient tolerated treatment/procedure well without complication and is now ready for discharge.    DISPOSITION: Home or Self Care    FINAL DIAGNOSIS:  <principal problem not specified>    FOLLOWUP: In clinic    DISCHARGE INSTRUCTIONS:  No discharge procedures on file.  Continue taking all of your home medications  Restart your Eliquis tomorrow  Continue taking the aspirin 81mg daily  Remove dressing in one days  You may shower at that time, no soaking the wound (Bath, pool, hot tub, etc) for two weeks  Reasons to call the office:  - Fever over 101F  - Signs of infection at the wound (redness, pain, warmth, pus)  - Numbness, tingling, or pain in the leg/foot/groin  - Extensive bruising at the groin  - Firmness or bulge in the groin  - Bleeding from the access site in your groin    Once you go home, please abide by the following restrictions:     Rest in bed or a recliner for the remainder of the day following the procedure.      You should not go home alone the day of the procedure.     Lifting and activity restrictions depend on the insertion site for the procedure:     Groin:   - No heavy lifting of more than 5 pounds, running, swimming, or strenuous walking for at least 2 days after the angiogram.   - You may  return to your usual activity after the two days.   - Do not take a hot bath or shower for at least 12 hours after discharge.     Keep an adhesive bandage on the catheter insertion site for one day to help prevent infection.     Because of the sedation you were given for your procedure, we recommend that you have someone stay with you overnight following your procedure.  - Do not drive a car or operate machinery for the remainder of the day.  - Do not consume any alcoholic beverages for the remainder of the day.  - Postpone signing any important papers or making any important decision for the remainder of the day.  - Do not take any muscle relaxants, sedatives, hypnotics, or mood-altering medication today unless ordered by your physician who is aware you are taking the medication today.     If bleeding occurs:  - Apply manual pressure, and immediately seek medical attention at the nearest hospital.   - Seek immediate medical attention if you experience loss of sensation, redness, swelling or discharge from the insertion site.

## 2024-11-12 NOTE — TRANSFER OF CARE
"Anesthesia Transfer of Care Note    Patient: Eva lBoom    Procedure(s) Performed: Procedure(s) (LRB):  Angiogram Extremity Unilateral (Right)  ANGIOPLASTY, PERIPHERAL BLOOD VESSEL (Right)    Patient location: PACU    Anesthesia Type: general    Transport from OR: Transported from OR on 6-10 L/min O2 by face mask with adequate spontaneous ventilation    Post pain: adequate analgesia    Post assessment: no apparent anesthetic complications    Post vital signs: stable    Level of consciousness: sedated    Nausea/Vomiting: no nausea/vomiting    Complications: none    Transfer of care protocol was followed      Last vitals: Visit Vitals  BP (!) 140/82 (BP Location: Right arm)   Pulse 69   Temp 36.6 °C (97.9 °F) (Temporal)   Resp 18   Ht 5' 4" (1.626 m)   Wt 48.1 kg (106 lb 0.7 oz)   LMP 10/24/2016   SpO2 98%   Breastfeeding No   BMI 18.20 kg/m²     "

## 2024-11-13 ENCOUNTER — DOCUMENT SCAN (OUTPATIENT)
Dept: HOME HEALTH SERVICES | Facility: HOSPITAL | Age: 59
End: 2024-11-13
Payer: MEDICARE

## 2024-11-20 NOTE — OP NOTE
Date: 11/11/24    Surgeon: Corby Casey MD RPVI     Assistant: Augustin Pacheco MD     Pre-op Diagnosis:   1. Atherosclerosis right lower extremity with ulceration  2.   Patient Active Problem List    Diagnosis Date Noted    Metastatic renal cell carcinoma 07/22/2024    Ischemic ulcer of heel, left, with unspecified severity 07/22/2024    Pressure injury of left heel, unstageable 04/16/2024    Ischemic ulcer of left heel due to atherosclerosis 03/04/2024    Incontinence associated dermatitis 01/04/2024    PAD (peripheral artery disease) 11/01/2023    Severe protein-calorie malnutrition 10/11/2023    ACP (advance care planning) 10/11/2023    Weight loss 10/10/2023    Body mass index (BMI) less than 19 09/28/2023    Hoarseness of voice 09/21/2023    Sensation of fullness in both ears 09/21/2023    Metastatic renal cell carcinoma to lung, unspecified laterality 08/11/2023    Hemorrhoid 07/24/2023    Critical limb ischemia of left lower extremity with gangrene 07/20/2023    Ischemic ulcer of toe of left foot, with unspecified severity 07/19/2023    Hypothyroidism 07/19/2023    Aortic atherosclerosis 07/06/2023    Malfunction of arteriovenous dialysis fistula 12/16/2022    Debility 11/04/2022    PAF (paroxysmal atrial fibrillation) 07/25/2022    Hypercalcemia 04/04/2022    ICD (implantable cardioverter-defibrillator) in place - SubQ 07/15/2021    Hyperphosphatemia     Anemia in ESRD (end-stage renal disease)     Chronic kidney disease-mineral and bone disorder     Chronic combined systolic and diastolic congestive heart failure     Nonrheumatic mitral valve regurgitation 06/01/2020    ESRD (end stage renal disease) on dialysis     Cardiomyopathy, nonischemic 07/21/2017    Pulmonary hypertension 07/21/2017    Primary insomnia 06/14/2017    Stenosis of arteriovenous dialysis fistula 02/17/2017    S/p nephrectomy left 10/07/2016    History of kidney cancer 09/02/2016    Renal cell carcinoma of left kidney 01/27/2016     Multinodular goiter 11/25/2015    CMV (cytomegalovirus) antibody positive     Herpes simplex type 1 antibody positive     H/O herpes simplex type 2 infection     Essential hypertension 09/23/2015    Pap smear abnormality of cervix with ASCUS favoring benign 09/23/2015    Urate nephropathy     Kidney stones     Hyperparathyroidism, secondary renal     Hyperuricemia without signs of inflammatory arthritis and tophaceous disease     Proteinuria        Post-op Diagnosis: Same     Procedures:   1. US guided L CFA percutaneous access  2. L femoral angiogram  3. Aortogram  4. Moderate sedation  5. RLE angiogram  6. Balloon angioplasty of the R AT PTA [id number] Ultraverse balloon  7. 5fr Mynx     Anesthesia: Local     EBL: Minimal     Findings: Greater than 75% stenosis of the AT artery.  Successful revascularization / resolution of stenosis     Complications:  None; patient tolerated the procedure well.     Disposition: Recovery- hemodynamically stable in good condition     Attending Attestation: I was present and scrubbed for the entire procedure.  After an informed consent was obtained the patient was brought to the interventional suite and placed in the supine position. Both the patient and procedure were confirmed and identified during timeout process. The patient received perioperative antibiotics. The patient's bilateral groins were prepped and draped in usual sterile fashion. Using ultrasound guidance, the L common femoral artery vessel patency was confirmed. Then direct ultrasound guidance the L CFA was entered with a 21-G needle, Mandril wire and 4-Fr micropuncture needle. Then under fluoroscopic guidance, an 0.035-in wire was placed into the distal aorta. The micropuncture dilator was then exchanged over the wire for a 6-Vietnamese sheath. Sheathogram demonstrated access in the CFA. Next a VCF-catheter was placed over the wire and into the distal aorta under fluoroscopy and an aortogram was performed.  The common iliac  artery was then selected with the wire and the catheter was advanced into the external iliac artery over the wire and a leg angiogram was performed which demonstrated the above findings.    Based on the images from this diagnostic angio, a decision was made to intervene.    We had systemically heparinized the patient with 5000u of heparin.   Next, we placed a up-and-over sheath and used a 0.35 stiff angled Glide wire was used to select the popliteal artery.   Treatment of the AT artery was done with the balloon(s) listed above. A follow-up angiogram showed resolution of the lesions. Heparin was reversed with protamine. We then deployed 5fr Mynx closure device without issue. At the conclusion of the case the patient was noted to have no evidence of a groin hematoma. All instrument and sponge counts were correct at the end of the case. The patient tolerated the procedure well and was transferred to the pacu for further recovery.      MODERATE SEDATION   I was present for moderate sedation and monitored the patients cardio respiratory functions during the procedure. See nurses notes for Intra-service start and end times and for the log of the medications, doseage and route.     Time of sedation:  60 mins.

## 2024-11-25 ENCOUNTER — HOSPITAL ENCOUNTER (OUTPATIENT)
Dept: WOUND CARE | Facility: HOSPITAL | Age: 59
Discharge: HOME | End: 2024-11-25
Attending: INTERNAL MEDICINE
Payer: MEDICARE

## 2024-11-25 VITALS
DIASTOLIC BLOOD PRESSURE: 61 MMHG | SYSTOLIC BLOOD PRESSURE: 129 MMHG | RESPIRATION RATE: 18 BRPM | TEMPERATURE: 97 F | HEART RATE: 87 BPM

## 2024-11-25 DIAGNOSIS — L89.893 PRESSURE ULCER OF RIGHT LEG, STAGE 3: ICD-10-CM

## 2024-11-25 DIAGNOSIS — L97.529 ISCHEMIC TOE ULCER, LEFT, WITH UNSPECIFIED SEVERITY: ICD-10-CM

## 2024-11-25 DIAGNOSIS — L97.429: Primary | ICD-10-CM

## 2024-11-25 PROCEDURE — 99214 OFFICE O/P EST MOD 30 MIN: CPT | Mod: GW,,, | Performed by: INTERNAL MEDICINE

## 2024-11-25 PROCEDURE — 99213 OFFICE O/P EST LOW 20 MIN: CPT | Performed by: INTERNAL MEDICINE

## 2024-11-25 NOTE — PROGRESS NOTES
[facility]                Progress Note    Subjective:       Patient ID: Eva Bloom is a 59 y.o. female.    Chief Complaint: Wound Care and Dressing Change    Patient was seen today for follow up wound care visit. Patient in wheelchair pushed to exam room by , with LPN by side. Patient ambulated from the wheelchair, wheels lock, with limited assistance from LPN. She c/o generalized pain rating 8/10. Denies fever, nausea, chills, vomiting, or diarrhea at present. Dressing intact without strike through drainage. New wound to the Left 1st Toe and Right 2nd Toe noted. She reported HH came out as ordered. Wound dressing was updated and applied by LPN per MD orders. Patient ambulated from the exam chair with limited assistance from  to wheelchair, wheels lock, with LPN standing by ready to assist. Patient's  was present throughout the exam. AVS printed and given to patient. Next wound care visit will be for podiatry consult on 12/20/24, with Dr. Mandel.    Return to clinic in 3 week.      She underwent angioplasty to right AT two weeks ago. Wounds unchanged seh continue on HD TRS without issue she does nto ambulate secondary to leg pain      Review of Systems      Objective:        Physical Exam  Constitutional:       Appearance: She is ill-appearing.   Abdominal:      General: There is distension.   Musculoskeletal:      Right lower leg: No edema.      Left lower leg: No edema.   Skin:     Comments: Right first toe with soft eschar there is pressure injury to medial right foot no exposed bone    Left second and third toes with circumferential dry gangrenous from PIP to distal toe    The left heel has firm dry eschar w/o fluctuance   Neurological:      Mental Status: She is alert.         Vitals:    11/25/24 1627   BP: 129/61   Pulse: 87   Resp: 18   Temp: 97.4 °F (36.3 °C)       Assessment:           ICD-10-CM ICD-9-CM   1. Ischemic ulcer of heel,  left, with unspecified severity  L97.429 707.14   2. Ischemic toe ulcer, left, with unspecified severity  L97.529 707.15   3. Pressure ulcer of right leg, stage 3  L89.893 707.09     707.23            (Retired) Altered Skin Integrity 11/01/23 2027 Right lateral Thigh Ulceration (Active)   11/01/23 2027   Altered Skin Integrity Present on Admission - Did Patient arrive to the hospital with altered skin?: yes   Side: Right   Orientation: lateral   Location: Thigh   Wound Number:    Is this injury device related?:    Primary Wound Type: Ulceration   Description of Altered Skin Integrity:    Ankle-Brachial Index:    Pulses:    Removal Indication and Assessment: removed per policy   Wound Outcome:    (Retired) Wound Length (cm):    (Retired) Wound Width (cm):    (Retired) Depth (cm):    Wound Description (Comments):    Removal Indications:    Wound Image   11/25/24 1651   Dressing Appearance Clean;Dry 11/25/24 1651   Drainage Amount None 11/25/24 1651   Appearance Fibrin;Dry 11/25/24 1651   Periwound Area Intact 11/25/24 1651   Wound Edges Defined 11/25/24 1651   Wound Length (cm) 0.2 cm 11/25/24 1651   Wound Width (cm) 0.3 cm 11/25/24 1651   Wound Depth (cm) 0 cm 11/25/24 1651   Wound Volume (cm^3) 0 cm^3 11/25/24 1651   Wound Surface Area (cm^2) 0.06 cm^2 11/25/24 1651   Care Cleansed with:;Wound cleanser;Sterile normal saline 11/25/24 1651   Dressing Applied;Island/border 11/25/24 1651   Periwound Care Skin barrier film applied 11/25/24 1651   Dressing Change Due 12/20/24 11/25/24 1651            Altered Skin Integrity 02/22/24 0930 Left dorsal Toe, second Ulceration (Active)   02/22/24 0930   Altered Skin Integrity Present on Admission - Did Patient arrive to the hospital with altered skin?: yes   Side: Left   Orientation: dorsal   Location: Toe, second   Wound Number:    Is this injury device related?:    Primary Wound Type: Ulceration   Description of Altered Skin Integrity:    Ankle-Brachial Index:    Pulses:     Removal Indication and Assessment:    Wound Outcome:    (Retired) Wound Length (cm):    (Retired) Wound Width (cm):    (Retired) Depth (cm):    Wound Description (Comments):    Removal Indications:    Wound Image   11/25/24 1651   Dressing Appearance Open to air;No dressing 11/25/24 1651   Drainage Amount None 11/25/24 1651   Appearance Eschar;Dry;Yellow 11/25/24 1651   Black (%), Wound Tissue Color 90 % 11/25/24 1651   Yellow (%), Wound Tissue Color 10 % 11/25/24 1651   Periwound Area Dry 11/25/24 1651   Wound Edges Defined 11/25/24 1651   Care Cleansed with:;Sterile normal saline;Wound cleanser 11/25/24 1651   Dressing Applied;Other (comment) 11/25/24 1651   Periwound Care Moisture barrier applied 11/25/24 1651   Dressing Change Due 12/20/24 11/25/24 1651            Altered Skin Integrity 02/22/24 0930 Left plantar Heel Ulceration (Active)   02/22/24 0930   Altered Skin Integrity Present on Admission - Did Patient arrive to the hospital with altered skin?:    Side: Left   Orientation: plantar   Location: Heel   Wound Number:    Is this injury device related?:    Primary Wound Type: Ulceration   Description of Altered Skin Integrity:    Ankle-Brachial Index:    Pulses:    Removal Indication and Assessment:    Wound Outcome:    (Retired) Wound Length (cm):    (Retired) Wound Width (cm):    (Retired) Depth (cm):    Wound Description (Comments):    Removal Indications:    Wound Image   11/25/24 1651   Dressing Appearance Open to air;No dressing 11/25/24 1651   Drainage Amount None 11/25/24 1651   Appearance Eschar;Dry 11/25/24 1651   Black (%), Wound Tissue Color 100 % 11/25/24 1651   Periwound Area Intact;Dry 11/25/24 1651   Wound Edges Defined 11/25/24 1651   Care Cleansed with:;Wound cleanser;Sterile normal saline 11/25/24 1651   Dressing Applied;Other (comment) 11/25/24 1651   Periwound Care Moisture barrier applied 11/25/24 1651   Dressing Change Due 12/20/24 11/25/24 1651            Wound 07/22/24 Arterial  Ulcer Left dorsal Toe, second (Active)   07/22/24  Toe, second   Present on Original Admission: Y   Primary Wound Type: Arterial ulc   Side: Left   Orientation: dorsal   Wound Approximate Age at First Assessment (Weeks):    Wound Number:    Is this injury device related?:    Incision Type:    Closure Method:    Wound Description (Comments):    Type:    Additional Comments:    Ankle-Brachial Index:    Pulses:    Removal Indication and Assessment:    Wound Outcome:    Wound Image    11/25/24 1651   Dressing Appearance Open to air;No dressing 11/25/24 1651   Drainage Amount None 11/25/24 1651   Appearance Dry;Eschar 11/25/24 1651   Periwound Area Dry 11/25/24 1651   Wound Edges Defined 11/25/24 1651   Care Cleansed with:;Sterile normal saline;Wound cleanser 11/25/24 1651   Dressing Applied;Other (comment) 11/25/24 1651   Periwound Care Moisture barrier applied 11/25/24 1651   Dressing Change Due 12/20/24 11/25/24 1651            Wound 07/22/24 Arterial Ulcer Right distal Toe, first (Active)   07/22/24  Toe, first   Present on Original Admission: Y   Primary Wound Type: Arterial ulc   Side: Right   Orientation: distal   Wound Approximate Age at First Assessment (Weeks):    Wound Number:    Is this injury device related?:    Incision Type:    Closure Method:    Wound Description (Comments):    Type:    Additional Comments:    Ankle-Brachial Index:    Pulses:    Removal Indication and Assessment:    Wound Outcome:    Wound Image     11/25/24 1651   Dressing Appearance Open to air;No dressing 11/25/24 1651   Drainage Amount None 11/25/24 1651   Appearance Fibrin;Dry;Eschar 11/25/24 1651   Periwound Area Dry 11/25/24 1651   Wound Edges Defined 11/25/24 1651   Care Cleansed with:;Wound cleanser;Sterile normal saline 11/25/24 1651   Dressing Applied;Other (comment);Foam 11/25/24 1651   Periwound Care Moisture barrier applied 11/25/24 1651   Dressing Change Due 12/20/24 11/25/24 1651            Wound 11/25/24 Arterial Ulcer  Left Toe, first (Active)   11/25/24  Toe, first   Present on Original Admission:    Primary Wound Type: Arterial ulc   Side: Left   Orientation:    Wound Approximate Age at First Assessment (Weeks):    Wound Number:    Is this injury device related?:    Incision Type:    Closure Method:    Wound Description (Comments):    Type:    Additional Comments:    Ankle-Brachial Index:    Pulses:    Removal Indication and Assessment:    Wound Outcome:    Wound Image    11/25/24 1651   Dressing Appearance Open to air;No dressing 11/25/24 1651   Drainage Amount None 11/25/24 1651   Appearance Eschar;Dry 11/25/24 1651   Black (%), Wound Tissue Color 100 % 11/25/24 1651   Periwound Area Dry 11/25/24 1651   Wound Edges Defined 11/25/24 1651   Care Cleansed with:;Sterile normal saline;Wound cleanser 11/25/24 1651   Dressing Applied;Other (comment) 11/25/24 1651   Periwound Care Moisture barrier applied 11/25/24 1651   Dressing Change Due 12/20/24 11/25/24 1651            Wound 11/25/24 Arterial Ulcer Right medial Toe, second (Active)   11/25/24  Toe, second   Present on Original Admission:    Primary Wound Type: Arterial ulc   Side: Right   Orientation: medial   Wound Approximate Age at First Assessment (Weeks):    Wound Number:    Is this injury device related?:    Incision Type:    Closure Method:    Wound Description (Comments):    Type:    Additional Comments:    Ankle-Brachial Index:    Pulses:    Removal Indication and Assessment:    Wound Outcome:    Wound Image   11/25/24 1651   Dressing Appearance Open to air;No dressing 11/25/24 1651   Drainage Amount None 11/25/24 1651   Appearance Pink;Moist 11/25/24 1651   Red (%), Wound Tissue Color 100 % 11/25/24 1651   Periwound Area Dry 11/25/24 1651   Wound Edges Open;Defined 11/25/24 1651   Wound Length (cm) 0.5 cm 11/25/24 1651   Wound Width (cm) 0.3 cm 11/25/24 1651   Wound Depth (cm) 0.1 cm 11/25/24 1651   Wound Volume (cm^3) 0.015 cm^3 11/25/24 1651   Wound Surface Area  (cm^2) 0.15 cm^2 11/25/24 1651   Care Cleansed with:;Sterile normal saline;Wound cleanser 11/25/24 1651   Dressing Applied;Foam;Other (comment) 11/25/24 1651   Periwound Care Moisture barrier applied 11/25/24 1651   Dressing Change Due 12/20/24 11/25/24 1651           Plan:            Dry gangrenous toes on left and left heel, right first toe with soft eschar no exposed bone, she will need definitive amputation for nont viable toes, will arrange follow up with podiaty in wound care to discuss timing of this, until then continue preventive measures on left with daily betadine painting, on right will add offloading silicone paddign to adjacent toes avoid any compression given known PAD continue dapt as prescribed by vascular   Tissue pathology and/or culture taken     [] Yes      [x] No  Sharp debridement performed                   [] Yes       [x] No  Labs ordered     [] Yes       [x] No  Imaging ordered    [] Yes      [x] No    Orders Placed This Encounter   Procedures    Change dressing     Wound Dressing Orders    Dressing change frequency twice a week and HH will visit patient when not seen in clinic  Remove old dressing  Cleanse or irrigate with: Normal Saline    Right Lateral Thigh  Protect periwound with: Cavilon   Dressing: Betadine to the wound bed, and then Silicone border to cover    Right 1st-2nd Toes  Put foam dressing between the two toes    Left 1st-3rd Toes, Right 1st-2nd Toes and Left heel:  Clean with Normal Saline  Patient will paint with betadine daily. Leave open to air.    Return to clinic in 3 week.    SUBSEQUENT HOME HEALTH ORDERS     Home Health for Wound Care. Visit twice a week. Patient will return to clinic on 12/20/24      Wound Dressing Orders    For right lateral thigh wound:  Dressing change frequency once a week or prn  Remove old dressing.  Cleanse or irrigate with: Normal Saline  Protect periwound with:  Cavilon  Primary dressing: betadine iodine to wound bed  Secondary dressing:  Cover with silicone border dressing (Mepilex, Foam Lite Border, etc)    Right 1st-2nd Toes  Put foam dressing between the two toes    For Right 1st Dital Toe, Left 1st-3rd Toes and Left heel.  Patient is using betadine to treat wounds on foot, daily. Leave open to air. Please provide supplies for daily changes.    Evaluate for acute changes (purulence, increased redness/swelling, increased drainage, malodor, increased pain, pallor, necrosis) please contact physician on any acute changes.    DO NOT DEVIATE FROM ORDER. PLEASE ORDER SUPPLIES NEED TO APPLY TO PATIENT'S WOUNDS. Call [facility] at [phone] if there are any questions about dressing application or substitutions.     Order Specific Question:   What Home Health Agency is the patient currently using?     Answer:   [facility]        Follow up in about 25 days (around 12/20/2024) for wound care.

## 2024-12-09 ENCOUNTER — HOSPITAL ENCOUNTER (OUTPATIENT)
Dept: CARDIOLOGY | Facility: HOSPITAL | Age: 59
Discharge: HOME | End: 2024-12-09
Attending: SURGERY
Payer: MEDICARE

## 2024-12-09 DIAGNOSIS — N18.6 ESRD (END STAGE RENAL DISEASE) ON DIALYSIS: ICD-10-CM

## 2024-12-09 DIAGNOSIS — Z99.2 ESRD (END STAGE RENAL DISEASE) ON DIALYSIS: ICD-10-CM

## 2024-12-09 LAB
LEFT ABI: 1.83
LEFT ANT TIBIAL SYS PSV: 85 CM/S
LEFT CFA PSV: 47 CM/S
LEFT DORSALIS PEDIS: 255 MMHG
LEFT EXTERNAL ILIAC PSV: 56 CM/S
LEFT PERONEAL SYS PSV: 20 CM/S
LEFT POPLITEAL PSV: 35 CM/S
LEFT POST TIBIAL SYS PSV: 0 CM/S
LEFT POSTERIOR TIBIAL: 255 MMHG
LEFT PROFUNDA SYS PSV: 32 CM/S
LEFT SUPER FEMORAL DIST SYS PSV: 51 CM/S
LEFT SUPER FEMORAL MID SYS PSV: 51 CM/S
LEFT SUPER FEMORAL OSTIAL SYS PSV: 41 CM/S
LEFT SUPER FEMORAL PROX SYS PSV: 50 CM/S
LEFT TIB/PER TRUNK SYS PSV: 67 CM/S
RIGHT ABI: 1.83
RIGHT ANT TIBIAL SYS PSV: 66 CM/S
RIGHT ARM BP: 139 MMHG
RIGHT CFA PSV: 44 CM/S
RIGHT DORSALIS PEDIS: 255 MMHG
RIGHT EXTERNAL ILLIAC PSV: 51 CM/S
RIGHT PERONEAL SYS PSV: 29 CM/S
RIGHT POPLITEAL PSV: 37 CM/S
RIGHT POST TIBIAL SYS PSV: 48 CM/S
RIGHT POSTERIOR TIBIAL: 255 MMHG
RIGHT PROFUNDA SYS PSV: 26 CM/S
RIGHT SUPER FEMORAL DIST SYS PSV: 36 CM/S
RIGHT SUPER FEMORAL MID SYS PSV: 51 CM/S
RIGHT SUPER FEMORAL OSTIAL SYS PSV: 33 CM/S
RIGHT SUPER FEMORAL PROX SYS PSV: 63 CM/S
RIGHT TIB/PER TRUNK SYS PSV: 47 CM/S

## 2024-12-09 PROCEDURE — 93922 UPR/L XTREMITY ART 2 LEVELS: CPT | Mod: 26,GW,, | Performed by: SURGERY

## 2024-12-09 PROCEDURE — 93925 LOWER EXTREMITY STUDY: CPT

## 2024-12-09 PROCEDURE — 93922 UPR/L XTREMITY ART 2 LEVELS: CPT

## 2024-12-09 PROCEDURE — 93925 LOWER EXTREMITY STUDY: CPT | Mod: 26,GW,, | Performed by: SURGERY

## 2024-12-17 NOTE — PROGRESS NOTES
"  Subjective:       Patient ID: Eva Bloom is a 59 y.o. female.    Chief Complaint: RCC    HPI     59 y.o.female to clinic for follow up for metastatic RCC. Patient was previously on hospice but now not enrolled as she wants to continue dialysis. She is feeling stronger, remains off cabozantinib. Pt still is on ESRD with HD every TTS.      ECOG 3. She is accompanied with her . She presents in a wheelchair.      Oncologic History (From Chart and Patient):  Eva Bloom is a 59 y.o.female with ESRD on HD who was found to have two L renal masses. She underwent L lap nephrectomy on 1/12/16. 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 Margins were negative.  She healed from surgery well, but then developed a large ovarian mass.  This was removed by gyn along with multiple sites of metastatic disease in the pelvis.  Path was c/w recurrence of RCC.     11/20/16 Pelvic ultrasound reveals "Large heterogeneous cystic and solid mass which appears to arise from the right ovary with worrisome imaging features for malignancy.  Differential diagnosis includes malignant tumors such as serous or mucinous cystadenocarcinoma.  It is important to note that the patient is at risk for ovarian torsion due to the size of the mass.Multiple uterine fibroids are identified with the largest in the uterine fundus."    11/22/16 pathology reveals "FINAL PATHOLOGIC DIAGNOSIS-RIGHT OVARY AND FALLOPIAN TUBE, RIGHT SALPINGO-OOPHORECTOMY:  -Positive for malignancy, high grade carcinoma morphologically and immunohistochemically consistent with metastasis from the patient's known renal cell carcinoma"    Review of Systems   Constitutional:  Positive for activity change, appetite change, fatigue and unexpected weight change. Negative for chills and fever.   HENT:  Negative for congestion, hearing loss, mouth sores, " postnasal drip, sore throat, tinnitus and voice change.    Eyes:  Negative for pain and visual disturbance.   Respiratory:  Negative for cough, shortness of breath and wheezing.    Cardiovascular:  Negative for chest pain, palpitations and leg swelling.   Gastrointestinal:  Negative for abdominal pain, constipation, diarrhea, nausea and vomiting.   Endocrine: Negative for cold intolerance and heat intolerance.   Genitourinary:  Negative for difficulty urinating, dyspareunia, dysuria, frequency, menstrual problem, urgency, vaginal bleeding, vaginal discharge and vaginal pain.   Musculoskeletal:  Negative for arthralgias and myalgias.        LE ulcerations and pain    Skin:  Negative for color change, rash and wound.   Allergic/Immunologic: Negative for environmental allergies and food allergies.   Neurological:  Negative for weakness, numbness and headaches.   Hematological:  Negative for adenopathy. Does not bruise/bleed easily.   Psychiatric/Behavioral:  Negative for agitation, confusion, hallucinations and sleep disturbance. The patient is not nervous/anxious.    All other systems reviewed and are negative.      Allergies:  Review of patient's allergies indicates:   Allergen Reactions    Allopurinol      Other reaction(s): abnormal transaminases       Medications:  Current Outpatient Medications   Medication Sig Dispense Refill    acetaminophen (TYLENOL) 500 MG tablet Take 500 mg by mouth every 6 (six) hours as needed for Pain.      acetaminophen (TYLENOL) 500 MG tablet Take 1 tablet (500 mg total) by mouth every 4 (four) hours as needed for Pain or Temperature greater than (100.5 or greater). 30 tablet 0    acetaminophen-codeine 300-60mg (TYLENOL #4) 300-60 mg Tab Take 1 tablet by mouth nightly as needed (foot pain). 30 tablet 0    albuterol-ipratropium (DUO-NEB) 2.5 mg-0.5 mg/3 mL nebulizer solution Take by nebulization. (Patient not taking: Reported on 11/6/2024)      apixaban (ELIQUIS) 2.5 mg Tab Take 1 tablet  (2.5 mg total) by mouth 2 (two) times daily. 60 tablet 5    aspirin (ECOTRIN) 81 MG EC tablet Take 1 tablet (81 mg total) by mouth once daily. 90 tablet 2    atorvastatin (LIPITOR) 40 MG tablet Take 1 tablet (40 mg total) by mouth once daily. 90 tablet 1    bisacodyL (DULCOLAX) 10 mg Supp Place 10 mg rectally. (Patient not taking: Reported on 11/6/2024)      cabozantinib (CABOMETYX) 60 mg Tab TAKE ONE TABLET BY MOUTH ONCE DAILY AT THE SAME TIME ON AN EMPTY STOMACH AT LEAST 1 HOUR BEFORE OR 2 HOURS AFTER EATING. AVOID GRAPEFRUIT PRODUCTS (Patient not taking: Reported on 11/6/2024) 30 tablet 4    epoetin david-epbx (RETACRIT INJ)  (Patient not taking: Reported on 11/6/2024)      hyoscyamine (LEVSIN/SL) 0.125 mg Subl Place under the tongue. (Patient not taking: Reported on 11/6/2024)      lactulose (CHRONULAC) 10 gram/15 mL solution Take 15 mLs (10 g total) by mouth 3 (three) times daily as needed (constipation). (Patient not taking: Reported on 11/6/2024) 473 mL 0    lanthanum (FOSRENOL) 1000 MG chewable tablet Take 1 tablet by mouth. (Patient not taking: Reported on 11/6/2024)      levothyroxine (SYNTHROID) 75 MCG tablet Take 1 tablet (75 mcg total) by mouth once daily. 90 tablet 2    lidocaine-prilocaine (EMLA) cream APPLY ATLEAST 30 MINUTES BEFORE TREATMENT 3 TIMES A WEEK (Patient not taking: Reported on 11/6/2024) 30 g 11    LORAZEPAM INTENSOL 2 mg/mL Conc Take by mouth. (Patient not taking: Reported on 11/6/2024)      metoprolol succinate (TOPROL-XL) 25 MG 24 hr tablet Take 1 tablet (25 mg total) by mouth once daily. 90 tablet 0    mirtazapine (REMERON) 7.5 MG Tab Take 1 tablet (7.5 mg total) by mouth every evening. 90 tablet 2    mv,Ca,min-folic acid-vit K1 (ONE-A-DAY WOMEN'S 50 PLUS) 400-20 mcg Tab Take 1 tablet by mouth once daily. (Patient not taking: Reported on 11/6/2024)      naloxone (NARCAN) 1 mg/mL injection 2 mg (1 mg per nostril) by Nasal route as needed for opioid overdose; may repeat in 3 to 5  minutes if not effective. Call 911 (Patient not taking: Reported on 11/6/2024) 2 mL 11    nystatin (MYCOSTATIN) cream SMARTSIG:sparingly Topical 2-4 Times Daily PRN (Patient not taking: Reported on 11/6/2024)      ondansetron (ZOFRAN-ODT) 4 MG TbDL Take by mouth. (Patient not taking: Reported on 11/6/2024)      pantoprazole (PROTONIX) 40 MG tablet Take 1 tablet (40 mg total) by mouth 2 (two) times daily. (Patient not taking: Reported on 11/6/2024) 60 tablet 11     No current facility-administered medications for this visit.     Facility-Administered Medications Ordered in Other Visits   Medication Dose Route Frequency Provider Last Rate Last Admin    0.9%  NaCl infusion   Intravenous Continuous Soni Bhatti MD   New Bag at 07/21/23 1116       PMH:  Past Medical History:   Diagnosis Date    Acute metabolic encephalopathy 01/02/2024    Anemia     Anticoagulant long-term use     Atrial fibrillation     Bronchitis 03/01/2017    Cancer 2016    kidney cancer    CHF (congestive heart failure), NYHA class II, chronic, systolic     CMV (cytomegalovirus) antibody positive     CVA (cerebral vascular accident) 01/03/2024    Encounter for blood transfusion     ESRD (end stage renal disease)     Essential hypertension 09/23/2015    H/O herpes simplex type 2 infection     Herpes simplex type 1 antibody positive     History of kidney cancer     s/p left nephrectomy 1/2016    Hyperparathyroidism, unspecified     Hyperuricemia without signs of inflammatory arthritis and tophaceous disease     Kidney stones     LGSIL (low grade squamous intraepithelial dysplasia)     Myocardiopathy 07/21/2017    Prediabetes     Proteinuria     Renal disorder     Thyroid nodule     Urate nephropathy        PSH:  Past Surgical History:   Procedure Laterality Date    ANGIOGRAPHY OF LOWER EXTREMITY Right 11/11/2024    Procedure: Angiogram Extremity Unilateral;  Surgeon: Corby Casey MD;  Location: [facility] OR [address] [place] [address];  Service: Vascular;   Laterality: Right;  mGy 63.61      Gycm2   11.8042        Fluro time  13.5min      Contrast amount 40ml    ANGIOPLASTY, PERIPHERAL BLOOD VESSEL Right 11/11/2024    Procedure: ANGIOPLASTY, PERIPHERAL BLOOD VESSEL;  Surgeon: Corby Casey MD;  [facility];  Service: Vascular;  Laterality: Right;    BREAST CYST EXCISION      COLONOSCOPY N/A 11/12/2015    COLONOSCOPY N/A 03/12/2021    Procedure: COLONOSCOPY;  Surgeon: Brendon Lanier MD;  Location: [facility] ENDO;  Service: Endoscopy;  Laterality: N/A;  covid test 3/9, labs prior, prep instr mailed -ml    EXCISION OF ARTERIOVENOUS FISTULA Left 09/27/2023    Procedure: EXCISION, AV FISTULA;  Surgeon: FARIDEH Muñoz III, MD;  [facility];  Service: Vascular;  Laterality: Left;  LUE AV graft excision    INSERTION OF BIVENTRICULAR IMPLANTABLE CARDIOVERTER-DEFIBRILLATOR (ICD)  04/2021    INSERTION OF TUNNELED CENTRAL VENOUS CATHETER (CVC) WITH SUBCUTANEOUS PORT N/A 01/25/2023    Procedure: INSERTION, DUAL LUMEN CATHETER WITH PORT, WITH IMAGING GUIDANCE;  Surgeon: FARIDEH Muñoz III, MD;  [facility];  Service: Peripheral Vascular;  Laterality: N/A;  possinble permcath placment    NEPHRECTOMY-LAPAROSCOPIC Left 01/12/2016    PERCUTANEOUS TRANSLUMINAL ANGIOPLASTY OF ARTERIOVENOUS FISTULA Left 04/19/2023    Procedure: PTA, AV FISTULA;  Surgeon: FARIDEH Muñoz III, MD;  Location: [facility] CATH LAB;  Service: Peripheral Vascular;  Laterality: Left;    PERITONEAL CATHETER INSERTION      Permacath insertion  01/12/2017    PLACEMENT OF ARTERIOVENOUS GRAFT  12/28/2022    Procedure: INSERTION, GRAFT, ARTERIOVENOUS;  Surgeon: FARIDEH Muñoz III, MD;  Location: [facility] OR [facility] FLR;  Service: Peripheral Vascular;;    REMOVAL, GRAFT, ARTERIOVENOUS, UPPER EXTREMITY Left 01/25/2023    Procedure: REMOVAL, GRAFT, ARTERIOVENOUS, UPPER EXTREMITY;  Surgeon: FARIDEH Muñoz III, MD;  [facility];  Service: Peripheral Vascular;   Laterality: Left;    REVISION OF ARTERIOVENOUS FISTULA Left 05/01/2023    Procedure: REVISION, AV FISTULA;  Surgeon: FARIDEH Muñoz III, MD;  Location: [facility] OR [facility] FLR;  Service: Peripheral Vascular;  Laterality: Left;  LUE AVG revision    REVISION OF PROCEDURE INVOLVING ARTERIOVENOUS GRAFT Left 12/28/2022    Procedure: REVISION, PROCEDURE INVOLVING ARTERIOVENOUS GRAFT;  Surgeon: FARIDEH Muñoz III, MD;  Location: [facility] OR 2ND FLR;  Service: Peripheral Vascular;  Laterality: Left;    REVISION OF PROCEDURE INVOLVING ARTERIOVENOUS GRAFT Left 07/21/2023    Procedure: LEFT ARM ARTERIOVENOUS GRAFT EXCISION  AND LIGATION;  Surgeon: Obi Werner MD;  Location: [facility] OR;  Service: Vascular;  Laterality: Left;  Left AVG excision and revision with interposition    SALPINGOOPHORECTOMY Right 2016    KJB---DAVINCI    TONSILLECTOMY      TUBAL LIGATION         FamHx:  Family History   Problem Relation Name Age of Onset    Hypertension Mother      Diabetes Father      Kidney disease Father      No Known Problems Sister Sophy     Heart disease Sister Maria A     No Known Problems Sister Claudine     No Known Problems Brother Dax     No Known Problems Brother Don     Cataracts Maternal Aunt      No Known Problems Maternal Uncle      No Known Problems Paternal Aunt      No Known Problems Paternal Uncle      No Known Problems Maternal Grandmother      Diabetes Maternal Grandfather      No Known Problems Paternal Grandmother      No Known Problems Paternal Grandfather      No Known Problems Son      No Known Problems Son      No Known Problems Other      Breast cancer Neg Hx      Colon cancer Neg Hx      Cancer Neg Hx      Stroke Neg Hx      Amblyopia Neg Hx      Blindness Neg Hx      Glaucoma Neg Hx      Macular degeneration Neg Hx      Retinal detachment Neg Hx      Strabismus Neg Hx      Thyroid disease Neg Hx         SocHx:  Social History     Socioeconomic History    Marital status:     Number of children: 2    Occupational History    Occupation: [employer]     Employer: [id number]   Tobacco Use    Smoking status: Never     Passive exposure: Never    Smokeless tobacco: Never   Substance and Sexual Activity    Alcohol use: No    Drug use: Not Currently     Types: Hydrocodone    Sexual activity: Not Currently     Social Drivers of Health     Financial Resource Strain: Low Risk  (9/28/2023)    Overall Financial Resource Strain (CARDIA)     Difficulty of Paying Living Expenses: Not hard at all   Food Insecurity: No Food Insecurity (9/28/2023)    Hunger Vital Sign     Worried About Running Out of Food in the Last Year: Never true     Ran Out of Food in the Last Year: Never true   Transportation Needs: No Transportation Needs (9/28/2023)    PRAPARE - Transportation     Lack of Transportation (Medical): No     Lack of Transportation (Non-Medical): No   Physical Activity: Inactive (9/28/2023)    Exercise Vital Sign     Days of Exercise per Week: 0 days     Minutes of Exercise per Session: 0 min   Stress: No Stress Concern Present (9/28/2023)    [employer] - Occupational Stress Questionnaire     Feeling of Stress : Not at all   Housing Stability: Low Risk  (9/28/2023)    Housing Stability Vital Sign     Unable to Pay for Housing in the Last Year: No     Number of Places Lived in the Last Year: 1     Unstable Housing in the Last Year: No         Objective:     There were no vitals filed for this visit.        Physical Exam  Vitals and nursing note reviewed.   Constitutional:       General: She is not in acute distress.     Appearance: She is well-developed.      Comments: Thin appearance, in wheelchair   HENT:      Head: Normocephalic and atraumatic.      Nose: Nose normal.      Mouth/Throat:      Pharynx: No oropharyngeal exudate.   Eyes:      General:         Right eye: No discharge.         Left eye: No discharge.      Conjunctiva/sclera: Conjunctivae normal.      Pupils: Pupils are equal,  round, and reactive to light.   Neck:      Trachea: No tracheal deviation.   Cardiovascular:      Comments: No swelling to bilateral lower extremities.   Musculoskeletal:         General: No tenderness. Normal range of motion.      Comments:      Skin:     General: Skin is warm and dry.      Coloration: Skin is not pale.      Findings: No erythema or rash.      Comments: Ulcerations on LE   Neurological:      Mental Status: She is alert and oriented to person, place, and time.   Psychiatric:         Behavior: Behavior normal.         Thought Content: Thought content normal.        LABS:  WBC   Date Value Ref Range Status   09/16/2024 6.83 3.90 - 12.70 K/uL Final     Hemoglobin   Date Value Ref Range Status   12/03/2024 10.6 (L) 12.0 - 16.0 g/dL Final   09/16/2024 8.4 (L) 12.0 - 16.0 g/dL Final     POC Hematocrit   Date Value Ref Range Status   10/25/2024 41 36 - 54 %PCV Final     Platelets   Date Value Ref Range Status   09/16/2024 316 150 - 450 K/uL Final       Chemistry        Component Value Date/Time     09/16/2024 1050    K 3.7 09/16/2024 1050     09/16/2024 1050    CO2 24 09/16/2024 1050    BUN 61 (H) 09/16/2024 1050    CREATININE 6.0 (H) 09/16/2024 1050     09/16/2024 1050        Component Value Date/Time    CALCIUM 9.9 09/16/2024 1050    ALKPHOS 647 (H) 09/16/2024 1050    AST 47 (H) 09/16/2024 1050    ALT 27 09/16/2024 1050    BILITOT 0.7 09/16/2024 1050        Ankle Brachial Indices (NUVIA)  Right lower extremity pressures and waveforms indicate moderate arterial   occlusive disease.  Left lower extremity pressures and waveforms indicate moderate arterial   occlusive disease.  Ankle pressures are non-compressible indicating possible medial   calcinosis.  CV Ultrasound doppler arterial legs bilat  Right lower extremity arterial ultrasound shows decreased flow in the   external iliac artery and distally.  Left lower extremity arterial ultrasound shows 50% AT stenosis with an   occluded PT  artery.  There is decreased flow in the external iliac artery   and distally.  Ankle pressures are non-compressible indicating possible medial   calcinosis.      Assessment:       1. Metastatic renal cell carcinoma, left              Plan:        Cancer Staging   Renal cell carcinoma of left kidney  Staging form: Kidney, AJCC 8th Edition  - Pathologic stage from 1/12/2016: Stage Unknown (pT1b(2), pNX, cM0) - Unsigned  - Clinical stage from 11/22/2016: Stage IV (rcTX, cN0, pM1) - Signed by Liset Ngo NP on 9/13/2023    Pt has had SD for long time on [place], and last scans were largely stable, however, no longer able to tolerate therapy due to comorbidities (HD/ESRD, etc) and LE infection/wounds.  She was most recently enrolled on hospice but was then told she was not allowed to continue dialysis. Long discussion with patient and her accompanying  regarding current clinical status and that she does not qualify for further RCC treatment. They are in agreement and would like see current status of cancer activity, which is not unreasonable.     Scans previously showed slow enlargement of retroperitoneal mass which is not surprising that we are currently holding therapy. No new scans or labs this visit.  Will con't to hold therapy and monitor.      RTC 2 months with CT scan, labs (CBC,CMP), and to see Dr. Rivera.  Silva ONLY on Mon/Wed/Fri (pt has dialysis on Tues and Thursday).      Patient is in agreement with the proposed treatment plan. All questions were answered to the patient's satisfaction. Pt knows to call clinic if anything is needed before the next clinic visit.    I, Dr. Ben Rivera, personally spent 40 mins during this encounter.     Ben Rivera M.D., M.S., F.A.C.P.  Hematology/Oncology Attending  [facility]             Med Onc Chart Routing      Follow up with physician . RTC 2 months with CT scan, labs (CBC,CMP), and to see Dr. Wali Ascencio ONLY on Mon/Wed/Fri (pt  has dialysis on Tues and Thursday).   Follow up with [facility]    Infusion scheduling note    Injection scheduling note    Labs    Imaging    Pharmacy appointment    Other referrals

## 2024-12-18 ENCOUNTER — OFFICE VISIT (OUTPATIENT)
Dept: HEMATOLOGY/ONCOLOGY | Facility: CLINIC | Age: 59
End: 2024-12-18
Payer: MEDICARE

## 2024-12-18 VITALS
WEIGHT: 108.25 LBS | SYSTOLIC BLOOD PRESSURE: 126 MMHG | RESPIRATION RATE: 16 BRPM | DIASTOLIC BLOOD PRESSURE: 80 MMHG | BODY MASS INDEX: 18.48 KG/M2 | OXYGEN SATURATION: 100 % | HEART RATE: 88 BPM | HEIGHT: 64 IN

## 2024-12-18 DIAGNOSIS — C64.2 METASTATIC RENAL CELL CARCINOMA, LEFT: Primary | ICD-10-CM

## 2024-12-18 PROCEDURE — 99214 OFFICE O/P EST MOD 30 MIN: CPT | Mod: PBBFAC | Performed by: INTERNAL MEDICINE

## 2024-12-18 PROCEDURE — 99999 PR PBB SHADOW E&M-EST. PATIENT-LVL IV: CPT | Mod: PBBFAC,,, | Performed by: INTERNAL MEDICINE

## 2024-12-18 RX ORDER — OXYCODONE AND ACETAMINOPHEN 5; 325 MG/1; MG/1
1 TABLET ORAL EVERY 4 HOURS PRN
Qty: 60 EACH | Refills: 0 | Status: SHIPPED | OUTPATIENT
Start: 2024-12-18

## 2024-12-19 ENCOUNTER — TELEPHONE (OUTPATIENT)
Dept: WOUND CARE | Facility: HOSPITAL | Age: 59
End: 2024-12-19
Payer: MEDICARE

## 2024-12-20 ENCOUNTER — TELEPHONE (OUTPATIENT)
Dept: VASCULAR SURGERY | Facility: CLINIC | Age: 59
End: 2024-12-20
Payer: MEDICARE

## 2024-12-20 ENCOUNTER — HOSPITAL ENCOUNTER (OUTPATIENT)
Dept: RADIOLOGY | Facility: HOSPITAL | Age: 59
Discharge: HOME | End: 2024-12-20
Attending: PODIATRIST
Payer: MEDICARE

## 2024-12-20 ENCOUNTER — HOSPITAL ENCOUNTER (OUTPATIENT)
Dept: WOUND CARE | Facility: HOSPITAL | Age: 59
Discharge: HOME | End: 2024-12-20
Attending: PODIATRIST
Payer: MEDICARE

## 2024-12-20 VITALS
TEMPERATURE: 97 F | DIASTOLIC BLOOD PRESSURE: 78 MMHG | HEART RATE: 72 BPM | OXYGEN SATURATION: 98 % | SYSTOLIC BLOOD PRESSURE: 141 MMHG | RESPIRATION RATE: 18 BRPM

## 2024-12-20 DIAGNOSIS — L97.429: ICD-10-CM

## 2024-12-20 DIAGNOSIS — I96 GANGRENE OF TOE OF RIGHT FOOT: ICD-10-CM

## 2024-12-20 DIAGNOSIS — Z99.2 ESRD (END STAGE RENAL DISEASE) ON DIALYSIS: ICD-10-CM

## 2024-12-20 DIAGNOSIS — I73.9 PAD (PERIPHERAL ARTERY DISEASE): ICD-10-CM

## 2024-12-20 DIAGNOSIS — L97.529 ISCHEMIC TOE ULCER, LEFT, WITH UNSPECIFIED SEVERITY: ICD-10-CM

## 2024-12-20 DIAGNOSIS — N18.6 ESRD (END STAGE RENAL DISEASE) ON DIALYSIS: ICD-10-CM

## 2024-12-20 DIAGNOSIS — L97.429: Primary | ICD-10-CM

## 2024-12-20 PROCEDURE — 87186 SC STD MICRODIL/AGAR DIL: CPT | Performed by: PODIATRIST

## 2024-12-20 PROCEDURE — 87075 CULTR BACTERIA EXCEPT BLOOD: CPT | Performed by: PODIATRIST

## 2024-12-20 PROCEDURE — 73630 X-RAY EXAM OF FOOT: CPT | Mod: TC,50,FY

## 2024-12-20 PROCEDURE — 87205 SMEAR GRAM STAIN: CPT | Performed by: PODIATRIST

## 2024-12-20 PROCEDURE — 99213 OFFICE O/P EST LOW 20 MIN: CPT | Mod: 25 | Performed by: PODIATRIST

## 2024-12-20 PROCEDURE — 87077 CULTURE AEROBIC IDENTIFY: CPT | Performed by: PODIATRIST

## 2024-12-20 PROCEDURE — 87070 CULTURE OTHR SPECIMN AEROBIC: CPT | Performed by: PODIATRIST

## 2024-12-20 RX ORDER — DOXYCYCLINE HYCLATE 100 MG
100 TABLET ORAL 2 TIMES DAILY
Qty: 20 TABLET | Refills: 0 | Status: SHIPPED | OUTPATIENT
Start: 2024-12-20

## 2024-12-21 LAB
BACTERIA SPEC AEROBE CULT: ABNORMAL
BACTERIA SPEC AEROBE CULT: ABNORMAL
GRAM STN SPEC: NORMAL

## 2024-12-21 NOTE — PROGRESS NOTES
[facility]                Progress Note    Subjective:       Patient ID: Eva Bloom is a 59 y.o. female.    Chief Complaint: Wound Check    Follow Up wound care visit for evaluation of bilateral Ischemic foot ulcers and left heel ulcer. Patient entered via wheelchair pushed by   to [facility]. Patient denies fever, chills, nausea, vomiting or diarrhea at present. Patient c/o  pain/discomfort to wound beds at present. Patient reports she has pain to her feet all the time. Patient reports she sleeps with her  feet dangling from the bed. Patient with bilateral dry eschar to right and left toes and left heel. Changes made to dressing order; applied per MD orders. New orders for x-ray of bilateral feet and culture obtained of left foot drainage. Patient prescribed antibiotics. Instructions given to patient and  about antibiotics. Patient and her  verbalized understanding. Patient will return to [facility] in 1 week. HH will follow up as ordered. AVS printed and  patient was given print out of all future scheduled appointments.         Wound Check    Review of Systems      Objective:        Physical Exam    Vitals:    12/20/24 1009   BP: (!) 141/78   Pulse: 72   Resp: 18   Temp: 97.4 °F (36.3 °C)       Assessment:           ICD-10-CM ICD-9-CM   1. Ischemic ulcer of heel, left, with unspecified severity  L97.429 707.14   2. Ischemic toe ulcer, left, with unspecified severity  L97.529 707.15   3. Gangrene of toe of right foot  I96 785.4            Altered Skin Integrity 02/22/24 0930 Left plantar Heel Ulceration (Active)   02/22/24 0930   Altered Skin Integrity Present on Admission - Did Patient arrive to the hospital with altered skin?:    Side: Left   Orientation: plantar   Location: Heel   Wound Number:    Is this injury device related?:    Primary Wound Type: Ulceration   Description of Altered Skin Integrity:    Ankle-Brachial Index:    Pulses:    Removal  Indication and Assessment:    Wound Outcome:    (Retired) Wound Length (cm):    (Retired) Wound Width (cm):    (Retired) Depth (cm):    Wound Description (Comments):    Removal Indications:    Dressing Appearance No dressing 12/20/24 1851   Drainage Amount None 12/20/24 1851   Appearance Eschar;Dry 12/20/24 1851   Periwound Area Dry 12/20/24 1851   Wound Edges Defined 12/20/24 1851   Care Cleansed with:;Antimicrobial agent;Sterile normal saline 12/20/24 1851   Dressing Applied;Other (comment) 12/20/24 1851   Periwound Care Skin barrier film applied 12/20/24 1851   Dressing Change Due 12/24/24 12/20/24 1851            Wound 07/22/24 Arterial Ulcer Left dorsal Toe, second (Active)   07/22/24  Toe, second   Present on Original Admission: Y   Primary Wound Type: Arterial ulc   Side: Left   Orientation: dorsal   Wound Approximate Age at First Assessment (Weeks):    Wound Number:    Is this injury device related?:    Incision Type:    Closure Method:    Wound Description (Comments):    Type:    Additional Comments:    Ankle-Brachial Index:    Pulses:    Removal Indication and Assessment:    Wound Outcome:    Wound Image   12/20/24 1851   Dressing Appearance No dressing 12/20/24 1851   Drainage Amount Small 12/20/24 1851   Drainage Characteristics/Odor Serosanguineous 12/20/24 1851   Appearance Eschar;Dry 12/20/24 1851   Black (%), Wound Tissue Color 90 % 12/20/24 1851   Yellow (%), Wound Tissue Color 10 % 12/20/24 1851   Periwound Area Dry 12/20/24 1851   Wound Edges Undefined 12/20/24 1851   Care Cleansed with:;Antimicrobial agent;Sterile normal saline 12/20/24 1851   Dressing Applied;Other (comment) 12/20/24 1851   Periwound Care Skin barrier film applied 12/20/24 1851   Dressing Change Due 12/25/24 12/20/24 1851            Wound 07/22/24 Arterial Ulcer Right distal Toe, first (Active)   07/22/24  Toe, first   Present on Original Admission: Y   Primary Wound Type: Arterial ulc   Side: Right   Orientation: distal   Wound  Approximate Age at First Assessment (Weeks):    Wound Number:    Is this injury device related?:    Incision Type:    Closure Method:    Wound Description (Comments):    Type:    Additional Comments:    Ankle-Brachial Index:    Pulses:    Removal Indication and Assessment:    Wound Outcome:    Wound Image   12/20/24 1851   Dressing Appearance No dressing 12/20/24 1851   Drainage Amount None 12/20/24 1851   Appearance Eschar;Dry 12/20/24 1851   Black (%), Wound Tissue Color 90 % 12/20/24 1851   Yellow (%), Wound Tissue Color 10 % 12/20/24 1851   Periwound Area Dry 12/20/24 1851   Care Cleansed with:;Antimicrobial agent;Sterile normal saline 12/20/24 1851   Dressing Applied;Other (comment) 12/20/24 1851   Periwound Care Skin barrier film applied 12/20/24 1851   Dressing Change Due 12/24/24 12/20/24 1851            Wound 10/14/24 0815 Ulceration Left plantar Toe, second (Active)   10/14/24 0815 Toe, second   Present on Original Admission:    Primary Wound Type: Ulceration   Side: Left   Orientation: plantar   Wound Approximate Age at First Assessment (Weeks):    Wound Number:    Is this injury device related?:    Incision Type:    Closure Method:    Wound Description (Comments):    Type:    Additional Comments:    Ankle-Brachial Index:    Pulses:    Removal Indication and Assessment:    Wound Outcome:    Dressing Appearance No dressing 12/20/24 1851   Drainage Amount Small 12/20/24 1851   Drainage Characteristics/Odor Serosanguineous 12/20/24 1851   Appearance Eschar;Dry;Yellow 12/20/24 1851   Black (%), Wound Tissue Color 90 % 12/20/24 1851   Yellow (%), Wound Tissue Color 10 % 12/20/24 1851   Periwound Area Dry 12/20/24 1851   Care Cleansed with:;Antimicrobial agent;Sterile normal saline 12/20/24 1851   Dressing Applied;Other (comment) 12/20/24 1851   Periwound Care Skin barrier film applied 12/20/24 1851   Dressing Change Due 12/24/24 12/20/24 1851            Wound 11/25/24 Arterial Ulcer Left Toe, first (Active)    11/25/24  Toe, first   Present on Original Admission:    Primary Wound Type: Arterial ulc   Side: Left   Orientation:    Wound Approximate Age at First Assessment (Weeks):    Wound Number:    Is this injury device related?:    Incision Type:    Closure Method:    Wound Description (Comments):    Type:    Additional Comments:    Ankle-Brachial Index:    Pulses:    Removal Indication and Assessment:    Wound Outcome:    Wound Image   12/20/24 1851   Dressing Appearance No dressing 12/20/24 1851   Drainage Amount Small 12/20/24 1851   Drainage Characteristics/Odor Serosanguineous 12/20/24 1851   Appearance Black;Yellow;Dry;Eschar 12/20/24 1851   Black (%), Wound Tissue Color 50 % 12/20/24 1851   Yellow (%), Wound Tissue Color 50 % 12/20/24 1851   Periwound Area Dry 12/20/24 1851   Wound Edges Irregular 12/20/24 1851   Care Cleansed with:;Antimicrobial agent;Sterile normal saline 12/20/24 1851   Dressing Applied;Other (comment) 12/20/24 1851   Periwound Care Skin barrier film applied 12/20/24 1851   Dressing Change Due 12/24/24 12/20/24 1851            Wound 11/25/24 Arterial Ulcer Right medial Toe, second (Active)   11/25/24  Toe, second   Present on Original Admission:    Primary Wound Type: Arterial ulc   Side: Right   Orientation: medial   Wound Approximate Age at First Assessment (Weeks):    Wound Number:    Is this injury device related?:    Incision Type:    Closure Method:    Wound Description (Comments):    Type:    Additional Comments:    Ankle-Brachial Index:    Pulses:    Removal Indication and Assessment:    Wound Outcome:    Wound Image   12/20/24 1851   Drainage Amount Small 12/20/24 1851   Drainage Characteristics/Odor Serous 12/20/24 1851   Appearance Pink;White;Eschar;Moist 12/20/24 1851   Black (%), Wound Tissue Color 60 % 12/20/24 1851   Red (%), Wound Tissue Color 40 % 12/20/24 1851   Periwound Area Moist 12/20/24 1851   Wound Edges Defined 12/20/24 1851   Care Cleansed with:;Antimicrobial  agent;Sterile normal saline 12/20/24 1851   Dressing Applied;Other (comment) 12/20/24 1851   Periwound Care Skin barrier film applied 12/20/24 1851   Dressing Change Due 12/24/24 12/20/24 1851           Plan:              Tissue pathology and/or culture taken     [x] Yes      [] No  Sharp debridement performed                   [] Yes       [x] No  Labs ordered     [] Yes       [x] No  Imaging ordered    [x] Yes      [] No    Orders Placed This Encounter   Procedures    Gram stain     Standing Status:   Standing     Number of Occurrences:   1    Culture, Anaerobe     Standing Status:   Standing     Number of Occurrences:   1    Aerobic culture     Standing Status:   Standing     Number of Occurrences:   1    X-Ray Foot Complete Bilateral     Standing Status:   Future     Number of Occurrences:   1     Standing Expiration Date:   12/20/2025     Order Specific Question:   Reason for Exam:     Answer:   multiple wounds, gangrenous change danny    Change dressing     Wound Dressing Orders    Dressing change frequency twice a week and HH will visit patient when not seen in clinic  Remove old dressing  Cleanse or irrigate with: Normal Saline    Left 1st-3rd Toes, Right 1st-2nd Toes   Clean with Normal Saline  Patient will paint with betadine daily. Leave open to air.  Cast padding between toes.    Left Heel   Clean with Normal Saline  Primary dressing: Iodosorb to wound bed, Betadine soak adaptic  Secondary dressing: 4x8 Jeremias,Football with toes out  (cast padding x 3), apply flexinet  Give Green heel protectors.    Return to clinic in  1 week.    SUBSEQUENT HOME HEALTH ORDERS     Home Health for Wound Care. Visit twice a week. Patient will return to clinic on 12/20/24      Wound Dressing Orders    Dressing change frequency twice a week and HH will visit patient when not seen in clinic  Remove old dressing  Cleanse or irrigate with: Normal Saline    Left 1st-3rd Toes, Right 1st-2nd Toes   Clean with Normal  Saline  Patient will paint with betadine daily. Leave open to air.  Cast padding between toes.    Left Heel   Clean with Normal Saline  Primary dressing: Iodosorb to wound bed, Betadine soak adaptic  Secondary dressing: 4x8 Jeremias,Football with toes out  (cast padding x 3), apply flexinet  Give Green heel protectors.    Patient is using betadine to treat wounds on foot, daily. Leave open to air. Please provide supplies for daily changes.    Evaluate for acute changes (purulence, increased redness/swelling, increased drainage, malodor, increased pain, pallor, necrosis) please contact physician on any acute changes.    DO NOT DEVIATE FROM ORDER. PLEASE ORDER SUPPLIES NEED TO APPLY TO PATIENT'S WOUNDS. Call [facility] at [phone] if there are any questions about dressing application or substitutions.     Order Specific Question:   What Home Health Agency is the patient currently using?     Answer:   [facility]        Follow up in about 1 week (around 12/27/2024) for Wound Care.        patient was given print out of all future scheduled appointments.     I spent a total of 46 minutes on the day of the visit.This includes face to face time and non-face to face time preparing to see the patient (eg, review of tests), obtaining and/or reviewing separately obtained history, documenting clinical information in the electronic or other health record, independently interpreting results and communicating results to the patient/family/caregiver, or care coordinator.     Tissue pathology and/or culture taken     [x] Yes      [] No  Sharp debridement performed                   [] Yes       [x] No  Labs ordered     [] Yes       [x] No  Imaging ordered    [x] Yes      [] No    Orders Placed This Encounter   Procedures    Gram stain     Standing Status:   Standing     Number of Occurrences:   1    Culture, Anaerobe     Standing Status:   Standing     Number of Occurrences:   1    Aerobic culture     Standing Status:   Standing     Number of Occurrences:   1    X-Ray Foot Complete Bilateral     Standing Status:   Future     Number of Occurrences:   1     Standing Expiration Date:   12/20/2025     Order Specific Question:   Reason for Exam:     Answer:   multiple wounds, gangrenous change danny    Change dressing     Wound Dressing Orders    Dressing change frequency twice a week and HH will visit patient when not seen in clinic  Remove old dressing  Cleanse or irrigate with: Normal Saline    Left 1st-3rd Toes, Right 1st-2nd Toes   Clean with Normal Saline  Patient will paint with betadine daily. Leave open to air.  Cast padding between toes.    Left Heel   Clean with Normal Saline  Primary dressing: Iodosorb to wound bed, Betadine soak adaptic  Secondary dressing: 4x8 Jeremias,Football with toes out  (cast padding x 3), apply flexinet  Give Green heel protectors.    Return to clinic in  1 week.    SUBSEQUENT HOME HEALTH ORDERS     Home Health for Wound Care. Visit twice a week. Patient will return to clinic on  12/20/24      Wound Dressing Orders    Dressing change frequency twice a week and HH will visit patient when not seen in clinic  Remove old dressing  Cleanse or irrigate with: Normal Saline    Left 1st-3rd Toes, Right 1st-2nd Toes   Clean with Normal Saline  Patient will paint with betadine daily. Leave open to air.  Cast padding between toes.    Left Heel   Clean with Normal Saline  Primary dressing: Iodosorb to wound bed, Betadine soak adaptic  Secondary dressing: 4x8 Jeremias,Football with toes out  (cast padding x 3), apply flexinet  Give Green heel protectors.    Patient is using betadine to treat wounds on foot, daily. Leave open to air. Please provide supplies for daily changes.    Evaluate for acute changes (purulence, increased redness/swelling, increased drainage, malodor, increased pain, pallor, necrosis) please contact physician on any acute changes.    DO NOT DEVIATE FROM ORDER. PLEASE ORDER SUPPLIES NEED TO APPLY TO PATIENT'S WOUNDS. Call [facility] at [phone] if there are any questions about dressing application or substitutions.     Order Specific Question:   What Home Health Agency is the patient currently using?     Answer:   [facility]        Follow up in about 1 week (around 12/27/2024) for Wound Care.

## 2024-12-23 ENCOUNTER — OFFICE VISIT (OUTPATIENT)
Dept: VASCULAR SURGERY | Facility: CLINIC | Age: 59
End: 2024-12-23
Payer: MEDICARE

## 2024-12-23 ENCOUNTER — TELEPHONE (OUTPATIENT)
Dept: WOUND CARE | Facility: HOSPITAL | Age: 59
End: 2024-12-23
Payer: MEDICARE

## 2024-12-23 VITALS
HEIGHT: 64 IN | DIASTOLIC BLOOD PRESSURE: 76 MMHG | HEART RATE: 89 BPM | SYSTOLIC BLOOD PRESSURE: 117 MMHG | BODY MASS INDEX: 18.1 KG/M2 | WEIGHT: 106 LBS

## 2024-12-23 DIAGNOSIS — I73.9 PAD (PERIPHERAL ARTERY DISEASE): ICD-10-CM

## 2024-12-23 DIAGNOSIS — L97.429 HEEL ULCER, LEFT, WITH UNSPECIFIED SEVERITY: ICD-10-CM

## 2024-12-23 DIAGNOSIS — L97.529 ISCHEMIC TOE ULCER, LEFT, WITH UNSPECIFIED SEVERITY: ICD-10-CM

## 2024-12-23 DIAGNOSIS — Z99.2 ESRD (END STAGE RENAL DISEASE) ON DIALYSIS: Primary | ICD-10-CM

## 2024-12-23 DIAGNOSIS — L97.529 ISCHEMIC TOE ULCER, LEFT, WITH UNSPECIFIED SEVERITY: Primary | ICD-10-CM

## 2024-12-23 DIAGNOSIS — N18.6 ESRD (END STAGE RENAL DISEASE) ON DIALYSIS: Primary | ICD-10-CM

## 2024-12-23 LAB
BACTERIA SPEC AEROBE CULT: ABNORMAL
BACTERIA SPEC AEROBE CULT: ABNORMAL

## 2024-12-23 PROCEDURE — 99999 PR PBB SHADOW E&M-EST. PATIENT-LVL III: CPT | Mod: PBBFAC,,, | Performed by: SURGERY

## 2024-12-23 PROCEDURE — 99213 OFFICE O/P EST LOW 20 MIN: CPT | Mod: PBBFAC | Performed by: SURGERY

## 2024-12-23 RX ORDER — CEFAZOLIN SODIUM 2 G/50ML
2 SOLUTION INTRAVENOUS ONCE
OUTPATIENT
Start: 2024-12-23 | End: 2024-12-23

## 2024-12-23 RX ORDER — CLINDAMYCIN HYDROCHLORIDE 300 MG/1
300 CAPSULE ORAL EVERY 8 HOURS
Qty: 30 CAPSULE | Refills: 0 | Status: SHIPPED | OUTPATIENT
Start: 2024-12-23 | End: 2025-01-02

## 2024-12-23 RX ORDER — CIPROFLOXACIN 250 MG/1
250 TABLET, FILM COATED ORAL DAILY
Qty: 10 TABLET | Refills: 0 | Status: SHIPPED | OUTPATIENT
Start: 2024-12-23

## 2024-12-23 NOTE — PROGRESS NOTES
VASCULAR SURGERY SERVICE    CHIEF COMPLAINT:  Functional left AV graft dysfunctional left AV graft    HISTORY OF PRESENT ILLNESS: Eva Bloom is a 59 y.o. female end-stage renal disease, with multiple serious medical comorbidities including admission for sepsis 2 months ago, EF 20%, AFib on Eliquis, who is having increasing bleeding after her dialysis runs.  I placed a revision, left AV graft with long interposition Accu Seal 12/28/2022.  She has had uninterrupted use of this graft ever since.    Recent weeks she is had increased bleeding after dialysis sticks    S/p:    1aaa.  Excision, left AV graft for infection 9/27/2023 (Toni) (07/21/2023, Dr. Werner)  1aa. Reclosure, L AVG conter-incision 5/1/2023  1a. PTA, L AVG 4/19/2023  Excision, excluded L AVG and permacath 1/25/2023  revision, left AV graft with long interposition Accu Seal 12/28/2022  Original left AV graft 2017 with subsequent covered stent placement in venous outflow    09/1/2023:  She now returns after excision of the left AV graft for infection proximally 5 weeks ago.  She is been dialyzing through a PermCath.    09/22/2023:  Now returns with breakdown of prior counter incision for graft excision.  Notably, she has metastatic renal cell carcinoma to the lungs    10/20/2023:  She now returns after excision of the left AV graft remnant.  She is continued to deteriorate with weight loss, now BMI 13, was recently hospitalized for her declining state, has now been put on hospice.  This point she is still dialyzing through a PermCath    2/2024:  referred by Dr. Farooq for L 3rd toe wound.  Pt has resumed Oncologic care after stopping hospice care and desires to proceed with ulcer healing treatment.    4/2024:  +wound care with healing wounds.    7/2024:  stronger, wounds improving, HD via catheter without issues.    9/2024:  walking with walker, HD going well.  Wound care without signs of infection.    12/2024: s/p R AT PTA 11/11/24.  Cont  to have R toe pain, seeing Podiatry for wound care.      Past Medical History:   Diagnosis Date    Acute metabolic encephalopathy 01/02/2024    Anemia     Anticoagulant long-term use     Atrial fibrillation     Bronchitis 03/01/2017    Cancer 2016    kidney cancer    CHF (congestive heart failure), NYHA class II, chronic, systolic     CMV (cytomegalovirus) antibody positive     CVA (cerebral vascular accident) 01/03/2024    Encounter for blood transfusion     ESRD (end stage renal disease)     Essential hypertension 09/23/2015    H/O herpes simplex type 2 infection     Herpes simplex type 1 antibody positive     History of kidney cancer     s/p left nephrectomy 1/2016    Hyperparathyroidism, unspecified     Hyperuricemia without signs of inflammatory arthritis and tophaceous disease     Kidney stones     LGSIL (low grade squamous intraepithelial dysplasia)     Myocardiopathy 07/21/2017    Prediabetes     Proteinuria     Renal disorder     Thyroid nodule     Urate nephropathy        Past Surgical History:   Procedure Laterality Date    ANGIOGRAPHY OF LOWER EXTREMITY Right 11/11/2024    Procedure: Angiogram Extremity Unilateral;  Surgeon: Corby Casey MD;  Location: [address];  Service: Vascular;  Laterality: Right;  mGy 63.61      Gycm2   11.8042        Fluro time  13.5min      Contrast amount 40ml    ANGIOPLASTY, PERIPHERAL BLOOD VESSEL Right 11/11/2024    Procedure: ANGIOPLASTY, PERIPHERAL BLOOD VESSEL;  Surgeon: Corby Casey MD;  Location: [address];  Service: Vascular;  Laterality: Right;    BREAST CYST EXCISION      COLONOSCOPY N/A 11/12/2015    COLONOSCOPY N/A 03/12/2021    Procedure: COLONOSCOPY;  Surgeon: Brendon Lanier MD;  Location: [facility] ENDO;  Service: Endoscopy;  Laterality: N/A;  covid test 3/9, labs prior, prep instr mailed -ml    EXCISION OF ARTERIOVENOUS FISTULA Left 09/27/2023    Procedure: EXCISION, AV FISTULA;  Surgeon: FARIDEH Muñoz III, MD;  Location: [facility] OR  2ND FLR;  Service: Vascular;  Laterality: Left;  LUE AV graft excision    INSERTION OF BIVENTRICULAR IMPLANTABLE CARDIOVERTER-DEFIBRILLATOR (ICD)  04/2021    INSERTION OF TUNNELED CENTRAL VENOUS CATHETER (CVC) WITH SUBCUTANEOUS PORT N/A 01/25/2023    Procedure: INSERTION, DUAL LUMEN CATHETER WITH PORT, WITH IMAGING GUIDANCE;  Surgeon: FARIDEH Muñoz III, MD;  Location: [facility] OR [facility] FLR;  Service: Peripheral Vascular;  Laterality: N/A;  possinble permcath placment    NEPHRECTOMY-LAPAROSCOPIC Left 01/12/2016    PERCUTANEOUS TRANSLUMINAL ANGIOPLASTY OF ARTERIOVENOUS FISTULA Left 04/19/2023    Procedure: PTA, AV FISTULA;  Surgeon: FARIDEH Muñoz III, MD;  Location: [facility] CATH LAB;  Service: Peripheral Vascular;  Laterality: Left;    PERITONEAL CATHETER INSERTION      Permacath insertion  01/12/2017    PLACEMENT OF ARTERIOVENOUS GRAFT  12/28/2022    Procedure: INSERTION, GRAFT, ARTERIOVENOUS;  Surgeon: FARIDEH Muñoz III, MD;  [facility];  Service: Peripheral Vascular;;    REMOVAL, GRAFT, ARTERIOVENOUS, UPPER EXTREMITY Left 01/25/2023    Procedure: REMOVAL, GRAFT, ARTERIOVENOUS, UPPER EXTREMITY;  Surgeon: FARIDEH Muñoz III, MD;  Location: [facility] OR [facility] FLR;  Service: Peripheral Vascular;  Laterality: Left;    REVISION OF ARTERIOVENOUS FISTULA Left 05/01/2023    Procedure: REVISION, AV FISTULA;  Surgeon: FARIDEH Muñoz III, MD;  Location: [facility] OR [facility] FLR;  Service: Peripheral Vascular;  Laterality: Left;  LUE AVG revision    REVISION OF PROCEDURE INVOLVING ARTERIOVENOUS GRAFT Left 12/28/2022    Procedure: REVISION, PROCEDURE INVOLVING ARTERIOVENOUS GRAFT;  Surgeon: FARIDEH Muñoz III, MD;  [facility];  Service: Peripheral Vascular;  Laterality: Left;    REVISION OF PROCEDURE INVOLVING ARTERIOVENOUS GRAFT Left 07/21/2023    Procedure: LEFT ARM ARTERIOVENOUS GRAFT EXCISION  AND LIGATION;  Surgeon: Obi Werner MD;  Location: [facility] OR;  Service: Vascular;  Laterality: Left;  Left  AVG excision and revision with interposition    SALPINGOOPHORECTOMY Right 2016    KJB---DAVINCI    TONSILLECTOMY      TUBAL LIGATION           Current Outpatient Medications:     acetaminophen (TYLENOL) 500 MG tablet, Take 500 mg by mouth every 6 (six) hours as needed for Pain., Disp: , Rfl:     acetaminophen (TYLENOL) 500 MG tablet, Take 1 tablet (500 mg total) by mouth every 4 (four) hours as needed for Pain or Temperature greater than (100.5 or greater)., Disp: 30 tablet, Rfl: 0    albuterol-ipratropium (DUO-NEB) 2.5 mg-0.5 mg/3 mL nebulizer solution, Take by nebulization., Disp: , Rfl:     apixaban (ELIQUIS) 2.5 mg Tab, Take 1 tablet (2.5 mg total) by mouth 2 (two) times daily., Disp: 60 tablet, Rfl: 5    aspirin (ECOTRIN) 81 MG EC tablet, Take 1 tablet (81 mg total) by mouth once daily., Disp: 90 tablet, Rfl: 2    atorvastatin (LIPITOR) 40 MG tablet, Take 1 tablet (40 mg total) by mouth once daily., Disp: 90 tablet, Rfl: 1    bisacodyL (DULCOLAX) 10 mg Supp, Place 10 mg rectally., Disp: , Rfl:     cabozantinib (CABOMETYX) 60 mg Tab, TAKE ONE TABLET BY MOUTH ONCE DAILY AT THE SAME TIME ON AN EMPTY STOMACH AT LEAST 1 HOUR BEFORE OR 2 HOURS AFTER EATING. AVOID GRAPEFRUIT PRODUCTS, Disp: 30 tablet, Rfl: 4    doxycycline (VIBRA-TABS) 100 MG tablet, Take 1 tablet (100 mg total) by mouth 2 (two) times daily., Disp: 20 tablet, Rfl: 0    epoetin david-epbx (RETACRIT INJ), , Disp: , Rfl:     hyoscyamine (LEVSIN/SL) 0.125 mg Subl, Place under the tongue., Disp: , Rfl:     lactulose (CHRONULAC) 10 gram/15 mL solution, Take 15 mLs (10 g total) by mouth 3 (three) times daily as needed (constipation)., Disp: 473 mL, Rfl: 0    lanthanum (FOSRENOL) 1000 MG chewable tablet, Take 1 tablet by mouth., Disp: , Rfl:     levothyroxine (SYNTHROID) 75 MCG tablet, Take 1 tablet (75 mcg total) by mouth once daily., Disp: 90 tablet, Rfl: 2    lidocaine-prilocaine (EMLA) cream, APPLY ATLEAST 30 MINUTES BEFORE TREATMENT 3 TIMES A WEEK, Disp:  30 g, Rfl: 11    LORAZEPAM INTENSOL 2 mg/mL Conc, Take by mouth., Disp: , Rfl:     metoprolol succinate (TOPROL-XL) 25 MG 24 hr tablet, Take 1 tablet (25 mg total) by mouth once daily., Disp: 90 tablet, Rfl: 0    mirtazapine (REMERON) 7.5 MG Tab, Take 1 tablet (7.5 mg total) by mouth every evening., Disp: 90 tablet, Rfl: 2    mv,Ca,min-folic acid-vit K1 (ONE-A-DAY WOMEN'S 50 PLUS) 400-20 mcg Tab, Take 1 tablet by mouth once daily., Disp: , Rfl:     naloxone (NARCAN) 1 mg/mL injection, 2 mg (1 mg per nostril) by Nasal route as needed for opioid overdose; may repeat in 3 to 5 minutes if not effective. Call 911, Disp: 2 mL, Rfl: 11    nystatin (MYCOSTATIN) cream, , Disp: , Rfl:     ondansetron (ZOFRAN-ODT) 4 MG TbDL, Take by mouth., Disp: , Rfl:     oxyCODONE-acetaminophen (PERCOCET) 5-325 mg per tablet, Take 1 tablet by mouth every 4 (four) hours as needed for Pain., Disp: 60 each, Rfl: 0    ciprofloxacin HCl (CIPRO) 250 MG tablet, Take 1 tablet (250 mg total) by mouth once daily., Disp: 10 tablet, Rfl: 0    clindamycin (CLEOCIN) 300 MG capsule, Take 1 capsule (300 mg total) by mouth every 8 (eight) hours. for 10 days, Disp: 30 capsule, Rfl: 0    pantoprazole (PROTONIX) 40 MG tablet, Take 1 tablet (40 mg total) by mouth 2 (two) times daily. (Patient not taking: Reported on 11/6/2024), Disp: 60 tablet, Rfl: 11  No current facility-administered medications for this visit.    Facility-Administered Medications Ordered in Other Visits:     0.9%  NaCl infusion, , Intravenous, Continuous, Soni Bhatti MD, [place] at 07/21/23 1116    Review of patient's allergies indicates:   Allergen Reactions    Carvedilol Other (See Comments)     Nausea/vomiting    Allopurinol      Other reaction(s): abnormal transaminases       Family History   Problem Relation Name Age of Onset    Hypertension Mother      Diabetes Father      Kidney disease Father      No Known Problems Sister Sophy     Heart disease Sister Maria A     No Known Problems  "Sister Claudine     No Known Problems Brother Dax     No Known Problems Brother Don     Cataracts Maternal Aunt      No Known Problems Maternal Uncle      No Known Problems Paternal Aunt      No Known Problems Paternal Uncle      No Known Problems Maternal Grandmother      Diabetes Maternal Grandfather      No Known Problems Paternal Grandmother      No Known Problems Paternal Grandfather      No Known Problems Son      No Known Problems Son      No Known Problems Other      Breast cancer Neg Hx      Colon cancer Neg Hx      Cancer Neg Hx      Stroke Neg Hx      Amblyopia Neg Hx      Blindness Neg Hx      Glaucoma Neg Hx      Macular degeneration Neg Hx      Retinal detachment Neg Hx      Strabismus Neg Hx      Thyroid disease Neg Hx         Social History     Tobacco Use    Smoking status: Never     Passive exposure: Never    Smokeless tobacco: Never   Substance Use Topics    Alcohol use: No    Drug use: Not Currently     Types: Hydrocodone       PHYSICAL EXAM:   /76 (BP Location: Right arm, Patient Position: Sitting)   Pulse 89   Ht 5' 4" (1.626 m)   Wt 48.1 kg (106 lb)   LMP 10/24/2016   BMI 18.19 kg/m²   Constitutional:  She appears very frail and deconditioned   Neurological: Normal speech  no focal findings  CN II - XII grossly intact.    Psychiatric: Mood and affect appropriate and symmetric.   HEENT: Normocephalic / atraumatic  PERRLA  Midline trachea  No scars across the neck   Cardiac: Regular rate and rhythm.   Pulmonary: Normal pulmonary effort.   Abdomen: Soft, not distended.     Skin: Warm and well perfused.    Vascular:  Radial pulses are nonpalpable bilaterally.   Extremities/  Musculoskeletal: No edema.   Right arm inc well healed, bilateral toes and L heel with edema and dry ischemic changes       IMAGIN/2024  Right lower extremity pressures and waveforms indicate moderate to severe arterial occlusive disease.  Left lower extremity pressures and waveforms indicate moderate to " severe arterial occlusive disease.  Ankle pressures are non-compressible indicating possible medial calcinosis.     Left lower extremity arterial ultrasound shows decreased external iliac artery flow indicating proximal stenosis with decreased distal femoropopliteal artery flow, heavily calcified arteries, an occluded PT and peroneal arteries.     IMPRESSION:   Status post excision, left AV graft remnant.    Patient's medical status continued to deteriorate and now off home hospice  AFib on Eliquis  Severe systolic dysfunction EF 20%  Metastatic renal cell to lung    12/2024  Right lower extremity pressures and waveforms indicate moderate arterial occlusive disease.  Left lower extremity pressures and waveforms indicate moderate arterial occlusive disease.  Ankle pressures are non-compressible indicating possible medial calcinosis.     Right lower extremity arterial ultrasound shows decreased flow in the external iliac artery and distally.  Left lower extremity arterial ultrasound shows 50% AT stenosis with an occluded PT artery.  There is decreased flow in the external iliac artery and distally.  Ankle pressures are non-compressible indicating possible medial calcinosis.       PLAN  -NUVIA / TP show moderate to severe arterial occlusive disease s/p L AT/PT PTA 7/2023 with improved waveforms - cont with Podiatry mgmt  -Wound care recs  -rec LLE angiogram possible intervention, 1/15/25 WB    I spent 11 minutes evaluating this patient and greater than 50% of the time was spent counseling, coordinator care and discussing the plan of care.  All questions were answered and patient stated understanding with agreement with the above treatment plan.    Corby Casey M.D., R.P.V.I. [facility]

## 2024-12-23 NOTE — TELEPHONE ENCOUNTER
Spoke to patient's  Otis, advised patient has 2 prescriptions at the pharmacy. She will need to take as prescribed. Explained dosing as per DPM message below. He verbalized understanding.

## 2024-12-23 NOTE — TELEPHONE ENCOUNTER
----- Message from Ginger Mandel DPM sent at 12/23/2024  8:46 AM CST -----  Culture result positive for multiple bacteria that require separate antibiotics and had to be dosed based on patient's renal function.  Rx for clindamycin 3 times a day and ciprofloxacin once a day for 10 days sent to her pharmacy

## 2024-12-24 ENCOUNTER — TELEPHONE (OUTPATIENT)
Dept: PODIATRY | Facility: CLINIC | Age: 59
End: 2024-12-24
Payer: MEDICARE

## 2024-12-26 LAB — BACTERIA SPEC ANAEROBE CULT: NORMAL

## 2024-12-27 ENCOUNTER — HOSPITAL ENCOUNTER (OUTPATIENT)
Dept: WOUND CARE | Facility: HOSPITAL | Age: 59
Discharge: HOME | End: 2024-12-27
Attending: PODIATRIST
Payer: MEDICARE

## 2024-12-27 VITALS
TEMPERATURE: 97 F | SYSTOLIC BLOOD PRESSURE: 125 MMHG | HEART RATE: 74 BPM | RESPIRATION RATE: 16 BRPM | DIASTOLIC BLOOD PRESSURE: 69 MMHG

## 2024-12-27 DIAGNOSIS — Z99.2 ESRD (END STAGE RENAL DISEASE) ON DIALYSIS: ICD-10-CM

## 2024-12-27 DIAGNOSIS — N18.6 ESRD (END STAGE RENAL DISEASE) ON DIALYSIS: ICD-10-CM

## 2024-12-27 DIAGNOSIS — I96 GANGRENE OF TOE OF RIGHT FOOT: ICD-10-CM

## 2024-12-27 DIAGNOSIS — L97.529 ISCHEMIC TOE ULCER, LEFT, WITH UNSPECIFIED SEVERITY: ICD-10-CM

## 2024-12-27 DIAGNOSIS — I73.9 PAD (PERIPHERAL ARTERY DISEASE): ICD-10-CM

## 2024-12-27 DIAGNOSIS — L97.429: Primary | ICD-10-CM

## 2024-12-27 PROCEDURE — 99214 OFFICE O/P EST MOD 30 MIN: CPT | Performed by: PODIATRIST

## 2024-12-27 PROCEDURE — 99214 OFFICE O/P EST MOD 30 MIN: CPT | Mod: GW,,, | Performed by: PODIATRIST

## 2024-12-27 NOTE — PROGRESS NOTES
[facility]                Progress Note    Subjective:       Patient ID: Eva Bloom is a 59 y.o. female.    Chief Complaint: Wound Check    Follow Up wound care visit for evaluation of bilateral Ischemic foot ulcers and left heel ulcer. Patient entered via wheelchair pushed by   to [facility]. Patient denies fever, chills, nausea, vomiting or diarrhea at present. Patient c/o  pain/discomfort to wound beds at present. Patient reports she has pain to her feet all the time. Patient reports she sleeps with her feet dangling from the bed. She relates that she has been having diarrhea for the last week and may or may not be secondary to abx.    Wound Check      Review of Systems   Constitutional:  Positive for activity change, appetite change and fatigue. Negative for chills and fever.   Respiratory:  Negative for cough and shortness of breath.    Cardiovascular:  Positive for leg swelling. Negative for chest pain.   Gastrointestinal:  Positive for diarrhea. Negative for nausea and vomiting.   Musculoskeletal:  Positive for arthralgias.   Skin:  Positive for color change and wound.   Neurological:  Negative for weakness.        + paresthesia          Objective:        Physical Exam  Vitals and nursing note reviewed.   Constitutional:       General: She is not in acute distress.     Appearance: She is well-developed. She is not toxic-appearing or diaphoretic.      Comments: alert and oriented x 3.    Cardiovascular:      Comments: Dorsalis pedis and posterior tibial pulses are diminished Bilaterally. Toes are cool to touch. Feet are warm proximally.There is decreased digital hair. Skin is atrophic, hyperpigmented, and mildly edematous      Pulmonary:      Effort: No respiratory distress.   Musculoskeletal:         General: No deformity.      Right ankle: No tenderness. No lateral malleolus, medial malleolus, AITF ligament, CF ligament or posterior TF ligament tenderness.       Right Achilles Tendon: No defects. Manning's test negative.      Left ankle: No tenderness. No lateral malleolus, medial malleolus, AITF ligament, CF ligament or posterior TF ligament tenderness.      Left Achilles Tendon: No defects. Manning's test negative.      Right foot: Tenderness present. No bony tenderness.      Left foot: Tenderness present. No bony tenderness.      Comments: Muscle strength is 5/5 in all groups bilaterally.    Fat pad atrophy to heels and met heads bilateral    Patient crossing and moving her legs to increase gravity to toes       Feet:      Right foot:      Skin integrity: Ulcer and dry skin present.      Left foot:      Skin integrity: Ulcer and dry skin present.   Lymphadenopathy:      Comments: No lymphatic streaking     Skin:     General: Skin is cool.      Capillary Refill: Capillary refill takes more than 3 seconds.      Coloration: Skin is not pale.      Findings: No rash.   Neurological:      Sensory: No sensory deficit.      Motor: No atrophy.      Comments: Light touch present     Psychiatric:         Attention and Perception: She is attentive.         Mood and Affect: Mood is not anxious. Affect is not inappropriate.         Speech: She is communicative. Speech is not slurred.         Behavior: Behavior is not combative.         Vitals:    12/27/24 1113   BP: 125/69   Pulse: 74   Resp: 16   Temp: 97.4 °F (36.3 °C)         Assessment:           ICD-10-CM ICD-9-CM   1. Ischemic ulcer of heel, left, with unspecified severity  L97.429 707.14   2. Ischemic toe ulcer, left, with unspecified severity  L97.529 707.15   3. Gangrene of toe of right foot  I96 785.4   4. PAD (peripheral artery disease)  I73.9 443.9   5. ESRD (end stage renal disease) on dialysis  N18.6 585.6    Z99.2 V45.11          Altered Skin Integrity 02/22/24 0930 Left dorsal Toe, second Ulceration (Active)   02/22/24 0930   Altered Skin Integrity Present on Admission - Did Patient arrive to the hospital with  altered skin?: yes   Side: Left   Orientation: dorsal   Location: Toe, second   Wound Number:    Is this injury device related?:    Primary Wound Type: Ulceration   Description of Altered Skin Integrity:    Ankle-Brachial Index:    Pulses:    Removal Indication and Assessment:    Wound Outcome:    (Retired) Wound Length (cm):    (Retired) Wound Width (cm):    (Retired) Depth (cm):    Wound Description (Comments):    Removal Indications:    Wound Image   12/27/24 1209   Dressing Appearance Open to air 12/27/24 1209   Appearance Red;Yellow;Eschar;Dry;Black 12/27/24 1209   Black (%), Wound Tissue Color 80 % 12/27/24 1209   Red (%), Wound Tissue Color 10 % 12/27/24 1209   Yellow (%), Wound Tissue Color 10 % 12/27/24 1209   Periwound Area Dry 12/27/24 1209   Wound Edges Irregular 12/27/24 1209   Wound Length (cm) 1.7 cm 12/27/24 1209   Wound Width (cm) 1.5 cm 12/27/24 1209   Wound Surface Area (cm^2) 2.55 cm^2 12/27/24 1209   Care Cleansed with:;Antimicrobial agent;Sterile normal saline 12/27/24 1209   Dressing Other (comment) 12/27/24 1209   Periwound Care Topical treatment applied 12/27/24 1209            Altered Skin Integrity 02/22/24 0930 Left plantar Heel Ulceration (Active)   02/22/24 0930   Altered Skin Integrity Present on Admission - Did Patient arrive to the hospital with altered skin?:    Side: Left   Orientation: plantar   Location: Heel   Wound Number:    Is this injury device related?:    Primary Wound Type: Ulceration   Description of Altered Skin Integrity:    Ankle-Brachial Index:    Pulses:    Removal Indication and Assessment:    Wound Outcome:    (Retired) Wound Length (cm):    (Retired) Wound Width (cm):    (Retired) Depth (cm):    Wound Description (Comments):    Removal Indications:    Wound Image   12/27/24 1209   Dressing Appearance Intact;Moist drainage 12/27/24 1209   Drainage Amount Small 12/27/24 1209   Drainage Characteristics/Odor Carmen;Malodorous 12/27/24 1209   Appearance  Eschar;Dry;Black;Yellow 12/27/24 1209   Black (%), Wound Tissue Color 100 % 12/27/24 1209   Periwound Area Edematous;Maroon 12/27/24 1209   Wound Edges Defined 12/27/24 1209   Wound Length (cm) 4 cm 12/27/24 1209   Wound Width (cm) 6.7 cm 12/27/24 1209   Wound Surface Area (cm^2) 26.8 cm^2 12/27/24 1209   Care Cleansed with:;Antimicrobial agent;Sterile normal saline 12/27/24 1209   Dressing Applied;Other (comment) 12/27/24 1209   Periwound Care Topical treatment applied 12/27/24 1209   Off Loading Football dressing;Off loading shoe 12/27/24 1209   Dressing Change Due 12/31/24 12/27/24 1209            Wound 07/22/24 Arterial Ulcer Right distal Toe, first (Active)   07/22/24  Toe, first   Present on Original Admission: Y   Primary Wound Type: Arterial ulc   Side: Right   Orientation: distal   Wound Approximate Age at First Assessment (Weeks):    Wound Number:    Is this injury device related?:    Incision Type:    Closure Method:    Wound Description (Comments):    Type:    Additional Comments:    Ankle-Brachial Index:    Pulses:    Removal Indication and Assessment:    Wound Outcome:    Wound Image   12/27/24 1209   Dressing Appearance Open to air 12/27/24 1209   Appearance Yellow;Black;Eschar;Dry 12/27/24 1209   Black (%), Wound Tissue Color 80 % 12/27/24 1209   Yellow (%), Wound Tissue Color 20 % 12/27/24 1209   Periwound Area Dry;Edematous 12/27/24 1209   Wound Edges Irregular 12/27/24 1209   Wound Length (cm) 4.8 cm 12/27/24 1209   Wound Width (cm) 3.4 cm 12/27/24 1209   Wound Surface Area (cm^2) 16.32 cm^2 12/27/24 1209   Care Cleansed with:;Antimicrobial agent;Sterile normal saline 12/27/24 1209   Dressing Other (comment) 12/27/24 1209   Periwound Care Topical treatment applied 12/27/24 1209            Wound 11/25/24 Arterial Ulcer Left Toe, first (Active)   11/25/24  Toe, first   Present on Original Admission:    Primary Wound Type: Arterial ulc   Side: Left   Orientation:    Wound Approximate Age at First  Assessment (Weeks):    Wound Number:    Is this injury device related?:    Incision Type:    Closure Method:    Wound Description (Comments):    Type:    Additional Comments:    Ankle-Brachial Index:    Pulses:    Removal Indication and Assessment:    Wound Outcome:    Wound Image   12/27/24 1209   Dressing Appearance Open to air 12/27/24 1209   Appearance Eschar;Dry;Yellow;Black 12/27/24 1209   Black (%), Wound Tissue Color 90 % 12/27/24 1209   Yellow (%), Wound Tissue Color 10 % 12/27/24 1209   Periwound Area Dry;Edematous 12/27/24 1209   Wound Edges Irregular 12/27/24 1209   Wound Length (cm) 2 cm 12/27/24 1209   Wound Width (cm) 2.9 cm 12/27/24 1209   Wound Surface Area (cm^2) 5.8 cm^2 12/27/24 1209   Care Cleansed with:;Antimicrobial agent;Sterile normal saline 12/27/24 1209   Dressing Other (comment) 12/27/24 1209   Periwound Care Topical treatment applied 12/27/24 1209             Plan:            Education about the prevention of limb loss.    Discussed wound healing cycle, skin integrity, ways to care for skin.Counseled patient on the effects of biomechanical pressure and PAOD on healing. She verbalizes understanding that it can increase the chances of delayed healing and this prolonged exposure leads to infection or progression of infection which subsequently can result in loss of limb.    Patient history and mannerisms consistent with peripheral arterial occlusive disease.  Discussed with patient that although she was consult to me to discuss amputation of the gangrenous digits I will defer surgical care to vascular surgeon.    Stated in clear terms that patient is high risk for BKA to the LLE    Personally reviewed imaging with some suspicion of OM. CT ordered to further evaluate for bone involvement or abscess    ID consult placed.    The wound is cleansed of foreign material as much as possible and the base inspected for bone or abscess.      Follow-up: with her usual wound care provider but should  [facility] immediately if any signs of infection, such as fever, chills, sweats, increased redness or pain.    Short-term goals include maintaining good offloading and minimizing bioburden, promoting granulation and epithelialization to healing.  Long-term goals include keeping the wound healed by good offloading and medical management under the direction of internist.    HH will follow up as ordered. AVS printed and  patient was given print out of all future scheduled appointments.     Tissue pathology and/or culture taken     [] Yes      [x] No  Sharp debridement performed                   [] Yes       [x] No  Labs ordered     [] Yes       [x] No  Imaging ordered    [x] Yes      [] No    Orders Placed This Encounter   Procedures    CT Foot Without Contrast Bilateral     Standing Status:   Future     Standing Expiration Date:   12/27/2025     Order Specific Question:   May the Radiologist modify the order per protocol to meet the clinical needs of the patient?     Answer:   Yes    Ambulatory referral/consult to Infectious Disease     Standing Status:   Future     Standing Expiration Date:   1/27/2026     Referral Priority:   Routine     Referral Type:   Consultation     Referral Reason:   Specialty Services Required     Requested Specialty:   Infectious Diseases     Number of Visits Requested:   1        No follow-ups on file.

## 2025-01-07 ENCOUNTER — DOCUMENT SCAN (OUTPATIENT)
Dept: HOME HEALTH SERVICES | Facility: HOSPITAL | Age: 60
End: 2025-01-07
Payer: COMMERCIAL

## 2025-01-08 ENCOUNTER — DOCUMENT SCAN (OUTPATIENT)
Dept: HOME HEALTH SERVICES | Facility: HOSPITAL | Age: 60
End: 2025-01-08
Payer: COMMERCIAL

## 2025-01-08 ENCOUNTER — EXTERNAL HOME HEALTH (OUTPATIENT)
Dept: HOME HEALTH SERVICES | Facility: HOSPITAL | Age: 60
End: 2025-01-08
Payer: COMMERCIAL

## 2025-01-09 ENCOUNTER — TELEPHONE (OUTPATIENT)
Dept: VASCULAR SURGERY | Facility: CLINIC | Age: 60
End: 2025-01-09
Payer: COMMERCIAL

## 2025-01-09 NOTE — TELEPHONE ENCOUNTER
----- Message from [place] sent at 1/9/2025  1:23 PM CST -----  Regarding: Otis ()  Who called:Otis    What is the request in detail: Patient  wants someone to call him in regards to his wife appt on 1/15/2025 want to know the details of this appt     Can the clinic reply by [facility]? No    Would the patient rather a call back or a response via [facility]? Callback    Best call back number: [phone]  Otis call after 3:30pm    Additional Information:

## 2025-01-09 NOTE — TELEPHONE ENCOUNTER
Called pt.  and no answer. Left message returning his call and to call our office. Pt. Scheduled for LLE angio.on 1/15/2025.

## 2025-01-10 ENCOUNTER — HOSPITAL ENCOUNTER (OUTPATIENT)
Dept: PREADMISSION TESTING | Facility: HOSPITAL | Age: 60
Discharge: HOME | End: 2025-01-10
Attending: SURGERY
Payer: MEDICARE

## 2025-01-10 VITALS
SYSTOLIC BLOOD PRESSURE: 132 MMHG | WEIGHT: 108 LBS | RESPIRATION RATE: 18 BRPM | BODY MASS INDEX: 18.44 KG/M2 | TEMPERATURE: 96 F | HEART RATE: 61 BPM | HEIGHT: 64 IN | OXYGEN SATURATION: 99 % | DIASTOLIC BLOOD PRESSURE: 77 MMHG

## 2025-01-10 DIAGNOSIS — Z01.818 PREOP TESTING: Primary | ICD-10-CM

## 2025-01-10 LAB
ANION GAP SERPL CALC-SCNC: 16 MMOL/L (ref 8–16)
BASOPHILS # BLD AUTO: 0.09 K/UL (ref 0–0.2)
BASOPHILS NFR BLD: 1.8 % (ref 0–1.9)
BUN SERPL-MCNC: 32 MG/DL (ref 6–20)
CALCIUM SERPL-MCNC: 9.7 MG/DL (ref 8.7–10.5)
CHLORIDE SERPL-SCNC: 99 MMOL/L (ref 95–110)
CO2 SERPL-SCNC: 25 MMOL/L (ref 23–29)
CREAT SERPL-MCNC: 5.6 MG/DL (ref 0.5–1.4)
DIFFERENTIAL METHOD BLD: ABNORMAL
EOSINOPHIL # BLD AUTO: 0.3 K/UL (ref 0–0.5)
EOSINOPHIL NFR BLD: 6.2 % (ref 0–8)
ERYTHROCYTE [DISTWIDTH] IN BLOOD BY AUTOMATED COUNT: 18.3 % (ref 11.5–14.5)
EST. GFR  (NO RACE VARIABLE): 8 ML/MIN/1.73 M^2
GLUCOSE SERPL-MCNC: 66 MG/DL (ref 70–110)
HCT VFR BLD AUTO: 40.3 % (ref 37–48.5)
HGB BLD-MCNC: 12.2 G/DL (ref 12–16)
IMM GRANULOCYTES # BLD AUTO: 0.03 K/UL (ref 0–0.04)
IMM GRANULOCYTES NFR BLD AUTO: 0.6 % (ref 0–0.5)
LYMPHOCYTES # BLD AUTO: 1.6 K/UL (ref 1–4.8)
LYMPHOCYTES NFR BLD: 32.5 % (ref 18–48)
MCH RBC QN AUTO: 27.7 PG (ref 27–31)
MCHC RBC AUTO-ENTMCNC: 30.3 G/DL (ref 32–36)
MCV RBC AUTO: 92 FL (ref 82–98)
MONOCYTES # BLD AUTO: 0.6 K/UL (ref 0.3–1)
MONOCYTES NFR BLD: 11.9 % (ref 4–15)
NEUTROPHILS # BLD AUTO: 2.4 K/UL (ref 1.8–7.7)
NEUTROPHILS NFR BLD: 47 % (ref 38–73)
NRBC BLD-RTO: 0 /100 WBC
PLATELET # BLD AUTO: 202 K/UL (ref 150–450)
PMV BLD AUTO: 9.5 FL (ref 9.2–12.9)
POTASSIUM SERPL-SCNC: 5 MMOL/L (ref 3.5–5.1)
RBC # BLD AUTO: 4.4 M/UL (ref 4–5.4)
SODIUM SERPL-SCNC: 140 MMOL/L (ref 136–145)
WBC # BLD AUTO: 5.04 K/UL (ref 3.9–12.7)

## 2025-01-10 PROCEDURE — 80048 BASIC METABOLIC PNL TOTAL CA: CPT | Performed by: SURGERY

## 2025-01-10 PROCEDURE — 85025 COMPLETE CBC W/AUTO DIFF WBC: CPT | Performed by: SURGERY

## 2025-01-10 NOTE — DISCHARGE INSTRUCTIONS
YOUR PROCEDURE WILL BE AT [facility] at [address] [place] [place] [address]           Enter through the Main Entrance facing [address].       Report to the Same Day Surgery Registration Desk in the hallway.(Just beside the Same Day Surgery Unit)      Your procedure  is scheduled for __1/15/2025________.   Arrive in Same Day Surgery at ____6am________    You may have two visitors.  No children under 12 years old.   You will be going to the Same Day Surgery Unit on the 2nd floor of the hospital.    Important instructions:  Do not eat or drink anything after midnight.         SEE MEDICATION SHEET.   TAKE MEDICATIONS AS DIRECTED WITH SIPS OF WATER.    Do not take any diabetic medication on the morning of surgery unless instructed to do so by your doctor or pre op nurse.      STOP taking for 7 days before surgery:    Aspirin    Ibuprofen  (Advil, Motrin)  Mobic (meloxicam, celebrex)  Aleve (naproxen),   Fish oil, Krill oil and Vitamin E  Headache Powders (BC Powder, Goody's Powder)             You may take Tylenol if needed which is not a blood thinner.    Please shower the night before and the morning of your surgery.        Use Chlorhexidine soap as instructed by your pre op nurse.   Please place clean linens on your bed the night before surgery. Please wear fresh clean clothing after each shower.    No shaving of procedural area at least 4-5 days before surgery due to increased risk of skin irritation and/or possible infection.    Female patients may be asked for a urine specimen on the morning of the surgery.  Please check with your nurse before using the restroom.  Contact lenses and removable denture work may not be worn during your procedure.    You may wear deodorant only. If you are having breast surgery, do not wear deodorant on the operative side.    Do not wear powder, body lotion, perfume/cologne or make-up.    Do not wear any jewelry or have any metal on your  body.    You will be asked to remove any dentures or partials for the procedure.    If you are going home on the same day of surgery, you must arrange for a family member or a friend to drive you home.  Public transportation is prohibited.  You will not be able to drive home if you were given anesthesia or sedation.    Patients who want to have their Post-op prescriptions filled from our in-house [facility], bring a Credit/Debit Card  or cash with you. A co-pay may be required.  The pharmacy closes at 5:30 pm.    Children under 18 years of age require a parent/guardian present the entire time that they are here.    Wear loose fitting clothes allowing for bandages.    Please leave money and valuables home.      You may bring your cell phone.    Call the doctor if fever or illness should occur before your surgery.    Call [phone] to contact us here if needed.

## 2025-01-13 ENCOUNTER — TELEPHONE (OUTPATIENT)
Dept: VASCULAR SURGERY | Facility: CLINIC | Age: 60
End: 2025-01-13
Payer: COMMERCIAL

## 2025-01-13 NOTE — TELEPHONE ENCOUNTER
Called and spoke with pt. She return a missed call to our office. Aware her  called prior to her pre-op appt. for LLE angio.procedure with  on 1/15/2025 at [facility] for 8 am. Arrival time 6 am to [facility] second floor. Pt.will need to enter hospital through the ER. She will need someone to drive her home. Pt.npo past midnight the night prior. Aware someone from the dept.will call the afternoon prior to confirm times. Pt. had pre-op on Friday 1/10/2025 with blood work done. Pt.verbalized understanding and will call our office if has any questions prior to procedure.

## 2025-01-15 ENCOUNTER — HOSPITAL ENCOUNTER (OUTPATIENT)
Dept: INTERVENTIONAL RADIOLOGY/VASCULAR | Facility: HOSPITAL | Age: 60
Discharge: HOME | End: 2025-01-15
Attending: SURGERY
Payer: MEDICARE

## 2025-01-15 VITALS
RESPIRATION RATE: 18 BRPM | OXYGEN SATURATION: 96 % | HEART RATE: 94 BPM | TEMPERATURE: 98 F | DIASTOLIC BLOOD PRESSURE: 60 MMHG | SYSTOLIC BLOOD PRESSURE: 107 MMHG

## 2025-01-15 DIAGNOSIS — Z99.2 ESRD (END STAGE RENAL DISEASE) ON DIALYSIS: ICD-10-CM

## 2025-01-15 DIAGNOSIS — N18.6 ESRD (END STAGE RENAL DISEASE) ON DIALYSIS: ICD-10-CM

## 2025-01-15 DIAGNOSIS — L97.529 ISCHEMIC TOE ULCER, LEFT, WITH UNSPECIFIED SEVERITY: ICD-10-CM

## 2025-01-15 RX ORDER — CEFAZOLIN 2 G/1
2 INJECTION, POWDER, FOR SOLUTION INTRAMUSCULAR; INTRAVENOUS ONCE
Status: DISCONTINUED | OUTPATIENT
Start: 2025-01-15 | End: 2025-01-16 | Stop reason: HOSPADM

## 2025-01-15 NOTE — H&P
Interval History and Physical    The patient has been examined and the H&P has been reviewed:     I concur with the findings and no changes have occurred since H&P was written.    - Plan for LLE angiogram with Dr. Casey today  - Known LLE wound at toes and heel     Procedure risks, benefits and alternative options discussed and understood by patient/family.    Mallampati Score: II   ASA Score: III    Brianna Nuñez PA-C  Vascular & Endovascular Surgery  [facility]      I have reviewed the notes, assessments, and/or procedures performed by Brianna CARRENO, and the following update was noted.  Patient took Eliquis 1/15/25 AM.  Risks outweigh benefits for angiogram.  Will reschedule as soon as safely possible.    Corby Casey M.D., R.P.V.I.  [facility]

## 2025-01-15 NOTE — OR NURSING
Client did not hold Eliquis, she was told to take her Rx my the Pre-op RN.     Eliquis hold for 24 hours minimum per Dr. Casey for an angiogram; Dr. Casey is at the bedside with the client and her .     Client will be rescheduled.     Pre-op team notified of over site.

## 2025-01-16 NOTE — PROCEDURES
Patient took Eliquis 1/15/25 AM. Risks outweigh benefits for angiogram. Will reschedule as soon as safely possible.

## 2025-01-21 ENCOUNTER — HOSPITAL ENCOUNTER (OUTPATIENT)
Dept: PREADMISSION TESTING | Facility: HOSPITAL | Age: 60
Discharge: HOME | End: 2025-01-21
Payer: COMMERCIAL

## 2025-01-24 ENCOUNTER — TELEPHONE (OUTPATIENT)
Dept: PREADMISSION TESTING | Facility: HOSPITAL | Age: 60
End: 2025-01-24
Payer: COMMERCIAL

## 2025-01-24 ENCOUNTER — DOCUMENTATION ONLY (OUTPATIENT)
Dept: CARDIOLOGY | Facility: CLINIC | Age: 60
End: 2025-01-24
Payer: COMMERCIAL

## 2025-01-24 NOTE — PROGRESS NOTES
Clearance requested for vascular angio. Hx NICM with deactivated AICD. Baseline EF 25%. Will clear at moderate cardiac risk. Ok to hold eliquis 3 days before

## 2025-01-27 ENCOUNTER — HOSPITAL ENCOUNTER (OUTPATIENT)
Dept: PREADMISSION TESTING | Facility: HOSPITAL | Age: 60
Discharge: HOME | End: 2025-01-27
Attending: SURGERY
Payer: MEDICARE

## 2025-01-27 ENCOUNTER — ANESTHESIA (OUTPATIENT)
Dept: ANESTHESIOLOGY | Facility: HOSPITAL | Age: 60
End: 2025-01-27

## 2025-01-27 ENCOUNTER — ANESTHESIA EVENT (OUTPATIENT)
Dept: ANESTHESIOLOGY | Facility: HOSPITAL | Age: 60
End: 2025-01-27

## 2025-01-27 ENCOUNTER — OFFICE VISIT (OUTPATIENT)
Dept: OPTOMETRY | Facility: CLINIC | Age: 60
End: 2025-01-27
Payer: MEDICARE

## 2025-01-27 VITALS
HEART RATE: 69 BPM | BODY MASS INDEX: 18.95 KG/M2 | TEMPERATURE: 98 F | DIASTOLIC BLOOD PRESSURE: 62 MMHG | HEIGHT: 64 IN | WEIGHT: 111 LBS | SYSTOLIC BLOOD PRESSURE: 106 MMHG | RESPIRATION RATE: 18 BRPM | OXYGEN SATURATION: 98 %

## 2025-01-27 DIAGNOSIS — H52.4 PRESBYOPIA: ICD-10-CM

## 2025-01-27 DIAGNOSIS — I10 PRIMARY HYPERTENSION: ICD-10-CM

## 2025-01-27 DIAGNOSIS — H25.13 NUCLEAR SCLEROSIS OF BOTH EYES: Primary | ICD-10-CM

## 2025-01-27 PROCEDURE — 99213 OFFICE O/P EST LOW 20 MIN: CPT | Mod: PBBFAC,PO | Performed by: OPTOMETRIST

## 2025-01-27 PROCEDURE — 99999 PR PBB SHADOW E&M-EST. PATIENT-LVL III: CPT | Mod: PBBFAC,,, | Performed by: OPTOMETRIST

## 2025-01-27 PROCEDURE — 92014 COMPRE OPH EXAM EST PT 1/>: CPT | Mod: GW,S$PBB,, | Performed by: OPTOMETRIST

## 2025-01-27 NOTE — ANESTHESIA PREPROCEDURE EVALUATION
01/27/2025  Eva Bloom is a 59 y.o., female scheduled for Angiogram on 1/29/2025.    [facility] AICD  Anesthesia Recommendations: None                Past Medical History:   Diagnosis Date    Acute metabolic encephalopathy 01/02/2024    Anemia     Anticoagulant long-term use     Atrial fibrillation     Bronchitis 03/01/2017    Cancer 2016    kidney cancer    CHF (congestive heart failure), NYHA class II, chronic, systolic     CMV (cytomegalovirus) antibody positive     CVA (cerebral vascular accident) 01/03/2024    Encounter for blood transfusion     ESRD (end stage renal disease)     Essential hypertension 09/23/2015    H/O herpes simplex type 2 infection     Herpes simplex type 1 antibody positive     History of kidney cancer     s/p left nephrectomy 1/2016    Hyperparathyroidism, unspecified     Hyperuricemia without signs of inflammatory arthritis and tophaceous disease     Kidney stones     LGSIL (low grade squamous intraepithelial dysplasia)     Myocardiopathy 07/21/2017    Prediabetes     Proteinuria     Renal disorder     Thyroid nodule     Urate nephropathy      Past Surgical History:   Procedure Laterality Date    ANGIOGRAPHY OF LOWER EXTREMITY Right 11/11/2024    Procedure: Angiogram Extremity Unilateral;  Surgeon: Corby Casey MD;  Location: [address];  Service: Vascular;  Laterality: Right;  mGy 63.61      Gycm2   11.8042        Fluro time  13.5min      Contrast amount 40ml    ANGIOPLASTY, PERIPHERAL BLOOD VESSEL Right 11/11/2024    Procedure: ANGIOPLASTY, PERIPHERAL BLOOD VESSEL;  Surgeon: Corby Casey MD;  Location: [address];  Service: Vascular;  Laterality: Right;    BREAST CYST EXCISION      COLONOSCOPY N/A 11/12/2015    COLONOSCOPY N/A 03/12/2021    Procedure: COLONOSCOPY;  Surgeon: Brendon Lanier MD;  Location: [facility];  Service: Endoscopy;   Laterality: N/A;  covid test 3/9, labs prior, prep instr mailed -ml    EXCISION OF ARTERIOVENOUS FISTULA Left 09/27/2023    Procedure: EXCISION, AV FISTULA;  Surgeon: FARIDEH Muñoz III, MD;  Location: [facility] OR 2ND FLR;  Service: Vascular;  Laterality: Left;  LUE AV graft excision    INSERTION OF BIVENTRICULAR IMPLANTABLE CARDIOVERTER-DEFIBRILLATOR (ICD)  04/2021    INSERTION OF TUNNELED CENTRAL VENOUS CATHETER (CVC) WITH SUBCUTANEOUS PORT N/A 01/25/2023    Procedure: INSERTION, DUAL LUMEN CATHETER WITH PORT, WITH IMAGING GUIDANCE;  Surgeon: FARIDEH Muñoz III, MD;  Location: [facility] OR 2ND FLR;  Service: Peripheral Vascular;  Laterality: N/A;  possinble permcath placment    NEPHRECTOMY-LAPAROSCOPIC Left 01/12/2016    PERCUTANEOUS TRANSLUMINAL ANGIOPLASTY OF ARTERIOVENOUS FISTULA Left 04/19/2023    Procedure: PTA, AV FISTULA;  Surgeon: FARIDEH Muñoz III, MD;  Location: [facility] CATH LAB;  Service: Peripheral Vascular;  Laterality: Left;    PERITONEAL CATHETER INSERTION      Permacath insertion  01/12/2017    PLACEMENT OF ARTERIOVENOUS GRAFT  12/28/2022    Procedure: INSERTION, GRAFT, ARTERIOVENOUS;  Surgeon: FARIDEH Muñoz III, MD;  Location: [facility] OR [facility] FLR;  Service: Peripheral Vascular;;    REMOVAL, GRAFT, ARTERIOVENOUS, UPPER EXTREMITY Left 01/25/2023    Procedure: REMOVAL, GRAFT, ARTERIOVENOUS, UPPER EXTREMITY;  Surgeon: FARIDEH Muñoz III, MD;  Location: [facility] OR 2ND FLR;  Service: Peripheral Vascular;  Laterality: Left;    REVISION OF ARTERIOVENOUS FISTULA Left 05/01/2023    Procedure: REVISION, AV FISTULA;  Surgeon: FARIDEH Muñoz III, MD;  Location: [facility] OR [facility] FLR;  Service: Peripheral Vascular;  Laterality: Left;  LUE AVG revision    REVISION OF PROCEDURE INVOLVING ARTERIOVENOUS GRAFT Left 12/28/2022    Procedure: REVISION, PROCEDURE INVOLVING ARTERIOVENOUS GRAFT;  Surgeon: FARIDEH Muñoz III, MD;  [facility];  Service: Peripheral Vascular;  Laterality: Left;    REVISION OF PROCEDURE  INVOLVING ARTERIOVENOUS GRAFT Left 07/21/2023    Procedure: LEFT ARM ARTERIOVENOUS GRAFT EXCISION  AND LIGATION;  Surgeon: Obi Werner MD;  Location: [facility] OR;  Service: Vascular;  Laterality: Left;  Left AVG excision and revision with interposition    SALPINGOOPHORECTOMY Right 2016    KJB---DAVINCI    TONSILLECTOMY      TUBAL LIGATION             Pre-op Assessment    I have reviewed the Patient Summary Reports.     I have reviewed the Nursing Notes. I have reviewed the NPO Status.   I have reviewed the Medications.     Review of Systems  Anesthesia Hx:  No problems with previous Anesthesia             Denies Family Hx of Anesthesia complications.    Denies Personal Hx of Anesthesia complications.                    Social:  Non-Smoker, No Alcohol Use       Hematology/Oncology:  Hematology Normal                                 Oncology Comments: Kidney      EENT/Dental:  EENT/Dental Normal           Cardiovascular:    Pacemaker Hypertension Valvular problems/Murmurs, AS   Dysrhythmias atrial fibrillation  CHF        ECHO 7/5/24:·  Left Ventricle: The left ventricle is normal in size. Moderately increased wall thickness. There is moderate concentric hypertrophy. Severe global hypokinesis present. There is severely reduced systolic function with a visually estimated ejection fraction of 20 - 25%.  ·  Right Ventricle: Moderate right ventricular enlargement. Systolic function is moderately reduced.  ·  Aortic Valve: The aortic valve is a trileaflet valve. Mildly restricted motion. There is moderate stenosis. Aortic valve area by VTI is 0.86 cm². Aortic valve peak velocity is 2.34 m/s. Mean gradient is 12 mmHg. The dimensionless index is 0.31. Cannot exclude pseudostenosis due to low EF.  ·  Mitral Valve: There is moderate bileaflet sclerosis. There is mild stenosis. The mean pressure gradient across the mitral valve is 3 mmHg at a heart rate of 80 bpm. There is moderate regurgitation with an eccentric  "jet.  ·  Tricuspid Valve: There is severe regurgitation with an eccentrically directed jet.  ·  Pulmonic Valve: There is moderate regurgitation.  ·  Pulmonary Artery: The estimated pulmonary artery systolic pressure is 64 mmHg.  ·  Pericardium: There is a moderate posterior effusion. No indication of cardiac tamponade.                             Pulmonary:      Shortness of breath   Breathing at baseline               Renal/:  Chronic Renal Disease, ESRD   TTHS  S/p left nephrectomy 2016             Hepatic/GI:  Hepatic/GI Normal                    Musculoskeletal:  Musculoskeletal Normal                Neurological:   CVA    Denies Seizures.                                Endocrine:   Hypothyroidism          Dermatological:  Skin Normal    Psych:  Psychiatric Normal                       Anesthesia Plan  Type of Anesthesia, risks & benefits discussed:    ASA Score: 4  Anesthesia Plan Notes: PMHx significant for HTN, cardiomyopathy, A-fib, Nonrhehmatic mitral valve regurgitation, ICD, CHF  "Clearance requested for vascular angio. Hx NICM with deactivated AICD. Baseline EF 25%. Will clear at moderate cardiac risk. Ok to hold eliquis 3 days before."      .      "

## 2025-01-27 NOTE — DISCHARGE INSTRUCTIONS
ANGIOGRAM IN RADIOLOGY             YOUR PROCEDURE WILL BE AT [facility] at [address], [place]. [id number]                                                       Enter through the Main Entrance facing [address].                   Report to the Same Day Surgery Registration Desk in the hallway.(Just beside the Same Day Surgery Unit)  ANGIOGRAM IN RADIOLOGY      Before 7 AM, enter through the Emergency Entrance..   After 7 AM enter through the Main Entrance.    Your procedure  is scheduled for ____1/29/2025_____________.Arrive at __930 am__________.      You may have two visitors.  No children under 12 years old.       You will be going to the Same Day Surgery Unit on the 2nd floor of the hospital.    Important instructions:  Do not eat anything after midnight.  You may have water to take your medications.    Please shower the night before and the morning of your surgery.      Use Chlorhexidine soap as instructed by your pre op nurse.   Please place clean linens on your bed the night before surgery. Please wear fresh clean clothing after each shower.    No shaving of procedural area at least 4-5 days before surgery due to increased risk of skin irritation and/or possible infection.    Contact lenses and removable denture work may not be worn during your procedure.    You may wear deodorant only.     Do not wear powder, body lotion, perfume/cologne or make-up.    Do not wear any jewelry or have any metal on your body.    You will be asked to remove any dentures or partials for the procedure.    If you are going home on the same day of surgery, you must arrange for a family member or a friend to drive you home.  Public transportation is prohibited.  You will not be able to drive home if you were given anesthesia or sedation.    Please leave money and valuables home.      You may bring your cell phone.    Call the doctor if fever or illness should occur before your surgery.    Call  [phone] to contact us here if needed.

## 2025-01-27 NOTE — PROGRESS NOTES
Subjective:       Patient ID: Eva Bloom is a 59 y.o. female      Chief Complaint   Patient presents with    Concerns About Ocular Health    Hypertensive Eye Exam     History of Present Illness  Dls: 5/5/23 Dr. Larsen     60 y/o female presents today for hypertensive eye exam.  Pt states no changes in vision. Pt wears single vision glasses for   reading.     No tearing  No itching  No burning  No pain  No ha's  No floaters  No flashes    Eye meds  None    Pohx:   None    Fohx:   Cat- aunt       Assessment/Plan:     1. Nuclear sclerosis of both eyes (Primary)  Educated pt on presence of cataracts and effects on vision. No surgery at this time. Recheck in one year, sooner PRN.    2. Primary hypertension  No hypertensive retinopathy. Continue BP control. RTC 1 year for DFE.    3. Presbyopia  Readers PRN  Declined MRx.     Follow up in about 1 year (around 1/27/2026).

## 2025-01-28 ENCOUNTER — TELEPHONE (OUTPATIENT)
Dept: INTERVENTIONAL RADIOLOGY/VASCULAR | Facility: HOSPITAL | Age: 60
End: 2025-01-28
Payer: COMMERCIAL

## 2025-01-29 ENCOUNTER — HOSPITAL ENCOUNTER (OUTPATIENT)
Dept: INTERVENTIONAL RADIOLOGY/VASCULAR | Facility: HOSPITAL | Age: 60
Discharge: HOME | End: 2025-01-29
Attending: SURGERY
Payer: MEDICARE

## 2025-01-29 ENCOUNTER — ANESTHESIA EVENT (OUTPATIENT)
Dept: INTERVENTIONAL RADIOLOGY/VASCULAR | Facility: HOSPITAL | Age: 60
End: 2025-01-29
Payer: MEDICARE

## 2025-01-29 VITALS
TEMPERATURE: 97 F | SYSTOLIC BLOOD PRESSURE: 141 MMHG | OXYGEN SATURATION: 92 % | RESPIRATION RATE: 18 BRPM | DIASTOLIC BLOOD PRESSURE: 76 MMHG | HEART RATE: 74 BPM

## 2025-01-29 DIAGNOSIS — N18.6 ESRD (END STAGE RENAL DISEASE) ON DIALYSIS: ICD-10-CM

## 2025-01-29 DIAGNOSIS — L97.529 ISCHEMIC TOE ULCER, LEFT, WITH UNSPECIFIED SEVERITY: ICD-10-CM

## 2025-01-29 DIAGNOSIS — I70.262 CRITICAL LIMB ISCHEMIA OF LEFT LOWER EXTREMITY WITH GANGRENE: Primary | ICD-10-CM

## 2025-01-29 DIAGNOSIS — Z99.2 ESRD (END STAGE RENAL DISEASE) ON DIALYSIS: ICD-10-CM

## 2025-01-29 LAB
ANION GAP SERPL CALC-SCNC: 18 MMOL/L (ref 8–16)
BUN SERPL-MCNC: 46 MG/DL (ref 6–20)
CALCIUM SERPL-MCNC: 8.9 MG/DL (ref 8.7–10.5)
CHLORIDE SERPL-SCNC: 96 MMOL/L (ref 95–110)
CO2 SERPL-SCNC: 27 MMOL/L (ref 23–29)
CREAT SERPL-MCNC: 5.8 MG/DL (ref 0.5–1.4)
EST. GFR  (NO RACE VARIABLE): 8 ML/MIN/1.73 M^2
GLUCOSE SERPL-MCNC: 68 MG/DL (ref 70–110)
POTASSIUM SERPL-SCNC: 4.7 MMOL/L (ref 3.5–5.1)
POTASSIUM SERPL-SCNC: 4.7 MMOL/L (ref 3.5–5.1)
SODIUM SERPL-SCNC: 141 MMOL/L (ref 136–145)

## 2025-01-29 PROCEDURE — D9220A PRA ANESTHESIA: Mod: ANES,,, | Performed by: ANESTHESIOLOGY

## 2025-01-29 PROCEDURE — C1769 GUIDE WIRE: HCPCS

## 2025-01-29 PROCEDURE — 80048 BASIC METABOLIC PNL TOTAL CA: CPT | Performed by: ANESTHESIOLOGY

## 2025-01-29 PROCEDURE — C1760 CLOSURE DEV, VASC: HCPCS

## 2025-01-29 PROCEDURE — 37000008 HC ANESTHESIA 1ST 15 MINUTES

## 2025-01-29 PROCEDURE — 36415 COLL VENOUS BLD VENIPUNCTURE: CPT | Performed by: ANESTHESIOLOGY

## 2025-01-29 PROCEDURE — 25500020 PHARM REV CODE 255: Performed by: SURGERY

## 2025-01-29 PROCEDURE — C1725 CATH, TRANSLUMIN NON-LASER: HCPCS

## 2025-01-29 PROCEDURE — 63600175 PHARM REV CODE 636 W HCPCS: Performed by: NURSE ANESTHETIST, CERTIFIED REGISTERED

## 2025-01-29 PROCEDURE — 25000003 PHARM REV CODE 250

## 2025-01-29 PROCEDURE — C1894 INTRO/SHEATH, NON-LASER: HCPCS

## 2025-01-29 PROCEDURE — 37000009 HC ANESTHESIA EA ADD 15 MINS

## 2025-01-29 PROCEDURE — D9220A PRA ANESTHESIA: Mod: CRNA,,, | Performed by: NURSE ANESTHETIST, CERTIFIED REGISTERED

## 2025-01-29 PROCEDURE — 27201423 OPTIME MED/SURG SUP & DEVICES STERILE SUPPLY

## 2025-01-29 PROCEDURE — 25000003 PHARM REV CODE 250: Performed by: SURGERY

## 2025-01-29 PROCEDURE — 63600175 PHARM REV CODE 636 W HCPCS: Performed by: SURGERY

## 2025-01-29 RX ORDER — HEPARIN SODIUM 1000 [USP'U]/ML
INJECTION, SOLUTION INTRAVENOUS; SUBCUTANEOUS
Status: DISCONTINUED | OUTPATIENT
Start: 2025-01-29 | End: 2025-01-29

## 2025-01-29 RX ORDER — PROPOFOL 10 MG/ML
VIAL (ML) INTRAVENOUS CONTINUOUS PRN
Status: DISCONTINUED | OUTPATIENT
Start: 2025-01-29 | End: 2025-01-29

## 2025-01-29 RX ORDER — FENTANYL CITRATE 50 UG/ML
INJECTION, SOLUTION INTRAMUSCULAR; INTRAVENOUS
Status: DISCONTINUED | OUTPATIENT
Start: 2025-01-29 | End: 2025-01-29

## 2025-01-29 RX ORDER — MIDAZOLAM HYDROCHLORIDE 1 MG/ML
INJECTION INTRAMUSCULAR; INTRAVENOUS
Status: DISCONTINUED | OUTPATIENT
Start: 2025-01-29 | End: 2025-01-29

## 2025-01-29 RX ORDER — LIDOCAINE HYDROCHLORIDE 10 MG/ML
INJECTION, SOLUTION INFILTRATION; PERINEURAL
Status: COMPLETED | OUTPATIENT
Start: 2025-01-29 | End: 2025-01-29

## 2025-01-29 RX ORDER — PROTAMINE SULFATE 10 MG/ML
INJECTION, SOLUTION INTRAVENOUS
Status: DISCONTINUED | OUTPATIENT
Start: 2025-01-29 | End: 2025-01-29

## 2025-01-29 RX ORDER — PHENYLEPHRINE HYDROCHLORIDE 10 MG/ML
INJECTION INTRAVENOUS
Status: DISCONTINUED | OUTPATIENT
Start: 2025-01-29 | End: 2025-01-29

## 2025-01-29 RX ORDER — ACETAMINOPHEN 500 MG
500 TABLET ORAL EVERY 6 HOURS PRN
Status: DISCONTINUED | OUTPATIENT
Start: 2025-01-29 | End: 2025-01-30 | Stop reason: HOSPADM

## 2025-01-29 RX ORDER — CEFAZOLIN 2 G/1
2 INJECTION, POWDER, FOR SOLUTION INTRAMUSCULAR; INTRAVENOUS ONCE
Status: COMPLETED | OUTPATIENT
Start: 2025-01-29 | End: 2025-01-29

## 2025-01-29 RX ORDER — LIDOCAINE HYDROCHLORIDE 20 MG/ML
INJECTION INTRAVENOUS
Status: DISCONTINUED | OUTPATIENT
Start: 2025-01-29 | End: 2025-01-29

## 2025-01-29 RX ADMIN — LIDOCAINE HYDROCHLORIDE 50 MG: 20 INJECTION, SOLUTION INTRAVENOUS at 12:01

## 2025-01-29 RX ADMIN — PROTAMINE SULFATE 30 MG: 10 INJECTION, SOLUTION INTRAVENOUS at 02:01

## 2025-01-29 RX ADMIN — PROPOFOL 50 MCG/KG/MIN: 10 INJECTION, EMULSION INTRAVENOUS at 12:01

## 2025-01-29 RX ADMIN — SODIUM CHLORIDE: 0.9 INJECTION, SOLUTION INTRAVENOUS at 12:01

## 2025-01-29 RX ADMIN — HEPARIN SODIUM 5000 UNITS: 1000 INJECTION, SOLUTION INTRAVENOUS; SUBCUTANEOUS at 01:01

## 2025-01-29 RX ADMIN — HEPARIN SODIUM 1000 UNITS: 1000 INJECTION, SOLUTION INTRAVENOUS; SUBCUTANEOUS at 01:01

## 2025-01-29 RX ADMIN — MIDAZOLAM HYDROCHLORIDE 2 MG: 1 INJECTION INTRAMUSCULAR; INTRAVENOUS at 12:01

## 2025-01-29 RX ADMIN — HEPARIN SODIUM 2000 UNITS: 1000 INJECTION, SOLUTION INTRAVENOUS; SUBCUTANEOUS at 01:01

## 2025-01-29 RX ADMIN — PHENYLEPHRINE HYDROCHLORIDE 100 MCG: 10 INJECTION INTRAVENOUS at 01:01

## 2025-01-29 RX ADMIN — FENTANYL CITRATE 100 MCG: 50 INJECTION, SOLUTION INTRAMUSCULAR; INTRAVENOUS at 12:01

## 2025-01-29 RX ADMIN — IOHEXOL 60 ML: 350 INJECTION, SOLUTION INTRAVENOUS at 02:01

## 2025-01-29 RX ADMIN — ACETAMINOPHEN 500 MG: 500 TABLET, FILM COATED ORAL at 03:01

## 2025-01-29 RX ADMIN — CEFAZOLIN 2 G: 2 INJECTION, POWDER, FOR SOLUTION INTRAMUSCULAR; INTRAVENOUS at 12:01

## 2025-01-29 RX ADMIN — LIDOCAINE HYDROCHLORIDE 5 ML: 10 INJECTION, SOLUTION INFILTRATION; PERINEURAL at 01:01

## 2025-01-29 RX ADMIN — PROTAMINE SULFATE 5 MG: 10 INJECTION, SOLUTION INTRAVENOUS at 02:01

## 2025-01-29 NOTE — BRIEF OP NOTE
[facility] [place] - Interventional Radiology  Brief Operative Note    Surgery Date: 1/29/25    Surgeon: Corby Casey MD    Assisting: Brianna Nuñez PA-C    Pre-op Diagnosis:  Atherosclerosis left lower extremity with ulceration    Post-op Diagnosis:  same    Procedure: L AT PTA with 2x220 Ultraverse    Anesthesia: Moderate sedation    Operative Findings: improved flow    Estimated Blood Loss: <10 cc         Specimens:   Specimen (24h ago, onward)      None              Discharge Note    OUTCOME: Patient tolerated treatment/procedure well without complication and is now ready for discharge.    DISPOSITION: Home or Self Care    FINAL DIAGNOSIS:  <principal problem not specified>    FOLLOWUP: In clinic    DISCHARGE INSTRUCTIONS:    Discharge Procedure Orders   Remove dressing in 48 hours     Activity as tolerated

## 2025-01-29 NOTE — PROGRESS NOTES
Certification of Assistant at Surgery        Surgery Date: 11/18/2024      Participating Surgeons:  Surgeons and Role:     Corby Casey MD - Primary       Brianna Nuñez PA-C - Assisting         Procedures:  Procedure(s) (LRB):  1. US guided R CFA percutaneous access  2. R femoral angiogram  3. Aortogram  4. Moderate sedation  5. LLE angiogram  6. Balloon angioplasty of the left AT with 2x220 Ultraverse balloon  7. 5fr Mynx       Assistant Surgeon's Certification of Necessity:  I understand that section 1842 (b) (6) (d) of the Social Security Act generally prohibits Medicare Part B reasonable charge payment for the services of assistants at surgery in teaching hospitals when qualified residents are available to furnish such services. I certify that the services for which payment is claimed were medically necessary, and that no qualified resident was available to perform the services. I further understand that these services are subject to post-payment review by the Medicare carrier.        Brianna Nuñez PA-C  Vascular & Endovascular Surgery

## 2025-01-29 NOTE — PATIENT INSTRUCTIONS
VASCULAR SURGERY ANGIOGRAM DISCHARGE INSTRUCTIONS  If you have any questions about this form, please call [phone] or send us a message on [employer].    Once you go home, please abide by the following instructions:     DAY OF POST-PROCEDURE:  You should not go home alone the day of the procedure. Because of the sedation you were given for your procedure, we recommend that you have someone stay with you overnight following your procedure.  Do not drive a car or operate machinery for the remainder of the day.  Do not consume any alcoholic beverages for the remainder of the day.  Postpone signing any important papers or making any important decision for the remainder of the day.  Do not take any muscle relaxants, sedatives, hypnotics, or mood-altering medication today unless ordered by your physician who is aware you are taking the medication today.  Do not take a hot bath or shower for at least 12 hours after procedure.     WOUND CARE:  Remove dressing in one day.   You may shower at that time, no soaking the wound (bath, pool, hot tub, etc) for two weeks.  Shower daily and pat your groin puncture site dry.  If bleeding occurs, apply manual pressure, and immediately seek medical attention at the nearest hospital.    MEDICATIONS:  Please refer to discharge medication reconciliation form.  You may take over the counter tylenol (acetaminophen) as needed for pain.  Continue taking the aspirin 81mg daily.  Restart your Eliquis tomorrow    GENERAL ACTIVITY RESTRICTIONS:  Rest in bed or a recliner for the remainder of the day following the procedure.   Walking is good for you. Try to walk frequently and increase walking distance every day.  No heavy lifting of more than 5 pounds, running, or strenuous activity for at least 2 days after the angiogram.   No baths, swimming in pools, lakes, jacuzzi etc. for 2 weeks.  You may return to your usual activity after the two days.    DIET:  Resume your pre-operative home  diet.    FOLLOW UP:  Follow up with Dr. Casey in 4 weeks with NUVIA and arterial US    CALL THE OFFICE [phone] or GO TO THE EMERGENCY ROOM:  Fever over 101F  Signs of infection at the wound (redness, pain, warmth, pus)  Numbness, tingling, or pain in the leg/foot/groin  Extensive bruising at the groin  Firmness or bulge in the groin  Bleeding or discharge from the access site in your groin  Uncontrolled pain   Worsening shortness of breath or chest pain  Sudden severe pain and swelling at your groin puncture site

## 2025-01-29 NOTE — H&P
[facility]  H&P    1/29/2025: Patient here for LLE angiogram with Dr. Casey. LLE wound at toes and heel. Confirmed Eliquis held.     Mallampati: II  ASA: III    HISTORY OF PRESENT ILLNESS: Eva Bloom is a 59 y.o. female end-stage renal disease, with multiple serious medical comorbidities including admission for sepsis 2 months ago, EF 20%, AFib on Eliquis, who is having increasing bleeding after her dialysis runs.  I placed a revision, left AV graft with long interposition Accu Seal 12/28/2022.  She has had uninterrupted use of this graft ever since.    Recent weeks she is had increased bleeding after dialysis sticks    S/p:    1aaa.  Excision, left AV graft for infection 9/27/2023 ([facility]) (07/21/2023, Dr. Werner)  1aa. Reclosure, L AVG conter-incision 5/1/2023  1a. PTA, L AVG 4/19/2023  Excision, excluded L AVG and permacath 1/25/2023  revision, left AV graft with long interposition Accu Seal 12/28/2022  Original left AV graft 2017 with subsequent covered stent placement in venous outflow    09/1/2023:  She now returns after excision of the left AV graft for infection proximally 5 weeks ago.  She is been dialyzing through a PermCath.    09/22/2023:  Now returns with breakdown of prior counter incision for graft excision.  Notably, she has metastatic renal cell carcinoma to the lungs    10/20/2023:  She now returns after excision of the left AV graft remnant.  She is continued to deteriorate with weight loss, now BMI 13, was recently hospitalized for her declining state, has now been put on hospice.  This point she is still dialyzing through a PermCath    2/2024:  referred by Dr. Farooq for L 3rd toe wound.  Pt has resumed Oncologic care after stopping hospice care and desires to proceed with ulcer healing treatment.    4/2024:  +wound care with healing wounds.    7/2024:  stronger, wounds improving, HD via catheter without issues.    9/2024:  walking with walker, HD  going well.  Wound care without signs of infection.    12/2024: s/p R AT PTA 11/11/24.  Cont to have R toe pain, seeing Podiatry for wound care.       Past Medical History:   Diagnosis Date    Acute metabolic encephalopathy 01/02/2024    Anemia     Anticoagulant long-term use     Atrial fibrillation     Bronchitis 03/01/2017    Cancer 2016    kidney cancer    CHF (congestive heart failure), NYHA class II, chronic, systolic     CMV (cytomegalovirus) antibody positive     CVA (cerebral vascular accident) 01/03/2024    Encounter for blood transfusion     ESRD (end stage renal disease)     Essential hypertension 09/23/2015    H/O herpes simplex type 2 infection     Herpes simplex type 1 antibody positive     History of kidney cancer     s/p left nephrectomy 1/2016    Hyperparathyroidism, unspecified     Hyperuricemia without signs of inflammatory arthritis and tophaceous disease     Kidney stones     LGSIL (low grade squamous intraepithelial dysplasia)     Myocardiopathy 07/21/2017    Prediabetes     Proteinuria     Renal disorder     Thyroid nodule     Urate nephropathy        Past Surgical History:   Procedure Laterality Date    ANGIOGRAPHY OF LOWER EXTREMITY Right 11/11/2024    Procedure: Angiogram Extremity Unilateral;  Surgeon: Corby Casey MD;  Location: [facility] OR [address] [place] [address];  Service: Vascular;  Laterality: Right;  mGy 63.61      Gycm2   11.8042        Fluro time  13.5min      Contrast amount 40ml    ANGIOPLASTY, PERIPHERAL BLOOD VESSEL Right 11/11/2024    Procedure: ANGIOPLASTY, PERIPHERAL BLOOD VESSEL;  Surgeon: Corby Casey MD;  Location: [address];  Service: Vascular;  Laterality: Right;    BREAST CYST EXCISION      COLONOSCOPY N/A 11/12/2015    COLONOSCOPY N/A 03/12/2021    Procedure: COLONOSCOPY;  Surgeon: Brendon Lanier MD;  Location: [facility];  Service: Endoscopy;  Laterality: N/A;  covid test 3/9, labs prior, prep instr mailed -ml    EXCISION OF ARTERIOVENOUS FISTULA Left  09/27/2023    Procedure: EXCISION, AV FISTULA;  Surgeon: FARIDEH Muñoz III, MD;  Location: [facility] OR 2ND FLR;  Service: Vascular;  Laterality: Left;  LUE AV graft excision    INSERTION OF BIVENTRICULAR IMPLANTABLE CARDIOVERTER-DEFIBRILLATOR (ICD)  04/2021    INSERTION OF TUNNELED CENTRAL VENOUS CATHETER (CVC) WITH SUBCUTANEOUS PORT N/A 01/25/2023    Procedure: INSERTION, DUAL LUMEN CATHETER WITH PORT, WITH IMAGING GUIDANCE;  Surgeon: FARIDEH Muñoz III, MD;  Location: [facility] OR 2ND FLR;  Service: Peripheral Vascular;  Laterality: N/A;  possinble permcath placment    NEPHRECTOMY-LAPAROSCOPIC Left 01/12/2016    PERCUTANEOUS TRANSLUMINAL ANGIOPLASTY OF ARTERIOVENOUS FISTULA Left 04/19/2023    Procedure: PTA, AV FISTULA;  Surgeon: FARIDEH Muoñz III, MD;  Location: [facility] CATH LAB;  Service: Peripheral Vascular;  Laterality: Left;    PERITONEAL CATHETER INSERTION      Permacath insertion  01/12/2017    PLACEMENT OF ARTERIOVENOUS GRAFT  12/28/2022    Procedure: INSERTION, GRAFT, ARTERIOVENOUS;  Surgeon: FARIDEH Muñoz III, MD;  Location: [facility] OR 2ND FLR;  Service: Peripheral Vascular;;    REMOVAL, GRAFT, ARTERIOVENOUS, UPPER EXTREMITY Left 01/25/2023    Procedure: REMOVAL, GRAFT, ARTERIOVENOUS, UPPER EXTREMITY;  Surgeon: FARIDEH Muñoz III, MD;  Location: [facility] OR 2ND FLR;  Service: Peripheral Vascular;  Laterality: Left;    REVISION OF ARTERIOVENOUS FISTULA Left 05/01/2023    Procedure: REVISION, AV FISTULA;  Surgeon: FARIDEH Muñoz III, MD;  Location: [facility] OR 2ND FLR;  Service: Peripheral Vascular;  Laterality: Left;  LUE AVG revision    REVISION OF PROCEDURE INVOLVING ARTERIOVENOUS GRAFT Left 12/28/2022    Procedure: REVISION, PROCEDURE INVOLVING ARTERIOVENOUS GRAFT;  Surgeon: FARIDEH Muñoz III, MD;  Location: [facility] OR 2ND FLR;  Service: Peripheral Vascular;  Laterality: Left;    REVISION OF PROCEDURE INVOLVING ARTERIOVENOUS GRAFT Left 07/21/2023    Procedure: LEFT ARM ARTERIOVENOUS GRAFT EXCISION  AND LIGATION;   Surgeon: Obi Werner MD;  Location: [facility] OR;  Service: Vascular;  Laterality: Left;  Left AVG excision and revision with interposition    SALPINGOOPHORECTOMY Right 2016    KJB---DAVINCI    TONSILLECTOMY      TUBAL LIGATION           Current Outpatient Medications:     acetaminophen (TYLENOL) 500 MG tablet, Take 1 tablet (500 mg total) by mouth every 4 (four) hours as needed for Pain or Temperature greater than (100.5 or greater)., Disp: 30 tablet, Rfl: 0    atorvastatin (LIPITOR) 40 MG tablet, Take 1 tablet (40 mg total) by mouth once daily., Disp: 90 tablet, Rfl: 1    levothyroxine (SYNTHROID) 75 MCG tablet, Take 1 tablet (75 mcg total) by mouth once daily., Disp: 90 tablet, Rfl: 2    lidocaine-prilocaine (EMLA) cream, APPLY ATLEAST 30 MINUTES BEFORE TREATMENT 3 TIMES A WEEK, Disp: 30 g, Rfl: 11    metoprolol succinate (TOPROL-XL) 25 MG 24 hr tablet, Take 1 tablet (25 mg total) by mouth once daily., Disp: 90 tablet, Rfl: 0    mirtazapine (REMERON) 7.5 MG Tab, Take 1 tablet (7.5 mg total) by mouth every evening., Disp: 90 tablet, Rfl: 2    apixaban (ELIQUIS) 2.5 mg Tab, Take 1 tablet (2.5 mg total) by mouth 2 (two) times daily., Disp: 60 tablet, Rfl: 5    aspirin (ECOTRIN) 81 MG EC tablet, Take 1 tablet (81 mg total) by mouth once daily., Disp: 90 tablet, Rfl: 2    epoetin david-epbx (RETACRIT INJ), , Disp: , Rfl:     naloxone (NARCAN) 1 mg/mL injection, 2 mg (1 mg per nostril) by Nasal route as needed for opioid overdose; may repeat in 3 to 5 minutes if not effective. Call 911, Disp: 2 mL, Rfl: 11    Current Facility-Administered Medications:     ceFAZolin 2 g, 2 g, Intravenous, Once, Corby Casey MD    Facility-Administered Medications Ordered in Other Encounters:     0.9%  NaCl infusion, , Intravenous, Continuous, Soni Bhatti MD, New Bag at 07/21/23 1116    Review of patient's allergies indicates:   Allergen Reactions    Carvedilol Other (See Comments)     Nausea/vomiting    Allopurinol       Other reaction(s): abnormal transaminases       Family History   Problem Relation Name Age of Onset    Hypertension Mother      Diabetes Father      Kidney disease Father      No Known Problems Sister Sophy     Heart disease Sister Maria A     No Known Problems Sister Claudine     No Known Problems Brother Dax     No Known Problems Brother Don     Cataracts Maternal Aunt      No Known Problems Maternal Uncle      No Known Problems Paternal Aunt      No Known Problems Paternal Uncle      No Known Problems Maternal Grandmother      Diabetes Maternal Grandfather      No Known Problems Paternal Grandmother      No Known Problems Paternal Grandfather      No Known Problems Son      No Known Problems Son      No Known Problems Other      Breast cancer Neg Hx      Colon cancer Neg Hx      Cancer Neg Hx      Stroke Neg Hx      Amblyopia Neg Hx      Blindness Neg Hx      Glaucoma Neg Hx      Macular degeneration Neg Hx      Retinal detachment Neg Hx      Strabismus Neg Hx      Thyroid disease Neg Hx         Social History     Tobacco Use    Smoking status: Never     Passive exposure: Never    Smokeless tobacco: Never   Substance Use Topics    Alcohol use: No    Drug use: Not Currently     Types: Hydrocodone       PHYSICAL EXAM:   /68 (BP Location: Right arm, Patient Position: Lying)   Pulse 78   Temp 97.8 °F (36.6 °C) (Oral)   Resp 18   LMP 10/24/2016   SpO2 97%   Breastfeeding No   Constitutional:  She appears very frail and deconditioned   Neurological: Normal speech  no focal findings  CN II - XII grossly intact.    Psychiatric: Mood and affect appropriate and symmetric.   HEENT: Normocephalic / atraumatic  PERRLA  Midline trachea  No scars across the neck   Cardiac: Regular rate and rhythm.   Pulmonary: Normal pulmonary effort.   Abdomen: Soft, not distended.     Skin: Warm and well perfused.    Vascular:  Radial pulses are nonpalpable bilaterally.   Extremities/  Musculoskeletal: No edema.   Right arm inc  well healed, bilateral toes and L heel with edema and dry ischemic changes       IMAGIN/2024  Right lower extremity pressures and waveforms indicate moderate to severe arterial occlusive disease.  Left lower extremity pressures and waveforms indicate moderate to severe arterial occlusive disease.  Ankle pressures are non-compressible indicating possible medial calcinosis.     Left lower extremity arterial ultrasound shows decreased external iliac artery flow indicating proximal stenosis with decreased distal femoropopliteal artery flow, heavily calcified arteries, an occluded PT and peroneal arteries.     IMPRESSION:   Status post excision, left AV graft remnant.    Patient's medical status continued to deteriorate and now off home hospice  AFib on Eliquis  Severe systolic dysfunction EF 20%  Metastatic renal cell to lung    2024  Right lower extremity pressures and waveforms indicate moderate arterial occlusive disease.  Left lower extremity pressures and waveforms indicate moderate arterial occlusive disease.  Ankle pressures are non-compressible indicating possible medial calcinosis.     Right lower extremity arterial ultrasound shows decreased flow in the external iliac artery and distally.  Left lower extremity arterial ultrasound shows 50% AT stenosis with an occluded PT artery.  There is decreased flow in the external iliac artery and distally.  Ankle pressures are non-compressible indicating possible medial calcinosis.       PLAN  Plan for LLE angiogram with Dr. Casey      -NUVIA / TP show moderate to severe arterial occlusive disease s/p L AT/PT PTA 2023 with improved waveforms - cont with Podiatry mgmt  -Wound care jovan Nuñez PA-C  Vascular & Endovascular Surgery  [facility]

## 2025-01-29 NOTE — TRANSFER OF CARE
Anesthesia Transfer of Care Note    Patient: Eva Bloom    Procedure(s) Performed: * No procedures listed *    Patient location: PACU    Anesthesia Type: MAC    Transport from OR: Transported from OR on room air with adequate spontaneous ventilation. Transported from OR on 2-3 L/min O2 by NC with adequate spontaneous ventilation    Post pain: adequate analgesia    Post assessment: no apparent anesthetic complications and tolerated procedure well    Post vital signs: stable    Level of consciousness: awake    Nausea/Vomiting: no nausea/vomiting    Complications: none    Transfer of care protocol was followed      Last vitals: Visit Vitals  /69   Pulse 77   Temp 36.5 °C (97.7 °F)   Resp 16   LMP 10/24/2016   SpO2 (!) 94%   Breastfeeding No

## 2025-01-29 NOTE — Clinical Note
Bilateral: Groin.   Scrubbed with Chlorhexidine/Alcohol.    Hair: N/A.  Skin prep dry before draping.  Prepped by: Burak Astorga,  1/29/2025 12:56 PM.

## 2025-01-29 NOTE — OP NOTE
Date: 01/29/2025      Surgeon: Corby Casey MD RPVI     Assistant: Brianna Nuñez PA-C      Pre-op Diagnosis:   1. Atherosclerosis left lower extremity with ulceration of foot  2.   Patient Active Problem List    Diagnosis Date Noted    Nuclear sclerosis of both eyes 01/27/2025    Metastatic renal cell carcinoma 07/22/2024    Ischemic ulcer of heel, left, with unspecified severity 07/22/2024    Pressure injury of left heel, unstageable 04/16/2024    Ischemic ulcer of left heel due to atherosclerosis 03/04/2024    Incontinence associated dermatitis 01/04/2024    PAD (peripheral artery disease) 11/01/2023    Severe protein-calorie malnutrition 10/11/2023    ACP (advance care planning) 10/11/2023    Weight loss 10/10/2023    Body mass index (BMI) less than 19 09/28/2023    Hoarseness of voice 09/21/2023    Sensation of fullness in both ears 09/21/2023    Metastatic renal cell carcinoma to lung, unspecified laterality 08/11/2023    Hemorrhoid 07/24/2023    Critical limb ischemia of left lower extremity with gangrene 07/20/2023    Ischemic ulcer of toe of left foot, with unspecified severity 07/19/2023    Hypothyroidism 07/19/2023    Aortic atherosclerosis 07/06/2023    Malfunction of arteriovenous dialysis fistula 12/16/2022    Debility 11/04/2022    PAF (paroxysmal atrial fibrillation) 07/25/2022    Hypercalcemia 04/04/2022    ICD (implantable cardioverter-defibrillator) in place - SubQ 07/15/2021    Hyperphosphatemia     Anemia in ESRD (end-stage renal disease)     Chronic kidney disease-mineral and bone disorder     Chronic combined systolic and diastolic congestive heart failure     Nonrheumatic mitral valve regurgitation 06/01/2020    ESRD (end stage renal disease) on dialysis     Cardiomyopathy, nonischemic 07/21/2017    Pulmonary hypertension 07/21/2017    Primary insomnia 06/14/2017    Stenosis of arteriovenous dialysis fistula 02/17/2017    S/p nephrectomy left 10/07/2016    History of kidney cancer  09/02/2016    Renal cell carcinoma of left kidney 01/27/2016    Multinodular goiter 11/25/2015    CMV (cytomegalovirus) antibody positive     Herpes simplex type 1 antibody positive     H/O herpes simplex type 2 infection     Essential hypertension 09/23/2015    Pap smear abnormality of cervix with ASCUS favoring benign 09/23/2015    Urate nephropathy     Kidney stones     Hyperparathyroidism, secondary renal     Hyperuricemia without signs of inflammatory arthritis and tophaceous disease     Proteinuria        Post-op Diagnosis: Same     Procedures:   1. US guided R CFA percutaneous access  2. R femoral angiogram  3. Aortogram  4. Moderate sedation  5. LLE angiogram  6. Balloon angioplasty of the left AT with 2x220 Ultraverse balloon  7. 5fr Mynx     Anesthesia: Local     EBL: Minimal     Findings: Greater than 75% stenosis of the AT.  Successful revascularization / resolution of stenosis     Complications:  None; patient tolerated the procedure well.     Disposition: Recovery- hemodynamically stable in good condition     Attending Attestation: I was present and scrubbed for the entire procedure.  After an informed consent was obtained the patient was brought to the interventional suite and placed in the supine position. Both the patient and procedure were confirmed and identified during timeout process. The patient received perioperative antibiotics. The patient's bilateral groins were prepped and draped in usual sterile fashion. Using ultrasound guidance, the R common femoral artery vessel patency was confirmed. Then direct ultrasound guidance the R CFA was entered with a 21-G needle, Mandril wire and 4-Fr micropuncture needle. Then under fluoroscopic guidance, an 0.035-in wire was placed into the distal aorta. The micropuncture dilator was then exchanged over the wire for a 6-Cam[place]onian sheath. Sheathogram demonstrated access in the CFA. Next a VCF-catheter was placed over the wire and into the distal aorta under  fluoroscopy and an aortogram was performed.  The common iliac artery was then selected with the wire and the catheter was advanced into the external iliac artery over the wire and a leg angiogram was performed which demonstrated the above findings.    Based on the images from this diagnostic angio, a decision was made to intervene.    We had systemically heparinized the patient with 5000u of heparin.   Next, we placed a up-and-over sheath and used a 0.35 stiff angled Glide wire was used to select the popliteal artery.   Treatment of the AT artery was done with the balloon(s) listed above. A follow-up angiogram showed resolution of the lesions. Heparin was reversed with protamine. We then deployed 5fr Mynx closure device without issue. At the conclusion of the case the patient was noted to have no evidence of a groin hematoma. All instrument and sponge counts were correct at the end of the case. The patient tolerated the procedure well and was transferred to the pacu for further recovery.      MODERATE SEDATION   I was present for moderate sedation and monitored the patients cardio respiratory functions during the procedure. See nurses notes for Intra-service start and end times and for the log of the medications, doseage and route.     Time of sedation:  60 mins.

## 2025-01-29 NOTE — DISCHARGE INSTRUCTIONS
Fall Prevention  Millions of people fall every year and injure themselves. You may have had anesthesia or sedation which may increase your risk of falling. You may have health issues that put you at an increased risk of falling.     Here are ways to reduce your risk of falling.    Make your home safe by keeping walkways clear of objects you may trip over.  Use non-slip pads under rugs. Do not use area rugs or small throw rugs.  Use non-slip mats in bathtubs and showers.  Install handrails and lights on staircases.  Do not walk in poorly lit areas.  Do not stand on chairs or wobbly ladders.  Use caution when reaching overhead or looking upward. This position can cause a loss of balance.  Be sure your shoes fit properly, have non-slip bottoms and are in good condition.   Wear shoes both inside and out. Avoid going barefoot or wearing slippers.  Be cautious when going up and down stairs, curbs, and when walking on uneven sidewalks.  If your balance is poor, consider using a cane or walker.  If your fall was related to alcohol use, stop or limit alcohol intake.   If your fall was related to use of sleeping medicines, talk to your doctor about this. You may need to reduce your dosage at bedtime if you awaken during the night to go to the bathroom.    To reduce the need for nighttime bathroom trips:  Avoid drinking fluids for several hours before going to bed  Empty your bladder before going to bed  Men can keep a urinal at the bedside  Stay as active as you can. Balance, flexibility, strength, and endurance all come from exercise. They all play a role in preventing falls. Ask your healthcare provider which types of activity are right for you.  Get your vision checked on a regular basis.  If you have pets, know where they are before you stand up or walk so you don't trip over them.  Use night lights.      ACTIVITY LEVEL: If you have received sedation or an anesthetic, you may feel sleepy for several hours. Rest  until  you are more awake. Slowly resume your normal activities. No heavy lifting, nothing more that 10-15 pounds until surgery site is healed.      NO driving, alcoholic beverages or signing legal documents for next 24 hours or while taking pain medication    BATHING: You may shower after your dressing is removed, but no tub baths, hot tubs, saunas or swimming until you see the doctor.    DIET: You may resume your home diet. If nausea is present, increase your diet gradually with fluids and bland foods. Drink extra water for the next 48 hours.     Medications: Pain medication should be taken only if needed and as directed. IF antibiotics are prescribed, the  medication should be taken until completed. You will be given an updated list of you medications.    CALL THE DOCTOR:  Fever over 101°F --any signs of infection including body aches, fever, chills, redness at surgery site   Severe pain that doesnt go away with medication.  Upset stomach and vomiting that is persistent  Problems urinating, unable to urinate or heavy bleeding (with or without clots)      Fall Prevention  Millions of people fall every year and injure themselves. You may have had anesthesia or sedation which may increase your risk of falling. You may have health issues that put you at an increased risk of falling.     Here are ways to reduce your risk of falling.    Make your home safe by keeping walkways clear of objects you may trip over.  Use non-slip pads under rugs. Do not use area rugs or small throw rugs.  Use non-slip mats in bathtubs and showers.  Install handrails and lights on staircases.  Do not walk in poorly lit areas.  Do not stand on chairs or wobbly ladders.  Use caution when reaching overhead or looking upward. This position can cause a loss of balance.  Be sure your shoes fit properly, have non-slip bottoms and are in good condition.   Wear shoes both inside and out. Avoid going barefoot or wearing slippers.  Be cautious when going up  and down stairs, curbs, and when walking on uneven sidewalks.  If your balance is poor, consider using a cane or walker.  If your fall was related to alcohol use, stop or limit alcohol intake.   If your fall was related to use of sleeping medicines, talk to your doctor about this. You may need to reduce your dosage at bedtime if you awaken during the night to go to the bathroom.    To reduce the need for nighttime bathroom trips:  Avoid drinking fluids for several hours before going to bed  Empty your bladder before going to bed  Men can keep a urinal at the bedside  Stay as active as you can. Balance, flexibility, strength, and endurance all come from exercise. They all play a role in preventing falls. Ask your healthcare provider which types of activity are right for you.  Get your vision checked on a regular basis.  If you have pets, know where they are before you stand up or walk so you don't trip over them.  Use night lights. Drink plenty of fluids for the next 48 hours and follow your doctor's diet orders.    Rest for the next 72 hours.     If your puncture site is on the groin, do not keep the injected leg bent for a long period of time.    Remove the dressing in 24 hours, and you may shower. Clean the area with soap and water; apply an adhesive bandage over the puncture site for the  next 5 days.    No Lifting over 5-10 lbs., that is, not more than 1 gallon of water, or straining for 72 hours.    No driving, no drinking alcohol, and no signing legal documents for the next 24 hours.    Call your doctor for elevated temperature, shortness of breath, chest pain, or cold discolored arm/leg.     If oozing occurs at the injections site, lie down. Apply pressure with a clean wash cloth for 20 to 30 minutes and call  your doctor.    If severe bleeding occurs, lie down, apply pressure. Call 911 and request an ambulance to take you to the nearest  hospital emergency room.    Continue to take your regular medications  as instructed.    Follow the instructions in the handout given to you.

## 2025-01-29 NOTE — ANESTHESIA PREPROCEDURE EVALUATION
01/29/2025  Eva Bloom is a 59 y.o., female.    Past Medical History:   Diagnosis Date    Acute metabolic encephalopathy 01/02/2024    Anemia     Anticoagulant long-term use     Atrial fibrillation     Bronchitis 03/01/2017    Cancer 2016    kidney cancer    CHF (congestive heart failure), NYHA class II, chronic, systolic     CMV (cytomegalovirus) antibody positive     CVA (cerebral vascular accident) 01/03/2024    Encounter for blood transfusion     ESRD (end stage renal disease)     Essential hypertension 09/23/2015    H/O herpes simplex type 2 infection     Herpes simplex type 1 antibody positive     History of kidney cancer     s/p left nephrectomy 1/2016    Hyperparathyroidism, unspecified     Hyperuricemia without signs of inflammatory arthritis and tophaceous disease     Kidney stones     LGSIL (low grade squamous intraepithelial dysplasia)     Myocardiopathy 07/21/2017    Prediabetes     Proteinuria     Renal disorder     Thyroid nodule     Urate nephropathy        Pre-op Assessment    I have reviewed the Patient Summary Reports.          Review of Systems  Social:  Non-Smoker       Cardiovascular:     Hypertension       CHF                Congestive Heart Failure (CHF)                Hypertension     Atrial Fibrillation     Renal/:  Chronic Renal Disease        Kidney Function/Disease             Musculoskeletal:  Musculoskeletal Normal                Neurological:   CVA                       CVA - Cerebrovasular Accident                 Endocrine:   Hypothyroidism       Hypothyroidism          Dermatological:  Skin Normal    Psych:  Psychiatric Normal                  Lab Results   Component Value Date    WBC 5.04 01/10/2025    HGB 10.4 (L) 01/28/2025    HCT 40.3 01/10/2025    MCV 92 01/10/2025     01/10/2025         Chemistry        Component Value Date/Time     01/29/2025 1017     K 4.7 01/29/2025 1017    K 4.7 01/29/2025 1017    CL 96 01/29/2025 1017    CO2 27 01/29/2025 1017    BUN 46 (H) 01/29/2025 1017    CREATININE 5.8 (H) 01/29/2025 1017    GLU 68 (L) 01/29/2025 1017        Component Value Date/Time    CALCIUM 8.9 01/29/2025 1017    ALKPHOS 647 (H) 09/16/2024 1050    AST 47 (H) 09/16/2024 1050    ALT 27 09/16/2024 1050    BILITOT 0.7 09/16/2024 1050    ESTGFRAFRICA 13 (A) 06/28/2022 0959    EGFRNONAA 11 (A) 06/28/2022 0959            Physical Exam  General: Well nourished    Airway:  Mallampati: I   Mouth Opening: Normal  Tongue: Normal  Neck ROM: Normal ROM    Dental:  Intact    Chest/Lungs:  Clear to auscultation    Heart:  Rate: Normal  Rhythm: Regular Rhythm  Sounds: Normal        Anesthesia Plan  Type of Anesthesia, risks & benefits discussed:    Anesthesia Type: Gen Natural Airway, MAC  Intra-op Monitoring Plan: Standard ASA Monitors  Induction:  IV  Informed Consent: Informed consent signed with the Patient and all parties understand the risks and agree with anesthesia plan.  All questions answered. Patient consented to blood products? Yes  ASA Score: 4    Ready For Surgery From Anesthesia Perspective.     .

## 2025-01-29 NOTE — PLAN OF CARE
Pt alert drinking well co discomfort that tolerable.no drainage noted or sign of hematoma to right groin.pt desires to go home

## 2025-01-30 ENCOUNTER — TELEPHONE (OUTPATIENT)
Dept: INTERVENTIONAL RADIOLOGY/VASCULAR | Facility: HOSPITAL | Age: 60
End: 2025-01-30
Payer: COMMERCIAL

## 2025-01-30 ENCOUNTER — TELEPHONE (OUTPATIENT)
Dept: VASCULAR SURGERY | Facility: CLINIC | Age: 60
End: 2025-01-30
Payer: COMMERCIAL

## 2025-01-30 NOTE — ANESTHESIA POSTPROCEDURE EVALUATION
Anesthesia Post Evaluation    Patient: Eva Bloom    Procedure(s) Performed: * No procedures listed *    Final Anesthesia Type: MAC      Patient location during evaluation: GI PACU  Patient participation: Yes- Able to Participate  Level of consciousness: awake and alert, oriented and awake  Post-procedure vital signs: reviewed and stable  Airway patency: patent    PONV status at discharge: No PONV  Anesthetic complications: no      Cardiovascular status: blood pressure returned to baseline  Respiratory status: unassisted, spontaneous ventilation and room air  Hydration status: euvolemic  Follow-up not needed.              Vitals Value Taken Time   /76 01/29/25 1533   Temp 36.3 °C (97.3 °F) 01/29/25 1551   Pulse 75 01/29/25 1534   Resp 19 01/29/25 1534   SpO2 86 % 01/29/25 1533   Vitals shown include unfiled device data.      Event Time   Out of Recovery 15:35:46         Pain/Mimi Score: Pain Rating Prior to Med Admin: 5 (1/29/2025  3:51 PM)  Mimi Score: 10 (1/29/2025  4:40 PM)

## 2025-02-04 ENCOUNTER — TELEPHONE (OUTPATIENT)
Dept: INTERVENTIONAL RADIOLOGY/VASCULAR | Facility: HOSPITAL | Age: 60
End: 2025-02-04
Payer: COMMERCIAL

## 2025-02-05 ENCOUNTER — TELEPHONE (OUTPATIENT)
Dept: INTERVENTIONAL RADIOLOGY/VASCULAR | Facility: HOSPITAL | Age: 60
End: 2025-02-05
Payer: COMMERCIAL

## 2025-02-06 ENCOUNTER — DOCUMENT SCAN (OUTPATIENT)
Dept: HOME HEALTH SERVICES | Facility: HOSPITAL | Age: 60
End: 2025-02-06
Payer: COMMERCIAL

## 2025-02-12 ENCOUNTER — HOSPITAL ENCOUNTER (OUTPATIENT)
Dept: RADIOLOGY | Facility: HOSPITAL | Age: 60
Discharge: HOME | End: 2025-02-12
Attending: INTERNAL MEDICINE
Payer: MEDICARE

## 2025-02-12 DIAGNOSIS — Z12.31 ENCOUNTER FOR SCREENING MAMMOGRAM FOR BREAST CANCER: ICD-10-CM

## 2025-02-12 PROCEDURE — 77063 BREAST TOMOSYNTHESIS BI: CPT | Mod: TC,PO

## 2025-02-12 PROCEDURE — 77063 BREAST TOMOSYNTHESIS BI: CPT | Mod: 26,GV,, | Performed by: RADIOLOGY

## 2025-02-12 PROCEDURE — 77067 SCR MAMMO BI INCL CAD: CPT | Mod: 26,GV,, | Performed by: RADIOLOGY

## 2025-02-15 ENCOUNTER — LAB VISIT (OUTPATIENT)
Dept: LAB | Facility: HOSPITAL | Age: 60
End: 2025-02-15
Payer: MEDICARE

## 2025-02-15 DIAGNOSIS — N18.6 ESRD (END STAGE RENAL DISEASE) ON DIALYSIS: ICD-10-CM

## 2025-02-15 DIAGNOSIS — C64.2 METASTATIC RENAL CELL CARCINOMA, LEFT: ICD-10-CM

## 2025-02-15 DIAGNOSIS — C79.89 METASTATIC RENAL CELL CARCINOMA TO INTRA-ABDOMINAL SITE: ICD-10-CM

## 2025-02-15 DIAGNOSIS — I51.89 COMBINED SYSTOLIC AND DIASTOLIC CARDIAC DYSFUNCTION: ICD-10-CM

## 2025-02-15 DIAGNOSIS — E83.52 HYPERCALCEMIA: ICD-10-CM

## 2025-02-15 DIAGNOSIS — S32.591A CLOSED FRACTURE OF MULTIPLE RAMI OF RIGHT PUBIS, INITIAL ENCOUNTER: ICD-10-CM

## 2025-02-15 DIAGNOSIS — C64.9 METASTATIC RENAL CELL CARCINOMA TO INTRA-ABDOMINAL SITE: ICD-10-CM

## 2025-02-15 DIAGNOSIS — Z99.2 ESRD (END STAGE RENAL DISEASE) ON DIALYSIS: ICD-10-CM

## 2025-02-15 DIAGNOSIS — Z90.5 S/P NEPHRECTOMY: ICD-10-CM

## 2025-02-15 LAB
ALBUMIN SERPL BCP-MCNC: 3.5 G/DL (ref 3.5–5.2)
ALP SERPL-CCNC: 780 U/L (ref 40–150)
ALT SERPL W/O P-5'-P-CCNC: 20 U/L (ref 10–44)
ANION GAP SERPL CALC-SCNC: 19 MMOL/L (ref 8–16)
AST SERPL-CCNC: 35 U/L (ref 10–40)
BILIRUB SERPL-MCNC: 1 MG/DL (ref 0.1–1)
BUN SERPL-MCNC: 26 MG/DL (ref 6–20)
CALCIUM SERPL-MCNC: 9.9 MG/DL (ref 8.7–10.5)
CHLORIDE SERPL-SCNC: 95 MMOL/L (ref 95–110)
CO2 SERPL-SCNC: 26 MMOL/L (ref 23–29)
CREAT SERPL-MCNC: 3.7 MG/DL (ref 0.5–1.4)
ERYTHROCYTE [DISTWIDTH] IN BLOOD BY AUTOMATED COUNT: 19.6 % (ref 11.5–14.5)
EST. GFR  (NO RACE VARIABLE): 13 ML/MIN/1.73 M^2
GLUCOSE SERPL-MCNC: 74 MG/DL (ref 70–110)
HCT VFR BLD AUTO: 44.8 % (ref 37–48.5)
HGB BLD-MCNC: 13.6 G/DL (ref 12–16)
IMM GRANULOCYTES # BLD AUTO: 0.02 K/UL (ref 0–0.04)
MCH RBC QN AUTO: 29 PG (ref 27–31)
MCHC RBC AUTO-ENTMCNC: 30.4 G/DL (ref 32–36)
MCV RBC AUTO: 96 FL (ref 82–98)
NEUTROPHILS # BLD AUTO: 3.3 K/UL (ref 1.8–7.7)
PLATELET # BLD AUTO: 212 K/UL (ref 150–450)
PMV BLD AUTO: 10.4 FL (ref 9.2–12.9)
POTASSIUM SERPL-SCNC: 3.8 MMOL/L (ref 3.5–5.1)
PROT SERPL-MCNC: 10.1 G/DL (ref 6–8.4)
RBC # BLD AUTO: 4.69 M/UL (ref 4–5.4)
SODIUM SERPL-SCNC: 140 MMOL/L (ref 136–145)
WBC # BLD AUTO: 4.74 K/UL (ref 3.9–12.7)

## 2025-02-15 PROCEDURE — 85027 COMPLETE CBC AUTOMATED: CPT | Performed by: NURSE PRACTITIONER

## 2025-02-15 PROCEDURE — 36415 COLL VENOUS BLD VENIPUNCTURE: CPT | Mod: PO | Performed by: NURSE PRACTITIONER

## 2025-02-15 PROCEDURE — 80053 COMPREHEN METABOLIC PANEL: CPT | Performed by: NURSE PRACTITIONER

## 2025-02-17 ENCOUNTER — HOSPITAL ENCOUNTER (OUTPATIENT)
Dept: RADIOLOGY | Facility: HOSPITAL | Age: 60
Discharge: HOME | End: 2025-02-17
Attending: INTERNAL MEDICINE
Payer: MEDICARE

## 2025-02-17 DIAGNOSIS — C64.2 METASTATIC RENAL CELL CARCINOMA, LEFT: ICD-10-CM

## 2025-02-17 PROCEDURE — 71250 CT THORAX DX C-: CPT | Mod: TC

## 2025-02-18 ENCOUNTER — HOSPITAL ENCOUNTER (OUTPATIENT)
Dept: CARDIOLOGY | Facility: HOSPITAL | Age: 60
Discharge: HOME | End: 2025-02-18
Payer: MEDICARE

## 2025-02-18 DIAGNOSIS — I70.262 CRITICAL LIMB ISCHEMIA OF LEFT LOWER EXTREMITY WITH GANGRENE: ICD-10-CM

## 2025-02-18 PROCEDURE — 93922 UPR/L XTREMITY ART 2 LEVELS: CPT

## 2025-02-18 PROCEDURE — 93925 LOWER EXTREMITY STUDY: CPT

## 2025-02-18 NOTE — PROGRESS NOTES
"  Subjective:       Patient ID: Eva Bloom is a 59 y.o. female.    Chief Complaint: RCC    HPI     59 y.o.female to clinic for follow up for metastatic RCC. Patient was previously on hospice but now not enrolled as she wants to continue dialysis. She is feeling stronger, remains off cabozantinib. Pt still is on ESRD with HD every TTS.      ECOG 3. She is accompanied with her . She presents in a wheelchair.      Oncologic History (From Chart and Patient):  Eva Bloom is a 59 y.o.female with ESRD on HD who was found to have two L renal masses. She underwent L lap nephrectomy on 1/12/16. 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 Margins were negative.  She healed from surgery well, but then developed a large ovarian mass.  This was removed by gyn along with multiple sites of metastatic disease in the pelvis.  Path was c/w recurrence of RCC.     11/20/16 Pelvic ultrasound reveals "Large heterogeneous cystic and solid mass which appears to arise from the right ovary with worrisome imaging features for malignancy.  Differential diagnosis includes malignant tumors such as serous or mucinous cystadenocarcinoma.  It is important to note that the patient is at risk for ovarian torsion due to the size of the mass.Multiple uterine fibroids are identified with the largest in the uterine fundus."    11/22/16 pathology reveals "FINAL PATHOLOGIC DIAGNOSIS-RIGHT OVARY AND FALLOPIAN TUBE, RIGHT SALPINGO-OOPHORECTOMY:  -Positive for malignancy, high grade carcinoma morphologically and immunohistochemically consistent with metastasis from the patient's known renal cell carcinoma"    Review of Systems   Constitutional:  Positive for activity change, appetite change, fatigue and unexpected weight change. Negative for chills and fever.   HENT:  Negative for congestion, hearing loss, mouth sores, " postnasal drip, sore throat, tinnitus and voice change.    Eyes:  Negative for pain and visual disturbance.   Respiratory:  Negative for cough, shortness of breath and wheezing.    Cardiovascular:  Negative for chest pain, palpitations and leg swelling.   Gastrointestinal:  Negative for abdominal pain, constipation, diarrhea, nausea and vomiting.   Endocrine: Negative for cold intolerance and heat intolerance.   Genitourinary:  Negative for difficulty urinating, dyspareunia, dysuria, frequency, menstrual problem, urgency, vaginal bleeding, vaginal discharge and vaginal pain.   Musculoskeletal:  Negative for arthralgias and myalgias.        LE ulcerations and pain    Skin:  Negative for color change, rash and wound.   Allergic/Immunologic: Negative for environmental allergies and food allergies.   Neurological:  Negative for weakness, numbness and headaches.   Hematological:  Negative for adenopathy. Does not bruise/bleed easily.   Psychiatric/Behavioral:  Negative for agitation, confusion, hallucinations and sleep disturbance. The patient is not nervous/anxious.    All other systems reviewed and are negative.      Allergies:  Review of patient's allergies indicates:   Allergen Reactions    Allopurinol      Other reaction(s): abnormal transaminases       Medications:  Current Outpatient Medications   Medication Sig Dispense Refill    acetaminophen (TYLENOL) 500 MG tablet Take 1 tablet (500 mg total) by mouth every 4 (four) hours as needed for Pain or Temperature greater than (100.5 or greater). 30 tablet 0    apixaban (ELIQUIS) 2.5 mg Tab Take 1 tablet (2.5 mg total) by mouth 2 (two) times daily. 60 tablet 5    aspirin (ECOTRIN) 81 MG EC tablet Take 1 tablet (81 mg total) by mouth once daily. 90 tablet 2    atorvastatin (LIPITOR) 40 MG tablet Take 1 tablet (40 mg total) by mouth once daily. 90 tablet 1    levothyroxine (SYNTHROID) 75 MCG tablet Take 1 tablet (75 mcg total) by mouth once daily. 90 tablet 2     lidocaine-prilocaine (EMLA) cream APPLY ATLEAST 30 MINUTES BEFORE TREATMENT 3 TIMES A WEEK 30 g 11    metoprolol succinate (TOPROL-XL) 25 MG 24 hr tablet Take 1 tablet (25 mg total) by mouth once daily. 90 tablet 0    mirtazapine (REMERON) 7.5 MG Tab Take 1 tablet (7.5 mg total) by mouth every evening. 90 tablet 2    naloxone (NARCAN) 1 mg/mL injection 2 mg (1 mg per nostril) by Nasal route as needed for opioid overdose; may repeat in 3 to 5 minutes if not effective. Call 911 2 mL 11     No current facility-administered medications for this visit.     Facility-Administered Medications Ordered in Other Visits   Medication Dose Route Frequency Provider Last Rate Last Admin    0.9%  NaCl infusion   Intravenous Continuous Soni Bhatti MD   Stopped at 01/29/25 1427       PMH:  Past Medical History:   Diagnosis Date    Acute metabolic encephalopathy 01/02/2024    Anemia     Anticoagulant long-term use     Atrial fibrillation     Bronchitis 03/01/2017    Cancer 2016    kidney cancer    CHF (congestive heart failure), NYHA class II, chronic, systolic     CMV (cytomegalovirus) antibody positive     CVA (cerebral vascular accident) 01/03/2024    Encounter for blood transfusion     ESRD (end stage renal disease)     Essential hypertension 09/23/2015    H/O herpes simplex type 2 infection     Herpes simplex type 1 antibody positive     History of kidney cancer     s/p left nephrectomy 1/2016    Hyperparathyroidism, unspecified     Hyperuricemia without signs of inflammatory arthritis and tophaceous disease     Kidney stones     LGSIL (low grade squamous intraepithelial dysplasia)     Myocardiopathy 07/21/2017    Prediabetes     Proteinuria     Renal disorder     Thyroid nodule     Urate nephropathy        PSH:  Past Surgical History:   Procedure Laterality Date    ANGIOGRAPHY OF LOWER EXTREMITY Right 11/11/2024    Procedure: Angiogram Extremity Unilateral;  Surgeon: Corby Casey MD;  Location: [facility] OR [address] [place] [address];   Service: Vascular;  Laterality: Right;  mGy 63.61      Gycm2   11.8042        Fluro time  13.5min      Contrast amount 40ml    ANGIOPLASTY, PERIPHERAL BLOOD VESSEL Right 11/11/2024    Procedure: ANGIOPLASTY, PERIPHERAL BLOOD VESSEL;  Surgeon: Corby Casey MD;  Location: [facility] OR [facility] FLR;  Service: Vascular;  Laterality: Right;    BREAST CYST EXCISION      COLONOSCOPY N/A 11/12/2015    COLONOSCOPY N/A 03/12/2021    Procedure: COLONOSCOPY;  Surgeon: Brendon Lanier MD;  Location: [facility] ENDO;  Service: Endoscopy;  Laterality: N/A;  covid test 3/9, labs prior, prep instr mailed -ml    EXCISION OF ARTERIOVENOUS FISTULA Left 09/27/2023    Procedure: EXCISION, AV FISTULA;  Surgeon: FARIDEH Muñoz III, MD;  [facility];  Service: Vascular;  Laterality: Left;  LUE AV graft excision    INSERTION OF BIVENTRICULAR IMPLANTABLE CARDIOVERTER-DEFIBRILLATOR (ICD)  04/2021    INSERTION OF TUNNELED CENTRAL VENOUS CATHETER (CVC) WITH SUBCUTANEOUS PORT N/A 01/25/2023    Procedure: INSERTION, DUAL LUMEN CATHETER WITH PORT, WITH IMAGING GUIDANCE;  Surgeon: FARIDEH Muñoz III, MD;  [facility];  Service: Peripheral Vascular;  Laterality: N/A;  possinble permcath placment    NEPHRECTOMY-LAPAROSCOPIC Left 01/12/2016    PERCUTANEOUS TRANSLUMINAL ANGIOPLASTY OF ARTERIOVENOUS FISTULA Left 04/19/2023    Procedure: PTA, AV FISTULA;  Surgeon: FARIDEH Muñoz III, MD;  Location: [facility] CATH LAB;  Service: Peripheral Vascular;  Laterality: Left;    PERITONEAL CATHETER INSERTION      Permacath insertion  01/12/2017    PLACEMENT OF ARTERIOVENOUS GRAFT  12/28/2022    Procedure: INSERTION, GRAFT, ARTERIOVENOUS;  Surgeon: FARIDEH Muñoz III, MD;  Location: [facility] OR [facility] FLR;  Service: Peripheral Vascular;;    REMOVAL, GRAFT, ARTERIOVENOUS, UPPER EXTREMITY Left 01/25/2023    Procedure: REMOVAL, GRAFT, ARTERIOVENOUS, UPPER EXTREMITY;  Surgeon: FARIDEH Muñoz III, MD;  [facility];  Service: Peripheral  Vascular;  Laterality: Left;    REVISION OF ARTERIOVENOUS FISTULA Left 05/01/2023    Procedure: REVISION, AV FISTULA;  Surgeon: FARIDEH Muñoz III, MD;  [facility];  Service: Peripheral Vascular;  Laterality: Left;  LUE AVG revision    REVISION OF PROCEDURE INVOLVING ARTERIOVENOUS GRAFT Left 12/28/2022    Procedure: REVISION, PROCEDURE INVOLVING ARTERIOVENOUS GRAFT;  Surgeon: FARIDEH Muñoz III, MD;  Location: [facility] OR 2ND FLR;  Service: Peripheral Vascular;  Laterality: Left;    REVISION OF PROCEDURE INVOLVING ARTERIOVENOUS GRAFT Left 07/21/2023    Procedure: LEFT ARM ARTERIOVENOUS GRAFT EXCISION  AND LIGATION;  Surgeon: Obi Werner MD;  Location: [facility] OR;  Service: Vascular;  Laterality: Left;  Left AVG excision and revision with interposition    SALPINGOOPHORECTOMY Right 2016    KJB---DAVINCI    TONSILLECTOMY      TUBAL LIGATION         FamHx:  Family History   Problem Relation Name Age of Onset    Hypertension Mother      Diabetes Father      Kidney disease Father      No Known Problems Sister Sophy     Heart disease Sister Maria A     No Known Problems Sister Claudine     No Known Problems Brother Dax     No Known Problems Brother Don     Cataracts Maternal Aunt      No Known Problems Maternal Uncle      No Known Problems Paternal Aunt      No Known Problems Paternal Uncle      No Known Problems Maternal Grandmother      Diabetes Maternal Grandfather      No Known Problems Paternal Grandmother      No Known Problems Paternal Grandfather      No Known Problems Son      No Known Problems Son      No Known Problems Other      Breast cancer Neg Hx      Colon cancer Neg Hx      Cancer Neg Hx      Stroke Neg Hx      Amblyopia Neg Hx      Blindness Neg Hx      Glaucoma Neg Hx      Macular degeneration Neg Hx      Retinal detachment Neg Hx      Strabismus Neg Hx      Thyroid disease Neg Hx         SocHx:  Social History     Socioeconomic History    Marital status:     Number of  children: 2   Occupational History    Occupation: lucero     Employer: [id number]   Tobacco Use    Smoking status: Never     Passive exposure: Never    Smokeless tobacco: Never   Substance and Sexual Activity    Alcohol use: No    Drug use: Not Currently     Types: Hydrocodone    Sexual activity: Not Currently     Social Drivers of Health     Financial Resource Strain: Low Risk  (9/28/2023)    Overall Financial Resource Strain (CARDIA)     Difficulty of Paying Living Expenses: Not hard at all   Food Insecurity: No Food Insecurity (9/28/2023)    Hunger Vital Sign     Worried About Running Out of Food in the Last Year: Never true     Ran Out of Food in the Last Year: Never true   Transportation Needs: No Transportation Needs (9/28/2023)    PRAPARE - Transportation     Lack of Transportation (Medical): No     Lack of Transportation (Non-Medical): No   Physical Activity: Inactive (9/28/2023)    Exercise Vital Sign     Days of Exercise per Week: 0 days     Minutes of Exercise per Session: 0 min   Stress: No Stress Concern Present (9/28/2023)    [place] [employer] - Occupational Stress Questionnaire     Feeling of Stress : Not at all   Housing Stability: Low Risk  (9/28/2023)    Housing Stability Vital Sign     Unable to Pay for Housing in the Last Year: No     Number of Places Lived in the Last Year: 1     Unstable Housing in the Last Year: No         Objective:     There were no vitals filed for this visit.        Physical Exam  Vitals and nursing note reviewed.   Constitutional:       General: She is not in acute distress.     Appearance: She is well-developed.      Comments: Thin appearance, in wheelchair   HENT:      Head: Normocephalic and atraumatic.      Nose: Nose normal.      Mouth/Throat:      Pharynx: No oropharyngeal exudate.   Eyes:      General:         Right eye: No discharge.         Left eye: No discharge.      Conjunctiva/sclera: Conjunctivae normal.      Pupils: Pupils  are equal, round, and reactive to light.   Neck:      Trachea: No tracheal deviation.   Cardiovascular:      Comments: No swelling to bilateral lower extremities.   Musculoskeletal:         General: No tenderness. Normal range of motion.      Comments:      Skin:     General: Skin is warm and dry.      Coloration: Skin is not pale.      Findings: No erythema or rash.      Comments: Ulcerations on LE   Neurological:      Mental Status: She is alert and oriented to person, place, and time.   Psychiatric:         Behavior: Behavior normal.         Thought Content: Thought content normal.        LABS:  WBC   Date Value Ref Range Status   02/15/2025 4.74 3.90 - 12.70 K/uL Final     Hemoglobin   Date Value Ref Range Status   02/15/2025 13.6 12.0 - 16.0 g/dL Final     POC Hematocrit   Date Value Ref Range Status   10/25/2024 41 36 - 54 %PCV Final     Hematocrit   Date Value Ref Range Status   02/15/2025 44.8 37.0 - 48.5 % Final     Platelets   Date Value Ref Range Status   02/15/2025 212 150 - 450 K/uL Final       Chemistry        Component Value Date/Time     02/15/2025 0847    K 3.8 02/15/2025 0847    CL 95 02/15/2025 0847    CO2 26 02/15/2025 0847    BUN 26 (H) 02/15/2025 0847    CREATININE 3.7 (H) 02/15/2025 0847    GLU 74 02/15/2025 0847        Component Value Date/Time    CALCIUM 9.9 02/15/2025 0847    ALKPHOS 780 (H) 02/15/2025 0847    AST 35 02/15/2025 0847    ALT 20 02/15/2025 0847    BILITOT 1.0 02/15/2025 0847        CT Chest Abdomen Pelvis Without Contrast (XPD)  Narrative: EXAMINATION:  CT CHEST ABDOMEN PELVIS WITHOUT CONTRAST(XPD)    CLINICAL HISTORY:  Kidney cancer, recurrence; Malignant neoplasm of left kidney, except renal pelvis    TECHNIQUE:  Low dose axial images, sagittal and coronal reformations were obtained from the thoracic inlet to the pubic symphysis without the administration of contrast.    COMPARISON:  CT chest 09/16/2024    FINDINGS:  Evaluation of solid organs is suboptimal due to the  absence of intravenous contrast.    CHEST:    Neck and thoracic soft tissues: Partially calcified left axillary pseudoaneurysms are seen.  Left-sided AICD.    Vasculature: Left sided aortic arch. Thoracic aorta is nonaneurysmal.  Severe calcific atherosclerosis of the thoracic aorta . Enlargement of pulmonary artery.    Heart: Cardiomegaly.  Severe calcific atherosclerosis of the coronary arteries. Multiple valvular calcifications are seen.  Small pericardial effusion.    Airways: Central airways are clear.    Lungs/Pleura: Stable cluster of calcified nodules within the anterior right upper lobe (series 6, image 85).  Bilateral dependent atelectasis.  Few additional nodules for example 0.5 cm right lower lobe nodule (series 6, image 253) and right lower lobe measuring 5 mm on image 215 of series 6.  These remain without change.   No evidence of consolidation, mass, significant pleural thickening, or pleural fluid.    Hilum/Mediastinum: No hilar or mediastinal lymph node enlargement.    Esophagus: No significant abnormality.    ABDOMEN/PELVIS:    Liver: The liver surface appears nodular suggestive of hepatic cirrhosis.  Solid masses can be neither adequately evaluated nor definitively excluded in the absence of intravenous contrast.    Gallbladder: Calcified gallstones are present.  There calcifications along the gallbladder wall which could represent sequela of chronic inflammation versus adherent gallstones.  No pericholecystic fluid or fat stranding.    Bile ducts: No intrahepatic or extrahepatic biliary ductal dilatation.    Spleen: Normal size. Nonspecific hypodensity in the superior spleen likely benign.    Pancreas: No peripancreatic fat stranding.    Adrenals: No significant abnormality    Renal/Ureters: Left kidney is surgically absent.  No evidence of residual the mass in the left renal bed..  There is atrophy of the right kidney.  Renovascular calcifications results in suboptimal evaluation for stones.   Multiple simple and minimally complex lesions are seen in the right kidney.   No hydroureteronephrosis. The urinary bladder is unremarkable.    Reproductive: Uterus is without significant abnormality. No adnexal mass.    Stomach/Bowel: Stomach is unremarkable.  Bowel is without evidence of obstruction or inflammation.  Again seen is 3.3 cm soft tissue mass with scattered calcifications at the midline within the mesentery (series 3, image 87), previously measuring 3.3 cm.  Appendix is unremarkable.    Peritoneum: No free fluid. No intraperitoneal free air.    Lymph Nodes: Prominent mesenteric lymph nodes.  Soft tissue mass as above.  Increased size of inguinal lymph nodes for example left inguinal node measuring 0.9 cm in short axis, previously 0.6 cm (series 3, image 165).  Mild fat stranding around the right inguinal lymph nodes (series 3, image 135).    Vasculature: Severe calcific atherosclerosis of the abdominal aorta and its major branches.    Bones: Diffusely sclerotic appearance of the bones compatible with hyperparathyroidism.  Remote fractures of the right superior and inferior pubic rami.  No acute fractures or osseous destructive lesions.    Soft Tissues: No significant abnormality.  Impression: Postoperative change of left nephrectomy without evidence of local recurrent disease.    Stable size of soft tissue mass within the mesentery.    Atrophic right kidney with simple and minimally complex cysts.  Continued attention on follow-up.    Findings compatible with hyperparathyroidism.    Cholelithiasis.    Electronically signed by resident: Pj Pinedo  Date:    02/17/2025  Time:    08:48    Electronically signed by: Otis Lobo  Date:    02/17/2025  Time:    09:46      Assessment:       No diagnosis found.            Plan:        Cancer Staging   Renal cell carcinoma of left kidney  Staging form: Kidney, AJCC 8th Edition  - Pathologic stage from 1/12/2016: Stage Unknown (pT1b(2), pNX, cM0) -  Unsigned  - Clinical stage from 11/22/2016: Stage IV (rcTX, cN0, pM1) - Signed by Liset Ngo NP on 9/13/2023    Pt has had SD for long time on [place], and last scans were largely stable, however, no longer able to tolerate therapy due to comorbidities (HD/ESRD, etc) and LE infection/wounds.  She was most recently enrolled on hospice but was then told she was not allowed to continue dialysis. Long discussion with patient and her accompanying  regarding current clinical status and that she does not qualify for further RCC treatment. They are in agreement and would like see current status of cancer activity, which is not unreasonable.     Scans previously showed slow enlargement of retroperitoneal mass which is not surprising that we are currently holding therapy. No new scans or labs this visit.  Will con't to hold therapy and monitor.      RTC 2 months with CT scan, labs (CBC,CMP), and to see Dr. Rivera.  Appts ONLY on Mon/Wed/Fri (pt has dialysis on Tues and Thursday).      Patient is in agreement with the proposed treatment plan. All questions were answered to the patient's satisfaction. Pt knows to call clinic if anything is needed before the next clinic visit.    I, Dr. Ben Rivera, personally spent 40 mins during this encounter.     Ben Rivera M.D., M.S., F.A.C.P.  Hematology/Oncology Attending  [facility]           Med Onc Route Chart for Scheduling

## 2025-02-19 ENCOUNTER — OFFICE VISIT (OUTPATIENT)
Dept: HEMATOLOGY/ONCOLOGY | Facility: CLINIC | Age: 60
End: 2025-02-19
Payer: MEDICARE

## 2025-02-19 VITALS
DIASTOLIC BLOOD PRESSURE: 74 MMHG | HEART RATE: 59 BPM | RESPIRATION RATE: 17 BRPM | BODY MASS INDEX: 19.05 KG/M2 | SYSTOLIC BLOOD PRESSURE: 116 MMHG | HEIGHT: 64 IN | TEMPERATURE: 98 F

## 2025-02-19 DIAGNOSIS — C64.9 METASTATIC RENAL CELL CARCINOMA TO LUNG, UNSPECIFIED LATERALITY: ICD-10-CM

## 2025-02-19 DIAGNOSIS — C78.00 METASTATIC RENAL CELL CARCINOMA TO LUNG, UNSPECIFIED LATERALITY: ICD-10-CM

## 2025-02-19 DIAGNOSIS — C64.2 METASTATIC RENAL CELL CARCINOMA, LEFT: Primary | ICD-10-CM

## 2025-02-19 PROCEDURE — 99214 OFFICE O/P EST MOD 30 MIN: CPT | Mod: PBBFAC | Performed by: INTERNAL MEDICINE

## 2025-02-19 NOTE — PROGRESS NOTES
"  Subjective:       Patient ID: Eva Bloom is a 59 y.o. female.    Chief Complaint: RCC    HPI     59 y.o.female to clinic for follow up for metastatic RCC. Patient was previously on hospice but now not enrolled as she wants to continue dialysis. She is feeling stronger, remains off cabozantinib. Pt still is on ESRD with HD every TTS. Of note, she is seeing wound care and vascular surgery for non-healing foot wounds. Vascular surgery evaluated further with reports of balloon intervention since our last visit. Denies any new complaints or concerns today.     ECOG 3. She is accompanied with her . She presents in a wheelchair.      Oncologic History (From Chart and Patient):  Eva Bloom is a 59 y.o.female with ESRD on HD who was found to have two L renal masses. She underwent L lap nephrectomy on 1/12/16. 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 Margins were negative.  She healed from surgery well, but then developed a large ovarian mass.  This was removed by gyn along with multiple sites of metastatic disease in the pelvis.  Path was c/w recurrence of RCC.     11/20/16 Pelvic ultrasound reveals "Large heterogeneous cystic and solid mass which appears to arise from the right ovary with worrisome imaging features for malignancy.  Differential diagnosis includes malignant tumors such as serous or mucinous cystadenocarcinoma.  It is important to note that the patient is at risk for ovarian torsion due to the size of the mass.Multiple uterine fibroids are identified with the largest in the uterine fundus."    11/22/16 pathology reveals "FINAL PATHOLOGIC DIAGNOSIS-RIGHT OVARY AND FALLOPIAN TUBE, RIGHT SALPINGO-OOPHORECTOMY:  -Positive for malignancy, high grade carcinoma morphologically and immunohistochemically consistent with metastasis from the patient's known renal cell " "carcinoma"    Review of Systems   Constitutional:  Positive for activity change, appetite change, fatigue and unexpected weight change. Negative for chills and fever.   HENT:  Negative for congestion, hearing loss, mouth sores, postnasal drip, sore throat, tinnitus and voice change.    Eyes:  Negative for pain and visual disturbance.   Respiratory:  Negative for cough, shortness of breath and wheezing.    Cardiovascular:  Negative for chest pain, palpitations and leg swelling.   Gastrointestinal:  Negative for abdominal pain, constipation, diarrhea, nausea and vomiting.   Endocrine: Negative for cold intolerance and heat intolerance.   Genitourinary:  Negative for difficulty urinating, dyspareunia, dysuria, frequency, menstrual problem, urgency, vaginal bleeding, vaginal discharge and vaginal pain.   Musculoskeletal:  Negative for arthralgias and myalgias.        LE ulcerations and pain    Skin:  Negative for color change, rash and wound.   Allergic/Immunologic: Negative for environmental allergies and food allergies.   Neurological:  Negative for weakness, numbness and headaches.   Hematological:  Negative for adenopathy. Does not bruise/bleed easily.   Psychiatric/Behavioral:  Negative for agitation, confusion, hallucinations and sleep disturbance. The patient is not nervous/anxious.    All other systems reviewed and are negative.      Allergies:  Review of patient's allergies indicates:   Allergen Reactions    Allopurinol      Other reaction(s): abnormal transaminases       Medications:  Current Outpatient Medications   Medication Sig Dispense Refill    acetaminophen (TYLENOL) 500 MG tablet Take 1 tablet (500 mg total) by mouth every 4 (four) hours as needed for Pain or Temperature greater than (100.5 or greater). 30 tablet 0    apixaban (ELIQUIS) 2.5 mg Tab Take 1 tablet (2.5 mg total) by mouth 2 (two) times daily. 60 tablet 5    aspirin (ECOTRIN) 81 MG EC tablet Take 1 tablet (81 mg total) by mouth once daily. " 90 tablet 2    atorvastatin (LIPITOR) 40 MG tablet Take 1 tablet (40 mg total) by mouth once daily. 90 tablet 1    levothyroxine (SYNTHROID) 75 MCG tablet Take 1 tablet (75 mcg total) by mouth once daily. 90 tablet 2    lidocaine-prilocaine (EMLA) cream APPLY ATLEAST 30 MINUTES BEFORE TREATMENT 3 TIMES A WEEK 30 g 11    metoprolol succinate (TOPROL-XL) 25 MG 24 hr tablet Take 1 tablet (25 mg total) by mouth once daily. 90 tablet 0    mirtazapine (REMERON) 7.5 MG Tab Take 1 tablet (7.5 mg total) by mouth every evening. 90 tablet 2    naloxone (NARCAN) 1 mg/mL injection 2 mg (1 mg per nostril) by Nasal route as needed for opioid overdose; may repeat in 3 to 5 minutes if not effective. Call 911 2 mL 11     No current facility-administered medications for this visit.     Facility-Administered Medications Ordered in Other Visits   Medication Dose Route Frequency Provider Last Rate Last Admin    0.9%  NaCl infusion   Intravenous Continuous Soni Bhatti MD   Stopped at 01/29/25 1427       PMH:  Past Medical History:   Diagnosis Date    Acute metabolic encephalopathy 01/02/2024    Anemia     Anticoagulant long-term use     Atrial fibrillation     Bronchitis 03/01/2017    Cancer 2016    kidney cancer    CHF (congestive heart failure), NYHA class II, chronic, systolic     CMV (cytomegalovirus) antibody positive     CVA (cerebral vascular accident) 01/03/2024    Encounter for blood transfusion     ESRD (end stage renal disease)     Essential hypertension 09/23/2015    H/O herpes simplex type 2 infection     Herpes simplex type 1 antibody positive     History of kidney cancer     s/p left nephrectomy 1/2016    Hyperparathyroidism, unspecified     Hyperuricemia without signs of inflammatory arthritis and tophaceous disease     Kidney stones     LGSIL (low grade squamous intraepithelial dysplasia)     Myocardiopathy 07/21/2017    Prediabetes     Proteinuria     Renal disorder     Thyroid nodule     Urate nephropathy         PSH:  Past Surgical History:   Procedure Laterality Date    ANGIOGRAPHY OF LOWER EXTREMITY Right 11/11/2024    Procedure: Angiogram Extremity Unilateral;  Surgeon: Corby Casey MD;  [facility];  Service: Vascular;  Laterality: Right;  mGy 63.61      Gycm2   11.8042        Fluro time  13.5min      Contrast amount 40ml    ANGIOPLASTY, PERIPHERAL BLOOD VESSEL Right 11/11/2024    Procedure: ANGIOPLASTY, PERIPHERAL BLOOD VESSEL;  Surgeon: Corby Casey MD;  [facility];  Service: Vascular;  Laterality: Right;    BREAST CYST EXCISION      COLONOSCOPY N/A 11/12/2015    COLONOSCOPY N/A 03/12/2021    Procedure: COLONOSCOPY;  Surgeon: Brendon Lanier MD;  Location: [facility] ENDO;  Service: Endoscopy;  Laterality: N/A;  covid test 3/9, labs prior, prep instr mailed -ml    EXCISION OF ARTERIOVENOUS FISTULA Left 09/27/2023    Procedure: EXCISION, AV FISTULA;  Surgeon: FARIDEH Muñoz III, MD;  Location: [facility] OR [address] [place] [address];  Service: Vascular;  Laterality: Left;  LUE AV graft excision    INSERTION OF BIVENTRICULAR IMPLANTABLE CARDIOVERTER-DEFIBRILLATOR (ICD)  04/2021    INSERTION OF TUNNELED CENTRAL VENOUS CATHETER (CVC) WITH SUBCUTANEOUS PORT N/A 01/25/2023    Procedure: INSERTION, DUAL LUMEN CATHETER WITH PORT, WITH IMAGING GUIDANCE;  Surgeon: FARIDEH Muñoz III, MD;  Location: [facility] OR [address] [place] [address];  Service: Peripheral Vascular;  Laterality: N/A;  possinble permcath placment    NEPHRECTOMY-LAPAROSCOPIC Left 01/12/2016    PERCUTANEOUS TRANSLUMINAL ANGIOPLASTY OF ARTERIOVENOUS FISTULA Left 04/19/2023    Procedure: PTA, AV FISTULA;  Surgeon: FARIDEH Muñoz III, MD;  Location: [facility] CATH LAB;  Service: Peripheral Vascular;  Laterality: Left;    PERITONEAL CATHETER INSERTION      Permacath insertion  01/12/2017    PLACEMENT OF ARTERIOVENOUS GRAFT  12/28/2022    Procedure: INSERTION, GRAFT, ARTERIOVENOUS;  Surgeon: FARIDEH Muñoz III, MD;  Location: [facility] OR [address] [place] [address];  Service: Peripheral  Vascular;;    REMOVAL, GRAFT, ARTERIOVENOUS, UPPER EXTREMITY Left 01/25/2023    Procedure: REMOVAL, GRAFT, ARTERIOVENOUS, UPPER EXTREMITY;  Surgeon: FARIDEH Muñoz III, MD;  [facility];  Service: Peripheral Vascular;  Laterality: Left;    REVISION OF ARTERIOVENOUS FISTULA Left 05/01/2023    Procedure: REVISION, AV FISTULA;  Surgeon: FARIDEH Muñoz III, MD;  [facility];  Service: Peripheral Vascular;  Laterality: Left;  LUE AVG revision    REVISION OF PROCEDURE INVOLVING ARTERIOVENOUS GRAFT Left 12/28/2022    Procedure: REVISION, PROCEDURE INVOLVING ARTERIOVENOUS GRAFT;  Surgeon: FARIDEH Muñoz III, MD;  [facility];  Service: Peripheral Vascular;  Laterality: Left;    REVISION OF PROCEDURE INVOLVING ARTERIOVENOUS GRAFT Left 07/21/2023    Procedure: LEFT ARM ARTERIOVENOUS GRAFT EXCISION  AND LIGATION;  Surgeon: Obi Werner MD;  Location: [facility];  Service: Vascular;  Laterality: Left;  Left AVG excision and revision with interposition    SALPINGOOPHORECTOMY Right 2016    KJB---DAVINCI    TONSILLECTOMY      TUBAL LIGATION         FamHx:  Family History   Problem Relation Name Age of Onset    Hypertension Mother      Diabetes Father      Kidney disease Father      No Known Problems Sister Sophy     Heart disease Sister Maria A     No Known Problems Sister Claudine     No Known Problems Brother Dax     No Known Problems Brother Don     Cataracts Maternal Aunt      No Known Problems Maternal Uncle      No Known Problems Paternal Aunt      No Known Problems Paternal Uncle      No Known Problems Maternal Grandmother      Diabetes Maternal Grandfather      No Known Problems Paternal Grandmother      No Known Problems Paternal Grandfather      No Known Problems Son      No Known Problems Son      No Known Problems Other      Breast cancer Neg Hx      Colon cancer Neg Hx      Cancer Neg Hx      Stroke Neg Hx      Amblyopia Neg Hx      Blindness Neg Hx      Glaucoma  Neg Hx      Macular degeneration Neg Hx      Retinal detachment Neg Hx      Strabismus Neg Hx      Thyroid disease Neg Hx         SocHx:  Social History     Socioeconomic History    Marital status:     Number of children: 2   Occupational History    Occupation: lucero     Employer: [id number]   Tobacco Use    Smoking status: Never     Passive exposure: Never    Smokeless tobacco: Never   Substance and Sexual Activity    Alcohol use: No    Drug use: Not Currently     Types: Hydrocodone    Sexual activity: Not Currently     Social Drivers of Health     Financial Resource Strain: Low Risk  (9/28/2023)    Overall Financial Resource Strain (CARDIA)     Difficulty of Paying Living Expenses: Not hard at all   Food Insecurity: No Food Insecurity (9/28/2023)    Hunger Vital Sign     Worried About Running Out of Food in the Last Year: Never true     Ran Out of Food in the Last Year: Never true   Transportation Needs: No Transportation Needs (9/28/2023)    PRAPARE - Transportation     Lack of Transportation (Medical): No     Lack of Transportation (Non-Medical): No   Physical Activity: Inactive (9/28/2023)    Exercise Vital Sign     Days of Exercise per Week: 0 days     Minutes of Exercise per Session: 0 min   Stress: No Stress Concern Present (9/28/2023)    [employer] [place] [employer] - Occupational Stress Questionnaire     Feeling of Stress : Not at all   Housing Stability: Low Risk  (9/28/2023)    Housing Stability Vital Sign     Unable to Pay for Housing in the Last Year: No     Number of Places Lived in the Last Year: 1     Unstable Housing in the Last Year: No         Objective:     Vitals:    02/19/25 1054   BP: 116/74   Pulse: (!) 59   Resp: 17   Temp: 97.5 °F (36.4 °C)           Physical Exam  Vitals and nursing note reviewed.   Constitutional:       General: She is not in acute distress.     Appearance: She is well-developed.      Comments: Thin appearance, in wheelchair   HENT:       Head: Normocephalic and atraumatic.      Nose: Nose normal.      Mouth/Throat:      Pharynx: No oropharyngeal exudate.   Eyes:      General:         Right eye: No discharge.         Left eye: No discharge.      Conjunctiva/sclera: Conjunctivae normal.      Pupils: Pupils are equal, round, and reactive to light.   Neck:      Trachea: No tracheal deviation.   Cardiovascular:      Comments: No swelling to bilateral lower extremities.   Musculoskeletal:         General: No tenderness. Normal range of motion.      Comments:      Skin:     General: Skin is warm and dry.      Coloration: Skin is not pale.      Findings: No erythema or rash.      Comments: Ulcerations on LE   Neurological:      Mental Status: She is alert and oriented to person, place, and time.   Psychiatric:         Behavior: Behavior normal.         Thought Content: Thought content normal.        LABS:  WBC   Date Value Ref Range Status   02/15/2025 4.74 3.90 - 12.70 K/uL Final     Hemoglobin   Date Value Ref Range Status   02/15/2025 13.6 12.0 - 16.0 g/dL Final     POC Hematocrit   Date Value Ref Range Status   10/25/2024 41 36 - 54 %PCV Final     Hematocrit   Date Value Ref Range Status   02/15/2025 44.8 37.0 - 48.5 % Final     Platelets   Date Value Ref Range Status   02/15/2025 212 150 - 450 K/uL Final       Chemistry        Component Value Date/Time     02/15/2025 0847    K 3.8 02/15/2025 0847    CL 95 02/15/2025 0847    CO2 26 02/15/2025 0847    BUN 26 (H) 02/15/2025 0847    CREATININE 3.7 (H) 02/15/2025 0847    GLU 74 02/15/2025 0847        Component Value Date/Time    CALCIUM 9.9 02/15/2025 0847    ALKPHOS 780 (H) 02/15/2025 0847    AST 35 02/15/2025 0847    ALT 20 02/15/2025 0847    BILITOT 1.0 02/15/2025 0847        CT Chest Abdomen Pelvis Without Contrast (XPD)  Narrative: EXAMINATION:  CT CHEST ABDOMEN PELVIS WITHOUT CONTRAST(XPD)    CLINICAL HISTORY:  Kidney cancer, recurrence; Malignant neoplasm of left kidney, except renal  pelvis    TECHNIQUE:  Low dose axial images, sagittal and coronal reformations were obtained from the thoracic inlet to the pubic symphysis without the administration of contrast.    COMPARISON:  CT chest 09/16/2024    FINDINGS:  Evaluation of solid organs is suboptimal due to the absence of intravenous contrast.    CHEST:    Neck and thoracic soft tissues: Partially calcified left axillary pseudoaneurysms are seen.  Left-sided AICD.    Vasculature: Left sided aortic arch. Thoracic aorta is nonaneurysmal.  Severe calcific atherosclerosis of the thoracic aorta . Enlargement of pulmonary artery.    Heart: Cardiomegaly.  Severe calcific atherosclerosis of the coronary arteries. Multiple valvular calcifications are seen.  Small pericardial effusion.    Airways: Central airways are clear.    Lungs/Pleura: Stable cluster of calcified nodules within the anterior right upper lobe (series 6, image 85).  Bilateral dependent atelectasis.  Few additional nodules for example 0.5 cm right lower lobe nodule (series 6, image 253) and right lower lobe measuring 5 mm on image 215 of series 6.  These remain without change.   No evidence of consolidation, mass, significant pleural thickening, or pleural fluid.    Hilum/Mediastinum: No hilar or mediastinal lymph node enlargement.    Esophagus: No significant abnormality.    ABDOMEN/PELVIS:    Liver: The liver surface appears nodular suggestive of hepatic cirrhosis.  Solid masses can be neither adequately evaluated nor definitively excluded in the absence of intravenous contrast.    Gallbladder: Calcified gallstones are present.  There calcifications along the gallbladder wall which could represent sequela of chronic inflammation versus adherent gallstones.  No pericholecystic fluid or fat stranding.    Bile ducts: No intrahepatic or extrahepatic biliary ductal dilatation.    Spleen: Normal size. Nonspecific hypodensity in the superior spleen likely benign.    Pancreas: No peripancreatic  fat stranding.    Adrenals: No significant abnormality    Renal/Ureters: Left kidney is surgically absent.  No evidence of residual the mass in the left renal bed..  There is atrophy of the right kidney.  Renovascular calcifications results in suboptimal evaluation for stones.  Multiple simple and minimally complex lesions are seen in the right kidney.   No hydroureteronephrosis. The urinary bladder is unremarkable.    Reproductive: Uterus is without significant abnormality. No adnexal mass.    Stomach/Bowel: Stomach is unremarkable.  Bowel is without evidence of obstruction or inflammation.  Again seen is 3.3 cm soft tissue mass with scattered calcifications at the midline within the mesentery (series 3, image 87), previously measuring 3.3 cm.  Appendix is unremarkable.    Peritoneum: No free fluid. No intraperitoneal free air.    Lymph Nodes: Prominent mesenteric lymph nodes.  Soft tissue mass as above.  Increased size of inguinal lymph nodes for example left inguinal node measuring 0.9 cm in short axis, previously 0.6 cm (series 3, image 165).  Mild fat stranding around the right inguinal lymph nodes (series 3, image 135).    Vasculature: Severe calcific atherosclerosis of the abdominal aorta and its major branches.    Bones: Diffusely sclerotic appearance of the bones compatible with hyperparathyroidism.  Remote fractures of the right superior and inferior pubic rami.  No acute fractures or osseous destructive lesions.    Soft Tissues: No significant abnormality.  Impression: Postoperative change of left nephrectomy without evidence of local recurrent disease.    Stable size of soft tissue mass within the mesentery.    Atrophic right kidney with simple and minimally complex cysts.  Continued attention on follow-up.    Findings compatible with hyperparathyroidism.    Cholelithiasis.    Electronically signed by resident: Pj Pinedo  Date:    02/17/2025  Time:    08:48    Electronically signed by: Otis  Yamil  Date:    02/17/2025  Time:    09:46      Assessment:       1. Metastatic renal cell carcinoma, left    2. Metastatic renal cell carcinoma to lung, unspecified laterality            Plan:        Cancer Staging   Renal cell carcinoma of left kidney  Staging form: Kidney, AJCC 8th Edition  - Pathologic stage from 1/12/2016: Stage Unknown (pT1b(2), pNX, cM0) - Unsigned  - Clinical stage from 11/22/2016: Stage IV (rcTX, cN0, pM1) - Signed by Liset Ngo NP on 9/13/2023    Pt has had SD for long time on [place], and last scans were largely stable, however, no longer able to tolerate therapy due to comorbidities (HD/ESRD, etc) and LE infection/wounds.  She was most recently enrolled on hospice but was then told she was not allowed to continue dialysis. Long discussion with patient and her accompanying  regarding current clinical status and that she does not qualify for further RCC treatment. They are in agreement and would like see current status of cancer activity, which is not unreasonable.     Scans previously showed slow enlargement of retroperitoneal mass which is not surprising that we are currently holding therapy. Updated scans show stable mesenteric mass and some mildly enlarged pelvic nodes which could be reactive from vascular ischemia and foot lesions. Educated patient and family regarding very limited treatment options given above.     RTC 4 months with CT scan, labs (CBC,CMP), and to see Dr. Rivera.  Appts ONLY on Mon/Wed/Fri (pt has dialysis on Tues and Thursday).      Patient is in agreement with the proposed treatment plan. All questions were answered to the patient's satisfaction. Pt knows to call clinic if anything is needed before the next clinic visit.    I, Dr. Ben Rivera, personally spent 40 mins during this encounter.     Ben Rivera M.D., M.S., F.A.C.P.  Hematology/Oncology Attending  [facility]             Med Onc Chart Routing      Follow up with  physician 4 months. 4 months, Dr. Rivera on a wednesday only   Follow up with [facility]    Infusion scheduling note    Injection scheduling note    Labs CBC and CMP   Scheduling:  Preferred lab:  Lab interval:  CBC, CMP prior to clinic visit   Imaging CT chest abdomen pelvis   CT C/A/P in 4 months, a few days prior to clinic visit   Pharmacy appointment    Other referrals

## 2025-02-20 DIAGNOSIS — C64.2 RENAL CELL CARCINOMA OF LEFT KIDNEY: Primary | ICD-10-CM

## 2025-02-21 LAB
LEFT ABI: 2.14
LEFT ANT TIBIAL SYS PSV: 92 CM/S
LEFT CFA PSV: 57 CM/S
LEFT DORSALIS PEDIS: 255 MMHG
LEFT EXTERNAL ILIAC PSV: 45 CM/S
LEFT PERONEAL SYS PSV: 0 CM/S
LEFT POPLITEAL PSV: 47 CM/S
LEFT POST TIBIAL SYS PSV: 40 CM/S
LEFT POSTERIOR TIBIAL: 42 MMHG
LEFT PROFUNDA SYS PSV: 39 CM/S
LEFT SUPER FEMORAL DIST SYS PSV: 40 CM/S
LEFT SUPER FEMORAL MID SYS PSV: 64 CM/S
LEFT SUPER FEMORAL OSTIAL SYS PSV: 57 CM/S
LEFT SUPER FEMORAL PROX SYS PSV: 76 CM/S
LEFT TBI: 0
LEFT TIB/PER TRUNK SYS PSV: 71 CM/S
LEFT TOE PRESSURE: 0 MMHG
OHS CV LEFT LOWER EXTREMITY ABI (NO CALC): 0.35
OHS CV RIGHT ABI LOWER EXTREMITY (NO CALC): 0.24
RIGHT ABI: 2.14
RIGHT ANT TIBIAL SYS PSV: 81 CM/S
RIGHT ARM BP: 119 MMHG
RIGHT CFA PSV: 81 CM/S
RIGHT DORSALIS PEDIS: 255 MMHG
RIGHT EXTERNAL ILLIAC PSV: 46 CM/S
RIGHT PERONEAL SYS PSV: 18 CM/S
RIGHT POPLITEAL PSV: 48 CM/S
RIGHT POST TIBIAL SYS PSV: 32 CM/S
RIGHT POSTERIOR TIBIAL: 29 MMHG
RIGHT PROFUNDA SYS PSV: 33 CM/S
RIGHT SUPER FEMORAL DIST SYS PSV: 81 CM/S
RIGHT SUPER FEMORAL MID SYS PSV: 57 CM/S
RIGHT SUPER FEMORAL OSTIAL SYS PSV: 65 CM/S
RIGHT SUPER FEMORAL PROX SYS PSV: 87 CM/S
RIGHT TBI: 0
RIGHT TIB/PER TRUNK SYS PSV: 38 CM/S
RIGHT TOE PRESSURE: 0 MMHG

## 2025-02-25 ENCOUNTER — OFFICE VISIT (OUTPATIENT)
Dept: VASCULAR SURGERY | Facility: CLINIC | Age: 60
End: 2025-02-25
Payer: MEDICARE

## 2025-02-25 VITALS
DIASTOLIC BLOOD PRESSURE: 77 MMHG | SYSTOLIC BLOOD PRESSURE: 117 MMHG | BODY MASS INDEX: 19.29 KG/M2 | HEIGHT: 64 IN | HEART RATE: 71 BPM | WEIGHT: 113 LBS

## 2025-02-25 DIAGNOSIS — I70.223 CRITICAL LIMB ISCHEMIA OF BOTH LOWER EXTREMITIES: ICD-10-CM

## 2025-02-25 DIAGNOSIS — Z99.2 ESRD (END STAGE RENAL DISEASE) ON DIALYSIS: Primary | ICD-10-CM

## 2025-02-25 DIAGNOSIS — N18.6 ESRD (END STAGE RENAL DISEASE) ON DIALYSIS: Primary | ICD-10-CM

## 2025-02-25 DIAGNOSIS — L97.429 HEEL ULCER, LEFT, WITH UNSPECIFIED SEVERITY: ICD-10-CM

## 2025-02-25 DIAGNOSIS — L97.429 HEEL ULCER, LEFT, WITH UNSPECIFIED SEVERITY: Primary | ICD-10-CM

## 2025-02-25 PROCEDURE — 99999 PR PBB SHADOW E&M-EST. PATIENT-LVL III: CPT | Mod: PBBFAC,,, | Performed by: SURGERY

## 2025-02-25 PROCEDURE — 99213 OFFICE O/P EST LOW 20 MIN: CPT | Mod: PBBFAC | Performed by: SURGERY

## 2025-02-25 NOTE — PROGRESS NOTES
VASCULAR SURGERY SERVICE    CHIEF COMPLAINT:  Functional left AV graft dysfunctional left AV graft    HISTORY OF PRESENT ILLNESS: Eva Bloom is a 59 y.o. female end-stage renal disease, with multiple serious medical comorbidities including admission for sepsis 2 months ago, EF 20%, AFib on Eliquis, who is having increasing bleeding after her dialysis runs.  I placed a revision, left AV graft with long interposition Accu Seal 12/28/2022.  She has had uninterrupted use of this graft ever since.    Recent weeks she is had increased bleeding after dialysis sticks    S/p:    1aaa.  Excision, left AV graft for infection 9/27/2023 (Toni) (07/21/2023, Dr. Werner)  1aa. Reclosure, L AVG conter-incision 5/1/2023  1a. PTA, L AVG 4/19/2023  Excision, excluded L AVG and permacath 1/25/2023  revision, left AV graft with long interposition Accu Seal 12/28/2022  Original left AV graft 2017 with subsequent covered stent placement in venous outflow    09/1/2023:  She now returns after excision of the left AV graft for infection proximally 5 weeks ago.  She is been dialyzing through a PermCath.    09/22/2023:  Now returns with breakdown of prior counter incision for graft excision.  Notably, she has metastatic renal cell carcinoma to the lungs    10/20/2023:  She now returns after excision of the left AV graft remnant.  She is continued to deteriorate with weight loss, now BMI 13, was recently hospitalized for her declining state, has now been put on hospice.  This point she is still dialyzing through a PermCath    2/2024:  referred by Dr. Farooq for L 3rd toe wound.  Pt has resumed Oncologic care after stopping hospice care and desires to proceed with ulcer healing treatment.    4/2024:  +wound care with healing wounds.    7/2024:  stronger, wounds improving, HD via catheter without issues.    9/2024:  walking with walker, HD going well.  Wound care without signs of infection.    12/2024: s/p R AT PTA 11/11/24.  Cont  to have R toe pain, seeing Podiatry for wound care.      2/2025: s/p Balloon angioplasty of the left AT with 2x220 Ultraverse balloon 1/29/25.    Past Medical History:   Diagnosis Date    Acute metabolic encephalopathy 01/02/2024    Anemia     Anticoagulant long-term use     Atrial fibrillation     Bronchitis 03/01/2017    Cancer 2016    kidney cancer    CHF (congestive heart failure), NYHA class II, chronic, systolic     CMV (cytomegalovirus) antibody positive     CVA (cerebral vascular accident) 01/03/2024    Encounter for blood transfusion     ESRD (end stage renal disease)     Essential hypertension 09/23/2015    H/O herpes simplex type 2 infection     Herpes simplex type 1 antibody positive     History of kidney cancer     s/p left nephrectomy 1/2016    Hyperparathyroidism, unspecified     Hyperuricemia without signs of inflammatory arthritis and tophaceous disease     Kidney stones     LGSIL (low grade squamous intraepithelial dysplasia)     Myocardiopathy 07/21/2017    Prediabetes     Proteinuria     Renal disorder     Thyroid nodule     Urate nephropathy        Past Surgical History:   Procedure Laterality Date    ANGIOGRAPHY OF LOWER EXTREMITY Right 11/11/2024    Procedure: Angiogram Extremity Unilateral;  Surgeon: Corby Casey MD;  Location: [facility] OR [address] [place] [address];  Service: Vascular;  Laterality: Right;  mGy 63.61      Gycm2   11.8042        Fluro time  13.5min      Contrast amount 40ml    ANGIOPLASTY, PERIPHERAL BLOOD VESSEL Right 11/11/2024    Procedure: ANGIOPLASTY, PERIPHERAL BLOOD VESSEL;  Surgeon: Corby Casey MD;  Location: [address];  Service: Vascular;  Laterality: Right;    BREAST CYST EXCISION      COLONOSCOPY N/A 11/12/2015    COLONOSCOPY N/A 03/12/2021    Procedure: COLONOSCOPY;  Surgeon: Brendon Lanier MD;  Location: [facility];  Service: Endoscopy;  Laterality: N/A;  covid test 3/9, labs prior, prep instr mailed -ml    EXCISION OF ARTERIOVENOUS FISTULA Left 09/27/2023     Procedure: EXCISION, AV FISTULA;  Surgeon: FARIDEH Muñoz III, MD;  Location: [facility] OR 2ND FLR;  Service: Vascular;  Laterality: Left;  LUE AV graft excision    INSERTION OF BIVENTRICULAR IMPLANTABLE CARDIOVERTER-DEFIBRILLATOR (ICD)  04/2021    INSERTION OF TUNNELED CENTRAL VENOUS CATHETER (CVC) WITH SUBCUTANEOUS PORT N/A 01/25/2023    Procedure: INSERTION, DUAL LUMEN CATHETER WITH PORT, WITH IMAGING GUIDANCE;  Surgeon: FARIDEH Muñoz III, MD;  Location: [facility] OR 2ND FLR;  Service: Peripheral Vascular;  Laterality: N/A;  possinble permcath placment    NEPHRECTOMY-LAPAROSCOPIC Left 01/12/2016    PERCUTANEOUS TRANSLUMINAL ANGIOPLASTY OF ARTERIOVENOUS FISTULA Left 04/19/2023    Procedure: PTA, AV FISTULA;  Surgeon: FARIDEH Muñoz III, MD;  Location: [facility] CATH LAB;  Service: Peripheral Vascular;  Laterality: Left;    PERITONEAL CATHETER INSERTION      Permacath insertion  01/12/2017    PLACEMENT OF ARTERIOVENOUS GRAFT  12/28/2022    Procedure: INSERTION, GRAFT, ARTERIOVENOUS;  Surgeon: FARIDEH Muñoz III, MD;  Location: [facility] OR 2ND FLR;  Service: Peripheral Vascular;;    REMOVAL, GRAFT, ARTERIOVENOUS, UPPER EXTREMITY Left 01/25/2023    Procedure: REMOVAL, GRAFT, ARTERIOVENOUS, UPPER EXTREMITY;  Surgeon: FARIDEH Muñoz III, MD;  Location: [facility] OR 2ND FLR;  Service: Peripheral Vascular;  Laterality: Left;    REVISION OF ARTERIOVENOUS FISTULA Left 05/01/2023    Procedure: REVISION, AV FISTULA;  Surgeon: FARIDEH Muñoz III, MD;  Location: [facility] OR 2ND FLR;  Service: Peripheral Vascular;  Laterality: Left;  LUE AVG revision    REVISION OF PROCEDURE INVOLVING ARTERIOVENOUS GRAFT Left 12/28/2022    Procedure: REVISION, PROCEDURE INVOLVING ARTERIOVENOUS GRAFT;  Surgeon: FARIDEH Muñoz III, MD;  Location: [facility] OR 2ND FLR;  Service: Peripheral Vascular;  Laterality: Left;    REVISION OF PROCEDURE INVOLVING ARTERIOVENOUS GRAFT Left 07/21/2023    Procedure: LEFT ARM ARTERIOVENOUS GRAFT EXCISION  AND LIGATION;  Surgeon:  Obi Werner MD;  Location: [facility] OR;  Service: Vascular;  Laterality: Left;  Left AVG excision and revision with interposition    SALPINGOOPHORECTOMY Right 2016    KJB---DAVINCI    TONSILLECTOMY      TUBAL LIGATION           Current Outpatient Medications:     acetaminophen (TYLENOL) 500 MG tablet, Take 1 tablet (500 mg total) by mouth every 4 (four) hours as needed for Pain or Temperature greater than (100.5 or greater)., Disp: 30 tablet, Rfl: 0    apixaban (ELIQUIS) 2.5 mg Tab, Take 1 tablet (2.5 mg total) by mouth 2 (two) times daily., Disp: 60 tablet, Rfl: 5    aspirin (ECOTRIN) 81 MG EC tablet, Take 1 tablet (81 mg total) by mouth once daily., Disp: 90 tablet, Rfl: 2    atorvastatin (LIPITOR) 40 MG tablet, Take 1 tablet (40 mg total) by mouth once daily., Disp: 90 tablet, Rfl: 1    levothyroxine (SYNTHROID) 75 MCG tablet, Take 1 tablet (75 mcg total) by mouth once daily., Disp: 90 tablet, Rfl: 2    lidocaine-prilocaine (EMLA) cream, APPLY ATLEAST 30 MINUTES BEFORE TREATMENT 3 TIMES A WEEK, Disp: 30 g, Rfl: 11    metoprolol succinate (TOPROL-XL) 25 MG 24 hr tablet, Take 1 tablet (25 mg total) by mouth once daily., Disp: 90 tablet, Rfl: 0    mirtazapine (REMERON) 7.5 MG Tab, Take 1 tablet (7.5 mg total) by mouth every evening., Disp: 90 tablet, Rfl: 2    naloxone (NARCAN) 1 mg/mL injection, 2 mg (1 mg per nostril) by Nasal route as needed for opioid overdose; may repeat in 3 to 5 minutes if not effective. Call 911, Disp: 2 mL, Rfl: 11  No current facility-administered medications for this visit.    Facility-Administered Medications Ordered in Other Visits:     0.9%  NaCl infusion, , Intravenous, Continuous, Soni Bhatti MD, Stopped at 01/29/25 2646    Review of patient's allergies indicates:   Allergen Reactions    Carvedilol Other (See Comments)     Nausea/vomiting    Allopurinol      Other reaction(s): abnormal transaminases       Family History   Problem Relation Name Age of Onset     "Hypertension Mother      Diabetes Father      Kidney disease Father      No Known Problems Sister Sophy     Heart disease Sister Maria A     No Known Problems Sister Claudine     No Known Problems Brother Dax     No Known Problems Brother Don     Cataracts Maternal Aunt      No Known Problems Maternal Uncle      No Known Problems Paternal Aunt      No Known Problems Paternal Uncle      No Known Problems Maternal Grandmother      Diabetes Maternal Grandfather      No Known Problems Paternal Grandmother      No Known Problems Paternal Grandfather      No Known Problems Son      No Known Problems Son      No Known Problems Other      Breast cancer Neg Hx      Colon cancer Neg Hx      Cancer Neg Hx      Stroke Neg Hx      Amblyopia Neg Hx      Blindness Neg Hx      Glaucoma Neg Hx      Macular degeneration Neg Hx      Retinal detachment Neg Hx      Strabismus Neg Hx      Thyroid disease Neg Hx         Social History     Tobacco Use    Smoking status: Never     Passive exposure: Never    Smokeless tobacco: Never   Substance Use Topics    Alcohol use: No    Drug use: Not Currently     Types: Hydrocodone       PHYSICAL EXAM:   /77 (BP Location: Right arm, Patient Position: Sitting)   Pulse 71   Ht 5' 4" (1.626 m)   Wt 51.3 kg (113 lb)   LMP 10/24/2016   BMI 19.40 kg/m²   Constitutional:  She appears very frail and deconditioned   Neurological: Normal speech  no focal findings  CN II - XII grossly intact.    Psychiatric: Mood and affect appropriate and symmetric.   HEENT: Normocephalic / atraumatic  PERRLA  Midline trachea  No scars across the neck   Cardiac: Regular rate and rhythm.   Pulmonary: Normal pulmonary effort.   Abdomen: Soft, not distended.     Skin: Warm and well perfused.    Vascular:  Radial pulses are nonpalpable bilaterally.   Extremities/  Musculoskeletal: No edema.   Right arm inc well healed, bilateral toes and L heel with edema and dry ischemic changes       IMAGIN/2024  Right lower " extremity pressures and waveforms indicate moderate to severe arterial occlusive disease.  Left lower extremity pressures and waveforms indicate moderate to severe arterial occlusive disease.  Ankle pressures are non-compressible indicating possible medial calcinosis.     Left lower extremity arterial ultrasound shows decreased external iliac artery flow indicating proximal stenosis with decreased distal femoropopliteal artery flow, heavily calcified arteries, an occluded PT and peroneal arteries.     IMPRESSION:   Status post excision, left AV graft remnant.    Patient's medical status continued to deteriorate and now off home hospice  AFib on Eliquis  Severe systolic dysfunction EF 20%  Metastatic renal cell to lung    12/2024  Right lower extremity pressures and waveforms indicate moderate arterial occlusive disease.  Left lower extremity pressures and waveforms indicate moderate arterial occlusive disease.  Ankle pressures are non-compressible indicating possible medial calcinosis.     Right lower extremity arterial ultrasound shows decreased flow in the external iliac artery and distally.  Left lower extremity arterial ultrasound shows 50% AT stenosis with an occluded PT artery.  There is decreased flow in the external iliac artery and distally.  Ankle pressures are non-compressible indicating possible medial calcinosis.     2/2025  Right lower extremity arterial ultrasound shows decreased profunda femoris, TP trunk, PT and peroneal flow.   Left lower extremity arterial ultrasound shows decreased external iliac artery flow, profunda femoris, distal SFA, PT artery flow with an occluded peroneal artery.     Right lower extremity pressures and waveforms indicate moderate to severe arterial occlusive disease.  Left lower extremity pressures and waveforms indicate moderate to severe arterial occlusive disease.                PLAN  -NUVIA / TP show moderate to severe arterial occlusive disease s/p L AT/PT PTA 7/2023  with improved waveforms - cont with Podiatry mgmt  -Wound care recs  -s/p R AT PTA 11/11/24  -s/p Balloon angioplasty of the left AT with 2x220 Ultraverse balloon 1/2025   -wound care referral  -RTC 3 mo with BLE arterial US and NUVIA/TP    I spent 11 minutes evaluating this patient and greater than 50% of the time was spent counseling, coordinator care and discussing the plan of care.  All questions were answered and patient stated understanding with agreement with the above treatment plan.    Corby Casey M.D., R.P.V.CHRISTIANO  [facility]

## 2025-02-26 ENCOUNTER — HOSPITAL ENCOUNTER (OUTPATIENT)
Dept: RADIOLOGY | Facility: HOSPITAL | Age: 60
Discharge: HOME | End: 2025-02-26
Attending: INTERNAL MEDICINE
Payer: MEDICARE

## 2025-02-26 DIAGNOSIS — R92.8 ABNORMAL MAMMOGRAM OF LEFT BREAST: ICD-10-CM

## 2025-02-26 PROCEDURE — 77061 BREAST TOMOSYNTHESIS UNI: CPT | Mod: 26,LT,, | Performed by: RADIOLOGY

## 2025-02-26 PROCEDURE — 77065 DX MAMMO INCL CAD UNI: CPT | Mod: 26,LT,, | Performed by: RADIOLOGY

## 2025-02-26 PROCEDURE — 77065 DX MAMMO INCL CAD UNI: CPT | Mod: TC,LT

## 2025-02-26 PROCEDURE — 76642 ULTRASOUND BREAST LIMITED: CPT | Mod: TC,LT

## 2025-02-26 PROCEDURE — 76642 ULTRASOUND BREAST LIMITED: CPT | Mod: 26,GW,LT, | Performed by: RADIOLOGY

## 2025-03-12 ENCOUNTER — TELEPHONE (OUTPATIENT)
Dept: VASCULAR SURGERY | Facility: CLINIC | Age: 60
End: 2025-03-12
Payer: COMMERCIAL

## 2025-03-12 NOTE — TELEPHONE ENCOUNTER
----- Message from Hope Turner sent at 3/12/2025 12:46 PM CDT -----  Regarding: Patient Advice  Name of Who is Calling: Patient's  Who Left The Message: Patient's  What is the request in detail:      Patient's  called requesting a call in reference to his Wife's wound care.    Please further advise.     Thank youReply by MY [facility]: [facility]  Preferred Call Back :  [phone] (S)

## 2025-03-12 NOTE — TELEPHONE ENCOUNTER
Called and spoke with pt.   states pt.saw  on 2/25/2025 and referral for wound care placed.  called today and said he has not neard from anyone.  Message sent to dept. to reach out to pt.and message sent to provider.

## 2025-03-17 ENCOUNTER — HOSPITAL ENCOUNTER (OUTPATIENT)
Dept: WOUND CARE | Facility: HOSPITAL | Age: 60
Discharge: HOME | End: 2025-03-17
Attending: INTERNAL MEDICINE
Payer: COMMERCIAL

## 2025-03-17 VITALS
TEMPERATURE: 98 F | DIASTOLIC BLOOD PRESSURE: 75 MMHG | HEART RATE: 64 BPM | RESPIRATION RATE: 16 BRPM | SYSTOLIC BLOOD PRESSURE: 122 MMHG

## 2025-03-17 DIAGNOSIS — C78.00 SECONDARY RENAL CELL CARCINOMA OF LUNG, UNSPECIFIED LATERALITY: ICD-10-CM

## 2025-03-17 DIAGNOSIS — I70.223 CRITICAL LIMB ISCHEMIA OF BOTH LOWER EXTREMITIES: ICD-10-CM

## 2025-03-17 DIAGNOSIS — L97.429 HEEL ULCER, LEFT, WITH UNSPECIFIED SEVERITY: ICD-10-CM

## 2025-03-17 DIAGNOSIS — N18.6 ESRD (END STAGE RENAL DISEASE) ON DIALYSIS: ICD-10-CM

## 2025-03-17 DIAGNOSIS — Z99.2 ESRD (END STAGE RENAL DISEASE) ON DIALYSIS: ICD-10-CM

## 2025-03-17 DIAGNOSIS — I50.42 CHRONIC COMBINED SYSTOLIC AND DIASTOLIC HEART FAILURE: ICD-10-CM

## 2025-03-17 DIAGNOSIS — I73.9 PAD (PERIPHERAL ARTERY DISEASE): ICD-10-CM

## 2025-03-17 DIAGNOSIS — I96 DRY GANGRENE: Primary | ICD-10-CM

## 2025-03-17 DIAGNOSIS — C64.9 SECONDARY RENAL CELL CARCINOMA OF LUNG, UNSPECIFIED LATERALITY: ICD-10-CM

## 2025-03-17 PROCEDURE — 99214 OFFICE O/P EST MOD 30 MIN: CPT | Mod: 25,GW,, | Performed by: INTERNAL MEDICINE

## 2025-03-17 PROCEDURE — 11042 DBRDMT SUBQ TIS 1ST 20SQCM/<: CPT | Mod: GW,,, | Performed by: INTERNAL MEDICINE

## 2025-03-17 PROCEDURE — 11042 DBRDMT SUBQ TIS 1ST 20SQCM/<: CPT | Performed by: INTERNAL MEDICINE

## 2025-03-17 NOTE — PROGRESS NOTES
[facility]                Progress Note    Subjective:       Patient ID: Eva Bloom is a 59 y.o. female.    Chief Complaint: Wound Check    Readmit wound care visit for right and left foot wounds. Patient last seen in clinic in Dec. 2024. Patient continues with dialysis three times a week on M/W/F. Patient with port to left upper chest wall with clean dry dressing noted.  Patient presented via wheelchair pushed by   to [facility] with nurse at side. Patient denies fever, chills, nausea, vomiting or diarrhea at present. Patient c/o  pain to wound beds 5/10 at present. Patient reports Home Health visits once a week for wound care. Patient reports not having any issues with dressing to left heel wound. Dressing appears without strike through drainage. Dressing removed & wound cleaned by nurse. Left heel pink/red/maroon in color, and moist. Right 1st -2nd toes and left 1st -3 rd  toes dry eschar. Dressing  was discarded. No dressing to toes on right and left foot as previously ordered by MD. Patient applies betadine to toes and leave open to air. Topical Lidocaine 5% ointment applied to left heel wound bed and allowed to absorb for 15 minutes for patient comfort. Changes made to dressing order; applied per MD orders. Patient will return to [facility] in 3 weeks. HH will follow up 2 times a week when not in clinic. AVS printed and given to patient.    Follow up ischemic wounds, patient known to me with HFrEF metastatic RCC (no longer on systemic treatment) ESRD on HD (TRS) presents with  for follow up bilateral feet wounds. Has dry gangrene of bilateral second and third toes no change in drainage  painting with betadine thee to four times per week. In late January she underwent left lower extremity angiogram with angioplasty to left AT. Since that time improvemetn in left heel wound pain is better no fevers chills      Review of Systems      Objective:         Physical Exam  Constitutional:       Appearance: She is ill-appearing.   Eyes:      General: No scleral icterus.  Pulmonary:      Effort: Pulmonary effort is normal. No respiratory distress.   Musculoskeletal:      Right lower leg: No edema.      Left lower leg: No edema.   Skin:     Comments: Left second and third toes with stable dry gangrene withotu flutuance (demarcation around PIP joints) left heel with pink granualtion with some fibrijn to most proximal margin    Right first toe with distal dry gangrene wtihout fluctuance erythema or drainage   Neurological:      Mental Status: She is alert.         Vitals:    03/17/25 1613   BP: 122/75   Pulse: 64   Resp: 16   Temp: 98 °F (36.7 °C)       Assessment:           ICD-10-CM ICD-9-CM   1. Dry gangrene  I96 785.4   2. Heel ulcer, left, with unspecified severity  L97.429 707.14   3. Critical limb ischemia of both lower extremities  I70.223 440.22   4. PAD (peripheral artery disease)  I73.9 443.9   5. ESRD (end stage renal disease) on dialysis  N18.6 585.6    Z99.2 V45.11   6. Chronic combined systolic and diastolic heart failure  I50.42 428.42   7. Secondary renal cell carcinoma of lung, unspecified laterality  C78.00 197.0    C64.9 189.0            Altered Skin Integrity 02/22/24 0930 Left dorsal Toe, second Ulceration (Active)   02/22/24 0930   Altered Skin Integrity Present on Admission - Did Patient arrive to the hospital with altered skin?: yes   Side: Left   Orientation: dorsal   Location: Toe, second   Wound Number:    Is this injury device related?:    Primary Wound Type: Ulceration   Description of Altered Skin Integrity:    Ankle-Brachial Index:    Pulses:    Removal Indication and Assessment:    Wound Outcome:    (Retired) Wound Length (cm):    (Retired) Wound Width (cm):    (Retired) Depth (cm):    Wound Description (Comments):    Removal Indications:    Wound Image   03/17/25 1616   Dressing Appearance No dressing;Open to air 03/17/25 1616   Drainage  Amount None 03/17/25 1616   Appearance Maroon;Dry;Eschar 03/17/25 1616   Periwound Area Dry 03/17/25 1616   Wound Edges Defined 03/17/25 1616   Wound Length (cm) 6 cm 03/17/25 1616   Wound Width (cm) 2 cm 03/17/25 1616   Wound Surface Area (cm^2) 9.42 cm^2 03/17/25 1616   Tunneling (depth (cm)/location) 0 03/17/25 1616   Undermining (depth (cm)/location) 0 03/17/25 1616   Care Cleansed with:;Antimicrobial agent;Sterile normal saline 03/17/25 1616   Periwound Care Topical treatment applied 03/17/25 1616            Altered Skin Integrity 02/22/24 0930 Left plantar Heel Ulceration (Active)   02/22/24 0930   Altered Skin Integrity Present on Admission - Did Patient arrive to the hospital with altered skin?:    Side: Left   Orientation: plantar   Location: Heel   Wound Number:    Is this injury device related?:    Primary Wound Type: Ulceration   Description of Altered Skin Integrity:    Ankle-Brachial Index:    Pulses:    Removal Indication and Assessment:    Wound Outcome:    (Retired) Wound Length (cm):    (Retired) Wound Width (cm):    (Retired) Depth (cm):    Wound Description (Comments):    Removal Indications:    Wound Image    03/17/25 1616   Dressing Appearance No dressing;Open to air 03/17/25 1616   Drainage Amount None 03/17/25 1616   Appearance Pink;Red;Maroon;Fibrin;Moist 03/17/25 1616   Black (%), Wound Tissue Color 0 % 03/17/25 1616   Red (%), Wound Tissue Color 100 % 03/17/25 1616   Yellow (%), Wound Tissue Color 0 % 03/17/25 1616   Periwound Area Dry 03/17/25 1616   Wound Edges Defined 03/17/25 1616   Wound Length (cm) 3.4 cm 03/17/25 1616   Wound Width (cm) 2.5 cm 03/17/25 1616   Wound Depth (cm) 0.2 cm 03/17/25 1616   Wound Volume (cm^3) 0.89 cm^3 03/17/25 1616   Wound Surface Area (cm^2) 6.68 cm^2 03/17/25 1616   Tunneling (depth (cm)/location) 0 03/17/25 1616   Undermining (depth (cm)/location) 0 03/17/25 1616   Care Cleansed with:;Antimicrobial agent;Sterile normal saline;Debrided 03/17/25 1616    Dressing Applied 03/17/25 1616   Off Loading Football dressing;Off loading shoe 03/17/25 1616   Dressing Change Due 03/19/25 03/17/25 1616            Wound 07/22/24 Arterial Ulcer Right distal Toe, first (Active)   07/22/24  Toe, first   Present on Original Admission: Y   Primary Wound Type: Arterial ulc   Side: Right   Orientation: distal   Wound Approximate Age at First Assessment (Weeks):    Wound Number:    Is this injury device related?:    Incision Type:    Closure Method:    Wound Description (Comments):    Type:    Additional Comments:    Ankle-Brachial Index:    Pulses:    Removal Indication and Assessment:    Wound Outcome:    Wound Image   03/17/25 1616   Dressing Appearance No dressing;Open to air 03/17/25 1616   Drainage Amount None 03/17/25 1616   Appearance Maroon;Dry;Eschar 03/17/25 1616   Periwound Area Dry 03/17/25 1616   Wound Edges Defined 03/17/25 1616   Wound Length (cm) 4 cm 03/17/25 1616   Wound Width (cm) 4.2 cm 03/17/25 1616   Wound Surface Area (cm^2) 13.19 cm^2 03/17/25 1616   Tunneling (depth (cm)/location) 0 03/17/25 1616   Undermining (depth (cm)/location) 0 03/17/25 1616   Care Cleansed with:;Antimicrobial agent;Sterile normal saline;Povidone iodine 03/17/25 1616   Periwound Care Topical treatment applied 03/17/25 1616   Off Loading Off loading shoe 03/17/25 1616            Wound 11/25/24 Arterial Ulcer Left Toe, first (Active)   11/25/24  Toe, first   Present on Original Admission:    Primary Wound Type: Arterial ulc   Side: Left   Orientation:    Wound Approximate Age at First Assessment (Weeks):    Wound Number:    Is this injury device related?:    Incision Type:    Closure Method:    Wound Description (Comments):    Type:    Additional Comments:    Ankle-Brachial Index:    Pulses:    Removal Indication and Assessment:    Wound Outcome:    Wound Image   03/17/25 1616   Dressing Appearance No dressing;Open to air 03/17/25 1616   Drainage Amount None 03/17/25 1616   Appearance  Maroon;Dry;Eschar 03/17/25 1616   Periwound Area Dry 03/17/25 1616   Wound Edges Defined 03/17/25 1616   Wound Length (cm) 5.9 cm 03/17/25 1616   Wound Width (cm) 4.9 cm 03/17/25 1616   Wound Surface Area (cm^2) 22.71 cm^2 03/17/25 1616   Tunneling (depth (cm)/location) 0 03/17/25 1616   Undermining (depth (cm)/location) 0 03/17/25 1616   Care Cleansed with:;Antimicrobial agent;Sterile normal saline;Povidone iodine 03/17/25 1616   Periwound Care Topical treatment applied 03/17/25 1616   Off Loading Off loading shoe 03/17/25 1616            Wound 11/25/24 Arterial Ulcer Right medial Toe, second (Active)   11/25/24  Toe, second   Present on Original Admission:    Primary Wound Type: Arterial ulc   Side: Right   Orientation: medial   Wound Approximate Age at First Assessment (Weeks):    Wound Number:    Is this injury device related?:    Incision Type:    Closure Method:    Wound Description (Comments):    Type:    Additional Comments:    Ankle-Brachial Index:    Pulses:    Removal Indication and Assessment:    Wound Outcome:    Wound Image   03/17/25 1616   Dressing Appearance No dressing;Open to air 03/17/25 1616   Drainage Amount None 03/17/25 1616   Appearance Maroon;Moist 03/17/25 1616   Periwound Area Dry 03/17/25 1616   Wound Edges Defined 03/17/25 1616   Wound Length (cm) 3 cm 03/17/25 1616   Wound Width (cm) 1.4 cm 03/17/25 1616   Wound Surface Area (cm^2) 3.3 cm^2 03/17/25 1616   Tunneling (depth (cm)/location) 0 03/17/25 1616   Undermining (depth (cm)/location) 0 03/17/25 1616   Care Cleansed with:;Antimicrobial agent;Sterile normal saline;Povidone iodine 03/17/25 1616   Periwound Care Topical treatment applied 03/17/25 1616   Off Loading Off loading shoe 03/17/25 1616            Wound 03/17/25 1400 Other (comment) Left dorsal Toe, third (Active)   03/17/25 1400 Toe, third   Present on Original Admission: Y   Primary Wound Type: Other   Side: Left   Orientation: dorsal   Wound Approximate Age at First  Assessment (Weeks):    Wound Number:    Is this injury device related?:    Incision Type:    Closure Method:    Wound Description (Comments):    Type:    Additional Comments:    Ankle-Brachial Index:    Pulses:    Removal Indication and Assessment:    Wound Outcome:    Wound Image   03/17/25 1616   Dressing Appearance No dressing;Open to air 03/17/25 1616   Drainage Amount None 03/17/25 1616   Appearance Maroon;Black;Dry;Eschar 03/17/25 1616   Black (%), Wound Tissue Color 20 % 03/17/25 1616   Periwound Area Dry 03/17/25 1616   Wound Edges Defined 03/17/25 1616   Wound Length (cm) 1.1 cm 03/17/25 1616   Wound Width (cm) 2 cm 03/17/25 1616   Wound Surface Area (cm^2) 1.73 cm^2 03/17/25 1616   Tunneling (depth (cm)/location) 0 03/17/25 1616   Undermining (depth (cm)/location) 0 03/17/25 1616   Care Cleansed with:;Antimicrobial agent;Sterile normal saline;Povidone iodine 03/17/25 1616   Periwound Care Topical treatment applied 03/17/25 1616   Off Loading Off loading shoe 03/17/25 1616           Plan:          Significant improvement in left heel eschar now showing pink granulation tissue debridment to proximal margin done today with capillary bleeding continue offloadign dressing. Plan to allow toes to auto-amputate no evidence of conversion to wet gangrene continue betadine paint daily HH to change heel dressign weekly until return for woudn check in three weeks    Tissue pathology and/or culture taken     [] Yes      [x] No  Sharp debridement performed                   [x] Yes       [] No  Labs ordered     [] Yes       [x] No  Imaging ordered    [] Yes      [x] No    Orders Placed This Encounter   Procedures    Ambulatory referral/consult to Wound Clinic     Standing Status:   Standing     Number of Occurrences:   1     Referral Priority:   Urgent     Referral Type:   Consultation     Referral Reason:   Specialty Services Required     Requested Specialty:   Wound Care     Number of Visits Requested:   1    Change  dressing     Wound Dressing Orders    Dressing change frequency twice a week and HH will visit patient when not seen in clinic  Remove old dressing  Cleanse or irrigate with: Normal Saline    Left 1st-3rd Toes, Right 1st-2nd Toes  Clean with Normal Saline  Patient will paint with betadine daily. Leave open to air.  Cast padding between toes.    Left Heel  Clean with Normal Saline  Primary dressing: Aquacel Extra to wound bed  Secondary dressing: 4x8 Jeremias,Football with toes out  (cast padding x 3), apply flexinet   Cast padding between toes    SUBSEQUENT HOME HEALTH ORDERS     Home Health for Wound Care. Visit twice a week when not in clinic. Patient next wound visit 04/07/2025.    Wound Dressing Orders    Put foam dressing between the two toes    For Right 1st -2nd Toes, Left 1st-3rd Toes  Patient is using betadine to treat wounds on foot, daily. Leave open to air. Please provide supplies for daily changes.    Left heel wound  Apply Aquacel Extra to wound bed, then apply Jeremias foam cupping heel, football with toes out (cast padding x 3),apply flexinet.      Evaluate for acute changes (purulence, increased redness/swelling, increased drainage, malodor, increased pain, pallor, necrosis) please contact physician on any acute changes.    DO NOT DEVIATE FROM ORDER. PLEASE ORDER SUPPLIES NEED TO APPLY TO PATIENT'S WOUNDS. Call [facility] at [phone] if there are any questions about dressing application or substitutions.     What Home Health Agency is the patient currently using?:   [facility]        Follow up in about 3 weeks (around 4/7/2025) for Wound Care.

## 2025-03-18 NOTE — PROCEDURES
Wound Debridement    Date/Time: 3/17/2025 2:00 PM    Performed by: Mode Jones MD  Authorized by: Mode Jones MD    Consent Done?:  Yes (Verbal)  Local anesthesia used?: Yes    Local anesthetic:  Topical anesthetic    Wound Details:    Location:  Left foot    Location:  Left Heel    Type of Debridement:  Excisional       Length (cm):  3.5       Width (cm):  2.4       Depth (cm):  0.2       Area (sq cm):  6.6       Percent Debrided (%):  20       Total Area Debrided (sq cm):  1.32    Depth of debridement:  Subcutaneous tissue    Tissue debrided:  Subcutaneous    Devitalized tissue debrided:  Biofilm, Slough and Fibrin    Instruments:  Curette  Bleeding:  Minimal  Hemostasis Achieved: Yes  Method Used:  Pressure  Patient tolerance:  Patient tolerated the procedure well with no immediate complications

## 2025-03-26 ENCOUNTER — EXTERNAL HOME HEALTH (OUTPATIENT)
Dept: HOME HEALTH SERVICES | Facility: HOSPITAL | Age: 60
End: 2025-03-26
Payer: COMMERCIAL

## 2025-04-07 ENCOUNTER — HOSPITAL ENCOUNTER (OUTPATIENT)
Dept: WOUND CARE | Facility: HOSPITAL | Age: 60
Discharge: HOME | End: 2025-04-07
Attending: SURGERY
Payer: MEDICARE

## 2025-04-07 VITALS — DIASTOLIC BLOOD PRESSURE: 89 MMHG | TEMPERATURE: 98 F | SYSTOLIC BLOOD PRESSURE: 175 MMHG | HEART RATE: 85 BPM

## 2025-04-07 DIAGNOSIS — I96 GANGRENE OF TOE OF RIGHT FOOT: ICD-10-CM

## 2025-04-07 DIAGNOSIS — L97.429 HEEL ULCER, LEFT, WITH UNSPECIFIED SEVERITY: Primary | ICD-10-CM

## 2025-04-07 DIAGNOSIS — L97.529 ISCHEMIC TOE ULCER, LEFT, WITH UNSPECIFIED SEVERITY: ICD-10-CM

## 2025-04-07 PROCEDURE — 99213 OFFICE O/P EST LOW 20 MIN: CPT | Performed by: INTERNAL MEDICINE

## 2025-04-07 PROCEDURE — 99214 OFFICE O/P EST MOD 30 MIN: CPT | Mod: ,,, | Performed by: INTERNAL MEDICINE

## 2025-04-07 NOTE — PROGRESS NOTES
[facility]                Progress Note    Subjective:       Patient ID: Eva Bloom is a 59 y.o. female.    Chief Complaint: Wound Check    Follow Up wound care visit for right and left distal toes and left heel wounds. Patient presented  via wheelchair pushed by   to [facility] with nurse at side. Patient denies fever, chills, nausea, vomiting or diarrhea at present. Patient c/o  pain to wound beds 4/10 at present. Patient reports she does not take any thing for pain. Dressing intact to left heel wound. Patient reports not having any issues with dressing since last visit. Dressing appear without strike through drainage fro left heel wound. Dressing removed & wound cleaned by nurse. Dressing  was discarded.Topical Lidocaine 5% ointment applied to wound bed and allowed to absorb for 15 minutes for patient comfort to left heel. Right 1st-2nd toes and Left 1st -3rd toes necrotic,continues with betadine paint and leave open to air. Left heel wound healing well with red moist wound bed, periwound pink.  No change to dressing order; applied per MD orders. Patient will return to [facility] in 4 weeks. HH will follow up  2 x week when not in clinic. AVS printed and given to patient with  print out of all future scheduled appointments.       No issues with heel dresing home health changes weekly  painting gangernous toes with betadine daily no periwound pain no fevers/chills she is tolerating her full sessions of HD        Review of Systems      Objective:        Physical Exam  Constitutional:       Appearance: She is ill-appearing.   HENT:      Head: Normocephalic and atraumatic.   Abdominal:      General: There is no distension.      Palpations: Abdomen is soft.   Musculoskeletal:      Right lower leg: No edema.      Left lower leg: No edema.   Skin:     Comments: Left heel ulcer significantly smaller with pink granulation centerally    Dry gangrene to first toe and  second toe on left and first and second toe on right no fluctuance oer expressible discahrge   Neurological:      Mental Status: She is alert.         Vitals:    04/07/25 0843   BP: (!) 175/89   Pulse: 85   Temp: 97.5 °F (36.4 °C)       Assessment:           ICD-10-CM ICD-9-CM   1. Heel ulcer, left, with unspecified severity  L97.429 707.14   2. Gangrene of toe of right foot  I96 785.4   3. Ischemic toe ulcer, left, with unspecified severity  L97.529 707.15            Altered Skin Integrity 02/22/24 0930 Left dorsal Toe, second Ulceration (Active)   02/22/24 0930   Altered Skin Integrity Present on Admission - Did Patient arrive to the hospital with altered skin?: yes   Side: Left   Orientation: dorsal   Location: Toe, second   Wound Number:    Is this injury device related?:    Primary Wound Type: Ulceration   Description of Altered Skin Integrity:    Ankle-Brachial Index:    Pulses:    Removal Indication and Assessment:    Wound Outcome:    (Retired) Wound Length (cm):    (Retired) Wound Width (cm):    (Retired) Depth (cm):    Wound Description (Comments):    Removal Indications:    Wound Image   04/07/25 0936   Dressing Appearance No dressing 04/07/25 0936   Drainage Amount None 04/07/25 0936   Appearance Maroon;Yellow;Black 04/07/25 0936   Black (%), Wound Tissue Color 90 % 04/07/25 0936   Red (%), Wound Tissue Color 5 % 04/07/25 0936   Yellow (%), Wound Tissue Color 5 % 04/07/25 0936   Periwound Area Dry 04/07/25 0936   Wound Edges Defined 04/07/25 0936   Wound Length (cm) 6.9 cm 04/07/25 0936   Wound Width (cm) 7.6 cm 04/07/25 0936   Wound Surface Area (cm^2) 41.19 cm^2 04/07/25 0936   Tunneling (depth (cm)/location) 0 04/07/25 0936   Undermining (depth (cm)/location) 0 04/07/25 0936   Care Cleansed with:;Antimicrobial agent;Sterile normal saline 04/07/25 0936   Periwound Care Topical treatment applied 04/07/25 0936            Altered Skin Integrity 02/22/24 0930 Left plantar Heel Ulceration (Active)    02/22/24 0930   Altered Skin Integrity Present on Admission - Did Patient arrive to the hospital with altered skin?:    Side: Left   Orientation: plantar   Location: Heel   Wound Number:    Is this injury device related?:    Primary Wound Type: Ulceration   Description of Altered Skin Integrity:    Ankle-Brachial Index:    Pulses:    Removal Indication and Assessment:    Wound Outcome:    (Retired) Wound Length (cm):    (Retired) Wound Width (cm):    (Retired) Depth (cm):    Wound Description (Comments):    Removal Indications:    Wound Image   04/07/25 0936   Dressing Appearance Intact;Moist drainage 04/07/25 0936   Drainage Amount Small 04/07/25 0936   Drainage Characteristics/Odor Serosanguineous;No odor 04/07/25 0936   Appearance Pink;Red;Moist 04/07/25 0936   Tissue loss description Partial thickness 04/07/25 0936   Black (%), Wound Tissue Color 0 % 04/07/25 0936   Red (%), Wound Tissue Color 100 % 04/07/25 0936   Yellow (%), Wound Tissue Color 0 % 04/07/25 0936   Periwound Area [place] 04/07/25 0936   Wound Edges Open;Defined 04/07/25 0936   Wound Length (cm) 1 cm 04/07/25 0936   Wound Width (cm) 1.3 cm 04/07/25 0936   Wound Depth (cm) 0.1 cm 04/07/25 0936   Wound Volume (cm^3) 0.068 cm^3 04/07/25 0936   Wound Surface Area (cm^2) 1.02 cm^2 04/07/25 0936   Tunneling (depth (cm)/location) 0 04/07/25 0936   Undermining (depth (cm)/location) 0 04/07/25 0936   Care Cleansed with:;Antimicrobial agent;Sterile normal saline 04/07/25 0936   Dressing Applied;Other (comment);Cast padding 04/07/25 0936   Off Loading Football dressing;Off loading shoe 04/07/25 0936            Wound 07/22/24 Arterial Ulcer Right distal Toe, first (Active)   07/22/24  Toe, first   Present on Original Admission: Y   Primary Wound Type: Arterial ulc   Side: Right   Orientation: distal   Wound Approximate Age at First Assessment (Weeks):    Wound Number:    Is this injury device related?:    Incision Type:    Closure Method:    Wound Description  (Comments):    Type:    Additional Comments:    Ankle-Brachial Index:    Pulses:    Removal Indication and Assessment:    Wound Outcome:    Wound Image   04/07/25 0936   Dressing Appearance No dressing 04/07/25 0936   Drainage Amount Small 04/07/25 0936   Drainage Characteristics/Odor Serosanguineous;No odor 04/07/25 0936   Appearance Maroon;Black;Yellow 04/07/25 0936   Black (%), Wound Tissue Color 80 % 04/07/25 0936   Red (%), Wound Tissue Color 15 % 04/07/25 0936   Yellow (%), Wound Tissue Color 5 % 04/07/25 0936   Periwound Area Dry 04/07/25 0936   Wound Edges Defined 04/07/25 0936   Wound Length (cm) 4.5 cm 04/07/25 0936   Wound Width (cm) 4.7 cm 04/07/25 0936   Wound Surface Area (cm^2) 16.61 cm^2 04/07/25 0936   Tunneling (depth (cm)/location) 0 04/07/25 0936   Undermining (depth (cm)/location) 0 04/07/25 0936   Care Cleansed with:;Antimicrobial agent;Sterile normal saline 04/07/25 0936   Periwound Care Topical treatment applied 04/07/25 0936   Off Loading Off loading shoe 04/07/25 0936            Wound 11/25/24 Arterial Ulcer Left Toe, first (Active)   11/25/24  Toe, first   Present on Original Admission:    Primary Wound Type: Arterial ulc   Side: Left   Orientation:    Wound Approximate Age at First Assessment (Weeks):    Wound Number:    Is this injury device related?:    Incision Type:    Closure Method:    Wound Description (Comments):    Type:    Additional Comments:    Ankle-Brachial Index:    Pulses:    Removal Indication and Assessment:    Wound Outcome:    Wound Image   04/07/25 0936   Dressing Appearance No dressing 04/07/25 0936   Drainage Amount None 04/07/25 0936   Appearance Maroon;Yellow;Eschar;Black 04/07/25 0936   Black (%), Wound Tissue Color 80 % 04/07/25 0936   Red (%), Wound Tissue Color 10 % 04/07/25 0936   Yellow (%), Wound Tissue Color 10 % 04/07/25 0936   Periwound Area Dry 04/07/25 0936   Wound Edges Defined 04/07/25 0936   Wound Length (cm) 5.3 cm 04/07/25 0936   Wound Width (cm)  4.9 cm 04/07/25 0936   Wound Surface Area (cm^2) 20.4 cm^2 04/07/25 0936   Tunneling (depth (cm)/location) 0 04/07/25 0936   Undermining (depth (cm)/location) 0 04/07/25 0936   Care Cleansed with:;Antimicrobial agent;Sterile normal saline 04/07/25 0936   Periwound Care Topical treatment applied 04/07/25 0936   Off Loading Off loading shoe 04/07/25 0936            Wound 11/25/24 Arterial Ulcer Right medial Toe, second (Active)   11/25/24  Toe, second   Present on Original Admission:    Primary Wound Type: Arterial ulc   Side: Right   Orientation: medial   Wound Approximate Age at First Assessment (Weeks):    Wound Number:    Is this injury device related?:    Incision Type:    Closure Method:    Wound Description (Comments):    Type:    Additional Comments:    Ankle-Brachial Index:    Pulses:    Removal Indication and Assessment:    Wound Outcome:    Wound Image   04/07/25 0936   Dressing Appearance No dressing 04/07/25 0936   Drainage Amount None 04/07/25 0936   Appearance Maroon;Black 04/07/25 0936   Black (%), Wound Tissue Color 85 % 04/07/25 0936   Red (%), Wound Tissue Color 10 % 04/07/25 0936   Yellow (%), Wound Tissue Color 5 % 04/07/25 0936   Periwound Area Dry 04/07/25 0936   Wound Edges Defined 04/07/25 0936   Wound Length (cm) 4 cm 04/07/25 0936   Wound Width (cm) 1.5 cm 04/07/25 0936   Wound Surface Area (cm^2) 4.71 cm^2 04/07/25 0936   Tunneling (depth (cm)/location) 0 04/07/25 0936   Undermining (depth (cm)/location) 0 04/07/25 0936   Care Cleansed with:;Antimicrobial agent;Sterile normal saline 04/07/25 0936   Periwound Care Topical treatment applied 04/07/25 0936   Off Loading Off loading shoe 04/07/25 0936            Wound 03/17/25 1400 Other (comment) Left dorsal Toe, third (Active)   03/17/25 1400 Toe, third   Present on Original Admission: Y   Primary Wound Type: Other   Side: Left   Orientation: dorsal   Wound Approximate Age at First Assessment (Weeks):    Wound Number:    Is this injury device  related?:    Incision Type:    Closure Method:    Wound Description (Comments):    Type:    Additional Comments:    Ankle-Brachial Index:    Pulses:    Removal Indication and Assessment:    Wound Outcome:    Wound Image   04/07/25 0936   Dressing Appearance No dressing 04/07/25 0936   Drainage Amount None 04/07/25 0936   Appearance Yellow;Black;Red;Eschar 04/07/25 0936   Black (%), Wound Tissue Color 20 % 04/07/25 0936   Red (%), Wound Tissue Color 20 % 04/07/25 0936   Yellow (%), Wound Tissue Color 60 % 04/07/25 0936   Periwound Area Dry 04/07/25 0936   Wound Edges Defined 04/07/25 0936   Wound Length (cm) 1.7 cm 04/07/25 0936   Wound Width (cm) 1.5 cm 04/07/25 0936   Wound Surface Area (cm^2) 2 cm^2 04/07/25 0936   Tunneling (depth (cm)/location) 0 04/07/25 0936   Undermining (depth (cm)/location) 0 04/07/25 0936   Care Cleansed with:;Antimicrobial agent;Sterile normal saline 04/07/25 0936   Periwound Care Topical treatment applied 04/07/25 0936   Off Loading Off loading shoe 04/07/25 0936           Plan:            Heel wound improving s/p LE angiogram and offloading continue current dressing not candiate for HD in light of metastatinc renal cell carcinoma expectant management of dry gangrene with betadine paint daily to allow autoamputation continue home health for derssing changes and return for wound check in four weeks no sharp debridement required   Tissue pathology and/or culture taken     [] Yes      [x] No  Sharp debridement performed                   [] Yes       [x] No  Labs ordered     [] Yes       [x] No  Imaging ordered    [] Yes      [x] No    Orders Placed This Encounter   Procedures    Change dressing     Wound Dressing Orders    Dressing change frequency twice a week and HH will visit patient when not seen in clinic  Remove old dressing  Cleanse or irrigate with: Normal Saline    Left 1st-3rd Toes, Right 1st-2nd Toes  Clean with Normal Saline  Patient will paint with betadine daily. Leave open  to air.  Cast padding between toes.    Left Heel  Clean with Normal Saline  Primary dressing: Aquacel Extra to wound bed  Secondary dressing: 4x8 Jeremias,Football with toes out  (cast padding x 3), apply flexinet  Cast padding between toes    SUBSEQUENT HOME HEALTH ORDERS     Home Health for Wound Care. Visit twice a week when not in clinic. Patient next wound visit 05/05/2025.    Wound Dressing Orders    Put foam dressing between the two toes    For Right 1st -2nd Toes, Left 1st-3rd Toes  Patient is using betadine to treat wounds on foot, daily. Leave open to air. Please provide supplies for daily changes.    Left heel wound  Apply Aquacel Extra to wound bed, then apply Jeremias foam cupping heel, football with toes out (cast padding x 3),apply flexinet.      Evaluate for acute changes (purulence, increased redness/swelling, increased drainage, malodor, increased pain, pallor, necrosis) please contact physician on any acute changes.    DO NOT DEVIATE FROM ORDER. PLEASE ORDER SUPPLIES NEED TO APPLY TO PATIENT'S WOUNDS. Call [facility] at [phone] if there are any questions about dressing application or substitutions.     What Home Health Agency is the patient currently using?:   [facility]        Follow up in about 4 weeks (around 5/5/2025) for Wound Care.

## 2025-04-16 ENCOUNTER — TELEPHONE (OUTPATIENT)
Dept: FAMILY MEDICINE | Facility: CLINIC | Age: 60
End: 2025-04-16
Payer: MEDICARE

## 2025-04-16 NOTE — TELEPHONE ENCOUNTER
----- Message from Leonardo sent at 4/16/2025 11:34 AM CDT -----  Regarding: self  Type: Patient Call BackWho called:selfWhat is the request in detail:Patient needs a fax to be sent to [employer] of the medical explanation benefitsFax # [phone] #[phone] DOS-September 3, 2024Can the clinic reply by [facility]? NoWould the patient rather a call back or a response via [facility]? Call [facility] call back number:[phone] Information:Thank you.

## 2025-04-16 NOTE — TELEPHONE ENCOUNTER
Spoke with patient's spouse. Spouse is requesting medical records to be faxed over to insurance. Spouse was advised to call medical records with next steps. Spouse verbalized understanding and does not have any other questions or concerns at this time.

## 2025-04-30 RX ORDER — ATORVASTATIN CALCIUM 40 MG/1
40 TABLET, FILM COATED ORAL
Qty: 90 TABLET | Refills: 0 | Status: SHIPPED | OUTPATIENT
Start: 2025-04-30

## 2025-04-30 NOTE — TELEPHONE ENCOUNTER
Refill Routing Note   Medication(s) are not appropriate for processing by [facility] for the following reason(s):        Required labs outdated    [facility] action(s):  Defer               Appointments  past 12m or future 3m with PCP    Date Provider   Last Visit   10/30/2024 Sowmya Mccain MD   Next Visit   Visit date not found Sowmya Mccain MD   ED visits in past 90 days: 0        Note composed:11:10 AM 04/30/2025

## 2025-04-30 NOTE — TELEPHONE ENCOUNTER
No care due was identified.  [facility] Embedded Care Due Messages. Reference number: [id number].   4/30/2025 9:39:15 AM CDT

## 2025-05-05 ENCOUNTER — HOSPITAL ENCOUNTER (OUTPATIENT)
Dept: WOUND CARE | Facility: HOSPITAL | Age: 60
Discharge: HOME | End: 2025-05-05
Attending: SURGERY
Payer: MEDICARE

## 2025-05-05 VITALS — DIASTOLIC BLOOD PRESSURE: 75 MMHG | TEMPERATURE: 97 F | HEART RATE: 70 BPM | SYSTOLIC BLOOD PRESSURE: 159 MMHG

## 2025-05-05 DIAGNOSIS — L97.529 ISCHEMIC TOE ULCER, LEFT, WITH UNSPECIFIED SEVERITY: ICD-10-CM

## 2025-05-05 DIAGNOSIS — L97.429 HEEL ULCER, LEFT, WITH UNSPECIFIED SEVERITY: Primary | ICD-10-CM

## 2025-05-05 PROCEDURE — 99213 OFFICE O/P EST LOW 20 MIN: CPT | Performed by: INTERNAL MEDICINE

## 2025-05-05 PROCEDURE — 99214 OFFICE O/P EST MOD 30 MIN: CPT | Mod: GV,,, | Performed by: INTERNAL MEDICINE

## 2025-05-05 NOTE — PROGRESS NOTES
[facility]                Progress Note    Subjective:       Patient ID: Eva Bloom is a 59 y.o. female.    Chief Complaint: Wound Check    Follow Up wound care visit for bilateral foot wounds.  Patient presented via wheelchair pushed by  to [facility] with nurse at side. Patient denies fever, chills, nausea, vomiting or diarrhea at present. Patient c/o pain to wound beds at present. Patient with a mepilex heel border dressing to left heel wound. Left heel wound with yellow fibrin cap. Fibrin cap removed form left heel wound by MD, wound healed.  No dressing to bilateral toe wounds. Continue current treatment of betadine to toes. Changes made to dressing order; applied per MD orders. Applied mepilex heel border to left heel. Patient will return to [facility] in  4 weeks. HH will follow up once a week. AVS printed and given to patient with print out of all future scheduled appointments.      painting toes daily with betadine. Left heel dressing has been changed weekly with home health no strike through drainage. Tolerating full times on hemodialysis w/o hypotension      Review of Systems      Objective:        Physical Exam  Constitutional:       Appearance: She is ill-appearing.   Musculoskeletal:      Right lower leg: No edema.      Left lower leg: No edema.   Skin:     Comments: Left heel non blanching pink epithelium wihtout fluctuance or new breakdown    Toes dry gangrene without fluctuance or expressible discharge   Neurological:      Mental Status: She is alert.         Vitals:    05/05/25 0846   BP: (!) 159/75   Pulse: 70   Temp: 97.1 °F (36.2 °C)       Assessment:           ICD-10-CM ICD-9-CM   1. Heel ulcer, left, with unspecified severity  L97.429 707.14   2. Ischemic toe ulcer, left, with unspecified severity  L97.529 707.15            Altered Skin Integrity 02/22/24 0930 Left dorsal Toe, second Ulceration (Active)   02/22/24 0930   Altered Skin  Integrity Present on Admission - Did Patient arrive to the hospital with altered skin?: yes   Side: Left   Orientation: dorsal   Location: Toe, second   Wound Number:    Is this injury device related?:    Primary Wound Type: Ulceration   Description of Altered Skin Integrity:    Ankle-Brachial Index:    Pulses:    Removal Indication and Assessment:    Wound Outcome:    (Retired) Wound Length (cm):    (Retired) Wound Width (cm):    (Retired) Depth (cm):    Wound Description (Comments):    Removal Indications:    Wound Image    05/05/25 0847   Dressing Appearance No dressing 05/05/25 0847   Drainage Amount None 05/05/25 0847   Appearance Maroon;Black;Yellow;Eschar;Dry 05/05/25 0847   Black (%), Wound Tissue Color 40 % 05/05/25 0847   Red (%), Wound Tissue Color 20 % 05/05/25 0847   Yellow (%), Wound Tissue Color 40 % 05/05/25 0847   Periwound Area Dry 05/05/25 0847   Wound Edges Defined 05/05/25 0847   Wound Length (cm) 6.9 cm 05/05/25 0847   Wound Width (cm) 5.7 cm 05/05/25 0847   Wound Surface Area (cm^2) 30.89 cm^2 05/05/25 0847   Tunneling (depth (cm)/location) 0 05/05/25 0847   Undermining (depth (cm)/location) 0 05/05/25 0847   Care Cleansed with:;Antimicrobial agent;Sterile normal saline 05/05/25 0847   Periwound Care Topical treatment applied 05/05/25 0847   Off Loading Off loading shoe;Other (see comments) 05/05/25 0847            Altered Skin Integrity 02/22/24 0930 Left plantar Heel Ulceration (Active)   02/22/24 0930   Altered Skin Integrity Present on Admission - Did Patient arrive to the hospital with altered skin?:    Side: Left   Orientation: plantar   Location: Heel   Wound Number:    Is this injury device related?:    Primary Wound Type: Ulceration   Description of Altered Skin Integrity:    Ankle-Brachial Index:    Pulses:    Removal Indication and Assessment:    Wound Outcome:    (Retired) Wound Length (cm):    (Retired) Wound Width (cm):    (Retired) Depth (cm):    Wound Description (Comments):     Removal Indications:    Wound Image    05/05/25 0847   Dressing Appearance Intact;Clean 05/05/25 0847   Drainage Amount None 05/05/25 0847   Appearance Pink;Yellow;Fibrin;Dry 05/05/25 0847   Periwound Area [place] 05/05/25 0847   Wound Edges Defined 05/05/25 0847   Wound Length (cm) 0 cm 05/05/25 0847   Wound Width (cm) 0 cm 05/05/25 0847   Wound Depth (cm) 0 cm 05/05/25 0847   Wound Volume (cm^3) 0 cm^3 05/05/25 0847   Wound Surface Area (cm^2) 0 cm^2 05/05/25 0847   Tunneling (depth (cm)/location) 0 05/05/25 0847   Undermining (depth (cm)/location) 0 05/05/25 0847   Care Cleansed with:;Antimicrobial agent;Sterile normal saline 05/05/25 0847   Dressing Applied 05/05/25 0847   Off Loading Other (see comments) 05/05/25 0847   Dressing Change Due 05/12/25 05/05/25 0847            Wound 07/22/24 Arterial Ulcer Right distal Toe, first (Active)   07/22/24  Toe, first   Present on Original Admission: Y   Primary Wound Type: Arterial ulc   Side: Right   Orientation: distal   Wound Approximate Age at First Assessment (Weeks):    Wound Number:    Is this injury device related?:    Incision Type:    Closure Method:    Wound Description (Comments):    Type:    Additional Comments:    Ankle-Brachial Index:    Pulses:    Removal Indication and Assessment:    Wound Outcome:    Wound Image   05/05/25 0847   Dressing Appearance No dressing 05/05/25 0847   Drainage Amount None 05/05/25 0847   Appearance Maroon;Black;Yellow;Eschar;Dry 05/05/25 0847   Black (%), Wound Tissue Color 10 % 05/05/25 0847   Red (%), Wound Tissue Color 30 % 05/05/25 0847   Yellow (%), Wound Tissue Color 60 % 05/05/25 0847   Periwound Area Dry 05/05/25 0847   Wound Edges Defined 05/05/25 0847   Wound Length (cm) 4.5 cm 05/05/25 0847   Wound Width (cm) 8.5 cm 05/05/25 0847   Wound Surface Area (cm^2) 30.04 cm^2 05/05/25 0847   Tunneling (depth (cm)/location) 0 05/05/25 0847   Undermining (depth (cm)/location) 0 05/05/25 0847   Care Cleansed with:;Antimicrobial  agent;Sterile normal saline 05/05/25 0847   Periwound Care Topical treatment applied 05/05/25 0847   Off Loading Off loading shoe 05/05/25 0847            Wound 11/25/24 Arterial Ulcer Left Toe, first (Active)   11/25/24  Toe, first   Present on Original Admission:    Primary Wound Type: Arterial ulc   Side: Left   Orientation:    Wound Approximate Age at First Assessment (Weeks):    Wound Number:    Is this injury device related?:    Incision Type:    Closure Method:    Wound Description (Comments):    Type:    Additional Comments:    Ankle-Brachial Index:    Pulses:    Removal Indication and Assessment:    Wound Outcome:    Wound Image   05/05/25 0847   Dressing Appearance No dressing 05/05/25 0847   Drainage Amount None 05/05/25 0847   Appearance Maroon;Yellow;Eschar;Dry 05/05/25 0847   Red (%), Wound Tissue Color 50 % 05/05/25 0847   Yellow (%), Wound Tissue Color 50 % 05/05/25 0847   Periwound Area Dry 05/05/25 0847   Wound Edges Defined 05/05/25 0847   Wound Length (cm) 5.3 cm 05/05/25 0847   Wound Width (cm) 5.3 cm 05/05/25 0847   Wound Surface Area (cm^2) 22.06 cm^2 05/05/25 0847   Tunneling (depth (cm)/location) 0 05/05/25 0847   Undermining (depth (cm)/location) 0 05/05/25 0847   Care Cleansed with:;Antimicrobial agent;Sterile normal saline 05/05/25 0847   Periwound Care Topical treatment applied 05/05/25 0847   Off Loading Off loading shoe;Other (see comments) 05/05/25 0847            Wound 11/25/24 Arterial Ulcer Right medial Toe, second (Active)   11/25/24  Toe, second   Present on Original Admission:    Primary Wound Type: Arterial ulc   Side: Right   Orientation: medial   Wound Approximate Age at First Assessment (Weeks):    Wound Number:    Is this injury device related?:    Incision Type:    Closure Method:    Wound Description (Comments):    Type:    Additional Comments:    Ankle-Brachial Index:    Pulses:    Removal Indication and Assessment:    Wound Outcome:    Wound Image   05/05/25 0847    Dressing Appearance No dressing 05/05/25 0847   Drainage Amount None 05/05/25 0847   Appearance Maroon;Yellow;Dry;Red 05/05/25 0847   Red (%), Wound Tissue Color 90 % 05/05/25 0847   Yellow (%), Wound Tissue Color 10 % 05/05/25 0847   Periwound Area Dry 05/05/25 0847   Wound Edges Defined 05/05/25 0847   Wound Length (cm) 1.4 cm 05/05/25 0847   Wound Width (cm) 2.6 cm 05/05/25 0847   Wound Surface Area (cm^2) 2.86 cm^2 05/05/25 0847   Tunneling (depth (cm)/location) 0 05/05/25 0847   Undermining (depth (cm)/location) 0 05/05/25 0847   Care Cleansed with:;Antimicrobial agent;Sterile normal saline 05/05/25 0847   Periwound Care Topical treatment applied 05/05/25 0847   Off Loading Off loading shoe;Other (see comments) 05/05/25 0847            Wound 03/17/25 1400 Other (comment) Left dorsal Toe, third (Active)   03/17/25 1400 Toe, third   Present on Original Admission: Y   Primary Wound Type: Other   Side: Left   Orientation: dorsal   Wound Approximate Age at First Assessment (Weeks):    Wound Number:    Is this injury device related?:    Incision Type:    Closure Method:    Wound Description (Comments):    Type:    Additional Comments:    Ankle-Brachial Index:    Pulses:    Removal Indication and Assessment:    Wound Outcome:    Wound Image   05/05/25 0847   Dressing Appearance No dressing 05/05/25 0847   Drainage Amount None 05/05/25 0847   Appearance Yellow;Black;Eschar;Dry 05/05/25 0847   Black (%), Wound Tissue Color 20 % 05/05/25 0847   Yellow (%), Wound Tissue Color 80 % 05/05/25 0847   Periwound Area Dry 05/05/25 0847   Wound Edges Defined 05/05/25 0847   Wound Length (cm) 2 cm 05/05/25 0847   Wound Width (cm) 3 cm 05/05/25 0847   Wound Surface Area (cm^2) 4.71 cm^2 05/05/25 0847   Tunneling (depth (cm)/location) 0 05/05/25 0847   Undermining (depth (cm)/location) 0 05/05/25 0847   Care Cleansed with:;Antimicrobial agent;Sterile normal saline 05/05/25 0847   Periwound Care Topical treatment applied 05/05/25  0847   Off Loading Off loading shoe 05/05/25 0847           Plan:          Still with at risk heel switch to heel mepilex and patient dispensed heel boots to wear when lying in bed. Continue expectant management of dry gangrene with daily betadine paint. Home health weekly dressing change return for wound check in one month    Tissue pathology and/or culture taken     [] Yes      [x] No  Sharp debridement performed                   [] Yes       [x] No  Labs ordered     [] Yes       [x] No  Imaging ordered    [] Yes      [x] No    Orders Placed This Encounter   Procedures    Change dressing     Wound Dressing Orders    Dressing change frequency twice a week and HH will visit patient when not seen in clinic  Remove old dressing  Cleanse or irrigate with: Normal Saline    Left 1st-3rd Toes, Right 1st-2nd Toes  Clean with Normal Saline  Patient will paint with betadine daily. Leave open to air.  Cast padding between toes.    Left Heel  Clean with Normal Saline  Primary dressing: Mepilex Heel Border  Cast padding between toes    SUBSEQUENT HOME HEALTH ORDERS     Home Health for Wound Care. Visit once a week when not in clinic. Patient next wound visit 06/02/2025.    Wound Dressing Orders    Put foam dressing between the two toes    For Right 1st -2nd Toes, Left 1st-3rd Toes  Patient is using betadine to treat wounds on foot, daily. Leave open to air. Please provide supplies for daily changes.    Left heel wound  Mepilex Heel border      Evaluate for acute changes (purulence, increased redness/swelling, increased drainage, malodor, increased pain, pallor, necrosis) please contact physician on any acute changes.    DO NOT DEVIATE FROM ORDER. PLEASE ORDER SUPPLIES NEED TO APPLY TO PATIENT'S WOUNDS. Call [facility] at [phone] if there are any questions about dressing application or substitutions.     What Home Health Agency is the patient currently using?:   [facility]        Follow up in about 4 weeks (around  6/2/2025) for Wound Care.        No

## 2025-05-08 ENCOUNTER — DOCUMENT SCAN (OUTPATIENT)
Dept: HOME HEALTH SERVICES | Facility: HOSPITAL | Age: 60
End: 2025-05-08
Payer: MEDICARE

## 2025-05-09 ENCOUNTER — TELEPHONE (OUTPATIENT)
Dept: WOUND CARE | Facility: HOSPITAL | Age: 60
End: 2025-05-09
Payer: MEDICARE

## 2025-05-09 NOTE — TELEPHONE ENCOUNTER
Return call to Ms. Moore @ [facility], clarified patient to be seen once a week when not in clinic.

## 2025-05-16 ENCOUNTER — DOCUMENT SCAN (OUTPATIENT)
Dept: HOME HEALTH SERVICES | Facility: HOSPITAL | Age: 60
End: 2025-05-16
Payer: MEDICARE

## 2025-05-22 PROCEDURE — G0179 MD RECERTIFICATION HHA PT: HCPCS | Mod: GW,,, | Performed by: INTERNAL MEDICINE

## 2025-05-24 ENCOUNTER — HOSPITAL ENCOUNTER (INPATIENT)
Facility: HOSPITAL | Age: 60
LOS: 1 days | Discharge: OTHER INSTITUTION | DRG: 919 | End: 2025-05-25
Attending: EMERGENCY MEDICINE | Admitting: STUDENT IN AN ORGANIZED HEALTH CARE EDUCATION/TRAINING PROGRAM
Payer: MEDICARE

## 2025-05-24 DIAGNOSIS — R07.9 CHEST PAIN: ICD-10-CM

## 2025-05-24 DIAGNOSIS — R79.89 ELEVATED TROPONIN: ICD-10-CM

## 2025-05-24 DIAGNOSIS — K06.8 GUMS, BLEEDING: ICD-10-CM

## 2025-05-24 DIAGNOSIS — Z13.6 SCREENING FOR CARDIOVASCULAR CONDITION: ICD-10-CM

## 2025-05-24 DIAGNOSIS — A41.9 SEPSIS, DUE TO UNSPECIFIED ORGANISM, UNSPECIFIED WHETHER ACUTE ORGAN DYSFUNCTION PRESENT: ICD-10-CM

## 2025-05-24 DIAGNOSIS — I95.9 HYPOTENSION, UNSPECIFIED HYPOTENSION TYPE: ICD-10-CM

## 2025-05-24 DIAGNOSIS — R94.31 PROLONGED Q-T INTERVAL ON ECG: Primary | ICD-10-CM

## 2025-05-24 DIAGNOSIS — A41.9 SEPSIS: ICD-10-CM

## 2025-05-24 PROBLEM — C78.00: Status: RESOLVED | Noted: 2023-08-11 | Resolved: 2025-05-24

## 2025-05-24 PROBLEM — T82.858A STENOSIS OF ARTERIOVENOUS DIALYSIS FISTULA: Status: RESOLVED | Noted: 2017-02-17 | Resolved: 2025-05-24

## 2025-05-24 PROBLEM — I5A NON-ISCHEMIC MYOCARDIAL INJURY (NON-TRAUMATIC): Status: ACTIVE | Noted: 2017-07-21

## 2025-05-24 PROBLEM — K91.840: Status: ACTIVE | Noted: 2025-05-24

## 2025-05-24 PROBLEM — R63.4 WEIGHT LOSS: Status: RESOLVED | Noted: 2023-10-10 | Resolved: 2025-05-24

## 2025-05-24 PROBLEM — E83.52 HYPERCALCEMIA: Status: RESOLVED | Noted: 2022-04-04 | Resolved: 2025-05-24

## 2025-05-24 PROBLEM — R49.0 HOARSENESS OF VOICE: Status: RESOLVED | Noted: 2023-09-21 | Resolved: 2025-05-24

## 2025-05-24 PROBLEM — C64.9: Status: RESOLVED | Noted: 2023-08-11 | Resolved: 2025-05-24

## 2025-05-24 PROBLEM — E86.1 HYPOTENSION DUE TO HYPOVOLEMIA: Status: ACTIVE | Noted: 2025-05-24

## 2025-05-24 PROBLEM — H93.8X3 SENSATION OF FULLNESS IN BOTH EARS: Status: RESOLVED | Noted: 2023-09-21 | Resolved: 2025-05-24

## 2025-05-24 LAB
ABSOLUTE EOSINOPHIL (OHS): 0.45 K/UL
ABSOLUTE MONOCYTE (OHS): 0.59 K/UL (ref 0.3–1)
ABSOLUTE NEUTROPHIL COUNT (OHS): 2.5 K/UL (ref 1.8–7.7)
ALBUMIN SERPL BCP-MCNC: 3.2 G/DL (ref 3.5–5.2)
ALLENS TEST: ABNORMAL
ALP SERPL-CCNC: 1122 UNIT/L (ref 40–150)
ALT SERPL W/O P-5'-P-CCNC: 23 UNIT/L (ref 10–44)
ANION GAP (OHS): 19 MMOL/L (ref 8–16)
APTT PPP: 30.2 SECONDS (ref 21–32)
AST SERPL-CCNC: 38 UNIT/L (ref 11–45)
BASOPHILS # BLD AUTO: 0.04 K/UL
BASOPHILS NFR BLD AUTO: 0.7 %
BILIRUB SERPL-MCNC: 0.8 MG/DL (ref 0.1–1)
BNP SERPL-MCNC: 587 PG/ML (ref 0–99)
BUN SERPL-MCNC: 23 MG/DL (ref 6–20)
CALCIUM SERPL-MCNC: 7.2 MG/DL (ref 8.7–10.5)
CHLORIDE SERPL-SCNC: 93 MMOL/L (ref 95–110)
CO2 SERPL-SCNC: 24 MMOL/L (ref 23–29)
CREAT SERPL-MCNC: 4.4 MG/DL (ref 0.5–1.4)
CTP QC/QA: YES
CTP QC/QA: YES
DELSYS: ABNORMAL
ERYTHROCYTE [DISTWIDTH] IN BLOOD BY AUTOMATED COUNT: 15.5 % (ref 11.5–14.5)
GFR SERPLBLD CREATININE-BSD FMLA CKD-EPI: 11 ML/MIN/1.73/M2
GLUCOSE SERPL-MCNC: 102 MG/DL (ref 70–110)
HCT VFR BLD AUTO: 28.5 % (ref 37–48.5)
HGB BLD-MCNC: 8.9 GM/DL (ref 12–16)
IMM GRANULOCYTES # BLD AUTO: 0.04 K/UL (ref 0–0.04)
IMM GRANULOCYTES NFR BLD AUTO: 0.7 % (ref 0–0.5)
INDIRECT COOMBS: NORMAL
INR PPP: 1.1 (ref 0.8–1.2)
LACTATE SERPL-SCNC: 1.9 MMOL/L (ref 0.5–2.2)
LDH SERPL L TO P-CCNC: 5.34 MMOL/L (ref 0.5–2.2)
LYMPHOCYTES # BLD AUTO: 1.9 K/UL (ref 1–4.8)
MAGNESIUM SERPL-MCNC: 2.2 MG/DL (ref 1.6–2.6)
MCH RBC QN AUTO: 30.6 PG (ref 27–31)
MCHC RBC AUTO-ENTMCNC: 31.2 G/DL (ref 32–36)
MCV RBC AUTO: 98 FL (ref 82–98)
MODE: ABNORMAL
NUCLEATED RBC (/100WBC) (OHS): 0 /100 WBC
PHOSPHATE SERPL-MCNC: 4.7 MG/DL (ref 2.7–4.5)
PLATELET # BLD AUTO: 198 K/UL (ref 150–450)
PMV BLD AUTO: 10.3 FL (ref 9.2–12.9)
POC MOLECULAR INFLUENZA A AGN: NEGATIVE
POC MOLECULAR INFLUENZA B AGN: NEGATIVE
POTASSIUM SERPL-SCNC: 4.2 MMOL/L (ref 3.5–5.1)
PROCALCITONIN SERPL-MCNC: 0.84 NG/ML
PROCALCITONIN SERPL-MCNC: 0.9 NG/ML
PROT SERPL-MCNC: 8.3 GM/DL (ref 6–8.4)
PROTHROMBIN TIME: 12 SECONDS (ref 9–12.5)
RBC # BLD AUTO: 2.91 M/UL (ref 4–5.4)
RELATIVE EOSINOPHIL (OHS): 8.2 %
RELATIVE LYMPHOCYTE (OHS): 34.4 % (ref 18–48)
RELATIVE MONOCYTE (OHS): 10.7 % (ref 4–15)
RELATIVE NEUTROPHIL (OHS): 45.3 % (ref 38–73)
RH BLD: NORMAL
SAMPLE: ABNORMAL
SARS-COV-2 RDRP RESP QL NAA+PROBE: NEGATIVE
SITE: ABNORMAL
SODIUM SERPL-SCNC: 136 MMOL/L (ref 136–145)
SPECIMEN OUTDATE: NORMAL
TROPONIN I SERPL DL<=0.01 NG/ML-MCNC: 0.14 NG/ML
TROPONIN I SERPL DL<=0.01 NG/ML-MCNC: 0.14 NG/ML
WBC # BLD AUTO: 5.52 K/UL (ref 3.9–12.7)

## 2025-05-24 PROCEDURE — 85730 THROMBOPLASTIN TIME PARTIAL: CPT | Performed by: EMERGENCY MEDICINE

## 2025-05-24 PROCEDURE — 85610 PROTHROMBIN TIME: CPT | Performed by: EMERGENCY MEDICINE

## 2025-05-24 PROCEDURE — 87635 SARS-COV-2 COVID-19 AMP PRB: CPT | Performed by: INTERNAL MEDICINE

## 2025-05-24 PROCEDURE — 84484 ASSAY OF TROPONIN QUANT: CPT | Performed by: EMERGENCY MEDICINE

## 2025-05-24 PROCEDURE — 85025 COMPLETE CBC W/AUTO DIFF WBC: CPT | Performed by: EMERGENCY MEDICINE

## 2025-05-24 PROCEDURE — 84100 ASSAY OF PHOSPHORUS: CPT | Performed by: EMERGENCY MEDICINE

## 2025-05-24 PROCEDURE — 12000002 HC ACUTE/MED SURGE SEMI-PRIVATE ROOM

## 2025-05-24 PROCEDURE — 83735 ASSAY OF MAGNESIUM: CPT | Performed by: EMERGENCY MEDICINE

## 2025-05-24 PROCEDURE — 84484 ASSAY OF TROPONIN QUANT: CPT | Performed by: INTERNAL MEDICINE

## 2025-05-24 PROCEDURE — 96366 THER/PROPH/DIAG IV INF ADDON: CPT

## 2025-05-24 PROCEDURE — 99900035 HC TECH TIME PER 15 MIN (STAT)

## 2025-05-24 PROCEDURE — 93010 ELECTROCARDIOGRAM REPORT: CPT | Mod: ,,, | Performed by: INTERNAL MEDICINE

## 2025-05-24 PROCEDURE — 86850 RBC ANTIBODY SCREEN: CPT | Performed by: EMERGENCY MEDICINE

## 2025-05-24 PROCEDURE — 99291 CRITICAL CARE FIRST HOUR: CPT

## 2025-05-24 PROCEDURE — 93005 ELECTROCARDIOGRAM TRACING: CPT

## 2025-05-24 PROCEDURE — 63600175 PHARM REV CODE 636 W HCPCS: Performed by: PHYSICIAN ASSISTANT

## 2025-05-24 PROCEDURE — 84443 ASSAY THYROID STIM HORMONE: CPT | Performed by: INTERNAL MEDICINE

## 2025-05-24 PROCEDURE — 96365 THER/PROPH/DIAG IV INF INIT: CPT

## 2025-05-24 PROCEDURE — 63600175 PHARM REV CODE 636 W HCPCS: Performed by: EMERGENCY MEDICINE

## 2025-05-24 PROCEDURE — 84145 PROCALCITONIN (PCT): CPT | Performed by: INTERNAL MEDICINE

## 2025-05-24 PROCEDURE — 25000003 PHARM REV CODE 250: Performed by: EMERGENCY MEDICINE

## 2025-05-24 PROCEDURE — 83605 ASSAY OF LACTIC ACID: CPT

## 2025-05-24 PROCEDURE — 83880 ASSAY OF NATRIURETIC PEPTIDE: CPT | Performed by: EMERGENCY MEDICINE

## 2025-05-24 PROCEDURE — 25000003 PHARM REV CODE 250: Performed by: INTERNAL MEDICINE

## 2025-05-24 PROCEDURE — 83605 ASSAY OF LACTIC ACID: CPT | Performed by: EMERGENCY MEDICINE

## 2025-05-24 PROCEDURE — 80053 COMPREHEN METABOLIC PANEL: CPT | Performed by: EMERGENCY MEDICINE

## 2025-05-24 PROCEDURE — 96367 TX/PROPH/DG ADDL SEQ IV INF: CPT

## 2025-05-24 PROCEDURE — 87040 BLOOD CULTURE FOR BACTERIA: CPT | Performed by: EMERGENCY MEDICINE

## 2025-05-24 PROCEDURE — 84145 PROCALCITONIN (PCT): CPT | Performed by: EMERGENCY MEDICINE

## 2025-05-24 RX ORDER — ACETAMINOPHEN 650 MG/20.3ML
650 LIQUID ORAL EVERY 6 HOURS PRN
Status: DISCONTINUED | OUTPATIENT
Start: 2025-05-24 | End: 2025-05-25 | Stop reason: HOSPADM

## 2025-05-24 RX ORDER — LEVOTHYROXINE SODIUM 75 UG/1
75 TABLET ORAL
Status: DISCONTINUED | OUTPATIENT
Start: 2025-05-25 | End: 2025-05-25 | Stop reason: HOSPADM

## 2025-05-24 RX ORDER — NOREPINEPHRINE BITARTRATE/D5W 4MG/250ML
0-3 PLASTIC BAG, INJECTION (ML) INTRAVENOUS CONTINUOUS
Status: DISCONTINUED | OUTPATIENT
Start: 2025-05-24 | End: 2025-05-24

## 2025-05-24 RX ORDER — METOPROLOL SUCCINATE 25 MG/1
25 TABLET, EXTENDED RELEASE ORAL DAILY
Status: DISCONTINUED | OUTPATIENT
Start: 2025-05-25 | End: 2025-05-25 | Stop reason: HOSPADM

## 2025-05-24 RX ORDER — ATORVASTATIN CALCIUM 40 MG/1
40 TABLET, FILM COATED ORAL NIGHTLY
Status: DISCONTINUED | OUTPATIENT
Start: 2025-05-25 | End: 2025-05-25 | Stop reason: HOSPADM

## 2025-05-24 RX ORDER — IBUPROFEN 200 MG
24 TABLET ORAL
Status: CANCELLED | OUTPATIENT
Start: 2025-05-24

## 2025-05-24 RX ORDER — MIRTAZAPINE 7.5 MG/1
7.5 TABLET, FILM COATED ORAL NIGHTLY
Status: DISCONTINUED | OUTPATIENT
Start: 2025-05-25 | End: 2025-05-24

## 2025-05-24 RX ORDER — MIDODRINE HYDROCHLORIDE 5 MG/1
10 TABLET ORAL EVERY 8 HOURS
Status: DISCONTINUED | OUTPATIENT
Start: 2025-05-24 | End: 2025-05-25 | Stop reason: HOSPADM

## 2025-05-24 RX ORDER — GLUCAGON 1 MG
1 KIT INJECTION
Status: CANCELLED | OUTPATIENT
Start: 2025-05-24

## 2025-05-24 RX ORDER — LIDOCAINE HYDROCHLORIDE AND EPINEPHRINE 10; 20 UG/ML; MG/ML
1 INJECTION, SOLUTION INFILTRATION; PERINEURAL ONCE
Status: DISCONTINUED | OUTPATIENT
Start: 2025-05-24 | End: 2025-05-24

## 2025-05-24 RX ORDER — NALOXONE HCL 0.4 MG/ML
0.02 VIAL (ML) INJECTION
Status: CANCELLED | OUTPATIENT
Start: 2025-05-24

## 2025-05-24 RX ORDER — TALC
6 POWDER (GRAM) TOPICAL NIGHTLY PRN
Status: DISCONTINUED | OUTPATIENT
Start: 2025-05-24 | End: 2025-05-25 | Stop reason: HOSPADM

## 2025-05-24 RX ORDER — TRANEXAMIC ACID 1 G/10ML
3000 INJECTION, SOLUTION INTRAVENOUS
Status: DISPENSED | OUTPATIENT
Start: 2025-05-24 | End: 2025-05-25

## 2025-05-24 RX ORDER — IBUPROFEN 200 MG
16 TABLET ORAL
Status: CANCELLED | OUTPATIENT
Start: 2025-05-24

## 2025-05-24 RX ORDER — LIDOCAINE HYDROCHLORIDE AND EPINEPHRINE 10; 20 UG/ML; MG/ML
1 INJECTION, SOLUTION INFILTRATION; PERINEURAL ONCE
Status: COMPLETED | OUTPATIENT
Start: 2025-05-24 | End: 2025-05-24

## 2025-05-24 RX ORDER — SODIUM CHLORIDE 0.9 % (FLUSH) 0.9 %
10 SYRINGE (ML) INJECTION EVERY 12 HOURS PRN
Status: DISCONTINUED | OUTPATIENT
Start: 2025-05-24 | End: 2025-05-25 | Stop reason: HOSPADM

## 2025-05-24 RX ORDER — ONDANSETRON HYDROCHLORIDE 2 MG/ML
4 INJECTION, SOLUTION INTRAVENOUS EVERY 6 HOURS PRN
Status: DISCONTINUED | OUTPATIENT
Start: 2025-05-24 | End: 2025-05-25

## 2025-05-24 RX ORDER — NOREPINEPHRINE BITARTRATE/D5W 4MG/250ML
0-.2 PLASTIC BAG, INJECTION (ML) INTRAVENOUS CONTINUOUS
Status: DISCONTINUED | OUTPATIENT
Start: 2025-05-24 | End: 2025-05-24

## 2025-05-24 RX ADMIN — MIDODRINE HYDROCHLORIDE 10 MG: 5 TABLET ORAL at 08:05

## 2025-05-24 RX ADMIN — SODIUM CHLORIDE 250 ML: 9 INJECTION, SOLUTION INTRAVENOUS at 05:05

## 2025-05-24 RX ADMIN — VANCOMYCIN HYDROCHLORIDE 1250 MG: 1.25 INJECTION, POWDER, LYOPHILIZED, FOR SOLUTION INTRAVENOUS at 07:05

## 2025-05-24 RX ADMIN — LIDOCAINE HYDROCHLORIDE,EPINEPHRINE BITARTRATE 1 ML: 20; .01 INJECTION, SOLUTION INFILTRATION; PERINEURAL at 09:05

## 2025-05-24 RX ADMIN — NOREPINEPHRINE BITARTRATE 0.05 MCG/KG/MIN: 4 INJECTION, SOLUTION INTRAVENOUS at 07:05

## 2025-05-24 RX ADMIN — PIPERACILLIN AND TAZOBACTAM 4.5 G: 4; .5 INJECTION, POWDER, LYOPHILIZED, FOR SOLUTION INTRAVENOUS; PARENTERAL at 06:05

## 2025-05-24 NOTE — Clinical Note
Diagnosis: Screening for cardiovascular condition [518508]   Future Attending Provider: AVERY HAMILTON [[id number]]   Reason for IP Medical Treatment  (Clinical interventions that can only be accomplished in the IP setting? ) :: sepsis, hypotension

## 2025-05-24 NOTE — ED PROVIDER NOTES
Encounter Date: 5/24/2025    SCRIBE #1 NOTE: I, Nichelle Villavicencio, am scribing for, and in the presence of,  Dr. Cassia Price. I have scribed the following portions of the note - Other sections scribed: HPI/ROS/PE.       History     Chief Complaint   Patient presents with    Mouth Bleeding     Pt had a dental procedure on Wednesday and reports continued bleeding to gums. Dialysis patient. Last dialysis today. Full treatment today. Pt appears fatigued in triage. Pt taking blood thinners      Eva Bloom is a 59 y.o. female, with a PMHx of CVA, Afib, CHF(ejection fraction 20-25%), ESRD (dialysis T/R/Sat), who presents to the ED accompanied by her , with intermittent gum bleeding. Additional history is provided by independent historian: pt's  reports pt had 4 teeth removed on her right side 3 upper teeth and 1 lower tooth on Wednesday. Pt was started on penicillin and tylenol 500  mg. Since then the pt has gum bleeding into gauze which last for a while when she does bleed. Pt was given Eliquis after dialysis today. Her  reports he wasn't given any instructions about holding Eliquis before or after her dental procedure. Pt had her full dialysis appointment they removed close to 2 liters today. Pt feels tired  due to lack of sleep and can't eat or drink.  Pt has had a blood transfusion before and if needed she will get one while in the hospital.No other exacerbating or alleviating factors. Denies fever, chest pain, shortness of breath other associated symptoms. Atorvastatin, levothyroxine, metoprolol, mirtazapine, Eliquis 2.5, aspirin daily medications. Patient denies Et OH, tobacco, or illicit drug use. Allopurinol drug allergies .      The history is provided by the patient and the spouse.     Review of patient's allergies indicates:   Allergen Reactions    Carvedilol Other (See Comments)     Nausea/vomiting    Allopurinol      Other reaction(s): abnormal transaminases     Past Medical History:    Diagnosis Date    Acute metabolic encephalopathy 01/02/2024    Anemia     Anticoagulant long-term use     Atrial fibrillation     Bronchitis 03/01/2017    Cancer 2016    kidney cancer    CHF (congestive heart failure), NYHA class II, chronic, systolic     CMV (cytomegalovirus) antibody positive     CVA (cerebral vascular accident) 01/03/2024    Encounter for blood transfusion     ESRD (end stage renal disease)     Essential hypertension 09/23/2015    H/O herpes simplex type 2 infection     Herpes simplex type 1 antibody positive     History of kidney cancer     s/p left nephrectomy 1/2016    Hyperparathyroidism, unspecified     Hyperuricemia without signs of inflammatory arthritis and tophaceous disease     Kidney stones     LGSIL (low grade squamous intraepithelial dysplasia)     Myocardiopathy 07/21/2017    Prediabetes     Proteinuria     Renal disorder     Thyroid nodule     Urate nephropathy      Past Surgical History:   Procedure Laterality Date    ANGIOGRAPHY OF LOWER EXTREMITY Right 11/11/2024    Procedure: Angiogram Extremity Unilateral;  Surgeon: Corby Casey MD;  Location: [facility] OR [address] [place] [address];  Service: Vascular;  Laterality: Right;  mGy 63.61      Gycm2   11.8042        Fluro time  13.5min      Contrast amount 40ml    ANGIOPLASTY, PERIPHERAL BLOOD VESSEL Right 11/11/2024    Procedure: ANGIOPLASTY, PERIPHERAL BLOOD VESSEL;  Surgeon: Corby Casey MD;  Location: [facility] OR [address] [place] [address];  Service: Vascular;  Laterality: Right;    BREAST CYST EXCISION      COLONOSCOPY N/A 11/12/2015    COLONOSCOPY N/A 03/12/2021    Procedure: COLONOSCOPY;  Surgeon: Brendon Lanier MD;  Location: [facility] ENDO;  Service: Endoscopy;  Laterality: N/A;  covid test 3/9, labs prior, prep instr mailed -ml    EXCISION OF ARTERIOVENOUS FISTULA Left 09/27/2023    Procedure: EXCISION, AV FISTULA;  Surgeon: FARIDEH Muñoz III, MD;  Location: [address];  Service: Vascular;  Laterality: Left;  LUE AV graft excision     INSERTION OF BIVENTRICULAR IMPLANTABLE CARDIOVERTER-DEFIBRILLATOR (ICD)  04/2021    INSERTION OF TUNNELED CENTRAL VENOUS CATHETER (CVC) WITH SUBCUTANEOUS PORT N/A 01/25/2023    Procedure: INSERTION, DUAL LUMEN CATHETER WITH PORT, WITH IMAGING GUIDANCE;  Surgeon: FARIDEH Muñoz III, MD;  Location: [facility] OR [address] [place] [address];  Service: Peripheral Vascular;  Laterality: N/A;  possinble permcath placment    NEPHRECTOMY-LAPAROSCOPIC Left 01/12/2016    PERCUTANEOUS TRANSLUMINAL ANGIOPLASTY OF ARTERIOVENOUS FISTULA Left 04/19/2023    Procedure: PTA, AV FISTULA;  Surgeon: FARIDEH Muñoz III, MD;  Location: [facility] CATH LAB;  Service: Peripheral Vascular;  Laterality: Left;    PERITONEAL CATHETER INSERTION      Permacath insertion  01/12/2017    PLACEMENT OF ARTERIOVENOUS GRAFT  12/28/2022    Procedure: INSERTION, GRAFT, ARTERIOVENOUS;  Surgeon: FARIDEH Muñoz III, MD;  [facility];  Service: Peripheral Vascular;;    REMOVAL, GRAFT, ARTERIOVENOUS, UPPER EXTREMITY Left 01/25/2023    Procedure: REMOVAL, GRAFT, ARTERIOVENOUS, UPPER EXTREMITY;  Surgeon: FARIDEH Muñoz III, MD;  [facility];  Service: Peripheral Vascular;  Laterality: Left;    REVISION OF ARTERIOVENOUS FISTULA Left 05/01/2023    Procedure: REVISION, AV FISTULA;  Surgeon: FARIDEH Muñoz III, MD;  [facility];  Service: Peripheral Vascular;  Laterality: Left;  LUE AVG revision    REVISION OF PROCEDURE INVOLVING ARTERIOVENOUS GRAFT Left 12/28/2022    Procedure: REVISION, PROCEDURE INVOLVING ARTERIOVENOUS GRAFT;  Surgeon: FARIDEH Muñoz III, MD;  [facility];  Service: Peripheral Vascular;  Laterality: Left;    REVISION OF PROCEDURE INVOLVING ARTERIOVENOUS GRAFT Left 07/21/2023    Procedure: LEFT ARM ARTERIOVENOUS GRAFT EXCISION  AND LIGATION;  Surgeon: Obi Werner MD;  Location: [facility];  Service: Vascular;  Laterality: Left;  Left AVG excision and revision with interposition    SALPINGOOPHORECTOMY Right  2016    KJB---REJIINCSHIRLENE    TONSILLECTOMY      TUBAL LIGATION       Family History   Problem Relation Name Age of Onset    Hypertension Mother      Diabetes Father      Kidney disease Father      No Known Problems Sister Sophy     Heart disease Sister Maria A     No Known Problems Sister Claudine     No Known Problems Brother Dax     No Known Problems Brother Don     Cataracts Maternal Aunt      No Known Problems Maternal Uncle      No Known Problems Paternal Aunt      No Known Problems Paternal Uncle      No Known Problems Maternal Grandmother      Diabetes Maternal Grandfather      No Known Problems Paternal Grandmother      No Known Problems Paternal Grandfather      No Known Problems Son      No Known Problems Son      No Known Problems Other      Breast cancer Neg Hx      Colon cancer Neg Hx      Cancer Neg Hx      Stroke Neg Hx      Amblyopia Neg Hx      Blindness Neg Hx      Glaucoma Neg Hx      Macular degeneration Neg Hx      Retinal detachment Neg Hx      Strabismus Neg Hx      Thyroid disease Neg Hx       Social History[1]  Review of Systems   Constitutional:  Positive for appetite change (decreased) and fatigue. Negative for chills, diaphoresis and fever.   HENT:  Positive for dental problem (right-sided gum bleeding).    Eyes:  Negative for photophobia and visual disturbance.   Respiratory:  Negative for cough and shortness of breath.    Cardiovascular:  Negative for chest pain and leg swelling.   Gastrointestinal:  Negative for abdominal pain, blood in stool, constipation, diarrhea, nausea and vomiting.   Genitourinary:  Negative for dysuria, flank pain, frequency, hematuria and urgency.   Musculoskeletal:  Negative for neck pain and neck stiffness.   Skin:  Negative for rash and wound.   Neurological:  Negative for weakness, light-headedness, numbness and headaches.   Psychiatric/Behavioral:  Negative for confusion and suicidal ideas.    All other systems reviewed and are negative.      Physical Exam      Initial Vitals [05/24/25 1701]   BP Pulse Resp Temp SpO2   (!) 70/46 (!) 59 16 99 °F (37.2 °C) 100 %      MAP       --         Physical Exam    Nursing note and vitals reviewed.  Constitutional: She appears well-developed and well-nourished. She is not diaphoretic. No distress.   Fatigue, slightly dry mucus membranes.   HENT:   Head: Normocephalic and atraumatic. Mouth/Throat: Oropharynx is clear and moist. No oropharyngeal exudate.   Blood clot to upper right side with no active bleeding.   Eyes: EOM are normal. Pupils are equal, round, and reactive to light. Right eye exhibits no discharge. Left eye exhibits no discharge.   Subconjunctival pallor.   Neck: Neck supple. No JVD present.   Normal range of motion.  Cardiovascular:  Normal rate, regular rhythm, normal heart sounds and intact distal pulses.     Exam reveals no gallop and no friction rub.       No murmur heard.  Pulmonary/Chest: Breath sounds normal. No respiratory distress. She has no wheezes. She has no rhonchi. She has no rales.   Abdominal: Abdomen is soft. Bowel sounds are normal. She exhibits no distension. There is no abdominal tenderness. There is no rebound and no guarding.   Musculoskeletal:         General: No tenderness or edema.      Cervical back: Normal range of motion and neck supple.     Lymphadenopathy:     She has no cervical adenopathy.   Neurological: She is alert and oriented to person, place, and time. No cranial nerve deficit.   Skin: Skin is warm and dry.   Dialysis catheter to chest wall. Old A-V fistula to left arm. Decubitus on left heel. Dry gangrenous toes bilaterally without signs of infection.     Psychiatric: She has a normal mood and affect. Thought content normal.         ED Course   Critical Care    Date/Time: 5/24/2025 10:27 PM    Performed by: Cassia Price MD  Authorized by: Leonardo Robledo MD  Direct patient critical care time: 45 minutes  Ordering / reviewing critical care time: 10 minutes  Documentation  critical care time: 10 minutes  Consulting other physicians critical care time: 10 minutes  Total critical care time (exclusive of procedural time) : 75 minutes  Critical care was necessary to treat or prevent imminent or life-threatening deterioration of the following conditions: sepsis.  Critical care was time spent personally by me on the following activities: development of treatment plan with patient or surrogate, discussions with consultants, interpretation of cardiac output measurements, evaluation of patient's response to treatment, examination of patient, obtaining history from patient or surrogate, ordering and performing treatments and interventions, ordering and review of laboratory studies, ordering and review of radiographic studies, pulse oximetry, re-evaluation of patient's condition and review of old charts.        Labs Reviewed   COMPREHENSIVE METABOLIC PANEL - Abnormal       Result Value    Sodium 136      Potassium 4.2      Chloride 93 (*)     CO2 24      Glucose 102      BUN 23 (*)     Creatinine 4.4 (*)     Calcium 7.2 (*)     Protein Total 8.3      Albumin 3.2 (*)     Bilirubin Total 0.8      ALP 1,122 (*)     AST 38      ALT 23      Anion Gap 19 (*)     eGFR 11 (*)    PHOSPHORUS - Abnormal    Phosphorus Level 4.7 (*)    B-TYPE NATRIURETIC PEPTIDE - Abnormal     (*)    TROPONIN I - Abnormal    Troponin-I 0.143 (*)    PROCALCITONIN - Abnormal    Procalcitonin 0.90 (*)    CBC WITH DIFFERENTIAL - Abnormal    WBC 5.52      RBC 2.91 (*)     HGB 8.9 (*)     HCT 28.5 (*)     MCV 98      MCH 30.6      MCHC 31.2 (*)     RDW 15.5 (*)     Platelet Count 198      MPV 10.3      Nucleated RBC 0      Neut % 45.3      Lymph % 34.4      Mono % 10.7      Eos % 8.2 (*)     Basophil % 0.7      Imm Grans % 0.7 (*)     Neut # 2.50      Lymph # 1.90      Mono # 0.59      Eos # 0.45      Baso # 0.04      Imm Grans # 0.04     PROCALCITONIN - Abnormal    Procalcitonin 0.84 (*)    TROPONIN I - Abnormal     Troponin-I 0.144 (*)    ISTAT LACTATE - Abnormal    POC Lactate 5.34 (*)     Sample VENOUS      Site Other      Allens Test N/A      DelSys Room Air      Mode SPONT     LACTIC ACID, PLASMA - Normal    Lactic Acid Level 1.9      Narrative:     Falsely low lactic acid results can be found in samples containing >=13.0 mg/dL total bilirubin and/or >=3.5 mg/dL direct bilirubin.    MAGNESIUM - Normal    Magnesium  2.2     PROTIME-INR - Normal    PT 12.0      INR 1.1     APTT - Normal    PTT 30.2     CULTURE, BLOOD   CULTURE, BLOOD   CBC W/ AUTO DIFFERENTIAL    Narrative:     The following orders were created for panel order CBC auto differential.  Procedure                               Abnormality         Status                     ---------                               -----------         ------                     CBC with Differential[[id number]]       Abnormal            Final result                 Please view results for these tests on the individual orders.   URINALYSIS, REFLEX TO URINE CULTURE   LACTIC ACID, PLASMA   TYPE & SCREEN    Specimen Outdate 05/27/2025 23:59      Group & Rh A POS      Indirect Ananya NEG            Imaging Results               X-Ray Chest AP Portable (Final result)  Result time 05/24/25 21:34:32      Final result by Gael Hare MD (05/24/25 21:34:32)                   Impression:      1. Cardiomegaly with nonspecific mild diffuse interstitial changes.  Mild edema is a consideration.  2. Expansile lytic mass in the distal left clavicle could represent metastatic disease.  Additionally there is a rib fracture posteriorly on the right at the 7th rib with limited visualization.  Pathologic fracture is not excluded.  Follow-up recommended.  3. This report was flagged in Epic as abnormal.      Electronically signed by: Gael Hare  Date:    05/24/2025  Time:    21:34               Narrative:    EXAMINATION:  XR CHEST AP PORTABLE    CLINICAL HISTORY:  Sepsis;    TECHNIQUE:  Single  frontal view of the chest was performed.    COMPARISON:  04/15/2024    FINDINGS:  Cardiac silhouette is enlarged.  Left IJ central venous catheter with tip overlying right atrium similar to the prior study.    Cardiac defibrillator device on the left.    No large effusion no evidence of pneumothorax.    Minimal diffuse nonspecific interstitial changes, similar to the prior study.    Expansile lytic mass in the distal left clavicle could represent metastatic disease.    There is a rib fracture posteriorly on the right at the 7th rib with limited visualization.  Pathologic fracture is not excluded.    Recommend follow-up                                       Medications   piperacillin-tazobactam (ZOSYN) 4.5 g in D5W 100 mL IVPB (MB+) (0 g Intravenous Stopped 5/24/25 1915)   sodium chloride 0.9% bolus 250 mL 250 mL (0 mLs Intravenous Hold 5/24/25 1845)   midodrine tablet 10 mg (10 mg Oral Given 5/24/25 2051)   NORepinephrine 4 mg in dextrose 5% 250 mL infusion (premix) (0 mcg/kg/min × 54.4 kg Intravenous Hold 5/24/25 2045)   tranexamic acid injection Soln 3,000 mg (3,000 mg Topical (Top) Not Given 5/24/25 2045)   sodium chloride 0.9% flush 10 mL (has no administration in time range)   acetaminophen oral solution 650 mg (has no administration in time range)   ondansetron injection 4 mg (has no administration in time range)   melatonin tablet 6 mg (has no administration in time range)   atorvastatin tablet 40 mg (has no administration in time range)   levothyroxine tablet 75 mcg (has no administration in time range)   metoprolol succinate (TOPROL-XL) 24 hr tablet 25 mg (has no administration in time range)   mirtazapine tablet 7.5 mg (has no administration in time range)   vancomycin 1,250 mg in 0.9% NaCl 250 mL IVPB (admixture device) (0 mg Intravenous Stopped 5/24/25 2055)   sodium chloride 0.9% bolus 250 mL 250 mL (0 mLs Intravenous Stopped 5/24/25 1804)   LIDOcaine 2%/EPINEPHrine 1:100,000 injection 1 mL (1 mL Other  Given by Provider 5/24/25 2130)     Medical Decision Making  Amount and/or Complexity of Data Reviewed  Independent Historian: spouse     Details: See HPI   Labs: ordered. Decision-making details documented in ED Course.  Radiology: ordered. Decision-making details documented in ED Course.  ECG/medicine tests: ordered and independent interpretation performed. Decision-making details documented in ED Course.    Risk  Decision regarding hospitalization.    MDM  59-year-old female with past history of end-stage renal disease on dialysis received her full dialysis today, recent dental procedure with removal of multiple teeth on Wednesday on penicillin, CHF, AFib, anemia presents with initial complaint of mouth bleeding. Patient states since Wednesday her mouth has been intermittently bleeding. She also reports significant fatigue. She is noted to have initial heart rate in the low 70s and blood pressure in the 60s over 40s. She states they took 1-2 L off today. She did have a wet cough on exam.  Differential diagnosis includes was not limited to sepsis, dehydration, metabolic derangement, thrombocytopenia, metabolic derangement.  We gave her 250 mL IV fluid which initially improved her blood pressure however then decreased again. She did require some slight oxygen. Her blood pressure right now is 93/51. With a map of 67. Heart rate is 73. I have ordered Levophed if her map lowers and she is getting her antibiotics at this time which will be an additional 350 mL of IV fluid. She is not having any active bleeding of her gums right now but does have a clot at the area of her previous dental removal. Lactate is elevated at 5.3. Creatinine at 4.4 with normal potassium. Alk-phos is significantly elevated at 1100 however she has had elevated alk-phos in the past. Her bilirubin is normal. She has no abdominal pain. Coags within normal limits. Platelets within normal limits. BNP is 587 which is lower than her previous. Troponin  at her baseline at 0.143. She denies any chest pain. Procalcitonin elevated at 0.9. No leukocytosis. Hemoglobin at 8.9 her baseline is around 9.5-10. We would like to bring patient into ICU given low blood pressures and suspect sepsis from recent dental work with bacteremia.  Case discussed with Dr. Skelton who agrees with admission.  Initially ICU as patient is on Levophed however we are able to wean her off the Levophed.  She had repeat oozing from her molars.  The clots were removed and Surgicel as well as lidocaine with epi was placed with improvement of her bleeding.  We will continue to monitor.    This patient does have evidence of infective focus  My overall impression is sepsis.  Source: dental  Antibiotics given-   Antibiotics (72h ago, onward)      Start     Stop Route Frequency Ordered    05/24/25 1745  piperacillin-tazobactam (ZOSYN) 4.5 g in D5W 100 mL IVPB (MB+)  (Sepsis Workup)         05/25/25 1744 IV Every 8 hours (non-standard times) 05/24/25 1731          Latest lactate reviewed-  Recent Labs   Lab 05/24/25  1746 05/24/25  2127   LACTATE  --  1.9   POCLAC 5.34*  --      Organ dysfunction indicated by none    Fluid challenge Contraindicated- Fluid bolus is contraindicated in this patient due to End Stage Renal Disease and Congestive Heart Failure     Post- resuscitation assessment Yes - I attest a sepsis perfusion exam was performed within 6 hours of sepsis, severe sepsis, or septic shock presentation, following fluid resuscitation.      Will Start Pressors- Levophed for MAP of 65  Source control achieved by: abx             Scribe Attestation:   Scribe #1: I performed the above scribed service and the documentation accurately describes the services I performed. I attest to the accuracy of the note.        ED Course as of 05/24/25 2229   Sat May 24, 2025   2024 EKG 12-lead  Normal sinus at 77.  No ST elevation or significant depression.  T-wave inversions in V5 and V6 as well as 1 and aVL.  T-wave  flattening.  QTC is prolonged at 6:02 a.m..  T-wave inversions were previously present in November of 2024. [JT]      ED Course User Index  [JT] Cassia Price MD                       I, Cassia Price, personally performed the services described in this documentation. All medical record entries made by the scribe were at my direction and in my presence. I have reviewed the chart and agree that the record reflects my personal performance and is accurate and complete.      DISCLAIMER: This note was prepared with [employer] voice recognition transcription software. Garbled syntax, mangled pronouns, and other bizarre constructions may be attributed to that software system.      Clinical Impression:  Final diagnoses:  [Z13.6] Screening for cardiovascular condition  [R94.31] Prolonged Q-T interval on ECG (Primary)  [I95.9] Hypotension, unspecified hypotension type  [A41.9] Sepsis, due to unspecified organism, unspecified whether acute organ dysfunction present  [K06.8] Gums, bleeding  [A41.9] Sepsis          ED Disposition Condition    Admit                       [1]   Social History  Tobacco Use    Smoking status: Never     Passive exposure: Never    Smokeless tobacco: Never   Substance Use Topics    Alcohol use: No    Drug use: Not Currently     Types: Hydrocodone        Cassia Price MD  05/24/25 1869

## 2025-05-25 VITALS
SYSTOLIC BLOOD PRESSURE: 116 MMHG | BODY MASS INDEX: 21.42 KG/M2 | RESPIRATION RATE: 16 BRPM | DIASTOLIC BLOOD PRESSURE: 70 MMHG | HEART RATE: 76 BPM | WEIGHT: 125.44 LBS | OXYGEN SATURATION: 95 % | TEMPERATURE: 98 F | HEIGHT: 64 IN

## 2025-05-25 LAB
ABSOLUTE EOSINOPHIL (OHS): 0.37 K/UL
ABSOLUTE MONOCYTE (OHS): 0.59 K/UL (ref 0.3–1)
ABSOLUTE NEUTROPHIL COUNT (OHS): 2.45 K/UL (ref 1.8–7.7)
ANION GAP (OHS): 20 MMOL/L (ref 8–16)
BASOPHILS # BLD AUTO: 0.03 K/UL
BASOPHILS NFR BLD AUTO: 0.6 %
BUN SERPL-MCNC: 32 MG/DL (ref 6–20)
CALCIUM SERPL-MCNC: 6.6 MG/DL (ref 8.7–10.5)
CHLORIDE SERPL-SCNC: 93 MMOL/L (ref 95–110)
CO2 SERPL-SCNC: 23 MMOL/L (ref 23–29)
CREAT SERPL-MCNC: 5.2 MG/DL (ref 0.5–1.4)
ERYTHROCYTE [DISTWIDTH] IN BLOOD BY AUTOMATED COUNT: 15.4 % (ref 11.5–14.5)
FOLATE SERPL-MCNC: 8.2 NG/ML (ref 4–24)
GFR SERPLBLD CREATININE-BSD FMLA CKD-EPI: 9 ML/MIN/1.73/M2
GLUCOSE SERPL-MCNC: 229 MG/DL (ref 70–110)
HCT VFR BLD AUTO: 19.9 % (ref 37–48.5)
HGB BLD-MCNC: 6.2 GM/DL (ref 12–16)
IMM GRANULOCYTES # BLD AUTO: 0.06 K/UL (ref 0–0.04)
IMM GRANULOCYTES NFR BLD AUTO: 1.2 % (ref 0–0.5)
LYMPHOCYTES # BLD AUTO: 1.62 K/UL (ref 1–4.8)
MAGNESIUM SERPL-MCNC: 2 MG/DL (ref 1.6–2.6)
MCH RBC QN AUTO: 31 PG (ref 27–31)
MCHC RBC AUTO-ENTMCNC: 31.2 G/DL (ref 32–36)
MCV RBC AUTO: 100 FL (ref 82–98)
NUCLEATED RBC (/100WBC) (OHS): 0 /100 WBC
PLATELET # BLD AUTO: 136 K/UL (ref 150–450)
PMV BLD AUTO: 9.4 FL (ref 9.2–12.9)
POTASSIUM SERPL-SCNC: 4.1 MMOL/L (ref 3.5–5.1)
PROCALCITONIN SERPL-MCNC: 0.8 NG/ML
RBC # BLD AUTO: 2 M/UL (ref 4–5.4)
RELATIVE EOSINOPHIL (OHS): 7.2 %
RELATIVE LYMPHOCYTE (OHS): 31.6 % (ref 18–48)
RELATIVE MONOCYTE (OHS): 11.5 % (ref 4–15)
RELATIVE NEUTROPHIL (OHS): 47.9 % (ref 38–73)
SODIUM SERPL-SCNC: 136 MMOL/L (ref 136–145)
TROPONIN I SERPL DL<=0.01 NG/ML-MCNC: 0.12 NG/ML
TSH SERPL-ACNC: 0.62 UIU/ML (ref 0.4–4)
VANCOMYCIN SERPL-MCNC: 24.7 UG/ML (ref ?–80)
VIT B12 SERPL-MCNC: 854 PG/ML (ref 210–950)
WBC # BLD AUTO: 5.12 K/UL (ref 3.9–12.7)

## 2025-05-25 PROCEDURE — 80048 BASIC METABOLIC PNL TOTAL CA: CPT | Performed by: INTERNAL MEDICINE

## 2025-05-25 PROCEDURE — 82607 VITAMIN B-12: CPT | Performed by: STUDENT IN AN ORGANIZED HEALTH CARE EDUCATION/TRAINING PROGRAM

## 2025-05-25 PROCEDURE — 36415 COLL VENOUS BLD VENIPUNCTURE: CPT | Performed by: INTERNAL MEDICINE

## 2025-05-25 PROCEDURE — 63600175 PHARM REV CODE 636 W HCPCS: Performed by: STUDENT IN AN ORGANIZED HEALTH CARE EDUCATION/TRAINING PROGRAM

## 2025-05-25 PROCEDURE — 86920 COMPATIBILITY TEST SPIN: CPT | Performed by: STUDENT IN AN ORGANIZED HEALTH CARE EDUCATION/TRAINING PROGRAM

## 2025-05-25 PROCEDURE — 93005 ELECTROCARDIOGRAM TRACING: CPT

## 2025-05-25 PROCEDURE — 25000003 PHARM REV CODE 250: Performed by: STUDENT IN AN ORGANIZED HEALTH CARE EDUCATION/TRAINING PROGRAM

## 2025-05-25 PROCEDURE — 84484 ASSAY OF TROPONIN QUANT: CPT | Performed by: INTERNAL MEDICINE

## 2025-05-25 PROCEDURE — 83735 ASSAY OF MAGNESIUM: CPT | Performed by: INTERNAL MEDICINE

## 2025-05-25 PROCEDURE — 84145 PROCALCITONIN (PCT): CPT | Performed by: INTERNAL MEDICINE

## 2025-05-25 PROCEDURE — 80202 ASSAY OF VANCOMYCIN: CPT | Performed by: STUDENT IN AN ORGANIZED HEALTH CARE EDUCATION/TRAINING PROGRAM

## 2025-05-25 PROCEDURE — 93010 ELECTROCARDIOGRAM REPORT: CPT | Mod: ,,, | Performed by: INTERNAL MEDICINE

## 2025-05-25 PROCEDURE — 82746 ASSAY OF FOLIC ACID SERUM: CPT | Performed by: STUDENT IN AN ORGANIZED HEALTH CARE EDUCATION/TRAINING PROGRAM

## 2025-05-25 PROCEDURE — 85025 COMPLETE CBC W/AUTO DIFF WBC: CPT | Performed by: INTERNAL MEDICINE

## 2025-05-25 PROCEDURE — 25000003 PHARM REV CODE 250: Performed by: INTERNAL MEDICINE

## 2025-05-25 RX ORDER — LIDOCAINE HYDROCHLORIDE AND EPINEPHRINE 10; 20 UG/ML; MG/ML
10 INJECTION, SOLUTION INFILTRATION; PERINEURAL ONCE
Status: DISCONTINUED | OUTPATIENT
Start: 2025-05-25 | End: 2025-05-25 | Stop reason: HOSPADM

## 2025-05-25 RX ORDER — CALCIUM GLUCONATE 20 MG/ML
1 INJECTION, SOLUTION INTRAVENOUS
Status: DISPENSED | OUTPATIENT
Start: 2025-05-25 | End: 2025-05-25

## 2025-05-25 RX ORDER — HYDROCODONE BITARTRATE AND ACETAMINOPHEN 500; 5 MG/1; MG/1
TABLET ORAL
Status: DISCONTINUED | OUTPATIENT
Start: 2025-05-25 | End: 2025-05-25 | Stop reason: HOSPADM

## 2025-05-25 RX ADMIN — MIDODRINE HYDROCHLORIDE 10 MG: 5 TABLET ORAL at 06:05

## 2025-05-25 RX ADMIN — LEVOTHYROXINE SODIUM 75 MCG: 75 TABLET ORAL at 06:05

## 2025-05-25 RX ADMIN — METOPROLOL SUCCINATE 25 MG: 25 TABLET, EXTENDED RELEASE ORAL at 09:05

## 2025-05-25 RX ADMIN — PIPERACILLIN AND TAZOBACTAM 4.5 G: 4; .5 INJECTION, POWDER, LYOPHILIZED, FOR SOLUTION INTRAVENOUS; PARENTERAL at 06:05

## 2025-05-25 NOTE — ASSESSMENT & PLAN NOTE
No indication for HD tonight. F/u on lytes and consult Nephrology is not d/c in 24-36 hours for routine HD eval.

## 2025-05-25 NOTE — PLAN OF CARE
Case Management Final Discharge Note    Discharge Disposition: Patient is transferring to [facility].    New DME ordered / company name: none    Relevant SDOH / Transition of Care Barriers:  none    Person available to provide assistance at home when needed and their contact information: Otis Bloom (Spouse)  [phone] (Mobile)     Scheduled followup appointment: PCP on 5/30/25 @ 8:30 added to AVS    Referrals placed: none    Transportation: family      Patient and family educated on discharge services and updated on DC plan. Bedside RN notified, patient clear to discharge from Case Management Perspective.      05/25/25 1122   Final Note   Assessment Type Final Discharge Note   Anticipated Discharge Disposition Home   What phone number can be called within the next 1-3 days to see how you are doing after discharge? [phone]   Hospital Resources/Appts/Education Provided Appointments scheduled and added to AVS   Post-Acute Status   Discharge Delays None known at this time

## 2025-05-25 NOTE — PHARMACY MED REC
"Admission Medication History     The home medication history was taken by Olivia Grey.    You may go to "Admission" then "Reconcile Home Medications" tabs to review and/or act upon these items.     The home medication list has been updated by the Pharmacy department.   Please read ALL comments highlighted in yellow.   Please address this information as you see fit.    Feel free to contact us if you have any questions or require assistance.      Medications listed below were obtained from: Patient/family and Analytic software- [employer]  (Not in a hospital admission)          Olivia Grey  [phone]                 .          "

## 2025-05-25 NOTE — NURSING
Pt has left with [facility] ambulance to be transferred to [place] [address]. Report was completed.

## 2025-05-25 NOTE — H&P
[facility]  [facility]  History & Physical    Patient Name: Eva Bloom  MRN: [id number]  Patient Class: IP- Inpatient  Admission Date: 5/24/2025  Attending Physician: Dr. Estela Skelton   Primary Care Provider: Sowmya Mccain MD  Patient information was obtained from patient, past medical records, and ER records.     Subjective:     Principal Problem:Mouth bleeding s/p tooth extraction on Wednesday     Chief Complaint:   Chief Complaint   Patient presents with    Mouth Bleeding     Pt had a dental procedure on Wednesday and reports continued bleeding to gums. Dialysis patient. Last dialysis today. Full treatment today. Pt appears fatigued in triage. Pt taking blood thinners         HPI: 59 y.o. AAF with h/o ESRD on HD via left tunneled HD catheter (TTS at [facility] DIALYSIS  follows with Dr. Motley), paroxsymal afib (on eliquis 2.5mg PO BID), Systolic/Diastolic CHF (with AICD. EF 20-25% in April 2024), essential HTN, HLD, Hypothyrodisim, PAD (complicated by bilateral dry gangrene/heel ulcers->follows with vascular and wound care) presents to the ER with continued bleeding from her extracted teeth sites. NO fevers or chills. States had taken her ASA and Eliquis prior to, day of and days after her teeth being extracted. Had 3 on the upper right removed and 1 on the lower right removed on Wednesday. Has been bleeding on and off since then. Only on liquid diet right now. Went to HD on Thursday and today and had about 2L removed.     In the ED was noted to have SBP initially in the 70-80/40-50s NSR.   Started on IVF bolus (500ml total) with periopheral levophed.   Labs with WBC 5.5 and H/H 8.9/28 (baseline) plt 198. INR 1.1.  Chemistry c/w ESRD with no indications for emergent HD tonight.    and Troponin 0.143. Procal 0.9 and POCT lactic 5.34.     Started on IV abx for possible bacteremia/sepsis due to Dental extraction. We have been consulted for further management.     Went  to bedside with ED provider who removed the clot attached to the upper right tooth socket. Suction and lidocane/epi given initially. Eval 30mins later still with oozing but improved. TXA soaked surgicel applied. BP after 10mg PO Midodrine, IVF bolus and IVF with abx improved and was weaned off of the levophed and stable for >30mins. Able to be monitored on the floor with tele.     Past Medical History:   Diagnosis Date    Acute metabolic encephalopathy 01/02/2024    Anemia     Anticoagulant long-term use     Atrial fibrillation     Bronchitis 03/01/2017    Cancer 2016    kidney cancer    CHF (congestive heart failure), NYHA class II, chronic, systolic     CMV (cytomegalovirus) antibody positive     CVA (cerebral vascular accident) 01/03/2024    Encounter for blood transfusion     ESRD (end stage renal disease)     Essential hypertension 09/23/2015    H/O herpes simplex type 2 infection     Herpes simplex type 1 antibody positive     History of kidney cancer     s/p left nephrectomy 1/2016    Hyperparathyroidism, unspecified     Hyperuricemia without signs of inflammatory arthritis and tophaceous disease     Kidney stones     LGSIL (low grade squamous intraepithelial dysplasia)     Myocardiopathy 07/21/2017    Prediabetes     Proteinuria     Renal disorder     Thyroid nodule     Urate nephropathy        Past Surgical History:   Procedure Laterality Date    ANGIOGRAPHY OF LOWER EXTREMITY Right 11/11/2024    Procedure: Angiogram Extremity Unilateral;  Surgeon: Corby Casey MD;  Location: [facility] OR [address] [place] [address];  Service: Vascular;  Laterality: Right;  mGy 63.61      Gycm2   11.8042        Fluro time  13.5min      Contrast amount 40ml    ANGIOPLASTY, PERIPHERAL BLOOD VESSEL Right 11/11/2024    Procedure: ANGIOPLASTY, PERIPHERAL BLOOD VESSEL;  Surgeon: Corby Casey MD;  Location: [facility] OR [address] [place] [address];  Service: Vascular;  Laterality: Right;    BREAST CYST EXCISION      COLONOSCOPY N/A 11/12/2015    COLONOSCOPY N/A  03/12/2021    Procedure: COLONOSCOPY;  Surgeon: Brendon Lanier MD;  Location: [facility] ENDO;  Service: Endoscopy;  Laterality: N/A;  covid test 3/9, labs prior, prep instr mailed -ml    EXCISION OF ARTERIOVENOUS FISTULA Left 09/27/2023    Procedure: EXCISION, AV FISTULA;  Surgeon: FARIDEH Muñoz III, MD;  Location: [facility] OR [facility] FLR;  Service: Vascular;  Laterality: Left;  LUE AV graft excision    INSERTION OF BIVENTRICULAR IMPLANTABLE CARDIOVERTER-DEFIBRILLATOR (ICD)  04/2021    INSERTION OF TUNNELED CENTRAL VENOUS CATHETER (CVC) WITH SUBCUTANEOUS PORT N/A 01/25/2023    Procedure: INSERTION, DUAL LUMEN CATHETER WITH PORT, WITH IMAGING GUIDANCE;  Surgeon: FARIDEH Muñoz III, MD;  Location: [facility] OR [facility] FLR;  Service: Peripheral Vascular;  Laterality: N/A;  possinble permcath placment    NEPHRECTOMY-LAPAROSCOPIC Left 01/12/2016    PERCUTANEOUS TRANSLUMINAL ANGIOPLASTY OF ARTERIOVENOUS FISTULA Left 04/19/2023    Procedure: PTA, AV FISTULA;  Surgeon: FARIDEH Muñoz III, MD;  Location: [facility] CATH LAB;  Service: Peripheral Vascular;  Laterality: Left;    PERITONEAL CATHETER INSERTION      Permacath insertion  01/12/2017    PLACEMENT OF ARTERIOVENOUS GRAFT  12/28/2022    Procedure: INSERTION, GRAFT, ARTERIOVENOUS;  Surgeon: FARIDEH Muñoz III, MD;  [facility];  Service: Peripheral Vascular;;    REMOVAL, GRAFT, ARTERIOVENOUS, UPPER EXTREMITY Left 01/25/2023    Procedure: REMOVAL, GRAFT, ARTERIOVENOUS, UPPER EXTREMITY;  Surgeon: FARIDEH Muñoz III, MD;  Location: [facility] OR [facility] FLR;  Service: Peripheral Vascular;  Laterality: Left;    REVISION OF ARTERIOVENOUS FISTULA Left 05/01/2023    Procedure: REVISION, AV FISTULA;  Surgeon: FARIDEH Muñoz III, MD;  Location: [facility] OR 2ND FLR;  Service: Peripheral Vascular;  Laterality: Left;  LUE AVG revision    REVISION OF PROCEDURE INVOLVING ARTERIOVENOUS GRAFT Left 12/28/2022    Procedure: REVISION, PROCEDURE INVOLVING ARTERIOVENOUS GRAFT;  Surgeon: FARIDEH Muñoz  III, MD;  [facility];  Service: Peripheral Vascular;  Laterality: Left;    REVISION OF PROCEDURE INVOLVING ARTERIOVENOUS GRAFT Left 07/21/2023    Procedure: LEFT ARM ARTERIOVENOUS GRAFT EXCISION  AND LIGATION;  Surgeon: Obi Werner MD;  Location: [facility] OR;  Service: Vascular;  Laterality: Left;  Left AVG excision and revision with interposition    SALPINGOOPHORECTOMY Right 2016    KJB---DAVINCI    TONSILLECTOMY      TUBAL LIGATION         Review of patient's allergies indicates:   Allergen Reactions    Carvedilol Other (See Comments)     Nausea/vomiting    Allopurinol      Other reaction(s): abnormal transaminases       Current Facility-Administered Medications on File Prior to Encounter   Medication    0.9%  NaCl infusion     Current Outpatient Medications on File Prior to Encounter   Medication Sig    apixaban (ELIQUIS) 2.5 mg Tab Take 1 tablet (2.5 mg total) by mouth 2 (two) times daily.    aspirin (ECOTRIN) 81 MG EC tablet Take 1 tablet (81 mg total) by mouth once daily.    atorvastatin (LIPITOR) 40 MG tablet TAKE 1 TABLET BY MOUTH EVERY DAY    levothyroxine (SYNTHROID) 75 MCG tablet Take 1 tablet (75 mcg total) by mouth once daily.    metoprolol succinate (TOPROL-XL) 25 MG 24 hr tablet TAKE 1 TABLET BY MOUTH EVERY DAY    mirtazapine (REMERON) 7.5 MG Tab Take 1 tablet (7.5 mg total) by mouth every evening.    acetaminophen (TYLENOL) 500 MG tablet Take 1 tablet (500 mg total) by mouth every 4 (four) hours as needed for Pain or Temperature greater than (100.5 or greater).    lidocaine-prilocaine (EMLA) cream APPLY ATLEAST 30 MINUTES BEFORE TREATMENT 3 TIMES A WEEK    naloxone (NARCAN) 1 mg/mL injection 2 mg (1 mg per nostril) by Nasal route as needed for opioid overdose; may repeat in 3 to 5 minutes if not effective. Call 911    [DISCONTINUED] amLODIPine (NORVASC) 5 MG tablet TAKE 1 TABLET BY MOUTH EVERY DAY     Family History       Problem Relation (Age of Onset)    Cataracts Maternal  Aunt    Diabetes Father, Maternal Grandfather    Heart disease Sister    Hypertension Mother    Kidney disease Father    No Known Problems Sister, Sister, Brother, Brother, Maternal Uncle, Paternal Aunt, Paternal Uncle, Maternal Grandmother, Paternal Grandmother, Paternal Grandfather, Son, Son, Other          Tobacco Use    Smoking status: Never     Passive exposure: Never    Smokeless tobacco: Never   Substance and Sexual Activity    Alcohol use: No    Drug use: Not Currently     Types: Hydrocodone    Sexual activity: Not Currently     Review of Systems   Constitutional:  Positive for appetite change (due to teeth extraction). Negative for activity change, chills, diaphoresis, fatigue and fever.   HENT:  Negative for congestion and sore throat.    Respiratory:  Positive for cough. Negative for choking, shortness of breath and wheezing.    Cardiovascular:  Negative for chest pain, palpitations and leg swelling.   Gastrointestinal:  Negative for abdominal pain, constipation, diarrhea, nausea and vomiting.   Genitourinary:  Negative for dysuria.   Musculoskeletal:  Negative for arthralgias, gait problem and myalgias.   Neurological:  Negative for dizziness, syncope, light-headedness and headaches.   Psychiatric/Behavioral:  The patient is not nervous/anxious.      Objective:     Vital Signs (Most Recent):  Temp: 99 °F (37.2 °C) (05/24/25 1701)  Pulse: 73 (05/24/25 2200)  Resp: (!) 21 (05/24/25 2200)  BP: 115/63 (05/24/25 2200)  SpO2: 96 % (05/24/25 2200) Vital Signs (24h Range):  Temp:  [99 °F (37.2 °C)] 99 °F (37.2 °C)  Pulse:  [59-79] 73  Resp:  [16-24] 21  SpO2:  [88 %-100 %] 96 %  BP: ()/(46-70) 115/63     Weight: 54.4 kg (120 lb)  Body mass index is 20.6 kg/m².     Physical Exam  Vitals and nursing note reviewed.   Constitutional:       Appearance: Normal appearance. She is not ill-appearing, toxic-appearing or diaphoretic.      Comments: Frail/thin.   HENT:      Head: Normocephalic and atraumatic.       Nose: Nose normal. No congestion or rhinorrhea.      Mouth/Throat:      Mouth: Mucous membranes are moist.      Comments: Oozing from the upper teeth extraction site (originally had a large clot attached that was removed by the ED providers and packed with TXA/Surgicel) NO purulent drainage noted   Eyes:      General: No scleral icterus.     Extraocular Movements: Extraocular movements intact.      Conjunctiva/sclera: Conjunctivae normal.      Pupils: Pupils are equal, round, and reactive to light.   Cardiovascular:      Rate and Rhythm: Normal rate and regular rhythm.      Pulses: Normal pulses.      Heart sounds: Murmur heard.      No friction rub. No gallop.   Pulmonary:      Effort: Pulmonary effort is normal. No respiratory distress.      Breath sounds: Normal breath sounds. No wheezing, rhonchi or rales.   Abdominal:      General: Bowel sounds are normal. There is no distension.      Palpations: Abdomen is soft.      Tenderness: There is no abdominal tenderness. There is no guarding or rebound.   Musculoskeletal:         General: Deformity (noted dry gangrene bilaterally on toes. Foul smelling.  Left heel with healing ulcer) present. No swelling. Normal range of motion.      Cervical back: Normal range of motion and neck supple.      Right lower leg: No edema.      Left lower leg: No edema.   Skin:     General: Skin is warm.      Comments: Left tunneled HD catheter in place.    Neurological:      Mental Status: She is alert.              CRANIAL NERVES     CN III, IV, VI   Pupils are equal, round, and reactive to light.       Recent Results (from the past 24 hours)   Comprehensive metabolic panel    Collection Time: 05/24/25  5:35 PM   Result Value Ref Range    Sodium 136 136 - 145 mmol/L    Potassium 4.2 3.5 - 5.1 mmol/L    Chloride 93 (L) 95 - 110 mmol/L    CO2 24 23 - 29 mmol/L    Glucose 102 70 - 110 mg/dL    BUN 23 (H) 6 - 20 mg/dL    Creatinine 4.4 (H) 0.5 - 1.4 mg/dL    Calcium 7.2 (L) 8.7 - 10.5 mg/dL     Protein Total 8.3 6.0 - 8.4 gm/dL    Albumin 3.2 (L) 3.5 - 5.2 g/dL    Bilirubin Total 0.8 0.1 - 1.0 mg/dL    ALP 1,122 (H) 40 - 150 unit/L    AST 38 11 - 45 unit/L    ALT 23 10 - 44 unit/L    Anion Gap 19 (H) 8 - 16 mmol/L    eGFR 11 (L) >60 mL/min/1.73/m2   Magnesium    Collection Time: 05/24/25  5:35 PM   Result Value Ref Range    Magnesium  2.2 1.6 - 2.6 mg/dL   Phosphorus    Collection Time: 05/24/25  5:35 PM   Result Value Ref Range    Phosphorus Level 4.7 (H) 2.7 - 4.5 mg/dL   Protime-INR    Collection Time: 05/24/25  5:35 PM   Result Value Ref Range    PT 12.0 9.0 - 12.5 seconds    INR 1.1 0.8 - 1.2   APTT    Collection Time: 05/24/25  5:35 PM   Result Value Ref Range    PTT 30.2 21.0 - 32.0 seconds   Brain natriuretic peptide    Collection Time: 05/24/25  5:35 PM   Result Value Ref Range     (H) 0 - 99 pg/mL   Troponin I    Collection Time: 05/24/25  5:35 PM   Result Value Ref Range    Troponin-I 0.143 (H) <=0.026 ng/mL   Procalcitonin    Collection Time: 05/24/25  5:35 PM   Result Value Ref Range    Procalcitonin 0.90 (H) <0.25 ng/mL   CBC with Differential    Collection Time: 05/24/25  5:35 PM   Result Value Ref Range    WBC 5.52 3.90 - 12.70 K/uL    RBC 2.91 (L) 4.00 - 5.40 M/uL    HGB 8.9 (L) 12.0 - 16.0 gm/dL    HCT 28.5 (L) 37.0 - 48.5 %    MCV 98 82 - 98 fL    MCH 30.6 27.0 - 31.0 pg    MCHC 31.2 (L) 32.0 - 36.0 g/dL    RDW 15.5 (H) 11.5 - 14.5 %    Platelet Count 198 150 - 450 K/uL    MPV 10.3 9.2 - 12.9 fL    Nucleated RBC 0 <=0 /100 WBC    Neut % 45.3 38 - 73 %    Lymph % 34.4 18 - 48 %    Mono % 10.7 4 - 15 %    Eos % 8.2 (H) <=8 %    Basophil % 0.7 <=1.9 %    Imm Grans % 0.7 (H) 0.0 - 0.5 %    Neut # 2.50 1.8 - 7.7 K/uL    Lymph # 1.90 1 - 4.8 K/uL    Mono # 0.59 0.3 - 1 K/uL    Eos # 0.45 <=0.5 K/uL    Baso # 0.04 <=0.2 K/uL    Imm Grans # 0.04 0.00 - 0.04 K/uL   ISTAT Lactate    Collection Time: 05/24/25  5:46 PM   Result Value Ref Range    POC Lactate 5.34 (HH) 0.5 - 2.2 mmol/L     Sample VENOUS     Site Other     Allens Test N/A     DelSys Room Air     Mode SPONT    Type & Screen    Collection Time: 05/24/25  6:14 PM   Result Value Ref Range    Specimen Outdate 05/27/2025 23:59     Group & Rh A POS     Indirect Ananya NEG    Lactic acid, plasma #2    Collection Time: 05/24/25  9:27 PM   Result Value Ref Range    Lactic Acid Level 1.9 0.5 - 2.2 mmol/L   Procalcitonin    Collection Time: 05/24/25  9:27 PM   Result Value Ref Range    Procalcitonin 0.84 (H) <0.25 ng/mL   Troponin I    Collection Time: 05/24/25  9:27 PM   Result Value Ref Range    Troponin-I 0.144 (H) <=0.026 ng/mL       Microbiology Results (last 7 days)       Procedure Component Value Units Date/Time    Blood culture x two cultures. Draw prior to antibiotics. [[id number]] Collected: 05/24/25 1743    Order Status: Resulted Specimen: Blood from Peripheral, Forearm, Right Updated: 05/24/25 1750    Blood culture x two cultures. Draw prior to antibiotics. [[id number]] Collected: 05/24/25 1735    Order Status: Resulted Specimen: Blood from Peripheral, Hand, Right Updated: 05/24/25 1744            Imaging Results               X-Ray Chest AP Portable (Final result)  Result time 05/24/25 21:34:32      Final result by Gael Hare MD (05/24/25 21:34:32)                   Impression:      1. Cardiomegaly with nonspecific mild diffuse interstitial changes.  Mild edema is a consideration.  2. Expansile lytic mass in the distal left clavicle could represent metastatic disease.  Additionally there is a rib fracture posteriorly on the right at the 7th rib with limited visualization.  Pathologic fracture is not excluded.  Follow-up recommended.  3. This report was flagged in Epic as abnormal.      Electronically signed by: Gael Hare  Date:    05/24/2025  Time:    21:34               Narrative:    EXAMINATION:  XR CHEST AP PORTABLE    CLINICAL HISTORY:  Sepsis;    TECHNIQUE:  Single frontal view of the chest was  performed.    COMPARISON:  04/15/2024    FINDINGS:  Cardiac silhouette is enlarged.  Left IJ central venous catheter with tip overlying right atrium similar to the prior study.    Cardiac defibrillator device on the left.    No large effusion no evidence of pneumothorax.    Minimal diffuse nonspecific interstitial changes, similar to the prior study.    Expansile lytic mass in the distal left clavicle could represent metastatic disease.    There is a rib fracture posteriorly on the right at the 7th rib with limited visualization.  Pathologic fracture is not excluded.    Recommend follow-up                                                Assessment/Plan:     Assessment & Plan  Bleeding post tooth extraction  Due to continued use of ASA 81mg daily  and Eliquis 2.5mg PO BID (last taken this am). Working with ED to slow the bleeding. Cont. To monitor. NO indication for Kcentra or transfusion at this time. HOLD ASA an Eliquis. Suction at bedside. NPO except for meds tonight. Patients most recent Hgb, Hct, platelets, and INR are listed below.  Recent Labs     05/24/25  1735   HGB 8.9*   HCT 28.5*      INR 1.1     Monitor on tele   Hypotension due to hypovolemia  Improved with IV bolus and will cont. Midodrine for now 10mg PO TID (Hold for SBP>110). Initially elevated lactic acid suspect possible lab error with repeat now wnl. Procal still elevated 0.84 and will cont. With IV abx and f/u cultures. Low suspicion at this time for sepsis but will monitor for developing bacteremia s/p tooth extraction.   PAF (paroxysmal atrial fibrillation)  Patient has paroxysmal (<7 days) atrial fibrillation. Patient is currently in sinus rhythm. [id number] Score: 2. The patients heart rate in the last 24 hours is as follows:  Pulse  Min: 59  Max: 79     Antiarrhythmics  metoprolol succinate (TOPROL-XL) 24 hr tablet 25 mg, Daily, Oral    Anticoagulants   Eliquis 2.5mg PO BID     Plan  - Replete lytes with a goal of K>4, Mg >2  -  Patient is anticoagulated, see medications listed above. BUT will hold for now given continued bleeding from tooth extraction thalia.   - Patient's afib is currently controlled    ESRD (end stage renal disease) on dialysis  No indication for HD tonight. F/u on lytes and consult Nephrology is not d/c in 24-36 hours for routine HD eval.   Chronic combined systolic and diastolic congestive heart failure  Patient has Combined Systolic and Diastolic heart failure that is Chronic. On presentation their CHF was well compensated. Most recent BNP and echo results are listed below.  Recent Labs     05/24/25  1735   *     Latest ECHO  Results for orders placed during the hospital encounter of 07/05/24    Echo    Interpretation Summary    Left Ventricle: The left ventricle is normal in size. Moderately increased wall thickness. There is moderate concentric hypertrophy. Severe global hypokinesis present. There is severely reduced systolic function with a visually estimated ejection fraction of 20 - 25%.    Right Ventricle: Moderate right ventricular enlargement. Systolic function is moderately reduced.    Aortic Valve: The aortic valve is a trileaflet valve. Mildly restricted motion. There is moderate stenosis. Aortic valve area by VTI is 0.86 cm². Aortic valve peak velocity is 2.34 m/s. Mean gradient is 12 mmHg. The dimensionless index is 0.31. Cannot exclude pseudostenosis due to low EF.    Mitral Valve: There is moderate bileaflet sclerosis. There is mild stenosis. The mean pressure gradient across the mitral valve is 3 mmHg at a heart rate of 80 bpm. There is moderate regurgitation with an eccentric jet.    Tricuspid Valve: There is severe regurgitation with an eccentrically directed jet.    Pulmonic Valve: There is moderate regurgitation.    Pulmonary Artery: The estimated pulmonary artery systolic pressure is 64 mmHg.    Pericardium: There is a moderate posterior effusion. No indication of cardiac  tamponade.    Current Heart Failure Medications  metoprolol succinate (TOPROL-XL) 24 hr tablet 25 mg, Daily, Oral    Plan  - Monitor strict I&Os and daily weights.    - Place on telemetry  - Low sodium diet when diet resumed.   - Place on fluid restriction of 1.5 L.     PAD (peripheral artery disease)  Follows with vascular and wound care as outpatient. Will consult wound care while here.     Non-ischemic myocardial injury (non-traumatic)  Elevated troponin levels x2 but no c/o chest pain. Relatively unchanged troponin from initial to repeat. Suspect due to hypotension and poor renal clearance. NSR and NO STEMI on EKG. Noted prolonged qt but no lab abnormalities noted that would worsening this. She is on remeron and this can cause QT prolongation. Will hold for now. F/u on TSH . Cont. To monitor for now. F/u repeat troponin level and EKG.     VTE Risk Mitigation (From admission, onward)           Ordered     IP VTE HIGH RISK PATIENT  Once         05/24/25 2104     Place sequential compression device  Until discontinued         05/24/25 2104     Reason for No Pharmacological VTE Prophylaxis  Once        Comments: Actively bleeding   Question:  Reasons:  Answer:  Physician Provided (leave comment)    05/24/25 2104                CODE STATUS: FULL CODE          Estela Skelton MD  Department of Hospital Medicine  [facility] - Emergency Dept

## 2025-05-25 NOTE — HPI
59 y.o. AAF with h/o ESRD on HD via left tunneled HD catheter (TTS at [facility] DIALYS[facility]  follows with Dr. Motley), paroxsymal afib (on eliquis 2.5mg PO BID), Systolic/Diastolic CHF (with AICD. EF 20-25% in April 2024), essential HTN, HLD, Hypothyrodisim, PAD (complicated by bilateral dry gangrene/heel ulcers->follows with vascular and wound care) presents to the ER with continued bleeding from her extracted teeth sites. NO fevers or chills. States had taken her ASA and Eliquis prior to, day of and days after her teeth being extracted. Had 3 on the upper right removed and 1 on the lower right removed on Wednesday. Has been bleeding on and off since then. Only on liquid diet right now. Went to HD on Thursday and today and had about 2L removed.     In the ED was noted to have SBP initially in the 70-80/40-50s NSR.   Started on IVF bolus (500ml total) with periopheral levophed.   Labs with WBC 5.5 and H/H 8.9/28 (baseline) plt 198. INR 1.1.  Chemistry c/w ESRD with no indications for emergent HD tonight.    and Troponin 0.143. Procal 0.9 and POCT lactic 5.34.     Started on IV abx for possible bacteremia/sepsis due to Dental extraction. We have been consulted for further management.     Went to bedside with ED provider who removed the clot attached to the upper right tooth socket. Suction and lidocane/epi given initially. Eval 30mins later still with oozing but improved. TXA soaked surgicel applied. BP after 10mg PO Midodrine, IVF bolus and IVF with abx improved and was weaned off of the levophed and stable for >30mins. Able to be monitored on the floor with tele.

## 2025-05-25 NOTE — PROGRESS NOTES
"Pharmacokinetic Initial Assessment: IV Vancomycin    Assessment/Plan:    Initiate intravenous vancomycin with loading dose of 1250 mg once with subsequent doses when random concentrations are less than 20 mcg/mL  Desired empiric serum trough concentration is 10 to 20 mcg/mL  Draw vancomycin random level on 5/25/25 at 0400.  Pharmacy will continue to follow and monitor vancomycin.      Please contact pharmacy at extension [phone] with any questions regarding this assessment.     Thank you for the consult,   Miguel De Los Santos       Patient brief summary:  Eva Bloom is a 59 y.o. female initiated on antimicrobial therapy with IV Vancomycin for treatment of suspected bacteremia    Drug Allergies:   Review of patient's allergies indicates:   Allergen Reactions    Carvedilol Other (See Comments)     Nausea/vomiting    Allopurinol      Other reaction(s): abnormal transaminases       Actual Body Weight:   54.4 kg    Renal Function:   Estimated Creatinine Clearance: 11.7 mL/min (A) (based on SCr of 4.4 mg/dL (H)).,     Dialysis Method (if applicable):  intermittent HD    CBC (last 72 hours):  Recent Labs   Lab Result Units 05/24/25  1735   WBC K/uL 5.52   HGB gm/dL 8.9*   HCT % 28.5*   Platelet Count K/uL 198   Lymph % % 34.4   Mono % % 10.7   Eos % % 8.2*   Basophil % % 0.7       Metabolic Panel (last 72 hours):  Recent Labs   Lab Result Units 05/24/25  1735   Sodium mmol/L 136   Potassium mmol/L 4.2   Chloride mmol/L 93*   CO2 mmol/L 24   Glucose mg/dL 102   BUN mg/dL 23*   Creatinine mg/dL 4.4*   Albumin g/dL 3.2*   Bilirubin Total mg/dL 0.8   ALP unit/L 1,122*   AST unit/L 38   ALT unit/L 23   Magnesium  mg/dL 2.2   Phosphorus Level mg/dL 4.7*       Drug levels (last 3 results):  No results for input(s): "VANCOMYCINRA", "VANCORANDOM", "VANCOMYCINPE", "VANCOPEAK", "VANCOMYCINTR", "VANCOTROUGH" in the last 72 hours.    Microbiologic Results:  Microbiology Results (last 7 days)       Procedure Component Value Units " Date/Time    Blood culture x two cultures. Draw prior to antibiotics. [[id number]] Collected: 05/24/25 1743    Order Status: Resulted Specimen: Blood from Peripheral, Forearm, Right Updated: 05/24/25 1750    Blood culture x two cultures. Draw prior to antibiotics. [[id number]] Collected: 05/24/25 1735    Order Status: Resulted Specimen: Blood from Peripheral, Hand, Right Updated: 05/24/25 1744

## 2025-05-25 NOTE — PLAN OF CARE
Case Management Assessment     PCP: Sowmya Mccain  Pharmacy: [facility]    Patient Arrived From: home   Existing Help at Home: spouse    Barriers to Discharge: none    Discharge Plan:    A. Home with family   B. Home with family    Patient receives dialysis at [facility] on Tues, Thurs, Sat.     05/25/25 1104   Discharge Assessment   Assessment Type Discharge Planning Assessment   Confirmed/corrected address, phone number and insurance Yes   Confirmed Demographics Correct on Facesheet   Source of Information patient   Communicated MAVIS with patient/caregiver Date not available/Unable to determine   People in Home spouse   Name(s) of People in Home Otis Bloom (Spouse)  [phone] (Mobile)   Do you expect to return to your current living situation? Yes   Do you have help at home or someone to help you manage your care at home? Yes   Who are your caregiver(s) and their phone number(s)? Otis Bloom (Spouse)  [phone] (Mobile)   Prior to hospitilization cognitive status: Alert/Oriented   Current cognitive status: Alert/Oriented   Walking or Climbing Stairs Difficulty yes   Walking or Climbing Stairs ambulation difficulty, requires equipment   Mobility Management rolling walker   Dressing/Bathing Difficulty no   Equipment Currently Used at Home walker, rolling   Readmission within 30 days? No   Patient currently being followed by outpatient case management? No   Do you currently have service(s) that help you manage your care at home? No   Do you take prescription medications? Yes   Do you have prescription coverage? Yes   Do you have any problems affording any of your prescribed medications? No   Is the patient taking medications as prescribed? yes   Who is going to help you get home at discharge? Otis Bloom (Spouse)  [phone] (Mobile)   How do you get to doctors appointments? family or friend will provide   Are you on dialysis? No   Do you take coumadin? No   Discharge Plan A Home with  family   Discharge Plan B Home with family   DME Needed Upon Discharge  none   Discharge Plan discussed with: Patient   Transition of Care Barriers None

## 2025-05-25 NOTE — ASSESSMENT & PLAN NOTE
Elevated troponin levels x2 but no c/o chest pain. Relatively unchanged troponin from initial to repeat. Suspect due to hypotension and poor renal clearance. NSR and NO STEMI on EKG. Noted prolonged qt but no lab abnormalities noted that would worsening this. She is on remeron and this can cause QT prolongation. Will hold for now. F/u on TSH . Cont. To monitor for now. F/u repeat troponin level and EKG.

## 2025-05-25 NOTE — NURSING
Pt admitted to room 333 from ED.  Pt was accompanied by transporter and .  Pt assisted x 1 staff to bed.  Pt left heel healed.  Pt left great toe necrotic, pt left second and third toe necrotic, pt right great toe necrotic and pt right second toe necrotic, these are currently listed LDA sheet.

## 2025-05-25 NOTE — ASSESSMENT & PLAN NOTE
Patient has Combined Systolic and Diastolic heart failure that is Chronic. On presentation their CHF was well compensated. Most recent BNP and echo results are listed below.  Recent Labs     05/24/25  1735   *     Latest ECHO  Results for orders placed during the hospital encounter of 07/05/24    Echo    Interpretation Summary    Left Ventricle: The left ventricle is normal in size. Moderately increased wall thickness. There is moderate concentric hypertrophy. Severe global hypokinesis present. There is severely reduced systolic function with a visually estimated ejection fraction of 20 - 25%.    Right Ventricle: Moderate right ventricular enlargement. Systolic function is moderately reduced.    Aortic Valve: The aortic valve is a trileaflet valve. Mildly restricted motion. There is moderate stenosis. Aortic valve area by VTI is 0.86 cm². Aortic valve peak velocity is 2.34 m/s. Mean gradient is 12 mmHg. The dimensionless index is 0.31. Cannot exclude pseudostenosis due to low EF.    Mitral Valve: There is moderate bileaflet sclerosis. There is mild stenosis. The mean pressure gradient across the mitral valve is 3 mmHg at a heart rate of 80 bpm. There is moderate regurgitation with an eccentric jet.    Tricuspid Valve: There is severe regurgitation with an eccentrically directed jet.    Pulmonic Valve: There is moderate regurgitation.    Pulmonary Artery: The estimated pulmonary artery systolic pressure is 64 mmHg.    Pericardium: There is a moderate posterior effusion. No indication of cardiac tamponade.    Current Heart Failure Medications  metoprolol succinate (TOPROL-XL) 24 hr tablet 25 mg, Daily, Oral    Plan  - Monitor strict I&Os and daily weights.    - Place on telemetry  - Low sodium diet when diet resumed.   - Place on fluid restriction of 1.5 L.

## 2025-05-25 NOTE — PROGRESS NOTES
Pharmacokinetic Assessment Follow Up: IV Vancomycin    Vancomycin serum concentration assessment(s):    The random level was drawn correctly and can be used to guide therapy at this time. The measurement is above the desired definitive target range of 10 to 20 mcg/mL.    Vancomycin Regimen Plan:  No vanco dose on 5/25  Re-dose when the random level is less than 20 mcg/mL, next level to be drawn at 04:00 on 5/26    Drug levels (last 3 results):  Recent Labs   Lab Result Units 05/25/25  0647   Vancomycin Random ug/ml 24.7       Pharmacy will continue to follow and monitor vancomycin.    Please contact pharmacy at extension [phone] for questions regarding this assessment.    Thank you for the consult,   Trice Matthew       Patient brief summary:  Eva Bloom is a 59 y.o. female initiated on antimicrobial therapy with IV Vancomycin for treatment of sepsis of unknown source    Drug Allergies:   Review of patient's allergies indicates:   Allergen Reactions    Carvedilol Other (See Comments)     Nausea/vomiting    Allopurinol      Other reaction(s): abnormal transaminases       Actual Body Weight:   56.9 kg    Renal Function:   Estimated Creatinine Clearance: 9.9 mL/min (A) (based on SCr of 5.2 mg/dL (H)).,     Dialysis Method (if applicable):  intermittent HD    CBC (last 72 hours):  Recent Labs   Lab Result Units 05/24/25  1735 05/25/25  0634   WBC K/uL 5.52 5.12   HGB gm/dL 8.9* 6.2*   HCT % 28.5* 19.9*   Platelet Count K/uL 198 136*   Lymph % % 34.4 31.6   Mono % % 10.7 11.5   Eos % % 8.2* 7.2   Basophil % % 0.7 0.6       Metabolic Panel (last 72 hours):  Recent Labs   Lab Result Units 05/24/25  1735 05/25/25  0634   Sodium mmol/L 136 136   Potassium mmol/L 4.2 4.1   Chloride mmol/L 93* 93*   CO2 mmol/L 24 23   Glucose mg/dL 102 229*   BUN mg/dL 23* 32*   Creatinine mg/dL 4.4* 5.2*   Albumin g/dL 3.2*  --    Bilirubin Total mg/dL 0.8  --    ALP unit/L 1,122*  --    AST unit/L 38  --    ALT unit/L 23  --    Magnesium   mg/dL 2.2 2.0   Phosphorus Level mg/dL 4.7*  --        Vancomycin Administrations:  vancomycin given in the last 96 hours                     vancomycin 1,250 mg in 0.9% NaCl 250 mL IVPB (admixture device) (mg) 1,250 mg New Bag 05/24/25 1921                    Microbiologic Results:  Microbiology Results (last 7 days)       Procedure Component Value Units Date/Time    Blood culture x two cultures. Draw prior to antibiotics. [[id number]]  (Normal) Collected: 05/24/25 1743    Order Status: Completed Specimen: Blood from Peripheral, Forearm, Right Updated: 05/25/25 0101     Blood Culture No Growth After 6 Hours    Blood culture x two cultures. Draw prior to antibiotics. [[id number]]  (Normal) Collected: 05/24/25 1735    Order Status: Completed Specimen: Blood from Peripheral, Hand, Right Updated: 05/25/25 0006     Blood Culture No Growth After 6 Hours

## 2025-05-25 NOTE — PROGRESS NOTES
Pharmacist Renal Dose Adjustment Note    Eva Bloom is a 59 y.o. female being treated with the medication Zosyn 4.5gm q8h    Patient Data:    Vital Signs (Most Recent):  Temp: 99 °F (37.2 °C) (05/24/25 1701)  Pulse: 70 (05/24/25 2300)  Resp: (!) 21 (05/24/25 2200)  BP: 128/68 (05/24/25 2300)  SpO2: (!) 82 % (05/24/25 2300) Vital Signs (72h Range):  Temp:  [99 °F (37.2 °C)]   Pulse:  [59-79]   Resp:  [16-24]   BP: ()/(46-70)   SpO2:  [82 %-100 %]      Recent Labs   Lab 05/24/25  1735   CREATININE 4.4*     Serum creatinine: 4.4 mg/dL (H) 05/24/25 1735  Estimated creatinine clearance: 11.7 mL/min (A)    Medication:Zosyn 4.5gm q8h ordered.  CrCl 11.7.  As per Renal Dose Adjustment per Pharmacy protocol, Zosyn will be renally dosed at 4.5gm q12h x 24 hrs total as originally ordered.      Pharmacist's Name: Miguel De Los Santos  Pharmacist's Extension: [phone]

## 2025-05-25 NOTE — NURSING
PER handoff received from FELIPE Smith. Pt awake in bed. NAD noted. No c/o pain. Fall and safety precautions maintained. Bed locked in lowest position, with side rails up x2. Call bell and personal items within reach.  at bedside.    [facility]  Nurses Note -- 4 Eyes      5/25/2025       Skin assessed on: Q Shift      [x] No Pressure Injuries Present    [x]Prevention Measures Documented    [] Yes LDA  for Pressure Injury Previously documented     [] Yes New Pressure Injury Discovered   [] LDA for New Pressure Injury Added      Attending RN:  Anuradha Peter RN     Second RN:  FELIPE Smith

## 2025-05-25 NOTE — ASSESSMENT & PLAN NOTE
Improved with IV bolus and will cont. Midodrine for now 10mg PO TID (Hold for SBP>110). Initially elevated lactic acid suspect possible lab error with repeat now wnl. Procal still elevated 0.84 and will cont. With IV abx and f/u cultures. Low suspicion at this time for sepsis but will monitor for developing bacteremia s/p tooth extraction.

## 2025-05-25 NOTE — ASSESSMENT & PLAN NOTE
Patient has paroxysmal (<7 days) atrial fibrillation. Patient is currently in sinus rhythm. [id number] Score: 2. The patients heart rate in the last 24 hours is as follows:  Pulse  Min: 59  Max: 79     Antiarrhythmics  metoprolol succinate (TOPROL-XL) 24 hr tablet 25 mg, Daily, Oral    Anticoagulants   Eliquis 2.5mg PO BID     Plan  - Replete lytes with a goal of K>4, Mg >2  - Patient is anticoagulated, see medications listed above. BUT will hold for now given continued bleeding from tooth extraction thalia.   - Patient's afib is currently controlled

## 2025-05-25 NOTE — SIGNIFICANT EVENT
I have seen this patient, reviewed the history and physical, assessment and plan. I have personally interviewed and examined the patient at bedside and agree with the findings with the following comments/updates:    Patient with nearly 3U of blood, nearly 9-->6  No fluid dilution. (On 250 mL given, ESRD)  Still having ongoing bleeding despite topical TXA (3,000)      Would avoid systemic reversal if possible, since she has Afib do not want her to clot elsewhere  Having trop elevation, suspect from low perfusion  On Zosyn for ?sepsis   Will need to restart her AC for afib too  No Dental or ENT here, will need to tx    Note: Qtc 600, avoid prolongers  Hypocalcemia (even after correction)- giving IV calcium                Danilo Olivier MD        Medical Director        Section Head of Hospital Medicine        Board-Certified Internal Medicine Attending

## 2025-05-25 NOTE — SUBJECTIVE & OBJECTIVE
GI did not call pt   Past Medical History:   Diagnosis Date    Acute metabolic encephalopathy 01/02/2024    Anemia     Anticoagulant long-term use     Atrial fibrillation     Bronchitis 03/01/2017    Cancer 2016    kidney cancer    CHF (congestive heart failure), NYHA class II, chronic, systolic     CMV (cytomegalovirus) antibody positive     CVA (cerebral vascular accident) 01/03/2024    Encounter for blood transfusion     ESRD (end stage renal disease)     Essential hypertension 09/23/2015    H/O herpes simplex type 2 infection     Herpes simplex type 1 antibody positive     History of kidney cancer     s/p left nephrectomy 1/2016    Hyperparathyroidism, unspecified     Hyperuricemia without signs of inflammatory arthritis and tophaceous disease     Kidney stones     LGSIL (low grade squamous intraepithelial dysplasia)     Myocardiopathy 07/21/2017    Prediabetes     Proteinuria     Renal disorder     Thyroid nodule     Urate nephropathy        Past Surgical History:   Procedure Laterality Date    ANGIOGRAPHY OF LOWER EXTREMITY Right 11/11/2024    Procedure: Angiogram Extremity Unilateral;  Surgeon: Corby Casey MD;  Location: [facility] OR [address] [place] [address];  Service: Vascular;  Laterality: Right;  mGy 63.61      Gycm2   11.8042        Fluro time  13.5min      Contrast amount 40ml    ANGIOPLASTY, PERIPHERAL BLOOD VESSEL Right 11/11/2024    Procedure: ANGIOPLASTY, PERIPHERAL BLOOD VESSEL;  Surgeon: Corby Casey MD;  Location: [facility] OR [address] [place] [address];  Service: Vascular;  Laterality: Right;    BREAST CYST EXCISION      COLONOSCOPY N/A 11/12/2015    COLONOSCOPY N/A 03/12/2021    Procedure: COLONOSCOPY;  Surgeon: Brendon Lanier MD;  Location: [facility];  Service: Endoscopy;  Laterality: N/A;  covid test 3/9, labs prior, prep instr mailed -ml    EXCISION OF ARTERIOVENOUS FISTULA Left 09/27/2023    Procedure: EXCISION, AV FISTULA;  Surgeon: FARIDEH Muñoz III, MD;  Location: [facility] OR [address] [place] [address];  Service: Vascular;  Laterality: Left;  PEDROE BERTHA  graft excision    INSERTION OF BIVENTRICULAR IMPLANTABLE CARDIOVERTER-DEFIBRILLATOR (ICD)  04/2021    INSERTION OF TUNNELED CENTRAL VENOUS CATHETER (CVC) WITH SUBCUTANEOUS PORT N/A 01/25/2023    Procedure: INSERTION, DUAL LUMEN CATHETER WITH PORT, WITH IMAGING GUIDANCE;  Surgeon: FARIDEH Muñoz III, MD;  Location: [facility] OR [address] [place] [address];  Service: Peripheral Vascular;  Laterality: N/A;  possinble permcath placment    NEPHRECTOMY-LAPAROSCOPIC Left 01/12/2016    PERCUTANEOUS TRANSLUMINAL ANGIOPLASTY OF ARTERIOVENOUS FISTULA Left 04/19/2023    Procedure: PTA, AV FISTULA;  Surgeon: FARIDEH Muñoz III, MD;  Location: [facility] CATH LAB;  Service: Peripheral Vascular;  Laterality: Left;    PERITONEAL CATHETER INSERTION      Permacath insertion  01/12/2017    PLACEMENT OF ARTERIOVENOUS GRAFT  12/28/2022    Procedure: INSERTION, GRAFT, ARTERIOVENOUS;  Surgeon: FARIDEH Muñoz III, MD;  [facility];  Service: Peripheral Vascular;;    REMOVAL, GRAFT, ARTERIOVENOUS, UPPER EXTREMITY Left 01/25/2023    Procedure: REMOVAL, GRAFT, ARTERIOVENOUS, UPPER EXTREMITY;  Surgeon: FARIDEH Muñoz III, MD;  [facility];  Service: Peripheral Vascular;  Laterality: Left;    REVISION OF ARTERIOVENOUS FISTULA Left 05/01/2023    Procedure: REVISION, AV FISTULA;  Surgeon: FARIDEH Muñoz III, MD;  [facility];  Service: Peripheral Vascular;  Laterality: Left;  LUE AVG revision    REVISION OF PROCEDURE INVOLVING ARTERIOVENOUS GRAFT Left 12/28/2022    Procedure: REVISION, PROCEDURE INVOLVING ARTERIOVENOUS GRAFT;  Surgeon: FARIDEH Muñoz III, MD;  [facility];  Service: Peripheral Vascular;  Laterality: Left;    REVISION OF PROCEDURE INVOLVING ARTERIOVENOUS GRAFT Left 07/21/2023    Procedure: LEFT ARM ARTERIOVENOUS GRAFT EXCISION  AND LIGATION;  Surgeon: Obi Werner MD;  Location: [facility];  Service: Vascular;  Laterality: Left;  Left AVG excision and revision with interposition     SALPINGOOPHORECTOMY Right 2016    KJB---DAVINCI    TONSILLECTOMY      TUBAL LIGATION         Review of patient's allergies indicates:   Allergen Reactions    Carvedilol Other (See Comments)     Nausea/vomiting    Allopurinol      Other reaction(s): abnormal transaminases       Current Facility-Administered Medications on File Prior to Encounter   Medication    0.9%  NaCl infusion     Current Outpatient Medications on File Prior to Encounter   Medication Sig    apixaban (ELIQUIS) 2.5 mg Tab Take 1 tablet (2.5 mg total) by mouth 2 (two) times daily.    aspirin (ECOTRIN) 81 MG EC tablet Take 1 tablet (81 mg total) by mouth once daily.    atorvastatin (LIPITOR) 40 MG tablet TAKE 1 TABLET BY MOUTH EVERY DAY    levothyroxine (SYNTHROID) 75 MCG tablet Take 1 tablet (75 mcg total) by mouth once daily.    metoprolol succinate (TOPROL-XL) 25 MG 24 hr tablet TAKE 1 TABLET BY MOUTH EVERY DAY    mirtazapine (REMERON) 7.5 MG Tab Take 1 tablet (7.5 mg total) by mouth every evening.    acetaminophen (TYLENOL) 500 MG tablet Take 1 tablet (500 mg total) by mouth every 4 (four) hours as needed for Pain or Temperature greater than (100.5 or greater).    lidocaine-prilocaine (EMLA) cream APPLY ATLEAST 30 MINUTES BEFORE TREATMENT 3 TIMES A WEEK    naloxone (NARCAN) 1 mg/mL injection 2 mg (1 mg per nostril) by Nasal route as needed for opioid overdose; may repeat in 3 to 5 minutes if not effective. Call 911    [DISCONTINUED] amLODIPine (NORVASC) 5 MG tablet TAKE 1 TABLET BY MOUTH EVERY DAY     Family History       Problem Relation (Age of Onset)    Cataracts Maternal Aunt    Diabetes Father, Maternal Grandfather    Heart disease Sister    Hypertension Mother    Kidney disease Father    No Known Problems Sister, Sister, Brother, Brother, Maternal Uncle, Paternal Aunt, Paternal Uncle, Maternal Grandmother, Paternal Grandmother, Paternal Grandfather, Son, Son, Other          Tobacco Use    Smoking status: Never     Passive exposure: Never     Smokeless tobacco: Never   Substance and Sexual Activity    Alcohol use: No    Drug use: Not Currently     Types: Hydrocodone    Sexual activity: Not Currently     Review of Systems   Constitutional:  Positive for appetite change (due to teeth extraction). Negative for activity change, chills, diaphoresis, fatigue and fever.   HENT:  Negative for congestion and sore throat.    Respiratory:  Positive for cough. Negative for choking, shortness of breath and wheezing.    Cardiovascular:  Negative for chest pain, palpitations and leg swelling.   Gastrointestinal:  Negative for abdominal pain, constipation, diarrhea, nausea and vomiting.   Genitourinary:  Negative for dysuria.   Musculoskeletal:  Negative for arthralgias, gait problem and myalgias.   Neurological:  Negative for dizziness, syncope, light-headedness and headaches.   Psychiatric/Behavioral:  The patient is not nervous/anxious.      Objective:     Vital Signs (Most Recent):  Temp: 99 °F (37.2 °C) (05/24/25 1701)  Pulse: 73 (05/24/25 2200)  Resp: (!) 21 (05/24/25 2200)  BP: 115/63 (05/24/25 2200)  SpO2: 96 % (05/24/25 2200) Vital Signs (24h Range):  Temp:  [99 °F (37.2 °C)] 99 °F (37.2 °C)  Pulse:  [59-79] 73  Resp:  [16-24] 21  SpO2:  [88 %-100 %] 96 %  BP: ()/(46-70) 115/63     Weight: 54.4 kg (120 lb)  Body mass index is 20.6 kg/m².     Physical Exam  Vitals and nursing note reviewed.   Constitutional:       Appearance: Normal appearance. She is not ill-appearing, toxic-appearing or diaphoretic.      Comments: Frail/thin.   HENT:      Head: Normocephalic and atraumatic.      Nose: Nose normal. No congestion or rhinorrhea.      Mouth/Throat:      Mouth: Mucous membranes are moist.      Comments: Oozing from the upper teeth extraction site (originally had a large clot attached that was removed by the ED providers and packed with TXA/Surgicel) NO purulent drainage noted   Eyes:      General: No scleral icterus.     Extraocular Movements: Extraocular  movements intact.      Conjunctiva/sclera: Conjunctivae normal.      Pupils: Pupils are equal, round, and reactive to light.   Cardiovascular:      Rate and Rhythm: Normal rate and regular rhythm.      Pulses: Normal pulses.      Heart sounds: Murmur heard.      No friction rub. No gallop.   Pulmonary:      Effort: Pulmonary effort is normal. No respiratory distress.      Breath sounds: Normal breath sounds. No wheezing, rhonchi or rales.   Abdominal:      General: Bowel sounds are normal. There is no distension.      Palpations: Abdomen is soft.      Tenderness: There is no abdominal tenderness. There is no guarding or rebound.   Musculoskeletal:         General: Deformity (noted dry gangrene bilaterally on toes. Foul smelling.  Left heel with healing ulcer) present. No swelling. Normal range of motion.      Cervical back: Normal range of motion and neck supple.      Right lower leg: No edema.      Left lower leg: No edema.   Skin:     General: Skin is warm.      Comments: Left tunneled HD catheter in place.    Neurological:      Mental Status: She is alert.              CRANIAL NERVES     CN III, IV, VI   Pupils are equal, round, and reactive to light.       Recent Results (from the past 24 hours)   Comprehensive metabolic panel    Collection Time: 05/24/25  5:35 PM   Result Value Ref Range    Sodium 136 136 - 145 mmol/L    Potassium 4.2 3.5 - 5.1 mmol/L    Chloride 93 (L) 95 - 110 mmol/L    CO2 24 23 - 29 mmol/L    Glucose 102 70 - 110 mg/dL    BUN 23 (H) 6 - 20 mg/dL    Creatinine 4.4 (H) 0.5 - 1.4 mg/dL    Calcium 7.2 (L) 8.7 - 10.5 mg/dL    Protein Total 8.3 6.0 - 8.4 gm/dL    Albumin 3.2 (L) 3.5 - 5.2 g/dL    Bilirubin Total 0.8 0.1 - 1.0 mg/dL    ALP 1,122 (H) 40 - 150 unit/L    AST 38 11 - 45 unit/L    ALT 23 10 - 44 unit/L    Anion Gap 19 (H) 8 - 16 mmol/L    eGFR 11 (L) >60 mL/min/1.73/m2   Magnesium    Collection Time: 05/24/25  5:35 PM   Result Value Ref Range    Magnesium  2.2 1.6 - 2.6 mg/dL    Phosphorus    Collection Time: 05/24/25  5:35 PM   Result Value Ref Range    Phosphorus Level 4.7 (H) 2.7 - 4.5 mg/dL   Protime-INR    Collection Time: 05/24/25  5:35 PM   Result Value Ref Range    PT 12.0 9.0 - 12.5 seconds    INR 1.1 0.8 - 1.2   APTT    Collection Time: 05/24/25  5:35 PM   Result Value Ref Range    PTT 30.2 21.0 - 32.0 seconds   Brain natriuretic peptide    Collection Time: 05/24/25  5:35 PM   Result Value Ref Range     (H) 0 - 99 pg/mL   Troponin I    Collection Time: 05/24/25  5:35 PM   Result Value Ref Range    Troponin-I 0.143 (H) <=0.026 ng/mL   Procalcitonin    Collection Time: 05/24/25  5:35 PM   Result Value Ref Range    Procalcitonin 0.90 (H) <0.25 ng/mL   CBC with Differential    Collection Time: 05/24/25  5:35 PM   Result Value Ref Range    WBC 5.52 3.90 - 12.70 K/uL    RBC 2.91 (L) 4.00 - 5.40 M/uL    HGB 8.9 (L) 12.0 - 16.0 gm/dL    HCT 28.5 (L) 37.0 - 48.5 %    MCV 98 82 - 98 fL    MCH 30.6 27.0 - 31.0 pg    MCHC 31.2 (L) 32.0 - 36.0 g/dL    RDW 15.5 (H) 11.5 - 14.5 %    Platelet Count 198 150 - 450 K/uL    MPV 10.3 9.2 - 12.9 fL    Nucleated RBC 0 <=0 /100 WBC    Neut % 45.3 38 - 73 %    Lymph % 34.4 18 - 48 %    Mono % 10.7 4 - 15 %    Eos % 8.2 (H) <=8 %    Basophil % 0.7 <=1.9 %    Imm Grans % 0.7 (H) 0.0 - 0.5 %    Neut # 2.50 1.8 - 7.7 K/uL    Lymph # 1.90 1 - 4.8 K/uL    Mono # 0.59 0.3 - 1 K/uL    Eos # 0.45 <=0.5 K/uL    Baso # 0.04 <=0.2 K/uL    Imm Grans # 0.04 0.00 - 0.04 K/uL   ISTAT Lactate    Collection Time: 05/24/25  5:46 PM   Result Value Ref Range    POC Lactate 5.34 (HH) 0.5 - 2.2 mmol/L    Sample VENOUS     Site Other     Allens Test N/A     DelSys Room Air     Mode SPONT    Type & Screen    Collection Time: 05/24/25  6:14 PM   Result Value Ref Range    Specimen Outdate 05/27/2025 23:59     Group & Rh A POS     Indirect Ananya NEG    Lactic acid, plasma #2    Collection Time: 05/24/25  9:27 PM   Result Value Ref Range    Lactic Acid Level 1.9 0.5 - 2.2  mmol/L   Procalcitonin    Collection Time: 05/24/25  9:27 PM   Result Value Ref Range    Procalcitonin 0.84 (H) <0.25 ng/mL   Troponin I    Collection Time: 05/24/25  9:27 PM   Result Value Ref Range    Troponin-I 0.144 (H) <=0.026 ng/mL       Microbiology Results (last 7 days)       Procedure Component Value Units Date/Time    Blood culture x two cultures. Draw prior to antibiotics. [[id number]] Collected: 05/24/25 1743    Order Status: Resulted Specimen: Blood from Peripheral, Forearm, Right Updated: 05/24/25 1750    Blood culture x two cultures. Draw prior to antibiotics. [[id number]] Collected: 05/24/25 1735    Order Status: Resulted Specimen: Blood from Peripheral, Hand, Right Updated: 05/24/25 1744            Imaging Results               X-Ray Chest AP Portable (Final result)  Result time 05/24/25 21:34:32      Final result by Gael Hare MD (05/24/25 21:34:32)                   Impression:      1. Cardiomegaly with nonspecific mild diffuse interstitial changes.  Mild edema is a consideration.  2. Expansile lytic mass in the distal left clavicle could represent metastatic disease.  Additionally there is a rib fracture posteriorly on the right at the 7th rib with limited visualization.  Pathologic fracture is not excluded.  Follow-up recommended.  3. This report was flagged in Epic as abnormal.      Electronically signed by: Gael Hare  Date:    05/24/2025  Time:    21:34               Narrative:    EXAMINATION:  XR CHEST AP PORTABLE    CLINICAL HISTORY:  Sepsis;    TECHNIQUE:  Single frontal view of the chest was performed.    COMPARISON:  04/15/2024    FINDINGS:  Cardiac silhouette is enlarged.  Left IJ central venous catheter with tip overlying right atrium similar to the prior study.    Cardiac defibrillator device on the left.    No large effusion no evidence of pneumothorax.    Minimal diffuse nonspecific interstitial changes, similar to the prior study.    Expansile lytic mass in the distal  left clavicle could represent metastatic disease.    There is a rib fracture posteriorly on the right at the 7th rib with limited visualization.  Pathologic fracture is not excluded.    Recommend follow-up

## 2025-05-25 NOTE — ASSESSMENT & PLAN NOTE
Due to continued use of ASA 81mg daily  and Eliquis 2.5mg PO BID (last taken this am). Working with ED to slow the bleeding. Cont. To monitor. NO indication for Kcentra or transfusion at this time. HOLD ASA an Eliquis. Suction at bedside. NPO except for meds tonight. Patients most recent Hgb, Hct, platelets, and INR are listed below.  Recent Labs     05/24/25  1735   HGB 8.9*   HCT 28.5*      INR 1.1     Monitor on tele

## 2025-05-25 NOTE — NURSING
Patient arrived to the unit via stretcher accompanied by a transporter, family member at the bedside. Pt noted to have an active bleeding on her mouth. On room air and no apparent distress noted. Oriented on her room and got situated on a bed. Put bed in lowest position, with HOB elevated. Side rails raised, instructed to call for assistance. Admit assessment initiated. Vital signs taken. Four eyes completed.     [facility] [place]  Nurses Note -- 4 Eyes      5/25/2025       Skin assessed on: Admit      [x] No Pressure Injuries Present    []Prevention Measures Documented    [] Yes LDA  for Pressure Injury Previously documented     [] Yes New Pressure Injury Discovered   [] LDA for New Pressure Injury Added      Attending RN:  Demian Hay RN     Second RN:  Sarah Blankenship RN

## 2025-05-25 NOTE — PROGRESS NOTES
HOSPITALIST ON CALL NOTE:    Went to bedside to f/u on bleeding from upper right gum. Patient again bleeding despite multiple attempts by the ED to stop it. Vitals stable. Called placed to transfer center to reach out to OMFS. Currently no OMFS services at [facility]. Reached out the [facility] OMFS and they do not give outside consultation but would see her as a transfer.  Transfer request placed to [facility] with escalation due to not having the service at this facility or another [facility] facility. ED contacted again to help with packing while awaiting transfer acceptance.

## 2025-05-25 NOTE — PROGRESS NOTES
HOSPITALIST ON CALL NOTE:    F/u with ED and now s/p injection by the ED of lidocaine w/ epi around the bleeding upper gum area with pressure applied to surgicell placed directly over the bleeding area. Bleeding has stopped BUT now with significant clot in place. Will f/u with transfer center in am. F/u on AM labs and cont. IV abx for now. Holding eliquis and asa.

## 2025-05-26 ENCOUNTER — HOSPITAL ENCOUNTER (OUTPATIENT)
Dept: CARDIOLOGY | Facility: HOSPITAL | Age: 60
Discharge: HOME | End: 2025-05-26
Attending: SURGERY
Payer: COMMERCIAL

## 2025-05-26 ENCOUNTER — EXTERNAL HOME HEALTH (OUTPATIENT)
Dept: HOME HEALTH SERVICES | Facility: HOSPITAL | Age: 60
End: 2025-05-26
Payer: MEDICARE

## 2025-05-26 NOTE — ASSESSMENT & PLAN NOTE
Due to continued use of ASA 81mg daily  and Eliquis 2.5mg PO BID (last taken this am). Working with ED to slow the bleeding. Cont. To monitor. NO indication for Kcentra or transfusion at this time. HOLD ASA an Eliquis. Suction at bedside. NPO except for meds tonight. Patients most recent Hgb, Hct, platelets, and INR are listed below.  Recent Labs     05/24/25  1735 05/25/25  0634   HGB 8.9* 6.2*   HCT 28.5* 19.9*    136*   INR 1.1  --      Monitor on tele

## 2025-05-26 NOTE — ASSESSMENT & PLAN NOTE
Patient has paroxysmal (<7 days) atrial fibrillation. Patient is currently in sinus rhythm. [id number] Score: 2. The patients heart rate in the last 24 hours is as follows:  No data recorded     Antiarrhythmics       Anticoagulants   Eliquis 2.5mg PO BID     Plan  - Replete lytes with a goal of K>4, Mg >2  - Patient is anticoagulated, see medications listed above. BUT will hold for now given continued bleeding from tooth extraction thalia.   - Patient's afib is currently controlled

## 2025-05-26 NOTE — ASSESSMENT & PLAN NOTE
Patient has Combined Systolic and Diastolic heart failure that is Chronic. On presentation their CHF was well compensated. Most recent BNP and echo results are listed below.  Recent Labs     05/24/25  1735   *     Latest ECHO  Results for orders placed during the hospital encounter of 07/05/24    Echo    Interpretation Summary    Left Ventricle: The left ventricle is normal in size. Moderately increased wall thickness. There is moderate concentric hypertrophy. Severe global hypokinesis present. There is severely reduced systolic function with a visually estimated ejection fraction of 20 - 25%.    Right Ventricle: Moderate right ventricular enlargement. Systolic function is moderately reduced.    Aortic Valve: The aortic valve is a trileaflet valve. Mildly restricted motion. There is moderate stenosis. Aortic valve area by VTI is 0.86 cm². Aortic valve peak velocity is 2.34 m/s. Mean gradient is 12 mmHg. The dimensionless index is 0.31. Cannot exclude pseudostenosis due to low EF.    Mitral Valve: There is moderate bileaflet sclerosis. There is mild stenosis. The mean pressure gradient across the mitral valve is 3 mmHg at a heart rate of 80 bpm. There is moderate regurgitation with an eccentric jet.    Tricuspid Valve: There is severe regurgitation with an eccentrically directed jet.    Pulmonic Valve: There is moderate regurgitation.    Pulmonary Artery: The estimated pulmonary artery systolic pressure is 64 mmHg.    Pericardium: There is a moderate posterior effusion. No indication of cardiac tamponade.    Current Heart Failure Medications       Plan  - Monitor strict I&Os and daily weights.    - Place on telemetry  - Low sodium diet when diet resumed.   - Place on fluid restriction of 1.5 L.

## 2025-05-26 NOTE — DISCHARGE SUMMARY
[facility]  Discharge Summary      Patient Name: Eva Bloom  MRN: [id number]  TRENT: [id number]  Patient Class: IP- Inpatient  Admission Date: 5/24/2025  Hospital Length of Stay: 1 days  Discharge Date and Time: 05/26/2025 12:07 PM  Attending Physician: No att. providers found   Discharging Provider: Danilo Olivier MD  Primary Care Provider: Sowmya Mccain MD    Primary Care Team: Networked reference to record PCT     HPI:   59 y.o. AAF with h/o ESRD on HD via left tunneled HD catheter (TTS at [facility]  follows with Dr. Motley), paroxsymal afib (on eliquis 2.5mg PO BID), Systolic/Diastolic CHF (with AICD. EF 20-25% in April 2024), essential HTN, HLD, Hypothyrodisim, PAD (complicated by bilateral dry gangrene/heel ulcers->follows with vascular and wound care) presents to the ER with continued bleeding from her extracted teeth sites. NO fevers or chills. States had taken her ASA and Eliquis prior to, day of and days after her teeth being extracted. Had 3 on the upper right removed and 1 on the lower right removed on Wednesday. Has been bleeding on and off since then. Only on liquid diet right now. Went to HD on Thursday and today and had about 2L removed.     In the ED was noted to have SBP initially in the 70-80/40-50s NSR.   Started on IVF bolus (500ml total) with periopheral levophed.   Labs with WBC 5.5 and H/H 8.9/28 (baseline) plt 198. INR 1.1.  Chemistry c/w ESRD with no indications for emergent HD tonight.    and Troponin 0.143. Procal 0.9 and POCT lactic 5.34.     Started on IV abx for possible bacteremia/sepsis due to Dental extraction. We have been consulted for further management.     Went to bedside with ED provider who removed the clot attached to the upper right tooth socket. Suction and lidocane/epi given initially. Eval 30mins later still with oozing but improved. TXA soaked surgicel applied. BP after 10mg PO Midodrine, IVF bolus and IVF with abx  improved and was weaned off of the levophed and stable for >30mins. Able to be monitored on the floor with tele.     * No surgery found *      Hospital Course:   Patient with nearly 3U of blood, nearly 9-->6  No fluid dilution. (On 250 mL given, ESRD)  Still having ongoing bleeding despite topical TXA (3,000)      Would avoid systemic reversal if possible, since she has Afib do not want her to clot elsewhere  Having trop elevation, suspect from low perfusion  On Zosyn for ?sepsis   Will need to restart her AC for afib too  No Dental or ENT here, will need to tx     Note: Qtc 600, avoid prolongers  Hypocalcemia (even after correction)- giving IV calcium      ROS:  General: Negative for fevers or chills.  Cardiac: Negative for chest pain   Pulmonary: Negative for wheezing.  GI: Negative for abdominal pain     Vitals:    05/25/25 0318 05/25/25 0423 05/25/25 0805 05/25/25 0820   BP:  (!) 107/55  116/70   BP Location:  Right arm     Patient Position:  Lying     Pulse: 72 73 67 76   Resp:  19  16   Temp:  98.6 °F (37 °C)  98 °F (36.7 °C)   TempSrc:  Oral  Oral   SpO2: 97% 96%  95%   Weight:       Height:              Body mass index is 21.53 kg/m².      PHYSICAL EXAM:  GENERAL APPEARANCE: alert and cooperative  HEENT:     HEAD: Bleed has slowed, large lesion/sore in R check/jaw  CARDIAC: There is no peripheral edema, cyanosis or pallor.   LUNGS: No wheezing, work of breathing normal  ABDOMEN: Non-distended. No guarding.  EXTREMITIES: No abnormal edema.   NEUROLOGICAL: CN II-XII grossly intact.   SKIN: Skin normal color, no signs of active infections  PSYCHIATRIC: No tangential speech. No Hyperactive features.    Goals of Care Treatment Preferences:  Code Status: Full Code    Health care agent: Otis Bloom ()  Health care agent number: [phone] cell                   SDOH Screening:  The patient was screened for utility difficulties, food insecurity, transport difficulties, housing insecurity, and interpersonal  safety and there were no concerns identified this admission.     Consults:     Assessment & Plan  Bleeding post tooth extraction  Due to continued use of ASA 81mg daily  and Eliquis 2.5mg PO BID (last taken this am). Working with ED to slow the bleeding. Cont. To monitor. NO indication for Kcentra or transfusion at this time. HOLD ASA an Eliquis. Suction at bedside. NPO except for meds tonight. Patients most recent Hgb, Hct, platelets, and INR are listed below.  Recent Labs     05/24/25  1735 05/25/25  0634   HGB 8.9* 6.2*   HCT 28.5* 19.9*    136*   INR 1.1  --      Monitor on tele   Hypotension due to hypovolemia  Improved with IV bolus and will cont. Midodrine for now 10mg PO TID (Hold for SBP>110). Initially elevated lactic acid suspect possible lab error with repeat now wnl. Procal still elevated 0.84 and will cont. With IV abx and f/u cultures. Low suspicion at this time for sepsis but will monitor for developing bacteremia s/p tooth extraction.   PAF (paroxysmal atrial fibrillation)  Patient has paroxysmal (<7 days) atrial fibrillation. Patient is currently in sinus rhythm. [id number] Score: 2. The patients heart rate in the last 24 hours is as follows:  No data recorded     Antiarrhythmics       Anticoagulants   Eliquis 2.5mg PO BID     Plan  - Replete lytes with a goal of K>4, Mg >2  - Patient is anticoagulated, see medications listed above. BUT will hold for now given continued bleeding from tooth extraction thalia.   - Patient's afib is currently controlled    ESRD (end stage renal disease) on dialysis  No indication for HD tonight. F/u on lytes and consult Nephrology is not d/c in 24-36 hours for routine HD eval.   Chronic combined systolic and diastolic congestive heart failure  Patient has Combined Systolic and Diastolic heart failure that is Chronic. On presentation their CHF was well compensated. Most recent BNP and echo results are listed below.  Recent Labs     05/24/25 1735   *      Latest ECHO  Results for orders placed during the hospital encounter of 07/05/24    Echo    Interpretation Summary    Left Ventricle: The left ventricle is normal in size. Moderately increased wall thickness. There is moderate concentric hypertrophy. Severe global hypokinesis present. There is severely reduced systolic function with a visually estimated ejection fraction of 20 - 25%.    Right Ventricle: Moderate right ventricular enlargement. Systolic function is moderately reduced.    Aortic Valve: The aortic valve is a trileaflet valve. Mildly restricted motion. There is moderate stenosis. Aortic valve area by VTI is 0.86 cm². Aortic valve peak velocity is 2.34 m/s. Mean gradient is 12 mmHg. The dimensionless index is 0.31. Cannot exclude pseudostenosis due to low EF.    Mitral Valve: There is moderate bileaflet sclerosis. There is mild stenosis. The mean pressure gradient across the mitral valve is 3 mmHg at a heart rate of 80 bpm. There is moderate regurgitation with an eccentric jet.    Tricuspid Valve: There is severe regurgitation with an eccentrically directed jet.    Pulmonic Valve: There is moderate regurgitation.    Pulmonary Artery: The estimated pulmonary artery systolic pressure is 64 mmHg.    Pericardium: There is a moderate posterior effusion. No indication of cardiac tamponade.    Current Heart Failure Medications       Plan  - Monitor strict I&Os and daily weights.    - Place on telemetry  - Low sodium diet when diet resumed.   - Place on fluid restriction of 1.5 L.     PAD (peripheral artery disease)  Follows with vascular and wound care as outpatient. Will consult wound care while here.     Non-ischemic myocardial injury (non-traumatic)  Elevated troponin levels x2 but no c/o chest pain. Relatively unchanged troponin from initial to repeat. Suspect due to hypotension and poor renal clearance. NSR and NO STEMI on EKG. Noted prolonged qt but no lab abnormalities noted that would worsening this.  She is on remeron and this can cause QT prolongation. Will hold for now. F/u on TSH . Cont. To monitor for now. F/u repeat troponin level and EKG.     Final Active Diagnoses:    Diagnosis Date Noted POA    PRINCIPAL PROBLEM:  Bleeding post tooth extraction [K91.840] 05/24/2025 Yes    Hypotension due to hypovolemia [E86.1] 05/24/2025 Yes    PAD (peripheral artery disease) [I73.9] 11/01/2023 Yes    PAF (paroxysmal atrial fibrillation) [I48.0] 07/25/2022 Yes     Chronic    Chronic combined systolic and diastolic congestive heart failure [I50.42]  Yes    ESRD (end stage renal disease) on dialysis [N18.6, Z99.2]  Not Applicable     Chronic    Non-ischemic myocardial injury (non-traumatic) [I5A] 07/21/2017 Yes      Problems Resolved During this Admission:       Discharged Condition: fair    Disposition: Another Health Care Inst*    Follow Up:   Follow-up Information       Sowmya Mccain MD. Go to.    Specialties: Internal Medicine, Pediatrics  Why: Hospital follow-up on 5/30/25 @8:30am with TED Sargent  Contact information:  [address] [place] [id number] [phone]                           Patient Instructions:   No discharge procedures on file.    Significant Diagnostic Studies:   Recent Results (from the past 100 hours)   Blood culture x two cultures. Draw prior to antibiotics.    Collection Time: 05/24/25  5:35 PM    Specimen: Peripheral, Hand, Right; Blood   Result Value Ref Range    Blood Culture No Growth After 36 Hours    Comprehensive metabolic panel    Collection Time: 05/24/25  5:35 PM   Result Value Ref Range    Sodium 136 136 - 145 mmol/L    Potassium 4.2 3.5 - 5.1 mmol/L    Chloride 93 (L) 95 - 110 mmol/L    CO2 24 23 - 29 mmol/L    Glucose 102 70 - 110 mg/dL    BUN 23 (H) 6 - 20 mg/dL    Creatinine 4.4 (H) 0.5 - 1.4 mg/dL    Calcium 7.2 (L) 8.7 - 10.5 mg/dL    Protein Total 8.3 6.0 - 8.4 gm/dL    Albumin 3.2 (L) 3.5 - 5.2 g/dL    Bilirubin Total 0.8 0.1 - 1.0 mg/dL    ALP 1,122 (H) 40 -  150 unit/L    AST 38 11 - 45 unit/L    ALT 23 10 - 44 unit/L    Anion Gap 19 (H) 8 - 16 mmol/L    eGFR 11 (L) >60 mL/min/1.73/m2   Magnesium    Collection Time: 05/24/25  5:35 PM   Result Value Ref Range    Magnesium  2.2 1.6 - 2.6 mg/dL   Phosphorus    Collection Time: 05/24/25  5:35 PM   Result Value Ref Range    Phosphorus Level 4.7 (H) 2.7 - 4.5 mg/dL   Protime-INR    Collection Time: 05/24/25  5:35 PM   Result Value Ref Range    PT 12.0 9.0 - 12.5 seconds    INR 1.1 0.8 - 1.2   APTT    Collection Time: 05/24/25  5:35 PM   Result Value Ref Range    PTT 30.2 21.0 - 32.0 seconds   Brain natriuretic peptide    Collection Time: 05/24/25  5:35 PM   Result Value Ref Range     (H) 0 - 99 pg/mL   Troponin I    Collection Time: 05/24/25  5:35 PM   Result Value Ref Range    Troponin-I 0.143 (H) <=0.026 ng/mL   Procalcitonin    Collection Time: 05/24/25  5:35 PM   Result Value Ref Range    Procalcitonin 0.90 (H) <0.25 ng/mL   CBC with Differential    Collection Time: 05/24/25  5:35 PM   Result Value Ref Range    WBC 5.52 3.90 - 12.70 K/uL    RBC 2.91 (L) 4.00 - 5.40 M/uL    HGB 8.9 (L) 12.0 - 16.0 gm/dL    HCT 28.5 (L) 37.0 - 48.5 %    MCV 98 82 - 98 fL    MCH 30.6 27.0 - 31.0 pg    MCHC 31.2 (L) 32.0 - 36.0 g/dL    RDW 15.5 (H) 11.5 - 14.5 %    Platelet Count 198 150 - 450 K/uL    MPV 10.3 9.2 - 12.9 fL    Nucleated RBC 0 <=0 /100 WBC    Neut % 45.3 38 - 73 %    Lymph % 34.4 18 - 48 %    Mono % 10.7 4 - 15 %    Eos % 8.2 (H) <=8 %    Basophil % 0.7 <=1.9 %    Imm Grans % 0.7 (H) 0.0 - 0.5 %    Neut # 2.50 1.8 - 7.7 K/uL    Lymph # 1.90 1 - 4.8 K/uL    Mono # 0.59 0.3 - 1 K/uL    Eos # 0.45 <=0.5 K/uL    Baso # 0.04 <=0.2 K/uL    Imm Grans # 0.04 0.00 - 0.04 K/uL   Blood culture x two cultures. Draw prior to antibiotics.    Collection Time: 05/24/25  5:43 PM    Specimen: Peripheral, Forearm, Right; Blood   Result Value Ref Range    Blood Culture No Growth After 36 Hours    ISTAT Lactate    Collection Time: 05/24/25   5:46 PM   Result Value Ref Range    POC Lactate 5.34 (HH) 0.5 - 2.2 mmol/L    Sample VENOUS     Site Other     Allens Test N/A     DelSys Room Air     Mode SPONT    Type & Screen    Collection Time: 05/24/25  6:14 PM   Result Value Ref Range    Specimen Outdate 05/27/2025 23:59     Group & Rh A POS     Indirect Ananya NEG    Prepare RBC 2 Units; active bleed    Collection Time: 05/24/25  6:14 PM   Result Value Ref Range    UNIT NUMBER [id number]     UNIT ABO/RH A POS     DISPENSE STATUS Selected     Unit Expiration [id number]     Product Code I9359G73     Unit Blood Type Code 6200     CROSSMATCH INTERPRETATION Compatible     UNIT NUMBER [id number]     UNIT ABO/RH A POS     DISPENSE STATUS Selected     Unit Expiration [id number]     Product Code W8090P24     Unit Blood Type Code 6200     CROSSMATCH INTERPRETATION Compatible    Lactic acid, plasma #2    Collection Time: 05/24/25  9:27 PM   Result Value Ref Range    Lactic Acid Level 1.9 0.5 - 2.2 mmol/L   Procalcitonin    Collection Time: 05/24/25  9:27 PM   Result Value Ref Range    Procalcitonin 0.84 (H) <0.25 ng/mL   Troponin I    Collection Time: 05/24/25  9:27 PM   Result Value Ref Range    Troponin-I 0.144 (H) <=0.026 ng/mL   TSH    Collection Time: 05/24/25  9:27 PM   Result Value Ref Range    TSH 0.625 0.400 - 4.000 uIU/mL   POCT COVID-19 Rapid Screening    Collection Time: 05/24/25 11:36 PM   Result Value Ref Range    POC Rapid COVID Negative Negative     Acceptable Yes    POCT Influenza A/B Molecular    Collection Time: 05/24/25 11:36 PM   Result Value Ref Range    POC Molecular Influenza A Ag Negative Negative    POC Molecular Influenza B Ag Negative Negative     Acceptable Yes    Procalcitonin    Collection Time: 05/25/25  6:34 AM   Result Value Ref Range    Procalcitonin 0.80 (H) <0.25 ng/mL   Basic metabolic panel    Collection Time: 05/25/25  6:34 AM   Result Value Ref Range    Sodium 136 136 - 145 mmol/L     Potassium 4.1 3.5 - 5.1 mmol/L    Chloride 93 (L) 95 - 110 mmol/L    CO2 23 23 - 29 mmol/L    Glucose 229 (H) 70 - 110 mg/dL    BUN 32 (H) 6 - 20 mg/dL    Creatinine 5.2 (H) 0.5 - 1.4 mg/dL    Calcium 6.6 (LL) 8.7 - 10.5 mg/dL    Anion Gap 20 (H) 8 - 16 mmol/L    eGFR 9 (L) >60 mL/min/1.73/m2   Magnesium    Collection Time: 05/25/25  6:34 AM   Result Value Ref Range    Magnesium  2.0 1.6 - 2.6 mg/dL   Troponin I    Collection Time: 05/25/25  6:34 AM   Result Value Ref Range    Troponin-I 0.122 (H) <=0.026 ng/mL   CBC with Differential    Collection Time: 05/25/25  6:34 AM   Result Value Ref Range    WBC 5.12 3.90 - 12.70 K/uL    RBC 2.00 (L) 4.00 - 5.40 M/uL    HGB 6.2 (L) 12.0 - 16.0 gm/dL    HCT 19.9 (LL) 37.0 - 48.5 %     (H) 82 - 98 fL    MCH 31.0 27.0 - 31.0 pg    MCHC 31.2 (L) 32.0 - 36.0 g/dL    RDW 15.4 (H) 11.5 - 14.5 %    Platelet Count 136 (L) 150 - 450 K/uL    MPV 9.4 9.2 - 12.9 fL    Nucleated RBC 0 <=0 /100 WBC    Neut % 47.9 38 - 73 %    Lymph % 31.6 18 - 48 %    Mono % 11.5 4 - 15 %    Eos % 7.2 <=8 %    Basophil % 0.6 <=1.9 %    Imm Grans % 1.2 (H) 0.0 - 0.5 %    Neut # 2.45 1.8 - 7.7 K/uL    Lymph # 1.62 1 - 4.8 K/uL    Mono # 0.59 0.3 - 1 K/uL    Eos # 0.37 <=0.5 K/uL    Baso # 0.03 <=0.2 K/uL    Imm Grans # 0.06 (H) 0.00 - 0.04 K/uL   Folate    Collection Time: 05/25/25  6:47 AM   Result Value Ref Range    Folate Level 8.2 4.0 - 24.0 ng/mL   Vitamin B12    Collection Time: 05/25/25  6:47 AM   Result Value Ref Range    Vitamin B12 854 210 - 950 pg/mL   Vancomycin, Random    Collection Time: 05/25/25  6:47 AM   Result Value Ref Range    Vancomycin Random 24.7 Not established ug/ml       Microbiology Results (last 7 days)       ** No results found for the last 168 hours. **            Imaging Results               X-Ray Chest AP Portable (Final result)  Result time 05/24/25 21:34:32      Final result by Gael Hare MD (05/24/25 21:34:32)                   Impression:      1. Cardiomegaly  with nonspecific mild diffuse interstitial changes.  Mild edema is a consideration.  2. Expansile lytic mass in the distal left clavicle could represent metastatic disease.  Additionally there is a rib fracture posteriorly on the right at the 7th rib with limited visualization.  Pathologic fracture is not excluded.  Follow-up recommended.  3. This report was flagged in Epic as abnormal.      Electronically signed by: Gael Andres  Date:    05/24/2025  Time:    21:34               Narrative:    EXAMINATION:  XR CHEST AP PORTABLE    CLINICAL HISTORY:  Sepsis;    TECHNIQUE:  Single frontal view of the chest was performed.    COMPARISON:  04/15/2024    FINDINGS:  Cardiac silhouette is enlarged.  Left IJ central venous catheter with tip overlying right atrium similar to the prior study.    Cardiac defibrillator device on the left.    No large effusion no evidence of pneumothorax.    Minimal diffuse nonspecific interstitial changes, similar to the prior study.    Expansile lytic mass in the distal left clavicle could represent metastatic disease.    There is a rib fracture posteriorly on the right at the 7th rib with limited visualization.  Pathologic fracture is not excluded.    Recommend follow-up                                          Pending Diagnostic Studies:       Procedure Component Value Units Date/Time    EKG 12-lead [[id number]] Collected: 05/25/25 0300    Order Status: Sent Lab Status: No result            Medications:  Reconciled Home Medications:      Medication List        ASK your doctor about these medications      acetaminophen 500 MG tablet  Commonly known as: TYLENOL  Take 1 tablet (500 mg total) by mouth every 4 (four) hours as needed for Pain or Temperature greater than (100.5 or greater).     apixaban 2.5 mg Tab  Commonly known as: ELIQUIS  Take 1 tablet (2.5 mg total) by mouth 2 (two) times daily.     aspirin 81 MG EC tablet  Commonly known as: ECOTRIN  Take 1 tablet (81 mg total) by mouth once  daily.     atorvastatin 40 MG tablet  Commonly known as: LIPITOR  TAKE 1 TABLET BY MOUTH EVERY DAY     levothyroxine 75 MCG tablet  Commonly known as: SYNTHROID  Take 1 tablet (75 mcg total) by mouth once daily.     LIDOcaine-prilocaine cream  Commonly known as: EMLA  APPLY ATLEAST 30 MINUTES BEFORE TREATMENT 3 TIMES A WEEK     metoprolol succinate 25 MG 24 hr tablet  Commonly known as: TOPROL-XL  TAKE 1 TABLET BY MOUTH EVERY DAY     mirtazapine 7.5 MG Tab  Commonly known as: REMERON  Take 1 tablet (7.5 mg total) by mouth every evening.     naloxone 1 mg/mL injection  Commonly known as: NARCAN  2 mg (1 mg per nostril) by Nasal route as needed for opioid overdose; may repeat in 3 to 5 minutes if not effective. Call 911              Indwelling Lines/Drains at time of discharge:   Lines/Drains/Airways       Central Venous Catheter Line  Duration                  Hemodialysis Catheter 06/07/24 1047 left internal jugular 353 days                    Time spent on the discharge of patient: 35 minutes         Danilo Olivier MD  Department of Hospital Medicine  [facility]

## 2025-05-27 LAB
ABO + RH BLD: NORMAL
ABO + RH BLD: NORMAL
BLD PROD TYP BPU: NORMAL
BLD PROD TYP BPU: NORMAL
BLOOD UNIT EXPIRATION DATE: NORMAL
BLOOD UNIT EXPIRATION DATE: NORMAL
BLOOD UNIT TYPE CODE: 6200
BLOOD UNIT TYPE CODE: 6200
CROSSMATCH INTERPRETATION: NORMAL
CROSSMATCH INTERPRETATION: NORMAL
DISPENSE STATUS: NORMAL
DISPENSE STATUS: NORMAL
OHS QRS DURATION: 100 MS
OHS QRS DURATION: 98 MS
OHS QTC CALCULATION: 602 MS
OHS QTC CALCULATION: 604 MS
UNIT NUMBER: NORMAL
UNIT NUMBER: NORMAL

## 2025-05-29 LAB
BACTERIA BLD CULT: NORMAL
BACTERIA BLD CULT: NORMAL

## 2025-05-30 ENCOUNTER — TELEPHONE (OUTPATIENT)
Dept: FAMILY MEDICINE | Facility: CLINIC | Age: 60
End: 2025-05-30
Payer: MEDICARE

## 2025-05-30 ENCOUNTER — OFFICE VISIT (OUTPATIENT)
Dept: FAMILY MEDICINE | Facility: CLINIC | Age: 60
End: 2025-05-30
Payer: MEDICARE

## 2025-05-30 ENCOUNTER — TELEPHONE (OUTPATIENT)
Dept: WOUND CARE | Facility: HOSPITAL | Age: 60
End: 2025-05-30
Payer: MEDICARE

## 2025-05-30 VITALS
TEMPERATURE: 99 F | DIASTOLIC BLOOD PRESSURE: 76 MMHG | SYSTOLIC BLOOD PRESSURE: 110 MMHG | OXYGEN SATURATION: 97 % | BODY MASS INDEX: 21.53 KG/M2 | HEART RATE: 86 BPM | HEIGHT: 64 IN

## 2025-05-30 DIAGNOSIS — E43 SEVERE PROTEIN-CALORIE MALNUTRITION: ICD-10-CM

## 2025-05-30 DIAGNOSIS — J20.8 ACUTE BACTERIAL BRONCHITIS: Primary | ICD-10-CM

## 2025-05-30 DIAGNOSIS — L89.620 PRESSURE ULCER OF LEFT HEEL, UNSTAGEABLE: ICD-10-CM

## 2025-05-30 DIAGNOSIS — B96.89 ACUTE BACTERIAL BRONCHITIS: Primary | ICD-10-CM

## 2025-05-30 PROCEDURE — 99999 PR PBB SHADOW E&M-EST. PATIENT-LVL V: CPT | Mod: PBBFAC,,,

## 2025-05-30 PROCEDURE — 99215 OFFICE O/P EST HI 40 MIN: CPT | Mod: PBBFAC,PO

## 2025-05-30 RX ORDER — DOXYCYCLINE 100 MG/1
100 CAPSULE ORAL EVERY 12 HOURS
Qty: 20 CAPSULE | Refills: 0 | Status: SHIPPED | OUTPATIENT
Start: 2025-05-30 | End: 2025-06-09

## 2025-05-30 NOTE — TELEPHONE ENCOUNTER
----- Message from Tech Valerie sent at 5/30/2025  8:16 AM CDT -----  Regarding: Patient call back  .Type: Patient Call BackWho called:Estrella is the request in detail:calling to inform the doctor that the 2nd toe(next to the big toe) on the left foot fell off on yesterday; caller states home health nurse came out on yesterday to assist with the matter. Asking for a call backCan the clinic reply by [facility]?no Would the patient rather a call back or a response via [facility]? Call Best call back number:.[phone] Information:

## 2025-05-30 NOTE — TELEPHONE ENCOUNTER
Return call to Ms. Eva, spoke with  who states he was calling Wound Care to make Clinic aware of current situation.  Mr. Berg states MARINA Mauricio from [facility] came out.  Mr. Berg reports everything is good right now. Encouraged to go to ER if any complications. Patient is scheduled for [facility] on Monday.

## 2025-05-30 NOTE — PROGRESS NOTES
HPI     Chief Complaint:  Chief Complaint   Patient presents with    Follow-up       Eva Bloom is a 59 y.o. female with multiple medical diagnoses as listed in the medical history and problem list that presents for   Chief Complaint   Patient presents with    Follow-up    .     Patient is know to me with her last appointment with me on 5/15/2024.     Pt presents for follow up.  Complains of cough.  Cough  This is a new problem. The current episode started in the past 7 days. The cough is Non-productive. Pertinent negatives include no chills, fever, postnasal drip, sore throat, shortness of breath or wheezing. Treatments tried: delsym. The treatment provided mild relief. There is no history of asthma, bronchitis or COPD.   In dialysis 3 times per week.  Second toe on left foot came off yesterday. Follow up with Dr. Jones on 6/2.  Home health coming into home once weekly.      Assessment & Plan     Problem List Items Addressed This Visit       Severe protein-calorie malnutrition  Stable. Will order lipid panel with upcoming lab appointment.    Relevant Orders    LIPID PANEL     Other Visit Diagnoses         Acute bacterial bronchitis    -  Primary  Cough for the past week. On physical exam posterior lung fields with rhonchi, no wheezing or rales. Will treat with doxycyline.    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 Follow up with Dr. Jones on 6/2.              --------------------------------------------      Health Maintenance:  Health Maintenance         Date Due Completion Date    Cervical Cancer Screening 11/16/2023 11/16/2018    COVID-19 Vaccine (8 - 2024-25 season) 12/25/2024 10/30/2024    Lipid Panel 05/01/2025 5/1/2024    TETANUS VACCINE 09/23/2025 9/23/2015    Mammogram 02/26/2027 2/26/2025    Colorectal Cancer Screening 03/12/2028 3/12/2021            Health maintenance  "reviewed    Follow Up:  Follow up in about 6 months (around 11/30/2025).    Exam     Review of Systems:  (as noted above)  Review of Systems   Constitutional:  Negative for chills and fever.   HENT:  Negative for postnasal drip and sore throat.    Respiratory:  Positive for cough. Negative for shortness of breath and wheezing.        Physical Exam:   Physical Exam  Constitutional:       General: She is not in acute distress.     Appearance: Normal appearance. She is not ill-appearing, toxic-appearing or diaphoretic.      Comments: Seated in wheelchair   Cardiovascular:      Rate and Rhythm: Normal rate and regular rhythm.   Pulmonary:      Effort: No respiratory distress.      Breath sounds: No stridor. Rhonchi present. No wheezing or rales.   Chest:      Chest wall: No tenderness.   Musculoskeletal:      Comments: Left second toe spontaneous amputation wrapped with gauze and walking boot   Neurological:      Mental Status: She is alert.       Vitals:    05/30/25 0827   BP: 110/76   BP Location: Right arm   Patient Position: Sitting   Pulse: 86   Temp: 99 °F (37.2 °C)   TempSrc: Oral   SpO2: 97%   Height: 5' 4" (1.626 m)      Body mass index is 21.53 kg/m².        History     Past Medical History:  Past Medical History:   Diagnosis Date    Acute metabolic encephalopathy 01/02/2024    Anemia     Anticoagulant long-term use     Atrial fibrillation     Bronchitis 03/01/2017    Cancer 2016    kidney cancer    CHF (congestive heart failure), NYHA class II, chronic, systolic     CMV (cytomegalovirus) antibody positive     CVA (cerebral vascular accident) 01/03/2024    Encounter for blood transfusion     ESRD (end stage renal disease)     Essential hypertension 09/23/2015    H/O herpes simplex type 2 infection     Herpes simplex type 1 antibody positive     History of kidney cancer     s/p left nephrectomy 1/2016    Hyperparathyroidism, unspecified     Hyperuricemia without signs of inflammatory arthritis and tophaceous " disease     Hypotension due to hypovolemia 5/24/2025    Kidney stones     LGSIL (low grade squamous intraepithelial dysplasia)     Myocardiopathy 07/21/2017    Prediabetes     Proteinuria     Renal disorder     Thyroid nodule     Urate nephropathy        Past Surgical History:  Past Surgical History:   Procedure Laterality Date    ANGIOGRAPHY OF LOWER EXTREMITY Right 11/11/2024    Procedure: Angiogram Extremity Unilateral;  Surgeon: Corby Casey MD;  [facility];  Service: Vascular;  Laterality: Right;  mGy 63.61      Gycm2   11.8042        Fluro time  13.5min      Contrast amount 40ml    ANGIOPLASTY, PERIPHERAL BLOOD VESSEL Right 11/11/2024    Procedure: ANGIOPLASTY, PERIPHERAL BLOOD VESSEL;  Surgeon: Corby Casey MD;  [facility];  Service: Vascular;  Laterality: Right;    BREAST CYST EXCISION      COLONOSCOPY N/A 11/12/2015    COLONOSCOPY N/A 03/12/2021    Procedure: COLONOSCOPY;  Surgeon: Brendon Lanier MD;  Location: [facility] ENDO;  Service: Endoscopy;  Laterality: N/A;  covid test 3/9, labs prior, prep instr mailed -ml    EXCISION OF ARTERIOVENOUS FISTULA Left 09/27/2023    Procedure: EXCISION, AV FISTULA;  Surgeon: FARIDEH Muñoz III, MD;  [facility];  Service: Vascular;  Laterality: Left;  LUE AV graft excision    INSERTION OF BIVENTRICULAR IMPLANTABLE CARDIOVERTER-DEFIBRILLATOR (ICD)  04/2021    INSERTION OF TUNNELED CENTRAL VENOUS CATHETER (CVC) WITH SUBCUTANEOUS PORT N/A 01/25/2023    Procedure: INSERTION, DUAL LUMEN CATHETER WITH PORT, WITH IMAGING GUIDANCE;  Surgeon: FARIDEH Muñoz III, MD;  [facility];  Service: Peripheral Vascular;  Laterality: N/A;  possinble permcath placment    NEPHRECTOMY-LAPAROSCOPIC Left 01/12/2016    PERCUTANEOUS TRANSLUMINAL ANGIOPLASTY OF ARTERIOVENOUS FISTULA Left 04/19/2023    Procedure: PTA, AV FISTULA;  Surgeon: FARIDEH Muñoz III, MD;  Location: [facility] CATH LAB;  Service: Peripheral Vascular;   Laterality: Left;    PERITONEAL CATHETER INSERTION      Permacath insertion  01/12/2017    PLACEMENT OF ARTERIOVENOUS GRAFT  12/28/2022    Procedure: INSERTION, GRAFT, ARTERIOVENOUS;  Surgeon: FARIDEH Muñoz III, MD;  [facility];  Service: Peripheral Vascular;;    REMOVAL, GRAFT, ARTERIOVENOUS, UPPER EXTREMITY Left 01/25/2023    Procedure: REMOVAL, GRAFT, ARTERIOVENOUS, UPPER EXTREMITY;  Surgeon: FARIDEH Muñoz III, MD;  Location: [facility] OR [address] [place] [address];  Service: Peripheral Vascular;  Laterality: Left;    REVISION OF ARTERIOVENOUS FISTULA Left 05/01/2023    Procedure: REVISION, AV FISTULA;  Surgeon: FARIDEH Muñoz III, MD;  [facility];  Service: Peripheral Vascular;  Laterality: Left;  LUE AVG revision    REVISION OF PROCEDURE INVOLVING ARTERIOVENOUS GRAFT Left 12/28/2022    Procedure: REVISION, PROCEDURE INVOLVING ARTERIOVENOUS GRAFT;  Surgeon: FARIDEH Muñoz III, MD;  Location: [facility] OR [address] [place] [address];  Service: Peripheral Vascular;  Laterality: Left;    REVISION OF PROCEDURE INVOLVING ARTERIOVENOUS GRAFT Left 07/21/2023    Procedure: LEFT ARM ARTERIOVENOUS GRAFT EXCISION  AND LIGATION;  Surgeon: Obi Werner MD;  Location: [facility];  Service: Vascular;  Laterality: Left;  Left AVG excision and revision with interposition    SALPINGOOPHORECTOMY Right 2016    KJB---DAVINCI    TONSILLECTOMY      TUBAL LIGATION         Social History:  Social History[1]    Family History:  Family History   Problem Relation Name Age of Onset    Hypertension Mother      Diabetes Father      Kidney disease Father      No Known Problems Sister Sophy     Heart disease Sister Maria A     No Known Problems Sister Claudine     No Known Problems Brother Dax     No Known Problems Brother Don     Cataracts Maternal Aunt      No Known Problems Maternal Uncle      No Known Problems Paternal Aunt      No Known Problems Paternal Uncle      No Known Problems Maternal Grandmother      Diabetes Maternal Grandfather       No Known Problems Paternal Grandmother      No Known Problems Paternal Grandfather      No Known Problems Son      No Known Problems Son      No Known Problems Other      Breast cancer Neg Hx      Colon cancer Neg Hx      Cancer Neg Hx      Stroke Neg Hx      Amblyopia Neg Hx      Blindness Neg Hx      Glaucoma Neg Hx      Macular degeneration Neg Hx      Retinal detachment Neg Hx      Strabismus Neg Hx      Thyroid disease Neg Hx         Allergies and Medications: (updated and reviewed)  Review of patient's allergies indicates:   Allergen Reactions    Carvedilol Other (See Comments)     Nausea/vomiting    Allopurinol      Other reaction(s): abnormal transaminases     Current Medications[2]    Patient Care Team:  Sowmya Mccain MD as PCP - General (Internal Medicine)  Ben Rivera MD as Consulting Physician (Hematology and Oncology)  Leora Louis MD (Cardiology)  Lulu Le LPN as Licensed Practical Nurse  Alexandra as Post Acute Care Provider (Dialysis Center)  Claude Allan MD as Consulting Physician (Nephrology)  [facility] (Home Health Services)         - The patient is given an After Visit Summary that lists all medications with directions, allergies, education, orders placed during this encounter and follow-up instructions.      - I have reviewed the patient's medical information including past medical, family, and social history sections including the medications and allergies.      - We discussed the patient's current medications.     This note was created by combination of typed  and MModal dictation.  Transcription errors may be present.  If there are any questions, please contact me.       Katheryn Sargent NP                      [1]   Social History  Socioeconomic History    Marital status:     Number of children: 2   Occupational History    Occupation: [employer]     Employer: [id number]   Tobacco Use    Smoking status:  Never     Passive exposure: Never    Smokeless tobacco: Never   Substance and Sexual Activity    Alcohol use: No    Drug use: Not Currently     Types: Hydrocodone    Sexual activity: Not Currently     Social Drivers of Health     Financial Resource Strain: Low Risk  (5/25/2025)    Overall Financial Resource Strain (CARDIA)     Difficulty of Paying Living Expenses: Not hard at all   Food Insecurity: No Food Insecurity (5/25/2025)    Hunger Vital Sign     Worried About Running Out of Food in the Last Year: Never true     Ran Out of Food in the Last Year: Never true   Transportation Needs: No Transportation Needs (5/25/2025)    PRAPARE - Transportation     Lack of Transportation (Medical): No     Lack of Transportation (Non-Medical): No   Physical Activity: Inactive (9/28/2023)    Exercise Vital Sign     Days of Exercise per Week: 0 days     Minutes of Exercise per Session: 0 min   Stress: No Stress Concern Present (5/25/2025)    [employer] - Occupational Stress Questionnaire     Feeling of Stress : Not at all   Housing Stability: Low Risk  (5/25/2025)    Received from [facility]    Housing Stability Vital Sign     Unable to Pay for Housing in the Last Year: No     Number of Times Moved in the Last Year: 1     Homeless in the Last Year: No   [2]   Current Outpatient Medications   Medication Sig Dispense Refill    acetaminophen (TYLENOL) 500 MG tablet Take 1 tablet (500 mg total) by mouth every 4 (four) hours as needed for Pain or Temperature greater than (100.5 or greater). 30 tablet 0    apixaban (ELIQUIS) 2.5 mg Tab Take 1 tablet (2.5 mg total) by mouth 2 (two) times daily. 60 tablet 5    aspirin (ECOTRIN) 81 MG EC tablet Take 1 tablet (81 mg total) by mouth once daily. 90 tablet 2    atorvastatin (LIPITOR) 40 MG tablet TAKE 1 TABLET BY MOUTH EVERY DAY 90 tablet 0    levothyroxine (SYNTHROID) 75 MCG tablet Take 1 tablet (75 mcg total) by mouth once daily. 90 tablet 2     lidocaine-prilocaine (EMLA) cream APPLY ATLEAST 30 MINUTES BEFORE TREATMENT 3 TIMES A WEEK 30 g 11    metoprolol succinate (TOPROL-XL) 25 MG 24 hr tablet TAKE 1 TABLET BY MOUTH EVERY DAY 90 tablet 2    mirtazapine (REMERON) 7.5 MG Tab Take 1 tablet (7.5 mg total) by mouth every evening. 90 tablet 2    naloxone (NARCAN) 1 mg/mL injection 2 mg (1 mg per nostril) by Nasal route as needed for opioid overdose; may repeat in 3 to 5 minutes if not effective. Call 911 2 mL 11    doxycycline (VIBRAMYCIN) 100 MG Cap Take 1 capsule (100 mg total) by mouth every 12 (twelve) hours. for 10 days 20 capsule 0     No current facility-administered medications for this visit.     Facility-Administered Medications Ordered in Other Visits   Medication Dose Route Frequency Provider Last Rate Last Admin    0.9%  NaCl infusion   Intravenous Continuous Soni Bhatti MD   Stopped at 01/29/25 8419

## 2025-06-02 ENCOUNTER — HOSPITAL ENCOUNTER (OUTPATIENT)
Dept: WOUND CARE | Facility: HOSPITAL | Age: 60
Discharge: HOME | End: 2025-06-02
Attending: SURGERY
Payer: MEDICARE

## 2025-06-02 VITALS — TEMPERATURE: 98 F | SYSTOLIC BLOOD PRESSURE: 136 MMHG | DIASTOLIC BLOOD PRESSURE: 70 MMHG | HEART RATE: 90 BPM

## 2025-06-02 DIAGNOSIS — Z99.2 ESRD (END STAGE RENAL DISEASE) ON DIALYSIS: ICD-10-CM

## 2025-06-02 DIAGNOSIS — I96 GANGRENE OF TOE OF RIGHT FOOT: ICD-10-CM

## 2025-06-02 DIAGNOSIS — N18.6 ESRD (END STAGE RENAL DISEASE) ON DIALYSIS: ICD-10-CM

## 2025-06-02 DIAGNOSIS — I96 DRY GANGRENE: ICD-10-CM

## 2025-06-02 DIAGNOSIS — L97.529 ISCHEMIC TOE ULCER, LEFT, WITH UNSPECIFIED SEVERITY: Primary | ICD-10-CM

## 2025-06-02 DIAGNOSIS — C64.9 METASTATIC RENAL CELL CARCINOMA, UNSPECIFIED LATERALITY: ICD-10-CM

## 2025-06-02 DIAGNOSIS — L89.893 PRESSURE ULCER OF RIGHT LEG, STAGE 3: ICD-10-CM

## 2025-06-02 DIAGNOSIS — I73.9 PAD (PERIPHERAL ARTERY DISEASE): ICD-10-CM

## 2025-06-02 PROCEDURE — 99213 OFFICE O/P EST LOW 20 MIN: CPT | Performed by: INTERNAL MEDICINE

## 2025-06-02 PROCEDURE — 99214 OFFICE O/P EST MOD 30 MIN: CPT | Mod: GV,,, | Performed by: INTERNAL MEDICINE

## 2025-06-04 ENCOUNTER — TELEPHONE (OUTPATIENT)
Dept: VASCULAR SURGERY | Facility: CLINIC | Age: 60
End: 2025-06-04
Payer: MEDICARE

## 2025-06-06 ENCOUNTER — TELEPHONE (OUTPATIENT)
Dept: VASCULAR SURGERY | Facility: CLINIC | Age: 60
End: 2025-06-06
Payer: MEDICARE

## 2025-06-16 ENCOUNTER — HOSPITAL ENCOUNTER (OUTPATIENT)
Dept: RADIOLOGY | Facility: HOSPITAL | Age: 60
Discharge: HOME | End: 2025-06-16
Attending: STUDENT IN AN ORGANIZED HEALTH CARE EDUCATION/TRAINING PROGRAM
Payer: MEDICARE

## 2025-06-16 DIAGNOSIS — C64.2 METASTATIC RENAL CELL CARCINOMA, LEFT: ICD-10-CM

## 2025-06-16 PROCEDURE — 74176 CT ABD & PELVIS W/O CONTRAST: CPT | Mod: TC

## 2025-06-18 ENCOUNTER — TELEPHONE (OUTPATIENT)
Dept: FAMILY MEDICINE | Facility: CLINIC | Age: 60
End: 2025-06-18
Payer: MEDICARE

## 2025-06-18 ENCOUNTER — RESULTS FOLLOW-UP (OUTPATIENT)
Dept: FAMILY MEDICINE | Facility: CLINIC | Age: 60
End: 2025-06-18

## 2025-06-18 ENCOUNTER — OFFICE VISIT (OUTPATIENT)
Dept: HEMATOLOGY/ONCOLOGY | Facility: CLINIC | Age: 60
End: 2025-06-18
Attending: STUDENT IN AN ORGANIZED HEALTH CARE EDUCATION/TRAINING PROGRAM
Payer: MEDICARE

## 2025-06-18 VITALS
OXYGEN SATURATION: 95 % | SYSTOLIC BLOOD PRESSURE: 132 MMHG | BODY MASS INDEX: 20.66 KG/M2 | TEMPERATURE: 99 F | HEIGHT: 64 IN | HEART RATE: 85 BPM | DIASTOLIC BLOOD PRESSURE: 69 MMHG | WEIGHT: 121 LBS | RESPIRATION RATE: 18 BRPM

## 2025-06-18 DIAGNOSIS — N18.6 ESRD (END STAGE RENAL DISEASE) ON DIALYSIS: ICD-10-CM

## 2025-06-18 DIAGNOSIS — Z99.2 ESRD (END STAGE RENAL DISEASE) ON DIALYSIS: ICD-10-CM

## 2025-06-18 DIAGNOSIS — I51.89 COMBINED SYSTOLIC AND DIASTOLIC CARDIAC DYSFUNCTION: ICD-10-CM

## 2025-06-18 DIAGNOSIS — C64.2 RENAL CELL CARCINOMA OF LEFT KIDNEY: Primary | ICD-10-CM

## 2025-06-18 DIAGNOSIS — C64.2 METASTATIC RENAL CELL CARCINOMA, LEFT: ICD-10-CM

## 2025-06-18 DIAGNOSIS — Z90.5 S/P NEPHRECTOMY: ICD-10-CM

## 2025-06-18 PROCEDURE — 99999 PR PBB SHADOW E&M-EST. PATIENT-LVL IV: CPT | Mod: PBBFAC,,, | Performed by: INTERNAL MEDICINE

## 2025-06-18 PROCEDURE — 99214 OFFICE O/P EST MOD 30 MIN: CPT | Mod: PBBFAC | Performed by: INTERNAL MEDICINE

## 2025-06-18 RX ORDER — ONDANSETRON 8 MG/1
8 TABLET, ORALLY DISINTEGRATING ORAL EVERY 8 HOURS PRN
Qty: 30 TABLET | Refills: 1 | Status: SHIPPED | OUTPATIENT
Start: 2025-06-18 | End: 2026-06-18

## 2025-06-18 NOTE — TELEPHONE ENCOUNTER
----- Message from Katheryn Sargent NP sent at 6/18/2025 11:27 AM CDT -----  Please let patient know that her cholesterol panel came back normal. Thanks  ----- Message -----  From: Lab, Background User  Sent: 6/16/2025  12:14 PM CDT  To: Katheryn Sargent NP

## 2025-06-18 NOTE — PROGRESS NOTES
"  Subjective:       Patient ID: Eva Bloom is a 59 y.o. female.    Chief Complaint: RCC    HPI     59 y.o.female to clinic for follow up for metastatic RCC. Patient was previously on hospice but now not enrolled as she wants to continue dialysis. She is feeling stronger, remains off cabozantinib. Pt still is on ESRD with HD every TTS.     ECOG 3. She is accompanied with her . She presents in a wheelchair.      Oncologic History (From Chart and Patient):  Eva Bloom is a 59 y.o.female with ESRD on HD who was found to have two L renal masses. She underwent L lap nephrectomy on 1/12/16. 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 Margins were negative.  She healed from surgery well, but then developed a large ovarian mass.  This was removed by gyn along with multiple sites of metastatic disease in the pelvis.  Path was c/w recurrence of RCC.     11/20/16 Pelvic ultrasound reveals "Large heterogeneous cystic and solid mass which appears to arise from the right ovary with worrisome imaging features for malignancy.  Differential diagnosis includes malignant tumors such as serous or mucinous cystadenocarcinoma.  It is important to note that the patient is at risk for ovarian torsion due to the size of the mass.Multiple uterine fibroids are identified with the largest in the uterine fundus."    11/22/16 pathology reveals "FINAL PATHOLOGIC DIAGNOSIS-RIGHT OVARY AND FALLOPIAN TUBE, RIGHT SALPINGO-OOPHORECTOMY:  -Positive for malignancy, high grade carcinoma morphologically and immunohistochemically consistent with metastasis from the patient's known renal cell carcinoma"    Review of Systems   Constitutional:  Positive for activity change, appetite change, fatigue and unexpected weight change. Negative for chills and fever.   HENT:  Negative for congestion, hearing loss, mouth sores, " postnasal drip, sore throat, tinnitus and voice change.    Eyes:  Negative for pain and visual disturbance.   Respiratory:  Negative for cough, shortness of breath and wheezing.    Cardiovascular:  Negative for chest pain, palpitations and leg swelling.   Gastrointestinal:  Negative for abdominal pain, constipation, diarrhea, nausea and vomiting.   Endocrine: Negative for cold intolerance and heat intolerance.   Genitourinary:  Negative for difficulty urinating, dyspareunia, dysuria, frequency, menstrual problem, urgency, vaginal bleeding, vaginal discharge and vaginal pain.   Musculoskeletal:  Negative for arthralgias and myalgias.        LE ulcerations and pain    Skin:  Negative for color change, rash and wound.   Allergic/Immunologic: Negative for environmental allergies and food allergies.   Neurological:  Negative for weakness, numbness and headaches.   Hematological:  Negative for adenopathy. Does not bruise/bleed easily.   Psychiatric/Behavioral:  Negative for agitation, confusion, hallucinations and sleep disturbance. The patient is not nervous/anxious.    All other systems reviewed and are negative.      Allergies:  Review of patient's allergies indicates:   Allergen Reactions    Allopurinol      Other reaction(s): abnormal transaminases       Medications:  Current Outpatient Medications   Medication Sig Dispense Refill    acetaminophen (TYLENOL) 500 MG tablet Take 1 tablet (500 mg total) by mouth every 4 (four) hours as needed for Pain or Temperature greater than (100.5 or greater). 30 tablet 0    apixaban (ELIQUIS) 2.5 mg Tab Take 1 tablet (2.5 mg total) by mouth 2 (two) times daily. 60 tablet 5    aspirin (ECOTRIN) 81 MG EC tablet Take 1 tablet (81 mg total) by mouth once daily. 90 tablet 2    atorvastatin (LIPITOR) 40 MG tablet TAKE 1 TABLET BY MOUTH EVERY DAY 90 tablet 0    levothyroxine (SYNTHROID) 75 MCG tablet Take 1 tablet (75 mcg total) by mouth once daily. 90 tablet 2    lidocaine-prilocaine  (EMLA) cream APPLY ATLEAST 30 MINUTES BEFORE TREATMENT 3 TIMES A WEEK 30 g 11    metoprolol succinate (TOPROL-XL) 25 MG 24 hr tablet TAKE 1 TABLET BY MOUTH EVERY DAY 90 tablet 2    mirtazapine (REMERON) 7.5 MG Tab Take 1 tablet (7.5 mg total) by mouth every evening. 90 tablet 2    naloxone (NARCAN) 1 mg/mL injection 2 mg (1 mg per nostril) by Nasal route as needed for opioid overdose; may repeat in 3 to 5 minutes if not effective. Call 911 2 mL 11     No current facility-administered medications for this visit.     Facility-Administered Medications Ordered in Other Visits   Medication Dose Route Frequency Provider Last Rate Last Admin    0.9%  NaCl infusion   Intravenous Continuous Soni Bhatti MD   Stopped at 01/29/25 1427       PMH:  Past Medical History:   Diagnosis Date    Acute metabolic encephalopathy 01/02/2024    Anemia     Anticoagulant long-term use     Atrial fibrillation     Bronchitis 03/01/2017    Cancer 2016    kidney cancer    CHF (congestive heart failure), NYHA class II, chronic, systolic     CMV (cytomegalovirus) antibody positive     CVA (cerebral vascular accident) 01/03/2024    Encounter for blood transfusion     ESRD (end stage renal disease)     Essential hypertension 09/23/2015    H/O herpes simplex type 2 infection     Herpes simplex type 1 antibody positive     History of kidney cancer     s/p left nephrectomy 1/2016    Hyperparathyroidism, unspecified     Hyperuricemia without signs of inflammatory arthritis and tophaceous disease     Hypotension due to hypovolemia 5/24/2025    Kidney stones     LGSIL (low grade squamous intraepithelial dysplasia)     Myocardiopathy 07/21/2017    Prediabetes     Proteinuria     Renal disorder     Thyroid nodule     Urate nephropathy        PSH:  Past Surgical History:   Procedure Laterality Date    ANGIOGRAPHY OF LOWER EXTREMITY Right 11/11/2024    Procedure: Angiogram Extremity Unilateral;  Surgeon: Corby Casey MD;  Location: [facility] OR [facility]  FLR;  Service: Vascular;  Laterality: Right;  mGy 63.61      Gycm2   11.8042        Fluro time  13.5min      Contrast amount 40ml    ANGIOPLASTY, PERIPHERAL BLOOD VESSEL Right 11/11/2024    Procedure: ANGIOPLASTY, PERIPHERAL BLOOD VESSEL;  Surgeon: Corby Casey MD;  Location: [facility] OR 2ND FLR;  Service: Vascular;  Laterality: Right;    BREAST CYST EXCISION      COLONOSCOPY N/A 11/12/2015    COLONOSCOPY N/A 03/12/2021    Procedure: COLONOSCOPY;  Surgeon: Brendon Lanier MD;  Location: [facility] ENDO;  Service: Endoscopy;  Laterality: N/A;  covid test 3/9, labs prior, prep instr mailed -ml    EXCISION OF ARTERIOVENOUS FISTULA Left 09/27/2023    Procedure: EXCISION, AV FISTULA;  Surgeon: FARIDEH Muñoz III, MD;  [facility];  Service: Vascular;  Laterality: Left;  LUE AV graft excision    INSERTION OF BIVENTRICULAR IMPLANTABLE CARDIOVERTER-DEFIBRILLATOR (ICD)  04/2021    INSERTION OF TUNNELED CENTRAL VENOUS CATHETER (CVC) WITH SUBCUTANEOUS PORT N/A 01/25/2023    Procedure: INSERTION, DUAL LUMEN CATHETER WITH PORT, WITH IMAGING GUIDANCE;  Surgeon: FARIDEH Muñoz III, MD;  Location: [facility] OR [facility] FLR;  Service: Peripheral Vascular;  Laterality: N/A;  possinble permcath placment    NEPHRECTOMY-LAPAROSCOPIC Left 01/12/2016    PERCUTANEOUS TRANSLUMINAL ANGIOPLASTY OF ARTERIOVENOUS FISTULA Left 04/19/2023    Procedure: PTA, AV FISTULA;  Surgeon: FARIDEH Muñoz III, MD;  Location: [facility] CATH LAB;  Service: Peripheral Vascular;  Laterality: Left;    PERITONEAL CATHETER INSERTION      Permacath insertion  01/12/2017    PLACEMENT OF ARTERIOVENOUS GRAFT  12/28/2022    Procedure: INSERTION, GRAFT, ARTERIOVENOUS;  Surgeon: FARIDEH Muñoz III, MD;  Location: [facility] OR [facility] FLR;  Service: Peripheral Vascular;;    REMOVAL, GRAFT, ARTERIOVENOUS, UPPER EXTREMITY Left 01/25/2023    Procedure: REMOVAL, GRAFT, ARTERIOVENOUS, UPPER EXTREMITY;  Surgeon: FARIDEH Muñoz III, MD;  Location: [facility] OR [facility] FLR;  Service:  Peripheral Vascular;  Laterality: Left;    REVISION OF ARTERIOVENOUS FISTULA Left 05/01/2023    Procedure: REVISION, AV FISTULA;  Surgeon: FARIDEH Muñoz III, MD;  [facility];  Service: Peripheral Vascular;  Laterality: Left;  LUE AVG revision    REVISION OF PROCEDURE INVOLVING ARTERIOVENOUS GRAFT Left 12/28/2022    Procedure: REVISION, PROCEDURE INVOLVING ARTERIOVENOUS GRAFT;  Surgeon: FARIDEH Muñoz III, MD;  Location: [facility] OR [facility] FLR;  Service: Peripheral Vascular;  Laterality: Left;    REVISION OF PROCEDURE INVOLVING ARTERIOVENOUS GRAFT Left 07/21/2023    Procedure: LEFT ARM ARTERIOVENOUS GRAFT EXCISION  AND LIGATION;  Surgeon: Obi Werner MD;  Location: [facility] OR;  Service: Vascular;  Laterality: Left;  Left AVG excision and revision with interposition    SALPINGOOPHORECTOMY Right 2016    KJB---DAVINCI    TONSILLECTOMY      TUBAL LIGATION         FamHx:  Family History   Problem Relation Name Age of Onset    Hypertension Mother      Diabetes Father      Kidney disease Father      No Known Problems Sister Sophy     Heart disease Sister Maria A     No Known Problems Sister Claudine     No Known Problems Brother Dax     No Known Problems Brother Don     Cataracts Maternal Aunt      No Known Problems Maternal Uncle      No Known Problems Paternal Aunt      No Known Problems Paternal Uncle      No Known Problems Maternal Grandmother      Diabetes Maternal Grandfather      No Known Problems Paternal Grandmother      No Known Problems Paternal Grandfather      No Known Problems Son      No Known Problems Son      No Known Problems Other      Breast cancer Neg Hx      Colon cancer Neg Hx      Cancer Neg Hx      Stroke Neg Hx      Amblyopia Neg Hx      Blindness Neg Hx      Glaucoma Neg Hx      Macular degeneration Neg Hx      Retinal detachment Neg Hx      Strabismus Neg Hx      Thyroid disease Neg Hx         SocHx:  Social History     Socioeconomic History    Marital status:      Number of children: 2   Occupational History    Occupation: [employer]     Employer: [id number]   Tobacco Use    Smoking status: Never     Passive exposure: Never    Smokeless tobacco: Never   Substance and Sexual Activity    Alcohol use: No    Drug use: Not Currently     Types: Hydrocodone    Sexual activity: Not Currently     Social Drivers of Health     Financial Resource Strain: Low Risk  (5/25/2025)    Overall Financial Resource Strain (CARDIA)     Difficulty of Paying Living Expenses: Not hard at all   Food Insecurity: No Food Insecurity (5/25/2025)    Hunger Vital Sign     Worried About Running Out of Food in the Last Year: Never true     Ran Out of Food in the Last Year: Never true   Transportation Needs: No Transportation Needs (5/25/2025)    PRAPARE - Transportation     Lack of Transportation (Medical): No     Lack of Transportation (Non-Medical): No   Physical Activity: Inactive (9/28/2023)    Exercise Vital Sign     Days of Exercise per Week: 0 days     Minutes of Exercise per Session: 0 min   Stress: No Stress Concern Present (5/25/2025)    [place] [employer] - Occupational Stress Questionnaire     Feeling of Stress : Not at all   Housing Stability: Unknown (5/25/2025)    Housing Stability Vital Sign     Unable to Pay for Housing in the Last Year: No     Homeless in the Last Year: No         Objective:     Vitals:    06/18/25 1120   BP: 132/69   Pulse: 85   Resp: 18   Temp: 98.8 °F (37.1 °C)           Physical Exam  Vitals and nursing note reviewed.   Constitutional:       General: She is not in acute distress.     Appearance: She is well-developed.      Comments: in wheelchair   HENT:      Head: Normocephalic and atraumatic.      Nose: Nose normal.      Mouth/Throat:      Pharynx: No oropharyngeal exudate.   Eyes:      General:         Right eye: No discharge.         Left eye: No discharge.      Conjunctiva/sclera: Conjunctivae normal.      Pupils: Pupils are equal, round,  and reactive to light.   Neck:      Trachea: No tracheal deviation.   Cardiovascular:      Comments: No swelling to bilateral lower extremities.   Musculoskeletal:         General: No tenderness. Normal range of motion.      Comments:      Skin:     General: Skin is warm and dry.      Coloration: Skin is not pale.      Findings: No erythema or rash.      Comments: Ulcerations on LE   Neurological:      Mental Status: She is alert and oriented to person, place, and time.   Psychiatric:         Behavior: Behavior normal.         Thought Content: Thought content normal.        LABS:  WBC   Date Value Ref Range Status   06/16/2025 5.55 3.90 - 12.70 K/uL Final     Hemoglobin   Date Value Ref Range Status   06/17/2025 7.9 (L) 12.0 - 16.0 g/dL Final   02/15/2025 13.6 12.0 - 16.0 g/dL Final     POC Hematocrit   Date Value Ref Range Status   10/25/2024 41 36 - 54 %PCV Final     Hematocrit   Date Value Ref Range Status   02/15/2025 44.8 37.0 - 48.5 % Final     HCT   Date Value Ref Range Status   06/16/2025 27.2 (L) 37.0 - 48.5 % Final     Platelet Count   Date Value Ref Range Status   06/16/2025 223 150 - 450 K/uL Final     Platelets   Date Value Ref Range Status   02/15/2025 212 150 - 450 K/uL Final       Chemistry        Component Value Date/Time     06/16/2025 1149     05/26/2025 0459     02/15/2025 0847    K 4.8 06/16/2025 1149    K 4.5 05/26/2025 0459    K 3.8 02/15/2025 0847     06/16/2025 1149    CL 95 02/15/2025 0847    CO2 24 06/16/2025 1149    CO2 27 05/26/2025 0459    CO2 26 02/15/2025 0847    BUN 66 (H) 06/16/2025 1149    CREATININE 7.3 (H) 06/16/2025 1149    GLU 89 06/16/2025 1149    GLU 74 02/15/2025 0847        Component Value Date/Time    CALCIUM 8.8 06/16/2025 1149    CALCIUM 7.9 (L) 05/26/2025 0459    CALCIUM 9.9 02/15/2025 0847    ALKPHOS 1,652 (H) 06/16/2025 1149    ALKPHOS 780 (H) 02/15/2025 0847    AST 28 06/16/2025 1149    AST 32 05/26/2025 0459    AST 35 02/15/2025 0847    ALT  23 06/16/2025 1149    ALT 17 05/26/2025 0459    ALT 20 02/15/2025 0847    BILITOT 0.6 06/16/2025 1149    BILITOT 0.8 05/26/2025 0459    BILITOT 1.0 02/15/2025 0847        CT Chest Abdomen Pelvis Without Contrast (XPD)  Narrative: EXAMINATION:  CT CHEST ABDOMEN PELVIS WITHOUT CONTRAST(XPD)    CLINICAL HISTORY:  Kidney cancer, staging;  Malignant neoplasm of left kidney, except renal pelvis    FINDINGS:  Comparison is 05/26/2025    No mediastinal, hilar, or axillary adenopathy is seen.  There are coronary artery calcifications.  There is cardiomegaly.  There is a hepatomegaly.  There is cholelithiasis with multiple gallstones in the gallbladder.  No focal liver lesion is seen.  Spleen is prominent.  Stomach, pancreas, and duodenum show nothing unusual.  The kidneys are small with thin parenchyma consistent with end-stage renal disease.  The adrenal glands are not enlarged.  The left kidney has been removed.  Again seen is a mesenteric mass measuring 3.5 cm.  There are vascular calcifications.  No significant retroperitoneal adenopathy is seen.  Bowel loops and appendix are unremarkable.  There are small pulmonary micro nodules unchanged from the prior study.  There are calcified lung granulomas.  Bones demonstrate changes of renal osteodystrophy.  Impression: Mesenteric mass is fairly stable and has small calcifications within it.  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  Nuclear medicine PET-CT could be helpful.    Coronary artery calcifications.    Cardiomegaly and hepatomegaly.    Cholelithiasis.    Left kidney removed, end-stage renal disease of the right kidney within parenchyma and multiple right renal cysts.    Small pulmonary micro nodules unchanged.    Renal osteodystrophy.    Electronically signed by: Gunnar Miranda MD  Date:    06/18/2025  Time:    09:48      Assessment:       1. Renal cell carcinoma of left kidney    2. Metastatic renal cell carcinoma, left     3. S/p nephrectomy left    4. ESRD (end stage renal disease) on dialysis    5. Combined systolic and diastolic cardiac dysfunction              Plan:        Cancer Staging   Renal cell carcinoma of left kidney  Staging form: Kidney, AJCC 8th Edition  - Pathologic stage from 1/12/2016: Stage Unknown (pT1b(2), pNX, cM0) - Unsigned  - Clinical stage from 11/22/2016: Stage IV (rcTX, cN0, pM1) - Signed by Liset Ngo NP on 9/13/2023    Pt has had SD for long time on [place], and last scans were largely stable, however, no longer able to tolerate therapy due to comorbidities (HD/ESRD, etc) and LE infection/wounds.  She was most recently enrolled on hospice but was then told she was not allowed to continue dialysis. Long discussion with patient and her accompanying  regarding current clinical status and that she does not qualify for further RCC treatment. They are in agreement and would like see current status of cancer activity, which is not unreasonable.     Scans previously showed slow enlargement of retroperitoneal mass which is not surprising that we are currently holding therapy. Updated scans show stable mesenteric mass and some mildly enlarged pelvic nodes which could be reactive from vascular ischemia and foot lesions. Scan currently stable.  Educated patient and family regarding very limited treatment options given above.     RTC 4 months with CT scan, labs (CBC,CMP) on [facility] [place], and to see Dr. Rivera a few days after.  Appts ONLY on Mon/Wed/Fri (pt has dialysis on Tues and Thursday).      Patient is in agreement with the proposed treatment plan. All questions were answered to the patient's satisfaction. Pt knows to call clinic if anything is needed before the next clinic visit.    I, Dr. Ben Rivera, personally spent 40 mins during this encounter.     Ben Rivera M.D., M.S., F.A.C.P.  Hematology/Oncology Attending  [facility]             Med Onc Chart Routing      Follow  up with physician . RTC 4 months with CT CAP, labs (CBC,CMP) on [facility], and to see Dr. Rivera a few days after.  Appts ONLY on Mon/Wed/Fri (pt has dialysis on Tues and Thursday).   Follow up with [facility]    Infusion scheduling note    Injection scheduling note    Labs    Imaging    Pharmacy appointment    Other referrals

## 2025-06-26 ENCOUNTER — HOSPITAL ENCOUNTER (OUTPATIENT)
Dept: RADIOLOGY | Facility: HOSPITAL | Age: 60
Discharge: HOME | End: 2025-06-26
Payer: MEDICARE

## 2025-06-26 ENCOUNTER — OFFICE VISIT (OUTPATIENT)
Dept: FAMILY MEDICINE | Facility: CLINIC | Age: 60
End: 2025-06-26
Payer: MEDICARE

## 2025-06-26 VITALS
DIASTOLIC BLOOD PRESSURE: 62 MMHG | HEART RATE: 84 BPM | WEIGHT: 117 LBS | BODY MASS INDEX: 19.97 KG/M2 | OXYGEN SATURATION: 97 % | SYSTOLIC BLOOD PRESSURE: 106 MMHG | HEIGHT: 64 IN | TEMPERATURE: 99 F

## 2025-06-26 DIAGNOSIS — M25.531 RIGHT WRIST PAIN: Primary | ICD-10-CM

## 2025-06-26 DIAGNOSIS — I48.0 PAF (PAROXYSMAL ATRIAL FIBRILLATION): ICD-10-CM

## 2025-06-26 DIAGNOSIS — Z99.2 ESRD (END STAGE RENAL DISEASE) ON DIALYSIS: Chronic | ICD-10-CM

## 2025-06-26 DIAGNOSIS — M25.531 RIGHT WRIST PAIN: ICD-10-CM

## 2025-06-26 DIAGNOSIS — N18.6 ESRD (END STAGE RENAL DISEASE) ON DIALYSIS: Chronic | ICD-10-CM

## 2025-06-26 PROCEDURE — 99215 OFFICE O/P EST HI 40 MIN: CPT | Mod: PBBFAC,25,PO

## 2025-06-26 PROCEDURE — 99999 PR PBB SHADOW E&M-EST. PATIENT-LVL V: CPT | Mod: PBBFAC,,,

## 2025-06-26 PROCEDURE — 73130 X-RAY EXAM OF HAND: CPT | Mod: TC,FY,PO,RT

## 2025-06-26 RX ORDER — OXYCODONE AND ACETAMINOPHEN 5; 325 MG/1; MG/1
1 TABLET ORAL EVERY 6 HOURS PRN
Qty: 12 TABLET | Refills: 0 | Status: SHIPPED | OUTPATIENT
Start: 2025-06-26

## 2025-06-26 NOTE — PROGRESS NOTES
HPI     Chief Complaint:  Chief Complaint   Patient presents with    Wrist Pain       Eva Bloom is a 59 y.o. female with multiple medical diagnoses as listed in the medical history and problem list that presents for   Chief Complaint   Patient presents with    Wrist Pain    .     Patient is know to me with her last appointment with me on 5/30/2025.     Pt presents for wrist pain.  Wrist Pain   The pain is present in the right wrist. This is a new problem. The current episode started 1 to 4 weeks ago. There has been no history of extremity trauma. The problem occurs constantly. The pain is at a severity of 10/10. The pain is severe. Associated symptoms include numbness, stiffness and tingling. Pertinent negatives include no joint swelling. She has tried acetaminophen and OTC ointments for the symptoms.       Assessment & Plan     Problem List Items Addressed This Visit       ESRD (end stage renal disease) on dialysis (Chronic)  Stable. Followed by nephrology. Dialysis three times weekly.    Overview   - Tuesday, Thursday, and Saturday  - Chelsie Hart, nephrology         PAF (paroxysmal atrial fibrillation) (Chronic)  Stable. Followed by cardiology. The current medical regimen is effective;  continue present plan and medications.       Other Visit Diagnoses         Right wrist pain    -  Primary  Right wrist pain for the past several weeks.Will order right wrist and hand x-rays.  Will treat with short-term percocet. Will refer to hand surgery.    Relevant Medications    oxyCODONE-acetaminophen (PERCOCET) 5-325 mg per tablet    Other Relevant Orders    X-Ray Hand 3 view Right (Completed)    Ambulatory referral/consult to Hand Surgery              --------------------------------------------      Health Maintenance:  Health Maintenance         Date Due Completion Date    DEXA Scan Never done ---    Cervical Cancer Screening 11/16/2023 11/16/2018    COVID-19 Vaccine (8 - 2024-25 season) 12/25/2024  "10/30/2024    TETANUS VACCINE 09/23/2025 9/23/2015    Lipid Panel 06/16/2026 6/16/2025    Mammogram 02/26/2027 2/26/2025    Colorectal Cancer Screening 03/12/2028 3/12/2021            Health maintenance reviewed    Follow Up:  Follow up in about 8 months (around 2/26/2026).    Exam     Review of Systems:  (as noted above)  Review of Systems   Musculoskeletal:  Positive for stiffness.   Neurological:  Positive for tingling and numbness.       Physical Exam:   Physical Exam  Constitutional:       General: She is not in acute distress.     Appearance: Normal appearance. She is not ill-appearing, toxic-appearing or diaphoretic.   Cardiovascular:      Rate and Rhythm: Normal rate.   Pulmonary:      Effort: Pulmonary effort is normal.   Musculoskeletal:      Right wrist: Tenderness and bony tenderness present. No swelling, deformity, effusion, lacerations, snuff box tenderness or crepitus. Decreased range of motion. Normal pulse.      Right hand: Tenderness present. Decreased strength. Decreased sensation.   Neurological:      Mental Status: She is alert.       Vitals:    06/26/25 1433   BP: 106/62   BP Location: Right arm   Patient Position: Sitting   Pulse: 84   Temp: 99.4 °F (37.4 °C)   TempSrc: Oral   SpO2: 97%   Weight: 53.1 kg (117 lb)   Height: 5' 4" (1.626 m)      Body mass index is 20.08 kg/m².        History     Past Medical History:  Past Medical History:   Diagnosis Date    Acute metabolic encephalopathy 01/02/2024    Anemia     Anticoagulant long-term use     Atrial fibrillation     Bronchitis 03/01/2017    Cancer 2016    kidney cancer    CHF (congestive heart failure), NYHA class II, chronic, systolic     CMV (cytomegalovirus) antibody positive     CVA (cerebral vascular accident) 01/03/2024    Encounter for blood transfusion     ESRD (end stage renal disease)     Essential hypertension 09/23/2015    H/O herpes simplex type 2 infection     Herpes simplex type 1 antibody positive     History of kidney cancer  "    s/p left nephrectomy 1/2016    Hyperparathyroidism, unspecified     Hyperuricemia without signs of inflammatory arthritis and tophaceous disease     Hypotension due to hypovolemia 5/24/2025    Kidney stones     LGSIL (low grade squamous intraepithelial dysplasia)     Myocardiopathy 07/21/2017    Prediabetes     Proteinuria     Renal disorder     Thyroid nodule     Urate nephropathy        Past Surgical History:  Past Surgical History:   Procedure Laterality Date    ANGIOGRAPHY OF LOWER EXTREMITY Right 11/11/2024    Procedure: Angiogram Extremity Unilateral;  Surgeon: Corby Casey MD;  [facility];  Service: Vascular;  Laterality: Right;  mGy 63.61      Gycm2   11.8042        Fluro time  13.5min      Contrast amount 40ml    ANGIOPLASTY, PERIPHERAL BLOOD VESSEL Right 11/11/2024    Procedure: ANGIOPLASTY, PERIPHERAL BLOOD VESSEL;  Surgeon: Corby Casey MD;  Location: [facility] OR [address] [place] [address];  Service: Vascular;  Laterality: Right;    BREAST CYST EXCISION      COLONOSCOPY N/A 11/12/2015    COLONOSCOPY N/A 03/12/2021    Procedure: COLONOSCOPY;  Surgeon: Brendon Lanier MD;  Location: [facility];  Service: Endoscopy;  Laterality: N/A;  covid test 3/9, labs prior, prep instr mailed -ml    EXCISION OF ARTERIOVENOUS FISTULA Left 09/27/2023    Procedure: EXCISION, AV FISTULA;  Surgeon: FARIDEH Muñoz III, MD;  Location: [facility] OR [address] [place] [address];  Service: Vascular;  Laterality: Left;  LUE AV graft excision    INSERTION OF BIVENTRICULAR IMPLANTABLE CARDIOVERTER-DEFIBRILLATOR (ICD)  04/2021    INSERTION OF TUNNELED CENTRAL VENOUS CATHETER (CVC) WITH SUBCUTANEOUS PORT N/A 01/25/2023    Procedure: INSERTION, DUAL LUMEN CATHETER WITH PORT, WITH IMAGING GUIDANCE;  Surgeon: FARIDEH Muñoz III, MD;  Location: [facility] OR [address] [place] [address];  Service: Peripheral Vascular;  Laterality: N/A;  possinble permcath placment    NEPHRECTOMY-LAPAROSCOPIC Left 01/12/2016    PERCUTANEOUS TRANSLUMINAL ANGIOPLASTY OF ARTERIOVENOUS FISTULA  Left 04/19/2023    Procedure: PTA, AV FISTULA;  Surgeon: FARIDEH Muñoz III, MD;  Location: [facility] CATH LAB;  Service: Peripheral Vascular;  Laterality: Left;    PERITONEAL CATHETER INSERTION      Permacath insertion  01/12/2017    PLACEMENT OF ARTERIOVENOUS GRAFT  12/28/2022    Procedure: INSERTION, GRAFT, ARTERIOVENOUS;  Surgeon: FARIDEH Muñoz III, MD;  [facility];  Service: Peripheral Vascular;;    REMOVAL, GRAFT, ARTERIOVENOUS, UPPER EXTREMITY Left 01/25/2023    Procedure: REMOVAL, GRAFT, ARTERIOVENOUS, UPPER EXTREMITY;  Surgeon: FARIDEH Muñoz III, MD;  [facility];  Service: Peripheral Vascular;  Laterality: Left;    REVISION OF ARTERIOVENOUS FISTULA Left 05/01/2023    Procedure: REVISION, AV FISTULA;  Surgeon: FARIDEH Muñoz III, MD;  [facility];  Service: Peripheral Vascular;  Laterality: Left;  LUE AVG revision    REVISION OF PROCEDURE INVOLVING ARTERIOVENOUS GRAFT Left 12/28/2022    Procedure: REVISION, PROCEDURE INVOLVING ARTERIOVENOUS GRAFT;  Surgeon: FARIDEH Muñoz III, MD;  Location: [facility] OR [address] [place] [address];  Service: Peripheral Vascular;  Laterality: Left;    REVISION OF PROCEDURE INVOLVING ARTERIOVENOUS GRAFT Left 07/21/2023    Procedure: LEFT ARM ARTERIOVENOUS GRAFT EXCISION  AND LIGATION;  Surgeon: Obi Werner MD;  Location: [facility];  Service: Vascular;  Laterality: Left;  Left AVG excision and revision with interposition    SALPINGOOPHORECTOMY Right 2016    KJB---DAVINCI    TONSILLECTOMY      TUBAL LIGATION         Social History:  Social History[1]    Family History:  Family History   Problem Relation Name Age of Onset    Hypertension Mother      Diabetes Father      Kidney disease Father      No Known Problems Sister Sophy     Heart disease Sister Maria A     No Known Problems Sister Claudine     No Known Problems Brother Dax     No Known Problems Brother Don     Cataracts Maternal Aunt      No Known Problems Maternal Uncle      No Known  Problems Paternal Aunt      No Known Problems Paternal Uncle      No Known Problems Maternal Grandmother      Diabetes Maternal Grandfather      No Known Problems Paternal Grandmother      No Known Problems Paternal Grandfather      No Known Problems Son      No Known Problems Son      No Known Problems Other      Breast cancer Neg Hx      Colon cancer Neg Hx      Cancer Neg Hx      Stroke Neg Hx      Amblyopia Neg Hx      Blindness Neg Hx      Glaucoma Neg Hx      Macular degeneration Neg Hx      Retinal detachment Neg Hx      Strabismus Neg Hx      Thyroid disease Neg Hx         Allergies and Medications: (updated and reviewed)  Review of patient's allergies indicates:   Allergen Reactions    Carvedilol Other (See Comments)     Nausea/vomiting    Allopurinol      Other reaction(s): abnormal transaminases     Current Medications[2]    Patient Care Team:  Sowmya Mccain MD as PCP - General (Internal Medicine)  Ben Rivera MD as Consulting Physician (Hematology and Oncology)  Leora Louis MD (Cardiology)  Lulu Le LPN as Licensed Practical Nurse  [facility] as Post Acute Care Provider (Dialysis Center)  Claude Allan MD as Consulting Physician (Nephrology)  Intake, [facility] (Home Health Services)  Mode Jones MD as Consulting Physician (Wound Care)         - The patient is given an After Visit Summary that lists all medications with directions, allergies, education, orders placed during this encounter and follow-up instructions.      - I have reviewed the patient's medical information including past medical, family, and social history sections including the medications and allergies.      - We discussed the patient's current medications.     This note was created by combination of typed  and MModal dictation.  Transcription errors may be present.  If there are any questions, please contact me.       Katheryn Sargent NP                      [1]    Social History  Socioeconomic History    Marital status:     Number of children: 2   Occupational History    Occupation: lucero     Employer: [id number]   Tobacco Use    Smoking status: Never     Passive exposure: Never    Smokeless tobacco: Never   Substance and Sexual Activity    Alcohol use: No    Drug use: Not Currently     Types: Hydrocodone    Sexual activity: Not Currently     Social Drivers of Health     Financial Resource Strain: Low Risk  (5/25/2025)    Overall Financial Resource Strain (CARDIA)     Difficulty of Paying Living Expenses: Not hard at all   Food Insecurity: No Food Insecurity (5/25/2025)    Hunger Vital Sign     Worried About Running Out of Food in the Last Year: Never true     Ran Out of Food in the Last Year: Never true   Transportation Needs: No Transportation Needs (5/25/2025)    PRAPARE - Transportation     Lack of Transportation (Medical): No     Lack of Transportation (Non-Medical): No   Physical Activity: Inactive (9/28/2023)    Exercise Vital Sign     Days of Exercise per Week: 0 days     Minutes of Exercise per Session: 0 min   Stress: No Stress Concern Present (5/25/2025)    [place] [employer] - Occupational Stress Questionnaire     Feeling of Stress : Not at all   Housing Stability: Low Risk  (5/25/2025)    Received from [facility]    Housing Stability Vital Sign     Unable to Pay for Housing in the Last Year: No     Number of Times Moved in the Last Year: 1     Homeless in the Last Year: No   [2]   Current Outpatient Medications   Medication Sig Dispense Refill    acetaminophen (TYLENOL) 500 MG tablet Take 1 tablet (500 mg total) by mouth every 4 (four) hours as needed for Pain or Temperature greater than (100.5 or greater). 30 tablet 0    apixaban (ELIQUIS) 2.5 mg Tab Take 1 tablet (2.5 mg total) by mouth 2 (two) times daily. 60 tablet 5    aspirin (ECOTRIN) 81 MG EC tablet Take 1 tablet (81 mg total) by mouth once daily. 90 tablet 2     atorvastatin (LIPITOR) 40 MG tablet TAKE 1 TABLET BY MOUTH EVERY DAY 90 tablet 0    levothyroxine (SYNTHROID) 75 MCG tablet Take 1 tablet (75 mcg total) by mouth once daily. 90 tablet 2    lidocaine-prilocaine (EMLA) cream APPLY ATLEAST 30 MINUTES BEFORE TREATMENT 3 TIMES A WEEK 30 g 11    metoprolol succinate (TOPROL-XL) 25 MG 24 hr tablet TAKE 1 TABLET BY MOUTH EVERY DAY 90 tablet 2    mirtazapine (REMERON) 7.5 MG Tab Take 1 tablet (7.5 mg total) by mouth every evening. 90 tablet 2    naloxone (NARCAN) 1 mg/mL injection 2 mg (1 mg per nostril) by Nasal route as needed for opioid overdose; may repeat in 3 to 5 minutes if not effective. Call 911 2 mL 11    ondansetron (ZOFRAN-ODT) 8 MG TbDL Take 1 tablet (8 mg total) by mouth every 8 (eight) hours as needed (nausea or vomiting). 30 tablet 1    oxyCODONE-acetaminophen (PERCOCET) 5-325 mg per tablet Take 1 tablet by mouth every 6 (six) hours as needed for Pain (right wrist pain). 12 tablet 0     No current facility-administered medications for this visit.     Facility-Administered Medications Ordered in Other Visits   Medication Dose Route Frequency Provider Last Rate Last Admin    0.9%  NaCl infusion   Intravenous Continuous Soni Bhatti MD   Stopped at 01/29/25 [id number]

## 2025-06-27 ENCOUNTER — HOSPITAL ENCOUNTER (OUTPATIENT)
Dept: CARDIOLOGY | Facility: HOSPITAL | Age: 60
Discharge: HOME | End: 2025-06-27
Attending: SURGERY
Payer: MEDICARE

## 2025-06-27 ENCOUNTER — RESULTS FOLLOW-UP (OUTPATIENT)
Dept: FAMILY MEDICINE | Facility: CLINIC | Age: 60
End: 2025-06-27

## 2025-06-27 ENCOUNTER — TELEPHONE (OUTPATIENT)
Dept: FAMILY MEDICINE | Facility: CLINIC | Age: 60
End: 2025-06-27
Payer: MEDICARE

## 2025-06-27 DIAGNOSIS — N18.6 ESRD (END STAGE RENAL DISEASE) ON DIALYSIS: ICD-10-CM

## 2025-06-27 DIAGNOSIS — Z99.2 ESRD (END STAGE RENAL DISEASE) ON DIALYSIS: ICD-10-CM

## 2025-06-27 LAB
LEFT ABI: 0.68
LEFT ANT TIBIAL SYS PSV: 111 CM/S
LEFT CFA PSV: 67 CM/S
LEFT EXTERNAL ILIAC PSV: 82 CM/S
LEFT PERONEAL SYS PSV: 0 CM/S
LEFT POPLITEAL PSV: 47 CM/S
LEFT POST TIBIAL SYS PSV: 45 CM/S
LEFT POSTERIOR TIBIAL: 92 MMHG
LEFT PROFUNDA SYS PSV: 68 CM/S
LEFT SUPER FEMORAL DIST SYS PSV: 70 CM/S
LEFT SUPER FEMORAL MID SYS PSV: 80 CM/S
LEFT SUPER FEMORAL OSTIAL SYS PSV: 66 CM/S
LEFT SUPER FEMORAL PROX SYS PSV: 115 CM/S
LEFT TIB/PER TRUNK SYS PSV: 84 CM/S
OHS CV LEFT LOWER EXTREMITY ABI (NO CALC): 0.68
RIGHT ANT TIBIAL SYS PSV: 89 CM/S
RIGHT ARM BP: 136 MMHG
RIGHT CFA PSV: 89 CM/S
RIGHT EXTERNAL ILLIAC PSV: 86 CM/S
RIGHT PERONEAL SYS PSV: 43 CM/S
RIGHT POPLITEAL PSV: 88 CM/S
RIGHT POST TIBIAL SYS PSV: 0 CM/S
RIGHT PROFUNDA SYS PSV: 52 CM/S
RIGHT SUPER FEMORAL DIST SYS PSV: 71 CM/S
RIGHT SUPER FEMORAL MID SYS PSV: 78 CM/S
RIGHT SUPER FEMORAL OSTIAL SYS PSV: 55 CM/S
RIGHT SUPER FEMORAL PROX SYS PSV: 105 CM/S
RIGHT TIB/PER TRUNK SYS PSV: 98 CM/S

## 2025-06-27 PROCEDURE — 93925 LOWER EXTREMITY STUDY: CPT

## 2025-06-27 PROCEDURE — 93922 UPR/L XTREMITY ART 2 LEVELS: CPT | Mod: 26,GW,, | Performed by: SURGERY

## 2025-06-27 PROCEDURE — 93922 UPR/L XTREMITY ART 2 LEVELS: CPT

## 2025-06-27 PROCEDURE — 93925 LOWER EXTREMITY STUDY: CPT | Mod: 26,GW,, | Performed by: SURGERY

## 2025-06-27 NOTE — TELEPHONE ENCOUNTER
----- Message from Katheryn Sargent NP sent at 6/27/2025 10:19 AM CDT -----  Please contact patient with x-ray that shows no fracture but does show some degenerative changes related to normal wear and tear of the joint over time which can be causing the pain. Thanks  ----- Message -----  From: Interface, Rad Results In  Sent: 6/27/2025   8:46 AM CDT  To: Katheryn Sargent NP

## 2025-06-30 ENCOUNTER — HOSPITAL ENCOUNTER (OUTPATIENT)
Dept: WOUND CARE | Facility: HOSPITAL | Age: 60
Discharge: HOME | End: 2025-06-30
Attending: SURGERY
Payer: MEDICARE

## 2025-06-30 VITALS — HEART RATE: 92 BPM | DIASTOLIC BLOOD PRESSURE: 82 MMHG | TEMPERATURE: 98 F | SYSTOLIC BLOOD PRESSURE: 162 MMHG

## 2025-06-30 DIAGNOSIS — L97.529 ISCHEMIC TOE ULCER, LEFT, WITH UNSPECIFIED SEVERITY: Primary | ICD-10-CM

## 2025-06-30 PROCEDURE — 99213 OFFICE O/P EST LOW 20 MIN: CPT | Performed by: INTERNAL MEDICINE

## 2025-06-30 PROCEDURE — 99214 OFFICE O/P EST MOD 30 MIN: CPT | Mod: GW,,, | Performed by: INTERNAL MEDICINE
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
## 2025-07-23 ENCOUNTER — TELEPHONE (OUTPATIENT)
Dept: VASCULAR SURGERY | Facility: CLINIC | Age: 60
End: 2025-07-23
Payer: MEDICARE

## 2025-07-25 ENCOUNTER — TELEPHONE (OUTPATIENT)
Dept: VASCULAR SURGERY | Facility: CLINIC | Age: 60
End: 2025-07-25
Payer: MEDICARE

## 2025-07-28 ENCOUNTER — HOSPITAL ENCOUNTER (OUTPATIENT)
Dept: WOUND CARE | Facility: HOSPITAL | Age: 60
Discharge: HOME | End: 2025-07-28
Attending: SURGERY
Payer: MEDICARE

## 2025-07-28 DIAGNOSIS — L97.529 ISCHEMIC TOE ULCER, LEFT, WITH UNSPECIFIED SEVERITY: Primary | ICD-10-CM

## 2025-07-28 PROCEDURE — 99214 OFFICE O/P EST MOD 30 MIN: CPT | Mod: GW,,, | Performed by: INTERNAL MEDICINE

## 2025-07-28 PROCEDURE — 99213 OFFICE O/P EST LOW 20 MIN: CPT | Performed by: INTERNAL MEDICINE
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
## 2025-07-29 ENCOUNTER — EXTERNAL HOME HEALTH (OUTPATIENT)
Dept: HOME HEALTH SERVICES | Facility: HOSPITAL | Age: 60
End: 2025-07-29
Payer: MEDICARE

## 2025-08-13 ENCOUNTER — TELEPHONE (OUTPATIENT)
Dept: FAMILY MEDICINE | Facility: CLINIC | Age: 60
End: 2025-08-13
Payer: MEDICARE

## 2025-08-19 ENCOUNTER — OFFICE VISIT (OUTPATIENT)
Dept: VASCULAR SURGERY | Facility: CLINIC | Age: 60
End: 2025-08-19
Payer: MEDICARE

## 2025-08-19 VITALS
BODY MASS INDEX: 19.99 KG/M2 | SYSTOLIC BLOOD PRESSURE: 88 MMHG | HEART RATE: 56 BPM | DIASTOLIC BLOOD PRESSURE: 63 MMHG | HEIGHT: 64 IN | WEIGHT: 117.06 LBS

## 2025-08-19 DIAGNOSIS — I70.223 CRITICAL LIMB ISCHEMIA OF BOTH LOWER EXTREMITIES: Primary | ICD-10-CM

## 2025-08-19 PROCEDURE — 99213 OFFICE O/P EST LOW 20 MIN: CPT | Mod: PBBFAC | Performed by: SURGERY

## 2025-08-19 PROCEDURE — 99999 PR PBB SHADOW E&M-EST. PATIENT-LVL III: CPT | Mod: PBBFAC,,, | Performed by: SURGERY

## 2025-08-22 ENCOUNTER — OFFICE VISIT (OUTPATIENT)
Dept: FAMILY MEDICINE | Facility: CLINIC | Age: 60
End: 2025-08-22
Payer: MEDICARE

## 2025-08-22 VITALS
DIASTOLIC BLOOD PRESSURE: 60 MMHG | BODY MASS INDEX: 20.49 KG/M2 | SYSTOLIC BLOOD PRESSURE: 110 MMHG | HEIGHT: 64 IN | WEIGHT: 120 LBS | TEMPERATURE: 98 F

## 2025-08-22 DIAGNOSIS — M25.561 ACUTE PAIN OF BOTH KNEES: ICD-10-CM
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
## 2025-08-22 DIAGNOSIS — M25.521 RIGHT ELBOW PAIN: ICD-10-CM

## 2025-08-22 DIAGNOSIS — M25.562 ACUTE PAIN OF BOTH KNEES: ICD-10-CM

## 2025-08-22 PROCEDURE — 99214 OFFICE O/P EST MOD 30 MIN: CPT | Mod: S$PBB,GW,,

## 2025-08-22 PROCEDURE — 99999 PR PBB SHADOW E&M-EST. PATIENT-LVL III: CPT | Mod: PBBFAC,,,

## 2025-08-22 PROCEDURE — 99213 OFFICE O/P EST LOW 20 MIN: CPT | Mod: PBBFAC,PO

## 2025-08-22 RX ORDER — METHOCARBAMOL 500 MG/1
500 TABLET, FILM COATED ORAL 4 TIMES DAILY
Qty: 40 TABLET | Refills: 1 | Status: ON HOLD | OUTPATIENT
Start: 2025-08-22 | End: 2025-09-01

## 2025-08-25 ENCOUNTER — HOSPITAL ENCOUNTER (OUTPATIENT)
Dept: WOUND CARE | Facility: HOSPITAL | Age: 60
Discharge: HOME | End: 2025-08-25
Attending: SURGERY
Payer: MEDICARE

## 2025-08-25 ENCOUNTER — TELEPHONE (OUTPATIENT)
Dept: FAMILY MEDICINE | Facility: CLINIC | Age: 60
End: 2025-08-25
Payer: MEDICARE

## 2025-08-25 DIAGNOSIS — L97.529: Primary | ICD-10-CM

## 2025-08-28 PROBLEM — I73.9 PAD (PERIPHERAL ARTERY DISEASE): Chronic | Status: ACTIVE | Noted: 2023-11-01

## 2025-08-28 PROBLEM — R04.0 EPISTAXIS: Status: ACTIVE | Noted: 2025-08-28

## 2025-08-28 PROBLEM — D64.9 ANEMIA: Status: ACTIVE | Noted: 2025-08-28

## 2025-08-28 PROBLEM — D62 ACUTE BLOOD LOSS ANEMIA: Status: ACTIVE | Noted: 2025-08-28

## 2025-08-28 PROBLEM — I27.20 PULMONARY HYPERTENSION: Chronic | Status: ACTIVE | Noted: 2017-07-21

## 2025-08-28 PROBLEM — C64.9 METASTATIC RENAL CELL CARCINOMA: Chronic | Status: ACTIVE | Noted: 2024-07-22

## 2025-08-28 PROBLEM — Z95.810 ICD (IMPLANTABLE CARDIOVERTER-DEFIBRILLATOR) IN PLACE: Chronic | Status: ACTIVE | Noted: 2021-07-15

## 2025-08-29 PROBLEM — D64.9 ANEMIA: Status: ACTIVE | Noted: 2025-08-29

## 2025-08-30 ENCOUNTER — EXTERNAL HOME HEALTH (OUTPATIENT)
Dept: HOME HEALTH SERVICES | Facility: HOSPITAL | Age: 60
End: 2025-08-30
Payer: MEDICARE

## 2025-09-02 ENCOUNTER — ANESTHESIA EVENT (OUTPATIENT)
Dept: SURGERY | Facility: HOSPITAL | Age: 60
End: 2025-09-02
Payer: MEDICARE

## 2025-09-02 PROBLEM — S72.001A CLOSED DISPLACED FRACTURE OF RIGHT FEMORAL NECK: Status: ACTIVE | Noted: 2025-09-02

## 2025-09-02 PROBLEM — D62 ACUTE BLOOD LOSS ANEMIA: Status: RESOLVED | Noted: 2025-08-28 | Resolved: 2025-09-02

## 2025-09-02 PROBLEM — M25.521 BILATERAL ELBOW JOINT PAIN: Status: ACTIVE | Noted: 2025-09-02

## 2025-09-02 PROBLEM — M25.522 BILATERAL ELBOW JOINT PAIN: Status: ACTIVE | Noted: 2025-09-02

## 2025-09-02 PROBLEM — R04.0 EPISTAXIS: Status: RESOLVED | Noted: 2025-08-28 | Resolved: 2025-09-02

## 2025-09-03 ENCOUNTER — ANESTHESIA (OUTPATIENT)
Dept: SURGERY | Facility: HOSPITAL | Age: 60
End: 2025-09-03
Payer: MEDICARE

## 2025-09-03 ENCOUNTER — TELEPHONE (OUTPATIENT)
Dept: WOUND CARE | Facility: HOSPITAL | Age: 60
End: 2025-09-03
Payer: MEDICARE

## 2025-09-03 DIAGNOSIS — E03.9 HYPOTHYROIDISM, UNSPECIFIED TYPE: ICD-10-CM

## 2025-09-03 PROCEDURE — 63600175 PHARM REV CODE 636 W HCPCS: Performed by: HOSPITALIST

## 2025-09-03 PROCEDURE — 63600175 PHARM REV CODE 636 W HCPCS: Performed by: STUDENT IN AN ORGANIZED HEALTH CARE EDUCATION/TRAINING PROGRAM

## 2025-09-03 PROCEDURE — 25000003 PHARM REV CODE 250: Performed by: STUDENT IN AN ORGANIZED HEALTH CARE EDUCATION/TRAINING PROGRAM

## 2025-09-03 PROCEDURE — 25000003 PHARM REV CODE 250: Performed by: NURSE ANESTHETIST, CERTIFIED REGISTERED

## 2025-09-03 RX ORDER — ETOMIDATE 2 MG/ML
INJECTION INTRAVENOUS
Status: DISCONTINUED | OUTPATIENT
Start: 2025-09-03 | End: 2025-09-03

## 2025-09-03 RX ORDER — DEXAMETHASONE SODIUM PHOSPHATE 4 MG/ML
INJECTION, SOLUTION INTRA-ARTICULAR; INTRALESIONAL; INTRAMUSCULAR; INTRAVENOUS; SOFT TISSUE
Status: DISCONTINUED | OUTPATIENT
Start: 2025-09-03 | End: 2025-09-03

## 2025-09-03 RX ORDER — LIDOCAINE HYDROCHLORIDE 20 MG/ML
INJECTION INTRAVENOUS
Status: DISCONTINUED | OUTPATIENT
Start: 2025-09-03 | End: 2025-09-03

## 2025-09-03 RX ORDER — FENTANYL CITRATE 50 UG/ML
INJECTION, SOLUTION INTRAMUSCULAR; INTRAVENOUS
Status: DISCONTINUED | OUTPATIENT
Start: 2025-09-03 | End: 2025-09-03

## 2025-09-03 RX ORDER — PHENYLEPHRINE HYDROCHLORIDE 10 MG/ML
INJECTION INTRAVENOUS
Status: DISCONTINUED | OUTPATIENT
Start: 2025-09-03 | End: 2025-09-03

## 2025-09-03 RX ORDER — PROPOFOL 10 MG/ML
VIAL (ML) INTRAVENOUS
Status: DISCONTINUED | OUTPATIENT
Start: 2025-09-03 | End: 2025-09-03

## 2025-09-03 RX ORDER — LEVOTHYROXINE SODIUM 75 UG/1
75 TABLET ORAL DAILY
Qty: 90 TABLET | Refills: 0 | Status: SHIPPED | OUTPATIENT
Start: 2025-09-03

## 2025-09-03 RX ORDER — ROCURONIUM BROMIDE 10 MG/ML
INJECTION, SOLUTION INTRAVENOUS
Status: DISCONTINUED | OUTPATIENT
Start: 2025-09-03 | End: 2025-09-03

## 2025-09-03 RX ORDER — ONDANSETRON HYDROCHLORIDE 2 MG/ML
INJECTION, SOLUTION INTRAVENOUS
Status: DISCONTINUED | OUTPATIENT
Start: 2025-09-03 | End: 2025-09-03

## 2025-09-03 RX ORDER — EPHEDRINE SULFATE 50 MG/ML
INJECTION, SOLUTION INTRAVENOUS
Status: DISCONTINUED | OUTPATIENT
Start: 2025-09-03 | End: 2025-09-03

## 2025-09-03 RX ADMIN — EPHEDRINE SULFATE 10 MG: 50 INJECTION INTRAVENOUS at 01:09

## 2025-09-03 RX ADMIN — FENTANYL CITRATE 25 MCG: 50 INJECTION, SOLUTION INTRAMUSCULAR; INTRAVENOUS at 01:09

## 2025-09-03 RX ADMIN — SUGAMMADEX 100 MG: 100 INJECTION, SOLUTION INTRAVENOUS at 02:09

## 2025-09-03 RX ADMIN — PROPOFOL 50 MG: 10 INJECTION, EMULSION INTRAVENOUS at 01:09

## 2025-09-03 RX ADMIN — FENTANYL CITRATE 50 MCG: 50 INJECTION, SOLUTION INTRAMUSCULAR; INTRAVENOUS at 02:09

## 2025-09-03 RX ADMIN — PHENYLEPHRINE HYDROCHLORIDE 200 MCG: 10 INJECTION INTRAVENOUS at 01:09

## 2025-09-03 RX ADMIN — SODIUM CHLORIDE: 0.9 INJECTION, SOLUTION INTRAVENOUS at 02:09

## 2025-09-03 RX ADMIN — FENTANYL CITRATE 50 MCG: 50 INJECTION, SOLUTION INTRAMUSCULAR; INTRAVENOUS at 01:09

## 2025-09-03 RX ADMIN — LIDOCAINE HYDROCHLORIDE 100 MG: 20 INJECTION, SOLUTION INTRAVENOUS at 01:09

## 2025-09-03 RX ADMIN — ONDANSETRON 4 MG: 2 INJECTION, SOLUTION INTRAMUSCULAR; INTRAVENOUS at 01:09

## 2025-09-03 RX ADMIN — PHENYLEPHRINE HYDROCHLORIDE 100 MCG: 10 INJECTION INTRAVENOUS at 02:09

## 2025-09-03 RX ADMIN — FENTANYL CITRATE 25 MCG: 50 INJECTION, SOLUTION INTRAMUSCULAR; INTRAVENOUS at 12:09

## 2025-09-03 RX ADMIN — TRANEXAMIC ACID 1000 MG: 100 INJECTION, SOLUTION INTRAVENOUS at 02:09

## 2025-09-03 RX ADMIN — CEFAZOLIN SODIUM 2 G: 1 POWDER, FOR SOLUTION INTRAMUSCULAR; INTRAVENOUS at 01:09

## 2025-09-03 RX ADMIN — DEXAMETHASONE SODIUM PHOSPHATE 8 MG: 4 INJECTION, SOLUTION INTRAMUSCULAR; INTRAVENOUS at 01:09

## 2025-09-03 RX ADMIN — TRANEXAMIC ACID 1000 MG: 100 INJECTION, SOLUTION INTRAVENOUS at 01:09

## 2025-09-03 RX ADMIN — SODIUM CHLORIDE: 0.9 INJECTION, SOLUTION INTRAVENOUS at 12:09

## 2025-09-03 RX ADMIN — ETOMIDATE 8 MG: 2 INJECTION, SOLUTION INTRAVENOUS at 01:09

## 2025-09-03 RX ADMIN — ROCURONIUM BROMIDE 50 MG: 10 INJECTION INTRAVENOUS at 01:09

## (undated) DEVICE — INFLATOR ENCORE

## (undated) DEVICE — STOCKINET 4INX48

## (undated) DEVICE — CONTRAST FLEXCIL INJECTION

## (undated) DEVICE — GOWN SURG 2XL DISP TIE BACK

## (undated) DEVICE — BLADE SURG CARBON STEEL SZ11

## (undated) DEVICE — SUT MONOCRYL 3-0 SH U/D

## (undated) DEVICE — DRAPE C-ARM ELAS CLIP 42X120IN

## (undated) DEVICE — SEE MEDLINE ITEM 157173

## (undated) DEVICE — DRESSING TELFA STRL 4X3 LF

## (undated) DEVICE — INFLATOR ENCORE 26 BLLN INFL

## (undated) DEVICE — SUT PROLENE 5-0 36IN C-1

## (undated) DEVICE — CLIP MED TICALL

## (undated) DEVICE — Device

## (undated) DEVICE — GEL AQUASONIC 100 STERILE20GM

## (undated) DEVICE — DRAPE PED LAP SURG 108X77IN

## (undated) DEVICE — INTRODUCER 5FR 70CM

## (undated) DEVICE — SUT V-LOC 180 V-20 3-0 12IN

## (undated) DEVICE — TRAY MINOR GEN SURG

## (undated) DEVICE — SOL 9P NACL IRR PIC IL

## (undated) DEVICE — SYR ONLY LUER LOCK 20CC

## (undated) DEVICE — SOL NS 1000CC

## (undated) DEVICE — SUT VICRYL 3-0 27 SH

## (undated) DEVICE — SUT MONOCRYL 4.0 PS2 CP496G

## (undated) DEVICE — BOWL STERILE LARGE 32OZ

## (undated) DEVICE — CATH IV JELCO 22GAX1

## (undated) DEVICE — SEE MEDLINE ITEM 153688

## (undated) DEVICE — PACK PACER PERMANENT

## (undated) DEVICE — SUT VICRYL CTD 2-0 GI 27 SH

## (undated) DEVICE — SUT V-LOC 90 ABSRB UD 4-0 12IN

## (undated) DEVICE — SUT SILK 2-0 SH 18IN BLACK

## (undated) DEVICE — DRAPE INCISE IOBAN 2 23X17IN

## (undated) DEVICE — PAD DEFIB CADENCE ADULT R2

## (undated) DEVICE — SUT 2-0 VICRYL / SH (J417)

## (undated) DEVICE — SUT 3-0 12-18IN SILK

## (undated) DEVICE — SUT ETHILON 3-0 PS2 18 BLK

## (undated) DEVICE — SUT MCRYL PLUS 4-0 PS2 27IN

## (undated) DEVICE — ELECTRODE REM PLYHSV RETURN 9

## (undated) DEVICE — GAUZE WOVEN STRL 12-PLY 4X4IN

## (undated) DEVICE — GOWN SMART IMP BREATHABLE XXLG

## (undated) DEVICE — SUT PROLENE 6-0 24 C-1 C-1

## (undated) DEVICE — DRAPE PLASTIC U 60X72

## (undated) DEVICE — DRESSING TRANS 4X4 TEGADERM

## (undated) DEVICE — COVER LIGHT HANDLE 80/CA

## (undated) DEVICE — PACK AV VASCULAR ACCESS OMC

## (undated) DEVICE — DEVICE PICC SECURE SORBA VIEW

## (undated) DEVICE — LOOP VESSEL BLUE MAXI

## (undated) DEVICE — KIT INTRO MICRO NIT VSI 4FR

## (undated) DEVICE — SUT 7/0 24IN PROLENE BL MO

## (undated) DEVICE — SEE MEDLINE ITEM 157110

## (undated) DEVICE — DRESSING ANTIMICROBIAL 1 INCH

## (undated) DEVICE — SUT LIGACLIP SMALL XTRA

## (undated) DEVICE — BOOT SUTURE AID

## (undated) DEVICE — TRAY CATH LAB OMC

## (undated) DEVICE — NDL HYPO REG 25G X 1 1/2

## (undated) DEVICE — SYS LABEL CORRECT MED

## (undated) DEVICE — COVER OVERHEAD SURG LT BLUE

## (undated) DEVICE — CATH BLLN DORADO 8-4

## (undated) DEVICE — SET DECANTER MEDICHOICE

## (undated) DEVICE — SPONGE GAUZE 16PLY 4X4

## (undated) DEVICE — SOL NACL 0.9% INJ PF/50151

## (undated) DEVICE — SUT PROLENE 6-0 24 BV-1

## (undated) DEVICE — APPLICATOR CHLORAPREP ORN 26ML

## (undated) DEVICE — HEMOSTAT SURGICEL 4X8IN

## (undated) DEVICE — SUT SILK 3-0 SH 18IN BLACK

## (undated) DEVICE — SET MICROPUNCTURE

## (undated) DEVICE — DRESSING TRANS 2X2 TEGADERM

## (undated) DEVICE — KIT MICROINTRO 4F .018X40X7CM

## (undated) DEVICE — SUT 2-0 12-18IN SILK

## (undated) DEVICE — SUT VICRYL PLUS 3-0 SH 18IN

## (undated) DEVICE — GOWN SURGICAL X-LARGE

## (undated) DEVICE — BANDAGE MATRIX HK LOOP 4IN 5YD

## (undated) DEVICE — DECANTER FLUID TRNSF WHITE 9IN

## (undated) DEVICE — SUT COATED VICRYL 4/0 27IN

## (undated) DEVICE — OMNIPAQUE 350 200ML

## (undated) DEVICE — STOPCOCK 3-WAY

## (undated) DEVICE — SOL IRR SOD CHL .9% POUR

## (undated) DEVICE — ADHESIVE DERMABOND MINI HV

## (undated) DEVICE — PAD ABD 8X10 STERILE

## (undated) DEVICE — VISE RADIFOCUS MULTI TORQUE

## (undated) DEVICE — LOOP VESSEL BLUE MINI 2/CARD

## (undated) DEVICE — DEV-O-LOOPS MINI BLUE

## (undated) DEVICE — TOWEL OR DISP STRL BLUE 4/PK

## (undated) DEVICE — SYR 10CC LUER LOCK

## (undated) DEVICE — DRAPE STERI INSTRUMENT 1018

## (undated) DEVICE — SUT VICRYL+ 2-0 SH 18IN

## (undated) DEVICE — GUIDEWIRE STF .035X180CM ANG

## (undated) DEVICE — SUT 4-0 12-18IN SILK BLACK

## (undated) DEVICE — INTRODUCER VASC RADPQ 5FRX10CM

## (undated) DEVICE — DEVICE CLOSURE MYNX GRIP 5FR

## (undated) DEVICE — BLANKET LOWER BODY 55.9X40.2IN

## (undated) DEVICE — SOL NACL 0.9% IV INJ 1000ML

## (undated) DEVICE — TIP YANKAUERS BULB NO VENT

## (undated) DEVICE — SHEATH PINNACLE 6FRX6CM

## (undated) DEVICE — CONTAINER SPECIMEN OR STER 4OZ

## (undated) DEVICE — ADHESIVE DERMABOND ADVANCED

## (undated) DEVICE — SWAB COLL CULTURESWAB MINI-TIP

## (undated) DEVICE — SPONGE DERMACEA GAUZE 4X4

## (undated) DEVICE — COLLECTOR SPECIMEN ANAEROBIC

## (undated) DEVICE — SYS EMBLEM S-ICD ELECTRODE DEL

## (undated) DEVICE — SYR DISP LL 5CC

## (undated) DEVICE — DRESSING AQUACEL FOAM 5 X 5

## (undated) DEVICE — SUT PROLENE 5-0 24 C-1 BL

## (undated) DEVICE — KIT PROBE COVER WITH GEL

## (undated) DEVICE — COVER INSTR ELASTIC BAND 40X20

## (undated) DEVICE — STOPCOCK 3 WAY MED PRESSURE

## (undated) DEVICE — GLOVE SURG BIOGEL LATEX SZ 7.5

## (undated) DEVICE — SPONGE LAP 18X18 PREWASHED

## (undated) DEVICE — TRAY MINOR GEN SURG OMC

## (undated) DEVICE — SUT MONOCRYL 4-0 PS-2

## (undated) DEVICE — SEE MEDLINE ITEM 157148

## (undated) DEVICE — SYS LABLNG CORECT MED 4 FLG

## (undated) DEVICE — SUT 0 VICRYL / UR6 (J603)

## (undated) DEVICE — CONTAINER SPECIMEN STRL 4OZ

## (undated) DEVICE — DRESSING AQUACEL FOAM 3 X 3

## (undated) DEVICE — COVERS PROBE NR-48 STERILE

## (undated) DEVICE — WIRE CHOICE PT X SUPP 014X300

## (undated) DEVICE — SEE MEDLINE ITEM 146292

## (undated) DEVICE — VAC WOUND ULTRA THERAPY

## (undated) DEVICE — CATH INTROCAN SAFETY 20GX1.75

## (undated) DEVICE — DRAPE T THYROID STERILE

## (undated) DEVICE — TOWEL OR XRAY WHITE 17X26IN

## (undated) DEVICE — DRESSING MEPORE 3.6 X 10

## (undated) DEVICE — SPONGE DERMACEA 4X4IN 12PLY

## (undated) DEVICE — SEE MEDLINE ITEM 107746

## (undated) DEVICE — SYR 3CC LUER LOC

## (undated) DEVICE — SUT CV-6 30 IN TTC-09NDL